# Patient Record
Sex: FEMALE | Race: WHITE | Employment: OTHER
[De-identification: names, ages, dates, MRNs, and addresses within clinical notes are randomized per-mention and may not be internally consistent; named-entity substitution may affect disease eponyms.]

---

## 2017-01-05 ENCOUNTER — CARE COORDINATION (OUTPATIENT)
Dept: CARE COORDINATION | Facility: CLINIC | Age: 72
End: 2017-01-05

## 2017-01-15 PROBLEM — J18.9 HCAP (HEALTHCARE-ASSOCIATED PNEUMONIA): Status: ACTIVE | Noted: 2017-01-15

## 2017-01-20 ENCOUNTER — TELEPHONE (OUTPATIENT)
Dept: GASTROENTEROLOGY | Facility: CLINIC | Age: 72
End: 2017-01-20

## 2017-01-25 RX ORDER — ATORVASTATIN CALCIUM 20 MG/1
TABLET, FILM COATED ORAL
Qty: 90 TABLET | Refills: 2 | Status: SHIPPED | OUTPATIENT
Start: 2017-01-25 | End: 2017-10-22 | Stop reason: SDUPTHER

## 2017-02-08 ENCOUNTER — TELEPHONE (OUTPATIENT)
Dept: GASTROENTEROLOGY | Facility: CLINIC | Age: 72
End: 2017-02-08

## 2017-02-09 ENCOUNTER — TELEPHONE (OUTPATIENT)
Dept: FAMILY MEDICINE CLINIC | Facility: CLINIC | Age: 72
End: 2017-02-09

## 2017-02-09 DIAGNOSIS — J18.9 PNEUMONIA DUE TO INFECTIOUS ORGANISM, UNSPECIFIED LATERALITY, UNSPECIFIED PART OF LUNG: Primary | ICD-10-CM

## 2017-02-10 ENCOUNTER — TELEPHONE (OUTPATIENT)
Dept: FAMILY MEDICINE CLINIC | Facility: CLINIC | Age: 72
End: 2017-02-10

## 2017-02-10 DIAGNOSIS — J18.9 PNEUMONIA DUE TO INFECTIOUS ORGANISM, UNSPECIFIED LATERALITY, UNSPECIFIED PART OF LUNG: Primary | ICD-10-CM

## 2017-02-13 ENCOUNTER — OFFICE VISIT (OUTPATIENT)
Dept: FAMILY MEDICINE CLINIC | Facility: CLINIC | Age: 72
End: 2017-02-13

## 2017-02-13 VITALS
OXYGEN SATURATION: 95 % | SYSTOLIC BLOOD PRESSURE: 109 MMHG | BODY MASS INDEX: 30.73 KG/M2 | HEART RATE: 80 BPM | DIASTOLIC BLOOD PRESSURE: 34 MMHG | WEIGHT: 179 LBS

## 2017-02-13 DIAGNOSIS — Z09 HOSPITAL DISCHARGE FOLLOW-UP: Primary | ICD-10-CM

## 2017-02-13 DIAGNOSIS — I50.41 ACUTE COMBINED SYSTOLIC AND DIASTOLIC CONGESTIVE HEART FAILURE (HCC): ICD-10-CM

## 2017-02-13 DIAGNOSIS — N17.9 RENAL FAILURE (ARF), ACUTE ON CHRONIC (HCC): ICD-10-CM

## 2017-02-13 DIAGNOSIS — I95.2 HYPOTENSION DUE TO DRUGS: ICD-10-CM

## 2017-02-13 DIAGNOSIS — N18.9 RENAL FAILURE (ARF), ACUTE ON CHRONIC (HCC): ICD-10-CM

## 2017-02-13 PROCEDURE — 1123F ACP DISCUSS/DSCN MKR DOCD: CPT | Performed by: FAMILY MEDICINE

## 2017-02-13 PROCEDURE — G8417 CALC BMI ABV UP PARAM F/U: HCPCS | Performed by: FAMILY MEDICINE

## 2017-02-13 PROCEDURE — 1111F DSCHRG MED/CURRENT MED MERGE: CPT | Performed by: FAMILY MEDICINE

## 2017-02-13 PROCEDURE — 3014F SCREEN MAMMO DOC REV: CPT | Performed by: FAMILY MEDICINE

## 2017-02-13 PROCEDURE — 99214 OFFICE O/P EST MOD 30 MIN: CPT | Performed by: FAMILY MEDICINE

## 2017-02-13 PROCEDURE — 1090F PRES/ABSN URINE INCON ASSESS: CPT | Performed by: FAMILY MEDICINE

## 2017-02-13 PROCEDURE — 4040F PNEUMOC VAC/ADMIN/RCVD: CPT | Performed by: FAMILY MEDICINE

## 2017-02-13 PROCEDURE — 3017F COLORECTAL CA SCREEN DOC REV: CPT | Performed by: FAMILY MEDICINE

## 2017-02-13 PROCEDURE — 1036F TOBACCO NON-USER: CPT | Performed by: FAMILY MEDICINE

## 2017-02-13 PROCEDURE — G8427 DOCREV CUR MEDS BY ELIG CLIN: HCPCS | Performed by: FAMILY MEDICINE

## 2017-02-13 PROCEDURE — G8484 FLU IMMUNIZE NO ADMIN: HCPCS | Performed by: FAMILY MEDICINE

## 2017-02-13 PROCEDURE — G8400 PT W/DXA NO RESULTS DOC: HCPCS | Performed by: FAMILY MEDICINE

## 2017-02-13 RX ORDER — HYDROCODONE BITARTRATE AND ACETAMINOPHEN 5; 325 MG/1; MG/1
1 TABLET ORAL EVERY 12 HOURS PRN
COMMUNITY
End: 2017-07-18 | Stop reason: SDUPTHER

## 2017-02-13 RX ORDER — POTASSIUM CHLORIDE 20 MEQ/1
1 TABLET, EXTENDED RELEASE ORAL DAILY
Status: ON HOLD | COMMUNITY
Start: 2017-02-07 | End: 2017-12-16 | Stop reason: SDUPTHER

## 2017-02-13 RX ORDER — FUROSEMIDE 20 MG/1
10 TABLET ORAL DAILY
COMMUNITY
Start: 2017-02-07 | End: 2017-03-20 | Stop reason: SDUPTHER

## 2017-02-14 ENCOUNTER — TELEPHONE (OUTPATIENT)
Dept: FAMILY MEDICINE CLINIC | Facility: CLINIC | Age: 72
End: 2017-02-14

## 2017-02-20 ENCOUNTER — CARE COORDINATION (OUTPATIENT)
Dept: CARE COORDINATION | Facility: CLINIC | Age: 72
End: 2017-02-20

## 2017-02-22 DIAGNOSIS — E78.2 MIXED HYPERLIPIDEMIA: ICD-10-CM

## 2017-02-22 DIAGNOSIS — K21.9 GASTROESOPHAGEAL REFLUX DISEASE WITHOUT ESOPHAGITIS: ICD-10-CM

## 2017-02-22 DIAGNOSIS — E11.9 TYPE 2 DIABETES MELLITUS WITHOUT COMPLICATION, WITHOUT LONG-TERM CURRENT USE OF INSULIN (HCC): ICD-10-CM

## 2017-02-22 DIAGNOSIS — G20 PARKINSON DISEASE (HCC): ICD-10-CM

## 2017-02-22 DIAGNOSIS — I10 ESSENTIAL HYPERTENSION: ICD-10-CM

## 2017-02-22 RX ORDER — CELECOXIB 200 MG/1
CAPSULE ORAL
Qty: 90 CAPSULE | Refills: 0 | Status: ON HOLD | OUTPATIENT
Start: 2017-02-22 | End: 2017-04-25 | Stop reason: SDUPTHER

## 2017-03-02 ENCOUNTER — TELEPHONE (OUTPATIENT)
Dept: FAMILY MEDICINE CLINIC | Facility: CLINIC | Age: 72
End: 2017-03-02

## 2017-03-03 ENCOUNTER — TELEPHONE (OUTPATIENT)
Dept: FAMILY MEDICINE CLINIC | Facility: CLINIC | Age: 72
End: 2017-03-03

## 2017-03-08 ENCOUNTER — HOSPITAL ENCOUNTER (OUTPATIENT)
Age: 72
Setting detail: SPECIMEN
Discharge: HOME | End: 2017-03-08
Payer: COMMERCIAL

## 2017-03-08 LAB
ALBUMIN SERPL-MCNC: 4.1 G/DL (ref 3.5–5.2)
ANION GAP SERPL CALCULATED.3IONS-SCNC: 15 MMOL/L (ref 9–17)
ANION GAP SERPL CALCULATED.3IONS-SCNC: 15 MMOL/L (ref 9–17)
BUN BLDV-MCNC: 24 MG/DL (ref 8–23)
BUN BLDV-MCNC: 24 MG/DL (ref 8–23)
BUN/CREAT BLD: ABNORMAL (ref 9–20)
BUN/CREAT BLD: ABNORMAL (ref 9–20)
CALCIUM SERPL-MCNC: 8.5 MG/DL (ref 8.6–10.4)
CALCIUM SERPL-MCNC: 8.5 MG/DL (ref 8.6–10.4)
CHLORIDE BLD-SCNC: 97 MMOL/L (ref 98–107)
CHLORIDE BLD-SCNC: 97 MMOL/L (ref 98–107)
CO2: 32 MMOL/L (ref 20–31)
CO2: 32 MMOL/L (ref 20–31)
CREAT SERPL-MCNC: 1.44 MG/DL (ref 0.5–0.9)
CREAT SERPL-MCNC: 1.44 MG/DL (ref 0.5–0.9)
GFR AFRICAN AMERICAN: 44 ML/MIN
GFR AFRICAN AMERICAN: 44 ML/MIN
GFR NON-AFRICAN AMERICAN: 36 ML/MIN
GFR NON-AFRICAN AMERICAN: 36 ML/MIN
GFR SERPL CREATININE-BSD FRML MDRD: ABNORMAL ML/MIN/{1.73_M2}
GLUCOSE BLD-MCNC: 104 MG/DL (ref 70–99)
GLUCOSE BLD-MCNC: 104 MG/DL (ref 70–99)
MAGNESIUM: 1.4 MG/DL (ref 1.6–2.6)
PHOSPHORUS: 5 MG/DL (ref 2.6–4.5)
POTASSIUM SERPL-SCNC: 5.1 MMOL/L (ref 3.7–5.3)
POTASSIUM SERPL-SCNC: 5.1 MMOL/L (ref 3.7–5.3)
SODIUM BLD-SCNC: 144 MMOL/L (ref 135–144)
SODIUM BLD-SCNC: 144 MMOL/L (ref 135–144)

## 2017-03-10 ENCOUNTER — TELEPHONE (OUTPATIENT)
Dept: FAMILY MEDICINE CLINIC | Facility: CLINIC | Age: 72
End: 2017-03-10

## 2017-03-20 RX ORDER — FUROSEMIDE 20 MG/1
20 TABLET ORAL DAILY
Qty: 90 TABLET | Refills: 3 | Status: SHIPPED | OUTPATIENT
Start: 2017-03-20 | End: 2017-11-09 | Stop reason: SDUPTHER

## 2017-03-20 RX ORDER — PANTOPRAZOLE SODIUM 40 MG/1
40 TABLET, DELAYED RELEASE ORAL
Qty: 180 TABLET | Refills: 3 | Status: SHIPPED | OUTPATIENT
Start: 2017-03-20 | End: 2017-11-09 | Stop reason: SDUPTHER

## 2017-03-21 ENCOUNTER — OFFICE VISIT (OUTPATIENT)
Dept: GASTROENTEROLOGY | Age: 72
End: 2017-03-21
Payer: COMMERCIAL

## 2017-03-21 VITALS
TEMPERATURE: 97.3 F | RESPIRATION RATE: 14 BRPM | OXYGEN SATURATION: 95 % | SYSTOLIC BLOOD PRESSURE: 141 MMHG | HEART RATE: 76 BPM | WEIGHT: 176 LBS | BODY MASS INDEX: 37.97 KG/M2 | HEIGHT: 57 IN | DIASTOLIC BLOOD PRESSURE: 56 MMHG

## 2017-03-21 DIAGNOSIS — D50.0 IRON DEFICIENCY ANEMIA DUE TO CHRONIC BLOOD LOSS: Primary | ICD-10-CM

## 2017-03-21 DIAGNOSIS — K92.2 GASTROINTESTINAL HEMORRHAGE, UNSPECIFIED GASTROINTESTINAL HEMORRHAGE TYPE: ICD-10-CM

## 2017-03-21 DIAGNOSIS — K63.5 COLON POLYPS: ICD-10-CM

## 2017-03-21 DIAGNOSIS — K21.9 GASTROESOPHAGEAL REFLUX DISEASE WITHOUT ESOPHAGITIS: ICD-10-CM

## 2017-03-21 PROCEDURE — G8417 CALC BMI ABV UP PARAM F/U: HCPCS | Performed by: INTERNAL MEDICINE

## 2017-03-21 PROCEDURE — G8484 FLU IMMUNIZE NO ADMIN: HCPCS | Performed by: INTERNAL MEDICINE

## 2017-03-21 PROCEDURE — 3017F COLORECTAL CA SCREEN DOC REV: CPT | Performed by: INTERNAL MEDICINE

## 2017-03-21 PROCEDURE — 1123F ACP DISCUSS/DSCN MKR DOCD: CPT | Performed by: INTERNAL MEDICINE

## 2017-03-21 PROCEDURE — 99214 OFFICE O/P EST MOD 30 MIN: CPT | Performed by: INTERNAL MEDICINE

## 2017-03-21 PROCEDURE — 1036F TOBACCO NON-USER: CPT | Performed by: INTERNAL MEDICINE

## 2017-03-21 PROCEDURE — 1090F PRES/ABSN URINE INCON ASSESS: CPT | Performed by: INTERNAL MEDICINE

## 2017-03-21 PROCEDURE — 4040F PNEUMOC VAC/ADMIN/RCVD: CPT | Performed by: INTERNAL MEDICINE

## 2017-03-21 PROCEDURE — G8400 PT W/DXA NO RESULTS DOC: HCPCS | Performed by: INTERNAL MEDICINE

## 2017-03-21 PROCEDURE — G8428 CUR MEDS NOT DOCUMENT: HCPCS | Performed by: INTERNAL MEDICINE

## 2017-03-21 PROCEDURE — 3014F SCREEN MAMMO DOC REV: CPT | Performed by: INTERNAL MEDICINE

## 2017-03-21 PROCEDURE — 1111F DSCHRG MED/CURRENT MED MERGE: CPT | Performed by: INTERNAL MEDICINE

## 2017-03-21 RX ORDER — MAGNESIUM OXIDE 400 MG/1
400 TABLET ORAL DAILY
Status: ON HOLD | COMMUNITY
End: 2018-03-10 | Stop reason: HOSPADM

## 2017-03-21 ASSESSMENT — ENCOUNTER SYMPTOMS
WHEEZING: 1
COUGH: 1
ABDOMINAL DISTENTION: 0
DIARRHEA: 1
CONSTIPATION: 1
VOMITING: 0
BLOOD IN STOOL: 0
SHORTNESS OF BREATH: 1
VOICE CHANGE: 1
RECTAL PAIN: 0
ANAL BLEEDING: 0
ALLERGIC/IMMUNOLOGIC NEGATIVE: 1
TROUBLE SWALLOWING: 1
NAUSEA: 1
SINUS PRESSURE: 1
ABDOMINAL PAIN: 0

## 2017-04-17 ENCOUNTER — TELEPHONE (OUTPATIENT)
Dept: GASTROENTEROLOGY | Age: 72
End: 2017-04-17

## 2017-04-25 ENCOUNTER — HOSPITAL ENCOUNTER (OUTPATIENT)
Age: 72
Setting detail: OUTPATIENT SURGERY
Discharge: HOME | End: 2017-04-25
Attending: INTERNAL MEDICINE | Admitting: INTERNAL MEDICINE
Payer: COMMERCIAL

## 2017-04-25 VITALS
TEMPERATURE: 97.2 F | OXYGEN SATURATION: 100 % | BODY MASS INDEX: 38.1 KG/M2 | SYSTOLIC BLOOD PRESSURE: 153 MMHG | HEART RATE: 65 BPM | WEIGHT: 176.6 LBS | RESPIRATION RATE: 16 BRPM | DIASTOLIC BLOOD PRESSURE: 58 MMHG | HEIGHT: 57 IN

## 2017-04-25 PROCEDURE — 7100000011 HC PHASE II RECOVERY - ADDTL 15 MIN: Performed by: INTERNAL MEDICINE

## 2017-04-25 PROCEDURE — 99153 MOD SED SAME PHYS/QHP EA: CPT | Performed by: INTERNAL MEDICINE

## 2017-04-25 PROCEDURE — 88305 TISSUE EXAM BY PATHOLOGIST: CPT

## 2017-04-25 PROCEDURE — 6360000002 HC RX W HCPCS: Performed by: INTERNAL MEDICINE

## 2017-04-25 PROCEDURE — 3609010400 HC COLONOSCOPY POLYPECTOMY HOT BIOPSY: Performed by: INTERNAL MEDICINE

## 2017-04-25 PROCEDURE — 2580000003 HC RX 258: Performed by: INTERNAL MEDICINE

## 2017-04-25 PROCEDURE — 7100000010 HC PHASE II RECOVERY - FIRST 15 MIN: Performed by: INTERNAL MEDICINE

## 2017-04-25 PROCEDURE — 99152 MOD SED SAME PHYS/QHP 5/>YRS: CPT | Performed by: INTERNAL MEDICINE

## 2017-04-25 RX ORDER — MEPERIDINE HYDROCHLORIDE 50 MG/ML
INJECTION INTRAMUSCULAR; INTRAVENOUS; SUBCUTANEOUS PRN
Status: DISCONTINUED | OUTPATIENT
Start: 2017-04-25 | End: 2017-04-25 | Stop reason: HOSPADM

## 2017-04-25 RX ORDER — MIDAZOLAM HYDROCHLORIDE 1 MG/ML
INJECTION INTRAMUSCULAR; INTRAVENOUS PRN
Status: DISCONTINUED | OUTPATIENT
Start: 2017-04-25 | End: 2017-04-25 | Stop reason: HOSPADM

## 2017-04-25 RX ORDER — SODIUM CHLORIDE, SODIUM LACTATE, POTASSIUM CHLORIDE, CALCIUM CHLORIDE 600; 310; 30; 20 MG/100ML; MG/100ML; MG/100ML; MG/100ML
INJECTION, SOLUTION INTRAVENOUS ONCE
Status: COMPLETED | OUTPATIENT
Start: 2017-04-25 | End: 2017-04-25

## 2017-04-25 RX ADMIN — SODIUM CHLORIDE, POTASSIUM CHLORIDE, SODIUM LACTATE AND CALCIUM CHLORIDE: 600; 310; 30; 20 INJECTION, SOLUTION INTRAVENOUS at 08:12

## 2017-04-25 ASSESSMENT — PAIN SCALES - GENERAL
PAINLEVEL_OUTOF10: 0

## 2017-04-25 ASSESSMENT — PAIN - FUNCTIONAL ASSESSMENT: PAIN_FUNCTIONAL_ASSESSMENT: 0-10

## 2017-04-26 LAB — SURGICAL PATHOLOGY REPORT: NORMAL

## 2017-06-23 ENCOUNTER — HOSPITAL ENCOUNTER (OUTPATIENT)
Age: 72
Discharge: HOME | End: 2017-06-23
Payer: COMMERCIAL

## 2017-06-23 ENCOUNTER — HOSPITAL ENCOUNTER (OUTPATIENT)
Dept: CT IMAGING | Age: 72
Discharge: HOME | End: 2017-06-23
Payer: COMMERCIAL

## 2017-06-23 DIAGNOSIS — R91.1 PULMONARY NODULE: ICD-10-CM

## 2017-06-23 LAB
ANION GAP SERPL CALCULATED.3IONS-SCNC: 12 MMOL/L (ref 9–17)
BUN BLDV-MCNC: 23 MG/DL (ref 8–23)
BUN/CREAT BLD: 19 (ref 9–20)
CALCIUM SERPL-MCNC: 9.3 MG/DL (ref 8.6–10.4)
CHLORIDE BLD-SCNC: 97 MMOL/L (ref 98–107)
CO2: 35 MMOL/L (ref 20–31)
CREAT SERPL-MCNC: 1.2 MG/DL (ref 0.5–0.9)
GFR AFRICAN AMERICAN: 54 ML/MIN
GFR NON-AFRICAN AMERICAN: 45 ML/MIN
GFR SERPL CREATININE-BSD FRML MDRD: ABNORMAL ML/MIN/{1.73_M2}
GFR SERPL CREATININE-BSD FRML MDRD: ABNORMAL ML/MIN/{1.73_M2}
GLUCOSE FASTING: 91 MG/DL (ref 70–99)
MAGNESIUM: 1.5 MG/DL (ref 1.6–2.6)
POTASSIUM SERPL-SCNC: 4.7 MMOL/L (ref 3.7–5.3)
SODIUM BLD-SCNC: 144 MMOL/L (ref 135–144)

## 2017-06-23 PROCEDURE — 80048 BASIC METABOLIC PNL TOTAL CA: CPT

## 2017-06-23 PROCEDURE — 71250 CT THORAX DX C-: CPT

## 2017-06-23 PROCEDURE — 83735 ASSAY OF MAGNESIUM: CPT

## 2017-06-23 PROCEDURE — 36415 COLL VENOUS BLD VENIPUNCTURE: CPT

## 2017-06-25 ENCOUNTER — PATIENT MESSAGE (OUTPATIENT)
Dept: FAMILY MEDICINE CLINIC | Age: 72
End: 2017-06-25

## 2017-07-18 ENCOUNTER — OFFICE VISIT (OUTPATIENT)
Dept: FAMILY MEDICINE CLINIC | Age: 72
End: 2017-07-18
Payer: COMMERCIAL

## 2017-07-18 VITALS
WEIGHT: 183 LBS | RESPIRATION RATE: 16 BRPM | OXYGEN SATURATION: 95 % | SYSTOLIC BLOOD PRESSURE: 138 MMHG | BODY MASS INDEX: 39.48 KG/M2 | HEART RATE: 88 BPM | DIASTOLIC BLOOD PRESSURE: 64 MMHG | HEIGHT: 57 IN

## 2017-07-18 DIAGNOSIS — H91.93 HEARING LOSS, BILATERAL: ICD-10-CM

## 2017-07-18 DIAGNOSIS — K21.9 GASTROESOPHAGEAL REFLUX DISEASE WITHOUT ESOPHAGITIS: ICD-10-CM

## 2017-07-18 DIAGNOSIS — G20 PARKINSON DISEASE (HCC): ICD-10-CM

## 2017-07-18 DIAGNOSIS — E11.9 TYPE 2 DIABETES MELLITUS WITHOUT COMPLICATION, WITHOUT LONG-TERM CURRENT USE OF INSULIN (HCC): ICD-10-CM

## 2017-07-18 DIAGNOSIS — E83.42 HYPOMAGNESEMIA: ICD-10-CM

## 2017-07-18 DIAGNOSIS — E83.51 HYPOCALCEMIA: ICD-10-CM

## 2017-07-18 DIAGNOSIS — E83.39 HYPERPHOSPHATURIA: ICD-10-CM

## 2017-07-18 DIAGNOSIS — I10 ESSENTIAL HYPERTENSION: Primary | ICD-10-CM

## 2017-07-18 DIAGNOSIS — G89.4 CHRONIC PAIN SYNDROME: ICD-10-CM

## 2017-07-18 DIAGNOSIS — I48.20 CHRONIC ATRIAL FIBRILLATION (HCC): ICD-10-CM

## 2017-07-18 DIAGNOSIS — E78.2 MIXED HYPERLIPIDEMIA: ICD-10-CM

## 2017-07-18 DIAGNOSIS — Z12.31 SCREENING MAMMOGRAM, ENCOUNTER FOR: ICD-10-CM

## 2017-07-18 DIAGNOSIS — D50.0 IRON DEFICIENCY ANEMIA DUE TO CHRONIC BLOOD LOSS: ICD-10-CM

## 2017-07-18 DIAGNOSIS — Z23 NEED FOR SHINGLES VACCINE: ICD-10-CM

## 2017-07-18 DIAGNOSIS — J44.1 COPD WITH EXACERBATION (HCC): ICD-10-CM

## 2017-07-18 LAB — HBA1C MFR BLD: 6.2 %

## 2017-07-18 PROCEDURE — 83036 HEMOGLOBIN GLYCOSYLATED A1C: CPT | Performed by: FAMILY MEDICINE

## 2017-07-18 PROCEDURE — G8417 CALC BMI ABV UP PARAM F/U: HCPCS | Performed by: FAMILY MEDICINE

## 2017-07-18 PROCEDURE — 3046F HEMOGLOBIN A1C LEVEL >9.0%: CPT | Performed by: FAMILY MEDICINE

## 2017-07-18 PROCEDURE — 3014F SCREEN MAMMO DOC REV: CPT | Performed by: FAMILY MEDICINE

## 2017-07-18 PROCEDURE — 1123F ACP DISCUSS/DSCN MKR DOCD: CPT | Performed by: FAMILY MEDICINE

## 2017-07-18 PROCEDURE — 90736 HZV VACCINE LIVE SUBQ: CPT | Performed by: FAMILY MEDICINE

## 2017-07-18 PROCEDURE — G8926 SPIRO NO PERF OR DOC: HCPCS | Performed by: FAMILY MEDICINE

## 2017-07-18 PROCEDURE — G8400 PT W/DXA NO RESULTS DOC: HCPCS | Performed by: FAMILY MEDICINE

## 2017-07-18 PROCEDURE — G8427 DOCREV CUR MEDS BY ELIG CLIN: HCPCS | Performed by: FAMILY MEDICINE

## 2017-07-18 PROCEDURE — 4040F PNEUMOC VAC/ADMIN/RCVD: CPT | Performed by: FAMILY MEDICINE

## 2017-07-18 PROCEDURE — 1090F PRES/ABSN URINE INCON ASSESS: CPT | Performed by: FAMILY MEDICINE

## 2017-07-18 PROCEDURE — 99214 OFFICE O/P EST MOD 30 MIN: CPT | Performed by: FAMILY MEDICINE

## 2017-07-18 PROCEDURE — 3017F COLORECTAL CA SCREEN DOC REV: CPT | Performed by: FAMILY MEDICINE

## 2017-07-18 PROCEDURE — 1036F TOBACCO NON-USER: CPT | Performed by: FAMILY MEDICINE

## 2017-07-18 PROCEDURE — 3023F SPIROM DOC REV: CPT | Performed by: FAMILY MEDICINE

## 2017-07-18 PROCEDURE — 90471 IMMUNIZATION ADMIN: CPT | Performed by: FAMILY MEDICINE

## 2017-07-18 RX ORDER — HYDROCODONE BITARTRATE AND ACETAMINOPHEN 5; 325 MG/1; MG/1
1 TABLET ORAL 2 TIMES DAILY PRN
Qty: 60 TABLET | Refills: 0 | Status: SHIPPED | OUTPATIENT
Start: 2017-07-18 | End: 2017-08-16 | Stop reason: SDUPTHER

## 2017-07-18 RX ORDER — ALBUTEROL SULFATE 2.5 MG/3ML
2.5 SOLUTION RESPIRATORY (INHALATION) EVERY 6 HOURS PRN
Qty: 360 EACH | Refills: 3 | Status: ON HOLD | OUTPATIENT
Start: 2017-07-18 | End: 2018-09-07 | Stop reason: HOSPADM

## 2017-07-18 ASSESSMENT — PATIENT HEALTH QUESTIONNAIRE - PHQ9
2. FEELING DOWN, DEPRESSED OR HOPELESS: 0
1. LITTLE INTEREST OR PLEASURE IN DOING THINGS: 0
SUM OF ALL RESPONSES TO PHQ9 QUESTIONS 1 & 2: 0
SUM OF ALL RESPONSES TO PHQ QUESTIONS 1-9: 0

## 2017-08-16 DIAGNOSIS — G89.4 CHRONIC PAIN SYNDROME: ICD-10-CM

## 2017-08-17 ENCOUNTER — HOSPITAL ENCOUNTER (INPATIENT)
Age: 72
LOS: 3 days | Discharge: HOME | DRG: 308 | End: 2017-08-20
Attending: EMERGENCY MEDICINE | Admitting: INTERNAL MEDICINE
Payer: COMMERCIAL

## 2017-08-17 ENCOUNTER — APPOINTMENT (OUTPATIENT)
Dept: GENERAL RADIOLOGY | Age: 72
DRG: 308 | End: 2017-08-17
Payer: COMMERCIAL

## 2017-08-17 DIAGNOSIS — I48.91 ATRIAL FIBRILLATION WITH RVR (HCC): Primary | ICD-10-CM

## 2017-08-17 LAB
ABSOLUTE EOS #: 0.2 K/UL (ref 0–0.4)
ABSOLUTE LYMPH #: 1.4 K/UL (ref 1–4.8)
ABSOLUTE MONO #: 0.6 K/UL (ref 0.2–0.8)
ANION GAP SERPL CALCULATED.3IONS-SCNC: 13 MMOL/L (ref 9–17)
BASOPHILS # BLD: 0 %
BASOPHILS ABSOLUTE: 0 K/UL (ref 0–0.2)
BNP INTERPRETATION: ABNORMAL
BUN BLDV-MCNC: 21 MG/DL (ref 8–23)
BUN/CREAT BLD: 18 (ref 9–20)
CALCIUM SERPL-MCNC: 8.9 MG/DL (ref 8.6–10.4)
CHLORIDE BLD-SCNC: 97 MMOL/L (ref 98–107)
CO2: 33 MMOL/L (ref 20–31)
CREAT SERPL-MCNC: 1.16 MG/DL (ref 0.5–0.9)
DIFFERENTIAL TYPE: ABNORMAL
EOSINOPHILS RELATIVE PERCENT: 2 %
GFR AFRICAN AMERICAN: 56 ML/MIN
GFR NON-AFRICAN AMERICAN: 46 ML/MIN
GFR SERPL CREATININE-BSD FRML MDRD: ABNORMAL ML/MIN/{1.73_M2}
GFR SERPL CREATININE-BSD FRML MDRD: ABNORMAL ML/MIN/{1.73_M2}
GLUCOSE BLD-MCNC: 185 MG/DL (ref 70–99)
HCT VFR BLD CALC: 29.2 % (ref 36–46)
HEMOGLOBIN: 9.5 G/DL (ref 12–16)
LYMPHOCYTES # BLD: 13 %
MAGNESIUM: 1.5 MG/DL (ref 1.6–2.6)
MCH RBC QN AUTO: 30.3 PG (ref 26–34)
MCHC RBC AUTO-ENTMCNC: 32.7 G/DL (ref 31–37)
MCV RBC AUTO: 92.6 FL (ref 80–100)
MONOCYTES # BLD: 6 %
MYOGLOBIN: 230 NG/ML (ref 25–58)
MYOGLOBIN: 59 NG/ML (ref 25–58)
MYOGLOBIN: 96 NG/ML (ref 25–58)
PDW BLD-RTO: 15.8 % (ref 11.5–14.5)
PLATELET # BLD: 128 K/UL (ref 130–400)
PLATELET ESTIMATE: ABNORMAL
PMV BLD AUTO: 8.4 FL (ref 6–12)
POTASSIUM SERPL-SCNC: 4.2 MMOL/L (ref 3.7–5.3)
PRO-BNP: 498 PG/ML
RBC # BLD: 3.15 M/UL (ref 4–5.2)
RBC # BLD: ABNORMAL 10*6/UL
SEG NEUTROPHILS: 79 %
SEGMENTED NEUTROPHILS ABSOLUTE COUNT: 8 K/UL (ref 1.8–7.7)
SODIUM BLD-SCNC: 143 MMOL/L (ref 135–144)
THYROXINE, FREE: 1.41 NG/DL (ref 0.93–1.7)
TROPONIN INTERP: ABNORMAL
TROPONIN T: <0.03 NG/ML
TSH SERPL DL<=0.05 MIU/L-ACNC: 5.28 MIU/L (ref 0.3–5)
WBC # BLD: 10.2 K/UL (ref 3.5–11)
WBC # BLD: ABNORMAL 10*3/UL

## 2017-08-17 PROCEDURE — 94640 AIRWAY INHALATION TREATMENT: CPT

## 2017-08-17 PROCEDURE — 36415 COLL VENOUS BLD VENIPUNCTURE: CPT

## 2017-08-17 PROCEDURE — 2500000003 HC RX 250 WO HCPCS: Performed by: EMERGENCY MEDICINE

## 2017-08-17 PROCEDURE — 2060000000 HC ICU INTERMEDIATE R&B

## 2017-08-17 PROCEDURE — 96366 THER/PROPH/DIAG IV INF ADDON: CPT

## 2017-08-17 PROCEDURE — 93005 ELECTROCARDIOGRAM TRACING: CPT

## 2017-08-17 PROCEDURE — 2580000003 HC RX 258: Performed by: EMERGENCY MEDICINE

## 2017-08-17 PROCEDURE — 2500000003 HC RX 250 WO HCPCS: Performed by: NURSE PRACTITIONER

## 2017-08-17 PROCEDURE — 80048 BASIC METABOLIC PNL TOTAL CA: CPT

## 2017-08-17 PROCEDURE — 83735 ASSAY OF MAGNESIUM: CPT

## 2017-08-17 PROCEDURE — 6370000000 HC RX 637 (ALT 250 FOR IP): Performed by: INTERNAL MEDICINE

## 2017-08-17 PROCEDURE — 96368 THER/DIAG CONCURRENT INF: CPT

## 2017-08-17 PROCEDURE — 84439 ASSAY OF FREE THYROXINE: CPT

## 2017-08-17 PROCEDURE — 94760 N-INVAS EAR/PLS OXIMETRY 1: CPT

## 2017-08-17 PROCEDURE — 96375 TX/PRO/DX INJ NEW DRUG ADDON: CPT

## 2017-08-17 PROCEDURE — 71010 XR CHEST PORTABLE: CPT

## 2017-08-17 PROCEDURE — 84484 ASSAY OF TROPONIN QUANT: CPT

## 2017-08-17 PROCEDURE — 96376 TX/PRO/DX INJ SAME DRUG ADON: CPT

## 2017-08-17 PROCEDURE — 96365 THER/PROPH/DIAG IV INF INIT: CPT

## 2017-08-17 PROCEDURE — 6360000002 HC RX W HCPCS: Performed by: NURSE PRACTITIONER

## 2017-08-17 PROCEDURE — 99285 EMERGENCY DEPT VISIT HI MDM: CPT

## 2017-08-17 PROCEDURE — 84443 ASSAY THYROID STIM HORMONE: CPT

## 2017-08-17 PROCEDURE — 6370000000 HC RX 637 (ALT 250 FOR IP): Performed by: NURSE PRACTITIONER

## 2017-08-17 PROCEDURE — 85025 COMPLETE CBC W/AUTO DIFF WBC: CPT

## 2017-08-17 PROCEDURE — 83874 ASSAY OF MYOGLOBIN: CPT

## 2017-08-17 PROCEDURE — 83880 ASSAY OF NATRIURETIC PEPTIDE: CPT

## 2017-08-17 PROCEDURE — 2700000000 HC OXYGEN THERAPY PER DAY

## 2017-08-17 RX ORDER — CALCIUM CARBONATE 200(500)MG
1500 TABLET,CHEWABLE ORAL DAILY
Status: DISCONTINUED | OUTPATIENT
Start: 2017-08-17 | End: 2017-08-20 | Stop reason: HOSPADM

## 2017-08-17 RX ORDER — FUROSEMIDE 20 MG/1
20 TABLET ORAL DAILY
Status: DISCONTINUED | OUTPATIENT
Start: 2017-08-17 | End: 2017-08-20 | Stop reason: HOSPADM

## 2017-08-17 RX ORDER — SPIRONOLACTONE 25 MG/1
25 TABLET ORAL DAILY
Status: DISCONTINUED | OUTPATIENT
Start: 2017-08-17 | End: 2017-08-20 | Stop reason: HOSPADM

## 2017-08-17 RX ORDER — DILTIAZEM HYDROCHLORIDE 120 MG/1
120 CAPSULE, COATED, EXTENDED RELEASE ORAL DAILY
Status: DISCONTINUED | OUTPATIENT
Start: 2017-08-17 | End: 2017-08-19

## 2017-08-17 RX ORDER — ACETAMINOPHEN 325 MG/1
650 TABLET ORAL EVERY 4 HOURS PRN
Status: DISCONTINUED | OUTPATIENT
Start: 2017-08-17 | End: 2017-08-20 | Stop reason: HOSPADM

## 2017-08-17 RX ORDER — RASAGILINE 0.5 MG/1
0.5 TABLET ORAL DAILY
Status: DISCONTINUED | OUTPATIENT
Start: 2017-08-17 | End: 2017-08-20 | Stop reason: HOSPADM

## 2017-08-17 RX ORDER — ALBUTEROL SULFATE 2.5 MG/3ML
2.5 SOLUTION RESPIRATORY (INHALATION) EVERY 6 HOURS PRN
Status: DISCONTINUED | OUTPATIENT
Start: 2017-08-17 | End: 2017-08-20 | Stop reason: HOSPADM

## 2017-08-17 RX ORDER — MORPHINE SULFATE 4 MG/ML
4 INJECTION, SOLUTION INTRAMUSCULAR; INTRAVENOUS ONCE
Status: COMPLETED | OUTPATIENT
Start: 2017-08-17 | End: 2017-08-17

## 2017-08-17 RX ORDER — PANTOPRAZOLE SODIUM 40 MG/1
40 TABLET, DELAYED RELEASE ORAL
Status: DISCONTINUED | OUTPATIENT
Start: 2017-08-17 | End: 2017-08-20 | Stop reason: HOSPADM

## 2017-08-17 RX ORDER — ATORVASTATIN CALCIUM 20 MG/1
20 TABLET, FILM COATED ORAL NIGHTLY
Status: DISCONTINUED | OUTPATIENT
Start: 2017-08-17 | End: 2017-08-20 | Stop reason: HOSPADM

## 2017-08-17 RX ORDER — POTASSIUM CHLORIDE 20 MEQ/1
20 TABLET, EXTENDED RELEASE ORAL DAILY
Status: DISCONTINUED | OUTPATIENT
Start: 2017-08-17 | End: 2017-08-20 | Stop reason: HOSPADM

## 2017-08-17 RX ORDER — CELECOXIB 200 MG/1
200 CAPSULE ORAL DAILY
Status: DISCONTINUED | OUTPATIENT
Start: 2017-08-17 | End: 2017-08-18

## 2017-08-17 RX ORDER — DILTIAZEM HYDROCHLORIDE 5 MG/ML
10 INJECTION INTRAVENOUS ONCE
Status: COMPLETED | OUTPATIENT
Start: 2017-08-17 | End: 2017-08-17

## 2017-08-17 RX ORDER — SODIUM CHLORIDE 9 MG/ML
INJECTION, SOLUTION INTRAVENOUS CONTINUOUS
Status: DISCONTINUED | OUTPATIENT
Start: 2017-08-17 | End: 2017-08-17

## 2017-08-17 RX ORDER — SODIUM CHLORIDE 0.9 % (FLUSH) 0.9 %
10 SYRINGE (ML) INJECTION EVERY 12 HOURS SCHEDULED
Status: DISCONTINUED | OUTPATIENT
Start: 2017-08-17 | End: 2017-08-20 | Stop reason: HOSPADM

## 2017-08-17 RX ORDER — SODIUM CHLORIDE 0.9 % (FLUSH) 0.9 %
10 SYRINGE (ML) INJECTION PRN
Status: DISCONTINUED | OUTPATIENT
Start: 2017-08-17 | End: 2017-08-20 | Stop reason: HOSPADM

## 2017-08-17 RX ORDER — HYDROCODONE BITARTRATE AND ACETAMINOPHEN 5; 325 MG/1; MG/1
1 TABLET ORAL 2 TIMES DAILY PRN
Status: DISCONTINUED | OUTPATIENT
Start: 2017-08-17 | End: 2017-08-20 | Stop reason: HOSPADM

## 2017-08-17 RX ORDER — ONDANSETRON 2 MG/ML
4 INJECTION INTRAMUSCULAR; INTRAVENOUS EVERY 6 HOURS PRN
Status: DISCONTINUED | OUTPATIENT
Start: 2017-08-17 | End: 2017-08-20 | Stop reason: HOSPADM

## 2017-08-17 RX ORDER — ASPIRIN 81 MG/1
324 TABLET, CHEWABLE ORAL ONCE
Status: COMPLETED | OUTPATIENT
Start: 2017-08-17 | End: 2017-08-17

## 2017-08-17 RX ORDER — MAGNESIUM SULFATE 1 G/100ML
1 INJECTION INTRAVENOUS ONCE
Status: COMPLETED | OUTPATIENT
Start: 2017-08-17 | End: 2017-08-17

## 2017-08-17 RX ORDER — CALCITRIOL 0.25 UG/1
0.25 CAPSULE, LIQUID FILLED ORAL 3 TIMES DAILY
Status: DISCONTINUED | OUTPATIENT
Start: 2017-08-17 | End: 2017-08-20 | Stop reason: HOSPADM

## 2017-08-17 RX ORDER — HYDROCODONE BITARTRATE AND ACETAMINOPHEN 5; 325 MG/1; MG/1
1 TABLET ORAL 2 TIMES DAILY PRN
Qty: 60 TABLET | Refills: 0 | Status: SHIPPED | OUTPATIENT
Start: 2017-08-17 | End: 2017-10-02 | Stop reason: SDUPTHER

## 2017-08-17 RX ORDER — CLOPIDOGREL BISULFATE 75 MG/1
75 TABLET ORAL DAILY
Status: DISCONTINUED | OUTPATIENT
Start: 2017-08-17 | End: 2017-08-17

## 2017-08-17 RX ADMIN — MAGNESIUM SULFATE HEPTAHYDRATE 1 G: 1 INJECTION, SOLUTION INTRAVENOUS at 10:35

## 2017-08-17 RX ADMIN — PANTOPRAZOLE SODIUM 40 MG: 40 TABLET, DELAYED RELEASE ORAL at 16:56

## 2017-08-17 RX ADMIN — DILTIAZEM HYDROCHLORIDE 10 MG: 5 INJECTION INTRAVENOUS at 09:43

## 2017-08-17 RX ADMIN — ROPINIROLE HYDROCHLORIDE 3 MG: 2 TABLET, FILM COATED ORAL at 16:49

## 2017-08-17 RX ADMIN — ROPINIROLE HYDROCHLORIDE 3 MG: 2 TABLET, FILM COATED ORAL at 23:31

## 2017-08-17 RX ADMIN — ATORVASTATIN CALCIUM 20 MG: 20 TABLET, FILM COATED ORAL at 20:21

## 2017-08-17 RX ADMIN — DILTIAZEM HYDROCHLORIDE 10 MG/HR: 5 INJECTION INTRAVENOUS at 10:17

## 2017-08-17 RX ADMIN — HYDROCODONE BITARTRATE AND ACETAMINOPHEN 1 TABLET: 5; 325 TABLET ORAL at 23:32

## 2017-08-17 RX ADMIN — MORPHINE SULFATE 4 MG: 4 INJECTION, SOLUTION INTRAMUSCULAR; INTRAVENOUS at 10:55

## 2017-08-17 RX ADMIN — ASPIRIN 81 MG 324 MG: 81 TABLET ORAL at 09:41

## 2017-08-17 RX ADMIN — CARBIDOPA AND LEVODOPA 1 TABLET: 25; 100 TABLET ORAL at 20:21

## 2017-08-17 RX ADMIN — CARBIDOPA AND LEVODOPA 1 TABLET: 25; 100 TABLET ORAL at 16:49

## 2017-08-17 RX ADMIN — METOPROLOL TARTRATE 25 MG: 25 TABLET ORAL at 20:21

## 2017-08-17 RX ADMIN — TIOTROPIUM BROMIDE INHALATION SPRAY 2 PUFF: 3.12 SPRAY, METERED RESPIRATORY (INHALATION) at 20:10

## 2017-08-17 RX ADMIN — DILTIAZEM HYDROCHLORIDE 120 MG: 120 CAPSULE, COATED, EXTENDED RELEASE ORAL at 20:21

## 2017-08-17 RX ADMIN — CALCITRIOL 0.25 MCG: 0.25 CAPSULE, LIQUID FILLED ORAL at 20:21

## 2017-08-17 RX ADMIN — MORPHINE SULFATE 4 MG: 4 INJECTION, SOLUTION INTRAMUSCULAR; INTRAVENOUS at 09:39

## 2017-08-17 RX ADMIN — MOMETASONE FUROATE AND FORMOTEROL FUMARATE DIHYDRATE 2 PUFF: 200; 5 AEROSOL RESPIRATORY (INHALATION) at 20:09

## 2017-08-17 RX ADMIN — SODIUM CHLORIDE 1000 ML: 9 INJECTION, SOLUTION INTRAVENOUS at 10:04

## 2017-08-17 RX ADMIN — APIXABAN 5 MG: 5 TABLET, FILM COATED ORAL at 20:21

## 2017-08-17 RX ADMIN — HYDROCODONE BITARTRATE AND ACETAMINOPHEN 1 TABLET: 5; 325 TABLET ORAL at 16:48

## 2017-08-17 RX ADMIN — CALCITRIOL 0.25 MCG: 0.25 CAPSULE, LIQUID FILLED ORAL at 16:49

## 2017-08-17 ASSESSMENT — PAIN DESCRIPTION - PROGRESSION
CLINICAL_PROGRESSION: NOT CHANGED

## 2017-08-17 ASSESSMENT — ENCOUNTER SYMPTOMS
VOMITING: 0
SHORTNESS OF BREATH: 1
ABDOMINAL PAIN: 0
NAUSEA: 0

## 2017-08-17 ASSESSMENT — PAIN DESCRIPTION - DESCRIPTORS
DESCRIPTORS: ACHING;BURNING
DESCRIPTORS: SHARP
DESCRIPTORS: ACHING;SORE

## 2017-08-17 ASSESSMENT — PAIN DESCRIPTION - LOCATION
LOCATION: GENERALIZED
LOCATION: GENERALIZED
LOCATION: CHEST

## 2017-08-17 ASSESSMENT — PAIN SCALES - GENERAL
PAINLEVEL_OUTOF10: 5
PAINLEVEL_OUTOF10: 5
PAINLEVEL_OUTOF10: 7
PAINLEVEL_OUTOF10: 0
PAINLEVEL_OUTOF10: 7
PAINLEVEL_OUTOF10: 8
PAINLEVEL_OUTOF10: 1

## 2017-08-17 ASSESSMENT — PAIN DESCRIPTION - PAIN TYPE
TYPE: CHRONIC PAIN
TYPE: CHRONIC PAIN
TYPE: ACUTE PAIN

## 2017-08-17 ASSESSMENT — PAIN DESCRIPTION - FREQUENCY
FREQUENCY: CONTINUOUS

## 2017-08-17 ASSESSMENT — PAIN DESCRIPTION - ONSET
ONSET: ON-GOING

## 2017-08-17 ASSESSMENT — PAIN DESCRIPTION - ORIENTATION: ORIENTATION: LEFT

## 2017-08-18 ENCOUNTER — APPOINTMENT (OUTPATIENT)
Dept: GENERAL RADIOLOGY | Age: 72
DRG: 308 | End: 2017-08-18
Payer: COMMERCIAL

## 2017-08-18 PROBLEM — I48.91 RAPID ATRIAL FIBRILLATION (HCC): Status: ACTIVE | Noted: 2017-08-18

## 2017-08-18 PROBLEM — J43.1 PANLOBULAR EMPHYSEMA (HCC): Status: ACTIVE | Noted: 2017-08-18

## 2017-08-18 LAB
ABSOLUTE EOS #: 0.4 K/UL (ref 0–0.4)
ABSOLUTE LYMPH #: 1.2 K/UL (ref 1–4.8)
ABSOLUTE MONO #: 0.7 K/UL (ref 0.2–0.8)
ANION GAP SERPL CALCULATED.3IONS-SCNC: 12 MMOL/L (ref 9–17)
BASOPHILS # BLD: 0 %
BASOPHILS ABSOLUTE: 0 K/UL (ref 0–0.2)
BUN BLDV-MCNC: 23 MG/DL (ref 8–23)
BUN/CREAT BLD: 18 (ref 9–20)
CALCIUM SERPL-MCNC: 8.1 MG/DL (ref 8.6–10.4)
CHLORIDE BLD-SCNC: 98 MMOL/L (ref 98–107)
CHOLESTEROL/HDL RATIO: 4.1
CHOLESTEROL: 120 MG/DL
CO2: 34 MMOL/L (ref 20–31)
CREAT SERPL-MCNC: 1.31 MG/DL (ref 0.5–0.9)
DIFFERENTIAL TYPE: ABNORMAL
EKG ATRIAL RATE: 68 BPM
EKG ATRIAL RATE: 71 BPM
EKG Q-T INTERVAL: 336 MS
EKG Q-T INTERVAL: 416 MS
EKG QRS DURATION: 74 MS
EKG QRS DURATION: 80 MS
EKG QTC CALCULATION (BAZETT): 439 MS
EKG QTC CALCULATION (BAZETT): 484 MS
EKG R AXIS: -10 DEGREES
EKG R AXIS: -15 DEGREES
EKG T AXIS: 33 DEGREES
EKG T AXIS: 61 DEGREES
EKG VENTRICULAR RATE: 125 BPM
EKG VENTRICULAR RATE: 67 BPM
EOSINOPHILS RELATIVE PERCENT: 4 %
GFR AFRICAN AMERICAN: 49 ML/MIN
GFR NON-AFRICAN AMERICAN: 40 ML/MIN
GFR SERPL CREATININE-BSD FRML MDRD: ABNORMAL ML/MIN/{1.73_M2}
GFR SERPL CREATININE-BSD FRML MDRD: ABNORMAL ML/MIN/{1.73_M2}
GLUCOSE BLD-MCNC: 125 MG/DL (ref 70–99)
HCT VFR BLD CALC: 26.2 % (ref 36–46)
HDLC SERPL-MCNC: 29 MG/DL
HEMOGLOBIN: 8.4 G/DL (ref 12–16)
LDL CHOLESTEROL: 61 MG/DL (ref 0–130)
LV EF: 60 %
LVEF MODALITY: NORMAL
LYMPHOCYTES # BLD: 11 %
MAGNESIUM: 1.6 MG/DL (ref 1.6–2.6)
MCH RBC QN AUTO: 29.8 PG (ref 26–34)
MCHC RBC AUTO-ENTMCNC: 32 G/DL (ref 31–37)
MCV RBC AUTO: 93.1 FL (ref 80–100)
MONOCYTES # BLD: 6 %
PDW BLD-RTO: 15.6 % (ref 11.5–14.5)
PLATELET # BLD: 93 K/UL (ref 130–400)
PLATELET ESTIMATE: ABNORMAL
PMV BLD AUTO: 8.5 FL (ref 6–12)
POTASSIUM SERPL-SCNC: 4.3 MMOL/L (ref 3.7–5.3)
RBC # BLD: 2.82 M/UL (ref 4–5.2)
RBC # BLD: ABNORMAL 10*6/UL
SEG NEUTROPHILS: 79 %
SEGMENTED NEUTROPHILS ABSOLUTE COUNT: 8.6 K/UL (ref 1.8–7.7)
SODIUM BLD-SCNC: 144 MMOL/L (ref 135–144)
TRIGL SERPL-MCNC: 149 MG/DL
VLDLC SERPL CALC-MCNC: ABNORMAL MG/DL (ref 1–30)
WBC # BLD: 10.8 K/UL (ref 3.5–11)
WBC # BLD: ABNORMAL 10*3/UL

## 2017-08-18 PROCEDURE — 99223 1ST HOSP IP/OBS HIGH 75: CPT | Performed by: INTERNAL MEDICINE

## 2017-08-18 PROCEDURE — 6370000000 HC RX 637 (ALT 250 FOR IP): Performed by: INTERNAL MEDICINE

## 2017-08-18 PROCEDURE — 85025 COMPLETE CBC W/AUTO DIFF WBC: CPT

## 2017-08-18 PROCEDURE — 71010 XR CHEST PORTABLE: CPT

## 2017-08-18 PROCEDURE — 6360000002 HC RX W HCPCS: Performed by: INTERNAL MEDICINE

## 2017-08-18 PROCEDURE — 36415 COLL VENOUS BLD VENIPUNCTURE: CPT

## 2017-08-18 PROCEDURE — 94660 CPAP INITIATION&MGMT: CPT

## 2017-08-18 PROCEDURE — 2060000000 HC ICU INTERMEDIATE R&B

## 2017-08-18 PROCEDURE — 2580000003 HC RX 258: Performed by: INTERNAL MEDICINE

## 2017-08-18 PROCEDURE — 80061 LIPID PANEL: CPT

## 2017-08-18 PROCEDURE — 80048 BASIC METABOLIC PNL TOTAL CA: CPT

## 2017-08-18 PROCEDURE — 83735 ASSAY OF MAGNESIUM: CPT

## 2017-08-18 PROCEDURE — 93306 TTE W/DOPPLER COMPLETE: CPT

## 2017-08-18 PROCEDURE — 94640 AIRWAY INHALATION TREATMENT: CPT

## 2017-08-18 RX ORDER — DILTIAZEM HYDROCHLORIDE 120 MG/1
120 CAPSULE, COATED, EXTENDED RELEASE ORAL DAILY
Qty: 30 CAPSULE | Refills: 3 | Status: SHIPPED | OUTPATIENT
Start: 2017-08-18 | End: 2017-08-20 | Stop reason: HOSPADM

## 2017-08-18 RX ORDER — METHYLPREDNISOLONE SODIUM SUCCINATE 40 MG/ML
40 INJECTION, POWDER, LYOPHILIZED, FOR SOLUTION INTRAMUSCULAR; INTRAVENOUS EVERY 6 HOURS
Status: COMPLETED | OUTPATIENT
Start: 2017-08-18 | End: 2017-08-19

## 2017-08-18 RX ORDER — FUROSEMIDE 10 MG/ML
20 INJECTION INTRAMUSCULAR; INTRAVENOUS ONCE
Status: COMPLETED | OUTPATIENT
Start: 2017-08-18 | End: 2017-08-18

## 2017-08-18 RX ORDER — LANOLIN ALCOHOL/MO/W.PET/CERES
1000 CREAM (GRAM) TOPICAL DAILY
Status: DISCONTINUED | OUTPATIENT
Start: 2017-08-18 | End: 2017-08-20 | Stop reason: HOSPADM

## 2017-08-18 RX ADMIN — CARBIDOPA AND LEVODOPA 1 TABLET: 25; 100 TABLET ORAL at 14:37

## 2017-08-18 RX ADMIN — APIXABAN 5 MG: 5 TABLET, FILM COATED ORAL at 20:59

## 2017-08-18 RX ADMIN — Medication 10 ML: at 09:00

## 2017-08-18 RX ADMIN — METHYLPREDNISOLONE SODIUM SUCCINATE 40 MG: 40 INJECTION, POWDER, FOR SOLUTION INTRAMUSCULAR; INTRAVENOUS at 14:38

## 2017-08-18 RX ADMIN — CARBIDOPA AND LEVODOPA 1 TABLET: 25; 100 TABLET ORAL at 17:00

## 2017-08-18 RX ADMIN — CALCITRIOL 0.25 MCG: 0.25 CAPSULE, LIQUID FILLED ORAL at 20:59

## 2017-08-18 RX ADMIN — METOPROLOL TARTRATE 25 MG: 25 TABLET ORAL at 09:04

## 2017-08-18 RX ADMIN — FUROSEMIDE 20 MG: 10 INJECTION, SOLUTION INTRAMUSCULAR; INTRAVENOUS at 14:38

## 2017-08-18 RX ADMIN — ANTACID TABLETS 1500 MG: 500 TABLET, CHEWABLE ORAL at 09:04

## 2017-08-18 RX ADMIN — MOMETASONE FUROATE AND FORMOTEROL FUMARATE DIHYDRATE 2 PUFF: 200; 5 AEROSOL RESPIRATORY (INHALATION) at 19:44

## 2017-08-18 RX ADMIN — CALCITRIOL 0.25 MCG: 0.25 CAPSULE, LIQUID FILLED ORAL at 09:04

## 2017-08-18 RX ADMIN — POTASSIUM CHLORIDE 20 MEQ: 20 TABLET, EXTENDED RELEASE ORAL at 09:06

## 2017-08-18 RX ADMIN — ROPINIROLE HYDROCHLORIDE 3 MG: 2 TABLET, FILM COATED ORAL at 20:59

## 2017-08-18 RX ADMIN — PANTOPRAZOLE SODIUM 40 MG: 40 TABLET, DELAYED RELEASE ORAL at 06:25

## 2017-08-18 RX ADMIN — FUROSEMIDE 20 MG: 20 TABLET ORAL at 09:04

## 2017-08-18 RX ADMIN — CYANOCOBALAMIN TAB 1000 MCG 1000 MCG: 1000 TAB at 11:52

## 2017-08-18 RX ADMIN — METOPROLOL TARTRATE 25 MG: 25 TABLET ORAL at 20:59

## 2017-08-18 RX ADMIN — ATORVASTATIN CALCIUM 20 MG: 20 TABLET, FILM COATED ORAL at 20:59

## 2017-08-18 RX ADMIN — ROPINIROLE HYDROCHLORIDE 3 MG: 2 TABLET, FILM COATED ORAL at 11:52

## 2017-08-18 RX ADMIN — APIXABAN 5 MG: 5 TABLET, FILM COATED ORAL at 09:04

## 2017-08-18 RX ADMIN — METFORMIN HYDROCHLORIDE 1000 MG: 500 TABLET, FILM COATED ORAL at 09:06

## 2017-08-18 RX ADMIN — LINAGLIPTIN 5 MG: 5 TABLET, FILM COATED ORAL at 09:04

## 2017-08-18 RX ADMIN — DILTIAZEM HYDROCHLORIDE 120 MG: 120 CAPSULE, COATED, EXTENDED RELEASE ORAL at 09:06

## 2017-08-18 RX ADMIN — ROPINIROLE HYDROCHLORIDE 3 MG: 2 TABLET, FILM COATED ORAL at 14:38

## 2017-08-18 RX ADMIN — TIOTROPIUM BROMIDE INHALATION SPRAY 2 PUFF: 3.12 SPRAY, METERED RESPIRATORY (INHALATION) at 09:45

## 2017-08-18 RX ADMIN — CALCITRIOL 0.25 MCG: 0.25 CAPSULE, LIQUID FILLED ORAL at 14:38

## 2017-08-18 RX ADMIN — SPIRONOLACTONE 25 MG: 25 TABLET, FILM COATED ORAL at 09:06

## 2017-08-18 RX ADMIN — MOMETASONE FUROATE AND FORMOTEROL FUMARATE DIHYDRATE 2 PUFF: 200; 5 AEROSOL RESPIRATORY (INHALATION) at 09:45

## 2017-08-18 RX ADMIN — ACETAMINOPHEN 650 MG: 325 TABLET ORAL at 11:52

## 2017-08-18 RX ADMIN — CARBIDOPA AND LEVODOPA 1 TABLET: 25; 100 TABLET ORAL at 20:59

## 2017-08-18 RX ADMIN — RASAGILINE 0.5 MG: 0.5 TABLET ORAL at 09:06

## 2017-08-18 RX ADMIN — Medication 400 MG: at 09:06

## 2017-08-18 RX ADMIN — Medication 10 ML: at 20:59

## 2017-08-18 RX ADMIN — METHYLPREDNISOLONE SODIUM SUCCINATE 40 MG: 40 INJECTION, POWDER, FOR SOLUTION INTRAMUSCULAR; INTRAVENOUS at 18:30

## 2017-08-18 RX ADMIN — CARBIDOPA AND LEVODOPA 1 TABLET: 25; 100 TABLET ORAL at 09:04

## 2017-08-18 RX ADMIN — PANTOPRAZOLE SODIUM 40 MG: 40 TABLET, DELAYED RELEASE ORAL at 17:00

## 2017-08-18 RX ADMIN — METFORMIN HYDROCHLORIDE 1000 MG: 500 TABLET, FILM COATED ORAL at 17:00

## 2017-08-18 ASSESSMENT — PAIN SCALES - GENERAL
PAINLEVEL_OUTOF10: 0
PAINLEVEL_OUTOF10: 0
PAINLEVEL_OUTOF10: 10
PAINLEVEL_OUTOF10: 0
PAINLEVEL_OUTOF10: 0
PAINLEVEL_OUTOF10: 10
PAINLEVEL_OUTOF10: 0

## 2017-08-18 ASSESSMENT — PAIN DESCRIPTION - PAIN TYPE
TYPE: ACUTE PAIN
TYPE: ACUTE PAIN

## 2017-08-18 ASSESSMENT — PAIN DESCRIPTION - LOCATION
LOCATION: HEAD
LOCATION: HEAD

## 2017-08-19 ENCOUNTER — APPOINTMENT (OUTPATIENT)
Dept: GENERAL RADIOLOGY | Age: 72
DRG: 308 | End: 2017-08-19
Payer: COMMERCIAL

## 2017-08-19 LAB
ABSOLUTE EOS #: 0 K/UL (ref 0–0.4)
ABSOLUTE LYMPH #: 0.6 K/UL (ref 1–4.8)
ABSOLUTE MONO #: 0 K/UL (ref 0.2–0.8)
ANION GAP SERPL CALCULATED.3IONS-SCNC: 15 MMOL/L (ref 9–17)
BASOPHILS # BLD: 0 %
BASOPHILS ABSOLUTE: 0 K/UL (ref 0–0.2)
BUN BLDV-MCNC: 33 MG/DL (ref 8–23)
BUN/CREAT BLD: 22 (ref 9–20)
CALCIUM SERPL-MCNC: 8.7 MG/DL (ref 8.6–10.4)
CHLORIDE BLD-SCNC: 93 MMOL/L (ref 98–107)
CO2: 34 MMOL/L (ref 20–31)
CREAT SERPL-MCNC: 1.5 MG/DL (ref 0.5–0.9)
DIFFERENTIAL TYPE: ABNORMAL
EOSINOPHILS RELATIVE PERCENT: 0 %
GFR AFRICAN AMERICAN: 42 ML/MIN
GFR NON-AFRICAN AMERICAN: 34 ML/MIN
GFR SERPL CREATININE-BSD FRML MDRD: ABNORMAL ML/MIN/{1.73_M2}
GFR SERPL CREATININE-BSD FRML MDRD: ABNORMAL ML/MIN/{1.73_M2}
GLUCOSE BLD-MCNC: 181 MG/DL (ref 65–105)
GLUCOSE BLD-MCNC: 261 MG/DL (ref 70–99)
HCT VFR BLD CALC: 27.7 % (ref 36–46)
HEMOGLOBIN: 9.1 G/DL (ref 12–16)
LYMPHOCYTES # BLD: 5 %
MCH RBC QN AUTO: 29.8 PG (ref 26–34)
MCHC RBC AUTO-ENTMCNC: 32.8 G/DL (ref 31–37)
MCV RBC AUTO: 90.8 FL (ref 80–100)
MONOCYTES # BLD: 0 %
PDW BLD-RTO: 14.8 % (ref 11.5–14.5)
PLATELET # BLD: 111 K/UL (ref 130–400)
PLATELET ESTIMATE: ABNORMAL
PMV BLD AUTO: ABNORMAL FL (ref 6–12)
POTASSIUM SERPL-SCNC: 4.2 MMOL/L (ref 3.7–5.3)
RBC # BLD: 3.05 M/UL (ref 4–5.2)
RBC # BLD: ABNORMAL 10*6/UL
SEG NEUTROPHILS: 95 %
SEGMENTED NEUTROPHILS ABSOLUTE COUNT: 9.9 K/UL (ref 1.8–7.7)
SODIUM BLD-SCNC: 142 MMOL/L (ref 135–144)
WBC # BLD: 10.5 K/UL (ref 3.5–11)
WBC # BLD: ABNORMAL 10*3/UL

## 2017-08-19 PROCEDURE — 85025 COMPLETE CBC W/AUTO DIFF WBC: CPT

## 2017-08-19 PROCEDURE — 99232 SBSQ HOSP IP/OBS MODERATE 35: CPT | Performed by: INTERNAL MEDICINE

## 2017-08-19 PROCEDURE — 71010 XR CHEST PORTABLE: CPT

## 2017-08-19 PROCEDURE — 80048 BASIC METABOLIC PNL TOTAL CA: CPT

## 2017-08-19 PROCEDURE — 6370000000 HC RX 637 (ALT 250 FOR IP): Performed by: INTERNAL MEDICINE

## 2017-08-19 PROCEDURE — 6360000002 HC RX W HCPCS: Performed by: INTERNAL MEDICINE

## 2017-08-19 PROCEDURE — 2060000000 HC ICU INTERMEDIATE R&B

## 2017-08-19 PROCEDURE — 94660 CPAP INITIATION&MGMT: CPT

## 2017-08-19 PROCEDURE — 94640 AIRWAY INHALATION TREATMENT: CPT

## 2017-08-19 PROCEDURE — 82947 ASSAY GLUCOSE BLOOD QUANT: CPT

## 2017-08-19 PROCEDURE — 2500000003 HC RX 250 WO HCPCS: Performed by: INTERNAL MEDICINE

## 2017-08-19 PROCEDURE — 36415 COLL VENOUS BLD VENIPUNCTURE: CPT

## 2017-08-19 PROCEDURE — 2580000003 HC RX 258: Performed by: INTERNAL MEDICINE

## 2017-08-19 PROCEDURE — 94760 N-INVAS EAR/PLS OXIMETRY 1: CPT

## 2017-08-19 PROCEDURE — 94761 N-INVAS EAR/PLS OXIMETRY MLT: CPT

## 2017-08-19 RX ORDER — DILTIAZEM HYDROCHLORIDE 240 MG/1
240 CAPSULE, COATED, EXTENDED RELEASE ORAL DAILY
Status: DISCONTINUED | OUTPATIENT
Start: 2017-08-20 | End: 2017-08-20 | Stop reason: HOSPADM

## 2017-08-19 RX ORDER — PREDNISONE 20 MG/1
10 TABLET ORAL DAILY
Status: DISCONTINUED | OUTPATIENT
Start: 2017-08-19 | End: 2017-08-20 | Stop reason: HOSPADM

## 2017-08-19 RX ORDER — DILTIAZEM HYDROCHLORIDE 120 MG/1
120 CAPSULE, COATED, EXTENDED RELEASE ORAL ONCE
Status: COMPLETED | OUTPATIENT
Start: 2017-08-19 | End: 2017-08-19

## 2017-08-19 RX ADMIN — Medication 10 ML: at 21:04

## 2017-08-19 RX ADMIN — RASAGILINE 0.5 MG: 0.5 TABLET ORAL at 09:03

## 2017-08-19 RX ADMIN — METOPROLOL TARTRATE 25 MG: 25 TABLET ORAL at 09:10

## 2017-08-19 RX ADMIN — APIXABAN 5 MG: 5 TABLET, FILM COATED ORAL at 09:09

## 2017-08-19 RX ADMIN — DILTIAZEM HYDROCHLORIDE 120 MG: 120 CAPSULE, COATED, EXTENDED RELEASE ORAL at 09:04

## 2017-08-19 RX ADMIN — CALCITRIOL 0.25 MCG: 0.25 CAPSULE, LIQUID FILLED ORAL at 09:03

## 2017-08-19 RX ADMIN — SPIRONOLACTONE 25 MG: 25 TABLET, FILM COATED ORAL at 09:10

## 2017-08-19 RX ADMIN — METHYLPREDNISOLONE SODIUM SUCCINATE 40 MG: 40 INJECTION, POWDER, FOR SOLUTION INTRAMUSCULAR; INTRAVENOUS at 00:28

## 2017-08-19 RX ADMIN — PANTOPRAZOLE SODIUM 40 MG: 40 TABLET, DELAYED RELEASE ORAL at 17:34

## 2017-08-19 RX ADMIN — TIOTROPIUM BROMIDE INHALATION SPRAY 2 PUFF: 3.12 SPRAY, METERED RESPIRATORY (INHALATION) at 11:11

## 2017-08-19 RX ADMIN — CALCITRIOL 0.25 MCG: 0.25 CAPSULE, LIQUID FILLED ORAL at 15:21

## 2017-08-19 RX ADMIN — DILTIAZEM HYDROCHLORIDE 120 MG: 120 CAPSULE, COATED, EXTENDED RELEASE ORAL at 13:05

## 2017-08-19 RX ADMIN — CALCITRIOL 0.25 MCG: 0.25 CAPSULE, LIQUID FILLED ORAL at 21:03

## 2017-08-19 RX ADMIN — LINAGLIPTIN 5 MG: 5 TABLET, FILM COATED ORAL at 09:04

## 2017-08-19 RX ADMIN — HYDROCODONE BITARTRATE AND ACETAMINOPHEN 1 TABLET: 5; 325 TABLET ORAL at 08:12

## 2017-08-19 RX ADMIN — METFORMIN HYDROCHLORIDE 1000 MG: 500 TABLET, FILM COATED ORAL at 09:10

## 2017-08-19 RX ADMIN — CARBIDOPA AND LEVODOPA 1 TABLET: 25; 100 TABLET ORAL at 15:22

## 2017-08-19 RX ADMIN — FUROSEMIDE 20 MG: 20 TABLET ORAL at 09:10

## 2017-08-19 RX ADMIN — MOMETASONE FUROATE AND FORMOTEROL FUMARATE DIHYDRATE 2 PUFF: 200; 5 AEROSOL RESPIRATORY (INHALATION) at 11:11

## 2017-08-19 RX ADMIN — MICONAZOLE NITRATE: 1.42 POWDER TOPICAL at 00:28

## 2017-08-19 RX ADMIN — APIXABAN 5 MG: 5 TABLET, FILM COATED ORAL at 21:02

## 2017-08-19 RX ADMIN — ROPINIROLE HYDROCHLORIDE 3 MG: 2 TABLET, FILM COATED ORAL at 21:03

## 2017-08-19 RX ADMIN — POTASSIUM CHLORIDE 20 MEQ: 20 TABLET, EXTENDED RELEASE ORAL at 13:05

## 2017-08-19 RX ADMIN — CARBIDOPA AND LEVODOPA 1 TABLET: 25; 100 TABLET ORAL at 09:04

## 2017-08-19 RX ADMIN — CARBIDOPA AND LEVODOPA 1 TABLET: 25; 100 TABLET ORAL at 22:29

## 2017-08-19 RX ADMIN — PANTOPRAZOLE SODIUM 40 MG: 40 TABLET, DELAYED RELEASE ORAL at 06:28

## 2017-08-19 RX ADMIN — MOMETASONE FUROATE AND FORMOTEROL FUMARATE DIHYDRATE 2 PUFF: 200; 5 AEROSOL RESPIRATORY (INHALATION) at 21:30

## 2017-08-19 RX ADMIN — METOPROLOL TARTRATE 25 MG: 25 TABLET ORAL at 21:03

## 2017-08-19 RX ADMIN — METFORMIN HYDROCHLORIDE 1000 MG: 500 TABLET, FILM COATED ORAL at 17:34

## 2017-08-19 RX ADMIN — CYANOCOBALAMIN TAB 1000 MCG 1000 MCG: 1000 TAB at 09:05

## 2017-08-19 RX ADMIN — ATORVASTATIN CALCIUM 20 MG: 20 TABLET, FILM COATED ORAL at 21:09

## 2017-08-19 RX ADMIN — HYDROCODONE BITARTRATE AND ACETAMINOPHEN 1 TABLET: 5; 325 TABLET ORAL at 17:33

## 2017-08-19 RX ADMIN — CARBIDOPA AND LEVODOPA 1 TABLET: 25; 100 TABLET ORAL at 21:02

## 2017-08-19 RX ADMIN — ROPINIROLE HYDROCHLORIDE 3 MG: 2 TABLET, FILM COATED ORAL at 15:22

## 2017-08-19 RX ADMIN — ANTACID TABLETS 1500 MG: 500 TABLET, CHEWABLE ORAL at 09:09

## 2017-08-19 RX ADMIN — Medication 400 MG: at 09:04

## 2017-08-19 RX ADMIN — MICONAZOLE NITRATE: 1.42 POWDER TOPICAL at 21:03

## 2017-08-19 RX ADMIN — ROPINIROLE HYDROCHLORIDE 3 MG: 2 TABLET, FILM COATED ORAL at 09:03

## 2017-08-19 RX ADMIN — PREDNISONE 10 MG: 20 TABLET ORAL at 13:14

## 2017-08-19 ASSESSMENT — PAIN DESCRIPTION - DESCRIPTORS
DESCRIPTORS: ACHING
DESCRIPTORS: THROBBING

## 2017-08-19 ASSESSMENT — PAIN SCALES - GENERAL
PAINLEVEL_OUTOF10: 8
PAINLEVEL_OUTOF10: 7
PAINLEVEL_OUTOF10: 0
PAINLEVEL_OUTOF10: 0

## 2017-08-19 ASSESSMENT — PAIN DESCRIPTION - LOCATION
LOCATION: HEAD
LOCATION: HEAD

## 2017-08-19 ASSESSMENT — PAIN DESCRIPTION - FREQUENCY
FREQUENCY: INTERMITTENT
FREQUENCY: CONTINUOUS

## 2017-08-19 ASSESSMENT — PAIN DESCRIPTION - PROGRESSION
CLINICAL_PROGRESSION: NOT CHANGED
CLINICAL_PROGRESSION: GRADUALLY WORSENING

## 2017-08-19 ASSESSMENT — PAIN DESCRIPTION - ONSET
ONSET: ON-GOING
ONSET: PROGRESSIVE

## 2017-08-19 ASSESSMENT — PAIN DESCRIPTION - PAIN TYPE
TYPE: ACUTE PAIN
TYPE: ACUTE PAIN

## 2017-08-19 ASSESSMENT — PAIN DESCRIPTION - ORIENTATION
ORIENTATION: MID
ORIENTATION: OTHER (COMMENT);POSTERIOR

## 2017-08-20 VITALS
HEART RATE: 76 BPM | SYSTOLIC BLOOD PRESSURE: 167 MMHG | RESPIRATION RATE: 15 BRPM | OXYGEN SATURATION: 99 % | BODY MASS INDEX: 35.88 KG/M2 | DIASTOLIC BLOOD PRESSURE: 55 MMHG | WEIGHT: 190.04 LBS | TEMPERATURE: 98.5 F | HEIGHT: 61 IN

## 2017-08-20 LAB — GLUCOSE BLD-MCNC: 217 MG/DL (ref 65–105)

## 2017-08-20 PROCEDURE — 6360000002 HC RX W HCPCS: Performed by: INTERNAL MEDICINE

## 2017-08-20 PROCEDURE — 99232 SBSQ HOSP IP/OBS MODERATE 35: CPT | Performed by: INTERNAL MEDICINE

## 2017-08-20 PROCEDURE — 6370000000 HC RX 637 (ALT 250 FOR IP): Performed by: INTERNAL MEDICINE

## 2017-08-20 PROCEDURE — 94761 N-INVAS EAR/PLS OXIMETRY MLT: CPT

## 2017-08-20 PROCEDURE — 94640 AIRWAY INHALATION TREATMENT: CPT

## 2017-08-20 PROCEDURE — 2700000000 HC OXYGEN THERAPY PER DAY

## 2017-08-20 PROCEDURE — 82947 ASSAY GLUCOSE BLOOD QUANT: CPT

## 2017-08-20 RX ORDER — PREDNISONE 10 MG/1
10 TABLET ORAL DAILY
Qty: 5 TABLET | Refills: 0 | Status: ON HOLD | OUTPATIENT
Start: 2017-08-20 | End: 2017-08-27 | Stop reason: HOSPADM

## 2017-08-20 RX ORDER — DILTIAZEM HYDROCHLORIDE 240 MG/1
240 CAPSULE, COATED, EXTENDED RELEASE ORAL DAILY
Qty: 30 CAPSULE | Refills: 3 | Status: ON HOLD | OUTPATIENT
Start: 2017-08-20 | End: 2017-08-27 | Stop reason: HOSPADM

## 2017-08-20 RX ADMIN — CARBIDOPA AND LEVODOPA 1 TABLET: 25; 100 TABLET ORAL at 12:54

## 2017-08-20 RX ADMIN — ALBUTEROL SULFATE 2.5 MG: 2.5 SOLUTION RESPIRATORY (INHALATION) at 15:17

## 2017-08-20 RX ADMIN — FUROSEMIDE 20 MG: 20 TABLET ORAL at 07:53

## 2017-08-20 RX ADMIN — PANTOPRAZOLE SODIUM 40 MG: 40 TABLET, DELAYED RELEASE ORAL at 07:53

## 2017-08-20 RX ADMIN — LINAGLIPTIN 5 MG: 5 TABLET, FILM COATED ORAL at 07:57

## 2017-08-20 RX ADMIN — ROPINIROLE HYDROCHLORIDE 3 MG: 2 TABLET, FILM COATED ORAL at 07:56

## 2017-08-20 RX ADMIN — PANTOPRAZOLE SODIUM 40 MG: 40 TABLET, DELAYED RELEASE ORAL at 15:40

## 2017-08-20 RX ADMIN — SPIRONOLACTONE 25 MG: 25 TABLET, FILM COATED ORAL at 07:53

## 2017-08-20 RX ADMIN — CALCITRIOL 0.25 MCG: 0.25 CAPSULE, LIQUID FILLED ORAL at 15:25

## 2017-08-20 RX ADMIN — APIXABAN 5 MG: 5 TABLET, FILM COATED ORAL at 07:54

## 2017-08-20 RX ADMIN — DILTIAZEM HYDROCHLORIDE 240 MG: 240 CAPSULE, COATED, EXTENDED RELEASE ORAL at 07:56

## 2017-08-20 RX ADMIN — METOPROLOL TARTRATE 25 MG: 25 TABLET ORAL at 07:54

## 2017-08-20 RX ADMIN — METFORMIN HYDROCHLORIDE 1000 MG: 500 TABLET, FILM COATED ORAL at 07:54

## 2017-08-20 RX ADMIN — HYDROCODONE BITARTRATE AND ACETAMINOPHEN 1 TABLET: 5; 325 TABLET ORAL at 15:42

## 2017-08-20 RX ADMIN — POTASSIUM CHLORIDE 20 MEQ: 20 TABLET, EXTENDED RELEASE ORAL at 12:54

## 2017-08-20 RX ADMIN — PREDNISONE 10 MG: 20 TABLET ORAL at 07:54

## 2017-08-20 RX ADMIN — ROPINIROLE HYDROCHLORIDE 3 MG: 2 TABLET, FILM COATED ORAL at 15:43

## 2017-08-20 RX ADMIN — CALCITRIOL 0.25 MCG: 0.25 CAPSULE, LIQUID FILLED ORAL at 07:55

## 2017-08-20 RX ADMIN — Medication 400 MG: at 07:57

## 2017-08-20 RX ADMIN — MOMETASONE FUROATE AND FORMOTEROL FUMARATE DIHYDRATE 2 PUFF: 200; 5 AEROSOL RESPIRATORY (INHALATION) at 09:46

## 2017-08-20 RX ADMIN — RASAGILINE 0.5 MG: 0.5 TABLET ORAL at 07:56

## 2017-08-20 RX ADMIN — TIOTROPIUM BROMIDE INHALATION SPRAY 2 PUFF: 3.12 SPRAY, METERED RESPIRATORY (INHALATION) at 09:46

## 2017-08-20 RX ADMIN — CYANOCOBALAMIN TAB 1000 MCG 1000 MCG: 1000 TAB at 07:56

## 2017-08-20 RX ADMIN — CARBIDOPA AND LEVODOPA 1 TABLET: 25; 100 TABLET ORAL at 07:56

## 2017-08-20 RX ADMIN — ALBUTEROL SULFATE 2.5 MG: 2.5 SOLUTION RESPIRATORY (INHALATION) at 09:51

## 2017-08-20 RX ADMIN — ANTACID TABLETS 1500 MG: 500 TABLET, CHEWABLE ORAL at 07:53

## 2017-08-20 ASSESSMENT — PAIN SCALES - GENERAL
PAINLEVEL_OUTOF10: 0
PAINLEVEL_OUTOF10: 0
PAINLEVEL_OUTOF10: 7
PAINLEVEL_OUTOF10: 0

## 2017-08-21 ENCOUNTER — TELEPHONE (OUTPATIENT)
Dept: FAMILY MEDICINE CLINIC | Age: 72
End: 2017-08-21

## 2017-08-25 ENCOUNTER — HOSPITAL ENCOUNTER (INPATIENT)
Age: 72
LOS: 2 days | Discharge: HOME | DRG: 309 | End: 2017-08-27
Attending: EMERGENCY MEDICINE | Admitting: FAMILY MEDICINE
Payer: COMMERCIAL

## 2017-08-25 ENCOUNTER — APPOINTMENT (OUTPATIENT)
Dept: GENERAL RADIOLOGY | Age: 72
DRG: 309 | End: 2017-08-25
Payer: COMMERCIAL

## 2017-08-25 DIAGNOSIS — I48.91 ATRIAL FIBRILLATION WITH RVR (HCC): Primary | ICD-10-CM

## 2017-08-25 LAB
ABSOLUTE EOS #: 0 K/UL (ref 0–0.4)
ABSOLUTE LYMPH #: 2.4 K/UL (ref 1–4.8)
ABSOLUTE MONO #: 0.7 K/UL (ref 0.2–0.8)
ANION GAP SERPL CALCULATED.3IONS-SCNC: 12 MMOL/L (ref 9–17)
BASOPHILS # BLD: 1 %
BASOPHILS ABSOLUTE: 0.1 K/UL (ref 0–0.2)
BNP INTERPRETATION: NORMAL
BUN BLDV-MCNC: 27 MG/DL (ref 8–23)
BUN/CREAT BLD: 21 (ref 9–20)
CALCIUM SERPL-MCNC: 8.8 MG/DL (ref 8.6–10.4)
CHLORIDE BLD-SCNC: 94 MMOL/L (ref 98–107)
CO2: 31 MMOL/L (ref 20–31)
CREAT SERPL-MCNC: 1.29 MG/DL (ref 0.5–0.9)
D-DIMER QUANTITATIVE: 0.45 MG/L FEU
DIFFERENTIAL TYPE: ABNORMAL
EOSINOPHILS RELATIVE PERCENT: 0 %
GFR AFRICAN AMERICAN: 50 ML/MIN
GFR NON-AFRICAN AMERICAN: 41 ML/MIN
GFR SERPL CREATININE-BSD FRML MDRD: ABNORMAL ML/MIN/{1.73_M2}
GFR SERPL CREATININE-BSD FRML MDRD: ABNORMAL ML/MIN/{1.73_M2}
GLUCOSE BLD-MCNC: 314 MG/DL (ref 70–99)
HCT VFR BLD CALC: 29.8 % (ref 36–46)
HEMOGLOBIN: 9.8 G/DL (ref 12–16)
INR BLD: 1
LYMPHOCYTES # BLD: 18 %
MCH RBC QN AUTO: 30.3 PG (ref 26–34)
MCHC RBC AUTO-ENTMCNC: 32.8 G/DL (ref 31–37)
MCV RBC AUTO: 92.3 FL (ref 80–100)
MONOCYTES # BLD: 5 %
PDW BLD-RTO: 15.4 % (ref 11.5–14.5)
PLATELET # BLD: 220 K/UL (ref 130–400)
PLATELET ESTIMATE: ABNORMAL
PMV BLD AUTO: 7.9 FL (ref 6–12)
POTASSIUM SERPL-SCNC: 4.1 MMOL/L (ref 3.7–5.3)
PRO-BNP: 275 PG/ML
PROTHROMBIN TIME: 10.3 SEC (ref 9.7–11.6)
RBC # BLD: 3.23 M/UL (ref 4–5.2)
RBC # BLD: ABNORMAL 10*6/UL
SEG NEUTROPHILS: 76 %
SEGMENTED NEUTROPHILS ABSOLUTE COUNT: 10.5 K/UL (ref 1.8–7.7)
SODIUM BLD-SCNC: 137 MMOL/L (ref 135–144)
TROPONIN INTERP: NORMAL
TROPONIN T: <0.03 NG/ML
WBC # BLD: 13.8 K/UL (ref 3.5–11)
WBC # BLD: ABNORMAL 10*3/UL

## 2017-08-25 PROCEDURE — 96366 THER/PROPH/DIAG IV INF ADDON: CPT

## 2017-08-25 PROCEDURE — 96376 TX/PRO/DX INJ SAME DRUG ADON: CPT

## 2017-08-25 PROCEDURE — 84484 ASSAY OF TROPONIN QUANT: CPT

## 2017-08-25 PROCEDURE — 80048 BASIC METABOLIC PNL TOTAL CA: CPT

## 2017-08-25 PROCEDURE — 2500000003 HC RX 250 WO HCPCS: Performed by: EMERGENCY MEDICINE

## 2017-08-25 PROCEDURE — 83880 ASSAY OF NATRIURETIC PEPTIDE: CPT

## 2017-08-25 PROCEDURE — 2060000000 HC ICU INTERMEDIATE R&B

## 2017-08-25 PROCEDURE — 71010 XR CHEST PORTABLE: CPT

## 2017-08-25 PROCEDURE — 99285 EMERGENCY DEPT VISIT HI MDM: CPT

## 2017-08-25 PROCEDURE — 96375 TX/PRO/DX INJ NEW DRUG ADDON: CPT

## 2017-08-25 PROCEDURE — 85379 FIBRIN DEGRADATION QUANT: CPT

## 2017-08-25 PROCEDURE — 93005 ELECTROCARDIOGRAM TRACING: CPT

## 2017-08-25 PROCEDURE — 6360000002 HC RX W HCPCS

## 2017-08-25 PROCEDURE — 85025 COMPLETE CBC W/AUTO DIFF WBC: CPT

## 2017-08-25 PROCEDURE — 85610 PROTHROMBIN TIME: CPT

## 2017-08-25 PROCEDURE — 2580000003 HC RX 258: Performed by: EMERGENCY MEDICINE

## 2017-08-25 PROCEDURE — 96365 THER/PROPH/DIAG IV INF INIT: CPT

## 2017-08-25 RX ORDER — DILTIAZEM HYDROCHLORIDE 5 MG/ML
20 INJECTION INTRAVENOUS ONCE
Status: COMPLETED | OUTPATIENT
Start: 2017-08-25 | End: 2017-08-25

## 2017-08-25 RX ORDER — MORPHINE SULFATE 2 MG/ML
INJECTION, SOLUTION INTRAMUSCULAR; INTRAVENOUS
Status: COMPLETED
Start: 2017-08-25 | End: 2017-08-25

## 2017-08-25 RX ORDER — MORPHINE SULFATE 2 MG/ML
2 INJECTION, SOLUTION INTRAMUSCULAR; INTRAVENOUS ONCE
Status: COMPLETED | OUTPATIENT
Start: 2017-08-25 | End: 2017-08-25

## 2017-08-25 RX ADMIN — MORPHINE SULFATE 2 MG: 2 INJECTION, SOLUTION INTRAMUSCULAR; INTRAVENOUS at 20:54

## 2017-08-25 RX ADMIN — DILTIAZEM HYDROCHLORIDE 20 MG: 5 INJECTION INTRAVENOUS at 19:07

## 2017-08-25 RX ADMIN — DILTIAZEM HYDROCHLORIDE 5 MG/HR: 5 INJECTION INTRAVENOUS at 19:42

## 2017-08-25 ASSESSMENT — PAIN DESCRIPTION - PAIN TYPE: TYPE: ACUTE PAIN

## 2017-08-25 ASSESSMENT — ENCOUNTER SYMPTOMS
RESPIRATORY NEGATIVE: 1
GASTROINTESTINAL NEGATIVE: 1
ALLERGIC/IMMUNOLOGIC NEGATIVE: 1
EYES NEGATIVE: 1

## 2017-08-25 ASSESSMENT — PAIN DESCRIPTION - LOCATION
LOCATION: HEAD
LOCATION: CHEST

## 2017-08-25 ASSESSMENT — PAIN SCALES - GENERAL
PAINLEVEL_OUTOF10: 10
PAINLEVEL_OUTOF10: 4
PAINLEVEL_OUTOF10: 10

## 2017-08-26 LAB
ALBUMIN SERPL-MCNC: 3.8 G/DL (ref 3.5–5.2)
ALBUMIN/GLOBULIN RATIO: ABNORMAL (ref 1–2.5)
ALP BLD-CCNC: 52 U/L (ref 35–104)
ALT SERPL-CCNC: <5 U/L (ref 5–33)
ANION GAP SERPL CALCULATED.3IONS-SCNC: 9 MMOL/L (ref 9–17)
AST SERPL-CCNC: 12 U/L
BILIRUB SERPL-MCNC: 0.16 MG/DL (ref 0.3–1.2)
BUN BLDV-MCNC: 26 MG/DL (ref 8–23)
BUN/CREAT BLD: 20 (ref 9–20)
CALCIUM SERPL-MCNC: 8.3 MG/DL (ref 8.6–10.4)
CHLORIDE BLD-SCNC: 98 MMOL/L (ref 98–107)
CHOLESTEROL/HDL RATIO: 4.5
CHOLESTEROL: 135 MG/DL
CO2: 36 MMOL/L (ref 20–31)
CREAT SERPL-MCNC: 1.28 MG/DL (ref 0.5–0.9)
GFR AFRICAN AMERICAN: 50 ML/MIN
GFR NON-AFRICAN AMERICAN: 41 ML/MIN
GFR SERPL CREATININE-BSD FRML MDRD: ABNORMAL ML/MIN/{1.73_M2}
GFR SERPL CREATININE-BSD FRML MDRD: ABNORMAL ML/MIN/{1.73_M2}
GLUCOSE BLD-MCNC: 125 MG/DL (ref 65–105)
GLUCOSE BLD-MCNC: 139 MG/DL (ref 65–105)
GLUCOSE BLD-MCNC: 166 MG/DL (ref 70–99)
GLUCOSE BLD-MCNC: 176 MG/DL (ref 65–105)
GLUCOSE BLD-MCNC: 185 MG/DL (ref 65–105)
GLUCOSE BLD-MCNC: 244 MG/DL (ref 65–105)
HCT VFR BLD CALC: 25.2 % (ref 36–46)
HDLC SERPL-MCNC: 30 MG/DL
HEMOGLOBIN: 8.1 G/DL (ref 12–16)
LDL CHOLESTEROL: 73 MG/DL (ref 0–130)
MCH RBC QN AUTO: 29.4 PG (ref 26–34)
MCHC RBC AUTO-ENTMCNC: 32.2 G/DL (ref 31–37)
MCV RBC AUTO: 91.4 FL (ref 80–100)
PDW BLD-RTO: 15 % (ref 11.5–14.5)
PLATELET # BLD: 174 K/UL (ref 130–400)
PMV BLD AUTO: ABNORMAL FL (ref 6–12)
POTASSIUM SERPL-SCNC: 4.2 MMOL/L (ref 3.7–5.3)
RBC # BLD: 2.76 M/UL (ref 4–5.2)
SODIUM BLD-SCNC: 143 MMOL/L (ref 135–144)
TOTAL PROTEIN: 6.1 G/DL (ref 6.4–8.3)
TRIGL SERPL-MCNC: 158 MG/DL
TROPONIN INTERP: NORMAL
TROPONIN INTERP: NORMAL
TROPONIN T: <0.03 NG/ML
TROPONIN T: <0.03 NG/ML
VLDLC SERPL CALC-MCNC: ABNORMAL MG/DL (ref 1–30)
WBC # BLD: 11.6 K/UL (ref 3.5–11)

## 2017-08-26 PROCEDURE — 80061 LIPID PANEL: CPT

## 2017-08-26 PROCEDURE — 80053 COMPREHEN METABOLIC PANEL: CPT

## 2017-08-26 PROCEDURE — 6370000000 HC RX 637 (ALT 250 FOR IP): Performed by: INTERNAL MEDICINE

## 2017-08-26 PROCEDURE — 2060000000 HC ICU INTERMEDIATE R&B

## 2017-08-26 PROCEDURE — 94760 N-INVAS EAR/PLS OXIMETRY 1: CPT

## 2017-08-26 PROCEDURE — 85027 COMPLETE CBC AUTOMATED: CPT

## 2017-08-26 PROCEDURE — 2500000003 HC RX 250 WO HCPCS: Performed by: INTERNAL MEDICINE

## 2017-08-26 PROCEDURE — 2580000003 HC RX 258: Performed by: NURSE PRACTITIONER

## 2017-08-26 PROCEDURE — 99223 1ST HOSP IP/OBS HIGH 75: CPT | Performed by: INTERNAL MEDICINE

## 2017-08-26 PROCEDURE — 36415 COLL VENOUS BLD VENIPUNCTURE: CPT

## 2017-08-26 PROCEDURE — 6370000000 HC RX 637 (ALT 250 FOR IP): Performed by: NURSE PRACTITIONER

## 2017-08-26 PROCEDURE — 2700000000 HC OXYGEN THERAPY PER DAY

## 2017-08-26 PROCEDURE — 94664 DEMO&/EVAL PT USE INHALER: CPT

## 2017-08-26 PROCEDURE — 84484 ASSAY OF TROPONIN QUANT: CPT

## 2017-08-26 PROCEDURE — 82947 ASSAY GLUCOSE BLOOD QUANT: CPT

## 2017-08-26 PROCEDURE — 94640 AIRWAY INHALATION TREATMENT: CPT

## 2017-08-26 RX ORDER — MORPHINE SULFATE 4 MG/ML
4 INJECTION, SOLUTION INTRAMUSCULAR; INTRAVENOUS
Status: DISCONTINUED | OUTPATIENT
Start: 2017-08-26 | End: 2017-08-27 | Stop reason: HOSPADM

## 2017-08-26 RX ORDER — CALCITRIOL 0.25 UG/1
0.25 CAPSULE, LIQUID FILLED ORAL 3 TIMES DAILY
Status: DISCONTINUED | OUTPATIENT
Start: 2017-08-26 | End: 2017-08-27 | Stop reason: HOSPADM

## 2017-08-26 RX ORDER — SPIRONOLACTONE 25 MG/1
25 TABLET ORAL DAILY
Status: DISCONTINUED | OUTPATIENT
Start: 2017-08-26 | End: 2017-08-27 | Stop reason: HOSPADM

## 2017-08-26 RX ORDER — ALBUTEROL SULFATE 2.5 MG/3ML
2.5 SOLUTION RESPIRATORY (INHALATION)
Status: DISCONTINUED | OUTPATIENT
Start: 2017-08-26 | End: 2017-08-26

## 2017-08-26 RX ORDER — AMIODARONE HYDROCHLORIDE 200 MG/1
400 TABLET ORAL DAILY
Status: DISCONTINUED | OUTPATIENT
Start: 2017-08-26 | End: 2017-08-27

## 2017-08-26 RX ORDER — FUROSEMIDE 20 MG/1
20 TABLET ORAL DAILY
Status: DISCONTINUED | OUTPATIENT
Start: 2017-08-26 | End: 2017-08-27 | Stop reason: HOSPADM

## 2017-08-26 RX ORDER — DEXTROSE MONOHYDRATE 25 G/50ML
12.5 INJECTION, SOLUTION INTRAVENOUS PRN
Status: DISCONTINUED | OUTPATIENT
Start: 2017-08-26 | End: 2017-08-27 | Stop reason: HOSPADM

## 2017-08-26 RX ORDER — RASAGILINE 0.5 MG/1
0.5 TABLET ORAL DAILY
Status: DISCONTINUED | OUTPATIENT
Start: 2017-08-26 | End: 2017-08-27 | Stop reason: HOSPADM

## 2017-08-26 RX ORDER — POTASSIUM CHLORIDE 20 MEQ/1
20 TABLET, EXTENDED RELEASE ORAL DAILY
Status: DISCONTINUED | OUTPATIENT
Start: 2017-08-26 | End: 2017-08-27 | Stop reason: HOSPADM

## 2017-08-26 RX ORDER — SODIUM CHLORIDE 0.9 % (FLUSH) 0.9 %
10 SYRINGE (ML) INJECTION EVERY 12 HOURS SCHEDULED
Status: DISCONTINUED | OUTPATIENT
Start: 2017-08-26 | End: 2017-08-27 | Stop reason: HOSPADM

## 2017-08-26 RX ORDER — ALBUTEROL SULFATE 2.5 MG/3ML
2.5 SOLUTION RESPIRATORY (INHALATION) EVERY 6 HOURS PRN
Status: DISCONTINUED | OUTPATIENT
Start: 2017-08-26 | End: 2017-08-27 | Stop reason: HOSPADM

## 2017-08-26 RX ORDER — HYDROCODONE BITARTRATE AND ACETAMINOPHEN 5; 325 MG/1; MG/1
1 TABLET ORAL EVERY 4 HOURS PRN
Status: DISCONTINUED | OUTPATIENT
Start: 2017-08-26 | End: 2017-08-27 | Stop reason: HOSPADM

## 2017-08-26 RX ORDER — POTASSIUM CHLORIDE 7.45 MG/ML
10 INJECTION INTRAVENOUS PRN
Status: DISCONTINUED | OUTPATIENT
Start: 2017-08-26 | End: 2017-08-27 | Stop reason: HOSPADM

## 2017-08-26 RX ORDER — POTASSIUM CHLORIDE 20MEQ/15ML
40 LIQUID (ML) ORAL PRN
Status: DISCONTINUED | OUTPATIENT
Start: 2017-08-26 | End: 2017-08-27 | Stop reason: HOSPADM

## 2017-08-26 RX ORDER — AMIODARONE HYDROCHLORIDE 200 MG/1
200 TABLET ORAL ONCE
Status: COMPLETED | OUTPATIENT
Start: 2017-08-26 | End: 2017-08-26

## 2017-08-26 RX ORDER — ACETAMINOPHEN 325 MG/1
650 TABLET ORAL EVERY 4 HOURS PRN
Status: DISCONTINUED | OUTPATIENT
Start: 2017-08-26 | End: 2017-08-27 | Stop reason: HOSPADM

## 2017-08-26 RX ORDER — MORPHINE SULFATE 2 MG/ML
2 INJECTION, SOLUTION INTRAMUSCULAR; INTRAVENOUS
Status: DISCONTINUED | OUTPATIENT
Start: 2017-08-26 | End: 2017-08-27 | Stop reason: HOSPADM

## 2017-08-26 RX ORDER — NITROGLYCERIN 0.4 MG/1
0.4 TABLET SUBLINGUAL EVERY 5 MIN PRN
Status: DISCONTINUED | OUTPATIENT
Start: 2017-08-26 | End: 2017-08-27 | Stop reason: HOSPADM

## 2017-08-26 RX ORDER — BISACODYL 10 MG
10 SUPPOSITORY, RECTAL RECTAL DAILY PRN
Status: DISCONTINUED | OUTPATIENT
Start: 2017-08-26 | End: 2017-08-27 | Stop reason: HOSPADM

## 2017-08-26 RX ORDER — ONDANSETRON 2 MG/ML
4 INJECTION INTRAMUSCULAR; INTRAVENOUS EVERY 6 HOURS PRN
Status: DISCONTINUED | OUTPATIENT
Start: 2017-08-26 | End: 2017-08-27 | Stop reason: HOSPADM

## 2017-08-26 RX ORDER — MAGNESIUM SULFATE 1 G/100ML
1 INJECTION INTRAVENOUS PRN
Status: DISCONTINUED | OUTPATIENT
Start: 2017-08-26 | End: 2017-08-27 | Stop reason: HOSPADM

## 2017-08-26 RX ORDER — DEXTROSE MONOHYDRATE 50 MG/ML
100 INJECTION, SOLUTION INTRAVENOUS PRN
Status: DISCONTINUED | OUTPATIENT
Start: 2017-08-26 | End: 2017-08-27 | Stop reason: HOSPADM

## 2017-08-26 RX ORDER — LANOLIN ALCOHOL/MO/W.PET/CERES
1000 CREAM (GRAM) TOPICAL DAILY
Status: DISCONTINUED | OUTPATIENT
Start: 2017-08-26 | End: 2017-08-27 | Stop reason: HOSPADM

## 2017-08-26 RX ORDER — NICOTINE POLACRILEX 4 MG
15 LOZENGE BUCCAL PRN
Status: DISCONTINUED | OUTPATIENT
Start: 2017-08-26 | End: 2017-08-27 | Stop reason: HOSPADM

## 2017-08-26 RX ORDER — METOPROLOL TARTRATE 50 MG/1
50 TABLET, FILM COATED ORAL 2 TIMES DAILY
Status: DISCONTINUED | OUTPATIENT
Start: 2017-08-26 | End: 2017-08-27

## 2017-08-26 RX ORDER — PANTOPRAZOLE SODIUM 40 MG/1
40 TABLET, DELAYED RELEASE ORAL
Status: DISCONTINUED | OUTPATIENT
Start: 2017-08-26 | End: 2017-08-27 | Stop reason: HOSPADM

## 2017-08-26 RX ORDER — POTASSIUM CHLORIDE 7.45 MG/ML
10 INJECTION INTRAVENOUS PRN
Status: DISCONTINUED | OUTPATIENT
Start: 2017-08-26 | End: 2017-08-26 | Stop reason: SDUPTHER

## 2017-08-26 RX ORDER — POTASSIUM CHLORIDE 20 MEQ/1
40 TABLET, EXTENDED RELEASE ORAL PRN
Status: DISCONTINUED | OUTPATIENT
Start: 2017-08-26 | End: 2017-08-27 | Stop reason: HOSPADM

## 2017-08-26 RX ORDER — DOCUSATE SODIUM 100 MG/1
100 CAPSULE, LIQUID FILLED ORAL 2 TIMES DAILY
Status: DISCONTINUED | OUTPATIENT
Start: 2017-08-26 | End: 2017-08-26

## 2017-08-26 RX ORDER — CALCIUM CARBONATE 500(1250)
1500 TABLET ORAL DAILY
Status: DISCONTINUED | OUTPATIENT
Start: 2017-08-26 | End: 2017-08-27 | Stop reason: HOSPADM

## 2017-08-26 RX ORDER — SODIUM CHLORIDE 0.9 % (FLUSH) 0.9 %
10 SYRINGE (ML) INJECTION PRN
Status: DISCONTINUED | OUTPATIENT
Start: 2017-08-26 | End: 2017-08-27 | Stop reason: HOSPADM

## 2017-08-26 RX ORDER — DILTIAZEM HYDROCHLORIDE 240 MG/1
240 CAPSULE, COATED, EXTENDED RELEASE ORAL DAILY
Status: DISCONTINUED | OUTPATIENT
Start: 2017-08-26 | End: 2017-08-26

## 2017-08-26 RX ORDER — ATORVASTATIN CALCIUM 20 MG/1
20 TABLET, FILM COATED ORAL DAILY
Status: DISCONTINUED | OUTPATIENT
Start: 2017-08-26 | End: 2017-08-27 | Stop reason: HOSPADM

## 2017-08-26 RX ADMIN — ROPINIROLE HYDROCHLORIDE 3 MG: 2 TABLET, FILM COATED ORAL at 08:55

## 2017-08-26 RX ADMIN — APIXABAN 5 MG: 5 TABLET, FILM COATED ORAL at 01:56

## 2017-08-26 RX ADMIN — CARBIDOPA AND LEVODOPA 1 TABLET: 25; 100 TABLET ORAL at 08:55

## 2017-08-26 RX ADMIN — ASPIRIN 325 MG: 325 TABLET, DELAYED RELEASE ORAL at 08:55

## 2017-08-26 RX ADMIN — LINAGLIPTIN 5 MG: 5 TABLET, FILM COATED ORAL at 08:55

## 2017-08-26 RX ADMIN — TIOTROPIUM BROMIDE INHALATION SPRAY 2 PUFF: 3.12 SPRAY, METERED RESPIRATORY (INHALATION) at 08:37

## 2017-08-26 RX ADMIN — ATORVASTATIN CALCIUM 20 MG: 20 TABLET, FILM COATED ORAL at 08:55

## 2017-08-26 RX ADMIN — METOPROLOL TARTRATE 50 MG: 50 TABLET ORAL at 21:38

## 2017-08-26 RX ADMIN — POTASSIUM CHLORIDE 20 MEQ: 20 TABLET, EXTENDED RELEASE ORAL at 10:49

## 2017-08-26 RX ADMIN — CARBIDOPA AND LEVODOPA 1 TABLET: 25; 100 TABLET ORAL at 14:18

## 2017-08-26 RX ADMIN — CARBIDOPA AND LEVODOPA 1 TABLET: 25; 100 TABLET ORAL at 21:38

## 2017-08-26 RX ADMIN — FUROSEMIDE 20 MG: 20 TABLET ORAL at 08:55

## 2017-08-26 RX ADMIN — RASAGILINE MESYLATE 0.5 MG: 0.5 TABLET ORAL at 08:55

## 2017-08-26 RX ADMIN — SPIRONOLACTONE 25 MG: 25 TABLET, FILM COATED ORAL at 08:55

## 2017-08-26 RX ADMIN — CALCITRIOL 0.25 MCG: 0.25 CAPSULE, LIQUID FILLED ORAL at 08:55

## 2017-08-26 RX ADMIN — PANTOPRAZOLE SODIUM 40 MG: 40 TABLET, DELAYED RELEASE ORAL at 06:22

## 2017-08-26 RX ADMIN — CALCITRIOL 0.25 MCG: 0.25 CAPSULE, LIQUID FILLED ORAL at 14:19

## 2017-08-26 RX ADMIN — AMIODARONE HYDROCHLORIDE 200 MG: 200 TABLET ORAL at 15:52

## 2017-08-26 RX ADMIN — DILTIAZEM HYDROCHLORIDE 240 MG: 240 CAPSULE, COATED, EXTENDED RELEASE ORAL at 10:40

## 2017-08-26 RX ADMIN — MICONAZOLE NITRATE: 1.42 POWDER TOPICAL at 21:42

## 2017-08-26 RX ADMIN — HYDROCODONE BITARTRATE AND ACETAMINOPHEN 1 TABLET: 5; 325 TABLET ORAL at 08:54

## 2017-08-26 RX ADMIN — MOMETASONE FUROATE AND FORMOTEROL FUMARATE DIHYDRATE 2 PUFF: 200; 5 AEROSOL RESPIRATORY (INHALATION) at 22:01

## 2017-08-26 RX ADMIN — METOPROLOL TARTRATE 50 MG: 50 TABLET ORAL at 10:49

## 2017-08-26 RX ADMIN — CARBIDOPA AND LEVODOPA 1 TABLET: 25; 100 TABLET ORAL at 01:56

## 2017-08-26 RX ADMIN — MICONAZOLE NITRATE: 1.42 POWDER TOPICAL at 10:50

## 2017-08-26 RX ADMIN — CYANOCOBALAMIN TAB 1000 MCG 1000 MCG: 1000 TAB at 10:45

## 2017-08-26 RX ADMIN — APIXABAN 5 MG: 5 TABLET, FILM COATED ORAL at 21:38

## 2017-08-26 RX ADMIN — APIXABAN 5 MG: 5 TABLET, FILM COATED ORAL at 08:55

## 2017-08-26 RX ADMIN — ROPINIROLE HYDROCHLORIDE 3 MG: 2 TABLET, FILM COATED ORAL at 15:52

## 2017-08-26 RX ADMIN — Medication 400 MG: at 10:45

## 2017-08-26 RX ADMIN — HYDROCODONE BITARTRATE AND ACETAMINOPHEN 1 TABLET: 5; 325 TABLET ORAL at 21:37

## 2017-08-26 RX ADMIN — CALCITRIOL 0.25 MCG: 0.25 CAPSULE, LIQUID FILLED ORAL at 21:38

## 2017-08-26 RX ADMIN — ROPINIROLE HYDROCHLORIDE 3 MG: 2 TABLET, FILM COATED ORAL at 22:44

## 2017-08-26 RX ADMIN — PANTOPRAZOLE SODIUM 40 MG: 40 TABLET, DELAYED RELEASE ORAL at 15:54

## 2017-08-26 RX ADMIN — METFORMIN HYDROCHLORIDE 1000 MG: 500 TABLET, FILM COATED ORAL at 08:55

## 2017-08-26 RX ADMIN — Medication 10 ML: at 21:54

## 2017-08-26 RX ADMIN — AMIODARONE HYDROCHLORIDE 200 MG: 200 TABLET ORAL at 21:38

## 2017-08-26 ASSESSMENT — PAIN SCALES - GENERAL
PAINLEVEL_OUTOF10: 8
PAINLEVEL_OUTOF10: 8
PAINLEVEL_OUTOF10: 4

## 2017-08-26 ASSESSMENT — PAIN DESCRIPTION - LOCATION: LOCATION: HEAD

## 2017-08-26 ASSESSMENT — PAIN DESCRIPTION - PAIN TYPE: TYPE: ACUTE PAIN

## 2017-08-26 ASSESSMENT — PAIN DESCRIPTION - DESCRIPTORS: DESCRIPTORS: ACHING

## 2017-08-27 VITALS
SYSTOLIC BLOOD PRESSURE: 125 MMHG | BODY MASS INDEX: 36.38 KG/M2 | RESPIRATION RATE: 16 BRPM | DIASTOLIC BLOOD PRESSURE: 48 MMHG | OXYGEN SATURATION: 100 % | TEMPERATURE: 98.2 F | WEIGHT: 185.3 LBS | HEIGHT: 60 IN | HEART RATE: 63 BPM

## 2017-08-27 PROBLEM — N18.30 STAGE 3 CHRONIC KIDNEY DISEASE (HCC): Status: ACTIVE | Noted: 2017-08-27

## 2017-08-27 LAB
ABSOLUTE EOS #: 0.3 K/UL (ref 0–0.4)
ABSOLUTE LYMPH #: 2.6 K/UL (ref 1–4.8)
ABSOLUTE MONO #: 0.7 K/UL (ref 0.2–0.8)
BASOPHILS # BLD: 1 %
BASOPHILS ABSOLUTE: 0.1 K/UL (ref 0–0.2)
DIFFERENTIAL TYPE: ABNORMAL
EOSINOPHILS RELATIVE PERCENT: 2 %
GLUCOSE BLD-MCNC: 128 MG/DL (ref 65–105)
GLUCOSE BLD-MCNC: 142 MG/DL (ref 65–105)
HCT VFR BLD CALC: 27.2 % (ref 36–46)
HEMOGLOBIN: 8.8 G/DL (ref 12–16)
LYMPHOCYTES # BLD: 18 %
MCH RBC QN AUTO: 29.6 PG (ref 26–34)
MCHC RBC AUTO-ENTMCNC: 32.4 G/DL (ref 31–37)
MCV RBC AUTO: 91.4 FL (ref 80–100)
MONOCYTES # BLD: 5 %
PDW BLD-RTO: 14.7 % (ref 11.5–14.5)
PLATELET # BLD: 219 K/UL (ref 130–400)
PLATELET ESTIMATE: ABNORMAL
PMV BLD AUTO: ABNORMAL FL (ref 6–12)
RBC # BLD: 2.98 M/UL (ref 4–5.2)
RBC # BLD: ABNORMAL 10*6/UL
SEG NEUTROPHILS: 74 %
SEGMENTED NEUTROPHILS ABSOLUTE COUNT: 10.8 K/UL (ref 1.8–7.7)
WBC # BLD: 14.5 K/UL (ref 3.5–11)
WBC # BLD: ABNORMAL 10*3/UL

## 2017-08-27 PROCEDURE — 6370000000 HC RX 637 (ALT 250 FOR IP): Performed by: NURSE PRACTITIONER

## 2017-08-27 PROCEDURE — 36415 COLL VENOUS BLD VENIPUNCTURE: CPT

## 2017-08-27 PROCEDURE — 6370000000 HC RX 637 (ALT 250 FOR IP): Performed by: INTERNAL MEDICINE

## 2017-08-27 PROCEDURE — 94640 AIRWAY INHALATION TREATMENT: CPT

## 2017-08-27 PROCEDURE — 2700000000 HC OXYGEN THERAPY PER DAY

## 2017-08-27 PROCEDURE — 85025 COMPLETE CBC W/AUTO DIFF WBC: CPT

## 2017-08-27 PROCEDURE — 82947 ASSAY GLUCOSE BLOOD QUANT: CPT

## 2017-08-27 PROCEDURE — 99232 SBSQ HOSP IP/OBS MODERATE 35: CPT | Performed by: INTERNAL MEDICINE

## 2017-08-27 PROCEDURE — 94760 N-INVAS EAR/PLS OXIMETRY 1: CPT

## 2017-08-27 RX ORDER — AMIODARONE HYDROCHLORIDE 200 MG/1
200 TABLET ORAL DAILY
Status: DISCONTINUED | OUTPATIENT
Start: 2017-08-28 | End: 2017-08-27

## 2017-08-27 RX ORDER — AMIODARONE HYDROCHLORIDE 200 MG/1
200 TABLET ORAL DAILY
Qty: 30 TABLET | Refills: 3 | Status: ON HOLD | OUTPATIENT
Start: 2017-08-27 | End: 2018-09-07

## 2017-08-27 RX ORDER — AMIODARONE HYDROCHLORIDE 200 MG/1
200 TABLET ORAL DAILY
Status: DISCONTINUED | OUTPATIENT
Start: 2017-08-27 | End: 2017-08-27 | Stop reason: HOSPADM

## 2017-08-27 RX ADMIN — CALCITRIOL 0.25 MCG: 0.25 CAPSULE, LIQUID FILLED ORAL at 09:39

## 2017-08-27 RX ADMIN — ASPIRIN 325 MG: 325 TABLET, DELAYED RELEASE ORAL at 09:41

## 2017-08-27 RX ADMIN — CARBIDOPA AND LEVODOPA 1 TABLET: 25; 100 TABLET ORAL at 12:57

## 2017-08-27 RX ADMIN — POTASSIUM CHLORIDE 20 MEQ: 20 TABLET, EXTENDED RELEASE ORAL at 09:41

## 2017-08-27 RX ADMIN — MICONAZOLE NITRATE: 1.42 POWDER TOPICAL at 09:46

## 2017-08-27 RX ADMIN — LINAGLIPTIN 5 MG: 5 TABLET, FILM COATED ORAL at 09:40

## 2017-08-27 RX ADMIN — Medication 400 MG: at 09:42

## 2017-08-27 RX ADMIN — CARBIDOPA AND LEVODOPA 1 TABLET: 25; 100 TABLET ORAL at 09:41

## 2017-08-27 RX ADMIN — ROPINIROLE HYDROCHLORIDE 3 MG: 2 TABLET, FILM COATED ORAL at 12:58

## 2017-08-27 RX ADMIN — ATORVASTATIN CALCIUM 20 MG: 20 TABLET, FILM COATED ORAL at 09:39

## 2017-08-27 RX ADMIN — MOMETASONE FUROATE AND FORMOTEROL FUMARATE DIHYDRATE 2 PUFF: 200; 5 AEROSOL RESPIRATORY (INHALATION) at 09:19

## 2017-08-27 RX ADMIN — FUROSEMIDE 20 MG: 20 TABLET ORAL at 12:57

## 2017-08-27 RX ADMIN — ROPINIROLE HYDROCHLORIDE 3 MG: 2 TABLET, FILM COATED ORAL at 09:40

## 2017-08-27 RX ADMIN — METFORMIN HYDROCHLORIDE 1000 MG: 500 TABLET, FILM COATED ORAL at 09:42

## 2017-08-27 RX ADMIN — CYANOCOBALAMIN TAB 1000 MCG 1000 MCG: 1000 TAB at 09:39

## 2017-08-27 RX ADMIN — RASAGILINE MESYLATE 0.5 MG: 0.5 TABLET ORAL at 09:39

## 2017-08-27 RX ADMIN — SPIRONOLACTONE 25 MG: 25 TABLET, FILM COATED ORAL at 12:57

## 2017-08-27 RX ADMIN — CALCITRIOL 0.25 MCG: 0.25 CAPSULE, LIQUID FILLED ORAL at 12:57

## 2017-08-27 RX ADMIN — TIOTROPIUM BROMIDE INHALATION SPRAY 2 PUFF: 3.12 SPRAY, METERED RESPIRATORY (INHALATION) at 09:19

## 2017-08-27 RX ADMIN — PANTOPRAZOLE SODIUM 40 MG: 40 TABLET, DELAYED RELEASE ORAL at 06:27

## 2017-08-27 RX ADMIN — APIXABAN 5 MG: 5 TABLET, FILM COATED ORAL at 09:43

## 2017-08-27 RX ADMIN — METOPROLOL TARTRATE 25 MG: 25 TABLET ORAL at 09:43

## 2017-08-27 RX ADMIN — AMIODARONE HYDROCHLORIDE 200 MG: 200 TABLET ORAL at 09:53

## 2017-08-27 ASSESSMENT — PAIN SCALES - GENERAL: PAINLEVEL_OUTOF10: 0

## 2017-08-28 LAB
EKG ATRIAL RATE: 182 BPM
EKG Q-T INTERVAL: 286 MS
EKG QRS DURATION: 78 MS
EKG QTC CALCULATION (BAZETT): 468 MS
EKG R AXIS: -24 DEGREES
EKG T AXIS: 86 DEGREES
EKG VENTRICULAR RATE: 161 BPM

## 2017-08-29 ENCOUNTER — OFFICE VISIT (OUTPATIENT)
Dept: FAMILY MEDICINE CLINIC | Age: 72
End: 2017-08-29
Payer: COMMERCIAL

## 2017-08-29 VITALS
OXYGEN SATURATION: 100 % | SYSTOLIC BLOOD PRESSURE: 140 MMHG | DIASTOLIC BLOOD PRESSURE: 70 MMHG | WEIGHT: 185 LBS | RESPIRATION RATE: 16 BRPM | TEMPERATURE: 98.1 F | BODY MASS INDEX: 36.32 KG/M2 | HEART RATE: 67 BPM | HEIGHT: 60 IN

## 2017-08-29 DIAGNOSIS — Z09 ENCOUNTER FOR EXAMINATION FOLLOWING TREATMENT AT HOSPITAL: Primary | ICD-10-CM

## 2017-08-29 DIAGNOSIS — Z23 NEED FOR INFLUENZA VACCINATION: ICD-10-CM

## 2017-08-29 DIAGNOSIS — I48.91 ATRIAL FIBRILLATION WITH RAPID VENTRICULAR RESPONSE (HCC): ICD-10-CM

## 2017-08-29 PROCEDURE — 90662 IIV NO PRSV INCREASED AG IM: CPT | Performed by: FAMILY MEDICINE

## 2017-08-29 PROCEDURE — 1036F TOBACCO NON-USER: CPT | Performed by: FAMILY MEDICINE

## 2017-08-29 PROCEDURE — 99214 OFFICE O/P EST MOD 30 MIN: CPT | Performed by: FAMILY MEDICINE

## 2017-08-29 PROCEDURE — G8400 PT W/DXA NO RESULTS DOC: HCPCS | Performed by: FAMILY MEDICINE

## 2017-08-29 PROCEDURE — 4040F PNEUMOC VAC/ADMIN/RCVD: CPT | Performed by: FAMILY MEDICINE

## 2017-08-29 PROCEDURE — 90471 IMMUNIZATION ADMIN: CPT | Performed by: FAMILY MEDICINE

## 2017-08-29 PROCEDURE — G8417 CALC BMI ABV UP PARAM F/U: HCPCS | Performed by: FAMILY MEDICINE

## 2017-08-29 PROCEDURE — G8427 DOCREV CUR MEDS BY ELIG CLIN: HCPCS | Performed by: FAMILY MEDICINE

## 2017-08-29 PROCEDURE — 1111F DSCHRG MED/CURRENT MED MERGE: CPT | Performed by: FAMILY MEDICINE

## 2017-08-29 PROCEDURE — 3014F SCREEN MAMMO DOC REV: CPT | Performed by: FAMILY MEDICINE

## 2017-08-29 PROCEDURE — 1123F ACP DISCUSS/DSCN MKR DOCD: CPT | Performed by: FAMILY MEDICINE

## 2017-08-29 PROCEDURE — 3017F COLORECTAL CA SCREEN DOC REV: CPT | Performed by: FAMILY MEDICINE

## 2017-08-29 PROCEDURE — 1090F PRES/ABSN URINE INCON ASSESS: CPT | Performed by: FAMILY MEDICINE

## 2017-09-01 RX ORDER — SITAGLIPTIN AND METFORMIN HYDROCHLORIDE 1000; 50 MG/1; MG/1
TABLET, FILM COATED ORAL
Qty: 180 TABLET | Refills: 1 | Status: ON HOLD | OUTPATIENT
Start: 2017-09-01 | End: 2017-12-21 | Stop reason: HOSPADM

## 2017-09-03 ENCOUNTER — HOSPITAL ENCOUNTER (EMERGENCY)
Age: 72
Discharge: HOME | DRG: 309 | End: 2017-09-03
Attending: EMERGENCY MEDICINE
Payer: COMMERCIAL

## 2017-09-03 ENCOUNTER — APPOINTMENT (OUTPATIENT)
Dept: GENERAL RADIOLOGY | Age: 72
DRG: 309 | End: 2017-09-03
Payer: COMMERCIAL

## 2017-09-03 VITALS
SYSTOLIC BLOOD PRESSURE: 111 MMHG | HEIGHT: 61 IN | BODY MASS INDEX: 34.74 KG/M2 | WEIGHT: 184 LBS | DIASTOLIC BLOOD PRESSURE: 48 MMHG | HEART RATE: 73 BPM | TEMPERATURE: 98.6 F | RESPIRATION RATE: 9 BRPM | OXYGEN SATURATION: 100 %

## 2017-09-03 DIAGNOSIS — I48.91 ATRIAL FIBRILLATION WITH RVR (HCC): Primary | ICD-10-CM

## 2017-09-03 LAB
ABSOLUTE EOS #: 0.2 K/UL (ref 0–0.4)
ABSOLUTE LYMPH #: 2 K/UL (ref 1–4.8)
ABSOLUTE MONO #: 0.5 K/UL (ref 0.2–0.8)
ANION GAP SERPL CALCULATED.3IONS-SCNC: 14 MMOL/L (ref 9–17)
BASOPHILS # BLD: 0 %
BASOPHILS ABSOLUTE: 0 K/UL (ref 0–0.2)
BUN BLDV-MCNC: 28 MG/DL (ref 8–23)
BUN/CREAT BLD: 19 (ref 9–20)
CALCIUM SERPL-MCNC: 9.3 MG/DL (ref 8.6–10.4)
CHLORIDE BLD-SCNC: 93 MMOL/L (ref 98–107)
CO2: 33 MMOL/L (ref 20–31)
CREAT SERPL-MCNC: 1.45 MG/DL (ref 0.5–0.9)
DIFFERENTIAL TYPE: ABNORMAL
EOSINOPHILS RELATIVE PERCENT: 2 %
GFR AFRICAN AMERICAN: 43 ML/MIN
GFR NON-AFRICAN AMERICAN: 36 ML/MIN
GFR SERPL CREATININE-BSD FRML MDRD: ABNORMAL ML/MIN/{1.73_M2}
GFR SERPL CREATININE-BSD FRML MDRD: ABNORMAL ML/MIN/{1.73_M2}
GLUCOSE BLD-MCNC: 175 MG/DL (ref 70–99)
HCT VFR BLD CALC: 31 % (ref 36–46)
HEMOGLOBIN: 10.1 G/DL (ref 12–16)
INR BLD: 1
LYMPHOCYTES # BLD: 22 %
MCH RBC QN AUTO: 30.1 PG (ref 26–34)
MCHC RBC AUTO-ENTMCNC: 32.5 G/DL (ref 31–37)
MCV RBC AUTO: 92.7 FL (ref 80–100)
MONOCYTES # BLD: 6 %
MYOGLOBIN: 43 NG/ML (ref 25–58)
PARTIAL THROMBOPLASTIN TIME: 28.5 SEC (ref 23–31)
PDW BLD-RTO: 16.6 % (ref 11.5–14.5)
PLATELET # BLD: 150 K/UL (ref 130–400)
PLATELET ESTIMATE: ABNORMAL
PMV BLD AUTO: 8.5 FL (ref 6–12)
POTASSIUM SERPL-SCNC: 4 MMOL/L (ref 3.7–5.3)
PROTHROMBIN TIME: 10 SEC (ref 9.7–11.6)
RBC # BLD: 3.34 M/UL (ref 4–5.2)
RBC # BLD: ABNORMAL 10*6/UL
SEG NEUTROPHILS: 70 %
SEGMENTED NEUTROPHILS ABSOLUTE COUNT: 6.3 K/UL (ref 1.8–7.7)
SODIUM BLD-SCNC: 140 MMOL/L (ref 135–144)
TROPONIN INTERP: NORMAL
TROPONIN T: <0.03 NG/ML
WBC # BLD: 9 K/UL (ref 3.5–11)
WBC # BLD: ABNORMAL 10*3/UL

## 2017-09-03 PROCEDURE — 84484 ASSAY OF TROPONIN QUANT: CPT

## 2017-09-03 PROCEDURE — 2500000003 HC RX 250 WO HCPCS: Performed by: EMERGENCY MEDICINE

## 2017-09-03 PROCEDURE — 71010 XR CHEST PORTABLE: CPT

## 2017-09-03 PROCEDURE — 85610 PROTHROMBIN TIME: CPT

## 2017-09-03 PROCEDURE — 85730 THROMBOPLASTIN TIME PARTIAL: CPT

## 2017-09-03 PROCEDURE — 99285 EMERGENCY DEPT VISIT HI MDM: CPT

## 2017-09-03 PROCEDURE — 80048 BASIC METABOLIC PNL TOTAL CA: CPT

## 2017-09-03 PROCEDURE — 93005 ELECTROCARDIOGRAM TRACING: CPT

## 2017-09-03 PROCEDURE — 83874 ASSAY OF MYOGLOBIN: CPT

## 2017-09-03 PROCEDURE — 85025 COMPLETE CBC W/AUTO DIFF WBC: CPT

## 2017-09-03 PROCEDURE — 96374 THER/PROPH/DIAG INJ IV PUSH: CPT

## 2017-09-03 RX ORDER — DILTIAZEM HYDROCHLORIDE 5 MG/ML
20 INJECTION INTRAVENOUS ONCE
Status: COMPLETED | OUTPATIENT
Start: 2017-09-03 | End: 2017-09-03

## 2017-09-03 RX ADMIN — DILTIAZEM HYDROCHLORIDE 20 MG: 5 INJECTION INTRAVENOUS at 10:53

## 2017-09-03 ASSESSMENT — ENCOUNTER SYMPTOMS
SHORTNESS OF BREATH: 0
COLOR CHANGE: 0
COUGH: 0
CONSTIPATION: 0
ABDOMINAL PAIN: 0
DIARRHEA: 0
EYE REDNESS: 0
FACIAL SWELLING: 0
VOMITING: 0
EYE DISCHARGE: 0

## 2017-09-03 ASSESSMENT — PAIN DESCRIPTION - LOCATION: LOCATION: CHEST

## 2017-09-03 ASSESSMENT — PAIN DESCRIPTION - PAIN TYPE: TYPE: ACUTE PAIN

## 2017-09-03 ASSESSMENT — PAIN DESCRIPTION - DESCRIPTORS: DESCRIPTORS: SHARP

## 2017-09-03 ASSESSMENT — PAIN SCALES - GENERAL: PAINLEVEL_OUTOF10: 8

## 2017-09-05 LAB
EKG ATRIAL RATE: 178 BPM
EKG ATRIAL RATE: 74 BPM
EKG P AXIS: 36 DEGREES
EKG P-R INTERVAL: 204 MS
EKG Q-T INTERVAL: 302 MS
EKG Q-T INTERVAL: 400 MS
EKG QRS DURATION: 84 MS
EKG QRS DURATION: 86 MS
EKG QTC CALCULATION (BAZETT): 444 MS
EKG QTC CALCULATION (BAZETT): 485 MS
EKG R AXIS: -24 DEGREES
EKG R AXIS: -30 DEGREES
EKG T AXIS: 46 DEGREES
EKG T AXIS: 79 DEGREES
EKG VENTRICULAR RATE: 155 BPM
EKG VENTRICULAR RATE: 74 BPM

## 2017-09-19 RX ORDER — CLOPIDOGREL BISULFATE 75 MG/1
TABLET ORAL
Qty: 90 TABLET | Refills: 3 | Status: SHIPPED | OUTPATIENT
Start: 2017-09-19 | End: 2017-12-16

## 2017-09-19 RX ORDER — CALCITRIOL 0.25 UG/1
CAPSULE, LIQUID FILLED ORAL
Qty: 270 CAPSULE | Refills: 2 | Status: SHIPPED | OUTPATIENT
Start: 2017-09-19 | End: 2017-11-09 | Stop reason: SDUPTHER

## 2017-10-01 ENCOUNTER — PATIENT MESSAGE (OUTPATIENT)
Dept: FAMILY MEDICINE CLINIC | Age: 72
End: 2017-10-01

## 2017-10-01 DIAGNOSIS — G89.4 CHRONIC PAIN SYNDROME: ICD-10-CM

## 2017-10-03 RX ORDER — HYDROCODONE BITARTRATE AND ACETAMINOPHEN 5; 325 MG/1; MG/1
1 TABLET ORAL 2 TIMES DAILY PRN
Qty: 60 TABLET | Refills: 0 | Status: SHIPPED | OUTPATIENT
Start: 2017-10-03 | End: 2017-11-06 | Stop reason: SDUPTHER

## 2017-10-23 RX ORDER — ATORVASTATIN CALCIUM 20 MG/1
TABLET, FILM COATED ORAL
Qty: 90 TABLET | Refills: 2 | Status: SHIPPED | OUTPATIENT
Start: 2017-10-23 | End: 2018-07-20 | Stop reason: SDUPTHER

## 2017-11-03 ENCOUNTER — TELEPHONE (OUTPATIENT)
Dept: FAMILY MEDICINE CLINIC | Age: 72
End: 2017-11-03

## 2017-11-06 ENCOUNTER — PATIENT MESSAGE (OUTPATIENT)
Dept: FAMILY MEDICINE CLINIC | Age: 72
End: 2017-11-06

## 2017-11-06 DIAGNOSIS — G89.4 CHRONIC PAIN SYNDROME: ICD-10-CM

## 2017-11-06 RX ORDER — HYDROCODONE BITARTRATE AND ACETAMINOPHEN 5; 325 MG/1; MG/1
1 TABLET ORAL 2 TIMES DAILY PRN
Qty: 60 TABLET | Refills: 0 | Status: SHIPPED | OUTPATIENT
Start: 2017-11-06 | End: 2017-11-06 | Stop reason: SDUPTHER

## 2017-11-06 RX ORDER — HYDROCODONE BITARTRATE AND ACETAMINOPHEN 5; 325 MG/1; MG/1
1 TABLET ORAL 2 TIMES DAILY PRN
Qty: 60 TABLET | Refills: 0 | Status: SHIPPED | OUTPATIENT
Start: 2017-11-06 | End: 2018-01-26 | Stop reason: SDUPTHER

## 2017-11-06 NOTE — TELEPHONE ENCOUNTER
From: Reece Linda  To:  Oanh Terrell DO  Sent: 11/6/2017 6:39 AM EST  Subject: Prescription Question    wow November already!!!  this is jeremy borrero need a refill for my narco if I need a appointment let me know I hav about 20y left josiah borrero

## 2017-11-09 ENCOUNTER — OFFICE VISIT (OUTPATIENT)
Dept: FAMILY MEDICINE CLINIC | Age: 72
End: 2017-11-09
Payer: COMMERCIAL

## 2017-11-09 ENCOUNTER — HOSPITAL ENCOUNTER (OUTPATIENT)
Age: 72
Setting detail: SPECIMEN
Discharge: HOME | End: 2017-11-09
Payer: MEDICARE

## 2017-11-09 VITALS
WEIGHT: 184 LBS | HEART RATE: 68 BPM | HEIGHT: 61 IN | BODY MASS INDEX: 34.74 KG/M2 | RESPIRATION RATE: 16 BRPM | DIASTOLIC BLOOD PRESSURE: 48 MMHG | SYSTOLIC BLOOD PRESSURE: 114 MMHG

## 2017-11-09 DIAGNOSIS — E11.9 TYPE 2 DIABETES MELLITUS WITHOUT COMPLICATION, WITHOUT LONG-TERM CURRENT USE OF INSULIN (HCC): Primary | ICD-10-CM

## 2017-11-09 DIAGNOSIS — E11.9 TYPE 2 DIABETES MELLITUS WITHOUT COMPLICATION, WITHOUT LONG-TERM CURRENT USE OF INSULIN (HCC): ICD-10-CM

## 2017-11-09 DIAGNOSIS — I48.20 CHRONIC ATRIAL FIBRILLATION (HCC): ICD-10-CM

## 2017-11-09 DIAGNOSIS — E21.3 HYPERPARATHYROIDISM (HCC): ICD-10-CM

## 2017-11-09 DIAGNOSIS — R60.0 EDEMA OF BOTH LEGS: ICD-10-CM

## 2017-11-09 DIAGNOSIS — Z11.59 ENCOUNTER FOR HEPATITIS C SCREENING TEST FOR LOW RISK PATIENT: ICD-10-CM

## 2017-11-09 DIAGNOSIS — K21.9 GASTROESOPHAGEAL REFLUX DISEASE WITHOUT ESOPHAGITIS: ICD-10-CM

## 2017-11-09 DIAGNOSIS — I10 ESSENTIAL HYPERTENSION: ICD-10-CM

## 2017-11-09 LAB
CREATININE URINE: 125.6 MG/DL (ref 28–217)
HEPATITIS C ANTIBODY: NONREACTIVE
MICROALBUMIN/CREAT 24H UR: 218 MG/L
MICROALBUMIN/CREAT UR-RTO: 174 MCG/MG CREAT

## 2017-11-09 PROCEDURE — 99214 OFFICE O/P EST MOD 30 MIN: CPT | Performed by: FAMILY MEDICINE

## 2017-11-09 PROCEDURE — 4040F PNEUMOC VAC/ADMIN/RCVD: CPT | Performed by: FAMILY MEDICINE

## 2017-11-09 PROCEDURE — G8427 DOCREV CUR MEDS BY ELIG CLIN: HCPCS | Performed by: FAMILY MEDICINE

## 2017-11-09 PROCEDURE — 1090F PRES/ABSN URINE INCON ASSESS: CPT | Performed by: FAMILY MEDICINE

## 2017-11-09 PROCEDURE — G8484 FLU IMMUNIZE NO ADMIN: HCPCS | Performed by: FAMILY MEDICINE

## 2017-11-09 PROCEDURE — G8417 CALC BMI ABV UP PARAM F/U: HCPCS | Performed by: FAMILY MEDICINE

## 2017-11-09 PROCEDURE — 3017F COLORECTAL CA SCREEN DOC REV: CPT | Performed by: FAMILY MEDICINE

## 2017-11-09 PROCEDURE — 3044F HG A1C LEVEL LT 7.0%: CPT | Performed by: FAMILY MEDICINE

## 2017-11-09 PROCEDURE — 1036F TOBACCO NON-USER: CPT | Performed by: FAMILY MEDICINE

## 2017-11-09 PROCEDURE — 1123F ACP DISCUSS/DSCN MKR DOCD: CPT | Performed by: FAMILY MEDICINE

## 2017-11-09 PROCEDURE — 3014F SCREEN MAMMO DOC REV: CPT | Performed by: FAMILY MEDICINE

## 2017-11-09 PROCEDURE — G8400 PT W/DXA NO RESULTS DOC: HCPCS | Performed by: FAMILY MEDICINE

## 2017-11-09 RX ORDER — AMIODARONE HYDROCHLORIDE 200 MG/1
200 TABLET ORAL
COMMUNITY
Start: 2017-10-13 | End: 2017-11-09

## 2017-11-09 RX ORDER — CALCITRIOL 0.25 UG/1
CAPSULE, LIQUID FILLED ORAL
Qty: 270 CAPSULE | Refills: 2 | Status: ON HOLD | OUTPATIENT
Start: 2017-11-09 | End: 2018-08-22 | Stop reason: SDUPTHER

## 2017-11-09 RX ORDER — FUROSEMIDE 20 MG/1
20 TABLET ORAL DAILY
Qty: 90 TABLET | Refills: 3 | Status: ON HOLD | OUTPATIENT
Start: 2017-11-09 | End: 2017-12-16 | Stop reason: DRUGHIGH

## 2017-11-09 RX ORDER — PANTOPRAZOLE SODIUM 40 MG/1
40 TABLET, DELAYED RELEASE ORAL
Qty: 180 TABLET | Refills: 3 | Status: SHIPPED | OUTPATIENT
Start: 2017-11-09 | End: 2018-01-22 | Stop reason: SDUPTHER

## 2017-11-09 RX ORDER — SPIRONOLACTONE 25 MG/1
25 TABLET ORAL DAILY
Qty: 90 TABLET | Refills: 3 | Status: ON HOLD | OUTPATIENT
Start: 2017-11-09 | End: 2017-12-21 | Stop reason: HOSPADM

## 2017-11-09 NOTE — PROGRESS NOTES
07/18/2018    Lipid screen  08/26/2018    Colon cancer screen colonoscopy  04/25/2020    Zostavax vaccine  Completed    Flu vaccine  Completed    Pneumococcal low/med risk  Completed

## 2017-11-09 NOTE — PROGRESS NOTES
[facility]YS[facility]  COMPREHENSIVE CARE  [address]  [address] [address]  Dept: [phone]      Amelia Matias is a 81 y.o. female who presents today for follow up on her  medical conditions as noted below.       Chief Complaint   Patient presents with    Medication Refill     med refills,        Patient Active Problem List:     Type II or unspecified type diabetes mellitus without mention of complication, not stated as uncontrolled     Hyperlipidemia     Hypertension     Essential hypertension     Type 2 diabetes mellitus without complication (HCC)     Chronic leg pain     Chronic atrial fibrillation (HCC)     Asthma     Gastroesophageal reflux disease without esophagitis     Sleep apnea     ECTOR on CPAP     Type 2 diabetes mellitus without complication, without long-term current use of insulin (HCC)     Spinal stenosis in cervical region     Hypomagnesemia     Hyperphosphaturia     Postoperative anemia due to acute blood loss     Hypocalcemia     Aspiration pneumonitis (HCC)     Parkinson disease (HCC)     Syncope and collapse     Gastrointestinal hemorrhage     Acute renal failure (HCC)     Acute cystitis without hematuria     Mental status change     Iron deficiency anemia due to chronic blood loss     Morbid obesity with BMI of 40.0-44.9, adult ([facility]     HCAP (healthcare-associated pneumonia)     Acute respiratory failure with hypercapnia (HCC)     COPD with exacerbation (HCC)     Acute on chronic diastolic congestive heart failure (HCC)     Hyperplastic polyp of intestine     Diverticulosis     Panlobular emphysema (HCC)     Rapid atrial fibrillation (HCC)     Atrial fibrillation with RVR (HCC)     Stage 3 chronic kidney disease     Past Medical History:   Diagnosis Date    Acute on chronic diastolic congestive heart failure (HCC)     Asthma     Atrial fibrillation (HCC)     Cataracts, bilateral     Cerebral artery occlusion with cerebral infarction ([facility]     Last stroke was normal reflexes. Skin: Skin is warm and dry. No rash noted. Psychiatric: She has a normal mood and affect. Her   behavior is normal.       Assessment:      1. Type 2 diabetes mellitus without complication, without long-term current use of insulin ([facility]    2. Edema of both legs    3. Chronic atrial fibrillation ([facility]    4. Essential hypertension    5. Gastroesophageal reflux disease without esophagitis    6. Hyperparathyroidism ([facility]    7. Encounter for hepatitis C screening test for low risk patient          Plan:      Call or return to clinic prn if these symptoms worsen or fail to improve as anticipated. I have reviewed the instructions with the patient, answering all questions to her satisfaction. No Follow-up on file.   Orders Placed This Encounter   Procedures    Hepatitis C Antibody     Standing Status:   Future     Number of Occurrences:   1     Standing Expiration Date:   11/9/2018    Microalbumin, Ur     Standing Status:   Future     Standing Expiration Date:   11/9/2018     Orders Placed This Encounter   Medications    calcitRIOL (ROCALTROL) 0.25 MCG capsule     Sig: TAKE 1 CAPSULE THREE TIMES A DAY     Dispense:  270 capsule     Refill:  2    pantoprazole (PROTONIX) 40 MG tablet     Sig: Take 1 tablet by mouth 2 times daily (before meals)     Dispense:  180 tablet     Refill:  3    metoprolol tartrate (LOPRESSOR) 25 MG tablet     Sig: Take 1 tablet by mouth 2 times daily     Dispense:  180 tablet     Refill:  3    furosemide (LASIX) 20 MG tablet     Sig: Take 1 tablet by mouth daily     Dispense:  90 tablet     Refill:  3    spironolactone (ALDACTONE) 25 MG tablet     Sig: Take 1 tablet by mouth daily     Dispense:  90 tablet     Refill:  3    apixaban (ELIQUIS) 5 MG TABS tablet     Sig: Take 1 tablet by mouth 2 times daily     Dispense:  180 tablet     Refill:  3       Electronically signed by Shanda Castanon DO on 11/9/2017 at 1:08 PM

## 2017-11-16 ENCOUNTER — OFFICE VISIT (OUTPATIENT)
Dept: FAMILY MEDICINE CLINIC | Age: 72
End: 2017-11-16
Payer: COMMERCIAL

## 2017-11-16 VITALS
OXYGEN SATURATION: 97 % | HEIGHT: 61 IN | WEIGHT: 184.08 LBS | RESPIRATION RATE: 18 BRPM | TEMPERATURE: 97.8 F | BODY MASS INDEX: 34.76 KG/M2 | HEART RATE: 66 BPM | SYSTOLIC BLOOD PRESSURE: 126 MMHG | DIASTOLIC BLOOD PRESSURE: 82 MMHG

## 2017-11-16 DIAGNOSIS — R60.0 EDEMA OF BOTH LEGS: Primary | ICD-10-CM

## 2017-11-16 PROCEDURE — 1123F ACP DISCUSS/DSCN MKR DOCD: CPT | Performed by: FAMILY MEDICINE

## 2017-11-16 PROCEDURE — 4040F PNEUMOC VAC/ADMIN/RCVD: CPT | Performed by: FAMILY MEDICINE

## 2017-11-16 PROCEDURE — G8417 CALC BMI ABV UP PARAM F/U: HCPCS | Performed by: FAMILY MEDICINE

## 2017-11-16 PROCEDURE — G8400 PT W/DXA NO RESULTS DOC: HCPCS | Performed by: FAMILY MEDICINE

## 2017-11-16 PROCEDURE — 3014F SCREEN MAMMO DOC REV: CPT | Performed by: FAMILY MEDICINE

## 2017-11-16 PROCEDURE — 3017F COLORECTAL CA SCREEN DOC REV: CPT | Performed by: FAMILY MEDICINE

## 2017-11-16 PROCEDURE — 99213 OFFICE O/P EST LOW 20 MIN: CPT | Performed by: FAMILY MEDICINE

## 2017-11-16 PROCEDURE — 1090F PRES/ABSN URINE INCON ASSESS: CPT | Performed by: FAMILY MEDICINE

## 2017-11-16 PROCEDURE — G8427 DOCREV CUR MEDS BY ELIG CLIN: HCPCS | Performed by: FAMILY MEDICINE

## 2017-11-16 PROCEDURE — G8484 FLU IMMUNIZE NO ADMIN: HCPCS | Performed by: FAMILY MEDICINE

## 2017-11-16 PROCEDURE — 1036F TOBACCO NON-USER: CPT | Performed by: FAMILY MEDICINE

## 2017-11-16 RX ORDER — POTASSIUM CHLORIDE 750 MG/1
20 CAPSULE, EXTENDED RELEASE ORAL 2 TIMES DAILY
Qty: 60 CAPSULE | Refills: 3 | Status: ON HOLD | OUTPATIENT
Start: 2017-11-16 | End: 2017-12-21 | Stop reason: HOSPADM

## 2017-11-16 NOTE — PROGRESS NOTES
[facility] PHYSICIANS  COMPREHENSIVE CARE  [address]  [address] [address]  Dept: [phone]      Sukhjinder Sotelo is a 81 y.o. female who presents today for follow up on her  medical conditions as noted below. Chief Complaint   Patient presents with    Leg Pain     states she has a bruise on her leg. states she didn't bump it. started last week-i spoke with her before the weekend-at that time there were no other symptoms other than the pain-she denied swelling, redness, not warm to touch. she was instructed to go to the ER if she became SOB or any of the symptoms appeared.         Patient Active Problem List:     Type II or unspecified type diabetes mellitus without mention of complication, not stated as uncontrolled     Hyperlipidemia     Hypertension     Essential hypertension     Type 2 diabetes mellitus without complication (HCC)     Chronic leg pain     Chronic atrial fibrillation (HCC)     Asthma     Gastroesophageal reflux disease without esophagitis     Sleep apnea     ECTOR on CPAP     Type 2 diabetes mellitus without complication, without long-term current use of insulin (HCC)     Spinal stenosis in cervical region     Hypomagnesemia     Hyperphosphaturia     Postoperative anemia due to acute blood loss     Hypocalcemia     Aspiration pneumonitis (HCC)     Parkinson disease (HCC)     Syncope and collapse     Gastrointestinal hemorrhage     Acute renal failure (HCC)     Acute cystitis without hematuria     Mental status change     Iron deficiency anemia due to chronic blood loss     Morbid obesity with BMI of 40.0-44.9, adult ([address]     HCAP (healthcare-associated pneumonia)     Acute respiratory failure with hypercapnia (HCC)     COPD with exacerbation (HCC)     Acute on chronic diastolic congestive heart failure (HCC)     Hyperplastic polyp of intestine     Diverticulosis     Panlobular emphysema (HCC)     Rapid atrial fibrillation (HCC)     Atrial fibrillation with RVR ([address] Stage 3 chronic kidney disease     Past Medical History:   Diagnosis Date    Acute on chronic diastolic congestive heart failure (HCC)     Asthma     Atrial fibrillation (HCC)     Cataracts, bilateral     Cerebral artery occlusion with cerebral infarction [facility])     Last stroke was February 2017 w/no deficits    Chronic kidney disease     Constipation     COPD (chronic obstructive pulmonary disease) ([facility]     PT. SEES DR. BECK    Diverticulosis     DM2 (diabetes mellitus, type 2) ([facility]     Full dentures     GERD (gastroesophageal reflux disease)     Headache(784.0)     Hyperlipidemia     Hyperplastic polyp of intestine     Hypertension     PT.  DOES NOT SEE A CARDIOLOGIST    Morbid obesity with BMI of 40.0-44.9, adult ([facility] 12/30/2016    ECTOR on CPAP     Osteoarthritis     Parkinson disease ([facility]     Spinal stenosis in cervical region 6/2/2013    Type II or unspecified type diabetes mellitus without mention of complication, not stated as uncontrolled     Unspecified sleep apnea     cpap nightly    Wears glasses       Past Surgical History:   Procedure Laterality Date    ABDOMEN SURGERY      APPENDECTOMY      BACK SURGERY      CATARACT REMOVAL WITH IMPLANT Bilateral 2007   [address] SURGERY  2012    CERVICAL SPINE SURGERY  5/31/13    posterior [id number]    COLONOSCOPY  2009    COLONOSCOPY  12/29/2016    incomplete was not cleaned out    COLONOSCOPY  12/30/2016    COLONOSCOPY  04/25/2017     SIGMOID COLON POLYPECTOMY:  HYPERPLASTIC POLYP ,   DIVERTICULOSIS    EYE SURGERY Bilateral     CATARACTS W/ IOL    HERNIA REPAIR  1999    VENTRAL    LAMINECTOMY  5/31/13    Dr. Tobe Bamberger FLX W/REMOVAL LESION BY HOT BX FORCEPS N/A 4/25/2017    COLONOSCOPY POLYPECTOMY HOT BIOPSY performed by Kiran Bowman MD at [facility] ENDOSCOPY  12/28/2016    gastritis, esophagitis,      Family History   Problem Relation Age of Onset    Heart Failure Mother     Hypertension Mother     Heart Disease Mother     High Blood Pressure Mother     Asthma Other        Current Outpatient Prescriptions   Medication Sig Dispense Refill    potassium chloride (MICRO-K) 10 MEQ extended release capsule Take 2 capsules by mouth 2 times daily 60 capsule 3    calcitRIOL (ROCALTROL) 0.25 MCG capsule TAKE 1 CAPSULE THREE TIMES A  capsule 2    pantoprazole (PROTONIX) 40 MG tablet Take 1 tablet by mouth 2 times daily (before meals) 180 tablet 3    metoprolol tartrate (LOPRESSOR) 25 MG tablet Take 1 tablet by mouth 2 times daily 180 tablet 3    furosemide (LASIX) 20 MG tablet Take 1 tablet by mouth daily 90 tablet 3    spironolactone (ALDACTONE) 25 MG tablet Take 1 tablet by mouth daily 90 tablet 3    apixaban (ELIQUIS) 5 MG TABS tablet Take 1 tablet by mouth 2 times daily 180 tablet 3    HYDROcodone-acetaminophen (NORCO) 5-325 MG per tablet Take 1 tablet by mouth 2 times daily as needed for Pain . 60 tablet 0    atorvastatin (LIPITOR) 20 MG tablet TAKE 1 TABLET DAILY 90 tablet 2    clopidogrel (PLAVIX) 75 MG tablet TAKE 1 TABLET DAILY 90 tablet 3    JANUMET  MG per tablet TAKE 1 TABLET TWICE A DAY WITH MEALS 180 tablet 1    amiodarone (CORDARONE) 200 MG tablet Take 1 tablet by mouth daily 30 tablet 3    tiotropium (SPIRIVA RESPIMAT) 2.5 MCG/ACT AERS inhaler Inhale 2 puffs into the lungs daily      miconazole (MICOTIN) 2 % powder Apply topically 2 times daily.  45 g 1    vitamin B-12 1000 MCG tablet Take 1 tablet by mouth daily 30 tablet 3    albuterol (PROVENTIL) (2.5 MG/3ML) 0.083% nebulizer solution Take 3 mLs by nebulization every 6 hours as needed for Wheezing 360 each 3    magnesium oxide (MAG-OX) 400 MG tablet Take 400 mg by mouth daily      calcium elemental (OSCAL) 500 MG TABS tablet Take 1,500 mg by mouth daily      potassium chloride (KLOR-CON M) 20 MEQ extended release tablet Take 1 tablet by mouth daily      Oxygen Concentrator Dx: Pneumonia, use as directed. 1 each 0    mometasone-formoterol (DULERA) 200-5 MCG/ACT inhaler Inhale 2 puffs into the lungs 2 times daily 1 Inhaler 1    AZILECT 0.5 MG TABS Take 0.5 mg by mouth daily      rOPINIRole (REQUIP) 3 MG tablet Take 1 tablet by mouth 3 times daily 270 tablet 3    carbidopa-levodopa (SINEMET)  MG per tablet Take 1 tablet by mouth 4 times daily 360 tablet 3    PROAIR  (90 BASE) MCG/ACT inhaler Inhale 2 puffs into the lungs every 6 hours as needed for Shortness of Breath   0     No current facility-administered medications for this visit. ALLERGIES:    Allergies   Allergen Reactions    Pcn [Penicillins] Itching and Swelling    Dye [Barium-Containing Compounds]     Red Dye Itching and Rash       Social History   Substance Use Topics    Smoking status: Former Smoker     Packs/day: 2.00     Years: 15.00     Types: Cigarettes     Quit date: 10/31/1980    Smokeless tobacco: Never Used    Alcohol use 1.2 oz/week     2 Shots of liquor per week      Comment: socially - one black [place] 1/week \"at dinner\"        LDL Cholesterol (mg/dL)   Date Value   08/26/2017 73     HDL (mg/dL)   Date Value   08/26/2017 30 (L)     BUN (mg/dL)   Date Value   09/03/2017 28 (H)     CREATININE (mg/dL)   Date Value   09/03/2017 1.45 (H)     Glucose (mg/dL)   Date Value   09/03/2017 175 (H)   03/07/2012 132 (H)     Hemoglobin A1C (%)   Date Value   07/18/2017 6.2     Microalb/Crt. Ratio (mcg/mg creat)   Date Value   11/09/2017 174 (H)              Subjective:      HPI  She is here today because she noticed a large bruise on her anterior left tibia area and also that her legs are swelling a lot over the last week or so. She doesn't remember having done anything to her leg. Her  states she does consume a lot of salty food items and over the last week she has been on her legs more    Review of Systems:     Constitutional: Negative for fever, appetite change and fatigue. no rales. Abdominal: Soft. Bowel sounds are normal. She exhibits no distension and no mass. There is no tenderness. There is no rebound and no guarding. Genitourinary/Anorectal:deferred  Musculoskeletal: Normal range of motion. She exhibits 2=pitting pretibial  edema or tenderness. 1 cm bruise left tibial area   Lymphadenopathy: She has no cervical adenopathy. Neurological: She is alert and oriented to person, place, and time. She has normal reflexes. Skin: Skin is warm and dry. No rash noted. Psychiatric: She has a normal mood and affect. Her   behavior is normal.       Assessment:      1. Edema of both legs          Plan:      Call or return to clinic prn if these symptoms worsen or fail to improve as anticipated. I have reviewed the instructions with the patient, answering all questions to her satisfaction. No Follow-up on file. No orders of the defined types were placed in this encounter.     Orders Placed This Encounter   Medications    potassium chloride (MICRO-K) 10 MEQ extended release capsule     Sig: Take 2 capsules by mouth 2 times daily     Dispense:  60 capsule     Refill:  3     Increase lasix to 40 mg   No salt   Fu if not improved   Electronically signed by Sylwia Leone DO on 11/16/2017 at 1:30 PM

## 2017-11-16 NOTE — PROGRESS NOTES
Have you seen any other physician or provider since your last visit no    Have you had any other diagnostic tests since your last visit? no    Have you changed or stopped any medications since your last visit including any over-the-counter medicines, vitamins, or herbal medicines? no     Are you taking all your prescribed medications? Yes  If NO, why? -  N/A           Patient Self-Management Goal for this visit.    What is your goal for your visit today? - leg pain   Barriers to success: none   Plan for overcoming my barriers: N/A      Confidence: 10/10   Date goal set: 11/16/17   Date expected to reach goal: 2days

## 2017-12-16 ENCOUNTER — APPOINTMENT (OUTPATIENT)
Dept: MRI IMAGING | Age: 72
DRG: 682 | End: 2017-12-16
Payer: COMMERCIAL

## 2017-12-16 ENCOUNTER — APPOINTMENT (OUTPATIENT)
Dept: CT IMAGING | Age: 72
DRG: 682 | End: 2017-12-16
Payer: COMMERCIAL

## 2017-12-16 ENCOUNTER — HOSPITAL ENCOUNTER (INPATIENT)
Age: 72
LOS: 5 days | Discharge: NURSING, REHAB OR LONG-TERM CARE | DRG: 682 | End: 2017-12-21
Attending: EMERGENCY MEDICINE | Admitting: INTERNAL MEDICINE
Payer: COMMERCIAL

## 2017-12-16 ENCOUNTER — APPOINTMENT (OUTPATIENT)
Dept: GENERAL RADIOLOGY | Age: 72
DRG: 682 | End: 2017-12-16
Payer: COMMERCIAL

## 2017-12-16 DIAGNOSIS — R56.1 SEIZURE AFTER HEAD INJURY (HCC): ICD-10-CM

## 2017-12-16 DIAGNOSIS — E87.20 LACTIC ACIDOSIS: ICD-10-CM

## 2017-12-16 DIAGNOSIS — N17.9 ACUTE KIDNEY INJURY (HCC): ICD-10-CM

## 2017-12-16 DIAGNOSIS — R55 SYNCOPE AND COLLAPSE: Primary | ICD-10-CM

## 2017-12-16 DIAGNOSIS — D64.9 ANEMIA, UNSPECIFIED TYPE: ICD-10-CM

## 2017-12-16 LAB
-: NORMAL
ABSOLUTE EOS #: 0.1 K/UL (ref 0–0.4)
ABSOLUTE IMMATURE GRANULOCYTE: ABNORMAL K/UL (ref 0–0.3)
ABSOLUTE LYMPH #: 1.2 K/UL (ref 1–4.8)
ABSOLUTE MONO #: 0.7 K/UL (ref 0.2–0.8)
ALBUMIN SERPL-MCNC: 4.1 G/DL (ref 3.5–5.2)
ALBUMIN/GLOBULIN RATIO: ABNORMAL (ref 1–2.5)
ALP BLD-CCNC: 48 U/L (ref 35–104)
ALT SERPL-CCNC: 8 U/L (ref 5–33)
AMORPHOUS: NORMAL
ANION GAP SERPL CALCULATED.3IONS-SCNC: 19 MMOL/L (ref 9–17)
AST SERPL-CCNC: 18 U/L
BACTERIA: NORMAL
BASOPHILS # BLD: 0 % (ref 0–2)
BASOPHILS ABSOLUTE: 0 K/UL (ref 0–0.2)
BILIRUB SERPL-MCNC: 0.21 MG/DL (ref 0.3–1.2)
BILIRUBIN URINE: NEGATIVE
BUN BLDV-MCNC: 37 MG/DL (ref 8–23)
BUN/CREAT BLD: 17 (ref 9–20)
CALCIUM SERPL-MCNC: 6 MG/DL (ref 8.6–10.4)
CASTS UA: NORMAL /LPF
CHLORIDE BLD-SCNC: 94 MMOL/L (ref 98–107)
CO2: 29 MMOL/L (ref 20–31)
COLOR: YELLOW
COMMENT UA: ABNORMAL
CREAT SERPL-MCNC: 2.14 MG/DL (ref 0.5–0.9)
CRYSTALS, UA: NORMAL /HPF
D-DIMER QUANTITATIVE: 1.92 MG/L FEU
DIFFERENTIAL TYPE: ABNORMAL
EKG ATRIAL RATE: 73 BPM
EKG P AXIS: 77 DEGREES
EKG P-R INTERVAL: 220 MS
EKG Q-T INTERVAL: 448 MS
EKG QRS DURATION: 90 MS
EKG QTC CALCULATION (BAZETT): 493 MS
EKG R AXIS: -29 DEGREES
EKG T AXIS: 48 DEGREES
EKG VENTRICULAR RATE: 73 BPM
EOSINOPHILS RELATIVE PERCENT: 1 % (ref 1–4)
EPITHELIAL CELLS UA: NORMAL /HPF (ref 0–5)
FERRITIN: 17 UG/L (ref 13–150)
FOLATE: >20 NG/ML
GFR AFRICAN AMERICAN: 28 ML/MIN
GFR NON-AFRICAN AMERICAN: 23 ML/MIN
GFR SERPL CREATININE-BSD FRML MDRD: ABNORMAL ML/MIN/{1.73_M2}
GFR SERPL CREATININE-BSD FRML MDRD: ABNORMAL ML/MIN/{1.73_M2}
GLUCOSE BLD-MCNC: 103 MG/DL (ref 65–105)
GLUCOSE BLD-MCNC: 122 MG/DL (ref 65–105)
GLUCOSE BLD-MCNC: 122 MG/DL (ref 65–105)
GLUCOSE BLD-MCNC: 146 MG/DL (ref 70–99)
GLUCOSE URINE: NEGATIVE
HCT VFR BLD CALC: 20.3 % (ref 36–46)
HCT VFR BLD CALC: 21.6 % (ref 36–46)
HCT VFR BLD CALC: 23.2 % (ref 36–46)
HCT VFR BLD CALC: 23.4 % (ref 36–46)
HEMOGLOBIN: 6.5 G/DL (ref 12–16)
HEMOGLOBIN: 6.8 G/DL (ref 12–16)
HEMOGLOBIN: 7.3 G/DL (ref 12–16)
HEMOGLOBIN: 7.4 G/DL (ref 12–16)
IMMATURE GRANULOCYTES: ABNORMAL %
INR BLD: 1.1
IRON SATURATION: 10 % (ref 20–55)
IRON: 37 UG/DL (ref 37–145)
KETONES, URINE: NEGATIVE
LACTIC ACID, WHOLE BLOOD: NORMAL MMOL/L (ref 0.7–2.1)
LACTIC ACID: 0.9 MMOL/L (ref 0.5–2.2)
LACTIC ACID: 5.5 MMOL/L (ref 0.5–2.2)
LEUKOCYTE ESTERASE, URINE: NEGATIVE
LYMPHOCYTES # BLD: 13 % (ref 24–44)
MCH RBC QN AUTO: 29.4 PG (ref 26–34)
MCHC RBC AUTO-ENTMCNC: 31.3 G/DL (ref 31–37)
MCV RBC AUTO: 93.9 FL (ref 80–100)
MONOCYTES # BLD: 7 % (ref 1–7)
MUCUS: NORMAL
NITRITE, URINE: NEGATIVE
OTHER OBSERVATIONS UA: NORMAL
PARTIAL THROMBOPLASTIN TIME: 29.2 SEC (ref 23–31)
PDW BLD-RTO: 18.1 % (ref 11.5–14.5)
PH UA: 6.5 (ref 5–8)
PLATELET # BLD: 148 K/UL (ref 130–400)
PLATELET ESTIMATE: ABNORMAL
PMV BLD AUTO: 8.2 FL (ref 6–12)
POTASSIUM SERPL-SCNC: 5.3 MMOL/L (ref 3.7–5.3)
PROTEIN UA: NEGATIVE
PROTHROMBIN TIME: 11 SEC (ref 9.7–11.6)
RBC # BLD: 2.47 M/UL (ref 4–5.2)
RBC # BLD: ABNORMAL 10*6/UL
RBC UA: NORMAL /HPF (ref 0–2)
RENAL EPITHELIAL, UA: NORMAL /HPF
SEG NEUTROPHILS: 79 % (ref 36–66)
SEGMENTED NEUTROPHILS ABSOLUTE COUNT: 7.4 K/UL (ref 1.8–7.7)
SODIUM BLD-SCNC: 142 MMOL/L (ref 135–144)
SPECIFIC GRAVITY UA: 1.01 (ref 1–1.03)
TOTAL IRON BINDING CAPACITY: 364 UG/DL (ref 250–450)
TOTAL PROTEIN: 7 G/DL (ref 6.4–8.3)
TRICHOMONAS: NORMAL
TROPONIN INTERP: NORMAL
TROPONIN T: <0.03 NG/ML
TSH SERPL DL<=0.05 MIU/L-ACNC: 7.58 MIU/L (ref 0.3–5)
TURBIDITY: ABNORMAL
UNSATURATED IRON BINDING CAPACITY: 327 UG/DL (ref 112–347)
URINE HGB: ABNORMAL
UROBILINOGEN, URINE: NORMAL
VITAMIN B-12: 1381 PG/ML (ref 232–1245)
WBC # BLD: 9.5 K/UL (ref 3.5–11)
WBC # BLD: ABNORMAL 10*3/UL
WBC UA: NORMAL /HPF (ref 0–5)
YEAST: NORMAL

## 2017-12-16 PROCEDURE — 80053 COMPREHEN METABOLIC PANEL: CPT

## 2017-12-16 PROCEDURE — 36415 COLL VENOUS BLD VENIPUNCTURE: CPT

## 2017-12-16 PROCEDURE — 6370000000 HC RX 637 (ALT 250 FOR IP): Performed by: INTERNAL MEDICINE

## 2017-12-16 PROCEDURE — 71250 CT THORAX DX C-: CPT

## 2017-12-16 PROCEDURE — 71010 XR CHEST PORTABLE: CPT

## 2017-12-16 PROCEDURE — 86920 COMPATIBILITY TEST SPIN: CPT

## 2017-12-16 PROCEDURE — 85025 COMPLETE CBC W/AUTO DIFF WBC: CPT

## 2017-12-16 PROCEDURE — 51701 INSERT BLADDER CATHETER: CPT

## 2017-12-16 PROCEDURE — 83550 IRON BINDING TEST: CPT

## 2017-12-16 PROCEDURE — 72125 CT NECK SPINE W/O DYE: CPT

## 2017-12-16 PROCEDURE — 2580000003 HC RX 258: Performed by: EMERGENCY MEDICINE

## 2017-12-16 PROCEDURE — 2500000003 HC RX 250 WO HCPCS: Performed by: INTERNAL MEDICINE

## 2017-12-16 PROCEDURE — 86850 RBC ANTIBODY SCREEN: CPT

## 2017-12-16 PROCEDURE — 99223 1ST HOSP IP/OBS HIGH 75: CPT | Performed by: INTERNAL MEDICINE

## 2017-12-16 PROCEDURE — 85014 HEMATOCRIT: CPT

## 2017-12-16 PROCEDURE — 2060000000 HC ICU INTERMEDIATE R&B

## 2017-12-16 PROCEDURE — 84443 ASSAY THYROID STIM HORMONE: CPT

## 2017-12-16 PROCEDURE — 83540 ASSAY OF IRON: CPT

## 2017-12-16 PROCEDURE — 70450 CT HEAD/BRAIN W/O DYE: CPT

## 2017-12-16 PROCEDURE — 84484 ASSAY OF TROPONIN QUANT: CPT

## 2017-12-16 PROCEDURE — 85610 PROTHROMBIN TIME: CPT

## 2017-12-16 PROCEDURE — 2700000000 HC OXYGEN THERAPY PER DAY

## 2017-12-16 PROCEDURE — 6360000002 HC RX W HCPCS: Performed by: EMERGENCY MEDICINE

## 2017-12-16 PROCEDURE — 86901 BLOOD TYPING SEROLOGIC RH(D): CPT

## 2017-12-16 PROCEDURE — 87086 URINE CULTURE/COLONY COUNT: CPT

## 2017-12-16 PROCEDURE — 82746 ASSAY OF FOLIC ACID SERUM: CPT

## 2017-12-16 PROCEDURE — 85730 THROMBOPLASTIN TIME PARTIAL: CPT

## 2017-12-16 PROCEDURE — 82947 ASSAY GLUCOSE BLOOD QUANT: CPT

## 2017-12-16 PROCEDURE — 85018 HEMOGLOBIN: CPT

## 2017-12-16 PROCEDURE — 83605 ASSAY OF LACTIC ACID: CPT

## 2017-12-16 PROCEDURE — 94760 N-INVAS EAR/PLS OXIMETRY 1: CPT

## 2017-12-16 PROCEDURE — 99285 EMERGENCY DEPT VISIT HI MDM: CPT

## 2017-12-16 PROCEDURE — 93005 ELECTROCARDIOGRAM TRACING: CPT

## 2017-12-16 PROCEDURE — 36430 TRANSFUSION BLD/BLD COMPNT: CPT

## 2017-12-16 PROCEDURE — 86900 BLOOD TYPING SEROLOGIC ABO: CPT

## 2017-12-16 PROCEDURE — 85379 FIBRIN DEGRADATION QUANT: CPT

## 2017-12-16 PROCEDURE — 2580000003 HC RX 258: Performed by: INTERNAL MEDICINE

## 2017-12-16 PROCEDURE — 96375 TX/PRO/DX INJ NEW DRUG ADDON: CPT

## 2017-12-16 PROCEDURE — 73060 X-RAY EXAM OF HUMERUS: CPT

## 2017-12-16 PROCEDURE — P9016 RBC LEUKOCYTES REDUCED: HCPCS

## 2017-12-16 PROCEDURE — 81001 URINALYSIS AUTO W/SCOPE: CPT

## 2017-12-16 PROCEDURE — 82728 ASSAY OF FERRITIN: CPT

## 2017-12-16 PROCEDURE — 94640 AIRWAY INHALATION TREATMENT: CPT

## 2017-12-16 PROCEDURE — 73030 X-RAY EXAM OF SHOULDER: CPT

## 2017-12-16 PROCEDURE — 82607 VITAMIN B-12: CPT

## 2017-12-16 PROCEDURE — 70486 CT MAXILLOFACIAL W/O DYE: CPT

## 2017-12-16 PROCEDURE — 74176 CT ABD & PELVIS W/O CONTRAST: CPT

## 2017-12-16 PROCEDURE — 96374 THER/PROPH/DIAG INJ IV PUSH: CPT

## 2017-12-16 RX ORDER — DIAZEPAM 5 MG/1
5 TABLET ORAL ONCE
Status: COMPLETED | OUTPATIENT
Start: 2017-12-16 | End: 2017-12-18

## 2017-12-16 RX ORDER — BISACODYL 10 MG
10 SUPPOSITORY, RECTAL RECTAL DAILY PRN
Status: DISCONTINUED | OUTPATIENT
Start: 2017-12-16 | End: 2017-12-21 | Stop reason: HOSPADM

## 2017-12-16 RX ORDER — DEXTROSE MONOHYDRATE 50 MG/ML
100 INJECTION, SOLUTION INTRAVENOUS PRN
Status: DISCONTINUED | OUTPATIENT
Start: 2017-12-16 | End: 2017-12-21 | Stop reason: HOSPADM

## 2017-12-16 RX ORDER — ROPINIROLE 3 MG/1
3 TABLET, FILM COATED ORAL EVERY MORNING
COMMUNITY
End: 2018-01-22

## 2017-12-16 RX ORDER — DEXTROSE MONOHYDRATE 25 G/50ML
12.5 INJECTION, SOLUTION INTRAVENOUS PRN
Status: DISCONTINUED | OUTPATIENT
Start: 2017-12-16 | End: 2017-12-21 | Stop reason: HOSPADM

## 2017-12-16 RX ORDER — MAGNESIUM SULFATE 1 G/100ML
1 INJECTION INTRAVENOUS PRN
Status: DISCONTINUED | OUTPATIENT
Start: 2017-12-16 | End: 2017-12-16

## 2017-12-16 RX ORDER — RASAGILINE 0.5 MG/1
1 TABLET ORAL DAILY
Status: DISCONTINUED | OUTPATIENT
Start: 2017-12-16 | End: 2017-12-21 | Stop reason: HOSPADM

## 2017-12-16 RX ORDER — AMIODARONE HYDROCHLORIDE 200 MG/1
200 TABLET ORAL DAILY
Status: DISCONTINUED | OUTPATIENT
Start: 2017-12-16 | End: 2017-12-21 | Stop reason: HOSPADM

## 2017-12-16 RX ORDER — RASAGILINE 1 MG/1
1 TABLET ORAL DAILY
COMMUNITY

## 2017-12-16 RX ORDER — MORPHINE SULFATE 4 MG/ML
4 INJECTION, SOLUTION INTRAMUSCULAR; INTRAVENOUS
Status: DISCONTINUED | OUTPATIENT
Start: 2017-12-16 | End: 2017-12-21 | Stop reason: HOSPADM

## 2017-12-16 RX ORDER — ALBUTEROL SULFATE 2.5 MG/3ML
2.5 SOLUTION RESPIRATORY (INHALATION) EVERY 6 HOURS PRN
Status: DISCONTINUED | OUTPATIENT
Start: 2017-12-16 | End: 2017-12-21 | Stop reason: HOSPADM

## 2017-12-16 RX ORDER — SODIUM CHLORIDE 9 MG/ML
INJECTION, SOLUTION INTRAVENOUS CONTINUOUS
Status: DISCONTINUED | OUTPATIENT
Start: 2017-12-16 | End: 2017-12-18

## 2017-12-16 RX ORDER — CARBIDOPA AND LEVODOPA 25; 100 MG/1; MG/1
1 TABLET, EXTENDED RELEASE ORAL 4 TIMES DAILY
COMMUNITY
End: 2018-01-22

## 2017-12-16 RX ORDER — ACETAMINOPHEN 325 MG/1
650 TABLET ORAL EVERY 4 HOURS PRN
Status: DISCONTINUED | OUTPATIENT
Start: 2017-12-16 | End: 2017-12-21 | Stop reason: HOSPADM

## 2017-12-16 RX ORDER — CALCITRIOL 0.25 UG/1
0.25 CAPSULE, LIQUID FILLED ORAL 3 TIMES DAILY
Status: DISCONTINUED | OUTPATIENT
Start: 2017-12-16 | End: 2017-12-21 | Stop reason: HOSPADM

## 2017-12-16 RX ORDER — SODIUM CHLORIDE 0.9 % (FLUSH) 0.9 %
10 SYRINGE (ML) INJECTION EVERY 12 HOURS SCHEDULED
Status: DISCONTINUED | OUTPATIENT
Start: 2017-12-16 | End: 2017-12-21 | Stop reason: HOSPADM

## 2017-12-16 RX ORDER — MORPHINE SULFATE 4 MG/ML
4 INJECTION, SOLUTION INTRAMUSCULAR; INTRAVENOUS ONCE
Status: COMPLETED | OUTPATIENT
Start: 2017-12-16 | End: 2017-12-16

## 2017-12-16 RX ORDER — NICOTINE POLACRILEX 4 MG
15 LOZENGE BUCCAL PRN
Status: DISCONTINUED | OUTPATIENT
Start: 2017-12-16 | End: 2017-12-21 | Stop reason: HOSPADM

## 2017-12-16 RX ORDER — 0.9 % SODIUM CHLORIDE 0.9 %
250 INTRAVENOUS SOLUTION INTRAVENOUS ONCE
Status: DISCONTINUED | OUTPATIENT
Start: 2017-12-16 | End: 2017-12-21 | Stop reason: HOSPADM

## 2017-12-16 RX ORDER — HYDROCODONE BITARTRATE AND ACETAMINOPHEN 5; 325 MG/1; MG/1
1 TABLET ORAL EVERY 4 HOURS PRN
Status: DISCONTINUED | OUTPATIENT
Start: 2017-12-16 | End: 2017-12-21 | Stop reason: HOSPADM

## 2017-12-16 RX ORDER — SODIUM CHLORIDE 0.9 % (FLUSH) 0.9 %
10 SYRINGE (ML) INJECTION PRN
Status: DISCONTINUED | OUTPATIENT
Start: 2017-12-16 | End: 2017-12-21 | Stop reason: HOSPADM

## 2017-12-16 RX ORDER — FUROSEMIDE 40 MG/1
40 TABLET ORAL DAILY
COMMUNITY
End: 2018-01-22

## 2017-12-16 RX ORDER — CARBIDOPA AND LEVODOPA 25; 100 MG/1; MG/1
1 TABLET, EXTENDED RELEASE ORAL 4 TIMES DAILY
Status: DISCONTINUED | OUTPATIENT
Start: 2017-12-16 | End: 2017-12-21 | Stop reason: HOSPADM

## 2017-12-16 RX ORDER — ROPINIROLE 2 MG/1
2 TABLET, FILM COATED ORAL 2 TIMES DAILY
COMMUNITY
End: 2018-01-22

## 2017-12-16 RX ORDER — CALCIUM CARBONATE 500(1250)
500 TABLET ORAL 3 TIMES DAILY
Status: DISCONTINUED | OUTPATIENT
Start: 2017-12-16 | End: 2017-12-16

## 2017-12-16 RX ORDER — ONDANSETRON 2 MG/ML
4 INJECTION INTRAMUSCULAR; INTRAVENOUS EVERY 6 HOURS PRN
Status: DISCONTINUED | OUTPATIENT
Start: 2017-12-16 | End: 2017-12-21 | Stop reason: HOSPADM

## 2017-12-16 RX ORDER — ATORVASTATIN CALCIUM 20 MG/1
20 TABLET, FILM COATED ORAL DAILY
Status: DISCONTINUED | OUTPATIENT
Start: 2017-12-16 | End: 2017-12-21 | Stop reason: HOSPADM

## 2017-12-16 RX ORDER — NICOTINE 21 MG/24HR
1 PATCH, TRANSDERMAL 24 HOURS TRANSDERMAL DAILY PRN
Status: DISCONTINUED | OUTPATIENT
Start: 2017-12-16 | End: 2017-12-21 | Stop reason: HOSPADM

## 2017-12-16 RX ORDER — CALCIUM CARBONATE 200(500)MG
500 TABLET,CHEWABLE ORAL 3 TIMES DAILY
Status: DISCONTINUED | OUTPATIENT
Start: 2017-12-16 | End: 2017-12-21 | Stop reason: HOSPADM

## 2017-12-16 RX ORDER — MAGNESIUM OXIDE 400 MG/1
400 TABLET ORAL DAILY
Status: DISCONTINUED | OUTPATIENT
Start: 2017-12-16 | End: 2017-12-16 | Stop reason: SDUPTHER

## 2017-12-16 RX ORDER — 0.9 % SODIUM CHLORIDE 0.9 %
1000 INTRAVENOUS SOLUTION INTRAVENOUS ONCE
Status: COMPLETED | OUTPATIENT
Start: 2017-12-16 | End: 2017-12-16

## 2017-12-16 RX ORDER — PANTOPRAZOLE SODIUM 40 MG/1
40 TABLET, DELAYED RELEASE ORAL
Status: DISCONTINUED | OUTPATIENT
Start: 2017-12-16 | End: 2017-12-21 | Stop reason: HOSPADM

## 2017-12-16 RX ORDER — ROPINIROLE 2 MG/1
2 TABLET, FILM COATED ORAL
Status: DISCONTINUED | OUTPATIENT
Start: 2017-12-16 | End: 2017-12-21 | Stop reason: HOSPADM

## 2017-12-16 RX ORDER — LANOLIN ALCOHOL/MO/W.PET/CERES
1000 CREAM (GRAM) TOPICAL DAILY
Status: DISCONTINUED | OUTPATIENT
Start: 2017-12-16 | End: 2017-12-21 | Stop reason: HOSPADM

## 2017-12-16 RX ORDER — MORPHINE SULFATE 2 MG/ML
2 INJECTION, SOLUTION INTRAMUSCULAR; INTRAVENOUS
Status: DISCONTINUED | OUTPATIENT
Start: 2017-12-16 | End: 2017-12-21 | Stop reason: HOSPADM

## 2017-12-16 RX ORDER — FLUCONAZOLE 150 MG/1
150 TABLET ORAL ONCE
Status: COMPLETED | OUTPATIENT
Start: 2017-12-16 | End: 2017-12-16

## 2017-12-16 RX ORDER — ONDANSETRON 2 MG/ML
8 INJECTION INTRAMUSCULAR; INTRAVENOUS ONCE
Status: COMPLETED | OUTPATIENT
Start: 2017-12-16 | End: 2017-12-16

## 2017-12-16 RX ADMIN — MICONAZORB AF ANTIFUNGAL POWDER: 1.42 POWDER TOPICAL at 14:42

## 2017-12-16 RX ADMIN — CHOLECALCIFEROL CAP 125 MCG (5000 UNIT) 5000 UNITS: 125 CAP at 14:48

## 2017-12-16 RX ADMIN — FLUCONAZOLE 150 MG: 150 TABLET ORAL at 14:47

## 2017-12-16 RX ADMIN — ONDANSETRON 8 MG: 2 INJECTION INTRAMUSCULAR; INTRAVENOUS at 09:17

## 2017-12-16 RX ADMIN — CALCITRIOL 0.25 MCG: 0.25 CAPSULE, LIQUID FILLED ORAL at 21:39

## 2017-12-16 RX ADMIN — CARBIDOPA AND LEVODOPA 1 TABLET: 25; 100 TABLET, EXTENDED RELEASE ORAL at 21:39

## 2017-12-16 RX ADMIN — SODIUM CHLORIDE: 9 INJECTION, SOLUTION INTRAVENOUS at 14:40

## 2017-12-16 RX ADMIN — SODIUM CHLORIDE 1000 ML: 9 INJECTION, SOLUTION INTRAVENOUS at 11:30

## 2017-12-16 RX ADMIN — Medication 500 MG: at 14:33

## 2017-12-16 RX ADMIN — MORPHINE SULFATE 2 MG: 4 INJECTION, SOLUTION INTRAMUSCULAR; INTRAVENOUS at 09:18

## 2017-12-16 RX ADMIN — SODIUM CHLORIDE 1000 ML: 9 INJECTION, SOLUTION INTRAVENOUS at 10:18

## 2017-12-16 RX ADMIN — CALCITRIOL 0.25 MCG: 0.25 CAPSULE, LIQUID FILLED ORAL at 14:34

## 2017-12-16 RX ADMIN — Medication 500 MG: at 21:39

## 2017-12-16 RX ADMIN — MOMETASONE FUROATE AND FORMOTEROL FUMARATE DIHYDRATE 2 PUFF: 200; 5 AEROSOL RESPIRATORY (INHALATION) at 20:47

## 2017-12-16 RX ADMIN — CARBIDOPA AND LEVODOPA 1 TABLET: 25; 100 TABLET, EXTENDED RELEASE ORAL at 17:34

## 2017-12-16 RX ADMIN — AMIODARONE HYDROCHLORIDE 200 MG: 200 TABLET ORAL at 14:33

## 2017-12-16 RX ADMIN — PANTOPRAZOLE SODIUM 40 MG: 40 TABLET, DELAYED RELEASE ORAL at 17:34

## 2017-12-16 RX ADMIN — RASAGILINE MESYLATE 1 MG: 0.5 TABLET ORAL at 14:34

## 2017-12-16 RX ADMIN — ROPINIROLE HYDROCHLORIDE 2 MG: 2 TABLET, FILM COATED ORAL at 21:39

## 2017-12-16 RX ADMIN — METOPROLOL TARTRATE 25 MG: 25 TABLET ORAL at 14:39

## 2017-12-16 RX ADMIN — MAGNESIUM OXIDE TAB 400 MG (241.3 MG ELEMENTAL MG) 400 MG: 400 (241.3 MG) TAB at 14:47

## 2017-12-16 RX ADMIN — CYANOCOBALAMIN TAB 1000 MCG 1000 MCG: 1000 TAB at 14:39

## 2017-12-16 RX ADMIN — METOPROLOL TARTRATE 25 MG: 25 TABLET ORAL at 21:41

## 2017-12-16 RX ADMIN — ATORVASTATIN CALCIUM 20 MG: 20 TABLET, FILM COATED ORAL at 21:39

## 2017-12-16 RX ADMIN — ROPINIROLE HYDROCHLORIDE 2 MG: 2 TABLET, FILM COATED ORAL at 14:38

## 2017-12-16 RX ADMIN — MICONAZORB AF ANTIFUNGAL POWDER: 1.42 POWDER TOPICAL at 21:39

## 2017-12-16 ASSESSMENT — ENCOUNTER SYMPTOMS
RESPIRATORY NEGATIVE: 1
BLOOD IN STOOL: 0
TROUBLE SWALLOWING: 0
FACIAL SWELLING: 1
GASTROINTESTINAL NEGATIVE: 1

## 2017-12-16 ASSESSMENT — PAIN SCALES - GENERAL
PAINLEVEL_OUTOF10: 0
PAINLEVEL_OUTOF10: 0

## 2017-12-16 NOTE — H&P
[facility]    HISTORY AND PHYSICAL EXAMINATION            Date:   12/16/2017  Patient name:  Harsha Landa  Date of admission:  12/16/2017  8:10 AM  MRN:   [id number]  Account:  [de-identified]  YOB: 1945  PCP:    Zeenat Howell DO  Room:   1009/1009-02  Code Status:    Full Code    Chief Complaint:     Chief Complaint   Patient presents with    Loss of Consciousness       History Obtained From:     patient, family member - , electronic medical record, patient poor historian as has no recollection    History of Present Illness: The patient is a 81 y.o.   female who presents with Loss of Consciousness   and she is admitted to the hospital for the management of  Passing out. She states she was on the couch and not long after getting up, had sudden loc. No recollection of event. No preceding symptoms of dizziness, etc.  Denies all c/o except hard to keep eyes open. Denies cp/sob/n/v/f/c/s/s/cough. I spoke to  and he was making coffee in the other room at athe time. He heard a thud and ran into the other room-found her passed out on floor, unresponsive. She was \"shaking\"-he describes her body being rigid with her \"arms and legs moving back and forth. \"  She was unarousable for approx 15 min until she was in ambulance. He also states she has had balance issues last few days and several falls. Past Medical History:     Past Medical History:   Diagnosis Date    Acute on chronic diastolic congestive heart failure (HCC)     Asthma     Atrial fibrillation (HCC)     Cataracts, bilateral     Cerebral artery occlusion with cerebral infarction [facility])     Last stroke was February 2017 w/no deficits    Chronic kidney disease     Constipation     COPD (chronic obstructive [facility] disease) ([facility]     PT. SEES DR. BECK    Diverticulosis     DM2 (diabetes mellitus, type 2) ([facility]     Full dentures     GERD (gastroesophageal reflux disease)     Headache(784.0)     Hyperlipidemia     Hyperplastic polyp of intestine     Hypertension     PT. DOES NOT SEE A CARDIOLOGIST    Morbid obesity with BMI of 40.0-44.9, adult ([facility] 12/30/2016    ECTOR on CPAP     Osteoarthritis     Parkinson disease ([facility]     Spinal stenosis in cervical region 6/2/2013    Type II or unspecified type diabetes mellitus without mention of complication, not stated as uncontrolled     Unspecified sleep apnea     cpap nightly    Wears glasses         Past Surgical History:     Past Surgical History:   Procedure Laterality Date    ABDOMEN SURGERY      APPENDECTOMY      BACK SURGERY      CATARACT REMOVAL WITH IMPLANT Bilateral 2007   [id number] SURGERY  2012    CERVICAL SPINE SURGERY  5/31/13    posterior c5-t1    COLONOSCOPY  2009    COLONOSCOPY  12/29/2016    incomplete was not cleaned out    COLONOSCOPY  12/30/2016    COLONOSCOPY  04/25/2017     SIGMOID COLON POLYPECTOMY:  HYPERPLASTIC POLYP ,   DIVERTICULOSIS    EYE SURGERY Bilateral     CATARACTS W/ IOL    HERNIA REPAIR  1999    VENTRAL    LAMINECTOMY  5/31/13    Dr. Antonio No FLREZA W/REMOVAL LESION BY HOT BX FORCEPS N/A 4/25/2017    COLONOSCOPY POLYPECTOMY HOT BIOPSY performed by Brigido Vance MD at [facility] ENDOSCOPY  12/28/2016    gastritis, esophagitis,         Medications Prior to Admission:     Prior to Admission medications    Medication Sig Start Date End Date Taking?  Authorizing Provider   furosemide (LASIX) 40 MG tablet Take 40 mg by mouth daily   Yes Historical Provider, MD   vitamin D (CHOLECALCIFEROL) 5000 units CAPS capsule Take 5,000 Units by mouth daily   Yes Historical Provider, MD   rasagiline mesylate 1 MG TABS Take 1 tablet by mouth daily   Yes Historical Provider, MD   rOPINIRole (REQUIP) 2 MG tablet Take 2 mg by mouth 2 times daily   Yes Historical Provider, MD BlankenshipRole (REQUIP) 3 MG tablet Take 3 mg by mouth every morning   Yes Historical Provider, MD   carbidopa-levodopa (SINEMET CR)  MG per extended release tablet Take 1 tablet by mouth 4 times daily   Yes Historical Provider, MD   potassium chloride (MICRO-K) 10 MEQ extended release capsule Take 2 capsules by mouth 2 times daily 11/16/17  Yes Lesly Hill DO   calcitRIOL (ROCALTROL) 0.25 MCG capsule TAKE 1 CAPSULE THREE TIMES A DAY 11/9/17  Yes Lesly Hill DO   pantoprazole (PROTONIX) 40 MG tablet Take 1 tablet by mouth 2 times daily (before meals) 11/9/17  Yes Lesly Hill DO   metoprolol tartrate (LOPRESSOR) 25 MG tablet Take 1 tablet by mouth 2 times daily 11/9/17  Yes Lesly Hill DO   spironolactone (ALDACTONE) 25 MG tablet Take 1 tablet by mouth daily 11/9/17  Yes Lesly Hill DO   apixaban (ELIQUIS) 5 MG TABS tablet Take 1 tablet by mouth 2 times daily 11/9/17  Yes Lesly Hill DO   HYDROcodone-acetaminophen (NORCO) 5-325 MG per tablet Take 1 tablet by mouth 2 times daily as needed for Pain . 11/6/17  Yes Lesly Hill DO   atorvastatin (LIPITOR) 20 MG tablet TAKE 1 TABLET DAILY 10/23/17  Yes Lesly Hill DO   JANUMET  MG per tablet TAKE 1 TABLET TWICE A DAY WITH MEALS 9/1/17  Yes Lesly Hill DO   amiodarone (CORDARONE) 200 MG tablet Take 1 tablet by mouth daily 8/27/17  Yes Jose Perez, DO   tiotropium (SPIRIVA RESPIMAT) 2.5 MCG/ACT AERS inhaler Inhale 2 puffs into the lungs daily   Yes Historical Provider, MD   miconazole (MICOTIN) 2 % powder Apply topically 2 times daily. Patient taking differently: Apply topically 2 times daily.  8/20/17  Yes Lexus Marcos,    vitamin B-12 1000 MCG tablet Take 1 tablet by mouth daily 8/18/17  Yes Jose Perez, DO   albuterol (PROVENTIL) (2.5 MG/3ML) 0.083% nebulizer solution Take 3 mLs by nebulization every 6 hours as needed for Wheezing 7/18/17  Yes Lesly Hill, DO   magnesium oxide (MAG-OX) 400 MG tablet Take 400 mg by mouth daily   Yes hour(s))   EKG 12 lead    Collection Time: 12/16/17  8:19 AM   Result Value Ref Range    Ventricular Rate 73 BPM    Atrial Rate 73 BPM    P-R Interval 220 ms    QRS Duration 90 ms    Q-T Interval 448 ms    QTc Calculation (Bazett) 493 ms    P Axis 77 degrees    R Axis -29 degrees    T Axis 48 degrees   CBC auto differential    Collection Time: 12/16/17  8:47 AM   Result Value Ref Range    WBC 9.5 3.5 - 11.0 k/uL    RBC 2.47 (L) 4.0 - 5.2 m/uL    Hemoglobin 7.3 (L) 12.0 - 16.0 g/dL    Hematocrit 23.2 (L) 36 - 46 %    MCV 93.9 80 - 100 fL    MCH 29.4 26 - 34 pg    MCHC 31.3 31 - 37 g/dL    RDW 18.1 (H) 11.5 - 14.5 %    Platelets 969 336 - 285 k/uL    MPV 8.2 6.0 - 12.0 fL    Differential Type NOT REPORTED     Immature Granulocytes NOT REPORTED 0 %    Absolute Immature Granulocyte NOT REPORTED 0.00 - 0.30 k/uL    WBC Morphology NOT REPORTED     RBC Morphology NOT REPORTED     Platelet Estimate NOT REPORTED     Seg Neutrophils 79 (H) 36 - 66 %    Lymphocytes 13 (L) 24 - 44 %    Monocytes 7 1 - 7 %    Eosinophils % 1 1 - 4 %    Basophils 0 0 - 2 %    Segs Absolute 7.40 1.8 - 7.7 k/uL    Absolute Lymph # 1.20 1.0 - 4.8 k/uL    Absolute Mono # 0.70 0.2 - 0.8 k/uL    Absolute Eos # 0.10 0.0 - 0.4 k/uL    Basophils # 0.00 0.0 - 0.2 k/uL   Comprehensive metabolic panel    Collection Time: 12/16/17  8:47 AM   Result Value Ref Range    Glucose 146 (H) 70 - 99 mg/dL    BUN 37 (H) 8 - 23 mg/dL    CREATININE 2.14 (H) 0.50 - 0.90 mg/dL    Bun/Cre Ratio 17 9 - 20    Calcium 6.0 (L) 8.6 - 10.4 mg/dL    Sodium 142 135 - 144 mmol/L    Potassium 5.3 3.7 - 5.3 mmol/L    Chloride 94 (L) 98 - 107 mmol/L    CO2 29 20 - 31 mmol/L    Anion Gap 19 (H) 9 - 17 mmol/L    Alkaline Phosphatase 48 35 - 104 U/L    ALT 8 5 - 33 U/L    AST 18 <32 U/L    Total Bilirubin 0.21 (L) 0.3 - 1.2 mg/dL    Total Protein 7.0 6.4 - 8.3 g/dL    Alb 4.1 3.5 - 5.2 g/dL    Albumin/Globulin Ratio NOT REPORTED 1.0 - 2.5    GFR Non-African American 23 (L) >60 mL/min Hematocrit 20.3 (L) 36 - 46 %   Lactic Acid, Plasma    Collection Time: 12/16/17 12:00 PM   Result Value Ref Range    Lactic Acid 0.9 0.5 - 2.2 mmol/L    Lactic Acid, Whole Blood NOT REPORTED 0.7 - 2.1 mmol/L   POC Glucose Fingerstick    Collection Time: 12/16/17 12:51 PM   Result Value Ref Range    POC Glucose 103 65 - 105 mg/dL       Imaging/Diagnostics:    Ct chest/abd/pelvis:  Impression   1.  Suspected soft tissue contusion at the posterior lower right chest wall. No evidence of rib fracture.       2.  No findings to indicate acute traumatic injury in the chest, abdomen, or   pelvis.       3.  Distended gallbladder with suspected sludge.  No visible cholelithiasis   or pericholecystic inflammatory change.  If desired, this could be further   evaluated with ultrasound.       4.  Enlarging indeterminate lesion at the posterior inferior left kidney,   measuring 3.2 cm.  This is not compatible with a simple cyst based on CT   attenuation characteristics.  However, this was previously evaluated with   ultrasound on 11/11/2015 and reportedly corresponded to a cyst at that time,   suggesting a hemorrhagic cyst       Ct cervical spine:     Impression   No acute abnormality of the cervical spine. Ct head:     Impression   No acute intracranial abnormality.       Soft tissue lesion along the forehead right of midline may be related to   trauma or could represent a sebaceous cyst or other lesion. Ct facial bones:     Impression   1.  No acute maxillofacial fracture.       2.  Right frontal scalp lesion, possibly hematoma or sebaceous cyst         Left shoulder xray:     Impression   No acute osseous findings.          Assessment :      Primary Problem  KRISTIN (acute kidney injury) [facility])    Active Hospital Problems    Diagnosis Date Noted    KRISTIN (acute kidney injury) ([facility] [N17.9] 12/16/2017    Anemia [D64.9] 12/16/2017    Stage 3 chronic kidney disease [N18.3] 08/27/2017    Panlobular emphysema ([facility] [J43.1]

## 2017-12-16 NOTE — PLAN OF CARE
Problem: Falls - Risk of  Goal: Absence of falls  Outcome: Met This Shift  Pt remains free from falls/injury this shift, bed locked and in lowest position, call light and bedside table within reach, bed alarm activated, falling star posted outside of room, non-skid socks on, hourly rounding, will continue to monitor.

## 2017-12-16 NOTE — PROGRESS NOTES
Attempt made to complete MRI screening- pt refuses MRI. Perfect serve sent to Dr. Perez in order to notify of this.

## 2017-12-16 NOTE — ED PROVIDER NOTES
[facility] ED  eMERGENCY dEPARTMENT eNCOUnter      Pt Name: Larry Kemp  MRN: [id number]  [place] 1945  Date of evaluation: 12/16/2017  Provider: Herberth Valdivia MD    CHIEF COMPLAINT       Chief Complaint   Patient presents with    Loss of Consciousness         HISTORY OF PRESENT ILLNESS  (Location/Symptom, Timing/Onset, Context/Setting, Quality, Duration, Modifying Factors, Severity.)   Larry Kemp is a 81 y.o. female who presents to the emergency department Brought in by EMS for possible new onset seizure. Patient was with the  she was having breakfast she heard a thump he went to the extremities and found her lying on the floor with shaking activity of her upper and lower extremities. After that she was unarousable until EMS got there. Upon arrival to ED she was awake alert and oriented just complaining that she heard \"all over\". Notably the patient's covered with ecchymosis on her face especially the right periorbital region, the left proximal humerus. She also has excoriations and possibly bug bites to her face and both forearms. She also has intertrigous Candida on her abdomen. She has no flank ecchymosis. There is no abdominal tenderness. Notably the patient is an Eliquis for A. fib, also she has Parkinson's and history of an unsteady gait. On Monday of this week per the  the patient tripped over a table fell unknown if she hit her head but had no loss of consciousness at that time did not want to go to hospital over the course of the next couple days the has been noticed the ecchymosis to her face and her arm and patient refused to go to the hospital.  During this time she's had some difficulty walking but no headaches or confusion. Nursing Notes were reviewed. ALLERGIES     Pcn [penicillins];  Dye [barium-containing compounds]; and Red dye    CURRENT MEDICATIONS       Previous Medications    ALBUTEROL (PROVENTIL) (2.5 MG/3ML) 0.083% NEBULIZER SOLUTION Take 3 mLs by nebulization every 6 hours as needed for Wheezing    AMIODARONE (CORDARONE) 200 MG TABLET    Take 1 tablet by mouth daily    APIXABAN (ELIQUIS) 5 MG TABS TABLET    Take 1 tablet by mouth 2 times daily    ATORVASTATIN (LIPITOR) 20 MG TABLET    TAKE 1 TABLET DAILY    AZILECT 0.5 MG TABS    Take 1 mg by mouth daily     CALCITRIOL (ROCALTROL) 0.25 MCG CAPSULE    TAKE 1 CAPSULE THREE TIMES A DAY    CALCIUM ELEMENTAL (OSCAL) 500 MG TABS TABLET    Take 1,500 mg by mouth daily    CARBIDOPA-LEVODOPA (SINEMET)  MG PER TABLET    Take 1 tablet by mouth 4 times daily    FUROSEMIDE (LASIX) 20 MG TABLET    Take 1 tablet by mouth daily    HYDROCODONE-ACETAMINOPHEN (NORCO) 5-325 MG PER TABLET    Take 1 tablet by mouth 2 times daily as needed for Pain . JANUMET  MG PER TABLET    TAKE 1 TABLET TWICE A DAY WITH MEALS    MAGNESIUM OXIDE (MAG-OX) 400 MG TABLET    Take 400 mg by mouth daily    METOPROLOL TARTRATE (LOPRESSOR) 25 MG TABLET    Take 1 tablet by mouth 2 times daily    MICONAZOLE (MICOTIN) 2 % POWDER    Apply topically 2 times daily. MOMETASONE-FORMOTEROL (DULERA) 200-5 MCG/ACT INHALER    Inhale 2 puffs into the lungs 2 times daily    OXYGEN CONCENTRATOR    Dx: Pneumonia, use as directed.     PANTOPRAZOLE (PROTONIX) 40 MG TABLET    Take 1 tablet by mouth 2 times daily (before meals)    POTASSIUM CHLORIDE (KLOR-CON M) 20 MEQ EXTENDED RELEASE TABLET    Take 1 tablet by mouth daily    POTASSIUM CHLORIDE (MICRO-K) 10 MEQ EXTENDED RELEASE CAPSULE    Take 2 capsules by mouth 2 times daily    PROAIR  (90 BASE) MCG/ACT INHALER    Inhale 2 puffs into the lungs every 6 hours as needed for Shortness of Breath     ROPINIROLE (REQUIP) 3 MG TABLET    Take 1 tablet by mouth 3 times daily    SPIRONOLACTONE (ALDACTONE) 25 MG TABLET    Take 1 tablet by mouth daily    TIOTROPIUM (SPIRIVA RESPIMAT) 2.5 MCG/ACT AERS INHALER    Inhale 2 puffs into the lungs daily    VITAMIN B-12 1000 MCG TABLET    Take 1 tablet by mouth daily       PAST MEDICAL HISTORY         Diagnosis Date    Acute on chronic diastolic congestive heart failure (HCC)     Asthma     Atrial fibrillation (HCC)     Cataracts, bilateral     Cerebral artery occlusion with cerebral infarction [facility])     Last stroke was February 2017 w/no deficits    Chronic kidney disease     Constipation     COPD (chronic obstructive pulmonary disease) ([facility]     PT. SEES DR. BECK    Diverticulosis     DM2 (diabetes mellitus, type 2) ([facility]     Full dentures     GERD (gastroesophageal reflux disease)     Headache(784.0)     Hyperlipidemia     Hyperplastic polyp of intestine     Hypertension     PT.  DOES NOT SEE A CARDIOLOGIST    Morbid obesity with BMI of 40.0-44.9, adult ([facility] 12/30/2016    ECTOR on CPAP     Osteoarthritis     Parkinson disease ([facility]     Spinal stenosis in cervical region 6/2/2013    Type II or unspecified type diabetes mellitus without mention of complication, not stated as uncontrolled     Unspecified sleep apnea     cpap nightly    Wears glasses        SURGICAL HISTORY           Procedure Laterality Date    ABDOMEN SURGERY      APPENDECTOMY      BACK SURGERY      CATARACT REMOVAL WITH IMPLANT Bilateral 2007   [address] [facility] SURGERY  2012    CERVICAL SPINE SURGERY  5/31/13    posterior c5-t1    COLONOSCOPY  2009    COLONOSCOPY  12/29/2016    incomplete was not cleaned out    COLONOSCOPY  12/30/2016    COLONOSCOPY  04/25/2017     SIGMOID COLON POLYPECTOMY:  HYPERPLASTIC POLYP ,   DIVERTICULOSIS    EYE SURGERY Bilateral     CATARACTS W/ IOL    HERNIA REPAIR  1999    VENTRAL    LAMINECTOMY  5/31/13    Dr. Nicole Camarillo FLX W/REMOVAL LESION BY HOT BX FORCEPS N/A 4/25/2017    COLONOSCOPY POLYPECTOMY HOT BIOPSY performed by Halley Kemp MD at [facility] ENDOSCOPY  12/28/2016    gastritis, esophagitis,          FAMILY HISTORY           Problem Relation Age of Onset    Heart Failure Mother     Hypertension Mother     Heart Disease Mother     High Blood Pressure Mother     Asthma Other      Family Status   Relation Status    Mother     Father     Other         SOCIAL HISTORY      reports that she quit smoking about 37 years ago. Her smoking use included Cigarettes. She has a 30.00 pack-year smoking history. She has never used smokeless tobacco. She reports that she drinks about 1.2 oz of alcohol per week . She reports that she uses drugs, including Marijuana. REVIEW OF SYSTEMS    (2-9 systems for level 4, 10 or more for level 5)     Review of Systems   Constitutional: Negative. HENT: Positive for facial swelling. Negative for trouble swallowing. Respiratory: Negative. Cardiovascular: Negative. Gastrointestinal: Negative. Negative for blood in stool. Genitourinary: Negative. Musculoskeletal:        Patient complains of pain \"all over\"   Skin:        Record ecchymosis of the face, right. Periorbital  area, also to the left upper humerus   Neurological: Positive for seizures. Psychiatric/Behavioral: Negative. All other systems reviewed and are negative. Except as noted above the remainder of the review of systems was reviewed and negative. PHYSICAL EXAM    (up to 7 for level 4, 8 or more for level 5)     ED Triage Vitals [17 0819]   BP Temp Temp Source Pulse Resp SpO2 Height Weight   139/68 98.4 °F (36.9 °C) Oral 73 16 (!) 88 % 5' 1\" (1.549 m) 184 lb (83.5 kg)       Physical Exam   Constitutional: She is oriented to person, place, and time. She appears well-developed and well-nourished. Eyes: Conjunctivae are normal. Pupils are equal, round, and reactive to light. There is a large right periorbital contusion. Visual acuity is intact to reading my badge at about 12 inches. Neck: Normal range of motion. No tracheal deviation present. Cardiovascular: Normal rate and regular rhythm.     Pulmonary/Chest: Effort normal and breath sounds normal.   Abdominal: Soft. There is no tenderness. Musculoskeletal:   Patient has a large contusion on her left upper arm. All extremities are fairly range of motion and neurovascularly intact. Neurological: She is alert and oriented to person, place, and time. Patient does not remember falling this morning otherwise is oriented. Skin:   There is a large right periorbital hematoma, there is a large contusion and left upper extremity. There is no flank hematoma. There is excoriated scabbed lesions on her cheeks of both forearms possibly consistent with bug bites. She has a large area of intertrigouscandida in her lower abdomen. Nursing note and vitals reviewed. DIAGNOSTIC RESULTS     EKG: All EKG's are interpreted by the Emergency Department Physician who either signs or Co-signs this chart in the absence of a cardiologist.    Normal sinus rhythm ventricular rate of 66, there is first-degree AV block there are no acute T-wave abnormalities there is normal QT interval.    RADIOLOGY:   Non-plain film images such as CT, Ultrasound and MRI are read by the radiologist. [facility] radiographic images are visualized and preliminarily interpreted by the emergency physician with the below findings:      Xr Humerus Left (min 2 Views)    Result Date: 12/16/2017  EXAMINATION: 3 VIEWS OF THE LEFT SHOULDER; AP AND LATERAL VIEWS OF THE LEFT HUMERUS 12/16/2017 9:13 am COMPARISON: 12/16/2017 HISTORY: ORDERING SYSTEM PROVIDED HISTORY: PAIN TECHNOLOGIST PROVIDED HISTORY: Reason for exam:->PAIN Ordering Physician Provided Reason for Exam: fall x 1 week Acuity: Unknown Type of Exam: Unknown FINDINGS: No fracture or dislocation is identified. Severe osteoarthritis of the acromioclavicular joint and moderate osteoarthritis of the glenohumeral joint. Glenohumeral joint space is not well visualized due to x-ray projection. No soft tissue calcifications. No acute osseous findings.      Ct process. XR SHOULDER LEFT (MIN 2 VIEWS)   Final Result   No acute osseous findings. XR HUMERUS LEFT (MIN 2 VIEWS)   Final Result   No acute osseous findings. CT FACIAL BONES WO CONTRAST   Preliminary Result   1. No acute maxillofacial fracture. 2.  Right frontal scalp lesion,   possibly hematoma or sebaceous cyst.         CT CERVICAL SPINE WO CONTRAST   Preliminary Result   No acute abnormality of the cervical spine. CT Head WO Contrast   Preliminary Result   No acute intracranial abnormality. Soft tissue lesion along the forehead right of midline may be related to   trauma or could represent a sebaceous cyst or other lesion.          CT CHEST WO CONTRAST    (Results Pending)   CT ABDOMEN PELVIS WO IV CONTRAST    (Results Pending)         ED BEDSIDE ULTRASOUND:   Performed by ED Physician - none    LABS:  Labs Reviewed   CBC WITH AUTO DIFFERENTIAL - Abnormal; Notable for the following:        Result Value    RBC 2.47 (*)     Hemoglobin 7.3 (*)     Hematocrit 23.2 (*)     RDW 18.1 (*)     Seg Neutrophils 79 (*)     Lymphocytes 13 (*)     All other components within normal limits   COMPREHENSIVE METABOLIC PANEL - Abnormal; Notable for the following:     Glucose 146 (*)     BUN 37 (*)     CREATININE 2.14 (*)     Chloride 94 (*)     Anion Gap 19 (*)     Total Bilirubin 0.21 (*)     GFR Non- 23 (*)     GFR  28 (*)     All other components within normal limits   URINALYSIS - Abnormal; Notable for the following:     Turbidity UA CLOUDY (*)     Urine Hgb 3+ (*)     All other components within normal limits   LACTIC ACID - Abnormal; Notable for the following:     Lactic Acid 5.5 (*)     All other components within normal limits   TSH WITHOUT REFLEX - Abnormal; Notable for the following:     TSH 7.58 (*)     All other components within normal limits   URINE CULTURE   APTT   TROPONIN   PROTIME-INR   D-DIMER, QUANTITATIVE   MICROSCOPIC URINALYSIS   HEMOGLOBIN AND HEMATOCRIT, BLOOD   HEMOGLOBIN AND HEMATOCRIT, BLOOD   HEMOGLOBIN AND HEMATOCRIT, BLOOD   LACTIC ACID, PLASMA       All other labs were within normal range or not returned as of this dictation. EMERGENCY DEPARTMENT COURSE and DIFFERENTIAL DIAGNOSIS/MDM:   Vitals:    Vitals:    12/16/17 0819   BP: 139/68   Pulse: 73   Resp: 16   Temp: 98.4 °F (36.9 °C)   TempSrc: Oral   SpO2: (!) 88%   Weight: 184 lb (83.5 kg)   Height: 5' 1\" (1.549 m)     Problem list:  #1 likely new onset seizure. Will have seizure precautions and neurovascular checks, we will need EEG and neuro consult, no antiepileptics given in the ED  #2 lactic acidosis, likely secondary to #1, will give 2 L IV fluid and repeat lactate. Patient's last known EF of 60%. #3 acute anemia with hemoglobin 7.3. We'll monitor serial H&H, will get CT abdomen and pelvis to rule out  retroperitoneal bleed. Blood has been sent for type and screen. #4 head trauma on Eliquis. Initial CT brain is negative for any acute bleed. Will continue to monitor her neurologic status. #5 acute kidney injury with double creatinine from previous. We'll give  IV fluids and repeat labs. #6 fall onEliquis. Patient needs reevaluation of whether she should be on blood thinners as she has unsteady gait due to parkinsonism and history of falls. Also apparently since she has been on amiodarone there has not been documented A. fib since then. Cardiology consult. #7 hypothyroidism TSH greater than 7. Check thyroid panel and change medications as necessary. Case discussed with internal medicine will be admitted to internal medicine patient's admission to admission. CONSULTS:  IP CONSULT TO INTERNAL MEDICINE    PROCEDURES:  Procedures    FINAL IMPRESSION      1. Syncope and collapse    2. Seizure after head injury ([address]    3. Acute kidney injury ([address]    4. Anemia, unspecified type    5.  Lactic acidosis          DISPOSITION/PLAN   DISPOSITION Decision to Admit    PATIENT

## 2017-12-16 NOTE — ED NOTES
Pt is on home O2 2 L  Uses a walker to ambulate.  reports the multiple scabbed areas on her arm are from pt picking at her skin.      Chika Tinajero RN  12/16/17 [address], RN  12/16/17 [id number]

## 2017-12-16 NOTE — FLOWSHEET NOTE
Pt had a critical hemoglobin of 6.5 this afternoon, Dr. Perez placed order to transfuse 1 unit of blood. Another hemoglobin was drawn and lab called another critical level of 6.8, the 1 unit of blood was just received from blood bank prior to being called this critical value and not yet started. Dr. Perez notified of this second critical value and that the blood was just received and going to be administered, will re-check the pt's level 1 hour after the transfusion has stopped. Will continue to monitor.

## 2017-12-17 ENCOUNTER — APPOINTMENT (OUTPATIENT)
Dept: GENERAL RADIOLOGY | Age: 72
DRG: 682 | End: 2017-12-17
Payer: COMMERCIAL

## 2017-12-17 PROBLEM — D50.0 IRON DEFICIENCY ANEMIA DUE TO CHRONIC BLOOD LOSS: Status: ACTIVE | Noted: 2017-12-17

## 2017-12-17 PROBLEM — D64.9 ANEMIA: Status: RESOLVED | Noted: 2017-12-16 | Resolved: 2017-12-17

## 2017-12-17 PROBLEM — J96.22 ACUTE ON CHRONIC RESPIRATORY FAILURE WITH HYPOXIA AND HYPERCAPNIA (HCC): Status: ACTIVE | Noted: 2017-12-17

## 2017-12-17 PROBLEM — J96.21 ACUTE ON CHRONIC RESPIRATORY FAILURE WITH HYPOXIA AND HYPERCAPNIA (HCC): Status: ACTIVE | Noted: 2017-12-17

## 2017-12-17 LAB
ABO/RH: NORMAL
ALBUMIN SERPL-MCNC: 3.7 G/DL (ref 3.5–5.2)
ALBUMIN/GLOBULIN RATIO: ABNORMAL (ref 1–2.5)
ALP BLD-CCNC: 49 U/L (ref 35–104)
ALT SERPL-CCNC: <5 U/L (ref 5–33)
ANION GAP SERPL CALCULATED.3IONS-SCNC: 10 MMOL/L (ref 9–17)
ANTIBODY SCREEN: NEGATIVE
ARM BAND NUMBER: NORMAL
AST SERPL-CCNC: 18 U/L
BILIRUB SERPL-MCNC: 0.25 MG/DL (ref 0.3–1.2)
BLD PROD TYP BPU: NORMAL
BUN BLDV-MCNC: 28 MG/DL (ref 8–23)
BUN/CREAT BLD: 16 (ref 9–20)
CALCIUM IONIZED: 0.79 MMOL/L (ref 1.13–1.33)
CALCIUM SERPL-MCNC: 5.9 MG/DL (ref 8.6–10.4)
CHLORIDE BLD-SCNC: 99 MMOL/L (ref 98–107)
CO2: 33 MMOL/L (ref 20–31)
CREAT SERPL-MCNC: 1.78 MG/DL (ref 0.5–0.9)
CROSSMATCH RESULT: NORMAL
CULTURE: NO GROWTH
CULTURE: NORMAL
DISPENSE STATUS BLOOD BANK: NORMAL
EXPIRATION DATE: NORMAL
GFR AFRICAN AMERICAN: 34 ML/MIN
GFR NON-AFRICAN AMERICAN: 28 ML/MIN
GFR SERPL CREATININE-BSD FRML MDRD: ABNORMAL ML/MIN/{1.73_M2}
GFR SERPL CREATININE-BSD FRML MDRD: ABNORMAL ML/MIN/{1.73_M2}
GLUCOSE BLD-MCNC: 107 MG/DL (ref 65–105)
GLUCOSE BLD-MCNC: 109 MG/DL (ref 65–105)
GLUCOSE BLD-MCNC: 117 MG/DL (ref 70–99)
GLUCOSE BLD-MCNC: 118 MG/DL (ref 65–105)
GLUCOSE BLD-MCNC: 132 MG/DL (ref 65–105)
GLUCOSE BLD-MCNC: 166 MG/DL (ref 65–105)
HCT VFR BLD CALC: 25.1 % (ref 36–46)
HEMOGLOBIN: 8.1 G/DL (ref 12–16)
Lab: NORMAL
MAGNESIUM: 2 MG/DL (ref 1.6–2.6)
MCH RBC QN AUTO: 30.1 PG (ref 26–34)
MCHC RBC AUTO-ENTMCNC: 32.2 G/DL (ref 31–37)
MCV RBC AUTO: 93.5 FL (ref 80–100)
PDW BLD-RTO: 17 % (ref 11.5–14.5)
PHOSPHORUS: 6.3 MG/DL (ref 2.6–4.5)
PLATELET # BLD: 136 K/UL (ref 130–400)
PMV BLD AUTO: ABNORMAL FL (ref 6–12)
POTASSIUM SERPL-SCNC: 5.3 MMOL/L (ref 3.7–5.3)
RBC # BLD: 2.68 M/UL (ref 4–5.2)
SODIUM BLD-SCNC: 142 MMOL/L (ref 135–144)
SPECIMEN DESCRIPTION: NORMAL
SPECIMEN DESCRIPTION: NORMAL
STATUS: NORMAL
TOTAL PROTEIN: 6.5 G/DL (ref 6.4–8.3)
TRANSFUSION STATUS: NORMAL
UNIT DIVISION: 0
UNIT NUMBER: NORMAL
WBC # BLD: 9.1 K/UL (ref 3.5–11)

## 2017-12-17 PROCEDURE — 99232 SBSQ HOSP IP/OBS MODERATE 35: CPT | Performed by: INTERNAL MEDICINE

## 2017-12-17 PROCEDURE — 6360000002 HC RX W HCPCS: Performed by: NURSE PRACTITIONER

## 2017-12-17 PROCEDURE — 83735 ASSAY OF MAGNESIUM: CPT

## 2017-12-17 PROCEDURE — 84100 ASSAY OF PHOSPHORUS: CPT

## 2017-12-17 PROCEDURE — 71010 XR CHEST PORTABLE: CPT

## 2017-12-17 PROCEDURE — 82947 ASSAY GLUCOSE BLOOD QUANT: CPT

## 2017-12-17 PROCEDURE — 85027 COMPLETE CBC AUTOMATED: CPT

## 2017-12-17 PROCEDURE — 6370000000 HC RX 637 (ALT 250 FOR IP): Performed by: INTERNAL MEDICINE

## 2017-12-17 PROCEDURE — 2580000003 HC RX 258: Performed by: INTERNAL MEDICINE

## 2017-12-17 PROCEDURE — 80053 COMPREHEN METABOLIC PANEL: CPT

## 2017-12-17 PROCEDURE — 2580000003 HC RX 258: Performed by: NURSE PRACTITIONER

## 2017-12-17 PROCEDURE — 2060000000 HC ICU INTERMEDIATE R&B

## 2017-12-17 PROCEDURE — 95816 EEG AWAKE AND DROWSY: CPT

## 2017-12-17 PROCEDURE — 36415 COLL VENOUS BLD VENIPUNCTURE: CPT

## 2017-12-17 PROCEDURE — 82330 ASSAY OF CALCIUM: CPT

## 2017-12-17 RX ORDER — ASCORBIC ACID 500 MG
500 TABLET ORAL 2 TIMES DAILY
Status: DISCONTINUED | OUTPATIENT
Start: 2017-12-17 | End: 2017-12-21 | Stop reason: HOSPADM

## 2017-12-17 RX ORDER — LANOLIN ALCOHOL/MO/W.PET/CERES
325 CREAM (GRAM) TOPICAL 2 TIMES DAILY WITH MEALS
Status: DISCONTINUED | OUTPATIENT
Start: 2017-12-17 | End: 2017-12-21 | Stop reason: HOSPADM

## 2017-12-17 RX ADMIN — Medication 500 MG: at 21:13

## 2017-12-17 RX ADMIN — OXYCODONE HYDROCHLORIDE AND ACETAMINOPHEN 500 MG: 500 TABLET ORAL at 10:32

## 2017-12-17 RX ADMIN — CHOLECALCIFEROL CAP 125 MCG (5000 UNIT) 5000 UNITS: 125 CAP at 08:21

## 2017-12-17 RX ADMIN — CYANOCOBALAMIN TAB 1000 MCG 1000 MCG: 1000 TAB at 08:22

## 2017-12-17 RX ADMIN — MAGNESIUM OXIDE TAB 400 MG (241.3 MG ELEMENTAL MG) 400 MG: 400 (241.3 MG) TAB at 10:32

## 2017-12-17 RX ADMIN — HYDROCODONE BITARTRATE AND ACETAMINOPHEN 1 TABLET: 5; 325 TABLET ORAL at 05:20

## 2017-12-17 RX ADMIN — CARBIDOPA AND LEVODOPA 1 TABLET: 25; 100 TABLET, EXTENDED RELEASE ORAL at 08:22

## 2017-12-17 RX ADMIN — Medication 500 MG: at 08:22

## 2017-12-17 RX ADMIN — Medication 500 MG: at 16:40

## 2017-12-17 RX ADMIN — CALCITRIOL 0.25 MCG: 0.25 CAPSULE, LIQUID FILLED ORAL at 16:40

## 2017-12-17 RX ADMIN — HYDROCODONE BITARTRATE AND ACETAMINOPHEN 1 TABLET: 5; 325 TABLET ORAL at 21:21

## 2017-12-17 RX ADMIN — ATORVASTATIN CALCIUM 20 MG: 20 TABLET, FILM COATED ORAL at 21:13

## 2017-12-17 RX ADMIN — CARBIDOPA AND LEVODOPA 1 TABLET: 25; 100 TABLET, EXTENDED RELEASE ORAL at 12:31

## 2017-12-17 RX ADMIN — PANTOPRAZOLE SODIUM 40 MG: 40 TABLET, DELAYED RELEASE ORAL at 05:11

## 2017-12-17 RX ADMIN — METOPROLOL TARTRATE 25 MG: 25 TABLET ORAL at 08:21

## 2017-12-17 RX ADMIN — OXYCODONE HYDROCHLORIDE AND ACETAMINOPHEN 500 MG: 500 TABLET ORAL at 21:13

## 2017-12-17 RX ADMIN — SODIUM CHLORIDE: 9 INJECTION, SOLUTION INTRAVENOUS at 05:11

## 2017-12-17 RX ADMIN — MICONAZORB AF ANTIFUNGAL POWDER: 1.42 POWDER TOPICAL at 08:20

## 2017-12-17 RX ADMIN — MICONAZORB AF ANTIFUNGAL POWDER: 1.42 POWDER TOPICAL at 21:13

## 2017-12-17 RX ADMIN — AMIODARONE HYDROCHLORIDE 200 MG: 200 TABLET ORAL at 08:22

## 2017-12-17 RX ADMIN — FERROUS SULFATE TAB EC 325 MG (65 MG FE EQUIVALENT) 325 MG: 325 (65 FE) TABLET DELAYED RESPONSE at 10:32

## 2017-12-17 RX ADMIN — ROPINIROLE HYDROCHLORIDE 2 MG: 2 TABLET, FILM COATED ORAL at 21:13

## 2017-12-17 RX ADMIN — FERROUS SULFATE TAB EC 325 MG (65 MG FE EQUIVALENT) 325 MG: 325 (65 FE) TABLET DELAYED RESPONSE at 16:46

## 2017-12-17 RX ADMIN — INSULIN LISPRO 1 UNITS: 100 INJECTION, SOLUTION INTRAVENOUS; SUBCUTANEOUS at 12:31

## 2017-12-17 RX ADMIN — CARBIDOPA AND LEVODOPA 1 TABLET: 25; 100 TABLET, EXTENDED RELEASE ORAL at 16:40

## 2017-12-17 RX ADMIN — ROPINIROLE HYDROCHLORIDE 2 MG: 2 TABLET, FILM COATED ORAL at 16:40

## 2017-12-17 RX ADMIN — CALCITRIOL 0.25 MCG: 0.25 CAPSULE, LIQUID FILLED ORAL at 08:21

## 2017-12-17 RX ADMIN — ROPINIROLE HYDROCHLORIDE 3 MG: 2 TABLET, FILM COATED ORAL at 08:21

## 2017-12-17 RX ADMIN — CALCIUM GLUCONATE 2 G: 94 INJECTION, SOLUTION INTRAVENOUS at 08:21

## 2017-12-17 RX ADMIN — RASAGILINE MESYLATE 1 MG: 0.5 TABLET ORAL at 08:21

## 2017-12-17 RX ADMIN — HYDROCODONE BITARTRATE AND ACETAMINOPHEN 1 TABLET: 5; 325 TABLET ORAL at 12:35

## 2017-12-17 RX ADMIN — METOPROLOL TARTRATE 25 MG: 25 TABLET ORAL at 21:13

## 2017-12-17 RX ADMIN — CARBIDOPA AND LEVODOPA 1 TABLET: 25; 100 TABLET, EXTENDED RELEASE ORAL at 21:13

## 2017-12-17 RX ADMIN — PANTOPRAZOLE SODIUM 40 MG: 40 TABLET, DELAYED RELEASE ORAL at 16:40

## 2017-12-17 RX ADMIN — CALCITRIOL 0.25 MCG: 0.25 CAPSULE, LIQUID FILLED ORAL at 21:13

## 2017-12-17 ASSESSMENT — PAIN SCALES - GENERAL
PAINLEVEL_OUTOF10: 0
PAINLEVEL_OUTOF10: 8
PAINLEVEL_OUTOF10: 9
PAINLEVEL_OUTOF10: 0
PAINLEVEL_OUTOF10: 8

## 2017-12-17 ASSESSMENT — PAIN DESCRIPTION - DESCRIPTORS
DESCRIPTORS: ACHING;DISCOMFORT
DESCRIPTORS: ACHING;DISCOMFORT

## 2017-12-17 ASSESSMENT — PAIN DESCRIPTION - FREQUENCY: FREQUENCY: CONTINUOUS

## 2017-12-17 ASSESSMENT — PAIN DESCRIPTION - LOCATION
LOCATION: GENERALIZED
LOCATION: GENERALIZED

## 2017-12-17 ASSESSMENT — PAIN DESCRIPTION - PAIN TYPE
TYPE: ACUTE PAIN
TYPE: ACUTE PAIN

## 2017-12-17 NOTE — PROGRESS NOTES
Nutrition Assessment    Type and Reason for Visit: Initial, Positive Nutrition Screen, Consult (PU/wounds, difficulty chewing or swallowing)    Nutrition Recommendations: 1. Suggest adjusting to 1,800 kcal carb controlled, low potassium, general consistency diet. 2. Consider ordering  Ensure Clear ONS, x 2/day. 3. Monitor for any signs or symptoms of possible aspiration. 4. If suspect has dysphagia, change to NPO status, and orderr a Bedside Swallow Evaluation with SLP. Malnutrition Assessment:  · Malnutrition Status: At risk for malnutrition  · Findings of the 6 clinical characteristics of malnutrition (Minimum of 2 out of 6 clinical characteristics is required to make the diagnosis of moderate or severe Protein Calorie Malnutrition based on AND/ASPEN Guidelines):  1. Energy Intake-Not available, not able to assess    2. Weight Loss-No significant weight loss  3. Fat Loss-Unable to assess  4. Muscle Loss-Unable to assess  5. Fluid Accumulation-Mild fluid accumulation, Extremities  6.  Strength-Not measured    Nutrition Diagnosis:   · Problem: Swallowing difficulty  · Etiology: related to Cognitive or neurological impairment     Signs and symptoms:  as evidenced by Patient report of:  +Difficulty chewing or swallowing @ times, Localized or generalized fluid accumulation    Nutrition Assessment:  · Subjective Assessment: Pt sleeping, not able to wake her up, has +facial bruising. Spoke with Family Member:  +intermittent swallowing problems r/t h/o Parkinson's ds, but is able to tolerate a general consistency diet. Wt has been stable since 11/16 (83.5 kg).   · Nutrition-Focused Physical Findings: GI:  WDL; PV:  +RLE/LLE, trace, +periorbital, non-pitting; Skin:  +bruising, scattered, +abrasion, facial/BUE, +elevated bump, forehead (R)  · Wound Type: Skin Tears  · Current Nutrition Therapies:  · Oral Diet Orders: Carb Control 4 Carbs/Meal   · Oral Diet intake: Unable to assess  · Anthropometric

## 2017-12-17 NOTE — PROGRESS NOTES
[facility]    Progress Note    12/17/2017    8:40 AM    Name:   Jyothi Hernandez  MRN:     [id number]     Acct:      [de-identified]   Room:   [address] [place] [address] Day:  1  Admit Date:  12/16/2017  8:10 AM    PCP:   [address], DO  Code Status:  Full Code    Subjective:     C/C:   Chief Complaint   Patient presents with    Loss of Consciousness     Interval History Status: improved. Feels good  Denies c/o  No further episodes    Brief History:     The patient is a 81 y.o.   female who presents with Loss of Consciousness   and she is admitted to the hospital for the management of  Passing out. She states she was on the couch and not long after getting up, had sudden loc. No recollection of event. No preceding symptoms of dizziness, etc.     I spoke to  and he was making coffee in the other room at the time. He heard a thud and ran into the other room-found her passed out on floor, unresponsive. She was \"shaking\"-he describes her body being rigid with her \"arms and legs moving back and forth. \"  She was unarousable for approx 15 min until she was in ambulance. He also states she has had balance issues last few days and several falls    She was found to be quite anemic and received transfusion    Has had extensive gi workup in past-egd 12/28/16-minimal changes (gastritis)  Colonoscopy x 2 dec 2016 (severe tics), repeat with polypectomy 4/2017    Review of Systems:     Constitutional:  negative for chills, fevers, sweats  Respiratory:  negative for cough, dyspnea on exertion, shortness of breath, wheezing  Cardiovascular:  negative for chest pain, chest pressure/discomfort, lower extremity edema, palpitations  Gastrointestinal:  negative for abdominal pain, constipation, diarrhea, nausea, vomiting  Neurological:  negative for dizziness, headache    Medications: Allergies:     Allergies   Allergen Reactions    Dye [Barium-Containing Compounds] Other (See Comments)     Cause Afib per     Pcn [Penicillins] Itching and Swelling    Red Dye Itching and Rash     This allergy is likely incorrect:  Per patient's  she has no issue with medications that have red dye in them or red food. He thinks this reaction was added to chart when the pt had a colonoscopy (possibly barium or iodine dye instead)       Current Meds:   Scheduled Meds:    calcium gluconate IVPB  2 g Intravenous Once    amiodarone  200 mg Oral Daily    atorvastatin  20 mg Oral Daily    rasagiline  1 mg Oral Daily    calcitRIOL  0.25 mcg Oral TID    metoprolol tartrate  25 mg Oral BID    miconazole   Topical BID    mometasone-formoterol  2 puff Inhalation BID    pantoprazole  40 mg Oral BID AC    rOPINIRole  2 mg Oral 2 times per day    cyanocobalamin  1,000 mcg Oral Daily    sodium chloride flush  10 mL Intravenous 2 times per day    insulin lispro  0-6 Units Subcutaneous TID WC    insulin lispro  0-3 Units Subcutaneous Nightly    rOPINIRole  3 mg Oral QAM    carbidopa-levodopa  1 tablet Oral 4x Daily    tiotropium  18 mcg Inhalation Daily    calcium carbonate  500 mg Oral TID    sodium chloride  250 mL Intravenous Once    vitamin D  5,000 Units Oral Daily    magnesium oxide  400 mg Oral Daily    diazepam  5 mg Oral Once     Continuous Infusions:    sodium chloride 75 mL/hr at 12/17/17 0511    dextrose       PRN Meds: albuterol, sodium chloride flush, acetaminophen, HYDROcodone 5 mg - acetaminophen, morphine **OR** morphine, magnesium hydroxide, bisacodyl, ondansetron, nicotine, glucose, dextrose, glucagon (rDNA), dextrose    Data:     Past Medical History:   has a past medical history of Acute on chronic diastolic congestive heart failure ([address] Asthma; Atrial fibrillation ([address] Cataracts, bilateral; Cerebral artery occlusion with cerebral infarction ([address] Chronic kidney disease;  Constipation; COPD (chronic obstructive pulmonary disease) ([address] --    --   17.0*   PLT  148   --    --    --   136   MPV  8.2   --    --    --   NOT REPORTED   INR  1.1   --    --    --    --    DDIMER  1.92   --    --    --    --     < > = values in this interval not displayed.      Chemistry:  Recent Labs      12/16/17   0847  12/16/17   1200  12/17/17   0515   NA  142   --   142   K  5.3   --   5.3   CL  94*   --   99   CO2  29   --   33*   GLUCOSE  146*   --   117*   BUN  37*   --   28*   CREATININE  2.14*   --   1.78*   MG   --    --   2.0   ANIONGAP  19*   --   10   LABGLOM  23*   --   28*   GFRAA  28*   --   34*   CALCIUM  6.0*   --   5.9*   PHOS   --    --   6.3*   TROPONINT  <0.03   --    --    LACTACIDWB   --   NOT REPORTED   --      Recent Labs      12/16/17   0847  12/16/17   1251  12/16/17   1656  12/16/17   2025  12/17/17   0515  12/17/17   0827   PROT  7.0   --    --    --   6.5   --    LABALBU  4.1   --    --    --   3.7   --    TSH  7.58*   --    --    --    --    --    AST  18   --    --    --   18   --    ALT  8   --    --    --   <5*   --    ALKPHOS  48   --    --    --   49   --    BILITOT  0.21*   --    --    --   0.25*   --    POCGLU   --   103  122*  122*   --   132*         Lab Results   Component Value Date/Time    SPECIAL NOT REPORTED 12/16/2017 08:55 AM     Lab Results   Component Value Date/Time    CULTURE NO GROWTH 12/16/2017 08:55 AM    CULTURE  12/16/2017 08:55 AM     Performed at [facility], [address] [place] [address] [phone]       Lab Results   Component Value Date    POCPH 7.36 06/02/2013    POCPCO2 55 06/02/2013    POCPO2 63 06/02/2013    POCHCO3 31.0 06/02/2013    NBEA NOT REPORTED 06/02/2013    PBEA 6 06/02/2013    JJW6TBO 33 06/02/2013    KNWM0QUG 90 06/02/2013    FIO2 45.0 01/15/2017       Radiology:    Nothing new      Physical Examination:        General appearance:  alert, cooperative and no distress  Mental Status:  oriented to person, place and time and normal affect  Lungs:  clear to auscultation bilaterally, normal

## 2017-12-17 NOTE — CONSULTS
Initial Neurology Consultation Note         2017   8:18 AM    Patient info: Sarath Kennedy is a 81 y.o. female  Account No.:  [de-identified]  Billing Acct. No:  [id number]   MRN:   [id number]  :   1945    Room:  [facility]9/1009-02   Day: 1    Date of Admission:  2017  8:10 AM  PCP: Shanda Castanon DO     History of Present Illness: Sarath Kennedy is a 81 y.o. White female who presented with history of multiple falls with bilateral leg edema possibly contributing. This is a 81year-old woman who has history of previous strokes whom we have seen previously. PMH:   Past Medical History:   Diagnosis Date    Acute on chronic diastolic congestive heart failure (HCC)     Asthma     Atrial fibrillation (HCC)     Cataracts, bilateral     Cerebral artery occlusion with cerebral infarction [facility])     Last stroke was 2017 w/no deficits    Chronic kidney disease     Constipation     COPD (chronic obstructive pulmonary disease) ([facility]     PT. SEES DR. BECK    Diverticulosis     DM2 (diabetes mellitus, type 2) ([facility]     Full dentures     GERD (gastroesophageal reflux disease)     Headache(784.0)     Hyperlipidemia     Hyperplastic polyp of intestine     Hypertension     PT.  DOES NOT SEE A CARDIOLOGIST    Morbid obesity with BMI of 40.0-44.9, adult ([facility] 2016    ECTOR on CPAP     Osteoarthritis     Parkinson disease ([facility]     Spinal stenosis in cervical region 2013    Type II or unspecified type diabetes mellitus without mention of complication, not stated as uncontrolled     Unspecified sleep apnea     cpap nightly    Wears glasses        PSH:   Past Surgical History:   Procedure Laterality Date    ABDOMEN SURGERY      APPENDECTOMY      BACK SURGERY      CATARACT REMOVAL WITH IMPLANT Bilateral 2007    CERVICAL [address] SURGERY  2012    CERVICAL SPINE SURGERY  13    posterior c5-t1    COLONOSCOPY      COLONOSCOPY  2016    incomplete was not 1/week \"at dinner\"    Drug use: Yes     Types: Marijuana      Comment: once in the past 3 months - edible - no smkg d/t COPD/Asthma    Sexual activity: No      Comment: not for the past year d/t illness, denies  concerns/pain     Other Topics Concern    Not on file     Social History Narrative    No narrative on file       Family History:   Family History   Problem Relation Age of Onset    Heart Failure Mother     Hypertension Mother     Heart Disease Mother     High Blood Pressure Mother     Asthma Other        Review of Systems: ROS ROS may not be entirely valid from patient. Physical Exam:    Admission Weight: Weight: 184 lb (83.5 kg)  Current Vital Signs: Blood pressure 112/77, pulse 65, temperature 97.5 °F (36.4 °C), temperature source Oral, resp. rate 20, height 5' 1\" (1.549 m), weight 184 lb (83.5 kg), SpO2 97 %, not currently breastfeeding. Vital signs in last 24 hours:  [unfilled]    Intake/Output last 3 shifts:  I/O last 3 completed shifts: In: 1332.5 [P.O.:240; I.V.:1092.5]  Out: 1375 [LJHCV:3457]    Intake/Output this shift:  No intake/output data recorded. Physical Exam:  /77   Pulse 65   Temp 97.5 °F (36.4 °C) (Oral)   Resp 20   Ht 5' 1\" (1.549 m)   Wt 184 lb (83.5 kg)   SpO2 97%   BMI 34.77 kg/m²     General Appearance:    Alert, cooperative, no distress, appears stated age, multiple bruises on face and elsewhere.    Head:    Normocephalic, without obvious abnormality, atraumatic   Eyes:    PERRL, conjunctiva/corneas clear, EOM's intact, fundi     benign, both eyes        Ears:    Normal TM's and external ear canals, both ears   Nose:   Nares normal, mucosa normal, no drainage   Throat:   Lips, mucosa, and tongue normal; teeth and gums normal   Neck:   Supple, symmetrical, trachea midline, no adenopathy;        thyroid:  No enlargement/tenderness/nodules; no carotid    bruit or JVD     Back:     Symmetric, no curvature, ROM normal, no CVA tenderness     Lungs: Clear to auscultation bilaterally, respirations unlabored   Chest wall:    No tenderness or deformity   Heart:    Regular rate and rhythm, S1 and S2 normal, no murmur, rub   or gallop   Abdomen:     Soft, non-tender, bowel sounds active all four quadrants,     no masses, no organomegaly   Genitalia:    Deferred   Rectal:    Deferred   Extremities:   Extremities normal, atraumatic, no cyanosis or edema   Pulses:   2+ and symmetric all extremities   Skin:   Skin color, texture, turgor normal, no rashes or lesions   Lymph nodes:   Cervical, supraclavicular, and axillary nodes normal     Neurologic:       CNII-XII intact.  Normal strength, sensation and reflexes       throughout     Principal Problem:    KRISTIN (acute kidney injury) ([facility]  Active Problems:    Controlled type 2 diabetes mellitus with stage 3 chronic kidney disease, without long-term current use of insulin ([facility])    Essential hypertension    Gastroesophageal reflux disease without esophagitis    ECTOR on CPAP    Parkinson disease ([facility]    Panlobular emphysema (HCC)    Stage 3 chronic kidney disease    Anemia       LOS: 1 day     Code Status:  Full Code    Current Meds:    calcium gluconate IVPB  2 g Intravenous Once    amiodarone  200 mg Oral Daily    atorvastatin  20 mg Oral Daily    rasagiline  1 mg Oral Daily    calcitRIOL  0.25 mcg Oral TID    metoprolol tartrate  25 mg Oral BID    miconazole   Topical BID    mometasone-formoterol  2 puff Inhalation BID    pantoprazole  40 mg Oral BID AC    rOPINIRole  2 mg Oral 2 times per day    cyanocobalamin  1,000 mcg Oral Daily    sodium chloride flush  10 mL Intravenous 2 times per day    insulin lispro  0-6 Units Subcutaneous TID WC    insulin lispro  0-3 Units Subcutaneous Nightly    rOPINIRole  3 mg Oral QAM    carbidopa-levodopa  1 tablet Oral 4x Daily    tiotropium  18 mcg Inhalation Daily    calcium carbonate  500 mg Oral TID    sodium chloride  250 mL Intravenous Once    vitamin D  5,000 Units Oral Daily    magnesium oxide  400 mg Oral Daily    diazepam  5 mg Oral Once     albuterol, sodium chloride flush, acetaminophen, HYDROcodone 5 mg - acetaminophen, morphine **OR** morphine, magnesium hydroxide, bisacodyl, ondansetron, nicotine, glucose, dextrose, glucagon (rDNA), dextrose    Lab Results   Component Value Date     12/17/2017    K 5.3 12/17/2017    CL 99 12/17/2017    CO2 33 (H) 12/17/2017    BUN 28 (H) 12/17/2017    CREATININE 1.78 (H) 12/17/2017    GLUCOSE 117 (H) 12/17/2017    CALCIUM 5.9 (LL) 12/17/2017    PROT 6.5 12/17/2017    LABALBU 3.7 12/17/2017    BILITOT 0.25 (L) 12/17/2017    ALKPHOS 49 12/17/2017    AST 18 12/17/2017    ALT <5 (L) 12/17/2017    LABGLOM 28 (L) 12/17/2017    GFRAA 34 (L) 12/17/2017    GLOB NOT REPORTED 02/18/2017       Ct Abdomen Pelvis Wo Iv Contrast    Result Date: 12/16/2017  EXAMINATION: CT OF THE CHEST WITHOUT CONTRAST; CT OF THE ABDOMEN AND PELVIS WITHOUT CONTRAST 12/16/2017 9:58 am TECHNIQUE: CT of the chest was performed without the administration of intravenous contrast. Multiplanar reformatted images are provided for review. Dose modulation, iterative reconstruction, and/or weight based adjustment of the mA/kV was utilized to reduce the radiation dose to as low as reasonably achievable.; CT of the abdomen and pelvis was performed without the administration of intravenous contrast. Multiplanar reformatted images are provided for review. Dose modulation, iterative reconstruction, and/or weight based adjustment of the mA/kV was utilized to reduce the radiation dose to as low as reasonably achievable. COMPARISON: CT chest dated 06/23/2017. CT abdomen and pelvis dated 11/09/2015 HISTORY: ORDERING SYSTEM PROVIDED HISTORY: CHEST WALL PAIN; ORDERING SYSTEM PROVIDED HISTORY: ABDOMINAL PAIN Fall today. FINDINGS: Chest: Mediastinum: The heart is normal in size. There is no pericardial effusion. Thoracic aorta and pulmonary arteries are normal in caliber. to indicate acute traumatic injury in the chest, abdomen, or pelvis. 3.  Distended gallbladder with suspected sludge. No visible cholelithiasis or pericholecystic inflammatory change. If desired, this could be further evaluated with ultrasound. 4.  Enlarging indeterminate lesion at the posterior inferior left kidney, measuring 3.2 cm. This is not compatible with a simple cyst based on CT attenuation characteristics. However, this was previously evaluated with ultrasound on 11/11/2015 and reportedly corresponded to a cyst at that time, suggesting a hemorrhagic cyst. 5.  Additional incidental findings described above. Xr Humerus Left (min 2 Views)    Result Date: 12/16/2017  EXAMINATION: 3 VIEWS OF THE LEFT SHOULDER; AP AND LATERAL VIEWS OF THE LEFT HUMERUS 12/16/2017 9:13 am COMPARISON: 12/16/2017 HISTORY: ORDERING SYSTEM PROVIDED HISTORY: PAIN TECHNOLOGIST PROVIDED HISTORY: Reason for exam:->PAIN Ordering Physician Provided Reason for Exam: fall x 1 week Acuity: Unknown Type of Exam: Unknown FINDINGS: No fracture or dislocation is identified. Severe osteoarthritis of the acromioclavicular joint and moderate osteoarthritis of the glenohumeral joint. Glenohumeral joint space is not well visualized due to x-ray projection. No soft tissue calcifications. No acute osseous findings. Ct Head Wo Contrast    Result Date: 12/16/2017  EXAMINATION: CT OF THE HEAD WITHOUT CONTRAST  12/16/2017 8:45 am TECHNIQUE: CT of the head was performed without the administration of intravenous contrast. Dose modulation, iterative reconstruction, and/or weight based adjustment of the mA/kV was utilized to reduce the radiation dose to as low as reasonably achievable. COMPARISON: December 27, 2016 HISTORY: ORDERING SYSTEM PROVIDED HISTORY: PAIN, MAX-FACIAL Acute / initial encounter FINDINGS: BRAIN/VENTRICLES: No acute intracranial hemorrhage. No mass effect. No midline shift.   Multiple areas of calcification are again seen with dense calcification of the basal ganglia. Mild prominence of the ventricles and sulci is consistent with atrophy. Moderate periventricular and subcortical white matter hypodensities are nonspecific but likely represent microvascular disease. ORBITS: The visualized portion of the orbits demonstrate no acute abnormality. SINUSES: Mastoid air cells are clear. Polyp or mucous retention cyst is seen within the right maxillary sinus. SOFT TISSUES/SKULL:  No acute skull fracture. Soft tissue lesion is seen along the forehead just right of midline. This appears encapsulated and could represent a hematoma versus a sebaceous cyst or other lesion. No acute intracranial abnormality. Soft tissue lesion along the forehead right of midline may be related to trauma or could represent a sebaceous cyst or other lesion. Ct Facial Bones Wo Contrast    Result Date: 12/16/2017  EXAMINATION: CT OF THE FACE WITHOUT CONTRAST  12/16/2017 8:45 am TECHNIQUE: CT of the face was performed without the administration of intravenous contrast. Multiplanar reformatted images are provided for review. Dose modulation, iterative reconstruction, and/or weight based adjustment of the mA/kV was utilized to reduce the radiation dose to as low as reasonably achievable. COMPARISON: None HISTORY: ORDERING SYSTEM PROVIDED HISTORY: PAIN, MAX-FACIAL FINDINGS: FACIAL BONES:  The maxilla, pterygoid plates and zygomatic arches are intact. The mandible is intact. The mandibular condyles are normally situated. The nasal bones and maxillary nasal processes are intact. ORBITS:  The globes appear intact. The extraocular muscles, optic nerve sheath complexes and lacrimal glands appear unremarkable. No retrobulbar hematoma or mass is seen. The orbital walls and rims are intact. SINUSES/MASTOIDS:  There is polypoid mucosal thickening in the right maxillary sinus. Minimal mucosal thickening is also noted in the inferior left maxillary sinus.   Paranasal sinuses are otherwise aerated. Mastoid air cells are aerated. No acute fracture is seen. SOFT TISSUES:  There is a focal soft tissue lesion in the right frontal scalp. Soft tissues are otherwise within normal limits. 1.  No acute maxillofacial fracture. 2.  Right frontal scalp lesion, possibly hematoma or sebaceous cyst.     Ct Chest Wo Contrast    Result Date: 12/16/2017  EXAMINATION: CT OF THE CHEST WITHOUT CONTRAST; CT OF THE ABDOMEN AND PELVIS WITHOUT CONTRAST 12/16/2017 9:58 am TECHNIQUE: CT of the chest was performed without the administration of intravenous contrast. Multiplanar reformatted images are provided for review. Dose modulation, iterative reconstruction, and/or weight based adjustment of the mA/kV was utilized to reduce the radiation dose to as low as reasonably achievable.; CT of the abdomen and pelvis was performed without the administration of intravenous contrast. Multiplanar reformatted images are provided for review. Dose modulation, iterative reconstruction, and/or weight based adjustment of the mA/kV was utilized to reduce the radiation dose to as low as reasonably achievable. COMPARISON: CT chest dated 06/23/2017. CT abdomen and pelvis dated 11/09/2015 HISTORY: ORDERING SYSTEM PROVIDED HISTORY: CHEST WALL PAIN; ORDERING SYSTEM PROVIDED HISTORY: ABDOMINAL PAIN Fall today. FINDINGS: Chest: Mediastinum: The heart is normal in size. There is no pericardial effusion. Thoracic aorta and pulmonary arteries are normal in caliber. There is extensive coronary artery calcification. No mediastinal, bulky hilar, or axillary lymphadenopathy. Lungs/pleura: There is mild linear bibasilar atelectasis and/or scarring. No focal airspace consolidation, pneumothorax, or pleural effusion. Soft Tissues/Bones: There is no acute or suspicious osseous abnormality. There is mild focal haziness of the subcutaneous fat in the lower posterior right chest, likely related to contusion.   No evidence of a rib with ultrasound on 11/11/2015 and reportedly corresponded to a cyst at that time, suggesting a hemorrhagic cyst. 5.  Additional incidental findings described above. Ct Cervical Spine Wo Contrast    Result Date: 12/16/2017  EXAMINATION: CT OF THE CERVICAL SPINE WITHOUT CONTRAST 12/16/2017 8:45 am TECHNIQUE: CT of the cervical spine was performed without the administration of intravenous contrast. Multiplanar reformatted images are provided for review. Dose modulation, iterative reconstruction, and/or weight based adjustment of the mA/kV was utilized to reduce the radiation dose to as low as reasonably achievable. COMPARISON: December 27, 2016 HISTORY: ORDERING SYSTEM PROVIDED HISTORY: NECK PAIN, CERVICAL SPINE Acute/initial encounter. FINDINGS: BONES/ALIGNMENT: Prior C5-C6 anterior discectomy and fusion. Posterior decompression is seen at C5-C7. No acute fracture. No subluxation. DEGENERATIVE CHANGES: Multilevel degenerative changes are seen. SOFT TISSUES: No prevertebral soft tissue swelling. Carotid artery calcifications are seen. No acute abnormality of the cervical spine. Xr Shoulder Left (min 2 Views)    Result Date: 12/16/2017  EXAMINATION: 3 VIEWS OF THE LEFT SHOULDER; AP AND LATERAL VIEWS OF THE LEFT HUMERUS 12/16/2017 9:13 am COMPARISON: 12/16/2017 HISTORY: ORDERING SYSTEM PROVIDED HISTORY: PAIN TECHNOLOGIST PROVIDED HISTORY: Reason for exam:->PAIN Ordering Physician Provided Reason for Exam: fall x 1 week Acuity: Unknown Type of Exam: Unknown FINDINGS: No fracture or dislocation is identified. Severe osteoarthritis of the acromioclavicular joint and moderate osteoarthritis of the glenohumeral joint. Glenohumeral joint space is not well visualized due to x-ray projection. No soft tissue calcifications. No acute osseous findings.      Xr Chest Portable    Result Date: 12/16/2017  EXAMINATION: SINGLE VIEW OF THE CHEST 12/16/2017 9:12 am COMPARISON: 09/03/2017 HISTORY: ORDERING SYSTEM

## 2017-12-17 NOTE — PLAN OF CARE
Problem: Falls - Risk of  Goal: Absence of falls  Outcome: Ongoing  Siderails up x 2  Hourly rounding. Call light in reach. Instructed to call for assist before attempting out of bed. Remains free from falls and accidental injury at this time. Floor free from obstacles, and bed is locked and in lowest position. Adequate lighting provided. Bed alarm on     Problem: ACTIVITY INTOLERANCE/IMPAIRED MOBILITY  Goal: Mobility/activity is maintained at optimum level for patient  Outcome: Ongoing      Problem:  Activity:  Goal: Fatigue will decrease  Fatigue will decrease  Outcome: Ongoing    Goal: Ability to tolerate increased activity will improve  Ability to tolerate increased activity will improve  Outcome: Ongoing    Goal: Ability to maintain optimal joint mobility will improve  Ability to maintain optimal joint mobility will improve  Outcome: Ongoing      Problem: Cardiac:  Goal: Ability to maintain an adequate cardiac output will improve  Ability to maintain an adequate cardiac output will improve  Outcome: Ongoing    Goal: Ability to maintain adequate ventilation will improve  Ability to maintain adequate ventilation will improve  Outcome: Ongoing    Goal: Ability to achieve and maintain adequate cardiopulmonary perfusion will improve  Ability to achieve and maintain adequate cardiopulmonary perfusion will improve  Outcome: Ongoing

## 2017-12-18 ENCOUNTER — APPOINTMENT (OUTPATIENT)
Dept: MRI IMAGING | Age: 72
DRG: 682 | End: 2017-12-18
Payer: COMMERCIAL

## 2017-12-18 ENCOUNTER — APPOINTMENT (OUTPATIENT)
Dept: CT IMAGING | Age: 72
DRG: 682 | End: 2017-12-18
Payer: COMMERCIAL

## 2017-12-18 ENCOUNTER — APPOINTMENT (OUTPATIENT)
Dept: ULTRASOUND IMAGING | Age: 72
DRG: 682 | End: 2017-12-18
Payer: COMMERCIAL

## 2017-12-18 LAB
ANION GAP SERPL CALCULATED.3IONS-SCNC: 14 MMOL/L (ref 9–17)
BUN BLDV-MCNC: 21 MG/DL (ref 8–23)
BUN/CREAT BLD: 13 (ref 9–20)
CALCIUM SERPL-MCNC: 6.5 MG/DL (ref 8.6–10.4)
CHLORIDE BLD-SCNC: 98 MMOL/L (ref 98–107)
CO2: 31 MMOL/L (ref 20–31)
CREAT SERPL-MCNC: 1.58 MG/DL (ref 0.5–0.9)
GFR AFRICAN AMERICAN: 39 ML/MIN
GFR NON-AFRICAN AMERICAN: 32 ML/MIN
GFR SERPL CREATININE-BSD FRML MDRD: ABNORMAL ML/MIN/{1.73_M2}
GFR SERPL CREATININE-BSD FRML MDRD: ABNORMAL ML/MIN/{1.73_M2}
GLUCOSE BLD-MCNC: 107 MG/DL (ref 65–105)
GLUCOSE BLD-MCNC: 113 MG/DL (ref 70–99)
GLUCOSE BLD-MCNC: 123 MG/DL (ref 65–105)
GLUCOSE BLD-MCNC: 130 MG/DL (ref 65–105)
GLUCOSE BLD-MCNC: 133 MG/DL (ref 65–105)
GLUCOSE BLD-MCNC: 97 MG/DL (ref 65–105)
HCT VFR BLD CALC: 23.5 % (ref 36–46)
HEMOGLOBIN: 7.4 G/DL (ref 12–16)
MCH RBC QN AUTO: 29.8 PG (ref 26–34)
MCHC RBC AUTO-ENTMCNC: 31.6 G/DL (ref 31–37)
MCV RBC AUTO: 94.3 FL (ref 80–100)
PDW BLD-RTO: 17.2 % (ref 11.5–14.5)
PLATELET # BLD: 129 K/UL (ref 130–400)
PMV BLD AUTO: ABNORMAL FL (ref 6–12)
POTASSIUM SERPL-SCNC: 4.4 MMOL/L (ref 3.7–5.3)
RBC # BLD: 2.5 M/UL (ref 4–5.2)
SODIUM BLD-SCNC: 143 MMOL/L (ref 135–144)
WBC # BLD: 9.5 K/UL (ref 3.5–11)

## 2017-12-18 PROCEDURE — 97162 PT EVAL MOD COMPLEX 30 MIN: CPT

## 2017-12-18 PROCEDURE — 97530 THERAPEUTIC ACTIVITIES: CPT

## 2017-12-18 PROCEDURE — 80048 BASIC METABOLIC PNL TOTAL CA: CPT

## 2017-12-18 PROCEDURE — 99232 SBSQ HOSP IP/OBS MODERATE 35: CPT | Performed by: INTERNAL MEDICINE

## 2017-12-18 PROCEDURE — 94640 AIRWAY INHALATION TREATMENT: CPT

## 2017-12-18 PROCEDURE — 97116 GAIT TRAINING THERAPY: CPT

## 2017-12-18 PROCEDURE — 94660 CPAP INITIATION&MGMT: CPT

## 2017-12-18 PROCEDURE — 97110 THERAPEUTIC EXERCISES: CPT

## 2017-12-18 PROCEDURE — 94761 N-INVAS EAR/PLS OXIMETRY MLT: CPT

## 2017-12-18 PROCEDURE — 76775 US EXAM ABDO BACK WALL LIM: CPT

## 2017-12-18 PROCEDURE — 2580000003 HC RX 258: Performed by: INTERNAL MEDICINE

## 2017-12-18 PROCEDURE — 2060000000 HC ICU INTERMEDIATE R&B

## 2017-12-18 PROCEDURE — 85027 COMPLETE CBC AUTOMATED: CPT

## 2017-12-18 PROCEDURE — 6370000000 HC RX 637 (ALT 250 FOR IP): Performed by: INTERNAL MEDICINE

## 2017-12-18 PROCEDURE — 82947 ASSAY GLUCOSE BLOOD QUANT: CPT

## 2017-12-18 PROCEDURE — 70450 CT HEAD/BRAIN W/O DYE: CPT

## 2017-12-18 PROCEDURE — 36415 COLL VENOUS BLD VENIPUNCTURE: CPT

## 2017-12-18 RX ORDER — FUROSEMIDE 40 MG/1
40 TABLET ORAL DAILY
Status: DISCONTINUED | OUTPATIENT
Start: 2017-12-18 | End: 2017-12-21 | Stop reason: HOSPADM

## 2017-12-18 RX ADMIN — METOPROLOL TARTRATE 25 MG: 25 TABLET ORAL at 21:50

## 2017-12-18 RX ADMIN — MICONAZORB AF ANTIFUNGAL POWDER: 1.42 POWDER TOPICAL at 09:26

## 2017-12-18 RX ADMIN — CARBIDOPA AND LEVODOPA 1 TABLET: 25; 100 TABLET, EXTENDED RELEASE ORAL at 09:50

## 2017-12-18 RX ADMIN — DIAZEPAM 5 MG: 5 TABLET ORAL at 08:32

## 2017-12-18 RX ADMIN — MICONAZORB AF ANTIFUNGAL POWDER: 1.42 POWDER TOPICAL at 21:50

## 2017-12-18 RX ADMIN — ROPINIROLE HYDROCHLORIDE 2 MG: 2 TABLET, FILM COATED ORAL at 21:50

## 2017-12-18 RX ADMIN — RASAGILINE MESYLATE 1 MG: 0.5 TABLET ORAL at 09:50

## 2017-12-18 RX ADMIN — Medication 500 MG: at 21:49

## 2017-12-18 RX ADMIN — OXYCODONE HYDROCHLORIDE AND ACETAMINOPHEN 500 MG: 500 TABLET ORAL at 22:04

## 2017-12-18 RX ADMIN — CALCITRIOL 0.25 MCG: 0.25 CAPSULE, LIQUID FILLED ORAL at 14:16

## 2017-12-18 RX ADMIN — CARBIDOPA AND LEVODOPA 1 TABLET: 25; 100 TABLET, EXTENDED RELEASE ORAL at 17:37

## 2017-12-18 RX ADMIN — CHOLECALCIFEROL CAP 125 MCG (5000 UNIT) 5000 UNITS: 125 CAP at 09:50

## 2017-12-18 RX ADMIN — SODIUM CHLORIDE: 9 INJECTION, SOLUTION INTRAVENOUS at 03:05

## 2017-12-18 RX ADMIN — CYANOCOBALAMIN TAB 1000 MCG 1000 MCG: 1000 TAB at 09:50

## 2017-12-18 RX ADMIN — AMIODARONE HYDROCHLORIDE 200 MG: 200 TABLET ORAL at 09:50

## 2017-12-18 RX ADMIN — FERROUS SULFATE TAB EC 325 MG (65 MG FE EQUIVALENT) 325 MG: 325 (65 FE) TABLET DELAYED RESPONSE at 17:37

## 2017-12-18 RX ADMIN — ROPINIROLE HYDROCHLORIDE 2 MG: 2 TABLET, FILM COATED ORAL at 14:16

## 2017-12-18 RX ADMIN — CARBIDOPA AND LEVODOPA 1 TABLET: 25; 100 TABLET, EXTENDED RELEASE ORAL at 21:50

## 2017-12-18 RX ADMIN — PANTOPRAZOLE SODIUM 40 MG: 40 TABLET, DELAYED RELEASE ORAL at 17:37

## 2017-12-18 RX ADMIN — MAGNESIUM OXIDE TAB 400 MG (241.3 MG ELEMENTAL MG) 400 MG: 400 (241.3 MG) TAB at 09:50

## 2017-12-18 RX ADMIN — CARBIDOPA AND LEVODOPA 1 TABLET: 25; 100 TABLET, EXTENDED RELEASE ORAL at 14:16

## 2017-12-18 RX ADMIN — Medication 10 ML: at 21:50

## 2017-12-18 RX ADMIN — METOPROLOL TARTRATE 25 MG: 25 TABLET ORAL at 09:26

## 2017-12-18 RX ADMIN — ATORVASTATIN CALCIUM 20 MG: 20 TABLET, FILM COATED ORAL at 22:05

## 2017-12-18 RX ADMIN — OXYCODONE HYDROCHLORIDE AND ACETAMINOPHEN 500 MG: 500 TABLET ORAL at 09:50

## 2017-12-18 RX ADMIN — FUROSEMIDE 40 MG: 40 TABLET ORAL at 09:26

## 2017-12-18 RX ADMIN — CALCITRIOL 0.25 MCG: 0.25 CAPSULE, LIQUID FILLED ORAL at 09:51

## 2017-12-18 RX ADMIN — FERROUS SULFATE TAB EC 325 MG (65 MG FE EQUIVALENT) 325 MG: 325 (65 FE) TABLET DELAYED RESPONSE at 09:26

## 2017-12-18 RX ADMIN — CALCITRIOL 0.25 MCG: 0.25 CAPSULE, LIQUID FILLED ORAL at 21:50

## 2017-12-18 RX ADMIN — ROPINIROLE HYDROCHLORIDE 3 MG: 2 TABLET, FILM COATED ORAL at 09:51

## 2017-12-18 RX ADMIN — HYDROCODONE BITARTRATE AND ACETAMINOPHEN 1 TABLET: 5; 325 TABLET ORAL at 04:21

## 2017-12-18 ASSESSMENT — PAIN DESCRIPTION - PAIN TYPE: TYPE: ACUTE PAIN

## 2017-12-18 ASSESSMENT — PAIN DESCRIPTION - LOCATION: LOCATION: GENERALIZED

## 2017-12-18 ASSESSMENT — PAIN DESCRIPTION - DESCRIPTORS: DESCRIPTORS: ACHING;DISCOMFORT

## 2017-12-18 ASSESSMENT — PAIN DESCRIPTION - FREQUENCY: FREQUENCY: CONTINUOUS

## 2017-12-18 ASSESSMENT — PAIN SCALES - GENERAL
PAINLEVEL_OUTOF10: 8
PAINLEVEL_OUTOF10: 5

## 2017-12-18 NOTE — FLOWSHEET NOTE
Patient sleeping.  and one other visitor present at bedside.  states that patient is sleeping \"because of what they gave her for the MRI. \"  expresses no needs at present. Writer offers support and listening presence.  and visitor express thanks. Spiritual Care will follow as needed. 12/18/17 1304   Encounter Summary   Services provided to: Patient and family together   Referral/Consult From: [address] [place] [address]; Family members   Continue Visiting (12/18/17)   Complexity of Encounter Low   Length of Encounter 15 minutes   Spiritual Assessment Completed Yes   Routine   Type Initial   Assessment Approachable   Intervention Active listening   Outcome Engaged in conversation;Expressed gratitude

## 2017-12-18 NOTE — PROGRESS NOTES
MCV  93.9   --    --   93.5  94.3   MCH  29.4   --    --   30.1  29.8   MCHC  31.3   --    --   32.2  31.6   RDW  18.1*   --    --   17.0*  17.2*   PLT  148   --    --   136  129*   MPV  8.2   --    --   NOT REPORTED  NOT REPORTED   INR  1.1   --    --    --    --    DDIMER  1.92   --    --    --    --     < > = values in this interval not displayed. Chemistry:  Recent Labs      12/16/17   0847  12/16/17   1200  12/17/17   0515  12/18/17   0541   NA  142   --   142  143   K  5.3   --   5.3  4.4   CL  94*   --   99  98   CO2  29   --   33*  31   GLUCOSE  146*   --   117*  113*   BUN  37*   --   28*  21   CREATININE  2.14*   --   1.78*  1.58*   MG   --    --   2.0   --    ANIONGAP  19*   --   10  14   LABGLOM  23*   --   28*  32*   GFRAA  28*   --   34*  39*   CALCIUM  6.0*   --   5.9*  6.5*   CAION   --    --   0.79*   --    PHOS   --    --   6.3*   --    TROPONINT  <0.03   --    --    --    LACTACIDWB   --   NOT REPORTED   --    --      Recent Labs      12/16/17   0847   12/16/17   2025  12/17/17   0515  12/17/17   0827  12/17/17   1206  12/17/17   1619  12/17/17   1653  12/17/17   2019   PROT  7.0   --    --   6.5   --    --    --    --    --    LABALBU  4.1   --    --   3.7   --    --    --    --    --    TSH  7.58*   --    --    --    --    --    --    --    --    AST  18   --    --   18   --    --    --    --    --    ALT  8   --    --   <5*   --    --    --    --    --    ALKPHOS  48   --    --   49   --    --    --    --    --    BILITOT  0.21*   --    --   0.25*   --    --    --    --    --    POCGLU   --    < >  122*   --   132*  166*  107*  118*  109*    < > = values in this interval not displayed.          Lab Results   Component Value Date/Time    SPECIAL NOT REPORTED 12/16/2017 08:55 AM     Lab Results   Component Value Date/Time    CULTURE NO GROWTH 12/16/2017 08:55 AM    CULTURE  12/16/2017 08:55 AM     Performed at [facility] [address], [address] [phone]       Lab Results   Component Value Date    POCPH 7.36 06/02/2013    POCPCO2 55 06/02/2013    POCPO2 63 06/02/2013    POCHCO3 31.0 06/02/2013    NBEA NOT REPORTED 06/02/2013    PBEA 6 06/02/2013    GON9DPE 33 06/02/2013    JTWG3HMN 90 06/02/2013    FIO2 45.0 01/15/2017       Radiology:    EEG normal    Physical Examination:        General appearance:  alert, cooperative and no distress  Mental Status:  oriented to person, place and time and normal affect  Lungs:  clear to auscultation bilaterally, normal effort  Heart:  regular rate and rhythm, no murmur  Abdomen:  soft, nontender, nondistended, normal bowel sounds, no masses, hepatomegaly, splenomegaly, obese  Extremities:  no redness, tenderness in the calves  Skin:  no rashes, induration; multiple bruises    Assessment:        Primary Problem  KRISTIN (acute kidney injury) [facility])    Active Hospital Problems    Diagnosis Date Noted    Iron deficiency anemia due to chronic blood loss [D50.0] 12/17/2017    Acute on chronic respiratory failure with hypoxia and hypercapnia (HCC) [J96.21, J96.22] 12/17/2017    KRISTIN (acute kidney injury) ([facility] [N17.9] 12/16/2017    Stage 3 chronic kidney disease [N18.3] 08/27/2017    Panlobular emphysema ([facility] [J43.1] 08/18/2017    Parkinson disease ([facility] 02/22/2014    Gastroesophageal reflux disease without esophagitis [K21.9]     ECTRO on CPAP [G47.33, Z99.89]     Essential hypertension [I10] 06/07/2012    Controlled type 2 diabetes mellitus with stage 3 chronic kidney disease, without long-term current use of insulin ([facility] [E11.22, N18.3]        Plan:        1. Dc ivf  2. Resume lasix  3. Keep off aldactone  4. Mri brain today-pt refused; will get repeat ct head  5.  eeg  6. Pt/ot  7. Resume eliquis if ct brain is ok  8. D/w dr loo  9.  Probable dc home later today if does ok with therapy    Zulema Tillman Blood, DO  12/18/2017  7:38 AM

## 2017-12-18 NOTE — PROGRESS NOTES
Physical Therapy    Facility/Department: [facility]  Initial Assessment    NAME: Jerad Benton  : 1945  MRN: [id number]    Discharge Recommendation:   [address]    Date of Service: 2017    Patient Diagnosis(es): The primary encounter diagnosis was Syncope and collapse. Diagnoses of Seizure after head injury ([facility] Acute kidney injury ([facility] Anemia, unspecified type, and Lactic acidosis were also pertinent to this visit. has a past medical history of Acute on chronic diastolic congestive heart failure ([address] Asthma; Atrial fibrillation ([address] Cataracts, bilateral; Cerebral artery occlusion with cerebral infarction ([facility] Chronic kidney disease; Constipation; COPD (chronic obstructive pulmonary disease) ([facility] Diverticulosis; DM2 (diabetes mellitus, type 2) ([facility] Full dentures; GERD (gastroesophageal reflux disease); Headache(784.0); Hyperlipidemia; Hyperplastic polyp of intestine; Hypertension; Morbid obesity with BMI of 40.0-44.9, adult ([facility] ECTOR on CPAP; Osteoarthritis; Parkinson disease ([facility] Spinal stenosis in cervical region; Type II or unspecified type diabetes mellitus without mention of complication, not stated as uncontrolled; Unspecified sleep apnea; and Wears glasses. has a past surgical history that includes Cervical disc surgery (); Appendectomy; Tonsillectomy and adenoidectomy; Cataract removal with implant (Bilateral, ); hernia repair (); eye surgery (Bilateral); Cervical spine surgery (13); laminectomy (13); Abdomen surgery; back surgery; Upper gastrointestinal endoscopy (2016); Colonoscopy (); Colonoscopy (2016); Colonoscopy (2016); Colonoscopy (2017); and colsc flx w/removal lesion by hot bx forceps (N/A, 2017).     Restrictions  Restrictions/Precautions  Restrictions/Precautions: Fall Risk  Vision/Hearing  Vision: Within Functional Limits  Hearing: Within functional limits Subjective  General  Patient assessed for rehabilitation services?: Yes  Response To Previous Treatment: Not applicable  Family / Caregiver Present: Yes ( and daughter present for session)  Follows Commands: Within Functional Limits  Subjective  Subjective: Patient states that her R foot hurts  Pain Screening  Patient Currently in Pain: Yes  Vital Signs  Patient Currently in Pain: Yes       Orientation  Orientation  Overall Orientation Status: Within Functional Limits    Social/Functional History  Social/Functional History  Lives With: Spouse  Type of Home: House  Home Layout: One level  Home Access: Stairs to enter without rails  Entrance Stairs - Number of Steps: 2  Entrance Stairs - Rails: None  Receives Help From: Family  Ambulation Assistance: Independent  Transfer Assistance: Independent  Active : No  Objective     Observation/Palpation  Posture: Fair    AROM RLE (degrees)  RLE AROM: WFL  AROM LLE (degrees)  LLE AROM : WFL  AROM RUE (degrees)  RUE AROM : WFL  AROM LUE (degrees)  LUE AROM : WFL  Strength RLE  Strength RLE: WFL  Comment: 4/5  Strength LLE  Strength LLE: WFL  Comment: 4/5  Strength RUE  Strength RUE: WFL  Comment: 4/5  Strength LUE  Strength LUE: WFL  Comment: 4/5  Tone RLE  RLE Tone: Normotonic  Tone LLE  LLE Tone: Normotonic  Motor Control  Gross Motor?: WFL  Sensation  Overall Sensation Status: WFL  Bed mobility  Bridging: Contact guard assistance  Rolling to Left: Contact guard assistance  Rolling to Right: Contact guard assistance  Supine to Sit: Minimal assistance  Sit to Supine: Moderate assistance  Scooting: Maximal assistance  Transfers  Sit to Stand: Moderate Assistance  Stand to sit: Moderate Assistance  Bed to Chair: Moderate assistance  Ambulation  Ambulation?: Yes  Ambulation 1  Surface: level tile  Device:  (Face to face assist)  Assistance:  Moderate assistance  Quality of Gait: Shuffling, unsteady gait  Distance: 3 feet to bedside commode  Stairs/Curb  Stairs?: No

## 2017-12-18 NOTE — PLAN OF CARE
[facility]    Second Visit Note  For more detailed information please refer to the progress note of the day      12/18/2017    5:10 PM    Name:   Mat Cooks  MRN:     [id number]     Acct:      [de-identified]   Room:   [facility]9-02   Day:  2  Admit Date:  12/16/2017  8:10 AM    PCP:   Heaven Mohamud DO  Code Status:  Full Code        Pt vitals were reviewed   New labs were reviewed   Patient was seen    Updated plan :     1. Awaiting repeat ct head to r/o bleed  2.  Received call from radiologist dr Claude Colbert tiny subarachnoid or subdural hemorrhage seen on repeat ct head; will keep off eliquis at this time        Enedina Perze DO  12/18/2017  5:10 PM

## 2017-12-18 NOTE — PLAN OF CARE
Problem: Falls - Risk of  Goal: Absence of falls  Outcome: Ongoing      Problem: ACTIVITY INTOLERANCE/IMPAIRED MOBILITY  Goal: Mobility/activity is maintained at optimum level for patient  Outcome: Ongoing      Problem:  Activity:  Goal: Fatigue will decrease  Fatigue will decrease   Outcome: Ongoing    Goal: Ability to tolerate increased activity will improve  Ability to tolerate increased activity will improve   Outcome: Ongoing    Goal: Ability to maintain optimal joint mobility will improve  Ability to maintain optimal joint mobility will improve   Outcome: Ongoing      Problem: Cardiac:  Goal: Ability to maintain an adequate cardiac output will improve  Ability to maintain an adequate cardiac output will improve   Outcome: Ongoing    Goal: Ability to maintain adequate ventilation will improve  Ability to maintain adequate ventilation will improve   Outcome: Ongoing    Goal: Ability to achieve and maintain adequate cardiopulmonary perfusion will improve  Ability to achieve and maintain adequate cardiopulmonary perfusion will improve   Outcome: Ongoing      Problem: Pain:  Goal: Pain level will decrease  Pain level will decrease   Outcome: Ongoing    Goal: Control of acute pain  Control of acute pain   Outcome: Ongoing    Goal: Control of chronic pain  Control of chronic pain   Outcome: Ongoing

## 2017-12-18 NOTE — PROGRESS NOTES
Occupational Therapy  DATE: 2017    NAME: Sukhjinder Sotelo  MRN: [id number]   : 1945    Patient not seen this date for Occupational Therapy due to:  [] Blood transfusion in progress  [] Cancel by RN  [] Hemodialysis  [x]  Refusal by Patient   [] Spine Precautions   [] Strict Bedrest  [] Surgery  [] Testing      [] Other          *Attempted OT eval however pt refused due to fatigue. Pt with increased lethargy noted and pt did not open eyes. Per pt's spouse, pt was given valium this am and pt is sleepy. RN notified. [] PT being discontinued at this time. Patient independent. No further needs. [] PT being discontinued at this time as the patient has been transferred to hospice care. No further needs.     Cheryl Ramírez, OT

## 2017-12-18 NOTE — PROGRESS NOTES
magnesium oxide (MAG-OX) tablet 400 mg  400 mg Oral Daily Jose Perez, DO   400 mg at 12/18/17 8645     Allergies   Allergen Reactions    Dye [Barium-Containing Compounds] Other (See Comments)     Cause Afib per     Pcn [Penicillins] Itching and Swelling    Red Dye Itching and Rash     This allergy is likely incorrect:  Per patient's  she has no issue with medications that have red dye in them or red food. He thinks this reaction was added to chart when the pt had a colonoscopy (possibly barium or iodine dye instead)     Principal Problem:    KRISTIN (acute kidney injury) ([facility] [address]  Active Problems:    Controlled type 2 diabetes mellitus with stage 3 chronic kidney disease, without long-term current use of insulin (HCC)    Essential hypertension    Gastroesophageal reflux disease without esophagitis    ECOTR on CPAP    Parkinson disease ([facility])    Iron deficiency anemia due to chronic blood loss    Panlobular emphysema (formerly [facility])    Stage 3 chronic kidney disease    Acute on chronic respiratory failure with hypoxia and hypercapnia ([facility])    Blood pressure (!) 153/72, pulse 91, temperature 98.2 °F (36.8 °C), temperature source Oral, resp. rate 20, height 5' 1\" (1.549 m), weight 191 lb 11.2 oz (87 kg), SpO2 94 %, not currently breastfeeding. Subjective:  Symptoms:  (No neuro changes. ). Objective:  General Appearance:  Uncomfortable. Vital signs: (most recent): Blood pressure (!) 153/72, pulse 91, temperature 98.2 °F (36.8 °C), temperature source Oral, resp. rate 20, height 5' 1\" (1.549 m), weight 191 lb 11.2 oz (87 kg), SpO2 94 %, not currently breastfeeding.  (BP high.). Lungs:  Increased effort. Heart: Normal rate. Neurological: Patient is alert. (EEG normal.). Assessment:  (Syncope. No further spells. EEG normal.  Unable to tolerate or complete MRI. Will try CT as an alternative. ).        Manas Domínguez MD  12/18/2017

## 2017-12-18 NOTE — PLAN OF CARE
Problem: Falls - Risk of  Goal: Absence of falls  Outcome: Ongoing  Patient is fall risk per fall scale. Falling star on door. Fall sticker on armband. Hourly rounding performed. Personal belongings and call light within reach. Bed in low position. Restraint alternatives in place.        Problem: Pain:  Goal: Pain level will decrease  Pain level will decrease   Outcome: Ongoing

## 2017-12-19 ENCOUNTER — APPOINTMENT (OUTPATIENT)
Dept: GENERAL RADIOLOGY | Age: 72
DRG: 682 | End: 2017-12-19
Payer: COMMERCIAL

## 2017-12-19 LAB
ANION GAP SERPL CALCULATED.3IONS-SCNC: 13 MMOL/L (ref 9–17)
BUN BLDV-MCNC: 18 MG/DL (ref 8–23)
BUN/CREAT BLD: 14 (ref 9–20)
CALCIUM SERPL-MCNC: 6.6 MG/DL (ref 8.6–10.4)
CHLORIDE BLD-SCNC: 95 MMOL/L (ref 98–107)
CO2: 35 MMOL/L (ref 20–31)
CREAT SERPL-MCNC: 1.32 MG/DL (ref 0.5–0.9)
GFR AFRICAN AMERICAN: 48 ML/MIN
GFR NON-AFRICAN AMERICAN: 40 ML/MIN
GFR SERPL CREATININE-BSD FRML MDRD: ABNORMAL ML/MIN/{1.73_M2}
GFR SERPL CREATININE-BSD FRML MDRD: ABNORMAL ML/MIN/{1.73_M2}
GLUCOSE BLD-MCNC: 149 MG/DL (ref 65–105)
GLUCOSE BLD-MCNC: 190 MG/DL (ref 65–105)
GLUCOSE BLD-MCNC: 218 MG/DL (ref 65–105)
GLUCOSE BLD-MCNC: 83 MG/DL (ref 70–99)
GLUCOSE BLD-MCNC: 94 MG/DL (ref 65–105)
HCT VFR BLD CALC: 24 % (ref 36–46)
HEMOGLOBIN: 7.8 G/DL (ref 12–16)
MCH RBC QN AUTO: 30.4 PG (ref 26–34)
MCHC RBC AUTO-ENTMCNC: 32.5 G/DL (ref 31–37)
MCV RBC AUTO: 93.4 FL (ref 80–100)
PDW BLD-RTO: 16.4 % (ref 11.5–14.5)
PLATELET # BLD: 123 K/UL (ref 130–400)
PMV BLD AUTO: ABNORMAL FL (ref 6–12)
POTASSIUM SERPL-SCNC: 4.1 MMOL/L (ref 3.7–5.3)
RBC # BLD: 2.57 M/UL (ref 4–5.2)
SODIUM BLD-SCNC: 143 MMOL/L (ref 135–144)
WBC # BLD: 9.8 K/UL (ref 3.5–11)

## 2017-12-19 PROCEDURE — 80048 BASIC METABOLIC PNL TOTAL CA: CPT

## 2017-12-19 PROCEDURE — 6370000000 HC RX 637 (ALT 250 FOR IP): Performed by: INTERNAL MEDICINE

## 2017-12-19 PROCEDURE — 36415 COLL VENOUS BLD VENIPUNCTURE: CPT

## 2017-12-19 PROCEDURE — 82947 ASSAY GLUCOSE BLOOD QUANT: CPT

## 2017-12-19 PROCEDURE — 97110 THERAPEUTIC EXERCISES: CPT

## 2017-12-19 PROCEDURE — 2060000000 HC ICU INTERMEDIATE R&B

## 2017-12-19 PROCEDURE — 6360000002 HC RX W HCPCS: Performed by: INTERNAL MEDICINE

## 2017-12-19 PROCEDURE — 97535 SELF CARE MNGMENT TRAINING: CPT

## 2017-12-19 PROCEDURE — G8987 SELF CARE CURRENT STATUS: HCPCS

## 2017-12-19 PROCEDURE — 94640 AIRWAY INHALATION TREATMENT: CPT

## 2017-12-19 PROCEDURE — 94660 CPAP INITIATION&MGMT: CPT

## 2017-12-19 PROCEDURE — 94761 N-INVAS EAR/PLS OXIMETRY MLT: CPT

## 2017-12-19 PROCEDURE — 73610 X-RAY EXAM OF ANKLE: CPT

## 2017-12-19 PROCEDURE — 85027 COMPLETE CBC AUTOMATED: CPT

## 2017-12-19 PROCEDURE — 2700000000 HC OXYGEN THERAPY PER DAY

## 2017-12-19 PROCEDURE — G8988 SELF CARE GOAL STATUS: HCPCS

## 2017-12-19 PROCEDURE — 97166 OT EVAL MOD COMPLEX 45 MIN: CPT

## 2017-12-19 PROCEDURE — 99233 SBSQ HOSP IP/OBS HIGH 50: CPT | Performed by: INTERNAL MEDICINE

## 2017-12-19 PROCEDURE — 2580000003 HC RX 258: Performed by: INTERNAL MEDICINE

## 2017-12-19 RX ORDER — METHYLPREDNISOLONE SODIUM SUCCINATE 40 MG/ML
40 INJECTION, POWDER, LYOPHILIZED, FOR SOLUTION INTRAMUSCULAR; INTRAVENOUS EVERY 8 HOURS
Status: DISCONTINUED | OUTPATIENT
Start: 2017-12-19 | End: 2017-12-21

## 2017-12-19 RX ADMIN — MICONAZORB AF ANTIFUNGAL POWDER: 1.42 POWDER TOPICAL at 21:27

## 2017-12-19 RX ADMIN — METHYLPREDNISOLONE SODIUM SUCCINATE 40 MG: 40 INJECTION, POWDER, FOR SOLUTION INTRAMUSCULAR; INTRAVENOUS at 17:33

## 2017-12-19 RX ADMIN — METOPROLOL TARTRATE 25 MG: 25 TABLET ORAL at 08:45

## 2017-12-19 RX ADMIN — OXYCODONE HYDROCHLORIDE AND ACETAMINOPHEN 500 MG: 500 TABLET ORAL at 21:26

## 2017-12-19 RX ADMIN — MAGNESIUM OXIDE TAB 400 MG (241.3 MG ELEMENTAL MG) 400 MG: 400 (241.3 MG) TAB at 10:21

## 2017-12-19 RX ADMIN — CALCITRIOL 0.25 MCG: 0.25 CAPSULE, LIQUID FILLED ORAL at 12:37

## 2017-12-19 RX ADMIN — CALCITRIOL 0.25 MCG: 0.25 CAPSULE, LIQUID FILLED ORAL at 21:26

## 2017-12-19 RX ADMIN — ALBUTEROL SULFATE 2.5 MG: 2.5 SOLUTION RESPIRATORY (INHALATION) at 21:05

## 2017-12-19 RX ADMIN — FERROUS SULFATE TAB EC 325 MG (65 MG FE EQUIVALENT) 325 MG: 325 (65 FE) TABLET DELAYED RESPONSE at 08:46

## 2017-12-19 RX ADMIN — PANTOPRAZOLE SODIUM 40 MG: 40 TABLET, DELAYED RELEASE ORAL at 17:36

## 2017-12-19 RX ADMIN — HYDROCODONE BITARTRATE AND ACETAMINOPHEN 1 TABLET: 5; 325 TABLET ORAL at 21:37

## 2017-12-19 RX ADMIN — CALCITRIOL 0.25 MCG: 0.25 CAPSULE, LIQUID FILLED ORAL at 08:45

## 2017-12-19 RX ADMIN — Medication 500 MG: at 21:26

## 2017-12-19 RX ADMIN — MOMETASONE FUROATE AND FORMOTEROL FUMARATE DIHYDRATE 2 PUFF: 200; 5 AEROSOL RESPIRATORY (INHALATION) at 09:52

## 2017-12-19 RX ADMIN — CARBIDOPA AND LEVODOPA 1 TABLET: 25; 100 TABLET, EXTENDED RELEASE ORAL at 21:26

## 2017-12-19 RX ADMIN — PANTOPRAZOLE SODIUM 40 MG: 40 TABLET, DELAYED RELEASE ORAL at 08:45

## 2017-12-19 RX ADMIN — CARBIDOPA AND LEVODOPA 1 TABLET: 25; 100 TABLET, EXTENDED RELEASE ORAL at 17:36

## 2017-12-19 RX ADMIN — FUROSEMIDE 40 MG: 40 TABLET ORAL at 08:45

## 2017-12-19 RX ADMIN — TIOTROPIUM BROMIDE 18 MCG: 18 CAPSULE ORAL; RESPIRATORY (INHALATION) at 09:52

## 2017-12-19 RX ADMIN — MICONAZORB AF ANTIFUNGAL POWDER: 1.42 POWDER TOPICAL at 08:46

## 2017-12-19 RX ADMIN — Medication 10 ML: at 21:27

## 2017-12-19 RX ADMIN — HYDROCODONE BITARTRATE AND ACETAMINOPHEN 1 TABLET: 5; 325 TABLET ORAL at 08:52

## 2017-12-19 RX ADMIN — Medication 500 MG: at 08:45

## 2017-12-19 RX ADMIN — FERROUS SULFATE TAB EC 325 MG (65 MG FE EQUIVALENT) 325 MG: 325 (65 FE) TABLET DELAYED RESPONSE at 17:36

## 2017-12-19 RX ADMIN — METHYLPREDNISOLONE SODIUM SUCCINATE 40 MG: 40 INJECTION, POWDER, FOR SOLUTION INTRAMUSCULAR; INTRAVENOUS at 23:35

## 2017-12-19 RX ADMIN — ROPINIROLE HYDROCHLORIDE 2 MG: 2 TABLET, FILM COATED ORAL at 21:27

## 2017-12-19 RX ADMIN — ALBUTEROL SULFATE 2.5 MG: 2.5 SOLUTION RESPIRATORY (INHALATION) at 09:52

## 2017-12-19 RX ADMIN — HYDROCODONE BITARTRATE AND ACETAMINOPHEN 1 TABLET: 5; 325 TABLET ORAL at 01:26

## 2017-12-19 RX ADMIN — ROPINIROLE HYDROCHLORIDE 3 MG: 2 TABLET, FILM COATED ORAL at 08:45

## 2017-12-19 RX ADMIN — INSULIN LISPRO 2 UNITS: 100 INJECTION, SOLUTION INTRAVENOUS; SUBCUTANEOUS at 12:37

## 2017-12-19 RX ADMIN — OXYCODONE HYDROCHLORIDE AND ACETAMINOPHEN 500 MG: 500 TABLET ORAL at 08:45

## 2017-12-19 RX ADMIN — CYANOCOBALAMIN TAB 1000 MCG 1000 MCG: 1000 TAB at 08:46

## 2017-12-19 RX ADMIN — RASAGILINE MESYLATE 1 MG: 0.5 TABLET ORAL at 08:45

## 2017-12-19 RX ADMIN — ROPINIROLE HYDROCHLORIDE 2 MG: 2 TABLET, FILM COATED ORAL at 12:37

## 2017-12-19 RX ADMIN — Medication 10 ML: at 08:44

## 2017-12-19 RX ADMIN — METOPROLOL TARTRATE 25 MG: 25 TABLET ORAL at 21:27

## 2017-12-19 RX ADMIN — ATORVASTATIN CALCIUM 20 MG: 20 TABLET, FILM COATED ORAL at 21:26

## 2017-12-19 RX ADMIN — Medication 500 MG: at 12:37

## 2017-12-19 RX ADMIN — CHOLECALCIFEROL CAP 125 MCG (5000 UNIT) 5000 UNITS: 125 CAP at 08:45

## 2017-12-19 RX ADMIN — MOMETASONE FUROATE AND FORMOTEROL FUMARATE DIHYDRATE 2 PUFF: 200; 5 AEROSOL RESPIRATORY (INHALATION) at 20:42

## 2017-12-19 RX ADMIN — CARBIDOPA AND LEVODOPA 1 TABLET: 25; 100 TABLET, EXTENDED RELEASE ORAL at 08:45

## 2017-12-19 RX ADMIN — INSULIN LISPRO 1 UNITS: 100 INJECTION, SOLUTION INTRAVENOUS; SUBCUTANEOUS at 17:36

## 2017-12-19 RX ADMIN — AMIODARONE HYDROCHLORIDE 200 MG: 200 TABLET ORAL at 08:45

## 2017-12-19 RX ADMIN — INSULIN LISPRO 1 UNITS: 100 INJECTION, SOLUTION INTRAVENOUS; SUBCUTANEOUS at 21:27

## 2017-12-19 RX ADMIN — CARBIDOPA AND LEVODOPA 1 TABLET: 25; 100 TABLET, EXTENDED RELEASE ORAL at 12:37

## 2017-12-19 ASSESSMENT — PAIN SCALES - GENERAL
PAINLEVEL_OUTOF10: 10
PAINLEVEL_OUTOF10: 9

## 2017-12-19 ASSESSMENT — PAIN DESCRIPTION - PAIN TYPE
TYPE: ACUTE PAIN
TYPE: ACUTE PAIN

## 2017-12-19 ASSESSMENT — PAIN DESCRIPTION - LOCATION
LOCATION: HEAD
LOCATION: GENERALIZED
LOCATION: GENERALIZED

## 2017-12-19 ASSESSMENT — PAIN DESCRIPTION - FREQUENCY: FREQUENCY: CONTINUOUS

## 2017-12-19 ASSESSMENT — PAIN DESCRIPTION - DESCRIPTORS
DESCRIPTORS: ACHING
DESCRIPTORS: ACHING

## 2017-12-19 NOTE — PLAN OF CARE
Problem: Falls - Risk of  Goal: Absence of falls  Outcome: Ongoing  Fall risk assessment completed. Patient instructed to use call light. Bed locked and in lowest position, side rails up 2/4, call light and bedside table within reach, clutter removed, and non-skid footwear on when pt out of bed. Bed alarm on. Side-rails up per family/patient request. Hourly rounds will continue. Problem: Activity:  Goal: Ability to tolerate increased activity will improve  Ability to tolerate increased activity will improve   Outcome: Not Met This Shift  Therapy at bedside today. OT unable to continue to work with patient after having patient sit at the bedside and dangle. Patients oxygen saturation dropped with this movement. Will continue to monitor. PT to see patient this afternoon. Problem: Pain:  Goal: Control of acute pain  Control of acute pain   Outcome: Ongoing      Problem: Risk for Impaired Skin Integrity  Goal: Tissue integrity - skin and mucous membranes  Structural intactness and normal physiological function of skin and  mucous membranes. Outcome: Ongoing  Skin assessment completed. Patient repositioned every 2 hours and PRN, risk for pressure ulcer assessed as well as bony prominences. Patient instructed to turn self when applicable. Will continue to monitor. Problem: Gas Exchange - Impaired:  Goal: Levels of oxygenation will improve  Levels of oxygenation will improve   Outcome: Ongoing  Patient able to remain off of BIPAP today. Patient placed on both simple mask and nasal cannula intermittently. Patient does well on 1-2 liters nasal cannula if she is awake. Patient needs simple mask while resting as she breathes through her mouth. Oxygenation levels kept between 88-94% at this time. Will continue to monitor.

## 2017-12-19 NOTE — PROGRESS NOTES
Patient requested to come off Bi-Pap for a few minutes. SpO2 sats dropped into 60's.  RT called, Bi-Pap reapplied, O2 sats went up to 90's

## 2017-12-19 NOTE — PROGRESS NOTES
kidney injury ([facility] [address] Anemia, unspecified type, and Lactic acidosis were also pertinent to this visit. has a past medical history of Acute on chronic diastolic congestive heart failure ([facility] Asthma; Atrial fibrillation ([facility] Cataracts, bilateral; Cerebral artery occlusion with cerebral infarction ([facility] Chronic kidney disease; Constipation; COPD (chronic obstructive pulmonary disease) ([facility] Diverticulosis; DM2 (diabetes mellitus, type 2) ([facility] Full dentures; GERD (gastroesophageal reflux disease); Headache(784.0); Hyperlipidemia; Hyperplastic polyp of intestine; Hypertension; Morbid obesity with BMI of 40.0-44.9, adult ([facility] ECTOR on CPAP; Osteoarthritis; Parkinson disease ([facility] Spinal stenosis in cervical region; Type II or unspecified type diabetes mellitus without mention of complication, not stated as uncontrolled; Unspecified sleep apnea; and Wears glasses. has a past surgical history that includes Cervical disc surgery (2012); Appendectomy; Tonsillectomy and adenoidectomy; Cataract removal with implant (Bilateral, 2007); hernia repair (1999); eye surgery (Bilateral); Cervical spine surgery (5/31/13); laminectomy (5/31/13); Abdomen surgery; back surgery; Upper gastrointestinal endoscopy (12/28/2016); Colonoscopy (2009); Colonoscopy (12/29/2016); Colonoscopy (12/30/2016); Colonoscopy (04/25/2017); and colsc flx w/removal lesion by hot bx forceps (N/A, 4/25/2017).            Restrictions  Restrictions/Precautions  Restrictions/Precautions: Fall Risk, General Precautions    Subjective   General  Patient assessed for rehabilitation services?: Yes  Family / Caregiver Present: No  Pain Assessment  Patient Currently in Pain: Yes  Pain Assessment: 0-10  Pain Level: 9  Pain Type: Acute pain  Pain Location: Generalized  Pain Descriptors: Aching  Pain Frequency: Continuous  Pain Intervention(s): Emotional support, Environmental changes, Repositioned  Response to Pain Intervention: Asleep with RR greater than to sit pt up EOB and pt desats to 73-77%. All ADLs limited by dec. endurance and SOB/O2 levels dropping. Tone RUE  RUE Tone: Normotonic  Tone LUE  LUE Tone: Normotonic  Coordination  Movements Are Fluid And Coordinated: Yes     Bed mobility  Rolling to Left: Minimal assistance  Rolling to Right: Minimal assistance  Supine to Sit: Moderate assistance  Sit to Supine: Moderate assistance  Scooting: Maximal assistance  Transfers  Sit to stand: Moderate assistance  Stand to sit: Moderate assistance  Transfer Comments: sit to stand from EOB     Cognition  Overall Cognitive Status: Exceptions  Following Commands: Follows one step commands with repetition  Attention Span: Unable to maintain attention  Safety Judgement: Decreased awareness of need for safety  Problem Solving: Assistance required to generate solutions;Assistance required to correct errors made;Decreased awareness of errors;Assistance required to implement solutions  Insights: Decreased awareness of deficits  Cognition Comment: pt requires cues to keep eyes open and attend to directions. pt is disoriented to time  Perception  Overall Perceptual Status: WFL     Sensation  Overall Sensation Status: WFL        LUE AROM (degrees)  LUE AROM : WFL  RUE AROM (degrees)  RUE AROM : WFL  LUE Strength  LUE Strength Comment: 3-3+/5; difficulty following directions for MMT                  Assessment   Performance deficits / Impairments: Decreased functional mobility ; Decreased ADL status; Decreased safe awareness;Decreased cognition;Decreased endurance;Decreased balance  Prognosis: Fair  Decision Making: Medium Complexity  Patient Education: OT POC, discharge recommendations, safety with mob and transfers, breathing techs and benefits of sitting up  Discharge Recommendations: [address] UP: Yes  Activity Tolerance  Activity Tolerance: Patient limited by fatigue (SOB)  Activity Tolerance: Pt able to sit EOB with encouragement and briefly stand to take 2 small steps towards HOB. O2 sats drop with sitting and pt requiring max cues to use pursed lip breathing techs  Safety Devices  Safety Devices in place: Yes  Type of devices: Call light within reach;Nurse notified; Left in bed;Bed alarm in place;Gait belt;Patient at risk for falls        Discharge Recommendations:  [address] [facility]  Times per week: 4-5x/week, 1-2x/week  Current Treatment Recommendations: Strengthening, Balance Training, Functional Mobility Training, Endurance Training, Self-Care / ADL, Safety Education & Training, Patient/Caregiver Education & Training, Equipment Evaluation, Education, & procurement, Positioning    G-Code  OT G-codes  Functional Assessment Tool Used: Uriel Gearing \"6 clicks\" Inpatient Daily activity short form  Score: 11  Functional Limitation: Self care  Self Care Current Status (): At least 60 percent but less than 80 percent impaired, limited or restricted  Self Care Goal Status ():  At least 40 percent but less than 60 percent impaired, limited or restricted  OutComes Score                                           AM-PAC Score        AM-PAC Inpatient Daily Activity Raw Score: 11  AM-PAC Inpatient ADL T-Scale Score : 29.04  ADL Inpatient CMS 0-100% Score: 70.42  ADL Inpatient CMS G-Code Modifier : CL    Goals  Short term goals  Time Frame for Short term goals: by discharge, pt will  Short term goal 1: demo CGA with ADL transfers with good safety  Short term goal 2: demo CGA with functional mob for ADL completion in room with good safety and pacing  Short term goal 3: demo min A for toileting routine at approp level  Short term goal 4: demo min A UB ADLs and mod A LB ADLs with approp DME and breathing techs  Short term goal 5: verb and demo good understanding of fall prevention techs and EC/WS techs along with equip needs for home  Patient Goals   Patient goals : to go to facility priort to going home       Therapy Time Individual Concurrent Group Co-treatment   Time In 0801         Time Out 5958         Minutes 46              Patient would benefit from SNF for continued occupational therapy to increase independence with  ADL of bathing, dressing, toileting and grooming. Writer recommending SNF placement for for activity tolerance and strength which will increase independence with ADL's coordinated with bed mobility and chair transfers. Continued skilled OT services to address decreased safety awareness with ADL and IADL tasks and for education and increased independence with DME and AE for fall prevention and ec/ws techniques prior to d/c home.     Jeffery Layton, OT

## 2017-12-19 NOTE — PROGRESS NOTES
Susan Ring is a 81 y.o. female patient.     Current Facility-Administered Medications   Medication Dose Route Frequency Provider Last Rate Last Dose    furosemide (LASIX) tablet 40 mg  40 mg Oral Daily Jose P Blood, DO   40 mg at 12/19/17 0845    vitamin C (ASCORBIC ACID) tablet 500 mg  500 mg Oral BID Jose P Blood, DO   500 mg at 12/19/17 0845    ferrous sulfate EC tablet 325 mg  325 mg Oral BID WC Jose P Blood, DO   325 mg at 12/19/17 0846    amiodarone (CORDARONE) tablet 200 mg  200 mg Oral Daily Jose P Blood, DO   200 mg at 12/19/17 0845    albuterol (PROVENTIL) nebulizer solution 2.5 mg  2.5 mg Nebulization Q6H PRN Jose P Blood, DO   2.5 mg at 12/19/17 2943    atorvastatin (LIPITOR) tablet 20 mg  20 mg Oral Daily Jose P Blood, DO   20 mg at 12/18/17 2205    rasagiline (AZILECT) tablet 1 mg  1 mg Oral Daily Jose P Blood, DO   1 mg at 12/19/17 0845    calcitRIOL (ROCALTROL) capsule 0.25 mcg  0.25 mcg Oral TID Jose P Blood, DO   0.25 mcg at 12/19/17 0845    metoprolol tartrate (LOPRESSOR) tablet 25 mg  25 mg Oral BID Jose P Blood, DO   25 mg at 12/19/17 0845    miconazole (MICOTIN) 2 % powder   Topical BID Jose P Blood, DO        mometasone-formoterol (DULERA) 200-5 MCG/ACT inhaler 2 puff  2 puff Inhalation BID Jose P Blood, DO   2 puff at 12/19/17 0952    pantoprazole (PROTONIX) tablet 40 mg  40 mg Oral BID AC Jose P Blood, DO   40 mg at 12/19/17 0845    rOPINIRole (REQUIP) tablet 2 mg  2 mg Oral 2 times per day [facility] P Blood, DO   2 mg at 12/18/17 2150    vitamin B-12 (CYANOCOBALAMIN) tablet 1,000 mcg  1,000 mcg Oral Daily Jose P Blood, DO   1,000 mcg at 12/19/17 0846    sodium chloride flush 0.9 % injection 10 mL  10 mL Intravenous 2 times per day Jose P Blood, DO   10 mL at 12/19/17 0844    sodium chloride flush 0.9 % injection 10 mL  10 mL Intravenous PRN Jose HO Blood, DO        acetaminophen (TYLENOL) tablet 650 mg  650 mg Oral Q4H PRN Jose P Blood, DO        HYDROcodone-acetaminophen (NORCO) 5-325 MG per tablet 1 tablet  1 tablet Oral Q4H PRN Jose P Blood, DO   1 tablet at 12/19/17 0852    morphine (PF) injection 2 mg  2 mg Intravenous Q2H PRN Jose P Blood, DO        Or    morphine injection 4 mg  4 mg Intravenous Q2H PRN Jose P Blood, DO        magnesium hydroxide (MILK OF MAGNESIA) 400 MG/5ML suspension 30 mL  30 mL Oral Daily PRN Jose P Blood, DO        bisacodyl (DULCOLAX) suppository 10 mg  10 mg Rectal Daily PRN Jose P Blood, DO        ondansetron (ZOFRAN) injection 4 mg  4 mg Intravenous Q6H PRN Jose P Blood, DO        nicotine (NICODERM CQ) 21 MG/24HR 1 patch  1 patch Transdermal Daily PRN Jose P Blood, DO        insulin lispro (HUMALOG) injection vial 0-6 Units  0-6 Units Subcutaneous TID WC Jose P Blood, DO   1 Units at 12/17/17 1231    insulin lispro (HUMALOG) injection vial 0-3 Units  0-3 Units Subcutaneous Nightly Jose P Blood, DO        glucose (GLUTOSE) 40 % oral gel 15 g  15 g Oral PRN Jose P Blood, DO        dextrose 50 % solution 12.5 g  12.5 g Intravenous PRN Jose P Blood, DO        glucagon (rDNA) injection 1 mg  1 mg Intramuscular PRN Jose P Blood, DO        dextrose 5 % solution  100 mL/hr Intravenous PRN Jose P Blood, DO        rOPINIRole (REQUIP) tablet 3 mg  3 mg Oral QAM Jose P Blood, DO   3 mg at 12/19/17 0845    carbidopa-levodopa (SINEMET CR)  MG per extended release tablet 1 tablet  1 tablet Oral 4x Daily Jose P Blood, DO   1 tablet at 12/19/17 0845    tiotropium (SPIRIVA) inhalation capsule 18 mcg  18 mcg Inhalation Daily Jose P Blood, DO   18 mcg at 12/19/17 0410    calcium carbonate (TUMS) chewable tablet 500 mg  500 mg Oral TID Reda Newton P Blood, DO   500 mg at 12/19/17 0845    0.9 % sodium chloride bolus  250 mL Intravenous Once Jose P Blood, DO        vitamin D (CHOLECALCIFEROL) capsule 5,000 Units  5,000 Units Oral Daily Jeffrey Andrew scalp hematoma. ). Assessment:  (Parkinson's. Falling. Push PT. At least a short stay in rehab would be beneficial. ).        Emiliana Peters MD  12/19/2017

## 2017-12-19 NOTE — PROGRESS NOTES
Physical Therapy  DATE: 2017    NAME: Harsha Landa  MRN: [id number]   : 1945  Discharge Recommendation:   [address]       17 1401   Restrictions/Precautions   Restrictions/Precautions Fall Risk;General Precautions   General   Chart Reviewed Yes   Pain Screening   Patient Currently in Pain Yes   Pain Assessment   Pain Assessment 0-10   Ambulation   Ambulation? No   Exercises   Comments supine UE light resistance tband ex x 15 reps, LE AROM x 15 reps    Other Activities   Comment pt's oxygen sats unstable at this time therefore,      Short term goals   Short term goal 1 Patient will be IND with bed mobility to promote pressure relief for back and buttocks   Short term goal 2 Patient will be IND with transfers to promote safe access to bathroom   Short term goal 3 Patient will amb with SBA on level surfaces with appropriate device without LOB   Short term goal 4 Patient will demo 'fair +' sitting and standing balance to promote safety with all functional mobility   Conditions Requiring Skilled Therapeutic Intervention   Body structures, Functions, Activity limitations Decreased functional mobility ; Decreased strength;Decreased safe awareness;Decreased endurance;Decreased balance   Assessment pt limited by oxygen status   Discharge Recommendations Subacute/Skilled Nursing Facility   Activity Tolerance   Activity Tolerance Patient Tolerated treatment well;Treatment limited secondary to medical complications (free text)   Plan   Times per week 1-2 times a day, 5-6 days a week   Current Treatment Recommendations Strengthening;Balance Training;Functional Mobility Training;Transfer Training; Endurance Training;Gait Training;Stair training;Home Exercise Program;Safety Education & Training   Gilles Samson PTA     Time in: 1401 Time out: 1416

## 2017-12-19 NOTE — PROGRESS NOTES
infarction [facility]); Chronic kidney disease; Constipation; COPD (chronic obstructive pulmonary disease) ([facility] Diverticulosis; DM2 (diabetes mellitus, type 2) ([facility] Full dentures; GERD (gastroesophageal reflux disease); Headache(784.0); Hyperlipidemia; Hyperplastic polyp of intestine; Hypertension; Morbid obesity with BMI of 40.0-44.9, adult ([facility] ECTOR on CPAP; Osteoarthritis; Parkinson disease ([facility] Spinal stenosis in cervical region; Type II or unspecified type diabetes mellitus without mention of complication, not stated as uncontrolled; Unspecified sleep apnea; and Wears glasses. Social History:   reports that she quit smoking about 37 years ago. Her smoking use included Cigarettes. She has a 30.00 pack-year smoking history. She has never used smokeless tobacco. She reports that she drinks about 1.2 oz of alcohol per week . She reports that she uses drugs, including Marijuana. Family History:   Family History   Problem Relation Age of Onset    Heart Failure Mother     Hypertension Mother     Heart Disease Mother     High Blood Pressure Mother     Asthma Other        Vitals:  BP (!) 117/49   Pulse 71   Temp 98.8 °F (37.1 °C) (Oral)   Resp 20   Ht 5' 1\" (1.549 m)   Wt 191 lb 11.2 oz (87 kg)   SpO2 93%   BMI 36.22 kg/m²   Temp (24hrs), Av.5 °F (36.9 °C), Min:97.7 °F (36.5 °C), Max:99.1 °F (37.3 °C)    Recent Labs      17   1647  17   2021  17   0755  17   1158   POCGLU  97  123*  94  218*       I/O (24Hr):     Intake/Output Summary (Last 24 hours) at 17 1336  Last data filed at 17 1330   Gross per 24 hour   Intake              358 ml   Output             2000 ml   Net            -1642 ml       Labs:    Hematology:  Recent Labs      17   0515  17   0541  17   0545   WBC  9.1  9.5  9.8   RBC  2.68*  2.50*  2.57*   HGB  8.1*  7.4*  7.8*   HCT  25.1*  23.5*  24.0*   MCV  93.5  94.3  93.4   MCH  30.1  29.8  30.4   MCHC  32.2  31.6  32.5   RDW 17.0*  17.2*  16.4*   PLT  136  129*  123*   MPV  NOT REPORTED  NOT REPORTED  NOT REPORTED     Chemistry:  Recent Labs      12/17/17   0515  12/18/17   0541  12/19/17   0545   NA  142  143  143   K  5.3  4.4  4.1   CL  99  98  95*   CO2  33*  31  35*   GLUCOSE  117*  113*  83   BUN  28*  21  18   CREATININE  1.78*  1.58*  1.32*   MG  2.0   --    --    ANIONGAP  10  14  13   LABGLOM  28*  32*  40*   GFRAA  34*  39*  48*   CALCIUM  5.9*  6.5*  6.6*   CAION  0.79*   --    --    PHOS  6.3*   --    --      Recent Labs      12/17/17   0515   12/18/17   0737  12/18/17   1143  12/18/17   1647  12/18/17   2021  12/19/17   0755  12/19/17   1158   PROT  6.5   --    --    --    --    --    --    --    LABALBU  3.7   --    --    --    --    --    --    --    AST  18   --    --    --    --    --    --    --    ALT  <5*   --    --    --    --    --    --    --    ALKPHOS  49   --    --    --    --    --    --    --    BILITOT  0.25*   --    --    --    --    --    --    --    POCGLU   --    < >  130*  133*  97  123*  94  218*    < > = values in this interval not displayed. Lab Results   Component Value Date/Time    SPECIAL NOT REPORTED 12/16/2017 08:55 AM     Lab Results   Component Value Date/Time    CULTURE NO GROWTH 12/16/2017 08:55 AM    CULTURE  12/16/2017 08:55 AM     Performed at [address] [place] [address], [address] [place] [address] [phone]       Lab Results   Component Value Date    POCPH 7.36 06/02/2013    POCPCO2 55 06/02/2013    POCPO2 63 06/02/2013    POCHCO3 31.0 06/02/2013    NBEA NOT REPORTED 06/02/2013    PBEA 6 06/02/2013    JED1OMI 33 06/02/2013    ILIZ8AKW 90 06/02/2013    FIO2 45.0 01/15/2017       Radiology:    Ct head:     Impression   Subtly increased attenuation is noted in the subarachnoid area in the   bifrontal region which may represent interval development of a small amount   of subarachnoid blood.  This lies the immediately deep to the scalp hematoma.          Physical Examination:

## 2017-12-20 ENCOUNTER — APPOINTMENT (OUTPATIENT)
Dept: GENERAL RADIOLOGY | Age: 72
DRG: 682 | End: 2017-12-20
Payer: COMMERCIAL

## 2017-12-20 LAB
ANION GAP SERPL CALCULATED.3IONS-SCNC: 14 MMOL/L (ref 9–17)
BUN BLDV-MCNC: 19 MG/DL (ref 8–23)
BUN/CREAT BLD: 16 (ref 9–20)
CALCIUM SERPL-MCNC: 7 MG/DL (ref 8.6–10.4)
CHLORIDE BLD-SCNC: 91 MMOL/L (ref 98–107)
CO2: 36 MMOL/L (ref 20–31)
CREAT SERPL-MCNC: 1.22 MG/DL (ref 0.5–0.9)
GFR AFRICAN AMERICAN: 53 ML/MIN
GFR NON-AFRICAN AMERICAN: 44 ML/MIN
GFR SERPL CREATININE-BSD FRML MDRD: ABNORMAL ML/MIN/{1.73_M2}
GFR SERPL CREATININE-BSD FRML MDRD: ABNORMAL ML/MIN/{1.73_M2}
GLUCOSE BLD-MCNC: 227 MG/DL (ref 70–99)
GLUCOSE BLD-MCNC: 233 MG/DL (ref 65–105)
GLUCOSE BLD-MCNC: 247 MG/DL (ref 65–105)
GLUCOSE BLD-MCNC: 280 MG/DL (ref 65–105)
GLUCOSE BLD-MCNC: 312 MG/DL (ref 65–105)
HCT VFR BLD CALC: 26.9 % (ref 36–46)
HEMOGLOBIN: 8.3 G/DL (ref 12–16)
MCH RBC QN AUTO: 29.2 PG (ref 26–34)
MCHC RBC AUTO-ENTMCNC: 30.9 G/DL (ref 31–37)
MCV RBC AUTO: 94.6 FL (ref 80–100)
PDW BLD-RTO: 16.6 % (ref 11.5–14.5)
PLATELET # BLD: 132 K/UL (ref 130–400)
PMV BLD AUTO: ABNORMAL FL (ref 6–12)
POTASSIUM SERPL-SCNC: 4.5 MMOL/L (ref 3.7–5.3)
RBC # BLD: 2.85 M/UL (ref 4–5.2)
SODIUM BLD-SCNC: 141 MMOL/L (ref 135–144)
WBC # BLD: 9.3 K/UL (ref 3.5–11)

## 2017-12-20 PROCEDURE — 94761 N-INVAS EAR/PLS OXIMETRY MLT: CPT

## 2017-12-20 PROCEDURE — 97535 SELF CARE MNGMENT TRAINING: CPT

## 2017-12-20 PROCEDURE — 6360000002 HC RX W HCPCS: Performed by: INTERNAL MEDICINE

## 2017-12-20 PROCEDURE — 6360000002 HC RX W HCPCS: Performed by: NURSE PRACTITIONER

## 2017-12-20 PROCEDURE — 99222 1ST HOSP IP/OBS MODERATE 55: CPT | Performed by: INTERNAL MEDICINE

## 2017-12-20 PROCEDURE — 80048 BASIC METABOLIC PNL TOTAL CA: CPT

## 2017-12-20 PROCEDURE — 36415 COLL VENOUS BLD VENIPUNCTURE: CPT

## 2017-12-20 PROCEDURE — 6370000000 HC RX 637 (ALT 250 FOR IP): Performed by: INTERNAL MEDICINE

## 2017-12-20 PROCEDURE — 94660 CPAP INITIATION&MGMT: CPT

## 2017-12-20 PROCEDURE — 82947 ASSAY GLUCOSE BLOOD QUANT: CPT

## 2017-12-20 PROCEDURE — 94640 AIRWAY INHALATION TREATMENT: CPT

## 2017-12-20 PROCEDURE — 85027 COMPLETE CBC AUTOMATED: CPT

## 2017-12-20 PROCEDURE — 97116 GAIT TRAINING THERAPY: CPT

## 2017-12-20 PROCEDURE — 2580000003 HC RX 258: Performed by: INTERNAL MEDICINE

## 2017-12-20 PROCEDURE — 71010 XR CHEST PORTABLE: CPT

## 2017-12-20 PROCEDURE — 2700000000 HC OXYGEN THERAPY PER DAY

## 2017-12-20 PROCEDURE — 2060000000 HC ICU INTERMEDIATE R&B

## 2017-12-20 PROCEDURE — 97530 THERAPEUTIC ACTIVITIES: CPT

## 2017-12-20 RX ORDER — ALBUTEROL SULFATE 2.5 MG/3ML
2.5 SOLUTION RESPIRATORY (INHALATION) EVERY 4 HOURS
Status: DISCONTINUED | OUTPATIENT
Start: 2017-12-20 | End: 2017-12-20

## 2017-12-20 RX ORDER — ALBUTEROL SULFATE 2.5 MG/3ML
2.5 SOLUTION RESPIRATORY (INHALATION) 4 TIMES DAILY
Status: DISCONTINUED | OUTPATIENT
Start: 2017-12-20 | End: 2017-12-21 | Stop reason: HOSPADM

## 2017-12-20 RX ADMIN — TIOTROPIUM BROMIDE 18 MCG: 18 CAPSULE ORAL; RESPIRATORY (INHALATION) at 10:32

## 2017-12-20 RX ADMIN — INSULIN LISPRO 3 UNITS: 100 INJECTION, SOLUTION INTRAVENOUS; SUBCUTANEOUS at 17:05

## 2017-12-20 RX ADMIN — PANTOPRAZOLE SODIUM 40 MG: 40 TABLET, DELAYED RELEASE ORAL at 16:50

## 2017-12-20 RX ADMIN — Medication 500 MG: at 21:44

## 2017-12-20 RX ADMIN — FUROSEMIDE 40 MG: 40 TABLET ORAL at 09:36

## 2017-12-20 RX ADMIN — CALCITRIOL 0.25 MCG: 0.25 CAPSULE, LIQUID FILLED ORAL at 12:18

## 2017-12-20 RX ADMIN — ALBUTEROL SULFATE 2.5 MG: 2.5 SOLUTION RESPIRATORY (INHALATION) at 10:32

## 2017-12-20 RX ADMIN — METOPROLOL TARTRATE 25 MG: 25 TABLET ORAL at 21:45

## 2017-12-20 RX ADMIN — MAGNESIUM OXIDE TAB 400 MG (241.3 MG ELEMENTAL MG) 400 MG: 400 (241.3 MG) TAB at 11:32

## 2017-12-20 RX ADMIN — CARBIDOPA AND LEVODOPA 1 TABLET: 25; 100 TABLET, EXTENDED RELEASE ORAL at 21:45

## 2017-12-20 RX ADMIN — AMIODARONE HYDROCHLORIDE 200 MG: 200 TABLET ORAL at 09:37

## 2017-12-20 RX ADMIN — MICONAZORB AF ANTIFUNGAL POWDER: 1.42 POWDER TOPICAL at 09:36

## 2017-12-20 RX ADMIN — ATORVASTATIN CALCIUM 20 MG: 20 TABLET, FILM COATED ORAL at 21:45

## 2017-12-20 RX ADMIN — INSULIN LISPRO 2 UNITS: 100 INJECTION, SOLUTION INTRAVENOUS; SUBCUTANEOUS at 21:52

## 2017-12-20 RX ADMIN — FERROUS SULFATE TAB EC 325 MG (65 MG FE EQUIVALENT) 325 MG: 325 (65 FE) TABLET DELAYED RESPONSE at 16:50

## 2017-12-20 RX ADMIN — HYDROCODONE BITARTRATE AND ACETAMINOPHEN 1 TABLET: 5; 325 TABLET ORAL at 11:32

## 2017-12-20 RX ADMIN — ALBUTEROL SULFATE 2.5 MG: 2.5 SOLUTION RESPIRATORY (INHALATION) at 16:09

## 2017-12-20 RX ADMIN — MOMETASONE FUROATE AND FORMOTEROL FUMARATE DIHYDRATE 2 PUFF: 200; 5 AEROSOL RESPIRATORY (INHALATION) at 19:46

## 2017-12-20 RX ADMIN — INSULIN LISPRO 2 UNITS: 100 INJECTION, SOLUTION INTRAVENOUS; SUBCUTANEOUS at 09:37

## 2017-12-20 RX ADMIN — Medication 500 MG: at 09:36

## 2017-12-20 RX ADMIN — METHYLPREDNISOLONE SODIUM SUCCINATE 40 MG: 40 INJECTION, POWDER, FOR SOLUTION INTRAMUSCULAR; INTRAVENOUS at 23:01

## 2017-12-20 RX ADMIN — HYDROCODONE BITARTRATE AND ACETAMINOPHEN 1 TABLET: 5; 325 TABLET ORAL at 21:47

## 2017-12-20 RX ADMIN — CYANOCOBALAMIN TAB 1000 MCG 1000 MCG: 1000 TAB at 09:36

## 2017-12-20 RX ADMIN — PANTOPRAZOLE SODIUM 40 MG: 40 TABLET, DELAYED RELEASE ORAL at 06:48

## 2017-12-20 RX ADMIN — CALCITRIOL 0.25 MCG: 0.25 CAPSULE, LIQUID FILLED ORAL at 09:36

## 2017-12-20 RX ADMIN — ROPINIROLE HYDROCHLORIDE 2 MG: 2 TABLET, FILM COATED ORAL at 12:18

## 2017-12-20 RX ADMIN — METHYLPREDNISOLONE SODIUM SUCCINATE 40 MG: 40 INJECTION, POWDER, FOR SOLUTION INTRAMUSCULAR; INTRAVENOUS at 16:50

## 2017-12-20 RX ADMIN — Medication 10 ML: at 09:37

## 2017-12-20 RX ADMIN — INSULIN LISPRO 2 UNITS: 100 INJECTION, SOLUTION INTRAVENOUS; SUBCUTANEOUS at 12:18

## 2017-12-20 RX ADMIN — CHOLECALCIFEROL CAP 125 MCG (5000 UNIT) 5000 UNITS: 125 CAP at 09:36

## 2017-12-20 RX ADMIN — FERROUS SULFATE TAB EC 325 MG (65 MG FE EQUIVALENT) 325 MG: 325 (65 FE) TABLET DELAYED RESPONSE at 09:37

## 2017-12-20 RX ADMIN — Medication 10 ML: at 23:02

## 2017-12-20 RX ADMIN — METHYLPREDNISOLONE SODIUM SUCCINATE 40 MG: 40 INJECTION, POWDER, FOR SOLUTION INTRAMUSCULAR; INTRAVENOUS at 09:37

## 2017-12-20 RX ADMIN — CARBIDOPA AND LEVODOPA 1 TABLET: 25; 100 TABLET, EXTENDED RELEASE ORAL at 09:36

## 2017-12-20 RX ADMIN — Medication 500 MG: at 12:18

## 2017-12-20 RX ADMIN — CALCITRIOL 0.25 MCG: 0.25 CAPSULE, LIQUID FILLED ORAL at 21:45

## 2017-12-20 RX ADMIN — OXYCODONE HYDROCHLORIDE AND ACETAMINOPHEN 500 MG: 500 TABLET ORAL at 09:36

## 2017-12-20 RX ADMIN — ROPINIROLE HYDROCHLORIDE 3 MG: 2 TABLET, FILM COATED ORAL at 09:36

## 2017-12-20 RX ADMIN — MOMETASONE FUROATE AND FORMOTEROL FUMARATE DIHYDRATE 2 PUFF: 200; 5 AEROSOL RESPIRATORY (INHALATION) at 10:32

## 2017-12-20 RX ADMIN — METOPROLOL TARTRATE 25 MG: 25 TABLET ORAL at 09:36

## 2017-12-20 RX ADMIN — CARBIDOPA AND LEVODOPA 1 TABLET: 25; 100 TABLET, EXTENDED RELEASE ORAL at 12:18

## 2017-12-20 RX ADMIN — CARBIDOPA AND LEVODOPA 1 TABLET: 25; 100 TABLET, EXTENDED RELEASE ORAL at 16:50

## 2017-12-20 RX ADMIN — RASAGILINE MESYLATE 1 MG: 0.5 TABLET ORAL at 09:36

## 2017-12-20 RX ADMIN — HYDROCODONE BITARTRATE AND ACETAMINOPHEN 1 TABLET: 5; 325 TABLET ORAL at 17:03

## 2017-12-20 RX ADMIN — ALBUTEROL SULFATE 2.5 MG: 2.5 SOLUTION RESPIRATORY (INHALATION) at 19:46

## 2017-12-20 RX ADMIN — OXYCODONE HYDROCHLORIDE AND ACETAMINOPHEN 500 MG: 500 TABLET ORAL at 21:45

## 2017-12-20 RX ADMIN — ROPINIROLE HYDROCHLORIDE 2 MG: 2 TABLET, FILM COATED ORAL at 21:45

## 2017-12-20 RX ADMIN — MICONAZORB AF ANTIFUNGAL POWDER: 1.42 POWDER TOPICAL at 21:44

## 2017-12-20 ASSESSMENT — PAIN SCALES - GENERAL
PAINLEVEL_OUTOF10: 8
PAINLEVEL_OUTOF10: 6
PAINLEVEL_OUTOF10: 3
PAINLEVEL_OUTOF10: 8
PAINLEVEL_OUTOF10: 5
PAINLEVEL_OUTOF10: 0
PAINLEVEL_OUTOF10: 5

## 2017-12-20 ASSESSMENT — PAIN DESCRIPTION - FREQUENCY
FREQUENCY: CONTINUOUS
FREQUENCY: CONTINUOUS

## 2017-12-20 ASSESSMENT — PAIN DESCRIPTION - PAIN TYPE
TYPE: ACUTE PAIN

## 2017-12-20 ASSESSMENT — PAIN DESCRIPTION - ORIENTATION
ORIENTATION: RIGHT
ORIENTATION: RIGHT

## 2017-12-20 ASSESSMENT — PAIN DESCRIPTION - LOCATION
LOCATION: FOOT
LOCATION: FOOT
LOCATION: GENERALIZED

## 2017-12-20 ASSESSMENT — PAIN DESCRIPTION - DESCRIPTORS
DESCRIPTORS: SHARP
DESCRIPTORS: ACHING

## 2017-12-20 NOTE — CONSULTS
pneumonitis ([facility]    Parkinson disease ([facility]    Syncope and collapse    Gastrointestinal hemorrhage    Acute renal failure (HCC)    Acute cystitis without hematuria    Mental status change    Iron deficiency anemia due to chronic blood loss    Morbid obesity with BMI of 40.0-44.9, adult ([facility]    HCAP (healthcare-associated pneumonia)    Acute respiratory failure with hypercapnia (HCC)    COPD with exacerbation (HCC)    Acute on chronic diastolic congestive heart failure (HCC)    Hyperplastic polyp of intestine    Diverticulosis    Panlobular emphysema (HCC)    Rapid atrial fibrillation (HCC)    Atrial fibrillation with RVR (HCC)    Stage 3 chronic kidney disease    KRISTIN (acute kidney injury) ([facility]    Acute on chronic respiratory failure with hypoxia and hypercapnia ([facility]       ELIZABETH Poon is 81 y.o.,  female, admitted after experiencing syncopal episodes. She has a history of COPD, with 30-pack-year smoking history quitting approximately 40 years ago. She has a history of obstructive sleep apnea and had been compliant on her home CPAP machine until recently after she had fallen and suffered injury to her face/forehead she has not been able to wear her mask. She denies shortness of breath. She denies cough, chest pain, fever, or chills. PMHx   Past Medical History      Diagnosis Date    Acute on chronic diastolic congestive heart failure (HCC)     Asthma     Atrial fibrillation (HCC)     Cataracts, bilateral     Cerebral artery occlusion with cerebral infarction [facility])     Last stroke was February 2017 w/no deficits    Chronic kidney disease     Constipation     COPD (chronic obstructive pulmonary disease) ([facility]     PT. SEES DR. BECK    Diverticulosis     DM2 (diabetes mellitus, type 2) ([facility]     Full dentures     GERD (gastroesophageal reflux disease)     Headache(784.0)     Hyperlipidemia     Hyperplastic polyp of intestine     Hypertension     PT.  DOES Relation Age of Onset    Heart Failure Mother     Hypertension Mother     Heart Disease Mother     High Blood Pressure Mother     Asthma Other      ROS - 11 systems   General Denies any fever or chills  HEENT Denies any diplopia, tinnitus or vertigo  Resp Denies any shortness of breath, cough or wheezing  Cardiac Denies any chest pain, palpitations, claudication or edema  GI Denies any melena, hematochezia, hematemesis or pyrosis   Denies any frequency, urgency, hesitancy or incontinence  Heme Denies bruising or bleeding easily  Endocrine Denies any history of diabetes or thyroid disease  Neuro Denies any focal motor or sensory deficits  Psychiatric Denies anxiety, depression, suicidal ideation  Skin Denies rashes, itching, open sores    Vitals    /88   Pulse 70   Temp 98.2 °F (36.8 °C) (Oral)   Resp 16   Ht 5' 1\" (1.549 m)   Wt 179 lb 1.6 oz (81.2 kg)   SpO2 98%   BMI 33.84 kg/m²   Body mass index is 33.84 kg/m². I/O      Intake/Output Summary (Last 24 hours) at 12/20/17 1427  Last data filed at 12/20/17 0741   Gross per 24 hour   Intake              720 ml   Output              850 ml   Net             -130 ml     I/O last 3 completed shifts: In: 958 [P.O.:958]  Out: 1850 [Urine:1850]   Patient Vitals for the past 96 hrs (Last 3 readings):   Weight   12/20/17 0714 179 lb 1.6 oz (81.2 kg)   12/18/17 0816 191 lb 11.2 oz (87 kg)     Exam   General Appearance Awake, alert, oriented, in no acute distress  HEENT - Head is normocephalic, atraumatic. Pupil reactive to light  Neck - Supple, symmetrical, trachea midline and Soft, trachea midline and straight  Lungs - Decreased air exchange; no rales, wheeze, or ronchi  Cardiovascular - Heart sounds are normal.  Regular rhythm normal rate without murmur, gallop or rub. Abdomen - Soft, nontender, nondistended, no masses or organomegaly  Neurologic - CN II-XII are grossly intact.  There are no focal motor or sensory deficits  Skin - No bruising or bleeding  Extremities - No cyanosis, clubbing or edema    Labs  - Old records and notes have been reviewed in [facility]   CBC   Lab Results   Component Value Date    WBC 9.3 12/20/2017    RBC 2.85 12/20/2017    HGB 8.3 12/20/2017    HCT 26.9 12/20/2017     12/20/2017    MCV 94.6 12/20/2017    MCH 29.2 12/20/2017    MCHC 30.9 12/20/2017    RDW 16.6 12/20/2017    LYMPHOPCT 13 12/16/2017    MONOPCT 7 12/16/2017    BASOPCT 0 12/16/2017    MONOSABS 0.70 12/16/2017    LYMPHSABS 1.20 12/16/2017    EOSABS 0.10 12/16/2017    BASOSABS 0.00 12/16/2017    DIFFTYPE NOT REPORTED 12/16/2017     BMP Lab Results   Component Value Date     12/20/2017    K 4.5 12/20/2017    CL 91 12/20/2017    CO2 36 12/20/2017    BUN 19 12/20/2017    CREATININE 1.22 12/20/2017    GLUCOSE 227 12/20/2017    GLUCOSE 132 03/07/2012    CALCIUM 7.0 12/20/2017    MG 2.0 12/17/2017     LFTS  Lab Results   Component Value Date    ALKPHOS 49 12/17/2017    ALT <5 12/17/2017    AST 18 12/17/2017    PROT 6.5 12/17/2017    BILITOT 0.25 12/17/2017    BILIDIR <0.08 02/18/2017    IBILI CANNOT BE CALCULATED 02/18/2017    LABALBU 3.7 12/17/2017     PTT  Lab Results   Component Value Date    APTT 29.2 12/16/2017     INR   Lab Results   Component Value Date    INR 1.1 12/16/2017    INR 1.0 09/03/2017    INR 1.0 08/25/2017    PROTIME 11.0 12/16/2017    PROTIME 10.0 09/03/2017    PROTIME 10.3 08/25/2017       Radiology    CXR  12/17/17       CT Scans  12/16/17    (See actual reports for details)    \"Thank you for asking us to see this patient\"    Case discussed with nurse and patient. Questions and concerns addressed.     Electronically signed by     Rick Treviño CNP on 12/20/2017 at 2:27 PM  Patient was seen under the supervision of Dr. Mahogany Ramirez and Sleep Medicine,    [facility] [place]: [phone]

## 2017-12-20 NOTE — PROGRESS NOTES
Patient called out stating \"there is blood all over\". Checked on patient, found IV out and laying on bed. Blood on patient arm, leg. Patient linen changed, hygiene performed. Patient repositioned. IV access restarted.

## 2017-12-20 NOTE — PROGRESS NOTES
headache    Medications: Allergies: Allergies   Allergen Reactions    Dye [Barium-Containing Compounds] Other (See Comments)     Cause Afib per     Pcn [Penicillins] Itching and Swelling    Red Dye Itching and Rash     This allergy is likely incorrect:  Per patient's  she has no issue with medications that have red dye in them or red food. He thinks this reaction was added to chart when the pt had a colonoscopy (possibly barium or iodine dye instead)       Current Meds:   Scheduled Meds:    methylPREDNISolone  40 mg Intravenous Q8H    furosemide  40 mg Oral Daily    vitamin C  500 mg Oral BID    ferrous sulfate  325 mg Oral BID WC    amiodarone  200 mg Oral Daily    atorvastatin  20 mg Oral Daily    rasagiline  1 mg Oral Daily    calcitRIOL  0.25 mcg Oral TID    metoprolol tartrate  25 mg Oral BID    miconazole   Topical BID    mometasone-formoterol  2 puff Inhalation BID    pantoprazole  40 mg Oral BID AC    rOPINIRole  2 mg Oral 2 times per day    cyanocobalamin  1,000 mcg Oral Daily    sodium chloride flush  10 mL Intravenous 2 times per day    insulin lispro  0-6 Units Subcutaneous TID WC    insulin lispro  0-3 Units Subcutaneous Nightly    rOPINIRole  3 mg Oral QAM    carbidopa-levodopa  1 tablet Oral 4x Daily    tiotropium  18 mcg Inhalation Daily    calcium carbonate  500 mg Oral TID    sodium chloride  250 mL Intravenous Once    vitamin D  5,000 Units Oral Daily    magnesium oxide  400 mg Oral Daily     Continuous Infusions:    dextrose       PRN Meds: albuterol, sodium chloride flush, acetaminophen, HYDROcodone 5 mg - acetaminophen, morphine **OR** morphine, magnesium hydroxide, bisacodyl, ondansetron, nicotine, glucose, dextrose, glucagon (rDNA), dextrose    Data:     Past Medical History:   has a past medical history of Acute on chronic diastolic congestive heart failure ([address] Asthma; Atrial fibrillation ([address]  Cataracts, bilateral; Cerebral artery occlusion with cerebral infarction ([facility] Chronic kidney disease; Constipation; COPD (chronic obstructive pulmonary disease) ([facility] Diverticulosis; DM2 (diabetes mellitus, type 2) ([facility] Full dentures; GERD (gastroesophageal reflux disease); Headache(784.0); Hyperlipidemia; Hyperplastic polyp of intestine; Hypertension; Morbid obesity with BMI of 40.0-44.9, adult ([facility] ECTOR on CPAP; Osteoarthritis; Parkinson disease ([facility] Spinal stenosis in cervical region; Type II or unspecified type diabetes mellitus without mention of complication, not stated as uncontrolled; Unspecified sleep apnea; and Wears glasses. Social History:   reports that she quit smoking about 37 years ago. Her smoking use included Cigarettes. She has a 30.00 pack-year smoking history. She has never used smokeless tobacco. She reports that she drinks about 1.2 oz of alcohol per week . She reports that she uses drugs, including Marijuana. Family History:   Family History   Problem Relation Age of Onset    Heart Failure Mother     Hypertension Mother     Heart Disease Mother     High Blood Pressure Mother     Asthma Other        Vitals:  BP (!) 116/43   Pulse 81   Temp 98.1 °F (36.7 °C) (Oral)   Resp 16   Ht 5' 1\" (1.549 m)   Wt 179 lb 1.6 oz (81.2 kg)   SpO2 92%   BMI 33.84 kg/m²   Temp (24hrs), Av.7 °F (37.1 °C), Min:98.1 °F (36.7 °C), Max:99.5 °F (37.5 °C)    Recent Labs      17   1158  17   1701  17   2049  17   0835   POCGLU  218*  190*  149*  247*       I/O (24Hr):     Intake/Output Summary (Last 24 hours) at 17 1049  Last data filed at 17 0741   Gross per 24 hour   Intake             1078 ml   Output             1850 ml   Net             -772 ml       Labs:    Hematology:  Recent Labs      17   0541  17   0545  17   0518   WBC  9.5  9.8  9.3   RBC  2.50*  2.57*  2.85*   HGB  7.4*  7.8*  8.3*   HCT  23.5*  24.0*  26.9*   MCV  94.3  93.4  94.6   MCH  29.8  30.4  29.2

## 2017-12-20 NOTE — PROGRESS NOTES
addressed prior to ending therapy session. Restrictions  Restrictions/Precautions  Restrictions/Precautions: Fall Risk, General Precautions (2 L O2 per NC, O2 sats decrease with any movement, pt is impulsive )  Subjective   General  Chart Reviewed: Yes  Patient assessed for rehabilitation services?: Yes  Family / Caregiver Present: Yes (spouse )  Pre Treatment Pain Screening  Intervention List: Patient able to continue with treatment;Nurse/Physician notified  Pain Assessment  Patient Currently in Pain: Yes  Pain Assessment: 0-10  Pain Level: 8  Pain Type: Acute pain  Pain Location:  (pt states her BLES, head and back )  Pain Descriptors: Sharp  Pain Frequency: Continuous  Vital Signs  Patient Currently in Pain: Yes   Orientation     Objective    ADL  Grooming: Minimal assistance (for hair care with set up due to tangles )  Toileting:  Moderate assistance (with gown mgt; pt was able to stand and complete front hygiene with CG needed for balance and verbal instruction for hand placement on grab bar to assist )        Balance  Sitting Balance: Stand by assistance (with air mattress deflated )  Standing Balance: Contact guard assistance  Standing Balance  Time: stand wally 30 seconds with RW   Sit to stand: Minimal assistance  Stand to sit: Minimal assistance  Comment: O2 sats decrease with any activity 79% and increase to back in 90's with increased time and pursed lip breathing/seated rest break   Functional Mobility-CG of one with SW and with 2nd assist for safety and to manage lines   Functional - Mobility Device: Standard Walker (pt could benefit from RW use to conserve energy however one not present in room )  Activity: To/from bathroom  Functional Mobility Comments: Pt needed verbal instruction to slow down movements, upright posture, pursed lip breathing, awarenesss and assist for O2 lines; pt needed one standing rest break to make into bathroom to urinate   Toilet Transfers  Equipment Used: Grab bars  Toilet Transfer: Minimal assistance  Toilet Transfers Comments: verbal instruction for hand placement on grab bar and O2 cord awareness/assist to manage   Bed mobility  Supine to Sit: Stand by assistance  Comment: verbal instruction to not hold breath and hand placement on grab bar   Transfers  Stand Pivot Transfers: Minimal assistance (min to CG depending on surface )  Sit to stand: Minimal assistance  Stand to sit: Minimal assistance  Transfer Comments: Pt needed max verbal instruction/demonstration for B hand placement, upright posture, slowing down movements due to impulsive, awareness of O2 lines, proper use of SW position                                                                 Assessment   Performance deficits / Impairments: Decreased functional mobility ; Decreased ADL status; Decreased safe awareness;Decreased cognition;Decreased endurance;Decreased balance  Prognosis: Fair  Patient Education: safety awareness in function, pursed lip breathing, EC/WS tech, benefits of self care participation to improve I, SW safety, upright posture and weight shifting   Discharge Recommendations: Subacute/Skilled Nursing Facility  REQUIRES OT FOLLOW UP: Yes  Activity Tolerance  Activity Tolerance: Patient limited by fatigue;Patient limited by pain;Treatment limited secondary to decreased cognition  Activity Tolerance: poor        Discharge Recommendations:  [address] [place] [address]  Times per week: 4-5x/week, 1-2x/week  Current Treatment Recommendations: Strengthening, Balance Training, Functional Mobility Training, Endurance Training, Self-Care / ADL, Safety Education & Training, Patient/Caregiver Education & Training, Equipment Evaluation, Education, & procurement, Positioning  G-Code     OutComes Score                                           AM-PAC Score             Goals  Short term goals  Time Frame for Short term goals: by discharge, pt will  Short term goal 1: demo CGA with ADL transfers with good safety  Short term goal 2: demo CGA with functional mob for ADL completion in room with good safety and pacing  Short term goal 3: demo min A for toileting routine at approp level  Short term goal 4: demo min A UB ADLs and mod A LB ADLs with approp DME and breathing techs  Short term goal 5: verb and demo good understanding of fall prevention techs and EC/WS techs along with equip needs for home  Patient Goals   Patient goals : to go to facility prior to going home       Therapy Time   Individual Concurrent Group Co-treatment   Time In 0946         Time Out 1016         Minutes [address]

## 2017-12-20 NOTE — PROGRESS NOTES
chloride bolus  250 mL Intravenous Once Vinicio Elijah Blood, DO        vitamin D (CHOLECALCIFEROL) capsule 5,000 Units  5,000 Units Oral Daily Jose HO Blood, DO   5,000 Units at 12/19/17 0845    magnesium oxide (MAG-OX) tablet 400 mg  400 mg Oral Daily Jose HO Blood, DO   400 mg at 12/19/17 1021     Allergies   Allergen Reactions    Dye [Barium-Containing Compounds] Other (See Comments)     Cause Afib per     Pcn [Penicillins] Itching and Swelling    Red Dye Itching and Rash     This allergy is likely incorrect:  Per patient's  she has no issue with medications that have red dye in them or red food. He thinks this reaction was added to chart when the pt had a colonoscopy (possibly barium or iodine dye instead)     Principal Problem:    KRISTIN (acute kidney injury) ([facility]  Active Problems:    Controlled type 2 diabetes mellitus with stage 3 chronic kidney disease, without long-term current use of insulin (HCC)    Essential hypertension    Gastroesophageal reflux disease without esophagitis    ECTOR on CPAP    Parkinson disease ([facility])    Iron deficiency anemia due to chronic blood loss    Panlobular emphysema ([facility])    Stage 3 chronic kidney disease    Acute on chronic respiratory failure with hypoxia and hypercapnia ([facility])    Blood pressure (!) 116/43, pulse 81, temperature 98.1 °F (36.7 °C), temperature source Oral, resp. rate 16, height 5' 1\" (1.549 m), weight 179 lb 1.6 oz (81.2 kg), SpO2 93 %, not currently breastfeeding. Subjective:  Symptoms:  Improved. Objective:  General Appearance:  Comfortable. Vital signs: (most recent): Blood pressure (!) 116/43, pulse 81, temperature 98.1 °F (36.7 °C), temperature source Oral, resp. rate 16, height 5' 1\" (1.549 m), weight 179 lb 1.6 oz (81.2 kg), SpO2 93 %, not currently breastfeeding. Vital signs are normal.    Lungs:  Normal effort. Heart: Normal rate. Neurological: (Much more awake, alert, and responsive. ). Assessment:  (Push PT.). Miryam Turpin MD  12/20/2017

## 2017-12-20 NOTE — PROGRESS NOTES
medical complications (free text); Patient limited by endurance   Plan   Times per week 1-2 times a day, 5-6 days a week   Current Treatment Recommendations Strengthening;Balance Training;Functional Mobility Training;Transfer Training; Endurance Training;Gait Training;Stair training;Home Exercise Program;Safety Education & Training   Deep Lieberman PTA     Time in: 940 Time out: 1011

## 2017-12-20 NOTE — PLAN OF CARE
Problem: Risk for Impaired Skin Integrity  Goal: Tissue integrity - skin and mucous membranes  Structural intactness and normal physiological function of skin and  mucous membranes. Outcome: Ongoing  Patient skin has been assessed this shift, no signs of new skin breakdown have been noted. Patient has pressure relief overlay mattress on bed, mattress is inflated. Patient is able to turn from side to side and is reminded to change positions frequently. Problem: Gas Exchange - Impaired:  Goal: Levels of oxygenation will improve  Levels of oxygenation will improve   Outcome: Ongoing  Patient has continuous pulse oximetry monitor in use. Patient has maintained SpO2 % between 89-93. Patient wore Bi-Pap for part of the night, asked for mask to be removed at 3 am. Patient is being monitored during hourly rounds.

## 2017-12-21 VITALS
BODY MASS INDEX: 34.11 KG/M2 | OXYGEN SATURATION: 98 % | SYSTOLIC BLOOD PRESSURE: 118 MMHG | RESPIRATION RATE: 18 BRPM | WEIGHT: 180.7 LBS | DIASTOLIC BLOOD PRESSURE: 43 MMHG | HEART RATE: 70 BPM | HEIGHT: 61 IN | TEMPERATURE: 98.2 F

## 2017-12-21 LAB
ANION GAP SERPL CALCULATED.3IONS-SCNC: 11 MMOL/L (ref 9–17)
BUN BLDV-MCNC: 29 MG/DL (ref 8–23)
BUN/CREAT BLD: 19 (ref 9–20)
CALCIUM SERPL-MCNC: 7.1 MG/DL (ref 8.6–10.4)
CHLORIDE BLD-SCNC: 91 MMOL/L (ref 98–107)
CO2: 39 MMOL/L (ref 20–31)
CREAT SERPL-MCNC: 1.51 MG/DL (ref 0.5–0.9)
GFR AFRICAN AMERICAN: 41 ML/MIN
GFR NON-AFRICAN AMERICAN: 34 ML/MIN
GFR SERPL CREATININE-BSD FRML MDRD: ABNORMAL ML/MIN/{1.73_M2}
GFR SERPL CREATININE-BSD FRML MDRD: ABNORMAL ML/MIN/{1.73_M2}
GLUCOSE BLD-MCNC: 209 MG/DL (ref 65–105)
GLUCOSE BLD-MCNC: 226 MG/DL (ref 70–99)
GLUCOSE BLD-MCNC: 260 MG/DL (ref 65–105)
HCT VFR BLD CALC: 24.5 % (ref 36–46)
HEMOGLOBIN: 7.9 G/DL (ref 12–16)
MCH RBC QN AUTO: 30.2 PG (ref 26–34)
MCHC RBC AUTO-ENTMCNC: 32.3 G/DL (ref 31–37)
MCV RBC AUTO: 93.6 FL (ref 80–100)
PDW BLD-RTO: 17.2 % (ref 11.5–14.5)
PLATELET # BLD: 167 K/UL (ref 130–400)
PMV BLD AUTO: ABNORMAL FL (ref 6–12)
POTASSIUM SERPL-SCNC: 4.1 MMOL/L (ref 3.7–5.3)
RBC # BLD: 2.61 M/UL (ref 4–5.2)
SODIUM BLD-SCNC: 141 MMOL/L (ref 135–144)
WBC # BLD: 13.6 K/UL (ref 3.5–11)

## 2017-12-21 PROCEDURE — 6360000002 HC RX W HCPCS: Performed by: INTERNAL MEDICINE

## 2017-12-21 PROCEDURE — 6370000000 HC RX 637 (ALT 250 FOR IP): Performed by: NURSE PRACTITIONER

## 2017-12-21 PROCEDURE — 94660 CPAP INITIATION&MGMT: CPT

## 2017-12-21 PROCEDURE — 82947 ASSAY GLUCOSE BLOOD QUANT: CPT

## 2017-12-21 PROCEDURE — 85027 COMPLETE CBC AUTOMATED: CPT

## 2017-12-21 PROCEDURE — 99239 HOSP IP/OBS DSCHRG MGMT >30: CPT | Performed by: INTERNAL MEDICINE

## 2017-12-21 PROCEDURE — 6370000000 HC RX 637 (ALT 250 FOR IP): Performed by: INTERNAL MEDICINE

## 2017-12-21 PROCEDURE — 6360000002 HC RX W HCPCS: Performed by: NURSE PRACTITIONER

## 2017-12-21 PROCEDURE — 2580000003 HC RX 258: Performed by: INTERNAL MEDICINE

## 2017-12-21 PROCEDURE — 94640 AIRWAY INHALATION TREATMENT: CPT

## 2017-12-21 PROCEDURE — 2700000000 HC OXYGEN THERAPY PER DAY

## 2017-12-21 PROCEDURE — 80048 BASIC METABOLIC PNL TOTAL CA: CPT

## 2017-12-21 PROCEDURE — 94761 N-INVAS EAR/PLS OXIMETRY MLT: CPT

## 2017-12-21 PROCEDURE — 36415 COLL VENOUS BLD VENIPUNCTURE: CPT

## 2017-12-21 RX ORDER — ONDANSETRON 2 MG/ML
4 INJECTION INTRAMUSCULAR; INTRAVENOUS EVERY 6 HOURS PRN
Qty: 15 ML | Status: ON HOLD | DISCHARGE
Start: 2017-12-21 | End: 2018-03-10 | Stop reason: HOSPADM

## 2017-12-21 RX ORDER — LANOLIN ALCOHOL/MO/W.PET/CERES
325 CREAM (GRAM) TOPICAL 2 TIMES DAILY WITH MEALS
Qty: 90 TABLET | Refills: 3 | DISCHARGE
Start: 2017-12-21

## 2017-12-21 RX ORDER — ASCORBIC ACID 500 MG
500 TABLET ORAL 2 TIMES DAILY
Qty: 30 TABLET | Refills: 3 | Status: ON HOLD | DISCHARGE
Start: 2017-12-21 | End: 2018-03-10 | Stop reason: HOSPADM

## 2017-12-21 RX ORDER — LANOLIN ALCOHOL/MO/W.PET/CERES
3 CREAM (GRAM) TOPICAL NIGHTLY PRN
Status: DISCONTINUED | OUTPATIENT
Start: 2017-12-21 | End: 2017-12-21 | Stop reason: HOSPADM

## 2017-12-21 RX ORDER — PREDNISONE 20 MG/1
20 TABLET ORAL 2 TIMES DAILY
Status: DISCONTINUED | OUTPATIENT
Start: 2017-12-21 | End: 2017-12-21 | Stop reason: HOSPADM

## 2017-12-21 RX ORDER — PREDNISONE 10 MG/1
TABLET ORAL
Qty: 18 TABLET | Refills: 0 | Status: ON HOLD | DISCHARGE
Start: 2017-12-21 | End: 2018-03-08 | Stop reason: ALTCHOICE

## 2017-12-21 RX ORDER — MORPHINE SULFATE 2 MG/ML
2 INJECTION, SOLUTION INTRAMUSCULAR; INTRAVENOUS
Qty: 52.5 ML | Status: SHIPPED | OUTPATIENT
Start: 2017-12-21 | End: 2018-01-22

## 2017-12-21 RX ORDER — LANOLIN ALCOHOL/MO/W.PET/CERES
3 CREAM (GRAM) TOPICAL NIGHTLY PRN
Status: DISCONTINUED | OUTPATIENT
Start: 2017-12-21 | End: 2017-12-21

## 2017-12-21 RX ORDER — ACETAMINOPHEN 325 MG/1
650 TABLET ORAL EVERY 4 HOURS PRN
Qty: 120 TABLET | Refills: 3 | Status: ON HOLD | DISCHARGE
Start: 2017-12-21 | End: 2018-03-10 | Stop reason: HOSPADM

## 2017-12-21 RX ADMIN — CALCITRIOL 0.25 MCG: 0.25 CAPSULE, LIQUID FILLED ORAL at 09:01

## 2017-12-21 RX ADMIN — FUROSEMIDE 40 MG: 40 TABLET ORAL at 09:00

## 2017-12-21 RX ADMIN — HYDROCODONE BITARTRATE AND ACETAMINOPHEN 1 TABLET: 5; 325 TABLET ORAL at 11:44

## 2017-12-21 RX ADMIN — RASAGILINE MESYLATE 1 MG: 0.5 TABLET ORAL at 09:00

## 2017-12-21 RX ADMIN — PANTOPRAZOLE SODIUM 40 MG: 40 TABLET, DELAYED RELEASE ORAL at 09:01

## 2017-12-21 RX ADMIN — CARBIDOPA AND LEVODOPA 1 TABLET: 25; 100 TABLET, EXTENDED RELEASE ORAL at 09:04

## 2017-12-21 RX ADMIN — INSULIN LISPRO 2 UNITS: 100 INJECTION, SOLUTION INTRAVENOUS; SUBCUTANEOUS at 09:01

## 2017-12-21 RX ADMIN — CHOLECALCIFEROL CAP 125 MCG (5000 UNIT) 5000 UNITS: 125 CAP at 09:00

## 2017-12-21 RX ADMIN — HYDROCODONE BITARTRATE AND ACETAMINOPHEN 1 TABLET: 5; 325 TABLET ORAL at 03:26

## 2017-12-21 RX ADMIN — CARBIDOPA AND LEVODOPA 1 TABLET: 25; 100 TABLET, EXTENDED RELEASE ORAL at 12:16

## 2017-12-21 RX ADMIN — MICONAZORB AF ANTIFUNGAL POWDER: 1.42 POWDER TOPICAL at 09:01

## 2017-12-21 RX ADMIN — ALBUTEROL SULFATE 2.5 MG: 2.5 SOLUTION RESPIRATORY (INHALATION) at 07:48

## 2017-12-21 RX ADMIN — AMIODARONE HYDROCHLORIDE 200 MG: 200 TABLET ORAL at 09:01

## 2017-12-21 RX ADMIN — CYANOCOBALAMIN TAB 1000 MCG 1000 MCG: 1000 TAB at 09:01

## 2017-12-21 RX ADMIN — ROPINIROLE HYDROCHLORIDE 3 MG: 2 TABLET, FILM COATED ORAL at 09:01

## 2017-12-21 RX ADMIN — METHYLPREDNISOLONE SODIUM SUCCINATE 40 MG: 40 INJECTION, POWDER, FOR SOLUTION INTRAMUSCULAR; INTRAVENOUS at 09:02

## 2017-12-21 RX ADMIN — PREDNISONE 20 MG: 20 TABLET ORAL at 12:16

## 2017-12-21 RX ADMIN — OXYCODONE HYDROCHLORIDE AND ACETAMINOPHEN 500 MG: 500 TABLET ORAL at 09:01

## 2017-12-21 RX ADMIN — MELATONIN TAB 3 MG 3 MG: 3 TAB at 02:51

## 2017-12-21 RX ADMIN — ALBUTEROL SULFATE 2.5 MG: 2.5 SOLUTION RESPIRATORY (INHALATION) at 11:11

## 2017-12-21 RX ADMIN — Medication 10 ML: at 09:05

## 2017-12-21 RX ADMIN — METOPROLOL TARTRATE 25 MG: 25 TABLET ORAL at 09:00

## 2017-12-21 RX ADMIN — MAGNESIUM OXIDE TAB 400 MG (241.3 MG ELEMENTAL MG) 400 MG: 400 (241.3 MG) TAB at 09:00

## 2017-12-21 RX ADMIN — MOMETASONE FUROATE AND FORMOTEROL FUMARATE DIHYDRATE 2 PUFF: 200; 5 AEROSOL RESPIRATORY (INHALATION) at 07:48

## 2017-12-21 RX ADMIN — Medication 500 MG: at 09:00

## 2017-12-21 RX ADMIN — INSULIN LISPRO 3 UNITS: 100 INJECTION, SOLUTION INTRAVENOUS; SUBCUTANEOUS at 12:15

## 2017-12-21 RX ADMIN — TIOTROPIUM BROMIDE 18 MCG: 18 CAPSULE ORAL; RESPIRATORY (INHALATION) at 07:48

## 2017-12-21 RX ADMIN — FERROUS SULFATE TAB EC 325 MG (65 MG FE EQUIVALENT) 325 MG: 325 (65 FE) TABLET DELAYED RESPONSE at 09:04

## 2017-12-21 ASSESSMENT — PAIN SCALES - GENERAL
PAINLEVEL_OUTOF10: 4
PAINLEVEL_OUTOF10: 0
PAINLEVEL_OUTOF10: 0
PAINLEVEL_OUTOF10: 6
PAINLEVEL_OUTOF10: 0

## 2017-12-21 ASSESSMENT — PAIN DESCRIPTION - DESCRIPTORS: DESCRIPTORS: ACHING

## 2017-12-21 ASSESSMENT — PAIN DESCRIPTION - ORIENTATION: ORIENTATION: RIGHT

## 2017-12-21 ASSESSMENT — PAIN DESCRIPTION - LOCATION: LOCATION: FOOT;ANKLE

## 2017-12-21 ASSESSMENT — PAIN DESCRIPTION - PAIN TYPE: TYPE: ACUTE PAIN

## 2017-12-21 NOTE — PROGRESS NOTES
[id number] [facility]    Progress Note    12/21/2017    10:44 AM    Name:   Katie Smith  MRN:     [id number]     Acct:      [de-identified]   Room:   [address] [place] [address] Day:  5  Admit Date:  12/16/2017  8:10 AM    PCP:   Ashish Oropeza DO  Code Status:  Full Code    Subjective:     C/C:   Chief Complaint   Patient presents with    Loss of Consciousness     Interval History Status: improved. Less sob  Less sore from her fall  Still using bipap intermittently    Brief History:     The patient is a 81 y. o.   female who presents with Loss of Consciousness   and she is admitted to the hospital for the management of  Passing out.  She states she was on the couch and not long after getting up, had sudden loc.  No recollection of event.  No preceding symptoms of dizziness, etc.     I spoke to  and he was making coffee in the other room at the time. Isiah Call heard a thud and ran into the other room-found her passed out on floor, unresponsive.  She was \"shaking\"-he describes her body being rigid with her \"arms and legs moving back and forth. \"  She was unarousable for approx 15 min until she was in ambulance. Isiah Call also states she has had balance issues last few days and several falls     She was found to be quite anemic and received transfusion     Has had extensive gi workup in past-egd 12/28/16-minimal changes (gastritis)  Colonoscopy x 2 dec 2016 (severe tics), repeat with polypectomy 4/2017     Has developed worsening pulm status frequently desatting to 60-70s and requiring bipap intermittently    Review of Systems:     Constitutional:  negative for chills, fevers, sweats  Respiratory:  positive for cough, dyspnea on exertion, shortness of breath, wheezing  Cardiovascular:  negative for chest pain, chest pressure/discomfort, lower extremity edema, palpitations  Gastrointestinal:  negative for abdominal pain, constipation, diarrhea, nausea, vomiting  Neurological:  negative for dizziness, headache    Medications: Allergies: Allergies   Allergen Reactions    Dye [Barium-Containing Compounds] Other (See Comments)     Cause Afib per     Pcn [Penicillins] Itching and Swelling    Red Dye Itching and Rash     This allergy is likely incorrect:  Per patient's  she has no issue with medications that have red dye in them or red food.  He thinks this reaction was added to chart when the pt had a colonoscopy (possibly barium or iodine dye instead)       Current Meds:   Scheduled Meds:    predniSONE  20 mg Oral BID    albuterol  2.5 mg Nebulization 4x daily    furosemide  40 mg Oral Daily    vitamin C  500 mg Oral BID    ferrous sulfate  325 mg Oral BID WC    amiodarone  200 mg Oral Daily    atorvastatin  20 mg Oral Daily    rasagiline  1 mg Oral Daily    calcitRIOL  0.25 mcg Oral TID    metoprolol tartrate  25 mg Oral BID    miconazole   Topical BID    mometasone-formoterol  2 puff Inhalation BID    pantoprazole  40 mg Oral BID AC    rOPINIRole  2 mg Oral 2 times per day    cyanocobalamin  1,000 mcg Oral Daily    sodium chloride flush  10 mL Intravenous 2 times per day    insulin lispro  0-6 Units Subcutaneous TID WC    insulin lispro  0-3 Units Subcutaneous Nightly    rOPINIRole  3 mg Oral QAM    carbidopa-levodopa  1 tablet Oral 4x Daily    tiotropium  18 mcg Inhalation Daily    calcium carbonate  500 mg Oral TID    sodium chloride  250 mL Intravenous Once    vitamin D  5,000 Units Oral Daily    magnesium oxide  400 mg Oral Daily     Continuous Infusions:    dextrose       PRN Meds: melatonin, albuterol, sodium chloride flush, acetaminophen, HYDROcodone 5 mg - acetaminophen, morphine **OR** morphine, magnesium hydroxide, bisacodyl, ondansetron, nicotine, glucose, dextrose, glucagon (rDNA), dextrose    Data:     Past Medical History:   has a past medical history of Acute on chronic diastolic congestive heart failure ([facility] Asthma; Atrial fibrillation ([facility] Cataracts, bilateral; Cerebral artery occlusion with cerebral infarction ([facility] Chronic kidney disease; Constipation; COPD (chronic obstructive pulmonary disease) ([facility] Diverticulosis; DM2 (diabetes mellitus, type 2) ([facility] Full dentures; GERD (gastroesophageal reflux disease); Headache(784.0); Hyperlipidemia; Hyperplastic polyp of intestine; Hypertension; Morbid obesity with BMI of 40.0-44.9, adult ([facility] ECTOR on CPAP; Osteoarthritis; Parkinson disease ([facility] Spinal stenosis in cervical region; Type II or unspecified type diabetes mellitus without mention of complication, not stated as uncontrolled; Unspecified sleep apnea; and Wears glasses. Social History:   reports that she quit smoking about 37 years ago. Her smoking use included Cigarettes. She has a 30.00 pack-year smoking history. She has never used smokeless tobacco. She reports that she drinks about 1.2 oz of alcohol per week . She reports that she uses drugs, including Marijuana. Family History:   Family History   Problem Relation Age of Onset    Heart Failure Mother     Hypertension Mother     Heart Disease Mother     High Blood Pressure Mother     Asthma Other        Vitals:  BP (!) 137/56   Pulse 78   Temp 97.9 °F (36.6 °C) (Oral)   Resp 20   Ht 5' 1\" (1.549 m)   Wt 180 lb 11.2 oz (82 kg)   SpO2 98%   BMI 34.14 kg/m²   Temp (24hrs), Av.3 °F (36.8 °C), Min:97.9 °F (36.6 °C), Max:98.8 °F (37.1 °C)    Recent Labs      17   1203  17   1706  17   2034  17   0759   POCGLU  233*  280*  312*  209*       I/O (24Hr):     Intake/Output Summary (Last 24 hours) at 17 1044  Last data filed at 17 0904   Gross per 24 hour   Intake              850 ml   Output              750 ml   Net              100 ml       Labs:    Hematology:  Recent Labs      17   0545  17   0518  17   0557   WBC  9.8  9.3  13.6*   RBC  2.57*  2.85*  2.61*   HGB  7.8*

## 2017-12-21 NOTE — PROGRESS NOTES
mcg Oral Daily Jose P Blood, DO   1,000 mcg at 12/21/17 0901    sodium chloride flush 0.9 % injection 10 mL  10 mL Intravenous 2 times per day Jose P Blood, DO   10 mL at 12/21/17 0905    sodium chloride flush 0.9 % injection 10 mL  10 mL Intravenous PRN Jose P Blood, DO        acetaminophen (TYLENOL) tablet 650 mg  650 mg Oral Q4H PRN Jose P Blood, DO        HYDROcodone-acetaminophen (NORCO) 5-325 MG per tablet 1 tablet  1 tablet Oral Q4H PRN Jose P Blood, DO   1 tablet at 12/21/17 0326    morphine (PF) injection 2 mg  2 mg Intravenous Q2H PRN Jose P Blood, DO        Or    morphine injection 4 mg  4 mg Intravenous Q2H PRN Jose P Blood, DO        magnesium hydroxide (MILK OF MAGNESIA) 400 MG/5ML suspension 30 mL  30 mL Oral Daily PRN Jose P Blood, DO        bisacodyl (DULCOLAX) suppository 10 mg  10 mg Rectal Daily PRN Jose P Blood, DO        ondansetron (ZOFRAN) injection 4 mg  4 mg Intravenous Q6H PRN Jose P Blood, DO        nicotine (NICODERM CQ) 21 MG/24HR 1 patch  1 patch Transdermal Daily PRN Jose P Blood, DO        insulin lispro (HUMALOG) injection vial 0-6 Units  0-6 Units Subcutaneous TID WC Jose P Blood, DO   2 Units at 12/21/17 0901    insulin lispro (HUMALOG) injection vial 0-3 Units  0-3 Units Subcutaneous Nightly Jose P Blood, DO   2 Units at 12/20/17 2152    glucose (GLUTOSE) 40 % oral gel 15 g  15 g Oral PRN Jose P Blood, DO        dextrose 50 % solution 12.5 g  12.5 g Intravenous PRN Jose P Blood, DO        glucagon (rDNA) injection 1 mg  1 mg Intramuscular PRN Jose P Blood, DO        dextrose 5 % solution  100 mL/hr Intravenous PRN Jose P Blood, DO        rOPINIRole (REQUIP) tablet 3 mg  3 mg Oral QAM Jose P Blood, DO   3 mg at 12/21/17 0901    carbidopa-levodopa (SINEMET CR)  MG per extended release tablet 1 tablet  1 tablet Oral 4x Daily Jose P Blood, DO   1 tablet at 12/21/17 0904    tiotropium (SPIRIVA) inhalation capsule 18 mcg  18 mcg Inhalation Daily Jose P Blood, DO   18 mcg at 12/21/17 0748    calcium carbonate (TUMS) chewable tablet 500 mg  500 mg Oral TID Gabo Charles P Blood, DO   500 mg at 12/21/17 0900    0.9 % sodium chloride bolus  250 mL Intravenous Once Adell Beverage Blood, DO        vitamin D (CHOLECALCIFEROL) capsule 5,000 Units  5,000 Units Oral Daily Jose P Blood, DO   5,000 Units at 12/21/17 0900    magnesium oxide (MAG-OX) tablet 400 mg  400 mg Oral Daily Jose P Blood, DO   400 mg at 12/21/17 0900     Allergies   Allergen Reactions    Dye [Barium-Containing Compounds] Other (See Comments)     Cause Afib per     Pcn [Penicillins] Itching and Swelling    Red Dye Itching and Rash     This allergy is likely incorrect:  Per patient's  she has no issue with medications that have red dye in them or red food. He thinks this reaction was added to chart when the pt had a colonoscopy (possibly barium or iodine dye instead)     Principal Problem:    KRISTIN (acute kidney injury) ([facility]  Active Problems:    Controlled type 2 diabetes mellitus with stage 3 chronic kidney disease, without long-term current use of insulin (HCC)    Essential hypertension    Gastroesophageal reflux disease without esophagitis    ECTOR on CPAP    Parkinson disease (HCC)    Iron deficiency anemia due to chronic blood loss    Panlobular emphysema ([facility])    Stage 3 chronic kidney disease    Acute on chronic respiratory failure with hypoxia and hypercapnia ([facility])    Blood pressure (!) 137/56, pulse 78, temperature 97.9 °F (36.6 °C), temperature source Oral, resp. rate 20, height 5' 1\" (1.549 m), weight 180 lb 11.2 oz (82 kg), SpO2 98 %, not currently breastfeeding. Subjective:  Symptoms:  Improved. Objective:  General Appearance:  Comfortable. Vital signs: (most recent): Blood pressure (!) 137/56, pulse 78, temperature 97.9 °F (36.6 °C), temperature source Oral, resp.  rate 20, height 5' 1\" (1.549 m), weight 180

## 2017-12-21 NOTE — DISCHARGE SUMMARY
[facility]    Discharge Summary     Patient ID: Janet Brush  :  1945   MRN: [id number]     ACCOUNT:  [de-identified]   Patient's PCP: Zenon Bowman DO  Admit Date: 2017   Discharge Date: 2017     Length of Stay: 5  Code Status:  Full Code  Admitting Physician: German Perez DO  Discharge Physician: German Perez DO     Active Discharge Diagnoses:     Primary Problem  KRISTIN (acute kidney injury) [facility])      [place] Problems    Diagnosis Date Noted    Iron deficiency anemia due to chronic blood loss [D50.0] 2017    Acute on chronic respiratory failure with hypoxia and hypercapnia (HCC) [J96.21, J96.22] 2017    KRISTIN (acute kidney injury) ([facility] [N17.9] 2017    Stage 3 chronic kidney disease [N18.3] 2017    Panlobular emphysema ([facility] [J43.1] 2017    Parkinson disease ([facility] Genevive Ramp 2014    Gastroesophageal reflux disease without esophagitis [K21.9]     ECTOR on CPAP [G47.33, Z99.89]     Essential hypertension [I10] 2012    Controlled type 2 diabetes mellitus with stage 3 chronic kidney disease, without long-term current use of insulin ([facility] [E11.22, N18.3]        Admission Condition:  poor     Discharged Condition: fair    Hospital Stay:     Hospital Course: Janet Brush is a 81 y.o. female who was admitted for the management of   KRISTIN (acute kidney injury) ([facility] , presented to ER with Loss of Consciousness    Admitted with kristin on ckd, with a fall at home and likely syncope-unwitnessed. Worked up for possible seizure by neuro, but felt to likely be syncope only. Refused mri brain, repeat ct head did show very small icb-will be kept off eliquis. Also developed acute resp failure requiring bipap and o2 sat would drop into 60s-70s without it.   Continued need for bipap, therefore will be sent to [facility]    Treated with steroids and aerosols, along with today. FINDINGS: Chest: Mediastinum: The heart is normal in size. There is no pericardial effusion. Thoracic aorta and pulmonary arteries are normal in caliber. There is extensive coronary artery calcification. No mediastinal, bulky hilar, or axillary lymphadenopathy. Lungs/pleura: There is mild linear bibasilar atelectasis and/or scarring. No focal airspace consolidation, pneumothorax, or pleural effusion. Soft Tissues/Bones: There is no acute or suspicious osseous abnormality. There is mild focal haziness of the subcutaneous fat in the lower posterior right chest, likely related to contusion. No evidence of a rib fracture. Abdomen/Pelvis: Organs: The liver demonstrates an unremarkable unenhanced appearance. Gallbladder is distended. No visible cholelithiasis or significant pericholecystic inflammatory change. There is some minimal layering density in the gallbladder, suggesting sludge. Limited unenhanced assessment of the spleen, pancreas, and adrenal glands is unremarkable. A few small calculi are noted in each of the kidneys. There is no hydronephrosis or perinephric fluid collection. There is an exophytic 3.2 x 3.2 cm mass at the posterior inferior left kidney, which is increased in size from the previous study and not compatible with a simple cyst. GI/Bowel: The colon is redundant with scattered colonic diverticula noted. No evidence of acute diverticulitis. There is no abnormal bowel distention or pericolonic inflammation. No free intraperitoneal air or fluid. Appendix is normal. Pelvis: Urinary bladder is decompressed secondary to Jacinto catheterization. Several calcifications are noted in the uterus, likely fibroids. No dominant adnexal mass. No pelvic lymphadenopathy. Peritoneum/Retroperitoneum: The abdominal aorta is normal in caliber. There is no retroperitoneal or mesenteric lymphadenopathy. Bones/Soft Tissues: There is no acute or suspicious osseous abnormality.  Visualized superficial No acute fracture or dislocation. There is mild widening of the lateral tibiotalar ankle mortise. Prominent bony spurring is noted at the medial malleolus and distal fibula. There is moderate plantar calcaneal spurring. Scattered vascular calcifications are noted. 1.  No acute fracture or dislocation seen at the right ankle. 2.  Mild asymmetric widening at the lateral aspect of the tibiotalar ankle mortise, which could be related to ligamentous injury. Ct Head Wo Contrast    Result Date: 12/18/2017  EXAMINATION: CT OF THE HEAD WITHOUT CONTRAST  12/18/2017 5:06 pm TECHNIQUE: CT of the head was performed without the administration of intravenous contrast. Dose modulation, iterative reconstruction, and/or weight based adjustment of the mA/kV was utilized to reduce the radiation dose to as low as reasonably achievable. COMPARISON: 16 December 2017 HISTORY: ORDERING SYSTEM PROVIDED HISTORY: delayed effects of cerebral ischemia or mass effects. TECHNOLOGIST PROVIDED HISTORY: Has a \"code stroke\" or \"stroke alert\" been called? ->No FINDINGS: BRAIN/VENTRICLES: Multiple areas of calcification in the basal ganglia and cerebellum are again identified. There is subtly increased attenuation in the bifrontal subdural space suggesting a small amount of bifrontal subdural hemorrhage. Punctate areas of calcification in the corona radiata are noted, unchanged. Gray-white interdigitations are preserved without evidence of acute major ischemia. However, there is patchy hypodensity in the white matter likely related to chronic microvascular change. No hydrocephalus or midline shift is noted. No intra-axial mass is seen. ORBITS: The visualized portion of the orbits demonstrate no acute abnormality. SINUSES: Polyp or retention cyst is noted in the right maxillary sinus. Remainder of the paranasal sinuses are fairly well aerated. Mastoid air cells are somewhat underpneumatized likely related to chronic inflammation.  SOFT TISSUES/SKULL:  No acute abnormality in the skull or soft tissues. There is redemonstration of a right frontal scalp hematoma of 2.1 cm. Estimated biologic radiation dose for this procedure:  910.65 mGy/cm2. Subtly increased attenuation is noted in the subarachnoid area in the bifrontal region which may represent interval development of a small amount of subarachnoid blood. This lies the immediately deep to the scalp hematoma. Critical results were called by Dr. Lizzette Campbell MD to DR. BLOOD on 12/18/2017 at 17:16. Ct Head Wo Contrast    Result Date: 12/16/2017  EXAMINATION: CT OF THE HEAD WITHOUT CONTRAST  12/16/2017 8:45 am TECHNIQUE: CT of the head was performed without the administration of intravenous contrast. Dose modulation, iterative reconstruction, and/or weight based adjustment of the mA/kV was utilized to reduce the radiation dose to as low as reasonably achievable. COMPARISON: December 27, 2016 HISTORY: ORDERING SYSTEM PROVIDED HISTORY: PAIN, MAX-FACIAL Acute / initial encounter FINDINGS: BRAIN/VENTRICLES: No acute intracranial hemorrhage. No mass effect. No midline shift. Multiple areas of calcification are again seen with dense calcification of the basal ganglia. Mild prominence of the ventricles and sulci is consistent with atrophy. Moderate periventricular and subcortical white matter hypodensities are nonspecific but likely represent microvascular disease. ORBITS: The visualized portion of the orbits demonstrate no acute abnormality. SINUSES: Mastoid air cells are clear. Polyp or mucous retention cyst is seen within the right maxillary sinus. SOFT TISSUES/SKULL:  No acute skull fracture. Soft tissue lesion is seen along the forehead just right of midline. This appears encapsulated and could represent a hematoma versus a sebaceous cyst or other lesion. No acute intracranial abnormality.  Soft tissue lesion along the forehead right of midline may be related to trauma or could abnormal bowel distention or pericolonic inflammation. No free intraperitoneal air or fluid. Appendix is normal. Pelvis: Urinary bladder is decompressed secondary to Jacinto catheterization. Several calcifications are noted in the uterus, likely fibroids. No dominant adnexal mass. No pelvic lymphadenopathy. Peritoneum/Retroperitoneum: The abdominal aorta is normal in caliber. There is no retroperitoneal or mesenteric lymphadenopathy. Bones/Soft Tissues: There is no acute or suspicious osseous abnormality. Visualized superficial soft tissues are within normal limits. 1.  Suspected soft tissue contusion at the posterior lower right chest wall. No evidence of rib fracture. 2.  No findings to indicate acute traumatic injury in the chest, abdomen, or pelvis. 3.  Distended gallbladder with suspected sludge. No visible cholelithiasis or pericholecystic inflammatory change. If desired, this could be further evaluated with ultrasound. 4.  Enlarging indeterminate lesion at the posterior inferior left kidney, measuring 3.2 cm. This is not compatible with a simple cyst based on CT attenuation characteristics. However, this was previously evaluated with ultrasound on 11/11/2015 and reportedly corresponded to a cyst at that time, suggesting a hemorrhagic cyst. 5.  Additional incidental findings described above. Ct Cervical Spine Wo Contrast    Result Date: 12/16/2017  EXAMINATION: CT OF THE CERVICAL SPINE WITHOUT CONTRAST 12/16/2017 8:45 am TECHNIQUE: CT of the cervical spine was performed without the administration of intravenous contrast. Multiplanar reformatted images are provided for review. Dose modulation, iterative reconstruction, and/or weight based adjustment of the mA/kV was utilized to reduce the radiation dose to as low as reasonably achievable. COMPARISON: December 27, 2016 HISTORY: ORDERING SYSTEM PROVIDED HISTORY: NECK PAIN, CERVICAL SPINE Acute/initial encounter.  FINDINGS: BONES/ALIGNMENT: Prior carbidopa-levodopa  MG per extended release tablet  Commonly known as:  SINEMET CR  What changed:  Another medication with the same name was removed. Continue taking this medication, and follow the directions you see here. miconazole 2 % powder  Commonly known as:  MICOTIN  Apply topically 2 times daily. What changed:  additional instructions     mometasone-formoterol 200-5 MCG/ACT inhaler  Commonly known as:  DULERA  Inhale 2 puffs into the lungs 2 times daily  What changed:  · when to take this  · reasons to take this        CONTINUE taking these medications    amiodarone 200 MG tablet  Commonly known as:  CORDARONE  Take 1 tablet by mouth daily     atorvastatin 20 MG tablet  Commonly known as:  LIPITOR  TAKE 1 TABLET DAILY     calcitRIOL 0.25 MCG capsule  Commonly known as:  ROCALTROL  TAKE 1 CAPSULE THREE TIMES A DAY     calcium elemental 500 MG Tabs tablet  Commonly known as:  OSCAL     cyanocobalamin 1000 MCG tablet  Take 1 tablet by mouth daily     furosemide 40 MG tablet  Commonly known as:  LASIX     HYDROcodone-acetaminophen 5-325 MG per tablet  Commonly known as:  NORCO  Take 1 tablet by mouth 2 times daily as needed for Pain .     magnesium oxide 400 MG tablet  Commonly known as:  MAG-OX     metoprolol tartrate 25 MG tablet  Commonly known as:  LOPRESSOR  Take 1 tablet by mouth 2 times daily     Oxygen Concentrator  Dx: Pneumonia, use as directed.      pantoprazole 40 MG tablet  Commonly known as:  PROTONIX  Take 1 tablet by mouth 2 times daily (before meals)     * PROAIR  (90 Base) MCG/ACT inhaler  Generic drug:  albuterol sulfate HFA     * albuterol (2.5 MG/3ML) 0.083% nebulizer solution  Commonly known as:  PROVENTIL  Take 3 mLs by nebulization every 6 hours as needed for Wheezing     rasagiline mesylate 1 MG Tabs     * rOPINIRole 2 MG tablet  Commonly known as:  REQUIP     * REQUIP 3 MG tablet  Generic drug:  rOPINIRole     SPIRIVA RESPIMAT 2.5 MCG/ACT Aers inhaler  Generic drug:

## 2017-12-21 NOTE — DISCHARGE SUMMARY
Discharge Note:      All discharge instructions given at this time as well as all patient belongings returned to patient. Pt denies any further questions regarding discharge at this time. Pt denies any further issues at this time. Patient transferred into the care of transport team from [facility] [address] on stretcher at this time.

## 2017-12-22 ENCOUNTER — HOSPITAL ENCOUNTER (OUTPATIENT)
Age: 72
Setting detail: SPECIMEN
Discharge: HOME | End: 2017-12-22
Payer: MEDICARE

## 2017-12-22 LAB
BNP INTERPRETATION: ABNORMAL
ESTIMATED AVERAGE GLUCOSE: 111 MG/DL
HBA1C MFR BLD: 5.5 % (ref 4–6)
PRO-BNP: 814 PG/ML
TSH SERPL DL<=0.05 MIU/L-ACNC: 1.09 MIU/L (ref 0.3–5)

## 2017-12-22 PROCEDURE — 83036 HEMOGLOBIN GLYCOSYLATED A1C: CPT

## 2017-12-22 PROCEDURE — 83880 ASSAY OF NATRIURETIC PEPTIDE: CPT

## 2017-12-22 PROCEDURE — 84443 ASSAY THYROID STIM HORMONE: CPT

## 2017-12-23 ENCOUNTER — HOSPITAL ENCOUNTER (OUTPATIENT)
Age: 72
Setting detail: SPECIMEN
Discharge: HOME | End: 2017-12-23
Payer: MEDICARE

## 2017-12-23 LAB
FERRITIN: 73 UG/L (ref 13–150)
FOLATE: >20 NG/ML
VITAMIN B-12: >2000 PG/ML (ref 232–1245)

## 2017-12-23 PROCEDURE — 82607 VITAMIN B-12: CPT

## 2017-12-23 PROCEDURE — 82728 ASSAY OF FERRITIN: CPT

## 2017-12-23 PROCEDURE — 82746 ASSAY OF FOLIC ACID SERUM: CPT

## 2017-12-24 ENCOUNTER — HOSPITAL ENCOUNTER (OUTPATIENT)
Age: 72
Setting detail: SPECIMEN
Discharge: HOME | End: 2017-12-24
Payer: MEDICARE

## 2017-12-24 LAB
-: NORMAL
ABSOLUTE RETIC #: 0.09 M/UL (ref 0.02–0.1)
AMORPHOUS: NORMAL
BACTERIA: NORMAL
BILIRUBIN URINE: NEGATIVE
CASTS UA: NORMAL /LPF (ref 0–8)
COLOR: YELLOW
COMMENT UA: ABNORMAL
CRYSTALS, UA: NORMAL /HPF
EPITHELIAL CELLS UA: NORMAL /HPF (ref 0–5)
FERRITIN: 62 UG/L (ref 13–150)
FOLATE: >20 NG/ML
GLUCOSE URINE: NEGATIVE
HCT VFR BLD CALC: 25.1 % (ref 36–46)
HEMOGLOBIN: 8 G/DL (ref 12–16)
IMMATURE RETIC FRACT: ABNORMAL %
IRON SATURATION: 19 % (ref 20–55)
IRON: 58 UG/DL (ref 37–145)
KETONES, URINE: NEGATIVE
LEUKOCYTE ESTERASE, URINE: ABNORMAL
MCH RBC QN AUTO: 29.9 PG (ref 26–34)
MCHC RBC AUTO-ENTMCNC: 31.8 G/DL (ref 31–37)
MCV RBC AUTO: 94.1 FL (ref 80–100)
MUCUS: NORMAL
NITRITE, URINE: NEGATIVE
OTHER OBSERVATIONS UA: NORMAL
PDW BLD-RTO: 18.5 % (ref 11.5–14.5)
PH UA: 5 (ref 5–8)
PLATELET # BLD: 244 K/UL (ref 130–400)
PMV BLD AUTO: 8.4 FL (ref 6–12)
PROTEIN UA: NEGATIVE
RBC # BLD: 2.66 M/UL (ref 4–5.2)
RBC UA: NORMAL /HPF (ref 0–4)
RENAL EPITHELIAL, UA: NORMAL /HPF
RETIC %: 3.4 % (ref 0.5–2)
RETIC HEMOGLOBIN: ABNORMAL PG (ref 28.2–35.7)
SPECIFIC GRAVITY UA: 1.01 (ref 1–1.03)
TOTAL IRON BINDING CAPACITY: 298 UG/DL (ref 250–450)
TRICHOMONAS: NORMAL
TURBIDITY: ABNORMAL
UNSATURATED IRON BINDING CAPACITY: 240 UG/DL (ref 112–347)
URINE HGB: ABNORMAL
UROBILINOGEN, URINE: NORMAL
VITAMIN B-12: 1980 PG/ML (ref 232–1245)
WBC # BLD: 11.7 K/UL (ref 3.5–11)
WBC UA: NORMAL /HPF (ref 0–5)
YEAST: NORMAL

## 2017-12-24 PROCEDURE — 82607 VITAMIN B-12: CPT

## 2017-12-24 PROCEDURE — 83540 ASSAY OF IRON: CPT

## 2017-12-24 PROCEDURE — 83550 IRON BINDING TEST: CPT

## 2017-12-24 PROCEDURE — 81001 URINALYSIS AUTO W/SCOPE: CPT

## 2017-12-24 PROCEDURE — 87086 URINE CULTURE/COLONY COUNT: CPT

## 2017-12-24 PROCEDURE — 82746 ASSAY OF FOLIC ACID SERUM: CPT

## 2017-12-24 PROCEDURE — 82728 ASSAY OF FERRITIN: CPT

## 2017-12-24 PROCEDURE — 85027 COMPLETE CBC AUTOMATED: CPT

## 2017-12-24 PROCEDURE — 85045 AUTOMATED RETICULOCYTE COUNT: CPT

## 2017-12-25 LAB
CULTURE: NO GROWTH
CULTURE: NORMAL
Lab: NORMAL
SPECIMEN DESCRIPTION: NORMAL
STATUS: NORMAL

## 2018-01-02 ENCOUNTER — HOSPITAL ENCOUNTER (OUTPATIENT)
Age: 73
Setting detail: SPECIMEN
Discharge: HOME | End: 2018-01-02
Payer: MEDICARE

## 2018-01-02 LAB
ANION GAP SERPL CALCULATED.3IONS-SCNC: 15 MMOL/L (ref 9–17)
BUN BLDV-MCNC: 43 MG/DL (ref 8–23)
BUN/CREAT BLD: ABNORMAL (ref 9–20)
CALCIUM SERPL-MCNC: 7.9 MG/DL (ref 8.6–10.4)
CHLORIDE BLD-SCNC: 97 MMOL/L (ref 98–107)
CO2: 32 MMOL/L (ref 20–31)
CREAT SERPL-MCNC: 1.42 MG/DL (ref 0.5–0.9)
GFR AFRICAN AMERICAN: 44 ML/MIN
GFR NON-AFRICAN AMERICAN: 37 ML/MIN
GFR SERPL CREATININE-BSD FRML MDRD: ABNORMAL ML/MIN/{1.73_M2}
GFR SERPL CREATININE-BSD FRML MDRD: ABNORMAL ML/MIN/{1.73_M2}
GLUCOSE BLD-MCNC: 240 MG/DL (ref 70–99)
HCT VFR BLD CALC: 28.2 % (ref 36.3–47.1)
HEMOGLOBIN: 8.2 G/DL (ref 11.9–15.1)
MCH RBC QN AUTO: 30.6 PG (ref 25.2–33.5)
MCHC RBC AUTO-ENTMCNC: 29.1 G/DL (ref 28.4–34.8)
MCV RBC AUTO: 105.2 FL (ref 82.6–102.9)
PDW BLD-RTO: 18.3 % (ref 11.8–14.4)
PLATELET # BLD: 161 K/UL (ref 138–453)
PMV BLD AUTO: 11.2 FL (ref 8.1–13.5)
POTASSIUM SERPL-SCNC: 4.4 MMOL/L (ref 3.7–5.3)
RBC # BLD: 2.68 M/UL (ref 3.95–5.11)
SODIUM BLD-SCNC: 144 MMOL/L (ref 135–144)
WBC # BLD: 12.2 K/UL (ref 3.5–11.3)

## 2018-01-02 PROCEDURE — P9603 ONE-WAY ALLOW PRORATED MILES: HCPCS

## 2018-01-02 PROCEDURE — 85027 COMPLETE CBC AUTOMATED: CPT

## 2018-01-02 PROCEDURE — 80048 BASIC METABOLIC PNL TOTAL CA: CPT

## 2018-01-02 PROCEDURE — 36415 COLL VENOUS BLD VENIPUNCTURE: CPT

## 2018-01-17 ENCOUNTER — TELEPHONE (OUTPATIENT)
Dept: FAMILY MEDICINE CLINIC | Age: 73
End: 2018-01-17

## 2018-01-17 RX ORDER — GLUCOSAMINE HCL/CHONDROITIN SU 500-400 MG
1 CAPSULE ORAL 3 TIMES DAILY
Qty: 300 STRIP | Refills: 3 | Status: ON HOLD | OUTPATIENT
Start: 2018-01-17 | End: 2018-03-10 | Stop reason: HOSPADM

## 2018-01-17 NOTE — TELEPHONE ENCOUNTER
Heide Vaughn [phone] [address] is still is a major concern. Working on basic balance and safety. 2x per week for 3 weeks. Then 1x per week for one week.

## 2018-01-22 ENCOUNTER — OFFICE VISIT (OUTPATIENT)
Dept: FAMILY MEDICINE CLINIC | Age: 73
End: 2018-01-22
Payer: COMMERCIAL

## 2018-01-22 VITALS
DIASTOLIC BLOOD PRESSURE: 54 MMHG | HEIGHT: 61 IN | OXYGEN SATURATION: 99 % | HEART RATE: 64 BPM | BODY MASS INDEX: 33.99 KG/M2 | WEIGHT: 180 LBS | SYSTOLIC BLOOD PRESSURE: 122 MMHG

## 2018-01-22 DIAGNOSIS — K21.9 GASTROESOPHAGEAL REFLUX DISEASE WITHOUT ESOPHAGITIS: ICD-10-CM

## 2018-01-22 DIAGNOSIS — J44.1 COPD WITH EXACERBATION (HCC): ICD-10-CM

## 2018-01-22 DIAGNOSIS — Z09 HOSPITAL DISCHARGE FOLLOW-UP: Primary | ICD-10-CM

## 2018-01-22 DIAGNOSIS — R60.0 EDEMA OF BOTH LEGS: ICD-10-CM

## 2018-01-22 PROCEDURE — 99215 OFFICE O/P EST HI 40 MIN: CPT | Performed by: FAMILY MEDICINE

## 2018-01-22 RX ORDER — ROPINIROLE 2 MG/1
2 TABLET, FILM COATED ORAL 3 TIMES DAILY
COMMUNITY
End: 2019-04-09 | Stop reason: SDUPTHER

## 2018-01-22 RX ORDER — FUROSEMIDE 20 MG/1
20 TABLET ORAL 2 TIMES DAILY
COMMUNITY
End: 2018-01-22 | Stop reason: SDUPTHER

## 2018-01-22 RX ORDER — FUROSEMIDE 20 MG/1
20 TABLET ORAL 2 TIMES DAILY
Qty: 180 TABLET | Refills: 3 | Status: ON HOLD | OUTPATIENT
Start: 2018-01-22 | End: 2018-05-31 | Stop reason: DRUGHIGH

## 2018-01-22 RX ORDER — ROPINIROLE 3 MG/1
2 TABLET, FILM COATED ORAL 3 TIMES DAILY
COMMUNITY
Start: 2017-11-27 | End: 2018-01-22

## 2018-01-22 RX ORDER — FLUTICASONE FUROATE AND VILANTEROL TRIFENATATE 100; 25 UG/1; UG/1
POWDER RESPIRATORY (INHALATION)
Refills: 0 | Status: ON HOLD | COMMUNITY
Start: 2018-01-12 | End: 2018-03-08 | Stop reason: SDUPTHER

## 2018-01-22 RX ORDER — PANTOPRAZOLE SODIUM 40 MG/1
40 TABLET, DELAYED RELEASE ORAL
Qty: 180 TABLET | Refills: 3 | Status: SHIPPED | OUTPATIENT
Start: 2018-01-22 | End: 2019-02-05 | Stop reason: SDUPTHER

## 2018-01-22 RX ORDER — FLUTICASONE FUROATE AND VILANTEROL TRIFENATATE 100; 25 UG/1; UG/1
1 POWDER RESPIRATORY (INHALATION) DAILY
Qty: 3 EACH | Refills: 3 | Status: SHIPPED | OUTPATIENT
Start: 2018-01-22 | End: 2019-01-15 | Stop reason: SDUPTHER

## 2018-01-26 DIAGNOSIS — G89.4 CHRONIC PAIN SYNDROME: ICD-10-CM

## 2018-01-26 RX ORDER — HYDROCODONE BITARTRATE AND ACETAMINOPHEN 5; 325 MG/1; MG/1
1 TABLET ORAL 2 TIMES DAILY PRN
Qty: 60 TABLET | Refills: 0 | Status: SHIPPED | OUTPATIENT
Start: 2018-01-26 | End: 2018-02-26 | Stop reason: SDUPTHER

## 2018-02-16 DIAGNOSIS — G89.4 CHRONIC PAIN SYNDROME: ICD-10-CM

## 2018-02-16 RX ORDER — HYDROCODONE BITARTRATE AND ACETAMINOPHEN 5; 325 MG/1; MG/1
1 TABLET ORAL 2 TIMES DAILY PRN
Qty: 60 TABLET | Refills: 0 | OUTPATIENT
Start: 2018-02-16 | End: 2018-03-18

## 2018-02-20 ENCOUNTER — OFFICE VISIT (OUTPATIENT)
Dept: FAMILY MEDICINE CLINIC | Age: 73
End: 2018-02-20
Payer: COMMERCIAL

## 2018-02-20 VITALS
WEIGHT: 178 LBS | HEIGHT: 61 IN | DIASTOLIC BLOOD PRESSURE: 60 MMHG | SYSTOLIC BLOOD PRESSURE: 135 MMHG | HEART RATE: 75 BPM | BODY MASS INDEX: 33.61 KG/M2

## 2018-02-20 DIAGNOSIS — D50.0 IRON DEFICIENCY ANEMIA DUE TO CHRONIC BLOOD LOSS: ICD-10-CM

## 2018-02-20 DIAGNOSIS — R53.82 CHRONIC FATIGUE: Primary | ICD-10-CM

## 2018-02-20 DIAGNOSIS — L03.115 CELLULITIS OF RIGHT LEG: ICD-10-CM

## 2018-02-20 PROCEDURE — 99214 OFFICE O/P EST MOD 30 MIN: CPT | Performed by: FAMILY MEDICINE

## 2018-02-20 PROCEDURE — 4040F PNEUMOC VAC/ADMIN/RCVD: CPT | Performed by: FAMILY MEDICINE

## 2018-02-20 PROCEDURE — 1123F ACP DISCUSS/DSCN MKR DOCD: CPT | Performed by: FAMILY MEDICINE

## 2018-02-20 PROCEDURE — 3014F SCREEN MAMMO DOC REV: CPT | Performed by: FAMILY MEDICINE

## 2018-02-20 PROCEDURE — G8417 CALC BMI ABV UP PARAM F/U: HCPCS | Performed by: FAMILY MEDICINE

## 2018-02-20 PROCEDURE — G8400 PT W/DXA NO RESULTS DOC: HCPCS | Performed by: FAMILY MEDICINE

## 2018-02-20 PROCEDURE — 1090F PRES/ABSN URINE INCON ASSESS: CPT | Performed by: FAMILY MEDICINE

## 2018-02-20 PROCEDURE — 1036F TOBACCO NON-USER: CPT | Performed by: FAMILY MEDICINE

## 2018-02-20 PROCEDURE — G8484 FLU IMMUNIZE NO ADMIN: HCPCS | Performed by: FAMILY MEDICINE

## 2018-02-20 PROCEDURE — 3017F COLORECTAL CA SCREEN DOC REV: CPT | Performed by: FAMILY MEDICINE

## 2018-02-20 PROCEDURE — G8427 DOCREV CUR MEDS BY ELIG CLIN: HCPCS | Performed by: FAMILY MEDICINE

## 2018-02-20 RX ORDER — DOXYCYCLINE HYCLATE 100 MG
100 TABLET ORAL 2 TIMES DAILY
Qty: 20 TABLET | Refills: 0 | Status: SHIPPED | OUTPATIENT
Start: 2018-02-20 | End: 2018-03-02

## 2018-02-20 ASSESSMENT — PATIENT HEALTH QUESTIONNAIRE - PHQ9
SUM OF ALL RESPONSES TO PHQ QUESTIONS 1-9: 0
1. LITTLE INTEREST OR PLEASURE IN DOING THINGS: 0
SUM OF ALL RESPONSES TO PHQ9 QUESTIONS 1 & 2: 0
2. FEELING DOWN, DEPRESSED OR HOPELESS: 0

## 2018-02-20 NOTE — PROGRESS NOTES
01/02/2018 43 (H)     CREATININE (mg/dL)   Date Value   01/02/2018 1.42 (H)     Glucose (mg/dL)   Date Value   01/02/2018 240 (H)   03/07/2012 132 (H)     Hemoglobin A1C (%)   Date Value   12/22/2017 5.5     Microalb/Crt. Ratio (mcg/mg creat)   Date Value   11/09/2017 174 (H)              Subjective:      HPI  Is here today complaining of increasing fatigue chills napping throughout the day going on for the last several days which is worse than she had been feeling. She also states that her right leg is red and swollen and tender    Review of Systems:     Constitutional: Negative for fever, appetite change and fatigue. Family social and medical history reviewed and unchanged     HENT: Negative. Negative for nosebleeds, trouble swallowing and neck pain. Eyes: Negative for photophobia and visual disturbance. Respiratory: Negative. Negative for chest tightness and shortness of breath. Cardiovascular: Negative. Negative for chest pain and leg swelling. Gastrointestinal: Negative. Negative for abdominal pain and blood in stool. Endocrine: Negative for cold intolerance and polyuria. Genitourinary: Negative for dysuria and hematuria. Musculoskeletal: Negative. Skin: Negative for rash. Allergic/Immunologic: Negative. Neurological: Negative. Negative for dizziness, weakness and numbness. Hematological: Negative. Negative for adenopathy. Does not bruise/bleed easily. Psychiatric/Behavioral: Negative for sleep disturbance, dysphoric mood and  decreased concentration. The patient is not nervous/anxious. Objective:     Physical Exam:     Nursing note and vitals reviewed. /60   Pulse 75   Ht 5' 1\" (1.549 m)   Wt 178 lb (80.7 kg)   BMI 33.63 kg/m²   Constitutional: She is oriented to person, place, and time. She   appears well-developed and well-nourished. HENT:   Head: Normocephalic and atraumatic.     Right Ear: External ear normal. Tympanic membrane is not erythematous. No middle ear effusion. Left Ear: External ear normal. Tympanic membrane is not erythematous. No middle ear effusion. Nose: No mucosal edema. Mouth/Throat: Oropharynx is clear and moist. No posterior oropharyngeal erythema. Eyes: Conjunctivae and EOM are normal. Pupils are equal, round, and reactive to light. Neck: Normal range of motion. Neck supple. No thyromegaly present. Cardiovascular: Normal rate, regular rhythm and normal heart sounds. No murmur heard. Pulmonary/Chest: Effort normal and breath sounds normal. She has no wheezes. Shehas no rales. Abdominal: Soft. Bowel sounds are normal. She exhibits no distension and no mass. There is no tenderness. There is no rebound and no guarding. Genitourinary/Anorectal:deferred  Musculoskeletal: Normal range of motion. She exhibits 2+ edema or tenderness. and reddness r lower ext   Lymphadenopathy: She has no cervical adenopathy. Neurological: She is alert and oriented to person, place, and time. She has normal reflexes. Skin: Skin is warm and dry. No rash noted. Psychiatric: She has a normal mood and affect. Her   behavior is normal.       Assessment:      1. Chronic fatigue    2. Iron deficiency anemia due to chronic blood loss    3. Cellulitis of right leg          Plan:      Call or return to clinic prn if these symptoms worsen or fail to improve as anticipated. I have reviewed the instructions with the patient, answering all questions to her satisfaction. No Follow-up on file. Orders Placed This Encounter   Procedures    CBC Auto Differential     Standing Status:   Future     Standing Expiration Date:   2/20/2019    Iron and TIBC     Standing Status:   Future     Standing Expiration Date:   2/20/2019     Order Specific Question:   Is Patient Fasting? Answer:   yes     Order Specific Question:   No of Hours?      Answer:   15    Vitamin B12 & Folate     Standing Status:   Future     Standing Expiration Date:

## 2018-02-22 ENCOUNTER — PATIENT MESSAGE (OUTPATIENT)
Dept: FAMILY MEDICINE CLINIC | Age: 73
End: 2018-02-22

## 2018-02-26 ENCOUNTER — PATIENT MESSAGE (OUTPATIENT)
Dept: FAMILY MEDICINE CLINIC | Age: 73
End: 2018-02-26

## 2018-02-26 DIAGNOSIS — L03.115 CELLULITIS OF RIGHT LEG: ICD-10-CM

## 2018-02-26 DIAGNOSIS — G89.4 CHRONIC PAIN SYNDROME: ICD-10-CM

## 2018-02-26 RX ORDER — HYDROCODONE BITARTRATE AND ACETAMINOPHEN 5; 325 MG/1; MG/1
1 TABLET ORAL 2 TIMES DAILY PRN
Qty: 60 TABLET | Refills: 0 | Status: SHIPPED | OUTPATIENT
Start: 2018-02-26 | End: 2018-04-03 | Stop reason: SDUPTHER

## 2018-02-26 RX ORDER — CIPROFLOXACIN 500 MG/1
500 TABLET, FILM COATED ORAL 2 TIMES DAILY
Qty: 20 TABLET | Refills: 0 | Status: SHIPPED | OUTPATIENT
Start: 2018-02-26 | End: 2018-03-08

## 2018-02-26 RX ORDER — DOXYCYCLINE HYCLATE 100 MG
100 TABLET ORAL 2 TIMES DAILY
Qty: 20 TABLET | Refills: 0 | OUTPATIENT
Start: 2018-02-26 | End: 2018-03-08

## 2018-02-26 NOTE — TELEPHONE ENCOUNTER
From: Hema Olivarez  To: Chantal Charles DO  Sent: 2/26/2018 7:16 AM EST  Subject: Non-Urgent Medical Question    I sent this yesterday on Sunday. This is Barbs  Alyssa Robles. She is having an allergic reaction to this medication doxycycline so  I have stopped giving it to her. Her face & eyes got slightly swelled up Saturday. She is also having chills, her temperature elevated to 98.5 (Sunday morning). Can Doctor Sergio prescribe a different antibiotic for her? Please call me at my cell phone [phone]. Thank you. Update: Monday AM  Her temp this morning is better at 97.7, I've given her aspirin. Please have Josafat nurse give me call today at [phone], thank you.

## 2018-02-28 RX ORDER — SITAGLIPTIN AND METFORMIN HYDROCHLORIDE 1000; 50 MG/1; MG/1
TABLET, FILM COATED ORAL
Qty: 180 TABLET | Refills: 1 | Status: ON HOLD | OUTPATIENT
Start: 2018-02-28 | End: 2018-06-07 | Stop reason: HOSPADM

## 2018-03-07 ENCOUNTER — PATIENT MESSAGE (OUTPATIENT)
Dept: FAMILY MEDICINE CLINIC | Age: 73
End: 2018-03-07

## 2018-03-07 ENCOUNTER — HOSPITAL ENCOUNTER (INPATIENT)
Age: 73
LOS: 2 days | Discharge: HOME | DRG: 683 | End: 2018-03-10
Attending: EMERGENCY MEDICINE | Admitting: FAMILY MEDICINE
Payer: COMMERCIAL

## 2018-03-07 DIAGNOSIS — N93.9 VAGINAL BLEEDING: Primary | ICD-10-CM

## 2018-03-07 DIAGNOSIS — N39.0 URINARY TRACT INFECTION WITH HEMATURIA, SITE UNSPECIFIED: ICD-10-CM

## 2018-03-07 DIAGNOSIS — R31.9 URINARY TRACT INFECTION WITH HEMATURIA, SITE UNSPECIFIED: ICD-10-CM

## 2018-03-07 DIAGNOSIS — N17.9 ACUTE KIDNEY INJURY SUPERIMPOSED ON CHRONIC KIDNEY DISEASE (HCC): ICD-10-CM

## 2018-03-07 DIAGNOSIS — N18.9 ACUTE KIDNEY INJURY SUPERIMPOSED ON CHRONIC KIDNEY DISEASE (HCC): ICD-10-CM

## 2018-03-07 PROCEDURE — 51702 INSERT TEMP BLADDER CATH: CPT

## 2018-03-07 PROCEDURE — 99284 EMERGENCY DEPT VISIT MOD MDM: CPT

## 2018-03-07 RX ORDER — SULFAMETHOXAZOLE AND TRIMETHOPRIM 800; 160 MG/1; MG/1
1 TABLET ORAL 2 TIMES DAILY
Qty: 20 TABLET | Refills: 0 | Status: SHIPPED | OUTPATIENT
Start: 2018-03-07 | End: 2018-03-08

## 2018-03-07 NOTE — TELEPHONE ENCOUNTER
From: Stanley Galvez  To: Rodrigo Fry DO  Sent: 3/7/2018 7:14 AM EST  Subject: Visit Follow-Up Question    This is Barbs  Nicholas Marie. Abigail's leg is still red, today is her last day of Cipro dosage. She is also nauseated a lot, and having trouble going in the bathroom. She feels burning when she urinates, and has a hard time with BM's (UTI?). Can doctor Hill please follow up with me at [phone]. Thank you.

## 2018-03-08 ENCOUNTER — APPOINTMENT (OUTPATIENT)
Dept: CT IMAGING | Age: 73
DRG: 683 | End: 2018-03-08
Payer: COMMERCIAL

## 2018-03-08 PROBLEM — N39.0 URINARY TRACT INFECTION WITH HEMATURIA: Status: ACTIVE | Noted: 2018-03-08

## 2018-03-08 PROBLEM — N18.9 ACUTE KIDNEY INJURY SUPERIMPOSED ON CHRONIC KIDNEY DISEASE (HCC): Status: ACTIVE | Noted: 2018-03-08

## 2018-03-08 PROBLEM — B37.2 CANDIDAL INTERTRIGO: Status: ACTIVE | Noted: 2018-03-08

## 2018-03-08 PROBLEM — N36.2 URETHRAL CARUNCLE: Status: ACTIVE | Noted: 2018-03-08

## 2018-03-08 PROBLEM — R31.9 URINARY TRACT INFECTION WITH HEMATURIA: Status: ACTIVE | Noted: 2018-03-08

## 2018-03-08 PROBLEM — N18.4 CKD STAGE 4 DUE TO TYPE 2 DIABETES MELLITUS (HCC): Chronic | Status: ACTIVE | Noted: 2018-03-08

## 2018-03-08 PROBLEM — N17.9 ACUTE KIDNEY INJURY SUPERIMPOSED ON CHRONIC KIDNEY DISEASE (HCC): Status: ACTIVE | Noted: 2018-03-08

## 2018-03-08 PROBLEM — E11.22 CKD STAGE 4 DUE TO TYPE 2 DIABETES MELLITUS (HCC): Chronic | Status: ACTIVE | Noted: 2018-03-08

## 2018-03-08 PROBLEM — N20.0 NEPHROLITHIASIS: Chronic | Status: ACTIVE | Noted: 2018-03-08

## 2018-03-08 LAB
-: ABNORMAL
-: ABNORMAL
ABO/RH: NORMAL
ABSOLUTE EOS #: 0.2 K/UL (ref 0–0.4)
ABSOLUTE IMMATURE GRANULOCYTE: ABNORMAL K/UL (ref 0–0.3)
ABSOLUTE LYMPH #: 1.2 K/UL (ref 1–4.8)
ABSOLUTE MONO #: 0.8 K/UL (ref 0.2–0.8)
ALBUMIN SERPL-MCNC: 3.8 G/DL (ref 3.5–5.2)
ALBUMIN/GLOBULIN RATIO: ABNORMAL (ref 1–2.5)
ALP BLD-CCNC: 35 U/L (ref 35–104)
ALT SERPL-CCNC: 6 U/L (ref 5–33)
AMORPHOUS: ABNORMAL
AMORPHOUS: ABNORMAL
ANION GAP SERPL CALCULATED.3IONS-SCNC: 12 MMOL/L (ref 9–17)
ANION GAP SERPL CALCULATED.3IONS-SCNC: 16 MMOL/L (ref 9–17)
ANTIBODY SCREEN: NEGATIVE
ARM BAND NUMBER: NORMAL
AST SERPL-CCNC: 22 U/L
BACTERIA: ABNORMAL
BACTERIA: ABNORMAL
BASOPHILS # BLD: 0 % (ref 0–2)
BASOPHILS ABSOLUTE: 0 K/UL (ref 0–0.2)
BILIRUB SERPL-MCNC: 0.16 MG/DL (ref 0.3–1.2)
BILIRUBIN DIRECT: <0.08 MG/DL
BILIRUBIN URINE: NEGATIVE
BILIRUBIN URINE: NEGATIVE
BILIRUBIN, INDIRECT: ABNORMAL MG/DL (ref 0–1)
BNP INTERPRETATION: ABNORMAL
BUN BLDV-MCNC: 36 MG/DL (ref 8–23)
BUN BLDV-MCNC: 38 MG/DL (ref 8–23)
BUN/CREAT BLD: 14 (ref 9–20)
BUN/CREAT BLD: 14 (ref 9–20)
CALCIUM SERPL-MCNC: 7.3 MG/DL (ref 8.6–10.4)
CALCIUM SERPL-MCNC: 7.5 MG/DL (ref 8.6–10.4)
CASTS UA: ABNORMAL /LPF
CASTS UA: ABNORMAL /LPF
CHLORIDE BLD-SCNC: 92 MMOL/L (ref 98–107)
CHLORIDE BLD-SCNC: 94 MMOL/L (ref 98–107)
CO2: 32 MMOL/L (ref 20–31)
CO2: 35 MMOL/L (ref 20–31)
COLOR: YELLOW
COLOR: YELLOW
COMMENT UA: ABNORMAL
COMMENT UA: ABNORMAL
CREAT SERPL-MCNC: 2.49 MG/DL (ref 0.5–0.9)
CREAT SERPL-MCNC: 2.7 MG/DL (ref 0.5–0.9)
CRYSTALS, UA: ABNORMAL /HPF
CRYSTALS, UA: ABNORMAL /HPF
DATE, STOOL #1: NORMAL
DATE, STOOL #2: NORMAL
DATE, STOOL #3: NORMAL
DIFFERENTIAL TYPE: ABNORMAL
EOSINOPHILS RELATIVE PERCENT: 2 % (ref 1–4)
EPITHELIAL CELLS UA: ABNORMAL /HPF (ref 0–5)
EPITHELIAL CELLS UA: ABNORMAL /HPF (ref 0–5)
EXPIRATION DATE: NORMAL
GFR AFRICAN AMERICAN: 21 ML/MIN
GFR AFRICAN AMERICAN: 23 ML/MIN
GFR NON-AFRICAN AMERICAN: 17 ML/MIN
GFR NON-AFRICAN AMERICAN: 19 ML/MIN
GFR SERPL CREATININE-BSD FRML MDRD: ABNORMAL ML/MIN/{1.73_M2}
GLOBULIN: ABNORMAL G/DL (ref 1.5–3.8)
GLUCOSE BLD-MCNC: 108 MG/DL (ref 65–105)
GLUCOSE BLD-MCNC: 123 MG/DL (ref 65–105)
GLUCOSE BLD-MCNC: 130 MG/DL (ref 65–105)
GLUCOSE BLD-MCNC: 89 MG/DL (ref 70–99)
GLUCOSE BLD-MCNC: 93 MG/DL (ref 70–99)
GLUCOSE URINE: NEGATIVE
GLUCOSE URINE: NEGATIVE
HCT VFR BLD CALC: 27.3 % (ref 36–46)
HEMOCCULT SP1 STL QL: NEGATIVE
HEMOCCULT SP2 STL QL: NORMAL
HEMOCCULT SP3 STL QL: NORMAL
HEMOGLOBIN: 8.6 G/DL (ref 12–16)
IMMATURE GRANULOCYTES: ABNORMAL %
INR BLD: 1
KETONES, URINE: NEGATIVE
KETONES, URINE: NEGATIVE
LEUKOCYTE ESTERASE, URINE: ABNORMAL
LEUKOCYTE ESTERASE, URINE: ABNORMAL
LIPASE: 56 U/L (ref 13–60)
LYMPHOCYTES # BLD: 13 % (ref 24–44)
MCH RBC QN AUTO: 31.5 PG (ref 26–34)
MCHC RBC AUTO-ENTMCNC: 31.6 G/DL (ref 31–37)
MCV RBC AUTO: 99.8 FL (ref 80–100)
MONOCYTES # BLD: 9 % (ref 1–7)
MUCUS: ABNORMAL
MUCUS: ABNORMAL
NITRITE, URINE: NEGATIVE
NITRITE, URINE: POSITIVE
NRBC AUTOMATED: ABNORMAL PER 100 WBC
OTHER OBSERVATIONS UA: ABNORMAL
OTHER OBSERVATIONS UA: ABNORMAL
PARTIAL THROMBOPLASTIN TIME: 25.8 SEC (ref 23–31)
PDW BLD-RTO: 16.1 % (ref 11.5–14.5)
PH UA: 6 (ref 5–8)
PH UA: 6.5 (ref 5–8)
PLATELET # BLD: 122 K/UL (ref 130–400)
PLATELET ESTIMATE: ABNORMAL
PMV BLD AUTO: 8.8 FL (ref 6–12)
POTASSIUM SERPL-SCNC: 3.7 MMOL/L (ref 3.7–5.3)
POTASSIUM SERPL-SCNC: 4 MMOL/L (ref 3.7–5.3)
PRO-BNP: 360 PG/ML
PROTEIN UA: NEGATIVE
PROTEIN UA: NEGATIVE
PROTHROMBIN TIME: 10.7 SEC (ref 9.7–11.6)
RBC # BLD: 2.74 M/UL (ref 4–5.2)
RBC # BLD: ABNORMAL 10*6/UL
RBC UA: ABNORMAL /HPF (ref 0–2)
RBC UA: ABNORMAL /HPF (ref 0–2)
RENAL EPITHELIAL, UA: ABNORMAL /HPF
RENAL EPITHELIAL, UA: ABNORMAL /HPF
SEG NEUTROPHILS: 76 % (ref 36–66)
SEGMENTED NEUTROPHILS ABSOLUTE COUNT: 6.7 K/UL (ref 1.8–7.7)
SODIUM BLD-SCNC: 140 MMOL/L (ref 135–144)
SODIUM BLD-SCNC: 141 MMOL/L (ref 135–144)
SPECIFIC GRAVITY UA: 1.01 (ref 1–1.03)
SPECIFIC GRAVITY UA: 1.02 (ref 1–1.03)
TIME, STOOL #1: 56
TIME, STOOL #2: NORMAL
TIME, STOOL #3: NORMAL
TOTAL PROTEIN: 6.1 G/DL (ref 6.4–8.3)
TRICHOMONAS: ABNORMAL
TRICHOMONAS: ABNORMAL
TURBIDITY: ABNORMAL
TURBIDITY: CLEAR
URINE HGB: ABNORMAL
URINE HGB: ABNORMAL
UROBILINOGEN, URINE: NORMAL
UROBILINOGEN, URINE: NORMAL
WBC # BLD: 8.9 K/UL (ref 3.5–11)
WBC # BLD: ABNORMAL 10*3/UL
WBC UA: ABNORMAL /HPF (ref 0–5)
WBC UA: ABNORMAL /HPF (ref 0–5)
YEAST: ABNORMAL
YEAST: ABNORMAL

## 2018-03-08 PROCEDURE — 51798 US URINE CAPACITY MEASURE: CPT

## 2018-03-08 PROCEDURE — 85025 COMPLETE CBC W/AUTO DIFF WBC: CPT

## 2018-03-08 PROCEDURE — 36415 COLL VENOUS BLD VENIPUNCTURE: CPT

## 2018-03-08 PROCEDURE — 6370000000 HC RX 637 (ALT 250 FOR IP): Performed by: INTERNAL MEDICINE

## 2018-03-08 PROCEDURE — 87086 URINE CULTURE/COLONY COUNT: CPT

## 2018-03-08 PROCEDURE — 80076 HEPATIC FUNCTION PANEL: CPT

## 2018-03-08 PROCEDURE — 2700000000 HC OXYGEN THERAPY PER DAY

## 2018-03-08 PROCEDURE — 2580000003 HC RX 258: Performed by: INTERNAL MEDICINE

## 2018-03-08 PROCEDURE — 81001 URINALYSIS AUTO W/SCOPE: CPT

## 2018-03-08 PROCEDURE — 2500000003 HC RX 250 WO HCPCS: Performed by: EMERGENCY MEDICINE

## 2018-03-08 PROCEDURE — 80048 BASIC METABOLIC PNL TOTAL CA: CPT

## 2018-03-08 PROCEDURE — 6360000002 HC RX W HCPCS: Performed by: EMERGENCY MEDICINE

## 2018-03-08 PROCEDURE — 82947 ASSAY GLUCOSE BLOOD QUANT: CPT

## 2018-03-08 PROCEDURE — 99223 1ST HOSP IP/OBS HIGH 75: CPT | Performed by: FAMILY MEDICINE

## 2018-03-08 PROCEDURE — 6360000002 HC RX W HCPCS: Performed by: INTERNAL MEDICINE

## 2018-03-08 PROCEDURE — 85730 THROMBOPLASTIN TIME PARTIAL: CPT

## 2018-03-08 PROCEDURE — 2500000003 HC RX 250 WO HCPCS: Performed by: FAMILY MEDICINE

## 2018-03-08 PROCEDURE — 86850 RBC ANTIBODY SCREEN: CPT

## 2018-03-08 PROCEDURE — 85610 PROTHROMBIN TIME: CPT

## 2018-03-08 PROCEDURE — 94664 DEMO&/EVAL PT USE INHALER: CPT

## 2018-03-08 PROCEDURE — 83690 ASSAY OF LIPASE: CPT

## 2018-03-08 PROCEDURE — 86901 BLOOD TYPING SEROLOGIC RH(D): CPT

## 2018-03-08 PROCEDURE — 86900 BLOOD TYPING SEROLOGIC ABO: CPT

## 2018-03-08 PROCEDURE — 1200000000 HC SEMI PRIVATE

## 2018-03-08 PROCEDURE — 74176 CT ABD & PELVIS W/O CONTRAST: CPT

## 2018-03-08 PROCEDURE — G0328 FECAL BLOOD SCRN IMMUNOASSAY: HCPCS

## 2018-03-08 PROCEDURE — 51701 INSERT BLADDER CATHETER: CPT

## 2018-03-08 PROCEDURE — 87088 URINE BACTERIA CULTURE: CPT

## 2018-03-08 PROCEDURE — 83880 ASSAY OF NATRIURETIC PEPTIDE: CPT

## 2018-03-08 PROCEDURE — 87186 SC STD MICRODIL/AGAR DIL: CPT

## 2018-03-08 PROCEDURE — 94640 AIRWAY INHALATION TREATMENT: CPT

## 2018-03-08 RX ORDER — CALCITRIOL 0.25 UG/1
0.25 CAPSULE, LIQUID FILLED ORAL DAILY
Status: DISCONTINUED | OUTPATIENT
Start: 2018-03-08 | End: 2018-03-10 | Stop reason: HOSPADM

## 2018-03-08 RX ORDER — ACETAMINOPHEN 325 MG/1
650 TABLET ORAL EVERY 4 HOURS PRN
Status: DISCONTINUED | OUTPATIENT
Start: 2018-03-08 | End: 2018-03-10 | Stop reason: HOSPADM

## 2018-03-08 RX ORDER — LANOLIN ALCOHOL/MO/W.PET/CERES
1000 CREAM (GRAM) TOPICAL DAILY
Status: DISCONTINUED | OUTPATIENT
Start: 2018-03-08 | End: 2018-03-10 | Stop reason: HOSPADM

## 2018-03-08 RX ORDER — SODIUM CHLORIDE 9 MG/ML
INJECTION, SOLUTION INTRAVENOUS CONTINUOUS
Status: DISCONTINUED | OUTPATIENT
Start: 2018-03-08 | End: 2018-03-10

## 2018-03-08 RX ORDER — DEXTROSE MONOHYDRATE 25 G/50ML
12.5 INJECTION, SOLUTION INTRAVENOUS PRN
Status: DISCONTINUED | OUTPATIENT
Start: 2018-03-08 | End: 2018-03-10 | Stop reason: HOSPADM

## 2018-03-08 RX ORDER — DEXTROSE MONOHYDRATE 50 MG/ML
100 INJECTION, SOLUTION INTRAVENOUS PRN
Status: DISCONTINUED | OUTPATIENT
Start: 2018-03-08 | End: 2018-03-10 | Stop reason: HOSPADM

## 2018-03-08 RX ORDER — SODIUM CHLORIDE 0.9 % (FLUSH) 0.9 %
10 SYRINGE (ML) INJECTION EVERY 12 HOURS SCHEDULED
Status: DISCONTINUED | OUTPATIENT
Start: 2018-03-08 | End: 2018-03-10 | Stop reason: HOSPADM

## 2018-03-08 RX ORDER — SODIUM CHLORIDE 0.9 % (FLUSH) 0.9 %
10 SYRINGE (ML) INJECTION EVERY 12 HOURS SCHEDULED
Status: DISCONTINUED | OUTPATIENT
Start: 2018-03-08 | End: 2018-03-08 | Stop reason: SDUPTHER

## 2018-03-08 RX ORDER — RASAGILINE 0.5 MG/1
1 TABLET ORAL DAILY
Status: DISCONTINUED | OUTPATIENT
Start: 2018-03-08 | End: 2018-03-10 | Stop reason: HOSPADM

## 2018-03-08 RX ORDER — FLUCONAZOLE 100 MG/1
100 TABLET ORAL DAILY
Status: DISCONTINUED | OUTPATIENT
Start: 2018-03-09 | End: 2018-03-10 | Stop reason: HOSPADM

## 2018-03-08 RX ORDER — ROPINIROLE 2 MG/1
2 TABLET, FILM COATED ORAL 3 TIMES DAILY
Status: DISCONTINUED | OUTPATIENT
Start: 2018-03-08 | End: 2018-03-10 | Stop reason: HOSPADM

## 2018-03-08 RX ORDER — ATORVASTATIN CALCIUM 20 MG/1
20 TABLET, FILM COATED ORAL DAILY
Status: DISCONTINUED | OUTPATIENT
Start: 2018-03-08 | End: 2018-03-10 | Stop reason: HOSPADM

## 2018-03-08 RX ORDER — HYDROCODONE BITARTRATE AND ACETAMINOPHEN 5; 325 MG/1; MG/1
1 TABLET ORAL EVERY 4 HOURS PRN
Status: DISCONTINUED | OUTPATIENT
Start: 2018-03-08 | End: 2018-03-10 | Stop reason: HOSPADM

## 2018-03-08 RX ORDER — FLUCONAZOLE 100 MG/1
200 TABLET ORAL DAILY
Status: DISCONTINUED | OUTPATIENT
Start: 2018-03-08 | End: 2018-03-08

## 2018-03-08 RX ORDER — ONDANSETRON 2 MG/ML
4 INJECTION INTRAMUSCULAR; INTRAVENOUS EVERY 6 HOURS PRN
Status: DISCONTINUED | OUTPATIENT
Start: 2018-03-08 | End: 2018-03-10 | Stop reason: HOSPADM

## 2018-03-08 RX ORDER — PANTOPRAZOLE SODIUM 40 MG/1
40 TABLET, DELAYED RELEASE ORAL
Status: DISCONTINUED | OUTPATIENT
Start: 2018-03-08 | End: 2018-03-10 | Stop reason: HOSPADM

## 2018-03-08 RX ORDER — ACETAMINOPHEN 325 MG/1
650 TABLET ORAL EVERY 4 HOURS PRN
Status: DISCONTINUED | OUTPATIENT
Start: 2018-03-08 | End: 2018-03-08 | Stop reason: SDUPTHER

## 2018-03-08 RX ORDER — ASCORBIC ACID 500 MG
500 TABLET ORAL 2 TIMES DAILY
Status: DISCONTINUED | OUTPATIENT
Start: 2018-03-08 | End: 2018-03-10 | Stop reason: HOSPADM

## 2018-03-08 RX ORDER — SODIUM CHLORIDE 0.9 % (FLUSH) 0.9 %
10 SYRINGE (ML) INJECTION PRN
Status: DISCONTINUED | OUTPATIENT
Start: 2018-03-08 | End: 2018-03-10 | Stop reason: HOSPADM

## 2018-03-08 RX ORDER — AMIODARONE HYDROCHLORIDE 200 MG/1
200 TABLET ORAL DAILY
Status: DISCONTINUED | OUTPATIENT
Start: 2018-03-08 | End: 2018-03-10 | Stop reason: HOSPADM

## 2018-03-08 RX ORDER — ALBUTEROL SULFATE 2.5 MG/3ML
2.5 SOLUTION RESPIRATORY (INHALATION) EVERY 6 HOURS PRN
Status: DISCONTINUED | OUTPATIENT
Start: 2018-03-08 | End: 2018-03-10 | Stop reason: HOSPADM

## 2018-03-08 RX ORDER — SODIUM CHLORIDE 0.9 % (FLUSH) 0.9 %
10 SYRINGE (ML) INJECTION PRN
Status: DISCONTINUED | OUTPATIENT
Start: 2018-03-08 | End: 2018-03-08 | Stop reason: SDUPTHER

## 2018-03-08 RX ADMIN — CYANOCOBALAMIN TAB 1000 MCG 1000 MCG: 1000 TAB at 09:39

## 2018-03-08 RX ADMIN — MICONAZORB AF ANTIFUNGAL POWDER: 1.42 POWDER TOPICAL at 21:51

## 2018-03-08 RX ADMIN — CEFTRIAXONE SODIUM 1 G: 10 INJECTION, POWDER, FOR SOLUTION INTRAVENOUS at 03:31

## 2018-03-08 RX ADMIN — ROPINIROLE HYDROCHLORIDE 2 MG: 2 TABLET, FILM COATED ORAL at 21:50

## 2018-03-08 RX ADMIN — AMIODARONE HYDROCHLORIDE 200 MG: 200 TABLET ORAL at 09:39

## 2018-03-08 RX ADMIN — ALBUTEROL SULFATE 2.5 MG: 2.5 SOLUTION RESPIRATORY (INHALATION) at 09:44

## 2018-03-08 RX ADMIN — PANTOPRAZOLE SODIUM 40 MG: 40 TABLET, DELAYED RELEASE ORAL at 05:45

## 2018-03-08 RX ADMIN — CARBIDOPA AND LEVODOPA 1 TABLET: 25; 100 TABLET ORAL at 16:08

## 2018-03-08 RX ADMIN — CALCITRIOL 0.25 MCG: 0.25 CAPSULE, LIQUID FILLED ORAL at 09:39

## 2018-03-08 RX ADMIN — ROPINIROLE HYDROCHLORIDE 2 MG: 2 TABLET, FILM COATED ORAL at 09:39

## 2018-03-08 RX ADMIN — MICONAZORB AF ANTIFUNGAL POWDER: 1.42 POWDER TOPICAL at 16:06

## 2018-03-08 RX ADMIN — HYDROCODONE BITARTRATE AND ACETAMINOPHEN 1 TABLET: 5; 325 TABLET ORAL at 05:45

## 2018-03-08 RX ADMIN — Medication 500 MG: at 21:50

## 2018-03-08 RX ADMIN — METOPROLOL TARTRATE 25 MG: 25 TABLET ORAL at 09:39

## 2018-03-08 RX ADMIN — PANTOPRAZOLE SODIUM 40 MG: 40 TABLET, DELAYED RELEASE ORAL at 16:08

## 2018-03-08 RX ADMIN — CARBIDOPA AND LEVODOPA 1 TABLET: 25; 100 TABLET ORAL at 09:39

## 2018-03-08 RX ADMIN — Medication 10 ML: at 05:47

## 2018-03-08 RX ADMIN — ATORVASTATIN CALCIUM 20 MG: 20 TABLET, FILM COATED ORAL at 09:40

## 2018-03-08 RX ADMIN — Medication 500 MG: at 09:39

## 2018-03-08 RX ADMIN — CARBIDOPA AND LEVODOPA 1 TABLET: 25; 100 TABLET ORAL at 21:50

## 2018-03-08 RX ADMIN — RASAGILINE MESYLATE 1 MG: 0.5 TABLET ORAL at 09:39

## 2018-03-08 RX ADMIN — HYDROCODONE BITARTRATE AND ACETAMINOPHEN 1 TABLET: 5; 325 TABLET ORAL at 21:50

## 2018-03-08 RX ADMIN — SODIUM CHLORIDE: 9 INJECTION, SOLUTION INTRAVENOUS at 05:45

## 2018-03-08 RX ADMIN — ROPINIROLE HYDROCHLORIDE 2 MG: 2 TABLET, FILM COATED ORAL at 13:35

## 2018-03-08 RX ADMIN — ALBUTEROL SULFATE 2.5 MG: 2.5 SOLUTION RESPIRATORY (INHALATION) at 21:17

## 2018-03-08 RX ADMIN — CARBIDOPA AND LEVODOPA 1 TABLET: 25; 100 TABLET ORAL at 13:35

## 2018-03-08 ASSESSMENT — PAIN SCALES - GENERAL
PAINLEVEL_OUTOF10: 10
PAINLEVEL_OUTOF10: 5
PAINLEVEL_OUTOF10: 10
PAINLEVEL_OUTOF10: 5
PAINLEVEL_OUTOF10: 7
PAINLEVEL_OUTOF10: 9
PAINLEVEL_OUTOF10: 3
PAINLEVEL_OUTOF10: 10
PAINLEVEL_OUTOF10: 0

## 2018-03-08 ASSESSMENT — PAIN DESCRIPTION - PAIN TYPE
TYPE: CHRONIC PAIN
TYPE: ACUTE PAIN
TYPE: ACUTE PAIN
TYPE: CHRONIC PAIN

## 2018-03-08 ASSESSMENT — PAIN DESCRIPTION - LOCATION
LOCATION: HEAD
LOCATION: LEG
LOCATION: GENERALIZED
LOCATION: GENERALIZED

## 2018-03-08 ASSESSMENT — ENCOUNTER SYMPTOMS: ABDOMINAL PAIN: 1

## 2018-03-08 ASSESSMENT — PAIN DESCRIPTION - DESCRIPTORS: DESCRIPTORS: SHARP;DISCOMFORT

## 2018-03-08 ASSESSMENT — PAIN DESCRIPTION - ORIENTATION: ORIENTATION: LEFT

## 2018-03-08 ASSESSMENT — PAIN DESCRIPTION - FREQUENCY: FREQUENCY: INTERMITTENT

## 2018-03-08 NOTE — ED PROVIDER NOTES
[address] [place] [address] ED  eMERGENCY dEPARTMENT eNCOUnter      Pt Name: Garry Han  MRN: [id number]  [place] 1945  Date of evaluation: 3/7/2018  Provider: Kristina Mulligan MD    [address] [place] [address]       Chief Complaint   Patient presents with    Vaginal Bleeding         HISTORY OF PRESENT ILLNESS   (Location/Symptom, Timing/Onset, Context/Setting, Quality, Duration, Modifying Factors, Severity)  Note limiting factors. Garry Han is a 81 y.o. female who presents to the emergency department with a chief complaint of bleeding from her bladder and rectum tonight. She denies lightheadedness or dizziness. Patient is hard of hearing and some of the history is provided by her  who accompanies her. She was recently treated for cellulitis of the right lower leg initially with doxycycline, followed by Cipro which she just finished taking. Patient reports diffuse lower abdominal pain. She has a past medical history of stroke and was on Eliquis which was discontinued almost a year ago. The history is provided by the patient and the spouse. Nursing Notes were reviewed. REVIEW OF SYSTEMS    (2-9 systems for level 4, 10 or more for level 5)     Review of Systems   HENT: Positive for hearing loss. Gastrointestinal: Positive for abdominal pain. Except as noted above the remainder of the review of systems was reviewed and negative. PAST MEDICAL HISTORY     Past Medical History:   Diagnosis Date    Acute on chronic diastolic congestive heart failure (HCC)     Asthma     Atrial fibrillation (HCC)     Cataracts, bilateral     Cerebral artery occlusion with cerebral infarction [facility])     Last stroke was February 2017 w/no deficits    Chronic kidney disease     Constipation     COPD (chronic obstructive pulmonary disease) ([facility] [address]     PT. SEES DR. BECK    Diverticulosis     DM2 (diabetes mellitus, type 2) ([facility])     Full dentures     GERD (gastroesophageal reflux disease)     dye    FAMILY HISTORY       Family History   Problem Relation Age of Onset    Heart Failure Mother     Hypertension Mother     Heart Disease Mother     High Blood Pressure Mother     Asthma Other           SOCIAL HISTORY       Social History     Social History    Marital status:      Spouse name: N/A    Number of children: N/A    Years of education: N/A     Social History Main Topics    Smoking status: Former Smoker     Packs/day: 2.00     Years: 15.00     Types: Cigarettes     Quit date: 10/31/1980    Smokeless tobacco: Never Used    Alcohol use 1.2 oz/week     2 Shots of liquor per week      Comment: socially - one black E[place]an 1/week \"at dinner\"    Drug use: Yes     Types: Marijuana      Comment: once in the past 3 months - edible - no smkg d/t COPD/Asthma    Sexual activity: No      Comment: not for the past year d/t illness, denies  concerns/pain     Other Topics Concern    None     Social History Narrative    None       SCREENINGS             PHYSICAL EXAM    (up to 7 for level 4, 8 or more for level 5)     ED Triage Vitals [03/08/18 0013]   BP Temp Temp src Pulse Resp SpO2 Height Weight   (!) 109/59 97.4 °F (36.3 °C) -- 77 19 98 % 5' 1\" (1.549 m) 180 lb (81.6 kg)       Physical Exam   Constitutional: She is oriented to person, place, and time. She appears well-developed and well-nourished. She appears distressed. HENT:   Head: Normocephalic. Left Ear: External ear normal.   Nose: Nose normal.   Mouth/Throat: Oropharynx is clear and moist.   Eyes: Pupils are equal, round, and reactive to light. Neck: Neck supple. Cardiovascular: Normal rate and regular rhythm. Murmur (heard best in the aortic area.) heard. Systolic murmur is present with a grade of 3/6   Pulmonary/Chest: Effort normal and breath sounds normal. She has no rhonchi. She has no rales. Abdominal: Normal appearance. She exhibits distension. She exhibits no fluid wave and no ascites. Bowel sounds are decreased. the following:     Pro- (*)     All other components within normal limits   CBC WITH AUTO DIFFERENTIAL - Abnormal; Notable for the following:     RBC 2.74 (*)     Hemoglobin 8.6 (*)     Hematocrit 27.3 (*)     RDW 16.1 (*)     Platelets 394 (*)     Seg Neutrophils 76 (*)     Lymphocytes 13 (*)     Monocytes 9 (*)     All other components within normal limits   HEPATIC FUNCTION PANEL - Abnormal; Notable for the following: Total Bilirubin 0.16 (*)     Total Protein 6.1 (*)     All other components within normal limits   UA W/REFLEX CULTURE - Abnormal; Notable for the following:     Urine Hgb 3+ (*)     Nitrite, Urine POSITIVE (*)     Leukocyte Esterase, Urine SMALL (*)     All other components within normal limits   MICROSCOPIC URINALYSIS - Abnormal; Notable for the following:     Bacteria, UA MANY (*)     All other components within normal limits   URINE CULTURE CLEAN CATCH   APTT   PROTIME-INR   LIPASE   OCCULT BLOOD SCREEN   POCT URINALYSIS DIPSTICK   TYPE AND SCREEN       All other labs were within normal range or not returned as of this dictation. EMERGENCY DEPARTMENT COURSE and DIFFERENTIAL DIAGNOSIS/MDM:   Vitals:    Vitals:    03/08/18 0013 03/08/18 0055 03/08/18 0226   BP: (!) 109/59 (!) 140/41    Pulse: 77  68   Resp: 19 17 16   Temp: 97.4 °F (36.3 °C)     SpO2: 98%  100%   Weight: 180 lb (81.6 kg)     Height: 5' 1\" (1.549 m)         Rectal examination revealed brown colored stool which was heme negative. Blood is coming out of the vagina. There is dark and small clots present. The urinary bladder was catheterized and the urine shows signs of infection but that she does not have gross hematuria. CT scan of the abdomen reports a uterine fibroid with calcification. Patient does not have the description of a surgical abdomen on this CT scan. Her kidney function has significantly worsened over baseline.   Patient was administered IV Rocephin for her urinary tract infection the ED and has

## 2018-03-08 NOTE — PLAN OF CARE
Problem: Nutrition  Goal: Optimal nutrition therapy  Nutrition Problem: Inadequate oral intake  Intervention: Food and/or Nutrient Delivery: Continue current diet, Start ONS  Nutritional Goals: PO intake > 75% of estimated kcal/protein needs with good GI tolerance    Outcome: Ongoing

## 2018-03-08 NOTE — PROGRESS NOTES
Inter-dry applied to patients groin/abd fold area. Skin cleansed with warm soapy water and dried well before application of moisture wicking cloth. Nystatin powder ordered to be applied BID.

## 2018-03-08 NOTE — PROGRESS NOTES
Nutrition Assessment    Type and Reason for Visit: Initial, Consult    Nutrition Recommendations: 1. Continue with General diet. 2. Order pt Glucerna at B + D. 3. Monitor need for renal dietary restrictions. 4. Will monitor for possible chewing/swallowing difficulties and adjust as needed. Malnutrition Assessment:  · Malnutrition Status: At risk for malnutrition  · Context: Acute illness or injury  · Findings of the 6 clinical characteristics of malnutrition (Minimum of 2 out of 6 clinical characteristics is required to make the diagnosis of moderate or severe Protein Calorie Malnutrition based on AND/ASPEN Guidelines):  1. Energy Intake-Not available, not able to assess    2. Weight Loss-5% loss or greater,  (in 7 months)  3. Fat Loss-Unable to assess,    4. Muscle Loss-Unable to assess,    5. Fluid Accumulation-Mild fluid accumulation, Extremities  6.  Strength-Not measured    Nutrition Diagnosis:   · Problem: Inadequate oral intake  · Etiology: related to Insufficient energy/nutrient consumption     Signs and symptoms:  as evidenced by Diet history of poor intake, Intake 25-50% (h/o Parksinsons)    Nutrition Assessment:  · Subjective Assessment:  Pt is in restroom at time of visit. Spoke with nrusing ( Jackelin) at this time. [facility] states pt is eating okay, and does not appear to have any chewing/swallowing difficulty. Reviewed previous admission and pt is reported to have intermittent swallowing problems related to Parkinson's disease, however is able to tolerate a general consistency diet. Note: +Wt loss: 10 lb (4.5 kg) 5.3% x 7 months.   · Nutrition-Focused Physical Findings: GI: WDL, +rounded, +passing flatus, +active bowel sounds, +redness/discoloration: RUE/LUE, +skin: scattered sores noted; picks at skin, edema:RLE: non-pitting, +1, LLE: non-pitting, trace  · Wound Type: None  · Current Nutrition Therapies:  · Oral Diet Orders: General   · Oral Diet intake: 51-75%  · Oral Nutrition Supplement

## 2018-03-08 NOTE — PROGRESS NOTES
Occupational Therapy  [facility]  Occupational Therapy Not Seen Note    Patient not available for Occupational Therapy due to:    [] Testing:    [] Hemodialysis    [] Cancelled by RN:    []Refusal by Patient:    [] Surgery:     [] Intubation:     [] Pain Medication:    [] Sedation:     [] Spine Precautions :    [] Medical Instability:    [x] Other: Pt with doctor; will try 3/9    Nenita Stoner, OTR/L

## 2018-03-08 NOTE — PROGRESS NOTES
Pharmacy Accuracy Service Medication History Note    The patient's list of current home medications has been reviewed. Source(s) of information: Patient/SureScripts    Based on information provided by the above source(s), I have updated the patient's home med list as described below. Please review the ACTION REQUESTED BY PHYSICIAN section of this note for any discrepancies on current hospital orders. I changed or updated the following medications on the patient's home medication list:  Discontinued · Duplicate Breo order  · Prednisone taper - filled for 30 days on 1/12/18     Adjusted   · Metoprolol tartrate 25mg - 25mg BID adjusted sig to 12.5mg BID          PHYSICIAN ACTION REQUESTED  Discrepancies on current hospital orders that need to be addressed by a physician:    Medication Action Requested     Metoprolol 25mg   Pt takes 12.5mg (one-half tab) BID; ordered as 25mg BID; please reconcile and adjust if appropriate. Please feel free to call me with any questions about this encounter. Thank you.     This note will be reviewed and co-signed by the Medication Accuracy Pharmacist.    Jhony Dennison, PharmD student  Pharmacy Medication Accuracy Review Service  Phone:  [phone]  Fax: [phone]      Electronically signed by Jhony Dennison on 3/8/2018 at 4:15 PM

## 2018-03-08 NOTE — CONSULTS
Department of Gynecology  Attending Consult Note      Reason for Consult:  Vaginal bleeding    CHIEF COMPLAINT:   bleeding    History obtained from patient    HISTORY OF PRESENT ILLNESS:                   The patient is a 81 y.o. female with significant past medical history of chronic kidney disease who presents with vaginal bleeding since Tuesday. Not painful. Past Medical History:        Diagnosis Date    Acute on chronic diastolic congestive heart failure (HCC)     Asthma     Atrial fibrillation (HCC)     Cataracts, bilateral     Cerebral artery occlusion with cerebral infarction [facility])     Last stroke was February 2017 w/no deficits    Chronic kidney disease     Constipation     COPD (chronic obstructive pulmonary disease) ([facility] [address]     PT. SEES DR. BECK    Diverticulosis     DM2 (diabetes mellitus, type 2) ([facility]     Full dentures     GERD (gastroesophageal reflux disease)     Headache(784.0)     Hyperlipidemia     Hyperplastic polyp of intestine     Hypertension     PT.  DOES NOT SEE A CARDIOLOGIST    Morbid obesity with BMI of 40.0-44.9, adult ([facility] 12/30/2016    ECTOR on CPAP     Osteoarthritis     Parkinson disease ([facility]     Spinal stenosis in cervical region 6/2/2013    Type II or unspecified type diabetes mellitus without mention of complication, not stated as uncontrolled     Unspecified sleep apnea     cpap nightly    Wears glasses      Past Surgical History:        Procedure Laterality Date    ABDOMEN SURGERY      APPENDECTOMY      BACK SURGERY      CATARACT REMOVAL WITH IMPLANT Bilateral 2007   [address] SURGERY  2012    CERVICAL SPINE SURGERY  5/31/13    posterior [id number]    COLONOSCOPY  2009    COLONOSCOPY  12/29/2016    incomplete was not cleaned out    COLONOSCOPY  12/30/2016    COLONOSCOPY  04/25/2017     SIGMOID COLON POLYPECTOMY:  HYPERPLASTIC POLYP ,   DIVERTICULOSIS    EYE SURGERY Bilateral     CATARACTS W/ IOL    HERNIA REPAIR  1999    VENTRAL    Will be happy to in office for full pelvic with pap if desired    Discussed above with Dr. Bobbi Hess  Will sign off for now

## 2018-03-08 NOTE — CARE COORDINATION
Case Management Initial Discharge Plan  Geoffrey Magana,         Readmission Risk              Readmission Risk:        25       Age 74 or Greater:  1    Admitted from SNF or Requires Paid or Family Care:  0    Currently has CHF,COPD,ARF,CRI,or is on dialysis:  4    Takes more than 5 Prescription Medications:  4    Takes Digoxin,Insulin,Anticoagulants,Narcotics or ASA/Plavix:  [address] in Past 12 Months:  10    On Disability:  0    Patient Considers own Health:  5            Met with:patient to discuss discharge plans. Information verified: address, contacts, phone number, , insurance Yes  PCP: Leslie De Leon DO  Date of last visit: last week    Insurance Provider: [address], Medicare    Discharge Planning  Current Residence:   Private residence   Living Arrangements:  Spouse/Significant Other, Children   Home has 1 stories/0 stairs to climb  Support Systems:  Spouse/Significant Other, Children  Current Services PTA:    Supplier: none  Patient able to perform ADL's:Independent  DME used to aid ambulation prior to admission: walker, oxgen 24/7 at 2L, CPAP, cane, shower chair, nebulizer/during admission - oxygen, walker    Potential Assistance Needed:  [address]: Anel Bennett and Kevon   Potential Assistance Purchasing Medications:  No  Does patient want to participate in local refill/ meds to beds program?  N/A    Patient agreeable to home care: No  [place] of choice provided:  n/a      Type of Home Care Services:  None  Patient expects to be discharged to:  home    Prior SNF/Rehab Placement and Facility: [address] [address] to SNF/Rehab: No  [place] of choice provided: n/a   Evaluation: n/a    Expected Discharge date:  03/10/18  Follow Up Appointment: Best Day/ Time: Wednesday AM    Transportation provider:   Transportation arrangements needed for discharge: Yes    Discharge Plan: Met with patient at bedside.   Lives with  and

## 2018-03-08 NOTE — PLAN OF CARE
Problem: Falls - Risk of  Goal: Absence of falls  Outcome: Ongoing  Fall risk assessment completed. Patient instructed to use call light. Bed locked and in lowest position, side rails up 2/4, call light and bedside table within reach, clutter removed, and non-skid footwear on when pt out of bed. Bed alarm. Hourly rounds will continue. Problem: Discharge Planning:  Goal: Discharged to appropriate level of care  Discharged to appropriate level of care   Outcome: Ongoing  Patient agreeable to home care with skilled nursing services. PT/OT working with patient during admission. Problem:  Activity:  Goal: Will verbalize the importance of balancing activity with adequate rest periods  Will verbalize the importance of balancing activity with adequate rest periods   Outcome: Ongoing

## 2018-03-08 NOTE — PROGRESS NOTES
Patient arrived to the unit, oriented to the room, vital signs taken, assessment performed, goals, and orders reviewed. Patient states, \"My  knows my medications well, I do not know all the stuff I take. \"

## 2018-03-09 ENCOUNTER — APPOINTMENT (OUTPATIENT)
Dept: GENERAL RADIOLOGY | Age: 73
DRG: 683 | End: 2018-03-09
Payer: COMMERCIAL

## 2018-03-09 ENCOUNTER — APPOINTMENT (OUTPATIENT)
Dept: ULTRASOUND IMAGING | Age: 73
DRG: 683 | End: 2018-03-09
Payer: COMMERCIAL

## 2018-03-09 LAB
ANION GAP SERPL CALCULATED.3IONS-SCNC: 13 MMOL/L (ref 9–17)
BUN BLDV-MCNC: 28 MG/DL (ref 8–23)
BUN/CREAT BLD: 15 (ref 9–20)
CALCIUM SERPL-MCNC: 6.7 MG/DL (ref 8.6–10.4)
CHLORIDE BLD-SCNC: 95 MMOL/L (ref 98–107)
CO2: 34 MMOL/L (ref 20–31)
CREAT SERPL-MCNC: 1.85 MG/DL (ref 0.5–0.9)
CREATININE URINE: 114.2 MG/DL (ref 28–217)
CULTURE: ABNORMAL
CULTURE: NO GROWTH
CULTURE: NORMAL
GFR AFRICAN AMERICAN: 33 ML/MIN
GFR NON-AFRICAN AMERICAN: 27 ML/MIN
GFR SERPL CREATININE-BSD FRML MDRD: ABNORMAL ML/MIN/{1.73_M2}
GFR SERPL CREATININE-BSD FRML MDRD: ABNORMAL ML/MIN/{1.73_M2}
GLUCOSE BLD-MCNC: 126 MG/DL (ref 65–105)
GLUCOSE BLD-MCNC: 132 MG/DL (ref 65–105)
GLUCOSE BLD-MCNC: 140 MG/DL (ref 65–105)
GLUCOSE BLD-MCNC: 144 MG/DL (ref 70–99)
GLUCOSE BLD-MCNC: 149 MG/DL (ref 65–105)
HCT VFR BLD CALC: 24.9 % (ref 36–46)
HEMOGLOBIN: 8.1 G/DL (ref 12–16)
Lab: ABNORMAL
Lab: NORMAL
MCH RBC QN AUTO: 32.5 PG (ref 26–34)
MCHC RBC AUTO-ENTMCNC: 32.6 G/DL (ref 31–37)
MCV RBC AUTO: 99.5 FL (ref 80–100)
NRBC AUTOMATED: ABNORMAL PER 100 WBC
ORGANISM: ABNORMAL
PDW BLD-RTO: 16 % (ref 11.5–14.5)
PLATELET # BLD: 113 K/UL (ref 130–400)
PMV BLD AUTO: 8.6 FL (ref 6–12)
POTASSIUM SERPL-SCNC: 3.5 MMOL/L (ref 3.7–5.3)
RBC # BLD: 2.5 M/UL (ref 4–5.2)
SODIUM BLD-SCNC: 142 MMOL/L (ref 135–144)
SODIUM,UR: 59 MMOL/L
SPECIMEN DESCRIPTION: ABNORMAL
SPECIMEN DESCRIPTION: ABNORMAL
SPECIMEN DESCRIPTION: NORMAL
SPECIMEN DESCRIPTION: NORMAL
STATUS: ABNORMAL
STATUS: NORMAL
WBC # BLD: 7.1 K/UL (ref 3.5–11)

## 2018-03-09 PROCEDURE — G8987 SELF CARE CURRENT STATUS: HCPCS

## 2018-03-09 PROCEDURE — 2700000000 HC OXYGEN THERAPY PER DAY

## 2018-03-09 PROCEDURE — 97166 OT EVAL MOD COMPLEX 45 MIN: CPT

## 2018-03-09 PROCEDURE — 6360000002 HC RX W HCPCS: Performed by: INTERNAL MEDICINE

## 2018-03-09 PROCEDURE — 6360000002 HC RX W HCPCS: Performed by: FAMILY MEDICINE

## 2018-03-09 PROCEDURE — 97535 SELF CARE MNGMENT TRAINING: CPT

## 2018-03-09 PROCEDURE — 97162 PT EVAL MOD COMPLEX 30 MIN: CPT

## 2018-03-09 PROCEDURE — 36415 COLL VENOUS BLD VENIPUNCTURE: CPT

## 2018-03-09 PROCEDURE — 99232 SBSQ HOSP IP/OBS MODERATE 35: CPT | Performed by: FAMILY MEDICINE

## 2018-03-09 PROCEDURE — 84300 ASSAY OF URINE SODIUM: CPT

## 2018-03-09 PROCEDURE — G8988 SELF CARE GOAL STATUS: HCPCS

## 2018-03-09 PROCEDURE — 2580000003 HC RX 258: Performed by: INTERNAL MEDICINE

## 2018-03-09 PROCEDURE — 82947 ASSAY GLUCOSE BLOOD QUANT: CPT

## 2018-03-09 PROCEDURE — 97530 THERAPEUTIC ACTIVITIES: CPT

## 2018-03-09 PROCEDURE — 2500000003 HC RX 250 WO HCPCS: Performed by: FAMILY MEDICINE

## 2018-03-09 PROCEDURE — 6370000000 HC RX 637 (ALT 250 FOR IP): Performed by: FAMILY MEDICINE

## 2018-03-09 PROCEDURE — 76775 US EXAM ABDO BACK WALL LIM: CPT

## 2018-03-09 PROCEDURE — 6370000000 HC RX 637 (ALT 250 FOR IP): Performed by: INTERNAL MEDICINE

## 2018-03-09 PROCEDURE — 74018 RADEX ABDOMEN 1 VIEW: CPT

## 2018-03-09 PROCEDURE — 6370000000 HC RX 637 (ALT 250 FOR IP): Performed by: UROLOGY

## 2018-03-09 PROCEDURE — G8978 MOBILITY CURRENT STATUS: HCPCS

## 2018-03-09 PROCEDURE — 94760 N-INVAS EAR/PLS OXIMETRY 1: CPT

## 2018-03-09 PROCEDURE — 94640 AIRWAY INHALATION TREATMENT: CPT

## 2018-03-09 PROCEDURE — 80048 BASIC METABOLIC PNL TOTAL CA: CPT

## 2018-03-09 PROCEDURE — 1200000000 HC SEMI PRIVATE

## 2018-03-09 PROCEDURE — G8979 MOBILITY GOAL STATUS: HCPCS

## 2018-03-09 PROCEDURE — 82570 ASSAY OF URINE CREATININE: CPT

## 2018-03-09 PROCEDURE — 6370000000 HC RX 637 (ALT 250 FOR IP): Performed by: NURSE PRACTITIONER

## 2018-03-09 PROCEDURE — 85027 COMPLETE CBC AUTOMATED: CPT

## 2018-03-09 RX ORDER — POTASSIUM CHLORIDE 20 MEQ/1
20 TABLET, EXTENDED RELEASE ORAL ONCE
Status: COMPLETED | OUTPATIENT
Start: 2018-03-09 | End: 2018-03-09

## 2018-03-09 RX ORDER — SENNA PLUS 8.6 MG/1
2 TABLET ORAL NIGHTLY
Status: DISCONTINUED | OUTPATIENT
Start: 2018-03-09 | End: 2018-03-10 | Stop reason: HOSPADM

## 2018-03-09 RX ADMIN — METOPROLOL TARTRATE 12.5 MG: 25 TABLET ORAL at 20:38

## 2018-03-09 RX ADMIN — ROPINIROLE HYDROCHLORIDE 2 MG: 2 TABLET, FILM COATED ORAL at 20:45

## 2018-03-09 RX ADMIN — HYDROCODONE BITARTRATE AND ACETAMINOPHEN 1 TABLET: 5; 325 TABLET ORAL at 10:11

## 2018-03-09 RX ADMIN — POTASSIUM CHLORIDE 20 MEQ: 20 TABLET, EXTENDED RELEASE ORAL at 17:24

## 2018-03-09 RX ADMIN — FLUCONAZOLE 100 MG: 100 TABLET ORAL at 08:24

## 2018-03-09 RX ADMIN — ATORVASTATIN CALCIUM 20 MG: 20 TABLET, FILM COATED ORAL at 08:24

## 2018-03-09 RX ADMIN — Medication 500 MG: at 08:24

## 2018-03-09 RX ADMIN — INSULIN LISPRO 2 UNITS: 100 INJECTION, SOLUTION INTRAVENOUS; SUBCUTANEOUS at 17:24

## 2018-03-09 RX ADMIN — ALBUTEROL SULFATE 2.5 MG: 2.5 SOLUTION RESPIRATORY (INHALATION) at 20:09

## 2018-03-09 RX ADMIN — CYANOCOBALAMIN TAB 1000 MCG 1000 MCG: 1000 TAB at 08:24

## 2018-03-09 RX ADMIN — SODIUM CHLORIDE: 9 INJECTION, SOLUTION INTRAVENOUS at 01:40

## 2018-03-09 RX ADMIN — CARBIDOPA AND LEVODOPA 1 TABLET: 25; 100 TABLET ORAL at 20:45

## 2018-03-09 RX ADMIN — CARBIDOPA AND LEVODOPA 1 TABLET: 25; 100 TABLET ORAL at 17:24

## 2018-03-09 RX ADMIN — CARBIDOPA AND LEVODOPA 1 TABLET: 25; 100 TABLET ORAL at 08:24

## 2018-03-09 RX ADMIN — AMIODARONE HYDROCHLORIDE 200 MG: 200 TABLET ORAL at 08:24

## 2018-03-09 RX ADMIN — CEFTRIAXONE SODIUM 1 G: 10 INJECTION, POWDER, FOR SOLUTION INTRAVENOUS at 03:26

## 2018-03-09 RX ADMIN — PANTOPRAZOLE SODIUM 40 MG: 40 TABLET, DELAYED RELEASE ORAL at 05:09

## 2018-03-09 RX ADMIN — METOPROLOL TARTRATE 25 MG: 25 TABLET ORAL at 08:24

## 2018-03-09 RX ADMIN — MICONAZORB AF ANTIFUNGAL POWDER: 1.42 POWDER TOPICAL at 20:45

## 2018-03-09 RX ADMIN — Medication 500 MG: at 20:45

## 2018-03-09 RX ADMIN — CALCITRIOL 0.25 MCG: 0.25 CAPSULE, LIQUID FILLED ORAL at 08:24

## 2018-03-09 RX ADMIN — RASAGILINE MESYLATE 1 MG: 0.5 TABLET ORAL at 08:24

## 2018-03-09 RX ADMIN — ROPINIROLE HYDROCHLORIDE 2 MG: 2 TABLET, FILM COATED ORAL at 08:24

## 2018-03-09 RX ADMIN — PANTOPRAZOLE SODIUM 40 MG: 40 TABLET, DELAYED RELEASE ORAL at 17:24

## 2018-03-09 RX ADMIN — CONJUGATED ESTROGENS 0.5 G: 0.62 CREAM VAGINAL at 00:13

## 2018-03-09 RX ADMIN — INSULIN LISPRO 1 UNITS: 100 INJECTION, SOLUTION INTRAVENOUS; SUBCUTANEOUS at 22:18

## 2018-03-09 RX ADMIN — MICONAZORB AF ANTIFUNGAL POWDER: 1.42 POWDER TOPICAL at 08:23

## 2018-03-09 RX ADMIN — CARBIDOPA AND LEVODOPA 1 TABLET: 25; 100 TABLET ORAL at 14:12

## 2018-03-09 RX ADMIN — SENNOSIDES 17.2 MG: 8.6 TABLET, FILM COATED ORAL at 14:12

## 2018-03-09 RX ADMIN — CONJUGATED ESTROGENS 0.5 G: 0.62 CREAM VAGINAL at 17:28

## 2018-03-09 RX ADMIN — ROPINIROLE HYDROCHLORIDE 2 MG: 2 TABLET, FILM COATED ORAL at 14:12

## 2018-03-09 ASSESSMENT — PAIN SCALES - GENERAL
PAINLEVEL_OUTOF10: 10
PAINLEVEL_OUTOF10: 10
PAINLEVEL_OUTOF10: 6

## 2018-03-09 ASSESSMENT — PAIN DESCRIPTION - PAIN TYPE: TYPE: CHRONIC PAIN

## 2018-03-09 NOTE — PROGRESS NOTES
[address] [facility]    Second Visit Note  For more detailed information please refer to the progress note of the day      3/8/2018    11:28 PM    Name:   Yessenia Sheth  MRN:     [id number]     Acct:      [de-identified]   Room:   2010/2010-02  IP Day:  0  Admit Date:  3/7/2018 11:58 PM    PCP:   Sabrina Tripp DO  Code Status:  Full Code        Pt vitals were reviewed   New labs were reviewed   Patient was seen    Updated plan :   Discuss with gynecologist agrees with our management and will continue current care.       Jamar Rizo MD  3/8/2018  11:28 PM

## 2018-03-09 NOTE — PROGRESS NOTES
stated as uncontrolled; Unspecified sleep apnea; Urethral caruncle; and Wears glasses. has a past surgical history that includes Cervical disc surgery (2012); Appendectomy; Tonsillectomy and adenoidectomy; Cataract removal with implant (Bilateral, 2007); hernia repair (1999); eye surgery (Bilateral); Cervical spine surgery (5/31/13); laminectomy (5/31/13); Abdomen surgery; back surgery; Upper gastrointestinal endoscopy (12/28/2016); Colonoscopy (2009); Colonoscopy (12/29/2016); Colonoscopy (12/30/2016); Colonoscopy (04/25/2017); and pr colsc flx w/removal lesion by hot bx forceps (N/A, 4/25/2017). Restrictions  Restrictions/Precautions  Restrictions/Precautions: General Precautions, Fall Risk  Position Activity Restriction  Other position/activity restrictions: Patient impulsive  Vision/Hearing  Hearing: Exceptions to [facility]  Hearing Exceptions: Hard of hearing/hearing concerns     Subjective  General  Chart Reviewed: Yes  Patient assessed for rehabilitation services?: Yes  Additional Pertinent Hx: Cellulitis, parkinson, CVA  Response To Previous Treatment: Not applicable  Family / Caregiver Present: Yes ()  Diagnosis: KRISTIN, Acute cystitis, acute respiratory insufficiency  Follows Commands: Impaired (Patient follows commands when given, but unable to retain the information more than a few minutes. )  General Comment  Comments: Nursing reports patient appropriate for PT. Subjective  Subjective: Patient very pleasant and agreeable,  present and assisted answering questions.   Pain Screening  Patient Currently in Pain: Yes  Pain Assessment  Pain Assessment: 0-10  Pain Level: 10 (all over, but patient appears comfortable and in good spirits.)  Pain Type: Chronic pain  Pain Intervention(s): Repositioned  Response to Pain Intervention: Patient Satisfied  Vital Signs  Patient Currently in Pain: Yes  Pre Treatment Pain Screening  Intervention List: Patient able to continue with

## 2018-03-09 NOTE — PROGRESS NOTES
[facility]    Progress Note    3/9/2018    8:26 AM    Name:   Angela Vaca  MRN:     [id number]     Acct:      [de-identified]   Room:   2010/2010-02   Day:  1  Admit Date:  3/7/2018 11:58 PM    PCP:   Fernando Diaz DO  Code Status:  Full Code    Subjective:     C/C:   Chief Complaint   Patient presents with    Vaginal Bleeding     Interval History Status: improved. Patient urine shows E. coli and abdominal CT shows renal cyst.  A 1.7 cm and a lot of stool. She also has nephrolithiasis. Patient states that she feels much better. Brief History: This is a 31-year-old female with multiple medical problems. States that she has been having vaginal bleeding and she had a CT scan of the abdomen and pelvis that showed 5 mm of renal calculi. Urinalysis also showed positive blood in the urine and nitrite positive and in view of her postmenopausal bleeding. She was referred to gynecology and admitted for further management. She denies any significant bleeding from the vagina and denies any hematochezia. She has no recent weight loss. No fever, chills, nausea, vomiting or any diarrhea.     In the ER, she was noted to have 3+ hemoglobin in the urine and positive nitrite and leukocyte esterase. And also many bacteria. She was started on Rocephin in the emergency room. Review of Systems:     Constitutional:  negative for chills, fevers, sweats  Respiratory:  negative for cough, dyspnea on exertion, shortness of breath, wheezing  Cardiovascular:  negative for chest pain, chest pressure/discomfort, lower extremity edema, palpitations  Gastrointestinal:  negative for abdominal pain, constipation, diarrhea, nausea, vomiting  Neurological:  negative for dizziness, headache    Medications: Allergies:     Allergies   Allergen Reactions    Dye [Barium-Containing Compounds] Other (See Comments)     Cause Afib per     Pcn [Penicillins] Itching stenosis in cervical region; Type II or unspecified type diabetes mellitus without mention of complication, not stated as uncontrolled; Unspecified sleep apnea; Urethral caruncle; and Wears glasses. Social History:   reports that she quit smoking about 37 years ago. Her smoking use included Cigarettes. She has a 30.00 pack-year smoking history. She has never used smokeless tobacco. She reports that she drinks about 1.2 oz of alcohol per week . She reports that she does not use drugs. Family History:   Family History   Problem Relation Age of Onset    Heart Failure Mother     Hypertension Mother     Heart Disease Mother     High Blood Pressure Mother     Asthma Other        Vitals:  BP (!) 130/47   Pulse 77   Temp 98.1 °F (36.7 °C) (Oral)   Resp 20   Ht 5' 1\" (1.549 m)   Wt 180 lb (81.6 kg)   SpO2 100%   BMI 34.01 kg/m²   Temp (24hrs), Av.3 °F (36.8 °C), Min:98.1 °F (36.7 °C), Max:98.4 °F (36.9 °C)    Recent Labs      18   1214  18   1606  18   2112  18   0627   POCGLU  108*  123*  130*  126*       I/O (24Hr):     Intake/Output Summary (Last 24 hours) at 18 0826  Last data filed at 18 0511   Gross per 24 hour   Intake           1873.7 ml   Output             1370 ml   Net            503.7 ml       Labs:    Hematology:  Recent Labs      18   0106  18   0600   WBC  8.9  7.1   RBC  2.74*  2.50*   HGB  8.6*  8.1*   HCT  27.3*  24.9*   MCV  99.8  99.5   MCH  31.5  32.5   MCHC  31.6  32.6   RDW  16.1*  16.0*   PLT  122*  113*   MPV  8.8  8.6   INR  1.0   --      Chemistry:  Recent Labs      18   0106  18   0546  18   0600   NA  140  141  142   K  4.0  3.7  3.5*   CL  92*  94*  95*   CO2  32*  35*  34*   GLUCOSE  89  93  144*   BUN  38*  36*  28*   CREATININE  2.70*  2.49*  1.85*   ANIONGAP  16  12  13   LABGLOM  17*  19*  27*   GFRAA  21*  23*  33*   CALCIUM  7.5*  7.3*  6.7*   PROBNP  360*   --    --      Recent Labs      18

## 2018-03-09 NOTE — H&P
[facility]    HISTORY AND PHYSICAL EXAMINATION            Date:   3/8/2018  Patient name:  Francisco Li  Date of admission:  3/7/2018 11:58 PM  MRN:   [id number]  Account:  [de-identified]  YOB: 1945  PCP:    Som Romo DO  Room:   2010/2010-02  Code Status:    Full Code    Chief Complaint:     Chief Complaint   Patient presents with    Vaginal Bleeding       History Obtained From:     patient, electronic medical record    History of Present Illness: This is a 65-year-old female with multiple medical problems. States that she has been having vaginal bleeding and she had a CT scan of the abdomen and pelvis that showed 5 mm of renal calculi. Urinalysis also showed positive blood in the urine and nitrite positive and in view of her postmenopausal bleeding. She was referred to gynecology and admitted for further management. She denies any significant bleeding from the vagina and denies any hematochezia. She has no recent weight loss. No fever, chills, nausea, vomiting or any diarrhea. In the ER, she was noted to have 3+ hemoglobin in the urine and positive nitrite and leukocyte esterase. And also many bacteria. She was started on Rocephin in the emergency room. Past Medical History:     Past Medical History:   Diagnosis Date    Acute on chronic diastolic congestive heart failure (HCC)     Asthma     Atrial fibrillation (HCC)     Cataracts, bilateral     Cerebral artery occlusion with cerebral infarction [facility])     Last stroke was February 2017 w/no deficits    Chronic kidney disease     Constipation     [facility]     PT. SEES DR. BECK    Diverticulosis     DM2 (diabetes mellitus, type 2) ([facility]     Full dentures     GERD (gastroesophageal reflux disease)     Headache(784.0)     Hyperlipidemia     Hyperplastic polyp of intestine     Hypertension     PT.  DOES NOT SEE A CARDIOLOGIST    Morbid obesity with BMI of 40.0-44.9, adult ([facility] 12/30/2016    ECTOR on CPAP     Osteoarthritis     Parkinson disease ([facility]     Spinal stenosis in cervical region 6/2/2013    Type II or unspecified type diabetes mellitus without mention of complication, not stated as uncontrolled     Unspecified sleep apnea     cpap nightly    Urethral caruncle 3/8/2018    Wears glasses         Past Surgical History:     Past Surgical History:   Procedure Laterality Date    ABDOMEN SURGERY      APPENDECTOMY      BACK SURGERY      CATARACT REMOVAL WITH IMPLANT Bilateral 2007   [address] SURGERY  2012    CERVICAL SPINE SURGERY  5/31/13    posterior c5-t1    COLONOSCOPY  2009    COLONOSCOPY  12/29/2016    incomplete was not cleaned out    COLONOSCOPY  12/30/2016    COLONOSCOPY  04/25/2017     SIGMOID COLON POLYPECTOMY:  HYPERPLASTIC POLYP ,   DIVERTICULOSIS    EYE SURGERY Bilateral     CATARACTS W/ IOL    HERNIA REPAIR  1999    VENTRAL    LAMINECTOMY  5/31/13    Dr. Fernandez Born FLX W/REMOVAL LESION BY HOT BX FORCEPS N/A 4/25/2017    COLONOSCOPY POLYPECTOMY HOT BIOPSY performed by Sheba Hyman MD at [facility] ENDOSCOPY  12/28/2016    gastritis, esophagitis,         Medications Prior to Admission:     Prior to Admission medications    Medication Sig Start Date End Date Taking? Authorizing Provider   aspirin 81 MG tablet Take 81 mg by mouth daily   Yes Historical Provider, MD   metoprolol tartrate (LOPRESSOR) 25 MG tablet Take 12.5 mg by mouth 2 times daily   Yes Historical Provider, MD JAY  MG per tablet TAKE 1 TABLET TWICE A DAY WITH MEALS 2/28/18  Yes Lesly Hill,    HYDROcodone-acetaminophen (NORCO) 5-325 MG per tablet Take 1 tablet by mouth 2 times daily as needed for Pain for up to 30 days.  2/26/18 3/28/18 Yes Lesly Hill, DO   carbidopa-levodopa (SINEMET)  MG per tablet Take 1 tablet by mouth 4 Protime 10.7 9.7 - 11.6 sec    INR 1.0    Basic Metabolic Prof    Collection Time: 03/08/18  1:06 AM   Result Value Ref Range    Glucose 89 70 - 99 mg/dL    BUN 38 (H) 8 - 23 mg/dL    CREATININE 2.70 (H) 0.50 - 0.90 mg/dL    Bun/Cre Ratio 14 9 - 20    Calcium 7.5 (L) 8.6 - 10.4 mg/dL    Sodium 140 135 - 144 mmol/L    Potassium 4.0 3.7 - 5.3 mmol/L    Chloride 92 (L) 98 - 107 mmol/L    CO2 32 (H) 20 - 31 mmol/L    Anion Gap 16 9 - 17 mmol/L    GFR Non-African American 17 (L) >60 mL/min    GFR  21 (L) >60 mL/min    GFR Comment          GFR Staging NOT REPORTED    Brain Natriuretic Peptide    Collection Time: 03/08/18  1:06 AM   Result Value Ref Range    Pro- (H) <300 pg/mL    BNP Interpretation         CBC with DIFF    Collection Time: 03/08/18  1:06 AM   Result Value Ref Range    WBC 8.9 3.5 - 11.0 k/uL    RBC 2.74 (L) 4.0 - 5.2 m/uL    Hemoglobin 8.6 (L) 12.0 - 16.0 g/dL    Hematocrit 27.3 (L) 36 - 46 %    MCV 99.8 80 - 100 fL    MCH 31.5 26 - 34 pg    MCHC 31.6 31 - 37 g/dL    RDW 16.1 (H) 11.5 - 14.5 %    Platelets 314 (L) 738 - 400 k/uL    MPV 8.8 6.0 - 12.0 fL    NRBC Automated NOT REPORTED per 100 WBC    Differential Type NOT REPORTED     Seg Neutrophils 76 (H) 36 - 66 %    Lymphocytes 13 (L) 24 - 44 %    Monocytes 9 (H) 1 - 7 %    Eosinophils % 2 1 - 4 %    Basophils 0 0 - 2 %    Immature Granulocytes NOT REPORTED 0 %    Segs Absolute 6.70 1.8 - 7.7 k/uL    Absolute Lymph # 1.20 1.0 - 4.8 k/uL    Absolute Mono # 0.80 0.2 - 0.8 k/uL    Absolute Eos # 0.20 0.0 - 0.4 k/uL    Basophils # 0.00 0.0 - 0.2 k/uL    Absolute Immature Granulocyte NOT REPORTED 0.00 - 0.30 k/uL    WBC Morphology NOT REPORTED     RBC Morphology NOT REPORTED     Platelet Estimate NOT REPORTED    Lipase    Collection Time: 03/08/18  1:06 AM   Result Value Ref Range    Lipase 56 13 - 60 U/L   Liver Profile    Collection Time: 03/08/18  1:06 AM   Result Value Ref Range    Alb 3.8 3.5 - 5.2 g/dL    Alkaline Phosphatase 35 35 - 104 U/L    ALT 6 5 - 33 U/L    AST 22 <32 U/L    Total Bilirubin 0.16 (L) 0.3 - 1.2 mg/dL    Bilirubin, Direct <0.08 <0.31 mg/dL    Bilirubin, Indirect CANNOT BE CALCULATED 0.00 - 1.00 mg/dL    Total Protein 6.1 (L) 6.4 - 8.3 g/dL    Globulin NOT REPORTED 1.5 - 3.8 g/dL    Albumin/Globulin Ratio NOT REPORTED 1.0 - 2.5   TYPE AND SCREEN    Collection Time: 03/08/18  1:10 AM   Result Value Ref Range    Expiration Date 03/11/2018     Arm Band Number [id number]     ABO/Rh O POSITIVE     Antibody Screen NEGATIVE    Basic Metabolic Panel w/ Reflex to MG    Collection Time: 03/08/18  5:46 AM   Result Value Ref Range    Glucose 93 70 - 99 mg/dL    BUN 36 (H) 8 - 23 mg/dL    CREATININE 2.49 (H) 0.50 - 0.90 mg/dL    Bun/Cre Ratio 14 9 - 20    Calcium 7.3 (L) 8.6 - 10.4 mg/dL    Sodium 141 135 - 144 mmol/L    Potassium 3.7 3.7 - 5.3 mmol/L    Chloride 94 (L) 98 - 107 mmol/L    CO2 35 (H) 20 - 31 mmol/L    Anion Gap 12 9 - 17 mmol/L    GFR Non-African American 19 (L) >60 mL/min    GFR  23 (L) >60 mL/min    GFR Comment          GFR Staging NOT REPORTED    POC Glucose Fingerstick    Collection Time: 03/08/18 12:14 PM   Result Value Ref Range    POC Glucose 108 (H) 65 - 105 mg/dL   POC Glucose Fingerstick    Collection Time: 03/08/18  4:06 PM   Result Value Ref Range    POC Glucose 123 (H) 65 - 105 mg/dL   UA W/REFLEX CULTURE    Collection Time: 03/08/18  6:45 PM   Result Value Ref Range    Color, UA YELLOW YEL    Turbidity UA SLIGHTLY CLOUDY (A) CLEAR    Glucose, Ur NEGATIVE NEG    Bilirubin Urine NEGATIVE NEG    Ketones, Urine NEGATIVE NEG    Specific Gravity, UA 1.015 1.005 - 1.030    Urine Hgb 2+ (A) NEG    pH, UA 6.5 5.0 - 8.0    Protein, UA NEGATIVE NEG    Urobilinogen, Urine Normal NORM    Nitrite, Urine NEGATIVE NEG    Leukocyte Esterase, Urine TRACE (A) NEG    Urinalysis Comments NOT REPORTED    Microscopic Urinalysis    Collection Time: 03/08/18  6:45 PM   Result Value Ref Range    -          WBC,     High attenuation material layering in the dependent portion the gallbladder,   likely thick bile/sludge.  Small gallstones not excluded.       Abnormal heterogeneous diminished attenuation of lower thoracic and lumbar   vertebral bodies.  A myeloproliferative disorder should be considered. Assessment :      Primary Problem  Urethral caruncle    Active Hospital Problems    Diagnosis Date Noted    Acute kidney injury superimposed on chronic kidney disease ([facility] [N17.9, N18.9] 03/08/2018    CKD stage 4 due to type 2 diabetes mellitus ([facility] [O41.94, N18.4] 03/08/2018    Urinary tract infection with hematuria [N39.0, R31.9] 03/08/2018    Urethral caruncle [N36.2] 03/08/2018    Nephrolithiasis [N20.0] 03/08/2018    Candidal intertrigo [B37.2] 03/08/2018    Parkinson disease ([facility] Jodell Eyal 02/22/2014    Gastroesophageal reflux disease without esophagitis [K21.9]     Sleep apnea [G47.30]     Controlled type 2 diabetes mellitus with stage 3 chronic kidney disease, without long-term current use of insulin (HCC) [E11.22, N18.3]     Hyperlipidemia [E78.5]     Hypertension [I10]        Plan:     Patient status Admit as inpatient in the  Med/Surge      Urethral caruncle. We will start topical estrogen and will wait to see what the gynecologist will say. Controlled type 2 diabetes mellitus with stage 3 chronic kidney disease, without long-term current use of insulin. Stable. 2.  Continue current management    Hyperlipidemia. Stable. 2.  Continue current management    Hypertension, stable, well controlled. Continue current management    Gastroesophageal reflux disease without esophagitis, asymptomatic at this point in time will continue home medications    Sleep apnea. We will start CPAP    Parkinson disease . We will controlled. Continue home dose of    Acute kidney injury superimposed on chronic kidney disease ([facility]    CKD stage 4 due to type 2 diabetes mellitus. We'll refer to nephrology.

## 2018-03-09 NOTE — PROGRESS NOTES
Balance  Sit to stand: Contact guard assistance  Stand to sit: Contact guard assistance  Functional Mobility  Functional - Mobility Device: Rolling Walker  Assist Level: Contact guard assistance  Functional Mobility Comments: pt needs assist to manage O2 tubing and IV pole, cues for safety throughout and to reach back for chair/toilet to not  \"plop\" back. Pt is impulsive and has dec insight/judgement with fall risk  Toilet Transfers  Equipment Used: Standard toilet  Toilet Transfer: Contact guard assistance  ADL  Feeding: Setup  Grooming: Contact guard assistance (CGA to stand at sink to perform oral care; cues for safety with walker during turns and when backing up from sink to toilet)  UE Bathing: Minimal assistance  LE Bathing: Moderate assistance  UE Dressing: Minimal assistance  LE Dressing: Moderate assistance  Toileting: Minimal assistance  Additional Comments: pt requring cues for safety throughout ADLs and cues for waiting for help with getting up from toilet. Pt is impulsive and fatigues easily with ADLs AEB SOB which pt requiring cues for pursed lip breathing. Tone RUE  RUE Tone: Normotonic  Tone LUE  LUE Tone: Normotonic  Coordination  Movements Are Fluid And Coordinated: Yes     Bed mobility  Comment: NT- pt is in chair  Transfers  Sit to stand: Contact guard assistance  Stand to sit: Contact guard assistance     Cognition  Overall Cognitive Status: Exceptions  Following Commands:  Follows one step commands with repetition  Safety Judgement: Decreased awareness of need for assistance;Decreased awareness of need for safety  Problem Solving: Assistance required to correct errors made;Assistance required to generate solutions;Assistance required to implement solutions;Decreased awareness of errors  Insights: Decreased awareness of deficits  Initiation: Requires cues for some  Sequencing: Requires cues for some  Perception  Overall Perceptual Status: WFL     Sensation  Overall Sensation Status: [facility]

## 2018-03-09 NOTE — CONSULTS
SEE A CARDIOLOGIST    Morbid obesity with BMI of 40.0-44.9, adult ([facility] 12/30/2016    ECTOR on CPAP     Osteoarthritis     Parkinson disease [facility]     Spinal stenosis in cervical region 6/2/2013    Type II or unspecified type diabetes mellitus without mention of complication, not stated as uncontrolled     Unspecified sleep apnea     cpap nightly    Urethral caruncle 3/8/2018    Wears glasses        Past Surgical History:   Procedure Laterality Date    ABDOMEN SURGERY      APPENDECTOMY      BACK SURGERY      CATARACT REMOVAL WITH IMPLANT Bilateral 2007   [address] SURGERY  2012    CERVICAL SPINE SURGERY  5/31/13    posterior c5-t1    COLONOSCOPY  2009    COLONOSCOPY  12/29/2016    incomplete was not cleaned out    COLONOSCOPY  12/30/2016    COLONOSCOPY  04/25/2017     SIGMOID COLON POLYPECTOMY:  HYPERPLASTIC POLYP ,   DIVERTICULOSIS    EYE SURGERY Bilateral     CATARACTS W/ IOL    HERNIA REPAIR  1999    VENTRAL    LAMINECTOMY  5/31/13    Dr. Que Waterman FLX W/REMOVAL LESION BY HOT BX FORCEPS N/A 4/25/2017    COLONOSCOPY POLYPECTOMY HOT BIOPSY performed by Malik Christensen MD at [facility] ENDOSCOPY  12/28/2016    gastritis, esophagitis,        Prior to Admission medications    Medication Sig Start Date End Date Taking? Authorizing Provider   aspirin 81 MG tablet Take 81 mg by mouth daily   Yes Historical Provider, MD   metoprolol tartrate (LOPRESSOR) 25 MG tablet Take 12.5 mg by mouth 2 times daily   Yes Historical Provider, MD   JANUMET  MG per tablet TAKE 1 TABLET TWICE A DAY WITH MEALS 2/28/18  Yes Lesly Hill,    HYDROcodone-acetaminophen (NORCO) 5-325 MG per tablet Take 1 tablet by mouth 2 times daily as needed for Pain for up to 30 days.  2/26/18 3/28/18 Yes Lesly Hill, DO   carbidopa-levodopa (SINEMET)  MG per tablet Take 1 tablet by mouth 4 times daily 9/15/16  Yes Historical Provider, MD   rOPINIRole (REQUIP) 2 MG tablet Take 2 mg by mouth 3 times daily   Yes Historical Provider, MD   furosemide (LASIX) 20 MG tablet Take 1 tablet by mouth 2 times daily 1/22/18  Yes Lesly Hill DO   pantoprazole (PROTONIX) 40 MG tablet Take 1 tablet by mouth 2 times daily (before meals) 1/22/18  Yes Lesly Hill DO   BREO ELLIPTA 100-25 MCG/INH AEPB inhaler Inhale 1 puff into the lungs daily 1/22/18  Yes Lesly Hill DO   Glucose Blood (BLOOD GLUCOSE TEST STRIPS) STRP 1 each by Other route 3 times daily 1/17/18  Yes Lesly Hill DO   acetaminophen (TYLENOL) 325 MG tablet Take 2 tablets by mouth every 4 hours as needed for Pain or Fever 12/21/17  Yes Jose Perez, DO   ondansetron (ZOFRAN) 4 MG/2ML injection Infuse 2 mLs intravenously every 6 hours as needed for Nausea 12/21/17  Yes Jose Perez, DO   ferrous sulfate 325 (65 Fe) MG EC tablet Take 1 tablet by mouth 2 times daily (with meals) 12/21/17  Yes Jose Perez DO   vitamin C (VITAMIN C) 500 MG tablet Take 1 tablet by mouth 2 times daily 12/21/17  Yes Jose Perez, DO   vitamin D (CHOLECALCIFEROL) 5000 units CAPS capsule Take 5,000 Units by mouth daily   Yes Historical Provider, MD   rasagiline mesylate 1 MG TABS Take 1 tablet by mouth daily   Yes Historical Provider, MD   calcitRIOL (ROCALTROL) 0.25 MCG capsule TAKE 1 CAPSULE THREE TIMES A DAY 11/9/17  Yes Lesly Hill DO   atorvastatin (LIPITOR) 20 MG tablet TAKE 1 TABLET DAILY 10/23/17  Yes Lesly Hill DO   amiodarone (CORDARONE) 200 MG tablet Take 1 tablet by mouth daily 8/27/17  Yes Jose Perez DO   miconazole (MICOTIN) 2 % powder Apply topically 2 times daily. Patient taking differently: Apply topically 2 times daily.  8/20/17  Yes Willard Marcos, DO   vitamin B-12 1000 MCG tablet Take 1 tablet by mouth daily 8/18/17  Yes Jose Perez DO   albuterol (PROVENTIL) (2.5 MG/3ML) 0.083% nebulizer solution Take 3 mLs by nebulization every 6 hours as needed for Wheezing 7/18/17  Yes Lesly L Component Value Date    WBC 7.1 03/09/2018    HGB 8.1 (L) 03/09/2018    HCT 24.9 (L) 03/09/2018    MCV 99.5 03/09/2018     (L) 03/09/2018     BMP:    Lab Results   Component Value Date     03/09/2018     03/08/2018     03/08/2018    K 3.5 (L) 03/09/2018    K 3.7 03/08/2018    K 4.0 03/08/2018    CL 95 (L) 03/09/2018    CL 94 (L) 03/08/2018    CL 92 (L) 03/08/2018    CO2 34 (H) 03/09/2018    CO2 35 (H) 03/08/2018    CO2 32 (H) 03/08/2018    BUN 28 (H) 03/09/2018    BUN 36 (H) 03/08/2018    BUN 38 (H) 03/08/2018    CREATININE 1.85 (H) 03/09/2018    CREATININE 2.49 (H) 03/08/2018    CREATININE 2.70 (H) 03/08/2018    GLUCOSE 144 (H) 03/09/2018    GLUCOSE 93 03/08/2018    GLUCOSE 89 03/08/2018     CMP:   Lab Results   Component Value Date     03/09/2018    K 3.5 03/09/2018    CL 95 03/09/2018    CO2 34 03/09/2018    BUN 28 03/09/2018    CREATININE 1.85 03/09/2018    GLUCOSE 144 03/09/2018    GLUCOSE 132 03/07/2012    CALCIUM 6.7 03/09/2018    PROT 6.1 03/08/2018    LABALBU 3.8 03/08/2018    BILITOT 0.16 03/08/2018    ALKPHOS 35 03/08/2018    AST 22 03/08/2018    ALT 6 03/08/2018      Hepatic:   Lab Results   Component Value Date    AST 22 03/08/2018    AST 18 12/17/2017    AST 18 12/16/2017    ALT 6 03/08/2018    ALT <5 (L) 12/17/2017    ALT 8 12/16/2017    BILITOT 0.16 (L) 03/08/2018    BILITOT 0.25 (L) 12/17/2017    BILITOT 0.21 (L) 12/16/2017    ALKPHOS 35 03/08/2018    ALKPHOS 49 12/17/2017    ALKPHOS 48 12/16/2017     BNP:   Lab Results   Component Value Date    BNP 40 06/07/2013     (H) 05/31/2013     Lipids:   Lab Results   Component Value Date    CHOL 135 08/26/2017    HDL 30 (L) 08/26/2017     INR:   Lab Results   Component Value Date    INR 1.0 03/08/2018    INR 1.1 12/16/2017    INR 1.0 09/03/2017     PTH: No results found for: PTH  Phosphorus:    Lab Results   Component Value Date    PHOS 6.3 12/17/2017     Ionized Calcium: No results found for: IONCA  Magnesium:   Lab

## 2018-03-10 VITALS
SYSTOLIC BLOOD PRESSURE: 134 MMHG | TEMPERATURE: 97.2 F | RESPIRATION RATE: 16 BRPM | BODY MASS INDEX: 33.99 KG/M2 | WEIGHT: 180 LBS | OXYGEN SATURATION: 100 % | HEART RATE: 64 BPM | DIASTOLIC BLOOD PRESSURE: 55 MMHG | HEIGHT: 61 IN

## 2018-03-10 PROBLEM — I87.2 VENOUS INSUFFICIENCY: Status: ACTIVE | Noted: 2018-03-10

## 2018-03-10 PROBLEM — D63.1 ANEMIA IN CHRONIC KIDNEY DISEASE: Status: ACTIVE | Noted: 2017-12-16

## 2018-03-10 PROBLEM — N18.9 ANEMIA IN CHRONIC KIDNEY DISEASE: Status: ACTIVE | Noted: 2017-12-16

## 2018-03-10 LAB
ABSOLUTE EOS #: 0.2 K/UL (ref 0–0.4)
ABSOLUTE IMMATURE GRANULOCYTE: ABNORMAL K/UL (ref 0–0.3)
ABSOLUTE LYMPH #: 1.6 K/UL (ref 1–4.8)
ABSOLUTE MONO #: 0.6 K/UL (ref 0.2–0.8)
ANION GAP SERPL CALCULATED.3IONS-SCNC: 13 MMOL/L (ref 9–17)
BASOPHILS # BLD: 1 % (ref 0–2)
BASOPHILS ABSOLUTE: 0 K/UL (ref 0–0.2)
BUN BLDV-MCNC: 23 MG/DL (ref 8–23)
BUN/CREAT BLD: 17 (ref 9–20)
CALCIUM SERPL-MCNC: 5.9 MG/DL (ref 8.6–10.4)
CHLORIDE BLD-SCNC: 100 MMOL/L (ref 98–107)
CO2: 32 MMOL/L (ref 20–31)
CREAT SERPL-MCNC: 1.34 MG/DL (ref 0.5–0.9)
DIFFERENTIAL TYPE: ABNORMAL
EOSINOPHILS RELATIVE PERCENT: 3 % (ref 1–4)
GFR AFRICAN AMERICAN: 47 ML/MIN
GFR NON-AFRICAN AMERICAN: 39 ML/MIN
GFR SERPL CREATININE-BSD FRML MDRD: ABNORMAL ML/MIN/{1.73_M2}
GFR SERPL CREATININE-BSD FRML MDRD: ABNORMAL ML/MIN/{1.73_M2}
GLUCOSE BLD-MCNC: 200 MG/DL (ref 65–105)
GLUCOSE BLD-MCNC: 92 MG/DL (ref 65–105)
GLUCOSE BLD-MCNC: 93 MG/DL (ref 65–105)
GLUCOSE BLD-MCNC: 93 MG/DL (ref 70–99)
HCT VFR BLD CALC: 24.9 % (ref 36–46)
HEMOGLOBIN: 8 G/DL (ref 12–16)
IMMATURE GRANULOCYTES: ABNORMAL %
LYMPHOCYTES # BLD: 21 % (ref 24–44)
MCH RBC QN AUTO: 32.3 PG (ref 26–34)
MCHC RBC AUTO-ENTMCNC: 32.2 G/DL (ref 31–37)
MCV RBC AUTO: 100.4 FL (ref 80–100)
MONOCYTES # BLD: 8 % (ref 1–7)
NRBC AUTOMATED: ABNORMAL PER 100 WBC
PDW BLD-RTO: 16.1 % (ref 11.5–14.5)
PLATELET # BLD: 114 K/UL (ref 130–400)
PLATELET ESTIMATE: ABNORMAL
PMV BLD AUTO: 8.7 FL (ref 6–12)
POTASSIUM SERPL-SCNC: 3.9 MMOL/L (ref 3.7–5.3)
RBC # BLD: 2.48 M/UL (ref 4–5.2)
RBC # BLD: ABNORMAL 10*6/UL
SEG NEUTROPHILS: 67 % (ref 36–66)
SEGMENTED NEUTROPHILS ABSOLUTE COUNT: 5.2 K/UL (ref 1.8–7.7)
SODIUM BLD-SCNC: 145 MMOL/L (ref 135–144)
WBC # BLD: 7.6 K/UL (ref 3.5–11)
WBC # BLD: ABNORMAL 10*3/UL

## 2018-03-10 PROCEDURE — 2580000003 HC RX 258: Performed by: INTERNAL MEDICINE

## 2018-03-10 PROCEDURE — 6370000000 HC RX 637 (ALT 250 FOR IP): Performed by: UROLOGY

## 2018-03-10 PROCEDURE — 99239 HOSP IP/OBS DSCHRG MGMT >30: CPT | Performed by: FAMILY MEDICINE

## 2018-03-10 PROCEDURE — 36415 COLL VENOUS BLD VENIPUNCTURE: CPT

## 2018-03-10 PROCEDURE — 6370000000 HC RX 637 (ALT 250 FOR IP): Performed by: INTERNAL MEDICINE

## 2018-03-10 PROCEDURE — 82947 ASSAY GLUCOSE BLOOD QUANT: CPT

## 2018-03-10 PROCEDURE — 94640 AIRWAY INHALATION TREATMENT: CPT

## 2018-03-10 PROCEDURE — 6360000002 HC RX W HCPCS: Performed by: INTERNAL MEDICINE

## 2018-03-10 PROCEDURE — 2700000000 HC OXYGEN THERAPY PER DAY

## 2018-03-10 PROCEDURE — 2500000003 HC RX 250 WO HCPCS: Performed by: NURSE PRACTITIONER

## 2018-03-10 PROCEDURE — 6370000000 HC RX 637 (ALT 250 FOR IP): Performed by: NURSE PRACTITIONER

## 2018-03-10 PROCEDURE — 80048 BASIC METABOLIC PNL TOTAL CA: CPT

## 2018-03-10 PROCEDURE — 97116 GAIT TRAINING THERAPY: CPT

## 2018-03-10 PROCEDURE — 85025 COMPLETE CBC W/AUTO DIFF WBC: CPT

## 2018-03-10 PROCEDURE — 6370000000 HC RX 637 (ALT 250 FOR IP): Performed by: FAMILY MEDICINE

## 2018-03-10 PROCEDURE — 97110 THERAPEUTIC EXERCISES: CPT

## 2018-03-10 PROCEDURE — 2580000003 HC RX 258: Performed by: FAMILY MEDICINE

## 2018-03-10 RX ORDER — DIPHENHYDRAMINE HCL 25 MG
25 TABLET ORAL EVERY 8 HOURS PRN
Status: DISCONTINUED | OUTPATIENT
Start: 2018-03-10 | End: 2018-03-10 | Stop reason: HOSPADM

## 2018-03-10 RX ORDER — POLYETHYLENE GLYCOL 3350 17 G/17G
17 POWDER, FOR SOLUTION ORAL DAILY
Qty: 527 G | Refills: 1 | Status: SHIPPED | OUTPATIENT
Start: 2018-03-11 | End: 2018-03-10

## 2018-03-10 RX ORDER — FLUCONAZOLE 100 MG/1
100 TABLET ORAL DAILY
Qty: 7 TABLET | Refills: 0 | Status: SHIPPED | OUTPATIENT
Start: 2018-03-11 | End: 2018-03-10

## 2018-03-10 RX ORDER — FLUCONAZOLE 100 MG/1
100 TABLET ORAL DAILY
Qty: 7 TABLET | Refills: 0 | Status: SHIPPED | OUTPATIENT
Start: 2018-03-11 | End: 2018-03-18

## 2018-03-10 RX ORDER — SENNA PLUS 8.6 MG/1
2 TABLET ORAL NIGHTLY
Qty: 60 TABLET | Refills: 0 | Status: SHIPPED | OUTPATIENT
Start: 2018-03-10 | End: 2018-04-09

## 2018-03-10 RX ORDER — POLYETHYLENE GLYCOL 3350 17 G/17G
17 POWDER, FOR SOLUTION ORAL DAILY
Status: DISCONTINUED | OUTPATIENT
Start: 2018-03-10 | End: 2018-03-10 | Stop reason: HOSPADM

## 2018-03-10 RX ORDER — POLYETHYLENE GLYCOL 3350 17 G/17G
17 POWDER, FOR SOLUTION ORAL DAILY
Qty: 527 G | Refills: 1 | Status: SHIPPED | OUTPATIENT
Start: 2018-03-11 | End: 2018-04-10

## 2018-03-10 RX ORDER — SENNA PLUS 8.6 MG/1
2 TABLET ORAL NIGHTLY
Qty: 60 TABLET | Refills: 0 | Status: SHIPPED | OUTPATIENT
Start: 2018-03-10 | End: 2018-03-10

## 2018-03-10 RX ADMIN — ALBUTEROL SULFATE 2.5 MG: 2.5 SOLUTION RESPIRATORY (INHALATION) at 13:55

## 2018-03-10 RX ADMIN — HYDROCODONE BITARTRATE AND ACETAMINOPHEN 1 TABLET: 5; 325 TABLET ORAL at 03:53

## 2018-03-10 RX ADMIN — MICONAZORB AF ANTIFUNGAL POWDER: 1.42 POWDER TOPICAL at 09:56

## 2018-03-10 RX ADMIN — Medication 500 MG: at 09:51

## 2018-03-10 RX ADMIN — CARBIDOPA AND LEVODOPA 1 TABLET: 25; 100 TABLET ORAL at 09:54

## 2018-03-10 RX ADMIN — CARBIDOPA AND LEVODOPA 1 TABLET: 25; 100 TABLET ORAL at 18:54

## 2018-03-10 RX ADMIN — SODIUM CHLORIDE 1000 MG: 9 INJECTION, SOLUTION INTRAVENOUS at 10:44

## 2018-03-10 RX ADMIN — AMIODARONE HYDROCHLORIDE 200 MG: 200 TABLET ORAL at 09:53

## 2018-03-10 RX ADMIN — CYANOCOBALAMIN TAB 1000 MCG 1000 MCG: 1000 TAB at 09:53

## 2018-03-10 RX ADMIN — METOPROLOL TARTRATE 12.5 MG: 25 TABLET ORAL at 09:51

## 2018-03-10 RX ADMIN — ATORVASTATIN CALCIUM 20 MG: 20 TABLET, FILM COATED ORAL at 09:54

## 2018-03-10 RX ADMIN — CARBIDOPA AND LEVODOPA 1 TABLET: 25; 100 TABLET ORAL at 16:46

## 2018-03-10 RX ADMIN — INSULIN LISPRO 4 UNITS: 100 INJECTION, SOLUTION INTRAVENOUS; SUBCUTANEOUS at 16:49

## 2018-03-10 RX ADMIN — PANTOPRAZOLE SODIUM 40 MG: 40 TABLET, DELAYED RELEASE ORAL at 06:11

## 2018-03-10 RX ADMIN — CALCIUM GLUCONATE 1 G: 98 INJECTION, SOLUTION INTRAVENOUS at 17:07

## 2018-03-10 RX ADMIN — ROPINIROLE HYDROCHLORIDE 2 MG: 2 TABLET, FILM COATED ORAL at 16:46

## 2018-03-10 RX ADMIN — ONDANSETRON 4 MG: 2 INJECTION INTRAMUSCULAR; INTRAVENOUS at 08:22

## 2018-03-10 RX ADMIN — CONJUGATED ESTROGENS 0.5 G: 0.62 CREAM VAGINAL at 09:56

## 2018-03-10 RX ADMIN — TAZOBACTAM SODIUM AND PIPERACILLIN SODIUM 3.38 G: 375; 3 INJECTION, SOLUTION INTRAVENOUS at 02:26

## 2018-03-10 RX ADMIN — HYDROCODONE BITARTRATE AND ACETAMINOPHEN 1 TABLET: 5; 325 TABLET ORAL at 16:39

## 2018-03-10 RX ADMIN — ROPINIROLE HYDROCHLORIDE 2 MG: 2 TABLET, FILM COATED ORAL at 09:54

## 2018-03-10 RX ADMIN — DIPHENHYDRAMINE HCL 25 MG: 25 TABLET ORAL at 01:30

## 2018-03-10 RX ADMIN — POLYETHYLENE GLYCOL 3350 17 G: 17 POWDER, FOR SOLUTION ORAL at 10:00

## 2018-03-10 RX ADMIN — FLUCONAZOLE 100 MG: 100 TABLET ORAL at 09:53

## 2018-03-10 RX ADMIN — RASAGILINE MESYLATE 1 MG: 0.5 TABLET ORAL at 09:52

## 2018-03-10 RX ADMIN — SODIUM CHLORIDE: 9 INJECTION, SOLUTION INTRAVENOUS at 02:26

## 2018-03-10 RX ADMIN — CALCITRIOL 0.25 MCG: 0.25 CAPSULE, LIQUID FILLED ORAL at 09:54

## 2018-03-10 RX ADMIN — ALBUTEROL SULFATE 2.5 MG: 2.5 SOLUTION RESPIRATORY (INHALATION) at 19:19

## 2018-03-10 RX ADMIN — PANTOPRAZOLE SODIUM 40 MG: 40 TABLET, DELAYED RELEASE ORAL at 16:47

## 2018-03-10 ASSESSMENT — PAIN DESCRIPTION - LOCATION: LOCATION: LEG

## 2018-03-10 ASSESSMENT — PAIN DESCRIPTION - PAIN TYPE
TYPE: CHRONIC PAIN
TYPE: CHRONIC PAIN

## 2018-03-10 ASSESSMENT — PAIN DESCRIPTION - FREQUENCY: FREQUENCY: INTERMITTENT

## 2018-03-10 ASSESSMENT — PAIN SCALES - GENERAL
PAINLEVEL_OUTOF10: 5
PAINLEVEL_OUTOF10: 6
PAINLEVEL_OUTOF10: 10
PAINLEVEL_OUTOF10: 0

## 2018-03-10 ASSESSMENT — PAIN DESCRIPTION - ORIENTATION: ORIENTATION: LEFT

## 2018-03-10 ASSESSMENT — PAIN DESCRIPTION - DESCRIPTORS: DESCRIPTORS: DISCOMFORT

## 2018-03-10 NOTE — PROGRESS NOTES
Physical Therapy  Facility/Department: [facility]  Daily Treatment Note  NAME: Emma Mcdonough  : 1945  MRN: [id number]    Date of Service: 3/10/2018    Patient Diagnosis(es):   Patient Active Problem List    Diagnosis Date Noted    Acute kidney injury superimposed on chronic kidney disease ([address] 2018    CKD stage 4 due to type 2 diabetes mellitus ([address] 2018    Urinary tract infection without hematuria 2018    Urethral caruncle 2018    Nephrolithiasis 2018    Candidal intertrigo 2018    Iron deficiency anemia due to chronic blood loss 2017    Acute on chronic respiratory failure with hypoxia and hypercapnia ([address] 2017    KRISTIN (acute kidney injury) ([address] 2017    Stage 3 chronic kidney disease 2017    Atrial fibrillation with RVR ([address]     Panlobular emphysema ([address] 2017    Rapid atrial fibrillation ([address] 2017    Hyperplastic polyp of intestine     Diverticulosis     Acute on chronic diastolic congestive heart failure ([address]     HCAP (healthcare-associated pneumonia) 01/15/2017    Acute respiratory failure with hypercapnia ([address]     COPD with exacerbation ([address]     Morbid obesity with BMI of 40.0-44.9, adult ([address] 2016    Acute renal failure ([address] 2016    Acute cystitis without hematuria 2016    Mental status change 2016    Gastrointestinal hemorrhage     Syncope and collapse     Parkinson disease ([address] 2014    Spinal stenosis in cervical region 2013    Hypomagnesemia 2013    Hyperphosphaturia 2013    Postoperative anemia due to acute blood loss 2013    Hypocalcemia 2013    Aspiration pneumonitis (HCC) 2013    Asthma     Gastroesophageal reflux disease without esophagitis     Sleep apnea     ECTOR on CPAP     Type 2 diabetes mellitus without complication, without long-term current use of insulin (HCC)     Chronic atrial fibrillation ([address] 05/31/2013    Essential hypertension 06/07/2012    Type 2 diabetes mellitus without complication ([facility] 49/24/3009    Chronic leg pain 06/07/2012    Controlled type 2 diabetes mellitus with stage 3 chronic kidney disease, without long-term current use of insulin ([facility]     Hyperlipidemia     Hypertension        Past Medical History:   Diagnosis Date    Acute on chronic diastolic congestive heart failure (HCC)     Asthma     Atrial fibrillation (HCC)     Cataracts, bilateral     Cerebral artery occlusion with cerebral infarction [facility])     Last stroke was February 2017 w/no deficits    Chronic kidney disease     Constipation     COPD (chronic obstructive pulmonary disease) ([facility]     PT. SEES DR. BECK    Diverticulosis     DM2 (diabetes mellitus, type 2) ([facility]     Full dentures     GERD (gastroesophageal reflux disease)     Headache(784.0)     Hyperlipidemia     Hyperplastic polyp of intestine     Hypertension     PT.  DOES NOT SEE A CARDIOLOGIST    Morbid obesity with BMI of 40.0-44.9, adult ([facility] 12/30/2016    ECTOR on CPAP     Osteoarthritis     Parkinson disease ([facility]     Spinal stenosis in cervical region 6/2/2013    Type II or unspecified type diabetes mellitus without mention of complication, not stated as uncontrolled     Unspecified sleep apnea     cpap nightly    Urethral caruncle 3/8/2018    Wears glasses      Past Surgical History:   Procedure Laterality Date    ABDOMEN SURGERY      APPENDECTOMY      BACK SURGERY      CATARACT REMOVAL WITH IMPLANT Bilateral 2007   [id number] SURGERY  2012    CERVICAL SPINE SURGERY  5/31/13    posterior c5-t1    COLONOSCOPY  2009    COLONOSCOPY  12/29/2016    incomplete was not cleaned out    COLONOSCOPY  12/30/2016    COLONOSCOPY  04/25/2017     SIGMOID COLON POLYPECTOMY:  HYPERPLASTIC POLYP ,   DIVERTICULOSIS    EYE SURGERY Bilateral     CATARACTS W/ IOL    HERNIA REPAIR  1999    VENTRAL    LAMINECTOMY  5/31/13    Dr. Arian Brand Discharge Recommendations:  24 hour supervision or assist, Home with Home health PT    Goals  Short term goals  Time Frame for Short term goals: 12 visits  Short term goal 1: Patient will be indep with bed mobility and transfers. Short term goal 2: Patient will amb 100 feet with RW and supervision. Short term goal 3: Patient will have good- standing balance with RW. Short term goal 4: Patient / family will be indep with HEP. Short term goal 5: Patient will tolerate 30 minutes of ther-ex anf ther-act.   Patient Goals   Patient goals : Return home    Plan    Plan  Times per week: 1-2x/d, 5-6 d/wk  Current Treatment Recommendations: Strengthening, Balance Training, Functional Mobility Training, Transfer Training, Endurance Training, Gait Training, Home Exercise Program, Safety Education & Training, Patient/Caregiver Education & Training  Safety Devices  Type of devices: Nurse notified, Left in chair, Gait belt, Chair alarm in place, All fall risk precautions in place, Call light within reach  Restraints  Initially in place: No     Therapy Time   Individual Concurrent Group Co-treatment   Time In  1132         Time Out  1204         Minutes  [address]

## 2018-03-10 NOTE — DISCHARGE SUMMARY
in the urine and nitrite positive and in view of her postmenopausal bleeding.  She was referred to gynecology and admitted for further management.  She denies any significant bleeding from the vagina and denies any hematochezia.  She has no recent weight loss.  No fever, chills, nausea, vomiting or any diarrhea.     In the ER, she was noted to have 3+ hemoglobin in the urine and positive nitrite and leukocyte esterase.  And also many bacteria.  This was a clean catch specimen. And showed E. coli, but however, and next specimen that was done using a catheter did not have any growth. Patient, however, got Rocephin and was stopped and got 1 dose of Invanz. Patient also was given Diflucan for her intertrigo. Patient did not have any hematuria. In reality it was vaginal bleeding secondary to the urethral caruncle. Patient was started on estrogen cream.  Patient had acute kidney injury and we hydrated her and her renal function significantly improved. She had some edema that was dependent and was started on support stockings. Patient had a lot of stool and we had to give her laxatives with good results. Patient was seen by gynecologist and advised conservative management. We will follow up as an outpatient for fibroids for Further evaluation. Significant therapeutic interventions:   1. Urethral caruncle. We continue topical estrogen and follow up with  2.   Controlled type 2 diabetes mellitus with stage 3 chronic kidney disease, without long-term current use of insulin.  Stable. Continue current management and refer to nephrology. 3.   Hyperlipidemia.  Stable. Continue current management  4.   Hypertension, stable, well controlled. Continue current management  5.   Gastroesophageal reflux disease without esophagitis, asymptomatic at this point in time will continue home medications  6.   Sleep apnea.  We will continue CPAP  7.   Parkinson disease .  Well controlled.  Continue home dose of medications  8.   Acute kidney injury superimposed on chronic kidney disease (HCC)The current medical regimen is effective;  continue present plan and medications. 9.   CKD stage 4 due to type 2 diabetes mellitus.  We'll refer to nephrology. 10.   Urinary tract infection with hematuria. Contaminated and will need no antibiotics. D/W  and agreed with management. 11. Nephrolithiasis. Patient has renal stones that are 5 mm, will refer the patient to urology as out patient. 12. Candidal intertrigo.  We will continue Diflucan 100 mg by mouth daily for 7days and miconazole powder twice a day.   13. Discussed with the patient and  and explained and spent greater than 45 minutes and answering all the questions over the phone and okay for discharge  Significant Diagnostic Studies:   Labs / Micro:  CBC:   Lab Results   Component Value Date    WBC 7.6 03/10/2018    RBC 2.48 03/10/2018    HGB 8.0 03/10/2018    HCT 24.9 03/10/2018    .4 03/10/2018    MCH 32.3 03/10/2018    MCHC 32.2 03/10/2018    RDW 16.1 03/10/2018     03/10/2018     BMP:    Lab Results   Component Value Date    GLUCOSE 93 03/10/2018    GLUCOSE 132 03/07/2012     03/10/2018    K 3.9 03/10/2018     03/10/2018    CO2 32 03/10/2018    ANIONGAP 13 03/10/2018    BUN 23 03/10/2018    CREATININE 1.34 03/10/2018    BUNCRER 17 03/10/2018    CALCIUM 5.9 03/10/2018    LABGLOM 39 03/10/2018    GFRAA 47 03/10/2018    GFR      03/10/2018    GFR NOT REPORTED 03/10/2018     HFP:    Lab Results   Component Value Date    PROT 6.1 03/08/2018     CMP:    Lab Results   Component Value Date    GLUCOSE 93 03/10/2018    GLUCOSE 132 03/07/2012     03/10/2018    K 3.9 03/10/2018     03/10/2018    CO2 32 03/10/2018    BUN 23 03/10/2018    CREATININE 1.34 03/10/2018    ANIONGAP 13 03/10/2018    ALKPHOS 35 03/08/2018    ALT 6 03/08/2018    AST 22 03/08/2018    BILITOT 0.16 03/08/2018    LABALBU 3.8 03/08/2018    ALBUMIN NOT REPORTED [address] [facility], DO  [address] [facility] [address]  [address]. [phone]    Call  for follow up       Requiring Further Evaluation/Follow Up POST HOSPITALIZATION/Incidental Findings: Follow-up on nephrolithiasis, urinary caruncle and also STAGE 3 CKD. Diet: renal diet    Activity: As tolerated    Instructions to Patient: Patient was advised about perineal care and would benefit from bidet toilet.     Discharge Medications:      Medication List      STOP taking these medications    ciprofloxacin 500 MG tablet  Commonly known as:  CIPRO     HUMALOG KWIKPEN 100 UNIT/ML pen  Generic drug:  insulin lispro     sulfamethoxazole-trimethoprim 800-160 MG per tablet  Commonly known as:  BACTRIM DS        ASK your doctor about these medications    acetaminophen 325 MG tablet  Commonly known as:  TYLENOL  Take 2 tablets by mouth every 4 hours as needed for Pain or Fever     amiodarone 200 MG tablet  Commonly known as:  CORDARONE  Take 1 tablet by mouth daily     ascorbic acid 500 MG tablet  Commonly known as:  VITAMIN C  Take 1 tablet by mouth 2 times daily     aspirin 81 MG tablet     atorvastatin 20 MG tablet  Commonly known as:  LIPITOR  TAKE 1 TABLET DAILY     BLOOD GLUCOSE TEST STRIPS Strp  1 each by Other route 3 times daily     BREO ELLIPTA 100-25 MCG/INH Aepb inhaler  Generic drug:  fluticasone-vilanterol  Inhale 1 puff into the lungs daily     calcitRIOL 0.25 MCG capsule  Commonly known as:  ROCALTROL  TAKE 1 CAPSULE THREE TIMES A DAY     calcium elemental 500 MG Tabs tablet  Commonly known as:  OSCAL     carbidopa-levodopa  MG per tablet  Commonly known as:  SINEMET     cyanocobalamin 1000 MCG tablet  Take 1 tablet by mouth daily     ferrous sulfate 325 (65 Fe) MG EC tablet  Take 1 tablet by mouth 2 times daily (with meals)     furosemide 20 MG tablet  Commonly known as:  LASIX  Take 1 tablet by mouth 2 times daily     HYDROcodone-acetaminophen 5-325 MG per tablet  Commonly known as:  NORCO  Take 1 tablet by mouth 2 times daily as needed for Pain for up to 30 days. JANUMET  MG per tablet  Generic drug:  sitaGLIPtan-metformin  TAKE 1 TABLET TWICE A DAY WITH MEALS     magnesium oxide 400 MG tablet  Commonly known as:  MAG-OX     metoprolol tartrate 25 MG tablet  Commonly known as:  LOPRESSOR     miconazole 2 % powder  Commonly known as:  MICOTIN  Apply topically 2 times daily. ondansetron 4 MG/2ML injection  Commonly known as:  ZOFRAN  Infuse 2 mLs intravenously every 6 hours as needed for Nausea     Oxygen Concentrator  Dx: Pneumonia, use as directed. pantoprazole 40 MG tablet  Commonly known as:  PROTONIX  Take 1 tablet by mouth 2 times daily (before meals)     * PROAIR  (90 Base) MCG/ACT inhaler  Generic drug:  albuterol sulfate HFA     * albuterol (2.5 MG/3ML) 0.083% nebulizer solution  Commonly known as:  PROVENTIL  Take 3 mLs by nebulization every 6 hours as needed for Wheezing     rasagiline mesylate 1 MG Tabs     rOPINIRole 2 MG tablet  Commonly known as:  REQUIP     vitamin D 5000 units Caps capsule  Commonly known as:  CHOLECALCIFEROL        * This list has 2 medication(s) that are the same as other medications prescribed for you. Read the directions carefully, and ask your doctor or other care provider to review them with you. Time Spent on discharge is  45 mins in patient examination, evaluation, counseling as well as medication reconciliation, prescriptions for required medications, discharge plan and follow up. Electronically signed by   Allen Bhatt MD  3/10/2018  2:53 PM      Thank you Dr. Low Trammell DO for the opportunity to be involved in this patient's care.

## 2018-03-10 NOTE — PROGRESS NOTES
disease ([facility]     Spinal stenosis in cervical region 6/2/2013    Type II or unspecified type diabetes mellitus without mention of complication, not stated as uncontrolled     Unspecified sleep apnea     cpap nightly    Urethral caruncle 3/8/2018    Wears glasses        Past Surgical History:   Procedure Laterality Date    ABDOMEN SURGERY      APPENDECTOMY      BACK SURGERY      CATARACT REMOVAL WITH IMPLANT Bilateral 2007   [address] SURGERY  2012    CERVICAL SPINE SURGERY  5/31/13    posterior c5-t1    COLONOSCOPY  2009    COLONOSCOPY  12/29/2016    incomplete was not cleaned out    COLONOSCOPY  12/30/2016    COLONOSCOPY  04/25/2017     SIGMOID COLON POLYPECTOMY:  HYPERPLASTIC POLYP ,   DIVERTICULOSIS    EYE SURGERY Bilateral     CATARACTS W/ IOL    HERNIA REPAIR  1999    VENTRAL    LAMINECTOMY  5/31/13    Dr. Wojciech Melendez FLX W/REMOVAL LESION BY HOT BX FORCEPS N/A 4/25/2017    COLONOSCOPY POLYPECTOMY HOT BIOPSY performed by Kayley Donohue MD at [facility] ENDOSCOPY  12/28/2016    gastritis, esophagitis,        Prior to Admission medications    Medication Sig Start Date End Date Taking? Authorizing Provider   aspirin 81 MG tablet Take 81 mg by mouth daily   Yes Historical Provider, MD   metoprolol tartrate (LOPRESSOR) 25 MG tablet Take 12.5 mg by mouth 2 times daily   Yes Historical Provider, MD   JANUMET  MG per tablet TAKE 1 TABLET TWICE A DAY WITH MEALS 2/28/18  Yes Lesly Hill,    HYDROcodone-acetaminophen (NORCO) 5-325 MG per tablet Take 1 tablet by mouth 2 times daily as needed for Pain for up to 30 days.  2/26/18 3/28/18 Yes Lesly Hill,    carbidopa-levodopa (SINEMET)  MG per tablet Take 1 tablet by mouth 4 times daily 9/15/16  Yes Historical Provider, MD   rOPINIRole (REQUIP) 2 MG tablet Take 2 mg by mouth 3 times daily   Yes Historical Provider, MD   furosemide (LASIX) 20 MG tablet Take 1 tablet by is a fibroid uterus.  No definite obstructed endometrial canal.       Bilateral nonobstructing renal calculi measuring up to 5 mm more numerous on   the right.       High attenuation material layering in the dependent portion the gallbladder,   likely thick bile/sludge.  Small gallstones not excluded.       Abnormal heterogeneous diminished attenuation of lower thoracic and lumbar   vertebral bodies.  A myeloproliferative disorder should be considered.         Assessment:  1. Acute kidney injury, multi factorial, in the setting of urinary tract infection and recent antibiotic treatment  2. CKD3  3. Nephrolithiasis   4. Urethral caruncle  5. Anemia  6. Hypokalemia    Plan:  Renal function has been improving  FeNa 0.67%. Avoid further use of Bactrim  DC IVF. Renal diet. Encourage po intake. Avoid nephrotoxic drugs and IV contrast exposure. Follow up chemistries ordered for AM.    Thank you for the consultation. Please do not hesitate to contact us for any further questions/concerns. We will continue to follow along with you. Pt seen in collaboration with Dr. Erik Rodrigues. Electronically signed by Guero Torre CNP, APRN  on 3/10/2018 at 9:35 AM     Seen and D/W CNP, agree with above note, Cr improving nicely.

## 2018-03-10 NOTE — PLAN OF CARE
Problem: Falls - Risk of  Goal: Absence of falls  Outcome: Ongoing      Problem: Serum Glucose Level - Abnormal:  Goal: Ability to maintain appropriate glucose levels will improve  Ability to maintain appropriate glucose levels will improve   Outcome: Ongoing

## 2018-03-10 NOTE — PROGRESS NOTES
up with  2. Controlled type 2 diabetes mellitus with stage 3 chronic kidney disease, without long-term current use of insulin. Stable. Continue current management and refer to nephrology. 3.   Hyperlipidemia. Stable. Continue current management  4. Hypertension, stable, well controlled. Continue current management  5. Gastroesophageal reflux disease without esophagitis, asymptomatic at this point in time will continue home medications  6. Sleep apnea. We will continue CPAP  7. Parkinson disease . Well controlled. Continue home dose of medications  8. Acute kidney injury superimposed on chronic kidney disease (HCC)The current medical regimen is effective;  continue present plan and medications. 9.   CKD stage 4 due to type 2 diabetes mellitus. We'll refer to nephrology. 10.   Urinary tract infection with hematuria. Contaminated and will need no antibiotics. D/W  and agreed with management. 11. Nephrolithiasis. Patient has renal stones that are 5 mm, will refer the patient to urology as out patient. 12. Candidal intertrigo. We will continue Diflucan 100 mg by mouth daily for 7days and miconazole powder twice a day.   13. Discussed with the patient and  and explained and spent greater than 45 minutes and answering all the questions over the phone and okay for discharge  Mary Ospina MD  3/10/2018  2:46 PM

## 2018-03-12 ENCOUNTER — TELEPHONE (OUTPATIENT)
Dept: FAMILY MEDICINE CLINIC | Age: 73
End: 2018-03-12

## 2018-03-12 NOTE — TELEPHONE ENCOUNTER
[facility] Initial Follow Up Call    Call within 2 business days of discharge: Yes    Patient: Ron Foley Patient : 1945   MRN: [id number]  Reason for Admission: There are no discharge diagnoses documented for the most recent discharge. Discharge Date: 3/10/18 RARS: Marcoer Risk Score: 25     Spoke with: no one    Facility: [unfilled]    Non-face-to-face services provided:  Obtained and reviewed discharge summary and/or continuity of care documents     Attempted to call patient to discuss transitional care and to make an appointment with Dr. Shanta Morales. Telephone was busy. Will attempt to call again tomorrow    Follow Up  No future appointments.     Godfrey Lyles RN

## 2018-03-14 ENCOUNTER — OFFICE VISIT (OUTPATIENT)
Dept: FAMILY MEDICINE CLINIC | Age: 73
End: 2018-03-14
Payer: COMMERCIAL

## 2018-03-14 ENCOUNTER — HOSPITAL ENCOUNTER (OUTPATIENT)
Age: 73
Setting detail: SPECIMEN
Discharge: HOME | End: 2018-03-14
Payer: MEDICARE

## 2018-03-14 VITALS
HEIGHT: 61 IN | WEIGHT: 179.9 LBS | RESPIRATION RATE: 16 BRPM | HEART RATE: 72 BPM | SYSTOLIC BLOOD PRESSURE: 132 MMHG | DIASTOLIC BLOOD PRESSURE: 60 MMHG | BODY MASS INDEX: 33.96 KG/M2

## 2018-03-14 DIAGNOSIS — Z09 HOSPITAL DISCHARGE FOLLOW-UP: Primary | ICD-10-CM

## 2018-03-14 DIAGNOSIS — N36.2 URETHRAL CARUNCLE: ICD-10-CM

## 2018-03-14 DIAGNOSIS — D50.0 IRON DEFICIENCY ANEMIA DUE TO CHRONIC BLOOD LOSS: ICD-10-CM

## 2018-03-14 DIAGNOSIS — R93.5 ABNORMAL CT OF THE ABDOMEN: ICD-10-CM

## 2018-03-14 DIAGNOSIS — R11.14 BILIOUS VOMITING WITH NAUSEA: ICD-10-CM

## 2018-03-14 PROCEDURE — 1111F DSCHRG MED/CURRENT MED MERGE: CPT | Performed by: FAMILY MEDICINE

## 2018-03-14 PROCEDURE — 99215 OFFICE O/P EST HI 40 MIN: CPT | Performed by: FAMILY MEDICINE

## 2018-03-15 NOTE — PROGRESS NOTES
performed by Deanne Espinoza MD at [facility] ENDOSCOPY  12/28/2016    gastritis, esophagitis,      Family History   Problem Relation Age of Onset    Heart Failure Mother     Hypertension Mother     Heart Disease Mother     High Blood Pressure Mother     Asthma Other        Current Outpatient Prescriptions   Medication Sig Dispense Refill    fluconazole (DIFLUCAN) 100 MG tablet Take 1 tablet by mouth daily for 7 days 7 tablet 0    conjugated estrogens (PREMARIN) 0.625 MG/GM vaginal cream Place vaginally daily. 1 Tube 3    senna (SENOKOT) 8.6 MG tablet Take 2 tablets by mouth nightly 60 tablet 0    polyethylene glycol (GLYCOLAX) packet Take 17 g by mouth daily 527 g 1    metoprolol tartrate (LOPRESSOR) 25 MG tablet Take 0.5 tablets by mouth 2 times daily 60 tablet 3    aspirin 81 MG tablet Take 81 mg by mouth daily      JANUMET  MG per tablet TAKE 1 TABLET TWICE A DAY WITH MEALS 180 tablet 1    HYDROcodone-acetaminophen (NORCO) 5-325 MG per tablet Take 1 tablet by mouth 2 times daily as needed for Pain for up to 30 days.  60 tablet 0    carbidopa-levodopa (SINEMET)  MG per tablet Take 1 tablet by mouth 4 times daily      rOPINIRole (REQUIP) 2 MG tablet Take 2 mg by mouth 3 times daily      furosemide (LASIX) 20 MG tablet Take 1 tablet by mouth 2 times daily 180 tablet 3    pantoprazole (PROTONIX) 40 MG tablet Take 1 tablet by mouth 2 times daily (before meals) 180 tablet 3    BREO ELLIPTA 100-25 MCG/INH AEPB inhaler Inhale 1 puff into the lungs daily 3 each 3    ferrous sulfate 325 (65 Fe) MG EC tablet Take 1 tablet by mouth 2 times daily (with meals) 90 tablet 3    vitamin D (CHOLECALCIFEROL) 5000 units CAPS capsule Take 5,000 Units by mouth daily      rasagiline mesylate 1 MG TABS Take 1 tablet by mouth daily      calcitRIOL (ROCALTROL) 0.25 MCG capsule TAKE 1 CAPSULE THREE TIMES A  capsule 2    atorvastatin (LIPITOR) 20 MG tablet TAKE 1 TABLET DAILY 90 tablet 2    amiodarone (CORDARONE) 200 MG tablet Take 1 tablet by mouth daily 30 tablet 3    miconazole (MICOTIN) 2 % powder Apply topically 2 times daily. (Patient taking differently: Apply topically 2 times daily.) 45 g 1    vitamin B-12 1000 MCG tablet Take 1 tablet by mouth daily 30 tablet 3    albuterol (PROVENTIL) (2.5 MG/3ML) 0.083% nebulizer solution Take 3 mLs by nebulization every 6 hours as needed for Wheezing 360 each 3    calcium elemental (OSCAL) 500 MG TABS tablet Take 500 mg by mouth 3 times daily       Oxygen Concentrator Dx: Pneumonia, use as directed. 1 each 0     No current facility-administered medications for this visit. ALLERGIES:    Allergies   Allergen Reactions    Dye [Barium-Containing Compounds] Other (See Comments)     Cause Afib per     Pcn [Penicillins] Itching and Swelling    Red Dye Itching and Rash     This allergy is likely incorrect:  Per patient's  she has no issue with medications that have red dye in them or red food. He thinks this reaction was added to chart when the pt had a colonoscopy (possibly barium or iodine dye instead)       Social History   Substance Use Topics    Smoking status: Former Smoker     Packs/day: 2.00     Years: 15.00     Types: Cigarettes     Quit date: 10/31/1980    Smokeless tobacco: Never Used    Alcohol use 1.2 oz/week     2 Shots of liquor per week      Comment: socially - one black [place] 1/week \"at dinner\"        LDL Cholesterol (mg/dL)   Date Value   08/26/2017 73     HDL (mg/dL)   Date Value   08/26/2017 30 (L)     BUN (mg/dL)   Date Value   03/10/2018 23     CREATININE (mg/dL)   Date Value   03/10/2018 1.34 (H)     Glucose (mg/dL)   Date Value   03/10/2018 93   03/07/2012 132 (H)     Hemoglobin A1C (%)   Date Value   12/22/2017 5.5     Microalb/Crt.  Ratio (mcg/mg creat)   Date Value   11/09/2017 174 (H)              Subjective:      HPI  She is here today for hospital Ear: External ear normal. Tympanic membrane is not erythematous. No middle ear effusion. Left Ear: External ear normal. Tympanic membrane is not erythematous. No middle ear effusion. Nose: No mucosal edema. Mouth/Throat: Oropharynx is clear and moist. No posterior oropharyngeal erythema. Eyes: Conjunctivae and EOM are normal. Pupils are equal, round, and reactive to light. Neck: Normal range of motion. Neck supple. No thyromegaly present. Cardiovascular: Normal rate, regular rhythm and normal heart sounds. No murmur heard. Pulmonary/Chest: Effort normal and breath sounds normal. She has no wheezes. Shehas no rales. Abdominal: Soft. Bowel sounds are normal. She exhibits no distension and no mass. There is no tenderness. There is no rebound and no guarding. Genitourinary/Anorectal: On exam she has a fungating 3 cm bleeding irregularly lesion extending from the urethra going into the vaginal vault  Cervix is without lesions or abnormality there is no blood coming from the cervical os vaginal vault looks normal and there is no adnexal mass or fullness uterus is slightly enlarged   Musculoskeletal: Normal range of motion. She exhibits no edema or tenderness. Lymphadenopathy: She has no cervical adenopathy. Neurological: She is alert and oriented to person, place, and time. She has normal reflexes. Skin: Skin is warm and dry. No rash noted. Psychiatric: She has a normal mood and affect. Her   behavior is normal.       Assessment:      1. Hospital discharge follow-up    2. Urethral caruncle    3. Bilious vomiting with nausea    4. Abnormal CT of the abdomen    5. Iron deficiency anemia due to chronic blood loss          Plan:      Call or return to clinic prn if these symptoms worsen or fail to improve as anticipated. I have reviewed the instructions with the patient, answering all questions to her satisfaction. No Follow-up on file.   Orders Placed This Encounter   Procedures    NM Reason:   Specialty Services Required     Referred to Provider:   Zelpha Opitz, MD     Requested Specialty:   Obstetrics & Gynecology     Number of Visits Requested:   1     No orders of the defined types were placed in this encounter.    I think that this lesion looks cancerous  I think she needs to see Dr. Cherrie Guevara immediately  She should desist using the Premarin cream   we set an appointment to see him on Monday  Also obtain a HIDA scan to see if she is having abnormality is with her gallbladder as to the source of her ongoing nausea and vomiting      Electronically signed by David Magaña DO on 3/15/2018 at 9:15 AM

## 2018-03-18 ENCOUNTER — PATIENT MESSAGE (OUTPATIENT)
Dept: FAMILY MEDICINE CLINIC | Age: 73
End: 2018-03-18

## 2018-03-19 LAB
HPV SAMPLE: NORMAL
HPV SOURCE: NORMAL
HPV, GENOTYPE 16: NOT DETECTED
HPV, GENOTYPE 18: NOT DETECTED
HPV, HIGH RISK OTHER: NOT DETECTED
HPV, INTERPRETATION: NORMAL

## 2018-03-19 NOTE — TELEPHONE ENCOUNTER
From: Lucita Esteban  To:  Jarret Matthew DO  Sent: 3/18/2018 6:24 PM EDT  Subject: Visit Follow-Up Question    hi this is jeremy borrero afollow up question what is the next step in getting my hemoglobin up and is that why I'm so lighter all the time tks

## 2018-03-20 ENCOUNTER — PATIENT MESSAGE (OUTPATIENT)
Dept: FAMILY MEDICINE CLINIC | Age: 73
End: 2018-03-20

## 2018-03-20 LAB — CYTOLOGY REPORT: NORMAL

## 2018-03-20 NOTE — TELEPHONE ENCOUNTER
From: Francisco Li  To: Som Romo DO  Sent: 3/20/2018 7:19 AM EDT  Subject: Visit Follow-Up Question    This is her  Flex Harvey. We saw Dr. Baron Jamil yesterday, he has scheduled a bladder test  as out patient with [facility] on April 4th. We will be there with Dr. Jolan Blizzard on Thursday. Were concerned about  the lesion and hope it can be addressed. Thanks.  ----- Message -----  From: Som Romo DO  Sent: 3/19/2018 12:58 PM EDT  To: Francisco Li  Subject: RE: Visit Follow-Up Question  Get the urological issue you have going on doubt with and continue on the iron.  And yes that's why her lightheaded    ----- Message -----   From: Francisco Li   Sent: 3/18/2018 6:24 PM EDT   To: Som Romo DO  Subject: Visit Follow-Up Question    hi this is jeremy gissell afollow up question what is the next step in getting my hemoglobin up and is that why I'm so lighter all the time tks

## 2018-03-21 ENCOUNTER — PATIENT MESSAGE (OUTPATIENT)
Dept: FAMILY MEDICINE CLINIC | Age: 73
End: 2018-03-21

## 2018-03-22 ENCOUNTER — OFFICE VISIT (OUTPATIENT)
Dept: OBGYN CLINIC | Age: 73
End: 2018-03-22
Payer: COMMERCIAL

## 2018-03-22 VITALS
WEIGHT: 177 LBS | HEART RATE: 68 BPM | DIASTOLIC BLOOD PRESSURE: 51 MMHG | SYSTOLIC BLOOD PRESSURE: 129 MMHG | HEIGHT: 61 IN | BODY MASS INDEX: 33.42 KG/M2

## 2018-03-22 DIAGNOSIS — D25.9 UTERINE LEIOMYOMA, UNSPECIFIED LOCATION: Primary | ICD-10-CM

## 2018-03-22 PROCEDURE — G8427 DOCREV CUR MEDS BY ELIG CLIN: HCPCS | Performed by: OBSTETRICS & GYNECOLOGY

## 2018-03-22 PROCEDURE — 1036F TOBACCO NON-USER: CPT | Performed by: OBSTETRICS & GYNECOLOGY

## 2018-03-22 PROCEDURE — G8482 FLU IMMUNIZE ORDER/ADMIN: HCPCS | Performed by: OBSTETRICS & GYNECOLOGY

## 2018-03-22 PROCEDURE — 3014F SCREEN MAMMO DOC REV: CPT | Performed by: OBSTETRICS & GYNECOLOGY

## 2018-03-22 PROCEDURE — G8417 CALC BMI ABV UP PARAM F/U: HCPCS | Performed by: OBSTETRICS & GYNECOLOGY

## 2018-03-22 PROCEDURE — 1123F ACP DISCUSS/DSCN MKR DOCD: CPT | Performed by: OBSTETRICS & GYNECOLOGY

## 2018-03-22 PROCEDURE — 1111F DSCHRG MED/CURRENT MED MERGE: CPT | Performed by: OBSTETRICS & GYNECOLOGY

## 2018-03-22 PROCEDURE — 4040F PNEUMOC VAC/ADMIN/RCVD: CPT | Performed by: OBSTETRICS & GYNECOLOGY

## 2018-03-22 PROCEDURE — 3017F COLORECTAL CA SCREEN DOC REV: CPT | Performed by: OBSTETRICS & GYNECOLOGY

## 2018-03-22 PROCEDURE — 1090F PRES/ABSN URINE INCON ASSESS: CPT | Performed by: OBSTETRICS & GYNECOLOGY

## 2018-03-22 PROCEDURE — 99201 PR OFFICE OUTPATIENT NEW 10 MINUTES: CPT | Performed by: OBSTETRICS & GYNECOLOGY

## 2018-03-22 PROCEDURE — G8400 PT W/DXA NO RESULTS DOC: HCPCS | Performed by: OBSTETRICS & GYNECOLOGY

## 2018-03-22 NOTE — PROGRESS NOTES
Reg Garcia  3/22/2018      Chief Complaint   Patient presents with    Follow-up    Established New Doctor       HPI: 69yo chronically ill female with bleeding from vulvar area more noticeable with urination. I saw patient in conjunction with Dr. Elina Robles last week and in the interim patient has been scheduled with Dr. Addie Franz for evaluation in [address] [place] [address]. Past Medical History:   Diagnosis Date    Acute on chronic diastolic congestive heart failure (HCC)     Asthma     Atrial fibrillation (HCC)     Cataracts, bilateral     Cerebral artery occlusion with cerebral infarction [facility])     Last stroke was February 2017 w/no deficits    Chronic kidney disease     Constipation     COPD (chronic obstructive pulmonary disease) ([facility]     PT. SEES DR. BECK    Diverticulosis     DM2 (diabetes mellitus, type 2) ([facility] [facility]     Full dentures     GERD (gastroesophageal reflux disease)     Headache(784.0)     Hyperlipidemia     Hyperplastic polyp of intestine     Hypertension     PT.  DOES NOT SEE A CARDIOLOGIST    Morbid obesity with BMI of 40.0-44.9, adult ([facility] [address] [facility] 12/30/2016    ECTOR on CPAP     Osteoarthritis     Parkinson disease ([facility] [facility]     Spinal stenosis in cervical region 6/2/2013    Type II or unspecified type diabetes mellitus without mention of complication, not stated as uncontrolled     Unspecified sleep apnea     cpap nightly    Urethral caruncle 3/8/2018    Wears glasses      Past Surgical History:   Procedure Laterality Date    ABDOMEN SURGERY      APPENDECTOMY      BACK SURGERY      CATARACT REMOVAL WITH IMPLANT Bilateral 2007   [id number] SURGERY  2012    CERVICAL SPINE SURGERY  5/31/13    posterior [id number]    COLONOSCOPY  2009    COLONOSCOPY  12/29/2016    incomplete was not cleaned out    COLONOSCOPY  12/30/2016    COLONOSCOPY  04/25/2017     SIGMOID COLON POLYPECTOMY:  HYPERPLASTIC POLYP ,   DIVERTICULOSIS    EYE SURGERY Bilateral     CATARACTS W/ IOL    HERNIA REPAIR  1999    VENTRAL   

## 2018-03-30 ENCOUNTER — PATIENT MESSAGE (OUTPATIENT)
Dept: FAMILY MEDICINE CLINIC | Age: 73
End: 2018-03-30

## 2018-03-30 DIAGNOSIS — G89.4 CHRONIC PAIN SYNDROME: ICD-10-CM

## 2018-04-02 ENCOUNTER — PATIENT MESSAGE (OUTPATIENT)
Dept: FAMILY MEDICINE CLINIC | Age: 73
End: 2018-04-02

## 2018-04-03 ENCOUNTER — ANESTHESIA EVENT (OUTPATIENT)
Dept: OPERATING ROOM | Age: 73
End: 2018-04-03
Payer: COMMERCIAL

## 2018-04-03 RX ORDER — ONDANSETRON HYDROCHLORIDE 8 MG/1
8 TABLET, FILM COATED ORAL EVERY 8 HOURS PRN
Qty: 30 TABLET | Refills: 0 | Status: SHIPPED | OUTPATIENT
Start: 2018-04-03 | End: 2018-05-17 | Stop reason: SDUPTHER

## 2018-04-03 RX ORDER — HYDROCODONE BITARTRATE AND ACETAMINOPHEN 5; 325 MG/1; MG/1
1 TABLET ORAL 2 TIMES DAILY PRN
Qty: 60 TABLET | Refills: 0 | Status: SHIPPED | OUTPATIENT
Start: 2018-04-03 | End: 2018-05-07 | Stop reason: SDUPTHER

## 2018-04-04 ENCOUNTER — ANESTHESIA (OUTPATIENT)
Dept: OPERATING ROOM | Age: 73
End: 2018-04-04
Payer: COMMERCIAL

## 2018-04-04 ENCOUNTER — HOSPITAL ENCOUNTER (OUTPATIENT)
Age: 73
Setting detail: OUTPATIENT SURGERY
Discharge: HOME | End: 2018-04-04
Attending: UROLOGY | Admitting: UROLOGY
Payer: COMMERCIAL

## 2018-04-04 VITALS
DIASTOLIC BLOOD PRESSURE: 41 MMHG | SYSTOLIC BLOOD PRESSURE: 87 MMHG | RESPIRATION RATE: 10 BRPM | OXYGEN SATURATION: 100 %

## 2018-04-04 VITALS
TEMPERATURE: 97.5 F | HEART RATE: 64 BPM | WEIGHT: 175 LBS | SYSTOLIC BLOOD PRESSURE: 112 MMHG | RESPIRATION RATE: 16 BRPM | DIASTOLIC BLOOD PRESSURE: 55 MMHG | HEIGHT: 61 IN | BODY MASS INDEX: 33.04 KG/M2 | OXYGEN SATURATION: 100 %

## 2018-04-04 LAB
GFR NON-AFRICAN AMERICAN: 17 ML/MIN
GFR SERPL CREATININE-BSD FRML MDRD: 21 ML/MIN
GFR SERPL CREATININE-BSD FRML MDRD: ABNORMAL ML/MIN/{1.73_M2}
GLUCOSE BLD-MCNC: 101 MG/DL (ref 74–100)
POC CHLORIDE: 96 MMOL/L (ref 98–107)
POC CREATININE: 2.76 MG/DL (ref 0.51–1.19)
POC HEMATOCRIT: 29 % (ref 36–46)
POC HEMOGLOBIN: 9.9 G/DL (ref 12–16)
POC IONIZED CALCIUM: 1.1 MMOL/L (ref 1.15–1.33)
POC POTASSIUM: 5.1 MMOL/L (ref 3.5–4.5)
POC SODIUM: 142 MMOL/L (ref 138–146)

## 2018-04-04 PROCEDURE — 3700000001 HC ADD 15 MINUTES (ANESTHESIA): Performed by: UROLOGY

## 2018-04-04 PROCEDURE — 2580000003 HC RX 258: Performed by: ANESTHESIOLOGY

## 2018-04-04 PROCEDURE — 3600000003 HC SURGERY LEVEL 3 BASE: Performed by: UROLOGY

## 2018-04-04 PROCEDURE — 6360000002 HC RX W HCPCS: Performed by: UROLOGY

## 2018-04-04 PROCEDURE — 3600000013 HC SURGERY LEVEL 3 ADDTL 15MIN: Performed by: UROLOGY

## 2018-04-04 PROCEDURE — 6360000002 HC RX W HCPCS: Performed by: NURSE ANESTHETIST, CERTIFIED REGISTERED

## 2018-04-04 PROCEDURE — 7100000010 HC PHASE II RECOVERY - FIRST 15 MIN: Performed by: UROLOGY

## 2018-04-04 PROCEDURE — 82947 ASSAY GLUCOSE BLOOD QUANT: CPT

## 2018-04-04 PROCEDURE — 85014 HEMATOCRIT: CPT

## 2018-04-04 PROCEDURE — 84132 ASSAY OF SERUM POTASSIUM: CPT

## 2018-04-04 PROCEDURE — 7100000011 HC PHASE II RECOVERY - ADDTL 15 MIN: Performed by: UROLOGY

## 2018-04-04 PROCEDURE — 2580000003 HC RX 258: Performed by: UROLOGY

## 2018-04-04 PROCEDURE — 82565 ASSAY OF CREATININE: CPT

## 2018-04-04 PROCEDURE — 2500000003 HC RX 250 WO HCPCS: Performed by: NURSE ANESTHETIST, CERTIFIED REGISTERED

## 2018-04-04 PROCEDURE — 84295 ASSAY OF SERUM SODIUM: CPT

## 2018-04-04 PROCEDURE — 3700000000 HC ANESTHESIA ATTENDED CARE: Performed by: UROLOGY

## 2018-04-04 PROCEDURE — 82435 ASSAY OF BLOOD CHLORIDE: CPT

## 2018-04-04 PROCEDURE — 82330 ASSAY OF CALCIUM: CPT

## 2018-04-04 PROCEDURE — 2500000003 HC RX 250 WO HCPCS: Performed by: UROLOGY

## 2018-04-04 RX ORDER — FENTANYL CITRATE 50 UG/ML
25 INJECTION, SOLUTION INTRAMUSCULAR; INTRAVENOUS EVERY 5 MIN PRN
Status: DISCONTINUED | OUTPATIENT
Start: 2018-04-04 | End: 2018-04-04 | Stop reason: HOSPADM

## 2018-04-04 RX ORDER — PROPOFOL 10 MG/ML
INJECTION, EMULSION INTRAVENOUS PRN
Status: DISCONTINUED | OUTPATIENT
Start: 2018-04-04 | End: 2018-04-04 | Stop reason: SDUPTHER

## 2018-04-04 RX ORDER — FENTANYL CITRATE 50 UG/ML
50 INJECTION, SOLUTION INTRAMUSCULAR; INTRAVENOUS EVERY 5 MIN PRN
Status: DISCONTINUED | OUTPATIENT
Start: 2018-04-04 | End: 2018-04-04 | Stop reason: HOSPADM

## 2018-04-04 RX ORDER — LIDOCAINE HYDROCHLORIDE 10 MG/ML
INJECTION, SOLUTION INFILTRATION; PERINEURAL PRN
Status: DISCONTINUED | OUTPATIENT
Start: 2018-04-04 | End: 2018-04-04 | Stop reason: SDUPTHER

## 2018-04-04 RX ORDER — WATER 1000 ML/1000ML
INJECTION, SOLUTION INTRAVENOUS PRN
Status: DISCONTINUED | OUTPATIENT
Start: 2018-04-04 | End: 2018-04-04 | Stop reason: HOSPADM

## 2018-04-04 RX ORDER — SODIUM CHLORIDE, SODIUM LACTATE, POTASSIUM CHLORIDE, CALCIUM CHLORIDE 600; 310; 30; 20 MG/100ML; MG/100ML; MG/100ML; MG/100ML
INJECTION, SOLUTION INTRAVENOUS CONTINUOUS
Status: DISCONTINUED | OUTPATIENT
Start: 2018-04-04 | End: 2018-04-04 | Stop reason: HOSPADM

## 2018-04-04 RX ORDER — MIDAZOLAM HYDROCHLORIDE 1 MG/ML
INJECTION INTRAMUSCULAR; INTRAVENOUS PRN
Status: DISCONTINUED | OUTPATIENT
Start: 2018-04-04 | End: 2018-04-04 | Stop reason: SDUPTHER

## 2018-04-04 RX ADMIN — PROPOFOL 20 MG: 10 INJECTION, EMULSION INTRAVENOUS at 09:16

## 2018-04-04 RX ADMIN — PROPOFOL 20 MG: 10 INJECTION, EMULSION INTRAVENOUS at 09:28

## 2018-04-04 RX ADMIN — PROPOFOL 20 MG: 10 INJECTION, EMULSION INTRAVENOUS at 09:15

## 2018-04-04 RX ADMIN — MIDAZOLAM HYDROCHLORIDE 1 MG: 1 INJECTION, SOLUTION INTRAMUSCULAR; INTRAVENOUS at 09:15

## 2018-04-04 RX ADMIN — TOBRAMYCIN SULFATE 80 MG: 40 INJECTION, SOLUTION INTRAMUSCULAR; INTRAVENOUS at 09:28

## 2018-04-04 RX ADMIN — PROPOFOL 20 MG: 10 INJECTION, EMULSION INTRAVENOUS at 09:20

## 2018-04-04 RX ADMIN — PROPOFOL 20 MG: 10 INJECTION, EMULSION INTRAVENOUS at 09:24

## 2018-04-04 RX ADMIN — SODIUM CHLORIDE, POTASSIUM CHLORIDE, SODIUM LACTATE AND CALCIUM CHLORIDE: 600; 310; 30; 20 INJECTION, SOLUTION INTRAVENOUS at 08:39

## 2018-04-04 RX ADMIN — MIDAZOLAM HYDROCHLORIDE 1 MG: 1 INJECTION, SOLUTION INTRAMUSCULAR; INTRAVENOUS at 09:20

## 2018-04-04 RX ADMIN — PROPOFOL 20 MG: 10 INJECTION, EMULSION INTRAVENOUS at 09:32

## 2018-04-04 RX ADMIN — LIDOCAINE HYDROCHLORIDE 50 MG: 10 INJECTION, SOLUTION INFILTRATION; PERINEURAL at 09:14

## 2018-04-04 RX ADMIN — PROPOFOL 20 MG: 10 INJECTION, EMULSION INTRAVENOUS at 09:18

## 2018-04-04 RX ADMIN — PROPOFOL 50 MG: 10 INJECTION, EMULSION INTRAVENOUS at 09:14

## 2018-04-04 RX ADMIN — PROPOFOL 20 MG: 10 INJECTION, EMULSION INTRAVENOUS at 09:26

## 2018-04-04 RX ADMIN — PROPOFOL 20 MG: 10 INJECTION, EMULSION INTRAVENOUS at 09:17

## 2018-04-04 RX ADMIN — PROPOFOL 20 MG: 10 INJECTION, EMULSION INTRAVENOUS at 09:30

## 2018-04-04 RX ADMIN — PROPOFOL 20 MG: 10 INJECTION, EMULSION INTRAVENOUS at 09:22

## 2018-04-04 ASSESSMENT — PAIN SCALES - GENERAL
PAINLEVEL_OUTOF10: 0

## 2018-04-04 ASSESSMENT — PULMONARY FUNCTION TESTS
PIF_VALUE: 1
PIF_VALUE: 0
PIF_VALUE: 1
PIF_VALUE: 0
PIF_VALUE: 1
PIF_VALUE: 0
PIF_VALUE: 1
PIF_VALUE: 0
PIF_VALUE: 1

## 2018-04-04 ASSESSMENT — COPD QUESTIONNAIRES: CAT_SEVERITY: SEVERE

## 2018-04-04 ASSESSMENT — PAIN - FUNCTIONAL ASSESSMENT: PAIN_FUNCTIONAL_ASSESSMENT: 0-10

## 2018-04-04 ASSESSMENT — LIFESTYLE VARIABLES: SMOKING_STATUS: 0

## 2018-04-05 LAB — GLUCOSE BLD-MCNC: 129 MG/DL (ref 65–105)

## 2018-04-06 ENCOUNTER — HOSPITAL ENCOUNTER (OUTPATIENT)
Dept: NUCLEAR MEDICINE | Age: 73
Discharge: HOME | End: 2018-04-08
Payer: COMMERCIAL

## 2018-04-06 DIAGNOSIS — R93.5 ABNORMAL CT OF THE ABDOMEN: ICD-10-CM

## 2018-04-06 DIAGNOSIS — R11.14 BILIOUS VOMITING WITH NAUSEA: ICD-10-CM

## 2018-04-06 PROCEDURE — 2580000003 HC RX 258

## 2018-04-06 PROCEDURE — 6360000004 HC RX CONTRAST MEDICATION

## 2018-04-06 PROCEDURE — 3430000000 HC RX DIAGNOSTIC RADIOPHARMACEUTICAL: Performed by: FAMILY MEDICINE

## 2018-04-06 PROCEDURE — A9537 TC99M MEBROFENIN: HCPCS | Performed by: FAMILY MEDICINE

## 2018-04-06 PROCEDURE — 78226 HEPATOBILIARY SYSTEM IMAGING: CPT

## 2018-04-06 RX ADMIN — MEBROFENIN 5.8 MILLICURIE: 45 INJECTION, POWDER, LYOPHILIZED, FOR SOLUTION INTRAVENOUS at 09:20

## 2018-04-10 ENCOUNTER — PATIENT MESSAGE (OUTPATIENT)
Dept: FAMILY MEDICINE CLINIC | Age: 73
End: 2018-04-10

## 2018-04-16 ENCOUNTER — HOSPITAL ENCOUNTER (OUTPATIENT)
Dept: ULTRASOUND IMAGING | Age: 73
Discharge: HOME | End: 2018-04-18
Payer: COMMERCIAL

## 2018-04-16 DIAGNOSIS — R31.0 GROSS HEMATURIA: ICD-10-CM

## 2018-04-16 PROCEDURE — 76770 US EXAM ABDO BACK WALL COMP: CPT

## 2018-04-17 ENCOUNTER — PATIENT MESSAGE (OUTPATIENT)
Dept: FAMILY MEDICINE CLINIC | Age: 73
End: 2018-04-17

## 2018-04-18 ENCOUNTER — PATIENT MESSAGE (OUTPATIENT)
Dept: FAMILY MEDICINE CLINIC | Age: 73
End: 2018-04-18

## 2018-04-25 ENCOUNTER — HOSPITAL ENCOUNTER (OUTPATIENT)
Facility: MEDICAL CENTER | Age: 73
Discharge: HOME | End: 2018-04-25
Payer: COMMERCIAL

## 2018-04-25 ENCOUNTER — INITIAL CONSULT (OUTPATIENT)
Dept: ONCOLOGY | Age: 73
End: 2018-04-25
Payer: COMMERCIAL

## 2018-04-25 ENCOUNTER — TELEPHONE (OUTPATIENT)
Dept: ONCOLOGY | Age: 73
End: 2018-04-25

## 2018-04-25 VITALS
HEART RATE: 66 BPM | SYSTOLIC BLOOD PRESSURE: 132 MMHG | WEIGHT: 177 LBS | DIASTOLIC BLOOD PRESSURE: 46 MMHG | BODY MASS INDEX: 33.42 KG/M2 | TEMPERATURE: 98.6 F | RESPIRATION RATE: 18 BRPM | HEIGHT: 61 IN

## 2018-04-25 DIAGNOSIS — E61.1 IRON DEFICIENCY: ICD-10-CM

## 2018-04-25 DIAGNOSIS — K90.89 OTHER SPECIFIED INTESTINAL MALABSORPTION: ICD-10-CM

## 2018-04-25 DIAGNOSIS — E11.22 CKD STAGE 4 DUE TO TYPE 2 DIABETES MELLITUS (HCC): Chronic | ICD-10-CM

## 2018-04-25 DIAGNOSIS — D50.0 IRON DEFICIENCY ANEMIA DUE TO CHRONIC BLOOD LOSS: ICD-10-CM

## 2018-04-25 DIAGNOSIS — N36.2 URETHRAL CARUNCLE: ICD-10-CM

## 2018-04-25 DIAGNOSIS — D64.9 ANEMIA, UNSPECIFIED TYPE: ICD-10-CM

## 2018-04-25 DIAGNOSIS — D64.9 ANEMIA, UNSPECIFIED TYPE: Primary | ICD-10-CM

## 2018-04-25 DIAGNOSIS — N18.4 CKD STAGE 4 DUE TO TYPE 2 DIABETES MELLITUS (HCC): Chronic | ICD-10-CM

## 2018-04-25 PROBLEM — K90.9 MALABSORPTION: Status: ACTIVE | Noted: 2018-04-25

## 2018-04-25 LAB
ABSOLUTE EOS #: 0.1 K/UL (ref 0–0.4)
ABSOLUTE IMMATURE GRANULOCYTE: ABNORMAL K/UL (ref 0–0.3)
ABSOLUTE LYMPH #: 1.6 K/UL (ref 1–4.8)
ABSOLUTE MONO #: 0.5 K/UL (ref 0.2–0.8)
ABSOLUTE RETIC #: 0.07 M/UL (ref 0.02–0.1)
ALBUMIN SERPL-MCNC: 4.6 G/DL (ref 3.5–5.2)
ALBUMIN/GLOBULIN RATIO: ABNORMAL (ref 1–2.5)
ALP BLD-CCNC: 49 U/L (ref 35–104)
ALT SERPL-CCNC: 7 U/L (ref 5–33)
AST SERPL-CCNC: 19 U/L
BASOPHILS # BLD: 0 % (ref 0–2)
BASOPHILS ABSOLUTE: 0 K/UL (ref 0–0.2)
BILIRUB SERPL-MCNC: 0.21 MG/DL (ref 0.3–1.2)
BILIRUBIN DIRECT: <0.08 MG/DL
BILIRUBIN, INDIRECT: ABNORMAL MG/DL (ref 0–1)
DIFFERENTIAL TYPE: ABNORMAL
EOSINOPHILS RELATIVE PERCENT: 2 % (ref 1–4)
FERRITIN: 36 UG/L (ref 13–150)
FOLATE: >20 NG/ML
GLOBULIN: ABNORMAL G/DL (ref 1.5–3.8)
HAPTOGLOBIN: 240 MG/DL (ref 30–200)
HCT VFR BLD CALC: 30.7 % (ref 36–46)
HEMOGLOBIN: 9.9 G/DL (ref 12–16)
IMMATURE GRANULOCYTES: ABNORMAL %
IMMATURE RETIC FRACT: ABNORMAL %
IRON SATURATION: 12 % (ref 20–55)
IRON: 43 UG/DL (ref 37–145)
LYMPHOCYTES # BLD: 17 % (ref 24–44)
MCH RBC QN AUTO: 31.6 PG (ref 26–34)
MCHC RBC AUTO-ENTMCNC: 32.2 G/DL (ref 31–37)
MCV RBC AUTO: 98.2 FL (ref 80–100)
MONOCYTES # BLD: 6 % (ref 1–7)
NRBC AUTOMATED: ABNORMAL PER 100 WBC
PDW BLD-RTO: 15.2 % (ref 11.5–14.5)
PLATELET # BLD: 172 K/UL (ref 130–400)
PLATELET ESTIMATE: ABNORMAL
PMV BLD AUTO: 8.2 FL (ref 6–12)
RBC # BLD: 3.13 M/UL (ref 4–5.2)
RBC # BLD: ABNORMAL 10*6/UL
RETIC %: 2.2 % (ref 0.5–2)
RETIC HEMOGLOBIN: ABNORMAL PG (ref 28.2–35.7)
SEG NEUTROPHILS: 75 % (ref 36–66)
SEGMENTED NEUTROPHILS ABSOLUTE COUNT: 6.9 K/UL (ref 1.8–7.7)
TOTAL IRON BINDING CAPACITY: 348 UG/DL (ref 250–450)
TOTAL PROTEIN: 7.4 G/DL (ref 6.4–8.3)
TSH SERPL DL<=0.05 MIU/L-ACNC: 3.34 MIU/L (ref 0.3–5)
UNSATURATED IRON BINDING CAPACITY: 305 UG/DL (ref 112–347)
VITAMIN B-12: 959 PG/ML (ref 232–1245)
WBC # BLD: 9.2 K/UL (ref 3.5–11)
WBC # BLD: ABNORMAL 10*3/UL

## 2018-04-25 PROCEDURE — 1090F PRES/ABSN URINE INCON ASSESS: CPT | Performed by: INTERNAL MEDICINE

## 2018-04-25 PROCEDURE — 82607 VITAMIN B-12: CPT

## 2018-04-25 PROCEDURE — G8417 CALC BMI ABV UP PARAM F/U: HCPCS | Performed by: INTERNAL MEDICINE

## 2018-04-25 PROCEDURE — 99201 HC NEW PT, E/M LEVEL 1: CPT

## 2018-04-25 PROCEDURE — 82746 ASSAY OF FOLIC ACID SERUM: CPT

## 2018-04-25 PROCEDURE — 80076 HEPATIC FUNCTION PANEL: CPT

## 2018-04-25 PROCEDURE — G8427 DOCREV CUR MEDS BY ELIG CLIN: HCPCS | Performed by: INTERNAL MEDICINE

## 2018-04-25 PROCEDURE — 36415 COLL VENOUS BLD VENIPUNCTURE: CPT

## 2018-04-25 PROCEDURE — 82728 ASSAY OF FERRITIN: CPT

## 2018-04-25 PROCEDURE — 83540 ASSAY OF IRON: CPT

## 2018-04-25 PROCEDURE — 2022F DILAT RTA XM EVC RTNOPTHY: CPT | Performed by: INTERNAL MEDICINE

## 2018-04-25 PROCEDURE — 99245 OFF/OP CONSLTJ NEW/EST HI 55: CPT | Performed by: INTERNAL MEDICINE

## 2018-04-25 PROCEDURE — 85025 COMPLETE CBC W/AUTO DIFF WBC: CPT

## 2018-04-25 PROCEDURE — 85045 AUTOMATED RETICULOCYTE COUNT: CPT

## 2018-04-25 PROCEDURE — 3017F COLORECTAL CA SCREEN DOC REV: CPT | Performed by: INTERNAL MEDICINE

## 2018-04-25 PROCEDURE — 84443 ASSAY THYROID STIM HORMONE: CPT

## 2018-04-25 PROCEDURE — 83010 ASSAY OF HAPTOGLOBIN QUANT: CPT

## 2018-04-25 PROCEDURE — 83550 IRON BINDING TEST: CPT

## 2018-04-25 RX ORDER — SODIUM CHLORIDE 0.9 % (FLUSH) 0.9 %
5 SYRINGE (ML) INJECTION PRN
Status: CANCELLED | OUTPATIENT
Start: 2018-04-25

## 2018-04-25 RX ORDER — SODIUM CHLORIDE 0.9 % (FLUSH) 0.9 %
10 SYRINGE (ML) INJECTION PRN
Status: CANCELLED | OUTPATIENT
Start: 2018-04-25

## 2018-04-25 RX ORDER — DIPHENHYDRAMINE HYDROCHLORIDE 50 MG/ML
25 INJECTION INTRAMUSCULAR; INTRAVENOUS ONCE
Status: CANCELLED | OUTPATIENT
Start: 2018-04-25 | End: 2018-04-25

## 2018-04-25 RX ORDER — SODIUM CHLORIDE 9 MG/ML
INJECTION, SOLUTION INTRAVENOUS CONTINUOUS
Status: CANCELLED | OUTPATIENT
Start: 2018-04-25

## 2018-04-25 RX ORDER — METHYLPREDNISOLONE SODIUM SUCCINATE 125 MG/2ML
125 INJECTION, POWDER, LYOPHILIZED, FOR SOLUTION INTRAMUSCULAR; INTRAVENOUS ONCE
Status: CANCELLED | OUTPATIENT
Start: 2018-04-25 | End: 2018-04-25

## 2018-04-25 RX ORDER — HEPARIN SODIUM (PORCINE) LOCK FLUSH IV SOLN 100 UNIT/ML 100 UNIT/ML
500 SOLUTION INTRAVENOUS PRN
Status: CANCELLED | OUTPATIENT
Start: 2018-04-25

## 2018-04-25 RX ORDER — 0.9 % SODIUM CHLORIDE 0.9 %
10 VIAL (ML) INJECTION ONCE
Status: CANCELLED | OUTPATIENT
Start: 2018-04-25 | End: 2018-04-25

## 2018-04-25 RX ORDER — DIPHENHYDRAMINE HYDROCHLORIDE 50 MG/ML
50 INJECTION INTRAMUSCULAR; INTRAVENOUS ONCE
Status: CANCELLED | OUTPATIENT
Start: 2018-04-25 | End: 2018-04-25

## 2018-04-25 RX ORDER — SODIUM CHLORIDE 9 MG/ML
INJECTION, SOLUTION INTRAVENOUS ONCE
Status: CANCELLED | OUTPATIENT
Start: 2018-04-25 | End: 2018-04-25

## 2018-04-26 ENCOUNTER — TELEPHONE (OUTPATIENT)
Dept: INFUSION THERAPY | Facility: MEDICAL CENTER | Age: 73
End: 2018-04-26

## 2018-05-06 ENCOUNTER — PATIENT MESSAGE (OUTPATIENT)
Dept: FAMILY MEDICINE CLINIC | Age: 73
End: 2018-05-06

## 2018-05-07 DIAGNOSIS — G89.4 CHRONIC PAIN SYNDROME: ICD-10-CM

## 2018-05-07 RX ORDER — HYDROCODONE BITARTRATE AND ACETAMINOPHEN 5; 325 MG/1; MG/1
1 TABLET ORAL 2 TIMES DAILY PRN
Qty: 60 TABLET | Refills: 0 | Status: ON HOLD | OUTPATIENT
Start: 2018-05-07 | End: 2018-06-07 | Stop reason: HOSPADM

## 2018-05-08 RX ORDER — SODIUM CHLORIDE 9 MG/ML
INJECTION, SOLUTION INTRAVENOUS CONTINUOUS
Status: CANCELLED | OUTPATIENT
Start: 2018-05-16

## 2018-05-08 RX ORDER — SODIUM CHLORIDE 0.9 % (FLUSH) 0.9 %
10 SYRINGE (ML) INJECTION PRN
Status: CANCELLED | OUTPATIENT
Start: 2018-05-16

## 2018-05-08 RX ORDER — EPINEPHRINE 1 MG/ML
0.3 INJECTION, SOLUTION, CONCENTRATE INTRAVENOUS PRN
Status: CANCELLED | OUTPATIENT
Start: 2018-05-16

## 2018-05-08 RX ORDER — 0.9 % SODIUM CHLORIDE 0.9 %
10 VIAL (ML) INJECTION ONCE
Status: CANCELLED | OUTPATIENT
Start: 2018-05-16 | End: 2018-05-09

## 2018-05-08 RX ORDER — DIPHENHYDRAMINE HYDROCHLORIDE 50 MG/ML
50 INJECTION INTRAMUSCULAR; INTRAVENOUS ONCE
Status: CANCELLED | OUTPATIENT
Start: 2018-05-16 | End: 2018-05-09

## 2018-05-08 RX ORDER — METHYLPREDNISOLONE SODIUM SUCCINATE 125 MG/2ML
125 INJECTION, POWDER, LYOPHILIZED, FOR SOLUTION INTRAMUSCULAR; INTRAVENOUS ONCE
Status: CANCELLED | OUTPATIENT
Start: 2018-05-16 | End: 2018-05-09

## 2018-05-08 RX ORDER — HEPARIN SODIUM (PORCINE) LOCK FLUSH IV SOLN 100 UNIT/ML 100 UNIT/ML
500 SOLUTION INTRAVENOUS PRN
Status: CANCELLED | OUTPATIENT
Start: 2018-05-16

## 2018-05-08 RX ORDER — SODIUM CHLORIDE 0.9 % (FLUSH) 0.9 %
5 SYRINGE (ML) INJECTION PRN
Status: CANCELLED | OUTPATIENT
Start: 2018-05-16

## 2018-05-08 RX ORDER — SODIUM CHLORIDE 9 MG/ML
INJECTION, SOLUTION INTRAVENOUS ONCE
Status: CANCELLED | OUTPATIENT
Start: 2018-05-16 | End: 2018-05-09

## 2018-05-11 ENCOUNTER — PATIENT MESSAGE (OUTPATIENT)
Dept: FAMILY MEDICINE CLINIC | Age: 73
End: 2018-05-11

## 2018-05-13 ENCOUNTER — PATIENT MESSAGE (OUTPATIENT)
Dept: FAMILY MEDICINE CLINIC | Age: 73
End: 2018-05-13

## 2018-05-16 ENCOUNTER — HOSPITAL ENCOUNTER (OUTPATIENT)
Dept: INFUSION THERAPY | Facility: MEDICAL CENTER | Age: 73
Discharge: HOME | End: 2018-05-16
Payer: COMMERCIAL

## 2018-05-16 VITALS
HEART RATE: 62 BPM | RESPIRATION RATE: 18 BRPM | TEMPERATURE: 98.5 F | SYSTOLIC BLOOD PRESSURE: 149 MMHG | DIASTOLIC BLOOD PRESSURE: 71 MMHG

## 2018-05-16 DIAGNOSIS — E61.1 IRON DEFICIENCY: ICD-10-CM

## 2018-05-16 DIAGNOSIS — E11.22 CKD STAGE 4 DUE TO TYPE 2 DIABETES MELLITUS (HCC): Primary | Chronic | ICD-10-CM

## 2018-05-16 DIAGNOSIS — K90.89 OTHER SPECIFIED INTESTINAL MALABSORPTION: ICD-10-CM

## 2018-05-16 DIAGNOSIS — D64.9 ANEMIA, UNSPECIFIED TYPE: ICD-10-CM

## 2018-05-16 DIAGNOSIS — N18.4 CKD STAGE 4 DUE TO TYPE 2 DIABETES MELLITUS (HCC): Primary | Chronic | ICD-10-CM

## 2018-05-16 DIAGNOSIS — N20.0 NEPHROLITHIASIS: Chronic | ICD-10-CM

## 2018-05-16 PROBLEM — I25.10 CORONARY ATHEROSCLEROSIS: Status: ACTIVE | Noted: 2017-11-27

## 2018-05-16 PROCEDURE — 2580000003 HC RX 258: Performed by: INTERNAL MEDICINE

## 2018-05-16 PROCEDURE — 6360000002 HC RX W HCPCS: Performed by: INTERNAL MEDICINE

## 2018-05-16 PROCEDURE — 96365 THER/PROPH/DIAG IV INF INIT: CPT

## 2018-05-16 PROCEDURE — 96374 THER/PROPH/DIAG INJ IV PUSH: CPT

## 2018-05-16 RX ORDER — HEPARIN SODIUM (PORCINE) LOCK FLUSH IV SOLN 100 UNIT/ML 100 UNIT/ML
500 SOLUTION INTRAVENOUS PRN
Status: CANCELLED | OUTPATIENT
Start: 2018-05-16

## 2018-05-16 RX ORDER — SODIUM CHLORIDE 0.9 % (FLUSH) 0.9 %
10 SYRINGE (ML) INJECTION PRN
Status: DISCONTINUED | OUTPATIENT
Start: 2018-05-16 | End: 2018-05-17 | Stop reason: HOSPADM

## 2018-05-16 RX ORDER — DIPHENHYDRAMINE HYDROCHLORIDE 50 MG/ML
50 INJECTION INTRAMUSCULAR; INTRAVENOUS ONCE
Status: CANCELLED | OUTPATIENT
Start: 2018-05-16 | End: 2018-05-16

## 2018-05-16 RX ORDER — HEPARIN SODIUM (PORCINE) LOCK FLUSH IV SOLN 100 UNIT/ML 100 UNIT/ML
500 SOLUTION INTRAVENOUS PRN
Status: DISCONTINUED | OUTPATIENT
Start: 2018-05-16 | End: 2018-05-17 | Stop reason: HOSPADM

## 2018-05-16 RX ORDER — 0.9 % SODIUM CHLORIDE 0.9 %
10 VIAL (ML) INJECTION ONCE
Status: CANCELLED | OUTPATIENT
Start: 2018-05-16 | End: 2018-05-16

## 2018-05-16 RX ORDER — EPINEPHRINE 1 MG/ML
0.3 INJECTION, SOLUTION, CONCENTRATE INTRAVENOUS PRN
Status: CANCELLED | OUTPATIENT
Start: 2018-05-16

## 2018-05-16 RX ORDER — SODIUM CHLORIDE 9 MG/ML
INJECTION, SOLUTION INTRAVENOUS ONCE
Status: COMPLETED | OUTPATIENT
Start: 2018-05-16 | End: 2018-05-16

## 2018-05-16 RX ORDER — SODIUM CHLORIDE 0.9 % (FLUSH) 0.9 %
10 SYRINGE (ML) INJECTION PRN
Status: CANCELLED | OUTPATIENT
Start: 2018-05-16

## 2018-05-16 RX ORDER — SODIUM CHLORIDE 0.9 % (FLUSH) 0.9 %
5 SYRINGE (ML) INJECTION PRN
Status: CANCELLED | OUTPATIENT
Start: 2018-05-16

## 2018-05-16 RX ORDER — SODIUM CHLORIDE 9 MG/ML
INJECTION, SOLUTION INTRAVENOUS ONCE
Status: CANCELLED | OUTPATIENT
Start: 2018-05-16 | End: 2018-05-16

## 2018-05-16 RX ORDER — METHYLPREDNISOLONE SODIUM SUCCINATE 125 MG/2ML
125 INJECTION, POWDER, LYOPHILIZED, FOR SOLUTION INTRAMUSCULAR; INTRAVENOUS ONCE
Status: CANCELLED | OUTPATIENT
Start: 2018-05-16 | End: 2018-05-16

## 2018-05-16 RX ORDER — SODIUM CHLORIDE 9 MG/ML
INJECTION, SOLUTION INTRAVENOUS CONTINUOUS
Status: CANCELLED | OUTPATIENT
Start: 2018-05-16

## 2018-05-16 RX ADMIN — FERRIC CARBOXYMALTOSE INJECTION 750 MG: 50 INJECTION, SOLUTION INTRAVENOUS at 10:05

## 2018-05-16 RX ADMIN — SODIUM CHLORIDE: 9 INJECTION, SOLUTION INTRAVENOUS at 09:45

## 2018-05-18 RX ORDER — ONDANSETRON HYDROCHLORIDE 8 MG/1
8 TABLET, FILM COATED ORAL EVERY 8 HOURS PRN
Qty: 30 TABLET | Refills: 0 | Status: ON HOLD | OUTPATIENT
Start: 2018-05-18 | End: 2018-06-12 | Stop reason: HOSPADM

## 2018-05-21 ENCOUNTER — PATIENT MESSAGE (OUTPATIENT)
Dept: FAMILY MEDICINE CLINIC | Age: 73
End: 2018-05-21

## 2018-05-23 ENCOUNTER — HOSPITAL ENCOUNTER (OUTPATIENT)
Dept: INFUSION THERAPY | Facility: MEDICAL CENTER | Age: 73
Discharge: HOME | End: 2018-05-23
Payer: COMMERCIAL

## 2018-05-23 ENCOUNTER — HOSPITAL ENCOUNTER (OUTPATIENT)
Dept: INTERVENTIONAL RADIOLOGY/VASCULAR | Age: 73
Discharge: HOME | End: 2018-05-25
Payer: MEDICARE

## 2018-05-23 VITALS
DIASTOLIC BLOOD PRESSURE: 54 MMHG | RESPIRATION RATE: 16 BRPM | TEMPERATURE: 98.1 F | SYSTOLIC BLOOD PRESSURE: 124 MMHG | HEART RATE: 68 BPM

## 2018-05-23 DIAGNOSIS — E11.22 CKD STAGE 4 DUE TO TYPE 2 DIABETES MELLITUS (HCC): Chronic | ICD-10-CM

## 2018-05-23 DIAGNOSIS — K90.89 OTHER SPECIFIED INTESTINAL MALABSORPTION: ICD-10-CM

## 2018-05-23 DIAGNOSIS — N18.4 CKD STAGE 4 DUE TO TYPE 2 DIABETES MELLITUS (HCC): Chronic | ICD-10-CM

## 2018-05-23 DIAGNOSIS — E61.1 IRON DEFICIENCY: ICD-10-CM

## 2018-05-23 DIAGNOSIS — Z12.31 SCREENING MAMMOGRAM, ENCOUNTER FOR: ICD-10-CM

## 2018-05-23 DIAGNOSIS — D64.9 ANEMIA, UNSPECIFIED TYPE: ICD-10-CM

## 2018-05-23 PROCEDURE — 6360000002 HC RX W HCPCS: Performed by: INTERNAL MEDICINE

## 2018-05-23 PROCEDURE — 96375 TX/PRO/DX INJ NEW DRUG ADDON: CPT

## 2018-05-23 PROCEDURE — 2580000003 HC RX 258: Performed by: INTERNAL MEDICINE

## 2018-05-23 PROCEDURE — 96365 THER/PROPH/DIAG IV INF INIT: CPT

## 2018-05-23 RX ORDER — 0.9 % SODIUM CHLORIDE 0.9 %
10 VIAL (ML) INJECTION ONCE
Status: CANCELLED | OUTPATIENT
Start: 2018-05-23 | End: 2018-05-23

## 2018-05-23 RX ORDER — METHYLPREDNISOLONE SODIUM SUCCINATE 125 MG/2ML
125 INJECTION, POWDER, LYOPHILIZED, FOR SOLUTION INTRAMUSCULAR; INTRAVENOUS ONCE
Status: CANCELLED | OUTPATIENT
Start: 2018-05-23 | End: 2018-05-23

## 2018-05-23 RX ORDER — DIPHENHYDRAMINE HYDROCHLORIDE 50 MG/ML
50 INJECTION INTRAMUSCULAR; INTRAVENOUS ONCE
Status: CANCELLED | OUTPATIENT
Start: 2018-05-23 | End: 2018-05-23

## 2018-05-23 RX ORDER — EPINEPHRINE 1 MG/ML
0.3 INJECTION, SOLUTION, CONCENTRATE INTRAVENOUS PRN
Status: CANCELLED | OUTPATIENT
Start: 2018-05-23

## 2018-05-23 RX ORDER — SODIUM CHLORIDE 0.9 % (FLUSH) 0.9 %
5 SYRINGE (ML) INJECTION PRN
Status: CANCELLED | OUTPATIENT
Start: 2018-05-23

## 2018-05-23 RX ORDER — SODIUM CHLORIDE 9 MG/ML
INJECTION, SOLUTION INTRAVENOUS ONCE
Status: COMPLETED | OUTPATIENT
Start: 2018-05-23 | End: 2018-05-23

## 2018-05-23 RX ORDER — SODIUM CHLORIDE 0.9 % (FLUSH) 0.9 %
10 SYRINGE (ML) INJECTION PRN
Status: CANCELLED | OUTPATIENT
Start: 2018-05-23

## 2018-05-23 RX ORDER — SODIUM CHLORIDE 9 MG/ML
INJECTION, SOLUTION INTRAVENOUS CONTINUOUS
Status: CANCELLED | OUTPATIENT
Start: 2018-05-23

## 2018-05-23 RX ORDER — HEPARIN SODIUM (PORCINE) LOCK FLUSH IV SOLN 100 UNIT/ML 100 UNIT/ML
500 SOLUTION INTRAVENOUS PRN
Status: CANCELLED | OUTPATIENT
Start: 2018-05-23

## 2018-05-23 RX ORDER — SODIUM CHLORIDE 9 MG/ML
INJECTION, SOLUTION INTRAVENOUS ONCE
Status: CANCELLED | OUTPATIENT
Start: 2018-05-23 | End: 2018-05-23

## 2018-05-23 RX ADMIN — FERRIC CARBOXYMALTOSE INJECTION 750 MG: 50 INJECTION, SOLUTION INTRAVENOUS at 14:09

## 2018-05-23 RX ADMIN — SODIUM CHLORIDE: 9 INJECTION, SOLUTION INTRAVENOUS at 14:08

## 2018-05-23 NOTE — PROGRESS NOTES
Liliana Bernabe arrives per wheelchair from IR  Patient has iv to lt upper arm that was put in in IR  Iv line flushes well   Good blood return noted  See MAR for injectafor infusion  Tolerated well  Iv line flushed for thirty minutes post Injectafor and no reaction noted  Iv line discontinued with needle intact  Pressure dressing applied  Discharged per wheelchair

## 2018-05-30 ENCOUNTER — PATIENT MESSAGE (OUTPATIENT)
Dept: FAMILY MEDICINE CLINIC | Age: 73
End: 2018-05-30

## 2018-05-31 ENCOUNTER — APPOINTMENT (OUTPATIENT)
Dept: GENERAL RADIOLOGY | Age: 73
DRG: 190 | End: 2018-05-31
Payer: COMMERCIAL

## 2018-05-31 ENCOUNTER — HOSPITAL ENCOUNTER (INPATIENT)
Age: 73
LOS: 7 days | Discharge: NURSING, REHAB OR LONG-TERM CARE | DRG: 190 | End: 2018-06-07
Attending: EMERGENCY MEDICINE | Admitting: INTERNAL MEDICINE
Payer: COMMERCIAL

## 2018-05-31 DIAGNOSIS — Z99.89 BIPAP (BIPHASIC POSITIVE AIRWAY PRESSURE) DEPENDENCE: ICD-10-CM

## 2018-05-31 DIAGNOSIS — D64.9 CHRONIC ANEMIA: ICD-10-CM

## 2018-05-31 DIAGNOSIS — J44.1 COPD EXACERBATION (HCC): Primary | ICD-10-CM

## 2018-05-31 DIAGNOSIS — G89.29 CHRONIC PAIN OF RIGHT LOWER EXTREMITY: ICD-10-CM

## 2018-05-31 DIAGNOSIS — F41.9 ANXIETY: ICD-10-CM

## 2018-05-31 DIAGNOSIS — M48.02 SPINAL STENOSIS IN CERVICAL REGION: ICD-10-CM

## 2018-05-31 DIAGNOSIS — R09.02 HYPOXIA: ICD-10-CM

## 2018-05-31 DIAGNOSIS — R79.89 ELEVATED SERUM CREATININE: ICD-10-CM

## 2018-05-31 DIAGNOSIS — M79.604 CHRONIC PAIN OF RIGHT LOWER EXTREMITY: ICD-10-CM

## 2018-05-31 DIAGNOSIS — R79.9 ELEVATED BUN: ICD-10-CM

## 2018-05-31 DIAGNOSIS — R06.00 DYSPNEA, UNSPECIFIED TYPE: ICD-10-CM

## 2018-05-31 LAB
ABSOLUTE EOS #: 0 K/UL (ref 0–0.4)
ABSOLUTE IMMATURE GRANULOCYTE: ABNORMAL K/UL (ref 0–0.3)
ABSOLUTE LYMPH #: 0.8 K/UL (ref 1–4.8)
ABSOLUTE MONO #: 0.6 K/UL (ref 0.2–0.8)
ANION GAP SERPL CALCULATED.3IONS-SCNC: 14 MMOL/L (ref 9–17)
BASOPHILS # BLD: 0 % (ref 0–2)
BASOPHILS ABSOLUTE: 0 K/UL (ref 0–0.2)
BNP INTERPRETATION: ABNORMAL
BUN BLDV-MCNC: 33 MG/DL (ref 8–23)
BUN/CREAT BLD: 20 (ref 9–20)
CALCIUM SERPL-MCNC: 8.2 MG/DL (ref 8.6–10.4)
CHLORIDE BLD-SCNC: 95 MMOL/L (ref 98–107)
CO2: 34 MMOL/L (ref 20–31)
CREAT SERPL-MCNC: 1.66 MG/DL (ref 0.5–0.9)
DIFFERENTIAL TYPE: ABNORMAL
EKG ATRIAL RATE: 89 BPM
EKG P AXIS: 1 DEGREES
EKG P-R INTERVAL: 150 MS
EKG Q-T INTERVAL: 382 MS
EKG QRS DURATION: 84 MS
EKG QTC CALCULATION (BAZETT): 464 MS
EKG R AXIS: -27 DEGREES
EKG T AXIS: 50 DEGREES
EKG VENTRICULAR RATE: 89 BPM
EOSINOPHILS RELATIVE PERCENT: 0 % (ref 1–4)
GFR AFRICAN AMERICAN: 37 ML/MIN
GFR NON-AFRICAN AMERICAN: 31 ML/MIN
GFR SERPL CREATININE-BSD FRML MDRD: ABNORMAL ML/MIN/{1.73_M2}
GFR SERPL CREATININE-BSD FRML MDRD: ABNORMAL ML/MIN/{1.73_M2}
GLUCOSE BLD-MCNC: 128 MG/DL (ref 70–99)
GLUCOSE BLD-MCNC: 155 MG/DL (ref 65–105)
GLUCOSE BLD-MCNC: 233 MG/DL (ref 65–105)
GLUCOSE BLD-MCNC: 277 MG/DL (ref 65–105)
HCT VFR BLD CALC: 26.5 % (ref 36–46)
HEMOGLOBIN: 8.6 G/DL (ref 12–16)
IMMATURE GRANULOCYTES: ABNORMAL %
LYMPHOCYTES # BLD: 9 % (ref 24–44)
MCH RBC QN AUTO: 31.7 PG (ref 26–34)
MCHC RBC AUTO-ENTMCNC: 32.4 G/DL (ref 31–37)
MCV RBC AUTO: 97.9 FL (ref 80–100)
MONOCYTES # BLD: 6 % (ref 1–7)
MYOGLOBIN: 60 NG/ML (ref 25–58)
MYOGLOBIN: 64 NG/ML (ref 25–58)
NRBC AUTOMATED: ABNORMAL PER 100 WBC
PDW BLD-RTO: 16.7 % (ref 11.5–14.5)
PLATELET # BLD: 107 K/UL (ref 130–400)
PLATELET ESTIMATE: ABNORMAL
PMV BLD AUTO: ABNORMAL FL (ref 6–12)
POTASSIUM SERPL-SCNC: 3.9 MMOL/L (ref 3.7–5.3)
PRO-BNP: 418 PG/ML
RBC # BLD: 2.71 M/UL (ref 4–5.2)
RBC # BLD: ABNORMAL 10*6/UL
SEG NEUTROPHILS: 85 % (ref 36–66)
SEGMENTED NEUTROPHILS ABSOLUTE COUNT: 8.1 K/UL (ref 1.8–7.7)
SODIUM BLD-SCNC: 143 MMOL/L (ref 135–144)
TROPONIN INTERP: ABNORMAL
TROPONIN INTERP: ABNORMAL
TROPONIN T: <0.03 NG/ML
TROPONIN T: <0.03 NG/ML
WBC # BLD: 9.5 K/UL (ref 3.5–11)
WBC # BLD: ABNORMAL 10*3/UL

## 2018-05-31 PROCEDURE — 99285 EMERGENCY DEPT VISIT HI MDM: CPT

## 2018-05-31 PROCEDURE — 2700000000 HC OXYGEN THERAPY PER DAY

## 2018-05-31 PROCEDURE — 80048 BASIC METABOLIC PNL TOTAL CA: CPT

## 2018-05-31 PROCEDURE — 6370000000 HC RX 637 (ALT 250 FOR IP): Performed by: INTERNAL MEDICINE

## 2018-05-31 PROCEDURE — 2580000003 HC RX 258: Performed by: NURSE PRACTITIONER

## 2018-05-31 PROCEDURE — 94640 AIRWAY INHALATION TREATMENT: CPT

## 2018-05-31 PROCEDURE — 71045 X-RAY EXAM CHEST 1 VIEW: CPT

## 2018-05-31 PROCEDURE — 6370000000 HC RX 637 (ALT 250 FOR IP): Performed by: NURSE PRACTITIONER

## 2018-05-31 PROCEDURE — 83874 ASSAY OF MYOGLOBIN: CPT

## 2018-05-31 PROCEDURE — 6360000002 HC RX W HCPCS: Performed by: NURSE PRACTITIONER

## 2018-05-31 PROCEDURE — 94660 CPAP INITIATION&MGMT: CPT

## 2018-05-31 PROCEDURE — 83880 ASSAY OF NATRIURETIC PEPTIDE: CPT

## 2018-05-31 PROCEDURE — 94761 N-INVAS EAR/PLS OXIMETRY MLT: CPT

## 2018-05-31 PROCEDURE — 6360000002 HC RX W HCPCS: Performed by: EMERGENCY MEDICINE

## 2018-05-31 PROCEDURE — 93005 ELECTROCARDIOGRAM TRACING: CPT

## 2018-05-31 PROCEDURE — 96375 TX/PRO/DX INJ NEW DRUG ADDON: CPT

## 2018-05-31 PROCEDURE — 87086 URINE CULTURE/COLONY COUNT: CPT

## 2018-05-31 PROCEDURE — 84484 ASSAY OF TROPONIN QUANT: CPT

## 2018-05-31 PROCEDURE — 82947 ASSAY GLUCOSE BLOOD QUANT: CPT

## 2018-05-31 PROCEDURE — 85025 COMPLETE CBC W/AUTO DIFF WBC: CPT

## 2018-05-31 PROCEDURE — 36415 COLL VENOUS BLD VENIPUNCTURE: CPT

## 2018-05-31 PROCEDURE — 99223 1ST HOSP IP/OBS HIGH 75: CPT | Performed by: INTERNAL MEDICINE

## 2018-05-31 PROCEDURE — 2000000000 HC ICU R&B

## 2018-05-31 PROCEDURE — 6370000000 HC RX 637 (ALT 250 FOR IP): Performed by: EMERGENCY MEDICINE

## 2018-05-31 PROCEDURE — 96374 THER/PROPH/DIAG INJ IV PUSH: CPT

## 2018-05-31 PROCEDURE — 94664 DEMO&/EVAL PT USE INHALER: CPT

## 2018-05-31 PROCEDURE — 51702 INSERT TEMP BLADDER CATH: CPT

## 2018-05-31 PROCEDURE — 6370000000 HC RX 637 (ALT 250 FOR IP): Performed by: FAMILY MEDICINE

## 2018-05-31 PROCEDURE — 6360000002 HC RX W HCPCS: Performed by: INTERNAL MEDICINE

## 2018-05-31 RX ORDER — ASPIRIN 81 MG/1
81 TABLET ORAL DAILY
Status: DISCONTINUED | OUTPATIENT
Start: 2018-05-31 | End: 2018-06-07 | Stop reason: HOSPADM

## 2018-05-31 RX ORDER — DEXTROSE MONOHYDRATE 50 MG/ML
100 INJECTION, SOLUTION INTRAVENOUS PRN
Status: DISCONTINUED | OUTPATIENT
Start: 2018-05-31 | End: 2018-06-07 | Stop reason: HOSPADM

## 2018-05-31 RX ORDER — CALCIUM CARBONATE 200(500)MG
500 TABLET,CHEWABLE ORAL 3 TIMES DAILY
Status: DISCONTINUED | OUTPATIENT
Start: 2018-05-31 | End: 2018-06-07 | Stop reason: HOSPADM

## 2018-05-31 RX ORDER — SENNA PLUS 8.6 MG/1
1-2 TABLET ORAL NIGHTLY
COMMUNITY

## 2018-05-31 RX ORDER — SODIUM CHLORIDE 0.9 % (FLUSH) 0.9 %
10 SYRINGE (ML) INJECTION EVERY 12 HOURS SCHEDULED
Status: DISCONTINUED | OUTPATIENT
Start: 2018-05-31 | End: 2018-06-07 | Stop reason: HOSPADM

## 2018-05-31 RX ORDER — FUROSEMIDE 20 MG/1
20 TABLET ORAL DAILY
Status: DISCONTINUED | OUTPATIENT
Start: 2018-05-31 | End: 2018-05-31

## 2018-05-31 RX ORDER — PANTOPRAZOLE SODIUM 40 MG/1
40 TABLET, DELAYED RELEASE ORAL
Status: DISCONTINUED | OUTPATIENT
Start: 2018-05-31 | End: 2018-06-07 | Stop reason: HOSPADM

## 2018-05-31 RX ORDER — ALBUTEROL SULFATE 90 UG/1
2 AEROSOL, METERED RESPIRATORY (INHALATION)
Status: DISCONTINUED | OUTPATIENT
Start: 2018-05-31 | End: 2018-05-31

## 2018-05-31 RX ORDER — FUROSEMIDE 10 MG/ML
40 INJECTION INTRAMUSCULAR; INTRAVENOUS ONCE
Status: COMPLETED | OUTPATIENT
Start: 2018-05-31 | End: 2018-05-31

## 2018-05-31 RX ORDER — IPRATROPIUM BROMIDE AND ALBUTEROL SULFATE 2.5; .5 MG/3ML; MG/3ML
1 SOLUTION RESPIRATORY (INHALATION)
Status: DISCONTINUED | OUTPATIENT
Start: 2018-05-31 | End: 2018-05-31

## 2018-05-31 RX ORDER — CARBIDOPA AND LEVODOPA 25; 100 MG/1; MG/1
1 TABLET, EXTENDED RELEASE ORAL 4 TIMES DAILY
COMMUNITY
End: 2019-04-09 | Stop reason: SDUPTHER

## 2018-05-31 RX ORDER — ALBUTEROL SULFATE 2.5 MG/3ML
5 SOLUTION RESPIRATORY (INHALATION)
Status: DISCONTINUED | OUTPATIENT
Start: 2018-05-31 | End: 2018-05-31

## 2018-05-31 RX ORDER — METHYLPREDNISOLONE SODIUM SUCCINATE 125 MG/2ML
60 INJECTION, POWDER, LYOPHILIZED, FOR SOLUTION INTRAMUSCULAR; INTRAVENOUS EVERY 8 HOURS
Status: DISCONTINUED | OUTPATIENT
Start: 2018-05-31 | End: 2018-06-04

## 2018-05-31 RX ORDER — ALBUTEROL SULFATE 2.5 MG/3ML
2.5 SOLUTION RESPIRATORY (INHALATION)
Status: DISCONTINUED | OUTPATIENT
Start: 2018-05-31 | End: 2018-06-07 | Stop reason: HOSPADM

## 2018-05-31 RX ORDER — HYDROCODONE BITARTRATE AND ACETAMINOPHEN 5; 325 MG/1; MG/1
2 TABLET ORAL EVERY 4 HOURS PRN
Status: DISCONTINUED | OUTPATIENT
Start: 2018-05-31 | End: 2018-06-07 | Stop reason: HOSPADM

## 2018-05-31 RX ORDER — NICOTINE POLACRILEX 4 MG
15 LOZENGE BUCCAL PRN
Status: DISCONTINUED | OUTPATIENT
Start: 2018-05-31 | End: 2018-06-07 | Stop reason: HOSPADM

## 2018-05-31 RX ORDER — ASPIRIN 81 MG/1
324 TABLET, CHEWABLE ORAL ONCE
Status: COMPLETED | OUTPATIENT
Start: 2018-05-31 | End: 2018-05-31

## 2018-05-31 RX ORDER — NICOTINE 21 MG/24HR
1 PATCH, TRANSDERMAL 24 HOURS TRANSDERMAL DAILY PRN
Status: DISCONTINUED | OUTPATIENT
Start: 2018-05-31 | End: 2018-05-31

## 2018-05-31 RX ORDER — DEXTROSE MONOHYDRATE 25 G/50ML
12.5 INJECTION, SOLUTION INTRAVENOUS PRN
Status: DISCONTINUED | OUTPATIENT
Start: 2018-05-31 | End: 2018-06-07 | Stop reason: HOSPADM

## 2018-05-31 RX ORDER — MAGNESIUM OXIDE 400 MG/1
400 TABLET ORAL DAILY
Status: ON HOLD | COMMUNITY
End: 2018-09-24

## 2018-05-31 RX ORDER — FUROSEMIDE 40 MG/1
40 TABLET ORAL DAILY
Status: ON HOLD | COMMUNITY
End: 2018-06-07 | Stop reason: HOSPADM

## 2018-05-31 RX ORDER — LANOLIN ALCOHOL/MO/W.PET/CERES
325 CREAM (GRAM) TOPICAL 2 TIMES DAILY WITH MEALS
Status: DISCONTINUED | OUTPATIENT
Start: 2018-05-31 | End: 2018-06-07 | Stop reason: HOSPADM

## 2018-05-31 RX ORDER — AMIODARONE HYDROCHLORIDE 200 MG/1
200 TABLET ORAL DAILY
Status: DISCONTINUED | OUTPATIENT
Start: 2018-05-31 | End: 2018-06-07 | Stop reason: HOSPADM

## 2018-05-31 RX ORDER — ROPINIROLE 2 MG/1
2 TABLET, FILM COATED ORAL 3 TIMES DAILY
Status: DISCONTINUED | OUTPATIENT
Start: 2018-05-31 | End: 2018-06-07 | Stop reason: HOSPADM

## 2018-05-31 RX ORDER — FUROSEMIDE 10 MG/ML
20 INJECTION INTRAMUSCULAR; INTRAVENOUS ONCE
Status: COMPLETED | OUTPATIENT
Start: 2018-05-31 | End: 2018-05-31

## 2018-05-31 RX ORDER — ONDANSETRON 2 MG/ML
4 INJECTION INTRAMUSCULAR; INTRAVENOUS EVERY 6 HOURS PRN
Status: DISCONTINUED | OUTPATIENT
Start: 2018-05-31 | End: 2018-06-07 | Stop reason: HOSPADM

## 2018-05-31 RX ORDER — BISACODYL 10 MG
10 SUPPOSITORY, RECTAL RECTAL DAILY PRN
Status: DISCONTINUED | OUTPATIENT
Start: 2018-05-31 | End: 2018-06-07 | Stop reason: HOSPADM

## 2018-05-31 RX ORDER — ONDANSETRON 2 MG/ML
4 INJECTION INTRAMUSCULAR; INTRAVENOUS EVERY 6 HOURS PRN
Status: DISCONTINUED | OUTPATIENT
Start: 2018-05-31 | End: 2018-05-31

## 2018-05-31 RX ORDER — ONDANSETRON 4 MG/1
4 TABLET, ORALLY DISINTEGRATING ORAL EVERY 6 HOURS PRN
Status: DISCONTINUED | OUTPATIENT
Start: 2018-05-31 | End: 2018-06-07 | Stop reason: HOSPADM

## 2018-05-31 RX ORDER — POLYETHYLENE GLYCOL 3350 17 G/17G
17 POWDER, FOR SOLUTION ORAL DAILY PRN
Status: ON HOLD | COMMUNITY
End: 2018-09-24

## 2018-05-31 RX ORDER — METHYLPREDNISOLONE SODIUM SUCCINATE 125 MG/2ML
125 INJECTION, POWDER, LYOPHILIZED, FOR SOLUTION INTRAMUSCULAR; INTRAVENOUS ONCE
Status: COMPLETED | OUTPATIENT
Start: 2018-05-31 | End: 2018-05-31

## 2018-05-31 RX ORDER — ATORVASTATIN CALCIUM 20 MG/1
20 TABLET, FILM COATED ORAL DAILY
Status: DISCONTINUED | OUTPATIENT
Start: 2018-05-31 | End: 2018-06-07 | Stop reason: HOSPADM

## 2018-05-31 RX ORDER — CALCITRIOL 0.25 UG/1
0.25 CAPSULE, LIQUID FILLED ORAL 3 TIMES DAILY
Status: DISCONTINUED | OUTPATIENT
Start: 2018-05-31 | End: 2018-06-07 | Stop reason: HOSPADM

## 2018-05-31 RX ORDER — SODIUM CHLORIDE 0.9 % (FLUSH) 0.9 %
10 SYRINGE (ML) INJECTION PRN
Status: DISCONTINUED | OUTPATIENT
Start: 2018-05-31 | End: 2018-06-07 | Stop reason: HOSPADM

## 2018-05-31 RX ORDER — HYDROCODONE BITARTRATE AND ACETAMINOPHEN 5; 325 MG/1; MG/1
1 TABLET ORAL EVERY 4 HOURS PRN
Status: DISCONTINUED | OUTPATIENT
Start: 2018-05-31 | End: 2018-06-07 | Stop reason: HOSPADM

## 2018-05-31 RX ORDER — AZITHROMYCIN 250 MG/1
250 TABLET, FILM COATED ORAL DAILY
Status: DISCONTINUED | OUTPATIENT
Start: 2018-05-31 | End: 2018-06-07

## 2018-05-31 RX ORDER — IPRATROPIUM BROMIDE AND ALBUTEROL SULFATE 2.5; .5 MG/3ML; MG/3ML
1 SOLUTION RESPIRATORY (INHALATION)
Status: DISCONTINUED | OUTPATIENT
Start: 2018-05-31 | End: 2018-06-03

## 2018-05-31 RX ORDER — LANOLIN ALCOHOL/MO/W.PET/CERES
1000 CREAM (GRAM) TOPICAL DAILY
Status: DISCONTINUED | OUTPATIENT
Start: 2018-05-31 | End: 2018-06-07 | Stop reason: HOSPADM

## 2018-05-31 RX ORDER — RASAGILINE 0.5 MG/1
1 TABLET ORAL DAILY
Status: DISCONTINUED | OUTPATIENT
Start: 2018-05-31 | End: 2018-06-07 | Stop reason: HOSPADM

## 2018-05-31 RX ORDER — METHYLPREDNISOLONE SODIUM SUCCINATE 125 MG/2ML
80 INJECTION, POWDER, LYOPHILIZED, FOR SOLUTION INTRAMUSCULAR; INTRAVENOUS EVERY 8 HOURS
Status: DISCONTINUED | OUTPATIENT
Start: 2018-05-31 | End: 2018-05-31

## 2018-05-31 RX ADMIN — METHYLPREDNISOLONE SODIUM SUCCINATE 60 MG: 125 INJECTION, POWDER, FOR SOLUTION INTRAMUSCULAR; INTRAVENOUS at 19:46

## 2018-05-31 RX ADMIN — FUROSEMIDE 40 MG: 10 INJECTION, SOLUTION INTRAMUSCULAR; INTRAVENOUS at 03:55

## 2018-05-31 RX ADMIN — CARBIDOPA AND LEVODOPA 1 TABLET: 25; 100 TABLET ORAL at 17:51

## 2018-05-31 RX ADMIN — CHOLECALCIFEROL CAP 125 MCG (5000 UNIT) 5000 UNITS: 125 CAP at 09:20

## 2018-05-31 RX ADMIN — CALCITRIOL 0.25 MCG: 0.25 CAPSULE, LIQUID FILLED ORAL at 09:24

## 2018-05-31 RX ADMIN — PANTOPRAZOLE SODIUM 40 MG: 40 TABLET, DELAYED RELEASE ORAL at 17:30

## 2018-05-31 RX ADMIN — AZITHROMYCIN MONOHYDRATE 250 MG: 250 TABLET ORAL at 13:15

## 2018-05-31 RX ADMIN — Medication 10 ML: at 09:42

## 2018-05-31 RX ADMIN — FUROSEMIDE 20 MG: 20 TABLET ORAL at 09:24

## 2018-05-31 RX ADMIN — CALCITRIOL 0.25 MCG: 0.25 CAPSULE, LIQUID FILLED ORAL at 14:51

## 2018-05-31 RX ADMIN — ROPINIROLE HYDROCHLORIDE 2 MG: 2 TABLET, FILM COATED ORAL at 20:31

## 2018-05-31 RX ADMIN — RASAGILINE MESYLATE 1 MG: 0.5 TABLET ORAL at 10:00

## 2018-05-31 RX ADMIN — Medication 10 ML: at 20:35

## 2018-05-31 RX ADMIN — IPRATROPIUM BROMIDE AND ALBUTEROL SULFATE 1 AMPULE: .5; 3 SOLUTION RESPIRATORY (INHALATION) at 20:05

## 2018-05-31 RX ADMIN — METOPROLOL TARTRATE 12.5 MG: 25 TABLET, FILM COATED ORAL at 09:31

## 2018-05-31 RX ADMIN — ALBUTEROL SULFATE 5 MG: 5 SOLUTION RESPIRATORY (INHALATION) at 03:21

## 2018-05-31 RX ADMIN — CALCITRIOL 0.25 MCG: 0.25 CAPSULE, LIQUID FILLED ORAL at 20:31

## 2018-05-31 RX ADMIN — CARBIDOPA AND LEVODOPA 1 TABLET: 25; 100 TABLET ORAL at 20:31

## 2018-05-31 RX ADMIN — INSULIN LISPRO 1 UNITS: 100 INJECTION, SOLUTION INTRAVENOUS; SUBCUTANEOUS at 17:49

## 2018-05-31 RX ADMIN — HYDROCODONE BITARTRATE AND ACETAMINOPHEN 2 TABLET: 5; 325 TABLET ORAL at 18:16

## 2018-05-31 RX ADMIN — METOPROLOL TARTRATE 12.5 MG: 25 TABLET, FILM COATED ORAL at 20:31

## 2018-05-31 RX ADMIN — ATORVASTATIN CALCIUM 20 MG: 20 TABLET, FILM COATED ORAL at 09:24

## 2018-05-31 RX ADMIN — ENOXAPARIN SODIUM 40 MG: 40 INJECTION SUBCUTANEOUS at 09:25

## 2018-05-31 RX ADMIN — INSULIN LISPRO 3 UNITS: 100 INJECTION, SOLUTION INTRAVENOUS; SUBCUTANEOUS at 12:05

## 2018-05-31 RX ADMIN — ANTACID TABLETS 500 MG: 500 TABLET, CHEWABLE ORAL at 09:24

## 2018-05-31 RX ADMIN — ASPIRIN 81 MG 324 MG: 81 TABLET ORAL at 05:00

## 2018-05-31 RX ADMIN — FUROSEMIDE 20 MG: 10 INJECTION, SOLUTION INTRAMUSCULAR; INTRAVENOUS at 12:05

## 2018-05-31 RX ADMIN — FERROUS SULFATE TAB EC 325 MG (65 MG FE EQUIVALENT) 325 MG: 325 (65 FE) TABLET DELAYED RESPONSE at 17:50

## 2018-05-31 RX ADMIN — CYANOCOBALAMIN TAB 1000 MCG 1000 MCG: 1000 TAB at 09:31

## 2018-05-31 RX ADMIN — METHYLPREDNISOLONE SODIUM SUCCINATE 125 MG: 125 INJECTION, POWDER, FOR SOLUTION INTRAMUSCULAR; INTRAVENOUS at 03:55

## 2018-05-31 RX ADMIN — HYDROCODONE BITARTRATE AND ACETAMINOPHEN 2 TABLET: 5; 325 TABLET ORAL at 22:15

## 2018-05-31 RX ADMIN — ANTACID TABLETS 500 MG: 500 TABLET, CHEWABLE ORAL at 14:51

## 2018-05-31 RX ADMIN — LINAGLIPTIN 5 MG: 5 TABLET, FILM COATED ORAL at 09:31

## 2018-05-31 RX ADMIN — IPRATROPIUM BROMIDE AND ALBUTEROL SULFATE 1 AMPULE: .5; 3 SOLUTION RESPIRATORY (INHALATION) at 15:19

## 2018-05-31 RX ADMIN — METFORMIN HYDROCHLORIDE 1000 MG: 500 TABLET, FILM COATED ORAL at 09:39

## 2018-05-31 RX ADMIN — IPRATROPIUM BROMIDE AND ALBUTEROL SULFATE 1 AMPULE: .5; 3 SOLUTION RESPIRATORY (INHALATION) at 11:14

## 2018-05-31 RX ADMIN — Medication 2 PUFF: at 08:07

## 2018-05-31 RX ADMIN — INSULIN LISPRO 1 UNITS: 100 INJECTION, SOLUTION INTRAVENOUS; SUBCUTANEOUS at 21:08

## 2018-05-31 RX ADMIN — CARBIDOPA AND LEVODOPA 1 TABLET: 25; 100 TABLET ORAL at 15:06

## 2018-05-31 RX ADMIN — ROPINIROLE HYDROCHLORIDE 2 MG: 2 TABLET, FILM COATED ORAL at 09:21

## 2018-05-31 RX ADMIN — CARBIDOPA AND LEVODOPA 1 TABLET: 25; 100 TABLET ORAL at 09:20

## 2018-05-31 RX ADMIN — AMIODARONE HYDROCHLORIDE 200 MG: 200 TABLET ORAL at 09:24

## 2018-05-31 RX ADMIN — FERROUS SULFATE TAB EC 325 MG (65 MG FE EQUIVALENT) 325 MG: 325 (65 FE) TABLET DELAYED RESPONSE at 09:40

## 2018-05-31 RX ADMIN — IPRATROPIUM BROMIDE AND ALBUTEROL SULFATE 1 AMPULE: .5; 3 SOLUTION RESPIRATORY (INHALATION) at 08:07

## 2018-05-31 RX ADMIN — PANTOPRAZOLE SODIUM 40 MG: 40 TABLET, DELAYED RELEASE ORAL at 09:39

## 2018-05-31 RX ADMIN — METHYLPREDNISOLONE SODIUM SUCCINATE 80 MG: 125 INJECTION, POWDER, FOR SOLUTION INTRAMUSCULAR; INTRAVENOUS at 12:05

## 2018-05-31 RX ADMIN — HYDROCODONE BITARTRATE AND ACETAMINOPHEN 2 TABLET: 5; 325 TABLET ORAL at 09:35

## 2018-05-31 ASSESSMENT — ENCOUNTER SYMPTOMS
VOMITING: 1
VOMITING: 0
SORE THROAT: 0
BACK PAIN: 0
EYE PAIN: 0
NAUSEA: 1
BLURRED VISION: 0
COUGH: 1
ABDOMINAL PAIN: 0
BLOOD IN STOOL: 0
SPUTUM PRODUCTION: 0
DIARRHEA: 0
HEMOPTYSIS: 0
COUGH: 0
WHEEZING: 1
NAUSEA: 0
SHORTNESS OF BREATH: 1

## 2018-05-31 ASSESSMENT — PAIN SCALES - GENERAL
PAINLEVEL_OUTOF10: 10
PAINLEVEL_OUTOF10: 8
PAINLEVEL_OUTOF10: 0
PAINLEVEL_OUTOF10: 7

## 2018-05-31 ASSESSMENT — PAIN DESCRIPTION - ORIENTATION: ORIENTATION: RIGHT;LEFT

## 2018-05-31 ASSESSMENT — PAIN DESCRIPTION - PAIN TYPE: TYPE: CHRONIC PAIN

## 2018-05-31 ASSESSMENT — PAIN DESCRIPTION - LOCATION: LOCATION: LEG

## 2018-06-01 ENCOUNTER — APPOINTMENT (OUTPATIENT)
Dept: GENERAL RADIOLOGY | Age: 73
DRG: 190 | End: 2018-06-01
Payer: COMMERCIAL

## 2018-06-01 LAB
ABSOLUTE EOS #: 0 K/UL (ref 0–0.4)
ABSOLUTE IMMATURE GRANULOCYTE: ABNORMAL K/UL (ref 0–0.3)
ABSOLUTE LYMPH #: 0.6 K/UL (ref 1–4.8)
ABSOLUTE MONO #: 0.24 K/UL (ref 0.2–0.8)
ANION GAP SERPL CALCULATED.3IONS-SCNC: 19 MMOL/L (ref 9–17)
BASOPHILS # BLD: 0 %
BASOPHILS ABSOLUTE: 0 K/UL (ref 0–0.2)
BNP INTERPRETATION: ABNORMAL
BUN BLDV-MCNC: 44 MG/DL (ref 8–23)
BUN/CREAT BLD: 22 (ref 9–20)
CALCIUM IONIZED: 1.01 MMOL/L (ref 1.13–1.33)
CALCIUM SERPL-MCNC: 7.8 MG/DL (ref 8.6–10.4)
CHLORIDE BLD-SCNC: 93 MMOL/L (ref 98–107)
CO2: 30 MMOL/L (ref 20–31)
CREAT SERPL-MCNC: 1.99 MG/DL (ref 0.5–0.9)
CULTURE: NO GROWTH
CULTURE: NORMAL
DIFFERENTIAL TYPE: ABNORMAL
EOSINOPHILS RELATIVE PERCENT: 0 % (ref 1–4)
GFR AFRICAN AMERICAN: 30 ML/MIN
GFR NON-AFRICAN AMERICAN: 25 ML/MIN
GFR SERPL CREATININE-BSD FRML MDRD: ABNORMAL ML/MIN/{1.73_M2}
GFR SERPL CREATININE-BSD FRML MDRD: ABNORMAL ML/MIN/{1.73_M2}
GLUCOSE BLD-MCNC: 153 MG/DL (ref 65–105)
GLUCOSE BLD-MCNC: 167 MG/DL (ref 65–105)
GLUCOSE BLD-MCNC: 201 MG/DL (ref 65–105)
GLUCOSE BLD-MCNC: 230 MG/DL (ref 65–105)
GLUCOSE BLD-MCNC: 248 MG/DL (ref 70–99)
HCT VFR BLD CALC: 26 % (ref 36–46)
HEMOGLOBIN: 8.4 G/DL (ref 12–16)
IMMATURE GRANULOCYTES: ABNORMAL %
LYMPHOCYTES # BLD: 5 % (ref 24–44)
Lab: NORMAL
MAGNESIUM: 1.7 MG/DL (ref 1.6–2.6)
MCH RBC QN AUTO: 31.9 PG (ref 26–34)
MCHC RBC AUTO-ENTMCNC: 32.4 G/DL (ref 31–37)
MCV RBC AUTO: 98.4 FL (ref 80–100)
MONOCYTES # BLD: 2 % (ref 1–7)
MORPHOLOGY: ABNORMAL
NRBC AUTOMATED: ABNORMAL PER 100 WBC
PDW BLD-RTO: 16.6 % (ref 11.5–14.5)
PLATELET # BLD: 119 K/UL (ref 130–400)
PLATELET ESTIMATE: ABNORMAL
PMV BLD AUTO: ABNORMAL FL (ref 6–12)
POTASSIUM SERPL-SCNC: 3.5 MMOL/L (ref 3.7–5.3)
PRO-BNP: 1872 PG/ML
RBC # BLD: 2.64 M/UL (ref 4–5.2)
RBC # BLD: ABNORMAL 10*6/UL
SEG NEUTROPHILS: 93 % (ref 36–66)
SEGMENTED NEUTROPHILS ABSOLUTE COUNT: 11.16 K/UL (ref 1.8–7.7)
SODIUM BLD-SCNC: 142 MMOL/L (ref 135–144)
SPECIMEN DESCRIPTION: NORMAL
STATUS: NORMAL
WBC # BLD: 12 K/UL (ref 3.5–11)
WBC # BLD: ABNORMAL 10*3/UL

## 2018-06-01 PROCEDURE — 94640 AIRWAY INHALATION TREATMENT: CPT

## 2018-06-01 PROCEDURE — 71045 X-RAY EXAM CHEST 1 VIEW: CPT

## 2018-06-01 PROCEDURE — 82947 ASSAY GLUCOSE BLOOD QUANT: CPT

## 2018-06-01 PROCEDURE — 6370000000 HC RX 637 (ALT 250 FOR IP): Performed by: INTERNAL MEDICINE

## 2018-06-01 PROCEDURE — 6360000002 HC RX W HCPCS: Performed by: NURSE PRACTITIONER

## 2018-06-01 PROCEDURE — 83880 ASSAY OF NATRIURETIC PEPTIDE: CPT

## 2018-06-01 PROCEDURE — 6370000000 HC RX 637 (ALT 250 FOR IP): Performed by: FAMILY MEDICINE

## 2018-06-01 PROCEDURE — 36415 COLL VENOUS BLD VENIPUNCTURE: CPT

## 2018-06-01 PROCEDURE — 94660 CPAP INITIATION&MGMT: CPT

## 2018-06-01 PROCEDURE — 2700000000 HC OXYGEN THERAPY PER DAY

## 2018-06-01 PROCEDURE — 85025 COMPLETE CBC W/AUTO DIFF WBC: CPT

## 2018-06-01 PROCEDURE — 82330 ASSAY OF CALCIUM: CPT

## 2018-06-01 PROCEDURE — 80048 BASIC METABOLIC PNL TOTAL CA: CPT

## 2018-06-01 PROCEDURE — 6370000000 HC RX 637 (ALT 250 FOR IP): Performed by: NURSE PRACTITIONER

## 2018-06-01 PROCEDURE — 2580000003 HC RX 258: Performed by: NURSE PRACTITIONER

## 2018-06-01 PROCEDURE — 2580000003 HC RX 258: Performed by: FAMILY MEDICINE

## 2018-06-01 PROCEDURE — 94761 N-INVAS EAR/PLS OXIMETRY MLT: CPT

## 2018-06-01 PROCEDURE — 83735 ASSAY OF MAGNESIUM: CPT

## 2018-06-01 PROCEDURE — 6360000002 HC RX W HCPCS: Performed by: FAMILY MEDICINE

## 2018-06-01 PROCEDURE — 99232 SBSQ HOSP IP/OBS MODERATE 35: CPT | Performed by: FAMILY MEDICINE

## 2018-06-01 PROCEDURE — 2060000000 HC ICU INTERMEDIATE R&B

## 2018-06-01 RX ORDER — CARBIDOPA AND LEVODOPA 25; 100 MG/1; MG/1
1 TABLET, EXTENDED RELEASE ORAL 4 TIMES DAILY
Status: DISCONTINUED | OUTPATIENT
Start: 2018-06-01 | End: 2018-06-07 | Stop reason: HOSPADM

## 2018-06-01 RX ORDER — FUROSEMIDE 10 MG/ML
20 INJECTION INTRAMUSCULAR; INTRAVENOUS ONCE
Status: DISCONTINUED | OUTPATIENT
Start: 2018-06-01 | End: 2018-06-01

## 2018-06-01 RX ORDER — POLYETHYLENE GLYCOL 3350 17 G/17G
17 POWDER, FOR SOLUTION ORAL DAILY PRN
Status: DISCONTINUED | OUTPATIENT
Start: 2018-06-01 | End: 2018-06-07 | Stop reason: HOSPADM

## 2018-06-01 RX ORDER — POTASSIUM CHLORIDE 20 MEQ/1
40 TABLET, EXTENDED RELEASE ORAL ONCE
Status: COMPLETED | OUTPATIENT
Start: 2018-06-01 | End: 2018-06-01

## 2018-06-01 RX ORDER — SENNA PLUS 8.6 MG/1
2 TABLET ORAL DAILY
Status: DISCONTINUED | OUTPATIENT
Start: 2018-06-02 | End: 2018-06-07 | Stop reason: HOSPADM

## 2018-06-01 RX ORDER — SODIUM CHLORIDE 9 MG/ML
INJECTION, SOLUTION INTRAVENOUS CONTINUOUS
Status: DISCONTINUED | OUTPATIENT
Start: 2018-06-01 | End: 2018-06-03

## 2018-06-01 RX ORDER — HEPARIN SODIUM 5000 [USP'U]/ML
5000 INJECTION, SOLUTION INTRAVENOUS; SUBCUTANEOUS EVERY 8 HOURS SCHEDULED
Status: DISCONTINUED | OUTPATIENT
Start: 2018-06-02 | End: 2018-06-07 | Stop reason: HOSPADM

## 2018-06-01 RX ADMIN — FERROUS SULFATE TAB EC 325 MG (65 MG FE EQUIVALENT) 325 MG: 325 (65 FE) TABLET DELAYED RESPONSE at 09:26

## 2018-06-01 RX ADMIN — ROPINIROLE HYDROCHLORIDE 2 MG: 2 TABLET, FILM COATED ORAL at 21:21

## 2018-06-01 RX ADMIN — HYDROCODONE BITARTRATE AND ACETAMINOPHEN 2 TABLET: 5; 325 TABLET ORAL at 21:20

## 2018-06-01 RX ADMIN — INSULIN LISPRO 2 UNITS: 100 INJECTION, SOLUTION INTRAVENOUS; SUBCUTANEOUS at 16:46

## 2018-06-01 RX ADMIN — CALCIUM GLUCONATE 1 G: 98 INJECTION, SOLUTION INTRAVENOUS at 16:26

## 2018-06-01 RX ADMIN — INSULIN LISPRO 1 UNITS: 100 INJECTION, SOLUTION INTRAVENOUS; SUBCUTANEOUS at 12:40

## 2018-06-01 RX ADMIN — CARBIDOPA AND LEVODOPA 1 TABLET: 25; 100 TABLET ORAL at 17:11

## 2018-06-01 RX ADMIN — CYANOCOBALAMIN TAB 1000 MCG 1000 MCG: 1000 TAB at 09:18

## 2018-06-01 RX ADMIN — HYDROCODONE BITARTRATE AND ACETAMINOPHEN 2 TABLET: 5; 325 TABLET ORAL at 12:31

## 2018-06-01 RX ADMIN — ANTACID TABLETS 500 MG: 500 TABLET, CHEWABLE ORAL at 09:17

## 2018-06-01 RX ADMIN — AZITHROMYCIN MONOHYDRATE 250 MG: 250 TABLET ORAL at 09:18

## 2018-06-01 RX ADMIN — IPRATROPIUM BROMIDE AND ALBUTEROL SULFATE 1 AMPULE: .5; 3 SOLUTION RESPIRATORY (INHALATION) at 19:18

## 2018-06-01 RX ADMIN — Medication 10 ML: at 09:30

## 2018-06-01 RX ADMIN — METHYLPREDNISOLONE SODIUM SUCCINATE 60 MG: 125 INJECTION, POWDER, FOR SOLUTION INTRAMUSCULAR; INTRAVENOUS at 03:52

## 2018-06-01 RX ADMIN — METHYLPREDNISOLONE SODIUM SUCCINATE 60 MG: 125 INJECTION, POWDER, FOR SOLUTION INTRAMUSCULAR; INTRAVENOUS at 12:31

## 2018-06-01 RX ADMIN — METOPROLOL TARTRATE 12.5 MG: 25 TABLET, FILM COATED ORAL at 21:21

## 2018-06-01 RX ADMIN — POTASSIUM CHLORIDE 40 MEQ: 20 TABLET, EXTENDED RELEASE ORAL at 16:37

## 2018-06-01 RX ADMIN — CALCITRIOL 0.25 MCG: 0.25 CAPSULE, LIQUID FILLED ORAL at 14:30

## 2018-06-01 RX ADMIN — METFORMIN HYDROCHLORIDE 1000 MG: 500 TABLET, FILM COATED ORAL at 09:18

## 2018-06-01 RX ADMIN — IPRATROPIUM BROMIDE AND ALBUTEROL SULFATE 1 AMPULE: .5; 3 SOLUTION RESPIRATORY (INHALATION) at 06:10

## 2018-06-01 RX ADMIN — ASPIRIN 81 MG: 81 TABLET, COATED ORAL at 09:17

## 2018-06-01 RX ADMIN — IPRATROPIUM BROMIDE AND ALBUTEROL SULFATE 1 AMPULE: .5; 3 SOLUTION RESPIRATORY (INHALATION) at 11:14

## 2018-06-01 RX ADMIN — ROPINIROLE HYDROCHLORIDE 2 MG: 2 TABLET, FILM COATED ORAL at 14:30

## 2018-06-01 RX ADMIN — IPRATROPIUM BROMIDE AND ALBUTEROL SULFATE 1 AMPULE: .5; 3 SOLUTION RESPIRATORY (INHALATION) at 16:20

## 2018-06-01 RX ADMIN — METOPROLOL TARTRATE 12.5 MG: 25 TABLET, FILM COATED ORAL at 09:17

## 2018-06-01 RX ADMIN — AMIODARONE HYDROCHLORIDE 200 MG: 200 TABLET ORAL at 09:17

## 2018-06-01 RX ADMIN — PANTOPRAZOLE SODIUM 40 MG: 40 TABLET, DELAYED RELEASE ORAL at 05:29

## 2018-06-01 RX ADMIN — ATORVASTATIN CALCIUM 20 MG: 20 TABLET, FILM COATED ORAL at 09:17

## 2018-06-01 RX ADMIN — ROPINIROLE HYDROCHLORIDE 2 MG: 2 TABLET, FILM COATED ORAL at 09:19

## 2018-06-01 RX ADMIN — CHOLECALCIFEROL CAP 125 MCG (5000 UNIT) 5000 UNITS: 125 CAP at 09:26

## 2018-06-01 RX ADMIN — CALCITRIOL 0.25 MCG: 0.25 CAPSULE, LIQUID FILLED ORAL at 09:17

## 2018-06-01 RX ADMIN — LINAGLIPTIN 5 MG: 5 TABLET, FILM COATED ORAL at 09:17

## 2018-06-01 RX ADMIN — PANTOPRAZOLE SODIUM 40 MG: 40 TABLET, DELAYED RELEASE ORAL at 17:11

## 2018-06-01 RX ADMIN — ENOXAPARIN SODIUM 30 MG: 30 INJECTION SUBCUTANEOUS at 09:18

## 2018-06-01 RX ADMIN — CARBIDOPA AND LEVODOPA 1 TABLET: 25; 100 TABLET ORAL at 12:36

## 2018-06-01 RX ADMIN — CARBIDOPA AND LEVODOPA 1 TABLET: 25; 100 TABLET ORAL at 09:26

## 2018-06-01 RX ADMIN — Medication 2 PUFF: at 06:12

## 2018-06-01 RX ADMIN — INSULIN LISPRO 2 UNITS: 100 INJECTION, SOLUTION INTRAVENOUS; SUBCUTANEOUS at 08:30

## 2018-06-01 RX ADMIN — CARBIDOPA AND LEVODOPA 1 TABLET: 25; 100 TABLET ORAL at 21:21

## 2018-06-01 RX ADMIN — CALCITRIOL 0.25 MCG: 0.25 CAPSULE, LIQUID FILLED ORAL at 21:21

## 2018-06-01 RX ADMIN — METHYLPREDNISOLONE SODIUM SUCCINATE 60 MG: 125 INJECTION, POWDER, FOR SOLUTION INTRAMUSCULAR; INTRAVENOUS at 21:29

## 2018-06-01 RX ADMIN — Medication 2 PUFF: at 19:18

## 2018-06-01 RX ADMIN — SODIUM CHLORIDE: 9 INJECTION, SOLUTION INTRAVENOUS at 16:26

## 2018-06-01 RX ADMIN — ANTACID TABLETS 500 MG: 500 TABLET, CHEWABLE ORAL at 21:20

## 2018-06-01 RX ADMIN — RASAGILINE MESYLATE 1 MG: 0.5 TABLET ORAL at 09:30

## 2018-06-01 RX ADMIN — FERROUS SULFATE TAB EC 325 MG (65 MG FE EQUIVALENT) 325 MG: 325 (65 FE) TABLET DELAYED RESPONSE at 17:11

## 2018-06-01 ASSESSMENT — PAIN SCALES - GENERAL
PAINLEVEL_OUTOF10: 0
PAINLEVEL_OUTOF10: 9
PAINLEVEL_OUTOF10: 7

## 2018-06-02 ENCOUNTER — APPOINTMENT (OUTPATIENT)
Dept: CT IMAGING | Age: 73
DRG: 190 | End: 2018-06-02
Payer: COMMERCIAL

## 2018-06-02 LAB
ABSOLUTE EOS #: 0 K/UL (ref 0–0.4)
ABSOLUTE IMMATURE GRANULOCYTE: ABNORMAL K/UL (ref 0–0.3)
ABSOLUTE LYMPH #: 0.6 K/UL (ref 1–4.8)
ABSOLUTE MONO #: 0.5 K/UL (ref 0.2–0.8)
ANION GAP SERPL CALCULATED.3IONS-SCNC: 17 MMOL/L (ref 9–17)
BASOPHILS # BLD: 0 % (ref 0–2)
BASOPHILS ABSOLUTE: 0 K/UL (ref 0–0.2)
BUN BLDV-MCNC: 55 MG/DL (ref 8–23)
BUN/CREAT BLD: 25 (ref 9–20)
CALCIUM SERPL-MCNC: 8.2 MG/DL (ref 8.6–10.4)
CHLORIDE BLD-SCNC: 94 MMOL/L (ref 98–107)
CO2: 30 MMOL/L (ref 20–31)
CREAT SERPL-MCNC: 2.21 MG/DL (ref 0.5–0.9)
DIFFERENTIAL TYPE: ABNORMAL
EOSINOPHILS RELATIVE PERCENT: 0 % (ref 1–4)
GFR AFRICAN AMERICAN: 27 ML/MIN
GFR NON-AFRICAN AMERICAN: 22 ML/MIN
GFR SERPL CREATININE-BSD FRML MDRD: ABNORMAL ML/MIN/{1.73_M2}
GFR SERPL CREATININE-BSD FRML MDRD: ABNORMAL ML/MIN/{1.73_M2}
GLUCOSE BLD-MCNC: 192 MG/DL (ref 65–105)
GLUCOSE BLD-MCNC: 219 MG/DL (ref 70–99)
GLUCOSE BLD-MCNC: 231 MG/DL (ref 65–105)
GLUCOSE BLD-MCNC: 237 MG/DL (ref 65–105)
GLUCOSE BLD-MCNC: 260 MG/DL (ref 65–105)
GLUCOSE BLD-MCNC: 288 MG/DL (ref 65–105)
HCT VFR BLD CALC: 28.6 % (ref 36–46)
HEMOGLOBIN: 9.1 G/DL (ref 12–16)
IMMATURE GRANULOCYTES: ABNORMAL %
LYMPHOCYTES # BLD: 3 % (ref 24–44)
MCH RBC QN AUTO: 31.5 PG (ref 26–34)
MCHC RBC AUTO-ENTMCNC: 31.8 G/DL (ref 31–37)
MCV RBC AUTO: 99.2 FL (ref 80–100)
MONOCYTES # BLD: 3 % (ref 1–7)
NRBC AUTOMATED: ABNORMAL PER 100 WBC
PDW BLD-RTO: 17.7 % (ref 11.5–14.5)
PLATELET # BLD: 181 K/UL (ref 130–400)
PLATELET ESTIMATE: ABNORMAL
PMV BLD AUTO: 8.7 FL (ref 6–12)
POTASSIUM SERPL-SCNC: 4.1 MMOL/L (ref 3.7–5.3)
RBC # BLD: 2.88 M/UL (ref 4–5.2)
RBC # BLD: ABNORMAL 10*6/UL
SEG NEUTROPHILS: 94 % (ref 36–66)
SEGMENTED NEUTROPHILS ABSOLUTE COUNT: 16.4 K/UL (ref 1.8–7.7)
SODIUM BLD-SCNC: 141 MMOL/L (ref 135–144)
WBC # BLD: 17.4 K/UL (ref 3.5–11)
WBC # BLD: ABNORMAL 10*3/UL

## 2018-06-02 PROCEDURE — 2700000000 HC OXYGEN THERAPY PER DAY

## 2018-06-02 PROCEDURE — G8979 MOBILITY GOAL STATUS: HCPCS

## 2018-06-02 PROCEDURE — 94760 N-INVAS EAR/PLS OXIMETRY 1: CPT

## 2018-06-02 PROCEDURE — 97530 THERAPEUTIC ACTIVITIES: CPT

## 2018-06-02 PROCEDURE — 82947 ASSAY GLUCOSE BLOOD QUANT: CPT

## 2018-06-02 PROCEDURE — 6360000002 HC RX W HCPCS: Performed by: NURSE PRACTITIONER

## 2018-06-02 PROCEDURE — 6360000002 HC RX W HCPCS: Performed by: INTERNAL MEDICINE

## 2018-06-02 PROCEDURE — 2060000000 HC ICU INTERMEDIATE R&B

## 2018-06-02 PROCEDURE — 94660 CPAP INITIATION&MGMT: CPT

## 2018-06-02 PROCEDURE — G8978 MOBILITY CURRENT STATUS: HCPCS

## 2018-06-02 PROCEDURE — 6370000000 HC RX 637 (ALT 250 FOR IP): Performed by: INTERNAL MEDICINE

## 2018-06-02 PROCEDURE — 97161 PT EVAL LOW COMPLEX 20 MIN: CPT

## 2018-06-02 PROCEDURE — 6370000000 HC RX 637 (ALT 250 FOR IP): Performed by: FAMILY MEDICINE

## 2018-06-02 PROCEDURE — 80048 BASIC METABOLIC PNL TOTAL CA: CPT

## 2018-06-02 PROCEDURE — 71250 CT THORAX DX C-: CPT

## 2018-06-02 PROCEDURE — 6370000000 HC RX 637 (ALT 250 FOR IP): Performed by: NURSE PRACTITIONER

## 2018-06-02 PROCEDURE — 97165 OT EVAL LOW COMPLEX 30 MIN: CPT

## 2018-06-02 PROCEDURE — 94640 AIRWAY INHALATION TREATMENT: CPT

## 2018-06-02 PROCEDURE — 85025 COMPLETE CBC W/AUTO DIFF WBC: CPT

## 2018-06-02 PROCEDURE — 97535 SELF CARE MNGMENT TRAINING: CPT

## 2018-06-02 PROCEDURE — 6360000002 HC RX W HCPCS: Performed by: FAMILY MEDICINE

## 2018-06-02 PROCEDURE — 99232 SBSQ HOSP IP/OBS MODERATE 35: CPT | Performed by: FAMILY MEDICINE

## 2018-06-02 PROCEDURE — 36415 COLL VENOUS BLD VENIPUNCTURE: CPT

## 2018-06-02 RX ORDER — ACETYLCYSTEINE 200 MG/ML
600 SOLUTION ORAL; RESPIRATORY (INHALATION) 2 TIMES DAILY
Status: DISCONTINUED | OUTPATIENT
Start: 2018-06-02 | End: 2018-06-05

## 2018-06-02 RX ORDER — BUDESONIDE 0.5 MG/2ML
0.5 INHALANT ORAL 2 TIMES DAILY
Status: DISCONTINUED | OUTPATIENT
Start: 2018-06-02 | End: 2018-06-07

## 2018-06-02 RX ADMIN — METHYLPREDNISOLONE SODIUM SUCCINATE 60 MG: 125 INJECTION, POWDER, FOR SOLUTION INTRAMUSCULAR; INTRAVENOUS at 12:37

## 2018-06-02 RX ADMIN — HEPARIN SODIUM 5000 UNITS: 5000 INJECTION, SOLUTION INTRAVENOUS; SUBCUTANEOUS at 05:17

## 2018-06-02 RX ADMIN — Medication 400 MG: at 09:35

## 2018-06-02 RX ADMIN — ANTACID TABLETS 500 MG: 500 TABLET, CHEWABLE ORAL at 09:33

## 2018-06-02 RX ADMIN — ANTACID TABLETS 500 MG: 500 TABLET, CHEWABLE ORAL at 12:38

## 2018-06-02 RX ADMIN — METHYLPREDNISOLONE SODIUM SUCCINATE 60 MG: 125 INJECTION, POWDER, FOR SOLUTION INTRAMUSCULAR; INTRAVENOUS at 20:35

## 2018-06-02 RX ADMIN — CALCITRIOL 0.25 MCG: 0.25 CAPSULE, LIQUID FILLED ORAL at 09:34

## 2018-06-02 RX ADMIN — AMIODARONE HYDROCHLORIDE 200 MG: 200 TABLET ORAL at 09:34

## 2018-06-02 RX ADMIN — BUDESONIDE 500 MCG: 0.5 SUSPENSION RESPIRATORY (INHALATION) at 20:01

## 2018-06-02 RX ADMIN — SENNA 17.2 MG: 8.6 TABLET, COATED ORAL at 09:33

## 2018-06-02 RX ADMIN — CARBIDOPA AND LEVODOPA 1 TABLET: 25; 100 TABLET, EXTENDED RELEASE ORAL at 12:38

## 2018-06-02 RX ADMIN — HEPARIN SODIUM 5000 UNITS: 5000 INJECTION, SOLUTION INTRAVENOUS; SUBCUTANEOUS at 22:09

## 2018-06-02 RX ADMIN — INSULIN LISPRO 2 UNITS: 100 INJECTION, SOLUTION INTRAVENOUS; SUBCUTANEOUS at 12:39

## 2018-06-02 RX ADMIN — ANTACID TABLETS 500 MG: 500 TABLET, CHEWABLE ORAL at 20:35

## 2018-06-02 RX ADMIN — ROPINIROLE HYDROCHLORIDE 2 MG: 2 TABLET, FILM COATED ORAL at 09:34

## 2018-06-02 RX ADMIN — PANTOPRAZOLE SODIUM 40 MG: 40 TABLET, DELAYED RELEASE ORAL at 17:05

## 2018-06-02 RX ADMIN — CARBIDOPA AND LEVODOPA 1 TABLET: 25; 100 TABLET, EXTENDED RELEASE ORAL at 09:33

## 2018-06-02 RX ADMIN — IPRATROPIUM BROMIDE AND ALBUTEROL SULFATE 1 AMPULE: .5; 3 SOLUTION RESPIRATORY (INHALATION) at 12:26

## 2018-06-02 RX ADMIN — IPRATROPIUM BROMIDE AND ALBUTEROL SULFATE 1 AMPULE: .5; 3 SOLUTION RESPIRATORY (INHALATION) at 06:13

## 2018-06-02 RX ADMIN — CALCITRIOL 0.25 MCG: 0.25 CAPSULE, LIQUID FILLED ORAL at 12:38

## 2018-06-02 RX ADMIN — CARBIDOPA AND LEVODOPA 1 TABLET: 25; 100 TABLET ORAL at 09:45

## 2018-06-02 RX ADMIN — INSULIN LISPRO 4 UNITS: 100 INJECTION, SOLUTION INTRAVENOUS; SUBCUTANEOUS at 17:05

## 2018-06-02 RX ADMIN — CARBIDOPA AND LEVODOPA 1 TABLET: 25; 100 TABLET ORAL at 12:38

## 2018-06-02 RX ADMIN — AZITHROMYCIN MONOHYDRATE 250 MG: 250 TABLET ORAL at 09:35

## 2018-06-02 RX ADMIN — HEPARIN SODIUM 5000 UNITS: 5000 INJECTION, SOLUTION INTRAVENOUS; SUBCUTANEOUS at 14:31

## 2018-06-02 RX ADMIN — METOPROLOL TARTRATE 12.5 MG: 25 TABLET, FILM COATED ORAL at 09:34

## 2018-06-02 RX ADMIN — PANTOPRAZOLE SODIUM 40 MG: 40 TABLET, DELAYED RELEASE ORAL at 05:18

## 2018-06-02 RX ADMIN — INSULIN LISPRO 4 UNITS: 100 INJECTION, SOLUTION INTRAVENOUS; SUBCUTANEOUS at 09:35

## 2018-06-02 RX ADMIN — Medication 2 PUFF: at 06:13

## 2018-06-02 RX ADMIN — CALCITRIOL 0.25 MCG: 0.25 CAPSULE, LIQUID FILLED ORAL at 20:35

## 2018-06-02 RX ADMIN — ASPIRIN 81 MG: 81 TABLET, COATED ORAL at 09:35

## 2018-06-02 RX ADMIN — FERROUS SULFATE TAB EC 325 MG (65 MG FE EQUIVALENT) 325 MG: 325 (65 FE) TABLET DELAYED RESPONSE at 09:33

## 2018-06-02 RX ADMIN — METOPROLOL TARTRATE 12.5 MG: 25 TABLET, FILM COATED ORAL at 22:09

## 2018-06-02 RX ADMIN — HYDROCODONE BITARTRATE AND ACETAMINOPHEN 2 TABLET: 5; 325 TABLET ORAL at 05:53

## 2018-06-02 RX ADMIN — CARBIDOPA AND LEVODOPA 1 TABLET: 25; 100 TABLET, EXTENDED RELEASE ORAL at 20:35

## 2018-06-02 RX ADMIN — IPRATROPIUM BROMIDE AND ALBUTEROL SULFATE 1 AMPULE: .5; 3 SOLUTION RESPIRATORY (INHALATION) at 20:01

## 2018-06-02 RX ADMIN — IPRATROPIUM BROMIDE AND ALBUTEROL SULFATE 1 AMPULE: .5; 3 SOLUTION RESPIRATORY (INHALATION) at 15:42

## 2018-06-02 RX ADMIN — ROPINIROLE HYDROCHLORIDE 2 MG: 2 TABLET, FILM COATED ORAL at 14:32

## 2018-06-02 RX ADMIN — LINAGLIPTIN 5 MG: 5 TABLET, FILM COATED ORAL at 09:33

## 2018-06-02 RX ADMIN — ATORVASTATIN CALCIUM 20 MG: 20 TABLET, FILM COATED ORAL at 09:33

## 2018-06-02 RX ADMIN — CYANOCOBALAMIN TAB 1000 MCG 1000 MCG: 1000 TAB at 09:33

## 2018-06-02 RX ADMIN — METHYLPREDNISOLONE SODIUM SUCCINATE 60 MG: 125 INJECTION, POWDER, FOR SOLUTION INTRAMUSCULAR; INTRAVENOUS at 05:18

## 2018-06-02 RX ADMIN — CARBIDOPA AND LEVODOPA 1 TABLET: 25; 100 TABLET, EXTENDED RELEASE ORAL at 17:05

## 2018-06-02 RX ADMIN — ALBUTEROL SULFATE 2.5 MG: 2.5 SOLUTION RESPIRATORY (INHALATION) at 02:39

## 2018-06-02 RX ADMIN — RASAGILINE MESYLATE 1 MG: 0.5 TABLET ORAL at 10:07

## 2018-06-02 RX ADMIN — HYDROCODONE BITARTRATE AND ACETAMINOPHEN 2 TABLET: 5; 325 TABLET ORAL at 22:09

## 2018-06-02 RX ADMIN — ACETYLCYSTEINE 200 MG: 200 SOLUTION ORAL; RESPIRATORY (INHALATION) at 20:01

## 2018-06-02 RX ADMIN — CHOLECALCIFEROL CAP 125 MCG (5000 UNIT) 5000 UNITS: 125 CAP at 09:33

## 2018-06-02 RX ADMIN — INSULIN LISPRO 3 UNITS: 100 INJECTION, SOLUTION INTRAVENOUS; SUBCUTANEOUS at 20:35

## 2018-06-02 RX ADMIN — FERROUS SULFATE TAB EC 325 MG (65 MG FE EQUIVALENT) 325 MG: 325 (65 FE) TABLET DELAYED RESPONSE at 17:05

## 2018-06-02 RX ADMIN — ROPINIROLE HYDROCHLORIDE 2 MG: 2 TABLET, FILM COATED ORAL at 20:35

## 2018-06-02 ASSESSMENT — PAIN SCALES - GENERAL
PAINLEVEL_OUTOF10: 10
PAINLEVEL_OUTOF10: 10
PAINLEVEL_OUTOF10: 0
PAINLEVEL_OUTOF10: 10
PAINLEVEL_OUTOF10: 0

## 2018-06-02 ASSESSMENT — PAIN DESCRIPTION - LOCATION: LOCATION: OTHER (COMMENT);LEG

## 2018-06-02 ASSESSMENT — PAIN DESCRIPTION - ORIENTATION: ORIENTATION: RIGHT;LEFT

## 2018-06-02 ASSESSMENT — PAIN DESCRIPTION - PAIN TYPE: TYPE: CHRONIC PAIN

## 2018-06-03 LAB
ANION GAP SERPL CALCULATED.3IONS-SCNC: 15 MMOL/L (ref 9–17)
BUN BLDV-MCNC: 55 MG/DL (ref 8–23)
BUN/CREAT BLD: 31 (ref 9–20)
CALCIUM SERPL-MCNC: 7.7 MG/DL (ref 8.6–10.4)
CHLORIDE BLD-SCNC: 96 MMOL/L (ref 98–107)
CO2: 29 MMOL/L (ref 20–31)
CREAT SERPL-MCNC: 1.77 MG/DL (ref 0.5–0.9)
GFR AFRICAN AMERICAN: 34 ML/MIN
GFR NON-AFRICAN AMERICAN: 28 ML/MIN
GFR SERPL CREATININE-BSD FRML MDRD: ABNORMAL ML/MIN/{1.73_M2}
GFR SERPL CREATININE-BSD FRML MDRD: ABNORMAL ML/MIN/{1.73_M2}
GLUCOSE BLD-MCNC: 193 MG/DL (ref 65–105)
GLUCOSE BLD-MCNC: 230 MG/DL (ref 65–105)
GLUCOSE BLD-MCNC: 261 MG/DL (ref 70–99)
GLUCOSE BLD-MCNC: 373 MG/DL (ref 65–105)
MAGNESIUM: 1.8 MG/DL (ref 1.6–2.6)
PHOSPHORUS: 3.4 MG/DL (ref 2.6–4.5)
POTASSIUM SERPL-SCNC: 4.1 MMOL/L (ref 3.7–5.3)
SODIUM BLD-SCNC: 140 MMOL/L (ref 135–144)

## 2018-06-03 PROCEDURE — 6360000002 HC RX W HCPCS: Performed by: FAMILY MEDICINE

## 2018-06-03 PROCEDURE — 6370000000 HC RX 637 (ALT 250 FOR IP): Performed by: INTERNAL MEDICINE

## 2018-06-03 PROCEDURE — 94660 CPAP INITIATION&MGMT: CPT

## 2018-06-03 PROCEDURE — 2700000000 HC OXYGEN THERAPY PER DAY

## 2018-06-03 PROCEDURE — 2580000003 HC RX 258: Performed by: INTERNAL MEDICINE

## 2018-06-03 PROCEDURE — 82947 ASSAY GLUCOSE BLOOD QUANT: CPT

## 2018-06-03 PROCEDURE — 2060000000 HC ICU INTERMEDIATE R&B

## 2018-06-03 PROCEDURE — 2580000003 HC RX 258: Performed by: FAMILY MEDICINE

## 2018-06-03 PROCEDURE — 6370000000 HC RX 637 (ALT 250 FOR IP): Performed by: NURSE PRACTITIONER

## 2018-06-03 PROCEDURE — 2500000003 HC RX 250 WO HCPCS: Performed by: INTERNAL MEDICINE

## 2018-06-03 PROCEDURE — 80048 BASIC METABOLIC PNL TOTAL CA: CPT

## 2018-06-03 PROCEDURE — 6360000002 HC RX W HCPCS: Performed by: NURSE PRACTITIONER

## 2018-06-03 PROCEDURE — 6370000000 HC RX 637 (ALT 250 FOR IP): Performed by: FAMILY MEDICINE

## 2018-06-03 PROCEDURE — 94640 AIRWAY INHALATION TREATMENT: CPT

## 2018-06-03 PROCEDURE — 84100 ASSAY OF PHOSPHORUS: CPT

## 2018-06-03 PROCEDURE — 94760 N-INVAS EAR/PLS OXIMETRY 1: CPT

## 2018-06-03 PROCEDURE — 36415 COLL VENOUS BLD VENIPUNCTURE: CPT

## 2018-06-03 PROCEDURE — 99232 SBSQ HOSP IP/OBS MODERATE 35: CPT | Performed by: INTERNAL MEDICINE

## 2018-06-03 PROCEDURE — 6360000002 HC RX W HCPCS: Performed by: INTERNAL MEDICINE

## 2018-06-03 PROCEDURE — 83735 ASSAY OF MAGNESIUM: CPT

## 2018-06-03 PROCEDURE — 2580000003 HC RX 258: Performed by: NURSE PRACTITIONER

## 2018-06-03 RX ORDER — ALPRAZOLAM 0.25 MG/1
0.25 TABLET ORAL NIGHTLY PRN
Status: COMPLETED | OUTPATIENT
Start: 2018-06-03 | End: 2018-06-04

## 2018-06-03 RX ORDER — ALBUTEROL SULFATE 2.5 MG/3ML
2.5 SOLUTION RESPIRATORY (INHALATION)
Status: DISCONTINUED | OUTPATIENT
Start: 2018-06-04 | End: 2018-06-04

## 2018-06-03 RX ORDER — ALBUTEROL SULFATE 2.5 MG/3ML
2.5 SOLUTION RESPIRATORY (INHALATION)
Status: DISCONTINUED | OUTPATIENT
Start: 2018-06-03 | End: 2018-06-03

## 2018-06-03 RX ADMIN — ATORVASTATIN CALCIUM 20 MG: 20 TABLET, FILM COATED ORAL at 10:07

## 2018-06-03 RX ADMIN — HYDROCODONE BITARTRATE AND ACETAMINOPHEN 1 TABLET: 5; 325 TABLET ORAL at 10:48

## 2018-06-03 RX ADMIN — INSULIN LISPRO 2 UNITS: 100 INJECTION, SOLUTION INTRAVENOUS; SUBCUTANEOUS at 13:54

## 2018-06-03 RX ADMIN — SODIUM CHLORIDE: 9 INJECTION, SOLUTION INTRAVENOUS at 10:10

## 2018-06-03 RX ADMIN — CHOLECALCIFEROL CAP 125 MCG (5000 UNIT) 5000 UNITS: 125 CAP at 10:07

## 2018-06-03 RX ADMIN — ALBUTEROL SULFATE 2.5 MG: 2.5 SOLUTION RESPIRATORY (INHALATION) at 16:24

## 2018-06-03 RX ADMIN — ACETYLCYSTEINE 600 MG: 200 SOLUTION ORAL; RESPIRATORY (INHALATION) at 19:19

## 2018-06-03 RX ADMIN — AZTREONAM 1 G: 1 INJECTION, POWDER, LYOPHILIZED, FOR SOLUTION INTRAMUSCULAR; INTRAVENOUS at 19:46

## 2018-06-03 RX ADMIN — LINAGLIPTIN 5 MG: 5 TABLET, FILM COATED ORAL at 10:07

## 2018-06-03 RX ADMIN — CYANOCOBALAMIN TAB 1000 MCG 1000 MCG: 1000 TAB at 10:07

## 2018-06-03 RX ADMIN — BUDESONIDE 500 MCG: 0.5 SUSPENSION RESPIRATORY (INHALATION) at 19:21

## 2018-06-03 RX ADMIN — ASPIRIN 81 MG: 81 TABLET, COATED ORAL at 10:07

## 2018-06-03 RX ADMIN — METOPROLOL TARTRATE 12.5 MG: 25 TABLET, FILM COATED ORAL at 10:08

## 2018-06-03 RX ADMIN — HYDROCODONE BITARTRATE AND ACETAMINOPHEN 1 TABLET: 5; 325 TABLET ORAL at 14:48

## 2018-06-03 RX ADMIN — METHYLPREDNISOLONE SODIUM SUCCINATE 60 MG: 125 INJECTION, POWDER, FOR SOLUTION INTRAMUSCULAR; INTRAVENOUS at 03:25

## 2018-06-03 RX ADMIN — HEPARIN SODIUM 5000 UNITS: 5000 INJECTION, SOLUTION INTRAVENOUS; SUBCUTANEOUS at 06:01

## 2018-06-03 RX ADMIN — METHYLPREDNISOLONE SODIUM SUCCINATE 60 MG: 125 INJECTION, POWDER, FOR SOLUTION INTRAMUSCULAR; INTRAVENOUS at 12:37

## 2018-06-03 RX ADMIN — ROPINIROLE HYDROCHLORIDE 2 MG: 2 TABLET, FILM COATED ORAL at 14:02

## 2018-06-03 RX ADMIN — FERROUS SULFATE TAB EC 325 MG (65 MG FE EQUIVALENT) 325 MG: 325 (65 FE) TABLET DELAYED RESPONSE at 18:28

## 2018-06-03 RX ADMIN — FERROUS SULFATE TAB EC 325 MG (65 MG FE EQUIVALENT) 325 MG: 325 (65 FE) TABLET DELAYED RESPONSE at 10:35

## 2018-06-03 RX ADMIN — INSULIN LISPRO 4 UNITS: 100 INJECTION, SOLUTION INTRAVENOUS; SUBCUTANEOUS at 04:00

## 2018-06-03 RX ADMIN — PANTOPRAZOLE SODIUM 40 MG: 40 TABLET, DELAYED RELEASE ORAL at 18:28

## 2018-06-03 RX ADMIN — ALBUTEROL SULFATE 2.5 MG: 2.5 SOLUTION RESPIRATORY (INHALATION) at 03:00

## 2018-06-03 RX ADMIN — Medication 10 ML: at 19:53

## 2018-06-03 RX ADMIN — CARBIDOPA AND LEVODOPA 1 TABLET: 25; 100 TABLET, EXTENDED RELEASE ORAL at 20:12

## 2018-06-03 RX ADMIN — AZITHROMYCIN MONOHYDRATE 250 MG: 250 TABLET ORAL at 10:08

## 2018-06-03 RX ADMIN — CARBIDOPA AND LEVODOPA 1 TABLET: 25; 100 TABLET, EXTENDED RELEASE ORAL at 14:49

## 2018-06-03 RX ADMIN — METHYLPREDNISOLONE SODIUM SUCCINATE 60 MG: 125 INJECTION, POWDER, FOR SOLUTION INTRAMUSCULAR; INTRAVENOUS at 20:11

## 2018-06-03 RX ADMIN — HEPARIN SODIUM 5000 UNITS: 5000 INJECTION, SOLUTION INTRAVENOUS; SUBCUTANEOUS at 13:55

## 2018-06-03 RX ADMIN — HYDROCODONE BITARTRATE AND ACETAMINOPHEN 1 TABLET: 5; 325 TABLET ORAL at 20:12

## 2018-06-03 RX ADMIN — SENNA 17.2 MG: 8.6 TABLET, COATED ORAL at 10:06

## 2018-06-03 RX ADMIN — PANTOPRAZOLE SODIUM 40 MG: 40 TABLET, DELAYED RELEASE ORAL at 06:01

## 2018-06-03 RX ADMIN — CALCITRIOL 0.25 MCG: 0.25 CAPSULE, LIQUID FILLED ORAL at 14:48

## 2018-06-03 RX ADMIN — HEPARIN SODIUM 5000 UNITS: 5000 INJECTION, SOLUTION INTRAVENOUS; SUBCUTANEOUS at 21:46

## 2018-06-03 RX ADMIN — CALCITRIOL 0.25 MCG: 0.25 CAPSULE, LIQUID FILLED ORAL at 20:12

## 2018-06-03 RX ADMIN — CARBIDOPA AND LEVODOPA 1 TABLET: 25; 100 TABLET, EXTENDED RELEASE ORAL at 10:08

## 2018-06-03 RX ADMIN — IPRATROPIUM BROMIDE AND ALBUTEROL SULFATE 1 AMPULE: .5; 3 SOLUTION RESPIRATORY (INHALATION) at 11:07

## 2018-06-03 RX ADMIN — INSULIN LISPRO 2 UNITS: 100 INJECTION, SOLUTION INTRAVENOUS; SUBCUTANEOUS at 18:28

## 2018-06-03 RX ADMIN — INSULIN LISPRO 5 UNITS: 100 INJECTION, SOLUTION INTRAVENOUS; SUBCUTANEOUS at 21:47

## 2018-06-03 RX ADMIN — ACETYLCYSTEINE 600 MG: 200 SOLUTION ORAL; RESPIRATORY (INHALATION) at 07:56

## 2018-06-03 RX ADMIN — CALCITRIOL 0.25 MCG: 0.25 CAPSULE, LIQUID FILLED ORAL at 10:08

## 2018-06-03 RX ADMIN — METOPROLOL TARTRATE 12.5 MG: 25 TABLET, FILM COATED ORAL at 20:11

## 2018-06-03 RX ADMIN — RASAGILINE MESYLATE 1 MG: 0.5 TABLET ORAL at 13:54

## 2018-06-03 RX ADMIN — ROPINIROLE HYDROCHLORIDE 2 MG: 2 TABLET, FILM COATED ORAL at 20:12

## 2018-06-03 RX ADMIN — CARBIDOPA AND LEVODOPA 1 TABLET: 25; 100 TABLET, EXTENDED RELEASE ORAL at 18:28

## 2018-06-03 RX ADMIN — BUDESONIDE 500 MCG: 0.5 SUSPENSION RESPIRATORY (INHALATION) at 07:56

## 2018-06-03 RX ADMIN — ANTACID TABLETS 500 MG: 500 TABLET, CHEWABLE ORAL at 20:12

## 2018-06-03 RX ADMIN — IPRATROPIUM BROMIDE AND ALBUTEROL SULFATE 1 AMPULE: .5; 3 SOLUTION RESPIRATORY (INHALATION) at 07:56

## 2018-06-03 RX ADMIN — ANTACID TABLETS 500 MG: 500 TABLET, CHEWABLE ORAL at 14:03

## 2018-06-03 RX ADMIN — Medication 400 MG: at 10:07

## 2018-06-03 RX ADMIN — AMIODARONE HYDROCHLORIDE 200 MG: 200 TABLET ORAL at 10:08

## 2018-06-03 RX ADMIN — ANTACID TABLETS 500 MG: 500 TABLET, CHEWABLE ORAL at 10:07

## 2018-06-03 RX ADMIN — ALBUTEROL SULFATE 2.5 MG: 2.5 SOLUTION RESPIRATORY (INHALATION) at 19:20

## 2018-06-03 RX ADMIN — ROPINIROLE HYDROCHLORIDE 2 MG: 2 TABLET, FILM COATED ORAL at 10:07

## 2018-06-03 ASSESSMENT — PAIN DESCRIPTION - FREQUENCY
FREQUENCY: CONTINUOUS

## 2018-06-03 ASSESSMENT — PAIN DESCRIPTION - PAIN TYPE
TYPE: SURGICAL PAIN
TYPE: CHRONIC PAIN

## 2018-06-03 ASSESSMENT — PAIN DESCRIPTION - PROGRESSION
CLINICAL_PROGRESSION: GRADUALLY WORSENING
CLINICAL_PROGRESSION: GRADUALLY IMPROVING
CLINICAL_PROGRESSION: GRADUALLY WORSENING
CLINICAL_PROGRESSION: NOT CHANGED
CLINICAL_PROGRESSION: GRADUALLY IMPROVING
CLINICAL_PROGRESSION: GRADUALLY WORSENING
CLINICAL_PROGRESSION: NOT CHANGED
CLINICAL_PROGRESSION: NOT CHANGED
CLINICAL_PROGRESSION: GRADUALLY WORSENING

## 2018-06-03 ASSESSMENT — PAIN DESCRIPTION - DESCRIPTORS
DESCRIPTORS: ACHING;DISCOMFORT;SORE
DESCRIPTORS: HEADACHE
DESCRIPTORS: ACHING;DISCOMFORT
DESCRIPTORS: ACHING
DESCRIPTORS: ACHING;DISCOMFORT;DULL
DESCRIPTORS: ACHING;DISCOMFORT
DESCRIPTORS: ACHING;DISCOMFORT

## 2018-06-03 ASSESSMENT — PAIN DESCRIPTION - LOCATION
LOCATION: HEAD
LOCATION: GENERALIZED

## 2018-06-03 ASSESSMENT — PAIN DESCRIPTION - ORIENTATION
ORIENTATION: OTHER (COMMENT)

## 2018-06-03 ASSESSMENT — PAIN SCALES - GENERAL
PAINLEVEL_OUTOF10: 5
PAINLEVEL_OUTOF10: 6
PAINLEVEL_OUTOF10: 5
PAINLEVEL_OUTOF10: 6
PAINLEVEL_OUTOF10: 5
PAINLEVEL_OUTOF10: 2
PAINLEVEL_OUTOF10: 4
PAINLEVEL_OUTOF10: 4

## 2018-06-03 ASSESSMENT — PAIN DESCRIPTION - ONSET
ONSET: ON-GOING
ONSET: GRADUAL
ONSET: ON-GOING

## 2018-06-04 ENCOUNTER — APPOINTMENT (OUTPATIENT)
Dept: ULTRASOUND IMAGING | Age: 73
DRG: 190 | End: 2018-06-04
Payer: COMMERCIAL

## 2018-06-04 ENCOUNTER — APPOINTMENT (OUTPATIENT)
Dept: GENERAL RADIOLOGY | Age: 73
DRG: 190 | End: 2018-06-04
Payer: COMMERCIAL

## 2018-06-04 LAB
-: ABNORMAL
AMORPHOUS: ABNORMAL
ANION GAP SERPL CALCULATED.3IONS-SCNC: 16 MMOL/L (ref 9–17)
BACTERIA: ABNORMAL
BILIRUBIN URINE: NEGATIVE
BUN BLDV-MCNC: 51 MG/DL (ref 8–23)
BUN/CREAT BLD: 32 (ref 9–20)
CALCIUM IONIZED: 1.11 MMOL/L (ref 1.13–1.33)
CALCIUM SERPL-MCNC: 7.8 MG/DL (ref 8.6–10.4)
CALCIUM URINE: 10.2 MG/DL
CASTS UA: ABNORMAL /LPF
CHLORIDE BLD-SCNC: 98 MMOL/L (ref 98–107)
CO2: 27 MMOL/L (ref 20–31)
COLOR: YELLOW
COMMENT UA: ABNORMAL
CREAT SERPL-MCNC: 1.59 MG/DL (ref 0.5–0.9)
CREATININE URINE: 78.7 MG/DL (ref 28–217)
CRYSTALS, UA: ABNORMAL /HPF
EPITHELIAL CELLS UA: ABNORMAL /HPF (ref 0–5)
GFR AFRICAN AMERICAN: 39 ML/MIN
GFR NON-AFRICAN AMERICAN: 32 ML/MIN
GFR SERPL CREATININE-BSD FRML MDRD: ABNORMAL ML/MIN/{1.73_M2}
GFR SERPL CREATININE-BSD FRML MDRD: ABNORMAL ML/MIN/{1.73_M2}
GLUCOSE BLD-MCNC: 272 MG/DL (ref 65–105)
GLUCOSE BLD-MCNC: 281 MG/DL (ref 70–99)
GLUCOSE BLD-MCNC: 285 MG/DL (ref 65–105)
GLUCOSE BLD-MCNC: 311 MG/DL (ref 65–105)
GLUCOSE BLD-MCNC: 367 MG/DL (ref 65–105)
GLUCOSE URINE: ABNORMAL
KETONES, URINE: NEGATIVE
LEUKOCYTE ESTERASE, URINE: ABNORMAL
MUCUS: ABNORMAL
NITRITE, URINE: NEGATIVE
OTHER OBSERVATIONS UA: ABNORMAL
PH UA: 6 (ref 5–8)
POTASSIUM SERPL-SCNC: 4.5 MMOL/L (ref 3.7–5.3)
PROTEIN UA: ABNORMAL
PTH INTACT: 6.32 PG/ML (ref 15–65)
RBC UA: ABNORMAL /HPF (ref 0–2)
RENAL EPITHELIAL, UA: ABNORMAL /HPF
SODIUM BLD-SCNC: 141 MMOL/L (ref 135–144)
SPECIFIC GRAVITY UA: 1.02 (ref 1–1.03)
TOTAL PROTEIN, URINE: 42 MG/DL
TRICHOMONAS: ABNORMAL
TURBIDITY: ABNORMAL
URINE HGB: ABNORMAL
UROBILINOGEN, URINE: NORMAL
VITAMIN D 25-HYDROXY: 52.8 NG/ML (ref 30–100)
WBC UA: ABNORMAL /HPF (ref 0–5)
YEAST: ABNORMAL

## 2018-06-04 PROCEDURE — 6360000002 HC RX W HCPCS: Performed by: INTERNAL MEDICINE

## 2018-06-04 PROCEDURE — 6360000002 HC RX W HCPCS: Performed by: NURSE PRACTITIONER

## 2018-06-04 PROCEDURE — 87186 SC STD MICRODIL/AGAR DIL: CPT

## 2018-06-04 PROCEDURE — 82947 ASSAY GLUCOSE BLOOD QUANT: CPT

## 2018-06-04 PROCEDURE — 87070 CULTURE OTHR SPECIMN AEROBIC: CPT

## 2018-06-04 PROCEDURE — 87205 SMEAR GRAM STAIN: CPT

## 2018-06-04 PROCEDURE — 6370000000 HC RX 637 (ALT 250 FOR IP): Performed by: INTERNAL MEDICINE

## 2018-06-04 PROCEDURE — 82306 VITAMIN D 25 HYDROXY: CPT

## 2018-06-04 PROCEDURE — 2500000003 HC RX 250 WO HCPCS: Performed by: INTERNAL MEDICINE

## 2018-06-04 PROCEDURE — 80048 BASIC METABOLIC PNL TOTAL CA: CPT

## 2018-06-04 PROCEDURE — 94667 MNPJ CHEST WALL 1ST: CPT

## 2018-06-04 PROCEDURE — 2580000003 HC RX 258: Performed by: NURSE PRACTITIONER

## 2018-06-04 PROCEDURE — 2060000000 HC ICU INTERMEDIATE R&B

## 2018-06-04 PROCEDURE — 2700000000 HC OXYGEN THERAPY PER DAY

## 2018-06-04 PROCEDURE — 94664 DEMO&/EVAL PT USE INHALER: CPT

## 2018-06-04 PROCEDURE — 87086 URINE CULTURE/COLONY COUNT: CPT

## 2018-06-04 PROCEDURE — 6370000000 HC RX 637 (ALT 250 FOR IP): Performed by: NURSE PRACTITIONER

## 2018-06-04 PROCEDURE — 94660 CPAP INITIATION&MGMT: CPT

## 2018-06-04 PROCEDURE — 84156 ASSAY OF PROTEIN URINE: CPT

## 2018-06-04 PROCEDURE — 81001 URINALYSIS AUTO W/SCOPE: CPT

## 2018-06-04 PROCEDURE — 36415 COLL VENOUS BLD VENIPUNCTURE: CPT

## 2018-06-04 PROCEDURE — 2580000003 HC RX 258: Performed by: INTERNAL MEDICINE

## 2018-06-04 PROCEDURE — 99232 SBSQ HOSP IP/OBS MODERATE 35: CPT | Performed by: INTERNAL MEDICINE

## 2018-06-04 PROCEDURE — 82340 ASSAY OF CALCIUM IN URINE: CPT

## 2018-06-04 PROCEDURE — 82330 ASSAY OF CALCIUM: CPT

## 2018-06-04 PROCEDURE — 82570 ASSAY OF URINE CREATININE: CPT

## 2018-06-04 PROCEDURE — 6360000002 HC RX W HCPCS: Performed by: FAMILY MEDICINE

## 2018-06-04 PROCEDURE — 6370000000 HC RX 637 (ALT 250 FOR IP): Performed by: FAMILY MEDICINE

## 2018-06-04 PROCEDURE — 94640 AIRWAY INHALATION TREATMENT: CPT

## 2018-06-04 PROCEDURE — 83970 ASSAY OF PARATHORMONE: CPT

## 2018-06-04 PROCEDURE — G8988 SELF CARE GOAL STATUS: HCPCS

## 2018-06-04 PROCEDURE — 94761 N-INVAS EAR/PLS OXIMETRY MLT: CPT

## 2018-06-04 PROCEDURE — 87088 URINE BACTERIA CULTURE: CPT

## 2018-06-04 PROCEDURE — 76770 US EXAM ABDO BACK WALL COMP: CPT

## 2018-06-04 PROCEDURE — 71045 X-RAY EXAM CHEST 1 VIEW: CPT

## 2018-06-04 PROCEDURE — 97535 SELF CARE MNGMENT TRAINING: CPT

## 2018-06-04 PROCEDURE — G8987 SELF CARE CURRENT STATUS: HCPCS

## 2018-06-04 RX ORDER — ALBUTEROL SULFATE 2.5 MG/3ML
2.5 SOLUTION RESPIRATORY (INHALATION) EVERY 4 HOURS
Status: DISCONTINUED | OUTPATIENT
Start: 2018-06-04 | End: 2018-06-07 | Stop reason: HOSPADM

## 2018-06-04 RX ORDER — METHYLPREDNISOLONE SODIUM SUCCINATE 40 MG/ML
40 INJECTION, POWDER, LYOPHILIZED, FOR SOLUTION INTRAMUSCULAR; INTRAVENOUS EVERY 8 HOURS
Status: DISCONTINUED | OUTPATIENT
Start: 2018-06-04 | End: 2018-06-06

## 2018-06-04 RX ADMIN — ANTACID TABLETS 500 MG: 500 TABLET, CHEWABLE ORAL at 19:43

## 2018-06-04 RX ADMIN — ROPINIROLE HYDROCHLORIDE 2 MG: 2 TABLET, FILM COATED ORAL at 07:56

## 2018-06-04 RX ADMIN — INSULIN LISPRO 10 UNITS: 100 INJECTION, SOLUTION INTRAVENOUS; SUBCUTANEOUS at 17:18

## 2018-06-04 RX ADMIN — BUDESONIDE 500 MCG: 0.5 SUSPENSION RESPIRATORY (INHALATION) at 07:54

## 2018-06-04 RX ADMIN — AZTREONAM 1 G: 1 INJECTION, POWDER, LYOPHILIZED, FOR SOLUTION INTRAMUSCULAR; INTRAVENOUS at 11:27

## 2018-06-04 RX ADMIN — FERROUS SULFATE TAB EC 325 MG (65 MG FE EQUIVALENT) 325 MG: 325 (65 FE) TABLET DELAYED RESPONSE at 17:19

## 2018-06-04 RX ADMIN — METHYLPREDNISOLONE SODIUM SUCCINATE 40 MG: 40 INJECTION, POWDER, FOR SOLUTION INTRAMUSCULAR; INTRAVENOUS at 19:43

## 2018-06-04 RX ADMIN — MICONAZORB AF ANTIFUNGAL POWDER: 1.42 POWDER TOPICAL at 11:28

## 2018-06-04 RX ADMIN — CALCITRIOL 0.25 MCG: 0.25 CAPSULE, LIQUID FILLED ORAL at 19:43

## 2018-06-04 RX ADMIN — ACETYLCYSTEINE 600 MG: 200 SOLUTION ORAL; RESPIRATORY (INHALATION) at 07:55

## 2018-06-04 RX ADMIN — HYDROCODONE BITARTRATE AND ACETAMINOPHEN 1 TABLET: 5; 325 TABLET ORAL at 12:42

## 2018-06-04 RX ADMIN — ALBUTEROL SULFATE 2.5 MG: 2.5 SOLUTION RESPIRATORY (INHALATION) at 11:42

## 2018-06-04 RX ADMIN — HEPARIN SODIUM 5000 UNITS: 5000 INJECTION, SOLUTION INTRAVENOUS; SUBCUTANEOUS at 14:09

## 2018-06-04 RX ADMIN — CYANOCOBALAMIN TAB 1000 MCG 1000 MCG: 1000 TAB at 07:55

## 2018-06-04 RX ADMIN — INSULIN LISPRO 3 UNITS: 100 INJECTION, SOLUTION INTRAVENOUS; SUBCUTANEOUS at 21:25

## 2018-06-04 RX ADMIN — ALBUTEROL SULFATE 2.5 MG: 2.5 SOLUTION RESPIRATORY (INHALATION) at 07:54

## 2018-06-04 RX ADMIN — RASAGILINE MESYLATE 1 MG: 0.5 TABLET ORAL at 08:32

## 2018-06-04 RX ADMIN — FERROUS SULFATE TAB EC 325 MG (65 MG FE EQUIVALENT) 325 MG: 325 (65 FE) TABLET DELAYED RESPONSE at 07:56

## 2018-06-04 RX ADMIN — INSULIN LISPRO 8 UNITS: 100 INJECTION, SOLUTION INTRAVENOUS; SUBCUTANEOUS at 08:00

## 2018-06-04 RX ADMIN — METHYLPREDNISOLONE SODIUM SUCCINATE 40 MG: 40 INJECTION, POWDER, FOR SOLUTION INTRAMUSCULAR; INTRAVENOUS at 11:27

## 2018-06-04 RX ADMIN — ALBUTEROL SULFATE 2.5 MG: 2.5 SOLUTION RESPIRATORY (INHALATION) at 23:45

## 2018-06-04 RX ADMIN — ACETYLCYSTEINE 600 MG: 200 SOLUTION ORAL; RESPIRATORY (INHALATION) at 19:57

## 2018-06-04 RX ADMIN — AMIODARONE HYDROCHLORIDE 200 MG: 200 TABLET ORAL at 07:56

## 2018-06-04 RX ADMIN — ROPINIROLE HYDROCHLORIDE 2 MG: 2 TABLET, FILM COATED ORAL at 14:09

## 2018-06-04 RX ADMIN — ANTACID TABLETS 500 MG: 500 TABLET, CHEWABLE ORAL at 14:09

## 2018-06-04 RX ADMIN — HYDROCODONE BITARTRATE AND ACETAMINOPHEN 1 TABLET: 5; 325 TABLET ORAL at 03:42

## 2018-06-04 RX ADMIN — ANTACID TABLETS 500 MG: 500 TABLET, CHEWABLE ORAL at 07:56

## 2018-06-04 RX ADMIN — METHYLPREDNISOLONE SODIUM SUCCINATE 60 MG: 125 INJECTION, POWDER, FOR SOLUTION INTRAMUSCULAR; INTRAVENOUS at 03:36

## 2018-06-04 RX ADMIN — Medication 10 ML: at 08:32

## 2018-06-04 RX ADMIN — INSULIN LISPRO 6 UNITS: 100 INJECTION, SOLUTION INTRAVENOUS; SUBCUTANEOUS at 08:18

## 2018-06-04 RX ADMIN — ALPRAZOLAM 0.25 MG: 0.25 TABLET ORAL at 20:02

## 2018-06-04 RX ADMIN — AZITHROMYCIN MONOHYDRATE 250 MG: 250 TABLET ORAL at 07:57

## 2018-06-04 RX ADMIN — SENNA 17.2 MG: 8.6 TABLET, COATED ORAL at 07:56

## 2018-06-04 RX ADMIN — CALCITRIOL 0.25 MCG: 0.25 CAPSULE, LIQUID FILLED ORAL at 14:09

## 2018-06-04 RX ADMIN — ALBUTEROL SULFATE 2.5 MG: 2.5 SOLUTION RESPIRATORY (INHALATION) at 19:57

## 2018-06-04 RX ADMIN — BUDESONIDE 500 MCG: 0.5 SUSPENSION RESPIRATORY (INHALATION) at 19:58

## 2018-06-04 RX ADMIN — CARBIDOPA AND LEVODOPA 1 TABLET: 25; 100 TABLET, EXTENDED RELEASE ORAL at 19:45

## 2018-06-04 RX ADMIN — Medication 400 MG: at 07:56

## 2018-06-04 RX ADMIN — AZTREONAM 1 G: 1 INJECTION, POWDER, LYOPHILIZED, FOR SOLUTION INTRAMUSCULAR; INTRAVENOUS at 03:36

## 2018-06-04 RX ADMIN — ALBUTEROL SULFATE 2.5 MG: 2.5 SOLUTION RESPIRATORY (INHALATION) at 16:53

## 2018-06-04 RX ADMIN — ATORVASTATIN CALCIUM 20 MG: 20 TABLET, FILM COATED ORAL at 07:56

## 2018-06-04 RX ADMIN — METOPROLOL TARTRATE 12.5 MG: 25 TABLET, FILM COATED ORAL at 19:44

## 2018-06-04 RX ADMIN — HEPARIN SODIUM 5000 UNITS: 5000 INJECTION, SOLUTION INTRAVENOUS; SUBCUTANEOUS at 20:02

## 2018-06-04 RX ADMIN — CARBIDOPA AND LEVODOPA 1 TABLET: 25; 100 TABLET, EXTENDED RELEASE ORAL at 17:19

## 2018-06-04 RX ADMIN — HYDROCODONE BITARTRATE AND ACETAMINOPHEN 1 TABLET: 5; 325 TABLET ORAL at 19:42

## 2018-06-04 RX ADMIN — METOPROLOL TARTRATE 12.5 MG: 25 TABLET, FILM COATED ORAL at 07:57

## 2018-06-04 RX ADMIN — CHOLECALCIFEROL CAP 125 MCG (5000 UNIT) 5000 UNITS: 125 CAP at 07:56

## 2018-06-04 RX ADMIN — ASPIRIN 81 MG: 81 TABLET, COATED ORAL at 07:56

## 2018-06-04 RX ADMIN — PANTOPRAZOLE SODIUM 40 MG: 40 TABLET, DELAYED RELEASE ORAL at 19:20

## 2018-06-04 RX ADMIN — CARBIDOPA AND LEVODOPA 1 TABLET: 25; 100 TABLET, EXTENDED RELEASE ORAL at 12:36

## 2018-06-04 RX ADMIN — ROPINIROLE HYDROCHLORIDE 2 MG: 2 TABLET, FILM COATED ORAL at 19:43

## 2018-06-04 RX ADMIN — AZTREONAM 1 G: 1 INJECTION, POWDER, LYOPHILIZED, FOR SOLUTION INTRAMUSCULAR; INTRAVENOUS at 19:43

## 2018-06-04 RX ADMIN — CALCITRIOL 0.25 MCG: 0.25 CAPSULE, LIQUID FILLED ORAL at 07:56

## 2018-06-04 RX ADMIN — CARBIDOPA AND LEVODOPA 1 TABLET: 25; 100 TABLET, EXTENDED RELEASE ORAL at 07:57

## 2018-06-04 ASSESSMENT — PAIN DESCRIPTION - PROGRESSION
CLINICAL_PROGRESSION: GRADUALLY WORSENING

## 2018-06-04 ASSESSMENT — PAIN DESCRIPTION - FREQUENCY: FREQUENCY: CONTINUOUS

## 2018-06-04 ASSESSMENT — PAIN DESCRIPTION - PAIN TYPE
TYPE: CHRONIC PAIN

## 2018-06-04 ASSESSMENT — PAIN SCALES - GENERAL
PAINLEVEL_OUTOF10: 0
PAINLEVEL_OUTOF10: 6
PAINLEVEL_OUTOF10: 6
PAINLEVEL_OUTOF10: 0
PAINLEVEL_OUTOF10: 10

## 2018-06-04 ASSESSMENT — PAIN DESCRIPTION - DESCRIPTORS
DESCRIPTORS: ACHING
DESCRIPTORS: ACHING

## 2018-06-04 ASSESSMENT — PAIN DESCRIPTION - LOCATION
LOCATION: GENERALIZED

## 2018-06-05 ENCOUNTER — ANESTHESIA (OUTPATIENT)
Dept: OPERATING ROOM | Age: 73
DRG: 190 | End: 2018-06-05
Payer: COMMERCIAL

## 2018-06-05 ENCOUNTER — ANESTHESIA EVENT (OUTPATIENT)
Dept: OPERATING ROOM | Age: 73
DRG: 190 | End: 2018-06-05
Payer: COMMERCIAL

## 2018-06-05 VITALS — DIASTOLIC BLOOD PRESSURE: 96 MMHG | OXYGEN SATURATION: 99 % | SYSTOLIC BLOOD PRESSURE: 217 MMHG

## 2018-06-05 LAB
ANION GAP SERPL CALCULATED.3IONS-SCNC: 13 MMOL/L (ref 9–17)
BUN BLDV-MCNC: 48 MG/DL (ref 8–23)
BUN/CREAT BLD: 32 (ref 9–20)
CALCIUM SERPL-MCNC: 7.5 MG/DL (ref 8.6–10.4)
CHLORIDE BLD-SCNC: 98 MMOL/L (ref 98–107)
CO2: 30 MMOL/L (ref 20–31)
CREAT SERPL-MCNC: 1.51 MG/DL (ref 0.5–0.9)
GFR AFRICAN AMERICAN: 41 ML/MIN
GFR NON-AFRICAN AMERICAN: 34 ML/MIN
GFR SERPL CREATININE-BSD FRML MDRD: ABNORMAL ML/MIN/{1.73_M2}
GFR SERPL CREATININE-BSD FRML MDRD: ABNORMAL ML/MIN/{1.73_M2}
GLUCOSE BLD-MCNC: 174 MG/DL (ref 65–105)
GLUCOSE BLD-MCNC: 251 MG/DL (ref 65–105)
GLUCOSE BLD-MCNC: 257 MG/DL (ref 65–105)
GLUCOSE BLD-MCNC: 298 MG/DL (ref 70–99)
GLUCOSE BLD-MCNC: 312 MG/DL (ref 65–105)
GLUCOSE BLD-MCNC: 356 MG/DL (ref 65–105)
PHOSPHORUS: 2.7 MG/DL (ref 2.6–4.5)
POTASSIUM SERPL-SCNC: 4.1 MMOL/L (ref 3.7–5.3)
SODIUM BLD-SCNC: 141 MMOL/L (ref 135–144)

## 2018-06-05 PROCEDURE — 0CJS8ZZ INSPECTION OF LARYNX, VIA NATURAL OR ARTIFICIAL OPENING ENDOSCOPIC: ICD-10-PCS | Performed by: OTOLARYNGOLOGY

## 2018-06-05 PROCEDURE — 3700000000 HC ANESTHESIA ATTENDED CARE: Performed by: INTERNAL MEDICINE

## 2018-06-05 PROCEDURE — 2700000000 HC OXYGEN THERAPY PER DAY

## 2018-06-05 PROCEDURE — 6370000000 HC RX 637 (ALT 250 FOR IP): Performed by: INTERNAL MEDICINE

## 2018-06-05 PROCEDURE — 2580000003 HC RX 258: Performed by: INTERNAL MEDICINE

## 2018-06-05 PROCEDURE — 0BJ08ZZ INSPECTION OF TRACHEOBRONCHIAL TREE, VIA NATURAL OR ARTIFICIAL OPENING ENDOSCOPIC: ICD-10-PCS | Performed by: INTERNAL MEDICINE

## 2018-06-05 PROCEDURE — 6360000002 HC RX W HCPCS: Performed by: NURSE PRACTITIONER

## 2018-06-05 PROCEDURE — 2580000003 HC RX 258: Performed by: NURSE PRACTITIONER

## 2018-06-05 PROCEDURE — 7100000011 HC PHASE II RECOVERY - ADDTL 15 MIN: Performed by: INTERNAL MEDICINE

## 2018-06-05 PROCEDURE — 94640 AIRWAY INHALATION TREATMENT: CPT

## 2018-06-05 PROCEDURE — 3609027000 HC BRONCHOSCOPY: Performed by: INTERNAL MEDICINE

## 2018-06-05 PROCEDURE — 2580000003 HC RX 258

## 2018-06-05 PROCEDURE — 6360000002 HC RX W HCPCS: Performed by: INTERNAL MEDICINE

## 2018-06-05 PROCEDURE — 99232 SBSQ HOSP IP/OBS MODERATE 35: CPT | Performed by: INTERNAL MEDICINE

## 2018-06-05 PROCEDURE — 97530 THERAPEUTIC ACTIVITIES: CPT

## 2018-06-05 PROCEDURE — 2500000003 HC RX 250 WO HCPCS

## 2018-06-05 PROCEDURE — 84100 ASSAY OF PHOSPHORUS: CPT

## 2018-06-05 PROCEDURE — 97116 GAIT TRAINING THERAPY: CPT

## 2018-06-05 PROCEDURE — 6370000000 HC RX 637 (ALT 250 FOR IP): Performed by: FAMILY MEDICINE

## 2018-06-05 PROCEDURE — A6402 STERILE GAUZE <= 16 SQ IN: HCPCS | Performed by: INTERNAL MEDICINE

## 2018-06-05 PROCEDURE — 3700000001 HC ADD 15 MINUTES (ANESTHESIA): Performed by: INTERNAL MEDICINE

## 2018-06-05 PROCEDURE — 6370000000 HC RX 637 (ALT 250 FOR IP): Performed by: NURSE PRACTITIONER

## 2018-06-05 PROCEDURE — 6360000002 HC RX W HCPCS: Performed by: FAMILY MEDICINE

## 2018-06-05 PROCEDURE — 94761 N-INVAS EAR/PLS OXIMETRY MLT: CPT

## 2018-06-05 PROCEDURE — 2500000003 HC RX 250 WO HCPCS: Performed by: INTERNAL MEDICINE

## 2018-06-05 PROCEDURE — 82947 ASSAY GLUCOSE BLOOD QUANT: CPT

## 2018-06-05 PROCEDURE — 6360000002 HC RX W HCPCS

## 2018-06-05 PROCEDURE — 36415 COLL VENOUS BLD VENIPUNCTURE: CPT

## 2018-06-05 PROCEDURE — 94660 CPAP INITIATION&MGMT: CPT

## 2018-06-05 PROCEDURE — 80048 BASIC METABOLIC PNL TOTAL CA: CPT

## 2018-06-05 PROCEDURE — 7100000010 HC PHASE II RECOVERY - FIRST 15 MIN: Performed by: INTERNAL MEDICINE

## 2018-06-05 PROCEDURE — 2060000000 HC ICU INTERMEDIATE R&B

## 2018-06-05 RX ORDER — ALPRAZOLAM 0.25 MG/1
0.25 TABLET ORAL 3 TIMES DAILY PRN
Status: DISCONTINUED | OUTPATIENT
Start: 2018-06-05 | End: 2018-06-07 | Stop reason: HOSPADM

## 2018-06-05 RX ORDER — KETAMINE HYDROCHLORIDE 100 MG/ML
INJECTION, SOLUTION INTRAMUSCULAR; INTRAVENOUS PRN
Status: DISCONTINUED | OUTPATIENT
Start: 2018-06-05 | End: 2018-06-05 | Stop reason: SDUPTHER

## 2018-06-05 RX ORDER — MIDAZOLAM HYDROCHLORIDE 1 MG/ML
INJECTION INTRAMUSCULAR; INTRAVENOUS PRN
Status: DISCONTINUED | OUTPATIENT
Start: 2018-06-05 | End: 2018-06-05 | Stop reason: SDUPTHER

## 2018-06-05 RX ORDER — LIDOCAINE HYDROCHLORIDE 10 MG/ML
INJECTION, SOLUTION EPIDURAL; INFILTRATION; INTRACAUDAL; PERINEURAL PRN
Status: DISCONTINUED | OUTPATIENT
Start: 2018-06-05 | End: 2018-06-05 | Stop reason: HOSPADM

## 2018-06-05 RX ORDER — NYSTATIN 100000 U/G
CREAM TOPICAL 2 TIMES DAILY
Status: DISCONTINUED | OUTPATIENT
Start: 2018-06-05 | End: 2018-06-07 | Stop reason: HOSPADM

## 2018-06-05 RX ORDER — BUTALBITAL, ACETAMINOPHEN AND CAFFEINE 50; 325; 40 MG/1; MG/1; MG/1
1 TABLET ORAL EVERY 4 HOURS PRN
Status: DISCONTINUED | OUTPATIENT
Start: 2018-06-05 | End: 2018-06-07 | Stop reason: HOSPADM

## 2018-06-05 RX ORDER — SODIUM CHLORIDE, SODIUM LACTATE, POTASSIUM CHLORIDE, CALCIUM CHLORIDE 600; 310; 30; 20 MG/100ML; MG/100ML; MG/100ML; MG/100ML
INJECTION, SOLUTION INTRAVENOUS CONTINUOUS PRN
Status: DISCONTINUED | OUTPATIENT
Start: 2018-06-05 | End: 2018-06-05 | Stop reason: SDUPTHER

## 2018-06-05 RX ORDER — ESMOLOL HYDROCHLORIDE 10 MG/ML
INJECTION INTRAVENOUS PRN
Status: DISCONTINUED | OUTPATIENT
Start: 2018-06-05 | End: 2018-06-05 | Stop reason: SDUPTHER

## 2018-06-05 RX ADMIN — HYDROCODONE BITARTRATE AND ACETAMINOPHEN 1 TABLET: 5; 325 TABLET ORAL at 15:26

## 2018-06-05 RX ADMIN — CARBIDOPA AND LEVODOPA 1 TABLET: 25; 100 TABLET, EXTENDED RELEASE ORAL at 13:16

## 2018-06-05 RX ADMIN — ALBUTEROL SULFATE 2.5 MG: 2.5 SOLUTION RESPIRATORY (INHALATION) at 23:40

## 2018-06-05 RX ADMIN — ANTACID TABLETS 500 MG: 500 TABLET, CHEWABLE ORAL at 08:27

## 2018-06-05 RX ADMIN — CYANOCOBALAMIN TAB 1000 MCG 1000 MCG: 1000 TAB at 08:27

## 2018-06-05 RX ADMIN — MIDAZOLAM HYDROCHLORIDE 1 MG: 1 INJECTION, SOLUTION INTRAMUSCULAR; INTRAVENOUS at 09:19

## 2018-06-05 RX ADMIN — FERROUS SULFATE TAB EC 325 MG (65 MG FE EQUIVALENT) 325 MG: 325 (65 FE) TABLET DELAYED RESPONSE at 08:00

## 2018-06-05 RX ADMIN — KETAMINE HYDROCHLORIDE 10 MG: 100 INJECTION INTRAMUSCULAR; INTRAVENOUS at 09:20

## 2018-06-05 RX ADMIN — AZTREONAM 1 G: 1 INJECTION, POWDER, LYOPHILIZED, FOR SOLUTION INTRAMUSCULAR; INTRAVENOUS at 12:07

## 2018-06-05 RX ADMIN — ALBUTEROL SULFATE 2.5 MG: 2.5 SOLUTION RESPIRATORY (INHALATION) at 11:19

## 2018-06-05 RX ADMIN — ALBUTEROL SULFATE 2.5 MG: 2.5 SOLUTION RESPIRATORY (INHALATION) at 20:27

## 2018-06-05 RX ADMIN — Medication 10 ML: at 11:57

## 2018-06-05 RX ADMIN — ATORVASTATIN CALCIUM 20 MG: 20 TABLET, FILM COATED ORAL at 08:27

## 2018-06-05 RX ADMIN — RASAGILINE MESYLATE 1 MG: 0.5 TABLET ORAL at 08:25

## 2018-06-05 RX ADMIN — HEPARIN SODIUM 5000 UNITS: 5000 INJECTION, SOLUTION INTRAVENOUS; SUBCUTANEOUS at 13:16

## 2018-06-05 RX ADMIN — INSULIN LISPRO 6 UNITS: 100 INJECTION, SOLUTION INTRAVENOUS; SUBCUTANEOUS at 17:25

## 2018-06-05 RX ADMIN — METHYLPREDNISOLONE SODIUM SUCCINATE 40 MG: 40 INJECTION, POWDER, FOR SOLUTION INTRAMUSCULAR; INTRAVENOUS at 03:18

## 2018-06-05 RX ADMIN — FERROUS SULFATE TAB EC 325 MG (65 MG FE EQUIVALENT) 325 MG: 325 (65 FE) TABLET DELAYED RESPONSE at 17:24

## 2018-06-05 RX ADMIN — ALPRAZOLAM 0.25 MG: 0.25 TABLET ORAL at 20:42

## 2018-06-05 RX ADMIN — MICONAZORB AF ANTIFUNGAL POWDER: 1.42 POWDER TOPICAL at 11:56

## 2018-06-05 RX ADMIN — ALBUTEROL SULFATE 2.5 MG: 2.5 SOLUTION RESPIRATORY (INHALATION) at 07:37

## 2018-06-05 RX ADMIN — AMIODARONE HYDROCHLORIDE 200 MG: 200 TABLET ORAL at 08:27

## 2018-06-05 RX ADMIN — CALCITRIOL 0.25 MCG: 0.25 CAPSULE, LIQUID FILLED ORAL at 20:40

## 2018-06-05 RX ADMIN — PANTOPRAZOLE SODIUM 40 MG: 40 TABLET, DELAYED RELEASE ORAL at 05:25

## 2018-06-05 RX ADMIN — ASPIRIN 81 MG: 81 TABLET, COATED ORAL at 11:56

## 2018-06-05 RX ADMIN — AZITHROMYCIN MONOHYDRATE 250 MG: 250 TABLET ORAL at 08:27

## 2018-06-05 RX ADMIN — AZTREONAM 1 G: 1 INJECTION, POWDER, LYOPHILIZED, FOR SOLUTION INTRAMUSCULAR; INTRAVENOUS at 20:36

## 2018-06-05 RX ADMIN — AZTREONAM 1 G: 1 INJECTION, POWDER, LYOPHILIZED, FOR SOLUTION INTRAMUSCULAR; INTRAVENOUS at 03:18

## 2018-06-05 RX ADMIN — LINAGLIPTIN 5 MG: 5 TABLET, FILM COATED ORAL at 08:27

## 2018-06-05 RX ADMIN — ESMOLOL HYDROCHLORIDE 20 MG: 10 INJECTION, SOLUTION INTRAVENOUS at 09:31

## 2018-06-05 RX ADMIN — BUDESONIDE 500 MCG: 0.5 SUSPENSION RESPIRATORY (INHALATION) at 07:38

## 2018-06-05 RX ADMIN — NYSTATIN: 100000 CREAM TOPICAL at 13:22

## 2018-06-05 RX ADMIN — ALBUTEROL SULFATE 2.5 MG: 2.5 SOLUTION RESPIRATORY (INHALATION) at 03:49

## 2018-06-05 RX ADMIN — CALCITRIOL 0.25 MCG: 0.25 CAPSULE, LIQUID FILLED ORAL at 08:25

## 2018-06-05 RX ADMIN — ROPINIROLE HYDROCHLORIDE 2 MG: 2 TABLET, FILM COATED ORAL at 08:25

## 2018-06-05 RX ADMIN — METOPROLOL TARTRATE 12.5 MG: 25 TABLET, FILM COATED ORAL at 20:39

## 2018-06-05 RX ADMIN — ESMOLOL HYDROCHLORIDE 20 MG: 10 INJECTION, SOLUTION INTRAVENOUS at 09:25

## 2018-06-05 RX ADMIN — ANTACID TABLETS 500 MG: 500 TABLET, CHEWABLE ORAL at 13:15

## 2018-06-05 RX ADMIN — METHYLPREDNISOLONE SODIUM SUCCINATE 40 MG: 40 INJECTION, POWDER, FOR SOLUTION INTRAMUSCULAR; INTRAVENOUS at 12:06

## 2018-06-05 RX ADMIN — CARBIDOPA AND LEVODOPA 1 TABLET: 25; 100 TABLET, EXTENDED RELEASE ORAL at 20:41

## 2018-06-05 RX ADMIN — Medication 400 MG: at 08:27

## 2018-06-05 RX ADMIN — HEPARIN SODIUM 5000 UNITS: 5000 INJECTION, SOLUTION INTRAVENOUS; SUBCUTANEOUS at 05:25

## 2018-06-05 RX ADMIN — KETAMINE HYDROCHLORIDE 5 MG: 100 INJECTION INTRAMUSCULAR; INTRAVENOUS at 09:28

## 2018-06-05 RX ADMIN — KETAMINE HYDROCHLORIDE 10 MG: 100 INJECTION INTRAMUSCULAR; INTRAVENOUS at 09:26

## 2018-06-05 RX ADMIN — METHYLPREDNISOLONE SODIUM SUCCINATE 40 MG: 40 INJECTION, POWDER, FOR SOLUTION INTRAMUSCULAR; INTRAVENOUS at 20:37

## 2018-06-05 RX ADMIN — SENNA 17.2 MG: 8.6 TABLET, COATED ORAL at 08:27

## 2018-06-05 RX ADMIN — INSULIN LISPRO 5 UNITS: 100 INJECTION, SOLUTION INTRAVENOUS; SUBCUTANEOUS at 20:44

## 2018-06-05 RX ADMIN — ROPINIROLE HYDROCHLORIDE 2 MG: 2 TABLET, FILM COATED ORAL at 20:41

## 2018-06-05 RX ADMIN — MICONAZORB AF ANTIFUNGAL POWDER: 1.42 POWDER TOPICAL at 00:00

## 2018-06-05 RX ADMIN — CARBIDOPA AND LEVODOPA 1 TABLET: 25; 100 TABLET, EXTENDED RELEASE ORAL at 17:24

## 2018-06-05 RX ADMIN — ALPRAZOLAM 0.25 MG: 0.25 TABLET ORAL at 12:07

## 2018-06-05 RX ADMIN — HYDROCODONE BITARTRATE AND ACETAMINOPHEN 1 TABLET: 5; 325 TABLET ORAL at 20:41

## 2018-06-05 RX ADMIN — INSULIN LISPRO 8 UNITS: 100 INJECTION, SOLUTION INTRAVENOUS; SUBCUTANEOUS at 08:00

## 2018-06-05 RX ADMIN — ROPINIROLE HYDROCHLORIDE 2 MG: 2 TABLET, FILM COATED ORAL at 13:15

## 2018-06-05 RX ADMIN — CALCITRIOL 0.25 MCG: 0.25 CAPSULE, LIQUID FILLED ORAL at 13:15

## 2018-06-05 RX ADMIN — CARBIDOPA AND LEVODOPA 1 TABLET: 25; 100 TABLET, EXTENDED RELEASE ORAL at 08:27

## 2018-06-05 RX ADMIN — PANTOPRAZOLE SODIUM 40 MG: 40 TABLET, DELAYED RELEASE ORAL at 15:45

## 2018-06-05 RX ADMIN — NYSTATIN: 100000 CREAM TOPICAL at 20:53

## 2018-06-05 RX ADMIN — ALBUTEROL SULFATE 2.5 MG: 2.5 SOLUTION RESPIRATORY (INHALATION) at 14:52

## 2018-06-05 RX ADMIN — Medication 10 ML: at 20:39

## 2018-06-05 RX ADMIN — POLYETHYLENE GLYCOL 3350 17 G: 17 POWDER, FOR SOLUTION ORAL at 12:07

## 2018-06-05 RX ADMIN — SODIUM CHLORIDE, POTASSIUM CHLORIDE, SODIUM LACTATE AND CALCIUM CHLORIDE: 600; 310; 30; 20 INJECTION, SOLUTION INTRAVENOUS at 09:14

## 2018-06-05 RX ADMIN — CHOLECALCIFEROL CAP 125 MCG (5000 UNIT) 5000 UNITS: 125 CAP at 08:27

## 2018-06-05 RX ADMIN — ANTACID TABLETS 500 MG: 500 TABLET, CHEWABLE ORAL at 20:39

## 2018-06-05 RX ADMIN — INSULIN LISPRO 2 UNITS: 100 INJECTION, SOLUTION INTRAVENOUS; SUBCUTANEOUS at 12:16

## 2018-06-05 RX ADMIN — HYDROCODONE BITARTRATE AND ACETAMINOPHEN 1 TABLET: 5; 325 TABLET ORAL at 03:18

## 2018-06-05 RX ADMIN — BUDESONIDE 500 MCG: 0.5 SUSPENSION RESPIRATORY (INHALATION) at 20:27

## 2018-06-05 RX ADMIN — HEPARIN SODIUM 5000 UNITS: 5000 INJECTION, SOLUTION INTRAVENOUS; SUBCUTANEOUS at 20:44

## 2018-06-05 RX ADMIN — METOPROLOL TARTRATE 12.5 MG: 25 TABLET, FILM COATED ORAL at 08:20

## 2018-06-05 ASSESSMENT — PAIN DESCRIPTION - LOCATION
LOCATION: GENERALIZED
LOCATION: GENERALIZED;LEG

## 2018-06-05 ASSESSMENT — PULMONARY FUNCTION TESTS
PIF_VALUE: 0
PIF_VALUE: 1
PIF_VALUE: 3
PIF_VALUE: 0
PIF_VALUE: 1
PIF_VALUE: 2
PIF_VALUE: 1
PIF_VALUE: 0
PIF_VALUE: 1

## 2018-06-05 ASSESSMENT — PAIN SCALES - GENERAL
PAINLEVEL_OUTOF10: 5
PAINLEVEL_OUTOF10: 0
PAINLEVEL_OUTOF10: 0
PAINLEVEL_OUTOF10: 5
PAINLEVEL_OUTOF10: 4
PAINLEVEL_OUTOF10: 0
PAINLEVEL_OUTOF10: 0
PAINLEVEL_OUTOF10: 6
PAINLEVEL_OUTOF10: 0

## 2018-06-05 ASSESSMENT — PAIN DESCRIPTION - FREQUENCY: FREQUENCY: INTERMITTENT

## 2018-06-05 ASSESSMENT — PAIN DESCRIPTION - PAIN TYPE
TYPE: CHRONIC PAIN
TYPE: CHRONIC PAIN

## 2018-06-05 ASSESSMENT — PAIN DESCRIPTION - ORIENTATION: ORIENTATION: RIGHT;LEFT

## 2018-06-06 LAB
ANION GAP SERPL CALCULATED.3IONS-SCNC: 16 MMOL/L (ref 9–17)
BUN BLDV-MCNC: 44 MG/DL (ref 8–23)
BUN/CREAT BLD: 33 (ref 9–20)
CALCIUM SERPL-MCNC: 7.4 MG/DL (ref 8.6–10.4)
CHLORIDE BLD-SCNC: 97 MMOL/L (ref 98–107)
CO2: 26 MMOL/L (ref 20–31)
CREAT SERPL-MCNC: 1.33 MG/DL (ref 0.5–0.9)
CULTURE: ABNORMAL
DIRECT EXAM: ABNORMAL
GFR AFRICAN AMERICAN: 48 ML/MIN
GFR NON-AFRICAN AMERICAN: 39 ML/MIN
GFR SERPL CREATININE-BSD FRML MDRD: ABNORMAL ML/MIN/{1.73_M2}
GFR SERPL CREATININE-BSD FRML MDRD: ABNORMAL ML/MIN/{1.73_M2}
GLUCOSE BLD-MCNC: 288 MG/DL (ref 65–105)
GLUCOSE BLD-MCNC: 321 MG/DL (ref 65–105)
GLUCOSE BLD-MCNC: 392 MG/DL (ref 65–105)
GLUCOSE BLD-MCNC: 396 MG/DL (ref 70–99)
GLUCOSE BLD-MCNC: 422 MG/DL (ref 65–105)
Lab: ABNORMAL
Lab: ABNORMAL
ORGANISM: ABNORMAL
POTASSIUM SERPL-SCNC: 4.5 MMOL/L (ref 3.7–5.3)
SODIUM BLD-SCNC: 139 MMOL/L (ref 135–144)
SPECIMEN DESCRIPTION: ABNORMAL
STATUS: ABNORMAL
STATUS: ABNORMAL

## 2018-06-06 PROCEDURE — 6370000000 HC RX 637 (ALT 250 FOR IP): Performed by: FAMILY MEDICINE

## 2018-06-06 PROCEDURE — 6370000000 HC RX 637 (ALT 250 FOR IP): Performed by: NURSE PRACTITIONER

## 2018-06-06 PROCEDURE — 2700000000 HC OXYGEN THERAPY PER DAY

## 2018-06-06 PROCEDURE — 36415 COLL VENOUS BLD VENIPUNCTURE: CPT

## 2018-06-06 PROCEDURE — 97110 THERAPEUTIC EXERCISES: CPT

## 2018-06-06 PROCEDURE — 6370000000 HC RX 637 (ALT 250 FOR IP): Performed by: INTERNAL MEDICINE

## 2018-06-06 PROCEDURE — 6360000002 HC RX W HCPCS: Performed by: NURSE PRACTITIONER

## 2018-06-06 PROCEDURE — 2580000003 HC RX 258: Performed by: NURSE PRACTITIONER

## 2018-06-06 PROCEDURE — 93924 LWR XTR VASC STDY BILAT: CPT

## 2018-06-06 PROCEDURE — 94640 AIRWAY INHALATION TREATMENT: CPT

## 2018-06-06 PROCEDURE — 80048 BASIC METABOLIC PNL TOTAL CA: CPT

## 2018-06-06 PROCEDURE — 82947 ASSAY GLUCOSE BLOOD QUANT: CPT

## 2018-06-06 PROCEDURE — 2500000003 HC RX 250 WO HCPCS: Performed by: INTERNAL MEDICINE

## 2018-06-06 PROCEDURE — 94660 CPAP INITIATION&MGMT: CPT

## 2018-06-06 PROCEDURE — 2060000000 HC ICU INTERMEDIATE R&B

## 2018-06-06 PROCEDURE — 97535 SELF CARE MNGMENT TRAINING: CPT | Performed by: NURSE PRACTITIONER

## 2018-06-06 PROCEDURE — 6360000002 HC RX W HCPCS: Performed by: FAMILY MEDICINE

## 2018-06-06 PROCEDURE — 6360000002 HC RX W HCPCS: Performed by: INTERNAL MEDICINE

## 2018-06-06 PROCEDURE — 2580000003 HC RX 258: Performed by: INTERNAL MEDICINE

## 2018-06-06 PROCEDURE — 99232 SBSQ HOSP IP/OBS MODERATE 35: CPT | Performed by: INTERNAL MEDICINE

## 2018-06-06 RX ORDER — METHYLPREDNISOLONE SODIUM SUCCINATE 40 MG/ML
30 INJECTION, POWDER, LYOPHILIZED, FOR SOLUTION INTRAMUSCULAR; INTRAVENOUS EVERY 12 HOURS
Status: DISCONTINUED | OUTPATIENT
Start: 2018-06-07 | End: 2018-06-07

## 2018-06-06 RX ADMIN — AZITHROMYCIN MONOHYDRATE 250 MG: 250 TABLET ORAL at 09:20

## 2018-06-06 RX ADMIN — METHYLPREDNISOLONE SODIUM SUCCINATE 40 MG: 40 INJECTION, POWDER, FOR SOLUTION INTRAMUSCULAR; INTRAVENOUS at 12:45

## 2018-06-06 RX ADMIN — BUDESONIDE 500 MCG: 0.5 SUSPENSION RESPIRATORY (INHALATION) at 20:26

## 2018-06-06 RX ADMIN — HYDROCODONE BITARTRATE AND ACETAMINOPHEN 1 TABLET: 5; 325 TABLET ORAL at 03:37

## 2018-06-06 RX ADMIN — Medication 10 ML: at 09:26

## 2018-06-06 RX ADMIN — ALBUTEROL SULFATE 2.5 MG: 2.5 SOLUTION RESPIRATORY (INHALATION) at 15:46

## 2018-06-06 RX ADMIN — ALBUTEROL SULFATE 2.5 MG: 2.5 SOLUTION RESPIRATORY (INHALATION) at 11:39

## 2018-06-06 RX ADMIN — FERROUS SULFATE TAB EC 325 MG (65 MG FE EQUIVALENT) 325 MG: 325 (65 FE) TABLET DELAYED RESPONSE at 17:06

## 2018-06-06 RX ADMIN — PANTOPRAZOLE SODIUM 40 MG: 40 TABLET, DELAYED RELEASE ORAL at 17:07

## 2018-06-06 RX ADMIN — LINAGLIPTIN 5 MG: 5 TABLET, FILM COATED ORAL at 09:20

## 2018-06-06 RX ADMIN — INSULIN LISPRO 9 UNITS: 100 INJECTION, SOLUTION INTRAVENOUS; SUBCUTANEOUS at 17:07

## 2018-06-06 RX ADMIN — CARBIDOPA AND LEVODOPA 1 TABLET: 25; 100 TABLET, EXTENDED RELEASE ORAL at 12:42

## 2018-06-06 RX ADMIN — HEPARIN SODIUM 5000 UNITS: 5000 INJECTION, SOLUTION INTRAVENOUS; SUBCUTANEOUS at 14:31

## 2018-06-06 RX ADMIN — ANTACID TABLETS 500 MG: 500 TABLET, CHEWABLE ORAL at 09:20

## 2018-06-06 RX ADMIN — INSULIN LISPRO 6 UNITS: 100 INJECTION, SOLUTION INTRAVENOUS; SUBCUTANEOUS at 22:32

## 2018-06-06 RX ADMIN — ALBUTEROL SULFATE 2.5 MG: 2.5 SOLUTION RESPIRATORY (INHALATION) at 23:48

## 2018-06-06 RX ADMIN — ALPRAZOLAM 0.25 MG: 0.25 TABLET ORAL at 05:10

## 2018-06-06 RX ADMIN — SENNA 17.2 MG: 8.6 TABLET, COATED ORAL at 09:34

## 2018-06-06 RX ADMIN — NYSTATIN: 100000 CREAM TOPICAL at 22:36

## 2018-06-06 RX ADMIN — ANTACID TABLETS 500 MG: 500 TABLET, CHEWABLE ORAL at 22:34

## 2018-06-06 RX ADMIN — ROPINIROLE HYDROCHLORIDE 2 MG: 2 TABLET, FILM COATED ORAL at 09:20

## 2018-06-06 RX ADMIN — HYDROCODONE BITARTRATE AND ACETAMINOPHEN 1 TABLET: 5; 325 TABLET ORAL at 22:33

## 2018-06-06 RX ADMIN — AZTREONAM 1 G: 1 INJECTION, POWDER, LYOPHILIZED, FOR SOLUTION INTRAMUSCULAR; INTRAVENOUS at 20:00

## 2018-06-06 RX ADMIN — HEPARIN SODIUM 5000 UNITS: 5000 INJECTION, SOLUTION INTRAVENOUS; SUBCUTANEOUS at 22:32

## 2018-06-06 RX ADMIN — CYANOCOBALAMIN TAB 1000 MCG 1000 MCG: 1000 TAB at 09:20

## 2018-06-06 RX ADMIN — RASAGILINE MESYLATE 1 MG: 0.5 TABLET ORAL at 09:34

## 2018-06-06 RX ADMIN — METOPROLOL TARTRATE 12.5 MG: 25 TABLET, FILM COATED ORAL at 22:34

## 2018-06-06 RX ADMIN — METOPROLOL TARTRATE 12.5 MG: 25 TABLET, FILM COATED ORAL at 09:19

## 2018-06-06 RX ADMIN — CALCITRIOL 0.25 MCG: 0.25 CAPSULE, LIQUID FILLED ORAL at 14:31

## 2018-06-06 RX ADMIN — CARBIDOPA AND LEVODOPA 1 TABLET: 25; 100 TABLET, EXTENDED RELEASE ORAL at 22:34

## 2018-06-06 RX ADMIN — PANTOPRAZOLE SODIUM 40 MG: 40 TABLET, DELAYED RELEASE ORAL at 05:10

## 2018-06-06 RX ADMIN — ALBUTEROL SULFATE 2.5 MG: 2.5 SOLUTION RESPIRATORY (INHALATION) at 07:46

## 2018-06-06 RX ADMIN — ROPINIROLE HYDROCHLORIDE 2 MG: 2 TABLET, FILM COATED ORAL at 22:33

## 2018-06-06 RX ADMIN — HEPARIN SODIUM 5000 UNITS: 5000 INJECTION, SOLUTION INTRAVENOUS; SUBCUTANEOUS at 05:11

## 2018-06-06 RX ADMIN — BUDESONIDE 500 MCG: 0.5 SUSPENSION RESPIRATORY (INHALATION) at 07:46

## 2018-06-06 RX ADMIN — CARBIDOPA AND LEVODOPA 1 TABLET: 25; 100 TABLET, EXTENDED RELEASE ORAL at 17:06

## 2018-06-06 RX ADMIN — ALPRAZOLAM 0.25 MG: 0.25 TABLET ORAL at 22:34

## 2018-06-06 RX ADMIN — METHYLPREDNISOLONE SODIUM SUCCINATE 40 MG: 40 INJECTION, POWDER, FOR SOLUTION INTRAMUSCULAR; INTRAVENOUS at 03:27

## 2018-06-06 RX ADMIN — Medication 10 ML: at 22:33

## 2018-06-06 RX ADMIN — CALCITRIOL 0.25 MCG: 0.25 CAPSULE, LIQUID FILLED ORAL at 09:20

## 2018-06-06 RX ADMIN — FERROUS SULFATE TAB EC 325 MG (65 MG FE EQUIVALENT) 325 MG: 325 (65 FE) TABLET DELAYED RESPONSE at 09:20

## 2018-06-06 RX ADMIN — ALPRAZOLAM 0.25 MG: 0.25 TABLET ORAL at 14:31

## 2018-06-06 RX ADMIN — CARBIDOPA AND LEVODOPA 1 TABLET: 25; 100 TABLET, EXTENDED RELEASE ORAL at 09:20

## 2018-06-06 RX ADMIN — AZTREONAM 1 G: 1 INJECTION, POWDER, LYOPHILIZED, FOR SOLUTION INTRAMUSCULAR; INTRAVENOUS at 12:41

## 2018-06-06 RX ADMIN — Medication 400 MG: at 09:20

## 2018-06-06 RX ADMIN — INSULIN LISPRO 10 UNITS: 100 INJECTION, SOLUTION INTRAVENOUS; SUBCUTANEOUS at 09:20

## 2018-06-06 RX ADMIN — ASPIRIN 81 MG: 81 TABLET, COATED ORAL at 09:20

## 2018-06-06 RX ADMIN — AZTREONAM 1 G: 1 INJECTION, POWDER, LYOPHILIZED, FOR SOLUTION INTRAMUSCULAR; INTRAVENOUS at 03:27

## 2018-06-06 RX ADMIN — ANTACID TABLETS 500 MG: 500 TABLET, CHEWABLE ORAL at 14:31

## 2018-06-06 RX ADMIN — INSULIN LISPRO 18 UNITS: 100 INJECTION, SOLUTION INTRAVENOUS; SUBCUTANEOUS at 12:41

## 2018-06-06 RX ADMIN — ATORVASTATIN CALCIUM 20 MG: 20 TABLET, FILM COATED ORAL at 09:20

## 2018-06-06 RX ADMIN — AMIODARONE HYDROCHLORIDE 200 MG: 200 TABLET ORAL at 09:20

## 2018-06-06 RX ADMIN — CALCITRIOL 0.25 MCG: 0.25 CAPSULE, LIQUID FILLED ORAL at 22:33

## 2018-06-06 RX ADMIN — CHOLECALCIFEROL CAP 125 MCG (5000 UNIT) 5000 UNITS: 125 CAP at 09:19

## 2018-06-06 RX ADMIN — NYSTATIN: 100000 CREAM TOPICAL at 09:25

## 2018-06-06 RX ADMIN — ROPINIROLE HYDROCHLORIDE 2 MG: 2 TABLET, FILM COATED ORAL at 14:31

## 2018-06-06 RX ADMIN — ALBUTEROL SULFATE 2.5 MG: 2.5 SOLUTION RESPIRATORY (INHALATION) at 20:26

## 2018-06-06 ASSESSMENT — PAIN SCALES - GENERAL
PAINLEVEL_OUTOF10: 6
PAINLEVEL_OUTOF10: 5
PAINLEVEL_OUTOF10: 4

## 2018-06-06 ASSESSMENT — PAIN DESCRIPTION - LOCATION: LOCATION: GENERALIZED

## 2018-06-07 VITALS
SYSTOLIC BLOOD PRESSURE: 139 MMHG | RESPIRATION RATE: 17 BRPM | HEIGHT: 63 IN | DIASTOLIC BLOOD PRESSURE: 52 MMHG | BODY MASS INDEX: 32.87 KG/M2 | TEMPERATURE: 98.8 F | OXYGEN SATURATION: 98 % | HEART RATE: 73 BPM | WEIGHT: 185.5 LBS

## 2018-06-07 LAB
ANION GAP SERPL CALCULATED.3IONS-SCNC: 12 MMOL/L (ref 9–17)
BUN BLDV-MCNC: 39 MG/DL (ref 8–23)
BUN/CREAT BLD: 30 (ref 9–20)
CALCIUM SERPL-MCNC: 7.7 MG/DL (ref 8.6–10.4)
CHLORIDE BLD-SCNC: 100 MMOL/L (ref 98–107)
CO2: 29 MMOL/L (ref 20–31)
CREAT SERPL-MCNC: 1.32 MG/DL (ref 0.5–0.9)
GFR AFRICAN AMERICAN: 48 ML/MIN
GFR NON-AFRICAN AMERICAN: 40 ML/MIN
GFR SERPL CREATININE-BSD FRML MDRD: ABNORMAL ML/MIN/{1.73_M2}
GFR SERPL CREATININE-BSD FRML MDRD: ABNORMAL ML/MIN/{1.73_M2}
GLUCOSE BLD-MCNC: 270 MG/DL (ref 65–105)
GLUCOSE BLD-MCNC: 276 MG/DL (ref 65–105)
GLUCOSE BLD-MCNC: 277 MG/DL (ref 65–105)
GLUCOSE BLD-MCNC: 305 MG/DL (ref 70–99)
POTASSIUM SERPL-SCNC: 5.1 MMOL/L (ref 3.7–5.3)
SODIUM BLD-SCNC: 141 MMOL/L (ref 135–144)

## 2018-06-07 PROCEDURE — 6370000000 HC RX 637 (ALT 250 FOR IP): Performed by: INTERNAL MEDICINE

## 2018-06-07 PROCEDURE — 97535 SELF CARE MNGMENT TRAINING: CPT | Performed by: NURSE PRACTITIONER

## 2018-06-07 PROCEDURE — 2580000003 HC RX 258: Performed by: INTERNAL MEDICINE

## 2018-06-07 PROCEDURE — 82947 ASSAY GLUCOSE BLOOD QUANT: CPT

## 2018-06-07 PROCEDURE — 97530 THERAPEUTIC ACTIVITIES: CPT

## 2018-06-07 PROCEDURE — 94760 N-INVAS EAR/PLS OXIMETRY 1: CPT

## 2018-06-07 PROCEDURE — 36415 COLL VENOUS BLD VENIPUNCTURE: CPT

## 2018-06-07 PROCEDURE — 94640 AIRWAY INHALATION TREATMENT: CPT

## 2018-06-07 PROCEDURE — 6360000002 HC RX W HCPCS: Performed by: INTERNAL MEDICINE

## 2018-06-07 PROCEDURE — 2700000000 HC OXYGEN THERAPY PER DAY

## 2018-06-07 PROCEDURE — 97530 THERAPEUTIC ACTIVITIES: CPT | Performed by: NURSE PRACTITIONER

## 2018-06-07 PROCEDURE — 6370000000 HC RX 637 (ALT 250 FOR IP): Performed by: NURSE PRACTITIONER

## 2018-06-07 PROCEDURE — 6370000000 HC RX 637 (ALT 250 FOR IP): Performed by: FAMILY MEDICINE

## 2018-06-07 PROCEDURE — 2580000003 HC RX 258: Performed by: NURSE PRACTITIONER

## 2018-06-07 PROCEDURE — 2500000003 HC RX 250 WO HCPCS: Performed by: INTERNAL MEDICINE

## 2018-06-07 PROCEDURE — 97116 GAIT TRAINING THERAPY: CPT

## 2018-06-07 PROCEDURE — 97110 THERAPEUTIC EXERCISES: CPT

## 2018-06-07 PROCEDURE — 80048 BASIC METABOLIC PNL TOTAL CA: CPT

## 2018-06-07 PROCEDURE — 94660 CPAP INITIATION&MGMT: CPT

## 2018-06-07 PROCEDURE — 6360000002 HC RX W HCPCS: Performed by: NURSE PRACTITIONER

## 2018-06-07 PROCEDURE — 99239 HOSP IP/OBS DSCHRG MGMT >30: CPT | Performed by: INTERNAL MEDICINE

## 2018-06-07 PROCEDURE — 6360000002 HC RX W HCPCS: Performed by: FAMILY MEDICINE

## 2018-06-07 RX ORDER — PREDNISONE 10 MG/1
TABLET ORAL
Qty: 30 TABLET | Refills: 0 | DISCHARGE
Start: 2018-06-07 | End: 2018-08-20

## 2018-06-07 RX ORDER — PREDNISONE 20 MG/1
20 TABLET ORAL 2 TIMES DAILY
Status: DISCONTINUED | OUTPATIENT
Start: 2018-06-07 | End: 2018-06-07 | Stop reason: HOSPADM

## 2018-06-07 RX ORDER — ALBUTEROL SULFATE 2.5 MG/3ML
2.5 SOLUTION RESPIRATORY (INHALATION)
Status: DISCONTINUED | OUTPATIENT
Start: 2018-06-07 | End: 2018-06-07 | Stop reason: HOSPADM

## 2018-06-07 RX ORDER — BUTALBITAL, ACETAMINOPHEN AND CAFFEINE 50; 325; 40 MG/1; MG/1; MG/1
1 TABLET ORAL EVERY 4 HOURS PRN
Qty: 15 TABLET | Refills: 0 | Status: ON HOLD | OUTPATIENT
Start: 2018-06-07 | End: 2018-10-06 | Stop reason: ALTCHOICE

## 2018-06-07 RX ORDER — FUROSEMIDE 20 MG/1
20 TABLET ORAL DAILY
Status: DISCONTINUED | OUTPATIENT
Start: 2018-06-07 | End: 2018-06-07 | Stop reason: HOSPADM

## 2018-06-07 RX ORDER — FUROSEMIDE 20 MG/1
20 TABLET ORAL DAILY
Qty: 60 TABLET | Refills: 3 | Status: ON HOLD | DISCHARGE
Start: 2018-06-08 | End: 2018-08-22

## 2018-06-07 RX ORDER — NYSTATIN 100000 U/G
CREAM TOPICAL
Status: ON HOLD | DISCHARGE
Start: 2018-06-07 | End: 2018-09-24

## 2018-06-07 RX ORDER — HYDROCODONE BITARTRATE AND ACETAMINOPHEN 5; 325 MG/1; MG/1
1 TABLET ORAL EVERY 4 HOURS PRN
Qty: 10 TABLET | Refills: 0 | Status: ON HOLD | OUTPATIENT
Start: 2018-06-07 | End: 2018-06-12 | Stop reason: HOSPADM

## 2018-06-07 RX ORDER — ALPRAZOLAM 0.25 MG/1
0.25 TABLET ORAL 3 TIMES DAILY PRN
Qty: 10 TABLET | Refills: 0 | Status: ON HOLD | OUTPATIENT
Start: 2018-06-07 | End: 2018-06-12

## 2018-06-07 RX ADMIN — CARBIDOPA AND LEVODOPA 1 TABLET: 25; 100 TABLET, EXTENDED RELEASE ORAL at 08:18

## 2018-06-07 RX ADMIN — CARBIDOPA AND LEVODOPA 1 TABLET: 25; 100 TABLET, EXTENDED RELEASE ORAL at 12:48

## 2018-06-07 RX ADMIN — CALCITRIOL 0.25 MCG: 0.25 CAPSULE, LIQUID FILLED ORAL at 14:37

## 2018-06-07 RX ADMIN — AZTREONAM 1 G: 1 INJECTION, POWDER, LYOPHILIZED, FOR SOLUTION INTRAMUSCULAR; INTRAVENOUS at 04:00

## 2018-06-07 RX ADMIN — ALBUTEROL SULFATE 2.5 MG: 2.5 SOLUTION RESPIRATORY (INHALATION) at 03:43

## 2018-06-07 RX ADMIN — METHYLPREDNISOLONE SODIUM SUCCINATE 30 MG: 40 INJECTION, POWDER, FOR SOLUTION INTRAMUSCULAR; INTRAVENOUS at 00:00

## 2018-06-07 RX ADMIN — ALPRAZOLAM 0.25 MG: 0.25 TABLET ORAL at 04:58

## 2018-06-07 RX ADMIN — FERROUS SULFATE TAB EC 325 MG (65 MG FE EQUIVALENT) 325 MG: 325 (65 FE) TABLET DELAYED RESPONSE at 17:44

## 2018-06-07 RX ADMIN — CYANOCOBALAMIN TAB 1000 MCG 1000 MCG: 1000 TAB at 08:18

## 2018-06-07 RX ADMIN — ANTACID TABLETS 500 MG: 500 TABLET, CHEWABLE ORAL at 08:18

## 2018-06-07 RX ADMIN — FUROSEMIDE 20 MG: 20 TABLET ORAL at 14:37

## 2018-06-07 RX ADMIN — HEPARIN SODIUM 5000 UNITS: 5000 INJECTION, SOLUTION INTRAVENOUS; SUBCUTANEOUS at 04:48

## 2018-06-07 RX ADMIN — CALCITRIOL 0.25 MCG: 0.25 CAPSULE, LIQUID FILLED ORAL at 08:18

## 2018-06-07 RX ADMIN — PANTOPRAZOLE SODIUM 40 MG: 40 TABLET, DELAYED RELEASE ORAL at 06:04

## 2018-06-07 RX ADMIN — HYDROCODONE BITARTRATE AND ACETAMINOPHEN 1 TABLET: 5; 325 TABLET ORAL at 08:41

## 2018-06-07 RX ADMIN — CHOLECALCIFEROL CAP 125 MCG (5000 UNIT) 5000 UNITS: 125 CAP at 08:17

## 2018-06-07 RX ADMIN — ATORVASTATIN CALCIUM 20 MG: 20 TABLET, FILM COATED ORAL at 08:18

## 2018-06-07 RX ADMIN — METOPROLOL TARTRATE 12.5 MG: 25 TABLET, FILM COATED ORAL at 08:17

## 2018-06-07 RX ADMIN — SENNA 17.2 MG: 8.6 TABLET, COATED ORAL at 08:17

## 2018-06-07 RX ADMIN — NYSTATIN: 100000 CREAM TOPICAL at 08:18

## 2018-06-07 RX ADMIN — PREDNISONE 20 MG: 20 TABLET ORAL at 10:27

## 2018-06-07 RX ADMIN — ROPINIROLE HYDROCHLORIDE 2 MG: 2 TABLET, FILM COATED ORAL at 08:17

## 2018-06-07 RX ADMIN — FERROUS SULFATE TAB EC 325 MG (65 MG FE EQUIVALENT) 325 MG: 325 (65 FE) TABLET DELAYED RESPONSE at 08:18

## 2018-06-07 RX ADMIN — INSULIN LISPRO 9 UNITS: 100 INJECTION, SOLUTION INTRAVENOUS; SUBCUTANEOUS at 08:19

## 2018-06-07 RX ADMIN — LINAGLIPTIN 5 MG: 5 TABLET, FILM COATED ORAL at 08:18

## 2018-06-07 RX ADMIN — HEPARIN SODIUM 5000 UNITS: 5000 INJECTION, SOLUTION INTRAVENOUS; SUBCUTANEOUS at 14:37

## 2018-06-07 RX ADMIN — AMIODARONE HYDROCHLORIDE 200 MG: 200 TABLET ORAL at 08:18

## 2018-06-07 RX ADMIN — Medication 400 MG: at 08:18

## 2018-06-07 RX ADMIN — BUDESONIDE 500 MCG: 0.5 SUSPENSION RESPIRATORY (INHALATION) at 07:57

## 2018-06-07 RX ADMIN — ASPIRIN 81 MG: 81 TABLET, COATED ORAL at 08:17

## 2018-06-07 RX ADMIN — INSULIN LISPRO 9 UNITS: 100 INJECTION, SOLUTION INTRAVENOUS; SUBCUTANEOUS at 17:44

## 2018-06-07 RX ADMIN — AZITHROMYCIN MONOHYDRATE 250 MG: 250 TABLET ORAL at 08:18

## 2018-06-07 RX ADMIN — ALBUTEROL SULFATE 2.5 MG: 2.5 SOLUTION RESPIRATORY (INHALATION) at 07:57

## 2018-06-07 RX ADMIN — ROPINIROLE HYDROCHLORIDE 2 MG: 2 TABLET, FILM COATED ORAL at 14:37

## 2018-06-07 RX ADMIN — CARBIDOPA AND LEVODOPA 1 TABLET: 25; 100 TABLET, EXTENDED RELEASE ORAL at 17:44

## 2018-06-07 RX ADMIN — INSULIN LISPRO 9 UNITS: 100 INJECTION, SOLUTION INTRAVENOUS; SUBCUTANEOUS at 12:48

## 2018-06-07 RX ADMIN — ALPRAZOLAM 0.25 MG: 0.25 TABLET ORAL at 14:44

## 2018-06-07 RX ADMIN — RASAGILINE MESYLATE 1 MG: 0.5 TABLET ORAL at 08:41

## 2018-06-07 RX ADMIN — Medication 10 ML: at 08:41

## 2018-06-07 ASSESSMENT — PAIN DESCRIPTION - ORIENTATION: ORIENTATION: RIGHT;LEFT

## 2018-06-07 ASSESSMENT — PAIN DESCRIPTION - LOCATION: LOCATION: KNEE

## 2018-06-07 ASSESSMENT — PAIN DESCRIPTION - PAIN TYPE: TYPE: SURGICAL PAIN

## 2018-06-07 ASSESSMENT — PAIN SCALES - GENERAL
PAINLEVEL_OUTOF10: 10
PAINLEVEL_OUTOF10: 7
PAINLEVEL_OUTOF10: 4
PAINLEVEL_OUTOF10: 4

## 2018-06-08 ENCOUNTER — APPOINTMENT (OUTPATIENT)
Dept: GENERAL RADIOLOGY | Age: 73
DRG: 309 | End: 2018-06-08
Payer: COMMERCIAL

## 2018-06-08 ENCOUNTER — HOSPITAL ENCOUNTER (INPATIENT)
Age: 73
LOS: 4 days | Discharge: NURSING, REHAB OR LONG-TERM CARE | DRG: 309 | End: 2018-06-12
Attending: EMERGENCY MEDICINE | Admitting: INTERNAL MEDICINE
Payer: COMMERCIAL

## 2018-06-08 DIAGNOSIS — I48.91 ATRIAL FIBRILLATION WITH RAPID VENTRICULAR RESPONSE (HCC): Primary | ICD-10-CM

## 2018-06-08 DIAGNOSIS — M48.02 SPINAL STENOSIS IN CERVICAL REGION: ICD-10-CM

## 2018-06-08 DIAGNOSIS — R79.9 ELEVATED BUN: ICD-10-CM

## 2018-06-08 DIAGNOSIS — R73.9 HYPERGLYCEMIA: ICD-10-CM

## 2018-06-08 DIAGNOSIS — J98.8 RESPIRATORY INFECTION: ICD-10-CM

## 2018-06-08 DIAGNOSIS — R79.89 ELEVATED BRAIN NATRIURETIC PEPTIDE (BNP) LEVEL: ICD-10-CM

## 2018-06-08 DIAGNOSIS — F41.9 ANXIETY: ICD-10-CM

## 2018-06-08 DIAGNOSIS — I50.9 ACUTE ON CHRONIC CONGESTIVE HEART FAILURE, UNSPECIFIED CONGESTIVE HEART FAILURE TYPE: ICD-10-CM

## 2018-06-08 DIAGNOSIS — R79.89 ELEVATED SERUM CREATININE: ICD-10-CM

## 2018-06-08 LAB
ABSOLUTE BANDS #: 0.72 K/UL (ref 0–1)
ABSOLUTE EOS #: 0 K/UL (ref 0–0.4)
ABSOLUTE IMMATURE GRANULOCYTE: ABNORMAL K/UL (ref 0–0.3)
ABSOLUTE LYMPH #: 0.48 K/UL (ref 1–4.8)
ABSOLUTE MONO #: 0.72 K/UL (ref 0.1–1.3)
ANION GAP SERPL CALCULATED.3IONS-SCNC: 11 MMOL/L (ref 9–17)
BANDS: 3 % (ref 0–10)
BASOPHILS # BLD: 0 % (ref 0–2)
BASOPHILS ABSOLUTE: 0 K/UL (ref 0–0.2)
BNP INTERPRETATION: ABNORMAL
BUN BLDV-MCNC: 38 MG/DL (ref 8–23)
BUN/CREAT BLD: ABNORMAL (ref 9–20)
CALCIUM SERPL-MCNC: 8.5 MG/DL (ref 8.6–10.4)
CHLORIDE BLD-SCNC: 95 MMOL/L (ref 98–107)
CO2: 34 MMOL/L (ref 20–31)
CREAT SERPL-MCNC: 1.22 MG/DL (ref 0.5–0.9)
DIFFERENTIAL TYPE: ABNORMAL
EKG ATRIAL RATE: 163 BPM
EKG Q-T INTERVAL: 282 MS
EKG QRS DURATION: 84 MS
EKG QTC CALCULATION (BAZETT): 445 MS
EKG R AXIS: -17 DEGREES
EKG T AXIS: 73 DEGREES
EKG VENTRICULAR RATE: 150 BPM
EOSINOPHILS RELATIVE PERCENT: 0 % (ref 0–4)
GFR AFRICAN AMERICAN: 53 ML/MIN
GFR NON-AFRICAN AMERICAN: 44 ML/MIN
GFR SERPL CREATININE-BSD FRML MDRD: ABNORMAL ML/MIN/{1.73_M2}
GFR SERPL CREATININE-BSD FRML MDRD: ABNORMAL ML/MIN/{1.73_M2}
GLUCOSE BLD-MCNC: 319 MG/DL (ref 65–105)
GLUCOSE BLD-MCNC: 331 MG/DL
GLUCOSE BLD-MCNC: 331 MG/DL (ref 65–105)
GLUCOSE BLD-MCNC: 403 MG/DL
GLUCOSE BLD-MCNC: 403 MG/DL (ref 65–105)
GLUCOSE BLD-MCNC: 427 MG/DL (ref 70–99)
HCT VFR BLD CALC: 30.6 % (ref 36–46)
HEMOGLOBIN: 9.7 G/DL (ref 12–16)
IMMATURE GRANULOCYTES: ABNORMAL %
INR BLD: 1
LACTIC ACID: 1.4 MMOL/L (ref 0.5–2.2)
LYMPHOCYTES # BLD: 2 % (ref 24–44)
MCH RBC QN AUTO: 31.6 PG (ref 26–34)
MCHC RBC AUTO-ENTMCNC: 31.7 G/DL (ref 31–37)
MCV RBC AUTO: 99.5 FL (ref 80–100)
MONOCYTES # BLD: 3 % (ref 1–7)
MORPHOLOGY: ABNORMAL
NRBC AUTOMATED: ABNORMAL PER 100 WBC
PARTIAL THROMBOPLASTIN TIME: 23.4 SEC (ref 23–31)
PDW BLD-RTO: 17.5 % (ref 11.5–14.9)
PLATELET # BLD: 238 K/UL (ref 150–450)
PLATELET ESTIMATE: ABNORMAL
PMV BLD AUTO: 8.3 FL (ref 6–12)
POTASSIUM SERPL-SCNC: 4.7 MMOL/L (ref 3.7–5.3)
PRO-BNP: 2687 PG/ML
PROTHROMBIN TIME: 10.2 SEC (ref 9.7–12)
RBC # BLD: 3.07 M/UL (ref 4–5.2)
RBC # BLD: ABNORMAL 10*6/UL
SEG NEUTROPHILS: 92 % (ref 36–66)
SEGMENTED NEUTROPHILS ABSOLUTE COUNT: 22.18 K/UL (ref 1.3–9.1)
SODIUM BLD-SCNC: 140 MMOL/L (ref 135–144)
TROPONIN INTERP: NORMAL
TROPONIN INTERP: NORMAL
TROPONIN T: <0.03 NG/ML
TROPONIN T: <0.03 NG/ML
WBC # BLD: 24.1 K/UL (ref 3.5–11)
WBC # BLD: ABNORMAL 10*3/UL

## 2018-06-08 PROCEDURE — 2060000000 HC ICU INTERMEDIATE R&B

## 2018-06-08 PROCEDURE — 36415 COLL VENOUS BLD VENIPUNCTURE: CPT

## 2018-06-08 PROCEDURE — 96375 TX/PRO/DX INJ NEW DRUG ADDON: CPT

## 2018-06-08 PROCEDURE — 83605 ASSAY OF LACTIC ACID: CPT

## 2018-06-08 PROCEDURE — 6370000000 HC RX 637 (ALT 250 FOR IP): Performed by: INTERNAL MEDICINE

## 2018-06-08 PROCEDURE — 96372 THER/PROPH/DIAG INJ SC/IM: CPT

## 2018-06-08 PROCEDURE — 85025 COMPLETE CBC W/AUTO DIFF WBC: CPT

## 2018-06-08 PROCEDURE — 71046 X-RAY EXAM CHEST 2 VIEWS: CPT

## 2018-06-08 PROCEDURE — 96365 THER/PROPH/DIAG IV INF INIT: CPT

## 2018-06-08 PROCEDURE — 94762 N-INVAS EAR/PLS OXIMTRY CONT: CPT

## 2018-06-08 PROCEDURE — 80048 BASIC METABOLIC PNL TOTAL CA: CPT

## 2018-06-08 PROCEDURE — 94640 AIRWAY INHALATION TREATMENT: CPT

## 2018-06-08 PROCEDURE — 85610 PROTHROMBIN TIME: CPT

## 2018-06-08 PROCEDURE — 2500000003 HC RX 250 WO HCPCS: Performed by: EMERGENCY MEDICINE

## 2018-06-08 PROCEDURE — 93005 ELECTROCARDIOGRAM TRACING: CPT

## 2018-06-08 PROCEDURE — 6370000000 HC RX 637 (ALT 250 FOR IP): Performed by: EMERGENCY MEDICINE

## 2018-06-08 PROCEDURE — 87040 BLOOD CULTURE FOR BACTERIA: CPT

## 2018-06-08 PROCEDURE — 6360000002 HC RX W HCPCS: Performed by: EMERGENCY MEDICINE

## 2018-06-08 PROCEDURE — 94664 DEMO&/EVAL PT USE INHALER: CPT

## 2018-06-08 PROCEDURE — 99285 EMERGENCY DEPT VISIT HI MDM: CPT

## 2018-06-08 PROCEDURE — 2500000003 HC RX 250 WO HCPCS: Performed by: INTERNAL MEDICINE

## 2018-06-08 PROCEDURE — 82947 ASSAY GLUCOSE BLOOD QUANT: CPT

## 2018-06-08 PROCEDURE — 85730 THROMBOPLASTIN TIME PARTIAL: CPT

## 2018-06-08 PROCEDURE — 2580000003 HC RX 258: Performed by: EMERGENCY MEDICINE

## 2018-06-08 PROCEDURE — 84484 ASSAY OF TROPONIN QUANT: CPT

## 2018-06-08 PROCEDURE — 83880 ASSAY OF NATRIURETIC PEPTIDE: CPT

## 2018-06-08 RX ORDER — ROPINIROLE 1 MG/1
2 TABLET, FILM COATED ORAL ONCE
Status: DISCONTINUED | OUTPATIENT
Start: 2018-06-08 | End: 2018-06-12 | Stop reason: HOSPADM

## 2018-06-08 RX ORDER — ALPRAZOLAM 0.25 MG/1
0.25 TABLET ORAL ONCE
Status: COMPLETED | OUTPATIENT
Start: 2018-06-08 | End: 2018-06-09

## 2018-06-08 RX ORDER — DILTIAZEM HYDROCHLORIDE 5 MG/ML
20 INJECTION INTRAVENOUS ONCE
Status: COMPLETED | OUTPATIENT
Start: 2018-06-08 | End: 2018-06-08

## 2018-06-08 RX ORDER — CIPROFLOXACIN 2 MG/ML
400 INJECTION, SOLUTION INTRAVENOUS ONCE
Status: COMPLETED | OUTPATIENT
Start: 2018-06-08 | End: 2018-06-08

## 2018-06-08 RX ORDER — FUROSEMIDE 10 MG/ML
40 INJECTION INTRAMUSCULAR; INTRAVENOUS ONCE
Status: COMPLETED | OUTPATIENT
Start: 2018-06-08 | End: 2018-06-08

## 2018-06-08 RX ORDER — NICOTINE POLACRILEX 4 MG
15 LOZENGE BUCCAL PRN
Status: DISCONTINUED | OUTPATIENT
Start: 2018-06-08 | End: 2018-06-12 | Stop reason: HOSPADM

## 2018-06-08 RX ORDER — DEXTROSE MONOHYDRATE 50 MG/ML
100 INJECTION, SOLUTION INTRAVENOUS PRN
Status: DISCONTINUED | OUTPATIENT
Start: 2018-06-08 | End: 2018-06-12 | Stop reason: HOSPADM

## 2018-06-08 RX ORDER — ASPIRIN 81 MG/1
324 TABLET, CHEWABLE ORAL ONCE
Status: COMPLETED | OUTPATIENT
Start: 2018-06-08 | End: 2018-06-08

## 2018-06-08 RX ORDER — SODIUM CHLORIDE 0.9 % (FLUSH) 0.9 %
10 SYRINGE (ML) INJECTION PRN
Status: DISCONTINUED | OUTPATIENT
Start: 2018-06-08 | End: 2018-06-12 | Stop reason: HOSPADM

## 2018-06-08 RX ORDER — IPRATROPIUM BROMIDE AND ALBUTEROL SULFATE 2.5; .5 MG/3ML; MG/3ML
1 SOLUTION RESPIRATORY (INHALATION)
Status: DISCONTINUED | OUTPATIENT
Start: 2018-06-08 | End: 2018-06-08

## 2018-06-08 RX ORDER — ALBUTEROL SULFATE 90 UG/1
2 AEROSOL, METERED RESPIRATORY (INHALATION)
Status: DISCONTINUED | OUTPATIENT
Start: 2018-06-08 | End: 2018-06-08

## 2018-06-08 RX ORDER — ACETAMINOPHEN 325 MG/1
650 TABLET ORAL EVERY 4 HOURS PRN
Status: DISCONTINUED | OUTPATIENT
Start: 2018-06-08 | End: 2018-06-12 | Stop reason: HOSPADM

## 2018-06-08 RX ORDER — SODIUM CHLORIDE FOR INHALATION 0.9 %
3 VIAL, NEBULIZER (ML) INHALATION EVERY 8 HOURS PRN
Status: DISCONTINUED | OUTPATIENT
Start: 2018-06-08 | End: 2018-06-08

## 2018-06-08 RX ORDER — DEXTROSE MONOHYDRATE 25 G/50ML
12.5 INJECTION, SOLUTION INTRAVENOUS PRN
Status: DISCONTINUED | OUTPATIENT
Start: 2018-06-08 | End: 2018-06-12 | Stop reason: HOSPADM

## 2018-06-08 RX ORDER — METOPROLOL TARTRATE 5 MG/5ML
5 INJECTION INTRAVENOUS ONCE
Status: DISCONTINUED | OUTPATIENT
Start: 2018-06-08 | End: 2018-06-08

## 2018-06-08 RX ORDER — ALBUTEROL SULFATE 2.5 MG/3ML
5 SOLUTION RESPIRATORY (INHALATION)
Status: DISCONTINUED | OUTPATIENT
Start: 2018-06-08 | End: 2018-06-08

## 2018-06-08 RX ORDER — SODIUM CHLORIDE 0.9 % (FLUSH) 0.9 %
10 SYRINGE (ML) INJECTION EVERY 12 HOURS SCHEDULED
Status: DISCONTINUED | OUTPATIENT
Start: 2018-06-08 | End: 2018-06-12 | Stop reason: HOSPADM

## 2018-06-08 RX ORDER — LEVALBUTEROL 1.25 MG/.5ML
1.25 SOLUTION, CONCENTRATE RESPIRATORY (INHALATION) EVERY 8 HOURS PRN
Status: DISCONTINUED | OUTPATIENT
Start: 2018-06-08 | End: 2018-06-08

## 2018-06-08 RX ORDER — HYDROCODONE BITARTRATE AND ACETAMINOPHEN 5; 325 MG/1; MG/1
1 TABLET ORAL ONCE
Status: COMPLETED | OUTPATIENT
Start: 2018-06-08 | End: 2018-06-09

## 2018-06-08 RX ADMIN — LEVALBUTEROL 1.25 MG: 1.25 SOLUTION, CONCENTRATE RESPIRATORY (INHALATION) at 19:28

## 2018-06-08 RX ADMIN — INSULIN LISPRO 10 UNITS: 100 INJECTION, SOLUTION INTRAVENOUS; SUBCUTANEOUS at 19:41

## 2018-06-08 RX ADMIN — MICONAZOLE NITRATE: 20.6 POWDER TOPICAL at 23:57

## 2018-06-08 RX ADMIN — MEROPENEM 1 G: 1 INJECTION, POWDER, FOR SOLUTION INTRAVENOUS at 21:03

## 2018-06-08 RX ADMIN — LEVALBUTEROL 1.25 MG: 1.25 SOLUTION, CONCENTRATE RESPIRATORY (INHALATION) at 19:23

## 2018-06-08 RX ADMIN — CARBIDOPA AND LEVODOPA 1 TABLET: 25; 100 TABLET ORAL at 23:57

## 2018-06-08 RX ADMIN — ENOXAPARIN SODIUM 80 MG: 80 INJECTION SUBCUTANEOUS at 22:34

## 2018-06-08 RX ADMIN — DILTIAZEM HYDROCHLORIDE 20 MG: 5 INJECTION INTRAVENOUS at 19:47

## 2018-06-08 RX ADMIN — VANCOMYCIN HYDROCHLORIDE 1250 MG: 10 INJECTION, POWDER, LYOPHILIZED, FOR SOLUTION INTRAVENOUS at 21:36

## 2018-06-08 RX ADMIN — INSULIN LISPRO 6 UNITS: 100 INJECTION, SOLUTION INTRAVENOUS; SUBCUTANEOUS at 23:57

## 2018-06-08 RX ADMIN — CIPROFLOXACIN 400 MG: 2 INJECTION, SOLUTION INTRAVENOUS at 22:34

## 2018-06-08 RX ADMIN — DILTIAZEM HYDROCHLORIDE 5 MG/HR: 5 INJECTION INTRAVENOUS at 19:52

## 2018-06-08 RX ADMIN — ASPIRIN 81 MG 324 MG: 81 TABLET ORAL at 19:15

## 2018-06-08 RX ADMIN — FUROSEMIDE 40 MG: 10 INJECTION, SOLUTION INTRAMUSCULAR; INTRAVENOUS at 19:40

## 2018-06-08 ASSESSMENT — ENCOUNTER SYMPTOMS
VOMITING: 0
BACK PAIN: 0
DIARRHEA: 0
NAUSEA: 0
SORE THROAT: 0
ABDOMINAL PAIN: 0
COUGH: 0
EYE PAIN: 0
SHORTNESS OF BREATH: 0

## 2018-06-08 ASSESSMENT — PAIN DESCRIPTION - PAIN TYPE
TYPE: ACUTE PAIN
TYPE: ACUTE PAIN

## 2018-06-08 ASSESSMENT — PAIN DESCRIPTION - LOCATION
LOCATION: CHEST
LOCATION: CHEST

## 2018-06-08 ASSESSMENT — PAIN DESCRIPTION - ORIENTATION
ORIENTATION: MID
ORIENTATION: MID

## 2018-06-08 ASSESSMENT — PAIN DESCRIPTION - DESCRIPTORS
DESCRIPTORS: STABBING
DESCRIPTORS: STABBING

## 2018-06-08 ASSESSMENT — PAIN SCALES - GENERAL
PAINLEVEL_OUTOF10: 10
PAINLEVEL_OUTOF10: 10

## 2018-06-08 ASSESSMENT — PAIN DESCRIPTION - FREQUENCY
FREQUENCY: CONTINUOUS
FREQUENCY: CONTINUOUS

## 2018-06-09 LAB
ABSOLUTE BANDS #: 0.74 K/UL (ref 0–1)
ABSOLUTE EOS #: 0.19 K/UL (ref 0–0.4)
ABSOLUTE IMMATURE GRANULOCYTE: ABNORMAL K/UL (ref 0–0.3)
ABSOLUTE LYMPH #: 2.23 K/UL (ref 1–4.8)
ABSOLUTE MONO #: 0 K/UL (ref 0.1–1.3)
ANION GAP SERPL CALCULATED.3IONS-SCNC: 8 MMOL/L (ref 9–17)
BANDS: 4 % (ref 0–10)
BASOPHILS # BLD: 0 % (ref 0–2)
BASOPHILS ABSOLUTE: 0 K/UL (ref 0–0.2)
BUN BLDV-MCNC: 31 MG/DL (ref 8–23)
BUN/CREAT BLD: ABNORMAL (ref 9–20)
CALCIUM SERPL-MCNC: 8.4 MG/DL (ref 8.6–10.4)
CHLORIDE BLD-SCNC: 96 MMOL/L (ref 98–107)
CO2: 39 MMOL/L (ref 20–31)
CREAT SERPL-MCNC: 1.21 MG/DL (ref 0.5–0.9)
DIFFERENTIAL TYPE: ABNORMAL
EOSINOPHILS RELATIVE PERCENT: 1 % (ref 0–4)
GFR AFRICAN AMERICAN: 53 ML/MIN
GFR NON-AFRICAN AMERICAN: 44 ML/MIN
GFR SERPL CREATININE-BSD FRML MDRD: ABNORMAL ML/MIN/{1.73_M2}
GFR SERPL CREATININE-BSD FRML MDRD: ABNORMAL ML/MIN/{1.73_M2}
GLUCOSE BLD-MCNC: 135 MG/DL (ref 65–105)
GLUCOSE BLD-MCNC: 142 MG/DL (ref 65–105)
GLUCOSE BLD-MCNC: 172 MG/DL (ref 65–105)
GLUCOSE BLD-MCNC: 180 MG/DL (ref 70–99)
GLUCOSE BLD-MCNC: 228 MG/DL (ref 65–105)
HCT VFR BLD CALC: 29.9 % (ref 36–46)
HEMOGLOBIN: 9.6 G/DL (ref 12–16)
IMMATURE GRANULOCYTES: ABNORMAL %
LYMPHOCYTES # BLD: 12 % (ref 24–44)
MCH RBC QN AUTO: 31.6 PG (ref 26–34)
MCHC RBC AUTO-ENTMCNC: 32 G/DL (ref 31–37)
MCV RBC AUTO: 98.6 FL (ref 80–100)
MONOCYTES # BLD: 0 % (ref 1–7)
MORPHOLOGY: ABNORMAL
NRBC AUTOMATED: ABNORMAL PER 100 WBC
PDW BLD-RTO: 17.6 % (ref 11.5–14.9)
PLATELET # BLD: 192 K/UL (ref 150–450)
PLATELET ESTIMATE: ABNORMAL
PMV BLD AUTO: 8 FL (ref 6–12)
POTASSIUM SERPL-SCNC: 3.7 MMOL/L (ref 3.7–5.3)
PROCALCITONIN: 0.16 NG/ML
RBC # BLD: 3.04 M/UL (ref 4–5.2)
RBC # BLD: ABNORMAL 10*6/UL
SEG NEUTROPHILS: 83 % (ref 36–66)
SEGMENTED NEUTROPHILS ABSOLUTE COUNT: 15.44 K/UL (ref 1.3–9.1)
SODIUM BLD-SCNC: 143 MMOL/L (ref 135–144)
WBC # BLD: 18.6 K/UL (ref 3.5–11)
WBC # BLD: ABNORMAL 10*3/UL

## 2018-06-09 PROCEDURE — 36415 COLL VENOUS BLD VENIPUNCTURE: CPT

## 2018-06-09 PROCEDURE — 2580000003 HC RX 258: Performed by: EMERGENCY MEDICINE

## 2018-06-09 PROCEDURE — 82947 ASSAY GLUCOSE BLOOD QUANT: CPT

## 2018-06-09 PROCEDURE — 2580000003 HC RX 258: Performed by: INTERNAL MEDICINE

## 2018-06-09 PROCEDURE — 6370000000 HC RX 637 (ALT 250 FOR IP): Performed by: INTERNAL MEDICINE

## 2018-06-09 PROCEDURE — 6370000000 HC RX 637 (ALT 250 FOR IP): Performed by: STUDENT IN AN ORGANIZED HEALTH CARE EDUCATION/TRAINING PROGRAM

## 2018-06-09 PROCEDURE — 94762 N-INVAS EAR/PLS OXIMTRY CONT: CPT

## 2018-06-09 PROCEDURE — 6360000002 HC RX W HCPCS: Performed by: INTERNAL MEDICINE

## 2018-06-09 PROCEDURE — 97166 OT EVAL MOD COMPLEX 45 MIN: CPT

## 2018-06-09 PROCEDURE — 84145 PROCALCITONIN (PCT): CPT

## 2018-06-09 PROCEDURE — G8988 SELF CARE GOAL STATUS: HCPCS

## 2018-06-09 PROCEDURE — 85025 COMPLETE CBC W/AUTO DIFF WBC: CPT

## 2018-06-09 PROCEDURE — 6360000002 HC RX W HCPCS: Performed by: EMERGENCY MEDICINE

## 2018-06-09 PROCEDURE — 97116 GAIT TRAINING THERAPY: CPT

## 2018-06-09 PROCEDURE — 2060000000 HC ICU INTERMEDIATE R&B

## 2018-06-09 PROCEDURE — G8979 MOBILITY GOAL STATUS: HCPCS

## 2018-06-09 PROCEDURE — 94760 N-INVAS EAR/PLS OXIMETRY 1: CPT

## 2018-06-09 PROCEDURE — 99223 1ST HOSP IP/OBS HIGH 75: CPT | Performed by: INTERNAL MEDICINE

## 2018-06-09 PROCEDURE — 80048 BASIC METABOLIC PNL TOTAL CA: CPT

## 2018-06-09 PROCEDURE — G8987 SELF CARE CURRENT STATUS: HCPCS

## 2018-06-09 PROCEDURE — G8978 MOBILITY CURRENT STATUS: HCPCS

## 2018-06-09 PROCEDURE — 97162 PT EVAL MOD COMPLEX 30 MIN: CPT

## 2018-06-09 RX ORDER — FUROSEMIDE 20 MG/1
20 TABLET ORAL DAILY
Status: DISCONTINUED | OUTPATIENT
Start: 2018-06-09 | End: 2018-06-12 | Stop reason: HOSPADM

## 2018-06-09 RX ORDER — AMIODARONE HYDROCHLORIDE 200 MG/1
200 TABLET ORAL DAILY
Status: DISCONTINUED | OUTPATIENT
Start: 2018-06-09 | End: 2018-06-12 | Stop reason: HOSPADM

## 2018-06-09 RX ORDER — ROPINIROLE 1 MG/1
2 TABLET, FILM COATED ORAL 3 TIMES DAILY
Status: DISCONTINUED | OUTPATIENT
Start: 2018-06-09 | End: 2018-06-12 | Stop reason: HOSPADM

## 2018-06-09 RX ORDER — ALPRAZOLAM 0.25 MG/1
0.25 TABLET ORAL 3 TIMES DAILY PRN
Status: DISCONTINUED | OUTPATIENT
Start: 2018-06-09 | End: 2018-06-12 | Stop reason: HOSPADM

## 2018-06-09 RX ORDER — CARBIDOPA AND LEVODOPA 25; 100 MG/1; MG/1
1 TABLET, EXTENDED RELEASE ORAL 4 TIMES DAILY
Status: DISCONTINUED | OUTPATIENT
Start: 2018-06-09 | End: 2018-06-12 | Stop reason: HOSPADM

## 2018-06-09 RX ORDER — IBUPROFEN 200 MG
950 CAPSULE ORAL 3 TIMES DAILY
Status: DISCONTINUED | OUTPATIENT
Start: 2018-06-09 | End: 2018-06-12 | Stop reason: HOSPADM

## 2018-06-09 RX ORDER — ALBUTEROL SULFATE 2.5 MG/3ML
2.5 SOLUTION RESPIRATORY (INHALATION) EVERY 6 HOURS PRN
Status: DISCONTINUED | OUTPATIENT
Start: 2018-06-09 | End: 2018-06-12 | Stop reason: HOSPADM

## 2018-06-09 RX ORDER — ONDANSETRON 4 MG/1
8 TABLET, FILM COATED ORAL EVERY 8 HOURS PRN
Status: DISCONTINUED | OUTPATIENT
Start: 2018-06-09 | End: 2018-06-12 | Stop reason: HOSPADM

## 2018-06-09 RX ORDER — ASPIRIN 81 MG/1
81 TABLET ORAL DAILY
Status: DISCONTINUED | OUTPATIENT
Start: 2018-06-09 | End: 2018-06-12 | Stop reason: HOSPADM

## 2018-06-09 RX ORDER — RASAGILINE 1 MG/1
1 TABLET ORAL DAILY
Status: DISCONTINUED | OUTPATIENT
Start: 2018-06-09 | End: 2018-06-12 | Stop reason: HOSPADM

## 2018-06-09 RX ORDER — ATORVASTATIN CALCIUM 20 MG/1
20 TABLET, FILM COATED ORAL DAILY
Status: DISCONTINUED | OUTPATIENT
Start: 2018-06-09 | End: 2018-06-12 | Stop reason: HOSPADM

## 2018-06-09 RX ORDER — PANTOPRAZOLE SODIUM 40 MG/1
40 TABLET, DELAYED RELEASE ORAL
Status: DISCONTINUED | OUTPATIENT
Start: 2018-06-09 | End: 2018-06-12 | Stop reason: HOSPADM

## 2018-06-09 RX ORDER — HYDROCODONE BITARTRATE AND ACETAMINOPHEN 5; 325 MG/1; MG/1
1 TABLET ORAL EVERY 4 HOURS PRN
Status: DISCONTINUED | OUTPATIENT
Start: 2018-06-09 | End: 2018-06-12 | Stop reason: HOSPADM

## 2018-06-09 RX ADMIN — HYDROCODONE BITARTRATE AND ACETAMINOPHEN 1 TABLET: 5; 325 TABLET ORAL at 00:01

## 2018-06-09 RX ADMIN — MICONAZOLE NITRATE: 20.6 POWDER TOPICAL at 09:40

## 2018-06-09 RX ADMIN — ENOXAPARIN SODIUM 80 MG: 80 INJECTION SUBCUTANEOUS at 09:40

## 2018-06-09 RX ADMIN — CARBIDOPA AND LEVODOPA 1 TABLET: 25; 100 TABLET, EXTENDED RELEASE ORAL at 13:20

## 2018-06-09 RX ADMIN — FUROSEMIDE 20 MG: 20 TABLET ORAL at 09:40

## 2018-06-09 RX ADMIN — CALCIUM CITRATE 200 MG (950 MG) TABLET 950 MG: at 21:33

## 2018-06-09 RX ADMIN — MOMETASONE FUROATE AND FORMOTEROL FUMARATE DIHYDRATE 2 PUFF: 200; 5 AEROSOL RESPIRATORY (INHALATION) at 09:41

## 2018-06-09 RX ADMIN — ALPRAZOLAM 0.25 MG: 0.25 TABLET ORAL at 21:33

## 2018-06-09 RX ADMIN — INSULIN LISPRO 3 UNITS: 100 INJECTION, SOLUTION INTRAVENOUS; SUBCUTANEOUS at 09:03

## 2018-06-09 RX ADMIN — PANTOPRAZOLE SODIUM 40 MG: 40 TABLET, DELAYED RELEASE ORAL at 16:03

## 2018-06-09 RX ADMIN — ROPINIROLE HYDROCHLORIDE 2 MG: 1 TABLET, FILM COATED ORAL at 09:40

## 2018-06-09 RX ADMIN — MEROPENEM 1 G: 1 INJECTION, POWDER, FOR SOLUTION INTRAVENOUS at 21:32

## 2018-06-09 RX ADMIN — ASPIRIN 81 MG: 81 TABLET, COATED ORAL at 09:40

## 2018-06-09 RX ADMIN — MICONAZOLE NITRATE: 20.6 POWDER TOPICAL at 21:46

## 2018-06-09 RX ADMIN — INSULIN LISPRO 6 UNITS: 100 INJECTION, SOLUTION INTRAVENOUS; SUBCUTANEOUS at 17:09

## 2018-06-09 RX ADMIN — ROPINIROLE HYDROCHLORIDE 2 MG: 1 TABLET, FILM COATED ORAL at 13:20

## 2018-06-09 RX ADMIN — Medication 10 ML: at 21:45

## 2018-06-09 RX ADMIN — CALCIUM CITRATE 200 MG (950 MG) TABLET 950 MG: at 10:04

## 2018-06-09 RX ADMIN — CARBIDOPA AND LEVODOPA 1 TABLET: 25; 100 TABLET, EXTENDED RELEASE ORAL at 17:09

## 2018-06-09 RX ADMIN — CARBIDOPA AND LEVODOPA 1 TABLET: 25; 100 TABLET, EXTENDED RELEASE ORAL at 21:33

## 2018-06-09 RX ADMIN — METOPROLOL TARTRATE 25 MG: 25 TABLET ORAL at 22:26

## 2018-06-09 RX ADMIN — CALCIUM CITRATE 200 MG (950 MG) TABLET 950 MG: at 13:20

## 2018-06-09 RX ADMIN — INSULIN LISPRO 2 UNITS: 100 INJECTION, SOLUTION INTRAVENOUS; SUBCUTANEOUS at 21:36

## 2018-06-09 RX ADMIN — MOMETASONE FUROATE AND FORMOTEROL FUMARATE DIHYDRATE 2 PUFF: 200; 5 AEROSOL RESPIRATORY (INHALATION) at 21:34

## 2018-06-09 RX ADMIN — MEROPENEM 1 G: 1 INJECTION, POWDER, FOR SOLUTION INTRAVENOUS at 05:33

## 2018-06-09 RX ADMIN — RASAGILINE MESYLATE 1 MG: 1 TABLET ORAL at 10:04

## 2018-06-09 RX ADMIN — ALPRAZOLAM 0.25 MG: 0.25 TABLET ORAL at 05:38

## 2018-06-09 RX ADMIN — MEROPENEM 1 G: 1 INJECTION, POWDER, FOR SOLUTION INTRAVENOUS at 13:20

## 2018-06-09 RX ADMIN — HYDROCODONE BITARTRATE AND ACETAMINOPHEN 1 TABLET: 5; 325 TABLET ORAL at 09:39

## 2018-06-09 RX ADMIN — ATORVASTATIN CALCIUM 20 MG: 20 TABLET, FILM COATED ORAL at 09:40

## 2018-06-09 RX ADMIN — AMIODARONE HYDROCHLORIDE 200 MG: 200 TABLET ORAL at 13:20

## 2018-06-09 RX ADMIN — METOPROLOL TARTRATE 25 MG: 25 TABLET ORAL at 09:40

## 2018-06-09 RX ADMIN — ROPINIROLE HYDROCHLORIDE 2 MG: 1 TABLET, FILM COATED ORAL at 21:33

## 2018-06-09 ASSESSMENT — PAIN SCALES - GENERAL
PAINLEVEL_OUTOF10: 5
PAINLEVEL_OUTOF10: 5
PAINLEVEL_OUTOF10: 10

## 2018-06-09 ASSESSMENT — PAIN DESCRIPTION - PAIN TYPE: TYPE: CHRONIC PAIN

## 2018-06-09 ASSESSMENT — ENCOUNTER SYMPTOMS
DOUBLE VISION: 0
NAUSEA: 0
SHORTNESS OF BREATH: 1
VOMITING: 0
ABDOMINAL PAIN: 0
WHEEZING: 0
SORE THROAT: 0
CONSTIPATION: 0
DIARRHEA: 0
BLURRED VISION: 0

## 2018-06-09 ASSESSMENT — PAIN DESCRIPTION - LOCATION: LOCATION: GENERALIZED

## 2018-06-09 ASSESSMENT — PAIN DESCRIPTION - FREQUENCY: FREQUENCY: INTERMITTENT

## 2018-06-10 LAB
ABSOLUTE BANDS #: 0.4 K/UL (ref 0–1)
ABSOLUTE EOS #: 0.13 K/UL (ref 0–0.4)
ABSOLUTE IMMATURE GRANULOCYTE: ABNORMAL K/UL (ref 0–0.3)
ABSOLUTE LYMPH #: 1.85 K/UL (ref 1–4.8)
ABSOLUTE MONO #: 0.53 K/UL (ref 0.1–1.3)
ANION GAP SERPL CALCULATED.3IONS-SCNC: 9 MMOL/L (ref 9–17)
BANDS: 3 % (ref 0–10)
BASOPHILS # BLD: 0 % (ref 0–2)
BASOPHILS ABSOLUTE: 0 K/UL (ref 0–0.2)
BUN BLDV-MCNC: 27 MG/DL (ref 8–23)
BUN/CREAT BLD: ABNORMAL (ref 9–20)
CALCIUM SERPL-MCNC: 7.6 MG/DL (ref 8.6–10.4)
CHLORIDE BLD-SCNC: 97 MMOL/L (ref 98–107)
CO2: 37 MMOL/L (ref 20–31)
CREAT SERPL-MCNC: 1.28 MG/DL (ref 0.5–0.9)
DIFFERENTIAL TYPE: ABNORMAL
EOSINOPHILS RELATIVE PERCENT: 1 % (ref 0–4)
GFR AFRICAN AMERICAN: 50 ML/MIN
GFR NON-AFRICAN AMERICAN: 41 ML/MIN
GFR SERPL CREATININE-BSD FRML MDRD: ABNORMAL ML/MIN/{1.73_M2}
GFR SERPL CREATININE-BSD FRML MDRD: ABNORMAL ML/MIN/{1.73_M2}
GLUCOSE BLD-MCNC: 121 MG/DL (ref 65–105)
GLUCOSE BLD-MCNC: 145 MG/DL (ref 65–105)
GLUCOSE BLD-MCNC: 168 MG/DL (ref 70–99)
GLUCOSE BLD-MCNC: 214 MG/DL (ref 65–105)
HCT VFR BLD CALC: 24.4 % (ref 36–46)
HEMOGLOBIN: 7.8 G/DL (ref 12–16)
IMMATURE GRANULOCYTES: ABNORMAL %
LYMPHOCYTES # BLD: 14 % (ref 24–44)
MCH RBC QN AUTO: 31.4 PG (ref 26–34)
MCHC RBC AUTO-ENTMCNC: 32.1 G/DL (ref 31–37)
MCV RBC AUTO: 97.8 FL (ref 80–100)
METAMYELOCYTES ABSOLUTE COUNT: 0.13 K/UL
METAMYELOCYTES: 1 %
MONOCYTES # BLD: 4 % (ref 1–7)
MORPHOLOGY: ABNORMAL
NRBC AUTOMATED: ABNORMAL PER 100 WBC
PDW BLD-RTO: 17.3 % (ref 11.5–14.9)
PLATELET # BLD: 138 K/UL (ref 150–450)
PLATELET ESTIMATE: ABNORMAL
PMV BLD AUTO: 8.3 FL (ref 6–12)
POTASSIUM SERPL-SCNC: 3.8 MMOL/L (ref 3.7–5.3)
RBC # BLD: 2.5 M/UL (ref 4–5.2)
RBC # BLD: ABNORMAL 10*6/UL
SEG NEUTROPHILS: 77 % (ref 36–66)
SEGMENTED NEUTROPHILS ABSOLUTE COUNT: 10.16 K/UL (ref 1.3–9.1)
SODIUM BLD-SCNC: 143 MMOL/L (ref 135–144)
WBC # BLD: 13.2 K/UL (ref 3.5–11)
WBC # BLD: ABNORMAL 10*3/UL

## 2018-06-10 PROCEDURE — 6370000000 HC RX 637 (ALT 250 FOR IP): Performed by: INTERNAL MEDICINE

## 2018-06-10 PROCEDURE — 6360000002 HC RX W HCPCS: Performed by: EMERGENCY MEDICINE

## 2018-06-10 PROCEDURE — 94760 N-INVAS EAR/PLS OXIMETRY 1: CPT

## 2018-06-10 PROCEDURE — 6370000000 HC RX 637 (ALT 250 FOR IP): Performed by: STUDENT IN AN ORGANIZED HEALTH CARE EDUCATION/TRAINING PROGRAM

## 2018-06-10 PROCEDURE — 94640 AIRWAY INHALATION TREATMENT: CPT

## 2018-06-10 PROCEDURE — 94664 DEMO&/EVAL PT USE INHALER: CPT

## 2018-06-10 PROCEDURE — 2700000000 HC OXYGEN THERAPY PER DAY

## 2018-06-10 PROCEDURE — 82947 ASSAY GLUCOSE BLOOD QUANT: CPT

## 2018-06-10 PROCEDURE — 2580000003 HC RX 258: Performed by: EMERGENCY MEDICINE

## 2018-06-10 PROCEDURE — 97116 GAIT TRAINING THERAPY: CPT

## 2018-06-10 PROCEDURE — 94761 N-INVAS EAR/PLS OXIMETRY MLT: CPT

## 2018-06-10 PROCEDURE — 6360000002 HC RX W HCPCS: Performed by: STUDENT IN AN ORGANIZED HEALTH CARE EDUCATION/TRAINING PROGRAM

## 2018-06-10 PROCEDURE — 2060000000 HC ICU INTERMEDIATE R&B

## 2018-06-10 PROCEDURE — 80048 BASIC METABOLIC PNL TOTAL CA: CPT

## 2018-06-10 PROCEDURE — 99233 SBSQ HOSP IP/OBS HIGH 50: CPT | Performed by: INTERNAL MEDICINE

## 2018-06-10 PROCEDURE — 36415 COLL VENOUS BLD VENIPUNCTURE: CPT

## 2018-06-10 PROCEDURE — 2580000003 HC RX 258: Performed by: INTERNAL MEDICINE

## 2018-06-10 PROCEDURE — 85025 COMPLETE CBC W/AUTO DIFF WBC: CPT

## 2018-06-10 RX ORDER — ALBUTEROL SULFATE 2.5 MG/3ML
2.5 SOLUTION RESPIRATORY (INHALATION) 4 TIMES DAILY
Status: DISCONTINUED | OUTPATIENT
Start: 2018-06-10 | End: 2018-06-12

## 2018-06-10 RX ADMIN — MEROPENEM 1 G: 1 INJECTION, POWDER, FOR SOLUTION INTRAVENOUS at 12:53

## 2018-06-10 RX ADMIN — MICONAZOLE NITRATE: 20.6 POWDER TOPICAL at 21:37

## 2018-06-10 RX ADMIN — INSULIN LISPRO 6 UNITS: 100 INJECTION, SOLUTION INTRAVENOUS; SUBCUTANEOUS at 21:32

## 2018-06-10 RX ADMIN — INSULIN LISPRO 3 UNITS: 100 INJECTION, SOLUTION INTRAVENOUS; SUBCUTANEOUS at 07:55

## 2018-06-10 RX ADMIN — INSULIN LISPRO 6 UNITS: 100 INJECTION, SOLUTION INTRAVENOUS; SUBCUTANEOUS at 11:52

## 2018-06-10 RX ADMIN — AMIODARONE HYDROCHLORIDE 200 MG: 200 TABLET ORAL at 07:54

## 2018-06-10 RX ADMIN — ALBUTEROL SULFATE 2.5 MG: 2.5 SOLUTION RESPIRATORY (INHALATION) at 15:29

## 2018-06-10 RX ADMIN — METOPROLOL TARTRATE 25 MG: 25 TABLET ORAL at 07:54

## 2018-06-10 RX ADMIN — CALCIUM CITRATE 200 MG (950 MG) TABLET 950 MG: at 14:47

## 2018-06-10 RX ADMIN — ROPINIROLE HYDROCHLORIDE 2 MG: 1 TABLET, FILM COATED ORAL at 21:35

## 2018-06-10 RX ADMIN — MEROPENEM 1 G: 1 INJECTION, POWDER, FOR SOLUTION INTRAVENOUS at 21:35

## 2018-06-10 RX ADMIN — CARBIDOPA AND LEVODOPA 1 TABLET: 25; 100 TABLET, EXTENDED RELEASE ORAL at 07:55

## 2018-06-10 RX ADMIN — ALBUTEROL SULFATE 2.5 MG: 2.5 SOLUTION RESPIRATORY (INHALATION) at 20:00

## 2018-06-10 RX ADMIN — ROPINIROLE HYDROCHLORIDE 2 MG: 1 TABLET, FILM COATED ORAL at 07:54

## 2018-06-10 RX ADMIN — ALPRAZOLAM 0.25 MG: 0.25 TABLET ORAL at 16:50

## 2018-06-10 RX ADMIN — ALBUTEROL SULFATE 2.5 MG: 2.5 SOLUTION RESPIRATORY (INHALATION) at 06:44

## 2018-06-10 RX ADMIN — MICONAZOLE NITRATE: 20.6 POWDER TOPICAL at 07:58

## 2018-06-10 RX ADMIN — CARBIDOPA AND LEVODOPA 1 TABLET: 25; 100 TABLET, EXTENDED RELEASE ORAL at 12:56

## 2018-06-10 RX ADMIN — METOPROLOL TARTRATE 25 MG: 25 TABLET ORAL at 21:38

## 2018-06-10 RX ADMIN — ALPRAZOLAM 0.25 MG: 0.25 TABLET ORAL at 08:04

## 2018-06-10 RX ADMIN — ALBUTEROL SULFATE 2.5 MG: 2.5 SOLUTION RESPIRATORY (INHALATION) at 11:29

## 2018-06-10 RX ADMIN — CARBIDOPA AND LEVODOPA 1 TABLET: 25; 100 TABLET, EXTENDED RELEASE ORAL at 22:55

## 2018-06-10 RX ADMIN — CALCIUM CITRATE 200 MG (950 MG) TABLET 950 MG: at 22:55

## 2018-06-10 RX ADMIN — ROPINIROLE HYDROCHLORIDE 2 MG: 1 TABLET, FILM COATED ORAL at 14:48

## 2018-06-10 RX ADMIN — Medication 10 ML: at 21:34

## 2018-06-10 RX ADMIN — CALCIUM CITRATE 200 MG (950 MG) TABLET 950 MG: at 07:55

## 2018-06-10 RX ADMIN — HYDROCODONE BITARTRATE AND ACETAMINOPHEN 1 TABLET: 5; 325 TABLET ORAL at 04:03

## 2018-06-10 RX ADMIN — RASAGILINE MESYLATE 1 MG: 1 TABLET ORAL at 07:55

## 2018-06-10 RX ADMIN — ALBUTEROL SULFATE 2.5 MG: 2.5 SOLUTION RESPIRATORY (INHALATION) at 03:01

## 2018-06-10 RX ADMIN — HYDROCODONE BITARTRATE AND ACETAMINOPHEN 1 TABLET: 5; 325 TABLET ORAL at 18:27

## 2018-06-10 RX ADMIN — ATORVASTATIN CALCIUM 20 MG: 20 TABLET, FILM COATED ORAL at 07:54

## 2018-06-10 RX ADMIN — ASPIRIN 81 MG: 81 TABLET, COATED ORAL at 07:54

## 2018-06-10 RX ADMIN — PANTOPRAZOLE SODIUM 40 MG: 40 TABLET, DELAYED RELEASE ORAL at 06:10

## 2018-06-10 RX ADMIN — ALPRAZOLAM 0.25 MG: 0.25 TABLET ORAL at 22:55

## 2018-06-10 RX ADMIN — MOMETASONE FUROATE AND FORMOTEROL FUMARATE DIHYDRATE 2 PUFF: 200; 5 AEROSOL RESPIRATORY (INHALATION) at 21:34

## 2018-06-10 RX ADMIN — Medication 10 ML: at 07:56

## 2018-06-10 RX ADMIN — FUROSEMIDE 20 MG: 20 TABLET ORAL at 07:54

## 2018-06-10 RX ADMIN — MEROPENEM 1 G: 1 INJECTION, POWDER, FOR SOLUTION INTRAVENOUS at 04:04

## 2018-06-10 RX ADMIN — PANTOPRAZOLE SODIUM 40 MG: 40 TABLET, DELAYED RELEASE ORAL at 18:21

## 2018-06-10 ASSESSMENT — ENCOUNTER SYMPTOMS
CONSTIPATION: 0
VOMITING: 0
SORE THROAT: 0
NAUSEA: 0
DIARRHEA: 0
DOUBLE VISION: 0
WHEEZING: 0
BLURRED VISION: 0
SHORTNESS OF BREATH: 0
ABDOMINAL PAIN: 0

## 2018-06-10 ASSESSMENT — PAIN DESCRIPTION - DESCRIPTORS: DESCRIPTORS: ACHING;DISCOMFORT

## 2018-06-10 ASSESSMENT — PAIN SCALES - GENERAL
PAINLEVEL_OUTOF10: 10
PAINLEVEL_OUTOF10: 5
PAINLEVEL_OUTOF10: 0
PAINLEVEL_OUTOF10: 10

## 2018-06-10 ASSESSMENT — PAIN DESCRIPTION - PAIN TYPE: TYPE: CHRONIC PAIN

## 2018-06-10 ASSESSMENT — PAIN DESCRIPTION - LOCATION: LOCATION: GENERALIZED

## 2018-06-11 LAB
ABSOLUTE BANDS #: 0.36 K/UL (ref 0–1)
ABSOLUTE EOS #: 0 K/UL (ref 0–0.4)
ABSOLUTE IMMATURE GRANULOCYTE: ABNORMAL K/UL (ref 0–0.3)
ABSOLUTE LYMPH #: 0.55 K/UL (ref 1–4.8)
ABSOLUTE MONO #: 0.73 K/UL (ref 0.1–1.3)
ANION GAP SERPL CALCULATED.3IONS-SCNC: 7 MMOL/L (ref 9–17)
BANDS: 4 % (ref 0–10)
BASOPHILS # BLD: 0 % (ref 0–2)
BASOPHILS ABSOLUTE: 0 K/UL (ref 0–0.2)
BUN BLDV-MCNC: 27 MG/DL (ref 8–23)
BUN/CREAT BLD: ABNORMAL (ref 9–20)
CALCIUM SERPL-MCNC: 7.5 MG/DL (ref 8.6–10.4)
CHLORIDE BLD-SCNC: 98 MMOL/L (ref 98–107)
CO2: 39 MMOL/L (ref 20–31)
CREAT SERPL-MCNC: 1.33 MG/DL (ref 0.5–0.9)
DIFFERENTIAL TYPE: ABNORMAL
EOSINOPHILS RELATIVE PERCENT: 0 % (ref 0–4)
GFR AFRICAN AMERICAN: 48 ML/MIN
GFR NON-AFRICAN AMERICAN: 39 ML/MIN
GFR SERPL CREATININE-BSD FRML MDRD: ABNORMAL ML/MIN/{1.73_M2}
GFR SERPL CREATININE-BSD FRML MDRD: ABNORMAL ML/MIN/{1.73_M2}
GLUCOSE BLD-MCNC: 147 MG/DL (ref 65–105)
GLUCOSE BLD-MCNC: 174 MG/DL (ref 65–105)
GLUCOSE BLD-MCNC: 181 MG/DL (ref 65–105)
GLUCOSE BLD-MCNC: 217 MG/DL (ref 70–99)
GLUCOSE BLD-MCNC: 270 MG/DL (ref 65–105)
GLUCOSE BLD-MCNC: 307 MG/DL (ref 65–105)
HCT VFR BLD CALC: 23.8 % (ref 36–46)
HEMOGLOBIN: 7.7 G/DL (ref 12–16)
IMMATURE GRANULOCYTES: ABNORMAL %
LYMPHOCYTES # BLD: 6 % (ref 24–44)
MCH RBC QN AUTO: 31.9 PG (ref 26–34)
MCHC RBC AUTO-ENTMCNC: 32.1 G/DL (ref 31–37)
MCV RBC AUTO: 99.3 FL (ref 80–100)
MONOCYTES # BLD: 8 % (ref 1–7)
MORPHOLOGY: ABNORMAL
NRBC AUTOMATED: ABNORMAL PER 100 WBC
NUCLEATED RED BLOOD CELLS: 1 PER 100 WBC
PDW BLD-RTO: 17.8 % (ref 11.5–14.9)
PLATELET # BLD: 126 K/UL (ref 150–450)
PLATELET ESTIMATE: ABNORMAL
PMV BLD AUTO: 7.9 FL (ref 6–12)
POTASSIUM SERPL-SCNC: 4.2 MMOL/L (ref 3.7–5.3)
RBC # BLD: 2.4 M/UL (ref 4–5.2)
RBC # BLD: ABNORMAL 10*6/UL
SEG NEUTROPHILS: 82 % (ref 36–66)
SEGMENTED NEUTROPHILS ABSOLUTE COUNT: 7.47 K/UL (ref 1.3–9.1)
SODIUM BLD-SCNC: 144 MMOL/L (ref 135–144)
WBC # BLD: 9.1 K/UL (ref 3.5–11)
WBC # BLD: ABNORMAL 10*3/UL

## 2018-06-11 PROCEDURE — 97110 THERAPEUTIC EXERCISES: CPT

## 2018-06-11 PROCEDURE — 6360000002 HC RX W HCPCS: Performed by: STUDENT IN AN ORGANIZED HEALTH CARE EDUCATION/TRAINING PROGRAM

## 2018-06-11 PROCEDURE — 6370000000 HC RX 637 (ALT 250 FOR IP): Performed by: INTERNAL MEDICINE

## 2018-06-11 PROCEDURE — 80048 BASIC METABOLIC PNL TOTAL CA: CPT

## 2018-06-11 PROCEDURE — 36415 COLL VENOUS BLD VENIPUNCTURE: CPT

## 2018-06-11 PROCEDURE — 2580000003 HC RX 258: Performed by: INTERNAL MEDICINE

## 2018-06-11 PROCEDURE — 97535 SELF CARE MNGMENT TRAINING: CPT

## 2018-06-11 PROCEDURE — 2060000000 HC ICU INTERMEDIATE R&B

## 2018-06-11 PROCEDURE — 97116 GAIT TRAINING THERAPY: CPT

## 2018-06-11 PROCEDURE — 94640 AIRWAY INHALATION TREATMENT: CPT

## 2018-06-11 PROCEDURE — 85025 COMPLETE CBC W/AUTO DIFF WBC: CPT

## 2018-06-11 PROCEDURE — 6360000002 HC RX W HCPCS: Performed by: EMERGENCY MEDICINE

## 2018-06-11 PROCEDURE — 82947 ASSAY GLUCOSE BLOOD QUANT: CPT

## 2018-06-11 PROCEDURE — 99232 SBSQ HOSP IP/OBS MODERATE 35: CPT | Performed by: INTERNAL MEDICINE

## 2018-06-11 PROCEDURE — 94761 N-INVAS EAR/PLS OXIMETRY MLT: CPT

## 2018-06-11 PROCEDURE — 2700000000 HC OXYGEN THERAPY PER DAY

## 2018-06-11 PROCEDURE — 2580000003 HC RX 258: Performed by: EMERGENCY MEDICINE

## 2018-06-11 PROCEDURE — 6370000000 HC RX 637 (ALT 250 FOR IP): Performed by: STUDENT IN AN ORGANIZED HEALTH CARE EDUCATION/TRAINING PROGRAM

## 2018-06-11 RX ADMIN — MEROPENEM 1 G: 1 INJECTION, POWDER, FOR SOLUTION INTRAVENOUS at 03:24

## 2018-06-11 RX ADMIN — INSULIN LISPRO 3 UNITS: 100 INJECTION, SOLUTION INTRAVENOUS; SUBCUTANEOUS at 08:20

## 2018-06-11 RX ADMIN — MEROPENEM 1 G: 1 INJECTION, POWDER, FOR SOLUTION INTRAVENOUS at 12:54

## 2018-06-11 RX ADMIN — Medication 10 ML: at 20:30

## 2018-06-11 RX ADMIN — CARBIDOPA AND LEVODOPA 1 TABLET: 25; 100 TABLET, EXTENDED RELEASE ORAL at 09:31

## 2018-06-11 RX ADMIN — ALBUTEROL SULFATE 2.5 MG: 2.5 SOLUTION RESPIRATORY (INHALATION) at 15:51

## 2018-06-11 RX ADMIN — Medication 10 ML: at 09:33

## 2018-06-11 RX ADMIN — MOMETASONE FUROATE AND FORMOTEROL FUMARATE DIHYDRATE 2 PUFF: 200; 5 AEROSOL RESPIRATORY (INHALATION) at 09:30

## 2018-06-11 RX ADMIN — CARBIDOPA AND LEVODOPA 1 TABLET: 25; 100 TABLET, EXTENDED RELEASE ORAL at 17:36

## 2018-06-11 RX ADMIN — CARBIDOPA AND LEVODOPA 1 TABLET: 25; 100 TABLET, EXTENDED RELEASE ORAL at 20:29

## 2018-06-11 RX ADMIN — ALBUTEROL SULFATE 2.5 MG: 2.5 SOLUTION RESPIRATORY (INHALATION) at 11:43

## 2018-06-11 RX ADMIN — ALBUTEROL SULFATE 2.5 MG: 2.5 SOLUTION RESPIRATORY (INHALATION) at 20:52

## 2018-06-11 RX ADMIN — PANTOPRAZOLE SODIUM 40 MG: 40 TABLET, DELAYED RELEASE ORAL at 05:26

## 2018-06-11 RX ADMIN — METOPROLOL TARTRATE 25 MG: 25 TABLET ORAL at 20:29

## 2018-06-11 RX ADMIN — RASAGILINE MESYLATE 1 MG: 1 TABLET ORAL at 09:31

## 2018-06-11 RX ADMIN — FUROSEMIDE 20 MG: 20 TABLET ORAL at 09:31

## 2018-06-11 RX ADMIN — ASPIRIN 81 MG: 81 TABLET, COATED ORAL at 09:31

## 2018-06-11 RX ADMIN — ALBUTEROL SULFATE 2.5 MG: 2.5 SOLUTION RESPIRATORY (INHALATION) at 07:06

## 2018-06-11 RX ADMIN — INSULIN LISPRO 2 UNITS: 100 INJECTION, SOLUTION INTRAVENOUS; SUBCUTANEOUS at 20:35

## 2018-06-11 RX ADMIN — MOMETASONE FUROATE AND FORMOTEROL FUMARATE DIHYDRATE 2 PUFF: 200; 5 AEROSOL RESPIRATORY (INHALATION) at 20:29

## 2018-06-11 RX ADMIN — METOPROLOL TARTRATE 25 MG: 25 TABLET ORAL at 09:31

## 2018-06-11 RX ADMIN — ROPINIROLE HYDROCHLORIDE 2 MG: 1 TABLET, FILM COATED ORAL at 14:07

## 2018-06-11 RX ADMIN — ATORVASTATIN CALCIUM 20 MG: 20 TABLET, FILM COATED ORAL at 09:31

## 2018-06-11 RX ADMIN — PANTOPRAZOLE SODIUM 40 MG: 40 TABLET, DELAYED RELEASE ORAL at 17:35

## 2018-06-11 RX ADMIN — CALCIUM CITRATE 200 MG (950 MG) TABLET 950 MG: at 20:29

## 2018-06-11 RX ADMIN — ROPINIROLE HYDROCHLORIDE 2 MG: 1 TABLET, FILM COATED ORAL at 20:29

## 2018-06-11 RX ADMIN — INSULIN LISPRO 3 UNITS: 100 INJECTION, SOLUTION INTRAVENOUS; SUBCUTANEOUS at 17:36

## 2018-06-11 RX ADMIN — ROPINIROLE HYDROCHLORIDE 2 MG: 1 TABLET, FILM COATED ORAL at 09:31

## 2018-06-11 RX ADMIN — MICONAZOLE NITRATE: 20.6 POWDER TOPICAL at 09:31

## 2018-06-11 RX ADMIN — CARBIDOPA AND LEVODOPA 1 TABLET: 25; 100 TABLET, EXTENDED RELEASE ORAL at 14:08

## 2018-06-11 RX ADMIN — ALPRAZOLAM 0.25 MG: 0.25 TABLET ORAL at 05:26

## 2018-06-11 RX ADMIN — AMIODARONE HYDROCHLORIDE 200 MG: 200 TABLET ORAL at 09:31

## 2018-06-11 RX ADMIN — HYDROCODONE BITARTRATE AND ACETAMINOPHEN 1 TABLET: 5; 325 TABLET ORAL at 20:29

## 2018-06-11 RX ADMIN — HYDROCODONE BITARTRATE AND ACETAMINOPHEN 1 TABLET: 5; 325 TABLET ORAL at 14:13

## 2018-06-11 RX ADMIN — MEROPENEM 1 G: 1 INJECTION, POWDER, FOR SOLUTION INTRAVENOUS at 20:29

## 2018-06-11 RX ADMIN — MICONAZOLE NITRATE: 20.6 POWDER TOPICAL at 20:30

## 2018-06-11 RX ADMIN — CALCIUM CITRATE 200 MG (950 MG) TABLET 950 MG: at 14:07

## 2018-06-11 RX ADMIN — INSULIN LISPRO 9 UNITS: 100 INJECTION, SOLUTION INTRAVENOUS; SUBCUTANEOUS at 12:52

## 2018-06-11 RX ADMIN — CALCIUM CITRATE 200 MG (950 MG) TABLET 950 MG: at 09:32

## 2018-06-11 ASSESSMENT — PAIN SCALES - GENERAL
PAINLEVEL_OUTOF10: 6
PAINLEVEL_OUTOF10: 10
PAINLEVEL_OUTOF10: 3
PAINLEVEL_OUTOF10: 4
PAINLEVEL_OUTOF10: 0

## 2018-06-11 ASSESSMENT — PAIN DESCRIPTION - PAIN TYPE: TYPE: CHRONIC PAIN

## 2018-06-11 ASSESSMENT — PAIN DESCRIPTION - LOCATION: LOCATION: GENERALIZED

## 2018-06-12 VITALS
SYSTOLIC BLOOD PRESSURE: 137 MMHG | HEART RATE: 69 BPM | TEMPERATURE: 97.9 F | OXYGEN SATURATION: 97 % | BODY MASS INDEX: 36.84 KG/M2 | DIASTOLIC BLOOD PRESSURE: 50 MMHG | WEIGHT: 195.11 LBS | RESPIRATION RATE: 19 BRPM | HEIGHT: 61 IN

## 2018-06-12 LAB
ABSOLUTE BANDS #: 0.19 K/UL (ref 0–1)
ABSOLUTE EOS #: 0.09 K/UL (ref 0–0.4)
ABSOLUTE IMMATURE GRANULOCYTE: ABNORMAL K/UL (ref 0–0.3)
ABSOLUTE LYMPH #: 0.94 K/UL (ref 1–4.8)
ABSOLUTE MONO #: 0.85 K/UL (ref 0.1–1.3)
ANION GAP SERPL CALCULATED.3IONS-SCNC: 7 MMOL/L (ref 9–17)
BANDS: 2 % (ref 0–10)
BASOPHILS # BLD: 0 % (ref 0–2)
BASOPHILS ABSOLUTE: 0 K/UL (ref 0–0.2)
BUN BLDV-MCNC: 23 MG/DL (ref 8–23)
BUN/CREAT BLD: ABNORMAL (ref 9–20)
CALCIUM SERPL-MCNC: 7.7 MG/DL (ref 8.6–10.4)
CHLORIDE BLD-SCNC: 98 MMOL/L (ref 98–107)
CO2: 39 MMOL/L (ref 20–31)
CREAT SERPL-MCNC: 1.33 MG/DL (ref 0.5–0.9)
DIFFERENTIAL TYPE: ABNORMAL
EOSINOPHILS RELATIVE PERCENT: 1 % (ref 0–4)
GFR AFRICAN AMERICAN: 48 ML/MIN
GFR NON-AFRICAN AMERICAN: 39 ML/MIN
GFR SERPL CREATININE-BSD FRML MDRD: ABNORMAL ML/MIN/{1.73_M2}
GFR SERPL CREATININE-BSD FRML MDRD: ABNORMAL ML/MIN/{1.73_M2}
GLUCOSE BLD-MCNC: 165 MG/DL (ref 65–105)
GLUCOSE BLD-MCNC: 168 MG/DL (ref 65–105)
GLUCOSE BLD-MCNC: 185 MG/DL (ref 70–99)
HCT VFR BLD CALC: 24.1 % (ref 36–46)
HEMOGLOBIN: 7.9 G/DL (ref 12–16)
IMMATURE GRANULOCYTES: ABNORMAL %
LYMPHOCYTES # BLD: 10 % (ref 24–44)
MCH RBC QN AUTO: 33.4 PG (ref 26–34)
MCHC RBC AUTO-ENTMCNC: 32.6 G/DL (ref 31–37)
MCV RBC AUTO: 102.4 FL (ref 80–100)
MONOCYTES # BLD: 9 % (ref 1–7)
MORPHOLOGY: ABNORMAL
NRBC AUTOMATED: ABNORMAL PER 100 WBC
PDW BLD-RTO: 17.3 % (ref 11.5–14.9)
PLATELET # BLD: 118 K/UL (ref 150–450)
PLATELET ESTIMATE: ABNORMAL
PMV BLD AUTO: 8.5 FL (ref 6–12)
POTASSIUM SERPL-SCNC: 4.3 MMOL/L (ref 3.7–5.3)
RBC # BLD: 2.35 M/UL (ref 4–5.2)
RBC # BLD: ABNORMAL 10*6/UL
SEG NEUTROPHILS: 78 % (ref 36–66)
SEGMENTED NEUTROPHILS ABSOLUTE COUNT: 7.33 K/UL (ref 1.3–9.1)
SODIUM BLD-SCNC: 144 MMOL/L (ref 135–144)
WBC # BLD: 9.4 K/UL (ref 3.5–11)
WBC # BLD: ABNORMAL 10*3/UL

## 2018-06-12 PROCEDURE — 97535 SELF CARE MNGMENT TRAINING: CPT

## 2018-06-12 PROCEDURE — 82947 ASSAY GLUCOSE BLOOD QUANT: CPT

## 2018-06-12 PROCEDURE — 94761 N-INVAS EAR/PLS OXIMETRY MLT: CPT

## 2018-06-12 PROCEDURE — 6370000000 HC RX 637 (ALT 250 FOR IP): Performed by: INTERNAL MEDICINE

## 2018-06-12 PROCEDURE — 2700000000 HC OXYGEN THERAPY PER DAY

## 2018-06-12 PROCEDURE — 99239 HOSP IP/OBS DSCHRG MGMT >30: CPT | Performed by: INTERNAL MEDICINE

## 2018-06-12 PROCEDURE — 36415 COLL VENOUS BLD VENIPUNCTURE: CPT

## 2018-06-12 PROCEDURE — 97110 THERAPEUTIC EXERCISES: CPT

## 2018-06-12 PROCEDURE — 94640 AIRWAY INHALATION TREATMENT: CPT

## 2018-06-12 PROCEDURE — 2580000003 HC RX 258: Performed by: EMERGENCY MEDICINE

## 2018-06-12 PROCEDURE — 6360000002 HC RX W HCPCS: Performed by: EMERGENCY MEDICINE

## 2018-06-12 PROCEDURE — 6370000000 HC RX 637 (ALT 250 FOR IP): Performed by: STUDENT IN AN ORGANIZED HEALTH CARE EDUCATION/TRAINING PROGRAM

## 2018-06-12 PROCEDURE — 97116 GAIT TRAINING THERAPY: CPT

## 2018-06-12 PROCEDURE — 85025 COMPLETE CBC W/AUTO DIFF WBC: CPT

## 2018-06-12 PROCEDURE — 6360000002 HC RX W HCPCS: Performed by: STUDENT IN AN ORGANIZED HEALTH CARE EDUCATION/TRAINING PROGRAM

## 2018-06-12 PROCEDURE — 80048 BASIC METABOLIC PNL TOTAL CA: CPT

## 2018-06-12 PROCEDURE — 2580000003 HC RX 258: Performed by: INTERNAL MEDICINE

## 2018-06-12 RX ORDER — ALPRAZOLAM 0.25 MG/1
0.25 TABLET ORAL 3 TIMES DAILY PRN
Qty: 10 TABLET | Refills: 0 | Status: SHIPPED | OUTPATIENT
Start: 2018-06-12 | End: 2018-06-17

## 2018-06-12 RX ORDER — HYDROCODONE BITARTRATE AND ACETAMINOPHEN 5; 325 MG/1; MG/1
1 TABLET ORAL EVERY 8 HOURS PRN
Qty: 6 TABLET | Refills: 0 | Status: SHIPPED | OUTPATIENT
Start: 2018-06-12 | End: 2018-06-12

## 2018-06-12 RX ORDER — ALBUTEROL SULFATE 2.5 MG/3ML
2.5 SOLUTION RESPIRATORY (INHALATION) 3 TIMES DAILY
Status: DISCONTINUED | OUTPATIENT
Start: 2018-06-12 | End: 2018-06-12 | Stop reason: HOSPADM

## 2018-06-12 RX ORDER — HYDROCODONE BITARTRATE AND ACETAMINOPHEN 5; 325 MG/1; MG/1
1 TABLET ORAL EVERY 8 HOURS PRN
Qty: 6 TABLET | Refills: 0 | Status: SHIPPED | OUTPATIENT
Start: 2018-06-12 | End: 2018-06-15

## 2018-06-12 RX ORDER — ALPRAZOLAM 0.25 MG/1
0.25 TABLET ORAL 3 TIMES DAILY PRN
Qty: 10 TABLET | Refills: 0 | Status: SHIPPED | OUTPATIENT
Start: 2018-06-12 | End: 2018-07-06 | Stop reason: SDUPTHER

## 2018-06-12 RX ADMIN — INSULIN LISPRO 3 UNITS: 100 INJECTION, SOLUTION INTRAVENOUS; SUBCUTANEOUS at 12:01

## 2018-06-12 RX ADMIN — HYDROCODONE BITARTRATE AND ACETAMINOPHEN 1 TABLET: 5; 325 TABLET ORAL at 05:13

## 2018-06-12 RX ADMIN — CALCIUM CITRATE 200 MG (950 MG) TABLET 950 MG: at 08:44

## 2018-06-12 RX ADMIN — RASAGILINE MESYLATE 1 MG: 1 TABLET ORAL at 08:44

## 2018-06-12 RX ADMIN — METOPROLOL TARTRATE 25 MG: 25 TABLET ORAL at 08:44

## 2018-06-12 RX ADMIN — PANTOPRAZOLE SODIUM 40 MG: 40 TABLET, DELAYED RELEASE ORAL at 05:13

## 2018-06-12 RX ADMIN — MEROPENEM 1 G: 1 INJECTION, POWDER, FOR SOLUTION INTRAVENOUS at 03:47

## 2018-06-12 RX ADMIN — FUROSEMIDE 20 MG: 20 TABLET ORAL at 08:44

## 2018-06-12 RX ADMIN — MICONAZOLE NITRATE: 20.6 POWDER TOPICAL at 08:45

## 2018-06-12 RX ADMIN — CARBIDOPA AND LEVODOPA 1 TABLET: 25; 100 TABLET, EXTENDED RELEASE ORAL at 08:44

## 2018-06-12 RX ADMIN — Medication 10 ML: at 08:45

## 2018-06-12 RX ADMIN — ALBUTEROL SULFATE 2.5 MG: 2.5 SOLUTION RESPIRATORY (INHALATION) at 13:31

## 2018-06-12 RX ADMIN — MOMETASONE FUROATE AND FORMOTEROL FUMARATE DIHYDRATE 2 PUFF: 200; 5 AEROSOL RESPIRATORY (INHALATION) at 08:50

## 2018-06-12 RX ADMIN — MEROPENEM 1 G: 1 INJECTION, POWDER, FOR SOLUTION INTRAVENOUS at 12:58

## 2018-06-12 RX ADMIN — ROPINIROLE HYDROCHLORIDE 2 MG: 1 TABLET, FILM COATED ORAL at 08:50

## 2018-06-12 RX ADMIN — AMIODARONE HYDROCHLORIDE 200 MG: 200 TABLET ORAL at 08:44

## 2018-06-12 RX ADMIN — ALBUTEROL SULFATE 2.5 MG: 2.5 SOLUTION RESPIRATORY (INHALATION) at 07:21

## 2018-06-12 RX ADMIN — INSULIN LISPRO 3 UNITS: 100 INJECTION, SOLUTION INTRAVENOUS; SUBCUTANEOUS at 08:54

## 2018-06-12 RX ADMIN — ATORVASTATIN CALCIUM 20 MG: 20 TABLET, FILM COATED ORAL at 08:44

## 2018-06-12 RX ADMIN — ASPIRIN 81 MG: 81 TABLET, COATED ORAL at 08:44

## 2018-06-12 RX ADMIN — CARBIDOPA AND LEVODOPA 1 TABLET: 25; 100 TABLET, EXTENDED RELEASE ORAL at 12:58

## 2018-06-12 ASSESSMENT — PAIN SCALES - GENERAL: PAINLEVEL_OUTOF10: 10

## 2018-06-15 ENCOUNTER — HOSPITAL ENCOUNTER (OUTPATIENT)
Facility: MEDICAL CENTER | Age: 73
Discharge: HOME | End: 2018-06-15
Payer: COMMERCIAL

## 2018-06-15 ENCOUNTER — HOSPITAL ENCOUNTER (OUTPATIENT)
Age: 73
Setting detail: SPECIMEN
Discharge: HOME | End: 2018-06-15
Payer: MEDICARE

## 2018-06-15 LAB
ANION GAP SERPL CALCULATED.3IONS-SCNC: 15 MMOL/L (ref 9–17)
BUN BLDV-MCNC: 30 MG/DL (ref 8–23)
BUN/CREAT BLD: 23 (ref 9–20)
CALCIUM SERPL-MCNC: 7.9 MG/DL (ref 8.6–10.4)
CHLORIDE BLD-SCNC: 96 MMOL/L (ref 98–107)
CO2: 31 MMOL/L (ref 20–31)
CREAT SERPL-MCNC: 1.31 MG/DL (ref 0.5–0.9)
CULTURE: NORMAL
GFR AFRICAN AMERICAN: 49 ML/MIN
GFR NON-AFRICAN AMERICAN: 40 ML/MIN
GFR SERPL CREATININE-BSD FRML MDRD: ABNORMAL ML/MIN/{1.73_M2}
GFR SERPL CREATININE-BSD FRML MDRD: ABNORMAL ML/MIN/{1.73_M2}
GLUCOSE BLD-MCNC: 181 MG/DL (ref 70–99)
HCT VFR BLD CALC: 23 % (ref 36–46)
HEMOGLOBIN: 7.7 G/DL (ref 12–16)
Lab: NORMAL
Lab: NORMAL
MCH RBC QN AUTO: 33.3 PG (ref 26–34)
MCHC RBC AUTO-ENTMCNC: 33.3 G/DL (ref 31–37)
MCV RBC AUTO: 100 FL (ref 80–100)
NRBC AUTOMATED: ABNORMAL PER 100 WBC
PDW BLD-RTO: 18 % (ref 11.5–14.5)
PLATELET # BLD: 132 K/UL (ref 130–400)
PMV BLD AUTO: ABNORMAL FL (ref 6–12)
POTASSIUM SERPL-SCNC: 3.9 MMOL/L (ref 3.7–5.3)
RBC # BLD: 2.3 M/UL (ref 4–5.2)
SODIUM BLD-SCNC: 142 MMOL/L (ref 135–144)
SPECIMEN DESCRIPTION: NORMAL
STATUS: NORMAL
STATUS: NORMAL
WBC # BLD: 9.4 K/UL (ref 3.5–11)

## 2018-06-15 PROCEDURE — 36415 COLL VENOUS BLD VENIPUNCTURE: CPT

## 2018-06-15 PROCEDURE — 85027 COMPLETE CBC AUTOMATED: CPT

## 2018-06-15 PROCEDURE — 80048 BASIC METABOLIC PNL TOTAL CA: CPT

## 2018-06-15 PROCEDURE — P9603 ONE-WAY ALLOW PRORATED MILES: HCPCS

## 2018-06-20 ENCOUNTER — HOSPITAL ENCOUNTER (OUTPATIENT)
Age: 73
Setting detail: SPECIMEN
Discharge: HOME | End: 2018-06-20
Payer: MEDICARE

## 2018-06-20 LAB
ABSOLUTE EOS #: 0.78 K/UL (ref 0–0.44)
ABSOLUTE IMMATURE GRANULOCYTE: 0.17 K/UL (ref 0–0.3)
ABSOLUTE LYMPH #: 1.78 K/UL (ref 1.1–3.7)
ABSOLUTE MONO #: 0.55 K/UL (ref 0.1–1.2)
ANION GAP SERPL CALCULATED.3IONS-SCNC: 18 MMOL/L (ref 9–17)
BASOPHILS # BLD: 0 % (ref 0–2)
BASOPHILS ABSOLUTE: 0.04 K/UL (ref 0–0.2)
BUN BLDV-MCNC: 28 MG/DL (ref 8–23)
BUN/CREAT BLD: ABNORMAL (ref 9–20)
CALCIUM SERPL-MCNC: 8.4 MG/DL (ref 8.6–10.4)
CHLORIDE BLD-SCNC: 92 MMOL/L (ref 98–107)
CO2: 31 MMOL/L (ref 20–31)
CREAT SERPL-MCNC: 1.3 MG/DL (ref 0.5–0.9)
DIFFERENTIAL TYPE: ABNORMAL
EOSINOPHILS RELATIVE PERCENT: 9 % (ref 1–4)
GFR AFRICAN AMERICAN: 49 ML/MIN
GFR NON-AFRICAN AMERICAN: 40 ML/MIN
GFR SERPL CREATININE-BSD FRML MDRD: ABNORMAL ML/MIN/{1.73_M2}
GFR SERPL CREATININE-BSD FRML MDRD: ABNORMAL ML/MIN/{1.73_M2}
GLUCOSE BLD-MCNC: 221 MG/DL (ref 70–99)
HCT VFR BLD CALC: 31.1 % (ref 36.3–47.1)
HEMOGLOBIN: 9.2 G/DL (ref 11.9–15.1)
IMMATURE GRANULOCYTES: 2 %
LYMPHOCYTES # BLD: 20 % (ref 24–43)
MCH RBC QN AUTO: 32.1 PG (ref 25.2–33.5)
MCHC RBC AUTO-ENTMCNC: 29.6 G/DL (ref 28.4–34.8)
MCV RBC AUTO: 108.4 FL (ref 82.6–102.9)
MONOCYTES # BLD: 6 % (ref 3–12)
NRBC AUTOMATED: 0 PER 100 WBC
PDW BLD-RTO: 17.8 % (ref 11.8–14.4)
PLATELET # BLD: 119 K/UL (ref 138–453)
PLATELET ESTIMATE: ABNORMAL
PMV BLD AUTO: 11.3 FL (ref 8.1–13.5)
POTASSIUM SERPL-SCNC: 4.4 MMOL/L (ref 3.7–5.3)
RBC # BLD: 2.87 M/UL (ref 3.95–5.11)
RBC # BLD: ABNORMAL 10*6/UL
SEG NEUTROPHILS: 63 % (ref 36–65)
SEGMENTED NEUTROPHILS ABSOLUTE COUNT: 5.81 K/UL (ref 1.5–8.1)
SODIUM BLD-SCNC: 141 MMOL/L (ref 135–144)
WBC # BLD: 9.1 K/UL (ref 3.5–11.3)
WBC # BLD: ABNORMAL 10*3/UL

## 2018-06-25 ENCOUNTER — OFFICE VISIT (OUTPATIENT)
Dept: FAMILY MEDICINE CLINIC | Age: 73
End: 2018-06-25
Payer: COMMERCIAL

## 2018-06-25 VITALS
BODY MASS INDEX: 35.52 KG/M2 | SYSTOLIC BLOOD PRESSURE: 122 MMHG | OXYGEN SATURATION: 94 % | WEIGHT: 188 LBS | RESPIRATION RATE: 12 BRPM | DIASTOLIC BLOOD PRESSURE: 68 MMHG | HEART RATE: 71 BPM

## 2018-06-25 DIAGNOSIS — I10 ESSENTIAL HYPERTENSION: ICD-10-CM

## 2018-06-25 DIAGNOSIS — D50.0 IRON DEFICIENCY ANEMIA DUE TO CHRONIC BLOOD LOSS: ICD-10-CM

## 2018-06-25 DIAGNOSIS — Z09 HOSPITAL DISCHARGE FOLLOW-UP: Primary | ICD-10-CM

## 2018-06-25 DIAGNOSIS — R60.0 EDEMA OF BOTH LEGS: ICD-10-CM

## 2018-06-25 DIAGNOSIS — G20 PARKINSON'S DISEASE (HCC): ICD-10-CM

## 2018-06-25 DIAGNOSIS — I48.20 CHRONIC ATRIAL FIBRILLATION (HCC): ICD-10-CM

## 2018-06-25 DIAGNOSIS — E11.9 TYPE 2 DIABETES MELLITUS WITHOUT COMPLICATION, WITHOUT LONG-TERM CURRENT USE OF INSULIN (HCC): ICD-10-CM

## 2018-06-25 PROCEDURE — 2022F DILAT RTA XM EVC RTNOPTHY: CPT | Performed by: FAMILY MEDICINE

## 2018-06-25 PROCEDURE — 1123F ACP DISCUSS/DSCN MKR DOCD: CPT | Performed by: FAMILY MEDICINE

## 2018-06-25 PROCEDURE — 1111F DSCHRG MED/CURRENT MED MERGE: CPT | Performed by: FAMILY MEDICINE

## 2018-06-25 PROCEDURE — G8400 PT W/DXA NO RESULTS DOC: HCPCS | Performed by: FAMILY MEDICINE

## 2018-06-25 PROCEDURE — 3017F COLORECTAL CA SCREEN DOC REV: CPT | Performed by: FAMILY MEDICINE

## 2018-06-25 PROCEDURE — G8417 CALC BMI ABV UP PARAM F/U: HCPCS | Performed by: FAMILY MEDICINE

## 2018-06-25 PROCEDURE — 4040F PNEUMOC VAC/ADMIN/RCVD: CPT | Performed by: FAMILY MEDICINE

## 2018-06-25 PROCEDURE — 99215 OFFICE O/P EST HI 40 MIN: CPT | Performed by: FAMILY MEDICINE

## 2018-06-25 PROCEDURE — 1090F PRES/ABSN URINE INCON ASSESS: CPT | Performed by: FAMILY MEDICINE

## 2018-06-25 PROCEDURE — 3046F HEMOGLOBIN A1C LEVEL >9.0%: CPT | Performed by: FAMILY MEDICINE

## 2018-06-25 PROCEDURE — 1036F TOBACCO NON-USER: CPT | Performed by: FAMILY MEDICINE

## 2018-06-25 PROCEDURE — G8598 ASA/ANTIPLAT THER USED: HCPCS | Performed by: FAMILY MEDICINE

## 2018-06-25 PROCEDURE — G8427 DOCREV CUR MEDS BY ELIG CLIN: HCPCS | Performed by: FAMILY MEDICINE

## 2018-06-25 ASSESSMENT — PATIENT HEALTH QUESTIONNAIRE - PHQ9
SUM OF ALL RESPONSES TO PHQ9 QUESTIONS 1 & 2: 0
2. FEELING DOWN, DEPRESSED OR HOPELESS: 0
1. LITTLE INTEREST OR PLEASURE IN DOING THINGS: 0
SUM OF ALL RESPONSES TO PHQ QUESTIONS 1-9: 0

## 2018-07-03 ENCOUNTER — HOSPITAL ENCOUNTER (OUTPATIENT)
Facility: MEDICAL CENTER | Age: 73
Discharge: HOME | End: 2018-07-03
Payer: COMMERCIAL

## 2018-07-03 DIAGNOSIS — K90.89 OTHER SPECIFIED INTESTINAL MALABSORPTION: ICD-10-CM

## 2018-07-03 DIAGNOSIS — E61.1 IRON DEFICIENCY: ICD-10-CM

## 2018-07-03 DIAGNOSIS — D64.9 ANEMIA, UNSPECIFIED TYPE: ICD-10-CM

## 2018-07-05 LAB
ABSOLUTE EOS #: 0.2 K/UL (ref 0–0.4)
ABSOLUTE IMMATURE GRANULOCYTE: ABNORMAL K/UL (ref 0–0.3)
ABSOLUTE LYMPH #: 1.6 K/UL (ref 1–4.8)
ABSOLUTE MONO #: 0.5 K/UL (ref 0.2–0.8)
ABSOLUTE RETIC #: 0.07 M/UL (ref 0.02–0.1)
ALBUMIN SERPL-MCNC: 4.4 G/DL (ref 3.5–5.2)
ALBUMIN/GLOBULIN RATIO: ABNORMAL (ref 1–2.5)
ALP BLD-CCNC: 86 U/L (ref 35–104)
ALT SERPL-CCNC: 8 U/L (ref 5–33)
ANION GAP SERPL CALCULATED.3IONS-SCNC: 13 MMOL/L (ref 9–17)
AST SERPL-CCNC: 21 U/L
BASOPHILS # BLD: 1 % (ref 0–2)
BASOPHILS ABSOLUTE: 0.1 K/UL (ref 0–0.2)
BILIRUB SERPL-MCNC: 0.16 MG/DL (ref 0.3–1.2)
BILIRUBIN DIRECT: <0.08 MG/DL
BILIRUBIN, INDIRECT: ABNORMAL MG/DL (ref 0–1)
BUN BLDV-MCNC: 35 MG/DL (ref 8–23)
BUN/CREAT BLD: 17 (ref 9–20)
CALCIUM SERPL-MCNC: 9.6 MG/DL (ref 8.6–10.4)
CHLORIDE BLD-SCNC: 94 MMOL/L (ref 98–107)
CO2: 35 MMOL/L (ref 20–31)
CREAT SERPL-MCNC: 2.08 MG/DL (ref 0.5–0.9)
DIFFERENTIAL TYPE: ABNORMAL
EOSINOPHILS RELATIVE PERCENT: 3 % (ref 1–4)
FERRITIN: 598 UG/L (ref 13–150)
FOLATE: 19.5 NG/ML
GFR AFRICAN AMERICAN: 29 ML/MIN
GFR NON-AFRICAN AMERICAN: 24 ML/MIN
GFR SERPL CREATININE-BSD FRML MDRD: ABNORMAL ML/MIN/{1.73_M2}
GFR SERPL CREATININE-BSD FRML MDRD: ABNORMAL ML/MIN/{1.73_M2}
GLOBULIN: ABNORMAL G/DL (ref 1.5–3.8)
GLUCOSE BLD-MCNC: 143 MG/DL (ref 70–99)
HAPTOGLOBIN: 228 MG/DL (ref 30–200)
HCT VFR BLD CALC: 29.4 % (ref 36–46)
HEMOGLOBIN: 9.6 G/DL (ref 12–16)
IMMATURE GRANULOCYTES: ABNORMAL %
IMMATURE RETIC FRACT: ABNORMAL %
IRON SATURATION: 25 % (ref 20–55)
IRON: 76 UG/DL (ref 37–145)
LYMPHOCYTES # BLD: 20 % (ref 24–44)
MCH RBC QN AUTO: 33.6 PG (ref 26–34)
MCHC RBC AUTO-ENTMCNC: 32.5 G/DL (ref 31–37)
MCV RBC AUTO: 103.4 FL (ref 80–100)
MONOCYTES # BLD: 7 % (ref 1–7)
NRBC AUTOMATED: ABNORMAL PER 100 WBC
PDW BLD-RTO: 18.3 % (ref 11.5–14.5)
PLATELET # BLD: 144 K/UL (ref 130–400)
PLATELET ESTIMATE: ABNORMAL
PMV BLD AUTO: 8.3 FL (ref 6–12)
POTASSIUM SERPL-SCNC: 4.4 MMOL/L (ref 3.7–5.3)
RBC # BLD: 2.84 M/UL (ref 4–5.2)
RBC # BLD: ABNORMAL 10*6/UL
RETIC %: 2.5 % (ref 0.5–2)
RETIC HEMOGLOBIN: ABNORMAL PG (ref 28.2–35.7)
SEG NEUTROPHILS: 69 % (ref 36–66)
SEGMENTED NEUTROPHILS ABSOLUTE COUNT: 5.4 K/UL (ref 1.8–7.7)
SODIUM BLD-SCNC: 142 MMOL/L (ref 135–144)
THYROXINE, FREE: 1.59 NG/DL (ref 0.93–1.7)
TOTAL IRON BINDING CAPACITY: 304 UG/DL (ref 250–450)
TOTAL PROTEIN: 6.8 G/DL (ref 6.4–8.3)
TSH SERPL DL<=0.05 MIU/L-ACNC: 3.35 MIU/L (ref 0.3–5)
UNSATURATED IRON BINDING CAPACITY: 228 UG/DL (ref 112–347)
VITAMIN B-12: >2000 PG/ML (ref 232–1245)
WBC # BLD: 7.7 K/UL (ref 3.5–11)
WBC # BLD: ABNORMAL 10*3/UL

## 2018-07-05 PROCEDURE — 36415 COLL VENOUS BLD VENIPUNCTURE: CPT

## 2018-07-05 PROCEDURE — 80076 HEPATIC FUNCTION PANEL: CPT

## 2018-07-05 PROCEDURE — 83540 ASSAY OF IRON: CPT

## 2018-07-05 PROCEDURE — 85045 AUTOMATED RETICULOCYTE COUNT: CPT

## 2018-07-05 PROCEDURE — 83550 IRON BINDING TEST: CPT

## 2018-07-05 PROCEDURE — 82746 ASSAY OF FOLIC ACID SERUM: CPT

## 2018-07-05 PROCEDURE — 85025 COMPLETE CBC W/AUTO DIFF WBC: CPT

## 2018-07-05 PROCEDURE — 80048 BASIC METABOLIC PNL TOTAL CA: CPT

## 2018-07-05 PROCEDURE — 84439 ASSAY OF FREE THYROXINE: CPT

## 2018-07-05 PROCEDURE — 83010 ASSAY OF HAPTOGLOBIN QUANT: CPT

## 2018-07-05 PROCEDURE — 82607 VITAMIN B-12: CPT

## 2018-07-05 PROCEDURE — 84443 ASSAY THYROID STIM HORMONE: CPT

## 2018-07-05 PROCEDURE — 82728 ASSAY OF FERRITIN: CPT

## 2018-07-06 ENCOUNTER — PATIENT MESSAGE (OUTPATIENT)
Dept: FAMILY MEDICINE CLINIC | Age: 73
End: 2018-07-06

## 2018-07-06 DIAGNOSIS — F41.9 ANXIETY: ICD-10-CM

## 2018-07-06 RX ORDER — ALPRAZOLAM 0.25 MG/1
0.25 TABLET ORAL 3 TIMES DAILY PRN
Qty: 90 TABLET | Refills: 0 | Status: ON HOLD | OUTPATIENT
Start: 2018-07-06 | End: 2018-09-27

## 2018-07-07 ENCOUNTER — PATIENT MESSAGE (OUTPATIENT)
Dept: FAMILY MEDICINE CLINIC | Age: 73
End: 2018-07-07

## 2018-07-10 ENCOUNTER — HOSPITAL ENCOUNTER (OUTPATIENT)
Facility: MEDICAL CENTER | Age: 73
End: 2018-07-10
Payer: COMMERCIAL

## 2018-07-12 ENCOUNTER — TELEPHONE (OUTPATIENT)
Dept: ONCOLOGY | Age: 73
End: 2018-07-12

## 2018-07-12 ENCOUNTER — OFFICE VISIT (OUTPATIENT)
Dept: ONCOLOGY | Age: 73
End: 2018-07-12
Payer: COMMERCIAL

## 2018-07-12 VITALS
HEART RATE: 63 BPM | BODY MASS INDEX: 34.58 KG/M2 | TEMPERATURE: 97.5 F | DIASTOLIC BLOOD PRESSURE: 88 MMHG | SYSTOLIC BLOOD PRESSURE: 135 MMHG | RESPIRATION RATE: 18 BRPM | WEIGHT: 183 LBS

## 2018-07-12 DIAGNOSIS — N18.4 ANEMIA OF CHRONIC RENAL FAILURE, STAGE 4 (SEVERE) (HCC): Primary | ICD-10-CM

## 2018-07-12 DIAGNOSIS — E11.22 CKD STAGE 4 DUE TO TYPE 2 DIABETES MELLITUS (HCC): Chronic | ICD-10-CM

## 2018-07-12 DIAGNOSIS — D63.1 ANEMIA OF CHRONIC RENAL FAILURE, STAGE 4 (SEVERE) (HCC): Primary | ICD-10-CM

## 2018-07-12 DIAGNOSIS — N18.4 CKD STAGE 4 DUE TO TYPE 2 DIABETES MELLITUS (HCC): Chronic | ICD-10-CM

## 2018-07-12 PROCEDURE — 99214 OFFICE O/P EST MOD 30 MIN: CPT | Performed by: INTERNAL MEDICINE

## 2018-07-12 PROCEDURE — 1090F PRES/ABSN URINE INCON ASSESS: CPT | Performed by: INTERNAL MEDICINE

## 2018-07-12 PROCEDURE — 1111F DSCHRG MED/CURRENT MED MERGE: CPT | Performed by: INTERNAL MEDICINE

## 2018-07-12 PROCEDURE — G8598 ASA/ANTIPLAT THER USED: HCPCS | Performed by: INTERNAL MEDICINE

## 2018-07-12 PROCEDURE — G8417 CALC BMI ABV UP PARAM F/U: HCPCS | Performed by: INTERNAL MEDICINE

## 2018-07-12 PROCEDURE — G8428 CUR MEDS NOT DOCUMENT: HCPCS | Performed by: INTERNAL MEDICINE

## 2018-07-12 PROCEDURE — 3017F COLORECTAL CA SCREEN DOC REV: CPT | Performed by: INTERNAL MEDICINE

## 2018-07-12 PROCEDURE — G8400 PT W/DXA NO RESULTS DOC: HCPCS | Performed by: INTERNAL MEDICINE

## 2018-07-12 PROCEDURE — 4040F PNEUMOC VAC/ADMIN/RCVD: CPT | Performed by: INTERNAL MEDICINE

## 2018-07-12 PROCEDURE — 1123F ACP DISCUSS/DSCN MKR DOCD: CPT | Performed by: INTERNAL MEDICINE

## 2018-07-12 PROCEDURE — 3046F HEMOGLOBIN A1C LEVEL >9.0%: CPT | Performed by: INTERNAL MEDICINE

## 2018-07-12 PROCEDURE — 1036F TOBACCO NON-USER: CPT | Performed by: INTERNAL MEDICINE

## 2018-07-12 PROCEDURE — 1101F PT FALLS ASSESS-DOCD LE1/YR: CPT | Performed by: INTERNAL MEDICINE

## 2018-07-12 PROCEDURE — 2022F DILAT RTA XM EVC RTNOPTHY: CPT | Performed by: INTERNAL MEDICINE

## 2018-07-13 ENCOUNTER — TELEPHONE (OUTPATIENT)
Dept: ONCOLOGY | Age: 73
End: 2018-07-13

## 2018-07-13 DIAGNOSIS — N20.0 NEPHROLITHIASIS: Chronic | ICD-10-CM

## 2018-07-13 DIAGNOSIS — N18.4 CKD STAGE 4 DUE TO TYPE 2 DIABETES MELLITUS (HCC): Primary | Chronic | ICD-10-CM

## 2018-07-13 DIAGNOSIS — E11.22 CKD STAGE 4 DUE TO TYPE 2 DIABETES MELLITUS (HCC): Primary | Chronic | ICD-10-CM

## 2018-07-13 NOTE — PROGRESS NOTES
_           Chief Complaint   Patient presents with    Follow-up     patient would like to review status of disease     DIAGNOSIS:       Previous labs with Macrocytic anemia. Previous Evidence of Iron deficiency. History of thrombocytopenia. Repeated labs today with normocytic anemia. Anemia of chronic renal insufficiency stage IV. Multiple co-morbidities as listed. CURRENT THERAPY:     IV Iron infusion May 2018. Start Aranesp July 2018 for Hb below 10. BRIEF CASE HISTORY:      Ms. Ellis Hall is a very pleasant 81 y.o. female with history of multiple co-morbidities as listed. She has increasing weakness and fatigue. she presented to the hospital with what she thought was vaginal bleeding and possible hematuria. She was evaluated by GYN and urology. Cystoscopy was done. She had urethral lesion. She was noted to have severe anemia. She denies active bleeding at the present time. No hematemesis or melena. No hematochezia. Patient denies chest pain or palpitation. No SOB. She uses a wheelchair. INTERIM HISTORY:   The patient is seen for follow up anemia. She received Iron infusion in May 2018. She feels much better. More strength and energy. No CP. No headache or dizziness. No weight loss. No active bleeding. Hb is improving. PAST MEDICAL HISTORY: has a past medical history of Acute on chronic diastolic congestive heart failure ([address] Anesthesia complication; Asthma; Atrial fibrillation ([address] Cataracts, bilateral; Cellulitis; Cerebral artery occlusion with cerebral infarction ([address] Chronic kidney disease; Constipation; COPD (chronic obstructive pulmonary disease) ([address] Difficult intravenous access; Diverticulosis; DM2 (diabetes mellitus, type 2) ([address] Full dentures; GERD (gastroesophageal reflux disease); Headache(784.0); [place] (hard of hearing); Hyperlipidemia;  Hyperplastic polyp of

## 2018-07-15 ENCOUNTER — PATIENT MESSAGE (OUTPATIENT)
Dept: FAMILY MEDICINE CLINIC | Age: 73
End: 2018-07-15

## 2018-07-15 DIAGNOSIS — M48.02 SPINAL STENOSIS IN CERVICAL REGION: ICD-10-CM

## 2018-07-16 DIAGNOSIS — G89.4 CHRONIC PAIN SYNDROME: ICD-10-CM

## 2018-07-16 RX ORDER — HYDROCODONE BITARTRATE AND ACETAMINOPHEN 5; 325 MG/1; MG/1
1 TABLET ORAL 2 TIMES DAILY PRN
Qty: 60 TABLET | Refills: 0 | Status: SHIPPED | OUTPATIENT
Start: 2018-07-16 | End: 2018-08-15

## 2018-07-16 RX ORDER — HYDROCODONE BITARTRATE AND ACETAMINOPHEN 5; 325 MG/1; MG/1
1 TABLET ORAL EVERY 8 HOURS PRN
Qty: 6 TABLET | Refills: 0 | OUTPATIENT
Start: 2018-07-16 | End: 2018-07-19

## 2018-07-17 ENCOUNTER — OFFICE VISIT (OUTPATIENT)
Dept: FAMILY MEDICINE CLINIC | Age: 73
End: 2018-07-17
Payer: COMMERCIAL

## 2018-07-17 VITALS
RESPIRATION RATE: 18 BRPM | WEIGHT: 185 LBS | BODY MASS INDEX: 34.93 KG/M2 | SYSTOLIC BLOOD PRESSURE: 135 MMHG | OXYGEN SATURATION: 93 % | HEIGHT: 61 IN | DIASTOLIC BLOOD PRESSURE: 58 MMHG | HEART RATE: 79 BPM

## 2018-07-17 DIAGNOSIS — R60.0 EDEMA OF BOTH LEGS: ICD-10-CM

## 2018-07-17 DIAGNOSIS — N18.30 CONTROLLED TYPE 2 DIABETES MELLITUS WITH STAGE 3 CHRONIC KIDNEY DISEASE, WITHOUT LONG-TERM CURRENT USE OF INSULIN (HCC): Primary | ICD-10-CM

## 2018-07-17 DIAGNOSIS — E11.22 CONTROLLED TYPE 2 DIABETES MELLITUS WITH STAGE 3 CHRONIC KIDNEY DISEASE, WITHOUT LONG-TERM CURRENT USE OF INSULIN (HCC): Primary | ICD-10-CM

## 2018-07-17 DIAGNOSIS — B37.9 CANDIDIASIS: ICD-10-CM

## 2018-07-17 PROCEDURE — 2022F DILAT RTA XM EVC RTNOPTHY: CPT | Performed by: FAMILY MEDICINE

## 2018-07-17 PROCEDURE — G8400 PT W/DXA NO RESULTS DOC: HCPCS | Performed by: FAMILY MEDICINE

## 2018-07-17 PROCEDURE — 3046F HEMOGLOBIN A1C LEVEL >9.0%: CPT | Performed by: FAMILY MEDICINE

## 2018-07-17 PROCEDURE — 99214 OFFICE O/P EST MOD 30 MIN: CPT | Performed by: FAMILY MEDICINE

## 2018-07-17 PROCEDURE — 1101F PT FALLS ASSESS-DOCD LE1/YR: CPT | Performed by: FAMILY MEDICINE

## 2018-07-17 PROCEDURE — 1090F PRES/ABSN URINE INCON ASSESS: CPT | Performed by: FAMILY MEDICINE

## 2018-07-17 PROCEDURE — G8427 DOCREV CUR MEDS BY ELIG CLIN: HCPCS | Performed by: FAMILY MEDICINE

## 2018-07-17 PROCEDURE — 1036F TOBACCO NON-USER: CPT | Performed by: FAMILY MEDICINE

## 2018-07-17 PROCEDURE — 4040F PNEUMOC VAC/ADMIN/RCVD: CPT | Performed by: FAMILY MEDICINE

## 2018-07-17 PROCEDURE — G8598 ASA/ANTIPLAT THER USED: HCPCS | Performed by: FAMILY MEDICINE

## 2018-07-17 PROCEDURE — 3017F COLORECTAL CA SCREEN DOC REV: CPT | Performed by: FAMILY MEDICINE

## 2018-07-17 PROCEDURE — G8417 CALC BMI ABV UP PARAM F/U: HCPCS | Performed by: FAMILY MEDICINE

## 2018-07-17 PROCEDURE — 1123F ACP DISCUSS/DSCN MKR DOCD: CPT | Performed by: FAMILY MEDICINE

## 2018-07-17 RX ORDER — FLUCONAZOLE 150 MG/1
150 TABLET ORAL DAILY
Qty: 7 TABLET | Refills: 0 | Status: ON HOLD | OUTPATIENT
Start: 2018-07-17 | End: 2018-08-22 | Stop reason: ALTCHOICE

## 2018-07-17 RX ORDER — SPIRONOLACTONE 50 MG/1
50 TABLET, FILM COATED ORAL DAILY
Qty: 90 TABLET | Refills: 3 | Status: ON HOLD | OUTPATIENT
Start: 2018-07-17 | End: 2018-09-07 | Stop reason: HOSPADM

## 2018-07-17 RX ORDER — NYSTATIN 100000 [USP'U]/G
POWDER TOPICAL
Qty: 56.7 G | Refills: 5 | Status: SHIPPED | OUTPATIENT
Start: 2018-07-17 | End: 2018-08-20

## 2018-07-17 NOTE — PROGRESS NOTES
Oxygen Concentrator Dx: Pneumonia, use as directed. (Patient taking differently: Dx: Pneumonia, use as directed. ON 24/7) 1 each 0     No current facility-administered medications for this visit. ALLERGIES:    Allergies   Allergen Reactions    Dye [Barium-Containing Compounds] Other (See Comments)     Cause Afib per     Pcn [Penicillins] Itching and Swelling    Red Dye Itching and Rash     This allergy is likely incorrect:  Per patient's  she has no issue with medications that have red dye in them or red food. He thinks this reaction was added to chart when the pt had a colonoscopy (possibly barium or iodine dye instead)       Social History   Substance Use Topics    Smoking status: Former Smoker     Packs/day: 2.00     Years: 15.00     Types: Cigarettes     Quit date: 10/31/1970    Smokeless tobacco: Never Used    Alcohol use 1.2 oz/week     2 Shots of liquor per week      Comment: 4 DRINKS A YEAR        LDL Cholesterol (mg/dL)   Date Value   08/26/2017 73     HDL (mg/dL)   Date Value   08/26/2017 30 (L)     BUN (mg/dL)   Date Value   07/05/2018 35 (H)     CREATININE (mg/dL)   Date Value   07/05/2018 2.08 (H)     Glucose (mg/dL)   Date Value   07/05/2018 143 (H)   03/07/2012 132 (H)     Hemoglobin A1C (%)   Date Value   12/22/2017 5.5     Microalb/Crt. Ratio (mcg/mg creat)   Date Value   11/09/2017 174 (H)              Subjective:      HPI  She is here today because she has a rash underneath her abdominal apron  She has some bruising on her abdomen and noticed 2 lumps in that area  She needed refills on some medications  And she is due for hemoglobin A1c for her diabetes    Review of Systems:     Constitutional: Negative for fever, appetite change and fatigue. Family social and medical history reviewed and unchanged     HENT: Negative. Negative for nosebleeds, trouble swallowing and neck pain. Eyes: Negative for photophobia and visual disturbance. Respiratory: Negative. Negative for chest tightness and shortness of breath. Cardiovascular: Negative. Negative for chest pain and leg swelling. Gastrointestinal: Negative. Negative for abdominal pain and blood in stool. Endocrine: Negative for cold intolerance and polyuria. Genitourinary: Negative for dysuria and hematuria. Musculoskeletal: Negative. Skin: Negative for rash. Allergic/Immunologic: Negative. Neurological: Negative. Negative for dizziness, weakness and numbness. Hematological: Negative. Negative for adenopathy. Does not bruise/bleed easily. Psychiatric/Behavioral: Negative for sleep disturbance, dysphoric mood and  decreased concentration. The patient is not nervous/anxious. Objective:     Physical Exam:     Nursing note and vitals reviewed. BP (!) 135/58   Pulse 79   Resp 18   Ht 5' 0.98\" (1.549 m)   Wt 185 lb (83.9 kg)   LMP 04/03/2004 (Within Years)   SpO2 93%   Breastfeeding? No   BMI 34.97 kg/m²   Constitutional: She is oriented to person, place, and time. She   appears well-developed and well-nourished. HENT:   Head: Normocephalic and atraumatic. Right Ear: External ear normal. Tympanic membrane is not erythematous. No middle ear effusion. Left Ear: External ear normal. Tympanic membrane is not erythematous. No middle ear effusion. Nose: No mucosal edema. Mouth/Throat: Oropharynx is clear and moist. No posterior oropharyngeal erythema. Eyes: Conjunctivae and EOM are normal. Pupils are equal, round, and reactive to light. Neck: Normal range of motion. Neck supple. No thyromegaly present. Cardiovascular: Normal rate, regular rhythm and normal heart sounds. No murmur heard. Pulmonary/Chest: Effort normal and breath sounds normal. She has no wheezes. Shehas no rales. Abdominal: Soft. Bowel sounds are normal. She exhibits no distension and no mass. There is no tenderness. There is no rebound and no guarding.   ecchymotic area on her abdomen with 2 firm

## 2018-07-20 RX ORDER — ATORVASTATIN CALCIUM 20 MG/1
TABLET, FILM COATED ORAL
Qty: 90 TABLET | Refills: 2 | Status: SHIPPED | OUTPATIENT
Start: 2018-07-20 | End: 2019-04-16 | Stop reason: SDUPTHER

## 2018-07-24 ENCOUNTER — HOSPITAL ENCOUNTER (OUTPATIENT)
Dept: INFUSION THERAPY | Facility: MEDICAL CENTER | Age: 73
Discharge: HOME | End: 2018-07-24
Payer: COMMERCIAL

## 2018-07-24 ENCOUNTER — HOSPITAL ENCOUNTER (OUTPATIENT)
Facility: MEDICAL CENTER | Age: 73
Discharge: HOME | End: 2018-07-24
Payer: COMMERCIAL

## 2018-07-24 VITALS
DIASTOLIC BLOOD PRESSURE: 34 MMHG | SYSTOLIC BLOOD PRESSURE: 85 MMHG | TEMPERATURE: 97.6 F | HEART RATE: 57 BPM | RESPIRATION RATE: 18 BRPM

## 2018-07-24 DIAGNOSIS — E61.1 IRON DEFICIENCY: ICD-10-CM

## 2018-07-24 DIAGNOSIS — D63.1 ANEMIA OF CHRONIC RENAL FAILURE, STAGE 4 (SEVERE) (HCC): ICD-10-CM

## 2018-07-24 DIAGNOSIS — K90.89 OTHER SPECIFIED INTESTINAL MALABSORPTION: ICD-10-CM

## 2018-07-24 DIAGNOSIS — N18.4 ANEMIA OF CHRONIC RENAL FAILURE, STAGE 4 (SEVERE) (HCC): ICD-10-CM

## 2018-07-24 DIAGNOSIS — D64.9 ANEMIA, UNSPECIFIED TYPE: ICD-10-CM

## 2018-07-24 LAB
ABSOLUTE EOS #: 0.2 K/UL (ref 0–0.4)
ABSOLUTE IMMATURE GRANULOCYTE: ABNORMAL K/UL (ref 0–0.3)
ABSOLUTE LYMPH #: 1.2 K/UL (ref 1–4.8)
ABSOLUTE MONO #: 0.4 K/UL (ref 0.2–0.8)
BASOPHILS # BLD: 0 % (ref 0–2)
BASOPHILS ABSOLUTE: 0 K/UL (ref 0–0.2)
DIFFERENTIAL TYPE: ABNORMAL
EOSINOPHILS RELATIVE PERCENT: 2 % (ref 1–4)
HCT VFR BLD CALC: 28.8 % (ref 36–46)
HEMOGLOBIN: 9.3 G/DL (ref 12–16)
IMMATURE GRANULOCYTES: ABNORMAL %
LYMPHOCYTES # BLD: 16 % (ref 24–44)
MCH RBC QN AUTO: 33.3 PG (ref 26–34)
MCHC RBC AUTO-ENTMCNC: 32.3 G/DL (ref 31–37)
MCV RBC AUTO: 103 FL (ref 80–100)
MONOCYTES # BLD: 5 % (ref 1–7)
NRBC AUTOMATED: ABNORMAL PER 100 WBC
PDW BLD-RTO: 15.9 % (ref 11.5–14.5)
PLATELET # BLD: 164 K/UL (ref 130–400)
PLATELET ESTIMATE: ABNORMAL
PMV BLD AUTO: 8.2 FL (ref 6–12)
RBC # BLD: 2.8 M/UL (ref 4–5.2)
RBC # BLD: ABNORMAL 10*6/UL
SEG NEUTROPHILS: 77 % (ref 36–66)
SEGMENTED NEUTROPHILS ABSOLUTE COUNT: 5.8 K/UL (ref 1.8–7.7)
WBC # BLD: 7.6 K/UL (ref 3.5–11)
WBC # BLD: ABNORMAL 10*3/UL

## 2018-07-24 PROCEDURE — 96372 THER/PROPH/DIAG INJ SC/IM: CPT

## 2018-07-24 PROCEDURE — 85025 COMPLETE CBC W/AUTO DIFF WBC: CPT

## 2018-07-24 PROCEDURE — 6360000002 HC RX W HCPCS: Performed by: INTERNAL MEDICINE

## 2018-07-24 PROCEDURE — 36415 COLL VENOUS BLD VENIPUNCTURE: CPT

## 2018-07-24 RX ADMIN — DARBEPOETIN ALFA 100 MCG: 100 INJECTION, SOLUTION INTRAVENOUS; SUBCUTANEOUS at 11:10

## 2018-07-24 NOTE — PROGRESS NOTES
Rashid Mckeon her in wheel chair with care giver. Pt had lab work done in lab. Hemoglobin of 9.3, ordered aranesp from pharmacy. Explained to pt parameters and reasoning for cut off. Injection given without difficulty. Pt scheduled back here on 08/14/18.

## 2018-08-13 ENCOUNTER — OFFICE VISIT (OUTPATIENT)
Dept: FAMILY MEDICINE CLINIC | Age: 73
End: 2018-08-13
Payer: COMMERCIAL

## 2018-08-13 VITALS
RESPIRATION RATE: 18 BRPM | TEMPERATURE: 97.3 F | BODY MASS INDEX: 34.04 KG/M2 | HEIGHT: 62 IN | HEART RATE: 62 BPM | DIASTOLIC BLOOD PRESSURE: 50 MMHG | SYSTOLIC BLOOD PRESSURE: 133 MMHG | OXYGEN SATURATION: 98 % | WEIGHT: 185 LBS

## 2018-08-13 DIAGNOSIS — I10 ESSENTIAL HYPERTENSION: ICD-10-CM

## 2018-08-13 DIAGNOSIS — L03.115 CELLULITIS OF RIGHT LOWER LEG: Primary | ICD-10-CM

## 2018-08-13 DIAGNOSIS — R53.82 CHRONIC FATIGUE: ICD-10-CM

## 2018-08-13 DIAGNOSIS — E11.9 TYPE 2 DIABETES MELLITUS WITHOUT COMPLICATION, WITHOUT LONG-TERM CURRENT USE OF INSULIN (HCC): ICD-10-CM

## 2018-08-13 PROCEDURE — 3046F HEMOGLOBIN A1C LEVEL >9.0%: CPT | Performed by: FAMILY MEDICINE

## 2018-08-13 PROCEDURE — 99214 OFFICE O/P EST MOD 30 MIN: CPT | Performed by: FAMILY MEDICINE

## 2018-08-13 PROCEDURE — 1123F ACP DISCUSS/DSCN MKR DOCD: CPT | Performed by: FAMILY MEDICINE

## 2018-08-13 PROCEDURE — 4040F PNEUMOC VAC/ADMIN/RCVD: CPT | Performed by: FAMILY MEDICINE

## 2018-08-13 PROCEDURE — G8417 CALC BMI ABV UP PARAM F/U: HCPCS | Performed by: FAMILY MEDICINE

## 2018-08-13 PROCEDURE — G8400 PT W/DXA NO RESULTS DOC: HCPCS | Performed by: FAMILY MEDICINE

## 2018-08-13 PROCEDURE — 1101F PT FALLS ASSESS-DOCD LE1/YR: CPT | Performed by: FAMILY MEDICINE

## 2018-08-13 PROCEDURE — 3017F COLORECTAL CA SCREEN DOC REV: CPT | Performed by: FAMILY MEDICINE

## 2018-08-13 PROCEDURE — 1036F TOBACCO NON-USER: CPT | Performed by: FAMILY MEDICINE

## 2018-08-13 PROCEDURE — G8598 ASA/ANTIPLAT THER USED: HCPCS | Performed by: FAMILY MEDICINE

## 2018-08-13 PROCEDURE — G8427 DOCREV CUR MEDS BY ELIG CLIN: HCPCS | Performed by: FAMILY MEDICINE

## 2018-08-13 PROCEDURE — 2022F DILAT RTA XM EVC RTNOPTHY: CPT | Performed by: FAMILY MEDICINE

## 2018-08-13 PROCEDURE — 1090F PRES/ABSN URINE INCON ASSESS: CPT | Performed by: FAMILY MEDICINE

## 2018-08-13 RX ORDER — SULFAMETHOXAZOLE AND TRIMETHOPRIM 800; 160 MG/1; MG/1
1 TABLET ORAL 2 TIMES DAILY
Qty: 20 TABLET | Refills: 0 | Status: ON HOLD | OUTPATIENT
Start: 2018-08-13 | End: 2018-09-07 | Stop reason: HOSPADM

## 2018-08-13 ASSESSMENT — PATIENT HEALTH QUESTIONNAIRE - PHQ9
2. FEELING DOWN, DEPRESSED OR HOPELESS: 0
SUM OF ALL RESPONSES TO PHQ QUESTIONS 1-9: 0
SUM OF ALL RESPONSES TO PHQ QUESTIONS 1-9: 0
1. LITTLE INTEREST OR PLEASURE IN DOING THINGS: 0
SUM OF ALL RESPONSES TO PHQ9 QUESTIONS 1 & 2: 0

## 2018-08-13 NOTE — PROGRESS NOTES
[facility] PHYSICIANS  COMPREHENSIVE CARE  [facility] [address] [facility] [address]  Dept: [phone]      Sherly Lopez is a 72 y.o. female who presents today for follow up on her  medical conditions as noted below. Chief Complaint   Patient presents with    Diabetes    Nausea     Ongoing for past 3 weeks since kidney hormone injection done on 7/31/2018.     Fatigue     Ongoing since injection on 7/31/2018    Leg Pain     Right leg red and swollen for past 2 weeks    Health Maintenance     Patient declines at this time       Patient Active Problem List:     Controlled type 2 diabetes mellitus with stage 3 chronic kidney disease, without long-term current use of insulin (HCC)     Hyperlipidemia     Essential hypertension     Chronic leg pain     Chronic atrial fibrillation (HCC)     Asthma     Gastroesophageal reflux disease without esophagitis     Sleep apnea     ECTRO on CPAP     Type 2 diabetes mellitus without complication, without long-term current use of insulin (HCC)     Spinal stenosis in cervical region     Hypomagnesemia     Hyperphosphaturia     Postoperative anemia due to acute blood loss     Hypocalcemia     Aspiration pneumonitis (HCC)     Parkinson's disease ([address]     Syncope and collapse     Gastrointestinal hemorrhage     Acute renal failure (HCC)     Acute cystitis without hematuria     Mental status change     Iron deficiency anemia due to chronic blood loss     Morbid obesity with BMI of 40.0-44.9, adult ([address]     HCAP (healthcare-associated pneumonia)     Acute respiratory failure with hypercapnia (HCC)     Acute on chronic diastolic congestive heart failure (HCC)     Hyperplastic polyp of intestine     Diverticulosis     Panlobular emphysema (HCC)     Rapid atrial fibrillation (HCC)     Atrial fibrillation with RVR (HCC)     CKD (chronic kidney disease) stage 3, GFR 30-59 ml/min     KRISTIN (acute kidney injury) ([address]     Anemia in chronic kidney disease     Acute on chronic (XANAX) 0.25 MG tablet Take 1 tablet by mouth 3 times daily as needed for Anxiety for up to 90 days. . 90 tablet 0    linagliptin (TRADJENTA) 5 MG tablet Take 5 mg by mouth daily      butalbital-acetaminophen-caffeine (FIORICET, ESGIC) -40 MG per tablet Take 1 tablet by mouth every 4 hours as needed for Headaches 15 tablet 0    nystatin (MYCOSTATIN) 108525 UNIT/GM cream Apply topically 2 times daily.       furosemide (LASIX) 20 MG tablet Take 1 tablet by mouth daily (Patient taking differently: Take 40 mg by mouth daily ) 60 tablet 3    predniSONE (DELTASONE) 10 MG tablet 4 daily for 3 days then 3 daily for 3 days then 2 daily for 3 days then 1 daily until gone 30 tablet 0    carbidopa-levodopa (SINEMET CR)  MG per extended release tablet Take 1 tablet by mouth 4 times daily      calcium citrate (CALCITRATE) 250 MG TABS tablet Take 250 mg by mouth 3 times daily      magnesium oxide (MAG-OX) 400 MG tablet Take 400 mg by mouth daily      polyethylene glycol (GLYCOLAX) packet Take 17 g by mouth daily as needed for Constipation      senna (SENOKOT) 8.6 MG tablet Take 2 tablets by mouth daily      conjugated estrogens (PREMARIN) 0.625 MG/GM vaginal cream Place 0.5 g vaginally every other day 1 Tube 3    metoprolol tartrate (LOPRESSOR) 25 MG tablet Take 0.5 tablets by mouth 2 times daily 60 tablet 3    aspirin 81 MG tablet Take 81 mg by mouth daily LAST DOSE 03/31/2018      rOPINIRole (REQUIP) 2 MG tablet Take 2 mg by mouth 3 times daily      pantoprazole (PROTONIX) 40 MG tablet Take 1 tablet by mouth 2 times daily (before meals) 180 tablet 3    BREO ELLIPTA 100-25 MCG/INH AEPB inhaler Inhale 1 puff into the lungs daily 3 each 3    ferrous sulfate 325 (65 Fe) MG EC tablet Take 1 tablet by mouth 2 times daily (with meals) 90 tablet 3    vitamin D (CHOLECALCIFEROL) 5000 units CAPS capsule Take 5,000 Units by mouth daily      rasagiline mesylate 1 MG TABS Take 1 tablet by mouth daily     

## 2018-08-14 ENCOUNTER — HOSPITAL ENCOUNTER (OUTPATIENT)
Facility: MEDICAL CENTER | Age: 73
Discharge: HOME | End: 2018-08-14
Payer: COMMERCIAL

## 2018-08-14 ENCOUNTER — HOSPITAL ENCOUNTER (OUTPATIENT)
Dept: INFUSION THERAPY | Facility: MEDICAL CENTER | Age: 73
Discharge: HOME | End: 2018-08-14
Payer: COMMERCIAL

## 2018-08-14 VITALS
TEMPERATURE: 97.4 F | DIASTOLIC BLOOD PRESSURE: 53 MMHG | HEART RATE: 60 BPM | RESPIRATION RATE: 18 BRPM | SYSTOLIC BLOOD PRESSURE: 133 MMHG

## 2018-08-14 DIAGNOSIS — N18.4 CKD STAGE 4 DUE TO TYPE 2 DIABETES MELLITUS (HCC): Chronic | ICD-10-CM

## 2018-08-14 DIAGNOSIS — E11.22 CKD STAGE 4 DUE TO TYPE 2 DIABETES MELLITUS (HCC): Chronic | ICD-10-CM

## 2018-08-14 DIAGNOSIS — D63.1 ANEMIA OF CHRONIC RENAL FAILURE, STAGE 4 (SEVERE) (HCC): ICD-10-CM

## 2018-08-14 DIAGNOSIS — E61.1 IRON DEFICIENCY: ICD-10-CM

## 2018-08-14 DIAGNOSIS — N18.4 ANEMIA OF CHRONIC RENAL FAILURE, STAGE 4 (SEVERE) (HCC): ICD-10-CM

## 2018-08-14 DIAGNOSIS — K90.89 OTHER SPECIFIED INTESTINAL MALABSORPTION: ICD-10-CM

## 2018-08-14 DIAGNOSIS — N20.0 NEPHROLITHIASIS: Chronic | ICD-10-CM

## 2018-08-14 LAB
ABSOLUTE EOS #: 0.1 K/UL (ref 0–0.4)
ABSOLUTE IMMATURE GRANULOCYTE: ABNORMAL K/UL (ref 0–0.3)
ABSOLUTE LYMPH #: 1.4 K/UL (ref 1–4.8)
ABSOLUTE MONO #: 0.5 K/UL (ref 0.2–0.8)
BASOPHILS # BLD: 0 % (ref 0–2)
BASOPHILS ABSOLUTE: 0 K/UL (ref 0–0.2)
DIFFERENTIAL TYPE: ABNORMAL
EOSINOPHILS RELATIVE PERCENT: 2 % (ref 1–4)
HCT VFR BLD CALC: 30.2 % (ref 36–46)
HEMOGLOBIN: 9.9 G/DL (ref 12–16)
IMMATURE GRANULOCYTES: ABNORMAL %
LYMPHOCYTES # BLD: 18 % (ref 24–44)
MCH RBC QN AUTO: 33.8 PG (ref 26–34)
MCHC RBC AUTO-ENTMCNC: 32.7 G/DL (ref 31–37)
MCV RBC AUTO: 103.2 FL (ref 80–100)
MONOCYTES # BLD: 6 % (ref 1–7)
NRBC AUTOMATED: ABNORMAL PER 100 WBC
PDW BLD-RTO: 14.8 % (ref 11.5–14.5)
PLATELET # BLD: 116 K/UL (ref 130–400)
PLATELET ESTIMATE: ABNORMAL
PMV BLD AUTO: 8.1 FL (ref 6–12)
RBC # BLD: 2.93 M/UL (ref 4–5.2)
RBC # BLD: ABNORMAL 10*6/UL
SEG NEUTROPHILS: 74 % (ref 36–66)
SEGMENTED NEUTROPHILS ABSOLUTE COUNT: 5.7 K/UL (ref 1.8–7.7)
WBC # BLD: 7.8 K/UL (ref 3.5–11)
WBC # BLD: ABNORMAL 10*3/UL

## 2018-08-14 PROCEDURE — 36415 COLL VENOUS BLD VENIPUNCTURE: CPT

## 2018-08-14 PROCEDURE — 96372 THER/PROPH/DIAG INJ SC/IM: CPT

## 2018-08-14 PROCEDURE — 6360000002 HC RX W HCPCS: Performed by: INTERNAL MEDICINE

## 2018-08-14 PROCEDURE — 85025 COMPLETE CBC W/AUTO DIFF WBC: CPT

## 2018-08-14 RX ADMIN — DARBEPOETIN ALFA 100 MCG: 100 INJECTION, SOLUTION INTRAVENOUS; SUBCUTANEOUS at 10:27

## 2018-08-14 NOTE — PROGRESS NOTES
Patient arrives per wheelchair with her caregiver, [facility], for aranesp injection. Orders reviewed. CDP results pending. HGB=9.9. Patient informed of result with understanding voiced and given copy of lab result. RTC 9/4/18. Appointment calendar given to patient and caregiver with understanding voiced. Discharged per wheelchair.

## 2018-08-20 ENCOUNTER — APPOINTMENT (OUTPATIENT)
Dept: CT IMAGING | Age: 73
DRG: 871 | End: 2018-08-20
Payer: COMMERCIAL

## 2018-08-20 ENCOUNTER — HOSPITAL ENCOUNTER (INPATIENT)
Age: 73
LOS: 18 days | Discharge: NURSING, REHAB OR LONG-TERM CARE | DRG: 871 | End: 2018-09-07
Attending: EMERGENCY MEDICINE | Admitting: INTERNAL MEDICINE
Payer: COMMERCIAL

## 2018-08-20 ENCOUNTER — APPOINTMENT (OUTPATIENT)
Dept: INTERVENTIONAL RADIOLOGY/VASCULAR | Age: 73
DRG: 871 | End: 2018-08-20
Payer: COMMERCIAL

## 2018-08-20 ENCOUNTER — APPOINTMENT (OUTPATIENT)
Dept: GENERAL RADIOLOGY | Age: 73
DRG: 871 | End: 2018-08-20
Payer: COMMERCIAL

## 2018-08-20 DIAGNOSIS — R11.0 NAUSEA: ICD-10-CM

## 2018-08-20 DIAGNOSIS — Z87.898: ICD-10-CM

## 2018-08-20 DIAGNOSIS — E86.0 DEHYDRATION: ICD-10-CM

## 2018-08-20 DIAGNOSIS — E16.2 HYPOGLYCEMIA: ICD-10-CM

## 2018-08-20 DIAGNOSIS — R65.10 SIRS (SYSTEMIC INFLAMMATORY RESPONSE SYNDROME) (HCC): ICD-10-CM

## 2018-08-20 DIAGNOSIS — N30.01 ACUTE CYSTITIS WITH HEMATURIA: ICD-10-CM

## 2018-08-20 DIAGNOSIS — E87.20 LACTIC ACIDEMIA: ICD-10-CM

## 2018-08-20 DIAGNOSIS — D64.9 CHRONIC ANEMIA: ICD-10-CM

## 2018-08-20 DIAGNOSIS — Z87.2 HISTORY OF CELLULITIS: ICD-10-CM

## 2018-08-20 DIAGNOSIS — N17.9 ACUTE RENAL FAILURE, UNSPECIFIED ACUTE RENAL FAILURE TYPE (HCC): Primary | ICD-10-CM

## 2018-08-20 PROBLEM — A41.9 SEPSIS (HCC): Status: ACTIVE | Noted: 2018-08-20

## 2018-08-20 PROBLEM — N39.0 URINARY TRACT INFECTION WITHOUT HEMATURIA: Status: RESOLVED | Noted: 2018-03-08 | Resolved: 2018-08-20

## 2018-08-20 PROBLEM — N36.2 URETHRAL CARUNCLE: Status: RESOLVED | Noted: 2018-03-08 | Resolved: 2018-08-20

## 2018-08-20 PROBLEM — A49.9 BACTERIAL UTI: Status: ACTIVE | Noted: 2018-08-20

## 2018-08-20 PROBLEM — N39.0 BACTERIAL UTI: Status: ACTIVE | Noted: 2018-08-20

## 2018-08-20 PROBLEM — N18.9 ACUTE KIDNEY INJURY SUPERIMPOSED ON CHRONIC KIDNEY DISEASE (HCC): Status: ACTIVE | Noted: 2018-08-20

## 2018-08-20 PROBLEM — R11.2 NAUSEA AND VOMITING IN ADULT: Status: ACTIVE | Noted: 2018-08-20

## 2018-08-20 PROBLEM — J96.12 CHRONIC RESPIRATORY FAILURE WITH HYPOXIA AND HYPERCAPNIA (HCC): Status: ACTIVE | Noted: 2017-12-17

## 2018-08-20 PROBLEM — J96.11 CHRONIC RESPIRATORY FAILURE WITH HYPOXIA AND HYPERCAPNIA (HCC): Status: ACTIVE | Noted: 2017-12-17

## 2018-08-20 LAB
-: ABNORMAL
ABSOLUTE EOS #: 0 K/UL (ref 0–0.4)
ABSOLUTE IMMATURE GRANULOCYTE: ABNORMAL K/UL (ref 0–0.3)
ABSOLUTE LYMPH #: 1.7 K/UL (ref 1–4.8)
ABSOLUTE MONO #: 0.6 K/UL (ref 0.2–0.8)
ALBUMIN SERPL-MCNC: 4.2 G/DL (ref 3.5–5.2)
ALBUMIN/GLOBULIN RATIO: ABNORMAL (ref 1–2.5)
ALP BLD-CCNC: 41 U/L (ref 35–104)
ALT SERPL-CCNC: 6 U/L (ref 5–33)
AMORPHOUS: ABNORMAL
AMYLASE: 51 U/L (ref 28–100)
ANION GAP SERPL CALCULATED.3IONS-SCNC: 27 MMOL/L (ref 9–17)
AST SERPL-CCNC: 21 U/L
BACTERIA: ABNORMAL
BASOPHILS # BLD: 0 % (ref 0–2)
BASOPHILS ABSOLUTE: 0 K/UL (ref 0–0.2)
BILIRUB SERPL-MCNC: 0.11 MG/DL (ref 0.3–1.2)
BILIRUBIN DIRECT: <0.08 MG/DL
BILIRUBIN URINE: NEGATIVE
BILIRUBIN, INDIRECT: ABNORMAL MG/DL (ref 0–1)
BUN BLDV-MCNC: 42 MG/DL (ref 8–23)
BUN/CREAT BLD: 10 (ref 9–20)
CALCIUM SERPL-MCNC: 10.2 MG/DL (ref 8.6–10.4)
CASTS UA: ABNORMAL /LPF
CHLORIDE BLD-SCNC: 93 MMOL/L (ref 98–107)
CO2: 19 MMOL/L (ref 20–31)
COLOR: YELLOW
COMMENT UA: ABNORMAL
CREAT SERPL-MCNC: 4.09 MG/DL (ref 0.5–0.9)
CRYSTALS, UA: ABNORMAL /HPF
DIFFERENTIAL TYPE: ABNORMAL
EKG ATRIAL RATE: 70 BPM
EKG P AXIS: 69 DEGREES
EKG P-R INTERVAL: 236 MS
EKG Q-T INTERVAL: 446 MS
EKG QRS DURATION: 98 MS
EKG QTC CALCULATION (BAZETT): 481 MS
EKG R AXIS: -21 DEGREES
EKG T AXIS: 57 DEGREES
EKG VENTRICULAR RATE: 70 BPM
EOSINOPHILS RELATIVE PERCENT: 0 % (ref 1–4)
EPITHELIAL CELLS UA: ABNORMAL /HPF (ref 0–5)
GFR AFRICAN AMERICAN: 13 ML/MIN
GFR NON-AFRICAN AMERICAN: 11 ML/MIN
GFR SERPL CREATININE-BSD FRML MDRD: ABNORMAL ML/MIN/{1.73_M2}
GFR SERPL CREATININE-BSD FRML MDRD: ABNORMAL ML/MIN/{1.73_M2}
GLOBULIN: ABNORMAL G/DL (ref 1.5–3.8)
GLUCOSE BLD-MCNC: 113 MG/DL (ref 65–105)
GLUCOSE BLD-MCNC: 50 MG/DL (ref 70–99)
GLUCOSE BLD-MCNC: 57 MG/DL (ref 65–105)
GLUCOSE BLD-MCNC: 75 MG/DL (ref 65–105)
GLUCOSE BLD-MCNC: 84 MG/DL (ref 65–105)
GLUCOSE BLD-MCNC: 93 MG/DL (ref 65–105)
GLUCOSE URINE: NEGATIVE
HCT VFR BLD CALC: 30.4 % (ref 36–46)
HEMOGLOBIN: 9.8 G/DL (ref 12–16)
IMMATURE GRANULOCYTES: ABNORMAL %
KETONES, URINE: NEGATIVE
LACTIC ACID, SEPSIS WHOLE BLOOD: ABNORMAL MMOL/L (ref 0.5–1.9)
LACTIC ACID, SEPSIS: 11.8 MMOL/L (ref 0.5–1.9)
LACTIC ACID: 10 MMOL/L (ref 0.5–2.2)
LACTIC ACID: 10.8 MMOL/L (ref 0.5–2.2)
LEUKOCYTE ESTERASE, URINE: ABNORMAL
LIPASE: 31 U/L (ref 13–60)
LYMPHOCYTES # BLD: 23 % (ref 24–44)
MAGNESIUM: 2.1 MG/DL (ref 1.6–2.6)
MCH RBC QN AUTO: 33.6 PG (ref 26–34)
MCHC RBC AUTO-ENTMCNC: 32.3 G/DL (ref 31–37)
MCV RBC AUTO: 104.2 FL (ref 80–100)
MONOCYTES # BLD: 8 % (ref 1–7)
MUCUS: ABNORMAL
MYOGLOBIN: 167 NG/ML (ref 25–58)
NITRITE, URINE: NEGATIVE
NRBC AUTOMATED: ABNORMAL PER 100 WBC
OTHER OBSERVATIONS UA: ABNORMAL
PDW BLD-RTO: 14.9 % (ref 11.5–14.5)
PH UA: 6.5 (ref 5–8)
PLATELET # BLD: 116 K/UL (ref 130–400)
PLATELET ESTIMATE: ABNORMAL
PMV BLD AUTO: 8.9 FL (ref 6–12)
POTASSIUM SERPL-SCNC: 4.8 MMOL/L (ref 3.7–5.3)
PROTEIN UA: ABNORMAL
RBC # BLD: 2.92 M/UL (ref 4–5.2)
RBC # BLD: ABNORMAL 10*6/UL
RBC UA: ABNORMAL /HPF (ref 0–2)
RENAL EPITHELIAL, UA: ABNORMAL /HPF
SEG NEUTROPHILS: 69 % (ref 36–66)
SEGMENTED NEUTROPHILS ABSOLUTE COUNT: 5.1 K/UL (ref 1.8–7.7)
SODIUM BLD-SCNC: 139 MMOL/L (ref 135–144)
SPECIFIC GRAVITY UA: 1.02 (ref 1–1.03)
TOTAL PROTEIN: 6.7 G/DL (ref 6.4–8.3)
TRICHOMONAS: ABNORMAL
TROPONIN INTERP: ABNORMAL
TROPONIN INTERP: NORMAL
TROPONIN INTERP: NORMAL
TROPONIN T: <0.03 NG/ML
TURBIDITY: ABNORMAL
URINE HGB: ABNORMAL
UROBILINOGEN, URINE: NORMAL
WBC # BLD: 7.4 K/UL (ref 3.5–11)
WBC # BLD: ABNORMAL 10*3/UL
WBC UA: ABNORMAL /HPF (ref 0–5)
YEAST: ABNORMAL

## 2018-08-20 PROCEDURE — 6370000000 HC RX 637 (ALT 250 FOR IP): Performed by: NURSE PRACTITIONER

## 2018-08-20 PROCEDURE — 83605 ASSAY OF LACTIC ACID: CPT

## 2018-08-20 PROCEDURE — 6370000000 HC RX 637 (ALT 250 FOR IP): Performed by: INTERNAL MEDICINE

## 2018-08-20 PROCEDURE — 71045 X-RAY EXAM CHEST 1 VIEW: CPT

## 2018-08-20 PROCEDURE — 2500000003 HC RX 250 WO HCPCS: Performed by: NURSE PRACTITIONER

## 2018-08-20 PROCEDURE — 80048 BASIC METABOLIC PNL TOTAL CA: CPT

## 2018-08-20 PROCEDURE — 2580000003 HC RX 258: Performed by: RADIOLOGY

## 2018-08-20 PROCEDURE — 85025 COMPLETE CBC W/AUTO DIFF WBC: CPT

## 2018-08-20 PROCEDURE — 2580000003 HC RX 258: Performed by: NURSE PRACTITIONER

## 2018-08-20 PROCEDURE — 87086 URINE CULTURE/COLONY COUNT: CPT

## 2018-08-20 PROCEDURE — 2580000003 HC RX 258: Performed by: EMERGENCY MEDICINE

## 2018-08-20 PROCEDURE — 81001 URINALYSIS AUTO W/SCOPE: CPT

## 2018-08-20 PROCEDURE — 80076 HEPATIC FUNCTION PANEL: CPT

## 2018-08-20 PROCEDURE — 36415 COLL VENOUS BLD VENIPUNCTURE: CPT

## 2018-08-20 PROCEDURE — 51702 INSERT TEMP BLADDER CATH: CPT

## 2018-08-20 PROCEDURE — 83735 ASSAY OF MAGNESIUM: CPT

## 2018-08-20 PROCEDURE — 2500000003 HC RX 250 WO HCPCS: Performed by: INTERNAL MEDICINE

## 2018-08-20 PROCEDURE — 6360000002 HC RX W HCPCS: Performed by: EMERGENCY MEDICINE

## 2018-08-20 PROCEDURE — 82947 ASSAY GLUCOSE BLOOD QUANT: CPT

## 2018-08-20 PROCEDURE — 6360000002 HC RX W HCPCS: Performed by: INTERNAL MEDICINE

## 2018-08-20 PROCEDURE — 94761 N-INVAS EAR/PLS OXIMETRY MLT: CPT

## 2018-08-20 PROCEDURE — 87040 BLOOD CULTURE FOR BACTERIA: CPT

## 2018-08-20 PROCEDURE — 99223 1ST HOSP IP/OBS HIGH 75: CPT | Performed by: INTERNAL MEDICINE

## 2018-08-20 PROCEDURE — 96376 TX/PRO/DX INJ SAME DRUG ADON: CPT

## 2018-08-20 PROCEDURE — 36569 INSJ PICC 5 YR+ W/O IMAGING: CPT

## 2018-08-20 PROCEDURE — 84484 ASSAY OF TROPONIN QUANT: CPT

## 2018-08-20 PROCEDURE — 6360000002 HC RX W HCPCS: Performed by: NURSE PRACTITIONER

## 2018-08-20 PROCEDURE — 83874 ASSAY OF MYOGLOBIN: CPT

## 2018-08-20 PROCEDURE — 2500000003 HC RX 250 WO HCPCS: Performed by: EMERGENCY MEDICINE

## 2018-08-20 PROCEDURE — 02HV33Z INSERTION OF INFUSION DEVICE INTO SUPERIOR VENA CAVA, PERCUTANEOUS APPROACH: ICD-10-PCS | Performed by: RADIOLOGY

## 2018-08-20 PROCEDURE — 74176 CT ABD & PELVIS W/O CONTRAST: CPT

## 2018-08-20 PROCEDURE — 99285 EMERGENCY DEPT VISIT HI MDM: CPT

## 2018-08-20 PROCEDURE — 94640 AIRWAY INHALATION TREATMENT: CPT

## 2018-08-20 PROCEDURE — 93005 ELECTROCARDIOGRAM TRACING: CPT

## 2018-08-20 PROCEDURE — 82150 ASSAY OF AMYLASE: CPT

## 2018-08-20 PROCEDURE — 96361 HYDRATE IV INFUSION ADD-ON: CPT

## 2018-08-20 PROCEDURE — 96375 TX/PRO/DX INJ NEW DRUG ADDON: CPT

## 2018-08-20 PROCEDURE — 2000000000 HC ICU R&B

## 2018-08-20 PROCEDURE — 76937 US GUIDE VASCULAR ACCESS: CPT

## 2018-08-20 PROCEDURE — 2700000000 HC OXYGEN THERAPY PER DAY

## 2018-08-20 PROCEDURE — 83690 ASSAY OF LIPASE: CPT

## 2018-08-20 PROCEDURE — 77001 FLUOROGUIDE FOR VEIN DEVICE: CPT

## 2018-08-20 PROCEDURE — C1751 CATH, INF, PER/CENT/MIDLINE: HCPCS

## 2018-08-20 PROCEDURE — C9113 INJ PANTOPRAZOLE SODIUM, VIA: HCPCS | Performed by: EMERGENCY MEDICINE

## 2018-08-20 PROCEDURE — 96374 THER/PROPH/DIAG INJ IV PUSH: CPT

## 2018-08-20 RX ORDER — SENNA PLUS 8.6 MG/1
2 TABLET ORAL DAILY
Status: DISCONTINUED | OUTPATIENT
Start: 2018-08-20 | End: 2018-09-07 | Stop reason: HOSPADM

## 2018-08-20 RX ORDER — DEXTROSE MONOHYDRATE 25 G/50ML
12.5 INJECTION, SOLUTION INTRAVENOUS PRN
Status: DISCONTINUED | OUTPATIENT
Start: 2018-08-20 | End: 2018-09-07 | Stop reason: HOSPADM

## 2018-08-20 RX ORDER — 0.9 % SODIUM CHLORIDE 0.9 %
500 INTRAVENOUS SOLUTION INTRAVENOUS ONCE
Status: COMPLETED | OUTPATIENT
Start: 2018-08-20 | End: 2018-08-20

## 2018-08-20 RX ORDER — PANTOPRAZOLE SODIUM 40 MG/10ML
40 INJECTION, POWDER, LYOPHILIZED, FOR SOLUTION INTRAVENOUS ONCE
Status: COMPLETED | OUTPATIENT
Start: 2018-08-20 | End: 2018-08-20

## 2018-08-20 RX ORDER — ONDANSETRON 2 MG/ML
4 INJECTION INTRAMUSCULAR; INTRAVENOUS EVERY 6 HOURS PRN
Status: DISCONTINUED | OUTPATIENT
Start: 2018-08-20 | End: 2018-08-20

## 2018-08-20 RX ORDER — 0.9 % SODIUM CHLORIDE 0.9 %
10 VIAL (ML) INJECTION ONCE
Status: COMPLETED | OUTPATIENT
Start: 2018-08-20 | End: 2018-08-20

## 2018-08-20 RX ORDER — HYDROCODONE BITARTRATE AND ACETAMINOPHEN 5; 325 MG/1; MG/1
TABLET ORAL
Refills: 0 | Status: ON HOLD | COMMUNITY
Start: 2018-07-16 | End: 2018-09-24

## 2018-08-20 RX ORDER — SODIUM CHLORIDE 0.9 % (FLUSH) 0.9 %
10 SYRINGE (ML) INJECTION EVERY 12 HOURS SCHEDULED
Status: DISCONTINUED | OUTPATIENT
Start: 2018-08-20 | End: 2018-09-07 | Stop reason: HOSPADM

## 2018-08-20 RX ORDER — NICOTINE POLACRILEX 4 MG
15 LOZENGE BUCCAL PRN
Status: DISCONTINUED | OUTPATIENT
Start: 2018-08-20 | End: 2018-09-07 | Stop reason: HOSPADM

## 2018-08-20 RX ORDER — ONDANSETRON 2 MG/ML
4 INJECTION INTRAMUSCULAR; INTRAVENOUS ONCE
Status: COMPLETED | OUTPATIENT
Start: 2018-08-20 | End: 2018-08-20

## 2018-08-20 RX ORDER — ALPRAZOLAM 0.25 MG/1
0.25 TABLET ORAL 3 TIMES DAILY PRN
Status: DISCONTINUED | OUTPATIENT
Start: 2018-08-20 | End: 2018-08-30

## 2018-08-20 RX ORDER — ONDANSETRON 4 MG/1
4 TABLET, ORALLY DISINTEGRATING ORAL EVERY 6 HOURS PRN
Status: DISCONTINUED | OUTPATIENT
Start: 2018-08-20 | End: 2018-09-07 | Stop reason: HOSPADM

## 2018-08-20 RX ORDER — CALCITRIOL 0.25 UG/1
0.25 CAPSULE, LIQUID FILLED ORAL 3 TIMES DAILY
Status: DISCONTINUED | OUTPATIENT
Start: 2018-08-20 | End: 2018-08-23

## 2018-08-20 RX ORDER — 0.9 % SODIUM CHLORIDE 0.9 %
1000 INTRAVENOUS SOLUTION INTRAVENOUS ONCE
Status: COMPLETED | OUTPATIENT
Start: 2018-08-20 | End: 2018-08-20

## 2018-08-20 RX ORDER — CALCIUM CARBONATE 200(500)MG
250 TABLET,CHEWABLE ORAL 3 TIMES DAILY
Status: DISCONTINUED | OUTPATIENT
Start: 2018-08-21 | End: 2018-08-23

## 2018-08-20 RX ORDER — PROMETHAZINE HYDROCHLORIDE 25 MG/ML
12.5 INJECTION, SOLUTION INTRAMUSCULAR; INTRAVENOUS EVERY 6 HOURS PRN
Status: DISCONTINUED | OUTPATIENT
Start: 2018-08-20 | End: 2018-09-07 | Stop reason: HOSPADM

## 2018-08-20 RX ORDER — PROMETHAZINE HYDROCHLORIDE 25 MG/ML
12.5 INJECTION, SOLUTION INTRAMUSCULAR; INTRAVENOUS EVERY 6 HOURS PRN
Status: DISCONTINUED | OUTPATIENT
Start: 2018-08-20 | End: 2018-08-20

## 2018-08-20 RX ORDER — POLYETHYLENE GLYCOL 3350 17 G/17G
17 POWDER, FOR SOLUTION ORAL DAILY PRN
Status: DISCONTINUED | OUTPATIENT
Start: 2018-08-20 | End: 2018-09-06 | Stop reason: SDUPTHER

## 2018-08-20 RX ORDER — ATORVASTATIN CALCIUM 20 MG/1
20 TABLET, FILM COATED ORAL NIGHTLY
Status: DISCONTINUED | OUTPATIENT
Start: 2018-08-20 | End: 2018-09-07 | Stop reason: HOSPADM

## 2018-08-20 RX ORDER — DEXTROSE MONOHYDRATE 25 G/50ML
12.5 INJECTION, SOLUTION INTRAVENOUS ONCE
Status: COMPLETED | OUTPATIENT
Start: 2018-08-20 | End: 2018-08-20

## 2018-08-20 RX ORDER — FUROSEMIDE 40 MG/1
40 TABLET ORAL DAILY
Status: DISCONTINUED | OUTPATIENT
Start: 2018-08-20 | End: 2018-08-20

## 2018-08-20 RX ORDER — LANOLIN ALCOHOL/MO/W.PET/CERES
325 CREAM (GRAM) TOPICAL 2 TIMES DAILY WITH MEALS
Status: DISCONTINUED | OUTPATIENT
Start: 2018-08-20 | End: 2018-09-07 | Stop reason: HOSPADM

## 2018-08-20 RX ORDER — DEXTROSE MONOHYDRATE 50 MG/ML
100 INJECTION, SOLUTION INTRAVENOUS PRN
Status: DISCONTINUED | OUTPATIENT
Start: 2018-08-20 | End: 2018-09-07 | Stop reason: HOSPADM

## 2018-08-20 RX ORDER — ONDANSETRON 2 MG/ML
4 INJECTION INTRAMUSCULAR; INTRAVENOUS EVERY 6 HOURS PRN
Status: DISCONTINUED | OUTPATIENT
Start: 2018-08-20 | End: 2018-09-07 | Stop reason: HOSPADM

## 2018-08-20 RX ORDER — RASAGILINE 1 MG/1
1 TABLET ORAL DAILY
Status: DISCONTINUED | OUTPATIENT
Start: 2018-08-20 | End: 2018-09-07 | Stop reason: HOSPADM

## 2018-08-20 RX ORDER — SODIUM CHLORIDE 0.9 % (FLUSH) 0.9 %
10 SYRINGE (ML) INJECTION PRN
Status: DISCONTINUED | OUTPATIENT
Start: 2018-08-20 | End: 2018-09-07 | Stop reason: HOSPADM

## 2018-08-20 RX ORDER — ALBUTEROL SULFATE 2.5 MG/3ML
2.5 SOLUTION RESPIRATORY (INHALATION)
Status: DISCONTINUED | OUTPATIENT
Start: 2018-08-20 | End: 2018-08-25

## 2018-08-20 RX ORDER — ROPINIROLE 2 MG/1
2 TABLET, FILM COATED ORAL 3 TIMES DAILY
Status: DISCONTINUED | OUTPATIENT
Start: 2018-08-20 | End: 2018-09-07 | Stop reason: HOSPADM

## 2018-08-20 RX ORDER — PROMETHAZINE HYDROCHLORIDE 25 MG/ML
12.5 INJECTION, SOLUTION INTRAMUSCULAR; INTRAVENOUS ONCE
Status: DISCONTINUED | OUTPATIENT
Start: 2018-08-20 | End: 2018-08-20

## 2018-08-20 RX ORDER — ASPIRIN 81 MG/1
81 TABLET ORAL DAILY
Status: DISCONTINUED | OUTPATIENT
Start: 2018-08-20 | End: 2018-09-07 | Stop reason: HOSPADM

## 2018-08-20 RX ORDER — HYDROCODONE BITARTRATE AND ACETAMINOPHEN 5; 325 MG/1; MG/1
1 TABLET ORAL EVERY 4 HOURS PRN
Status: DISCONTINUED | OUTPATIENT
Start: 2018-08-20 | End: 2018-08-30

## 2018-08-20 RX ORDER — AMIODARONE HYDROCHLORIDE 200 MG/1
200 TABLET ORAL DAILY
Status: DISCONTINUED | OUTPATIENT
Start: 2018-08-20 | End: 2018-08-25

## 2018-08-20 RX ORDER — SPIRONOLACTONE 25 MG/1
50 TABLET ORAL DAILY
Status: DISCONTINUED | OUTPATIENT
Start: 2018-08-20 | End: 2018-08-20

## 2018-08-20 RX ORDER — ONDANSETRON HYDROCHLORIDE 8 MG/1
TABLET, FILM COATED ORAL
Refills: 0 | Status: ON HOLD | COMMUNITY
Start: 2018-05-17 | End: 2018-09-24

## 2018-08-20 RX ORDER — CALCIUM CARBONATE 200(500)MG
250 TABLET,CHEWABLE ORAL 3 TIMES DAILY
Status: DISCONTINUED | OUTPATIENT
Start: 2018-08-20 | End: 2018-08-20 | Stop reason: SDUPTHER

## 2018-08-20 RX ORDER — PANTOPRAZOLE SODIUM 40 MG/1
40 TABLET, DELAYED RELEASE ORAL
Status: DISCONTINUED | OUTPATIENT
Start: 2018-08-20 | End: 2018-08-29

## 2018-08-20 RX ORDER — IPRATROPIUM BROMIDE AND ALBUTEROL SULFATE 2.5; .5 MG/3ML; MG/3ML
1 SOLUTION RESPIRATORY (INHALATION) 3 TIMES DAILY
Status: DISCONTINUED | OUTPATIENT
Start: 2018-08-20 | End: 2018-08-24

## 2018-08-20 RX ORDER — SODIUM CHLORIDE 9 MG/ML
INJECTION, SOLUTION INTRAVENOUS CONTINUOUS
Status: DISCONTINUED | OUTPATIENT
Start: 2018-08-20 | End: 2018-08-23

## 2018-08-20 RX ORDER — FENTANYL CITRATE 50 UG/ML
50 INJECTION, SOLUTION INTRAMUSCULAR; INTRAVENOUS ONCE
Status: COMPLETED | OUTPATIENT
Start: 2018-08-20 | End: 2018-08-20

## 2018-08-20 RX ORDER — CARBIDOPA AND LEVODOPA 25; 100 MG/1; MG/1
1 TABLET, EXTENDED RELEASE ORAL 4 TIMES DAILY
Status: DISCONTINUED | OUTPATIENT
Start: 2018-08-20 | End: 2018-09-07 | Stop reason: HOSPADM

## 2018-08-20 RX ORDER — SODIUM CHLORIDE 9 MG/ML
INJECTION, SOLUTION INTRAVENOUS CONTINUOUS
Status: DISCONTINUED | OUTPATIENT
Start: 2018-08-20 | End: 2018-08-20

## 2018-08-20 RX ADMIN — Medication 2 PUFF: at 19:29

## 2018-08-20 RX ADMIN — PANTOPRAZOLE SODIUM 40 MG: 40 TABLET, DELAYED RELEASE ORAL at 11:08

## 2018-08-20 RX ADMIN — ANTACID TABLETS 250 MG: 500 TABLET, CHEWABLE ORAL at 10:53

## 2018-08-20 RX ADMIN — SODIUM CHLORIDE: 9 INJECTION, SOLUTION INTRAVENOUS at 21:15

## 2018-08-20 RX ADMIN — CARBIDOPA AND LEVODOPA 1 TABLET: 25; 100 TABLET, EXTENDED RELEASE ORAL at 10:51

## 2018-08-20 RX ADMIN — FUROSEMIDE 40 MG: 40 TABLET ORAL at 10:54

## 2018-08-20 RX ADMIN — ONDANSETRON 4 MG: 2 INJECTION INTRAMUSCULAR; INTRAVENOUS at 02:07

## 2018-08-20 RX ADMIN — MAGNESIUM OXIDE TAB 400 MG (241.3 MG ELEMENTAL MG) 400 MG: 400 (241.3 MG) TAB at 10:55

## 2018-08-20 RX ADMIN — SODIUM CHLORIDE: 9 INJECTION, SOLUTION INTRAVENOUS at 03:18

## 2018-08-20 RX ADMIN — ANTACID TABLETS 250 MG: 500 TABLET, CHEWABLE ORAL at 14:26

## 2018-08-20 RX ADMIN — FERROUS SULFATE TAB EC 325 MG (65 MG FE EQUIVALENT) 325 MG: 325 (65 FE) TABLET DELAYED RESPONSE at 10:54

## 2018-08-20 RX ADMIN — ANTACID TABLETS 250 MG: 500 TABLET, CHEWABLE ORAL at 20:44

## 2018-08-20 RX ADMIN — ASPIRIN 81 MG: 81 TABLET, COATED ORAL at 10:54

## 2018-08-20 RX ADMIN — FENTANYL CITRATE 50 MCG: 50 INJECTION, SOLUTION INTRAMUSCULAR; INTRAVENOUS at 03:33

## 2018-08-20 RX ADMIN — CALCITRIOL 0.25 MCG: 0.25 CAPSULE ORAL at 14:26

## 2018-08-20 RX ADMIN — NOREPINEPHRINE BITARTRATE 2 MCG/MIN: 1 INJECTION INTRAVENOUS at 07:30

## 2018-08-20 RX ADMIN — AZTREONAM 500 MG: 1 INJECTION, POWDER, LYOPHILIZED, FOR SOLUTION INTRAMUSCULAR; INTRAVENOUS at 21:25

## 2018-08-20 RX ADMIN — DEXTROSE MONOHYDRATE 12.5 G: 25 INJECTION, SOLUTION INTRAVENOUS at 03:15

## 2018-08-20 RX ADMIN — ROPINIROLE HYDROCHLORIDE 2 MG: 2 TABLET, FILM COATED ORAL at 20:43

## 2018-08-20 RX ADMIN — CARBIDOPA AND LEVODOPA 1 TABLET: 25; 100 TABLET, EXTENDED RELEASE ORAL at 17:26

## 2018-08-20 RX ADMIN — LINAGLIPTIN 5 MG: 5 TABLET, FILM COATED ORAL at 10:54

## 2018-08-20 RX ADMIN — ENOXAPARIN SODIUM 30 MG: 100 INJECTION SUBCUTANEOUS at 10:53

## 2018-08-20 RX ADMIN — RASAGILINE 1 MG: 1 TABLET ORAL at 14:28

## 2018-08-20 RX ADMIN — IPRATROPIUM BROMIDE AND ALBUTEROL SULFATE 1 AMPULE: .5; 3 SOLUTION RESPIRATORY (INHALATION) at 15:35

## 2018-08-20 RX ADMIN — Medication 10 ML: at 02:56

## 2018-08-20 RX ADMIN — ROPINIROLE HYDROCHLORIDE 2 MG: 2 TABLET, FILM COATED ORAL at 10:52

## 2018-08-20 RX ADMIN — MICONAZOLE NITRATE: 20.6 POWDER TOPICAL at 14:27

## 2018-08-20 RX ADMIN — SODIUM CHLORIDE 500 ML: 9 INJECTION, SOLUTION INTRAVENOUS at 04:25

## 2018-08-20 RX ADMIN — CHOLECALCIFEROL CAP 125 MCG (5000 UNIT) 5000 UNITS: 125 CAP at 10:51

## 2018-08-20 RX ADMIN — TOBRAMYCIN SULFATE 170 MG: 40 INJECTION, SOLUTION INTRAMUSCULAR; INTRAVENOUS at 05:26

## 2018-08-20 RX ADMIN — ATORVASTATIN CALCIUM 20 MG: 20 TABLET, FILM COATED ORAL at 20:43

## 2018-08-20 RX ADMIN — ONDANSETRON HYDROCHLORIDE 4 MG: 2 INJECTION, SOLUTION INTRAMUSCULAR; INTRAVENOUS at 08:15

## 2018-08-20 RX ADMIN — IPRATROPIUM BROMIDE AND ALBUTEROL SULFATE 1 AMPULE: .5; 3 SOLUTION RESPIRATORY (INHALATION) at 19:29

## 2018-08-20 RX ADMIN — Medication 10 ML: at 20:19

## 2018-08-20 RX ADMIN — ONDANSETRON HYDROCHLORIDE 4 MG: 2 INJECTION, SOLUTION INTRAMUSCULAR; INTRAVENOUS at 17:26

## 2018-08-20 RX ADMIN — CARBIDOPA AND LEVODOPA 1 TABLET: 25; 100 TABLET, EXTENDED RELEASE ORAL at 20:43

## 2018-08-20 RX ADMIN — MICONAZOLE NITRATE: 20.6 POWDER TOPICAL at 20:19

## 2018-08-20 RX ADMIN — CALCITRIOL 0.25 MCG: 0.25 CAPSULE ORAL at 20:43

## 2018-08-20 RX ADMIN — PROMETHAZINE HYDROCHLORIDE 12.5 MG: 25 INJECTION INTRAMUSCULAR; INTRAVENOUS at 20:41

## 2018-08-20 RX ADMIN — AMIODARONE HYDROCHLORIDE 200 MG: 200 TABLET ORAL at 10:54

## 2018-08-20 RX ADMIN — SODIUM CHLORIDE 500 ML: 9 INJECTION, SOLUTION INTRAVENOUS at 03:50

## 2018-08-20 RX ADMIN — ROPINIROLE HYDROCHLORIDE 2 MG: 2 TABLET, FILM COATED ORAL at 14:30

## 2018-08-20 RX ADMIN — SODIUM CHLORIDE 1000 ML: 9 INJECTION, SOLUTION INTRAVENOUS at 02:07

## 2018-08-20 RX ADMIN — CALCITRIOL 0.25 MCG: 0.25 CAPSULE ORAL at 10:53

## 2018-08-20 RX ADMIN — DEXTROSE MONOHYDRATE 12.5 G: 25 INJECTION, SOLUTION INTRAVENOUS at 05:54

## 2018-08-20 RX ADMIN — HYDROCODONE BITARTRATE AND ACETAMINOPHEN 1 TABLET: 5; 325 TABLET ORAL at 11:08

## 2018-08-20 RX ADMIN — AZTREONAM 1 G: 1 INJECTION, POWDER, LYOPHILIZED, FOR SOLUTION INTRAMUSCULAR; INTRAVENOUS at 04:56

## 2018-08-20 RX ADMIN — AZTREONAM 500 MG: 1 INJECTION, POWDER, LYOPHILIZED, FOR SOLUTION INTRAMUSCULAR; INTRAVENOUS at 14:27

## 2018-08-20 RX ADMIN — SPIRONOLACTONE 50 MG: 25 TABLET ORAL at 10:54

## 2018-08-20 RX ADMIN — PANTOPRAZOLE SODIUM 40 MG: 40 TABLET, DELAYED RELEASE ORAL at 17:21

## 2018-08-20 RX ADMIN — PANTOPRAZOLE SODIUM 40 MG: 40 INJECTION, POWDER, FOR SOLUTION INTRAVENOUS at 02:56

## 2018-08-20 RX ADMIN — CARBIDOPA AND LEVODOPA 1 TABLET: 25; 100 TABLET, EXTENDED RELEASE ORAL at 14:30

## 2018-08-20 RX ADMIN — ONDANSETRON 4 MG: 2 INJECTION INTRAMUSCULAR; INTRAVENOUS at 02:56

## 2018-08-20 RX ADMIN — Medication 10 ML: at 20:20

## 2018-08-20 ASSESSMENT — ENCOUNTER SYMPTOMS
RHINORRHEA: 0
COUGH: 0
WHEEZING: 0
VOMITING: 1
SINUS PRESSURE: 0
COLOR CHANGE: 0
NAUSEA: 1
DIARRHEA: 1
CONSTIPATION: 0
SORE THROAT: 0
SHORTNESS OF BREATH: 0
ABDOMINAL PAIN: 0

## 2018-08-20 ASSESSMENT — PAIN SCALES - GENERAL
PAINLEVEL_OUTOF10: 0
PAINLEVEL_OUTOF10: 10
PAINLEVEL_OUTOF10: 0
PAINLEVEL_OUTOF10: 10
PAINLEVEL_OUTOF10: 7
PAINLEVEL_OUTOF10: 10
PAINLEVEL_OUTOF10: 0

## 2018-08-20 NOTE — CONSULTS
Clinical Pharmacy Note  Aminoglycoside Consult - Initial Note    Katie Brody is a 72 y.o. female ordered Tobramycin  for UTI ; consult received from FIDEL Zarate CNP to manage therapy.      Additional antibiotics:azactam    Patient Active Problem List   Diagnosis    Controlled type 2 diabetes mellitus with stage 3 chronic kidney disease, without long-term current use of insulin (HCC)    Hyperlipidemia    Essential hypertension    Chronic leg pain    Chronic atrial fibrillation (HCC)    Asthma    Gastroesophageal reflux disease without esophagitis    Sleep apnea    ECTOR on CPAP    Type 2 diabetes mellitus without complication, without long-term current use of insulin (HCC)    Spinal stenosis in cervical region    Hypomagnesemia    Hyperphosphaturia    Postoperative anemia due to acute blood loss    Hypocalcemia    Aspiration pneumonitis (HCC)    Parkinson's disease (HCC)    Syncope and collapse    Gastrointestinal hemorrhage    Acute renal failure (HCC)    Acute cystitis without hematuria    Mental status change    Iron deficiency anemia due to chronic blood loss    Morbid obesity with BMI of 40.0-44.9, adult ([facility]    HCAP (healthcare-associated pneumonia)    Acute respiratory failure with hypercapnia (HCC)    Acute on chronic diastolic congestive heart failure (HCC)    Hyperplastic polyp of intestine    Diverticulosis    Panlobular emphysema (HCC)    Rapid atrial fibrillation (HCC)    Atrial fibrillation with RVR (HCC)    CKD (chronic kidney disease) stage 3, GFR 30-59 ml/min    KRISTIN (acute kidney injury) ([facility]    Anemia in chronic kidney disease    Acute on chronic respiratory failure with hypoxia and hypercapnia (HCC)    Acute kidney injury superimposed on chronic kidney disease (HCC)    CKD stage 4 due to type 2 diabetes mellitus (HCC)    Urinary tract infection without hematuria    Urethral caruncle    Nephrolithiasis    Candidal intertrigo    Venous insufficiency   

## 2018-08-20 NOTE — ED PROVIDER NOTES
blood pressures. With continued hypotension however central line placement will be necessary for pressor use. Patient CT abdomen pelvis does demonstrate chronic gallbladder issues although no evidence of acute cholecystitis. Indwelling  kidney stones although no evidence of obstructive uropathy. No other acute pathology noted. Chest x-ray also read negative for acute pathology. Urinalysis does demonstrate hematuria. 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  She has shown sensitivity to tobramycin. Given her significant lactic acidemia and abdominal discomfort, initial antibiotic coverage will utilize tobramycin as dosed by pharmacy in conjunction with azactam pending reevaluation    Right IJ central line attempted. Patient had limitation in movement of her neck and is unable to look to the left significantly limiting my current field of view patient also had significant movement which compromised attempt as well. Ultimately we were unable to place this line without inflicting undue risk to  the patient. We did discuss possible femoral line placement however patient does have pannus as well as sub-pannus infection. Thus I cannot utilize femoral line. Patient's blood pressures have improved with fluids she now has systolic of 046. She is alert and interactive. Additional peripheral line is placed. She does not yet require formal pressors. She will be placed on antibiotic coverage she continues on IV fluids . interventional radiology will be consulted for central line placement     Care is discussed with internal medicine to facilitate ICU admission. Urinalysis was abnormal.  Urine culture is pending. Patient has also had recent diagnosis of cellulitis of the extremities.       DIAGNOSTIC RESULTS     EKG: All EKG's are interpreted by the Emergency Department Physician who either signs or Co-signs this chart in the absence of a cardiologist.    EKG the abdomen and pelvis was performed without the administration of   intravenous contrast. Multiplanar reformatted images are provided for review. Dose modulation, iterative reconstruction, and/or weight based adjustment of   the mA/kV was utilized to reduce the radiation dose to as low as reasonably   achievable.       COMPARISON:   03/08/2018       HISTORY:   ORDERING SYSTEM PROVIDED HISTORY: nausea, lactic >10       FINDINGS:   Lower Chest: The heart is mildly enlarged.  There are cardiac calcifications   including calcific coronary artery disease.  There are few bands of bibasilar   atelectasis.       Organs: The liver, spleen, pancreas and adrenal glands are grossly normal   with the limitations of a noncontrast study.  There are bilateral   nonobstructing renal calculi measuring up to 1.2 cm in length in the right   kidney.  No acute obstructive uropathy. [place] Scar[place] is high attenuation layering   in the dependent portion of the gallbladder.  This may represent thick   bile/sludge.  Small gallstones not excluded.       GI/Bowel: There is a normal retrocecal appendix. [place] Scar[place] is some high   attenuation material within the stomach and duodenum.  This may represent   ingested medication.  Unopacified bowel loops are otherwise unremarkable.  No   evidence of bowel obstruction.       Pelvis: Urinary bladder is unremarkable.  There are unchanged calcified   uterine fibroids.       Peritoneum/Retroperitoneum: Calcific aorto iliac atherosclerotic disease with   no evidence of an aneurysm.  No adenopathy, free air or free fluid.       Bones/Soft Tissues:  There are multilevel degenerative changes in the lower   thoracic and lumbar spine.  There is heterogeneous bone density in the lower   thoracic and lumbar spine and pelvis.  No acute bone finding.           Impression   No definite acute finding in the abdomen or pelvis.       There are bilateral nonobstructing renal calculi measuring up to 1.2 cm in   length in the right

## 2018-08-20 NOTE — PROGRESS NOTES
Best:   PEF   % Predicted   Peak Flow : not applicable = 0    UAA2/NMB    FEV1 Predicted       FEV1     FEV1 % Predicted   FVC   IS volume   IBW   FIO2% 2L  SPO2 96  RR 18  Breath Sounds: diminished upper airway wheeze      · Bronchodilator assessment at level  2 home med  · Hyperinflation assessment at level   · Secretion Management assessment at level    ·   · []    Bronchodilator Assessment  BRONCHODILATOR ASSESSMENT SCORE  Score 0 1 2 3 4 5   Breath Sounds   []  Patient Baseline []  No Wheeze good aeration [x]  Faint, scattered wheezing, good aeration []  Expiratory Wheezing and or moderately diminished []  Insp/Exp wheeze and/or very diminished []  Insp/Exp and/ or marked distress   Respiratory Rate   []  Patient Baseline [x]  Less than 20 []  Less than 20 []  20-25 []  Greater than 25 []  Greater than 25   Peak flow % of Pred or PB [x]  NA   []  Greater than 90%  []  81-90% []  71-80% []  Less than or equal to 70%  or unable to perform []  Unable due to Respiratory Distress   Dyspnea re []  Patient Baseline []  No SOB []  No SOB [x]  SOB on exertion []  SOB min activity []  At rest/acute   e FEV% Predicted       []  NA []  Above 69%  [x]  Unable []  Above 60-69%  [x]  Unable []  Above 50-59%  [x]  Unable []  Above 35-49%  []  Unable []  Less than 35%  []  Unable                 []  Hyperinflation Assessment  Score 1 2 3   CXR and Breath Sounds   []  Clear []  No atelectasis  Basilar aeration []  Atelectasis or absent basilar breath sounds   Incentive Spirometry Volume  (Per IBW)   []  Greater than or equal to 15ml/Kg []  less than 15ml/Kg []  less than 15ml/Kg   Surgery within last 2 weeks []  None or general   []  Abdominal or thoracic surgery  []  Abdominal or thoracic   Chronic Pulmonary Historyre []  No []  Yes []  Yes     []  Secretion Management Assessment  Score 1 2 3   Bilateral Breath Sounds   []  Occasional Rhonchi []  Scattered Rhonchi []  Course Rhonchi and/or poor aeration   Sputum    []  Small amount of thin secretions []  Moderate amount of viscous secretions []  Copius, Viscious Yellow/ Secretions   CXR as reported by physician []  clear  []  Unavailable []  Infiltrates and/or consolidation  []  Unavailable []  Mucus Plugging and or lobar consolidation  []  Unavailable   Cough []  Strong, productive cough []  Weak productive cough []  No cough or weak non-productive cough

## 2018-08-20 NOTE — BRIEF OP NOTE
Brief Postoperative Note    Rolly Geronimo  YOB: 1945  [id number]    Pre-operative Diagnosis: Hypotension     Post-operative Diagnosis: Same    Procedure: U/S and fluoroscopic guided PICC placement     Anesthesia: Local    Surgeons/Assistants: Tessa Walter MD; Sammie Noel RN    Estimated Blood Loss: less than 50     Complications: None    Specimens: Was Not Obtained    Findings: 5 Fr dual lumen PICC placed through the right basilic vein, trimmed to 36 cm     Electronically signed by Tessa Walter MD on 8/20/2018 at 9:09 AM

## 2018-08-20 NOTE — ED NOTES
Dr. Elma Colon informed of blood pressure, patient on monitor with nursing to CT.       Marylee Courier, RN  08/20/18 0118

## 2018-08-20 NOTE — ED PROVIDER NOTES
tablet Take 1 tablet by mouth 3 times daily as needed for Anxiety for up to 90 days. [place]: 90 tablet, Refills: 0    Associated Diagnoses: Anxiety      butalbital-acetaminophen-caffeine (FIORICET, ESGIC) -40 MG per tablet Take 1 tablet by mouth every 4 hours as needed for Headaches  Qty: 15 tablet, Refills: 0      nystatin (MYCOSTATIN) 822344 UNIT/GM cream Apply topically 2 times daily.       furosemide (LASIX) 20 MG tablet Take 1 tablet by mouth daily  Qty: 60 tablet, Refills: 3      carbidopa-levodopa (SINEMET CR)  MG per extended release tablet Take 1 tablet by mouth 4 times daily      calcium citrate (CALCITRATE) 250 MG TABS tablet Take 250 mg by mouth 3 times daily      magnesium oxide (MAG-OX) 400 MG tablet Take 400 mg by mouth daily      polyethylene glycol (GLYCOLAX) packet Take 17 g by mouth daily as needed for Constipation      senna (SENOKOT) 8.6 MG tablet Take 2 tablets by mouth daily      conjugated estrogens (PREMARIN) 0.625 MG/GM vaginal cream Place 0.5 g vaginally every other day  Qty: 1 Tube, Refills: 3      metoprolol tartrate (LOPRESSOR) 25 MG tablet Take 0.5 tablets by mouth 2 times daily  Qty: 60 tablet, Refills: 3      aspirin 81 MG tablet Take 81 mg by mouth daily LAST DOSE 03/31/2018      rOPINIRole (REQUIP) 2 MG tablet Take 2 mg by mouth 3 times daily      pantoprazole (PROTONIX) 40 MG tablet Take 1 tablet by mouth 2 times daily (before meals)  Qty: 180 tablet, Refills: 3    Associated Diagnoses: Gastroesophageal reflux disease without esophagitis      BREO ELLIPTA 100-25 MCG/INH AEPB inhaler Inhale 1 puff into the lungs daily  Qty: 3 each, Refills: 3    Associated Diagnoses: COPD with exacerbation (HCC)      ferrous sulfate 325 (65 Fe) MG EC tablet Take 1 tablet by mouth 2 times daily (with meals)  Qty: 90 tablet, Refills: 3      vitamin D (CHOLECALCIFEROL) 5000 units CAPS capsule Take 5,000 Units by mouth daily      rasagiline mesylate 1 MG TABS Take 1 tablet by mouth daily calcitRIOL (ROCALTROL) 0.25 MCG capsule TAKE 1 CAPSULE THREE TIMES A DAY  Qty: 270 capsule, Refills: 2    Associated Diagnoses: Hyperparathyroidism (HCC)      amiodarone (CORDARONE) 200 MG tablet Take 1 tablet by mouth daily  Qty: 30 tablet, Refills: 3      albuterol (PROVENTIL) (2.5 MG/3ML) 0.083% nebulizer solution Take 3 mLs by nebulization every 6 hours as needed for Wheezing  Qty: 360 each, Refills: 3    Associated Diagnoses: COPD with exacerbation ([facility]      Oxygen Concentrator Dx: Pneumonia, use as directed. Qty: 1 each, Refills: 0      ondansetron (ZOFRAN) 8 MG tablet TK 1 T PO Q 8 H PRF NAUSEA OR VOM  Refills: 0      HYDROcodone-acetaminophen (NORCO) 5-325 MG per tablet TK 1 T PO BID PRF PAIN  Refills: 0             PAST MEDICAL HISTORY         Diagnosis Date    Acute on chronic diastolic congestive heart failure (HCC)     Anesthesia complication     DIFFICULTY WAKING OP    Asthma     Atrial fibrillation ([facility] 2013    Cataracts, bilateral     Cellulitis     BILAT LOWER LEGS-PT STATES IS IMPROVING    Cerebral artery occlusion with cerebral infarction [facility])     Last stroke was February 2017 w/no deficits-TOTAL OF 3    Chronic kidney disease 2013    NOT ON DIALYSIS YET    Constipation     CHRONIC    COPD (chronic obstructive pulmonary disease) ([facility] 2008    PT. SEES DR. BECK    Difficult intravenous access     VEINS ROLL    Diverticulosis     DM2 (diabetes mellitus, type 2) ([facility] 2008    Full dentures     FULL UPPER ONLY    GERD (gastroesophageal reflux disease) 2008    ON RX    Headache(784.0)     [place] (hard of hearing)     HAS HEARING AIDS BUT DOES NOT WEAR    Hyperlipidemia     Hyperplastic polyp of intestine     Hypertension 2008    Impaired ambulation     USES TRANFER CHAIR WALKER OR CANE    Kidney stone 2018    PRESENTLY-SMALL    Morbid obesity with BMI of 40.0-44.9, adult ([facility] 12/30/2016    ECTOR on CPAP 2008    USES C-PAP NIGHTLY    Osteoarthritis     Parkinson disease reports that she does not use drugs. REVIEW OF SYSTEMS    (2-9 systems for level 4, 10 or more for level 5)     Review of Systems   Constitutional: Positive for chills and fatigue. Negative for fever and unexpected weight change. HENT: Negative for congestion, rhinorrhea, sinus pressure and sore throat. Respiratory: Negative for cough, shortness of breath and wheezing. Cardiovascular: Negative for chest pain and palpitations. Gastrointestinal: Positive for diarrhea, nausea and vomiting. Negative for abdominal pain and constipation. Genitourinary: Negative for dysuria and hematuria. Musculoskeletal: Negative for arthralgias and myalgias. Skin: Negative for color change and rash. Neurological: Positive for weakness. Negative for dizziness and headaches. Hematological: Negative for adenopathy. Except as noted above the remainder of the review of systems was reviewed and negative. PHYSICAL EXAM    (up to 7 for level 4, 8 or more for level 5)     ED Triage Vitals [08/20/18 0110]   BP Temp Temp Source Pulse Resp SpO2 Height Weight   (!) 122/23 97.5 °F (36.4 °C) Oral 172 22 97 % 5' 1\" (1.549 m) 185 lb (83.9 kg)       Physical Exam   Constitutional: She is oriented to person, place, and time. She appears well-developed and well-nourished. She appears toxic. She has a sickly appearance. She appears ill. HENT:   Head: Normocephalic and atraumatic. Mouth/Throat: Oropharynx is clear and moist. Mucous membranes are dry. Eyes: Pupils are equal, round, and reactive to light. Conjunctivae are normal.   Neck: Normal range of motion. Neck supple. Cardiovascular: Normal rate and regular rhythm. Pulmonary/Chest: Effort normal and breath sounds normal. No stridor. No respiratory distress. Abdominal: Soft. Bowel sounds are normal. She exhibits distension. Musculoskeletal: Normal range of motion. Lymphadenopathy:     She has no cervical adenopathy.    Neurological: She is alert and oriented to person, place, and time. Skin: Skin is warm and dry. No rash noted. Psychiatric: She has a normal mood and affect. Vitals reviewed. RADIOLOGY:   Non-plain film images such as CT, Ultrasound and MRI are read by the radiologist. Simi Kumari radiographic images are visualized and preliminarily interpreted by the emergency physician with the below findings:    Ct Abdomen Pelvis Wo Contrast    Result Date: 8/20/2018  EXAMINATION: CT OF THE ABDOMEN AND PELVIS WITHOUT CONTRAST 8/20/2018 3:15 am TECHNIQUE: CT of the abdomen and pelvis was performed without the administration of intravenous contrast. Multiplanar reformatted images are provided for review. Dose modulation, iterative reconstruction, and/or weight based adjustment of the mA/kV was utilized to reduce the radiation dose to as low as reasonably achievable. COMPARISON: 03/08/2018 HISTORY: ORDERING SYSTEM PROVIDED HISTORY: nausea, lactic >10 FINDINGS: Lower Chest: The heart is mildly enlarged. There are cardiac calcifications including calcific coronary artery disease. There are few bands of bibasilar atelectasis. Organs: The liver, spleen, pancreas and adrenal glands are grossly normal with the limitations of a noncontrast study. There are bilateral nonobstructing renal calculi measuring up to 1.2 cm in length in the right kidney. No acute obstructive uropathy. There is high attenuation layering in the dependent portion of the gallbladder. This may represent thick bile/sludge. Small gallstones not excluded. GI/Bowel: There is a normal retrocecal appendix. There is some high attenuation material within the stomach and duodenum. This may represent ingested medication. Unopacified bowel loops are otherwise unremarkable. No evidence of bowel obstruction. Pelvis: Urinary bladder is unremarkable. There are unchanged calcified uterine fibroids.  Peritoneum/Retroperitoneum: Calcific aorto iliac atherosclerotic disease with no evidence of an aneurysm. No adenopathy, free air or free fluid. Bones/Soft Tissues: There are multilevel degenerative changes in the lower thoracic and lumbar spine. There is heterogeneous bone density in the lower thoracic and lumbar spine and pelvis. No acute bone finding. No definite acute finding in the abdomen or pelvis. There are bilateral nonobstructing renal calculi measuring up to 1.2 cm in length in the right kidney. High attenuation layering in the dependent portion of the gallbladder, likely thick bile/sludge. Small gallstones cannot be excluded. Follow-up gallbladder ultrasound would be helpful. Mild fibroid uterus. Xr Chest Portable    Result Date: 8/20/2018  EXAMINATION: SINGLE XRAY VIEW OF THE CHEST 8/20/2018 2:54 am COMPARISON: June 8, 2018 HISTORY: ORDERING SYSTEM PROVIDED HISTORY: nausea, elev lactic >10 TECHNOLOGIST PROVIDED HISTORY: Reason for exam:->nausea, elev lactic >10 FINDINGS: Patient's chin overlies the lung apices. Within the limits of the exam, no pneumothorax identified. Enlarged cardiac silhouette and prominent mediastinal contours are stable. No lobar lung consolidation or significant pleural effusion. Stable osseous structures. Patient's chin obscures the lung apices. Stable cardiomegaly. No acute cardiopulmonary process identified. Ir Picc Wo Sq Port/pump > 5 Years    Result Date: 8/20/2018  PROCEDURE: ULTRASOUND GUIDED VASCULAR ACCESS. FLUOROSCOPY GUIDED PICC PLACEMENT 8/20/2018. HISTORY: ORDERING SYSTEM PROVIDED HISTORY: pressors TECHNOLOGIST PROVIDED HISTORY: Reason for exam:->pressors Hypotension SEDATION: Local FLUOROSCOPY DOSE AND TYPE OR TIME AND EXPOSURES: 0.4 minutes; dap 2240 centigray cm2. TECHNIQUE: Informed consent was obtained after a detailed explanation of the procedure including risks, benefits, and alternatives. Universal protocol was observed. The right arm was prepped and draped in sterile fashion using maximum sterile barrier technique.  Local anesthesia was achieved with lidocaine. A micropuncture needle was used to access the right basilic vein using ultrasound guidance. An ultrasound image demonstrating patency of the vein with needle tip located within it. An image was obtained and stored in PACs. A 0.018 guidewire was used to place a peel-a-way sheath and a 5 [place] power injectable dual lumen PICC was trimmed to 36 cm and advanced with fluoroscopic guidance with the tip at the cavo-atrial junction. The catheter flushed easily and there was a good blood return. The catheter was secured to the skin. The patient tolerated the procedure well and there were no immediate complications. The procedure was performed by Madeleine Rider RN under the supervision of Jose Bridges M.D. FINDINGS: Fluoroscopic image demonstrates the tip of the catheter at the cavo-atrial junction. Successful ultrasound and fluoroscopy guided PICC placement     Interpretation per the Radiologist below, if available at the time of this note:    IR PICC WO SQ PORT/PUMP > 5 YEARS   Final Result   Successful ultrasound and fluoroscopy guided PICC placement         CT ABDOMEN PELVIS WO CONTRAST   Final Result   No definite acute finding in the abdomen or pelvis. There are bilateral nonobstructing renal calculi measuring up to 1.2 cm in   length in the right kidney. High attenuation layering in the dependent portion of the gallbladder, likely   thick bile/sludge. Small gallstones cannot be excluded. Follow-up   gallbladder ultrasound would be helpful. Mild fibroid uterus. XR CHEST PORTABLE   Final Result   Patient's chin obscures the lung apices. Stable cardiomegaly. No acute   cardiopulmonary process identified.                  LABS:  Labs Reviewed   CBC WITH AUTO DIFFERENTIAL - Abnormal; Notable for the following:        Result Value    RBC 2.92 (*)     Hemoglobin 9.8 (*)     Hematocrit 30.4 (*)     .2 (*)     RDW 14.9 (*)     Platelets 055 (*) Seg Neutrophils 69 (*)     Lymphocytes 23 (*)     Monocytes 8 (*)     Eosinophils % 0 (*)     All other components within normal limits   BASIC METABOLIC PANEL - Abnormal; Notable for the following:     Glucose 50 (*)     BUN 42 (*)     CREATININE 4.09 (*)     Chloride 93 (*)     CO2 19 (*)     Anion Gap 27 (*)     GFR Non- 11 (*)     GFR  13 (*)     All other components within normal limits   HEPATIC FUNCTION PANEL - Abnormal; Notable for the following:      Total Bilirubin 0.11 (*)     All other components within normal limits   TROP/MYOGLOBIN - Abnormal; Notable for the following:     Myoglobin 167 (*)     All other components within normal limits   LACTIC ACID - Abnormal; Notable for the following:     Lactic Acid 10.0 (*)     All other components within normal limits   URINE RT REFLEX TO CULTURE - Abnormal; Notable for the following:     Turbidity UA SLIGHTLY CLOUDY (*)     Urine Hgb 3+ (*)     Protein, UA 1+ (*)     Leukocyte Esterase, Urine SMALL (*)     All other components within normal limits   LACTIC ACID - Abnormal; Notable for the following:     Lactic Acid 10.8 (*)     All other components within normal limits   MICROSCOPIC URINALYSIS - Abnormal; Notable for the following:     Bacteria, UA FEW (*)     Amorphous, UA 1+ (*)     All other components within normal limits   LACTATE, SEPSIS - Abnormal; Notable for the following:     Lactic Acid, Sepsis 11.8 (*)     All other components within normal limits   POC GLUCOSE FINGERSTICK - Abnormal; Notable for the following:     POC Glucose 57 (*)     All other components within normal limits   POC GLUCOSE FINGERSTICK - Abnormal; Notable for the following:     POC Glucose 113 (*)     All other components within normal limits   CULTURE BLOOD #1   CULTURE BLOOD #1   URINE CULTURE   GRAM STAIN   RESPIRATORY CULTURE   LIPASE   AMYLASE   MAGNESIUM   TROPONIN   TROPONIN   POC GLUCOSE FINGERSTICK   POCT GLUCOSE   POCT GLUCOSE   POCT

## 2018-08-20 NOTE — PLAN OF CARE
[facility]    Second Visit Note  For more detailed information please refer to the progress note of the day      8/20/2018    2:40 PM    Name:   Argenis Hawkins  MRN:     [id number]     Acct:      [de-identified]   Room:   [facility]9501-36   Day:  0  Admit Date:  8/20/2018  1:21 AM    PCP:   Dominga Adams DO  Code Status:  Full Code        Pt vitals were reviewed   New labs were reviewed   Patient was seen    Updated plan :     1. Patient's  updated at the bedside  2. Continue IV hydration and Levophed for blood pressure support  3.  Antibiotics pending culture        Stephanie Nix DO  8/20/2018  2:40 PM

## 2018-08-20 NOTE — H&P
Full dentures     FULL UPPER ONLY    GERD (gastroesophageal reflux disease) 2008    ON RX    Headache(784.0)     [place] (hard of hearing)     HAS HEARING AIDS BUT DOES NOT WEAR    Hyperlipidemia     Hyperplastic polyp of intestine     Hypertension 2008    Impaired ambulation     USES TRANFER CHAIR WALKER OR CANE    Kidney stone 2018    PRESENTLY-SMALL    Morbid obesity with BMI of 40.0-44.9, adult ([facility] 12/30/2016    ECTOR on CPAP 2008    USES C-PAP NIGHTLY    Osteoarthritis     Parkinson disease ([facility] 2015    Restless leg syndrome 2013    MILD    Spinal stenosis in cervical region 6/2/2013    Type II or unspecified type diabetes mellitus without mention of complication, not stated as uncontrolled     Unspecified sleep apnea     cpap nightly    Urethral caruncle 3/8/2018    Wears glasses         Past Surgical History:     Past Surgical History:   Procedure Laterality Date    APPENDECTOMY      BRONCHOSCOPY  06/05/2018   [id number] SURGERY  2012    CERVICAL SPINE SURGERY  5/31/13    posterior [id number]    COLONOSCOPY  2009    COLONOSCOPY  12/29/2016    incomplete was not cleaned out    COLONOSCOPY  12/30/2016    COLONOSCOPY  04/25/2017     SIGMOID COLON POLYPECTOMY:  HYPERPLASTIC POLYP ,   DIVERTICULOSIS    CYSTORRHAPHY  04/04/2018    EYE SURGERY Bilateral 2017    CATARACTS W/ IOL    HERNIA REPAIR  1999    VENTRAL    LAMINECTOMY  05/31/2013    Dr. Brianna Rock DX W/CELL WASHG [facility] N/A 6/5/2018    BRONCHOSCOPY performed by Royce Gomez MD at [address] N/A 4/25/2017    COLONOSCOPY POLYPECTOMY HOT BIOPSY performed by Lashell Hernandez MD at [address] [facility] 4/4/2018    CYSTOSCOPY performed by Shasta Montero MD at [address] [facility] ENDOSCOPY  12/28/2016    gastritis, esophagitis,         Medications Prior to Admission:     Prior to Admission medications    Medication Sig Start Date End Date Taking? Authorizing Provider   SITagliptin-metFORMIN (JANUMET XR)  MG TB24 per extended release tablet Take 2 tablets by mouth daily   Yes Historical Provider, MD   sulfamethoxazole-trimethoprim (BACTRIM DS) 800-160 MG per tablet Take 1 tablet by mouth 2 times daily for 10 days 8/13/18 8/23/18 Yes Lesly Hill DO   atorvastatin (LIPITOR) 20 MG tablet TAKE 1 TABLET DAILY 7/20/18  Yes Lesly Hill DO   fluconazole (DIFLUCAN) 150 MG tablet Take 1 tablet by mouth daily 7/17/18  Yes Lesly Hill DO   spironolactone (ALDACTONE) 50 MG tablet Take 1 tablet by mouth daily 7/17/18  Yes Lesly Hill DO   ALPRAZolam (XANAX) 0.25 MG tablet Take 1 tablet by mouth 3 times daily as needed for Anxiety for up to 90 days. . 7/6/18 10/4/18 Yes Lesly Hill DO   butalbital-acetaminophen-caffeine (FIORICET, ESGIC) -40 MG per tablet Take 1 tablet by mouth every 4 hours as needed for Headaches 6/7/18  Yes Jose Perez, DO   nystatin (MYCOSTATIN) 820254 UNIT/GM cream Apply topically 2 times daily.  6/7/18  Yes Jose Perez DO   furosemide (LASIX) 20 MG tablet Take 1 tablet by mouth daily  Patient taking differently: Take 40 mg by mouth daily  6/8/18  Yes Jose Perez DO   carbidopa-levodopa (SINEMET CR)  MG per extended release tablet Take 1 tablet by mouth 4 times daily   Yes Historical Provider, MD   calcium citrate (CALCITRATE) 250 MG TABS tablet Take 250 mg by mouth 3 times daily   Yes Historical Provider, MD   magnesium oxide (MAG-OX) 400 MG tablet Take 400 mg by mouth daily   Yes Historical Provider, MD   polyethylene glycol (GLYCOLAX) packet Take 17 g by mouth daily as needed for Constipation   Yes Historical Provider, MD   senna (SENOKOT) 8.6 MG tablet Take 2 tablets by mouth daily   Yes Historical Provider, MD   conjugated estrogens (PREMARIN) 0.625 MG/GM vaginal cream Place 0.5 g vaginally every other day 4/4/18  Yes Marilee Rocha MD metoprolol tartrate (LOPRESSOR) 25 MG tablet Take 0.5 tablets by mouth 2 times daily 3/10/18  Yes Pratima Gage MD   aspirin 81 MG tablet Take 81 mg by mouth daily LAST DOSE 03/31/2018   Yes Historical Provider, MD   rOPINIRole (REQUIP) 2 MG tablet Take 2 mg by mouth 3 times daily   Yes Historical Provider, MD   pantoprazole (PROTONIX) 40 MG tablet Take 1 tablet by mouth 2 times daily (before meals) 1/22/18  Yes Lesly Hill DO   BREO ELLIPTA 100-25 MCG/INH AEPB inhaler Inhale 1 puff into the lungs daily 1/22/18  Yes Lesly Hill DO   ferrous sulfate 325 (65 Fe) MG EC tablet Take 1 tablet by mouth 2 times daily (with meals) 12/21/17  Yes Jose Perez DO   vitamin D (CHOLECALCIFEROL) 5000 units CAPS capsule Take 5,000 Units by mouth daily   Yes Historical Provider, MD   rasagiline mesylate 1 MG TABS Take 1 tablet by mouth daily   Yes Historical Provider, MD   calcitRIOL (ROCALTROL) 0.25 MCG capsule TAKE 1 CAPSULE THREE TIMES A DAY 11/9/17  Yes Lesly Hill DO   amiodarone (CORDARONE) 200 MG tablet Take 1 tablet by mouth daily 8/27/17  Yes Jose Perez DO   albuterol (PROVENTIL) (2.5 MG/3ML) 0.083% nebulizer solution Take 3 mLs by nebulization every 6 hours as needed for Wheezing 7/18/17  Yes Lesly Hill DO   Oxygen Concentrator Dx: Pneumonia, use as directed. Patient taking differently: Dx: Pneumonia, use as directed. ON 24/7 2/10/17  Yes Lesly Hill DO   ondansetron (ZOFRAN) 8 MG tablet TK 1 T PO Q 8 H PRF NAUSEA OR VOM 5/17/18   Historical Provider, MD   HYDROcodone-acetaminophen ([address]) 5-325 MG per tablet TK 1 T PO BID PRF PAIN 7/16/18   Historical Provider, MD        Allergies:     Dye [barium-containing compounds]; Pcn [penicillins]; and Red dye    Social History:     Tobacco:    reports that she quit smoking about 47 years ago. Her smoking use included Cigarettes. She has a 30.00 pack-year smoking history.  She has never used smokeless tobacco.  Alcohol:      reports that she drinks about 1.2 oz of alcohol per week . Drug Use:  reports that she does not use drugs. Family History:     Family History   Problem Relation Age of Onset    Heart Failure Mother     Hypertension Mother     Heart Disease Mother     High Blood Pressure Mother        Review of Systems:     Positive and Negative as described in HPI. CONSTITUTIONAL:  negative for fevers, chills, sweats, weight loss, Positive for fatigue  HEENT:  negative for vision, hearing changes, runny nose, throat pain  RESPIRATORY:  negative for shortness of breath, cough, congestion, positive for wheezing. CARDIOVASCULAR:  negative for chest pain, palpitations.   GASTROINTESTINAL:  Positive for nausea, vomiting, diarrhea, negative for constipation, change in bowel habits, abdominal pain   GENITOURINARY:  negative for difficulty of urination, burning with urination, frequency   INTEGUMENT:  negative for rash, skin lesions, easy bruising   HEMATOLOGIC/LYMPHATIC:  negative for swelling/edema   ALLERGIC/IMMUNOLOGIC:  negative for urticaria , itching  ENDOCRINE:  negative increase in drinking, increase in urination, hot or cold intolerance  MUSCULOSKELETAL:  negative joint pains, muscle aches, swelling of joints  NEUROLOGICAL:  Positive for headaches, weakness, negative for dizziness, lightheadedness, numbness, pain, tingling extremities  BEHAVIOR/PSYCH:  negative for depression, anxiety    Physical Exam:   BP (!) 69/53   Pulse 66   Temp 98 °F (36.7 °C) (Oral)   Resp 17   Ht 5' 1\" (1.549 m)   Wt 185 lb 9.6 oz (84.2 kg)   LMP 2004 (Within Years)   SpO2 100%   BMI 35.07 kg/m²   Temp (24hrs), Av.6 °F (36.4 °C), Min:97.3 °F (36.3 °C), Max:98 °F (36.7 °C)    Recent Labs      18   0551  18   0632  18   1207   POCGLU  57*  93  113*       Intake/Output Summary (Last 24 hours) at 18 1338  Last data filed at 18 1200   Gross per 24 hour   Intake                0 ml   Output              600 ml   Net -600 ml       General Appearance:  alert, Chronically ill appearing, and in no acute distress  Mental status: oriented to person, place, and time with normal affect  Head:  normocephalic, atraumatic. Eye: no icterus, redness, pupils equal and reactive, extraocular eye movements intact, conjunctiva clear  Ear: normal external ear, no discharge, hearing intact  Nose:  no drainage noted  Mouth: mucous membranes moist  Neck: supple, no carotid bruits, thyroid not palpable  Lungs: Bilateral equal air entry, bilateral wheezing, normal effort  Cardiovascular: normal rate, regular rhythm, no gallop, rub.   Systolic murmur  Abdomen: Soft, nontender, nondistended, normal bowel sounds, no hepatomegaly or splenomegaly  Neurologic: There are no new focal motor or sensory deficits, normal muscle tone and bulk, no abnormal sensation, normal speech, cranial nerves II through XII grossly intact  Skin: No gross lesions, rashes, bruising or bleeding on exposed skin area  Extremities:  peripheral pulses palpable, no pedal edema or calf pain with palpation  Psych: normal affect     Investigations:      Laboratory Testing:  Recent Results (from the past 24 hour(s))   EKG 12 Lead    Collection Time: 08/20/18  1:28 AM   Result Value Ref Range    Ventricular Rate 70 BPM    Atrial Rate 70 BPM    P-R Interval 236 ms    QRS Duration 98 ms    Q-T Interval 446 ms    QTc Calculation (Bazett) 481 ms    P Axis 69 degrees    R Axis -21 degrees    T Axis 57 degrees   CBC Auto Differential    Collection Time: 08/20/18  2:05 AM   Result Value Ref Range    WBC 7.4 3.5 - 11.0 k/uL    RBC 2.92 (L) 4.0 - 5.2 m/uL    Hemoglobin 9.8 (L) 12.0 - 16.0 g/dL    Hematocrit 30.4 (L) 36 - 46 %    .2 (H) 80 - 100 fL    MCH 33.6 26 - 34 pg    MCHC 32.3 31 - 37 g/dL    RDW 14.9 (H) 11.5 - 14.5 %    Platelets 356 (L) 209 - 400 k/uL    MPV 8.9 6.0 - 12.0 fL    NRBC Automated NOT REPORTED per 100 WBC    Differential Type NOT REPORTED     Seg Neutrophils 2:05 AM   Result Value Ref Range    Lactic Acid 10.0 (H) 0.5 - 2.2 mmol/L   Magnesium    Collection Time: 08/20/18  2:05 AM   Result Value Ref Range    Magnesium 2.1 1.6 - 2.6 mg/dL   Culture Blood #1    Collection Time: 08/20/18  2:05 AM   Result Value Ref Range    Specimen Description . BLOOD     Special Requests R HAND 10 ML     Culture NO GROWTH 4 HOURS     Status Pending    Urinalysis Reflex to Culture    Collection Time: 08/20/18  3:00 AM   Result Value Ref Range    Color, UA YELLOW YEL    Turbidity UA SLIGHTLY CLOUDY (A) CLEAR    Glucose, Ur NEGATIVE NEG    Bilirubin Urine NEGATIVE NEG    Ketones, Urine NEGATIVE NEG    Specific Gravity, UA 1.020 1.005 - 1.030    Urine Hgb 3+ (A) NEG    pH, UA 6.5 5.0 - 8.0    Protein, UA 1+ (A) NEG    Urobilinogen, Urine Normal NORM    Nitrite, Urine NEGATIVE NEG    Leukocyte Esterase, Urine SMALL (A) NEG    Urinalysis Comments NOT REPORTED    Microscopic Urinalysis    Collection Time: 08/20/18  3:00 AM   Result Value Ref Range    -          WBC, UA 5 TO 10 0 - 5 /HPF    RBC, UA 10 TO 20 0 - 2 /HPF    Casts UA NOT REPORTED /LPF    Crystals UA NOT REPORTED NONE /HPF    Epithelial Cells UA 2 TO 5 0 - 5 /HPF    Renal Epithelial, Urine NOT REPORTED 0 /HPF    Bacteria, UA FEW (A) NONE    Mucus, UA NOT REPORTED NONE    Trichomonas, UA NOT REPORTED NONE    Amorphous, UA 1+ (A) NONE    Other Observations UA NOT REPORTED NREQ    Yeast, UA NOT REPORTED NONE   Culture Blood #1    Collection Time: 08/20/18  3:45 AM   Result Value Ref Range    Specimen Description . BLOOD     Special Requests LT HAND 7ML     Culture NO GROWTH 4 HOURS     Status Pending    Lactic Acid    Collection Time: 08/20/18  4:52 AM   Result Value Ref Range    Lactic Acid 10.8 (H) 0.5 - 2.2 mmol/L   POC Glucose Fingerstick    Collection Time: 08/20/18  5:51 AM   Result Value Ref Range    POC Glucose 57 (L) 65 - 105 mg/dL   POC Glucose Fingerstick    Collection Time: 08/20/18  6:32 AM   Result Value Ref Range    POC labs  5. Levophed drip for blood pressure support  6. Antiemetics as needed  7. GI and DVT prophylaxis  8. Discussed with family at bedside  9. IV antibiotics for urinary tract infection pending culture  10. PT and OT  11. Glycemic control, insulin scale  12. Aerosol protocol  13. Continue home medications, hold diuretics and nephrotoxic agents  14. See orders for details      Consultations:   IP CONSULT TO PHARMACY  IP CONSULT TO INTERNAL MEDICINE  IP CONSULT TO PHARMACY  IP CONSULT TO NEPHROLOGY     Patient is admitted as inpatient status because of co-morbidities listed above, severity of signs and symptoms as outlined, requirement for current medical therapies and most importantly because of direct risk to patient if care not provided in a hospital setting.     Melyssa Medrano DO  8/20/2018  1:38 PM    Copy sent to Dr. Abdiel Alexander DO

## 2018-08-21 LAB
ABSOLUTE EOS #: 0 K/UL (ref 0–0.4)
ABSOLUTE IMMATURE GRANULOCYTE: ABNORMAL K/UL (ref 0–0.3)
ABSOLUTE LYMPH #: 1.3 K/UL (ref 1–4.8)
ABSOLUTE MONO #: 1 K/UL (ref 0.2–0.8)
ANION GAP SERPL CALCULATED.3IONS-SCNC: 14 MMOL/L (ref 9–17)
BASOPHILS # BLD: 0 % (ref 0–2)
BASOPHILS ABSOLUTE: 0 K/UL (ref 0–0.2)
BUN BLDV-MCNC: 47 MG/DL (ref 8–23)
BUN/CREAT BLD: 11 (ref 9–20)
CALCIUM IONIZED: 1.27 MMOL/L (ref 1.13–1.33)
CALCIUM SERPL-MCNC: 9 MG/DL (ref 8.6–10.4)
CHLORIDE BLD-SCNC: 102 MMOL/L (ref 98–107)
CO2: 24 MMOL/L (ref 20–31)
CREAT SERPL-MCNC: 4.21 MG/DL (ref 0.5–0.9)
CULTURE: NO GROWTH
DIFFERENTIAL TYPE: ABNORMAL
EOSINOPHILS RELATIVE PERCENT: 0 % (ref 1–4)
FREE KAPPA/LAMBDA RATIO: 2.12 (ref 0.26–1.65)
GFR AFRICAN AMERICAN: 13 ML/MIN
GFR NON-AFRICAN AMERICAN: 10 ML/MIN
GFR SERPL CREATININE-BSD FRML MDRD: ABNORMAL ML/MIN/{1.73_M2}
GFR SERPL CREATININE-BSD FRML MDRD: ABNORMAL ML/MIN/{1.73_M2}
GLUCOSE BLD-MCNC: 120 MG/DL (ref 65–105)
GLUCOSE BLD-MCNC: 122 MG/DL (ref 65–105)
GLUCOSE BLD-MCNC: 131 MG/DL (ref 65–105)
GLUCOSE BLD-MCNC: 83 MG/DL (ref 65–105)
GLUCOSE BLD-MCNC: 99 MG/DL (ref 70–99)
HBV SURFACE AB TITR SER: <3.5 MIU/ML
HCT VFR BLD CALC: 27.5 % (ref 36–46)
HEMOGLOBIN: 8.9 G/DL (ref 12–16)
HEPATITIS C ANTIBODY: NONREACTIVE
IMMATURE GRANULOCYTES: ABNORMAL %
INR BLD: 1
KAPPA FREE LIGHT CHAINS QNT: 3.41 MG/DL (ref 0.37–1.94)
LAMBDA FREE LIGHT CHAINS QNT: 1.61 MG/DL (ref 0.57–2.63)
LYMPHOCYTES # BLD: 14 % (ref 24–44)
Lab: NORMAL
MAGNESIUM: 2.2 MG/DL (ref 1.6–2.6)
MCH RBC QN AUTO: 33.3 PG (ref 26–34)
MCHC RBC AUTO-ENTMCNC: 32.3 G/DL (ref 31–37)
MCV RBC AUTO: 103.1 FL (ref 80–100)
MONOCYTES # BLD: 11 % (ref 1–7)
NRBC AUTOMATED: ABNORMAL PER 100 WBC
PDW BLD-RTO: 14.9 % (ref 11.5–14.5)
PLATELET # BLD: 127 K/UL (ref 130–400)
PLATELET ESTIMATE: ABNORMAL
PMV BLD AUTO: 8.1 FL (ref 6–12)
POTASSIUM SERPL-SCNC: 5 MMOL/L (ref 3.7–5.3)
PROTHROMBIN TIME: 10.7 SEC (ref 9.7–11.6)
RBC # BLD: 2.67 M/UL (ref 4–5.2)
RBC # BLD: ABNORMAL 10*6/UL
SEG NEUTROPHILS: 75 % (ref 36–66)
SEGMENTED NEUTROPHILS ABSOLUTE COUNT: 6.8 K/UL (ref 1.8–7.7)
SODIUM BLD-SCNC: 140 MMOL/L (ref 135–144)
SPECIMEN DESCRIPTION: NORMAL
STATUS: NORMAL
WBC # BLD: 9.2 K/UL (ref 3.5–11)
WBC # BLD: ABNORMAL 10*3/UL

## 2018-08-21 PROCEDURE — 94761 N-INVAS EAR/PLS OXIMETRY MLT: CPT

## 2018-08-21 PROCEDURE — 86038 ANTINUCLEAR ANTIBODIES: CPT

## 2018-08-21 PROCEDURE — 2500000003 HC RX 250 WO HCPCS: Performed by: NURSE PRACTITIONER

## 2018-08-21 PROCEDURE — 83735 ASSAY OF MAGNESIUM: CPT

## 2018-08-21 PROCEDURE — 6370000000 HC RX 637 (ALT 250 FOR IP): Performed by: NURSE PRACTITIONER

## 2018-08-21 PROCEDURE — 6370000000 HC RX 637 (ALT 250 FOR IP): Performed by: INTERNAL MEDICINE

## 2018-08-21 PROCEDURE — 83516 IMMUNOASSAY NONANTIBODY: CPT

## 2018-08-21 PROCEDURE — 36415 COLL VENOUS BLD VENIPUNCTURE: CPT

## 2018-08-21 PROCEDURE — 80048 BASIC METABOLIC PNL TOTAL CA: CPT

## 2018-08-21 PROCEDURE — 2580000003 HC RX 258: Performed by: EMERGENCY MEDICINE

## 2018-08-21 PROCEDURE — 86317 IMMUNOASSAY INFECTIOUS AGENT: CPT

## 2018-08-21 PROCEDURE — 6360000002 HC RX W HCPCS: Performed by: NURSE PRACTITIONER

## 2018-08-21 PROCEDURE — 2000000000 HC ICU R&B

## 2018-08-21 PROCEDURE — 85610 PROTHROMBIN TIME: CPT

## 2018-08-21 PROCEDURE — 2700000000 HC OXYGEN THERAPY PER DAY

## 2018-08-21 PROCEDURE — 97530 THERAPEUTIC ACTIVITIES: CPT

## 2018-08-21 PROCEDURE — 94640 AIRWAY INHALATION TREATMENT: CPT

## 2018-08-21 PROCEDURE — 2580000003 HC RX 258: Performed by: NURSE PRACTITIONER

## 2018-08-21 PROCEDURE — 2580000003 HC RX 258: Performed by: RADIOLOGY

## 2018-08-21 PROCEDURE — 85025 COMPLETE CBC W/AUTO DIFF WBC: CPT

## 2018-08-21 PROCEDURE — 82947 ASSAY GLUCOSE BLOOD QUANT: CPT

## 2018-08-21 PROCEDURE — 97162 PT EVAL MOD COMPLEX 30 MIN: CPT

## 2018-08-21 PROCEDURE — 83883 ASSAY NEPHELOMETRY NOT SPEC: CPT

## 2018-08-21 PROCEDURE — 99232 SBSQ HOSP IP/OBS MODERATE 35: CPT | Performed by: INTERNAL MEDICINE

## 2018-08-21 PROCEDURE — 82330 ASSAY OF CALCIUM: CPT

## 2018-08-21 PROCEDURE — 86803 HEPATITIS C AB TEST: CPT

## 2018-08-21 RX ADMIN — ASPIRIN 81 MG: 81 TABLET, COATED ORAL at 09:10

## 2018-08-21 RX ADMIN — CALCITRIOL 0.25 MCG: 0.25 CAPSULE ORAL at 09:09

## 2018-08-21 RX ADMIN — PANTOPRAZOLE SODIUM 40 MG: 40 TABLET, DELAYED RELEASE ORAL at 05:59

## 2018-08-21 RX ADMIN — CALCITRIOL 0.25 MCG: 0.25 CAPSULE ORAL at 13:40

## 2018-08-21 RX ADMIN — PANTOPRAZOLE SODIUM 40 MG: 40 TABLET, DELAYED RELEASE ORAL at 19:24

## 2018-08-21 RX ADMIN — AZTREONAM 500 MG: 1 INJECTION, POWDER, LYOPHILIZED, FOR SOLUTION INTRAMUSCULAR; INTRAVENOUS at 05:57

## 2018-08-21 RX ADMIN — ALPRAZOLAM 0.25 MG: 0.25 TABLET ORAL at 01:37

## 2018-08-21 RX ADMIN — ATORVASTATIN CALCIUM 20 MG: 20 TABLET, FILM COATED ORAL at 20:47

## 2018-08-21 RX ADMIN — Medication 10 ML: at 20:50

## 2018-08-21 RX ADMIN — CALCITRIOL 0.25 MCG: 0.25 CAPSULE ORAL at 20:47

## 2018-08-21 RX ADMIN — MAGNESIUM OXIDE TAB 400 MG (241.3 MG ELEMENTAL MG) 400 MG: 400 (241.3 MG) TAB at 09:09

## 2018-08-21 RX ADMIN — MICONAZOLE NITRATE: 20.6 POWDER TOPICAL at 20:50

## 2018-08-21 RX ADMIN — RASAGILINE 1 MG: 1 TABLET ORAL at 09:12

## 2018-08-21 RX ADMIN — AMIODARONE HYDROCHLORIDE 200 MG: 200 TABLET ORAL at 09:09

## 2018-08-21 RX ADMIN — SODIUM CHLORIDE: 9 INJECTION, SOLUTION INTRAVENOUS at 05:58

## 2018-08-21 RX ADMIN — ANTACID TABLETS 250 MG: 500 TABLET, CHEWABLE ORAL at 20:47

## 2018-08-21 RX ADMIN — AZTREONAM 500 MG: 1 INJECTION, POWDER, LYOPHILIZED, FOR SOLUTION INTRAMUSCULAR; INTRAVENOUS at 13:40

## 2018-08-21 RX ADMIN — ROPINIROLE HYDROCHLORIDE 2 MG: 2 TABLET, FILM COATED ORAL at 20:47

## 2018-08-21 RX ADMIN — FERROUS SULFATE TAB EC 325 MG (65 MG FE EQUIVALENT) 325 MG: 325 (65 FE) TABLET DELAYED RESPONSE at 19:25

## 2018-08-21 RX ADMIN — HYDROCODONE BITARTRATE AND ACETAMINOPHEN 1 TABLET: 5; 325 TABLET ORAL at 09:09

## 2018-08-21 RX ADMIN — ANTACID TABLETS 250 MG: 500 TABLET, CHEWABLE ORAL at 13:40

## 2018-08-21 RX ADMIN — ROPINIROLE HYDROCHLORIDE 2 MG: 2 TABLET, FILM COATED ORAL at 13:40

## 2018-08-21 RX ADMIN — ANTACID TABLETS 250 MG: 500 TABLET, CHEWABLE ORAL at 09:09

## 2018-08-21 RX ADMIN — ENOXAPARIN SODIUM 30 MG: 100 INJECTION SUBCUTANEOUS at 09:08

## 2018-08-21 RX ADMIN — HYDROCODONE BITARTRATE AND ACETAMINOPHEN 1 TABLET: 5; 325 TABLET ORAL at 03:58

## 2018-08-21 RX ADMIN — LINAGLIPTIN 5 MG: 5 TABLET, FILM COATED ORAL at 12:46

## 2018-08-21 RX ADMIN — FERROUS SULFATE TAB EC 325 MG (65 MG FE EQUIVALENT) 325 MG: 325 (65 FE) TABLET DELAYED RESPONSE at 09:10

## 2018-08-21 RX ADMIN — IPRATROPIUM BROMIDE AND ALBUTEROL SULFATE 1 AMPULE: .5; 3 SOLUTION RESPIRATORY (INHALATION) at 15:21

## 2018-08-21 RX ADMIN — ROPINIROLE HYDROCHLORIDE 2 MG: 2 TABLET, FILM COATED ORAL at 09:09

## 2018-08-21 RX ADMIN — CARBIDOPA AND LEVODOPA 1 TABLET: 25; 100 TABLET, EXTENDED RELEASE ORAL at 22:23

## 2018-08-21 RX ADMIN — IPRATROPIUM BROMIDE AND ALBUTEROL SULFATE 1 AMPULE: .5; 3 SOLUTION RESPIRATORY (INHALATION) at 19:44

## 2018-08-21 RX ADMIN — PROMETHAZINE HYDROCHLORIDE 12.5 MG: 25 INJECTION INTRAMUSCULAR; INTRAVENOUS at 19:59

## 2018-08-21 RX ADMIN — Medication 10 ML: at 09:08

## 2018-08-21 RX ADMIN — CARBIDOPA AND LEVODOPA 1 TABLET: 25; 100 TABLET, EXTENDED RELEASE ORAL at 09:10

## 2018-08-21 RX ADMIN — CHOLECALCIFEROL CAP 125 MCG (5000 UNIT) 5000 UNITS: 125 CAP at 09:09

## 2018-08-21 RX ADMIN — CARBIDOPA AND LEVODOPA 1 TABLET: 25; 100 TABLET, EXTENDED RELEASE ORAL at 12:46

## 2018-08-21 RX ADMIN — CARBIDOPA AND LEVODOPA 1 TABLET: 25; 100 TABLET, EXTENDED RELEASE ORAL at 19:25

## 2018-08-21 RX ADMIN — CONJUGATED ESTROGENS 0.5 G: 0.62 CREAM VAGINAL at 09:06

## 2018-08-21 RX ADMIN — MICONAZOLE NITRATE: 20.6 POWDER TOPICAL at 09:06

## 2018-08-21 RX ADMIN — ALPRAZOLAM 0.25 MG: 0.25 TABLET ORAL at 22:23

## 2018-08-21 RX ADMIN — AZTREONAM 500 MG: 1 INJECTION, POWDER, LYOPHILIZED, FOR SOLUTION INTRAMUSCULAR; INTRAVENOUS at 22:28

## 2018-08-21 RX ADMIN — IPRATROPIUM BROMIDE AND ALBUTEROL SULFATE 1 AMPULE: .5; 3 SOLUTION RESPIRATORY (INHALATION) at 09:55

## 2018-08-21 ASSESSMENT — PAIN SCALES - GENERAL
PAINLEVEL_OUTOF10: 0
PAINLEVEL_OUTOF10: 10
PAINLEVEL_OUTOF10: 9

## 2018-08-21 ASSESSMENT — PAIN DESCRIPTION - ORIENTATION: ORIENTATION: RIGHT

## 2018-08-21 ASSESSMENT — PAIN DESCRIPTION - LOCATION: LOCATION: HAND;HEAD

## 2018-08-21 ASSESSMENT — PAIN DESCRIPTION - PAIN TYPE: TYPE: ACUTE PAIN

## 2018-08-21 NOTE — PLAN OF CARE
Problem: Risk for Impaired Skin Integrity  Goal: Tissue integrity - skin and mucous membranes  Structural intactness and normal physiological function of skin and  mucous membranes. Outcome: Ongoing  Skin assessment performed. No new signs of skin breakdown. Patient repositions self. Pillow support provided as needed. Problem: Falls - Risk of:  Goal: Will remain free from falls  Will remain free from falls   Outcome: Not Met This Shift  The patient remained free from falls this shift, call light within reach, bed in locked and lowest position. Side rails up x2. Continue to monitor closely. Problem: Pain:  Goal: Control of acute pain  Control of acute pain   Outcome: Ongoing  Patient rates pain using 0 - 10 scale. Patient alerts staff when pain medications are needed.

## 2018-08-21 NOTE — PROGRESS NOTES
atorvastatin  20 mg Oral Nightly    mometasone-formoterol  2 puff Inhalation BID    calcitRIOL  0.25 mcg Oral TID    carbidopa-levodopa  1 tablet Oral 4x Daily    conjugated estrogens  0.5 g Vaginal Every Other Day    ferrous sulfate  325 mg Oral BID WC    metoprolol tartrate  12.5 mg Oral BID    pantoprazole  40 mg Oral BID AC    rasagiline mesylate  1 tablet Oral Daily    rOPINIRole  2 mg Oral TID    senna  2 tablet Oral Daily    vitamin D  5,000 Units Oral Daily    sodium chloride flush  10 mL Intravenous 2 times per day    enoxaparin  30 mg Subcutaneous Daily    aztreonam  500 mg Intravenous Q8H    insulin lispro  0-12 Units Subcutaneous TID WC    insulin lispro  0-6 Units Subcutaneous Nightly    aminoglycoside intermittent dosing (placeholder)   Other RX Placeholder    linagliptin  5 mg Oral Daily    sodium chloride flush  10 mL Intravenous 2 times per day    miconazole   Topical BID    ipratropium-albuterol  1 ampule Inhalation TID    calcium carbonate  250 mg Oral TID    magnesium oxide  400 mg Oral Daily     Continuous Infusions:    sodium chloride 100 mL/hr at 08/21/18 0558    dextrose      norepinephrine 2 mcg/min (08/21/18 0556)     PRN Meds: ALPRAZolam, HYDROcodone-acetaminophen, polyethylene glycol, sodium chloride flush, glucose, dextrose, glucagon (rDNA), dextrose, ondansetron **OR** ondansetron, sodium chloride flush, albuterol, promethazine    Data:     Past Medical History:   has a past medical history of Acute on chronic diastolic congestive heart failure ([facility] Anesthesia complication; Asthma; Atrial fibrillation ([facility] Cataracts, bilateral; Cellulitis; Cerebral artery occlusion with cerebral infarction ([facility] Chronic kidney disease; Constipation; COPD (chronic obstructive pulmonary disease) ([facility] Difficult intravenous access; Diverticulosis; DM2 (diabetes mellitus, type 2) ([facility] Full dentures; GERD (gastroesophageal reflux disease);  Headache(784.0); [place] (hard of hearing); Hyperlipidemia; Hyperplastic polyp of intestine; Hypertension; Impaired ambulation; Kidney stone; Morbid obesity with BMI of 40.0-44.9, adult ([facility] ECTOR on CPAP; Osteoarthritis; Parkinson disease ([facility] Restless leg syndrome; Spinal stenosis in cervical region; Type II or unspecified type diabetes mellitus without mention of complication, not stated as uncontrolled; Unspecified sleep apnea; Urethral caruncle; and Wears glasses. Social History:   reports that she quit smoking about 47 years ago. Her smoking use included Cigarettes. She has a 30.00 pack-year smoking history. She has never used smokeless tobacco. She reports that she drinks about 1.2 oz of alcohol per week . She reports that she does not use drugs. Family History:   Family History   Problem Relation Age of Onset    Heart Failure Mother     Hypertension Mother     Heart Disease Mother     High Blood Pressure Mother        Vitals:  BP (!) 121/36   Pulse 88   Temp 98.6 °F (37 °C) (Oral)   Resp 15   Ht 5' 1\" (1.549 m)   Wt 185 lb 9.6 oz (84.2 kg)   LMP 2004 (Within Years)   SpO2 97%   BMI 35.07 kg/m²   Temp (24hrs), Av.4 °F (36.9 °C), Min:97.8 °F (36.6 °C), Max:98.8 °F (37.1 °C)    Recent Labs      18   0632  18   1207  18   1720  18   2035   POCGLU  93  113*  75  84       I/O (24Hr):     Intake/Output Summary (Last 24 hours) at 18 0908  Last data filed at 18 0545   Gross per 24 hour   Intake             6253 ml   Output             2050 ml   Net             4203 ml       Labs:    Hematology:  Recent Labs      18   0205  18   0553   WBC  7.4  9.2   RBC  2.92*  2.67*   HGB  9.8*  8.9*   HCT  30.4*  27.5*   MCV  104.2*  103.1*   MCH  33.6  33.3   MCHC  32.3  32.3   RDW  14.9*  14.9*   PLT  116*  127*   MPV  8.9  8.1   INR   --   1.0     Chemistry:  Recent Labs      18   0205  18   0726  18   1542  18   0553   NA  139   --    --   140   K  4.8   -- --   5.0   CL  93*   --    --   102   CO2  19*   --    --   24   GLUCOSE  50*   --    --   99   BUN  42*   --    --   47*   CREATININE  4.09*   --    --   4.21*   MG  2.1   --    --   2.2   ANIONGAP  27*   --    --   14   LABGLOM  11*   --    --   10*   GFRAA  13*   --    --   13*   CALCIUM  10.2   --    --   9.0   CAION   --    --    --   1.27   TROPONINT  <0.03  <0.03  <0.03   --    MYOGLOBIN  167*   --    --    --      Recent Labs      08/20/18   0205  08/20/18   0551  08/20/18   0632  08/20/18   1207  08/20/18   1720  08/20/18   2035   PROT  6.7   --    --    --    --    --    LABALBU  4.2   --    --    --    --    --    AST  21   --    --    --    --    --    ALT  6   --    --    --    --    --    ALKPHOS  41   --    --    --    --    --    BILITOT  0.11*   --    --    --    --    --    BILIDIR  <0.08   --    --    --    --    --    AMYLASE  51   --    --    --    --    --    LIPASE  31   --    --    --    --    --    POCGLU   --   57*  93  113*  75  84         Lab Results   Component Value Date/Time    SPECIAL LT HAND 7ML 08/20/2018 03:45 AM     Lab Results   Component Value Date/Time    CULTURE NO GROWTH 21 HOURS 08/20/2018 03:45 AM       Lab Results   Component Value Date    POCPH 7.36 06/02/2013    POCPCO2 55 06/02/2013    POCPO2 63 06/02/2013    POCHCO3 31.0 06/02/2013    NBEA NOT REPORTED 06/02/2013    PBEA 6 06/02/2013    [id number] 33 06/02/2013    AWTM6YSA 90 06/02/2013    FIO2 45.0 01/15/2017       Radiology:    No new radiology reports    Physical Examination:        General appearance:  alert, cooperative and no distress  Mental Status:  oriented to person, place and time and normal affect  Lungs:  clear to auscultation bilaterally, normal effort  Heart:  regular rate and rhythm, no murmur  Abdomen:  soft, nontender, nondistended, normal bowel sounds, no masses, hepatomegaly, splenomegaly  Extremities:  no edema, redness, tenderness in the calves  Skin:  no gross lesions, rashes, induration    Assessment:        Primary Problem  Acute kidney injury superimposed on chronic kidney disease [facility])    Active Hospital Problems    Diagnosis Date Noted    Sepsis ([facility] [A41.9] 08/20/2018    Nausea and vomiting in adult [R11.2] 08/20/2018    Acute kidney injury superimposed on chronic kidney disease ([facility] [N17.9, N18.9] 08/20/2018    Bacterial UTI [N39.0, A49.9] 08/20/2018    Dehydration [E86.0]     Anemia of chronic renal failure, stage 4 (severe) ([facility] [N18.4, D63.1] 04/25/2018    CKD stage 4 due to type 2 diabetes mellitus ([facility] [K39.59, N18.4] 03/08/2018    ECTOR on CPAP [G47.33, Z99.89]     Chronic atrial fibrillation ([facility] [I48.2] 05/31/2013    Essential hypertension [I10] 06/07/2012    Controlled type 2 diabetes mellitus with stage 3 chronic kidney disease, without long-term current use of insulin ([facility] [E11.22, N18.3]        Plan:        1. IV fluids per nephrology  2. Monitor and control blood pressure, avoid hypotension  3. Avoid nephrotoxic agents  4. Advance diet  5. Monitor and control blood pressure  6. Glycemic control, insulin scale  7. Antiemetics as needed  8. GI and DVT prophylaxis  9. PT and OT for strengthening  10. Continue antibiotics pending culture results  11.  Follow labs, supplement electrolytes as needed    Robinson Barajas DO  8/21/2018  9:08 AM

## 2018-08-21 NOTE — PROGRESS NOTES
Physical Therapy    Facility/Department: [facility] ICU  Initial Assessment    NAME: Sherly Lopez  : 1945  MRN: [id number]    Date of Service: 2018    Discharge Recommendations:  [address]      Patient Diagnosis(es): The primary encounter diagnosis was Acute renal failure, unspecified acute renal failure type ([facility] Diagnoses of Lactic acidemia, Nausea, Chronic anemia, Hypoglycemia, Dehydration, History of drug resistance, SIRS (systemic inflammatory response syndrome) ([facility] Acute cystitis with hematuria, and History of cellulitis were also pertinent to this visit. has a past medical history of Acute on chronic diastolic congestive heart failure ([address] Anesthesia complication; Asthma; Atrial fibrillation ([address] Cataracts, bilateral; Cellulitis; Cerebral artery occlusion with cerebral infarction ([facility] Chronic kidney disease; Constipation; COPD (chronic obstructive pulmonary disease) ([facility] Difficult intravenous access; Diverticulosis; DM2 (diabetes mellitus, type 2) ([facility] Full dentures; GERD (gastroesophageal reflux disease); Headache(784.0); [place] (hard of hearing); Hyperlipidemia; Hyperplastic polyp of intestine; Hypertension; Impaired ambulation; Kidney stone; Morbid obesity with BMI of 40.0-44.9, adult ([address] ECTOR on CPAP; Osteoarthritis; Parkinson disease ([address] Restless leg syndrome; Spinal stenosis in cervical region; Type II or unspecified type diabetes mellitus without mention of complication, not stated as uncontrolled; Unspecified sleep apnea; Urethral caruncle; and Wears glasses. has a past surgical history that includes Cervical disc surgery (); Appendectomy; Tonsillectomy and adenoidectomy (); hernia repair (); eye surgery (Bilateral, 2017); Cervical spine surgery (13); laminectomy (2013); Upper gastrointestinal endoscopy (2016); Colonoscopy (); Colonoscopy (2016); Colonoscopy (2016);  Colonoscopy (2017); pr colsc flx

## 2018-08-21 NOTE — PLAN OF CARE
[facility]    Second Visit Note  For more detailed information please refer to the progress note of the day      8/21/2018    6:16 PM    Name:   Belkys Toure  MRN:     [id number]     Acct:      [de-identified]   Room:   5853/5209-61   Day:  1  Admit Date:  8/20/2018  1:21 AM    PCP:   Juan Ramirez DO  Code Status:  Full Code        Pt vitals were reviewed   New labs were reviewed   Patient was seen    Updated plan :     1. Patient resting, without acute complaints.   We'll increase activity, await repeat labs        Julee Fajardo DO  8/21/2018  6:16 PM

## 2018-08-21 NOTE — CONSULTS
She put out close to 600 in last 24 hours. This morning her creatinine is 4.21. There is no history of NSAID use. Patient patient was not on any angiotensin receptor blocker or converting enzyme inhibitor. She was not receiving any gentamicin. In ER she did not receive any any radiological examination with contrast.        Past Medical History:        Diagnosis Date    Acute on chronic diastolic congestive heart failure (HCC)     Anesthesia complication     DIFFICULTY WAKING OP    Asthma     Atrial fibrillation ([facility] 2013    Cataracts, bilateral     Cellulitis     BILAT LOWER LEGS-PT STATES IS IMPROVING    Cerebral artery occlusion with cerebral infarction [facility])     Last stroke was February 2017 w/no deficits-TOTAL OF 3    Chronic kidney disease 2013    NOT ON DIALYSIS YET    Constipation     CHRONIC    COPD (chronic obstructive pulmonary disease) ([facility] 2008    PT. SEES DR. BECK    Difficult intravenous access     VEINS ROLL    Diverticulosis     DM2 (diabetes mellitus, type 2) ([facility] 2008    Full dentures     FULL UPPER ONLY    GERD (gastroesophageal reflux disease) 2008    ON RX    Headache(784.0)     [place] (hard of hearing)     HAS HEARING AIDS BUT DOES NOT WEAR    Hyperlipidemia     Hyperplastic polyp of intestine     Hypertension 2008    Impaired ambulation     USES TRANFER CHAIR WALKER OR CANE    Kidney stone 2018    PRESENTLY-SMALL    Morbid obesity with BMI of 40.0-44.9, adult ([facility] 12/30/2016    ECTOR on CPAP 2008    USES C-PAP NIGHTLY    Osteoarthritis     [facility] disease ([facility] 2015    Restless leg syndrome 2013    MILD    Spinal stenosis in cervical region 6/2/2013    Type II or unspecified type diabetes mellitus without mention of complication, not stated as uncontrolled     Unspecified sleep apnea     cpap nightly    Urethral caruncle 3/8/2018    Wears glasses        Past Surgical History:        Procedure Laterality Date    APPENDECTOMY      BRONCHOSCOPY 06/05/2018   [facility] SURGERY  2012    CERVICAL SPINE SURGERY  5/31/13    posterior [id number]    COLONOSCOPY  2009    COLONOSCOPY  12/29/2016    incomplete was not cleaned out    COLONOSCOPY  12/30/2016    COLONOSCOPY  04/25/2017     SIGMOID COLON POLYPECTOMY:  HYPERPLASTIC POLYP ,   DIVERTICULOSIS    CYSTORRHAPHY  04/04/2018    EYE SURGERY Bilateral 2017    CATARACTS W/ IOL    HERNIA REPAIR  1999    VENTRAL    LAMINECTOMY  05/31/2013    Dr. Angela Payne DX W/CELL WASHG [facility] N/A 6/5/2018    BRONCHOSCOPY performed by Dc Ruggiero MD at [facility] FLX W/REMOVAL LESION BY HOT BX FORCEPS N/A 4/25/2017    COLONOSCOPY POLYPECTOMY HOT BIOPSY performed by April Narvaez MD at [facility] N/A 4/4/2018    CYSTOSCOPY performed by Sil Worrell MD at [address] [place] [address] ENDOSCOPY  12/28/2016    gastritis, esophagitis,        Current Medications:      0.9 % sodium chloride infusion Continuous   ALPRAZolam (XANAX) tablet 0.25 mg TID PRN   amiodarone (CORDARONE) tablet 200 mg Daily   aspirin EC tablet 81 mg Daily   atorvastatin (LIPITOR) tablet 20 mg Nightly   mometasone-formoterol (DULERA) 200-5 MCG/ACT inhaler 2 puff BID   calcitRIOL (ROCALTROL) capsule 0.25 mcg TID   carbidopa-levodopa (SINEMET CR)  MG per extended release tablet 1 tablet 4x Daily   conjugated estrogens (PREMARIN) vaginal cream 0.5 g Every Other Day   ferrous sulfate EC tablet 325 mg BID WC   HYDROcodone-acetaminophen (NORCO) 5-325 MG per tablet 1 tablet Q4H PRN   metoprolol tartrate (LOPRESSOR) tablet 12.5 mg BID   pantoprazole (PROTONIX) tablet 40 mg BID AC   polyethylene glycol (GLYCOLAX) packet 17 g Daily PRN   rasagiline mesylate TABS 1 mg Daily   rOPINIRole (REQUIP) tablet 2 mg TID   senna (SENOKOT) tablet 17.2 mg Daily   vitamin D (CHOLECALCIFEROL) capsule 5,000 Units Daily   sodium chloride flush 0.9 % injection 10 mL 2 times per day   sodium chloride flush 0.9 % injection 10 mL PRN   enoxaparin (LOVENOX) injection 30 mg Daily   aztreonam (AZACTAM) 500 mg in dextrose 5 % 50 mL IVPB Q8H   glucose (GLUTOSE) 40 % oral gel 15 g PRN   dextrose 50 % solution 12.5 g PRN   glucagon (rDNA) injection 1 mg PRN   dextrose 5 % solution PRN   insulin lispro (HUMALOG) injection vial 0-12 Units TID WC   insulin lispro (HUMALOG) injection vial 0-6 Units Nightly   norepinephrine (LEVOPHED) 16 mg in dextrose 5 % 250 mL infusion Continuous   Tobramycin Dosing by Pharmacy  RX Placeholder   linagliptin (TRADJENTA) tablet 5 mg Daily   ondansetron (ZOFRAN-ODT) disintegrating tablet 4 mg Q6H PRN   Or    ondansetron (ZOFRAN) injection 4 mg Q6H PRN   sodium chloride flush 0.9 % injection 10 mL 2 times per day   sodium chloride flush 0.9 % injection 10 mL PRN   albuterol (PROVENTIL) nebulizer solution 2.5 mg As Directed RT PRN   miconazole (MICOTIN) 2 % powder BID   ipratropium-albuterol (DUONEB) nebulizer solution 1 ampule TID   promethazine (PHENERGAN) injection 12.5 mg Q6H PRN   calcium carbonate (TUMS) chewable tablet 250 mg TID   magnesium oxide (MAG-OX) tablet 400 mg Daily       Allergies:  Dye [barium-containing compounds]; Pcn [penicillins]; and Red dye    Social History:   Social History     Social History    Marital status:      Spouse name: N/A    Number of children: N/A    Years of education: N/A     Occupational History    Not on file. Social History Main Topics    Smoking status: Former Smoker     Packs/day: 2.00     Years: 15.00     Types: Cigarettes     Quit date: 10/31/1970    Smokeless tobacco: Never Used    Alcohol use 1.2 oz/week     2 Shots of liquor per week      Comment: 4 DRINKS A YEAR    Drug use: No      Comment: Pt denies use.      Sexual activity: No      Comment: not for the past year d/t illness, denies  concerns/pain     Other Topics Concern    Not on file     Social History Narrative    ascites  Extremities: Trace to 1+ edema present    Labs:   CBC:  Recent Labs      08/20/18   0205  08/21/18   0553   WBC  7.4  9.2   RBC  2.92*  2.67*   HGB  9.8*  8.9*   HCT  30.4*  27.5*   MCV  104.2*  103.1*   MCH  33.6  33.3   MCHC  32.3  32.3   RDW  14.9*  14.9*   PLT  116*  127*   MPV  8.9  8.1      BMP: Recent Labs      08/20/18   0205  08/21/18   0553   NA  139  140   K  4.8  5.0   CL  93*  102   CO2  19*  24   BUN  42*  47*   CREATININE  4.09*  4.21*   GLUCOSE  50*  99   CALCIUM  10.2  9.0    Magnesium:   Recent Labs      08/20/18 0205  08/21/18   0553   MG  2.1  2.2     Albumin:   Recent Labs      08/20/18   0205   LABALBU  4.2       IRON:    Lab Results   Component Value Date    IRON 76 07/05/2018   TIBC:    Lab Results   Component Value Date    TIBC 304 07/05/2018     FERRITIN:    Lab Results   Component Value Date    FERRITIN 598 07/05/2018     SPEP: Lab Results   Component Value Date    PROT 6.7 08/20/2018   Urine Sodium:    Lab Results   Component Value Date    EDWARD 59 03/09/2018      Urine Potassium:    Lab Results   Component Value Date    KUR 18.0 06/02/2013   Urine Creatinine:    Lab Results   Component Value Date    LABCREA 78.7 06/04/2018     Urine Eosinophils: No components found for: EOSU  Urine Protein:  No results found for: TPU  Urinalysis:  U/A: Lab Results   Component Value Date    NITRU NEGATIVE 08/20/2018    COLORU YELLOW 08/20/2018    PHUR 6.5 08/20/2018    WBCUA 5 TO 10 08/20/2018    RBCUA 10 TO 20 08/20/2018    MUCUS NOT REPORTED 08/20/2018    TRICHOMONAS NOT REPORTED 08/20/2018    YEAST NOT REPORTED 08/20/2018    BACTERIA FEW 08/20/2018    SPECGRAV 1.020 08/20/2018    LEUKOCYTESUR SMALL 08/20/2018    UROBILINOGEN Normal 08/20/2018    BILIRUBINUR NEGATIVE 08/20/2018    GLUCOSEU NEGATIVE 08/20/2018    [address] NEGATIVE 08/20/2018    AMORPHOUS 1+ 08/20/2018         Radiology:  Reviewed as available. Assessment:  1.  Acute kidney injury multifactorial in nature most likely due to prerenal azotemia further complicated by poor oral intake resulting in hypotension other possibilities include acute interstitial nephritis caused by sulfamethoxazole. Her acute renal failure is evolving and creatinine is 4.2 today. 2.  Nausea vomiting and diarrhea etiology not clear I strongly doubt that it was due to ARANESP  3. History of recurrent urinary tract infection  4. History of hypoparathyroidism  5. Hypotension of uncertain etiology  6. Chronic kidney disease with a baseline creatinine of 1.3 mg/dL  7. Long-standing diabetes  8. Long-standing hypertension  9. Mild hyperkalemia due to acute renal failure further complicated by Bactrim use  10. Chronic anemia         Plan:  1. Will Check Renal Ultrasound to r/o element of obstruction and to assess the kidney size/echotexture. 2. Comprehensive urine testing including  Urine sodium, potassium, chloride, Urine protein and creatinine to quantify the proteinuria if any at all. Will check urinary eosinophils as well. 3. Will Order serum protein electrophoresis to r/o element to occult paraprotein disease. 4. Will order Hepatitis B and C, RADHA, ANCA, Complement levels. 3.  Will check free light chain  4. Decrease IV fluids down to 50  5. Check BNP and chest x-ray in a.m.  6.  Avoid nephrotoxic agents  Thank you for the consultation. Please do not hesitate to call with questions.     Electronically signed by Spenser Jesus MD on 8/21/2018 at 9:27 AM

## 2018-08-21 NOTE — CARE COORDINATION
Case Management Initial Discharge Plan  Ana Rucker,         Lety Risk              Risk of Unplanned Readmission:        37               Met with:friend Emily to discuss discharge plans. Information verified: address, contacts, phone number, , insurance Yes  PCP: Jocelyne Crook DO  Date of last visit:     Insurance Provider: Medicare and [facility] [address]    Discharge Planning  Current Residence:  house  Living Arrangements:  Spouse/Significant Other   Home has 1 stories/2 stairs to climb  Support Systems:  Spouse/Significant Other, Children  Current Services PTA:   Private pay HHA    Patient able to perform ADL's:Assisted  DME in home:  oxygen, 24/ thru [facility], CPAP, walker, transport chair, cane, shower chair  DME used to aid ambulation prior to admission:   Sylvia Swenson, transport chair  DME used during admission:  CPAP, oxygen     Potential Assistance Needed:  [address]: [place] [address] and [employer]   Potential Assistance Purchasing Medications:  No  Does patient want to participate in local refill/ meds to beds program?  No    Patient agreeable to home care: TBD  [place] of choice provided:  n/a      Type of Home Care Services:  Nursing Services  Patient expects to be discharged to:  home    Prior SNF/Rehab Placement and Facility: [facility]  Agreeable to SNF/Rehab: TBD  [place] of choice provided: yes   Evaluation: yes    Expected Discharge date:  18  Follow Up Appointment: Best Day/ Time:      Transportation provider: spouse and Emily, caregiver  Transportation arrangements needed for discharge: TBD    Discharge Plan: Pt sleeping on CPAP. Admitted with ARF, sepsis, UTI. Today's  creatinine has increased to 4.21. Renal following. Met and spoke with Linda, paid in home caregiver. Pt lives with spouse and their dtr Nhi. Spouse works during the day. [employer] is private pay [facility] that comes daily to assist pt with ADL's.   Pt wears oxygen 24/7.  Pt has used [facility] in the past.  Pt was discharged to [facility] on 6/12 for Pulmonary rehab. Will need to follow PT/OT eval for dc needs. Referral made to SW to follow. ROLANDA initiated.            Electronically signed by Ra Seals RN on 8/21/18 at 10:50 AM

## 2018-08-22 DIAGNOSIS — E21.3 HYPERPARATHYROIDISM (HCC): ICD-10-CM

## 2018-08-22 PROBLEM — R65.10 SIRS (SYSTEMIC INFLAMMATORY RESPONSE SYNDROME) (HCC): Status: ACTIVE | Noted: 2018-08-20

## 2018-08-22 LAB
ABSOLUTE EOS #: 0.1 K/UL (ref 0–0.4)
ABSOLUTE IMMATURE GRANULOCYTE: ABNORMAL K/UL (ref 0–0.3)
ABSOLUTE LYMPH #: 1.1 K/UL (ref 1–4.8)
ABSOLUTE MONO #: 0.8 K/UL (ref 0.2–0.8)
ANCA MYELOPEROXIDASE: 10 AU/ML
ANCA PROTEINASE 3: 7 AU/ML
ANION GAP SERPL CALCULATED.3IONS-SCNC: 10 MMOL/L (ref 9–17)
ANTI-NUCLEAR ANTIBODY (ANA): NEGATIVE
BASOPHILS # BLD: 0 % (ref 0–2)
BASOPHILS ABSOLUTE: 0 K/UL (ref 0–0.2)
BUN BLDV-MCNC: 44 MG/DL (ref 8–23)
BUN/CREAT BLD: 10 (ref 9–20)
CALCIUM IONIZED: 1.3 MMOL/L (ref 1.13–1.33)
CALCIUM SERPL-MCNC: 8.9 MG/DL (ref 8.6–10.4)
CHLORIDE BLD-SCNC: 103 MMOL/L (ref 98–107)
CO2: 25 MMOL/L (ref 20–31)
COMPLEMENT C3: 70 MG/DL (ref 90–180)
COMPLEMENT C4: 22 MG/DL (ref 10–40)
CREAT SERPL-MCNC: 4.33 MG/DL (ref 0.5–0.9)
CREATININE URINE: 78.1 MG/DL (ref 28–217)
DIFFERENTIAL TYPE: ABNORMAL
EOSINOPHIL,URINE: NORMAL
EOSINOPHILS RELATIVE PERCENT: 1 % (ref 1–4)
GFR AFRICAN AMERICAN: 12 ML/MIN
GFR NON-AFRICAN AMERICAN: 10 ML/MIN
GFR SERPL CREATININE-BSD FRML MDRD: ABNORMAL ML/MIN/{1.73_M2}
GFR SERPL CREATININE-BSD FRML MDRD: ABNORMAL ML/MIN/{1.73_M2}
GLUCOSE BLD-MCNC: 102 MG/DL (ref 65–105)
GLUCOSE BLD-MCNC: 105 MG/DL (ref 65–105)
GLUCOSE BLD-MCNC: 106 MG/DL (ref 70–99)
GLUCOSE BLD-MCNC: 78 MG/DL (ref 65–105)
HCT VFR BLD CALC: 26.9 % (ref 36–46)
HEMOGLOBIN: 8.7 G/DL (ref 12–16)
IMMATURE GRANULOCYTES: ABNORMAL %
LYMPHOCYTES # BLD: 15 % (ref 24–44)
MAGNESIUM: 2.3 MG/DL (ref 1.6–2.6)
MCH RBC QN AUTO: 33.5 PG (ref 26–34)
MCHC RBC AUTO-ENTMCNC: 32.2 G/DL (ref 31–37)
MCV RBC AUTO: 104.1 FL (ref 80–100)
MONOCYTES # BLD: 11 % (ref 1–7)
NRBC AUTOMATED: ABNORMAL PER 100 WBC
PDW BLD-RTO: 15.1 % (ref 11.5–14.5)
PLATELET # BLD: 97 K/UL (ref 130–400)
PLATELET ESTIMATE: ABNORMAL
PMV BLD AUTO: 8.1 FL (ref 6–12)
POTASSIUM SERPL-SCNC: 4.3 MMOL/L (ref 3.7–5.3)
RBC # BLD: 2.59 M/UL (ref 4–5.2)
RBC # BLD: ABNORMAL 10*6/UL
SEG NEUTROPHILS: 73 % (ref 36–66)
SEGMENTED NEUTROPHILS ABSOLUTE COUNT: 5.3 K/UL (ref 1.8–7.7)
SODIUM BLD-SCNC: 138 MMOL/L (ref 135–144)
SODIUM,UR: 71 MMOL/L
TOTAL PROTEIN, URINE: 49 MG/DL
WBC # BLD: 7.3 K/UL (ref 3.5–11)
WBC # BLD: ABNORMAL 10*3/UL

## 2018-08-22 PROCEDURE — 2700000000 HC OXYGEN THERAPY PER DAY

## 2018-08-22 PROCEDURE — 6360000002 HC RX W HCPCS: Performed by: INTERNAL MEDICINE

## 2018-08-22 PROCEDURE — 84166 PROTEIN E-PHORESIS/URINE/CSF: CPT

## 2018-08-22 PROCEDURE — 80048 BASIC METABOLIC PNL TOTAL CA: CPT

## 2018-08-22 PROCEDURE — 2580000003 HC RX 258: Performed by: RADIOLOGY

## 2018-08-22 PROCEDURE — 83735 ASSAY OF MAGNESIUM: CPT

## 2018-08-22 PROCEDURE — 2580000003 HC RX 258: Performed by: NURSE PRACTITIONER

## 2018-08-22 PROCEDURE — 6370000000 HC RX 637 (ALT 250 FOR IP): Performed by: NURSE PRACTITIONER

## 2018-08-22 PROCEDURE — 2000000000 HC ICU R&B

## 2018-08-22 PROCEDURE — 86335 IMMUNFIX E-PHORSIS/URINE/CSF: CPT

## 2018-08-22 PROCEDURE — 6370000000 HC RX 637 (ALT 250 FOR IP): Performed by: INTERNAL MEDICINE

## 2018-08-22 PROCEDURE — 87205 SMEAR GRAM STAIN: CPT

## 2018-08-22 PROCEDURE — 94761 N-INVAS EAR/PLS OXIMETRY MLT: CPT

## 2018-08-22 PROCEDURE — 82570 ASSAY OF URINE CREATININE: CPT

## 2018-08-22 PROCEDURE — 6360000002 HC RX W HCPCS: Performed by: NURSE PRACTITIONER

## 2018-08-22 PROCEDURE — 82330 ASSAY OF CALCIUM: CPT

## 2018-08-22 PROCEDURE — 94640 AIRWAY INHALATION TREATMENT: CPT

## 2018-08-22 PROCEDURE — 86160 COMPLEMENT ANTIGEN: CPT

## 2018-08-22 PROCEDURE — 84156 ASSAY OF PROTEIN URINE: CPT

## 2018-08-22 PROCEDURE — 2500000003 HC RX 250 WO HCPCS: Performed by: NURSE PRACTITIONER

## 2018-08-22 PROCEDURE — 99232 SBSQ HOSP IP/OBS MODERATE 35: CPT | Performed by: INTERNAL MEDICINE

## 2018-08-22 PROCEDURE — 36415 COLL VENOUS BLD VENIPUNCTURE: CPT

## 2018-08-22 PROCEDURE — 85025 COMPLETE CBC W/AUTO DIFF WBC: CPT

## 2018-08-22 PROCEDURE — 82947 ASSAY GLUCOSE BLOOD QUANT: CPT

## 2018-08-22 PROCEDURE — 84300 ASSAY OF URINE SODIUM: CPT

## 2018-08-22 RX ORDER — CALCITRIOL 0.25 UG/1
CAPSULE, LIQUID FILLED ORAL
Qty: 270 CAPSULE | Refills: 2 | Status: ON HOLD | OUTPATIENT
Start: 2018-08-22 | End: 2019-06-07 | Stop reason: HOSPADM

## 2018-08-22 RX ORDER — ACETAMINOPHEN 325 MG/1
650 TABLET ORAL EVERY 4 HOURS PRN
Status: DISCONTINUED | OUTPATIENT
Start: 2018-08-22 | End: 2018-09-07 | Stop reason: HOSPADM

## 2018-08-22 RX ORDER — FUROSEMIDE 20 MG/1
40 TABLET ORAL DAILY
Status: ON HOLD | COMMUNITY
End: 2018-09-07 | Stop reason: HOSPADM

## 2018-08-22 RX ADMIN — MAGNESIUM OXIDE TAB 400 MG (241.3 MG ELEMENTAL MG) 400 MG: 400 (241.3 MG) TAB at 08:31

## 2018-08-22 RX ADMIN — PANTOPRAZOLE SODIUM 40 MG: 40 TABLET, DELAYED RELEASE ORAL at 05:34

## 2018-08-22 RX ADMIN — METOPROLOL TARTRATE 12.5 MG: 25 TABLET ORAL at 21:05

## 2018-08-22 RX ADMIN — CARBIDOPA AND LEVODOPA 1 TABLET: 25; 100 TABLET, EXTENDED RELEASE ORAL at 13:22

## 2018-08-22 RX ADMIN — CARBIDOPA AND LEVODOPA 1 TABLET: 25; 100 TABLET, EXTENDED RELEASE ORAL at 08:31

## 2018-08-22 RX ADMIN — FERROUS SULFATE TAB EC 325 MG (65 MG FE EQUIVALENT) 325 MG: 325 (65 FE) TABLET DELAYED RESPONSE at 08:31

## 2018-08-22 RX ADMIN — CARBIDOPA AND LEVODOPA 1 TABLET: 25; 100 TABLET, EXTENDED RELEASE ORAL at 16:21

## 2018-08-22 RX ADMIN — Medication 2 PUFF: at 09:12

## 2018-08-22 RX ADMIN — ALBUTEROL SULFATE 2.5 MG: 2.5 SOLUTION RESPIRATORY (INHALATION) at 23:30

## 2018-08-22 RX ADMIN — HYDROCODONE BITARTRATE AND ACETAMINOPHEN 1 TABLET: 5; 325 TABLET ORAL at 19:11

## 2018-08-22 RX ADMIN — ANTACID TABLETS 250 MG: 500 TABLET, CHEWABLE ORAL at 13:21

## 2018-08-22 RX ADMIN — PANTOPRAZOLE SODIUM 40 MG: 40 TABLET, DELAYED RELEASE ORAL at 16:21

## 2018-08-22 RX ADMIN — ANTACID TABLETS 250 MG: 500 TABLET, CHEWABLE ORAL at 08:31

## 2018-08-22 RX ADMIN — AMIODARONE HYDROCHLORIDE 200 MG: 200 TABLET ORAL at 08:30

## 2018-08-22 RX ADMIN — NOREPINEPHRINE BITARTRATE 2 MCG/MIN: 1 INJECTION INTRAVENOUS at 01:40

## 2018-08-22 RX ADMIN — ENOXAPARIN SODIUM 30 MG: 100 INJECTION SUBCUTANEOUS at 08:31

## 2018-08-22 RX ADMIN — CHOLECALCIFEROL CAP 125 MCG (5000 UNIT) 5000 UNITS: 125 CAP at 08:39

## 2018-08-22 RX ADMIN — Medication 10 ML: at 08:32

## 2018-08-22 RX ADMIN — ROPINIROLE HYDROCHLORIDE 2 MG: 2 TABLET, FILM COATED ORAL at 13:22

## 2018-08-22 RX ADMIN — ASPIRIN 81 MG: 81 TABLET, COATED ORAL at 08:31

## 2018-08-22 RX ADMIN — MICONAZOLE NITRATE: 20.6 POWDER TOPICAL at 08:33

## 2018-08-22 RX ADMIN — IPRATROPIUM BROMIDE AND ALBUTEROL SULFATE 1 AMPULE: .5; 3 SOLUTION RESPIRATORY (INHALATION) at 09:12

## 2018-08-22 RX ADMIN — ANTACID TABLETS 250 MG: 500 TABLET, CHEWABLE ORAL at 21:05

## 2018-08-22 RX ADMIN — ROPINIROLE HYDROCHLORIDE 2 MG: 2 TABLET, FILM COATED ORAL at 21:04

## 2018-08-22 RX ADMIN — AZTREONAM 500 MG: 1 INJECTION, POWDER, LYOPHILIZED, FOR SOLUTION INTRAMUSCULAR; INTRAVENOUS at 05:34

## 2018-08-22 RX ADMIN — CALCITRIOL 0.25 MCG: 0.25 CAPSULE ORAL at 21:04

## 2018-08-22 RX ADMIN — FERROUS SULFATE TAB EC 325 MG (65 MG FE EQUIVALENT) 325 MG: 325 (65 FE) TABLET DELAYED RESPONSE at 16:21

## 2018-08-22 RX ADMIN — METOPROLOL TARTRATE 12.5 MG: 25 TABLET ORAL at 08:30

## 2018-08-22 RX ADMIN — CALCITRIOL 0.25 MCG: 0.25 CAPSULE ORAL at 08:31

## 2018-08-22 RX ADMIN — IPRATROPIUM BROMIDE AND ALBUTEROL SULFATE 1 AMPULE: .5; 3 SOLUTION RESPIRATORY (INHALATION) at 14:47

## 2018-08-22 RX ADMIN — ROPINIROLE HYDROCHLORIDE 2 MG: 2 TABLET, FILM COATED ORAL at 08:39

## 2018-08-22 RX ADMIN — MICONAZOLE NITRATE: 20.6 POWDER TOPICAL at 21:13

## 2018-08-22 RX ADMIN — AZTREONAM 500 MG: 1 INJECTION, POWDER, LYOPHILIZED, FOR SOLUTION INTRAMUSCULAR; INTRAVENOUS at 13:22

## 2018-08-22 RX ADMIN — RASAGILINE 1 MG: 1 TABLET ORAL at 08:32

## 2018-08-22 RX ADMIN — LINAGLIPTIN 5 MG: 5 TABLET, FILM COATED ORAL at 08:31

## 2018-08-22 RX ADMIN — ATORVASTATIN CALCIUM 20 MG: 20 TABLET, FILM COATED ORAL at 21:04

## 2018-08-22 RX ADMIN — CARBIDOPA AND LEVODOPA 1 TABLET: 25; 100 TABLET, EXTENDED RELEASE ORAL at 21:04

## 2018-08-22 RX ADMIN — CALCITRIOL 0.25 MCG: 0.25 CAPSULE ORAL at 13:21

## 2018-08-22 RX ADMIN — HYDROCODONE BITARTRATE AND ACETAMINOPHEN 1 TABLET: 5; 325 TABLET ORAL at 05:34

## 2018-08-22 ASSESSMENT — PAIN SCALES - GENERAL
PAINLEVEL_OUTOF10: 10
PAINLEVEL_OUTOF10: 10
PAINLEVEL_OUTOF10: 0

## 2018-08-22 ASSESSMENT — PAIN DESCRIPTION - PAIN TYPE: TYPE: ACUTE PAIN

## 2018-08-22 ASSESSMENT — PAIN DESCRIPTION - LOCATION: LOCATION: HEAD

## 2018-08-22 NOTE — PROGRESS NOTES
clubbing or edema     CURRENT MEDICATIONS        0.9 % sodium chloride infusion Continuous   ALPRAZolam (XANAX) tablet 0.25 mg TID PRN   amiodarone (CORDARONE) tablet 200 mg Daily   aspirin EC tablet 81 mg Daily   atorvastatin (LIPITOR) tablet 20 mg Nightly   mometasone-formoterol (DULERA) 200-5 MCG/ACT inhaler 2 puff BID   calcitRIOL (ROCALTROL) capsule 0.25 mcg TID   carbidopa-levodopa (SINEMET CR)  MG per extended release tablet 1 tablet 4x Daily   conjugated estrogens (PREMARIN) vaginal cream 0.5 g Every Other Day   ferrous sulfate EC tablet 325 mg BID WC   HYDROcodone-acetaminophen (NORCO) 5-325 MG per tablet 1 tablet Q4H PRN   metoprolol tartrate (LOPRESSOR) tablet 12.5 mg BID   pantoprazole (PROTONIX) tablet 40 mg BID AC   polyethylene glycol (GLYCOLAX) packet 17 g Daily PRN   rasagiline mesylate TABS 1 mg Daily   rOPINIRole (REQUIP) tablet 2 mg TID   senna (SENOKOT) tablet 17.2 mg Daily   vitamin D (CHOLECALCIFEROL) capsule 5,000 Units Daily   sodium chloride flush 0.9 % injection 10 mL 2 times per day   sodium chloride flush 0.9 % injection 10 mL PRN   enoxaparin (LOVENOX) injection 30 mg Daily   glucose (GLUTOSE) 40 % oral gel 15 g PRN   dextrose 50 % solution 12.5 g PRN   glucagon (rDNA) injection 1 mg PRN   dextrose 5 % solution PRN   insulin lispro (HUMALOG) injection vial 0-12 Units TID    insulin lispro (HUMALOG) injection vial 0-6 Units Nightly   norepinephrine (LEVOPHED) 16 mg in dextrose 5 % 250 mL infusion Continuous   linagliptin (TRADJENTA) tablet 5 mg Daily   ondansetron (ZOFRAN-ODT) disintegrating tablet 4 mg Q6H PRN   Or    ondansetron (ZOFRAN) injection 4 mg Q6H PRN   sodium chloride flush 0.9 % injection 10 mL 2 times per day   sodium chloride flush 0.9 % injection 10 mL PRN   albuterol (PROVENTIL) nebulizer solution 2.5 mg As Directed RT PRN   miconazole (MICOTIN) 2 % powder BID   ipratropium-albuterol (DUONEB) nebulizer solution 1 ampule TID   promethazine (PHENERGAN) injection

## 2018-08-22 NOTE — PROGRESS NOTES
[facility]    Progress Note    8/22/2018    7:35 AM    Name:   Yamilet Fitzpatrick  MRN:     [id number]     Acct:      [de-identified]   Room:   [address] [place] [address] Day:  2  Admit Date:  8/20/2018  1:21 AM    PCP:   Sue Zuniga DO  Code Status:  Full Code    Subjective:     C/C:   Chief Complaint   Patient presents with    Emesis    Nausea     Interval History Status: not changed. Patient with continued acute renal failure, nausea and vomiting have improved. Denies any chest pain, shortness of breath, fever or chills    Brief History: This is 72-year-old white female who is noted with a complaint of nausea, vomiting and diarrhea and found have acute kidney injury and hypotension. She is admitted into the intensive care unit and placed on IV fluids and Levophed drip. Nephrology consultation is requested and workup is in progress. She has recently been on Bactrim as an outpatient for urinary tract infection. Review of Systems:     Constitutional:  negative for chills, fevers, sweats  Respiratory:  negative for cough, dyspnea on exertion, shortness of breath, wheezing  Cardiovascular:  negative for chest pain, chest pressure/discomfort, lower extremity edema, palpitations  Gastrointestinal:  negative for abdominal pain, constipation, diarrhea, nausea, vomiting  Neurological:  negative for dizziness, headache    Medications: Allergies: Allergies   Allergen Reactions    Dye [Barium-Containing Compounds] Other (See Comments)     Cause Afib per     Pcn [Penicillins] Itching and Swelling    Red Dye Itching and Rash     This allergy is likely incorrect:  Per patient's  she has no issue with medications that have red dye in them or red food.  He thinks this reaction was added to chart when the pt had a colonoscopy (possibly barium or iodine dye instead)       Current Meds:   Scheduled Meds:    amiodarone  200 mg Oral Daily    aspirin affect  Lungs:  clear to auscultation bilaterally, normal effort  Heart:  regular rate and rhythm, no murmur  Abdomen:  soft, nontender, nondistended, normal bowel sounds, no masses, hepatomegaly, splenomegaly  Extremities:  no edema, redness, tenderness in the calves  Skin:  no gross lesions, rashes, induration    Assessment:        Primary Problem  Acute kidney injury superimposed on chronic kidney disease [facility])    Active Hospital Problems    Diagnosis Date Noted    Sepsis ([facility] [A41.9] 08/20/2018    Nausea and vomiting in adult [R11.2] 08/20/2018    Acute kidney injury superimposed on chronic kidney disease ([facility] [N17.9, N18.9] 08/20/2018    Bacterial UTI [N39.0, A49.9] 08/20/2018    Dehydration [E86.0]     Anemia of chronic renal failure, stage 4 (severe) ([facility] [N18.4, D63.1] 04/25/2018    CKD stage 4 due to type 2 diabetes mellitus ([facility] [U24.64, N18.4] 03/08/2018    ECTOR on CPAP [G47.33, Z99.89]     Chronic atrial fibrillation ([facility] [I48.2] 05/31/2013    Essential hypertension [I10] 06/07/2012    Controlled type 2 diabetes mellitus with stage 3 chronic kidney disease, without long-term current use of insulin (HCC) [E11.22, N18.3]        Plan:        1. IV fluids per nephrology  2. Antiemetics as needed  3. Urine culture negative, monitor off antibiotics  4. CPAP at nighttime  5. Blood pressure and blood sugar control, continue present medications  6. Avoid nephrotoxic agents  7. Follow labs  8.  PT and OT  9. GI and DVT prophylaxis    Bianca Polo DO  8/22/2018  7:35 AM

## 2018-08-22 NOTE — PLAN OF CARE
Problem: Risk for Impaired Skin Integrity  Goal: Tissue integrity - skin and mucous membranes  Structural intactness and normal physiological function of skin and  mucous membranes.    Outcome: Ongoing      Problem: Falls - Risk of:  Goal: Will remain free from falls  Will remain free from falls   Outcome: Ongoing      Problem: Pain:  Goal: Pain level will decrease  Pain level will decrease   Outcome: Ongoing

## 2018-08-22 NOTE — PLAN OF CARE
Problem: Risk for Impaired Skin Integrity  Goal: Tissue integrity - skin and mucous membranes  Structural intactness and normal physiological function of skin and  mucous membranes. Outcome: Ongoing  Skin assessment performed. Patient turns self PRN. Pressure ulcer prevention being practiced. Problem: Falls - Risk of:  Goal: Will remain free from falls  Will remain free from falls   Outcome: Ongoing  Pt assessed as a fall risk this shift. Remains free from falls and accidental injury at this time. Fall precautions in place, including falling star sign and fall risk band on pt. Floor free from obstacles, and bed is locked and in lowest position. Adequate lighting provided. Pt encouraged to call before getting OOB for any need. Will continue to monitor needs during hourly rounding, and reinforce education on use of call light. Problem: Pain:  Goal: Control of acute pain  Control of acute pain   Outcome: Ongoing  Pt medicated with pain medication prn. Assessed all pain characteristics including level, type, location, frequency, and onset. Non-pharmacologic interventions offered to pt as well. Pt states pain is tolerable at this time.  Will continue to monitor

## 2018-08-22 NOTE — PROGRESS NOTES
[facility] Sepsis ([facility] [A41.9] . Vitals:    08/22/18 1448   BP:    Pulse:    Resp: 20   Temp:    SpO2: 98%   . Patients home meds are   Prior to Admission medications    Medication Sig Start Date End Date Taking? Authorizing Provider   SITagliptin-metFORMIN (JANUMET XR)  MG TB24 per extended release tablet Take 2 tablets by mouth daily   Yes Historical Provider, MD   sulfamethoxazole-trimethoprim (BACTRIM DS) 800-160 MG per tablet Take 1 tablet by mouth 2 times daily for 10 days 8/13/18 8/23/18 Yes Lesly Hill DO   atorvastatin (LIPITOR) 20 MG tablet TAKE 1 TABLET DAILY 7/20/18  Yes Lesly Hill DO   fluconazole (DIFLUCAN) 150 MG tablet Take 1 tablet by mouth daily 7/17/18  Yes Lesly Hill DO   spironolactone (ALDACTONE) 50 MG tablet Take 1 tablet by mouth daily 7/17/18  Yes Lesly Hill DO   ALPRAZolam (XANAX) 0.25 MG tablet Take 1 tablet by mouth 3 times daily as needed for Anxiety for up to 90 days. . 7/6/18 10/4/18 Yes Lesly Hill DO   butalbital-acetaminophen-caffeine (FIORICET, ESGIC) -40 MG per tablet Take 1 tablet by mouth every 4 hours as needed for Headaches 6/7/18  Yes Jose Perez DO   nystatin (MYCOSTATIN) 258114 UNIT/GM cream Apply topically 2 times daily.  6/7/18  Yes Jose HO Blood DO   furosemide (LASIX) 20 MG tablet Take 1 tablet by mouth daily  Patient taking differently: Take 40 mg by mouth daily  6/8/18  Yes Jose HO Blood DO   carbidopa-levodopa (SINEMET CR)  MG per extended release tablet Take 1 tablet by mouth 4 times daily   Yes Historical Provider, MD   calcium citrate (CALCITRATE) 250 MG TABS tablet Take 250 mg by mouth 3 times daily   Yes Historical Provider, MD   magnesium oxide (MAG-OX) 400 MG tablet Take 400 mg by mouth daily   Yes Historical Provider, MD   polyethylene glycol (GLYCOLAX) packet Take 17 g by mouth daily as needed for Constipation   Yes Historical Provider, MD   senna (SENOKOT) 8.6 MG tablet Take 2 tablets by mouth daily   Yes Historical Provider, MD   conjugated estrogens (PREMARIN) 0.625 MG/GM vaginal cream Place 0.5 g vaginally every other day 4/4/18  Yes Yecenia Tam MD   metoprolol tartrate (LOPRESSOR) 25 MG tablet Take 0.5 tablets by mouth 2 times daily 3/10/18  Yes Janak Marrero MD   aspirin 81 MG tablet Take 81 mg by mouth daily LAST DOSE 03/31/2018   Yes Historical Provider, MD   rOPINIRole (REQUIP) 2 MG tablet Take 2 mg by mouth 3 times daily   Yes Historical Provider, MD   pantoprazole (PROTONIX) 40 MG tablet Take 1 tablet by mouth 2 times daily (before meals) 1/22/18  Yes Lesly Hlil DO   BREO ELLIPTA 100-25 MCG/INH AEPB inhaler Inhale 1 puff into the lungs daily 1/22/18  Yes Lesly Hill DO   ferrous sulfate 325 (65 Fe) MG EC tablet Take 1 tablet by mouth 2 times daily (with meals) 12/21/17  Yes Jose HO Blood, DO   vitamin D (CHOLECALCIFEROL) 5000 units CAPS capsule Take 5,000 Units by mouth daily   Yes Historical Provider, MD   rasagiline mesylate 1 MG TABS Take 1 tablet by mouth daily   Yes Historical Provider, MD   amiodarone (CORDARONE) 200 MG tablet Take 1 tablet by mouth daily 8/27/17  Yes Donna HO Blood, DO   albuterol (PROVENTIL) (2.5 MG/3ML) 0.083% nebulizer solution Take 3 mLs by nebulization every 6 hours as needed for Wheezing 7/18/17  Yes Lesly Hill DO   Oxygen Concentrator Dx: Pneumonia, use as directed. Patient taking differently: Dx: Pneumonia, use as directed.    ON 24/7 2/10/17  Yes Lesly Hill DO   calcitRIOL (ROCALTROL) 0.25 MCG capsule TAKE 1 CAPSULE THREE TIMES A DAY 8/22/18   Lesly Hill DO   ondansetron (ZOFRAN) 8 MG tablet TK 1 T PO Q 8 H PRF NAUSEA OR VOM 5/17/18   Historical Provider, MD   HYDROcodone-acetaminophen (Nyla Jose) 5-325 MG per tablet TK 1 T PO BID PRF PAIN 7/16/18   Historical Provider, MD   .  Recent Surgical History: None = 0     Assessment: home meds, home cpap unit     Peak Flow (asthma only)    Predicted:   Personal []  Moderate amount of viscous secretions []  [place], Viscious Yellow/ Secretions   CXR as reported by physician []  clear  []  Unavailable []  Infiltrates and/or consolidation  []  Unavailable []  Mucus Plugging and or lobar consolidation  []  Unavailable   Cough []  Strong, productive cough []  Weak productive cough []  No cough or weak non-productive cough

## 2018-08-23 ENCOUNTER — APPOINTMENT (OUTPATIENT)
Dept: GENERAL RADIOLOGY | Age: 73
DRG: 871 | End: 2018-08-23
Payer: COMMERCIAL

## 2018-08-23 PROBLEM — J96.01 ACUTE HYPOXEMIC RESPIRATORY FAILURE (HCC): Status: ACTIVE | Noted: 2018-08-23

## 2018-08-23 LAB
-: ABNORMAL
ABSOLUTE EOS #: 0 K/UL (ref 0–0.4)
ABSOLUTE IMMATURE GRANULOCYTE: ABNORMAL K/UL (ref 0–0.3)
ABSOLUTE LYMPH #: 1.22 K/UL (ref 1–4.8)
ABSOLUTE MONO #: 0 K/UL (ref 0.2–0.8)
AMORPHOUS: ABNORMAL
ANION GAP SERPL CALCULATED.3IONS-SCNC: 12 MMOL/L (ref 9–17)
BACTERIA: ABNORMAL
BASOPHILS # BLD: 0 %
BASOPHILS ABSOLUTE: 0 K/UL (ref 0–0.2)
BILIRUBIN URINE: NEGATIVE
BUN BLDV-MCNC: 40 MG/DL (ref 8–23)
BUN/CREAT BLD: 11 (ref 9–20)
CALCIUM IONIZED: 1.36 MMOL/L (ref 1.13–1.33)
CALCIUM SERPL-MCNC: 9.4 MG/DL (ref 8.6–10.4)
CASTS UA: ABNORMAL /LPF
CHLORIDE BLD-SCNC: 104 MMOL/L (ref 98–107)
CO2: 24 MMOL/L (ref 20–31)
COLOR: YELLOW
COMMENT UA: ABNORMAL
CREAT SERPL-MCNC: 3.71 MG/DL (ref 0.5–0.9)
CRYSTALS, UA: ABNORMAL /HPF
DIFFERENTIAL TYPE: ABNORMAL
EOSINOPHILS RELATIVE PERCENT: 0 % (ref 1–4)
EPITHELIAL CELLS UA: ABNORMAL /HPF (ref 0–5)
GFR AFRICAN AMERICAN: 15 ML/MIN
GFR NON-AFRICAN AMERICAN: 12 ML/MIN
GFR SERPL CREATININE-BSD FRML MDRD: ABNORMAL ML/MIN/{1.73_M2}
GFR SERPL CREATININE-BSD FRML MDRD: ABNORMAL ML/MIN/{1.73_M2}
GLUCOSE BLD-MCNC: 111 MG/DL (ref 65–105)
GLUCOSE BLD-MCNC: 116 MG/DL (ref 70–99)
GLUCOSE BLD-MCNC: 161 MG/DL (ref 65–105)
GLUCOSE BLD-MCNC: 96 MG/DL (ref 65–105)
GLUCOSE BLD-MCNC: 99 MG/DL (ref 65–105)
GLUCOSE URINE: NEGATIVE
HCT VFR BLD CALC: 27.5 % (ref 36–46)
HCT VFR BLD CALC: 31.1 % (ref 36–46)
HEMOGLOBIN: 9.1 G/DL (ref 12–16)
HEMOGLOBIN: 9.9 G/DL (ref 12–16)
IMMATURE GRANULOCYTES: ABNORMAL %
KETONES, URINE: NEGATIVE
LEUKOCYTE ESTERASE, URINE: ABNORMAL
LYMPHOCYTES # BLD: 9 % (ref 24–44)
MAGNESIUM: 2.2 MG/DL (ref 1.6–2.6)
MCH RBC QN AUTO: 33.3 PG (ref 26–34)
MCH RBC QN AUTO: 34.4 PG (ref 26–34)
MCHC RBC AUTO-ENTMCNC: 31.9 G/DL (ref 31–37)
MCHC RBC AUTO-ENTMCNC: 32.9 G/DL (ref 31–37)
MCV RBC AUTO: 104.3 FL (ref 80–100)
MCV RBC AUTO: 104.4 FL (ref 80–100)
MONOCYTES # BLD: 0 % (ref 1–7)
MORPHOLOGY: ABNORMAL
MUCUS: ABNORMAL
NITRITE, URINE: NEGATIVE
NRBC AUTOMATED: ABNORMAL PER 100 WBC
NRBC AUTOMATED: ABNORMAL PER 100 WBC
OTHER OBSERVATIONS UA: ABNORMAL
PARTIAL THROMBOPLASTIN TIME: 113.2 SEC (ref 23–31)
PDW BLD-RTO: 14.9 % (ref 11.5–14.5)
PDW BLD-RTO: 15.1 % (ref 11.5–14.5)
PH UA: 5.5 (ref 5–8)
PLATELET # BLD: 104 K/UL (ref 130–400)
PLATELET # BLD: 117 K/UL (ref 130–400)
PLATELET ESTIMATE: ABNORMAL
PMV BLD AUTO: 8.5 FL (ref 6–12)
PMV BLD AUTO: ABNORMAL FL (ref 6–12)
POTASSIUM SERPL-SCNC: 4.8 MMOL/L (ref 3.7–5.3)
PROTEIN UA: ABNORMAL
RBC # BLD: 2.63 M/UL (ref 4–5.2)
RBC # BLD: 2.98 M/UL (ref 4–5.2)
RBC # BLD: ABNORMAL 10*6/UL
RBC UA: ABNORMAL /HPF (ref 0–2)
RENAL EPITHELIAL, UA: ABNORMAL /HPF
SEG NEUTROPHILS: 91 % (ref 36–66)
SEGMENTED NEUTROPHILS ABSOLUTE COUNT: 12.38 K/UL (ref 1.8–7.7)
SODIUM BLD-SCNC: 140 MMOL/L (ref 135–144)
SPECIFIC GRAVITY UA: 1.02 (ref 1–1.03)
TRICHOMONAS: ABNORMAL
TROPONIN INTERP: NORMAL
TROPONIN T: <0.03 NG/ML
TURBIDITY: CLEAR
URINE HGB: ABNORMAL
UROBILINOGEN, URINE: NORMAL
WBC # BLD: 10.7 K/UL (ref 3.5–11)
WBC # BLD: 13.6 K/UL (ref 3.5–11)
WBC # BLD: ABNORMAL 10*3/UL
WBC UA: ABNORMAL /HPF (ref 0–5)
YEAST: ABNORMAL

## 2018-08-23 PROCEDURE — 80048 BASIC METABOLIC PNL TOTAL CA: CPT

## 2018-08-23 PROCEDURE — 6360000002 HC RX W HCPCS: Performed by: INTERNAL MEDICINE

## 2018-08-23 PROCEDURE — 94640 AIRWAY INHALATION TREATMENT: CPT

## 2018-08-23 PROCEDURE — 2700000000 HC OXYGEN THERAPY PER DAY

## 2018-08-23 PROCEDURE — 2580000003 HC RX 258: Performed by: RADIOLOGY

## 2018-08-23 PROCEDURE — 6370000000 HC RX 637 (ALT 250 FOR IP): Performed by: NURSE PRACTITIONER

## 2018-08-23 PROCEDURE — 82947 ASSAY GLUCOSE BLOOD QUANT: CPT

## 2018-08-23 PROCEDURE — 2500000003 HC RX 250 WO HCPCS: Performed by: INTERNAL MEDICINE

## 2018-08-23 PROCEDURE — 2500000003 HC RX 250 WO HCPCS: Performed by: NURSE PRACTITIONER

## 2018-08-23 PROCEDURE — 6370000000 HC RX 637 (ALT 250 FOR IP): Performed by: INTERNAL MEDICINE

## 2018-08-23 PROCEDURE — 99233 SBSQ HOSP IP/OBS HIGH 50: CPT | Performed by: INTERNAL MEDICINE

## 2018-08-23 PROCEDURE — 94660 CPAP INITIATION&MGMT: CPT

## 2018-08-23 PROCEDURE — 36415 COLL VENOUS BLD VENIPUNCTURE: CPT

## 2018-08-23 PROCEDURE — 85025 COMPLETE CBC W/AUTO DIFF WBC: CPT

## 2018-08-23 PROCEDURE — 71045 X-RAY EXAM CHEST 1 VIEW: CPT

## 2018-08-23 PROCEDURE — 82330 ASSAY OF CALCIUM: CPT

## 2018-08-23 PROCEDURE — 85730 THROMBOPLASTIN TIME PARTIAL: CPT

## 2018-08-23 PROCEDURE — 81001 URINALYSIS AUTO W/SCOPE: CPT

## 2018-08-23 PROCEDURE — 6360000002 HC RX W HCPCS: Performed by: NURSE PRACTITIONER

## 2018-08-23 PROCEDURE — 2580000003 HC RX 258: Performed by: INTERNAL MEDICINE

## 2018-08-23 PROCEDURE — 84484 ASSAY OF TROPONIN QUANT: CPT

## 2018-08-23 PROCEDURE — 94761 N-INVAS EAR/PLS OXIMETRY MLT: CPT

## 2018-08-23 PROCEDURE — 85027 COMPLETE CBC AUTOMATED: CPT

## 2018-08-23 PROCEDURE — 83735 ASSAY OF MAGNESIUM: CPT

## 2018-08-23 PROCEDURE — 2580000003 HC RX 258: Performed by: NURSE PRACTITIONER

## 2018-08-23 PROCEDURE — 2000000000 HC ICU R&B

## 2018-08-23 RX ORDER — METOPROLOL TARTRATE 5 MG/5ML
5 INJECTION INTRAVENOUS EVERY 4 HOURS PRN
Status: DISCONTINUED | OUTPATIENT
Start: 2018-08-23 | End: 2018-09-07 | Stop reason: HOSPADM

## 2018-08-23 RX ORDER — CALCITRIOL 0.25 UG/1
0.25 CAPSULE, LIQUID FILLED ORAL DAILY
Status: DISCONTINUED | OUTPATIENT
Start: 2018-08-24 | End: 2018-08-24

## 2018-08-23 RX ORDER — FUROSEMIDE 10 MG/ML
40 INJECTION INTRAMUSCULAR; INTRAVENOUS ONCE
Status: COMPLETED | OUTPATIENT
Start: 2018-08-23 | End: 2018-08-23

## 2018-08-23 RX ORDER — HEPARIN SODIUM 1000 [USP'U]/ML
2000 INJECTION, SOLUTION INTRAVENOUS; SUBCUTANEOUS PRN
Status: DISCONTINUED | OUTPATIENT
Start: 2018-08-23 | End: 2018-08-24 | Stop reason: ALTCHOICE

## 2018-08-23 RX ORDER — HEPARIN SODIUM 1000 [USP'U]/ML
4000 INJECTION, SOLUTION INTRAVENOUS; SUBCUTANEOUS ONCE
Status: COMPLETED | OUTPATIENT
Start: 2018-08-23 | End: 2018-08-23

## 2018-08-23 RX ORDER — HEPARIN SODIUM 1000 [USP'U]/ML
4000 INJECTION, SOLUTION INTRAVENOUS; SUBCUTANEOUS PRN
Status: DISCONTINUED | OUTPATIENT
Start: 2018-08-23 | End: 2018-08-24 | Stop reason: ALTCHOICE

## 2018-08-23 RX ORDER — HEPARIN SODIUM 10000 [USP'U]/100ML
11.5 INJECTION, SOLUTION INTRAVENOUS CONTINUOUS
Status: DISCONTINUED | OUTPATIENT
Start: 2018-08-23 | End: 2018-08-24

## 2018-08-23 RX ADMIN — FERROUS SULFATE TAB EC 325 MG (65 MG FE EQUIVALENT) 325 MG: 325 (65 FE) TABLET DELAYED RESPONSE at 09:23

## 2018-08-23 RX ADMIN — IPRATROPIUM BROMIDE AND ALBUTEROL SULFATE 1 AMPULE: .5; 3 SOLUTION RESPIRATORY (INHALATION) at 14:44

## 2018-08-23 RX ADMIN — DILTIAZEM HYDROCHLORIDE 5 MG/HR: 5 INJECTION INTRAVENOUS at 19:38

## 2018-08-23 RX ADMIN — HEPARIN SODIUM AND DEXTROSE 11.5 UNITS/KG/HR: 10000; 5 INJECTION INTRAVENOUS at 19:38

## 2018-08-23 RX ADMIN — CALCITRIOL 0.25 MCG: 0.25 CAPSULE ORAL at 09:22

## 2018-08-23 RX ADMIN — ENOXAPARIN SODIUM 30 MG: 100 INJECTION SUBCUTANEOUS at 09:23

## 2018-08-23 RX ADMIN — ONDANSETRON HYDROCHLORIDE 4 MG: 2 INJECTION, SOLUTION INTRAMUSCULAR; INTRAVENOUS at 11:23

## 2018-08-23 RX ADMIN — ASPIRIN 81 MG: 81 TABLET, COATED ORAL at 09:23

## 2018-08-23 RX ADMIN — RASAGILINE 1 MG: 1 TABLET ORAL at 09:30

## 2018-08-23 RX ADMIN — CARBIDOPA AND LEVODOPA 1 TABLET: 25; 100 TABLET, EXTENDED RELEASE ORAL at 09:23

## 2018-08-23 RX ADMIN — ROPINIROLE HYDROCHLORIDE 2 MG: 2 TABLET, FILM COATED ORAL at 21:26

## 2018-08-23 RX ADMIN — ANTACID TABLETS 250 MG: 500 TABLET, CHEWABLE ORAL at 13:26

## 2018-08-23 RX ADMIN — PANTOPRAZOLE SODIUM 40 MG: 40 TABLET, DELAYED RELEASE ORAL at 05:57

## 2018-08-23 RX ADMIN — HYDROCODONE BITARTRATE AND ACETAMINOPHEN 1 TABLET: 5; 325 TABLET ORAL at 21:26

## 2018-08-23 RX ADMIN — CALCITRIOL 0.25 MCG: 0.25 CAPSULE ORAL at 13:26

## 2018-08-23 RX ADMIN — Medication 10 ML: at 09:00

## 2018-08-23 RX ADMIN — CHOLECALCIFEROL CAP 125 MCG (5000 UNIT) 5000 UNITS: 125 CAP at 09:22

## 2018-08-23 RX ADMIN — IPRATROPIUM BROMIDE AND ALBUTEROL SULFATE 1 AMPULE: .5; 3 SOLUTION RESPIRATORY (INHALATION) at 19:29

## 2018-08-23 RX ADMIN — METOPROLOL TARTRATE 12.5 MG: 25 TABLET ORAL at 21:26

## 2018-08-23 RX ADMIN — ANTACID TABLETS 250 MG: 500 TABLET, CHEWABLE ORAL at 09:22

## 2018-08-23 RX ADMIN — ROPINIROLE HYDROCHLORIDE 2 MG: 2 TABLET, FILM COATED ORAL at 13:26

## 2018-08-23 RX ADMIN — NOREPINEPHRINE BITARTRATE 2 MCG/MIN: 1 INJECTION INTRAVENOUS at 22:41

## 2018-08-23 RX ADMIN — LINAGLIPTIN 5 MG: 5 TABLET, FILM COATED ORAL at 09:22

## 2018-08-23 RX ADMIN — CARBIDOPA AND LEVODOPA 1 TABLET: 25; 100 TABLET, EXTENDED RELEASE ORAL at 21:26

## 2018-08-23 RX ADMIN — MICONAZOLE NITRATE: 20.6 POWDER TOPICAL at 21:32

## 2018-08-23 RX ADMIN — AMIODARONE HYDROCHLORIDE 200 MG: 200 TABLET ORAL at 09:22

## 2018-08-23 RX ADMIN — FUROSEMIDE 40 MG: 10 INJECTION, SOLUTION INTRAMUSCULAR; INTRAVENOUS at 16:17

## 2018-08-23 RX ADMIN — MICONAZOLE NITRATE: 20.6 POWDER TOPICAL at 10:00

## 2018-08-23 RX ADMIN — IPRATROPIUM BROMIDE AND ALBUTEROL SULFATE 1 AMPULE: .5; 3 SOLUTION RESPIRATORY (INHALATION) at 07:38

## 2018-08-23 RX ADMIN — ATORVASTATIN CALCIUM 20 MG: 20 TABLET, FILM COATED ORAL at 21:26

## 2018-08-23 RX ADMIN — FERROUS SULFATE TAB EC 325 MG (65 MG FE EQUIVALENT) 325 MG: 325 (65 FE) TABLET DELAYED RESPONSE at 17:17

## 2018-08-23 RX ADMIN — HYDROCODONE BITARTRATE AND ACETAMINOPHEN 1 TABLET: 5; 325 TABLET ORAL at 05:57

## 2018-08-23 RX ADMIN — CARBIDOPA AND LEVODOPA 1 TABLET: 25; 100 TABLET, EXTENDED RELEASE ORAL at 13:26

## 2018-08-23 RX ADMIN — SENNOSIDES 17.2 MG: 8.6 TABLET, FILM COATED ORAL at 09:22

## 2018-08-23 RX ADMIN — INSULIN LISPRO 2 UNITS: 100 INJECTION, SOLUTION INTRAVENOUS; SUBCUTANEOUS at 08:30

## 2018-08-23 RX ADMIN — ALPRAZOLAM 0.25 MG: 0.25 TABLET ORAL at 01:50

## 2018-08-23 RX ADMIN — CONJUGATED ESTROGENS 0.5 G: 0.62 CREAM VAGINAL at 10:00

## 2018-08-23 RX ADMIN — ROPINIROLE HYDROCHLORIDE 2 MG: 2 TABLET, FILM COATED ORAL at 09:22

## 2018-08-23 RX ADMIN — HYDROCODONE BITARTRATE AND ACETAMINOPHEN 1 TABLET: 5; 325 TABLET ORAL at 01:45

## 2018-08-23 RX ADMIN — CARBIDOPA AND LEVODOPA 1 TABLET: 25; 100 TABLET, EXTENDED RELEASE ORAL at 17:17

## 2018-08-23 RX ADMIN — PANTOPRAZOLE SODIUM 40 MG: 40 TABLET, DELAYED RELEASE ORAL at 16:00

## 2018-08-23 RX ADMIN — METOPROLOL TARTRATE 12.5 MG: 25 TABLET ORAL at 09:23

## 2018-08-23 RX ADMIN — MAGNESIUM OXIDE TAB 400 MG (241.3 MG ELEMENTAL MG) 400 MG: 400 (241.3 MG) TAB at 09:24

## 2018-08-23 RX ADMIN — HEPARIN SODIUM 4000 UNITS: 1000 INJECTION INTRAVENOUS; SUBCUTANEOUS at 19:37

## 2018-08-23 RX ADMIN — ALPRAZOLAM 0.25 MG: 0.25 TABLET ORAL at 21:26

## 2018-08-23 ASSESSMENT — PAIN SCALES - GENERAL
PAINLEVEL_OUTOF10: 10
PAINLEVEL_OUTOF10: 7
PAINLEVEL_OUTOF10: 0
PAINLEVEL_OUTOF10: 10

## 2018-08-23 ASSESSMENT — PAIN DESCRIPTION - PAIN TYPE
TYPE: ACUTE PAIN
TYPE: ACUTE PAIN

## 2018-08-23 ASSESSMENT — PAIN DESCRIPTION - LOCATION
LOCATION: HEAD
LOCATION: HEAD

## 2018-08-23 NOTE — PLAN OF CARE
Problem: Risk for Impaired Skin Integrity  Goal: Tissue integrity - skin and mucous membranes  Structural intactness and normal physiological function of skin and  mucous membranes. Outcome: Ongoing  Skin assessment performed. Patient turns self PRN. Pressure ulcer prevention being practiced. Problem: Falls - Risk of:  Goal: Will remain free from falls  Will remain free from falls   Outcome: Ongoing  Pt assessed as a fall risk this shift. Remains free from falls and accidental injury at this time. Fall precautions in place, including falling star sign and fall risk band on pt. Floor free from obstacles, and bed is locked and in lowest position. Adequate lighting provided. Pt encouraged to call before getting OOB for any needs. Will continue to monitor needs during hourly rounding, and reinforce education on use of call light. Problem: Pain:  Goal: Control of acute pain  Control of acute pain   Outcome: Ongoing  Pt medicated with pain medication prn. Assessed all pain characteristics including level, type, location, frequency, and onset. Non-pharmacologic interventions offered to pt as well.  Will continue to monitor

## 2018-08-23 NOTE — PROGRESS NOTES
Pt placed on home CPAP and began to desat to the 60s. Respiratory called and pt placed on hospital BiPap and sats back up into the 90s. Will continue to monitor.

## 2018-08-23 NOTE — PROGRESS NOTES
Occupational Therapy  DATE: 2018    NAME: Josephine Vazquez  MRN: [id number]   : 1945    Patient not seen this date for Occupational Therapy due to:  [] Blood transfusion in progress  [x] Cancel by RN due to medical status   [] Hemodialysis  []  Refusal by Patient   [] Spine Precautions   [] Strict Bedrest  [] Surgery  [] Testing      [] Other        [] PT being discontinued at this time. Patient independent. No further needs. [] PT being discontinued at this time as the patient has been transferred to hospice care. No further needs.     Maci Khan, OT

## 2018-08-23 NOTE — PROGRESS NOTES
Patient continues to be drowsy and resting with eyes closed on High -Gage. Respirations non-labored and even, does become dyspneic with minimal exertion.

## 2018-08-23 NOTE — PROGRESS NOTES
Patient's BIPAP alarming. Upon writer entering room patient's oxygen level at 76% and rapidly deteriorated to 58% with a decent wave form. BIPAP mask reapplied and patient's oxygen level increased to 95%. RT informed as well as Dr. Ayan Hart.

## 2018-08-23 NOTE — PROGRESS NOTES
Nephrology Progress Note      SUBJECTIVE       Pt was seen and examined. No acute issues overnite. Stable hemodynamics . She is currently off Levophed. Her pressures are a lot more stable today. UO better. Still has a Jacinto indwelling. CT scan of the abdomen was negative for hydronephrosis  Initial serology which we have thus far is coming back negative as well. Urine studies are indicating tubular damage  Paraprotein labs are negative  Her creatinine was 3.7 yesterday, improved  Her baseline serum creatinine is 1.2-1.3. CXR shows fluid overload  OBJECTIVE      CURRENT TEMPERATURE:  Temp: 99.1 °F (37.3 °C)  MAXIMUM TEMPERATURE OVER 24HRS:  Temp (24hrs), Av.5 °F (36.9 °C), Min:97.7 °F (36.5 °C), Max:99.1 °F (37.3 °C)    CURRENT RESPIRATORY RATE:  Resp: 25  CURRENT PULSE:  Pulse: 72  CURRENT BLOOD PRESSURE:  BP: 104/89  24HR BLOOD PRESSURE RANGE:  Systolic (98QWB), KHK:362 , Min:99 , FRANCHESCA:326   ; Diastolic (73RVY), ZQX:84, Min:40, Max:103    24HR INTAKE/OUTPUT:      Intake/Output Summary (Last 24 hours) at 18 1547  Last data filed at 18 1400   Gross per 24 hour   Intake             1520 ml   Output             1750 ml   Net             -230 ml     WEIGHT :  Patient Vitals for the past 96 hrs (Last 3 readings):   Weight   18 0730 190 lb 14.7 oz (86.6 kg)   18 0620 185 lb 9.6 oz (84.2 kg)   18 0153 185 lb (83.9 kg)     PHYSICAL EXAM      GENERAL APPEARANCE:Awake, alert, in no acute distress  SKIN: warm and dry, no rash or erythema  EYES: conjunctivae normal and sclera anicteric  ENT: no thrush no pharyngeal congestion   NECK:   No JVD. No carotid bruits and no carotid lymphadenopathy . PULMONARY: lungs are Diminished to auscultation. No Wheezing, no ronchi . CADRDIOVASCULAR: S1 and S2 normal NO S3 and NO S4 . No rubs , no murmur. ABDOMEN: soft nontender, bowel sounds present, no organomegaly, no ascites.        EXTREMITIES: no cyanosis, clubbing or edema     CURRENT MEDICATIONS        metoprolol (LOPRESSOR) injection 5 mg Q4H PRN   [START ON 8/24/2018] calcitRIOL (ROCALTROL) capsule 0.25 mcg Daily   acetaminophen (TYLENOL) tablet 650 mg Q4H PRN   ALPRAZolam (XANAX) tablet 0.25 mg TID PRN   amiodarone (CORDARONE) tablet 200 mg Daily   aspirin EC tablet 81 mg Daily   atorvastatin (LIPITOR) tablet 20 mg Nightly   mometasone-formoterol (DULERA) 200-5 MCG/ACT inhaler 2 puff BID   carbidopa-levodopa (SINEMET CR)  MG per extended release tablet 1 tablet 4x Daily   conjugated estrogens (PREMARIN) vaginal cream 0.5 g Every Other Day   ferrous sulfate EC tablet 325 mg BID WC   HYDROcodone-acetaminophen (NORCO) 5-325 MG per tablet 1 tablet Q4H PRN   metoprolol tartrate (LOPRESSOR) tablet 12.5 mg BID   pantoprazole (PROTONIX) tablet 40 mg BID AC   polyethylene glycol (GLYCOLAX) packet 17 g Daily PRN   rasagiline mesylate TABS 1 mg Daily   rOPINIRole (REQUIP) tablet 2 mg TID   senna (SENOKOT) tablet 17.2 mg Daily   vitamin D (CHOLECALCIFEROL) capsule 5,000 Units Daily   sodium chloride flush 0.9 % injection 10 mL 2 times per day   sodium chloride flush 0.9 % injection 10 mL PRN   enoxaparin (LOVENOX) injection 30 mg Daily   glucose (GLUTOSE) 40 % oral gel 15 g PRN   dextrose 50 % solution 12.5 g PRN   glucagon (rDNA) injection 1 mg PRN   dextrose 5 % solution PRN   insulin lispro (HUMALOG) injection vial 0-12 Units TID    insulin lispro (HUMALOG) injection vial 0-6 Units Nightly   norepinephrine (LEVOPHED) 16 mg in dextrose 5 % 250 mL infusion Continuous   linagliptin (TRADJENTA) tablet 5 mg Daily   ondansetron (ZOFRAN-ODT) disintegrating tablet 4 mg Q6H PRN   Or    ondansetron (ZOFRAN) injection 4 mg Q6H PRN   sodium chloride flush 0.9 % injection 10 mL 2 times per day   sodium chloride flush 0.9 % injection 10 mL PRN   albuterol (PROVENTIL) nebulizer solution 2.5 mg As Directed RT PRN   miconazole (MICOTIN) 2 % powder BID   ipratropium-albuterol (DUONEB) nebulizer solution 1 Value Date    TPU 49 08/22/2018    TPU Pending 08/22/2018     URINALYSIS:  U/A:   Lab Results   Component Value Date    NITRU NEGATIVE 08/23/2018    COLORU YELLOW 08/23/2018    PHUR 5.5 08/23/2018    WBCUA 5 TO 10 08/23/2018    RBCUA 50  08/23/2018    MUCUS NOT REPORTED 08/23/2018    TRICHOMONAS NOT REPORTED 08/23/2018    YEAST NOT REPORTED 08/23/2018    BACTERIA MODERATE 08/23/2018    SPECGRAV 1.020 08/23/2018    LEUKOCYTESUR TRACE 08/23/2018    UROBILINOGEN Normal 08/23/2018    BILIRUBINUR NEGATIVE 08/23/2018    GLUCOSEU NEGATIVE 08/23/2018    KETUA NEGATIVE 08/23/2018    AMORPHOUS NOT REPORTED 08/23/2018         ASSESSMENT      1. KRISTIN :Most likely secondary to acute tubular interstitial nephritis. Allergic interstitial nephritis secondary to Bactrim is definitely inserted in the differential.  2.  Hypertension: Blood pressures are definitely better after hydration. She is currently off Levophed  3. Baseline CK D stage III with a creatinine of 1.2-1.3 range. 4.  History of urinary tract infections  5. Hyperkalemia secondary to HPI, recent Bactrim use. Potassium is much better  6. Mild volume overload    PLAN      1. D/c IVF  2.  1 dose lasix 40 mg Iv  3. Follow renal fxn  4. Okay to move the patient out of ICU to stepdown. 5.  Follow-up labs ordered. We will continue to closely follow with you. Please do not hesitate to call with questions.     Electronically signed by Bryce Sanchez MD on 8/23/2018 at 3:47 PM

## 2018-08-23 NOTE — FLOWSHEET NOTE
Patient sleeping. No family present. Writer prays for patient, and leaves note of support. Spiritual Care will follow as needed.      08/23/18 1352   Encounter Summary   Services provided to: Patient   Referral/Consult From: Juan   Continue Visiting (8/23/18; sleeping)   Complexity of Encounter Low   Length of Encounter 15 minutes   Routine   Type Follow up   Assessment Sleeping   Intervention Prayer   Outcome Did not respond

## 2018-08-23 NOTE — PROGRESS NOTES
DATE: 2018    NAME: Tabitha Phoenix  MRN: [id number]   : 1945    Patient not seen this date for Physical Therapy due to:  [] Blood transfusion in progress  [x] Cancel by RN (medical status decline)  [] Hemodialysis  []  Refusal by Patient   [] Spine Precautions   [] Strict Bedrest  [] Surgery  [] Testing      [] Other        [] PT being discontinued at this time. Patient independent. No further needs. [] PT being discontinued at this time as the patient has been transferred to hospice care. No further needs.     NORI CHRISTIE, PTA

## 2018-08-23 NOTE — PLAN OF CARE
[facility]    Second Visit Note  For more detailed information please refer to the progress note of the day      8/23/2018    5:43 PM    Name:   Minal Fierro  MRN:     [id number]     Acct:      [de-identified]   Room:   154/0929-20   Day:  3  Admit Date:  8/20/2018  1:21 AM    PCP:   Andre Smith DO  Code Status:  Full Code        Pt vitals were reviewed   New labs were reviewed   Patient was seen    Updated plan :     1. Respiratory status has improved, Lasix ordered per nephrology. Watch in ICU for now due to hypoxia yesterday.         Jennifer Cano DO  8/23/2018  5:43 PM

## 2018-08-23 NOTE — PROGRESS NOTES
medications that have red dye in them or red food. He thinks this reaction was added to chart when the pt had a colonoscopy (possibly barium or iodine dye instead)       Current Meds:   Scheduled Meds:    [START ON 8/24/2018] calcitRIOL  0.25 mcg Oral Daily    amiodarone  200 mg Oral Daily    aspirin  81 mg Oral Daily    atorvastatin  20 mg Oral Nightly    mometasone-formoterol  2 puff Inhalation BID    carbidopa-levodopa  1 tablet Oral 4x Daily    conjugated estrogens  0.5 g Vaginal Every Other Day    ferrous sulfate  325 mg Oral BID WC    metoprolol tartrate  12.5 mg Oral BID    pantoprazole  40 mg Oral BID AC    rasagiline mesylate  1 tablet Oral Daily    rOPINIRole  2 mg Oral TID    senna  2 tablet Oral Daily    vitamin D  5,000 Units Oral Daily    sodium chloride flush  10 mL Intravenous 2 times per day    enoxaparin  30 mg Subcutaneous Daily    insulin lispro  0-12 Units Subcutaneous TID WC    insulin lispro  0-6 Units Subcutaneous Nightly    linagliptin  5 mg Oral Daily    sodium chloride flush  10 mL Intravenous 2 times per day    miconazole   Topical BID    ipratropium-albuterol  1 ampule Inhalation TID    magnesium oxide  400 mg Oral Daily     Continuous Infusions:    dextrose      norepinephrine Stopped (08/22/18 7335)     PRN Meds: metoprolol, acetaminophen, ALPRAZolam, HYDROcodone-acetaminophen, polyethylene glycol, sodium chloride flush, glucose, dextrose, glucagon (rDNA), dextrose, ondansetron **OR** ondansetron, sodium chloride flush, albuterol, promethazine    Data:     Past Medical History:   has a past medical history of Acute on chronic diastolic congestive heart failure ([address] Anesthesia complication; Asthma; Atrial fibrillation ([address] Cataracts, bilateral; Cellulitis; Cerebral artery occlusion with cerebral infarction ([address] Chronic kidney disease; Constipation; COPD (chronic obstructive pulmonary disease) ([address]  Difficult intravenous access; Diverticulosis; DM2 (diabetes mellitus, type 2) ([facility] Full dentures; GERD (gastroesophageal reflux disease); Headache(784.0); [place] (hard of hearing); Hyperlipidemia; Hyperplastic polyp of intestine; Hypertension; Impaired ambulation; Kidney stone; Morbid obesity with BMI of 40.0-44.9, adult ([facility] ECTOR on CPAP; Osteoarthritis; Parkinson disease ([facility] Restless leg syndrome; Spinal stenosis in cervical region; Type II or unspecified type diabetes mellitus without mention of complication, not stated as uncontrolled; Unspecified sleep apnea; Urethral caruncle; and Wears glasses. Social History:   reports that she quit smoking about 47 years ago. Her smoking use included Cigarettes. She has a 30.00 pack-year smoking history. She has never used smokeless tobacco. She reports that she drinks about 1.2 oz of alcohol per week . She reports that she does not use drugs. Family History:   Family History   Problem Relation Age of Onset    Heart Failure Mother     Hypertension Mother     Heart Disease Mother     High Blood Pressure Mother        Vitals:  BP (!) 115/48   Pulse 80   Temp 97 °F (36.1 °C) (Infrared)   Resp 20   Ht 5' 1\" (1.549 m)   Wt 190 lb 14.7 oz (86.6 kg)   LMP 2004 (Within Years)   SpO2 94%   BMI 36.07 kg/m²   Temp (24hrs), Av.3 °F (36.8 °C), Min:97 °F (36.1 °C), Max:99.1 °F (37.3 °C)    Recent Labs      18   1654  18   2053  18   0804  18   1145   POCGLU  78  102  161*  96       I/O (24Hr):     Intake/Output Summary (Last 24 hours) at 18 1646  Last data filed at 18 1623   Gross per 24 hour   Intake             1520 ml   Output             1675 ml   Net             -155 ml       Labs:    Hematology:  Recent Labs      18   0553  18   0419  18   0617   WBC  9.2  7.3  13.6*   RBC  2.67*  2.59*  2.98*   HGB  8.9*  8.7*  9.9*   HCT  27.5*  26.9*  31.1*   MCV  103.1*  104.1*  104.3*   MCH  33.3  33.5  33.3   MCHC  32.3  32.2  31.9   RDW  14.9*  15.1*  14.9* [R11.2] 08/20/2018    Acute kidney injury superimposed on chronic kidney disease ([facility] [N17.9, N18.9] 08/20/2018    Bacterial UTI [N39.0, A49.9] 08/20/2018    Dehydration [E86.0]     Anemia of chronic renal failure, stage 4 (severe) ([facility] [N18.4, D63.1] 04/25/2018    CKD stage 4 due to type 2 diabetes mellitus ([facility] [G04.31, N18.4] 03/08/2018    ECTOR on CPAP [G47.33, Z99.89]     Chronic atrial fibrillation ([facility] [I48.2] 05/31/2013    Essential hypertension [I10] 06/07/2012    Controlled type 2 diabetes mellitus with stage 3 chronic kidney disease, without long-term current use of insulin ([facility] [E11.22, N18.3]        Plan:        1. Stop IV fluids, Monitor renal function and respiratory status  2. Chest x-ray ordered at time of evaluation, reviewed at this time positive edema  3. Monitor and control blood pressure  4. Glycemic control  5. Monitor off antibiotics, repeat UA  6. GI and DVT prophylaxis  7. BiPAP as needed  8. Aerosols as needed  9. PT and OT  10.  Follow labs, supplement potassium etc. as needed    Jeannette Lynn DO  8/23/2018  4:46 PM

## 2018-08-24 PROBLEM — I48.91 RAPID ATRIAL FIBRILLATION (HCC): Status: ACTIVE | Noted: 2018-08-23

## 2018-08-24 PROBLEM — R57.1 HYPOVOLEMIC SHOCK (HCC): Status: ACTIVE | Noted: 2018-08-24

## 2018-08-24 PROBLEM — N18.30 CKD STAGE 3 DUE TO TYPE 2 DIABETES MELLITUS (HCC): Status: ACTIVE | Noted: 2018-03-08

## 2018-08-24 PROBLEM — E11.22 CKD STAGE 3 DUE TO TYPE 2 DIABETES MELLITUS (HCC): Status: ACTIVE | Noted: 2018-03-08

## 2018-08-24 LAB
ABSOLUTE EOS #: 0.1 K/UL (ref 0–0.4)
ABSOLUTE IMMATURE GRANULOCYTE: ABNORMAL K/UL (ref 0–0.3)
ABSOLUTE LYMPH #: 1.2 K/UL (ref 1–4.8)
ABSOLUTE MONO #: 0.7 K/UL (ref 0.2–0.8)
ANION GAP SERPL CALCULATED.3IONS-SCNC: 12 MMOL/L (ref 9–17)
BASOPHILS # BLD: 0 % (ref 0–2)
BASOPHILS ABSOLUTE: 0 K/UL (ref 0–0.2)
BUN BLDV-MCNC: 41 MG/DL (ref 8–23)
BUN/CREAT BLD: 14 (ref 9–20)
CALCIUM IONIZED: 1.39 MMOL/L (ref 1.13–1.33)
CALCIUM SERPL-MCNC: 9.5 MG/DL (ref 8.6–10.4)
CHLORIDE BLD-SCNC: 102 MMOL/L (ref 98–107)
CO2: 26 MMOL/L (ref 20–31)
CREAT SERPL-MCNC: 3 MG/DL (ref 0.5–0.9)
DIFFERENTIAL TYPE: ABNORMAL
EOSINOPHILS RELATIVE PERCENT: 1 % (ref 1–4)
GFR AFRICAN AMERICAN: 19 ML/MIN
GFR NON-AFRICAN AMERICAN: 15 ML/MIN
GFR SERPL CREATININE-BSD FRML MDRD: ABNORMAL ML/MIN/{1.73_M2}
GFR SERPL CREATININE-BSD FRML MDRD: ABNORMAL ML/MIN/{1.73_M2}
GLUCOSE BLD-MCNC: 103 MG/DL (ref 70–99)
GLUCOSE BLD-MCNC: 111 MG/DL (ref 65–105)
GLUCOSE BLD-MCNC: 122 MG/DL (ref 65–105)
GLUCOSE BLD-MCNC: 147 MG/DL (ref 65–105)
GLUCOSE BLD-MCNC: 86 MG/DL (ref 65–105)
HCT VFR BLD CALC: 28.7 % (ref 36–46)
HEMOGLOBIN: 9.1 G/DL (ref 12–16)
IMMATURE GRANULOCYTES: ABNORMAL %
LV EF: 63 %
LVEF MODALITY: NORMAL
LYMPHOCYTES # BLD: 12 % (ref 24–44)
MAGNESIUM: 2.1 MG/DL (ref 1.6–2.6)
MCH RBC QN AUTO: 33.2 PG (ref 26–34)
MCHC RBC AUTO-ENTMCNC: 31.8 G/DL (ref 31–37)
MCV RBC AUTO: 104.4 FL (ref 80–100)
MONOCYTES # BLD: 7 % (ref 1–7)
NRBC AUTOMATED: ABNORMAL PER 100 WBC
P E INTERPRETATION, U: NORMAL
PARTIAL THROMBOPLASTIN TIME: 43.4 SEC (ref 23–31)
PARTIAL THROMBOPLASTIN TIME: 44.9 SEC (ref 23–31)
PARTIAL THROMBOPLASTIN TIME: 56.4 SEC (ref 23–31)
PATHOLOGIST: NORMAL
PDW BLD-RTO: 14.8 % (ref 11.5–14.5)
PLATELET # BLD: 134 K/UL (ref 130–400)
PLATELET ESTIMATE: ABNORMAL
PMV BLD AUTO: 7.7 FL (ref 6–12)
POTASSIUM SERPL-SCNC: 4.5 MMOL/L (ref 3.7–5.3)
RBC # BLD: 2.75 M/UL (ref 4–5.2)
RBC # BLD: ABNORMAL 10*6/UL
SEDIMENTATION RATE, ERYTHROCYTE: 79 MM (ref 0–20)
SEG NEUTROPHILS: 80 % (ref 36–66)
SEGMENTED NEUTROPHILS ABSOLUTE COUNT: 7.8 K/UL (ref 1.8–7.7)
SODIUM BLD-SCNC: 140 MMOL/L (ref 135–144)
SPECIMEN TYPE: NORMAL
TROPONIN INTERP: NORMAL
TROPONIN INTERP: NORMAL
TROPONIN T: <0.03 NG/ML
TROPONIN T: <0.03 NG/ML
URIC ACID: 8.6 MG/DL (ref 2.4–5.7)
URINE IFX INTERP: NORMAL
URINE IFX SPECIMEN: NORMAL
URINE TOTAL PROTEIN: 49 MG/DL
URINE TOTAL PROTEIN: 49 MG/DL
VOLUME: NORMAL ML
WBC # BLD: 9.8 K/UL (ref 3.5–11)
WBC # BLD: ABNORMAL 10*3/UL

## 2018-08-24 PROCEDURE — 2000000000 HC ICU R&B

## 2018-08-24 PROCEDURE — 82330 ASSAY OF CALCIUM: CPT

## 2018-08-24 PROCEDURE — 6370000000 HC RX 637 (ALT 250 FOR IP): Performed by: INTERNAL MEDICINE

## 2018-08-24 PROCEDURE — 84484 ASSAY OF TROPONIN QUANT: CPT

## 2018-08-24 PROCEDURE — 6360000002 HC RX W HCPCS: Performed by: INTERNAL MEDICINE

## 2018-08-24 PROCEDURE — 99232 SBSQ HOSP IP/OBS MODERATE 35: CPT | Performed by: INTERNAL MEDICINE

## 2018-08-24 PROCEDURE — 85730 THROMBOPLASTIN TIME PARTIAL: CPT

## 2018-08-24 PROCEDURE — 84550 ASSAY OF BLOOD/URIC ACID: CPT

## 2018-08-24 PROCEDURE — 97110 THERAPEUTIC EXERCISES: CPT

## 2018-08-24 PROCEDURE — 36415 COLL VENOUS BLD VENIPUNCTURE: CPT

## 2018-08-24 PROCEDURE — 94761 N-INVAS EAR/PLS OXIMETRY MLT: CPT

## 2018-08-24 PROCEDURE — 2580000003 HC RX 258: Performed by: NURSE PRACTITIONER

## 2018-08-24 PROCEDURE — 6370000000 HC RX 637 (ALT 250 FOR IP): Performed by: NURSE PRACTITIONER

## 2018-08-24 PROCEDURE — 94640 AIRWAY INHALATION TREATMENT: CPT

## 2018-08-24 PROCEDURE — 2580000003 HC RX 258: Performed by: RADIOLOGY

## 2018-08-24 PROCEDURE — 85025 COMPLETE CBC W/AUTO DIFF WBC: CPT

## 2018-08-24 PROCEDURE — 93306 TTE W/DOPPLER COMPLETE: CPT

## 2018-08-24 PROCEDURE — 93971 EXTREMITY STUDY: CPT

## 2018-08-24 PROCEDURE — 82947 ASSAY GLUCOSE BLOOD QUANT: CPT

## 2018-08-24 PROCEDURE — 94660 CPAP INITIATION&MGMT: CPT

## 2018-08-24 PROCEDURE — 85651 RBC SED RATE NONAUTOMATED: CPT

## 2018-08-24 PROCEDURE — 83735 ASSAY OF MAGNESIUM: CPT

## 2018-08-24 PROCEDURE — 80048 BASIC METABOLIC PNL TOTAL CA: CPT

## 2018-08-24 PROCEDURE — 2700000000 HC OXYGEN THERAPY PER DAY

## 2018-08-24 RX ORDER — CALCITRIOL 0.25 UG/1
0.25 CAPSULE, LIQUID FILLED ORAL 2 TIMES DAILY
Status: DISCONTINUED | OUTPATIENT
Start: 2018-08-24 | End: 2018-08-25

## 2018-08-24 RX ORDER — IPRATROPIUM BROMIDE AND ALBUTEROL SULFATE 2.5; .5 MG/3ML; MG/3ML
1 SOLUTION RESPIRATORY (INHALATION)
Status: DISCONTINUED | OUTPATIENT
Start: 2018-08-24 | End: 2018-08-25

## 2018-08-24 RX ADMIN — CALCITRIOL 0.25 MCG: 0.25 CAPSULE, LIQUID FILLED ORAL at 08:18

## 2018-08-24 RX ADMIN — MICONAZOLE NITRATE: 20.6 POWDER TOPICAL at 08:29

## 2018-08-24 RX ADMIN — HEPARIN SODIUM AND DEXTROSE 13.5 UNITS/KG/HR: 10000; 5 INJECTION INTRAVENOUS at 15:48

## 2018-08-24 RX ADMIN — Medication 2 PUFF: at 07:51

## 2018-08-24 RX ADMIN — Medication 10 ML: at 20:54

## 2018-08-24 RX ADMIN — SENNOSIDES 17.2 MG: 8.6 TABLET, FILM COATED ORAL at 08:18

## 2018-08-24 RX ADMIN — FERROUS SULFATE TAB EC 325 MG (65 MG FE EQUIVALENT) 325 MG: 325 (65 FE) TABLET DELAYED RESPONSE at 15:57

## 2018-08-24 RX ADMIN — IPRATROPIUM BROMIDE AND ALBUTEROL SULFATE 1 AMPULE: .5; 3 SOLUTION RESPIRATORY (INHALATION) at 20:04

## 2018-08-24 RX ADMIN — CARBIDOPA AND LEVODOPA 1 TABLET: 25; 100 TABLET, EXTENDED RELEASE ORAL at 20:52

## 2018-08-24 RX ADMIN — FERROUS SULFATE TAB EC 325 MG (65 MG FE EQUIVALENT) 325 MG: 325 (65 FE) TABLET DELAYED RESPONSE at 08:22

## 2018-08-24 RX ADMIN — METOPROLOL TARTRATE 12.5 MG: 25 TABLET ORAL at 20:52

## 2018-08-24 RX ADMIN — PANTOPRAZOLE SODIUM 40 MG: 40 TABLET, DELAYED RELEASE ORAL at 15:48

## 2018-08-24 RX ADMIN — HYDROCODONE BITARTRATE AND ACETAMINOPHEN 1 TABLET: 5; 325 TABLET ORAL at 08:25

## 2018-08-24 RX ADMIN — ATORVASTATIN CALCIUM 20 MG: 20 TABLET, FILM COATED ORAL at 20:53

## 2018-08-24 RX ADMIN — ROPINIROLE HYDROCHLORIDE 2 MG: 2 TABLET, FILM COATED ORAL at 20:53

## 2018-08-24 RX ADMIN — CARBIDOPA AND LEVODOPA 1 TABLET: 25; 100 TABLET, EXTENDED RELEASE ORAL at 17:54

## 2018-08-24 RX ADMIN — Medication 10 ML: at 20:56

## 2018-08-24 RX ADMIN — PANTOPRAZOLE SODIUM 40 MG: 40 TABLET, DELAYED RELEASE ORAL at 06:46

## 2018-08-24 RX ADMIN — RASAGILINE 1 MG: 1 TABLET ORAL at 11:34

## 2018-08-24 RX ADMIN — MICONAZOLE NITRATE: 20.6 POWDER TOPICAL at 20:55

## 2018-08-24 RX ADMIN — LINAGLIPTIN 5 MG: 5 TABLET, FILM COATED ORAL at 08:22

## 2018-08-24 RX ADMIN — HYDROCODONE BITARTRATE AND ACETAMINOPHEN 1 TABLET: 5; 325 TABLET ORAL at 19:56

## 2018-08-24 RX ADMIN — ROPINIROLE HYDROCHLORIDE 2 MG: 2 TABLET, FILM COATED ORAL at 15:50

## 2018-08-24 RX ADMIN — CALCITRIOL 0.25 MCG: 0.25 CAPSULE ORAL at 20:53

## 2018-08-24 RX ADMIN — Medication 2 PUFF: at 20:05

## 2018-08-24 RX ADMIN — IPRATROPIUM BROMIDE AND ALBUTEROL SULFATE 1 AMPULE: .5; 3 SOLUTION RESPIRATORY (INHALATION) at 11:58

## 2018-08-24 RX ADMIN — CARBIDOPA AND LEVODOPA 1 TABLET: 25; 100 TABLET, EXTENDED RELEASE ORAL at 12:40

## 2018-08-24 RX ADMIN — AMIODARONE HYDROCHLORIDE 200 MG: 200 TABLET ORAL at 08:19

## 2018-08-24 RX ADMIN — ASPIRIN 81 MG: 81 TABLET, COATED ORAL at 08:22

## 2018-08-24 RX ADMIN — CARBIDOPA AND LEVODOPA 1 TABLET: 25; 100 TABLET, EXTENDED RELEASE ORAL at 08:22

## 2018-08-24 RX ADMIN — INSULIN LISPRO 2 UNITS: 100 INJECTION, SOLUTION INTRAVENOUS; SUBCUTANEOUS at 12:40

## 2018-08-24 RX ADMIN — METOPROLOL TARTRATE 12.5 MG: 25 TABLET ORAL at 08:20

## 2018-08-24 RX ADMIN — HEPARIN SODIUM 2000 UNITS: 1000 INJECTION, SOLUTION INTRAVENOUS; SUBCUTANEOUS at 09:05

## 2018-08-24 RX ADMIN — CHOLECALCIFEROL CAP 125 MCG (5000 UNIT) 5000 UNITS: 125 CAP at 08:22

## 2018-08-24 RX ADMIN — HYDROCODONE BITARTRATE AND ACETAMINOPHEN 1 TABLET: 5; 325 TABLET ORAL at 15:56

## 2018-08-24 RX ADMIN — IPRATROPIUM BROMIDE AND ALBUTEROL SULFATE 1 AMPULE: .5; 3 SOLUTION RESPIRATORY (INHALATION) at 15:35

## 2018-08-24 RX ADMIN — ROPINIROLE HYDROCHLORIDE 2 MG: 2 TABLET, FILM COATED ORAL at 08:18

## 2018-08-24 RX ADMIN — MAGNESIUM OXIDE TAB 400 MG (241.3 MG ELEMENTAL MG) 400 MG: 400 (241.3 MG) TAB at 08:19

## 2018-08-24 RX ADMIN — IPRATROPIUM BROMIDE AND ALBUTEROL SULFATE 1 AMPULE: .5; 3 SOLUTION RESPIRATORY (INHALATION) at 07:49

## 2018-08-24 ASSESSMENT — PAIN SCALES - GENERAL
PAINLEVEL_OUTOF10: 4
PAINLEVEL_OUTOF10: 6
PAINLEVEL_OUTOF10: 4
PAINLEVEL_OUTOF10: 10

## 2018-08-24 ASSESSMENT — PAIN DESCRIPTION - LOCATION
LOCATION: NECK
LOCATION: GENERALIZED

## 2018-08-24 ASSESSMENT — PAIN DESCRIPTION - PAIN TYPE
TYPE: ACUTE PAIN

## 2018-08-24 NOTE — PROGRESS NOTES
EXAM      GENERAL APPEARANCE:Awake, alert, in no acute distress On BiPAP  SKIN: warm and dry, no rash or erythema  EYES: conjunctivae normal and sclera anicteric  ENT: no thrush no pharyngeal congestion   NECK:   No JVD. No carotid bruits and no carotid lymphadenopathy . PULMONARY: lungs are Diminished to auscultation. No Wheezing, no ronchi . CADRDIOVASCULAR: S1 and S2 normal NO S3 and NO S4 . No rubs , no murmur. ABDOMEN: soft nontender, bowel sounds present, no organomegaly, no ascites.        EXTREMITIES: no cyanosis, clubbing or edema     CURRENT MEDICATIONS        ipratropium-albuterol (DUONEB) nebulizer solution 1 ampule Q4H WA   calcitRIOL (ROCALTROL) capsule 0.25 mcg BID   metoprolol (LOPRESSOR) injection 5 mg Q4H PRN   diltiazem 125 mg in dextrose 5 % 125 mL infusion Continuous   heparin (porcine) injection 4,000 Units PRN   heparin (porcine) injection 2,000 Units PRN   heparin 25,000 units in dextrose 5% 250 mL infusion Continuous   acetaminophen (TYLENOL) tablet 650 mg Q4H PRN   ALPRAZolam (XANAX) tablet 0.25 mg TID PRN   amiodarone (CORDARONE) tablet 200 mg Daily   aspirin EC tablet 81 mg Daily   atorvastatin (LIPITOR) tablet 20 mg Nightly   mometasone-formoterol (DULERA) 200-5 MCG/ACT inhaler 2 puff BID   carbidopa-levodopa (SINEMET CR)  MG per extended release tablet 1 tablet 4x Daily   conjugated estrogens (PREMARIN) vaginal cream 0.5 g Every Other Day   ferrous sulfate EC tablet 325 mg BID WC   HYDROcodone-acetaminophen (NORCO) 5-325 MG per tablet 1 tablet Q4H PRN   metoprolol tartrate (LOPRESSOR) tablet 12.5 mg BID   pantoprazole (PROTONIX) tablet 40 mg BID AC   polyethylene glycol (GLYCOLAX) packet 17 g Daily PRN   rasagiline mesylate TABS 1 mg Daily   rOPINIRole (REQUIP) tablet 2 mg TID   senna (SENOKOT) tablet 17.2 mg Daily   vitamin D (CHOLECALCIFEROL) capsule 5,000 Units Daily   sodium chloride flush 0.9 % injection 10 mL 2 times per day   sodium chloride flush 0.9 % injection 10 mL

## 2018-08-24 NOTE — PROGRESS NOTES
Score : 7  AM-PAC Inpatient T-Scale Score : 26.42  Mobility Inpatient CMS 0-100% Score: 92.36  Mobility Inpatient CMS G-Code Modifier : CM          Goals  Short term goals  Time Frame for Short term goals: 12 tx's  Short term goal 1: Bed mobiity independent   Short term goal 2: Dangled x 10 minutes   Short term goal 3: Progress function and re eval as pt on bedrest.   Patient Goals   Patient goals : Pt goal is to keep her strength up    Plan    Plan  Times per week: 1-2x/day. 5-6x/week. Current Treatment Recommendations: Strengthening, ROM, Safety Education & Training, Positioning, Patient/Caregiver Education & Training, Home Exercise Program  Safety Devices  Type of devices:  All fall risk precautions in place, Call light within reach, Patient at risk for falls, Left in bed     Therapy Time   Individual Concurrent Group Co-treatment   Time In 1019         Time Out 1035         Minutes Ania [address], Student Physical Therapist Assistant

## 2018-08-24 NOTE — PROGRESS NOTES
palpitations  Gastrointestinal:  negative for abdominal pain, constipation, diarrhea, nausea, vomiting  Neurological:  negative for dizziness, headache    Medications: Allergies: Allergies   Allergen Reactions    Dye [Barium-Containing Compounds] Other (See Comments)     Cause Afib per     Pcn [Penicillins] Itching and Swelling    Red Dye Itching and Rash     This allergy is likely incorrect:  Per patient's  she has no issue with medications that have red dye in them or red food.  He thinks this reaction was added to chart when the pt had a colonoscopy (possibly barium or iodine dye instead)       Current Meds:   Scheduled Meds:    ipratropium-albuterol  1 ampule Inhalation Q4H WA    calcitRIOL  0.25 mcg Oral BID    amiodarone  200 mg Oral Daily    aspirin  81 mg Oral Daily    atorvastatin  20 mg Oral Nightly    mometasone-formoterol  2 puff Inhalation BID    carbidopa-levodopa  1 tablet Oral 4x Daily    conjugated estrogens  0.5 g Vaginal Every Other Day    ferrous sulfate  325 mg Oral BID WC    metoprolol tartrate  12.5 mg Oral BID    pantoprazole  40 mg Oral BID AC    rasagiline mesylate  1 tablet Oral Daily    rOPINIRole  2 mg Oral TID    senna  2 tablet Oral Daily    vitamin D  5,000 Units Oral Daily    sodium chloride flush  10 mL Intravenous 2 times per day    insulin lispro  0-12 Units Subcutaneous TID WC    insulin lispro  0-6 Units Subcutaneous Nightly    linagliptin  5 mg Oral Daily    sodium chloride flush  10 mL Intravenous 2 times per day    miconazole   Topical BID    magnesium oxide  400 mg Oral Daily     Continuous Infusions:    diltiazem (CARDIZEM) 125 mg in dextrose 5% 125 mL infusion Stopped (08/24/18 0506)    heparin (porcine) 13.5 Units/kg/hr (08/24/18 0911)    dextrose      norepinephrine Stopped (08/24/18 0647)     PRN Meds: metoprolol, heparin (porcine), heparin (porcine), acetaminophen, ALPRAZolam, HYDROcodone-acetaminophen, polyethylene glycol, sodium chloride flush, glucose, dextrose, glucagon (rDNA), dextrose, ondansetron **OR** ondansetron, sodium chloride flush, albuterol, promethazine    Data:     Past Medical History:   has a past medical history of Acute on chronic diastolic congestive heart failure ([facility] Anesthesia complication; Asthma; At[facility] fibrillation ([facility] Cataracts, bilateral; Cellulitis; Cerebral artery occlusion with cerebral infarction ([facility] Chronic kidney disease; Constipation; COPD (chronic obstructive pulmonary disease) ([facility] Difficult intravenous access; Diverticulosis; DM2 (diabetes mellitus, type 2) ([facility] Full dentures; GERD (gastroesophageal reflux disease); Headache(784.0); [place] (hard of hearing); Hyperlipidemia; Hyperplastic polyp of intestine; Hypertension; Impaired ambulation; Kidney stone; Morbid obesity with BMI of 40.0-44.9, adult ([facility] ECTOR on CPAP; Osteoarthritis; Parkinson disease ([facility] Restless leg syndrome; Spinal stenosis in cervical region; Type II or unspecified type diabetes mellitus without mention of complication, not stated as uncontrolled; Unspecified sleep apnea; Urethral caruncle; and Wears glasses. Social History:   reports that she quit smoking about 47 years ago. Her smoking use included Cigarettes. She has a 30.00 pack-year smoking history. She has never used smokeless tobacco. She reports that she drinks about 1.2 oz of alcohol per week . She reports that she does not use drugs.      Family History:   Family History   Problem Relation Age of Onset    Heart Failure Mother     Hypertension Mother     Heart Disease Mother     High Blood Pressure Mother        Vitals:  /80   Pulse 77   Temp 99.1 °F (37.3 °C) (Oral)   Resp 23   Ht 5' 1\" (1.549 m)   Wt 192 lb 11.2 oz (87.4 kg)   LMP 2004 (Within Years)   SpO2 94%   BMI 36.41 kg/m²   Temp (24hrs), Av.1 °F (36.7 °C), Min:97 °F (36.1 °C), Max:99.1 °F (37.3 °C)    Recent Labs      18   1653  18 08/24/18   0850  08/24/18   1115   POCGLU  111*  99  86  147*       I/O (24Hr):     Intake/Output Summary (Last 24 hours) at 08/24/18 1446  Last data filed at 08/24/18 0519   Gross per 24 hour   Intake             1208 ml   Output             1750 ml   Net             -542 ml       Labs:    Hematology:  Recent Labs      08/23/18 0617 08/23/18 1953 08/24/18   0436   WBC  13.6*  10.7  9.8   RBC  2.98*  2.63*  2.75*   HGB  9.9*  9.1*  9.1*   HCT  31.1*  27.5*  28.7*   MCV  104.3*  104.4*  104.4*   MCH  33.3  34.4*  33.2   MCHC  31.9  32.9  31.8   RDW  14.9*  15.1*  14.8*   PLT  117*  104*  134   MPV  NOT REPORTED  8.5  7.7   SEDRATE   --    --   79*     Chemistry:  Recent Labs      08/22/18   0419  08/23/18   0617 08/23/18 1953 08/24/18   0212  08/24/18   0436   NA  138  140   --    --   140   K  4.3  4.8   --    --   4.5   CL  103  104   --    --   102   CO2  25  24   --    --   26   GLUCOSE  106*  116*   --    --   103*   BUN  44*  40*   --    --   41*   CREATININE  4.33*  3.71*   --    --   3.00*   MG  2.3  2.2   --    --   2.1   ANIONGAP  10  12   --    --   12   LABGLOM  10*  12*   --    --   15*   GFRAA  12*  15*   --    --   19*   CALCIUM  8.9  9.4   --    --   9.5   CAION  1.30  1.36*   --    --   1.39*   TROPONINT   --    --   <0.03  <0.03  <0.03     Recent Labs      08/23/18   0804  08/23/18   1145  08/23/18   1653  08/23/18   2040  08/24/18   0436  08/24/18   0850  08/24/18   1115   URICACID   --    --    --    --   8.6*   --    --    POCGLU  161*  96  111*  99   --   86  147*         Lab Results   Component Value Date/Time    SPECIAL LT HAND 7ML 08/20/2018 03:45 AM     Lab Results   Component Value Date/Time    CULTURE NO GROWTH 4 DAYS 08/20/2018 03:45 AM       Lab Results   Component Value Date    POCPH 7.36 06/02/2013    POCPCO2 55 06/02/2013    POCPO2 63 06/02/2013    POCHCO3 31.0 06/02/2013    NBEA NOT REPORTED 06/02/2013    PBEA 6 06/02/2013    TOO2SEY 33 06/02/2013    UCDR8QSW 90 06/02/2013

## 2018-08-24 NOTE — PLAN OF CARE
[facility]    Second Visit Note  For more detailed information please refer to the progress note of the day      8/24/2018    5:27 PM    Name:   Katie Brody  MRN:     [id number]     Acct:      [de-identified]   Room:   3707/8759-66   Day:  4  Admit Date:  8/20/2018  1:21 AM    PCP:   Enoc Leon,   Code Status:  Full Code        Pt vitals were reviewed   New labs were reviewed   Patient was seen    Updated plan :     1. Swelling of right upper extremity, doppler ordered and pending.   2. Discussed with family        Antony Knox,   8/24/2018  5:27 PM

## 2018-08-24 NOTE — PLAN OF CARE
Problem: Risk for Impaired Skin Integrity  Goal: Tissue integrity - skin and mucous membranes  Structural intactness and normal physiological function of skin and  mucous membranes. Outcome: Ongoing  Patient has minor bruising and abrasions but remains free from skin tears and pressure ulcers. Patient repositions self with reminders every 2 hours and has increased dietary intake to adequate level. Will continue to monitor. Intervention: PRESSURE ULCER PREVENTION  Patient is laying on specialized mattress and repositions self with reminders every two hours.

## 2018-08-24 NOTE — PROGRESS NOTES
2 times daily 3/10/18  Yes Pratima Villa MD   aspirin 81 MG tablet Take 81 mg by mouth daily LAST DOSE 03/31/2018   Yes Historical Provider, MD   rOPINIRole (REQUIP) 2 MG tablet Take 2 mg by mouth 3 times daily   Yes Historical Provider, MD   pantoprazole (PROTONIX) 40 MG tablet Take 1 tablet by mouth 2 times daily (before meals) 1/22/18  Yes Lesly Hill DO   BREO ELLIPTA 100-25 MCG/INH AEPB inhaler Inhale 1 puff into the lungs daily 1/22/18  Yes Lesly Hill DO   ferrous sulfate 325 (65 Fe) MG EC tablet Take 1 tablet by mouth 2 times daily (with meals) 12/21/17  Yes Jose HO Blood, DO   vitamin D (CHOLECALCIFEROL) 5000 units CAPS capsule Take 5,000 Units by mouth daily   Yes Historical Provider, MD   rasagiline mesylate 1 MG TABS Take 1 tablet by mouth daily   Yes Historical Provider, MD   amiodarone (CORDARONE) 200 MG tablet Take 1 tablet by mouth daily 8/27/17  Yes Valorie HO Blood, DO   albuterol (PROVENTIL) (2.5 MG/3ML) 0.083% nebulizer solution Take 3 mLs by nebulization every 6 hours as needed for Wheezing 7/18/17  Yes Lesly Hill DO   Oxygen Concentrator Dx: Pneumonia, use as directed. Patient taking differently: Dx: Pneumonia, use as directed.    ON 24/7 2/10/17  Yes Lesly Hill DO   calcitRIOL (ROCALTROL) 0.25 MCG capsule TAKE 1 CAPSULE THREE TIMES A DAY 8/22/18   Lesly Hill DO   ondansetron (ZOFRAN) 8 MG tablet TK 1 T PO Q 8 H PRF NAUSEA OR VOM 5/17/18   Historical Provider, MD   HYDROcodone-acetaminophen (NORCO) 5-325 MG per tablet TK 1 T PO BID PRF PAIN 7/16/18   Historical Provider, MD   .  Recent Surgical History: None = 0     Assessment     Peak Flow (asthma only)    Predicted: na  Personal Best: na  PEF na  % Predicted na  Peak Flow : not applicable = 0    JQN0/JRT    FEV1 Predicted na      FEV1  Unable on bipap    FEV1 % Predicted na  FVC na  IS volume na  IBW na  FIO2% 35%SPO2 96%  RR 20  Breath Sounds: diminished      · Bronchodilator assessment at level

## 2018-08-25 ENCOUNTER — APPOINTMENT (OUTPATIENT)
Dept: GENERAL RADIOLOGY | Age: 73
DRG: 871 | End: 2018-08-25
Payer: COMMERCIAL

## 2018-08-25 PROBLEM — D69.6 THROMBOCYTOPENIA (HCC): Status: ACTIVE | Noted: 2018-08-25

## 2018-08-25 LAB
ABSOLUTE EOS #: 0.2 K/UL (ref 0–0.4)
ABSOLUTE IMMATURE GRANULOCYTE: ABNORMAL K/UL (ref 0–0.3)
ABSOLUTE LYMPH #: 0.9 K/UL (ref 1–4.8)
ABSOLUTE MONO #: 0.5 K/UL (ref 0.2–0.8)
ANION GAP SERPL CALCULATED.3IONS-SCNC: 14 MMOL/L (ref 9–17)
BASOPHILS # BLD: 1 % (ref 0–2)
BASOPHILS ABSOLUTE: 0 K/UL (ref 0–0.2)
BUN BLDV-MCNC: 42 MG/DL (ref 8–23)
BUN/CREAT BLD: 15 (ref 9–20)
CALCIUM IONIZED: 1.41 MMOL/L (ref 1.13–1.33)
CALCIUM SERPL-MCNC: 9.9 MG/DL (ref 8.6–10.4)
CHLORIDE BLD-SCNC: 101 MMOL/L (ref 98–107)
CO2: 23 MMOL/L (ref 20–31)
CREAT SERPL-MCNC: 2.73 MG/DL (ref 0.5–0.9)
DIFFERENTIAL TYPE: ABNORMAL
EOSINOPHILS RELATIVE PERCENT: 2 % (ref 1–4)
GFR AFRICAN AMERICAN: 21 ML/MIN
GFR NON-AFRICAN AMERICAN: 17 ML/MIN
GFR SERPL CREATININE-BSD FRML MDRD: ABNORMAL ML/MIN/{1.73_M2}
GFR SERPL CREATININE-BSD FRML MDRD: ABNORMAL ML/MIN/{1.73_M2}
GLUCOSE BLD-MCNC: 114 MG/DL (ref 70–99)
GLUCOSE BLD-MCNC: 120 MG/DL (ref 65–105)
GLUCOSE BLD-MCNC: 126 MG/DL (ref 65–105)
GLUCOSE BLD-MCNC: 131 MG/DL (ref 65–105)
HCT VFR BLD CALC: 24.7 % (ref 36–46)
HEMOGLOBIN: 8 G/DL (ref 12–16)
IMMATURE GRANULOCYTES: ABNORMAL %
LYMPHOCYTES # BLD: 12 % (ref 24–44)
MAGNESIUM: 1.8 MG/DL (ref 1.6–2.6)
MCH RBC QN AUTO: 33.7 PG (ref 26–34)
MCHC RBC AUTO-ENTMCNC: 32.5 G/DL (ref 31–37)
MCV RBC AUTO: 104 FL (ref 80–100)
MONOCYTES # BLD: 7 % (ref 1–7)
NRBC AUTOMATED: ABNORMAL PER 100 WBC
PDW BLD-RTO: 15.6 % (ref 11.5–14.5)
PLATELET # BLD: 117 K/UL (ref 130–400)
PLATELET ESTIMATE: ABNORMAL
PMV BLD AUTO: 7.8 FL (ref 6–12)
POTASSIUM SERPL-SCNC: 4.4 MMOL/L (ref 3.7–5.3)
RBC # BLD: 2.38 M/UL (ref 4–5.2)
RBC # BLD: ABNORMAL 10*6/UL
SEG NEUTROPHILS: 78 % (ref 36–66)
SEGMENTED NEUTROPHILS ABSOLUTE COUNT: 6.2 K/UL (ref 1.8–7.7)
SODIUM BLD-SCNC: 138 MMOL/L (ref 135–144)
T3 FREE: 1.33 PG/ML (ref 2.02–4.43)
TSH SERPL DL<=0.05 MIU/L-ACNC: 2.58 MIU/L (ref 0.3–5)
WBC # BLD: 7.9 K/UL (ref 3.5–11)
WBC # BLD: ABNORMAL 10*3/UL

## 2018-08-25 PROCEDURE — 2500000003 HC RX 250 WO HCPCS: Performed by: INTERNAL MEDICINE

## 2018-08-25 PROCEDURE — 2000000000 HC ICU R&B

## 2018-08-25 PROCEDURE — 99232 SBSQ HOSP IP/OBS MODERATE 35: CPT | Performed by: INTERNAL MEDICINE

## 2018-08-25 PROCEDURE — 84481 FREE ASSAY (FT-3): CPT

## 2018-08-25 PROCEDURE — 94761 N-INVAS EAR/PLS OXIMETRY MLT: CPT

## 2018-08-25 PROCEDURE — 2580000003 HC RX 258: Performed by: RADIOLOGY

## 2018-08-25 PROCEDURE — 94640 AIRWAY INHALATION TREATMENT: CPT

## 2018-08-25 PROCEDURE — 85025 COMPLETE CBC W/AUTO DIFF WBC: CPT

## 2018-08-25 PROCEDURE — 2580000003 HC RX 258: Performed by: NURSE PRACTITIONER

## 2018-08-25 PROCEDURE — 84443 ASSAY THYROID STIM HORMONE: CPT

## 2018-08-25 PROCEDURE — 6370000000 HC RX 637 (ALT 250 FOR IP): Performed by: NURSE PRACTITIONER

## 2018-08-25 PROCEDURE — 6370000000 HC RX 637 (ALT 250 FOR IP): Performed by: INTERNAL MEDICINE

## 2018-08-25 PROCEDURE — 71045 X-RAY EXAM CHEST 1 VIEW: CPT

## 2018-08-25 PROCEDURE — 83735 ASSAY OF MAGNESIUM: CPT

## 2018-08-25 PROCEDURE — 82947 ASSAY GLUCOSE BLOOD QUANT: CPT

## 2018-08-25 PROCEDURE — 82330 ASSAY OF CALCIUM: CPT

## 2018-08-25 PROCEDURE — 2580000003 HC RX 258: Performed by: INTERNAL MEDICINE

## 2018-08-25 PROCEDURE — 36415 COLL VENOUS BLD VENIPUNCTURE: CPT

## 2018-08-25 PROCEDURE — 80048 BASIC METABOLIC PNL TOTAL CA: CPT

## 2018-08-25 RX ORDER — CALCITRIOL 0.25 UG/1
0.25 CAPSULE, LIQUID FILLED ORAL DAILY
Status: DISCONTINUED | OUTPATIENT
Start: 2018-08-26 | End: 2018-09-07 | Stop reason: HOSPADM

## 2018-08-25 RX ORDER — IPRATROPIUM BROMIDE AND ALBUTEROL SULFATE 2.5; .5 MG/3ML; MG/3ML
1 SOLUTION RESPIRATORY (INHALATION) 3 TIMES DAILY
Status: DISCONTINUED | OUTPATIENT
Start: 2018-08-25 | End: 2018-09-07 | Stop reason: HOSPADM

## 2018-08-25 RX ORDER — AMIODARONE HYDROCHLORIDE 200 MG/1
200 TABLET ORAL 2 TIMES DAILY
Status: DISCONTINUED | OUTPATIENT
Start: 2018-08-25 | End: 2018-09-07 | Stop reason: HOSPADM

## 2018-08-25 RX ORDER — IPRATROPIUM BROMIDE AND ALBUTEROL SULFATE 2.5; .5 MG/3ML; MG/3ML
SOLUTION RESPIRATORY (INHALATION)
Status: DISPENSED
Start: 2018-08-25 | End: 2018-08-25

## 2018-08-25 RX ORDER — LEVALBUTEROL 1.25 MG/.5ML
1.25 SOLUTION, CONCENTRATE RESPIRATORY (INHALATION) EVERY 4 HOURS PRN
Status: DISCONTINUED | OUTPATIENT
Start: 2018-08-25 | End: 2018-09-07 | Stop reason: HOSPADM

## 2018-08-25 RX ADMIN — IPRATROPIUM BROMIDE AND ALBUTEROL SULFATE 1 AMPULE: .5; 3 SOLUTION RESPIRATORY (INHALATION) at 16:04

## 2018-08-25 RX ADMIN — IPRATROPIUM BROMIDE AND ALBUTEROL SULFATE 1 AMPULE: .5; 3 SOLUTION RESPIRATORY (INHALATION) at 19:54

## 2018-08-25 RX ADMIN — CARBIDOPA AND LEVODOPA 1 TABLET: 25; 100 TABLET, EXTENDED RELEASE ORAL at 13:01

## 2018-08-25 RX ADMIN — FERROUS SULFATE TAB EC 325 MG (65 MG FE EQUIVALENT) 325 MG: 325 (65 FE) TABLET DELAYED RESPONSE at 08:23

## 2018-08-25 RX ADMIN — MICONAZOLE NITRATE: 20.6 POWDER TOPICAL at 20:51

## 2018-08-25 RX ADMIN — HYDROCODONE BITARTRATE AND ACETAMINOPHEN 1 TABLET: 5; 325 TABLET ORAL at 07:34

## 2018-08-25 RX ADMIN — AMIODARONE HYDROCHLORIDE 200 MG: 200 TABLET ORAL at 20:50

## 2018-08-25 RX ADMIN — Medication 2 PUFF: at 19:55

## 2018-08-25 RX ADMIN — MAGNESIUM OXIDE TAB 400 MG (241.3 MG ELEMENTAL MG) 400 MG: 400 (241.3 MG) TAB at 08:23

## 2018-08-25 RX ADMIN — Medication 10 ML: at 20:52

## 2018-08-25 RX ADMIN — MICONAZOLE NITRATE: 20.6 POWDER TOPICAL at 08:26

## 2018-08-25 RX ADMIN — Medication 10 ML: at 08:26

## 2018-08-25 RX ADMIN — ASPIRIN 81 MG: 81 TABLET, COATED ORAL at 08:23

## 2018-08-25 RX ADMIN — METOPROLOL TARTRATE 12.5 MG: 25 TABLET ORAL at 08:22

## 2018-08-25 RX ADMIN — PANTOPRAZOLE SODIUM 40 MG: 40 TABLET, DELAYED RELEASE ORAL at 16:15

## 2018-08-25 RX ADMIN — HYDROCODONE BITARTRATE AND ACETAMINOPHEN 1 TABLET: 5; 325 TABLET ORAL at 16:16

## 2018-08-25 RX ADMIN — CHOLECALCIFEROL CAP 125 MCG (5000 UNIT) 5000 UNITS: 125 CAP at 08:23

## 2018-08-25 RX ADMIN — AMIODARONE HYDROCHLORIDE 200 MG: 200 TABLET ORAL at 08:23

## 2018-08-25 RX ADMIN — ROPINIROLE HYDROCHLORIDE 2 MG: 2 TABLET, FILM COATED ORAL at 13:01

## 2018-08-25 RX ADMIN — CALCITRIOL 0.25 MCG: 0.25 CAPSULE ORAL at 08:23

## 2018-08-25 RX ADMIN — CARBIDOPA AND LEVODOPA 1 TABLET: 25; 100 TABLET, EXTENDED RELEASE ORAL at 16:15

## 2018-08-25 RX ADMIN — IPRATROPIUM BROMIDE AND ALBUTEROL SULFATE 1 AMPULE: .5; 3 SOLUTION RESPIRATORY (INHALATION) at 09:48

## 2018-08-25 RX ADMIN — CONJUGATED ESTROGENS 0.5 G: 0.62 CREAM VAGINAL at 08:26

## 2018-08-25 RX ADMIN — CARBIDOPA AND LEVODOPA 1 TABLET: 25; 100 TABLET, EXTENDED RELEASE ORAL at 08:23

## 2018-08-25 RX ADMIN — RASAGILINE 1 MG: 1 TABLET ORAL at 08:27

## 2018-08-25 RX ADMIN — DILTIAZEM HYDROCHLORIDE 5 MG/HR: 5 INJECTION INTRAVENOUS at 05:07

## 2018-08-25 RX ADMIN — SENNOSIDES 17.2 MG: 8.6 TABLET, FILM COATED ORAL at 08:23

## 2018-08-25 RX ADMIN — ROPINIROLE HYDROCHLORIDE 2 MG: 2 TABLET, FILM COATED ORAL at 20:50

## 2018-08-25 RX ADMIN — FERROUS SULFATE TAB EC 325 MG (65 MG FE EQUIVALENT) 325 MG: 325 (65 FE) TABLET DELAYED RESPONSE at 16:15

## 2018-08-25 RX ADMIN — LINAGLIPTIN 5 MG: 5 TABLET, FILM COATED ORAL at 08:23

## 2018-08-25 RX ADMIN — PANTOPRAZOLE SODIUM 40 MG: 40 TABLET, DELAYED RELEASE ORAL at 08:23

## 2018-08-25 RX ADMIN — ROPINIROLE HYDROCHLORIDE 2 MG: 2 TABLET, FILM COATED ORAL at 08:23

## 2018-08-25 RX ADMIN — CARBIDOPA AND LEVODOPA 1 TABLET: 25; 100 TABLET, EXTENDED RELEASE ORAL at 20:50

## 2018-08-25 RX ADMIN — ATORVASTATIN CALCIUM 20 MG: 20 TABLET, FILM COATED ORAL at 20:50

## 2018-08-25 ASSESSMENT — PAIN DESCRIPTION - PAIN TYPE: TYPE: CHRONIC PAIN

## 2018-08-25 ASSESSMENT — PAIN SCALES - GENERAL
PAINLEVEL_OUTOF10: 10
PAINLEVEL_OUTOF10: 10
PAINLEVEL_OUTOF10: 4
PAINLEVEL_OUTOF10: 5
PAINLEVEL_OUTOF10: 5

## 2018-08-25 ASSESSMENT — PAIN DESCRIPTION - LOCATION: LOCATION: LEG

## 2018-08-25 ASSESSMENT — PAIN DESCRIPTION - ORIENTATION: ORIENTATION: RIGHT;LEFT

## 2018-08-25 NOTE — PROGRESS NOTES
08/22/2018      HEPBSAG:No results found for: HEPBSAG  HEPCAB:  Lab Results   Component Value Date    HEPCAB NONREACTIVE 08/21/2018     MPO ANCA:   Lab Results   Component Value Date    MPO 10 08/21/2018    . PR3 ANCA:    Lab Results   Component Value Date    PR3 7 08/21/2018     URINE SODIUM:    Lab Results   Component Value Date    EDWARD 71 08/22/2018      URINE CREATININE:    Lab Results   Component Value Date    LABCREA 78.1 08/22/2018     URINE PROTEIN:    Lab Results   Component Value Date    TPU 49 08/22/2018    TPU 49 08/22/2018     URINALYSIS:  U/A:   Lab Results   Component Value Date    NITRU NEGATIVE 08/23/2018    COLORU YELLOW 08/23/2018    PHUR 5.5 08/23/2018    WBCUA 5 TO 10 08/23/2018    RBCUA 50  08/23/2018    MUCUS NOT REPORTED 08/23/2018    TRICHOMONAS NOT REPORTED 08/23/2018    YEAST NOT REPORTED 08/23/2018    BACTERIA MODERATE 08/23/2018    SPECGRAV 1.020 08/23/2018    LEUKOCYTESUR TRACE 08/23/2018    UROBILINOGEN Normal 08/23/2018    BILIRUBINUR NEGATIVE 08/23/2018    GLUCOSEU NEGATIVE 08/23/2018    KETUA NEGATIVE 08/23/2018    AMORPHOUS NOT REPORTED 08/23/2018         ASSESSMENT      1. KRISTIN :Most likely secondary to acute tubular interstitial nephritis. Allergic interstitial nephritis secondary to Bactrim is definitely inserted in the differential.  2.  Hypertension: Blood pressures are definitely better after hydration. She is currently off Levophed  3. Baseline CK D stage III with a creatinine of 1.2-1.3 range Secondary to diabetic nephrosclerosis, follows up with Dr Radha Welsh. Urine protein creatinine ratio 0.50.7  4. History of urinary tract infections  5. Hyperkalemia secondary to HPI, recent Bactrim use. Potassium is much better  6. Mild volume overload, resolved with IV Lasix    PLAN      1. Calcium mildly elevated, decrease calcitriol to daily  2. Startedoral diuretics per home dose  3. Follow renal fxn  4. Okay to move the patient out of ICU to stepdown.   5.  Follow-up

## 2018-08-25 NOTE — PROGRESS NOTES
[facility]    Progress Note    8/25/2018    9:35 AM    Name:   Darryle Buggy  MRN:     [id number]     Acct:      [de-identified]   Room:   [address] [place] [address] Day:  5  Admit Date:  8/20/2018  1:21 AM    PCP:   Alex Davey DO  Code Status:  Full Code    Subjective:     C/C:   Chief Complaint   Patient presents with    Emesis    Nausea     Interval History Status: not changed. She had recurrent rapid atrial fibrillation overnight requiring Cardizem drip. Denies any chest pain, nausea or vomiting, fevers or chills. Intermittent shortness of breath    Brief History: This is 72-year-old white female who is noted with a complaint of nausea, vomiting and diarrhea and found have acute kidney injury and hypotension.  She is admitted into the intensive care unit and placed on IV fluids and Levophed drip.  Nephrology consultation is requested and workup is in progress. She has recently been on Bactrim as an outpatient for urinary tract infection.     On the evening of August 22, 2018 she developed shortness of breath and respiratory distress requiring BiPAP placement.  Chest x-ray August 23 showing pulmonary edema.  IV fluids were discontinued     On the evening of August 23 round 6:30 PM she developed rapid atrial fibrillation and was placed on Cardizem drip as well as heparin drip. Shots are cardiac enzymes which are negative. Echocardiogram has been ordered.   Throughout the night she converted to sinus rhythm and Cardizem drip was weaned off    Review of Systems:     Constitutional:  negative for chills, fevers, sweats  Respiratory:  negative for cough, dyspnea on exertion, shortness of breath, wheezing  Cardiovascular:  negative for chest pain, chest pressure/discomfort, lower extremity edema, palpitations  Gastrointestinal:  negative for abdominal pain, constipation, diarrhea, nausea, vomiting  Neurological:  negative for dizziness, headache    Medications: Allergies: Allergies   Allergen Reactions    Dye [Barium-Containing Compounds] Other (See Comments)     Cause Afib per     Pcn [Penicillins] Itching and Swelling    Bactrim [Sulfamethoxazole-Trimethoprim] Other (See Comments)     Allergic Nephritis    Red Dye Itching and Rash     This allergy is likely incorrect:  Per patient's  she has no issue with medications that have red dye in them or red food.  He thinks this reaction was added to chart when the pt had a colonoscopy (possibly barium or iodine dye instead)       Current Meds:   Scheduled Meds:    ipratropium-albuterol  1 ampule Inhalation TID    calcitRIOL  0.25 mcg Oral BID    amiodarone  200 mg Oral Daily    aspirin  81 mg Oral Daily    atorvastatin  20 mg Oral Nightly    mometasone-formoterol  2 puff Inhalation BID    carbidopa-levodopa  1 tablet Oral 4x Daily    conjugated estrogens  0.5 g Vaginal Every Other Day    ferrous sulfate  325 mg Oral BID WC    metoprolol tartrate  12.5 mg Oral BID    pantoprazole  40 mg Oral BID AC    rasagiline mesylate  1 tablet Oral Daily    rOPINIRole  2 mg Oral TID    senna  2 tablet Oral Daily    vitamin D  5,000 Units Oral Daily    sodium chloride flush  10 mL Intravenous 2 times per day    insulin lispro  0-12 Units Subcutaneous TID WC    insulin lispro  0-6 Units Subcutaneous Nightly    linagliptin  5 mg Oral Daily    sodium chloride flush  10 mL Intravenous 2 times per day    miconazole   Topical BID    magnesium oxide  400 mg Oral Daily     Continuous Infusions:    diltiazem (CARDIZEM) 125 mg in dextrose 5% 125 mL infusion Stopped (08/25/18 0813)    dextrose      norepinephrine Stopped (08/24/18 0647)     PRN Meds: metoprolol, acetaminophen, ALPRAZolam, HYDROcodone-acetaminophen, polyethylene glycol, sodium chloride flush, glucose, dextrose, glucagon (rDNA), dextrose, ondansetron **OR** ondansetron, sodium chloride flush, albuterol, data filed at 08/25/18 0836   Gross per 24 hour   Intake            323.2 ml   Output             1650 ml   Net          -1326.8 ml       Labs:    Hematology:  Recent Labs      08/23/18 1953 08/24/18 0436 08/25/18   0523   WBC  10.7  9.8  7.9   RBC  2.63*  2.75*  2.38*   HGB  9.1*  9.1*  8.0*   HCT  27.5*  28.7*  24.7*   MCV  104.4*  104.4*  104.0*   MCH  34.4*  33.2  33.7   MCHC  32.9  31.8  32.5   RDW  15.1*  14.8*  15.6*   PLT  104*  134  117*   MPV  8.5  7.7  7.8   SEDRATE   --   79*   --      Chemistry:  Recent Labs      08/23/18 0617 08/23/18 1953 08/24/18 0212 08/24/18 0436 08/25/18   0523   NA  140   --    --   140  138   K  4.8   --    --   4.5  4.4   CL  104   --    --   102  101   CO2  24   --    --   26  23   GLUCOSE  116*   --    --   103*  114*   BUN  40*   --    --   41*  42*   CREATININE  3.71*   --    --   3.00*  2.73*   MG  2.2   --    --   2.1  1.8   ANIONGAP  12   --    --   12  14   LABGLOM  12*   --    --   15*  17*   GFRAA  15*   --    --   19*  21*   CALCIUM  9.4   --    --   9.5  9.9   CAION  1.36*   --    --   1.39*  1.41*   TROPONINT   --   <0.03  <0.03  <0.03   --      Recent Labs      08/23/18 1653 08/23/18   2040 08/24/18   0436  08/24/18   0850  08/24/18   1115  08/24/18   1700  08/24/18 2024   URICACID   --    --   8.6*   --    --    --    --    POCGLU  111*  99   --   86  147*  122*  111*         Lab Results   Component Value Date/Time    SPECIAL LT HAND 7ML 08/20/2018 03:45 AM     Lab Results   Component Value Date/Time    CULTURE NO GROWTH 5 DAYS 08/20/2018 03:45 AM       Lab Results   Component Value Date    POCPH 7.36 06/02/2013    POCPCO2 55 06/02/2013    POCPO2 63 06/02/2013    POCHCO3 31.0 06/02/2013    NBEA NOT REPORTED 06/02/2013    PBEA 6 06/02/2013    DWY7BDH 33 06/02/2013    DLHA1BLR 90 06/02/2013    FIO2 45.0 01/15/2017       Radiology:    No new radiology reports    Physical Examination:        General appearance:  alert, cooperative and no distress  Mental Status:  oriented to person, place and time and normal affect  Lungs:  clear to auscultation bilaterally, normal effort, Diminished bilaterally  Heart:  regular rate and rhythm, no murmur  Abdomen:  soft, nontender, nondistended, normal bowel sounds, no masses, hepatomegaly, splenomegaly  Extremities:  no edema, redness, tenderness in the calves  Skin:  no gross lesions, rashes, induration, bruising right upper extremity    Assessment:        Primary Problem  Acute kidney injury superimposed on chronic kidney disease [facility])    Active Hospital Problems    Diagnosis Date Noted    Thrombocytopenia ([facility] [D69.6] 08/25/2018    Hypovolemic shock ([facility] [R57.1] 08/24/2018    Acute hypoxemic respiratory failure (HCC) [J96.01] 08/23/2018    Rapid atrial fibrillation (HCC) [I48.91] 08/23/2018    SIRS (systemic inflammatory response syndrome) (HCC) [R65.10] 08/20/2018    Nausea and vomiting in adult [R11.2] 08/20/2018    Acute kidney injury superimposed on chronic kidney disease ([facility] [N17.9, N18.9] 08/20/2018    Bacterial UTI [N39.0, A49.9] 08/20/2018    Dehydration [E86.0]     Anemia of chronic renal failure, stage 4 (severe) ([facility] [N18.4, D63.1] 04/25/2018    CKD stage 3 due to type 2 diabetes mellitus ([facility] [F52.96, N18.3] 03/08/2018    ECTOR on CPAP [G47.33, Z99.89]     Chronic atrial fibrillation ([facility] [I48.2] 05/31/2013    Essential hypertension [I10] 06/07/2012    Controlled type 2 diabetes mellitus with stage 3 chronic kidney disease, without long-term current use of insulin (HCC) [E11.22, N18.3]        Plan:        1. Cardizem drip for heart rate control, back to sinus rhythm today therefore triple be weaned at this time  2. Cardiology consultation  3. Monitor and control blood pressure  4. Glycemic control, continue insulin scale  5. Hold heparin due to bruising and thrombocytopenia  6. Fluids per nephrology  7. Repeat chest x-ray due to continued dyspnea  8.  Change to Xopenex due to

## 2018-08-25 NOTE — PROGRESS NOTES
DATE: 2018    NAME: Ana Rucker  MRN: [id number]   : 1945    Patient not seen this date for Physical Therapy due to:  [] Blood transfusion in progress  [] Cancel by RN  [] Hemodialysis  [x]  Refusal by Patient   [] Spine Precautions   [] Strict Bedrest  [] Surgery  [] Testing      [x] Other Pt having issues with A-fib this morning       [] PT being discontinued at this time. Patient independent. No further needs. [] PT being discontinued at this time as the patient has been transferred to hospice care. No further needs.     Radha Garcia, PTA

## 2018-08-25 NOTE — CONSULTS
Covering for Dr Daiana Rose  73 y/o female admitted to the hospital with nausea. Pt was found to have acute on chronic renal failure 2nd to Bactrim use. Pt has experienced multiple episodes of P Afib with RVR  Converting spontaneously to NSR after HR is better controlled. Pt denies any CP. She does complain of worsening dyspnea. PMHx  P A fib  COPD  HTN  GERD  Sleep apnea  Parkinson's disease  Renal insuff  Anemia  H/O GI bleed when anticoagulated    Meds  Please see list on chart    Allergy  PCN  Red dye    SHx  Remote h/o tob abuse    FHx  Non contributory    ROS  As above    On PE  Mildly dyspneic, anxious NAD  NSR  BP is ok  No JVD  Lungs diffuse insp and exp wheezing  CV RRR  MIRELLA  No peripheral edema    Anemic  Elevated creat  EKG  NSR 1st degree AV block  NSIVCD  Echo normal LV function with mild AS    A/p  1.  P Afib  2nd to COPD  Not an anticoagulation candidate. Will increase amiodarone temporarily while in mild COPD exacerbation   Plan on decreasing dose upon discharge.     2.  HTN  Adequately controlled

## 2018-08-26 LAB
ABSOLUTE EOS #: 0.2 K/UL (ref 0–0.4)
ABSOLUTE IMMATURE GRANULOCYTE: ABNORMAL K/UL (ref 0–0.3)
ABSOLUTE LYMPH #: 0.7 K/UL (ref 1–4.8)
ABSOLUTE MONO #: 0.6 K/UL (ref 0.2–0.8)
ANION GAP SERPL CALCULATED.3IONS-SCNC: 11 MMOL/L (ref 9–17)
BASOPHILS # BLD: 0 % (ref 0–2)
BASOPHILS ABSOLUTE: 0 K/UL (ref 0–0.2)
BUN BLDV-MCNC: 36 MG/DL (ref 8–23)
BUN/CREAT BLD: 17 (ref 9–20)
CALCIUM IONIZED: 1.4 MMOL/L (ref 1.13–1.33)
CALCIUM SERPL-MCNC: 9.8 MG/DL (ref 8.6–10.4)
CHLORIDE BLD-SCNC: 102 MMOL/L (ref 98–107)
CO2: 29 MMOL/L (ref 20–31)
CREAT SERPL-MCNC: 2.14 MG/DL (ref 0.5–0.9)
CULTURE: NORMAL
CULTURE: NORMAL
DIFFERENTIAL TYPE: ABNORMAL
EOSINOPHILS RELATIVE PERCENT: 3 % (ref 1–4)
GFR AFRICAN AMERICAN: 28 ML/MIN
GFR NON-AFRICAN AMERICAN: 23 ML/MIN
GFR SERPL CREATININE-BSD FRML MDRD: ABNORMAL ML/MIN/{1.73_M2}
GFR SERPL CREATININE-BSD FRML MDRD: ABNORMAL ML/MIN/{1.73_M2}
GLUCOSE BLD-MCNC: 107 MG/DL (ref 65–105)
GLUCOSE BLD-MCNC: 139 MG/DL (ref 70–99)
GLUCOSE BLD-MCNC: 164 MG/DL (ref 65–105)
GLUCOSE BLD-MCNC: 179 MG/DL (ref 65–105)
HCT VFR BLD CALC: 24.3 % (ref 36–46)
HEMOGLOBIN: 8.1 G/DL (ref 12–16)
IMMATURE GRANULOCYTES: ABNORMAL %
LYMPHOCYTES # BLD: 10 % (ref 24–44)
Lab: NORMAL
Lab: NORMAL
MAGNESIUM: 1.6 MG/DL (ref 1.6–2.6)
MCH RBC QN AUTO: 34.4 PG (ref 26–34)
MCHC RBC AUTO-ENTMCNC: 33.3 G/DL (ref 31–37)
MCV RBC AUTO: 103.4 FL (ref 80–100)
MONOCYTES # BLD: 9 % (ref 1–7)
NRBC AUTOMATED: ABNORMAL PER 100 WBC
PDW BLD-RTO: 14.5 % (ref 11.5–14.5)
PLATELET # BLD: 151 K/UL (ref 130–400)
PLATELET ESTIMATE: ABNORMAL
PMV BLD AUTO: ABNORMAL FL (ref 6–12)
POTASSIUM SERPL-SCNC: 4.4 MMOL/L (ref 3.7–5.3)
RBC # BLD: 2.35 M/UL (ref 4–5.2)
RBC # BLD: ABNORMAL 10*6/UL
SEG NEUTROPHILS: 78 % (ref 36–66)
SEGMENTED NEUTROPHILS ABSOLUTE COUNT: 5.6 K/UL (ref 1.8–7.7)
SODIUM BLD-SCNC: 142 MMOL/L (ref 135–144)
SPECIMEN DESCRIPTION: NORMAL
SPECIMEN DESCRIPTION: NORMAL
STATUS: NORMAL
STATUS: NORMAL
WBC # BLD: 7.1 K/UL (ref 3.5–11)
WBC # BLD: ABNORMAL 10*3/UL

## 2018-08-26 PROCEDURE — 82947 ASSAY GLUCOSE BLOOD QUANT: CPT

## 2018-08-26 PROCEDURE — 83735 ASSAY OF MAGNESIUM: CPT

## 2018-08-26 PROCEDURE — 2500000003 HC RX 250 WO HCPCS: Performed by: INTERNAL MEDICINE

## 2018-08-26 PROCEDURE — 2580000003 HC RX 258: Performed by: INTERNAL MEDICINE

## 2018-08-26 PROCEDURE — 2700000000 HC OXYGEN THERAPY PER DAY

## 2018-08-26 PROCEDURE — 6370000000 HC RX 637 (ALT 250 FOR IP): Performed by: INTERNAL MEDICINE

## 2018-08-26 PROCEDURE — 94640 AIRWAY INHALATION TREATMENT: CPT

## 2018-08-26 PROCEDURE — 85025 COMPLETE CBC W/AUTO DIFF WBC: CPT

## 2018-08-26 PROCEDURE — 6370000000 HC RX 637 (ALT 250 FOR IP): Performed by: NURSE PRACTITIONER

## 2018-08-26 PROCEDURE — 36415 COLL VENOUS BLD VENIPUNCTURE: CPT

## 2018-08-26 PROCEDURE — 2580000003 HC RX 258: Performed by: RADIOLOGY

## 2018-08-26 PROCEDURE — 2060000000 HC ICU INTERMEDIATE R&B

## 2018-08-26 PROCEDURE — 82330 ASSAY OF CALCIUM: CPT

## 2018-08-26 PROCEDURE — 99232 SBSQ HOSP IP/OBS MODERATE 35: CPT | Performed by: INTERNAL MEDICINE

## 2018-08-26 PROCEDURE — 80048 BASIC METABOLIC PNL TOTAL CA: CPT

## 2018-08-26 RX ADMIN — CHOLECALCIFEROL CAP 125 MCG (5000 UNIT) 5000 UNITS: 125 CAP at 07:46

## 2018-08-26 RX ADMIN — CALCITRIOL 0.25 MCG: 0.25 CAPSULE ORAL at 07:47

## 2018-08-26 RX ADMIN — MAGNESIUM OXIDE TAB 400 MG (241.3 MG ELEMENTAL MG) 400 MG: 400 (241.3 MG) TAB at 07:46

## 2018-08-26 RX ADMIN — HYDROCODONE BITARTRATE AND ACETAMINOPHEN 1 TABLET: 5; 325 TABLET ORAL at 21:15

## 2018-08-26 RX ADMIN — AMIODARONE HYDROCHLORIDE 200 MG: 200 TABLET ORAL at 21:16

## 2018-08-26 RX ADMIN — CARBIDOPA AND LEVODOPA 1 TABLET: 25; 100 TABLET, EXTENDED RELEASE ORAL at 21:16

## 2018-08-26 RX ADMIN — MICONAZOLE NITRATE: 20.6 POWDER TOPICAL at 07:47

## 2018-08-26 RX ADMIN — ROPINIROLE HYDROCHLORIDE 2 MG: 2 TABLET, FILM COATED ORAL at 21:16

## 2018-08-26 RX ADMIN — DILTIAZEM HYDROCHLORIDE 10 MG/HR: 5 INJECTION INTRAVENOUS at 09:22

## 2018-08-26 RX ADMIN — SENNOSIDES 17.2 MG: 8.6 TABLET, FILM COATED ORAL at 07:46

## 2018-08-26 RX ADMIN — CARBIDOPA AND LEVODOPA 1 TABLET: 25; 100 TABLET, EXTENDED RELEASE ORAL at 07:47

## 2018-08-26 RX ADMIN — IPRATROPIUM BROMIDE AND ALBUTEROL SULFATE 1 AMPULE: .5; 3 SOLUTION RESPIRATORY (INHALATION) at 10:01

## 2018-08-26 RX ADMIN — MICONAZOLE NITRATE: 20.6 POWDER TOPICAL at 21:48

## 2018-08-26 RX ADMIN — PANTOPRAZOLE SODIUM 40 MG: 40 TABLET, DELAYED RELEASE ORAL at 17:54

## 2018-08-26 RX ADMIN — FERROUS SULFATE TAB EC 325 MG (65 MG FE EQUIVALENT) 325 MG: 325 (65 FE) TABLET DELAYED RESPONSE at 17:54

## 2018-08-26 RX ADMIN — ROPINIROLE HYDROCHLORIDE 2 MG: 2 TABLET, FILM COATED ORAL at 07:47

## 2018-08-26 RX ADMIN — ROPINIROLE HYDROCHLORIDE 2 MG: 2 TABLET, FILM COATED ORAL at 15:23

## 2018-08-26 RX ADMIN — Medication 2 PUFF: at 20:16

## 2018-08-26 RX ADMIN — CARBIDOPA AND LEVODOPA 1 TABLET: 25; 100 TABLET, EXTENDED RELEASE ORAL at 12:31

## 2018-08-26 RX ADMIN — ALPRAZOLAM 0.25 MG: 0.25 TABLET ORAL at 07:47

## 2018-08-26 RX ADMIN — RASAGILINE 1 MG: 1 TABLET ORAL at 07:48

## 2018-08-26 RX ADMIN — IPRATROPIUM BROMIDE AND ALBUTEROL SULFATE 1 AMPULE: .5; 3 SOLUTION RESPIRATORY (INHALATION) at 20:15

## 2018-08-26 RX ADMIN — LINAGLIPTIN 5 MG: 5 TABLET, FILM COATED ORAL at 07:47

## 2018-08-26 RX ADMIN — INSULIN LISPRO 2 UNITS: 100 INJECTION, SOLUTION INTRAVENOUS; SUBCUTANEOUS at 12:31

## 2018-08-26 RX ADMIN — INSULIN LISPRO 1 UNITS: 100 INJECTION, SOLUTION INTRAVENOUS; SUBCUTANEOUS at 21:15

## 2018-08-26 RX ADMIN — FERROUS SULFATE TAB EC 325 MG (65 MG FE EQUIVALENT) 325 MG: 325 (65 FE) TABLET DELAYED RESPONSE at 07:47

## 2018-08-26 RX ADMIN — ACETAMINOPHEN 650 MG: 325 TABLET ORAL at 07:46

## 2018-08-26 RX ADMIN — ATORVASTATIN CALCIUM 20 MG: 20 TABLET, FILM COATED ORAL at 21:16

## 2018-08-26 RX ADMIN — ASPIRIN 81 MG: 81 TABLET, COATED ORAL at 07:46

## 2018-08-26 RX ADMIN — Medication 10 ML: at 21:48

## 2018-08-26 RX ADMIN — PANTOPRAZOLE SODIUM 40 MG: 40 TABLET, DELAYED RELEASE ORAL at 07:46

## 2018-08-26 RX ADMIN — CARBIDOPA AND LEVODOPA 1 TABLET: 25; 100 TABLET, EXTENDED RELEASE ORAL at 17:54

## 2018-08-26 RX ADMIN — Medication 2 PUFF: at 10:01

## 2018-08-26 RX ADMIN — AMIODARONE HYDROCHLORIDE 200 MG: 200 TABLET ORAL at 07:47

## 2018-08-26 ASSESSMENT — PAIN SCALES - GENERAL
PAINLEVEL_OUTOF10: 5
PAINLEVEL_OUTOF10: 2
PAINLEVEL_OUTOF10: 0
PAINLEVEL_OUTOF10: 3
PAINLEVEL_OUTOF10: 0
PAINLEVEL_OUTOF10: 0

## 2018-08-26 NOTE — PROGRESS NOTES
Patient reporting she had 2 sets of glasses . Unable to locate second pair of glasses. ICU called and report no glasses left in room and unsure if patient had 2 sets on admission.

## 2018-08-26 NOTE — PLAN OF CARE
[facility]    Second Visit Note  For more detailed information please refer to the progress note of the day      8/26/2018    4:31 PM    Name:   Cinda Tillman  MRN:     [id number]     Acct:      [de-identified]   Room:   2032/2032-01  IP Day:  6  Admit Date:  8/20/2018  1:21 AM    PCP:   Arlin Schafer DO  Code Status:  Full Code        Pt vitals were reviewed   New labs were reviewed   Patient was seen    Updated plan :     1. Patient resting comfortably. Continue fluids per nephrology.   2. Transfer to [facility]  8/26/2018  4:31 PM

## 2018-08-26 NOTE — PROGRESS NOTES
flush 0.9 % injection 10 mL 2 times per day   sodium chloride flush 0.9 % injection 10 mL PRN   miconazole (MICOTIN) 2 % powder BID   promethazine (PHENERGAN) injection 12.5 mg Q6H PRN   magnesium oxide (MAG-OX) tablet 400 mg Daily         LABS      CBC:   Recent Labs      08/24/18   0436  08/25/18   0523  08/26/18   0552   WBC  9.8  7.9  7.1   RBC  2.75*  2.38*  2.35*   HGB  9.1*  8.0*  8.1*   HCT  28.7*  24.7*  24.3*   MCV  104.4*  104.0*  103.4*   MCH  33.2  33.7  34.4*   MCHC  31.8  32.5  33.3   RDW  14.8*  15.6*  14.5   PLT  134  117*  151   MPV  7.7  7.8  NOT REPORTED      BMP:   Recent Labs      08/24/18   0436 08/25/18   0523  08/26/18   0552   NA  140  138  142   K  4.5  4.4  4.4   CL  102  101  102   CO2  26  23  29   BUN  41*  42*  36*   CREATININE  3.00*  2.73*  2.14*   GLUCOSE  103*  114*  139*   CALCIUM  9.5  9.9  9.8      MAGNESIUM:   Recent Labs      08/24/18 0436 08/25/18   0523  08/26/18   0552   MG  2.1  1.8  1.6     ALBUMIN:   No results for input(s): LABALBU in the last 72 hours. IRON:    Lab Results   Component Value Date    IRON 76 07/05/2018     IRON SATURATION:    Lab Results   Component Value Date    LABIRON 25 07/05/2018     TIBC:    Lab Results   Component Value Date    TIBC 304 07/05/2018     FERRITIN:    Lab Results   Component Value Date    FERRITIN 598 07/05/2018     RADHA:   Lab Results   Component Value Date    RADHA NEGATIVE 08/21/2018       SPEP:   Lab Results   Component Value Date    PROT 6.7 08/20/2018    PATH ELECTRONICALLY SIGNED. Simon Jones M.D. 08/22/2018     UPEP:   Lab Results   Component Value Date    TPU 49 08/22/2018    TPU 49 08/22/2018      HEPBSAG:No results found for: HEPBSAG  HEPCAB:  Lab Results   Component Value Date    HEPCAB NONREACTIVE 08/21/2018     MPO ANCA:   Lab Results   Component Value Date    MPO 10 08/21/2018    .   PR3 ANCA:    Lab Results   Component Value Date    PR3 7 08/21/2018     URINE SODIUM:    Lab Results   Component Value Date

## 2018-08-26 NOTE — PROGRESS NOTES
[facility]    Progress Note    8/26/2018    8:50 AM    Name:   Benjie Hough  MRN:     [id number]     Acct:      [de-identified]   Room:   [address] [place] [address] Day:  6  Admit Date:  8/20/2018  1:21 AM    PCP:   Wendy Shaw DO  Code Status:  Full Code    Subjective:     C/C:   Chief Complaint   Patient presents with    Emesis    Nausea     Interval History Status: not changed. Nausea and vomiting resolved. Patient had recurrent episodes of rapid A. fib overnight with some nausea and shortness of breath. Denies any chest pain, fevers or chills, lightheadedness or dizziness or other complaints. Brief History: This is 62-year-old white female who is noted with a complaint of nausea, vomiting and diarrhea and found have acute kidney injury and hypotension.  She is admitted into the intensive care unit and placed on IV fluids and Levophed drip.  Nephrology consultation is requested and workup is in progress. She has recently been on Bactrim as an outpatient for urinary tract infection.     On the evening of August 22, 2018 she developed shortness of breath and respiratory distress requiring BiPAP placement.  Chest x-ray August 23 showing pulmonary edema.  IV fluids were discontinued     On the evening of August 23 round 6:30 PM she developed rapid atrial fibrillation and was placed on Cardizem drip as well as heparin drip.  Shots are cardiac enzymes which are negative.  Echocardiogram has been ordered.  Throughout the night she converted to sinus rhythm and Cardizem drip was weaned off.   She's had recurrent episodes of atrial fibrillation with rapid response and cardiology has increased her amiodarone to 200 mg twice a day    Review of Systems:     Constitutional:  negative for chills, fevers, sweats  Respiratory:  negative for cough, dyspnea on exertion, shortness of breath, wheezing  Cardiovascular:  negative for chest pain, chest pressure/discomfort, lower extremity edema, palpitations  Gastrointestinal:  negative for abdominal pain, constipation, diarrhea, nausea, vomiting  Neurological:  negative for dizziness, headache    Medications: Allergies: Allergies   Allergen Reactions    Dye [Barium-Containing Compounds] Other (See Comments)     Cause Afib per     Pcn [Penicillins] Itching and Swelling    Bactrim [Sulfamethoxazole-Trimethoprim] Other (See Comments)     Allergic Nephritis    Red Dye Itching and Rash     This allergy is likely incorrect:  Per patient's  she has no issue with medications that have red dye in them or red food.  He thinks this reaction was added to chart when the pt had a colonoscopy (possibly barium or iodine dye instead)       Current Meds:   Scheduled Meds:    ipratropium-albuterol  1 ampule Inhalation TID    calcitRIOL  0.25 mcg Oral Daily    amiodarone  200 mg Oral BID    aspirin  81 mg Oral Daily    atorvastatin  20 mg Oral Nightly    mometasone-formoterol  2 puff Inhalation BID    carbidopa-levodopa  1 tablet Oral 4x Daily    conjugated estrogens  0.5 g Vaginal Every Other Day    ferrous sulfate  325 mg Oral BID WC    pantoprazole  40 mg Oral BID AC    rasagiline mesylate  1 tablet Oral Daily    rOPINIRole  2 mg Oral TID    senna  2 tablet Oral Daily    vitamin D  5,000 Units Oral Daily    sodium chloride flush  10 mL Intravenous 2 times per day    insulin lispro  0-12 Units Subcutaneous TID WC    insulin lispro  0-6 Units Subcutaneous Nightly    linagliptin  5 mg Oral Daily    sodium chloride flush  10 mL Intravenous 2 times per day    miconazole   Topical BID    magnesium oxide  400 mg Oral Daily     Continuous Infusions:    diltiazem (CARDIZEM) 125 mg in dextrose 5% 125 mL infusion Stopped (08/26/18 0830)    dextrose      norepinephrine Stopped (08/24/18 0647)     PRN Meds: levalbuterol, metoprolol, acetaminophen, ALPRAZolam, HYDROcodone-acetaminophen, control blood pressure  5. Glycemic control  6. Aerosols as ordered, Xopenex as needed in place of albuterol due to recurrent tachycardia  7. PT and OT  8.  Transfer to kevin Wilson DO  8/26/2018  8:50 AM

## 2018-08-26 NOTE — PROGRESS NOTES
Up in a chair    Feeling better  Less dyspneic  A fib last night that was treated with IV cardizem  BP is ok  Lungs have better air exchange than yesterday with exp rhonchi  RRR  No peripheral edema    I would have bolused the the pt with Amiodarone had I been called  Continue Amiodarone 200 mg bid.    Ok to transfer to [facility]

## 2018-08-26 NOTE — PLAN OF CARE
I was asked to stop by and see the patient because she flipped back into afib in the 130's 140's. The Cardizem was already restarted at 5mg/hr. Before I got to the unit. Her heart rate is ranging from 110 to 120's. The patient is symptomatic with nausea and a c/o vague pressure in her chest. The Cardizem cant be titrated up again per protocol for about 15min. I told the nurse to just bolus Cardizem 5mg from the bag now and then increase it when its time.

## 2018-08-27 PROBLEM — E83.42 HYPOMAGNESEMIA: Status: ACTIVE | Noted: 2018-08-27

## 2018-08-27 LAB
ABSOLUTE EOS #: 0.17 K/UL (ref 0–0.4)
ABSOLUTE IMMATURE GRANULOCYTE: ABNORMAL K/UL (ref 0–0.3)
ABSOLUTE LYMPH #: 0.86 K/UL (ref 1–4.8)
ABSOLUTE MONO #: 0.57 K/UL (ref 0.2–0.8)
ANION GAP SERPL CALCULATED.3IONS-SCNC: 10 MMOL/L (ref 9–17)
BASOPHILS # BLD: 0 %
BASOPHILS ABSOLUTE: 0 K/UL (ref 0–0.2)
BUN BLDV-MCNC: 32 MG/DL (ref 8–23)
BUN/CREAT BLD: 16 (ref 9–20)
CALCIUM IONIZED: 1.33 MMOL/L (ref 1.13–1.33)
CALCIUM SERPL-MCNC: 9.6 MG/DL (ref 8.6–10.4)
CHLORIDE BLD-SCNC: 104 MMOL/L (ref 98–107)
CO2: 29 MMOL/L (ref 20–31)
CREAT SERPL-MCNC: 2 MG/DL (ref 0.5–0.9)
DIFFERENTIAL TYPE: ABNORMAL
EOSINOPHILS RELATIVE PERCENT: 3 % (ref 1–4)
GFR AFRICAN AMERICAN: 30 ML/MIN
GFR NON-AFRICAN AMERICAN: 25 ML/MIN
GFR SERPL CREATININE-BSD FRML MDRD: ABNORMAL ML/MIN/{1.73_M2}
GFR SERPL CREATININE-BSD FRML MDRD: ABNORMAL ML/MIN/{1.73_M2}
GLUCOSE BLD-MCNC: 110 MG/DL (ref 65–105)
GLUCOSE BLD-MCNC: 121 MG/DL (ref 65–105)
GLUCOSE BLD-MCNC: 151 MG/DL (ref 70–99)
GLUCOSE BLD-MCNC: 184 MG/DL (ref 65–105)
HCT VFR BLD CALC: 23.3 % (ref 36–46)
HEMOGLOBIN: 7.4 G/DL (ref 12–16)
IMMATURE GRANULOCYTES: ABNORMAL %
LYMPHOCYTES # BLD: 15 % (ref 24–44)
MAGNESIUM: 1.4 MG/DL (ref 1.6–2.6)
MAGNESIUM: 2.1 MG/DL (ref 1.6–2.6)
MCH RBC QN AUTO: 32.8 PG (ref 26–34)
MCHC RBC AUTO-ENTMCNC: 31.6 G/DL (ref 31–37)
MCV RBC AUTO: 103.9 FL (ref 80–100)
MONOCYTES # BLD: 10 % (ref 1–7)
MORPHOLOGY: ABNORMAL
NRBC AUTOMATED: ABNORMAL PER 100 WBC
PDW BLD-RTO: 15.1 % (ref 11.5–14.5)
PLATELET # BLD: 159 K/UL (ref 130–400)
PLATELET ESTIMATE: ABNORMAL
PMV BLD AUTO: 7.5 FL (ref 6–12)
POTASSIUM SERPL-SCNC: 4.4 MMOL/L (ref 3.7–5.3)
RBC # BLD: 2.25 M/UL (ref 4–5.2)
RBC # BLD: ABNORMAL 10*6/UL
SEG NEUTROPHILS: 72 % (ref 36–66)
SEGMENTED NEUTROPHILS ABSOLUTE COUNT: 4.1 K/UL (ref 1.8–7.7)
SODIUM BLD-SCNC: 143 MMOL/L (ref 135–144)
WBC # BLD: 5.7 K/UL (ref 3.5–11)
WBC # BLD: ABNORMAL 10*3/UL

## 2018-08-27 PROCEDURE — 83735 ASSAY OF MAGNESIUM: CPT

## 2018-08-27 PROCEDURE — 82330 ASSAY OF CALCIUM: CPT

## 2018-08-27 PROCEDURE — 94640 AIRWAY INHALATION TREATMENT: CPT

## 2018-08-27 PROCEDURE — 2580000003 HC RX 258: Performed by: RADIOLOGY

## 2018-08-27 PROCEDURE — 80048 BASIC METABOLIC PNL TOTAL CA: CPT

## 2018-08-27 PROCEDURE — 82947 ASSAY GLUCOSE BLOOD QUANT: CPT

## 2018-08-27 PROCEDURE — 6360000002 HC RX W HCPCS: Performed by: INTERNAL MEDICINE

## 2018-08-27 PROCEDURE — 6360000002 HC RX W HCPCS: Performed by: NURSE PRACTITIONER

## 2018-08-27 PROCEDURE — 6370000000 HC RX 637 (ALT 250 FOR IP): Performed by: INTERNAL MEDICINE

## 2018-08-27 PROCEDURE — 36415 COLL VENOUS BLD VENIPUNCTURE: CPT

## 2018-08-27 PROCEDURE — 2700000000 HC OXYGEN THERAPY PER DAY

## 2018-08-27 PROCEDURE — 2580000003 HC RX 258: Performed by: INTERNAL MEDICINE

## 2018-08-27 PROCEDURE — 6370000000 HC RX 637 (ALT 250 FOR IP): Performed by: NURSE PRACTITIONER

## 2018-08-27 PROCEDURE — 99232 SBSQ HOSP IP/OBS MODERATE 35: CPT | Performed by: INTERNAL MEDICINE

## 2018-08-27 PROCEDURE — 2580000003 HC RX 258: Performed by: NURSE PRACTITIONER

## 2018-08-27 PROCEDURE — 2060000000 HC ICU INTERMEDIATE R&B

## 2018-08-27 PROCEDURE — 85025 COMPLETE CBC W/AUTO DIFF WBC: CPT

## 2018-08-27 RX ORDER — MAGNESIUM SULFATE 1 G/100ML
1 INJECTION INTRAVENOUS
Status: COMPLETED | OUTPATIENT
Start: 2018-08-27 | End: 2018-08-27

## 2018-08-27 RX ORDER — MORPHINE SULFATE 2 MG/ML
1 INJECTION, SOLUTION INTRAMUSCULAR; INTRAVENOUS ONCE
Status: COMPLETED | OUTPATIENT
Start: 2018-08-27 | End: 2018-08-27

## 2018-08-27 RX ADMIN — MAGNESIUM OXIDE TAB 400 MG (241.3 MG ELEMENTAL MG) 400 MG: 400 (241.3 MG) TAB at 08:35

## 2018-08-27 RX ADMIN — CARBIDOPA AND LEVODOPA 1 TABLET: 25; 100 TABLET, EXTENDED RELEASE ORAL at 08:34

## 2018-08-27 RX ADMIN — MAGNESIUM SULFATE HEPTAHYDRATE 1 G: 1 INJECTION, SOLUTION INTRAVENOUS at 10:30

## 2018-08-27 RX ADMIN — RASAGILINE 1 MG: 1 TABLET ORAL at 09:13

## 2018-08-27 RX ADMIN — CARBIDOPA AND LEVODOPA 1 TABLET: 25; 100 TABLET, EXTENDED RELEASE ORAL at 12:29

## 2018-08-27 RX ADMIN — MORPHINE SULFATE 1 MG: 2 INJECTION, SOLUTION INTRAMUSCULAR; INTRAVENOUS at 17:54

## 2018-08-27 RX ADMIN — Medication 10 ML: at 22:00

## 2018-08-27 RX ADMIN — MAGNESIUM SULFATE HEPTAHYDRATE 1 G: 1 INJECTION, SOLUTION INTRAVENOUS at 08:35

## 2018-08-27 RX ADMIN — FERROUS SULFATE TAB EC 325 MG (65 MG FE EQUIVALENT) 325 MG: 325 (65 FE) TABLET DELAYED RESPONSE at 08:35

## 2018-08-27 RX ADMIN — MAGNESIUM SULFATE HEPTAHYDRATE 1 G: 1 INJECTION, SOLUTION INTRAVENOUS at 09:34

## 2018-08-27 RX ADMIN — HYDROCODONE BITARTRATE AND ACETAMINOPHEN 1 TABLET: 5; 325 TABLET ORAL at 17:21

## 2018-08-27 RX ADMIN — Medication 10 ML: at 21:36

## 2018-08-27 RX ADMIN — IPRATROPIUM BROMIDE AND ALBUTEROL SULFATE 1 AMPULE: .5; 3 SOLUTION RESPIRATORY (INHALATION) at 15:03

## 2018-08-27 RX ADMIN — PANTOPRAZOLE SODIUM 40 MG: 40 TABLET, DELAYED RELEASE ORAL at 06:22

## 2018-08-27 RX ADMIN — CALCITRIOL 0.25 MCG: 0.25 CAPSULE ORAL at 08:34

## 2018-08-27 RX ADMIN — FERROUS SULFATE TAB EC 325 MG (65 MG FE EQUIVALENT) 325 MG: 325 (65 FE) TABLET DELAYED RESPONSE at 17:21

## 2018-08-27 RX ADMIN — IPRATROPIUM BROMIDE AND ALBUTEROL SULFATE 1 AMPULE: .5; 3 SOLUTION RESPIRATORY (INHALATION) at 20:09

## 2018-08-27 RX ADMIN — AMIODARONE HYDROCHLORIDE 150 MG: 50 INJECTION, SOLUTION INTRAVENOUS at 10:23

## 2018-08-27 RX ADMIN — ALPRAZOLAM 0.25 MG: 0.25 TABLET ORAL at 20:10

## 2018-08-27 RX ADMIN — AMIODARONE HYDROCHLORIDE 200 MG: 200 TABLET ORAL at 08:35

## 2018-08-27 RX ADMIN — PROMETHAZINE HYDROCHLORIDE 12.5 MG: 25 INJECTION INTRAMUSCULAR; INTRAVENOUS at 13:08

## 2018-08-27 RX ADMIN — CHOLECALCIFEROL CAP 125 MCG (5000 UNIT) 5000 UNITS: 125 CAP at 08:35

## 2018-08-27 RX ADMIN — AMIODARONE HYDROCHLORIDE 150 MG: 50 INJECTION, SOLUTION INTRAVENOUS at 11:33

## 2018-08-27 RX ADMIN — AMIODARONE HYDROCHLORIDE 200 MG: 200 TABLET ORAL at 20:11

## 2018-08-27 RX ADMIN — Medication 10 ML: at 08:35

## 2018-08-27 RX ADMIN — MICONAZOLE NITRATE: 20.6 POWDER TOPICAL at 08:38

## 2018-08-27 RX ADMIN — ONDANSETRON HYDROCHLORIDE 4 MG: 2 INJECTION, SOLUTION INTRAMUSCULAR; INTRAVENOUS at 09:51

## 2018-08-27 RX ADMIN — HYDROCODONE BITARTRATE AND ACETAMINOPHEN 1 TABLET: 5; 325 TABLET ORAL at 10:20

## 2018-08-27 RX ADMIN — HYDROCODONE BITARTRATE AND ACETAMINOPHEN 1 TABLET: 5; 325 TABLET ORAL at 20:10

## 2018-08-27 RX ADMIN — PANTOPRAZOLE SODIUM 40 MG: 40 TABLET, DELAYED RELEASE ORAL at 14:58

## 2018-08-27 RX ADMIN — ONDANSETRON HYDROCHLORIDE 4 MG: 2 INJECTION, SOLUTION INTRAMUSCULAR; INTRAVENOUS at 17:27

## 2018-08-27 RX ADMIN — AMIODARONE HYDROCHLORIDE 1 MG/MIN: 50 INJECTION, SOLUTION INTRAVENOUS at 11:48

## 2018-08-27 RX ADMIN — ROPINIROLE HYDROCHLORIDE 2 MG: 2 TABLET, FILM COATED ORAL at 14:58

## 2018-08-27 RX ADMIN — CARBIDOPA AND LEVODOPA 1 TABLET: 25; 100 TABLET, EXTENDED RELEASE ORAL at 17:21

## 2018-08-27 RX ADMIN — ASPIRIN 81 MG: 81 TABLET, COATED ORAL at 08:35

## 2018-08-27 RX ADMIN — Medication 2 PUFF: at 20:09

## 2018-08-27 RX ADMIN — INSULIN LISPRO 2 UNITS: 100 INJECTION, SOLUTION INTRAVENOUS; SUBCUTANEOUS at 12:29

## 2018-08-27 RX ADMIN — LINAGLIPTIN 5 MG: 5 TABLET, FILM COATED ORAL at 08:35

## 2018-08-27 RX ADMIN — ACETAMINOPHEN 650 MG: 325 TABLET ORAL at 06:26

## 2018-08-27 RX ADMIN — MICONAZOLE NITRATE: 20.6 POWDER TOPICAL at 21:36

## 2018-08-27 RX ADMIN — CONJUGATED ESTROGENS 0.5 G: 0.62 CREAM VAGINAL at 08:38

## 2018-08-27 RX ADMIN — ROPINIROLE HYDROCHLORIDE 2 MG: 2 TABLET, FILM COATED ORAL at 20:11

## 2018-08-27 RX ADMIN — SENNOSIDES 17.2 MG: 8.6 TABLET, FILM COATED ORAL at 08:35

## 2018-08-27 RX ADMIN — ATORVASTATIN CALCIUM 20 MG: 20 TABLET, FILM COATED ORAL at 20:11

## 2018-08-27 RX ADMIN — CARBIDOPA AND LEVODOPA 1 TABLET: 25; 100 TABLET, EXTENDED RELEASE ORAL at 20:10

## 2018-08-27 RX ADMIN — ROPINIROLE HYDROCHLORIDE 2 MG: 2 TABLET, FILM COATED ORAL at 08:35

## 2018-08-27 ASSESSMENT — PAIN SCALES - GENERAL
PAINLEVEL_OUTOF10: 10
PAINLEVEL_OUTOF10: 4
PAINLEVEL_OUTOF10: 10
PAINLEVEL_OUTOF10: 5
PAINLEVEL_OUTOF10: 5
PAINLEVEL_OUTOF10: 10

## 2018-08-27 ASSESSMENT — PAIN DESCRIPTION - LOCATION
LOCATION: LEG
LOCATION: GENERALIZED
LOCATION: GENERALIZED

## 2018-08-27 ASSESSMENT — PAIN DESCRIPTION - PAIN TYPE
TYPE: CHRONIC PAIN

## 2018-08-27 ASSESSMENT — PAIN DESCRIPTION - ORIENTATION: ORIENTATION: OTHER (COMMENT)

## 2018-08-27 NOTE — PROGRESS NOTES
[facility] Veras, [facility] Sepsis ([facility] [A41.9] . Vitals:    08/27/18 1600   BP: (!) 122/44   Pulse: 74   Resp: 16   Temp: 97.2 °F (36.2 °C)   SpO2: 95%   . Patients home meds are   Prior to Admission medications    Medication Sig Start Date End Date Taking? Authorizing Provider   furosemide (LASIX) 20 MG tablet Take 40 mg by mouth daily Takes 2 tabs (=40mg) daily   Yes Historical Provider, MD   SITagliptin-metFORMIN (JANUMET XR)  MG TB24 per extended release tablet Take 2 tablets by mouth daily   Yes Historical Provider, MD   atorvastatin (LIPITOR) 20 MG tablet TAKE 1 TABLET DAILY 7/20/18  Yes Lesly Hill,    spironolactone (ALDACTONE) 50 MG tablet Take 1 tablet by mouth daily 7/17/18  Yes Lesly Hill DO   ALPRAZolam (XANAX) 0.25 MG tablet Take 1 tablet by mouth 3 times daily as needed for Anxiety for up to 90 days. . 7/6/18 10/4/18 Yes Lesly Hill DO   butalbital-acetaminophen-caffeine (FIORICET, ESGIC) -40 MG per tablet Take 1 tablet by mouth every 4 hours as needed for Headaches 6/7/18  Yes Jose HO Blood, DO   nystatin (MYCOSTATIN) 329799 UNIT/GM cream Apply topically 2 times daily.  6/7/18  Yes Jose HO Blood, DO   carbidopa-levodopa (SINEMET CR)  MG per extended release tablet Take 1 tablet by mouth 4 times daily   Yes Historical Provider, MD   calcium citrate (CALCITRATE) 250 MG TABS tablet Take 250 mg by mouth 3 times daily   Yes Historical Provider, MD   magnesium oxide (MAG-OX) 400 MG tablet Take 400 mg by mouth daily   Yes Historical Provider, MD   polyethylene glycol (GLYCOLAX) packet Take 17 g by mouth daily as needed for Constipation   Yes Historical Provider, MD   senna (SENOKOT) 8.6 MG tablet Take 2 tablets by mouth daily   Yes Historical Provider, MD   conjugated estrogens (PREMARIN) 0.625 MG/GM vaginal cream Place 0.5 g vaginally every other day 4/4/18  Yes Elena Gastelum MD   metoprolol tartrate (LOPRESSOR) 25 MG tablet Take 0.5 tablets by

## 2018-08-27 NOTE — CARE COORDINATION
Received call from [place] with [facility]. MMO has denied precert on 9/80 for [facility], stating pt too unstable for [facility]. Physician can schedule peer to peer p ) [phone].   Case reference # [id number]

## 2018-08-27 NOTE — PROGRESS NOTES
mL PRN   glucose (GLUTOSE) 40 % oral gel 15 g PRN   dextrose 50 % solution 12.5 g PRN   glucagon (rDNA) injection 1 mg PRN   dextrose 5 % solution PRN   insulin lispro (HUMALOG) injection vial 0-12 Units TID WC   insulin lispro (HUMALOG) injection vial 0-6 Units Nightly   linagliptin (TRADJENTA) tablet 5 mg Daily   ondansetron (ZOFRAN-ODT) disintegrating tablet 4 mg Q6H PRN   Or    ondansetron (ZOFRAN) injection 4 mg Q6H PRN   sodium chloride flush 0.9 % injection 10 mL 2 times per day   sodium chloride flush 0.9 % injection 10 mL PRN   miconazole (MICOTIN) 2 % powder BID   promethazine (PHENERGAN) injection 12.5 mg Q6H PRN   magnesium oxide (MAG-OX) tablet 400 mg Daily         LABS      CBC:   Recent Labs      08/25/18 0523 08/26/18 0552 08/27/18 0458   WBC  7.9  7.1  5.7   RBC  2.38*  2.35*  2.25*   HGB  8.0*  8.1*  7.4*   HCT  24.7*  24.3*  23.3*   MCV  104.0*  103.4*  103.9*   MCH  33.7  34.4*  32.8   MCHC  32.5  33.3  31.6   RDW  15.6*  14.5  15.1*   PLT  117*  151  159   MPV  7.8  NOT REPORTED  7.5      BMP:   Recent Labs      08/25/18 0523 08/26/18 0552  08/27/18 0458   NA  138  142  143   K  4.4  4.4  4.4   CL  101  102  104   CO2  23  29  29   BUN  42*  36*  32*   CREATININE  2.73*  2.14*  2.00*   GLUCOSE  114*  139*  151*   CALCIUM  9.9  9.8  9.6      MAGNESIUM:   Recent Labs      08/25/18 0523 08/26/18 0552  08/27/18   0458   MG  1.8  1.6  1.4*     ALBUMIN:   No results for input(s): LABALBU in the last 72 hours. IRON:    Lab Results   Component Value Date    IRON 76 07/05/2018     IRON SATURATION:    Lab Results   Component Value Date    LABIRON 25 07/05/2018     TIBC:    Lab Results   Component Value Date    TIBC 304 07/05/2018     FERRITIN:    Lab Results   Component Value Date    FERRITIN 598 07/05/2018     RADHA:   Lab Results   Component Value Date    RADHA NEGATIVE 08/21/2018       SPEP:   Lab Results   Component Value Date    PROT 6.7 08/20/2018    PATH ELECTRONICALLY SIGNED.

## 2018-08-27 NOTE — PROGRESS NOTES
Acute on chronic diastolic congestive heart failure ([address] Anesthesia complication; Asthma; Atrial fibrillation ([facility] Cataracts, bilateral; Cellulitis; Cerebral artery occlusion with cerebral infarction ([facility] Chronic kidney disease; Constipation; COPD (chronic obstructive pulmonary disease) ([facility] Difficult intravenous access; Diverticulosis; DM2 (diabetes mellitus, type 2) ([facility] Full dentures; GERD (gastroesophageal reflux disease); Headache(784.0); [place] (hard of hearing); Hyperlipidemia; Hyperplastic polyp of intestine; Hypertension; Impaired ambulation; Kidney stone; Morbid obesity with BMI of 40.0-44.9, adult ([facility] ECTOR on CPAP; Osteoarthritis; Parkinson disease ([facility] Restless leg syndrome; Spinal stenosis in cervical region; Type II or unspecified type diabetes mellitus without mention of complication, not stated as uncontrolled; Unspecified sleep apnea; Urethral caruncle; and Wears glasses. Social History:   reports that she quit smoking about 47 years ago. Her smoking use included Cigarettes. She has a 30.00 pack-year smoking history. She has never used smokeless tobacco. She reports that she drinks about 1.2 oz of alcohol per week . She reports that she does not use drugs. Family History:   Family History   Problem Relation Age of Onset    Heart Failure Mother     Hypertension Mother     Heart Disease Mother     High Blood Pressure Mother        Vitals:  BP (!) 148/81   Pulse 81   Temp 97.9 °F (36.6 °C) (Temporal)   Resp 18   Ht 5' 1\" (1.549 m)   Wt 192 lb 11.2 oz (87.4 kg)   LMP 2004 (Within Years)   SpO2 94%   BMI 36.41 kg/m²   Temp (24hrs), Av.8 °F (36.6 °C), Min:97.2 °F (36.2 °C), Max:98.4 °F (36.9 °C)    Recent Labs      18   1149  18   1721  18   2056  18   0754   POCGLU  179*  107*  164*  121*       I/O (24Hr):     Intake/Output Summary (Last 24 hours) at 18 1006  Last data filed at 18 0600   Gross per 24 hour   Intake                0 ml   Output             1250 ml   Net            -1250 ml       Labs:    Hematology:  Recent Labs      08/25/18 0523 08/26/18   0552  08/27/18   0458   WBC  7.9  7.1  5.7   RBC  2.38*  2.35*  2.25*   HGB  8.0*  8.1*  7.4*   HCT  24.7*  24.3*  23.3*   MCV  104.0*  103.4*  103.9*   MCH  33.7  34.4*  32.8   MCHC  32.5  33.3  31.6   RDW  15.6*  14.5  15.1*   PLT  117*  151  159   MPV  7.8  NOT REPORTED  7.5     Chemistry:  Recent Labs      08/25/18 0523 08/26/18 0552  08/27/18 0458   NA  138  142  143   K  4.4  4.4  4.4   CL  101  102  104   CO2  23  29  29   GLUCOSE  114*  139*  151*   BUN  42*  36*  32*   CREATININE  2.73*  2.14*  2.00*   MG  1.8  1.6  1.4*   ANIONGAP  14  11  10   LABGLOM  17*  23*  25*   GFRAA  21*  28*  30*   CALCIUM  9.9  9.8  9.6   CAION  1.41*  1.40*  1.33     Recent Labs      08/25/18   1455  08/25/18   1636  08/25/18   2037  08/26/18   1149  08/26/18   1721  08/26/18   2056  08/27/18   0754   TSH  2.58   --    --    --    --    --    --    POCGLU   --   126*  120*  179*  107*  164*  121*         Lab Results   Component Value Date/Time    SPECIAL LT HAND 7ML 08/20/2018 03:45 AM     Lab Results   Component Value Date/Time    CULTURE NO GROWTH 6 DAYS 08/20/2018 03:45 AM       Lab Results   Component Value Date    POCPH 7.36 06/02/2013    POCPCO2 55 06/02/2013    POCPO2 63 06/02/2013    POCHCO3 31.0 06/02/2013    NBEA NOT REPORTED 06/02/2013    PBEA 6 06/02/2013    NCL2POP 33 06/02/2013    MCNW6HNV 90 06/02/2013    FIO2 45.0 01/15/2017       Radiology:    Nothing new      Physical Examination:        General appearance:  alert, cooperative and no distress  Mental Status:  oriented to person, place and time and normal affect  Lungs:  clear to auscultation bilaterally, normal effort  Heart:  Tachy, irreg irreg rhythm, no murmur  Abdomen:  soft, nontender, nondistended, normal bowel sounds, no masses, hepatomegaly, splenomegaly; obese  Extremities:  No redness, tenderness in the calves; mild edema  Skin:  no gross lesions, rashes, induration    Assessment:        Primary Problem  Acute kidney injury superimposed on chronic kidney disease [facility])    Active Hospital Problems    Diagnosis Date Noted    Hypomagnesemia [E83.42] 08/27/2018    Thrombocytopenia ([address] [D69.6] 08/25/2018    Hypovolemic shock ([address] [R57.1] 08/24/2018    Acute hypoxemic respiratory failure (HCC) [J96.01] 08/23/2018    Rapid atrial fibrillation (HCC) [I48.91] 08/23/2018    SIRS (systemic inflammatory response syndrome) (HCC) [R65.10] 08/20/2018    Nausea and vomiting in adult [R11.2] 08/20/2018    Acute kidney injury superimposed on chronic kidney disease ([address] [N17.9, N18.9] 08/20/2018    Bacterial UTI [N39.0, A49.9] 08/20/2018    Dehydration [E86.0]     Anemia of chronic renal failure, stage 4 (severe) ([facility] [facility] [N18.4, D63.1] 04/25/2018    CKD stage 3 due to type 2 diabetes mellitus ([address] [K82.00, N18.3] 03/08/2018    ECTOR on CPAP [G47.33, Z99.89]     Chronic atrial fibrillation ([facility] [address] [I48.2] 05/31/2013    Essential hypertension [I10] 06/07/2012    Controlled type 2 diabetes mellitus with stage 3 chronic kidney disease, without long-term current use of insulin ([address] [E11.22, N18.3]        Plan:        1. Hr control-iv amio bolus now; cardio contacted  2. Replace mg  3. D/w   4.  Monitor renal function    Omar Perez DO  8/27/2018  10:06 AM

## 2018-08-27 NOTE — PROGRESS NOTES
data:     CBC:   Recent Labs      08/25/18   0523  08/26/18   0552  08/27/18   0458   WBC  7.9  7.1  5.7   HGB  8.0*  8.1*  7.4*   PLT  117*  151  159     BMP:  Recent Labs      08/25/18   0523  08/26/18   0552  08/27/18   0458   NA  138  142  143   K  4.4  4.4  4.4   CL  101  102  104   CO2  23  29  29   BUN  42*  36*  32*   CREATININE  2.73*  2.14*  2.00*   GLUCOSE  114*  139*  151*       Medications:   Scheduled Meds:   ipratropium-albuterol  1 ampule Inhalation TID    calcitRIOL  0.25 mcg Oral Daily    amiodarone  200 mg Oral BID    aspirin  81 mg Oral Daily    atorvastatin  20 mg Oral Nightly    mometasone-formoterol  2 puff Inhalation BID    carbidopa-levodopa  1 tablet Oral 4x Daily    conjugated estrogens  0.5 g Vaginal Every Other Day    ferrous sulfate  325 mg Oral BID WC    pantoprazole  40 mg Oral BID AC    rasagiline mesylate  1 tablet Oral Daily    rOPINIRole  2 mg Oral TID    senna  2 tablet Oral Daily    vitamin D  5,000 Units Oral Daily    sodium chloride flush  10 mL Intravenous 2 times per day    insulin lispro  0-12 Units Subcutaneous TID WC    insulin lispro  0-6 Units Subcutaneous Nightly    linagliptin  5 mg Oral Daily    sodium chloride flush  10 mL Intravenous 2 times per day    miconazole   Topical BID    magnesium oxide  400 mg Oral Daily     Continuous Infusions:   amiodarone 450mg/250ml D5W infusion 1 mg/min (08/27/18 1148)    Followed by   Ollie Mejia amiodarone 450mg/250ml D5W infusion      diltiazem (CARDIZEM) 125 mg in dextrose 5% 125 mL infusion Stopped (08/26/18 1029)    dextrose           Assessment/ Plan :   Paroxysmal atrial fibrillation  Chronic obstructive pulmonary disease. Hypertension  CKD -  III  Hypomagnesemia. Continue IV amiodarone and run the current bag out. Recheck magnesium level and supplement with sliding scale. Will follow patient with you. Thank you very much for allowing us to participate in the care of this patient.   Please call us with

## 2018-08-28 ENCOUNTER — APPOINTMENT (OUTPATIENT)
Dept: CT IMAGING | Age: 73
DRG: 871 | End: 2018-08-28
Payer: COMMERCIAL

## 2018-08-28 ENCOUNTER — APPOINTMENT (OUTPATIENT)
Dept: GENERAL RADIOLOGY | Age: 73
DRG: 871 | End: 2018-08-28
Payer: COMMERCIAL

## 2018-08-28 PROBLEM — R13.10 ODYNOPHAGIA: Status: ACTIVE | Noted: 2018-08-28

## 2018-08-28 LAB
ABSOLUTE EOS #: 0.1 K/UL (ref 0–0.4)
ABSOLUTE IMMATURE GRANULOCYTE: ABNORMAL K/UL (ref 0–0.3)
ABSOLUTE LYMPH #: 0.9 K/UL (ref 1–4.8)
ABSOLUTE MONO #: 0.8 K/UL (ref 0.2–0.8)
ANION GAP SERPL CALCULATED.3IONS-SCNC: 14 MMOL/L (ref 9–17)
BASOPHILS # BLD: 1 % (ref 0–2)
BASOPHILS ABSOLUTE: 0.1 K/UL (ref 0–0.2)
BUN BLDV-MCNC: 26 MG/DL (ref 8–23)
BUN/CREAT BLD: 16 (ref 9–20)
CALCIUM IONIZED: 1.28 MMOL/L (ref 1.13–1.33)
CALCIUM SERPL-MCNC: 9.5 MG/DL (ref 8.6–10.4)
CHLORIDE BLD-SCNC: 99 MMOL/L (ref 98–107)
CO2: 28 MMOL/L (ref 20–31)
CREAT SERPL-MCNC: 1.66 MG/DL (ref 0.5–0.9)
DIFFERENTIAL TYPE: ABNORMAL
EOSINOPHILS RELATIVE PERCENT: 1 % (ref 1–4)
GFR AFRICAN AMERICAN: 37 ML/MIN
GFR NON-AFRICAN AMERICAN: 30 ML/MIN
GFR SERPL CREATININE-BSD FRML MDRD: ABNORMAL ML/MIN/{1.73_M2}
GFR SERPL CREATININE-BSD FRML MDRD: ABNORMAL ML/MIN/{1.73_M2}
GLUCOSE BLD-MCNC: 115 MG/DL (ref 65–105)
GLUCOSE BLD-MCNC: 153 MG/DL (ref 65–105)
GLUCOSE BLD-MCNC: 154 MG/DL (ref 65–105)
GLUCOSE BLD-MCNC: 155 MG/DL (ref 70–99)
GLUCOSE BLD-MCNC: 159 MG/DL (ref 65–105)
GLUCOSE BLD-MCNC: 205 MG/DL (ref 65–105)
HCT VFR BLD CALC: 26.2 % (ref 36–46)
HEMOGLOBIN: 8.5 G/DL (ref 12–16)
IMMATURE GRANULOCYTES: ABNORMAL %
LYMPHOCYTES # BLD: 12 % (ref 24–44)
MAGNESIUM: 2 MG/DL (ref 1.6–2.6)
MCH RBC QN AUTO: 33.6 PG (ref 26–34)
MCHC RBC AUTO-ENTMCNC: 32.3 G/DL (ref 31–37)
MCV RBC AUTO: 103.9 FL (ref 80–100)
MONOCYTES # BLD: 9 % (ref 1–7)
NRBC AUTOMATED: ABNORMAL PER 100 WBC
PDW BLD-RTO: 15.2 % (ref 11.5–14.5)
PLATELET # BLD: 194 K/UL (ref 130–400)
PLATELET ESTIMATE: ABNORMAL
PMV BLD AUTO: 7.7 FL (ref 6–12)
POTASSIUM SERPL-SCNC: 4.5 MMOL/L (ref 3.7–5.3)
PROCALCITONIN: 0.16 NG/ML
RBC # BLD: 2.52 M/UL (ref 4–5.2)
RBC # BLD: ABNORMAL 10*6/UL
SEG NEUTROPHILS: 77 % (ref 36–66)
SEGMENTED NEUTROPHILS ABSOLUTE COUNT: 6.2 K/UL (ref 1.8–7.7)
SODIUM BLD-SCNC: 141 MMOL/L (ref 135–144)
WBC # BLD: 8 K/UL (ref 3.5–11)
WBC # BLD: ABNORMAL 10*3/UL

## 2018-08-28 PROCEDURE — 83735 ASSAY OF MAGNESIUM: CPT

## 2018-08-28 PROCEDURE — 6360000002 HC RX W HCPCS: Performed by: NURSE PRACTITIONER

## 2018-08-28 PROCEDURE — 74018 RADEX ABDOMEN 1 VIEW: CPT

## 2018-08-28 PROCEDURE — 85025 COMPLETE CBC W/AUTO DIFF WBC: CPT

## 2018-08-28 PROCEDURE — 31720 CLEARANCE OF AIRWAYS: CPT

## 2018-08-28 PROCEDURE — 99233 SBSQ HOSP IP/OBS HIGH 50: CPT | Performed by: INTERNAL MEDICINE

## 2018-08-28 PROCEDURE — 71045 X-RAY EXAM CHEST 1 VIEW: CPT

## 2018-08-28 PROCEDURE — 6360000002 HC RX W HCPCS: Performed by: INTERNAL MEDICINE

## 2018-08-28 PROCEDURE — 2580000003 HC RX 258: Performed by: RADIOLOGY

## 2018-08-28 PROCEDURE — 2500000003 HC RX 250 WO HCPCS: Performed by: INTERNAL MEDICINE

## 2018-08-28 PROCEDURE — 84145 PROCALCITONIN (PCT): CPT

## 2018-08-28 PROCEDURE — 92611 MOTION FLUOROSCOPY/SWALLOW: CPT

## 2018-08-28 PROCEDURE — 6360000004 HC RX CONTRAST MEDICATION: Performed by: INTERNAL MEDICINE

## 2018-08-28 PROCEDURE — 2580000003 HC RX 258: Performed by: NURSE PRACTITIONER

## 2018-08-28 PROCEDURE — 36415 COLL VENOUS BLD VENIPUNCTURE: CPT

## 2018-08-28 PROCEDURE — G8997 SWALLOW GOAL STATUS: HCPCS

## 2018-08-28 PROCEDURE — 80048 BASIC METABOLIC PNL TOTAL CA: CPT

## 2018-08-28 PROCEDURE — 71250 CT THORAX DX C-: CPT

## 2018-08-28 PROCEDURE — 74220 X-RAY XM ESOPHAGUS 1CNTRST: CPT

## 2018-08-28 PROCEDURE — 51702 INSERT TEMP BLADDER CATH: CPT

## 2018-08-28 PROCEDURE — 2060000000 HC ICU INTERMEDIATE R&B

## 2018-08-28 PROCEDURE — 6370000000 HC RX 637 (ALT 250 FOR IP): Performed by: INTERNAL MEDICINE

## 2018-08-28 PROCEDURE — 82330 ASSAY OF CALCIUM: CPT

## 2018-08-28 PROCEDURE — 99254 IP/OBS CNSLTJ NEW/EST MOD 60: CPT | Performed by: INTERNAL MEDICINE

## 2018-08-28 PROCEDURE — 6370000000 HC RX 637 (ALT 250 FOR IP): Performed by: NURSE PRACTITIONER

## 2018-08-28 PROCEDURE — 74230 X-RAY XM SWLNG FUNCJ C+: CPT

## 2018-08-28 PROCEDURE — 2700000000 HC OXYGEN THERAPY PER DAY

## 2018-08-28 PROCEDURE — G8996 SWALLOW CURRENT STATUS: HCPCS

## 2018-08-28 PROCEDURE — 94761 N-INVAS EAR/PLS OXIMETRY MLT: CPT

## 2018-08-28 PROCEDURE — 94640 AIRWAY INHALATION TREATMENT: CPT

## 2018-08-28 PROCEDURE — 94660 CPAP INITIATION&MGMT: CPT

## 2018-08-28 RX ORDER — SITAGLIPTIN AND METFORMIN HYDROCHLORIDE 1000; 50 MG/1; MG/1
TABLET, FILM COATED ORAL
Qty: 180 TABLET | Refills: 1 | Status: SHIPPED | OUTPATIENT
Start: 2018-08-28 | End: 2018-09-07 | Stop reason: HOSPADM

## 2018-08-28 RX ORDER — FUROSEMIDE 10 MG/ML
40 INJECTION INTRAMUSCULAR; INTRAVENOUS EVERY 12 HOURS
Status: DISCONTINUED | OUTPATIENT
Start: 2018-08-28 | End: 2018-08-30

## 2018-08-28 RX ORDER — FUROSEMIDE 10 MG/ML
20 INJECTION INTRAMUSCULAR; INTRAVENOUS ONCE
Status: COMPLETED | OUTPATIENT
Start: 2018-08-28 | End: 2018-08-28

## 2018-08-28 RX ORDER — LEVOFLOXACIN 5 MG/ML
500 INJECTION, SOLUTION INTRAVENOUS ONCE
Status: COMPLETED | OUTPATIENT
Start: 2018-08-28 | End: 2018-08-28

## 2018-08-28 RX ORDER — FUROSEMIDE 10 MG/ML
40 INJECTION INTRAMUSCULAR; INTRAVENOUS ONCE
Status: DISCONTINUED | OUTPATIENT
Start: 2018-08-28 | End: 2018-08-28

## 2018-08-28 RX ORDER — CLINDAMYCIN PHOSPHATE 600 MG/50ML
600 INJECTION INTRAVENOUS EVERY 8 HOURS
Status: DISCONTINUED | OUTPATIENT
Start: 2018-08-28 | End: 2018-09-02

## 2018-08-28 RX ORDER — AMIODARONE HYDROCHLORIDE 50 MG/ML
300 INJECTION, SOLUTION INTRAVENOUS ONCE
Status: DISCONTINUED | OUTPATIENT
Start: 2018-08-28 | End: 2018-08-28

## 2018-08-28 RX ORDER — LEVOFLOXACIN 5 MG/ML
250 INJECTION, SOLUTION INTRAVENOUS EVERY 24 HOURS
Status: DISCONTINUED | OUTPATIENT
Start: 2018-08-29 | End: 2018-09-01

## 2018-08-28 RX ORDER — LORAZEPAM 2 MG/ML
0.5 INJECTION INTRAMUSCULAR ONCE
Status: COMPLETED | OUTPATIENT
Start: 2018-08-28 | End: 2018-08-28

## 2018-08-28 RX ORDER — METOPROLOL SUCCINATE 25 MG/1
25 TABLET, EXTENDED RELEASE ORAL DAILY
Status: DISCONTINUED | OUTPATIENT
Start: 2018-08-28 | End: 2018-09-07 | Stop reason: HOSPADM

## 2018-08-28 RX ORDER — METHYLPREDNISOLONE SODIUM SUCCINATE 40 MG/ML
40 INJECTION, POWDER, LYOPHILIZED, FOR SOLUTION INTRAMUSCULAR; INTRAVENOUS EVERY 6 HOURS
Status: DISCONTINUED | OUTPATIENT
Start: 2018-08-28 | End: 2018-08-29

## 2018-08-28 RX ADMIN — IPRATROPIUM BROMIDE AND ALBUTEROL SULFATE 1 AMPULE: .5; 3 SOLUTION RESPIRATORY (INHALATION) at 09:17

## 2018-08-28 RX ADMIN — LEVALBUTEROL 1.25 MG: 1.25 SOLUTION, CONCENTRATE RESPIRATORY (INHALATION) at 02:22

## 2018-08-28 RX ADMIN — Medication 2 PUFF: at 09:17

## 2018-08-28 RX ADMIN — Medication 10 ML: at 21:28

## 2018-08-28 RX ADMIN — MICONAZOLE NITRATE: 20.6 POWDER TOPICAL at 21:27

## 2018-08-28 RX ADMIN — DEXTROSE MONOHYDRATE 150 MG: 50 INJECTION, SOLUTION INTRAVENOUS at 03:34

## 2018-08-28 RX ADMIN — CLINDAMYCIN PHOSPHATE 600 MG: 600 INJECTION, SOLUTION INTRAVENOUS at 16:23

## 2018-08-28 RX ADMIN — DARBEPOETIN ALFA 100 MCG: 100 INJECTION, SOLUTION INTRAVENOUS; SUBCUTANEOUS at 21:26

## 2018-08-28 RX ADMIN — FUROSEMIDE 20 MG: 10 INJECTION, SOLUTION INTRAVENOUS at 03:27

## 2018-08-28 RX ADMIN — INSULIN LISPRO 2 UNITS: 100 INJECTION, SOLUTION INTRAVENOUS; SUBCUTANEOUS at 12:29

## 2018-08-28 RX ADMIN — MICONAZOLE NITRATE: 20.6 POWDER TOPICAL at 12:23

## 2018-08-28 RX ADMIN — IPRATROPIUM BROMIDE AND ALBUTEROL SULFATE 1 AMPULE: .5; 3 SOLUTION RESPIRATORY (INHALATION) at 20:20

## 2018-08-28 RX ADMIN — METHYLPREDNISOLONE SODIUM SUCCINATE 40 MG: 40 INJECTION, POWDER, FOR SOLUTION INTRAMUSCULAR; INTRAVENOUS at 16:23

## 2018-08-28 RX ADMIN — METHYLPREDNISOLONE SODIUM SUCCINATE 40 MG: 40 INJECTION, POWDER, FOR SOLUTION INTRAMUSCULAR; INTRAVENOUS at 21:24

## 2018-08-28 RX ADMIN — LORAZEPAM 0.5 MG: 2 INJECTION, SOLUTION INTRAMUSCULAR; INTRAVENOUS at 21:26

## 2018-08-28 RX ADMIN — HYDROCODONE BITARTRATE AND ACETAMINOPHEN 1 TABLET: 5; 325 TABLET ORAL at 02:00

## 2018-08-28 RX ADMIN — DIATRIZOATE MEGLUMINE AND DIATRIZOATE SODIUM 30 ML: 660; 100 LIQUID ORAL; RECTAL at 11:03

## 2018-08-28 RX ADMIN — METHYLPREDNISOLONE SODIUM SUCCINATE 40 MG: 40 INJECTION, POWDER, FOR SOLUTION INTRAMUSCULAR; INTRAVENOUS at 08:54

## 2018-08-28 RX ADMIN — FUROSEMIDE 20 MG: 10 INJECTION, SOLUTION INTRAVENOUS at 02:00

## 2018-08-28 RX ADMIN — CLINDAMYCIN PHOSPHATE 600 MG: 600 INJECTION, SOLUTION INTRAVENOUS at 08:53

## 2018-08-28 RX ADMIN — INSULIN LISPRO 4 UNITS: 100 INJECTION, SOLUTION INTRAVENOUS; SUBCUTANEOUS at 17:11

## 2018-08-28 RX ADMIN — FUROSEMIDE 20 MG: 10 INJECTION, SOLUTION INTRAMUSCULAR; INTRAVENOUS at 16:23

## 2018-08-28 RX ADMIN — LEVOFLOXACIN 500 MG: 5 INJECTION, SOLUTION INTRAVENOUS at 17:11

## 2018-08-28 RX ADMIN — Medication 10 ML: at 12:23

## 2018-08-28 RX ADMIN — ALPRAZOLAM 0.25 MG: 0.25 TABLET ORAL at 02:00

## 2018-08-28 RX ADMIN — ONDANSETRON HYDROCHLORIDE 4 MG: 2 INJECTION, SOLUTION INTRAMUSCULAR; INTRAVENOUS at 03:28

## 2018-08-28 RX ADMIN — FUROSEMIDE 40 MG: 10 INJECTION, SOLUTION INTRAMUSCULAR; INTRAVENOUS at 23:21

## 2018-08-28 RX ADMIN — Medication 10 ML: at 12:24

## 2018-08-28 ASSESSMENT — PAIN DESCRIPTION - LOCATION
LOCATION: GENERALIZED
LOCATION: GENERALIZED

## 2018-08-28 ASSESSMENT — PAIN DESCRIPTION - PAIN TYPE
TYPE: CHRONIC PAIN
TYPE: CHRONIC PAIN

## 2018-08-28 ASSESSMENT — PAIN SCALES - GENERAL
PAINLEVEL_OUTOF10: 8
PAINLEVEL_OUTOF10: 4

## 2018-08-28 NOTE — PROGRESS NOTES
08/28/18 0551 191 lb 6 oz (86.8 kg)     PHYSICAL EXAM      GENERAL APPEARANCE: sleepy and fatigued on the Bipap  SKIN: warm and dry, multiple areas of ecchymosis noted  EYES: conjunctivae pale   ENT:  moist mucus membranes  NECK:   Thick neck, midline trachea and no accessory muscle use  PULMONARY:Decreased breath sounds in the bases with some crackles as well bilaterally  CARDIOVASCULAR: Regular rate and rhythm  With a positive S1 and S2 and no rubs  ABDOMEN: obese and soft and mildly distended bowel sounds present  EXTREMITIES: large areas of firmness and bruising over the right upper arm with no significant leg swelling     CURRENT MEDICATIONS        methylPREDNISolone sodium (SOLU-MEDROL) injection 40 mg Q6H   clindamycin (CLEOCIN) 600 mg in dextrose 5 % 50 mL IVPB Q8H   metoprolol succinate (TOPROL XL) extended release tablet 25 mg Daily   diatrizoate meglumine-sodium (GASTROGRAFIN) 66-10 % solution 30 mL ONCE PRN   [START ON 8/29/2018] levofloxacin (LEVAQUIN) 250 MG/50ML infusion 250 mg Q24H   furosemide (LASIX) injection 40 mg Q12H   ipratropium-albuterol (DUONEB) nebulizer solution 1 ampule TID   levalbuterol (XOPENEX) nebulizer solution 1.25 mg Q4H PRN   calcitRIOL (ROCALTROL) capsule 0.25 mcg Daily   amiodarone (CORDARONE) tablet 200 mg BID   metoprolol (LOPRESSOR) injection 5 mg Q4H PRN   diltiazem 125 mg in dextrose 5 % 125 mL infusion Continuous   acetaminophen (TYLENOL) tablet 650 mg Q4H PRN   ALPRAZolam (XANAX) tablet 0.25 mg TID PRN   aspirin EC tablet 81 mg Daily   atorvastatin (LIPITOR) tablet 20 mg Nightly   mometasone-formoterol (DULERA) 200-5 MCG/ACT inhaler 2 puff BID   carbidopa-levodopa (SINEMET CR)  MG per extended release tablet 1 tablet 4x Daily   conjugated estrogens (PREMARIN) vaginal cream 0.5 g Every Other Day   ferrous sulfate EC tablet 325 mg BID WC   HYDROcodone-acetaminophen (NORCO) 5-325 MG per tablet 1 tablet Q4H PRN   pantoprazole (PROTONIX) tablet 40 mg BID AC TIBC:    Lab Results   Component Value Date    TIBC 304 07/05/2018     FERRITIN:    Lab Results   Component Value Date    FERRITIN 598 07/05/2018     RADHA:   Lab Results   Component Value Date    RADHA NEGATIVE 08/21/2018       SPEP:   Lab Results   Component Value Date    PROT 6.7 08/20/2018    PATH ELECTRONICALLY SIGNED. Pamela Avila M.D. 08/22/2018     UPEP:   Lab Results   Component Value Date    TPU 49 08/22/2018    TPU 49 08/22/2018      HEPBSAG:No results found for: HEPBSAG  HEPCAB:  Lab Results   Component Value Date    HEPCAB NONREACTIVE 08/21/2018     MPO ANCA:   Lab Results   Component Value Date    MPO 10 08/21/2018    . PR3 ANCA:    Lab Results   Component Value Date    PR3 7 08/21/2018     URINE SODIUM:    Lab Results   Component Value Date    EDWARD 71 08/22/2018      URINE CREATININE:    Lab Results   Component Value Date    LABCREA 78.1 08/22/2018     URINE PROTEIN:    Lab Results   Component Value Date    TPU 49 08/22/2018    TPU 49 08/22/2018     URINALYSIS:  U/A:   Lab Results   Component Value Date    NITRU NEGATIVE 08/23/2018    COLORU YELLOW 08/23/2018    PHUR 5.5 08/23/2018    WBCUA 5 TO 10 08/23/2018    RBCUA 50  08/23/2018    MUCUS NOT REPORTED 08/23/2018    TRICHOMONAS NOT REPORTED 08/23/2018    YEAST NOT REPORTED 08/23/2018    BACTERIA MODERATE 08/23/2018    SPECGRAV 1.020 08/23/2018    LEUKOCYTESUR TRACE 08/23/2018    UROBILINOGEN Normal 08/23/2018    BILIRUBINUR NEGATIVE 08/23/2018    GLUCOSEU NEGATIVE 08/23/2018    KETUA NEGATIVE 08/23/2018    AMORPHOUS NOT REPORTED 08/23/2018         ASSESSMENT      1. Acute kidney injury :Most likely secondary to acute tubular interstitial nephritis. Allergic interstitial nephritis secondary to Bactrim is a distinct possibility as well based on the patient's clinical presentation. Creatinine continues to improve  2.   Hypertension: Blood pressures are normal at this time with the patient currently in NSR  3.  Baseline CKD stage III with a creatinine of 1.2-1.3 range Secondary to diabetic nephrosclerosis, follows up with Dr Bryson Esquivel. Urine protein creatinine ratio 0.50.7  4. History of urinary tract infections  5. Hyperkalemia secondary to HPI, recent Bactrim use. Potassium is much better  6. Significant pulmonary edema with 3 doses of Lasix 20 mg IV push given over the last 24 hours   7. Anemia of chronic disease and blood loss, has been getting Aranesp as an outpatient    PLAN      1. Aranesp 100 mcg will be given today as last injection was 8/14/18  2. Monitor the BMP daily, and CBC and magnesium  3. Close hemodynamic monitoring  4. Lasix 40 mg IV push every 12 hours starting tonight for pulmonary edema  5. Follow-up labs ordered. We will continue to closely follow with you. 6.  BP OK, HR controlled at this time, cardiology following      Please do not hesitate to call with questions.     Electronically signed by Alberto Adame MD on 8/28/2018 at 6:59 PM

## 2018-08-28 NOTE — PROGRESS NOTES
Drug interaction - QTc concern    Dear Provider(s):  A drug interaction exists between Levaquin and Amiodarone. The interaction is a potential additive QT prolongation. Please continue to monitor your patient's cardiac rhythm as you see appropriate. General risk factors for torsades de pointes  Hospitalization  Advanced Age   Female gender (2-fold risk)  Heart disease  Long Qt interval syndrome  QTc > 500 ms (2-3-fold risk)  Renal or hepatic insufficiency  Hypokalemia  Hypomagnesemia  Hypocalcemia  Diuretic use  Bradycardia  Use of more than one QT-prolonging drug  Rapid IV administration of select medicatrions      If pharmacy can be of any assistance to review the patients medications and offer alternative suggestions, please do not hesitate to contact the pharmacy at [phone]. Thank you.   Radha Park  Pharmacist, 4:28 PM

## 2018-08-28 NOTE — CONSULTS
MG EC tablet, Take 1 tablet by mouth 2 times daily (with meals)  vitamin D (CHOLECALCIFEROL) 5000 units CAPS capsule, Take 5,000 Units by mouth daily  rasagiline mesylate 1 MG TABS, Take 1 tablet by mouth daily  amiodarone (CORDARONE) 200 MG tablet, Take 1 tablet by mouth daily  albuterol (PROVENTIL) (2.5 MG/3ML) 0.083% nebulizer solution, Take 3 mLs by nebulization every 6 hours as needed for Wheezing  Oxygen Concentrator, Dx: Pneumonia, use as directed. (Patient taking differently: Dx: Pneumonia, use as directed. ON 24/7)  ondansetron (ZOFRAN) 8 MG tablet, TK 1 T PO Q 8 H PRF NAUSEA OR VOM  HYDROcodone-acetaminophen (NORCO) 5-325 MG per tablet, TK 1 T PO BID PRF PAIN  [DISCONTINUED] furosemide (LASIX) 20 MG tablet, Take 1 tablet by mouth daily (Patient taking differently: Take 40 mg by mouth daily )    Allergies    Dye [barium-containing compounds]; Pcn [penicillins];  Bactrim [sulfamethoxazole-trimethoprim]; and Red dye  Social History     Social History   Substance Use Topics    Smoking status: Former Smoker     Packs/day: 2.00     Years: 15.00     Types: Cigarettes     Quit date: 10/31/1970    Smokeless tobacco: Never Used    Alcohol use 1.2 oz/week     2 Shots of liquor per week      Comment: 4 DRINKS A YEAR     Family History      Family History   Problem Relation Age of Onset    Heart Failure Mother     Hypertension Mother     Heart Disease Mother     High Blood Pressure Mother      ROS - 6 systems   General Denies any fever or chills  HEENT Denies any diplopia, tinnitus or vertigo  Resp positive for  cough with sputum and dyspnea  Cardiac Denies any chest pain, palpitations, claudication or edema  GI Denies any melena, hematochezia, hematemesis or pyrosis   Denies any frequency, urgency, hesitancy or incontinence  Heme Denies bruising or bleeding easily  Endocrine Denies any history of diabetes or thyroid disease  Neuro Denies any focal motor or sensory deficits  Psychiatric Denies anxiety, BMP Lab Results   Component Value Date     08/28/2018    K 4.5 08/28/2018    CL 99 08/28/2018    CO2 28 08/28/2018    BUN 26 08/28/2018    CREATININE 1.66 08/28/2018    GLUCOSE 155 08/28/2018    GLUCOSE 132 03/07/2012    CALCIUM 9.5 08/28/2018    MG 2.0 08/28/2018     LFTS  Lab Results   Component Value Date    ALKPHOS 41 08/20/2018    ALT 6 08/20/2018    AST 21 08/20/2018    PROT 6.7 08/20/2018    BILITOT 0.11 08/20/2018    BILIDIR <0.08 08/20/2018    IBILI CANNOT BE CALCULATED 08/20/2018    LABALBU 4.2 08/20/2018     ABG   Lab Results   Component Value Date    LRQ5ANX 33 06/02/2013     PTT  Lab Results   Component Value Date    APTT 43.4 (H) 08/24/2018     INR   Lab Results   Component Value Date    INR 1.0 08/21/2018    INR 1.0 06/08/2018    INR 1.0 03/08/2018    PROTIME 10.7 08/21/2018    PROTIME 10.2 06/08/2018    PROTIME 10.7 03/08/2018       Radiology    CXR    (See actual reports for details)    \"Thank you for asking us to see this patient\"    Case discussed with nurse and patient/family. Questions and concerns addressed.     Electronically signed by     Amanda Adam MD on 8/28/2018 at 12:01 PM  Pulmonary Critical Care and Sleep Medicine,  [facility]  Cell: [phone]  Office: [phone]

## 2018-08-28 NOTE — PROGRESS NOTES
Spoke with Dr. Livan Allen regarding patient and what had transpiried. Dr. Livan Allen gave orders to give the Amnio bolous and not too start the gtt. Patient was breathing easier and resting more comfortably.

## 2018-08-28 NOTE — SIGNIFICANT EVENT
respirations shallow and tachypneic. Heart:  Tachycardic, irregularly irregular. Abdomen:  Obese and firm, palpable umbilical hernia. Nontender, nondistended, normal bowel sounds, no hepatomegaly, splenomegaly. Extremities:  +1 bilateral lower extremity edema, no redness, tenderness in the calves. Skin:  Bruising to right upper extremity, no additional gross lesions, rashes, induration noted. Problem List:        Primary Problem  Acute kidney injury superimposed on chronic kidney disease [facility])    Active Hospital Problems    Diagnosis Date Noted    Hypomagnesemia [E83.42] 08/27/2018    Thrombocytopenia ([facility] [D69.6] 08/25/2018    Hypovolemic shock ([address] [R57.1] 08/24/2018    Acute hypoxemic respiratory failure (HCC) [J96.01] 08/23/2018    Rapid atrial fibrillation ([facility] [I48.91] 08/23/2018    SIRS (systemic inflammatory response syndrome) ([facility]) [R65.10] 08/20/2018    Nausea and vomiting in adult [R11.2] 08/20/2018    Acute kidney injury superimposed on chronic kidney disease ([address] [N17.9, N18.9] 08/20/2018    Bacterial UTI [N39.0, A49.9] 08/20/2018    Dehydration [E86.0]     Anemia of chronic renal failure, stage 4 (severe) ([facility] [N18.4, D63.1] 04/25/2018    CKD stage 3 due to type 2 diabetes mellitus ([address] [E11.22, N18.3] 03/08/2018    ECTOR on CPAP [G47.33, Z99.89]     Chronic atrial fibrillation ([address] [I48.2] 05/31/2013    Essential hypertension [I10] 06/07/2012    Controlled type 2 diabetes mellitus with stage 3 chronic kidney disease, without long-term current use of insulin ([address] [E11.22, N18.3]        Medications: Allergies:     Allergies   Allergen Reactions    Dye [Barium-Containing Compounds] Other (See Comments)     Cause Afib per     Pcn [Penicillins] Itching and Swelling    Bactrim [Sulfamethoxazole-Trimethoprim] Other (See Comments)     Allergic Nephritis    Red Dye Itching and Rash     This allergy is likely incorrect:  Per patient's  she has no issue with medications that have red dye in them or red food. He thinks this reaction was added to chart when the pt had a colonoscopy (possibly barium or iodine dye instead)       Current Meds:   Scheduled Meds:    furosemide  20 mg Intravenous Once    amiodarone bolus  150 mg Intravenous Once    furosemide  40 mg Intravenous Once    ipratropium-albuterol  1 ampule Inhalation TID    calcitRIOL  0.25 mcg Oral Daily    amiodarone  200 mg Oral BID    aspirin  81 mg Oral Daily    atorvastatin  20 mg Oral Nightly    mometasone-formoterol  2 puff Inhalation BID    carbidopa-levodopa  1 tablet Oral 4x Daily    conjugated estrogens  0.5 g Vaginal Every Other Day    ferrous sulfate  325 mg Oral BID WC    pantoprazole  40 mg Oral BID AC    rasagiline mesylate  1 tablet Oral Daily    rOPINIRole  2 mg Oral TID    senna  2 tablet Oral Daily    vitamin D  5,000 Units Oral Daily    sodium chloride flush  10 mL Intravenous 2 times per day    insulin lispro  0-12 Units Subcutaneous TID WC    insulin lispro  0-6 Units Subcutaneous Nightly    linagliptin  5 mg Oral Daily    sodium chloride flush  10 mL Intravenous 2 times per day    miconazole   Topical BID    magnesium oxide  400 mg Oral Daily     Continuous Infusions:    amiodarone 450mg/250ml D5W infusion      amiodarone 450mg/250ml D5W infusion Stopped (08/27/18 2000)    diltiazem (CARDIZEM) 125 mg in dextrose 5% 125 mL infusion Stopped (08/26/18 1029)    dextrose       PRN Meds: levalbuterol, metoprolol, acetaminophen, ALPRAZolam, HYDROcodone-acetaminophen, polyethylene glycol, sodium chloride flush, glucose, dextrose, glucagon (rDNA), dextrose, ondansetron **OR** ondansetron, sodium chloride flush, promethazine    Data:     Past Medical History:   has a past medical history of Acute on chronic diastolic congestive heart failure ([address] Anesthesia complication; Asthma; Atrial fibrillation ([address]  Cataracts, bilateral; Cellulitis; Cerebral artery occlusion with cerebral infarction ([facility] Chronic kidney disease; Constipation; COPD (chronic obstructive pulmonary disease) ([facility] Difficult intravenous access; Diverticulosis; DM2 (diabetes mellitus, type 2) ([facility] Full dentures; GERD (gastroesophageal reflux disease); Headache(784.0); [place] (hard of hearing); Hyperlipidemia; Hyperplastic polyp of intestine; Hypertension; Impaired ambulation; Kidney stone; Morbid obesity with BMI of 40.0-44.9, adult ([facility] ECTOR on CPAP; Osteoarthritis; Parkinson disease ([facility] Restless leg syndrome; Spinal stenosis in cervical region; Type II or unspecified type diabetes mellitus without mention of complication, not stated as uncontrolled; Unspecified sleep apnea; Urethral caruncle; and Wears glasses. Vitals:  BP (!) 118/53   Pulse 75   Temp 97.2 °F (36.2 °C) (Temporal)   Resp 16   Ht 5' 1\" (1.549 m)   Wt 192 lb 11.2 oz (87.4 kg)   LMP 2004 (Within Years)   SpO2 95%   BMI 36.41 kg/m²   Temp (24hrs), Av.7 °F (36.5 °C), Min:97.2 °F (36.2 °C), Max:98.4 °F (36.9 °C)    Recent Labs      18   2056  18   0754  18   1139  18   1624   POCGLU  164*  121*  184*  110*       I/O (24Hr):     Intake/Output Summary (Last 24 hours) at 18 0248  Last data filed at 18 2000   Gross per 24 hour   Intake           513.06 ml   Output              900 ml   Net          -386.94 ml       Labs:    Hematology:  Recent Labs      18   0523  18   0552  18   0458   WBC  7.9  7.1  5.7   RBC  2.38*  2.35*  2.25*   HGB  8.0*  8.1*  7.4*   HCT  24.7*  24.3*  23.3*   MCV  104.0*  103.4*  103.9*   MCH  33.7  34.4*  32.8   MCHC  32.5  33.3  31.6   RDW  15.6*  14.5  15.1*   PLT  117*  151  159   MPV  7.8  NOT REPORTED  7.5     Chemistry:  Recent Labs      18   0523  18   0552  18   0458  18   1725   NA  138  142  143   --    K  4.4  4.4  4.4   --    CL  101  102  104   --    CO2  23  29  29   --    GLUCOSE  114*  139*  151*   --    BUN  42* 36*  32*   --    CREATININE  2.73*  2.14*  2.00*   --    MG  1.8  1.6  1.4*  2.1   ANIONGAP  14  11  10   --    LABGLOM  17*  23*  25*   --    GFRAA  21*  28*  30*   --    CALCIUM  9.9  9.8  9.6   --    CAION  1.41*  1.40*  1.33   --      Recent Labs      08/25/18   1455   08/26/18   1149  08/26/18   1721  08/26/18   2056  08/27/18   0754  08/27/18   1139  08/27/18   1624   TSH  2.58   --    --    --    --    --    --    --    POCGLU   --    < >  179*  107*  164*  121*  184*  110*    < > = values in this interval not displayed.          Lab Results   Component Value Date/Time    SPECIAL LT HAND 7ML 08/20/2018 03:45 AM     Lab Results   Component Value Date/Time    CULTURE NO GROWTH 6 DAYS 08/20/2018 03:45 AM       Lab Results   Component Value Date    POCPH 7.36 06/02/2013    POCPCO2 55 06/02/2013    POCPO2 63 06/02/2013    POCHCO3 31.0 06/02/2013    NBEA NOT REPORTED 06/02/2013    PBEA 6 06/02/2013    YSH0PDZ 33 06/02/2013    UCUE7RYG 90 06/02/2013    FIO2 45.0 01/15/2017       Radiology:    XR CHEST PORTABLE   Status: Preliminary result     Slight progression of the right lower lobe airspace disease   Clearing of left upper lobe airspace disease process   Stable small left pleural effusion      Rubye Matters, APRN - CNP  8/28/2018  2:48 AM

## 2018-08-28 NOTE — PROGRESS NOTES
[facility]    Progress Note    8/28/2018    7:17 AM    Name:   Javy Lara  MRN:     [id number]     Acct:      [de-identified]   Room:   2032/2032-01   Day:  8  Admit Date:  8/20/2018  1:21 AM    PCP:   Jj Mcdonnell DO  Code Status:  Full Code    Subjective:     C/C:   Chief Complaint   Patient presents with    Emesis    Nausea     Interval History Status: worsened. Had n/v last night-dark emesis  Also developed rapid afib again and was bolused with iv amio again  Had sob as well and placed back onto bipap and given lasix with good urine output    Brief History:     Per my partner:   \"This is 82-year-old white female who is noted with a complaint of nausea, vomiting and diarrhea and found have acute kidney injury and hypotension.  She is admitted into the intensive care unit and placed on IV fluids and Levophed drip.  She has recently been on Bactrim as an outpatient for urinary tract infection.     On the evening of August 22, 2018 she developed shortness of breath and respiratory distress requiring BiPAP placement.  Chest x-ray August 23 showing pulmonary edema.  IV fluids were discontinued     On the evening of August 23 round 6:30 PM she developed rapid atrial fibrillation and was placed on Cardizem drip as well as heparin drip. cardiac enzymes are negative.  Throughout the night she converted to sinus rhythm and Cardizem drip was weaned off.  She's had recurrent episodes of atrial fibrillation with rapid response and cardiology has increased her amiodarone to 200 mg twice a day\"    Review of Systems:     Constitutional:  negative for chills, fevers, sweats  Respiratory:  positive for cough, dyspnea on exertion, shortness of breath, wheezing  Cardiovascular:  negative for chest pain, chest pressure/discomfort, lower extremity edema, palpitations  Gastrointestinal:  negative for abdominal pain, constipation, diarrhea  Neurological:  negative for dizziness, headache    Medications: Allergies: Allergies   Allergen Reactions    Dye [Barium-Containing Compounds] Other (See Comments)     Cause Afib per     Pcn [Penicillins] Itching and Swelling    Bactrim [Sulfamethoxazole-Trimethoprim] Other (See Comments)     Allergic Nephritis    Red Dye Itching and Rash     This allergy is likely incorrect:  Per patient's  she has no issue with medications that have red dye in them or red food.  He thinks this reaction was added to chart when the pt had a colonoscopy (possibly barium or iodine dye instead)       Current Meds:   Scheduled Meds:    ipratropium-albuterol  1 ampule Inhalation TID    calcitRIOL  0.25 mcg Oral Daily    amiodarone  200 mg Oral BID    aspirin  81 mg Oral Daily    atorvastatin  20 mg Oral Nightly    mometasone-formoterol  2 puff Inhalation BID    carbidopa-levodopa  1 tablet Oral 4x Daily    conjugated estrogens  0.5 g Vaginal Every Other Day    ferrous sulfate  325 mg Oral BID WC    pantoprazole  40 mg Oral BID AC    rasagiline mesylate  1 tablet Oral Daily    rOPINIRole  2 mg Oral TID    senna  2 tablet Oral Daily    vitamin D  5,000 Units Oral Daily    sodium chloride flush  10 mL Intravenous 2 times per day    insulin lispro  0-12 Units Subcutaneous TID WC    insulin lispro  0-6 Units Subcutaneous Nightly    linagliptin  5 mg Oral Daily    sodium chloride flush  10 mL Intravenous 2 times per day    miconazole   Topical BID    magnesium oxide  400 mg Oral Daily     Continuous Infusions:    diltiazem (CARDIZEM) 125 mg in dextrose 5% 125 mL infusion Stopped (08/26/18 1029)    dextrose       PRN Meds: levalbuterol, metoprolol, acetaminophen, ALPRAZolam, HYDROcodone-acetaminophen, polyethylene glycol, sodium chloride flush, glucose, dextrose, glucagon (rDNA), dextrose, ondansetron **OR** ondansetron, sodium chloride flush, promethazine    Data:     Past Medical History:   has a past medical history of Acute on chronic diastolic congestive heart failure ([facility] Anesthesia complication; Asthma; Atrial fibrillation ([facility] Cataracts, bilateral; Cellulitis; Cerebral artery occlusion with cerebral infarction ([facility] Chronic kidney disease; Constipation; COPD (chronic obstructive pulmonary disease) ([facility] Difficult intravenous access; Diverticulosis; DM2 (diabetes mellitus, type 2) ([facility] Full dentures; GERD (gastroesophageal reflux disease); Headache(784.0); [place] (hard of hearing); Hyperlipidemia; Hyperplastic polyp of intestine; Hypertension; Impaired ambulation; Kidney stone; Morbid obesity with BMI of 40.0-44.9, adult ([facility] ECTOR on CPAP; Osteoarthritis; Parkinson disease ([facility] Restless leg syndrome; Spinal stenosis in cervical region; Type II or unspecified type diabetes mellitus without mention of complication, not stated as uncontrolled; Unspecified sleep apnea; Urethral caruncle; and Wears glasses. Social History:   reports that she quit smoking about 47 years ago. Her smoking use included Cigarettes. She has a 30.00 pack-year smoking history. She has never used smokeless tobacco. She reports that she drinks about 1.2 oz of alcohol per week . She reports that she does not use drugs. Family History:   Family History   Problem Relation Age of Onset    Heart Failure Mother     Hypertension Mother     Heart Disease Mother     High Blood Pressure Mother        Vitals:  BP (!) 92/46   Pulse 119   Temp 98 °F (36.7 °C) (Temporal)   Resp 16   Ht 5' 1\" (1.549 m)   Wt 191 lb 6 oz (86.8 kg)   LMP 2004 (Within Years)   SpO2 100%   BMI 36.16 kg/m²   Temp (24hrs), Av.6 °F (36.4 °C), Min:97.2 °F (36.2 °C), Max:98 °F (36.7 °C)    Recent Labs      18   0754  18   1139  18   1624  18   2108   POCGLU  121*  184*  110*  153*       I/O (24Hr):     Intake/Output Summary (Last 24 hours) at 18 0717  Last data filed at 18 0552   Gross per 24 hour   Intake           513.06 ml left abdomen suggestive of renal stones         Physical Examination:        General appearance:  alert, cooperative and no distress  Mental Status:  oriented to person, place and time and normal affect  Lungs:  Rhonchi and wheezing bilaterally, normal effort  Heart:  tachy and irreg irreg rhythm, no murmur  Abdomen:  soft, nontender, nondistended, normal bowel sounds, no masses, hepatomegaly, splenomegaly; obese  Extremities:  no edema, redness, tenderness in the calves  Skin:  no gross lesions, rashes, induration    Assessment:        Primary Problem  Acute kidney injury superimposed on chronic kidney disease [facility])    Active Hospital Problems    Diagnosis Date Noted    Hypomagnesemia [E83.42] 08/27/2018    Thrombocytopenia ([facility] [D69.6] 08/25/2018    Hypovolemic shock ([facility] [R57.1] 08/24/2018    Acute hypoxemic respiratory failure (HCC) [J96.01] 08/23/2018    Rapid atrial fibrillation (HCC) [I48.91] 08/23/2018    SIRS (systemic inflammatory response syndrome) (HCC) [R65.10] 08/20/2018    Nausea and vomiting in adult [R11.2] 08/20/2018    Acute kidney injury superimposed on chronic kidney disease ([address] [N17.9, N18.9] 08/20/2018    Bacterial UTI [N39.0, A49.9] 08/20/2018    Dehydration [E86.0]     Anemia of chronic renal failure, stage 4 (severe) ([address] [N18.4, D63.1] 04/25/2018    CKD stage 3 due to type 2 diabetes mellitus ([address] [W25.21, N18.3] 03/08/2018    ECTOR on CPAP [G47.33, Z99.89]     Chronic atrial fibrillation ([facility] [I48.2] 05/31/2013    Essential hypertension [I10] 06/07/2012    Controlled type 2 diabetes mellitus with stage 3 chronic kidney disease, without long-term current use of insulin (HCC) [E11.22, N18.3]    possible aspiration pneumonia RLL    Plan:        1. Add clinda (allergy to pcn)-concern for aspiration into RLL  2. Repeat cxr in am  3. Cont bipap this am  4. pulm consult  5. Also add steroids  6. Cont aerosols  7.  Cont amio drip, hr control    Jose Perez,

## 2018-08-28 NOTE — PROGRESS NOTES
Cardiovascular progress Note          Patient name: Benjie Hough    YOB: 1945  Date of admission:  8/20/2018       Patient seen, examined. Previous clinical entries reviewed. All available laboratory, imaging and ancillary data reviewed. Subjective:      Mrs. Chapo Mathias was admitted with nausea and vomiting and was found to be in atrial fibrillation with rapid ventricular response she spontaneously converted to sinus rhythm. She had a rough night. She went into respiratory distress and was given Lasix and was placed on Bi-PaP. She is doing better now. She went in to atrial fibrillation and is currently back in sinus after one more IV bolus of Amiodarone. She has some esophageal pain with eating this morning. She denies any, palpitations, orthopnea or PND. Systems review:  Constitutional: No fever/chills. HENT: No headache, neck pain or neck stiffness. No sore throat or dysphagia. Gastrointestinal: No abdominal pain, nausea or vomiting. Cardiac: As Above  Respiratory: As above  Neurologic: No new focal weakness or numbness  Psychiatric: Normal mood and mentation       Examination:   Vitals: BP (!) 90/51   Pulse 75   Temp 97.3 °F (36.3 °C) (Temporal)   Resp 15   Ht 5' 1\" (1.549 m)   Wt 191 lb 6 oz (86.8 kg)   LMP 04/03/2004 (Within Years)   SpO2 100%   BMI 36.16 kg/m²     Intake/Output Summary (Last 24 hours) at 08/28/18 0847  Last data filed at 08/28/18 0552   Gross per 24 hour   Intake           513.06 ml   Output             2100 ml   Net         -1586.94 ml       General appearance: Comfortable in no apparent distress. HEENT: No pallor. No icterus  Neck: Supple. Lungs:Generally decreased breath sounds. + Crackles. Heart: S1,S2  Abdomen: Soft  Extremities: + peripheral edema  Skin: No obvious rashes. Musculoskeletal: No obvious deformities. Neurologic: No focal deficits.      Labs/ Ancillary data:     CBC:   Recent Labs      08/26/18   0563  08/27/18   [phone] call us with any questions.     Electronically signed by Alex Plaza MD on 8/28/2018 at 8:47 AM

## 2018-08-28 NOTE — CONSULTS
LEGS-PT STATES IS IMPROVING    Cerebral artery occlusion with cerebral infarction [facility])     Last stroke was February 2017 w/no deficits-TOTAL OF 3    Chronic kidney disease 2013    NOT ON DIALYSIS YET    Constipation     CHRONIC    COPD (chronic obstructive pulmonary disease) ([facility] 2008    PT. SEES DR. BECK    Difficult intravenous access     VEINS ROLL    Diverticulosis     DM2 (diabetes mellitus, type 2) ([facility] 2008    Full dentures     FULL UPPER ONLY    GERD (gastroesophageal reflux disease) 2008    ON RX    Headache(784.0)     [place] (hard of hearing)     HAS HEARING AIDS BUT DOES NOT WEAR    Hyperlipidemia     Hyperplastic polyp of intestine     Hypertension 2008    Impaired ambulation     USES TRANFER CHAIR WALKER OR CANE    Kidney stone 2018    PRESENTLY-SMALL    Morbid obesity with BMI of 40.0-44.9, adult ([facility] 12/30/2016    ECTOR on CPAP 2008    USES C-PAP NIGHTLY    Osteoarthritis     Parkinson disease ([facility] 2015    Restless leg syndrome 2013    MILD    Spinal stenosis in cervical region 6/2/2013    Type II or unspecified type diabetes mellitus without mention of complication, not stated as uncontrolled     Unspecified sleep apnea     cpap nightly    Urethral caruncle 3/8/2018    Wears glasses      Past Surgical History:   Procedure Laterality Date    APPENDECTOMY      BRONCHOSCOPY  06/05/2018    CERVICAL [address] SURGERY  2012    CERVICAL SPINE SURGERY  5/31/13    posterior c5-t1    COLONOSCOPY  2009    COLONOSCOPY  12/29/2016    incomplete was not cleaned out    COLONOSCOPY  12/30/2016    COLONOSCOPY  04/25/2017     SIGMOID COLON POLYPECTOMY:  HYPERPLASTIC POLYP ,   DIVERTICULOSIS    CYSTORRHAPHY  04/04/2018    EYE SURGERY Bilateral 2017    CATARACTS W/ IOL    HERNIA REPAIR  1999    VENTRAL    LAMINECTOMY  05/31/2013    Dr. Blu James DX W/CELL WASHG [address] [place] [address] N/A 6/5/2018    BRONCHOSCOPY performed by Garry Harrison MD at [address] [place] [address] 08/28/18   0305   NA  142  143  141   K  4.4  4.4  4.5   CL  102  104  99   CO2  29  29  28   BUN  36*  32*  26*   CREATININE  2.14*  2.00*  1.66*   GLUCOSE  139*  151*  155*   CALCIUM  9.8  9.6  9.5       LFTS  No results for input(s): ALKPHOS, ALT, AST, PROT, BILITOT, BILIDIR, LABALBU in the last 72 hours. AMYLASE/LIPASE/AMMONIA  No results for input(s): AMYLASE, LIPASE, AMMONIA in the last 72 hours. PT/INR  No results for input(s): PROTIME, INR in the last 72 hours. ANEMIA STUDIES  No results for input(s): IRON, LABIRON, TIBC, UIBC, FERRITIN, TKFASGTH19, FOLATE, OCCULTBLD in the last 72 hours. FINDINGS:ct abd 8/20/18   Lower Chest: The heart is mildly enlarged.  There are cardiac calcifications   including calcific coronary artery disease.  There are few bands of bibasilar   atelectasis.       Organs: The liver, spleen, pancreas and adrenal glands are grossly normal   with the limitations of a noncontrast study.  There are bilateral   nonobstructing renal calculi measuring up to 1.2 cm in length in the right   kidney.  No acute obstructive uropathy. [place] is high attenuation layering   in the dependent portion of the gallbladder.  This may represent thick   bile/sludge.  Small gallstones not excluded.       GI/Bowel: There is a normal retrocecal appendix. [place] is some high   attenuation material within the stomach and duodenum.  This may represent   ingested medication.  Unopacified bowel loops are otherwise unremarkable.  No   evidence of bowel obstruction.       Pelvis: Urinary bladder is unremarkable.  There are unchanged calcified   uterine fibroids.       Peritoneum/Retroperitoneum: Calcific aorto iliac atherosclerotic disease with   no evidence of an aneurysm.  No adenopathy, free air or free fluid.       Bones/Soft Tissues:  There are multilevel degenerative changes in the lower   thoracic and lumbar spine.  There is heterogeneous bone density in the lower   thoracic and lumbar spine and pelvis.  No acute bone finding.           Impression   No definite acute finding in the abdomen or pelvis.       There are bilateral nonobstructing renal calculi measuring up to 1.2 cm in   length in the right kidney.       High attenuation layering in the dependent portion of the gallbladder, likely   thick bile/sludge.  Small gallstones cannot be excluded.  Follow-up   gallbladder ultrasound would be helpful.       Mild fibroid uterus.         FINDINGS:esophagram 8/28/18   Exam is severely limited as the patient could not stand.       The patient was placed in the reverse Trendelenburg position with head   upright with the head of the table angled upward at approximately 30 degrees.       The patient was given water-soluble contrast and immediately began coughing.       The visualized proximal esophagus was patulous and dilated.  Contrast did not   significantly migrate to the distal esophagus or GE junction.  Due to   suspected aspiration and the risk for further aspiration, the exam was   terminated.           Impression   1. Suspected aspiration after the administration of water-soluble contrast   when the patient began coughing.  Due to suspected aspiration and risk for   further aspiration, the exam was terminated.  A modified barium swallow is   recommended to rule out aspiration prior to any repeat esophagram.   2. Exam is severely limited as the patient could not stand and did not drink   any contrast beyond the 1st sip due to suspected aspiration and risk for   further aspiration. 3. Visualized proximal esophagus appears patulous and dilated. The findings were sent to the Radiology Results [address] at 11:20   am on 8/28/2018to be communicated to a licensed caregiver.                 IMPRESSION/PLAN:  1.  Dysphagia with food and liquids; failed esophagram and possible aspiration pneumonia ; the pt has a history of parkinson disease that could be contributing to the dysphagia   -MBS ordered

## 2018-08-28 NOTE — PROCEDURES
INSTRUMENTAL SWALLOW REPORT  MODIFIED BARIUM SWALLOW    NAME: Estefani Delaney   : 1945  MRN: [id number]       Date of Eval: 2018              Referring Diagnosis(es):      Past Medical History:  has a past medical history of Acute on chronic diastolic congestive heart failure ([facility] [facility] Anesthesia complication; Asthma; Atrial fibrillation ([address] Cataracts, bilateral; Cellulitis; Cerebral artery occlusion with cerebral infarction ([facility] Chronic kidney disease; Constipation; COPD (chronic obstructive pulmonary disease) ([address] Difficult intravenous access; Diverticulosis; DM2 (diabetes mellitus, type 2) ([facility] Full dentures; GERD (gastroesophageal reflux disease); Headache(784.0); [place] (hard of hearing); Hyperlipidemia; Hyperplastic polyp of intestine; Hypertension; Impaired ambulation; Kidney stone; Morbid obesity with BMI of 40.0-44.9, adult ([facility] ECTOR on CPAP; Osteoarthritis; Parkinson disease ([facility] Restless leg syndrome; Spinal stenosis in cervical region; Type II or unspecified type diabetes mellitus without mention of complication, not stated as uncontrolled; Unspecified sleep apnea; Urethral caruncle; and Wears glasses. Past Surgical History:  has a past surgical history that includes Cervical disc surgery (); Appendectomy; Tonsillectomy and adenoidectomy (); hernia repair (); eye surgery (Bilateral, 2017); Cervical spine surgery (13); laminectomy (2013); Upper gastrointestinal endoscopy (2016); Colonoscopy (); Colonoscopy (2016); Colonoscopy (2016); Colonoscopy (2017); pr colsc flx w/removal lesion by hot bx forceps (N/A, 2017); Cystorrhaphy (2018); pr cystourethroscopy,biopsies (N/A, 2018); bronchoscopy (2018); and pr brnchsc incl fluor gdnce dx w/cell washg spx (N/A, 2018).     Current Diet Solid Consistency: NPO  Current Diet Liquid Consistency: NPO       Type of Study: Initial MBS       Patient Complaints/Reason for

## 2018-08-28 NOTE — PLAN OF CARE
[facility]    Second Visit Note  For more detailed information please refer to the progress note of the day      8/28/2018    6:25 PM    Name:   Juan Moore  MRN:     [id number]     Acct:      [de-identified]   Room:   2032/2032-01  IP Day:  8  Admit Date:  8/20/2018  1:21 AM    PCP:   Kelly Nuñez DO  Code Status:  Full Code        Pt vitals were reviewed   New labs were reviewed   Patient was seen    Updated plan :     1. D/w  and daughter  2. On bipap  3.  D/w dr Arik Ac and dr Makayla Carpio Blood, DO  8/28/2018  6:25 PM

## 2018-08-29 ENCOUNTER — APPOINTMENT (OUTPATIENT)
Dept: GENERAL RADIOLOGY | Age: 73
DRG: 871 | End: 2018-08-29
Payer: COMMERCIAL

## 2018-08-29 ENCOUNTER — ANESTHESIA EVENT (OUTPATIENT)
Dept: OPERATING ROOM | Age: 73
DRG: 871 | End: 2018-08-29
Payer: COMMERCIAL

## 2018-08-29 ENCOUNTER — ANESTHESIA (OUTPATIENT)
Dept: OPERATING ROOM | Age: 73
DRG: 871 | End: 2018-08-29
Payer: COMMERCIAL

## 2018-08-29 VITALS — SYSTOLIC BLOOD PRESSURE: 154 MMHG | OXYGEN SATURATION: 82 % | DIASTOLIC BLOOD PRESSURE: 73 MMHG

## 2018-08-29 LAB
ABSOLUTE EOS #: 0 K/UL (ref 0–0.4)
ABSOLUTE IMMATURE GRANULOCYTE: ABNORMAL K/UL (ref 0–0.3)
ABSOLUTE LYMPH #: 0.4 K/UL (ref 1–4.8)
ABSOLUTE MONO #: 0.4 K/UL (ref 0.2–0.8)
ANION GAP SERPL CALCULATED.3IONS-SCNC: 14 MMOL/L (ref 9–17)
BASOPHILS # BLD: 0 % (ref 0–2)
BASOPHILS ABSOLUTE: 0 K/UL (ref 0–0.2)
BUN BLDV-MCNC: 33 MG/DL (ref 8–23)
BUN/CREAT BLD: 20 (ref 9–20)
CALCIUM SERPL-MCNC: 8.8 MG/DL (ref 8.6–10.4)
CHLORIDE BLD-SCNC: 98 MMOL/L (ref 98–107)
CO2: 30 MMOL/L (ref 20–31)
CREAT SERPL-MCNC: 1.69 MG/DL (ref 0.5–0.9)
DIFFERENTIAL TYPE: ABNORMAL
EOSINOPHILS RELATIVE PERCENT: 0 % (ref 1–4)
GFR AFRICAN AMERICAN: 36 ML/MIN
GFR NON-AFRICAN AMERICAN: 30 ML/MIN
GFR SERPL CREATININE-BSD FRML MDRD: ABNORMAL ML/MIN/{1.73_M2}
GFR SERPL CREATININE-BSD FRML MDRD: ABNORMAL ML/MIN/{1.73_M2}
GLUCOSE BLD-MCNC: 155 MG/DL (ref 65–105)
GLUCOSE BLD-MCNC: 178 MG/DL (ref 65–105)
GLUCOSE BLD-MCNC: 200 MG/DL (ref 65–105)
GLUCOSE BLD-MCNC: 208 MG/DL (ref 65–105)
GLUCOSE BLD-MCNC: 222 MG/DL (ref 70–99)
HCT VFR BLD CALC: 24.6 % (ref 36–46)
HEMOGLOBIN: 8.1 G/DL (ref 12–16)
IMMATURE GRANULOCYTES: ABNORMAL %
LYMPHOCYTES # BLD: 3 % (ref 24–44)
MAGNESIUM: 1.7 MG/DL (ref 1.6–2.6)
MCH RBC QN AUTO: 34.1 PG (ref 26–34)
MCHC RBC AUTO-ENTMCNC: 33 G/DL (ref 31–37)
MCV RBC AUTO: 103.4 FL (ref 80–100)
MONOCYTES # BLD: 3 % (ref 1–7)
NRBC AUTOMATED: ABNORMAL PER 100 WBC
PDW BLD-RTO: 14.4 % (ref 11.5–14.5)
PLATELET # BLD: 226 K/UL (ref 130–400)
PLATELET ESTIMATE: ABNORMAL
PMV BLD AUTO: ABNORMAL FL (ref 6–12)
POTASSIUM SERPL-SCNC: 4.5 MMOL/L (ref 3.7–5.3)
RBC # BLD: 2.38 M/UL (ref 4–5.2)
RBC # BLD: ABNORMAL 10*6/UL
SEG NEUTROPHILS: 94 % (ref 36–66)
SEGMENTED NEUTROPHILS ABSOLUTE COUNT: 12 K/UL (ref 1.8–7.7)
SODIUM BLD-SCNC: 142 MMOL/L (ref 135–144)
WBC # BLD: 12.8 K/UL (ref 3.5–11)
WBC # BLD: ABNORMAL 10*3/UL

## 2018-08-29 PROCEDURE — 85025 COMPLETE CBC W/AUTO DIFF WBC: CPT

## 2018-08-29 PROCEDURE — 2580000003 HC RX 258: Performed by: INTERNAL MEDICINE

## 2018-08-29 PROCEDURE — 88305 TISSUE EXAM BY PATHOLOGIST: CPT

## 2018-08-29 PROCEDURE — 82947 ASSAY GLUCOSE BLOOD QUANT: CPT

## 2018-08-29 PROCEDURE — C9113 INJ PANTOPRAZOLE SODIUM, VIA: HCPCS | Performed by: INTERNAL MEDICINE

## 2018-08-29 PROCEDURE — 3609012400 HC EGD TRANSORAL BIOPSY SINGLE/MULTIPLE: Performed by: INTERNAL MEDICINE

## 2018-08-29 PROCEDURE — 2580000003 HC RX 258: Performed by: NURSE ANESTHETIST, CERTIFIED REGISTERED

## 2018-08-29 PROCEDURE — 6370000000 HC RX 637 (ALT 250 FOR IP): Performed by: INTERNAL MEDICINE

## 2018-08-29 PROCEDURE — 0DB58ZX EXCISION OF ESOPHAGUS, VIA NATURAL OR ARTIFICIAL OPENING ENDOSCOPIC, DIAGNOSTIC: ICD-10-PCS | Performed by: INTERNAL MEDICINE

## 2018-08-29 PROCEDURE — 3700000000 HC ANESTHESIA ATTENDED CARE: Performed by: INTERNAL MEDICINE

## 2018-08-29 PROCEDURE — 2709999900 HC NON-CHARGEABLE SUPPLY: Performed by: INTERNAL MEDICINE

## 2018-08-29 PROCEDURE — 2500000003 HC RX 250 WO HCPCS: Performed by: NURSE PRACTITIONER

## 2018-08-29 PROCEDURE — 0DC58ZZ EXTIRPATION OF MATTER FROM ESOPHAGUS, VIA NATURAL OR ARTIFICIAL OPENING ENDOSCOPIC: ICD-10-PCS | Performed by: INTERNAL MEDICINE

## 2018-08-29 PROCEDURE — 6360000002 HC RX W HCPCS: Performed by: INTERNAL MEDICINE

## 2018-08-29 PROCEDURE — 2700000000 HC OXYGEN THERAPY PER DAY

## 2018-08-29 PROCEDURE — 2500000003 HC RX 250 WO HCPCS: Performed by: INTERNAL MEDICINE

## 2018-08-29 PROCEDURE — 83735 ASSAY OF MAGNESIUM: CPT

## 2018-08-29 PROCEDURE — 94660 CPAP INITIATION&MGMT: CPT

## 2018-08-29 PROCEDURE — 94640 AIRWAY INHALATION TREATMENT: CPT

## 2018-08-29 PROCEDURE — 94762 N-INVAS EAR/PLS OXIMTRY CONT: CPT

## 2018-08-29 PROCEDURE — 2580000003 HC RX 258: Performed by: RADIOLOGY

## 2018-08-29 PROCEDURE — 43247 EGD REMOVE FOREIGN BODY: CPT | Performed by: INTERNAL MEDICINE

## 2018-08-29 PROCEDURE — 7100000001 HC PACU RECOVERY - ADDTL 15 MIN: Performed by: INTERNAL MEDICINE

## 2018-08-29 PROCEDURE — 88312 SPECIAL STAINS GROUP 1: CPT

## 2018-08-29 PROCEDURE — 6370000000 HC RX 637 (ALT 250 FOR IP): Performed by: NURSE PRACTITIONER

## 2018-08-29 PROCEDURE — 6360000002 HC RX W HCPCS: Performed by: NURSE PRACTITIONER

## 2018-08-29 PROCEDURE — 36415 COLL VENOUS BLD VENIPUNCTURE: CPT

## 2018-08-29 PROCEDURE — 6360000002 HC RX W HCPCS: Performed by: NURSE ANESTHETIST, CERTIFIED REGISTERED

## 2018-08-29 PROCEDURE — 3700000001 HC ADD 15 MINUTES (ANESTHESIA): Performed by: INTERNAL MEDICINE

## 2018-08-29 PROCEDURE — 71045 X-RAY EXAM CHEST 1 VIEW: CPT

## 2018-08-29 PROCEDURE — 2580000003 HC RX 258: Performed by: NURSE PRACTITIONER

## 2018-08-29 PROCEDURE — 97110 THERAPEUTIC EXERCISES: CPT

## 2018-08-29 PROCEDURE — 99232 SBSQ HOSP IP/OBS MODERATE 35: CPT | Performed by: INTERNAL MEDICINE

## 2018-08-29 PROCEDURE — 2060000000 HC ICU INTERMEDIATE R&B

## 2018-08-29 PROCEDURE — 80048 BASIC METABOLIC PNL TOTAL CA: CPT

## 2018-08-29 PROCEDURE — 7100000000 HC PACU RECOVERY - FIRST 15 MIN: Performed by: INTERNAL MEDICINE

## 2018-08-29 RX ORDER — MORPHINE SULFATE 2 MG/ML
1 INJECTION, SOLUTION INTRAMUSCULAR; INTRAVENOUS
Status: DISCONTINUED | OUTPATIENT
Start: 2018-08-29 | End: 2018-09-07 | Stop reason: HOSPADM

## 2018-08-29 RX ORDER — ONDANSETRON 2 MG/ML
4 INJECTION INTRAMUSCULAR; INTRAVENOUS
Status: DISCONTINUED | OUTPATIENT
Start: 2018-08-29 | End: 2018-08-29 | Stop reason: HOSPADM

## 2018-08-29 RX ORDER — FLUCONAZOLE 2 MG/ML
200 INJECTION, SOLUTION INTRAVENOUS EVERY 24 HOURS
Status: DISCONTINUED | OUTPATIENT
Start: 2018-08-29 | End: 2018-09-01

## 2018-08-29 RX ORDER — 0.9 % SODIUM CHLORIDE 0.9 %
10 VIAL (ML) INJECTION 2 TIMES DAILY
Status: DISCONTINUED | OUTPATIENT
Start: 2018-08-29 | End: 2018-08-31

## 2018-08-29 RX ORDER — SODIUM CHLORIDE 9 MG/ML
INJECTION, SOLUTION INTRAVENOUS CONTINUOUS PRN
Status: DISCONTINUED | OUTPATIENT
Start: 2018-08-29 | End: 2018-08-29 | Stop reason: SDUPTHER

## 2018-08-29 RX ORDER — PROPOFOL 10 MG/ML
INJECTION, EMULSION INTRAVENOUS PRN
Status: DISCONTINUED | OUTPATIENT
Start: 2018-08-29 | End: 2018-08-29 | Stop reason: SDUPTHER

## 2018-08-29 RX ORDER — DIPHENHYDRAMINE HYDROCHLORIDE 50 MG/ML
12.5 INJECTION INTRAMUSCULAR; INTRAVENOUS
Status: DISCONTINUED | OUTPATIENT
Start: 2018-08-29 | End: 2018-08-29 | Stop reason: HOSPADM

## 2018-08-29 RX ORDER — DEXAMETHASONE SODIUM PHOSPHATE 10 MG/ML
4 INJECTION INTRAMUSCULAR; INTRAVENOUS
Status: DISCONTINUED | OUTPATIENT
Start: 2018-08-29 | End: 2018-08-29 | Stop reason: HOSPADM

## 2018-08-29 RX ORDER — LABETALOL HYDROCHLORIDE 5 MG/ML
5 INJECTION, SOLUTION INTRAVENOUS EVERY 10 MIN PRN
Status: DISCONTINUED | OUTPATIENT
Start: 2018-08-29 | End: 2018-08-29 | Stop reason: HOSPADM

## 2018-08-29 RX ORDER — FENTANYL CITRATE 50 UG/ML
25 INJECTION, SOLUTION INTRAMUSCULAR; INTRAVENOUS EVERY 5 MIN PRN
Status: DISCONTINUED | OUTPATIENT
Start: 2018-08-29 | End: 2018-08-29 | Stop reason: HOSPADM

## 2018-08-29 RX ORDER — HYDRALAZINE HYDROCHLORIDE 20 MG/ML
5 INJECTION INTRAMUSCULAR; INTRAVENOUS EVERY 10 MIN PRN
Status: DISCONTINUED | OUTPATIENT
Start: 2018-08-29 | End: 2018-08-29 | Stop reason: HOSPADM

## 2018-08-29 RX ORDER — LORAZEPAM 2 MG/ML
0.5 INJECTION INTRAMUSCULAR ONCE
Status: COMPLETED | OUTPATIENT
Start: 2018-08-29 | End: 2018-08-29

## 2018-08-29 RX ORDER — LORAZEPAM 2 MG/ML
0.5 INJECTION INTRAMUSCULAR ONCE
Status: COMPLETED | OUTPATIENT
Start: 2018-08-30 | End: 2018-08-29

## 2018-08-29 RX ORDER — METHYLPREDNISOLONE SODIUM SUCCINATE 40 MG/ML
30 INJECTION, POWDER, LYOPHILIZED, FOR SOLUTION INTRAMUSCULAR; INTRAVENOUS EVERY 12 HOURS
Status: DISCONTINUED | OUTPATIENT
Start: 2018-08-30 | End: 2018-08-30

## 2018-08-29 RX ORDER — MAGNESIUM SULFATE 1 G/100ML
1 INJECTION INTRAVENOUS
Status: COMPLETED | OUTPATIENT
Start: 2018-08-29 | End: 2018-08-29

## 2018-08-29 RX ORDER — PANTOPRAZOLE SODIUM 40 MG/10ML
40 INJECTION, POWDER, LYOPHILIZED, FOR SOLUTION INTRAVENOUS 2 TIMES DAILY
Status: DISCONTINUED | OUTPATIENT
Start: 2018-08-29 | End: 2018-08-31

## 2018-08-29 RX ORDER — MAGNESIUM SULFATE HEPTAHYDRATE 500 MG/ML
2 INJECTION, SOLUTION INTRAMUSCULAR; INTRAVENOUS ONCE
Status: DISCONTINUED | OUTPATIENT
Start: 2018-08-29 | End: 2018-08-29 | Stop reason: CLARIF

## 2018-08-29 RX ORDER — HYDROMORPHONE HCL 110MG/55ML
0.5 PATIENT CONTROLLED ANALGESIA SYRINGE INTRAVENOUS EVERY 5 MIN PRN
Status: DISCONTINUED | OUTPATIENT
Start: 2018-08-29 | End: 2018-08-29 | Stop reason: HOSPADM

## 2018-08-29 RX ADMIN — METOPROLOL TARTRATE 5 MG: 5 INJECTION, SOLUTION INTRAVENOUS at 01:35

## 2018-08-29 RX ADMIN — METHYLPREDNISOLONE SODIUM SUCCINATE 40 MG: 40 INJECTION, POWDER, FOR SOLUTION INTRAMUSCULAR; INTRAVENOUS at 01:31

## 2018-08-29 RX ADMIN — MORPHINE SULFATE 1 MG: 2 INJECTION, SOLUTION INTRAMUSCULAR; INTRAVENOUS at 20:06

## 2018-08-29 RX ADMIN — FLUCONAZOLE 200 MG: 2 INJECTION, SOLUTION INTRAVENOUS at 18:15

## 2018-08-29 RX ADMIN — MAGNESIUM SULFATE HEPTAHYDRATE 1 G: 1 INJECTION, SOLUTION INTRAVENOUS at 10:23

## 2018-08-29 RX ADMIN — Medication 10 ML: at 20:07

## 2018-08-29 RX ADMIN — CLINDAMYCIN PHOSPHATE 600 MG: 600 INJECTION, SOLUTION INTRAVENOUS at 07:19

## 2018-08-29 RX ADMIN — IPRATROPIUM BROMIDE AND ALBUTEROL SULFATE 1 AMPULE: .5; 3 SOLUTION RESPIRATORY (INHALATION) at 07:48

## 2018-08-29 RX ADMIN — IPRATROPIUM BROMIDE AND ALBUTEROL SULFATE 1 AMPULE: .5; 3 SOLUTION RESPIRATORY (INHALATION) at 20:32

## 2018-08-29 RX ADMIN — LEVOFLOXACIN 250 MG: 5 INJECTION, SOLUTION INTRAVENOUS at 16:55

## 2018-08-29 RX ADMIN — MORPHINE SULFATE 1 MG: 2 INJECTION, SOLUTION INTRAMUSCULAR; INTRAVENOUS at 22:30

## 2018-08-29 RX ADMIN — MORPHINE SULFATE 1 MG: 2 INJECTION, SOLUTION INTRAMUSCULAR; INTRAVENOUS at 03:18

## 2018-08-29 RX ADMIN — INSULIN LISPRO 4 UNITS: 100 INJECTION, SOLUTION INTRAVENOUS; SUBCUTANEOUS at 08:56

## 2018-08-29 RX ADMIN — Medication 10 ML: at 07:24

## 2018-08-29 RX ADMIN — PROPOFOL 20 MG: 10 INJECTION, EMULSION INTRAVENOUS at 14:01

## 2018-08-29 RX ADMIN — MAGNESIUM SULFATE HEPTAHYDRATE 1 G: 1 INJECTION, SOLUTION INTRAVENOUS at 11:22

## 2018-08-29 RX ADMIN — SODIUM CHLORIDE: 9 INJECTION, SOLUTION INTRAVENOUS at 13:55

## 2018-08-29 RX ADMIN — IPRATROPIUM BROMIDE AND ALBUTEROL SULFATE 1 AMPULE: .5; 3 SOLUTION RESPIRATORY (INHALATION) at 15:48

## 2018-08-29 RX ADMIN — INSULIN LISPRO 2 UNITS: 100 INJECTION, SOLUTION INTRAVENOUS; SUBCUTANEOUS at 18:18

## 2018-08-29 RX ADMIN — CLINDAMYCIN PHOSPHATE 600 MG: 600 INJECTION, SOLUTION INTRAVENOUS at 15:56

## 2018-08-29 RX ADMIN — CLINDAMYCIN PHOSPHATE 600 MG: 600 INJECTION, SOLUTION INTRAVENOUS at 23:58

## 2018-08-29 RX ADMIN — LORAZEPAM 0.5 MG: 2 INJECTION, SOLUTION INTRAMUSCULAR; INTRAVENOUS at 01:31

## 2018-08-29 RX ADMIN — MICONAZOLE NITRATE: 20.6 POWDER TOPICAL at 22:32

## 2018-08-29 RX ADMIN — PANTOPRAZOLE SODIUM 40 MG: 40 INJECTION, POWDER, FOR SOLUTION INTRAVENOUS at 22:11

## 2018-08-29 RX ADMIN — PROPOFOL 10 MG: 10 INJECTION, EMULSION INTRAVENOUS at 14:07

## 2018-08-29 RX ADMIN — CLINDAMYCIN PHOSPHATE 600 MG: 600 INJECTION, SOLUTION INTRAVENOUS at 01:31

## 2018-08-29 RX ADMIN — PROPOFOL 10 MG: 10 INJECTION, EMULSION INTRAVENOUS at 14:03

## 2018-08-29 RX ADMIN — METHYLPREDNISOLONE SODIUM SUCCINATE 40 MG: 40 INJECTION, POWDER, FOR SOLUTION INTRAMUSCULAR; INTRAVENOUS at 07:19

## 2018-08-29 RX ADMIN — FUROSEMIDE 40 MG: 10 INJECTION, SOLUTION INTRAMUSCULAR; INTRAVENOUS at 09:09

## 2018-08-29 RX ADMIN — INSULIN LISPRO 4 UNITS: 100 INJECTION, SOLUTION INTRAVENOUS; SUBCUTANEOUS at 12:23

## 2018-08-29 RX ADMIN — MORPHINE SULFATE 1 MG: 2 INJECTION, SOLUTION INTRAMUSCULAR; INTRAVENOUS at 17:00

## 2018-08-29 RX ADMIN — AMIODARONE HYDROCHLORIDE 150 MG: 1.5 INJECTION, SOLUTION INTRAVENOUS at 03:18

## 2018-08-29 RX ADMIN — FUROSEMIDE 40 MG: 10 INJECTION, SOLUTION INTRAMUSCULAR; INTRAVENOUS at 22:32

## 2018-08-29 RX ADMIN — PROPOFOL 10 MG: 10 INJECTION, EMULSION INTRAVENOUS at 14:13

## 2018-08-29 RX ADMIN — Medication 2 PUFF: at 07:50

## 2018-08-29 RX ADMIN — LORAZEPAM 0.5 MG: 2 INJECTION, SOLUTION INTRAMUSCULAR; INTRAVENOUS at 23:57

## 2018-08-29 RX ADMIN — Medication 10 ML: at 07:25

## 2018-08-29 RX ADMIN — Medication 10 ML: at 22:12

## 2018-08-29 RX ADMIN — PROPOFOL 10 MG: 10 INJECTION, EMULSION INTRAVENOUS at 14:16

## 2018-08-29 RX ADMIN — Medication 10 ML: at 22:33

## 2018-08-29 RX ADMIN — PROPOFOL 10 MG: 10 INJECTION, EMULSION INTRAVENOUS at 14:10

## 2018-08-29 RX ADMIN — MICONAZOLE NITRATE: 20.6 POWDER TOPICAL at 07:20

## 2018-08-29 ASSESSMENT — PULMONARY FUNCTION TESTS
PIF_VALUE: 1
PIF_VALUE: 3
PIF_VALUE: 1
PIF_VALUE: 3
PIF_VALUE: 1

## 2018-08-29 ASSESSMENT — PAIN DESCRIPTION - LOCATION: LOCATION: GENERALIZED

## 2018-08-29 ASSESSMENT — PAIN SCALES - GENERAL
PAINLEVEL_OUTOF10: 0
PAINLEVEL_OUTOF10: 0
PAINLEVEL_OUTOF10: 10
PAINLEVEL_OUTOF10: 10
PAINLEVEL_OUTOF10: 0
PAINLEVEL_OUTOF10: 7
PAINLEVEL_OUTOF10: 8
PAINLEVEL_OUTOF10: 10
PAINLEVEL_OUTOF10: 10

## 2018-08-29 ASSESSMENT — PAIN DESCRIPTION - PAIN TYPE: TYPE: CHRONIC PAIN

## 2018-08-29 NOTE — PROGRESS NOTES
Pulmonary Critical Care Progress Note  Rosendo Allan MD     Patient seen for the follow up of Acute on chronic respiratory failure, acute exacerbation COPD, pulmonary edema, pleural effusion, obstructive sleep apnea, Acute kidney injury superimposed on chronic kidney disease ([address]     Subjective:  She denies chest pain. Mild occasional cough, mostly dry. Shortness of breath is getting better. She just came back from her EGD. Examination:  Vitals: /69   Pulse 86   Temp 97.8 °F (36.6 °C) (Temporal)   Resp 18   Ht 5' 1\" (1.549 m)   Wt 180 lb 1.6 oz (81.7 kg)   LMP 04/03/2004 (Within Years)   SpO2 96%   BMI 34.03 kg/m²   General appearance: alert and cooperative with exam  Neck: No JVD  Lungs: diminished breath sounds bilaterally and rales bibasilar  Heart: regular rate and rhythm, S1, S2 normal, no gallop  Abdomen: Soft, non tender, + BS  Extremities: no cyanosis or clubbing. Trace edema    LABs:  CBC:   Recent Labs      08/28/18   0305  08/29/18   0624   WBC  8.0  12.8*   HGB  8.5*  8.1*   HCT  26.2*  24.6*   PLT  194  226     BMP:   Recent Labs      08/28/18   0305  08/29/18   0624   NA  141  142   K  4.5  4.5   CO2  28  30   BUN  26*  33*   CREATININE  1.66*  1.69*   LABGLOM  30*  30*   GLUCOSE  155*  222*     ABG:  Lab Results   Component Value Date    EIS7ETD 33 06/02/2013    FIO2 45.0 01/15/2017       Lab Results   Component Value Date    POCPH 7.36 06/02/2013    POCPCO2 55 06/02/2013    POCPO2 63 06/02/2013    POCHCO3 31.0 06/02/2013    NBEA NOT REPORTED 06/02/2013    PBEA 6 06/02/2013    DPH4RLJ 33 06/02/2013    GBYY5YPB 90 06/02/2013    FIO2 45.0 01/15/2017     Radiology:  8/28/18      Impression:  · Acute on chronic respiratory failure  · Pulmonary edema/bilateral pleural effusion/diastolic heart failure  · Acute exacerbation of COPD  · 30-pack-year smoking  · Dysphagia/aspiration,?   Aspiration pneumonitis versus pneumonia  · Obstructive sleep apnea/obesity  · Chronic kidney

## 2018-08-29 NOTE — PLAN OF CARE
Problem: Falls - Risk of:  Goal: Will remain free from falls  Will remain free from falls   Outcome: Ongoing  Pt remains free of falls, will continue to monitor

## 2018-08-29 NOTE — PLAN OF CARE
[facility]    Second Visit Note  For more detailed information please refer to the progress note of the day      8/29/2018    5:14 PM    Name:   Yamilet Fitzpatrick  MRN:     [id number]     Acct:      [de-identified]   Room:   2032/2032-01  IP Day:  9  Admit Date:  8/20/2018  1:21 AM    PCP:   Sue Zuniga DO  Code Status:  Full Code        Pt vitals were reviewed   New labs were reviewed   Patient was seen    Updated plan :     1. egd noted  2.  Repeat swallow tomorrow per gi  3. protonix bid and diflucan to start empirically-will need to watch QT interval closely->check daily ekg        Jose Perez DO  8/29/2018  5:14 PM

## 2018-08-29 NOTE — PROGRESS NOTES
Nephrology Progress Note      SUBJECTIVE      Patient was seen and examined. She is on amiodarone for atrial fibrillation and is currently in NSR. Her pressures are more stable. Her BMP is showing stability in the creatinine in the 1.6 mg/dl range. Hemoglobin is 8.1 g/dl today. She has multiple areas of ecchymosis over her body. CT scan of the abdomen was negative for hydronephrosis  Initial serology which we have thus far is coming back negative as well, except for low C3 of doubtful clinical significance. .  Urine studies are indicating tubular damage, especially microscopic hematuria and pyuria  Paraprotein labs are negative  Her creatinine is stabilizing. The clinical picture is consistent with acute interstitial nephritis  Her baseline serum creatinine is 1.2-1.3, follows with Dr. Prem Dewey in the office  Urine and blood cultures negative. CPAP off. 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 Episode of pulmonary edema overnight and non-contrasted CT scan of the chest is consistent with pulmonary edema. She is off the Bipap this morning and is on nasal cannula oxygen and speaking in 4-5 word sentences  Jacinto is in place with I and O as charted. She is diuresing well on Lasix 40 mg IV every 12 hours.     OBJECTIVE      CURRENT TEMPERATURE:  Temp: 98.1 °F (36.7 °C)  MAXIMUM TEMPERATURE OVER 24HRS:  Temp (24hrs), Av.9 °F (36.6 °C), Min:97.7 °F (36.5 °C), Max:98.1 °F (36.7 °C)    CURRENT RESPIRATORY RATE:  Resp: 18  CURRENT PULSE:  Pulse: 79  CURRENT BLOOD PRESSURE:  BP: (!) 120/51  24HR BLOOD PRESSURE RANGE:  Systolic (73ZQZ), GVN:519 , Min:92 , YCK:293   ; Diastolic (95OAR), CSU:02, Min:43, Max:91    24HR INTAKE/OUTPUT:      Intake/Output Summary (Last 24 hours) at 18 0940  Last data filed at 18 0500   Gross per 24 hour   Intake              100 ml   Output             2750 ml   Net            -2650 ml     WEIGHT :  Patient Vitals for the past 96 hrs (Last 3 readings):   Weight   08/29/18 0500 180 lb 1.6 oz (81.7 kg)   08/28/18 0551 191 lb 6 oz (86.8 kg)     PHYSICAL EXAM      GENERAL APPEARANCE: awake and alert and comfortable on nasal cannula oxygen  SKIN: warm and dry, multiple areas of ecchymosis noted  EYES: conjunctivae pale, no scleral icterus   ENT:  moist mucus membranes  NECK:   Thick neck, midline trachea and no accessory muscle use  PULMONARY: Decreased breath sounds in the bases with some crackles as well bilaterally, a little better air movement today  CARDIOVASCULAR: Regular rate and rhythm  With a positive S1 and S2 and no rubs  ABDOMEN: obese and not tender and soft and mildly distended, bowel sounds are active  EXTREMITIES: large areas of firmness and bruising over the right upper arm with no edema or clubbing or cyanosis or calf tenderness    CURRENT MEDICATIONS        morphine (PF) injection 1 mg Q2H PRN   magnesium sulfate 1 g in dextrose 5% 100 mL IVPB Q1H   methylPREDNISolone sodium (SOLU-MEDROL) injection 40 mg Q6H   clindamycin (CLEOCIN) 600 mg in dextrose 5 % 50 mL IVPB Q8H   metoprolol succinate (TOPROL XL) extended release tablet 25 mg Daily   diatrizoate meglumine-sodium (GASTROGRAFIN) 66-10 % solution 30 mL ONCE PRN   levofloxacin (LEVAQUIN) 250 MG/50ML infusion 250 mg Q24H   furosemide (LASIX) injection 40 mg Q12H   ipratropium-albuterol (DUONEB) nebulizer solution 1 ampule TID   levalbuterol (XOPENEX) nebulizer solution 1.25 mg Q4H PRN   calcitRIOL (ROCALTROL) capsule 0.25 mcg Daily   amiodarone (CORDARONE) tablet 200 mg BID   metoprolol (LOPRESSOR) injection 5 mg Q4H PRN   diltiazem 125 mg in dextrose 5 % 125 mL infusion Continuous   acetaminophen (TYLENOL) tablet 650 mg Q4H PRN   ALPRAZolam (XANAX) tablet 0.25 mg TID PRN   aspirin EC tablet 81 mg Daily   atorvastatin (LIPITOR) tablet 20 mg Nightly   mometasone-formoterol (DULERA) 200-5 MCG/ACT inhaler 08/27/18   1725  08/28/18   0305  08/29/18   0624   MG  2.1  2.0  1.7     ALBUMIN:   No results for input(s): LABALBU in the last 72 hours. IRON:    Lab Results   Component Value Date    IRON 76 07/05/2018     IRON SATURATION:    Lab Results   Component Value Date    LABIRON 25 07/05/2018     TIBC:    Lab Results   Component Value Date    TIBC 304 07/05/2018     FERRITIN:    Lab Results   Component Value Date    FERRITIN 598 07/05/2018     RADHA:   Lab Results   Component Value Date    RADHA NEGATIVE 08/21/2018       SPEP:   Lab Results   Component Value Date    PROT 6.7 08/20/2018    PATH ELECTRONICALLY SIGNED. Nomi Dee M.D. 08/22/2018     UPEP:   Lab Results   Component Value Date    TPU 49 08/22/2018    TPU 49 08/22/2018      HEPBSAG:No results found for: HEPBSAG  HEPCAB:  Lab Results   Component Value Date    HEPCAB NONREACTIVE 08/21/2018     MPO ANCA:   Lab Results   Component Value Date    MPO 10 08/21/2018    . PR3 ANCA:    Lab Results   Component Value Date    PR3 7 08/21/2018     URINE SODIUM:    Lab Results   Component Value Date    EDWARD 71 08/22/2018      URINE CREATININE:    Lab Results   Component Value Date    LABCREA 78.1 08/22/2018     URINE PROTEIN:    Lab Results   Component Value Date    TPU 49 08/22/2018    TPU 49 08/22/2018     URINALYSIS:  U/A:   Lab Results   Component Value Date    NITRU NEGATIVE 08/23/2018    COLORU YELLOW 08/23/2018    PHUR 5.5 08/23/2018    WBCUA 5 TO 10 08/23/2018    RBCUA 50  08/23/2018    MUCUS NOT REPORTED 08/23/2018    TRICHOMONAS NOT REPORTED 08/23/2018    YEAST NOT REPORTED 08/23/2018    BACTERIA MODERATE 08/23/2018    SPECGRAV 1.020 08/23/2018    LEUKOCYTESUR TRACE 08/23/2018    UROBILINOGEN Normal 08/23/2018    BILIRUBINUR NEGATIVE 08/23/2018    GLUCOSEU NEGATIVE 08/23/2018    KETUA NEGATIVE 08/23/2018    AMORPHOUS NOT REPORTED 08/23/2018         ASSESSMENT      1. Acute kidney injury :Most likely secondary to acute tubular interstitial nephritis.

## 2018-08-29 NOTE — FLOWSHEET NOTE
08/29/18 0253   Provider Notification   Reason for Communication New orders; Review case   Provider Name Dr. Mateusz Deleon   Provider Notification Physician   Method of Communication Page   Response See orders   Dr. Child Left returned page. Notified that patient is back in afib with chest discomfort. Orders received for amiodarone bolus and Morphine.  See epic

## 2018-08-29 NOTE — OP NOTE
PROCEDURE NOTE    DATE OF PROCEDURE: 8/29/2018     SURGEON: Kiarra Thibodeaux MD  Facility: [facility] [address] [facility]  ASSISTANT: None  Anesthesia: Mac   PREOPERATIVE DIAGNOSIS:   dysphagia with aspiration    Diagnosis:  The esophagus is severely dilated, and was impacted with food up to USP, I had to  clean it piece by piece until we have the entire esophagus cleaned by pushing the food down to the stomach. The esophagus is severely inflamed with large  ulcer at the GE junction, with white thick discharge involving the entire esophagus consistent with Candida gentle biopsies were taken,  Small hiatal hernia    gastritis        POSTOPERATIVE DIAGNOSIS: As described below    OPERATION: Upper GI endoscopy with Biopsy    ANESTHESIA: Moderate Sedation     ESTIMATED BLOOD LOSS: Less than 50 ml    COMPLICATIONS: None. SPECIMENS:  Was Obtained:   As above     HISTORY: The patient is a 72y.o. year old female with history of above preop diagnosis. I recommended esophagogastroduodenoscopy with possible biopsy and I explained the risk, benefits, expected outcome, and alternatives to the procedure. Risks included but are not limited to bleeding, infection, respiratory distress, hypotension, and perforation of the esophagus, stomach, or duodenum. Patient understands and is in agreement. PROCEDURE: The patient was given IV conscious sedation. The patient's SPO2 remained above 90% throughout the procedure. The gastroscope was inserted orally and advanced under direct vision through the esophagus, through the stomach, through the pylorus, and into the descending duodenum. Post sedation note : The patient's SPO2 remained above 90% throughout the procedure. the vital signs remained stable , and no immediate complication form the procedure noted, patient will be ready for d/c when criteria is met .       Findings:    Retropharyngeal area was grossly normal appearing    Esophagus: abnormal: The esophagus is severely dilated, and

## 2018-08-29 NOTE — PROGRESS NOTES
Occupational Therapy  DATE: 2018    NAME: Bart Perdue  MRN: [id number]   : 1945    Patient not seen this date for [address] due to:  [] Blood transfusion in progress  [] Cancel by RN  [] Hemodialysis  []  Refusal by Patient   [] Spine Precautions   [x] Strict Bedrest-per RN pt is on bedrest at this time and will hold OT eval until new activity orders received; cancel OT eval this date    [] Surgery  [] Testing      [] Other        [] PT being discontinued at this time. Patient independent. No further needs. [] PT being discontinued at this time as the patient has been transferred to hospice care. No further needs.     Nolan Wu, OT

## 2018-08-29 NOTE — PROGRESS NOTES
w/removal lesion by hot bx forceps (N/A, 4/25/2017); Cystorrhaphy (04/04/2018); pr cystourethroscopy,biopsies (N/A, 4/4/2018); bronchoscopy (06/05/2018); and pr [facility] incl fluor gdnce dx w/cell washg spx (N/A, 6/5/2018). Restrictions  Restrictions/Precautions  Restrictions/Precautions: General Precautions, Fall Risk, Contact Precautions  Position Activity Restriction  Other position/activity restrictions: pt currently on bedrest Per RN - asking for updated activity orders from Dr. Qing Trujillo. Subjective   General  Chart Reviewed: Yes  Response To Previous Treatment: Not applicable  Family / Caregiver Present: No  Subjective  Subjective: Pt agreeable to bed ex only  General Comment  Comments: RNChucho states pt is okay for bed therapy only  Pain Screening  Patient Currently in Pain: No  Vital Signs  Patient Currently in Pain: No       Orientation     Objective               Balance  Posture: Poor  Sitting - Static: Fair;+  Sitting - Dynamic: Fair  Exercises  Comments: AROM x 4 limbs, AAROM arden SLR      Assessment   Body structures, Functions, Activity limitations: Decreased functional mobility ; Decreased strength;Decreased safe awareness;Decreased endurance;Decreased balance;Decreased coordination  Assessment: Pt displays decreased strenght and endurance   Prognosis: Good  Decision Making: Medium Complexity  Activity Tolerance  Activity Tolerance: Patient Tolerated treatment well     G-Code     OutComes Score     AM-PAC Score  AM-PAC Inpatient Mobility Raw Score : 6  AM-PAC Inpatient T-Scale Score : 23.55  Mobility Inpatient CMS 0-100% Score: 100  Mobility Inpatient CMS G-Code Modifier : CN          Goals  Short term goals  Time Frame for Short term goals: 12 tx's  Short term goal 1: Bed mobiity independent   Short term goal 2: Dangled x 10 minutes   Short term goal 3: Progress function and re eval as pt on bedrest.   Patient Goals   Patient goals : Pt goal is to keep her strength up    Plan    Plan  Times per

## 2018-08-29 NOTE — ANESTHESIA PRE PROCEDURE
vaginally every other day 4/4/18  Yes Andrade Bill MD   metoprolol tartrate (LOPRESSOR) 25 MG tablet Take 0.5 tablets by mouth 2 times daily 3/10/18  Yes Hernandez Iqbal MD   aspirin 81 MG tablet Take 81 mg by mouth daily LAST DOSE 03/31/2018   Yes Historical Provider, MD   rOPINIRole (REQUIP) 2 MG tablet Take 2 mg by mouth 3 times daily   Yes Historical Provider, MD   pantoprazole (PROTONIX) 40 MG tablet Take 1 tablet by mouth 2 times daily (before meals) 1/22/18  Yes Lesly Hill DO   BREO ELLIPTA 100-25 MCG/INH AEPB inhaler Inhale 1 puff into the lungs daily 1/22/18  Yes Lesly Hill DO   ferrous sulfate 325 (65 Fe) MG EC tablet Take 1 tablet by mouth 2 times daily (with meals) 12/21/17  Yes Jose HO Blood, DO   vitamin D (CHOLECALCIFEROL) 5000 units CAPS capsule Take 5,000 Units by mouth daily   Yes Historical Provider, MD   rasagiline mesylate 1 MG TABS Take 1 tablet by mouth daily   Yes Historical Provider, MD   amiodarone (CORDARONE) 200 MG tablet Take 1 tablet by mouth daily 8/27/17  Yes Eliazaranda Marii P Blood, DO   albuterol (PROVENTIL) (2.5 MG/3ML) 0.083% nebulizer solution Take 3 mLs by nebulization every 6 hours as needed for Wheezing 7/18/17  Yes Lesly Hill DO   Oxygen Concentrator Dx: Pneumonia, use as directed. Patient taking differently: Dx: Pneumonia, use as directed.    ON 24/7 2/10/17  Yes Lesly Hill DO   JANUMET  MG per tablet TAKE 1 TABLET TWICE A DAY WITH MEALS 8/28/18   Lesly Hill DO   calcitRIOL (ROCALTROL) 0.25 MCG capsule TAKE 1 CAPSULE THREE TIMES A DAY 8/22/18   Lesly Hill DO   ondansetron (ZOFRAN) 8 MG tablet TK 1 T PO Q 8 H PRF NAUSEA OR VOM 5/17/18   Historical Provider, MD   HYDROcodone-acetaminophen ([facility]) 5-325 MG per tablet TK 1 T PO BID PRF PAIN 7/16/18   Historical Provider, MD       Current medications:    Current Facility-Administered Medications   Medication Dose Route Frequency Provider Last Rate Last Dose    [MAR Hold] morphine (PF) nebulizer solution 1.25 mg  1.25 mg Nebulization Q4H PRN Nils Simmer Orlop, DO   1.25 mg at 08/28/18 0222    [MAR Hold] calcitRIOL (ROCALTROL) capsule 0.25 mcg  0.25 mcg Oral Daily Qamar Jaime MD   0.25 mcg at 08/27/18 0834    [MAR Hold] amiodarone (CORDARONE) tablet 200 mg  200 mg Oral BID Itz Stephens MD   200 mg at 08/27/18 2011    [MAR Hold] metoprolol (LOPRESSOR) injection 5 mg  5 mg Intravenous Q4H PRN ANSHUL Mason CNP   5 mg at 08/29/18 0135    [MAR Hold] diltiazem 125 mg in dextrose 5 % 125 mL infusion  5 mg/hr Intravenous Continuous Nils Simmer Orlop, DO   Stopped at 08/26/18 1029    [MAR Hold] acetaminophen (TYLENOL) tablet 650 mg  650 mg Oral Q4H PRN Nils Tavarez Orlop, DO   650 mg at 08/27/18 0626    [MAR Hold] ALPRAZolam (XANAX) tablet 0.25 mg  0.25 mg Oral TID PRN ANSHUL Mason CNP   0.25 mg at 08/28/18 0200    [MAR Hold] aspirin EC tablet 81 mg  81 mg Oral Daily ANSHUL Amin CNP   81 mg at 08/27/18 0835    [MAR Hold] atorvastatin (LIPITOR) tablet 20 mg  20 mg Oral Nightly ANSHUL Amin CNP   20 mg at 08/27/18 2011    [MAR Hold] mometasone-formoterol (DULERA) 200-5 MCG/ACT inhaler 2 puff  2 puff Inhalation BID ANSHUL Amin CNP   2 puff at 08/29/18 0750    [MAR Hold] carbidopa-levodopa (SINEMET CR)  MG per extended release tablet 1 tablet  1 tablet Oral 4x Daily ANSHUL Amin CNP   1 tablet at 08/27/18 2010    [MAR Hold] conjugated estrogens (PREMARIN) vaginal cream 0.5 g  0.5 g Vaginal Every Other Day ANSHUL Mason CNP   0.5 g at 08/27/18 0838    [MAR Hold] ferrous sulfate EC tablet 325 mg  325 mg Oral BID WC ANSHUL Amin CNP   325 mg at 08/27/18 1721    [MAR Hold] HYDROcodone-acetaminophen (NORCO) 5-325 MG per tablet 1 tablet  1 tablet Oral Q4H PRN ANSHUL Amin CNP   1 tablet at 08/28/18 0200    [MAR Hold] pantoprazole (PROTONIX) tablet 40 mg  40 mg Oral BID AC Lillian Zarate APRROSI - CNP   40 mg at 08/27/18 1458    [MAR Hold] polyethylene glycol (GLYCOLAX) packet 17 g  17 g Oral Daily PRN Lillian AMATO GarcíadanaeANSHUL lozano - CNP        [MAR Hold] rasagiline mesylate TABS 1 mg  1 tablet Oral Daily Lillian Zarate APRROSI - CNP   1 mg at 08/27/18 0913    [MAR Hold] rOPINIRole (REQUIP) tablet 2 mg  2 mg Oral TID Lillian MoralesANSHUL lozano - CNP   2 mg at 08/27/18 2011    [MAR Hold] senna (SENOKOT) tablet 17.2 mg  2 tablet Oral Daily Lillian Ojedaconnie APRROSI - CNP   17.2 mg at 08/27/18 0835    [MAR Hold] vitamin D (CHOLECALCIFEROL) capsule 5,000 Units  5,000 Units Oral Daily Lillian Zarate APRROSI - CNP   5,000 Units at 08/27/18 0835    [MAR Hold] sodium chloride flush 0.9 % injection 10 mL  10 mL Intravenous 2 times per day Lillian MoralesANSHUL lozano - CNP   10 mL at 08/29/18 0725    [MAR Hold] sodium chloride flush 0.9 % injection 10 mL  10 mL Intravenous PRN Lillian AMATO GarcíadanaeANSHUL lozano - CNP        [MAR Hold] glucose (GLUTOSE) 40 % oral gel 15 g  15 g Oral PRN Lillian MoralesANSHUL lozano - CNP        [MAR Hold] dextrose 50 % solution 12.5 g  12.5 g Intravenous PRN Lillian ANSHUL Izquierdo - CNP        [MAR Hold] glucagon (rDNA) injection 1 mg  1 mg Intramuscular PRN Lillian AMATO GarcíadanaeANSHUL lozano - CNP        [MAR Hold] dextrose 5 % solution  100 mL/hr Intravenous PRN Lillian AMATO GarcíadanaeANSHUL lozano - CNP        [MAR Hold] insulin lispro (HUMALOG) injection vial 0-12 Units  0-12 Units Subcutaneous TID WC Lillian AMATO ANSHUL Zarate - CNP   4 Units at 08/29/18 1223    [MAR Hold] insulin lispro (HUMALOG) injection vial 0-6 Units  0-6 Units Subcutaneous Nightly Lillian ANSHUL Izquierdo CNP   1 Units at 08/26/18 2115    [MAR Hold] linagliptin (TRADJENTA) tablet 5 mg  5 mg Oral Daily ANSHUL Amin CNP   5 mg at 08/27/18 0835    [MAR Hold] ondansetron (ZOFRAN-ODT) disintegrating tablet 4 mg  4 mg Oral Q6H PRN DO [facility] Hold] ondansetron (ZOFRAN) injection 4 mg  4 mg Intravenous Q6H PRN Alverta Heir Orlop, DO   4 mg at 08/28/18 0328    [MAR Hold] sodium chloride flush 0.9 % injection 10 mL  10 mL Intravenous 2 times per day Minus MD Aiden   10 mL at 08/29/18 0724    [MAR Hold] sodium chloride flush 0.9 % injection 10 mL  10 mL Intravenous PRN Minus MD Aiden        [facility] Hold] miconazole (MICOTIN) 2 % powder   Topical BID Alverta Heir Orlop, DO        [MAR Hold] promethazine (PHENERGAN) injection 12.5 mg  12.5 mg Intravenous Q6H PRN ANSHUL mAaral - CNP   12.5 mg at 08/27/18 1308       Allergies: Allergies   Allergen Reactions    Dye [Barium-Containing Compounds] Other (See Comments)     Cause Afib per     Pcn [Penicillins] Itching and Swelling    Bactrim [Sulfamethoxazole-Trimethoprim] Other (See Comments)     Allergic Nephritis    Red Dye Itching and Rash     This allergy is likely incorrect:  Per patient's  she has no issue with medications that have red dye in them or red food.  He thinks this reaction was added to chart when the pt had a colonoscopy (possibly barium or iodine dye instead)       Problem List:    Patient Active Problem List   Diagnosis Code    Controlled type 2 diabetes mellitus with stage 3 chronic kidney disease, without long-term current use of insulin ([facility]) E11.22, N18.3    Hyperlipidemia E78.5    Essential hypertension I10    Chronic leg pain M79.606, G89.29    Chronic atrial fibrillation ([facility]) I48.2    Asthma J45.909    Gastroesophageal reflux disease without esophagitis K21.9    ECTOR on CPAP G47.33, Z99.89    Type 2 diabetes mellitus without complication, without long-term current use of insulin ([facility]) E11.9    Spinal stenosis in cervical region M48.02    Hypomagnesemia E83.42    Hyperphosphaturia E83.39    Hypocalcemia E83.51    Parkinson's disease ([facility] G20    Acute renal failure (HCC) N17.9    Mental status change R41.82    Iron deficiency anemia due to chronic blood loss D50.0    Morbid obesity with BMI of 40.0-44.9, adult ([facility]) E66.01, Z68.41    HCAP (healthcare-associated pneumonia) J18.9    Acute respiratory failure with hypercapnia ([facility]) J96.02    Acute on chronic diastolic congestive heart failure ([facility]) I50.33    Hyperplastic polyp of intestine K63.5    Diverticulosis K57.90    Panlobular emphysema ([facility]) J43.1    Rapid atrial fibrillation ([facility]) I48.91    CKD (chronic kidney disease) stage 3, GFR 30-59 ml/min N18.3    Anemia in chronic kidney disease N18.9, D63.1    Chronic respiratory failure with hypoxia and hypercapnia ([facility]) J96.11, J96.12    Acute kidney injury superimposed on chronic kidney disease ([facility]) N17.9, N18.9    CKD stage 3 due to type 2 diabetes mellitus ([facility]) E11.22, N18.3    Nephrolithiasis N20.0    Candidal intertrigo B37.2    Venous insufficiency I87.2    Malabsorption K90.9    Anemia of chronic renal failure, stage 4 (severe) ([facility]) N18.4, D63.1    Iron deficiency E61.1    Coronary atherosclerosis I25.10    COPD exacerbation ([facility]) J44.1    Restless leg syndrome G25.81    SIRS (systemic inflammatory response syndrome) ([facility]) R65.10    Nausea and vomiting in adult R11.2    Bacterial UTI N39.0, A49.9    Dehydration E86.0    Acute hypoxemic respiratory failure ([facility]) J96.01    Hypovolemic shock ([facility]) R57.1    Thrombocytopenia ([facility]) D69.6    Odynophagia R13.10    Esophageal dysphagia R13.10       Past Medical History:        Diagnosis Date    Acute on chronic diastolic congestive heart failure (HCC)     Anesthesia complication     DIFFICULTY WAKING OP    Asthma     Atrial fibrillation ([facility] 2013    Cataracts, bilateral     Cellulitis     BILAT LOWER LEGS-PT STATES IS IMPROVING    Cerebral artery occlusion with cerebral infarction [facility])     Last stroke was February 2017 w/no deficits-TOTAL OF 3    Chronic kidney disease 2013    NOT ON DIALYSIS YET    Constipation     CHRONIC    COPD (chronic

## 2018-08-29 NOTE — PROGRESS NOTES
[facility]    Progress Note    8/29/2018    7:47 AM    Name:   Benjie Hough  MRN:     [id number]     Acct:      [de-identified]   Room:   2032/2032-01   Day:  9  Admit Date:  8/20/2018  1:21 AM    PCP:   Wendy Shaw DO  Code Status:  Full Code    Subjective:     C/C:   Chief Complaint   Patient presents with    Emesis    Nausea     Interval History Status: improved. Less sob than yesterday  Looks improved compared to yesterday  Off bipap, on nc o2 this am  Denies cp/n/v  Failed swallow study; thirsty    Brief History:     Per my partner:   \"This is 37-year-old white female who is noted with a complaint of nausea, vomiting and diarrhea and found have acute kidney injury and hypotension.  She is admitted into the intensive care unit and placed on IV fluids and Levophed drip.  She has recently been on Bactrim as an outpatient for urinary tract infection.     On the evening of August 22, 2018 she developed shortness of breath and respiratory distress requiring BiPAP placement.  Chest x-ray August 23 showing pulmonary edema.  IV fluids were discontinued     On the evening of August 23 round 6:30 PM she developed rapid atrial fibrillation and was placed on Cardizem drip as well as heparin drip. cardiac enzymes are negative.  Throughout the night she converted to sinus rhythm and Cardizem drip was weaned off.  She's had recurrent episodes of atrial fibrillation with rapid response and cardiology has increased her amiodarone to 200 mg twice a day\"    Review of Systems:     Constitutional:  negative for chills, fevers, sweats  Respiratory:  positive for cough, dyspnea on exertion, shortness of breath, wheezing  Cardiovascular:  negative for chest pain, chest pressure/discomfort, palpitations  Gastrointestinal:  negative for abdominal pain, constipation, diarrhea, nausea, vomiting  Neurological:  negative for dizziness, headache    Medications: polyethylene glycol, sodium chloride flush, glucose, dextrose, glucagon (rDNA), dextrose, ondansetron **OR** ondansetron, sodium chloride flush, promethazine    Data:     Past Medical History:   has a past medical history of Acute on chronic diastolic congestive heart failure ([facility] Anesthesia complication; Asthma; Atrial fibrillation ([facility] [facility] Cataracts, bilateral; Cellulitis; [facility]bral [facility] Chronic kidney disease; Constipation; COPD (chronic obstructive pulmonary disease) ([facility] Difficult intravenous access; Diverticulosis; DM2 (diabetes mellitus, type 2) ([facility] Full dentures; GERD (gastroesophageal reflux disease); Headache(784.0); [place] (hard of hearing); Hyperlipidemia; Hyperplastic polyp of intestine; Hypertension; Impaired ambulation; Kidney stone; Morbid obesity with BMI of 40.0-44.9, adult ([facility] ECTOR on CPAP; Osteoarthritis; Parkinson disease ([facility] Restless leg syndrome; Spinal stenosis in cervical region; Type II or unspecified type diabetes mellitus without mention of complication, not stated as uncontrolled; Unspecified sleep apnea; Urethral caruncle; and Wears glasses. Social History:   reports that she quit smoking about 47 years ago. Her smoking use included Cigarettes. She has a 30.00 pack-year smoking history. She has never used smokeless tobacco. She reports that she drinks about 1.2 oz of alcohol per week . She reports that she does not use drugs.      Family History:   Family History   Problem Relation Age of Onset    Heart Failure Mother     Hypertension Mother     Heart Disease Mother     High Blood Pressure Mother        Vitals:  BP (!) 120/51   Pulse 79   Temp 97.7 °F (36.5 °C) (Temporal)   Resp 12   Ht 5' 1\" (1.549 m)   Wt 180 lb 1.6 oz (81.7 kg)   LMP 2004 (Within Years)   SpO2 90%   BMI 34.03 kg/m²   Temp (24hrs), Av.7 °F (36.5 °C), Min:97.3 °F (36.3 °C), Max:97.9 °F (36.6 °C)    Recent Labs      18   0749  18 addition, there appears to be cardiomegaly with bronchovascular   /ground-glass opacities involving the aerated upper lobes with smooth septal   thickening.  Constellation of findings suggest CHF/pulmonary edema. Superimposed infectious process cannot be excluded.  Repeat CT after therapy   is recommended to ensure response of therapy. *Prominent mediastinal lymph nodes.  Finding is nonspecific.  Attention on   follow-up is suggested. *Oral contrast/ingested food material within the distended esophagus to the   level of the proximal esophagus with a moderate size hiatal hernia present. Achalasia at the GE junction cannot be excluded.  No definite obstructing   mass is seen. Barium esophagram:  Impression   1. Suspected aspiration after the administration of water-soluble contrast   when the patient began coughing.  Due to suspected aspiration and risk for   further aspiration, the exam was terminated.  A modified barium swallow is   recommended to rule out aspiration prior to any repeat esophagram.   2. Exam is severely limited as the patient could not stand and did not drink   any contrast beyond the 1st sip due to suspected aspiration and risk for   further aspiration. 3. Visualized proximal esophagus appears patulous and dilated. Swallow study:     Impression   1. Penetration followed by aspiration with the thick liquid, thin liquid, and   pureed/pudding thick substances. 2. Soft solid and cookie solid substances were not attempted.            Physical Examination:        General appearance:  alert, cooperative and no distress  Mental Status:  oriented to person, place and time and normal affect  Lungs:  Reduced rhonchi and wheezes bilaterally, normal effort  Heart:  regular rate and rhythm, no murmur  Abdomen:  soft, nontender, nondistended, normal bowel sounds, no masses, hepatomegaly, splenomegaly  Extremities:  no redness, tenderness in the calves  Skin:  no gross lesions, rashes, induration    Assessment:        Primary Problem  Acute kidney injury superimposed on chronic kidney disease [facility])    Active Hospital Problems    Diagnosis Date Noted    Odynophagia [R13.10] 08/28/2018    Esophageal dysphagia [R13.10]     Hypomagnesemia [E83.42] 08/27/2018    Thrombocytopenia ([facility] [D69.6] 08/25/2018    Hypovolemic shock ([address] [R57.1] 08/24/2018    Acute hypoxemic respiratory failure (HCC) [J96.01] 08/23/2018    Rapid atrial fibrillation (HCC) [I48.91] 08/23/2018    SIRS (systemic inflammatory response syndrome) (HCC) [R65.10] 08/20/2018    Nausea and vomiting in adult [R11.2] 08/20/2018    Acute kidney injury superimposed on chronic kidney disease ([address] [N17.9, N18.9] 08/20/2018    Bacterial UTI [N39.0, A49.9] 08/20/2018    Dehydration [E86.0]     Anemia of chronic renal failure, stage 4 (severe) ([address] [N18.4, D63.1] 04/25/2018    CKD stage 3 due to type 2 diabetes mellitus ([address] [E11.22, N18.3] 03/08/2018    ECTOR on CPAP [G47.33, Z99.89]     Chronic atrial fibrillation ([address] [I48.2] 05/31/2013    Essential hypertension [I10] 06/07/2012    Controlled type 2 diabetes mellitus with stage 3 chronic kidney disease, without long-term current use of insulin ([address] [E11.22, N18.3]        Plan:        1. ST eval and treat  2. Will probably need ng and TF to start  3. egd today  4. Cont steroids and antibiotics  5. Diuresis per renal  6.  Monitor renal function    Corinne Motto Blood, DO  8/29/2018  7:47 AM

## 2018-08-29 NOTE — FLOWSHEET NOTE
08/29/18 0150   Provider Notification   Reason for Communication Review case   Provider Name Dr. Ava Finley   Provider Notification Physician   Method of Communication Page   Response Waiting for response   pt is back in Afib with Heart rate of 100's to 140's. Pt reports mild chest discomfort.  Dr. Ava Finley paged for orders

## 2018-08-29 NOTE — PROGRESS NOTES
96%  RR18  Breath Sounds: DIMINISHED      · Bronchodilator assessment at level  2  · Hyperinflation assessment at level   · Secretion Management assessment at level    ·   · []    Bronchodilator Assessment  BRONCHODILATOR ASSESSMENT SCORE  Score 0 1 2 3 4 5   Breath Sounds   []  Patient Baseline []  No Wheeze good aeration [x]  Faint, scattered wheezing, good aeration []  Expiratory Wheezing and or moderately diminished []  Insp/Exp wheeze and/or very diminished []  Insp/Exp and/ or marked distress   Respiratory Rate   []  Patient Baseline []  Less than 20 [x]  Less than 20 []  20-25 []  Greater than 25 []  Greater than 25   Peak flow % of Pred or PB [x]  NA   []  Greater than 90%  []  81-90% []  71-80% []  Less than or equal to 70%  or unable to perform []  Unable due to Respiratory Distress   Dyspnea re []  Patient Baseline []  No SOB []  No SOB [x]  SOB on exertion []  SOB min activity []  At rest/acute   e FEV% Predicted       []  NA []  Above 69%  []  Unable []  Above 60-69%  [x]  Unable []  Above 50-59%  []  Unable []  Above 35-49%  []  Unable []  Less than 35%  []  Unable                 []  Hyperinflation Assessment  Score 1 2 3   CXR and Breath Sounds   []  Clear []  No atelectasis  Basilar aeration []  Atelectasis or absent basilar breath sounds   Incentive Spirometry Volume  (Per IBW)   []  Greater than or equal to 15ml/Kg []  less than 15ml/Kg []  less than 15ml/Kg   Surgery within last 2 weeks []  None or general   []  Abdominal or thoracic surgery  []  Abdominal or thoracic   Chronic Pulmonary Historyre []  No []  Yes []  Yes     []  Secretion Management Assessment  Score 1 2 3   Bilateral Breath Sounds   []  Occasional Rhonchi []  Scattered Rhonchi []  Course Rhonchi and/or poor aeration   Sputum    []  Small amount of thin secretions []  Moderate amount of viscous secretions []  Cop[place], Viscious Yellow/ Secretions   CXR as reported by physician []  clear  []  Unavailable []  Infiltrates and/or consolidation  []  Unavailable []  Mucus Plugging and or lobar consolidation  []  Unavailable   Cough []  Strong, productive cough []  Weak productive cough []  No cough or weak non-productive cough

## 2018-08-30 ENCOUNTER — APPOINTMENT (OUTPATIENT)
Dept: GENERAL RADIOLOGY | Age: 73
DRG: 871 | End: 2018-08-30
Payer: COMMERCIAL

## 2018-08-30 LAB
ABSOLUTE EOS #: 0 K/UL (ref 0–0.4)
ABSOLUTE IMMATURE GRANULOCYTE: ABNORMAL K/UL (ref 0–0.3)
ABSOLUTE LYMPH #: 0.4 K/UL (ref 1–4.8)
ABSOLUTE MONO #: 0.6 K/UL (ref 0.2–0.8)
ANION GAP SERPL CALCULATED.3IONS-SCNC: 12 MMOL/L (ref 9–17)
BASOPHILS # BLD: 1 % (ref 0–2)
BASOPHILS ABSOLUTE: 0.1 K/UL (ref 0–0.2)
BUN BLDV-MCNC: 38 MG/DL (ref 8–23)
BUN/CREAT BLD: 20 (ref 9–20)
CALCIUM SERPL-MCNC: 8.4 MG/DL (ref 8.6–10.4)
CHLORIDE BLD-SCNC: 99 MMOL/L (ref 98–107)
CO2: 34 MMOL/L (ref 20–31)
CREAT SERPL-MCNC: 1.89 MG/DL (ref 0.5–0.9)
DIFFERENTIAL TYPE: ABNORMAL
EKG ATRIAL RATE: 100 BPM
EKG P AXIS: 53 DEGREES
EKG P-R INTERVAL: 208 MS
EKG Q-T INTERVAL: 358 MS
EKG QRS DURATION: 92 MS
EKG QTC CALCULATION (BAZETT): 461 MS
EKG R AXIS: -10 DEGREES
EKG T AXIS: -12 DEGREES
EKG VENTRICULAR RATE: 100 BPM
EOSINOPHILS RELATIVE PERCENT: 0 % (ref 1–4)
GFR AFRICAN AMERICAN: 32 ML/MIN
GFR NON-AFRICAN AMERICAN: 26 ML/MIN
GFR SERPL CREATININE-BSD FRML MDRD: ABNORMAL ML/MIN/{1.73_M2}
GFR SERPL CREATININE-BSD FRML MDRD: ABNORMAL ML/MIN/{1.73_M2}
GLUCOSE BLD-MCNC: 177 MG/DL (ref 70–99)
GLUCOSE BLD-MCNC: 213 MG/DL (ref 65–105)
GLUCOSE BLD-MCNC: 213 MG/DL (ref 65–105)
GLUCOSE BLD-MCNC: 219 MG/DL (ref 65–105)
GLUCOSE BLD-MCNC: 290 MG/DL (ref 65–105)
HCT VFR BLD CALC: 24.2 % (ref 36–46)
HEMOGLOBIN: 7.8 G/DL (ref 12–16)
IMMATURE GRANULOCYTES: ABNORMAL %
LYMPHOCYTES # BLD: 3 % (ref 24–44)
MAGNESIUM: 1.9 MG/DL (ref 1.6–2.6)
MCH RBC QN AUTO: 33.4 PG (ref 26–34)
MCHC RBC AUTO-ENTMCNC: 32.2 G/DL (ref 31–37)
MCV RBC AUTO: 103.7 FL (ref 80–100)
MONOCYTES # BLD: 4 % (ref 1–7)
NRBC AUTOMATED: ABNORMAL PER 100 WBC
PDW BLD-RTO: 15.5 % (ref 11.5–14.5)
PHOSPHORUS: 4.3 MG/DL (ref 2.6–4.5)
PLATELET # BLD: 242 K/UL (ref 130–400)
PLATELET ESTIMATE: ABNORMAL
PMV BLD AUTO: 7.6 FL (ref 6–12)
POTASSIUM SERPL-SCNC: 4.2 MMOL/L (ref 3.7–5.3)
RBC # BLD: 2.33 M/UL (ref 4–5.2)
RBC # BLD: ABNORMAL 10*6/UL
SEG NEUTROPHILS: 92 % (ref 36–66)
SEGMENTED NEUTROPHILS ABSOLUTE COUNT: 12.3 K/UL (ref 1.8–7.7)
SODIUM BLD-SCNC: 145 MMOL/L (ref 135–144)
WBC # BLD: 13.4 K/UL (ref 3.5–11)
WBC # BLD: ABNORMAL 10*3/UL

## 2018-08-30 PROCEDURE — 85025 COMPLETE CBC W/AUTO DIFF WBC: CPT

## 2018-08-30 PROCEDURE — G8997 SWALLOW GOAL STATUS: HCPCS

## 2018-08-30 PROCEDURE — 93005 ELECTROCARDIOGRAM TRACING: CPT

## 2018-08-30 PROCEDURE — 6360000002 HC RX W HCPCS: Performed by: NURSE PRACTITIONER

## 2018-08-30 PROCEDURE — 6370000000 HC RX 637 (ALT 250 FOR IP): Performed by: NURSE PRACTITIONER

## 2018-08-30 PROCEDURE — 6360000002 HC RX W HCPCS: Performed by: INTERNAL MEDICINE

## 2018-08-30 PROCEDURE — 6370000000 HC RX 637 (ALT 250 FOR IP): Performed by: INTERNAL MEDICINE

## 2018-08-30 PROCEDURE — 2500000003 HC RX 250 WO HCPCS: Performed by: INTERNAL MEDICINE

## 2018-08-30 PROCEDURE — 36415 COLL VENOUS BLD VENIPUNCTURE: CPT

## 2018-08-30 PROCEDURE — 94660 CPAP INITIATION&MGMT: CPT

## 2018-08-30 PROCEDURE — 92526 ORAL FUNCTION THERAPY: CPT

## 2018-08-30 PROCEDURE — 2580000003 HC RX 258: Performed by: INTERNAL MEDICINE

## 2018-08-30 PROCEDURE — 99232 SBSQ HOSP IP/OBS MODERATE 35: CPT | Performed by: INTERNAL MEDICINE

## 2018-08-30 PROCEDURE — 94640 AIRWAY INHALATION TREATMENT: CPT

## 2018-08-30 PROCEDURE — 2060000000 HC ICU INTERMEDIATE R&B

## 2018-08-30 PROCEDURE — 83735 ASSAY OF MAGNESIUM: CPT

## 2018-08-30 PROCEDURE — 80048 BASIC METABOLIC PNL TOTAL CA: CPT

## 2018-08-30 PROCEDURE — G8996 SWALLOW CURRENT STATUS: HCPCS

## 2018-08-30 PROCEDURE — 2580000003 HC RX 258: Performed by: NURSE PRACTITIONER

## 2018-08-30 PROCEDURE — 71045 X-RAY EXAM CHEST 1 VIEW: CPT

## 2018-08-30 PROCEDURE — 92611 MOTION FLUOROSCOPY/SWALLOW: CPT

## 2018-08-30 PROCEDURE — 2700000000 HC OXYGEN THERAPY PER DAY

## 2018-08-30 PROCEDURE — 97530 THERAPEUTIC ACTIVITIES: CPT

## 2018-08-30 PROCEDURE — 74230 X-RAY XM SWLNG FUNCJ C+: CPT

## 2018-08-30 PROCEDURE — 94761 N-INVAS EAR/PLS OXIMETRY MLT: CPT

## 2018-08-30 PROCEDURE — 84100 ASSAY OF PHOSPHORUS: CPT

## 2018-08-30 PROCEDURE — 97110 THERAPEUTIC EXERCISES: CPT

## 2018-08-30 PROCEDURE — 2580000003 HC RX 258: Performed by: RADIOLOGY

## 2018-08-30 PROCEDURE — C9113 INJ PANTOPRAZOLE SODIUM, VIA: HCPCS | Performed by: INTERNAL MEDICINE

## 2018-08-30 PROCEDURE — 82947 ASSAY GLUCOSE BLOOD QUANT: CPT

## 2018-08-30 RX ORDER — PREDNISONE 20 MG/1
20 TABLET ORAL 2 TIMES DAILY
Status: DISCONTINUED | OUTPATIENT
Start: 2018-08-30 | End: 2018-09-02

## 2018-08-30 RX ORDER — METOPROLOL TARTRATE 5 MG/5ML
5 INJECTION INTRAVENOUS EVERY 6 HOURS
Status: DISCONTINUED | OUTPATIENT
Start: 2018-08-30 | End: 2018-08-31

## 2018-08-30 RX ORDER — FUROSEMIDE 10 MG/ML
40 INJECTION INTRAMUSCULAR; INTRAVENOUS DAILY
Status: DISCONTINUED | OUTPATIENT
Start: 2018-08-31 | End: 2018-08-31

## 2018-08-30 RX ORDER — SODIUM CHLORIDE 9 MG/ML
INJECTION, SOLUTION INTRAVENOUS CONTINUOUS
Status: DISCONTINUED | OUTPATIENT
Start: 2018-08-30 | End: 2018-08-30

## 2018-08-30 RX ORDER — DEXTROSE MONOHYDRATE 50 MG/ML
INJECTION, SOLUTION INTRAVENOUS CONTINUOUS
Status: DISCONTINUED | OUTPATIENT
Start: 2018-08-30 | End: 2018-08-30

## 2018-08-30 RX ORDER — LORAZEPAM 2 MG/ML
1 INJECTION INTRAMUSCULAR EVERY 4 HOURS PRN
Status: DISCONTINUED | OUTPATIENT
Start: 2018-08-30 | End: 2018-09-07 | Stop reason: HOSPADM

## 2018-08-30 RX ORDER — DILTIAZEM HYDROCHLORIDE 60 MG/1
30 TABLET, FILM COATED ORAL EVERY 6 HOURS SCHEDULED
Status: DISCONTINUED | OUTPATIENT
Start: 2018-08-30 | End: 2018-09-01

## 2018-08-30 RX ADMIN — METOPROLOL TARTRATE 5 MG: 5 INJECTION, SOLUTION INTRAVENOUS at 11:08

## 2018-08-30 RX ADMIN — Medication 10 ML: at 07:47

## 2018-08-30 RX ADMIN — FUROSEMIDE 40 MG: 10 INJECTION, SOLUTION INTRAMUSCULAR; INTRAVENOUS at 11:07

## 2018-08-30 RX ADMIN — FERROUS SULFATE TAB EC 325 MG (65 MG FE EQUIVALENT) 325 MG: 325 (65 FE) TABLET DELAYED RESPONSE at 17:57

## 2018-08-30 RX ADMIN — CARBIDOPA AND LEVODOPA 1 TABLET: 25; 100 TABLET, EXTENDED RELEASE ORAL at 21:09

## 2018-08-30 RX ADMIN — PREDNISONE 20 MG: 20 TABLET ORAL at 12:57

## 2018-08-30 RX ADMIN — CHOLECALCIFEROL CAP 125 MCG (5000 UNIT) 5000 UNITS: 125 CAP at 17:57

## 2018-08-30 RX ADMIN — CARBIDOPA AND LEVODOPA 1 TABLET: 25; 100 TABLET, EXTENDED RELEASE ORAL at 12:43

## 2018-08-30 RX ADMIN — METHYLPREDNISOLONE SODIUM SUCCINATE 30 MG: 40 INJECTION, POWDER, FOR SOLUTION INTRAMUSCULAR; INTRAVENOUS at 02:02

## 2018-08-30 RX ADMIN — AMIODARONE HYDROCHLORIDE 150 MG: 1.5 INJECTION, SOLUTION INTRAVENOUS at 07:58

## 2018-08-30 RX ADMIN — Medication 10 ML: at 08:34

## 2018-08-30 RX ADMIN — PANTOPRAZOLE SODIUM 40 MG: 40 INJECTION, POWDER, FOR SOLUTION INTRAVENOUS at 21:07

## 2018-08-30 RX ADMIN — PREDNISONE 20 MG: 20 TABLET ORAL at 21:09

## 2018-08-30 RX ADMIN — Medication 10 ML: at 12:58

## 2018-08-30 RX ADMIN — METOPROLOL TARTRATE 5 MG: 5 INJECTION, SOLUTION INTRAVENOUS at 17:57

## 2018-08-30 RX ADMIN — LORAZEPAM 1 MG: 2 INJECTION, SOLUTION INTRAMUSCULAR; INTRAVENOUS at 11:06

## 2018-08-30 RX ADMIN — Medication 10 ML: at 21:00

## 2018-08-30 RX ADMIN — DILTIAZEM HYDROCHLORIDE 30 MG: 60 TABLET, FILM COATED ORAL at 21:08

## 2018-08-30 RX ADMIN — AMIODARONE HYDROCHLORIDE 200 MG: 200 TABLET ORAL at 21:09

## 2018-08-30 RX ADMIN — IPRATROPIUM BROMIDE AND ALBUTEROL SULFATE 1 AMPULE: .5; 3 SOLUTION RESPIRATORY (INHALATION) at 09:21

## 2018-08-30 RX ADMIN — SODIUM CHLORIDE: 9 INJECTION, SOLUTION INTRAVENOUS at 04:08

## 2018-08-30 RX ADMIN — ROPINIROLE HYDROCHLORIDE 2 MG: 2 TABLET, FILM COATED ORAL at 12:43

## 2018-08-30 RX ADMIN — ROPINIROLE HYDROCHLORIDE 2 MG: 2 TABLET, FILM COATED ORAL at 21:09

## 2018-08-30 RX ADMIN — ONDANSETRON HYDROCHLORIDE 4 MG: 2 INJECTION, SOLUTION INTRAMUSCULAR; INTRAVENOUS at 07:46

## 2018-08-30 RX ADMIN — FLUCONAZOLE 200 MG: 2 INJECTION, SOLUTION INTRAVENOUS at 21:05

## 2018-08-30 RX ADMIN — INSULIN LISPRO 3 UNITS: 100 INJECTION, SOLUTION INTRAVENOUS; SUBCUTANEOUS at 21:33

## 2018-08-30 RX ADMIN — MICONAZOLE NITRATE: 20.6 POWDER TOPICAL at 21:00

## 2018-08-30 RX ADMIN — ATORVASTATIN CALCIUM 20 MG: 20 TABLET, FILM COATED ORAL at 21:09

## 2018-08-30 RX ADMIN — INSULIN LISPRO 2 UNITS: 100 INJECTION, SOLUTION INTRAVENOUS; SUBCUTANEOUS at 11:07

## 2018-08-30 RX ADMIN — IPRATROPIUM BROMIDE AND ALBUTEROL SULFATE 1 AMPULE: .5; 3 SOLUTION RESPIRATORY (INHALATION) at 20:00

## 2018-08-30 RX ADMIN — CARBIDOPA AND LEVODOPA 1 TABLET: 25; 100 TABLET, EXTENDED RELEASE ORAL at 17:57

## 2018-08-30 RX ADMIN — DEXTROSE MONOHYDRATE: 50 INJECTION, SOLUTION INTRAVENOUS at 12:54

## 2018-08-30 RX ADMIN — MORPHINE SULFATE 1 MG: 2 INJECTION, SOLUTION INTRAMUSCULAR; INTRAVENOUS at 21:10

## 2018-08-30 RX ADMIN — RASAGILINE 1 MG: 1 TABLET ORAL at 21:00

## 2018-08-30 RX ADMIN — IPRATROPIUM BROMIDE AND ALBUTEROL SULFATE 1 AMPULE: .5; 3 SOLUTION RESPIRATORY (INHALATION) at 15:14

## 2018-08-30 RX ADMIN — MORPHINE SULFATE 1 MG: 2 INJECTION, SOLUTION INTRAMUSCULAR; INTRAVENOUS at 16:38

## 2018-08-30 RX ADMIN — CLINDAMYCIN PHOSPHATE 600 MG: 600 INJECTION, SOLUTION INTRAVENOUS at 16:38

## 2018-08-30 RX ADMIN — LORAZEPAM 1 MG: 2 INJECTION, SOLUTION INTRAMUSCULAR; INTRAVENOUS at 04:07

## 2018-08-30 RX ADMIN — PANTOPRAZOLE SODIUM 40 MG: 40 INJECTION, POWDER, FOR SOLUTION INTRAVENOUS at 08:33

## 2018-08-30 RX ADMIN — INSULIN LISPRO 4 UNITS: 100 INJECTION, SOLUTION INTRAVENOUS; SUBCUTANEOUS at 17:56

## 2018-08-30 RX ADMIN — SODIUM CHLORIDE: 9 INJECTION, SOLUTION INTRAVENOUS at 07:45

## 2018-08-30 RX ADMIN — MICONAZOLE NITRATE: 20.6 POWDER TOPICAL at 12:47

## 2018-08-30 RX ADMIN — INSULIN LISPRO 4 UNITS: 100 INJECTION, SOLUTION INTRAVENOUS; SUBCUTANEOUS at 12:41

## 2018-08-30 RX ADMIN — SENNOSIDES 17.2 MG: 8.6 TABLET, FILM COATED ORAL at 12:57

## 2018-08-30 RX ADMIN — CLINDAMYCIN PHOSPHATE 600 MG: 600 INJECTION, SOLUTION INTRAVENOUS at 08:34

## 2018-08-30 RX ADMIN — LEVOFLOXACIN 250 MG: 5 INJECTION, SOLUTION INTRAVENOUS at 17:57

## 2018-08-30 ASSESSMENT — PAIN SCALES - GENERAL
PAINLEVEL_OUTOF10: 0
PAINLEVEL_OUTOF10: 7
PAINLEVEL_OUTOF10: 10
PAINLEVEL_OUTOF10: 6
PAINLEVEL_OUTOF10: 4
PAINLEVEL_OUTOF10: 0
PAINLEVEL_OUTOF10: 10

## 2018-08-30 ASSESSMENT — PAIN DESCRIPTION - PAIN TYPE
TYPE: CHRONIC PAIN

## 2018-08-30 ASSESSMENT — PAIN DESCRIPTION - ORIENTATION: ORIENTATION: OTHER (COMMENT)

## 2018-08-30 ASSESSMENT — PAIN DESCRIPTION - LOCATION
LOCATION: GENERALIZED

## 2018-08-30 NOTE — PROCEDURES
INSTRUMENTAL SWALLOW REPORT  MODIFIED BARIUM SWALLOW    NAME: Bart Perdue   : 1945  MRN: [id number]       Date of Eval: 2018              Referring Diagnosis(es):      Past Medical History:  has a past medical history of Acute on chronic diastolic congestive heart failure ([facility] Anesthesia complication; Asthma; Atrial fibrillation ([facility] Cataracts, bilateral; Cellulitis; Cerebral artery occlusion with cerebral infarction ([email or web address] Chronic kidney disease; Constipation; COPD (chronic obstructive pulmonary disease) ([facility] Difficult intravenous access; Diverticulosis; DM2 (diabetes mellitus, type 2) ([email or web address] Full dentures; GERD (gastroesophageal reflux disease); Headache(784.0); [place] (hard of hearing); Hyperlipidemia; Hyperplastic polyp of intestine; Hypertension; Impaired ambulation; Kidney stone; Morbid obesity with BMI of 40.0-44.9, adult ([email or web address] ECTOR on CPAP; Osteoarthritis; Parkinson disease ([email or web address] Restless leg syndrome; Spinal stenosis in cervical region; Type II or unspecified type diabetes mellitus without mention of complication, not stated as uncontrolled; Unspecified sleep apnea; Urethral caruncle; and Wears glasses. Past Surgical History:  has a past surgical history that includes Cervical disc surgery (); Appendectomy; Tonsillectomy and adenoidectomy (); hernia repair (); eye surgery (Bilateral, 2017); Cervical spine surgery (13); laminectomy (2013); Upper gastrointestinal endoscopy (2016); Colonoscopy (); Colonoscopy (2016); Colonoscopy (2016); Colonoscopy (2017); pr colsc flx w/removal lesion by hot bx forceps (N/A, 2017); Cystorrhaphy (2018); pr cystourethroscopy,biopsies (N/A, 2018); bronchoscopy (2018); pr brnchsc incl fluor gdnce dx w/cell washg spx (N/A, 2018); Upper gastrointestinal endoscopy (2018); and Upper gastrointestinal endoscopy (N/A, 2018).     Current Diet Solid Consistency: NPO  Current Diet Liquid Consistency: NPO       Type of Study: Repeat MBS         Patient Complaints/Reason for Referral:  Vin Panda was referred for a MBS to assess the efficiency of his/her swallow function, assess for aspiration, and to make recommendations regarding safe dietary consistencies, effective compensatory strategies, and safe eating environment. Onset of problem:     The patient is a 72 y.o.  Non-/non  female who presents with Emesis and Nausea   and she is admitted to the hospital for the management of of N/V and diarrhea.  This is a 72year old white female that has not felt well for several days. Symptoms started after Aranesp shot on Tuesday. She had intractable nausea without relief from Zofran at home.  She's been unable to keep liquids down and has had loss of appetite.  She otherwise denies any abdominal pain or other associated symptoms.  Her symptoms worsen after eating.  On arrival here she was found to have hypotension and acute on chronic kidney injury.  She's been placed on norepinephrine for her hypertension and started on IV fluids. Behavior/Cognition/Vision/Hearing:  Behavior/Cognition: Alert; Cooperative  Vision: Impaired  Hearing: Within functional limits    Impressions:  Patient presents with safe swallow for Dental Soft diet with thin liquids when up at 90 degrees as evidenced by no  aspiration noted with consistencies tested. Recommend small sips and bites, only feed when alert and awake and upright at 90 degrees for all PO intake. Recommend close monitoring for overt/clinical s/s of aspiration and D/C PO intake and complete Modified Barium Swallow Study should they occur. Results and recommendations reported to RN. Patient Position: Lateral and Patient Degrees: 90      Consistencies Administered: Soft solid;Puree; Thin cup;Nectar  teaspoon        Recommended Diet:  Solid consistency: Dental Soft;Dysphagia III Advanced 90 degrees for all PO  Liquid consistency: Thin  Liquid administration via: Cup (small sips at 90 degrees)    Medication administration: PO    Safe Swallow Protocol:  Supervision: Close  Compensatory Swallowing Strategies: Alternate solids and liquids;Eat/Feed slowly;Upright as possible for all oral intake;Small bites/sips    Recommendations/Treatment  Requires SLP Intervention: Yes        D/C Recommendations: Inpatient rehabilitation  Postural Changes and/or Swallow Maneuvers: Upright 90 degrees      Recommended Exercises:    Therapeutic Interventions: Diet tolerance monitoring; Laryngeal exercises; Pharyngeal exercises    Education: Images and recommendations were reviewed with pt following this exam.   Patient Education: yes  Patient Education Response: Verbalizes understanding    Prognosis  Prognosis for safe diet advancement: good      Goals:    Long Term:     To Maximize safety with intake, optimize nutrition/hydration and minimize risk for aspiration. Short Term:     Goal 1: O/M treatment program for dysphagia  Goal 2: The patient will tolerate recommended diet without observed clinical signs of aspiration      Oral Preparation / Oral Phase  Oral Phase: Impaired  Oral Phase: Pt. edentulous for eval.  pt with extended mastication, however functional for soft solids and liquids. Pharyngeal Phase  Pharyngeal Phase: WFL    Pharyngeal: Thin, Thick and soft Solids: + flash penetration with no aspiration and min stasis. Puree: No penetration with no aspiration and min stasis    Dysphagia Outcome Severity Scale: Level 5: Mild dysphagia- Distant supervision.  May need one diet consistency restricted  Penetration-Aspiration Scale (PAS): 2 - Material enters the airway, remains above the vocal folds, and is ejected from the airway    Esophageal Phase  Esophageal Screen: [facility]        Pain   Patient Currently in Pain: No  Pain Level: 6  Pain Type: Chronic pain  Pain Location: Generalized    G-Code:  SLP G-Codes  Functional Limitations:

## 2018-08-30 NOTE — PROGRESS NOTES
by endurance     G-Code     OutComes Score       AM-PAC Score  AM-PAC Inpatient Mobility Raw Score : 7  AM-PAC Inpatient T-Scale Score : 26.42  Mobility Inpatient CMS 0-100% Score: 92.36  Mobility Inpatient CMS G-Code Modifier : CM          Goals  Short term goals  Time Frame for Short term goals: 12 tx's  Short term goal 1: Bed mobiity independent   Short term goal 2: Dangled x 10 minutes   Short term goal 3: Progress function and re eval as pt on bedrest.   Patient Goals   Patient goals : Pt goal is to keep her strength up    Plan    Plan  Times per week: 1-2x/day. 5-6x/week. Current Treatment Recommendations: Strengthening, ROM, Safety Education & Training, Positioning, Patient/Caregiver Education & Training, Home Exercise Program  Safety Devices  Type of devices:  All fall risk precautions in place, Call light within reach, Patient at risk for falls, Left in bed     Therapy Time   Individual Concurrent Group Co-treatment   Time In 0920         Time Out 0937         Minutes [address], Student Physical Therapist Assistant

## 2018-08-30 NOTE — PROGRESS NOTES
N18.4, D63.1    Iron deficiency E61.1    Coronary atherosclerosis I25.10    COPD exacerbation (HCC) J44.1    Restless leg syndrome G25.81    SIRS (systemic inflammatory response syndrome) (HCC) R65.10    Nausea and vomiting in adult R11.2    Bacterial UTI N39.0, A49.9    Dehydration E86.0    Acute hypoxemic respiratory failure (HCC) J96.01    Hypovolemic shock (HCC) R57.1    Thrombocytopenia (HCC) D69.6    Odynophagia R13.10    Esophageal dysphagia R13.10       Pain: 0/10    Dysphagia Treatment  Treatment time:5190-7082    Subjective: [x] Alert [] Cooperative     [] Confused     [] Agitated    [] Lethargic    Objective/Assessment:    Pt. Seen for O/M treatment program following MBSS which revealed flash penetration of liquids. Pt. Completed O/M exercises X 8 X 1 sets with mod cues. Pt. Unable to complete Chela with max cues. Education provided re: safe swallow strategies and need to remain 90 degrees for all PO intake to reduce aspiration risk. Plan:  [x] Continue ST services    [] Discharge from :        Discharge recommendations: [x] Inpatient Rehab   [] [place]   [] Outpatient Therapy  [] Follow up at trauma clinic   [] Other:         Treatment completed by:  Heather Ochoa M.S. [address] [facility], [place], operated by [facility]

## 2018-08-30 NOTE — PROGRESS NOTES
Pulmonary Critical Care Progress Note  Irene Fan MD     Patient seen for the follow up of Acute on chronic respiratory failure, acute exacerbation COPD, pulmonary edema, pleural effusion, obstructive sleep apnea, Acute kidney injury superimposed on chronic kidney disease ([address]     Subjective:  She denies chest pain. Mild occasional cough, mostly dry. Shortness of breath is getting better. She just came back from her swallow study which she passed with recommended dysphagia diet. Examination:  Vitals: /89   Pulse 149   Temp 97.7 °F (36.5 °C) (Temporal)   Resp 16   Ht 5' 1\" (1.549 m)   Wt 180 lb 9.6 oz (81.9 kg)   LMP 04/03/2004 (Within Years)   SpO2 97%   BMI 34.12 kg/m²   General appearance: alert and cooperative with exam  Neck: No JVD  Lungs: diminished breath sounds bilaterally and rales bibasilar  Heart: regular rate and rhythm, S1, S2 normal, no gallop  Abdomen: Soft, non tender, + BS  Extremities: no cyanosis or clubbing. Trace edema    LABs:  CBC:   Recent Labs      08/29/18   0624  08/30/18   0614   WBC  12.8*  13.4*   HGB  8.1*  7.8*   HCT  24.6*  24.2*   PLT  226  242     BMP:   Recent Labs      08/29/18   0624  08/30/18   0614   NA  142  145*   K  4.5  4.2   CO2  30  34*   BUN  33*  38*   CREATININE  1.69*  1.89*   LABGLOM  30*  26*   GLUCOSE  222*  177*     ABG:  Lab Results   Component Value Date    AKY1HGW 33 06/02/2013    FIO2 45.0 01/15/2017       Lab Results   Component Value Date    POCPH 7.36 06/02/2013    POCPCO2 55 06/02/2013    POCPO2 63 06/02/2013    POCHCO3 31.0 06/02/2013    NBEA NOT REPORTED 06/02/2013    PBEA 6 06/02/2013    TTF0NYW 33 06/02/2013    URSK6LDA 90 06/02/2013    FIO2 45.0 01/15/2017     Radiology:  8/30/18      Impression:  · Acute on chronic respiratory failure  · Pulmonary edema/bilateral pleural effusion/diastolic heart failure  · Acute exacerbation of COPD  · 30-pack-year smoking  · Dysphagia/aspiration,?   Aspiration pneumonitis versus

## 2018-08-30 NOTE — FLOWSHEET NOTE
Patient sleeping; no family present.  prayed for patient; left note of support on tray table. Spiritual Care will follow as needed.      08/30/18 1329   Encounter Summary   Services provided to: Patient   Referral/Consult From: Juan   Continue Visiting (8/30/18 sleeping)   Complexity of Encounter Low   Length of Encounter 15 minutes   Routine   Type Follow up   Assessment Sleeping   Intervention Prayer  (Left note)   Outcome Did not respond

## 2018-08-30 NOTE — PROGRESS NOTES
Nephrology Progress Note      SUBJECTIVE    Patient was seen and examined. She is on amiodarone for atrial fibrillation and is currently in NSR. Her pressures are more stable. Her creatinine higher at 1.89 mg/dl. Hemoglobin is 7.8 g/dl today. She has multiple areas of ecchymosis over her body. CT scan of the abdomen was negative for hydronephrosis  Initial serology which we have thus far is coming back negative as well, except for low C3 of doubtful clinical significance. .  Urine studies are indicating tubular damage, especially microscopic hematuria and pyuria  Paraprotein labs are negative  The clinical picture is consistent with acute interstitial nephritis  Her baseline serum creatinine is 1.2-1.3, follows with Dr. Karthik Melton in the office  Urine and blood cultures negative. CPAP off. 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 Jacinto is in place with I and O as charted. She is diuresing well on Lasix 40 mg IV daily.     OBJECTIVE      CURRENT TEMPERATURE:  Temp: 97.5 °F (36.4 °C)  MAXIMUM TEMPERATURE OVER 24HRS:  Temp (24hrs), Av.7 °F (36.5 °C), Min:97.2 °F (36.2 °C), Max:98.4 °F (36.9 °C)    CURRENT RESPIRATORY RATE:  Resp: 19  CURRENT PULSE:  Pulse: 71  CURRENT BLOOD PRESSURE:  BP: 117/67  24HR BLOOD PRESSURE RANGE:  Systolic (37OKH), CTS:414 , Min:88 , KSN:867   ; Diastolic (72ARO), XYC:63, Min:43, Max:126    24HR INTAKE/OUTPUT:      Intake/Output Summary (Last 24 hours) at 18 1235  Last data filed at 18 1130   Gross per 24 hour   Intake              740 ml   Output             1750 ml   Net            -1010 ml     WEIGHT :  Patient Vitals for the past 96 hrs (Last 3 readings):   Weight   18 0500 180 lb 9.6 oz (81.9 kg)   18 0500 180 lb 1.6 oz (81.7 kg)   18 0551 191 lb 6 oz (86.8 kg)     PHYSICAL EXAM      GENERAL APPEARANCE: awake and alert and CR)  MG per extended release tablet 1 tablet 4x Daily   conjugated estrogens (PREMARIN) vaginal cream 0.5 g Every Other Day   ferrous sulfate EC tablet 325 mg BID WC   polyethylene glycol (GLYCOLAX) packet 17 g Daily PRN   rasagiline mesylate TABS 1 mg Daily   rOPINIRole (REQUIP) tablet 2 mg TID   senna (SENOKOT) tablet 17.2 mg Daily   vitamin D (CHOLECALCIFEROL) capsule 5,000 Units Daily   sodium chloride flush 0.9 % injection 10 mL 2 times per day   sodium chloride flush 0.9 % injection 10 mL PRN   glucose (GLUTOSE) 40 % oral gel 15 g PRN   dextrose 50 % solution 12.5 g PRN   glucagon (rDNA) injection 1 mg PRN   dextrose 5 % solution PRN   insulin lispro (HUMALOG) injection vial 0-12 Units TID WC   insulin lispro (HUMALOG) injection vial 0-6 Units Nightly   linagliptin (TRADJENTA) tablet 5 mg Daily   ondansetron (ZOFRAN-ODT) disintegrating tablet 4 mg Q6H PRN   Or    ondansetron (ZOFRAN) injection 4 mg Q6H PRN   sodium chloride flush 0.9 % injection 10 mL 2 times per day   sodium chloride flush 0.9 % injection 10 mL PRN   miconazole (MICOTIN) 2 % powder BID   promethazine (PHENERGAN) injection 12.5 mg Q6H PRN         LABS      CBC:   Recent Labs      08/28/18 0305 08/29/18 0624 08/30/18   0614   WBC  8.0  12.8*  13.4*   RBC  2.52*  2.38*  2.33*   HGB  8.5*  8.1*  7.8*   HCT  26.2*  24.6*  24.2*   MCV  103.9*  103.4*  103.7*   MCH  33.6  34.1*  33.4   MCHC  32.3  33.0  32.2   RDW  15.2*  14.4  15.5*   PLT  194  226  242   MPV  7.7  NOT REPORTED  7.6      BMP:   Recent Labs      08/28/18 0305 08/29/18 0624 08/30/18   0614   NA  141  142  145*   K  4.5  4.5  4.2   CL  99  98  99   CO2  28  30  34*   BUN  26*  33*  38*   CREATININE  1.66*  1.69*  1.89*   GLUCOSE  155*  222*  177*   CALCIUM  9.5  8.8  8.4*      MAGNESIUM:   Recent Labs      08/28/18   0305  08/29/18   0624  08/30/18   0614   MG  2.0  1.7  1.9     ALBUMIN:   No results for input(s): LABALBU in the last 72 hours.   IRON:    Lab Results in the admission and has stabilized the last 2 days in the 1.6 mg/dl range. Baseline creatinine previously was thought to be 1.3 mg/dl. 2.  Hypertension: Blood pressures are normal at this time with the patient currently in NSR  3.  Baseline CKD stage III with a creatinine of 1.2-1.3 range Secondary to diabetic nephrosclerosis, follows up with Dr Maria Isabel Keita. 4.  Hypernatremia. 5.  Hyperkalemia secondary to HPI, recent Bactrim use. Potassium is much better with improvement in renal function and diuretic use  6. Significant pulmonary edema with 3 doses of Lasix 20 mg IV push given yesterday and Lasix 40 mg IV push every 12 hours started by me last pm.   7. Anemia of chronic disease and blood loss, has been getting Aranesp as an outpatient  8. Hypomagnesemia  9. Proteinuria with a previous UPC of 0.5-0.7    PLAN    1. Aranesp 100 mcg every 2 weeks with next scheduled dose on 9/11/18  2. Monitor the BMP daily, and CBC and magnesium and phosphorus  3. Close hemodynamic monitoring  4. Lasix 40 mg IV push daily to continue for pulmonary edema. Close I and O monitoring. Will start D5W to replete water deficit. 5.  Follow-up labs ordered. We will continue to closely follow with you. 6.  BP OK, HR controlled at this time, cardiology following      Please do not hesitate to call with questions.     Electronically signed by Mir Caballero MD on 8/30/2018 at 12:35 PM

## 2018-08-30 NOTE — PROGRESS NOTES
[address] [facility] [place]    Progress Note    8/30/2018    8:56 AM    Name:   Edwin Hampton  MRN:     [id number]     Acct:      [de-identified]   Room:   2032/2032-01  IP Day:  10  Admit Date:  8/20/2018  1:21 AM    PCP:   Lio Puri DO  Code Status:  Full Code    Subjective:     C/C:   Chief Complaint   Patient presents with    Emesis    Nausea     Interval History Status: worsened. Developed rapid afib again this am around shift change-developed cp and palpitations  No sob/n/v    Brief History:     Per my partner: \"This is 82-year-old white female who is noted with a complaint of nausea, vomiting and diarrhea and found have acute kidney injury and hypotension.  She is admitted into the intensive care unit and placed on IV fluids and Levophed drip.  She has recently been on Bactrim as an outpatient for urinary tract infection.     On the evening of August 22, 2018 she developed shortness of breath and respiratory distress requiring BiPAP placement.  Chest x-ray August 23 showing pulmonary edema.  IV fluids were discontinued     On the evening of August 23 round 6:30 PM she developed rapid atrial fibrillation and was placed on Cardizem drip as well as heparin drip. cardiac enzymes are negative.  Throughout the night she converted to sinus rhythm and Cardizem drip was weaned off.  She's had recurrent episodes of atrial fibrillation with rapid response and cardiology has increased her amiodarone to 200 mg twice a day\"    Review of Systems:     Constitutional:  negative for chills, fevers, sweats  Respiratory:  negative for cough, dyspnea on exertion, shortness of breath, wheezing  Cardiovascular:  negative for lower extremity edema, palpitations  Gastrointestinal:  negative for abdominal pain, constipation, diarrhea, nausea, vomiting  Neurological:  negative for dizziness, headache    Medications: Allergies:     Allergies   Allergen Reactions    Dye

## 2018-08-30 NOTE — PROGRESS NOTES
Cardiovascular progress Note          Patient name: Darryle Buggy    YOB: 1945  Date of admission:  8/20/2018       Patient seen, examined. Previous clinical entries reviewed. All available laboratory, imaging and ancillary data reviewed. Subjective:      She had an endoscopy which showed evidence of esophagitis and a gastric ulcer. She did have paroxysmal episodes of atrial fibrillation . She denies any,orthopnea or PND. Systems review:  Constitutional: No fever/chills. HENT: No headache, neck pain or neck stiffness. No sore throat or dysphagia. Gastrointestinal: No abdominal pain, nausea or vomiting. Cardiac: As Above  Respiratory: As above  Neurologic: No new focal weakness or numbness  Psychiatric: Normal mood and mentation       Examination:   Vitals: BP (!) 130/52   Pulse 84   Temp 97.3 °F (36.3 °C) (Temporal)   Resp 18   Ht 5' 1\" (1.549 m)   Wt 180 lb 1.6 oz (81.7 kg)   LMP 04/03/2004 (Within Years)   SpO2 97%   BMI 34.03 kg/m²     Intake/Output Summary (Last 24 hours) at 08/29/18 2151  Last data filed at 08/29/18 1900   Gross per 24 hour   Intake              500 ml   Output             2200 ml   Net            -1700 ml       General appearance: Comfortable in no apparent distress. HEENT: No pallor. No icterus  Neck: Supple. Lungs:Generally decreased breath sounds. + Crackles. Heart: S1,S2  Abdomen: Soft  Extremities: + peripheral edema  Skin: No obvious rashes. Musculoskeletal: No obvious deformities. Neurologic: No focal deficits.      Labs/ Ancillary data:     CBC:   Recent Labs      08/27/18 0458  08/28/18   0305  08/29/18 0624   WBC  5.7  8.0  12.8*   HGB  7.4*  8.5*  8.1*   PLT  159  194  226     BMP:    Recent Labs      08/27/18 0458  08/28/18   0305  08/29/18   0624   NA  143  141  142   K  4.4  4.5  4.5   CL  104  99  98   CO2  29  28  30   BUN  32*  26*  33*   CREATININE  2.00*  1.66*  1.69*   GLUCOSE  151*  155*  222*       Medications:   Scheduled Meds:   [START ON 8/30/2018] methylPREDNISolone  30 mg Intravenous Q12H    pantoprazole  40 mg Intravenous BID    And    sodium chloride (PF)  10 mL Intravenous BID    fluconazole  200 mg Intravenous Q24H    clindamycin (CLEOCIN) IV  600 mg Intravenous Q8H    metoprolol succinate  25 mg Oral Daily    levofloxacin  250 mg Intravenous Q24H    furosemide  40 mg Intravenous Q12H    ipratropium-albuterol  1 ampule Inhalation TID    calcitRIOL  0.25 mcg Oral Daily    amiodarone  200 mg Oral BID    aspirin  81 mg Oral Daily    atorvastatin  20 mg Oral Nightly    mometasone-formoterol  2 puff Inhalation BID    carbidopa-levodopa  1 tablet Oral 4x Daily    conjugated estrogens  0.5 g Vaginal Every Other Day    ferrous sulfate  325 mg Oral BID WC    rasagiline mesylate  1 tablet Oral Daily    rOPINIRole  2 mg Oral TID    senna  2 tablet Oral Daily    vitamin D  5,000 Units Oral Daily    sodium chloride flush  10 mL Intravenous 2 times per day    insulin lispro  0-12 Units Subcutaneous TID WC    insulin lispro  0-6 Units Subcutaneous Nightly    linagliptin  5 mg Oral Daily    sodium chloride flush  10 mL Intravenous 2 times per day    miconazole   Topical BID     Continuous Infusions:   diltiazem (CARDIZEM) 125 mg in dextrose 5% 125 mL infusion Stopped (08/26/18 1029)    dextrose           Assessment/ Plan :   Paroxysmal atrial fibrillation  Chronic obstructive pulmonary disease. Hypertension  Esophagitis  CKD -  III  Hypomagnesemia - resolved    Continue current management. Will follow. Thank you very much for allowing us to participate in the care of this patient. Please call us with any questions.     Electronically signed by Jaxson Edmonds MD on 8/29/2018 at 9:51 PM

## 2018-08-30 NOTE — PLAN OF CARE
[address] [place] [address]    Second Visit Note  For more detailed information please refer to the progress note of the day      8/30/2018    6:50 PM    Name:   Edwin Hampton  MRN:     [id number]     Acct:      [de-identified]   Room:   2032/2032-01  IP Day:  10  Admit Date:  8/20/2018  1:21 AM    PCP:   Lio Puri DO  Code Status:  Full Code        Pt vitals were reviewed   New labs were reviewed   Patient was seen    Updated plan :     1. D/w   2.  Add low dose cardizem-on diet now as passed swallow study        Jose Perez,   8/30/2018  6:50 PM

## 2018-08-30 NOTE — FLOWSHEET NOTE
Dr. Fatima  arrived to the patient's bedside for evaluation and was updated on the patient's current status via RN. New order received for lopressor 5mg I.v. Q 6 hours. Patient remains in NSR at this time. Will continue to monitor closely.

## 2018-08-30 NOTE — FLOWSHEET NOTE
Dr. Yaima Guerra arrived to the patient's bedside for evaluation and was updated on the patient's current status via RN. CXR from this morning reviewed. New orders received for CXR, CBC & BMP tomorrow morning, as well as prednisone 20 mg p.o. Twice daily, and I.v. Lasix decreased to 40 mg I.v. daily. Solu-medrol discontinued. Will continue to monitor closely.

## 2018-08-30 NOTE — FLOWSHEET NOTE
Dr. Ela Chairez paged regarding patient's rhythm change noted on EKG. EKG states atrial fibrillation with rapid ventricular response, nonspecific ST & T wave abnormality. Patient is reporting fluttering in her chest. RN remains at the patient's bedside providing therapeutic presence. [id number]: Dr. Ela Chairez returned call and was updated on the patient's current status. Patient's HR remains 150's- 170. New order received for 150 mg iv amiodarone bolus & order for EKG. Will continue to monitor closely.

## 2018-08-30 NOTE — FLOWSHEET NOTE
Dr. Cristhian Wallace arrived to the patient's bedside for evaluation and was updated on the patient's current status via RN. RN notified Dr. Cristhian Wallace of patient's urine output and lab results. New order received for dextrose  5% solution I.v. Continuous (see orders for details). 0.9% NS continuous I.v. Fluids discontinued. Dr. Cristhian Wallace has requested to be notified via telephone if patient's urine output does not increase. Will continue to monitor closely.

## 2018-08-31 ENCOUNTER — APPOINTMENT (OUTPATIENT)
Dept: GENERAL RADIOLOGY | Age: 73
DRG: 871 | End: 2018-08-31
Payer: COMMERCIAL

## 2018-08-31 PROBLEM — B37.81 CANDIDA ESOPHAGITIS (HCC): Status: ACTIVE | Noted: 2018-08-31

## 2018-08-31 LAB
ABSOLUTE EOS #: 0 K/UL (ref 0–0.4)
ABSOLUTE IMMATURE GRANULOCYTE: ABNORMAL K/UL (ref 0–0.3)
ABSOLUTE LYMPH #: 0.5 K/UL (ref 1–4.8)
ABSOLUTE MONO #: 0.5 K/UL (ref 0.2–0.8)
ANION GAP SERPL CALCULATED.3IONS-SCNC: 14 MMOL/L (ref 9–17)
BASOPHILS # BLD: 0 % (ref 0–2)
BASOPHILS ABSOLUTE: 0 K/UL (ref 0–0.2)
BUN BLDV-MCNC: 43 MG/DL (ref 8–23)
BUN/CREAT BLD: 22 (ref 9–20)
CALCIUM SERPL-MCNC: 7.8 MG/DL (ref 8.6–10.4)
CHLORIDE BLD-SCNC: 95 MMOL/L (ref 98–107)
CO2: 34 MMOL/L (ref 20–31)
CREAT SERPL-MCNC: 1.95 MG/DL (ref 0.5–0.9)
DIFFERENTIAL TYPE: ABNORMAL
EKG ATRIAL RATE: 156 BPM
EKG Q-T INTERVAL: 320 MS
EKG QRS DURATION: 100 MS
EKG QTC CALCULATION (BAZETT): 476 MS
EKG R AXIS: -11 DEGREES
EKG T AXIS: -72 DEGREES
EKG VENTRICULAR RATE: 133 BPM
EOSINOPHILS RELATIVE PERCENT: 0 % (ref 1–4)
GFR AFRICAN AMERICAN: 31 ML/MIN
GFR NON-AFRICAN AMERICAN: 25 ML/MIN
GFR SERPL CREATININE-BSD FRML MDRD: ABNORMAL ML/MIN/{1.73_M2}
GFR SERPL CREATININE-BSD FRML MDRD: ABNORMAL ML/MIN/{1.73_M2}
GLUCOSE BLD-MCNC: 198 MG/DL (ref 70–99)
GLUCOSE BLD-MCNC: 201 MG/DL (ref 65–105)
GLUCOSE BLD-MCNC: 217 MG/DL (ref 65–105)
GLUCOSE BLD-MCNC: 248 MG/DL (ref 65–105)
GLUCOSE BLD-MCNC: 367 MG/DL (ref 65–105)
HCT VFR BLD CALC: 24.3 % (ref 36–46)
HEMOGLOBIN: 7.7 G/DL (ref 12–16)
IMMATURE GRANULOCYTES: ABNORMAL %
LYMPHOCYTES # BLD: 4 % (ref 24–44)
MAGNESIUM: 1.6 MG/DL (ref 1.6–2.6)
MCH RBC QN AUTO: 33 PG (ref 26–34)
MCHC RBC AUTO-ENTMCNC: 31.6 G/DL (ref 31–37)
MCV RBC AUTO: 104.5 FL (ref 80–100)
MONOCYTES # BLD: 5 % (ref 1–7)
NRBC AUTOMATED: ABNORMAL PER 100 WBC
PDW BLD-RTO: 15.4 % (ref 11.5–14.5)
PLATELET # BLD: 253 K/UL (ref 130–400)
PLATELET ESTIMATE: ABNORMAL
PMV BLD AUTO: 7.9 FL (ref 6–12)
POTASSIUM SERPL-SCNC: 4.5 MMOL/L (ref 3.7–5.3)
RBC # BLD: 2.32 M/UL (ref 4–5.2)
RBC # BLD: ABNORMAL 10*6/UL
SEG NEUTROPHILS: 91 % (ref 36–66)
SEGMENTED NEUTROPHILS ABSOLUTE COUNT: 9.7 K/UL (ref 1.8–7.7)
SODIUM BLD-SCNC: 143 MMOL/L (ref 135–144)
SURGICAL PATHOLOGY REPORT: NORMAL
WBC # BLD: 10.7 K/UL (ref 3.5–11)
WBC # BLD: ABNORMAL 10*3/UL

## 2018-08-31 PROCEDURE — 97530 THERAPEUTIC ACTIVITIES: CPT

## 2018-08-31 PROCEDURE — 71045 X-RAY EXAM CHEST 1 VIEW: CPT

## 2018-08-31 PROCEDURE — 83735 ASSAY OF MAGNESIUM: CPT

## 2018-08-31 PROCEDURE — 6360000002 HC RX W HCPCS: Performed by: INTERNAL MEDICINE

## 2018-08-31 PROCEDURE — 2580000003 HC RX 258: Performed by: INTERNAL MEDICINE

## 2018-08-31 PROCEDURE — 82947 ASSAY GLUCOSE BLOOD QUANT: CPT

## 2018-08-31 PROCEDURE — 99232 SBSQ HOSP IP/OBS MODERATE 35: CPT | Performed by: INTERNAL MEDICINE

## 2018-08-31 PROCEDURE — 2580000003 HC RX 258: Performed by: NURSE PRACTITIONER

## 2018-08-31 PROCEDURE — 94761 N-INVAS EAR/PLS OXIMETRY MLT: CPT

## 2018-08-31 PROCEDURE — 6370000000 HC RX 637 (ALT 250 FOR IP): Performed by: INTERNAL MEDICINE

## 2018-08-31 PROCEDURE — C9113 INJ PANTOPRAZOLE SODIUM, VIA: HCPCS | Performed by: INTERNAL MEDICINE

## 2018-08-31 PROCEDURE — 2700000000 HC OXYGEN THERAPY PER DAY

## 2018-08-31 PROCEDURE — 85025 COMPLETE CBC W/AUTO DIFF WBC: CPT

## 2018-08-31 PROCEDURE — 94640 AIRWAY INHALATION TREATMENT: CPT

## 2018-08-31 PROCEDURE — 2060000000 HC ICU INTERMEDIATE R&B

## 2018-08-31 PROCEDURE — 80048 BASIC METABOLIC PNL TOTAL CA: CPT

## 2018-08-31 PROCEDURE — G8979 MOBILITY GOAL STATUS: HCPCS

## 2018-08-31 PROCEDURE — 6370000000 HC RX 637 (ALT 250 FOR IP): Performed by: NURSE PRACTITIONER

## 2018-08-31 PROCEDURE — G8978 MOBILITY CURRENT STATUS: HCPCS

## 2018-08-31 PROCEDURE — 6360000002 HC RX W HCPCS: Performed by: NURSE PRACTITIONER

## 2018-08-31 PROCEDURE — 97110 THERAPEUTIC EXERCISES: CPT

## 2018-08-31 PROCEDURE — 36415 COLL VENOUS BLD VENIPUNCTURE: CPT

## 2018-08-31 PROCEDURE — 2500000003 HC RX 250 WO HCPCS: Performed by: INTERNAL MEDICINE

## 2018-08-31 PROCEDURE — 2580000003 HC RX 258: Performed by: RADIOLOGY

## 2018-08-31 PROCEDURE — 51702 INSERT TEMP BLADDER CATH: CPT

## 2018-08-31 RX ORDER — PANTOPRAZOLE SODIUM 40 MG/1
40 TABLET, DELAYED RELEASE ORAL
Status: DISCONTINUED | OUTPATIENT
Start: 2018-08-31 | End: 2018-09-07 | Stop reason: HOSPADM

## 2018-08-31 RX ORDER — POLYETHYLENE GLYCOL 3350 17 G/17G
17 POWDER, FOR SOLUTION ORAL DAILY
Status: DISCONTINUED | OUTPATIENT
Start: 2018-08-31 | End: 2018-09-07 | Stop reason: HOSPADM

## 2018-08-31 RX ORDER — FUROSEMIDE 10 MG/ML
80 INJECTION INTRAMUSCULAR; INTRAVENOUS EVERY 12 HOURS
Status: DISCONTINUED | OUTPATIENT
Start: 2018-08-31 | End: 2018-09-01

## 2018-08-31 RX ADMIN — CHOLECALCIFEROL CAP 125 MCG (5000 UNIT) 5000 UNITS: 125 CAP at 08:55

## 2018-08-31 RX ADMIN — LEVOFLOXACIN 250 MG: 5 INJECTION, SOLUTION INTRAVENOUS at 18:01

## 2018-08-31 RX ADMIN — CONJUGATED ESTROGENS 0.5 G: 0.62 CREAM VAGINAL at 08:59

## 2018-08-31 RX ADMIN — ACETAMINOPHEN 650 MG: 325 TABLET ORAL at 12:18

## 2018-08-31 RX ADMIN — FUROSEMIDE 40 MG: 10 INJECTION, SOLUTION INTRAMUSCULAR; INTRAVENOUS at 08:57

## 2018-08-31 RX ADMIN — IPRATROPIUM BROMIDE AND ALBUTEROL SULFATE 1 AMPULE: .5; 3 SOLUTION RESPIRATORY (INHALATION) at 09:11

## 2018-08-31 RX ADMIN — MORPHINE SULFATE 1 MG: 2 INJECTION, SOLUTION INTRAMUSCULAR; INTRAVENOUS at 02:03

## 2018-08-31 RX ADMIN — INSULIN LISPRO 10 UNITS: 100 INJECTION, SOLUTION INTRAVENOUS; SUBCUTANEOUS at 16:56

## 2018-08-31 RX ADMIN — PANTOPRAZOLE SODIUM 40 MG: 40 TABLET, DELAYED RELEASE ORAL at 16:54

## 2018-08-31 RX ADMIN — INSULIN LISPRO 4 UNITS: 100 INJECTION, SOLUTION INTRAVENOUS; SUBCUTANEOUS at 08:55

## 2018-08-31 RX ADMIN — CLINDAMYCIN PHOSPHATE 600 MG: 600 INJECTION, SOLUTION INTRAVENOUS at 00:38

## 2018-08-31 RX ADMIN — CARBIDOPA AND LEVODOPA 1 TABLET: 25; 100 TABLET, EXTENDED RELEASE ORAL at 12:16

## 2018-08-31 RX ADMIN — PREDNISONE 20 MG: 20 TABLET ORAL at 21:40

## 2018-08-31 RX ADMIN — METOPROLOL SUCCINATE 25 MG: 25 TABLET, FILM COATED, EXTENDED RELEASE ORAL at 08:54

## 2018-08-31 RX ADMIN — MICONAZOLE NITRATE: 20.6 POWDER TOPICAL at 21:36

## 2018-08-31 RX ADMIN — IPRATROPIUM BROMIDE AND ALBUTEROL SULFATE 1 AMPULE: .5; 3 SOLUTION RESPIRATORY (INHALATION) at 20:01

## 2018-08-31 RX ADMIN — IPRATROPIUM BROMIDE AND ALBUTEROL SULFATE 1 AMPULE: .5; 3 SOLUTION RESPIRATORY (INHALATION) at 14:09

## 2018-08-31 RX ADMIN — CALCITRIOL 0.25 MCG: 0.25 CAPSULE ORAL at 08:54

## 2018-08-31 RX ADMIN — FERROUS SULFATE TAB EC 325 MG (65 MG FE EQUIVALENT) 325 MG: 325 (65 FE) TABLET DELAYED RESPONSE at 08:56

## 2018-08-31 RX ADMIN — FUROSEMIDE 80 MG: 10 INJECTION, SOLUTION INTRAMUSCULAR; INTRAVENOUS at 22:07

## 2018-08-31 RX ADMIN — DILTIAZEM HYDROCHLORIDE 30 MG: 60 TABLET, FILM COATED ORAL at 23:43

## 2018-08-31 RX ADMIN — CARBIDOPA AND LEVODOPA 1 TABLET: 25; 100 TABLET, EXTENDED RELEASE ORAL at 16:53

## 2018-08-31 RX ADMIN — PREDNISONE 20 MG: 20 TABLET ORAL at 08:56

## 2018-08-31 RX ADMIN — Medication 10 ML: at 21:38

## 2018-08-31 RX ADMIN — ROPINIROLE HYDROCHLORIDE 2 MG: 2 TABLET, FILM COATED ORAL at 08:54

## 2018-08-31 RX ADMIN — RASAGILINE 1 MG: 1 TABLET ORAL at 12:27

## 2018-08-31 RX ADMIN — LORAZEPAM 1 MG: 2 INJECTION, SOLUTION INTRAMUSCULAR; INTRAVENOUS at 09:09

## 2018-08-31 RX ADMIN — MICONAZOLE NITRATE: 20.6 POWDER TOPICAL at 08:59

## 2018-08-31 RX ADMIN — POLYETHYLENE GLYCOL (3350) 17 G: 17 POWDER, FOR SOLUTION ORAL at 14:10

## 2018-08-31 RX ADMIN — SENNOSIDES 17.2 MG: 8.6 TABLET, FILM COATED ORAL at 08:55

## 2018-08-31 RX ADMIN — INSULIN LISPRO 2 UNITS: 100 INJECTION, SOLUTION INTRAVENOUS; SUBCUTANEOUS at 22:12

## 2018-08-31 RX ADMIN — CARBIDOPA AND LEVODOPA 1 TABLET: 25; 100 TABLET, EXTENDED RELEASE ORAL at 21:40

## 2018-08-31 RX ADMIN — ROPINIROLE HYDROCHLORIDE 2 MG: 2 TABLET, FILM COATED ORAL at 12:16

## 2018-08-31 RX ADMIN — CLINDAMYCIN PHOSPHATE 600 MG: 600 INJECTION, SOLUTION INTRAVENOUS at 23:45

## 2018-08-31 RX ADMIN — Medication 10 ML: at 08:58

## 2018-08-31 RX ADMIN — INSULIN LISPRO 4 UNITS: 100 INJECTION, SOLUTION INTRAVENOUS; SUBCUTANEOUS at 12:14

## 2018-08-31 RX ADMIN — FERROUS SULFATE TAB EC 325 MG (65 MG FE EQUIVALENT) 325 MG: 325 (65 FE) TABLET DELAYED RESPONSE at 16:54

## 2018-08-31 RX ADMIN — ACETAMINOPHEN 650 MG: 325 TABLET ORAL at 16:52

## 2018-08-31 RX ADMIN — FUROSEMIDE 40 MG: 10 INJECTION, SOLUTION INTRAMUSCULAR; INTRAVENOUS at 12:18

## 2018-08-31 RX ADMIN — AMIODARONE HYDROCHLORIDE 200 MG: 200 TABLET ORAL at 08:56

## 2018-08-31 RX ADMIN — ASPIRIN 81 MG: 81 TABLET, COATED ORAL at 08:56

## 2018-08-31 RX ADMIN — METOPROLOL TARTRATE 5 MG: 5 INJECTION, SOLUTION INTRAVENOUS at 05:37

## 2018-08-31 RX ADMIN — FLUCONAZOLE 200 MG: 2 INJECTION, SOLUTION INTRAVENOUS at 19:36

## 2018-08-31 RX ADMIN — DILTIAZEM HYDROCHLORIDE 30 MG: 60 TABLET, FILM COATED ORAL at 16:53

## 2018-08-31 RX ADMIN — PANTOPRAZOLE SODIUM 40 MG: 40 INJECTION, POWDER, FOR SOLUTION INTRAVENOUS at 08:57

## 2018-08-31 RX ADMIN — CLINDAMYCIN PHOSPHATE 600 MG: 600 INJECTION, SOLUTION INTRAVENOUS at 16:51

## 2018-08-31 RX ADMIN — ROPINIROLE HYDROCHLORIDE 2 MG: 2 TABLET, FILM COATED ORAL at 21:40

## 2018-08-31 RX ADMIN — ATORVASTATIN CALCIUM 20 MG: 20 TABLET, FILM COATED ORAL at 21:40

## 2018-08-31 RX ADMIN — LORAZEPAM 1 MG: 2 INJECTION, SOLUTION INTRAMUSCULAR; INTRAVENOUS at 22:09

## 2018-08-31 RX ADMIN — CLINDAMYCIN PHOSPHATE 600 MG: 600 INJECTION, SOLUTION INTRAVENOUS at 08:57

## 2018-08-31 RX ADMIN — DILTIAZEM HYDROCHLORIDE 30 MG: 60 TABLET, FILM COATED ORAL at 05:38

## 2018-08-31 RX ADMIN — DILTIAZEM HYDROCHLORIDE 30 MG: 60 TABLET, FILM COATED ORAL at 12:15

## 2018-08-31 RX ADMIN — LINAGLIPTIN 5 MG: 5 TABLET, FILM COATED ORAL at 08:55

## 2018-08-31 RX ADMIN — LEVALBUTEROL 1.25 MG: 1.25 SOLUTION, CONCENTRATE RESPIRATORY (INHALATION) at 04:49

## 2018-08-31 RX ADMIN — CARBIDOPA AND LEVODOPA 1 TABLET: 25; 100 TABLET, EXTENDED RELEASE ORAL at 08:56

## 2018-08-31 RX ADMIN — AMIODARONE HYDROCHLORIDE 200 MG: 200 TABLET ORAL at 21:40

## 2018-08-31 ASSESSMENT — PAIN DESCRIPTION - LOCATION
LOCATION: GENERALIZED
LOCATION: HEAD
LOCATION: HEAD
LOCATION: GENERALIZED

## 2018-08-31 ASSESSMENT — PAIN SCALES - GENERAL
PAINLEVEL_OUTOF10: 3
PAINLEVEL_OUTOF10: 3
PAINLEVEL_OUTOF10: 0
PAINLEVEL_OUTOF10: 3
PAINLEVEL_OUTOF10: 0
PAINLEVEL_OUTOF10: 0
PAINLEVEL_OUTOF10: 9

## 2018-08-31 ASSESSMENT — PAIN DESCRIPTION - PAIN TYPE
TYPE: CHRONIC PAIN
TYPE: CHRONIC PAIN
TYPE: ACUTE PAIN
TYPE: ACUTE PAIN

## 2018-08-31 ASSESSMENT — PAIN DESCRIPTION - DESCRIPTORS
DESCRIPTORS: ACHING
DESCRIPTORS: ACHING

## 2018-08-31 NOTE — PLAN OF CARE
[facility]    Second Visit Note  For more detailed information please refer to the progress note of the day      8/31/2018    6:41 PM    Name:   Cinda Tillman  MRN:     [id number]     Acct:      [de-identified]   Room:   2032/2032-01  IP Day:  11  Admit Date:  8/20/2018  1:21 AM    PCP:   Arlin Schafer DO  Code Status:  Full Code        Pt vitals were reviewed   New labs were reviewed   Patient was seen    Updated plan :     1. Cont diflucan for candida in esophagus  2.  D/w  update on insurance issues-no peer to peer until next week; I also notified my partner, who would be doing the peer to peer        Davina Perez, DO  8/31/2018  6:41 PM

## 2018-08-31 NOTE — PROGRESS NOTES
Home Exercise Program  Safety Devices  Type of devices: Call light within reach, Left in chair, Nurse notified, Gait belt  Restraints  Initially in place: No     Therapy Time   Individual Concurrent Group Co-treatment   [facility]         Time Out 0853         Minutes 40          9:35-9:45 am       Karsten Phillip, PT

## 2018-08-31 NOTE — PROGRESS NOTES
Nephrology Progress Note      SUBJECTIVE    Patient was seen and examined. She is on amiodarone for atrial fibrillation and is currently in NSR. Her pressures are more stable. Her creatinine higher at 1.95 mg/dl. Hemoglobin is 7.7 g/dl today. She has multiple areas of ecchymosis over her body. CT scan of the abdomen was negative for hydronephrosis  Initial serology which we have thus far is coming back negative as well, except for low C3 of doubtful clinical significance. .  Urine studies are indicating tubular damage, especially microscopic hematuria and pyuria  Paraprotein labs are negative  The clinical picture is consistent with acute interstitial nephritis and some ATN with diuretic use  Her baseline serum creatinine is 1.2-1.3, follows with Dr. Maria Isabel Keita in the office  Urine and blood cultures negative. 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 Jacinto is in place with I and O as charted. Pulmonary edema no better on current IV diuretics.     OBJECTIVE      CURRENT TEMPERATURE:  Temp: 97.2 °F (36.2 °C)  MAXIMUM TEMPERATURE OVER 24HRS:  Temp (24hrs), Av.8 °F (36.6 °C), Min:97.2 °F (36.2 °C), Max:98.4 °F (36.9 °C)    CURRENT RESPIRATORY RATE:  Resp: 16  CURRENT PULSE:  Pulse: 70  CURRENT BLOOD PRESSURE:  BP: 123/89  24HR BLOOD PRESSURE RANGE:  Systolic (93MNS), OLR:924 , Min:95 , DRB:035   ; Diastolic (79ECF), JLN:02, Min:39, Max:89    24HR INTAKE/OUTPUT:      Intake/Output Summary (Last 24 hours) at 18 0925  Last data filed at 18 0456   Gross per 24 hour   Intake             1050 ml   Output             1200 ml   Net             -150 ml     WEIGHT :  Patient Vitals for the past 96 hrs (Last 3 readings):   Weight   18 0455 181 lb 2 oz (82.2 kg)   18 0500 180 lb 9.6 oz (81.9 kg)   18 0500 180 lb 1.6 oz (81.7 kg)     PHYSICAL EXAM      GENERAL APPEARANCE: Creatinine improved from earlier in the admission and has continue to worsen and is at 1.95 mg/dl today. Baseline creatinine previously was thought to be 1.3 mg/dl. 2.  Hypertension: Blood pressures are normal at this time with the patient currently in NSR  3.  Baseline CKD stage III with a creatinine of 1.2-1.3 range Secondary to diabetic nephrosclerosis, follows up with Dr Jazmine Diggs. 4.  Hypernatremia. 5.  Hyperkalemia secondary to HPI, recent Bactrim use. Potassium is now normal with improvement in renal function and diuretic use  6. Significant pulmonary edema with no significant improvement on chest x-ray despite current IV Lasix dose  7. Anemia of chronic disease and blood loss, has been getting Aranesp as an outpatient  8. Hypomagnesemia  9. Proteinuria with a previous UPC of 0.5-0.7  10. Constipation with patient saying no  Bowel movement since admission    PLAN    1. Aranesp 100 mcg every 2 weeks with next scheduled dose on 9/11/18  2. Monitor the BMP daily, and CBC and magnesium and phosphorus  3. Close hemodynamic monitoring  4. Increase Lasix to 80 mg IV push every 12 hours as no significant improvement in the patient's chest x-ray noted  5. Follow-up labs ordered. We will continue to closely follow with you. 6.  BP reasonable, HR controlled at this time, cardiology following  7. Miralax daily for constipation      Please do not hesitate to call with questions.     Electronically signed by Riri Collins MD on 8/31/2018 at 9:25 AM

## 2018-08-31 NOTE — PROGRESS NOTES
04/03/2004 (Within Years)   SpO2 92%   BMI 34.22 kg/m²      Physical Exam:   General appearance: Alert & oriented, NAD  Lungs: CTA bilaterally    Heart: S1S2 RRR  Abdomen: Soft, Nontender, Not distended, BS WNL  Skin: No jaundice, No clubbing, No cyanosis    Lab and Imaging Review     CBC  Recent Labs      08/29/18   0624  08/30/18   0614  08/31/18   0501   WBC  12.8*  13.4*  10.7   HGB  8.1*  7.8*  7.7*   HCT  24.6*  24.2*  24.3*   MCV  103.4*  103.7*  104.5*   PLT  226  242  253       BMP  Recent Labs      08/29/18   0624  08/30/18   0614  08/31/18   0501   NA  142  145*  143   K  4.5  4.2  4.5   CL  98  99  95*   CO2  30  34*  34*   BUN  33*  38*  43*   CREATININE  1.69*  1.89*  1.95*   GLUCOSE  222*  177*  198*   CALCIUM  8.8  8.4*  7.8*       LFTS  No results for input(s): ALKPHOS, ALT, AST, PROT, BILITOT, BILIDIR, LABALBU in the last 72 hours. AMYLASE/LIPASE/AMMONIA  No results for input(s): AMYLASE, LIPASE, AMMONIA in the last 72 hours. PT/INR  No results for input(s): PROTIME, INR in the last 72 hours. ANEMIA STUDIES  No results for input(s): LABIRON, TIBC, FERRITIN, [id number], FOLATE, OCCULTBLD in the last 72 hours.       DATE OF PROCEDURE: 8/29/2018      SURGEON: Wes Galvez MD  Facility: [facility]  ASSISTANT: None  Anesthesia: Mac   PREOPERATIVE DIAGNOSIS:   dysphagia with aspiration     Diagnosis:  The esophagus is severely dilated, and was impacted with food up to jail, I had to [address] it piece by piece until we have the entire esophagus cleaned by pushing the food down to the stomach.   The esophagus is severely inflamed with large  ulcer at the GE junction, with white thick discharge involving the entire esophagus consistent with Candida gentle biopsies were taken,  Small hiatal hernia    gastritis        POSTOPERATIVE DIAGNOSIS: As described below     OPERATION: Upper GI endoscopy with Biopsy        SPECIMENS:  Was Obtained:   As above         Findings:     Retropharyngeal

## 2018-08-31 NOTE — PROGRESS NOTES
94%  RR16  Breath Sounds: DIMINISHED      · Bronchodilator assessment at level  2  · Hyperinflation assessment at level   · Secretion Management assessment at level    ·   · []    Bronchodilator Assessment  BRONCHODILATOR ASSESSMENT SCORE  Score 0 1 2 3 4 5   Breath Sounds   []  Patient Baseline []  No Wheeze good aeration [x]  Faint, scattered wheezing, good aeration []  Expiratory Wheezing and or moderately diminished []  Insp/Exp wheeze and/or very diminished []  Insp/Exp and/ or marked distress   Respiratory Rate   []  Patient Baseline []  Less than 20 [x]  Less than 20 []  20-25 []  Greater than 25 []  Greater than 25   Peak flow % of Pred or PB [x]  NA   []  Greater than 90%  []  81-90% []  71-80% []  Less than or equal to 70%  or unable to perform []  Unable due to Respiratory Distress   Dyspnea re []  Patient Baseline []  No SOB []  No SOB [x]  SOB on exertion []  SOB min activity []  At rest/acute   e FEV% Predicted       []  NA []  Above 69%  []  Unable []  Above 60-69%  [x]  Unable []  Above 50-59%  []  Unable []  Above 35-49%  []  Unable []  Less than 35%  []  Unable                 []  Hyperinflation Assessment  Score 1 2 3   CXR and Breath Sounds   []  Clear []  No atelectasis  Basilar aeration []  Atelectasis or absent basilar breath sounds   Incentive Spirometry Volume  (Per IBW)   []  Greater than or equal to 15ml/Kg []  less than 15ml/Kg []  less than 15ml/Kg   Surgery within last 2 weeks []  None or general   []  Abdominal or thoracic surgery  []  Abdominal or thoracic   Chronic Pulmonary Historyre []  No []  Yes []  Yes     []  Secretion Management Assessment  Score 1 2 3   Bilateral Breath Sounds   []  Occasional Rhonchi []  Scattered Rhonchi []  Course Rhonchi and/or poor aeration   Sputum    []  Small amount of thin secretions []  Moderate amount of viscous secretions []  Copius, Viscious Yellow/ Secretions   CXR as reported by physician []  clear  []  Unavailable []  Infiltrates and/or consolidation  []  Unavailable []  Mucus Plugging and or lobar consolidation  []  Unavailable   Cough []  Strong, productive cough []  Weak productive cough []  No cough or weak non-productive cough

## 2018-08-31 NOTE — PROGRESS NOTES
Cardiovascular progress Note          Patient name: Tina Yuen    YOB: 1945  Date of admission:  8/20/2018       Patient seen, examined. Previous clinical entries reviewed. All available laboratory, imaging and ancillary data reviewed. Subjective:      Her rhythm has remained stable. Changed to oral medications. She denies any,orthopnea or PND. Systems review:  Constitutional: No fever/chills. HENT: No headache, neck pain or neck stiffness. No sore throat or dysphagia. Gastrointestinal: No abdominal pain, nausea or vomiting. Cardiac: As Above  Respiratory: As above  Neurologic: No new focal weakness or numbness  Psychiatric: Normal mood and mentation       Examination:   Vitals: /88   Pulse 78   Temp 98.6 °F (37 °C) (Temporal)   Resp 20   Ht 5' 1\" (1.549 m)   Wt 181 lb 2 oz (82.2 kg)   LMP 04/03/2004 (Within Years)   SpO2 92%   BMI 34.22 kg/m²     Intake/Output Summary (Last 24 hours) at 08/31/18 1721  Last data filed at 08/31/18 1700   Gross per 24 hour   Intake              560 ml   Output             1450 ml   Net             -890 ml       General appearance: Comfortable in no apparent distress. HEENT: No pallor. No icterus  Neck: Supple. Lungs:Generally decreased breath sounds. + Crackles. Heart: S1,S2  Abdomen: Soft  Extremities: + peripheral edema  Skin: No obvious rashes. Musculoskeletal: No obvious deformities. Neurologic: No focal deficits.      Labs/ Ancillary data:     CBC:   Recent Labs      08/29/18   0624  08/30/18   0614  08/31/18   0501   WBC  12.8*  13.4*  10.7   HGB  8.1*  7.8*  7.7*   PLT  226  242  253     BMP:    Recent Labs      08/29/18   0624  08/30/18   0614  08/31/18   0501   NA  142  145*  143   K  4.5  4.2  4.5   CL  98  99  95*   CO2  30  34*  34*   BUN  33*  38*  43*   CREATININE  1.69*  1.89*  1.95*   GLUCOSE  222*  177*  198*       Medications:   Scheduled Meds:   furosemide  80 mg Intravenous Q12H    polyethylene glycol  17 g

## 2018-08-31 NOTE — PROGRESS NOTES
Allergen Reactions    Dye [Barium-Containing Compounds] Other (See Comments)     Cause Afib per     Pcn [Penicillins] Itching and Swelling    Bactrim [Sulfamethoxazole-Trimethoprim] Other (See Comments)     Allergic Nephritis    Red Dye Itching and Rash     This allergy is likely incorrect:  Per patient's  she has no issue with medications that have red dye in them or red food.  He thinks this reaction was added to chart when the pt had a colonoscopy (possibly barium or iodine dye instead)       Current Meds:   Scheduled Meds:    furosemide  80 mg Intravenous Q12H    polyethylene glycol  17 g Oral Daily    metoprolol  5 mg Intravenous Q6H    predniSONE  20 mg Oral BID    diltiazem  30 mg Oral 4 times per day    pantoprazole  40 mg Intravenous BID    And    sodium chloride (PF)  10 mL Intravenous BID    fluconazole  200 mg Intravenous Q24H    clindamycin (CLEOCIN) IV  600 mg Intravenous Q8H    metoprolol succinate  25 mg Oral Daily    levofloxacin  250 mg Intravenous Q24H    ipratropium-albuterol  1 ampule Inhalation TID    calcitRIOL  0.25 mcg Oral Daily    amiodarone  200 mg Oral BID    aspirin  81 mg Oral Daily    atorvastatin  20 mg Oral Nightly    mometasone-formoterol  2 puff Inhalation BID    carbidopa-levodopa  1 tablet Oral 4x Daily    conjugated estrogens  0.5 g Vaginal Every Other Day    ferrous sulfate  325 mg Oral BID WC    rasagiline mesylate  1 tablet Oral Daily    rOPINIRole  2 mg Oral TID    senna  2 tablet Oral Daily    vitamin D  5,000 Units Oral Daily    sodium chloride flush  10 mL Intravenous 2 times per day    insulin lispro  0-12 Units Subcutaneous TID WC    insulin lispro  0-6 Units Subcutaneous Nightly    linagliptin  5 mg Oral Daily    sodium chloride flush  10 mL Intravenous 2 times per day    miconazole   Topical BID     Continuous Infusions:    diltiazem (CARDIZEM) 125 mg in dextrose 5% 125 mL infusion Stopped (08/26/18 6706)    dextrose PRN Meds: LORazepam, morphine, diatrizoate meglumine-sodium, levalbuterol, metoprolol, acetaminophen, polyethylene glycol, sodium chloride flush, glucose, dextrose, glucagon (rDNA), dextrose, ondansetron **OR** ondansetron, sodium chloride flush, promethazine    Data:     Past Medical History:   has a past medical history of Acute on chronic diastolic congestive heart failure ([facility] Anesthesia complication; Asthma; Atrial fibrillation ([facility] Cataracts, bilateral; Cellulitis; Cerebral artery occlusion with cerebral infarction ([facility] Chronic kidney disease; Constipation; COPD (chronic obstructive pulmonary disease) ([facility] Difficult intravenous access; Diverticulosis; DM2 (diabetes mellitus, type 2) ([facility] Full dentures; GERD (gastroesophageal reflux disease); Headache(784.0); [place] (hard of hearing); Hyperlipidemia; Hyperplastic polyp of intestine; Hypertension; Impaired ambulation; Kidney stone; Morbid obesity with BMI of 40.0-44.9, adult ([facility] ECTOR on CPAP; Osteoarthritis; Parkinson disease ([facility] Restless leg syndrome; Spinal stenosis in cervical region; Type II or unspecified type diabetes mellitus without mention of complication, not stated as uncontrolled; Unspecified sleep apnea; Urethral caruncle; and Wears glasses. Social History:   reports that she quit smoking about 47 years ago. Her smoking use included Cigarettes. She has a 30.00 pack-year smoking history. She has never used smokeless tobacco. She reports that she drinks about 1.2 oz of alcohol per week . She reports that she does not use drugs.      Family History:   Family History   Problem Relation Age of Onset    Heart Failure Mother     Hypertension Mother     Heart Disease Mother     High Blood Pressure Mother        Vitals:  /89   Pulse 70   Temp 97.2 °F (36.2 °C) (Temporal)   Resp 16   Ht 5' 1\" (1.549 m)   Wt 181 lb 2 oz (82.2 kg)   LMP 2004 (Within Years)   SpO2 100%   BMI 34.22 kg/m²   Temp (24hrs), Av.8 °F (36.6 °C), Min:97.2 °F (36.2 °C), Max:98.4 °F (36.9 °C)    Recent Labs      08/30/18   1146  08/30/18 1718 08/30/18 2118 08/31/18   0802   POCGLU  213*  219*  290*  201*       I/O (24Hr): Intake/Output Summary (Last 24 hours) at 08/31/18 0951  Last data filed at 08/31/18 0456   Gross per 24 hour   Intake             1050 ml   Output             1200 ml   Net             -150 ml       Labs:    Hematology:  Recent Labs      08/29/18   0624  08/30/18   0614  08/31/18   0501   WBC  12.8*  13.4*  10.7   RBC  2.38*  2.33*  2.32*   HGB  8.1*  7.8*  7.7*   HCT  24.6*  24.2*  24.3*   MCV  103.4*  103.7*  104.5*   MCH  34.1*  33.4  33.0   MCHC  33.0  32.2  31.6   RDW  14.4  15.5*  15.4*   PLT  226  242  253   MPV  NOT REPORTED  7.6  7.9     Chemistry:  Recent Labs      08/29/18   0624 08/30/18   0614  08/31/18   0501   NA  142  145*  143   K  4.5  4.2  4.5   CL  98  99  95*   CO2  30  34*  34*   GLUCOSE  222*  177*  198*   BUN  33*  38*  43*   CREATININE  1.69*  1.89*  1.95*   MG  1.7  1.9  1.6   ANIONGAP  14  12  14   LABGLOM  30*  26*  25*   GFRAA  36*  32*  31*   CALCIUM  8.8  8.4*  7.8*   PHOS   --   4.3   --      Recent Labs      08/29/18   2017  08/30/18   0815  08/30/18   1146  08/30/18 1718 08/30/18 2118 08/31/18   0802   POCGLU  155*  213*  213*  219*  290*  201*         Lab Results   Component Value Date/Time    SPECIAL LT HAND 7ML 08/20/2018 03:45 AM     Lab Results   Component Value Date/Time    CULTURE NO GROWTH 6 DAYS 08/20/2018 03:45 AM       Lab Results   Component Value Date    POCPH 7.36 06/02/2013    POCPCO2 55 06/02/2013    POCPO2 63 06/02/2013    POCHCO3 31.0 06/02/2013    NBEA NOT REPORTED 06/02/2013    PBEA 6 06/02/2013    RVB7INY 33 06/02/2013    BLEN4GKQ 90 06/02/2013    FIO2 45.0 01/15/2017       Radiology:    Cxr:  FINDINGS:   Right PICC line is stable.       Stable right lower lobe airspace disease is identified.  There remains   bilateral pleural effusions.  The heart is enlarged. may not be until Tuesday-which is unacceptable. They will try and squeeze it in today. Received call back couple hours later-they are not able to accommodate a peer to peer today, and they do not do them on weekends or holidays (labor day Monday)-which I find unacceptable given the original denial reason of \"too unstable for ltach\" doesn't even make sense. I requested to speak to a supervisor. After being on hold nearly 20 minutes, I was told that wasn't possible and I would be called next week for the peer to peer. I had to then give them my partner's name and number as I am off next week and am not going to have access to computer to go over chart with the medical director.   I never did hear exactly when peer to peer would occur, but Tuesday was implied    Rm Perez,   8/31/2018  9:51 AM

## 2018-08-31 NOTE — PROGRESS NOTES
Pulmonary Critical Care Progress Note  Galileo Anderson MD     Patient seen for the follow up of Acute on chronic respiratory failure, acute exacerbation COPD, pulmonary edema, pleural effusion, obstructive sleep apnea, Acute kidney injury superimposed on chronic kidney disease ([address]     Subjective:  She denies chest pain. Mild occasional cough, mostly dry. Shortness of breath is getting better. She is tolerating dysphagia diet. No significant overnight events. She is sitting up in bedside chair. Examination:  Vitals: BP (!) 113/48   Pulse 75   Temp 98.6 °F (37 °C) (Temporal)   Resp 16   Ht 5' 1\" (1.549 m)   Wt 181 lb 2 oz (82.2 kg)   LMP 04/03/2004 (Within Years)   SpO2 (!) 77%   BMI 34.22 kg/m²   General appearance: alert and cooperative with exam  Neck: No JVD  Lungs: diminished breath sounds bilaterally and rales bibasilar  Heart: regular rate and rhythm, S1, S2 normal, no gallop  Abdomen: Soft, non tender, + BS  Extremities: no cyanosis or clubbing. Trace edema    LABs:  CBC:   Recent Labs      08/30/18   0614  08/31/18   0501   WBC  13.4*  10.7   HGB  7.8*  7.7*   HCT  24.2*  24.3*   PLT  242  253     BMP:   Recent Labs      08/30/18   0614  08/31/18   0501   NA  145*  143   K  4.2  4.5   CO2  34*  34*   BUN  38*  43*   CREATININE  1.89*  1.95*   LABGLOM  26*  25*   GLUCOSE  177*  198*     ABG:  Lab Results   Component Value Date    BMB6HNP 33 06/02/2013    FIO2 45.0 01/15/2017       Lab Results   Component Value Date    POCPH 7.36 06/02/2013    POCPCO2 55 06/02/2013    POCPO2 63 06/02/2013    POCHCO3 31.0 06/02/2013    NBEA NOT REPORTED 06/02/2013    PBEA 6 06/02/2013    WIO3SYE 33 06/02/2013    ONKH1CSX 90 06/02/2013    FIO2 45.0 01/15/2017     Radiology:  8/31/18      Impression:  · Acute on chronic respiratory failure  · Pulmonary edema/bilateral pleural effusion/diastolic heart failure  · Acute exacerbation of COPD  · 30-pack-year smoking  · Dysphagia/aspiration,?   Aspiration pneumonitis versus pneumonia  · Obstructive sleep apnea/obesity  · Chronic kidney disease  · HDL/HTN/DM  · A. fib with RVR    Recommendations:  · Continue IV antibiotics, Levaquin/clindamycin  · Prednisone taper  · Continue dysphagia diet  · Albuterol and Ipratropium Q 4 hours and prn  · Dulera 200  · Diuresis, per nephrology  · X-ray chest in am  · Incentive spirometry every hour while awake  · Labs: CBC and BMP in am  · BiPAP, while asleep and when necessary  · 2 liters/min via nasal cannula  · Follow-up CT scan of the chest in 6-8 weeks  · DVT prophylaxis with low molecular weight heparin  · Will follow with you    Rio Lindsay MD, [facility]  Pulmonary Critical Care and Sleep Medicine,  [facility]  Cell: [phone]  Office: [phone]

## 2018-09-01 ENCOUNTER — APPOINTMENT (OUTPATIENT)
Dept: GENERAL RADIOLOGY | Age: 73
DRG: 871 | End: 2018-09-01
Payer: COMMERCIAL

## 2018-09-01 LAB
ABSOLUTE EOS #: 0 K/UL (ref 0–0.4)
ABSOLUTE IMMATURE GRANULOCYTE: ABNORMAL K/UL (ref 0–0.3)
ABSOLUTE LYMPH #: 0.4 K/UL (ref 1–4.8)
ABSOLUTE MONO #: 0.4 K/UL (ref 0.2–0.8)
ANION GAP SERPL CALCULATED.3IONS-SCNC: 11 MMOL/L (ref 9–17)
BASOPHILS # BLD: 0 %
BASOPHILS ABSOLUTE: 0 K/UL (ref 0–0.2)
BUN BLDV-MCNC: 43 MG/DL (ref 8–23)
BUN/CREAT BLD: 22 (ref 9–20)
CALCIUM SERPL-MCNC: 7.5 MG/DL (ref 8.6–10.4)
CHLORIDE BLD-SCNC: 93 MMOL/L (ref 98–107)
CO2: 35 MMOL/L (ref 20–31)
CREAT SERPL-MCNC: 1.93 MG/DL (ref 0.5–0.9)
DIFFERENTIAL TYPE: ABNORMAL
EOSINOPHILS RELATIVE PERCENT: 0 % (ref 1–4)
GFR AFRICAN AMERICAN: 31 ML/MIN
GFR NON-AFRICAN AMERICAN: 26 ML/MIN
GFR SERPL CREATININE-BSD FRML MDRD: ABNORMAL ML/MIN/{1.73_M2}
GFR SERPL CREATININE-BSD FRML MDRD: ABNORMAL ML/MIN/{1.73_M2}
GLUCOSE BLD-MCNC: 265 MG/DL (ref 70–99)
GLUCOSE BLD-MCNC: 274 MG/DL (ref 65–105)
GLUCOSE BLD-MCNC: 286 MG/DL (ref 65–105)
GLUCOSE BLD-MCNC: 308 MG/DL (ref 65–105)
HCT VFR BLD CALC: 23 % (ref 36–46)
HEMOGLOBIN: 7.3 G/DL (ref 12–16)
IMMATURE GRANULOCYTES: ABNORMAL %
LYMPHOCYTES # BLD: 4 % (ref 24–44)
MAGNESIUM: 1.4 MG/DL (ref 1.6–2.6)
MCH RBC QN AUTO: 32.8 PG (ref 26–34)
MCHC RBC AUTO-ENTMCNC: 31.7 G/DL (ref 31–37)
MCV RBC AUTO: 103.6 FL (ref 80–100)
MONOCYTES # BLD: 4 % (ref 1–7)
NRBC AUTOMATED: ABNORMAL PER 100 WBC
PDW BLD-RTO: 15.3 % (ref 11.5–14.5)
PLATELET # BLD: 240 K/UL (ref 130–400)
PLATELET ESTIMATE: ABNORMAL
PMV BLD AUTO: 7.9 FL (ref 6–12)
POTASSIUM SERPL-SCNC: 4 MMOL/L (ref 3.7–5.3)
RBC # BLD: 2.22 M/UL (ref 4–5.2)
RBC # BLD: ABNORMAL 10*6/UL
SEG NEUTROPHILS: 92 % (ref 36–66)
SEGMENTED NEUTROPHILS ABSOLUTE COUNT: 9.1 K/UL (ref 1.8–7.7)
SODIUM BLD-SCNC: 139 MMOL/L (ref 135–144)
WBC # BLD: 9.9 K/UL (ref 3.5–11)
WBC # BLD: ABNORMAL 10*3/UL

## 2018-09-01 PROCEDURE — 6370000000 HC RX 637 (ALT 250 FOR IP): Performed by: INTERNAL MEDICINE

## 2018-09-01 PROCEDURE — 71045 X-RAY EXAM CHEST 1 VIEW: CPT

## 2018-09-01 PROCEDURE — 83735 ASSAY OF MAGNESIUM: CPT

## 2018-09-01 PROCEDURE — 6360000002 HC RX W HCPCS: Performed by: NURSE PRACTITIONER

## 2018-09-01 PROCEDURE — 6370000000 HC RX 637 (ALT 250 FOR IP): Performed by: NURSE PRACTITIONER

## 2018-09-01 PROCEDURE — 80048 BASIC METABOLIC PNL TOTAL CA: CPT

## 2018-09-01 PROCEDURE — 2580000003 HC RX 258: Performed by: RADIOLOGY

## 2018-09-01 PROCEDURE — 93005 ELECTROCARDIOGRAM TRACING: CPT

## 2018-09-01 PROCEDURE — 2700000000 HC OXYGEN THERAPY PER DAY

## 2018-09-01 PROCEDURE — 2500000003 HC RX 250 WO HCPCS: Performed by: INTERNAL MEDICINE

## 2018-09-01 PROCEDURE — 2580000003 HC RX 258: Performed by: NURSE PRACTITIONER

## 2018-09-01 PROCEDURE — 36415 COLL VENOUS BLD VENIPUNCTURE: CPT

## 2018-09-01 PROCEDURE — 6360000002 HC RX W HCPCS: Performed by: INTERNAL MEDICINE

## 2018-09-01 PROCEDURE — 99232 SBSQ HOSP IP/OBS MODERATE 35: CPT | Performed by: INTERNAL MEDICINE

## 2018-09-01 PROCEDURE — 85025 COMPLETE CBC W/AUTO DIFF WBC: CPT

## 2018-09-01 PROCEDURE — 97110 THERAPEUTIC EXERCISES: CPT

## 2018-09-01 PROCEDURE — 94640 AIRWAY INHALATION TREATMENT: CPT

## 2018-09-01 PROCEDURE — 97116 GAIT TRAINING THERAPY: CPT

## 2018-09-01 PROCEDURE — 94660 CPAP INITIATION&MGMT: CPT

## 2018-09-01 PROCEDURE — 2060000000 HC ICU INTERMEDIATE R&B

## 2018-09-01 PROCEDURE — 82947 ASSAY GLUCOSE BLOOD QUANT: CPT

## 2018-09-01 PROCEDURE — 94760 N-INVAS EAR/PLS OXIMETRY 1: CPT

## 2018-09-01 RX ORDER — FLUCONAZOLE 100 MG/1
200 TABLET ORAL DAILY
Status: DISCONTINUED | OUTPATIENT
Start: 2018-09-01 | End: 2018-09-07 | Stop reason: HOSPADM

## 2018-09-01 RX ORDER — DILTIAZEM HYDROCHLORIDE 120 MG/1
120 CAPSULE, COATED, EXTENDED RELEASE ORAL DAILY
Status: DISCONTINUED | OUTPATIENT
Start: 2018-09-01 | End: 2018-09-07 | Stop reason: HOSPADM

## 2018-09-01 RX ORDER — LEVOFLOXACIN 250 MG/1
250 TABLET ORAL DAILY
Status: DISCONTINUED | OUTPATIENT
Start: 2018-09-01 | End: 2018-09-04

## 2018-09-01 RX ORDER — MAGNESIUM SULFATE 1 G/100ML
1 INJECTION INTRAVENOUS ONCE
Status: COMPLETED | OUTPATIENT
Start: 2018-09-01 | End: 2018-09-01

## 2018-09-01 RX ORDER — FUROSEMIDE 10 MG/ML
40 INJECTION INTRAMUSCULAR; INTRAVENOUS EVERY 12 HOURS
Status: DISCONTINUED | OUTPATIENT
Start: 2018-09-01 | End: 2018-09-05

## 2018-09-01 RX ORDER — LANOLIN ALCOHOL/MO/W.PET/CERES
3 CREAM (GRAM) TOPICAL NIGHTLY PRN
Status: DISCONTINUED | OUTPATIENT
Start: 2018-09-01 | End: 2018-09-07 | Stop reason: HOSPADM

## 2018-09-01 RX ADMIN — ASPIRIN 81 MG: 81 TABLET, COATED ORAL at 08:41

## 2018-09-01 RX ADMIN — ROPINIROLE HYDROCHLORIDE 2 MG: 2 TABLET, FILM COATED ORAL at 20:15

## 2018-09-01 RX ADMIN — MICONAZOLE NITRATE: 20.6 POWDER TOPICAL at 20:16

## 2018-09-01 RX ADMIN — ATORVASTATIN CALCIUM 20 MG: 20 TABLET, FILM COATED ORAL at 20:14

## 2018-09-01 RX ADMIN — Medication 2 PUFF: at 09:45

## 2018-09-01 RX ADMIN — INSULIN LISPRO 8 UNITS: 100 INJECTION, SOLUTION INTRAVENOUS; SUBCUTANEOUS at 13:03

## 2018-09-01 RX ADMIN — Medication 10 ML: at 11:24

## 2018-09-01 RX ADMIN — LINAGLIPTIN 5 MG: 5 TABLET, FILM COATED ORAL at 08:56

## 2018-09-01 RX ADMIN — ROPINIROLE HYDROCHLORIDE 2 MG: 2 TABLET, FILM COATED ORAL at 08:42

## 2018-09-01 RX ADMIN — CLINDAMYCIN PHOSPHATE 600 MG: 600 INJECTION, SOLUTION INTRAVENOUS at 08:52

## 2018-09-01 RX ADMIN — LORAZEPAM 1 MG: 2 INJECTION, SOLUTION INTRAMUSCULAR; INTRAVENOUS at 15:06

## 2018-09-01 RX ADMIN — CARBIDOPA AND LEVODOPA 1 TABLET: 25; 100 TABLET, EXTENDED RELEASE ORAL at 13:04

## 2018-09-01 RX ADMIN — CLINDAMYCIN PHOSPHATE 600 MG: 600 INJECTION, SOLUTION INTRAVENOUS at 16:16

## 2018-09-01 RX ADMIN — PREDNISONE 20 MG: 20 TABLET ORAL at 20:14

## 2018-09-01 RX ADMIN — INSULIN LISPRO 6 UNITS: 100 INJECTION, SOLUTION INTRAVENOUS; SUBCUTANEOUS at 17:13

## 2018-09-01 RX ADMIN — CHOLECALCIFEROL CAP 125 MCG (5000 UNIT) 5000 UNITS: 125 CAP at 08:42

## 2018-09-01 RX ADMIN — AMIODARONE HYDROCHLORIDE 200 MG: 200 TABLET ORAL at 20:14

## 2018-09-01 RX ADMIN — MAGNESIUM OXIDE TAB 400 MG (241.3 MG ELEMENTAL MG) 400 MG: 400 (241.3 MG) TAB at 20:14

## 2018-09-01 RX ADMIN — IPRATROPIUM BROMIDE AND ALBUTEROL SULFATE 1 AMPULE: .5; 3 SOLUTION RESPIRATORY (INHALATION) at 20:54

## 2018-09-01 RX ADMIN — IPRATROPIUM BROMIDE AND ALBUTEROL SULFATE 1 AMPULE: .5; 3 SOLUTION RESPIRATORY (INHALATION) at 09:44

## 2018-09-01 RX ADMIN — CARBIDOPA AND LEVODOPA 1 TABLET: 25; 100 TABLET, EXTENDED RELEASE ORAL at 20:14

## 2018-09-01 RX ADMIN — CARBIDOPA AND LEVODOPA 1 TABLET: 25; 100 TABLET, EXTENDED RELEASE ORAL at 17:13

## 2018-09-01 RX ADMIN — MICONAZOLE NITRATE: 20.6 POWDER TOPICAL at 11:25

## 2018-09-01 RX ADMIN — AMIODARONE HYDROCHLORIDE 200 MG: 200 TABLET ORAL at 08:41

## 2018-09-01 RX ADMIN — LEVOFLOXACIN 250 MG: 250 TABLET, FILM COATED ORAL at 11:23

## 2018-09-01 RX ADMIN — FLUCONAZOLE 200 MG: 100 TABLET ORAL at 11:23

## 2018-09-01 RX ADMIN — DILTIAZEM HYDROCHLORIDE 120 MG: 120 CAPSULE, COATED, EXTENDED RELEASE ORAL at 11:23

## 2018-09-01 RX ADMIN — FUROSEMIDE 80 MG: 10 INJECTION, SOLUTION INTRAMUSCULAR; INTRAVENOUS at 11:23

## 2018-09-01 RX ADMIN — CALCITRIOL 0.25 MCG: 0.25 CAPSULE ORAL at 08:41

## 2018-09-01 RX ADMIN — FUROSEMIDE 40 MG: 10 INJECTION, SOLUTION INTRAMUSCULAR; INTRAVENOUS at 20:17

## 2018-09-01 RX ADMIN — POLYETHYLENE GLYCOL (3350) 17 G: 17 POWDER, FOR SOLUTION ORAL at 08:42

## 2018-09-01 RX ADMIN — FERROUS SULFATE TAB EC 325 MG (65 MG FE EQUIVALENT) 325 MG: 325 (65 FE) TABLET DELAYED RESPONSE at 17:13

## 2018-09-01 RX ADMIN — MAGNESIUM SULFATE HEPTAHYDRATE 1 G: 1 INJECTION, SOLUTION INTRAVENOUS at 13:03

## 2018-09-01 RX ADMIN — PREDNISONE 20 MG: 20 TABLET ORAL at 08:41

## 2018-09-01 RX ADMIN — MAGNESIUM OXIDE TAB 400 MG (241.3 MG ELEMENTAL MG) 400 MG: 400 (241.3 MG) TAB at 13:04

## 2018-09-01 RX ADMIN — INSULIN LISPRO 6 UNITS: 100 INJECTION, SOLUTION INTRAVENOUS; SUBCUTANEOUS at 08:42

## 2018-09-01 RX ADMIN — ROPINIROLE HYDROCHLORIDE 2 MG: 2 TABLET, FILM COATED ORAL at 13:04

## 2018-09-01 RX ADMIN — Medication 10 ML: at 20:15

## 2018-09-01 RX ADMIN — SENNOSIDES 17.2 MG: 8.6 TABLET, FILM COATED ORAL at 08:41

## 2018-09-01 RX ADMIN — CARBIDOPA AND LEVODOPA 1 TABLET: 25; 100 TABLET, EXTENDED RELEASE ORAL at 09:00

## 2018-09-01 RX ADMIN — PANTOPRAZOLE SODIUM 40 MG: 40 TABLET, DELAYED RELEASE ORAL at 08:56

## 2018-09-01 RX ADMIN — FERROUS SULFATE TAB EC 325 MG (65 MG FE EQUIVALENT) 325 MG: 325 (65 FE) TABLET DELAYED RESPONSE at 08:41

## 2018-09-01 RX ADMIN — METOPROLOL SUCCINATE 25 MG: 25 TABLET, FILM COATED, EXTENDED RELEASE ORAL at 08:41

## 2018-09-01 RX ADMIN — DILTIAZEM HYDROCHLORIDE 30 MG: 60 TABLET, FILM COATED ORAL at 06:01

## 2018-09-01 RX ADMIN — PANTOPRAZOLE SODIUM 40 MG: 40 TABLET, DELAYED RELEASE ORAL at 17:13

## 2018-09-01 RX ADMIN — INSULIN LISPRO 1 UNITS: 100 INJECTION, SOLUTION INTRAVENOUS; SUBCUTANEOUS at 21:32

## 2018-09-01 ASSESSMENT — PAIN SCALES - GENERAL
PAINLEVEL_OUTOF10: 0
PAINLEVEL_OUTOF10: 0

## 2018-09-01 NOTE — PROGRESS NOTES
Cardiovascular progress Note          Patient name: Ellis Hall    YOB: 1945  Date of admission:  8/20/2018       Patient seen, examined. Previous clinical entries reviewed. All available laboratory, imaging and ancillary data reviewed. Subjective:      She has been doing better. No new significant rhythm issues overnight. She denies any,orthopnea or PND. Systems review:  Constitutional: No fever/chills. HENT: No headache, neck pain or neck stiffness. No sore throat or dysphagia. Gastrointestinal: No abdominal pain, nausea or vomiting. Cardiac: As Above  Respiratory: As above  Neurologic: No new focal weakness or numbness  Psychiatric: Normal mood and mentation       Examination:   Vitals: BP (!) 150/74   Pulse 74   Temp 98.8 °F (37.1 °C) (Oral)   Resp 18   Ht 5' 1\" (1.549 m)   Wt 181 lb 4 oz (82.2 kg)   LMP 04/03/2004 (Within Years)   SpO2 95%   BMI 34.25 kg/m²     Intake/Output Summary (Last 24 hours) at 09/01/18 1052  Last data filed at 09/01/18 0608   Gross per 24 hour   Intake           1283.3 ml   Output             2950 ml   Net          -1666.7 ml       General appearance: Comfortable in no apparent distress. HEENT: + pallor. No icterus  Neck: Supple. Lungs:Generally decreased breath sounds. + course crackles. Heart: S1,S2  Abdomen: Soft  Extremities: + peripheral edema  Skin: No obvious rashes. Musculoskeletal: No obvious deformities. Neurologic: No focal deficits.      Labs/ Ancillary data:     CBC:   Recent Labs      08/30/18   0614  08/31/18   0501  09/01/18   0530   WBC  13.4*  10.7  9.9   HGB  7.8*  7.7*  7.3*   PLT  242  253  240     BMP:    Recent Labs      08/30/18   0614  08/31/18   0501  09/01/18   0530   NA  145*  143  139   K  4.2  4.5  4.0   CL  99  95*  93*   CO2  34*  34*  35*   BUN  38*  43*  43*   CREATININE  1.89*  1.95*  1.93*   GLUCOSE  177*  198*  265*       Medications:   Scheduled Meds:   levofloxacin  250 mg Oral Daily    fluconazole

## 2018-09-01 NOTE — PROGRESS NOTES
Patient transferred to room 1003-2 per wheelchair, patient A/O X4, VSS, NAD. Patient oriented to room, call light and personal belongings within reach.

## 2018-09-01 NOTE — PROGRESS NOTES
CBC and BMP in am  · BiPAP, while asleep and when necessary  · 2 liters/min via nasal cannula  · Follow-up CT scan of the chest in 6-8 weeks  · DVT prophylaxis with low molecular weight heparin  · Will follow with you    Gibson Lara MD, [facility]  Pulmonary Critical Care and Sleep Medicine,  [facility]  Cell: [phone]  Office: [phone]

## 2018-09-01 NOTE — PROGRESS NOTES
Nephrology Progress Note      SUBJECTIVE    Patient was seen and examined. She is on amiodarone for atrial fibrillation and is currently in NSR. Her pressures are more stable. Her creatinine higher at 1.93 mg/dl. Hemoglobin is 7.3 g/dl today. She has multiple areas of ecchymosis over her body. CT scan of the abdomen was negative for hydronephrosis  Initial serology which we have thus far is coming back negative as well, except for low C3 of doubtful clinical significance. .  Urine studies are indicating tubular damage, especially microscopic hematuria and pyuria  Paraprotein labs are negative  The clinical picture is consistent with acute interstitial nephritis and some ATN with diuretic use  Her baseline serum creatinine is 1.2-1.3, follows with Dr. Concha Cody in the office  Urine and blood cultures negative. 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 Jacinto is in place with I and O as charted. Pulmonary edema better on current IV diuretics.     OBJECTIVE      CURRENT TEMPERATURE:  Temp: 98.8 °F (37.1 °C)  MAXIMUM TEMPERATURE OVER 24HRS:  Temp (24hrs), Av.5 °F (36.9 °C), Min:97.5 °F (36.4 °C), Max:98.8 °F (37.1 °C)    CURRENT RESPIRATORY RATE:  Resp: 18  CURRENT PULSE:  Pulse: 74  CURRENT BLOOD PRESSURE:  BP: (!) 150/74  24HR BLOOD PRESSURE RANGE:  Systolic (53FIJ), KATIE:432 , Min:88 , STT:446   ; Diastolic (87BLT), FELICE:18, Min:38, Max:88    24HR INTAKE/OUTPUT:      Intake/Output Summary (Last 24 hours) at 18 1134  Last data filed at 18 1001   Gross per 24 hour   Intake           1283.3 ml   Output             2950 ml   Net          -1666.7 ml     WEIGHT :  Patient Vitals for the past 96 hrs (Last 3 readings):   Weight   18 0601 181 lb 4 oz (82.2 kg)   18 0455 181 lb 2 oz (82.2 kg)   18 0500 180 lb 9.6 oz (81.9 kg)     PHYSICAL EXAM      GENERAL APPEARANCE: awake

## 2018-09-01 NOTE — PROGRESS NOTES
endurance;Decreased balance;Decreased coordination  Assessment: Pt improving tolerance for activity, possibly benefit from inpt rehab if continues to increase participation  Prognosis: Good  Patient Education: POC  REQUIRES PT FOLLOW UP: Yes  Activity Tolerance  Activity Tolerance: Patient limited by fatigue;Patient limited by endurance     AM-PAC Score  AM-PAC Inpatient Mobility Raw Score : 12  AM-PAC Inpatient T-Scale Score : 35.33  Mobility Inpatient CMS 0-100% Score: 68.66  Mobility Inpatient CMS G-Code Modifier : CL          Goals  Short term goals  Time Frame for Short term goals: 12 tx's  Short term goal 1: Bed mobiity independent   Short term goal 2: Pt transfer with CGA  Short term goal 3: Pt amb 25 ft with rw nd Patsy   Short term goal 4: Pt particpate in 30  minutes ther ex to increase strength for function  Patient Goals   Patient goals : Pt goal is to keep her strength up    Plan    Plan  Times per week: 1-2x/day. 5-6x/week.    Specific instructions for Next Treatment: Attempt EOB  Current Treatment Recommendations: Strengthening, ROM, Safety Education & Training, Positioning, Patient/Caregiver Education & Training, Home Exercise Program  Safety Devices  Type of devices:  (Left in W/C, RN in room ready to transfer pt. )  Restraints  Initially in place: No     Therapy Time   Individual Concurrent Group Co-treatment   Time In 0819         Time Out 0842         Minutes [place], [place]

## 2018-09-02 LAB
ABSOLUTE EOS #: 0 K/UL (ref 0–0.4)
ABSOLUTE IMMATURE GRANULOCYTE: ABNORMAL K/UL (ref 0–0.3)
ABSOLUTE LYMPH #: 0.4 K/UL (ref 1–4.8)
ABSOLUTE MONO #: 0.5 K/UL (ref 0.2–0.8)
ANION GAP SERPL CALCULATED.3IONS-SCNC: 14 MMOL/L (ref 9–17)
BASOPHILS # BLD: 0 % (ref 0–2)
BASOPHILS ABSOLUTE: 0 K/UL (ref 0–0.2)
BUN BLDV-MCNC: 40 MG/DL (ref 8–23)
BUN/CREAT BLD: 22 (ref 9–20)
CALCIUM SERPL-MCNC: 7.2 MG/DL (ref 8.6–10.4)
CHLORIDE BLD-SCNC: 88 MMOL/L (ref 98–107)
CO2: 37 MMOL/L (ref 20–31)
CREAT SERPL-MCNC: 1.86 MG/DL (ref 0.5–0.9)
DIFFERENTIAL TYPE: ABNORMAL
EKG ATRIAL RATE: 73 BPM
EKG P AXIS: 42 DEGREES
EKG P-R INTERVAL: 194 MS
EKG Q-T INTERVAL: 416 MS
EKG QRS DURATION: 92 MS
EKG QTC CALCULATION (BAZETT): 458 MS
EKG R AXIS: -34 DEGREES
EKG T AXIS: 17 DEGREES
EKG VENTRICULAR RATE: 73 BPM
EOSINOPHILS RELATIVE PERCENT: 0 % (ref 1–4)
GFR AFRICAN AMERICAN: 32 ML/MIN
GFR NON-AFRICAN AMERICAN: 27 ML/MIN
GFR SERPL CREATININE-BSD FRML MDRD: ABNORMAL ML/MIN/{1.73_M2}
GFR SERPL CREATININE-BSD FRML MDRD: ABNORMAL ML/MIN/{1.73_M2}
GLUCOSE BLD-MCNC: 197 MG/DL (ref 65–105)
GLUCOSE BLD-MCNC: 223 MG/DL (ref 65–105)
GLUCOSE BLD-MCNC: 234 MG/DL (ref 65–105)
GLUCOSE BLD-MCNC: 253 MG/DL (ref 70–99)
GLUCOSE BLD-MCNC: 254 MG/DL (ref 65–105)
GLUCOSE BLD-MCNC: 260 MG/DL (ref 65–105)
HCT VFR BLD CALC: 25.9 % (ref 36–46)
HEMOGLOBIN: 8.3 G/DL (ref 12–16)
IMMATURE GRANULOCYTES: ABNORMAL %
LYMPHOCYTES # BLD: 4 % (ref 24–44)
MAGNESIUM: 1.6 MG/DL (ref 1.6–2.6)
MCH RBC QN AUTO: 33.1 PG (ref 26–34)
MCHC RBC AUTO-ENTMCNC: 32 G/DL (ref 31–37)
MCV RBC AUTO: 103.5 FL (ref 80–100)
MONOCYTES # BLD: 4 % (ref 1–7)
NRBC AUTOMATED: ABNORMAL PER 100 WBC
PDW BLD-RTO: 15.8 % (ref 11.5–14.5)
PHOSPHORUS: 4.1 MG/DL (ref 2.6–4.5)
PLATELET # BLD: 295 K/UL (ref 130–400)
PLATELET ESTIMATE: ABNORMAL
PMV BLD AUTO: 7.8 FL (ref 6–12)
POTASSIUM SERPL-SCNC: 4 MMOL/L (ref 3.7–5.3)
RBC # BLD: 2.5 M/UL (ref 4–5.2)
RBC # BLD: ABNORMAL 10*6/UL
SEG NEUTROPHILS: 92 % (ref 36–66)
SEGMENTED NEUTROPHILS ABSOLUTE COUNT: 11.5 K/UL (ref 1.8–7.7)
SODIUM BLD-SCNC: 139 MMOL/L (ref 135–144)
VITAMIN D 25-HYDROXY: 52.5 NG/ML (ref 30–100)
WBC # BLD: 12.4 K/UL (ref 3.5–11)
WBC # BLD: ABNORMAL 10*3/UL

## 2018-09-02 PROCEDURE — 6370000000 HC RX 637 (ALT 250 FOR IP): Performed by: INTERNAL MEDICINE

## 2018-09-02 PROCEDURE — 85025 COMPLETE CBC W/AUTO DIFF WBC: CPT

## 2018-09-02 PROCEDURE — 6360000002 HC RX W HCPCS: Performed by: INTERNAL MEDICINE

## 2018-09-02 PROCEDURE — 2500000003 HC RX 250 WO HCPCS: Performed by: INTERNAL MEDICINE

## 2018-09-02 PROCEDURE — 97110 THERAPEUTIC EXERCISES: CPT

## 2018-09-02 PROCEDURE — 2580000003 HC RX 258: Performed by: NURSE PRACTITIONER

## 2018-09-02 PROCEDURE — 97530 THERAPEUTIC ACTIVITIES: CPT

## 2018-09-02 PROCEDURE — 6370000000 HC RX 637 (ALT 250 FOR IP): Performed by: NURSE PRACTITIONER

## 2018-09-02 PROCEDURE — 83735 ASSAY OF MAGNESIUM: CPT

## 2018-09-02 PROCEDURE — 80048 BASIC METABOLIC PNL TOTAL CA: CPT

## 2018-09-02 PROCEDURE — 94760 N-INVAS EAR/PLS OXIMETRY 1: CPT

## 2018-09-02 PROCEDURE — 36415 COLL VENOUS BLD VENIPUNCTURE: CPT

## 2018-09-02 PROCEDURE — 2580000003 HC RX 258: Performed by: RADIOLOGY

## 2018-09-02 PROCEDURE — 2700000000 HC OXYGEN THERAPY PER DAY

## 2018-09-02 PROCEDURE — 82306 VITAMIN D 25 HYDROXY: CPT

## 2018-09-02 PROCEDURE — 99232 SBSQ HOSP IP/OBS MODERATE 35: CPT | Performed by: INTERNAL MEDICINE

## 2018-09-02 PROCEDURE — 2060000000 HC ICU INTERMEDIATE R&B

## 2018-09-02 PROCEDURE — 97116 GAIT TRAINING THERAPY: CPT

## 2018-09-02 PROCEDURE — 94640 AIRWAY INHALATION TREATMENT: CPT

## 2018-09-02 PROCEDURE — 6360000002 HC RX W HCPCS: Performed by: NURSE PRACTITIONER

## 2018-09-02 PROCEDURE — 84100 ASSAY OF PHOSPHORUS: CPT

## 2018-09-02 RX ORDER — CLINDAMYCIN HYDROCHLORIDE 150 MG/1
300 CAPSULE ORAL EVERY 8 HOURS SCHEDULED
Status: DISCONTINUED | OUTPATIENT
Start: 2018-09-02 | End: 2018-09-04

## 2018-09-02 RX ADMIN — INSULIN LISPRO 6 UNITS: 100 INJECTION, SOLUTION INTRAVENOUS; SUBCUTANEOUS at 13:20

## 2018-09-02 RX ADMIN — FERROUS SULFATE TAB EC 325 MG (65 MG FE EQUIVALENT) 325 MG: 325 (65 FE) TABLET DELAYED RESPONSE at 08:50

## 2018-09-02 RX ADMIN — Medication 10 ML: at 21:28

## 2018-09-02 RX ADMIN — CLINDAMYCIN HYDROCHLORIDE 300 MG: 150 CAPSULE ORAL at 21:28

## 2018-09-02 RX ADMIN — LORAZEPAM 1 MG: 2 INJECTION, SOLUTION INTRAMUSCULAR; INTRAVENOUS at 00:22

## 2018-09-02 RX ADMIN — Medication 10 ML: at 21:29

## 2018-09-02 RX ADMIN — CARBIDOPA AND LEVODOPA 1 TABLET: 25; 100 TABLET, EXTENDED RELEASE ORAL at 08:45

## 2018-09-02 RX ADMIN — INSULIN LISPRO 5 UNITS: 100 INJECTION, SOLUTION INTRAVENOUS; SUBCUTANEOUS at 21:20

## 2018-09-02 RX ADMIN — IPRATROPIUM BROMIDE AND ALBUTEROL SULFATE 1 AMPULE: .5; 3 SOLUTION RESPIRATORY (INHALATION) at 17:46

## 2018-09-02 RX ADMIN — CARBIDOPA AND LEVODOPA 1 TABLET: 25; 100 TABLET, EXTENDED RELEASE ORAL at 21:28

## 2018-09-02 RX ADMIN — CHOLECALCIFEROL CAP 125 MCG (5000 UNIT) 5000 UNITS: 125 CAP at 08:50

## 2018-09-02 RX ADMIN — IPRATROPIUM BROMIDE AND ALBUTEROL SULFATE 1 AMPULE: .5; 3 SOLUTION RESPIRATORY (INHALATION) at 07:40

## 2018-09-02 RX ADMIN — MAGNESIUM OXIDE TAB 400 MG (241.3 MG ELEMENTAL MG) 400 MG: 400 (241.3 MG) TAB at 13:20

## 2018-09-02 RX ADMIN — FUROSEMIDE 40 MG: 10 INJECTION, SOLUTION INTRAMUSCULAR; INTRAVENOUS at 08:51

## 2018-09-02 RX ADMIN — CARBIDOPA AND LEVODOPA 1 TABLET: 25; 100 TABLET, EXTENDED RELEASE ORAL at 13:21

## 2018-09-02 RX ADMIN — FERROUS SULFATE TAB EC 325 MG (65 MG FE EQUIVALENT) 325 MG: 325 (65 FE) TABLET DELAYED RESPONSE at 16:22

## 2018-09-02 RX ADMIN — ATORVASTATIN CALCIUM 20 MG: 20 TABLET, FILM COATED ORAL at 21:28

## 2018-09-02 RX ADMIN — ASPIRIN 81 MG: 81 TABLET, COATED ORAL at 08:45

## 2018-09-02 RX ADMIN — PANTOPRAZOLE SODIUM 40 MG: 40 TABLET, DELAYED RELEASE ORAL at 16:22

## 2018-09-02 RX ADMIN — PREDNISONE 20 MG: 20 TABLET ORAL at 08:45

## 2018-09-02 RX ADMIN — MICONAZOLE NITRATE: 20.6 POWDER TOPICAL at 13:35

## 2018-09-02 RX ADMIN — AMIODARONE HYDROCHLORIDE 200 MG: 200 TABLET ORAL at 21:28

## 2018-09-02 RX ADMIN — LORAZEPAM 1 MG: 2 INJECTION, SOLUTION INTRAMUSCULAR; INTRAVENOUS at 17:39

## 2018-09-02 RX ADMIN — LORAZEPAM 1 MG: 2 INJECTION, SOLUTION INTRAMUSCULAR; INTRAVENOUS at 21:28

## 2018-09-02 RX ADMIN — CLINDAMYCIN HYDROCHLORIDE 300 MG: 150 CAPSULE ORAL at 13:20

## 2018-09-02 RX ADMIN — ROPINIROLE HYDROCHLORIDE 2 MG: 2 TABLET, FILM COATED ORAL at 13:20

## 2018-09-02 RX ADMIN — CLINDAMYCIN PHOSPHATE 600 MG: 600 INJECTION, SOLUTION INTRAVENOUS at 08:44

## 2018-09-02 RX ADMIN — ROPINIROLE HYDROCHLORIDE 2 MG: 2 TABLET, FILM COATED ORAL at 08:50

## 2018-09-02 RX ADMIN — FLUCONAZOLE 200 MG: 100 TABLET ORAL at 08:50

## 2018-09-02 RX ADMIN — INSULIN LISPRO 6 UNITS: 100 INJECTION, SOLUTION INTRAVENOUS; SUBCUTANEOUS at 08:52

## 2018-09-02 RX ADMIN — LINAGLIPTIN 5 MG: 5 TABLET, FILM COATED ORAL at 08:45

## 2018-09-02 RX ADMIN — ACETAMINOPHEN 650 MG: 325 TABLET ORAL at 21:28

## 2018-09-02 RX ADMIN — RASAGILINE 1 MG: 1 TABLET ORAL at 08:45

## 2018-09-02 RX ADMIN — PANTOPRAZOLE SODIUM 40 MG: 40 TABLET, DELAYED RELEASE ORAL at 06:04

## 2018-09-02 RX ADMIN — CARBIDOPA AND LEVODOPA 1 TABLET: 25; 100 TABLET, EXTENDED RELEASE ORAL at 16:22

## 2018-09-02 RX ADMIN — CALCITRIOL 0.25 MCG: 0.25 CAPSULE ORAL at 08:50

## 2018-09-02 RX ADMIN — ROPINIROLE HYDROCHLORIDE 2 MG: 2 TABLET, FILM COATED ORAL at 21:28

## 2018-09-02 RX ADMIN — LEVOFLOXACIN 250 MG: 250 TABLET, FILM COATED ORAL at 08:51

## 2018-09-02 RX ADMIN — INSULIN LISPRO 6 UNITS: 100 INJECTION, SOLUTION INTRAVENOUS; SUBCUTANEOUS at 17:30

## 2018-09-02 RX ADMIN — MAGNESIUM OXIDE TAB 400 MG (241.3 MG ELEMENTAL MG) 400 MG: 400 (241.3 MG) TAB at 21:28

## 2018-09-02 RX ADMIN — DILTIAZEM HYDROCHLORIDE 120 MG: 120 CAPSULE, COATED, EXTENDED RELEASE ORAL at 08:45

## 2018-09-02 RX ADMIN — AMIODARONE HYDROCHLORIDE 200 MG: 200 TABLET ORAL at 08:45

## 2018-09-02 RX ADMIN — METOPROLOL SUCCINATE 25 MG: 25 TABLET, FILM COATED, EXTENDED RELEASE ORAL at 08:45

## 2018-09-02 RX ADMIN — CLINDAMYCIN PHOSPHATE 600 MG: 600 INJECTION, SOLUTION INTRAVENOUS at 01:04

## 2018-09-02 ASSESSMENT — PAIN SCALES - GENERAL
PAINLEVEL_OUTOF10: 1
PAINLEVEL_OUTOF10: 0
PAINLEVEL_OUTOF10: 0
PAINLEVEL_OUTOF10: 3
PAINLEVEL_OUTOF10: 0

## 2018-09-02 NOTE — PROGRESS NOTES
[facility]    Progress Note    9/2/2018    9:12 AM    Name:   Josephine Vazquez  MRN:     [id number]     Acct:      [de-identified]   Room:   [facility] Day:  15  Admit Date:  8/20/2018  1:21 AM    PCP:   Lazara Han DO  Code Status:  Full Code    Subjective:     C/C:   Chief Complaint   Patient presents with    Emesis    Nausea     Interval History Status: improved. no sob  Denies cp/n/v  No rapid afib episodes for 72 hours now    Brief History:     Per my partner:   \"This is 22-year-old white female who is noted with a complaint of nausea, vomiting and diarrhea and found have acute kidney injury and hypotension.  She is admitted into the intensive care unit and placed on IV fluids and Levophed drip.  She has recently been on Bactrim as an outpatient for urinary tract infection.     On the evening of August 22, 2018 she developed shortness of breath and respiratory distress requiring BiPAP placement.  Chest x-ray August 23 showing pulmonary edema.  IV fluids were discontinued     On the evening of August 23 round 6:30 PM she developed rapid atrial fibrillation and was placed on Cardizem drip as well as heparin drip. cardiac enzymes are negative.  Throughout the night she converted to sinus rhythm and Cardizem drip was weaned off.  She's had recurrent episodes of atrial fibrillation with rapid response and cardiology has increased her amiodarone to 200 mg twice a day\"    Had an episode of n/v and aspiration on 8/28-developed aspiration pneumonia rll    Had egd 8/29 which showed retained food in esophagus, GE junction ulcer and candida esophagitis        Review of Systems:     Constitutional:  negative for chills, fevers, sweats  Respiratory:  negative for cough, dyspnea on exertion, shortness of breath, wheezing  Cardiovascular:  negative for chest pain, chest pressure/discomfort, palpitations  Gastrointestinal:  negative for abdominal pain, constipation, diarrhea, nausea, vomiting  Neurological:  negative for dizziness, headache    Medications: Allergies: Allergies   Allergen Reactions    Dye [Barium-Containing Compounds] Other (See Comments)     Cause Afib per     Pcn [Penicillins] Itching and Swelling    Bactrim [Sulfamethoxazole-Trimethoprim] Other (See Comments)     Allergic Nephritis    Red Dye Itching and Rash     This allergy is likely incorrect:  Per patient's  she has no issue with medications that have red dye in them or red food.  He thinks this reaction was added to chart when the pt had a colonoscopy (possibly barium or iodine dye instead)       Current Meds:   Scheduled Meds:    levofloxacin  250 mg Oral Daily    fluconazole  200 mg Oral Daily    diltiazem  120 mg Oral Daily    furosemide  40 mg Intravenous Q12H    magnesium oxide  400 mg Oral BID    polyethylene glycol  17 g Oral Daily    pantoprazole  40 mg Oral BID AC    predniSONE  20 mg Oral BID    clindamycin (CLEOCIN) IV  600 mg Intravenous Q8H    metoprolol succinate  25 mg Oral Daily    ipratropium-albuterol  1 ampule Inhalation TID    calcitRIOL  0.25 mcg Oral Daily    amiodarone  200 mg Oral BID    aspirin  81 mg Oral Daily    atorvastatin  20 mg Oral Nightly    mometasone-formoterol  2 puff Inhalation BID    carbidopa-levodopa  1 tablet Oral 4x Daily    conjugated estrogens  0.5 g Vaginal Every Other Day    ferrous sulfate  325 mg Oral BID WC    rasagiline mesylate  1 tablet Oral Daily    rOPINIRole  2 mg Oral TID    senna  2 tablet Oral Daily    vitamin D  5,000 Units Oral Daily    sodium chloride flush  10 mL Intravenous 2 times per day    insulin lispro  0-12 Units Subcutaneous TID WC    insulin lispro  0-6 Units Subcutaneous Nightly    linagliptin  5 mg Oral Daily    sodium chloride flush  10 mL Intravenous 2 times per day    miconazole   Topical BID     Continuous Infusions:    diltiazem (CARDIZEM) 125 mg in dextrose 5% 125 mL infusion Stopped (08/26/18 1029)    dextrose       PRN Meds: melatonin, LORazepam, morphine, diatrizoate meglumine-sodium, levalbuterol, metoprolol, acetaminophen, polyethylene glycol, sodium chloride flush, glucose, dextrose, glucagon (rDNA), dextrose, ondansetron **OR** ondansetron, sodium chloride flush, promethazine    Data:     Past Medical History:   has a past medical history of Acute on chronic diastolic congestive heart failure ([facility] [facility] Anesthesia complication; Asthma; Atrial fibrillation ([facility] [address] [facility] Cataracts, bilateral; Cellulitis; Cerebral artery occlusion with cerebral infarction ([facility] [address] [facility] Chronic kidney disease; Constipation; COPD (chronic obstructive pulmonary disease) ([facility] [address] [facility] Difficult intravenous access; Diverticulosis; DM2 (diabetes mellitus, type 2) ([facility] [address] [facility] Full dentures; GERD (gastroesophageal reflux disease); Headache(784.0); [place] (hard of hearing); Hyperlipidemia; Hyperplastic polyp of intestine; Hypertension; Impaired ambulation; Kidney stone; Morbid obesity with BMI of 40.0-44.9, adult ([facility] [facility] ECTOR on CPAP; Osteoarthritis; Parkinson disease ([facility] [facility] Restless leg syndrome; Spinal stenosis in cervical region; Type II or unspecified type diabetes mellitus without mention of complication, not stated as uncontrolled; Unspecified sleep apnea; Urethral caruncle; and Wears glasses. Social History:   reports that she quit smoking about 47 years ago. Her smoking use included Cigarettes. She has a 30.00 pack-year smoking history. She has never used smokeless tobacco. She reports that she drinks about 1.2 oz of alcohol per week . She reports that she does not use drugs.      Family History:   Family History   Problem Relation Age of Onset    Heart Failure Mother     Hypertension Mother     Heart Disease Mother     High Blood Pressure Mother        Vitals:  BP (!) 142/63   Pulse 76   Temp 97.9 °F (36.6 °C) (Oral)   Resp 18   Ht 5' 1\" (1.549 m)   Wt 181 lb 4 oz (82.2 kg)   LMP 04/03/2004 (Within Years)   SpO2 98%   BMI 34.25 kg/m²   Temp (24hrs), Av.2 °F (36.8 °C), Min:97.9 °F (36.6 °C), Max:98.8 °F (37.1 °C)    Recent Labs      18   0754  18   1252  18   1618  18   0809   POCGLU  286*  308*  274*  254*       I/O (24Hr):     Intake/Output Summary (Last 24 hours) at 18 09  Last data filed at 18 1312   Gross per 24 hour   Intake                0 ml   Output              800 ml   Net             -800 ml       Labs:    Hematology:  Recent Labs      18   0501  18   0530  18   0646   WBC  10.7  9.9  12.4*   RBC  2.32*  2.22*  2.50*   HGB  7.7*  7.3*  8.3*   HCT  24.3*  23.0*  25.9*   MCV  104.5*  103.6*  103.5*   MCH  33.0  32.8  33.1   MCHC  31.6  31.7  32.0   RDW  15.4*  15.3*  15.8*   PLT  253  240  295   MPV  7.9  7.9  7.8     Chemistry:  Recent Labs      18   0501  18   0530  18   0646   NA  143  139  139   K  4.5  4.0  4.0   CL  95*  93*  88*   CO2  34*  35*  37*   GLUCOSE  198*  265*  253*   BUN  43*  43*  40*   CREATININE  1.95*  1.93*  1.86*   MG  1.6  1.4*  1.6   ANIONGAP  14  11  14   LABGLOM  25*  26*  27*   GFRAA  31*  31*  32*   CALCIUM  7.8*  7.5*  7.2*   PHOS   --    --   4.1     Recent Labs      18   1652  18   2136  18   0754  18   1252  18   1618  18   0809   POCGLU  367*  217*  286*  308*  274*  254*         Lab Results   Component Value Date/Time    SPECIAL LT HAND 7ML 2018 03:45 AM     Lab Results   Component Value Date/Time    CULTURE NO GROWTH 6 DAYS 2018 03:45 AM       Lab Results   Component Value Date    POCPH 7.36 2013    POCPCO2 55 2013    POCPO2 63 2013    POCHCO3 31.0 2013    NBEA NOT REPORTED 2013    PBEA 6 2013    WXS1RNZ 33 2013    NRIR3KPY 90 2013    FIO2 45.0 01/15/2017       Radiology:    Nothing new      Physical Examination:        General appearance:  alert, cooperative and no distress  Mental Status: last week)    Mavis HO Blood, DO  9/2/2018  9:12 AM

## 2018-09-02 NOTE — PROGRESS NOTES
times daily 3/10/18  Yes Pratima Kuhn MD   aspirin 81 MG tablet Take 81 mg by mouth daily LAST DOSE 03/31/2018   Yes Historical Provider, MD   rOPINIRole (REQUIP) 2 MG tablet Take 2 mg by mouth 3 times daily   Yes Historical Provider, MD   pantoprazole (PROTONIX) 40 MG tablet Take 1 tablet by mouth 2 times daily (before meals) 1/22/18  Yes Lesly Hill DO   BREO ELLIPTA 100-25 MCG/INH AEPB inhaler Inhale 1 puff into the lungs daily 1/22/18  Yes Lesly Hill DO   ferrous sulfate 325 (65 Fe) MG EC tablet Take 1 tablet by mouth 2 times daily (with meals) 12/21/17  Yes Jose HO Blood, DO   vitamin D (CHOLECALCIFEROL) 5000 units CAPS capsule Take 5,000 Units by mouth daily   Yes Historical Provider, MD   rasagiline mesylate 1 MG TABS Take 1 tablet by mouth daily   Yes Historical Provider, MD   amiodarone (CORDARONE) 200 MG tablet Take 1 tablet by mouth daily 8/27/17  Yes Katty HO Blood, DO   albuterol (PROVENTIL) (2.5 MG/3ML) 0.083% nebulizer solution Take 3 mLs by nebulization every 6 hours as needed for Wheezing 7/18/17  Yes Lesly Hill DO   Oxygen Concentrator Dx: Pneumonia, use as directed. Patient taking differently: Dx: Pneumonia, use as directed.    ON 24/7 2/10/17  Yes Lesly Hill DO   JANUMET  MG per tablet TAKE 1 TABLET TWICE A DAY WITH MEALS 8/28/18   Lesly Hill DO   calcitRIOL (ROCALTROL) 0.25 MCG capsule TAKE 1 CAPSULE THREE TIMES A DAY 8/22/18   Lesly Hill DO   ondansetron (ZOFRAN) 8 MG tablet TK 1 T PO Q 8 H PRF NAUSEA OR VOM 5/17/18   Historical Provider, MD   HYDROcodone-acetaminophen ([address]) 5-325 MG per tablet TK 1 T PO BID PRF PAIN 7/16/18   Historical Provider, MD   .  Recent Surgical History: None = 0     Assessment: unable to perform, home meds     Peak Flow (asthma only)    Predicted:   Personal Best:   PEF   % Predicted   Peak Flow : not applicable = 0    QPX5/MARK    FEV1 Predicted       FEV1     FEV1 % Predicted   FVC   IS volume   IBW  FIO2% 28  SPO2

## 2018-09-02 NOTE — PROGRESS NOTES
Nephrology Progress Note      SUBJECTIVE    Patient was seen and examined. She is on amiodarone for atrial fibrillation and is currently in NSR. Her pressures are more stable. Her creatinine better at 1.86 mg/dl. Hemoglobin is 8.3 g/dl today. She has multiple areas of ecchymosis over her body. CT scan of the abdomen was negative for hydronephrosis  Initial serology which we have thus far is coming back negative as well, except for low C3 of doubtful clinical significance. .  Urine studies are indicating tubular damage, especially microscopic hematuria and pyuria  Paraprotein labs are negative  The clinical picture is consistent with acute interstitial nephritis and some ATN with diuretic use  Her baseline serum creatinine is 1.2-1.3, follows with Dr. Concha Cody in the office  Urine and blood cultures negative. 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 Jacinto is in place with I and O as charted. Pulmonary edema better on current IV diuretics.     OBJECTIVE      CURRENT TEMPERATURE:  Temp: 97.9 °F (36.6 °C)  MAXIMUM TEMPERATURE OVER 24HRS:  Temp (24hrs), Av.1 °F (36.7 °C), Min:97.9 °F (36.6 °C), Max:98.4 °F (36.9 °C)    CURRENT RESPIRATORY RATE:  Resp: 18  CURRENT PULSE:  Pulse: 76  CURRENT BLOOD PRESSURE:  BP: (!) 142/63  24HR BLOOD PRESSURE RANGE:  Systolic (99GDG), OHX:193 , Min:114 , YZL:670   ; Diastolic (32YZU), VWB:83, Min:49, Max:63    24HR INTAKE/OUTPUT:      Intake/Output Summary (Last 24 hours) at 18 1030  Last data filed at 18 1312   Gross per 24 hour   Intake                0 ml   Output              800 ml   Net             -800 ml     WEIGHT :  Patient Vitals for the past 96 hrs (Last 3 readings):   Weight   18 0601 181 lb 4 oz (82.2 kg)   18 0455 181 lb 2 oz (82.2 kg)   18 0500 180 lb 9.6 oz (81.9 kg)     PHYSICAL EXAM      GENERAL APPEARANCE: awake and alert and comfortable on nasal cannula oxygen  SKIN: warm and dry, multiple areas of ecchymosis noted  EYES: conjunctivae pale, no scleral icterus   ENT:  moist mucus membranes  NECK:   Thick neck, midline trachea and no accessory muscle use  PULMONARY: Decreased breath sounds in the bases with some crackles as well bilaterally, scattered wheeses  CARDIOVASCULAR: Regular rate and rhythm  With a positive S1 and S2 and no rubs  ABDOMEN: obese and not tender and soft and mildly distended, bowel sounds are active  EXTREMITIES: large areas of firmness and bruising over the right upper arm with no edema or clubbing or cyanosis or calf tenderness, trace lower extremity edema    CURRENT MEDICATIONS        insulin lispro (HUMALOG) injection vial 0-18 Units TID WC   insulin lispro (HUMALOG) injection vial 0-9 Units Nightly   clindamycin (CLEOCIN) capsule 300 mg 3 times per day   levofloxacin (LEVAQUIN) tablet 250 mg Daily   fluconazole (DIFLUCAN) tablet 200 mg Daily   melatonin tablet 3 mg Nightly PRN   diltiazem (CARDIZEM CD) extended release capsule 120 mg Daily   furosemide (LASIX) injection 40 mg Q12H   magnesium oxide (MAG-OX) tablet 400 mg BID   polyethylene glycol (GLYCOLAX) packet 17 g Daily   pantoprazole (PROTONIX) tablet 40 mg BID AC   LORazepam (ATIVAN) injection 1 mg Q4H PRN   predniSONE (DELTASONE) tablet 20 mg BID   morphine (PF) injection 1 mg Q2H PRN   metoprolol succinate (TOPROL XL) extended release tablet 25 mg Daily   diatrizoate meglumine-sodium (GASTROGRAFIN) 66-10 % solution 30 mL ONCE PRN   ipratropium-albuterol (DUONEB) nebulizer solution 1 ampule TID   levalbuterol (XOPENEX) nebulizer solution 1.25 mg Q4H PRN   calcitRIOL (ROCALTROL) capsule 0.25 mcg Daily   amiodarone (CORDARONE) tablet 200 mg BID   metoprolol (LOPRESSOR) injection 5 mg Q4H PRN   acetaminophen (TYLENOL) tablet 650 mg Q4H PRN   aspirin EC tablet 81 mg Daily   atorvastatin (LIPITOR) tablet 20 mg Nightly mometasone-formoterol (DULERA) 200-5 MCG/ACT inhaler 2 puff BID   carbidopa-levodopa (SINEMET CR)  MG per extended release tablet 1 tablet 4x Daily   conjugated estrogens (PREMARIN) vaginal cream 0.5 g Every Other Day   ferrous sulfate EC tablet 325 mg BID WC   polyethylene glycol (GLYCOLAX) packet 17 g Daily PRN   rasagiline mesylate TABS 1 mg Daily   rOPINIRole (REQUIP) tablet 2 mg TID   senna (SENOKOT) tablet 17.2 mg Daily   vitamin D (CHOLECALCIFEROL) capsule 5,000 Units Daily   sodium chloride flush 0.9 % injection 10 mL 2 times per day   sodium chloride flush 0.9 % injection 10 mL PRN   glucose (GLUTOSE) 40 % oral gel 15 g PRN   dextrose 50 % solution 12.5 g PRN   glucagon (rDNA) injection 1 mg PRN   dextrose 5 % solution PRN   linagliptin (TRADJENTA) tablet 5 mg Daily   ondansetron (ZOFRAN-ODT) disintegrating tablet 4 mg Q6H PRN   Or    ondansetron (ZOFRAN) injection 4 mg Q6H PRN   sodium chloride flush 0.9 % injection 10 mL 2 times per day   sodium chloride flush 0.9 % injection 10 mL PRN   miconazole (MICOTIN) 2 % powder BID   promethazine (PHENERGAN) injection 12.5 mg Q6H PRN         LABS      CBC:   Recent Labs      08/31/18   0501  09/01/18   0530  09/02/18   0646   WBC  10.7  9.9  12.4*   RBC  2.32*  2.22*  2.50*   HGB  7.7*  7.3*  8.3*   HCT  24.3*  23.0*  25.9*   MCV  104.5*  103.6*  103.5*   MCH  33.0  32.8  33.1   MCHC  31.6  31.7  32.0   RDW  15.4*  15.3*  15.8*   PLT  253  240  295   MPV  7.9  7.9  7.8      BMP:   Recent Labs      08/31/18   0501  09/01/18   0530  09/02/18   0646   NA  143  139  139   K  4.5  4.0  4.0   CL  95*  93*  88*   CO2  34*  35*  37*   BUN  43*  43*  40*   CREATININE  1.95*  1.93*  1.86*   GLUCOSE  198*  265*  253*   CALCIUM  7.8*  7.5*  7.2*      MAGNESIUM:   Recent Labs      08/31/18   0501  09/01/18   0530  09/02/18   0646   MG  1.6  1.4*  1.6     ALBUMIN:   No results for input(s): LABALBU in the last 72 hours.   IRON:    Lab Results   Component Value Date continue to worsen and is at 1.93 mg/dl today. Baseline creatinine previously was thought to be 1.3 mg/dl. 2.  Hypertension: Blood pressures are normal at this time with the patient currently in NSR  3.  Baseline CKD stage III with a creatinine of 1.2-1.3 range Secondary to diabetic nephrosclerosis, follows up with Dr Sana Ramsey. 4.  Hypernatremia, improved. 5.  Hyperkalemia secondary to HPI, recent Bactrim use. Potassium is now normal with improvement in renal function and diuretic use  6. Pulmonary edema. 7. Anemia of chronic disease and blood loss, has been getting Aranesp as an outpatient  8. Hypomagnesemia. 9. Proteinuria with a previous UPC of 0.5-0.7. 10. Constipation. 11. Metabolic alkalosis secondary to diuresis. PLAN    1. Aranesp 100 mcg every 2 weeks with next scheduled dose on 9/11/18  2. Monitor the BMP, CBC, magnesium and phosphorus daily. 3. Close hemodynamic monitoring. 4. Hold PM dose. Decreased Lasix to 40 mg IV push every 12 hours. 5. Follow-up labs ordered. We will continue to closely follow with you. 6. BP reasonable, HR controlled at this time, cardiology following  7. Miralax daily for constipation    Please do not hesitate to call with questions.     Electronically signed by Esme Johnson MD on 9/2/2018 at 10:30 AM

## 2018-09-02 NOTE — PLAN OF CARE
Problem: Risk for Impaired Skin Integrity  Goal: Tissue integrity - skin and mucous membranes  Structural intactness and normal physiological function of skin and  mucous membranes. Outcome: Ongoing  Skin: scattered . Mucus membranes brusing. Patient turned and repositioned as needed. Heels elevated as needed. Dressings changed as needed and per orders. Continue to monitor skin for breakdown. Problem: Falls - Risk of:  Goal: Will remain free from falls  Will remain free from falls   Outcome: Ongoing  Patient is fall risk per fall scale. Falling star on door. Fall sticker on armband. Hourly rounding performed. Personal belongings and call light within reach. Bed in low position. Restraint alternatives in place. Problem: Pain:  Goal: Pain level will decrease  Pain level will decrease   Outcome: Ongoing  Patient states understanding of pain scale and interventions. Pain assessed with hourly rounding and PRN. Patient states pain level is none. Will continue to monitor.

## 2018-09-02 NOTE — PLAN OF CARE
[facility]  For more detailed information please refer to the progress note of the day      9/2/2018    5:35 PM    Name:   Emely Crew  MRN:     [id number]     Acct:      [de-identified]   Room:   [facility]-02   Day:  15  Admit Date:  8/20/2018  1:21 AM    PCP:   Florene Goltz, DO  Code Status:  Full Code        Pt vitals were reviewed   New labs were reviewed   Patient was seen    Updated plan :     1. Cont same  2. Diuresis  3.  Dc planning        Carlos Perez, DO  9/2/2018  5:35 PM

## 2018-09-02 NOTE — PROGRESS NOTES
Pulmonary Critical Care Progress Note  Susan Ko CNP / Dr. Sotero Guzman M.D. Patient seen for the follow up of Acute on chronic respiratory failure, pulmonary edema/pleural effusions, diastolic heart failure, exacerbation COPD    Subjective:  She is feeling quite well this morning. She remained somewhat short of breath though significantly improved from previous. She continues to have an occasional cough with sputum production. She denies chest pain. She was up ambulating with physical therapy earlier and tolerated activity fairly well though does complain of fatigue and weakness with activity. Examination:  Vitals: BP (!) 142/63   Pulse 76   Temp 97.9 °F (36.6 °C) (Oral)   Resp 18   Ht 5' 1\" (1.549 m)   Wt 181 lb 4 oz (82.2 kg)   LMP 04/03/2004 (Within Years)   SpO2 98%   BMI 34.25 kg/m²     General appearance: alert and cooperative with exam, PT at bedside  Neck: No JVD  Lungs: Decreased air exchange + wheezing  Heart: Irregular heart rhythm, controlled rate  Abdomen: Soft, non tender, + BS  Extremities: no cyanosis or clubbing. + edema    LABs:  CBC:   Recent Labs      09/01/18   0530  09/02/18   0646   WBC  9.9  12.4*   HGB  7.3*  8.3*   HCT  23.0*  25.9*   PLT  240  295     BMP:   Recent Labs      09/01/18   0530  09/02/18   0646   NA  139  139   K  4.0  4.0   CO2  35*  37*   BUN  43*  40*   CREATININE  1.93*  1.86*   LABGLOM  26*  27*   GLUCOSE  265*  253*     Impression:  · Acute on chronic respiratory failure  · Pulmonary edema/bilateral pleural effusion/diastolic heart failure  · Acute exacerbation of COPD  · 30-pack-year smoking  · Dysphagia/aspiration,?   Aspiration pneumonitis versus pneumonia  · Obstructive sleep apnea/obesity  · KRISTIN on Chronic kidney disease  · A. fib with RVR  · HDL/HTN/DM    Recommendations:  · Continue IV antibiotics, Levaquin/clindamycin  · Prednisone taper  · Continue dysphagia diet  · Albuterol and Ipratropium Q 4 hours and prn  · [facility] 200  · Diuresis  · Nephrology following   · Incentive spirometry every hour while awake  · Labs: CBC and BMP in am  · BiPAP, while asleep and when necessary  · 2 liters/min via nasal cannula  · Follow-up CT scan of the chest in 6-8 weeks  · DVT prophylaxis with low molecular weight heparin  · Will follow with you    Electronically signed by     ANSHUL Brunner CNP on 9/2/2018 at 11:51 AM  Patient was seen under the supervision of Dr. Vaughn Fernando and Sleep Medicine,    Patient seen and evaluated by me. She is sitting up in the bed eating lunch. She denies any chest pain. Her shortness of breath and cough is doing better. Lung exam reveals occasional wheeze but moderate exchange. Plan is to continue a prednisone taper, continue Levaquin and clindamycin for now. Continue diuresis. Bronchodilators. BiPAP machine while asleep. Patient was also told about follow-up CT scan in 6-8 weeks. Patient has been present in the room. Above was reviewed and discussed with Bryson Coreas CNP. And I agree with assessment and plan of care.   Electronically signed by     Arnav Hernandes MD on 9/2/2018 at 1:17 PM  Pulmonary Critical Care and Sleep Medicine,  [facility]  Cell: [phone]  Office: [phone]

## 2018-09-02 NOTE — PLAN OF CARE
Problem: Falls - Risk of:  Goal: Will remain free from falls  Will remain free from falls   Outcome: Ongoing  Patient alert & oriented ,uses call light appropriately. Wearing brief for occasional incontinence. Bed alarm on. Fall precautions continued.

## 2018-09-02 NOTE — PROGRESS NOTES
lesion by hot bx forceps (N/A, 4/25/2017); Cystorrhaphy (04/04/2018); pr cystourethroscopy,biopsies (N/A, 4/4/2018); bronchoscopy (06/05/2018); pr [facility] incl fluor gdnce dx w/cell washg spx (N/A, 6/5/2018); Upper gastrointestinal endoscopy (08/29/2018); and Upper gastrointestinal endoscopy (N/A, 8/29/2018). Restrictions  Restrictions/Precautions  Restrictions/Precautions: General Precautions, Fall Risk, Contact Precautions  Position Activity Restriction  Other position/activity restrictions: up with assist  Subjective   General  Chart Reviewed: Yes  Response To Previous Treatment: Patient with no complaints from previous session. Family / Caregiver Present: No  Subjective  Subjective: Pt is in bed upon arrival. Pt is agreeable to PT after encouragement. Pt is on 4LPM of O2 upon arrival.   General Comment  Comments: LILLY curry's pt for PT. Pain Screening  Patient Currently in Pain: Denies  Vital Signs  Patient Currently in Pain: Denies  Pre Treatment Pain Screening  Intervention List: Patient able to continue with treatment    Orientation  Orientation  Overall Orientation Status: Within Functional Limits  Objective   Bed mobility  Bridging: Minimal assistance  Rolling to Left: Minimal assistance  Rolling to Right: Minimal assistance  Supine to Sit: Minimal assistance  Sit to Supine: Minimal assistance  Scooting: Moderate assistance  Comment: HOB elevated. Pt utilzed bed rails to complete. Pt requires vc's for technique with good carryover. Transfers  Sit to Stand: Minimal Assistance  Stand to sit: Minimal Assistance  Comment: Pt requires vc's for safe transfer technique. Good carryover. Ambulation  Ambulation?: Yes  Ambulation 1  Surface: level tile  Device: Rolling Walker  Other Apparatus: O2 (Purewick female external catheter)  Assistance: Moderate assistance  Quality of Gait: Pt demos decrease cassia, decrease step length, and forward flexed posture.  Pt requires two standing rest breaks d/t fatigue and SOB. Pt instructed in breathing technique with fair follow through. Distance: 8'x2     Balance  Posture: Poor  Sitting - Static: Good  Sitting - Dynamic: Good;-  Standing - Static: Fair  Standing - Dynamic: Fair;-  Comments: standing balance assessed with RW. Exercises  Comments: Supine Ex: Ankle pumps, heels slides, Glut sets, Quad sets, HS sets, SAQ's, ABD/ADD. Reps: 10, Pt sat EOB x5 min for line management. Pt education in HEP with good understanding. Pt left in bed with call light in reach, bed alarm on, and all other needs addressed. Assessment   Body structures, Functions, Activity limitations: Decreased functional mobility ; Decreased strength;Decreased safe awareness;Decreased endurance;Decreased balance;Decreased coordination  Assessment: Pt improving tolerance for activity, possibly benefit from inpt rehab if continues to increase participation  Specific instructions for Next Treatment: progress gait/balance  Prognosis: Good  Patient Education: POC  REQUIRES PT FOLLOW UP: Yes  Activity Tolerance  Activity Tolerance: Patient limited by fatigue;Patient limited by endurance            AM-PAC Score  AM-PAC Inpatient Mobility Raw Score : 15  AM-PAC Inpatient T-Scale Score : 39.45  Mobility Inpatient CMS 0-100% Score: 57.7  Mobility Inpatient CMS G-Code Modifier : CK          Goals  Short term goals  Time Frame for Short term goals: 12 tx's  Short term goal 1: Bed mobiity independent   Short term goal 2: Pt transfer with CGA  Short term goal 3: Pt amb 25 ft with rw nd Patsy   Short term goal 4: Pt particpate in 30  minutes ther ex to increase strength for function  Patient Goals   Patient goals : Pt goal is to keep her strength up    Plan    Plan  Times per week: 1-2x/day. 5-6x/week.    Specific instructions for Next Treatment: progress gait/balance  Current Treatment Recommendations: Strengthening, ROM, Safety Education & Training, Positioning, Patient/Caregiver Education & Training, Home

## 2018-09-03 LAB
ANION GAP SERPL CALCULATED.3IONS-SCNC: 11 MMOL/L (ref 9–17)
BUN BLDV-MCNC: 39 MG/DL (ref 8–23)
BUN/CREAT BLD: 20 (ref 9–20)
CALCIUM SERPL-MCNC: 7.1 MG/DL (ref 8.6–10.4)
CHLORIDE BLD-SCNC: 90 MMOL/L (ref 98–107)
CO2: 40 MMOL/L (ref 20–31)
CREAT SERPL-MCNC: 1.91 MG/DL (ref 0.5–0.9)
GFR AFRICAN AMERICAN: 31 ML/MIN
GFR NON-AFRICAN AMERICAN: 26 ML/MIN
GFR SERPL CREATININE-BSD FRML MDRD: ABNORMAL ML/MIN/{1.73_M2}
GFR SERPL CREATININE-BSD FRML MDRD: ABNORMAL ML/MIN/{1.73_M2}
GLUCOSE BLD-MCNC: 154 MG/DL (ref 65–105)
GLUCOSE BLD-MCNC: 156 MG/DL (ref 70–99)
GLUCOSE BLD-MCNC: 182 MG/DL (ref 65–105)
GLUCOSE BLD-MCNC: 269 MG/DL (ref 65–105)
GLUCOSE BLD-MCNC: 307 MG/DL (ref 65–105)
MAGNESIUM: 1.7 MG/DL (ref 1.6–2.6)
POTASSIUM SERPL-SCNC: 3.6 MMOL/L (ref 3.7–5.3)
SODIUM BLD-SCNC: 141 MMOL/L (ref 135–144)

## 2018-09-03 PROCEDURE — 6360000002 HC RX W HCPCS: Performed by: NURSE PRACTITIONER

## 2018-09-03 PROCEDURE — 6360000002 HC RX W HCPCS: Performed by: SPECIALIST

## 2018-09-03 PROCEDURE — 6370000000 HC RX 637 (ALT 250 FOR IP): Performed by: INTERNAL MEDICINE

## 2018-09-03 PROCEDURE — 80048 BASIC METABOLIC PNL TOTAL CA: CPT

## 2018-09-03 PROCEDURE — 94640 AIRWAY INHALATION TREATMENT: CPT

## 2018-09-03 PROCEDURE — 97530 THERAPEUTIC ACTIVITIES: CPT

## 2018-09-03 PROCEDURE — 82947 ASSAY GLUCOSE BLOOD QUANT: CPT

## 2018-09-03 PROCEDURE — 83735 ASSAY OF MAGNESIUM: CPT

## 2018-09-03 PROCEDURE — 2060000000 HC ICU INTERMEDIATE R&B

## 2018-09-03 PROCEDURE — 97110 THERAPEUTIC EXERCISES: CPT

## 2018-09-03 PROCEDURE — 2580000003 HC RX 258: Performed by: RADIOLOGY

## 2018-09-03 PROCEDURE — 99232 SBSQ HOSP IP/OBS MODERATE 35: CPT | Performed by: INTERNAL MEDICINE

## 2018-09-03 PROCEDURE — 94760 N-INVAS EAR/PLS OXIMETRY 1: CPT

## 2018-09-03 PROCEDURE — 36415 COLL VENOUS BLD VENIPUNCTURE: CPT

## 2018-09-03 PROCEDURE — 2580000003 HC RX 258: Performed by: NURSE PRACTITIONER

## 2018-09-03 PROCEDURE — 6370000000 HC RX 637 (ALT 250 FOR IP): Performed by: NURSE PRACTITIONER

## 2018-09-03 PROCEDURE — 6360000002 HC RX W HCPCS: Performed by: INTERNAL MEDICINE

## 2018-09-03 PROCEDURE — 2700000000 HC OXYGEN THERAPY PER DAY

## 2018-09-03 RX ADMIN — CALCITRIOL 0.25 MCG: 0.25 CAPSULE ORAL at 09:10

## 2018-09-03 RX ADMIN — INSULIN LISPRO 3 UNITS: 100 INJECTION, SOLUTION INTRAVENOUS; SUBCUTANEOUS at 09:11

## 2018-09-03 RX ADMIN — FUROSEMIDE 40 MG: 10 INJECTION, SOLUTION INTRAMUSCULAR; INTRAVENOUS at 09:12

## 2018-09-03 RX ADMIN — FUROSEMIDE 40 MG: 10 INJECTION, SOLUTION INTRAMUSCULAR; INTRAVENOUS at 21:17

## 2018-09-03 RX ADMIN — RASAGILINE 1 MG: 1 TABLET ORAL at 09:25

## 2018-09-03 RX ADMIN — PANTOPRAZOLE SODIUM 40 MG: 40 TABLET, DELAYED RELEASE ORAL at 06:13

## 2018-09-03 RX ADMIN — SENNOSIDES 17.2 MG: 8.6 TABLET, FILM COATED ORAL at 09:10

## 2018-09-03 RX ADMIN — ATORVASTATIN CALCIUM 20 MG: 20 TABLET, FILM COATED ORAL at 21:17

## 2018-09-03 RX ADMIN — ACETAMINOPHEN 650 MG: 325 TABLET ORAL at 10:16

## 2018-09-03 RX ADMIN — CARBIDOPA AND LEVODOPA 1 TABLET: 25; 100 TABLET, EXTENDED RELEASE ORAL at 16:26

## 2018-09-03 RX ADMIN — LORAZEPAM 1 MG: 2 INJECTION, SOLUTION INTRAMUSCULAR; INTRAVENOUS at 23:37

## 2018-09-03 RX ADMIN — ROPINIROLE HYDROCHLORIDE 2 MG: 2 TABLET, FILM COATED ORAL at 21:17

## 2018-09-03 RX ADMIN — CLINDAMYCIN HYDROCHLORIDE 300 MG: 150 CAPSULE ORAL at 21:16

## 2018-09-03 RX ADMIN — AMIODARONE HYDROCHLORIDE 200 MG: 200 TABLET ORAL at 21:17

## 2018-09-03 RX ADMIN — INSULIN LISPRO 2 UNITS: 100 INJECTION, SOLUTION INTRAVENOUS; SUBCUTANEOUS at 21:18

## 2018-09-03 RX ADMIN — CARBIDOPA AND LEVODOPA 1 TABLET: 25; 100 TABLET, EXTENDED RELEASE ORAL at 09:11

## 2018-09-03 RX ADMIN — VITAMIN D, TAB 1000IU (100/BT) 2000 UNITS: 25 TAB at 09:11

## 2018-09-03 RX ADMIN — ACETAMINOPHEN 650 MG: 325 TABLET ORAL at 19:46

## 2018-09-03 RX ADMIN — MICONAZOLE NITRATE: 20.6 POWDER TOPICAL at 09:24

## 2018-09-03 RX ADMIN — LEVOFLOXACIN 250 MG: 250 TABLET, FILM COATED ORAL at 09:10

## 2018-09-03 RX ADMIN — LINAGLIPTIN 5 MG: 5 TABLET, FILM COATED ORAL at 09:10

## 2018-09-03 RX ADMIN — DILTIAZEM HYDROCHLORIDE 120 MG: 120 CAPSULE, COATED, EXTENDED RELEASE ORAL at 09:10

## 2018-09-03 RX ADMIN — Medication 10 ML: at 23:37

## 2018-09-03 RX ADMIN — IPRATROPIUM BROMIDE AND ALBUTEROL SULFATE 1 AMPULE: .5; 3 SOLUTION RESPIRATORY (INHALATION) at 05:56

## 2018-09-03 RX ADMIN — IPRATROPIUM BROMIDE AND ALBUTEROL SULFATE 1 AMPULE: .5; 3 SOLUTION RESPIRATORY (INHALATION) at 20:34

## 2018-09-03 RX ADMIN — IPRATROPIUM BROMIDE AND ALBUTEROL SULFATE 1 AMPULE: .5; 3 SOLUTION RESPIRATORY (INHALATION) at 15:56

## 2018-09-03 RX ADMIN — CLINDAMYCIN HYDROCHLORIDE 300 MG: 150 CAPSULE ORAL at 06:13

## 2018-09-03 RX ADMIN — FLUCONAZOLE 200 MG: 100 TABLET ORAL at 09:10

## 2018-09-03 RX ADMIN — FERROUS SULFATE TAB EC 325 MG (65 MG FE EQUIVALENT) 325 MG: 325 (65 FE) TABLET DELAYED RESPONSE at 09:11

## 2018-09-03 RX ADMIN — CLINDAMYCIN HYDROCHLORIDE 300 MG: 150 CAPSULE ORAL at 12:17

## 2018-09-03 RX ADMIN — INSULIN LISPRO 3 UNITS: 100 INJECTION, SOLUTION INTRAVENOUS; SUBCUTANEOUS at 12:18

## 2018-09-03 RX ADMIN — ASPIRIN 81 MG: 81 TABLET, COATED ORAL at 09:10

## 2018-09-03 RX ADMIN — INSULIN LISPRO 12 UNITS: 100 INJECTION, SOLUTION INTRAVENOUS; SUBCUTANEOUS at 17:13

## 2018-09-03 RX ADMIN — ROPINIROLE HYDROCHLORIDE 2 MG: 2 TABLET, FILM COATED ORAL at 09:10

## 2018-09-03 RX ADMIN — DARBEPOETIN ALFA 100 MCG: 100 INJECTION, SOLUTION INTRAVENOUS; SUBCUTANEOUS at 15:25

## 2018-09-03 RX ADMIN — CARBIDOPA AND LEVODOPA 1 TABLET: 25; 100 TABLET, EXTENDED RELEASE ORAL at 12:17

## 2018-09-03 RX ADMIN — ROPINIROLE HYDROCHLORIDE 2 MG: 2 TABLET, FILM COATED ORAL at 12:17

## 2018-09-03 RX ADMIN — PANTOPRAZOLE SODIUM 40 MG: 40 TABLET, DELAYED RELEASE ORAL at 16:26

## 2018-09-03 RX ADMIN — MAGNESIUM OXIDE TAB 400 MG (241.3 MG ELEMENTAL MG) 400 MG: 400 (241.3 MG) TAB at 21:16

## 2018-09-03 RX ADMIN — PREDNISONE 30 MG: 20 TABLET ORAL at 09:10

## 2018-09-03 RX ADMIN — MAGNESIUM OXIDE TAB 400 MG (241.3 MG ELEMENTAL MG) 400 MG: 400 (241.3 MG) TAB at 12:17

## 2018-09-03 RX ADMIN — AMIODARONE HYDROCHLORIDE 200 MG: 200 TABLET ORAL at 09:10

## 2018-09-03 RX ADMIN — FERROUS SULFATE TAB EC 325 MG (65 MG FE EQUIVALENT) 325 MG: 325 (65 FE) TABLET DELAYED RESPONSE at 16:26

## 2018-09-03 RX ADMIN — Medication 10 ML: at 21:18

## 2018-09-03 RX ADMIN — METOPROLOL SUCCINATE 25 MG: 25 TABLET, FILM COATED, EXTENDED RELEASE ORAL at 09:10

## 2018-09-03 RX ADMIN — CARBIDOPA AND LEVODOPA 1 TABLET: 25; 100 TABLET, EXTENDED RELEASE ORAL at 21:17

## 2018-09-03 RX ADMIN — Medication 10 ML: at 09:26

## 2018-09-03 RX ADMIN — Medication 2 PUFF: at 05:57

## 2018-09-03 ASSESSMENT — PAIN SCALES - GENERAL
PAINLEVEL_OUTOF10: 10
PAINLEVEL_OUTOF10: 8

## 2018-09-03 NOTE — PLAN OF CARE
[facility]    Second Visit Note  For more detailed information please refer to the progress note of the day      9/3/2018    5:35 PM    Name:   Argenis Hawkins  MRN:     [id number]     Acct:      [de-identified]   Room:   [facility]/1003-02   Day:  15  Admit Date:  8/20/2018  1:21 AM    PCP:   Dominga Adams DO  Code Status:  Full Code        Pt vitals were reviewed   New labs were reviewed   Patient was seen    Updated plan :     1. Patient has no acute complaints, awaiting precertification for LTAC.         Stephanie Nix DO  9/3/2018  5:35 PM

## 2018-09-03 NOTE — PROGRESS NOTES
Pulmonary Critical Care Progress Note       Patient seen for the follow up of Acute on chronic respiratory failure, pulmonary edema/pleural effusions, diastolic heart failure, exacerbation COPD    Subjective:    She has improved shortness of breath. She has  occasional cough with sputum production. She denies chest pain. She has fatigue and weakness with activity. She has poor appetite    Examination:  Vitals: BP (!) 115/45   Pulse 71   Temp 98.4 °F (36.9 °C) (Oral)   Resp 18   Ht 5' 1\" (1.549 m)   Wt 194 lb 8 oz (88.2 kg)   LMP 04/03/2004 (Within Years)   SpO2 94%   BMI 36.75 kg/m²     General appearance: alert and cooperative with exam  Neck: No JVD  Lungs: Decreased air exchange + wheezing  Heart: Irregular heart rhythm, controlled rate  Abdomen: Soft, non tender, + BS  Extremities: no cyanosis or clubbing. + edema    LABs:  CBC:   Recent Labs      09/01/18   0530  09/02/18   0646   WBC  9.9  12.4*   HGB  7.3*  8.3*   HCT  23.0*  25.9*   PLT  240  295     BMP:   Recent Labs      09/02/18   0646  09/03/18   0548   NA  139  141   K  4.0  3.6*   CO2  37*  40*   BUN  40*  39*   CREATININE  1.86*  1.91*   LABGLOM  27*  26*   GLUCOSE  253*  156*       Radiology      CXR 9/1  Improving multifocal airspace disease bilaterally and bilateral effusions. Impression:  · Acute on chronic respiratory failure  · Pulmonary edema/bilateral pleural effusion/diastolic heart failure  · Acute exacerbation of COPD  · 30-pack-year smoking  · Dysphagia/aspiration,?   Aspiration pneumonitis versus pneumonia  · Obstructive sleep apnea/obesity  · KRISTIN on Chronic kidney disease  · A. fib with RVR  · HDL/HTN/DM    Recommendations:  · O2 2 liters NC  · Incentive spirometry every hour WA  · Continue IV antibiotics, Levaquin/clindamycin  · Albuterol and Ipratropium Q 4 hours and prn  · Dulera 200  · Diuresis/Lasix 20 IV Q 12hr/Nephrology following   · Clinda and Levaquin  · Prednisone 30 mg PO QD taper  · Continue dysphagia diet  · Discussed with   · PTOT  · Follow-up CT scan of the chest in 6-8 weeks  · DVT prophylaxis with low molecular weight heparin          Krishan Grimm MD on 9/3/2018 at 1:41 PM  Pulmonary Critical Care and Sleep Medicine,  [facility] AT [place]: [phone]

## 2018-09-03 NOTE — PROGRESS NOTES
Nephrology Progress Note      SUBJECTIVE      Patient was seen and examined. Pt has been on EMMY as out patient. CT scan of the abdomen was negative for hydronephrosis  Initial serology which we have thus far is coming back negative as well, except for low C3 of doubtful clinical significance. .  Urine studies are indicating tubular damage, especially microscopic hematuria and pyuria  Paraprotein labs are negative  The clinical picture is consistent with acute interstitial nephritis and some ATN with diuretic use  Her baseline serum creatinine is 1.2-1.3, follows with Dr. Radha Welsh in the office  Urine and blood cultures negative. 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 Pulmonary edema better on current IV diuretics.     OBJECTIVE      CURRENT TEMPERATURE:  Temp: 98.4 °F (36.9 °C)  MAXIMUM TEMPERATURE OVER 24HRS:  Temp (24hrs), Av.1 °F (36.7 °C), Min:97.7 °F (36.5 °C), Max:98.4 °F (36.9 °C)    CURRENT RESPIRATORY RATE:  Resp: 18  CURRENT PULSE:  Pulse: 71  CURRENT BLOOD PRESSURE:  BP: (!) 115/45  24HR BLOOD PRESSURE RANGE:  Systolic (62SGK), HIB:047 , Min:94 , BJT:441   ; Diastolic (93VOY), LML:07, Min:42, Max:81    24HR INTAKE/OUTPUT:      Intake/Output Summary (Last 24 hours) at 18 1412  Last data filed at 18 9281   Gross per 24 hour   Intake              450 ml   Output             1150 ml   Net             -700 ml     WEIGHT :  Patient Vitals for the past 96 hrs (Last 3 readings):   Weight   18 1016 194 lb 8 oz (88.2 kg)   18 0601 181 lb 4 oz (82.2 kg)   18 0455 181 lb 2 oz (82.2 kg)     PHYSICAL EXAM      GENERAL APPEARANCE: awake and alert and comfortable on nasal cannula oxygen  SKIN: warm and dry, multiple areas of ecchymosis noted  EYES: conjunctivae pale, no scleral icterus   ENT:  moist mucus membranes  NECK:   Thick neck, midline trachea and no accessory muscle use  PULMONARY: Decreased breath sounds in the bases with some crackles as well bilaterally, scattered wheeses  CARDIOVASCULAR: Regular rate and rhythm  With a positive S1 and S2 and no rubs  ABDOMEN: obese and not tender and soft and mildly distended, bowel sounds are active  EXTREMITIES: large areas of firmness and bruising over the right upper arm with no edema or clubbing or cyanosis or calf tenderness, trace lower extremity edema    CURRENT MEDICATIONS        darbepoetin albaro-polysorbate (ARANESP) injection 100 mcg Weekly   insulin lispro (HUMALOG) injection vial 0-18 Units TID WC   insulin lispro (HUMALOG) injection vial 0-9 Units Nightly   clindamycin (CLEOCIN) capsule 300 mg 3 times per day   vitamin D (CHOLECALCIFEROL) tablet 2,000 Units Daily   predniSONE (DELTASONE) tablet 30 mg Daily   levofloxacin (LEVAQUIN) tablet 250 mg Daily   fluconazole (DIFLUCAN) tablet 200 mg Daily   melatonin tablet 3 mg Nightly PRN   diltiazem (CARDIZEM CD) extended release capsule 120 mg Daily   furosemide (LASIX) injection 40 mg Q12H   magnesium oxide (MAG-OX) tablet 400 mg BID   polyethylene glycol (GLYCOLAX) packet 17 g Daily   pantoprazole (PROTONIX) tablet 40 mg BID AC   LORazepam (ATIVAN) injection 1 mg Q4H PRN   morphine (PF) injection 1 mg Q2H PRN   metoprolol succinate (TOPROL XL) extended release tablet 25 mg Daily   diatrizoate meglumine-sodium (GASTROGRAFIN) 66-10 % solution 30 mL ONCE PRN   ipratropium-albuterol (DUONEB) nebulizer solution 1 ampule TID   levalbuterol (XOPENEX) nebulizer solution 1.25 mg Q4H PRN   calcitRIOL (ROCALTROL) capsule 0.25 mcg Daily   amiodarone (CORDARONE) tablet 200 mg BID   metoprolol (LOPRESSOR) injection 5 mg Q4H PRN   acetaminophen (TYLENOL) tablet 650 mg Q4H PRN   aspirin EC tablet 81 mg Daily   atorvastatin (LIPITOR) tablet 20 mg Nightly   mometasone-formoterol (DULERA) 200-5 MCG/ACT inhaler 2 puff BID   carbidopa-levodopa (SINEMET CR)  MG per extended 1. 2-1.3 range Secondary to diabetic nephrosclerosis, follows up with Dr Sana Ramsey. 4.  Hypernatremia, improved. 5.  Hyperkalemia secondary to HPI, recent Bactrim use. Potassium is now normal with improvement in renal function and diuretic use  6. Pulmonary edema. 7. Anemia of chronic disease and blood loss, has been getting Aranesp as an outpatient  8. Hypomagnesemia. 9. Proteinuria with a previous UPC of 0.5-0.7. 10. Constipation. 11. Metabolic alkalosis secondary to diuresis. PLAN      1. Will give one extra dose of Aranesp. (Aranesp 100 mcg every 2 weeks with next scheduled dose on 9/11/18)  2. Creat is stable Monitor the BMP, CBC, magnesium and phosphorus daily. 3. Close hemodynamic monitoring. 4. Lasix to 40 mg IV push every 12 hours. 5. Follow-up labs ordered. 6. BP below target. Cardiology following    Please do not hesitate to call with questions.     Electronically signed by Francesca Denise MD on 9/3/2018 at 2:12 PM

## 2018-09-03 NOTE — PLAN OF CARE
Problem: Risk for Impaired Skin Integrity  Goal: Tissue integrity - skin and mucous membranes  Structural intactness and normal physiological function of skin and  mucous membranes. Outcome: Ongoing  Skin assessment complete. Pt. Monitored for s/s of infection. Turn and reposition in bed, pressure reducing mattress, monitoring skin with every assessment and prn. Pressure ulcer preventions being practiced. Will continue to monitor. Problem: Falls - Risk of:  Goal: Will remain free from falls  Will remain free from falls   Outcome: Ongoing  Pt fall risk, fall band present, falling star, safety alarm activated and in use as needed. Bed in lowest position, call light within reach. Hourly rounding performed. Pt encouraged to use call light. See Sandeep Meraz fall risk assessment. Patient offered toileting assistance during rounding. Hourly rounds performed.

## 2018-09-03 NOTE — PLAN OF CARE
Problem: Falls - Risk of:  Goal: Will remain free from falls  Will remain free from falls   Outcome: Ongoing  Pt fall risk, fall band present, falling star, safety alarm activated and in use as needed. Hourly rounding performed. Pt encouraged to use call light. See Terrance Mancini fall risk assessment.

## 2018-09-03 NOTE — PROGRESS NOTES
Cardiovascular progress Note          Patient name: Tabitha Phoenix    YOB: 1945  Date of admission:  8/20/2018       Patient seen, examined. Previous clinical entries reviewed. All available laboratory, imaging and ancillary data reviewed. Subjective:      She has been steadily improving. No new significant rhythm issues overnight. She denies any,orthopnea or PND. Systems review:  Constitutional: No fever/chills. HENT: No headache, neck pain or neck stiffness. No sore throat or dysphagia. Gastrointestinal: No abdominal pain, nausea or vomiting. Cardiac: As Above  Respiratory: As above  Neurologic: No new focal weakness or numbness  Psychiatric: Normal mood and mentation       Examination:   Vitals: BP (!) 115/45   Pulse 71   Temp 98.4 °F (36.9 °C) (Oral)   Resp 18   Ht 5' 1\" (1.549 m)   Wt 194 lb 8 oz (88.2 kg)   LMP 04/03/2004 (Within Years)   SpO2 94%   BMI 36.75 kg/m²     Intake/Output Summary (Last 24 hours) at 09/03/18 1128  Last data filed at 09/03/18 0639   Gross per 24 hour   Intake              450 ml   Output             1150 ml   Net             -700 ml       General appearance: Comfortable in no apparent distress. HEENT: + pallor. No icterus  Neck: Supple. Lungs:Generally decreased breath sounds. Heart: S1,S2  Abdomen: Soft  Extremities: + peripheral edema  Skin: No obvious rashes. Musculoskeletal: No obvious deformities. Neurologic: No focal deficits.      Labs/ Ancillary data:     CBC:   Recent Labs      09/01/18   0530  09/02/18   0646   WBC  9.9  12.4*   HGB  7.3*  8.3*   PLT  240  295     BMP:    Recent Labs      09/01/18   0530  09/02/18   0646  09/03/18   0548   NA  139  139  141   K  4.0  4.0  3.6*   CL  93*  88*  90*   CO2  35*  37*  40*   BUN  43*  40*  39*   CREATININE  1.93*  1.86*  1.91*   GLUCOSE  265*  253*  156*       Medications:   Scheduled Meds:   insulin lispro  0-18 Units Subcutaneous TID WC    insulin lispro  0-9 Units Subcutaneous

## 2018-09-03 NOTE — PROGRESS NOTES
[facility]    Progress Note    9/3/2018    9:59 AM    Name:   Katie Brody  MRN:     [id number]     Acct:      [de-identified]   Room:   [address] [place] [address] Day:  15  Admit Date:  8/20/2018  1:21 AM    PCP:   Enoc Leon DO  Code Status:  Full Code    Subjective:     C/C:   Chief Complaint   Patient presents with    Emesis    Nausea     Interval History Status: improved. Patient denies any complaints of chest pain, shortness breath, nausea or vomiting. Brief History: This is 68-year-old white female who is noted with a complaint of nausea, vomiting and diarrhea and found have acute kidney injury and hypotension.  She is admitted into the intensive care unit and placed on IV fluids and Levophed drip.  She has recently been on Bactrim as an outpatient for urinary tract infection.     On the evening of August 22, 2018 she developed shortness of breath and respiratory distress requiring BiPAP placement.  Chest x-ray August 23 showing pulmonary edema.  IV fluids were discontinued     On the evening of August 23 round 6:30 PM she developed rapid atrial fibrillation and was placed on Cardizem drip as well as heparin drip. cardiac enzymes are negative.  Throughout the night she converted to sinus rhythm and Cardizem drip was weaned off.  She's had recurrent episodes of atrial fibrillation with rapid response and cardiology has increased her amiodarone to 200 mg twice a day    Review of Systems:     Constitutional:  negative for chills, fevers, sweats  Respiratory:  negative for cough, dyspnea on exertion, shortness of breath, wheezing  Cardiovascular:  negative for chest pain, chest pressure/discomfort, lower extremity edema, palpitations  Gastrointestinal:  negative for abdominal pain, constipation, diarrhea, nausea, vomiting  Neurological:  negative for dizziness, headache    Medications: Allergies:     Allergies   Allergen Reactions    Dye (24Hr):     Intake/Output Summary (Last 24 hours) at 09/03/18 0959  Last data filed at 09/03/18 0639   Gross per 24 hour   Intake              450 ml   Output             1150 ml   Net             -700 ml       Labs:    Hematology:  Recent Labs      09/01/18   0530  09/02/18   0646   WBC  9.9  12.4*   RBC  2.22*  2.50*   HGB  7.3*  8.3*   HCT  23.0*  25.9*   MCV  103.6*  103.5*   MCH  32.8  33.1   MCHC  31.7  32.0   RDW  15.3*  15.8*   PLT  240  295   MPV  7.9  7.8     Chemistry:  Recent Labs      09/01/18   0530  09/02/18   0646  09/03/18   0548   NA  139  139  141   K  4.0  4.0  3.6*   CL  93*  88*  90*   CO2  35*  37*  40*   GLUCOSE  265*  253*  156*   BUN  43*  40*  39*   CREATININE  1.93*  1.86*  1.91*   MG  1.4*  1.6  1.7   ANIONGAP  11  14  11   LABGLOM  26*  27*  26*   GFRAA  31*  32*  31*   CALCIUM  7.5*  7.2*  7.1*   PHOS   --   4.1   --      Recent Labs      09/01/18   2039  09/02/18   0809  09/02/18   1216  09/02/18   1653  09/02/18   2046  09/03/18   0747   POCGLU  197*  254*  223*  234*  260*  154*         Lab Results   Component Value Date/Time    SPECIAL LT HAND 7ML 08/20/2018 03:45 AM     Lab Results   Component Value Date/Time    CULTURE NO GROWTH 6 DAYS 08/20/2018 03:45 AM       Lab Results   Component Value Date    POCPH 7.36 06/02/2013    POCPCO2 55 06/02/2013    POCPO2 63 06/02/2013    POCHCO3 31.0 06/02/2013    NBEA NOT REPORTED 06/02/2013    PBEA 6 06/02/2013    CWE4JRD 33 06/02/2013    NSOL5YBR 90 06/02/2013    FIO2 45.0 01/15/2017       Radiology:    No new radiology reports    Physical Examination:        General appearance:  alert, cooperative and no distress  Mental Status:  oriented to person, place and time and normal affect  Lungs:  clear to auscultation bilaterally, normal effort  Heart:  regular rate and rhythm, no murmur  Abdomen:  soft, nontender, nondistended, normal bowel sounds, no masses, hepatomegaly, splenomegaly  Extremities:  no edema, redness, tenderness in the calves  Skin:  no gross lesions, rashes, induration    Assessment:        Primary Problem  Acute kidney injury superimposed on chronic kidney disease [facility])    Active Hospital Problems    Diagnosis Date Noted    Candida esophagitis ([facility] [B37.81] 08/31/2018    Odynophagia [R13.10] 08/28/2018    Esophageal dysphagia [R13.10]     Hypomagnesemia [E83.42] 08/27/2018    Thrombocytopenia (HCC) [D69.6] 08/25/2018    Hypovolemic shock ([address] [R57.1] 08/24/2018    Acute hypoxemic respiratory failure (HCC) [J96.01] 08/23/2018    Rapid atrial fibrillation (HCC) [I48.91] 08/23/2018    SIRS (systemic inflammatory response syndrome) (HCC) [R65.10] 08/20/2018    Nausea and vomiting in adult [R11.2] 08/20/2018    Acute kidney injury superimposed on chronic kidney disease ([address] [N17.9, N18.9] 08/20/2018    Bacterial UTI [N39.0, A49.9] 08/20/2018    Dehydration [E86.0]     Anemia of chronic renal failure, stage 4 (severe) ([facility] [N18.4, D63.1] 04/25/2018    CKD stage 3 due to type 2 diabetes mellitus ([address] [E11.22, N18.3] 03/08/2018    ECTOR on CPAP [G47.33, Z99.89]     Chronic atrial fibrillation ([facility] [I48.2] 05/31/2013    Essential hypertension [I10] 06/07/2012    Controlled type 2 diabetes mellitus with stage 3 chronic kidney disease, without long-term current use of insulin ([facility] [E11.22, N18.3]        Plan:        1. Diflucan as ordered  2. Fluids per nephrology  3. Monitor and control blood pressure  4. Glycemic control  5. BiPAP nightly and as needed  6. PT and OT, ST  7. Aerosols as ordered, oxygen as needed  8.  LTAC evaluation    Ele Hanley DO  9/3/2018  9:59 AM

## 2018-09-03 NOTE — PROGRESS NOTES
Colonoscopy (04/25/2017); pr colsc flx w/removal lesion by hot bx forceps (N/A, 4/25/2017); Cystorrhaphy (04/04/2018); pr cystourethroscopy,biopsies (N/A, 4/4/2018); bronchoscopy (06/05/2018); pr [facility] incl fluor gdnce dx w/cell washg spx (N/A, 6/5/2018); Upper gastrointestinal endoscopy (08/29/2018); and Upper gastrointestinal endoscopy (N/A, 8/29/2018). Restrictions  Restrictions/Precautions  Restrictions/Precautions: General Precautions, Fall Risk, Contact Precautions  Position Activity Restriction  Other position/activity restrictions: up with assist  Subjective   General  Chart Reviewed: Yes  Family / Caregiver Present: No  General Comment  Comments: RN [place] ok's pt for PT. Pre Treatment Pain Screening  Intervention List: Patient able to continue with treatment    Orientation  Orientation  Overall Orientation Status: Within Functional Limits  Objective      Transfers  Sit to Stand: Minimal Assistance  Stand to sit: Minimal Assistance  Bed to Chair: Moderate assistance  Ambulation  Ambulation?: No  Attempted ambulation however pt brief needed changed requiring sit to stands and static standing. Pt reports she was to fatigued to ambulate. Balance  Posture: Poor  Sitting - Static: Good  Sitting - Dynamic: Good;-  Standing - Static: Fair  Standing - Dynamic: Fair;-  Exercises  Comments: Seated LE exercises for ROM and circulation. Other exercises  Other exercises?: Yes  Other exercises 1: Sit to stands x 5 with min A. CGA for static standing balance         Other Activities: Other (see comment) (Assisted with brief changing requiring max assit to gaby/doff brief)              Assessment   Body structures, Functions, Activity limitations: Decreased functional mobility ; Decreased strength;Decreased safe awareness;Decreased endurance;Decreased balance;Decreased coordination  Assessment: Pt would benefit from continued therapy  after D/C to restore ROM, strength and increase independence with functional mobility. Specific instructions for Next Treatment: progress gait/balance  Prognosis: Good  REQUIRES PT FOLLOW UP: Yes  Activity Tolerance  Activity Tolerance: Patient limited by fatigue;Patient limited by endurance       AM-PAC Score     AM-PAC Inpatient Mobility without Stair Climbing Raw Score : 12  AM-PAC Inpatient without Stair Climbing T-Scale Score : 37.26  Mobility Inpatient CMS 0-100% Score: 63.03  Mobility Inpatient without Stair CMS G-Code Modifier : CL       Goals  Short term goals  Time Frame for Short term goals: 12 tx's  Short term goal 1: Bed mobiity independent   Short term goal 2: Pt transfer with CGA  Short term goal 3: Pt amb 25 ft with rw nd Patsy   Short term goal 4: Pt particpate in 30  minutes ther ex to increase strength for function  Patient Goals   Patient goals : Pt goal is to keep her strength up and go home    Plan    Plan  Times per week: 1-2x/day. 5-6x/week.    Specific instructions for Next Treatment: progress gait/balance  Current Treatment Recommendations: Strengthening, ROM, Safety Education & Training, Positioning, Patient/Caregiver Education & Training, Home Exercise Program  Safety Devices  Type of devices: Nurse notified, Left in chair, Gait belt, Call light within reach, All fall risk precautions in place  Restraints  Initially in place: No     Therapy Time   Individual Concurrent Group Co-treatment   Time In  1219         Time Out  1252         Minutes  [address] [place] [address]

## 2018-09-04 ENCOUNTER — TELEPHONE (OUTPATIENT)
Dept: ONCOLOGY | Age: 73
End: 2018-09-04

## 2018-09-04 ENCOUNTER — HOSPITAL ENCOUNTER (OUTPATIENT)
Dept: INFUSION THERAPY | Facility: MEDICAL CENTER | Age: 73
End: 2018-09-04
Payer: COMMERCIAL

## 2018-09-04 LAB
ANION GAP SERPL CALCULATED.3IONS-SCNC: 13 MMOL/L (ref 9–17)
BUN BLDV-MCNC: 40 MG/DL (ref 8–23)
BUN/CREAT BLD: 21 (ref 9–20)
CALCIUM SERPL-MCNC: 7 MG/DL (ref 8.6–10.4)
CHLORIDE BLD-SCNC: 89 MMOL/L (ref 98–107)
CO2: 40 MMOL/L (ref 20–31)
CREAT SERPL-MCNC: 1.94 MG/DL (ref 0.5–0.9)
GFR AFRICAN AMERICAN: 31 ML/MIN
GFR NON-AFRICAN AMERICAN: 25 ML/MIN
GFR SERPL CREATININE-BSD FRML MDRD: ABNORMAL ML/MIN/{1.73_M2}
GFR SERPL CREATININE-BSD FRML MDRD: ABNORMAL ML/MIN/{1.73_M2}
GLUCOSE BLD-MCNC: 155 MG/DL (ref 65–105)
GLUCOSE BLD-MCNC: 182 MG/DL (ref 65–105)
GLUCOSE BLD-MCNC: 203 MG/DL (ref 70–99)
GLUCOSE BLD-MCNC: 259 MG/DL (ref 65–105)
GLUCOSE BLD-MCNC: 423 MG/DL (ref 65–105)
MAGNESIUM: 1.6 MG/DL (ref 1.6–2.6)
PLATELET # BLD: 295 K/UL (ref 130–400)
POTASSIUM SERPL-SCNC: 3.5 MMOL/L (ref 3.7–5.3)
SODIUM BLD-SCNC: 142 MMOL/L (ref 135–144)

## 2018-09-04 PROCEDURE — 36415 COLL VENOUS BLD VENIPUNCTURE: CPT

## 2018-09-04 PROCEDURE — 6370000000 HC RX 637 (ALT 250 FOR IP): Performed by: INTERNAL MEDICINE

## 2018-09-04 PROCEDURE — 82947 ASSAY GLUCOSE BLOOD QUANT: CPT

## 2018-09-04 PROCEDURE — 80048 BASIC METABOLIC PNL TOTAL CA: CPT

## 2018-09-04 PROCEDURE — 97110 THERAPEUTIC EXERCISES: CPT

## 2018-09-04 PROCEDURE — 97535 SELF CARE MNGMENT TRAINING: CPT

## 2018-09-04 PROCEDURE — 99232 SBSQ HOSP IP/OBS MODERATE 35: CPT | Performed by: INTERNAL MEDICINE

## 2018-09-04 PROCEDURE — G8987 SELF CARE CURRENT STATUS: HCPCS

## 2018-09-04 PROCEDURE — 94640 AIRWAY INHALATION TREATMENT: CPT

## 2018-09-04 PROCEDURE — 97530 THERAPEUTIC ACTIVITIES: CPT

## 2018-09-04 PROCEDURE — 6360000002 HC RX W HCPCS: Performed by: NURSE PRACTITIONER

## 2018-09-04 PROCEDURE — 2700000000 HC OXYGEN THERAPY PER DAY

## 2018-09-04 PROCEDURE — 2060000000 HC ICU INTERMEDIATE R&B

## 2018-09-04 PROCEDURE — 6360000002 HC RX W HCPCS: Performed by: INTERNAL MEDICINE

## 2018-09-04 PROCEDURE — 85049 AUTOMATED PLATELET COUNT: CPT

## 2018-09-04 PROCEDURE — 94760 N-INVAS EAR/PLS OXIMETRY 1: CPT

## 2018-09-04 PROCEDURE — 83735 ASSAY OF MAGNESIUM: CPT

## 2018-09-04 PROCEDURE — 6370000000 HC RX 637 (ALT 250 FOR IP): Performed by: NURSE PRACTITIONER

## 2018-09-04 PROCEDURE — 97166 OT EVAL MOD COMPLEX 45 MIN: CPT

## 2018-09-04 PROCEDURE — 2580000003 HC RX 258: Performed by: RADIOLOGY

## 2018-09-04 PROCEDURE — G8988 SELF CARE GOAL STATUS: HCPCS

## 2018-09-04 PROCEDURE — 2580000003 HC RX 258: Performed by: NURSE PRACTITIONER

## 2018-09-04 PROCEDURE — 97116 GAIT TRAINING THERAPY: CPT

## 2018-09-04 RX ORDER — POTASSIUM CHLORIDE 20 MEQ/1
40 TABLET, EXTENDED RELEASE ORAL ONCE
Status: COMPLETED | OUTPATIENT
Start: 2018-09-04 | End: 2018-09-04

## 2018-09-04 RX ORDER — PREDNISONE 20 MG/1
20 TABLET ORAL DAILY
Status: DISCONTINUED | OUTPATIENT
Start: 2018-09-05 | End: 2018-09-07 | Stop reason: HOSPADM

## 2018-09-04 RX ADMIN — FERROUS SULFATE TAB EC 325 MG (65 MG FE EQUIVALENT) 325 MG: 325 (65 FE) TABLET DELAYED RESPONSE at 17:33

## 2018-09-04 RX ADMIN — DILTIAZEM HYDROCHLORIDE 120 MG: 120 CAPSULE, COATED, EXTENDED RELEASE ORAL at 09:16

## 2018-09-04 RX ADMIN — Medication 10 ML: at 21:11

## 2018-09-04 RX ADMIN — ROPINIROLE HYDROCHLORIDE 2 MG: 2 TABLET, FILM COATED ORAL at 09:16

## 2018-09-04 RX ADMIN — INSULIN LISPRO 5 UNITS: 100 INJECTION, SOLUTION INTRAVENOUS; SUBCUTANEOUS at 21:29

## 2018-09-04 RX ADMIN — AMIODARONE HYDROCHLORIDE 200 MG: 200 TABLET ORAL at 09:15

## 2018-09-04 RX ADMIN — IPRATROPIUM BROMIDE AND ALBUTEROL SULFATE 1 AMPULE: .5; 3 SOLUTION RESPIRATORY (INHALATION) at 20:28

## 2018-09-04 RX ADMIN — CARBIDOPA AND LEVODOPA 1 TABLET: 25; 100 TABLET, EXTENDED RELEASE ORAL at 17:33

## 2018-09-04 RX ADMIN — ACETAMINOPHEN 650 MG: 325 TABLET ORAL at 17:37

## 2018-09-04 RX ADMIN — FUROSEMIDE 40 MG: 10 INJECTION, SOLUTION INTRAMUSCULAR; INTRAVENOUS at 09:15

## 2018-09-04 RX ADMIN — PREDNISONE 30 MG: 20 TABLET ORAL at 09:15

## 2018-09-04 RX ADMIN — IPRATROPIUM BROMIDE AND ALBUTEROL SULFATE 1 AMPULE: .5; 3 SOLUTION RESPIRATORY (INHALATION) at 15:28

## 2018-09-04 RX ADMIN — INSULIN LISPRO 3 UNITS: 100 INJECTION, SOLUTION INTRAVENOUS; SUBCUTANEOUS at 09:16

## 2018-09-04 RX ADMIN — Medication 10 ML: at 21:12

## 2018-09-04 RX ADMIN — CARBIDOPA AND LEVODOPA 1 TABLET: 25; 100 TABLET, EXTENDED RELEASE ORAL at 21:10

## 2018-09-04 RX ADMIN — METOPROLOL SUCCINATE 25 MG: 25 TABLET, FILM COATED, EXTENDED RELEASE ORAL at 09:15

## 2018-09-04 RX ADMIN — CLINDAMYCIN HYDROCHLORIDE 300 MG: 150 CAPSULE ORAL at 13:30

## 2018-09-04 RX ADMIN — ATORVASTATIN CALCIUM 20 MG: 20 TABLET, FILM COATED ORAL at 21:10

## 2018-09-04 RX ADMIN — IPRATROPIUM BROMIDE AND ALBUTEROL SULFATE 1 AMPULE: .5; 3 SOLUTION RESPIRATORY (INHALATION) at 09:33

## 2018-09-04 RX ADMIN — MAGNESIUM OXIDE TAB 400 MG (241.3 MG ELEMENTAL MG) 400 MG: 400 (241.3 MG) TAB at 12:20

## 2018-09-04 RX ADMIN — MICONAZOLE NITRATE: 20.6 POWDER TOPICAL at 09:32

## 2018-09-04 RX ADMIN — LORAZEPAM 1 MG: 2 INJECTION, SOLUTION INTRAMUSCULAR; INTRAVENOUS at 23:38

## 2018-09-04 RX ADMIN — ASPIRIN 81 MG: 81 TABLET, COATED ORAL at 09:15

## 2018-09-04 RX ADMIN — CLINDAMYCIN HYDROCHLORIDE 300 MG: 150 CAPSULE ORAL at 05:32

## 2018-09-04 RX ADMIN — LINAGLIPTIN 5 MG: 5 TABLET, FILM COATED ORAL at 09:15

## 2018-09-04 RX ADMIN — LEVOFLOXACIN 250 MG: 250 TABLET, FILM COATED ORAL at 09:15

## 2018-09-04 RX ADMIN — FERROUS SULFATE TAB EC 325 MG (65 MG FE EQUIVALENT) 325 MG: 325 (65 FE) TABLET DELAYED RESPONSE at 09:15

## 2018-09-04 RX ADMIN — ROPINIROLE HYDROCHLORIDE 2 MG: 2 TABLET, FILM COATED ORAL at 21:10

## 2018-09-04 RX ADMIN — Medication 10 ML: at 09:19

## 2018-09-04 RX ADMIN — VITAMIN D, TAB 1000IU (100/BT) 2000 UNITS: 25 TAB at 09:15

## 2018-09-04 RX ADMIN — CALCITRIOL 0.25 MCG: 0.25 CAPSULE ORAL at 09:15

## 2018-09-04 RX ADMIN — INSULIN LISPRO 3 UNITS: 100 INJECTION, SOLUTION INTRAVENOUS; SUBCUTANEOUS at 12:20

## 2018-09-04 RX ADMIN — AMIODARONE HYDROCHLORIDE 200 MG: 200 TABLET ORAL at 21:10

## 2018-09-04 RX ADMIN — CARBIDOPA AND LEVODOPA 1 TABLET: 25; 100 TABLET, EXTENDED RELEASE ORAL at 12:20

## 2018-09-04 RX ADMIN — POTASSIUM CHLORIDE 40 MEQ: 20 TABLET, EXTENDED RELEASE ORAL at 13:51

## 2018-09-04 RX ADMIN — INSULIN LISPRO 18 UNITS: 100 INJECTION, SOLUTION INTRAVENOUS; SUBCUTANEOUS at 17:33

## 2018-09-04 RX ADMIN — CARBIDOPA AND LEVODOPA 1 TABLET: 25; 100 TABLET, EXTENDED RELEASE ORAL at 09:15

## 2018-09-04 RX ADMIN — PANTOPRAZOLE SODIUM 40 MG: 40 TABLET, DELAYED RELEASE ORAL at 17:33

## 2018-09-04 RX ADMIN — PANTOPRAZOLE SODIUM 40 MG: 40 TABLET, DELAYED RELEASE ORAL at 07:05

## 2018-09-04 RX ADMIN — FUROSEMIDE 40 MG: 10 INJECTION, SOLUTION INTRAMUSCULAR; INTRAVENOUS at 21:10

## 2018-09-04 RX ADMIN — CONJUGATED ESTROGENS 0.5 G: 0.62 CREAM VAGINAL at 09:32

## 2018-09-04 RX ADMIN — RASAGILINE 1 MG: 1 TABLET ORAL at 09:32

## 2018-09-04 RX ADMIN — MAGNESIUM OXIDE TAB 400 MG (241.3 MG ELEMENTAL MG) 400 MG: 400 (241.3 MG) TAB at 21:10

## 2018-09-04 RX ADMIN — ACETAMINOPHEN 650 MG: 325 TABLET ORAL at 05:32

## 2018-09-04 RX ADMIN — ROPINIROLE HYDROCHLORIDE 2 MG: 2 TABLET, FILM COATED ORAL at 13:30

## 2018-09-04 RX ADMIN — FLUCONAZOLE 200 MG: 100 TABLET ORAL at 09:16

## 2018-09-04 ASSESSMENT — PAIN SCALES - GENERAL
PAINLEVEL_OUTOF10: 9
PAINLEVEL_OUTOF10: 7

## 2018-09-04 NOTE — PROGRESS NOTES
Nephrology Progress Note      SUBJECTIVE    Patient seen and examined. Hemodynamically stable. Patient is sitting comfortably. She denies increasing shortness of breath. Improvement in urine output noted  Hemodynamically stable  Receiving Lasix IV twice a day.     OBJECTIVE      CURRENT TEMPERATURE:  Temp: 98.1 °F (36.7 °C)  MAXIMUM TEMPERATURE OVER 24HRS:  Temp (24hrs), Av.8 °F (36.6 °C), Min:97.2 °F (36.2 °C), Max:98.2 °F (36.8 °C)    CURRENT RESPIRATORY RATE:  Resp: 18  CURRENT PULSE:  Pulse: 72  CURRENT BLOOD PRESSURE:  BP: (!) 130/49  24HR BLOOD PRESSURE RANGE:  Systolic (91TVH), FQO:642 , Min:117 , OVR:362   ; Diastolic (78GVK), GGN:94, Min:47, Max:70    24HR INTAKE/OUTPUT:      Intake/Output Summary (Last 24 hours) at 18 1220  Last data filed at 18 0830   Gross per 24 hour   Intake              480 ml   Output              402 ml   Net               78 ml     WEIGHT :  Patient Vitals for the past 96 hrs (Last 3 readings):   Weight   18 1016 194 lb 8 oz (88.2 kg)   18 0601 181 lb 4 oz (82.2 kg)     PHYSICAL EXAM    GENERAL APPEARANCE:Sitting comfortably alert and oriented ×3  SKIN: Warm to touch and no erythema  EYES: Conjunctiva was pink  ENT:  Moist mucous membranes  NECK:   Neck was supple no lymphadenopathy no accessory muscle use   PULMONARY: Patient has scattered crackles all over the chest.  No wheezing  CARDIOVASCULAR: S1 and S2 audible no S3  ABDOMEN: Soft nontender bowel sounds was positive  EXTREMITIES: No edema   CURRENT MEDICATIONS        insulin lispro (HUMALOG) injection vial 0-18 Units TID WC   insulin lispro (HUMALOG) injection vial 0-9 Units Nightly   clindamycin (CLEOCIN) capsule 300 mg 3 times per day   vitamin D (CHOLECALCIFEROL) tablet 2,000 Units Daily   predniSONE (DELTASONE) tablet 30 mg Daily   levofloxacin (LEVAQUIN) tablet 250 mg Daily   fluconazole (DIFLUCAN) tablet 200 mg Daily   melatonin tablet 3 mg Nightly PRN   diltiazem (CARDIZEM CD) extended release capsule 120 mg Daily   furosemide (LASIX) injection 40 mg Q12H   magnesium oxide (MAG-OX) tablet 400 mg BID   polyethylene glycol (GLYCOLAX) packet 17 g Daily   pantoprazole (PROTONIX) tablet 40 mg BID AC   LORazepam (ATIVAN) injection 1 mg Q4H PRN   morphine (PF) injection 1 mg Q2H PRN   metoprolol succinate (TOPROL XL) extended release tablet 25 mg Daily   diatrizoate meglumine-sodium (GASTROGRAFIN) 66-10 % solution 30 mL ONCE PRN   ipratropium-albuterol (DUONEB) nebulizer solution 1 ampule TID   levalbuterol (XOPENEX) nebulizer solution 1.25 mg Q4H PRN   calcitRIOL (ROCALTROL) capsule 0.25 mcg Daily   amiodarone (CORDARONE) tablet 200 mg BID   metoprolol (LOPRESSOR) injection 5 mg Q4H PRN   acetaminophen (TYLENOL) tablet 650 mg Q4H PRN   aspirin EC tablet 81 mg Daily   atorvastatin (LIPITOR) tablet 20 mg Nightly   mometasone-formoterol (DULERA) 200-5 MCG/ACT inhaler 2 puff BID   carbidopa-levodopa (SINEMET CR)  MG per extended release tablet 1 tablet 4x Daily   conjugated estrogens (PREMARIN) vaginal cream 0.5 g Every Other Day   ferrous sulfate EC tablet 325 mg BID WC   polyethylene glycol (GLYCOLAX) packet 17 g Daily PRN   rasagiline mesylate TABS 1 mg Daily   rOPINIRole (REQUIP) tablet 2 mg TID   senna (SENOKOT) tablet 17.2 mg Daily   sodium chloride flush 0.9 % injection 10 mL 2 times per day   sodium chloride flush 0.9 % injection 10 mL PRN   glucose (GLUTOSE) 40 % oral gel 15 g PRN   dextrose 50 % solution 12.5 g PRN   glucagon (rDNA) injection 1 mg PRN   dextrose 5 % solution PRN   linagliptin (TRADJENTA) tablet 5 mg Daily   ondansetron (ZOFRAN-ODT) disintegrating tablet 4 mg Q6H PRN   Or    ondansetron (ZOFRAN) injection 4 mg Q6H PRN   sodium chloride flush 0.9 % injection 10 mL 2 times per day   sodium chloride flush 0.9 % injection 10 mL PRN   miconazole (MICOTIN) 2 % powder BID   promethazine (PHENERGAN) injection 12.5 mg Q6H PRN         LABS      CBC:   Recent Labs      09/02/18   0646

## 2018-09-04 NOTE — PROGRESS NOTES
Physical Therapy     09/04/18 1036   Restrictions/Precautions   Restrictions/Precautions General Precautions; Fall Risk;Contact Precautions   Required Braces or Orthoses? No   General   Chart Reviewed Yes   Response To Previous Treatment Patient with no complaints from previous session. Family / Caregiver Present Yes  ()   Subjective   Subjective Pt. in chair when PT arrived   General Comment   Comments OK for PT per Dave CARR   Pain Screening   Patient Currently in Pain Denies   Intervention List Patient able to continue with treatment   Patient Observation   Observations O2 @ 2L   Orientation   Overall Orientation Status WNL   Transfers   Sit to Stand Contact guard assistance   Stand to sit Contact guard assistance   Stand Pivot Transfers Contact guard assistance   Ambulation   Ambulation? Yes   Ambulation 1   Surface level tile   Device Rolling Walker   Other Apparatus O2  (2L)   Assistance Contact guard assistance   Quality of Gait Diminished step length   Distance 45' x 1   Comments Back to chair   Stairs/Curb   Stairs? No   Balance   Posture Fair   Sitting - Static Good   Sitting - Dynamic Good   Standing - Static Good;-   Standing - Dynamic Fair;+   Exercises   Hip Flexion 2x10   Hip Abduction 2x10   Knee Long Arc Quad 2x10   Ankle Pumps 2x10   Patient Goals    Patient goals  Pt goal is to keep her strength up and go home   Short term goals   Time Frame for Short term goals 12 tx's   Short term goal 1 Bed mobiity independent    Short term goal 2 Pt transfer independently   Short term goal 3 Pt amb 100 ft with rw nd Patsy    Short term goal 4 Pt particpate in 30  minutes ther ex to increase strength for function   Conditions Requiring Skilled Therapeutic Intervention   Body structures, Functions, Activity limitations Decreased functional mobility ; Decreased strength;Decreased safe awareness;Decreased endurance;Decreased balance;Decreased coordination   Assessment Pt improving tolerance for activity, possibly benefit from inpt rehab if continues to increase participation   Prognosis Good   REQUIRES PT FOLLOW UP Yes   Discharge Recommendations IP Rehab   Activity Tolerance   Activity Tolerance Patient limited by endurance   Plan   Times per week 1-2x/day. 5-6x/week.     Current Treatment Recommendations Strengthening;ROM;Safety Education & Training;Positioning;Patient/Caregiver Education & Training;Home Exercise Program   Safety Devices   Type of devices Left in chair;Gait belt;Call light within reach   Restraints   Initially in place No   Progress Note   See Progress Note Yes   PT Whiteboard Notes   Therapy Whiteboard 9/4/18 AM, inpt rehab,2/12     SpO2 88% s/p gait, 2 minutes rest 97%         09:34-10:14

## 2018-09-04 NOTE — PLAN OF CARE
[facility]    Second Visit Note  For more detailed information please refer to the progress note of the day      9/4/2018    3:08 PM    Name:   Leora Robles  MRN:     [id number]     Acct:      [de-identified]   Room:   [facility]1003-02   Day:  15  Admit Date:  8/20/2018  1:21 AM    PCP:   Lolis Marte DO  Code Status:  Full Code        Pt vitals were reviewed   New labs were reviewed   Patient was seen    Updated plan :     1.  Family updated on care plan        Mee Pacheco DO  9/4/2018  3:08 PM

## 2018-09-04 NOTE — PLAN OF CARE
Problem: Risk for Impaired Skin Integrity  Goal: Tissue integrity - skin and mucous membranes  Structural intactness and normal physiological function of skin and  mucous membranes.    Outcome: Ongoing      Problem: Falls - Risk of:  Goal: Absence of physical injury  Absence of physical injury   Outcome: Ongoing      Problem: Pain:  Goal: Control of acute pain  Control of acute pain   Outcome: Ongoing

## 2018-09-04 NOTE — PLAN OF CARE
Problem: Falls - Risk of:  Goal: Will remain free from falls  Will remain free from falls   Outcome: Ongoing  Fall risk assessment completed. Patient instructed to use call light. Bed locked and in lowest position, side rails up 2/4, call light and bedside table within reach, clutter removed, and non-skid footwear on when pt out of bed. Hourly rounds will continue. Pt fall risk, fall band present, falling star, safety alarm activated and in use as needed. Hourly rounding performed. Pt encouraged to use call light. See Mary Sheppard fall risk assessment.

## 2018-09-04 NOTE — PROGRESS NOTES
Pulmonary Critical Care Progress Note       Patient seen for the follow up of Acute on chronic respiratory failure, pulmonary edema/pleural effusions, diastolic heart failure, exacerbation COPD    Subjective:    She has improved shortness of breath. She has  occasional cough with sputum production. She denies chest pain. She ambulates to chair . She has poor appetite    Examination:  Vitals: BP (!) 116/41   Pulse 68   Temp 97.5 °F (36.4 °C) (Oral)   Resp 20   Ht 5' 1\" (1.549 m)   Wt 194 lb 8 oz (88.2 kg)   LMP 04/03/2004 (Within Years)   SpO2 100%   BMI 36.75 kg/m²     General appearance: alert and cooperative with exam  Neck: No JVD  Lungs: Decreased air entry no  wheezing  Heart: Irregular heart rhythm, controlled rate  Abdomen: Soft, non tender, + BS  Extremities: no cyanosis or clubbing. + edema    LABs:  CBC:   Recent Labs      09/02/18   0646  09/04/18   0535   WBC  12.4*   --    HGB  8.3*   --    HCT  25.9*   --    PLT  295  295     BMP:   Recent Labs      09/03/18   0548  09/04/18   0535   NA  141  142   K  3.6*  3.5*   CO2  40*  40*   BUN  39*  40*   CREATININE  1.91*  1.94*   LABGLOM  26*  25*   GLUCOSE  156*  203*       Radiology      CXR 9/1  Improving multifocal airspace disease bilaterally and bilateral effusions. Impression:  · Acute on chronic respiratory failure  · Pulmonary edema/bilateral pleural effusion/diastolic heart failure  · Acute exacerbation of COPD  · 30-pack-year smoking  · Dysphagia/aspiration,?   Aspiration pneumonitis versus pneumonia  · Obstructive sleep apnea/obesity  · KRISTIN on Chronic kidney disease  · A. fib with RVR  · HDL/HTN/DM    Recommendations:  · O2 2 liters NC  · Incentive spirometry every hour WA  · Continue IV antibiotics, Levaquin/clindamycin  · Albuterol and Ipratropium Q 4 hours and prn  · Dulera 200  · Repeat CXR  · Diuresis/Lasix 20 IV Q 12hr/Nephrology following   · Clinda and Levaquin  · make Prednisone 20 mg PO QD   · Continue dysphagia diet  · PTOT  · Follow-up CT scan of the chest in 6-8 weeks  · DVT prophylaxis with low molecular weight heparin          Svetlana Sparks MD on 9/4/2018 at 4:44 PM  Pulmonary Critical Care and Sleep Medicine,  [facility] [place]: [phone]

## 2018-09-04 NOTE — PROGRESS NOTES
[facility]    Progress Note    9/4/2018    9:00 AM    Name:   Cinda Tillman  MRN:     [id number]     Acct:      [de-identified]   Room:   [address] [place] [address] Day:  13  Admit Date:  8/20/2018  1:21 AM    PCP:   Arlin Schafer DO  Code Status:  Full Code    Subjective:     C/C:   Chief Complaint   Patient presents with    Emesis    Nausea     Interval History Status: not changed. Patient sitting up in a chair without acute complaints. Denies any chest pain, nausea or vomiting, fevers or chills. Shortness of breath is at baseline    Brief History:      This is 82-year-old white female who is noted with a complaint of nausea, vomiting and diarrhea and found have acute kidney injury and hypotension.  She is admitted into the intensive care unit and placed on IV fluids and Levophed drip.  She has recently been on Bactrim as an outpatient for urinary tract infection.     On the evening of August 22, 2018 she developed shortness of breath and respiratory distress requiring BiPAP placement.  Chest x-ray August 23 showing pulmonary edema.  IV fluids were discontinued     On the evening of August 23 round 6:30 PM she developed rapid atrial fibrillation and was placed on Cardizem drip as well as heparin drip. cardiac enzymes are negative.  Throughout the night she converted to sinus rhythm and Cardizem drip was weaned off.  She's had recurrent episodes of atrial fibrillation with rapid response and cardiology has increased her amiodarone to 200 mg twice a day    Review of Systems:     Constitutional:  negative for chills, fevers, sweats  Respiratory:  negative for cough, dyspnea on exertion, wheezing  Cardiovascular:  negative for chest pain, chest pressure/discomfort, lower extremity edema, palpitations  Gastrointestinal:  negative for abdominal pain, constipation, diarrhea, nausea, vomiting  Neurological:  negative for dizziness, headache    Medications: Allergies: Allergies   Allergen Reactions    Dye [Barium-Containing Compounds] Other (See Comments)     Cause Afib per     Pcn [Penicillins] Itching and Swelling    Bactrim [Sulfamethoxazole-Trimethoprim] Other (See Comments)     Allergic Nephritis    Red Dye Itching and Rash     This allergy is likely incorrect:  Per patient's  she has no issue with medications that have red dye in them or red food.  He thinks this reaction was added to chart when the pt had a colonoscopy (possibly barium or iodine dye instead)       Current Meds:   Scheduled Meds:    insulin lispro  0-18 Units Subcutaneous TID WC    insulin lispro  0-9 Units Subcutaneous Nightly    clindamycin  300 mg Oral 3 times per day    vitamin D  2,000 Units Oral Daily    predniSONE  30 mg Oral Daily    levofloxacin  250 mg Oral Daily    fluconazole  200 mg Oral Daily    diltiazem  120 mg Oral Daily    furosemide  40 mg Intravenous Q12H    magnesium oxide  400 mg Oral BID    polyethylene glycol  17 g Oral Daily    pantoprazole  40 mg Oral BID AC    metoprolol succinate  25 mg Oral Daily    ipratropium-albuterol  1 ampule Inhalation TID    calcitRIOL  0.25 mcg Oral Daily    amiodarone  200 mg Oral BID    aspirin  81 mg Oral Daily    atorvastatin  20 mg Oral Nightly    mometasone-formoterol  2 puff Inhalation BID    carbidopa-levodopa  1 tablet Oral 4x Daily    conjugated estrogens  0.5 g Vaginal Every Other Day    ferrous sulfate  325 mg Oral BID WC    rasagiline mesylate  1 tablet Oral Daily    rOPINIRole  2 mg Oral TID    senna  2 tablet Oral Daily    sodium chloride flush  10 mL Intravenous 2 times per day    linagliptin  5 mg Oral Daily    sodium chloride flush  10 mL Intravenous 2 times per day    miconazole   Topical BID     Continuous Infusions:    dextrose       PRN Meds: melatonin, LORazepam, morphine, diatrizoate meglumine-sodium, levalbuterol, metoprolol, acetaminophen, polyethylene glycol, 09/04/18   0802   POCGLU  182*  307*  269*  155*       I/O (24Hr):     Intake/Output Summary (Last 24 hours) at 09/04/18 0900  Last data filed at 09/04/18 0755   Gross per 24 hour   Intake                0 ml   Output              402 ml   Net             -402 ml       Labs:    Hematology:  Recent Labs      09/02/18   0646  09/04/18   0535   WBC  12.4*   --    RBC  2.50*   --    HGB  8.3*   --    HCT  25.9*   --    MCV  103.5*   --    MCH  33.1   --    MCHC  32.0   --    RDW  15.8*   --    PLT  295  295   MPV  7.8   --      Chemistry:  Recent Labs      09/02/18   0646  09/03/18   0548  09/04/18   0535   NA  139  141  142   K  4.0  3.6*  3.5*   CL  88*  90*  89*   CO2  37*  40*  40*   GLUCOSE  253*  156*  203*   BUN  40*  39*  40*   CREATININE  1.86*  1.91*  1.94*   MG  1.6  1.7  1.6   ANIONGAP  14  11  13   LABGLOM  27*  26*  25*   GFRAA  32*  31*  31*   CALCIUM  7.2*  7.1*  7.0*   PHOS  4.1   --    --      Recent Labs      09/02/18   2046  09/03/18   0747  09/03/18   1105  09/03/18   1657  09/03/18   2023  09/04/18   0802   POCGLU  260*  154*  182*  307*  269*  155*         Lab Results   Component Value Date/Time    SPECIAL LT HAND 7ML 08/20/2018 03:45 AM     Lab Results   Component Value Date/Time    CULTURE NO GROWTH 6 DAYS 08/20/2018 03:45 AM       Lab Results   Component Value Date    POCPH 7.36 06/02/2013    POCPCO2 55 06/02/2013    POCPO2 63 06/02/2013    POCHCO3 31.0 06/02/2013    NBEA NOT REPORTED 06/02/2013    PBEA 6 06/02/2013    LGY0BLG 33 06/02/2013    GLIO9EYL 90 06/02/2013    FIO2 45.0 01/15/2017       Radiology:    No new radiology reports    Physical Examination:        General appearance:  alert, cooperative and no distress  Mental Status:  oriented to person, place and time and normal affect  Lungs:  Distant breath sounds with few rhonchi, normal effort  Heart:  regular rate and rhythm, no murmur  Abdomen:  soft, nontender, nondistended, normal bowel sounds, no masses, hepatomegaly, splenomegaly  Extremities:  no edema, redness, tenderness in the calves  Skin:  no gross lesions, rashes, induration    Assessment:        Primary Problem  Acute kidney injury superimposed on chronic kidney disease [facility])    Active Hospital Problems    Diagnosis Date Noted    Candida esophagitis ([facility] [B37.81] 08/31/2018    Odynophagia [R13.10] 08/28/2018    Esophageal dysphagia [R13.10]     Hypomagnesemia [E83.42] 08/27/2018    Thrombocytopenia ([address] [D69.6] 08/25/2018    Hypovolemic shock ([address] [R57.1] 08/24/2018    Acute hypoxemic respiratory failure (HCC) [J96.01] 08/23/2018    Rapid atrial fibrillation (HCC) [I48.91] 08/23/2018    SIRS (systemic inflammatory response syndrome) (HCC) [R65.10] 08/20/2018    Nausea and vomiting in adult [R11.2] 08/20/2018    Acute kidney injury superimposed on chronic kidney disease ([address] [N17.9, N18.9] 08/20/2018    Bacterial UTI [N39.0, A49.9] 08/20/2018    Dehydration [E86.0]     Anemia of chronic renal failure, stage 4 (severe) ([address] [N18.4, D63.1] 04/25/2018    CKD stage 3 due to type 2 diabetes mellitus ([address] [E11.22, N18.3] 03/08/2018    ECTOR on CPAP [G47.33, Z99.89]     Chronic atrial fibrillation ([facility] [I48.2] 05/31/2013    Essential hypertension [I10] 06/07/2012    Controlled type 2 diabetes mellitus with stage 3 chronic kidney disease, without long-term current use of insulin ([address] [E11.22, N18.3]        Plan:        1. Continue present medications  2. Awaiting [id number]-lmiv for LTAC  3. GI and DVT prophylaxis  4. Insulin scale for glycemic control  5. Monitor and control blood pressure  6. Follow labs, supplement electrolytes as needed  7. Aerosols and oxygen as ordered  8.  Discharge planning pending [id number]      Jeannette Lynn DO  9/4/2018  9:00 AM

## 2018-09-04 NOTE — PROGRESS NOTES
to/from bathroom and to/from kitchen during day as needed)  Transfer Assistance: Independent  Active : No  Additional Comments: home health aide 4 days a week; pt has 24 hour assist       Objective   Vision: Impaired  Vision Exceptions: Wears glasses at all times  Hearing: Within functional limits    Orientation  Overall Orientation Status: Within Functional Limits  Observation/Palpation  Posture: Fair (forward flexed, cues to stand upright at walker)  Observation: O2 sats 95% following standing at sink for ~5 min for oral care/grooming  Balance  Sitting Balance: Stand by assistance  Standing Balance: Contact guard assistance  Standing Balance  Sit to stand: Contact guard assistance  Stand to sit: Contact guard assistance  Functional Mobility  Functional - Mobility Device: Rolling Walker  Assist Level: Contact guard assistance  Functional Mobility Comments: in room for toileting at [facility] and to/from sink for grooming tasks. Pt appears SOB, but O2 levels at 95%/ Pt does fatigue easily however and requires cues to take break and to sit back down. Toilet Transfers  Equipment Used: Standard bedside commode  Toilet Transfer: Contact guard assistance  ADL  Feeding: Independent  Grooming: Contact guard assistance (to stand at sink )  UE Bathing: Minimal assistance  LE Bathing: Moderate assistance  UE Dressing: Minimal assistance  LE Dressing: Moderate assistance (for socks)  Toileting:  Moderate assistance (for brief management)  Tone RUE  RUE Tone: Normotonic  Tone LUE  LUE Tone: Normotonic     Bed mobility  Comment: up in chair  Transfers  Sit to stand: Contact guard assistance  Stand to sit: Contact guard assistance     Cognition  Overall Cognitive Status: Exceptions  Safety Judgement: Decreased awareness of need for safety (at times, pt stands up from [facility] with walker 2 feet away; cues to assure safety/fall prevention techs in place before beginning to walk)  Problem Solving: Assistance required to generate solutions;Assistance required to implement solutions;Decreased awareness of errors;Assistance required to correct errors made  Insights: Decreased awareness of deficits  Perception  Overall Perceptual Status: WFL     Sensation  Overall Sensation Status: WFL        LUE AROM (degrees)  LUE AROM : WFL  RUE AROM (degrees)  RUE AROM : WFL  LUE Strength  Gross LUE Strength: WFL (B UE's 4-/5)  RUE Strength  Gross RUE Strength: WFL                  Assessment   Performance deficits / Impairments: Decreased functional mobility ; Decreased ADL status; Decreased strength;Decreased safe awareness;Decreased endurance;Decreased balance  Prognosis: Good  Decision Making: Medium Complexity  Patient Education: OT POC, discharge recommendations, safety with mob and transfers, pacing  REQUIRES OT FOLLOW UP: Yes  Activity Tolerance  Activity Tolerance: Patient Tolerated treatment well  Safety Devices  Safety Devices in place: Yes  Type of devices: Call light within reach;Nurse notified; Left in chair;Chair alarm in place;Gait belt;Patient at risk for falls         Plan   Plan  Times per week: 4-5x/week, 1-2x/day  Current Treatment Recommendations: Strengthening, Functional Mobility Training, Balance Training, Endurance Training, Safety Education & Training, Self-Care / ADL, Patient/Caregiver Education & Training, Equipment Evaluation, Education, & procurement    G-Code  OT G-codes  Functional Assessment Tool Used: [facility]  Score: 15  Functional Limitation: Self care  Self Care Current Status (): At least 40 percent but less than 60 percent impaired, limited or restricted  Self Care Goal Status ():  At least 20 percent but less than 40 percent impaired, limited or restricted  OutComes Score                                           AM-PAC Score        AM-PAC Inpatient Daily Activity Raw Score: 15  AM-PAC Inpatient ADL T-Scale Score : 34.69  ADL Inpatient CMS 0-100% Score: 56.46  ADL Inpatient CMS G-Code Modifier : CK    Goals  Short

## 2018-09-04 NOTE — CARE COORDINATION
Social Work-Met with pt and  to discuss dc options. Discussed  rehab vs SNF. Discussed acute rehab vs SNF. They prefer acute rehab. Discussed preference of rehab hospitals. They prefer [facility].  Sent referral.Justen MUNGUIA

## 2018-09-05 ENCOUNTER — APPOINTMENT (OUTPATIENT)
Dept: GENERAL RADIOLOGY | Age: 73
DRG: 871 | End: 2018-09-05
Payer: COMMERCIAL

## 2018-09-05 LAB
ANION GAP SERPL CALCULATED.3IONS-SCNC: 10 MMOL/L (ref 9–17)
BUN BLDV-MCNC: 42 MG/DL (ref 8–23)
BUN/CREAT BLD: 20 (ref 9–20)
CALCIUM IONIZED: 0.88 MMOL/L (ref 1.13–1.33)
CALCIUM SERPL-MCNC: 7.1 MG/DL (ref 8.6–10.4)
CHLORIDE BLD-SCNC: 90 MMOL/L (ref 98–107)
CO2: 39 MMOL/L (ref 20–31)
CREAT SERPL-MCNC: 2.09 MG/DL (ref 0.5–0.9)
GFR AFRICAN AMERICAN: 28 ML/MIN
GFR NON-AFRICAN AMERICAN: 23 ML/MIN
GFR SERPL CREATININE-BSD FRML MDRD: ABNORMAL ML/MIN/{1.73_M2}
GFR SERPL CREATININE-BSD FRML MDRD: ABNORMAL ML/MIN/{1.73_M2}
GLUCOSE BLD-MCNC: 171 MG/DL (ref 65–105)
GLUCOSE BLD-MCNC: 194 MG/DL (ref 70–99)
GLUCOSE BLD-MCNC: 227 MG/DL (ref 65–105)
GLUCOSE BLD-MCNC: 357 MG/DL (ref 65–105)
GLUCOSE BLD-MCNC: 358 MG/DL (ref 65–105)
GLUCOSE BLD-MCNC: 409 MG/DL (ref 65–105)
MAGNESIUM: 1.6 MG/DL (ref 1.6–2.6)
POTASSIUM SERPL-SCNC: 3.8 MMOL/L (ref 3.7–5.3)
SODIUM BLD-SCNC: 139 MMOL/L (ref 135–144)

## 2018-09-05 PROCEDURE — 6370000000 HC RX 637 (ALT 250 FOR IP): Performed by: INTERNAL MEDICINE

## 2018-09-05 PROCEDURE — 6360000002 HC RX W HCPCS: Performed by: INTERNAL MEDICINE

## 2018-09-05 PROCEDURE — 97110 THERAPEUTIC EXERCISES: CPT

## 2018-09-05 PROCEDURE — 97116 GAIT TRAINING THERAPY: CPT

## 2018-09-05 PROCEDURE — 2700000000 HC OXYGEN THERAPY PER DAY

## 2018-09-05 PROCEDURE — 97535 SELF CARE MNGMENT TRAINING: CPT

## 2018-09-05 PROCEDURE — 82330 ASSAY OF CALCIUM: CPT

## 2018-09-05 PROCEDURE — 2580000003 HC RX 258: Performed by: RADIOLOGY

## 2018-09-05 PROCEDURE — 71045 X-RAY EXAM CHEST 1 VIEW: CPT

## 2018-09-05 PROCEDURE — 6360000002 HC RX W HCPCS: Performed by: NURSE PRACTITIONER

## 2018-09-05 PROCEDURE — 82947 ASSAY GLUCOSE BLOOD QUANT: CPT

## 2018-09-05 PROCEDURE — 83735 ASSAY OF MAGNESIUM: CPT

## 2018-09-05 PROCEDURE — 6370000000 HC RX 637 (ALT 250 FOR IP): Performed by: NURSE PRACTITIONER

## 2018-09-05 PROCEDURE — 99232 SBSQ HOSP IP/OBS MODERATE 35: CPT | Performed by: INTERNAL MEDICINE

## 2018-09-05 PROCEDURE — 80048 BASIC METABOLIC PNL TOTAL CA: CPT

## 2018-09-05 PROCEDURE — 2060000000 HC ICU INTERMEDIATE R&B

## 2018-09-05 PROCEDURE — 94640 AIRWAY INHALATION TREATMENT: CPT

## 2018-09-05 PROCEDURE — 36415 COLL VENOUS BLD VENIPUNCTURE: CPT

## 2018-09-05 PROCEDURE — 2580000003 HC RX 258: Performed by: NURSE PRACTITIONER

## 2018-09-05 RX ORDER — MAGNESIUM SULFATE 1 G/100ML
1 INJECTION INTRAVENOUS
Status: COMPLETED | OUTPATIENT
Start: 2018-09-05 | End: 2018-09-05

## 2018-09-05 RX ORDER — FUROSEMIDE 40 MG/1
40 TABLET ORAL 2 TIMES DAILY
Status: DISCONTINUED | OUTPATIENT
Start: 2018-09-05 | End: 2018-09-07 | Stop reason: HOSPADM

## 2018-09-05 RX ADMIN — VITAMIN D, TAB 1000IU (100/BT) 2000 UNITS: 25 TAB at 08:37

## 2018-09-05 RX ADMIN — INSULIN LISPRO 9 UNITS: 100 INJECTION, SOLUTION INTRAVENOUS; SUBCUTANEOUS at 20:40

## 2018-09-05 RX ADMIN — METOPROLOL SUCCINATE 25 MG: 25 TABLET, FILM COATED, EXTENDED RELEASE ORAL at 08:36

## 2018-09-05 RX ADMIN — FLUCONAZOLE 200 MG: 100 TABLET ORAL at 08:36

## 2018-09-05 RX ADMIN — ROPINIROLE HYDROCHLORIDE 2 MG: 2 TABLET, FILM COATED ORAL at 08:36

## 2018-09-05 RX ADMIN — MAGNESIUM OXIDE TAB 400 MG (241.3 MG ELEMENTAL MG) 400 MG: 400 (241.3 MG) TAB at 20:39

## 2018-09-05 RX ADMIN — INSULIN LISPRO 3 UNITS: 100 INJECTION, SOLUTION INTRAVENOUS; SUBCUTANEOUS at 08:36

## 2018-09-05 RX ADMIN — ACETAMINOPHEN 650 MG: 325 TABLET ORAL at 08:45

## 2018-09-05 RX ADMIN — MICONAZOLE NITRATE: 20.6 POWDER TOPICAL at 08:46

## 2018-09-05 RX ADMIN — AMIODARONE HYDROCHLORIDE 200 MG: 200 TABLET ORAL at 08:37

## 2018-09-05 RX ADMIN — ROPINIROLE HYDROCHLORIDE 2 MG: 2 TABLET, FILM COATED ORAL at 20:39

## 2018-09-05 RX ADMIN — RASAGILINE 1 MG: 1 TABLET ORAL at 08:46

## 2018-09-05 RX ADMIN — MAGNESIUM SULFATE HEPTAHYDRATE 1 G: 1 INJECTION, SOLUTION INTRAVENOUS at 19:26

## 2018-09-05 RX ADMIN — MAGNESIUM SULFATE HEPTAHYDRATE 1 G: 1 INJECTION, SOLUTION INTRAVENOUS at 18:27

## 2018-09-05 RX ADMIN — PANTOPRAZOLE SODIUM 40 MG: 40 TABLET, DELAYED RELEASE ORAL at 15:42

## 2018-09-05 RX ADMIN — SENNOSIDES 17.2 MG: 8.6 TABLET, FILM COATED ORAL at 08:44

## 2018-09-05 RX ADMIN — MICONAZOLE NITRATE: 20.6 POWDER TOPICAL at 20:40

## 2018-09-05 RX ADMIN — PREDNISONE 20 MG: 20 TABLET ORAL at 08:37

## 2018-09-05 RX ADMIN — FUROSEMIDE 40 MG: 10 INJECTION, SOLUTION INTRAMUSCULAR; INTRAVENOUS at 08:36

## 2018-09-05 RX ADMIN — IPRATROPIUM BROMIDE AND ALBUTEROL SULFATE 1 AMPULE: .5; 3 SOLUTION RESPIRATORY (INHALATION) at 09:33

## 2018-09-05 RX ADMIN — CARBIDOPA AND LEVODOPA 1 TABLET: 25; 100 TABLET, EXTENDED RELEASE ORAL at 08:37

## 2018-09-05 RX ADMIN — IPRATROPIUM BROMIDE AND ALBUTEROL SULFATE 1 AMPULE: .5; 3 SOLUTION RESPIRATORY (INHALATION) at 20:43

## 2018-09-05 RX ADMIN — ASPIRIN 81 MG: 81 TABLET, COATED ORAL at 08:37

## 2018-09-05 RX ADMIN — ROPINIROLE HYDROCHLORIDE 2 MG: 2 TABLET, FILM COATED ORAL at 13:04

## 2018-09-05 RX ADMIN — INSULIN LISPRO 15 UNITS: 100 INJECTION, SOLUTION INTRAVENOUS; SUBCUTANEOUS at 18:01

## 2018-09-05 RX ADMIN — FERROUS SULFATE TAB EC 325 MG (65 MG FE EQUIVALENT) 325 MG: 325 (65 FE) TABLET DELAYED RESPONSE at 17:28

## 2018-09-05 RX ADMIN — DILTIAZEM HYDROCHLORIDE 120 MG: 120 CAPSULE, COATED, EXTENDED RELEASE ORAL at 08:36

## 2018-09-05 RX ADMIN — Medication 10 ML: at 08:40

## 2018-09-05 RX ADMIN — FERROUS SULFATE TAB EC 325 MG (65 MG FE EQUIVALENT) 325 MG: 325 (65 FE) TABLET DELAYED RESPONSE at 08:37

## 2018-09-05 RX ADMIN — ATORVASTATIN CALCIUM 20 MG: 20 TABLET, FILM COATED ORAL at 20:39

## 2018-09-05 RX ADMIN — Medication 10 ML: at 21:02

## 2018-09-05 RX ADMIN — INSULIN LISPRO 6 UNITS: 100 INJECTION, SOLUTION INTRAVENOUS; SUBCUTANEOUS at 13:03

## 2018-09-05 RX ADMIN — ACETAMINOPHEN 650 MG: 325 TABLET ORAL at 17:28

## 2018-09-05 RX ADMIN — FUROSEMIDE 40 MG: 40 TABLET ORAL at 17:28

## 2018-09-05 RX ADMIN — CALCITRIOL 0.25 MCG: 0.25 CAPSULE ORAL at 08:36

## 2018-09-05 RX ADMIN — MAGNESIUM OXIDE TAB 400 MG (241.3 MG ELEMENTAL MG) 400 MG: 400 (241.3 MG) TAB at 13:04

## 2018-09-05 RX ADMIN — IPRATROPIUM BROMIDE AND ALBUTEROL SULFATE 1 AMPULE: .5; 3 SOLUTION RESPIRATORY (INHALATION) at 14:28

## 2018-09-05 RX ADMIN — PANTOPRAZOLE SODIUM 40 MG: 40 TABLET, DELAYED RELEASE ORAL at 07:09

## 2018-09-05 RX ADMIN — AMIODARONE HYDROCHLORIDE 200 MG: 200 TABLET ORAL at 20:40

## 2018-09-05 RX ADMIN — CARBIDOPA AND LEVODOPA 1 TABLET: 25; 100 TABLET, EXTENDED RELEASE ORAL at 20:39

## 2018-09-05 RX ADMIN — CARBIDOPA AND LEVODOPA 1 TABLET: 25; 100 TABLET, EXTENDED RELEASE ORAL at 13:04

## 2018-09-05 RX ADMIN — LINAGLIPTIN 5 MG: 5 TABLET, FILM COATED ORAL at 08:36

## 2018-09-05 RX ADMIN — LORAZEPAM 1 MG: 2 INJECTION, SOLUTION INTRAMUSCULAR; INTRAVENOUS at 23:38

## 2018-09-05 RX ADMIN — CARBIDOPA AND LEVODOPA 1 TABLET: 25; 100 TABLET, EXTENDED RELEASE ORAL at 17:28

## 2018-09-05 ASSESSMENT — PAIN SCALES - GENERAL
PAINLEVEL_OUTOF10: 3
PAINLEVEL_OUTOF10: 0
PAINLEVEL_OUTOF10: 3
PAINLEVEL_OUTOF10: 10
PAINLEVEL_OUTOF10: 0
PAINLEVEL_OUTOF10: 0
PAINLEVEL_OUTOF10: 3
PAINLEVEL_OUTOF10: 0

## 2018-09-05 ASSESSMENT — PAIN DESCRIPTION - FREQUENCY
FREQUENCY: INTERMITTENT
FREQUENCY: CONTINUOUS

## 2018-09-05 ASSESSMENT — PAIN DESCRIPTION - DESCRIPTORS
DESCRIPTORS: ACHING
DESCRIPTORS: ACHING

## 2018-09-05 ASSESSMENT — PAIN DESCRIPTION - PAIN TYPE
TYPE: CHRONIC PAIN

## 2018-09-05 ASSESSMENT — PAIN DESCRIPTION - LOCATION
LOCATION: GENERALIZED

## 2018-09-05 ASSESSMENT — PAIN DESCRIPTION - ORIENTATION
ORIENTATION: ANTERIOR;POSTERIOR
ORIENTATION: ANTERIOR;POSTERIOR

## 2018-09-05 NOTE — PROGRESS NOTES
Physical Therapy  Facility/Department: [facility] PROGRESSIVE CARE  Daily Treatment Note  NAME: Javy Lara  : 1945  MRN: [id number]    Date of Service: 2018    Discharge Recommendations:  IP Rehab        Patient Diagnosis(es): The primary encounter diagnosis was Acute renal failure, unspecified acute renal failure type ([address] Diagnoses of Lactic acidemia, Nausea, Chronic anemia, Hypoglycemia, Dehydration, History of drug resistance, SIRS (systemic inflammatory response syndrome) ([address] Acute cystitis with hematuria, and History of cellulitis were also pertinent to this visit. has a past medical history of Acute on chronic diastolic congestive heart failure ([address] Anesthesia complication; Asthma; Atrial fibrillation ([address] Cataracts, bilateral; Cellulitis; Cerebral artery occlusion with cerebral infarction ([address] Chronic kidney disease; Constipation; COPD (chronic obstructive pulmonary disease) ([address] Difficult intravenous access; Diverticulosis; DM2 (diabetes mellitus, type 2) ([address] Full dentures; GERD (gastroesophageal reflux disease); Headache(784.0); [place] (hard of hearing); Hyperlipidemia; Hyperplastic polyp of intestine; Hypertension; Impaired ambulation; Kidney stone; Morbid obesity with BMI of 40.0-44.9, adult ([address] ECTOR on CPAP; Osteoarthritis; Parkinson disease ([address] Restless leg syndrome; Spinal stenosis in cervical region; Type II or unspecified type diabetes mellitus without mention of complication, not stated as uncontrolled; Unspecified sleep apnea; Urethral caruncle; and Wears glasses. has a past surgical history that includes Cervical disc surgery (); Appendectomy; Tonsillectomy and adenoidectomy (); hernia repair (); eye surgery (Bilateral, 2017); Cervical spine surgery (13); laminectomy (2013); Upper gastrointestinal endoscopy (2016); Colonoscopy (); Colonoscopy (2016); Colonoscopy (2016);  Colonoscopy (2017); pr colsc flx w/removal

## 2018-09-05 NOTE — PROGRESS NOTES
Pulmonary Critical Care Progress Note  Yessi Cadet CNP / Dr. Danny Gilbert M.D. Patient seen for the follow up of Acute on chronic respiratory failure, pulmonary edema/pleural effusions, diastolic heart failure, exacerbation COPD     Subjective:  She is feeling fair today, her shortness of breath improves. She has occasional cough. She denies chest pain. Examination:  Vitals: BP (!) 113/38   Pulse 70   Temp 98.1 °F (36.7 °C) (Oral)   Resp 18   Ht 5' 1\" (1.549 m)   Wt 177 lb 9.6 oz (80.6 kg)   LMP 04/03/2004 (Within Years)   SpO2 99%   BMI 33.56 kg/m²     General appearance: alert and cooperative with exam, sitting up in chair with her  in the room  Neck: No JVD  Lungs: Moderate air exchange, rales, no wheeze  Heart: regular rate and rhythm, S1, S2 normal, no gallop  Abdomen: Soft, non tender, + BS  Extremities: no cyanosis or clubbing.  No significant edema    LABs:  CBC:   Recent Labs      09/04/18   0535   PLT  295     BMP:   Recent Labs      09/04/18   0535  09/05/18   0519   NA  142  139   K  3.5*  3.8   CO2  40*  39*   BUN  40*  42*   CREATININE  1.94*  2.09*   LABGLOM  25*  23*   GLUCOSE  203*  194*     Radiology:  9/5/18      Impression:  · Acute on chronic respiratory failure  · Pulmonary edema/bilateral pleural effusion/diastolic heart failure  · Acute exacerbation of COPD  · 30-pack-year smoking  · Dysphagia/aspiration,?  Aspiration pneumonitis versus pneumonia  · Obstructive sleep apnea/obesity  · KRISTIN on Chronic kidney disease  · A. fib with RVR  · HDL/HTN/DM     Recommendations:  · O2 2 liters NC  · Off antibiotics (Levaquin and Clinda), still on Diflucan  · Albuterol and Ipratropium Q 4 hours and prn  · Dulera 200  · Diuresis with Nephrology following   · Prednisone taper  · Continue dysphagia diet  · PT/OT  · Follow-up CT scan of the chest in 6-8 weeks  · DVT prophylaxis with low molecular weight heparin  · Incentive spirometry every hour while awake  · Discharge planning to

## 2018-09-05 NOTE — PROGRESS NOTES
lesion by hot bx forceps (N/A, 4/25/2017); Cystorrhaphy (04/04/2018); pr cystourethroscopy,biopsies (N/A, 4/4/2018); bronchoscopy (06/05/2018); pr [facility] incl fluor gdnce dx w/cell washg spx (N/A, 6/5/2018); Upper gastrointestinal endoscopy (08/29/2018); and Upper gastrointestinal endoscopy (N/A, 8/29/2018). Restrictions  Restrictions/Precautions  Restrictions/Precautions: General Precautions, Fall Risk, Contact Precautions  Required Braces or Orthoses?: No  Position Activity Restriction  Other position/activity restrictions: Up with assist  Subjective   General  Chart Reviewed: Yes  Response To Previous Treatment: Patient with no complaints from previous session. Family / Caregiver Present: Yes  Subjective  Subjective: Pt fatigued from just finishing with OT  General Comment  Comments: RNJarvis states pt is medically stable for PT  Pain Screening  Patient Currently in Pain: Denies  Pain Assessment  Pain Assessment: 0-10  Pain Level: 3  Pain Type: Chronic pain  Pain Location: Generalized  Vital Signs  Patient Currently in Pain: Denies  Patient Observation  Observations: O2   Pre Treatment Pain Screening  Intervention List: Patient able to continue with treatment    Orientation  Orientation  Overall Orientation Status: Within Normal Limits  Objective      Transfers  Sit to Stand: Contact guard assistance  Stand to sit: Contact guard assistance  Bed to Chair: Contact guard assistance  Stand Pivot Transfers: Contact guard assistance  Ambulation  Ambulation?: Yes  Ambulation 1  Surface: level tile  Device: Rolling Walker  Other Apparatus: O2  Assistance: Contact guard assistance  Quality of Gait: Diminished step length  Distance: 20' x 2  Comments: Pt able to amb from bedside chair to hallway and back without seated rest break. Pt required one short standing rest break.    Stairs/Curb  Stairs?: No     Balance  Posture: Fair  Sitting - Static: Good  Sitting - Dynamic: Good  Standing - Static: Good;-  Standing - Dynamic: Fair;+  Comments: standing balance assessed with RW. Exercises  Comments: Seated LE ex x 20  Other exercises  Other exercises?: Yes  Other exercises 1: Sit to stand x 5 with CGA                        Assessment   Body structures, Functions, Activity limitations: Decreased functional mobility ; Decreased strength;Decreased safe awareness;Decreased endurance;Decreased balance;Decreased coordination  Assessment: Pt very motivated to get better and increase mobility. Pt would be able to tolerate 3+ hours of inpatient PT. Pt required skilled inpatient PT to increase strength, endurance, mobility, and independence. Specific instructions for Next Treatment: progress gait/balance  Prognosis: Good  Decision Making: Medium Complexity  REQUIRES PT FOLLOW UP: Yes  Activity Tolerance  Activity Tolerance: Patient limited by endurance  Activity Tolerance: Pt motivated     G-Code     OutComes Score                                                    AM-PAC Score  AM-PAC Inpatient Mobility Raw Score : 15  AM-PAC Inpatient T-Scale Score : 39.45  Mobility Inpatient CMS 0-100% Score: 57.7  Mobility Inpatient CMS G-Code Modifier : CK          Goals  Short term goals  Time Frame for Short term goals: 12 tx's  Short term goal 1: Bed mobiity independent   Short term goal 2: Pt transfer independently  Short term goal 3: Pt amb 100 ft with rw nd Patsy   Short term goal 4: Pt particpate in 30  minutes ther ex to increase strength for function  Patient Goals   Patient goals : Pt goal is to keep her strength up and go home    Plan    Plan  Times per week: 1-2x/day. 5-6x/week.    Specific instructions for Next Treatment: progress gait/balance  Current Treatment Recommendations: Strengthening, ROM, Safety Education & Training, Positioning, Patient/Caregiver Education & Training, Home Exercise Program  Safety Devices  Type of devices: Left in chair, Gait belt, Call light within reach  Restraints  Initially in place: No     Therapy Time   Individual Concurrent Group Co-treatment   Time In 8693         Time Out 1606         Minutes [address], Student Physical Therapist Assistant

## 2018-09-05 NOTE — CARE COORDINATION
LUIS received call from [facility] CHILDREN with [facility] regarding pt has been denied by her insurance for IP rehab. If the physician would like to complete the peer to peer, the physician can call [phone] to initiate the peer to peer, the physician will select option #1, then option #1 again. The physician will need to leave a message with their name and phone number and the patient name and date of birth to schedule an appointment for the peer to peer. Once the appointment has been scheduled the physician should call the same number within ten minutes of the scheduled appointment time to complete the peer to peer. LUIS spoke with pt RN Lovely Barone and Sandeep Mesa RN clinical lead on unit to inform of the need for the peer to peer.

## 2018-09-05 NOTE — PROGRESS NOTES
Subcutaneous Nightly    vitamin D  2,000 Units Oral Daily    fluconazole  200 mg Oral Daily    diltiazem  120 mg Oral Daily    magnesium oxide  400 mg Oral BID    polyethylene glycol  17 g Oral Daily    pantoprazole  40 mg Oral BID AC    metoprolol succinate  25 mg Oral Daily    ipratropium-albuterol  1 ampule Inhalation TID    calcitRIOL  0.25 mcg Oral Daily    amiodarone  200 mg Oral BID    aspirin  81 mg Oral Daily    atorvastatin  20 mg Oral Nightly    mometasone-formoterol  2 puff Inhalation BID    carbidopa-levodopa  1 tablet Oral 4x Daily    conjugated estrogens  0.5 g Vaginal Every Other Day    ferrous sulfate  325 mg Oral BID WC    rasagiline mesylate  1 tablet Oral Daily    rOPINIRole  2 mg Oral TID    senna  2 tablet Oral Daily    sodium chloride flush  10 mL Intravenous 2 times per day    linagliptin  5 mg Oral Daily    sodium chloride flush  10 mL Intravenous 2 times per day    miconazole   Topical BID     Continuous Infusions:   dextrose           Assessment/ Plan :   Paroxysmal atrial fibrillation  Chronic obstructive pulmonary disease. Hypertension  Esophagitis  CKD -  III  Anemia  Hypomagnesemia. Agree with plans for rehab. Continue current medications. Will give some IV Magnesium in addition to oral supplementation. Thank you very much for allowing us to participate in the care of this patient. Please call us with any questions.     Electronically signed by Emmanuel Leavitt MD on 9/5/2018 at 5:35 PM

## 2018-09-05 NOTE — PROGRESS NOTES
lesion by hot bx forceps (N/A, 4/25/2017); Cystorrhaphy (04/04/2018); pr cystourethroscopy,biopsies (N/A, 4/4/2018); bronchoscopy (06/05/2018); pr [facility] incl fluor gdnce dx w/cell washg spx (N/A, 6/5/2018); Upper gastrointestinal endoscopy (08/29/2018); and Upper gastrointestinal endoscopy (N/A, 8/29/2018). Restrictions  Restrictions/Precautions  Restrictions/Precautions: General Precautions, Fall Risk, Contact Precautions  Required Braces or Orthoses?: No  Position Activity Restriction  Other position/activity restrictions:  Up with assist  Subjective   General  Chart Reviewed: Yes  Patient assessed for rehabilitation services?: Yes  Additional Pertinent Hx: Dx of Parkinsons  Response to previous treatment: Patient with no complaints from previous session  Family / Caregiver Present: Yes (Spouse and daughter)  General Comment  Comments: LILLY Villanueva stated pt was ok for therapy  Pain Assessment  Patient Currently in Pain: Denies  Vital Signs  Patient Currently in Pain: Denies  Patient Observation  Observations: O2  (2L)   Orientation  Orientation  Overall Orientation Status: Within Functional Limits  Objective    ADL  Feeding: Independent  Grooming: Contact guard assistance (Standing at sinkside with RW for support)  Toileting: Minimal assistance        Balance  Sitting Balance: Stand by assistance  Standing Balance: Contact guard assistance  Standing Balance  Time: 2-3 mins  Activity: CGA to demo functional mobility to and from bathroom   Sit to stand: Contact guard assistance  Stand to sit: Contact guard assistance  Comment: Pt demos SOB after functional mobility requiring VC's for proper breathing techniques  Functional Mobility  Functional - Mobility Device: Rolling Walker  Assist Level: Contact guard assistance  Toilet Transfers  Toilet - Technique: Ambulating  Equipment Used: Standard toilet  Toilet Transfer: Contact guard assistance     Transfers  Stand Step Transfers: Contact guard assistance  Sit to stand: Contact guard assistance  Stand to sit: Contact guard assistance     Perception  Overall Perceptual Status: WFL     LUE AROM (degrees)  LUE AROM : WFL  RUE AROM (degrees)  RUE AROM : WFL     Assessment   Performance deficits / Impairments: Decreased functional mobility ; Decreased ADL status; Decreased strength;Decreased safe awareness;Decreased endurance;Decreased balance  Prognosis: Good  Decision Making: Medium Complexity  Patient Education: Educ on OT goals/POC, pursed lip breathing, EC/WS techniques, safe transfer techniques  REQUIRES OT FOLLOW UP: Yes  Activity Tolerance  Activity Tolerance: Patient Tolerated treatment well  Safety Devices  Safety Devices in place: Yes  Type of devices: Left in chair;Nurse notified;Call light within reach; All fall risk precautions in place          Plan   Plan  Times per week: 4-5x/week, 1-2x/day  Current Treatment Recommendations: Strengthening, Functional Mobility Training, Balance Training, Endurance Training, Safety Education & Training, Self-Care / ADL, Patient/Caregiver Education & Training, Equipment Evaluation, Education, & procurement  G-Code     OutComes Score     AM-PAC Score             Goals  Short term goals  Time Frame for Short term goals: by discharge, pt will  Short term goal 1: demo SBA with ADL transfers with good safety  Short term goal 2: demo SBA with functional mob for ADL completion with good safety and pacing  Short term goal 3: demo min A with toileting routine  Short term goal 4: demo min A with UB and LB ADLs with approp DME/AE and good pacing  Short term goal 5: verb and demo good understanding of fall prevention techs, EC/WS techs, and possible equip needs for home  Patient Goals   Patient goals : to go to inpatient rehab       Therapy Time   Individual Concurrent Group Co-treatment   Time In  1505         Time Out  1534         Minutes  [facility]

## 2018-09-05 NOTE — PROGRESS NOTES
Recent Surgical History: None = 0      Assessment: unable to perform, home meds      Peak Flow (asthma only)     Predicted:                    Personal Best:   PEF   % Predicted   Peak Flow : not applicable = 0     YQE2/WIO     FEV1 Predicted       FEV1      FEV1 % Predicted   FVC   IS volume   IBW  FIO2% 28  SPO2 98  RR 18  Breath Sounds: clear/diminished        · Bronchodilator assessment at level  2  · Hyperinflation assessment at level   · Secretion Management assessment at level    ·    · []    Bronchodilator Assessment  BRONCHODILATOR ASSESSMENT SCORE  Score 0 1 2 3 4 5   Breath Sounds    []  Patient Baseline []  No Wheeze good aeration [x]  Faint, scattered wheezing, good aeration []  Expiratory Wheezing and or moderately diminished []  Insp/Exp wheeze and/or very diminished []  Insp/Exp and/ or marked distress   Respiratory Rate   []  Patient Baseline []  Less than 20 [x]  Less than 20 []  20-25 []  Greater than 25 []  Greater than 25   Peak flow % of Pred or PB [x]  NA    []  Greater than 90%  []  81-90% []  71-80% []  Less than or equal to 70%  or unable to perform []  Unable due to Respiratory Distress   Dyspnea re []  Patient Baseline []  No SOB []  No SOB [x]  SOB on exertion []  SOB min activity []  At rest/acute   e FEV% Predicted          []  NA []  Above 69%  []  Unable []  Above 60-69%  [x]  Unable []  Above 50-59%  []  Unable []  Above 35-49%  []  Unable []  Less than 35%  []  Unable                    []  Hyperinflation Assessment  Score 1 2 3   CXR and Breath Sounds    []  Clear []  No atelectasis  Basilar aeration []  Atelectasis or absent basilar breath sounds   Incentive Spirometry Volume  (Per IBW)   []  Greater than or equal to 15ml/Kg []  less than 15ml/Kg []  less than 15ml/Kg   Surgery within last 2 weeks []  None or general    []  Abdominal or thoracic surgery  []  Abdominal or thoracic   Chronic Pulmonary Historyre []  No []  Yes []  Yes      []  Secretion Management Assessment  Score 1 2 3   Bilateral Breath Sounds    []  Occasional Rhonchi []  Scattered Rhonchi []  Course Rhonchi and/or poor aeration   Sputum    []  Small amount of thin secretions []  Moderate amount of viscous secretions []  Copius, Viscious Yellow/ Secretions   CXR as reported by physician []  clear  []  Unavailable []  Infiltrates and/or consolidation  []  Unavailable []  Mucus Plugging and or lobar consolidation  []  Unavailable   Cough []  Strong, productive cough []  Weak productive cough []  No cough or weak non-productive cough

## 2018-09-05 NOTE — PROGRESS NOTES
(24Hr):     Intake/Output Summary (Last 24 hours) at 09/05/18 0903  Last data filed at 09/05/18 0840   Gross per 24 hour   Intake               10 ml   Output                0 ml   Net               10 ml       Labs:    Hematology:  Recent Labs      09/04/18   0535   PLT  295     Chemistry:  Recent Labs      09/03/18   0548  09/04/18   0535  09/05/18   0519   NA  141  142  139   K  3.6*  3.5*  3.8   CL  90*  89*  90*   CO2  40*  40*  39*   GLUCOSE  156*  203*  194*   BUN  39*  40*  42*   CREATININE  1.91*  1.94*  2.09*   MG  1.7  1.6  1.6   ANIONGAP  11  13  10   LABGLOM  26*  25*  23*   GFRAA  31*  31*  28*   CALCIUM  7.1*  7.0*  7.1*     Recent Labs      09/03/18   2023  09/04/18   0802  09/04/18   1158  09/04/18   1727  09/04/18   2109  09/05/18   0814   POCGLU  269*  155*  182*  423*  259*  171*         Lab Results   Component Value Date/Time    SPECIAL LT HAND 7ML 08/20/2018 03:45 AM     Lab Results   Component Value Date/Time    CULTURE NO GROWTH 6 DAYS 08/20/2018 03:45 AM       Lab Results   Component Value Date    POCPH 7.36 06/02/2013    POCPCO2 55 06/02/2013    POCPO2 63 06/02/2013    POCHCO3 31.0 06/02/2013    NBEA NOT REPORTED 06/02/2013    PBEA 6 06/02/2013    UCE2SRQ 33 06/02/2013    UQGI3QLB 90 06/02/2013    FIO2 45.0 01/15/2017       Radiology:    Chest x-ray with improving CHF    Physical Examination:        General appearance:  alert, cooperative and no distress  Mental Status:  oriented to person, place and time and normal affect  Lungs:  clear to auscultation bilaterally, normal effort, decreased airflow  Heart:  regular rate and rhythm, no murmur  Abdomen:  soft, nontender, nondistended, normal bowel sounds, no masses, hepatomegaly, splenomegaly  Extremities:  no edema, redness, tenderness in the calves  Skin:  no gross lesions, rashes, induration    Assessment:        Primary Problem  Acute kidney injury superimposed on chronic kidney disease [facility])    Active Hospital Problems    Diagnosis Date

## 2018-09-05 NOTE — PLAN OF CARE
Problem: Risk for Impaired Skin Integrity  Goal: Tissue integrity - skin and mucous membranes  Structural intactness and normal physiological function of skin and  mucous membranes. Outcome: Ongoing  No new altered skin integrity this shift. Will continue to monitor. Problem: Falls - Risk of:  Goal: Will remain free from falls  Will remain free from falls   Outcome: Ongoing  No falls this shift. Goal: Absence of physical injury  Absence of physical injury   Outcome: Ongoing  No injury this shift. Problem: Pain:  Goal: Pain level will decrease  Pain level will decrease   Outcome: Ongoing  Pt rated pain as high as 3. Prn tylenol effective for pain control.    Goal: Control of acute pain  Control of acute pain   Outcome: Ongoing  See assessment   Goal: Control of chronic pain  Control of chronic pain   Outcome: Ongoing  See assessment

## 2018-09-06 LAB
ANION GAP SERPL CALCULATED.3IONS-SCNC: 12 MMOL/L (ref 9–17)
BUN BLDV-MCNC: 37 MG/DL (ref 8–23)
BUN/CREAT BLD: 21 (ref 9–20)
CALCIUM SERPL-MCNC: 7.2 MG/DL (ref 8.6–10.4)
CHLORIDE BLD-SCNC: 93 MMOL/L (ref 98–107)
CO2: 37 MMOL/L (ref 20–31)
CREAT SERPL-MCNC: 1.8 MG/DL (ref 0.5–0.9)
GFR AFRICAN AMERICAN: 34 ML/MIN
GFR NON-AFRICAN AMERICAN: 28 ML/MIN
GFR SERPL CREATININE-BSD FRML MDRD: ABNORMAL ML/MIN/{1.73_M2}
GFR SERPL CREATININE-BSD FRML MDRD: ABNORMAL ML/MIN/{1.73_M2}
GLUCOSE BLD-MCNC: 123 MG/DL (ref 70–99)
GLUCOSE BLD-MCNC: 126 MG/DL (ref 65–105)
GLUCOSE BLD-MCNC: 235 MG/DL (ref 65–105)
GLUCOSE BLD-MCNC: 331 MG/DL (ref 65–105)
GLUCOSE BLD-MCNC: 354 MG/DL (ref 65–105)
MAGNESIUM: 1.9 MG/DL (ref 1.6–2.6)
PLATELET # BLD: 243 K/UL (ref 130–400)
POTASSIUM SERPL-SCNC: 3.7 MMOL/L (ref 3.7–5.3)
SODIUM BLD-SCNC: 142 MMOL/L (ref 135–144)

## 2018-09-06 PROCEDURE — 6370000000 HC RX 637 (ALT 250 FOR IP): Performed by: NURSE PRACTITIONER

## 2018-09-06 PROCEDURE — 6370000000 HC RX 637 (ALT 250 FOR IP): Performed by: INTERNAL MEDICINE

## 2018-09-06 PROCEDURE — 97530 THERAPEUTIC ACTIVITIES: CPT

## 2018-09-06 PROCEDURE — 94640 AIRWAY INHALATION TREATMENT: CPT

## 2018-09-06 PROCEDURE — 36415 COLL VENOUS BLD VENIPUNCTURE: CPT

## 2018-09-06 PROCEDURE — 94760 N-INVAS EAR/PLS OXIMETRY 1: CPT

## 2018-09-06 PROCEDURE — 99232 SBSQ HOSP IP/OBS MODERATE 35: CPT | Performed by: INTERNAL MEDICINE

## 2018-09-06 PROCEDURE — 2060000000 HC ICU INTERMEDIATE R&B

## 2018-09-06 PROCEDURE — 97530 THERAPEUTIC ACTIVITIES: CPT | Performed by: NURSE PRACTITIONER

## 2018-09-06 PROCEDURE — 82947 ASSAY GLUCOSE BLOOD QUANT: CPT

## 2018-09-06 PROCEDURE — 2700000000 HC OXYGEN THERAPY PER DAY

## 2018-09-06 PROCEDURE — 2580000003 HC RX 258: Performed by: RADIOLOGY

## 2018-09-06 PROCEDURE — 85049 AUTOMATED PLATELET COUNT: CPT

## 2018-09-06 PROCEDURE — 80048 BASIC METABOLIC PNL TOTAL CA: CPT

## 2018-09-06 PROCEDURE — 97116 GAIT TRAINING THERAPY: CPT

## 2018-09-06 PROCEDURE — 97110 THERAPEUTIC EXERCISES: CPT

## 2018-09-06 PROCEDURE — 83735 ASSAY OF MAGNESIUM: CPT

## 2018-09-06 RX ADMIN — ATORVASTATIN CALCIUM 20 MG: 20 TABLET, FILM COATED ORAL at 21:52

## 2018-09-06 RX ADMIN — CARBIDOPA AND LEVODOPA 1 TABLET: 25; 100 TABLET, EXTENDED RELEASE ORAL at 13:41

## 2018-09-06 RX ADMIN — FUROSEMIDE 40 MG: 40 TABLET ORAL at 16:56

## 2018-09-06 RX ADMIN — ROPINIROLE HYDROCHLORIDE 2 MG: 2 TABLET, FILM COATED ORAL at 13:40

## 2018-09-06 RX ADMIN — MICONAZOLE NITRATE: 20.6 POWDER TOPICAL at 21:55

## 2018-09-06 RX ADMIN — PANTOPRAZOLE SODIUM 40 MG: 40 TABLET, DELAYED RELEASE ORAL at 16:56

## 2018-09-06 RX ADMIN — Medication 10 ML: at 08:51

## 2018-09-06 RX ADMIN — ASPIRIN 81 MG: 81 TABLET, COATED ORAL at 08:49

## 2018-09-06 RX ADMIN — FLUCONAZOLE 200 MG: 100 TABLET ORAL at 08:49

## 2018-09-06 RX ADMIN — IPRATROPIUM BROMIDE AND ALBUTEROL SULFATE 1 AMPULE: .5; 3 SOLUTION RESPIRATORY (INHALATION) at 14:57

## 2018-09-06 RX ADMIN — IPRATROPIUM BROMIDE AND ALBUTEROL SULFATE 1 AMPULE: .5; 3 SOLUTION RESPIRATORY (INHALATION) at 21:00

## 2018-09-06 RX ADMIN — VITAMIN D, TAB 1000IU (100/BT) 2000 UNITS: 25 TAB at 08:50

## 2018-09-06 RX ADMIN — FUROSEMIDE 40 MG: 40 TABLET ORAL at 08:50

## 2018-09-06 RX ADMIN — ROPINIROLE HYDROCHLORIDE 2 MG: 2 TABLET, FILM COATED ORAL at 21:52

## 2018-09-06 RX ADMIN — SENNOSIDES 17.2 MG: 8.6 TABLET, FILM COATED ORAL at 08:50

## 2018-09-06 RX ADMIN — INSULIN LISPRO 6 UNITS: 100 INJECTION, SOLUTION INTRAVENOUS; SUBCUTANEOUS at 21:53

## 2018-09-06 RX ADMIN — DILTIAZEM HYDROCHLORIDE 120 MG: 120 CAPSULE, COATED, EXTENDED RELEASE ORAL at 08:50

## 2018-09-06 RX ADMIN — POLYETHYLENE GLYCOL (3350) 17 G: 17 POWDER, FOR SOLUTION ORAL at 08:49

## 2018-09-06 RX ADMIN — MAGNESIUM OXIDE TAB 400 MG (241.3 MG ELEMENTAL MG) 400 MG: 400 (241.3 MG) TAB at 21:52

## 2018-09-06 RX ADMIN — CARBIDOPA AND LEVODOPA 1 TABLET: 25; 100 TABLET, EXTENDED RELEASE ORAL at 16:57

## 2018-09-06 RX ADMIN — ACETAMINOPHEN 650 MG: 325 TABLET ORAL at 08:50

## 2018-09-06 RX ADMIN — CARBIDOPA AND LEVODOPA 1 TABLET: 25; 100 TABLET, EXTENDED RELEASE ORAL at 21:52

## 2018-09-06 RX ADMIN — CARBIDOPA AND LEVODOPA 1 TABLET: 25; 100 TABLET, EXTENDED RELEASE ORAL at 08:49

## 2018-09-06 RX ADMIN — ROPINIROLE HYDROCHLORIDE 2 MG: 2 TABLET, FILM COATED ORAL at 08:50

## 2018-09-06 RX ADMIN — INSULIN LISPRO 6 UNITS: 100 INJECTION, SOLUTION INTRAVENOUS; SUBCUTANEOUS at 12:47

## 2018-09-06 RX ADMIN — INSULIN LISPRO 15 UNITS: 100 INJECTION, SOLUTION INTRAVENOUS; SUBCUTANEOUS at 17:58

## 2018-09-06 RX ADMIN — MAGNESIUM OXIDE TAB 400 MG (241.3 MG ELEMENTAL MG) 400 MG: 400 (241.3 MG) TAB at 12:47

## 2018-09-06 RX ADMIN — PANTOPRAZOLE SODIUM 40 MG: 40 TABLET, DELAYED RELEASE ORAL at 06:03

## 2018-09-06 RX ADMIN — CALCITRIOL 0.25 MCG: 0.25 CAPSULE ORAL at 08:50

## 2018-09-06 RX ADMIN — MICONAZOLE NITRATE: 20.6 POWDER TOPICAL at 08:49

## 2018-09-06 RX ADMIN — Medication 10 ML: at 21:52

## 2018-09-06 RX ADMIN — AMIODARONE HYDROCHLORIDE 200 MG: 200 TABLET ORAL at 21:52

## 2018-09-06 RX ADMIN — PREDNISONE 20 MG: 20 TABLET ORAL at 08:51

## 2018-09-06 RX ADMIN — IPRATROPIUM BROMIDE AND ALBUTEROL SULFATE 1 AMPULE: .5; 3 SOLUTION RESPIRATORY (INHALATION) at 09:41

## 2018-09-06 RX ADMIN — AMIODARONE HYDROCHLORIDE 200 MG: 200 TABLET ORAL at 08:49

## 2018-09-06 RX ADMIN — FERROUS SULFATE TAB EC 325 MG (65 MG FE EQUIVALENT) 325 MG: 325 (65 FE) TABLET DELAYED RESPONSE at 16:56

## 2018-09-06 RX ADMIN — METOPROLOL SUCCINATE 25 MG: 25 TABLET, FILM COATED, EXTENDED RELEASE ORAL at 08:50

## 2018-09-06 RX ADMIN — RASAGILINE 1 MG: 1 TABLET ORAL at 09:16

## 2018-09-06 RX ADMIN — LINAGLIPTIN 5 MG: 5 TABLET, FILM COATED ORAL at 08:50

## 2018-09-06 RX ADMIN — FERROUS SULFATE TAB EC 325 MG (65 MG FE EQUIVALENT) 325 MG: 325 (65 FE) TABLET DELAYED RESPONSE at 08:50

## 2018-09-06 ASSESSMENT — PAIN SCALES - GENERAL
PAINLEVEL_OUTOF10: 7
PAINLEVEL_OUTOF10: 5
PAINLEVEL_OUTOF10: 10
PAINLEVEL_OUTOF10: 6

## 2018-09-06 ASSESSMENT — PAIN DESCRIPTION - LOCATION: LOCATION: HEAD

## 2018-09-06 NOTE — CARE COORDINATION
Discharge planning    Found out in quality flow rounds peer to peer needs to be completed. Dr Trinidad Andrews aware.  Time for call will be Friday at 1000

## 2018-09-06 NOTE — PLAN OF CARE
Problem: Falls - Risk of:  Goal: Will remain free from falls  Will remain free from falls   Outcome: Ongoing  Patient is a high fall risk per falls risk assessment. Remains free from falls and injuries, call light at reach and utilized appropriately. Bed in lowest position with wheels locked and alarm armed. Personal items that are used frequently are within reach.  Up to chair with assist, no attempts to get out of bed unassisted noted or reported, falling star program and hourly rounding implemented, will continue to monitor and educate as needed

## 2018-09-06 NOTE — PLAN OF CARE
Problem: Falls - Risk of:  Goal: Will remain free from falls  Will remain free from falls   Outcome: Ongoing  Patient will use call light prior to ambulation. Bed in lowest position, wheels locked, call light within reach. Non-skid foot wear on. RN will continue to monitor on hourly rounds and as needed.      Comments: Careplan reviewed with patient

## 2018-09-06 NOTE — PROGRESS NOTES
unspecified type diabetes mellitus without mention of complication, not stated as uncontrolled; Unspecified sleep apnea; Urethral caruncle; and Wears glasses. has a past surgical history that includes Cervical disc surgery (2012); Appendectomy; Tonsillectomy and adenoidectomy (1953); hernia repair (1999); eye surgery (Bilateral, 2017); Cervical spine surgery (5/31/13); laminectomy (05/31/2013); Upper gastrointestinal endoscopy (12/28/2016); Colonoscopy (2009); Colonoscopy (12/29/2016); Colonoscopy (12/30/2016); Colonoscopy (04/25/2017); pr colsc flx w/removal lesion by hot bx forceps (N/A, 4/25/2017); Cystorrhaphy (04/04/2018); pr cystourethroscopy,biopsies (N/A, 4/4/2018); bronchoscopy (06/05/2018); [facility]g spx (N/A, 6/5/2018); Upper gastrointestinal endoscopy (08/29/2018); and Upper gastrointestinal endoscopy (N/A, 8/29/2018). Restrictions  Restrictions/Precautions  Restrictions/Precautions: General Precautions, Fall Risk, Contact Precautions  Required Braces or Orthoses?: No  Position Activity Restriction  Other position/activity restrictions: Up with assist, IV  Subjective   General  Chart Reviewed: Yes  Patient assessed for rehabilitation services?: Yes  Additional Pertinent Hx: Dx of Parkinsons  Response to previous treatment: Patient with no complaints from previous session  Family / Caregiver Present: No  General Comment  Comments: LILLY Stauffer stated pt was ok for therapy  Pre Treatment Pain Screening  Intervention List: Patient able to continue with treatment  Comments / Details: Pt refusing upon arrival, verbally upset that she did not have food. Edu provided due to pt refusing out of chair activity. Pain Assessment  Patient Currently in Pain: Yes  Pain Assessment: 0-10  Pain Level: 10  Pain Location: Head  Pain Intervention(s): Distraction;Food;Relaxation techniques; Emotional support; Environmental changes;RN notified;Medication (see eMar)  Response to Pain

## 2018-09-06 NOTE — PROGRESS NOTES
Physical Therapy  Facility/Department: [facility] PROGRESSIVE CARE  Daily Treatment Note  NAME: Juan Moore  : 1945  MRN: [id number]    Date of Service: 2018    Discharge Recommendations:  IP Rehab        Patient Diagnosis(es): The primary encounter diagnosis was Acute renal failure, unspecified acute renal failure type ([facility] Diagnoses of Lactic acidemia, Nausea, Chronic anemia, Hypoglycemia, Dehydration, History of drug resistance, SIRS (systemic inflammatory response syndrome) ([facility] Acute cystitis with hematuria, and History of cellulitis were also pertinent to this visit. has a past medical history of Acute on chronic diastolic congestive heart failure ([facility] Anesthesia complication; Asthma; Atrial fibrillation ([facility] Cataracts, bilateral; Cellulitis; Cerebral artery occlusion with cerebral infarction ([facility] Chronic kidney disease; Constipation; COPD (chronic obstructive pulmonary disease) ([facility] Difficult intravenous access; Diverticulosis; DM2 (diabetes mellitus, type 2) ([facility] Full dentures; GERD (gastroesophageal reflux disease); Headache(784.0); [place] (hard of hearing); Hyperlipidemia; Hyperplastic polyp of intestine; Hypertension; Impaired ambulation; Kidney stone; Morbid obesity with BMI of 40.0-44.9, adult ([facility] ECTOR on CPAP; Osteoarthritis; Parkinson disease ([facility] Restless leg syndrome; Spinal stenosis in cervical region; Type II or unspecified type diabetes mellitus without mention of complication, not stated as uncontrolled; Unspecified sleep apnea; Urethral caruncle; and Wears glasses. has a past surgical history that includes Cervical disc surgery (); Appendectomy; Tonsillectomy and adenoidectomy (); hernia repair (); eye surgery (Bilateral, 2017); Cervical spine surgery (13); laminectomy (2013); Upper gastrointestinal endoscopy (2016); Colonoscopy (); Colonoscopy (2016); Colonoscopy (2016);  Colonoscopy (2017); pr colsc flx w/removal

## 2018-09-06 NOTE — PROGRESS NOTES
lesion by hot bx forceps (N/A, 4/25/2017); Cystorrhaphy (04/04/2018); pr cystourethroscopy,biopsies (N/A, 4/4/2018); bronchoscopy (06/05/2018); pr [facility] incl fluor gdnce dx w/cell washg spx (N/A, 6/5/2018); Upper gastrointestinal endoscopy (08/29/2018); and Upper gastrointestinal endoscopy (N/A, 8/29/2018). Restrictions  Restrictions/Precautions  Restrictions/Precautions: General Precautions, Fall Risk, Contact Precautions  Required Braces or Orthoses?: No  Position Activity Restriction  Other position/activity restrictions: Up with assist, IV  Subjective   General  Chart Reviewed: Yes  Response To Previous Treatment: Patient with no complaints from previous session. Family / Caregiver Present: Yes  Subjective  Subjective: Pt agreeable to PT  General Comment  Comments: Luan CARR states pt is medically stable for PT  Pain Screening  Patient Currently in Pain: Yes  Pain Assessment  Pain Assessment: 0-10  Pain Level: 6  Vital Signs  Patient Currently in Pain: Yes  Patient Observation  Observations: O2   Pre Treatment Pain Screening  Intervention List: Patient able to continue with treatment    Orientation     Objective      Transfers  Sit to Stand: Contact guard assistance  Stand to sit: Contact guard assistance  Bed to Chair: Contact guard assistance  Stand Pivot Transfers: Contact guard assistance  Ambulation  Ambulation?: Yes  More Ambulation?: Yes  Ambulation 1  Surface: level tile  Device: Rolling Walker  Other Apparatus: O2  Assistance: Contact guard assistance  Quality of Gait: Diminished step length  Distance: 20' x 2  Comments: Pt amb from bedside chair to bathroom and back to chair for seated rest break. Ambulation 2  Surface - 2: level tile  Device 2: Rolling Walker  Other Apparatus 2: O2  Assistance 2: Contact guard assistance  Distance: 50' x1, 25'x1  Comments: Pt able to amb into hallway. Pt requested to sit in [facility] for rest break.  Pts  rolled WC further through hallway per pts request to simply \"get out of that room\". Pt was able to amb from doorway to bedside chair. Stairs/Curb  Stairs?: No     Balance  Posture: Fair  Sitting - Static: Good  Sitting - Dynamic: Good  Standing - Static: Good;-  Standing - Dynamic: Fair;+  Comments: standing balance assessed with RW. Exercises  Comments: Seated LE ex x 20  Other exercises  Other exercises?: Yes  Other exercises 1: Sit to stand x 5 with CGA                        Assessment   Body structures, Functions, Activity limitations: Decreased functional mobility ; Decreased strength;Decreased safe awareness;Decreased endurance;Decreased balance;Decreased coordination  Assessment: Pt very motivated to get better and increase mobility. Pt would be able to tolerate 3+ hours of inpatient PT. Pt required skilled inpatient PT to increase strength, endurance, mobility, and independence. Prognosis: Good  Decision Making: Medium Complexity  REQUIRES PT FOLLOW UP: Yes  Activity Tolerance  Activity Tolerance: Patient limited by endurance  Activity Tolerance: Pt motivated     G-Code     OutComes Score          AM-PAC Score  AM-PAC Inpatient Mobility Raw Score : 16  AM-PAC Inpatient T-Scale Score : 40.78  Mobility Inpatient CMS 0-100% Score: 54.16  Mobility Inpatient CMS G-Code Modifier : CK          Goals  Short term goals  Time Frame for Short term goals: 12 tx's  Short term goal 1: Bed mobiity independent   Short term goal 2: Pt transfer independently  Short term goal 3: Pt amb 100 ft with rw nd Patsy   Short term goal 4: Pt particpate in 30  minutes ther ex to increase strength for function  Patient Goals   Patient goals : Pt goal is to keep her strength up and go home    Plan    Plan  Times per week: 1-2x/day. 5-6x/week.    Specific instructions for Next Treatment: progress gait/balance  Current Treatment Recommendations: Strengthening, ROM, Safety Education & Training, Positioning, Patient/Caregiver Education & Training, Home Exercise Program  Safety

## 2018-09-06 NOTE — PROGRESS NOTES
Pulmonary Critical Care Progress Note  Kimberley Dexter CNP / Dr. Rao Duran M.D. Patient seen for the follow up of Acute on chronic respiratory failure, pulmonary edema/pleural effusions, diastolic heart failure, exacerbation COPD     Subjective:  No acute events noted overnight. Her shortness of breath seems to be at her baseline. She has occasional cough without significant sputum production. She is hoping for discharge soon. Examination:  Vitals: BP (!) 139/57   Pulse 67   Temp 98.2 °F (36.8 °C) (Oral)   Resp 16   Ht 5' 1\" (1.549 m)   Wt 177 lb 8 oz (80.5 kg)   LMP 04/03/2004 (Within Years)   SpO2 98%   BMI 33.54 kg/m²     General appearance: alert and cooperative with exam, sitting up in chair finishing breakfast  Neck: No JVD  Lungs: Moderate air exchange, rales, no wheeze  Heart: regular rate and rhythm, S1, S2 normal, no gallop  Abdomen: Soft, non tender, + BS  Extremities: no cyanosis or clubbing.  No significant edema    LABs:  CBC:   Recent Labs      09/04/18   0535  09/06/18   0604   PLT  295  243     BMP:   Recent Labs      09/05/18   0519  09/06/18   0604   NA  139  142   K  3.8  3.7   CO2  39*  37*   BUN  42*  37*   CREATININE  2.09*  1.80*   LABGLOM  23*  28*   GLUCOSE  194*  123*     Radiology:  9/5/18      Impression:  · Acute on chronic respiratory failure  · Pulmonary edema/bilateral pleural effusion/diastolic heart failure  · Acute exacerbation of COPD  · 30-pack-year smoking  · Dysphagia/aspiration,?  Aspiration pneumonitis versus pneumonia  · Obstructive sleep apnea/obesity  · KRISTIN on Chronic kidney disease  · A. fib with RVR  · HDL/HTN/DM     Recommendations:  · O2 2 liters NC  · Off antibiotics (Levaquin and Clinda), still on Diflucan  · Albuterol and Ipratropium Q 4 hours and prn  · Dulera 200  · Diuresis Per Nephrology following   · Prednisone taper  · Continue dysphagia diet  · PT/OT  · Follow-up CT scan of the chest in 6-8 weeks  · DVT prophylaxis with low molecular

## 2018-09-06 NOTE — PROGRESS NOTES
ondansetron **OR** ondansetron, sodium chloride flush, promethazine    Data:     Past Medical History:   has a past medical history of Acute on chronic diastolic congestive heart failure ([facility] Anesthesia complication; Asthma; Atrial fibrillation ([facility] Cataracts, bilateral; Cellulitis; Cerebral artery occlusion with cerebral infarction ([facility] Chronic kidney disease; Constipation; COPD (chronic obstructive pulmonary disease) ([facility] Difficult intravenous access; Diverticulosis; DM2 (diabetes mellitus, type 2) ([facility] Full dentures; GERD (gastroesophageal reflux disease); Headache(784.0); [place] (hard of hearing); Hyperlipidemia; Hyperplastic polyp of intestine; Hypertension; Impaired ambulation; Kidney stone; Morbid obesity with BMI of 40.0-44.9, adult ([facility] ECTOR on CPAP; Osteoarthritis; Parkinson disease ([facility] Restless leg syndrome; Spinal stenosis in cervical region; Type II or unspecified type diabetes mellitus without mention of complication, not stated as uncontrolled; Unspecified sleep apnea; Urethral caruncle; and Wears glasses. Social History:   reports that she quit smoking about 47 years ago. Her smoking use included Cigarettes. She has a 30.00 pack-year smoking history. She has never used smokeless tobacco. She reports that she drinks about 1.2 oz of alcohol per week . She reports that she does not use drugs.      Family History:   Family History   Problem Relation Age of Onset    Heart Failure Mother     Hypertension Mother     Heart Disease Mother     High Blood Pressure Mother        Vitals:  BP (!) 107/43   Pulse 73   Temp 98.1 °F (36.7 °C) (Oral)   Resp 16   Ht 5' 1\" (1.549 m)   Wt 177 lb 8 oz (80.5 kg)   LMP 2004 (Within Years)   SpO2 96%   BMI 33.54 kg/m²   Temp (24hrs), Av °F (36.7 °C), Min:97.3 °F (36.3 °C), Max:98.4 °F (36.9 °C)    Recent Labs      18   1737  18   1939  18   0752  18   1142   POCGLU  357*  409*  126*  235*       I/O (24Hr):     Intake/Output Summary (Last 24 hours) at 09/06/18 1329  Last data filed at 09/06/18 0525   Gross per 24 hour   Intake             1193 ml   Output                0 ml   Net             1193 ml       Labs:    Hematology:  Recent Labs      09/04/18   0535  09/06/18   0604   PLT  295  243     Chemistry:  Recent Labs      09/04/18   0535  09/05/18   0519  09/05/18   1315  09/06/18   0604   NA  142  139   --   142   K  3.5*  3.8   --   3.7   CL  89*  90*   --   93*   CO2  40*  39*   --   37*   GLUCOSE  203*  194*   --   123*   BUN  40*  42*   --   37*   CREATININE  1.94*  2.09*   --   1.80*   MG  1.6  1.6   --   1.9   ANIONGAP  13  10   --   12   LABGLOM  25*  23*   --   28*   GFRAA  31*  28*   --   34*   CALCIUM  7.0*  7.1*   --   7.2*   CAION   --    --   0.88*   --      Recent Labs      09/05/18   1108  09/05/18   1627  09/05/18   1737  09/05/18   1939  09/06/18   0752  09/06/18   1142   POCGLU  227*  358*  357*  409*  126*  235*         Lab Results   Component Value Date/Time    SPECIAL LT HAND 7ML 08/20/2018 03:45 AM     Lab Results   Component Value Date/Time    CULTURE NO GROWTH 6 DAYS 08/20/2018 03:45 AM       Lab Results   Component Value Date    POCPH 7.36 06/02/2013    POCPCO2 55 06/02/2013    POCPO2 63 06/02/2013    POCHCO3 31.0 06/02/2013    NBEA NOT REPORTED 06/02/2013    PBEA 6 06/02/2013    FYA7YPN 33 06/02/2013    JFAV0HBZ 90 06/02/2013    FIO2 45.0 01/15/2017       Radiology:    No new radiology report    Physical Examination:        General appearance:  alert, cooperative and no distress  Mental Status:  oriented to person, place and time and normal affect  Lungs:  clear to auscultation bilaterally, normal effort  Heart:  regular rate and rhythm, no murmur  Abdomen:  soft, nontender, nondistended, normal bowel sounds, no masses, hepatomegaly, splenomegaly  Extremities:  no edema, redness, tenderness in the calves  Skin:  no gross lesions, rashes, induration    Assessment:        Primary Problem  Acute kidney injury superimposed on chronic kidney disease [facility])    Active Hospital Problems    Diagnosis Date Noted    Candida esophagitis ([facility] [B37.81] 08/31/2018    Odynophagia [R13.10] 08/28/2018    Esophageal dysphagia [R13.10]     Hypomagnesemia [E83.42] 08/27/2018    Thrombocytopenia (HCC) [D69.6] 08/25/2018    Hypovolemic shock ([address] [R57.1] 08/24/2018    Acute hypoxemic respiratory failure (HCC) [J96.01] 08/23/2018    Rapid atrial fibrillation (HCC) [I48.91] 08/23/2018    SIRS (systemic inflammatory response syndrome) (HCC) [R65.10] 08/20/2018    Nausea and vomiting in adult [R11.2] 08/20/2018    Acute kidney injury superimposed on chronic kidney disease ([address] [N17.9, N18.9] 08/20/2018    Bacterial UTI [N39.0, A49.9] 08/20/2018    Dehydration [E86.0]     Anemia of chronic renal failure, stage 4 (severe) ([facility] [N18.4, D63.1] 04/25/2018    CKD stage 3 due to type 2 diabetes mellitus ([address] [E11.22, N18.3] 03/08/2018    ECTOR on CPAP [G47.33, Z99.89]     Chronic atrial fibrillation ([address] [I48.2] 05/31/2013    Essential hypertension [I10] 06/07/2012    Controlled type 2 diabetes mellitus with stage 3 chronic kidney disease, without long-term current use of insulin ([address] [E11.22, N18.3]        Plan:        1. Called for [id number] review for rehab, upon my scheduled for 10 AM tomorrow  2. Monitor and control blood pressure  3. Glycemic control, insulin scale  4. PT, OT and speech therapy  5. GI and DVT prophylaxis  6. Oxygen as ordered  7.  Aerosols as ordered    Antony Knox, DO  9/6/2018  1:29 PM

## 2018-09-06 NOTE — PROGRESS NOTES
Daily    fluconazole  200 mg Oral Daily    diltiazem  120 mg Oral Daily    magnesium oxide  400 mg Oral BID    polyethylene glycol  17 g Oral Daily    pantoprazole  40 mg Oral BID AC    metoprolol succinate  25 mg Oral Daily    ipratropium-albuterol  1 ampule Inhalation TID    calcitRIOL  0.25 mcg Oral Daily    amiodarone  200 mg Oral BID    aspirin  81 mg Oral Daily    atorvastatin  20 mg Oral Nightly    mometasone-formoterol  2 puff Inhalation BID    carbidopa-levodopa  1 tablet Oral 4x Daily    conjugated estrogens  0.5 g Vaginal Every Other Day    ferrous sulfate  325 mg Oral BID WC    rasagiline mesylate  1 tablet Oral Daily    rOPINIRole  2 mg Oral TID    senna  2 tablet Oral Daily    sodium chloride flush  10 mL Intravenous 2 times per day    linagliptin  5 mg Oral Daily    sodium chloride flush  10 mL Intravenous 2 times per day    miconazole   Topical BID     Continuous Infusions:   dextrose           Assessment/ Plan :   Paroxysmal atrial fibrillation  Chronic obstructive pulmonary disease. Hypertension  Esophagitis  CKD -  III  Anemia  Hypomagnesemia. Brunilda Goldstein with discharge. Thank you very much for allowing us to participate in the care of this patient. Please call us with any questions.     Electronically signed by Rachel Mares MD on 9/6/2018 at 11:27 AM

## 2018-09-06 NOTE — PROGRESS NOTES
Darnell Murillo MD   aspirin 81 MG tablet Take 81 mg by mouth daily LAST DOSE 03/31/2018   Yes Historical Provider, MD   rOPINIRole (REQUIP) 2 MG tablet Take 2 mg by mouth 3 times daily   Yes Historical Provider, MD   pantoprazole (PROTONIX) 40 MG tablet Take 1 tablet by mouth 2 times daily (before meals) 1/22/18  Yes Lesly Hill DO   BREO ELLIPTA 100-25 MCG/INH AEPB inhaler Inhale 1 puff into the lungs daily 1/22/18  Yes Lesly Hill DO   ferrous sulfate 325 (65 Fe) MG EC tablet Take 1 tablet by mouth 2 times daily (with meals) 12/21/17  Yes Jose HO Blood, DO   vitamin D (CHOLECALCIFEROL) 5000 units CAPS capsule Take 5,000 Units by mouth daily   Yes Historical Provider, MD   rasagiline mesylate 1 MG TABS Take 1 tablet by mouth daily   Yes Historical Provider, MD   amiodarone (CORDARONE) 200 MG tablet Take 1 tablet by mouth daily 8/27/17  Yes Mavis HO Blood, DO   albuterol (PROVENTIL) (2.5 MG/3ML) 0.083% nebulizer solution Take 3 mLs by nebulization every 6 hours as needed for Wheezing 7/18/17  Yes Lesly Hill DO   Oxygen Concentrator Dx: Pneumonia, use as directed. Patient taking differently: Dx: Pneumonia, use as directed.    ON 24/7 2/10/17  Yes Lesly Hill DO   JANUMET  MG per tablet TAKE 1 TABLET TWICE A DAY WITH MEALS 8/28/18   Lesly Hill DO   calcitRIOL (ROCALTROL) 0.25 MCG capsule TAKE 1 CAPSULE THREE TIMES A DAY 8/22/18   Lesly Hill DO   ondansetron (ZOFRAN) 8 MG tablet TK 1 T PO Q 8 H PRF NAUSEA OR VOM 5/17/18   Historical Provider, MD   HYDROcodone-acetaminophen ([address]) 5-325 MG per tablet TK 1 T PO BID PRF PAIN 7/16/18   Historical Provider, MD   .  Recent Surgical History: None = 0     Assessment     Peak Flow (asthma only)    Predicted:   Personal Best:   PEF   % Predicted   Peak Flow :     FEV1/FVC    FEV1 Predicted       FEV1 Not able to perform test    FEV1 % Predicted   FVC   IS volume   IBW   FIO2% 2L  SPO2 95  RR 18  Breath Sounds: Clear, diminished t/o      · Bronchodilator assessment at level  2  · hyperinflation assessment at level   · Secretion Management assessment at level    ·   · []    Bronchodilator Assessment  BRONCHODILATOR ASSESSMENT SCORE  Score 0 1 2 3 4 5   Breath Sounds   []  Patient Baseline [x]  No Wheeze good aeration []  Faint, scattered wheezing, good aeration []  Expiratory Wheezing and or moderately diminished []  Insp/Exp wheeze and/or very diminished []  Insp/Exp and/ or marked distress   Respiratory Rate   []  Patient Baseline [x]  Less than 20 [x]  Less than 20 []  20-25 []  Greater than 25 []  Greater than 25   Peak flow % of Pred or PB [x]  NA   []  Greater than 90%  []  81-90% []  71-80% []  Less than or equal to 70%  or unable to perform []  Unable due to Respiratory Distress   Dyspnea re []  Patient Baseline []  No SOB [x]  No SOB []  SOB on exertion []  SOB min activity []  At rest/acute   e FEV% Predicted       []  NA []  Above 69%  [x]  Unable []  Above 60-69%  [x]  Unable []  Above 50-59%  [x]  Unable []  Above 35-49%  [x]  Unable []  Less than 35%  []  Unable                 []  Hyperinflation Assessment  Score 1 2 3   CXR and Breath Sounds   []  Clear []  No atelectasis  Basilar aeration []  Atelectasis or absent basilar breath sounds   Incentive Spirometry Volume  (Per IBW)   []  Greater than or equal to 15ml/Kg []  less than 15ml/Kg []  less than 15ml/Kg   Surgery within last 2 weeks []  None or general   []  Abdominal or thoracic surgery  []  Abdominal or thoracic   Chronic Pulmonary Historyre []  No []  Yes []  Yes     []  Secretion Management Assessment  Score 1 2 3   Bilateral Breath Sounds   []  Occasional Rhonchi []  Scattered Rhonchi []  Course Rhonchi and/or poor aeration   Sputum    []  Small amount of thin secretions []  Moderate amount of viscous secretions []  [place], Viscious Yellow/ Secretions   CXR as reported by physician []  clear  []  Unavailable []  Infiltrates and/or consolidation  []  Unavailable

## 2018-09-07 VITALS
TEMPERATURE: 97.5 F | HEART RATE: 72 BPM | WEIGHT: 177.5 LBS | OXYGEN SATURATION: 100 % | DIASTOLIC BLOOD PRESSURE: 47 MMHG | HEIGHT: 61 IN | BODY MASS INDEX: 33.51 KG/M2 | SYSTOLIC BLOOD PRESSURE: 122 MMHG | RESPIRATION RATE: 16 BRPM

## 2018-09-07 LAB
ANION GAP SERPL CALCULATED.3IONS-SCNC: 12 MMOL/L (ref 9–17)
BUN BLDV-MCNC: 35 MG/DL (ref 8–23)
BUN/CREAT BLD: 19 (ref 9–20)
CALCIUM SERPL-MCNC: 7.3 MG/DL (ref 8.6–10.4)
CHLORIDE BLD-SCNC: 93 MMOL/L (ref 98–107)
CO2: 38 MMOL/L (ref 20–31)
CREAT SERPL-MCNC: 1.81 MG/DL (ref 0.5–0.9)
GFR AFRICAN AMERICAN: 33 ML/MIN
GFR NON-AFRICAN AMERICAN: 28 ML/MIN
GFR SERPL CREATININE-BSD FRML MDRD: ABNORMAL ML/MIN/{1.73_M2}
GFR SERPL CREATININE-BSD FRML MDRD: ABNORMAL ML/MIN/{1.73_M2}
GLUCOSE BLD-MCNC: 131 MG/DL (ref 65–105)
GLUCOSE BLD-MCNC: 143 MG/DL (ref 65–105)
GLUCOSE BLD-MCNC: 153 MG/DL (ref 70–99)
MAGNESIUM: 1.7 MG/DL (ref 1.6–2.6)
POTASSIUM SERPL-SCNC: 4 MMOL/L (ref 3.7–5.3)
SODIUM BLD-SCNC: 143 MMOL/L (ref 135–144)

## 2018-09-07 PROCEDURE — 6370000000 HC RX 637 (ALT 250 FOR IP): Performed by: INTERNAL MEDICINE

## 2018-09-07 PROCEDURE — 97535 SELF CARE MNGMENT TRAINING: CPT | Performed by: NURSE PRACTITIONER

## 2018-09-07 PROCEDURE — 94640 AIRWAY INHALATION TREATMENT: CPT

## 2018-09-07 PROCEDURE — 6360000002 HC RX W HCPCS: Performed by: INTERNAL MEDICINE

## 2018-09-07 PROCEDURE — 83735 ASSAY OF MAGNESIUM: CPT

## 2018-09-07 PROCEDURE — 6360000002 HC RX W HCPCS: Performed by: NURSE PRACTITIONER

## 2018-09-07 PROCEDURE — 94760 N-INVAS EAR/PLS OXIMETRY 1: CPT

## 2018-09-07 PROCEDURE — 2580000003 HC RX 258: Performed by: RADIOLOGY

## 2018-09-07 PROCEDURE — 97530 THERAPEUTIC ACTIVITIES: CPT

## 2018-09-07 PROCEDURE — 99239 HOSP IP/OBS DSCHRG MGMT >30: CPT | Performed by: INTERNAL MEDICINE

## 2018-09-07 PROCEDURE — 6370000000 HC RX 637 (ALT 250 FOR IP): Performed by: NURSE PRACTITIONER

## 2018-09-07 PROCEDURE — 36415 COLL VENOUS BLD VENIPUNCTURE: CPT

## 2018-09-07 PROCEDURE — 2700000000 HC OXYGEN THERAPY PER DAY

## 2018-09-07 PROCEDURE — 82947 ASSAY GLUCOSE BLOOD QUANT: CPT

## 2018-09-07 PROCEDURE — 97530 THERAPEUTIC ACTIVITIES: CPT | Performed by: NURSE PRACTITIONER

## 2018-09-07 PROCEDURE — 80048 BASIC METABOLIC PNL TOTAL CA: CPT

## 2018-09-07 PROCEDURE — 97116 GAIT TRAINING THERAPY: CPT

## 2018-09-07 RX ORDER — FUROSEMIDE 40 MG/1
40 TABLET ORAL 2 TIMES DAILY
Qty: 60 TABLET | Refills: 3 | Status: ON HOLD | DISCHARGE
Start: 2018-09-07 | End: 2018-09-27 | Stop reason: HOSPADM

## 2018-09-07 RX ORDER — METOPROLOL SUCCINATE 25 MG/1
25 TABLET, EXTENDED RELEASE ORAL DAILY
Qty: 30 TABLET | Refills: 3 | Status: ON HOLD | DISCHARGE
Start: 2018-09-08 | End: 2018-10-06 | Stop reason: DRUGHIGH

## 2018-09-07 RX ORDER — AMIODARONE HYDROCHLORIDE 200 MG/1
200 TABLET ORAL 2 TIMES DAILY
Qty: 30 TABLET | Refills: 3 | Status: ON HOLD | DISCHARGE
Start: 2018-09-07 | End: 2018-10-06 | Stop reason: DRUGHIGH

## 2018-09-07 RX ORDER — DILTIAZEM HYDROCHLORIDE 120 MG/1
120 CAPSULE, COATED, EXTENDED RELEASE ORAL DAILY
Qty: 30 CAPSULE | Refills: 3 | Status: ON HOLD | DISCHARGE
Start: 2018-09-08 | End: 2018-09-27 | Stop reason: HOSPADM

## 2018-09-07 RX ORDER — IPRATROPIUM BROMIDE AND ALBUTEROL SULFATE 2.5; .5 MG/3ML; MG/3ML
3 SOLUTION RESPIRATORY (INHALATION) 3 TIMES DAILY
Qty: 360 ML | DISCHARGE
Start: 2018-09-07 | End: 2019-03-20 | Stop reason: SDUPTHER

## 2018-09-07 RX ORDER — PREDNISONE 20 MG/1
20 TABLET ORAL DAILY
Qty: 10 TABLET | Refills: 0 | DISCHARGE
Start: 2018-09-08 | End: 2018-09-18

## 2018-09-07 RX ORDER — LANOLIN ALCOHOL/MO/W.PET/CERES
3 CREAM (GRAM) TOPICAL NIGHTLY PRN
Refills: 3 | Status: ON HOLD | DISCHARGE
Start: 2018-09-07 | End: 2019-08-25 | Stop reason: DRUGHIGH

## 2018-09-07 RX ORDER — LEVALBUTEROL 1.25 MG/.5ML
1.25 SOLUTION, CONCENTRATE RESPIRATORY (INHALATION) EVERY 4 HOURS PRN
Status: ON HOLD | DISCHARGE
Start: 2018-09-07 | End: 2018-09-24

## 2018-09-07 RX ORDER — FLUCONAZOLE 200 MG/1
200 TABLET ORAL DAILY
Qty: 7 TABLET | Refills: 0 | DISCHARGE
Start: 2018-09-08 | End: 2018-09-15

## 2018-09-07 RX ADMIN — METOPROLOL SUCCINATE 25 MG: 25 TABLET, FILM COATED, EXTENDED RELEASE ORAL at 11:24

## 2018-09-07 RX ADMIN — FERROUS SULFATE TAB EC 325 MG (65 MG FE EQUIVALENT) 325 MG: 325 (65 FE) TABLET DELAYED RESPONSE at 09:04

## 2018-09-07 RX ADMIN — MICONAZOLE NITRATE: 20.6 POWDER TOPICAL at 09:05

## 2018-09-07 RX ADMIN — CARBIDOPA AND LEVODOPA 1 TABLET: 25; 100 TABLET, EXTENDED RELEASE ORAL at 09:05

## 2018-09-07 RX ADMIN — PREDNISONE 20 MG: 20 TABLET ORAL at 11:25

## 2018-09-07 RX ADMIN — LORAZEPAM 1 MG: 2 INJECTION, SOLUTION INTRAMUSCULAR; INTRAVENOUS at 00:02

## 2018-09-07 RX ADMIN — ACETAMINOPHEN 650 MG: 325 TABLET ORAL at 07:52

## 2018-09-07 RX ADMIN — ASPIRIN 81 MG: 81 TABLET, COATED ORAL at 09:05

## 2018-09-07 RX ADMIN — RASAGILINE 1 MG: 1 TABLET ORAL at 11:36

## 2018-09-07 RX ADMIN — MAGNESIUM OXIDE TAB 400 MG (241.3 MG ELEMENTAL MG) 400 MG: 400 (241.3 MG) TAB at 14:42

## 2018-09-07 RX ADMIN — Medication 10 ML: at 11:26

## 2018-09-07 RX ADMIN — CALCITRIOL 0.25 MCG: 0.25 CAPSULE ORAL at 09:05

## 2018-09-07 RX ADMIN — ROPINIROLE HYDROCHLORIDE 2 MG: 2 TABLET, FILM COATED ORAL at 11:25

## 2018-09-07 RX ADMIN — AMIODARONE HYDROCHLORIDE 200 MG: 200 TABLET ORAL at 09:05

## 2018-09-07 RX ADMIN — CONJUGATED ESTROGENS 0.5 G: 0.62 CREAM VAGINAL at 09:06

## 2018-09-07 RX ADMIN — POLYETHYLENE GLYCOL (3350) 17 G: 17 POWDER, FOR SOLUTION ORAL at 11:26

## 2018-09-07 RX ADMIN — IPRATROPIUM BROMIDE AND ALBUTEROL SULFATE 1 AMPULE: .5; 3 SOLUTION RESPIRATORY (INHALATION) at 14:25

## 2018-09-07 RX ADMIN — FUROSEMIDE 40 MG: 40 TABLET ORAL at 09:05

## 2018-09-07 RX ADMIN — ROPINIROLE HYDROCHLORIDE 2 MG: 2 TABLET, FILM COATED ORAL at 14:42

## 2018-09-07 RX ADMIN — SENNOSIDES 17.2 MG: 8.6 TABLET, FILM COATED ORAL at 11:25

## 2018-09-07 RX ADMIN — LEVALBUTEROL 1.25 MG: 1.25 SOLUTION, CONCENTRATE RESPIRATORY (INHALATION) at 11:41

## 2018-09-07 RX ADMIN — PANTOPRAZOLE SODIUM 40 MG: 40 TABLET, DELAYED RELEASE ORAL at 06:00

## 2018-09-07 RX ADMIN — LINAGLIPTIN 5 MG: 5 TABLET, FILM COATED ORAL at 11:25

## 2018-09-07 RX ADMIN — CARBIDOPA AND LEVODOPA 1 TABLET: 25; 100 TABLET, EXTENDED RELEASE ORAL at 14:43

## 2018-09-07 RX ADMIN — DILTIAZEM HYDROCHLORIDE 120 MG: 120 CAPSULE, COATED, EXTENDED RELEASE ORAL at 09:05

## 2018-09-07 RX ADMIN — FLUCONAZOLE 200 MG: 100 TABLET ORAL at 09:05

## 2018-09-07 ASSESSMENT — PAIN SCALES - GENERAL: PAINLEVEL_OUTOF10: 10

## 2018-09-07 NOTE — PROGRESS NOTES
[facility]    Progress Note    9/7/2018    8:38 AM    Name:   Kirsten Dubois  MRN:     [id number]     Acct:      [de-identified]   Room:   [address] [place] [address] Day:  25  Admit Date:  8/20/2018  1:21 AM    PCP:   Chivo Macdonald DO  Code Status:  Full Code    Subjective:     C/C:   Chief Complaint   Patient presents with    Emesis    Nausea     Interval History Status: not changed. Patient fairly well today. Denies any chest pain, shortness of breath, nausea or vomiting. Awaiting decision regarding rehab placement    Brief History:      This is 68-year-old white female who is noted with a complaint of nausea, vomiting and diarrhea and found have acute kidney injury and hypotension.  She is admitted into the intensive care unit and placed on IV fluids and Levophed drip.  She has recently been on Bactrim as an outpatient for urinary tract infection.     On the evening of August 22, 2018 she developed shortness of breath and respiratory distress requiring BiPAP placement.  Chest x-ray August 23 showing pulmonary edema.  IV fluids were discontinued     On the evening of August 23 round 6:30 PM she developed rapid atrial fibrillation and was placed on Cardizem drip as well as heparin drip. cardiac enzymes are negative.  Throughout the night she converted to sinus rhythm and Cardizem drip was weaned off.  She's had recurrent episodes of atrial fibrillation with rapid response and cardiology has increased her amiodarone to 200 mg twice a day    Review of Systems:     Constitutional:  negative for chills, fevers, sweats  Respiratory:  negative for cough, dyspnea on exertion, shortness of breath, wheezing  Cardiovascular:  negative for chest pain, chest pressure/discomfort, lower extremity edema, palpitations  Gastrointestinal:  negative for abdominal pain, constipation, diarrhea, nausea, vomiting  Neurological:  negative for dizziness, headache    Medications: Allergies: Allergies   Allergen Reactions    Dye [Barium-Containing Compounds] Other (See Comments)     Cause Afib per     Pcn [Penicillins] Itching and Swelling    Bactrim [Sulfamethoxazole-Trimethoprim] Other (See Comments)     Allergic Nephritis    Red Dye Itching and Rash     This allergy is likely incorrect:  Per patient's  she has no issue with medications that have red dye in them or red food.  He thinks this reaction was added to chart when the pt had a colonoscopy (possibly barium or iodine dye instead)       Current Meds:   Scheduled Meds:    furosemide  40 mg Oral BID    predniSONE  20 mg Oral Daily    insulin lispro  0-18 Units Subcutaneous TID WC    insulin lispro  0-9 Units Subcutaneous Nightly    vitamin D  2,000 Units Oral Daily    fluconazole  200 mg Oral Daily    diltiazem  120 mg Oral Daily    magnesium oxide  400 mg Oral BID    polyethylene glycol  17 g Oral Daily    pantoprazole  40 mg Oral BID AC    metoprolol succinate  25 mg Oral Daily    ipratropium-albuterol  1 ampule Inhalation TID    calcitRIOL  0.25 mcg Oral Daily    amiodarone  200 mg Oral BID    aspirin  81 mg Oral Daily    atorvastatin  20 mg Oral Nightly    mometasone-formoterol  2 puff Inhalation BID    carbidopa-levodopa  1 tablet Oral 4x Daily    conjugated estrogens  0.5 g Vaginal Every Other Day    ferrous sulfate  325 mg Oral BID WC    rasagiline mesylate  1 tablet Oral Daily    rOPINIRole  2 mg Oral TID    senna  2 tablet Oral Daily    sodium chloride flush  10 mL Intravenous 2 times per day    linagliptin  5 mg Oral Daily    sodium chloride flush  10 mL Intravenous 2 times per day    miconazole   Topical BID     Continuous Infusions:    dextrose       PRN Meds: melatonin, LORazepam, morphine, diatrizoate meglumine-sodium, levalbuterol, metoprolol, acetaminophen, sodium chloride flush, glucose, dextrose, glucagon (rDNA), dextrose, ondansetron **OR** ondansetron, sodium chloride flush, promethazine    Data:     Past Medical History:   has a past medical history of Acute on chronic diastolic congestive heart failure ([facility] Anesthesia complication; Asthma; Atrial fibrillation ([facility] Cataracts, bilateral; Cellulitis; Cerebral artery occlusion with cerebral infarction ([facility] Chronic kidney disease; Constipation; COPD (chronic obstructive pulmonary disease) ([facility] Difficult intravenous access; Diverticulosis; DM2 (diabetes mellitus, type 2) ([facility] Full dentures; GERD (gastroesophageal reflux disease); Headache(784.0); [place] (hard of hearing); Hyperlipidemia; Hyperplastic polyp of intestine; Hypertension; Impaired ambulation; Kidney stone; Morbid obesity with BMI of 40.0-44.9, adult ([facility] ECTOR on CPAP; Osteoarthritis; Parkinson disease ([facility] Restless leg syndrome; Spinal stenosis in cervical region; Type II or unspecified type diabetes mellitus without mention of complication, not stated as uncontrolled; Unspecified sleep apnea; Urethral caruncle; and Wears glasses. Social History:   reports that she quit smoking about 47 years ago. Her smoking use included Cigarettes. She has a 30.00 pack-year smoking history. She has never used smokeless tobacco. She reports that she drinks about 1.2 oz of alcohol per week . She reports that she does not use drugs. Family History:   Family History   Problem Relation Age of Onset    Heart Failure Mother     Hypertension Mother     Heart Disease Mother     High Blood Pressure Mother        Vitals:  BP (!) 127/51   Pulse 68   Temp 98.4 °F (36.9 °C) (Oral)   Resp 20   Ht 5' 1\" (1.549 m)   Wt 177 lb 8 oz (80.5 kg)   LMP 2004 (Within Years)   SpO2 100%   BMI 33.54 kg/m²   Temp (24hrs), Av.9 °F (36.6 °C), Min:97.5 °F (36.4 °C), Max:98.4 °F (36.9 °C)    Recent Labs      18   0752  18   1142  18   1655  18   2123   POCGLU  126*  235*  354*  331*       I/O (24Hr):     Intake/Output Summary (Last 24 hours) at

## 2018-09-07 NOTE — PLAN OF CARE
Problem: Serum Glucose Level - Abnormal:  Intervention: Monitor blood glucose measurement  Pt has elevated HS blood sugar and sliding scale coverage given. Educated on diabetic diet, blood sugar has been elevated all day. Verbalized understanding of education, pt will need further education.  Will continue to monitor       Comments: Treatment plan discussed with patient

## 2018-09-07 NOTE — PROGRESS NOTES
creatinine is 2.0  today. Creatinine is 1.8 mg/dL and probably this is her baseline   2. Hypertension: Blood pressure is under fair control  3. Hypernatremia,resolved. 4.  Hyperkalemia secondary to HPI, recent Bactrim use. Potassium is now normal with improvement in renal function and diuretic use  5. Pulmonary edema. Patient is still has scattered crackles L last x-ray shows pleural effusions. 7. Anemia of chronic disease: And on Aranesp  PLAN      1. Continue Lasix  2. Okay to DC from renal standpoint  3. Follow-up appointment with Dr. Erica Lawson  Please do not hesitate to call with questions.     Electronically signed by Fuad Shelton MD on 9/7/2018 at 9:02 AM

## 2018-09-07 NOTE — PROGRESS NOTES
Occupational Therapy  Facility/Department: [facility] PROGRESSIVE CARE  Daily Treatment Note  NAME: Jd Parkinson  : 1945  MRN: [id number]    Date of Service: 2018    Discharge Recommendations:  IP Rehab    Patient would benefit from [address] for continued occupational therapy to increase independence with  ADL of bathing, dressing, toileting and grooming. Writer recommending INPATIENT REHAB placement for for activity tolerance and strength which will increase independence with ADL's coordinated with bed mobility and chair transfers. Continued OT INPATIENT REHAB services to address decreased safety awareness with ADL and IADL tasks and for education and increased independence with DME and AE for fall prevention and ec/ws techniques prior to d/c home. Patient Diagnosis(es): The primary encounter diagnosis was Acute renal failure, unspecified acute renal failure type ([address] Diagnoses of Lactic acidemia, Nausea, Chronic anemia, Hypoglycemia, Dehydration, History of drug resistance, SIRS (systemic inflammatory response syndrome) ([address] Acute cystitis with hematuria, and History of cellulitis were also pertinent to this visit. has a past medical history of Acute on chronic diastolic congestive heart failure ([address] Anesthesia complication; Asthma; Atrial fibrillation ([address] Cataracts, bilateral; Cellulitis; Cerebral artery occlusion with cerebral infarction ([address] Chronic kidney disease; Constipation; COPD (chronic obstructive pulmonary disease) ([address] Difficult intravenous access; Diverticulosis; DM2 (diabetes mellitus, type 2) ([address] Full dentures; GERD (gastroesophageal reflux disease); Headache(784.0); [place] (hard of hearing); Hyperlipidemia; Hyperplastic polyp of intestine; Hypertension; Impaired ambulation; Kidney stone; Morbid obesity with BMI of 40.0-44.9, adult ([address] ECTOR on CPAP; Osteoarthritis; Parkinson disease ([address]  Restless leg syndrome; Spinal stenosis in cervical region; Type II or Contact guard assistance  Additional Comments: Pt completed bathing and dressing while seated on shower bench and standing at RW. SOB noted with VC for pursed lip breathing, assist with washing back, donning hospital gown, and assist with reaching feet for ADLs. Balance  Sitting Balance: Stand by assistance  Standing Balance: Contact guard assistance  Standing Balance  Sit to stand: Contact guard assistance  Stand to sit: Contact guard assistance  Comment: Pt demos SOB after functional mobility requiring VC's for proper breathing techniques  Functional Mobility  Functional - Mobility Device: Rolling Walker  Activity: To/from bathroom  Assist Level: Contact guard assistance  Functional Mobility Comments: Pt appears SOB, but O2 levels at 95%/ Pt does fatigue easily however and requires cues to take break and to sit back down. Toilet Transfers  Toilet - Technique: Ambulating  Equipment Used: Grab bars  Toilet Transfer: Contact guard assistance  Toilet Transfers Comments: With use of RW  Shower Transfers  Shower - Transfer From: Ragini Givens - Transfer Type: To and From  Shower - Transfer To:  Shower seat with back  Shower - Technique: Ambulating  Shower Transfers: Contact Guard;Verbal cues  Shower Transfers Comments: VC for safety and tech     Transfers  Sit to stand: Contact guard assistance  Stand to sit: Contact guard assistance  Attendance  Participation: Active participation                    Cognition  Overall Cognitive Status: Exceptions  Safety Judgement: Decreased awareness of need for safety;Decreased awareness of need for assistance  Problem Solving: Assistance required to generate solutions;Assistance required to implement solutions;Decreased awareness of errors;Assistance required to correct errors made  Insights: Decreased awareness of deficits     Perception  Overall Perceptual Status: [facility]        Additional Activities Comment  Additional Activities:      Upon writer exit, call light within

## 2018-09-07 NOTE — PROGRESS NOTES
Good;-  Comments: standing balance assessed with RW. Other exercises  Other exercises?: Yes  Other exercises 1: dynamic balance/reaching activity x 2                        Assessment   Body structures, Functions, Activity limitations: Decreased functional mobility ; Decreased endurance  Assessment: Pt has made great improvement working with therapy. Pt is progressing and is able to tolerate advanced therapy sessions provided from inpatient rehab stay. Pt has met goal #1,2, and #4 at this time. Pt needs INPATIENT rehab to continue progress. Prognosis: Good  Decision Making: Medium Complexity  REQUIRES PT FOLLOW UP: Yes  Activity Tolerance  Activity Tolerance: Pt motivated     G-Code     OutComes Score                                                    AM-PAC Score  AM-PAC Inpatient Mobility Raw Score : 19  AM-PAC Inpatient T-Scale Score : 45.44  Mobility Inpatient CMS 0-100% Score: 41.77  Mobility Inpatient CMS G-Code Modifier : CK          Goals  Short term goals  Time Frame for Short term goals: 12 tx's  Short term goal 1: Bed mobiity independent   Short term goal 2: Pt transfer independently  Short term goal 3: Pt amb 100 ft with rw nd Patsy   Short term goal 4: Pt particpate in 30  minutes ther ex to increase strength for function  Patient Goals   Patient goals : Pt goal is to keep her strength up and go home    Plan    Plan  Times per week: 1-2x/day. 5-6x/week.    Specific instructions for Next Treatment: progress gait/balance  Current Treatment Recommendations: Strengthening, ROM, Safety Education & Training, Positioning, Patient/Caregiver Education & Training, Home Exercise Program  Safety Devices  Type of devices: Left in chair, Gait belt, Call light within reach  Restraints  Initially in place: No     Therapy Time   Individual Concurrent Group Co-treatment   Time In 1400         Time Out 1430         Minutes [address] [place] [address], Student Physical Therapist Assistant

## 2018-09-07 NOTE — DISCHARGE SUMMARY
[facility]    Discharge Summary     Patient ID: Darryle Buggy  :  1945   MRN: [id number]     ACCOUNT:  [de-identified]   Patient's PCP: Alex Davey DO  Admit Date: 2018   Discharge Date: 2018     Length of Stay: 18  Code Status:  Full Code  Admitting Physician: Adan Solorzano DO  Discharge Physician: Adan Solorzano DO     Active Discharge Diagnoses:     Hospital Problem Lists:  Principal Problem:    Acute kidney injury superimposed on chronic kidney disease ([address]  Active Problems:    Controlled type 2 diabetes mellitus with stage 3 chronic kidney disease, without long-term current use of insulin ([address]    Essential hypertension    Chronic atrial fibrillation (HCC)    ECTOR on CPAP    Hypomagnesemia    Rapid atrial fibrillation ([address]    CKD stage 3 due to type 2 diabetes mellitus (HCC)    Anemia of chronic renal failure, stage 4 (severe) (HCC)    SIRS (systemic inflammatory response syndrome) (HCC)    Nausea and vomiting in adult    Bacterial UTI    Dehydration    Acute hypoxemic respiratory failure (HCC)    Hypovolemic shock (HCC)    Thrombocytopenia (HCC)    Odynophagia    Esophageal dysphagia    Candida esophagitis ([address]  Resolved Problems:    * No resolved hospital problems. *      Admission Condition:  fair     Discharged Condition: stable    Hospital Stay:     Hospital Course:   Darryle Buggy is a 72 y.o. female who was admitted for the management of   Acute kidney injury superimposed on chronic kidney disease ([address] , presented to ER with Emesis and Nausea    This is 53-year-old white female who is noted with a complaint of nausea, vomiting and diarrhea and found have acute kidney injury and hypotension. Nam Mcdonough is admitted into the intensive care unit and placed on IV fluids and Levophed drip.  She has recently been on Bactrim as an outpatient for urinary tract infection.     On the evening of 2018 she developed shortness of breath and respiratory distress requiring BiPAP placement.  Chest x-ray August 23 showing pulmonary edema.  IV fluids were discontinued     On the evening of August 23 round 6:30 PM she developed rapid atrial fibrillation and was placed on Cardizem drip as well as heparin drip. cardiac enzymes are negative.  Throughout the night she converted to sinus rhythm and Cardizem drip was weaned off.  She's had recurrent episodes of atrial fibrillation with rapid response and cardiology has increased her amiodarone to 200 mg twice a day. She continued to have episodes of shortness of breath requiring BiPAP with her episodes of atrial fibrillation. With titration of medications or heart rate was controlled with the amiodarone in addition to Cardizem. She was evaluated for LTAC which was declined and ultimately evaluated for acute inpatient rehab.   Ultimately rehab was approved on the date of September 7 stable for discharge to rehab      Significant therapeutic interventions: As above    Significant Diagnostic Studies:   Labs / Micro:  CBC:   Lab Results   Component Value Date    WBC 12.4 09/02/2018    RBC 2.50 09/02/2018    HGB 8.3 09/02/2018    HCT 25.9 09/02/2018    .5 09/02/2018    MCH 33.1 09/02/2018    MCHC 32.0 09/02/2018    RDW 15.8 09/02/2018     09/06/2018     BMP:    Lab Results   Component Value Date    GLUCOSE 153 09/07/2018    GLUCOSE 132 03/07/2012     09/07/2018    K 4.0 09/07/2018    CL 93 09/07/2018    CO2 38 09/07/2018    ANIONGAP 12 09/07/2018    BUN 35 09/07/2018    CREATININE 1.81 09/07/2018    BUNCRER 19 09/07/2018    CALCIUM 7.3 09/07/2018    LABGLOM 28 09/07/2018    GFRAA 33 09/07/2018    GFR      09/07/2018    GFR NOT REPORTED 09/07/2018          Radiology:  Ct Abdomen Pelvis Wo Contrast    Result Date: 8/20/2018  EXAMINATION: CT OF THE ABDOMEN AND PELVIS WITHOUT CONTRAST 8/20/2018 3:15 am TECHNIQUE: CT of the abdomen and pelvis was performed without the administration of intravenous contrast. Multiplanar reformatted images are provided for review. Dose modulation, iterative reconstruction, and/or weight based adjustment of the mA/kV was utilized to reduce the radiation dose to as low as reasonably achievable. COMPARISON: 03/08/2018 HISTORY: ORDERING SYSTEM PROVIDED HISTORY: nausea, lactic >10 FINDINGS: Lower Chest: The heart is mildly enlarged. There are cardiac calcifications including calcific coronary artery disease. There are few bands of bibasilar atelectasis. Organs: The liver, spleen, pancreas and adrenal glands are grossly normal with the limitations of a noncontrast study. There are bilateral nonobstructing renal calculi measuring up to 1.2 cm in length in the right kidney. No acute obstructive uropathy. There is high attenuation layering in the dependent portion of the gallbladder. This may represent thick bile/sludge. Small gallstones not excluded. GI/Bowel: There is a normal retrocecal appendix. There is some high attenuation material within the stomach and duodenum. This may represent ingested medication. Unopacified bowel loops are otherwise unremarkable. No evidence of bowel obstruction. Pelvis: Urinary bladder is unremarkable. There are unchanged calcified uterine fibroids. Peritoneum/Retroperitoneum: Calcific aorto iliac atherosclerotic disease with no evidence of an aneurysm. No adenopathy, free air or free fluid. Bones/Soft Tissues: There are multilevel degenerative changes in the lower thoracic and lumbar spine. There is heterogeneous bone density in the lower thoracic and lumbar spine and pelvis. No acute bone finding. No definite acute finding in the abdomen or pelvis. There are bilateral nonobstructing renal calculi measuring up to 1.2 cm in length in the right kidney. High attenuation layering in the dependent portion of the gallbladder, likely thick bile/sludge. Small gallstones cannot be excluded.   Follow-up gallbladder ultrasound would be helpful. Mild fibroid uterus. Xr Abdomen (kub) (single Ap View)    Result Date: 8/28/2018  EXAMINATION: SINGLE SUPINE XRAY VIEW(S) OF THE ABDOMEN 8/28/2018 3:17 am COMPARISON: None. HISTORY: ORDERING SYSTEM PROVIDED HISTORY: emisis TECHNOLOGIST PROVIDED HISTORY: emisis FINDINGS: Nonspecific bowel gas pattern without evidence of obstruction. Round calcifications overlie the left renal shadow. Right kidney obscured by stool and bowel gas. Assessment of free air is limited on the supine view. 1. No evidence of bowel obstruction 2. Calcified densities overlie the left abdomen suggestive of renal stones. Ct Chest Wo Contrast    Result Date: 8/28/2018  EXAMINATION: CT OF THE CHEST WITHOUT CONTRAST 8/28/2018 2:50 pm TECHNIQUE: CT of the chest was performed without the administration of intravenous contrast. Multiplanar reformatted images are provided for review. Dose modulation, iterative reconstruction, and/or weight based adjustment of the mA/kV was utilized to reduce the radiation dose to as low as reasonably achievable. COMPARISON: None. HISTORY: ORDERING SYSTEM PROVIDED HISTORY: Pulmonary edema/pleural effusions FINDINGS: Mediastinum: Thoracic aorta demonstrates moderate calcification without aneurysm. Pulmonary trunk appears nondilated. Cardiomegaly is present without pericardial effusion. Several prominent mediastinal lymph nodes are noted. The esophagus appears to be distended and filled with oral contrast/ingested food material to the level of the proximal esophagus with a moderate size hiatal hernia noted. Lungs/pleura: Moderate size bilateral pleural effusions are noted with consolidative changes involving the lower lobes. There is near complete consolidation involving the right lower lobe. In addition, there are ground-glass /bronchovascular opacities involving the aerated upper lobes with smooth septal thickening noted. No pneumothorax. No suspicious noncalcified lung nodule or mass. pneumothorax. DJD spine. Continued interval improvement mild CHF and bibasilar atelectasis probable small effusions. Minimal infiltrate again a possibility. Xr Chest Portable    Result Date: 9/1/2018  EXAMINATION: SINGLE XRAY VIEW OF THE CHEST 9/1/2018 6:26 am COMPARISON: August 31 HISTORY: ORDERING SYSTEM PROVIDED HISTORY: infiltrate TECHNOLOGIST PROVIDED HISTORY: infiltrate Ordering Physician Provided Reason for Exam: infiltrate Acuity: Unknown Type of Exam: Unknown FINDINGS: Right upper extremity PICC line tip terminates over the SVC region, unchanged. Postoperative changes in the lower cervical spine are noted. Heart size is enlarged, unchanged. Multifocal mid to lower lung zone airspace disease is again noted. This is improved from the prior. There is better visualization of the diaphragm. Bilateral pleural effusions are noted. There is no pneumothorax. Improving multifocal airspace disease bilaterally and bilateral effusions. Xr Chest Portable    Result Date: 8/31/2018  EXAMINATION: SINGLE XRAY VIEW OF THE CHEST 8/31/2018 6:19 am COMPARISON: 08/30/2018 HISTORY: ORDERING SYSTEM PROVIDED HISTORY: infiltrate TECHNOLOGIST PROVIDED HISTORY: infiltrate FINDINGS: Right PICC line is stable. Stable right lower lobe airspace disease is identified. There remains bilateral pleural effusions. The heart is enlarged. There is no evidence of pneumothorax. No acute osseous abnormality is seen. Stable exam.     Xr Chest Portable    Result Date: 8/30/2018  EXAMINATION: SINGLE XRAY VIEW OF THE CHEST 8/30/2018 5:10 am COMPARISON: 8/29/2018 HISTORY: ORDERING SYSTEM PROVIDED HISTORY: infiltrate TECHNOLOGIST PROVIDED HISTORY: infiltrate Acuity: Acute Type of Exam: Unknown FINDINGS: Right PICC line is identified with the tip in the SVC. There is moderate to severe cardiomegaly. Worsening right lower lobe airspace disease is identified. Pulmonary vascular congestion remains.   Small bilateral pleural effusions have slightly increased. Progressive opacification of the right lower lobe likely representing worsening pleural effusion and airspace disease. Small left pleural effusion which has slightly increased. Cardiomegaly and pulmonary vascular congestion compatible with CHF. Xr Chest Portable    Result Date: 8/29/2018  EXAMINATION: SINGLE XRAY VIEW OF THE CHEST 8/29/2018 5:52 am COMPARISON: 08/28/2018 HISTORY: ORDERING SYSTEM PROVIDED HISTORY: infiltrate TECHNOLOGIST PROVIDED HISTORY: infiltrate Acuity: Acute Type of Exam: Unknown FINDINGS: Cardiomegaly. No mediastinal widening. Right PICC line terminates in SVC. Bilateral small pleural effusions with possible subsegmental atelectasis slightly improved from prior. Minor vascular congestion stable. No pneumothorax. Bones grossly intact. Opacities representing small effusion and subsegmental atelectasis shows slight improvement. Mild vascular congestion stable axis cardiomegaly. Xr Chest Portable    Result Date: 8/28/2018  EXAMINATION: SINGLE XRAY VIEW OF THE CHEST 8/28/2018 1:03 am COMPARISON: 08/25/2018 HISTORY: ORDERING SYSTEM PROVIDED HISTORY: crackles t/o TECHNOLOGIST PROVIDED HISTORY: crackles t/o FINDINGS: Moderate right lower lobe airspace disease is identified which has progressed. Stable small left pleural effusion is seen. There is clearing of the left upper lobe airspace disease. Mild cardiomegaly is noted. No acute osseous abnormality is seen. There is no evidence of pneumothorax. There remains a right PICC line with the tip in the SVC. Slight progression of the right lower lobe airspace disease. Clearing of left upper lobe airspace disease process. Stable small left pleural effusion. Xr Chest Portable    Result Date: 8/25/2018  EXAMINATION: SINGLE XRAY VIEW OF THE CHEST 8/25/2018 9:26 am COMPARISON: Chest August 23, 2018.  HISTORY: ORDERING SYSTEM PROVIDED HISTORY: dyspnea TECHNOLOGIST PROVIDED HISTORY: dyspnea thick substance there was no penetration or aspiration. With the soft solid substance, there was flash penetration without aspiration. 1. Flash penetration without aspiration with the thick liquid, thin liquid, and soft solid substances. 2. No penetration or aspiration with the pureed/pudding thick substance. Please see separate speech pathology report for full discussion of findings and recommendations. Fl Modified Barium Swallow W Video    Result Date: 8/28/2018  EXAMINATION: MODIFIED BARIUM SWALLOW WAS PERFORMED IN CONJUNCTION WITH SPEECH PATHOLOGY SERVICES TECHNIQUE: Fluoroscopic evaluation of the swallowing mechanism was performed with multiple consistency of barium product. FLUOROSCOPY DOSE AND TYPE OR TIME AND EXPOSURES: 23.69 Gy cm2, 1.2 minutes of fluoroscopy time COMPARISON: Esophagram from 08/28/2018 HISTORY: ORDERING SYSTEM PROVIDED HISTORY: dysphagia, failed esophagram odynophagia TECHNOLOGIST PROVIDED HISTORY: dysphagia, failed esophagram odynophagia with speech Ordering Physician Provided Reason for Exam: dysphagia Acuity: Unknown Type of Exam: Unknown 72-year-old female with dysphagia, odynophagia and failed esophagram FINDINGS: Water-soluble contrast was used due to the patient's \"barium allergy\" . There is evidence of prior spinal fusion in the lower cervical spine. With the thick liquid substance, there was penetration followed by aspiration. With the thin liquid substance, there was penetration followed by aspiration and coughing. With the pureed/pudding thick substance, there was penetration followed by aspiration. The soft solid and cookie solid substances were not attempted. 1. Penetration followed by aspiration with the thick liquid, thin liquid, and pureed/pudding thick substances. 2. Soft solid and cookie solid substances were not attempted. Please see separate speech pathology report for full discussion of findings and recommendations.          Consultations:    Consults:

## 2018-09-07 NOTE — PROGRESS NOTES
Pulmonary Critical Care Progress Note  Hammad Thompson M.D. Patient seen for the follow up of Acute on chronic respiratory failure, pulmonary edema/pleural effusions, diastolic heart failure, exacerbation COPD     Subjective:  She is sitting up in the chair. No acute events noted overnight. Her shortness of breath is very minimal. She has occasional cough without significant sputum production. She is hoping for discharge soon. Examination:  Vitals: BP (!) 127/51   Pulse 68   Temp 98.4 °F (36.9 °C) (Oral)   Resp 20   Ht 5' 1\" (1.549 m)   Wt 177 lb 8 oz (80.5 kg)   LMP 04/03/2004 (Within Years)   SpO2 100%   BMI 33.54 kg/m²     General appearance: alert and cooperative with exam, sitting up in chair   Neck: No JVD  Lungs: Moderate air exchange, rales, no wheeze  Heart: regular rate and rhythm, S1, S2 normal, no gallop  Abdomen: Soft, non tender, + BS  Extremities: no cyanosis or clubbing.  No significant edema    LABs:  CBC:   Recent Labs      09/06/18   0604   PLT  243     BMP:   Recent Labs      09/06/18   0604  09/07/18   0515   NA  142  143   K  3.7  4.0   CO2  37*  38*   BUN  37*  35*   CREATININE  1.80*  1.81*   LABGLOM  28*  28*   GLUCOSE  123*  153*     Radiology:  9/5/18      Impression:  · Acute on chronic respiratory failure  · Pulmonary edema/bilateral pleural effusion/diastolic heart failure  · Acute exacerbation of COPD  · 30-pack-year smoking  · Dysphagia/aspiration,?  Aspiration pneumonitis versus pneumonia  · Obstructive sleep apnea/obesity  · KRISTIN on Chronic kidney disease  · A. fib with RVR  · HDL/HTN/DM     Recommendations:  · O2 2 liters NC  · Off antibiotics (Levaquin and Clinda), still on Diflucan  · Albuterol and Ipratropium Q 4 hours and prn  · Dulera 200  · Diuresis Per Nephrology following   · Prednisone taper  · Continue dysphagia diet  · PT/OT  · Follow-up CT scan of the chest in 6-8 weeks  · DVT prophylaxis with EPCs  · Incentive spirometry every hour while awake  · Discharge planning to rehab, awaiting pre-CERT    Electronically signed by     Herberth Ponce MD on 9/7/2018 at 11:55 AM  Pulmonary Critical Care and Sleep Medicine,  [facility]  Cell: [phone]  Office: [phone]

## 2018-09-07 NOTE — PROGRESS NOTES
Cardiovascular progress Note          Patient name: Yamilet Fitzpatrick    YOB: 1945  Date of admission:  8/20/2018       Patient seen, examined. Previous clinical entries reviewed. All available laboratory, imaging and ancillary data reviewed. Subjective:      No new complaints. She denies any,orthopnea or PND. Systems review:  Constitutional: No fever/chills. HENT: No headache, neck pain or neck stiffness. No sore throat or dysphagia. Gastrointestinal: No abdominal pain, nausea or vomiting. Cardiac: As Above  Respiratory: As above  Neurologic: No new focal weakness or numbness  Psychiatric: Normal mood and mentation       Examination:   Vitals: BP (!) 127/51   Pulse 68   Temp 98.4 °F (36.9 °C) (Oral)   Resp 20   Ht 5' 1\" (1.549 m)   Wt 177 lb 8 oz (80.5 kg)   LMP 04/03/2004 (Within Years)   SpO2 100%   BMI 33.54 kg/m²     Intake/Output Summary (Last 24 hours) at 09/07/18 0826  Last data filed at 09/07/18 0601   Gross per 24 hour   Intake              200 ml   Output                0 ml   Net              200 ml       General appearance: Comfortable in no apparent distress. HEENT: + pallor. No icterus  Neck: Supple. Lungs:Generally decreased breath sounds. Heart: S1,S2  Abdomen: Soft  Extremities: + peripheral edema  Skin: No obvious rashes. Musculoskeletal: No obvious deformities. Neurologic: No focal deficits.      Labs/ Ancillary data:     CBC:   Recent Labs      09/06/18   0604   PLT  243     BMP:    Recent Labs      09/05/18   0519  09/06/18   0604  09/07/18   0515   NA  139  142  143   K  3.8  3.7  4.0   CL  90*  93*  93*   CO2  39*  37*  38*   BUN  42*  37*  35*   CREATININE  2.09*  1.80*  1.81*   GLUCOSE  194*  123*  153*       Medications:   Scheduled Meds:   furosemide  40 mg Oral BID    predniSONE  20 mg Oral Daily    insulin lispro  0-18 Units Subcutaneous TID WC    insulin lispro  0-9 Units Subcutaneous Nightly    vitamin D  2,000 Units Oral Daily   

## 2018-09-07 NOTE — CARE COORDINATION
Discharge planning    Dr Reyna Bray did complete [facility] at 1000 and they did request updated OT notes which were faxed at [phone] am    Call to [employer] to [employer] at [phone] to inquire about appeal. Was told that company has 3 days to respond. .    Ask to speak to a supervisor as this patient is ready to be dc and wanted to inquire if able to assist any further. Spoke with Crescencio Olson RN case manager and she did request to have today PT and OT sent to her. Faxed the above    Call from [facility] and patient will be approved. They are notifying [facility] rehab.  Notified social work

## 2018-09-07 NOTE — PLAN OF CARE
Problem: Falls - Risk of:  Goal: Will remain free from falls  Will remain free from falls   Outcome: Ongoing  Hx of falls. Continues on fall precautions. Uses call light appropriately. Possible discharge to skilled nursing later today.

## 2018-09-10 ENCOUNTER — HOSPITAL ENCOUNTER (OUTPATIENT)
Facility: MEDICAL CENTER | Age: 73
End: 2018-09-10
Payer: COMMERCIAL

## 2018-09-14 ENCOUNTER — TELEPHONE (OUTPATIENT)
Dept: FAMILY MEDICINE CLINIC | Age: 73
End: 2018-09-14

## 2018-09-15 ENCOUNTER — PATIENT MESSAGE (OUTPATIENT)
Dept: FAMILY MEDICINE CLINIC | Age: 73
End: 2018-09-15

## 2018-09-17 RX ORDER — TIOTROPIUM BROMIDE INHALATION SPRAY 3.12 UG/1
2 SPRAY, METERED RESPIRATORY (INHALATION) DAILY
Qty: 3 INHALER | Refills: 3 | Status: ON HOLD | OUTPATIENT
Start: 2018-09-17 | End: 2018-12-05 | Stop reason: HOSPADM

## 2018-09-17 NOTE — TELEPHONE ENCOUNTER
From: Jd Guillen  To: Faustino Quiroga DO  Sent: 9/15/2018 2:23 PM EDT  Subject: Prescription Question    Hi doctor Sergio. Abigail need a 90 day supply of Spiriva inhaler for her breathing from [facility]. Thank you.

## 2018-09-18 ENCOUNTER — PATIENT MESSAGE (OUTPATIENT)
Dept: FAMILY MEDICINE CLINIC | Age: 73
End: 2018-09-18

## 2018-09-20 ENCOUNTER — TELEPHONE (OUTPATIENT)
Dept: FAMILY MEDICINE CLINIC | Age: 73
End: 2018-09-20

## 2018-09-22 ENCOUNTER — HOSPITAL ENCOUNTER (INPATIENT)
Age: 73
LOS: 4 days | Discharge: NURSING, REHAB OR LONG-TERM CARE | DRG: 872 | End: 2018-09-27
Attending: EMERGENCY MEDICINE | Admitting: FAMILY MEDICINE
Payer: COMMERCIAL

## 2018-09-22 ENCOUNTER — APPOINTMENT (OUTPATIENT)
Dept: GENERAL RADIOLOGY | Age: 73
DRG: 872 | End: 2018-09-22
Payer: COMMERCIAL

## 2018-09-22 DIAGNOSIS — M79.604 CHRONIC PAIN OF RIGHT LOWER EXTREMITY: ICD-10-CM

## 2018-09-22 DIAGNOSIS — N30.00 ACUTE CYSTITIS WITHOUT HEMATURIA: Primary | ICD-10-CM

## 2018-09-22 DIAGNOSIS — F41.9 ANXIETY: ICD-10-CM

## 2018-09-22 DIAGNOSIS — G89.29 CHRONIC PAIN OF RIGHT LOWER EXTREMITY: ICD-10-CM

## 2018-09-22 LAB
-: ABNORMAL
ABSOLUTE EOS #: 0.1 K/UL (ref 0–0.4)
ABSOLUTE IMMATURE GRANULOCYTE: ABNORMAL K/UL (ref 0–0.3)
ABSOLUTE LYMPH #: 0.4 K/UL (ref 1–4.8)
ABSOLUTE MONO #: 0.1 K/UL (ref 0.2–0.8)
ALBUMIN SERPL-MCNC: 4.2 G/DL (ref 3.5–5.2)
ALBUMIN/GLOBULIN RATIO: ABNORMAL (ref 1–2.5)
ALP BLD-CCNC: 80 U/L (ref 35–104)
ALT SERPL-CCNC: 7 U/L (ref 5–33)
AMORPHOUS: ABNORMAL
ANION GAP SERPL CALCULATED.3IONS-SCNC: 16 MMOL/L (ref 9–17)
AST SERPL-CCNC: 19 U/L
BACTERIA: ABNORMAL
BASOPHILS # BLD: 0 % (ref 0–2)
BASOPHILS ABSOLUTE: 0 K/UL (ref 0–0.2)
BILIRUB SERPL-MCNC: 0.26 MG/DL (ref 0.3–1.2)
BILIRUBIN URINE: NEGATIVE
BUN BLDV-MCNC: 46 MG/DL (ref 8–23)
BUN/CREAT BLD: 23 (ref 9–20)
CALCIUM SERPL-MCNC: 10.1 MG/DL (ref 8.6–10.4)
CASTS UA: ABNORMAL /LPF
CHLORIDE BLD-SCNC: 98 MMOL/L (ref 98–107)
CO2: 31 MMOL/L (ref 20–31)
COLOR: YELLOW
COMMENT UA: ABNORMAL
CREAT SERPL-MCNC: 1.99 MG/DL (ref 0.5–0.9)
CRYSTALS, UA: ABNORMAL /HPF
DIFFERENTIAL TYPE: ABNORMAL
EKG ATRIAL RATE: 85 BPM
EKG Q-T INTERVAL: 378 MS
EKG QRS DURATION: 86 MS
EKG QTC CALCULATION (BAZETT): 454 MS
EKG R AXIS: -33 DEGREES
EKG T AXIS: 68 DEGREES
EKG VENTRICULAR RATE: 87 BPM
EOSINOPHILS RELATIVE PERCENT: 1 % (ref 1–4)
EPITHELIAL CELLS UA: ABNORMAL /HPF (ref 0–5)
FIO2: 27
GFR AFRICAN AMERICAN: 30 ML/MIN
GFR NON-AFRICAN AMERICAN: 25 ML/MIN
GFR SERPL CREATININE-BSD FRML MDRD: ABNORMAL ML/MIN/{1.73_M2}
GFR SERPL CREATININE-BSD FRML MDRD: ABNORMAL ML/MIN/{1.73_M2}
GLUCOSE BLD-MCNC: 156 MG/DL (ref 65–105)
GLUCOSE BLD-MCNC: 157 MG/DL (ref 70–99)
GLUCOSE URINE: NEGATIVE
HCT VFR BLD CALC: 31 % (ref 36–46)
HEMOGLOBIN: 10 G/DL (ref 12–16)
IMMATURE GRANULOCYTES: ABNORMAL %
KETONES, URINE: NEGATIVE
LACTIC ACID: 2.6 MMOL/L (ref 0.5–2.2)
LEUKOCYTE ESTERASE, URINE: ABNORMAL
LYMPHOCYTES # BLD: 5 % (ref 24–44)
MCH RBC QN AUTO: 33.2 PG (ref 26–34)
MCHC RBC AUTO-ENTMCNC: 32.4 G/DL (ref 31–37)
MCV RBC AUTO: 102.4 FL (ref 80–100)
MONOCYTES # BLD: 1 % (ref 1–7)
MUCUS: ABNORMAL
NEGATIVE BASE EXCESS, ART: ABNORMAL (ref 0–2)
NITRITE, URINE: POSITIVE
NRBC AUTOMATED: ABNORMAL PER 100 WBC
O2 DEVICE/FLOW/%: ABNORMAL
OTHER OBSERVATIONS UA: ABNORMAL
PATIENT TEMP: 37
PDW BLD-RTO: 15.9 % (ref 11.5–14.5)
PH UA: 5.5 (ref 5–8)
PLATELET # BLD: 82 K/UL (ref 130–400)
PLATELET ESTIMATE: ABNORMAL
PMV BLD AUTO: 9.4 FL (ref 6–12)
POC HCO3: 36 MMOL/L (ref 22–27)
POC O2 SATURATION: 95 %
POC PCO2 TEMP: ABNORMAL MM HG
POC PCO2: 51 MM HG (ref 32–45)
POC PH TEMP: ABNORMAL
POC PH: 7.46 (ref 7.35–7.45)
POC PO2 TEMP: ABNORMAL MM HG
POC PO2: 73 MM HG (ref 75–95)
POSITIVE BASE EXCESS, ART: 12 (ref 0–2)
POTASSIUM SERPL-SCNC: 4.2 MMOL/L (ref 3.7–5.3)
PROTEIN UA: ABNORMAL
RBC # BLD: 3.03 M/UL (ref 4–5.2)
RBC # BLD: ABNORMAL 10*6/UL
RBC UA: ABNORMAL /HPF (ref 0–2)
RENAL EPITHELIAL, UA: ABNORMAL /HPF
SEG NEUTROPHILS: 93 % (ref 36–66)
SEGMENTED NEUTROPHILS ABSOLUTE COUNT: 7.7 K/UL (ref 1.8–7.7)
SODIUM BLD-SCNC: 145 MMOL/L (ref 135–144)
SPECIFIC GRAVITY UA: 1.02 (ref 1–1.03)
TCO2 (CALC), ART: 38 MMOL/L (ref 23–28)
TOTAL PROTEIN: 6.7 G/DL (ref 6.4–8.3)
TRICHOMONAS: ABNORMAL
TURBIDITY: ABNORMAL
URINE HGB: ABNORMAL
UROBILINOGEN, URINE: NORMAL
WBC # BLD: 8.3 K/UL (ref 3.5–11)
WBC # BLD: ABNORMAL 10*3/UL
WBC UA: ABNORMAL /HPF (ref 0–5)
YEAST: ABNORMAL

## 2018-09-22 PROCEDURE — 36415 COLL VENOUS BLD VENIPUNCTURE: CPT

## 2018-09-22 PROCEDURE — 93005 ELECTROCARDIOGRAM TRACING: CPT

## 2018-09-22 PROCEDURE — 87149 DNA/RNA DIRECT PROBE: CPT

## 2018-09-22 PROCEDURE — 2580000003 HC RX 258: Performed by: EMERGENCY MEDICINE

## 2018-09-22 PROCEDURE — 87804 INFLUENZA ASSAY W/OPTIC: CPT

## 2018-09-22 PROCEDURE — 83605 ASSAY OF LACTIC ACID: CPT

## 2018-09-22 PROCEDURE — 82803 BLOOD GASES ANY COMBINATION: CPT

## 2018-09-22 PROCEDURE — 87088 URINE BACTERIA CULTURE: CPT

## 2018-09-22 PROCEDURE — 84145 PROCALCITONIN (PCT): CPT

## 2018-09-22 PROCEDURE — 80053 COMPREHEN METABOLIC PANEL: CPT

## 2018-09-22 PROCEDURE — 87040 BLOOD CULTURE FOR BACTERIA: CPT

## 2018-09-22 PROCEDURE — 96367 TX/PROPH/DG ADDL SEQ IV INF: CPT

## 2018-09-22 PROCEDURE — 87205 SMEAR GRAM STAIN: CPT

## 2018-09-22 PROCEDURE — 85025 COMPLETE CBC W/AUTO DIFF WBC: CPT

## 2018-09-22 PROCEDURE — 96365 THER/PROPH/DIAG IV INF INIT: CPT

## 2018-09-22 PROCEDURE — 99285 EMERGENCY DEPT VISIT HI MDM: CPT

## 2018-09-22 PROCEDURE — 87186 SC STD MICRODIL/AGAR DIL: CPT

## 2018-09-22 PROCEDURE — 82947 ASSAY GLUCOSE BLOOD QUANT: CPT

## 2018-09-22 PROCEDURE — 6370000000 HC RX 637 (ALT 250 FOR IP): Performed by: EMERGENCY MEDICINE

## 2018-09-22 PROCEDURE — 87086 URINE CULTURE/COLONY COUNT: CPT

## 2018-09-22 PROCEDURE — 2700000000 HC OXYGEN THERAPY PER DAY

## 2018-09-22 PROCEDURE — 81001 URINALYSIS AUTO W/SCOPE: CPT

## 2018-09-22 PROCEDURE — 94761 N-INVAS EAR/PLS OXIMETRY MLT: CPT

## 2018-09-22 PROCEDURE — 94640 AIRWAY INHALATION TREATMENT: CPT

## 2018-09-22 PROCEDURE — 71045 X-RAY EXAM CHEST 1 VIEW: CPT

## 2018-09-22 PROCEDURE — 6360000002 HC RX W HCPCS: Performed by: EMERGENCY MEDICINE

## 2018-09-22 PROCEDURE — 36600 WITHDRAWAL OF ARTERIAL BLOOD: CPT

## 2018-09-22 RX ORDER — IPRATROPIUM BROMIDE AND ALBUTEROL SULFATE 2.5; .5 MG/3ML; MG/3ML
1 SOLUTION RESPIRATORY (INHALATION)
Status: DISCONTINUED | OUTPATIENT
Start: 2018-09-23 | End: 2018-09-23

## 2018-09-22 RX ORDER — SODIUM CHLORIDE 0.9 % (FLUSH) 0.9 %
10 SYRINGE (ML) INJECTION EVERY 12 HOURS SCHEDULED
Status: DISCONTINUED | OUTPATIENT
Start: 2018-09-22 | End: 2018-09-27 | Stop reason: HOSPADM

## 2018-09-22 RX ORDER — IBUPROFEN 600 MG/1
600 TABLET ORAL ONCE
Status: COMPLETED | OUTPATIENT
Start: 2018-09-22 | End: 2018-09-22

## 2018-09-22 RX ORDER — SODIUM CHLORIDE 0.9 % (FLUSH) 0.9 %
10 SYRINGE (ML) INJECTION PRN
Status: DISCONTINUED | OUTPATIENT
Start: 2018-09-22 | End: 2018-09-27 | Stop reason: HOSPADM

## 2018-09-22 RX ADMIN — VANCOMYCIN HYDROCHLORIDE 2000 MG: 1 INJECTION, POWDER, LYOPHILIZED, FOR SOLUTION INTRAVENOUS at 23:19

## 2018-09-22 RX ADMIN — CEFEPIME HYDROCHLORIDE 2 G: 2 INJECTION, POWDER, FOR SOLUTION INTRAVENOUS at 22:42

## 2018-09-22 RX ADMIN — IBUPROFEN 600 MG: 600 TABLET ORAL at 23:19

## 2018-09-22 RX ADMIN — IPRATROPIUM BROMIDE AND ALBUTEROL SULFATE 1 AMPULE: .5; 3 SOLUTION RESPIRATORY (INHALATION) at 20:41

## 2018-09-22 ASSESSMENT — PAIN DESCRIPTION - LOCATION: LOCATION: ABDOMEN

## 2018-09-22 ASSESSMENT — PAIN DESCRIPTION - DESCRIPTORS: DESCRIPTORS: SHARP;STABBING

## 2018-09-22 ASSESSMENT — PAIN SCALES - GENERAL: PAINLEVEL_OUTOF10: 10

## 2018-09-22 ASSESSMENT — PAIN DESCRIPTION - ORIENTATION: ORIENTATION: LEFT

## 2018-09-23 PROBLEM — N39.0 SEPSIS DUE TO URINARY TRACT INFECTION (HCC): Status: ACTIVE | Noted: 2018-09-23

## 2018-09-23 PROBLEM — J96.10 CHRONIC RESPIRATORY FAILURE (HCC): Status: ACTIVE | Noted: 2018-09-23

## 2018-09-23 PROBLEM — A41.9 SEPSIS DUE TO URINARY TRACT INFECTION (HCC): Status: ACTIVE | Noted: 2018-09-23

## 2018-09-23 PROBLEM — I50.32 CHRONIC DIASTOLIC CONGESTIVE HEART FAILURE (HCC): Status: ACTIVE | Noted: 2018-09-23

## 2018-09-23 LAB
ABSOLUTE EOS #: 0 K/UL (ref 0–0.4)
ABSOLUTE IMMATURE GRANULOCYTE: ABNORMAL K/UL (ref 0–0.3)
ABSOLUTE LYMPH #: 0.8 K/UL (ref 1–4.8)
ABSOLUTE MONO #: 0.8 K/UL (ref 0.2–0.8)
ANION GAP SERPL CALCULATED.3IONS-SCNC: 9 MMOL/L (ref 9–17)
BASOPHILS # BLD: 0 % (ref 0–2)
BASOPHILS ABSOLUTE: 0 K/UL (ref 0–0.2)
BUN BLDV-MCNC: 48 MG/DL (ref 8–23)
BUN/CREAT BLD: 21 (ref 9–20)
CALCIUM IONIZED: 1.24 MMOL/L (ref 1.13–1.33)
CALCIUM SERPL-MCNC: 9.1 MG/DL (ref 8.6–10.4)
CHLORIDE BLD-SCNC: 96 MMOL/L (ref 98–107)
CO2: 35 MMOL/L (ref 20–31)
CREAT SERPL-MCNC: 2.31 MG/DL (ref 0.5–0.9)
CREATININE URINE: 38.8 MG/DL (ref 28–217)
DIFFERENTIAL TYPE: ABNORMAL
DIRECT EXAM: NORMAL
EOSINOPHIL,URINE: NORMAL
EOSINOPHILS RELATIVE PERCENT: 1 % (ref 1–4)
GFR AFRICAN AMERICAN: 25 ML/MIN
GFR NON-AFRICAN AMERICAN: 21 ML/MIN
GFR SERPL CREATININE-BSD FRML MDRD: ABNORMAL ML/MIN/{1.73_M2}
GFR SERPL CREATININE-BSD FRML MDRD: ABNORMAL ML/MIN/{1.73_M2}
GLUCOSE BLD-MCNC: 103 MG/DL (ref 65–105)
GLUCOSE BLD-MCNC: 126 MG/DL (ref 70–99)
HCT VFR BLD CALC: 25.1 % (ref 36–46)
HEMOGLOBIN: 8.3 G/DL (ref 12–16)
IMMATURE GRANULOCYTES: ABNORMAL %
LACTIC ACID, SEPSIS WHOLE BLOOD: ABNORMAL MMOL/L (ref 0.5–1.9)
LACTIC ACID, SEPSIS: 2.4 MMOL/L (ref 0.5–1.9)
LACTIC ACID, WHOLE BLOOD: NORMAL MMOL/L (ref 0.7–2.1)
LACTIC ACID: 1.1 MMOL/L (ref 0.5–2.2)
LYMPHOCYTES # BLD: 9 % (ref 24–44)
Lab: NORMAL
MAGNESIUM: 2 MG/DL (ref 1.6–2.6)
MCH RBC QN AUTO: 33.4 PG (ref 26–34)
MCHC RBC AUTO-ENTMCNC: 32.9 G/DL (ref 31–37)
MCV RBC AUTO: 101.5 FL (ref 80–100)
MONOCYTES # BLD: 9 % (ref 1–7)
NRBC AUTOMATED: ABNORMAL PER 100 WBC
PDW BLD-RTO: 15.8 % (ref 11.5–14.5)
PHOSPHORUS: 4.4 MG/DL (ref 2.6–4.5)
PLATELET # BLD: 71 K/UL (ref 130–400)
PLATELET ESTIMATE: ABNORMAL
PMV BLD AUTO: 9.4 FL (ref 6–12)
POTASSIUM SERPL-SCNC: 4.1 MMOL/L (ref 3.7–5.3)
PROCALCITONIN: 0.36 NG/ML
RBC # BLD: 2.48 M/UL (ref 4–5.2)
RBC # BLD: ABNORMAL 10*6/UL
SEG NEUTROPHILS: 81 % (ref 36–66)
SEGMENTED NEUTROPHILS ABSOLUTE COUNT: 7.3 K/UL (ref 1.8–7.7)
SODIUM BLD-SCNC: 140 MMOL/L (ref 135–144)
SPECIMEN DESCRIPTION: NORMAL
STATUS: NORMAL
UREA NITROGEN, UR: 382 MG/DL
WBC # BLD: 9.1 K/UL (ref 3.5–11)
WBC # BLD: ABNORMAL 10*3/UL

## 2018-09-23 PROCEDURE — 6360000002 HC RX W HCPCS: Performed by: INTERNAL MEDICINE

## 2018-09-23 PROCEDURE — 87205 SMEAR GRAM STAIN: CPT

## 2018-09-23 PROCEDURE — 94640 AIRWAY INHALATION TREATMENT: CPT

## 2018-09-23 PROCEDURE — 6370000000 HC RX 637 (ALT 250 FOR IP): Performed by: FAMILY MEDICINE

## 2018-09-23 PROCEDURE — 97530 THERAPEUTIC ACTIVITIES: CPT

## 2018-09-23 PROCEDURE — 82570 ASSAY OF URINE CREATININE: CPT

## 2018-09-23 PROCEDURE — 82947 ASSAY GLUCOSE BLOOD QUANT: CPT

## 2018-09-23 PROCEDURE — 97163 PT EVAL HIGH COMPLEX 45 MIN: CPT

## 2018-09-23 PROCEDURE — 2580000003 HC RX 258: Performed by: NURSE PRACTITIONER

## 2018-09-23 PROCEDURE — 2060000000 HC ICU INTERMEDIATE R&B

## 2018-09-23 PROCEDURE — 6370000000 HC RX 637 (ALT 250 FOR IP): Performed by: NURSE PRACTITIONER

## 2018-09-23 PROCEDURE — 84100 ASSAY OF PHOSPHORUS: CPT

## 2018-09-23 PROCEDURE — 6360000002 HC RX W HCPCS: Performed by: FAMILY MEDICINE

## 2018-09-23 PROCEDURE — 80048 BASIC METABOLIC PNL TOTAL CA: CPT

## 2018-09-23 PROCEDURE — 99254 IP/OBS CNSLTJ NEW/EST MOD 60: CPT | Performed by: NURSE PRACTITIONER

## 2018-09-23 PROCEDURE — 97116 GAIT TRAINING THERAPY: CPT

## 2018-09-23 PROCEDURE — 2580000003 HC RX 258: Performed by: FAMILY MEDICINE

## 2018-09-23 PROCEDURE — 36415 COLL VENOUS BLD VENIPUNCTURE: CPT

## 2018-09-23 PROCEDURE — 84540 ASSAY OF URINE/UREA-N: CPT

## 2018-09-23 PROCEDURE — 85025 COMPLETE CBC W/AUTO DIFF WBC: CPT

## 2018-09-23 PROCEDURE — G8978 MOBILITY CURRENT STATUS: HCPCS

## 2018-09-23 PROCEDURE — 83605 ASSAY OF LACTIC ACID: CPT

## 2018-09-23 PROCEDURE — 94761 N-INVAS EAR/PLS OXIMETRY MLT: CPT

## 2018-09-23 PROCEDURE — G8979 MOBILITY GOAL STATUS: HCPCS

## 2018-09-23 PROCEDURE — 82330 ASSAY OF CALCIUM: CPT

## 2018-09-23 PROCEDURE — 83735 ASSAY OF MAGNESIUM: CPT

## 2018-09-23 PROCEDURE — 99232 SBSQ HOSP IP/OBS MODERATE 35: CPT | Performed by: FAMILY MEDICINE

## 2018-09-23 PROCEDURE — 2700000000 HC OXYGEN THERAPY PER DAY

## 2018-09-23 RX ORDER — RASAGILINE 0.5 MG/1
1 TABLET ORAL DAILY
Status: DISCONTINUED | OUTPATIENT
Start: 2018-09-23 | End: 2018-09-27 | Stop reason: HOSPADM

## 2018-09-23 RX ORDER — SENNA PLUS 8.6 MG/1
2 TABLET ORAL DAILY
Status: DISCONTINUED | OUTPATIENT
Start: 2018-09-23 | End: 2018-09-27 | Stop reason: HOSPADM

## 2018-09-23 RX ORDER — DEXTROSE MONOHYDRATE 50 MG/ML
100 INJECTION, SOLUTION INTRAVENOUS PRN
Status: DISCONTINUED | OUTPATIENT
Start: 2018-09-23 | End: 2018-09-27 | Stop reason: HOSPADM

## 2018-09-23 RX ORDER — PSEUDOEPHEDRINE HCL 30 MG
250 TABLET ORAL 3 TIMES DAILY
Status: DISCONTINUED | OUTPATIENT
Start: 2018-09-23 | End: 2018-09-23

## 2018-09-23 RX ORDER — ATORVASTATIN CALCIUM 20 MG/1
20 TABLET, FILM COATED ORAL DAILY
Status: DISCONTINUED | OUTPATIENT
Start: 2018-09-23 | End: 2018-09-27 | Stop reason: HOSPADM

## 2018-09-23 RX ORDER — NICOTINE POLACRILEX 4 MG
15 LOZENGE BUCCAL PRN
Status: DISCONTINUED | OUTPATIENT
Start: 2018-09-23 | End: 2018-09-27 | Stop reason: HOSPADM

## 2018-09-23 RX ORDER — HEPARIN SODIUM 5000 [USP'U]/ML
5000 INJECTION, SOLUTION INTRAVENOUS; SUBCUTANEOUS EVERY 8 HOURS SCHEDULED
Status: DISCONTINUED | OUTPATIENT
Start: 2018-09-23 | End: 2018-09-25

## 2018-09-23 RX ORDER — ONDANSETRON 2 MG/ML
4 INJECTION INTRAMUSCULAR; INTRAVENOUS EVERY 6 HOURS PRN
Status: DISCONTINUED | OUTPATIENT
Start: 2018-09-23 | End: 2018-09-27 | Stop reason: HOSPADM

## 2018-09-23 RX ORDER — FLUTICASONE FUROATE AND VILANTEROL 100; 25 UG/1; UG/1
1 POWDER RESPIRATORY (INHALATION) DAILY
Status: DISCONTINUED | OUTPATIENT
Start: 2018-09-24 | End: 2018-09-27 | Stop reason: HOSPADM

## 2018-09-23 RX ORDER — AMIODARONE HYDROCHLORIDE 200 MG/1
200 TABLET ORAL 2 TIMES DAILY
Status: DISCONTINUED | OUTPATIENT
Start: 2018-09-23 | End: 2018-09-27 | Stop reason: HOSPADM

## 2018-09-23 RX ORDER — CARBIDOPA AND LEVODOPA 25; 100 MG/1; MG/1
1 TABLET, EXTENDED RELEASE ORAL 4 TIMES DAILY
Status: DISCONTINUED | OUTPATIENT
Start: 2018-09-23 | End: 2018-09-27 | Stop reason: HOSPADM

## 2018-09-23 RX ORDER — LANOLIN ALCOHOL/MO/W.PET/CERES
325 CREAM (GRAM) TOPICAL 2 TIMES DAILY WITH MEALS
Status: DISCONTINUED | OUTPATIENT
Start: 2018-09-23 | End: 2018-09-27 | Stop reason: HOSPADM

## 2018-09-23 RX ORDER — IPRATROPIUM BROMIDE AND ALBUTEROL SULFATE 2.5; .5 MG/3ML; MG/3ML
3 SOLUTION RESPIRATORY (INHALATION) 3 TIMES DAILY
Status: DISCONTINUED | OUTPATIENT
Start: 2018-09-23 | End: 2018-09-23

## 2018-09-23 RX ORDER — POLYETHYLENE GLYCOL 3350 17 G/17G
17 POWDER, FOR SOLUTION ORAL DAILY PRN
Status: DISCONTINUED | OUTPATIENT
Start: 2018-09-23 | End: 2018-09-27 | Stop reason: HOSPADM

## 2018-09-23 RX ORDER — ONDANSETRON 4 MG/1
4 TABLET, ORALLY DISINTEGRATING ORAL EVERY 6 HOURS PRN
Status: DISCONTINUED | OUTPATIENT
Start: 2018-09-23 | End: 2018-09-27 | Stop reason: HOSPADM

## 2018-09-23 RX ORDER — METOPROLOL SUCCINATE 25 MG/1
25 TABLET, EXTENDED RELEASE ORAL DAILY
Status: DISCONTINUED | OUTPATIENT
Start: 2018-09-23 | End: 2018-09-27 | Stop reason: HOSPADM

## 2018-09-23 RX ORDER — ALBUTEROL SULFATE 2.5 MG/3ML
2.5 SOLUTION RESPIRATORY (INHALATION)
Status: DISCONTINUED | OUTPATIENT
Start: 2018-09-23 | End: 2018-09-23

## 2018-09-23 RX ORDER — ONDANSETRON 2 MG/ML
4 INJECTION INTRAMUSCULAR; INTRAVENOUS EVERY 6 HOURS PRN
Status: DISCONTINUED | OUTPATIENT
Start: 2018-09-23 | End: 2018-09-23 | Stop reason: SDUPTHER

## 2018-09-23 RX ORDER — PANTOPRAZOLE SODIUM 40 MG/1
40 TABLET, DELAYED RELEASE ORAL
Status: DISCONTINUED | OUTPATIENT
Start: 2018-09-23 | End: 2018-09-27 | Stop reason: HOSPADM

## 2018-09-23 RX ORDER — ALBUTEROL SULFATE 2.5 MG/3ML
2.5 SOLUTION RESPIRATORY (INHALATION) 4 TIMES DAILY
Status: DISCONTINUED | OUTPATIENT
Start: 2018-09-23 | End: 2018-09-24

## 2018-09-23 RX ORDER — ALBUTEROL SULFATE 2.5 MG/3ML
2.5 SOLUTION RESPIRATORY (INHALATION) EVERY 6 HOURS PRN
Status: DISCONTINUED | OUTPATIENT
Start: 2018-09-23 | End: 2018-09-23

## 2018-09-23 RX ORDER — BISACODYL 10 MG
10 SUPPOSITORY, RECTAL RECTAL DAILY PRN
Status: DISCONTINUED | OUTPATIENT
Start: 2018-09-23 | End: 2018-09-27 | Stop reason: HOSPADM

## 2018-09-23 RX ORDER — ALPRAZOLAM 0.25 MG/1
0.25 TABLET ORAL 3 TIMES DAILY PRN
Status: DISCONTINUED | OUTPATIENT
Start: 2018-09-23 | End: 2018-09-27 | Stop reason: HOSPADM

## 2018-09-23 RX ORDER — DEXTROSE MONOHYDRATE 25 G/50ML
12.5 INJECTION, SOLUTION INTRAVENOUS PRN
Status: DISCONTINUED | OUTPATIENT
Start: 2018-09-23 | End: 2018-09-27 | Stop reason: HOSPADM

## 2018-09-23 RX ORDER — SODIUM CHLORIDE 9 MG/ML
INJECTION, SOLUTION INTRAVENOUS CONTINUOUS
Status: DISCONTINUED | OUTPATIENT
Start: 2018-09-23 | End: 2018-09-26

## 2018-09-23 RX ORDER — ALBUTEROL SULFATE 2.5 MG/3ML
2.5 SOLUTION RESPIRATORY (INHALATION) EVERY 4 HOURS PRN
Status: DISCONTINUED | OUTPATIENT
Start: 2018-09-23 | End: 2018-09-27 | Stop reason: HOSPADM

## 2018-09-23 RX ORDER — ASPIRIN 81 MG/1
81 TABLET ORAL DAILY
Status: DISCONTINUED | OUTPATIENT
Start: 2018-09-23 | End: 2018-09-27 | Stop reason: HOSPADM

## 2018-09-23 RX ORDER — ROPINIROLE 2 MG/1
2 TABLET, FILM COATED ORAL 3 TIMES DAILY
Status: DISCONTINUED | OUTPATIENT
Start: 2018-09-23 | End: 2018-09-27 | Stop reason: HOSPADM

## 2018-09-23 RX ORDER — LANOLIN ALCOHOL/MO/W.PET/CERES
3 CREAM (GRAM) TOPICAL NIGHTLY PRN
Status: DISCONTINUED | OUTPATIENT
Start: 2018-09-23 | End: 2018-09-27 | Stop reason: HOSPADM

## 2018-09-23 RX ORDER — NICOTINE 21 MG/24HR
1 PATCH, TRANSDERMAL 24 HOURS TRANSDERMAL DAILY PRN
Status: DISCONTINUED | OUTPATIENT
Start: 2018-09-23 | End: 2018-09-27 | Stop reason: HOSPADM

## 2018-09-23 RX ORDER — CALCITRIOL 0.25 UG/1
0.25 CAPSULE, LIQUID FILLED ORAL 3 TIMES DAILY
Status: DISCONTINUED | OUTPATIENT
Start: 2018-09-23 | End: 2018-09-27 | Stop reason: HOSPADM

## 2018-09-23 RX ORDER — CALCIUM CARBONATE 200(500)MG
250 TABLET,CHEWABLE ORAL 3 TIMES DAILY
Status: DISCONTINUED | OUTPATIENT
Start: 2018-09-23 | End: 2018-09-27 | Stop reason: HOSPADM

## 2018-09-23 RX ORDER — FUROSEMIDE 40 MG/1
40 TABLET ORAL 2 TIMES DAILY
Status: DISCONTINUED | OUTPATIENT
Start: 2018-09-23 | End: 2018-09-23

## 2018-09-23 RX ORDER — LEVALBUTEROL 1.25 MG/.5ML
1.25 SOLUTION, CONCENTRATE RESPIRATORY (INHALATION) EVERY 4 HOURS PRN
Status: DISCONTINUED | OUTPATIENT
Start: 2018-09-23 | End: 2018-09-23

## 2018-09-23 RX ADMIN — MEROPENEM 1 G: 1 INJECTION, POWDER, FOR SOLUTION INTRAVENOUS at 17:34

## 2018-09-23 RX ADMIN — FERROUS SULFATE TAB EC 325 MG (65 MG FE EQUIVALENT) 325 MG: 325 (65 FE) TABLET DELAYED RESPONSE at 17:34

## 2018-09-23 RX ADMIN — MOMETASONE FUROATE AND FORMOTEROL FUMARATE DIHYDRATE 2 PUFF: 200; 5 AEROSOL RESPIRATORY (INHALATION) at 07:53

## 2018-09-23 RX ADMIN — CALCITRIOL 0.25 MCG: 0.25 CAPSULE ORAL at 08:30

## 2018-09-23 RX ADMIN — ALBUTEROL SULFATE 2.5 MG: 2.5 SOLUTION RESPIRATORY (INHALATION) at 15:43

## 2018-09-23 RX ADMIN — MAGNESIUM OXIDE TAB 400 MG (241.3 MG ELEMENTAL MG) 400 MG: 400 (241.3 MG) TAB at 21:30

## 2018-09-23 RX ADMIN — CARBIDOPA AND LEVODOPA 1 TABLET: 25; 100 TABLET, EXTENDED RELEASE ORAL at 21:30

## 2018-09-23 RX ADMIN — ENOXAPARIN SODIUM 30 MG: 30 INJECTION SUBCUTANEOUS at 08:29

## 2018-09-23 RX ADMIN — ANTACID TABLETS 250 MG: 500 TABLET, CHEWABLE ORAL at 21:30

## 2018-09-23 RX ADMIN — AMIODARONE HYDROCHLORIDE 200 MG: 200 TABLET ORAL at 08:30

## 2018-09-23 RX ADMIN — ATORVASTATIN CALCIUM 20 MG: 20 TABLET, FILM COATED ORAL at 08:31

## 2018-09-23 RX ADMIN — INSULIN LISPRO 1 UNITS: 100 INJECTION, SOLUTION INTRAVENOUS; SUBCUTANEOUS at 17:34

## 2018-09-23 RX ADMIN — RASAGILINE 1 MG: 0.5 TABLET ORAL at 08:29

## 2018-09-23 RX ADMIN — FUROSEMIDE 40 MG: 40 TABLET ORAL at 08:34

## 2018-09-23 RX ADMIN — METOPROLOL SUCCINATE 25 MG: 25 TABLET, FILM COATED, EXTENDED RELEASE ORAL at 08:29

## 2018-09-23 RX ADMIN — INSULIN LISPRO 2 UNITS: 100 INJECTION, SOLUTION INTRAVENOUS; SUBCUTANEOUS at 12:55

## 2018-09-23 RX ADMIN — CARBIDOPA AND LEVODOPA 1 TABLET: 25; 100 TABLET, EXTENDED RELEASE ORAL at 08:30

## 2018-09-23 RX ADMIN — CALCITRIOL 0.25 MCG: 0.25 CAPSULE ORAL at 21:30

## 2018-09-23 RX ADMIN — HEPARIN SODIUM 5000 UNITS: 5000 INJECTION INTRAVENOUS; SUBCUTANEOUS at 14:42

## 2018-09-23 RX ADMIN — AMIODARONE HYDROCHLORIDE 200 MG: 200 TABLET ORAL at 21:31

## 2018-09-23 RX ADMIN — ALBUTEROL SULFATE 2.5 MG: 2.5 SOLUTION RESPIRATORY (INHALATION) at 20:19

## 2018-09-23 RX ADMIN — ROPINIROLE HYDROCHLORIDE 2 MG: 2 TABLET, FILM COATED ORAL at 21:30

## 2018-09-23 RX ADMIN — ROPINIROLE HYDROCHLORIDE 2 MG: 2 TABLET, FILM COATED ORAL at 14:41

## 2018-09-23 RX ADMIN — ANTACID TABLETS 250 MG: 500 TABLET, CHEWABLE ORAL at 14:42

## 2018-09-23 RX ADMIN — SODIUM CHLORIDE: 9 INJECTION, SOLUTION INTRAVENOUS at 03:37

## 2018-09-23 RX ADMIN — CHOLECALCIFEROL CAP 125 MCG (5000 UNIT) 5000 UNITS: 125 CAP at 08:29

## 2018-09-23 RX ADMIN — ROPINIROLE HYDROCHLORIDE 2 MG: 2 TABLET, FILM COATED ORAL at 08:29

## 2018-09-23 RX ADMIN — CARBIDOPA AND LEVODOPA 1 TABLET: 25; 100 TABLET, EXTENDED RELEASE ORAL at 14:00

## 2018-09-23 RX ADMIN — CARBIDOPA AND LEVODOPA 1 TABLET: 25; 100 TABLET, EXTENDED RELEASE ORAL at 17:34

## 2018-09-23 RX ADMIN — CALCITRIOL 0.25 MCG: 0.25 CAPSULE ORAL at 14:41

## 2018-09-23 RX ADMIN — MEROPENEM 1 G: 1 INJECTION, POWDER, FOR SOLUTION INTRAVENOUS at 10:51

## 2018-09-23 RX ADMIN — ASPIRIN 81 MG: 81 TABLET, COATED ORAL at 08:30

## 2018-09-23 RX ADMIN — ALBUTEROL SULFATE 2.5 MG: 2.5 SOLUTION RESPIRATORY (INHALATION) at 07:53

## 2018-09-23 RX ADMIN — TIOTROPIUM BROMIDE 18 MCG: 18 CAPSULE ORAL; RESPIRATORY (INHALATION) at 07:53

## 2018-09-23 RX ADMIN — FERROUS SULFATE TAB EC 325 MG (65 MG FE EQUIVALENT) 325 MG: 325 (65 FE) TABLET DELAYED RESPONSE at 08:34

## 2018-09-23 RX ADMIN — ALBUTEROL SULFATE 2.5 MG: 2.5 SOLUTION RESPIRATORY (INHALATION) at 11:24

## 2018-09-23 RX ADMIN — LINAGLIPTIN 5 MG: 5 TABLET, FILM COATED ORAL at 08:30

## 2018-09-23 RX ADMIN — MAGNESIUM OXIDE TAB 400 MG (241.3 MG ELEMENTAL MG) 400 MG: 400 (241.3 MG) TAB at 08:31

## 2018-09-23 RX ADMIN — PANTOPRAZOLE SODIUM 40 MG: 40 TABLET, DELAYED RELEASE ORAL at 14:41

## 2018-09-23 RX ADMIN — ANTACID TABLETS 250 MG: 500 TABLET, CHEWABLE ORAL at 08:32

## 2018-09-23 RX ADMIN — PANTOPRAZOLE SODIUM 40 MG: 40 TABLET, DELAYED RELEASE ORAL at 06:56

## 2018-09-23 RX ADMIN — SENNOSIDES 17.2 MG: 8.6 TABLET, FILM COATED ORAL at 08:29

## 2018-09-23 ASSESSMENT — ENCOUNTER SYMPTOMS
SHORTNESS OF BREATH: 0
ABDOMINAL PAIN: 0
SORE THROAT: 0
EYE DISCHARGE: 0
CONSTIPATION: 0
DIARRHEA: 0
NAUSEA: 0
COUGH: 0
CHEST TIGHTNESS: 0
BACK PAIN: 0
BLOOD IN STOOL: 0
WHEEZING: 0
VOMITING: 0
EYE PAIN: 0
RHINORRHEA: 0
COLOR CHANGE: 0

## 2018-09-23 ASSESSMENT — PAIN SCALES - GENERAL
PAINLEVEL_OUTOF10: 7
PAINLEVEL_OUTOF10: 0
PAINLEVEL_OUTOF10: 0

## 2018-09-23 NOTE — H&P
[facility]    HISTORY AND PHYSICAL EXAMINATION            Date:   9/23/2018  Patient name:  Franca Hardy  Date of admission:  9/22/2018  8:34 PM  MRN:   [id number]  Account:  [de-identified]  YOB: 1945  PCP:    Maya Wolfe DO  Room:   [facility]31003-02  Code Status:    Full Code    Chief Complaint:     Chief Complaint   Patient presents with    Chills     past 1 hr    Shortness of Breath     past 1 hr    Abdominal Pain     lt abd pain       History Obtained From:     patient, electronic medical record    History of Present Illness: The patient is a 72 y.o. Non-/non  female who presents with Chills (past 1 hr); Shortness of Breath (past 1 hr); and Abdominal Pain (lt abd pain)   and she is admitted to the hospital for the management of  UTI sepsis     73 yo f with h/o Afib, CHF, COPD, chronic respiratory failure presented to ED with chills, lethargy. Patient states she was doing well till yesterday afternoon. Patient suddenly started feel shaky and not feeling well. Patient's  called EMS, and patient was found to be febrile, and was brought to ED. In ED, urine was suggestive of UTI. Temp 100.3. Patient was hypotensive. No leucocytosis. Lactic acid of 2.6 in ED, which improved to 1.1 this morning. Patient denies any dysuria, reports frequency, but states that is likely from lasix. Patient was admitted to [facility] for respiratory failure, UTI, afib with rvr from 8/20 to 9/7. Urine culture from 8/20 with no growth. Urine culture from June with E.coli with ESBL. Patient started on merrem and ID consulted.          Past Medical History:     Past Medical History:   Diagnosis Date    Acute on chronic diastolic congestive heart failure ([address]     Anesthesia complication     DIFFICULTY WAKING OP    Asthma     Atrial fibrillation ([address] 2013    Cataracts, bilateral     Cellulitis     BILAT LOWER LEGS-PT mouth 2 times daily (before meals) 1/22/18   Lesly Hill DO   BREO ELLIPTA 100-25 MCG/INH AEPB inhaler Inhale 1 puff into the lungs daily 1/22/18   Lesly Hill DO   ferrous sulfate 325 (65 Fe) MG EC tablet Take 1 tablet by mouth 2 times daily (with meals) 12/21/17   Cleavon Ken P Blood, DO   vitamin D (CHOLECALCIFEROL) 5000 units CAPS capsule Take 5,000 Units by mouth daily    Historical Provider, MD   rasagiline mesylate 1 MG TABS Take 1 tablet by mouth daily    Historical Provider, MD   Oxygen Concentrator Dx: Pneumonia, use as directed. Patient taking differently: Dx: Pneumonia, use as directed. ON 24/7 2/10/17   Lesly Hill DO        Allergies:     Dye [barium-containing compounds]; Pcn [penicillins]; Bactrim [sulfamethoxazole-trimethoprim]; and Red dye    Social History:     Tobacco:    reports that she quit smoking about 47 years ago. Her smoking use included Cigarettes. She has a 30.00 pack-year smoking history. She has never used smokeless tobacco.  Alcohol:      reports that she drinks about 1.2 oz of alcohol per week . Drug Use:  reports that she does not use drugs. Family History:     Family History   Problem Relation Age of Onset    Heart Failure Mother     Hypertension Mother     Heart Disease Mother     High Blood Pressure Mother        Review of Systems:     Positive and Negative as described in HPI. CONSTITUTIONAL:  + fever chills, fatigue, negative for sweats,  weight loss  HEENT: + drooping eyes, negative for vision, hearing changes, no blurry vision, diplopia, runny nose, throat pain  RESPIRATORY:  negative for shortness of breath, cough, congestion, wheezing. CARDIOVASCULAR:  negative for chest pain, palpitations.   GASTROINTESTINAL:  + LLQ pain (improved since yesterday) negative for nausea, vomiting, diarrhea, constipation, change in bowel habits  GENITOURINARY:  negative for difficulty of urination, burning with urination, + frequency (states it is from lasix)  INTEGUMENT: + gross lesions, bruising or bleeding on exposed skin area  Extremities:  peripheral pulses palpable, no pedal edema or calf pain with palpation  Psych: normal affect       Investigations:      Laboratory Testing:  Recent Results (from the past 24 hour(s))   POC Glucose Fingerstick    Collection Time: 09/22/18  8:43 PM   Result Value Ref Range    POC Glucose 156 (H) 65 - 105 mg/dL   CBC Auto Differential    Collection Time: 09/22/18  8:58 PM   Result Value Ref Range    WBC 8.3 3.5 - 11.0 k/uL    RBC 3.03 (L) 4.0 - 5.2 m/uL    Hemoglobin 10.0 (L) 12.0 - 16.0 g/dL    Hematocrit 31.0 (L) 36 - 46 %    .4 (H) 80 - 100 fL    MCH 33.2 26 - 34 pg    MCHC 32.4 31 - 37 g/dL    RDW 15.9 (H) 11.5 - 14.5 %    Platelets 82 (L) 005 - 400 k/uL    MPV 9.4 6.0 - 12.0 fL    NRBC Automated NOT REPORTED per 100 WBC    Differential Type NOT REPORTED     Seg Neutrophils 93 (H) 36 - 66 %    Lymphocytes 5 (L) 24 - 44 %    Monocytes 1 1 - 7 %    Eosinophils % 1 1 - 4 %    Basophils 0 0 - 2 %    Immature Granulocytes NOT REPORTED 0 %    Segs Absolute 7.70 1.8 - 7.7 k/uL    Absolute Lymph # 0.40 (L) 1.0 - 4.8 k/uL    Absolute Mono # 0.10 (L) 0.2 - 0.8 k/uL    Absolute Eos # 0.10 0.0 - 0.4 k/uL    Basophils # 0.00 0.0 - 0.2 k/uL    Absolute Immature Granulocyte NOT REPORTED 0.00 - 0.30 k/uL    WBC Morphology NOT REPORTED     RBC Morphology NOT REPORTED     Platelet Estimate NOT REPORTED    Comprehensive Metabolic Panel    Collection Time: 09/22/18  8:58 PM   Result Value Ref Range    Glucose 157 (H) 70 - 99 mg/dL    BUN 46 (H) 8 - 23 mg/dL    CREATININE 1.99 (H) 0.50 - 0.90 mg/dL    Bun/Cre Ratio 23 (H) 9 - 20    Calcium 10.1 8.6 - 10.4 mg/dL    Sodium 145 (H) 135 - 144 mmol/L    Potassium 4.2 3.7 - 5.3 mmol/L    Chloride 98 98 - 107 mmol/L    CO2 31 20 - 31 mmol/L    Anion Gap 16 9 - 17 mmol/L    Alkaline Phosphatase 80 35 - 104 U/L    ALT 7 5 - 33 U/L    AST 19 <32 U/L    Total Bilirubin 0.26 (L) 0.3 - 1.2 mg/dL    Total Protein 6.7 6.4 09/22/18 11:47 PM   Result Value Ref Range    Lactic Acid, Sepsis 2.4 (H) 0.5 - 1.9 mmol/L    Lactic Acid, Sepsis, Whole Blood NOT REPORTED 0.5 - 1.9 mmol/L   Flu A/B Ag Detection    Collection Time: 09/23/18 12:21 AM   Result Value Ref Range    Specimen Description . NASOPHARYNGEAL SWAB     Special Requests NOT REPORTED     Direct Exam PRESUMPTIVE NEGATIVE for Influenza A + B antigens. Direct Exam       PCR testing to confirm this result is available upon request.  Specimen will be    Direct Exam        saved in the laboratory for 7 days. Please call [phone] if PCR testing is    Direct Exam  indicated.      Status FINAL 09/23/2018    Ionized Calcium    Collection Time: 09/23/18  6:20 AM   Result Value Ref Range    Calcium, Ion 1.24 1.13 - 1.33 mmol/L   Magnesium    Collection Time: 09/23/18  6:20 AM   Result Value Ref Range    Magnesium 2.0 1.6 - 2.6 mg/dL   Phosphorus    Collection Time: 09/23/18  6:20 AM   Result Value Ref Range    Phosphorus 4.4 2.6 - 4.5 mg/dL   Basic Metabolic Panel w/ Reflex to MG    Collection Time: 09/23/18  6:20 AM   Result Value Ref Range    Glucose 126 (H) 70 - 99 mg/dL    BUN 48 (H) 8 - 23 mg/dL    CREATININE 2.31 (H) 0.50 - 0.90 mg/dL    Bun/Cre Ratio 21 (H) 9 - 20    Calcium 9.1 8.6 - 10.4 mg/dL    Sodium 140 135 - 144 mmol/L    Potassium 4.1 3.7 - 5.3 mmol/L    Chloride 96 (L) 98 - 107 mmol/L    CO2 35 (H) 20 - 31 mmol/L    Anion Gap 9 9 - 17 mmol/L    GFR Non-African American 21 (L) >60 mL/min    GFR  25 (L) >60 mL/min    GFR Comment          GFR Staging NOT REPORTED    Lactic acid, plasma    Collection Time: 09/23/18  6:20 AM   Result Value Ref Range    Lactic Acid 1.1 0.5 - 2.2 mmol/L    Lactic Acid, Whole Blood NOT REPORTED 0.7 - 2.1 mmol/L   CBC auto differential    Collection Time: 09/23/18  6:20 AM   Result Value Ref Range    WBC 9.1 3.5 - 11.0 k/uL    RBC 2.48 (L) 4.0 - 5.2 m/uL    Hemoglobin 8.3 (L) 12.0 - 16.0 g/dL    Hematocrit 25.1 (L) 36 - 46 % achievable. COMPARISON: None. HISTORY: ORDERING SYSTEM PROVIDED HISTORY: Pulmonary edema/pleural effusions FINDINGS: Mediastinum: Thoracic aorta demonstrates moderate calcification without aneurysm. Pulmonary trunk appears nondilated. Cardiomegaly is present without pericardial effusion. Several prominent mediastinal lymph nodes are noted. The esophagus appears to be distended and filled with oral contrast/ingested food material to the level of the proximal esophagus with a moderate size hiatal hernia noted. Lungs/pleura: Moderate size bilateral pleural effusions are noted with consolidative changes involving the lower lobes. There is near complete consolidation involving the right lower lobe. In addition, there are ground-glass /bronchovascular opacities involving the aerated upper lobes with smooth septal thickening noted. No pneumothorax. No suspicious noncalcified lung nodule or mass. Upper Abdomen: No acute findings. Visualized adrenal glands appear unremarkable. Soft Tissues/Bones: Visualized soft tissues surrounding the chest wall demonstrate no acute findings. Osseous structures demonstrate a diffuse mottled appearance. *Moderate-sized bilateral pleural effusions with consolidative changes of the lower lobes. There is near complete consolidation involving the right lower lobe. In addition, there appears to be cardiomegaly with bronchovascular /ground-glass opacities involving the aerated upper lobes with smooth septal thickening. Constellation of findings suggest CHF/pulmonary edema. Superimposed infectious process cannot be excluded. Repeat CT after therapy is recommended to ensure response of therapy. *Prominent mediastinal lymph nodes. Finding is nonspecific. Attention on follow-up is suggested. *Oral contrast/ingested food material within the distended esophagus to the level of the proximal esophagus with a moderate size hiatal hernia present.  Achalasia at the GE junction cannot be esophagus appears patulous and dilated. The findings were sent to the Radiology Results [address] at 11:20 am on 8/28/2018to be communicated to a licensed caregiver. Xr Chest Portable    Result Date: 9/22/2018  EXAMINATION: SINGLE XRAY VIEW OF THE CHEST 9/22/2018 10:48 pm COMPARISON: 09/05/2018 HISTORY: ORDERING SYSTEM PROVIDED HISTORY: sob TECHNOLOGIST PROVIDED HISTORY: sob Ordering Physician Provided Reason for Exam: SOB Acuity: Acute Type of Exam: Initial FINDINGS: Enlarged heart shadow. No focal consolidation. No significant pleural effusion. No pneumothorax. 1. No focal airspace disease 2. Cardiomegaly is stable     Xr Chest Portable    Result Date: 9/5/2018  EXAMINATION: SINGLE XRAY VIEW OF THE CHEST 9/5/2018 6:30 am COMPARISON: AP chest from 09/01/2018 HISTORY: ORDERING SYSTEM PROVIDED HISTORY: CHF TECHNOLOGIST PROVIDED HISTORY: CHF Ordering Physician Provided Reason for Exam: CHF Acuity: Unknown Type of Exam: Unknown Additional history of diabetes, hypertension, asthma and chronic obstructive pulmonary disease. FINDINGS: Right-sided PICC unchanged tip position in the mid SVC. Overlying ECG monitor leads and snaps. Prominent but unchanged cardiomediastinal shadow. Increased lung volumes with somewhat improved central vascular congestion and mild basilar atelectasis. No new pulmonary finding or large pleural effusion. No pneumothorax. DJD spine. Continued interval improvement mild CHF and bibasilar atelectasis probable small effusions. Minimal infiltrate again a possibility. Xr Chest Portable    Result Date: 9/1/2018  EXAMINATION: SINGLE XRAY VIEW OF THE CHEST 9/1/2018 6:26 am COMPARISON: August 31 HISTORY: ORDERING SYSTEM PROVIDED HISTORY: infiltrate TECHNOLOGIST PROVIDED HISTORY: infiltrate Ordering Physician Provided Reason for Exam: infiltrate Acuity: Unknown Type of Exam: Unknown FINDINGS: Right upper extremity PICC line tip terminates over the SVC region, unchanged.

## 2018-09-23 NOTE — PLAN OF CARE
[address] [place] [address]    Second Visit Note  For more detailed information please refer to the progress note of the day      9/23/2018    4:55 PM    Name:   Sandra Tavarez  MRN:     [id number]     Acct:      [de-identified]   Room:   [facility]100-02   Day:  0  Admit Date:  9/22/2018  8:34 PM    PCP:   Scar Christopher DO  Code Status:  Full Code        Pt vitals were reviewed   New labs were reviewed   Patient was seen    71 yo f admitted for UTI sepsis. Currently on meropenem  Afebrile. BP better. Updated plan :   - continue meropenem  - monitor kidney function. Cr 2.3 today.  (baseline 1.8-2.0)         Brian Rainey MD  9/23/2018  4:55 PM

## 2018-09-23 NOTE — ED PROVIDER NOTES
[address] [place] [address] ED  eMERGENCY dEPARTMENT eNCOUnter      Pt Name: Low Denis  MRN: [id number]  Arms[place]gfurt 1945  Date of evaluation: 9/22/2018  Provider: Ashley Aceves MD    [address] [place] [address]       Chief Complaint   Patient presents with    Chills     past 1 hr    Shortness of Breath     past 1 hr    Abdominal Pain     lt abd pain         HISTORY OF PRESENT ILLNESS   (Location/Symptom, Timing/Onset, Context/Setting, Quality, Duration, Modifying Factors, Severity)  Note limiting factors. Low Denis is a 72 y.o. female who presents to the emergency department Complaint of chills. Patient has a history of multiple urinary tract infections. Patient was recently admitted for a chaotic. Patient denies any chest pain. HPI    Nursing Notes were reviewed. REVIEW OF SYSTEMS    (2-9 systems for level 4, 10 or more for level 5)     Review of Systems   Constitutional: Positive for chills. Negative for diaphoresis, fatigue and fever. HENT: Negative for ear pain, nosebleeds, rhinorrhea, sneezing and sore throat. Eyes: Negative for pain, discharge and visual disturbance. Respiratory: Negative for cough, chest tightness, shortness of breath and wheezing. Cardiovascular: Negative for chest pain and palpitations. Gastrointestinal: Negative for abdominal pain, blood in stool, constipation, diarrhea, nausea and vomiting. Endocrine: Negative for heat intolerance and polydipsia. Genitourinary: Negative for dysuria, flank pain, frequency, hematuria and urgency. Musculoskeletal: Negative for arthralgias, back pain, neck pain and neck stiffness. Skin: Negative for color change, rash and wound. Allergic/Immunologic: Negative for environmental allergies and immunocompromised state. Neurological: Negative for dizziness, syncope, weakness, numbness and headaches. Hematological: Does not bruise/bleed easily. Psychiatric/Behavioral: Negative for behavioral problems, self-injury and suicidal ideas. CAPSULE    Take 5,000 Units by mouth daily       ALLERGIES     Dye [barium-containing compounds]; Pcn [penicillins]; Bactrim [sulfamethoxazole-trimethoprim]; and Red dye    FAMILY HISTORY       Family History   Problem Relation Age of Onset    Heart Failure Mother     Hypertension Mother     Heart Disease Mother     High Blood Pressure Mother           SOCIAL HISTORY       Social History     Social History    Marital status:      Spouse name: N/A    Number of children: N/A    Years of education: N/A     Social History Main Topics    Smoking status: Former Smoker     Packs/day: 2.00     Years: 15.00     Types: Cigarettes     Quit date: 10/31/1970    Smokeless tobacco: Never Used    Alcohol use 1.2 oz/week     2 Shots of liquor per week      Comment: 4 DRINKS A YEAR    Drug use: No      Comment: Pt denies use.  Sexual activity: No      Comment: not for the past year d/t illness, denies  concerns/pain     Other Topics Concern    None     Social History Narrative    None       SCREENINGS             PHYSICAL EXAM    (up to 7 for level 4, 8 or more for level 5)     ED Triage Vitals [09/22/18 2030]   BP Temp Temp Source Pulse Resp SpO2 Height Weight   (!) 140/76 100.3 °F (37.9 °C) Oral 88 22 99 % 5' (1.524 m) 170 lb (77.1 kg)       Physical Exam   Constitutional: She is oriented to person, place, and time. She appears well-developed. No distress. HENT:   Head: Normocephalic and atraumatic. Eyes: EOM are normal. Right eye exhibits no discharge. Left eye exhibits no discharge. Neck: Normal range of motion. No tracheal deviation present. Cardiovascular: Normal rate and regular rhythm. Exam reveals no friction rub. No murmur heard. Pulmonary/Chest: Effort normal. No stridor. No respiratory distress. She has wheezes. She has no rales. She exhibits no tenderness. Abdominal: Soft. She exhibits no distension and no mass. There is tenderness. There is no rebound and no guarding.    Mild

## 2018-09-23 NOTE — ED NOTES
Pt presents to ED by private auto with c/o Shortness of breath, chills, left abdominal pain x 2 hours; pt states she suddenly started feeling SOB and chills within the last 2 hours; denies symptoms earlier;  states pt was lethargic this morning; Pt is warm to touch. Family states she was admitted to [facility] for 3 weeks about a month- dc to [facility] for rehab; home for 1 week. Pt states she has episodes of Afib; negative on the monitor at this time. Pt states she was shakey from head to toe; Pt states she took tylenol earlier for pain relief. Family reports she has cellulitis in the right leg- just finished antibiotic. Wheezing noted bilaterally. A+ox4, labored breathing, pt brought to room by wheelchair; pt ambulates at home with walker; had \"normal day\" today with grocery shopping.          Mayra Hankins RN  09/22/18 6880

## 2018-09-23 NOTE — PLAN OF CARE
.A repeat sepsis focused exam has been completed 2:40 AM.    I started the patient on .9 NS at 50mls/hr. I believe the ER held IVF because of history of CHF. The patient is drowsy but easy to arouse. Her most recent vitals are 94/32, 87, RR 26 T. 98.5   Sats 95% on 2L nc. The patient denies complaints at this time. Her RR is actually 18 (for me) they are not labored, she is clear but diminished posteriorly. Her EF per Echo is 60-65%. They patient has cefepime and vancomycin IV ordered.  Blood cultures and urine cultures pending

## 2018-09-24 PROBLEM — B96.20 E. COLI UTI (URINARY TRACT INFECTION): Status: ACTIVE | Noted: 2018-03-08

## 2018-09-24 LAB
ABSOLUTE EOS #: 0.1 K/UL (ref 0–0.4)
ABSOLUTE IMMATURE GRANULOCYTE: ABNORMAL K/UL (ref 0–0.3)
ABSOLUTE LYMPH #: 0.4 K/UL (ref 1–4.8)
ABSOLUTE MONO #: 0.6 K/UL (ref 0.2–0.8)
ANION GAP SERPL CALCULATED.3IONS-SCNC: 10 MMOL/L (ref 9–17)
BASOPHILS # BLD: 0 % (ref 0–2)
BASOPHILS ABSOLUTE: 0 K/UL (ref 0–0.2)
BUN BLDV-MCNC: 34 MG/DL (ref 8–23)
BUN/CREAT BLD: 17 (ref 9–20)
CALCIUM SERPL-MCNC: 8.5 MG/DL (ref 8.6–10.4)
CHLORIDE BLD-SCNC: 102 MMOL/L (ref 98–107)
CO2: 32 MMOL/L (ref 20–31)
CREAT SERPL-MCNC: 1.95 MG/DL (ref 0.5–0.9)
DIFFERENTIAL TYPE: ABNORMAL
EOSINOPHILS RELATIVE PERCENT: 2 % (ref 1–4)
GFR AFRICAN AMERICAN: 31 ML/MIN
GFR NON-AFRICAN AMERICAN: 25 ML/MIN
GFR SERPL CREATININE-BSD FRML MDRD: ABNORMAL ML/MIN/{1.73_M2}
GFR SERPL CREATININE-BSD FRML MDRD: ABNORMAL ML/MIN/{1.73_M2}
GLUCOSE BLD-MCNC: 121 MG/DL (ref 65–105)
GLUCOSE BLD-MCNC: 125 MG/DL (ref 65–105)
GLUCOSE BLD-MCNC: 129 MG/DL (ref 70–99)
GLUCOSE BLD-MCNC: 131 MG/DL (ref 65–105)
GLUCOSE BLD-MCNC: 142 MG/DL (ref 65–105)
GLUCOSE BLD-MCNC: 145 MG/DL (ref 65–105)
GLUCOSE BLD-MCNC: 172 MG/DL (ref 65–105)
GLUCOSE BLD-MCNC: 214 MG/DL (ref 65–105)
HCT VFR BLD CALC: 26.6 % (ref 36–46)
HEMOGLOBIN: 8.8 G/DL (ref 12–16)
IMMATURE GRANULOCYTES: ABNORMAL %
LYMPHOCYTES # BLD: 7 % (ref 24–44)
MCH RBC QN AUTO: 33.2 PG (ref 26–34)
MCHC RBC AUTO-ENTMCNC: 33.1 G/DL (ref 31–37)
MCV RBC AUTO: 100.4 FL (ref 80–100)
MONOCYTES # BLD: 11 % (ref 1–7)
NRBC AUTOMATED: ABNORMAL PER 100 WBC
PDW BLD-RTO: 15.5 % (ref 11.5–14.5)
PLATELET # BLD: 73 K/UL (ref 130–400)
PLATELET ESTIMATE: ABNORMAL
PMV BLD AUTO: 9.1 FL (ref 6–12)
POTASSIUM SERPL-SCNC: 4.2 MMOL/L (ref 3.7–5.3)
RBC # BLD: 2.65 M/UL (ref 4–5.2)
RBC # BLD: ABNORMAL 10*6/UL
SEG NEUTROPHILS: 80 % (ref 36–66)
SEGMENTED NEUTROPHILS ABSOLUTE COUNT: 4.4 K/UL (ref 1.8–7.7)
SODIUM BLD-SCNC: 144 MMOL/L (ref 135–144)
WBC # BLD: 5.4 K/UL (ref 3.5–11)
WBC # BLD: ABNORMAL 10*3/UL

## 2018-09-24 PROCEDURE — 2580000003 HC RX 258: Performed by: NURSE PRACTITIONER

## 2018-09-24 PROCEDURE — 2580000003 HC RX 258: Performed by: FAMILY MEDICINE

## 2018-09-24 PROCEDURE — 84145 PROCALCITONIN (PCT): CPT

## 2018-09-24 PROCEDURE — 6360000002 HC RX W HCPCS: Performed by: FAMILY MEDICINE

## 2018-09-24 PROCEDURE — 99232 SBSQ HOSP IP/OBS MODERATE 35: CPT | Performed by: INTERNAL MEDICINE

## 2018-09-24 PROCEDURE — 6370000000 HC RX 637 (ALT 250 FOR IP): Performed by: NURSE PRACTITIONER

## 2018-09-24 PROCEDURE — 82947 ASSAY GLUCOSE BLOOD QUANT: CPT

## 2018-09-24 PROCEDURE — 6370000000 HC RX 637 (ALT 250 FOR IP): Performed by: FAMILY MEDICINE

## 2018-09-24 PROCEDURE — 2060000000 HC ICU INTERMEDIATE R&B

## 2018-09-24 PROCEDURE — 36415 COLL VENOUS BLD VENIPUNCTURE: CPT

## 2018-09-24 PROCEDURE — 6370000000 HC RX 637 (ALT 250 FOR IP): Performed by: INTERNAL MEDICINE

## 2018-09-24 PROCEDURE — 2700000000 HC OXYGEN THERAPY PER DAY

## 2018-09-24 PROCEDURE — 94640 AIRWAY INHALATION TREATMENT: CPT

## 2018-09-24 PROCEDURE — 6360000002 HC RX W HCPCS: Performed by: INTERNAL MEDICINE

## 2018-09-24 PROCEDURE — 85025 COMPLETE CBC W/AUTO DIFF WBC: CPT

## 2018-09-24 PROCEDURE — 80048 BASIC METABOLIC PNL TOTAL CA: CPT

## 2018-09-24 RX ORDER — CALCITRIOL 0.25 UG/1
0.25 CAPSULE, LIQUID FILLED ORAL 3 TIMES DAILY
Status: ON HOLD | COMMUNITY
End: 2018-10-06 | Stop reason: SDUPTHER

## 2018-09-24 RX ORDER — ACETAMINOPHEN 500 MG
1000 TABLET ORAL EVERY 8 HOURS PRN
Status: DISCONTINUED | OUTPATIENT
Start: 2018-09-24 | End: 2018-09-27 | Stop reason: HOSPADM

## 2018-09-24 RX ORDER — ALBUTEROL SULFATE 2.5 MG/3ML
2.5 SOLUTION RESPIRATORY (INHALATION) 3 TIMES DAILY
Status: DISCONTINUED | OUTPATIENT
Start: 2018-09-24 | End: 2018-09-27 | Stop reason: HOSPADM

## 2018-09-24 RX ORDER — HYDROCODONE BITARTRATE AND ACETAMINOPHEN 5; 325 MG/1; MG/1
1 TABLET ORAL EVERY 6 HOURS PRN
Status: ON HOLD | COMMUNITY
End: 2018-09-27

## 2018-09-24 RX ORDER — HYDROCODONE BITARTRATE AND ACETAMINOPHEN 5; 325 MG/1; MG/1
1 TABLET ORAL EVERY 4 HOURS PRN
Status: DISCONTINUED | OUTPATIENT
Start: 2018-09-24 | End: 2018-09-27 | Stop reason: HOSPADM

## 2018-09-24 RX ADMIN — ASPIRIN 81 MG: 81 TABLET, COATED ORAL at 08:21

## 2018-09-24 RX ADMIN — HYDROCODONE BITARTRATE AND ACETAMINOPHEN 1 TABLET: 5; 325 TABLET ORAL at 17:38

## 2018-09-24 RX ADMIN — HEPARIN SODIUM 5000 UNITS: 5000 INJECTION INTRAVENOUS; SUBCUTANEOUS at 12:41

## 2018-09-24 RX ADMIN — SODIUM CHLORIDE: 9 INJECTION, SOLUTION INTRAVENOUS at 22:15

## 2018-09-24 RX ADMIN — ALBUTEROL SULFATE 2.5 MG: 2.5 SOLUTION RESPIRATORY (INHALATION) at 14:48

## 2018-09-24 RX ADMIN — FERROUS SULFATE TAB EC 325 MG (65 MG FE EQUIVALENT) 325 MG: 325 (65 FE) TABLET DELAYED RESPONSE at 08:22

## 2018-09-24 RX ADMIN — INSULIN LISPRO 1 UNITS: 100 INJECTION, SOLUTION INTRAVENOUS; SUBCUTANEOUS at 22:16

## 2018-09-24 RX ADMIN — ANTACID TABLETS 250 MG: 500 TABLET, CHEWABLE ORAL at 12:41

## 2018-09-24 RX ADMIN — AMIODARONE HYDROCHLORIDE 200 MG: 200 TABLET ORAL at 08:22

## 2018-09-24 RX ADMIN — CALCITRIOL 0.25 MCG: 0.25 CAPSULE ORAL at 22:15

## 2018-09-24 RX ADMIN — PANTOPRAZOLE SODIUM 40 MG: 40 TABLET, DELAYED RELEASE ORAL at 17:33

## 2018-09-24 RX ADMIN — CALCITRIOL 0.25 MCG: 0.25 CAPSULE ORAL at 08:22

## 2018-09-24 RX ADMIN — RASAGILINE 1 MG: 0.5 TABLET ORAL at 08:21

## 2018-09-24 RX ADMIN — ALBUTEROL SULFATE 2.5 MG: 2.5 SOLUTION RESPIRATORY (INHALATION) at 08:10

## 2018-09-24 RX ADMIN — LINAGLIPTIN 5 MG: 5 TABLET, FILM COATED ORAL at 08:21

## 2018-09-24 RX ADMIN — ROPINIROLE HYDROCHLORIDE 2 MG: 2 TABLET, FILM COATED ORAL at 12:41

## 2018-09-24 RX ADMIN — CARBIDOPA AND LEVODOPA 1 TABLET: 25; 100 TABLET, EXTENDED RELEASE ORAL at 22:16

## 2018-09-24 RX ADMIN — MEROPENEM 1 G: 1 INJECTION, POWDER, FOR SOLUTION INTRAVENOUS at 10:18

## 2018-09-24 RX ADMIN — PANTOPRAZOLE SODIUM 40 MG: 40 TABLET, DELAYED RELEASE ORAL at 06:21

## 2018-09-24 RX ADMIN — METOPROLOL SUCCINATE 25 MG: 25 TABLET, FILM COATED, EXTENDED RELEASE ORAL at 08:22

## 2018-09-24 RX ADMIN — ROPINIROLE HYDROCHLORIDE 2 MG: 2 TABLET, FILM COATED ORAL at 08:22

## 2018-09-24 RX ADMIN — ANTACID TABLETS 250 MG: 500 TABLET, CHEWABLE ORAL at 22:15

## 2018-09-24 RX ADMIN — SENNOSIDES 17.2 MG: 8.6 TABLET, FILM COATED ORAL at 08:21

## 2018-09-24 RX ADMIN — MEROPENEM 1 G: 1 INJECTION, POWDER, FOR SOLUTION INTRAVENOUS at 17:33

## 2018-09-24 RX ADMIN — CALCITRIOL 0.25 MCG: 0.25 CAPSULE ORAL at 12:41

## 2018-09-24 RX ADMIN — CARBIDOPA AND LEVODOPA 1 TABLET: 25; 100 TABLET, EXTENDED RELEASE ORAL at 12:41

## 2018-09-24 RX ADMIN — MAGNESIUM OXIDE TAB 400 MG (241.3 MG ELEMENTAL MG) 400 MG: 400 (241.3 MG) TAB at 22:16

## 2018-09-24 RX ADMIN — ALBUTEROL SULFATE 2.5 MG: 2.5 SOLUTION RESPIRATORY (INHALATION) at 19:17

## 2018-09-24 RX ADMIN — SODIUM CHLORIDE: 9 INJECTION, SOLUTION INTRAVENOUS at 00:15

## 2018-09-24 RX ADMIN — HYDROCODONE BITARTRATE AND ACETAMINOPHEN 1 TABLET: 5; 325 TABLET ORAL at 22:37

## 2018-09-24 RX ADMIN — SODIUM CHLORIDE: 9 INJECTION, SOLUTION INTRAVENOUS at 17:35

## 2018-09-24 RX ADMIN — FERROUS SULFATE TAB EC 325 MG (65 MG FE EQUIVALENT) 325 MG: 325 (65 FE) TABLET DELAYED RESPONSE at 17:33

## 2018-09-24 RX ADMIN — TIOTROPIUM BROMIDE 18 MCG: 18 CAPSULE ORAL; RESPIRATORY (INHALATION) at 08:10

## 2018-09-24 RX ADMIN — CHOLECALCIFEROL CAP 125 MCG (5000 UNIT) 5000 UNITS: 125 CAP at 08:22

## 2018-09-24 RX ADMIN — FLUTICASONE FUROATE AND VILANTEROL 1 PUFF: 100; 25 POWDER RESPIRATORY (INHALATION) at 12:44

## 2018-09-24 RX ADMIN — MAGNESIUM OXIDE TAB 400 MG (241.3 MG ELEMENTAL MG) 400 MG: 400 (241.3 MG) TAB at 08:22

## 2018-09-24 RX ADMIN — ATORVASTATIN CALCIUM 20 MG: 20 TABLET, FILM COATED ORAL at 08:22

## 2018-09-24 RX ADMIN — HEPARIN SODIUM 5000 UNITS: 5000 INJECTION INTRAVENOUS; SUBCUTANEOUS at 22:17

## 2018-09-24 RX ADMIN — INSULIN LISPRO 1 UNITS: 100 INJECTION, SOLUTION INTRAVENOUS; SUBCUTANEOUS at 12:41

## 2018-09-24 RX ADMIN — ANTACID TABLETS 250 MG: 500 TABLET, CHEWABLE ORAL at 08:22

## 2018-09-24 RX ADMIN — HYDROCODONE BITARTRATE AND ACETAMINOPHEN 1 TABLET: 5; 325 TABLET ORAL at 12:59

## 2018-09-24 RX ADMIN — MEROPENEM 1 G: 1 INJECTION, POWDER, FOR SOLUTION INTRAVENOUS at 02:09

## 2018-09-24 RX ADMIN — ACETAMINOPHEN 1000 MG: 500 TABLET ORAL at 07:34

## 2018-09-24 RX ADMIN — AMIODARONE HYDROCHLORIDE 200 MG: 200 TABLET ORAL at 22:15

## 2018-09-24 RX ADMIN — ROPINIROLE HYDROCHLORIDE 2 MG: 2 TABLET, FILM COATED ORAL at 22:16

## 2018-09-24 RX ADMIN — CARBIDOPA AND LEVODOPA 1 TABLET: 25; 100 TABLET, EXTENDED RELEASE ORAL at 08:22

## 2018-09-24 RX ADMIN — ALPRAZOLAM 0.25 MG: 0.25 TABLET ORAL at 22:16

## 2018-09-24 RX ADMIN — CARBIDOPA AND LEVODOPA 1 TABLET: 25; 100 TABLET, EXTENDED RELEASE ORAL at 17:33

## 2018-09-24 ASSESSMENT — ENCOUNTER SYMPTOMS
RESPIRATORY NEGATIVE: 1
GASTROINTESTINAL NEGATIVE: 1

## 2018-09-24 ASSESSMENT — PAIN SCALES - GENERAL
PAINLEVEL_OUTOF10: 10
PAINLEVEL_OUTOF10: 8
PAINLEVEL_OUTOF10: 10
PAINLEVEL_OUTOF10: 0
PAINLEVEL_OUTOF10: 3
PAINLEVEL_OUTOF10: 10

## 2018-09-24 NOTE — PROGRESS NOTES
(sleep)  magnesium oxide (MAG-OX) 400 (241.3 Mg) MG TABS tablet, Take 1 tablet by mouth 2 times daily  calcitRIOL (ROCALTROL) 0.25 MCG capsule, TAKE 1 CAPSULE THREE TIMES A DAY  atorvastatin (LIPITOR) 20 MG tablet, TAKE 1 TABLET DAILY  ALPRAZolam (XANAX) 0.25 MG tablet, Take 1 tablet by mouth 3 times daily as needed for Anxiety for up to 90 days. .  butalbital-acetaminophen-caffeine (FIORICET, ESGIC) -40 MG per tablet, Take 1 tablet by mouth every 4 hours as needed for Headaches  carbidopa-levodopa (SINEMET CR)  MG per extended release tablet, Take 1 tablet by mouth 4 times daily  CALCIUM CITRATE PO, Take 500 mg by mouth 3 times daily   senna (SENOKOT) 8.6 MG tablet, Take 2 tablets by mouth daily  conjugated estrogens (PREMARIN) 0.625 MG/GM vaginal cream, Place 0.5 g vaginally every other day  aspirin 81 MG tablet, Take 81 mg by mouth daily   rOPINIRole (REQUIP) 2 MG tablet, Take 2 mg by mouth 3 times daily  pantoprazole (PROTONIX) 40 MG tablet, Take 1 tablet by mouth 2 times daily (before meals)  BREO ELLIPTA 100-25 MCG/INH AEPB inhaler, Inhale 1 puff into the lungs daily  ferrous sulfate 325 (65 Fe) MG EC tablet, Take 1 tablet by mouth 2 times daily (with meals)  vitamin D (CHOLECALCIFEROL) 5000 units CAPS capsule, Take 5,000 Units by mouth daily  rasagiline mesylate 1 MG TABS, Take 1 tablet by mouth daily

## 2018-09-24 NOTE — PROGRESS NOTES
24 hours) at 09/24/18 0716  Last data filed at 09/24/18 0622   Gross per 24 hour   Intake             1687 ml   Output             1725 ml   Net              -38 ml       Labs:    Hematology:  Recent Labs      09/22/18 2058 09/23/18 0620 09/24/18 0635   WBC  8.3  9.1  5.4   RBC  3.03*  2.48*  2.65*   HGB  10.0*  8.3*  8.8*   HCT  31.0*  25.1*  26.6*   MCV  102.4*  101.5*  100.4*   MCH  33.2  33.4  33.2   MCHC  32.4  32.9  33.1   RDW  15.9*  15.8*  15.5*   PLT  82*  71*  73*   MPV  9.4  9.4  9.1     Chemistry:  Recent Labs      09/22/18 2058 09/23/18 0620   NA  145*  140   K  4.2  4.1   CL  98  96*   CO2  31  35*   GLUCOSE  157*  126*   BUN  46*  48*   CREATININE  1.99*  2.31*   MG   --   2.0   ANIONGAP  16  9   LABGLOM  25*  21*   GFRAA  30*  25*   CALCIUM  10.1  9.1   CAION   --   1.24   PHOS   --   4.4   LACTACIDWB   --   NOT REPORTED     Recent Labs      09/22/18 2043 09/22/18 2058 09/23/18 0819   PROT   --   6.7   --    LABALBU   --   4.2   --    AST   --   19   --    ALT   --   7   --    ALKPHOS   --   80   --    BILITOT   --   0.26*   --    POCGLU  156*   --   103         Lab Results   Component Value Date/Time    SPECIAL NOT REPORTED 09/23/2018 12:21 AM     Lab Results   Component Value Date/Time    CULTURE NO GROWTH 21 HOURS 09/22/2018 10:30 PM       Lab Results   Component Value Date    POCPH 7.46 09/22/2018    POCPCO2 51 09/22/2018    POCPO2 73 09/22/2018    POCHCO3 36.0 09/22/2018    NBEA NOT REPORTED 09/22/2018    PBEA 12 09/22/2018    XAU1DNT 38 09/22/2018    DRLF7DFK 95 09/22/2018    FIO2 27.0 09/22/2018       Radiology:      Physical Examination:        General appearance:  alert, cooperative and no distress, on NC  Mental Status:  oriented to person, place and time and normal affect  Lungs:  clear to auscultation bilaterally, normal effort, slight wheezing bilaterally  Heart:  regular rate and rhythm, no murmur  Abdomen:  soft, nontender, nondistended, normal bowel

## 2018-09-24 NOTE — PROGRESS NOTES
[facility]  Progress Note    Gracie Betancourt  MRN: [id number]  Date: 9/24/2018    Reason for F/U :   E. coli urinary tract infection    Impression :   1. E. coli urinary tract infection the patient with a history of ESBL E. coli 6/20/18  2. Erythroderma right greater than left not likely cellulitis  3. Contaminated blood culture with coagulase-negative staph 1 of 2 sets    Recommendations:   · Continue intravenous antimicrobial therapy with meropenem for now  · Follow the sensitivity data for the E. coli in the urine  · The coagulase negative staph does not need to be treated  · The swelling in the lower extremities is markedly improved. Infection Control Recommendations:   Contact precautions    Discharge Planning:   Estimated Length of IV antimicrobials: To be determined  Patient will need Midline Catheter Insertion/ PICC line Insertion: To be determined  Patient will need: Home IV , [place],  SNF,  LTAC: Undetermined  Patient will need outpatient wound care: No    Medical Decision making / Summary of Stay:   Gracie Betancourt is a 72y.o.-year-old female who was initially admitted on 9/22/2018. She was recently hospitalized 8/20/18 with acute kidney injury, pneumonia/pneumonitis and patient was treated with IV Cleocin. She underwent an EGD - the esophagus was severely dilated and impacted with food,  severely inflamed with a large ulcer at the GE junction and candida esophagitis. She was discharged 9/7/18 to rehabilitation     She presented with urinary frequency, chills and left lower quadrant abdominal discomfort. She was started on meropenem even history of prior ESBL E. Coli urinary tract infection in June 2018      There was also concern for cellulitis.     Current evaluation:9/24/2018    BP (!) 136/44   Pulse 76   Temp 98.6 °F (37 °C) (Oral)   Resp 18   Ht 5' (1.524 m)   Wt 182 lb (82.6 kg)   LMP 04/03/2004 (Within Years)   SpO2 97%   BMI 35.54 kg/m²     Temperature Range:

## 2018-09-24 NOTE — PLAN OF CARE
[facility]    Second Visit Note  For more detailed information please refer to the progress note of the day      9/24/2018    5:46 PM    Name:   Jackie Donis  MRN:     [id number]     Acct:      [de-identified]   Room:   1003/1003-02   Day:  1  Admit Date:  9/22/2018  8:34 PM    PCP:   Abhishek Zee DO  Code Status:  Full Code        Pt vitals were reviewed   New labs were reviewed   Patient was seen    Updated plan :     1. No issues during day, feels better this afternoon  2. Sensitivities pending  3.  D/C planning in progress        Vishal Shaikh MD  9/24/2018  5:46 PM

## 2018-09-25 ENCOUNTER — HOSPITAL ENCOUNTER (OUTPATIENT)
Dept: INFUSION THERAPY | Facility: MEDICAL CENTER | Age: 73
End: 2018-09-25
Payer: COMMERCIAL

## 2018-09-25 ENCOUNTER — APPOINTMENT (OUTPATIENT)
Dept: INTERVENTIONAL RADIOLOGY/VASCULAR | Age: 73
DRG: 872 | End: 2018-09-25
Payer: COMMERCIAL

## 2018-09-25 LAB
ABSOLUTE EOS #: 0.1 K/UL (ref 0–0.4)
ABSOLUTE IMMATURE GRANULOCYTE: ABNORMAL K/UL (ref 0–0.3)
ABSOLUTE LYMPH #: 0.4 K/UL (ref 1–4.8)
ABSOLUTE MONO #: 0.6 K/UL (ref 0.2–0.8)
ANION GAP SERPL CALCULATED.3IONS-SCNC: 7 MMOL/L (ref 9–17)
BASOPHILS # BLD: 0 % (ref 0–2)
BASOPHILS ABSOLUTE: 0 K/UL (ref 0–0.2)
BUN BLDV-MCNC: 27 MG/DL (ref 8–23)
BUN/CREAT BLD: 14 (ref 9–20)
CALCIUM SERPL-MCNC: 8 MG/DL (ref 8.6–10.4)
CHLORIDE BLD-SCNC: 102 MMOL/L (ref 98–107)
CO2: 30 MMOL/L (ref 20–31)
CREAT SERPL-MCNC: 1.99 MG/DL (ref 0.5–0.9)
CULTURE: ABNORMAL
DIFFERENTIAL TYPE: ABNORMAL
EOSINOPHILS RELATIVE PERCENT: 2 % (ref 1–4)
GFR AFRICAN AMERICAN: 30 ML/MIN
GFR NON-AFRICAN AMERICAN: 25 ML/MIN
GFR SERPL CREATININE-BSD FRML MDRD: ABNORMAL ML/MIN/{1.73_M2}
GFR SERPL CREATININE-BSD FRML MDRD: ABNORMAL ML/MIN/{1.73_M2}
GLUCOSE BLD-MCNC: 109 MG/DL (ref 65–105)
GLUCOSE BLD-MCNC: 116 MG/DL (ref 65–105)
GLUCOSE BLD-MCNC: 146 MG/DL (ref 65–105)
GLUCOSE BLD-MCNC: 148 MG/DL (ref 65–105)
GLUCOSE BLD-MCNC: 150 MG/DL (ref 70–99)
GLUCOSE BLD-MCNC: 155 MG/DL (ref 65–105)
HCT VFR BLD CALC: 24.5 % (ref 36–46)
HEMOGLOBIN: 8.1 G/DL (ref 12–16)
IMMATURE GRANULOCYTES: ABNORMAL %
LYMPHOCYTES # BLD: 8 % (ref 24–44)
Lab: ABNORMAL
MCH RBC QN AUTO: 33.2 PG (ref 26–34)
MCHC RBC AUTO-ENTMCNC: 32.8 G/DL (ref 31–37)
MCV RBC AUTO: 101.3 FL (ref 80–100)
MONOCYTES # BLD: 14 % (ref 1–7)
NRBC AUTOMATED: ABNORMAL PER 100 WBC
ORGANISM: ABNORMAL
PDW BLD-RTO: 14.3 % (ref 11.5–14.5)
PLATELET # BLD: 75 K/UL (ref 130–400)
PLATELET ESTIMATE: ABNORMAL
PMV BLD AUTO: ABNORMAL FL (ref 6–12)
POTASSIUM SERPL-SCNC: 4.2 MMOL/L (ref 3.7–5.3)
PROCALCITONIN: 2.27 NG/ML
RBC # BLD: 2.42 M/UL (ref 4–5.2)
RBC # BLD: ABNORMAL 10*6/UL
SEG NEUTROPHILS: 76 % (ref 36–66)
SEGMENTED NEUTROPHILS ABSOLUTE COUNT: 3.4 K/UL (ref 1.8–7.7)
SODIUM BLD-SCNC: 139 MMOL/L (ref 135–144)
SPECIMEN DESCRIPTION: ABNORMAL
STATUS: ABNORMAL
WBC # BLD: 4.5 K/UL (ref 3.5–11)
WBC # BLD: ABNORMAL 10*3/UL

## 2018-09-25 PROCEDURE — 85025 COMPLETE CBC W/AUTO DIFF WBC: CPT

## 2018-09-25 PROCEDURE — 82947 ASSAY GLUCOSE BLOOD QUANT: CPT

## 2018-09-25 PROCEDURE — 6370000000 HC RX 637 (ALT 250 FOR IP): Performed by: FAMILY MEDICINE

## 2018-09-25 PROCEDURE — G8987 SELF CARE CURRENT STATUS: HCPCS

## 2018-09-25 PROCEDURE — 36415 COLL VENOUS BLD VENIPUNCTURE: CPT

## 2018-09-25 PROCEDURE — 97530 THERAPEUTIC ACTIVITIES: CPT

## 2018-09-25 PROCEDURE — 80048 BASIC METABOLIC PNL TOTAL CA: CPT

## 2018-09-25 PROCEDURE — 6360000002 HC RX W HCPCS: Performed by: INTERNAL MEDICINE

## 2018-09-25 PROCEDURE — 6370000000 HC RX 637 (ALT 250 FOR IP): Performed by: NURSE PRACTITIONER

## 2018-09-25 PROCEDURE — 02HV33Z INSERTION OF INFUSION DEVICE INTO SUPERIOR VENA CAVA, PERCUTANEOUS APPROACH: ICD-10-PCS | Performed by: INTERNAL MEDICINE

## 2018-09-25 PROCEDURE — 99232 SBSQ HOSP IP/OBS MODERATE 35: CPT | Performed by: INTERNAL MEDICINE

## 2018-09-25 PROCEDURE — 2580000003 HC RX 258: Performed by: NURSE PRACTITIONER

## 2018-09-25 PROCEDURE — 97166 OT EVAL MOD COMPLEX 45 MIN: CPT

## 2018-09-25 PROCEDURE — 97110 THERAPEUTIC EXERCISES: CPT

## 2018-09-25 PROCEDURE — 94640 AIRWAY INHALATION TREATMENT: CPT

## 2018-09-25 PROCEDURE — G8988 SELF CARE GOAL STATUS: HCPCS

## 2018-09-25 PROCEDURE — 97116 GAIT TRAINING THERAPY: CPT

## 2018-09-25 PROCEDURE — 2060000000 HC ICU INTERMEDIATE R&B

## 2018-09-25 PROCEDURE — 2580000003 HC RX 258: Performed by: FAMILY MEDICINE

## 2018-09-25 PROCEDURE — 6360000002 HC RX W HCPCS: Performed by: FAMILY MEDICINE

## 2018-09-25 PROCEDURE — 97535 SELF CARE MNGMENT TRAINING: CPT

## 2018-09-25 PROCEDURE — 6370000000 HC RX 637 (ALT 250 FOR IP): Performed by: INTERNAL MEDICINE

## 2018-09-25 RX ADMIN — MEROPENEM 1 G: 1 INJECTION, POWDER, FOR SOLUTION INTRAVENOUS at 09:58

## 2018-09-25 RX ADMIN — MAGNESIUM OXIDE TAB 400 MG (241.3 MG ELEMENTAL MG) 400 MG: 400 (241.3 MG) TAB at 09:58

## 2018-09-25 RX ADMIN — ALBUTEROL SULFATE 2.5 MG: 2.5 SOLUTION RESPIRATORY (INHALATION) at 19:48

## 2018-09-25 RX ADMIN — RASAGILINE 1 MG: 0.5 TABLET ORAL at 09:58

## 2018-09-25 RX ADMIN — ANTACID TABLETS 250 MG: 500 TABLET, CHEWABLE ORAL at 09:58

## 2018-09-25 RX ADMIN — PANTOPRAZOLE SODIUM 40 MG: 40 TABLET, DELAYED RELEASE ORAL at 06:34

## 2018-09-25 RX ADMIN — CARBIDOPA AND LEVODOPA 1 TABLET: 25; 100 TABLET, EXTENDED RELEASE ORAL at 10:04

## 2018-09-25 RX ADMIN — CHOLECALCIFEROL CAP 125 MCG (5000 UNIT) 5000 UNITS: 125 CAP at 09:58

## 2018-09-25 RX ADMIN — FERROUS SULFATE TAB EC 325 MG (65 MG FE EQUIVALENT) 325 MG: 325 (65 FE) TABLET DELAYED RESPONSE at 17:05

## 2018-09-25 RX ADMIN — PANTOPRAZOLE SODIUM 40 MG: 40 TABLET, DELAYED RELEASE ORAL at 17:05

## 2018-09-25 RX ADMIN — MEROPENEM 1 G: 1 INJECTION, POWDER, FOR SOLUTION INTRAVENOUS at 02:45

## 2018-09-25 RX ADMIN — ROPINIROLE HYDROCHLORIDE 2 MG: 2 TABLET, FILM COATED ORAL at 09:58

## 2018-09-25 RX ADMIN — ROPINIROLE HYDROCHLORIDE 2 MG: 2 TABLET, FILM COATED ORAL at 12:48

## 2018-09-25 RX ADMIN — CALCITRIOL 0.25 MCG: 0.25 CAPSULE ORAL at 09:58

## 2018-09-25 RX ADMIN — CARBIDOPA AND LEVODOPA 1 TABLET: 25; 100 TABLET, EXTENDED RELEASE ORAL at 20:34

## 2018-09-25 RX ADMIN — SODIUM CHLORIDE: 9 INJECTION, SOLUTION INTRAVENOUS at 17:04

## 2018-09-25 RX ADMIN — ANTACID TABLETS 250 MG: 500 TABLET, CHEWABLE ORAL at 12:48

## 2018-09-25 RX ADMIN — LINAGLIPTIN 5 MG: 5 TABLET, FILM COATED ORAL at 09:58

## 2018-09-25 RX ADMIN — CARBIDOPA AND LEVODOPA 1 TABLET: 25; 100 TABLET, EXTENDED RELEASE ORAL at 12:48

## 2018-09-25 RX ADMIN — INSULIN LISPRO 1 UNITS: 100 INJECTION, SOLUTION INTRAVENOUS; SUBCUTANEOUS at 12:48

## 2018-09-25 RX ADMIN — DARBEPOETIN ALFA 100 MCG: 100 INJECTION, SOLUTION INTRAVENOUS; SUBCUTANEOUS at 11:34

## 2018-09-25 RX ADMIN — MEROPENEM 1 G: 1 INJECTION, POWDER, FOR SOLUTION INTRAVENOUS at 17:04

## 2018-09-25 RX ADMIN — CALCITRIOL 0.25 MCG: 0.25 CAPSULE ORAL at 12:48

## 2018-09-25 RX ADMIN — HYDROCODONE BITARTRATE AND ACETAMINOPHEN 1 TABLET: 5; 325 TABLET ORAL at 14:17

## 2018-09-25 RX ADMIN — INSULIN LISPRO 1 UNITS: 100 INJECTION, SOLUTION INTRAVENOUS; SUBCUTANEOUS at 20:35

## 2018-09-25 RX ADMIN — ROPINIROLE HYDROCHLORIDE 2 MG: 2 TABLET, FILM COATED ORAL at 20:34

## 2018-09-25 RX ADMIN — CALCITRIOL 0.25 MCG: 0.25 CAPSULE ORAL at 20:34

## 2018-09-25 RX ADMIN — METOPROLOL SUCCINATE 25 MG: 25 TABLET, FILM COATED, EXTENDED RELEASE ORAL at 09:58

## 2018-09-25 RX ADMIN — MAGNESIUM OXIDE TAB 400 MG (241.3 MG ELEMENTAL MG) 400 MG: 400 (241.3 MG) TAB at 20:34

## 2018-09-25 RX ADMIN — ATORVASTATIN CALCIUM 20 MG: 20 TABLET, FILM COATED ORAL at 10:04

## 2018-09-25 RX ADMIN — FERROUS SULFATE TAB EC 325 MG (65 MG FE EQUIVALENT) 325 MG: 325 (65 FE) TABLET DELAYED RESPONSE at 10:03

## 2018-09-25 RX ADMIN — AMIODARONE HYDROCHLORIDE 200 MG: 200 TABLET ORAL at 09:58

## 2018-09-25 RX ADMIN — CARBIDOPA AND LEVODOPA 1 TABLET: 25; 100 TABLET, EXTENDED RELEASE ORAL at 17:05

## 2018-09-25 RX ADMIN — ASPIRIN 81 MG: 81 TABLET, COATED ORAL at 09:58

## 2018-09-25 RX ADMIN — SENNOSIDES 17.2 MG: 8.6 TABLET, FILM COATED ORAL at 09:58

## 2018-09-25 RX ADMIN — HYDROCODONE BITARTRATE AND ACETAMINOPHEN 1 TABLET: 5; 325 TABLET ORAL at 02:45

## 2018-09-25 RX ADMIN — ANTACID TABLETS 250 MG: 500 TABLET, CHEWABLE ORAL at 20:34

## 2018-09-25 RX ADMIN — AMIODARONE HYDROCHLORIDE 200 MG: 200 TABLET ORAL at 20:34

## 2018-09-25 RX ADMIN — HEPARIN SODIUM 5000 UNITS: 5000 INJECTION INTRAVENOUS; SUBCUTANEOUS at 06:34

## 2018-09-25 ASSESSMENT — ENCOUNTER SYMPTOMS
GASTROINTESTINAL NEGATIVE: 1
RESPIRATORY NEGATIVE: 1

## 2018-09-25 ASSESSMENT — PAIN SCALES - GENERAL
PAINLEVEL_OUTOF10: 10
PAINLEVEL_OUTOF10: 0
PAINLEVEL_OUTOF10: 7

## 2018-09-25 NOTE — PROGRESS NOTES
Examination:        General appearance:  alert, cooperative and no distress, on NC  Mental Status:  oriented to person, place and time and normal affect  Lungs:  clear to auscultation bilaterally, normal effort, slight wheezing bilaterally  Heart:  regular rate and rhythm, no murmur  Abdomen:  soft, nontender, nondistended, normal bowel sounds  Extremities:  no edema, tenderness in the calves  Skin:  no gross lesions, rashes, induration    Assessment:        Primary Problem  Sepsis due to urinary tract infection [facility])    Active Hospital Problems    Diagnosis Date Noted    History of ESBL E. coli infection [Z86.19]     Stage 4 chronic kidney disease ([facility] [address] [facility] [N18.4]     Sepsis due to urinary tract infection ([facility] [A41.9, N39.0] 09/23/2018    Chronic respiratory failure (HCC) [J96.10] 09/23/2018    Chronic diastolic congestive heart failure (HCC) [I50.32] 09/23/2018    Thrombocytopenia ([address] [D69.6] 08/25/2018    Acute kidney injury superimposed on chronic kidney disease ([facility] [address] [N17.9, N18.9] 08/20/2018    Type 2 diabetes mellitus without complication, without long-term current use of insulin (HCC) [E11.9]        Plan:        - ID following - Continue meropenem for history of ESBL (stop date 10/2)  - Monitor creatinine, hx of CKD with baseline creatinine 1.8-2.0  - Hold home diuretics, continue IVF  - Monitor intake/output  - Resume home medications  - Continue supplemental home oxygen  - PT/OT  - AC on hold given low platelets, continue EPC cuffs  - D/C planning - CM working on SNF placement for IV abx        Akiko Powell MD  9/25/2018  10:20 AM

## 2018-09-25 NOTE — SIGNIFICANT EVENT
kidney injury superimposed on chronic kidney disease ([facility] [N17.9, N18.9] 08/20/2018    Type 2 diabetes mellitus without complication, without long-term current use of insulin (HCC) [E11.9]        Medications: Allergies: Allergies   Allergen Reactions    Dye [Barium-Containing Compounds] Other (See Comments)     Cause Afib per     Pcn [Penicillins] Itching and Swelling    Bactrim [Sulfamethoxazole-Trimethoprim] Other (See Comments)     Allergic Nephritis    Red Dye Itching and Rash     This allergy is likely incorrect:  Per patient's  she has no issue with medications that have red dye in them or red food.  He thinks this reaction was added to chart when the pt had a colonoscopy (possibly barium or iodine dye instead)       Current Meds:   Scheduled Meds:    albuterol  2.5 mg Nebulization TID    amiodarone  200 mg Oral BID    aspirin  81 mg Oral Daily    atorvastatin  20 mg Oral Daily    calcitRIOL  0.25 mcg Oral TID    carbidopa-levodopa  1 tablet Oral 4x Daily    conjugated estrogens  0.5 g Vaginal Every Other Day    ferrous sulfate  325 mg Oral BID WC    linagliptin  5 mg Oral Daily    magnesium oxide  400 mg Oral BID    metoprolol succinate  25 mg Oral Daily    pantoprazole  40 mg Oral BID AC    rasagiline  1 mg Oral Daily    rOPINIRole  2 mg Oral TID    senna  2 tablet Oral Daily    vitamin D  5,000 Units Oral Daily    insulin lispro  0-6 Units Subcutaneous TID WC    insulin lispro  0-3 Units Subcutaneous Nightly    vancomycin (VANCOCIN) intermittent dosing (placeholder)   Other RX Placeholder    calcium carbonate  250 mg Oral TID    tiotropium  18 mcg Inhalation Daily    meropenem  1 g Intravenous Q8H    heparin (porcine)  5,000 Units Subcutaneous 3 times per day    fluticasone-vilanterol  1 puff Inhalation Daily    sodium chloride flush  10 mL Intravenous 2 times per day     Continuous Infusions:    dextrose      sodium chloride 50 mL/hr at 09/24/18 3443

## 2018-09-25 NOTE — PROGRESS NOTES
benefit from a short stay to also assist with her IV antibiotics that will need to run for 7 days per SW. Angela balance assessment performed this am and patient scored a 14 which makes her a high fall risk and high readmission risk.   Prognosis: Good        OutComes Score    AM-PAC Score     AM-PAC Inpatient Mobility without Stair Climbing Raw Score : 14  AM-PAC Inpatient without Stair Climbing T-Scale Score : 40.85  Mobility Inpatient CMS 0-100% Score: 53.33  Mobility Inpatient without Stair CMS G-Code Modifier : CK       Goals  Short term goals  Time Frame for Short term goals: 7 days  Short term goal 1: I bed mobility  Short term goal 2: I transfers  Short term goal 3: I gait using walker   Short term goal 4: I stair negotiation 2 steps   Short term goal 5: 20-30 reps AROM exs for improved mobility  Patient Goals   Patient goals : to go home ASAP    Plan    Plan  Times per week: 1-2 x/day, 5-6 days/week  Current Treatment Recommendations: Strengthening, Balance Training, Stair training, Gait Training, Transfer Training  Safety Devices  Type of devices: Call light within reach, Gait belt, Left in chair (Daughter present)  Restraints  Initially in place: No     Therapy Time   Individual returned Group Co-treatment   Time In 0844  0929       Time Out 0902  1011       Minutes 18  0459 [facility]

## 2018-09-25 NOTE — PLAN OF CARE
[address]   Nighttime In-House Nurse Practitioner      9/25/2018    6:39 AM    Name:   Jones Maldonado  MRN:     [id number]     [place]:      [de-identified]   Room:   [address] [place] [address] Day:  2  Admit Date:  9/22/2018  8:34 PM    PCP:   Phill Floyd DO  Code Status:  Full Code    Called to the floor by staff to evaluate Jones Maldonado in [address] [place] [address] at 6:39 AM with request to review labs and medications. Assessment/Plan:     Called by RN to evaluate patient. Platelets 75 this AM. Patient currently receiving heparin subq 5000u 3x/day. Upon chart review, patient had thrombocytopenia during previous admission. Heparin was stopped and Dr. Lian Roberson with cardiology contested that the patient is not an anticoagulation candidate; patient has history of GI bleed previously with anticoagulation (Note 8/25/18). Heparin discontinued at this time. Monitor platelets and for s/s of bleeding. EPC cuffs. Additional medical information reviewed:  Previous admission intermed and cardiology notes. Problem List:        Primary Problem  Sepsis due to urinary tract infection [facility])    Active Hospital Problems    Diagnosis Date Noted    History of ESBL E. coli infection [Z86.19]     Stage 4 chronic kidney disease ([address] [N18.4]     Sepsis due to urinary tract infection ([address] [A41.9, N39.0] 09/23/2018    Chronic respiratory failure ([address] [J96.10] 09/23/2018    Chronic diastolic congestive heart failure ([address] [I50.32] 09/23/2018    Thrombocytopenia ([facility] [D69.6] 08/25/2018    Acute kidney injury superimposed on chronic kidney disease ([address] [N17.9, N18.9] 08/20/2018    Type 2 diabetes mellitus without complication, without long-term current use of insulin ([facility]) [E11.9]        Medications: Allergies:     Allergies   Allergen Reactions    Dye [Barium-Containing Compounds] Other (See Comments)     Cause Afib per     Pcn [Penicillins] Itching and Swelling    Bactrim CREATININE  2.31*  1.95*  1.99*   MG  2.0   --    --    ANIONGAP  9  10  7*   LABGLOM  21*  25*  25*   GFRAA  25*  31*  30*   CALCIUM  9.1  8.5*  8.0*   CAION  1.24   --    --    PHOS  4.4   --    --    LACTACIDWB  NOT REPORTED   --    --      Recent Labs      09/22/18 2058 09/23/18   1956  09/24/18   0731  09/24/18   1203  09/24/18   1637  09/24/18   2043  09/25/18   0155   PROT  6.7   --    --    --    --    --    --    --    LABALBU  4.2   --    --    --    --    --    --    --    AST  19   --    --    --    --    --    --    --    ALT  7   --    --    --    --    --    --    --    ALKPHOS  80   --    --    --    --    --    --    --    BILITOT  0.26*   --    --    --    --    --    --    --    POCGLU   --    < >  131*  125*  142*  121*  172*  155*    < > = values in this interval not displayed.          Lab Results   Component Value Date/Time    SPECIAL NOT REPORTED 09/23/2018 12:21 AM     Lab Results   Component Value Date/Time    CULTURE NO GROWTH 2 DAYS 09/22/2018 10:30 PM       Lab Results   Component Value Date    POCPH 7.46 09/22/2018    POCPCO2 51 09/22/2018    POCPO2 73 09/22/2018    POCHCO3 36.0 09/22/2018    NBEA NOT REPORTED 09/22/2018    PBEA 12 09/22/2018    OYN9FCV 38 09/22/2018    UUCF3PTU 95 09/22/2018    FIO2 27.0 09/22/2018       Radiology:    N/A    ANSHUL Galindo - CNP  9/25/2018  6:39 AM

## 2018-09-25 NOTE — PROGRESS NOTES
[facility]  Progress Note    Mor Meyers  MRN: [id number]  Date: 9/25/2018    Reason for F/U :   E. coli urinary tract infection    Impression :   1. ESBL E. coli urinary tract infection   2. Erythroderma right greater than left not likely cellulitis  3. Contaminated blood culture with coagulase-negative staph 1 of 2 sets    Recommendations:   · Continue meropenem through October 2, 2018  · The coagulase negative staph does not need to be treated  · The swelling in the lower extremities is markedly improved, and has more mottling rather than cellulitis  · Plans are for discharge on IV antimicrobial therapy and I'll have a PICC line placed. Infection Control Recommendations:   Contact precautions    Discharge Planning:   Estimated Length of IV antimicrobials: October 2, 2018  Patient will need Midline Catheter Insertion  Patient will need: Home IV , Thiago,  SNF :Undetermined  Patient will need outpatient wound care: No    Medical Decision making / Summary of Stay:   Mor Meyers is a 72y.o.-year-old female who was initially admitted on 9/22/2018. She was recently hospitalized 8/20/18 with acute kidney injury, pneumonia/pneumonitis and patient was treated with IV Cleocin. She underwent an EGD - the esophagus was severely dilated and impacted with food,  severely inflamed with a large ulcer at the GE junction and candida esophagitis. She was discharged 9/7/18 to rehabilitation     She presented with urinary frequency, chills and left lower quadrant abdominal discomfort. She was started on meropenem even history of prior ESBL E. Coli urinary tract infection in June 2018      There was also concern for cellulitis.     Current evaluation:9/25/2018    BP (!) 126/52   Pulse 76   Temp 98.2 °F (36.8 °C) (Oral)   Resp 20   Ht 5' (1.524 m)   Wt 182 lb (82.6 kg)   LMP 04/03/2004 (Within Years)   SpO2 99%   BMI 35.54 kg/m²     Temperature Range: Temp: 98.2 °F (36.8 °C) Temp  Avg: 98.4 °F (36.9 °C)  Min: 98.2 °F (36.8 °C)  Max: 98.8 °F (37.1 °C)   The patient is seen and evaluated at bedside she continues to feel poorly though she cannot quite say exactly what the problem is. She is sitting up in the chair the time of my evaluation. Does not have any fever or chills. Review of Systems   Constitutional: Positive for malaise/fatigue. Negative for chills and fever. Respiratory: Negative. Cardiovascular: Negative. Gastrointestinal: Negative. Genitourinary: Positive for frequency. Physical Examination :     Physical Exam   Constitutional: She is oriented to person, place, and time. HENT:   Head: Normocephalic and atraumatic. Cardiovascular: Regular rhythm and normal heart sounds. Pulmonary/Chest: Effort normal and breath sounds normal.   Abdominal: Soft. Bowel sounds are normal.   Obese protuberant abdomen   Neurological: She is alert and oriented to person, place, and time. Skin:   The some mild scattered patchy erythematous areas mostly on the left lower extremity. Laboratory data:   I have independently reviewed the following labs:  CBC with Differential:   Recent Labs      09/24/18   0635  09/25/18   0521   WBC  5.4  4.5   HGB  8.8*  8.1*   HCT  26.6*  24.5*   PLT  73*  75*   LYMPHOPCT  7*  8*   MONOPCT  11*  14*     BMP:   Recent Labs      09/23/18   0620  09/24/18   0635  09/25/18   0521   NA  140  144  139   K  4.1  4.2  4.2   CL  96*  102  102   CO2  35*  32*  30   BUN  48*  34*  27*   CREATININE  2.31*  1.95*  1.99*   MG  2.0   --    --      Hepatic Function Panel:   Recent Labs      09/22/18 2058   PROT  6.7   LABALBU  4.2   BILITOT  0.26*   ALKPHOS  80   ALT  7   AST  19     No results for input(s): PHONG in the last 72 hours.    Lab Results   Component Value Date    CRP 3.1 06/12/2013     Lab Results   Component Value Date    SEDRATE 79 (H) 08/24/2018       Imaging Studies:   No new imaging    Cultures:     Culture blood #1 [[id number]] (Abnormal)

## 2018-09-26 ENCOUNTER — APPOINTMENT (OUTPATIENT)
Dept: INTERVENTIONAL RADIOLOGY/VASCULAR | Age: 73
DRG: 872 | End: 2018-09-26
Payer: COMMERCIAL

## 2018-09-26 LAB
ABSOLUTE EOS #: 0.1 K/UL (ref 0–0.4)
ABSOLUTE IMMATURE GRANULOCYTE: ABNORMAL K/UL (ref 0–0.3)
ABSOLUTE LYMPH #: 0.4 K/UL (ref 1–4.8)
ABSOLUTE MONO #: 0.7 K/UL (ref 0.2–0.8)
ANION GAP SERPL CALCULATED.3IONS-SCNC: 10 MMOL/L (ref 9–17)
BASOPHILS # BLD: 0 % (ref 0–2)
BASOPHILS ABSOLUTE: 0 K/UL (ref 0–0.2)
BUN BLDV-MCNC: 25 MG/DL (ref 8–23)
BUN/CREAT BLD: 12 (ref 9–20)
CALCIUM SERPL-MCNC: 8 MG/DL (ref 8.6–10.4)
CHLORIDE BLD-SCNC: 100 MMOL/L (ref 98–107)
CO2: 28 MMOL/L (ref 20–31)
CREAT SERPL-MCNC: 2.07 MG/DL (ref 0.5–0.9)
CULTURE: ABNORMAL
DIFFERENTIAL TYPE: ABNORMAL
EOSINOPHILS RELATIVE PERCENT: 3 % (ref 1–4)
GFR AFRICAN AMERICAN: 29 ML/MIN
GFR NON-AFRICAN AMERICAN: 24 ML/MIN
GFR SERPL CREATININE-BSD FRML MDRD: ABNORMAL ML/MIN/{1.73_M2}
GFR SERPL CREATININE-BSD FRML MDRD: ABNORMAL ML/MIN/{1.73_M2}
GLUCOSE BLD-MCNC: 106 MG/DL (ref 65–105)
GLUCOSE BLD-MCNC: 109 MG/DL (ref 70–99)
GLUCOSE BLD-MCNC: 132 MG/DL (ref 65–105)
GLUCOSE BLD-MCNC: 164 MG/DL (ref 65–105)
GLUCOSE BLD-MCNC: 200 MG/DL (ref 65–105)
HCT VFR BLD CALC: 25.4 % (ref 36–46)
HEMOGLOBIN: 8.3 G/DL (ref 12–16)
IMMATURE GRANULOCYTES: ABNORMAL %
LYMPHOCYTES # BLD: 13 % (ref 24–44)
Lab: ABNORMAL
MCH RBC QN AUTO: 33.4 PG (ref 26–34)
MCHC RBC AUTO-ENTMCNC: 32.8 G/DL (ref 31–37)
MCV RBC AUTO: 101.9 FL (ref 80–100)
MONOCYTES # BLD: 20 % (ref 1–7)
NRBC AUTOMATED: ABNORMAL PER 100 WBC
PDW BLD-RTO: 14.4 % (ref 11.5–14.5)
PLATELET # BLD: 92 K/UL (ref 130–400)
PLATELET ESTIMATE: ABNORMAL
PMV BLD AUTO: ABNORMAL FL (ref 6–12)
POTASSIUM SERPL-SCNC: 4.4 MMOL/L (ref 3.7–5.3)
RBC # BLD: 2.5 M/UL (ref 4–5.2)
RBC # BLD: ABNORMAL 10*6/UL
SEG NEUTROPHILS: 64 % (ref 36–66)
SEGMENTED NEUTROPHILS ABSOLUTE COUNT: 2.2 K/UL (ref 1.8–7.7)
SODIUM BLD-SCNC: 138 MMOL/L (ref 135–144)
SPECIMEN DESCRIPTION: ABNORMAL
STATUS: ABNORMAL
WBC # BLD: 3.4 K/UL (ref 3.5–11)
WBC # BLD: ABNORMAL 10*3/UL

## 2018-09-26 PROCEDURE — 6360000002 HC RX W HCPCS: Performed by: FAMILY MEDICINE

## 2018-09-26 PROCEDURE — 36569 INSJ PICC 5 YR+ W/O IMAGING: CPT

## 2018-09-26 PROCEDURE — 99232 SBSQ HOSP IP/OBS MODERATE 35: CPT | Performed by: INTERNAL MEDICINE

## 2018-09-26 PROCEDURE — 6370000000 HC RX 637 (ALT 250 FOR IP): Performed by: INTERNAL MEDICINE

## 2018-09-26 PROCEDURE — 2580000003 HC RX 258: Performed by: FAMILY MEDICINE

## 2018-09-26 PROCEDURE — 97116 GAIT TRAINING THERAPY: CPT

## 2018-09-26 PROCEDURE — 76937 US GUIDE VASCULAR ACCESS: CPT

## 2018-09-26 PROCEDURE — 84145 PROCALCITONIN (PCT): CPT

## 2018-09-26 PROCEDURE — 6370000000 HC RX 637 (ALT 250 FOR IP): Performed by: NURSE PRACTITIONER

## 2018-09-26 PROCEDURE — 85025 COMPLETE CBC W/AUTO DIFF WBC: CPT

## 2018-09-26 PROCEDURE — 80048 BASIC METABOLIC PNL TOTAL CA: CPT

## 2018-09-26 PROCEDURE — 94640 AIRWAY INHALATION TREATMENT: CPT

## 2018-09-26 PROCEDURE — 2700000000 HC OXYGEN THERAPY PER DAY

## 2018-09-26 PROCEDURE — 97530 THERAPEUTIC ACTIVITIES: CPT

## 2018-09-26 PROCEDURE — 82947 ASSAY GLUCOSE BLOOD QUANT: CPT

## 2018-09-26 PROCEDURE — C1751 CATH, INF, PER/CENT/MIDLINE: HCPCS

## 2018-09-26 PROCEDURE — 6370000000 HC RX 637 (ALT 250 FOR IP): Performed by: FAMILY MEDICINE

## 2018-09-26 PROCEDURE — 97110 THERAPEUTIC EXERCISES: CPT

## 2018-09-26 PROCEDURE — 2060000000 HC ICU INTERMEDIATE R&B

## 2018-09-26 PROCEDURE — 94760 N-INVAS EAR/PLS OXIMETRY 1: CPT

## 2018-09-26 PROCEDURE — 6360000002 HC RX W HCPCS: Performed by: INTERNAL MEDICINE

## 2018-09-26 PROCEDURE — 36415 COLL VENOUS BLD VENIPUNCTURE: CPT

## 2018-09-26 RX ADMIN — FERROUS SULFATE TAB EC 325 MG (65 MG FE EQUIVALENT) 325 MG: 325 (65 FE) TABLET DELAYED RESPONSE at 08:27

## 2018-09-26 RX ADMIN — ASPIRIN 81 MG: 81 TABLET, COATED ORAL at 08:26

## 2018-09-26 RX ADMIN — PANTOPRAZOLE SODIUM 40 MG: 40 TABLET, DELAYED RELEASE ORAL at 15:32

## 2018-09-26 RX ADMIN — CALCITRIOL 0.25 MCG: 0.25 CAPSULE ORAL at 19:50

## 2018-09-26 RX ADMIN — ANTACID TABLETS 250 MG: 500 TABLET, CHEWABLE ORAL at 19:58

## 2018-09-26 RX ADMIN — AMIODARONE HYDROCHLORIDE 200 MG: 200 TABLET ORAL at 19:50

## 2018-09-26 RX ADMIN — MEROPENEM 1 G: 1 INJECTION, POWDER, FOR SOLUTION INTRAVENOUS at 17:48

## 2018-09-26 RX ADMIN — ALBUTEROL SULFATE 2.5 MG: 2.5 SOLUTION RESPIRATORY (INHALATION) at 08:23

## 2018-09-26 RX ADMIN — ALPRAZOLAM 0.25 MG: 0.25 TABLET ORAL at 01:48

## 2018-09-26 RX ADMIN — MAGNESIUM OXIDE TAB 400 MG (241.3 MG ELEMENTAL MG) 400 MG: 400 (241.3 MG) TAB at 19:50

## 2018-09-26 RX ADMIN — AMIODARONE HYDROCHLORIDE 200 MG: 200 TABLET ORAL at 08:27

## 2018-09-26 RX ADMIN — ACETAMINOPHEN 1000 MG: 500 TABLET ORAL at 15:41

## 2018-09-26 RX ADMIN — MAGNESIUM OXIDE TAB 400 MG (241.3 MG ELEMENTAL MG) 400 MG: 400 (241.3 MG) TAB at 08:26

## 2018-09-26 RX ADMIN — FERROUS SULFATE TAB EC 325 MG (65 MG FE EQUIVALENT) 325 MG: 325 (65 FE) TABLET DELAYED RESPONSE at 16:50

## 2018-09-26 RX ADMIN — TIOTROPIUM BROMIDE 18 MCG: 18 CAPSULE ORAL; RESPIRATORY (INHALATION) at 08:23

## 2018-09-26 RX ADMIN — INSULIN LISPRO 2 UNITS: 100 INJECTION, SOLUTION INTRAVENOUS; SUBCUTANEOUS at 16:57

## 2018-09-26 RX ADMIN — CALCITRIOL 0.25 MCG: 0.25 CAPSULE ORAL at 15:38

## 2018-09-26 RX ADMIN — MEROPENEM 1 G: 1 INJECTION, POWDER, FOR SOLUTION INTRAVENOUS at 01:48

## 2018-09-26 RX ADMIN — CARBIDOPA AND LEVODOPA 1 TABLET: 25; 100 TABLET, EXTENDED RELEASE ORAL at 15:38

## 2018-09-26 RX ADMIN — ROPINIROLE HYDROCHLORIDE 2 MG: 2 TABLET, FILM COATED ORAL at 19:50

## 2018-09-26 RX ADMIN — ATORVASTATIN CALCIUM 20 MG: 20 TABLET, FILM COATED ORAL at 08:27

## 2018-09-26 RX ADMIN — CHOLECALCIFEROL CAP 125 MCG (5000 UNIT) 5000 UNITS: 125 CAP at 08:27

## 2018-09-26 RX ADMIN — CARBIDOPA AND LEVODOPA 1 TABLET: 25; 100 TABLET, EXTENDED RELEASE ORAL at 08:27

## 2018-09-26 RX ADMIN — CARBIDOPA AND LEVODOPA 1 TABLET: 25; 100 TABLET, EXTENDED RELEASE ORAL at 19:49

## 2018-09-26 RX ADMIN — CALCITRIOL 0.25 MCG: 0.25 CAPSULE ORAL at 08:27

## 2018-09-26 RX ADMIN — INSULIN LISPRO 1 UNITS: 100 INJECTION, SOLUTION INTRAVENOUS; SUBCUTANEOUS at 21:36

## 2018-09-26 RX ADMIN — ALBUTEROL SULFATE 2.5 MG: 2.5 SOLUTION RESPIRATORY (INHALATION) at 19:10

## 2018-09-26 RX ADMIN — MEROPENEM 1 G: 1 INJECTION, POWDER, FOR SOLUTION INTRAVENOUS at 10:25

## 2018-09-26 RX ADMIN — SENNOSIDES 17.2 MG: 8.6 TABLET, FILM COATED ORAL at 08:27

## 2018-09-26 RX ADMIN — ALPRAZOLAM 0.25 MG: 0.25 TABLET ORAL at 23:14

## 2018-09-26 RX ADMIN — METOPROLOL SUCCINATE 25 MG: 25 TABLET, FILM COATED, EXTENDED RELEASE ORAL at 08:27

## 2018-09-26 RX ADMIN — HYDROCODONE BITARTRATE AND ACETAMINOPHEN 1 TABLET: 5; 325 TABLET ORAL at 05:32

## 2018-09-26 RX ADMIN — ROPINIROLE HYDROCHLORIDE 2 MG: 2 TABLET, FILM COATED ORAL at 15:46

## 2018-09-26 RX ADMIN — ALPRAZOLAM 0.25 MG: 0.25 TABLET ORAL at 15:42

## 2018-09-26 RX ADMIN — ANTACID TABLETS 250 MG: 500 TABLET, CHEWABLE ORAL at 15:38

## 2018-09-26 RX ADMIN — PANTOPRAZOLE SODIUM 40 MG: 40 TABLET, DELAYED RELEASE ORAL at 05:31

## 2018-09-26 RX ADMIN — ROPINIROLE HYDROCHLORIDE 2 MG: 2 TABLET, FILM COATED ORAL at 08:27

## 2018-09-26 RX ADMIN — ANTACID TABLETS 250 MG: 500 TABLET, CHEWABLE ORAL at 08:27

## 2018-09-26 RX ADMIN — RASAGILINE 1 MG: 0.5 TABLET ORAL at 08:27

## 2018-09-26 RX ADMIN — ALBUTEROL SULFATE 2.5 MG: 2.5 SOLUTION RESPIRATORY (INHALATION) at 15:33

## 2018-09-26 RX ADMIN — LINAGLIPTIN 5 MG: 5 TABLET, FILM COATED ORAL at 08:26

## 2018-09-26 ASSESSMENT — PAIN DESCRIPTION - PROGRESSION
CLINICAL_PROGRESSION: GRADUALLY IMPROVING
CLINICAL_PROGRESSION: GRADUALLY WORSENING
CLINICAL_PROGRESSION: GRADUALLY IMPROVING
CLINICAL_PROGRESSION: GRADUALLY IMPROVING

## 2018-09-26 ASSESSMENT — ENCOUNTER SYMPTOMS
RESPIRATORY NEGATIVE: 1
GASTROINTESTINAL NEGATIVE: 1

## 2018-09-26 ASSESSMENT — PAIN DESCRIPTION - LOCATION
LOCATION: GENERALIZED
LOCATION: GENERALIZED

## 2018-09-26 ASSESSMENT — PAIN DESCRIPTION - PAIN TYPE
TYPE: CHRONIC PAIN
TYPE: CHRONIC PAIN

## 2018-09-26 ASSESSMENT — PAIN SCALES - GENERAL
PAINLEVEL_OUTOF10: 0
PAINLEVEL_OUTOF10: 0
PAINLEVEL_OUTOF10: 10
PAINLEVEL_OUTOF10: 6
PAINLEVEL_OUTOF10: 10

## 2018-09-26 ASSESSMENT — PAIN DESCRIPTION - DESCRIPTORS: DESCRIPTORS: ACHING

## 2018-09-26 ASSESSMENT — PAIN DESCRIPTION - FREQUENCY: FREQUENCY: INTERMITTENT

## 2018-09-26 NOTE — PROGRESS NOTES
gastrointestinal endoscopy (08/29/2018); and Upper gastrointestinal endoscopy (N/A, 8/29/2018). Restrictions  Restrictions/Precautions  Restrictions/Precautions: Fall Risk  Required Braces or Orthoses?: No  Position Activity Restriction  Other position/activity restrictions: fall risk;  contact isolation; O2 per nc; IV  Subjective   General  Chart Reviewed: Yes  Family / Caregiver Present: Yes  Subjective  Subjective: Patient is agreeable for PT treatment. General Comment  Comments: RNLucy report patient is appropriate for PT treatment. Pain Screening  Patient Currently in Pain: No  Pain Assessment  Pain Assessment: 0-10  Pain Level: 0  Vital Signs  Patient Currently in Pain: No  Oxygen Therapy  O2 Device: None (Room air)  O2 Flow Rate (L/min): 2 L/min       Objective      Transfers  Sit to Stand: Contact guard assistance  Stand to sit: Contact guard assistance  Bed to Chair: Contact guard assistance  Ambulation  Ambulation?: Yes  Ambulation 1  Surface: level tile  Device: Rolling Walker  Assistance: Contact guard assistance  Quality of Gait: Slow, step through gait   Distance: 20' x 1  Comments: Patient is able to slightly increase her gait tolerance. Patient unable manage her O2 line and not aware of safety with O2 line. Balance  Posture: Fair  Sitting - Static: Good  Sitting - Dynamic: Good  Standing - Static: Fair  Standing - Dynamic: Fair  Exercises  Comments: Seated arden LE AROM x 13 reps   Standing activity to challenge dynamic balance. Patient stood with RW and worked on walking around room and reaching of objects out of NANCY, such as cup on higher table and tooth brush/tooth paste from lower bed. Patient does fair as she does not bring RW in safe distance prior to reaching for item, 1 LOB that required Min x 1 A and patient unable to manage O2 lines safely. Assessment   Body structures, Functions, Activity limitations: Decreased functional mobility ; Decreased strength;Decreased

## 2018-09-26 NOTE — PROGRESS NOTES
Completed Specimen: Blood Updated: 09/26/18 0825    Specimen Description . BLOOD    Special Requests NOT REPORTED    Culture POSITIVE BLOOD CULTURE, RN NOTIFIED: Dorcas Tabitha IBARRA AT 1000 ON 9/24/18 (A)    Culture DIRECT GRAM STAIN FROM BOTTLE: GRAM POSITIVE COCCI IN CLUSTERS (A)    Culture PNAFISH negative. Culture results to follow. (A)    Culture MICROCOCCUS SPECIES A single positive blood culture of coagulase negative (A)    Culture  staphylococci, micrococci, diphtheroids or Bacillus species should be (A)    Culture  interpreted with caution and viewed as likely a skin contaminant. (A)    Status FINAL 09/26/2018   Culture blood #2 [[id number]] Collected: 09/22/18 2230   Order Status: Completed Specimen: Blood Updated: 09/26/18 0017    Specimen Description . BLOOD    Special Requests NOT REPORTED    Culture NO GROWTH 3 DAYS    Status Pending       Urine culture clean catch [[id number]] (Abnormal)  Collected: 09/22/18 2208   Order Status: Completed Specimen: Urine Updated: 09/25/18 0908    Specimen Description . URINE    Special Requests NOT REPORTED    Culture ESCHERICHIA COLI >[id number] CFU/ML THIS ORGANISM IS AN EXTENDED-SPECTRUM (A)    Culture  BETA-LACTAMASE  AND RESISTANCE TO THERAPY WITH PENICILLINS, (A)    Culture  CEPHALOSPORINS AND AZTREONAM IS EXPECTED.  THESE ORGANISMS GENERALLY REMAIN (A)    Culture  SUSCEPTIBLE TO CARBAPENEMS.  CONSIDER ID CONSULTATION.  (A)    Status FINAL 09/25/2018    Organism EC     ESCHERICHIA COLI     Antibiotic Interpretation ERIN Status   Confirmatory Extended Spectrum Beta-Lactamase Resistant POSITIVE Final   amikacin  NOT REPORTED Final   ampicillin Resistant >=32 RESISTANT Final   ampicillin-sulbactam  NOT REPORTED Final   aztreonam Resistant >=64 RESISTANT Final   ceFAZolin Resistant >=64 RESISTANT Final   cefTRIAXone Resistant >=64 RESISTANT Final   cefepime Resistant >=64 RESISTANT Final   ciprofloxacin Resistant >=4 RESISTANT Final   ertapenem  NOT REPORTED Final   gentamicin Sensitive <=1 SUSCEPTIBLE Final   meropenem Sensitive <=0.25 SUSCEPTIBLE Final   nitrofurantoin Sensitive <=16 SUSCEPTIBLE Final   piperacillin-tazobactam Resistant 64 RESISTANT Final   tigecycline  NOT REPORTED Final   tobramycin Sensitive <=1 SUSCEPTIBLE Final   trimethoprim-sulfamethoxazole Resistant >=320 RESISTANT Final     Flu A/B Ag Detection [[id number]] Collected: 09/23/18 0021   Order Status: Completed Specimen: Nasopharyngeal Updated: 09/23/18 0100    Specimen Description . NASOPHARYNGEAL SWAB    Special Requests NOT REPORTED    Direct Exam PRESUMPTIVE NEGATIVE for Influenza A + B antigens. Direct Exam PCR testing to confirm this result is available upon request. Stuart Thao will be    Direct Exam  saved in the laboratory for 7 days.  Please call [phone] if PCR testing is    Direct Exam  indicated. Status FINAL 09/23/2018       Medications:      albuterol  2.5 mg Nebulization TID    amiodarone  200 mg Oral BID    aspirin  81 mg Oral Daily    atorvastatin  20 mg Oral Daily    calcitRIOL  0.25 mcg Oral TID    carbidopa-levodopa  1 tablet Oral 4x Daily    conjugated estrogens  0.5 g Vaginal Every Other Day    ferrous sulfate  325 mg Oral BID WC    linagliptin  5 mg Oral Daily    magnesium oxide  400 mg Oral BID    metoprolol succinate  25 mg Oral Daily    pantoprazole  40 mg Oral BID AC    rasagiline  1 mg Oral Daily    rOPINIRole  2 mg Oral TID    senna  2 tablet Oral Daily    vitamin D  5,000 Units Oral Daily    insulin lispro  0-6 Units Subcutaneous TID WC    insulin lispro  0-3 Units Subcutaneous Nightly    calcium carbonate  250 mg Oral TID    tiotropium  18 mcg Inhalation Daily    meropenem  1 g Intravenous Q8H    fluticasone-vilanterol  1 puff Inhalation Daily    sodium chloride flush  10 mL Intravenous 2 times per day     Thank you for allowing us to participate in the care of this patient. Please call with questions.       [facility]  Traci Kaur

## 2018-09-27 VITALS
BODY MASS INDEX: 35.73 KG/M2 | RESPIRATION RATE: 16 BRPM | DIASTOLIC BLOOD PRESSURE: 50 MMHG | TEMPERATURE: 97.3 F | SYSTOLIC BLOOD PRESSURE: 133 MMHG | WEIGHT: 182 LBS | HEIGHT: 60 IN | OXYGEN SATURATION: 99 % | HEART RATE: 71 BPM

## 2018-09-27 LAB
-: NORMAL
ABSOLUTE EOS #: 0.2 K/UL (ref 0–0.4)
ABSOLUTE IMMATURE GRANULOCYTE: ABNORMAL K/UL (ref 0–0.3)
ABSOLUTE LYMPH #: 0.7 K/UL (ref 1–4.8)
ABSOLUTE MONO #: 0.8 K/UL (ref 0.2–0.8)
ANION GAP SERPL CALCULATED.3IONS-SCNC: 10 MMOL/L (ref 9–17)
BASOPHILS # BLD: 0 % (ref 0–2)
BASOPHILS ABSOLUTE: 0 K/UL (ref 0–0.2)
BUN BLDV-MCNC: 23 MG/DL (ref 8–23)
BUN/CREAT BLD: 13 (ref 9–20)
CALCIUM SERPL-MCNC: 8.5 MG/DL (ref 8.6–10.4)
CHLORIDE BLD-SCNC: 103 MMOL/L (ref 98–107)
CO2: 30 MMOL/L (ref 20–31)
CREAT SERPL-MCNC: 1.71 MG/DL (ref 0.5–0.9)
DIFFERENTIAL TYPE: ABNORMAL
EOSINOPHILS RELATIVE PERCENT: 4 % (ref 1–4)
GFR AFRICAN AMERICAN: 36 ML/MIN
GFR NON-AFRICAN AMERICAN: 29 ML/MIN
GFR SERPL CREATININE-BSD FRML MDRD: ABNORMAL ML/MIN/{1.73_M2}
GFR SERPL CREATININE-BSD FRML MDRD: ABNORMAL ML/MIN/{1.73_M2}
GLUCOSE BLD-MCNC: 141 MG/DL (ref 70–99)
GLUCOSE BLD-MCNC: 145 MG/DL (ref 65–105)
GLUCOSE BLD-MCNC: 152 MG/DL (ref 65–105)
HCT VFR BLD CALC: 25.6 % (ref 36–46)
HEMOGLOBIN: 8.5 G/DL (ref 12–16)
IMMATURE GRANULOCYTES: ABNORMAL %
LYMPHOCYTES # BLD: 15 % (ref 24–44)
MCH RBC QN AUTO: 33.1 PG (ref 26–34)
MCHC RBC AUTO-ENTMCNC: 33.2 G/DL (ref 31–37)
MCV RBC AUTO: 99.8 FL (ref 80–100)
MONOCYTES # BLD: 17 % (ref 1–7)
NRBC AUTOMATED: ABNORMAL PER 100 WBC
PDW BLD-RTO: 15.2 % (ref 11.5–14.5)
PLATELET # BLD: 96 K/UL (ref 130–400)
PLATELET ESTIMATE: ABNORMAL
PMV BLD AUTO: 7.9 FL (ref 6–12)
POTASSIUM SERPL-SCNC: 4.3 MMOL/L (ref 3.7–5.3)
PROCALCITONIN: 0.75 NG/ML
RBC # BLD: 2.57 M/UL (ref 4–5.2)
RBC # BLD: ABNORMAL 10*6/UL
REASON FOR REJECTION: NORMAL
SEG NEUTROPHILS: 64 % (ref 36–66)
SEGMENTED NEUTROPHILS ABSOLUTE COUNT: 2.8 K/UL (ref 1.8–7.7)
SODIUM BLD-SCNC: 143 MMOL/L (ref 135–144)
WBC # BLD: 4.4 K/UL (ref 3.5–11)
WBC # BLD: ABNORMAL 10*3/UL
ZZ NTE CLEAN UP: ORDERED TEST: NORMAL
ZZ NTE WITH NAME CLEAN UP: SPECIMEN SOURCE: NORMAL

## 2018-09-27 PROCEDURE — 6360000002 HC RX W HCPCS: Performed by: INTERNAL MEDICINE

## 2018-09-27 PROCEDURE — 2580000003 HC RX 258: Performed by: EMERGENCY MEDICINE

## 2018-09-27 PROCEDURE — 80048 BASIC METABOLIC PNL TOTAL CA: CPT

## 2018-09-27 PROCEDURE — 36415 COLL VENOUS BLD VENIPUNCTURE: CPT

## 2018-09-27 PROCEDURE — 97110 THERAPEUTIC EXERCISES: CPT

## 2018-09-27 PROCEDURE — 6370000000 HC RX 637 (ALT 250 FOR IP): Performed by: NURSE PRACTITIONER

## 2018-09-27 PROCEDURE — 85025 COMPLETE CBC W/AUTO DIFF WBC: CPT

## 2018-09-27 PROCEDURE — 99232 SBSQ HOSP IP/OBS MODERATE 35: CPT | Performed by: INTERNAL MEDICINE

## 2018-09-27 PROCEDURE — 6370000000 HC RX 637 (ALT 250 FOR IP): Performed by: FAMILY MEDICINE

## 2018-09-27 PROCEDURE — 6360000002 HC RX W HCPCS: Performed by: FAMILY MEDICINE

## 2018-09-27 PROCEDURE — 2700000000 HC OXYGEN THERAPY PER DAY

## 2018-09-27 PROCEDURE — 2580000003 HC RX 258: Performed by: FAMILY MEDICINE

## 2018-09-27 PROCEDURE — 97116 GAIT TRAINING THERAPY: CPT

## 2018-09-27 PROCEDURE — 6370000000 HC RX 637 (ALT 250 FOR IP): Performed by: INTERNAL MEDICINE

## 2018-09-27 PROCEDURE — 94640 AIRWAY INHALATION TREATMENT: CPT

## 2018-09-27 PROCEDURE — 99239 HOSP IP/OBS DSCHRG MGMT >30: CPT | Performed by: INTERNAL MEDICINE

## 2018-09-27 PROCEDURE — 94760 N-INVAS EAR/PLS OXIMETRY 1: CPT

## 2018-09-27 PROCEDURE — 82947 ASSAY GLUCOSE BLOOD QUANT: CPT

## 2018-09-27 RX ORDER — ALPRAZOLAM 0.25 MG/1
0.25 TABLET ORAL 3 TIMES DAILY PRN
Qty: 10 TABLET | Refills: 0 | Status: SHIPPED | OUTPATIENT
Start: 2018-09-27 | End: 2018-09-30

## 2018-09-27 RX ORDER — HYDROCODONE BITARTRATE AND ACETAMINOPHEN 5; 325 MG/1; MG/1
1 TABLET ORAL EVERY 6 HOURS PRN
Qty: 10 TABLET | Refills: 0 | Status: SHIPPED | OUTPATIENT
Start: 2018-09-27 | End: 2018-09-30

## 2018-09-27 RX ADMIN — CHOLECALCIFEROL CAP 125 MCG (5000 UNIT) 5000 UNITS: 125 CAP at 08:50

## 2018-09-27 RX ADMIN — ATORVASTATIN CALCIUM 20 MG: 20 TABLET, FILM COATED ORAL at 08:51

## 2018-09-27 RX ADMIN — CALCITRIOL 0.25 MCG: 0.25 CAPSULE ORAL at 08:50

## 2018-09-27 RX ADMIN — ANTACID TABLETS 250 MG: 500 TABLET, CHEWABLE ORAL at 08:51

## 2018-09-27 RX ADMIN — MAGNESIUM OXIDE TAB 400 MG (241.3 MG ELEMENTAL MG) 400 MG: 400 (241.3 MG) TAB at 08:51

## 2018-09-27 RX ADMIN — ROPINIROLE HYDROCHLORIDE 2 MG: 2 TABLET, FILM COATED ORAL at 08:51

## 2018-09-27 RX ADMIN — TIOTROPIUM BROMIDE 18 MCG: 18 CAPSULE ORAL; RESPIRATORY (INHALATION) at 09:30

## 2018-09-27 RX ADMIN — Medication 10 ML: at 08:55

## 2018-09-27 RX ADMIN — ALBUTEROL SULFATE 2.5 MG: 2.5 SOLUTION RESPIRATORY (INHALATION) at 05:54

## 2018-09-27 RX ADMIN — HYDROCODONE BITARTRATE AND ACETAMINOPHEN 1 TABLET: 5; 325 TABLET ORAL at 11:07

## 2018-09-27 RX ADMIN — INSULIN LISPRO 1 UNITS: 100 INJECTION, SOLUTION INTRAVENOUS; SUBCUTANEOUS at 08:50

## 2018-09-27 RX ADMIN — AMIODARONE HYDROCHLORIDE 200 MG: 200 TABLET ORAL at 08:51

## 2018-09-27 RX ADMIN — ALBUTEROL SULFATE 2.5 MG: 2.5 SOLUTION RESPIRATORY (INHALATION) at 14:06

## 2018-09-27 RX ADMIN — ASPIRIN 81 MG: 81 TABLET, COATED ORAL at 08:51

## 2018-09-27 RX ADMIN — ANTACID TABLETS 250 MG: 500 TABLET, CHEWABLE ORAL at 13:54

## 2018-09-27 RX ADMIN — MEROPENEM 1 G: 1 INJECTION, POWDER, FOR SOLUTION INTRAVENOUS at 11:02

## 2018-09-27 RX ADMIN — METOPROLOL SUCCINATE 25 MG: 25 TABLET, FILM COATED, EXTENDED RELEASE ORAL at 08:50

## 2018-09-27 RX ADMIN — CALCITRIOL 0.25 MCG: 0.25 CAPSULE ORAL at 13:53

## 2018-09-27 RX ADMIN — CARBIDOPA AND LEVODOPA 1 TABLET: 25; 100 TABLET, EXTENDED RELEASE ORAL at 08:51

## 2018-09-27 RX ADMIN — ROPINIROLE HYDROCHLORIDE 2 MG: 2 TABLET, FILM COATED ORAL at 13:54

## 2018-09-27 RX ADMIN — LINAGLIPTIN 5 MG: 5 TABLET, FILM COATED ORAL at 08:51

## 2018-09-27 RX ADMIN — CARBIDOPA AND LEVODOPA 1 TABLET: 25; 100 TABLET, EXTENDED RELEASE ORAL at 13:54

## 2018-09-27 RX ADMIN — FERROUS SULFATE TAB EC 325 MG (65 MG FE EQUIVALENT) 325 MG: 325 (65 FE) TABLET DELAYED RESPONSE at 08:51

## 2018-09-27 RX ADMIN — MEROPENEM 1 G: 1 INJECTION, POWDER, FOR SOLUTION INTRAVENOUS at 01:39

## 2018-09-27 RX ADMIN — RASAGILINE 1 MG: 0.5 TABLET ORAL at 08:51

## 2018-09-27 RX ADMIN — ALBUTEROL SULFATE 2.5 MG: 2.5 SOLUTION RESPIRATORY (INHALATION) at 09:30

## 2018-09-27 RX ADMIN — PANTOPRAZOLE SODIUM 40 MG: 40 TABLET, DELAYED RELEASE ORAL at 05:33

## 2018-09-27 ASSESSMENT — PAIN DESCRIPTION - PROGRESSION
CLINICAL_PROGRESSION: GRADUALLY IMPROVING

## 2018-09-27 ASSESSMENT — ENCOUNTER SYMPTOMS
RESPIRATORY NEGATIVE: 1
GASTROINTESTINAL NEGATIVE: 1

## 2018-09-27 ASSESSMENT — PAIN SCALES - GENERAL
PAINLEVEL_OUTOF10: 10
PAINLEVEL_OUTOF10: 6

## 2018-09-27 ASSESSMENT — PAIN DESCRIPTION - PAIN TYPE: TYPE: CHRONIC PAIN

## 2018-09-27 ASSESSMENT — PAIN DESCRIPTION - LOCATION: LOCATION: GENERALIZED

## 2018-09-27 NOTE — CARE COORDINATION
LUIS informed by [facility] RN transport has been arranged for 1:30pm going to [address] [place] [address]. LUIS informed the facility and will fax discharge paperwork. LUIS informed pt spouse Dulce Bhat regarding discharge.      RN to call report [phone]

## 2018-09-27 NOTE — DISCHARGE SUMMARY
SPECGRAV 1.020 09/22/2018    HGBUR 3+ 09/22/2018    PHUR 5.5 09/22/2018    PROTEINU 1+ 09/22/2018    GLUCOSEU NEGATIVE 09/22/2018    KETUA NEGATIVE 09/22/2018    BILIRUBINUR NEGATIVE 09/22/2018    UROBILINOGEN Normal 09/22/2018    NITRU POSITIVE 09/22/2018    LEUKOCYTESUR MOD 09/22/2018     TSH:    Lab Results   Component Value Date    TSH 2.58 08/25/2018 9/25/2018  9:08 AM - Lucía Valentin Incoming Lab Results From [facility] [place]     Component Results     Component Collected Lab   Specimen Description 09/22/2018 10:08  [facility]   . URINE    Special Requests 09/22/2018 10:08  [facility]   NOT REPORTED    Culture  (Abnormal) 09/22/2018 10:08 PM [address] >[id number] CFU/ML THIS ORGANISM IS AN EXTENDED-SPECTRUM     Culture  (Abnormal) 09/22/2018 10:08  [address]    BETA-LACTAMASE  AND RESISTANCE TO THERAPY WITH PENICILLINS,     Culture  (Abnormal) 09/22/2018 10:08 PM [address] AZTREONAM IS EXPECTED.  THESE ORGANISMS GENERALLY REMAIN     Culture  (Abnormal) 09/22/2018 10:08 PM [address] TO CARBAPENEMS.  CONSIDER ID CONSULTATION.      Status 09/22/2018 10:08  [address]   FINAL 09/25/2018    Organism 09/22/2018 10:08  [address]   EC    Testing Performed By     [employer] - Abbreviation Name Director Address Valid Date Range   [facility]  [address] [address] 08/30/17 1201-Present   [facility]- [address] LAB Raquel Noel  [facility] [address] 08/30/17 1157-Present   Culture & Susceptibility     ESCHERICHIA COLI     Antibiotic Interpretation ERIN Status   Confirmatory Extended Spectrum Beta-Lactamase Resistant POSITIVE Final   amikacin  NOT REPORTED Final   ampicillin Resistant >=32 RESISTANT Final

## 2018-09-27 NOTE — PROGRESS NOTES
caregivers in the room with her. Surprisingly today she reports that she actually is feeling better. She does not report any fever or chills. Review of Systems   Constitutional: Negative for chills, fever and malaise/fatigue. Respiratory: Negative. Cardiovascular: Negative. Gastrointestinal: Negative. Genitourinary: Positive for frequency. Physical Examination :     Physical Exam   Constitutional: She is oriented to person, place, and time. HENT:   Head: Normocephalic and atraumatic. Cardiovascular: Regular rhythm and normal heart sounds. Pulmonary/Chest: Effort normal and breath sounds normal.   Abdominal: Soft. Bowel sounds are normal.   Obese protuberant abdomen   Neurological: She is alert and oriented to person, place, and time. Skin:   The some mild scattered patchy erythematous areas mostly on the right lower extremity. Laboratory data:   I have independently reviewed the following labs:  CBC with Differential:   Recent Labs      09/26/18   0524  09/27/18   0802   WBC  3.4*  4.4   HGB  8.3*  8.5*   HCT  25.4*  25.6*   PLT  92*  96*   LYMPHOPCT  13*  15*   MONOPCT  20*  17*     BMP:   Recent Labs      09/26/18   0524  09/27/18   0802   NA  138  143   K  4.4  4.3   CL  100  103   CO2  28  30   BUN  25*  23   CREATININE  2.07*  1.71*     Hepatic Function Panel:   No results for input(s): PROT, LABALBU, BILIDIR, IBILI, BILITOT, ALKPHOS, ALT, AST in the last 72 hours. No results for input(s): VANCOTROUGH in the last 72 hours. Lab Results   Component Value Date    CRP 3.1 06/12/2013     Lab Results   Component Value Date    SEDRATE 79 (H) 08/24/2018       Imaging Studies:   No new imaging    Cultures:     Culture blood #2 [[id number]] Collected: 09/22/18 2230   Order Status: Completed Specimen: Blood Updated: 09/27/18 0004    Specimen Description . BLOOD    Special Requests NOT REPORTED    Culture NO GROWTH 4 DAYS    Status Pending      Culture blood #1 [[id number]] (Abnormal) created with the assistance of a speech recognition program.  While intending to generate a document that actually reflects the content of the visit, the document can still have some errors including those of syntax and sound a like substitutions which may escape proof reading. It such instances, actual meaning can be extrapolated by contextual diversion.

## 2018-09-27 NOTE — PROGRESS NOTES
Allergies: Allergies   Allergen Reactions    Dye [Barium-Containing Compounds] Other (See Comments)     Cause Afib per     Pcn [Penicillins] Itching and Swelling    Bactrim [Sulfamethoxazole-Trimethoprim] Other (See Comments)     Allergic Nephritis    Red Dye Itching and Rash     This allergy is likely incorrect:  Per patient's  she has no issue with medications that have red dye in them or red food. He thinks this reaction was added to chart when the pt had a colonoscopy (possibly barium or iodine dye instead)       Current Meds:   Scheduled Meds:    albuterol  2.5 mg Nebulization TID    amiodarone  200 mg Oral BID    aspirin  81 mg Oral Daily    atorvastatin  20 mg Oral Daily    calcitRIOL  0.25 mcg Oral TID    carbidopa-levodopa  1 tablet Oral 4x Daily    conjugated estrogens  0.5 g Vaginal Every Other Day    ferrous sulfate  325 mg Oral BID WC    linagliptin  5 mg Oral Daily    magnesium oxide  400 mg Oral BID    metoprolol succinate  25 mg Oral Daily    pantoprazole  40 mg Oral BID AC    rasagiline  1 mg Oral Daily    rOPINIRole  2 mg Oral TID    senna  2 tablet Oral Daily    vitamin D  5,000 Units Oral Daily    insulin lispro  0-6 Units Subcutaneous TID WC    insulin lispro  0-3 Units Subcutaneous Nightly    calcium carbonate  250 mg Oral TID    tiotropium  18 mcg Inhalation Daily    meropenem  1 g Intravenous Q8H    fluticasone-vilanterol  1 puff Inhalation Daily    sodium chloride flush  10 mL Intravenous 2 times per day     Continuous Infusions:    dextrose       PRN Meds: acetaminophen, HYDROcodone-acetaminophen, ALPRAZolam, melatonin, polyethylene glycol, magnesium hydroxide, bisacodyl, nicotine, glucose, dextrose, glucagon (rDNA), dextrose, ondansetron **OR** ondansetron, albuterol, sodium chloride flush    Data:     Past Medical History:   has a past medical history of Acute on chronic diastolic congestive heart failure ([facility] [address]  Anesthesia complication;

## 2018-09-27 NOTE — PLAN OF CARE
Problem: Falls - Risk of:  Goal: Will remain free from falls  Will remain free from falls   Outcome: Ongoing  Pt fall risk, fall band present, falling star, safety alarm activated and in use as needed. Hourly rounding performed. Pt encouraged to use call light. See Ramón Phillips fall risk assessment. Goal: Absence of physical injury  Absence of physical injury   Outcome: Ongoing  Non-skid socks in place, up with assistance, bed in lowest position, bed exit & alarm as needed, provide toileting every 2 hours an d as needed. Problem: Risk for Impaired Skin Integrity  Goal: Tissue integrity - skin and mucous membranes  Structural intactness and normal physiological function of skin and  mucous membranes. Outcome: Ongoing  Continuing to monitor for skin integrity risks. Patient independent with turning/repositioning. Turning/repositioning encouraged at least once every 2 hrs, and prn basis. Hygiene care being completed independently per patient; assistance provided when deemed necessary. Problem: IP BALANCE  Goal: BALANCE EDUCATION  Educate patients on maintaining dynamic/static standing/sitting balance, with/without upper extremity support. Outcome: Ongoing  Work with physical therapy. Assist with range of motion & toileting as needed. Problem: Musculor/Skeletal Functional Status  Goal: Highest potential functional level  Outcome: Ongoing  Assessed musculoskeletal functional level. Assessed ROM & ability to care for self. Problem: Pain:  Goal: Pain level will decrease  Pain level will decrease   Outcome: Ongoing  Monitoring pain with each assessment and prn. LAVON 0-10 pain scale utilized. Non-pharmacological measures to be encouraged prior to pharmacological measures.    Goal: Control of acute pain  Control of acute pain   Outcome: Ongoing    Goal: Control of chronic pain  Control of chronic pain   Outcome: Ongoing

## 2018-09-29 LAB
CULTURE: NORMAL
Lab: NORMAL
SPECIMEN DESCRIPTION: NORMAL
STATUS: NORMAL

## 2018-10-05 ENCOUNTER — TELEPHONE (OUTPATIENT)
Dept: FAMILY MEDICINE CLINIC | Age: 73
End: 2018-10-05

## 2018-10-06 ENCOUNTER — APPOINTMENT (OUTPATIENT)
Dept: GENERAL RADIOLOGY | Age: 73
End: 2018-10-06
Payer: COMMERCIAL

## 2018-10-06 ENCOUNTER — APPOINTMENT (OUTPATIENT)
Dept: CT IMAGING | Age: 73
End: 2018-10-06
Payer: COMMERCIAL

## 2018-10-06 ENCOUNTER — HOSPITAL ENCOUNTER (OUTPATIENT)
Age: 73
Setting detail: OBSERVATION
Discharge: HOME HEALTH | End: 2018-10-07
Admitting: INTERNAL MEDICINE
Payer: COMMERCIAL

## 2018-10-06 DIAGNOSIS — R40.0 SOMNOLENCE: ICD-10-CM

## 2018-10-06 DIAGNOSIS — R06.02 SHORTNESS OF BREATH: Primary | ICD-10-CM

## 2018-10-06 DIAGNOSIS — R50.81 FEVER IN OTHER DISEASES: ICD-10-CM

## 2018-10-06 DIAGNOSIS — Z86.19 HISTORY OF E. COLI SEPTICEMIA: ICD-10-CM

## 2018-10-06 PROBLEM — J96.01 ACUTE HYPOXEMIC RESPIRATORY FAILURE (HCC): Status: RESOLVED | Noted: 2018-08-23 | Resolved: 2018-10-06

## 2018-10-06 PROBLEM — R57.1 HYPOVOLEMIC SHOCK (HCC): Status: RESOLVED | Noted: 2018-08-24 | Resolved: 2018-10-06

## 2018-10-06 PROBLEM — D69.6 THROMBOCYTOPENIA (HCC): Status: RESOLVED | Noted: 2018-08-25 | Resolved: 2018-10-06

## 2018-10-06 PROBLEM — N18.9 ACUTE KIDNEY INJURY SUPERIMPOSED ON CHRONIC KIDNEY DISEASE (HCC): Status: RESOLVED | Noted: 2018-08-20 | Resolved: 2018-10-06

## 2018-10-06 PROBLEM — N17.9 ACUTE KIDNEY INJURY SUPERIMPOSED ON CHRONIC KIDNEY DISEASE (HCC): Status: RESOLVED | Noted: 2018-08-20 | Resolved: 2018-10-06

## 2018-10-06 PROBLEM — R50.9 FEVER: Status: ACTIVE | Noted: 2018-10-06

## 2018-10-06 PROBLEM — J18.9 HCAP (HEALTHCARE-ASSOCIATED PNEUMONIA): Status: RESOLVED | Noted: 2017-01-15 | Resolved: 2018-10-06

## 2018-10-06 LAB
-: NORMAL
ABSOLUTE EOS #: 1.2 K/UL (ref 0–0.4)
ABSOLUTE IMMATURE GRANULOCYTE: ABNORMAL K/UL (ref 0–0.3)
ABSOLUTE LYMPH #: 1.4 K/UL (ref 1–4.8)
ABSOLUTE MONO #: 0.7 K/UL (ref 0.2–0.8)
ALBUMIN SERPL-MCNC: 4 G/DL (ref 3.5–5.2)
ALBUMIN/GLOBULIN RATIO: ABNORMAL (ref 1–2.5)
ALP BLD-CCNC: 69 U/L (ref 35–104)
ALT SERPL-CCNC: 12 U/L (ref 5–33)
AMORPHOUS: NORMAL
AMYLASE: 42 U/L (ref 28–100)
ANION GAP SERPL CALCULATED.3IONS-SCNC: 13 MMOL/L
AST SERPL-CCNC: 24 U/L
BACTERIA: NORMAL
BASOPHILS # BLD: 1 % (ref 0–2)
BASOPHILS ABSOLUTE: 0.1 K/UL (ref 0–0.2)
BILIRUB SERPL-MCNC: 0.2 MG/DL (ref 0.3–1.2)
BILIRUBIN URINE: NEGATIVE
BNP INTERPRETATION: ABNORMAL
BUN BLDV-MCNC: 22 MG/DL (ref 8–23)
BUN/CREAT BLD: 13 (ref 9–20)
CALCIUM SERPL-MCNC: 9.9 MG/DL (ref 8.6–10.4)
CASTS UA: NORMAL /LPF
CHLORIDE BLD-SCNC: 98 MMOL/L (ref 98–107)
CO2: 34 MMOL/L (ref 20–31)
COLOR: YELLOW
COMMENT UA: ABNORMAL
CREAT SERPL-MCNC: 1.7 MG/DL (ref 0.5–0.9)
CRYSTALS, UA: NORMAL /HPF
DIFFERENTIAL TYPE: ABNORMAL
EOSINOPHILS RELATIVE PERCENT: 11 % (ref 1–4)
EPITHELIAL CELLS UA: NORMAL /HPF (ref 0–5)
FIO2: ABNORMAL
GFR AFRICAN AMERICAN: 36 ML/MIN
GFR NON-AFRICAN AMERICAN: 30 ML/MIN
GFR SERPL CREATININE-BSD FRML MDRD: ABNORMAL ML/MIN/{1.73_M2}
GFR SERPL CREATININE-BSD FRML MDRD: ABNORMAL ML/MIN/{1.73_M2}
GLUCOSE BLD-MCNC: 109 MG/DL (ref 65–105)
GLUCOSE BLD-MCNC: 122 MG/DL (ref 70–99)
GLUCOSE BLD-MCNC: 124 MG/DL (ref 65–105)
GLUCOSE URINE: NEGATIVE
HCO3 VENOUS: 37.1 MMOL/L (ref 24–30)
HCT VFR BLD CALC: 29 % (ref 36–46)
HEMOGLOBIN: 9.6 G/DL (ref 12–16)
IMMATURE GRANULOCYTES: ABNORMAL %
KETONES, URINE: NEGATIVE
LACTIC ACID, SEPSIS WHOLE BLOOD: NORMAL MMOL/L (ref 0.5–1.9)
LACTIC ACID, SEPSIS WHOLE BLOOD: NORMAL MMOL/L (ref 0.5–1.9)
LACTIC ACID, SEPSIS: 1.1 MMOL/L (ref 0.5–1.9)
LACTIC ACID, SEPSIS: 1.2 MMOL/L (ref 0.5–1.9)
LEUKOCYTE ESTERASE, URINE: ABNORMAL
LIPASE: 52 U/L (ref 13–60)
LYMPHOCYTES # BLD: 13 % (ref 24–44)
MCH RBC QN AUTO: 33.5 PG (ref 26–34)
MCHC RBC AUTO-ENTMCNC: 33.3 G/DL (ref 31–37)
MCV RBC AUTO: 100.6 FL (ref 80–100)
MONOCYTES # BLD: 6 % (ref 1–7)
MUCUS: NORMAL
NEGATIVE BASE EXCESS, VEN: ABNORMAL (ref 0–2)
NITRITE, URINE: NEGATIVE
NRBC AUTOMATED: ABNORMAL PER 100 WBC
O2 DEVICE/FLOW/%: ABNORMAL
O2 SAT, VEN: 82 %
OTHER OBSERVATIONS UA: NORMAL
PATIENT TEMP: ABNORMAL
PCO2, VEN: 60 MM HG (ref 39–55)
PDW BLD-RTO: 16 % (ref 11.5–14.5)
PH UA: 7 (ref 5–8)
PH VENOUS: 7.4 (ref 7.32–7.42)
PLATELET # BLD: 209 K/UL (ref 130–400)
PLATELET ESTIMATE: ABNORMAL
PMV BLD AUTO: 7.8 FL (ref 6–12)
PO2, VEN: 48 MM HG (ref 30–50)
POC PCO2 TEMP: ABNORMAL MM HG
POC PH TEMP: ABNORMAL
POC PO2 TEMP: ABNORMAL MM HG
POSITIVE BASE EXCESS, VEN: 12 (ref 0–2)
POTASSIUM SERPL-SCNC: 4.3 MMOL/L (ref 3.7–5.3)
PRO-BNP: 819 PG/ML
PROTEIN UA: NEGATIVE
RBC # BLD: 2.88 M/UL (ref 4–5.2)
RBC # BLD: ABNORMAL 10*6/UL
RBC UA: NORMAL /HPF (ref 0–2)
RENAL EPITHELIAL, UA: NORMAL /HPF
SEG NEUTROPHILS: 69 % (ref 36–66)
SEGMENTED NEUTROPHILS ABSOLUTE COUNT: 7.4 K/UL (ref 1.8–7.7)
SODIUM BLD-SCNC: 145 MMOL/L (ref 135–144)
SPECIFIC GRAVITY UA: 1.02 (ref 1–1.03)
TOTAL CK: 45 U/L (ref 26–192)
TOTAL CO2, VENOUS: 39 MMOL/L (ref 25–31)
TOTAL PROTEIN: 6.7 G/DL (ref 6.4–8.3)
TRICHOMONAS: NORMAL
TROPONIN INTERP: NORMAL
TROPONIN T: <0.03 NG/ML
TURBIDITY: CLEAR
URINE HGB: ABNORMAL
UROBILINOGEN, URINE: NORMAL
WBC # BLD: 10.7 K/UL (ref 3.5–11)
WBC # BLD: ABNORMAL 10*3/UL
WBC UA: NORMAL /HPF (ref 0–5)
YEAST: NORMAL

## 2018-10-06 PROCEDURE — 99219 PR INITIAL OBSERVATION CARE/DAY 50 MINUTES: CPT | Performed by: INTERNAL MEDICINE

## 2018-10-06 PROCEDURE — 74176 CT ABD & PELVIS W/O CONTRAST: CPT

## 2018-10-06 PROCEDURE — 87040 BLOOD CULTURE FOR BACTERIA: CPT

## 2018-10-06 PROCEDURE — 83690 ASSAY OF LIPASE: CPT

## 2018-10-06 PROCEDURE — G0378 HOSPITAL OBSERVATION PER HR: HCPCS

## 2018-10-06 PROCEDURE — 6360000002 HC RX W HCPCS

## 2018-10-06 PROCEDURE — 71045 X-RAY EXAM CHEST 1 VIEW: CPT

## 2018-10-06 PROCEDURE — 36415 COLL VENOUS BLD VENIPUNCTURE: CPT

## 2018-10-06 PROCEDURE — 81001 URINALYSIS AUTO W/SCOPE: CPT

## 2018-10-06 PROCEDURE — 83880 ASSAY OF NATRIURETIC PEPTIDE: CPT

## 2018-10-06 PROCEDURE — 82150 ASSAY OF AMYLASE: CPT

## 2018-10-06 PROCEDURE — 2580000003 HC RX 258

## 2018-10-06 PROCEDURE — 96372 THER/PROPH/DIAG INJ SC/IM: CPT

## 2018-10-06 PROCEDURE — 82805 BLOOD GASES W/O2 SATURATION: CPT

## 2018-10-06 PROCEDURE — 96365 THER/PROPH/DIAG IV INF INIT: CPT

## 2018-10-06 PROCEDURE — 82550 ASSAY OF CK (CPK): CPT

## 2018-10-06 PROCEDURE — 82947 ASSAY GLUCOSE BLOOD QUANT: CPT

## 2018-10-06 PROCEDURE — 85025 COMPLETE CBC W/AUTO DIFF WBC: CPT

## 2018-10-06 PROCEDURE — 6360000002 HC RX W HCPCS: Performed by: INTERNAL MEDICINE

## 2018-10-06 PROCEDURE — 6370000000 HC RX 637 (ALT 250 FOR IP): Performed by: NURSE PRACTITIONER

## 2018-10-06 PROCEDURE — 84484 ASSAY OF TROPONIN QUANT: CPT

## 2018-10-06 PROCEDURE — 99285 EMERGENCY DEPT VISIT HI MDM: CPT

## 2018-10-06 PROCEDURE — 6370000000 HC RX 637 (ALT 250 FOR IP): Performed by: INTERNAL MEDICINE

## 2018-10-06 PROCEDURE — 87086 URINE CULTURE/COLONY COUNT: CPT

## 2018-10-06 PROCEDURE — 82803 BLOOD GASES ANY COMBINATION: CPT

## 2018-10-06 PROCEDURE — 93005 ELECTROCARDIOGRAM TRACING: CPT

## 2018-10-06 PROCEDURE — 83605 ASSAY OF LACTIC ACID: CPT

## 2018-10-06 PROCEDURE — 80053 COMPREHEN METABOLIC PANEL: CPT

## 2018-10-06 RX ORDER — IPRATROPIUM BROMIDE AND ALBUTEROL SULFATE 2.5; .5 MG/3ML; MG/3ML
3 SOLUTION RESPIRATORY (INHALATION) 3 TIMES DAILY
Status: DISCONTINUED | OUTPATIENT
Start: 2018-10-06 | End: 2018-10-07 | Stop reason: HOSPADM

## 2018-10-06 RX ORDER — CALCITRIOL 0.25 UG/1
0.25 CAPSULE, LIQUID FILLED ORAL 2 TIMES DAILY
Status: DISCONTINUED | OUTPATIENT
Start: 2018-10-06 | End: 2018-10-06

## 2018-10-06 RX ORDER — POLYETHYLENE GLYCOL 3350 17 G/17G
17 POWDER, FOR SOLUTION ORAL DAILY PRN
Status: DISCONTINUED | OUTPATIENT
Start: 2018-10-06 | End: 2018-10-07 | Stop reason: HOSPADM

## 2018-10-06 RX ORDER — RASAGILINE 1 MG/1
1 TABLET ORAL DAILY
Status: DISCONTINUED | OUTPATIENT
Start: 2018-10-06 | End: 2018-10-07 | Stop reason: HOSPADM

## 2018-10-06 RX ORDER — HYDROCODONE BITARTRATE AND ACETAMINOPHEN 5; 325 MG/1; MG/1
1 TABLET ORAL 2 TIMES DAILY PRN
COMMUNITY
End: 2018-10-10

## 2018-10-06 RX ORDER — SODIUM CHLORIDE 0.9 % (FLUSH) 0.9 %
10 SYRINGE (ML) INJECTION EVERY 12 HOURS SCHEDULED
Status: DISCONTINUED | OUTPATIENT
Start: 2018-10-06 | End: 2018-10-06 | Stop reason: SDUPTHER

## 2018-10-06 RX ORDER — SODIUM CHLORIDE 0.9 % (FLUSH) 0.9 %
10 SYRINGE (ML) INJECTION PRN
Status: DISCONTINUED | OUTPATIENT
Start: 2018-10-06 | End: 2018-10-06 | Stop reason: SDUPTHER

## 2018-10-06 RX ORDER — SPIRONOLACTONE 25 MG/1
50 TABLET ORAL DAILY
Status: DISCONTINUED | OUTPATIENT
Start: 2018-10-06 | End: 2018-10-07 | Stop reason: HOSPADM

## 2018-10-06 RX ORDER — HEPARIN SODIUM 5000 [USP'U]/ML
5000 INJECTION, SOLUTION INTRAVENOUS; SUBCUTANEOUS EVERY 8 HOURS SCHEDULED
Status: DISCONTINUED | OUTPATIENT
Start: 2018-10-06 | End: 2018-10-07 | Stop reason: HOSPADM

## 2018-10-06 RX ORDER — ACETAMINOPHEN 325 MG/1
650 TABLET ORAL EVERY 4 HOURS PRN
Status: DISCONTINUED | OUTPATIENT
Start: 2018-10-06 | End: 2018-10-07 | Stop reason: HOSPADM

## 2018-10-06 RX ORDER — DEXTROSE MONOHYDRATE 50 MG/ML
100 INJECTION, SOLUTION INTRAVENOUS PRN
Status: DISCONTINUED | OUTPATIENT
Start: 2018-10-06 | End: 2018-10-07 | Stop reason: HOSPADM

## 2018-10-06 RX ORDER — FUROSEMIDE 20 MG/1
40 TABLET ORAL 2 TIMES DAILY
Status: DISCONTINUED | OUTPATIENT
Start: 2018-10-06 | End: 2018-10-07

## 2018-10-06 RX ORDER — LANOLIN ALCOHOL/MO/W.PET/CERES
325 CREAM (GRAM) TOPICAL 2 TIMES DAILY WITH MEALS
Status: DISCONTINUED | OUTPATIENT
Start: 2018-10-06 | End: 2018-10-06

## 2018-10-06 RX ORDER — AMIODARONE HYDROCHLORIDE 200 MG/1
200 TABLET ORAL DAILY
COMMUNITY

## 2018-10-06 RX ORDER — POTASSIUM CHLORIDE 7.45 MG/ML
10 INJECTION INTRAVENOUS PRN
Status: DISCONTINUED | OUTPATIENT
Start: 2018-10-06 | End: 2018-10-07 | Stop reason: HOSPADM

## 2018-10-06 RX ORDER — AMIODARONE HYDROCHLORIDE 200 MG/1
200 TABLET ORAL DAILY
Status: DISCONTINUED | OUTPATIENT
Start: 2018-10-06 | End: 2018-10-07 | Stop reason: HOSPADM

## 2018-10-06 RX ORDER — ONDANSETRON 4 MG/1
4 TABLET, ORALLY DISINTEGRATING ORAL EVERY 6 HOURS PRN
Status: DISCONTINUED | OUTPATIENT
Start: 2018-10-06 | End: 2018-10-07 | Stop reason: HOSPADM

## 2018-10-06 RX ORDER — MAGNESIUM SULFATE 1 G/100ML
1 INJECTION INTRAVENOUS PRN
Status: DISCONTINUED | OUTPATIENT
Start: 2018-10-06 | End: 2018-10-07 | Stop reason: HOSPADM

## 2018-10-06 RX ORDER — HYDROCODONE BITARTRATE AND ACETAMINOPHEN 5; 325 MG/1; MG/1
1 TABLET ORAL EVERY 12 HOURS PRN
Status: DISCONTINUED | OUTPATIENT
Start: 2018-10-06 | End: 2018-10-07 | Stop reason: HOSPADM

## 2018-10-06 RX ORDER — METOPROLOL SUCCINATE 25 MG/1
25 TABLET, EXTENDED RELEASE ORAL DAILY
Status: DISCONTINUED | OUTPATIENT
Start: 2018-10-06 | End: 2018-10-06

## 2018-10-06 RX ORDER — BISACODYL 10 MG
10 SUPPOSITORY, RECTAL RECTAL DAILY PRN
Status: DISCONTINUED | OUTPATIENT
Start: 2018-10-06 | End: 2018-10-07 | Stop reason: HOSPADM

## 2018-10-06 RX ORDER — PANTOPRAZOLE SODIUM 40 MG/1
40 TABLET, DELAYED RELEASE ORAL
Status: DISCONTINUED | OUTPATIENT
Start: 2018-10-06 | End: 2018-10-07 | Stop reason: HOSPADM

## 2018-10-06 RX ORDER — CARBIDOPA AND LEVODOPA 25; 100 MG/1; MG/1
1 TABLET, EXTENDED RELEASE ORAL 4 TIMES DAILY
Status: DISCONTINUED | OUTPATIENT
Start: 2018-10-06 | End: 2018-10-07 | Stop reason: HOSPADM

## 2018-10-06 RX ORDER — ALPRAZOLAM 0.25 MG/1
0.25 TABLET ORAL 3 TIMES DAILY PRN
COMMUNITY
End: 2019-02-05 | Stop reason: ALTCHOICE

## 2018-10-06 RX ORDER — ALPRAZOLAM 0.25 MG/1
0.25 TABLET ORAL 3 TIMES DAILY PRN
Status: DISCONTINUED | OUTPATIENT
Start: 2018-10-06 | End: 2018-10-07 | Stop reason: HOSPADM

## 2018-10-06 RX ORDER — LANOLIN ALCOHOL/MO/W.PET/CERES
3 CREAM (GRAM) TOPICAL NIGHTLY PRN
Status: DISCONTINUED | OUTPATIENT
Start: 2018-10-06 | End: 2018-10-06

## 2018-10-06 RX ORDER — SODIUM CHLORIDE 0.9 % (FLUSH) 0.9 %
10 SYRINGE (ML) INJECTION EVERY 12 HOURS SCHEDULED
Status: DISCONTINUED | OUTPATIENT
Start: 2018-10-06 | End: 2018-10-07 | Stop reason: HOSPADM

## 2018-10-06 RX ORDER — POTASSIUM CHLORIDE 20 MEQ/1
40 TABLET, EXTENDED RELEASE ORAL PRN
Status: DISCONTINUED | OUTPATIENT
Start: 2018-10-06 | End: 2018-10-07 | Stop reason: HOSPADM

## 2018-10-06 RX ORDER — ATORVASTATIN CALCIUM 20 MG/1
20 TABLET, FILM COATED ORAL DAILY
Status: DISCONTINUED | OUTPATIENT
Start: 2018-10-06 | End: 2018-10-07 | Stop reason: HOSPADM

## 2018-10-06 RX ORDER — BUTALBITAL, ACETAMINOPHEN AND CAFFEINE 50; 325; 40 MG/1; MG/1; MG/1
1 TABLET ORAL EVERY 4 HOURS PRN
Status: DISCONTINUED | OUTPATIENT
Start: 2018-10-06 | End: 2018-10-06

## 2018-10-06 RX ORDER — SPIRONOLACTONE 50 MG/1
50 TABLET, FILM COATED ORAL DAILY
COMMUNITY
End: 2018-11-27

## 2018-10-06 RX ORDER — ONDANSETRON 2 MG/ML
4 INJECTION INTRAMUSCULAR; INTRAVENOUS EVERY 6 HOURS PRN
Status: DISCONTINUED | OUTPATIENT
Start: 2018-10-06 | End: 2018-10-07 | Stop reason: HOSPADM

## 2018-10-06 RX ORDER — NICOTINE POLACRILEX 4 MG
15 LOZENGE BUCCAL PRN
Status: DISCONTINUED | OUTPATIENT
Start: 2018-10-06 | End: 2018-10-07 | Stop reason: HOSPADM

## 2018-10-06 RX ORDER — NICOTINE 21 MG/24HR
1 PATCH, TRANSDERMAL 24 HOURS TRANSDERMAL DAILY PRN
Status: DISCONTINUED | OUTPATIENT
Start: 2018-10-06 | End: 2018-10-07 | Stop reason: HOSPADM

## 2018-10-06 RX ORDER — SODIUM CHLORIDE 9 MG/ML
INJECTION, SOLUTION INTRAVENOUS CONTINUOUS
Status: DISCONTINUED | OUTPATIENT
Start: 2018-10-06 | End: 2018-10-06

## 2018-10-06 RX ORDER — ONDANSETRON 2 MG/ML
4 INJECTION INTRAMUSCULAR; INTRAVENOUS EVERY 6 HOURS PRN
Status: DISCONTINUED | OUTPATIENT
Start: 2018-10-06 | End: 2018-10-06

## 2018-10-06 RX ORDER — FUROSEMIDE 20 MG/1
40 TABLET ORAL DAILY
Status: ON HOLD | COMMUNITY
End: 2018-12-05 | Stop reason: HOSPADM

## 2018-10-06 RX ORDER — ROPINIROLE 1 MG/1
2 TABLET, FILM COATED ORAL 3 TIMES DAILY
Status: DISCONTINUED | OUTPATIENT
Start: 2018-10-06 | End: 2018-10-07 | Stop reason: HOSPADM

## 2018-10-06 RX ORDER — SENNA PLUS 8.6 MG/1
2 TABLET ORAL DAILY
Status: DISCONTINUED | OUTPATIENT
Start: 2018-10-06 | End: 2018-10-07 | Stop reason: HOSPADM

## 2018-10-06 RX ORDER — DEXTROSE MONOHYDRATE 25 G/50ML
12.5 INJECTION, SOLUTION INTRAVENOUS PRN
Status: DISCONTINUED | OUTPATIENT
Start: 2018-10-06 | End: 2018-10-07 | Stop reason: HOSPADM

## 2018-10-06 RX ORDER — SODIUM CHLORIDE 0.9 % (FLUSH) 0.9 %
10 SYRINGE (ML) INJECTION PRN
Status: DISCONTINUED | OUTPATIENT
Start: 2018-10-06 | End: 2018-10-07 | Stop reason: HOSPADM

## 2018-10-06 RX ORDER — POTASSIUM CHLORIDE 20MEQ/15ML
40 LIQUID (ML) ORAL PRN
Status: DISCONTINUED | OUTPATIENT
Start: 2018-10-06 | End: 2018-10-07 | Stop reason: HOSPADM

## 2018-10-06 RX ORDER — CALCITRIOL 0.25 UG/1
0.25 CAPSULE, LIQUID FILLED ORAL 3 TIMES DAILY
Status: DISCONTINUED | OUTPATIENT
Start: 2018-10-06 | End: 2018-10-07 | Stop reason: HOSPADM

## 2018-10-06 RX ADMIN — CALCITRIOL 0.25 MCG: 0.25 CAPSULE, LIQUID FILLED ORAL at 21:25

## 2018-10-06 RX ADMIN — PANTOPRAZOLE SODIUM 40 MG: 40 TABLET, DELAYED RELEASE ORAL at 21:25

## 2018-10-06 RX ADMIN — Medication 12.5 MG: at 21:25

## 2018-10-06 RX ADMIN — CARBIDOPA AND LEVODOPA 1 TABLET: 25; 100 TABLET, EXTENDED RELEASE ORAL at 21:25

## 2018-10-06 RX ADMIN — HEPARIN SODIUM 5000 UNITS: 5000 INJECTION INTRAVENOUS; SUBCUTANEOUS at 21:26

## 2018-10-06 RX ADMIN — HYDROCODONE BITARTRATE AND ACETAMINOPHEN 1 TABLET: 5; 325 TABLET ORAL at 22:21

## 2018-10-06 RX ADMIN — ONDANSETRON 4 MG: 4 TABLET, ORALLY DISINTEGRATING ORAL at 18:49

## 2018-10-06 RX ADMIN — MEROPENEM 1 G: 1 INJECTION INTRAVENOUS at 10:58

## 2018-10-06 RX ADMIN — SODIUM CHLORIDE 1000 ML: 9 INJECTION, SOLUTION INTRAVENOUS at 10:53

## 2018-10-06 RX ADMIN — ROPINIROLE 2 MG: 1 TABLET, FILM COATED ORAL at 21:25

## 2018-10-06 RX ADMIN — ALPRAZOLAM 0.25 MG: 0.25 TABLET ORAL at 22:20

## 2018-10-06 RX ADMIN — SPIRONOLACTONE 50 MG: 25 TABLET ORAL at 21:34

## 2018-10-06 ASSESSMENT — ENCOUNTER SYMPTOMS
ABDOMINAL DISTENTION: 1
CHEST TIGHTNESS: 0
ABDOMINAL PAIN: 1
NAUSEA: 1
COUGH: 0
PHOTOPHOBIA: 0
VOMITING: 0
STRIDOR: 0
BACK PAIN: 1
WHEEZING: 0
SHORTNESS OF BREATH: 1
SINUS PRESSURE: 1

## 2018-10-06 ASSESSMENT — PAIN SCALES - GENERAL
PAINLEVEL_OUTOF10: 8
PAINLEVEL_OUTOF10: 0

## 2018-10-06 ASSESSMENT — PAIN DESCRIPTION - FREQUENCY: FREQUENCY: INTERMITTENT

## 2018-10-06 ASSESSMENT — PAIN DESCRIPTION - PROGRESSION: CLINICAL_PROGRESSION: NOT CHANGED

## 2018-10-06 ASSESSMENT — PAIN DESCRIPTION - LOCATION: LOCATION: GENERALIZED

## 2018-10-06 ASSESSMENT — PAIN DESCRIPTION - DESCRIPTORS: DESCRIPTORS: ACHING

## 2018-10-06 ASSESSMENT — PAIN DESCRIPTION - PAIN TYPE: TYPE: CHRONIC PAIN

## 2018-10-06 NOTE — ED PROVIDER NOTES
the spine. Possibilities include finding   secondary to chronic renal disease with neoplasia such as myelomatous change   not excluded. 8.  Superior abdominal aorta minimally prominent. RECOMMENDATIONS:   Managing Abdominal Aortic Aneurysms      2.6-2.9 cm: Every 5 years*      *For abdominal aortas with maximum diameter of 2.6-2.9 cm meeting criteria   for AAA (>50% of proximal normal segment). Reference:      J Vasc Surg. 2009 Oct;50(4 Suppl):S2-49         XR CHEST PORTABLE   Final Result   CHF decompensation.                ED BEDSIDE ULTRASOUND:   Performed by ED Physician - none    LABS:  Labs Reviewed   COMPREHENSIVE METABOLIC PANEL W/ REFLEX TO MG FOR LOW K - Abnormal; Notable for the following:        Result Value    Glucose 122 (*)     CREATININE 1.70 (*)     Sodium 145 (*)     CO2 34 (*)     Total Bilirubin 0.20 (*)     GFR Non- 30 (*)     GFR  36 (*)     All other components within normal limits   CBC WITH AUTO DIFFERENTIAL - Abnormal; Notable for the following:     RBC 2.88 (*)     Hemoglobin 9.6 (*)     Hematocrit 29.0 (*)     .6 (*)     RDW 16.0 (*)     Seg Neutrophils 69 (*)     Lymphocytes 13 (*)     Eosinophils % 11 (*)     Absolute Eos # 1.20 (*)     All other components within normal limits   POC PANEL (G3)-INNA - Abnormal; Notable for the following:     pCO2, Inna 60 (*)     Total CO2, Venous 39 (*)     HCO3, Venous 37.1 (*)     Positive Base Excess, Inna 12 (*)     All other components within normal limits   URINE RT REFLEX TO CULTURE - Abnormal; Notable for the following:     Urine Hgb 1+ (*)     Leukocyte Esterase, Urine TRACE (*)     All other components within normal limits   CULTURE BLOOD #2   CULTURE BLOOD #2   URINE CULTURE CLEAN CATCH   LACTATE, SEPSIS   TROPONIN   CK   AMYLASE   LIPASE   MICROSCOPIC URINALYSIS   LACTATE, SEPSIS   BLOOD GAS, VENOUS       All other labs were within normal range or not returned as of this

## 2018-10-06 NOTE — H&P
Yes Historical Provider, MD   CALCIUM CITRATE PO Take 500 mg by mouth 3 times daily    Yes Historical Provider, MD   senna (SENOKOT) 8.6 MG tablet Take 2 tablets by mouth daily   Yes Historical Provider, MD   conjugated estrogens (PREMARIN) 0.625 MG/GM vaginal cream Place 0.5 g vaginally every other day 4/4/18  Yes Areli Randle MD   aspirin 81 MG tablet Take 81 mg by mouth daily    Yes Historical Provider, MD   rOPINIRole (REQUIP) 2 MG tablet Take 2 mg by mouth 3 times daily   Yes Historical Provider, MD   pantoprazole (PROTONIX) 40 MG tablet Take 1 tablet by mouth 2 times daily (before meals) 1/22/18  Yes Lesly Hill DO   BREO ELLIPTA 100-25 MCG/INH AEPB inhaler Inhale 1 puff into the lungs daily 1/22/18  Yes Lesly Hill DO   ferrous sulfate 325 (65 Fe) MG EC tablet Take 1 tablet by mouth 2 times daily (with meals) 12/21/17  Yes Jose Peerz, DO   vitamin D (CHOLECALCIFEROL) 5000 units CAPS capsule Take 5,000 Units by mouth daily   Yes Historical Provider, MD   rasagiline mesylate 1 MG TABS Take 1 tablet by mouth daily   Yes Historical Provider, MD   Oxygen Concentrator Dx: Pneumonia, use as directed. Patient taking differently: Dx: Pneumonia, use as directed. ON 24/7 2/10/17  Yes Lesly Hill DO        Allergies:     Dye [barium-containing compounds]; Pcn [penicillins]; Bactrim [sulfamethoxazole-trimethoprim]; and Red dye    Social History:     Tobacco:    reports that she quit smoking about 47 years ago. Her smoking use included Cigarettes. She has a 30.00 pack-year smoking history. She has never used smokeless tobacco.  Alcohol:      reports that she drinks about 1.2 oz of alcohol per week . Drug Use:  reports that she does not use drugs.     Family History:     Family History   Problem Relation Age of Onset    Heart Failure Mother     Hypertension Mother     Heart Disease Mother     High Blood Pressure Mother        Review of Systems:     Positive and Negative as described in HPI.    CONSTITUTIONAL:  Positive for fevers, chills, negative for sweats, fatigue, weight loss  HEENT:  negative for vision, hearing changes, runny nose, throat pain  RESPIRATORY:  negative for shortness of breath, cough, congestion, wheezing. CARDIOVASCULAR:  negative for chest pain, palpitations. GASTROINTESTINAL:  negative for nausea, vomiting, diarrhea, constipation, change in bowel habits, abdominal pain   GENITOURINARY:  negative for difficulty of urination, burning with urination, frequency   INTEGUMENT:  negative for rash, skin lesions, easy bruising   HEMATOLOGIC/LYMPHATIC:  negative for swelling/edema   ALLERGIC/IMMUNOLOGIC:  negative for urticaria , itching  ENDOCRINE:  negative increase in drinking, increase in urination, hot or cold intolerance  MUSCULOSKELETAL:  negative joint pains, muscle aches, swelling of joints  NEUROLOGICAL:  negative for headaches, dizziness, lightheadedness, numbness, pain, tingling extremities  BEHAVIOR/PSYCH:  negative for depression, anxiety    Physical Exam:   BP (!) 151/60   Pulse 67   Temp 98.1 °F (36.7 °C) (Oral)   Resp 16   Ht 5' 4\" (1.626 m)   Wt 184 lb (83.5 kg)   LMP 2004 (Within Years)   SpO2 93%   BMI 31.58 kg/m²   Temp (24hrs), Av.8 °F (36.6 °C), Min:97.3 °F (36.3 °C), Max:98.1 °F (36.7 °C)    Recent Labs      10/06/18   1603   POCGLU  124*       Intake/Output Summary (Last 24 hours) at 10/06/18 1756  Last data filed at 10/06/18 1232   Gross per 24 hour   Intake                0 ml   Output              200 ml   Net             -200 ml       General Appearance:  alert, Chronically ill appearing, and in no acute distress  Mental status: oriented to person, place, and time with normal affect  Head:  normocephalic, atraumatic.   Eye: no icterus, redness, pupils equal and reactive, extraocular eye movements intact, conjunctiva clear  Ear: normal external ear, no discharge, hearing intact  Nose:  no drainage noted  Mouth: mucous membranes moist  Neck: supple, no carotid bruits, thyroid not palpable  Lungs: Bilateral equal air entry, clear to auscultation, no wheezing, rales or rhonchi, normal effort  Cardiovascular: Irregularly irregular, no murmur, gallop, rub.   Abdomen: Soft, nontender, nondistended, normal bowel sounds, no hepatomegaly or splenomegaly  Neurologic: There are no new focal motor or sensory deficits, normal muscle tone and bulk, no abnormal sensation, normal speech, cranial nerves II through XII grossly intact  Skin: No gross lesions, rashes, bruising or bleeding on exposed skin area  Extremities:  peripheral pulses palpable, no pedal edema or calf pain with palpation  Psych: normal affect     Investigations:      Laboratory Testing:  Recent Results (from the past 24 hour(s))   EKG 12 Lead    Collection Time: 10/06/18 10:13 AM   Result Value Ref Range    Ventricular Rate 57 BPM    Atrial Rate 59 BPM    QRS Duration 84 ms    Q-T Interval 432 ms    QTc Calculation (Bazett) 420 ms    R Axis -24 degrees    T Axis 38 degrees   POC PANEL (G3)-INNA    Collection Time: 10/06/18 10:39 AM   Result Value Ref Range    pH, Inna 7.40 7.32 - 7.42    pCO2, Inna 60 (H) 39 - 55 mm Hg    pO2, Inna 48 30 - 50 mm Hg    Total CO2, Venous 39 (H) 25 - 31 mmol/L    HCO3, Venous 37.1 (H) 24 - 30 mmol/L    Negative Base Excess, Inna NOT REPORTED 0.0 - 2.0    Positive Base Excess, Inna 12 (H) 0.0 - 2.0    O2 Sat, Inna 82 %    Pt Temp NOT REPORTED     O2 Device/Flow/% NOT REPORTED     FIO2 NOT REPORTED     POC pH Temp NOT REPORTED     POC pCO2 Temp NOT REPORTED mm Hg    POC pO2 Temp NOT REPORTED mm Hg   Lactate, Sepsis    Collection Time: 10/06/18 10:45 AM   Result Value Ref Range    Lactic Acid, Sepsis 1.1 0.5 - 1.9 mmol/L    Lactic Acid, Sepsis, Whole Blood NOT REPORTED 0.5 - 1.9 mmol/L   Comprehensive Metabolic Panel w/ Reflex to MG    Collection Time: 10/06/18 10:45 AM   Result Value Ref Range    Glucose 122 (H) 70 - 99 mg/dL    BUN 22 8 - 23 mg/dL    CREATININE

## 2018-10-07 VITALS
BODY MASS INDEX: 31.41 KG/M2 | DIASTOLIC BLOOD PRESSURE: 59 MMHG | OXYGEN SATURATION: 98 % | HEIGHT: 64 IN | SYSTOLIC BLOOD PRESSURE: 148 MMHG | RESPIRATION RATE: 16 BRPM | TEMPERATURE: 97.9 F | HEART RATE: 66 BPM | WEIGHT: 184 LBS

## 2018-10-07 LAB
ANION GAP SERPL CALCULATED.3IONS-SCNC: 10 MMOL/L (ref 9–17)
BUN BLDV-MCNC: 20 MG/DL (ref 8–23)
BUN/CREAT BLD: 12 (ref 9–20)
CALCIUM SERPL-MCNC: 10.2 MG/DL (ref 8.6–10.4)
CHLORIDE BLD-SCNC: 99 MMOL/L (ref 98–107)
CO2: 34 MMOL/L (ref 20–31)
CREAT SERPL-MCNC: 1.65 MG/DL (ref 0.5–0.9)
CULTURE: NORMAL
GFR AFRICAN AMERICAN: 37 ML/MIN
GFR NON-AFRICAN AMERICAN: 31 ML/MIN
GFR SERPL CREATININE-BSD FRML MDRD: ABNORMAL ML/MIN/{1.73_M2}
GFR SERPL CREATININE-BSD FRML MDRD: ABNORMAL ML/MIN/{1.73_M2}
GLUCOSE BLD-MCNC: 108 MG/DL (ref 65–105)
GLUCOSE BLD-MCNC: 112 MG/DL (ref 70–99)
HCT VFR BLD CALC: 28.3 % (ref 36–46)
HEMOGLOBIN: 9.3 G/DL (ref 12–16)
INR BLD: 1
Lab: NORMAL
MCH RBC QN AUTO: 33.2 PG (ref 26–34)
MCHC RBC AUTO-ENTMCNC: 32.8 G/DL (ref 31–37)
MCV RBC AUTO: 101.3 FL (ref 80–100)
NRBC AUTOMATED: ABNORMAL PER 100 WBC
PARTIAL THROMBOPLASTIN TIME: 26 SEC (ref 23–31)
PDW BLD-RTO: 15.8 % (ref 11.5–14.5)
PLATELET # BLD: 188 K/UL (ref 130–400)
PMV BLD AUTO: 7.5 FL (ref 6–12)
POTASSIUM SERPL-SCNC: 4.2 MMOL/L (ref 3.7–5.3)
PROTHROMBIN TIME: 10.1 SEC (ref 9.7–11.6)
RBC # BLD: 2.79 M/UL (ref 4–5.2)
SODIUM BLD-SCNC: 143 MMOL/L (ref 135–144)
SPECIMEN DESCRIPTION: NORMAL
STATUS: NORMAL
WBC # BLD: 9.9 K/UL (ref 3.5–11)

## 2018-10-07 PROCEDURE — G8979 MOBILITY GOAL STATUS: HCPCS

## 2018-10-07 PROCEDURE — 6360000002 HC RX W HCPCS: Performed by: INTERNAL MEDICINE

## 2018-10-07 PROCEDURE — G0378 HOSPITAL OBSERVATION PER HR: HCPCS

## 2018-10-07 PROCEDURE — 6370000000 HC RX 637 (ALT 250 FOR IP): Performed by: INTERNAL MEDICINE

## 2018-10-07 PROCEDURE — G0008 ADMIN INFLUENZA VIRUS VAC: HCPCS | Performed by: INTERNAL MEDICINE

## 2018-10-07 PROCEDURE — 94640 AIRWAY INHALATION TREATMENT: CPT

## 2018-10-07 PROCEDURE — 36415 COLL VENOUS BLD VENIPUNCTURE: CPT

## 2018-10-07 PROCEDURE — 94664 DEMO&/EVAL PT USE INHALER: CPT

## 2018-10-07 PROCEDURE — 80048 BASIC METABOLIC PNL TOTAL CA: CPT

## 2018-10-07 PROCEDURE — 97530 THERAPEUTIC ACTIVITIES: CPT

## 2018-10-07 PROCEDURE — 97116 GAIT TRAINING THERAPY: CPT

## 2018-10-07 PROCEDURE — 99225 PR SBSQ OBSERVATION CARE/DAY 25 MINUTES: CPT | Performed by: INTERNAL MEDICINE

## 2018-10-07 PROCEDURE — 82947 ASSAY GLUCOSE BLOOD QUANT: CPT

## 2018-10-07 PROCEDURE — 97110 THERAPEUTIC EXERCISES: CPT

## 2018-10-07 PROCEDURE — 85730 THROMBOPLASTIN TIME PARTIAL: CPT

## 2018-10-07 PROCEDURE — G8978 MOBILITY CURRENT STATUS: HCPCS

## 2018-10-07 PROCEDURE — 85610 PROTHROMBIN TIME: CPT

## 2018-10-07 PROCEDURE — 85027 COMPLETE CBC AUTOMATED: CPT

## 2018-10-07 PROCEDURE — 2580000003 HC RX 258

## 2018-10-07 PROCEDURE — 97162 PT EVAL MOD COMPLEX 30 MIN: CPT

## 2018-10-07 PROCEDURE — 2700000000 HC OXYGEN THERAPY PER DAY

## 2018-10-07 PROCEDURE — 90686 IIV4 VACC NO PRSV 0.5 ML IM: CPT | Performed by: INTERNAL MEDICINE

## 2018-10-07 PROCEDURE — 96372 THER/PROPH/DIAG INJ SC/IM: CPT

## 2018-10-07 RX ORDER — FLUTICASONE FUROATE AND VILANTEROL 100; 25 UG/1; UG/1
1 POWDER RESPIRATORY (INHALATION) DAILY
Status: DISCONTINUED | OUTPATIENT
Start: 2018-10-07 | End: 2018-10-07 | Stop reason: HOSPADM

## 2018-10-07 RX ORDER — FUROSEMIDE 20 MG/1
40 TABLET ORAL DAILY
Status: DISCONTINUED | OUTPATIENT
Start: 2018-10-08 | End: 2018-10-07 | Stop reason: HOSPADM

## 2018-10-07 RX ADMIN — CARBIDOPA AND LEVODOPA 1 TABLET: 25; 100 TABLET, EXTENDED RELEASE ORAL at 10:45

## 2018-10-07 RX ADMIN — ATORVASTATIN CALCIUM 20 MG: 20 TABLET, FILM COATED ORAL at 10:45

## 2018-10-07 RX ADMIN — HEPARIN SODIUM 5000 UNITS: 5000 INJECTION INTRAVENOUS; SUBCUTANEOUS at 11:07

## 2018-10-07 RX ADMIN — PANTOPRAZOLE SODIUM 40 MG: 40 TABLET, DELAYED RELEASE ORAL at 10:46

## 2018-10-07 RX ADMIN — FUROSEMIDE 40 MG: 20 TABLET ORAL at 10:43

## 2018-10-07 RX ADMIN — CALCITRIOL 0.25 MCG: 0.25 CAPSULE, LIQUID FILLED ORAL at 13:28

## 2018-10-07 RX ADMIN — Medication 12.5 MG: at 10:44

## 2018-10-07 RX ADMIN — CARBIDOPA AND LEVODOPA 1 TABLET: 25; 100 TABLET, EXTENDED RELEASE ORAL at 13:27

## 2018-10-07 RX ADMIN — Medication 10 ML: at 11:15

## 2018-10-07 RX ADMIN — AMIODARONE HYDROCHLORIDE 200 MG: 200 TABLET ORAL at 10:45

## 2018-10-07 RX ADMIN — INFLUENZA A VIRUS A/MICHIGAN/45/2015 X-275 (H1N1) ANTIGEN (FORMALDEHYDE INACTIVATED), INFLUENZA A VIRUS A/SINGAPORE/INFIMH-16-0019/2016 IVR-186 (H3N2) ANTIGEN (FORMALDEHYDE INACTIVATED), INFLUENZA B VIRUS B/PHUKET/3073/2013 ANTIGEN (FORMALDEHYDE INACTIVATED), AND INFLUENZA B VIRUS B/MARYLAND/15/2016 BX-69A ANTIGEN (FORMALDEHYDE INACTIVATED) 0.5 ML: 15; 15; 15; 15 INJECTION, SUSPENSION INTRAMUSCULAR at 13:23

## 2018-10-07 RX ADMIN — SPIRONOLACTONE 50 MG: 25 TABLET ORAL at 11:07

## 2018-10-07 RX ADMIN — CALCITRIOL 0.25 MCG: 0.25 CAPSULE, LIQUID FILLED ORAL at 10:44

## 2018-10-07 RX ADMIN — RASAGILINE 1 MG: 1 TABLET ORAL at 10:43

## 2018-10-07 RX ADMIN — FLUTICASONE FUROATE AND VILANTEROL 1 PUFF: 100; 25 POWDER RESPIRATORY (INHALATION) at 13:23

## 2018-10-07 RX ADMIN — ROPINIROLE 2 MG: 1 TABLET, FILM COATED ORAL at 13:28

## 2018-10-07 RX ADMIN — IPRATROPIUM BROMIDE AND ALBUTEROL SULFATE 3 ML: .5; 3 SOLUTION RESPIRATORY (INHALATION) at 14:17

## 2018-10-07 RX ADMIN — ROPINIROLE 2 MG: 1 TABLET, FILM COATED ORAL at 10:46
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
## 2018-10-07 NOTE — PROGRESS NOTES
The pt was discharged to home. The discharge instructions were given and reviewed with the pt and her . Her home O2 was applied at 2 LPM.  She was escorted to the front door per wheelchair in stable condition.

## 2018-10-07 NOTE — CARE COORDINATION
Case Management Initial Discharge Plan  Claudeen Fitcradhames,         Readmission Risk              Risk of Unplanned Readmission:        41               Met with:patient to discuss discharge plans. Information verified: address, contacts, phone number, , insurance Yes  PCP: Tonya Preston DO  Date of last visit:     Insurance Provider: medicare and MMO    Discharge Planning  Current Residence:  house  Living Arrangements:  Spouse/Significant Other, Children   Home has 1 stories/2 stairs to climb  Support Systems:  Spouse/Significant Other, Children  Current Services PTA:   Skilled nursing, PT/OT Agency: [facility]  Patient able to perform ADL's:Assisted  DME in home:   thru PC, CPAP, walker, cane, shower chair  DME used to aid ambulation prior to admission:   walker  DME used during admission:  walker    Potential Assistance Needed:  Home Care, Other (Comment) ([facility])    Pharmacy: [facility] Medications:  Yes  Does patient want to participate in local refill/ meds to beds program?  Yes    Patient agreeable to home care: Yes  Freedom of choice provided:  yes      Type of Home Care Services:  Nursing Services  Patient expects to be discharged to:  home    Prior SNF/Rehab Placement and Facility: [facility]  Agreeable to SNF/Rehab: No  [place] of choice provided: n/a   Evaluation: no    Expected Discharge date:  10/08/18  Follow Up Appointment: Best Day/ Time: Thursday PM    Transportation provider: spouse  Transportation arrangements needed for discharge: No    Discharge Plan: Met with pt at bedside. Pt admitted with fever and chills. States she is still nauseated and has chills. Pt hoping to go home today. Pt lives with her spouse and dtr. States she has 24 hr care at home. Pt hs private pay caregiver 5 days a week and current home care services with [address] [place] [address] for skilled nursing, PT/OT. Pt plans to return home with current services. ROLANDA initiated. Pt has f/u appt with Dr. Renee Cervantes on 10/10. Appt added to dc instructions.          Electronically signed by Errol Tong RN on 10/7/18 at 10:21 AM

## 2018-10-07 NOTE — DISCHARGE SUMMARY
Surg. 2009 Oct;50(4 Suppl):S2-49     Xr Chest Portable    Result Date: 10/6/2018  EXAMINATION: SINGLE XRAY VIEW OF THE CHEST 10/6/2018 11:46 am COMPARISON: 09/22/2018 HISTORY: ORDERING SYSTEM PROVIDED HISTORY: chest pain TECHNOLOGIST PROVIDED HISTORY: chest pain Ordering Physician Provided Reason for Exam: Chest pain. Hx of CHF Acuity: Acute Type of Exam: Initial FINDINGS: Enlarged heart with pulmonary venous congestion. No pneumothorax is seen. Small right-sided effusion. Osseous structures show age compatible degeneration. CHF decompensation. Xr Chest Portable    Result Date: 9/22/2018  EXAMINATION: SINGLE XRAY VIEW OF THE CHEST 9/22/2018 10:48 pm COMPARISON: 09/05/2018 HISTORY: ORDERING SYSTEM PROVIDED HISTORY: sob TECHNOLOGIST PROVIDED HISTORY: sob Ordering Physician Provided Reason for Exam: SOB Acuity: Acute Type of Exam: Initial FINDINGS: Enlarged heart shadow. No focal consolidation. No significant pleural effusion. No pneumothorax. 1. No focal airspace disease 2. Cardiomegaly is stable     Ir Ultrasound Guidance Vascular Access    Result Date: 9/26/2018  Radiology exam is complete. No Radiologist dictation. Please follow up with ordering provider. Consultations:    Consults:     Final Specialist Recommendations/Findings:   IP CONSULT TO HOSPITALIST  IP CONSULT TO HOSPITALIST      The patient was seen and examined on day of discharge and this discharge summary is in conjunction with any daily progress note from day of discharge.     Discharge plan:     Disposition: Home with [facility]    Physician Follow Up:   Mary Whitmore DO  [address] [place] [address]  [phone]    Go on 10/10/2018  Hospital follow up at 1:30 pm    [id number]   [address] [place] [address]  [phone]           Requiring Further Evaluation/Follow Up POST HOSPITALIZATION/Incidental Findings: Final urine culture results    Diet: cardiac diet and mesylate 1 MG Tabs     rOPINIRole 2 MG tablet  Commonly known as:  REQUIP     senna 8.6 MG tablet  Commonly known as:  SENOKOT     SPIRIVA RESPIMAT 2.5 MCG/ACT Aers inhaler  Generic drug:  tiotropium  Inhale 2 puffs into the lungs daily     spironolactone 50 MG tablet  Commonly known as:  ALDACTONE     vitamin D 5000 units Caps capsule  Commonly known as:  CHOLECALCIFEROL        STOP taking these medications    butalbital-acetaminophen-caffeine -40 MG per tablet  Commonly known as:  FIORICET, ESGIC            Time Spent on discharge is  24 minutes in patient examination, evaluation, counseling as well as medication reconciliation, prescriptions for required medications, discharge plan and follow up. Electronically signed by   Dora Fine DO  10/7/2018  2:34 PM      Thank you Dr. Waldemar Ly DO for the opportunity to be involved in this patient's care.

## 2018-10-07 NOTE — PROGRESS NOTES
[facility]    Progress Note    10/7/2018    12:37 PM    Name:   Gulshan Lopez  MRN:     [id number]     Acct:      [de-identified]   Room:   2103/2[facility] Day:  0  Admit Date:  10/6/2018  9:59 AM    PCP:   Mariah Velazco DO  Code Status:  Full Code    Subjective:     C/C:   Chief Complaint   Patient presents with    Shortness of Breath    Chills     Interval History Status: improved. Patient has had no further fevers. Denies any chest pain, shortness of breath, nausea or vomiting. Brief History: This is a 68-year-old white female who is admitted with a complaint of fever and concern for recurrent urinary tract infection. She recently completed treatment for ESBL E. coli urinary tract infection headaches and carefully returned home. Yesterday she developed a fever of approximately 101 and presented the emergency room after evaluation by the home care nurse. Review of Systems:     Constitutional:  negative for chills, fevers, sweats  Respiratory:  negative for cough, dyspnea on exertion, shortness of breath, wheezing  Cardiovascular:  negative for chest pain, chest pressure/discomfort, lower extremity edema, palpitations  Gastrointestinal:  negative for abdominal pain, constipation, diarrhea, nausea, vomiting  Neurological:  negative for dizziness, headache    Medications: Allergies: Allergies   Allergen Reactions    Dye [Barium-Containing Compounds] Other (See Comments)     Cause Afib per     Pcn [Penicillins] Itching and Swelling    Bactrim [Sulfamethoxazole-Trimethoprim] Other (See Comments)     Allergic Nephritis    Red Dye Itching and Rash     This allergy is likely incorrect:  Per patient's  she has no issue with medications that have red dye in them or red food.  He thinks this reaction was added to chart when the pt had a colonoscopy (possibly barium or iodine dye instead)       Current Meds:   Scheduled Hyperplastic polyp of intestine; Hypertension; Impaired ambulation; Kidney stone; Morbid obesity with BMI of 40.0-44.9, adult ([facility] [address] ECTOR on CPAP; Osteoarthritis; Parkinson disease ([facility] [address] Restless leg syndrome; Spinal stenosis in cervical region; Type II or unspecified type diabetes mellitus without mention of complication, not stated as uncontrolled; Unspecified sleep apnea; Urethral caruncle; and Wears glasses. Social History:   reports that she quit smoking about 47 years ago. Her smoking use included Cigarettes. She has a 30.00 pack-year smoking history. She has never used smokeless tobacco. She reports that she drinks about 1.2 oz of alcohol per week . She reports that she does not use drugs. Family History:   Family History   Problem Relation Age of Onset    Heart Failure Mother     Hypertension Mother     Heart Disease Mother     High Blood Pressure Mother        Vitals:  BP (!) 148/59   Pulse 66   Temp 97.9 °F (36.6 °C) (Oral)   Resp 16   Ht 5' 4\" (1.626 m)   Wt 184 lb (83.5 kg)   LMP 2004 (Within Years)   SpO2 98%   BMI 31.58 kg/m²   Temp (24hrs), Av.9 °F (36.6 °C), Min:97.3 °F (36.3 °C), Max:98.2 °F (36.8 °C)    Recent Labs      10/06/18   1603  10/06/18   2113  10/07/18   1219   POCGLU  124*  109*  108*       I/O (24Hr):   No intake or output data in the 24 hours ending 10/07/18 1237    Labs:    Hematology:  Recent Labs      10/06/18   1045  10/07/18   0520   WBC  10.7  9.9   RBC  2.88*  2.79*   HGB  9.6*  9.3*   HCT  29.0*  28.3*   MCV  100.6*  101.3*   MCH  33.5  33.2   MCHC  33.3  32.8   RDW  16.0*  15.8*   PLT  209  188   MPV  7.8  7.5   INR   --   1.0     Chemistry:  Recent Labs      10/06/18   1045  10/07/18   0520   NA  145*  143   K  4.3  4.2   CL  98  99   CO2  34*  34*   GLUCOSE  122*  112*   BUN  22  20   CREATININE  1.70*  1.65*   ANIONGAP  13  10   LABGLOM  30*  31*   GFRAA  36*  37*   CALCIUM  9.9  10.2   PROBNP  819*   --    TROPONINT  <0.03   --    CKTOTAL  45

## 2018-10-08 LAB
EKG ATRIAL RATE: 59 BPM
EKG Q-T INTERVAL: 432 MS
EKG QRS DURATION: 84 MS
EKG QTC CALCULATION (BAZETT): 420 MS
EKG R AXIS: -24 DEGREES
EKG T AXIS: 38 DEGREES
EKG VENTRICULAR RATE: 57 BPM

## 2018-10-10 ENCOUNTER — OFFICE VISIT (OUTPATIENT)
Dept: FAMILY MEDICINE CLINIC | Age: 73
End: 2018-10-10
Payer: COMMERCIAL

## 2018-10-10 VITALS
DIASTOLIC BLOOD PRESSURE: 61 MMHG | SYSTOLIC BLOOD PRESSURE: 155 MMHG | OXYGEN SATURATION: 97 % | BODY MASS INDEX: 31.41 KG/M2 | HEIGHT: 64 IN | HEART RATE: 61 BPM | TEMPERATURE: 97.6 F | WEIGHT: 184 LBS | RESPIRATION RATE: 16 BRPM

## 2018-10-10 DIAGNOSIS — E11.22 CONTROLLED TYPE 2 DIABETES MELLITUS WITH STAGE 3 CHRONIC KIDNEY DISEASE, WITHOUT LONG-TERM CURRENT USE OF INSULIN (HCC): Primary | ICD-10-CM

## 2018-10-10 DIAGNOSIS — I50.32 CHRONIC DIASTOLIC CONGESTIVE HEART FAILURE (HCC): ICD-10-CM

## 2018-10-10 DIAGNOSIS — Z12.39 SCREENING FOR BREAST CANCER: ICD-10-CM

## 2018-10-10 DIAGNOSIS — J96.11 CHRONIC RESPIRATORY FAILURE WITH HYPOXIA AND HYPERCAPNIA (HCC): ICD-10-CM

## 2018-10-10 DIAGNOSIS — N18.30 CONTROLLED TYPE 2 DIABETES MELLITUS WITH STAGE 3 CHRONIC KIDNEY DISEASE, WITHOUT LONG-TERM CURRENT USE OF INSULIN (HCC): Primary | ICD-10-CM

## 2018-10-10 DIAGNOSIS — J96.11 CHRONIC RESPIRATORY FAILURE WITH HYPOXIA (HCC): ICD-10-CM

## 2018-10-10 DIAGNOSIS — J96.12 CHRONIC RESPIRATORY FAILURE WITH HYPOXIA AND HYPERCAPNIA (HCC): ICD-10-CM

## 2018-10-10 DIAGNOSIS — Z09 HOSPITAL DISCHARGE FOLLOW-UP: ICD-10-CM

## 2018-10-10 DIAGNOSIS — G89.4 CHRONIC PAIN SYNDROME: ICD-10-CM

## 2018-10-10 PROCEDURE — 1101F PT FALLS ASSESS-DOCD LE1/YR: CPT | Performed by: FAMILY MEDICINE

## 2018-10-10 PROCEDURE — 1090F PRES/ABSN URINE INCON ASSESS: CPT | Performed by: FAMILY MEDICINE

## 2018-10-10 PROCEDURE — 2022F DILAT RTA XM EVC RTNOPTHY: CPT | Performed by: FAMILY MEDICINE

## 2018-10-10 PROCEDURE — 3046F HEMOGLOBIN A1C LEVEL >9.0%: CPT | Performed by: FAMILY MEDICINE

## 2018-10-10 PROCEDURE — 1036F TOBACCO NON-USER: CPT | Performed by: FAMILY MEDICINE

## 2018-10-10 PROCEDURE — 1123F ACP DISCUSS/DSCN MKR DOCD: CPT | Performed by: FAMILY MEDICINE

## 2018-10-10 PROCEDURE — 4040F PNEUMOC VAC/ADMIN/RCVD: CPT | Performed by: FAMILY MEDICINE

## 2018-10-10 PROCEDURE — 1111F DSCHRG MED/CURRENT MED MERGE: CPT | Performed by: FAMILY MEDICINE

## 2018-10-10 PROCEDURE — 83036 HEMOGLOBIN GLYCOSYLATED A1C: CPT | Performed by: FAMILY MEDICINE

## 2018-10-10 PROCEDURE — G8598 ASA/ANTIPLAT THER USED: HCPCS | Performed by: FAMILY MEDICINE

## 2018-10-10 PROCEDURE — G8482 FLU IMMUNIZE ORDER/ADMIN: HCPCS | Performed by: FAMILY MEDICINE

## 2018-10-10 PROCEDURE — 3017F COLORECTAL CA SCREEN DOC REV: CPT | Performed by: FAMILY MEDICINE

## 2018-10-10 PROCEDURE — 99214 OFFICE O/P EST MOD 30 MIN: CPT | Performed by: FAMILY MEDICINE

## 2018-10-10 PROCEDURE — G8427 DOCREV CUR MEDS BY ELIG CLIN: HCPCS | Performed by: FAMILY MEDICINE

## 2018-10-10 PROCEDURE — G8400 PT W/DXA NO RESULTS DOC: HCPCS | Performed by: FAMILY MEDICINE

## 2018-10-10 PROCEDURE — G8417 CALC BMI ABV UP PARAM F/U: HCPCS | Performed by: FAMILY MEDICINE

## 2018-10-10 RX ORDER — HYDROCODONE BITARTRATE AND ACETAMINOPHEN 5; 325 MG/1; MG/1
1 TABLET ORAL 2 TIMES DAILY PRN
Qty: 60 TABLET | Refills: 0 | Status: CANCELLED | OUTPATIENT
Start: 2018-10-10 | End: 2018-11-09

## 2018-10-10 ASSESSMENT — PATIENT HEALTH QUESTIONNAIRE - PHQ9
SUM OF ALL RESPONSES TO PHQ9 QUESTIONS 1 & 2: 0
SUM OF ALL RESPONSES TO PHQ QUESTIONS 1-9: 0
2. FEELING DOWN, DEPRESSED OR HOPELESS: 0
SUM OF ALL RESPONSES TO PHQ QUESTIONS 1-9: 0
1. LITTLE INTEREST OR PLEASURE IN DOING THINGS: 0

## 2018-10-10 NOTE — PROGRESS NOTES
[address]  [facility] [address] [facility] [address]  Dept: [phone]      Franca Hardy is a 72 y.o. female who presents today for follow up on her  medical conditions as noted below. Chief Complaint   Patient presents with    Diabetes     3 month check, refill tradjenta, norco    Follow-Up from Hospital     f/u rehab on 9/27. she was on IV antibiotics due to a UTI. her uti symptoms are better but states she still doesn't feel well.        Patient Active Problem List:     Controlled type 2 diabetes mellitus with stage 3 chronic kidney disease, without long-term current use of insulin (HCC)     Hyperlipidemia     Essential hypertension     Chronic leg pain     Chronic atrial fibrillation (HCC)     Asthma     Gastroesophageal reflux disease without esophagitis     ECTOR on CPAP     Type 2 diabetes mellitus without complication, without long-term current use of insulin (HCC)     Spinal stenosis in cervical region     Hypomagnesemia     Hyperphosphaturia     Hypocalcemia     Parkinson's disease ([facility] [address]     Acute renal failure (HCC)     Acute cystitis without hematuria     Altered mental status     Iron deficiency anemia due to chronic blood loss     Morbid obesity with BMI of 40.0-44.9, adult (HCC)     Hyperplastic polyp of intestine     Diverticulosis     Panlobular emphysema (HCC)     Rapid atrial fibrillation (HCC)     CKD (chronic kidney disease) stage 3, GFR 30-59 ml/min (HCC)     Anemia in chronic kidney disease     Chronic respiratory failure with hypoxia and hypercapnia (HCC)     CKD stage 3 due to type 2 diabetes mellitus (HCC)     E. coli UTI (urinary tract infection)     Nephrolithiasis     Candidal intertrigo     Venous insufficiency     Malabsorption     Anemia of chronic renal failure, stage 4 (severe) ([facility])     Iron deficiency     Coronary atherosclerosis     COPD exacerbation (HCC)     Restless leg syndrome     SIRS (systemic inflammatory response blood in stool. Endocrine: Negative for cold intolerance and polyuria. Genitourinary: Negative for dysuria and hematuria. Musculoskeletal: Negative. Skin: Negative for rash. Allergic/Immunologic: Negative. Neurological: Negative. Negative for dizziness, weakness and numbness. Hematological: Negative. Negative for adenopathy. Does not bruise/bleed easily. Psychiatric/Behavioral: Negative for sleep disturbance, dysphoric mood and  decreased concentration. The patient is not nervous/anxious. Objective:     Physical Exam:     Nursing note and vitals reviewed. BP (!) 155/61   Pulse 61   Temp 97.6 °F (36.4 °C) (Oral)   Resp 16   Ht 5' 4.02\" (1.626 m)   Wt 184 lb (83.5 kg)   LMP 04/03/2004 (Within Years)   SpO2 97%   Breastfeeding? No   BMI 31.57 kg/m²   Constitutional: She is oriented to person, place, and time. She   appears well-developed and well-nourished. HENT:   Head: Normocephalic and atraumatic. Right Ear: External ear normal. Tympanic membrane is not erythematous. No middle ear effusion. Left Ear: External ear normal. Tympanic membrane is not erythematous. No middle ear effusion. Nose: No mucosal edema. Mouth/Throat: Oropharynx is clear and moist. No posterior oropharyngeal erythema. Eyes: Conjunctivae and EOM are normal. Pupils are equal, round, and reactive to light. Neck: Normal range of motion. Neck supple. No thyromegaly present. Cardiovascular: Normal rate, regular rhythm and normal heart sounds. No murmur heard. Pulmonary/Chest: Effort normal and breath sounds normal. She has no wheezes. Shehas no rales. Abdominal: Soft. Bowel sounds are normal. She exhibits no distension and no mass. There is no tenderness. There is no rebound and no guarding. Genitourinary/Anorectal:deferred  Musculoskeletal: Normal range of motion. She exhibits no edema or tenderness. Lymphadenopathy: She has no cervical adenopathy.    Neurological: She is alert and

## 2018-10-12 LAB
CULTURE: NORMAL
CULTURE: NORMAL
Lab: NORMAL
Lab: NORMAL
SPECIMEN DESCRIPTION: NORMAL
SPECIMEN DESCRIPTION: NORMAL
STATUS: NORMAL
STATUS: NORMAL

## 2018-10-17 ENCOUNTER — HOSPITAL ENCOUNTER (OUTPATIENT)
Facility: MEDICAL CENTER | Age: 73
End: 2018-10-17
Payer: COMMERCIAL

## 2018-10-19 ENCOUNTER — PATIENT MESSAGE (OUTPATIENT)
Dept: FAMILY MEDICINE CLINIC | Age: 73
End: 2018-10-19

## 2018-10-19 RX ORDER — NYSTATIN 100000 [USP'U]/G
POWDER TOPICAL
Qty: 60 G | Refills: 5 | Status: SHIPPED | OUTPATIENT
Start: 2018-10-19 | End: 2019-07-03 | Stop reason: ALTCHOICE

## 2018-10-21 ENCOUNTER — PATIENT MESSAGE (OUTPATIENT)
Dept: FAMILY MEDICINE CLINIC | Age: 73
End: 2018-10-21

## 2018-10-23 ENCOUNTER — HOSPITAL ENCOUNTER (OUTPATIENT)
Dept: INFUSION THERAPY | Facility: MEDICAL CENTER | Age: 73
Discharge: HOME | End: 2018-10-23
Payer: COMMERCIAL

## 2018-10-23 ENCOUNTER — HOSPITAL ENCOUNTER (OUTPATIENT)
Facility: MEDICAL CENTER | Age: 73
End: 2018-10-23
Payer: COMMERCIAL

## 2018-10-23 ENCOUNTER — TELEPHONE (OUTPATIENT)
Dept: ONCOLOGY | Age: 73
End: 2018-10-23

## 2018-10-23 ENCOUNTER — OFFICE VISIT (OUTPATIENT)
Dept: ONCOLOGY | Age: 73
End: 2018-10-23
Payer: COMMERCIAL

## 2018-10-23 VITALS
HEART RATE: 64 BPM | BODY MASS INDEX: 33.86 KG/M2 | RESPIRATION RATE: 18 BRPM | HEIGHT: 62 IN | DIASTOLIC BLOOD PRESSURE: 54 MMHG | TEMPERATURE: 97.8 F | SYSTOLIC BLOOD PRESSURE: 140 MMHG | WEIGHT: 184 LBS

## 2018-10-23 DIAGNOSIS — E61.1 IRON DEFICIENCY: ICD-10-CM

## 2018-10-23 DIAGNOSIS — D64.9 ANEMIA, UNSPECIFIED TYPE: ICD-10-CM

## 2018-10-23 DIAGNOSIS — D63.1 ANEMIA OF CHRONIC RENAL FAILURE, STAGE 4 (SEVERE) (HCC): ICD-10-CM

## 2018-10-23 DIAGNOSIS — N18.4 ANEMIA OF CHRONIC RENAL FAILURE, STAGE 4 (SEVERE) (HCC): Primary | ICD-10-CM

## 2018-10-23 DIAGNOSIS — D63.1 ANEMIA OF CHRONIC RENAL FAILURE, STAGE 4 (SEVERE) (HCC): Primary | ICD-10-CM

## 2018-10-23 DIAGNOSIS — K90.89 OTHER SPECIFIED INTESTINAL MALABSORPTION: ICD-10-CM

## 2018-10-23 DIAGNOSIS — N18.4 ANEMIA OF CHRONIC RENAL FAILURE, STAGE 4 (SEVERE) (HCC): ICD-10-CM

## 2018-10-23 DIAGNOSIS — D53.9 MACROCYTIC ANEMIA: ICD-10-CM

## 2018-10-23 LAB
ABSOLUTE EOS #: 0.7 K/UL (ref 0–0.4)
ABSOLUTE IMMATURE GRANULOCYTE: ABNORMAL K/UL (ref 0–0.3)
ABSOLUTE LYMPH #: 1.4 K/UL (ref 1–4.8)
ABSOLUTE MONO #: 0.5 K/UL (ref 0.2–0.8)
BASOPHILS # BLD: 0 % (ref 0–2)
BASOPHILS ABSOLUTE: 0 K/UL (ref 0–0.2)
DIFFERENTIAL TYPE: ABNORMAL
EOSINOPHILS RELATIVE PERCENT: 9 % (ref 1–4)
HCT VFR BLD CALC: 28.5 % (ref 36–46)
HEMOGLOBIN: 9.5 G/DL (ref 12–16)
IMMATURE GRANULOCYTES: ABNORMAL %
LYMPHOCYTES # BLD: 18 % (ref 24–44)
MCH RBC QN AUTO: 32.9 PG (ref 26–34)
MCHC RBC AUTO-ENTMCNC: 33.5 G/DL (ref 31–37)
MCV RBC AUTO: 98.3 FL (ref 80–100)
MONOCYTES # BLD: 7 % (ref 1–7)
NRBC AUTOMATED: ABNORMAL PER 100 WBC
PDW BLD-RTO: 15.1 % (ref 11.5–14.5)
PLATELET # BLD: 115 K/UL (ref 130–400)
PLATELET ESTIMATE: ABNORMAL
PMV BLD AUTO: 8.2 FL (ref 6–12)
RBC # BLD: 2.9 M/UL (ref 4–5.2)
RBC # BLD: ABNORMAL 10*6/UL
SEG NEUTROPHILS: 66 % (ref 36–66)
SEGMENTED NEUTROPHILS ABSOLUTE COUNT: 5.1 K/UL (ref 1.8–7.7)
WBC # BLD: 7.7 K/UL (ref 3.5–11)
WBC # BLD: ABNORMAL 10*3/UL

## 2018-10-23 PROCEDURE — 1090F PRES/ABSN URINE INCON ASSESS: CPT | Performed by: INTERNAL MEDICINE

## 2018-10-23 PROCEDURE — 99214 OFFICE O/P EST MOD 30 MIN: CPT | Performed by: INTERNAL MEDICINE

## 2018-10-23 PROCEDURE — G8400 PT W/DXA NO RESULTS DOC: HCPCS | Performed by: INTERNAL MEDICINE

## 2018-10-23 PROCEDURE — 85025 COMPLETE CBC W/AUTO DIFF WBC: CPT

## 2018-10-23 PROCEDURE — 3017F COLORECTAL CA SCREEN DOC REV: CPT | Performed by: INTERNAL MEDICINE

## 2018-10-23 PROCEDURE — G8427 DOCREV CUR MEDS BY ELIG CLIN: HCPCS | Performed by: INTERNAL MEDICINE

## 2018-10-23 PROCEDURE — 36415 COLL VENOUS BLD VENIPUNCTURE: CPT

## 2018-10-23 PROCEDURE — 4040F PNEUMOC VAC/ADMIN/RCVD: CPT | Performed by: INTERNAL MEDICINE

## 2018-10-23 PROCEDURE — 1101F PT FALLS ASSESS-DOCD LE1/YR: CPT | Performed by: INTERNAL MEDICINE

## 2018-10-23 PROCEDURE — 99211 OFF/OP EST MAY X REQ PHY/QHP: CPT

## 2018-10-23 PROCEDURE — 6360000002 HC RX W HCPCS: Performed by: INTERNAL MEDICINE

## 2018-10-23 PROCEDURE — 1036F TOBACCO NON-USER: CPT | Performed by: INTERNAL MEDICINE

## 2018-10-23 PROCEDURE — G8417 CALC BMI ABV UP PARAM F/U: HCPCS | Performed by: INTERNAL MEDICINE

## 2018-10-23 PROCEDURE — 1111F DSCHRG MED/CURRENT MED MERGE: CPT | Performed by: INTERNAL MEDICINE

## 2018-10-23 PROCEDURE — 96372 THER/PROPH/DIAG INJ SC/IM: CPT

## 2018-10-23 PROCEDURE — 1123F ACP DISCUSS/DSCN MKR DOCD: CPT | Performed by: INTERNAL MEDICINE

## 2018-10-23 PROCEDURE — G8482 FLU IMMUNIZE ORDER/ADMIN: HCPCS | Performed by: INTERNAL MEDICINE

## 2018-10-23 PROCEDURE — G8598 ASA/ANTIPLAT THER USED: HCPCS | Performed by: INTERNAL MEDICINE

## 2018-10-23 RX ORDER — CYANOCOBALAMIN 1000 UG/ML
1000 INJECTION INTRAMUSCULAR; SUBCUTANEOUS ONCE
Status: CANCELLED
Start: 2018-10-23

## 2018-10-23 RX ORDER — CYANOCOBALAMIN 1000 UG/ML
1000 INJECTION INTRAMUSCULAR; SUBCUTANEOUS ONCE
Status: COMPLETED
Start: 2018-10-23 | End: 2018-10-23

## 2018-10-23 RX ORDER — ACETAMINOPHEN 325 MG/1
325 TABLET ORAL
Status: CANCELLED | OUTPATIENT
Start: 2018-10-23

## 2018-10-23 RX ORDER — BLOOD SUGAR DIAGNOSTIC
1 STRIP MISCELLANEOUS DAILY
Qty: 100 EACH | Refills: 3 | Status: SHIPPED | OUTPATIENT
Start: 2018-10-23 | End: 2019-02-25 | Stop reason: SDUPTHER

## 2018-10-23 RX ORDER — LANCETS 33 GAUGE
1 EACH MISCELLANEOUS 2 TIMES DAILY
Qty: 100 EACH | Refills: 3 | Status: ON HOLD | OUTPATIENT
Start: 2018-10-23 | End: 2019-09-11 | Stop reason: HOSPADM

## 2018-10-23 RX ADMIN — CYANOCOBALAMIN 1000 MCG: 1000 INJECTION, SOLUTION INTRAMUSCULAR at 10:06

## 2018-10-23 RX ADMIN — DARBEPOETIN ALFA 100 MCG: 100 INJECTION, SOLUTION INTRAVENOUS; SUBCUTANEOUS at 10:43

## 2018-10-23 NOTE — PROGRESS NOTES
Nisreen Mulligan added on to MD visit after recent hospitalization. Pt received aranesp in hospital.  No lab work done today. Pharmacist asked MD if CDP needed to be drawn. Ordered CBC. Results back 9.5 hemoglobin. Aranesp to  Upper left arm sc. Vitamin B into left deltoid without difficulty. Pt back in three weeks with lab work.

## 2018-11-15 ENCOUNTER — OFFICE VISIT (OUTPATIENT)
Dept: FAMILY MEDICINE CLINIC | Age: 73
End: 2018-11-15
Payer: COMMERCIAL

## 2018-11-15 VITALS
SYSTOLIC BLOOD PRESSURE: 130 MMHG | HEIGHT: 62 IN | OXYGEN SATURATION: 99 % | DIASTOLIC BLOOD PRESSURE: 60 MMHG | RESPIRATION RATE: 14 BRPM | BODY MASS INDEX: 32.94 KG/M2 | TEMPERATURE: 97.5 F | HEART RATE: 61 BPM | WEIGHT: 179 LBS

## 2018-11-15 DIAGNOSIS — R60.0 EDEMA OF BOTH LEGS: ICD-10-CM

## 2018-11-15 DIAGNOSIS — N18.30 CONTROLLED TYPE 2 DIABETES MELLITUS WITH STAGE 3 CHRONIC KIDNEY DISEASE, WITHOUT LONG-TERM CURRENT USE OF INSULIN (HCC): Primary | ICD-10-CM

## 2018-11-15 DIAGNOSIS — I10 ESSENTIAL HYPERTENSION: ICD-10-CM

## 2018-11-15 DIAGNOSIS — E11.22 CONTROLLED TYPE 2 DIABETES MELLITUS WITH STAGE 3 CHRONIC KIDNEY DISEASE, WITHOUT LONG-TERM CURRENT USE OF INSULIN (HCC): Primary | ICD-10-CM

## 2018-11-15 PROCEDURE — G8417 CALC BMI ABV UP PARAM F/U: HCPCS | Performed by: FAMILY MEDICINE

## 2018-11-15 PROCEDURE — 2022F DILAT RTA XM EVC RTNOPTHY: CPT | Performed by: FAMILY MEDICINE

## 2018-11-15 PROCEDURE — G8482 FLU IMMUNIZE ORDER/ADMIN: HCPCS | Performed by: FAMILY MEDICINE

## 2018-11-15 PROCEDURE — 99214 OFFICE O/P EST MOD 30 MIN: CPT | Performed by: FAMILY MEDICINE

## 2018-11-15 PROCEDURE — 4040F PNEUMOC VAC/ADMIN/RCVD: CPT | Performed by: FAMILY MEDICINE

## 2018-11-15 PROCEDURE — 3046F HEMOGLOBIN A1C LEVEL >9.0%: CPT | Performed by: FAMILY MEDICINE

## 2018-11-15 PROCEDURE — 1090F PRES/ABSN URINE INCON ASSESS: CPT | Performed by: FAMILY MEDICINE

## 2018-11-15 PROCEDURE — G8427 DOCREV CUR MEDS BY ELIG CLIN: HCPCS | Performed by: FAMILY MEDICINE

## 2018-11-15 PROCEDURE — 3017F COLORECTAL CA SCREEN DOC REV: CPT | Performed by: FAMILY MEDICINE

## 2018-11-15 PROCEDURE — 1036F TOBACCO NON-USER: CPT | Performed by: FAMILY MEDICINE

## 2018-11-15 PROCEDURE — G8400 PT W/DXA NO RESULTS DOC: HCPCS | Performed by: FAMILY MEDICINE

## 2018-11-15 PROCEDURE — G8598 ASA/ANTIPLAT THER USED: HCPCS | Performed by: FAMILY MEDICINE

## 2018-11-15 PROCEDURE — 1101F PT FALLS ASSESS-DOCD LE1/YR: CPT | Performed by: FAMILY MEDICINE

## 2018-11-15 PROCEDURE — 1123F ACP DISCUSS/DSCN MKR DOCD: CPT | Performed by: FAMILY MEDICINE

## 2018-11-15 NOTE — PROGRESS NOTES
Candida esophagitis (HCC)     Sepsis due to urinary tract infection (HCC)     Chronic respiratory failure (HCC)     Chronic diastolic congestive heart failure (HCC)     History of ESBL E. coli infection     Stage 4 chronic kidney disease (HCC)     Fever     Macrocytic anemia     Past Medical History:   Diagnosis Date    Acute on chronic diastolic congestive heart failure ([facility] [address]     Anesthesia complication     DIFFICULTY WAKING OP    Asthma     Atrial fibrillation ([facility] [address] 2013    Cataracts, bilateral     Cellulitis     BILAT LOWER LEGS-PT STATES IS IMPROVING    Cerebral artery occlusion with cerebral infarction [facility])     Last stroke was February 2017 w/no deficits-TOTAL OF 3    Chronic kidney disease 2013    NOT ON DIALYSIS YET    Constipation     CHRONIC    COPD (chronic obstructive pulmonary disease) ([facility] [address] 2008    PT. SEES DR. BECK. Home O2 at 2-3L/NC 24/7.     Difficult intravenous access     VEINS ROLL    Diverticulosis     DM2 (diabetes mellitus, type 2) ([facility] [address] 2008    Full dentures     FULL UPPER ONLY    GERD (gastroesophageal reflux disease) 2008    ON RX    Headache(784.0)     Hopi (hard of hearing)     HAS HEARING AIDS BUT DOES NOT WEAR    Hyperlipidemia     Hyperplastic polyp of intestine     Hypertension 2008    Impaired ambulation     USES TRANFER CHAIR WALKER OR CANE    Kidney stone 2018    PRESENTLY-SMALL    Morbid obesity with BMI of 40.0-44.9, adult ([facility] [address] 12/30/2016    ECTOR on CPAP 2008    USES C-PAP NIGHTLY    Osteoarthritis     Parkinson disease ([facility] [address] 2015    Restless leg syndrome 2013    MILD    Spinal stenosis in cervical region 6/2/2013    Type II or unspecified type diabetes mellitus without mention of complication, not stated as uncontrolled     Unspecified sleep apnea     cpap nightly    Urethral caruncle 3/8/2018    Wears glasses       Past Surgical History:   Procedure Laterality Date    APPENDECTOMY      BRONCHOSCOPY  06/05/2018    CERVICAL [address] SURGERY  2012  CERVICAL SPINE SURGERY  5/31/13    posterior [id number]    COLONOSCOPY  2009    COLONOSCOPY  12/29/2016    incomplete was not cleaned out    COLONOSCOPY  12/30/2016    COLONOSCOPY  04/25/2017     SIGMOID COLON POLYPECTOMY:  HYPERPLASTIC POLYP ,   DIVERTICULOSIS    CYSTORRHAPHY  04/04/2018    EYE SURGERY Bilateral 2017    CATARACTS W/ IOL    HERNIA REPAIR  1999    VENTRAL    LAMINECTOMY  05/31/2013    Dr. Belkys Interiano DX W/CELL WASHG [address] [facility] N/A 6/5/2018    BRONCHOSCOPY performed by Marcy Beebe MD at [facility] FLX W/REMOVAL LESION BY HOT BX FORCEPS N/A 4/25/2017    COLONOSCOPY POLYPECTOMY HOT BIOPSY performed by Bethann Scheuermann, MD at [facility] N/A 4/4/2018    CYSTOSCOPY performed by Ludmila Noel MD at [address] [place] [address] ENDOSCOPY  12/28/2016    gastritis, esophagitis,     UPPER GASTROINTESTINAL ENDOSCOPY  08/29/2018    with biopsy    UPPER GASTROINTESTINAL ENDOSCOPY N/A 8/29/2018    EGD BIOPSY performed by Bethann Scheuermann, MD at [address] [place] [address] History   Problem Relation Age of Onset    Heart Failure Mother     Hypertension Mother     Heart Disease Mother     High Blood Pressure Mother        Current Outpatient Prescriptions   Medication Sig Dispense Refill    linagliptin (TRADJENTA) 5 MG tablet Take 0.5 tablets by mouth daily 90 tablet 3    ONETOUCH VERIO strip 1 each by In Vitro route daily As needed. 100 each 3    ONETOUCH DELICA LANCETS 38W MISC 1 Units by Does not apply route 2 times daily 100 each 3    nystatin (MYCOSTATIN) 668916 UNIT/GM powder Apply 3 times daily. 60 g 5    amiodarone (CORDARONE) 200 MG tablet Take 200 mg by mouth daily      metoprolol tartrate (LOPRESSOR) 25 MG tablet Take 12.5 mg by mouth 2 times daily Takes 1/2 tablet BID      ALPRAZolam (XANAX) 0.25 MG tablet Take 0.25 mg by mouth 3 times daily as needed for Anxiety. .      furosemide (LASIX)

## 2018-11-20 ENCOUNTER — HOSPITAL ENCOUNTER (OUTPATIENT)
Dept: INFUSION THERAPY | Facility: MEDICAL CENTER | Age: 73
Discharge: HOME | End: 2018-11-20
Payer: COMMERCIAL

## 2018-11-20 ENCOUNTER — HOSPITAL ENCOUNTER (OUTPATIENT)
Facility: MEDICAL CENTER | Age: 73
Discharge: HOME | End: 2018-11-20
Payer: COMMERCIAL

## 2018-11-20 VITALS
RESPIRATION RATE: 20 BRPM | TEMPERATURE: 97.5 F | DIASTOLIC BLOOD PRESSURE: 48 MMHG | SYSTOLIC BLOOD PRESSURE: 129 MMHG | HEART RATE: 54 BPM

## 2018-11-20 DIAGNOSIS — E61.1 IRON DEFICIENCY: ICD-10-CM

## 2018-11-20 DIAGNOSIS — D63.1 ANEMIA OF CHRONIC RENAL FAILURE, STAGE 4 (SEVERE) (HCC): ICD-10-CM

## 2018-11-20 DIAGNOSIS — K90.89 OTHER SPECIFIED INTESTINAL MALABSORPTION: ICD-10-CM

## 2018-11-20 DIAGNOSIS — N18.4 ANEMIA OF CHRONIC RENAL FAILURE, STAGE 4 (SEVERE) (HCC): ICD-10-CM

## 2018-11-20 DIAGNOSIS — D64.9 ANEMIA, UNSPECIFIED TYPE: ICD-10-CM

## 2018-11-20 LAB
ABSOLUTE EOS #: 0.1 K/UL (ref 0–0.4)
ABSOLUTE IMMATURE GRANULOCYTE: ABNORMAL K/UL (ref 0–0.3)
ABSOLUTE LYMPH #: 1.5 K/UL (ref 1–4.8)
ABSOLUTE MONO #: 0.5 K/UL (ref 0.2–0.8)
BASOPHILS # BLD: 0 % (ref 0–2)
BASOPHILS ABSOLUTE: 0 K/UL (ref 0–0.2)
DIFFERENTIAL TYPE: ABNORMAL
EOSINOPHILS RELATIVE PERCENT: 2 % (ref 1–4)
HCT VFR BLD CALC: 28.9 % (ref 36–46)
HEMOGLOBIN: 9.7 G/DL (ref 12–16)
IMMATURE GRANULOCYTES: ABNORMAL %
LYMPHOCYTES # BLD: 16 % (ref 24–44)
MCH RBC QN AUTO: 32.6 PG (ref 26–34)
MCHC RBC AUTO-ENTMCNC: 33.4 G/DL (ref 31–37)
MCV RBC AUTO: 97.6 FL (ref 80–100)
MONOCYTES # BLD: 6 % (ref 1–7)
NRBC AUTOMATED: ABNORMAL PER 100 WBC
PDW BLD-RTO: 14.4 % (ref 11.5–14.5)
PLATELET # BLD: 148 K/UL (ref 130–400)
PLATELET ESTIMATE: ABNORMAL
PMV BLD AUTO: 8.1 FL (ref 6–12)
RBC # BLD: 2.96 M/UL (ref 4–5.2)
RBC # BLD: ABNORMAL 10*6/UL
SEG NEUTROPHILS: 76 % (ref 36–66)
SEGMENTED NEUTROPHILS ABSOLUTE COUNT: 7 K/UL (ref 1.8–7.7)
WBC # BLD: 9.1 K/UL (ref 3.5–11)
WBC # BLD: ABNORMAL 10*3/UL

## 2018-11-20 PROCEDURE — 36415 COLL VENOUS BLD VENIPUNCTURE: CPT

## 2018-11-20 PROCEDURE — 6360000002 HC RX W HCPCS: Performed by: INTERNAL MEDICINE

## 2018-11-20 PROCEDURE — 96372 THER/PROPH/DIAG INJ SC/IM: CPT

## 2018-11-20 PROCEDURE — 85025 COMPLETE CBC W/AUTO DIFF WBC: CPT

## 2018-11-20 RX ORDER — CYANOCOBALAMIN 1000 UG/ML
1000 INJECTION INTRAMUSCULAR; SUBCUTANEOUS ONCE
Status: CANCELLED
Start: 2018-11-20

## 2018-11-20 RX ORDER — CYANOCOBALAMIN 1000 UG/ML
1000 INJECTION INTRAMUSCULAR; SUBCUTANEOUS ONCE
Status: COMPLETED
Start: 2018-11-20 | End: 2018-11-20

## 2018-11-20 RX ORDER — ACETAMINOPHEN 325 MG/1
325 TABLET ORAL
Status: CANCELLED | OUTPATIENT
Start: 2018-11-20

## 2018-11-20 RX ADMIN — DARBEPOETIN ALFA 100 MCG: 100 INJECTION, SOLUTION INTRAVENOUS; SUBCUTANEOUS at 13:45

## 2018-11-20 RX ADMIN — CYANOCOBALAMIN 1000 MCG: 1000 INJECTION, SOLUTION INTRAMUSCULAR at 13:45

## 2018-11-20 NOTE — PROGRESS NOTES
Patient arrive via wheelchair with  for Aranesp and B12 injections. Vitals as charted. Denies any sign of infection at this time. B12 injection to right arm tolerated well; band-aid applied to injection site. Aranesp injection to left arm; tolerated well, band-aid applied to site. Patient off unit via wheelchair with ; RTC 12/11/18; calendar provided.

## 2018-11-27 ENCOUNTER — APPOINTMENT (OUTPATIENT)
Dept: CT IMAGING | Age: 73
DRG: 682 | End: 2018-11-27
Payer: COMMERCIAL

## 2018-11-27 ENCOUNTER — HOSPITAL ENCOUNTER (INPATIENT)
Age: 73
LOS: 13 days | Discharge: NURSING, REHAB OR LONG-TERM CARE | DRG: 682 | End: 2018-12-10
Attending: EMERGENCY MEDICINE | Admitting: INTERNAL MEDICINE
Payer: COMMERCIAL

## 2018-11-27 DIAGNOSIS — N17.9 AKI (ACUTE KIDNEY INJURY) (HCC): Primary | ICD-10-CM

## 2018-11-27 PROBLEM — E83.52 HYPERCALCEMIA: Status: ACTIVE | Noted: 2018-11-27

## 2018-11-27 LAB
-: ABNORMAL
-: NORMAL
ABSOLUTE EOS #: 0.08 K/UL (ref 0–0.4)
ABSOLUTE IMMATURE GRANULOCYTE: ABNORMAL K/UL (ref 0–0.3)
ABSOLUTE LYMPH #: 1.22 K/UL (ref 1–4.8)
ABSOLUTE MONO #: 0.53 K/UL (ref 0.2–0.8)
AMORPHOUS: ABNORMAL
ANION GAP SERPL CALCULATED.3IONS-SCNC: 12 MMOL/L (ref 9–17)
BACTERIA: ABNORMAL
BASOPHILS # BLD: 0 %
BASOPHILS ABSOLUTE: 0 K/UL (ref 0–0.2)
BILIRUBIN URINE: NEGATIVE
BUN BLDV-MCNC: 42 MG/DL (ref 8–23)
BUN/CREAT BLD: 14 (ref 9–20)
CALCIUM SERPL-MCNC: 13.5 MG/DL (ref 8.6–10.4)
CASTS UA: ABNORMAL /LPF
CHLORIDE BLD-SCNC: 96 MMOL/L (ref 98–107)
CO2: 34 MMOL/L (ref 20–31)
COLOR: YELLOW
COMMENT UA: ABNORMAL
CREAT SERPL-MCNC: 3.02 MG/DL (ref 0.5–0.9)
CRYSTALS, UA: ABNORMAL /HPF
DIFFERENTIAL TYPE: ABNORMAL
EOSINOPHILS RELATIVE PERCENT: 1 % (ref 1–4)
EPITHELIAL CELLS UA: ABNORMAL /HPF (ref 0–5)
GFR AFRICAN AMERICAN: 19 ML/MIN
GFR NON-AFRICAN AMERICAN: 15 ML/MIN
GFR SERPL CREATININE-BSD FRML MDRD: ABNORMAL ML/MIN/{1.73_M2}
GFR SERPL CREATININE-BSD FRML MDRD: ABNORMAL ML/MIN/{1.73_M2}
GLUCOSE BLD-MCNC: 105 MG/DL (ref 70–99)
GLUCOSE URINE: NEGATIVE
HCT VFR BLD CALC: 28.4 % (ref 36–46)
HEMOGLOBIN: 9.6 G/DL (ref 12–16)
IMMATURE GRANULOCYTES: ABNORMAL %
KETONES, URINE: NEGATIVE
LACTIC ACID: 0.8 MMOL/L (ref 0.5–2.2)
LEUKOCYTE ESTERASE, URINE: ABNORMAL
LYMPHOCYTES # BLD: 16 % (ref 24–44)
MCH RBC QN AUTO: 32.7 PG (ref 26–34)
MCHC RBC AUTO-ENTMCNC: 33.9 G/DL (ref 31–37)
MCV RBC AUTO: 96.7 FL (ref 80–100)
MONOCYTES # BLD: 7 % (ref 1–7)
MORPHOLOGY: ABNORMAL
MUCUS: ABNORMAL
NITRITE, URINE: POSITIVE
NRBC AUTOMATED: ABNORMAL PER 100 WBC
OTHER OBSERVATIONS UA: ABNORMAL
PDW BLD-RTO: 14.9 % (ref 11.5–14.5)
PH UA: 5.5 (ref 5–8)
PLATELET # BLD: 126 K/UL (ref 130–400)
PLATELET ESTIMATE: ABNORMAL
PMV BLD AUTO: 8.4 FL (ref 6–12)
POTASSIUM SERPL-SCNC: 4 MMOL/L (ref 3.7–5.3)
PROTEIN UA: NEGATIVE
RBC # BLD: 2.94 M/UL (ref 4–5.2)
RBC # BLD: ABNORMAL 10*6/UL
RBC UA: ABNORMAL /HPF (ref 0–2)
REASON FOR REJECTION: NORMAL
RENAL EPITHELIAL, UA: ABNORMAL /HPF
SEG NEUTROPHILS: 76 % (ref 36–66)
SEGMENTED NEUTROPHILS ABSOLUTE COUNT: 5.77 K/UL (ref 1.8–7.7)
SODIUM BLD-SCNC: 142 MMOL/L (ref 135–144)
SPECIFIC GRAVITY UA: 1.01 (ref 1–1.03)
TRICHOMONAS: ABNORMAL
TURBIDITY: ABNORMAL
URINE HGB: ABNORMAL
UROBILINOGEN, URINE: NORMAL
WBC # BLD: 7.6 K/UL (ref 3.5–11)
WBC # BLD: ABNORMAL 10*3/UL
WBC UA: ABNORMAL /HPF (ref 0–5)
YEAST: ABNORMAL
ZZ NTE CLEAN UP: ORDERED TEST: NORMAL
ZZ NTE WITH NAME CLEAN UP: SPECIMEN SOURCE: NORMAL

## 2018-11-27 PROCEDURE — 84156 ASSAY OF PROTEIN URINE: CPT

## 2018-11-27 PROCEDURE — 99284 EMERGENCY DEPT VISIT MOD MDM: CPT

## 2018-11-27 PROCEDURE — 74176 CT ABD & PELVIS W/O CONTRAST: CPT

## 2018-11-27 PROCEDURE — 36415 COLL VENOUS BLD VENIPUNCTURE: CPT

## 2018-11-27 PROCEDURE — 2580000003 HC RX 258: Performed by: FAMILY MEDICINE

## 2018-11-27 PROCEDURE — 51701 INSERT BLADDER CATHETER: CPT

## 2018-11-27 PROCEDURE — 86334 IMMUNOFIX E-PHORESIS SERUM: CPT

## 2018-11-27 PROCEDURE — 6370000000 HC RX 637 (ALT 250 FOR IP): Performed by: FAMILY MEDICINE

## 2018-11-27 PROCEDURE — 85025 COMPLETE CBC W/AUTO DIFF WBC: CPT

## 2018-11-27 PROCEDURE — 6360000002 HC RX W HCPCS: Performed by: FAMILY MEDICINE

## 2018-11-27 PROCEDURE — 2580000003 HC RX 258: Performed by: INTERNAL MEDICINE

## 2018-11-27 PROCEDURE — 83605 ASSAY OF LACTIC ACID: CPT

## 2018-11-27 PROCEDURE — 84300 ASSAY OF URINE SODIUM: CPT

## 2018-11-27 PROCEDURE — 87086 URINE CULTURE/COLONY COUNT: CPT

## 2018-11-27 PROCEDURE — 87186 SC STD MICRODIL/AGAR DIL: CPT

## 2018-11-27 PROCEDURE — 80048 BASIC METABOLIC PNL TOTAL CA: CPT

## 2018-11-27 PROCEDURE — 84165 PROTEIN E-PHORESIS SERUM: CPT

## 2018-11-27 PROCEDURE — 6360000002 HC RX W HCPCS: Performed by: INTERNAL MEDICINE

## 2018-11-27 PROCEDURE — 99223 1ST HOSP IP/OBS HIGH 75: CPT | Performed by: FAMILY MEDICINE

## 2018-11-27 PROCEDURE — 6360000002 HC RX W HCPCS: Performed by: EMERGENCY MEDICINE

## 2018-11-27 PROCEDURE — 2580000003 HC RX 258: Performed by: EMERGENCY MEDICINE

## 2018-11-27 PROCEDURE — 84155 ASSAY OF PROTEIN SERUM: CPT

## 2018-11-27 PROCEDURE — 2580000003 HC RX 258: Performed by: NURSE PRACTITIONER

## 2018-11-27 PROCEDURE — 1200000000 HC SEMI PRIVATE

## 2018-11-27 PROCEDURE — 81001 URINALYSIS AUTO W/SCOPE: CPT

## 2018-11-27 PROCEDURE — 82306 VITAMIN D 25 HYDROXY: CPT

## 2018-11-27 PROCEDURE — 87040 BLOOD CULTURE FOR BACTERIA: CPT

## 2018-11-27 RX ORDER — ASPIRIN 81 MG/1
81 TABLET ORAL DAILY
Status: DISCONTINUED | OUTPATIENT
Start: 2018-11-28 | End: 2018-12-10 | Stop reason: HOSPADM

## 2018-11-27 RX ORDER — DOCUSATE SODIUM 100 MG/1
100 CAPSULE, LIQUID FILLED ORAL 2 TIMES DAILY
Status: DISCONTINUED | OUTPATIENT
Start: 2018-11-27 | End: 2018-12-10 | Stop reason: HOSPADM

## 2018-11-27 RX ORDER — SODIUM CHLORIDE 9 MG/ML
INJECTION, SOLUTION INTRAVENOUS CONTINUOUS
Status: DISCONTINUED | OUTPATIENT
Start: 2018-11-27 | End: 2018-11-28

## 2018-11-27 RX ORDER — ATORVASTATIN CALCIUM 20 MG/1
20 TABLET, FILM COATED ORAL DAILY
Status: DISCONTINUED | OUTPATIENT
Start: 2018-11-28 | End: 2018-12-10 | Stop reason: HOSPADM

## 2018-11-27 RX ORDER — ALBUTEROL SULFATE 1.25 MG/3ML
1 SOLUTION RESPIRATORY (INHALATION) EVERY 6 HOURS PRN
Status: ON HOLD | COMMUNITY
End: 2019-09-11 | Stop reason: HOSPADM

## 2018-11-27 RX ORDER — LANOLIN ALCOHOL/MO/W.PET/CERES
3 CREAM (GRAM) TOPICAL NIGHTLY PRN
Status: DISCONTINUED | OUTPATIENT
Start: 2018-11-28 | End: 2018-12-10 | Stop reason: HOSPADM

## 2018-11-27 RX ORDER — RASAGILINE 0.5 MG/1
1 TABLET ORAL DAILY
Status: DISCONTINUED | OUTPATIENT
Start: 2018-11-28 | End: 2018-12-10 | Stop reason: HOSPADM

## 2018-11-27 RX ORDER — CALCITRIOL 0.25 UG/1
0.25 CAPSULE, LIQUID FILLED ORAL DAILY
Status: DISCONTINUED | OUTPATIENT
Start: 2018-11-28 | End: 2018-11-28

## 2018-11-27 RX ORDER — ONDANSETRON 4 MG/1
4 TABLET, ORALLY DISINTEGRATING ORAL EVERY 6 HOURS PRN
Status: DISCONTINUED | OUTPATIENT
Start: 2018-11-27 | End: 2018-12-10 | Stop reason: HOSPADM

## 2018-11-27 RX ORDER — ROPINIROLE 2 MG/1
2 TABLET, FILM COATED ORAL 3 TIMES DAILY
Status: DISCONTINUED | OUTPATIENT
Start: 2018-11-27 | End: 2018-12-10 | Stop reason: HOSPADM

## 2018-11-27 RX ORDER — SODIUM POLYSTYRENE SULFONATE 15 G/60ML
15 SUSPENSION ORAL; RECTAL
Status: ACTIVE | OUTPATIENT
Start: 2018-11-27 | End: 2018-11-27

## 2018-11-27 RX ORDER — HEPARIN SODIUM 5000 [USP'U]/ML
5000 INJECTION, SOLUTION INTRAVENOUS; SUBCUTANEOUS EVERY 8 HOURS SCHEDULED
Status: DISCONTINUED | OUTPATIENT
Start: 2018-11-27 | End: 2018-12-01

## 2018-11-27 RX ORDER — THIAMINE HCL 100 MG
2500 TABLET ORAL DAILY
Status: ON HOLD | COMMUNITY
End: 2019-05-30 | Stop reason: HOSPADM

## 2018-11-27 RX ORDER — ONDANSETRON 2 MG/ML
4 INJECTION INTRAMUSCULAR; INTRAVENOUS EVERY 6 HOURS PRN
Status: DISCONTINUED | OUTPATIENT
Start: 2018-11-27 | End: 2018-11-27 | Stop reason: CLARIF

## 2018-11-27 RX ORDER — ALPRAZOLAM 0.25 MG/1
0.25 TABLET ORAL 3 TIMES DAILY PRN
Status: DISCONTINUED | OUTPATIENT
Start: 2018-11-27 | End: 2018-12-10 | Stop reason: HOSPADM

## 2018-11-27 RX ORDER — SODIUM POLYSTYRENE SULFONATE 15 G/60ML
30 SUSPENSION ORAL; RECTAL
Status: ACTIVE | OUTPATIENT
Start: 2018-11-27 | End: 2018-11-27

## 2018-11-27 RX ORDER — AMIODARONE HYDROCHLORIDE 200 MG/1
200 TABLET ORAL DAILY
Status: DISCONTINUED | OUTPATIENT
Start: 2018-11-28 | End: 2018-12-10 | Stop reason: HOSPADM

## 2018-11-27 RX ORDER — SODIUM CHLORIDE 0.9 % (FLUSH) 0.9 %
10 SYRINGE (ML) INJECTION EVERY 12 HOURS SCHEDULED
Status: DISCONTINUED | OUTPATIENT
Start: 2018-11-27 | End: 2018-12-10 | Stop reason: HOSPADM

## 2018-11-27 RX ORDER — ONDANSETRON 2 MG/ML
4 INJECTION INTRAMUSCULAR; INTRAVENOUS EVERY 6 HOURS PRN
Status: DISCONTINUED | OUTPATIENT
Start: 2018-11-27 | End: 2018-12-10 | Stop reason: HOSPADM

## 2018-11-27 RX ORDER — ACETAMINOPHEN 325 MG/1
650 TABLET ORAL EVERY 4 HOURS PRN
Status: DISCONTINUED | OUTPATIENT
Start: 2018-11-27 | End: 2018-11-29

## 2018-11-27 RX ORDER — CARBIDOPA AND LEVODOPA 25; 100 MG/1; MG/1
1 TABLET, EXTENDED RELEASE ORAL 4 TIMES DAILY
Status: DISCONTINUED | OUTPATIENT
Start: 2018-11-27 | End: 2018-12-10 | Stop reason: HOSPADM

## 2018-11-27 RX ORDER — 0.9 % SODIUM CHLORIDE 0.9 %
500 INTRAVENOUS SOLUTION INTRAVENOUS ONCE
Status: COMPLETED | OUTPATIENT
Start: 2018-11-27 | End: 2018-11-27

## 2018-11-27 RX ORDER — SODIUM CHLORIDE 0.9 % (FLUSH) 0.9 %
10 SYRINGE (ML) INJECTION PRN
Status: DISCONTINUED | OUTPATIENT
Start: 2018-11-27 | End: 2018-12-10 | Stop reason: HOSPADM

## 2018-11-27 RX ORDER — IPRATROPIUM BROMIDE AND ALBUTEROL SULFATE 2.5; .5 MG/3ML; MG/3ML
3 SOLUTION RESPIRATORY (INHALATION) 3 TIMES DAILY
Status: DISCONTINUED | OUTPATIENT
Start: 2018-11-27 | End: 2018-11-30

## 2018-11-27 RX ORDER — NICOTINE 21 MG/24HR
1 PATCH, TRANSDERMAL 24 HOURS TRANSDERMAL DAILY PRN
Status: DISCONTINUED | OUTPATIENT
Start: 2018-11-27 | End: 2018-12-10 | Stop reason: HOSPADM

## 2018-11-27 RX ORDER — CYANOCOBALAMIN 1000 UG/ML
1000 INJECTION INTRAMUSCULAR; SUBCUTANEOUS SEE ADMIN INSTRUCTIONS
COMMUNITY
End: 2019-07-03 | Stop reason: ALTCHOICE

## 2018-11-27 RX ADMIN — ALPRAZOLAM 0.25 MG: 0.25 TABLET ORAL at 22:17

## 2018-11-27 RX ADMIN — METOPROLOL TARTRATE 12.5 MG: 25 TABLET ORAL at 22:17

## 2018-11-27 RX ADMIN — ACETAMINOPHEN 650 MG: 325 TABLET ORAL at 15:56

## 2018-11-27 RX ADMIN — CARBIDOPA AND LEVODOPA 1 TABLET: 25; 100 TABLET, EXTENDED RELEASE ORAL at 22:17

## 2018-11-27 RX ADMIN — ROPINIROLE HYDROCHLORIDE 2 MG: 2 TABLET, FILM COATED ORAL at 22:17

## 2018-11-27 RX ADMIN — HEPARIN SODIUM 5000 UNITS: 5000 INJECTION INTRAVENOUS; SUBCUTANEOUS at 15:49

## 2018-11-27 RX ADMIN — DOCUSATE SODIUM 100 MG: 100 CAPSULE, LIQUID FILLED ORAL at 21:15

## 2018-11-27 RX ADMIN — MEROPENEM 500 MG: 500 INJECTION, POWDER, FOR SOLUTION INTRAVENOUS at 22:18

## 2018-11-27 RX ADMIN — MEROPENEM 500 MG: 500 INJECTION, POWDER, FOR SOLUTION INTRAVENOUS at 11:35

## 2018-11-27 RX ADMIN — SODIUM CHLORIDE: 9 INJECTION, SOLUTION INTRAVENOUS at 14:56

## 2018-11-27 RX ADMIN — HEPARIN SODIUM 5000 UNITS: 5000 INJECTION INTRAVENOUS; SUBCUTANEOUS at 21:15

## 2018-11-27 RX ADMIN — SODIUM CHLORIDE 500 ML: 9 INJECTION, SOLUTION INTRAVENOUS at 10:44

## 2018-11-27 RX ADMIN — ACETAMINOPHEN 650 MG: 325 TABLET ORAL at 21:15

## 2018-11-27 ASSESSMENT — ENCOUNTER SYMPTOMS
SORE THROAT: 0
RHINORRHEA: 0
COUGH: 0
VOMITING: 0
SHORTNESS OF BREATH: 0
DIARRHEA: 0
ABDOMINAL PAIN: 1
WHEEZING: 0
COLOR CHANGE: 0
SINUS PRESSURE: 0
NAUSEA: 0
CONSTIPATION: 0

## 2018-11-27 ASSESSMENT — PAIN DESCRIPTION - LOCATION: LOCATION: ABDOMEN;FLANK

## 2018-11-27 ASSESSMENT — PAIN SCALES - GENERAL
PAINLEVEL_OUTOF10: 9
PAINLEVEL_OUTOF10: 8
PAINLEVEL_OUTOF10: 3

## 2018-11-27 ASSESSMENT — PAIN DESCRIPTION - ONSET: ONSET: ON-GOING

## 2018-11-27 ASSESSMENT — PAIN DESCRIPTION - PROGRESSION: CLINICAL_PROGRESSION: GRADUALLY WORSENING

## 2018-11-27 ASSESSMENT — PAIN DESCRIPTION - ORIENTATION: ORIENTATION: RIGHT;LEFT;MID

## 2018-11-27 ASSESSMENT — PAIN DESCRIPTION - FREQUENCY: FREQUENCY: CONTINUOUS

## 2018-11-27 ASSESSMENT — PAIN DESCRIPTION - DESCRIPTORS: DESCRIPTORS: ACHING

## 2018-11-27 ASSESSMENT — PAIN DESCRIPTION - PAIN TYPE: TYPE: ACUTE PAIN

## 2018-11-27 NOTE — ED PROVIDER NOTES
[address] [place] [address] ED  eMERGENCY dEPARTMENT eNCOUnter      Pt Name: Radha Gramajo  MRN: [id number]  Hagfyamileth 1945  Date of evaluation: 11/27/2018  Provider: [address] [place] [address] NP, ANSHUL Coelho 6195       Chief Complaint   Patient presents with    Abdominal Pain     lower    Flank Pain     bilat    Dysuria     few days         HISTORY OF PRESENT ILLNESS  (Location/Symptom, Timing/Onset, Context/Setting, Quality, Duration, Modifying Factors, Severity.)   Radha Gramajo is a 72 y.o. female who presents to the emergency department Today by private vehicle for evaluation of generalized weakness and burning. The family states last couple days the patient has had progressive weakness. Normally she can ambulate around the house with a walker or cane but for the last couple days she has not been able to even do that. She had also told her  today that she was having some burning when she urinated for the last couple of days. She also has some mild low back and abdominal discomfort. She does have history of UTIs in the past.  She recently had to be admitted to the hospital and go home on IV antibiotics for 12 days. Had infectious disease involved because she had a resistant strain of bacteria. Nursing Notes were reviewed. ALLERGIES     Dye [barium-containing compounds]; Pcn [penicillins]; Bactrim [sulfamethoxazole-trimethoprim]; and Red dye    CURRENT MEDICATIONS       Previous Medications    ALBUTEROL (ACCUNEB) 1.25 MG/3ML NEBULIZER SOLUTION    Inhale 1 ampule into the lungs every 6 hours as needed for Wheezing or Shortness of Breath    ALPRAZOLAM (XANAX) 0.25 MG TABLET    Take 0.25 mg by mouth 3 times daily as needed for Anxiety. .    AMIODARONE (CORDARONE) 200 MG TABLET    Take 200 mg by mouth daily     ASPIRIN 81 MG TABLET    Take 81 mg by mouth daily     ATORVASTATIN (LIPITOR) 20 MG TABLET    TAKE 1 TABLET DAILY    BREO ELLIPTA 100-25 MCG/INH AEPB INHALER    Inhale 1 puff into the lungs  HYPERPLASTIC POLYP ,   DIVERTICULOSIS    CYSTORRHAPHY  2018    EYE SURGERY Bilateral 2017    CATARACTS W/ IOL    HERNIA REPAIR  1999    VENTRAL    LAMINECTOMY  2013    Dr. Will Morin DX W/CELL WASHG 100 [facility] N/A 2018    BRONCHOSCOPY performed by Sami Rosenthal MD at [address] N/A 2017    COLONOSCOPY POLYPECTOMY HOT BIOPSY performed by Nicholes Boas, MD at . [address] N/A 2018    CYSTOSCOPY performed by Magalie Louis MD at [address] [facility] ENDOSCOPY  2016    gastritis, esophagitis,     UPPER GASTROINTESTINAL ENDOSCOPY  2018    with biopsy    UPPER GASTROINTESTINAL ENDOSCOPY N/A 2018    EGD BIOPSY performed by Nicholes Boas, MD at [place] [address]       Family History   Problem Relation Age of Onset    Heart Failure Mother     Hypertension Mother     Heart Disease Mother     High Blood Pressure Mother      Family Status   Relation Status    Mother     Father     MGM     MGF     [facility]     PGF         SOCIAL HISTORY      reports that she quit smoking about 48 years ago. Her smoking use included Cigarettes. She has a 30.00 pack-year smoking history. She has never used smokeless tobacco. She reports that she drinks about 1.2 oz of alcohol per week . She reports that she does not use drugs. REVIEW OF SYSTEMS    (2-9 systems for level 4, 10 or more for level 5)     Review of Systems   Constitutional: Negative for chills, fever and unexpected weight change. HENT: Negative for congestion, rhinorrhea, sinus pressure and sore throat. Respiratory: Negative for cough, shortness of breath and wheezing. Cardiovascular: Negative for chest pain and palpitations. Gastrointestinal: Positive for abdominal pain.  Negative for constipation, diarrhea, nausea and vomiting. Genitourinary: Negative for dysuria and hematuria. Musculoskeletal: Negative for arthralgias and myalgias. Skin: Negative for color change and rash. Neurological: Negative for dizziness, weakness and headaches. Hematological: Negative for adenopathy. Except as noted above the remainder of the review of systems was reviewed and negative. PHYSICAL EXAM    (up to 7 for level 4, 8 or more for level 5)     ED Triage Vitals [11/27/18 0931]   BP Temp Temp Source Pulse Resp SpO2 Height Weight   (!) 166/57 98 °F (36.7 °C) Oral 59 16 100 % 5' 1\" (1.549 m) 180 lb (81.6 kg)       Physical Exam   Constitutional: She is oriented to person, place, and time. She appears well-developed and well-nourished. HENT:   Head: Normocephalic and atraumatic. Mouth/Throat: Oropharynx is clear and moist.   Eyes: Pupils are equal, round, and reactive to light. Conjunctivae are normal.   Neck: Normal range of motion. Neck supple. Cardiovascular: Normal rate and regular rhythm. Pulmonary/Chest: Effort normal and breath sounds normal. No stridor. No respiratory distress. Abdominal: Soft. Bowel sounds are normal. There is tenderness. Musculoskeletal: Normal range of motion. Lymphadenopathy:     She has no cervical adenopathy. Neurological: She is alert and oriented to person, place, and time. Skin: Skin is warm and dry. No rash noted. Psychiatric: She has a normal mood and affect. Vitals reviewed.         LABS:  Labs Reviewed   BASIC METABOLIC PANEL - Abnormal; Notable for the following:        Result Value    Glucose 105 (*)     BUN 42 (*)     CREATININE 3.02 (*)     Calcium 13.5 (*)     Chloride 96 (*)     CO2 34 (*)     GFR Non- 15 (*)     GFR  19 (*)     All other components within normal limits   URINE RT REFLEX TO CULTURE - Abnormal; Notable for the following:     Turbidity UA CLOUDY (*)     Urine Hgb 3+ (*)     Nitrite, Urine POSITIVE (*)

## 2018-11-27 NOTE — PROGRESS NOTES
Transitions of Care Pharmacy Service   Medication Review    The patient's list of current home medications has been reviewed and updated. Source(s) of information: Family, Surescripts, facility list    Please feel free to call with any questions about this encounter. Thank you. Jovany Sidhu PharmD  Transitions of Care Pharmacy Service  Phone:  [phone]  Fax: [phone]             Prior to Admission medications    Medication Sig Start Date End Date Taking? Authorizing Provider   cyanocobalamin 1000 MCG/ML injection Inject 1,000 mcg into the muscle See Admin Instructions Every 3 weeks   Yes Historical Provider, MD   Cyanocobalamin (VITAMIN B-12) 2500 MCG SUBL Place 2,500 mcg under the tongue daily   Yes Historical Provider, MD   conjugated estrogens (PREMARIN) 0.625 MG/GM vaginal cream Place 0.5 g vaginally daily as needed Place vaginally daily. Yes Historical Provider, MD   albuterol (ACCUNEB) 1.25 MG/3ML nebulizer solution Inhale 1 ampule into the lungs every 6 hours as needed for Wheezing or Shortness of Breath   Yes Historical Provider, MD   linagliptin (TRADJENTA) 5 MG tablet Take 0.5 tablets by mouth daily 11/15/18  Yes Lesly Hill,    nystatin (MYCOSTATIN) 241349 UNIT/GM powder Apply 3 times daily. 10/19/18  Yes Lesly Hill DO   amiodarone (CORDARONE) 200 MG tablet Take 200 mg by mouth daily    Yes Historical Provider, MD   metoprolol tartrate (LOPRESSOR) 25 MG tablet Take 25 mg by mouth daily Takes 1/2 tablet BID   Yes Historical Provider, MD   ALPRAZolam (XANAX) 0.25 MG tablet Take 0.25 mg by mouth 3 times daily as needed for Anxiety. .   Yes Historical Provider, MD   furosemide (LASIX) 20 MG tablet Take 40 mg by mouth daily    Yes Historical Provider, MD   ipratropium-albuterol (DUONEB) 0.5-2.5 (3) MG/3ML SOLN nebulizer solution Inhale 3 mLs into the lungs three times daily 9/7/18  Yes Godfrey Marcos DO   melatonin 3 MG TABS tablet Take 1 tablet by mouth nightly as needed (sleep) 9/7/18  Yes Godfrey Marcos, DO   magnesium oxide (MAG-OX) 400 (241.3 Mg) MG TABS tablet Take 1 tablet by mouth 2 times daily 9/7/18  Yes Godfrey Marcos, DO   calcitRIOL (ROCALTROL) 0.25 MCG capsule TAKE 1 CAPSULE THREE TIMES A DAY 8/22/18  Yes Lesly Hill DO   atorvastatin (LIPITOR) 20 MG tablet TAKE 1 TABLET DAILY 7/20/18  Yes Lesly Hill DO   carbidopa-levodopa (SINEMET CR)  MG per extended release tablet Take 1 tablet by mouth 4 times daily   Yes Historical Provider, MD   CALCIUM CITRATE PO Take 500 mg by mouth 3 times daily    Yes Historical Provider, MD   senna (SENOKOT) 8.6 MG tablet Take 2 tablets by mouth nightly    Yes Historical Provider, MD   aspirin 81 MG tablet Take 81 mg by mouth daily    Yes Historical Provider, MD   rOPINIRole (REQUIP) 2 MG tablet Take 2 mg by mouth 3 times daily   Yes Historical Provider, MD   pantoprazole (PROTONIX) 40 MG tablet Take 1 tablet by mouth 2 times daily (before meals) 1/22/18  Yes Lesly Hill DO   BREO ELLIPTA 100-25 MCG/INH AEPB inhaler Inhale 1 puff into the lungs daily 1/22/18  Yes Lesly Hill DO   ferrous sulfate 325 (65 Fe) MG EC tablet Take 1 tablet by mouth 2 times daily (with meals) 12/21/17  Yes Jose Perez, DO   vitamin D (CHOLECALCIFEROL) 5000 units CAPS capsule Take 5,000 Units by mouth daily   Yes Historical Provider, MD   rasagiline mesylate 1 MG TABS Take 1 tablet by mouth daily   Yes Historical Provider, MD Murry Penning strip 1 each by In Vitro route daily As needed.  10/23/18   Lesly Hill DO   ONETOUCH DELICA LANCETS 60G MISC 1 Units by Does not apply route 2 times daily 10/23/18   Lesly Hill DO   spironolactone (ALDACTONE) 50 MG tablet Take 50 mg by mouth daily  11/27/18  Historical Provider, MD   SPIRIVA RESPIMAT 2.5 MCG/ACT AERS inhaler Inhale 2 puffs into the lungs daily 9/17/18 10/17/18  Lesly Hill DO   conjugated estrogens (PREMARIN) 0.625 MG/GM vaginal cream Place 0.5 g vaginally every other day 4/4/18

## 2018-11-27 NOTE — H&P
VEINS ROLL    Diverticulosis     DM2 (diabetes mellitus, type 2) ([facility] 2008    Full dentures     FULL UPPER ONLY    GERD (gastroesophageal reflux disease) 2008    ON RX    Headache(784.0)     [place] (hard of hearing)     HAS HEARING AIDS BUT DOES NOT WEAR    Hyperlipidemia     Hyperplastic polyp of intestine     Hypertension 2008    Impaired ambulation     USES TRANFER CHAIR WALKER OR CANE    Kidney stone 2018    PRESENTLY-SMALL    Morbid obesity with BMI of 40.0-44.9, adult ([facility] 12/30/2016    ECTOR on CPAP 2008    USES C-PAP NIGHTLY    Osteoarthritis     Parkinson disease ([facility] 2015    Restless leg syndrome 2013    MILD    Spinal stenosis in cervical region 6/2/2013    Type II or unspecified type diabetes mellitus without mention of complication, not stated as uncontrolled     Unspecified sleep apnea     cpap nightly    Urethral caruncle 3/8/2018    Wears glasses         Past Surgical History:     Past Surgical History:   Procedure Laterality Date    APPENDECTOMY      BRONCHOSCOPY  06/05/2018   [facility] SURGERY  2012    CERVICAL SPINE SURGERY  5/31/13    posterior c5-t1    COLONOSCOPY  2009    COLONOSCOPY  12/29/2016    incomplete was not cleaned out    COLONOSCOPY  12/30/2016    COLONOSCOPY  04/25/2017     SIGMOID COLON POLYPECTOMY:  HYPERPLASTIC POLYP ,   DIVERTICULOSIS    CYSTORRHAPHY  04/04/2018    EYE SURGERY Bilateral 2017    CATARACTS W/ IOL    HERNIA REPAIR  1999    VENTRAL    LAMINECTOMY  05/31/2013    Dr. James Lu DX W/CELL WASHG [facility] N/A 6/5/2018    BRONCHOSCOPY performed by Alba Curtis MD at [address] N/A 4/25/2017    COLONOSCOPY POLYPECTOMY HOT BIOPSY performed by Chapo Vick MD at [facility] 4/4/2018    CYSTOSCOPY performed by Julio Powell MD at [facility] [place], DO   calcitRIOL (ROCALTROL) 0.25 MCG capsule TAKE 1 CAPSULE THREE TIMES A DAY 8/22/18  Yes Lesly Hill DO   atorvastatin (LIPITOR) 20 MG tablet TAKE 1 TABLET DAILY 7/20/18  Yes Lesly Hill DO   carbidopa-levodopa (SINEMET CR)  MG per extended release tablet Take 1 tablet by mouth 4 times daily   Yes Historical Provider, MD   CALCIUM CITRATE PO Take 500 mg by mouth 3 times daily    Yes Historical Provider, MD   senna (SENOKOT) 8.6 MG tablet Take 2 tablets by mouth nightly    Yes Historical Provider, MD   aspirin 81 MG tablet Take 81 mg by mouth daily    Yes Historical Provider, MD   rOPINIRole (REQUIP) 2 MG tablet Take 2 mg by mouth 3 times daily   Yes Historical Provider, MD   pantoprazole (PROTONIX) 40 MG tablet Take 1 tablet by mouth 2 times daily (before meals) 1/22/18  Yes Lesly Hill DO   BREO ELLIPTA 100-25 MCG/INH AEPB inhaler Inhale 1 puff into the lungs daily 1/22/18  Yes Lesly Hill DO   ferrous sulfate 325 (65 Fe) MG EC tablet Take 1 tablet by mouth 2 times daily (with meals) 12/21/17  Yes Jose HO Blood, DO   vitamin D (CHOLECALCIFEROL) 5000 units CAPS capsule Take 5,000 Units by mouth daily   Yes Historical Provider, MD   rasagiline mesylate 1 MG TABS Take 1 tablet by mouth daily   Yes Historical Provider, MD Isabel Meigs strip 1 each by In Vitro route daily As needed. 10/23/18   Lesly Hill DO   ONETOUCH DELICA LANCETS 67A MISC 1 Units by Does not apply route 2 times daily 10/23/18   Lesly Hill DO   SPIRIVA RESPIMAT 2.5 MCG/ACT AERS inhaler Inhale 2 puffs into the lungs daily 9/17/18 10/17/18  Lesly Hill DO   Oxygen Concentrator Dx: Pneumonia, use as directed. Patient taking differently: Dx: Pneumonia, use as directed. ON 24/7 2/10/17   Lesly Hill DO        Allergies:     Dye [barium-containing compounds]; Pcn [penicillins]; and Bactrim [sulfamethoxazole-trimethoprim]    Social History:     Tobacco:    reports that she quit smoking about 48 years ago.  Her Immature Granulocytes NOT REPORTED 0 %    Absolute Immature Granulocyte NOT REPORTED 0.00 - 0.30 k/uL    WBC Morphology NOT REPORTED     RBC Morphology NOT REPORTED     Platelet Estimate NOT REPORTED     Seg Neutrophils 76 (H) 36 - 66 %    Lymphocytes 16 (L) 24 - 44 %    Monocytes 7 1 - 7 %    Eosinophils % 1 1 - 4 %    Basophils 0 %    Segs Absolute 5.77 1.8 - 7.7 k/uL    Absolute Lymph # 1.22 1.0 - 4.8 k/uL    Absolute Mono # 0.53 0.2 - 0.8 k/uL    Absolute Eos # 0.08 0.0 - 0.4 k/uL    Basophils # 0.00 0.0 - 0.2 k/uL    Morphology HYPOCHROMIA PRESENT        Imaging/Diagnostics:        Assessment :      Primary Problem  Acute kidney injury [facility])    Active Hospital Problems    Diagnosis Date Noted    Acute kidney injury ([facility] [N17.9] 11/27/2018     Priority: High    Hypercalcemia [E83.52] 11/27/2018    Anemia of chronic renal failure, stage 4 (severe) [facility] [N18.4, D63.1] 04/25/2018    Acute cystitis without hematuria [N30.00] 12/28/2016       Plan:     Patient status Admit as inpatient in the  Progressive Unit/Step down    Principal Problem:    Acute kidney [facility] ([facility]  Active Problems:    Controlled type 2 diabetes mellitus with stage 3 chronic kidney disease, without long-term current use of insulin (HCC)    ECTOR on CPAP    Acute cystitis with hematuria    Chronic respiratory failure with hypoxia and hypercapnia ([facility])    Anemia of chronic renal failure, stage 4 (severe) ([facility])    Chronic diastolic congestive heart failure ([facility])    History of ESBL E. coli infection    Hypercalcemia  Resolved Problems:    Atrial fibrillation with RVR (HCC)    Acute kidney injury superimposed on chronic kidney disease (HCC)    Gentle Hydration  Abx ( Meropenem)   Check CT abdomen/pelvis WO contrast  DC do  Hypercalcemia work up includint ( PTH, Vit D, SPEP,.. )  Continue supplemental O2  Continue other chronic home meds and inhalers  F/U urine and blood culture  DVT and GI PPx   H/O afib?  Not on AC  CPAP     Consultations:

## 2018-11-28 LAB
ALBUMIN (CALCULATED): 4.1 G/DL (ref 3.2–5.2)
ALBUMIN PERCENT: 68 % (ref 45–65)
ALBUMIN SERPL-MCNC: 3.9 G/DL (ref 3.5–5.2)
ALBUMIN/GLOBULIN RATIO: ABNORMAL (ref 1–2.5)
ALP BLD-CCNC: 42 U/L (ref 35–104)
ALPHA 1 PERCENT: 3 % (ref 3–6)
ALPHA 2 PERCENT: 11 % (ref 6–13)
ALPHA-1-GLOBULIN: 0.2 G/DL (ref 0.1–0.4)
ALPHA-2-GLOBULIN: 0.6 G/DL (ref 0.5–0.9)
ALT SERPL-CCNC: <5 U/L (ref 5–33)
ANION GAP SERPL CALCULATED.3IONS-SCNC: 10 MMOL/L (ref 9–17)
AST SERPL-CCNC: 14 U/L
BETA GLOBULIN: 0.5 G/DL (ref 0.5–1.1)
BETA PERCENT: 9 % (ref 11–19)
BILIRUB SERPL-MCNC: 0.16 MG/DL (ref 0.3–1.2)
BUN BLDV-MCNC: 37 MG/DL (ref 8–23)
BUN/CREAT BLD: 12 (ref 9–20)
CALCIUM SERPL-MCNC: 11.9 MG/DL (ref 8.6–10.4)
CHLORIDE BLD-SCNC: 102 MMOL/L (ref 98–107)
CO2: 34 MMOL/L (ref 20–31)
COMPLEMENT C3: 79 MG/DL (ref 90–180)
COMPLEMENT C4: 24 MG/DL (ref 10–40)
CREAT SERPL-MCNC: 3.01 MG/DL (ref 0.5–0.9)
CULTURE: ABNORMAL
FREE KAPPA/LAMBDA RATIO: 2.24 (ref 0.26–1.65)
GAMMA GLOBULIN %: 10 % (ref 9–20)
GAMMA GLOBULIN: 0.6 G/DL (ref 0.5–1.5)
GFR AFRICAN AMERICAN: 19 ML/MIN
GFR NON-AFRICAN AMERICAN: 15 ML/MIN
GFR SERPL CREATININE-BSD FRML MDRD: ABNORMAL ML/MIN/{1.73_M2}
GFR SERPL CREATININE-BSD FRML MDRD: ABNORMAL ML/MIN/{1.73_M2}
GLUCOSE BLD-MCNC: 92 MG/DL (ref 70–99)
HCT VFR BLD CALC: 28.7 % (ref 36–46)
HEMOGLOBIN: 9.2 G/DL (ref 12–16)
INR BLD: 1
KAPPA FREE LIGHT CHAINS QNT: 3.74 MG/DL (ref 0.37–1.94)
LAMBDA FREE LIGHT CHAINS QNT: 1.67 MG/DL (ref 0.57–2.63)
Lab: ABNORMAL
MAGNESIUM: 2.3 MG/DL (ref 1.6–2.6)
MCH RBC QN AUTO: 31.8 PG (ref 26–34)
MCHC RBC AUTO-ENTMCNC: 32.1 G/DL (ref 31–37)
MCV RBC AUTO: 98.9 FL (ref 80–100)
NRBC AUTOMATED: ABNORMAL PER 100 WBC
ORGANISM: ABNORMAL
PATHOLOGIST: ABNORMAL
PATHOLOGIST: NORMAL
PDW BLD-RTO: 14.9 % (ref 11.5–14.5)
PLATELET # BLD: 113 K/UL (ref 130–400)
PMV BLD AUTO: ABNORMAL FL (ref 6–12)
POTASSIUM SERPL-SCNC: 3.4 MMOL/L (ref 3.7–5.3)
PROTEIN ELECTROPHORESIS, SERUM: ABNORMAL
PROTHROMBIN TIME: 10.1 SEC (ref 9.7–11.6)
PTH INTACT: 7.07 PG/ML (ref 15–65)
RBC # BLD: 2.9 M/UL (ref 4–5.2)
SERUM IFX INTERP: NORMAL
SODIUM BLD-SCNC: 146 MMOL/L (ref 135–144)
SPECIMEN DESCRIPTION: ABNORMAL
STATUS: ABNORMAL
TOTAL PROT. SUM,%: 101 % (ref 98–102)
TOTAL PROT. SUM: 6 G/DL (ref 6.3–8.2)
TOTAL PROTEIN: 6 G/DL (ref 6.4–8.3)
TOTAL PROTEIN: 6.4 G/DL (ref 6.4–8.3)
TSH SERPL DL<=0.05 MIU/L-ACNC: 3.13 MIU/L (ref 0.3–5)
VITAMIN D 25-HYDROXY: 55.7 NG/ML (ref 30–100)
WBC # BLD: 8.1 K/UL (ref 3.5–11)

## 2018-11-28 PROCEDURE — 99254 IP/OBS CNSLTJ NEW/EST MOD 60: CPT | Performed by: INTERNAL MEDICINE

## 2018-11-28 PROCEDURE — 81001 URINALYSIS AUTO W/SCOPE: CPT

## 2018-11-28 PROCEDURE — 94664 DEMO&/EVAL PT USE INHALER: CPT

## 2018-11-28 PROCEDURE — 6370000000 HC RX 637 (ALT 250 FOR IP): Performed by: FAMILY MEDICINE

## 2018-11-28 PROCEDURE — 85610 PROTHROMBIN TIME: CPT

## 2018-11-28 PROCEDURE — 80053 COMPREHEN METABOLIC PANEL: CPT

## 2018-11-28 PROCEDURE — 6360000002 HC RX W HCPCS: Performed by: FAMILY MEDICINE

## 2018-11-28 PROCEDURE — 84156 ASSAY OF PROTEIN URINE: CPT

## 2018-11-28 PROCEDURE — 2700000000 HC OXYGEN THERAPY PER DAY

## 2018-11-28 PROCEDURE — 99232 SBSQ HOSP IP/OBS MODERATE 35: CPT | Performed by: FAMILY MEDICINE

## 2018-11-28 PROCEDURE — 6360000002 HC RX W HCPCS: Performed by: INTERNAL MEDICINE

## 2018-11-28 PROCEDURE — 86160 COMPLEMENT ANTIGEN: CPT

## 2018-11-28 PROCEDURE — 84443 ASSAY THYROID STIM HORMONE: CPT

## 2018-11-28 PROCEDURE — 83970 ASSAY OF PARATHORMONE: CPT

## 2018-11-28 PROCEDURE — 85027 COMPLETE CBC AUTOMATED: CPT

## 2018-11-28 PROCEDURE — 6370000000 HC RX 637 (ALT 250 FOR IP): Performed by: INTERNAL MEDICINE

## 2018-11-28 PROCEDURE — 2580000003 HC RX 258: Performed by: FAMILY MEDICINE

## 2018-11-28 PROCEDURE — 84133 ASSAY OF URINE POTASSIUM: CPT

## 2018-11-28 PROCEDURE — 94760 N-INVAS EAR/PLS OXIMETRY 1: CPT

## 2018-11-28 PROCEDURE — 83735 ASSAY OF MAGNESIUM: CPT

## 2018-11-28 PROCEDURE — 1200000000 HC SEMI PRIVATE

## 2018-11-28 PROCEDURE — 2580000003 HC RX 258: Performed by: INTERNAL MEDICINE

## 2018-11-28 PROCEDURE — 82436 ASSAY OF URINE CHLORIDE: CPT

## 2018-11-28 PROCEDURE — 94640 AIRWAY INHALATION TREATMENT: CPT

## 2018-11-28 PROCEDURE — 83883 ASSAY NEPHELOMETRY NOT SPEC: CPT

## 2018-11-28 PROCEDURE — 36415 COLL VENOUS BLD VENIPUNCTURE: CPT

## 2018-11-28 RX ORDER — BUTALBITAL, ACETAMINOPHEN AND CAFFEINE 50; 325; 40 MG/1; MG/1; MG/1
1 TABLET ORAL ONCE
Status: COMPLETED | OUTPATIENT
Start: 2018-11-28 | End: 2018-11-28

## 2018-11-28 RX ORDER — TIZANIDINE 4 MG/1
4 TABLET ORAL ONCE
Status: COMPLETED | OUTPATIENT
Start: 2018-11-28 | End: 2018-11-28

## 2018-11-28 RX ORDER — LACTULOSE 10 G/15ML
20 SOLUTION ORAL 3 TIMES DAILY
Status: DISCONTINUED | OUTPATIENT
Start: 2018-11-28 | End: 2018-12-01

## 2018-11-28 RX ORDER — POTASSIUM CHLORIDE 20MEQ/15ML
20 LIQUID (ML) ORAL DAILY
Status: DISCONTINUED | OUTPATIENT
Start: 2018-11-28 | End: 2018-12-10 | Stop reason: HOSPADM

## 2018-11-28 RX ORDER — POLYETHYLENE GLYCOL 3350 17 G/17G
17 POWDER, FOR SOLUTION ORAL 2 TIMES DAILY
Status: DISCONTINUED | OUTPATIENT
Start: 2018-11-28 | End: 2018-12-01

## 2018-11-28 RX ORDER — SODIUM CHLORIDE 450 MG/100ML
INJECTION, SOLUTION INTRAVENOUS CONTINUOUS
Status: DISCONTINUED | OUTPATIENT
Start: 2018-11-28 | End: 2018-11-29

## 2018-11-28 RX ADMIN — METOPROLOL TARTRATE 12.5 MG: 25 TABLET ORAL at 07:59

## 2018-11-28 RX ADMIN — HEPARIN SODIUM 5000 UNITS: 5000 INJECTION INTRAVENOUS; SUBCUTANEOUS at 13:24

## 2018-11-28 RX ADMIN — ACETAMINOPHEN 650 MG: 325 TABLET ORAL at 07:49

## 2018-11-28 RX ADMIN — Medication 2 PUFF: at 20:01

## 2018-11-28 RX ADMIN — CARBIDOPA AND LEVODOPA 1 TABLET: 25; 100 TABLET, EXTENDED RELEASE ORAL at 08:01

## 2018-11-28 RX ADMIN — SODIUM CHLORIDE: 9 INJECTION, SOLUTION INTRAVENOUS at 03:48

## 2018-11-28 RX ADMIN — Medication 2 PUFF: at 08:08

## 2018-11-28 RX ADMIN — CARBIDOPA AND LEVODOPA 1 TABLET: 25; 100 TABLET, EXTENDED RELEASE ORAL at 13:24

## 2018-11-28 RX ADMIN — POLYETHYLENE GLYCOL 3350 17 G: 17 POWDER, FOR SOLUTION ORAL at 21:12

## 2018-11-28 RX ADMIN — LINAGLIPTIN 2.5 MG: 5 TABLET, FILM COATED ORAL at 07:58

## 2018-11-28 RX ADMIN — ROPINIROLE HYDROCHLORIDE 2 MG: 2 TABLET, FILM COATED ORAL at 21:08

## 2018-11-28 RX ADMIN — POLYETHYLENE GLYCOL 3350 17 G: 17 POWDER, FOR SOLUTION ORAL at 08:15

## 2018-11-28 RX ADMIN — TIZANIDINE 4 MG: 4 TABLET ORAL at 10:08

## 2018-11-28 RX ADMIN — DOCUSATE SODIUM 100 MG: 100 CAPSULE, LIQUID FILLED ORAL at 21:07

## 2018-11-28 RX ADMIN — HEPARIN SODIUM 5000 UNITS: 5000 INJECTION INTRAVENOUS; SUBCUTANEOUS at 06:00

## 2018-11-28 RX ADMIN — MEROPENEM 500 MG: 500 INJECTION, POWDER, FOR SOLUTION INTRAVENOUS at 23:12

## 2018-11-28 RX ADMIN — ONDANSETRON 4 MG: 2 INJECTION, SOLUTION INTRAMUSCULAR; INTRAVENOUS at 10:13

## 2018-11-28 RX ADMIN — RASAGILINE 1 MG: 0.5 TABLET ORAL at 08:01

## 2018-11-28 RX ADMIN — POTASSIUM CHLORIDE 20 MEQ: 40 SOLUTION ORAL at 21:08

## 2018-11-28 RX ADMIN — MEROPENEM 500 MG: 500 INJECTION, POWDER, FOR SOLUTION INTRAVENOUS at 10:14

## 2018-11-28 RX ADMIN — SODIUM CHLORIDE: 4.5 INJECTION, SOLUTION INTRAVENOUS at 21:00

## 2018-11-28 RX ADMIN — ROPINIROLE HYDROCHLORIDE 2 MG: 2 TABLET, FILM COATED ORAL at 07:57

## 2018-11-28 RX ADMIN — IPRATROPIUM BROMIDE AND ALBUTEROL SULFATE 3 ML: .5; 3 SOLUTION RESPIRATORY (INHALATION) at 14:58

## 2018-11-28 RX ADMIN — IPRATROPIUM BROMIDE AND ALBUTEROL SULFATE 3 ML: .5; 3 SOLUTION RESPIRATORY (INHALATION) at 08:08

## 2018-11-28 RX ADMIN — METOPROLOL TARTRATE 12.5 MG: 25 TABLET ORAL at 21:07

## 2018-11-28 RX ADMIN — ASPIRIN 81 MG: 81 TABLET, COATED ORAL at 07:50

## 2018-11-28 RX ADMIN — AMIODARONE HYDROCHLORIDE 200 MG: 200 TABLET ORAL at 08:01

## 2018-11-28 RX ADMIN — HEPARIN SODIUM 5000 UNITS: 5000 INJECTION INTRAVENOUS; SUBCUTANEOUS at 23:15

## 2018-11-28 RX ADMIN — ACETAMINOPHEN 650 MG: 325 TABLET ORAL at 02:31

## 2018-11-28 RX ADMIN — CARBIDOPA AND LEVODOPA 1 TABLET: 25; 100 TABLET, EXTENDED RELEASE ORAL at 17:49

## 2018-11-28 RX ADMIN — DOCUSATE SODIUM 100 MG: 100 CAPSULE, LIQUID FILLED ORAL at 07:50

## 2018-11-28 RX ADMIN — LACTULOSE 20 G: 20 SOLUTION ORAL at 13:24

## 2018-11-28 RX ADMIN — ATORVASTATIN CALCIUM 20 MG: 20 TABLET, FILM COATED ORAL at 08:00

## 2018-11-28 RX ADMIN — LACTULOSE 20 G: 20 SOLUTION ORAL at 21:08

## 2018-11-28 RX ADMIN — ROPINIROLE HYDROCHLORIDE 2 MG: 2 TABLET, FILM COATED ORAL at 13:24

## 2018-11-28 RX ADMIN — IPRATROPIUM BROMIDE AND ALBUTEROL SULFATE 3 ML: .5; 3 SOLUTION RESPIRATORY (INHALATION) at 20:01

## 2018-11-28 RX ADMIN — BUTALBITAL, ACETAMINOPHEN, AND CAFFEINE 1 TABLET: 50; 325; 40 TABLET ORAL at 10:08

## 2018-11-28 RX ADMIN — CALCITRIOL 0.25 MCG: 0.25 CAPSULE ORAL at 08:00

## 2018-11-28 RX ADMIN — CHOLECALCIFEROL CAP 125 MCG (5000 UNIT) 5000 UNITS: 125 CAP at 08:01

## 2018-11-28 RX ADMIN — CARBIDOPA AND LEVODOPA 1 TABLET: 25; 100 TABLET, EXTENDED RELEASE ORAL at 21:07

## 2018-11-28 RX ADMIN — LACTULOSE 20 G: 20 SOLUTION ORAL at 08:15

## 2018-11-28 ASSESSMENT — PAIN SCALES - GENERAL
PAINLEVEL_OUTOF10: 3
PAINLEVEL_OUTOF10: 3
PAINLEVEL_OUTOF10: 0
PAINLEVEL_OUTOF10: 7

## 2018-11-28 NOTE — PROGRESS NOTES
vomiting  Neurological:  negative for dizziness, ++  headache    Medications: Allergies: Allergies   Allergen Reactions    Dye [Barium-Containing Compounds] Other (See Comments)     Cause Afib per     Pcn [Penicillins] Itching and Swelling    Bactrim [Sulfamethoxazole-Trimethoprim] Other (See Comments)     Allergic Nephritis       Current Meds:   Scheduled Meds:    lactulose  20 g Oral TID    polyethylene glycol  17 g Oral BID    sodium chloride flush  10 mL Intravenous 2 times per day    docusate sodium  100 mg Oral BID    heparin (porcine)  5,000 Units Subcutaneous 3 times per day    meropenem  500 mg Intravenous Q12H    amiodarone  200 mg Oral Daily    aspirin  81 mg Oral Daily    atorvastatin  20 mg Oral Daily    mometasone-formoterol  2 puff Inhalation BID    calcitRIOL  0.25 mcg Oral Daily    carbidopa-levodopa  1 tablet Oral 4x Daily    ipratropium-albuterol  3 mL Inhalation TID    linagliptin  2.5 mg Oral Daily    metoprolol tartrate  12.5 mg Oral BID    rasagiline  1 mg Oral Daily    rOPINIRole  2 mg Oral TID    vitamin D  5,000 Units Oral Daily     Continuous Infusions:    sodium chloride 75 mL/hr at 11/28/18 0348     PRN Meds: sodium chloride flush, nicotine, acetaminophen, ondansetron **OR** ondansetron, ALPRAZolam, melatonin    Data:     Past Medical History:   has a past medical history of Acute on chronic diastolic congestive heart failure ([facility] Anesthesia complication; Asthma; Atrial fibrillation ([facility] Cataracts, bilateral; Cellulitis; Cerebral artery occlusion with cerebral infarction ([facility] Chronic kidney disease; Constipation; COPD (chronic obstructive pulmonary disease) ([facility] Difficult intravenous access; Diverticulosis; DM2 (diabetes mellitus, type 2) ([facility] Full dentures; GERD (gastroesophageal reflux disease); Headache(784.0); [place] (hard of hearing); Hyperlipidemia; Hyperplastic polyp of intestine; Hypertension; Impaired ambulation; Kidney stone;  Morbid Problems:    Controlled type 2 diabetes mellitus with stage 3 chronic kidney disease, without long-term current use of insulin (HCC)    ECTOR on CPAP    Acute cystitis with hematuria    Chronic respiratory failure with hypoxia and hypercapnia (HCC)    Anemia of chronic renal failure, stage 4 (severe) (HCC)    Chronic diastolic congestive heart failure (HCC)    History of ESBL E. coli infection    Hypercalcemia  Resolved Problems:    Atrial fibrillation with RVR (HCC)    Acute kidney injury superimposed on chronic kidney disease (HCC)       Kidney function is not improving despite hydration, will get nephrology involoved  Abx ( Meropenem)   Check CT abdomen/pelvis WO contrast, show stool burden, add bowel regimen  DC'ed do  Hypercalcemia work up pending, ca level is better today  Continue supplemental O2  Continue other chronic home meds and inhalers  F/U urine and blood culture  DVT and GI PPx   H/O afib? Not on [facility]?   CPAP   Add flexeril , fioricit X 1 for HA and mucle pain    Lisa Hernandez MD  11/28/2018  7:39 AM

## 2018-11-29 ENCOUNTER — APPOINTMENT (OUTPATIENT)
Dept: GENERAL RADIOLOGY | Age: 73
DRG: 682 | End: 2018-11-29
Payer: COMMERCIAL

## 2018-11-29 ENCOUNTER — APPOINTMENT (OUTPATIENT)
Dept: ULTRASOUND IMAGING | Age: 73
DRG: 682 | End: 2018-11-29
Payer: COMMERCIAL

## 2018-11-29 PROBLEM — D89.2 PARAPROTEINEMIA: Status: ACTIVE | Noted: 2018-11-29

## 2018-11-29 LAB
ANION GAP SERPL CALCULATED.3IONS-SCNC: 10 MMOL/L (ref 9–17)
BUN BLDV-MCNC: 33 MG/DL (ref 8–23)
BUN/CREAT BLD: 12 (ref 9–20)
CALCIUM SERPL-MCNC: 10.3 MG/DL (ref 8.6–10.4)
CHLORIDE BLD-SCNC: 100 MMOL/L (ref 98–107)
CO2: 31 MMOL/L (ref 20–31)
CREAT SERPL-MCNC: 2.7 MG/DL (ref 0.5–0.9)
EKG ATRIAL RATE: 65 BPM
EKG P AXIS: 70 DEGREES
EKG P-R INTERVAL: 206 MS
EKG Q-T INTERVAL: 402 MS
EKG QRS DURATION: 96 MS
EKG QTC CALCULATION (BAZETT): 418 MS
EKG R AXIS: -6 DEGREES
EKG T AXIS: 56 DEGREES
EKG VENTRICULAR RATE: 65 BPM
GFR AFRICAN AMERICAN: 21 ML/MIN
GFR NON-AFRICAN AMERICAN: 17 ML/MIN
GFR SERPL CREATININE-BSD FRML MDRD: ABNORMAL ML/MIN/{1.73_M2}
GFR SERPL CREATININE-BSD FRML MDRD: ABNORMAL ML/MIN/{1.73_M2}
GLUCOSE BLD-MCNC: 90 MG/DL (ref 70–99)
IGA: 102 MG/DL (ref 70–400)
IGG: 576 MG/DL (ref 700–1600)
IGM: 148 MG/DL (ref 40–230)
POTASSIUM SERPL-SCNC: 3.7 MMOL/L (ref 3.7–5.3)
SODIUM BLD-SCNC: 141 MMOL/L (ref 135–144)

## 2018-11-29 PROCEDURE — 76775 US EXAM ABDO BACK WALL LIM: CPT

## 2018-11-29 PROCEDURE — 77075 RADEX OSSEOUS SURVEY COMPL: CPT

## 2018-11-29 PROCEDURE — 36415 COLL VENOUS BLD VENIPUNCTURE: CPT

## 2018-11-29 PROCEDURE — 2580000003 HC RX 258: Performed by: INTERNAL MEDICINE

## 2018-11-29 PROCEDURE — 99232 SBSQ HOSP IP/OBS MODERATE 35: CPT | Performed by: INTERNAL MEDICINE

## 2018-11-29 PROCEDURE — 82784 ASSAY IGA/IGD/IGG/IGM EACH: CPT

## 2018-11-29 PROCEDURE — 2700000000 HC OXYGEN THERAPY PER DAY

## 2018-11-29 PROCEDURE — 93005 ELECTROCARDIOGRAM TRACING: CPT

## 2018-11-29 PROCEDURE — 80048 BASIC METABOLIC PNL TOTAL CA: CPT

## 2018-11-29 PROCEDURE — 94640 AIRWAY INHALATION TREATMENT: CPT

## 2018-11-29 PROCEDURE — 2580000003 HC RX 258: Performed by: FAMILY MEDICINE

## 2018-11-29 PROCEDURE — 1200000000 HC SEMI PRIVATE

## 2018-11-29 PROCEDURE — 99232 SBSQ HOSP IP/OBS MODERATE 35: CPT | Performed by: FAMILY MEDICINE

## 2018-11-29 PROCEDURE — 6360000002 HC RX W HCPCS: Performed by: INTERNAL MEDICINE

## 2018-11-29 PROCEDURE — 6370000000 HC RX 637 (ALT 250 FOR IP): Performed by: INTERNAL MEDICINE

## 2018-11-29 PROCEDURE — 71045 X-RAY EXAM CHEST 1 VIEW: CPT

## 2018-11-29 PROCEDURE — 6360000002 HC RX W HCPCS: Performed by: FAMILY MEDICINE

## 2018-11-29 PROCEDURE — 99254 IP/OBS CNSLTJ NEW/EST MOD 60: CPT | Performed by: INTERNAL MEDICINE

## 2018-11-29 PROCEDURE — 6370000000 HC RX 637 (ALT 250 FOR IP): Performed by: FAMILY MEDICINE

## 2018-11-29 PROCEDURE — 94760 N-INVAS EAR/PLS OXIMETRY 1: CPT

## 2018-11-29 RX ORDER — HYDROCODONE BITARTRATE AND ACETAMINOPHEN 5; 325 MG/1; MG/1
1 TABLET ORAL EVERY 6 HOURS PRN
Status: DISCONTINUED | OUTPATIENT
Start: 2018-11-29 | End: 2018-12-10 | Stop reason: HOSPADM

## 2018-11-29 RX ORDER — SODIUM CHLORIDE 9 MG/ML
INJECTION, SOLUTION INTRAVENOUS CONTINUOUS
Status: DISCONTINUED | OUTPATIENT
Start: 2018-11-29 | End: 2018-11-29

## 2018-11-29 RX ADMIN — SODIUM CHLORIDE: 9 INJECTION, SOLUTION INTRAVENOUS at 11:38

## 2018-11-29 RX ADMIN — ACETAMINOPHEN 650 MG: 325 TABLET ORAL at 10:11

## 2018-11-29 RX ADMIN — POTASSIUM CHLORIDE 20 MEQ: 40 SOLUTION ORAL at 09:27

## 2018-11-29 RX ADMIN — POLYETHYLENE GLYCOL 3350 17 G: 17 POWDER, FOR SOLUTION ORAL at 20:22

## 2018-11-29 RX ADMIN — CARBIDOPA AND LEVODOPA 1 TABLET: 25; 100 TABLET, EXTENDED RELEASE ORAL at 12:15

## 2018-11-29 RX ADMIN — MEROPENEM 500 MG: 500 INJECTION, POWDER, FOR SOLUTION INTRAVENOUS at 22:45

## 2018-11-29 RX ADMIN — RASAGILINE 1 MG: 0.5 TABLET ORAL at 09:40

## 2018-11-29 RX ADMIN — POLYETHYLENE GLYCOL 3350 17 G: 17 POWDER, FOR SOLUTION ORAL at 09:40

## 2018-11-29 RX ADMIN — CHOLECALCIFEROL CAP 125 MCG (5000 UNIT) 5000 UNITS: 125 CAP at 09:26

## 2018-11-29 RX ADMIN — Medication 2 PUFF: at 19:56

## 2018-11-29 RX ADMIN — HYDROCODONE BITARTRATE AND ACETAMINOPHEN 1 TABLET: 5; 325 TABLET ORAL at 16:01

## 2018-11-29 RX ADMIN — HEPARIN SODIUM 5000 UNITS: 5000 INJECTION INTRAVENOUS; SUBCUTANEOUS at 22:44

## 2018-11-29 RX ADMIN — Medication 10 ML: at 09:28

## 2018-11-29 RX ADMIN — METOPROLOL TARTRATE 12.5 MG: 25 TABLET ORAL at 09:27

## 2018-11-29 RX ADMIN — AMIODARONE HYDROCHLORIDE 200 MG: 200 TABLET ORAL at 09:27

## 2018-11-29 RX ADMIN — METOPROLOL TARTRATE 12.5 MG: 25 TABLET ORAL at 20:41

## 2018-11-29 RX ADMIN — IPRATROPIUM BROMIDE AND ALBUTEROL SULFATE 3 ML: .5; 3 SOLUTION RESPIRATORY (INHALATION) at 09:40

## 2018-11-29 RX ADMIN — LACTULOSE 20 G: 20 SOLUTION ORAL at 09:27

## 2018-11-29 RX ADMIN — DOCUSATE SODIUM 100 MG: 100 CAPSULE, LIQUID FILLED ORAL at 09:28

## 2018-11-29 RX ADMIN — IPRATROPIUM BROMIDE AND ALBUTEROL SULFATE 3 ML: .5; 3 SOLUTION RESPIRATORY (INHALATION) at 19:56

## 2018-11-29 RX ADMIN — LACTULOSE 20 G: 20 SOLUTION ORAL at 20:23

## 2018-11-29 RX ADMIN — ATORVASTATIN CALCIUM 20 MG: 20 TABLET, FILM COATED ORAL at 09:28

## 2018-11-29 RX ADMIN — ROPINIROLE HYDROCHLORIDE 2 MG: 2 TABLET, FILM COATED ORAL at 09:26

## 2018-11-29 RX ADMIN — CARBIDOPA AND LEVODOPA 1 TABLET: 25; 100 TABLET, EXTENDED RELEASE ORAL at 09:28

## 2018-11-29 RX ADMIN — CARBIDOPA AND LEVODOPA 1 TABLET: 25; 100 TABLET, EXTENDED RELEASE ORAL at 20:23

## 2018-11-29 RX ADMIN — MEROPENEM 500 MG: 500 INJECTION, POWDER, FOR SOLUTION INTRAVENOUS at 12:15

## 2018-11-29 RX ADMIN — ASPIRIN 81 MG: 81 TABLET, COATED ORAL at 09:27

## 2018-11-29 RX ADMIN — ACETAMINOPHEN 650 MG: 325 TABLET ORAL at 02:56

## 2018-11-29 RX ADMIN — LINAGLIPTIN 2.5 MG: 5 TABLET, FILM COATED ORAL at 09:27

## 2018-11-29 RX ADMIN — HEPARIN SODIUM 5000 UNITS: 5000 INJECTION INTRAVENOUS; SUBCUTANEOUS at 13:38

## 2018-11-29 RX ADMIN — HEPARIN SODIUM 5000 UNITS: 5000 INJECTION INTRAVENOUS; SUBCUTANEOUS at 06:33

## 2018-11-29 RX ADMIN — ROPINIROLE HYDROCHLORIDE 2 MG: 2 TABLET, FILM COATED ORAL at 20:22

## 2018-11-29 RX ADMIN — ROPINIROLE HYDROCHLORIDE 2 MG: 2 TABLET, FILM COATED ORAL at 13:38

## 2018-11-29 RX ADMIN — CARBIDOPA AND LEVODOPA 1 TABLET: 25; 100 TABLET, EXTENDED RELEASE ORAL at 18:20

## 2018-11-29 RX ADMIN — DOCUSATE SODIUM 100 MG: 100 CAPSULE, LIQUID FILLED ORAL at 20:23

## 2018-11-29 RX ADMIN — ALPRAZOLAM 0.25 MG: 0.25 TABLET ORAL at 10:11

## 2018-11-29 ASSESSMENT — ENCOUNTER SYMPTOMS
RESPIRATORY NEGATIVE: 1
CONSTIPATION: 1

## 2018-11-29 ASSESSMENT — PAIN SCALES - GENERAL
PAINLEVEL_OUTOF10: 9
PAINLEVEL_OUTOF10: 4
PAINLEVEL_OUTOF10: 3
PAINLEVEL_OUTOF10: 5
PAINLEVEL_OUTOF10: 6

## 2018-11-29 NOTE — PROGRESS NOTES
linagliptin  2.5 mg Oral Daily    metoprolol tartrate  12.5 mg Oral BID    rasagiline  1 mg Oral Daily    rOPINIRole  2 mg Oral TID    vitamin D  5,000 Units Oral Daily     Continuous Infusions:    sodium chloride 75 mL/hr at 11/28/18 2100     PRN Meds:  sodium chloride flush, nicotine, acetaminophen, ondansetron **OR** ondansetron, ALPRAZolam, melatonin  Home Meds:                Prescriptions Prior to Admission: cyanocobalamin 1000 MCG/ML injection, Inject 1,000 mcg into the muscle See Admin Instructions Every 3 weeks  Cyanocobalamin (VITAMIN B-12) 2500 MCG SUBL, Place 2,500 mcg under the tongue daily  conjugated estrogens (PREMARIN) 0.625 MG/GM vaginal cream, Place 0.5 g vaginally daily as needed Place vaginally daily. albuterol (ACCUNEB) 1.25 MG/3ML nebulizer solution, Inhale 1 ampule into the lungs every 6 hours as needed for Wheezing or Shortness of Breath  linagliptin (TRADJENTA) 5 MG tablet, Take 0.5 tablets by mouth daily  nystatin (MYCOSTATIN) 013163 UNIT/GM powder, Apply 3 times daily. amiodarone (CORDARONE) 200 MG tablet, Take 200 mg by mouth daily   metoprolol tartrate (LOPRESSOR) 25 MG tablet, Take 25 mg by mouth daily Takes 1/2 tablet BID  ALPRAZolam (XANAX) 0.25 MG tablet, Take 0.25 mg by mouth 3 times daily as needed for Anxiety. .  furosemide (LASIX) 20 MG tablet, Take 40 mg by mouth daily   ipratropium-albuterol (DUONEB) 0.5-2.5 (3) MG/3ML SOLN nebulizer solution, Inhale 3 mLs into the lungs three times daily  melatonin 3 MG TABS tablet, Take 1 tablet by mouth nightly as needed (sleep)  magnesium oxide (MAG-OX) 400 (241.3 Mg) MG TABS tablet, Take 1 tablet by mouth 2 times daily  calcitRIOL (ROCALTROL) 0.25 MCG capsule, TAKE 1 CAPSULE THREE TIMES A DAY  atorvastatin (LIPITOR) 20 MG tablet, TAKE 1 TABLET DAILY  carbidopa-levodopa (SINEMET CR)  MG per extended release tablet, Take 1 tablet by mouth 4 times daily  CALCIUM CITRATE PO, Take 500 mg by mouth 3 times daily   senna (SENOKOT) 8.6 hyperparathyroidism/hypercalcemia 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 Chronic kidney disease stage III B secondary to nephrosclerosis with baseline creatinine 1.8-2. Dr Paulina Stubbs  5. Complicated  Urinary tract infection E. Coli - ESBL  6. Paraproteinemia  7. History of type 2 diabetes  8. History of hypertension  9. History of diastolic congestive heart failure  10. History of atrial fibrillation  11. ECTOR  12. History of nephrolithiasis    PLAN     1. Switch to isotonic fluids  2. Continue to hold Rocaltrol. We will resume his back if calcium continues to be normal  3. Antibiotics as per creatinine clearance  4. Anticipate renal recovery  5.   HemOnco review for paraproteinemia    Please do not hesitate to call with questions    This note is created with the assistance of a speech-recognition program. While intending to generate a document that actually reflects the content of the visit, no guarantees can be provided that every mistake has been identified and corrected by editing    Linn Pedersen MD, MRCP Jackelyn Cohn, [address]   11/29/2018 10:15 AM  [facility]

## 2018-11-29 NOTE — PROGRESS NOTES
[facility]  Progress Note    Bridgette Crockett  MRN: [id number]  Date: 11/29/2018    Reason for F/U :   Urinary tract infection    Impression :   1. Acute cystitis with hematuria  · History of ESBL E. coli urinary tract infection  2. Hypercalcemia  3. Acute kidney injury on chronic kidney disease stage III  4. Toxic metabolic encephalopathy-improved  5. Constipation/fecal impaction  6. Prior CVA    Recommendations:   · Continue intravenous antimicrobial therapy with meropenem  · Again it is not clear to me whether the E. coli in the urine is a colonizer versus a pathogen. · The altered mental status may well be related to the hypercalcemia  · The patient overall is improved today and mentation is much better than yesterday  · She also reports a bowel movement yesterday    Infection Control Recommendations:   Contact precautions    Discharge Planning:   Estimated Length of IV antimicrobials: 5-10 days  Patient will need Midline Catheter Insertion/ PICC line Insertion: No  Patient will need: Home IV , [place],  SNF,  LTAC: Undetermined  Patient willneed outpatient wound care: No    MedicalDecision making / Summary of Stay:   Bridgette Crockett is a 72y.o.-year-old female who was initially admitted on 11/27/2018. Glo Lazar is known to me from her prior hospital stay here and has known ESBL E. coli in the urine, atrial fibrillation, dilated esophagus with a large ulcer at the GE junction, prior CVA, COPD, diabetes mellitus type II, morbid obesity, hyperlipidemia, anemia, thrombocytopenia, chronic kidney disease stage IV among other medical problems. She was treated with meropenem during her last hospital stay. The patient is a poor historian and cannot tell me why she was put into the emergency room. According to the records she has had progressive weakness and normally she is able to ambulate around the house with her walker but has not been able to do so.  There is also reports that she had told her Resistant >=64 RESISTANT Final   cefepime Resistant 4 RESISTANT Final   ciprofloxacin Resistant >=4 RESISTANT Final   ertapenem  NOT REPORTED Final   gentamicin Sensitive <=1 SUSCEPTIBLE Final   meropenem Sensitive <=0.25 SUSCEPTIBLE Final   nitrofurantoin Sensitive <=16 SUSCEPTIBLE Final   piperacillin-tazobactam Resistant <=4 RESISTANT Final   tigecycline  NOT REPORTED Final   tobramycin Sensitive <=1 SUSCEPTIBLE Final   trimethoprim-sulfamethoxazole Resistant >=320 RESISTANT Final           Medications:      lactulose  20 g Oral TID    polyethylene glycol  17 g Oral BID    potassium chloride  20 mEq Oral Daily    sodium chloride flush  10 mL Intravenous 2 times per day    docusate sodium  100 mg Oral BID    heparin (porcine)  5,000 Units Subcutaneous 3 times per day    meropenem  500 mg Intravenous Q12H    amiodarone  200 mg Oral Daily    aspirin  81 mg Oral Daily    atorvastatin  20 mg Oral Daily    mometasone-formoterol  2 puff Inhalation BID    carbidopa-levodopa  1 tablet Oral 4x Daily    ipratropium-albuterol  3 mL Inhalation TID    linagliptin  2.5 mg Oral Daily    metoprolol tartrate  12.5 mg Oral BID    rasagiline  1 mg Oral Daily    rOPINIRole  2 mg Oral TID    vitamin D  5,000 Units Oral Daily     Thank you for allowing us to participate in the care of this patient. Please call with questions. [facility]  Vera Vasquez MD  [employer] messaging  OFFICE: [phone]  CELL:     [phone]    Electronically signed by Vera Vasquez MD on 11/29/2018 at 11:48 AM    This note iscreated with the assistance of a speech recognition program.  While intending to generate a document that actually reflects the content of the visit, the document can still have some errors including those of syntax andsound a like substitutions which may escape proof reading. It such instances, actual meaning can be extrapolated by contextual diversion.

## 2018-11-29 NOTE — CARE COORDINATION
back on with them for all services. Notified Tatiana Mendez on rounds, epic referral in and ROLANDA is in. Will need signed and completed.          Electronically signed by Pao Vega RN on 11/29/18 at 4:07 PM

## 2018-11-29 NOTE — PLAN OF CARE
Problem: Falls - Risk of:  Goal: Will remain free from falls  Will remain free from falls   Outcome: Ongoing  Pt fall risk, fall band present, falling star, safety alarm activated and in use as needed. Hourly rounding performed. Pt encouraged to use call light. See Momo Wise fall risk assessment.

## 2018-11-29 NOTE — FLOWSHEET NOTE
Writer visits per rounding. Patient sleeping. No visitors present. Writer prays for patient and leaves note of support on tray tale. Spiritual Care will follow as needed.        11/29/18 1614   Encounter Summary   Services provided to: Patient   Referral/Consult From: Rounding   Continue Visiting (11/29/18; kwabena)   Complexity of Encounter Low   Length of Encounter 15 minutes   Routine   Type Initial   Assessment Sleeping   Intervention Prayer   Outcome Did not respond

## 2018-11-29 NOTE — PROGRESS NOTES
[facility]    Progress Note    11/29/2018    3:36 PM    Name:   Yady Trammell  MRN:     [id number]     Acct:      [de-identified]   Room:   [address] [place] [address] Day:  2  Admit Date:  11/27/2018  9:30 AM    PCP:   Stef Zuniga DO  Code Status:  Full Code    Subjective:     C/C:   Chief Complaint   Patient presents with    Abdominal Pain     lower    Flank Pain     bilat    Dysuria     few days     Interval History Status: improved. Patient seen and examined at bedside, no acute events overnight. Urine is growing ESBL, ID consulted   Paraproteinemia  Creat improving  Patient feels some chest tightness  Denies  shortness of breath, chills, fevers, nausea or vomiting. Patient vitals, labs and all providers notes were reviewed,from overnight shift and morning updates were noted and discussed with the nurse    Brief History:        The patient is a 72 y.o. Non-/non  female who presents with Abdominal Pain (lower); Flank Pain (bilat); and Dysuria (few days)   and she is admitted to the hospital for the management of  Acute cystitis   Patient with known h/o CHF, DM, Afib, CAD, CVA, COPD, HTN, HPL, PD, ECTOR, RLS as well had a recent admission for urosepsis presented to ER with progressive weakness and fatigue with associated chills and dysuria over the last 3 days, she denies any chest pain, sob, nausea, vomiting, fever or chills     In ER urine was found to have UTI and had some leukocytosis with worsening creatinine and also noted to have hypercalcemia.   She was admitted for furthe management       Review of Systems:     Constitutional:  negative for chills, fevers, sweats  Respiratory:  negative for cough, dyspnea on exertion, shortness of breath, wheezing  Cardiovascular:  negative for chest pain, chest pressure/discomfort, lower extremity edema, palpitations  Gastrointestinal:  negative for abdominal pain, constipation, diarrhea, nausea, renal failure, stage 4 (severe) ([facility] [N18.4, D63.1] 04/25/2018    Chronic respiratory failure with hypoxia and hypercapnia (HCC) [W12.60, J96.12] 12/17/2017    Acute cystitis with hematuria [N30.01] 12/28/2016    ECTOR on CPAP [G47.33, Z99.89]     Controlled type 2 diabetes mellitus with stage 3 chronic kidney disease, without long-term current use of insulin ([facility] [E11.22, N18.3]        Plan:           Principal Problem:    Acute kidney injury ([facility]  Active Problems:    Controlled type 2 diabetes mellitus with stage 3 chronic kidney disease, without long-term current use of insulin (HCC)    ECTOR on CPAP    Acute cystitis with hematuria    Chronic respiratory failure with hypoxia and hypercapnia (HCC)    Anemia of chronic renal failure, stage 4 (severe) (HCC)    Chronic diastolic congestive heart failure (HCC)    History of ESBL E. coli infection    Hypercalcemia  Resolved Problems:    Atrial fibrillation with RVR (HCC)    Acute kidney injury superimposed on chronic kidney disease (HCC)       Creatinine improving , f/u nephrology recommendations    Abx ( Meropenem) , ID currently following    Check CT abdomen/pelvis WO contrast, show stool burden, added bowel regimen, patient had BMalready    DC'ed do    Hypercalcemia resolved currently     Paraproteinemia consult hem/onc    Continue supplemental O2    Continue other chronic home meds and inhalers    Blood culture NGTD    DVT and GI PPx     H/O afib?  Not on [facility]?, no clear reason why, might consider restarting it    Mini Dan MD  11/29/2018  3:36 PM

## 2018-11-30 LAB
ABSOLUTE EOS #: 0.3 K/UL (ref 0–0.4)
ABSOLUTE IMMATURE GRANULOCYTE: ABNORMAL K/UL (ref 0–0.3)
ABSOLUTE LYMPH #: 1.3 K/UL (ref 1–4.8)
ABSOLUTE MONO #: 0.5 K/UL (ref 0.2–0.8)
ANION GAP SERPL CALCULATED.3IONS-SCNC: 11 MMOL/L (ref 9–17)
BASOPHILS # BLD: 1 % (ref 0–2)
BASOPHILS ABSOLUTE: 0.1 K/UL (ref 0–0.2)
BUN BLDV-MCNC: 27 MG/DL (ref 8–23)
BUN/CREAT BLD: 11 (ref 9–20)
CALCIUM SERPL-MCNC: 10 MG/DL (ref 8.6–10.4)
CHLORIDE BLD-SCNC: 100 MMOL/L (ref 98–107)
CO2: 31 MMOL/L (ref 20–31)
CREAT SERPL-MCNC: 2.44 MG/DL (ref 0.5–0.9)
DIFFERENTIAL TYPE: ABNORMAL
EOSINOPHILS RELATIVE PERCENT: 4 % (ref 1–4)
GFR AFRICAN AMERICAN: 24 ML/MIN
GFR NON-AFRICAN AMERICAN: 20 ML/MIN
GFR SERPL CREATININE-BSD FRML MDRD: ABNORMAL ML/MIN/{1.73_M2}
GFR SERPL CREATININE-BSD FRML MDRD: ABNORMAL ML/MIN/{1.73_M2}
GLUCOSE BLD-MCNC: 103 MG/DL (ref 70–99)
HCT VFR BLD CALC: 27.7 % (ref 36–46)
HEMOGLOBIN: 8.8 G/DL (ref 12–16)
IMMATURE GRANULOCYTES: ABNORMAL %
LYMPHOCYTES # BLD: 18 % (ref 24–44)
MCH RBC QN AUTO: 31.3 PG (ref 26–34)
MCHC RBC AUTO-ENTMCNC: 31.6 G/DL (ref 31–37)
MCV RBC AUTO: 99 FL (ref 80–100)
MONOCYTES # BLD: 8 % (ref 1–7)
NRBC AUTOMATED: ABNORMAL PER 100 WBC
PDW BLD-RTO: 15.2 % (ref 11.5–14.5)
PLATELET # BLD: 106 K/UL (ref 130–400)
PLATELET ESTIMATE: ABNORMAL
PMV BLD AUTO: ABNORMAL FL (ref 6–12)
POTASSIUM SERPL-SCNC: 3.9 MMOL/L (ref 3.7–5.3)
RBC # BLD: 2.8 M/UL (ref 4–5.2)
RBC # BLD: ABNORMAL 10*6/UL
SEG NEUTROPHILS: 69 % (ref 36–66)
SEGMENTED NEUTROPHILS ABSOLUTE COUNT: 5 K/UL (ref 1.8–7.7)
SODIUM BLD-SCNC: 142 MMOL/L (ref 135–144)
WBC # BLD: 7.2 K/UL (ref 3.5–11)
WBC # BLD: ABNORMAL 10*3/UL

## 2018-11-30 PROCEDURE — 84156 ASSAY OF PROTEIN URINE: CPT

## 2018-11-30 PROCEDURE — 6370000000 HC RX 637 (ALT 250 FOR IP): Performed by: FAMILY MEDICINE

## 2018-11-30 PROCEDURE — 82570 ASSAY OF URINE CREATININE: CPT

## 2018-11-30 PROCEDURE — 36415 COLL VENOUS BLD VENIPUNCTURE: CPT

## 2018-11-30 PROCEDURE — 97530 THERAPEUTIC ACTIVITIES: CPT

## 2018-11-30 PROCEDURE — 1200000000 HC SEMI PRIVATE

## 2018-11-30 PROCEDURE — 80048 BASIC METABOLIC PNL TOTAL CA: CPT

## 2018-11-30 PROCEDURE — 97535 SELF CARE MNGMENT TRAINING: CPT

## 2018-11-30 PROCEDURE — G8979 MOBILITY GOAL STATUS: HCPCS

## 2018-11-30 PROCEDURE — 99232 SBSQ HOSP IP/OBS MODERATE 35: CPT | Performed by: FAMILY MEDICINE

## 2018-11-30 PROCEDURE — 99231 SBSQ HOSP IP/OBS SF/LOW 25: CPT | Performed by: INTERNAL MEDICINE

## 2018-11-30 PROCEDURE — 2580000003 HC RX 258: Performed by: INTERNAL MEDICINE

## 2018-11-30 PROCEDURE — 6370000000 HC RX 637 (ALT 250 FOR IP): Performed by: NURSE PRACTITIONER

## 2018-11-30 PROCEDURE — 97166 OT EVAL MOD COMPLEX 45 MIN: CPT

## 2018-11-30 PROCEDURE — G8978 MOBILITY CURRENT STATUS: HCPCS

## 2018-11-30 PROCEDURE — G8987 SELF CARE CURRENT STATUS: HCPCS

## 2018-11-30 PROCEDURE — 6370000000 HC RX 637 (ALT 250 FOR IP): Performed by: INTERNAL MEDICINE

## 2018-11-30 PROCEDURE — 2580000003 HC RX 258: Performed by: FAMILY MEDICINE

## 2018-11-30 PROCEDURE — 6360000002 HC RX W HCPCS: Performed by: INTERNAL MEDICINE

## 2018-11-30 PROCEDURE — 83883 ASSAY NEPHELOMETRY NOT SPEC: CPT

## 2018-11-30 PROCEDURE — 81050 URINALYSIS VOLUME MEASURE: CPT

## 2018-11-30 PROCEDURE — 94640 AIRWAY INHALATION TREATMENT: CPT

## 2018-11-30 PROCEDURE — 6360000002 HC RX W HCPCS: Performed by: FAMILY MEDICINE

## 2018-11-30 PROCEDURE — 85025 COMPLETE CBC W/AUTO DIFF WBC: CPT

## 2018-11-30 PROCEDURE — 86335 IMMUNFIX E-PHORSIS/URINE/CSF: CPT

## 2018-11-30 PROCEDURE — 99232 SBSQ HOSP IP/OBS MODERATE 35: CPT | Performed by: INTERNAL MEDICINE

## 2018-11-30 PROCEDURE — G8988 SELF CARE GOAL STATUS: HCPCS

## 2018-11-30 PROCEDURE — 97162 PT EVAL MOD COMPLEX 30 MIN: CPT

## 2018-11-30 RX ORDER — IPRATROPIUM BROMIDE AND ALBUTEROL SULFATE 2.5; .5 MG/3ML; MG/3ML
3 SOLUTION RESPIRATORY (INHALATION) 3 TIMES DAILY
Status: DISCONTINUED | OUTPATIENT
Start: 2018-12-01 | End: 2018-12-10 | Stop reason: HOSPADM

## 2018-11-30 RX ORDER — POLYVINYL ALCOHOL 14 MG/ML
1 SOLUTION/ DROPS OPHTHALMIC PRN
Status: DISCONTINUED | OUTPATIENT
Start: 2018-11-30 | End: 2018-12-10 | Stop reason: HOSPADM

## 2018-11-30 RX ADMIN — HYDROCODONE BITARTRATE AND ACETAMINOPHEN 1 TABLET: 5; 325 TABLET ORAL at 01:20

## 2018-11-30 RX ADMIN — Medication 10 ML: at 09:24

## 2018-11-30 RX ADMIN — POLYVINYL ALCOHOL 1 DROP: 14 SOLUTION/ DROPS OPHTHALMIC at 23:57

## 2018-11-30 RX ADMIN — Medication 10 ML: at 22:10

## 2018-11-30 RX ADMIN — HEPARIN SODIUM 5000 UNITS: 5000 INJECTION INTRAVENOUS; SUBCUTANEOUS at 16:34

## 2018-11-30 RX ADMIN — ATORVASTATIN CALCIUM 20 MG: 20 TABLET, FILM COATED ORAL at 09:21

## 2018-11-30 RX ADMIN — ASPIRIN 81 MG: 81 TABLET, COATED ORAL at 09:21

## 2018-11-30 RX ADMIN — HYDROCODONE BITARTRATE AND ACETAMINOPHEN 1 TABLET: 5; 325 TABLET ORAL at 09:29

## 2018-11-30 RX ADMIN — IPRATROPIUM BROMIDE AND ALBUTEROL SULFATE 3 ML: .5; 3 SOLUTION RESPIRATORY (INHALATION) at 14:47

## 2018-11-30 RX ADMIN — CARBIDOPA AND LEVODOPA 1 TABLET: 25; 100 TABLET, EXTENDED RELEASE ORAL at 16:49

## 2018-11-30 RX ADMIN — LINAGLIPTIN 2.5 MG: 5 TABLET, FILM COATED ORAL at 09:22

## 2018-11-30 RX ADMIN — CARBIDOPA AND LEVODOPA 1 TABLET: 25; 100 TABLET, EXTENDED RELEASE ORAL at 20:17

## 2018-11-30 RX ADMIN — CHOLECALCIFEROL CAP 125 MCG (5000 UNIT) 5000 UNITS: 125 CAP at 09:22

## 2018-11-30 RX ADMIN — ROPINIROLE HYDROCHLORIDE 2 MG: 2 TABLET, FILM COATED ORAL at 16:34

## 2018-11-30 RX ADMIN — METOPROLOL TARTRATE 12.5 MG: 25 TABLET ORAL at 09:22

## 2018-11-30 RX ADMIN — ROPINIROLE HYDROCHLORIDE 2 MG: 2 TABLET, FILM COATED ORAL at 09:21

## 2018-11-30 RX ADMIN — Medication 2 PUFF: at 09:54

## 2018-11-30 RX ADMIN — CARBIDOPA AND LEVODOPA 1 TABLET: 25; 100 TABLET, EXTENDED RELEASE ORAL at 09:21

## 2018-11-30 RX ADMIN — ROPINIROLE HYDROCHLORIDE 2 MG: 2 TABLET, FILM COATED ORAL at 20:17

## 2018-11-30 RX ADMIN — MEROPENEM 500 MG: 500 INJECTION, POWDER, FOR SOLUTION INTRAVENOUS at 12:14

## 2018-11-30 RX ADMIN — Medication 2 PUFF: at 19:54

## 2018-11-30 RX ADMIN — MEROPENEM 500 MG: 500 INJECTION, POWDER, FOR SOLUTION INTRAVENOUS at 23:57

## 2018-11-30 RX ADMIN — RASAGILINE 1 MG: 0.5 TABLET ORAL at 09:29

## 2018-11-30 RX ADMIN — POTASSIUM CHLORIDE 20 MEQ: 40 SOLUTION ORAL at 09:23

## 2018-11-30 RX ADMIN — IPRATROPIUM BROMIDE AND ALBUTEROL SULFATE 3 ML: .5; 3 SOLUTION RESPIRATORY (INHALATION) at 09:51

## 2018-11-30 RX ADMIN — AMIODARONE HYDROCHLORIDE 200 MG: 200 TABLET ORAL at 09:22

## 2018-11-30 RX ADMIN — CARBIDOPA AND LEVODOPA 1 TABLET: 25; 100 TABLET, EXTENDED RELEASE ORAL at 12:14

## 2018-11-30 RX ADMIN — HEPARIN SODIUM 5000 UNITS: 5000 INJECTION INTRAVENOUS; SUBCUTANEOUS at 06:36

## 2018-11-30 RX ADMIN — HYDROCODONE BITARTRATE AND ACETAMINOPHEN 1 TABLET: 5; 325 TABLET ORAL at 20:16

## 2018-11-30 RX ADMIN — IPRATROPIUM BROMIDE AND ALBUTEROL SULFATE 3 ML: .5; 3 SOLUTION RESPIRATORY (INHALATION) at 19:54

## 2018-11-30 ASSESSMENT — PAIN SCALES - GENERAL
PAINLEVEL_OUTOF10: 10
PAINLEVEL_OUTOF10: 6
PAINLEVEL_OUTOF10: 10
PAINLEVEL_OUTOF10: 4

## 2018-11-30 ASSESSMENT — PAIN DESCRIPTION - PROGRESSION: CLINICAL_PROGRESSION: GRADUALLY WORSENING

## 2018-11-30 ASSESSMENT — PAIN DESCRIPTION - FREQUENCY: FREQUENCY: CONTINUOUS

## 2018-11-30 ASSESSMENT — PAIN DESCRIPTION - LOCATION: LOCATION: GENERALIZED

## 2018-11-30 ASSESSMENT — PAIN DESCRIPTION - PAIN TYPE: TYPE: CHRONIC PAIN

## 2018-11-30 ASSESSMENT — ENCOUNTER SYMPTOMS
RESPIRATORY NEGATIVE: 1
GASTROINTESTINAL NEGATIVE: 1

## 2018-11-30 ASSESSMENT — PAIN DESCRIPTION - ONSET: ONSET: UNABLE TO TELL

## 2018-11-30 NOTE — PROGRESS NOTES
Physical Therapy    Facility/Department: [facility] PROGRESSIVE CARE  Initial Assessment    NAME: Tati Garza  : 1945  MRN: [id number]    Date of Service: 2018    Discharge Recommendations:  Subacute/Skilled Nursing Facility   PT Equipment Recommendations  Equipment Needed: No  Per H&P: Tati Garza is a 72 y.o. female who presents to the emergency department Today by private vehicle for evaluation of generalized weakness and burning. The family states last couple days the patient has had progressive weakness. Normally she can ambulate around the house with a walker or cane but for the last couple days she has not been able to even do that. She had also told her  today that she was having some burning when she urinated for the last couple of days. She also has some mild low back and abdominal discomfort. She does have history of UTIs in the past.  She recently had to be admitted to the hospital and go home on IV antibiotics for 12 days. Had infectious disease involved because she had a resistant strain of bacteria. Patient Diagnosis(es): The encounter diagnosis was KRISTIN (acute kidney injury) ([facility] [address] has a past medical history of Acute on chronic diastolic congestive heart failure ([address] Anesthesia complication; Asthma; Atrial fibrillation ([address] Cataracts, bilateral; Cellulitis; Cerebral artery occlusion with cerebral infarction ([address] Chronic kidney disease; Constipation; COPD (chronic obstructive pulmonary disease) ([address] Difficult intravenous access; Diverticulosis; DM2 (diabetes mellitus, type 2) ([address] Full dentures; GERD (gastroesophageal reflux disease); Headache(784.0); [place] (hard of hearing); Hyperlipidemia; Hyperplastic polyp of intestine; Hypertension; Impaired ambulation; Kidney stone; Morbid obesity with BMI of 40.0-44.9, adult ([address] ECTOR on CPAP; Osteoarthritis; Parkinson disease ([address]  Restless leg syndrome; Spinal stenosis in cervical region; Type II or unspecified type 1  Bathroom Shower/Tub: Tub/Shower unit  Bathroom Equipment: Shower chair  Home Equipment: Rolling walker  ADL Assistance: Needs assistance  Homemaking Assistance: Needs assistance  Ambulation Assistance: Needs assistance  Transfer Assistance: Needs assistance  Active : No  Additional Comments: pt is unable to provide details, but per previous therapy notes and case management notes, pt has assist for ADLs/IADLs and is able to walk around house. Pt has h/o falls and repeated hospitalizations/SNF stays/home with Home care  Cognition        Objective          AROM RLE (degrees)  RLE AROM: WFL  AROM LLE (degrees)  LLE AROM : WFL  AROM RUE (degrees)  RUE General AROM: See OT assessment  AROM LUE (degrees)  LUE General AROM: See OT assessment  Strength RLE  Comment: 3+/5  Strength LLE  Comment: 3+/5  Strength RUE  Comment: See OT assessment  Strength LUE  Comment: See OT assessment  Tone RLE  RLE Tone: Normotonic  Tone LLE  LLE Tone: Normotonic     Bed mobility  Rolling to Left: Moderate assistance  Rolling to Right: Moderate assistance  Supine to Sit: Moderate assistance  Transfers  Sit to Stand: Moderate Assistance  Stand to sit: Moderate Assistance  Bed to Chair: Moderate assistance  Stand Pivot Transfers: Moderate Assistance  Lateral Transfers: Moderate Assistance  Ambulation  Ambulation?: Yes  Ambulation 1  Surface: level tile  Device: Rolling Walker  Assistance: Moderate assistance  Quality of Gait: wide NANCY, unsteady, decreased safety judgement  Distance: 4 steps to chair     Balance  Posture: Fair  Sitting - Static: Fair;+  Sitting - Dynamic: Fair;+  Standing - Static: Fair;-  Standing - Dynamic: Fair;-        Assessment   Body structures, Functions, Activity limitations: Decreased functional mobility ; Decreased ADL status; Decreased strength;Decreased safe awareness;Decreased endurance;Decreased balance  Assessment: Patient with decreased safety judgement, not aware of lines and cords with transfers.

## 2018-11-30 NOTE — PROGRESS NOTES
times daily   senna (SENOKOT) 8.6 MG tablet, Take 2 tablets by mouth nightly   aspirin 81 MG tablet, Take 81 mg by mouth daily   rOPINIRole (REQUIP) 2 MG tablet, Take 2 mg by mouth 3 times daily  pantoprazole (PROTONIX) 40 MG tablet, Take 1 tablet by mouth 2 times daily (before meals)  BREO ELLIPTA 100-25 MCG/INH AEPB inhaler, Inhale 1 puff into the lungs daily  ferrous sulfate 325 (65 Fe) MG EC tablet, Take 1 tablet by mouth 2 times daily (with meals)  vitamin D (CHOLECALCIFEROL) 5000 units CAPS capsule, Take 5,000 Units by mouth daily  rasagiline mesylate 1 MG TABS, Take 1 tablet by mouth daily  ONETOUCH VERIO strip, 1 each by In Vitro route daily As needed. ONETOUCH DELICA LANCETS 79G MISC, 1 Units by Does not apply route 2 times daily  SPIRIVA RESPIMAT 2.5 MCG/ACT AERS inhaler, Inhale 2 puffs into the lungs daily  Oxygen Concentrator, Dx: Pneumonia, use as directed. (Patient taking differently: Dx: Pneumonia, use as directed. ON 24/7)    INVESTIGATIONS     Last 3 CMP:    Recent Labs      11/28/18   0717  11/29/18   0644  11/30/18   1102   NA  146*  141  142   K  3.4*  3.7  3.9   CL  102  100  100   CO2  34*  31  31   BUN  37*  33*  27*   CREATININE  3.01*  2.70*  2.44*   CALCIUM  11.9*  10.3  10.0   PROT  6.4   --    --    LABALBU  3.9   --    --    BILITOT  0.16*   --    --    ALKPHOS  42   --    --    AST  14   --    --    ALT  <5*   --    --        Last 3 CBC:  Recent Labs      11/28/18   0717  11/30/18   1102   WBC  8.1  7.2   RBC  2.90*  2.80*   HGB  9.2*  8.8*   HCT  28.7*  27.7*   MCV  98.9  99.0   MCH  31.8  31.3   MCHC  32.1  31.6   RDW  14.9*  15.2*   PLT  113*  106*   MPV  NOT REPORTED  NOT REPORTED       ASSESSMENT     1. Acute kidney injury nonoliguric secondary to ischemic ATIN from UTIs and hypercalcemia aggravated further by diuretic use - improving  2. Hypercalcemia secondary to volume depletion/Rocaltrol use - improving  3.   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 She had this problem since childhood. She used to follow up with a doctor family and the  patient does not know the Specialty, and the doctor has passed away now. 4.  Chronic kidney disease stage III B secondary to nephrosclerosis with baseline creatinine 1.8-2. Dr Malachi Gamez  5. Complicated  Urinary tract infection E. Coli - ESBL  6. Paraproteinemia  7. History of type 2 diabetes  8. History of hypertension  9. History of diastolic congestive heart failure  10. History of atrial fibrillation  11. ECTOR  12. History of nephrolithiasis  13. Hypercalcemia likely secondary to dehydration, exacerbated by usage of Rocaltrol and calcium supplementation at home. PLAN     1. Okay to hold IV fluids since the patient has developed crackles now. 2.  Continue to hold Rocaltrol and calcium supplementation and vitamin-D for now. We will resume his back if calcium continues to be normal.  3.  Antibiotics as per creatinine clearance  4. Anticipate renal recovery  5. HemOnco review for paraproteinemia  6. Encourage better p.o. Intake. 7.  Spoke with the patient and family including  in a great deal about the patient's condition. A great deal of time was spent explaining the overall patient's condition and medical problems. Multiple questions were answered. Later patient's nurse was updated and also patient's primary was updated. 8.  BMP in a.m.  9.  Will follow    Please do not hesitate to call with questions    This note is created with the assistance of a speech-recognition program. While intending to generate a document that actually reflects the content of the visit, no guarantees can be provided that every mistake has been identified and corrected by editing    Dru Negrete MD  [facility]       This note is created with the assistance of a speech-recognition program. While intending to generate a document that actually reflects the content of the visit, no guarantees can be provided that every mistake has been identified and corrected by editing.

## 2018-11-30 NOTE — DISCHARGE INSTR - COC
Continuity of Care Form    Patient Name: Jackie Donis   :  1945  MRN:  [id number]    Admit date:  2018  Discharge date: 12-    Code Status Order: Full Code   Advance Directives:   Advance Care Flowsheet Documentation     Date/Time Healthcare Directive Type of Healthcare Directive Copy in [address] Agent's Name Healthcare Agent's Phone Number    [phone]  Yes, patient has an advance directive for healthcare treatment  Durable power of  for health care;Living will  Yes, copy in chart  --  --  --          Admitting Physician:  Holly Wang MD  PCP: Abhishek Zee DO    Discharging Nurse: Milan Dawson RN  [address] Unit/Room#: 1021/1021-01  Discharging Unit Phone Number: [phone]    Emergency Contact:   Extended Emergency Contact Information  Primary Emergency Contact: Sebas Hurley  Address: [address] [place] [address] Phone: [phone]  Mobile Phone: [phone]  Relation: Spouse  Secondary Emergency Contact: Emerita Mora [address] Phone: [phone]  Relation: Child    Past Surgical History:  Past Surgical History:   Procedure Laterality Date    APPENDECTOMY      BRONCHOSCOPY  2018   [address] SURGERY  2012    CERVICAL SPINE SURGERY  13    posterior [id number]    COLONOSCOPY      COLONOSCOPY  2016    incomplete was not cleaned out    COLONOSCOPY  2016    COLONOSCOPY  2017     SIGMOID COLON POLYPECTOMY:  HYPERPLASTIC POLYP ,   DIVERTICULOSIS    CYSTORRHAPHY  2018    EYE SURGERY Bilateral 2017    CATARACTS W/ IOL    HERNIA REPAIR  1999    VENTRAL    LAMINECTOMY  2013    Dr. Grace Laser    IA [address] INCL [address] DX W/CELL [address] [facility] N/A 2018    BRONCHOSCOPY performed by Riri Avalos MD at [address] N/A 2017    COLONOSCOPY POLYPECTOMY HOT BIOPSY

## 2018-11-30 NOTE — PROGRESS NOTES
[facility]  Progress Note    Kal Lorenzana  MRN: [id number]  Date: 11/30/2018    Reason for F/U :   Urinary tract infection    Impression :   1. Acute cystitis with hematuria  · History of ESBL E. coli urinary tract infection  2. Hypercalcemia  · Workup so far consistent with gammopathy of undetermined significance  3. Acute kidney injury on chronic kidney disease stage III  4. Toxic metabolic encephalopathy-improved  5. Constipation/fecal impaction  6. Prior CVA    Recommendations:   · Continue intravenous antimicrobial therapy with meropenem Day #4/7  · The E. coli in the urine is more likely a colonizer versus a pathogen. · The altered mental status was more likely related to the hypercalcemia and has resolved  · The care was discussed with the patient and the family at bedside. · The plan will be to stop the meropenem at the time of discharge or at 7 days whichever comes 1st.    Infection Control Recommendations:   Contact precautions    Discharge Planning:   Estimated Length of IV antimicrobials: 7 days  Patient will need Midline Catheter Insertion/ PICC line Insertion: No  [facility],  [facility],  LTAC: Undetermined  Patient willneed outpatient wound care: No    MedicalDecision making / Summary of Stay:   Kal Lorenzana is a 72y.o.-year-old female who was initially admitted on 11/27/2018. Rico Gee is known to me from her prior hospital stay here and has known ESBL E. coli in the urine, atrial fibrillation, dilated esophagus with a large ulcer at the GE junction, prior CVA, COPD, diabetes mellitus type II, morbid obesity, hyperlipidemia, anemia, thrombocytopenia, chronic kidney disease stage IV among other medical problems. She was treated with meropenem during her last hospital stay. The patient is a poor historian and cannot tell me why she was put into the emergency room.  According to the records she has had progressive weakness and normally she is able to ambulate around the house with her walker but has not been able to do so. There is also reports that she had told her  she was having burning while urinating and she does have a history of UTIs in the past.  Lab testing did show some hypocalcemia, acute kidney injury and she was admitted for a UTI started on meropenem and I was asked to evaluate and help with antibiotic choice    Currentevaluation:2018    BP (!) 138/91   Pulse 58   Temp 98.4 °F (36.9 °C) (Oral)   Resp 22   Ht 5' 1\" (1.549 m)   Wt 180 lb (81.6 kg)   LMP 2004 (Within Years)   SpO2 100%   BMI 34.01 kg/m²     Temperature Range: Temp: 98.4 °F (36.9 °C) Temp  Av.3 °F (36.8 °C)  Min: 97.3 °F (36.3 °C)  Max: 99.1 °F (37.3 °C)  The patient is seen and evaluated at bedside she is awake and alert in no acute distress. She is more appropriate today though she still reports feeling poorly. She cannot really tell me of any particular complaints. She has had a skeletal survey that does not show any lytic lesions and the free kappa Light Chain Is Elevated and the 24-Hour Light Chain Analysis Is Pending  Review of Systems   Constitutional:        She reports feeling \"terrible\"   Respiratory: Negative. Cardiovascular: Negative. Gastrointestinal: Negative. Physical Examination :     Physical Exam   HENT:   Head: Normocephalic and atraumatic. Cardiovascular: Regular rhythm. Murmur heard. Pulmonary/Chest: Effort normal and breath sounds normal.   Abdominal: Soft. Bowel sounds are normal.   Obese abdomen   Musculoskeletal: She exhibits no edema. Neurological: She is alert. Skin: No erythema.        Laboratory data:   I have independently reviewed the followinglabs:  CBC with Differential:   Recent Labs      18   0717   WBC  8.1   HGB  9.2*   HCT  28.7*   PLT  113*     BMP:   Recent Labs      1817  18   0644   NA  146*  141   K  3.4*  3.7   CL  102  100   CO2  34*  31   BUN  37*  33* Oral BID    rasagiline  1 mg Oral Daily    rOPINIRole  2 mg Oral TID    vitamin D  5,000 Units Oral Daily     Thank you for allowing us to participate in the care of this patient. Please call with questions. [facility]  Santosh Bravo MD  [employer] messaging  OFFICE: [phone]  CELL:     [phone]    Electronically signed by Santosh Bravo MD on 11/30/2018 at 11:18 AM    This note iscreated with the assistance of a speech recognition program.  While intending to generate a document that actually reflects the content of the visit, the document can still have some errors including those of syntax andsound a like substitutions which may escape proof reading. It such instances, actual meaning can be extrapolated by contextual diversion.

## 2018-11-30 NOTE — PROGRESS NOTES
[facility]    Progress Note    11/30/2018    10:48 AM    Name:   Gracie Betancourt  MRN:     [id number]     Acct:      [de-identified]   Room:   [address] [place] [address] Day:  3  Admit Date:  11/27/2018  9:30 AM    PCP:   Trevor Donovan DO  Code Status:  Full Code    Subjective:     C/C:   Chief Complaint   Patient presents with    Abdominal Pain     lower    Flank Pain     bilat    Dysuria     few days     Interval History Status: improved. Patient seen and examined at bedside, no acute events overnight. She feels better today  Breathing ok  CXR reviewed  HA resolved  Urine is growing ESBL   Paraproteinemia  Creat improving  Patient feels some chest tightness  Denies  shortness of breath, chills, fevers, nausea or vomiting. Patient vitals, labs and all providers notes were reviewed,from overnight shift and morning updates were noted and discussed with the nurse    Brief History:        The patient is a 72 y.o. Non-/non  female who presents with Abdominal Pain (lower); Flank Pain (bilat); and Dysuria (few days)   and she is admitted to the hospital for the management of  Acute cystitis   Patient with known h/o CHF, DM, Afib, CAD, CVA, COPD, HTN, HPL, PD, ECTOR, RLS as well had a recent admission for urosepsis presented to ER with progressive weakness and fatigue with associated chills and dysuria over the last 3 days, she denies any chest pain, sob, nausea, vomiting, fever or chills     In ER urine was found to have UTI and had some leukocytosis with worsening creatinine and also noted to have hypercalcemia.   She was admitted for furthe management       Review of Systems:     Constitutional:  negative for chills, fevers, sweats  Respiratory:  negative for cough, dyspnea on exertion, shortness of breath, wheezing  Cardiovascular:  negative for chest pain, chest pressure/discomfort, lower extremity edema, palpitations  Gastrointestinal:  negative

## 2018-11-30 NOTE — PROGRESS NOTES
Status: Impaired  Orientation Level: Oriented to person;Disoriented to situation;Oriented to time;Oriented to place  Observation/Palpation  Posture: Fair  Observation: pt is very short, some difficulty sitting EOB with feet not touching ground. Balance  Sitting Balance: Contact guard assistance  Standing Balance: Minimal assistance (x2)  Standing Balance  Sit to stand: Minimal assistance (x2)  Stand to sit: Minimal assistance (x2)  Functional Mobility  Functional - Mobility Device: Rolling Walker  Assist Level: Moderate assistance  Functional Mobility Comments: pt completed functional mob from bed to chair 3-4 steps with RW; pt has poor safety awareness  ADL  Feeding: Setup (pt found eating in bed on side with feet off edge of bed but pt still lying down (HOB raised). Pt assisted to a chair for improved postion to eat)  Grooming: Minimal assistance; Moderate assistance  LE Bathing: Moderate assistance;Maximum assistance  UE Dressing: Moderate assistance  LE Dressing: Maximum assistance  Toileting: Maximum assistance  Tone RUE  RUE Tone: Normotonic  Tone LUE  LUE Tone: Normotonic     Bed mobility  Supine to Sit: Moderate assistance;Minimal assistance  Transfers  Sit to stand: Minimal assistance (x2)  Stand to sit: Minimal assistance (x2)     Cognition  Overall Cognitive Status: Exceptions  Following Commands:  Follows one step commands with repetition  Attention Span: Difficulty dividing attention  Memory: Decreased recall of recent events;Decreased short term memory  Safety Judgement: Decreased awareness of need for assistance;Decreased awareness of need for safety  Problem Solving: Assistance required to generate solutions;Assistance required to implement solutions;Decreased awareness of errors;Assistance required to correct errors made  Insights: Decreased awareness of deficits  Initiation: Requires cues for some  Sequencing: Requires cues for some  Perception  Overall Perceptual Status: [facility]     Sensation  Overall Sensation Status: WFL        LUE AROM (degrees)  LUE AROM : WFL  RUE AROM (degrees)  RUE AROM : WFL  LUE Strength  Gross LUE Strength: WFL (B  UE's 3+/5)  RUE Strength  Gross RUE Strength: WFL                  Assessment   Performance deficits / Impairments: Decreased functional mobility ; Decreased ADL status; Decreased strength;Decreased safe awareness;Decreased endurance;Decreased balance  Prognosis: Fair  Decision Making: Medium Complexity  Patient Education: OT POC, discharge recommendations, safety with mob and transfers, benefits of being up in chair  REQUIRES OT FOLLOW UP: Yes  Activity Tolerance  Activity Tolerance: Patient limited by fatigue;Treatment limited secondary to decreased cognition  Safety Devices  Safety Devices in place: Yes  Type of devices: Call light within reach; Left in chair;Patient at risk for falls;Nurse notified;Gait belt         Plan   Plan  Times per week: 4x/week  Current Treatment Recommendations: Strengthening, Balance Training, Functional Mobility Training, Endurance Training, Safety Education & Training, Patient/Caregiver Education & Training, Equipment Evaluation, Education, & procurement, Positioning    G-Code  OT G-codes  Functional Assessment Tool Used: [facility]  Score: 13  Functional Limitation: Self care  Self Care Current Status (): At least 60 percent but less than 80 percent impaired, limited or restricted  Self Care Goal Status ():  At least 20 percent but less than 40 percent impaired, limited or restricted  OutComes Score                                           AM-PAC Score        AM-PAC Inpatient Daily Activity Raw Score: 13  AM-PAC Inpatient ADL T-Scale Score : 32.03  ADL Inpatient CMS 0-100% Score: 63.03  ADL Inpatient CMS G-Code Modifier : CL    Goals  Short term goals  Time Frame for Short term goals: by discharge, pt will  Short term goal 1: demo min A x 1 for ADL transfers with min cues for safety  Short term goal 2: demo min A with functional mob short distances for ADL completion with min cues for safety  Short term goal 3: demo min A with toileting routine  Short term goal 4: demo min A UB ADLs following set up at approp level  Short term goal 5: demo and verb good (-) understanding of fall prevention techs for home  Patient Goals   Patient goals : to go home       Therapy Time   Individual Concurrent Group Co-treatment   Time In 1315 (+10 min chart review)         Time Out 1338         Minutes 23              Patient would benefit from SNF for continued occupational therapy to increase independence with  ADL of bathing, dressing, toileting and grooming. Writer recommending SNF placement for for activity tolerance and strength which will increase independence with ADL's coordinated with bed mobility and chair transfers. Continued skilled OT services to address decreased safety awareness with ADL and IADL tasks and for education and increased independence with DME and AE for fall prevention and ec/ws techniques prior to d/c home. Co-treatment with PT warranted secondary to decreased safety and independence requiring 2 skilled therapy professionals to address individual discipline's goals. OT addressing preparation for ADL transfer, sitting balance for increased ADL performance, sitting/activity tolerance and functional reaching.     Johnnie Hinojosa, OT

## 2018-11-30 NOTE — PROGRESS NOTES
Dr. Salma Bailey rounded and updated on patient. Aware lactulose and miralax held d/t patient having BM's last night.

## 2018-12-01 PROBLEM — D47.2 MONOCLONAL GAMMOPATHY OF UNDETERMINED SIGNIFICANCE: Status: ACTIVE | Noted: 2018-11-29

## 2018-12-01 LAB
ANION GAP SERPL CALCULATED.3IONS-SCNC: 11 MMOL/L (ref 9–17)
BUN BLDV-MCNC: 27 MG/DL (ref 8–23)
BUN/CREAT BLD: 10 (ref 9–20)
CALCIUM SERPL-MCNC: 9.7 MG/DL (ref 8.6–10.4)
CHLORIDE BLD-SCNC: 99 MMOL/L (ref 98–107)
CO2: 31 MMOL/L (ref 20–31)
CREAT SERPL-MCNC: 2.65 MG/DL (ref 0.5–0.9)
GFR AFRICAN AMERICAN: 22 ML/MIN
GFR NON-AFRICAN AMERICAN: 18 ML/MIN
GFR SERPL CREATININE-BSD FRML MDRD: ABNORMAL ML/MIN/{1.73_M2}
GFR SERPL CREATININE-BSD FRML MDRD: ABNORMAL ML/MIN/{1.73_M2}
GLUCOSE BLD-MCNC: 95 MG/DL (ref 70–99)
POTASSIUM SERPL-SCNC: 3.9 MMOL/L (ref 3.7–5.3)
SODIUM BLD-SCNC: 141 MMOL/L (ref 135–144)

## 2018-12-01 PROCEDURE — 2700000000 HC OXYGEN THERAPY PER DAY

## 2018-12-01 PROCEDURE — 80048 BASIC METABOLIC PNL TOTAL CA: CPT

## 2018-12-01 PROCEDURE — 6360000002 HC RX W HCPCS: Performed by: INTERNAL MEDICINE

## 2018-12-01 PROCEDURE — 6370000000 HC RX 637 (ALT 250 FOR IP): Performed by: FAMILY MEDICINE

## 2018-12-01 PROCEDURE — 36415 COLL VENOUS BLD VENIPUNCTURE: CPT

## 2018-12-01 PROCEDURE — 6360000002 HC RX W HCPCS: Performed by: FAMILY MEDICINE

## 2018-12-01 PROCEDURE — 6370000000 HC RX 637 (ALT 250 FOR IP): Performed by: INTERNAL MEDICINE

## 2018-12-01 PROCEDURE — 2580000003 HC RX 258: Performed by: INTERNAL MEDICINE

## 2018-12-01 PROCEDURE — 1200000000 HC SEMI PRIVATE

## 2018-12-01 PROCEDURE — 97110 THERAPEUTIC EXERCISES: CPT

## 2018-12-01 PROCEDURE — 94640 AIRWAY INHALATION TREATMENT: CPT

## 2018-12-01 PROCEDURE — 2580000003 HC RX 258: Performed by: FAMILY MEDICINE

## 2018-12-01 PROCEDURE — 97530 THERAPEUTIC ACTIVITIES: CPT

## 2018-12-01 PROCEDURE — 99232 SBSQ HOSP IP/OBS MODERATE 35: CPT | Performed by: FAMILY MEDICINE

## 2018-12-01 PROCEDURE — 99232 SBSQ HOSP IP/OBS MODERATE 35: CPT | Performed by: INTERNAL MEDICINE

## 2018-12-01 RX ORDER — SPIRONOLACTONE 25 MG/1
25 TABLET ORAL DAILY
Status: DISCONTINUED | OUTPATIENT
Start: 2018-12-01 | End: 2018-12-10 | Stop reason: HOSPADM

## 2018-12-01 RX ORDER — POLYETHYLENE GLYCOL 3350 17 G/17G
17 POWDER, FOR SOLUTION ORAL DAILY
Status: DISCONTINUED | OUTPATIENT
Start: 2018-12-01 | End: 2018-12-10 | Stop reason: HOSPADM

## 2018-12-01 RX ORDER — SIMETHICONE 80 MG
80 TABLET,CHEWABLE ORAL EVERY 6 HOURS PRN
Status: DISCONTINUED | OUTPATIENT
Start: 2018-12-01 | End: 2018-12-01

## 2018-12-01 RX ADMIN — ASPIRIN 81 MG: 81 TABLET, COATED ORAL at 10:05

## 2018-12-01 RX ADMIN — LINAGLIPTIN 2.5 MG: 5 TABLET, FILM COATED ORAL at 10:05

## 2018-12-01 RX ADMIN — IPRATROPIUM BROMIDE AND ALBUTEROL SULFATE 3 ML: .5; 3 SOLUTION RESPIRATORY (INHALATION) at 19:36

## 2018-12-01 RX ADMIN — POLYVINYL ALCOHOL 1 DROP: 14 SOLUTION/ DROPS OPHTHALMIC at 16:53

## 2018-12-01 RX ADMIN — ROPINIROLE HYDROCHLORIDE 2 MG: 2 TABLET, FILM COATED ORAL at 13:07

## 2018-12-01 RX ADMIN — METOPROLOL TARTRATE 12.5 MG: 25 TABLET ORAL at 22:03

## 2018-12-01 RX ADMIN — POLYVINYL ALCOHOL 1 DROP: 14 SOLUTION/ DROPS OPHTHALMIC at 23:51

## 2018-12-01 RX ADMIN — POLYETHYLENE GLYCOL (3350) 17 G: 17 POWDER, FOR SOLUTION ORAL at 10:05

## 2018-12-01 RX ADMIN — DOCUSATE SODIUM 100 MG: 100 CAPSULE, LIQUID FILLED ORAL at 22:04

## 2018-12-01 RX ADMIN — HEPARIN SODIUM 5000 UNITS: 5000 INJECTION INTRAVENOUS; SUBCUTANEOUS at 13:07

## 2018-12-01 RX ADMIN — POTASSIUM CHLORIDE 20 MEQ: 40 SOLUTION ORAL at 10:06

## 2018-12-01 RX ADMIN — IPRATROPIUM BROMIDE AND ALBUTEROL SULFATE 3 ML: .5; 3 SOLUTION RESPIRATORY (INHALATION) at 14:38

## 2018-12-01 RX ADMIN — HYDROCODONE BITARTRATE AND ACETAMINOPHEN 1 TABLET: 5; 325 TABLET ORAL at 13:43

## 2018-12-01 RX ADMIN — ROPINIROLE HYDROCHLORIDE 2 MG: 2 TABLET, FILM COATED ORAL at 10:06

## 2018-12-01 RX ADMIN — POLYVINYL ALCOHOL 1 DROP: 14 SOLUTION/ DROPS OPHTHALMIC at 05:49

## 2018-12-01 RX ADMIN — CARBIDOPA AND LEVODOPA 1 TABLET: 25; 100 TABLET, EXTENDED RELEASE ORAL at 16:53

## 2018-12-01 RX ADMIN — DOCUSATE SODIUM 100 MG: 100 CAPSULE, LIQUID FILLED ORAL at 10:05

## 2018-12-01 RX ADMIN — Medication 2 PUFF: at 19:36

## 2018-12-01 RX ADMIN — AMIODARONE HYDROCHLORIDE 200 MG: 200 TABLET ORAL at 10:05

## 2018-12-01 RX ADMIN — CARBIDOPA AND LEVODOPA 1 TABLET: 25; 100 TABLET, EXTENDED RELEASE ORAL at 10:06

## 2018-12-01 RX ADMIN — CARBIDOPA AND LEVODOPA 1 TABLET: 25; 100 TABLET, EXTENDED RELEASE ORAL at 22:04

## 2018-12-01 RX ADMIN — IPRATROPIUM BROMIDE AND ALBUTEROL SULFATE 3 ML: .5; 3 SOLUTION RESPIRATORY (INHALATION) at 08:06

## 2018-12-01 RX ADMIN — ATORVASTATIN CALCIUM 20 MG: 20 TABLET, FILM COATED ORAL at 10:05

## 2018-12-01 RX ADMIN — HYDROCODONE BITARTRATE AND ACETAMINOPHEN 1 TABLET: 5; 325 TABLET ORAL at 05:59

## 2018-12-01 RX ADMIN — RASAGILINE 1 MG: 0.5 TABLET ORAL at 10:05

## 2018-12-01 RX ADMIN — MEROPENEM 500 MG: 500 INJECTION, POWDER, FOR SOLUTION INTRAVENOUS at 10:07

## 2018-12-01 RX ADMIN — HYDROCODONE BITARTRATE AND ACETAMINOPHEN 1 TABLET: 5; 325 TABLET ORAL at 22:04

## 2018-12-01 RX ADMIN — ROPINIROLE HYDROCHLORIDE 2 MG: 2 TABLET, FILM COATED ORAL at 22:03

## 2018-12-01 RX ADMIN — Medication 2 PUFF: at 08:07

## 2018-12-01 RX ADMIN — Medication 10 ML: at 22:15

## 2018-12-01 RX ADMIN — METOPROLOL TARTRATE 12.5 MG: 25 TABLET ORAL at 10:05

## 2018-12-01 RX ADMIN — CARBIDOPA AND LEVODOPA 1 TABLET: 25; 100 TABLET, EXTENDED RELEASE ORAL at 13:07

## 2018-12-01 RX ADMIN — SPIRONOLACTONE 25 MG: 25 TABLET ORAL at 10:05

## 2018-12-01 ASSESSMENT — PAIN SCALES - GENERAL
PAINLEVEL_OUTOF10: 10
PAINLEVEL_OUTOF10: 10
PAINLEVEL_OUTOF10: 6
PAINLEVEL_OUTOF10: 10

## 2018-12-01 ASSESSMENT — PAIN DESCRIPTION - PAIN TYPE: TYPE: CHRONIC PAIN

## 2018-12-01 ASSESSMENT — PAIN DESCRIPTION - LOCATION: LOCATION: GENERALIZED

## 2018-12-01 NOTE — PLAN OF CARE
Problem: Falls - Risk of:  Goal: Will remain free from falls  Will remain free from falls   Outcome: Ongoing      Problem: Risk for Impaired Skin Integrity  Goal: Tissue integrity - skin and mucous membranes  Structural intactness and normal physiological function of skin and  mucous membranes.    Outcome: Ongoing      Problem: Pain:  Goal: Control of acute pain  Control of acute pain   Outcome: Ongoing

## 2018-12-01 NOTE — PROGRESS NOTES
[address] [facility]    Progress Note    12/1/2018    8:25 AM    Name:   Edilberto George  MRN:     [id number]     Acct:      [de-identified]   Room:   [address] [place] [address] Day:  4  Admit Date:  11/27/2018  9:30 AM    PCP:   Akash Calzada DO  Code Status:  Full Code    Subjective:     C/C:   Chief Complaint   Patient presents with    Abdominal Pain     lower    Flank Pain     bilat    Dysuria     few days     Interval History Status: improved. Patient seen and examined at bedside, no acute events overnight. She feels better today  Breathing ok  Has some eye discharge  Urine is growing ESBL  Creat slightly worse  Denies  shortness of breath, chills, fevers, nausea or vomiting. Patient vitals, labs and all providers notes were reviewed,from overnight shift and morning updates were noted and discussed with the nurse    Brief History:        The patient is a 72 y.o. Non-/non  female who presents with Abdominal Pain (lower); Flank Pain (bilat); and Dysuria (few days)   and she is admitted to the hospital for the management of  Acute cystitis   Patient with known h/o CHF, DM, Afib, CAD, CVA, COPD, HTN, HPL, PD, ECTOR, RLS as well had a recent admission for urosepsis presented to ER with progressive weakness and fatigue with associated chills and dysuria over the last 3 days, she denies any chest pain, sob, nausea, vomiting, fever or chills     In ER urine was found to have UTI and had some leukocytosis with worsening creatinine and also noted to have hypercalcemia.   She was admitted for furthe management       Review of Systems:     Constitutional:  negative for chills, fevers, sweats  Respiratory:  negative for cough, dyspnea on exertion, shortness of breath, wheezing  Cardiovascular:  negative for chest pain, chest pressure/discomfort, lower extremity edema, palpitations  Gastrointestinal:  negative for abdominal pain, constipation, diarrhea, nausea, Parkinson disease ([facility] [address] Restless leg syndrome; Spinal stenosis in cervical region; Type II or unspecified type diabetes mellitus without mention of complication, not stated as uncontrolled; Unspecified sleep apnea; Urethral caruncle; and Wears glasses. Social History:   reports that she quit smoking about 48 years ago. Her smoking use included Cigarettes. She has a 30.00 pack-year smoking history. She has never used smokeless tobacco. She reports that she drinks about 1.2 oz of alcohol per week . She reports that she does not use drugs. Family History:   Family History   Problem Relation Age of Onset    Heart Failure Mother     Hypertension Mother     Heart Disease Mother     High Blood Pressure Mother        Vitals:  BP (!) 150/90   Pulse 63   Temp 99 °F (37.2 °C) (Oral)   Resp 20   Ht 5' 1\" (1.549 m)   Wt 180 lb (81.6 kg)   LMP 2004 (Within Years)   SpO2 100%   BMI 34.01 kg/m²   Temp (24hrs), Av.4 °F (36.9 °C), Min:97.9 °F (36.6 °C), Max:99 °F (37.2 °C)    No results for input(s): POCGLU in the last 72 hours. I/O (24Hr):     Intake/Output Summary (Last 24 hours) at 18 0825  Last data filed at 18 0552   Gross per 24 hour   Intake              470 ml   Output              412 ml   Net               58 ml       Labs:    Hematology:  Recent Labs      18   1102   WBC  7.2   RBC  2.80*   HGB  8.8*   HCT  27.7*   MCV  99.0   MCH  31.3   MCHC  31.6   RDW  15.2*   PLT  106*   MPV  NOT REPORTED     Chemistry:  Recent Labs      18   0644  18   1102   NA  141  142   K  3.7  3.9   CL  100  100   CO2  31  31   GLUCOSE  90  103*   BUN  33*  27*   CREATININE  2.70*  2.44*   ANIONGAP  10  11   LABGLOM  17*  20*   GFRAA  21*  24*   CALCIUM  10.3  10.0     No results for input(s): PROT, LABALBU, LABA1C, N9APUKT, U1XGUVH, FT4, TSH, AST, ALT, LDH, GGT, ALKPHOS, LABGGT, BILITOT, BILIDIR, AMMONIA, AMYLASE, LIPASE, LACTATE, CHOL, HDL, LDLCHOLESTEROL, CHOLHDLRATIO, TRIG, VLDL, Controlled type 2 diabetes mellitus with stage 3 chronic kidney disease, without long-term current use of insulin (HCC) [E11.22, N18.3]        Plan:           Principal Problem:    Acute kidney injury ([facility] [address]  Active Problems:    Controlled type 2 diabetes mellitus with stage 3 chronic kidney disease, without long-term current use of insulin (HCC)    ECTOR on CPAP    Acute cystitis with hematuria    Chronic respiratory failure with hypoxia and hypercapnia (HCC)    Anemia of chronic renal failure, stage 4 (severe) ([facility])    Chronic diastolic congestive heart failure ([facility])    History of ESBL E. coli infection    Hypercalcemia  Resolved Problems:    Atrial fibrillation with RVR (HCC)    Acute kidney injury superimposed on chronic kidney disease ([facility])       Creatinine improving , f/u nephrology recommendations    Abx ( Meropenem) , ID currently following, no need for Abx on DC    Check CT abdomen/pelvis WO contrast, show stool burden, added bowel regimen, patient had BMalready    DC'ed do    Hypercalcemia resolved currently, was on Ca supplement and vit D as she had long history of hypocalcemia    MUGS:  Hem/onc on board    Continue supplemental O2    Continue other chronic home meds and inhalers    Blood culture NGTD    DVT and GI PPx     H/O afib? Not on [facility]?, no clear reason why    Will discharge when arrangements complete and ok with other services.   Follow-up with PCP in one week, Lesly Hill, DO  Notify PCP of discharge    Discussed with pt, nurse   DC in am if cr stable      Pierre Conrad MD  12/1/2018  8:25 AM

## 2018-12-01 NOTE — PROGRESS NOTES
gastrointestinal endoscopy (08/29/2018); and Upper gastrointestinal endoscopy (N/A, 8/29/2018). Restrictions  Restrictions/Precautions  Restrictions/Precautions: General Precautions, Fall Risk, Contact Precautions, Up as Tolerated  Position Activity Restriction  Other position/activity restrictions: Fall risk  Subjective   General  Chart Reviewed: Yes  Additional Pertinent Hx: CHF, CVA x 3,   Response To Previous Treatment: Not applicable  Family / Caregiver Present: No  Subjective  Subjective: Patient agreeable to PT. General Comment  Comments: LILLY Banuelos reports patient appropriate for PT. Pain Screening  Patient Currently in Pain: Denies  Vital Signs  Patient Currently in Pain: Denies       Orientation  Orientation  Overall Orientation Status: Within Normal Limits  Cognition      Objective    Bed Mobility  Rolling: Minimal assistance  Supine to sit: Moderate assistance  Scooting: Moderate assistance     Transfers  Sit to Stand: Moderate Assistance (with Jacqudonnell Alcides)  Stand to sit: Minimal Assistance (with juancho stedy)  Bed to Chair: Moderate assistance (with juancho stedy)  Ambulation  Ambulation?: No     Balance  Posture: Fair  Sitting - Static: Fair;+  Sitting - Dynamic: Fair;+  Standing - Static: Fair;-  Standing - Dynamic: Fair;-  Exercises  Quad Sets: 10  Heelslides: 10  Gluteal Sets: 10  Knee Long Arc Quad: 10  Ankle Pumps: 20   Comments: Sit to stands with min A to mod A in juancho stedy for donning brief. Other Activities: Other (see comment) (Bed mobility including rolling R and L for use of bed pan and boris care)              Assessment   Body structures, Functions, Activity limitations: Decreased functional mobility ; Decreased ADL status; Decreased strength;Decreased safe awareness;Decreased endurance;Decreased balance  Assessment: Patient with decreased functional mobility, strength and safety awareness.  Strongly recommend D/C to a SNF to restore strength, ROM and increase independence with

## 2018-12-02 LAB
ANION GAP SERPL CALCULATED.3IONS-SCNC: 10 MMOL/L (ref 9–17)
BUN BLDV-MCNC: 24 MG/DL (ref 8–23)
BUN/CREAT BLD: 12 (ref 9–20)
CALCIUM SERPL-MCNC: 9.4 MG/DL (ref 8.6–10.4)
CHLORIDE BLD-SCNC: 98 MMOL/L (ref 98–107)
CO2: 32 MMOL/L (ref 20–31)
CREAT SERPL-MCNC: 2.05 MG/DL (ref 0.5–0.9)
GFR AFRICAN AMERICAN: 29 ML/MIN
GFR NON-AFRICAN AMERICAN: 24 ML/MIN
GFR SERPL CREATININE-BSD FRML MDRD: ABNORMAL ML/MIN/{1.73_M2}
GFR SERPL CREATININE-BSD FRML MDRD: ABNORMAL ML/MIN/{1.73_M2}
GLUCOSE BLD-MCNC: 127 MG/DL (ref 65–105)
GLUCOSE BLD-MCNC: 130 MG/DL (ref 70–99)
POTASSIUM SERPL-SCNC: 4.2 MMOL/L (ref 3.7–5.3)
SODIUM BLD-SCNC: 140 MMOL/L (ref 135–144)

## 2018-12-02 PROCEDURE — 83036 HEMOGLOBIN GLYCOSYLATED A1C: CPT

## 2018-12-02 PROCEDURE — 99232 SBSQ HOSP IP/OBS MODERATE 35: CPT | Performed by: FAMILY MEDICINE

## 2018-12-02 PROCEDURE — 94640 AIRWAY INHALATION TREATMENT: CPT

## 2018-12-02 PROCEDURE — 36415 COLL VENOUS BLD VENIPUNCTURE: CPT

## 2018-12-02 PROCEDURE — 94760 N-INVAS EAR/PLS OXIMETRY 1: CPT

## 2018-12-02 PROCEDURE — 6360000002 HC RX W HCPCS: Performed by: FAMILY MEDICINE

## 2018-12-02 PROCEDURE — 6370000000 HC RX 637 (ALT 250 FOR IP): Performed by: INTERNAL MEDICINE

## 2018-12-02 PROCEDURE — 2580000003 HC RX 258: Performed by: INTERNAL MEDICINE

## 2018-12-02 PROCEDURE — 99232 SBSQ HOSP IP/OBS MODERATE 35: CPT | Performed by: INTERNAL MEDICINE

## 2018-12-02 PROCEDURE — 6370000000 HC RX 637 (ALT 250 FOR IP): Performed by: FAMILY MEDICINE

## 2018-12-02 PROCEDURE — 80048 BASIC METABOLIC PNL TOTAL CA: CPT

## 2018-12-02 PROCEDURE — 82947 ASSAY GLUCOSE BLOOD QUANT: CPT

## 2018-12-02 PROCEDURE — 2580000003 HC RX 258: Performed by: FAMILY MEDICINE

## 2018-12-02 PROCEDURE — 6360000002 HC RX W HCPCS: Performed by: INTERNAL MEDICINE

## 2018-12-02 PROCEDURE — 2700000000 HC OXYGEN THERAPY PER DAY

## 2018-12-02 PROCEDURE — 1200000000 HC SEMI PRIVATE

## 2018-12-02 RX ORDER — DEXTROSE MONOHYDRATE 50 MG/ML
100 INJECTION, SOLUTION INTRAVENOUS PRN
Status: DISCONTINUED | OUTPATIENT
Start: 2018-12-02 | End: 2018-12-10 | Stop reason: HOSPADM

## 2018-12-02 RX ORDER — DEXTROSE MONOHYDRATE 25 G/50ML
12.5 INJECTION, SOLUTION INTRAVENOUS PRN
Status: DISCONTINUED | OUTPATIENT
Start: 2018-12-02 | End: 2018-12-10 | Stop reason: HOSPADM

## 2018-12-02 RX ORDER — HEPARIN SODIUM 5000 [USP'U]/ML
5000 INJECTION, SOLUTION INTRAVENOUS; SUBCUTANEOUS EVERY 8 HOURS SCHEDULED
Status: DISCONTINUED | OUTPATIENT
Start: 2018-12-02 | End: 2018-12-02

## 2018-12-02 RX ORDER — NICOTINE POLACRILEX 4 MG
15 LOZENGE BUCCAL PRN
Status: DISCONTINUED | OUTPATIENT
Start: 2018-12-02 | End: 2018-12-10 | Stop reason: HOSPADM

## 2018-12-02 RX ADMIN — POTASSIUM CHLORIDE 20 MEQ: 40 SOLUTION ORAL at 08:54

## 2018-12-02 RX ADMIN — MEROPENEM 500 MG: 500 INJECTION, POWDER, FOR SOLUTION INTRAVENOUS at 10:05

## 2018-12-02 RX ADMIN — POLYVINYL ALCOHOL 1 DROP: 14 SOLUTION/ DROPS OPHTHALMIC at 06:25

## 2018-12-02 RX ADMIN — METOPROLOL TARTRATE 12.5 MG: 25 TABLET ORAL at 08:54

## 2018-12-02 RX ADMIN — AMIODARONE HYDROCHLORIDE 200 MG: 200 TABLET ORAL at 08:54

## 2018-12-02 RX ADMIN — CARBIDOPA AND LEVODOPA 1 TABLET: 25; 100 TABLET, EXTENDED RELEASE ORAL at 16:14

## 2018-12-02 RX ADMIN — APIXABAN 5 MG: 5 TABLET, FILM COATED ORAL at 21:26

## 2018-12-02 RX ADMIN — ATORVASTATIN CALCIUM 20 MG: 20 TABLET, FILM COATED ORAL at 08:54

## 2018-12-02 RX ADMIN — METOPROLOL TARTRATE 12.5 MG: 25 TABLET ORAL at 21:27

## 2018-12-02 RX ADMIN — CARBIDOPA AND LEVODOPA 1 TABLET: 25; 100 TABLET, EXTENDED RELEASE ORAL at 21:26

## 2018-12-02 RX ADMIN — SPIRONOLACTONE 25 MG: 25 TABLET ORAL at 08:54

## 2018-12-02 RX ADMIN — IPRATROPIUM BROMIDE AND ALBUTEROL SULFATE 3 ML: .5; 3 SOLUTION RESPIRATORY (INHALATION) at 20:40

## 2018-12-02 RX ADMIN — IPRATROPIUM BROMIDE AND ALBUTEROL SULFATE 3 ML: .5; 3 SOLUTION RESPIRATORY (INHALATION) at 15:52

## 2018-12-02 RX ADMIN — ROPINIROLE HYDROCHLORIDE 2 MG: 2 TABLET, FILM COATED ORAL at 12:53

## 2018-12-02 RX ADMIN — DOCUSATE SODIUM 100 MG: 100 CAPSULE, LIQUID FILLED ORAL at 08:54

## 2018-12-02 RX ADMIN — APIXABAN 5 MG: 5 TABLET, FILM COATED ORAL at 16:14

## 2018-12-02 RX ADMIN — RASAGILINE 1 MG: 0.5 TABLET ORAL at 08:54

## 2018-12-02 RX ADMIN — IPRATROPIUM BROMIDE AND ALBUTEROL SULFATE 3 ML: .5; 3 SOLUTION RESPIRATORY (INHALATION) at 09:11

## 2018-12-02 RX ADMIN — Medication 10 ML: at 21:28

## 2018-12-02 RX ADMIN — POLYETHYLENE GLYCOL (3350) 17 G: 17 POWDER, FOR SOLUTION ORAL at 08:54

## 2018-12-02 RX ADMIN — HYDROCODONE BITARTRATE AND ACETAMINOPHEN 1 TABLET: 5; 325 TABLET ORAL at 21:26

## 2018-12-02 RX ADMIN — ALPRAZOLAM 0.25 MG: 0.25 TABLET ORAL at 21:26

## 2018-12-02 RX ADMIN — Medication 2 PUFF: at 09:11

## 2018-12-02 RX ADMIN — ASPIRIN 81 MG: 81 TABLET, COATED ORAL at 08:55

## 2018-12-02 RX ADMIN — HEPARIN SODIUM 5000 UNITS: 5000 INJECTION INTRAVENOUS; SUBCUTANEOUS at 08:58

## 2018-12-02 RX ADMIN — Medication 2 PUFF: at 20:45

## 2018-12-02 RX ADMIN — LINAGLIPTIN 2.5 MG: 5 TABLET, FILM COATED ORAL at 08:54

## 2018-12-02 RX ADMIN — CARBIDOPA AND LEVODOPA 1 TABLET: 25; 100 TABLET, EXTENDED RELEASE ORAL at 12:53

## 2018-12-02 RX ADMIN — HEPARIN SODIUM 5000 UNITS: 5000 INJECTION INTRAVENOUS; SUBCUTANEOUS at 12:53

## 2018-12-02 RX ADMIN — ROPINIROLE HYDROCHLORIDE 2 MG: 2 TABLET, FILM COATED ORAL at 08:54

## 2018-12-02 RX ADMIN — HYDROCODONE BITARTRATE AND ACETAMINOPHEN 1 TABLET: 5; 325 TABLET ORAL at 06:25

## 2018-12-02 RX ADMIN — MELATONIN TAB 3 MG 3 MG: 3 TAB at 02:47

## 2018-12-02 RX ADMIN — ROPINIROLE HYDROCHLORIDE 2 MG: 2 TABLET, FILM COATED ORAL at 21:26

## 2018-12-02 RX ADMIN — CARBIDOPA AND LEVODOPA 1 TABLET: 25; 100 TABLET, EXTENDED RELEASE ORAL at 08:54

## 2018-12-02 RX ADMIN — MEROPENEM 500 MG: 500 INJECTION, POWDER, FOR SOLUTION INTRAVENOUS at 01:10

## 2018-12-02 ASSESSMENT — ENCOUNTER SYMPTOMS
GASTROINTESTINAL NEGATIVE: 1
RESPIRATORY NEGATIVE: 1

## 2018-12-02 ASSESSMENT — PAIN SCALES - GENERAL
PAINLEVEL_OUTOF10: 7
PAINLEVEL_OUTOF10: 10

## 2018-12-02 NOTE — PROGRESS NOTES
negative for dizziness, headache    Medications: Allergies: Allergies   Allergen Reactions    Dye [Barium-Containing Compounds] Other (See Comments)     Cause Afib per     Pcn [Penicillins] Itching and Swelling    Bactrim [Sulfamethoxazole-Trimethoprim] Other (See Comments)     Allergic Nephritis       Current Meds:   Scheduled Meds:    polyethylene glycol  17 g Oral Daily    spironolactone  25 mg Oral Daily    ipratropium-albuterol  3 mL Inhalation TID    potassium chloride  20 mEq Oral Daily    sodium chloride flush  10 mL Intravenous 2 times per day    docusate sodium  100 mg Oral BID    meropenem  500 mg Intravenous Q12H    amiodarone  200 mg Oral Daily    aspirin  81 mg Oral Daily    atorvastatin  20 mg Oral Daily    mometasone-formoterol  2 puff Inhalation BID    carbidopa-levodopa  1 tablet Oral 4x Daily    linagliptin  2.5 mg Oral Daily    metoprolol tartrate  12.5 mg Oral BID    rasagiline  1 mg Oral Daily    rOPINIRole  2 mg Oral TID     Continuous Infusions:     PRN Meds: polyvinyl alcohol, HYDROcodone 5 mg - acetaminophen, sodium chloride flush, nicotine, ondansetron **OR** ondansetron, ALPRAZolam, melatonin    Data:     Past Medical History:   has a past medical history of Acute on chronic diastolic congestive heart failure ([facility] Anesthesia complication; Asthma; Atrial fibrillation ([facility] Cataracts, bilateral; Cellulitis; Cerebral artery occlusion with cerebral infarction ([facility] Chronic kidney disease; Constipation; COPD (chronic obstructive pulmonary disease) ([facility] Difficult intravenous access; Diverticulosis; DM2 (diabetes mellitus, type 2) ([facility] Full dentures; GERD (gastroesophageal reflux disease); Headache(784.0); [place] (hard of hearing); Hyperlipidemia; Hyperplastic polyp of intestine; Hypertension; Impaired ambulation; Kidney stone; Morbid obesity with BMI of 40.0-44.9, adult ([facility] ECTOR on CPAP; Osteoarthritis; Parkinson disease ([facility]  Restless leg syndrome; Spinal stenosis in cervical region; Type II or unspecified type diabetes mellitus without mention of complication, not stated as uncontrolled; Unspecified sleep apnea; Urethral caruncle; and Wears glasses. Social History:   reports that she quit smoking about 48 years ago. Her smoking use included Cigarettes. She has a 30.00 pack-year smoking history. She has never used smokeless tobacco. She reports that she drinks about 1.2 oz of alcohol per week . She reports that she does not use drugs. Family History:   Family History   Problem Relation Age of Onset    Heart Failure Mother     Hypertension Mother     Heart Disease Mother     High Blood Pressure Mother        Vitals:  BP (!) 135/43   Pulse 64   Temp 98.2 °F (36.8 °C) (Oral)   Resp 16   Ht 5' 1\" (1.549 m)   Wt 180 lb (81.6 kg)   LMP 2004 (Within Years)   SpO2 98%   BMI 34.01 kg/m²   Temp (24hrs), Av.6 °F (37 °C), Min:97.9 °F (36.6 °C), Max:99.7 °F (37.6 °C)    No results for input(s): POCGLU in the last 72 hours. I/O (24Hr):     Intake/Output Summary (Last 24 hours) at 18 0809  Last data filed at 18 1913   Gross per 24 hour   Intake              530 ml   Output              100 ml   Net              430 ml       Labs:    Hematology:  Recent Labs      18   1102   WBC  7.2   RBC  2.80*   HGB  8.8*   HCT  27.7*   MCV  99.0   MCH  31.3   MCHC  31.6   RDW  15.2*   PLT  106*   MPV  NOT REPORTED     Chemistry:  Recent Labs      18   1102  18   0855   NA  142  141   K  3.9  3.9   CL  100  99   CO2  31  31   GLUCOSE  103*  95   BUN  27*  27*   CREATININE  2.44*  2.65*   ANIONGAP  11  11   LABGLOM  20*  18*   GFRAA  24*  22*   CALCIUM  10.0  9.7     No results for input(s): PROT, LABALBU, LABA1C, I4IJIBM, Y7MCVIO, FT4, TSH, AST, ALT, LDH, GGT, ALKPHOS, LABGGT, BILITOT, BILIDIR, AMMONIA, AMYLASE, LIPASE, LACTATE, CHOL, HDL, LDLCHOLESTEROL, CHOLHDLRATIO, TRIG, VLDL, OSZ98EF, PHENYTOIN, PHENYF, URICACID, POCGLU in the last 72 hours. Lab Results   Component Value Date/Time    SPECIAL RIGHT ARM, 10ML 11/27/2018 10:20 AM     Lab Results   Component Value Date/Time    CULTURE NO GROWTH 5 DAYS 11/27/2018 10:20 AM       Lab Results   Component Value Date    POCPH 7.46 09/22/2018    POCPCO2 51 09/22/2018    POCPO2 73 09/22/2018    POCHCO3 36.0 09/22/2018    NBEA NOT REPORTED 09/22/2018    PBEA 12 09/22/2018    WBN4NOJ 38 09/22/2018    CNPM2UZJ 95 09/22/2018    FIO2 NOT REPORTED 10/06/2018       Radiology:  CT OF THE ABDOMEN AND PELVIS WITHOUT CONTRAST 11/27/2018 4:35 pm    Nonobstructing bilateral nephrolithiasis. Gallbladder sludge. Increased stool burden especially in the rectum measuring up to 7 cm. This  could be due to constipation. Fecal impaction could also be considered.       Physical Examination:        General appearance:  alert, cooperative and no distress, nasal canula  Lungs:  minimal bibasilar crackles  ular rate and rhythm, no murmur  Abdomen:  soft, nontender, nondistended, normal bowel sounds, no masses, hepatomegaly, splenomegaly  Extremities:  no edema, redness, tenderness in the calves  Skin:  no gross lesions, rashes, induration    Assessment:        Primary Problem  Acute kidney injury [facility])    Active Hospital Problems    Diagnosis Date Noted    Acute kidney injury ([facility] [address] [N17.9] 11/27/2018     Priority: High    Monoclonal gammopathy of undetermined significance [D47.2] 11/29/2018    Acute nonintractable headache [R51]     Hypercalcemia [E83.52] 11/27/2018    History of ESBL E. coli infection [Z86.19]     Chronic diastolic congestive heart failure ([address] [I50.32] 09/23/2018    Anemia of chronic renal failure, stage 4 (severe) ([facility] [address] [N18.4, D63.1] 04/25/2018    Chronic respiratory failure with hypoxia and hypercapnia (HCC) [C30.34, J96.12] 12/17/2017    Acute cystitis with hematuria [N30.01] 12/28/2016    ECTOR on CPAP [G47.33, Z99.89]     Controlled type 2 diabetes mellitus with stage 3

## 2018-12-02 NOTE — PROGRESS NOTES
symmetric air entry  Heart - normal rate, regular rhythm, normal S1, S2, no murmurs  Abdomen - soft, nontender, nondistended, no masses or organomegaly  Neurological - alert, oriented, normal speech, no focal findings or movement disorder noted  Extremities - peripheral pulses normal, no pedal edema, no clubbing or cyanosis  Skin - normal coloration and turgor, no rashes, no suspicious skin lesions noted         Data:    No intake/output data recorded. In: 240 [P.O.:240]  Out: -     CBC:   Recent Labs      11/30/18   1102   WBC  7.2   HGB  8.8*   PLT  106*     BMP:    Recent Labs      11/30/18   1102  12/01/18   0855  12/02/18   0900   NA  142  141  140   K  3.9  3.9  4.2   CL  100  99  98   CO2  31  31  32*   BUN  27*  27*  24*   CREATININE  2.44*  2.65*  2.05*   GLUCOSE  103*  95  130*     Hepatic: No results for input(s): AST, ALT, ALB, BILITOT, ALKPHOS in the last 72 hours. INR: No results for input(s): INR in the last 72 hours. PTT:No results for input(s): PTT in the last 72 hours. Results for orders placed or performed during the hospital encounter of 11/27/18   Culture Blood #1   Result Value Ref Range    Specimen Description . BLOOD     Special Requests 9cc lta     Culture NO GROWTH 5 DAYS     Status Pending    Culture Blood #1   Result Value Ref Range    Specimen Description . BLOOD     Special Requests RIGHT ARM, 10ML     Culture NO GROWTH 5 DAYS     Status Pending    Urine culture clean catch   Result Value Ref Range    Specimen Description . CLEAN CATCH URINE     Special Requests NOT REPORTED     Culture (A)      ESCHERICHIA COLI >[id number] CFU/ML THIS ORGANISM IS AN EXTENDED-SPECTRUM    Culture (A)       BETA-LACTAMASE  AND RESISTANCE TO THERAPY WITH PENICILLINS,    Culture (A)       CEPHALOSPORINS AND AZTREONAM IS EXPECTED. THESE ORGANISMS GENERALLY REMAIN    Culture (A)       SUSCEPTIBLE TO CARBAPENEMS. CONSIDER ID CONSULTATION.     Status FINAL 11/28/2018     Organism EC        Susceptibility Escherichia coli - ERIN     amikacin NOT REPORTED       ampicillin >=32 RESISTANT Resistant      ampicillin-sulbactam NOT REPORTED       aztreonam >=64 RESISTANT Resistant      ceFAZolin Value in next row Resistant       >=64 RESISTANTCefazolin sensitivity results can be used to predict the effectiveness of oral cephalosporins (eg. Cephalexin) in uncomplicated Urinary Tract Infections due to E. coli, K. pneumoniae, and P. mirabilis     cefepime Value in next row Resistant       >=64 RESISTANTCefazolin sensitivity results can be used to predict the effectiveness of oral cephalosporins (eg. Cephalexin) in uncomplicated Urinary Tract Infections due to E. coli, K. pneumoniae, and P. mirabilis     cefTRIAXone Value in next row Resistant       >=64 RESISTANTCefazolin sensitivity results can be used to predict the effectiveness of oral cephalosporins (eg. Cephalexin) in uncomplicated Urinary Tract Infections due to E. coli, K. pneumoniae, and P. mirabilis     ciprofloxacin Value in next row Resistant       >=64 RESISTANTCefazolin sensitivity results can be used to predict the effectiveness of oral cephalosporins (eg. Cephalexin) in uncomplicated Urinary Tract Infections due to E. coli, K. pneumoniae, and P. mirabilis     ertapenem Value in next row        >=64 RESISTANTCefazolin sensitivity results can be used to predict the effectiveness of oral cephalosporins (eg. Cephalexin) in uncomplicated Urinary Tract Infections due to E. coli, K. pneumoniae, and P. mirabilis     Confirmatory Extended Spectrum Beta-Lactamase Value in next row Resistant       >=64 RESISTANTCefazolin sensitivity results can be used to predict the effectiveness of oral cephalosporins (eg.  Cephalexin) in uncomplicated Urinary Tract Infections due to E. coli, K. pneumoniae, and P. mirabilis     gentamicin Value in next row Sensitive       >=64 RESISTANTCefazolin sensitivity results can be used to predict the effectiveness of oral cephalosporins (eg. Cephalexin) in uncomplicated Urinary Tract Infections due to E. coli, K. pneumoniae, and P. mirabilis     meropenem Value in next row Sensitive       >=64 RESISTANTCefazolin sensitivity results can be used to predict the effectiveness of oral cephalosporins (eg. Cephalexin) in uncomplicated Urinary Tract Infections due to E. coli, K. pneumoniae, and P. mirabilis     nitrofurantoin Value in next row Sensitive       >=64 RESISTANTCefazolin sensitivity results can be used to predict the effectiveness of oral cephalosporins (eg. Cephalexin) in uncomplicated Urinary Tract Infections due to E. coli, K. pneumoniae, and P. mirabilis     tigecycline Value in next row        >=64 RESISTANTCefazolin sensitivity results can be used to predict the effectiveness of oral cephalosporins (eg. Cephalexin) in uncomplicated Urinary Tract Infections due to E. coli, K. pneumoniae, and P. mirabilis     tobramycin Value in next row Sensitive       >=64 RESISTANTCefazolin sensitivity results can be used to predict the effectiveness of oral cephalosporins (eg. Cephalexin) in uncomplicated Urinary Tract Infections due to E. coli, K. pneumoniae, and P. mirabilis     trimethoprim-sulfamethoxazole Value in next row Resistant       >=64 RESISTANTCefazolin sensitivity results can be used to predict the effectiveness of oral cephalosporins (eg. Cephalexin) in uncomplicated Urinary Tract Infections due to E. coli, K. pneumoniae, and P. mirabilis     piperacillin-tazobactam Value in next row Resistant       >=64 RESISTANTCefazolin sensitivity results can be used to predict the effectiveness of oral cephalosporins (eg.  Cephalexin) in uncomplicated Urinary Tract Infections due to E. coli, K. pneumoniae, and P. mirabilis   Basic Metabolic Panel   Result Value Ref Range    Glucose 105 (H) 70 - 99 mg/dL    BUN 42 (H) 8 - 23 mg/dL    CREATININE 3.02 (H) 0.50 - 0.90 mg/dL    Bun/Cre Ratio 14 9 - 20    Calcium 13.5 (HH) 8.6 - 10.4 mg/dL    Sodium 142 135 - 144 mmol/L    Potassium 4.0 3.7 - 5.3 mmol/L    Chloride 96 (L) 98 - 107 mmol/L    CO2 34 (H) 20 - 31 mmol/L    Anion Gap 12 9 - 17 mmol/L    GFR Non-African American 15 (L) >60 mL/min    GFR  19 (L) >60 mL/min    GFR Comment          GFR Staging NOT REPORTED    Urinalysis Reflex to Culture   Result Value Ref Range    Color, UA YELLOW YEL    Turbidity UA CLOUDY (A) CLEAR    Glucose, Ur NEGATIVE NEG    Bilirubin Urine NEGATIVE NEG    Ketones, Urine NEGATIVE NEG    Specific Gravity, UA 1.010 1.005 - 1.030    Urine Hgb 3+ (A) NEG    pH, UA 5.5 5.0 - 8.0    Protein, UA NEGATIVE NEG    Urobilinogen, Urine Normal NORM    Nitrite, Urine POSITIVE (A) NEG    Leukocyte Esterase, Urine MOD (A) NEG    Urinalysis Comments NOT REPORTED    Lactic Acid   Result Value Ref Range    Lactic Acid 0.8 0.5 - 2.2 mmol/L   Microscopic Urinalysis   Result Value Ref Range    -          WBC, UA 20 TO 50 0 - 5 /HPF    RBC, UA 10 TO 20 0 - 2 /HPF    Casts UA 2 TO 5 /LPF    Crystals UA NOT REPORTED NONE /HPF    Epithelial Cells UA 0 TO 2 0 - 5 /HPF    Renal Epithelial, Urine NOT REPORTED 0 /HPF    Bacteria, UA MANY (A) NONE    Mucus, UA NOT REPORTED NONE    Trichomonas, UA NOT REPORTED NONE    Amorphous, UA NOT REPORTED NONE    Other Observations UA NOT REPORTED NREQ    Yeast, UA NOT REPORTED NONE   SPECIMEN REJECTION   Result Value Ref Range    Specimen Source . BLOOD     Ordered Test CBC DIFF     Reason for Rejection Unable to perform testing: Specimen clotted.      - NOT REPORTED    CBC Auto Differential   Result Value Ref Range    WBC 7.6 3.5 - 11.0 k/uL    RBC 2.94 (L) 4.0 - 5.2 m/uL    Hemoglobin 9.6 (L) 12.0 - 16.0 g/dL    Hematocrit 28.4 (L) 36 - 46 %    MCV 96.7 80 - 100 fL    MCH 32.7 26 - 34 pg    MCHC 33.9 31 - 37 g/dL    RDW 14.9 (H) 11.5 - 14.5 %    Platelets 696 (L) 489 - 400 k/uL    MPV 8.4 6.0 - 12.0 fL    NRBC Automated NOT REPORTED per 100 WBC    Differential Type NOT REPORTED     Immature Granulocytes NOT REPORTED 0 %    Absolute Immature Granulocyte NOT REPORTED 0.00 - 0.30 k/uL    WBC Morphology NOT REPORTED     RBC Morphology NOT REPORTED     Platelet Estimate NOT REPORTED     Seg Neutrophils 76 (H) 36 - 66 %    Lymphocytes 16 (L) 24 - 44 %    Monocytes 7 1 - 7 %    Eosinophils % 1 1 - 4 %    Basophils 0 %    Segs Absolute 5.77 1.8 - 7.7 k/uL    Absolute Lymph # 1.22 1.0 - 4.8 k/uL    Absolute Mono # 0.53 0.2 - 0.8 k/uL    Absolute Eos # 0.08 0.0 - 0.4 k/uL    Basophils # 0.00 0.0 - 0.2 k/uL    Morphology HYPOCHROMIA PRESENT    Vitamin D 25 Hydroxy   Result Value Ref Range    Vit D, 25-Hydroxy 55.7 30.0 - 100.0 ng/mL   Electrophoresis Protein, Serum without Reflex to Immunofixation   Result Value Ref Range    Total Protein 6.0 (L) 6.4 - 8.3 g/dL    Albumin (calculated) 4.1 3.2 - 5.2 g/dL    Albumin % 68 (H) 45 - 65 %    Alpha-1-Globulin 0.2 0.1 - 0.4 g/dL    Alpha 1 % 3 3 - 6 %    Alpha-2-Globulin 0.6 0.5 - 0.9 g/dL    Alpha 2 % 11 6 - 13 %    Beta Globulin 0.5 0.5 - 1.1 g/dL    Beta Percent 9 (L) 11 - 19 %    Gamma Globulin 0.6 0.5 - 1.5 g/dL    Gamma Globulin % 10 9 - 20 %    Total Prot. Sum 6.0 (L) 6.3 - 8.2 g/dL    Total Prot. Sum,% 101 98 - 102 %    Protein Electrophoresis, Serum       A ZONE OF RESTRICTION IS PRESENT IN THE GAMMAGLOBULIN REGION. CONFIRMED BY    Pathologist ELECTRONICALLY SIGNED. Kathe Brennan M.D.     Comprehensive Metabolic Panel w/ Reflex to MG   Result Value Ref Range    Glucose 92 70 - 99 mg/dL    BUN 37 (H) 8 - 23 mg/dL    CREATININE 3.01 (H) 0.50 - 0.90 mg/dL    Bun/Cre Ratio 12 9 - 20    Calcium 11.9 (H) 8.6 - 10.4 mg/dL    Sodium 146 (H) 135 - 144 mmol/L    Potassium 3.4 (L) 3.7 - 5.3 mmol/L    Chloride 102 98 - 107 mmol/L    CO2 34 (H) 20 - 31 mmol/L    Anion Gap 10 9 - 17 mmol/L    Alkaline Phosphatase 42 35 - 104 U/L    ALT <5 (L) 5 - 33 U/L    AST 14 <32 U/L    Total Bilirubin 0.16 (L) 0.3 - 1.2 mg/dL    Total Protein 6.4 6.4 - 8.3 g/dL    Alb 3.9 3.5 - 5.2 g/dL (H) 0.26 - 1.65   Immunoglobulin Panel (IgG, IgA, IgM)   Result Value Ref Range    IgG 576 (L) 700 - 1600 mg/dL    IgA 102 70 - 400 mg/dL    IgM 148 40 - 230 mg/dL   Basic Metabolic Panel   Result Value Ref Range    Glucose 103 (H) 70 - 99 mg/dL    BUN 27 (H) 8 - 23 mg/dL    CREATININE 2.44 (H) 0.50 - 0.90 mg/dL    Bun/Cre Ratio 11 9 - 20    Calcium 10.0 8.6 - 10.4 mg/dL    Sodium 142 135 - 144 mmol/L    Potassium 3.9 3.7 - 5.3 mmol/L    Chloride 100 98 - 107 mmol/L    CO2 31 20 - 31 mmol/L    Anion Gap 11 9 - 17 mmol/L    GFR Non-African American 20 (L) >60 mL/min    GFR  24 (L) >60 mL/min    GFR Comment          GFR Staging NOT REPORTED    CBC Auto Differential   Result Value Ref Range    WBC 7.2 3.5 - 11.0 k/uL    RBC 2.80 (L) 4.0 - 5.2 m/uL    Hemoglobin 8.8 (L) 12.0 - 16.0 g/dL    Hematocrit 27.7 (L) 36 - 46 %    MCV 99.0 80 - 100 fL    MCH 31.3 26 - 34 pg    MCHC 31.6 31 - 37 g/dL    RDW 15.2 (H) 11.5 - 14.5 %    Platelets 950 (L) 534 - 400 k/uL    MPV NOT REPORTED 6.0 - 12.0 fL    NRBC Automated NOT REPORTED per 100 WBC    Differential Type NOT REPORTED     Immature Granulocytes NOT REPORTED 0 %    Absolute Immature Granulocyte NOT REPORTED 0.00 - 0.30 k/uL    WBC Morphology NOT REPORTED     RBC Morphology NOT REPORTED     Platelet Estimate NOT REPORTED     Seg Neutrophils 69 (H) 36 - 66 %    Lymphocytes 18 (L) 24 - 44 %    Monocytes 8 (H) 1 - 7 %    Eosinophils % 4 1 - 4 %    Basophils 1 0 - 2 %    Segs Absolute 5.00 1.8 - 7.7 k/uL    Absolute Lymph # 1.30 1.0 - 4.8 k/uL    Absolute Mono # 0.50 0.2 - 0.8 k/uL    Absolute Eos # 0.30 0.0 - 0.4 k/uL    Basophils # 0.10 0.0 - 0.2 k/uL   Basic Metabolic Panel   Result Value Ref Range    Glucose 95 70 - 99 mg/dL    BUN 27 (H) 8 - 23 mg/dL    CREATININE 2.65 (H) 0.50 - 0.90 mg/dL    Bun/Cre Ratio 10 9 - 20    Calcium 9.7 8.6 - 10.4 mg/dL    Sodium 141 135 - 144 mmol/L    Potassium 3.9 3.7 - 5.3 mmol/L    Chloride 99 98 - 107 mmol/L    CO2 31 20 - 31

## 2018-12-02 NOTE — PROGRESS NOTES
nightly   aspirin 81 MG tablet, Take 81 mg by mouth daily   rOPINIRole (REQUIP) 2 MG tablet, Take 2 mg by mouth 3 times daily  pantoprazole (PROTONIX) 40 MG tablet, Take 1 tablet by mouth 2 times daily (before meals)  BREO ELLIPTA 100-25 MCG/INH AEPB inhaler, Inhale 1 puff into the lungs daily  ferrous sulfate 325 (65 Fe) MG EC tablet, Take 1 tablet by mouth 2 times daily (with meals)  vitamin D (CHOLECALCIFEROL) 5000 units CAPS capsule, Take 5,000 Units by mouth daily  rasagiline mesylate 1 MG TABS, Take 1 tablet by mouth daily  ONETOUCH VERIO strip, 1 each by In Vitro route daily As needed. ONETOUCH DELICA LANCETS 80X MISC, 1 Units by Does not apply route 2 times daily  SPIRIVA RESPIMAT 2.5 MCG/ACT AERS inhaler, Inhale 2 puffs into the lungs daily  Oxygen Concentrator, Dx: Pneumonia, use as directed. (Patient taking differently: Dx: Pneumonia, use as directed. ON 24/7)    INVESTIGATIONS     Last 3 CMP:    Recent Labs      11/30/18   1102  12/01/18   0855  12/02/18   0900   NA  142  141  140   K  3.9  3.9  4.2   CL  100  99  98   CO2  31  31  32*   BUN  27*  27*  24*   CREATININE  2.44*  2.65*  2.05*   CALCIUM  10.0  9.7  9.4       Last 3 CBC:  Recent Labs      11/30/18   1102   WBC  7.2   RBC  2.80*   HGB  8.8*   HCT  27.7*   MCV  99.0   MCH  31.3   MCHC  31.6   RDW  15.2*   PLT  106*   MPV  NOT REPORTED       ASSESSMENT     1. Acute kidney injury nonoliguric secondary to hypercalcemia, paraproteinemia  2. Hypercalcemia secondary to volume depletion/Rocaltrol use - improved  3. 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 She had this problem since childhood. She used to follow up with a doctor family and the  patient does not know the Specialty, and the doctor has passed away now. 4.  Chronic kidney disease stage III B secondary to nephrosclerosis with baseline creatinine 1.8-2. Dr Libertad Eli  5. Complicated  Urinary tract infection E.  Coli -

## 2018-12-02 NOTE — PROGRESS NOTES
rOPINIRole  2 mg Oral TID     Thank you for allowing us to participate in the care of this patient. Please call with questions. [facility]  Magnolia Aaron MD    Perfect [employer] messaging  OFFICE: [phone]  CELL:     [phone]    Electronically signed by Justin Wilburn MD on 12/2/2018 at 9:35 AM    This note iscreated with the assistance of a speech recognition program.  While intending to generate a document that actually reflects the content of the visit, the document can still have some errors including those of syntax andsound a like substitutions which may escape proof reading. It such instances, actual meaning can be extrapolated by contextual diversion.

## 2018-12-03 LAB
ABSOLUTE EOS #: 0.4 K/UL (ref 0–0.4)
ABSOLUTE IMMATURE GRANULOCYTE: ABNORMAL K/UL (ref 0–0.3)
ABSOLUTE LYMPH #: 1.06 K/UL (ref 1–4.8)
ABSOLUTE MONO #: 0.59 K/UL (ref 0.2–0.8)
ANION GAP SERPL CALCULATED.3IONS-SCNC: 13 MMOL/L (ref 9–17)
BASOPHILS # BLD: 1 %
BASOPHILS ABSOLUTE: 0.07 K/UL (ref 0–0.2)
BUN BLDV-MCNC: 23 MG/DL (ref 8–23)
BUN/CREAT BLD: 12 (ref 9–20)
CALCIUM SERPL-MCNC: 9.2 MG/DL (ref 8.6–10.4)
CHLORIDE BLD-SCNC: 97 MMOL/L (ref 98–107)
CO2: 29 MMOL/L (ref 20–31)
CREAT SERPL-MCNC: 1.85 MG/DL (ref 0.5–0.9)
CULTURE: NORMAL
CULTURE: NORMAL
DIFFERENTIAL TYPE: ABNORMAL
EOSINOPHILS RELATIVE PERCENT: 6 % (ref 1–4)
ESTIMATED AVERAGE GLUCOSE: 108 MG/DL
GFR AFRICAN AMERICAN: 33 ML/MIN
GFR NON-AFRICAN AMERICAN: 27 ML/MIN
GFR SERPL CREATININE-BSD FRML MDRD: ABNORMAL ML/MIN/{1.73_M2}
GFR SERPL CREATININE-BSD FRML MDRD: ABNORMAL ML/MIN/{1.73_M2}
GLUCOSE BLD-MCNC: 103 MG/DL (ref 65–105)
GLUCOSE BLD-MCNC: 120 MG/DL (ref 65–105)
GLUCOSE BLD-MCNC: 125 MG/DL (ref 65–105)
GLUCOSE BLD-MCNC: 96 MG/DL (ref 65–105)
GLUCOSE BLD-MCNC: 99 MG/DL (ref 70–99)
HBA1C MFR BLD: 5.4 % (ref 4–6)
HCT VFR BLD CALC: 25.8 % (ref 36–46)
HEMOGLOBIN: 8.6 G/DL (ref 12–16)
IMMATURE GRANULOCYTES: ABNORMAL %
LYMPHOCYTES # BLD: 16 % (ref 24–44)
Lab: NORMAL
Lab: NORMAL
MAGNESIUM: 1.8 MG/DL (ref 1.6–2.6)
MCH RBC QN AUTO: 32.5 PG (ref 26–34)
MCHC RBC AUTO-ENTMCNC: 33.4 G/DL (ref 31–37)
MCV RBC AUTO: 97.3 FL (ref 80–100)
MONOCYTES # BLD: 9 % (ref 1–7)
MORPHOLOGY: ABNORMAL
NRBC AUTOMATED: ABNORMAL PER 100 WBC
PDW BLD-RTO: 16 % (ref 11.5–14.5)
PLATELET # BLD: 101 K/UL (ref 130–400)
PLATELET ESTIMATE: ABNORMAL
PMV BLD AUTO: 8.3 FL (ref 6–12)
POTASSIUM SERPL-SCNC: 4.4 MMOL/L (ref 3.7–5.3)
RBC # BLD: 2.65 M/UL (ref 4–5.2)
RBC # BLD: ABNORMAL 10*6/UL
SEG NEUTROPHILS: 68 % (ref 36–66)
SEGMENTED NEUTROPHILS ABSOLUTE COUNT: 4.48 K/UL (ref 1.8–7.7)
SODIUM BLD-SCNC: 139 MMOL/L (ref 135–144)
SPECIMEN DESCRIPTION: NORMAL
SPECIMEN DESCRIPTION: NORMAL
STATUS: NORMAL
STATUS: NORMAL
WBC # BLD: 6.6 K/UL (ref 3.5–11)
WBC # BLD: ABNORMAL 10*3/UL

## 2018-12-03 PROCEDURE — 2580000003 HC RX 258: Performed by: INTERNAL MEDICINE

## 2018-12-03 PROCEDURE — 6370000000 HC RX 637 (ALT 250 FOR IP): Performed by: FAMILY MEDICINE

## 2018-12-03 PROCEDURE — 97530 THERAPEUTIC ACTIVITIES: CPT

## 2018-12-03 PROCEDURE — 97110 THERAPEUTIC EXERCISES: CPT

## 2018-12-03 PROCEDURE — 99232 SBSQ HOSP IP/OBS MODERATE 35: CPT | Performed by: INTERNAL MEDICINE

## 2018-12-03 PROCEDURE — 97116 GAIT TRAINING THERAPY: CPT

## 2018-12-03 PROCEDURE — 2700000000 HC OXYGEN THERAPY PER DAY

## 2018-12-03 PROCEDURE — 36415 COLL VENOUS BLD VENIPUNCTURE: CPT

## 2018-12-03 PROCEDURE — 1200000000 HC SEMI PRIVATE

## 2018-12-03 PROCEDURE — 94640 AIRWAY INHALATION TREATMENT: CPT

## 2018-12-03 PROCEDURE — 97530 THERAPEUTIC ACTIVITIES: CPT | Performed by: NURSE PRACTITIONER

## 2018-12-03 PROCEDURE — 80048 BASIC METABOLIC PNL TOTAL CA: CPT

## 2018-12-03 PROCEDURE — 82947 ASSAY GLUCOSE BLOOD QUANT: CPT

## 2018-12-03 PROCEDURE — 6370000000 HC RX 637 (ALT 250 FOR IP): Performed by: INTERNAL MEDICINE

## 2018-12-03 PROCEDURE — 2580000003 HC RX 258: Performed by: FAMILY MEDICINE

## 2018-12-03 PROCEDURE — 85025 COMPLETE CBC W/AUTO DIFF WBC: CPT

## 2018-12-03 PROCEDURE — 94760 N-INVAS EAR/PLS OXIMETRY 1: CPT

## 2018-12-03 PROCEDURE — 6360000002 HC RX W HCPCS: Performed by: INTERNAL MEDICINE

## 2018-12-03 PROCEDURE — 83735 ASSAY OF MAGNESIUM: CPT

## 2018-12-03 RX ADMIN — MEROPENEM 500 MG: 500 INJECTION, POWDER, FOR SOLUTION INTRAVENOUS at 00:00

## 2018-12-03 RX ADMIN — IPRATROPIUM BROMIDE AND ALBUTEROL SULFATE 3 ML: .5; 3 SOLUTION RESPIRATORY (INHALATION) at 09:57

## 2018-12-03 RX ADMIN — METOPROLOL TARTRATE 12.5 MG: 25 TABLET ORAL at 09:11

## 2018-12-03 RX ADMIN — ROPINIROLE HYDROCHLORIDE 2 MG: 2 TABLET, FILM COATED ORAL at 20:22

## 2018-12-03 RX ADMIN — CARBIDOPA AND LEVODOPA 1 TABLET: 25; 100 TABLET, EXTENDED RELEASE ORAL at 20:22

## 2018-12-03 RX ADMIN — CARBIDOPA AND LEVODOPA 1 TABLET: 25; 100 TABLET, EXTENDED RELEASE ORAL at 09:12

## 2018-12-03 RX ADMIN — MELATONIN TAB 3 MG 3 MG: 3 TAB at 00:07

## 2018-12-03 RX ADMIN — ASPIRIN 81 MG: 81 TABLET, COATED ORAL at 09:11

## 2018-12-03 RX ADMIN — HYDROCODONE BITARTRATE AND ACETAMINOPHEN 1 TABLET: 5; 325 TABLET ORAL at 20:22

## 2018-12-03 RX ADMIN — APIXABAN 5 MG: 5 TABLET, FILM COATED ORAL at 20:22

## 2018-12-03 RX ADMIN — Medication 2 PUFF: at 09:59

## 2018-12-03 RX ADMIN — Medication 10 ML: at 09:10

## 2018-12-03 RX ADMIN — METOPROLOL TARTRATE 12.5 MG: 25 TABLET ORAL at 20:22

## 2018-12-03 RX ADMIN — RASAGILINE 1 MG: 0.5 TABLET ORAL at 09:11

## 2018-12-03 RX ADMIN — CARBIDOPA AND LEVODOPA 1 TABLET: 25; 100 TABLET, EXTENDED RELEASE ORAL at 14:17

## 2018-12-03 RX ADMIN — POTASSIUM CHLORIDE 20 MEQ: 40 SOLUTION ORAL at 09:12

## 2018-12-03 RX ADMIN — POLYVINYL ALCOHOL 1 DROP: 14 SOLUTION/ DROPS OPHTHALMIC at 06:25

## 2018-12-03 RX ADMIN — MEROPENEM 1 G: 1 INJECTION, POWDER, FOR SOLUTION INTRAVENOUS at 23:55

## 2018-12-03 RX ADMIN — Medication 2 PUFF: at 22:00

## 2018-12-03 RX ADMIN — CARBIDOPA AND LEVODOPA 1 TABLET: 25; 100 TABLET, EXTENDED RELEASE ORAL at 16:34

## 2018-12-03 RX ADMIN — APIXABAN 5 MG: 5 TABLET, FILM COATED ORAL at 09:11

## 2018-12-03 RX ADMIN — SPIRONOLACTONE 25 MG: 25 TABLET ORAL at 09:12

## 2018-12-03 RX ADMIN — ROPINIROLE HYDROCHLORIDE 2 MG: 2 TABLET, FILM COATED ORAL at 14:17

## 2018-12-03 RX ADMIN — IPRATROPIUM BROMIDE AND ALBUTEROL SULFATE 3 ML: .5; 3 SOLUTION RESPIRATORY (INHALATION) at 22:00

## 2018-12-03 RX ADMIN — MEROPENEM 500 MG: 500 INJECTION, POWDER, FOR SOLUTION INTRAVENOUS at 09:10

## 2018-12-03 RX ADMIN — Medication 10 ML: at 23:55

## 2018-12-03 RX ADMIN — IPRATROPIUM BROMIDE AND ALBUTEROL SULFATE 3 ML: .5; 3 SOLUTION RESPIRATORY (INHALATION) at 14:59

## 2018-12-03 RX ADMIN — ALPRAZOLAM 0.25 MG: 0.25 TABLET ORAL at 19:23

## 2018-12-03 RX ADMIN — ROPINIROLE HYDROCHLORIDE 2 MG: 2 TABLET, FILM COATED ORAL at 09:11

## 2018-12-03 RX ADMIN — AMIODARONE HYDROCHLORIDE 200 MG: 200 TABLET ORAL at 09:12

## 2018-12-03 RX ADMIN — LINAGLIPTIN 2.5 MG: 5 TABLET, FILM COATED ORAL at 09:12

## 2018-12-03 RX ADMIN — ATORVASTATIN CALCIUM 20 MG: 20 TABLET, FILM COATED ORAL at 09:12

## 2018-12-03 RX ADMIN — DOCUSATE SODIUM 100 MG: 100 CAPSULE, LIQUID FILLED ORAL at 09:12

## 2018-12-03 ASSESSMENT — PAIN DESCRIPTION - ORIENTATION: ORIENTATION: RIGHT;LEFT;MID

## 2018-12-03 ASSESSMENT — PAIN SCALES - GENERAL
PAINLEVEL_OUTOF10: 10
PAINLEVEL_OUTOF10: 10

## 2018-12-03 ASSESSMENT — PAIN DESCRIPTION - PROGRESSION: CLINICAL_PROGRESSION: GRADUALLY IMPROVING

## 2018-12-03 ASSESSMENT — ENCOUNTER SYMPTOMS
GASTROINTESTINAL NEGATIVE: 1
RESPIRATORY NEGATIVE: 1

## 2018-12-03 ASSESSMENT — PAIN DESCRIPTION - DESCRIPTORS: DESCRIPTORS: ACHING

## 2018-12-03 ASSESSMENT — PAIN DESCRIPTION - LOCATION: LOCATION: GENERALIZED

## 2018-12-03 ASSESSMENT — PAIN DESCRIPTION - FREQUENCY: FREQUENCY: CONTINUOUS

## 2018-12-03 ASSESSMENT — PAIN DESCRIPTION - PAIN TYPE: TYPE: CHRONIC PAIN

## 2018-12-03 ASSESSMENT — PAIN DESCRIPTION - ONSET: ONSET: UNABLE TO TELL

## 2018-12-03 NOTE — PROGRESS NOTES
Writer contacted attending physician regarding urine assessment. Noted blood in urine (in commode). Not a large amount (small amount). No clots noted. No new orders at this time.

## 2018-12-03 NOTE — PROGRESS NOTES
endoscopy (08/29/2018); and Upper gastrointestinal endoscopy (N/A, 8/29/2018). Restrictions  Restrictions/Precautions  Restrictions/Precautions: General Precautions, Fall Risk, Contact Precautions, Up as Tolerated  Position Activity Restriction  Other position/activity restrictions: Up with assist, telemetry, RUE IV  Subjective   General  Chart Reviewed: Yes  Subjective  Subjective: RNJose Francisco reports patient is medically stable for PT treatment. General Comment  Comments: Patient is agreeable for PT treatment. Pain Screening  Patient Currently in Pain: Yes  Pain Assessment  Pain Assessment: 0-10  Pain Level: 10  Vital Signs  Patient Currently in Pain: Yes  Oxygen Therapy  O2 Device: Nasal cannula  O2 Flow Rate (L/min): 2 L/min       Objective      Transfers  Sit to Stand: Minimal Assistance  Stand to sit: Minimal Assistance  Ambulation  Ambulation?: Yes  Ambulation 1  Surface: level tile  Device: Rolling Walker  Other Apparatus: O2  Assistance: Contact guard assistance  Quality of Gait: wide NANCY, decreased awareness of objects in path way. Distance: 30' x 1  Comments: Patient reports at home she has only been getting up to the bathroom. Balance  Posture: Fair  Sitting - Static: Fair;+  Sitting - Dynamic: Fair;+  Standing - Static: Fair;-  Standing - Dynamic: Fair;-  Exercises  Comments: Seated arden LE AROM x 15 reps with 2 rest breaks due SOB. Then standing AROM x 5-7 reps with RW for UE support and min A for balance during single leg stance. Assessment   Body structures, Functions, Activity limitations: Decreased functional mobility ; Decreased ADL status; Decreased strength;Decreased safe awareness;Decreased endurance;Decreased balance  Assessment: LUIS found writer to explain that pt's  would like patient to go to [facility], educated patient why that would not be appropriate since she is close to her baseline but could benefit from safety, gait and endurance training but would not tolerate 3

## 2018-12-03 NOTE — PROGRESS NOTES
Exceptions  Following Commands: Follows one step commands with repetition  Attention Span: Difficulty dividing attention  Memory: Decreased recall of recent events;Decreased short term memory  Safety Judgement: Decreased awareness of need for assistance;Decreased awareness of need for safety  Problem Solving: Assistance required to generate solutions;Assistance required to implement solutions;Decreased awareness of errors;Assistance required to correct errors made  Insights: Decreased awareness of deficits  Initiation: Requires cues for some  Sequencing: Requires cues for some     Perception  Overall Perceptual Status: [facility]        Additional Activities Comment  Additional Activities:      Upon writer exit, call light within reach, pt retired to chair. All lines intact and patient positioned comfortably. All patient needs addressed prior to ending therapy session. Chart reviewed prior to treatment and patient is agreeable for therapy. RN reports patient is medically stable for therapy treatment this date. Assessment   Performance deficits / Impairments: Decreased functional mobility ; Decreased ADL status; Decreased strength;Decreased safe awareness;Decreased endurance;Decreased balance  Prognosis: Fair  Patient Education:  Education provided on OT POC including goals, treatment interventions, and discharge plan/recommendations. Patient, Family and Caregiver verbalize and/or demonstrate fair engagement and understanding of edu provided. REQUIRES OT FOLLOW UP: Yes  Activity Tolerance  Activity Tolerance: Patient Tolerated treatment well  Safety Devices  Safety Devices in place: Yes  Type of devices: Call light within reach; Left in chair;Patient at risk for falls;Nurse notified;Gait belt; All fall risk precautions in place          Plan   Plan  Times per week: 4x/week  Current Treatment Recommendations: Strengthening, Balance Training, Functional Mobility Training, Endurance Training, Safety

## 2018-12-03 NOTE — PROGRESS NOTES
in next row Sensitive       >=64 RESISTANTCefazolin sensitivity results can be used to predict the effectiveness of oral cephalosporins (eg. Cephalexin) in uncomplicated Urinary Tract Infections due to E. coli, K. pneumoniae, and P. mirabilis     nitrofurantoin Value in next row Sensitive       >=64 RESISTANTCefazolin sensitivity results can be used to predict the effectiveness of oral cephalosporins (eg. Cephalexin) in uncomplicated Urinary Tract Infections due to E. coli, K. pneumoniae, and P. mirabilis     tigecycline Value in next row        >=64 RESISTANTCefazolin sensitivity results can be used to predict the effectiveness of oral cephalosporins (eg. Cephalexin) in uncomplicated Urinary Tract Infections due to E. coli, K. pneumoniae, and P. mirabilis     tobramycin Value in next row Sensitive       >=64 RESISTANTCefazolin sensitivity results can be used to predict the effectiveness of oral cephalosporins (eg. Cephalexin) in uncomplicated Urinary Tract Infections due to E. coli, K. pneumoniae, and P. mirabilis     trimethoprim-sulfamethoxazole Value in next row Resistant       >=64 RESISTANTCefazolin sensitivity results can be used to predict the effectiveness of oral cephalosporins (eg. Cephalexin) in uncomplicated Urinary Tract Infections due to E. coli, K. pneumoniae, and P. mirabilis     piperacillin-tazobactam Value in next row Resistant       >=64 RESISTANTCefazolin sensitivity results can be used to predict the effectiveness of oral cephalosporins (eg.  Cephalexin) in uncomplicated Urinary Tract Infections due to E. coli, K. pneumoniae, and P. mirabilis   Basic Metabolic Panel   Result Value Ref Range    Glucose 105 (H) 70 - 99 mg/dL    BUN 42 (H) 8 - 23 mg/dL    CREATININE 3.02 (H) 0.50 - 0.90 mg/dL    Bun/Cre Ratio 14 9 - 20    Calcium 13.5 (HH) 8.6 - 10.4 mg/dL    Sodium 142 135 - 144 mmol/L    Potassium 4.0 3.7 - 5.3 mmol/L    Chloride 96 (L) 98 - 107 mmol/L    CO2 34 (H) 20 - 31 mmol/L    Anion Gap 12 9 - 17 mmol/L    GFR Non-African American 15 (L) >60 mL/min    GFR  19 (L) >60 mL/min    GFR Comment          GFR Staging NOT REPORTED    Urinalysis Reflex to Culture   Result Value Ref Range    Color, UA YELLOW YEL    Turbidity UA CLOUDY (A) CLEAR    Glucose, Ur NEGATIVE NEG    Bilirubin Urine NEGATIVE NEG    Ketones, Urine NEGATIVE NEG    Specific Gravity, UA 1.010 1.005 - 1.030    Urine Hgb 3+ (A) NEG    pH, UA 5.5 5.0 - 8.0    Protein, UA NEGATIVE NEG    Urobilinogen, Urine Normal NORM    Nitrite, Urine POSITIVE (A) NEG    Leukocyte Esterase, Urine MOD (A) NEG    Urinalysis Comments NOT REPORTED    Lactic Acid   Result Value Ref Range    Lactic Acid 0.8 0.5 - 2.2 mmol/L   Microscopic Urinalysis   Result Value Ref Range    -          WBC, UA 20 TO 50 0 - 5 /HPF    RBC, UA 10 TO 20 0 - 2 /HPF    Casts UA 2 TO 5 /LPF    Crystals UA NOT REPORTED NONE /HPF    Epithelial Cells UA 0 TO 2 0 - 5 /HPF    Renal Epithelial, Urine NOT REPORTED 0 /HPF    Bacteria, UA MANY (A) NONE    Mucus, UA NOT REPORTED NONE    Trichomonas, UA NOT REPORTED NONE    Amorphous, UA NOT REPORTED NONE    Other Observations UA NOT REPORTED NREQ    Yeast, UA NOT REPORTED NONE   SPECIMEN REJECTION   Result Value Ref Range    Specimen Source . BLOOD     Ordered Test CBC DIFF     Reason for Rejection Unable to perform testing: Specimen clotted.      - NOT REPORTED    CBC Auto Differential   Result Value Ref Range    WBC 7.6 3.5 - 11.0 k/uL    RBC 2.94 (L) 4.0 - 5.2 m/uL    Hemoglobin 9.6 (L) 12.0 - 16.0 g/dL    Hematocrit 28.4 (L) 36 - 46 %    MCV 96.7 80 - 100 fL    MCH 32.7 26 - 34 pg    MCHC 33.9 31 - 37 g/dL    RDW 14.9 (H) 11.5 - 14.5 %    Platelets 012 (L) 125 - 400 k/uL    MPV 8.4 6.0 - 12.0 fL    NRBC Automated NOT REPORTED per 100 WBC    Differential Type NOT REPORTED     Immature Granulocytes NOT REPORTED 0 %    Absolute Immature Granulocyte NOT REPORTED 0.00 - 0.30 k/uL    WBC Morphology NOT REPORTED     RBC Morphology NOT IgM 148 40 - 230 mg/dL   Basic Metabolic Panel   Result Value Ref Range    Glucose 103 (H) 70 - 99 mg/dL    BUN 27 (H) 8 - 23 mg/dL    CREATININE 2.44 (H) 0.50 - 0.90 mg/dL    Bun/Cre Ratio 11 9 - 20    Calcium 10.0 8.6 - 10.4 mg/dL    Sodium 142 135 - 144 mmol/L    Potassium 3.9 3.7 - 5.3 mmol/L    Chloride 100 98 - 107 mmol/L    CO2 31 20 - 31 mmol/L    Anion Gap 11 9 - 17 mmol/L    GFR Non-African American 20 (L) >60 mL/min    GFR  24 (L) >60 mL/min    GFR Comment          GFR Staging NOT REPORTED    CBC Auto Differential   Result Value Ref Range    WBC 7.2 3.5 - 11.0 k/uL    RBC 2.80 (L) 4.0 - 5.2 m/uL    Hemoglobin 8.8 (L) 12.0 - 16.0 g/dL    Hematocrit 27.7 (L) 36 - 46 %    MCV 99.0 80 - 100 fL    MCH 31.3 26 - 34 pg    MCHC 31.6 31 - 37 g/dL    RDW 15.2 (H) 11.5 - 14.5 %    Platelets 475 (L) 764 - 400 k/uL    MPV NOT REPORTED 6.0 - 12.0 fL    NRBC Automated NOT REPORTED per 100 WBC    Differential Type NOT REPORTED     Immature Granulocytes NOT REPORTED 0 %    Absolute Immature Granulocyte NOT REPORTED 0.00 - 0.30 k/uL    WBC Morphology NOT REPORTED     RBC Morphology NOT REPORTED     Platelet Estimate NOT REPORTED     Seg Neutrophils 69 (H) 36 - 66 %    Lymphocytes 18 (L) 24 - 44 %    Monocytes 8 (H) 1 - 7 %    Eosinophils % 4 1 - 4 %    Basophils 1 0 - 2 %    Segs Absolute 5.00 1.8 - 7.7 k/uL    Absolute Lymph # 1.30 1.0 - 4.8 k/uL    Absolute Mono # 0.50 0.2 - 0.8 k/uL    Absolute Eos # 0.30 0.0 - 0.4 k/uL    Basophils # 0.10 0.0 - 0.2 k/uL   Basic Metabolic Panel   Result Value Ref Range    Glucose 95 70 - 99 mg/dL    BUN 27 (H) 8 - 23 mg/dL    CREATININE 2.65 (H) 0.50 - 0.90 mg/dL    Bun/Cre Ratio 10 9 - 20    Calcium 9.7 8.6 - 10.4 mg/dL    Sodium 141 135 - 144 mmol/L    Potassium 3.9 3.7 - 5.3 mmol/L    Chloride 99 98 - 107 mmol/L    CO2 31 20 - 31 mmol/L    Anion Gap 11 9 - 17 mmol/L    GFR Non-African American 18 (L) >60 mL/min    GFR  22 (L) >60 mL/min    GFR Comment          GFR Staging NOT REPORTED    Basic Metabolic Panel   Result Value Ref Range    Glucose 130 (H) 70 - 99 mg/dL    BUN 24 (H) 8 - 23 mg/dL    CREATININE 2.05 (H) 0.50 - 0.90 mg/dL    Bun/Cre Ratio 12 9 - 20    Calcium 9.4 8.6 - 10.4 mg/dL    Sodium 140 135 - 144 mmol/L    Potassium 4.2 3.7 - 5.3 mmol/L    Chloride 98 98 - 107 mmol/L    CO2 32 (H) 20 - 31 mmol/L    Anion Gap 10 9 - 17 mmol/L    GFR Non-African American 24 (L) >60 mL/min    GFR  29 (L) >60 mL/min    GFR Comment          GFR Staging NOT REPORTED    BASIC METABOLIC PANEL   Result Value Ref Range    Glucose 99 70 - 99 mg/dL    BUN 23 8 - 23 mg/dL    CREATININE 1.85 (H) 0.50 - 0.90 mg/dL    Bun/Cre Ratio 12 9 - 20    Calcium 9.2 8.6 - 10.4 mg/dL    Sodium 139 135 - 144 mmol/L    Potassium 4.4 3.7 - 5.3 mmol/L    Chloride 97 (L) 98 - 107 mmol/L    CO2 29 20 - 31 mmol/L    Anion Gap 13 9 - 17 mmol/L    GFR Non-African American 27 (L) >60 mL/min    GFR  33 (L) >60 mL/min    GFR Comment          GFR Staging NOT REPORTED    Magnesium   Result Value Ref Range    Magnesium 1.8 1.6 - 2.6 mg/dL   CBC Auto Differential   Result Value Ref Range    WBC 6.6 3.5 - 11.0 k/uL    RBC 2.65 (L) 4.0 - 5.2 m/uL    Hemoglobin 8.6 (L) 12.0 - 16.0 g/dL    Hematocrit 25.8 (L) 36 - 46 %    MCV 97.3 80 - 100 fL    MCH 32.5 26 - 34 pg    MCHC 33.4 31 - 37 g/dL    RDW 16.0 (H) 11.5 - 14.5 %    Platelets 224 (L) 673 - 400 k/uL    MPV 8.3 6.0 - 12.0 fL    NRBC Automated NOT REPORTED per 100 WBC    Differential Type NOT REPORTED     Immature Granulocytes NOT REPORTED 0 %    Absolute Immature Granulocyte NOT REPORTED 0.00 - 0.30 k/uL    WBC Morphology NOT REPORTED     RBC Morphology NOT REPORTED     Platelet Estimate NOT REPORTED     Seg Neutrophils 68 (H) 36 - 66 %    Lymphocytes 16 (L) 24 - 44 %    Monocytes 9 (H) 1 - 7 %    Eosinophils % 6 (H) 1 - 4 %    Basophils 1 %    Segs Absolute 4.48 1.8 - 7.7 k/uL    Absolute Lymph # 1.06 1.0 - 4.8

## 2018-12-03 NOTE — PROGRESS NOTES
and she does have a history of UTIs in the past.  Lab testing did show some hypocalcemia, acute kidney injury and she was admitted for a UTI started on meropenem and I was asked to evaluate and help with antibiotic choice    Currentevaluation:12/3/2018    BP (!) 154/62   Pulse 58   Temp 98.2 °F (36.8 °C) (Oral)   Resp 18   Ht 5' 1\" (1.549 m)   Wt 184 lb 4.8 oz (83.6 kg)   LMP 2004 (Within Years)   SpO2 97%   BMI 34.82 kg/m²     Temperature Range: Temp: 98.2 °F (36.8 °C) Temp  Av °F (36.7 °C)  Min: 97.3 °F (36.3 °C)  Max: 98.5 °F (36.9 °C)  The patient is seen and evaluated at bedside she is awake and alert in no acute distress. She is sitting in the chair and has family at bedside. She continues to report that she feels terrible. Despite her significant clinical improvement again she has the same complaints. Review of Systems   Constitutional:        She reports feeling \"terrible\"   Respiratory: Negative. Cardiovascular: Negative. Gastrointestinal: Negative. Physical Examination :     Physical Exam   HENT:   Head: Normocephalic and atraumatic. Cardiovascular: Regular rhythm. Murmur heard. Pulmonary/Chest: Effort normal and breath sounds normal.   Abdominal: Soft. Bowel sounds are normal.   Obese abdomen   Musculoskeletal: She exhibits no edema. Neurological: She is alert. Skin: No erythema. Laboratory data:   I have independently reviewed the followinglabs:  CBC with Differential:   Recent Labs      18   0822   WBC  6.6   HGB  8.6*   HCT  25.8*   PLT  101*   LYMPHOPCT  16*   MONOPCT  9*     BMP:   Recent Labs      18   0900  18   0822   NA  140  139   K  4.2  4.4   CL  98  97*   CO2  32*  29   BUN  24*  23   CREATININE  2.05*  1.85*   MG   --   1.8     Hepatic Function Panel:   No results for input(s): PROT, LABALBU, BILIDIR, IBILI, BILITOT, ALKPHOS, ALT, AST in the last 72 hours. No results for input(s): VANCOTROUGH in the last 72 hours.    Lab Results   Component Value Date    CRP 3.1 06/12/2013     Lab Results   Component Value Date    SEDRATE 79 (H) 08/24/2018       Imaging Studies:   RETROPERITONEAL ULTRASOUND OF THE KIDNEYS 11/29/2018  Impression   Findings consistent with medical renal disease.       Bilateral nephrocalcinosis without evidence for obstructive uropathy. Cultures:     Culture Blood #1 [[id number]] Collected: 11/27/18 1000   Order Status: Completed Specimen: Blood Updated: 12/03/18 0030    Specimen Description . BLOOD    Special Requests 9cc lta    Culture NO GROWTH 6 DAYS    Status FINAL 12/03/2018   Culture Blood #1 [[id number]] Collected: 11/27/18 1020   Order Status: Completed Specimen: Blood Updated: 12/03/18 0030    Specimen Description . BLOOD    Special Requests RIGHT ARM, 10ML    Culture NO GROWTH 6 DAYS    Status FINAL 12/03/2018     Urine culture clean catch [[id number]] (Abnormal)  Collected: 11/27/18 1000   Order Status: Completed Updated: 11/28/18 2204    Specimen Description . CLEAN CATCH URINE    Special Requests NOT REPORTED    Culture ESCHERICHIA COLI >[id number] CFU/ML THIS ORGANISM IS AN EXTENDED-SPECTRUM (A)    Culture  BETA-LACTAMASE  AND RESISTANCE TO THERAPY WITH PENICILLINS, (A)    Culture  CEPHALOSPORINS AND AZTREONAM IS EXPECTED.  THESE ORGANISMS GENERALLY REMAIN (A)    Culture  SUSCEPTIBLE TO CARBAPENEMS.  CONSIDER ID CONSULTATION.  (A)    Status FINAL 11/28/2018    Organism EC   ESCHERICHIA COLI     Antibiotic Interpretation ERIN Status   Confirmatory Extended Spectrum Beta-Lactamase Resistant POSITIVE Final   amikacin  NOT REPORTED Final   ampicillin Resistant >=32 RESISTANT Final   ampicillin-sulbactam  NOT REPORTED Final   aztreonam Resistant >=64 RESISTANT Final   ceFAZolin Resistant >=64 RESISTANT Final   cefTRIAXone Resistant >=64 RESISTANT Final   cefepime Resistant 4 RESISTANT Final   ciprofloxacin Resistant >=4 RESISTANT Final   ertapenem  NOT REPORTED Final   gentamicin Sensitive <=1 SUSCEPTIBLE

## 2018-12-03 NOTE — PROGRESS NOTES
[facility]    Progress Note    12/3/2018    7:41 AM    Name:   Edilberto George  MRN:     [id number]     Kimjillian[place]side:      [de-identified]   Room:   2023/2023-01  IP Day:  6  Admit Date:  11/27/2018  9:30 AM    PCP:   Akash Calzada DO  Code Status:  Full Code    Subjective:     C/C:   Chief Complaint   Patient presents with    Abdominal Pain     lower    Flank Pain     bilat    Dysuria     few days     Interval History Status: improved. Patient seen and examined at bedside, no acute events overnight. Feels okay this AM.   Creatinine continues to improve. Denies  shortness of breath, chills, fevers, nausea or vomiting. Awaiting Pre-cert for SNF. Brief History:      \"The patient is a 72 y.o. Non-/non  female who presents with Abdominal Pain (lower); Flank Pain (bilat); and Dysuria (few days)   and she is admitted to the hospital for the management of  Acute cystitis   Patient with known h/o CHF, DM, Afib, CAD, CVA, COPD, HTN, HPL, PD, ECTOR, RLS as well had a recent admission for urosepsis presented to ER with progressive weakness and fatigue with associated chills and dysuria over the last 3 days, she denies any chest pain, sob, nausea, vomiting, fever or chills     In ER urine was found to have UTI and had some leukocytosis with worsening creatinine and also noted to have hypercalcemia. She was admitted for furthe management. \"       Review of Systems:     Constitutional:  negative for chills, fevers, sweats  Respiratory:  negative for cough, dyspnea on exertion, shortness of breath, wheezing  Cardiovascular:  negative for chest pain, chest pressure/discomfort, lower extremity edema, palpitations  Gastrointestinal:  negative for abdominal pain, constipation, diarrhea, nausea, vomiting  Neurological:  negative for dizziness, headache    Medications: Allergies:     Allergies   Allergen Reactions    Dye [Barium-Containing Compounds] Other (See Comments)     Cause Afib per     Pcn [Penicillins] Itching and Swelling    Bactrim [Sulfamethoxazole-Trimethoprim] Other (See Comments)     Allergic Nephritis       Current Meds:   Scheduled Meds:    apixaban  5 mg Oral BID    insulin lispro  0-6 Units Subcutaneous TID WC    insulin lispro  0-3 Units Subcutaneous Nightly    polyethylene glycol  17 g Oral Daily    spironolactone  25 mg Oral Daily    ipratropium-albuterol  3 mL Inhalation TID    potassium chloride  20 mEq Oral Daily    sodium chloride flush  10 mL Intravenous 2 times per day    docusate sodium  100 mg Oral BID    meropenem  500 mg Intravenous Q12H    amiodarone  200 mg Oral Daily    aspirin  81 mg Oral Daily    atorvastatin  20 mg Oral Daily    mometasone-formoterol  2 puff Inhalation BID    carbidopa-levodopa  1 tablet Oral 4x Daily    linagliptin  2.5 mg Oral Daily    metoprolol tartrate  12.5 mg Oral BID    rasagiline  1 mg Oral Daily    rOPINIRole  2 mg Oral TID     Continuous Infusions:    dextrose       PRN Meds: glucose, dextrose, glucagon (rDNA), dextrose, polyvinyl alcohol, HYDROcodone 5 mg - acetaminophen, sodium chloride flush, nicotine, ondansetron **OR** ondansetron, ALPRAZolam, melatonin    Data:     Past Medical History:   has a past medical history of Acute on chronic diastolic congestive heart failure ([facility] Anesthesia complication; Asthma; Atrial fibrillation ([facility] Cataracts, bilateral; Cellulitis; Cerebral artery occlusion with cerebral infarction ([facility] Chronic kidney disease; Constipation; COPD (chronic obstructive pulmonary disease) ([facility] Difficult intravenous access; Diverticulosis; DM2 (diabetes mellitus, type 2) ([facility] Full dentures; GERD (gastroesophageal reflux disease); Headache(784.0); [place] (hard of hearing); Hyperlipidemia; Hyperplastic polyp of intestine; Hypertension; Impaired ambulation; Kidney stone;  Morbid obesity with BMI of 40.0-44.9, adult ([facility] ECTOR on CPAP;

## 2018-12-03 NOTE — PROGRESS NOTES
NEPHROLOGY PROGRESS NOTE      SUBJECTIVE   Patient was seen and examined. She continues to feel tired. Appetite still suboptimal.   Blood pressure stable 130-140s. Urine output not accurate as patient is incontinent. Creatinine improved further back to her baseline at 1.85 mg/dL. Hypercalcemia has corrected after stopping Rocaltrol and with volume expansion. Serologies positive for paraproteinemia  Heme/Onc consulted. They do not believe there is a relationship between her MGUS and hypercalcemia. Patient is awaiting a rehabilitation spot. OBJECTIVE     Vitals:    12/03/18 0347 12/03/18 0727 12/03/18 0731 12/03/18 1000   BP: (!) 121/46 (!) 154/62     Pulse: 58 58     Resp: 16 18     Temp: 98.5 °F (36.9 °C) 98.2 °F (36.8 °C)     TempSrc: Oral Oral     SpO2: 99% 99%  97%   Weight:   184 lb 4.8 oz (83.6 kg)    Height:         24HR INTAKE/OUTPUT:    No intake or output data in the 24 hours ending 12/03/18 1430    General appearance:Awake, alert, in no acute distress, comfortable  Respiratory::clear to auscultation bilaterally without any wheezes or rales or rhonchi  Cardiovascular:S1 S2 normal,no gallop or organic murmur. Abdomen:Non tender/non distended. Bowel sounds present  Extremities: No Cyanosis or Clubbing,Lower extremity edema  Neurological:Alert and oriented. No abnormalities of mood, affect, memory, mentation, or behavior are noted      MEDICATIONS     Scheduled Meds:    meropenem  1 g Intravenous Q12H    apixaban  5 mg Oral BID    insulin lispro  0-6 Units Subcutaneous TID     insulin lispro  0-3 Units Subcutaneous Nightly    polyethylene glycol  17 g Oral Daily    spironolactone  25 mg Oral Daily    ipratropium-albuterol  3 mL Inhalation TID    potassium chloride  20 mEq Oral Daily    sodium chloride flush  10 mL Intravenous 2 times per day    docusate sodium  100 mg Oral BID    amiodarone  200 mg Oral Daily    aspirin  81 mg Oral Daily    atorvastatin  20 mg Oral Daily    mometasone-formoterol  2 puff Inhalation BID    carbidopa-levodopa  1 tablet Oral 4x Daily    linagliptin  2.5 mg Oral Daily    metoprolol tartrate  12.5 mg Oral BID    rasagiline  1 mg Oral Daily    rOPINIRole  2 mg Oral TID     Continuous Infusions:    dextrose       PRN Meds:  glucose, dextrose, glucagon (rDNA), dextrose, polyvinyl alcohol, HYDROcodone 5 mg - acetaminophen, sodium chloride flush, nicotine, ondansetron **OR** ondansetron, ALPRAZolam, melatonin  Home Meds:                Prescriptions Prior to Admission: cyanocobalamin 1000 MCG/ML injection, Inject 1,000 mcg into the muscle See Admin Instructions Every 3 weeks  Cyanocobalamin (VITAMIN B-12) 2500 MCG SUBL, Place 2,500 mcg under the tongue daily  conjugated estrogens (PREMARIN) 0.625 MG/GM vaginal cream, Place 0.5 g vaginally daily as needed Place vaginally daily. albuterol (ACCUNEB) 1.25 MG/3ML nebulizer solution, Inhale 1 ampule into the lungs every 6 hours as needed for Wheezing or Shortness of Breath  linagliptin (TRADJENTA) 5 MG tablet, Take 0.5 tablets by mouth daily  nystatin (MYCOSTATIN) 185844 UNIT/GM powder, Apply 3 times daily. amiodarone (CORDARONE) 200 MG tablet, Take 200 mg by mouth daily   metoprolol tartrate (LOPRESSOR) 25 MG tablet, Take 25 mg by mouth daily Takes 1/2 tablet BID  ALPRAZolam (XANAX) 0.25 MG tablet, Take 0.25 mg by mouth 3 times daily as needed for Anxiety. .  furosemide (LASIX) 20 MG tablet, Take 40 mg by mouth daily   ipratropium-albuterol (DUONEB) 0.5-2.5 (3) MG/3ML SOLN nebulizer solution, Inhale 3 mLs into the lungs three times daily  melatonin 3 MG TABS tablet, Take 1 tablet by mouth nightly as needed (sleep)  magnesium oxide (MAG-OX) 400 (241.3 Mg) MG TABS tablet, Take 1 tablet by mouth 2 times daily  calcitRIOL (ROCALTROL) 0.25 MCG capsule, TAKE 1 CAPSULE THREE TIMES A DAY  atorvastatin (LIPITOR) 20 MG tablet, TAKE 1 TABLET DAILY  carbidopa-levodopa (SINEMET CR)  MG per extended release tablet, Take 1 tablet by mouth 4 times daily  CALCIUM CITRATE PO, Take 500 mg by mouth 3 times daily   senna (SENOKOT) 8.6 MG tablet, Take 2 tablets by mouth nightly   aspirin 81 MG tablet, Take 81 mg by mouth daily   rOPINIRole (REQUIP) 2 MG tablet, Take 2 mg by mouth 3 times daily  pantoprazole (PROTONIX) 40 MG tablet, Take 1 tablet by mouth 2 times daily (before meals)  BREO ELLIPTA 100-25 MCG/INH AEPB inhaler, Inhale 1 puff into the lungs daily  ferrous sulfate 325 (65 Fe) MG EC tablet, Take 1 tablet by mouth 2 times daily (with meals)  vitamin D (CHOLECALCIFEROL) 5000 units CAPS capsule, Take 5,000 Units by mouth daily  rasagiline mesylate 1 MG TABS, Take 1 tablet by mouth daily  ONETOUCH VERIO strip, 1 each by In Vitro route daily As needed. ONETOUCH DELICA LANCETS 73P MISC, 1 Units by Does not apply route 2 times daily  SPIRIVA RESPIMAT 2.5 MCG/ACT AERS inhaler, Inhale 2 puffs into the lungs daily  Oxygen Concentrator, Dx: Pneumonia, use as directed. (Patient taking differently: Dx: Pneumonia, use as directed. ON 24/7)    INVESTIGATIONS     Last 3 CMP:    Recent Labs      12/01/18   0855  12/02/18   0900  12/03/18   0822   NA  141  140  139   K  3.9  4.2  4.4   CL  99  98  97*   CO2  31  32*  29   BUN  27*  24*  23   CREATININE  2.65*  2.05*  1.85*   CALCIUM  9.7  9.4  9.2       Last 3 CBC:  Recent Labs      12/03/18   0822   WBC  6.6   RBC  2.65*   HGB  8.6*   HCT  25.8*   MCV  97.3   MCH  32.5   MCHC  33.4   RDW  16.0*   PLT  101*   MPV  8.3       ASSESSMENT     1. Acute kidney injury nonoliguric secondary to hypercalcemia, resolved  2. Hypercalcemia secondary to volume depletion/Rocaltrol use - improved  3. 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 She had this problem since childhood. She used to follow up with a doctor family and the  patient does not know the Specialty, and the doctor has passed away now.    4.  Chronic kidney disease stage

## 2018-12-03 NOTE — PROGRESS NOTES
Writer contacted attending physician. Pt is requesting Tylenol. She c/o headache. She refused the [address] new orders.

## 2018-12-04 LAB
ABSOLUTE EOS #: 0.4 K/UL (ref 0–0.4)
ABSOLUTE IMMATURE GRANULOCYTE: ABNORMAL K/UL (ref 0–0.3)
ABSOLUTE LYMPH #: 1.1 K/UL (ref 1–4.8)
ABSOLUTE MONO #: 0.7 K/UL (ref 0.2–0.8)
ANION GAP SERPL CALCULATED.3IONS-SCNC: 13 MMOL/L (ref 9–17)
BASOPHILS # BLD: 0 % (ref 0–2)
BASOPHILS ABSOLUTE: 0 K/UL (ref 0–0.2)
BUN BLDV-MCNC: 29 MG/DL (ref 8–23)
BUN/CREAT BLD: 14 (ref 9–20)
CALCIUM SERPL-MCNC: 9.2 MG/DL (ref 8.6–10.4)
CHLORIDE BLD-SCNC: 98 MMOL/L (ref 98–107)
CO2: 30 MMOL/L (ref 20–31)
CREAT SERPL-MCNC: 2.07 MG/DL (ref 0.5–0.9)
CREATININE URINE: 54.6 MG/DL (ref 28–217)
DIFFERENTIAL TYPE: ABNORMAL
EOSINOPHILS RELATIVE PERCENT: 5 % (ref 1–4)
GFR AFRICAN AMERICAN: 29 ML/MIN
GFR NON-AFRICAN AMERICAN: 24 ML/MIN
GFR SERPL CREATININE-BSD FRML MDRD: ABNORMAL ML/MIN/{1.73_M2}
GFR SERPL CREATININE-BSD FRML MDRD: ABNORMAL ML/MIN/{1.73_M2}
GLUCOSE BLD-MCNC: 103 MG/DL (ref 65–105)
GLUCOSE BLD-MCNC: 106 MG/DL (ref 65–105)
GLUCOSE BLD-MCNC: 81 MG/DL (ref 65–105)
GLUCOSE BLD-MCNC: 91 MG/DL (ref 65–105)
GLUCOSE BLD-MCNC: 98 MG/DL (ref 70–99)
HCT VFR BLD CALC: 26 % (ref 36–46)
HEMOGLOBIN: 8.7 G/DL (ref 12–16)
IMMATURE GRANULOCYTES: ABNORMAL %
LYMPHOCYTES # BLD: 13 % (ref 24–44)
MAGNESIUM: 1.7 MG/DL (ref 1.6–2.6)
MCH RBC QN AUTO: 33 PG (ref 26–34)
MCHC RBC AUTO-ENTMCNC: 33.4 G/DL (ref 31–37)
MCV RBC AUTO: 98.8 FL (ref 80–100)
MONOCYTES # BLD: 8 % (ref 1–7)
NRBC AUTOMATED: ABNORMAL PER 100 WBC
PDW BLD-RTO: 15.9 % (ref 11.5–14.5)
PLATELET # BLD: 138 K/UL (ref 130–400)
PLATELET ESTIMATE: ABNORMAL
PMV BLD AUTO: 8.5 FL (ref 6–12)
POTASSIUM SERPL-SCNC: 4.1 MMOL/L (ref 3.7–5.3)
RBC # BLD: 2.63 M/UL (ref 4–5.2)
RBC # BLD: ABNORMAL 10*6/UL
SEG NEUTROPHILS: 74 % (ref 36–66)
SEGMENTED NEUTROPHILS ABSOLUTE COUNT: 5.9 K/UL (ref 1.8–7.7)
SODIUM BLD-SCNC: 141 MMOL/L (ref 135–144)
TOTAL PROTEIN, URINE: 42 MG/DL
URINE TOTAL PROTEIN CREATININE RATIO: 0.77 (ref 0–0.2)
WBC # BLD: 8 K/UL (ref 3.5–11)
WBC # BLD: ABNORMAL 10*3/UL

## 2018-12-04 PROCEDURE — 6370000000 HC RX 637 (ALT 250 FOR IP): Performed by: INTERNAL MEDICINE

## 2018-12-04 PROCEDURE — 80048 BASIC METABOLIC PNL TOTAL CA: CPT

## 2018-12-04 PROCEDURE — 97110 THERAPEUTIC EXERCISES: CPT

## 2018-12-04 PROCEDURE — 82570 ASSAY OF URINE CREATININE: CPT

## 2018-12-04 PROCEDURE — 99232 SBSQ HOSP IP/OBS MODERATE 35: CPT | Performed by: INTERNAL MEDICINE

## 2018-12-04 PROCEDURE — 36415 COLL VENOUS BLD VENIPUNCTURE: CPT

## 2018-12-04 PROCEDURE — 1200000000 HC SEMI PRIVATE

## 2018-12-04 PROCEDURE — 6360000002 HC RX W HCPCS: Performed by: FAMILY MEDICINE

## 2018-12-04 PROCEDURE — 6370000000 HC RX 637 (ALT 250 FOR IP): Performed by: FAMILY MEDICINE

## 2018-12-04 PROCEDURE — 2700000000 HC OXYGEN THERAPY PER DAY

## 2018-12-04 PROCEDURE — 2580000003 HC RX 258: Performed by: FAMILY MEDICINE

## 2018-12-04 PROCEDURE — 94640 AIRWAY INHALATION TREATMENT: CPT

## 2018-12-04 PROCEDURE — 83735 ASSAY OF MAGNESIUM: CPT

## 2018-12-04 PROCEDURE — 97116 GAIT TRAINING THERAPY: CPT

## 2018-12-04 PROCEDURE — 84156 ASSAY OF PROTEIN URINE: CPT

## 2018-12-04 PROCEDURE — 85025 COMPLETE CBC W/AUTO DIFF WBC: CPT

## 2018-12-04 PROCEDURE — 97535 SELF CARE MNGMENT TRAINING: CPT | Performed by: NURSE PRACTITIONER

## 2018-12-04 PROCEDURE — 97530 THERAPEUTIC ACTIVITIES: CPT | Performed by: NURSE PRACTITIONER

## 2018-12-04 PROCEDURE — 82947 ASSAY GLUCOSE BLOOD QUANT: CPT

## 2018-12-04 PROCEDURE — 94760 N-INVAS EAR/PLS OXIMETRY 1: CPT

## 2018-12-04 RX ADMIN — ALPRAZOLAM 0.25 MG: 0.25 TABLET ORAL at 21:07

## 2018-12-04 RX ADMIN — CARBIDOPA AND LEVODOPA 1 TABLET: 25; 100 TABLET, EXTENDED RELEASE ORAL at 13:08

## 2018-12-04 RX ADMIN — CARBIDOPA AND LEVODOPA 1 TABLET: 25; 100 TABLET, EXTENDED RELEASE ORAL at 21:07

## 2018-12-04 RX ADMIN — Medication 10 ML: at 08:22

## 2018-12-04 RX ADMIN — IPRATROPIUM BROMIDE AND ALBUTEROL SULFATE 3 ML: .5; 3 SOLUTION RESPIRATORY (INHALATION) at 19:36

## 2018-12-04 RX ADMIN — DOCUSATE SODIUM 100 MG: 100 CAPSULE, LIQUID FILLED ORAL at 08:24

## 2018-12-04 RX ADMIN — ROPINIROLE HYDROCHLORIDE 2 MG: 2 TABLET, FILM COATED ORAL at 13:08

## 2018-12-04 RX ADMIN — ASPIRIN 81 MG: 81 TABLET, COATED ORAL at 08:24

## 2018-12-04 RX ADMIN — AMIODARONE HYDROCHLORIDE 200 MG: 200 TABLET ORAL at 08:24

## 2018-12-04 RX ADMIN — Medication 10 ML: at 21:07

## 2018-12-04 RX ADMIN — Medication 2 PUFF: at 19:36

## 2018-12-04 RX ADMIN — LINAGLIPTIN 2.5 MG: 5 TABLET, FILM COATED ORAL at 08:24

## 2018-12-04 RX ADMIN — CARBIDOPA AND LEVODOPA 1 TABLET: 25; 100 TABLET, EXTENDED RELEASE ORAL at 16:52

## 2018-12-04 RX ADMIN — IPRATROPIUM BROMIDE AND ALBUTEROL SULFATE 3 ML: .5; 3 SOLUTION RESPIRATORY (INHALATION) at 14:10

## 2018-12-04 RX ADMIN — HYDROCODONE BITARTRATE AND ACETAMINOPHEN 1 TABLET: 5; 325 TABLET ORAL at 13:19

## 2018-12-04 RX ADMIN — SPIRONOLACTONE 25 MG: 25 TABLET ORAL at 08:24

## 2018-12-04 RX ADMIN — DOCUSATE SODIUM 100 MG: 100 CAPSULE, LIQUID FILLED ORAL at 21:07

## 2018-12-04 RX ADMIN — IPRATROPIUM BROMIDE AND ALBUTEROL SULFATE 3 ML: .5; 3 SOLUTION RESPIRATORY (INHALATION) at 07:42

## 2018-12-04 RX ADMIN — ATORVASTATIN CALCIUM 20 MG: 20 TABLET, FILM COATED ORAL at 08:24

## 2018-12-04 RX ADMIN — RASAGILINE 1 MG: 0.5 TABLET ORAL at 08:27

## 2018-12-04 RX ADMIN — HYDROCODONE BITARTRATE AND ACETAMINOPHEN 1 TABLET: 5; 325 TABLET ORAL at 05:41

## 2018-12-04 RX ADMIN — APIXABAN 5 MG: 5 TABLET, FILM COATED ORAL at 08:24

## 2018-12-04 RX ADMIN — ROPINIROLE HYDROCHLORIDE 2 MG: 2 TABLET, FILM COATED ORAL at 08:27

## 2018-12-04 RX ADMIN — ROPINIROLE HYDROCHLORIDE 2 MG: 2 TABLET, FILM COATED ORAL at 21:07

## 2018-12-04 RX ADMIN — Medication 2 PUFF: at 07:43

## 2018-12-04 RX ADMIN — ONDANSETRON 4 MG: 4 TABLET, ORALLY DISINTEGRATING ORAL at 10:14

## 2018-12-04 RX ADMIN — POTASSIUM CHLORIDE 20 MEQ: 40 SOLUTION ORAL at 08:22

## 2018-12-04 RX ADMIN — METOPROLOL TARTRATE 12.5 MG: 25 TABLET ORAL at 21:08

## 2018-12-04 RX ADMIN — METOPROLOL TARTRATE 12.5 MG: 25 TABLET ORAL at 08:23

## 2018-12-04 RX ADMIN — POLYETHYLENE GLYCOL (3350) 17 G: 17 POWDER, FOR SOLUTION ORAL at 08:21

## 2018-12-04 RX ADMIN — CARBIDOPA AND LEVODOPA 1 TABLET: 25; 100 TABLET, EXTENDED RELEASE ORAL at 08:24

## 2018-12-04 ASSESSMENT — PAIN DESCRIPTION - PROGRESSION: CLINICAL_PROGRESSION: GRADUALLY IMPROVING

## 2018-12-04 ASSESSMENT — PAIN DESCRIPTION - DESCRIPTORS: DESCRIPTORS: ACHING

## 2018-12-04 ASSESSMENT — PAIN DESCRIPTION - FREQUENCY: FREQUENCY: CONTINUOUS

## 2018-12-04 ASSESSMENT — PAIN DESCRIPTION - ONSET: ONSET: UNABLE TO TELL

## 2018-12-04 ASSESSMENT — PAIN DESCRIPTION - LOCATION
LOCATION: GENERALIZED
LOCATION: ABDOMEN

## 2018-12-04 ASSESSMENT — PAIN SCALES - GENERAL
PAINLEVEL_OUTOF10: 6
PAINLEVEL_OUTOF10: 5
PAINLEVEL_OUTOF10: 8

## 2018-12-04 ASSESSMENT — PAIN DESCRIPTION - PAIN TYPE
TYPE: CHRONIC PAIN
TYPE: ACUTE PAIN

## 2018-12-04 ASSESSMENT — ENCOUNTER SYMPTOMS
RESPIRATORY NEGATIVE: 1
GASTROINTESTINAL NEGATIVE: 1

## 2018-12-04 ASSESSMENT — PAIN DESCRIPTION - ORIENTATION: ORIENTATION: RIGHT;LEFT;MID

## 2018-12-04 NOTE — PROGRESS NOTES
Writer contacted attending physician. Kidney labs were elevated from yesterday's results. Will continue to monitor.

## 2018-12-04 NOTE — PROGRESS NOTES
[facility]    Progress Note    12/4/2018    8:48 AM    Name:   Bridgette Crockett  MRN:     [id number]     Acct:      [de-identified]   Room:   2023/2023-01  IP Day:  7  Admit Date:  11/27/2018  9:30 AM    PCP:   Yancy Betancourt DO  Code Status:  Full Code    Subjective:     C/C:   Chief Complaint   Patient presents with    Abdominal Pain     lower    Flank Pain     bilat    Dysuria     few days     Interval History Status: improved. Patient seen and examined at bedside. , Caregiver Hope and Hope's daughter at bedside.  very angry about the lack of communication between teams. Especially upset about AC being started as patient was \"taken off of plavix by Dr. Babs Larsen, so why was Eliquis started? \"  Insists that Dr. Babs Larsen be involved in [facility] decision. Patient having hematuria now. Creatinine increased.  also very upset about patient's disposition. Insisting that she needs inpatient rehab. Apparently, in September, after her last stay in rehab, patient was ambulating to [facility], cooking for herself, going out shopping and holding the cart by herself. She has significantly regressed since then. Brief History:      \"The patient is a 72 y.o. Non-/non  female who presents with Abdominal Pain (lower); Flank Pain (bilat); and Dysuria (few days)   and she is admitted to the hospital for the management of  Acute cystitis   Patient with known h/o CHF, DM, Afib, CAD, CVA, COPD, HTN, HPL, PD, ECTOR, RLS as well had a recent admission for urosepsis presented to ER with progressive weakness and fatigue with associated chills and dysuria over the last 3 days, she denies any chest pain, sob, nausea, vomiting, fever or chills     In ER urine was found to have UTI and had some leukocytosis with worsening creatinine and also noted to have hypercalcemia. She was admitted for furthe management. \"       Review of Systems:     Constitutional: negative for chills, fevers, sweats  Respiratory:  negative for cough, dyspnea on exertion, shortness of breath, wheezing  Cardiovascular:  negative for chest pain, chest pressure/discomfort, lower extremity edema, palpitations  Gastrointestinal:  negative for abdominal pain, constipation, diarrhea, nausea, vomiting  Neurological:  negative for dizziness, headache    Medications: Allergies: Allergies   Allergen Reactions    Dye [Barium-Containing Compounds] Other (See Comments)     Cause Afib per     Pcn [Penicillins] Itching and Swelling    Bactrim [Sulfamethoxazole-Trimethoprim] Other (See Comments)     Allergic Nephritis       Current Meds:   Scheduled Meds:    apixaban  5 mg Oral BID    insulin lispro  0-6 Units Subcutaneous TID WC    insulin lispro  0-3 Units Subcutaneous Nightly    polyethylene glycol  17 g Oral Daily    spironolactone  25 mg Oral Daily    ipratropium-albuterol  3 mL Inhalation TID    potassium chloride  20 mEq Oral Daily    sodium chloride flush  10 mL Intravenous 2 times per day    docusate sodium  100 mg Oral BID    amiodarone  200 mg Oral Daily    aspirin  81 mg Oral Daily    atorvastatin  20 mg Oral Daily    mometasone-formoterol  2 puff Inhalation BID    carbidopa-levodopa  1 tablet Oral 4x Daily    linagliptin  2.5 mg Oral Daily    metoprolol tartrate  12.5 mg Oral BID    rasagiline  1 mg Oral Daily    rOPINIRole  2 mg Oral TID     Continuous Infusions:    dextrose       PRN Meds: glucose, dextrose, glucagon (rDNA), dextrose, polyvinyl alcohol, HYDROcodone 5 mg - acetaminophen, sodium chloride flush, nicotine, ondansetron **OR** ondansetron, ALPRAZolam, melatonin    Data:     Past Medical History:   has a past medical history of Acute on chronic diastolic congestive heart failure ([facility] [address] Anesthesia complication; Asthma; Atrial fibrillation ([facility] [address] Cataracts, bilateral; Cellulitis; Cerebral artery occlusion with cerebral infarction ([address]  Chronic kidney 32.5  33.0   MCHC  33.4  33.4   RDW  16.0*  15.9*   PLT  101*  138   MPV  8.3  8.5     Chemistry:  Recent Labs      12/02/18   0900  12/03/18   0822  12/04/18   0657   NA  140  139  141   K  4.2  4.4  4.1   CL  98  97*  98   CO2  32*  29  30   GLUCOSE  130*  99  98   BUN  24*  23  29*   CREATININE  2.05*  1.85*  2.07*   MG   --   1.8  1.7   ANIONGAP  10  13  13   LABGLOM  24*  27*  24*   GFRAA  29*  33*  29*   CALCIUM  9.4  9.2  9.2     Recent Labs      12/02/18   0900  12/02/18   2149  12/03/18   0644  12/03/18   1104  12/03/18   1631  12/03/18   2058  12/04/18   0552   LABA1C  5.4   --    --    --    --    --    --    POCGLU   --   127*  96  125*  120*  103  106*         Lab Results   Component Value Date/Time    SPECIAL RIGHT ARM, 10ML 11/27/2018 10:20 AM     Lab Results   Component Value Date/Time    CULTURE NO GROWTH 6 DAYS 11/27/2018 10:20 AM       Lab Results   Component Value Date    POCPH 7.46 09/22/2018    POCPCO2 51 09/22/2018    POCPO2 73 09/22/2018    POCHCO3 36.0 09/22/2018    NBEA NOT REPORTED 09/22/2018    PBEA 12 09/22/2018    XYT3GWV 38 09/22/2018    ZPPL4KTF 95 09/22/2018    FIO2 NOT REPORTED 10/06/2018       Radiology:  CT OF THE ABDOMEN AND PELVIS WITHOUT CONTRAST 11/27/2018 4:35 pm    Nonobstructing bilateral nephrolithiasis. Gallbladder sludge. Increased stool burden especially in the rectum measuring up to 7 cm. This  could be due to constipation. Fecal impaction could also be considered.       Physical Examination:        General appearance:  alert, cooperative and no distress, nasal canula  Lungs:  minimal bibasilar crackles  ular rate and rhythm, no murmur  Abdomen:  soft, nontender, nondistended, normal bowel sounds, no masses, hepatomegaly, splenomegaly  Extremities:  no edema, redness, tenderness in the calves  Skin:  no gross lesions, rashes, induration    Assessment:        Primary Problem  Acute kidney injury [facility])    Active Hospital Problems    Diagnosis Date Noted    Monoclonal gammopathy of undetermined significance [D47.2] 11/29/2018    Acute nonintractable headache [R51]     Acute kidney injury ([address] [N17.9] 11/27/2018    Hypercalcemia [E83.52] 11/27/2018    History of ESBL E. coli infection [Z86.19]     Chronic diastolic congestive heart failure (HCC) [I50.32] 09/23/2018    Anemia of chronic renal failure, stage 4 (severe) ([address] [N18.4, D63.1] 04/25/2018    Chronic respiratory failure with hypoxia and hypercapnia (HCC) [G26.60, J96.12] 12/17/2017    Acute cystitis with hematuria [N30.01] 12/28/2016    ECTOR on CPAP [G47.33, Z99.89]     Controlled type 2 diabetes mellitus with stage 3 chronic kidney disease, without long-term current use of insulin ([address] [E11.22, N18.3]        Plan:           Principal Problem:    Acute kidney injury ([address]  Active Problems:    Controlled type 2 diabetes mellitus with stage 3 chronic kidney disease, without long-term current use of insulin (HCC)    ECTOR on CPAP    Acute cystitis with hematuria    Chronic respiratory failure with hypoxia and hypercapnia (HCC)    Anemia of chronic renal failure, stage 4 (severe) (HCC)    Chronic diastolic congestive heart failure (HCC)    History of ESBL E. coli infection    Hypercalcemia  Resolved Problems:    Atrial fibrillation with RVR ([address]    Acute kidney injury superimposed on chronic kidney disease (HCC)       Creatinine worsening today. F/u nephrology recommendations    Hematuria and epistaxis due to over anticoagulation with Eliquis. For her Paroxysmal AFib, patient was not on AC at admission. Patient discussion with previous attending and plan was initially to start Eliquis and dc patient with Eliquis for CVA prophylaxis. However, patient developed worsening renal function and hematuria. Plan to hold [facility]. Patient understands stroke risk. Per patient and family's request, consulting her cardiologist, Dr. Maribel Combs, to help with decision to [facility].      Abx (Meropenem) , ID currently following, no need for Abx on DC    Constipation. Cont bowel regimen. DC'ed do    DM2. Cont SSI. Carb control diet. Hypoglycemia protocol. Hypercalcemia resolved currently, was on Ca supplement and vit D as she had long history of hypocalcemia    MGUS:  Hem/onc on board    Continue supplemental O2    Continue other chronic home meds and inhalers    Blood culture NGTD    DVT and GI PPx     Will discharge when arrangements complete and ok with other services. Follow-up with PCP in one week, Lesly Hill DO  Notify PCP of discharge    PT/OT. SNF Pre-cert pending.        Bobby Tejada MD  12/4/2018  8:48 AM

## 2018-12-04 NOTE — PROGRESS NOTES
Occupational Therapy  Facility/Department: [facility]  Daily Treatment Note  NAME: Kal Lorenzana  : 1945  MRN: [id number]    Date of Service: 2018    Discharge Recommendations:  [address]    Patient would benefit from SNF for continued occupational therapy to increase independence with  ADL of bathing, dressing, toileting and grooming. Writer recommending SNF placement for for activity tolerance and strength which will increase independence with ADL's coordinated with bed mobility and chair transfers. Continued skilled OT services to address decreased safety awareness with ADL and IADL tasks and for education and increased independence with DME and AE for fall prevention and ec/ws techniques prior to d/c home. Patient Diagnosis(es): The encounter diagnosis was KRISTIN (acute kidney injury) ([facility] [address] has a past medical history of Acute on chronic diastolic congestive heart failure ([address] Anesthesia complication; Asthma; Atrial fibrillation ([address] Cataracts, bilateral; Cellulitis; Cerebral artery occlusion with cerebral infarction ([address] Chronic kidney disease; Constipation; COPD (chronic obstructive pulmonary disease) ([address] Difficult intravenous access; Diverticulosis; DM2 (diabetes mellitus, type 2) ([address] Full dentures; GERD (gastroesophageal reflux disease); Headache(784.0); Ruby (hard of hearing); Hyperlipidemia; Hyperplastic polyp of intestine; Hypertension; Impaired ambulation; Kidney stone; Morbid obesity with BMI of 40.0-44.9, adult ([address] ECTOR on CPAP; Osteoarthritis; Parkinson disease ([address] Restless leg syndrome; Spinal stenosis in cervical region; Type II or unspecified type diabetes mellitus without mention of complication, not stated as uncontrolled; Unspecified sleep apnea; Urethral caruncle; and Wears glasses. has a past surgical history that includes Cervical disc surgery (); Appendectomy;  Tonsillectomy and adenoidectomy (); hernia repair (); eye balance  Prognosis: Good  Patient Education: Education provided on OT POC including goals, treatment interventions, and discharge plan/recommendations. Patient verbalize and/or demonstrate good engagement and understanding of edu provided. REQUIRES OT FOLLOW UP: Yes  Activity Tolerance  Activity Tolerance: Patient Tolerated treatment well  Safety Devices  Safety Devices in place: Yes  Type of devices: Call light within reach; Left in chair;Patient at risk for falls;Nurse notified;Gait belt; All fall risk precautions in place; Chair alarm in place          Plan   Plan  Times per week: 4x/week  Current Treatment Recommendations: Strengthening, Balance Training, Functional Mobility Training, Endurance Training, Safety Education & Training, Patient/Caregiver Education & Training, Equipment Evaluation, Education, & procurement, Positioning    AM-PAC Score        AM-PAC Inpatient Daily Activity Raw Score: 18  AM-PAC Inpatient ADL T-Scale Score : 38.66  ADL Inpatient CMS 0-100% Score: 46.65  ADL Inpatient CMS G-Code Modifier : CK    Goals  Short term goals  Time Frame for Short term goals: by discharge, pt will  Short term goal 1: demo SBA for ADL transfers with min cues for safety  Short term goal 2: demo SBA with functional mob short distances for ADL completion with min cues for safety  Short term goal 3: demo SBA with toileting routine  Short term goal 4: demo min A UB ADLs following set up at approp level  Short term goal 5: demo and verb good (-) understanding of fall prevention techs for home  Patient Goals   Patient goals : to go home       Therapy Time   Individual Concurrent Group Co-treatment   Time In 0850         Time Out 0929         Minutes 39             Goals updated as pt has made significant progress since eval 12/6/18  MADONNA Mendoza/CASSANDRA Vega

## 2018-12-04 NOTE — PROGRESS NOTES
[facility]  Progress Note    Bridgette Crockett  MRN: [id number]  Date: 12/4/2018    Reason for F/U :   Urinary tract infection    Impression :   1. Acute cystitis with hematuria  · History of ESBL E. coli urinary tract infection  2. Hypercalcemia - felt secondary to rocaltrol  3. MGUS  4. Acute kidney injury on chronic kidney disease stage III-Improved and now creatinine back to baseline  5. Toxic metabolic encephalopathy-resolved  6. Constipation/fecal impaction  7. Prior CVA    Recommendations:   · She has completed a 7 day course of meropenem  · She is currently off antimicrobial therapy at this time  · The plan is for her to be discharged to an extended care facility when all arrangements have been made. · Infect[facility] disease[facility]wise is nothing further to add and I will sign off. Please call if we can be of any further assistance. Infection Control Recommendations:   Contact precautions    Discharge Planning:   Estimated Length of IV antimicrobials: 7 days  Patient will need Midline Catheter Insertion/ PICC line Insertion: No  Patient will need: Home IV , [place],  SNF,  LTAC: Undetermined  Patient willneed outpatient wound care: No    MedicalDecision making / Summary of Stay:   Bridgette Crockett is a 72y.o.-year-old female who was initially admitted on 11/27/2018. Glo Lazar is known to me from her prior hospital stay here and has known ESBL E. coli in the urine, atrial fibrillation, dilated esophagus with a large ulcer at the GE junction, prior CVA, COPD, diabetes mellitus type II, morbid obesity, hyperlipidemia, anemia, thrombocytopenia, chronic kidney disease stage IV among other medical problems. She was treated with meropenem during her last hospital stay. The patient is a poor historian and cannot tell me why she was put into the emergency room.  According to the records she has had progressive weakness and normally she is able to ambulate around the house with her walker but has not been Resistant >=64 RESISTANT Final   ceFAZolin Resistant >=64 RESISTANT Final   cefTRIAXone Resistant >=64 RESISTANT Final   cefepime Resistant 4 RESISTANT Final   ciprofloxacin Resistant >=4 RESISTANT Final   ertapenem  NOT REPORTED Final   gentamicin Sensitive <=1 SUSCEPTIBLE Final   meropenem Sensitive <=0.25 SUSCEPTIBLE Final   nitrofurantoin Sensitive <=16 SUSCEPTIBLE Final   piperacillin-tazobactam Resistant <=4 RESISTANT Final   tigecycline  NOT REPORTED Final   tobramycin Sensitive <=1 SUSCEPTIBLE Final   trimethoprim-sulfamethoxazole Resistant >=320 RESISTANT Final           Medications:      apixaban  5 mg Oral BID    insulin lispro  0-6 Units Subcutaneous TID WC    insulin lispro  0-3 Units Subcutaneous Nightly    polyethylene glycol  17 g Oral Daily    spironolactone  25 mg Oral Daily    ipratropium-albuterol  3 mL Inhalation TID    potassium chloride  20 mEq Oral Daily    sodium chloride flush  10 mL Intravenous 2 times per day    docusate sodium  100 mg Oral BID    amiodarone  200 mg Oral Daily    aspirin  81 mg Oral Daily    atorvastatin  20 mg Oral Daily    mometasone-formoterol  2 puff Inhalation BID    carbidopa-levodopa  1 tablet Oral 4x Daily    linagliptin  2.5 mg Oral Daily    metoprolol tartrate  12.5 mg Oral BID    rasagiline  1 mg Oral Daily    rOPINIRole  2 mg Oral TID     Thank you for allowing us to participate in the care of this patient. Please call with questions. [facility]  Escobar Hinojosa MD  [employer] messaging  OFFICE: [phone]  CELL:     [phone]    Electronically signed by Escobar Hinojosa MD on 12/4/2018 at 11:16 AM    This note iscreated with the assistance of a speech recognition program.  While intending to generate a document that actually reflects the content of the visit, the document can still have some errors including those of syntax andsound a like substitutions which may escape proof reading.   It such

## 2018-12-04 NOTE — PLAN OF CARE
Problem: Falls - Risk of:  Goal: Will remain free from falls  Will remain free from falls   Outcome: Ongoing  Siderails up x 2  Hourly rounding  Call light in reach  Instructed to call for assist before attempting out of bed. Remains free from falls and accidental injury at this time   Floor free from obstacles  Bed is locked and in lowest position  Adequate lighting provided  Bed alarm on, [place] star and Stay with Me signs posted      Problem: Pain:  Goal: Pain level will decrease  Pain level will decrease   Outcome: Ongoing  Pain level assessment complete.    Patient educated on pain scale and control interventions  PRN pain medication given per patient request  Patient instructed to call out with new onset of pain or unrelieved pain

## 2018-12-05 LAB
ABSOLUTE EOS #: 0.4 K/UL (ref 0–0.4)
ABSOLUTE IMMATURE GRANULOCYTE: ABNORMAL K/UL (ref 0–0.3)
ABSOLUTE LYMPH #: 1.2 K/UL (ref 1–4.8)
ABSOLUTE MONO #: 0.6 K/UL (ref 0.2–0.8)
ALBUMIN, U: DETECTED
ALPHA 1, URINE: DETECTED
ALPHA 2, URINE: DETECTED
ANION GAP SERPL CALCULATED.3IONS-SCNC: 12 MMOL/L (ref 9–17)
BASOPHILS # BLD: 1 % (ref 0–2)
BASOPHILS ABSOLUTE: 0 K/UL (ref 0–0.2)
BETA URINE: DETECTED
BUN BLDV-MCNC: 28 MG/DL (ref 8–23)
BUN/CREAT BLD: 16 (ref 9–20)
CALCIUM SERPL-MCNC: 9.1 MG/DL (ref 8.6–10.4)
CHLORIDE BLD-SCNC: 97 MMOL/L (ref 98–107)
CO2: 30 MMOL/L (ref 20–31)
CREAT SERPL-MCNC: 1.76 MG/DL (ref 0.5–0.9)
DIFFERENTIAL TYPE: ABNORMAL
EOSINOPHILS RELATIVE PERCENT: 7 % (ref 1–4)
FREE KAPPA LT CHAINS,UR: 31.2 MG/DL (ref 0.14–2.42)
FREE LAMBDA LT CHAINS,UR: 1.96 MG/DL (ref 0.02–0.67)
FREE UR LAMBDA EXCRETION/DAY: 5.17 MG/D
GAMMA GLOBULIN, URINE: DETECTED
GFR AFRICAN AMERICAN: 35 ML/MIN
GFR NON-AFRICAN AMERICAN: 28 ML/MIN
GFR SERPL CREATININE-BSD FRML MDRD: ABNORMAL ML/MIN/{1.73_M2}
GFR SERPL CREATININE-BSD FRML MDRD: ABNORMAL ML/MIN/{1.73_M2}
GLUCOSE BLD-MCNC: 101 MG/DL (ref 65–105)
GLUCOSE BLD-MCNC: 103 MG/DL (ref 65–105)
GLUCOSE BLD-MCNC: 108 MG/DL (ref 70–99)
GLUCOSE BLD-MCNC: 87 MG/DL (ref 65–105)
GLUCOSE BLD-MCNC: 90 MG/DL (ref 65–105)
HCT VFR BLD CALC: 25.7 % (ref 36–46)
HEMOGLOBIN: 8.5 G/DL (ref 12–16)
HOURS COLLECTED: 24
IMMATURE GRANULOCYTES: ABNORMAL %
KAPPA LAMBDA URINE INTERP: ABNORMAL
KAPPA/LAMBDA RATIO,U: 15.92 RATIO (ref 2.04–10.37)
LYMPHOCYTES # BLD: 19 % (ref 24–44)
MAGNESIUM: 1.7 MG/DL (ref 1.6–2.6)
MCH RBC QN AUTO: 32.4 PG (ref 26–34)
MCHC RBC AUTO-ENTMCNC: 33.2 G/DL (ref 31–37)
MCV RBC AUTO: 97.6 FL (ref 80–100)
MONOCYTES # BLD: 10 % (ref 1–7)
NRBC AUTOMATED: ABNORMAL PER 100 WBC
PDW BLD-RTO: 16.1 % (ref 11.5–14.5)
PLATELET # BLD: 151 K/UL (ref 130–400)
PLATELET ESTIMATE: ABNORMAL
PMV BLD AUTO: 7.7 FL (ref 6–12)
POTASSIUM SERPL-SCNC: 4.6 MMOL/L (ref 3.7–5.3)
PROTEIN, TOTAL URINE: 109 MG/D (ref 10–140)
RBC # BLD: 2.63 M/UL (ref 4–5.2)
RBC # BLD: ABNORMAL 10*6/UL
SEG NEUTROPHILS: 63 % (ref 36–66)
SEGMENTED NEUTROPHILS ABSOLUTE COUNT: 3.9 K/UL (ref 1.8–7.7)
SODIUM BLD-SCNC: 139 MMOL/L (ref 135–144)
URINE FREE KAPPA EXCRETION/DAY: 82.37 MG/D
URINE VOLUME: 264
WBC # BLD: 6.2 K/UL (ref 3.5–11)
WBC # BLD: ABNORMAL 10*3/UL

## 2018-12-05 PROCEDURE — 83735 ASSAY OF MAGNESIUM: CPT

## 2018-12-05 PROCEDURE — 6370000000 HC RX 637 (ALT 250 FOR IP): Performed by: INTERNAL MEDICINE

## 2018-12-05 PROCEDURE — 80048 BASIC METABOLIC PNL TOTAL CA: CPT

## 2018-12-05 PROCEDURE — 1200000000 HC SEMI PRIVATE

## 2018-12-05 PROCEDURE — 99232 SBSQ HOSP IP/OBS MODERATE 35: CPT | Performed by: INTERNAL MEDICINE

## 2018-12-05 PROCEDURE — 36415 COLL VENOUS BLD VENIPUNCTURE: CPT

## 2018-12-05 PROCEDURE — 97110 THERAPEUTIC EXERCISES: CPT

## 2018-12-05 PROCEDURE — 85025 COMPLETE CBC W/AUTO DIFF WBC: CPT

## 2018-12-05 PROCEDURE — 6370000000 HC RX 637 (ALT 250 FOR IP): Performed by: FAMILY MEDICINE

## 2018-12-05 PROCEDURE — 82947 ASSAY GLUCOSE BLOOD QUANT: CPT

## 2018-12-05 PROCEDURE — 97116 GAIT TRAINING THERAPY: CPT

## 2018-12-05 RX ORDER — SPIRONOLACTONE 25 MG/1
25 TABLET ORAL DAILY
Qty: 30 TABLET | Refills: 3 | Status: SHIPPED | OUTPATIENT
Start: 2018-12-06 | End: 2018-12-28 | Stop reason: SDUPTHER

## 2018-12-05 RX ADMIN — POTASSIUM CHLORIDE 20 MEQ: 40 SOLUTION ORAL at 09:04

## 2018-12-05 RX ADMIN — CARBIDOPA AND LEVODOPA 1 TABLET: 25; 100 TABLET, EXTENDED RELEASE ORAL at 14:39

## 2018-12-05 RX ADMIN — HYDROCODONE BITARTRATE AND ACETAMINOPHEN 1 TABLET: 5; 325 TABLET ORAL at 23:25

## 2018-12-05 RX ADMIN — DOCUSATE SODIUM 100 MG: 100 CAPSULE, LIQUID FILLED ORAL at 09:04

## 2018-12-05 RX ADMIN — CARBIDOPA AND LEVODOPA 1 TABLET: 25; 100 TABLET, EXTENDED RELEASE ORAL at 16:54

## 2018-12-05 RX ADMIN — LINAGLIPTIN 2.5 MG: 5 TABLET, FILM COATED ORAL at 09:04

## 2018-12-05 RX ADMIN — HYDROCODONE BITARTRATE AND ACETAMINOPHEN 1 TABLET: 5; 325 TABLET ORAL at 16:54

## 2018-12-05 RX ADMIN — SPIRONOLACTONE 25 MG: 25 TABLET ORAL at 09:03

## 2018-12-05 RX ADMIN — METOPROLOL TARTRATE 12.5 MG: 25 TABLET ORAL at 09:04

## 2018-12-05 RX ADMIN — ROPINIROLE HYDROCHLORIDE 2 MG: 2 TABLET, FILM COATED ORAL at 09:03

## 2018-12-05 RX ADMIN — ROPINIROLE HYDROCHLORIDE 2 MG: 2 TABLET, FILM COATED ORAL at 14:39

## 2018-12-05 RX ADMIN — RASAGILINE 1 MG: 0.5 TABLET ORAL at 09:03

## 2018-12-05 RX ADMIN — HYDROCODONE BITARTRATE AND ACETAMINOPHEN 1 TABLET: 5; 325 TABLET ORAL at 09:11

## 2018-12-05 RX ADMIN — ASPIRIN 81 MG: 81 TABLET, COATED ORAL at 09:04

## 2018-12-05 RX ADMIN — CARBIDOPA AND LEVODOPA 1 TABLET: 25; 100 TABLET, EXTENDED RELEASE ORAL at 09:04

## 2018-12-05 RX ADMIN — ATORVASTATIN CALCIUM 20 MG: 20 TABLET, FILM COATED ORAL at 09:04

## 2018-12-05 RX ADMIN — ROPINIROLE HYDROCHLORIDE 2 MG: 2 TABLET, FILM COATED ORAL at 23:22

## 2018-12-05 RX ADMIN — METOPROLOL TARTRATE 12.5 MG: 25 TABLET ORAL at 23:22

## 2018-12-05 RX ADMIN — ALPRAZOLAM 0.25 MG: 0.25 TABLET ORAL at 09:11

## 2018-12-05 RX ADMIN — DOCUSATE SODIUM 100 MG: 100 CAPSULE, LIQUID FILLED ORAL at 23:22

## 2018-12-05 RX ADMIN — CARBIDOPA AND LEVODOPA 1 TABLET: 25; 100 TABLET, EXTENDED RELEASE ORAL at 23:22

## 2018-12-05 RX ADMIN — AMIODARONE HYDROCHLORIDE 200 MG: 200 TABLET ORAL at 09:04

## 2018-12-05 ASSESSMENT — PAIN DESCRIPTION - DESCRIPTORS
DESCRIPTORS: ACHING;DISCOMFORT
DESCRIPTORS: ACHING
DESCRIPTORS: ACHING;DISCOMFORT

## 2018-12-05 ASSESSMENT — PAIN SCALES - GENERAL
PAINLEVEL_OUTOF10: 8
PAINLEVEL_OUTOF10: 8
PAINLEVEL_OUTOF10: 10
PAINLEVEL_OUTOF10: 8
PAINLEVEL_OUTOF10: 5

## 2018-12-05 ASSESSMENT — PAIN DESCRIPTION - ONSET
ONSET: ON-GOING

## 2018-12-05 ASSESSMENT — PAIN DESCRIPTION - PROGRESSION
CLINICAL_PROGRESSION: NOT CHANGED

## 2018-12-05 ASSESSMENT — PAIN DESCRIPTION - PAIN TYPE
TYPE: CHRONIC PAIN

## 2018-12-05 ASSESSMENT — PAIN DESCRIPTION - FREQUENCY
FREQUENCY: CONTINUOUS
FREQUENCY: CONTINUOUS

## 2018-12-05 ASSESSMENT — PAIN DESCRIPTION - LOCATION
LOCATION: GENERALIZED

## 2018-12-05 NOTE — CONSULTS
daily  pantoprazole (PROTONIX) 40 MG tablet, Take 1 tablet by mouth 2 times daily (before meals)  BREO ELLIPTA 100-25 MCG/INH AEPB inhaler, Inhale 1 puff into the lungs daily  ferrous sulfate 325 (65 Fe) MG EC tablet, Take 1 tablet by mouth 2 times daily (with meals)  vitamin D (CHOLECALCIFEROL) 5000 units CAPS capsule, Take 5,000 Units by mouth daily  rasagiline mesylate 1 MG TABS, Take 1 tablet by mouth daily  ONETOUCH VERIO strip, 1 each by In Vitro route daily As needed. ONETOUCH DELICA LANCETS 91A MISC, 1 Units by Does not apply route 2 times daily  SPIRIVA RESPIMAT 2.5 MCG/ACT AERS inhaler, Inhale 2 puffs into the lungs daily  Oxygen Concentrator, Dx: Pneumonia, use as directed. (Patient taking differently: Dx: Pneumonia, use as directed. ON 24/7)    Current Medications:     Scheduled Meds:    lactulose  20 g Oral TID    polyethylene glycol  17 g Oral BID    potassium chloride  20 mEq Oral Daily    sodium chloride flush  10 mL Intravenous 2 times per day    docusate sodium  100 mg Oral BID    heparin (porcine)  5,000 Units Subcutaneous 3 times per day    meropenem  500 mg Intravenous Q12H    amiodarone  200 mg Oral Daily    aspirin  81 mg Oral Daily    atorvastatin  20 mg Oral Daily    mometasone-formoterol  2 puff Inhalation BID    carbidopa-levodopa  1 tablet Oral 4x Daily    ipratropium-albuterol  3 mL Inhalation TID    linagliptin  2.5 mg Oral Daily    metoprolol tartrate  12.5 mg Oral BID    rasagiline  1 mg Oral Daily    rOPINIRole  2 mg Oral TID    vitamin D  5,000 Units Oral Daily     Continuous Infusions:    sodium chloride       PRN Meds:  sodium chloride flush, nicotine, acetaminophen, ondansetron **OR** ondansetron, ALPRAZolam, melatonin    Review of Systems:     Constitutional: No fever, no chills, no lethargy, +ve weakness. HEENT:  No headache, otalgia, itchy eyes, nasal discharge or sore throat. Cardiac:  No chest pain, dyspnea, orthopnea or PND.   Chest:   No cough,
34.58 kg/m²     Intake/Output Summary (Last 24 hours) at 12/05/18 0852  Last data filed at 12/04/18 2231   Gross per 24 hour   Intake                0 ml   Output              800 ml   Net             -800 ml       GENERAL:  Alert, appropriate, oriented, in NAD. HEENT:  Head is atraumatic and normocephalic. No Pallor. No icterus. NECK: Supple without any thyromegaly. LUNGS: Generally decreased breath sounds. + Crackles bilaterally. CARDIAC: S1, S2, RRR. ABD:  Soft non-tender . EXT: No edema. MS: No obvious deformities. SKIN: No obvious skin rashes. NEURO: No focal neurologic deficits. Labs/ Ancillary data:     CBC:   Recent Labs      12/03/18   0822 12/04/18   0657  12/05/18   0654   WBC  6.6  8.0  6.2   HGB  8.6*  8.7*  8.5*   PLT  101*  138  151     BMP:  Recent Labs      12/03/18   0822  12/04/18   0657  12/05/18   0654   NA  139  141  139   K  4.4  4.1  4.6   CL  97*  98  97*   CO2  29  30  30   BUN  23  29*  28*   CREATININE  1.85*  2.07*  1.76*   GLUCOSE  99  98  108*       Imaging:    CXR: + increased vascular congestion. Impression :     Paroxysmal atrial fibrillation - poor candidate for anticoagulation. Chronic diastolic heart failure. KRISTIN on CKD III  Chronic respiratory failure. Plan :     From patient's history, I would not recommend anticoagulation at this time. I have discussed with patient about the risks of stroke in the past and agin today. Diuresis per Nephrology service. Continue Amiodarone for now. Thank you very much for allowing us to participate in the care of this patient. Please call us with any questions.     Electronically signed by Tori Allen MD on 12/5/2018 at 8:52 AM
multiple core murmur conditions and very poor performance status. At this time elected simply to obtain 24-hour urine for light chain analysis, we'll quantitate her globulin levels, no mass for a limited skeletal survey. This patient may have both myeloma and this seems unlikely to me at this time. Furthermore finding multiple myeloma this patient may not be helpful and she may not tolerate treatments given her poor performance status multiple comorbid conditions and deconditioning. We'll follow with you.     Patient Active Problem List   Diagnosis Code    Controlled type 2 diabetes mellitus with stage 3 chronic kidney disease, without long-term current use of insulin ([facility]) E11.22, N18.3    Hyperlipidemia E78.5    Essential hypertension I10    Chronic leg pain M79.606, G89.29    Chronic atrial fibrillation ([facility]) I48.2    Asthma J45.909    Gastroesophageal reflux disease without esophagitis K21.9    ECTOR on CPAP G47.33, Z99.89    Type 2 diabetes mellitus without complication, without long-term current use of insulin ([facility]) E11.9    Spinal stenosis in cervical region M48.02    Hypomagnesemia E83.42    Hyperphosphaturia E83.39    Hypocalcemia E83.51    Parkinson's disease ([facility] [address] G20    Acute renal failure ([facility]) N17.9    Acute cystitis with hematuria N30.01    Altered mental status R41.82    Iron deficiency anemia due to chronic blood loss D50.0    Morbid obesity with BMI of 40.0-44.9, adult ([facility]) E66.01, Z68.41    Hyperplastic polyp of intestine K63.5    Diverticulosis K57.90    Panlobular emphysema ([facility]) J43.1    Rapid atrial fibrillation ([facility]) I48.91    CKD (chronic kidney disease) stage 3, GFR 30-59 ml/min ([facility]) N18.3    KRISTIN (acute kidney injury) ([facility]) N17.9    Anemia in chronic kidney disease N18.9, D63.1    Chronic respiratory failure with hypoxia and hypercapnia ([facility]) J96.11, J96.12    CKD stage 3 due to type 2 diabetes mellitus ([facility]) E11.22, N18.3    E. coli UTI (urinary tract

## 2018-12-05 NOTE — PROGRESS NOTES
Susceptibility    Escherichia coli - ERIN     amikacin NOT REPORTED       ampicillin >=32 RESISTANT Resistant      ampicillin-sulbactam NOT REPORTED       aztreonam >=64 RESISTANT Resistant      ceFAZolin Value in next row Resistant       >=64 RESISTANTCefazolin sensitivity results can be used to predict the effectiveness of oral cephalosporins (eg. Cephalexin) in uncomplicated Urinary Tract Infections due to E. coli, K. pneumoniae, and P. mirabilis     cefepime Value in next row Resistant       >=64 RESISTANTCefazolin sensitivity results can be used to predict the effectiveness of oral cephalosporins (eg. Cephalexin) in uncomplicated Urinary Tract Infections due to E. coli, K. pneumoniae, and P. mirabilis     cefTRIAXone Value in next row Resistant       >=64 RESISTANTCefazolin sensitivity results can be used to predict the effectiveness of oral cephalosporins (eg. Cephalexin) in uncomplicated Urinary Tract Infections due to E. coli, K. pneumoniae, and P. mirabilis     ciprofloxacin Value in next row Resistant       >=64 RESISTANTCefazolin sensitivity results can be used to predict the effectiveness of oral cephalosporins (eg. Cephalexin) in uncomplicated Urinary Tract Infections due to E. coli, K. pneumoniae, and P. mirabilis     ertapenem Value in next row        >=64 RESISTANTCefazolin sensitivity results can be used to predict the effectiveness of oral cephalosporins (eg. Cephalexin) in uncomplicated Urinary Tract Infections due to E. coli, K. pneumoniae, and P. mirabilis     Confirmatory Extended Spectrum Beta-Lactamase Value in next row Resistant       >=64 RESISTANTCefazolin sensitivity results can be used to predict the effectiveness of oral cephalosporins (eg.  Cephalexin) in uncomplicated Urinary Tract Infections due to E. coli, K. pneumoniae, and P. mirabilis     gentamicin Value in next row Sensitive       >=64 RESISTANTCefazolin sensitivity results can be used to predict the effectiveness of oral Physician Provided Reason for Exam: Port upright AP CXR - SOB Acuity: Unknown Type of Exam: Unknown FINDINGS: Moderate cardiomegaly. Mediastinum unremarkable. Mild-to-moderate edema. Bony thorax intact. Mild-to-moderate CHF     Us Retroperitoneal Limited    Result Date: 11/29/2018  EXAMINATION: RETROPERITONEAL ULTRASOUND OF THE KIDNEYS 11/29/2018 COMPARISON: None HISTORY: ORDERING SYSTEM PROVIDED HISTORY: RENAL FAILURE, ACUTE (KIDNEY INJURY) FINDINGS: Kidneys: The right kidney measures 10.0 cm in length and the left kidney measures 10.6 cm in length. There is a 1.2 cm cyst in the lower pole the left kidney. There is bilateral nephrocalcinosis without evidence for hydronephrosis or obstructive uropathy. There is increased renal cortical echogenicity consistent with medical renal disease. Findings consistent with medical renal disease. Bilateral nephrocalcinosis without evidence for obstructive uropathy. PMH:  Past Medical History:   Diagnosis Date    Acute on chronic diastolic congestive heart failure ([address]     Anesthesia complication     DIFFICULTY WAKING OP    Asthma     Atrial fibrillation ([address] 2013    Cataracts, bilateral     Cellulitis     BILAT LOWER LEGS-PT STATES IS IMPROVING    Cerebral artery occlusion with cerebral infarction [facility])     Last stroke was February 2017 w/no deficits-TOTAL OF 3    Chronic kidney disease 2013    NOT ON DIALYSIS YET    Constipation     CHRONIC    COPD (chronic obstructive pulmonary disease) ([address] 2008    PT. SEES DR. BECK. Home O2 at 2-3L/NC 24/7.    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  While intending to generate a document that actually reflects the content of the visit, the document can still have some errors including those of syntax and sound a like substitutions which may escape proof reading. It such instances, actual meaning can be extrapolated by contextual diversion.

## 2018-12-05 NOTE — PROGRESS NOTES
Dr. Gee Tolbert made aware, via perfect serve, of pt being denied to [facility], and the option for [id number].

## 2018-12-05 NOTE — PROGRESS NOTES
[facility]    Progress Note    12/5/2018    8:14 AM    Name:   Josefa Baumann  MRN:     [id number]     Acct:      [de-identified]   Room:   2023/2023-01   Day:  8  Admit Date:  11/27/2018  9:30 AM    PCP:   Tommy Butler DO  Code Status:  Full Code    Subjective:     C/C:   Chief Complaint   Patient presents with    Abdominal Pain     lower    Flank Pain     bilat    Dysuria     few days     Interval History Status: improved. Patient seen and examined at bedside. Creatinine improving. No further hematuria. Awaiting prior auth for rehab stay. Brief History:      \"The patient is a 72 y.o. Non-/non  female who presents with Abdominal Pain (lower); Flank Pain (bilat); and Dysuria (few days)   and she is admitted to the hospital for the management of  Acute cystitis   Patient with known h/o CHF, DM, Afib, CAD, CVA, COPD, HTN, HPL, PD, ECTOR, RLS as well had a recent admission for urosepsis presented to ER with progressive weakness and fatigue with associated chills and dysuria over the last 3 days, she denies any chest pain, sob, nausea, vomiting, fever or chills     In ER urine was found to have UTI and had some leukocytosis with worsening creatinine and also noted to have hypercalcemia. She was admitted for furthe management. \"       Review of Systems:     Constitutional:  negative for chills, fevers, sweats  Respiratory:  negative for cough, dyspnea on exertion, shortness of breath, wheezing  Cardiovascular:  negative for chest pain, chest pressure/discomfort, lower extremity edema, palpitations  Gastrointestinal:  negative for abdominal pain, constipation, diarrhea, nausea, vomiting  Neurological:  negative for dizziness, headache    Medications: Allergies:     Allergies   Allergen Reactions    Dye [Barium-Containing Compounds] Other (See Comments)     Cause Afib per     Pcn [Penicillins] Itching and Swelling    Bactrim [Sulfamethoxazole-Trimethoprim] Other (See Comments)     Allergic Nephritis       Current Meds:   Scheduled Meds:    insulin lispro  0-6 Units Subcutaneous TID WC    insulin lispro  0-3 Units Subcutaneous Nightly    polyethylene glycol  17 g Oral Daily    spironolactone  25 mg Oral Daily    ipratropium-albuterol  3 mL Inhalation TID    potassium chloride  20 mEq Oral Daily    sodium chloride flush  10 mL Intravenous 2 times per day    docusate sodium  100 mg Oral BID    amiodarone  200 mg Oral Daily    aspirin  81 mg Oral Daily    atorvastatin  20 mg Oral Daily    mometasone-formoterol  2 puff Inhalation BID    carbidopa-levodopa  1 tablet Oral 4x Daily    linagliptin  2.5 mg Oral Daily    metoprolol tartrate  12.5 mg Oral BID    rasagiline  1 mg Oral Daily    rOPINIRole  2 mg Oral TID     Continuous Infusions:    dextrose       PRN Meds: glucose, dextrose, glucagon (rDNA), dextrose, polyvinyl alcohol, HYDROcodone 5 mg - acetaminophen, sodium chloride flush, nicotine, ondansetron **OR** ondansetron, ALPRAZolam, melatonin    Data:     Past Medical History:   has a past medical history of Acute on chronic diastolic congestive heart failure ([facility] Anesthesia complication; Asthma; Atrial fibrillation ([facility] Cataracts, bilateral; Cellulitis; Cerebral artery occlusion with cerebral infarction ([facility] Chronic kidney disease; Constipation; COPD (chronic obstructive pulmonary disease) ([facility] Difficult intravenous access; Diverticulosis; DM2 (diabetes mellitus, type 2) ([facility] Full dentures; GERD (gastroesophageal reflux disease); Headache(784.0); [place] (hard of hearing); Hyperlipidemia; Hyperplastic polyp of intestine; Hypertension; Impaired ambulation; Kidney stone; Morbid obesity with BMI of 40.0-44.9, adult ([facility] ECTOR on CPAP; Osteoarthritis; Parkinson disease ([facility]  Restless leg syndrome; Spinal stenosis in cervical region; Type II or unspecified type diabetes mellitus without mention of

## 2018-12-05 NOTE — PROGRESS NOTES
NEPHROLOGY PROGRESS NOTE      SUBJECTIVE   Patient seen and examined. Patient was sitting comfortably alert and oriented. No nosebleeds today. Appetite is excellent. Her creatinine is stable at 1.76 mg/dL. Patient denies any burning of urine. There is no history of urinary frequency or urgency. Patient denies any blood in her sputum. Blood pressure is now better controlled. The patient was on Eliquis twice daily and that was held because of blood in the urine and nosebleed. OBJECTIVE     Vitals:    12/04/18 0745 12/04/18 1903 12/05/18 0432 12/05/18 0707   BP:  (!) 126/45  (!) 143/62   Pulse:  61  62   Resp:  16  16   Temp:  97.9 °F (36.6 °C)  98.1 °F (36.7 °C)   TempSrc:    Oral   SpO2: 99% 100%  99%   Weight:   183 lb (83 kg)    Height:         24HR INTAKE/OUTPUT:      Intake/Output Summary (Last 24 hours) at 12/05/18 1246  Last data filed at 12/05/18 2395   Gross per 24 hour   Intake              360 ml   Output              600 ml   Net             -240 ml       General appearance:Alert and awake and sitting comfortably  Respiratory[de-identified] Bilateral air entry and clear to auscultation bilaterally without any wheezes or rales or rhonchi  Cardiovascular: S1 and S2 audible no S3 or murmur  Abdomen: soft and non tender and non distended with active bowel sounds  Extremities: no significant peripheral edema  Neurological: Alert and oriented. No abnormalities of mood, affect, memory, mentation, or behavior are noted      MEDICATIONS     Scheduled Meds:    insulin lispro  0-6 Units Subcutaneous TID     insulin lispro  0-3 Units Subcutaneous Nightly    polyethylene glycol  17 g Oral Daily    spironolactone  25 mg Oral Daily    ipratropium-albuterol  3 mL Inhalation TID    potassium chloride  20 mEq Oral Daily    sodium chloride flush  10 mL Intravenous 2 times per day    docusate sodium  100 mg Oral BID    amiodarone  200 mg Oral Daily    aspirin  81 mg Oral Daily    atorvastatin  20 mg Oral

## 2018-12-06 LAB
ABSOLUTE EOS #: 0.3 K/UL (ref 0–0.4)
ABSOLUTE IMMATURE GRANULOCYTE: ABNORMAL K/UL (ref 0–0.3)
ABSOLUTE LYMPH #: 1.4 K/UL (ref 1–4.8)
ABSOLUTE MONO #: 0.6 K/UL (ref 0.2–0.8)
ANION GAP SERPL CALCULATED.3IONS-SCNC: 12 MMOL/L (ref 9–17)
BASOPHILS # BLD: 1 % (ref 0–2)
BASOPHILS ABSOLUTE: 0 K/UL (ref 0–0.2)
BUN BLDV-MCNC: 30 MG/DL (ref 8–23)
BUN/CREAT BLD: 19 (ref 9–20)
CALCIUM SERPL-MCNC: 8.8 MG/DL (ref 8.6–10.4)
CHLORIDE BLD-SCNC: 98 MMOL/L (ref 98–107)
CO2: 30 MMOL/L (ref 20–31)
CREAT SERPL-MCNC: 1.59 MG/DL (ref 0.5–0.9)
DIFFERENTIAL TYPE: ABNORMAL
EOSINOPHILS RELATIVE PERCENT: 6 % (ref 1–4)
GFR AFRICAN AMERICAN: 39 ML/MIN
GFR NON-AFRICAN AMERICAN: 32 ML/MIN
GFR SERPL CREATININE-BSD FRML MDRD: ABNORMAL ML/MIN/{1.73_M2}
GFR SERPL CREATININE-BSD FRML MDRD: ABNORMAL ML/MIN/{1.73_M2}
GLUCOSE BLD-MCNC: 101 MG/DL (ref 65–105)
GLUCOSE BLD-MCNC: 111 MG/DL (ref 70–99)
GLUCOSE BLD-MCNC: 119 MG/DL (ref 65–105)
GLUCOSE BLD-MCNC: 157 MG/DL (ref 65–105)
GLUCOSE BLD-MCNC: 83 MG/DL (ref 65–105)
HCT VFR BLD CALC: 26.3 % (ref 36–46)
HEMOGLOBIN: 8.6 G/DL (ref 12–16)
IMMATURE GRANULOCYTES: ABNORMAL %
LYMPHOCYTES # BLD: 24 % (ref 24–44)
MAGNESIUM: 1.7 MG/DL (ref 1.6–2.6)
MCH RBC QN AUTO: 32 PG (ref 26–34)
MCHC RBC AUTO-ENTMCNC: 32.9 G/DL (ref 31–37)
MCV RBC AUTO: 97.4 FL (ref 80–100)
MONOCYTES # BLD: 10 % (ref 1–7)
NRBC AUTOMATED: ABNORMAL PER 100 WBC
PDW BLD-RTO: 15.8 % (ref 11.5–14.5)
PLATELET # BLD: 155 K/UL (ref 130–400)
PLATELET ESTIMATE: ABNORMAL
PMV BLD AUTO: 7.9 FL (ref 6–12)
POTASSIUM SERPL-SCNC: 4.6 MMOL/L (ref 3.7–5.3)
RBC # BLD: 2.7 M/UL (ref 4–5.2)
RBC # BLD: ABNORMAL 10*6/UL
SEG NEUTROPHILS: 59 % (ref 36–66)
SEGMENTED NEUTROPHILS ABSOLUTE COUNT: 3.5 K/UL (ref 1.8–7.7)
SODIUM BLD-SCNC: 140 MMOL/L (ref 135–144)
WBC # BLD: 5.8 K/UL (ref 3.5–11)
WBC # BLD: ABNORMAL 10*3/UL

## 2018-12-06 PROCEDURE — 97530 THERAPEUTIC ACTIVITIES: CPT | Performed by: NURSE PRACTITIONER

## 2018-12-06 PROCEDURE — 1200000000 HC SEMI PRIVATE

## 2018-12-06 PROCEDURE — 36415 COLL VENOUS BLD VENIPUNCTURE: CPT

## 2018-12-06 PROCEDURE — 97116 GAIT TRAINING THERAPY: CPT

## 2018-12-06 PROCEDURE — 85025 COMPLETE CBC W/AUTO DIFF WBC: CPT

## 2018-12-06 PROCEDURE — 99232 SBSQ HOSP IP/OBS MODERATE 35: CPT | Performed by: INTERNAL MEDICINE

## 2018-12-06 PROCEDURE — 6370000000 HC RX 637 (ALT 250 FOR IP): Performed by: FAMILY MEDICINE

## 2018-12-06 PROCEDURE — 6370000000 HC RX 637 (ALT 250 FOR IP): Performed by: INTERNAL MEDICINE

## 2018-12-06 PROCEDURE — 97535 SELF CARE MNGMENT TRAINING: CPT | Performed by: NURSE PRACTITIONER

## 2018-12-06 PROCEDURE — 83735 ASSAY OF MAGNESIUM: CPT

## 2018-12-06 PROCEDURE — 80048 BASIC METABOLIC PNL TOTAL CA: CPT

## 2018-12-06 PROCEDURE — 82947 ASSAY GLUCOSE BLOOD QUANT: CPT

## 2018-12-06 RX ADMIN — ROPINIROLE HYDROCHLORIDE 2 MG: 2 TABLET, FILM COATED ORAL at 14:28

## 2018-12-06 RX ADMIN — DOCUSATE SODIUM 100 MG: 100 CAPSULE, LIQUID FILLED ORAL at 20:19

## 2018-12-06 RX ADMIN — AMIODARONE HYDROCHLORIDE 200 MG: 200 TABLET ORAL at 08:55

## 2018-12-06 RX ADMIN — ROPINIROLE HYDROCHLORIDE 2 MG: 2 TABLET, FILM COATED ORAL at 20:19

## 2018-12-06 RX ADMIN — LINAGLIPTIN 2.5 MG: 5 TABLET, FILM COATED ORAL at 08:54

## 2018-12-06 RX ADMIN — HYDROCODONE BITARTRATE AND ACETAMINOPHEN 1 TABLET: 5; 325 TABLET ORAL at 16:22

## 2018-12-06 RX ADMIN — CARBIDOPA AND LEVODOPA 1 TABLET: 25; 100 TABLET, EXTENDED RELEASE ORAL at 20:19

## 2018-12-06 RX ADMIN — ROPINIROLE HYDROCHLORIDE 2 MG: 2 TABLET, FILM COATED ORAL at 08:55

## 2018-12-06 RX ADMIN — HYDROCODONE BITARTRATE AND ACETAMINOPHEN 1 TABLET: 5; 325 TABLET ORAL at 22:33

## 2018-12-06 RX ADMIN — ATORVASTATIN CALCIUM 20 MG: 20 TABLET, FILM COATED ORAL at 08:55

## 2018-12-06 RX ADMIN — ASPIRIN 81 MG: 81 TABLET, COATED ORAL at 08:55

## 2018-12-06 RX ADMIN — CARBIDOPA AND LEVODOPA 1 TABLET: 25; 100 TABLET, EXTENDED RELEASE ORAL at 16:22

## 2018-12-06 RX ADMIN — METOPROLOL TARTRATE 12.5 MG: 25 TABLET ORAL at 08:54

## 2018-12-06 RX ADMIN — DOCUSATE SODIUM 100 MG: 100 CAPSULE, LIQUID FILLED ORAL at 08:55

## 2018-12-06 RX ADMIN — CARBIDOPA AND LEVODOPA 1 TABLET: 25; 100 TABLET, EXTENDED RELEASE ORAL at 08:55

## 2018-12-06 RX ADMIN — POTASSIUM CHLORIDE 20 MEQ: 40 SOLUTION ORAL at 08:55

## 2018-12-06 RX ADMIN — RASAGILINE 1 MG: 0.5 TABLET ORAL at 08:55

## 2018-12-06 RX ADMIN — METOPROLOL TARTRATE 12.5 MG: 25 TABLET ORAL at 20:21

## 2018-12-06 RX ADMIN — SPIRONOLACTONE 25 MG: 25 TABLET ORAL at 08:55

## 2018-12-06 RX ADMIN — HYDROCODONE BITARTRATE AND ACETAMINOPHEN 1 TABLET: 5; 325 TABLET ORAL at 08:55

## 2018-12-06 RX ADMIN — CARBIDOPA AND LEVODOPA 1 TABLET: 25; 100 TABLET, EXTENDED RELEASE ORAL at 14:28

## 2018-12-06 ASSESSMENT — PAIN DESCRIPTION - PROGRESSION
CLINICAL_PROGRESSION: NOT CHANGED

## 2018-12-06 ASSESSMENT — PAIN DESCRIPTION - DESCRIPTORS
DESCRIPTORS: ACHING;DISCOMFORT

## 2018-12-06 ASSESSMENT — PAIN DESCRIPTION - LOCATION
LOCATION: GENERALIZED

## 2018-12-06 ASSESSMENT — PAIN DESCRIPTION - FREQUENCY
FREQUENCY: CONTINUOUS

## 2018-12-06 ASSESSMENT — PAIN DESCRIPTION - ONSET
ONSET: ON-GOING

## 2018-12-06 ASSESSMENT — PAIN DESCRIPTION - PAIN TYPE
TYPE: CHRONIC PAIN

## 2018-12-06 ASSESSMENT — PAIN SCALES - GENERAL
PAINLEVEL_OUTOF10: 6
PAINLEVEL_OUTOF10: 7
PAINLEVEL_OUTOF10: 8
PAINLEVEL_OUTOF10: 5
PAINLEVEL_OUTOF10: 7

## 2018-12-06 ASSESSMENT — PAIN DESCRIPTION - ORIENTATION: ORIENTATION: LEFT;RIGHT

## 2018-12-06 NOTE — PROGRESS NOTES
Date:                           12/6/2018  Patient name:           Lizzy Mcdermott  Date of admission:  11/27/2018  9:30 AM  MRN:   [id number]  YOB: 1945  PCP:                           Davey Burnham DO        Reason for consult: Paraproteinemia  Subjective:   Pt seen and examined. Labs and vitals personally reviewed. Kidney function improving. Patient complains of being weak. Urine immunofixation studies show less than 500 mg of M protein. Awaiting precertification. Patient had nosebleeds. Anticoagulation discontinued. REVIEW OF SYSTEMS:  General: no fever or night sweats, Weight is stable. ENT: No double or blurred vision, no hearing problem, no dysphagia or sore throat   Respiratory: No chest pain, no shortness of breath, no cough or hemoptysis. Cardiovascular: Denies chest pain, PND or orthopnea. No L E swelling or palpitations. Gastrointestinal:    No nausea or vomiting, abdominal pain, diarrhea or constipation. Genitourinary: Denies dysuria, hematuria, frequency, urgency or incontinence. Neurological: Denies headaches, decreased LOC, no sensory or motor focal deficits. Musculoskeletal:  No arthralgia no back pain or joint swelling. Skin: There are no rashes or bleeding.   Psych: Denies hallucinations or intentions to harm self        Objective:     Vitals: BP (!) 121/44   Pulse 53   Temp 98.1 °F (36.7 °C) (Oral)   Resp 16   Ht 5' 1\" (1.549 m)   Wt 183 lb 4.8 oz (83.1 kg)   LMP 04/03/2004 (Within Years)   SpO2 100%   BMI 34.63 kg/m²   General appearance - well appearing, no in pain or distress  Mental status - AAO X3  Eyes - pupils equal and reactive, extraocular eye movements intact  Mouth - mucous membranes moist, pharynx normal without lesions  Neck - supple, no significant adenopathy  Lymphatics - no palpable lymphadenopathy, no hepatosplenomegaly  Chest - clear to auscultation, no wheezes, rales or rhonchi, symmetric air entry  Heart - normal rate, regular rhythm, normal S1, S2, no murmurs  Abdomen - soft, nontender, nondistended, no masses or organomegaly  Neurological - alert, oriented, normal speech, no focal findings or movement disorder noted  Extremities - peripheral pulses normal, no pedal edema, no clubbing or cyanosis  Skin - normal coloration and turgor, no rashes, no suspicious skin lesions noted         Data:    No intake/output data recorded. In: -   Out: 1000 [Urine:1000]    CBC:   Recent Labs      12/03/18   0822  12/04/18   0657  12/05/18   0654   WBC  6.6  8.0  6.2   HGB  8.6*  8.7*  8.5*   PLT  101*  138  151     BMP:    Recent Labs      12/03/18   0822  12/04/18   0657  12/05/18   0654   NA  139  141  139   K  4.4  4.1  4.6   CL  97*  98  97*   CO2  29  30  30   BUN  23  29*  28*   CREATININE  1.85*  2.07*  1.76*   GLUCOSE  99  98  108*     Hepatic: No results for input(s): AST, ALT, ALB, BILITOT, ALKPHOS in the last 72 hours. INR: No results for input(s): INR in the last 72 hours. PTT:No results for input(s): PTT in the last 72 hours. Results for orders placed or performed during the hospital encounter of 11/27/18   Culture Blood #1   Result Value Ref Range    Specimen Description . BLOOD     Special Requests 9cc lta     Culture NO GROWTH 6 DAYS     Status FINAL 12/03/2018    Culture Blood #1   Result Value Ref Range    Specimen Description . BLOOD     Special Requests RIGHT ARM, 10ML     Culture NO GROWTH 6 DAYS     Status FINAL 12/03/2018    Urine culture clean catch   Result Value Ref Range    Specimen Description . CLEAN CATCH URINE     Special Requests NOT REPORTED     Culture (A)      ESCHERICHIA COLI >[id number] CFU/ML THIS ORGANISM IS AN EXTENDED-SPECTRUM    Culture (A)       BETA-LACTAMASE  AND RESISTANCE TO THERAPY WITH PENICILLINS,    Culture (A)       CEPHALOSPORINS AND AZTREONAM IS EXPECTED. THESE ORGANISMS GENERALLY REMAIN    Culture (A)       SUSCEPTIBLE TO CARBAPENEMS. CONSIDER ID CONSULTATION.     Status FINAL Free Kappa/Lambda Ratio 2.24 (H) 0.26 - 1.65   KAPPA / LAMBDA LIGHT CHAINS, URINE, 24 HOUR   Result Value Ref Range    Hours Collected 24     Urine Volume 264     Protein, Total Urine 109 10 - 140 mg/d    Albumin, U Detected Detected    Alpha 1, Urine Detected None Detected    Alpha 2, Urine Detected None Detected    Beta, Urine Detected None Detected    Gamma Globulin, Urine Detected None Detected    [place] Lambda Urine Interp See Note     Free Kappa Lt Chains,Ur 31.20 (H) 0.14 - 2.42 mg/dL    Urine Free Kappa EXCRETION/Day 82.37 mg/d    Free Lambda Lt Chains,Ur 1.96 (H) 0.02 - 0.67 mg/dL    Free Ur Lambda Excretion/Day 5.17 mg/d    Kappa/Lambda Ratio,U 15.92 (H) 2.04 - 10.37 ratio   Immunoglobulin Panel (IgG, IgA, IgM)   Result Value Ref Range    IgG 576 (L) 700 - 1600 mg/dL    IgA 102 70 - 400 mg/dL    IgM 148 40 - 230 mg/dL   Basic Metabolic Panel   Result Value Ref Range    Glucose 103 (H) 70 - 99 mg/dL    BUN 27 (H) 8 - 23 mg/dL    CREATININE 2.44 (H) 0.50 - 0.90 mg/dL    Bun/Cre Ratio 11 9 - 20    Calcium 10.0 8.6 - 10.4 mg/dL    Sodium 142 135 - 144 mmol/L    Potassium 3.9 3.7 - 5.3 mmol/L    Chloride 100 98 - 107 mmol/L    CO2 31 20 - 31 mmol/L    Anion Gap 11 9 - 17 mmol/L    GFR Non-African American 20 (L) >60 mL/min    GFR  24 (L) >60 mL/min    GFR Comment          GFR Staging NOT REPORTED    CBC Auto Differential   Result Value Ref Range    WBC 7.2 3.5 - 11.0 k/uL    RBC 2.80 (L) 4.0 - 5.2 m/uL    Hemoglobin 8.8 (L) 12.0 - 16.0 g/dL    Hematocrit 27.7 (L) 36 - 46 %    MCV 99.0 80 - 100 fL    MCH 31.3 26 - 34 pg    MCHC 31.6 31 - 37 g/dL    RDW 15.2 (H) 11.5 - 14.5 %    Platelets 538 (L) 417 - 400 k/uL    MPV NOT REPORTED 6.0 - 12.0 fL    NRBC Automated NOT REPORTED per 100 WBC    Differential Type NOT REPORTED     Immature Granulocytes NOT REPORTED 0 %    Absolute Immature Granulocyte NOT REPORTED 0.00 - 0.30 k/uL    WBC Morphology NOT REPORTED     RBC Morphology NOT REPORTED     Platelet REPORTED    Magnesium   Result Value Ref Range    Magnesium 1.8 1.6 - 2.6 mg/dL   CBC Auto Differential   Result Value Ref Range    WBC 6.6 3.5 - 11.0 k/uL    RBC 2.65 (L) 4.0 - 5.2 m/uL    Hemoglobin 8.6 (L) 12.0 - 16.0 g/dL    Hematocrit 25.8 (L) 36 - 46 %    MCV 97.3 80 - 100 fL    MCH 32.5 26 - 34 pg    MCHC 33.4 31 - 37 g/dL    RDW 16.0 (H) 11.5 - 14.5 %    Platelets 214 (L) 296 - 400 k/uL    MPV 8.3 6.0 - 12.0 fL    NRBC Automated NOT REPORTED per 100 WBC    Differential Type NOT REPORTED     Immature Granulocytes NOT REPORTED 0 %    Absolute Immature Granulocyte NOT REPORTED 0.00 - 0.30 k/uL    WBC Morphology NOT REPORTED     RBC Morphology NOT REPORTED     Platelet Estimate NOT REPORTED     Seg Neutrophils 68 (H) 36 - 66 %    Lymphocytes 16 (L) 24 - 44 %    Monocytes 9 (H) 1 - 7 %    Eosinophils % 6 (H) 1 - 4 %    Basophils 1 %    Segs Absolute 4.48 1.8 - 7.7 k/uL    Absolute Lymph # 1.06 1.0 - 4.8 k/uL    Absolute Mono # 0.59 0.2 - 0.8 k/uL    Absolute Eos # 0.40 0.0 - 0.4 k/uL    Basophils # 0.07 0.0 - 0.2 k/uL    Morphology LARGE PLATELETS PRESENT    BASIC METABOLIC PANEL   Result Value Ref Range    Glucose 98 70 - 99 mg/dL    BUN 29 (H) 8 - 23 mg/dL    CREATININE 2.07 (H) 0.50 - 0.90 mg/dL    Bun/Cre Ratio 14 9 - 20    Calcium 9.2 8.6 - 10.4 mg/dL    Sodium 141 135 - 144 mmol/L    Potassium 4.1 3.7 - 5.3 mmol/L    Chloride 98 98 - 107 mmol/L    CO2 30 20 - 31 mmol/L    Anion Gap 13 9 - 17 mmol/L    GFR Non-African American 24 (L) >60 mL/min    GFR  29 (L) >60 mL/min    GFR Comment          GFR Staging NOT REPORTED    Magnesium   Result Value Ref Range    Magnesium 1.7 1.6 - 2.6 mg/dL   CBC Auto Differential   Result Value Ref Range    WBC 8.0 3.5 - 11.0 k/uL    RBC 2.63 (L) 4.0 - 5.2 m/uL    Hemoglobin 8.7 (L) 12.0 - 16.0 g/dL    Hematocrit 26.0 (L) 36 - 46 %    MCV 98.8 80 - 100 fL    MCH 33.0 26 - 34 pg    MCHC 33.4 31 - 37 g/dL    RDW 15.9 (H) 11.5 - 14.5 %    Platelets 601 183 - 115 k/uL    MPV 8.5 6.0 - 12.0 fL    NRBC Automated NOT REPORTED per 100 WBC    Differential Type NOT REPORTED     Seg Neutrophils 74 (H) 36 - 66 %    Lymphocytes 13 (L) 24 - 44 %    Monocytes 8 (H) 1 - 7 %    Eosinophils % 5 (H) 1 - 4 %    Basophils 0 0 - 2 %    Immature Granulocytes NOT REPORTED 0 %    Segs Absolute 5.90 1.8 - 7.7 k/uL    Absolute Lymph # 1.10 1.0 - 4.8 k/uL    Absolute Mono # 0.70 0.2 - 0.8 k/uL    Absolute Eos # 0.40 0.0 - 0.4 k/uL    Basophils # 0.00 0.0 - 0.2 k/uL    Absolute Immature Granulocyte NOT REPORTED 0.00 - 0.30 k/uL    WBC Morphology NOT REPORTED     RBC Morphology NOT REPORTED     Platelet Estimate NOT REPORTED    Protein / creatinine ratio, urine   Result Value Ref Range    Total Protein, Urine 42 mg/dL    Creatinine, Ur 54.6 28.0 - 217.0 mg/dL    Urine Total Protein Creatinine Ratio 0.77 (H) 0.00 - 4.73   BASIC METABOLIC PANEL   Result Value Ref Range    Glucose 108 (H) 70 - 99 mg/dL    BUN 28 (H) 8 - 23 mg/dL    CREATININE 1.76 (H) 0.50 - 0.90 mg/dL    Bun/Cre Ratio 16 9 - 20    Calcium 9.1 8.6 - 10.4 mg/dL    Sodium 139 135 - 144 mmol/L    Potassium 4.6 3.7 - 5.3 mmol/L    Chloride 97 (L) 98 - 107 mmol/L    CO2 30 20 - 31 mmol/L    Anion Gap 12 9 - 17 mmol/L    GFR Non-African American 28 (L) >60 mL/min    GFR  35 (L) >60 mL/min    GFR Comment          GFR Staging NOT REPORTED    Magnesium   Result Value Ref Range    Magnesium 1.7 1.6 - 2.6 mg/dL   CBC Auto Differential   Result Value Ref Range    WBC 6.2 3.5 - 11.0 k/uL    RBC 2.63 (L) 4.0 - 5.2 m/uL    Hemoglobin 8.5 (L) 12.0 - 16.0 g/dL    Hematocrit 25.7 (L) 36 - 46 %    MCV 97.6 80 - 100 fL    MCH 32.4 26 - 34 pg    MCHC 33.2 31 - 37 g/dL    RDW 16.1 (H) 11.5 - 14.5 %    Platelets 011 938 - 539 k/uL    MPV 7.7 6.0 - 12.0 fL    NRBC Automated NOT REPORTED per 100 WBC    Differential Type NOT REPORTED     Seg Neutrophils 63 36 - 66 %    Lymphocytes 19 (L) 24 - 44 %    Monocytes 10 (H) 1 - 7 %    Eosinophils % 7 Monoclonal gammopathy of undetermined significance [D47.2] 11/29/2018    Acute nonintractable headache [R51]     Acute kidney injury ([facility] [N17.9] 11/27/2018    Hypercalcemia [E83.52] 11/27/2018    History of ESBL E. coli infection [Z86.19]     Chronic diastolic congestive heart failure ([facility] [I50.32] 09/23/2018    Anemia of chronic renal failure, stage 4 (severe) ([facility] [N18.4, D63.1] 04/25/2018    Chronic respiratory failure with hypoxia and hypercapnia (HCC) [W67.20, J96.12] 12/17/2017    Acute cystitis with hematuria [N30.01] 12/28/2016    ECTOR on CPAP [G47.33, Z99.89]     Controlled type 2 diabetes mellitus with stage 3 chronic kidney disease, without long-term current use of insulin ([facility] [E11.22, N18.3]        Plan   Hypercalcemia resolved. Unlikely to be related to paraproteinemia  Reviewed the results of 24-hour urine electrophoresis. 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 Do not recommend bone marrow biopsy at this point. Discussedhistory. Patient will need surveillance to monitor for progression. Okay to discharge from medical oncology standpoint. Consider EMMY therapy for anemia of chronic kidney disease      [place] FRAN[place]      This note is created with the assistance of a speech recognition program.  While intending to generate a document that actually reflects the content of the visit, the document can still have some errors including those of syntax and sound a like substitutions which may escape proof reading. It such instances, actual meaning can be extrapolated by contextual diversion.

## 2018-12-06 NOTE — PROGRESS NOTES
NEPHROLOGY PROGRESS NOTE      SUBJECTIVE   Creatinine improving to 1.59. Lytes ok.  hgb 8.6. SBP trending 120-140s. No new complaints    OBJECTIVE     Vitals:    12/05/18 0707 12/05/18 1907 12/06/18 0424 12/06/18 0659   BP: (!) 143/62 (!) 138/52  (!) 121/44   Pulse: 62 68  53   Resp: 16 18  16   Temp: 98.1 °F (36.7 °C) 98.2 °F (36.8 °C)  98.1 °F (36.7 °C)   TempSrc: Oral Oral  Oral   SpO2: 99% 100%  100%   Weight:   183 lb 4.8 oz (83.1 kg)    Height:         24HR INTAKE/OUTPUT:      Intake/Output Summary (Last 24 hours) at 12/06/18 1426  Last data filed at 12/06/18 0934   Gross per 24 hour   Intake              360 ml   Output             1300 ml   Net             -940 ml       General appearance:Alert and awake and sitting comfortably  Respiratory[de-identified] Bilateral air entry and clear to auscultation bilaterally without any wheezes or rales or rhonchi  Cardiovascular: S1 and S2 audible no S3 or murmur  Abdomen: soft and non tender and non distended with active bowel sounds  Extremities: no significant peripheral edema  Neurological: Alert and oriented. No abnormalities of mood, affect, memory, mentation, or behavior are noted      MEDICATIONS     Scheduled Meds:    insulin lispro  0-6 Units Subcutaneous TID WC    insulin lispro  0-3 Units Subcutaneous Nightly    polyethylene glycol  17 g Oral Daily    spironolactone  25 mg Oral Daily    ipratropium-albuterol  3 mL Inhalation TID    potassium chloride  20 mEq Oral Daily    sodium chloride flush  10 mL Intravenous 2 times per day    docusate sodium  100 mg Oral BID    amiodarone  200 mg Oral Daily    aspirin  81 mg Oral Daily    atorvastatin  20 mg Oral Daily    mometasone-formoterol  2 puff Inhalation BID    carbidopa-levodopa  1 tablet Oral 4x Daily    linagliptin  2.5 mg Oral Daily    metoprolol tartrate  12.5 mg Oral BID    rasagiline  1 mg Oral Daily    rOPINIRole  2 mg Oral TID       INVESTIGATIONS     Last 3 CMP:    Recent Labs      12/04/18

## 2018-12-06 NOTE — PROGRESS NOTES
[facility]    Progress Note    12/6/2018    7:59 AM    Name:   Deirdre Peng  MRN:     [id number]     Acct:      [de-identified]   Room:   2023/2023-01  IP Day:  9  Admit Date:  11/27/2018  9:30 AM    PCP:   Aye Schumacher DO  Code Status:  Full Code    Subjective:     C/C:   Chief Complaint   Patient presents with    Abdominal Pain     lower    Flank Pain     bilat    Dysuria     few days     Interval History Status: improved. Patient seen and examined at bedside. Creatinine improving. No further hematuria. Prior auth denied for rehab. Awaiting [id number] discussion. Brief History:      \"The patient is a 72 y.o. Non-/non  female who presents with Abdominal Pain (lower); Flank Pain (bilat); and Dysuria (few days)   and she is admitted to the hospital for the management of  Acute cystitis   Patient with known h/o CHF, DM, Afib, CAD, CVA, COPD, HTN, HPL, PD, ECTOR, RLS as well had a recent admission for urosepsis presented to ER with progressive weakness and fatigue with associated chills and dysuria over the last 3 days, she denies any chest pain, sob, nausea, vomiting, fever or chills     In ER urine was found to have UTI and had some leukocytosis with worsening creatinine and also noted to have hypercalcemia. She was admitted for furthe management. \"       Review of Systems:     Constitutional:  negative for chills, fevers, sweats  Respiratory:  negative for cough, dyspnea on exertion, shortness of breath, wheezing  Cardiovascular:  negative for chest pain, chest pressure/discomfort, lower extremity edema, palpitations  Gastrointestinal:  negative for abdominal pain, constipation, diarrhea, nausea, vomiting  Neurological:  negative for dizziness, headache    Medications: Allergies:     Allergies   Allergen Reactions    Dye [Barium-Containing Compounds] Other (See Comments)     Cause Afib per     Pcn [Penicillins] Itching and Swelling    Bactrim [Sulfamethoxazole-Trimethoprim] Other (See Comments)     Allergic Nephritis       Current Meds:   Scheduled Meds:    insulin lispro  0-6 Units Subcutaneous TID WC    insulin lispro  0-3 Units Subcutaneous Nightly    polyethylene glycol  17 g Oral Daily    spironolactone  25 mg Oral Daily    ipratropium-albuterol  3 mL Inhalation TID    potassium chloride  20 mEq Oral Daily    sodium chloride flush  10 mL Intravenous 2 times per day    docusate sodium  100 mg Oral BID    amiodarone  200 mg Oral Daily    aspirin  81 mg Oral Daily    atorvastatin  20 mg Oral Daily    mometasone-formoterol  2 puff Inhalation BID    carbidopa-levodopa  1 tablet Oral 4x Daily    linagliptin  2.5 mg Oral Daily    metoprolol tartrate  12.5 mg Oral BID    rasagiline  1 mg Oral Daily    rOPINIRole  2 mg Oral TID     Continuous Infusions:    dextrose       PRN Meds: glucose, dextrose, glucagon (rDNA), dextrose, polyvinyl alcohol, HYDROcodone 5 mg - acetaminophen, sodium chloride flush, nicotine, ondansetron **OR** ondansetron, ALPRAZolam, melatonin    Data:     Past Medical History:   has a past medical history of Acute on chronic diastolic congestive heart failure ([facility] Anesthesia complication; Asthma; Atrial fibrillation ([facility] Cataracts, bilateral; Cellulitis; Cerebral artery occlusion with cerebral infarction ([facility] Chronic kidney disease; Constipation; COPD (chronic obstructive pulmonary disease) ([facility] Difficult intravenous access; Diverticulosis; DM2 (diabetes mellitus, type 2) ([facility] Full dentures; GERD (gastroesophageal reflux disease); Headache(784.0); [place] (hard of hearing); Hyperlipidemia; Hyperplastic polyp of intestine; Hypertension; Impaired ambulation; Kidney stone; Morbid obesity with BMI of 40.0-44.9, adult ([facility] ECTOR on CPAP; Osteoarthritis; Parkinson disease ([facility]  Restless leg syndrome; Spinal stenosis in cervical region; Type II or unspecified type diabetes mellitus without mention of complication, not stated as uncontrolled; Unspecified sleep apnea; Urethral caruncle; and Wears glasses. Social History:   reports that she quit smoking about 48 years ago. Her smoking use included Cigarettes. She has a 30.00 pack-year smoking history. She has never used smokeless tobacco. She reports that she drinks about 1.2 oz of alcohol per week . She reports that she does not use drugs. Family History:   Family History   Problem Relation Age of Onset    Heart Failure Mother     Hypertension Mother     Heart Disease Mother     High Blood Pressure Mother        Vitals:  BP (!) 121/44   Pulse 53   Temp 98.1 °F (36.7 °C) (Oral)   Resp 16   Ht 5' 1\" (1.549 m)   Wt 183 lb 4.8 oz (83.1 kg)   LMP 2004 (Within Years)   SpO2 100%   BMI 34.63 kg/m²   Temp (24hrs), Av.2 °F (36.8 °C), Min:98.1 °F (36.7 °C), Max:98.2 °F (36.8 °C)    Recent Labs      18   1135  18   1642  18   2126  18   0610   POCGLU  101  87  90  101       I/O (24Hr):     Intake/Output Summary (Last 24 hours) at 18 0759  Last data filed at 18 2259   Gross per 24 hour   Intake              720 ml   Output             1000 ml   Net             -280 ml       Labs:    Hematology:  Recent Labs      18   0822  18   0657  18   0654   WBC  6.6  8.0  6.2   RBC  2.65*  2.63*  2.63*   HGB  8.6*  8.7*  8.5*   HCT  25.8*  26.0*  25.7*   MCV  97.3  98.8  97.6   MCH  32.5  33.0  32.4   MCHC  33.4  33.4  33.2   RDW  16.0*  15.9*  16.1*   PLT  101*  138  151   MPV  8.3  8.5  7.7     Chemistry:  Recent Labs      18   0822  18   0657  18   0654   NA  139  141  139   K  4.4  4.1  4.6   CL  97*  98  97*   CO2  29  30  30   GLUCOSE  99  98  108*   BUN  23  29*  28*   CREATININE  1.85*  2.07*  1.76*   MG  1.8  1.7  1.7   ANIONGAP  13  13  12   LABGLOM  27*  24*  28*   GFRAA  33*  29*  35*   CALCIUM  9.2  9.2  9.1     Recent Labs      18

## 2018-12-06 NOTE — PROGRESS NOTES
Occupational Therapy  Facility/Department: [facility]  Daily Treatment Note  NAME: Low Denis  : 1945  MRN: [id number]    Date of Service: 2018    Discharge Recommendations:  [address]    Patient would benefit from SNF for continued occupational therapy to increase independence with  ADL of bathing, dressing, toileting and grooming. Writer recommending SNF placement for for activity tolerance and strength which will increase independence with ADL's coordinated with bed mobility and chair transfers. Continued skilled OT services to address decreased safety awareness with ADL and IADL tasks and for education and increased independence with DME and AE for fall prevention and ec/ws techniques prior to d/c home. Patient Diagnosis(es): The encounter diagnosis was KRISTIN (acute kidney injury) ([facility] has a past medical history of Acute on chronic diastolic congestive heart failure ([address] Anesthesia complication; Asthma; Atrial fibrillation ([address] Cataracts, bilateral; Cellulitis; Cerebral artery occlusion with cerebral infarction ([address] Chronic kidney disease; Constipation; COPD (chronic obstructive pulmonary disease) ([address] Difficult intravenous access; Diverticulosis; DM2 (diabetes mellitus, type 2) ([address] Full dentures; GERD (gastroesophageal reflux disease); Headache(784.0); [place] (hard of hearing); Hyperlipidemia; Hyperplastic polyp of intestine; Hypertension; Impaired ambulation; Kidney stone; Morbid obesity with BMI of 40.0-44.9, adult ([address] ECTOR on CPAP; Osteoarthritis; Parkinson disease ([address] Restless leg syndrome; Spinal stenosis in cervical region; Type II or unspecified type diabetes mellitus without mention of complication, not stated as uncontrolled; Unspecified sleep apnea; Urethral caruncle; and Wears glasses. has a past surgical history that includes Cervical disc surgery (); Appendectomy;  Tonsillectomy and adenoidectomy (0515); hernia repair (); eye surgery (Bilateral, 2017); Cervical spine surgery (5/31/13); laminectomy (05/31/2013); Upper gastrointestinal endoscopy (12/28/2016); Colonoscopy (2009); Colonoscopy (12/29/2016); Colonoscopy (12/30/2016); Colonoscopy (04/25/2017); pr colsc flx w/removal lesion by hot bx forceps (N/A, 4/25/2017); Cystorrhaphy (04/04/2018); pr cystourethroscopy,biopsies (N/A, 4/4/2018); bronchoscopy (06/05/2018); pr [facility] incl fluor gdnce dx w/cell washg spx (N/A, 6/5/2018); Upper gastrointestinal endoscopy (08/29/2018); and Upper gastrointestinal endoscopy (N/A, 8/29/2018). Restrictions  Restrictions/Precautions  Restrictions/Precautions: General Precautions, Fall Risk, Contact Precautions, Up as Tolerated  Position Activity Restriction  Other position/activity restrictions:  Up with assist, telemetry, RUE IV, bed/chair alarm  Subjective   General  Chart Reviewed: Yes  Patient assessed for rehabilitation services?: Yes  Response to previous treatment: Patient with no complaints from previous session  Family / Caregiver Present: No  Pre Treatment Pain Screening  Intervention List: Patient able to continue with treatment  Oxygen Therapy  O2 Device: Nasal cannula  Patient Observation  Observations: Upon arrival, pt standing at bedside table, chair alarm sounding   Orientation  Orientation  Overall Orientation Status: Impaired  Orientation Level: Oriented to person;Disoriented to situation;Oriented to time;Oriented to place  Objective    ADL  LE Dressing: Maximum assistance;Setup (Donning new brief, pt reports caregiver completes at home.)        Balance  Sitting Balance: Stand by assistance  Standing Balance: Minimal assistance  Standing Balance  Stand to sit: Stand by assistance  Functional Mobility  Functional - Mobility Device: Rolling Walker  Activity: To/from bathroom  Assist Level: Minimal assistance (assist with O2 lines and wires secondary to decreased safety awareness)  Toilet Transfers  Toilet - Technique: Ambulating  Equipment Used: Grab bars  Toilet Transfer: Stand by assistance     Transfers  Stand to sit: Stand by assistance                       Cognition  Overall Cognitive Status: Exceptions  Following Commands: Follows one step commands with repetition  Attention Span: Difficulty dividing attention  Memory: Decreased recall of recent events;Decreased short term memory  Safety Judgement: Decreased awareness of need for assistance;Decreased awareness of need for safety  Problem Solving: Assistance required to generate solutions;Assistance required to implement solutions;Decreased awareness of errors;Assistance required to correct errors made  Insights: Decreased awareness of deficits  Initiation: Requires cues for some  Sequencing: Requires cues for some     Perception  Overall Perceptual Status: [facility]        Additional Activities Comment  Additional Activities:      Upon writer exit, call light within reach, pt retired to chair. All lines intact and patient positioned comfortably. All patient needs addressed prior to ending therapy session. Chart reviewed prior to treatment and patient is agreeable for therapy. RN reports patient is medically stable for therapy treatment this date. Assessment   Performance deficits / Impairments: Decreased functional mobility ; Decreased ADL status; Decreased strength;Decreased safe awareness;Decreased endurance;Decreased balance  Prognosis: Good  Patient Education:  Pt educated on fall prevention in hospital setting. Pt encouraged to ALWAYS call for assistance from staff for any OOB needs. Room is organized and all fall hazards are eliminated at this time. Alarm is in place prior to end of therapy session and all patient care needs have been addressed. Pt reminded that patient SAFETY is our goal.  Patient verbalize and/or demonstrate fair engagement and understanding of edu provided.    REQUIRES OT FOLLOW UP: Yes  Activity Tolerance  Activity Tolerance: Patient Tolerated treatment well  Safety Devices  Safety Devices in place: Yes  Type of devices: Call light within reach; Left in chair;Patient at risk for falls;Nurse notified;Gait belt; All fall risk precautions in place; Chair alarm in place          Plan   Plan  Times per week: 4x/week  Current Treatment Recommendations: Strengthening, Balance Training, Functional Mobility Training, Endurance Training, Safety Education & Training, Patient/Caregiver Education & Training, Equipment Evaluation, Education, & procurement, Positioning    AM-PAC Score        AM-PAC Inpatient Daily Activity Raw Score: 18  AM-PAC Inpatient ADL T-Scale Score : 38.66  ADL Inpatient CMS 0-100% Score: 46.65  ADL Inpatient CMS G-Code Modifier : CK    Goals  Short term goals  Time Frame for Short term goals: by discharge, pt will  Short term goal 1: demo min A x 1 for ADL transfers with min cues for safety  Short term goal 2: demo min A with functional mob short distances for ADL completion with min cues for safety  Short term goal 3: demo min A with toileting routine  Short term goal 4: demo min A UB ADLs following set up at approp level  Short term goal 5: demo and verb good (-) understanding of fall prevention techs for home  Patient Goals   Patient goals : to go home       Therapy Time   Individual Concurrent Group Co-treatment   Time In 1320         Time Out 1344         Minutes [address], [place]

## 2018-12-06 NOTE — PROGRESS NOTES
endoscopy (N/A, 8/29/2018). Restrictions  Restrictions/Precautions  Restrictions/Precautions: General Precautions, Fall Risk, Contact Precautions, Up as Tolerated  Position Activity Restriction  Other position/activity restrictions: Up with assist, telemetry, RUE IV, bed/chair alarm    Subjective   General  Chart Reviewed: Yes  Subjective  Subjective: RNMarlyn reports patient is medically stable for PT treatment. General Comment  Comments: Patient is agreeable for PT treatment  Pain Screening  Patient Currently in Pain: Denies  Vital Signs  Patient Currently in Pain: Denies  Pre Treatment Pain Screening  Intervention List: Patient able to continue with treatment      Orientation  Orientation  Overall Orientation Status: Within Normal Limits    Objective   Transfers  Sit to Stand: Contact guard assistance  Stand to sit: Contact guard assistance  Bed to Chair: Moderate assistance  Stand Pivot Transfers: Contact guard assistance  Lateral Transfers: Contact guard assistance  Ambulation  Ambulation?: Yes  Ambulation 1  Surface: level tile  Device: Rolling Walker  Other Apparatus: O2  Assistance: Contact guard assistance  Quality of Gait: wide NANCY with forward flexed posture, small shuffled steps  Distance: 60'x1  Comments: AMB to just outside of door in hallway and back x3     Balance  Posture: Fair  Sitting - Static: Fair;+  Sitting - Dynamic: Fair;+  Standing - Static: Fair;-  Standing - Dynamic: Fair;-  Exercises  Comments: Seated arden LE AROM 10 reps with no SOB     Assessment   Body structures, Functions, Activity limitations: Decreased functional mobility ; Decreased ADL status; Decreased strength;Decreased safe awareness;Decreased endurance;Decreased balance  Assessment: pt motivated to move and get better.  Continued need for skilled therapy to improve strength and endurance  Activity Tolerance  Activity Tolerance: Patient Tolerated treatment well;Patient limited by fatigue;Patient limited by endurance

## 2018-12-07 LAB
ABSOLUTE EOS #: 0.3 K/UL (ref 0–0.4)
ABSOLUTE IMMATURE GRANULOCYTE: ABNORMAL K/UL (ref 0–0.3)
ABSOLUTE LYMPH #: 1.5 K/UL (ref 1–4.8)
ABSOLUTE MONO #: 0.6 K/UL (ref 0.2–0.8)
ANION GAP SERPL CALCULATED.3IONS-SCNC: 12 MMOL/L (ref 9–17)
BASOPHILS # BLD: 1 % (ref 0–2)
BASOPHILS ABSOLUTE: 0 K/UL (ref 0–0.2)
BUN BLDV-MCNC: 30 MG/DL (ref 8–23)
BUN/CREAT BLD: 19 (ref 9–20)
CALCIUM SERPL-MCNC: 9.2 MG/DL (ref 8.6–10.4)
CHLORIDE BLD-SCNC: 95 MMOL/L (ref 98–107)
CO2: 31 MMOL/L (ref 20–31)
CREAT SERPL-MCNC: 1.58 MG/DL (ref 0.5–0.9)
DIFFERENTIAL TYPE: ABNORMAL
EOSINOPHILS RELATIVE PERCENT: 5 % (ref 1–4)
GFR AFRICAN AMERICAN: 39 ML/MIN
GFR NON-AFRICAN AMERICAN: 32 ML/MIN
GFR SERPL CREATININE-BSD FRML MDRD: ABNORMAL ML/MIN/{1.73_M2}
GFR SERPL CREATININE-BSD FRML MDRD: ABNORMAL ML/MIN/{1.73_M2}
GLUCOSE BLD-MCNC: 105 MG/DL (ref 70–99)
GLUCOSE BLD-MCNC: 99 MG/DL (ref 65–105)
HCT VFR BLD CALC: 27 % (ref 36–46)
HEMOGLOBIN: 9.1 G/DL (ref 12–16)
IMMATURE GRANULOCYTES: ABNORMAL %
LYMPHOCYTES # BLD: 23 % (ref 24–44)
MAGNESIUM: 1.6 MG/DL (ref 1.6–2.6)
MCH RBC QN AUTO: 32.8 PG (ref 26–34)
MCHC RBC AUTO-ENTMCNC: 33.9 G/DL (ref 31–37)
MCV RBC AUTO: 96.8 FL (ref 80–100)
MONOCYTES # BLD: 8 % (ref 1–7)
NRBC AUTOMATED: ABNORMAL PER 100 WBC
PDW BLD-RTO: 15.2 % (ref 11.5–14.5)
PLATELET # BLD: 177 K/UL (ref 130–400)
PLATELET ESTIMATE: ABNORMAL
PMV BLD AUTO: 7.6 FL (ref 6–12)
POTASSIUM SERPL-SCNC: 4.2 MMOL/L (ref 3.7–5.3)
RBC # BLD: 2.79 M/UL (ref 4–5.2)
RBC # BLD: ABNORMAL 10*6/UL
SEG NEUTROPHILS: 63 % (ref 36–66)
SEGMENTED NEUTROPHILS ABSOLUTE COUNT: 4.4 K/UL (ref 1.8–7.7)
SODIUM BLD-SCNC: 138 MMOL/L (ref 135–144)
WBC # BLD: 6.8 K/UL (ref 3.5–11)
WBC # BLD: ABNORMAL 10*3/UL

## 2018-12-07 PROCEDURE — 97116 GAIT TRAINING THERAPY: CPT

## 2018-12-07 PROCEDURE — 1200000000 HC SEMI PRIVATE

## 2018-12-07 PROCEDURE — 6370000000 HC RX 637 (ALT 250 FOR IP): Performed by: FAMILY MEDICINE

## 2018-12-07 PROCEDURE — 80048 BASIC METABOLIC PNL TOTAL CA: CPT

## 2018-12-07 PROCEDURE — 99232 SBSQ HOSP IP/OBS MODERATE 35: CPT | Performed by: INTERNAL MEDICINE

## 2018-12-07 PROCEDURE — 83735 ASSAY OF MAGNESIUM: CPT

## 2018-12-07 PROCEDURE — 82947 ASSAY GLUCOSE BLOOD QUANT: CPT

## 2018-12-07 PROCEDURE — 6370000000 HC RX 637 (ALT 250 FOR IP): Performed by: INTERNAL MEDICINE

## 2018-12-07 PROCEDURE — 85025 COMPLETE CBC W/AUTO DIFF WBC: CPT

## 2018-12-07 PROCEDURE — 6360000002 HC RX W HCPCS: Performed by: NURSE PRACTITIONER

## 2018-12-07 PROCEDURE — 36415 COLL VENOUS BLD VENIPUNCTURE: CPT

## 2018-12-07 PROCEDURE — 97110 THERAPEUTIC EXERCISES: CPT

## 2018-12-07 PROCEDURE — 94640 AIRWAY INHALATION TREATMENT: CPT

## 2018-12-07 PROCEDURE — 2700000000 HC OXYGEN THERAPY PER DAY

## 2018-12-07 PROCEDURE — 94760 N-INVAS EAR/PLS OXIMETRY 1: CPT

## 2018-12-07 RX ORDER — ALBUTEROL SULFATE 2.5 MG/3ML
2.5 SOLUTION RESPIRATORY (INHALATION)
Status: DISCONTINUED | OUTPATIENT
Start: 2018-12-07 | End: 2018-12-10 | Stop reason: HOSPADM

## 2018-12-07 RX ADMIN — HYDROCODONE BITARTRATE AND ACETAMINOPHEN 1 TABLET: 5; 325 TABLET ORAL at 17:05

## 2018-12-07 RX ADMIN — SPIRONOLACTONE 25 MG: 25 TABLET ORAL at 08:53

## 2018-12-07 RX ADMIN — POTASSIUM CHLORIDE 20 MEQ: 40 SOLUTION ORAL at 08:54

## 2018-12-07 RX ADMIN — ASPIRIN 81 MG: 81 TABLET, COATED ORAL at 08:54

## 2018-12-07 RX ADMIN — IPRATROPIUM BROMIDE AND ALBUTEROL SULFATE 3 ML: .5; 3 SOLUTION RESPIRATORY (INHALATION) at 20:43

## 2018-12-07 RX ADMIN — MAGNESIUM OXIDE TAB 400 MG (241.3 MG ELEMENTAL MG) 400 MG: 400 (241.3 MG) TAB at 12:52

## 2018-12-07 RX ADMIN — ROPINIROLE HYDROCHLORIDE 2 MG: 2 TABLET, FILM COATED ORAL at 20:37

## 2018-12-07 RX ADMIN — DOCUSATE SODIUM 100 MG: 100 CAPSULE, LIQUID FILLED ORAL at 20:37

## 2018-12-07 RX ADMIN — ROPINIROLE HYDROCHLORIDE 2 MG: 2 TABLET, FILM COATED ORAL at 08:54

## 2018-12-07 RX ADMIN — AMIODARONE HYDROCHLORIDE 200 MG: 200 TABLET ORAL at 08:53

## 2018-12-07 RX ADMIN — Medication 2 PUFF: at 20:43

## 2018-12-07 RX ADMIN — DOCUSATE SODIUM 100 MG: 100 CAPSULE, LIQUID FILLED ORAL at 08:53

## 2018-12-07 RX ADMIN — ALBUTEROL SULFATE 2.5 MG: 2.5 SOLUTION RESPIRATORY (INHALATION) at 04:54

## 2018-12-07 RX ADMIN — RASAGILINE 1 MG: 0.5 TABLET ORAL at 08:54

## 2018-12-07 RX ADMIN — METOPROLOL TARTRATE 12.5 MG: 25 TABLET ORAL at 08:54

## 2018-12-07 RX ADMIN — LINAGLIPTIN 2.5 MG: 5 TABLET, FILM COATED ORAL at 08:53

## 2018-12-07 RX ADMIN — METOPROLOL TARTRATE 12.5 MG: 25 TABLET ORAL at 20:37

## 2018-12-07 RX ADMIN — ROPINIROLE HYDROCHLORIDE 2 MG: 2 TABLET, FILM COATED ORAL at 12:52

## 2018-12-07 RX ADMIN — CARBIDOPA AND LEVODOPA 1 TABLET: 25; 100 TABLET, EXTENDED RELEASE ORAL at 17:05

## 2018-12-07 RX ADMIN — ATORVASTATIN CALCIUM 20 MG: 20 TABLET, FILM COATED ORAL at 08:53

## 2018-12-07 RX ADMIN — CARBIDOPA AND LEVODOPA 1 TABLET: 25; 100 TABLET, EXTENDED RELEASE ORAL at 20:37

## 2018-12-07 RX ADMIN — CARBIDOPA AND LEVODOPA 1 TABLET: 25; 100 TABLET, EXTENDED RELEASE ORAL at 08:54

## 2018-12-07 RX ADMIN — CARBIDOPA AND LEVODOPA 1 TABLET: 25; 100 TABLET, EXTENDED RELEASE ORAL at 12:52

## 2018-12-07 ASSESSMENT — PAIN DESCRIPTION - DESCRIPTORS: DESCRIPTORS: ACHING;DISCOMFORT

## 2018-12-07 ASSESSMENT — PAIN DESCRIPTION - ONSET: ONSET: ON-GOING

## 2018-12-07 ASSESSMENT — PAIN DESCRIPTION - LOCATION: LOCATION: GENERALIZED

## 2018-12-07 ASSESSMENT — PAIN SCALES - GENERAL
PAINLEVEL_OUTOF10: 6
PAINLEVEL_OUTOF10: 7

## 2018-12-07 ASSESSMENT — PAIN DESCRIPTION - PROGRESSION: CLINICAL_PROGRESSION: NOT CHANGED

## 2018-12-07 ASSESSMENT — PAIN DESCRIPTION - FREQUENCY: FREQUENCY: CONTINUOUS

## 2018-12-07 ASSESSMENT — PAIN DESCRIPTION - PAIN TYPE: TYPE: CHRONIC PAIN

## 2018-12-07 NOTE — PLAN OF CARE
Problem: Falls - Risk of:  Goal: Will remain free from falls  Will remain free from falls   Outcome: Ongoing  Room free of clutter  Hourly rounding   Non-skid socks worn  Side rails up x2  Bed low and locked  Call light in reach  Instructed to call out before getting out of bed  Anticipatory needs met  Bed alarm on  Falling star at the door and on wristband    Goal: Absence of physical injury  Absence of physical injury   Outcome: Ongoing      Problem: Risk for Impaired Skin Integrity  Goal: Tissue integrity - skin and mucous membranes  Structural intactness and normal physiological function of skin and  mucous membranes.    Outcome: Ongoing  Skin assessment completed and documented  Darrel scale updated  Relieve pressure to bony prominences  Avoid shearing  Assess s/sx of infection   Turns side to side  Turns self  Turned q 2 hours  Winter care given and barrier cream applied to prevent breakdown  Heels elevated  Structural intactness and normal physiological function of skin and mucous membranes      Problem: Pain:  Goal: Pain level will decrease  Pain level will decrease   Outcome: Ongoing  Pain level assessed and rated on a 0-10 scale  Assess characteristics of pain  PRN pain medication given per pt request  Non-pharmacological interventions implemented  Report ineffective pain management to physician  Update pt and family of any changes  Pt instructed to call out with new onset of pain  Continue to monitor        Problem: IP BALANCE  Goal: LTG - Patient will maintain balance to allow for safe/functional mobility  Outcome: Ongoing

## 2018-12-07 NOTE — PROGRESS NOTES
nondistended, no masses or organomegaly  Neurological - alert, oriented, normal speech, no focal findings or movement disorder noted  Extremities - peripheral pulses normal, no pedal edema, no clubbing or cyanosis  Skin - normal coloration and turgor, no rashes, no suspicious skin lesions noted         Data:    No intake/output data recorded. In: -   Out: 400 [Urine:400]    CBC:   Recent Labs      12/05/18   0654  12/06/18   0801  12/07/18   0639   WBC  6.2  5.8  6.8   HGB  8.5*  8.6*  9.1*   PLT  151  155  177     BMP:    Recent Labs      12/05/18   0654  12/06/18   0801  12/07/18   0639   NA  139  140  138   K  4.6  4.6  4.2   CL  97*  98  95*   CO2  30  30  31   BUN  28*  30*  30*   CREATININE  1.76*  1.59*  1.58*   GLUCOSE  108*  111*  105*     Hepatic: No results for input(s): AST, ALT, ALB, BILITOT, ALKPHOS in the last 72 hours. INR: No results for input(s): INR in the last 72 hours. PTT:No results for input(s): PTT in the last 72 hours. Results for orders placed or performed during the hospital encounter of 11/27/18   Culture Blood #1   Result Value Ref Range    Specimen Description . BLOOD     Special Requests 9cc lta     Culture NO GROWTH 6 DAYS     Status FINAL 12/03/2018    Culture Blood #1   Result Value Ref Range    Specimen Description . BLOOD     Special Requests RIGHT ARM, 10ML     Culture NO GROWTH 6 DAYS     Status FINAL 12/03/2018    Urine culture clean catch   Result Value Ref Range    Specimen Description . CLEAN CATCH URINE     Special Requests NOT REPORTED     Culture (A)      ESCHERICHIA COLI >960256 CFU/ML THIS ORGANISM IS AN EXTENDED-SPECTRUM    Culture (A)       BETA-LACTAMASE  AND RESISTANCE TO THERAPY WITH PENICILLINS,    Culture (A)       CEPHALOSPORINS AND AZTREONAM IS EXPECTED. THESE ORGANISMS GENERALLY REMAIN    Culture (A)       SUSCEPTIBLE TO CARBAPENEMS. CONSIDER ID CONSULTATION.     Status FINAL 11/28/2018     Organism EC        Susceptibility    Escherichia coli - ERIN Urinary Tract Infections due to E. coli, K. pneumoniae, and P. mirabilis     meropenem Value in next row Sensitive       >=64 RESISTANTCefazolin sensitivity results can be used to predict the effectiveness of oral cephalosporins (eg. Cephalexin) in uncomplicated Urinary Tract Infections due to E. coli, K. pneumoniae, and P. mirabilis     nitrofurantoin Value in next row Sensitive       >=64 RESISTANTCefazolin sensitivity results can be used to predict the effectiveness of oral cephalosporins (eg. Cephalexin) in uncomplicated Urinary Tract Infections due to E. coli, K. pneumoniae, and P. mirabilis     tigecycline Value in next row        >=64 RESISTANTCefazolin sensitivity results can be used to predict the effectiveness of oral cephalosporins (eg. Cephalexin) in uncomplicated Urinary Tract Infections due to E. coli, K. pneumoniae, and P. mirabilis     tobramycin Value in next row Sensitive       >=64 RESISTANTCefazolin sensitivity results can be used to predict the effectiveness of oral cephalosporins (eg. Cephalexin) in uncomplicated Urinary Tract Infections due to E. coli, K. pneumoniae, and P. mirabilis     trimethoprim-sulfamethoxazole Value in next row Resistant       >=64 RESISTANTCefazolin sensitivity results can be used to predict the effectiveness of oral cephalosporins (eg. Cephalexin) in uncomplicated Urinary Tract Infections due to E. coli, K. pneumoniae, and P. mirabilis     piperacillin-tazobactam Value in next row Resistant       >=64 RESISTANTCefazolin sensitivity results can be used to predict the effectiveness of oral cephalosporins (eg.  Cephalexin) in uncomplicated Urinary Tract Infections due to E. coli, K. pneumoniae, and P. mirabilis   Basic Metabolic Panel   Result Value Ref Range    Glucose 105 (H) 70 - 99 mg/dL    BUN 42 (H) 8 - 23 mg/dL    CREATININE 3.02 (H) 0.50 - 0.90 mg/dL    Bun/Cre Ratio 14 9 - 20    Calcium 13.5 (HH) 8.6 - 10.4 mg/dL    Sodium 142 135 - 144 mmol/L    Potassium 4.0 3.7 - 5.3 mmol/L    Chloride 96 (L) 98 - 107 mmol/L    CO2 34 (H) 20 - 31 mmol/L    Anion Gap 12 9 - 17 mmol/L    GFR Non-African American 15 (L) >60 mL/min    GFR  19 (L) >60 mL/min    GFR Comment          GFR Staging NOT REPORTED    Urinalysis Reflex to Culture   Result Value Ref Range    Color, UA YELLOW YEL    Turbidity UA CLOUDY (A) CLEAR    Glucose, Ur NEGATIVE NEG    Bilirubin Urine NEGATIVE NEG    Ketones, Urine NEGATIVE NEG    Specific Gravity, UA 1.010 1.005 - 1.030    Urine Hgb 3+ (A) NEG    pH, UA 5.5 5.0 - 8.0    Protein, UA NEGATIVE NEG    Urobilinogen, Urine Normal NORM    Nitrite, Urine POSITIVE (A) NEG    Leukocyte Esterase, Urine MOD (A) NEG    Urinalysis Comments NOT REPORTED    Lactic Acid   Result Value Ref Range    Lactic Acid 0.8 0.5 - 2.2 mmol/L   Microscopic Urinalysis   Result Value Ref Range    -          WBC, UA 20 TO 50 0 - 5 /HPF    RBC, UA 10 TO 20 0 - 2 /HPF    Casts UA 2 TO 5 /LPF    Crystals UA NOT REPORTED NONE /HPF    Epithelial Cells UA 0 TO 2 0 - 5 /HPF    Renal Epithelial, Urine NOT REPORTED 0 /HPF    Bacteria, UA MANY (A) NONE    Mucus, UA NOT REPORTED NONE    Trichomonas, UA NOT REPORTED NONE    Amorphous, UA NOT REPORTED NONE    Other Observations UA NOT REPORTED NREQ    Yeast, UA NOT REPORTED NONE   SPECIMEN REJECTION   Result Value Ref Range    Specimen Source . BLOOD     Ordered Test CBC DIFF     Reason for Rejection Unable to perform testing: Specimen clotted.      - NOT REPORTED    CBC Auto Differential   Result Value Ref Range    WBC 7.6 3.5 - 11.0 k/uL    RBC 2.94 (L) 4.0 - 5.2 m/uL    Hemoglobin 9.6 (L) 12.0 - 16.0 g/dL    Hematocrit 28.4 (L) 36 - 46 %    MCV 96.7 80 - 100 fL    MCH 32.7 26 - 34 pg    MCHC 33.9 31 - 37 g/dL    RDW 14.9 (H) 11.5 - 14.5 %    Platelets 785 (L) 206 - 400 k/uL    MPV 8.4 6.0 - 12.0 fL    NRBC Automated NOT REPORTED per 100 WBC    Differential Type NOT REPORTED     Immature Granulocytes NOT REPORTED 0 %    Absolute Immature Granulocyte NOT REPORTED 0.00 - 0.30 k/uL    WBC Morphology NOT REPORTED     RBC Morphology NOT REPORTED     Platelet Estimate NOT REPORTED     Seg Neutrophils 76 (H) 36 - 66 %    Lymphocytes 16 (L) 24 - 44 %    Monocytes 7 1 - 7 %    Eosinophils % 1 1 - 4 %    Basophils 0 %    Segs Absolute 5.77 1.8 - 7.7 k/uL    Absolute Lymph # 1.22 1.0 - 4.8 k/uL    Absolute Mono # 0.53 0.2 - 0.8 k/uL    Absolute Eos # 0.08 0.0 - 0.4 k/uL    Basophils # 0.00 0.0 - 0.2 k/uL    Morphology HYPOCHROMIA PRESENT    Vitamin D 25 Hydroxy   Result Value Ref Range    Vit D, 25-Hydroxy 55.7 30.0 - 100.0 ng/mL   Electrophoresis Protein, Serum without Reflex to Immunofixation   Result Value Ref Range    Total Protein 6.0 (L) 6.4 - 8.3 g/dL    Albumin (calculated) 4.1 3.2 - 5.2 g/dL    Albumin % 68 (H) 45 - 65 %    Alpha-1-Globulin 0.2 0.1 - 0.4 g/dL    Alpha 1 % 3 3 - 6 %    Alpha-2-Globulin 0.6 0.5 - 0.9 g/dL    Alpha 2 % 11 6 - 13 %    Beta Globulin 0.5 0.5 - 1.1 g/dL    Beta Percent 9 (L) 11 - 19 %    Gamma Globulin 0.6 0.5 - 1.5 g/dL    Gamma Globulin % 10 9 - 20 %    Total Prot. Sum 6.0 (L) 6.3 - 8.2 g/dL    Total Prot. Sum,% 101 98 - 102 %    Protein Electrophoresis, Serum       A ZONE OF RESTRICTION IS PRESENT IN THE GAMMAGLOBULIN REGION. CONFIRMED BY    Pathologist ELECTRONICALLY SIGNED. Sussy Mcdonald M.D.     Comprehensive Metabolic Panel w/ Reflex to MG   Result Value Ref Range    Glucose 92 70 - 99 mg/dL    BUN 37 (H) 8 - 23 mg/dL    CREATININE 3.01 (H) 0.50 - 0.90 mg/dL    Bun/Cre Ratio 12 9 - 20    Calcium 11.9 (H) 8.6 - 10.4 mg/dL    Sodium 146 (H) 135 - 144 mmol/L    Potassium 3.4 (L) 3.7 - 5.3 mmol/L    Chloride 102 98 - 107 mmol/L    CO2 34 (H) 20 - 31 mmol/L    Anion Gap 10 9 - 17 mmol/L    Alkaline Phosphatase 42 35 - 104 U/L    ALT <5 (L) 5 - 33 U/L    AST 14 <32 U/L    Total Bilirubin 0.16 (L) 0.3 - 1.2 mg/dL    Total Protein 6.4 6.4 - 8.3 g/dL    Alb 3.9 3.5 - 5.2 g/dL    Albumin/Globulin Ratio NOT REPORTED 1.0 - 2. 5    GFR Non- 15 (L) >60 mL/min    GFR  19 (L) >60 mL/min    GFR Comment          GFR Staging NOT REPORTED    Magnesium   Result Value Ref Range    Magnesium 2.3 1.6 - 2.6 mg/dL   CBC   Result Value Ref Range    WBC 8.1 3.5 - 11.0 k/uL    RBC 2.90 (L) 4.0 - 5.2 m/uL    Hemoglobin 9.2 (L) 12.0 - 16.0 g/dL    Hematocrit 28.7 (L) 36 - 46 %    MCV 98.9 80 - 100 fL    MCH 31.8 26 - 34 pg    MCHC 32.1 31 - 37 g/dL    RDW 14.9 (H) 11.5 - 14.5 %    Platelets 972 (L) 269 - 400 k/uL    MPV NOT REPORTED 6.0 - 12.0 fL    NRBC Automated NOT REPORTED per 100 WBC   Protime-INR   Result Value Ref Range    Protime 10.1 9.7 - 11.6 sec    INR 1.0    PTH, Intact   Result Value Ref Range    Pth Intact 7.07 (L) 15.0 - 65.0 pg/mL   TSH with Reflex   Result Value Ref Range    TSH 3.13 0.30 - 5.00 mIU/L   Immunofixation serum profile   Result Value Ref Range    Serum IFX Interp       A ZONE OF RESTRICTION IS PRESENT IN THE GAMMAGLOBULIN REGION. CONFIRMED BY    Pathologist ELECTRONICALLY SIGNED. Con Keene M.D.     C3 Complement   Result Value Ref Range    Complement C3 79 (L) 90 - 180 mg/dL   C4 Complement   Result Value Ref Range    Complement C4 24 10 - 40 mg/dL   Basic Metabolic Prof   Result Value Ref Range    Glucose 90 70 - 99 mg/dL    BUN 33 (H) 8 - 23 mg/dL    CREATININE 2.70 (H) 0.50 - 0.90 mg/dL    Bun/Cre Ratio 12 9 - 20    Calcium 10.3 8.6 - 10.4 mg/dL    Sodium 141 135 - 144 mmol/L    Potassium 3.7 3.7 - 5.3 mmol/L    Chloride 100 98 - 107 mmol/L    CO2 31 20 - 31 mmol/L    Anion Gap 10 9 - 17 mmol/L    GFR Non-African American 17 (L) >60 mL/min    GFR  21 (L) >60 mL/min    GFR Comment          GFR Staging NOT REPORTED    [place]/Lambda Free Lt Chains, Serum Quant   Result Value Ref Range    Kappa Free Light Chains QNT 3.74 (H) 0.37 - 1.94 mg/dL    Lambda Free Light Chains QNT 1.67 0.57 - 2.63 mg/dL    Free Kappa/Lambda Ratio 2.24 (H) 0.26 - 1.65   KAPPA / LAMBDA LIGHT 100 WBC    Differential Type NOT REPORTED     Seg Neutrophils 74 (H) 36 - 66 %    Lymphocytes 13 (L) 24 - 44 %    Monocytes 8 (H) 1 - 7 %    Eosinophils % 5 (H) 1 - 4 %    Basophils 0 0 - 2 %    Immature Granulocytes NOT REPORTED 0 %    Segs Absolute 5.90 1.8 - 7.7 k/uL    Absolute Lymph # 1.10 1.0 - 4.8 k/uL    Absolute Mono # 0.70 0.2 - 0.8 k/uL    Absolute Eos # 0.40 0.0 - 0.4 k/uL    Basophils # 0.00 0.0 - 0.2 k/uL    Absolute Immature Granulocyte NOT REPORTED 0.00 - 0.30 k/uL    WBC Morphology NOT REPORTED     RBC Morphology NOT REPORTED     Platelet Estimate NOT REPORTED    Protein / creatinine ratio, urine   Result Value Ref Range    Total Protein, Urine 42 mg/dL    Creatinine, Ur 54.6 28.0 - 217.0 mg/dL    Urine Total Protein Creatinine Ratio 0.77 (H) 0.00 - 6.65   BASIC METABOLIC PANEL   Result Value Ref Range    Glucose 108 (H) 70 - 99 mg/dL    BUN 28 (H) 8 - 23 mg/dL    CREATININE 1.76 (H) 0.50 - 0.90 mg/dL    Bun/Cre Ratio 16 9 - 20    Calcium 9.1 8.6 - 10.4 mg/dL    Sodium 139 135 - 144 mmol/L    Potassium 4.6 3.7 - 5.3 mmol/L    Chloride 97 (L) 98 - 107 mmol/L    CO2 30 20 - 31 mmol/L    Anion Gap 12 9 - 17 mmol/L    GFR Non-African American 28 (L) >60 mL/min    GFR  35 (L) >60 mL/min    GFR Comment          GFR Staging NOT REPORTED    Magnesium   Result Value Ref Range    Magnesium 1.7 1.6 - 2.6 mg/dL   CBC Auto Differential   Result Value Ref Range    WBC 6.2 3.5 - 11.0 k/uL    RBC 2.63 (L) 4.0 - 5.2 m/uL    Hemoglobin 8.5 (L) 12.0 - 16.0 g/dL    Hematocrit 25.7 (L) 36 - 46 %    MCV 97.6 80 - 100 fL    MCH 32.4 26 - 34 pg    MCHC 33.2 31 - 37 g/dL    RDW 16.1 (H) 11.5 - 14.5 %    Platelets 260 394 - 608 k/uL    MPV 7.7 6.0 - 12.0 fL    NRBC Automated NOT REPORTED per 100 WBC    Differential Type NOT REPORTED     Seg Neutrophils 63 36 - 66 %    Lymphocytes 19 (L) 24 - 44 %    Monocytes 10 (H) 1 - 7 %    Eosinophils % 7 (H) 1 - 4 %    Basophils 1 0 - 2 %    Immature Granulocytes NOT REPORTED 0 %    Segs Absolute 3.90 1.8 - 7.7 k/uL    Absolute Lymph # 1.20 1.0 - 4.8 k/uL    Absolute Mono # 0.60 0.2 - 0.8 k/uL    Absolute Eos # 0.40 0.0 - 0.4 k/uL    Basophils # 0.00 0.0 - 0.2 k/uL    Absolute Immature Granulocyte NOT REPORTED 0.00 - 0.30 k/uL    WBC Morphology NOT REPORTED     RBC Morphology NOT REPORTED     Platelet Estimate NOT REPORTED    BASIC METABOLIC PANEL   Result Value Ref Range    Glucose 111 (H) 70 - 99 mg/dL    BUN 30 (H) 8 - 23 mg/dL    CREATININE 1.59 (H) 0.50 - 0.90 mg/dL    Bun/Cre Ratio 19 9 - 20    Calcium 8.8 8.6 - 10.4 mg/dL    Sodium 140 135 - 144 mmol/L    Potassium 4.6 3.7 - 5.3 mmol/L    Chloride 98 98 - 107 mmol/L    CO2 30 20 - 31 mmol/L    Anion Gap 12 9 - 17 mmol/L    GFR Non-African American 32 (L) >60 mL/min    GFR  39 (L) >60 mL/min    GFR Comment          GFR Staging NOT REPORTED    Magnesium   Result Value Ref Range    Magnesium 1.7 1.6 - 2.6 mg/dL   CBC Auto Differential   Result Value Ref Range    WBC 5.8 3.5 - 11.0 k/uL    RBC 2.70 (L) 4.0 - 5.2 m/uL    Hemoglobin 8.6 (L) 12.0 - 16.0 g/dL    Hematocrit 26.3 (L) 36 - 46 %    MCV 97.4 80 - 100 fL    MCH 32.0 26 - 34 pg    MCHC 32.9 31 - 37 g/dL    RDW 15.8 (H) 11.5 - 14.5 %    Platelets 560 728 - 702 k/uL    MPV 7.9 6.0 - 12.0 fL    NRBC Automated NOT REPORTED per 100 WBC    Differential Type NOT REPORTED     Seg Neutrophils 59 36 - 66 %    Lymphocytes 24 24 - 44 %    Monocytes 10 (H) 1 - 7 %    Eosinophils % 6 (H) 1 - 4 %    Basophils 1 0 - 2 %    Immature Granulocytes NOT REPORTED 0 %    Segs Absolute 3.50 1.8 - 7.7 k/uL    Absolute Lymph # 1.40 1.0 - 4.8 k/uL    Absolute Mono # 0.60 0.2 - 0.8 k/uL    Absolute Eos # 0.30 0.0 - 0.4 k/uL    Basophils # 0.00 0.0 - 0.2 k/uL    Absolute Immature Granulocyte NOT REPORTED 0.00 - 0.30 k/uL    WBC Morphology NOT REPORTED     RBC Morphology NOT REPORTED     Platelet Estimate NOT REPORTED    BASIC METABOLIC PANEL   Result Value Ref Range    Glucose 105  Controlled type 2 diabetes mellitus with stage 3 chronic kidney disease, without long-term current use of insulin (HCC) [E11.22, N18.3]        Plan   Hypercalcemia resolved. Unlikely to be related to paraproteinemia,  Was secondary to calcium supplementation   Patient likely has MGUS. Do not recommend bone marrow biopsy at this point. Patient to follow up outpatient with Dr. Babatunde Griffin  Discussed the natural history of MGUS  Patient will need surveillance to monitor for progression. Okay to discharge from medical oncology standpoint. Consider EMMY therapy for anemia of chronic kidney disease as outpatient      Rolene Sebastián      This note is created with the assistance of a speech recognition program.  While intending to generate a document that actually reflects the content of the visit, the document can still have some errors including those of syntax and sound a like substitutions which may escape proof reading. It such instances, actual meaning can be extrapolated by contextual diversion.

## 2018-12-07 NOTE — PROGRESS NOTES
Cardiovascular progress Note          Patient name: Hebert Du    YOB: 1945  Date of admission:  11/27/2018       Patient seen, examined. Previous clinical entries reviewed. All available laboratory, imaging and ancillary data reviewed. Subjective:      She has mild worsening in shortness of breath. Improved with inhaler  She  denies any new chest pain, palpitations, orthopnea or PND. Systems review:  Constitutional: No fever/chills. HENT: No headache, neck pain or neck stiffness. No sore throat or dysphagia. Gastrointestinal: No abdominal pain, nausea or vomiting. Cardiac: As Above  Respiratory: As above  Neurologic: No new focal weakness or numbness  Psychiatric: Normal mood and mentation     Examination:   Vitals: BP (!) 151/54   Pulse 65   Temp 97.8 °F (36.6 °C) (Oral)   Resp 16   Ht 5' 1\" (1.549 m)   Wt 181 lb 6.4 oz (82.3 kg)   LMP 04/03/2004 (Within Years)   SpO2 96%   BMI 34.28 kg/m²     Intake/Output Summary (Last 24 hours) at 12/07/18 0847  Last data filed at 12/06/18 2030   Gross per 24 hour   Intake              720 ml   Output             1200 ml   Net             -480 ml     General appearance: Comfortable in no apparent distress. HEENT: No pallor. No icterus  Neck: Supple. Lungs:Generally decreased breath sounds. Heart: S1,S2. No S3. Abdomen: Soft  Extremities: + peripheral edema  Skin: No obvious rashes. Musculoskeletal: No obvious deformities. Neurologic: No focal deficits.      Labs/ Ancillary data:     CBC:   Recent Labs      12/05/18   0654  12/06/18   0801  12/07/18   0639   WBC  6.2  5.8  6.8   HGB  8.5*  8.6*  9.1*   PLT  151  155  177     BMP:    Recent Labs      12/05/18   0654  12/06/18   0801  12/07/18   0639   NA  139  140  138   K  4.6  4.6  4.2   CL  97*  98  95*   CO2  30  30  31   BUN  28*  30*  30*   CREATININE  1.76*  1.59*  1.58*   GLUCOSE  108*  111*  105*     Medications:   Scheduled Meds:   insulin lispro  0-6 Units Subcutaneous TID     insulin lispro  0-3 Units Subcutaneous Nightly    polyethylene glycol  17 g Oral Daily    spironolactone  25 mg Oral Daily    ipratropium-albuterol  3 mL Inhalation TID    potassium chloride  20 mEq Oral Daily    sodium chloride flush  10 mL Intravenous 2 times per day    docusate sodium  100 mg Oral BID    amiodarone  200 mg Oral Daily    aspirin  81 mg Oral Daily    atorvastatin  20 mg Oral Daily    mometasone-formoterol  2 puff Inhalation BID    carbidopa-levodopa  1 tablet Oral 4x Daily    linagliptin  2.5 mg Oral Daily    metoprolol tartrate  12.5 mg Oral BID    rasagiline  1 mg Oral Daily    rOPINIRole  2 mg Oral TID     Continuous Infusions:   dextrose       Assessment/ Plan :   Paroxysmal atrial fibrillation - poor candidate for anticoagulation. Chronic diastolic heart failure. KRISTIN on CKD III  Chronic respiratory failure. Continue current management. Will add magnesium supplementation. Will follow. Thank you very much for allowing us to participate in the care of this patient. Please call us with any questions.     Electronically signed by Belkys Lazaro MD on 12/7/2018 at 8:47 AM'

## 2018-12-07 NOTE — PROGRESS NOTES
Dr. Neha Zazueta notified, via [facility], that the patient's  would like to speak with her either face to face or over the telephone.

## 2018-12-07 NOTE — PROGRESS NOTES
Assessment   Body structures, Functions, Activity limitations: Decreased functional mobility ; Decreased ADL status; Decreased strength;Decreased safe awareness;Decreased endurance;Decreased balance  Assessment: Pt motivated to move and get better. Several rest breaks required throughout AMB. Continued need for skilled therapy to improve strength and endurance  Activity Tolerance  Activity Tolerance: Patient Tolerated treatment well;Patient limited by fatigue;Patient limited by endurance     AM-PAC Score  AM-PAC Inpatient Mobility Raw Score : 16  AM-PAC Inpatient T-Scale Score : 40.78  Mobility Inpatient CMS 0-100% Score: 54.16  Mobility Inpatient CMS G-Code Modifier : CK     Goals  Short term goals  Time Frame for Short term goals: 12 visits  Short term goal 1: Patient will be min assist for bed mobility and transfers. Short term goal 2: Patient will amb 50 feet with RW and min assist.  Short term goal 3: Patient will have fair+ standing balance. Short term goal 4: Patient will tolerate 30 minutes of ther-ex and ther-act. Short term goal 5: Patient will be indep with HEP. Patient Goals   Patient goals : Improve ambulation    Plan    Plan  Times per week: 1-2 x/d, 5-6 d/wk  Current Treatment Recommendations: Strengthening, Balance Training, Functional Mobility Training, Transfer Training, Endurance Training, Gait Training, Neuromuscular Re-education, Home Exercise Program, Safety Education & Training, Patient/Caregiver Education & Training  Safety Devices  Type of devices: All fall risk precautions in place, Call light within reach, Chair alarm in place, Gait belt, Left in chair, Nurse notified     Therapy Time   Individual Concurrent Group Co-treatment   Time In  1332         Time Out  1412         Minutes  40                 Patient treated and note written by Jarrod Stark, Student Physical Therapist Assistant under supervision of [facility].

## 2018-12-08 LAB
ABSOLUTE EOS #: 0.2 K/UL (ref 0–0.4)
ABSOLUTE IMMATURE GRANULOCYTE: ABNORMAL K/UL (ref 0–0.3)
ABSOLUTE LYMPH #: 1.3 K/UL (ref 1–4.8)
ABSOLUTE MONO #: 0.7 K/UL (ref 0.2–0.8)
ANION GAP SERPL CALCULATED.3IONS-SCNC: 13 MMOL/L (ref 9–17)
BASOPHILS # BLD: 1 % (ref 0–2)
BASOPHILS ABSOLUTE: 0 K/UL (ref 0–0.2)
BUN BLDV-MCNC: 32 MG/DL (ref 8–23)
BUN/CREAT BLD: 20 (ref 9–20)
CALCIUM SERPL-MCNC: 8.3 MG/DL (ref 8.6–10.4)
CHLORIDE BLD-SCNC: 101 MMOL/L (ref 98–107)
CO2: 30 MMOL/L (ref 20–31)
CREAT SERPL-MCNC: 1.6 MG/DL (ref 0.5–0.9)
DIFFERENTIAL TYPE: ABNORMAL
EOSINOPHILS RELATIVE PERCENT: 4 % (ref 1–4)
GFR AFRICAN AMERICAN: 39 ML/MIN
GFR NON-AFRICAN AMERICAN: 32 ML/MIN
GFR SERPL CREATININE-BSD FRML MDRD: ABNORMAL ML/MIN/{1.73_M2}
GFR SERPL CREATININE-BSD FRML MDRD: ABNORMAL ML/MIN/{1.73_M2}
GLUCOSE BLD-MCNC: 104 MG/DL (ref 70–99)
GLUCOSE BLD-MCNC: 107 MG/DL (ref 65–105)
GLUCOSE BLD-MCNC: 108 MG/DL (ref 65–105)
GLUCOSE BLD-MCNC: 109 MG/DL (ref 65–105)
GLUCOSE BLD-MCNC: 110 MG/DL (ref 65–105)
GLUCOSE BLD-MCNC: 86 MG/DL (ref 65–105)
GLUCOSE BLD-MCNC: 98 MG/DL (ref 65–105)
HCT VFR BLD CALC: 24.2 % (ref 36–46)
HEMOGLOBIN: 7.9 G/DL (ref 12–16)
IMMATURE GRANULOCYTES: ABNORMAL %
LYMPHOCYTES # BLD: 25 % (ref 24–44)
MAGNESIUM: 1.8 MG/DL (ref 1.6–2.6)
MCH RBC QN AUTO: 32.1 PG (ref 26–34)
MCHC RBC AUTO-ENTMCNC: 32.7 G/DL (ref 31–37)
MCV RBC AUTO: 98.1 FL (ref 80–100)
MONOCYTES # BLD: 13 % (ref 1–7)
NRBC AUTOMATED: ABNORMAL PER 100 WBC
PDW BLD-RTO: 15.8 % (ref 11.5–14.5)
PLATELET # BLD: 155 K/UL (ref 130–400)
PLATELET ESTIMATE: ABNORMAL
PMV BLD AUTO: 7.5 FL (ref 6–12)
POTASSIUM SERPL-SCNC: 4.5 MMOL/L (ref 3.7–5.3)
RBC # BLD: 2.47 M/UL (ref 4–5.2)
RBC # BLD: ABNORMAL 10*6/UL
SEG NEUTROPHILS: 57 % (ref 36–66)
SEGMENTED NEUTROPHILS ABSOLUTE COUNT: 3 K/UL (ref 1.8–7.7)
SODIUM BLD-SCNC: 144 MMOL/L (ref 135–144)
WBC # BLD: 5.2 K/UL (ref 3.5–11)
WBC # BLD: ABNORMAL 10*3/UL

## 2018-12-08 PROCEDURE — 6370000000 HC RX 637 (ALT 250 FOR IP): Performed by: INTERNAL MEDICINE

## 2018-12-08 PROCEDURE — 1200000000 HC SEMI PRIVATE

## 2018-12-08 PROCEDURE — 99232 SBSQ HOSP IP/OBS MODERATE 35: CPT | Performed by: INTERNAL MEDICINE

## 2018-12-08 PROCEDURE — 6360000002 HC RX W HCPCS: Performed by: FAMILY MEDICINE

## 2018-12-08 PROCEDURE — 80048 BASIC METABOLIC PNL TOTAL CA: CPT

## 2018-12-08 PROCEDURE — 94640 AIRWAY INHALATION TREATMENT: CPT

## 2018-12-08 PROCEDURE — 97110 THERAPEUTIC EXERCISES: CPT

## 2018-12-08 PROCEDURE — 36415 COLL VENOUS BLD VENIPUNCTURE: CPT

## 2018-12-08 PROCEDURE — 97530 THERAPEUTIC ACTIVITIES: CPT

## 2018-12-08 PROCEDURE — 82947 ASSAY GLUCOSE BLOOD QUANT: CPT

## 2018-12-08 PROCEDURE — 99231 SBSQ HOSP IP/OBS SF/LOW 25: CPT | Performed by: INTERNAL MEDICINE

## 2018-12-08 PROCEDURE — 83735 ASSAY OF MAGNESIUM: CPT

## 2018-12-08 PROCEDURE — 85025 COMPLETE CBC W/AUTO DIFF WBC: CPT

## 2018-12-08 PROCEDURE — 6370000000 HC RX 637 (ALT 250 FOR IP): Performed by: FAMILY MEDICINE

## 2018-12-08 PROCEDURE — 97116 GAIT TRAINING THERAPY: CPT

## 2018-12-08 RX ADMIN — MAGNESIUM OXIDE TAB 400 MG (241.3 MG ELEMENTAL MG) 400 MG: 400 (241.3 MG) TAB at 09:25

## 2018-12-08 RX ADMIN — ONDANSETRON 4 MG: 4 TABLET, ORALLY DISINTEGRATING ORAL at 11:37

## 2018-12-08 RX ADMIN — CARBIDOPA AND LEVODOPA 1 TABLET: 25; 100 TABLET, EXTENDED RELEASE ORAL at 20:25

## 2018-12-08 RX ADMIN — LINAGLIPTIN 2.5 MG: 5 TABLET, FILM COATED ORAL at 09:25

## 2018-12-08 RX ADMIN — CARBIDOPA AND LEVODOPA 1 TABLET: 25; 100 TABLET, EXTENDED RELEASE ORAL at 13:12

## 2018-12-08 RX ADMIN — HYDROCODONE BITARTRATE AND ACETAMINOPHEN 1 TABLET: 5; 325 TABLET ORAL at 10:25

## 2018-12-08 RX ADMIN — HYDROCODONE BITARTRATE AND ACETAMINOPHEN 1 TABLET: 5; 325 TABLET ORAL at 20:25

## 2018-12-08 RX ADMIN — AMIODARONE HYDROCHLORIDE 200 MG: 200 TABLET ORAL at 09:25

## 2018-12-08 RX ADMIN — IPRATROPIUM BROMIDE AND ALBUTEROL SULFATE 3 ML: .5; 3 SOLUTION RESPIRATORY (INHALATION) at 09:47

## 2018-12-08 RX ADMIN — IPRATROPIUM BROMIDE AND ALBUTEROL SULFATE 3 ML: .5; 3 SOLUTION RESPIRATORY (INHALATION) at 15:53

## 2018-12-08 RX ADMIN — ASPIRIN 81 MG: 81 TABLET, COATED ORAL at 09:24

## 2018-12-08 RX ADMIN — ROPINIROLE HYDROCHLORIDE 2 MG: 2 TABLET, FILM COATED ORAL at 13:12

## 2018-12-08 RX ADMIN — ROPINIROLE HYDROCHLORIDE 2 MG: 2 TABLET, FILM COATED ORAL at 20:25

## 2018-12-08 RX ADMIN — ROPINIROLE HYDROCHLORIDE 2 MG: 2 TABLET, FILM COATED ORAL at 09:24

## 2018-12-08 RX ADMIN — RASAGILINE 1 MG: 0.5 TABLET ORAL at 09:24

## 2018-12-08 RX ADMIN — DOCUSATE SODIUM 100 MG: 100 CAPSULE, LIQUID FILLED ORAL at 09:24

## 2018-12-08 RX ADMIN — CARBIDOPA AND LEVODOPA 1 TABLET: 25; 100 TABLET, EXTENDED RELEASE ORAL at 17:11

## 2018-12-08 RX ADMIN — SPIRONOLACTONE 25 MG: 25 TABLET ORAL at 09:24

## 2018-12-08 RX ADMIN — CARBIDOPA AND LEVODOPA 1 TABLET: 25; 100 TABLET, EXTENDED RELEASE ORAL at 09:24

## 2018-12-08 RX ADMIN — ATORVASTATIN CALCIUM 20 MG: 20 TABLET, FILM COATED ORAL at 09:24

## 2018-12-08 RX ADMIN — METOPROLOL TARTRATE 12.5 MG: 25 TABLET ORAL at 09:25

## 2018-12-08 RX ADMIN — METOPROLOL TARTRATE 12.5 MG: 25 TABLET ORAL at 20:25

## 2018-12-08 RX ADMIN — POTASSIUM CHLORIDE 20 MEQ: 40 SOLUTION ORAL at 09:24

## 2018-12-08 RX ADMIN — IPRATROPIUM BROMIDE AND ALBUTEROL SULFATE 3 ML: .5; 3 SOLUTION RESPIRATORY (INHALATION) at 20:53

## 2018-12-08 ASSESSMENT — PAIN SCALES - GENERAL
PAINLEVEL_OUTOF10: 6
PAINLEVEL_OUTOF10: 8
PAINLEVEL_OUTOF10: 7
PAINLEVEL_OUTOF10: 6

## 2018-12-08 ASSESSMENT — PAIN DESCRIPTION - PAIN TYPE
TYPE: CHRONIC PAIN

## 2018-12-08 ASSESSMENT — PAIN DESCRIPTION - PROGRESSION
CLINICAL_PROGRESSION: NOT CHANGED
CLINICAL_PROGRESSION: NOT CHANGED

## 2018-12-08 ASSESSMENT — PAIN DESCRIPTION - ONSET
ONSET: ON-GOING
ONSET: ON-GOING

## 2018-12-08 ASSESSMENT — PAIN DESCRIPTION - LOCATION
LOCATION: GENERALIZED

## 2018-12-08 ASSESSMENT — PAIN DESCRIPTION - DESCRIPTORS
DESCRIPTORS: ACHING;DISCOMFORT
DESCRIPTORS: ACHING

## 2018-12-08 ASSESSMENT — PAIN DESCRIPTION - FREQUENCY
FREQUENCY: CONTINUOUS
FREQUENCY: CONTINUOUS

## 2018-12-08 NOTE — PROGRESS NOTES
Call placed to [facility] with [facility] to discuss discharge planning. Patient has not yet been precerted for placement. Will need to follow Monday for placement.

## 2018-12-09 LAB
ABSOLUTE EOS #: 0.3 K/UL (ref 0–0.4)
ABSOLUTE IMMATURE GRANULOCYTE: ABNORMAL K/UL (ref 0–0.3)
ABSOLUTE LYMPH #: 1.4 K/UL (ref 1–4.8)
ABSOLUTE MONO #: 0.7 K/UL (ref 0.2–0.8)
ANION GAP SERPL CALCULATED.3IONS-SCNC: 13 MMOL/L (ref 9–17)
BASOPHILS # BLD: 1 % (ref 0–2)
BASOPHILS ABSOLUTE: 0 K/UL (ref 0–0.2)
BUN BLDV-MCNC: 33 MG/DL (ref 8–23)
BUN/CREAT BLD: 18 (ref 9–20)
CALCIUM SERPL-MCNC: 8.8 MG/DL (ref 8.6–10.4)
CHLORIDE BLD-SCNC: 98 MMOL/L (ref 98–107)
CO2: 28 MMOL/L (ref 20–31)
CREAT SERPL-MCNC: 1.86 MG/DL (ref 0.5–0.9)
DIFFERENTIAL TYPE: ABNORMAL
EOSINOPHILS RELATIVE PERCENT: 5 % (ref 1–4)
GFR AFRICAN AMERICAN: 32 ML/MIN
GFR NON-AFRICAN AMERICAN: 27 ML/MIN
GFR SERPL CREATININE-BSD FRML MDRD: ABNORMAL ML/MIN/{1.73_M2}
GFR SERPL CREATININE-BSD FRML MDRD: ABNORMAL ML/MIN/{1.73_M2}
GLUCOSE BLD-MCNC: 103 MG/DL (ref 70–99)
GLUCOSE BLD-MCNC: 104 MG/DL (ref 65–105)
GLUCOSE BLD-MCNC: 114 MG/DL (ref 65–105)
GLUCOSE BLD-MCNC: 149 MG/DL (ref 65–105)
GLUCOSE BLD-MCNC: 96 MG/DL (ref 65–105)
HCT VFR BLD CALC: 25.9 % (ref 36–46)
HEMOGLOBIN: 8.5 G/DL (ref 12–16)
IMMATURE GRANULOCYTES: ABNORMAL %
LYMPHOCYTES # BLD: 21 % (ref 24–44)
MAGNESIUM: 1.7 MG/DL (ref 1.6–2.6)
MCH RBC QN AUTO: 32.1 PG (ref 26–34)
MCHC RBC AUTO-ENTMCNC: 32.8 G/DL (ref 31–37)
MCV RBC AUTO: 97.7 FL (ref 80–100)
MONOCYTES # BLD: 10 % (ref 1–7)
NRBC AUTOMATED: ABNORMAL PER 100 WBC
PDW BLD-RTO: 15.7 % (ref 11.5–14.5)
PLATELET # BLD: 181 K/UL (ref 130–400)
PLATELET ESTIMATE: ABNORMAL
PMV BLD AUTO: 7.8 FL (ref 6–12)
POTASSIUM SERPL-SCNC: 4.9 MMOL/L (ref 3.7–5.3)
RBC # BLD: 2.65 M/UL (ref 4–5.2)
RBC # BLD: ABNORMAL 10*6/UL
SEG NEUTROPHILS: 63 % (ref 36–66)
SEGMENTED NEUTROPHILS ABSOLUTE COUNT: 4.2 K/UL (ref 1.8–7.7)
SODIUM BLD-SCNC: 139 MMOL/L (ref 135–144)
WBC # BLD: 6.6 K/UL (ref 3.5–11)
WBC # BLD: ABNORMAL 10*3/UL

## 2018-12-09 PROCEDURE — 6370000000 HC RX 637 (ALT 250 FOR IP): Performed by: INTERNAL MEDICINE

## 2018-12-09 PROCEDURE — 80048 BASIC METABOLIC PNL TOTAL CA: CPT

## 2018-12-09 PROCEDURE — 6370000000 HC RX 637 (ALT 250 FOR IP): Performed by: FAMILY MEDICINE

## 2018-12-09 PROCEDURE — 99231 SBSQ HOSP IP/OBS SF/LOW 25: CPT | Performed by: INTERNAL MEDICINE

## 2018-12-09 PROCEDURE — 6360000002 HC RX W HCPCS: Performed by: FAMILY MEDICINE

## 2018-12-09 PROCEDURE — 36415 COLL VENOUS BLD VENIPUNCTURE: CPT

## 2018-12-09 PROCEDURE — 85025 COMPLETE CBC W/AUTO DIFF WBC: CPT

## 2018-12-09 PROCEDURE — 94760 N-INVAS EAR/PLS OXIMETRY 1: CPT

## 2018-12-09 PROCEDURE — 82947 ASSAY GLUCOSE BLOOD QUANT: CPT

## 2018-12-09 PROCEDURE — 94640 AIRWAY INHALATION TREATMENT: CPT

## 2018-12-09 PROCEDURE — 83735 ASSAY OF MAGNESIUM: CPT

## 2018-12-09 PROCEDURE — 1200000000 HC SEMI PRIVATE

## 2018-12-09 PROCEDURE — 2700000000 HC OXYGEN THERAPY PER DAY

## 2018-12-09 RX ORDER — FUROSEMIDE 40 MG/1
40 TABLET ORAL DAILY
Status: DISCONTINUED | OUTPATIENT
Start: 2018-12-09 | End: 2018-12-10 | Stop reason: HOSPADM

## 2018-12-09 RX ADMIN — ROPINIROLE HYDROCHLORIDE 2 MG: 2 TABLET, FILM COATED ORAL at 14:04

## 2018-12-09 RX ADMIN — IPRATROPIUM BROMIDE AND ALBUTEROL SULFATE 3 ML: .5; 3 SOLUTION RESPIRATORY (INHALATION) at 14:16

## 2018-12-09 RX ADMIN — ROPINIROLE HYDROCHLORIDE 2 MG: 2 TABLET, FILM COATED ORAL at 08:27

## 2018-12-09 RX ADMIN — IPRATROPIUM BROMIDE AND ALBUTEROL SULFATE 3 ML: .5; 3 SOLUTION RESPIRATORY (INHALATION) at 09:58

## 2018-12-09 RX ADMIN — HYDROCODONE BITARTRATE AND ACETAMINOPHEN 1 TABLET: 5; 325 TABLET ORAL at 04:34

## 2018-12-09 RX ADMIN — CARBIDOPA AND LEVODOPA 1 TABLET: 25; 100 TABLET, EXTENDED RELEASE ORAL at 08:27

## 2018-12-09 RX ADMIN — FUROSEMIDE 40 MG: 40 TABLET ORAL at 16:15

## 2018-12-09 RX ADMIN — ROPINIROLE HYDROCHLORIDE 2 MG: 2 TABLET, FILM COATED ORAL at 21:00

## 2018-12-09 RX ADMIN — CARBIDOPA AND LEVODOPA 1 TABLET: 25; 100 TABLET, EXTENDED RELEASE ORAL at 21:00

## 2018-12-09 RX ADMIN — IPRATROPIUM BROMIDE AND ALBUTEROL SULFATE 3 ML: .5; 3 SOLUTION RESPIRATORY (INHALATION) at 21:03

## 2018-12-09 RX ADMIN — METOPROLOL TARTRATE 12.5 MG: 25 TABLET ORAL at 08:28

## 2018-12-09 RX ADMIN — LINAGLIPTIN 2.5 MG: 5 TABLET, FILM COATED ORAL at 08:27

## 2018-12-09 RX ADMIN — SPIRONOLACTONE 25 MG: 25 TABLET ORAL at 08:27

## 2018-12-09 RX ADMIN — MAGNESIUM OXIDE TAB 400 MG (241.3 MG ELEMENTAL MG) 400 MG: 400 (241.3 MG) TAB at 08:28

## 2018-12-09 RX ADMIN — HYDROCODONE BITARTRATE AND ACETAMINOPHEN 1 TABLET: 5; 325 TABLET ORAL at 21:07

## 2018-12-09 RX ADMIN — Medication 2 PUFF: at 09:58

## 2018-12-09 RX ADMIN — RASAGILINE 1 MG: 0.5 TABLET ORAL at 08:27

## 2018-12-09 RX ADMIN — CARBIDOPA AND LEVODOPA 1 TABLET: 25; 100 TABLET, EXTENDED RELEASE ORAL at 16:16

## 2018-12-09 RX ADMIN — CARBIDOPA AND LEVODOPA 1 TABLET: 25; 100 TABLET, EXTENDED RELEASE ORAL at 11:47

## 2018-12-09 RX ADMIN — ATORVASTATIN CALCIUM 20 MG: 20 TABLET, FILM COATED ORAL at 08:28

## 2018-12-09 RX ADMIN — AMIODARONE HYDROCHLORIDE 200 MG: 200 TABLET ORAL at 08:28

## 2018-12-09 RX ADMIN — POTASSIUM CHLORIDE 20 MEQ: 40 SOLUTION ORAL at 08:28

## 2018-12-09 RX ADMIN — ASPIRIN 81 MG: 81 TABLET, COATED ORAL at 08:40

## 2018-12-09 RX ADMIN — ONDANSETRON 4 MG: 4 TABLET, ORALLY DISINTEGRATING ORAL at 11:47

## 2018-12-09 RX ADMIN — DOCUSATE SODIUM 100 MG: 100 CAPSULE, LIQUID FILLED ORAL at 08:27

## 2018-12-09 RX ADMIN — METOPROLOL TARTRATE 12.5 MG: 25 TABLET ORAL at 21:00

## 2018-12-09 ASSESSMENT — PAIN DESCRIPTION - FREQUENCY
FREQUENCY: CONTINUOUS

## 2018-12-09 ASSESSMENT — PAIN DESCRIPTION - LOCATION
LOCATION: GENERALIZED

## 2018-12-09 ASSESSMENT — PAIN DESCRIPTION - DESCRIPTORS
DESCRIPTORS: ACHING

## 2018-12-09 ASSESSMENT — PAIN SCALES - GENERAL
PAINLEVEL_OUTOF10: 4

## 2018-12-09 ASSESSMENT — PAIN DESCRIPTION - ONSET
ONSET: ON-GOING

## 2018-12-09 ASSESSMENT — PAIN DESCRIPTION - PROGRESSION
CLINICAL_PROGRESSION: NOT CHANGED

## 2018-12-09 ASSESSMENT — PAIN DESCRIPTION - PAIN TYPE
TYPE: CHRONIC PAIN

## 2018-12-09 NOTE — PROGRESS NOTES
apnea; Urethral caruncle; and Wears glasses. Social History:   reports that she quit smoking about 48 years ago. Her smoking use included Cigarettes. She has a 30.00 pack-year smoking history. She has never used smokeless tobacco. She reports that she drinks about 1.2 oz of alcohol per week . She reports that she does not use drugs. Family History:   Family History   Problem Relation Age of Onset    Heart Failure Mother     Hypertension Mother     Heart Disease Mother     High Blood Pressure Mother        Vitals:  BP (!) 150/53   Pulse 60   Temp 98.2 °F (36.8 °C) (Oral)   Resp 20   Ht 5' 1\" (1.549 m)   Wt 182 lb 4.8 oz (82.7 kg)   LMP 2004 (Within Years)   SpO2 98%   BMI 34.45 kg/m²   Temp (24hrs), Av.9 °F (36.6 °C), Min:97.5 °F (36.4 °C), Max:98.2 °F (36.8 °C)    Recent Labs      18   1135  18   1633  18   2113  18   0723   POCGLU  110*  86  108*  96       I/O (24Hr):     Intake/Output Summary (Last 24 hours) at 18 0816  Last data filed at 18 0721   Gross per 24 hour   Intake              480 ml   Output             1150 ml   Net             -670 ml       Labs:    Hematology:  Recent Labs      18   0639  18   0539  18   0641   WBC  6.8  5.2  6.6   RBC  2.79*  2.47*  2.65*   HGB  9.1*  7.9*  8.5*   HCT  27.0*  24.2*  25.9*   MCV  96.8  98.1  97.7   MCH  32.8  32.1  32.1   MCHC  33.9  32.7  32.8   RDW  15.2*  15.8*  15.7*   PLT  177  155  181   MPV  7.6  7.5  7.8     Chemistry:  Recent Labs      18   0639  18   0539  18   0641   NA  138  144  139   K  4.2  4.5  4.9   CL  95*  101  98   CO2  31  30  28   GLUCOSE  105*  104*  103*   BUN  30*  32*  33*   CREATININE  1.58*  1.60*  1.86*   MG  1.6  1.8  1.7   ANIONGAP  12  13  13   LABGLOM  32*  32*  27*   GFRAA  39*  39*  32*   CALCIUM  9.2  8.3*  8.8     Recent Labs      18   2035  18   6787  18   1135  18   1633  18   2113  18   7482 POCGLU  98  107*  110*  86  108*  96         Lab Results   Component Value Date/Time    SPECIAL RIGHT ARM, 10ML 11/27/2018 10:20 AM     Lab Results   Component Value Date/Time    CULTURE NO GROWTH 6 DAYS 11/27/2018 10:20 AM       Lab Results   Component Value Date    POCPH 7.46 09/22/2018    POCPCO2 51 09/22/2018    POCPO2 73 09/22/2018    POCHCO3 36.0 09/22/2018    NBEA NOT REPORTED 09/22/2018    PBEA 12 09/22/2018    YDX5TMH 38 09/22/2018    RGGX8IBK 95 09/22/2018    FIO2 NOT REPORTED 10/06/2018       Radiology:  CT OF THE ABDOMEN AND PELVIS WITHOUT CONTRAST 11/27/2018 4:35 pm    Nonobstructing bilateral nephrolithiasis. Gallbladder sludge. Increased stool burden especially in the rectum measuring up to 7 cm. This  could be due to constipation. Fecal impaction could also be considered.       Physical Examination:        General appearance:  alert, cooperative and no distress, nasal canula  Lungs:  minimal bibasilar crackles  ular rate and rhythm, no murmur  Abdomen:  soft, nontender, nondistended, normal bowel sounds, no masses, hepatomegaly, splenomegaly  Extremities:  no edema, redness, tenderness in the calves  Skin:  no gross lesions, rashes, induration    Assessment:        Primary Problem  Acute kidney injury [facility])    Active Hospital Problems    Diagnosis Date Noted    Monoclonal gammopathy of undetermined significance [D47.2] 11/29/2018    Acute nonintractable headache [R51]     Acute kidney injury ([address] [N17.9] 11/27/2018    Hypercalcemia [E83.52] 11/27/2018    History of ESBL E. coli infection [Z86.19]     Chronic diastolic congestive heart failure ([address] [I50.32] 09/23/2018    Anemia of chronic renal failure, stage 4 (severe) ([address] [N18.4, D63.1] 04/25/2018    Chronic respiratory failure with hypoxia and hypercapnia (HCC) [Z59.06, J96.12] 12/17/2017    Acute cystitis with hematuria [N30.01] 12/28/2016    ECTOR on CPAP [G47.33, Z99.89]     Controlled type 2 diabetes mellitus with stage 3

## 2018-12-10 VITALS
TEMPERATURE: 98.6 F | RESPIRATION RATE: 16 BRPM | SYSTOLIC BLOOD PRESSURE: 127 MMHG | DIASTOLIC BLOOD PRESSURE: 46 MMHG | WEIGHT: 185.2 LBS | HEART RATE: 66 BPM | HEIGHT: 61 IN | BODY MASS INDEX: 34.97 KG/M2 | OXYGEN SATURATION: 100 %

## 2018-12-10 LAB
ABSOLUTE EOS #: 0.3 K/UL (ref 0–0.4)
ABSOLUTE IMMATURE GRANULOCYTE: ABNORMAL K/UL (ref 0–0.3)
ABSOLUTE LYMPH #: 1.4 K/UL (ref 1–4.8)
ABSOLUTE MONO #: 0.5 K/UL (ref 0.2–0.8)
ANION GAP SERPL CALCULATED.3IONS-SCNC: 13 MMOL/L (ref 9–17)
BASOPHILS # BLD: 0 % (ref 0–2)
BASOPHILS ABSOLUTE: 0 K/UL (ref 0–0.2)
BUN BLDV-MCNC: 35 MG/DL (ref 8–23)
BUN/CREAT BLD: 19 (ref 9–20)
CALCIUM SERPL-MCNC: 8.4 MG/DL (ref 8.6–10.4)
CHLORIDE BLD-SCNC: 99 MMOL/L (ref 98–107)
CO2: 30 MMOL/L (ref 20–31)
CREAT SERPL-MCNC: 1.83 MG/DL (ref 0.5–0.9)
DIFFERENTIAL TYPE: ABNORMAL
EOSINOPHILS RELATIVE PERCENT: 5 % (ref 1–4)
GFR AFRICAN AMERICAN: 33 ML/MIN
GFR NON-AFRICAN AMERICAN: 27 ML/MIN
GFR SERPL CREATININE-BSD FRML MDRD: ABNORMAL ML/MIN/{1.73_M2}
GFR SERPL CREATININE-BSD FRML MDRD: ABNORMAL ML/MIN/{1.73_M2}
GLUCOSE BLD-MCNC: 111 MG/DL (ref 65–105)
GLUCOSE BLD-MCNC: 133 MG/DL (ref 65–105)
GLUCOSE BLD-MCNC: 161 MG/DL (ref 70–99)
HCT VFR BLD CALC: 23.2 % (ref 36–46)
HEMOGLOBIN: 7.8 G/DL (ref 12–16)
IMMATURE GRANULOCYTES: ABNORMAL %
LYMPHOCYTES # BLD: 22 % (ref 24–44)
MAGNESIUM: 1.8 MG/DL (ref 1.6–2.6)
MCH RBC QN AUTO: 32.5 PG (ref 26–34)
MCHC RBC AUTO-ENTMCNC: 33.4 G/DL (ref 31–37)
MCV RBC AUTO: 97.3 FL (ref 80–100)
MONOCYTES # BLD: 8 % (ref 1–7)
NRBC AUTOMATED: ABNORMAL PER 100 WBC
PDW BLD-RTO: 16 % (ref 11.5–14.5)
PLATELET # BLD: 151 K/UL (ref 130–400)
PLATELET ESTIMATE: ABNORMAL
PMV BLD AUTO: 7.8 FL (ref 6–12)
POTASSIUM SERPL-SCNC: 4.2 MMOL/L (ref 3.7–5.3)
RBC # BLD: 2.39 M/UL (ref 4–5.2)
RBC # BLD: ABNORMAL 10*6/UL
SEG NEUTROPHILS: 65 % (ref 36–66)
SEGMENTED NEUTROPHILS ABSOLUTE COUNT: 4 K/UL (ref 1.8–7.7)
SODIUM BLD-SCNC: 142 MMOL/L (ref 135–144)
WBC # BLD: 6.3 K/UL (ref 3.5–11)
WBC # BLD: ABNORMAL 10*3/UL

## 2018-12-10 PROCEDURE — 80048 BASIC METABOLIC PNL TOTAL CA: CPT

## 2018-12-10 PROCEDURE — 94640 AIRWAY INHALATION TREATMENT: CPT

## 2018-12-10 PROCEDURE — 6360000002 HC RX W HCPCS: Performed by: FAMILY MEDICINE

## 2018-12-10 PROCEDURE — 6370000000 HC RX 637 (ALT 250 FOR IP): Performed by: INTERNAL MEDICINE

## 2018-12-10 PROCEDURE — 6370000000 HC RX 637 (ALT 250 FOR IP): Performed by: FAMILY MEDICINE

## 2018-12-10 PROCEDURE — 99239 HOSP IP/OBS DSCHRG MGMT >30: CPT | Performed by: INTERNAL MEDICINE

## 2018-12-10 PROCEDURE — 83735 ASSAY OF MAGNESIUM: CPT

## 2018-12-10 PROCEDURE — 97110 THERAPEUTIC EXERCISES: CPT

## 2018-12-10 PROCEDURE — 85025 COMPLETE CBC W/AUTO DIFF WBC: CPT

## 2018-12-10 PROCEDURE — 2700000000 HC OXYGEN THERAPY PER DAY

## 2018-12-10 PROCEDURE — 97530 THERAPEUTIC ACTIVITIES: CPT

## 2018-12-10 PROCEDURE — 97116 GAIT TRAINING THERAPY: CPT

## 2018-12-10 PROCEDURE — 82947 ASSAY GLUCOSE BLOOD QUANT: CPT

## 2018-12-10 PROCEDURE — 36415 COLL VENOUS BLD VENIPUNCTURE: CPT

## 2018-12-10 RX ADMIN — RASAGILINE 1 MG: 0.5 TABLET ORAL at 09:20

## 2018-12-10 RX ADMIN — SPIRONOLACTONE 25 MG: 25 TABLET ORAL at 09:20

## 2018-12-10 RX ADMIN — AMIODARONE HYDROCHLORIDE 200 MG: 200 TABLET ORAL at 09:20

## 2018-12-10 RX ADMIN — ALPRAZOLAM 0.25 MG: 0.25 TABLET ORAL at 03:07

## 2018-12-10 RX ADMIN — MAGNESIUM OXIDE TAB 400 MG (241.3 MG ELEMENTAL MG) 400 MG: 400 (241.3 MG) TAB at 09:20

## 2018-12-10 RX ADMIN — ALPRAZOLAM 0.25 MG: 0.25 TABLET ORAL at 11:01

## 2018-12-10 RX ADMIN — METOPROLOL TARTRATE 12.5 MG: 25 TABLET ORAL at 09:28

## 2018-12-10 RX ADMIN — LINAGLIPTIN 2.5 MG: 5 TABLET, FILM COATED ORAL at 09:20

## 2018-12-10 RX ADMIN — CARBIDOPA AND LEVODOPA 1 TABLET: 25; 100 TABLET, EXTENDED RELEASE ORAL at 14:13

## 2018-12-10 RX ADMIN — CARBIDOPA AND LEVODOPA 1 TABLET: 25; 100 TABLET, EXTENDED RELEASE ORAL at 09:19

## 2018-12-10 RX ADMIN — ONDANSETRON 4 MG: 4 TABLET, ORALLY DISINTEGRATING ORAL at 11:48

## 2018-12-10 RX ADMIN — ATORVASTATIN CALCIUM 20 MG: 20 TABLET, FILM COATED ORAL at 09:19

## 2018-12-10 RX ADMIN — ROPINIROLE HYDROCHLORIDE 2 MG: 2 TABLET, FILM COATED ORAL at 09:18

## 2018-12-10 RX ADMIN — FUROSEMIDE 40 MG: 40 TABLET ORAL at 09:19

## 2018-12-10 RX ADMIN — POTASSIUM CHLORIDE 20 MEQ: 40 SOLUTION ORAL at 09:21

## 2018-12-10 RX ADMIN — ASPIRIN 81 MG: 81 TABLET, COATED ORAL at 09:19

## 2018-12-10 RX ADMIN — IPRATROPIUM BROMIDE AND ALBUTEROL SULFATE 3 ML: .5; 3 SOLUTION RESPIRATORY (INHALATION) at 09:34

## 2018-12-10 ASSESSMENT — ENCOUNTER SYMPTOMS
DIARRHEA: 0
VOMITING: 0
COUGH: 0
NAUSEA: 1

## 2018-12-10 ASSESSMENT — PAIN DESCRIPTION - PAIN TYPE: TYPE: CHRONIC PAIN

## 2018-12-10 ASSESSMENT — PAIN DESCRIPTION - LOCATION: LOCATION: GENERALIZED

## 2018-12-10 ASSESSMENT — PAIN SCALES - GENERAL: PAINLEVEL_OUTOF10: 4

## 2018-12-10 NOTE — PROGRESS NOTES
Activity limitations: Decreased functional mobility ; Decreased ADL status; Decreased strength;Decreased safe awareness;Decreased endurance;Decreased balance  Assessment: Patient was able to increase her gait tolerance today but is limited by her decreased safety awareness, decreased aerobic capacity and requires assistance with most mobility. Patient is appropriate for SNF. Prognosis: Good  Activity Tolerance  Activity Tolerance: Patient Tolerated treatment well;Patient limited by fatigue;Patient limited by endurance       AM-PAC Score  AM-PAC Inpatient Mobility Raw Score : 15  AM-PAC Inpatient T-Scale Score : 39.45  Mobility Inpatient CMS 0-100% Score: 57.7  Mobility Inpatient CMS G-Code Modifier : CK          Goals  Short term goals  Time Frame for Short term goals: 12 visits  Short term goal 1: Patient will be min assist for bed mobility and transfers. Short term goal 2: Patient will amb 50 feet with RW and min assist.  Short term goal 3: Patient will have fair+ standing balance. Short term goal 4: Patient will tolerate 30 minutes of ther-ex and ther-act. Short term goal 5: Patient will be indep with HEP. Patient Goals   Patient goals : Improve ambulation    Plan    Plan  Times per week: 1-2 x/d, 5-6 d/wk  Current Treatment Recommendations: Strengthening, Balance Training, Functional Mobility Training, Transfer Training, Endurance Training, Gait Training, Neuromuscular Re-education, Home Exercise Program, Safety Education & Training, Patient/Caregiver Education & Training  Safety Devices  Type of devices:  All fall risk precautions in place, Call light within reach, Chair alarm in place, Gait belt, Left in chair, Nurse notified     Therapy Time   Individual Concurrent Group Co-treatment   Time In  7040         Time Out  1314         Minutes   [address], hospitals

## 2018-12-10 NOTE — PROGRESS NOTES
Current Meds:   Scheduled Meds:    furosemide  40 mg Oral Daily    magnesium oxide  400 mg Oral Daily    insulin lispro  0-6 Units Subcutaneous TID WC    insulin lispro  0-3 Units Subcutaneous Nightly    polyethylene glycol  17 g Oral Daily    spironolactone  25 mg Oral Daily    ipratropium-albuterol  3 mL Inhalation TID    potassium chloride  20 mEq Oral Daily    sodium chloride flush  10 mL Intravenous 2 times per day    docusate sodium  100 mg Oral BID    amiodarone  200 mg Oral Daily    aspirin  81 mg Oral Daily    atorvastatin  20 mg Oral Daily    mometasone-formoterol  2 puff Inhalation BID    carbidopa-levodopa  1 tablet Oral 4x Daily    linagliptin  2.5 mg Oral Daily    metoprolol tartrate  12.5 mg Oral BID    rasagiline  1 mg Oral Daily    rOPINIRole  2 mg Oral TID     Continuous Infusions:    dextrose       PRN Meds: albuterol, glucose, dextrose, glucagon (rDNA), dextrose, polyvinyl alcohol, HYDROcodone 5 mg - acetaminophen, sodium chloride flush, nicotine, ondansetron **OR** ondansetron, ALPRAZolam, melatonin    Data:     Past Medical History:   has a past medical history of Acute on chronic diastolic congestive heart failure ([facility] Anesthesia complication; Asthma; Atrial fibrillation ([facility] Cataracts, bilateral; Cellulitis; Cerebral artery occlusion with cerebral infarction ([facility] Chronic kidney disease; Constipation; COPD (chronic obstructive pulmonary disease) ([facility] [place] Difficult intravenous access; Diverticulosis; DM2 (diabetes mellitus, type 2) ([facility] Full dentures; GERD (gastroesophageal reflux disease); Headache(784.0); [place] (hard of hearing); Hyperlipidemia; Hyperplastic polyp of intestine; Hypertension; Impaired ambulation; Kidney stone; Morbid obesity with BMI of 40.0-44.9, adult ([facility] ECTOR on CPAP; Osteoarthritis; Parkinson disease ([facility]  Restless leg syndrome; Spinal stenosis in cervical region; Type II or unspecified type diabetes mellitus without mention of [id number] [facility]  12/09/18 2038  12/10/18   0638  12/10/18   1142   POCGLU  96  114*  104  149*  133*  111*         Lab Results   Component Value Date/Time    SPECIAL RIGHT ARM, 10ML 11/27/2018 10:20 AM     Lab Results   Component Value Date/Time    CULTURE NO GROWTH 6 DAYS 11/27/2018 10:20 AM       Lab Results   Component Value Date    POCPH 7.46 09/22/2018    POCPCO2 51 09/22/2018    POCPO2 73 09/22/2018    POCHCO3 36.0 09/22/2018    NBEA NOT REPORTED 09/22/2018    PBEA 12 09/22/2018    EXX8YEO 38 09/22/2018    WOGK5AVB 95 09/22/2018    FIO2 NOT REPORTED 10/06/2018     Radiology:  CT OF THE ABDOMEN AND PELVIS WITHOUT CONTRAST 11/27/2018 4:35 pm    Nonobstructing bilateral nephrolithiasis. Gallbladder sludge. Increased stool burden especially in the rectum measuring up to 7 cm. This  could be due to constipation. Fecal impaction could also be considered.     Physical Examination:        General appearance:  alert, cooperative and no distress, nasal canula  Lungs:  minimal bibasilar crackles  ular rate and rhythm, no murmur  Abdomen:  soft, nontender, nondistended, normal bowel sounds, no masses, hepatomegaly, splenomegaly  Extremities:  no edema, redness, tenderness in the calves  Skin:  no gross lesions, rashes, induration    Assessment:        Primary Problem  Acute kidney injury [facility])    Active Hospital Problems    Diagnosis Date Noted    Monoclonal gammopathy of undetermined significance [D47.2] 11/29/2018    Acute nonintractable headache [R51]     Acute kidney injury ([facility] [N17.9] 11/27/2018    Hypercalcemia [E83.52] 11/27/2018    History of ESBL E. coli infection [Z86.19]     Chronic diastolic congestive heart failure ([address] [I50.32] 09/23/2018    Anemia of chronic renal failure, stage 4 (severe) ([facility] [N18.4, D63.1] 04/25/2018    Chronic respiratory failure with hypoxia and hypercapnia (HCC) [H74.33, J96.12] 12/17/2017    Acute cystitis with hematuria [N30.01] 12/28/2016    ECTOR on CPAP [G47.33, Z99.89]

## 2018-12-10 NOTE — PLAN OF CARE
Problem: Discharge Planning:  Intervention: Assess knowledge level of healthcare  Pt.  is aware of the planned transfer today. Goal: Discharged to appropriate level of care  Discharged to appropriate level of care   Outcome: Met This Shift  Pt. Has been approved for transfer to  [facility] for additional   Treatment/OT/PT. Problem: Activity Intolerance:  Intervention: Assess signs of activity intolerance  Oxygen dependent. At this time. O2 at 2 liters per NC  Intervention: Manage energy conservation techniques  Rest periods worked with activity    Goal: Ability to tolerate increased activity will improve  Ability to tolerate increased activity will improve   Outcome: Ongoing  Pt. Still  Is SOB at times with exertion and takes her time with ambulation. Uses walker. Comments: Goals are being met. Pt. Will be going to  [facility] this afternoon at 1430.   Continue plan of care

## 2018-12-10 NOTE — PROGRESS NOTES
rOPINIRole  2 mg Oral TID       INVESTIGATIONS     Last 3 CMP:    Recent Labs      12/08/18   0539  12/09/18   0641  12/10/18   0532   NA  144  139  142   K  4.5  4.9  4.2   CL  101  98  99   CO2  30  28  30   BUN  32*  33*  35*   CREATININE  1.60*  1.86*  1.83*   CALCIUM  8.3*  8.8  8.4*       Last 3 CBC:  Recent Labs      12/08/18   0539  12/09/18   0641  12/10/18   0532   WBC  5.2  6.6  6.3   RBC  2.47*  2.65*  2.39*   HGB  7.9*  8.5*  7.8*   HCT  24.2*  25.9*  23.2*   MCV  98.1  97.7  97.3   MCH  32.1  32.1  32.5   MCHC  32.7  32.8  33.4   RDW  15.8*  15.7*  16.0*   PLT  155  181  151   MPV  7.5  7.8  7.8       ASSESSMENT     1. Acute kidney injury: it is essentially resolved and the creatinine today is 1.83 mg/dL which is at her CKD3 baseline,  She sees Dr. China Saravia for her outpatient neurological care. Now creatinine is at her baseline   2. Hypercalcemia secondary to volume depletion/Rocaltrol use - Improved  3. Hematuria and epistaxis. Improved off Eliquis  4. Chronic kidney disease stage III B-IV secondary to nephrosclerosis with baseline creatinine 1.8-2. Dr Zaina Novak. Creatinine is now back to baseline. 5.  Complicated  Urinary tract infection E. Coli - ESBL  6. Paraproteinemia  7. History of type 2 diabetes  8. History of hypertension  9. Proteinuria  10. Anemia  11. Edema    PLAN     1. Cont PO lasix 40 mg daily  2. Will need outpatient follow up with Dr. China Saravia 2-3 weeks after d/c.   3. Ok to discharge from nephrology standpoint. 4. Awaiting rehab placement.     Please do not hesitate to call with questions

## 2018-12-10 NOTE — PROGRESS NOTES
Cardiovascular progress Note          Patient name: Kal Lorenzana    YOB: 1945  Date of admission:  11/27/2018       Patient seen, examined. Previous clinical entries reviewed. All available laboratory, imaging and ancillary data reviewed. Subjective:      No new events overnight. She  denies any new chest pain, palpitations, orthopnea or PND. Systems review:  Constitutional: No fever/chills. HENT: No headache, neck pain or neck stiffness. No sore throat or dysphagia. Gastrointestinal: No abdominal pain, nausea or vomiting. Cardiac: As Above  Respiratory: As above  Neurologic: No new focal weakness or numbness  Psychiatric: Normal mood and mentation     Examination:   Vitals: BP (!) 127/46   Pulse 66   Temp 98.6 °F (37 °C) (Oral)   Resp 16   Ht 5' 1\" (1.549 m)   Wt 185 lb 3.2 oz (84 kg)   LMP 04/03/2004 (Within Years)   SpO2 100%   BMI 34.99 kg/m²     Intake/Output Summary (Last 24 hours) at 12/10/18 1428  Last data filed at 12/10/18 0935   Gross per 24 hour   Intake                0 ml   Output             2050 ml   Net            -2050 ml     General appearance: Comfortable in no apparent distress. HEENT: No pallor. No icterus  Neck: Supple. Lungs:Generally decreased breath sounds. Heart: S1,S2. No S3. Abdomen: Soft  Extremities: + peripheral edema  Skin: No obvious rashes. Musculoskeletal: No obvious deformities. Neurologic: No focal deficits.      Labs/ Ancillary data:     CBC:   Recent Labs      12/08/18   0539  12/09/18   0641  12/10/18   0532   WBC  5.2  6.6  6.3   HGB  7.9*  8.5*  7.8*   PLT  155  181  151     BMP:    Recent Labs      12/08/18   0539  12/09/18   0641  12/10/18   0532   NA  144  139  142   K  4.5  4.9  4.2   CL  101  98  99   CO2  30  28  30   BUN  32*  33*  35*   CREATININE  1.60*  1.86*  1.83*   GLUCOSE  104*  103*  161*     Medications:   Scheduled Meds:   furosemide  40 mg Oral Daily    magnesium oxide  400 mg Oral Daily    insulin lispro

## 2018-12-11 ENCOUNTER — TELEPHONE (OUTPATIENT)
Dept: ONCOLOGY | Age: 73
End: 2018-12-11

## 2018-12-11 ENCOUNTER — TELEPHONE (OUTPATIENT)
Dept: FAMILY MEDICINE CLINIC | Age: 73
End: 2018-12-11

## 2018-12-11 ENCOUNTER — HOSPITAL ENCOUNTER (OUTPATIENT)
Dept: INFUSION THERAPY | Facility: MEDICAL CENTER | Age: 73
End: 2018-12-11
Payer: COMMERCIAL

## 2018-12-12 ENCOUNTER — APPOINTMENT (OUTPATIENT)
Dept: GENERAL RADIOLOGY | Age: 73
End: 2018-12-12
Payer: COMMERCIAL

## 2018-12-12 ENCOUNTER — HOSPITAL ENCOUNTER (EMERGENCY)
Age: 73
Discharge: HOME | End: 2018-12-12
Attending: EMERGENCY MEDICINE
Payer: COMMERCIAL

## 2018-12-12 VITALS
BODY MASS INDEX: 33.42 KG/M2 | OXYGEN SATURATION: 95 % | HEART RATE: 71 BPM | TEMPERATURE: 97.6 F | WEIGHT: 177 LBS | RESPIRATION RATE: 15 BRPM | DIASTOLIC BLOOD PRESSURE: 64 MMHG | HEIGHT: 61 IN | SYSTOLIC BLOOD PRESSURE: 146 MMHG

## 2018-12-12 DIAGNOSIS — R07.89 ATYPICAL CHEST PAIN: Primary | ICD-10-CM

## 2018-12-12 DIAGNOSIS — J06.9 UPPER RESPIRATORY TRACT INFECTION, UNSPECIFIED TYPE: ICD-10-CM

## 2018-12-12 LAB
ABSOLUTE EOS #: 0.2 K/UL (ref 0–0.4)
ABSOLUTE IMMATURE GRANULOCYTE: ABNORMAL K/UL (ref 0–0.3)
ABSOLUTE LYMPH #: 1.5 K/UL (ref 1–4.8)
ABSOLUTE MONO #: 0.6 K/UL (ref 0.1–1.3)
ANION GAP SERPL CALCULATED.3IONS-SCNC: 10 MMOL/L (ref 9–17)
BASOPHILS # BLD: 1 % (ref 0–2)
BASOPHILS ABSOLUTE: 0.1 K/UL (ref 0–0.2)
BNP INTERPRETATION: ABNORMAL
BUN BLDV-MCNC: 38 MG/DL (ref 8–23)
BUN/CREAT BLD: ABNORMAL (ref 9–20)
CALCIUM SERPL-MCNC: 8.8 MG/DL (ref 8.6–10.4)
CHLORIDE BLD-SCNC: 100 MMOL/L (ref 98–107)
CO2: 32 MMOL/L (ref 20–31)
CREAT SERPL-MCNC: 1.87 MG/DL (ref 0.5–0.9)
DIFFERENTIAL TYPE: ABNORMAL
EKG ATRIAL RATE: 65 BPM
EKG P AXIS: 57 DEGREES
EKG P-R INTERVAL: 218 MS
EKG Q-T INTERVAL: 436 MS
EKG QRS DURATION: 88 MS
EKG QTC CALCULATION (BAZETT): 453 MS
EKG R AXIS: -15 DEGREES
EKG T AXIS: 53 DEGREES
EKG VENTRICULAR RATE: 65 BPM
EOSINOPHILS RELATIVE PERCENT: 3 % (ref 0–4)
GFR AFRICAN AMERICAN: 32 ML/MIN
GFR NON-AFRICAN AMERICAN: 27 ML/MIN
GFR SERPL CREATININE-BSD FRML MDRD: ABNORMAL ML/MIN/{1.73_M2}
GFR SERPL CREATININE-BSD FRML MDRD: ABNORMAL ML/MIN/{1.73_M2}
GLUCOSE BLD-MCNC: 98 MG/DL (ref 70–99)
HCT VFR BLD CALC: 27.9 % (ref 36–46)
HEMOGLOBIN: 9.2 G/DL (ref 12–16)
IMMATURE GRANULOCYTES: ABNORMAL %
INR BLD: 1
LYMPHOCYTES # BLD: 21 % (ref 24–44)
MCH RBC QN AUTO: 32.5 PG (ref 26–34)
MCHC RBC AUTO-ENTMCNC: 32.9 G/DL (ref 31–37)
MCV RBC AUTO: 98.8 FL (ref 80–100)
MONOCYTES # BLD: 8 % (ref 1–7)
NRBC AUTOMATED: ABNORMAL PER 100 WBC
PARTIAL THROMBOPLASTIN TIME: 26.2 SEC (ref 23–31)
PDW BLD-RTO: 15.5 % (ref 11.5–14.9)
PLATELET # BLD: 166 K/UL (ref 150–450)
PLATELET ESTIMATE: ABNORMAL
PMV BLD AUTO: 8.1 FL (ref 6–12)
POTASSIUM SERPL-SCNC: 4.5 MMOL/L (ref 3.7–5.3)
PRO-BNP: 320 PG/ML
PROTHROMBIN TIME: 10.6 SEC (ref 9.7–12)
RBC # BLD: 2.82 M/UL (ref 4–5.2)
RBC # BLD: ABNORMAL 10*6/UL
SEG NEUTROPHILS: 67 % (ref 36–66)
SEGMENTED NEUTROPHILS ABSOLUTE COUNT: 4.7 K/UL (ref 1.3–9.1)
SODIUM BLD-SCNC: 142 MMOL/L (ref 135–144)
TROPONIN INTERP: NORMAL
TROPONIN INTERP: NORMAL
TROPONIN T: <0.03 NG/ML
TROPONIN T: <0.03 NG/ML
WBC # BLD: 7.1 K/UL (ref 3.5–11)
WBC # BLD: ABNORMAL 10*3/UL

## 2018-12-12 PROCEDURE — 83880 ASSAY OF NATRIURETIC PEPTIDE: CPT

## 2018-12-12 PROCEDURE — 85610 PROTHROMBIN TIME: CPT

## 2018-12-12 PROCEDURE — 71046 X-RAY EXAM CHEST 2 VIEWS: CPT

## 2018-12-12 PROCEDURE — 96375 TX/PRO/DX INJ NEW DRUG ADDON: CPT

## 2018-12-12 PROCEDURE — 80048 BASIC METABOLIC PNL TOTAL CA: CPT

## 2018-12-12 PROCEDURE — 36415 COLL VENOUS BLD VENIPUNCTURE: CPT

## 2018-12-12 PROCEDURE — 85025 COMPLETE CBC W/AUTO DIFF WBC: CPT

## 2018-12-12 PROCEDURE — 84484 ASSAY OF TROPONIN QUANT: CPT

## 2018-12-12 PROCEDURE — 6360000002 HC RX W HCPCS: Performed by: EMERGENCY MEDICINE

## 2018-12-12 PROCEDURE — 85730 THROMBOPLASTIN TIME PARTIAL: CPT

## 2018-12-12 PROCEDURE — 96374 THER/PROPH/DIAG INJ IV PUSH: CPT

## 2018-12-12 PROCEDURE — 93005 ELECTROCARDIOGRAM TRACING: CPT

## 2018-12-12 PROCEDURE — 99285 EMERGENCY DEPT VISIT HI MDM: CPT

## 2018-12-12 RX ORDER — MORPHINE SULFATE 4 MG/ML
4 INJECTION, SOLUTION INTRAMUSCULAR; INTRAVENOUS ONCE
Status: COMPLETED | OUTPATIENT
Start: 2018-12-12 | End: 2018-12-12

## 2018-12-12 RX ORDER — ONDANSETRON 2 MG/ML
4 INJECTION INTRAMUSCULAR; INTRAVENOUS ONCE
Status: COMPLETED | OUTPATIENT
Start: 2018-12-12 | End: 2018-12-12

## 2018-12-12 RX ADMIN — ONDANSETRON 4 MG: 2 INJECTION INTRAMUSCULAR; INTRAVENOUS at 07:59

## 2018-12-12 RX ADMIN — MORPHINE SULFATE 4 MG: 4 INJECTION INTRAVENOUS at 07:59

## 2018-12-12 ASSESSMENT — ENCOUNTER SYMPTOMS
CONSTIPATION: 0
SORE THROAT: 1
TROUBLE SWALLOWING: 0
SHORTNESS OF BREATH: 1
COUGH: 0
DIARRHEA: 0
ABDOMINAL PAIN: 0
BACK PAIN: 0
VOMITING: 0
COLOR CHANGE: 0
NAUSEA: 0
BLOOD IN STOOL: 0

## 2018-12-12 ASSESSMENT — PAIN DESCRIPTION - LOCATION: LOCATION: HEAD;GENERALIZED

## 2018-12-12 ASSESSMENT — PAIN SCALES - GENERAL
PAINLEVEL_OUTOF10: 6
PAINLEVEL_OUTOF10: 7

## 2018-12-12 ASSESSMENT — PAIN DESCRIPTION - DESCRIPTORS: DESCRIPTORS: ACHING

## 2018-12-12 ASSESSMENT — PAIN DESCRIPTION - PAIN TYPE: TYPE: ACUTE PAIN

## 2018-12-12 NOTE — ED NOTES
ambulette here for pt transport. Discharge instructions reviewed with pt and pt's . Pt's  signed discharge instructions. Pt sent back to ECF with envelope of discharge papers and pt's care here in ED today.       Lila Lyman RN  12/12/18 2506

## 2018-12-21 ENCOUNTER — HOSPITAL ENCOUNTER (OUTPATIENT)
Age: 73
Setting detail: SPECIMEN
Discharge: HOME | End: 2018-12-21
Payer: MEDICARE

## 2018-12-21 LAB
BNP INTERPRETATION: ABNORMAL
HCT VFR BLD CALC: 29.6 % (ref 36.3–47.1)
HEMOGLOBIN: 8.9 G/DL (ref 11.9–15.1)
MCH RBC QN AUTO: 31.8 PG (ref 25.2–33.5)
MCHC RBC AUTO-ENTMCNC: 30.1 G/DL (ref 28.4–34.8)
MCV RBC AUTO: 105.7 FL (ref 82.6–102.9)
NRBC AUTOMATED: 0 PER 100 WBC
PDW BLD-RTO: 13.6 % (ref 11.8–14.4)
PLATELET # BLD: 121 K/UL (ref 138–453)
PMV BLD AUTO: 11.3 FL (ref 8.1–13.5)
PRO-BNP: 466 PG/ML
RBC # BLD: 2.8 M/UL (ref 3.95–5.11)
WBC # BLD: 7.3 K/UL (ref 3.5–11.3)

## 2018-12-21 PROCEDURE — 83880 ASSAY OF NATRIURETIC PEPTIDE: CPT

## 2018-12-21 PROCEDURE — 36415 COLL VENOUS BLD VENIPUNCTURE: CPT

## 2018-12-21 PROCEDURE — P9603 ONE-WAY ALLOW PRORATED MILES: HCPCS

## 2018-12-21 PROCEDURE — 85027 COMPLETE CBC AUTOMATED: CPT

## 2018-12-28 ENCOUNTER — OFFICE VISIT (OUTPATIENT)
Dept: FAMILY MEDICINE CLINIC | Age: 73
End: 2018-12-28
Payer: COMMERCIAL

## 2018-12-28 VITALS
SYSTOLIC BLOOD PRESSURE: 104 MMHG | HEART RATE: 61 BPM | RESPIRATION RATE: 16 BRPM | WEIGHT: 175 LBS | TEMPERATURE: 98.1 F | HEIGHT: 61 IN | BODY MASS INDEX: 33.04 KG/M2 | OXYGEN SATURATION: 98 % | DIASTOLIC BLOOD PRESSURE: 48 MMHG

## 2018-12-28 DIAGNOSIS — R60.0 EDEMA OF BOTH LEGS: ICD-10-CM

## 2018-12-28 DIAGNOSIS — F41.9 ANXIETY: ICD-10-CM

## 2018-12-28 DIAGNOSIS — R53.1 GENERAL WEAKNESS: Primary | ICD-10-CM

## 2018-12-28 PROCEDURE — 1111F DSCHRG MED/CURRENT MED MERGE: CPT | Performed by: FAMILY MEDICINE

## 2018-12-28 PROCEDURE — 1123F ACP DISCUSS/DSCN MKR DOCD: CPT | Performed by: FAMILY MEDICINE

## 2018-12-28 PROCEDURE — G8598 ASA/ANTIPLAT THER USED: HCPCS | Performed by: FAMILY MEDICINE

## 2018-12-28 PROCEDURE — 1101F PT FALLS ASSESS-DOCD LE1/YR: CPT | Performed by: FAMILY MEDICINE

## 2018-12-28 PROCEDURE — G8400 PT W/DXA NO RESULTS DOC: HCPCS | Performed by: FAMILY MEDICINE

## 2018-12-28 PROCEDURE — 3017F COLORECTAL CA SCREEN DOC REV: CPT | Performed by: FAMILY MEDICINE

## 2018-12-28 PROCEDURE — G8482 FLU IMMUNIZE ORDER/ADMIN: HCPCS | Performed by: FAMILY MEDICINE

## 2018-12-28 PROCEDURE — 4040F PNEUMOC VAC/ADMIN/RCVD: CPT | Performed by: FAMILY MEDICINE

## 2018-12-28 PROCEDURE — 1036F TOBACCO NON-USER: CPT | Performed by: FAMILY MEDICINE

## 2018-12-28 PROCEDURE — 1090F PRES/ABSN URINE INCON ASSESS: CPT | Performed by: FAMILY MEDICINE

## 2018-12-28 PROCEDURE — 99214 OFFICE O/P EST MOD 30 MIN: CPT | Performed by: FAMILY MEDICINE

## 2018-12-28 PROCEDURE — G8427 DOCREV CUR MEDS BY ELIG CLIN: HCPCS | Performed by: FAMILY MEDICINE

## 2018-12-28 PROCEDURE — G8417 CALC BMI ABV UP PARAM F/U: HCPCS | Performed by: FAMILY MEDICINE

## 2018-12-28 RX ORDER — SPIRONOLACTONE 25 MG/1
25 TABLET ORAL DAILY
Qty: 90 TABLET | Refills: 3 | Status: ON HOLD | OUTPATIENT
Start: 2018-12-28 | End: 2019-05-30 | Stop reason: HOSPADM

## 2018-12-28 RX ORDER — ALPRAZOLAM 0.5 MG/1
0.5 TABLET ORAL NIGHTLY PRN
Qty: 90 TABLET | Refills: 0 | Status: SHIPPED | OUTPATIENT
Start: 2018-12-28 | End: 2018-12-28 | Stop reason: SDUPTHER

## 2018-12-28 RX ORDER — ALPRAZOLAM 0.5 MG/1
0.5 TABLET ORAL NIGHTLY PRN
Qty: 90 TABLET | Refills: 0 | Status: SHIPPED | OUTPATIENT
Start: 2018-12-28 | End: 2019-01-27

## 2018-12-28 ASSESSMENT — PATIENT HEALTH QUESTIONNAIRE - PHQ9
SUM OF ALL RESPONSES TO PHQ9 QUESTIONS 1 & 2: 0
SUM OF ALL RESPONSES TO PHQ QUESTIONS 1-9: 0
2. FEELING DOWN, DEPRESSED OR HOPELESS: 0
1. LITTLE INTEREST OR PLEASURE IN DOING THINGS: 0
SUM OF ALL RESPONSES TO PHQ QUESTIONS 1-9: 0

## 2018-12-28 NOTE — PROGRESS NOTES
[address]  [facility] [address] [facility] [address]  Dept: [phone]      Karl Abdalla is a 72 y.o. female who presents today for follow up on her  medical conditions as noted below.       Chief Complaint   Patient presents with   Yarelis Phan     discharge from nursing home on 12/22/82018, have questions about some medication doses       Patient Active Problem List:     Controlled type 2 diabetes mellitus with stage 3 chronic kidney disease, without long-term current use of insulin (HCC)     Hyperlipidemia     Essential hypertension     Chronic leg pain     Chronic atrial fibrillation (HCC)     Asthma     Gastroesophageal reflux disease without esophagitis     ECTOR on CPAP     Type 2 diabetes mellitus without complication, without long-term current use of insulin ([address]     Spinal stenosis in cervical region     Hypomagnesemia     Hyperphosphaturia     Hypocalcemia     Parkinson's disease ([address]     Acute renal failure ([address]     Acute cystitis with hematuria     Altered mental status     Iron deficiency anemia due to chronic blood loss     Morbid obesity with BMI of 40.0-44.9, adult ([facility])     Hyperplastic polyp of intestine     Diverticulosis     Panlobular emphysema (HCC)     Rapid atrial fibrillation (HCC)     CKD (chronic kidney disease) stage 3, GFR 30-59 ml/min ([facility])     KRISTIN (acute kidney injury) ([address]     Anemia in chronic kidney disease     Chronic respiratory failure with hypoxia and hypercapnia ([facility])     CKD stage 3 due to type 2 diabetes mellitus (HCC)     E. coli UTI (urinary tract infection)     Nephrolithiasis     Candidal intertrigo     Venous insufficiency     Malabsorption     Anemia of chronic renal failure, stage 4 (severe) ([facility])     Iron deficiency     Coronary atherosclerosis     COPD exacerbation (HCC)     Restless leg syndrome     SIRS (systemic inflammatory response syndrome) ([facility])     Nausea and vomiting in adult     Bacterial UTI     Odynophagia and neck pain. Eyes: Negative for photophobia and visual disturbance. Respiratory: Negative. Negative for chest tightness and shortness of breath. Cardiovascular: Negative. Negative for chest pain and leg swelling. Gastrointestinal: Negative. Negative for abdominal pain and blood in stool. Endocrine: Negative for cold intolerance and polyuria. Genitourinary: Negative for dysuria and hematuria. Musculoskeletal: Negative. Skin: Negative for rash. Allergic/Immunologic: Negative. Neurological: Negative. Negative for dizziness, weakness and numbness. Hematological: Negative. Negative for adenopathy. Does not bruise/bleed easily. Psychiatric/Behavioral: Negative for sleep disturbance, dysphoric mood and  decreased concentration. The patient is not nervous/anxious. Objective:     Physical Exam:     Nursing note and vitals reviewed. BP (!) 104/48   Pulse 61   Temp 98.1 °F (36.7 °C) (Oral)   Resp 16   Ht 5' 0.98\" (1.549 m)   Wt 175 lb (79.4 kg)   LMP 04/03/2004 (Within Years)   SpO2 98%   BMI 33.08 kg/m²   Constitutional: She is oriented to person, place, and time. She   appears well-developed and well-nourished. HENT:   Head: Normocephalic and atraumatic. Right Ear: External ear normal. Tympanic membrane is not erythematous. No middle ear effusion. Left Ear: External ear normal. Tympanic membrane is not erythematous. No middle ear effusion. Nose: No mucosal edema. Mouth/Throat: Oropharynx is clear and moist. No posterior oropharyngeal erythema. Eyes: Conjunctivae and EOM are normal. Pupils are equal, round, and reactive to light. Neck: Normal range of motion. Neck supple. No thyromegaly present. Cardiovascular: Normal rate, regular rhythm and normal heart sounds. No murmur heard. Pulmonary/Chest: Effort normal and breath sounds normal. She has no wheezes. Shehas no rales. Abdominal: Soft.  Bowel sounds are normal. She exhibits no distension and no

## 2018-12-28 NOTE — PROGRESS NOTES
Visit Information    Have you changed or started any medications since your last visit including any over-the-counter medicines, vitamins, or herbal medicines? no   Are you having any side effects from any of your medications? -  no  Have you stopped taking any of your medications? Is so, why? , yes all documented    Have you seen any other physician or provider since your last visit? No  Have you had any other diagnostic tests since your last visit? No  Have you been seen in the emergency room and/or had an admission to a hospital since we last saw you? Yes - Records Requested  Have you had your routine dental cleaning in the past 6 months? no    Have you activated your [employer] account? If not, what are your barriers?  Yes     Patient Care Team:  Tonya Preston DO as PCP - General (Family Medicine)  Shirley Ring MD as Consulting Physician (Cardiology)    Medical History Review  Past Medical, Family, and Social History reviewed and does not contribute to the patient presenting condition    Health Maintenance   Topic Date Due    Diabetic foot exam  07/20/1957    DTaP/Tdap/Td vaccine (1 - Tdap) 07/20/1966    Breast cancer screen  07/20/1997    DEXA (modify frequency per FRAX score)  07/20/2012    Diabetic retinal exam  02/09/2017    Shingles Vaccine (1 of 2 - 2 Dose Series) 09/18/2017    Lipid screen  08/26/2018    TSH testing  11/28/2019    A1C test (Diabetic or Prediabetic)  12/02/2019    Potassium monitoring  12/12/2019    Creatinine monitoring  12/12/2019    Colon cancer screen colonoscopy  04/25/2020    Flu vaccine  Completed    Pneumococcal high/highest risk  Completed    Hepatitis C screen  Completed

## 2018-12-30 DIAGNOSIS — R60.0 EDEMA OF BOTH LEGS: ICD-10-CM

## 2018-12-31 RX ORDER — FUROSEMIDE 20 MG/1
TABLET ORAL
Qty: 180 TABLET | Refills: 3 | Status: SHIPPED | OUTPATIENT
Start: 2018-12-31 | End: 2019-02-05 | Stop reason: ALTCHOICE

## 2019-01-08 ENCOUNTER — HOSPITAL ENCOUNTER (OUTPATIENT)
Dept: INFUSION THERAPY | Facility: MEDICAL CENTER | Age: 74
Discharge: HOME | End: 2019-01-08
Payer: COMMERCIAL

## 2019-01-08 ENCOUNTER — HOSPITAL ENCOUNTER (OUTPATIENT)
Age: 74
Discharge: HOME | End: 2019-01-08
Payer: COMMERCIAL

## 2019-01-08 ENCOUNTER — HOSPITAL ENCOUNTER (OUTPATIENT)
Facility: MEDICAL CENTER | Age: 74
Discharge: HOME | End: 2019-01-08
Payer: COMMERCIAL

## 2019-01-08 ENCOUNTER — TELEPHONE (OUTPATIENT)
Dept: ONCOLOGY | Age: 74
End: 2019-01-08

## 2019-01-08 ENCOUNTER — OFFICE VISIT (OUTPATIENT)
Dept: ONCOLOGY | Age: 74
End: 2019-01-08
Payer: COMMERCIAL

## 2019-01-08 VITALS
TEMPERATURE: 98 F | RESPIRATION RATE: 24 BRPM | SYSTOLIC BLOOD PRESSURE: 143 MMHG | DIASTOLIC BLOOD PRESSURE: 44 MMHG | HEART RATE: 62 BPM

## 2019-01-08 DIAGNOSIS — N18.4 ANEMIA OF CHRONIC RENAL FAILURE, STAGE 4 (SEVERE) (HCC): Primary | ICD-10-CM

## 2019-01-08 DIAGNOSIS — D47.2 MONOCLONAL GAMMOPATHY OF UNDETERMINED SIGNIFICANCE: ICD-10-CM

## 2019-01-08 DIAGNOSIS — N20.0 NEPHROLITHIASIS: Chronic | ICD-10-CM

## 2019-01-08 DIAGNOSIS — D63.1 ANEMIA OF CHRONIC RENAL FAILURE, STAGE 4 (SEVERE) (HCC): ICD-10-CM

## 2019-01-08 DIAGNOSIS — E11.22 CKD STAGE 4 DUE TO TYPE 2 DIABETES MELLITUS (HCC): Chronic | ICD-10-CM

## 2019-01-08 DIAGNOSIS — E61.1 IRON DEFICIENCY: ICD-10-CM

## 2019-01-08 DIAGNOSIS — N18.4 ANEMIA OF CHRONIC RENAL FAILURE, STAGE 4 (SEVERE) (HCC): ICD-10-CM

## 2019-01-08 DIAGNOSIS — N18.4 CKD STAGE 4 DUE TO TYPE 2 DIABETES MELLITUS (HCC): Chronic | ICD-10-CM

## 2019-01-08 DIAGNOSIS — K90.89 OTHER SPECIFIED INTESTINAL MALABSORPTION: ICD-10-CM

## 2019-01-08 DIAGNOSIS — D63.1 ANEMIA OF CHRONIC RENAL FAILURE, STAGE 4 (SEVERE) (HCC): Primary | ICD-10-CM

## 2019-01-08 LAB
ABSOLUTE EOS #: 0.1 K/UL (ref 0–0.4)
ABSOLUTE IMMATURE GRANULOCYTE: ABNORMAL K/UL (ref 0–0.3)
ABSOLUTE LYMPH #: 1.5 K/UL (ref 1–4.8)
ABSOLUTE MONO #: 0.5 K/UL (ref 0.2–0.8)
BASOPHILS # BLD: 0 % (ref 0–2)
BASOPHILS ABSOLUTE: 0 K/UL (ref 0–0.2)
COMPLEMENT C3: 92 MG/DL (ref 90–180)
COMPLEMENT C4: 25 MG/DL (ref 10–40)
DIFFERENTIAL TYPE: ABNORMAL
EOSINOPHILS RELATIVE PERCENT: 2 % (ref 1–4)
FOLATE: 10 NG/ML
HCT VFR BLD CALC: 25.8 % (ref 36–46)
HCT VFR BLD CALC: 25.8 % (ref 36–46)
HEMOGLOBIN: 8.5 G/DL (ref 12–16)
HEMOGLOBIN: 8.5 G/DL (ref 12–16)
IMMATURE GRANULOCYTES: ABNORMAL %
LYMPHOCYTES # BLD: 19 % (ref 24–44)
MCH RBC QN AUTO: 31.7 PG (ref 26–34)
MCH RBC QN AUTO: 31.7 PG (ref 26–34)
MCHC RBC AUTO-ENTMCNC: 33.1 G/DL (ref 31–37)
MCHC RBC AUTO-ENTMCNC: 33.1 G/DL (ref 31–37)
MCV RBC AUTO: 95.9 FL (ref 80–100)
MCV RBC AUTO: 95.9 FL (ref 80–100)
MONOCYTES # BLD: 6 % (ref 1–7)
NRBC AUTOMATED: ABNORMAL PER 100 WBC
NRBC AUTOMATED: ABNORMAL PER 100 WBC
PDW BLD-RTO: 15.4 % (ref 11.5–14.5)
PDW BLD-RTO: 15.4 % (ref 11.5–14.5)
PLATELET # BLD: 187 K/UL (ref 130–400)
PLATELET # BLD: 187 K/UL (ref 130–400)
PLATELET ESTIMATE: ABNORMAL
PMV BLD AUTO: 7.3 FL (ref 6–12)
PMV BLD AUTO: 7.3 FL (ref 6–12)
RBC # BLD: 2.69 M/UL (ref 4–5.2)
RBC # BLD: 2.69 M/UL (ref 4–5.2)
RBC # BLD: ABNORMAL 10*6/UL
SEG NEUTROPHILS: 73 % (ref 36–66)
SEGMENTED NEUTROPHILS ABSOLUTE COUNT: 5.8 K/UL (ref 1.8–7.7)
VITAMIN B-12: >2000 PG/ML (ref 232–1245)
WBC # BLD: 7.9 K/UL (ref 3.5–11)
WBC # BLD: 7.9 K/UL (ref 3.5–11)
WBC # BLD: ABNORMAL 10*3/UL

## 2019-01-08 PROCEDURE — 82746 ASSAY OF FOLIC ACID SERUM: CPT

## 2019-01-08 PROCEDURE — 6360000002 HC RX W HCPCS: Performed by: INTERNAL MEDICINE

## 2019-01-08 PROCEDURE — 83540 ASSAY OF IRON: CPT

## 2019-01-08 PROCEDURE — 86038 ANTINUCLEAR ANTIBODIES: CPT

## 2019-01-08 PROCEDURE — 85025 COMPLETE CBC W/AUTO DIFF WBC: CPT

## 2019-01-08 PROCEDURE — 1111F DSCHRG MED/CURRENT MED MERGE: CPT | Performed by: INTERNAL MEDICINE

## 2019-01-08 PROCEDURE — 82728 ASSAY OF FERRITIN: CPT

## 2019-01-08 PROCEDURE — G8482 FLU IMMUNIZE ORDER/ADMIN: HCPCS | Performed by: INTERNAL MEDICINE

## 2019-01-08 PROCEDURE — 4040F PNEUMOC VAC/ADMIN/RCVD: CPT | Performed by: INTERNAL MEDICINE

## 2019-01-08 PROCEDURE — 96372 THER/PROPH/DIAG INJ SC/IM: CPT

## 2019-01-08 PROCEDURE — 1123F ACP DISCUSS/DSCN MKR DOCD: CPT | Performed by: INTERNAL MEDICINE

## 2019-01-08 PROCEDURE — 99211 OFF/OP EST MAY X REQ PHY/QHP: CPT

## 2019-01-08 PROCEDURE — 86334 IMMUNOFIX E-PHORESIS SERUM: CPT

## 2019-01-08 PROCEDURE — 1090F PRES/ABSN URINE INCON ASSESS: CPT | Performed by: INTERNAL MEDICINE

## 2019-01-08 PROCEDURE — 1036F TOBACCO NON-USER: CPT | Performed by: INTERNAL MEDICINE

## 2019-01-08 PROCEDURE — 1101F PT FALLS ASSESS-DOCD LE1/YR: CPT | Performed by: INTERNAL MEDICINE

## 2019-01-08 PROCEDURE — G8427 DOCREV CUR MEDS BY ELIG CLIN: HCPCS | Performed by: INTERNAL MEDICINE

## 2019-01-08 PROCEDURE — 82607 VITAMIN B-12: CPT

## 2019-01-08 PROCEDURE — G8598 ASA/ANTIPLAT THER USED: HCPCS | Performed by: INTERNAL MEDICINE

## 2019-01-08 PROCEDURE — G8417 CALC BMI ABV UP PARAM F/U: HCPCS | Performed by: INTERNAL MEDICINE

## 2019-01-08 PROCEDURE — 84165 PROTEIN E-PHORESIS SERUM: CPT

## 2019-01-08 PROCEDURE — 83550 IRON BINDING TEST: CPT

## 2019-01-08 PROCEDURE — 85027 COMPLETE CBC AUTOMATED: CPT

## 2019-01-08 PROCEDURE — 99214 OFFICE O/P EST MOD 30 MIN: CPT | Performed by: INTERNAL MEDICINE

## 2019-01-08 PROCEDURE — 3017F COLORECTAL CA SCREEN DOC REV: CPT | Performed by: INTERNAL MEDICINE

## 2019-01-08 PROCEDURE — 84155 ASSAY OF PROTEIN SERUM: CPT

## 2019-01-08 PROCEDURE — 86225 DNA ANTIBODY NATIVE: CPT

## 2019-01-08 PROCEDURE — 86160 COMPLEMENT ANTIGEN: CPT

## 2019-01-08 PROCEDURE — G8400 PT W/DXA NO RESULTS DOC: HCPCS | Performed by: INTERNAL MEDICINE

## 2019-01-08 PROCEDURE — 36415 COLL VENOUS BLD VENIPUNCTURE: CPT

## 2019-01-08 PROCEDURE — 83516 IMMUNOASSAY NONANTIBODY: CPT

## 2019-01-08 RX ORDER — CYANOCOBALAMIN 1000 UG/ML
1000 INJECTION INTRAMUSCULAR; SUBCUTANEOUS ONCE
Status: COMPLETED
Start: 2019-01-08 | End: 2019-01-08

## 2019-01-08 RX ORDER — ACETAMINOPHEN 325 MG/1
325 TABLET ORAL
Status: CANCELLED | OUTPATIENT
Start: 2019-01-08

## 2019-01-08 RX ORDER — CYANOCOBALAMIN 1000 UG/ML
1000 INJECTION INTRAMUSCULAR; SUBCUTANEOUS ONCE
Status: CANCELLED
Start: 2019-01-08

## 2019-01-08 RX ADMIN — DARBEPOETIN ALFA 200 MCG: 200 INJECTION, SOLUTION INTRAVENOUS; SUBCUTANEOUS at 15:48

## 2019-01-08 RX ADMIN — CYANOCOBALAMIN 1000 MCG: 1000 INJECTION, SOLUTION INTRAMUSCULAR at 15:46

## 2019-01-08 NOTE — PROGRESS NOTES
1527-Patient arrives per wheelchair with her friend, [facility], from MD visit for vitamin B12 injection and aranesp injection. Patient wearing oxygen at 2L/NC. Orders reviewed. Noted patient last received both injections on 11/20/18. Today's hemoglobin reviewed and is 8.5. Tolerated both injections well without complaints. Discharged per wheelchair to MD area with her friend, [facility], for her AVS and future appointments.

## 2019-01-09 LAB
ANTI DNA DOUBLE STRANDED: 62 IU/ML
ANTI-NUCLEAR ANTIBODY (ANA): POSITIVE
FERRITIN: 255 UG/L (ref 13–150)
IRON SATURATION: 20 % (ref 20–55)
IRON: 55 UG/DL (ref 37–145)
TOTAL IRON BINDING CAPACITY: 273 UG/DL (ref 250–450)
UNSATURATED IRON BINDING CAPACITY: 218 UG/DL (ref 112–347)

## 2019-01-10 ENCOUNTER — PATIENT MESSAGE (OUTPATIENT)
Dept: FAMILY MEDICINE CLINIC | Age: 74
End: 2019-01-10

## 2019-01-10 LAB
ALBUMIN (CALCULATED): 4.8 G/DL (ref 3.2–5.2)
ALBUMIN PERCENT: 69 % (ref 45–65)
ALPHA 1 PERCENT: 3 % (ref 3–6)
ALPHA 2 PERCENT: 11 % (ref 6–13)
ALPHA-1-GLOBULIN: 0.2 G/DL (ref 0.1–0.4)
ALPHA-2-GLOBULIN: 0.8 G/DL (ref 0.5–0.9)
BETA GLOBULIN: 0.6 G/DL (ref 0.5–1.1)
BETA PERCENT: 8 % (ref 11–19)
GAMMA GLOBULIN %: 9 % (ref 9–20)
GAMMA GLOBULIN: 0.6 G/DL (ref 0.5–1.5)
PATHOLOGIST: ABNORMAL
PATHOLOGIST: NORMAL
PROTEIN ELECTROPHORESIS, SERUM: ABNORMAL
SERUM IFX INTERP: NORMAL
TOTAL PROT. SUM,%: 100 % (ref 98–102)
TOTAL PROT. SUM: 7 G/DL (ref 6.3–8.2)
TOTAL PROTEIN: 7 G/DL (ref 6.4–8.3)

## 2019-01-11 LAB
ANCA MYELOPEROXIDASE: 7 AU/ML
ANCA PROTEINASE 3: 5 AU/ML
GBM ANTIBODY IGG: 5 AU/ML

## 2019-01-15 DIAGNOSIS — J44.1 COPD WITH EXACERBATION (HCC): ICD-10-CM

## 2019-01-15 RX ORDER — FLUTICASONE FUROATE AND VILANTEROL TRIFENATATE 100; 25 UG/1; UG/1
POWDER RESPIRATORY (INHALATION)
Qty: 180 EACH | Refills: 5 | Status: SHIPPED | OUTPATIENT
Start: 2019-01-15 | End: 2019-07-03 | Stop reason: ALTCHOICE

## 2019-01-16 DIAGNOSIS — E11.22 CONTROLLED TYPE 2 DIABETES MELLITUS WITH STAGE 3 CHRONIC KIDNEY DISEASE, WITHOUT LONG-TERM CURRENT USE OF INSULIN (HCC): ICD-10-CM

## 2019-01-16 DIAGNOSIS — N18.30 CONTROLLED TYPE 2 DIABETES MELLITUS WITH STAGE 3 CHRONIC KIDNEY DISEASE, WITHOUT LONG-TERM CURRENT USE OF INSULIN (HCC): ICD-10-CM

## 2019-01-21 ENCOUNTER — HOSPITAL ENCOUNTER (OUTPATIENT)
Facility: MEDICAL CENTER | Age: 74
End: 2019-01-21
Payer: COMMERCIAL

## 2019-01-28 ENCOUNTER — TELEPHONE (OUTPATIENT)
Dept: ONCOLOGY | Age: 74
End: 2019-01-28

## 2019-02-05 ENCOUNTER — HOSPITAL ENCOUNTER (OUTPATIENT)
Dept: INFUSION THERAPY | Facility: MEDICAL CENTER | Age: 74
Discharge: HOME | End: 2019-02-05
Payer: COMMERCIAL

## 2019-02-05 VITALS
SYSTOLIC BLOOD PRESSURE: 155 MMHG | HEART RATE: 61 BPM | DIASTOLIC BLOOD PRESSURE: 67 MMHG | TEMPERATURE: 97.2 F | RESPIRATION RATE: 16 BRPM

## 2019-02-05 DIAGNOSIS — E61.1 IRON DEFICIENCY: ICD-10-CM

## 2019-02-05 DIAGNOSIS — E11.22 CKD STAGE 4 DUE TO TYPE 2 DIABETES MELLITUS (HCC): Chronic | ICD-10-CM

## 2019-02-05 DIAGNOSIS — D63.1 ANEMIA OF CHRONIC RENAL FAILURE, STAGE 4 (SEVERE) (HCC): ICD-10-CM

## 2019-02-05 DIAGNOSIS — D64.9 ANEMIA, UNSPECIFIED TYPE: ICD-10-CM

## 2019-02-05 DIAGNOSIS — K21.9 GASTROESOPHAGEAL REFLUX DISEASE WITHOUT ESOPHAGITIS: ICD-10-CM

## 2019-02-05 DIAGNOSIS — N18.4 CKD STAGE 4 DUE TO TYPE 2 DIABETES MELLITUS (HCC): Chronic | ICD-10-CM

## 2019-02-05 DIAGNOSIS — N20.0 NEPHROLITHIASIS: Chronic | ICD-10-CM

## 2019-02-05 DIAGNOSIS — N18.4 ANEMIA OF CHRONIC RENAL FAILURE, STAGE 4 (SEVERE) (HCC): ICD-10-CM

## 2019-02-05 DIAGNOSIS — K90.89 OTHER SPECIFIED INTESTINAL MALABSORPTION: ICD-10-CM

## 2019-02-05 LAB
ABSOLUTE EOS #: 0.2 K/UL (ref 0–0.4)
ABSOLUTE IMMATURE GRANULOCYTE: ABNORMAL K/UL (ref 0–0.3)
ABSOLUTE LYMPH #: 1.1 K/UL (ref 1–4.8)
ABSOLUTE MONO #: 0.4 K/UL (ref 0.2–0.8)
ANION GAP SERPL CALCULATED.3IONS-SCNC: 11 MMOL/L (ref 9–17)
BASOPHILS # BLD: 0 % (ref 0–2)
BASOPHILS ABSOLUTE: 0 K/UL (ref 0–0.2)
BUN BLDV-MCNC: 31 MG/DL (ref 8–23)
BUN/CREAT BLD: 12 (ref 9–20)
CALCIUM SERPL-MCNC: 11 MG/DL (ref 8.6–10.4)
CHLORIDE BLD-SCNC: 99 MMOL/L (ref 98–107)
CO2: 34 MMOL/L (ref 20–31)
CREAT SERPL-MCNC: 2.55 MG/DL (ref 0.5–0.9)
DIFFERENTIAL TYPE: ABNORMAL
EOSINOPHILS RELATIVE PERCENT: 3 % (ref 1–4)
GFR AFRICAN AMERICAN: 22 ML/MIN
GFR NON-AFRICAN AMERICAN: 19 ML/MIN
GFR SERPL CREATININE-BSD FRML MDRD: ABNORMAL ML/MIN/{1.73_M2}
GFR SERPL CREATININE-BSD FRML MDRD: ABNORMAL ML/MIN/{1.73_M2}
GLUCOSE BLD-MCNC: 100 MG/DL (ref 70–99)
HCT VFR BLD CALC: 29.1 % (ref 36–46)
HEMOGLOBIN: 9.6 G/DL (ref 12–16)
IMMATURE GRANULOCYTES: ABNORMAL %
LYMPHOCYTES # BLD: 15 % (ref 24–44)
MCH RBC QN AUTO: 31.7 PG (ref 26–34)
MCHC RBC AUTO-ENTMCNC: 32.8 G/DL (ref 31–37)
MCV RBC AUTO: 96.4 FL (ref 80–100)
MONOCYTES # BLD: 5 % (ref 1–7)
NRBC AUTOMATED: ABNORMAL PER 100 WBC
PDW BLD-RTO: 14.8 % (ref 11.5–14.5)
PLATELET # BLD: 131 K/UL (ref 130–400)
PLATELET ESTIMATE: ABNORMAL
PMV BLD AUTO: 7.9 FL (ref 6–12)
POTASSIUM SERPL-SCNC: 4 MMOL/L (ref 3.7–5.3)
RBC # BLD: 3.02 M/UL (ref 4–5.2)
RBC # BLD: ABNORMAL 10*6/UL
SEG NEUTROPHILS: 77 % (ref 36–66)
SEGMENTED NEUTROPHILS ABSOLUTE COUNT: 5.5 K/UL (ref 1.8–7.7)
SODIUM BLD-SCNC: 144 MMOL/L (ref 135–144)
WBC # BLD: 7.2 K/UL (ref 3.5–11)
WBC # BLD: ABNORMAL 10*3/UL

## 2019-02-05 PROCEDURE — 96372 THER/PROPH/DIAG INJ SC/IM: CPT

## 2019-02-05 PROCEDURE — 80048 BASIC METABOLIC PNL TOTAL CA: CPT

## 2019-02-05 PROCEDURE — 85025 COMPLETE CBC W/AUTO DIFF WBC: CPT

## 2019-02-05 PROCEDURE — 6360000002 HC RX W HCPCS: Performed by: INTERNAL MEDICINE

## 2019-02-05 RX ORDER — ACETAMINOPHEN 325 MG/1
325 TABLET ORAL
Status: CANCELLED | OUTPATIENT
Start: 2019-02-05

## 2019-02-05 RX ORDER — CYANOCOBALAMIN 1000 UG/ML
1000 INJECTION INTRAMUSCULAR; SUBCUTANEOUS ONCE
Status: COMPLETED
Start: 2019-02-05 | End: 2019-02-05

## 2019-02-05 RX ORDER — CYANOCOBALAMIN 1000 UG/ML
1000 INJECTION INTRAMUSCULAR; SUBCUTANEOUS ONCE
Status: CANCELLED
Start: 2019-02-05

## 2019-02-05 RX ADMIN — CYANOCOBALAMIN 1000 MCG: 1000 INJECTION, SOLUTION INTRAMUSCULAR at 09:38

## 2019-02-05 RX ADMIN — DARBEPOETIN ALFA 200 MCG: 200 INJECTION, SOLUTION INTRAVENOUS; SUBCUTANEOUS at 09:38

## 2019-02-05 NOTE — PROGRESS NOTES
Liliana Bernabe arrives per wheelchair with daughter  Order for aranesp and vitamin B12 injections  Liliana Bernabe did not have lab drawn   here and labs drawn  Hg 9.6  See MAR for Aranesp and vitamin B12 injections  Tolerated well  Discharged per wheelchair with daughter to home

## 2019-02-07 RX ORDER — PANTOPRAZOLE SODIUM 40 MG/1
TABLET, DELAYED RELEASE ORAL
Qty: 180 TABLET | Refills: 3 | Status: SHIPPED | OUTPATIENT
Start: 2019-02-07 | End: 2021-01-01

## 2019-02-19 ENCOUNTER — TELEPHONE (OUTPATIENT)
Dept: ONCOLOGY | Age: 74
End: 2019-02-19

## 2019-02-19 ENCOUNTER — OFFICE VISIT (OUTPATIENT)
Dept: ONCOLOGY | Age: 74
End: 2019-02-19
Payer: COMMERCIAL

## 2019-02-19 ENCOUNTER — HOSPITAL ENCOUNTER (OUTPATIENT)
Dept: INFUSION THERAPY | Facility: MEDICAL CENTER | Age: 74
End: 2019-02-19
Payer: COMMERCIAL

## 2019-02-19 ENCOUNTER — HOSPITAL ENCOUNTER (OUTPATIENT)
Dept: INFUSION THERAPY | Facility: MEDICAL CENTER | Age: 74
Discharge: HOME | End: 2019-02-19
Payer: COMMERCIAL

## 2019-02-19 ENCOUNTER — HOSPITAL ENCOUNTER (OUTPATIENT)
Facility: MEDICAL CENTER | Age: 74
Discharge: HOME | End: 2019-02-19
Payer: COMMERCIAL

## 2019-02-19 VITALS
WEIGHT: 177 LBS | RESPIRATION RATE: 18 BRPM | DIASTOLIC BLOOD PRESSURE: 50 MMHG | BODY MASS INDEX: 33.46 KG/M2 | HEART RATE: 55 BPM | TEMPERATURE: 98.3 F | SYSTOLIC BLOOD PRESSURE: 132 MMHG

## 2019-02-19 VITALS
SYSTOLIC BLOOD PRESSURE: 132 MMHG | DIASTOLIC BLOOD PRESSURE: 50 MMHG | TEMPERATURE: 98.3 F | HEART RATE: 55 BPM | RESPIRATION RATE: 18 BRPM

## 2019-02-19 DIAGNOSIS — D47.2 MONOCLONAL GAMMOPATHY OF UNDETERMINED SIGNIFICANCE: ICD-10-CM

## 2019-02-19 DIAGNOSIS — D63.1 ANEMIA OF CHRONIC RENAL FAILURE, STAGE 4 (SEVERE) (HCC): ICD-10-CM

## 2019-02-19 DIAGNOSIS — N18.4 STAGE 4 CHRONIC KIDNEY DISEASE (HCC): ICD-10-CM

## 2019-02-19 DIAGNOSIS — K90.89 OTHER SPECIFIED INTESTINAL MALABSORPTION: ICD-10-CM

## 2019-02-19 DIAGNOSIS — N18.4 ANEMIA OF CHRONIC RENAL FAILURE, STAGE 4 (SEVERE) (HCC): ICD-10-CM

## 2019-02-19 DIAGNOSIS — E61.1 IRON DEFICIENCY: ICD-10-CM

## 2019-02-19 DIAGNOSIS — D64.9 ANEMIA, UNSPECIFIED TYPE: ICD-10-CM

## 2019-02-19 DIAGNOSIS — D53.9 MACROCYTIC ANEMIA: ICD-10-CM

## 2019-02-19 DIAGNOSIS — N18.4 ANEMIA OF CHRONIC RENAL FAILURE, STAGE 4 (SEVERE) (HCC): Primary | ICD-10-CM

## 2019-02-19 DIAGNOSIS — D63.1 ANEMIA OF CHRONIC RENAL FAILURE, STAGE 4 (SEVERE) (HCC): Primary | ICD-10-CM

## 2019-02-19 LAB
ABSOLUTE EOS #: 0.2 K/UL (ref 0–0.4)
ABSOLUTE IMMATURE GRANULOCYTE: ABNORMAL K/UL (ref 0–0.3)
ABSOLUTE LYMPH #: 1.2 K/UL (ref 1–4.8)
ABSOLUTE MONO #: 0.6 K/UL (ref 0.2–0.8)
BASOPHILS # BLD: 0 % (ref 0–2)
BASOPHILS ABSOLUTE: 0 K/UL (ref 0–0.2)
DIFFERENTIAL TYPE: ABNORMAL
EOSINOPHILS RELATIVE PERCENT: 3 % (ref 1–4)
HCT VFR BLD CALC: 32.6 % (ref 36–46)
HEMOGLOBIN: 10.9 G/DL (ref 12–16)
IMMATURE GRANULOCYTES: ABNORMAL %
LYMPHOCYTES # BLD: 16 % (ref 24–44)
MCH RBC QN AUTO: 32.9 PG (ref 26–34)
MCHC RBC AUTO-ENTMCNC: 33.5 G/DL (ref 31–37)
MCV RBC AUTO: 98.3 FL (ref 80–100)
MONOCYTES # BLD: 8 % (ref 1–7)
NRBC AUTOMATED: ABNORMAL PER 100 WBC
PDW BLD-RTO: 14.9 % (ref 11.5–14.5)
PLATELET # BLD: 126 K/UL (ref 130–400)
PLATELET ESTIMATE: ABNORMAL
PMV BLD AUTO: ABNORMAL FL (ref 6–12)
RBC # BLD: 3.31 M/UL (ref 4–5.2)
RBC # BLD: ABNORMAL 10*6/UL
SEG NEUTROPHILS: 73 % (ref 36–66)
SEGMENTED NEUTROPHILS ABSOLUTE COUNT: 5.7 K/UL (ref 1.8–7.7)
WBC # BLD: 7.7 K/UL (ref 3.5–11)
WBC # BLD: ABNORMAL 10*3/UL

## 2019-02-19 PROCEDURE — 36415 COLL VENOUS BLD VENIPUNCTURE: CPT

## 2019-02-19 PROCEDURE — 99214 OFFICE O/P EST MOD 30 MIN: CPT | Performed by: INTERNAL MEDICINE

## 2019-02-19 PROCEDURE — 3017F COLORECTAL CA SCREEN DOC REV: CPT | Performed by: INTERNAL MEDICINE

## 2019-02-19 PROCEDURE — G8482 FLU IMMUNIZE ORDER/ADMIN: HCPCS | Performed by: INTERNAL MEDICINE

## 2019-02-19 PROCEDURE — 4040F PNEUMOC VAC/ADMIN/RCVD: CPT | Performed by: INTERNAL MEDICINE

## 2019-02-19 PROCEDURE — G8400 PT W/DXA NO RESULTS DOC: HCPCS | Performed by: INTERNAL MEDICINE

## 2019-02-19 PROCEDURE — 85025 COMPLETE CBC W/AUTO DIFF WBC: CPT

## 2019-02-19 PROCEDURE — 96372 THER/PROPH/DIAG INJ SC/IM: CPT

## 2019-02-19 PROCEDURE — G8427 DOCREV CUR MEDS BY ELIG CLIN: HCPCS | Performed by: INTERNAL MEDICINE

## 2019-02-19 PROCEDURE — G8417 CALC BMI ABV UP PARAM F/U: HCPCS | Performed by: INTERNAL MEDICINE

## 2019-02-19 PROCEDURE — G8598 ASA/ANTIPLAT THER USED: HCPCS | Performed by: INTERNAL MEDICINE

## 2019-02-19 PROCEDURE — 1090F PRES/ABSN URINE INCON ASSESS: CPT | Performed by: INTERNAL MEDICINE

## 2019-02-19 PROCEDURE — 1036F TOBACCO NON-USER: CPT | Performed by: INTERNAL MEDICINE

## 2019-02-19 PROCEDURE — 1123F ACP DISCUSS/DSCN MKR DOCD: CPT | Performed by: INTERNAL MEDICINE

## 2019-02-19 PROCEDURE — 6360000002 HC RX W HCPCS: Performed by: INTERNAL MEDICINE

## 2019-02-19 PROCEDURE — 99211 OFF/OP EST MAY X REQ PHY/QHP: CPT

## 2019-02-19 PROCEDURE — 1101F PT FALLS ASSESS-DOCD LE1/YR: CPT | Performed by: INTERNAL MEDICINE

## 2019-02-19 RX ORDER — CYANOCOBALAMIN 1000 UG/ML
1000 INJECTION INTRAMUSCULAR; SUBCUTANEOUS ONCE
Status: CANCELLED
Start: 2019-03-05

## 2019-02-19 RX ORDER — ACETAMINOPHEN 325 MG/1
325 TABLET ORAL
Status: CANCELLED | OUTPATIENT
Start: 2019-03-05

## 2019-02-19 RX ORDER — CYANOCOBALAMIN 1000 UG/ML
1000 INJECTION INTRAMUSCULAR; SUBCUTANEOUS ONCE
Status: COMPLETED
Start: 2019-02-19 | End: 2019-02-19

## 2019-02-19 RX ADMIN — CYANOCOBALAMIN 1000 MCG: 1000 INJECTION, SOLUTION INTRAMUSCULAR at 09:52

## 2019-02-19 NOTE — PROGRESS NOTES
Pt arrives per wheelchair with visitor and after md visit. Labs and orders reviewed. No aranesp. HGB 10.9. Spoke with Francis , pharmacist and pt received Vitamin B 12 injection on 2/5/19. OK per Dr Jesu Grimm to proceed with vitamin B 12 today. Francis notified ok per md for today. Pt tolerated vitamin B 12 injection well. Slight bleeding at site and pressure held for 1 min. Bleeding stopped and slight bruise at site noted. Bandaid applied. Notified pt and visitor to check on site through the day and they states understanding. Pt very bruised to eyes. Pt states had eyelid surgery recently. Pt given AVS with next appts from . Pt discharged per wheelchair with visitor and no other reactions or complaints voiced.

## 2019-02-25 RX ORDER — BLOOD SUGAR DIAGNOSTIC
1 STRIP MISCELLANEOUS DAILY
Qty: 100 EACH | Refills: 3 | Status: SHIPPED | OUTPATIENT
Start: 2019-02-25 | End: 2019-03-04 | Stop reason: SDUPTHER

## 2019-03-03 ENCOUNTER — APPOINTMENT (OUTPATIENT)
Dept: GENERAL RADIOLOGY | Age: 74
End: 2019-03-03
Payer: COMMERCIAL

## 2019-03-03 ENCOUNTER — HOSPITAL ENCOUNTER (EMERGENCY)
Age: 74
Discharge: HOME | End: 2019-03-03
Attending: EMERGENCY MEDICINE
Payer: COMMERCIAL

## 2019-03-03 VITALS
HEART RATE: 85 BPM | OXYGEN SATURATION: 96 % | WEIGHT: 178 LBS | SYSTOLIC BLOOD PRESSURE: 188 MMHG | TEMPERATURE: 98.1 F | BODY MASS INDEX: 31.54 KG/M2 | DIASTOLIC BLOOD PRESSURE: 55 MMHG | RESPIRATION RATE: 20 BRPM | HEIGHT: 63 IN

## 2019-03-03 DIAGNOSIS — J44.1 COPD EXACERBATION (HCC): Primary | ICD-10-CM

## 2019-03-03 LAB
ABSOLUTE EOS #: 0.1 K/UL (ref 0–0.4)
ABSOLUTE IMMATURE GRANULOCYTE: ABNORMAL K/UL (ref 0–0.3)
ABSOLUTE LYMPH #: 0.9 K/UL (ref 1–4.8)
ABSOLUTE MONO #: 0.4 K/UL (ref 0.2–0.8)
ANION GAP SERPL CALCULATED.3IONS-SCNC: 12 MMOL/L (ref 9–17)
BASOPHILS # BLD: 1 % (ref 0–2)
BASOPHILS ABSOLUTE: 0.1 K/UL (ref 0–0.2)
BNP INTERPRETATION: ABNORMAL
BUN BLDV-MCNC: 33 MG/DL (ref 8–23)
BUN/CREAT BLD: 15 (ref 9–20)
CALCIUM SERPL-MCNC: 11.4 MG/DL (ref 8.6–10.4)
CHLORIDE BLD-SCNC: 98 MMOL/L (ref 98–107)
CO2: 32 MMOL/L (ref 20–31)
CREAT SERPL-MCNC: 2.13 MG/DL (ref 0.5–0.9)
D-DIMER QUANTITATIVE: 0.89 MG/L FEU
DIFFERENTIAL TYPE: ABNORMAL
DIRECT EXAM: NORMAL
EOSINOPHILS RELATIVE PERCENT: 2 % (ref 1–4)
GFR AFRICAN AMERICAN: 28 ML/MIN
GFR NON-AFRICAN AMERICAN: 23 ML/MIN
GFR SERPL CREATININE-BSD FRML MDRD: ABNORMAL ML/MIN/{1.73_M2}
GFR SERPL CREATININE-BSD FRML MDRD: ABNORMAL ML/MIN/{1.73_M2}
GLUCOSE BLD-MCNC: 157 MG/DL (ref 70–99)
HCT VFR BLD CALC: 33.4 % (ref 36–46)
HEMOGLOBIN: 11.1 G/DL (ref 12–16)
IMMATURE GRANULOCYTES: ABNORMAL %
INR BLD: 0.9
LYMPHOCYTES # BLD: 12 % (ref 24–44)
Lab: NORMAL
MAGNESIUM: 1.9 MG/DL (ref 1.6–2.6)
MCH RBC QN AUTO: 32.4 PG (ref 26–34)
MCHC RBC AUTO-ENTMCNC: 33.3 G/DL (ref 31–37)
MCV RBC AUTO: 97.4 FL (ref 80–100)
MONOCYTES # BLD: 6 % (ref 1–7)
NRBC AUTOMATED: ABNORMAL PER 100 WBC
PARTIAL THROMBOPLASTIN TIME: 23.8 SEC (ref 23–31)
PDW BLD-RTO: 14.8 % (ref 11.5–14.5)
PLATELET # BLD: 137 K/UL (ref 130–400)
PLATELET ESTIMATE: ABNORMAL
PMV BLD AUTO: 7.9 FL (ref 6–12)
POTASSIUM SERPL-SCNC: 3.6 MMOL/L (ref 3.7–5.3)
PRO-BNP: 410 PG/ML
PROTHROMBIN TIME: 9.8 SEC (ref 9.7–11.6)
RBC # BLD: 3.42 M/UL (ref 4–5.2)
RBC # BLD: ABNORMAL 10*6/UL
SEG NEUTROPHILS: 79 % (ref 36–66)
SEGMENTED NEUTROPHILS ABSOLUTE COUNT: 5.5 K/UL (ref 1.8–7.7)
SODIUM BLD-SCNC: 142 MMOL/L (ref 135–144)
SPECIMEN DESCRIPTION: NORMAL
TROPONIN INTERP: ABNORMAL
TROPONIN INTERP: ABNORMAL
TROPONIN T: ABNORMAL NG/ML
TROPONIN T: ABNORMAL NG/ML
TROPONIN, HIGH SENSITIVITY: 29 NG/L (ref 0–14)
TROPONIN, HIGH SENSITIVITY: 34 NG/L (ref 0–14)
WBC # BLD: 7.1 K/UL (ref 3.5–11)
WBC # BLD: ABNORMAL 10*3/UL

## 2019-03-03 PROCEDURE — 93005 ELECTROCARDIOGRAM TRACING: CPT

## 2019-03-03 PROCEDURE — 87804 INFLUENZA ASSAY W/OPTIC: CPT

## 2019-03-03 PROCEDURE — 6360000002 HC RX W HCPCS: Performed by: EMERGENCY MEDICINE

## 2019-03-03 PROCEDURE — 94664 DEMO&/EVAL PT USE INHALER: CPT

## 2019-03-03 PROCEDURE — 83735 ASSAY OF MAGNESIUM: CPT

## 2019-03-03 PROCEDURE — 85379 FIBRIN DEGRADATION QUANT: CPT

## 2019-03-03 PROCEDURE — 80048 BASIC METABOLIC PNL TOTAL CA: CPT

## 2019-03-03 PROCEDURE — 85025 COMPLETE CBC W/AUTO DIFF WBC: CPT

## 2019-03-03 PROCEDURE — 96374 THER/PROPH/DIAG INJ IV PUSH: CPT

## 2019-03-03 PROCEDURE — 84484 ASSAY OF TROPONIN QUANT: CPT

## 2019-03-03 PROCEDURE — 85730 THROMBOPLASTIN TIME PARTIAL: CPT

## 2019-03-03 PROCEDURE — 85610 PROTHROMBIN TIME: CPT

## 2019-03-03 PROCEDURE — 83880 ASSAY OF NATRIURETIC PEPTIDE: CPT

## 2019-03-03 PROCEDURE — 99285 EMERGENCY DEPT VISIT HI MDM: CPT

## 2019-03-03 PROCEDURE — 71045 X-RAY EXAM CHEST 1 VIEW: CPT

## 2019-03-03 PROCEDURE — 94640 AIRWAY INHALATION TREATMENT: CPT

## 2019-03-03 RX ORDER — IPRATROPIUM BROMIDE AND ALBUTEROL SULFATE 2.5; .5 MG/3ML; MG/3ML
1 SOLUTION RESPIRATORY (INHALATION)
Status: DISCONTINUED | OUTPATIENT
Start: 2019-03-03 | End: 2019-03-03 | Stop reason: HOSPADM

## 2019-03-03 RX ORDER — METHYLPREDNISOLONE SODIUM SUCCINATE 125 MG/2ML
125 INJECTION, POWDER, LYOPHILIZED, FOR SOLUTION INTRAMUSCULAR; INTRAVENOUS ONCE
Status: COMPLETED | OUTPATIENT
Start: 2019-03-03 | End: 2019-03-03

## 2019-03-03 RX ORDER — ALBUTEROL SULFATE 90 UG/1
2 AEROSOL, METERED RESPIRATORY (INHALATION)
Status: DISCONTINUED | OUTPATIENT
Start: 2019-03-03 | End: 2019-03-03 | Stop reason: HOSPADM

## 2019-03-03 RX ORDER — ALBUTEROL SULFATE 2.5 MG/3ML
5 SOLUTION RESPIRATORY (INHALATION)
Status: DISCONTINUED | OUTPATIENT
Start: 2019-03-03 | End: 2019-03-03 | Stop reason: HOSPADM

## 2019-03-03 RX ADMIN — METHYLPREDNISOLONE SODIUM SUCCINATE 125 MG: 125 INJECTION, POWDER, FOR SOLUTION INTRAMUSCULAR; INTRAVENOUS at 10:50

## 2019-03-03 RX ADMIN — ALBUTEROL SULFATE 5 MG: 5 SOLUTION RESPIRATORY (INHALATION) at 10:44

## 2019-03-03 ASSESSMENT — PAIN DESCRIPTION - PAIN TYPE: TYPE: ACUTE PAIN

## 2019-03-03 ASSESSMENT — PAIN SCALES - GENERAL: PAINLEVEL_OUTOF10: 10

## 2019-03-04 LAB
EKG ATRIAL RATE: 24 BPM
EKG ATRIAL RATE: 66 BPM
EKG P AXIS: 32 DEGREES
EKG P-R INTERVAL: 198 MS
EKG Q-T INTERVAL: 428 MS
EKG Q-T INTERVAL: 430 MS
EKG QRS DURATION: 94 MS
EKG QRS DURATION: 94 MS
EKG QTC CALCULATION (BAZETT): 450 MS
EKG QTC CALCULATION (BAZETT): 468 MS
EKG R AXIS: -13 DEGREES
EKG R AXIS: -26 DEGREES
EKG T AXIS: 23 DEGREES
EKG T AXIS: 34 DEGREES
EKG VENTRICULAR RATE: 66 BPM
EKG VENTRICULAR RATE: 72 BPM

## 2019-03-04 RX ORDER — BLOOD SUGAR DIAGNOSTIC
1 STRIP MISCELLANEOUS DAILY
Qty: 100 EACH | Refills: 3 | Status: SHIPPED | OUTPATIENT
Start: 2019-03-04

## 2019-03-05 ENCOUNTER — PATIENT MESSAGE (OUTPATIENT)
Dept: FAMILY MEDICINE CLINIC | Age: 74
End: 2019-03-05

## 2019-03-08 ENCOUNTER — HOSPITAL ENCOUNTER (OUTPATIENT)
Age: 74
Setting detail: SPECIMEN
Discharge: HOME | End: 2019-03-08
Payer: MEDICARE

## 2019-03-08 DIAGNOSIS — R31.9 HEMATURIA, UNSPECIFIED TYPE: ICD-10-CM

## 2019-03-08 LAB
BILIRUBIN, POC: NEGATIVE
BLOOD URINE, POC: ABNORMAL
CLARITY, POC: CLEAR
COLOR, POC: YELLOW
GLUCOSE URINE, POC: ABNORMAL
KETONES, POC: NEGATIVE
LEUKOCYTE EST, POC: ABNORMAL
NITRITE, POC: NEGATIVE
PH, POC: 6
PROTEIN, POC: NEGATIVE
SPECIFIC GRAVITY, POC: 1
UROBILINOGEN, POC: NEGATIVE

## 2019-03-08 PROCEDURE — 81002 URINALYSIS NONAUTO W/O SCOPE: CPT | Performed by: FAMILY MEDICINE

## 2019-03-09 LAB
CULTURE: NORMAL
Lab: NORMAL
SPECIMEN DESCRIPTION: NORMAL

## 2019-03-12 ENCOUNTER — HOSPITAL ENCOUNTER (OUTPATIENT)
Dept: INFUSION THERAPY | Facility: MEDICAL CENTER | Age: 74
Discharge: HOME | End: 2019-03-12
Payer: COMMERCIAL

## 2019-03-12 ENCOUNTER — HOSPITAL ENCOUNTER (OUTPATIENT)
Facility: MEDICAL CENTER | Age: 74
Discharge: HOME | End: 2019-03-12
Payer: COMMERCIAL

## 2019-03-12 VITALS
TEMPERATURE: 98.3 F | SYSTOLIC BLOOD PRESSURE: 120 MMHG | DIASTOLIC BLOOD PRESSURE: 55 MMHG | HEART RATE: 62 BPM | RESPIRATION RATE: 20 BRPM

## 2019-03-12 DIAGNOSIS — K90.89 OTHER SPECIFIED INTESTINAL MALABSORPTION: Primary | ICD-10-CM

## 2019-03-12 DIAGNOSIS — E61.1 IRON DEFICIENCY: ICD-10-CM

## 2019-03-12 DIAGNOSIS — N18.4 CKD STAGE 4 DUE TO TYPE 2 DIABETES MELLITUS (HCC): Chronic | ICD-10-CM

## 2019-03-12 DIAGNOSIS — E11.22 CKD STAGE 4 DUE TO TYPE 2 DIABETES MELLITUS (HCC): Chronic | ICD-10-CM

## 2019-03-12 DIAGNOSIS — N20.0 NEPHROLITHIASIS: Chronic | ICD-10-CM

## 2019-03-12 DIAGNOSIS — D63.1 ANEMIA OF CHRONIC RENAL FAILURE, STAGE 4 (SEVERE) (HCC): ICD-10-CM

## 2019-03-12 DIAGNOSIS — N18.4 ANEMIA OF CHRONIC RENAL FAILURE, STAGE 4 (SEVERE) (HCC): ICD-10-CM

## 2019-03-12 LAB
ABSOLUTE EOS #: 0.2 K/UL (ref 0–0.4)
ABSOLUTE IMMATURE GRANULOCYTE: ABNORMAL K/UL (ref 0–0.3)
ABSOLUTE LYMPH #: 1.2 K/UL (ref 1–4.8)
ABSOLUTE MONO #: 0.7 K/UL (ref 0.2–0.8)
BASOPHILS # BLD: 0 % (ref 0–2)
BASOPHILS ABSOLUTE: 0 K/UL (ref 0–0.2)
DIFFERENTIAL TYPE: ABNORMAL
EOSINOPHILS RELATIVE PERCENT: 3 % (ref 1–4)
HCT VFR BLD CALC: 29.2 % (ref 36–46)
HEMOGLOBIN: 9.6 G/DL (ref 12–16)
IMMATURE GRANULOCYTES: ABNORMAL %
LYMPHOCYTES # BLD: 14 % (ref 24–44)
MCH RBC QN AUTO: 32.3 PG (ref 26–34)
MCHC RBC AUTO-ENTMCNC: 33 G/DL (ref 31–37)
MCV RBC AUTO: 97.9 FL (ref 80–100)
MONOCYTES # BLD: 8 % (ref 1–7)
NRBC AUTOMATED: ABNORMAL PER 100 WBC
PDW BLD-RTO: 15 % (ref 11.5–14.5)
PLATELET # BLD: 103 K/UL (ref 130–400)
PLATELET ESTIMATE: ABNORMAL
PMV BLD AUTO: 8.5 FL (ref 6–12)
RBC # BLD: 2.98 M/UL (ref 4–5.2)
RBC # BLD: ABNORMAL 10*6/UL
SEG NEUTROPHILS: 75 % (ref 36–66)
SEGMENTED NEUTROPHILS ABSOLUTE COUNT: 6.6 K/UL (ref 1.8–7.7)
WBC # BLD: 8.8 K/UL (ref 3.5–11)
WBC # BLD: ABNORMAL 10*3/UL

## 2019-03-12 PROCEDURE — 96372 THER/PROPH/DIAG INJ SC/IM: CPT

## 2019-03-12 PROCEDURE — 6360000002 HC RX W HCPCS: Performed by: INTERNAL MEDICINE

## 2019-03-12 PROCEDURE — 85025 COMPLETE CBC W/AUTO DIFF WBC: CPT

## 2019-03-12 PROCEDURE — 36415 COLL VENOUS BLD VENIPUNCTURE: CPT

## 2019-03-12 RX ORDER — ACETAMINOPHEN 325 MG/1
325 TABLET ORAL
Status: CANCELLED | OUTPATIENT
Start: 2019-03-12

## 2019-03-12 RX ORDER — CYANOCOBALAMIN 1000 UG/ML
1000 INJECTION INTRAMUSCULAR; SUBCUTANEOUS ONCE
Status: CANCELLED
Start: 2019-03-12

## 2019-03-12 RX ADMIN — DARBEPOETIN ALFA 200 MCG: 200 INJECTION, SOLUTION INTRAVENOUS; SUBCUTANEOUS at 10:04

## 2019-03-18 ENCOUNTER — HOSPITAL ENCOUNTER (OUTPATIENT)
Dept: ULTRASOUND IMAGING | Age: 74
Discharge: HOME | End: 2019-03-20
Payer: COMMERCIAL

## 2019-03-18 DIAGNOSIS — N18.4 STAGE 4 CHRONIC KIDNEY DISEASE (HCC): ICD-10-CM

## 2019-03-18 PROCEDURE — 76770 US EXAM ABDO BACK WALL COMP: CPT

## 2019-03-20 ENCOUNTER — TELEPHONE (OUTPATIENT)
Dept: FAMILY MEDICINE CLINIC | Age: 74
End: 2019-03-20

## 2019-03-20 RX ORDER — IPRATROPIUM BROMIDE AND ALBUTEROL SULFATE 2.5; .5 MG/3ML; MG/3ML
3 SOLUTION RESPIRATORY (INHALATION) 3 TIMES DAILY
Qty: 360 ML | Refills: 3 | Status: ON HOLD | OUTPATIENT
Start: 2019-03-20 | End: 2019-12-06 | Stop reason: ALTCHOICE

## 2019-03-26 ENCOUNTER — HOSPITAL ENCOUNTER (OUTPATIENT)
Dept: INFUSION THERAPY | Facility: MEDICAL CENTER | Age: 74
Discharge: HOME | End: 2019-03-26
Payer: COMMERCIAL

## 2019-03-26 ENCOUNTER — HOSPITAL ENCOUNTER (OUTPATIENT)
Facility: MEDICAL CENTER | Age: 74
Discharge: HOME | End: 2019-03-26
Payer: COMMERCIAL

## 2019-03-26 VITALS
DIASTOLIC BLOOD PRESSURE: 49 MMHG | HEART RATE: 67 BPM | SYSTOLIC BLOOD PRESSURE: 113 MMHG | RESPIRATION RATE: 22 BRPM | TEMPERATURE: 98.2 F

## 2019-03-26 DIAGNOSIS — E61.1 IRON DEFICIENCY: ICD-10-CM

## 2019-03-26 DIAGNOSIS — N18.4 CKD STAGE 4 DUE TO TYPE 2 DIABETES MELLITUS (HCC): Chronic | ICD-10-CM

## 2019-03-26 DIAGNOSIS — E11.22 CKD STAGE 4 DUE TO TYPE 2 DIABETES MELLITUS (HCC): Chronic | ICD-10-CM

## 2019-03-26 DIAGNOSIS — N18.4 ANEMIA OF CHRONIC RENAL FAILURE, STAGE 4 (SEVERE) (HCC): ICD-10-CM

## 2019-03-26 DIAGNOSIS — N20.0 NEPHROLITHIASIS: Chronic | ICD-10-CM

## 2019-03-26 DIAGNOSIS — K90.89 OTHER SPECIFIED INTESTINAL MALABSORPTION: Primary | ICD-10-CM

## 2019-03-26 DIAGNOSIS — D63.1 ANEMIA OF CHRONIC RENAL FAILURE, STAGE 4 (SEVERE) (HCC): ICD-10-CM

## 2019-03-26 LAB
ABSOLUTE EOS #: 0.2 K/UL (ref 0–0.4)
ABSOLUTE IMMATURE GRANULOCYTE: ABNORMAL K/UL (ref 0–0.3)
ABSOLUTE LYMPH #: 1.4 K/UL (ref 1–4.8)
ABSOLUTE MONO #: 0.7 K/UL (ref 0.2–0.8)
BASOPHILS # BLD: 1 % (ref 0–2)
BASOPHILS ABSOLUTE: 0.1 K/UL (ref 0–0.2)
DIFFERENTIAL TYPE: ABNORMAL
EOSINOPHILS RELATIVE PERCENT: 2 % (ref 1–4)
HCT VFR BLD CALC: 31.2 % (ref 36–46)
HEMOGLOBIN: 10.4 G/DL (ref 12–16)
IMMATURE GRANULOCYTES: ABNORMAL %
LYMPHOCYTES # BLD: 17 % (ref 24–44)
MCH RBC QN AUTO: 33 PG (ref 26–34)
MCHC RBC AUTO-ENTMCNC: 33.5 G/DL (ref 31–37)
MCV RBC AUTO: 98.5 FL (ref 80–100)
MONOCYTES # BLD: 9 % (ref 1–7)
NRBC AUTOMATED: ABNORMAL PER 100 WBC
PDW BLD-RTO: 16.3 % (ref 11.5–14.5)
PLATELET # BLD: 115 K/UL (ref 130–400)
PLATELET ESTIMATE: ABNORMAL
PMV BLD AUTO: 8.9 FL (ref 6–12)
RBC # BLD: 3.17 M/UL (ref 4–5.2)
RBC # BLD: ABNORMAL 10*6/UL
SEG NEUTROPHILS: 71 % (ref 36–66)
SEGMENTED NEUTROPHILS ABSOLUTE COUNT: 5.8 K/UL (ref 1.8–7.7)
WBC # BLD: 8.1 K/UL (ref 3.5–11)
WBC # BLD: ABNORMAL 10*3/UL

## 2019-03-26 PROCEDURE — 96372 THER/PROPH/DIAG INJ SC/IM: CPT

## 2019-03-26 PROCEDURE — 36415 COLL VENOUS BLD VENIPUNCTURE: CPT

## 2019-03-26 PROCEDURE — 85025 COMPLETE CBC W/AUTO DIFF WBC: CPT

## 2019-03-26 PROCEDURE — 96374 THER/PROPH/DIAG INJ IV PUSH: CPT

## 2019-03-26 PROCEDURE — 6360000002 HC RX W HCPCS: Performed by: INTERNAL MEDICINE

## 2019-03-26 RX ORDER — ACETAMINOPHEN 325 MG/1
325 TABLET ORAL
Status: ACTIVE | OUTPATIENT
Start: 2019-03-26 | End: 2019-03-26

## 2019-03-26 RX ORDER — CYANOCOBALAMIN 1000 UG/ML
1000 INJECTION INTRAMUSCULAR; SUBCUTANEOUS ONCE
Status: CANCELLED
Start: 2019-03-26

## 2019-03-26 RX ORDER — ACETAMINOPHEN 325 MG/1
325 TABLET ORAL
Status: CANCELLED | OUTPATIENT
Start: 2019-03-26

## 2019-03-26 RX ORDER — CYANOCOBALAMIN 1000 UG/ML
1000 INJECTION INTRAMUSCULAR; SUBCUTANEOUS ONCE
Status: COMPLETED
Start: 2019-03-26 | End: 2019-03-26

## 2019-03-26 RX ADMIN — CYANOCOBALAMIN 1000 MCG: 1000 INJECTION, SOLUTION INTRAMUSCULAR at 10:03

## 2019-03-26 NOTE — PROGRESS NOTES
[place] here per walker and wheel chair. Hemoglobin of 10.4, held aranesp. Vitamin B12 to upper left arm without difficulty. Pt back in two weeks.

## 2019-03-31 ENCOUNTER — PATIENT MESSAGE (OUTPATIENT)
Dept: FAMILY MEDICINE CLINIC | Age: 74
End: 2019-03-31

## 2019-03-31 DIAGNOSIS — J96.11 CHRONIC RESPIRATORY FAILURE WITH HYPOXIA AND HYPERCAPNIA (HCC): ICD-10-CM

## 2019-03-31 DIAGNOSIS — J96.12 CHRONIC RESPIRATORY FAILURE WITH HYPOXIA AND HYPERCAPNIA (HCC): ICD-10-CM

## 2019-03-31 DIAGNOSIS — J44.1 COPD WITH EXACERBATION (HCC): Primary | ICD-10-CM

## 2019-04-01 ENCOUNTER — PATIENT MESSAGE (OUTPATIENT)
Dept: FAMILY MEDICINE CLINIC | Age: 74
End: 2019-04-01

## 2019-04-01 DIAGNOSIS — F51.01 PRIMARY INSOMNIA: Primary | ICD-10-CM

## 2019-04-01 RX ORDER — NEBULIZER ACCESSORIES
1 KIT MISCELLANEOUS DAILY PRN
Qty: 1 KIT | Refills: 0 | Status: SHIPPED | OUTPATIENT
Start: 2019-04-01 | End: 2019-05-03

## 2019-04-02 RX ORDER — ALPRAZOLAM 0.25 MG/1
0.25 TABLET ORAL NIGHTLY PRN
Qty: 30 TABLET | Refills: 0 | Status: SHIPPED | OUTPATIENT
Start: 2019-04-02 | End: 2019-05-03 | Stop reason: SDUPTHER

## 2019-04-02 NOTE — TELEPHONE ENCOUNTER
From: Asha Query  To: Vera Fernandez  Sent: 4/1/2019 7:29 PM EDT  Subject: RE: Prescription Question    ho Doctor-    She says it relaxes her so she can sleep. She's always had insomnia. I am giving her her 5mg of melatonin wit it. Priscilla Thorne says she can try to go without it. So it's up to you. [facility]  ----- Message -----  From: Luis Fernando Arteaga DO  Sent: 4/1/19, 6:33 PM  To: Asha Guallpa  Subject: RE: Prescription Question    She was even using less of the Xanax. Why is she needing this even night now ?    ----- Message -----   From: Asha Query   Sent: 4/1/2019 5:50 PM EDT   To: Annabelle Ramos  Subject: RE: Prescription Question    Roland Vargas-    We scheduled for next week, no other appointments were left. Can the doctor still give her a supply of Alprazolam .05 until next Tuesday from [facility] please? I closely monitor this medicine and she only takes one at bedtime. Shes out now. Thanks    [facility]!    ----- Message -----  From: Annabelle Ramos  Sent: 4/1/2019 1:30 PM EDT  To: Asha Guallpa  Subject: RE: Prescription Question  Regarding her refills, she is due for her 3 month check. Please make her an appointment. I will put an order in for the nebulizer. ----- Message -----   From: Asha Guallpa   Sent: 3/31/2019 11:54 AM EDT   To: Luis Fernando Arteaga DO  Subject: Prescription Question    Most of [facility] prescriptions are set to cannot be refilled? She need a refill now for Alprazolam .05 mg. Also, Can the doctor please send over a new prescrition to [facility]  for a neutralizer? (for breathingfor treatments)    Her's is not working, and we need one right away. Were borrowing one  from a friend and need to return it. Thank you.   [facility]

## 2019-04-09 ENCOUNTER — OFFICE VISIT (OUTPATIENT)
Dept: FAMILY MEDICINE CLINIC | Age: 74
End: 2019-04-09
Payer: COMMERCIAL

## 2019-04-09 ENCOUNTER — HOSPITAL ENCOUNTER (OUTPATIENT)
Age: 74
Setting detail: SPECIMEN
Discharge: HOME | End: 2019-04-09
Payer: MEDICARE

## 2019-04-09 ENCOUNTER — HOSPITAL ENCOUNTER (OUTPATIENT)
Facility: MEDICAL CENTER | Age: 74
Discharge: HOME | End: 2019-04-09
Payer: COMMERCIAL

## 2019-04-09 ENCOUNTER — HOSPITAL ENCOUNTER (OUTPATIENT)
Dept: INFUSION THERAPY | Facility: MEDICAL CENTER | Age: 74
Discharge: HOME | End: 2019-04-09
Payer: COMMERCIAL

## 2019-04-09 ENCOUNTER — TELEPHONE (OUTPATIENT)
Dept: INFUSION THERAPY | Facility: MEDICAL CENTER | Age: 74
End: 2019-04-09

## 2019-04-09 VITALS
OXYGEN SATURATION: 97 % | RESPIRATION RATE: 18 BRPM | BODY MASS INDEX: 32.43 KG/M2 | WEIGHT: 183 LBS | SYSTOLIC BLOOD PRESSURE: 123 MMHG | DIASTOLIC BLOOD PRESSURE: 66 MMHG | HEIGHT: 63 IN | HEART RATE: 76 BPM

## 2019-04-09 DIAGNOSIS — R60.0 EDEMA OF BOTH LEGS: ICD-10-CM

## 2019-04-09 DIAGNOSIS — J44.1 COPD WITH EXACERBATION (HCC): ICD-10-CM

## 2019-04-09 DIAGNOSIS — N20.0 NEPHROLITHIASIS: Chronic | ICD-10-CM

## 2019-04-09 DIAGNOSIS — D63.1 ANEMIA IN CHRONIC KIDNEY DISEASE, UNSPECIFIED CKD STAGE: ICD-10-CM

## 2019-04-09 DIAGNOSIS — E11.22 CONTROLLED TYPE 2 DIABETES MELLITUS WITH STAGE 3 CHRONIC KIDNEY DISEASE, WITHOUT LONG-TERM CURRENT USE OF INSULIN (HCC): Primary | ICD-10-CM

## 2019-04-09 DIAGNOSIS — N18.30 CONTROLLED TYPE 2 DIABETES MELLITUS WITH STAGE 3 CHRONIC KIDNEY DISEASE, WITHOUT LONG-TERM CURRENT USE OF INSULIN (HCC): ICD-10-CM

## 2019-04-09 DIAGNOSIS — E21.3 HYPERPARATHYROIDISM (HCC): ICD-10-CM

## 2019-04-09 DIAGNOSIS — N18.30 CONTROLLED TYPE 2 DIABETES MELLITUS WITH STAGE 3 CHRONIC KIDNEY DISEASE, WITHOUT LONG-TERM CURRENT USE OF INSULIN (HCC): Primary | ICD-10-CM

## 2019-04-09 DIAGNOSIS — N18.9 ANEMIA IN CHRONIC KIDNEY DISEASE, UNSPECIFIED CKD STAGE: ICD-10-CM

## 2019-04-09 DIAGNOSIS — G20 PARKINSON'S DISEASE (HCC): ICD-10-CM

## 2019-04-09 DIAGNOSIS — E11.22 CONTROLLED TYPE 2 DIABETES MELLITUS WITH STAGE 3 CHRONIC KIDNEY DISEASE, WITHOUT LONG-TERM CURRENT USE OF INSULIN (HCC): ICD-10-CM

## 2019-04-09 DIAGNOSIS — E11.22 CKD STAGE 4 DUE TO TYPE 2 DIABETES MELLITUS (HCC): Chronic | ICD-10-CM

## 2019-04-09 DIAGNOSIS — J44.9 CHRONIC OBSTRUCTIVE PULMONARY DISEASE, UNSPECIFIED COPD TYPE (HCC): ICD-10-CM

## 2019-04-09 DIAGNOSIS — N18.4 CKD STAGE 4 DUE TO TYPE 2 DIABETES MELLITUS (HCC): Chronic | ICD-10-CM

## 2019-04-09 DIAGNOSIS — I10 ESSENTIAL HYPERTENSION: ICD-10-CM

## 2019-04-09 LAB
ABSOLUTE EOS #: 0.2 K/UL (ref 0–0.4)
ABSOLUTE IMMATURE GRANULOCYTE: ABNORMAL K/UL (ref 0–0.3)
ABSOLUTE LYMPH #: 1.5 K/UL (ref 1–4.8)
ABSOLUTE MONO #: 0.5 K/UL (ref 0.2–0.8)
ALBUMIN SERPL-MCNC: 4.2 G/DL (ref 3.5–5.2)
ANION GAP SERPL CALCULATED.3IONS-SCNC: 14 MMOL/L (ref 9–17)
BASOPHILS # BLD: 1 % (ref 0–2)
BASOPHILS ABSOLUTE: 0.1 K/UL (ref 0–0.2)
BUN BLDV-MCNC: 38 MG/DL (ref 8–23)
BUN/CREAT BLD: ABNORMAL (ref 9–20)
CALCIUM IONIZED: 1.19 MMOL/L (ref 1.13–1.33)
CALCIUM SERPL-MCNC: 9.1 MG/DL (ref 8.6–10.4)
CHLORIDE BLD-SCNC: 102 MMOL/L (ref 98–107)
CO2: 29 MMOL/L (ref 20–31)
CREAT SERPL-MCNC: 1.77 MG/DL (ref 0.5–0.9)
DIFFERENTIAL TYPE: ABNORMAL
EOSINOPHILS RELATIVE PERCENT: 3 % (ref 1–4)
ESTIMATED AVERAGE GLUCOSE: 111 MG/DL
GFR AFRICAN AMERICAN: 34 ML/MIN
GFR NON-AFRICAN AMERICAN: 28 ML/MIN
GFR SERPL CREATININE-BSD FRML MDRD: ABNORMAL ML/MIN/{1.73_M2}
GFR SERPL CREATININE-BSD FRML MDRD: ABNORMAL ML/MIN/{1.73_M2}
GLUCOSE BLD-MCNC: 107 MG/DL (ref 70–99)
HBA1C MFR BLD: 5.5 % (ref 4–6)
HCT VFR BLD CALC: 31.2 % (ref 36–46)
HEMOGLOBIN: 10.4 G/DL (ref 12–16)
IMMATURE GRANULOCYTES: ABNORMAL %
LYMPHOCYTES # BLD: 19 % (ref 24–44)
MCH RBC QN AUTO: 32.3 PG (ref 26–34)
MCHC RBC AUTO-ENTMCNC: 33.5 G/DL (ref 31–37)
MCV RBC AUTO: 96.6 FL (ref 80–100)
MONOCYTES # BLD: 6 % (ref 1–7)
NRBC AUTOMATED: ABNORMAL PER 100 WBC
PDW BLD-RTO: 15.1 % (ref 11.5–14.5)
PHOSPHORUS: 3.6 MG/DL (ref 2.6–4.5)
PLATELET # BLD: 128 K/UL (ref 130–400)
PLATELET ESTIMATE: ABNORMAL
PMV BLD AUTO: 8.7 FL (ref 6–12)
POTASSIUM SERPL-SCNC: 4.2 MMOL/L (ref 3.7–5.3)
PTH INTACT: 7.05 PG/ML (ref 15–65)
RBC # BLD: 3.24 M/UL (ref 4–5.2)
RBC # BLD: ABNORMAL 10*6/UL
SEG NEUTROPHILS: 71 % (ref 36–66)
SEGMENTED NEUTROPHILS ABSOLUTE COUNT: 5.7 K/UL (ref 1.8–7.7)
SODIUM BLD-SCNC: 145 MMOL/L (ref 135–144)
WBC # BLD: 7.9 K/UL (ref 3.5–11)
WBC # BLD: ABNORMAL 10*3/UL

## 2019-04-09 PROCEDURE — 4040F PNEUMOC VAC/ADMIN/RCVD: CPT | Performed by: FAMILY MEDICINE

## 2019-04-09 PROCEDURE — 85025 COMPLETE CBC W/AUTO DIFF WBC: CPT

## 2019-04-09 PROCEDURE — G8598 ASA/ANTIPLAT THER USED: HCPCS | Performed by: FAMILY MEDICINE

## 2019-04-09 PROCEDURE — 1036F TOBACCO NON-USER: CPT | Performed by: FAMILY MEDICINE

## 2019-04-09 PROCEDURE — 3046F HEMOGLOBIN A1C LEVEL >9.0%: CPT | Performed by: FAMILY MEDICINE

## 2019-04-09 PROCEDURE — 36415 COLL VENOUS BLD VENIPUNCTURE: CPT

## 2019-04-09 PROCEDURE — 1090F PRES/ABSN URINE INCON ASSESS: CPT | Performed by: FAMILY MEDICINE

## 2019-04-09 PROCEDURE — G8400 PT W/DXA NO RESULTS DOC: HCPCS | Performed by: FAMILY MEDICINE

## 2019-04-09 PROCEDURE — 3017F COLORECTAL CA SCREEN DOC REV: CPT | Performed by: FAMILY MEDICINE

## 2019-04-09 PROCEDURE — 99214 OFFICE O/P EST MOD 30 MIN: CPT | Performed by: FAMILY MEDICINE

## 2019-04-09 PROCEDURE — 2022F DILAT RTA XM EVC RTNOPTHY: CPT | Performed by: FAMILY MEDICINE

## 2019-04-09 PROCEDURE — G8417 CALC BMI ABV UP PARAM F/U: HCPCS | Performed by: FAMILY MEDICINE

## 2019-04-09 PROCEDURE — 1123F ACP DISCUSS/DSCN MKR DOCD: CPT | Performed by: FAMILY MEDICINE

## 2019-04-09 PROCEDURE — G8427 DOCREV CUR MEDS BY ELIG CLIN: HCPCS | Performed by: FAMILY MEDICINE

## 2019-04-09 RX ORDER — ROPINIROLE 2 MG/1
2 TABLET, FILM COATED ORAL 3 TIMES DAILY
Qty: 270 TABLET | Refills: 3 | Status: SHIPPED | OUTPATIENT
Start: 2019-04-09

## 2019-04-09 RX ORDER — CARBIDOPA AND LEVODOPA 25; 100 MG/1; MG/1
1 TABLET, EXTENDED RELEASE ORAL 4 TIMES DAILY
Qty: 360 TABLET | Refills: 3 | Status: SHIPPED | OUTPATIENT
Start: 2019-04-09

## 2019-04-09 ASSESSMENT — PATIENT HEALTH QUESTIONNAIRE - PHQ9
SUM OF ALL RESPONSES TO PHQ QUESTIONS 1-9: 0
SUM OF ALL RESPONSES TO PHQ9 QUESTIONS 1 & 2: 0
2. FEELING DOWN, DEPRESSED OR HOPELESS: 0
SUM OF ALL RESPONSES TO PHQ QUESTIONS 1-9: 0
1. LITTLE INTEREST OR PLEASURE IN DOING THINGS: 0

## 2019-04-09 NOTE — TELEPHONE ENCOUNTER
Hemoglobin of 10.4/hct of 31.4. Held aranesp per orders. Informed pt and her daughter. Schedule for 04/23/2019 given to pt.

## 2019-04-09 NOTE — PROGRESS NOTES
[facility]  [address] [place] [id number]  Dept: [phone]      Evelyn Lacey is a 72 y.o. female who presents today for follow up on her  medical conditions as noted below.       Chief Complaint   Patient presents with    3 Month Follow-Up       Patient Active Problem List:     Controlled type 2 diabetes mellitus with stage 3 chronic kidney disease, without long-term current use of insulin (HCC)     Hyperlipidemia     Essential hypertension     Chronic leg pain     Chronic atrial fibrillation (HCC)     Asthma     Gastroesophageal reflux disease without esophagitis     ECTOR on CPAP     Type 2 diabetes mellitus without complication, without long-term current use of insulin (HCC)     Spinal stenosis in cervical region     Hypomagnesemia     Hyperphosphaturia     Hypocalcemia     Parkinson's disease ([address]     Acute renal failure ([address]     Acute cystitis with hematuria     Altered mental status     Iron deficiency anemia due to chronic blood loss     Morbid obesity with BMI of 40.0-44.9, adult ([address]     Hyperplastic polyp of intestine     Diverticulosis     Panlobular emphysema ([facility])     Rapid atrial fibrillation ([facility])     CKD (chronic kidney disease) stage 3, GFR 30-59 ml/min ([facility])     KRISTIN (acute kidney injury) ([address]     Anemia in chronic kidney disease     Chronic respiratory failure with hypoxia and hypercapnia ([facility])     CKD stage 3 due to type 2 diabetes mellitus (HCC)     E. coli UTI (urinary tract infection)     Nephrolithiasis     Candidal intertrigo     Venous insufficiency     Malabsorption     Anemia of chronic renal failure, stage 4 (severe) ([facility])     Iron deficiency     Coronary atherosclerosis     COPD exacerbation (HCC)     Restless leg syndrome     SIRS (systemic inflammatory response syndrome) ([facility])     Nausea and vomiting in adult     Bacterial UTI     Odynophagia     Esophageal dysphagia     Candida esophagitis ([address]     Sepsis due to urinary tract infection (HCC)     Chronic respiratory failure (HCC)     Chronic diastolic congestive heart failure (HCC)     History of ESBL E. coli infection     Stage 4 chronic kidney disease (HCC)     Fever     Macrocytic anemia     Acute kidney injury ([address]     Hypercalcemia     Monoclonal gammopathy of undetermined significance     Acute nonintractable headache     Anemia of chronic renal failure, stage 4 (severe) (HCC)     Past Medical History:   Diagnosis Date    Acute on chronic diastolic congestive heart failure [facility]     Anesthesia complication     DIFFICULTY WAKING OP    Asthma     Atrial fibrillation ([address] 2013    Cataracts, bilateral     Cellulitis     BILAT LOWER LEGS-PT STATES IS IMPROVING    Cerebral artery occlusion with cerebral infarction [facility])     Last stroke was February 2017 w/no deficits-TOTAL OF 3    Chronic kidney disease 2013    NOT ON DIALYSIS YET    Constipation     CHRONIC    COPD (chronic obstructive pulmonary disease) ([facility] 2008    PT. SEES DR. BECK. Home O2 at 2-3L/NC 24/7.     Difficult intravenous access     VEINS ROLL    Diverticulosis     DM2 (diabetes mellitus, type 2) ([facility] 2008    Full dentures     FULL UPPER ONLY    GERD (gastroesophageal reflux disease) 2008    ON RX    Headache(784.0)     Augustine (hard of hearing)     HAS HEARING AIDS BUT DOES NOT WEAR    Hyperlipidemia     Hyperplastic polyp of intestine     Hypertension 2008    Impaired ambulation     USES TRANFER CHAIR WALKER OR CANE    Kidney stone 2018    PRESENTLY-SMALL    Morbid obesity with BMI of 40.0-44.9, adult ([facility] 12/30/2016    ECTOR on CPAP 2008    USES C-PAP NIGHTLY    Osteoarthritis     Parkinson disease ([facility] 2015    Restless leg syndrome 2013    MILD    Spinal stenosis in cervical region 6/2/2013    Type II or unspecified type diabetes mellitus without mention of complication, not stated as uncontrolled     Unspecified sleep apnea     cpap nightly    Urethral caruncle 3/8/2018    Wears glasses       Past Surgical History:   Procedure Laterality Date    APPENDECTOMY      BRONCHOSCOPY  06/05/2018    CERVICAL [address] SURGERY  2012    CERVICAL SPINE SURGERY  5/31/13    posterior [id number]    COLONOSCOPY  2009    COLONOSCOPY  12/29/2016    incomplete was not cleaned out    COLONOSCOPY  12/30/2016    COLONOSCOPY  04/25/2017     SIGMOID COLON POLYPECTOMY:  HYPERPLASTIC POLYP ,   DIVERTICULOSIS    CYSTORRHAPHY  04/04/2018    EYE SURGERY Bilateral 2017    CATARACTS W/ IOL    HERNIA REPAIR  1999    VENTRAL    LAMINECTOMY  05/31/2013    Dr. Lori Cain [address] INCL [address] DX W/CELL WASHG [facility] N/A 6/5/2018    BRONCHOSCOPY performed by Rhina Salomon MD at [facility] FLX W/REMOVAL LESION BY HOT BX FORCEPS N/A 4/25/2017    COLONOSCOPY POLYPECTOMY HOT BIOPSY performed by Muriel Gonsalves MD at [facility] N/A 4/4/2018    CYSTOSCOPY performed by Dedrick Ormond, MD at [facility]  12/28/2016    gastritis, esophagitis,     UPPER GASTROINTESTINAL ENDOSCOPY  08/29/2018    with biopsy    UPPER GASTROINTESTINAL ENDOSCOPY N/A 8/29/2018    EGD BIOPSY performed by Muriel Gonsalves MD at [facility] History   Problem Relation Age of Onset    Heart Failure Mother     Hypertension Mother     Heart Disease Mother     High Blood Pressure Mother        Current Outpatient Medications   Medication Sig Dispense Refill    metoprolol tartrate (LOPRESSOR) 25 MG tablet Take 1 tablet by mouth daily 90 tablet 3    carbidopa-levodopa (SINEMET CR)  MG per extended release tablet Take 1 tablet by mouth 4 times daily 360 tablet 3    rOPINIRole (REQUIP) 2 MG tablet Take 1 tablet by mouth 3 times daily 270 tablet 3    ALPRAZolam (XANAX) 0.25 MG tablet Take 1 tablet by mouth nightly as needed for Sleep or Anxiety for up to 30 days.  30 tablet 0    Respiratory Therapy Supplies (NEBULIZER/TUBING/MOUTHPIECE) KIT 1 kit by Does not apply route daily as needed (every 4-6 hours prn for sob, wheezing) 1 kit 0    ipratropium-albuterol (DUONEB) 0.5-2.5 (3) MG/3ML SOLN nebulizer solution Inhale 3 mLs into the lungs three times daily 360 mL 3    ONETOUCH VERIO strip 1 each by In Vitro route daily As needed. 100 each 3    pantoprazole (PROTONIX) 40 MG tablet TAKE 1 TABLET TWICE A DAY BEFORE MEALS 180 tablet 3    linagliptin (TRADJENTA) 5 MG tablet Take 0.5 tablets by mouth daily 90 tablet 1    BREO ELLIPTA 100-25 MCG/INH AEPB inhaler USE 1 INHALATION DAILY 180 each 5    spironolactone (ALDACTONE) 25 MG tablet Take 1 tablet by mouth daily 90 tablet 3    ONE TOUCH ULTRA TEST strip USE 1 STRIP THREE TIMES A  each 3    cyanocobalamin 1000 MCG/ML injection Inject 1,000 mcg into the muscle See Admin Instructions Every 3 weeks      Cyanocobalamin (VITAMIN B-12) 2500 MCG SUBL Place 2,500 mcg under the tongue daily      conjugated estrogens (PREMARIN) 0.625 MG/GM vaginal cream Place 0.5 g vaginally daily as needed Place vaginally daily.  albuterol (ACCUNEB) 1.25 MG/3ML nebulizer solution Inhale 1 ampule into the lungs every 6 hours as needed for Wheezing or Shortness of Breath      ONETOUCH DELICA LANCETS 72M MISC 1 Units by Does not apply route 2 times daily 100 each 3    nystatin (MYCOSTATIN) 075088 UNIT/GM powder Apply 3 times daily.  60 g 5    amiodarone (CORDARONE) 200 MG tablet Take 200 mg by mouth daily       melatonin 3 MG TABS tablet Take 1 tablet by mouth nightly as needed (sleep)  3    magnesium oxide (MAG-OX) 400 (241.3 Mg) MG TABS tablet Take 1 tablet by mouth 2 times daily 30 tablet     calcitRIOL (ROCALTROL) 0.25 MCG capsule TAKE 1 CAPSULE THREE TIMES A  capsule 2    atorvastatin (LIPITOR) 20 MG tablet TAKE 1 TABLET DAILY 90 tablet 2    CALCIUM CITRATE PO Take 500 mg by mouth 3 times daily       senna (SENOKOT) 8.6 MG tablet Take 2 tablets by mouth nightly  aspirin 81 MG tablet Take 81 mg by mouth daily       ferrous sulfate 325 (65 Fe) MG EC tablet Take 1 tablet by mouth 2 times daily (with meals) 90 tablet 3    vitamin D (CHOLECALCIFEROL) 5000 units CAPS capsule Take 5,000 Units by mouth daily      rasagiline mesylate 1 MG TABS Take 1 tablet by mouth daily      Oxygen Concentrator Dx: Pneumonia, use as directed. (Patient taking differently: Dx: Pneumonia, use as directed. ON ) 1 each 0     No current facility-administered medications for this visit. ALLERGIES:    Allergies   Allergen Reactions    Dye [Barium-Containing Compounds] Other (See Comments)     Cause Afib per     Pcn [Penicillins] Itching and Swelling    Bactrim [Sulfamethoxazole-Trimethoprim] Other (See Comments)     Allergic Nephritis       Social History     Tobacco Use    Smoking status: Former Smoker     Packs/day: 2.00     Years: 15.00     Pack years: 30.00     Types: Cigarettes     Last attempt to quit: 10/31/1970     Years since quittin.4    Smokeless tobacco: Never Used   Substance Use Topics    Alcohol use: Yes     Alcohol/week: 1.2 oz     Types: 2 Shots of liquor per week     Comment: 4 DRINKS A YEAR        LDL Cholesterol (mg/dL)   Date Value   2017 73     HDL (mg/dL)   Date Value   2017 30 (L)     BUN (mg/dL)   Date Value   2019 33 (H)     CREATININE (mg/dL)   Date Value   2019 2.13 (H)     Glucose (mg/dL)   Date Value   2019 157 (H)   2012 132 (H)     Hemoglobin A1C (%)   Date Value   2018 5.4     Microalb/Crt.  Ratio (mcg/mg creat)   Date Value   2017 174 (H)              Subjective:      HPI  she is here today for follow-up of many of her ongoing problems she is doing much better she is finally gotten out of the wheelchair and is using a walker  She needs refills of several of her medications and she needs blood work to check her renal function and her hypoparathyroidism  She did state she is having some leg cramping  She did follow-up with the hematologist today  Is also here for follow-up on her COPD. She still has ongoing problems with short of breath and wheezing she needs a new aerosol machine hers is not working at all. She requires treatments on a regular basis. Using an inhaler alone does not work well for her and does not keep her controlled. Review of Systems:     Constitutional: Negative for fever, appetite change and fatigue. Family social and medical history reviewed and unchanged     HENT: Negative. Negative for nosebleeds, trouble swallowing and neck pain. Eyes: Negative for photophobia and visual disturbance. Respiratory: Negative. Negative for chest tightness and shortness of breath. Cardiovascular: Negative. Negative for chest pain and leg swelling. Gastrointestinal: Negative. Negative for abdominal pain and blood in stool. Endocrine: Negative for cold intolerance and polyuria. Genitourinary: Negative for dysuria and hematuria. Musculoskeletal: Negative. Skin: Negative for rash. Allergic/Immunologic: Negative. Neurological: Negative. Negative for dizziness, weakness and numbness. Hematological: Negative. Negative for adenopathy. Does not bruise/bleed easily. Psychiatric/Behavioral: Negative for sleep disturbance, dysphoric mood and  decreased concentration. The patient is not nervous/anxious. Objective:     Physical Exam:     Nursing note and vitals reviewed. /66   Pulse 76   Resp 18   Ht 5' 2.99\" (1.6 m)   Wt 183 lb (83 kg)   LMP 04/03/2004 (Within Years)   SpO2 97%   BMI 32.43 kg/m²   Constitutional: She is oriented to person, place, and time. She   appears well-developed and well-nourished. HENT:   Head: Normocephalic and atraumatic. Right Ear: External ear normal. Tympanic membrane is not erythematous. No middle ear effusion. Left Ear: External ear normal. Tympanic membrane is not erythematous. No middle ear effusion.    Nose: No mucosal edema. Mouth/Throat: Oropharynx is clear and moist. No posterior oropharyngeal erythema. Eyes: Conjunctivae and EOM are normal. Pupils are equal, round, and reactive to light. Neck: Normal range of motion. Neck supple. No thyromegaly present. Cardiovascular: Normal rate, regular rhythm and normal heart sounds. No murmur heard. Pulmonary/Chest: Effort normal and breath sounds normal. She has no wheezes. Shehas no rales. Abdominal: Soft. Bowel sounds are normal. She exhibits no distension and no mass. There is no tenderness. There is no rebound and no guarding. Genitourinary/Anorectal:deferred  Musculoskeletal: Normal range of motion. She exhibits +  Edema of the r leg  or tenderness. Lymphadenopathy: She has no cervical adenopathy. Neurological: She is alert and oriented to person, place, and time. She has normal reflexes. Skin: Skin is warm and dry. No rash noted. Psychiatric: She has a normal mood and affect. Her   behavior is normal.       Assessment:      1. Controlled type 2 diabetes mellitus with stage 3 chronic kidney disease, without long-term current use of insulin ([address]    2. Edema of both legs    3. Essential hypertension    4. Anemia in chronic kidney disease, unspecified CKD stage    5. Parkinson's disease ([address]    6. Hyperparathyroidism ([address]          Plan:      Call or return to clinic prn if these symptoms worsen or fail to improve as anticipated. I have reviewed the instructions with the patient, answering all questions to her satisfaction. No follow-ups on file.   Orders Placed This Encounter   Procedures    Renal Function Panel     Standing Status:   Future     Standing Expiration Date:   4/8/2020    Hemoglobin A1C     Standing Status:   Future     Standing Expiration Date:   4/8/2020    Microalbumin, Ur     Standing Status:   Future     Standing Expiration Date:   4/9/2020    Calcium, Ionized     Standing Status:   Future     Standing Expiration Date:

## 2019-04-10 ENCOUNTER — HOSPITAL ENCOUNTER (OUTPATIENT)
Age: 74
Setting detail: SPECIMEN
Discharge: HOME | End: 2019-04-10
Payer: COMMERCIAL

## 2019-04-10 DIAGNOSIS — E11.22 CONTROLLED TYPE 2 DIABETES MELLITUS WITH STAGE 3 CHRONIC KIDNEY DISEASE, WITHOUT LONG-TERM CURRENT USE OF INSULIN (HCC): ICD-10-CM

## 2019-04-10 DIAGNOSIS — N18.30 CONTROLLED TYPE 2 DIABETES MELLITUS WITH STAGE 3 CHRONIC KIDNEY DISEASE, WITHOUT LONG-TERM CURRENT USE OF INSULIN (HCC): ICD-10-CM

## 2019-04-10 LAB
CREATININE URINE: 85.8 MG/DL (ref 28–217)
MICROALBUMIN/CREAT 24H UR: 71 MG/L
MICROALBUMIN/CREAT UR-RTO: 83 MCG/MG CREAT

## 2019-04-10 PROCEDURE — 82043 UR ALBUMIN QUANTITATIVE: CPT

## 2019-04-10 PROCEDURE — 82570 ASSAY OF URINE CREATININE: CPT

## 2019-04-15 ENCOUNTER — TELEPHONE (OUTPATIENT)
Dept: FAMILY MEDICINE CLINIC | Age: 74
End: 2019-04-15

## 2019-04-15 NOTE — TELEPHONE ENCOUNTER
Spoke with her  Heraclio Esquivel. No leg swelling, redness of leg, no sob or chest pains. She feels ok overall. He states she wakes up more at night due to the pain. He is giving her tylenol which is helping her pain. I told him he can use a heating pad,ice if the pain is too bad and he should limit the amount of tylenol he is giving her due to her kidney function which he will make an appointment to see the nephrologist.     Per dr Alexandra Tracey he should continue the magnesium 400mg. He is giving her that daily as directed for the past 6 months.

## 2019-04-15 NOTE — TELEPHONE ENCOUNTER
Pt  states pt is having bad leg pain up and down her leg off and on for the past couple days and today it is really bad. Pt has tried tylenol with no relief.  Please advise

## 2019-04-18 RX ORDER — ATORVASTATIN CALCIUM 20 MG/1
TABLET, FILM COATED ORAL
Qty: 90 TABLET | Refills: 2 | Status: SHIPPED | OUTPATIENT
Start: 2019-04-18

## 2019-04-22 ENCOUNTER — TELEPHONE (OUTPATIENT)
Dept: FAMILY MEDICINE CLINIC | Age: 74
End: 2019-04-22

## 2019-04-22 RX ORDER — SOFT LENS DISINFECTANT
SOLUTION, NON-ORAL MISCELLANEOUS
Qty: 1 DEVICE | Refills: 0 | Status: SHIPPED | OUTPATIENT
Start: 2019-04-22 | End: 2019-05-03

## 2019-04-22 NOTE — TELEPHONE ENCOUNTER
Pt Needs new nebulizer script with Dx: COPD and chart note within last 6 months stating pt has COPD. Nebulizer script wont be accepted with SOB, Wheezing.   Alex 42 [phone] (F)

## 2019-04-23 ENCOUNTER — TELEPHONE (OUTPATIENT)
Dept: ONCOLOGY | Age: 74
End: 2019-04-23

## 2019-04-23 ENCOUNTER — OFFICE VISIT (OUTPATIENT)
Dept: ONCOLOGY | Age: 74
End: 2019-04-23
Payer: COMMERCIAL

## 2019-04-23 ENCOUNTER — HOSPITAL ENCOUNTER (OUTPATIENT)
Facility: MEDICAL CENTER | Age: 74
Discharge: HOME | End: 2019-04-23
Payer: COMMERCIAL

## 2019-04-23 ENCOUNTER — HOSPITAL ENCOUNTER (OUTPATIENT)
Dept: INFUSION THERAPY | Facility: MEDICAL CENTER | Age: 74
Discharge: HOME | End: 2019-04-23
Payer: COMMERCIAL

## 2019-04-23 VITALS
BODY MASS INDEX: 33.1 KG/M2 | SYSTOLIC BLOOD PRESSURE: 131 MMHG | WEIGHT: 186.8 LBS | RESPIRATION RATE: 18 BRPM | TEMPERATURE: 98 F | DIASTOLIC BLOOD PRESSURE: 60 MMHG | HEART RATE: 60 BPM

## 2019-04-23 VITALS
SYSTOLIC BLOOD PRESSURE: 131 MMHG | TEMPERATURE: 98 F | DIASTOLIC BLOOD PRESSURE: 60 MMHG | RESPIRATION RATE: 18 BRPM | HEART RATE: 60 BPM

## 2019-04-23 DIAGNOSIS — K90.89 OTHER SPECIFIED INTESTINAL MALABSORPTION: Primary | ICD-10-CM

## 2019-04-23 DIAGNOSIS — D50.0 IRON DEFICIENCY ANEMIA DUE TO CHRONIC BLOOD LOSS: ICD-10-CM

## 2019-04-23 DIAGNOSIS — N18.4 STAGE 4 CHRONIC KIDNEY DISEASE (HCC): ICD-10-CM

## 2019-04-23 DIAGNOSIS — D63.1 ANEMIA OF CHRONIC RENAL FAILURE, STAGE 4 (SEVERE) (HCC): ICD-10-CM

## 2019-04-23 DIAGNOSIS — N18.4 ANEMIA OF CHRONIC RENAL FAILURE, STAGE 4 (SEVERE) (HCC): Primary | ICD-10-CM

## 2019-04-23 DIAGNOSIS — N18.4 ANEMIA OF CHRONIC RENAL FAILURE, STAGE 4 (SEVERE) (HCC): ICD-10-CM

## 2019-04-23 DIAGNOSIS — N20.0 NEPHROLITHIASIS: Chronic | ICD-10-CM

## 2019-04-23 DIAGNOSIS — D63.1 ANEMIA OF CHRONIC RENAL FAILURE, STAGE 4 (SEVERE) (HCC): Primary | ICD-10-CM

## 2019-04-23 DIAGNOSIS — E61.1 IRON DEFICIENCY: ICD-10-CM

## 2019-04-23 DIAGNOSIS — N18.4 CKD STAGE 4 DUE TO TYPE 2 DIABETES MELLITUS (HCC): Chronic | ICD-10-CM

## 2019-04-23 DIAGNOSIS — E11.22 CKD STAGE 4 DUE TO TYPE 2 DIABETES MELLITUS (HCC): Chronic | ICD-10-CM

## 2019-04-23 DIAGNOSIS — D47.2 MONOCLONAL GAMMOPATHY OF UNDETERMINED SIGNIFICANCE: ICD-10-CM

## 2019-04-23 LAB
ABSOLUTE EOS #: 0.3 K/UL (ref 0–0.4)
ABSOLUTE IMMATURE GRANULOCYTE: ABNORMAL K/UL (ref 0–0.3)
ABSOLUTE LYMPH #: 1.2 K/UL (ref 1–4.8)
ABSOLUTE MONO #: 0.7 K/UL (ref 0.2–0.8)
BASOPHILS # BLD: 0 % (ref 0–2)
BASOPHILS ABSOLUTE: 0 K/UL (ref 0–0.2)
DIFFERENTIAL TYPE: ABNORMAL
EOSINOPHILS RELATIVE PERCENT: 4 % (ref 1–4)
HCT VFR BLD CALC: 28.7 % (ref 36–46)
HEMOGLOBIN: 9.6 G/DL (ref 12–16)
IMMATURE GRANULOCYTES: ABNORMAL %
LYMPHOCYTES # BLD: 16 % (ref 24–44)
MCH RBC QN AUTO: 32.3 PG (ref 26–34)
MCHC RBC AUTO-ENTMCNC: 33.4 G/DL (ref 31–37)
MCV RBC AUTO: 96.6 FL (ref 80–100)
MONOCYTES # BLD: 8 % (ref 1–7)
NRBC AUTOMATED: ABNORMAL PER 100 WBC
PDW BLD-RTO: 16.2 % (ref 11.5–14.5)
PLATELET # BLD: 104 K/UL (ref 130–400)
PLATELET ESTIMATE: ABNORMAL
PMV BLD AUTO: 8.6 FL (ref 6–12)
RBC # BLD: 2.97 M/UL (ref 4–5.2)
RBC # BLD: ABNORMAL 10*6/UL
SEG NEUTROPHILS: 72 % (ref 36–66)
SEGMENTED NEUTROPHILS ABSOLUTE COUNT: 5.7 K/UL (ref 1.8–7.7)
WBC # BLD: 7.9 K/UL (ref 3.5–11)
WBC # BLD: ABNORMAL 10*3/UL

## 2019-04-23 PROCEDURE — 99214 OFFICE O/P EST MOD 30 MIN: CPT | Performed by: INTERNAL MEDICINE

## 2019-04-23 PROCEDURE — 85025 COMPLETE CBC W/AUTO DIFF WBC: CPT

## 2019-04-23 PROCEDURE — 1090F PRES/ABSN URINE INCON ASSESS: CPT | Performed by: INTERNAL MEDICINE

## 2019-04-23 PROCEDURE — 1123F ACP DISCUSS/DSCN MKR DOCD: CPT | Performed by: INTERNAL MEDICINE

## 2019-04-23 PROCEDURE — 4040F PNEUMOC VAC/ADMIN/RCVD: CPT | Performed by: INTERNAL MEDICINE

## 2019-04-23 PROCEDURE — 96372 THER/PROPH/DIAG INJ SC/IM: CPT

## 2019-04-23 PROCEDURE — G8417 CALC BMI ABV UP PARAM F/U: HCPCS | Performed by: INTERNAL MEDICINE

## 2019-04-23 PROCEDURE — G8427 DOCREV CUR MEDS BY ELIG CLIN: HCPCS | Performed by: INTERNAL MEDICINE

## 2019-04-23 PROCEDURE — 3017F COLORECTAL CA SCREEN DOC REV: CPT | Performed by: INTERNAL MEDICINE

## 2019-04-23 PROCEDURE — G8400 PT W/DXA NO RESULTS DOC: HCPCS | Performed by: INTERNAL MEDICINE

## 2019-04-23 PROCEDURE — 6360000002 HC RX W HCPCS: Performed by: INTERNAL MEDICINE

## 2019-04-23 PROCEDURE — 36415 COLL VENOUS BLD VENIPUNCTURE: CPT

## 2019-04-23 PROCEDURE — 1036F TOBACCO NON-USER: CPT | Performed by: INTERNAL MEDICINE

## 2019-04-23 PROCEDURE — G8598 ASA/ANTIPLAT THER USED: HCPCS | Performed by: INTERNAL MEDICINE

## 2019-04-23 RX ORDER — ACETAMINOPHEN 325 MG/1
325 TABLET ORAL
Status: CANCELLED | OUTPATIENT
Start: 2019-05-07

## 2019-04-23 RX ORDER — FERROUS SULFATE 325(65) MG
325 TABLET ORAL 2 TIMES DAILY
Qty: 60 TABLET | Refills: 3 | Status: ON HOLD | OUTPATIENT
Start: 2019-04-23 | End: 2019-05-30 | Stop reason: HOSPADM

## 2019-04-23 RX ORDER — CYANOCOBALAMIN 1000 UG/ML
1000 INJECTION INTRAMUSCULAR; SUBCUTANEOUS ONCE
Status: CANCELLED
Start: 2019-05-07

## 2019-04-23 RX ORDER — CYANOCOBALAMIN 1000 UG/ML
1000 INJECTION INTRAMUSCULAR; SUBCUTANEOUS ONCE
Status: COMPLETED
Start: 2019-04-23 | End: 2019-04-23

## 2019-04-23 RX ADMIN — CYANOCOBALAMIN 1000 MCG: 1000 INJECTION, SOLUTION INTRAMUSCULAR at 10:35

## 2019-04-23 RX ADMIN — DARBEPOETIN ALFA 200 MCG: 200 INJECTION, SOLUTION INTRAVENOUS; SUBCUTANEOUS at 10:34

## 2019-04-23 ASSESSMENT — PAIN SCALES - GENERAL: PAINLEVEL_OUTOF10: 10

## 2019-04-23 ASSESSMENT — PAIN DESCRIPTION - LOCATION: LOCATION: ARM;FOOT;LEG

## 2019-04-23 ASSESSMENT — PAIN DESCRIPTION - PAIN TYPE: TYPE: CHRONIC PAIN

## 2019-04-23 ASSESSMENT — PAIN DESCRIPTION - ORIENTATION: ORIENTATION: RIGHT;LEFT

## 2019-04-23 NOTE — PROGRESS NOTES
HGB 9.6 and aranesp indicated and given and also vitamin B12 injection given also. Pt tolerated both injections well and no bleeding at sites. Bandaids applied. Pt given AVS from  with next appts. No reactions or complaints. Pt discharged per ambulatory with walker with visitor and pt using O2 at 2L/nc during stay at [facility] with wall O2 and per home unit.

## 2019-04-23 NOTE — TELEPHONE ENCOUNTER
Continue Aranesp q 3 weeks for Hb below 10-PT WANTS APPTS 10AM OR LATER     RV 9 weeks- SCHEDULED FOR 6/25/19    Vitamin B12 today-DONE 4/23/19  aranesp today-DONE 4/23/19  Iron prescription-SENT TO PHARMACY

## 2019-04-23 NOTE — PROGRESS NOTES
Pt arrives per ambulatory with wheeled walker with visitor after md visit. Labs and orders reviewed.

## 2019-04-23 NOTE — PATIENT INSTRUCTIONS
Continue Aranesp q 3 weeks for Hb below 10  RV 9 weeks  Vitamin B12 today  aranesp today  Iron prescription

## 2019-04-29 NOTE — PROGRESS NOTES
[barium-containing compounds]; pcn [penicillins]; and bactrim [sulfamethoxazole-trimethoprim]. FAMILY HISTORY: Negative for any hematological or oncological conditions. SOCIAL HISTORY:  reports that she quit smoking about 48 years ago. Her smoking use included cigarettes. She has a 30.00 pack-year smoking history. She has never used smokeless tobacco. She reports that she drinks about 1.2 oz of alcohol per week. She reports that she does not use drugs. REVIEW OF SYSTEMS:     · General: + weakness + fatigue. No unanticipated weight loss or decreased appetite. No fever or chills. · Eyes: No blurred vision, eye pain or double vision. · Ears: No hearing problems or drainage. No tinnitus. · Throat: No sore throat, problems with swallowing or dysphagia. · Respiratory: No cough, sputum or hemoptysis. No shortness of breath. No pleuritic chest pain. · Cardiovascular: No chest pain, orthopnea or PND. No lower extremity edema. No palpitation. · Gastrointestinal: No problems with swallowing. No abdominal pain or bloating. No nausea or vomiting. No diarrhea or constipation. No GI bleeding. · Genitourinary: No dysuria, hematuria, frequency or urgency. · Musculoskeletal: ++ limitation of movement. ++ gait disturbance, using wheelchair. · Dermatologic: No skin rashes or pruritus. No skin lesions or discolorations. · Psychiatric: No depression, anxiety, or stress or signs of schizophrenia. No change in mood or affect. · Hematologic: No history of bleeding tendency. No bruises or ecchymosis. No history of clotting problems. · Infectious disease: No fever, chills or frequent infections. · Endocrine: No problems with opacity. No polydipsia or polyuria. No temperature intolerance. · Neurologic: No headaches or dizziness. No weakness or numbness of the extremities. No changes in balance, coordination,  memory, mentation, behavior. · Allergic/Immunologic: No nasal congestion or hives.  No repeated infections. PHYSICAL EXAM: poor performance with wheelchair use. The patient is not in acute distress. Vital signs: Blood pressure 131/60, pulse 60, temperature 98 °F (36.7 °C), temperature source Oral, resp. rate 18, weight 186 lb 12.8 oz (84.7 kg), last menstrual period 04/03/2004, not currently breastfeeding. HEENT:  Eyes are normal. Ears, nose and throat are normal.  Neck: Supple. No lymph node enlargement. No thyroid enlargement. Trachea is centrally located. Chest:  Clear to auscultation. No wheezes or crepitations. Heart: Regular sinus rhythm. Abdomen: Soft, nontender. No hepatosplenomegaly. No masses. Extremities:  With no edema. Lymph Nodes:  No cervical, axillary or inguinal lymph node enlargement. Neurologic:  Conscious and oriented. No focal neurological deficits. Psychosocial: No depression, anxiety or stress. Skin: No rashes, bruises or ecchymoses. Review of Diagnostic data:   Lab Results   Component Value Date    WBC 7.9 04/23/2019    HGB 9.6 (L) 04/23/2019    HCT 28.7 (L) 04/23/2019    MCV 96.6 04/23/2019     (L) 04/23/2019    LYMPHOPCT 16 (L) 04/23/2019    RBC 2.97 (L) 04/23/2019    MCH 32.3 04/23/2019    MCHC 33.4 04/23/2019    RDW 16.2 (H) 04/23/2019     Lab Results   Component Value Date    IRON 55 01/08/2019    TIBC 273 01/08/2019    FERRITIN 255 (H) 01/08/2019       Chemistry        Component Value Date/Time     (H) 04/09/2019 1131    K 4.2 04/09/2019 1131     04/09/2019 1131    CO2 29 04/09/2019 1131    BUN 38 (H) 04/09/2019 1131    CREATININE 1.77 (H) 04/09/2019 1131        Component Value Date/Time    CALCIUM 9.1 04/09/2019 1131    ALKPHOS 42 11/28/2018 0717    AST 14 11/28/2018 0717    ALT <5 (L) 11/28/2018 0717    BILITOT 0.16 (L) 11/28/2018 0717        Lab Results   Component Value Date    IRON 55 01/08/2019    TIBC 273 01/08/2019    FERRITIN 255 (H) 01/08/2019         IMPRESSION:   Previous labs with Macrocytic anemia.   Previous

## 2019-05-03 ENCOUNTER — HOSPITAL ENCOUNTER (OUTPATIENT)
Age: 74
Setting detail: SPECIMEN
Discharge: HOME | End: 2019-05-03
Payer: MEDICARE

## 2019-05-03 ENCOUNTER — OFFICE VISIT (OUTPATIENT)
Dept: FAMILY MEDICINE CLINIC | Age: 74
End: 2019-05-03
Payer: COMMERCIAL

## 2019-05-03 VITALS
RESPIRATION RATE: 16 BRPM | BODY MASS INDEX: 32.96 KG/M2 | WEIGHT: 186 LBS | OXYGEN SATURATION: 93 % | DIASTOLIC BLOOD PRESSURE: 63 MMHG | HEIGHT: 63 IN | SYSTOLIC BLOOD PRESSURE: 105 MMHG | HEART RATE: 64 BPM

## 2019-05-03 DIAGNOSIS — L81.9 DISCOLORATION OF SKIN OF LOWER LEG: ICD-10-CM

## 2019-05-03 DIAGNOSIS — B35.1 ONYCHOMYCOSIS: ICD-10-CM

## 2019-05-03 DIAGNOSIS — E83.42 HYPOMAGNESEMIA: ICD-10-CM

## 2019-05-03 DIAGNOSIS — F41.9 ANXIETY: ICD-10-CM

## 2019-05-03 DIAGNOSIS — B96.89 ACUTE BACTERIAL SINUSITIS: ICD-10-CM

## 2019-05-03 DIAGNOSIS — M79.605 BILATERAL LEG PAIN: Primary | ICD-10-CM

## 2019-05-03 DIAGNOSIS — J01.90 ACUTE BACTERIAL SINUSITIS: ICD-10-CM

## 2019-05-03 DIAGNOSIS — F51.01 PRIMARY INSOMNIA: ICD-10-CM

## 2019-05-03 DIAGNOSIS — R25.2 LEG CRAMPS: ICD-10-CM

## 2019-05-03 DIAGNOSIS — M79.604 BILATERAL LEG PAIN: ICD-10-CM

## 2019-05-03 DIAGNOSIS — I10 ESSENTIAL HYPERTENSION: ICD-10-CM

## 2019-05-03 DIAGNOSIS — M79.604 BILATERAL LEG PAIN: Primary | ICD-10-CM

## 2019-05-03 DIAGNOSIS — M79.605 BILATERAL LEG PAIN: ICD-10-CM

## 2019-05-03 LAB
ALBUMIN SERPL-MCNC: 4.5 G/DL (ref 3.5–5.2)
ANION GAP SERPL CALCULATED.3IONS-SCNC: 13 MMOL/L (ref 9–17)
BUN BLDV-MCNC: 35 MG/DL (ref 8–23)
BUN/CREAT BLD: ABNORMAL (ref 9–20)
CALCIUM SERPL-MCNC: 10 MG/DL (ref 8.6–10.4)
CHLORIDE BLD-SCNC: 97 MMOL/L (ref 98–107)
CO2: 33 MMOL/L (ref 20–31)
CREAT SERPL-MCNC: 1.91 MG/DL (ref 0.5–0.9)
GFR AFRICAN AMERICAN: 31 ML/MIN
GFR NON-AFRICAN AMERICAN: 26 ML/MIN
GFR SERPL CREATININE-BSD FRML MDRD: ABNORMAL ML/MIN/{1.73_M2}
GFR SERPL CREATININE-BSD FRML MDRD: ABNORMAL ML/MIN/{1.73_M2}
GLUCOSE BLD-MCNC: 90 MG/DL (ref 70–99)
MAGNESIUM: 1.9 MG/DL (ref 1.6–2.6)
PHOSPHORUS: 4.1 MG/DL (ref 2.6–4.5)
POTASSIUM SERPL-SCNC: 4.9 MMOL/L (ref 3.7–5.3)
SODIUM BLD-SCNC: 143 MMOL/L (ref 135–144)

## 2019-05-03 PROCEDURE — 1090F PRES/ABSN URINE INCON ASSESS: CPT | Performed by: FAMILY MEDICINE

## 2019-05-03 PROCEDURE — G8598 ASA/ANTIPLAT THER USED: HCPCS | Performed by: FAMILY MEDICINE

## 2019-05-03 PROCEDURE — G8427 DOCREV CUR MEDS BY ELIG CLIN: HCPCS | Performed by: FAMILY MEDICINE

## 2019-05-03 PROCEDURE — G8400 PT W/DXA NO RESULTS DOC: HCPCS | Performed by: FAMILY MEDICINE

## 2019-05-03 PROCEDURE — 1123F ACP DISCUSS/DSCN MKR DOCD: CPT | Performed by: FAMILY MEDICINE

## 2019-05-03 PROCEDURE — 4040F PNEUMOC VAC/ADMIN/RCVD: CPT | Performed by: FAMILY MEDICINE

## 2019-05-03 PROCEDURE — 1036F TOBACCO NON-USER: CPT | Performed by: FAMILY MEDICINE

## 2019-05-03 PROCEDURE — 99215 OFFICE O/P EST HI 40 MIN: CPT | Performed by: FAMILY MEDICINE

## 2019-05-03 PROCEDURE — 3017F COLORECTAL CA SCREEN DOC REV: CPT | Performed by: FAMILY MEDICINE

## 2019-05-03 PROCEDURE — G8417 CALC BMI ABV UP PARAM F/U: HCPCS | Performed by: FAMILY MEDICINE

## 2019-05-03 RX ORDER — POTASSIUM CHLORIDE 750 MG/1
20 CAPSULE, EXTENDED RELEASE ORAL DAILY
Qty: 60 CAPSULE | Refills: 3 | Status: ON HOLD | OUTPATIENT
Start: 2019-05-03 | End: 2019-05-30 | Stop reason: HOSPADM

## 2019-05-03 RX ORDER — ALPRAZOLAM 0.25 MG/1
0.25 TABLET ORAL NIGHTLY PRN
Qty: 30 TABLET | Refills: 0 | Status: ON HOLD | OUTPATIENT
Start: 2019-05-03 | End: 2019-05-30 | Stop reason: SDUPTHER

## 2019-05-03 NOTE — PROGRESS NOTES
[facility]  [address] [place] [id number]  Dept: [phone]      Doyle Myrick is a 72 y.o. female who presents today for follow up on her  medical conditions as noted below. Chief Complaint   Patient presents with    Leg Pain     c/o lower leg pain x's 2 weeks. slight reddness, slightly warm to touch. she is able to walk.         Patient Active Problem List:     Controlled type 2 diabetes mellitus with stage 3 chronic kidney disease, without long-term current use of insulin (HCC)     Hyperlipidemia     Essential hypertension     Chronic leg pain     Chronic atrial fibrillation (HCC)     Asthma     Gastroesophageal reflux disease without esophagitis     ECTOR on CPAP     Type 2 diabetes mellitus without complication, without long-term current use of insulin (HCC)     Spinal stenosis in cervical region     Hypomagnesemia     Hyperphosphaturia     Hypocalcemia     Parkinson's disease ([address]     Acute renal failure (HCC)     Acute cystitis with hematuria     Altered mental status     Iron deficiency anemia due to chronic blood loss     Morbid obesity with BMI of 40.0-44.9, adult (HCC)     Hyperplastic polyp of intestine     Diverticulosis     Panlobular emphysema (HCC)     Rapid atrial fibrillation (HCC)     CKD (chronic kidney disease) stage 3, GFR 30-59 ml/min (HCC)     KRISTIN (acute kidney injury) ([address]     Anemia in chronic kidney disease     Chronic respiratory failure with hypoxia and hypercapnia (HCC)     CKD stage 3 due to type 2 diabetes mellitus (HCC)     E. coli UTI (urinary tract infection)     Nephrolithiasis     Candidal intertrigo     Venous insufficiency     Malabsorption     Anemia of chronic renal failure, stage 4 (severe) ([facility])     Iron deficiency     Coronary atherosclerosis     COPD exacerbation (HCC)     Restless leg syndrome     SIRS (systemic inflammatory response syndrome) ([facility])     Nausea and vomiting in adult Bacterial UTI     Odynophagia     Esophageal dysphagia     Candida esophagitis (HCC)     Sepsis due to urinary tract infection (HCC)     Chronic respiratory failure (HCC)     Chronic diastolic congestive heart failure (HCC)     History of ESBL E. coli infection     Stage 4 chronic kidney disease (HCC)     Fever     Macrocytic anemia     Acute kidney injury ([facility]     Hypercalcemia     Monoclonal gammopathy of undetermined significance     Acute nonintractable headache     Anemia of chronic renal failure, stage 4 (severe) (HCC)     Past Medical History:   Diagnosis Date    Acute on chronic diastolic congestive heart failure ([facility]     Anesthesia complication     DIFFICULTY WAKING OP    Asthma     Atrial fibrillation ([address] 2013    Cataracts, bilateral     Cellulitis     BILAT LOWER LEGS-PT STATES IS IMPROVING    Cerebral artery occlusion with cerebral infarction [facility])     Last stroke was February 2017 w/no deficits-TOTAL OF 3    Chronic kidney disease 2013    NOT ON DIALYSIS YET    Constipation     CHRONIC    COPD (chronic obstructive pulmonary disease) ([facility] 2008    PT. SEES DR. BECK. Home O2 at 2-3L/NC 24/7.     Difficult intravenous access     VEINS ROLL    Diverticulosis     DM2 (diabetes mellitus, type 2) ([facility] 2008    Full dentures     FULL UPPER ONLY    GERD (gastroesophageal reflux disease) 2008    ON RX    Headache(784.0)     [place] (hard of hearing)     HAS HEARING AIDS BUT DOES NOT WEAR    Hyperlipidemia     Hyperplastic polyp of intestine     Hypertension 2008    Impaired ambulation     USES TRANFER CHAIR WALKER OR CANE    Kidney stone 2018    PRESENTLY-SMALL    Morbid obesity with BMI of 40.0-44.9, adult ([address] 12/30/2016    ECTOR on CPAP 2008    USES C-PAP NIGHTLY    Osteoarthritis     Parkinson disease ([facility] 2015    Restless leg syndrome 2013    MILD    Spinal stenosis in cervical region 6/2/2013    Type II or unspecified type diabetes mellitus without mention of complication, not stated as uncontrolled     Unspecified sleep apnea     cpap nightly    Urethral caruncle 3/8/2018    Wears glasses       Past Surgical History:   Procedure Laterality Date    APPENDECTOMY      BRONCHOSCOPY  06/05/2018    CERVICAL [address] SURGERY  2012    CERVICAL SPINE SURGERY  5/31/13    posterior c5-t1    COLONOSCOPY  2009    COLONOSCOPY  12/29/2016    incomplete was not cleaned out    COLONOSCOPY  12/30/2016    COLONOSCOPY  04/25/2017     SIGMOID COLON POLYPECTOMY:  HYPERPLASTIC POLYP ,   DIVERTICULOSIS    CYSTORRHAPHY  04/04/2018    EYE SURGERY Bilateral 2017    CATARACTS W/ IOL    HERNIA REPAIR  1999    VENTRAL    LAMINECTOMY  05/31/2013    Dr. Chelle Leahy DX W/CELL WASHG [address] [place] [address] N/A 6/5/2018    BRONCHOSCOPY performed by Yoshi Gilbert MD at [facility] FLX W/REMOVAL LESION BY HOT BX FORCEPS N/A 4/25/2017    COLONOSCOPY POLYPECTOMY HOT BIOPSY performed by Con Rose MD at [facility] N/A 4/4/2018    CYSTOSCOPY performed by Marie Sharpe MD at [address] [place] [address] ENDOSCOPY  12/28/2016    gastritis, esophagitis,     UPPER GASTROINTESTINAL ENDOSCOPY  08/29/2018    with biopsy    UPPER GASTROINTESTINAL ENDOSCOPY N/A 8/29/2018    EGD BIOPSY performed by Con Rose MD at [address] [place] [address] History   Problem Relation Age of Onset    Heart Failure Mother     Hypertension Mother     Heart Disease Mother     High Blood Pressure Mother        Current Outpatient Medications   Medication Sig Dispense Refill    ALPRAZolam (XANAX) 0.25 MG tablet Take 1 tablet by mouth nightly as needed for Sleep or Anxiety for up to 30 days.  30 tablet 0    potassium chloride (MICRO-K) 10 MEQ extended release capsule Take 2 capsules by mouth daily 60 capsule 3    ferrous sulfate 325 (65 Fe) MG tablet Take 1 tablet by mouth 2 times daily 60 tablet 3    atorvastatin (LIPITOR) 20 MG tablet TAKE 1 TABLET DAILY 90 tablet 2    metoprolol tartrate (LOPRESSOR) 25 MG tablet Take 1 tablet by mouth daily 90 tablet 3    carbidopa-levodopa (SINEMET CR)  MG per extended release tablet Take 1 tablet by mouth 4 times daily 360 tablet 3    rOPINIRole (REQUIP) 2 MG tablet Take 1 tablet by mouth 3 times daily 270 tablet 3    ipratropium-albuterol (DUONEB) 0.5-2.5 (3) MG/3ML SOLN nebulizer solution Inhale 3 mLs into the lungs three times daily 360 mL 3    ONETOUCH VERIO strip 1 each by In Vitro route daily As needed. 100 each 3    pantoprazole (PROTONIX) 40 MG tablet TAKE 1 TABLET TWICE A DAY BEFORE MEALS 180 tablet 3    linagliptin (TRADJENTA) 5 MG tablet Take 0.5 tablets by mouth daily 90 tablet 1    BREO ELLIPTA 100-25 MCG/INH AEPB inhaler USE 1 INHALATION DAILY 180 each 5    spironolactone (ALDACTONE) 25 MG tablet Take 1 tablet by mouth daily 90 tablet 3    ONE TOUCH ULTRA TEST strip USE 1 STRIP THREE TIMES A  each 3    cyanocobalamin 1000 MCG/ML injection Inject 1,000 mcg into the muscle See Admin Instructions Every 3 weeks      Cyanocobalamin (VITAMIN B-12) 2500 MCG SUBL Place 2,500 mcg under the tongue daily      conjugated estrogens (PREMARIN) 0.625 MG/GM vaginal cream Place 0.5 g vaginally daily as needed Place vaginally daily.  albuterol (ACCUNEB) 1.25 MG/3ML nebulizer solution Inhale 1 ampule into the lungs every 6 hours as needed for Wheezing or Shortness of Breath      ONEUCH DELICA LANCETS 28T MISC 1 Units by Does not apply route 2 times daily 100 each 3    nystatin (MYCOSTATIN) 287408 UNIT/GM powder Apply 3 times daily.  60 g 5    amiodarone (CORDARONE) 200 MG tablet Take 200 mg by mouth daily       melatonin 3 MG TABS tablet Take 1 tablet by mouth nightly as needed (sleep)  3    magnesium oxide (MAG-OX) 400 (241.3 Mg) MG TABS tablet Take 1 tablet by mouth 2 times daily 30 tablet     calcitRIOL (ROCALTROL) 0.25 MCG capsule TAKE 1 CAPSULE THREE TIMES A  capsule 2    posterior oropharyngeal erythema. Eyes: Conjunctivae and EOM are normal. Pupils are equal, round, and reactive to light. Neck: Normal range of motion. Neck supple. No thyromegaly present. Cardiovascular: Normal rate, regular rhythm and normal heart sounds. No murmur heard. Pulmonary/Chest: Effort normal and breath sounds normal. She has no wheezes. Shehas no rales. Abdominal: Soft. Bowel sounds are normal. She exhibits no distension and no mass. There is no tenderness. There is no rebound and no guarding. Genitourinary/Anorectal:deferred  Musculoskeletal: Normal range of motion. She exhibits no edema or tenderness. Erythema of the distal two thirds of the right lower leg with edema and extreme warmth  She also has a greatly deformed thickened irregular 5th   Lymphadenopathy: She has no cervical adenopathy. Neurological: She is alert and oriented to person, place, and time. She has normal reflexes. Skin: Skin is warm and dry. No rash noted. Psychiatric: She has a normal mood and affect. Her   behavior is normal.       Assessment:      1. Bilateral leg pain    2. Primary insomnia    3. Leg cramps    4. Discoloration of skin of lower leg    5. Anxiety    6. Acute bacterial sinusitis    7. Hypomagnesemia    8. Onychomycosis    9. Essential hypertension          Plan:      Call or return to clinic prn if these symptoms worsen or fail to improve as anticipated. I have reviewed the instructions with the patient, answering all questions to her satisfaction. No follow-ups on file.   Orders Placed This Encounter   Procedures    Renal Function Panel     Standing Status:   Future     Standing Expiration Date:   5/2/2020    Magnesium     Standing Status:   Future     Standing Expiration Date:   5/2/2020    JAKE - Lisset Meneses MD, Vascular Surgery, [facility]     Referral Priority:   Routine     Referral Type:   Eval and Treat     Referral Reason:   Specialty Services Required     Referred to Provider: Cee Núñez MD     Requested Specialty:   Vascular Surgery     Number of Visits Requested:   1    JAKE - Sree Sanon DPM, Podiatry, [facility] ([facility])     Referral Priority:   Routine     Referral Type:   Eval and Treat     Referral Reason:   Specialty Services Required     Referred to Provider:   Salma Morelos DPM     Requested Specialty:   Podiatry     Number of Visits Requested:   1    Ultrasound doppler arterial leg right     Standing Status:   Future     Standing Expiration Date:   7/2/2019    US DOPPLER VENOUS LEG RIGHT     Standing Status:   Future     Standing Expiration Date:   7/2/2019     Orders Placed This Encounter   Medications    ALPRAZolam (XANAX) 0.25 MG tablet     Sig: Take 1 tablet by mouth nightly as needed for Sleep or Anxiety for up to 30 days.      Dispense:  30 tablet     Refill:  0    potassium chloride (MICRO-K) 10 MEQ extended release capsule     Sig: Take 2 capsules by mouth daily     Dispense:  60 capsule     Refill:  3       Electronically signed by Karyn Szymanski DO on 5/3/2019 at 11:30 AM

## 2019-05-07 ENCOUNTER — HOSPITAL ENCOUNTER (OUTPATIENT)
Facility: MEDICAL CENTER | Age: 74
Discharge: HOME | End: 2019-05-07
Payer: COMMERCIAL

## 2019-05-07 DIAGNOSIS — E11.22 CKD STAGE 4 DUE TO TYPE 2 DIABETES MELLITUS (HCC): Chronic | ICD-10-CM

## 2019-05-07 DIAGNOSIS — N18.4 CKD STAGE 4 DUE TO TYPE 2 DIABETES MELLITUS (HCC): Chronic | ICD-10-CM

## 2019-05-07 DIAGNOSIS — N20.0 NEPHROLITHIASIS: Chronic | ICD-10-CM

## 2019-05-07 LAB
ABSOLUTE EOS #: 0.18 K/UL (ref 0–0.44)
ABSOLUTE IMMATURE GRANULOCYTE: 0.04 K/UL (ref 0–0.3)
ABSOLUTE LYMPH #: 1.2 K/UL (ref 1.1–3.7)
ABSOLUTE MONO #: 0.69 K/UL (ref 0.1–1.2)
BASOPHILS # BLD: 0 % (ref 0–2)
BASOPHILS ABSOLUTE: 0.03 K/UL (ref 0–0.2)
DIFFERENTIAL TYPE: ABNORMAL
EOSINOPHILS RELATIVE PERCENT: 2 % (ref 1–4)
HCT VFR BLD CALC: 35.4 % (ref 36.3–47.1)
HEMOGLOBIN: 10.7 G/DL (ref 11.9–15.1)
IMMATURE GRANULOCYTES: 0 %
LYMPHOCYTES # BLD: 13 % (ref 24–43)
MCH RBC QN AUTO: 31.9 PG (ref 25.2–33.5)
MCHC RBC AUTO-ENTMCNC: 30.2 G/DL (ref 28.4–34.8)
MCV RBC AUTO: 105.7 FL (ref 82.6–102.9)
MONOCYTES # BLD: 7 % (ref 3–12)
NRBC AUTOMATED: 0 PER 100 WBC
PDW BLD-RTO: 15.8 % (ref 11.8–14.4)
PLATELET # BLD: 119 K/UL (ref 138–453)
PLATELET ESTIMATE: ABNORMAL
PMV BLD AUTO: 10.7 FL (ref 8.1–13.5)
RBC # BLD: 3.35 M/UL (ref 3.95–5.11)
RBC # BLD: ABNORMAL 10*6/UL
SEG NEUTROPHILS: 78 % (ref 36–65)
SEGMENTED NEUTROPHILS ABSOLUTE COUNT: 7.21 K/UL (ref 1.5–8.1)
WBC # BLD: 9.4 K/UL (ref 3.5–11.3)
WBC # BLD: ABNORMAL 10*3/UL

## 2019-05-07 PROCEDURE — 36415 COLL VENOUS BLD VENIPUNCTURE: CPT

## 2019-05-07 PROCEDURE — 85025 COMPLETE CBC W/AUTO DIFF WBC: CPT

## 2019-05-13 ENCOUNTER — HOSPITAL ENCOUNTER (EMERGENCY)
Age: 74
Discharge: HOME | End: 2019-05-13
Attending: EMERGENCY MEDICINE
Payer: COMMERCIAL

## 2019-05-13 ENCOUNTER — APPOINTMENT (OUTPATIENT)
Dept: CT IMAGING | Age: 74
End: 2019-05-13
Payer: COMMERCIAL

## 2019-05-13 VITALS
SYSTOLIC BLOOD PRESSURE: 145 MMHG | HEART RATE: 76 BPM | WEIGHT: 186 LBS | OXYGEN SATURATION: 93 % | HEIGHT: 57 IN | DIASTOLIC BLOOD PRESSURE: 57 MMHG | TEMPERATURE: 97.8 F | BODY MASS INDEX: 40.13 KG/M2

## 2019-05-13 DIAGNOSIS — G89.29 CHRONIC LOW BACK PAIN, UNSPECIFIED BACK PAIN LATERALITY, WITH SCIATICA PRESENCE UNSPECIFIED: Primary | ICD-10-CM

## 2019-05-13 DIAGNOSIS — M54.5 CHRONIC LOW BACK PAIN, UNSPECIFIED BACK PAIN LATERALITY, WITH SCIATICA PRESENCE UNSPECIFIED: Primary | ICD-10-CM

## 2019-05-13 PROCEDURE — 72131 CT LUMBAR SPINE W/O DYE: CPT

## 2019-05-13 PROCEDURE — 6360000002 HC RX W HCPCS: Performed by: EMERGENCY MEDICINE

## 2019-05-13 PROCEDURE — 96372 THER/PROPH/DIAG INJ SC/IM: CPT

## 2019-05-13 PROCEDURE — 99284 EMERGENCY DEPT VISIT MOD MDM: CPT

## 2019-05-13 PROCEDURE — 93971 EXTREMITY STUDY: CPT

## 2019-05-13 RX ORDER — KETOROLAC TROMETHAMINE 30 MG/ML
30 INJECTION, SOLUTION INTRAMUSCULAR; INTRAVENOUS ONCE
Status: COMPLETED | OUTPATIENT
Start: 2019-05-13 | End: 2019-05-13

## 2019-05-13 RX ORDER — NAPROXEN 250 MG/1
250 TABLET ORAL 2 TIMES DAILY PRN
Qty: 20 TABLET | Refills: 0 | Status: ON HOLD | OUTPATIENT
Start: 2019-05-13 | End: 2019-05-30 | Stop reason: HOSPADM

## 2019-05-13 RX ORDER — ORPHENADRINE CITRATE 30 MG/ML
60 INJECTION INTRAMUSCULAR; INTRAVENOUS ONCE
Status: COMPLETED | OUTPATIENT
Start: 2019-05-13 | End: 2019-05-13

## 2019-05-13 RX ORDER — CYCLOBENZAPRINE HCL 10 MG
10 TABLET ORAL NIGHTLY PRN
Qty: 30 TABLET | Refills: 0 | Status: SHIPPED | OUTPATIENT
Start: 2019-05-13 | End: 2019-05-23

## 2019-05-13 RX ADMIN — ORPHENADRINE CITRATE 60 MG: 30 INJECTION INTRAMUSCULAR; INTRAVENOUS at 08:05

## 2019-05-13 RX ADMIN — KETOROLAC TROMETHAMINE 30 MG: 30 INJECTION, SOLUTION INTRAMUSCULAR at 08:06

## 2019-05-13 ASSESSMENT — ENCOUNTER SYMPTOMS
EYE PAIN: 0
NAUSEA: 0
VOMITING: 0
ABDOMINAL PAIN: 0
SORE THROAT: 0
BACK PAIN: 1
WHEEZING: 0
COUGH: 0

## 2019-05-13 ASSESSMENT — PAIN DESCRIPTION - DESCRIPTORS: DESCRIPTORS: CONSTANT;SHARP

## 2019-05-13 ASSESSMENT — PAIN DESCRIPTION - LOCATION: LOCATION: LEG

## 2019-05-13 ASSESSMENT — PAIN SCALES - GENERAL
PAINLEVEL_OUTOF10: 10

## 2019-05-13 ASSESSMENT — PAIN DESCRIPTION - ORIENTATION: ORIENTATION: RIGHT;LEFT

## 2019-05-13 ASSESSMENT — PAIN DESCRIPTION - PAIN TYPE: TYPE: ACUTE PAIN

## 2019-05-13 NOTE — ED NOTES
ASSESSMENT:    Presents to ED per pvt auto with  with c/o lt post hip/ trochanteric pain and pain, redness and swelling to rt lower leg and foot. Saw Dr Renda Lennox on  5/3/19 for same c/o's. To see Dr Marcello Cheung and to have a venous doppler of rt leg next week. No hx blood clots. No recent falls. Last fall was 6-8 months ago. Uses walker. On admission is alert and pleasant. Unsteady on feet which is her norm.      Ruben Kamara RN  05/13/19 [id number]

## 2019-05-13 NOTE — ED PROVIDER NOTES
EMERGENCY DEPARTMENT ENCOUNTER    Pt Name: Evelyn Lacey  MRN: [id number]  [place] 1945  Date of evaluation: 5/13/19  CHIEF COMPLAINT       Chief Complaint   Patient presents with    Leg Pain     bilateral    Abdominal Pain     right side upper abd/rib area     HISTORY OF PRESENT ILLNESS   HPI     21-year-old female history of chronic back pain presents to the ED for evaluation of worsening flare for the past 2 days. Patient with no recent injury or falls. Patient described a sharp shooting pain in her lower back especially over to the left low side radiating to the bilateral ankle. Patient also has been experiencing some muscle spasm as well. Patient's  states that he's been given her a prescription potassium and magnesium which have also help with her spasm. Patient was seen by her PCP Dr. Sayra Valladares last week who has scheduled some imaging the patient has not followed up yet. She denies numbness and tingling. Patient with no dysuria hematuria or flank pain. Does not have abdominal pain nausea vomiting    REVIEW OF SYSTEMS     Review of Systems   Constitutional: Negative for chills and fever. HENT: Negative for nosebleeds and sore throat. Eyes: Negative for pain and visual disturbance. Respiratory: Negative for cough and wheezing. Gastrointestinal: Negative for abdominal pain, nausea and vomiting. Genitourinary: Negative for flank pain, hematuria and pelvic pain. Musculoskeletal: Positive for back pain and gait problem. Skin: Negative for rash and wound. Neurological: Positive for weakness. Negative for light-headedness, numbness and headaches.      PASTMEDICAL HISTORY     Past Medical History:   Diagnosis Date    Acute on chronic diastolic congestive heart failure (HCC)     Anesthesia complication     DIFFICULTY WAKING OP    Asthma     Atrial fibrillation ([facility] 2013    Cataracts, bilateral     Cellulitis     BILAT LOWER LEGS-PT STATES IS IMPROVING    Cerebral artery occlusion with cerebral infarction [facility])     Last stroke was February 2017 w/no deficits-TOTAL OF 3    Chronic kidney disease 2013    NOT ON DIALYSIS YET    Constipation     CHRONIC    COPD (chronic obstructive pulmonary disease) ([facility] 2008    PT. SEES DR. BECK. Home O2 at 2-3L/NC 24/7.     Difficult intravenous access     VEINS ROLL    Diverticulosis     DM2 (diabetes mellitus, type 2) ([facility] 2008    Full dentures     FULL UPPER ONLY    GERD (gastroesophageal reflux disease) 2008    ON RX    Headache(784.0)     [place] (hard of hearing)     HAS HEARING AIDS BUT DOES NOT WEAR    Hyperlipidemia     Hyperplastic polyp of intestine     Hypertension 2008    Impaired ambulation     USES TRANFER CHAIR WALKER OR CANE    Kidney stone 2018    PRESENTLY-SMALL    Morbid obesity with BMI of 40.0-44.9, adult ([facility] 12/30/2016    ECTOR on CPAP 2008    USES C-PAP NIGHTLY    Osteoarthritis     Parkinson disease ([facility] 2015    Restless leg syndrome 2013    MILD    Spinal stenosis in cervical region 6/2/2013    Type II or unspecified type diabetes mellitus without mention of complication, not stated as uncontrolled     Unspecified sleep apnea     cpap nightly    Urethral caruncle 3/8/2018    Wears glasses      SURGICAL HISTORY       Past Surgical History:   Procedure Laterality Date    APPENDECTOMY      BRONCHOSCOPY  06/05/2018    CERVICAL [address] SURGERY  2012    CERVICAL SPINE SURGERY  5/31/13    posterior c5-t1    COLONOSCOPY  2009    COLONOSCOPY  12/29/2016    incomplete was not cleaned out    COLONOSCOPY  12/30/2016    COLONOSCOPY  04/25/2017     SIGMOID COLON POLYPECTOMY:  HYPERPLASTIC POLYP ,   DIVERTICULOSIS    CYSTORRHAPHY  04/04/2018    EYE SURGERY Bilateral 2017    CATARACTS W/ IOL    HERNIA REPAIR  1999    VENTRAL    LAMINECTOMY  05/31/2013    Dr. Rojelio Lipscomb    [address] DX W/CELL WASHG [facility] N/A 6/5/2018    BRONCHOSCOPY performed by Tj Garcia MD at [place] [address] FLX W/REMOVAL LESION BY HOT BX FORCEPS N/A 4/25/2017    COLONOSCOPY POLYPECTOMY HOT BIOPSY performed by Saran Jimenez MD at [facility] N/A 4/4/2018    CYSTOSCOPY performed by Angelica Pham MD at [address] [place] [address] ENDOSCOPY  12/28/2016    gastritis, esophagitis,     UPPER GASTROINTESTINAL ENDOSCOPY  08/29/2018    with biopsy    UPPER GASTROINTESTINAL ENDOSCOPY N/A 8/29/2018    EGD BIOPSY performed by Saran Jimenez MD at [place] [address]       Previous Medications    ALBUTEROL (ACCUNEB) 1.25 MG/3ML NEBULIZER SOLUTION    Inhale 1 ampule into the lungs every 6 hours as needed for Wheezing or Shortness of Breath    ALPRAZOLAM (XANAX) 0.25 MG TABLET    Take 1 tablet by mouth nightly as needed for Sleep or Anxiety for up to 30 days. AMIODARONE (CORDARONE) 200 MG TABLET    Take 200 mg by mouth daily     ASPIRIN 81 MG TABLET    Take 81 mg by mouth daily     ATORVASTATIN (LIPITOR) 20 MG TABLET    TAKE 1 TABLET DAILY    BREO ELLIPTA 100-25 MCG/INH AEPB INHALER    USE 1 INHALATION DAILY    CALCITRIOL (ROCALTROL) 0.25 MCG CAPSULE    TAKE 1 CAPSULE THREE TIMES A DAY    CALCIUM CITRATE PO    Take 500 mg by mouth 3 times daily     CARBIDOPA-LEVODOPA (SINEMET CR)  MG PER EXTENDED RELEASE TABLET    Take 1 tablet by mouth 4 times daily    CONJUGATED ESTROGENS (PREMARIN) 0.625 MG/GM VAGINAL CREAM    Place 0.5 g vaginally daily as needed Place vaginally daily.     CYANOCOBALAMIN (VITAMIN B-12) 2500 MCG SUBL    Place 2,500 mcg under the tongue daily    CYANOCOBALAMIN 1000 MCG/ML INJECTION    Inject 1,000 mcg into the muscle See Admin Instructions Every 3 weeks    FERROUS SULFATE 325 (65 FE) MG EC TABLET    Take 1 tablet by mouth 2 times daily (with meals)    FERROUS SULFATE 325 (65 FE) MG TABLET    Take 1 tablet by mouth 2 times daily    IPRATROPIUM-ALBUTEROL (DUONEB) 0.5-2.5 (3) MG/3ML SOLN NEBULIZER SOLUTION    Inhale 3 mLs into the lungs three times daily    LINAGLIPTIN (TRADJENTA) 5 MG TABLET    Take 0.5 tablets by mouth daily    MAGNESIUM OXIDE (MAG-OX) 400 (241.3 MG) MG TABS TABLET    Take 1 tablet by mouth 2 times daily    MELATONIN 3 MG TABS TABLET    Take 1 tablet by mouth nightly as needed (sleep)    METOPROLOL TARTRATE (LOPRESSOR) 25 MG TABLET    Take 1 tablet by mouth daily    NYSTATIN (MYCOSTATIN) 519074 UNIT/GM POWDER    Apply 3 times daily. ONE TOUCH ULTRA TEST STRIP    USE 1 STRIP THREE TIMES A DAY    ONETOUCH DELICA LANCETS 30N MISC    1 Units by Does not apply route 2 times daily    ONETOUCH VERIO STRIP    1 each by In Vitro route daily As needed. OXYGEN CONCENTRATOR    Dx: Pneumonia, use as directed. PANTOPRAZOLE (PROTONIX) 40 MG TABLET    TAKE 1 TABLET TWICE A DAY BEFORE MEALS    POTASSIUM CHLORIDE (MICRO-K) 10 MEQ EXTENDED RELEASE CAPSULE    Take 2 capsules by mouth daily    RASAGILINE MESYLATE 1 MG TABS    Take 1 tablet by mouth daily    ROPINIROLE (REQUIP) 2 MG TABLET    Take 1 tablet by mouth 3 times daily    SENNA (SENOKOT) 8.6 MG TABLET    Take 2 tablets by mouth nightly     SPIRONOLACTONE (ALDACTONE) 25 MG TABLET    Take 1 tablet by mouth daily    VITAMIN D (CHOLECALCIFEROL) 5000 UNITS CAPS CAPSULE    Take 5,000 Units by mouth daily     ALLERGIES     is allergic to dye [barium-containing compounds]; pcn [penicillins]; and bactrim [sulfamethoxazole-trimethoprim]. FAMILY HISTORY     indicated that her mother is . She indicated that her father is . She indicated that her maternal grandmother is . She indicated that her maternal grandfather is . She indicated that her paternal grandmother is . She indicated that her paternal grandfather is .      SOCIAL HISTORY       Social History     Tobacco Use    Smoking status: Former Smoker     Packs/day: 2.00     Years: 15.00     Pack years: 30.00     Types: Cigarettes     Last attempt to quit: 10/31/1970     Years since quittin.5    Smokeless tobacco: Never Used   Substance Use Topics    Alcohol use: Yes     Alcohol/week: 1.2 oz     Types: 2 Shots of liquor per week     Comment: 4 DRINKS A YEAR    Drug use: No     Comment: Pt denies use. PHYSICAL EXAM     INITIAL VITALS:   Vitals:    19 0752   BP: (!) 145/57   Pulse: 76   Temp: 97.8 °F (36.6 °C)   TempSrc: Oral   SpO2: 93%   Weight: 186 lb (84.4 kg)   Height: 4' 9\" (1.448 m)       Physical Exam   Constitutional: She is oriented to person, place, and time. She appears well-developed and well-nourished. HENT:   Head: Normocephalic and atraumatic. Eyes: Conjunctivae and EOM are normal.   Neck: Normal range of motion. Neck supple. Cardiovascular: Normal rate and regular rhythm. Pulmonary/Chest: Effort normal and breath sounds normal.   Abdominal: Soft. She exhibits no mass. There is no tenderness. Musculoskeletal: Normal range of motion. She exhibits no edema or deformity. Left hip: She exhibits tenderness and bony tenderness. Right lower leg: She exhibits tenderness and swelling. She exhibits no edema and no deformity. Legs:  Neurological: She is alert and oriented to person, place, and time. Skin: Skin is warm and dry. MEDICAL DECISION MAKING:       ED Course as of May 13 111   Mon May 13, 2019   1115 Patient felt much better upon discharge. Patient will be discharged with Flexeril for nighttime and also naproxen. [JA]      ED Course User Index  [JA] Rosario Shields MD     CRITICAL CARE:       PROCEDURES:    Procedures    DIAGNOSTIC RESULTS   EKG:All EKG's are interpreted by the Emergency Department Physician who either signs or Co-signs this chart in the absence of a cardiologist.        RADIOLOGY:All plain film, CT, MRI, and formal ultrasound images (except ED bedside ultrasound) are read by the radiologist, see reports below, unless otherwisenoted in MDM or here.   CT Lumbar Spine WO Contrast   Final Result   No

## 2019-05-16 ENCOUNTER — TELEPHONE (OUTPATIENT)
Dept: FAMILY MEDICINE CLINIC | Age: 74
End: 2019-05-16

## 2019-05-16 NOTE — TELEPHONE ENCOUNTER
Pt daughter called for vascular results for her mother.  Let her know that results came back normal.

## 2019-05-18 ENCOUNTER — HOSPITAL ENCOUNTER (EMERGENCY)
Age: 74
Discharge: HOME | End: 2019-05-18
Attending: EMERGENCY MEDICINE
Payer: COMMERCIAL

## 2019-05-18 VITALS
HEART RATE: 69 BPM | TEMPERATURE: 98 F | HEIGHT: 61 IN | DIASTOLIC BLOOD PRESSURE: 95 MMHG | RESPIRATION RATE: 20 BRPM | SYSTOLIC BLOOD PRESSURE: 140 MMHG | OXYGEN SATURATION: 98 % | WEIGHT: 186 LBS | BODY MASS INDEX: 35.12 KG/M2

## 2019-05-18 DIAGNOSIS — M79.604 RIGHT LEG PAIN: Primary | ICD-10-CM

## 2019-05-18 PROCEDURE — 6370000000 HC RX 637 (ALT 250 FOR IP): Performed by: NURSE PRACTITIONER

## 2019-05-18 PROCEDURE — 99283 EMERGENCY DEPT VISIT LOW MDM: CPT

## 2019-05-18 RX ORDER — HYDROCODONE BITARTRATE AND ACETAMINOPHEN 5; 325 MG/1; MG/1
1 TABLET ORAL 2 TIMES DAILY PRN
Qty: 8 TABLET | Refills: 0 | Status: SHIPPED | OUTPATIENT
Start: 2019-05-18 | End: 2019-05-22

## 2019-05-18 RX ORDER — HYDROCODONE BITARTRATE AND ACETAMINOPHEN 5; 325 MG/1; MG/1
1 TABLET ORAL ONCE
Status: COMPLETED | OUTPATIENT
Start: 2019-05-18 | End: 2019-05-18

## 2019-05-18 RX ADMIN — HYDROCODONE BITARTRATE AND ACETAMINOPHEN 1 TABLET: 5; 325 TABLET ORAL at 15:29

## 2019-05-18 ASSESSMENT — PAIN DESCRIPTION - LOCATION: LOCATION: LEG

## 2019-05-18 ASSESSMENT — PAIN SCALES - GENERAL: PAINLEVEL_OUTOF10: 10

## 2019-05-18 ASSESSMENT — PAIN DESCRIPTION - FREQUENCY: FREQUENCY: CONTINUOUS

## 2019-05-18 ASSESSMENT — PAIN DESCRIPTION - DESCRIPTORS: DESCRIPTORS: SHARP

## 2019-05-18 ASSESSMENT — PAIN DESCRIPTION - ORIENTATION: ORIENTATION: RIGHT

## 2019-05-18 NOTE — ED NOTES
Patient presents to ED with reports of right sided leg pain starting in her groin and going down her leg. Patient states this has been ongoing for a few weeks without no relief. Patient states she was seen here for the same concern over a week ago and prescribed naproxen in which her PCP discontinued D/T decreased kidney function. Patient states when followed up with her PCP she referred her to a vascular specialist. Patient states that appointment is not until this Wednesday. Patient denies any new injuries/falls. States she needs something to manage her pain. Respirations even, non labored. Skin warm, dry, and intact. Pedal pulses present bilaterally.       Sg Hurd RN  05/18/19 7711

## 2019-05-18 NOTE — ED PROVIDER NOTES
Attending Supervisory Note/Shared Visit   I have personally performed a face to face diagnostic evaluation on this patient. I have reviewed the mid-levels findings and agree.         (Please note that portions of this note were completed with a voice recognition program.  Efforts were made to edit the dictations but occasionally words are mis-transcribed.)    Pao Shukla MD  Attending Emergency Physician      Pao Shukla MD  05/18/19 3157

## 2019-05-18 NOTE — ED PROVIDER NOTES
(diabetes mellitus, type 2) ([facility] [address] 2008    Full dentures     FULL UPPER ONLY    GERD (gastroesophageal reflux disease) 2008    ON RX    Headache(784.0)     Solomon (hard of hearing)     HAS HEARING AIDS BUT DOES NOT WEAR    Hyperlipidemia     Hyperplastic polyp of intestine     Hypertension 2008    Impaired ambulation     USES TRANFER CHAIR WALKER OR CANE    Kidney stone 2018    PRESENTLY-SMALL    Morbid obesity with BMI of 40.0-44.9, adult ([facility] [address] 12/30/2016    ECTOR on CPAP 2008    USES C-PAP NIGHTLY    Osteoarthritis     Parkinson disease ([facility] [address] 2015    Restless leg syndrome 2013    MILD    Spinal stenosis in cervical region 6/2/2013    Type II or unspecified type diabetes mellitus without mention of complication, not stated as uncontrolled     Unspecified sleep apnea     cpap nightly    Urethral caruncle 3/8/2018    Wears glasses          SURGICAL HISTORY       Past Surgical History:   Procedure Laterality Date    APPENDECTOMY      BRONCHOSCOPY  06/05/2018   [facility] SURGERY  2012    CERVICAL SPINE SURGERY  5/31/13    posterior c5-t1    COLONOSCOPY  2009    COLONOSCOPY  12/29/2016    incomplete was not cleaned out    COLONOSCOPY  12/30/2016    COLONOSCOPY  04/25/2017     SIGMOID COLON POLYPECTOMY:  HYPERPLASTIC POLYP ,   DIVERTICULOSIS    CYSTORRHAPHY  04/04/2018    EYE SURGERY Bilateral 2017    CATARACTS W/ IOL    HERNIA REPAIR  1999    VENTRAL    LAMINECTOMY  05/31/2013    Dr. Chely Crump DX W/CELL WASHG 100 [facility] N/A 6/5/2018    BRONCHOSCOPY performed by Maya Phan MD at [address] N/A 4/25/2017    COLONOSCOPY POLYPECTOMY HOT BIOPSY performed by Mireya Campbell MD at [facility] N/A 4/4/2018    CYSTOSCOPY performed by Mick Banks MD at [facility]  12/28/2016    gastritis, esophagitis,     UPPER GASTROINTESTINAL ENDOSCOPY  08/29/2018    with biopsy    UPPER GASTROINTESTINAL ENDOSCOPY N/A 8/29/2018    EGD BIOPSY performed by Lesly Bryan MD at [address]     Discharge Medication List as of 5/18/2019  3:28 PM      CONTINUE these medications which have NOT CHANGED    Details   cyclobenzaprine (FLEXERIL) 10 MG tablet Take 1 tablet by mouth nightly as needed for Muscle spasms, Disp-30 tablet, R-0Print      naproxen (NAPROSYN) 250 MG tablet Take 1 tablet by mouth 2 times daily as needed for Pain, Disp-20 tablet, R-0Print      ALPRAZolam (XANAX) 0.25 MG tablet Take 1 tablet by mouth nightly as needed for Sleep or Anxiety for up to 30 days. , Disp-30 tablet, R-0Normal      potassium chloride (MICRO-K) 10 MEQ extended release capsule Take 2 capsules by mouth daily, Disp-60 capsule, R-3Normal      atorvastatin (LIPITOR) 20 MG tablet TAKE 1 TABLET DAILY, Disp-90 tablet, R-2Normal      metoprolol tartrate (LOPRESSOR) 25 MG tablet Take 1 tablet by mouth daily, Disp-90 tablet, R-3Normal      carbidopa-levodopa (SINEMET CR)  MG per extended release tablet Take 1 tablet by mouth 4 times daily, Disp-360 tablet, R-3Normal      rOPINIRole (REQUIP) 2 MG tablet Take 1 tablet by mouth 3 times daily, Disp-270 tablet, R-3Normal      ipratropium-albuterol (DUONEB) 0.5-2.5 (3) MG/3ML SOLN nebulizer solution Inhale 3 mLs into the lungs three times daily, Disp-360 mL, R-3Print      pantoprazole (PROTONIX) 40 MG tablet TAKE 1 TABLET TWICE A DAY BEFORE MEALS, Disp-180 tablet, R-3Normal      linagliptin (TRADJENTA) 5 MG tablet Take 0.5 tablets by mouth daily, Disp-90 tablet, R-1Normal      BREO ELLIPTA 100-25 MCG/INH AEPB inhaler USE 1 INHALATION DAILY, Disp-180 each, R-5, DAWNormal      spironolactone (ALDACTONE) 25 MG tablet Take 1 tablet by mouth daily, Disp-90 tablet, R-3Normal      cyanocobalamin 1000 MCG/ML injection Inject 1,000 mcg into the muscle See Admin Instructions Every 3 weeksHistorical Med Cyanocobalamin (VITAMIN B-12) 2500 MCG SUBL Place 2,500 mcg under the tongue dailyHistorical Med      albuterol (ACCUNEB) 1.25 MG/3ML nebulizer solution Inhale 1 ampule into the lungs every 6 hours as needed for Wheezing or Shortness of BreathHistorical Med      nystatin (MYCOSTATIN) 375686 UNIT/GM powder Apply 3 times daily. , Disp-60 g, R-5, Normal      amiodarone (CORDARONE) 200 MG tablet Take 200 mg by mouth daily Historical Med      melatonin 3 MG TABS tablet Take 1 tablet by mouth nightly as needed (sleep), R-3DC to SNF      magnesium oxide (MAG-OX) 400 (241.3 Mg) MG TABS tablet Take 1 tablet by mouth 2 times daily, Disp-30 tabletDC to SNF      calcitRIOL (ROCALTROL) 0.25 MCG capsule TAKE 1 CAPSULE THREE TIMES A DAY, Disp-270 capsule, R-2Normal      CALCIUM CITRATE PO Take 500 mg by mouth 3 times daily Historical Med      senna (SENOKOT) 8.6 MG tablet Take 2 tablets by mouth nightly Historical Med      aspirin 81 MG tablet Take 81 mg by mouth daily Historical Med      ferrous sulfate 325 (65 Fe) MG EC tablet Take 1 tablet by mouth 2 times daily (with meals), Disp-90 tablet, R-3DC to SNF      vitamin D (CHOLECALCIFEROL) 5000 units CAPS capsule Take 5,000 Units by mouth dailyHistorical Med      rasagiline mesylate 1 MG TABS Take 1 tablet by mouth dailyHistorical Med      Oxygen Concentrator Starting 2/10/2017, Until Discontinued, Disp-1 each, R-0, PrintDx: Pneumonia, use as directed. ferrous sulfate 325 (65 Fe) MG tablet Take 1 tablet by mouth 2 times daily, Disp-60 tablet, R-3Normal      !! ONETOUCH VERIO strip 1 each by In Vitro route daily As needed. , Disp-100 each, R-3, DAWNormal      !! ONE TOUCH ULTRA TEST strip USE 1 STRIP THREE TIMES A DAY, Disp-300 each, R-3Normal      conjugated estrogens (PREMARIN) 0.625 MG/GM vaginal cream Place 0.5 g vaginally daily as needed Place vaginally daily. , Vaginal, DAILY PRN, Historical Med      ONETOUCH DELICA LANCETS 81I MISC 1 Units by Does not apply route 2 times daily, Disp-100 each, R-3Normal       !! - Potential duplicate medications found. Please discuss with provider. ALLERGIES     Dye [barium-containing compounds]; Pcn [penicillins]; and Bactrim [sulfamethoxazole-trimethoprim]    FAMILY HISTORY       Family History   Problem Relation Age of Onset    Heart Failure Mother     Hypertension Mother     Heart Disease Mother     High Blood Pressure Mother           SOCIAL HISTORY       Social History     Socioeconomic History    Marital status:      Spouse name: None    Number of children: None    Years of education: None    Highest education level: None   Occupational History    None   Social Needs    Financial resource strain: None    Food insecurity:     Worry: None     Inability: None    Transportation needs:     Medical: None     Non-medical: None   Tobacco Use    Smoking status: Former Smoker     Packs/day: 2.00     Years: 15.00     Pack years: 30.00     Types: Cigarettes     Last attempt to quit: 10/31/1970     Years since quittin.5    Smokeless tobacco: Never Used   Substance and Sexual Activity    Alcohol use: Yes     Alcohol/week: 1.2 oz     Types: 2 Shots of liquor per week     Comment: 4 DRINKS A YEAR    Drug use: No     Comment: Pt denies use.      Sexual activity: None     Comment: not for the past year d/t illness, denies  concerns/pain   Lifestyle    Physical activity:     Days per week: None     Minutes per session: None    Stress: None   Relationships    Social connections:     Talks on phone: None     Gets together: None     Attends Anabaptism service: None     Active member of club or organization: None     Attends meetings of clubs or organizations: None     Relationship status: None    Intimate partner violence:     Fear of current or ex partner: None     Emotionally abused: None     Physically abused: None     Forced sexual activity: None   Other Topics Concern    None   Social History Narrative    None REVIEW OF SYSTEMS    (2-9 systems for level 4, 10 or more for level 5)     Review of Systems   Musculoskeletal: Positive for arthralgias and gait problem. All other systems reviewed and are negative. Except as noted above the remainder of the review of systems was reviewed and negative. PHYSICAL EXAM    (up to 7 for level 4, 8 or more for level 5)     ED Triage Vitals [05/18/19 1444]   BP Temp Temp Source Pulse Resp SpO2 Height Weight   (!) 140/95 98 °F (36.7 °C) Oral 69 20 98 % 5' 1\" (1.549 m) 186 lb (84.4 kg)       Physical Exam   Constitutional: She is oriented to person, place, and time. She appears well-developed and well-nourished. HENT:   Head: Normocephalic and atraumatic. Right Ear: Hearing and external ear normal.   Left Ear: Hearing and external ear normal.   Nose: Nose normal.   Mouth/Throat: Uvula is midline, oropharynx is clear and moist and mucous membranes are normal.   Eyes: Pupils are equal, round, and reactive to light. Conjunctivae, EOM and lids are normal.   Neck: Normal range of motion and full passive range of motion without pain. Neck supple. Cardiovascular: Normal rate, regular rhythm, intact distal pulses and normal pulses. Pulses:       Dorsalis pedis pulses are 2+ on the right side, and 2+ on the left side. Posterior tibial pulses are 2+ on the right side, and 2+ on the left side. Pulmonary/Chest: Effort normal. No respiratory distress. Abdominal: Soft. Normal appearance and bowel sounds are normal. There is no tenderness. Musculoskeletal: Normal range of motion. Right upper leg: She exhibits tenderness. She exhibits no swelling. Legs:  Neurological: She is alert and oriented to person, place, and time. She has normal strength. No cranial nerve deficit or sensory deficit. GCS eye subscore is 4. GCS verbal subscore is 5. GCS motor subscore is 6. Skin: Skin is warm, dry and intact. Capillary refill takes less than 2 seconds.  No ecchymosis and no rash noted. No erythema. Psychiatric: She has a normal mood and affect. Her behavior is normal. Judgment and thought content normal.         DIAGNOSTIC RESULTS     EKG:All EKG's are interpreted by the Emergency Department Physician who either signs or Co-signs this chart in the absence of a cardiologist.        RADIOLOGY:   Non-plain film images such as CT, Ultrasound and MRI are read by theradiologist. Plain radiographic images are visualized and preliminarily interpreted by the emergency physician with the below findings:    Ct Lumbar Spine Wo Contrast    Result Date: 5/13/2019  EXAMINATION: CT OF THE LUMBAR SPINE WITHOUT CONTRAST  5/13/2019 TECHNIQUE: CT of the lumbar spine was performed without the administration of intravenous contrast. Multiplanar reformatted images are provided for review. Dose modulation, iterative reconstruction, and/or weight based adjustment of the mA/kV was utilized to reduce the radiation dose to as low as reasonably achievable. COMPARISON: September 10, 2012 HISTORY: ORDERING SYSTEM PROVIDED HISTORY: back pain TECHNOLOGIST PROVIDED HISTORY: back pain FINDINGS: BONES/ALIGNMENT: Osteopenia with diffuse areas of cortical lucency throughout the visualized osseous structures. This is a similar appearance, although more pronounced compared to the 2012 exam.  The vertebral body heights are maintained. No listhesis. DEGENERATIVE CHANGES: Multilevel degenerative disc disease and advanced lower lumbar facet arthropathy is again noted. Disc osteophyte complex at L2-3 is more prominent since 2012 and encroaches on the central canal.  Mild disc disease at L1-appears unchanged. Disc disease at L5-S1 appears without significant change. SOFT TISSUES/RETROPERITONEUM: No perispinal hematoma identified. Calcified atheromatous plaque is noted. Bilateral nephrolithiasis, right greater than left. Fibroid uterus. No acute osseous abnormality identified.  Diffuse lucencies throughout the visualized osseous structures, more pronounced since 2012, favoring aggressive appearance of osteoporosis. Metastatic disease or myeloma is considered less likely. Multilevel degenerative disc disease, as above. Bilateral nephrolithiasis. Interpretation per the Radiologist below, if available at the time of this note:    No orders to display         EDBEDSIDE ULTRASOUND:   Performed by [facility] - [facility]ardo    LABS:  [unfilled]    All other labs were within normal range or not returned as of this dictation. EMERGENCY DEPARTMENT COURSE andDIFFERENTIAL DIAGNOSIS/MDM:   Patient was evaluated in conjunction with attending physician. There is no swelling, erythema or ecchymosis noted to the right leg. Mild tenderness to the right groin. Pain appears to be musculoskeletal.  No neurological deficits. No abdominal pain. She denies urinary symptoms. OARRS reviewed. She'll be discharged on [place] for the next couple days and she was instructed to follow-up with her doctor. She also states she has been undergoing physical therapy. Vitals:    Vitals:    05/18/19 1444   BP: (!) 140/95   Pulse: 69   Resp: 20   Temp: 98 °F (36.7 °C)   TempSrc: Oral   SpO2: 98%   Weight: 186 lb (84.4 kg)   Height: 5' 1\" (1.549 m)         CONSULTS:  None    PROCEDURES:  Procedures    FINAL IMPRESSION      1. Right leg pain          DISPOSITION/PLAN   DISPOSITION Decision To Discharge 05/18/2019 03:27:08 PM      PATIENT REFERRED TO:   Adalgisa Adams DO  [address] [place] [address] 22.  [phone]    Go in 2 days      [facility] ED  [address] [facility]  [phone]    As needed      DISCHARGE MEDICATIONS:     Discharge Medication List as of 5/18/2019  3:28 PM      START taking these medications    Details   HYDROcodone-acetaminophen (NORCO) 5-325 MG per tablet Take 1 tablet by mouth 2 times daily as needed for Pain for up to 4 days. , Disp-8 tablet, R-0Print

## 2019-05-20 ENCOUNTER — HOSPITAL ENCOUNTER (EMERGENCY)
Age: 74
Discharge: HOME | End: 2019-05-20
Attending: EMERGENCY MEDICINE
Payer: COMMERCIAL

## 2019-05-20 VITALS
TEMPERATURE: 98.6 F | SYSTOLIC BLOOD PRESSURE: 149 MMHG | OXYGEN SATURATION: 100 % | HEART RATE: 77 BPM | BODY MASS INDEX: 34.55 KG/M2 | WEIGHT: 183 LBS | RESPIRATION RATE: 22 BRPM | HEIGHT: 61 IN | DIASTOLIC BLOOD PRESSURE: 36 MMHG

## 2019-05-20 DIAGNOSIS — M79.604 CHRONIC PAIN OF BOTH LOWER EXTREMITIES: Primary | ICD-10-CM

## 2019-05-20 DIAGNOSIS — M79.605 CHRONIC PAIN OF BOTH LOWER EXTREMITIES: Primary | ICD-10-CM

## 2019-05-20 DIAGNOSIS — G89.29 CHRONIC PAIN OF BOTH LOWER EXTREMITIES: Primary | ICD-10-CM

## 2019-05-20 PROCEDURE — 99283 EMERGENCY DEPT VISIT LOW MDM: CPT

## 2019-05-20 PROCEDURE — 6360000002 HC RX W HCPCS: Performed by: EMERGENCY MEDICINE

## 2019-05-20 PROCEDURE — 2580000003 HC RX 258: Performed by: EMERGENCY MEDICINE

## 2019-05-20 PROCEDURE — 96374 THER/PROPH/DIAG INJ IV PUSH: CPT

## 2019-05-20 RX ORDER — 0.9 % SODIUM CHLORIDE 0.9 %
500 INTRAVENOUS SOLUTION INTRAVENOUS ONCE
Status: COMPLETED | OUTPATIENT
Start: 2019-05-20 | End: 2019-05-20

## 2019-05-20 RX ORDER — ORPHENADRINE CITRATE 30 MG/ML
60 INJECTION INTRAMUSCULAR; INTRAVENOUS ONCE
Status: COMPLETED | OUTPATIENT
Start: 2019-05-20 | End: 2019-05-20

## 2019-05-20 RX ADMIN — ORPHENADRINE CITRATE 60 MG: 30 INJECTION INTRAMUSCULAR; INTRAVENOUS at 03:48

## 2019-05-20 RX ADMIN — SODIUM CHLORIDE 500 ML: 9 INJECTION, SOLUTION INTRAVENOUS at 03:48

## 2019-05-20 ASSESSMENT — PAIN SCALES - GENERAL: PAINLEVEL_OUTOF10: 10

## 2019-05-20 NOTE — ED PROVIDER NOTES
2008    Impaired ambulation     USES TRANFER CHAIR WALKER OR CANE    Kidney stone 2018    PRESENTLY-SMALL    Morbid obesity with BMI of 40.0-44.9, adult ([facility] [address] 12/30/2016    ECTOR on CPAP 2008    USES C-PAP NIGHTLY    Osteoarthritis     Parkinson disease ([facility] [address] 2015    Restless leg syndrome 2013    MILD    Spinal stenosis in cervical region 6/2/2013    Type II or unspecified type diabetes mellitus without mention of complication, not stated as uncontrolled     Unspecified sleep apnea     cpap nightly    Urethral caruncle 3/8/2018    Wears glasses      SURGICAL HISTORY       Past Surgical History:   Procedure Laterality Date    APPENDECTOMY      BRONCHOSCOPY  06/05/2018    CERVICAL [address] SURGERY  2012    CERVICAL SPINE SURGERY  5/31/13    posterior c5-t1    COLONOSCOPY  2009    COLONOSCOPY  12/29/2016    incomplete was not cleaned out    COLONOSCOPY  12/30/2016    COLONOSCOPY  04/25/2017     SIGMOID COLON POLYPECTOMY:  HYPERPLASTIC POLYP ,   DIVERTICULOSIS    CYSTORRHAPHY  04/04/2018    EYE SURGERY Bilateral 2017    CATARACTS W/ IOL    HERNIA REPAIR  1999    VENTRAL    LAMINECTOMY  05/31/2013    Dr. Avendano Scriver DX W/CELL WASHG 100 [facility] N/A 6/5/2018    BRONCHOSCOPY performed by Melissa Villa MD at [address] N/A 4/25/2017    COLONOSCOPY POLYPECTOMY HOT BIOPSY performed by Lidya Simon MD at [facility] N/A 4/4/2018    CYSTOSCOPY performed by Caleb Mello MD at [facility]  12/28/2016    gastritis, esophagitis,     UPPER GASTROINTESTINAL ENDOSCOPY  08/29/2018    with biopsy    UPPER GASTROINTESTINAL ENDOSCOPY N/A 8/29/2018    EGD BIOPSY performed by Lidya Simon MD at [facility]       Previous Medications    ALBUTEROL (ACCUNEB) 1.25 MG/3ML NEBULIZER SOLUTION    Inhale 1 ampule into the lungs every 6 hours as needed for Wheezing or Shortness of Breath    ALPRAZOLAM (XANAX) 0.25 MG TABLET    Take 1 tablet by mouth nightly as needed for Sleep or Anxiety for up to 30 days. AMIODARONE (CORDARONE) 200 MG TABLET    Take 200 mg by mouth daily     ASPIRIN 81 MG TABLET    Take 81 mg by mouth daily     ATORVASTATIN (LIPITOR) 20 MG TABLET    TAKE 1 TABLET DAILY    BREO ELLIPTA 100-25 MCG/INH AEPB INHALER    USE 1 INHALATION DAILY    CALCITRIOL (ROCALTROL) 0.25 MCG CAPSULE    TAKE 1 CAPSULE THREE TIMES A DAY    CALCIUM CITRATE PO    Take 500 mg by mouth 3 times daily     CARBIDOPA-LEVODOPA (SINEMET CR)  MG PER EXTENDED RELEASE TABLET    Take 1 tablet by mouth 4 times daily    CONJUGATED ESTROGENS (PREMARIN) 0.625 MG/GM VAGINAL CREAM    Place 0.5 g vaginally daily as needed Place vaginally daily. CYANOCOBALAMIN (VITAMIN B-12) 2500 MCG SUBL    Place 2,500 mcg under the tongue daily    CYANOCOBALAMIN 1000 MCG/ML INJECTION    Inject 1,000 mcg into the muscle See Admin Instructions Every 3 weeks    CYCLOBENZAPRINE (FLEXERIL) 10 MG TABLET    Take 1 tablet by mouth nightly as needed for Muscle spasms    FERROUS SULFATE 325 (65 FE) MG EC TABLET    Take 1 tablet by mouth 2 times daily (with meals)    FERROUS SULFATE 325 (65 FE) MG TABLET    Take 1 tablet by mouth 2 times daily    HYDROCODONE-ACETAMINOPHEN (NORCO) 5-325 MG PER TABLET    Take 1 tablet by mouth 2 times daily as needed for Pain for up to 4 days.     IPRATROPIUM-ALBUTEROL (DUONEB) 0.5-2.5 (3) MG/3ML SOLN NEBULIZER SOLUTION    Inhale 3 mLs into the lungs three times daily    LINAGLIPTIN (TRADJENTA) 5 MG TABLET    Take 0.5 tablets by mouth daily    MAGNESIUM OXIDE (MAG-OX) 400 (241.3 MG) MG TABS TABLET    Take 1 tablet by mouth 2 times daily    MELATONIN 3 MG TABS TABLET    Take 1 tablet by mouth nightly as needed (sleep)    METOPROLOL TARTRATE (LOPRESSOR) 25 MG TABLET    Take 1 tablet by mouth daily    NAPROXEN (NAPROSYN) 250 MG TABLET    Take 1 tablet by mouth 2 times daily as needed for Pain    NYSTATIN (MYCOSTATIN) 686057 UNIT/GM POWDER    Apply 3 times daily. ONE TOUCH ULTRA TEST STRIP    USE 1 STRIP THREE TIMES A DAY    ONETOUCH DELICA LANCETS 50W MISC    1 Units by Does not apply route 2 times daily    ONETOUCH VERIO STRIP    1 each by In Vitro route daily As needed. OXYGEN CONCENTRATOR    Dx: Pneumonia, use as directed. PANTOPRAZOLE (PROTONIX) 40 MG TABLET    TAKE 1 TABLET TWICE A DAY BEFORE MEALS    POTASSIUM CHLORIDE (MICRO-K) 10 MEQ EXTENDED RELEASE CAPSULE    Take 2 capsules by mouth daily    RASAGILINE MESYLATE 1 MG TABS    Take 1 tablet by mouth daily    ROPINIROLE (REQUIP) 2 MG TABLET    Take 1 tablet by mouth 3 times daily    SENNA (SENOKOT) 8.6 MG TABLET    Take 2 tablets by mouth nightly     SPIRONOLACTONE (ALDACTONE) 25 MG TABLET    Take 1 tablet by mouth daily    VITAMIN D (CHOLECALCIFEROL) 5000 UNITS CAPS CAPSULE    Take 5,000 Units by mouth daily     ALLERGIES     is allergic to dye [barium-containing compounds]; pcn [penicillins]; and bactrim [sulfamethoxazole-trimethoprim]. FAMILY HISTORY     indicated that her mother is . She indicated that her father is . She indicated that her maternal grandmother is . She indicated that her maternal grandfather is . She indicated that her paternal grandmother is . She indicated that her paternal grandfather is . SOCIAL HISTORY       Social History     Tobacco Use    Smoking status: Former Smoker     Packs/day: 2.00     Years: 15.00     Pack years: 30.00     Types: Cigarettes     Last attempt to quit: 10/31/1970     Years since quittin.5    Smokeless tobacco: Never Used   Substance Use Topics    Alcohol use: Yes     Alcohol/week: 1.2 oz     Types: 2 Shots of liquor per week     Comment: 4 DRINKS A YEAR    Drug use: No     Comment: Pt denies use.       PHYSICAL EXAM     INITIAL VITALS:   Vitals:    19 0311   BP: (!) 149/36   Pulse: 77 Resp: 22   Temp: 98.6 °F (37 °C)   TempSrc: Oral   SpO2: 100%   Weight: 183 lb (83 kg)   Height: 5' 1\" (1.549 m)       Physical Exam   Constitutional: She is oriented to person, place, and time. She appears well-developed and well-nourished. HENT:   Head: Normocephalic and atraumatic. Eyes: Conjunctivae and EOM are normal.   Neck: Normal range of motion. Neck supple. Cardiovascular: Normal rate and regular rhythm. Pulmonary/Chest: Effort normal and breath sounds normal.   Abdominal: Soft. She exhibits no mass. There is no tenderness. Musculoskeletal: Normal range of motion. She exhibits no edema, tenderness or deformity. Tenderness palpation over to the bilateral SI joints. 5 out of 5 strength in bilateral lower extremity. Neurological: She is alert and oriented to person, place, and time. Skin: Skin is warm and dry. MEDICAL DECISION MAKING:     Evaluation of exacerbation of chronic lower extremity. Patient only wanted pain control. Patient was given 60 mg of IV Norflex. Patient improved significantly with discharge. CRITICAL CARE:       PROCEDURES:    Procedures    DIAGNOSTIC RESULTS   EKG:All EKG's are interpreted by the Emergency Department Physician who either signs or Co-signs this chart in the absence of a cardiologist.        RADIOLOGY:All plain film, CT, MRI, and formal ultrasound images (except ED bedside ultrasound) are read by the radiologist, see reports below, unless otherwisenoted in MDM or here. No orders to display     LABS: All lab results were reviewed by myself, and all abnormals are listed below.   Labs Reviewed - No data to display    EMERGENCY DEPARTMENTCOURSE:         Vitals:    Vitals:    05/20/19 0311   BP: (!) 149/36   Pulse: 77   Resp: 22   Temp: 98.6 °F (37 °C)   TempSrc: Oral   SpO2: 100%   Weight: 183 lb (83 kg)   Height: 5' 1\" (1.549 m)       The patient was given the following medications while in the emergency department:  Orders Placed This Encounter   Medications    0.9 % sodium chloride bolus    orphenadrine (NORFLEX) injection 60 mg     CONSULTS:  None    FINAL IMPRESSION      1.  Chronic pain of both lower extremities          DISPOSITION/PLAN   DISPOSITION Decision To Discharge 05/20/2019 04:30:45 AM      PATIENT REFERRED TO:  Yaritza Colvin DO  [address] [place] [address] 22.  [phone]    Schedule an appointment as soon as possible for a visit       DISCHARGE MEDICATIONS:  New Prescriptions    No medications on file     Saundra Ordaz MD  Attending Emergency Physician                  Corina Reynolds MD  05/20/19 [id number]

## 2019-05-20 NOTE — ED NOTES
Patient presents to the ED per EMS with complaints of right leg pain. She states that the pain is in her right thigh and goes up into her groin. Patient states that she was seen here last night for the same complaint but she was not prescribed very many norco and she can't take very many of them so her pain is still really bad. Patient has doppler signals to the DP and PT with redness to the leg. Patient is able to move all toes and bend with no difficulty.       Keyanna Brush, LILLY  05/20/19 Regina Chandra RN  05/20/19 [id number]

## 2019-05-21 ENCOUNTER — HOSPITAL ENCOUNTER (OUTPATIENT)
Dept: INFUSION THERAPY | Age: 74
Discharge: HOME | End: 2019-05-21
Payer: COMMERCIAL

## 2019-05-21 ENCOUNTER — HOSPITAL ENCOUNTER (OUTPATIENT)
Age: 74
Discharge: HOME | End: 2019-05-21
Payer: COMMERCIAL

## 2019-05-21 DIAGNOSIS — E11.22 CKD STAGE 4 DUE TO TYPE 2 DIABETES MELLITUS (HCC): Chronic | ICD-10-CM

## 2019-05-21 DIAGNOSIS — K90.89 OTHER SPECIFIED INTESTINAL MALABSORPTION: Primary | ICD-10-CM

## 2019-05-21 DIAGNOSIS — E61.1 IRON DEFICIENCY: ICD-10-CM

## 2019-05-21 DIAGNOSIS — N20.0 NEPHROLITHIASIS: Chronic | ICD-10-CM

## 2019-05-21 DIAGNOSIS — N18.4 CKD STAGE 4 DUE TO TYPE 2 DIABETES MELLITUS (HCC): Chronic | ICD-10-CM

## 2019-05-21 DIAGNOSIS — N18.4 ANEMIA OF CHRONIC RENAL FAILURE, STAGE 4 (SEVERE) (HCC): ICD-10-CM

## 2019-05-21 DIAGNOSIS — D63.1 ANEMIA OF CHRONIC RENAL FAILURE, STAGE 4 (SEVERE) (HCC): ICD-10-CM

## 2019-05-21 LAB
ABSOLUTE EOS #: 0.2 K/UL (ref 0–0.4)
ABSOLUTE IMMATURE GRANULOCYTE: ABNORMAL K/UL (ref 0–0.3)
ABSOLUTE LYMPH #: 1.1 K/UL (ref 1–4.8)
ABSOLUTE MONO #: 0.5 K/UL (ref 0.1–1.2)
BASOPHILS # BLD: 1 % (ref 0–2)
BASOPHILS ABSOLUTE: 0.1 K/UL (ref 0–0.2)
DIFFERENTIAL TYPE: ABNORMAL
EOSINOPHILS RELATIVE PERCENT: 3 % (ref 1–4)
HCT VFR BLD CALC: 32 % (ref 36–46)
HEMOGLOBIN: 10.5 G/DL (ref 12–16)
IMMATURE GRANULOCYTES: ABNORMAL %
LYMPHOCYTES # BLD: 14 % (ref 24–44)
MCH RBC QN AUTO: 32.4 PG (ref 26–34)
MCHC RBC AUTO-ENTMCNC: 32.9 G/DL (ref 31–37)
MCV RBC AUTO: 98.7 FL (ref 80–100)
MONOCYTES # BLD: 7 % (ref 2–11)
NRBC AUTOMATED: ABNORMAL PER 100 WBC
PDW BLD-RTO: 16.1 % (ref 12.5–15.4)
PLATELET # BLD: 117 K/UL (ref 140–450)
PLATELET ESTIMATE: ABNORMAL
PMV BLD AUTO: 9.5 FL (ref 6–12)
RBC # BLD: 3.24 M/UL (ref 4–5.2)
RBC # BLD: ABNORMAL 10*6/UL
SEG NEUTROPHILS: 75 % (ref 36–66)
SEGMENTED NEUTROPHILS ABSOLUTE COUNT: 5.8 K/UL (ref 1.8–7.7)
WBC # BLD: 7.7 K/UL (ref 3.5–11)
WBC # BLD: ABNORMAL 10*3/UL

## 2019-05-21 PROCEDURE — 85025 COMPLETE CBC W/AUTO DIFF WBC: CPT

## 2019-05-21 PROCEDURE — 6360000002 HC RX W HCPCS: Performed by: INTERNAL MEDICINE

## 2019-05-21 PROCEDURE — 96372 THER/PROPH/DIAG INJ SC/IM: CPT

## 2019-05-21 RX ORDER — CYANOCOBALAMIN 1000 UG/ML
1000 INJECTION INTRAMUSCULAR; SUBCUTANEOUS ONCE
Status: CANCELLED
Start: 2019-06-04

## 2019-05-21 RX ORDER — ACETAMINOPHEN 325 MG/1
325 TABLET ORAL
Status: CANCELLED | OUTPATIENT
Start: 2019-06-04

## 2019-05-21 RX ORDER — CYANOCOBALAMIN 1000 UG/ML
1000 INJECTION INTRAMUSCULAR; SUBCUTANEOUS ONCE
Status: COMPLETED
Start: 2019-05-21 | End: 2019-05-21

## 2019-05-21 RX ADMIN — CYANOCOBALAMIN 1000 MCG: 1000 INJECTION, SOLUTION INTRAMUSCULAR at 13:13

## 2019-05-22 ENCOUNTER — TELEPHONE (OUTPATIENT)
Dept: FAMILY MEDICINE CLINIC | Age: 74
End: 2019-05-22

## 2019-05-22 DIAGNOSIS — M79.604 CHRONIC PAIN OF BOTH LOWER EXTREMITIES: Primary | ICD-10-CM

## 2019-05-22 DIAGNOSIS — G89.29 CHRONIC PAIN OF BOTH LOWER EXTREMITIES: Primary | ICD-10-CM

## 2019-05-22 DIAGNOSIS — M79.605 CHRONIC PAIN OF BOTH LOWER EXTREMITIES: Primary | ICD-10-CM

## 2019-05-22 RX ORDER — HYDROCODONE BITARTRATE AND ACETAMINOPHEN 5; 325 MG/1; MG/1
1 TABLET ORAL EVERY 4 HOURS PRN
Qty: 60 TABLET | Refills: 0 | Status: ON HOLD | OUTPATIENT
Start: 2019-05-22 | End: 2019-05-30 | Stop reason: HOSPADM

## 2019-05-22 NOTE — TELEPHONE ENCOUNTER
Pt spouse called and would like something called in for pt for her pain. Pt is in tears due to the pain and cannot take it. Pt went to the ER 3 times this week. OTC remedies are not working. Please advise. The hospital did give her norco and that helped a little but whatever you can send to help would be great.

## 2019-05-26 ENCOUNTER — APPOINTMENT (OUTPATIENT)
Dept: GENERAL RADIOLOGY | Age: 74
DRG: 493 | End: 2019-05-26
Payer: COMMERCIAL

## 2019-05-26 ENCOUNTER — HOSPITAL ENCOUNTER (INPATIENT)
Age: 74
LOS: 4 days | Discharge: NURSING, REHAB OR LONG-TERM CARE | DRG: 493 | End: 2019-05-30
Attending: EMERGENCY MEDICINE | Admitting: INTERNAL MEDICINE
Payer: COMMERCIAL

## 2019-05-26 ENCOUNTER — APPOINTMENT (OUTPATIENT)
Dept: CT IMAGING | Age: 74
DRG: 493 | End: 2019-05-26
Payer: COMMERCIAL

## 2019-05-26 DIAGNOSIS — S42.201A CLOSED FRACTURE OF PROXIMAL END OF RIGHT HUMERUS, UNSPECIFIED FRACTURE MORPHOLOGY, INITIAL ENCOUNTER: ICD-10-CM

## 2019-05-26 DIAGNOSIS — E61.1 IRON DEFICIENCY: ICD-10-CM

## 2019-05-26 DIAGNOSIS — F41.9 ANXIETY: ICD-10-CM
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
## 2019-05-26 DIAGNOSIS — N17.9 AKI (ACUTE KIDNEY INJURY) (HCC): Primary | ICD-10-CM

## 2019-05-26 DIAGNOSIS — F51.01 PRIMARY INSOMNIA: ICD-10-CM

## 2019-05-26 PROBLEM — N18.4 CKD STAGE 4 DUE TO TYPE 2 DIABETES MELLITUS (HCC): Status: ACTIVE | Noted: 2018-03-08

## 2019-05-26 PROBLEM — E87.5 HYPERKALEMIA: Status: ACTIVE | Noted: 2019-05-26

## 2019-05-26 LAB
-: ABNORMAL
ABSOLUTE EOS #: 0.03 K/UL (ref 0–0.44)
ABSOLUTE IMMATURE GRANULOCYTE: 0.08 K/UL (ref 0–0.3)
ABSOLUTE LYMPH #: 0.95 K/UL (ref 1.1–3.7)
ABSOLUTE MONO #: 0.63 K/UL (ref 0.1–1.2)
AMORPHOUS: ABNORMAL
ANION GAP SERPL CALCULATED.3IONS-SCNC: 11 MMOL/L (ref 9–17)
BACTERIA: ABNORMAL
BASOPHILS # BLD: 0 % (ref 0–2)
BASOPHILS ABSOLUTE: 0.03 K/UL (ref 0–0.2)
BILIRUBIN URINE: NEGATIVE
BUN BLDV-MCNC: 44 MG/DL (ref 8–23)
BUN/CREAT BLD: 20 (ref 9–20)
CALCIUM SERPL-MCNC: 10.1 MG/DL (ref 8.6–10.4)
CASTS UA: ABNORMAL /LPF
CHLORIDE BLD-SCNC: 98 MMOL/L (ref 98–107)
CO2: 31 MMOL/L (ref 20–31)
COLOR: YELLOW
COMMENT UA: ABNORMAL
CREAT SERPL-MCNC: 2.25 MG/DL (ref 0.5–0.9)
CRYSTALS, UA: ABNORMAL /HPF
DIFFERENTIAL TYPE: ABNORMAL
EOSINOPHILS RELATIVE PERCENT: 0 % (ref 1–4)
EPITHELIAL CELLS UA: ABNORMAL /HPF (ref 0–5)
GFR AFRICAN AMERICAN: 26 ML/MIN
GFR NON-AFRICAN AMERICAN: 21 ML/MIN
GFR SERPL CREATININE-BSD FRML MDRD: ABNORMAL ML/MIN/{1.73_M2}
GFR SERPL CREATININE-BSD FRML MDRD: ABNORMAL ML/MIN/{1.73_M2}
GLUCOSE BLD-MCNC: 119 MG/DL (ref 65–105)
GLUCOSE BLD-MCNC: 121 MG/DL (ref 65–105)
GLUCOSE BLD-MCNC: 132 MG/DL (ref 65–105)
GLUCOSE BLD-MCNC: 132 MG/DL (ref 65–105)
GLUCOSE BLD-MCNC: 151 MG/DL (ref 70–99)
GLUCOSE URINE: NEGATIVE
HCT VFR BLD CALC: 33.4 % (ref 36.3–47.1)
HEMOGLOBIN: 9.8 G/DL (ref 11.9–15.1)
IMMATURE GRANULOCYTES: 1 %
INR BLD: 1
KETONES, URINE: NEGATIVE
LEUKOCYTE ESTERASE, URINE: ABNORMAL
LYMPHOCYTES # BLD: 9 % (ref 24–43)
MCH RBC QN AUTO: 30.6 PG (ref 25.2–33.5)
MCHC RBC AUTO-ENTMCNC: 29.3 G/DL (ref 28.4–34.8)
MCV RBC AUTO: 104.4 FL (ref 82.6–102.9)
MONOCYTES # BLD: 6 % (ref 3–12)
MUCUS: ABNORMAL
NITRITE, URINE: NEGATIVE
NRBC AUTOMATED: 0 PER 100 WBC
OTHER OBSERVATIONS UA: ABNORMAL
PARTIAL THROMBOPLASTIN TIME: 25.4 SEC (ref 23–31)
PDW BLD-RTO: 14.6 % (ref 11.8–14.4)
PH UA: 6 (ref 5–8)
PLATELET # BLD: 101 K/UL (ref 138–453)
PLATELET ESTIMATE: ABNORMAL
PMV BLD AUTO: 11.6 FL (ref 8.1–13.5)
POTASSIUM SERPL-SCNC: 5 MMOL/L (ref 3.7–5.3)
POTASSIUM SERPL-SCNC: 5.5 MMOL/L (ref 3.7–5.3)
PROTEIN UA: NEGATIVE
PROTHROMBIN TIME: 10 SEC (ref 9.7–11.6)
RBC # BLD: 3.2 M/UL (ref 3.95–5.11)
RBC # BLD: ABNORMAL 10*6/UL
RBC UA: ABNORMAL /HPF (ref 0–2)
RENAL EPITHELIAL, UA: ABNORMAL /HPF
SEG NEUTROPHILS: 84 % (ref 36–65)
SEGMENTED NEUTROPHILS ABSOLUTE COUNT: 9.2 K/UL (ref 1.5–8.1)
SODIUM BLD-SCNC: 140 MMOL/L (ref 135–144)
SPECIFIC GRAVITY UA: 1.02 (ref 1–1.03)
TRICHOMONAS: ABNORMAL
TURBIDITY: CLEAR
URINE HGB: ABNORMAL
UROBILINOGEN, URINE: NORMAL
WBC # BLD: 10.9 K/UL (ref 3.5–11.3)
WBC # BLD: ABNORMAL 10*3/UL
WBC UA: ABNORMAL /HPF (ref 0–5)
YEAST: ABNORMAL

## 2019-05-26 PROCEDURE — 97530 THERAPEUTIC ACTIVITIES: CPT

## 2019-05-26 PROCEDURE — 85025 COMPLETE CBC W/AUTO DIFF WBC: CPT

## 2019-05-26 PROCEDURE — 85610 PROTHROMBIN TIME: CPT

## 2019-05-26 PROCEDURE — 2700000000 HC OXYGEN THERAPY PER DAY

## 2019-05-26 PROCEDURE — 6360000002 HC RX W HCPCS: Performed by: NURSE PRACTITIONER

## 2019-05-26 PROCEDURE — 93005 ELECTROCARDIOGRAM TRACING: CPT | Performed by: INTERNAL MEDICINE

## 2019-05-26 PROCEDURE — 97163 PT EVAL HIGH COMPLEX 45 MIN: CPT

## 2019-05-26 PROCEDURE — 82947 ASSAY GLUCOSE BLOOD QUANT: CPT

## 2019-05-26 PROCEDURE — 72170 X-RAY EXAM OF PELVIS: CPT

## 2019-05-26 PROCEDURE — 6370000000 HC RX 637 (ALT 250 FOR IP): Performed by: NURSE PRACTITIONER

## 2019-05-26 PROCEDURE — 70450 CT HEAD/BRAIN W/O DYE: CPT

## 2019-05-26 PROCEDURE — 85730 THROMBOPLASTIN TIME PARTIAL: CPT

## 2019-05-26 PROCEDURE — 99223 1ST HOSP IP/OBS HIGH 75: CPT | Performed by: INTERNAL MEDICINE

## 2019-05-26 PROCEDURE — 99285 EMERGENCY DEPT VISIT HI MDM: CPT

## 2019-05-26 PROCEDURE — 2580000003 HC RX 258: Performed by: NURSE PRACTITIONER

## 2019-05-26 PROCEDURE — 71100 X-RAY EXAM RIBS UNI 2 VIEWS: CPT

## 2019-05-26 PROCEDURE — 73552 X-RAY EXAM OF FEMUR 2/>: CPT

## 2019-05-26 PROCEDURE — 1200000000 HC SEMI PRIVATE

## 2019-05-26 PROCEDURE — 2580000003 HC RX 258: Performed by: EMERGENCY MEDICINE

## 2019-05-26 PROCEDURE — 81001 URINALYSIS AUTO W/SCOPE: CPT

## 2019-05-26 PROCEDURE — 71045 X-RAY EXAM CHEST 1 VIEW: CPT

## 2019-05-26 PROCEDURE — 6360000002 HC RX W HCPCS: Performed by: EMERGENCY MEDICINE

## 2019-05-26 PROCEDURE — 94640 AIRWAY INHALATION TREATMENT: CPT

## 2019-05-26 PROCEDURE — 73030 X-RAY EXAM OF SHOULDER: CPT

## 2019-05-26 PROCEDURE — 51702 INSERT TEMP BLADDER CATH: CPT

## 2019-05-26 PROCEDURE — 72125 CT NECK SPINE W/O DYE: CPT

## 2019-05-26 PROCEDURE — 94660 CPAP INITIATION&MGMT: CPT

## 2019-05-26 PROCEDURE — 96374 THER/PROPH/DIAG INJ IV PUSH: CPT

## 2019-05-26 PROCEDURE — 73590 X-RAY EXAM OF LOWER LEG: CPT

## 2019-05-26 PROCEDURE — 36415 COLL VENOUS BLD VENIPUNCTURE: CPT

## 2019-05-26 PROCEDURE — 96375 TX/PRO/DX INJ NEW DRUG ADDON: CPT

## 2019-05-26 PROCEDURE — 84132 ASSAY OF SERUM POTASSIUM: CPT

## 2019-05-26 PROCEDURE — 80048 BASIC METABOLIC PNL TOTAL CA: CPT

## 2019-05-26 RX ORDER — RASAGILINE 0.5 MG/1
1 TABLET ORAL DAILY
Status: DISCONTINUED | OUTPATIENT
Start: 2019-05-26 | End: 2019-05-30 | Stop reason: HOSPADM

## 2019-05-26 RX ORDER — CARBIDOPA AND LEVODOPA 25; 100 MG/1; MG/1
1 TABLET, EXTENDED RELEASE ORAL 4 TIMES DAILY
Status: DISCONTINUED | OUTPATIENT
Start: 2019-05-26 | End: 2019-05-30 | Stop reason: HOSPADM

## 2019-05-26 RX ORDER — AMIODARONE HYDROCHLORIDE 200 MG/1
200 TABLET ORAL DAILY
Status: DISCONTINUED | OUTPATIENT
Start: 2019-05-26 | End: 2019-05-30 | Stop reason: HOSPADM

## 2019-05-26 RX ORDER — ONDANSETRON 2 MG/ML
4 INJECTION INTRAMUSCULAR; INTRAVENOUS EVERY 6 HOURS PRN
Status: DISCONTINUED | OUTPATIENT
Start: 2019-05-26 | End: 2019-05-30 | Stop reason: HOSPADM

## 2019-05-26 RX ORDER — POTASSIUM CHLORIDE 750 MG/1
20 CAPSULE, EXTENDED RELEASE ORAL DAILY
Status: DISCONTINUED | OUTPATIENT
Start: 2019-05-26 | End: 2019-05-26

## 2019-05-26 RX ORDER — PANTOPRAZOLE SODIUM 40 MG/1
40 TABLET, DELAYED RELEASE ORAL
Status: DISCONTINUED | OUTPATIENT
Start: 2019-05-26 | End: 2019-05-30 | Stop reason: HOSPADM

## 2019-05-26 RX ORDER — SODIUM CHLORIDE 9 MG/ML
INJECTION, SOLUTION INTRAVENOUS CONTINUOUS
Status: DISCONTINUED | OUTPATIENT
Start: 2019-05-26 | End: 2019-05-26

## 2019-05-26 RX ORDER — NICOTINE POLACRILEX 4 MG
15 LOZENGE BUCCAL PRN
Status: DISCONTINUED | OUTPATIENT
Start: 2019-05-26 | End: 2019-05-30 | Stop reason: HOSPADM

## 2019-05-26 RX ORDER — SODIUM CHLORIDE 0.9 % (FLUSH) 0.9 %
10 SYRINGE (ML) INJECTION PRN
Status: DISCONTINUED | OUTPATIENT
Start: 2019-05-26 | End: 2019-05-30 | Stop reason: HOSPADM

## 2019-05-26 RX ORDER — ROPINIROLE 1 MG/1
2 TABLET, FILM COATED ORAL 3 TIMES DAILY
Status: DISCONTINUED | OUTPATIENT
Start: 2019-05-26 | End: 2019-05-30 | Stop reason: HOSPADM

## 2019-05-26 RX ORDER — CALCITRIOL 0.25 UG/1
0.25 CAPSULE, LIQUID FILLED ORAL 3 TIMES DAILY
Status: DISCONTINUED | OUTPATIENT
Start: 2019-05-26 | End: 2019-05-30 | Stop reason: HOSPADM

## 2019-05-26 RX ORDER — LANOLIN ALCOHOL/MO/W.PET/CERES
3 CREAM (GRAM) TOPICAL NIGHTLY PRN
Status: DISCONTINUED | OUTPATIENT
Start: 2019-05-26 | End: 2019-05-30 | Stop reason: HOSPADM

## 2019-05-26 RX ORDER — POTASSIUM CHLORIDE 7.45 MG/ML
10 INJECTION INTRAVENOUS PRN
Status: DISCONTINUED | OUTPATIENT
Start: 2019-05-26 | End: 2019-05-26

## 2019-05-26 RX ORDER — ALPRAZOLAM 0.25 MG/1
0.25 TABLET ORAL NIGHTLY PRN
Status: DISCONTINUED | OUTPATIENT
Start: 2019-05-26 | End: 2019-05-30 | Stop reason: HOSPADM

## 2019-05-26 RX ORDER — ONDANSETRON 4 MG/1
4 TABLET, ORALLY DISINTEGRATING ORAL EVERY 6 HOURS PRN
Status: DISCONTINUED | OUTPATIENT
Start: 2019-05-26 | End: 2019-05-30 | Stop reason: HOSPADM

## 2019-05-26 RX ORDER — 0.9 % SODIUM CHLORIDE 0.9 %
1000 INTRAVENOUS SOLUTION INTRAVENOUS ONCE
Status: COMPLETED | OUTPATIENT
Start: 2019-05-26 | End: 2019-05-26

## 2019-05-26 RX ORDER — DEXTROSE MONOHYDRATE 50 MG/ML
100 INJECTION, SOLUTION INTRAVENOUS PRN
Status: DISCONTINUED | OUTPATIENT
Start: 2019-05-26 | End: 2019-05-30 | Stop reason: HOSPADM

## 2019-05-26 RX ORDER — NICOTINE 21 MG/24HR
1 PATCH, TRANSDERMAL 24 HOURS TRANSDERMAL DAILY PRN
Status: DISCONTINUED | OUTPATIENT
Start: 2019-05-26 | End: 2019-05-30 | Stop reason: HOSPADM

## 2019-05-26 RX ORDER — ATORVASTATIN CALCIUM 20 MG/1
20 TABLET, FILM COATED ORAL DAILY
Status: DISCONTINUED | OUTPATIENT
Start: 2019-05-26 | End: 2019-05-30 | Stop reason: HOSPADM

## 2019-05-26 RX ORDER — ALBUTEROL SULFATE 2.5 MG/3ML
1.25 SOLUTION RESPIRATORY (INHALATION) EVERY 6 HOURS PRN
Status: DISCONTINUED | OUTPATIENT
Start: 2019-05-26 | End: 2019-05-30 | Stop reason: HOSPADM

## 2019-05-26 RX ORDER — MORPHINE SULFATE 4 MG/ML
4 INJECTION, SOLUTION INTRAMUSCULAR; INTRAVENOUS ONCE
Status: COMPLETED | OUTPATIENT
Start: 2019-05-26 | End: 2019-05-26

## 2019-05-26 RX ORDER — DEXTROSE MONOHYDRATE 25 G/50ML
12.5 INJECTION, SOLUTION INTRAVENOUS PRN
Status: DISCONTINUED | OUTPATIENT
Start: 2019-05-26 | End: 2019-05-30 | Stop reason: HOSPADM

## 2019-05-26 RX ORDER — ASPIRIN 81 MG/1
81 TABLET, CHEWABLE ORAL DAILY
Status: DISCONTINUED | OUTPATIENT
Start: 2019-05-26 | End: 2019-05-30 | Stop reason: HOSPADM

## 2019-05-26 RX ORDER — SENNA PLUS 8.6 MG/1
2 TABLET ORAL NIGHTLY
Status: DISCONTINUED | OUTPATIENT
Start: 2019-05-26 | End: 2019-05-30 | Stop reason: HOSPADM

## 2019-05-26 RX ORDER — OXYCODONE HYDROCHLORIDE AND ACETAMINOPHEN 5; 325 MG/1; MG/1
1 TABLET ORAL EVERY 6 HOURS PRN
Status: DISCONTINUED | OUTPATIENT
Start: 2019-05-26 | End: 2019-05-30 | Stop reason: HOSPADM

## 2019-05-26 RX ORDER — ONDANSETRON 2 MG/ML
4 INJECTION INTRAMUSCULAR; INTRAVENOUS ONCE
Status: COMPLETED | OUTPATIENT
Start: 2019-05-26 | End: 2019-05-26

## 2019-05-26 RX ORDER — MAGNESIUM SULFATE 1 G/100ML
1 INJECTION INTRAVENOUS PRN
Status: DISCONTINUED | OUTPATIENT
Start: 2019-05-26 | End: 2019-05-26

## 2019-05-26 RX ORDER — SODIUM CHLORIDE 0.9 % (FLUSH) 0.9 %
10 SYRINGE (ML) INJECTION EVERY 12 HOURS SCHEDULED
Status: DISCONTINUED | OUTPATIENT
Start: 2019-05-26 | End: 2019-05-30 | Stop reason: HOSPADM

## 2019-05-26 RX ORDER — ACETAMINOPHEN 325 MG/1
650 TABLET ORAL EVERY 4 HOURS PRN
Status: DISCONTINUED | OUTPATIENT
Start: 2019-05-26 | End: 2019-05-30 | Stop reason: HOSPADM

## 2019-05-26 RX ORDER — ONDANSETRON 2 MG/ML
4 INJECTION INTRAMUSCULAR; INTRAVENOUS EVERY 6 HOURS PRN
Status: DISCONTINUED | OUTPATIENT
Start: 2019-05-26 | End: 2019-05-26 | Stop reason: SDUPTHER

## 2019-05-26 RX ORDER — PSEUDOEPHEDRINE HCL 30 MG
500 TABLET ORAL 3 TIMES DAILY
Status: DISCONTINUED | OUTPATIENT
Start: 2019-05-26 | End: 2019-05-30 | Stop reason: HOSPADM

## 2019-05-26 RX ORDER — SPIRONOLACTONE 25 MG/1
25 TABLET ORAL DAILY
Status: DISCONTINUED | OUTPATIENT
Start: 2019-05-26 | End: 2019-05-26

## 2019-05-26 RX ORDER — IPRATROPIUM BROMIDE AND ALBUTEROL SULFATE 2.5; .5 MG/3ML; MG/3ML
3 SOLUTION RESPIRATORY (INHALATION) 3 TIMES DAILY
Status: DISCONTINUED | OUTPATIENT
Start: 2019-05-26 | End: 2019-05-30 | Stop reason: HOSPADM

## 2019-05-26 RX ORDER — POTASSIUM CHLORIDE 20 MEQ/1
40 TABLET, EXTENDED RELEASE ORAL PRN
Status: DISCONTINUED | OUTPATIENT
Start: 2019-05-26 | End: 2019-05-26

## 2019-05-26 RX ORDER — LANOLIN ALCOHOL/MO/W.PET/CERES
325 CREAM (GRAM) TOPICAL 2 TIMES DAILY WITH MEALS
Status: DISCONTINUED | OUTPATIENT
Start: 2019-05-26 | End: 2019-05-30 | Stop reason: HOSPADM

## 2019-05-26 RX ADMIN — SODIUM CHLORIDE: 9 INJECTION, SOLUTION INTRAVENOUS at 06:43

## 2019-05-26 RX ADMIN — MAGNESIUM OXIDE TAB 400 MG (241.3 MG ELEMENTAL MG) 400 MG: 400 (241.3 MG) TAB at 20:09

## 2019-05-26 RX ADMIN — PANTOPRAZOLE SODIUM 40 MG: 40 TABLET, DELAYED RELEASE ORAL at 06:42

## 2019-05-26 RX ADMIN — Medication 10 ML: at 20:18

## 2019-05-26 RX ADMIN — ONDANSETRON 4 MG: 2 INJECTION INTRAMUSCULAR; INTRAVENOUS at 03:10

## 2019-05-26 RX ADMIN — METOPROLOL TARTRATE 25 MG: 25 TABLET ORAL at 10:07

## 2019-05-26 RX ADMIN — ROPINIROLE HYDROCHLORIDE 2 MG: 1 TABLET, FILM COATED ORAL at 20:09

## 2019-05-26 RX ADMIN — MORPHINE SULFATE 4 MG: 4 INJECTION INTRAVENOUS at 03:10

## 2019-05-26 RX ADMIN — ROPINIROLE HYDROCHLORIDE 2 MG: 1 TABLET, FILM COATED ORAL at 13:36

## 2019-05-26 RX ADMIN — CALCITRIOL 0.25 MCG: 0.25 CAPSULE ORAL at 10:08

## 2019-05-26 RX ADMIN — CARBIDOPA AND LEVODOPA 1 TABLET: 25; 100 TABLET, EXTENDED RELEASE ORAL at 10:08

## 2019-05-26 RX ADMIN — CARBIDOPA AND LEVODOPA 1 TABLET: 25; 100 TABLET, EXTENDED RELEASE ORAL at 20:19

## 2019-05-26 RX ADMIN — ENOXAPARIN SODIUM 30 MG: 30 INJECTION SUBCUTANEOUS at 10:07

## 2019-05-26 RX ADMIN — SENNA 17.2 MG: 8.6 TABLET, COATED ORAL at 20:09

## 2019-05-26 RX ADMIN — FERROUS SULFATE TAB EC 325 MG (65 MG FE EQUIVALENT) 325 MG: 325 (65 FE) TABLET DELAYED RESPONSE at 17:29

## 2019-05-26 RX ADMIN — FERROUS SULFATE TAB EC 325 MG (65 MG FE EQUIVALENT) 325 MG: 325 (65 FE) TABLET DELAYED RESPONSE at 10:07

## 2019-05-26 RX ADMIN — ATORVASTATIN CALCIUM 20 MG: 20 TABLET, FILM COATED ORAL at 10:08

## 2019-05-26 RX ADMIN — ASPIRIN 81 MG 81 MG: 81 TABLET ORAL at 10:08

## 2019-05-26 RX ADMIN — LINAGLIPTIN 5 MG: 5 TABLET, FILM COATED ORAL at 10:08

## 2019-05-26 RX ADMIN — CALCITRIOL 0.25 MCG: 0.25 CAPSULE ORAL at 20:09

## 2019-05-26 RX ADMIN — ROPINIROLE HYDROCHLORIDE 2 MG: 1 TABLET, FILM COATED ORAL at 10:08

## 2019-05-26 RX ADMIN — OXYCODONE AND ACETAMINOPHEN 1 TABLET: 5; 325 TABLET ORAL at 13:36

## 2019-05-26 RX ADMIN — OXYCODONE AND ACETAMINOPHEN 1 TABLET: 5; 325 TABLET ORAL at 20:09

## 2019-05-26 RX ADMIN — CALCITRIOL 0.25 MCG: 0.25 CAPSULE ORAL at 13:36

## 2019-05-26 RX ADMIN — PANTOPRAZOLE SODIUM 40 MG: 40 TABLET, DELAYED RELEASE ORAL at 17:29

## 2019-05-26 RX ADMIN — CARBIDOPA AND LEVODOPA 1 TABLET: 25; 100 TABLET, EXTENDED RELEASE ORAL at 17:29

## 2019-05-26 RX ADMIN — CARBIDOPA AND LEVODOPA 1 TABLET: 25; 100 TABLET, EXTENDED RELEASE ORAL at 13:36

## 2019-05-26 RX ADMIN — MAGNESIUM OXIDE TAB 400 MG (241.3 MG ELEMENTAL MG) 400 MG: 400 (241.3 MG) TAB at 10:08

## 2019-05-26 RX ADMIN — IPRATROPIUM BROMIDE AND ALBUTEROL SULFATE 3 ML: .5; 3 SOLUTION RESPIRATORY (INHALATION) at 15:17

## 2019-05-26 RX ADMIN — CHOLECALCIFEROL CAP 125 MCG (5000 UNIT) 5000 UNITS: 125 CAP at 10:08

## 2019-05-26 RX ADMIN — SODIUM CHLORIDE 1000 ML: 9 INJECTION, SOLUTION INTRAVENOUS at 04:12

## 2019-05-26 RX ADMIN — Medication 2 PUFF: at 20:49

## 2019-05-26 RX ADMIN — IPRATROPIUM BROMIDE AND ALBUTEROL SULFATE 3 ML: .5; 3 SOLUTION RESPIRATORY (INHALATION) at 20:49

## 2019-05-26 RX ADMIN — AMIODARONE HYDROCHLORIDE 200 MG: 200 TABLET ORAL at 10:08

## 2019-05-26 RX ADMIN — IPRATROPIUM BROMIDE AND ALBUTEROL SULFATE 3 ML: .5; 3 SOLUTION RESPIRATORY (INHALATION) at 09:28

## 2019-05-26 RX ADMIN — OXYCODONE AND ACETAMINOPHEN 1 TABLET: 5; 325 TABLET ORAL at 07:51

## 2019-05-26 ASSESSMENT — PAIN DESCRIPTION - ONSET
ONSET: ON-GOING

## 2019-05-26 ASSESSMENT — ENCOUNTER SYMPTOMS
BACK PAIN: 1
ABDOMINAL DISTENTION: 0
SHORTNESS OF BREATH: 0
ABDOMINAL PAIN: 0
CHEST TIGHTNESS: 0
FACIAL SWELLING: 0
EYE DISCHARGE: 0
EYE PAIN: 0

## 2019-05-26 ASSESSMENT — PAIN SCALES - GENERAL
PAINLEVEL_OUTOF10: 0
PAINLEVEL_OUTOF10: 7
PAINLEVEL_OUTOF10: 10
PAINLEVEL_OUTOF10: 7
PAINLEVEL_OUTOF10: 6
PAINLEVEL_OUTOF10: 4
PAINLEVEL_OUTOF10: 0
PAINLEVEL_OUTOF10: 0
PAINLEVEL_OUTOF10: 10
PAINLEVEL_OUTOF10: 0
PAINLEVEL_OUTOF10: 0
PAINLEVEL_OUTOF10: 10

## 2019-05-26 ASSESSMENT — PAIN DESCRIPTION - ORIENTATION
ORIENTATION: RIGHT

## 2019-05-26 ASSESSMENT — PAIN DESCRIPTION - FREQUENCY
FREQUENCY: INTERMITTENT
FREQUENCY: CONTINUOUS
FREQUENCY: CONTINUOUS

## 2019-05-26 ASSESSMENT — PAIN - FUNCTIONAL ASSESSMENT: PAIN_FUNCTIONAL_ASSESSMENT: PREVENTS OR INTERFERES SOME ACTIVE ACTIVITIES AND ADLS

## 2019-05-26 ASSESSMENT — PAIN DESCRIPTION - DESCRIPTORS
DESCRIPTORS: DISCOMFORT
DESCRIPTORS: DISCOMFORT;ACHING;STABBING
DESCRIPTORS: ACHING

## 2019-05-26 ASSESSMENT — PAIN DESCRIPTION - PAIN TYPE
TYPE: ACUTE PAIN

## 2019-05-26 ASSESSMENT — PAIN DESCRIPTION - LOCATION
LOCATION: ARM
LOCATION: SHOULDER;ARM
LOCATION: SHOULDER
LOCATION: ARM;SHOULDER

## 2019-05-26 ASSESSMENT — PAIN DESCRIPTION - PROGRESSION: CLINICAL_PROGRESSION: GRADUALLY WORSENING

## 2019-05-27 LAB
ANION GAP SERPL CALCULATED.3IONS-SCNC: 12 MMOL/L (ref 9–17)
BUN BLDV-MCNC: 42 MG/DL (ref 8–23)
BUN/CREAT BLD: 18 (ref 9–20)
CALCIUM SERPL-MCNC: 9.1 MG/DL (ref 8.6–10.4)
CHLORIDE BLD-SCNC: 100 MMOL/L (ref 98–107)
CO2: 28 MMOL/L (ref 20–31)
CREAT SERPL-MCNC: 2.32 MG/DL (ref 0.5–0.9)
GFR AFRICAN AMERICAN: 25 ML/MIN
GFR NON-AFRICAN AMERICAN: 21 ML/MIN
GFR SERPL CREATININE-BSD FRML MDRD: ABNORMAL ML/MIN/{1.73_M2}
GFR SERPL CREATININE-BSD FRML MDRD: ABNORMAL ML/MIN/{1.73_M2}
GLUCOSE BLD-MCNC: 105 MG/DL (ref 65–105)
GLUCOSE BLD-MCNC: 117 MG/DL (ref 65–105)
GLUCOSE BLD-MCNC: 119 MG/DL (ref 65–105)
GLUCOSE BLD-MCNC: 119 MG/DL (ref 70–99)
GLUCOSE BLD-MCNC: 94 MG/DL (ref 65–105)
HCT VFR BLD CALC: 26.1 % (ref 36.3–47.1)
HCT VFR BLD CALC: 27.7 % (ref 36.3–47.1)
HCT VFR BLD CALC: 27.9 % (ref 36.3–47.1)
HEMOGLOBIN: 7.6 G/DL (ref 11.9–15.1)
HEMOGLOBIN: 8.1 G/DL (ref 11.9–15.1)
HEMOGLOBIN: 8.2 G/DL (ref 11.9–15.1)
INR BLD: 1
MCH RBC QN AUTO: 30.9 PG (ref 25.2–33.5)
MCHC RBC AUTO-ENTMCNC: 29.1 G/DL (ref 28.4–34.8)
MCV RBC AUTO: 106.1 FL (ref 82.6–102.9)
NRBC AUTOMATED: 0 PER 100 WBC
PDW BLD-RTO: 14.6 % (ref 11.8–14.4)
PLATELET # BLD: 87 K/UL (ref 138–453)
PMV BLD AUTO: 12 FL (ref 8.1–13.5)
POTASSIUM SERPL-SCNC: 4.8 MMOL/L (ref 3.7–5.3)
PROTHROMBIN TIME: 10.1 SEC (ref 9.7–11.6)
RBC # BLD: 2.46 M/UL (ref 3.95–5.11)
SODIUM BLD-SCNC: 140 MMOL/L (ref 135–144)
WBC # BLD: 11.8 K/UL (ref 3.5–11.3)

## 2019-05-27 PROCEDURE — 82947 ASSAY GLUCOSE BLOOD QUANT: CPT

## 2019-05-27 PROCEDURE — 2580000003 HC RX 258: Performed by: NURSE PRACTITIONER

## 2019-05-27 PROCEDURE — 80048 BASIC METABOLIC PNL TOTAL CA: CPT

## 2019-05-27 PROCEDURE — 97167 OT EVAL HIGH COMPLEX 60 MIN: CPT

## 2019-05-27 PROCEDURE — 6370000000 HC RX 637 (ALT 250 FOR IP): Performed by: NURSE PRACTITIONER

## 2019-05-27 PROCEDURE — 97530 THERAPEUTIC ACTIVITIES: CPT

## 2019-05-27 PROCEDURE — 36415 COLL VENOUS BLD VENIPUNCTURE: CPT

## 2019-05-27 PROCEDURE — 85610 PROTHROMBIN TIME: CPT

## 2019-05-27 PROCEDURE — 6360000002 HC RX W HCPCS: Performed by: NURSE PRACTITIONER

## 2019-05-27 PROCEDURE — 1200000000 HC SEMI PRIVATE

## 2019-05-27 PROCEDURE — 97535 SELF CARE MNGMENT TRAINING: CPT

## 2019-05-27 PROCEDURE — 94660 CPAP INITIATION&MGMT: CPT

## 2019-05-27 PROCEDURE — 97162 PT EVAL MOD COMPLEX 30 MIN: CPT

## 2019-05-27 PROCEDURE — 94640 AIRWAY INHALATION TREATMENT: CPT

## 2019-05-27 PROCEDURE — 85014 HEMATOCRIT: CPT

## 2019-05-27 PROCEDURE — 85027 COMPLETE CBC AUTOMATED: CPT

## 2019-05-27 PROCEDURE — 85018 HEMOGLOBIN: CPT

## 2019-05-27 PROCEDURE — 99232 SBSQ HOSP IP/OBS MODERATE 35: CPT | Performed by: INTERNAL MEDICINE

## 2019-05-27 RX ORDER — MORPHINE SULFATE 4 MG/ML
4 INJECTION, SOLUTION INTRAMUSCULAR; INTRAVENOUS
Status: DISCONTINUED | OUTPATIENT
Start: 2019-05-27 | End: 2019-05-30 | Stop reason: HOSPADM

## 2019-05-27 RX ORDER — MORPHINE SULFATE 2 MG/ML
2 INJECTION, SOLUTION INTRAMUSCULAR; INTRAVENOUS
Status: DISCONTINUED | OUTPATIENT
Start: 2019-05-27 | End: 2019-05-30 | Stop reason: HOSPADM

## 2019-05-27 RX ORDER — SODIUM CHLORIDE 9 MG/ML
INJECTION, SOLUTION INTRAVENOUS CONTINUOUS
Status: DISCONTINUED | OUTPATIENT
Start: 2019-05-27 | End: 2019-05-29

## 2019-05-27 RX ADMIN — Medication 2 PUFF: at 19:36

## 2019-05-27 RX ADMIN — Medication 10 ML: at 20:05

## 2019-05-27 RX ADMIN — MORPHINE SULFATE 2 MG: 2 INJECTION, SOLUTION INTRAMUSCULAR; INTRAVENOUS at 07:56

## 2019-05-27 RX ADMIN — FERROUS SULFATE TAB EC 325 MG (65 MG FE EQUIVALENT) 325 MG: 325 (65 FE) TABLET DELAYED RESPONSE at 17:17

## 2019-05-27 RX ADMIN — Medication 2 PUFF: at 09:41

## 2019-05-27 RX ADMIN — MORPHINE SULFATE 4 MG: 4 INJECTION INTRAVENOUS at 04:28

## 2019-05-27 RX ADMIN — CALCITRIOL 0.25 MCG: 0.25 CAPSULE ORAL at 13:24

## 2019-05-27 RX ADMIN — SENNA 17.2 MG: 8.6 TABLET, COATED ORAL at 20:03

## 2019-05-27 RX ADMIN — CHOLECALCIFEROL CAP 125 MCG (5000 UNIT) 5000 UNITS: 125 CAP at 07:56

## 2019-05-27 RX ADMIN — SODIUM CHLORIDE: 9 INJECTION, SOLUTION INTRAVENOUS at 04:19

## 2019-05-27 RX ADMIN — FERROUS SULFATE TAB EC 325 MG (65 MG FE EQUIVALENT) 325 MG: 325 (65 FE) TABLET DELAYED RESPONSE at 07:56

## 2019-05-27 RX ADMIN — MORPHINE SULFATE 2 MG: 2 INJECTION, SOLUTION INTRAMUSCULAR; INTRAVENOUS at 20:04

## 2019-05-27 RX ADMIN — CALCITRIOL 0.25 MCG: 0.25 CAPSULE ORAL at 20:03

## 2019-05-27 RX ADMIN — CARBIDOPA AND LEVODOPA 1 TABLET: 25; 100 TABLET, EXTENDED RELEASE ORAL at 07:55

## 2019-05-27 RX ADMIN — SODIUM CHLORIDE: 9 INJECTION, SOLUTION INTRAVENOUS at 22:39

## 2019-05-27 RX ADMIN — ENOXAPARIN SODIUM 30 MG: 30 INJECTION SUBCUTANEOUS at 07:56

## 2019-05-27 RX ADMIN — MORPHINE SULFATE 2 MG: 2 INJECTION, SOLUTION INTRAMUSCULAR; INTRAVENOUS at 13:24

## 2019-05-27 RX ADMIN — ROPINIROLE HYDROCHLORIDE 2 MG: 1 TABLET, FILM COATED ORAL at 13:24

## 2019-05-27 RX ADMIN — CARBIDOPA AND LEVODOPA 1 TABLET: 25; 100 TABLET, EXTENDED RELEASE ORAL at 17:17

## 2019-05-27 RX ADMIN — IPRATROPIUM BROMIDE AND ALBUTEROL SULFATE 3 ML: .5; 3 SOLUTION RESPIRATORY (INHALATION) at 14:45

## 2019-05-27 RX ADMIN — Medication 500 MG: at 20:05

## 2019-05-27 RX ADMIN — RASAGILINE 1 MG: 0.5 TABLET ORAL at 07:55

## 2019-05-27 RX ADMIN — MAGNESIUM OXIDE TAB 400 MG (241.3 MG ELEMENTAL MG) 400 MG: 400 (241.3 MG) TAB at 07:56

## 2019-05-27 RX ADMIN — OXYCODONE AND ACETAMINOPHEN 1 TABLET: 5; 325 TABLET ORAL at 17:17

## 2019-05-27 RX ADMIN — OXYCODONE AND ACETAMINOPHEN 1 TABLET: 5; 325 TABLET ORAL at 03:22

## 2019-05-27 RX ADMIN — METOPROLOL TARTRATE 25 MG: 25 TABLET ORAL at 07:56

## 2019-05-27 RX ADMIN — CALCITRIOL 0.25 MCG: 0.25 CAPSULE ORAL at 07:56

## 2019-05-27 RX ADMIN — IPRATROPIUM BROMIDE AND ALBUTEROL SULFATE 3 ML: .5; 3 SOLUTION RESPIRATORY (INHALATION) at 19:30

## 2019-05-27 RX ADMIN — CARBIDOPA AND LEVODOPA 1 TABLET: 25; 100 TABLET, EXTENDED RELEASE ORAL at 13:24

## 2019-05-27 RX ADMIN — ROPINIROLE HYDROCHLORIDE 2 MG: 1 TABLET, FILM COATED ORAL at 07:55

## 2019-05-27 RX ADMIN — OXYCODONE AND ACETAMINOPHEN 1 TABLET: 5; 325 TABLET ORAL at 09:51

## 2019-05-27 RX ADMIN — AMIODARONE HYDROCHLORIDE 200 MG: 200 TABLET ORAL at 07:55

## 2019-05-27 RX ADMIN — CARBIDOPA AND LEVODOPA 1 TABLET: 25; 100 TABLET, EXTENDED RELEASE ORAL at 20:04

## 2019-05-27 RX ADMIN — LINAGLIPTIN 5 MG: 5 TABLET, FILM COATED ORAL at 07:56

## 2019-05-27 RX ADMIN — MAGNESIUM OXIDE TAB 400 MG (241.3 MG ELEMENTAL MG) 400 MG: 400 (241.3 MG) TAB at 20:04

## 2019-05-27 RX ADMIN — PANTOPRAZOLE SODIUM 40 MG: 40 TABLET, DELAYED RELEASE ORAL at 05:25

## 2019-05-27 RX ADMIN — ATORVASTATIN CALCIUM 20 MG: 20 TABLET, FILM COATED ORAL at 07:55

## 2019-05-27 RX ADMIN — ROPINIROLE HYDROCHLORIDE 2 MG: 1 TABLET, FILM COATED ORAL at 20:03

## 2019-05-27 RX ADMIN — IPRATROPIUM BROMIDE AND ALBUTEROL SULFATE 3 ML: .5; 3 SOLUTION RESPIRATORY (INHALATION) at 09:40

## 2019-05-27 ASSESSMENT — PAIN DESCRIPTION - LOCATION
LOCATION: ARM;SHOULDER
LOCATION: SHOULDER
LOCATION: ARM;SHOULDER
LOCATION: SHOULDER
LOCATION: SHOULDER
LOCATION: ARM;SHOULDER

## 2019-05-27 ASSESSMENT — PAIN DESCRIPTION - ORIENTATION
ORIENTATION: RIGHT

## 2019-05-27 ASSESSMENT — PAIN DESCRIPTION - FREQUENCY
FREQUENCY: CONTINUOUS
FREQUENCY: INTERMITTENT
FREQUENCY: CONTINUOUS
FREQUENCY: INTERMITTENT

## 2019-05-27 ASSESSMENT — PAIN SCALES - GENERAL
PAINLEVEL_OUTOF10: 5
PAINLEVEL_OUTOF10: 4
PAINLEVEL_OUTOF10: 10
PAINLEVEL_OUTOF10: 0
PAINLEVEL_OUTOF10: 10
PAINLEVEL_OUTOF10: 10
PAINLEVEL_OUTOF10: 0
PAINLEVEL_OUTOF10: 10
PAINLEVEL_OUTOF10: 4
PAINLEVEL_OUTOF10: 10
PAINLEVEL_OUTOF10: 0

## 2019-05-27 ASSESSMENT — PAIN DESCRIPTION - ONSET
ONSET: ON-GOING

## 2019-05-27 ASSESSMENT — PAIN DESCRIPTION - PAIN TYPE
TYPE: ACUTE PAIN

## 2019-05-27 ASSESSMENT — PAIN DESCRIPTION - DESCRIPTORS
DESCRIPTORS: ACHING
DESCRIPTORS: DISCOMFORT;ACHING
DESCRIPTORS: ACHING
DESCRIPTORS: ACHING;DISCOMFORT

## 2019-05-27 ASSESSMENT — PAIN - FUNCTIONAL ASSESSMENT: PAIN_FUNCTIONAL_ASSESSMENT: PREVENTS OR INTERFERES SOME ACTIVE ACTIVITIES AND ADLS

## 2019-05-27 ASSESSMENT — PAIN DESCRIPTION - PROGRESSION
CLINICAL_PROGRESSION: GRADUALLY WORSENING
CLINICAL_PROGRESSION: NOT CHANGED

## 2019-05-28 ENCOUNTER — APPOINTMENT (OUTPATIENT)
Dept: GENERAL RADIOLOGY | Age: 74
DRG: 493 | End: 2019-05-28
Payer: COMMERCIAL

## 2019-05-28 ENCOUNTER — ANESTHESIA (OUTPATIENT)
Dept: OPERATING ROOM | Age: 74
DRG: 493 | End: 2019-05-28
Payer: COMMERCIAL

## 2019-05-28 ENCOUNTER — ANESTHESIA EVENT (OUTPATIENT)
Dept: OPERATING ROOM | Age: 74
DRG: 493 | End: 2019-05-28
Payer: COMMERCIAL

## 2019-05-28 VITALS — SYSTOLIC BLOOD PRESSURE: 105 MMHG | TEMPERATURE: 99 F | DIASTOLIC BLOOD PRESSURE: 50 MMHG | OXYGEN SATURATION: 100 %

## 2019-05-28 LAB
EKG ATRIAL RATE: 73 BPM
EKG P AXIS: 56 DEGREES
EKG P-R INTERVAL: 204 MS
EKG Q-T INTERVAL: 406 MS
EKG QRS DURATION: 94 MS
EKG QTC CALCULATION (BAZETT): 447 MS
EKG R AXIS: 2 DEGREES
EKG T AXIS: 70 DEGREES
EKG VENTRICULAR RATE: 73 BPM
GLUCOSE BLD-MCNC: 108 MG/DL (ref 65–105)
GLUCOSE BLD-MCNC: 135 MG/DL (ref 65–105)
GLUCOSE BLD-MCNC: 141 MG/DL (ref 65–105)
GLUCOSE BLD-MCNC: 141 MG/DL (ref 65–105)
GLUCOSE BLD-MCNC: 68 MG/DL (ref 65–105)
GLUCOSE BLD-MCNC: 87 MG/DL (ref 65–105)
HCT VFR BLD CALC: 22.9 % (ref 36.3–47.1)
HCT VFR BLD CALC: 24.5 % (ref 36.3–47.1)
HCT VFR BLD CALC: 25.8 % (ref 36.3–47.1)
HCT VFR BLD CALC: 26.4 % (ref 36.3–47.1)
HEMOGLOBIN: 6.9 G/DL (ref 11.9–15.1)
HEMOGLOBIN: 7.6 G/DL (ref 11.9–15.1)
HEMOGLOBIN: 7.8 G/DL (ref 11.9–15.1)
HEMOGLOBIN: 7.9 G/DL (ref 11.9–15.1)

## 2019-05-28 PROCEDURE — 6360000002 HC RX W HCPCS: Performed by: NURSE PRACTITIONER

## 2019-05-28 PROCEDURE — 7100000001 HC PACU RECOVERY - ADDTL 15 MIN: Performed by: ORTHOPAEDIC SURGERY

## 2019-05-28 PROCEDURE — 2580000003 HC RX 258: Performed by: NURSE PRACTITIONER

## 2019-05-28 PROCEDURE — 36430 TRANSFUSION BLD/BLD COMPNT: CPT

## 2019-05-28 PROCEDURE — 6360000002 HC RX W HCPCS: Performed by: INTERNAL MEDICINE

## 2019-05-28 PROCEDURE — 86900 BLOOD TYPING SEROLOGIC ABO: CPT

## 2019-05-28 PROCEDURE — 86850 RBC ANTIBODY SCREEN: CPT

## 2019-05-28 PROCEDURE — 2580000003 HC RX 258: Performed by: INTERNAL MEDICINE

## 2019-05-28 PROCEDURE — 85018 HEMOGLOBIN: CPT

## 2019-05-28 PROCEDURE — 86901 BLOOD TYPING SEROLOGIC RH(D): CPT

## 2019-05-28 PROCEDURE — 2500000003 HC RX 250 WO HCPCS: Performed by: NURSE ANESTHETIST, CERTIFIED REGISTERED

## 2019-05-28 PROCEDURE — 3600000012 HC SURGERY LEVEL 2 ADDTL 15MIN: Performed by: ORTHOPAEDIC SURGERY

## 2019-05-28 PROCEDURE — 73020 X-RAY EXAM OF SHOULDER: CPT

## 2019-05-28 PROCEDURE — 3700000001 HC ADD 15 MINUTES (ANESTHESIA): Performed by: ORTHOPAEDIC SURGERY

## 2019-05-28 PROCEDURE — 2580000003 HC RX 258: Performed by: NURSE ANESTHETIST, CERTIFIED REGISTERED

## 2019-05-28 PROCEDURE — 2700000000 HC OXYGEN THERAPY PER DAY

## 2019-05-28 PROCEDURE — 6370000000 HC RX 637 (ALT 250 FOR IP): Performed by: NURSE PRACTITIONER

## 2019-05-28 PROCEDURE — 7100000000 HC PACU RECOVERY - FIRST 15 MIN: Performed by: ORTHOPAEDIC SURGERY

## 2019-05-28 PROCEDURE — 2580000003 HC RX 258: Performed by: ANESTHESIOLOGY

## 2019-05-28 PROCEDURE — 99232 SBSQ HOSP IP/OBS MODERATE 35: CPT | Performed by: INTERNAL MEDICINE

## 2019-05-28 PROCEDURE — 6360000002 HC RX W HCPCS: Performed by: NURSE ANESTHETIST, CERTIFIED REGISTERED

## 2019-05-28 PROCEDURE — 73030 X-RAY EXAM OF SHOULDER: CPT

## 2019-05-28 PROCEDURE — 85014 HEMATOCRIT: CPT

## 2019-05-28 PROCEDURE — 3600000002 HC SURGERY LEVEL 2 BASE: Performed by: ORTHOPAEDIC SURGERY

## 2019-05-28 PROCEDURE — 36415 COLL VENOUS BLD VENIPUNCTURE: CPT

## 2019-05-28 PROCEDURE — 86920 COMPATIBILITY TEST SPIN: CPT

## 2019-05-28 PROCEDURE — 6360000002 HC RX W HCPCS: Performed by: ANESTHESIOLOGY

## 2019-05-28 PROCEDURE — P9016 RBC LEUKOCYTES REDUCED: HCPCS

## 2019-05-28 PROCEDURE — 6370000000 HC RX 637 (ALT 250 FOR IP): Performed by: STUDENT IN AN ORGANIZED HEALTH CARE EDUCATION/TRAINING PROGRAM

## 2019-05-28 PROCEDURE — 2709999900 HC NON-CHARGEABLE SUPPLY: Performed by: ORTHOPAEDIC SURGERY

## 2019-05-28 PROCEDURE — 1200000000 HC SEMI PRIVATE

## 2019-05-28 PROCEDURE — 2500000003 HC RX 250 WO HCPCS: Performed by: STUDENT IN AN ORGANIZED HEALTH CARE EDUCATION/TRAINING PROGRAM

## 2019-05-28 PROCEDURE — 3700000000 HC ANESTHESIA ATTENDED CARE: Performed by: ORTHOPAEDIC SURGERY

## 2019-05-28 PROCEDURE — 3209999900 FLUORO FOR SURGICAL PROCEDURES

## 2019-05-28 PROCEDURE — 94640 AIRWAY INHALATION TREATMENT: CPT

## 2019-05-28 PROCEDURE — 82947 ASSAY GLUCOSE BLOOD QUANT: CPT

## 2019-05-28 DEVICE — IMPLANTABLE DEVICE
Type: IMPLANTABLE DEVICE | Site: SHOULDER | Status: NON-FUNCTIONAL
Removed: 2019-07-05

## 2019-05-28 RX ORDER — NEOSTIGMINE METHYLSULFATE 5 MG/5 ML
SYRINGE (ML) INTRAVENOUS PRN
Status: DISCONTINUED | OUTPATIENT
Start: 2019-05-28 | End: 2019-05-28 | Stop reason: SDUPTHER

## 2019-05-28 RX ORDER — LIDOCAINE HYDROCHLORIDE 20 MG/ML
INJECTION, SOLUTION EPIDURAL; INFILTRATION; INTRACAUDAL; PERINEURAL PRN
Status: DISCONTINUED | OUTPATIENT
Start: 2019-05-28 | End: 2019-05-28 | Stop reason: SDUPTHER

## 2019-05-28 RX ORDER — FENTANYL CITRATE 50 UG/ML
50 INJECTION, SOLUTION INTRAMUSCULAR; INTRAVENOUS EVERY 5 MIN PRN
Status: DISCONTINUED | OUTPATIENT
Start: 2019-05-28 | End: 2019-05-28 | Stop reason: HOSPADM

## 2019-05-28 RX ORDER — ROCURONIUM BROMIDE 10 MG/ML
INJECTION, SOLUTION INTRAVENOUS PRN
Status: DISCONTINUED | OUTPATIENT
Start: 2019-05-28 | End: 2019-05-28 | Stop reason: SDUPTHER

## 2019-05-28 RX ORDER — DEXTROSE MONOHYDRATE 50 MG/ML
INJECTION, SOLUTION INTRAVENOUS CONTINUOUS
Status: ACTIVE | OUTPATIENT
Start: 2019-05-28 | End: 2019-05-28

## 2019-05-28 RX ORDER — FENTANYL CITRATE 50 UG/ML
25 INJECTION, SOLUTION INTRAMUSCULAR; INTRAVENOUS EVERY 5 MIN PRN
Status: DISCONTINUED | OUTPATIENT
Start: 2019-05-28 | End: 2019-05-28 | Stop reason: HOSPADM

## 2019-05-28 RX ORDER — PROPOFOL 10 MG/ML
INJECTION, EMULSION INTRAVENOUS PRN
Status: DISCONTINUED | OUTPATIENT
Start: 2019-05-28 | End: 2019-05-28 | Stop reason: SDUPTHER

## 2019-05-28 RX ORDER — GLYCOPYRROLATE 1 MG/5 ML
SYRINGE (ML) INTRAVENOUS PRN
Status: DISCONTINUED | OUTPATIENT
Start: 2019-05-28 | End: 2019-05-28 | Stop reason: SDUPTHER

## 2019-05-28 RX ORDER — FENTANYL CITRATE 50 UG/ML
INJECTION, SOLUTION INTRAMUSCULAR; INTRAVENOUS PRN
Status: DISCONTINUED | OUTPATIENT
Start: 2019-05-28 | End: 2019-05-28 | Stop reason: SDUPTHER

## 2019-05-28 RX ORDER — ONDANSETRON 2 MG/ML
INJECTION INTRAMUSCULAR; INTRAVENOUS PRN
Status: DISCONTINUED | OUTPATIENT
Start: 2019-05-28 | End: 2019-05-28 | Stop reason: SDUPTHER

## 2019-05-28 RX ORDER — OXYCODONE HYDROCHLORIDE AND ACETAMINOPHEN 5; 325 MG/1; MG/1
1 TABLET ORAL
Status: DISCONTINUED | OUTPATIENT
Start: 2019-05-28 | End: 2019-05-28 | Stop reason: HOSPADM

## 2019-05-28 RX ORDER — 0.9 % SODIUM CHLORIDE 0.9 %
250 INTRAVENOUS SOLUTION INTRAVENOUS ONCE
Status: COMPLETED | OUTPATIENT
Start: 2019-05-28 | End: 2019-05-28

## 2019-05-28 RX ORDER — CLINDAMYCIN PHOSPHATE 900 MG/50ML
900 INJECTION INTRAVENOUS
Status: COMPLETED | OUTPATIENT
Start: 2019-05-28 | End: 2019-05-28

## 2019-05-28 RX ORDER — ONDANSETRON 2 MG/ML
4 INJECTION INTRAMUSCULAR; INTRAVENOUS
Status: DISCONTINUED | OUTPATIENT
Start: 2019-05-28 | End: 2019-05-28 | Stop reason: HOSPADM

## 2019-05-28 RX ORDER — PHENYLEPHRINE HCL IN 0.9% NACL 1 MG/10 ML
SYRINGE (ML) INTRAVENOUS PRN
Status: DISCONTINUED | OUTPATIENT
Start: 2019-05-28 | End: 2019-05-28 | Stop reason: SDUPTHER

## 2019-05-28 RX ORDER — SODIUM CHLORIDE 9 MG/ML
INJECTION, SOLUTION INTRAVENOUS CONTINUOUS PRN
Status: DISCONTINUED | OUTPATIENT
Start: 2019-05-28 | End: 2019-05-28 | Stop reason: SDUPTHER

## 2019-05-28 RX ADMIN — ONDANSETRON 4 MG: 2 INJECTION, SOLUTION INTRAMUSCULAR; INTRAVENOUS at 18:40

## 2019-05-28 RX ADMIN — CALCITRIOL 0.25 MCG: 0.25 CAPSULE ORAL at 08:42

## 2019-05-28 RX ADMIN — SODIUM CHLORIDE: 9 INJECTION, SOLUTION INTRAVENOUS at 16:48

## 2019-05-28 RX ADMIN — SENNA 17.2 MG: 8.6 TABLET, COATED ORAL at 21:43

## 2019-05-28 RX ADMIN — Medication 100 MCG: at 16:53

## 2019-05-28 RX ADMIN — IRON SUCROSE 200 MG: 20 INJECTION, SOLUTION INTRAVENOUS at 11:23

## 2019-05-28 RX ADMIN — LIDOCAINE HYDROCHLORIDE 60 MG: 20 INJECTION, SOLUTION EPIDURAL; INFILTRATION; INTRACAUDAL; PERINEURAL at 16:53

## 2019-05-28 RX ADMIN — MORPHINE SULFATE 2 MG: 2 INJECTION, SOLUTION INTRAMUSCULAR; INTRAVENOUS at 11:29

## 2019-05-28 RX ADMIN — Medication 2 PUFF: at 21:23

## 2019-05-28 RX ADMIN — Medication 100 MCG: at 18:10

## 2019-05-28 RX ADMIN — SODIUM CHLORIDE: 9 INJECTION, SOLUTION INTRAVENOUS at 18:42

## 2019-05-28 RX ADMIN — Medication 100 MCG: at 17:31

## 2019-05-28 RX ADMIN — Medication 3 MG: at 18:40

## 2019-05-28 RX ADMIN — OXYCODONE AND ACETAMINOPHEN 1 TABLET: 5; 325 TABLET ORAL at 21:53

## 2019-05-28 RX ADMIN — DEXTROSE MONOHYDRATE 250 ML: 50 INJECTION, SOLUTION INTRAVENOUS at 19:32

## 2019-05-28 RX ADMIN — ROPINIROLE HYDROCHLORIDE 2 MG: 1 TABLET, FILM COATED ORAL at 21:44

## 2019-05-28 RX ADMIN — IPRATROPIUM BROMIDE AND ALBUTEROL SULFATE 3 ML: .5; 3 SOLUTION RESPIRATORY (INHALATION) at 09:08

## 2019-05-28 RX ADMIN — ROPINIROLE HYDROCHLORIDE 2 MG: 1 TABLET, FILM COATED ORAL at 08:42

## 2019-05-28 RX ADMIN — PROPOFOL 100 MG: 10 INJECTION, EMULSION INTRAVENOUS at 16:53

## 2019-05-28 RX ADMIN — MAGNESIUM OXIDE TAB 400 MG (241.3 MG ELEMENTAL MG) 400 MG: 400 (241.3 MG) TAB at 21:44

## 2019-05-28 RX ADMIN — MORPHINE SULFATE 2 MG: 2 INJECTION, SOLUTION INTRAMUSCULAR; INTRAVENOUS at 15:07

## 2019-05-28 RX ADMIN — CARBIDOPA AND LEVODOPA 1 TABLET: 25; 100 TABLET, EXTENDED RELEASE ORAL at 08:42

## 2019-05-28 RX ADMIN — Medication 0.6 MG: at 18:40

## 2019-05-28 RX ADMIN — CARBIDOPA AND LEVODOPA 1 TABLET: 25; 100 TABLET, EXTENDED RELEASE ORAL at 21:44

## 2019-05-28 RX ADMIN — Medication 100 MCG: at 17:41

## 2019-05-28 RX ADMIN — DARBEPOETIN ALFA 40 MCG: 40 INJECTION, SOLUTION INTRAVENOUS; SUBCUTANEOUS at 11:22

## 2019-05-28 RX ADMIN — Medication 100 MCG: at 18:01

## 2019-05-28 RX ADMIN — ROCURONIUM BROMIDE 50 MG: 10 INJECTION, SOLUTION INTRAVENOUS at 16:53

## 2019-05-28 RX ADMIN — METOPROLOL TARTRATE 25 MG: 25 TABLET ORAL at 08:42

## 2019-05-28 RX ADMIN — OXYCODONE AND ACETAMINOPHEN 1 TABLET: 5; 325 TABLET ORAL at 05:02

## 2019-05-28 RX ADMIN — SODIUM CHLORIDE 250 ML: 9 INJECTION, SOLUTION INTRAVENOUS at 03:14

## 2019-05-28 RX ADMIN — PANTOPRAZOLE SODIUM 40 MG: 40 TABLET, DELAYED RELEASE ORAL at 06:07

## 2019-05-28 RX ADMIN — Medication 2 PUFF: at 09:08

## 2019-05-28 RX ADMIN — Medication 100 MCG: at 18:17

## 2019-05-28 RX ADMIN — FENTANYL CITRATE 50 MCG: 50 INJECTION, SOLUTION INTRAMUSCULAR; INTRAVENOUS at 19:13

## 2019-05-28 RX ADMIN — AMIODARONE HYDROCHLORIDE 200 MG: 200 TABLET ORAL at 08:42

## 2019-05-28 RX ADMIN — RASAGILINE 1 MG: 0.5 TABLET ORAL at 08:42

## 2019-05-28 RX ADMIN — MORPHINE SULFATE 2 MG: 2 INJECTION, SOLUTION INTRAMUSCULAR; INTRAVENOUS at 08:42

## 2019-05-28 RX ADMIN — ATORVASTATIN CALCIUM 20 MG: 20 TABLET, FILM COATED ORAL at 08:42

## 2019-05-28 RX ADMIN — CALCITRIOL 0.25 MCG: 0.25 CAPSULE ORAL at 21:44

## 2019-05-28 RX ADMIN — FENTANYL CITRATE 50 MCG: 50 INJECTION, SOLUTION INTRAMUSCULAR; INTRAVENOUS at 19:00

## 2019-05-28 RX ADMIN — CLINDAMYCIN PHOSPHATE 900 MG: 18 INJECTION, SOLUTION INTRAMUSCULAR; INTRAVENOUS at 17:08

## 2019-05-28 ASSESSMENT — PULMONARY FUNCTION TESTS
PIF_VALUE: 24
PIF_VALUE: 26
PIF_VALUE: 29
PIF_VALUE: 28
PIF_VALUE: 22
PIF_VALUE: 28
PIF_VALUE: 30
PIF_VALUE: 30
PIF_VALUE: 29
PIF_VALUE: 25
PIF_VALUE: 29
PIF_VALUE: 30
PIF_VALUE: 28
PIF_VALUE: 1
PIF_VALUE: 28
PIF_VALUE: 22
PIF_VALUE: 28
PIF_VALUE: 30
PIF_VALUE: 24
PIF_VALUE: 26
PIF_VALUE: 29
PIF_VALUE: 28
PIF_VALUE: 29
PIF_VALUE: 28
PIF_VALUE: 16
PIF_VALUE: 24
PIF_VALUE: 29
PIF_VALUE: 19
PIF_VALUE: 23
PIF_VALUE: 28
PIF_VALUE: 28
PIF_VALUE: 26
PIF_VALUE: 28
PIF_VALUE: 30
PIF_VALUE: 28
PIF_VALUE: 30
PIF_VALUE: 22
PIF_VALUE: 28
PIF_VALUE: 29
PIF_VALUE: 28
PIF_VALUE: 24
PIF_VALUE: 28
PIF_VALUE: 24
PIF_VALUE: 23
PIF_VALUE: 28
PIF_VALUE: 23
PIF_VALUE: 1
PIF_VALUE: 29
PIF_VALUE: 9
PIF_VALUE: 25
PIF_VALUE: 28
PIF_VALUE: 29
PIF_VALUE: 26
PIF_VALUE: 28
PIF_VALUE: 27
PIF_VALUE: 24
PIF_VALUE: 23
PIF_VALUE: 28
PIF_VALUE: 29
PIF_VALUE: 29
PIF_VALUE: 28
PIF_VALUE: 30
PIF_VALUE: 2
PIF_VALUE: 29
PIF_VALUE: 30
PIF_VALUE: 26
PIF_VALUE: 29
PIF_VALUE: 1
PIF_VALUE: 28
PIF_VALUE: 25
PIF_VALUE: 29
PIF_VALUE: 26
PIF_VALUE: 29
PIF_VALUE: 23
PIF_VALUE: 25
PIF_VALUE: 28
PIF_VALUE: 23
PIF_VALUE: 23
PIF_VALUE: 28
PIF_VALUE: 28
PIF_VALUE: 3
PIF_VALUE: 29
PIF_VALUE: 29
PIF_VALUE: 23
PIF_VALUE: 28
PIF_VALUE: 20
PIF_VALUE: 28
PIF_VALUE: 31
PIF_VALUE: 31
PIF_VALUE: 16
PIF_VALUE: 28
PIF_VALUE: 24
PIF_VALUE: 30
PIF_VALUE: 28
PIF_VALUE: 29
PIF_VALUE: 28
PIF_VALUE: 23
PIF_VALUE: 28
PIF_VALUE: 29
PIF_VALUE: 24
PIF_VALUE: 28
PIF_VALUE: 30
PIF_VALUE: 22
PIF_VALUE: 30
PIF_VALUE: 28
PIF_VALUE: 29
PIF_VALUE: 29
PIF_VALUE: 1

## 2019-05-28 ASSESSMENT — PAIN DESCRIPTION - ONSET: ONSET: ON-GOING

## 2019-05-28 ASSESSMENT — PAIN DESCRIPTION - PROGRESSION
CLINICAL_PROGRESSION: NOT CHANGED
CLINICAL_PROGRESSION: NOT CHANGED

## 2019-05-28 ASSESSMENT — PAIN SCALES - GENERAL
PAINLEVEL_OUTOF10: 10
PAINLEVEL_OUTOF10: 9
PAINLEVEL_OUTOF10: 10

## 2019-05-28 ASSESSMENT — PAIN DESCRIPTION - DESCRIPTORS: DESCRIPTORS: ACHING

## 2019-05-28 ASSESSMENT — PAIN DESCRIPTION - FREQUENCY: FREQUENCY: INTERMITTENT

## 2019-05-28 ASSESSMENT — PAIN - FUNCTIONAL ASSESSMENT: PAIN_FUNCTIONAL_ASSESSMENT: PREVENTS OR INTERFERES SOME ACTIVE ACTIVITIES AND ADLS

## 2019-05-28 ASSESSMENT — PAIN DESCRIPTION - LOCATION: LOCATION: SHOULDER

## 2019-05-28 ASSESSMENT — PAIN DESCRIPTION - PAIN TYPE: TYPE: ACUTE PAIN;SURGICAL PAIN

## 2019-05-28 ASSESSMENT — PAIN DESCRIPTION - ORIENTATION: ORIENTATION: RIGHT

## 2019-05-28 NOTE — ANESTHESIA PRE PROCEDURE
(with meals) 12/21/17   Carlos HO Blood, DO   vitamin D (CHOLECALCIFEROL) 5000 units CAPS capsule Take 5,000 Units by mouth daily    Historical Provider, MD   rasagiline mesylate 1 MG TABS Take 1 tablet by mouth daily    Historical Provider, MD   Oxygen Concentrator Dx: Pneumonia, use as directed. Patient taking differently: Dx: Pneumonia, use as directed.    ON 24/7 2/10/17   Morro Hill DO       Current medications:    Current Facility-Administered Medications   Medication Dose Route Frequency Provider Last Rate Last Dose    bupivacaine liposome (EXPAREL) 1.3 % injection 133 mg  10 mL Subcutaneous Once Lv Sharma MD        0.9 % sodium chloride infusion   Intravenous Continuous ANSHUL Amin - CNP 50 mL/hr at 05/27/19 2239      morphine (PF) injection 2 mg  2 mg Intravenous Q2H PRN Lillian Zarate APRN - CNP   2 mg at 05/28/19 1507    Or    morphine sulfate (PF) injection 4 mg  4 mg Intravenous Q2H PRN Lillian Zarate APRN - CNP   4 mg at 05/27/19 0428    enoxaparin (LOVENOX) injection 30 mg  30 mg Subcutaneous Daily Arpit Hawkins MD        albuterol (PROVENTIL) nebulizer solution 1.25 mg  1.25 mg Nebulization Q6H PRN Marlanwu Apa, APRN - CNP        ALPRAZolam Haig Toshia) tablet 0.25 mg  0.25 mg Oral Nightly PRN Marye Apa APRN - CNP        amiodarone (CORDARONE) tablet 200 mg  200 mg Oral Daily Marlane Apa, APRN - CNP   200 mg at 05/28/19 8452    aspirin chewable tablet 81 mg  81 mg Oral Daily Marlane Apa, APRN - CNP   81 mg at 05/26/19 1008    atorvastatin (LIPITOR) tablet 20 mg  20 mg Oral Daily Marlane Apa, APRN - CNP   20 mg at 05/28/19 0842    mometasone-formoterol (DULERA) 200-5 MCG/ACT inhaler 2 puff  2 puff Inhalation BID Marlane Apa, APRN - CNP   2 puff at 05/28/19 0908    calcitRIOL (ROCALTROL) capsule 0.25 mcg  0.25 mcg Oral TID Marye Apa, APRN - CNP   0.25 mcg at 05/28/19 8182    calcium citrate (CALCITRATE) tablet 500 mg  500 mg Oral TID Shahnaz Altman, APRN - CNP   500 mg at 05/27/19 2005    carbidopa-levodopa (SINEMET CR)  MG per extended release tablet 1 tablet  1 tablet Oral 4x Daily Shahnaz Anupama, APRN - CNP   1 tablet at 05/28/19 2993    ferrous sulfate EC tablet 325 mg  325 mg Oral BID  Shahnaz Altman, APRN - CNP   325 mg at 05/27/19 1717    ipratropium-albuterol (DUONEB) nebulizer solution 3 mL  3 mL Inhalation TID Shahnaz Anupama, APRN - CNP   3 mL at 05/28/19 0908    magnesium oxide (MAG-OX) tablet 400 mg  400 mg Oral BID Shahnaz Anupama, APRN - CNP   400 mg at 05/27/19 2004    melatonin tablet 3 mg  3 mg Oral Nightly PRN Shahnaz Anupama, APRN - CNP        metoprolol tartrate (LOPRESSOR) tablet 25 mg  25 mg Oral Daily Shahnaz Anupama, APRN - CNP   25 mg at 05/28/19 0842    pantoprazole (PROTONIX) tablet 40 mg  40 mg Oral BID AC Shahnaz Anupama, APRN - CNP   40 mg at 05/28/19 0464    rasagiline (AZILECT) tablet 1 mg  1 mg Oral Daily Shahnaz Anupama, APRN - CNP   1 mg at 05/28/19 0959    rOPINIRole (REQUIP) tablet 2 mg  2 mg Oral TID Shahnaz Anupama, APRN - CNP   2 mg at 05/28/19 5485    senna (SENOKOT) tablet 17.2 mg  2 tablet Oral Nightly Shahnaz Anupama, APRN - CNP   17.2 mg at 05/27/19 2003    vitamin D (CHOLECALCIFEROL) capsule 5,000 Units  5,000 Units Oral Daily Shahnaz Mutter, APRN - CNP   5,000 Units at 05/27/19 0756    insulin lispro (HUMALOG) injection vial 0-6 Units  0-6 Units Subcutaneous TID  Shahnaz Najmater, APRN - CNP        insulin lispro (HUMALOG) injection vial 0-3 Units  0-3 Units Subcutaneous Nightly Shahnaz Najmaspencer, APRN - CNP        sodium chloride flush 0.9 % injection 10 mL  10 mL Intravenous 2 times per day Shahnaz Mutter, APRN - CNP   10 mL at 05/27/19 2005    sodium chloride flush 0.9 % injection 10 mL  10 mL Intravenous PRN ANSHUL Levine CNP        magnesium hydroxide (MILK OF MAGNESIA) 400 MG/5ML suspension 30 mL  30 mL Oral Daily PRN Nancye Lame Deer, APRN - CNP        nicotine (NICODERM CQ) 21 MG/24HR 1 patch  1 patch Transdermal Daily PRN Nancye Lame Deer, APRN - CNP        acetaminophen (TYLENOL) tablet 650 mg  650 mg Oral Q4H PRN Nancye Lame Deer, APRN - CNP        glucose (GLUTOSE) 40 % oral gel 15 g  15 g Oral PRN Nancye Lame Deer, APRN - CNP        dextrose 50 % solution 12.5 g  12.5 g Intravenous PRN Nancye Lame Deer, APRN - CNP        glucagon (rDNA) injection 1 mg  1 mg Intramuscular PRN Nancye Lame Deer, APRN - CNP        dextrose 5 % solution  100 mL/hr Intravenous PRN Nancye Lame Deer, APRN - CNP        ondansetron (ZOFRAN-ODT) disintegrating tablet 4 mg  4 mg Oral Q6H PRN Newton Bernabe MD        Or    ondansetron [facility]) injection 4 mg  4 mg Intravenous Q6H PRN Newton Bernabe MD        linagliptin (TRADJENTA) tablet 5 mg  5 mg Oral Daily Nancye Lame Deer, APRN - CNP   5 mg at 05/27/19 0756    oxyCODONE-acetaminophen (PERCOCET) 5-325 MG per tablet 1 tablet  1 tablet Oral Q6H PRN Nancye Lame Deer, APRN - CNP   1 tablet at 05/28/19 0502       Allergies:     Allergies   Allergen Reactions    Dye [Barium-Containing Compounds] Other (See Comments)     Cause Afib per     Pcn [Penicillins] Itching and Swelling    Bactrim [Sulfamethoxazole-Trimethoprim] Other (See Comments)     Allergic Nephritis       Problem List:    Patient Active Problem List   Diagnosis Code    Controlled type 2 diabetes mellitus with stage 3 chronic kidney disease, without long-term current use of insulin (HCC) E11.22, N18.3    Hyperlipidemia E78.5    Essential hypertension I10    Chronic leg pain M79.606, G89.29    Paroxysmal atrial fibrillation (HCC) I48.0    Asthma J45.909    Gastroesophageal reflux disease without esophagitis K21.9    ECTOR on CPAP G47.33, Z99.89    Type 2 diabetes mellitus without complication, without long-term  Anemia of chronic renal failure, stage 4 (severe) ([facility]) N18.4, D63.1    Traumatic closed displaced fracture of proximal end of right humerus, initial encounter S42.201A    Hyperkalemia E87.5       Past Medical History:        Diagnosis Date    Acute on chronic diastolic congestive heart failure ([facility]     Anesthesia complication     DIFFICULTY WAKING OP    Asthma     Atrial fibrillation ([address] 2013    Cataracts, bilateral     Cellulitis     BILAT LOWER LEGS-PT STATES IS IMPROVING    Cerebral artery occlusion with cerebral infarction [facility])     Last stroke was February 2017 w/no deficits-TOTAL OF 3    Chronic kidney disease 2013    NOT ON DIALYSIS YET    Constipation     CHRONIC    COPD (chronic obstructive pulmonary disease) ([facility] 2008    PT. SEES DR. BECK. Home O2 at 2-3L/NC 24/7.     Difficult intravenous access     VEINS ROLL    Diverticulosis     DM2 (diabetes mellitus, type 2) ([facility] 2008    Full dentures     FULL UPPER ONLY    GERD (gastroesophageal reflux disease) 2008    ON RX    Headache(784.0)     [place] (hard of hearing)     HAS HEARING AIDS BUT DOES NOT WEAR    Hyperlipidemia     Hyperplastic polyp of intestine     Hypertension 2008    Impaired ambulation     USES TRANFER CHAIR WALKER OR CANE    Kidney stone 2018    PRESENTLY-SMALL    Morbid obesity with BMI of 40.0-44.9, adult ([facility] 12/30/2016    ECTOR on CPAP 2008    USES C-PAP NIGHTLY    Osteoarthritis     Parkinson disease ([facility] 2015    Restless leg syndrome 2013    MILD    Spinal stenosis in cervical region 6/2/2013    Type II or unspecified type diabetes mellitus without mention of complication, not stated as uncontrolled     Unspecified sleep apnea     cpap nightly    Urethral caruncle 3/8/2018    Wears glasses        Past Surgical History:        Procedure Laterality Date    APPENDECTOMY      BRONCHOSCOPY  06/05/2018    CERVICAL [address] SURGERY  2012    CERVICAL SPINE SURGERY  5/31/13    posterior c5-t1    COLONOSCOPY      COLONOSCOPY  2016    incomplete was not cleaned out    COLONOSCOPY  2016    COLONOSCOPY  2017     SIGMOID COLON POLYPECTOMY:  HYPERPLASTIC POLYP ,   DIVERTICULOSIS    CYSTORRHAPHY  2018    EYE SURGERY Bilateral 2017    CATARACTS W/ IOL    HERNIA REPAIR  1999    VENTRAL    LAMINECTOMY  2013    Dr. Rodriguez Samples DX W/CELL WASHG 100 [facility] N/A 2018    BRONCHOSCOPY performed by Mt Gupta MD at [facility] FLX W/REMOVAL LESION BY HOT BX FORCEPS N/A 2017    COLONOSCOPY POLYPECTOMY HOT BIOPSY performed by Adelia Juarez MD at [facility] N/A 2018    CYSTOSCOPY performed by [facility]uire MD at [facility]  2016    gastritis, esophagitis,     UPPER GASTROINTESTINAL ENDOSCOPY  2018    with biopsy    UPPER GASTROINTESTINAL ENDOSCOPY N/A 2018    EGD BIOPSY performed by Adelia Juarez MD at [place] [address] History:    Social History     Tobacco Use    Smoking status: Former Smoker     Packs/day: 2.00     Years: 15.00     Pack years: 30.00     Types: Cigarettes     Last attempt to quit: 10/31/1970     Years since quittin.6    Smokeless tobacco: Never Used   Substance Use Topics    Alcohol use:  Yes     Alcohol/week: 1.2 oz     Types: 2 Shots of liquor per week     Comment: 4 DRINKS A YEAR                                Counseling given: Not Answered      Vital Signs (Current):   Vitals:    19 0531 19 0647 19 0749 19 1141   BP: 129/60 (!) 117/37 (!) 125/49 (!) 119/41   Pulse: 91 95 96 74   Resp: 18 16 16 16   Temp: 99.5 °F (37.5 °C) 99.1 °F (37.3 °C) 99.1 °F (37.3 °C) 98.2 °F (36.8 °C)   TempSrc: Oral Oral Oral Oral   SpO2: 100% 98% 97% 94%   Weight:       Height:                                                  BP Readings from Last 3 Encounters:   19 (!) 119/41 hyperlipidemia          Rate: normal                    Neuro/Psych:   (+) CVA:, neuromuscular disease: Parkinson's disease,             GI/Hepatic/Renal:   (+) GERD:,           Endo/Other:    (+) DiabetesType II DM, , .                 Abdominal:           Vascular:                                    Anesthesia Plan      general     ASA 4       Induction: intravenous. Anesthetic plan and risks discussed with patient.                     Bella Reece MD   5/28/2019

## 2019-05-29 LAB
ANION GAP SERPL CALCULATED.3IONS-SCNC: 13 MMOL/L (ref 9–17)
BUN BLDV-MCNC: 43 MG/DL (ref 8–23)
BUN/CREAT BLD: 23 (ref 9–20)
CALCIUM SERPL-MCNC: 8.4 MG/DL (ref 8.6–10.4)
CHLORIDE BLD-SCNC: 100 MMOL/L (ref 98–107)
CO2: 27 MMOL/L (ref 20–31)
CREAT SERPL-MCNC: 1.86 MG/DL (ref 0.5–0.9)
GFR AFRICAN AMERICAN: 32 ML/MIN
GFR NON-AFRICAN AMERICAN: 27 ML/MIN
GFR SERPL CREATININE-BSD FRML MDRD: ABNORMAL ML/MIN/{1.73_M2}
GFR SERPL CREATININE-BSD FRML MDRD: ABNORMAL ML/MIN/{1.73_M2}
GLUCOSE BLD-MCNC: 115 MG/DL (ref 65–105)
GLUCOSE BLD-MCNC: 128 MG/DL (ref 65–105)
GLUCOSE BLD-MCNC: 137 MG/DL (ref 65–105)
GLUCOSE BLD-MCNC: 95 MG/DL (ref 65–105)
GLUCOSE BLD-MCNC: 95 MG/DL (ref 70–99)
HCT VFR BLD CALC: 22.5 % (ref 36.3–47.1)
HCT VFR BLD CALC: 23.7 % (ref 36.3–47.1)
HCT VFR BLD CALC: 24.2 % (ref 36.3–47.1)
HCT VFR BLD CALC: 26.7 % (ref 36.3–47.1)
HEMOGLOBIN: 6.9 G/DL (ref 11.9–15.1)
HEMOGLOBIN: 7.2 G/DL (ref 11.9–15.1)
HEMOGLOBIN: 7.2 G/DL (ref 11.9–15.1)
HEMOGLOBIN: 8 G/DL (ref 11.9–15.1)
PLATELET # BLD: 83 K/UL (ref 138–453)
POTASSIUM SERPL-SCNC: 4.8 MMOL/L (ref 3.7–5.3)
SODIUM BLD-SCNC: 140 MMOL/L (ref 135–144)

## 2019-05-29 PROCEDURE — 97535 SELF CARE MNGMENT TRAINING: CPT

## 2019-05-29 PROCEDURE — 94760 N-INVAS EAR/PLS OXIMETRY 1: CPT

## 2019-05-29 PROCEDURE — 97168 OT RE-EVAL EST PLAN CARE: CPT

## 2019-05-29 PROCEDURE — 0PSC34Z REPOSITION RIGHT HUMERAL HEAD WITH INTERNAL FIXATION DEVICE, PERCUTANEOUS APPROACH: ICD-10-PCS | Performed by: ORTHOPAEDIC SURGERY

## 2019-05-29 PROCEDURE — 2700000000 HC OXYGEN THERAPY PER DAY

## 2019-05-29 PROCEDURE — 97530 THERAPEUTIC ACTIVITIES: CPT

## 2019-05-29 PROCEDURE — 97164 PT RE-EVAL EST PLAN CARE: CPT

## 2019-05-29 PROCEDURE — 82947 ASSAY GLUCOSE BLOOD QUANT: CPT

## 2019-05-29 PROCEDURE — 2580000003 HC RX 258: Performed by: STUDENT IN AN ORGANIZED HEALTH CARE EDUCATION/TRAINING PROGRAM

## 2019-05-29 PROCEDURE — 99232 SBSQ HOSP IP/OBS MODERATE 35: CPT | Performed by: INTERNAL MEDICINE

## 2019-05-29 PROCEDURE — 94640 AIRWAY INHALATION TREATMENT: CPT

## 2019-05-29 PROCEDURE — 6370000000 HC RX 637 (ALT 250 FOR IP): Performed by: STUDENT IN AN ORGANIZED HEALTH CARE EDUCATION/TRAINING PROGRAM

## 2019-05-29 PROCEDURE — 6360000002 HC RX W HCPCS: Performed by: STUDENT IN AN ORGANIZED HEALTH CARE EDUCATION/TRAINING PROGRAM

## 2019-05-29 PROCEDURE — 85014 HEMATOCRIT: CPT

## 2019-05-29 PROCEDURE — 51701 INSERT BLADDER CATHETER: CPT

## 2019-05-29 PROCEDURE — 1200000000 HC SEMI PRIVATE

## 2019-05-29 PROCEDURE — 85018 HEMOGLOBIN: CPT

## 2019-05-29 PROCEDURE — 85049 AUTOMATED PLATELET COUNT: CPT

## 2019-05-29 PROCEDURE — 80048 BASIC METABOLIC PNL TOTAL CA: CPT

## 2019-05-29 PROCEDURE — 97110 THERAPEUTIC EXERCISES: CPT

## 2019-05-29 PROCEDURE — 36415 COLL VENOUS BLD VENIPUNCTURE: CPT

## 2019-05-29 RX ADMIN — Medication 2 PUFF: at 09:43

## 2019-05-29 RX ADMIN — MAGNESIUM OXIDE TAB 400 MG (241.3 MG ELEMENTAL MG) 400 MG: 400 (241.3 MG) TAB at 08:26

## 2019-05-29 RX ADMIN — FERROUS SULFATE TAB EC 325 MG (65 MG FE EQUIVALENT) 325 MG: 325 (65 FE) TABLET DELAYED RESPONSE at 08:26

## 2019-05-29 RX ADMIN — IPRATROPIUM BROMIDE AND ALBUTEROL SULFATE 3 ML: .5; 3 SOLUTION RESPIRATORY (INHALATION) at 14:10

## 2019-05-29 RX ADMIN — OXYCODONE AND ACETAMINOPHEN 1 TABLET: 5; 325 TABLET ORAL at 04:11

## 2019-05-29 RX ADMIN — Medication 10 ML: at 23:40

## 2019-05-29 RX ADMIN — OXYCODONE AND ACETAMINOPHEN 1 TABLET: 5; 325 TABLET ORAL at 16:55

## 2019-05-29 RX ADMIN — CARBIDOPA AND LEVODOPA 1 TABLET: 25; 100 TABLET, EXTENDED RELEASE ORAL at 13:53

## 2019-05-29 RX ADMIN — CARBIDOPA AND LEVODOPA 1 TABLET: 25; 100 TABLET, EXTENDED RELEASE ORAL at 16:37

## 2019-05-29 RX ADMIN — CALCITRIOL 0.25 MCG: 0.25 CAPSULE ORAL at 08:26

## 2019-05-29 RX ADMIN — RASAGILINE 1 MG: 0.5 TABLET ORAL at 08:26

## 2019-05-29 RX ADMIN — CHOLECALCIFEROL CAP 125 MCG (5000 UNIT) 5000 UNITS: 125 CAP at 08:26

## 2019-05-29 RX ADMIN — LINAGLIPTIN 5 MG: 5 TABLET, FILM COATED ORAL at 08:26

## 2019-05-29 RX ADMIN — OXYCODONE AND ACETAMINOPHEN 1 TABLET: 5; 325 TABLET ORAL at 10:26

## 2019-05-29 RX ADMIN — FERROUS SULFATE TAB EC 325 MG (65 MG FE EQUIVALENT) 325 MG: 325 (65 FE) TABLET DELAYED RESPONSE at 16:37

## 2019-05-29 RX ADMIN — ALPRAZOLAM 0.25 MG: 0.25 TABLET ORAL at 20:43

## 2019-05-29 RX ADMIN — CALCITRIOL 0.25 MCG: 0.25 CAPSULE ORAL at 13:52

## 2019-05-29 RX ADMIN — CARBIDOPA AND LEVODOPA 1 TABLET: 25; 100 TABLET, EXTENDED RELEASE ORAL at 08:26

## 2019-05-29 RX ADMIN — ATORVASTATIN CALCIUM 20 MG: 20 TABLET, FILM COATED ORAL at 08:26

## 2019-05-29 RX ADMIN — ROPINIROLE HYDROCHLORIDE 2 MG: 1 TABLET, FILM COATED ORAL at 13:53

## 2019-05-29 RX ADMIN — Medication 10 ML: at 08:27

## 2019-05-29 RX ADMIN — PANTOPRAZOLE SODIUM 40 MG: 40 TABLET, DELAYED RELEASE ORAL at 06:57

## 2019-05-29 RX ADMIN — ENOXAPARIN SODIUM 30 MG: 30 INJECTION SUBCUTANEOUS at 08:26

## 2019-05-29 RX ADMIN — SODIUM CHLORIDE: 9 INJECTION, SOLUTION INTRAVENOUS at 07:28

## 2019-05-29 RX ADMIN — ROPINIROLE HYDROCHLORIDE 2 MG: 1 TABLET, FILM COATED ORAL at 20:43

## 2019-05-29 RX ADMIN — ROPINIROLE HYDROCHLORIDE 2 MG: 1 TABLET, FILM COATED ORAL at 08:26

## 2019-05-29 RX ADMIN — Medication 2 PUFF: at 19:30

## 2019-05-29 RX ADMIN — ASPIRIN 81 MG 81 MG: 81 TABLET ORAL at 08:26

## 2019-05-29 RX ADMIN — CARBIDOPA AND LEVODOPA 1 TABLET: 25; 100 TABLET, EXTENDED RELEASE ORAL at 20:43

## 2019-05-29 RX ADMIN — IPRATROPIUM BROMIDE AND ALBUTEROL SULFATE 3 ML: .5; 3 SOLUTION RESPIRATORY (INHALATION) at 19:30

## 2019-05-29 RX ADMIN — PANTOPRAZOLE SODIUM 40 MG: 40 TABLET, DELAYED RELEASE ORAL at 16:37

## 2019-05-29 RX ADMIN — MAGNESIUM OXIDE TAB 400 MG (241.3 MG ELEMENTAL MG) 400 MG: 400 (241.3 MG) TAB at 20:43

## 2019-05-29 RX ADMIN — CALCITRIOL 0.25 MCG: 0.25 CAPSULE ORAL at 20:43

## 2019-05-29 RX ADMIN — SENNA 17.2 MG: 8.6 TABLET, COATED ORAL at 20:43

## 2019-05-29 RX ADMIN — AMIODARONE HYDROCHLORIDE 200 MG: 200 TABLET ORAL at 08:26

## 2019-05-29 RX ADMIN — OXYCODONE AND ACETAMINOPHEN 1 TABLET: 5; 325 TABLET ORAL at 23:45

## 2019-05-29 RX ADMIN — IPRATROPIUM BROMIDE AND ALBUTEROL SULFATE 3 ML: .5; 3 SOLUTION RESPIRATORY (INHALATION) at 09:43

## 2019-05-29 RX ADMIN — METOPROLOL TARTRATE 25 MG: 25 TABLET ORAL at 08:26

## 2019-05-29 ASSESSMENT — PAIN DESCRIPTION - PROGRESSION
CLINICAL_PROGRESSION: NOT CHANGED

## 2019-05-29 ASSESSMENT — PAIN - FUNCTIONAL ASSESSMENT
PAIN_FUNCTIONAL_ASSESSMENT: PREVENTS OR INTERFERES SOME ACTIVE ACTIVITIES AND ADLS

## 2019-05-29 ASSESSMENT — PAIN DESCRIPTION - PAIN TYPE
TYPE: CHRONIC PAIN
TYPE: ACUTE PAIN;SURGICAL PAIN
TYPE: SURGICAL PAIN;ACUTE PAIN

## 2019-05-29 ASSESSMENT — PAIN DESCRIPTION - DESCRIPTORS
DESCRIPTORS: ACHING;CONSTANT;DULL
DESCRIPTORS: SHARP;ACHING
DESCRIPTORS: ACHING;CONSTANT;DULL
DESCRIPTORS: SHARP;ACHING
DESCRIPTORS: ACHING

## 2019-05-29 ASSESSMENT — PAIN SCALES - GENERAL
PAINLEVEL_OUTOF10: 10
PAINLEVEL_OUTOF10: 9
PAINLEVEL_OUTOF10: 10
PAINLEVEL_OUTOF10: 9
PAINLEVEL_OUTOF10: 10

## 2019-05-29 ASSESSMENT — PAIN DESCRIPTION - ORIENTATION
ORIENTATION: RIGHT

## 2019-05-29 ASSESSMENT — PAIN DESCRIPTION - LOCATION
LOCATION: SHOULDER

## 2019-05-29 ASSESSMENT — PAIN DESCRIPTION - FREQUENCY
FREQUENCY: CONTINUOUS

## 2019-05-29 ASSESSMENT — PAIN DESCRIPTION - ONSET
ONSET: ON-GOING

## 2019-05-29 NOTE — ANESTHESIA POSTPROCEDURE EVALUATION
Department of Anesthesiology  Postprocedure Note    Patient: Bony Trujillo  MRN: [id number]  YOB: 1945  Date of evaluation: 5/28/2019  Time:  9:19 PM     Procedure Summary     Date:  05/28/19 Room / Location:  [facility] OR 05 / [facility]    Anesthesia Start:  1648 Anesthesia Stop:  1851    Procedure:  SHOULDER CLOSED REDUCTION PERCUTANEOUS PINNING RIGHT (Right Shoulder) Diagnosis:  (should fx)    Surgeon:  Leticia Zazueta MD Responsible Provider:  Niki Pascual MD    Anesthesia Type:  general ASA Status:  4          Anesthesia Type: general    Shan Phase I: Shan Score: 9    Shan Phase II:      Last vitals: Reviewed and per EMR flowsheets.        Anesthesia Post Evaluation    Complications: no

## 2019-05-30 ENCOUNTER — APPOINTMENT (OUTPATIENT)
Dept: GENERAL RADIOLOGY | Age: 74
DRG: 493 | End: 2019-05-30
Payer: COMMERCIAL

## 2019-05-30 VITALS
TEMPERATURE: 97.9 F | BODY MASS INDEX: 36.67 KG/M2 | SYSTOLIC BLOOD PRESSURE: 135 MMHG | RESPIRATION RATE: 20 BRPM | HEIGHT: 61 IN | HEART RATE: 93 BPM | WEIGHT: 194.2 LBS | OXYGEN SATURATION: 96 % | DIASTOLIC BLOOD PRESSURE: 52 MMHG

## 2019-05-30 PROBLEM — E87.5 HYPERKALEMIA: Status: RESOLVED | Noted: 2019-05-26 | Resolved: 2019-05-30

## 2019-05-30 PROBLEM — N17.9 ACUTE KIDNEY INJURY (HCC): Status: RESOLVED | Noted: 2018-11-27 | Resolved: 2019-05-30

## 2019-05-30 LAB
ABSOLUTE EOS #: 0.1 K/UL (ref 0–0.44)
ABSOLUTE IMMATURE GRANULOCYTE: 0.21 K/UL (ref 0–0.3)
ABSOLUTE LYMPH #: 0.8 K/UL (ref 1.1–3.7)
ABSOLUTE MONO #: 1 K/UL (ref 0.1–1.2)
ANION GAP SERPL CALCULATED.3IONS-SCNC: 13 MMOL/L (ref 9–17)
BASOPHILS # BLD: 0 % (ref 0–2)
BASOPHILS ABSOLUTE: 0.03 K/UL (ref 0–0.2)
BUN BLDV-MCNC: 39 MG/DL (ref 8–23)
BUN/CREAT BLD: 25 (ref 9–20)
CALCIUM SERPL-MCNC: 8.6 MG/DL (ref 8.6–10.4)
CHLORIDE BLD-SCNC: 97 MMOL/L (ref 98–107)
CO2: 27 MMOL/L (ref 20–31)
CREAT SERPL-MCNC: 1.54 MG/DL (ref 0.5–0.9)
DIFFERENTIAL TYPE: ABNORMAL
EOSINOPHILS RELATIVE PERCENT: 1 % (ref 1–4)
GFR AFRICAN AMERICAN: 40 ML/MIN
GFR NON-AFRICAN AMERICAN: 33 ML/MIN
GFR SERPL CREATININE-BSD FRML MDRD: ABNORMAL ML/MIN/{1.73_M2}
GFR SERPL CREATININE-BSD FRML MDRD: ABNORMAL ML/MIN/{1.73_M2}
GLUCOSE BLD-MCNC: 100 MG/DL (ref 70–99)
GLUCOSE BLD-MCNC: 138 MG/DL (ref 65–105)
GLUCOSE BLD-MCNC: 99 MG/DL (ref 65–105)
HCT VFR BLD CALC: 23.1 % (ref 36.3–47.1)
HCT VFR BLD CALC: 25.5 % (ref 36.3–47.1)
HCT VFR BLD CALC: 27 % (ref 36.3–47.1)
HEMOGLOBIN: 6.8 G/DL (ref 11.9–15.1)
HEMOGLOBIN: 7.9 G/DL (ref 11.9–15.1)
HEMOGLOBIN: 8.2 G/DL (ref 11.9–15.1)
IMMATURE GRANULOCYTES: 2 %
LYMPHOCYTES # BLD: 9 % (ref 24–43)
MCH RBC QN AUTO: 31 PG (ref 25.2–33.5)
MCHC RBC AUTO-ENTMCNC: 31 G/DL (ref 28.4–34.8)
MCV RBC AUTO: 100 FL (ref 82.6–102.9)
MONOCYTES # BLD: 11 % (ref 3–12)
NRBC AUTOMATED: 0 PER 100 WBC
PDW BLD-RTO: 15.8 % (ref 11.8–14.4)
PLATELET # BLD: 96 K/UL (ref 138–453)
PLATELET ESTIMATE: ABNORMAL
PMV BLD AUTO: 11.3 FL (ref 8.1–13.5)
POTASSIUM SERPL-SCNC: 4.7 MMOL/L (ref 3.7–5.3)
RBC # BLD: 2.55 M/UL (ref 3.95–5.11)
RBC # BLD: ABNORMAL 10*6/UL
SEG NEUTROPHILS: 77 % (ref 36–65)
SEGMENTED NEUTROPHILS ABSOLUTE COUNT: 7.04 K/UL (ref 1.5–8.1)
SODIUM BLD-SCNC: 137 MMOL/L (ref 135–144)
WBC # BLD: 9.2 K/UL (ref 3.5–11.3)
WBC # BLD: ABNORMAL 10*3/UL

## 2019-05-30 PROCEDURE — 85014 HEMATOCRIT: CPT

## 2019-05-30 PROCEDURE — 2580000003 HC RX 258: Performed by: STUDENT IN AN ORGANIZED HEALTH CARE EDUCATION/TRAINING PROGRAM

## 2019-05-30 PROCEDURE — 86900 BLOOD TYPING SEROLOGIC ABO: CPT

## 2019-05-30 PROCEDURE — 6370000000 HC RX 637 (ALT 250 FOR IP): Performed by: STUDENT IN AN ORGANIZED HEALTH CARE EDUCATION/TRAINING PROGRAM

## 2019-05-30 PROCEDURE — 36430 TRANSFUSION BLD/BLD COMPNT: CPT

## 2019-05-30 PROCEDURE — P9016 RBC LEUKOCYTES REDUCED: HCPCS

## 2019-05-30 PROCEDURE — 85025 COMPLETE CBC W/AUTO DIFF WBC: CPT

## 2019-05-30 PROCEDURE — 71045 X-RAY EXAM CHEST 1 VIEW: CPT

## 2019-05-30 PROCEDURE — 80048 BASIC METABOLIC PNL TOTAL CA: CPT

## 2019-05-30 PROCEDURE — 82947 ASSAY GLUCOSE BLOOD QUANT: CPT

## 2019-05-30 PROCEDURE — 94640 AIRWAY INHALATION TREATMENT: CPT

## 2019-05-30 PROCEDURE — 36415 COLL VENOUS BLD VENIPUNCTURE: CPT

## 2019-05-30 PROCEDURE — 2580000003 HC RX 258: Performed by: NURSE PRACTITIONER

## 2019-05-30 PROCEDURE — 2700000000 HC OXYGEN THERAPY PER DAY

## 2019-05-30 PROCEDURE — 6370000000 HC RX 637 (ALT 250 FOR IP): Performed by: INTERNAL MEDICINE

## 2019-05-30 PROCEDURE — 99238 HOSP IP/OBS DSCHRG MGMT 30/<: CPT | Performed by: INTERNAL MEDICINE

## 2019-05-30 PROCEDURE — 6360000002 HC RX W HCPCS: Performed by: INTERNAL MEDICINE

## 2019-05-30 PROCEDURE — 6360000002 HC RX W HCPCS: Performed by: NURSE PRACTITIONER

## 2019-05-30 PROCEDURE — 6360000002 HC RX W HCPCS: Performed by: STUDENT IN AN ORGANIZED HEALTH CARE EDUCATION/TRAINING PROGRAM

## 2019-05-30 PROCEDURE — 85018 HEMOGLOBIN: CPT

## 2019-05-30 RX ORDER — DIGOXIN 0.25 MG/ML
250 INJECTION INTRAMUSCULAR; INTRAVENOUS ONCE
Status: COMPLETED | OUTPATIENT
Start: 2019-05-30 | End: 2019-05-30

## 2019-05-30 RX ORDER — METOPROLOL SUCCINATE 25 MG/1
25 TABLET, EXTENDED RELEASE ORAL DAILY
Status: DISCONTINUED | OUTPATIENT
Start: 2019-05-30 | End: 2019-05-30 | Stop reason: HOSPADM

## 2019-05-30 RX ORDER — ALPRAZOLAM 0.25 MG/1
0.25 TABLET ORAL 3 TIMES DAILY PRN
Qty: 10 TABLET | Refills: 0 | Status: ON HOLD | OUTPATIENT
Start: 2019-05-30 | End: 2019-06-07 | Stop reason: HOSPADM

## 2019-05-30 RX ORDER — FUROSEMIDE 10 MG/ML
20 INJECTION INTRAMUSCULAR; INTRAVENOUS ONCE
Status: COMPLETED | OUTPATIENT
Start: 2019-05-30 | End: 2019-05-30

## 2019-05-30 RX ORDER — FUROSEMIDE 20 MG/1
20 TABLET ORAL DAILY
Qty: 60 TABLET | Refills: 3 | Status: SHIPPED | OUTPATIENT
Start: 2019-05-30 | End: 2021-01-01 | Stop reason: SDUPTHER

## 2019-05-30 RX ORDER — 0.9 % SODIUM CHLORIDE 0.9 %
250 INTRAVENOUS SOLUTION INTRAVENOUS ONCE
Status: DISCONTINUED | OUTPATIENT
Start: 2019-05-30 | End: 2019-05-30 | Stop reason: HOSPADM

## 2019-05-30 RX ORDER — OXYCODONE HYDROCHLORIDE AND ACETAMINOPHEN 5; 325 MG/1; MG/1
1 TABLET ORAL EVERY 4 HOURS PRN
Qty: 10 TABLET | Refills: 0 | Status: SHIPPED | OUTPATIENT
Start: 2019-05-30 | End: 2019-06-02

## 2019-05-30 RX ADMIN — OXYCODONE AND ACETAMINOPHEN 1 TABLET: 5; 325 TABLET ORAL at 12:07

## 2019-05-30 RX ADMIN — Medication 10 ML: at 08:39

## 2019-05-30 RX ADMIN — AMIODARONE HYDROCHLORIDE 200 MG: 200 TABLET ORAL at 08:38

## 2019-05-30 RX ADMIN — IPRATROPIUM BROMIDE AND ALBUTEROL SULFATE 3 ML: .5; 3 SOLUTION RESPIRATORY (INHALATION) at 07:53

## 2019-05-30 RX ADMIN — ATORVASTATIN CALCIUM 20 MG: 20 TABLET, FILM COATED ORAL at 08:38

## 2019-05-30 RX ADMIN — METOPROLOL TARTRATE 25 MG: 25 TABLET ORAL at 08:38

## 2019-05-30 RX ADMIN — SODIUM CHLORIDE 250 ML: 9 INJECTION, SOLUTION INTRAVENOUS at 04:31

## 2019-05-30 RX ADMIN — CARBIDOPA AND LEVODOPA 1 TABLET: 25; 100 TABLET, EXTENDED RELEASE ORAL at 08:38

## 2019-05-30 RX ADMIN — FERROUS SULFATE TAB EC 325 MG (65 MG FE EQUIVALENT) 325 MG: 325 (65 FE) TABLET DELAYED RESPONSE at 08:38

## 2019-05-30 RX ADMIN — ASPIRIN 81 MG 81 MG: 81 TABLET ORAL at 08:38

## 2019-05-30 RX ADMIN — ENOXAPARIN SODIUM 30 MG: 30 INJECTION SUBCUTANEOUS at 08:38

## 2019-05-30 RX ADMIN — LINAGLIPTIN 5 MG: 5 TABLET, FILM COATED ORAL at 08:38

## 2019-05-30 RX ADMIN — CALCITRIOL 0.25 MCG: 0.25 CAPSULE ORAL at 08:38

## 2019-05-30 RX ADMIN — ROPINIROLE HYDROCHLORIDE 2 MG: 1 TABLET, FILM COATED ORAL at 13:35

## 2019-05-30 RX ADMIN — CARBIDOPA AND LEVODOPA 1 TABLET: 25; 100 TABLET, EXTENDED RELEASE ORAL at 12:07

## 2019-05-30 RX ADMIN — Medication 2 PUFF: at 07:53

## 2019-05-30 RX ADMIN — CALCITRIOL 0.25 MCG: 0.25 CAPSULE ORAL at 13:35

## 2019-05-30 RX ADMIN — PANTOPRAZOLE SODIUM 40 MG: 40 TABLET, DELAYED RELEASE ORAL at 05:50

## 2019-05-30 RX ADMIN — ROPINIROLE HYDROCHLORIDE 2 MG: 1 TABLET, FILM COATED ORAL at 08:38

## 2019-05-30 RX ADMIN — DIGOXIN 250 MCG: 0.25 INJECTION INTRAMUSCULAR; INTRAVENOUS at 12:07

## 2019-05-30 RX ADMIN — OXYCODONE AND ACETAMINOPHEN 1 TABLET: 5; 325 TABLET ORAL at 05:50

## 2019-05-30 RX ADMIN — METOPROLOL SUCCINATE 25 MG: 25 TABLET, EXTENDED RELEASE ORAL at 12:07

## 2019-05-30 RX ADMIN — CHOLECALCIFEROL CAP 125 MCG (5000 UNIT) 5000 UNITS: 125 CAP at 08:38

## 2019-05-30 RX ADMIN — RASAGILINE 1 MG: 0.5 TABLET ORAL at 08:38

## 2019-05-30 RX ADMIN — ACETAMINOPHEN 650 MG: 325 TABLET ORAL at 08:38

## 2019-05-30 RX ADMIN — FUROSEMIDE 20 MG: 10 INJECTION, SOLUTION INTRAMUSCULAR; INTRAVENOUS at 04:31

## 2019-05-30 RX ADMIN — Medication 10 ML: at 12:07

## 2019-05-30 RX ADMIN — MAGNESIUM OXIDE TAB 400 MG (241.3 MG ELEMENTAL MG) 400 MG: 400 (241.3 MG) TAB at 08:38

## 2019-05-30 ASSESSMENT — PAIN DESCRIPTION - PAIN TYPE
TYPE: SURGICAL PAIN
TYPE: ACUTE PAIN;SURGICAL PAIN
TYPE: ACUTE PAIN

## 2019-05-30 ASSESSMENT — PAIN DESCRIPTION - LOCATION
LOCATION: SHOULDER
LOCATION: SHOULDER

## 2019-05-30 ASSESSMENT — PAIN DESCRIPTION - FREQUENCY
FREQUENCY: CONTINUOUS
FREQUENCY: INTERMITTENT
FREQUENCY: CONTINUOUS

## 2019-05-30 ASSESSMENT — PAIN DESCRIPTION - ONSET
ONSET: ON-GOING
ONSET: ON-GOING
ONSET: GRADUAL

## 2019-05-30 ASSESSMENT — PAIN SCALES - GENERAL
PAINLEVEL_OUTOF10: 2
PAINLEVEL_OUTOF10: 10
PAINLEVEL_OUTOF10: 10
PAINLEVEL_OUTOF10: 3

## 2019-05-30 ASSESSMENT — PAIN - FUNCTIONAL ASSESSMENT
PAIN_FUNCTIONAL_ASSESSMENT: ACTIVITIES ARE NOT PREVENTED
PAIN_FUNCTIONAL_ASSESSMENT: PREVENTS OR INTERFERES SOME ACTIVE ACTIVITIES AND ADLS

## 2019-05-30 ASSESSMENT — PAIN DESCRIPTION - DESCRIPTORS
DESCRIPTORS: HEADACHE
DESCRIPTORS: ACHING;DISCOMFORT
DESCRIPTORS: ACHING

## 2019-05-30 ASSESSMENT — PAIN DESCRIPTION - ORIENTATION
ORIENTATION: RIGHT
ORIENTATION: RIGHT

## 2019-05-30 ASSESSMENT — PAIN DESCRIPTION - PROGRESSION
CLINICAL_PROGRESSION: NOT CHANGED

## 2019-05-31 LAB
ABO/RH: NORMAL
ANTIBODY SCREEN: NEGATIVE
ARM BAND NUMBER: NORMAL
BLD PROD TYP BPU: NORMAL
BLD PROD TYP BPU: NORMAL
CROSSMATCH RESULT: NORMAL
CROSSMATCH RESULT: NORMAL
DISPENSE STATUS BLOOD BANK: NORMAL
DISPENSE STATUS BLOOD BANK: NORMAL
EXPIRATION DATE: NORMAL
TRANSFUSION STATUS: NORMAL
TRANSFUSION STATUS: NORMAL
UNIT DIVISION: 0
UNIT DIVISION: 0
UNIT NUMBER: NORMAL
UNIT NUMBER: NORMAL

## 2019-06-03 ENCOUNTER — TELEPHONE (OUTPATIENT)
Dept: FAMILY MEDICINE CLINIC | Age: 74
End: 2019-06-03

## 2019-06-04 ENCOUNTER — HOSPITAL ENCOUNTER (OUTPATIENT)
Dept: INFUSION THERAPY | Age: 74
End: 2019-06-04

## 2019-06-04 ENCOUNTER — APPOINTMENT (OUTPATIENT)
Dept: GENERAL RADIOLOGY | Age: 74
DRG: 640 | End: 2019-06-04
Payer: COMMERCIAL

## 2019-06-04 ENCOUNTER — APPOINTMENT (OUTPATIENT)
Dept: CT IMAGING | Age: 74
DRG: 640 | End: 2019-06-04
Payer: COMMERCIAL

## 2019-06-04 ENCOUNTER — HOSPITAL ENCOUNTER (OUTPATIENT)
Dept: INFUSION THERAPY | Facility: MEDICAL CENTER | Age: 74
End: 2019-06-04

## 2019-06-04 ENCOUNTER — HOSPITAL ENCOUNTER (INPATIENT)
Age: 74
LOS: 4 days | Discharge: NURSING, REHAB OR LONG-TERM CARE | DRG: 640 | End: 2019-06-08
Attending: EMERGENCY MEDICINE | Admitting: INTERNAL MEDICINE
Payer: COMMERCIAL

## 2019-06-04 PROBLEM — N39.0 UTI (URINARY TRACT INFECTION): Status: ACTIVE | Noted: 2019-06-04

## 2019-06-04 LAB
-: ABNORMAL
ABSOLUTE EOS #: 0.1 K/UL (ref 0–0.44)
ABSOLUTE IMMATURE GRANULOCYTE: 0.7 K/UL (ref 0–0.3)
ABSOLUTE LYMPH #: 1 K/UL (ref 1.1–3.7)
ABSOLUTE MONO #: 0.8 K/UL (ref 0.1–1.2)
ALBUMIN SERPL-MCNC: 3.6 G/DL (ref 3.5–5.2)
ALBUMIN/GLOBULIN RATIO: ABNORMAL (ref 1–2.5)
ALP BLD-CCNC: 75 U/L (ref 35–104)
ALT SERPL-CCNC: <5 U/L (ref 5–33)
AMMONIA: 23 UMOL/L (ref 11–51)
AMORPHOUS: ABNORMAL
ANION GAP SERPL CALCULATED.3IONS-SCNC: 10 MMOL/L (ref 9–17)
AST SERPL-CCNC: 17 U/L
BACTERIA: ABNORMAL
BASOPHILS # BLD: 0 % (ref 0–2)
BASOPHILS ABSOLUTE: 0 K/UL (ref 0–0.2)
BILIRUB SERPL-MCNC: 0.52 MG/DL (ref 0.3–1.2)
BILIRUBIN DIRECT: 0.14 MG/DL
BILIRUBIN URINE: NEGATIVE
BILIRUBIN, INDIRECT: 0.38 MG/DL (ref 0–1)
BNP INTERPRETATION: ABNORMAL
BUN BLDV-MCNC: 57 MG/DL (ref 8–23)
BUN/CREAT BLD: 29 (ref 9–20)
CALCIUM IONIZED: 1.76 MMOL/L (ref 1.13–1.33)
CALCIUM SERPL-MCNC: 13.4 MG/DL (ref 8.6–10.4)
CASTS UA: ABNORMAL /LPF
CHLORIDE BLD-SCNC: 94 MMOL/L (ref 98–107)
CO2: 37 MMOL/L (ref 20–31)
COLOR: YELLOW
COMMENT UA: ABNORMAL
CREAT SERPL-MCNC: 1.97 MG/DL (ref 0.5–0.9)
CRYSTALS, UA: ABNORMAL /HPF
DIFFERENTIAL TYPE: ABNORMAL
EOSINOPHILS RELATIVE PERCENT: 1 % (ref 1–4)
EPITHELIAL CELLS UA: ABNORMAL /HPF (ref 0–5)
FIO2: 36
GFR AFRICAN AMERICAN: 30 ML/MIN
GFR NON-AFRICAN AMERICAN: 25 ML/MIN
GFR SERPL CREATININE-BSD FRML MDRD: ABNORMAL ML/MIN/{1.73_M2}
GFR SERPL CREATININE-BSD FRML MDRD: ABNORMAL ML/MIN/{1.73_M2}
GLOBULIN: ABNORMAL G/DL (ref 1.5–3.8)
GLUCOSE BLD-MCNC: 100 MG/DL (ref 65–105)
GLUCOSE BLD-MCNC: 104 MG/DL (ref 70–99)
GLUCOSE BLD-MCNC: 125 MG/DL (ref 65–105)
GLUCOSE URINE: NEGATIVE
HCT VFR BLD CALC: 29.4 % (ref 36.3–47.1)
HEMOGLOBIN: 8.8 G/DL (ref 11.9–15.1)
IMMATURE GRANULOCYTES: 7 %
KETONES, URINE: NEGATIVE
LACTIC ACID: 1.1 MMOL/L (ref 0.5–2.2)
LEUKOCYTE ESTERASE, URINE: ABNORMAL
LYMPHOCYTES # BLD: 10 % (ref 24–43)
MAGNESIUM: 2.1 MG/DL (ref 1.6–2.6)
MCH RBC QN AUTO: 31.1 PG (ref 25.2–33.5)
MCHC RBC AUTO-ENTMCNC: 29.9 G/DL (ref 28.4–34.8)
MCV RBC AUTO: 103.9 FL (ref 82.6–102.9)
MONOCYTES # BLD: 8 % (ref 3–12)
MORPHOLOGY: ABNORMAL
MUCUS: ABNORMAL
MYOGLOBIN: 198 NG/ML (ref 25–58)
NEGATIVE BASE EXCESS, ART: ABNORMAL (ref 0–2)
NITRITE, URINE: NEGATIVE
NRBC AUTOMATED: 0 PER 100 WBC
O2 DEVICE/FLOW/%: ABNORMAL
OTHER OBSERVATIONS UA: ABNORMAL
PATIENT TEMP: 37
PDW BLD-RTO: 14.9 % (ref 11.8–14.4)
PH UA: 6 (ref 5–8)
PLATELET # BLD: 220 K/UL (ref 138–453)
PLATELET ESTIMATE: ABNORMAL
PMV BLD AUTO: 10.4 FL (ref 8.1–13.5)
POC HCO3: 38.8 MMOL/L (ref 22–27)
POC O2 SATURATION: 99 %
POC PCO2 TEMP: ABNORMAL MM HG
POC PCO2: 50 MM HG (ref 32–45)
POC PH TEMP: ABNORMAL
POC PH: 7.5 (ref 7.35–7.45)
POC PO2 TEMP: ABNORMAL MM HG
POC PO2: 151 MM HG (ref 75–95)
POSITIVE BASE EXCESS, ART: 16 (ref 0–2)
POTASSIUM SERPL-SCNC: 4.4 MMOL/L (ref 3.7–5.3)
PRO-BNP: 1240 PG/ML
PROTEIN UA: NEGATIVE
RBC # BLD: 2.83 M/UL (ref 3.95–5.11)
RBC # BLD: ABNORMAL 10*6/UL
RBC UA: ABNORMAL /HPF (ref 0–2)
RENAL EPITHELIAL, UA: ABNORMAL /HPF
SEG NEUTROPHILS: 74 % (ref 36–65)
SEGMENTED NEUTROPHILS ABSOLUTE COUNT: 7.4 K/UL (ref 1.5–8.1)
SODIUM BLD-SCNC: 141 MMOL/L (ref 135–144)
SPECIFIC GRAVITY UA: 1.01 (ref 1–1.03)
TCO2 (CALC), ART: 40 MMOL/L (ref 23–28)
TOTAL PROTEIN: 7 G/DL (ref 6.4–8.3)
TRICHOMONAS: ABNORMAL
TROPONIN INTERP: ABNORMAL
TROPONIN INTERP: ABNORMAL
TROPONIN T: ABNORMAL NG/ML
TROPONIN T: ABNORMAL NG/ML
TROPONIN, HIGH SENSITIVITY: 33 NG/L (ref 0–14)
TROPONIN, HIGH SENSITIVITY: 36 NG/L (ref 0–14)
TURBIDITY: ABNORMAL
URINE HGB: ABNORMAL
UROBILINOGEN, URINE: NORMAL
WBC # BLD: 10 K/UL (ref 3.5–11.3)
WBC # BLD: ABNORMAL 10*3/UL
WBC UA: ABNORMAL /HPF (ref 0–5)
YEAST: ABNORMAL

## 2019-06-04 PROCEDURE — 99223 1ST HOSP IP/OBS HIGH 75: CPT | Performed by: NURSE PRACTITIONER

## 2019-06-04 PROCEDURE — 84484 ASSAY OF TROPONIN QUANT: CPT

## 2019-06-04 PROCEDURE — 82947 ASSAY GLUCOSE BLOOD QUANT: CPT

## 2019-06-04 PROCEDURE — 87040 BLOOD CULTURE FOR BACTERIA: CPT

## 2019-06-04 PROCEDURE — 83880 ASSAY OF NATRIURETIC PEPTIDE: CPT

## 2019-06-04 PROCEDURE — 83735 ASSAY OF MAGNESIUM: CPT

## 2019-06-04 PROCEDURE — 82140 ASSAY OF AMMONIA: CPT

## 2019-06-04 PROCEDURE — 83874 ASSAY OF MYOGLOBIN: CPT

## 2019-06-04 PROCEDURE — 96365 THER/PROPH/DIAG IV INF INIT: CPT

## 2019-06-04 PROCEDURE — 93005 ELECTROCARDIOGRAM TRACING: CPT | Performed by: NURSE PRACTITIONER

## 2019-06-04 PROCEDURE — 81001 URINALYSIS AUTO W/SCOPE: CPT

## 2019-06-04 PROCEDURE — 87086 URINE CULTURE/COLONY COUNT: CPT

## 2019-06-04 PROCEDURE — 80048 BASIC METABOLIC PNL TOTAL CA: CPT

## 2019-06-04 PROCEDURE — 71045 X-RAY EXAM CHEST 1 VIEW: CPT

## 2019-06-04 PROCEDURE — 1200000000 HC SEMI PRIVATE

## 2019-06-04 PROCEDURE — 82803 BLOOD GASES ANY COMBINATION: CPT

## 2019-06-04 PROCEDURE — 2580000003 HC RX 258: Performed by: NURSE PRACTITIONER

## 2019-06-04 PROCEDURE — 70450 CT HEAD/BRAIN W/O DYE: CPT

## 2019-06-04 PROCEDURE — 99285 EMERGENCY DEPT VISIT HI MDM: CPT

## 2019-06-04 PROCEDURE — 82330 ASSAY OF CALCIUM: CPT

## 2019-06-04 PROCEDURE — 6360000002 HC RX W HCPCS: Performed by: NURSE PRACTITIONER

## 2019-06-04 PROCEDURE — 36415 COLL VENOUS BLD VENIPUNCTURE: CPT

## 2019-06-04 PROCEDURE — 51701 INSERT BLADDER CATHETER: CPT

## 2019-06-04 PROCEDURE — 83605 ASSAY OF LACTIC ACID: CPT

## 2019-06-04 PROCEDURE — 85025 COMPLETE CBC W/AUTO DIFF WBC: CPT

## 2019-06-04 PROCEDURE — 80076 HEPATIC FUNCTION PANEL: CPT

## 2019-06-04 PROCEDURE — 36600 WITHDRAWAL OF ARTERIAL BLOOD: CPT

## 2019-06-04 RX ORDER — CYCLOBENZAPRINE HCL 5 MG
5 TABLET ORAL 3 TIMES DAILY PRN
Status: ON HOLD | COMMUNITY
End: 2020-02-29

## 2019-06-04 RX ORDER — OXYCODONE HYDROCHLORIDE AND ACETAMINOPHEN 5; 325 MG/1; MG/1
1 TABLET ORAL EVERY 4 HOURS PRN
Status: ON HOLD | COMMUNITY
End: 2019-06-07 | Stop reason: HOSPADM

## 2019-06-04 RX ORDER — SODIUM CHLORIDE 9 MG/ML
INJECTION, SOLUTION INTRAVENOUS CONTINUOUS
Status: DISCONTINUED | OUTPATIENT
Start: 2019-06-04 | End: 2019-06-05 | Stop reason: SDUPTHER

## 2019-06-04 RX ORDER — 0.9 % SODIUM CHLORIDE 0.9 %
1000 INTRAVENOUS SOLUTION INTRAVENOUS ONCE
Status: COMPLETED | OUTPATIENT
Start: 2019-06-04 | End: 2019-06-04

## 2019-06-04 RX ORDER — PRAMIPEXOLE DIHYDROCHLORIDE 0.5 MG/1
0.5 TABLET ORAL 3 TIMES DAILY
COMMUNITY
End: 2019-07-03 | Stop reason: ALTCHOICE

## 2019-06-04 RX ORDER — BUDESONIDE AND FORMOTEROL FUMARATE DIHYDRATE 80; 4.5 UG/1; UG/1
2 AEROSOL RESPIRATORY (INHALATION) 2 TIMES DAILY
Status: ON HOLD | COMMUNITY
End: 2019-12-06 | Stop reason: ALTCHOICE

## 2019-06-04 RX ADMIN — CEFTRIAXONE SODIUM 1 G: 1 INJECTION, POWDER, FOR SOLUTION INTRAMUSCULAR; INTRAVENOUS at 20:28

## 2019-06-04 RX ADMIN — SODIUM CHLORIDE 1000 ML: 9 INJECTION, SOLUTION INTRAVENOUS at 20:28

## 2019-06-04 ASSESSMENT — ENCOUNTER SYMPTOMS
SORE THROAT: 0
TROUBLE SWALLOWING: 0
EYE PAIN: 0
ABDOMINAL PAIN: 0
COLOR CHANGE: 0
SHORTNESS OF BREATH: 0
BACK PAIN: 0
NAUSEA: 0
DIARRHEA: 0
SINUS PRESSURE: 0
VOMITING: 0
RHINORRHEA: 0
PHOTOPHOBIA: 0
COUGH: 0
VOICE CHANGE: 0

## 2019-06-04 ASSESSMENT — PAIN SCALES - GENERAL: PAINLEVEL_OUTOF10: 10

## 2019-06-04 NOTE — ED PROVIDER NOTES
tablets by mouth nightly     VITAMIN D (CHOLECALCIFEROL) 5000 UNITS CAPS CAPSULE    Take 5,000 Units by mouth daily       PAST MEDICAL HISTORY         Diagnosis Date    [facility] on chronic diastolic [facility]ive [facility] failure [facility]     Anesthesia complication     DIFFICULTY WAKING OP    Asthma     Atrial fibrillation ([address] 2013    Cataracts, bilateral     Cellulitis     BILAT LOWER LEGS-PT STATES IS IMPROVING    Cerebral artery occlusion with cerebral infarction [facility])     Last stroke was February 2017 w/no deficits-TOTAL OF 3    Chronic kidney disease 2013    NOT ON DIALYSIS YET    Constipation     CHRONIC    COPD (chronic obstructive pulmonary disease) ([address] 2008    PT. SEES DR. BECK. Home O2 at 2-3L/NC 24/7.     Difficult intravenous access     VEINS ROLL    Diverticulosis     DM2 (diabetes mellitus, type 2) ([facility] 2008    Full dentures     FULL UPPER ONLY    GERD (gastroesophageal reflux disease) 2008    ON RX    Headache(784.0)     [place] (hard of hearing)     HAS HEARING AIDS BUT DOES NOT WEAR    Hyperlipidemia     Hyperplastic polyp of intestine     Hypertension 2008    Impaired ambulation     USES TRANFER CHAIR WALKER OR CANE    Kidney stone 2018    PRESENTLY-SMALL    Morbid obesity with BMI of 40.0-44.9, adult ([address] 12/30/2016    ECTOR on CPAP 2008    USES C-PAP NIGHTLY    Osteoarthritis     Parkinson disease ([facility] 2015    Restless leg syndrome 2013    MILD    Spinal stenosis in cervical region 6/2/2013    Type II or unspecified type diabetes mellitus without mention of complication, not stated as uncontrolled     Unspecified sleep apnea     cpap nightly    Urethral caruncle 3/8/2018    Wears glasses        SURGICAL HISTORY           Procedure Laterality Date    APPENDECTOMY      BRONCHOSCOPY  06/05/2018    CERVICAL [address] SURGERY  2012    CERVICAL SPINE SURGERY  5/31/13    posterior c5-t1    COLONOSCOPY  2009    COLONOSCOPY  12/29/2016    incomplete was not cleaned out    COLONOSCOPY  2016    COLONOSCOPY  2017     SIGMOID COLON POLYPECTOMY:  HYPERPLASTIC POLYP ,   DIVERTICULOSIS    CYSTORRHAPHY  2018    EYE SURGERY Bilateral 2017    CATARACTS W/ IOL    HERNIA REPAIR  1999    VENTRAL    LAMINECTOMY  2013    Dr. Gerber Truong DX W/CELL WASHG 100 [facility] N/A 2018    BRONCHOSCOPY performed by Ria Holloway MD at [address] N/A 2017    COLONOSCOPY POLYPECTOMY HOT BIOPSY performed by Sherwin Singletary MD at [facility] N/A 2018    CYSTOSCOPY performed by Jocelyne Echevarria MD at [address] Right 2019    SHOULDER OPEN REDUCTION INTERNAL FIXATION PERCUTANEOUS PINNING & CLOSED REDUCTION (Right Shoulder    SHOULDER FRACTURE SURGERY Right 2019    SHOULDER CLOSED REDUCTION PERCUTANEOUS PINNING RIGHT [facility]  2016    gastritis, esophagitis,     UPPER GASTROINTESTINAL ENDOSCOPY  2018    with biopsy    UPPER GASTROINTESTINAL ENDOSCOPY N/A 2018    EGD BIOPSY performed by Sherwin Singletary MD at [place] [address]           Problem Relation Age of Onset    Heart Failure Mother     Hypertension Mother     Heart Disease Mother     High Blood Pressure Mother      Family Status   Relation Name Status    Mother      Father      MGM      MGF      [facility]      PGF          SOCIAL HISTORY      reports that she quit smoking about 48 years ago. Her smoking use included cigarettes. She has a 30.00 pack-year smoking history. She has never used smokeless tobacco. She reports that she drinks about 1.2 oz of alcohol per week. She reports that she does not use drugs.     REVIEW OF SYSTEMS    (2-9 systems for level 4, 10 or more for level 5)     Review of Systems   Constitutional: Positive for activity change and fatigue. Negative for chills, diaphoresis and fever. HENT: Negative for congestion, ear discharge, ear pain, postnasal drip, rhinorrhea, sinus pressure, sore throat, trouble swallowing and voice change. Eyes: Negative for photophobia, pain and visual disturbance. Respiratory: Negative for cough and shortness of breath. Cardiovascular: Negative for chest pain and palpitations. Gastrointestinal: Negative for abdominal pain, diarrhea, nausea and vomiting. Genitourinary: Negative for dysuria, frequency, hematuria and urgency. Musculoskeletal: Positive for arthralgias and myalgias. Negative for back pain, neck pain and neck stiffness. Skin: Positive for wound. Negative for color change and rash. Neurological: Positive for weakness. Negative for dizziness, syncope, facial asymmetry, speech difficulty, light-headedness, numbness and headaches. Psychiatric/Behavioral: Positive for confusion. Except as noted above the remainder of the review of systems was reviewed and negative. PHYSICAL EXAM    (up to 7 for level 4, 8 or more for level 5)     ED Triage Vitals [06/04/19 1743]   BP Temp Temp Source Pulse Resp SpO2 Height Weight   (!) 133/52 97.6 °F (36.4 °C) Oral 70 20 100 % 5' (1.524 m) 180 lb (81.6 kg)     Physical Exam   Constitutional: She is oriented to person, place, and time. She appears well-developed and well-nourished. No distress. HENT:   Right Ear: External ear normal.   Left Ear: External ear normal.   Mouth/Throat: Oropharynx is clear and moist.   Eyes: Pupils are equal, round, and reactive to light. EOM are normal. Right conjunctiva is injected. Right conjunctiva has no hemorrhage. Left conjunctiva is not injected. Left conjunctiva has no hemorrhage. Cardiovascular: Normal rate, regular rhythm and normal heart sounds. Pulmonary/Chest: Effort normal and breath sounds normal. No respiratory distress. She has no wheezes. differentiation is maintained without evidence of an acute infarct. There is no evidence of hydrocephalus. Extensive calcifications in bilateral basal ganglia, bilateral caudate heads, thalamus, bilateral cerebral and cerebellar hemispheres similar to multiple prior exams. ORBITS: The visualized portion of the orbits demonstrate no acute abnormality. SINUSES: Right maxillary sinusitis. Fluid in bilateral mastoids. SOFT TISSUES/SKULL:  No acute abnormality of the visualized skull or soft tissues. No acute intracranial abnormality. No change from prior exams. Right maxillary sinusitis. Fluid in bilateral mastoids. Xr Chest Portable    Result Date: 6/4/2019  EXAMINATION: ONE XRAY VIEW OF THE CHEST 6/4/2019 5:51 pm COMPARISON: Chest radiograph dated 05/30/2019 HISTORY: ORDERING SYSTEM PROVIDED HISTORY: weakness TECHNOLOGIST PROVIDED HISTORY: weakness Ordering Physician Provided Reason for Exam: weakness Acuity: Acute Type of Exam: Initial Relevant Medical/Surgical History: COPD FINDINGS: Cardiac and mediastinal shadows are normal.  No infiltrates. No effusions. No pneumothorax. No free air below the diaphragm.      No acute findings in the chest.       LABS:  Labs Reviewed   BASIC METABOLIC PANEL - Abnormal; Notable for the following components:       Result Value    Glucose 104 (*)     BUN 57 (*)     CREATININE 1.97 (*)     Bun/Cre Ratio 29 (*)     Calcium 13.4 (*)     Chloride 94 (*)     CO2 37 (*)     GFR Non- 25 (*)     GFR  30 (*)     All other components within normal limits   CBC WITH AUTO DIFFERENTIAL - Abnormal; Notable for the following components:    RBC 2.83 (*)     Hemoglobin 8.8 (*)     Hematocrit 29.4 (*)     .9 (*)     RDW 14.9 (*)     Seg Neutrophils 74 (*)     Lymphocytes 10 (*)     Immature Granulocytes 7 (*)     Absolute Lymph # 1.00 (*)     Absolute Immature Granulocyte 0.70 (*)     All other components within normal limits   BRAIN NATRIURETIC

## 2019-06-04 NOTE — ED NOTES
Bed: 15  Expected date: 6/4/19  Expected time: 5:17 PM  Means of arrival:   Comments:  [facility]  06/04/19 3339

## 2019-06-05 PROBLEM — G93.41 METABOLIC ENCEPHALOPATHY: Status: ACTIVE | Noted: 2019-06-05

## 2019-06-05 LAB
ALBUMIN SERPL-MCNC: 3.2 G/DL (ref 3.5–5.2)
ALBUMIN/GLOBULIN RATIO: ABNORMAL (ref 1–2.5)
ALP BLD-CCNC: 67 U/L (ref 35–104)
ALT SERPL-CCNC: <5 U/L (ref 5–33)
ANION GAP SERPL CALCULATED.3IONS-SCNC: 9 MMOL/L (ref 9–17)
AST SERPL-CCNC: 17 U/L
BILIRUB SERPL-MCNC: 0.43 MG/DL (ref 0.3–1.2)
BUN BLDV-MCNC: 52 MG/DL (ref 8–23)
BUN/CREAT BLD: 28 (ref 9–20)
CALCIUM SERPL-MCNC: 12.1 MG/DL (ref 8.6–10.4)
CHLORIDE BLD-SCNC: 99 MMOL/L (ref 98–107)
CO2: 34 MMOL/L (ref 20–31)
CREAT SERPL-MCNC: 1.83 MG/DL (ref 0.5–0.9)
CULTURE: NO GROWTH
EKG ATRIAL RATE: 111 BPM
EKG Q-T INTERVAL: 424 MS
EKG QRS DURATION: 100 MS
EKG QTC CALCULATION (BAZETT): 444 MS
EKG R AXIS: 1 DEGREES
EKG T AXIS: 77 DEGREES
EKG VENTRICULAR RATE: 66 BPM
GFR AFRICAN AMERICAN: 33 ML/MIN
GFR NON-AFRICAN AMERICAN: 27 ML/MIN
GFR SERPL CREATININE-BSD FRML MDRD: ABNORMAL ML/MIN/{1.73_M2}
GFR SERPL CREATININE-BSD FRML MDRD: ABNORMAL ML/MIN/{1.73_M2}
GLUCOSE BLD-MCNC: 106 MG/DL (ref 70–99)
GLUCOSE BLD-MCNC: 111 MG/DL (ref 65–105)
GLUCOSE BLD-MCNC: 74 MG/DL (ref 65–105)
GLUCOSE BLD-MCNC: 89 MG/DL (ref 65–105)
GLUCOSE BLD-MCNC: 98 MG/DL (ref 65–105)
HCT VFR BLD CALC: 26.8 % (ref 36.3–47.1)
HEMOGLOBIN: 8 G/DL (ref 11.9–15.1)
INR BLD: 1
LACTIC ACID, SEPSIS WHOLE BLOOD: NORMAL MMOL/L (ref 0.5–1.9)
LACTIC ACID, SEPSIS WHOLE BLOOD: NORMAL MMOL/L (ref 0.5–1.9)
LACTIC ACID, SEPSIS: 0.5 MMOL/L (ref 0.5–1.9)
LACTIC ACID, SEPSIS: 0.6 MMOL/L (ref 0.5–1.9)
Lab: NORMAL
MCH RBC QN AUTO: 30.8 PG (ref 25.2–33.5)
MCHC RBC AUTO-ENTMCNC: 29.9 G/DL (ref 28.4–34.8)
MCV RBC AUTO: 103.1 FL (ref 82.6–102.9)
NRBC AUTOMATED: 0 PER 100 WBC
PDW BLD-RTO: 15.1 % (ref 11.8–14.4)
PLATELET # BLD: 206 K/UL (ref 138–453)
PMV BLD AUTO: 10.4 FL (ref 8.1–13.5)
POTASSIUM SERPL-SCNC: 4.5 MMOL/L (ref 3.7–5.3)
PROTHROMBIN TIME: 10.5 SEC (ref 9.7–11.6)
PTH INTACT: 2.71 PG/ML (ref 15–65)
RBC # BLD: 2.6 M/UL (ref 3.95–5.11)
SODIUM BLD-SCNC: 142 MMOL/L (ref 135–144)
SPECIMEN DESCRIPTION: NORMAL
TOTAL PROTEIN: 6.3 G/DL (ref 6.4–8.3)
WBC # BLD: 9.7 K/UL (ref 3.5–11.3)

## 2019-06-05 PROCEDURE — 80053 COMPREHEN METABOLIC PANEL: CPT

## 2019-06-05 PROCEDURE — 82947 ASSAY GLUCOSE BLOOD QUANT: CPT

## 2019-06-05 PROCEDURE — 83605 ASSAY OF LACTIC ACID: CPT

## 2019-06-05 PROCEDURE — 6360000002 HC RX W HCPCS: Performed by: INTERNAL MEDICINE

## 2019-06-05 PROCEDURE — 99213 OFFICE O/P EST LOW 20 MIN: CPT

## 2019-06-05 PROCEDURE — 6370000000 HC RX 637 (ALT 250 FOR IP): Performed by: INTERNAL MEDICINE

## 2019-06-05 PROCEDURE — 94761 N-INVAS EAR/PLS OXIMETRY MLT: CPT

## 2019-06-05 PROCEDURE — 6360000002 HC RX W HCPCS: Performed by: NURSE PRACTITIONER

## 2019-06-05 PROCEDURE — 2580000003 HC RX 258: Performed by: INTERNAL MEDICINE

## 2019-06-05 PROCEDURE — 94640 AIRWAY INHALATION TREATMENT: CPT

## 2019-06-05 PROCEDURE — 97530 THERAPEUTIC ACTIVITIES: CPT

## 2019-06-05 PROCEDURE — 97167 OT EVAL HIGH COMPLEX 60 MIN: CPT

## 2019-06-05 PROCEDURE — 83970 ASSAY OF PARATHORMONE: CPT

## 2019-06-05 PROCEDURE — 97163 PT EVAL HIGH COMPLEX 45 MIN: CPT

## 2019-06-05 PROCEDURE — 99232 SBSQ HOSP IP/OBS MODERATE 35: CPT | Performed by: INTERNAL MEDICINE

## 2019-06-05 PROCEDURE — 36415 COLL VENOUS BLD VENIPUNCTURE: CPT

## 2019-06-05 PROCEDURE — 6370000000 HC RX 637 (ALT 250 FOR IP): Performed by: NURSE PRACTITIONER

## 2019-06-05 PROCEDURE — 1200000000 HC SEMI PRIVATE

## 2019-06-05 PROCEDURE — 97535 SELF CARE MNGMENT TRAINING: CPT

## 2019-06-05 PROCEDURE — 2700000000 HC OXYGEN THERAPY PER DAY

## 2019-06-05 PROCEDURE — 85610 PROTHROMBIN TIME: CPT

## 2019-06-05 PROCEDURE — 85027 COMPLETE CBC AUTOMATED: CPT

## 2019-06-05 RX ORDER — ONDANSETRON 2 MG/ML
4 INJECTION INTRAMUSCULAR; INTRAVENOUS EVERY 6 HOURS PRN
Status: DISCONTINUED | OUTPATIENT
Start: 2019-06-05 | End: 2019-06-05 | Stop reason: SDUPTHER

## 2019-06-05 RX ORDER — ROPINIROLE 1 MG/1
2 TABLET, FILM COATED ORAL 3 TIMES DAILY
Status: DISCONTINUED | OUTPATIENT
Start: 2019-06-05 | End: 2019-06-08 | Stop reason: HOSPADM

## 2019-06-05 RX ORDER — PANTOPRAZOLE SODIUM 40 MG/1
40 TABLET, DELAYED RELEASE ORAL
Status: DISCONTINUED | OUTPATIENT
Start: 2019-06-05 | End: 2019-06-08 | Stop reason: HOSPADM

## 2019-06-05 RX ORDER — PRAMIPEXOLE DIHYDROCHLORIDE 0.25 MG/1
0.5 TABLET ORAL 3 TIMES DAILY
Status: DISCONTINUED | OUTPATIENT
Start: 2019-06-05 | End: 2019-06-08 | Stop reason: HOSPADM

## 2019-06-05 RX ORDER — SENNA PLUS 8.6 MG/1
2 TABLET ORAL NIGHTLY
Status: DISCONTINUED | OUTPATIENT
Start: 2019-06-05 | End: 2019-06-08 | Stop reason: HOSPADM

## 2019-06-05 RX ORDER — SODIUM CHLORIDE 0.9 % (FLUSH) 0.9 %
10 SYRINGE (ML) INJECTION PRN
Status: DISCONTINUED | OUTPATIENT
Start: 2019-06-05 | End: 2019-06-08 | Stop reason: HOSPADM

## 2019-06-05 RX ORDER — SODIUM CHLORIDE 9 MG/ML
INJECTION, SOLUTION INTRAVENOUS CONTINUOUS
Status: DISCONTINUED | OUTPATIENT
Start: 2019-06-05 | End: 2019-06-05

## 2019-06-05 RX ORDER — SODIUM POLYSTYRENE SULFONATE 15 G/60ML
30 SUSPENSION ORAL; RECTAL
Status: ACTIVE | OUTPATIENT
Start: 2019-06-05 | End: 2019-06-05

## 2019-06-05 RX ORDER — IPRATROPIUM BROMIDE AND ALBUTEROL SULFATE 2.5; .5 MG/3ML; MG/3ML
3 SOLUTION RESPIRATORY (INHALATION) 3 TIMES DAILY
Status: DISCONTINUED | OUTPATIENT
Start: 2019-06-05 | End: 2019-06-08 | Stop reason: HOSPADM

## 2019-06-05 RX ORDER — SENNA PLUS 8.6 MG/1
1 TABLET ORAL DAILY PRN
Status: DISCONTINUED | OUTPATIENT
Start: 2019-06-05 | End: 2019-06-08 | Stop reason: HOSPADM

## 2019-06-05 RX ORDER — HEPARIN SODIUM 5000 [USP'U]/ML
5000 INJECTION, SOLUTION INTRAVENOUS; SUBCUTANEOUS EVERY 8 HOURS SCHEDULED
Status: DISCONTINUED | OUTPATIENT
Start: 2019-06-05 | End: 2019-06-05 | Stop reason: SDUPTHER

## 2019-06-05 RX ORDER — ONDANSETRON 4 MG/1
4 TABLET, ORALLY DISINTEGRATING ORAL EVERY 6 HOURS PRN
Status: DISCONTINUED | OUTPATIENT
Start: 2019-06-05 | End: 2019-06-08 | Stop reason: HOSPADM

## 2019-06-05 RX ORDER — HEPARIN SODIUM 5000 [USP'U]/ML
5000 INJECTION, SOLUTION INTRAVENOUS; SUBCUTANEOUS EVERY 8 HOURS SCHEDULED
Status: DISCONTINUED | OUTPATIENT
Start: 2019-06-05 | End: 2019-06-08 | Stop reason: HOSPADM

## 2019-06-05 RX ORDER — SODIUM CHLORIDE 9 MG/ML
INJECTION, SOLUTION INTRAVENOUS CONTINUOUS
Status: DISCONTINUED | OUTPATIENT
Start: 2019-06-05 | End: 2019-06-06

## 2019-06-05 RX ORDER — DEXTROSE MONOHYDRATE 25 G/50ML
12.5 INJECTION, SOLUTION INTRAVENOUS PRN
Status: DISCONTINUED | OUTPATIENT
Start: 2019-06-05 | End: 2019-06-08 | Stop reason: HOSPADM

## 2019-06-05 RX ORDER — NICOTINE POLACRILEX 4 MG
15 LOZENGE BUCCAL PRN
Status: DISCONTINUED | OUTPATIENT
Start: 2019-06-05 | End: 2019-06-08 | Stop reason: HOSPADM

## 2019-06-05 RX ORDER — CALCITRIOL 0.25 UG/1
0.25 CAPSULE, LIQUID FILLED ORAL 3 TIMES DAILY
Status: DISCONTINUED | OUTPATIENT
Start: 2019-06-05 | End: 2019-06-05

## 2019-06-05 RX ORDER — DEXTROSE MONOHYDRATE 50 MG/ML
100 INJECTION, SOLUTION INTRAVENOUS PRN
Status: DISCONTINUED | OUTPATIENT
Start: 2019-06-05 | End: 2019-06-08 | Stop reason: HOSPADM

## 2019-06-05 RX ORDER — FUROSEMIDE 20 MG/1
20 TABLET ORAL DAILY
Status: DISCONTINUED | OUTPATIENT
Start: 2019-06-05 | End: 2019-06-05

## 2019-06-05 RX ORDER — ACETAMINOPHEN 325 MG/1
650 TABLET ORAL EVERY 4 HOURS PRN
Status: DISCONTINUED | OUTPATIENT
Start: 2019-06-05 | End: 2019-06-08 | Stop reason: HOSPADM

## 2019-06-05 RX ORDER — LANOLIN ALCOHOL/MO/W.PET/CERES
325 CREAM (GRAM) TOPICAL 2 TIMES DAILY WITH MEALS
Status: DISCONTINUED | OUTPATIENT
Start: 2019-06-05 | End: 2019-06-08 | Stop reason: HOSPADM

## 2019-06-05 RX ORDER — NICOTINE 21 MG/24HR
1 PATCH, TRANSDERMAL 24 HOURS TRANSDERMAL DAILY PRN
Status: DISCONTINUED | OUTPATIENT
Start: 2019-06-05 | End: 2019-06-08 | Stop reason: HOSPADM

## 2019-06-05 RX ORDER — AMIODARONE HYDROCHLORIDE 200 MG/1
200 TABLET ORAL DAILY
Status: DISCONTINUED | OUTPATIENT
Start: 2019-06-05 | End: 2019-06-08 | Stop reason: HOSPADM

## 2019-06-05 RX ORDER — ATORVASTATIN CALCIUM 20 MG/1
20 TABLET, FILM COATED ORAL DAILY
Status: DISCONTINUED | OUTPATIENT
Start: 2019-06-05 | End: 2019-06-08 | Stop reason: HOSPADM

## 2019-06-05 RX ORDER — ALBUTEROL SULFATE 2.5 MG/3ML
1.25 SOLUTION RESPIRATORY (INHALATION) EVERY 6 HOURS PRN
Status: DISCONTINUED | OUTPATIENT
Start: 2019-06-05 | End: 2019-06-08 | Stop reason: HOSPADM

## 2019-06-05 RX ORDER — RASAGILINE 0.5 MG/1
1 TABLET ORAL DAILY
Status: DISCONTINUED | OUTPATIENT
Start: 2019-06-05 | End: 2019-06-08 | Stop reason: HOSPADM

## 2019-06-05 RX ORDER — ASPIRIN 81 MG/1
81 TABLET ORAL DAILY
Status: DISCONTINUED | OUTPATIENT
Start: 2019-06-05 | End: 2019-06-08 | Stop reason: HOSPADM

## 2019-06-05 RX ORDER — SODIUM POLYSTYRENE SULFONATE 15 G/60ML
15 SUSPENSION ORAL; RECTAL
Status: ACTIVE | OUTPATIENT
Start: 2019-06-05 | End: 2019-06-05

## 2019-06-05 RX ORDER — DOCUSATE SODIUM 100 MG/1
100 CAPSULE, LIQUID FILLED ORAL 2 TIMES DAILY
Status: DISCONTINUED | OUTPATIENT
Start: 2019-06-05 | End: 2019-06-08 | Stop reason: HOSPADM

## 2019-06-05 RX ORDER — CARBIDOPA AND LEVODOPA 25; 100 MG/1; MG/1
1 TABLET, EXTENDED RELEASE ORAL 4 TIMES DAILY
Status: DISCONTINUED | OUTPATIENT
Start: 2019-06-05 | End: 2019-06-08 | Stop reason: HOSPADM

## 2019-06-05 RX ORDER — ONDANSETRON 2 MG/ML
4 INJECTION INTRAMUSCULAR; INTRAVENOUS EVERY 6 HOURS PRN
Status: DISCONTINUED | OUTPATIENT
Start: 2019-06-05 | End: 2019-06-08 | Stop reason: HOSPADM

## 2019-06-05 RX ORDER — LANOLIN ALCOHOL/MO/W.PET/CERES
3 CREAM (GRAM) TOPICAL NIGHTLY PRN
Status: DISCONTINUED | OUTPATIENT
Start: 2019-06-05 | End: 2019-06-08 | Stop reason: HOSPADM

## 2019-06-05 RX ORDER — GUAIFENESIN 600 MG/1
600 TABLET, EXTENDED RELEASE ORAL 2 TIMES DAILY
Status: DISCONTINUED | OUTPATIENT
Start: 2019-06-05 | End: 2019-06-08 | Stop reason: HOSPADM

## 2019-06-05 RX ADMIN — DOCUSATE SODIUM 100 MG: 100 CAPSULE, LIQUID FILLED ORAL at 02:36

## 2019-06-05 RX ADMIN — ROPINIROLE HYDROCHLORIDE 2 MG: 1 TABLET, FILM COATED ORAL at 16:29

## 2019-06-05 RX ADMIN — ASPIRIN 81 MG: 81 TABLET, COATED ORAL at 09:10

## 2019-06-05 RX ADMIN — DOCUSATE SODIUM 100 MG: 100 CAPSULE, LIQUID FILLED ORAL at 22:31

## 2019-06-05 RX ADMIN — CARBIDOPA AND LEVODOPA 1 TABLET: 25; 100 TABLET, EXTENDED RELEASE ORAL at 22:32

## 2019-06-05 RX ADMIN — HEPARIN SODIUM 5000 UNITS: 5000 INJECTION INTRAVENOUS; SUBCUTANEOUS at 22:33

## 2019-06-05 RX ADMIN — CARBIDOPA AND LEVODOPA 1 TABLET: 25; 100 TABLET, EXTENDED RELEASE ORAL at 12:04

## 2019-06-05 RX ADMIN — IPRATROPIUM BROMIDE AND ALBUTEROL SULFATE 3 ML: .5; 3 SOLUTION RESPIRATORY (INHALATION) at 09:52

## 2019-06-05 RX ADMIN — ROPINIROLE HYDROCHLORIDE 2 MG: 1 TABLET, FILM COATED ORAL at 09:12

## 2019-06-05 RX ADMIN — AMIODARONE HYDROCHLORIDE 200 MG: 200 TABLET ORAL at 09:09

## 2019-06-05 RX ADMIN — LINAGLIPTIN 2.5 MG: 5 TABLET, FILM COATED ORAL at 09:13

## 2019-06-05 RX ADMIN — DARBEPOETIN ALFA 200 MCG: 200 INJECTION, SOLUTION INTRAVENOUS; SUBCUTANEOUS at 16:36

## 2019-06-05 RX ADMIN — MEROPENEM 1 G: 1 INJECTION, POWDER, FOR SOLUTION INTRAVENOUS at 12:04

## 2019-06-05 RX ADMIN — HEPARIN SODIUM 5000 UNITS: 5000 INJECTION INTRAVENOUS; SUBCUTANEOUS at 06:18

## 2019-06-05 RX ADMIN — ACETAMINOPHEN 650 MG: 325 TABLET ORAL at 09:37

## 2019-06-05 RX ADMIN — MAGNESIUM OXIDE TAB 400 MG (241.3 MG ELEMENTAL MG) 400 MG: 400 (241.3 MG) TAB at 09:10

## 2019-06-05 RX ADMIN — SENNA 17.2 MG: 8.6 TABLET, COATED ORAL at 02:36

## 2019-06-05 RX ADMIN — CARBIDOPA AND LEVODOPA 1 TABLET: 25; 100 TABLET, EXTENDED RELEASE ORAL at 09:10

## 2019-06-05 RX ADMIN — MAGNESIUM OXIDE TAB 400 MG (241.3 MG ELEMENTAL MG) 400 MG: 400 (241.3 MG) TAB at 02:36

## 2019-06-05 RX ADMIN — ATORVASTATIN CALCIUM 20 MG: 20 TABLET, FILM COATED ORAL at 09:11

## 2019-06-05 RX ADMIN — CARBIDOPA AND LEVODOPA 1 TABLET: 25; 100 TABLET, EXTENDED RELEASE ORAL at 02:36

## 2019-06-05 RX ADMIN — SODIUM CHLORIDE: 9 INJECTION, SOLUTION INTRAVENOUS at 09:43

## 2019-06-05 RX ADMIN — ROPINIROLE HYDROCHLORIDE 2 MG: 1 TABLET, FILM COATED ORAL at 22:31

## 2019-06-05 RX ADMIN — IPRATROPIUM BROMIDE AND ALBUTEROL SULFATE 3 ML: .5; 3 SOLUTION RESPIRATORY (INHALATION) at 19:52

## 2019-06-05 RX ADMIN — HEPARIN SODIUM 5000 UNITS: 5000 INJECTION INTRAVENOUS; SUBCUTANEOUS at 16:30

## 2019-06-05 RX ADMIN — GUAIFENESIN 600 MG: 600 TABLET, EXTENDED RELEASE ORAL at 22:32

## 2019-06-05 RX ADMIN — SENNA 17.2 MG: 8.6 TABLET, COATED ORAL at 22:32

## 2019-06-05 RX ADMIN — MOMETASONE FUROATE AND FORMOTEROL FUMARATE DIHYDRATE 2 PUFF: 200; 5 AEROSOL RESPIRATORY (INHALATION) at 19:53

## 2019-06-05 RX ADMIN — PANTOPRAZOLE SODIUM 40 MG: 40 TABLET, DELAYED RELEASE ORAL at 16:29

## 2019-06-05 RX ADMIN — ACETAMINOPHEN 650 MG: 325 TABLET ORAL at 22:37

## 2019-06-05 RX ADMIN — RASAGILINE 1 MG: 0.5 TABLET ORAL at 09:12

## 2019-06-05 RX ADMIN — GUAIFENESIN 600 MG: 600 TABLET, EXTENDED RELEASE ORAL at 16:29

## 2019-06-05 RX ADMIN — PANTOPRAZOLE SODIUM 40 MG: 40 TABLET, DELAYED RELEASE ORAL at 06:18

## 2019-06-05 RX ADMIN — PRAMIPEXOLE DIHYDROCHLORIDE 0.5 MG: 0.25 TABLET ORAL at 17:18

## 2019-06-05 RX ADMIN — PRAMIPEXOLE DIHYDROCHLORIDE 0.5 MG: 0.25 TABLET ORAL at 22:32

## 2019-06-05 RX ADMIN — DOCUSATE SODIUM 100 MG: 100 CAPSULE, LIQUID FILLED ORAL at 09:10

## 2019-06-05 RX ADMIN — IPRATROPIUM BROMIDE AND ALBUTEROL SULFATE 3 ML: .5; 3 SOLUTION RESPIRATORY (INHALATION) at 14:04

## 2019-06-05 RX ADMIN — FERROUS SULFATE TAB EC 325 MG (65 MG FE EQUIVALENT) 325 MG: 325 (65 FE) TABLET DELAYED RESPONSE at 09:11

## 2019-06-05 RX ADMIN — CARBIDOPA AND LEVODOPA 1 TABLET: 25; 100 TABLET, EXTENDED RELEASE ORAL at 16:29

## 2019-06-05 RX ADMIN — FERROUS SULFATE TAB EC 325 MG (65 MG FE EQUIVALENT) 325 MG: 325 (65 FE) TABLET DELAYED RESPONSE at 16:29

## 2019-06-05 ASSESSMENT — PAIN SCALES - GENERAL
PAINLEVEL_OUTOF10: 10
PAINLEVEL_OUTOF10: 3
PAINLEVEL_OUTOF10: 3
PAINLEVEL_OUTOF10: 10

## 2019-06-05 ASSESSMENT — PAIN - FUNCTIONAL ASSESSMENT: PAIN_FUNCTIONAL_ASSESSMENT: PREVENTS OR INTERFERES SOME ACTIVE ACTIVITIES AND ADLS

## 2019-06-05 ASSESSMENT — PAIN DESCRIPTION - ORIENTATION: ORIENTATION: RIGHT

## 2019-06-05 ASSESSMENT — PAIN DESCRIPTION - ONSET: ONSET: UNABLE TO ASSESS

## 2019-06-05 ASSESSMENT — PAIN DESCRIPTION - LOCATION: LOCATION: KNEE

## 2019-06-05 NOTE — PROGRESS NOTES
[place] [address] Continence Nurse  Consult Note       NAME:  Jyoti Pretty  MEDICAL RECORD NUMBER:  [id number]  AGE: 72 y.o. GENDER: female  : 1945  TODAY'S DATE:  2019    Subjective:     Reason for WOCN Evaluation and Assessment: coccygeal unstageable pressure injury, left medial thigh ST 1 pressure injury, right heel deep tissue injury       Jyoti Pretty is a 72 y.o. female referred by:   [x] Physician  [] Nursing  [] Other:     Wound Identification:  Wound Type: pressure  Contributing Factors: diabetes, chronic pressure, decreased mobility, shear force, obesity, decreased tissue oxygenation, incontinence of stool and incontinence of urine        PAST MEDICAL HISTORY        Diagnosis Date    Acute on chronic diastolic [facility]ive [facility] failure [facility]     Anesthesia complication     DIFFICULTY WAKING OP    Asthma     Atrial fibrillation ([facility] 2013    Cataracts, bilateral     Cellulitis     BILAT LOWER LEGS-PT STATES IS IMPROVING    Cerebral artery occlusion with cerebral infarction [facility])     Last stroke was 2017 w/no deficits-TOTAL OF 3    Chronic kidney disease 2013    NOT ON DIALYSIS YET    Constipation     CHRONIC    COPD (chronic obstructive pulmonary disease) ([facility] 2008    PT. SEES DR. BECK. Home O2 at 2-3L/NC .     Difficult intravenous access     VEINS ROLL    Diverticulosis     DM2 (diabetes mellitus, type 2) ([address] 2008    Full dentures     FULL UPPER ONLY    GERD (gastroesophageal reflux disease) 2008    ON RX    Headache(784.0)     [place] (hard of hearing)     HAS HEARING AIDS BUT DOES NOT WEAR    Hyperlipidemia     Hyperplastic polyp of intestine     Hypertension     Impaired ambulation     USES TRANFER CHAIR WALKER OR CANE    Kidney stone 2018    PRESENTLY-SMALL    Morbid obesity with BMI of 40.0-44.9, adult ([address] 2016    ECTOR on CPAP 2008    USES C-PAP NIGHTLY    Osteoarthritis     Parkinson disease ([address]     Restless leg syndrome 2013 MILD    Spinal stenosis in cervical region 6/2/2013    Type II or unspecified type diabetes mellitus without mention of complication, not stated as uncontrolled     Unspecified sleep apnea     cpap nightly    Urethral caruncle 3/8/2018    Wears glasses        PAST SURGICAL HISTORY    Past Surgical History:   Procedure Laterality Date    APPENDECTOMY      BRONCHOSCOPY  06/05/2018    CERVICAL [address] SURGERY  2012    CERVICAL SPINE SURGERY  5/31/13    posterior c5-t1    COLONOSCOPY  2009    COLONOSCOPY  12/29/2016    incomplete was not cleaned out    COLONOSCOPY  12/30/2016    COLONOSCOPY  04/25/2017     SIGMOID COLON POLYPECTOMY:  HYPERPLASTIC POLYP ,   DIVERTICULOSIS    CYSTORRHAPHY  04/04/2018    EYE SURGERY Bilateral 2017    CATARACTS W/ IOL    HERNIA REPAIR  1999    VENTRAL    LAMINECTOMY  05/31/2013    Dr. Rivero Pod DX W/CELL WASHG 100 [facility] N/A 6/5/2018    BRONCHOSCOPY performed by Dougie Murcia MD at [address] N/A 4/25/2017    COLONOSCOPY POLYPECTOMY HOT BIOPSY performed by Alvina Braun MD at [address] [facility] 4/4/2018    CYSTOSCOPY performed by Porter Chakraborty MD at [facility] Right 05/28/2019    SHOULDER OPEN REDUCTION INTERNAL FIXATION PERCUTANEOUS PINNING & CLOSED REDUCTION (Right Shoulder    SHOULDER FRACTURE SURGERY Right 5/28/2019    SHOULDER CLOSED REDUCTION PERCUTANEOUS PINNING RIGHT performed by Colleen White MD at [place] [address]  12/28/2016    gastritis, esophagitis,     UPPER GASTROINTESTINAL ENDOSCOPY  08/29/2018    with biopsy    UPPER GASTROINTESTINAL ENDOSCOPY N/A 8/29/2018    EGD BIOPSY performed by Alvina Braun MD at [facility]    Family History   Problem Relation Age of Onset    Heart Failure Mother     Hypertension Mother     Heart Disease Mother    [facility]

## 2019-06-05 NOTE — CARE COORDINATION
Case Management Initial Discharge Plan  Melissa Howell,         Readmission Risk              Risk of Unplanned Readmission:        47             Met with:spouse/SO to discuss discharge plans. Information verified: address, contacts, phone number, , insurance Yes  PCP: Sushma Dunbar MD  Date of last visit: Pt was at [facility]    Insurance Provider: MMO/ Medicare    Discharge Planning  Current Residence:     Living Arrangements:  Other (Comment)(Nursing home)   Home has one stories/3 stairs to climb  Support Systems:  Children, Family Members  Current Services PTA:    Supplier: [employer]  Patient able to perform ADL's:Dependent  DME used to aid ambulation prior to admission: walker/during admissionTBD    Potential Assistance Needed:  N/A    Pharmacy: [facility]   Potential Assistance Purchasing Medications:  No  Does patient want to participate in local refill/ meds to beds program?  No    Patient agreeable to home care: No  [place] of choice provided:  n/a      Type of Home Care Services:  None  Patient expects to be discharged to:  LONG TERM ACUTE CARE [facility]    Prior SNF/Rehab Placement and Facility: yes, [facility] to SNF/Rehab: Yes  [place] of choice provided: yes   Evaluation: yes    Expected Discharge date:  19  Follow Up Appointment: Best Day/ Time: Monday AM    Transportation provider: [facility]  Transportation arrangements needed for discharge: Yes    Discharge Plan: Met with pt's . Pt is known to  from recent admission. Pt was receiving rehab at [facility].  is requesting rehab at [facility] of [address] [place] [address]. If she is not approved for inpatient rehab, he would like her to return to [facility]. Sent referral to [facility].  Dafne Prieto        Electronically signed by NILDA Jerry on 19 at 12:15 PM

## 2019-06-05 NOTE — PROGRESS NOTES
Physical Therapy    Facility/Department: [facility]  Initial Assessment    NAME: Tony Rivas  : 1945  MRN: [id number]    Date of Service: 2019    Discharge Recommendations:  Subacute/Skilled Nursing Facility        Assessment   Body structures, Functions, Activity limitations: Decreased functional mobility ; Decreased cognition;Decreased ADL status; Decreased endurance;Decreased ROM; Decreased strength;Decreased balance  Assessment: Pt. is lethargic and presents with Rt. knee pain. X-rays negative after fall/ last admission one week ago. Rt. shoulder in sling s/p shoulder sx. last week. Pt. came from [facility] and will be returning to SNF at discharge to continue therapy to improve functional mobility  Specific instructions for Next Treatment: progress sit to stand transfer/ standing balance  Prognosis: Good  Decision Making: High Complexity  REQUIRES PT FOLLOW UP: Yes  Activity Tolerance  Activity Tolerance: Patient limited by fatigue;Patient limited by pain; Patient limited by endurance; Patient limited by cognitive status       Patient Diagnosis(es): The primary encounter diagnosis was Hypercalcemia. Diagnoses of Urinary tract infection without hematuria, site unspecified, Altered mental status, unspecified altered mental status type, and Acute kidney injury superimposed on chronic kidney disease ([address] were also pertinent to this visit.      has a past medical history of Acute on chronic diastolic congestive heart failure ([address] Anesthesia complication, Asthma, Atrial fibrillation ([address] Cataracts, bilateral, Cellulitis, Cerebral artery occlusion with cerebral infarction ([address] Chronic kidney disease, Constipation, COPD (chronic obstructive pulmonary disease) ([address] Difficult intravenous access, Diverticulosis, DM2 (diabetes mellitus, type 2) ([address] Full dentures, GERD (gastroesophageal reflux disease), Headache(784.0), [place] (hard of hearing), Hyperlipidemia, Hyperplastic polyp of intestine, Level: Oriented to place; Disoriented to time;Disoriented to situation  Social/Functional History  Social/Functional History  Lives With: Spouse(Simultaneous filing. User may not have seen previous data.)  Type of Home: House  Home Layout: Two level, Able to Live on Main level with bedroom/bathroom  Home Access: Stairs to enter with rails  Entrance Stairs - Number of Steps: 3  Receives Help From: Family, Home health  ADL Assistance: Needs assistance  Homemaking Assistance: Needs assistance  Ambulation Assistance: Needs assistance  Transfer Assistance: Needs assistance  Additional Comments: details on history not obtained- pt is poor historian. Pt has had frequent hospital stays/was at [address] a week ago for fall/shoulder fx and surgery. Pt went to [place], [place] and then is back at hospital. Pt to be returning to SNF(Simultaneous filing. User may not have seen previous data.)  Cognition        Objective     Observation/Palpation  Posture: Good  Observation: very lethargic/difficulty keeping eyes open  Edema: Rt. LE appears swollen compared to L in thigh, knee and calf. Negative Homans sign    PROM RLE (degrees)  RLE PROM: WFL  RLE General PROM: painful Rt. knee/ pt. does not actively move  AROM LLE (degrees)  LLE AROM : WFL  PROM RUE (degrees)  RUE PROM: WFL  RUE General PROM: elbow/ wrist / hand (AAROM)  AROM RUE (degrees)  RUE General AROM: sling   Strength RLE  Comment: not actively moving Rt. LE due to knee pain  Strength LLE  Strength LLE: WFL  Strength RUE  Comment: N/T  Strength LUE  Strength LUE: WNL  Tone RLE  RLE Tone: Normotonic  Tone LLE  LLE Tone: Normotonic     Bed mobility  Supine to Sit: Maximum assistance;2 Person assistance  Sit to Supine: Maximum assistance;2 Person assistance  Comment: immediate sitting balance min-mod assist; after one min. was SBA  Transfers  Sit to Stand:  Moderate Assistance;2 Person Assistance([place])  Stand to sit: Minimal Assistance;2 Person Assistance  Comment: Stood

## 2019-06-05 NOTE — PROGRESS NOTES
[facility]    Progress Note    6/5/2019    1:06 PM    Name:   Piero Zuniga  MRN:     [id number]     [place]:      [de-identified]   Room:   2001/2001-02  IP Day:  1  Admit Date:  6/4/2019  5:35 PM    PCP:   Stephen Morales MD  Code Status:  Full Code    Subjective:     C/C:   Chief Complaint   Patient presents with    Abnormal Lab    Arm Pain     right     Interval History Status: not changed. No acute issues overnight   No complaints this AM  Family at bedside, questions answered  AMS improved, no other complaints      Brief History:     The patient is a 72 y.o.  female who presents to the hospital with complaint of right arm pain and abnormal lab value. The patient was brought to the hospital by EMS for evaluation of mental status changes. She recently underwent a right humerus fracture repair post mechanical fall on 5/28/19, she was sent to [facility] [place] CARE AT [facility] for rehab after discharge. ER notes indicate the patient's family noted her to be more lethargic than normal. The patient endorses right shoulder pain that she describes as aching, constant and 8/10 in intensity. No definitive aggravating or alleviating factors. She is alert and interactive, but has difficulty answering questions. She denies fever, chills or nausea. She has a history of parkinson's disease, diabetes, hypertension, atrial fibrillation, ECTOR with CPAP use, CKD and CHF. She will be admitted for further evaluation and treatment.      Her calcium is notably elevated at 13.4, ionized calcium 1.76, the patient takes calcium citrate and calcitrol. Record review indicates she had suspected hypoparathyroidism.      UA indicated small leukocytes, moderate bacteria.      She sees Dr. Jordy Mix outpatient with cardiology.        Review of Systems:     Constitutional:  negative for chills, fevers, sweats  Respiratory:  Positive for cough  Cardiovascular:  negative for chest pain, chest

## 2019-06-05 NOTE — PROGRESS NOTES
Occupational Therapy   Occupational Therapy Initial Assessment  Date: 2019   Patient Name: Odette Travis  MRN: [id number]     : 1945    Date of Service: 2019    Discharge Recommendations:  [address] RN reports patient is medically stable for therapy treatment this date. Chart reviewed prior to treatment and patient is agreeable for therapy. All lines intact and patient positioned comfortably at end of treatment. All patient needs addressed prior to ending therapy session. Assessment   Performance deficits / Impairments: Decreased functional mobility ; Decreased cognition;Decreased endurance;Decreased ADL status; Decreased strength;Decreased ROM; Decreased balance;Decreased safe awareness  Prognosis: Fair  Decision Making: High Complexity  Patient Education: OT POC, discharge recommendations, safety, purpose of moving/sitting EOB, cognitive reorientation  REQUIRES OT FOLLOW UP: Yes  Activity Tolerance  Activity Tolerance: Patient limited by fatigue;Treatment limited secondary to medical complications (free text); Treatment limited secondary to decreased cognition  Safety Devices  Safety Devices in place: Yes  Type of devices: Call light within reach;Nurse notified;Gait belt;Patient at risk for falls; Left in bed;Bed alarm in place           Patient Diagnosis(es): The primary encounter diagnosis was Hypercalcemia. Diagnoses of Urinary tract infection without hematuria, site unspecified, Altered mental status, unspecified altered mental status type, and Acute kidney injury superimposed on chronic kidney disease ([address] were also pertinent to this visit.      has a past medical history of Acute on chronic diastolic congestive heart failure ([address] Anesthesia complication, Asthma, Atrial fibrillation ([address] Cataracts, bilateral, Cellulitis, Cerebral artery occlusion with cerebral infarction ([address] Chronic kidney disease, Constipation, COPD (chronic obstructive pulmonary disease) [facility]), Difficult intravenous access, Diverticulosis, DM2 (diabetes mellitus, type 2) ([facility] Full dentures, GERD (gastroesophageal reflux disease), Headache(784.0), [place] (hard of hearing), Hyperlipidemia, Hyperplastic polyp of intestine, Hypertension, Impaired ambulation, Kidney stone, Morbid obesity with BMI of 40.0-44.9, adult ([facility] ECTOR on CPAP, Osteoarthritis, Parkinson disease ([facility] Restless leg syndrome, Spinal stenosis in cervical region, Type II or unspecified type diabetes mellitus without mention of complication, not stated as uncontrolled, Unspecified sleep apnea, Urethral caruncle, and Wears glasses. has a past surgical history that includes Cervical disc surgery (2012); Appendectomy; Tonsillectomy and adenoidectomy (1953); hernia repair (1999); eye surgery (Bilateral, 2017); Cervical spine surgery (5/31/13); laminectomy (05/31/2013); Upper gastrointestinal endoscopy (12/28/2016); Colonoscopy (2009); Colonoscopy (12/29/2016); Colonoscopy (12/30/2016); Colonoscopy (04/25/2017); pr colsc flx w/removal lesion by hot bx forceps (N/A, 4/25/2017); Cystorrhaphy (04/04/2018); pr cystourethroscopy,biopsies (N/A, 4/4/2018); bronchoscopy (06/05/2018); pr [facility] incl fluor gdnce dx w/cell washg spx (N/A, 6/5/2018); Upper gastrointestinal endoscopy (08/29/2018); Upper gastrointestinal endoscopy (N/A, 8/29/2018); Shoulder Closed Reduction (Right, 05/28/2019); and shoulder fracture surgery (Right, 5/28/2019). Restrictions  Restrictions/Precautions  Restrictions/Precautions: General Precautions, Fall Risk, Contact Precautions, Weight Bearing  Upper Extremity Weight Bearing Restrictions  Right Upper Extremity Weight Bearing: Non Weight Bearing(Rt. UE in sling; s/p shoulder sx. one week ago with orders for elbow/wrist/hand ROM only)  Position Activity Restriction  Other position/activity restrictions: Rt. shoulder sx.  one week ago with orders for elbow/wrist/hand ROM only    Subjective   General  Chart

## 2019-06-05 NOTE — H&P
[facility]    HISTORY AND PHYSICAL EXAMINATION            Date:   6/4/2019  Patient name:  Jailyn Escamilla  Date of admission:  6/4/2019  5:35 PM  MRN:   [id number]  Account:  [de-identified]  YOB: 1945  PCP:    Alma Casillas MD  Room:   [place] [address]  Code Status:    Full Code    Chief Complaint:     Chief Complaint   Patient presents with    Abnormal Lab    Arm Pain     right       History Obtained From:     Patient and electronic medical record. History of Present Illness: The patient is a 72 y.o.  female who presents to the hospital with complaint of right arm pain and abnormal lab value. The patient was brought to the hospital by EMS for evaluation of mental status changes. She recently underwent a right humerus fracture repair post mechanical fall on 5/28/19, she was sent to [facility] for rehab after discharge. ER notes indicate the patient's family noted her to be more lethargic than normal. The patient endorses right shoulder pain that she describes as aching, constant and 8/10 in intensity. No definitive aggravating or alleviating factors. She is alert and interactive, but has difficulty answering questions. She denies fever, chills or nausea. She has a history of parkinson's disease, diabetes, hypertension, atrial fibrillation, ECTOR with CPAP use, CKD and CHF. She will be admitted for further evaluation and treatment. Her calcium is notably elevated at 13.4, ionized calcium 1.76, the patient takes calcium citrate and calcitrol. Record review indicates she had suspected hypoparathyroidism. UA indicated small leukocytes, moderate bacteria. She sees Dr. Brennan Carbajal outpatient with cardiology.      Past Medical History:     Past Medical History:   Diagnosis Date    Acute on chronic diastolic congestive heart failure ([address]     Anesthesia complication     DIFFICULTY WAKING OP    Asthma     Atrial fibrillation ([address] 2013    Cataracts, bilateral     Cellulitis     BILAT LOWER LEGS-PT STATES IS IMPROVING    Cerebral artery occlusion with cerebral infarction [facility])     Last stroke was February 2017 w/no deficits-TOTAL OF 3    Chronic kidney disease 2013    NOT ON DIALYSIS YET    Constipation     CHRONIC    COPD (chronic obstructive pulmonary disease) ([facility] 2008    PT. SEES DR. BECK. Home O2 at 2-3L/NC 24/7.     Difficult intravenous access     VEINS ROLL    Diverticulosis     DM2 (diabetes mellitus, type 2) ([facility] 2008    Full dentures     FULL UPPER ONLY    GERD (gastroesophageal reflux disease) 2008    ON RX    Headache(784.0)     [place] (hard of hearing)     HAS HEARING AIDS BUT DOES NOT WEAR    Hyperlipidemia     Hyperplastic polyp of intestine     Hypertension 2008    Impaired ambulation     USES TRANFER CHAIR WALKER OR CANE    Kidney stone 2018    PRESENTLY-SMALL    Morbid obesity with BMI of 40.0-44.9, adult ([facility] 12/30/2016    ECTOR on CPAP 2008    USES C-PAP NIGHTLY    Osteoarthritis     Parkinson disease ([facility] 2015    Restless leg syndrome 2013    MILD    Spinal stenosis in cervical region 6/2/2013    Type II or unspecified type diabetes mellitus without mention of complication, not stated as uncontrolled     Unspecified sleep apnea     cpap nightly    Urethral caruncle 3/8/2018    Wears glasses         Past Surgical History:     Past Surgical History:   Procedure Laterality Date    APPENDECTOMY      BRONCHOSCOPY  06/05/2018   [address] SURGERY  2012    CERVICAL SPINE SURGERY  5/31/13    posterior [id number]    COLONOSCOPY  2009    COLONOSCOPY  12/29/2016    incomplete was not cleaned out    COLONOSCOPY  12/30/2016    COLONOSCOPY  04/25/2017     SIGMOID COLON POLYPECTOMY:  HYPERPLASTIC POLYP ,   DIVERTICULOSIS    CYSTORRHAPHY  04/04/2018    EYE SURGERY Bilateral 2017    CATARACTS W/ IOL    HERNIA REPAIR  1999    VENTRAL    LAMINECTOMY  05/31/2013    Dr. Brown Castro carbidopa-levodopa (SINEMET CR)  MG per extended release tablet Take 1 tablet by mouth 4 times daily 4/9/19  Yes Lesly Hill DO   ipratropium-albuterol (DUONEB) 0.5-2.5 (3) MG/3ML SOLN nebulizer solution Inhale 3 mLs into the lungs three times daily 3/20/19  Yes Lesly Hill DO   ONETOUCH VERIO strip 1 each by In Vitro route daily As needed. 3/4/19  Yes Lesly Hill DO   pantoprazole (PROTONIX) 40 MG tablet TAKE 1 TABLET TWICE A DAY BEFORE MEALS 2/7/19  Yes Lesly Hlil DO   linagliptin (TRADJENTA) 5 MG tablet Take 0.5 tablets by mouth daily 1/16/19  Yes Lesly Hill DO   ONE TOUCH ULTRA TEST strip USE 1 STRIP THREE TIMES A DAY 12/26/18  Yes Lesly Hill DO   cyanocobalamin 1000 MCG/ML injection Inject 1,000 mcg into the muscle See Admin Instructions Every 3 weeks   Yes Historical Provider, MD   albuterol (ACCUNEB) 1.25 MG/3ML nebulizer solution Inhale 1 ampule into the lungs every 6 hours as needed for Wheezing or Shortness of Breath   Yes Historical Provider, MD   [address] 1 Units by Does not apply route 2 times daily 10/23/18  Yes Lesly Hill DO   nystatin (MYCOSTATIN) 076241 UNIT/GM powder Apply 3 times daily.  10/19/18  Yes Lesly Hill DO   amiodarone (CORDARONE) 200 MG tablet Take 200 mg by mouth daily    Yes Historical Provider, MD   melatonin 3 MG TABS tablet Take 1 tablet by mouth nightly as needed (sleep) 9/7/18  Yes Godfrey Marcos, DO   magnesium oxide (MAG-OX) 400 (241.3 Mg) MG TABS tablet Take 1 tablet by mouth 2 times daily 9/7/18  Yes Godfrey Marcos DO   calcitRIOL (ROCALTROL) 0.25 MCG capsule TAKE 1 CAPSULE THREE TIMES A DAY 8/22/18  Yes Lesly Hill DO   CALCIUM CITRATE PO Take 500 mg by mouth 3 times daily    Yes Historical Provider, MD   senna (SENOKOT) 8.6 MG tablet Take 2 tablets by mouth nightly    Yes Historical Provider, MD   aspirin 81 MG tablet Take 81 mg by mouth daily    Yes Historical Provider, MD   ferrous sulfate 325 (65 Fe) MG [facility] filed at 6/4/2019 2105  Gross per 24 hour   Intake 50 ml   Output --   Net 50 ml       Physical Exam   Constitutional: She is oriented to person, place, and time. Vital signs are normal. She appears well-developed. She is cooperative. She has a sickly appearance. No distress. HENT:   Head: Normocephalic and atraumatic. Right Ear: Hearing normal.   Left Ear: Hearing normal.   Nose: Nose normal. No rhinorrhea. Mouth/Throat: Oropharynx is clear and moist and mucous membranes are normal.   Eyes: Pupils are equal, round, and reactive to light. Conjunctivae, EOM and lids are normal. Right conjunctiva is not injected. Left conjunctiva is not injected. Right eye exhibits normal extraocular motion. Left eye exhibits normal extraocular motion. Right pupil is reactive. Left pupil is reactive. Pupils are equal.   Neck: Trachea normal and phonation normal. Neck supple. Carotid bruit is not present. No tracheal deviation present. No thyromegaly present. Cardiovascular: Normal rate, regular rhythm, normal heart sounds, intact distal pulses and normal pulses. No murmur heard. Pulmonary/Chest: Effort normal. No stridor. No respiratory distress. She has decreased breath sounds in the right lower field and the left lower field. Abdominal: Soft. Bowel sounds are normal. She exhibits no distension and no mass. There is no hepatosplenomegaly. There is no tenderness. There is no guarding. Musculoskeletal: She exhibits no edema or tenderness. Sling to right arm, surgical incision to right upper/shoulder extremity clean dry with intact sutures. Neurological: She is alert and oriented to person, place, and time. She is not disoriented. No cranial nerve deficit. Skin: Skin is warm, dry and intact. No lesion and no rash noted. She is not diaphoretic. No erythema. Psychiatric: Her speech is delayed. She is slowed. She is not actively hallucinating. Cognition and memory are impaired.    Nursing note and - 11.3 k/uL    RBC 2.83 (L) 3.95 - 5.11 m/uL    Hemoglobin 8.8 (L) 11.9 - 15.1 g/dL    Hematocrit 29.4 (L) 36.3 - 47.1 %    .9 (H) 82.6 - 102.9 fL    MCH 31.1 25.2 - 33.5 pg    MCHC 29.9 28.4 - 34.8 g/dL    RDW 14.9 (H) 11.8 - 14.4 %    Platelets 799 499 - 225 k/uL    MPV 10.4 8.1 - 13.5 fL    NRBC Automated 0.0 0.0 per 100 WBC    Differential Type NOT REPORTED     WBC Morphology NOT REPORTED     RBC Morphology NOT REPORTED     Platelet Estimate NOT REPORTED     Seg Neutrophils 74 (H) 36 - 65 %    Lymphocytes 10 (L) 24 - 43 %    Monocytes 8 3 - 12 %    Eosinophils % 1 1 - 4 %    Basophils 0 0 - 2 %    Immature Granulocytes 7 (H) 0 %    Segs Absolute 7.40 1.50 - 8.10 k/uL    Absolute Lymph # 1.00 (L) 1.10 - 3.70 k/uL    Absolute Mono # 0.80 0.10 - 1.20 k/uL    Absolute Eos # 0.10 0.00 - 0.44 k/uL    Basophils # 0.00 0.00 - 0.20 k/uL    Absolute Immature Granulocyte 0.70 (H) 0.00 - 0.30 k/uL    Morphology ANISOCYTOSIS PRESENT    Brain Natriuretic Peptide    Collection Time: 06/04/19  6:43 PM   Result Value Ref Range    Pro-BNP 1,240 (H) <300 pg/mL    BNP Interpretation Pro-BNP Reference Range:    Magnesium    Collection Time: 06/04/19  6:43 PM   Result Value Ref Range    Magnesium 2.1 1.6 - 2.6 mg/dL   Liver Profile    Collection Time: 06/04/19  6:43 PM   Result Value Ref Range    Alb 3.6 3.5 - 5.2 g/dL    Alkaline Phosphatase 75 35 - 104 U/L    ALT <5 (L) 5 - 33 U/L    AST 17 <32 U/L    Total Bilirubin 0.52 0.3 - 1.2 mg/dL    Bilirubin, Direct 0.14 <0.31 mg/dL    Bilirubin, Indirect 0.38 0.00 - 1.00 mg/dL    Total Protein 7.0 6.4 - 8.3 g/dL    Globulin NOT REPORTED 1.5 - 3.8 g/dL    Albumin/Globulin Ratio NOT REPORTED 1.0 - 2.5   Trop/Myoglobin    Collection Time: 06/04/19  6:43 PM   Result Value Ref Range    Troponin, High Sensitivity 36 (H) 0 - 14 ng/L    Troponin T NOT REPORTED <0.03 ng/mL    Troponin Interp NOT REPORTED     Myoglobin 198 (H) 25 - 58 ng/mL   Ammonia    Collection Time: 06/04/19  6:43 PM Result Value Ref Range    Ammonia 23 11 - 51 umol/L   Lactic Acid    Collection Time: 06/04/19  6:43 PM   Result Value Ref Range    Lactic Acid 1.1 0.5 - 2.2 mmol/L   Calcium, Ionized    Collection Time: 06/04/19  6:43 PM   Result Value Ref Range    Calcium, Ion 1.76 (HH) 1.13 - 1.33 mmol/L   EKG 12 Lead    Collection Time: 06/04/19  6:59 PM   Result Value Ref Range    Ventricular Rate 66 BPM    Atrial Rate 111 BPM    QRS Duration 100 ms    Q-T Interval 424 ms    QTc Calculation (Bazett) 444 ms    R Axis 1 degrees    T Axis 77 degrees   POC PANEL (G3)-ART    Collection Time: 06/04/19  7:52 PM   Result Value Ref Range    POC pH 7.50 (H) 7.35 - 7.45    POC pCO2 50 (H) 32 - 45 mm Hg    POC PO2 151 (H) 75 - 95 mm Hg    TCO2 (calc), Art 40 (H) 23 - 28 mmol/L    POC HCO3 38.8 (H) 22 - 27 mmol/L    Negative Base Excess, Art NOT REPORTED 0.0 - 2.0    Positive Base Excess, Art 16 (H) 0.0 - 2.0    POC O2 SAT 99 %    Pt Temp 37.0     O2 Device/Flow/% NOT REPORTED     FIO2 36.0     POC pH Temp NOT REPORTED     POC pCO2 Temp NOT REPORTED mm Hg    POC pO2 Temp NOT REPORTED mm Hg   Troponin    Collection Time: 06/04/19  8:39 PM   Result Value Ref Range    Troponin, High Sensitivity 33 (H) 0 - 14 ng/L    Troponin T NOT REPORTED <0.03 ng/mL    Troponin Interp NOT REPORTED        Imaging/Diagnostics:    Ct Head Wo Contrast    Result Date: 6/4/2019  No acute intracranial abnormality. No change from prior exams. Right maxillary sinusitis. Fluid in bilateral mastoids. Xr Chest Portable    Result Date: 6/4/2019  No acute findings in the chest.     Xr Chest Portable    Result Date: 5/30/2019  Hypoventilatory somewhat rotated AP portable chest x-ray without obvious acute cardiopulmonary disease; some worsening of retrocardiac density a consideration. RECOMMENDATION: Follow-up chest x-ray, preferably two-view examination in the department if possible.        Assessment :      Primary Problem  Urinary tract infection without

## 2019-06-05 NOTE — PLAN OF CARE
Problem: Falls - Risk of:  Goal: Will remain free from falls  Description  Will remain free from falls  Outcome: Ongoing  Note:   Room free of clutter  Hourly rounding   Non-skid socks worn  Side rails up x2  Bed low and locked  Call light in reach  Instructed to call out before getting out of bed  Anticipatory needs met  Bed alarm on  Falling star at the door and on wristband       Problem: Risk for Impaired Skin Integrity  Goal: Tissue integrity - skin and mucous membranes  Description  Structural intactness and normal physiological function of skin and  mucous membranes.   Outcome: Ongoing  Note:   Skin assessment completed and documented  Darrel scale updated  Relieve pressure to bony prominences  Avoid shearing  Assess s/sx of infection   Air mattress in place  Turns side to side  Turned q 2 hours  Winter care given and barrier cream applied to prevent breakdown  Heels elevated  Structural intactness and normal physiological function of skin and mucous membranes       Problem: Pain:  Goal: Pain level will decrease  Description  Pain level will decrease  Outcome: Ongoing  Note:   Pain level assessed and rated on a 0-10 scale  Assess characteristics of pain  PRN pain medication given per pt request  Non-pharmacological interventions implemented  Report ineffective pain management to physician  Update pt and family of any changes  Pt instructed to call out with new onset of pain  Continue to monitor

## 2019-06-06 LAB
ANION GAP SERPL CALCULATED.3IONS-SCNC: 11 MMOL/L (ref 9–17)
BUN BLDV-MCNC: 42 MG/DL (ref 8–23)
BUN/CREAT BLD: 24 (ref 9–20)
CALCIUM IONIZED: 1.4 MMOL/L (ref 1.13–1.33)
CALCIUM SERPL-MCNC: 10.2 MG/DL (ref 8.6–10.4)
CHLORIDE BLD-SCNC: 100 MMOL/L (ref 98–107)
CO2: 32 MMOL/L (ref 20–31)
CREAT SERPL-MCNC: 1.76 MG/DL (ref 0.5–0.9)
GFR AFRICAN AMERICAN: 35 ML/MIN
GFR NON-AFRICAN AMERICAN: 28 ML/MIN
GFR SERPL CREATININE-BSD FRML MDRD: ABNORMAL ML/MIN/{1.73_M2}
GFR SERPL CREATININE-BSD FRML MDRD: ABNORMAL ML/MIN/{1.73_M2}
GLUCOSE BLD-MCNC: 113 MG/DL (ref 65–105)
GLUCOSE BLD-MCNC: 125 MG/DL (ref 65–105)
GLUCOSE BLD-MCNC: 96 MG/DL (ref 65–105)
GLUCOSE BLD-MCNC: 96 MG/DL (ref 70–99)
GLUCOSE BLD-MCNC: 98 MG/DL (ref 65–105)
POTASSIUM SERPL-SCNC: 4.3 MMOL/L (ref 3.7–5.3)
SODIUM BLD-SCNC: 143 MMOL/L (ref 135–144)

## 2019-06-06 PROCEDURE — 6370000000 HC RX 637 (ALT 250 FOR IP): Performed by: NURSE PRACTITIONER

## 2019-06-06 PROCEDURE — 2700000000 HC OXYGEN THERAPY PER DAY

## 2019-06-06 PROCEDURE — 97110 THERAPEUTIC EXERCISES: CPT | Performed by: NURSE PRACTITIONER

## 2019-06-06 PROCEDURE — 1200000000 HC SEMI PRIVATE

## 2019-06-06 PROCEDURE — 94761 N-INVAS EAR/PLS OXIMETRY MLT: CPT

## 2019-06-06 PROCEDURE — 6360000002 HC RX W HCPCS: Performed by: NURSE PRACTITIONER

## 2019-06-06 PROCEDURE — 6370000000 HC RX 637 (ALT 250 FOR IP): Performed by: INTERNAL MEDICINE

## 2019-06-06 PROCEDURE — 97530 THERAPEUTIC ACTIVITIES: CPT | Performed by: NURSE PRACTITIONER

## 2019-06-06 PROCEDURE — 99232 SBSQ HOSP IP/OBS MODERATE 35: CPT | Performed by: INTERNAL MEDICINE

## 2019-06-06 PROCEDURE — 94640 AIRWAY INHALATION TREATMENT: CPT

## 2019-06-06 PROCEDURE — 80048 BASIC METABOLIC PNL TOTAL CA: CPT

## 2019-06-06 PROCEDURE — 82330 ASSAY OF CALCIUM: CPT

## 2019-06-06 PROCEDURE — 6360000002 HC RX W HCPCS: Performed by: INTERNAL MEDICINE

## 2019-06-06 PROCEDURE — 2580000003 HC RX 258: Performed by: INTERNAL MEDICINE

## 2019-06-06 PROCEDURE — 82947 ASSAY GLUCOSE BLOOD QUANT: CPT

## 2019-06-06 PROCEDURE — 36415 COLL VENOUS BLD VENIPUNCTURE: CPT

## 2019-06-06 RX ADMIN — CARBIDOPA AND LEVODOPA 1 TABLET: 25; 100 TABLET, EXTENDED RELEASE ORAL at 09:09

## 2019-06-06 RX ADMIN — IPRATROPIUM BROMIDE AND ALBUTEROL SULFATE 3 ML: .5; 3 SOLUTION RESPIRATORY (INHALATION) at 14:53

## 2019-06-06 RX ADMIN — PANTOPRAZOLE SODIUM 40 MG: 40 TABLET, DELAYED RELEASE ORAL at 17:02

## 2019-06-06 RX ADMIN — PRAMIPEXOLE DIHYDROCHLORIDE 0.5 MG: 0.25 TABLET ORAL at 09:10

## 2019-06-06 RX ADMIN — ACETAMINOPHEN 650 MG: 325 TABLET ORAL at 13:57

## 2019-06-06 RX ADMIN — HEPARIN SODIUM 5000 UNITS: 5000 INJECTION INTRAVENOUS; SUBCUTANEOUS at 21:48

## 2019-06-06 RX ADMIN — IPRATROPIUM BROMIDE AND ALBUTEROL SULFATE 3 ML: .5; 3 SOLUTION RESPIRATORY (INHALATION) at 21:25

## 2019-06-06 RX ADMIN — SENNA 8.6 MG: 8.6 TABLET, COATED ORAL at 12:08

## 2019-06-06 RX ADMIN — FERROUS SULFATE TAB EC 325 MG (65 MG FE EQUIVALENT) 325 MG: 325 (65 FE) TABLET DELAYED RESPONSE at 09:09

## 2019-06-06 RX ADMIN — AMIODARONE HYDROCHLORIDE 200 MG: 200 TABLET ORAL at 09:10

## 2019-06-06 RX ADMIN — ACETAMINOPHEN 650 MG: 325 TABLET ORAL at 09:10

## 2019-06-06 RX ADMIN — LINAGLIPTIN 2.5 MG: 5 TABLET, FILM COATED ORAL at 09:11

## 2019-06-06 RX ADMIN — GUAIFENESIN 600 MG: 600 TABLET, EXTENDED RELEASE ORAL at 21:49

## 2019-06-06 RX ADMIN — GUAIFENESIN 600 MG: 600 TABLET, EXTENDED RELEASE ORAL at 09:10

## 2019-06-06 RX ADMIN — MEROPENEM 1 G: 1 INJECTION, POWDER, FOR SOLUTION INTRAVENOUS at 01:20

## 2019-06-06 RX ADMIN — MAGNESIUM OXIDE TAB 400 MG (241.3 MG ELEMENTAL MG) 400 MG: 400 (241.3 MG) TAB at 21:49

## 2019-06-06 RX ADMIN — RASAGILINE 1 MG: 0.5 TABLET ORAL at 09:10

## 2019-06-06 RX ADMIN — ATORVASTATIN CALCIUM 20 MG: 20 TABLET, FILM COATED ORAL at 09:10

## 2019-06-06 RX ADMIN — HEPARIN SODIUM 5000 UNITS: 5000 INJECTION INTRAVENOUS; SUBCUTANEOUS at 06:40

## 2019-06-06 RX ADMIN — DOCUSATE SODIUM 100 MG: 100 CAPSULE, LIQUID FILLED ORAL at 21:49

## 2019-06-06 RX ADMIN — MEROPENEM 1 G: 1 INJECTION, POWDER, FOR SOLUTION INTRAVENOUS at 12:08

## 2019-06-06 RX ADMIN — MOMETASONE FUROATE AND FORMOTEROL FUMARATE DIHYDRATE 2 PUFF: 200; 5 AEROSOL RESPIRATORY (INHALATION) at 09:25

## 2019-06-06 RX ADMIN — FERROUS SULFATE TAB EC 325 MG (65 MG FE EQUIVALENT) 325 MG: 325 (65 FE) TABLET DELAYED RESPONSE at 17:02

## 2019-06-06 RX ADMIN — MELATONIN TAB 3 MG 3 MG: 3 TAB at 22:28

## 2019-06-06 RX ADMIN — ACETAMINOPHEN 650 MG: 325 TABLET ORAL at 21:49

## 2019-06-06 RX ADMIN — ROPINIROLE HYDROCHLORIDE 2 MG: 1 TABLET, FILM COATED ORAL at 21:49

## 2019-06-06 RX ADMIN — DOCUSATE SODIUM 100 MG: 100 CAPSULE, LIQUID FILLED ORAL at 09:09

## 2019-06-06 RX ADMIN — PRAMIPEXOLE DIHYDROCHLORIDE 0.5 MG: 0.25 TABLET ORAL at 13:58

## 2019-06-06 RX ADMIN — ASPIRIN 81 MG: 81 TABLET, COATED ORAL at 09:11

## 2019-06-06 RX ADMIN — MAGNESIUM OXIDE TAB 400 MG (241.3 MG ELEMENTAL MG) 400 MG: 400 (241.3 MG) TAB at 09:09

## 2019-06-06 RX ADMIN — SENNA 17.2 MG: 8.6 TABLET, COATED ORAL at 21:49

## 2019-06-06 RX ADMIN — IPRATROPIUM BROMIDE AND ALBUTEROL SULFATE 3 ML: .5; 3 SOLUTION RESPIRATORY (INHALATION) at 09:24

## 2019-06-06 RX ADMIN — PRAMIPEXOLE DIHYDROCHLORIDE 0.5 MG: 0.25 TABLET ORAL at 21:49

## 2019-06-06 RX ADMIN — ROPINIROLE HYDROCHLORIDE 2 MG: 1 TABLET, FILM COATED ORAL at 13:57

## 2019-06-06 RX ADMIN — PANTOPRAZOLE SODIUM 40 MG: 40 TABLET, DELAYED RELEASE ORAL at 06:41

## 2019-06-06 RX ADMIN — CARBIDOPA AND LEVODOPA 1 TABLET: 25; 100 TABLET, EXTENDED RELEASE ORAL at 17:02

## 2019-06-06 RX ADMIN — CARBIDOPA AND LEVODOPA 1 TABLET: 25; 100 TABLET, EXTENDED RELEASE ORAL at 21:49

## 2019-06-06 RX ADMIN — CARBIDOPA AND LEVODOPA 1 TABLET: 25; 100 TABLET, EXTENDED RELEASE ORAL at 12:08

## 2019-06-06 RX ADMIN — HEPARIN SODIUM 5000 UNITS: 5000 INJECTION INTRAVENOUS; SUBCUTANEOUS at 13:58

## 2019-06-06 RX ADMIN — ROPINIROLE HYDROCHLORIDE 2 MG: 1 TABLET, FILM COATED ORAL at 09:10

## 2019-06-06 ASSESSMENT — PAIN DESCRIPTION - DESCRIPTORS
DESCRIPTORS: ACHING

## 2019-06-06 ASSESSMENT — PAIN DESCRIPTION - FREQUENCY
FREQUENCY: INTERMITTENT

## 2019-06-06 ASSESSMENT — PAIN DESCRIPTION - LOCATION
LOCATION: SHOULDER;HIP
LOCATION: SHOULDER

## 2019-06-06 ASSESSMENT — PAIN DESCRIPTION - ORIENTATION
ORIENTATION: RIGHT

## 2019-06-06 ASSESSMENT — PAIN - FUNCTIONAL ASSESSMENT
PAIN_FUNCTIONAL_ASSESSMENT: PREVENTS OR INTERFERES WITH MANY ACTIVE NOT PASSIVE ACTIVITIES
PAIN_FUNCTIONAL_ASSESSMENT: PREVENTS OR INTERFERES SOME ACTIVE ACTIVITIES AND ADLS

## 2019-06-06 ASSESSMENT — PAIN DESCRIPTION - PAIN TYPE
TYPE: SURGICAL PAIN
TYPE: ACUTE PAIN

## 2019-06-06 ASSESSMENT — PAIN SCALES - GENERAL
PAINLEVEL_OUTOF10: 0
PAINLEVEL_OUTOF10: 10
PAINLEVEL_OUTOF10: 10
PAINLEVEL_OUTOF10: 9
PAINLEVEL_OUTOF10: 10
PAINLEVEL_OUTOF10: 9
PAINLEVEL_OUTOF10: 10

## 2019-06-06 ASSESSMENT — PAIN DESCRIPTION - ONSET
ONSET: ON-GOING
ONSET: ON-GOING
ONSET: UNABLE TO ASSESS

## 2019-06-06 ASSESSMENT — PAIN DESCRIPTION - PROGRESSION: CLINICAL_PROGRESSION: NOT CHANGED

## 2019-06-06 NOTE — CARE COORDINATION
Social Work- [employer] is out of network. Sent referral to [facility].  [address]

## 2019-06-06 NOTE — PLAN OF CARE
Fall precautions in place Bed alarm in use Is disoriented to time and situation   Turning every 2 hours Decub dressing changed today Heels elevated off bed  Taking tylenol for rt arm pain  Afebrile Incontinent of urine

## 2019-06-06 NOTE — PROGRESS NOTES
Occupational Therapy  Facility/Department: [facility]  Daily Treatment Note  NAME: Genaro Angulo  : 1945  MRN: [id number]    Date of Service: 2019    Discharge Recommendations:  [address]    Patient would benefit from SNF for continued occupational therapy to increase independence with  ADL of bathing, dressing, toileting and grooming. Writer recommending SNF placement for for activity tolerance and strength which will increase independence with ADL's coordinated with bed mobility and chair transfers. Continued skilled OT services to address decreased safety awareness with ADL and IADL tasks and for education and increased independence with DME and AE for fall prevention and ec/ws techniques prior to d/c home. Assessment   Performance deficits / Impairments: Decreased functional mobility ; Decreased cognition;Decreased endurance;Decreased ADL status; Decreased strength;Decreased ROM; Decreased balance;Decreased safe awareness  Prognosis: Fair  Patient Education: Educated patient on pursed lip breathing to increase control of breathing. Initiated by breathing through the nose and blowing out with pursed lip to increase O2 into lungs. REQUIRES OT FOLLOW UP: Yes  Activity Tolerance  Activity Tolerance: Patient limited by pain  Safety Devices  Safety Devices in place: Yes  Type of devices: Call light within reach;Nurse notified; Patient at risk for falls; Left in bed;Bed alarm in place; All fall risk precautions in place         Patient Diagnosis(es): The primary encounter diagnosis was Hypercalcemia. Diagnoses of Urinary tract infection without hematuria, site unspecified, Altered mental status, unspecified altered mental status type, and Acute kidney injury superimposed on chronic kidney disease ([address] were also pertinent to this visit.       has a past medical history of Acute on chronic diastolic congestive heart failure ([address] Anesthesia complication, Asthma, Atrial fibrillation ([facility] Cataracts, bilateral, Cellulitis, Cerebral artery occlusion with cerebral infarction ([facility] Chronic kidney disease, Constipation, COPD (chronic obstructive pulmonary disease) ([facility] Difficult intravenous access, Diverticulosis, DM2 (diabetes mellitus, type 2) ([facility] Full dentures, GERD (gastroesophageal reflux disease), Headache(784.0), [place] (hard of hearing), Hyperlipidemia, Hyperplastic polyp of intestine, Hypertension, Impaired ambulation, Kidney stone, Morbid obesity with BMI of 40.0-44.9, adult ([facility] ECTOR on CPAP, Osteoarthritis, Parkinson disease ([facility] Restless leg syndrome, Spinal stenosis in cervical region, Type II or unspecified type diabetes mellitus without mention of complication, not stated as uncontrolled, Unspecified sleep apnea, Urethral caruncle, and Wears glasses. has a past surgical history that includes Cervical disc surgery (2012); Appendectomy; Tonsillectomy and adenoidectomy (1953); hernia repair (1999); eye surgery (Bilateral, 2017); Cervical spine surgery (5/31/13); laminectomy (05/31/2013); Upper gastrointestinal endoscopy (12/28/2016); Colonoscopy (2009); Colonoscopy (12/29/2016); Colonoscopy (12/30/2016); Colonoscopy (04/25/2017); pr colsc flx w/removal lesion by hot bx forceps (N/A, 4/25/2017); Cystorrhaphy (04/04/2018); pr cystourethroscopy,biopsies (N/A, 4/4/2018); bronchoscopy (06/05/2018); pr [facility] incl fluor gdnce dx w/cell washg spx (N/A, 6/5/2018); Upper gastrointestinal endoscopy (08/29/2018); Upper gastrointestinal endoscopy (N/A, 8/29/2018); Shoulder Closed Reduction (Right, 05/28/2019); and shoulder fracture surgery (Right, 5/28/2019). Restrictions  Restrictions/Precautions  Restrictions/Precautions: General Precautions, Fall Risk, Contact Precautions, Weight Bearing  Upper Extremity Weight Bearing Restrictions  Right Upper Extremity Weight Bearing: Non Weight Bearing  Position Activity Restriction  Other position/activity restrictions:  Up with assist, bed/chair

## 2019-06-06 NOTE — PROGRESS NOTES
106* 96   BUN 57*  --  52* 42*   CREATININE 1.97*  --  1.83* 1.76*   MG 2.1  --   --   --    ANIONGAP 10  --  9 11   LABGLOM 25*  --  27* 28*   GFRAA 30*  --  33* 35*   CALCIUM 13.4*  --  12.1* 10.2   CAION 1.76*  --   --  1.40*   PROBNP 1,240*  --   --   --    TROPHS 36* 33*  --   --    MYOGLOBIN 198*  --   --   --      Recent Labs     06/04/19  1843 06/04/19  2335 06/05/19  0536 06/05/19  0625 06/05/19  1116 06/05/19  1638 06/05/19 2054 06/06/19  0615   PROT 7.0  --  6.3*  --   --   --   --   --    LABALBU 3.6  --  3.2*  --   --   --   --   --    AST 17  --  17  --   --   --   --   --    ALT <5*  --  <5*  --   --   --   --   --    ALKPHOS 75  --  67  --   --   --   --   --    BILITOT 0.52  --  0.43  --   --   --   --   --    BILIDIR 0.14  --   --   --   --   --   --   --    AMMONIA 23  --   --   --   --   --   --   --    POCGLU  --  100  --  98 74 89 111* 96         Lab Results   Component Value Date/Time    SPECIAL L ARM 8ML 06/04/2019 06:32 PM    SPECIAL L ARM 10ML 06/04/2019 06:32 PM     Lab Results   Component Value Date/Time    CULTURE NO GROWTH 2 DAYS 06/04/2019 06:32 PM    CULTURE NO GROWTH 2 DAYS 06/04/2019 06:32 PM       Lab Results   Component Value Date    POCPH 7.50 06/04/2019    POCPCO2 50 06/04/2019    POCPO2 151 06/04/2019    POCHCO3 38.8 06/04/2019    NBEA NOT REPORTED 06/04/2019    PBEA 16 06/04/2019    XTE9IYW 40 06/04/2019    ZEII7CXW 99 06/04/2019    FIO2 36.0 06/04/2019       Radiology:    Ct Head Wo Contrast    Result Date: 6/4/2019  No acute intracranial abnormality. No change from prior exams. Right maxillary sinusitis. Fluid in bilateral mastoids. Xr Chest Portable    Result Date: 6/4/2019  No acute findings in the chest.     Xr Chest Portable    Result Date: 5/30/2019  Hypoventilatory somewhat rotated AP portable chest x-ray without obvious acute cardiopulmonary disease; some worsening of retrocardiac density a consideration.  RECOMMENDATION: Follow-up chest x-ray, preferably

## 2019-06-07 ENCOUNTER — HOSPITAL ENCOUNTER (OUTPATIENT)
Dept: INFUSION THERAPY | Facility: MEDICAL CENTER | Age: 74
End: 2019-06-07
Payer: COMMERCIAL

## 2019-06-07 LAB
ANION GAP SERPL CALCULATED.3IONS-SCNC: 11 MMOL/L (ref 9–17)
BUN BLDV-MCNC: 36 MG/DL (ref 8–23)
BUN/CREAT BLD: 20 (ref 9–20)
CALCIUM IONIZED: 1.33 MMOL/L (ref 1.13–1.33)
CALCIUM SERPL-MCNC: 9.5 MG/DL (ref 8.6–10.4)
CHLORIDE BLD-SCNC: 100 MMOL/L (ref 98–107)
CO2: 31 MMOL/L (ref 20–31)
CREAT SERPL-MCNC: 1.78 MG/DL (ref 0.5–0.9)
GFR AFRICAN AMERICAN: 34 ML/MIN
GFR NON-AFRICAN AMERICAN: 28 ML/MIN
GFR SERPL CREATININE-BSD FRML MDRD: ABNORMAL ML/MIN/{1.73_M2}
GFR SERPL CREATININE-BSD FRML MDRD: ABNORMAL ML/MIN/{1.73_M2}
GLUCOSE BLD-MCNC: 110 MG/DL (ref 65–105)
GLUCOSE BLD-MCNC: 122 MG/DL (ref 65–105)
GLUCOSE BLD-MCNC: 89 MG/DL (ref 65–105)
GLUCOSE BLD-MCNC: 90 MG/DL (ref 65–105)
GLUCOSE BLD-MCNC: 94 MG/DL (ref 70–99)
POTASSIUM SERPL-SCNC: 4.2 MMOL/L (ref 3.7–5.3)
SODIUM BLD-SCNC: 142 MMOL/L (ref 135–144)
SODIUM,UR: 87 MMOL/L
TOTAL PROTEIN, URINE: 23 MG/DL

## 2019-06-07 PROCEDURE — 1200000000 HC SEMI PRIVATE

## 2019-06-07 PROCEDURE — 80048 BASIC METABOLIC PNL TOTAL CA: CPT

## 2019-06-07 PROCEDURE — 2700000000 HC OXYGEN THERAPY PER DAY

## 2019-06-07 PROCEDURE — 82330 ASSAY OF CALCIUM: CPT

## 2019-06-07 PROCEDURE — 99212 OFFICE O/P EST SF 10 MIN: CPT

## 2019-06-07 PROCEDURE — 94640 AIRWAY INHALATION TREATMENT: CPT

## 2019-06-07 PROCEDURE — 6370000000 HC RX 637 (ALT 250 FOR IP): Performed by: NURSE PRACTITIONER

## 2019-06-07 PROCEDURE — 97110 THERAPEUTIC EXERCISES: CPT

## 2019-06-07 PROCEDURE — 2580000003 HC RX 258: Performed by: INTERNAL MEDICINE

## 2019-06-07 PROCEDURE — 97530 THERAPEUTIC ACTIVITIES: CPT | Performed by: NURSE PRACTITIONER

## 2019-06-07 PROCEDURE — 84156 ASSAY OF PROTEIN URINE: CPT

## 2019-06-07 PROCEDURE — 51701 INSERT BLADDER CATHETER: CPT

## 2019-06-07 PROCEDURE — 84300 ASSAY OF URINE SODIUM: CPT

## 2019-06-07 PROCEDURE — 36415 COLL VENOUS BLD VENIPUNCTURE: CPT

## 2019-06-07 PROCEDURE — 6360000002 HC RX W HCPCS: Performed by: NURSE PRACTITIONER

## 2019-06-07 PROCEDURE — 6370000000 HC RX 637 (ALT 250 FOR IP): Performed by: INTERNAL MEDICINE

## 2019-06-07 PROCEDURE — 51798 US URINE CAPACITY MEASURE: CPT

## 2019-06-07 PROCEDURE — 94761 N-INVAS EAR/PLS OXIMETRY MLT: CPT

## 2019-06-07 PROCEDURE — 82947 ASSAY GLUCOSE BLOOD QUANT: CPT

## 2019-06-07 PROCEDURE — 6360000002 HC RX W HCPCS: Performed by: INTERNAL MEDICINE

## 2019-06-07 PROCEDURE — 99232 SBSQ HOSP IP/OBS MODERATE 35: CPT | Performed by: INTERNAL MEDICINE

## 2019-06-07 PROCEDURE — 97530 THERAPEUTIC ACTIVITIES: CPT

## 2019-06-07 PROCEDURE — 2580000003 HC RX 258: Performed by: NURSE PRACTITIONER

## 2019-06-07 PROCEDURE — 97110 THERAPEUTIC EXERCISES: CPT | Performed by: NURSE PRACTITIONER

## 2019-06-07 RX ADMIN — CARBIDOPA AND LEVODOPA 1 TABLET: 25; 100 TABLET, EXTENDED RELEASE ORAL at 17:45

## 2019-06-07 RX ADMIN — ROPINIROLE HYDROCHLORIDE 2 MG: 1 TABLET, FILM COATED ORAL at 14:53

## 2019-06-07 RX ADMIN — CARBIDOPA AND LEVODOPA 1 TABLET: 25; 100 TABLET, EXTENDED RELEASE ORAL at 14:54

## 2019-06-07 RX ADMIN — ACETAMINOPHEN 650 MG: 325 TABLET ORAL at 15:17

## 2019-06-07 RX ADMIN — PANTOPRAZOLE SODIUM 40 MG: 40 TABLET, DELAYED RELEASE ORAL at 17:49

## 2019-06-07 RX ADMIN — ACETAMINOPHEN 650 MG: 325 TABLET ORAL at 09:14

## 2019-06-07 RX ADMIN — GUAIFENESIN 600 MG: 600 TABLET, EXTENDED RELEASE ORAL at 09:12

## 2019-06-07 RX ADMIN — ATORVASTATIN CALCIUM 20 MG: 20 TABLET, FILM COATED ORAL at 09:10

## 2019-06-07 RX ADMIN — RASAGILINE 1 MG: 0.5 TABLET ORAL at 09:12

## 2019-06-07 RX ADMIN — IPRATROPIUM BROMIDE AND ALBUTEROL SULFATE 3 ML: .5; 3 SOLUTION RESPIRATORY (INHALATION) at 20:45

## 2019-06-07 RX ADMIN — PRAMIPEXOLE DIHYDROCHLORIDE 0.5 MG: 0.25 TABLET ORAL at 09:12

## 2019-06-07 RX ADMIN — ROPINIROLE HYDROCHLORIDE 2 MG: 1 TABLET, FILM COATED ORAL at 21:54

## 2019-06-07 RX ADMIN — AMIODARONE HYDROCHLORIDE 200 MG: 200 TABLET ORAL at 09:10

## 2019-06-07 RX ADMIN — SENNA 17.2 MG: 8.6 TABLET, COATED ORAL at 21:54

## 2019-06-07 RX ADMIN — LINAGLIPTIN 2.5 MG: 5 TABLET, FILM COATED ORAL at 09:12

## 2019-06-07 RX ADMIN — ACETAMINOPHEN 650 MG: 325 TABLET ORAL at 21:54

## 2019-06-07 RX ADMIN — GUAIFENESIN 600 MG: 600 TABLET, EXTENDED RELEASE ORAL at 21:55

## 2019-06-07 RX ADMIN — Medication 10 ML: at 21:55

## 2019-06-07 RX ADMIN — DOCUSATE SODIUM 100 MG: 100 CAPSULE, LIQUID FILLED ORAL at 21:55

## 2019-06-07 RX ADMIN — HEPARIN SODIUM 5000 UNITS: 5000 INJECTION INTRAVENOUS; SUBCUTANEOUS at 21:54

## 2019-06-07 RX ADMIN — FERROUS SULFATE TAB EC 325 MG (65 MG FE EQUIVALENT) 325 MG: 325 (65 FE) TABLET DELAYED RESPONSE at 09:11

## 2019-06-07 RX ADMIN — PRAMIPEXOLE DIHYDROCHLORIDE 0.5 MG: 0.25 TABLET ORAL at 14:55

## 2019-06-07 RX ADMIN — PRAMIPEXOLE DIHYDROCHLORIDE 0.5 MG: 0.25 TABLET ORAL at 22:04

## 2019-06-07 RX ADMIN — MOMETASONE FUROATE AND FORMOTEROL FUMARATE DIHYDRATE 2 PUFF: 200; 5 AEROSOL RESPIRATORY (INHALATION) at 20:45

## 2019-06-07 RX ADMIN — MAGNESIUM OXIDE TAB 400 MG (241.3 MG ELEMENTAL MG) 400 MG: 400 (241.3 MG) TAB at 21:54

## 2019-06-07 RX ADMIN — ACETAMINOPHEN 650 MG: 325 TABLET ORAL at 03:47

## 2019-06-07 RX ADMIN — DOCUSATE SODIUM 100 MG: 100 CAPSULE, LIQUID FILLED ORAL at 09:11

## 2019-06-07 RX ADMIN — ROPINIROLE HYDROCHLORIDE 2 MG: 1 TABLET, FILM COATED ORAL at 09:13

## 2019-06-07 RX ADMIN — MAGNESIUM OXIDE TAB 400 MG (241.3 MG ELEMENTAL MG) 400 MG: 400 (241.3 MG) TAB at 09:12

## 2019-06-07 RX ADMIN — PANTOPRAZOLE SODIUM 40 MG: 40 TABLET, DELAYED RELEASE ORAL at 05:40

## 2019-06-07 RX ADMIN — HEPARIN SODIUM 5000 UNITS: 5000 INJECTION INTRAVENOUS; SUBCUTANEOUS at 05:40

## 2019-06-07 RX ADMIN — HEPARIN SODIUM 5000 UNITS: 5000 INJECTION INTRAVENOUS; SUBCUTANEOUS at 14:56

## 2019-06-07 RX ADMIN — CARBIDOPA AND LEVODOPA 1 TABLET: 25; 100 TABLET, EXTENDED RELEASE ORAL at 21:55

## 2019-06-07 RX ADMIN — FERROUS SULFATE TAB EC 325 MG (65 MG FE EQUIVALENT) 325 MG: 325 (65 FE) TABLET DELAYED RESPONSE at 17:45

## 2019-06-07 RX ADMIN — CARBIDOPA AND LEVODOPA 1 TABLET: 25; 100 TABLET, EXTENDED RELEASE ORAL at 09:11

## 2019-06-07 RX ADMIN — IPRATROPIUM BROMIDE AND ALBUTEROL SULFATE 3 ML: .5; 3 SOLUTION RESPIRATORY (INHALATION) at 14:30

## 2019-06-07 RX ADMIN — MEROPENEM 1 G: 1 INJECTION, POWDER, FOR SOLUTION INTRAVENOUS at 00:26

## 2019-06-07 ASSESSMENT — PAIN SCALES - GENERAL
PAINLEVEL_OUTOF10: 8
PAINLEVEL_OUTOF10: 3
PAINLEVEL_OUTOF10: 10
PAINLEVEL_OUTOF10: 6
PAINLEVEL_OUTOF10: 3
PAINLEVEL_OUTOF10: 7
PAINLEVEL_OUTOF10: 2
PAINLEVEL_OUTOF10: 10

## 2019-06-07 ASSESSMENT — PAIN DESCRIPTION - FREQUENCY: FREQUENCY: INTERMITTENT

## 2019-06-07 ASSESSMENT — PAIN DESCRIPTION - ORIENTATION
ORIENTATION: RIGHT

## 2019-06-07 ASSESSMENT — PAIN DESCRIPTION - PROGRESSION: CLINICAL_PROGRESSION: GRADUALLY IMPROVING

## 2019-06-07 ASSESSMENT — PAIN DESCRIPTION - PAIN TYPE
TYPE: SURGICAL PAIN

## 2019-06-07 ASSESSMENT — PAIN DESCRIPTION - DESCRIPTORS: DESCRIPTORS: ACHING

## 2019-06-07 ASSESSMENT — PAIN - FUNCTIONAL ASSESSMENT: PAIN_FUNCTIONAL_ASSESSMENT: PREVENTS OR INTERFERES SOME ACTIVE ACTIVITIES AND ADLS

## 2019-06-07 ASSESSMENT — PAIN DESCRIPTION - LOCATION
LOCATION: SHOULDER

## 2019-06-07 ASSESSMENT — PAIN DESCRIPTION - ONSET: ONSET: ON-GOING

## 2019-06-07 NOTE — PROGRESS NOTES
Dr Pino Hopes in and spoke with pt dgt. Orders received regarding voiding. Dgt concerned pt has been hallucinating - none by writer noticed. Dgt concerned about pt decreased appetite - sent dietician in to see her and supplements ordered, started for lunch and pt consumed.

## 2019-06-07 NOTE — PROGRESS NOTES
Pt currently in chair for lunch. Pt has only dribbled thus far but does have feeling of needing to void. Plan to rescan after returning to bed after lunch.

## 2019-06-07 NOTE — PROGRESS NOTES
K 4.4  --  4.5 4.3 4.2   CL 94*  --  99 100 100   CO2 37*  --  34* 32* 31   GLUCOSE 104*  --  106* 96 94   BUN 57*  --  52* 42* 36*   CREATININE 1.97*  --  1.83* 1.76* 1.78*   MG 2.1  --   --   --   --    ANIONGAP 10  --  9 11 11   LABGLOM 25*  --  27* 28* 28*   GFRAA 30*  --  33* 35* 34*   CALCIUM 13.4*  --  12.1* 10.2 9.5   CAION 1.76*  --   --  1.40*  --    PROBNP 1,240*  --   --   --   --    TROPHS 36* 33*  --   --   --    MYOGLOBIN 198*  --   --   --   --      Recent Labs     06/04/19  1843  06/05/19  0536  06/05/19  2054 06/06/19  0615 06/06/19  1115 06/06/19  1644 06/06/19  2119 06/07/19  0620   PROT 7.0  --  6.3*  --   --   --   --   --   --   --    LABALBU 3.6  --  3.2*  --   --   --   --   --   --   --    AST 17  --  17  --   --   --   --   --   --   --    ALT <5*  --  <5*  --   --   --   --   --   --   --    ALKPHOS 75  --  67  --   --   --   --   --   --   --    BILITOT 0.52  --  0.43  --   --   --   --   --   --   --    BILIDIR 0.14  --   --   --   --   --   --   --   --   --    AMMONIA 23  --   --   --   --   --   --   --   --   --    POCGLU  --    < >  --    < > 111* 96 98 125* 113* 90    < > = values in this interval not displayed. Lab Results   Component Value Date/Time    SPECIAL L ARM 8ML 06/04/2019 06:32 PM    SPECIAL L ARM 10ML 06/04/2019 06:32 PM     Lab Results   Component Value Date/Time    CULTURE NO GROWTH 3 DAYS 06/04/2019 06:32 PM    CULTURE NO GROWTH 3 DAYS 06/04/2019 06:32 PM       Lab Results   Component Value Date    POCPH 7.50 06/04/2019    POCPCO2 50 06/04/2019    POCPO2 151 06/04/2019    POCHCO3 38.8 06/04/2019    NBEA NOT REPORTED 06/04/2019    PBEA 16 06/04/2019    NSU9GNO 40 06/04/2019    NSWI3ILL 99 06/04/2019    FIO2 36.0 06/04/2019       Radiology:    Ct Head Wo Contrast    Result Date: 6/4/2019  No acute intracranial abnormality. No change from prior exams. Right maxillary sinusitis. Fluid in bilateral mastoids.      Xr Chest Portable    Result Date: 6/4/2019  No acute findings in the chest.     Xr Chest Portable    Result Date: 5/30/2019  Hypoventilatory somewhat rotated AP portable chest x-ray without obvious acute cardiopulmonary disease; some worsening of retrocardiac density a consideration. RECOMMENDATION: Follow-up chest x-ray, preferably two-view examination in the department if possible.        Physical Examination:        General appearance:  alert, cooperative and no distress  Mental Status:  oriented to person, place and time and normal affect  Lungs:  clear to auscultation bilaterally, normal effort  Heart:  regular rate and rhythm  Abdomen:  soft, nontender, nondistended, normal bowel sounds  Extremities:  no edema, redness, tenderness in the calves  Skin:  no gross lesions, rashes, induration    Assessment:        Primary Problem  Hypercalcemia    Active Hospital Problems    Diagnosis Date Noted    Metabolic encephalopathy [Z20.39] 06/05/2019    Urinary tract infection without hematuria [N39.0]     Hypercalcemia [E83.52]     History of ESBL E. coli infection [Z86.19]     Chronic diastolic congestive heart failure (HCC) [I50.32]     Acute kidney injury superimposed on chronic kidney disease ([facility] [N17.9, N18.9]     ECTOR on CPAP [G47.33, Z99.89]     Type 2 diabetes mellitus with complication, without long-term current use of insulin (HCC) [E11.8]     Paroxysmal atrial fibrillation ([facility] [I48.0]     Essential hypertension [I10]        Plan:        - Labs, imaging, previous urine cultures reviewed  - AMS due to calcium supplementation  - Check PTH - appropriately suppressed  - Monitor calcium daily  - Discontinue meropenem given hx of ESBL, follow up urine cultures - no growth  - RT aerosol / NIV protocol   - Supplemental home O2  - Aranesp per regular schedule  - Wound care  - Continue selected home medications  - PT/OT   - SW/CM for dc planning  - DC planning in progress      Oskar Deleon MD  6/7/2019  10:46 AM

## 2019-06-07 NOTE — DISCHARGE INSTR - COC
encephalopathy G93.41       Isolation/Infection:   Isolation          Contact        Patient Infection Status     Infection Encounter Level? Onset Date Added Added By Resolved Resolved By Review Date    ESBL (Extended Spectrum Beta Lactamase) No  03/10/18 Johnnie Carroll RN       E. Coli - urine 11/2018            Nurse Assessment:  Last Vital Signs: BP (!) 134/48   Pulse 73   Temp 97.2 °F (36.2 °C) (Oral)   Resp 16   Ht 5' (1.524 m)   Wt 189 lb 14.4 oz (86.1 kg)   LMP 04/03/2004 (Within Years)   SpO2 96%   BMI 37.09 kg/m²     Last documented pain score (0-10 scale): Pain Level: 9  Last Weight:   Wt Readings from Last 1 Encounters:   06/06/19 189 lb 14.4 oz (86.1 kg)     Mental Status:  oriented and alert    IV Access:  - None    Nursing Mobility/ADLs:  Walking   Assisted  Transfer  Assisted  Bathing  Dependent  Dressing  Dependent  Toileting  Assisted  Feeding  Independent  Med Admin  Assisted  Med Delivery   whole    Wound Care Documentation and Therapy:  Wound 05/30/19 Coccyx Upper small \"slit\" toward bottom of coccyx/present on admission (Active)   Wound Image   6/5/2019  4:14 PM   Wound Pressure Unstageable 6/5/2019 10:00 PM   Dressing Status Clean;Dry; Intact 6/6/2019  8:00 AM   Dressing Changed Changed/New 6/6/2019  8:00 AM   Dressing/Treatment Alginate with Ag;Hydrocolloid 6/6/2019  8:00 AM   Wound Cleansed Rinsed/Irrigated with saline 6/6/2019  8:00 AM   Dressing Change Due 06/07/19 6/5/2019  4:14 PM   Wound Length (cm) 5.6 cm 6/5/2019  4:14 PM   Wound Width (cm) 1.5 cm 6/5/2019  4:14 PM   Wound Depth (cm) 1 cm 6/5/2019  4:14 PM   Wound Surface Area (cm^2) 8.4 cm^2 6/5/2019  4:14 PM   Wound Volume (cm^3) 8.4 cm^3 6/5/2019  4:14 PM   Wound Assessment Yellow 6/6/2019  8:00 AM   Drainage Amount None 6/6/2019  8:00 AM   Drainage Description Serosanguinous 6/5/2019  4:14 PM   Odor None 6/6/2019  8:00 AM   Winter-wound Assessment Maceration;Red;Painful 6/6/2019  8:00 AM   Red%Wound Bed 50 6/5/2019  4:14 PM Restriction: no  Last Modified Barium Swallow with Video (Video Swallowing Test): not done    Treatments at the Time of Hospital Discharge:   Respiratory Treatments: duoneb nebulizer TID and dulera inhaler BID, nightly Bipap  Oxygen Therapy:  is on oxygen at 2 liters L/min per nasal cannula. Ventilator:    - No ventilator support    Rehab Therapies: Physical Therapy and Occupational Therapy  Weight Bearing Status/Restrictions: No weight bearing restirctions  Other Medical Equipment (for information only, NOT a DME order):  walker and bath bench  Other Treatments: nightly cpap w/ 3 liters oxygen-patient has home c-pap with her, Accuchecks AC and HS, sling to rt arm, elevate heels off bed,   Coccyx decub: cleanse with normal saline, then alginate with silver,then cover with hydrocolloid dressing/ change every 2 days-next due Sunday 6/9/19, elevate heels off bed at all times-decub to right heel, please no briefs d/t pressure redness to groin area, straight cath as needed for urinary retention greater than 400ml's,history of E-coli in urine    Patient's personal belongings (please select all that are sent with patient):  Glasses, Dentures upper    RN SIGNATURE:  Electronically signed by Akhil Lopes RN on 6/8/19 at 11:59 AM    CASE MANAGEMENT/SOCIAL WORK SECTION    Inpatient Status Date: ***    Readmission Risk Assessment Score:  Readmission Risk              Risk of Unplanned Readmission:        48           Discharging to Facility/ Agency   · Name: [facility]  · Address:  · Phone:[phone]  · Fax:[phone]    Dialysis Facility (if applicable)   · Name:  · Address:  · Dialysis Schedule:  · Phone:  · Fax:    / signature: Electronically signed by NILDA March on 6/7/19 at 1:21 PM    PHYSICIAN SECTION    Prognosis: Fair    Condition at Discharge: Stable    Rehab Potential (if transferring to Rehab):  Fair    Recommended Labs or Other Treatments After Discharge: Physician Certification: I certify the above information and transfer of Odette Travis  is necessary for the continuing treatment of the diagnosis listed and that she requires [place] for less 30 days.      Update Admission H&P: No change in H&P    PHYSICIAN SIGNATURE:  Electronically signed by Jose Costa MD on 6/7/19 at 10:49 AM

## 2019-06-07 NOTE — PROGRESS NOTES
[address] [address] Continence Nurse  Follow up      NAME:  Romain Mckeon  MEDICAL RECORD NUMBER:  [id number]  AGE: 72 y.o. GENDER: female  : 1945  TODAY'S DATE:  2019    Subjective:   Reason for WOCN Evaluation and Assessment: coccygeal unstageable pressure injury, left medial thigh ST 1 pressure injury, right heel deep tissue injury       Romain Mckeon is a 72 y.o. female referred by:   [x] Physician  [] Nursing  [] Other:      Wound Identification:  Wound Type: pressure  Contributing Factors: diabetes, chronic pressure, decreased mobility, shear force, obesity, decreased tissue oxygenation, incontinence of stool and incontinence of urine                         Left thigh injury has resolved with removal of briefs. PAST MEDICAL HISTORY        Diagnosis Date    Acute on chronic diastolic congestive heart failure ([facility]     Anesthesia complication     DIFFICULTY WAKING OP    Asthma     Atrial fibrillation ([facility] 2013    Cataracts, bilateral     Cellulitis     BILAT LOWER LEGS-PT STATES IS IMPROVING    Cerebral artery occlusion with cerebral infarction [facility])     Last stroke was 2017 w/no deficits-TOTAL OF 3    Chronic kidney disease 2013    NOT ON DIALYSIS YET    Constipation     CHRONIC    COPD (chronic obstructive pulmonary disease) ([facility] 2008    PT. SEES DR. BECK. Home O2 at 2-3L/NC .     Difficult intravenous access     VEINS ROLL    Diverticulosis     DM2 (diabetes mellitus, type 2) ([facility] 2008    Full dentures     FULL UPPER ONLY    GERD (gastroesophageal reflux disease) 2008    ON RX    Headache(784.0)     [place] (hard of hearing)     HAS HEARING AIDS BUT DOES NOT WEAR    Hyperlipidemia     Hyperplastic polyp of intestine     Hypertension     Impaired ambulation     USES TRANFER CHAIR WALKER OR CANE    Kidney stone 2018    PRESENTLY-SMALL    Morbid obesity with BMI of 40.0-44.9, adult ([facility] 2016    ECTOR on CPAP 2008    USES C-PAP NIGHTLY    complication, without long-term current use of insulin ([address]    Spinal stenosis in cervical region    Hypomagnesemia    Hyperphosphaturia    Hypocalcemia    Parkinson's disease ([address]    Acute renal failure (HCC)    Acute cystitis with hematuria    Altered mental status    Iron deficiency anemia due to chronic blood loss    Morbid obesity with BMI of 40.0-44.9, adult (HCC)    Hyperplastic polyp of intestine    Diverticulosis    Panlobular emphysema (HCC)    Rapid atrial fibrillation (HCC)    CKD (chronic kidney disease) stage 3, GFR 30-59 ml/min (HCC)    KRISTIN (acute kidney injury) ([address]    Anemia in chronic kidney disease    Chronic respiratory failure with hypoxia and hypercapnia (HCC)    Acute kidney injury superimposed on chronic kidney disease (HCC)    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 coli UTI (urinary tract infection)    Nephrolithiasis    Candidal intertrigo    Venous insufficiency    Malabsorption    Anemia of chronic renal failure, stage 4 (severe) (HCC)    Iron deficiency    Coronary atherosclerosis    COPD exacerbation (HCC)    Restless leg syndrome    SIRS (systemic inflammatory response syndrome) (HCC)    Nausea and vomiting in adult    Bacterial UTI    Odynophagia    Esophageal dysphagia    Candida esophagitis (HCC)    Sepsis due to urinary tract infection (HCC)    Chronic respiratory failure (HCC)    Chronic diastolic congestive heart failure (HCC)    History of ESBL E. coli infection    Stage 4 chronic kidney disease (HCC)    Fever    Macrocytic anemia    Hypercalcemia    Monoclonal gammopathy of undetermined significance    Acute nonintractable headache    Anemia of chronic renal failure, stage 4 (severe) (HCC)    Traumatic closed displaced fracture of proximal end of right humerus, initial encounter    Urinary tract infection without hematuria    Metabolic encephalopathy       Measurements:     06/07/19 0840   Wound 06/05/19 Heel Right   Date First Assessed/Time First Assessed: 06/05/19 1614   Present on Hospital Admission: Yes  Primary Wound Type: Pressure Injury  Location: Heel  Wound Location Orientation: Right   Wound Image    Wound Deep tissue/Injury   Dressing/Treatment   (off load pressure)   Wound Assessment Dry; Intact;Maroon;Fluid filled blister   Drainage Amount None   Odor None   Winter-wound Assessment Dry; Intact   Wound 05/30/19 Coccyx Upper small \"slit\" toward bottom of coccyx/present on admission   Date First Assessed/Time First Assessed: 05/30/19 1055   Primary Wound Type: Pressure Injury  Location: Coccyx  Wound Location Orientation: Upper  Wound Description (Comments): small \"slit\" toward bottom of coccyx/present on admission   Wound Image    Wound Pressure Unstageable   Dressing Status Changed   Dressing Changed Changed/New   Dressing/Treatment Alginate with Ag;Hydrocolloid   Wound Cleansed Rinsed/Irrigated with saline   Dressing Change Due 06/09/19   Wound Assessment Slough;Pink   Drainage Amount Moderate   Drainage Description Serosanguinous   Odor None   Winter-wound Assessment Red   Red%Wound Bed 10   Yellow%Wound Bed 90     Improved wound condition. Response to treatment:  Well tolerated by patient. Plan:     Plan of Care: Wound 06/05/19 Heel Right-Dressing/Treatment: (P) (offload pressure)  Wound 05/30/19 Coccyx Upper small \"slit\" toward bottom of coccyx/present on admission-Dressing/Treatment: (P) Alginate with Ag, Hydrocolloid  Incision 05/28/19 Shoulder Right-Dressing/Treatment: Open to air  Wound 06/05/19 Thigh Medial;Left-Dressing/Treatment: (P) (offload pressure, remove brief)   Turn every 2 hours  Float heels off of bed with pillows under calves     Routine incontinence care with incontinence barrier cloths and zinc oxide cream.   Apply zinc oxide cream BID and prn incontinence. Moisture wicking under pads; no briefs     Static air overlay.  Check inflation each shift by sliding hand under the air

## 2019-06-07 NOTE — PLAN OF CARE
Eb Membreno RN  Outcome: Ongoing  6/6/2019 1544 by Dora Ewing RN  Outcome: Ongoing  Goal: Ability to reestablish a normal urinary elimination pattern will improve - after catheter removal  Description  Ability to reestablish a normal urinary elimination pattern will improve  Outcome: Ongoing  Goal: Complications related to the disease process, condition or treatment will be avoided or minimized  Description  Complications related to the disease process, condition or treatment will be avoided or minimized  Outcome: Ongoing     Problem: Serum Glucose Level - Abnormal:  Goal: Ability to maintain appropriate glucose levels will improve  Description  Ability to maintain appropriate glucose levels will improve  Outcome: Ongoing

## 2019-06-07 NOTE — CARE COORDINATION
Social Work-[place] received precert and will admit tomorrow. Arranged [facility] to transport at 200. Orders will need to be faxed to [phone]. Report should be called to [phone]. Notified . He is agreeable with dc plans.  Letitia Bruno

## 2019-06-07 NOTE — PROGRESS NOTES
Patient/Caregiver Education & Training, Self-Care / ADL, Equipment Evaluation, Education, & procurement, Safety Education & Training, Endurance Training, Cognitive Reorientation    AM-PAC Score        AM-PAC Inpatient Daily Activity Raw Score: 8 (06/07/19 1023)  AM-PAC Inpatient ADL T-Scale Score : 22.86 (06/07/19 1023)  ADL Inpatient CMS 0-100% Score: 85.69 (06/07/19 1023)  ADL Inpatient CMS G-Code Modifier : CM (06/07/19 1023)    Goals  Short term goals  Time Frame for Short term goals: by discharge, pt will  Short term goal 1: demo mod A x1 with ADL transfers with approp AD and min cues for safety  Short term goal 2: demo mod A x 1 bed mob with rails/controls  Short term goal 3: demo good sitting balance to perform simple grooming or self feeding tasks EOB with SBA/set up A  Short term goal 4: demo min A UB ADLs at approp level with min cues for initiation, sequencing, direction following  Patient Goals   Patient goals : not stated       Therapy Time   Individual Concurrent Group Co-treatment   Time In 0950         Time Out 1035         Minutes [address], [place]

## 2019-06-08 VITALS
SYSTOLIC BLOOD PRESSURE: 143 MMHG | TEMPERATURE: 97.8 F | RESPIRATION RATE: 22 BRPM | OXYGEN SATURATION: 98 % | HEART RATE: 68 BPM | HEIGHT: 60 IN | DIASTOLIC BLOOD PRESSURE: 55 MMHG | BODY MASS INDEX: 36.53 KG/M2 | WEIGHT: 186.1 LBS

## 2019-06-08 LAB
ANION GAP SERPL CALCULATED.3IONS-SCNC: 10 MMOL/L (ref 9–17)
BUN BLDV-MCNC: 33 MG/DL (ref 8–23)
BUN/CREAT BLD: 19 (ref 9–20)
CALCIUM IONIZED: 1.3 MMOL/L (ref 1.13–1.33)
CALCIUM SERPL-MCNC: 9.4 MG/DL (ref 8.6–10.4)
CHLORIDE BLD-SCNC: 98 MMOL/L (ref 98–107)
CO2: 31 MMOL/L (ref 20–31)
CREAT SERPL-MCNC: 1.73 MG/DL (ref 0.5–0.9)
GFR AFRICAN AMERICAN: 35 ML/MIN
GFR NON-AFRICAN AMERICAN: 29 ML/MIN
GFR SERPL CREATININE-BSD FRML MDRD: ABNORMAL ML/MIN/{1.73_M2}
GFR SERPL CREATININE-BSD FRML MDRD: ABNORMAL ML/MIN/{1.73_M2}
GLUCOSE BLD-MCNC: 95 MG/DL (ref 65–105)
GLUCOSE BLD-MCNC: 97 MG/DL (ref 70–99)
POTASSIUM SERPL-SCNC: 4.2 MMOL/L (ref 3.7–5.3)
SODIUM BLD-SCNC: 139 MMOL/L (ref 135–144)

## 2019-06-08 PROCEDURE — 36415 COLL VENOUS BLD VENIPUNCTURE: CPT

## 2019-06-08 PROCEDURE — 6370000000 HC RX 637 (ALT 250 FOR IP): Performed by: INTERNAL MEDICINE

## 2019-06-08 PROCEDURE — 82947 ASSAY GLUCOSE BLOOD QUANT: CPT

## 2019-06-08 PROCEDURE — 94761 N-INVAS EAR/PLS OXIMETRY MLT: CPT

## 2019-06-08 PROCEDURE — 6360000002 HC RX W HCPCS: Performed by: NURSE PRACTITIONER

## 2019-06-08 PROCEDURE — 82330 ASSAY OF CALCIUM: CPT

## 2019-06-08 PROCEDURE — 80048 BASIC METABOLIC PNL TOTAL CA: CPT

## 2019-06-08 PROCEDURE — 94640 AIRWAY INHALATION TREATMENT: CPT

## 2019-06-08 PROCEDURE — 51798 US URINE CAPACITY MEASURE: CPT

## 2019-06-08 PROCEDURE — 6370000000 HC RX 637 (ALT 250 FOR IP): Performed by: NURSE PRACTITIONER

## 2019-06-08 PROCEDURE — 51701 INSERT BLADDER CATHETER: CPT

## 2019-06-08 PROCEDURE — 2700000000 HC OXYGEN THERAPY PER DAY

## 2019-06-08 PROCEDURE — 99239 HOSP IP/OBS DSCHRG MGMT >30: CPT | Performed by: INTERNAL MEDICINE

## 2019-06-08 RX ADMIN — HEPARIN SODIUM 5000 UNITS: 5000 INJECTION INTRAVENOUS; SUBCUTANEOUS at 05:48

## 2019-06-08 RX ADMIN — MOMETASONE FUROATE AND FORMOTEROL FUMARATE DIHYDRATE 2 PUFF: 200; 5 AEROSOL RESPIRATORY (INHALATION) at 09:43

## 2019-06-08 RX ADMIN — ACETAMINOPHEN 650 MG: 325 TABLET ORAL at 11:50

## 2019-06-08 RX ADMIN — ATORVASTATIN CALCIUM 20 MG: 20 TABLET, FILM COATED ORAL at 09:25

## 2019-06-08 RX ADMIN — ROPINIROLE HYDROCHLORIDE 2 MG: 1 TABLET, FILM COATED ORAL at 09:25

## 2019-06-08 RX ADMIN — AMIODARONE HYDROCHLORIDE 200 MG: 200 TABLET ORAL at 09:24

## 2019-06-08 RX ADMIN — ASPIRIN 81 MG: 81 TABLET, COATED ORAL at 09:24

## 2019-06-08 RX ADMIN — ROPINIROLE HYDROCHLORIDE 2 MG: 1 TABLET, FILM COATED ORAL at 13:30

## 2019-06-08 RX ADMIN — DOCUSATE SODIUM 100 MG: 100 CAPSULE, LIQUID FILLED ORAL at 09:24

## 2019-06-08 RX ADMIN — IPRATROPIUM BROMIDE AND ALBUTEROL SULFATE 3 ML: .5; 3 SOLUTION RESPIRATORY (INHALATION) at 09:41

## 2019-06-08 RX ADMIN — FERROUS SULFATE TAB EC 325 MG (65 MG FE EQUIVALENT) 325 MG: 325 (65 FE) TABLET DELAYED RESPONSE at 09:25

## 2019-06-08 RX ADMIN — RASAGILINE 1 MG: 0.5 TABLET ORAL at 09:25

## 2019-06-08 RX ADMIN — MAGNESIUM OXIDE TAB 400 MG (241.3 MG ELEMENTAL MG) 400 MG: 400 (241.3 MG) TAB at 09:25

## 2019-06-08 RX ADMIN — PRAMIPEXOLE DIHYDROCHLORIDE 0.5 MG: 0.25 TABLET ORAL at 09:25

## 2019-06-08 RX ADMIN — LINAGLIPTIN 2.5 MG: 5 TABLET, FILM COATED ORAL at 09:24

## 2019-06-08 RX ADMIN — PANTOPRAZOLE SODIUM 40 MG: 40 TABLET, DELAYED RELEASE ORAL at 05:48

## 2019-06-08 RX ADMIN — CARBIDOPA AND LEVODOPA 1 TABLET: 25; 100 TABLET, EXTENDED RELEASE ORAL at 09:25

## 2019-06-08 RX ADMIN — ACETAMINOPHEN 650 MG: 325 TABLET ORAL at 01:56

## 2019-06-08 RX ADMIN — CARBIDOPA AND LEVODOPA 1 TABLET: 25; 100 TABLET, EXTENDED RELEASE ORAL at 11:49

## 2019-06-08 RX ADMIN — PRAMIPEXOLE DIHYDROCHLORIDE 0.5 MG: 0.25 TABLET ORAL at 13:31

## 2019-06-08 RX ADMIN — GUAIFENESIN 600 MG: 600 TABLET, EXTENDED RELEASE ORAL at 09:24

## 2019-06-08 ASSESSMENT — PAIN DESCRIPTION - ORIENTATION
ORIENTATION: RIGHT
ORIENTATION: RIGHT

## 2019-06-08 ASSESSMENT — PAIN SCALES - GENERAL
PAINLEVEL_OUTOF10: 1
PAINLEVEL_OUTOF10: 3
PAINLEVEL_OUTOF10: 3
PAINLEVEL_OUTOF10: 2

## 2019-06-08 ASSESSMENT — PAIN DESCRIPTION - PROGRESSION
CLINICAL_PROGRESSION: GRADUALLY IMPROVING
CLINICAL_PROGRESSION: GRADUALLY IMPROVING

## 2019-06-08 ASSESSMENT — PAIN DESCRIPTION - LOCATION
LOCATION: SHOULDER
LOCATION: SHOULDER

## 2019-06-08 ASSESSMENT — PAIN DESCRIPTION - PAIN TYPE
TYPE: SURGICAL PAIN
TYPE: SURGICAL PAIN

## 2019-06-08 ASSESSMENT — PAIN DESCRIPTION - DESCRIPTORS
DESCRIPTORS: ACHING
DESCRIPTORS: SORE

## 2019-06-08 ASSESSMENT — PAIN DESCRIPTION - ONSET
ONSET: ON-GOING
ONSET: ON-GOING

## 2019-06-08 ASSESSMENT — PAIN - FUNCTIONAL ASSESSMENT
PAIN_FUNCTIONAL_ASSESSMENT: PREVENTS OR INTERFERES SOME ACTIVE ACTIVITIES AND ADLS
PAIN_FUNCTIONAL_ASSESSMENT: PREVENTS OR INTERFERES SOME ACTIVE ACTIVITIES AND ADLS

## 2019-06-08 ASSESSMENT — PAIN DESCRIPTION - FREQUENCY
FREQUENCY: INTERMITTENT
FREQUENCY: INTERMITTENT

## 2019-06-08 NOTE — PLAN OF CARE
Problem: Falls - Risk of:  Goal: Will remain free from falls  Description  Will remain free from falls  6/8/2019 0405 by Brian Daley RN  Outcome: Ongoing  Note:   Siderails up x 2  Hourly rounding  Call light in reach  Instructed to call for assist before attempting out of bed. Remains free from falls and accidental injury at this time   Floor free from obstacles  Bed is locked and in lowest position  Adequate lighting provided  Bed alarm on, Red Falling star and Stay with Me signs posted  6/7/2019 2047 by Nico Chicas RN  Outcome: Met This Shift  Note:   Patient is a fall risk during this admission. Fall risk assessment was performed. Patient is absent of falls. Bed is in the lowest position. Wheels on the bed are locked. Call light and bed side table are within reach. Clutter is removed. Patient was educated to call out when needing assistance or wanting to get out of bed. Patient offered toileting assistance during rounding. Hourly rounds have been performed. Goal: Absence of physical injury  Description  Absence of physical injury  Outcome: Ongoing     Problem: Risk for Impaired Skin Integrity  Goal: Tissue integrity - skin and mucous membranes  Description  Structural intactness and normal physiological function of skin and  mucous membranes. 6/8/2019 0405 by Brian Daley RN  Outcome: Ongoing  Note:   Checked for incontinence every 2 hours and prn. Pericare as needed. Assisted to reposition off back frequently. On waffle mattress. Heels off bed with pillows. 6/7/2019 2047 by Nico Chicas RN  Outcome: Met This Shift  Note:   Wound ostomy nurse in this am and changed and evaluated pt wounds - see documentation by them. Duoderm remains in place to coccyx. Heels kept elevated. Air mattress on bed. Problem: Pain:  Goal: Pain level will decrease  Description  Pain level will decrease  Outcome: Ongoing  Note:   Pain level assessment complete.    Patient educated on pain scale and control interventions  PRN pain medication given per patient request  Patient instructed to call out with new onset of pain or unrelieved pain  Goal: Control of acute pain  Description  Control of acute pain  Outcome: Ongoing  Goal: Control of chronic pain  Description  Control of chronic pain  6/8/2019 0405 by Yung Burgos RN  Outcome: Ongoing  6/7/2019 2047 by Margy Rodriguez RN  Outcome: Met This Shift  Note:   Pt voices her pain is controlled with use of tylenol. Pt states she takes it approx every 6 hours. Pt has taken this shift with voiced control of pain.      Problem: IP BALANCE  Goal: LTG - Patient will maintain balance to allow for safe/functional mobility  Outcome: Ongoing     Problem: Urinary Elimination:  Goal: Ability to reestablish a normal urinary elimination pattern will improve - after catheter removal  Description  Ability to reestablish a normal urinary elimination pattern will improve  Outcome: Ongoing  Goal: Complications related to the disease process, condition or treatment will be avoided or minimized  Description  Complications related to the disease process, condition or treatment will be avoided or minimized  Outcome: Ongoing     Problem: Serum Glucose Level - Abnormal:  Goal: Ability to maintain appropriate glucose levels will improve  Description  Ability to maintain appropriate glucose levels will improve  Outcome: Ongoing

## 2019-06-08 NOTE — DISCHARGE SUMMARY
[facility]    Discharge Summary     Patient ID: Iris Li  :  1945   MRN: [id number]     ACCOUNT:  [de-identified]   Patient's PCP: Kate Hassan DO  Admit Date: 2019   Discharge Date: 2019  Length of Stay: 4  Code Status:  Full Code  Admitting Physician: John Bill MD  Discharge Physician: John Bill MD     Active Discharge Diagnoses:     Hospital Problem Lists:  Principal Problem:    Hypercalcemia  Active Problems:    Essential hypertension    Paroxysmal atrial fibrillation (HCC)    ECTOR on CPAP    Type 2 diabetes mellitus with complication, without long-term current use of insulin (HCC)    Acute kidney injury superimposed on chronic kidney disease (HCC)    Chronic diastolic congestive heart failure (HCC)    History of ESBL E. coli infection    Urinary tract infection without hematuria    Metabolic encephalopathy  Resolved Problems:    * No resolved hospital problems. *      Admission Condition:  fair     Discharged Condition: stable    Hospital Stay:     Hospital Course: The patient is a 76 y. o.female with PMH signifcant for parkinson's disease, diabetes, hypertension, atrial fibrillation, ECTOR with CPAP use, CKD and CHF who presents to the hospital with mental status changes. She recently underwent a right humerus fracture repair post mechanical fall on 19, she was sent to [facility] for rehab after discharge. ER notes indicate the patient's family noted her to be more lethargic than normal.     In ER labs demonstrated calcium elevated at 13.4, ionized calcium 1.76, the patient takes calcium citrate and calcitrol. Supplements held. AMS resolved with improvement in calcium, supplements held on d/c. UA positive for bacteria, treated with IV abx, final urine culture negative. DC to SNF.        Significant therapeutic interventions:   - Labs, imaging, previous urine cultures reviewed  - AMS due to calcium supplementation  - Check PTH - appropriately suppressed  - Monitor calcium daily  - Discontinue meropenem given hx of ESBL, follow up urine cultures - no growth  - RT aerosol / NIV protocol   - Supplemental home O2  - Aranesp per regular schedule  - Wound care  - Continue selected home medications  - PT/OT   - SW/CM for dc planning  - DC today to SNF        Significant Diagnostic Studies:   Labs / Micro:  CBC:   Lab Results   Component Value Date    WBC 9.7 06/05/2019    RBC 2.60 06/05/2019    HGB 8.0 06/05/2019    HCT 26.8 06/05/2019    .1 06/05/2019    MCH 30.8 06/05/2019    MCHC 29.9 06/05/2019    RDW 15.1 06/05/2019     06/05/2019     BMP:    Lab Results   Component Value Date    GLUCOSE 97 06/08/2019    GLUCOSE 132 03/07/2012     06/08/2019    K 4.2 06/08/2019    CL 98 06/08/2019    CO2 31 06/08/2019    ANIONGAP 10 06/08/2019    BUN 33 06/08/2019    CREATININE 1.73 06/08/2019    BUNCRER 19 06/08/2019    CALCIUM 9.4 06/08/2019    LABGLOM 29 06/08/2019    GFRAA 35 06/08/2019    GFR      06/08/2019    GFR NOT REPORTED 06/08/2019     HFP:    Lab Results   Component Value Date    PROT 6.3 06/05/2019     CMP:    Lab Results   Component Value Date    GLUCOSE 97 06/08/2019    GLUCOSE 132 03/07/2012     06/08/2019    K 4.2 06/08/2019    CL 98 06/08/2019    CO2 31 06/08/2019    BUN 33 06/08/2019    CREATININE 1.73 06/08/2019    ANIONGAP 10 06/08/2019    ALKPHOS 67 06/05/2019    ALT <5 06/05/2019    AST 17 06/05/2019    BILITOT 0.43 06/05/2019    LABALBU 3.2 06/05/2019    LABALBU Detected 11/30/2018    ALBUMIN NOT REPORTED 06/05/2019    LABGLOM 29 06/08/2019    GFRAA 35 06/08/2019    GFR      06/08/2019    GFR NOT REPORTED 06/08/2019    PROT 6.3 06/05/2019    CALCIUM 9.4 06/08/2019     PT/INR:  No results found for: PTINR  PTT:   Lab Results   Component Value Date    APTT 25.4 05/26/2019     FLP:    Lab Results   Component Value Date    CHOL 135 08/26/2017    TRIG 158 08/26/2017    HDL 30 08/26/2017     U/A:    Lab Caps capsule  Commonly known as:  CHOLECALCIFEROL         * This list has 2 medication(s) that are the same as other medications prescribed for you. Read the directions carefully, and ask your doctor or other care provider to review them with you. STOP taking these medications    ALPRAZolam 0.25 MG tablet  Commonly known as:  XANAX     calcitRIOL 0.25 MCG capsule  Commonly known as:  ROCALTROL     CALCIUM CITRATE PO     oxyCODONE-acetaminophen 5-325 MG per tablet  Commonly known as:  PERCOCET            Time Spent on discharge is  37 mins in patient examination, evaluation, counseling as well as medication reconciliation, prescriptions for required medications, discharge plan and follow up. Electronically signed by   Savannah Pathak MD  6/8/2019  3:32 PM      Thank you Dr. Sherri Chou DO for the opportunity to be involved in this patient's care.

## 2019-06-08 NOTE — PROGRESS NOTES
[facility]    Progress Note    6/8/2019    10:56 AM    Name:   Deborah Dunbar  MRN:     [id number]     Acct:      [de-identified]   Room:   2001/2001-02  IP Day:  4  Admit Date:  6/4/2019  5:35 PM    PCP:   Diann Grady DO  Code Status:  Full Code    Subjective:     C/C:   Chief Complaint   Patient presents with    Abnormal Lab    Arm Pain     right     Interval History Status: not changed. No acute issues overnight   Urine culture negative  AMS due to calcium   DC planning in progress  No complaints  Questions answered      Brief History:     The patient is a 72 y.o.  female who presents to the hospital with complaint of right arm pain and abnormal lab value. The patient was brought to the hospital by EMS for evaluation of mental status changes. She recently underwent a right humerus fracture repair post mechanical fall on 5/28/19, she was sent to [facility] for rehab after discharge. ER notes indicate the patient's family noted her to be more lethargic than normal. The patient endorses right shoulder pain that she describes as aching, constant and 8/10 in intensity. No definitive aggravating or alleviating factors. She is alert and interactive, but has difficulty answering questions. She denies fever, chills or nausea. She has a history of parkinson's disease, diabetes, hypertension, atrial fibrillation, ECTOR with CPAP use, CKD and CHF. She will be admitted for further evaluation and treatment.      Her calcium is notably elevated at 13.4, ionized calcium 1.76, the patient takes calcium citrate and calcitrol. Record review indicates she had suspected hypoparathyroidism.      UA indicated small leukocytes, moderate bacteria.      She sees Dr. Carly Damon outpatient with cardiology.        Review of Systems:     Constitutional:  negative for chills, fevers, sweats  Respiratory:  Positive for cough  Cardiovascular:  negative for chest pain, chest ANIONGAP 11 11 10   LABGLOM 28* 28* 29*   GFRAA 35* 34* 35*   CALCIUM 10.2 9.5 9.4   CAION 1.40* 1.33 1.30     Recent Labs     06/06/19  2119 06/07/19  0620 06/07/19  1130 06/07/19  1609 06/07/19 2051 06/08/19  0631   POCGLU 113* 90 110* 89 122* 95         Lab Results   Component Value Date/Time    SPECIAL L ARM 8ML 06/04/2019 06:32 PM    SPECIAL L ARM 10ML 06/04/2019 06:32 PM     Lab Results   Component Value Date/Time    CULTURE NO GROWTH 4 DAYS 06/04/2019 06:32 PM    CULTURE NO GROWTH 4 DAYS 06/04/2019 06:32 PM       Lab Results   Component Value Date    POCPH 7.50 06/04/2019    POCPCO2 50 06/04/2019    POCPO2 151 06/04/2019    POCHCO3 38.8 06/04/2019    NBEA NOT REPORTED 06/04/2019    PBEA 16 06/04/2019    NQW4JWF 40 06/04/2019    FMSU2TPQ 99 06/04/2019    FIO2 36.0 06/04/2019       Radiology:    Ct Head Wo Contrast    Result Date: 6/4/2019  No acute intracranial abnormality. No change from prior exams. Right maxillary sinusitis. Fluid in bilateral mastoids. Xr Chest Portable    Result Date: 6/4/2019  No acute findings in the chest.     Xr Chest Portable    Result Date: 5/30/2019  Hypoventilatory somewhat rotated AP portable chest x-ray without obvious acute cardiopulmonary disease; some worsening of retrocardiac density a consideration. RECOMMENDATION: Follow-up chest x-ray, preferably two-view examination in the department if possible.        Physical Examination:        General appearance:  alert, cooperative and no distress  Mental Status:  oriented to person, place and time and normal affect  Lungs:  clear to auscultation bilaterally, normal effort  Heart:  regular rate and rhythm  Abdomen:  soft, nontender, nondistended, normal bowel sounds  Extremities:  no edema, redness, tenderness in the calves  Skin:  no gross lesions, rashes, induration    Assessment:        Primary Problem  Hypercalcemia    Active Hospital Problems    Diagnosis Date Noted    Metabolic encephalopathy [C90.67] 06/05/2019   

## 2019-06-08 NOTE — PLAN OF CARE
Problem: Falls - Risk of:  Goal: Will remain free from falls  Description  Will remain free from falls  Outcome: Met This Shift  Note:   Patient is a fall risk during this admission. Fall risk assessment was performed. Patient is absent of falls. Bed is in the lowest position. Wheels on the bed are locked. Call light and bed side table are within reach. Clutter is removed. Patient was educated to call out when needing assistance or wanting to get out of bed. Patient offered toileting assistance during rounding. Hourly rounds have been performed. Problem: Risk for Impaired Skin Integrity  Goal: Tissue integrity - skin and mucous membranes  Description  Structural intactness and normal physiological function of skin and  mucous membranes. Outcome: Met This Shift  Note:   Wound ostomy nurse in this am and changed and evaluated pt wounds - see documentation by them. Duoderm remains in place to coccyx. Heels kept elevated. Air mattress on bed. Problem: Pain:  Goal: Control of chronic pain  Description  Control of chronic pain  Outcome: Met This Shift  Note:   Pt voices her pain is controlled with use of tylenol. Pt states she takes it approx every 6 hours. Pt has taken this shift with voiced control of pain. Problem: Urinary Elimination:  Goal: Signs and symptoms of infection will decrease  Description  Signs and symptoms of infection will decrease  Outcome: Completed  Note:   Pt off antibiotics. Dr said urine is coming back clean. Has inability to void on own. Dribbles with coughing. Continues to need straight cathed for retention.

## 2019-06-10 ENCOUNTER — TELEPHONE (OUTPATIENT)
Dept: ONCOLOGY | Age: 74
End: 2019-06-10

## 2019-06-10 LAB
CULTURE: NORMAL
CULTURE: NORMAL
GLUCOSE BLD-MCNC: 105 MG/DL (ref 65–105)
Lab: NORMAL
Lab: NORMAL
SPECIMEN DESCRIPTION: NORMAL
SPECIMEN DESCRIPTION: NORMAL

## 2019-06-10 NOTE — TELEPHONE ENCOUNTER
Lorena Aleman ZACKARY , calling to clarify Vitamin B12 appt , pt is scheduled at [facility] at 1330 on 6/18/19 for Vitamin B12 and aranesp. Phil Cardenas states understanding.

## 2019-06-11 ENCOUNTER — OFFICE VISIT (OUTPATIENT)
Dept: ORTHOPEDIC SURGERY | Age: 74
End: 2019-06-11

## 2019-06-11 VITALS — HEIGHT: 60 IN | BODY MASS INDEX: 36.53 KG/M2 | WEIGHT: 186.07 LBS

## 2019-06-11 DIAGNOSIS — S42.91XD CLOSED FRACTURE OF RIGHT SHOULDER WITH ROUTINE HEALING, SUBSEQUENT ENCOUNTER: Primary | ICD-10-CM

## 2019-06-11 PROCEDURE — 99024 POSTOP FOLLOW-UP VISIT: CPT | Performed by: ORTHOPAEDIC SURGERY

## 2019-06-11 NOTE — PROGRESS NOTES
This patient had undergone closed reduction and percutaneous pin fixation of fracture l right shoulder on May 28. The patient is complaining of pain in the shoulder. The pin sites are clean and dry. Advised that she continue with the sling Tylenol for pain. She has so many comorbidities including kidney dysfunction that stronger medication would not be appropriate. Given [facility] Card request we will get x-rays of her shoulder done and made the x-rays to us. Apparently for her to come to the office it caused him $100 a visit.

## 2019-06-17 ENCOUNTER — HOSPITAL ENCOUNTER (OUTPATIENT)
Dept: INFUSION THERAPY | Facility: MEDICAL CENTER | Age: 74
Discharge: HOME | End: 2019-06-17
Payer: COMMERCIAL

## 2019-06-17 ENCOUNTER — TELEPHONE (OUTPATIENT)
Dept: ONCOLOGY | Age: 74
End: 2019-06-17

## 2019-06-17 ENCOUNTER — HOSPITAL ENCOUNTER (OUTPATIENT)
Facility: MEDICAL CENTER | Age: 74
Discharge: HOME | End: 2019-06-17
Payer: COMMERCIAL

## 2019-06-17 VITALS
HEART RATE: 71 BPM | DIASTOLIC BLOOD PRESSURE: 54 MMHG | SYSTOLIC BLOOD PRESSURE: 112 MMHG | RESPIRATION RATE: 18 BRPM | TEMPERATURE: 99.1 F

## 2019-06-17 DIAGNOSIS — N18.4 ANEMIA OF CHRONIC RENAL FAILURE, STAGE 4 (SEVERE) (HCC): ICD-10-CM

## 2019-06-17 DIAGNOSIS — K90.89 OTHER SPECIFIED INTESTINAL MALABSORPTION: Primary | ICD-10-CM

## 2019-06-17 DIAGNOSIS — N18.4 CKD STAGE 4 DUE TO TYPE 2 DIABETES MELLITUS (HCC): Chronic | ICD-10-CM

## 2019-06-17 DIAGNOSIS — E61.1 IRON DEFICIENCY: ICD-10-CM

## 2019-06-17 DIAGNOSIS — E11.22 CKD STAGE 4 DUE TO TYPE 2 DIABETES MELLITUS (HCC): Chronic | ICD-10-CM

## 2019-06-17 DIAGNOSIS — N20.0 NEPHROLITHIASIS: Chronic | ICD-10-CM

## 2019-06-17 DIAGNOSIS — D63.1 ANEMIA OF CHRONIC RENAL FAILURE, STAGE 4 (SEVERE) (HCC): ICD-10-CM

## 2019-06-17 LAB
ABSOLUTE EOS #: 0.32 K/UL (ref 0–0.44)
ABSOLUTE IMMATURE GRANULOCYTE: 0.04 K/UL (ref 0–0.3)
ABSOLUTE LYMPH #: 1.28 K/UL (ref 1.1–3.7)
ABSOLUTE MONO #: 0.57 K/UL (ref 0.1–1.2)
BASOPHILS # BLD: 0 % (ref 0–2)
BASOPHILS ABSOLUTE: <0.03 K/UL (ref 0–0.2)
DIFFERENTIAL TYPE: ABNORMAL
EOSINOPHILS RELATIVE PERCENT: 5 % (ref 1–4)
HCT VFR BLD CALC: 29.6 % (ref 36.3–47.1)
HEMOGLOBIN: 8.7 G/DL (ref 11.9–15.1)
IMMATURE GRANULOCYTES: 1 %
LYMPHOCYTES # BLD: 20 % (ref 24–43)
MCH RBC QN AUTO: 30.5 PG (ref 25.2–33.5)
MCHC RBC AUTO-ENTMCNC: 29.4 G/DL (ref 28.4–34.8)
MCV RBC AUTO: 103.9 FL (ref 82.6–102.9)
MONOCYTES # BLD: 9 % (ref 3–12)
NRBC AUTOMATED: 0 PER 100 WBC
PDW BLD-RTO: 16.2 % (ref 11.8–14.4)
PLATELET # BLD: 172 K/UL (ref 138–453)
PLATELET ESTIMATE: ABNORMAL
PMV BLD AUTO: 9.7 FL (ref 8.1–13.5)
RBC # BLD: 2.85 M/UL (ref 3.95–5.11)
RBC # BLD: ABNORMAL 10*6/UL
SEG NEUTROPHILS: 65 % (ref 36–65)
SEGMENTED NEUTROPHILS ABSOLUTE COUNT: 4.06 K/UL (ref 1.5–8.1)
WBC # BLD: 6.3 K/UL (ref 3.5–11.3)
WBC # BLD: ABNORMAL 10*3/UL

## 2019-06-17 PROCEDURE — 6360000002 HC RX W HCPCS: Performed by: INTERNAL MEDICINE

## 2019-06-17 PROCEDURE — 85025 COMPLETE CBC W/AUTO DIFF WBC: CPT

## 2019-06-17 PROCEDURE — 96372 THER/PROPH/DIAG INJ SC/IM: CPT

## 2019-06-17 PROCEDURE — 36415 COLL VENOUS BLD VENIPUNCTURE: CPT

## 2019-06-17 RX ORDER — ACETAMINOPHEN 325 MG/1
325 TABLET ORAL
Status: CANCELLED | OUTPATIENT
Start: 2019-06-18

## 2019-06-17 RX ORDER — CYANOCOBALAMIN 1000 UG/ML
1000 INJECTION INTRAMUSCULAR; SUBCUTANEOUS ONCE
Status: CANCELLED
Start: 2019-06-18

## 2019-06-17 RX ORDER — CYANOCOBALAMIN 1000 UG/ML
1000 INJECTION INTRAMUSCULAR; SUBCUTANEOUS ONCE
Status: COMPLETED
Start: 2019-06-17 | End: 2019-06-17

## 2019-06-17 RX ADMIN — DARBEPOETIN ALFA 200 MCG: 200 INJECTION, SOLUTION INTRAVENOUS; SUBCUTANEOUS at 14:18

## 2019-06-17 RX ADMIN — CYANOCOBALAMIN 1000 MCG: 1000 INJECTION, SOLUTION INTRAMUSCULAR at 14:18

## 2019-06-17 ASSESSMENT — PAIN SCALES - GENERAL: PAINLEVEL_OUTOF10: 7

## 2019-06-17 ASSESSMENT — PAIN DESCRIPTION - DESCRIPTORS: DESCRIPTORS: ACHING

## 2019-06-17 ASSESSMENT — PAIN DESCRIPTION - ORIENTATION: ORIENTATION: RIGHT

## 2019-06-17 ASSESSMENT — PAIN DESCRIPTION - PAIN TYPE: TYPE: ACUTE PAIN

## 2019-06-17 ASSESSMENT — PAIN DESCRIPTION - LOCATION: LOCATION: ARM

## 2019-06-17 NOTE — PROGRESS NOTES
Patient arrive via wheelchair with family members from [facility] for Aranesp and B12 injections. Patient family members request discuss with Dr. Jaylan Lozada if she can have prescription for Glucerna as she has poor appetite after breaking arm and is back in [place]. Informed that we will route request to physician of their request.  Order and labs reviewed; hgb 8.7. Patient tolerate injections well; band-aids applied to injection sites. Patient off unit via wheelchair with family members.

## 2019-06-18 ENCOUNTER — TELEPHONE (OUTPATIENT)
Dept: ORTHOPEDIC SURGERY | Age: 74
End: 2019-06-18

## 2019-06-18 ENCOUNTER — HOSPITAL ENCOUNTER (OUTPATIENT)
Dept: INFUSION THERAPY | Facility: MEDICAL CENTER | Age: 74
End: 2019-06-18

## 2019-06-18 DIAGNOSIS — S42.91XD CLOSED FRACTURE OF RIGHT SHOULDER WITH ROUTINE HEALING, SUBSEQUENT ENCOUNTER: Primary | ICD-10-CM

## 2019-06-19 ENCOUNTER — TELEPHONE (OUTPATIENT)
Dept: ORTHOPEDIC SURGERY | Age: 74
End: 2019-06-19

## 2019-06-19 NOTE — TELEPHONE ENCOUNTER
We gave them specific orders yesterday. She can do the pendulum exercises out of this sling. They can do passive and active assisted range of motion exercises out of the sling and other times put the sling back on. She cannot do any active range of motion or strengthening exercises.

## 2019-06-19 NOTE — TELEPHONE ENCOUNTER
Therapy from SNF calling about the therapy orders. They would like specific orders on sling, limits and what she can do and can not with the right shoulder.

## 2019-06-21 ENCOUNTER — HOSPITAL ENCOUNTER (OUTPATIENT)
Facility: MEDICAL CENTER | Age: 74
End: 2019-06-21
Payer: COMMERCIAL

## 2019-06-25 ENCOUNTER — TELEPHONE (OUTPATIENT)
Dept: ONCOLOGY | Age: 74
End: 2019-06-25

## 2019-06-25 NOTE — TELEPHONE ENCOUNTER
UNKNOWN CALLER LEFT A VM AT 10:33AM STATING SHE WAS HAVING TROUBLE GETTING PATIENT IN AND OUT OF THE CAR. I ATTEMPTED TO CALL THE UNIDENTIFIED CALLER BACK ([phone]), BUT THERE WAS NO ANSWER AND I LEFT A VM.

## 2019-07-01 ENCOUNTER — TELEPHONE (OUTPATIENT)
Dept: ORTHOPEDIC SURGERY | Age: 74
End: 2019-07-01

## 2019-07-01 ENCOUNTER — HOSPITAL ENCOUNTER (OUTPATIENT)
Facility: MEDICAL CENTER | Age: 74
Discharge: HOME | End: 2019-07-01
Payer: COMMERCIAL

## 2019-07-01 ENCOUNTER — HOSPITAL ENCOUNTER (OUTPATIENT)
Dept: INFUSION THERAPY | Facility: MEDICAL CENTER | Age: 74
Discharge: HOME | End: 2019-07-01
Payer: COMMERCIAL

## 2019-07-01 VITALS
HEART RATE: 72 BPM | SYSTOLIC BLOOD PRESSURE: 94 MMHG | TEMPERATURE: 98.3 F | RESPIRATION RATE: 18 BRPM | DIASTOLIC BLOOD PRESSURE: 54 MMHG

## 2019-07-01 DIAGNOSIS — D63.1 ANEMIA OF CHRONIC RENAL FAILURE, STAGE 4 (SEVERE) (HCC): ICD-10-CM

## 2019-07-01 DIAGNOSIS — N18.4 ANEMIA OF CHRONIC RENAL FAILURE, STAGE 4 (SEVERE) (HCC): ICD-10-CM

## 2019-07-01 DIAGNOSIS — E11.22 CKD STAGE 4 DUE TO TYPE 2 DIABETES MELLITUS (HCC): Chronic | ICD-10-CM

## 2019-07-01 DIAGNOSIS — N18.4 CKD STAGE 4 DUE TO TYPE 2 DIABETES MELLITUS (HCC): Chronic | ICD-10-CM

## 2019-07-01 DIAGNOSIS — N20.0 NEPHROLITHIASIS: Chronic | ICD-10-CM

## 2019-07-01 DIAGNOSIS — K90.89 OTHER SPECIFIED INTESTINAL MALABSORPTION: Primary | ICD-10-CM

## 2019-07-01 DIAGNOSIS — E61.1 IRON DEFICIENCY: ICD-10-CM

## 2019-07-01 LAB
ABSOLUTE EOS #: 0.33 K/UL (ref 0–0.44)
ABSOLUTE IMMATURE GRANULOCYTE: 0.04 K/UL (ref 0–0.3)
ABSOLUTE LYMPH #: 1.14 K/UL (ref 1.1–3.7)
ABSOLUTE MONO #: 0.49 K/UL (ref 0.1–1.2)
BASOPHILS # BLD: 0 % (ref 0–2)
BASOPHILS ABSOLUTE: 0.03 K/UL (ref 0–0.2)
DIFFERENTIAL TYPE: ABNORMAL
EOSINOPHILS RELATIVE PERCENT: 5 % (ref 1–4)
HCT VFR BLD CALC: 32.9 % (ref 36.3–47.1)
HEMOGLOBIN: 9.7 G/DL (ref 11.9–15.1)
IMMATURE GRANULOCYTES: 1 %
LYMPHOCYTES # BLD: 17 % (ref 24–43)
MCH RBC QN AUTO: 31.3 PG (ref 25.2–33.5)
MCHC RBC AUTO-ENTMCNC: 29.5 G/DL (ref 28.4–34.8)
MCV RBC AUTO: 106.1 FL (ref 82.6–102.9)
MONOCYTES # BLD: 7 % (ref 3–12)
NRBC AUTOMATED: 0 PER 100 WBC
PDW BLD-RTO: 16.8 % (ref 11.8–14.4)
PLATELET # BLD: 103 K/UL (ref 138–453)
PLATELET ESTIMATE: ABNORMAL
PMV BLD AUTO: 11 FL (ref 8.1–13.5)
RBC # BLD: 3.1 M/UL (ref 3.95–5.11)
RBC # BLD: ABNORMAL 10*6/UL
SEG NEUTROPHILS: 70 % (ref 36–65)
SEGMENTED NEUTROPHILS ABSOLUTE COUNT: 4.66 K/UL (ref 1.5–8.1)
WBC # BLD: 6.7 K/UL (ref 3.5–11.3)
WBC # BLD: ABNORMAL 10*3/UL

## 2019-07-01 PROCEDURE — 6360000002 HC RX W HCPCS: Performed by: INTERNAL MEDICINE

## 2019-07-01 PROCEDURE — 85025 COMPLETE CBC W/AUTO DIFF WBC: CPT

## 2019-07-01 PROCEDURE — 96372 THER/PROPH/DIAG INJ SC/IM: CPT

## 2019-07-01 PROCEDURE — 36415 COLL VENOUS BLD VENIPUNCTURE: CPT

## 2019-07-01 RX ORDER — CYANOCOBALAMIN 1000 UG/ML
1000 INJECTION INTRAMUSCULAR; SUBCUTANEOUS ONCE
Status: CANCELLED
Start: 2019-07-16

## 2019-07-01 RX ORDER — ACETAMINOPHEN 325 MG/1
325 TABLET ORAL
Status: CANCELLED | OUTPATIENT
Start: 2019-07-16

## 2019-07-01 RX ADMIN — DARBEPOETIN ALFA 200 MCG: 200 INJECTION, SOLUTION INTRAVENOUS; SUBCUTANEOUS at 14:14

## 2019-07-01 NOTE — PROGRESS NOTES
Yisel ePters here per wheel chair. Hemoglobin of 9.7. Informed pt of results, aranesp to upper right arm without difficulty. Pt back in two weeks, MD visit, aranesp, and vitamin B12, injection.

## 2019-07-01 NOTE — TELEPHONE ENCOUNTER
Patient had CRPP right humerus surgical neck on 05/28/19. Swelling to that area has gone down now and nursing staff are noticing something like hardware protruding into the skin with tenting over anterior aspect of the surgical incision. X-rays were taken at [facility] June 27.  Please advise

## 2019-07-03 RX ORDER — ACETAMINOPHEN 325 MG/1
650 TABLET ORAL EVERY 4 HOURS PRN
Status: ON HOLD | COMMUNITY
End: 2019-07-05 | Stop reason: HOSPADM

## 2019-07-03 RX ORDER — CALCITRIOL 0.25 UG/1
0.25 CAPSULE, LIQUID FILLED ORAL 2 TIMES DAILY
COMMUNITY

## 2019-07-03 RX ORDER — PSEUDOEPHEDRINE HCL 30 MG
500 TABLET ORAL 3 TIMES DAILY
Status: ON HOLD | COMMUNITY
End: 2019-09-11 | Stop reason: HOSPADM

## 2019-07-05 ENCOUNTER — HOSPITAL ENCOUNTER (OUTPATIENT)
Age: 74
Setting detail: OUTPATIENT SURGERY
Discharge: HOME | End: 2019-07-05
Attending: ORTHOPAEDIC SURGERY | Admitting: ORTHOPAEDIC SURGERY
Payer: COMMERCIAL

## 2019-07-05 ENCOUNTER — APPOINTMENT (OUTPATIENT)
Dept: GENERAL RADIOLOGY | Age: 74
End: 2019-07-05
Attending: ORTHOPAEDIC SURGERY
Payer: COMMERCIAL

## 2019-07-05 ENCOUNTER — ANESTHESIA EVENT (OUTPATIENT)
Dept: OPERATING ROOM | Age: 74
End: 2019-07-05
Payer: COMMERCIAL

## 2019-07-05 ENCOUNTER — ANESTHESIA (OUTPATIENT)
Dept: OPERATING ROOM | Age: 74
End: 2019-07-05
Payer: COMMERCIAL

## 2019-07-05 VITALS
DIASTOLIC BLOOD PRESSURE: 50 MMHG | WEIGHT: 179 LBS | BODY MASS INDEX: 33.79 KG/M2 | HEIGHT: 61 IN | SYSTOLIC BLOOD PRESSURE: 119 MMHG | RESPIRATION RATE: 14 BRPM | TEMPERATURE: 97.3 F | OXYGEN SATURATION: 100 % | HEART RATE: 76 BPM

## 2019-07-05 VITALS — TEMPERATURE: 96.7 F | DIASTOLIC BLOOD PRESSURE: 65 MMHG | OXYGEN SATURATION: 100 % | SYSTOLIC BLOOD PRESSURE: 131 MMHG

## 2019-07-05 DIAGNOSIS — G89.18 POST-OP PAIN: Primary | ICD-10-CM

## 2019-07-05 LAB
CALCIUM SERPL-MCNC: 7.9 MG/DL (ref 8.6–10.4)
GLUCOSE BLD-MCNC: 116 MG/DL (ref 74–100)
GLUCOSE BLD-MCNC: 120 MG/DL (ref 65–105)
POC POTASSIUM: 4.6 MMOL/L (ref 3.5–4.5)

## 2019-07-05 PROCEDURE — 93971 EXTREMITY STUDY: CPT

## 2019-07-05 PROCEDURE — 2709999900 HC NON-CHARGEABLE SUPPLY: Performed by: ORTHOPAEDIC SURGERY

## 2019-07-05 PROCEDURE — 82310 ASSAY OF CALCIUM: CPT

## 2019-07-05 PROCEDURE — 6360000002 HC RX W HCPCS: Performed by: ANESTHESIOLOGY

## 2019-07-05 PROCEDURE — 82947 ASSAY GLUCOSE BLOOD QUANT: CPT

## 2019-07-05 PROCEDURE — 3700000000 HC ANESTHESIA ATTENDED CARE: Performed by: ORTHOPAEDIC SURGERY

## 2019-07-05 PROCEDURE — 7100000000 HC PACU RECOVERY - FIRST 15 MIN: Performed by: ORTHOPAEDIC SURGERY

## 2019-07-05 PROCEDURE — 6370000000 HC RX 637 (ALT 250 FOR IP): Performed by: NURSE ANESTHETIST, CERTIFIED REGISTERED

## 2019-07-05 PROCEDURE — 3600000004 HC SURGERY LEVEL 4 BASE: Performed by: ORTHOPAEDIC SURGERY

## 2019-07-05 PROCEDURE — 6360000002 HC RX W HCPCS: Performed by: NURSE ANESTHETIST, CERTIFIED REGISTERED

## 2019-07-05 PROCEDURE — 2580000003 HC RX 258: Performed by: ORTHOPAEDIC SURGERY

## 2019-07-05 PROCEDURE — 3700000001 HC ADD 15 MINUTES (ANESTHESIA): Performed by: ORTHOPAEDIC SURGERY

## 2019-07-05 PROCEDURE — 7100000001 HC PACU RECOVERY - ADDTL 15 MIN: Performed by: ORTHOPAEDIC SURGERY

## 2019-07-05 PROCEDURE — 7100000010 HC PHASE II RECOVERY - FIRST 15 MIN: Performed by: ORTHOPAEDIC SURGERY

## 2019-07-05 PROCEDURE — 2500000003 HC RX 250 WO HCPCS: Performed by: NURSE ANESTHETIST, CERTIFIED REGISTERED

## 2019-07-05 PROCEDURE — 84132 ASSAY OF SERUM POTASSIUM: CPT

## 2019-07-05 PROCEDURE — 7100000011 HC PHASE II RECOVERY - ADDTL 15 MIN: Performed by: ORTHOPAEDIC SURGERY

## 2019-07-05 PROCEDURE — 73020 X-RAY EXAM OF SHOULDER: CPT

## 2019-07-05 PROCEDURE — 2580000003 HC RX 258: Performed by: ANESTHESIOLOGY

## 2019-07-05 PROCEDURE — 3600000014 HC SURGERY LEVEL 4 ADDTL 15MIN: Performed by: ORTHOPAEDIC SURGERY

## 2019-07-05 RX ORDER — SODIUM CHLORIDE 9 MG/ML
INJECTION, SOLUTION INTRAVENOUS CONTINUOUS
Status: DISCONTINUED | OUTPATIENT
Start: 2019-07-05 | End: 2019-07-05 | Stop reason: HOSPADM

## 2019-07-05 RX ORDER — LIDOCAINE HYDROCHLORIDE 10 MG/ML
INJECTION, SOLUTION EPIDURAL; INFILTRATION; INTRACAUDAL; PERINEURAL PRN
Status: DISCONTINUED | OUTPATIENT
Start: 2019-07-05 | End: 2019-07-05 | Stop reason: SDUPTHER

## 2019-07-05 RX ORDER — OXYCODONE HYDROCHLORIDE AND ACETAMINOPHEN 5; 325 MG/1; MG/1
1 TABLET ORAL EVERY 6 HOURS PRN
Qty: 20 TABLET | Refills: 0 | Status: SHIPPED | OUTPATIENT
Start: 2019-07-05 | End: 2019-07-10

## 2019-07-05 RX ORDER — PROPOFOL 10 MG/ML
INJECTION, EMULSION INTRAVENOUS PRN
Status: DISCONTINUED | OUTPATIENT
Start: 2019-07-05 | End: 2019-07-05 | Stop reason: SDUPTHER

## 2019-07-05 RX ORDER — ALBUTEROL SULFATE 90 UG/1
AEROSOL, METERED RESPIRATORY (INHALATION) PRN
Status: DISCONTINUED | OUTPATIENT
Start: 2019-07-05 | End: 2019-07-05 | Stop reason: SDUPTHER

## 2019-07-05 RX ORDER — MAGNESIUM HYDROXIDE 1200 MG/15ML
LIQUID ORAL CONTINUOUS PRN
Status: COMPLETED | OUTPATIENT
Start: 2019-07-05 | End: 2019-07-05

## 2019-07-05 RX ORDER — GLYCOPYRROLATE 1 MG/5 ML
SYRINGE (ML) INTRAVENOUS PRN
Status: DISCONTINUED | OUTPATIENT
Start: 2019-07-05 | End: 2019-07-05 | Stop reason: SDUPTHER

## 2019-07-05 RX ORDER — FENTANYL CITRATE 50 UG/ML
12.5 INJECTION, SOLUTION INTRAMUSCULAR; INTRAVENOUS EVERY 5 MIN PRN
Status: COMPLETED | OUTPATIENT
Start: 2019-07-05 | End: 2019-07-05

## 2019-07-05 RX ORDER — CLINDAMYCIN PHOSPHATE 900 MG/50ML
INJECTION INTRAVENOUS PRN
Status: DISCONTINUED | OUTPATIENT
Start: 2019-07-05 | End: 2019-07-05 | Stop reason: SDUPTHER

## 2019-07-05 RX ORDER — PHENYLEPHRINE HYDROCHLORIDE 10 MG/ML
INJECTION INTRAVENOUS PRN
Status: DISCONTINUED | OUTPATIENT
Start: 2019-07-05 | End: 2019-07-05 | Stop reason: SDUPTHER

## 2019-07-05 RX ORDER — CLINDAMYCIN HYDROCHLORIDE 300 MG/1
300 CAPSULE ORAL 3 TIMES DAILY
Qty: 30 CAPSULE | Refills: 0 | Status: SHIPPED | OUTPATIENT
Start: 2019-07-05 | End: 2019-07-12

## 2019-07-05 RX ORDER — ONDANSETRON 2 MG/ML
4 INJECTION INTRAMUSCULAR; INTRAVENOUS
Status: DISCONTINUED | OUTPATIENT
Start: 2019-07-05 | End: 2019-07-05 | Stop reason: HOSPADM

## 2019-07-05 RX ORDER — ONDANSETRON 2 MG/ML
INJECTION INTRAMUSCULAR; INTRAVENOUS PRN
Status: DISCONTINUED | OUTPATIENT
Start: 2019-07-05 | End: 2019-07-05 | Stop reason: SDUPTHER

## 2019-07-05 RX ORDER — LIDOCAINE HYDROCHLORIDE 10 MG/ML
1 INJECTION, SOLUTION EPIDURAL; INFILTRATION; INTRACAUDAL; PERINEURAL
Status: DISCONTINUED | OUTPATIENT
Start: 2019-07-05 | End: 2019-07-05 | Stop reason: HOSPADM

## 2019-07-05 RX ORDER — DEXAMETHASONE SODIUM PHOSPHATE 4 MG/ML
INJECTION, SOLUTION INTRA-ARTICULAR; INTRALESIONAL; INTRAMUSCULAR; INTRAVENOUS; SOFT TISSUE PRN
Status: DISCONTINUED | OUTPATIENT
Start: 2019-07-05 | End: 2019-07-05 | Stop reason: SDUPTHER

## 2019-07-05 RX ORDER — FENTANYL CITRATE 50 UG/ML
25 INJECTION, SOLUTION INTRAMUSCULAR; INTRAVENOUS EVERY 5 MIN PRN
Status: DISCONTINUED | OUTPATIENT
Start: 2019-07-05 | End: 2019-07-05 | Stop reason: HOSPADM

## 2019-07-05 RX ORDER — FENTANYL CITRATE 50 UG/ML
INJECTION, SOLUTION INTRAMUSCULAR; INTRAVENOUS PRN
Status: DISCONTINUED | OUTPATIENT
Start: 2019-07-05 | End: 2019-07-05 | Stop reason: SDUPTHER

## 2019-07-05 RX ORDER — ALBUTEROL SULFATE 2.5 MG/3ML
2.5 SOLUTION RESPIRATORY (INHALATION)
Status: COMPLETED | OUTPATIENT
Start: 2019-07-05 | End: 2019-07-05

## 2019-07-05 RX ADMIN — CLINDAMYCIN IN 5 PERCENT DEXTROSE 900 MG: 18 INJECTION, SOLUTION INTRAVENOUS at 10:18

## 2019-07-05 RX ADMIN — LIDOCAINE HYDROCHLORIDE 50 MG: 10 INJECTION, SOLUTION EPIDURAL; INFILTRATION; INTRACAUDAL; PERINEURAL at 10:01

## 2019-07-05 RX ADMIN — PROPOFOL 180 MG: 10 INJECTION, EMULSION INTRAVENOUS at 10:05

## 2019-07-05 RX ADMIN — ALBUTEROL SULFATE 2.5 MG: 2.5 SOLUTION RESPIRATORY (INHALATION) at 09:31

## 2019-07-05 RX ADMIN — PHENYLEPHRINE HYDROCHLORIDE 100 MCG: 10 INJECTION INTRAVENOUS at 10:28

## 2019-07-05 RX ADMIN — FENTANYL CITRATE 25 MCG: 50 INJECTION INTRAMUSCULAR; INTRAVENOUS at 10:47

## 2019-07-05 RX ADMIN — FENTANYL CITRATE 25 MCG: 50 INJECTION INTRAMUSCULAR; INTRAVENOUS at 10:35

## 2019-07-05 RX ADMIN — Medication 0.2 MG: at 10:27

## 2019-07-05 RX ADMIN — FENTANYL CITRATE 25 MCG: 50 INJECTION INTRAMUSCULAR; INTRAVENOUS at 10:07

## 2019-07-05 RX ADMIN — ALBUTEROL SULFATE 4 PUFF: 90 AEROSOL, METERED RESPIRATORY (INHALATION) at 10:53

## 2019-07-05 RX ADMIN — FENTANYL CITRATE 12.5 MCG: 50 INJECTION INTRAMUSCULAR; INTRAVENOUS at 12:38

## 2019-07-05 RX ADMIN — ONDANSETRON 4 MG: 2 INJECTION, SOLUTION INTRAMUSCULAR; INTRAVENOUS at 10:33

## 2019-07-05 RX ADMIN — SODIUM CHLORIDE: 9 INJECTION, SOLUTION INTRAVENOUS at 09:31

## 2019-07-05 RX ADMIN — PHENYLEPHRINE HYDROCHLORIDE 100 MCG: 10 INJECTION INTRAVENOUS at 10:23

## 2019-07-05 RX ADMIN — DEXAMETHASONE SODIUM PHOSPHATE 10 MG: 4 INJECTION, SOLUTION INTRAMUSCULAR; INTRAVENOUS at 10:09

## 2019-07-05 RX ADMIN — FENTANYL CITRATE 12.5 MCG: 50 INJECTION INTRAMUSCULAR; INTRAVENOUS at 09:44

## 2019-07-05 RX ADMIN — FENTANYL CITRATE 25 MCG: 50 INJECTION INTRAMUSCULAR; INTRAVENOUS at 10:00

## 2019-07-05 ASSESSMENT — PULMONARY FUNCTION TESTS
PIF_VALUE: 3
PIF_VALUE: 2
PIF_VALUE: 17
PIF_VALUE: 2
PIF_VALUE: 4
PIF_VALUE: 2
PIF_VALUE: 4
PIF_VALUE: 2
PIF_VALUE: 5
PIF_VALUE: 3
PIF_VALUE: 4
PIF_VALUE: 2
PIF_VALUE: 2
PIF_VALUE: 1
PIF_VALUE: 3
PIF_VALUE: 2
PIF_VALUE: 3
PIF_VALUE: 5
PIF_VALUE: 2
PIF_VALUE: 1
PIF_VALUE: 3
PIF_VALUE: 4
PIF_VALUE: 4
PIF_VALUE: 2
PIF_VALUE: 1
PIF_VALUE: 2
PIF_VALUE: 0
PIF_VALUE: 3
PIF_VALUE: 2
PIF_VALUE: 1
PIF_VALUE: 2
PIF_VALUE: 1
PIF_VALUE: 2
PIF_VALUE: 0
PIF_VALUE: 6
PIF_VALUE: 3
PIF_VALUE: 2
PIF_VALUE: 1
PIF_VALUE: 1
PIF_VALUE: 2
PIF_VALUE: 2
PIF_VALUE: 3
PIF_VALUE: 2
PIF_VALUE: 5
PIF_VALUE: 4
PIF_VALUE: 2
PIF_VALUE: 3
PIF_VALUE: 3
PIF_VALUE: 19
PIF_VALUE: 1
PIF_VALUE: 4
PIF_VALUE: 3
PIF_VALUE: 17
PIF_VALUE: 6
PIF_VALUE: 15
PIF_VALUE: 5
PIF_VALUE: 9
PIF_VALUE: 4
PIF_VALUE: 3
PIF_VALUE: 1
PIF_VALUE: 1

## 2019-07-05 ASSESSMENT — PAIN SCALES - GENERAL
PAINLEVEL_OUTOF10: 0
PAINLEVEL_OUTOF10: 10
PAINLEVEL_OUTOF10: 10
PAINLEVEL_OUTOF10: 0
PAINLEVEL_OUTOF10: 0

## 2019-07-05 ASSESSMENT — PAIN - FUNCTIONAL ASSESSMENT: PAIN_FUNCTIONAL_ASSESSMENT: 0-10

## 2019-07-05 ASSESSMENT — PAIN DESCRIPTION - PAIN TYPE
TYPE: SURGICAL PAIN
TYPE: SURGICAL PAIN

## 2019-07-05 NOTE — H&P
Alcohol use: Yes     Alcohol/week: 1.2 oz     Types: 2 Shots of liquor per week     Comment: 4 DRINKS A YEAR    Drug use: No     Comment: Pt denies use.  Sexual activity: None     Comment: not for the past year d/t illness, denies  concerns/pain   Lifestyle    Physical activity:     Days per week: None     Minutes per session: None    Stress: None   Relationships    Social connections:     Talks on phone: None     Gets together: None     Attends Voodoo service: None     Active member of club or organization: None     Attends meetings of clubs or organizations: None     Relationship status: None    Intimate partner violence:     Fear of current or ex partner: None     Emotionally abused: None     Physically abused: None     Forced sexual activity: None   Other Topics Concern    None   Social History Narrative    None       Family History:  Family History   Problem Relation Age of Onset    Heart Failure Mother     Hypertension Mother     Heart Disease Mother     High Blood Pressure Mother          REVIEW OF SYSTEMS:  As above. PHYSICAL EXAM:  Blood pressure (!) 151/59, pulse 73, temperature 97.9 °F (36.6 °C), temperature source Temporal, resp. rate 20, height 5' 1\" (1.549 m), weight 179 lb (81.2 kg), last menstrual period 04/03/2004, SpO2 100 %, not currently breastfeeding. Gen: alert and oriented  Chest: symmetric chest excursion, non labored breathing  Heart: RRR   RUE: Incisions well healed to shoulder. Ecchymosis around incisions. Pins palpable under scars. Ulnar/Median/AIN/PIN motor intact. C4-T1 SILT.   Radial pulse 2+ with BCR    A/P: 72 y.o. female with retained hardware right proximal humerus  - OR for HWR right shoulder  - NPO since midnight  - site marked  - abx on hold for OR  - consent in chart    Estephanie Agarwal DO   9:32 AM 7/5/2019

## 2019-07-05 NOTE — ANESTHESIA PRE PROCEDURE
BEFORE MEALS 2/7/19   Lesly Hill, DO   linagliptin (TRADJENTA) 5 MG tablet Take 0.5 tablets by mouth daily 1/16/19   Lesly Hill DO   ONE TOUCH ULTRA TEST strip USE 1 STRIP THREE TIMES A DAY 12/26/18   Lesly Hill DO   albuterol (ACCUNEB) 1.25 MG/3ML nebulizer solution Inhale 1 ampule into the lungs every 6 hours as needed for Wheezing or Shortness of Breath    Historical Provider, MD Tiki Muse LANCETS 44S MISC 1 Units by Does not apply route 2 times daily 10/23/18   Lesly Hill, DO   amiodarone (CORDARONE) 200 MG tablet Take 200 mg by mouth daily     Historical Provider, MD   melatonin 3 MG TABS tablet Take 1 tablet by mouth nightly as needed (sleep)  Patient taking differently: Take 6 mg by mouth nightly  9/7/18   Yahaira Marcos, DO   magnesium oxide (MAG-OX) 400 (241.3 Mg) MG TABS tablet Take 1 tablet by mouth 2 times daily 9/7/18   Yahaira Marcos DO   senna (SENOKOT) 8.6 MG tablet Take 2 tablets by mouth nightly     Historical Provider, MD   aspirin 81 MG tablet Take 81 mg by mouth daily     Historical Provider, MD   ferrous sulfate 325 (65 Fe) MG EC tablet Take 1 tablet by mouth 2 times daily (with meals) 12/21/17   [facility], DO   vitamin D (CHOLECALCIFEROL) 5000 units CAPS capsule Take 5,000 Units by mouth daily    Historical Provider, MD   rasagiline mesylate 1 MG TABS Take 1 tablet by mouth daily    Historical Provider, MD   Oxygen Concentrator Dx: Pneumonia, use as directed. Patient taking differently: Dx: Pneumonia, use as directed. ON 24/7 2/10/17   Raji Hill DO       Current medications:    No current facility-administered medications for this visit. No current outpatient medications on file.      Facility-Administered Medications Ordered in Other Visits   Medication Dose Route Frequency Provider Last Rate Last Dose    0.9 % sodium chloride infusion   Intravenous Continuous Dl Hernandez  mL/hr at 07/05/19 0997      lidocaine PF 1 % injection 1 mL  1 mL Roslyn BECK. Home O2 at 2-3L/NC 24/7.     Difficult intravenous access     VEINS ROLL    Difficulty walking     AMBULATES WITH THERAPPY    Diverticulosis     DM2 (diabetes mellitus, type 2) ([facility] [address] 2008    Full dentures     FULL UPPER ONLY    GERD (gastroesophageal reflux disease) 2008    ON RX    H/O fall     Headache(784.0)     [place] (hard of hearing)     HAS HEARING AIDS BUT DOES NOT WEAR    Hyperlipidemia     Hyperplastic polyp of intestine     Hypertension 2008    Impaired ambulation     USES TRANFER CHAIR WALKER OR CANE    Kidney stone 2018    PRESENTLY-SMALL    Living in nursing home     [address]    Morbid obesity with BMI of 40.0-44.9, adult ([facility] [address] 12/30/2016    Muscle weakness     Open wound     COCCYX    ECTOR on CPAP 2008    USES C-PAP NIGHTLY    Osteoarthritis     OSTEOARTHRITIS    Parkinson disease ([facility] [address] 2015    Restless leg syndrome 2013    MILD    Spinal stenosis in cervical region 6/2/2013    Type II or unspecified type diabetes mellitus without mention of complication, not stated as uncontrolled     Urethral caruncle 3/8/2018    Wears glasses     Wears glasses        Past Surgical History:        Procedure Laterality Date    APPENDECTOMY      BRONCHOSCOPY  06/05/2018    CERVICAL DISC SURGERY  2012    CERVICAL SPINE SURGERY  5/31/13    posterior c5-t1    COLONOSCOPY  2009    COLONOSCOPY  12/29/2016    incomplete was not cleaned out    COLONOSCOPY  12/30/2016    COLONOSCOPY  04/25/2017     SIGMOID COLON POLYPECTOMY:  HYPERPLASTIC POLYP ,   DIVERTICULOSIS    CYSTORRHAPHY  04/04/2018    EYE SURGERY Bilateral 2017    CATARACTS W/ IOL    HERNIA REPAIR  1999    VENTRAL    LAMINECTOMY  05/31/2013    Dr. Cheyanne Calero DX W/CELL WASHG 100 [facility] N/A 6/5/2018    BRONCHOSCOPY performed by Kathleen Purdy MD at [address] N/A 4/25/2017    COLONOSCOPY POLYPECTOMY HOT BIOPSY performed by Rohith Tripp MD at [facility] N/A 2018    CYSTOSCOPY performed by Abraham Dumont MD at [address] Right 2019    SHOULDER OPEN REDUCTION INTERNAL FIXATION PERCUTANEOUS PINNING & CLOSED REDUCTION (Right Shoulder    SHOULDER FRACTURE SURGERY Right 2019    SHOULDER CLOSED REDUCTION PERCUTANEOUS PINNING RIGHT performed by Lurlean Pallas, MD at [place] [address] ENDOSCOPY  2016    gastritis, esophagitis,     UPPER GASTROINTESTINAL ENDOSCOPY  2018    with biopsy    UPPER GASTROINTESTINAL ENDOSCOPY N/A 2018    EGD BIOPSY performed by Rohith Tripp MD at [place] [address] History:    Social History     Tobacco Use    Smoking status: Former Smoker     Packs/day: 2.00     Years: 15.00     Pack years: 30.00     Types: Cigarettes     Last attempt to quit: 10/31/1970     Years since quittin.7    Smokeless tobacco: Never Used   Substance Use Topics    Alcohol use: Yes     Alcohol/week: 1.2 oz     Types: 2 Shots of liquor per week     Comment: 4 DRINKS A YEAR                                Counseling given: Not Answered      Vital Signs (Current): There were no vitals filed for this visit.                                            BP Readings from Last 3 Encounters:   19 (!) 151/59   19 (!) 94/54   19 (!) 112/54       NPO Status:                                                                                 BMI:   Wt Readings from Last 3 Encounters:   19 179 lb (81.2 kg)   19 186 lb 1.1 oz (84.4 kg)   19 186 lb 1.6 oz (84.4 kg)     There is no height or weight on file to calculate BMI.    CBC:   Lab Results   Component Value Date    WBC 6.7 2019    RBC 3.10 2019    HGB 9.7 2019    HCT 32.9 2019    .1 2019    RDW 16.8 2019     2019       CMP:   Lab

## 2019-07-16 ENCOUNTER — OFFICE VISIT (OUTPATIENT)
Dept: ONCOLOGY | Age: 74
End: 2019-07-16
Payer: COMMERCIAL

## 2019-07-16 ENCOUNTER — TELEPHONE (OUTPATIENT)
Dept: ONCOLOGY | Age: 74
End: 2019-07-16

## 2019-07-16 ENCOUNTER — HOSPITAL ENCOUNTER (OUTPATIENT)
Facility: MEDICAL CENTER | Age: 74
Discharge: HOME | End: 2019-07-16
Payer: COMMERCIAL

## 2019-07-16 ENCOUNTER — HOSPITAL ENCOUNTER (OUTPATIENT)
Dept: INFUSION THERAPY | Facility: MEDICAL CENTER | Age: 74
Discharge: HOME | End: 2019-07-16
Payer: COMMERCIAL

## 2019-07-16 VITALS
TEMPERATURE: 98.2 F | HEART RATE: 64 BPM | WEIGHT: 177 LBS | BODY MASS INDEX: 33.44 KG/M2 | SYSTOLIC BLOOD PRESSURE: 125 MMHG | DIASTOLIC BLOOD PRESSURE: 43 MMHG | RESPIRATION RATE: 24 BRPM

## 2019-07-16 VITALS
HEART RATE: 64 BPM | TEMPERATURE: 98.2 F | SYSTOLIC BLOOD PRESSURE: 125 MMHG | BODY MASS INDEX: 33.44 KG/M2 | WEIGHT: 177 LBS | RESPIRATION RATE: 24 BRPM | DIASTOLIC BLOOD PRESSURE: 43 MMHG

## 2019-07-16 DIAGNOSIS — D64.9 ANEMIA, UNSPECIFIED TYPE: ICD-10-CM

## 2019-07-16 DIAGNOSIS — E61.1 IRON DEFICIENCY: ICD-10-CM

## 2019-07-16 DIAGNOSIS — D63.1 ANEMIA OF CHRONIC RENAL FAILURE, STAGE 4 (SEVERE) (HCC): Primary | ICD-10-CM

## 2019-07-16 DIAGNOSIS — N18.4 STAGE 4 CHRONIC KIDNEY DISEASE (HCC): ICD-10-CM

## 2019-07-16 DIAGNOSIS — K90.89 OTHER SPECIFIED INTESTINAL MALABSORPTION: Primary | ICD-10-CM

## 2019-07-16 DIAGNOSIS — D63.1 ANEMIA OF CHRONIC RENAL FAILURE, STAGE 4 (SEVERE) (HCC): ICD-10-CM

## 2019-07-16 DIAGNOSIS — N18.4 ANEMIA OF CHRONIC RENAL FAILURE, STAGE 4 (SEVERE) (HCC): ICD-10-CM

## 2019-07-16 DIAGNOSIS — K90.89 OTHER SPECIFIED INTESTINAL MALABSORPTION: ICD-10-CM

## 2019-07-16 DIAGNOSIS — N18.4 ANEMIA OF CHRONIC RENAL FAILURE, STAGE 4 (SEVERE) (HCC): Primary | ICD-10-CM

## 2019-07-16 LAB
ABSOLUTE EOS #: 0.16 K/UL (ref 0–0.44)
ABSOLUTE IMMATURE GRANULOCYTE: 0.08 K/UL (ref 0–0.3)
ABSOLUTE LYMPH #: 0.85 K/UL (ref 1.1–3.7)
ABSOLUTE MONO #: 0.65 K/UL (ref 0.1–1.2)
BASOPHILS # BLD: 1 % (ref 0–2)
BASOPHILS ABSOLUTE: 0.05 K/UL (ref 0–0.2)
DIFFERENTIAL TYPE: ABNORMAL
EOSINOPHILS RELATIVE PERCENT: 2 % (ref 1–4)
FOLATE: >20 NG/ML
HCT VFR BLD CALC: 35.2 % (ref 36.3–47.1)
HEMOGLOBIN: 10.4 G/DL (ref 11.9–15.1)
IMMATURE GRANULOCYTES: 1 %
LYMPHOCYTES # BLD: 9 % (ref 24–43)
MCH RBC QN AUTO: 31.8 PG (ref 25.2–33.5)
MCHC RBC AUTO-ENTMCNC: 29.5 G/DL (ref 28.4–34.8)
MCV RBC AUTO: 107.6 FL (ref 82.6–102.9)
MONOCYTES # BLD: 7 % (ref 3–12)
NRBC AUTOMATED: 0 PER 100 WBC
PDW BLD-RTO: 16.1 % (ref 11.8–14.4)
PLATELET # BLD: 136 K/UL (ref 138–453)
PLATELET ESTIMATE: ABNORMAL
PMV BLD AUTO: 10.7 FL (ref 8.1–13.5)
RBC # BLD: 3.27 M/UL (ref 3.95–5.11)
RBC # BLD: ABNORMAL 10*6/UL
SEG NEUTROPHILS: 80 % (ref 36–65)
SEGMENTED NEUTROPHILS ABSOLUTE COUNT: 7.73 K/UL (ref 1.5–8.1)
VITAMIN B-12: >2000 PG/ML (ref 232–1245)
WBC # BLD: 9.5 K/UL (ref 3.5–11.3)
WBC # BLD: ABNORMAL 10*3/UL

## 2019-07-16 PROCEDURE — G8417 CALC BMI ABV UP PARAM F/U: HCPCS | Performed by: INTERNAL MEDICINE

## 2019-07-16 PROCEDURE — 1036F TOBACCO NON-USER: CPT | Performed by: INTERNAL MEDICINE

## 2019-07-16 PROCEDURE — 82746 ASSAY OF FOLIC ACID SERUM: CPT

## 2019-07-16 PROCEDURE — G8427 DOCREV CUR MEDS BY ELIG CLIN: HCPCS | Performed by: INTERNAL MEDICINE

## 2019-07-16 PROCEDURE — G8400 PT W/DXA NO RESULTS DOC: HCPCS | Performed by: INTERNAL MEDICINE

## 2019-07-16 PROCEDURE — G8598 ASA/ANTIPLAT THER USED: HCPCS | Performed by: INTERNAL MEDICINE

## 2019-07-16 PROCEDURE — 99214 OFFICE O/P EST MOD 30 MIN: CPT | Performed by: INTERNAL MEDICINE

## 2019-07-16 PROCEDURE — 82607 VITAMIN B-12: CPT

## 2019-07-16 PROCEDURE — 99211 OFF/OP EST MAY X REQ PHY/QHP: CPT | Performed by: INTERNAL MEDICINE

## 2019-07-16 PROCEDURE — 4040F PNEUMOC VAC/ADMIN/RCVD: CPT | Performed by: INTERNAL MEDICINE

## 2019-07-16 PROCEDURE — 3017F COLORECTAL CA SCREEN DOC REV: CPT | Performed by: INTERNAL MEDICINE

## 2019-07-16 PROCEDURE — 1090F PRES/ABSN URINE INCON ASSESS: CPT | Performed by: INTERNAL MEDICINE

## 2019-07-16 PROCEDURE — 85025 COMPLETE CBC W/AUTO DIFF WBC: CPT

## 2019-07-16 PROCEDURE — 96372 THER/PROPH/DIAG INJ SC/IM: CPT

## 2019-07-16 PROCEDURE — 36415 COLL VENOUS BLD VENIPUNCTURE: CPT

## 2019-07-16 PROCEDURE — 1123F ACP DISCUSS/DSCN MKR DOCD: CPT | Performed by: INTERNAL MEDICINE

## 2019-07-16 PROCEDURE — 6360000002 HC RX W HCPCS: Performed by: INTERNAL MEDICINE

## 2019-07-16 RX ORDER — ACETAMINOPHEN 325 MG/1
325 TABLET ORAL
Status: CANCELLED | OUTPATIENT
Start: 2019-08-06

## 2019-07-16 RX ORDER — CYANOCOBALAMIN 1000 UG/ML
1000 INJECTION INTRAMUSCULAR; SUBCUTANEOUS ONCE
Status: COMPLETED
Start: 2019-07-16 | End: 2019-07-16

## 2019-07-16 RX ORDER — CYANOCOBALAMIN 1000 UG/ML
1000 INJECTION INTRAMUSCULAR; SUBCUTANEOUS ONCE
Status: CANCELLED
Start: 2019-08-06

## 2019-07-16 RX ADMIN — CYANOCOBALAMIN 1000 MCG: 1000 INJECTION, SOLUTION INTRAMUSCULAR at 10:02

## 2019-07-16 ASSESSMENT — PAIN SCALES - GENERAL: PAINLEVEL_OUTOF10: 8

## 2019-07-16 ASSESSMENT — PAIN DESCRIPTION - PAIN TYPE: TYPE: SURGICAL PAIN

## 2019-07-16 ASSESSMENT — PAIN DESCRIPTION - ONSET: ONSET: ON-GOING

## 2019-07-16 ASSESSMENT — PAIN DESCRIPTION - PROGRESSION: CLINICAL_PROGRESSION: NOT CHANGED

## 2019-07-16 ASSESSMENT — PAIN DESCRIPTION - LOCATION: LOCATION: SHOULDER

## 2019-07-16 ASSESSMENT — PAIN DESCRIPTION - DESCRIPTORS: DESCRIPTORS: ACHING

## 2019-07-16 ASSESSMENT — PAIN DESCRIPTION - ORIENTATION: ORIENTATION: RIGHT

## 2019-07-16 ASSESSMENT — PAIN DESCRIPTION - FREQUENCY: FREQUENCY: CONTINUOUS

## 2019-07-16 NOTE — PROGRESS NOTES
routine healing, Constipation, COPD (chronic obstructive pulmonary disease) ([facility] [address] Difficult intravenous access, Difficulty walking, Diverticulosis, DM2 (diabetes mellitus, type 2) ([address] Full dentures, GERD (gastroesophageal reflux disease), H/O fall, Headache(784.0), [place] (hard of hearing), Hyperlipidemia, Hyperplastic polyp of intestine, Hypertension, Impaired ambulation, Kidney stone, Living in nursing home, Morbid obesity with BMI of 40.0-44.9, adult ([address] Muscle weakness, Open wound, ECTOR on CPAP, Osteoarthritis, Parkinson disease ([address] Restless leg syndrome, Spinal stenosis in cervical region, Type II or unspecified type diabetes mellitus without mention of complication, not stated as uncontrolled, Urethral caruncle, Wears glasses, and Wears glasses. PAST SURGICAL HISTORY: has a past surgical history that includes Cervical disc surgery (2012); Appendectomy; Tonsillectomy and adenoidectomy (1953); hernia repair (1999); eye surgery (Bilateral, 2017); Cervical spine surgery (5/31/13); laminectomy (05/31/2013); Upper gastrointestinal endoscopy (12/28/2016); Colonoscopy (2009); Colonoscopy (12/29/2016); Colonoscopy (12/30/2016); Colonoscopy (04/25/2017); pr colsc flx w/removal lesion by hot bx forceps (N/A, 4/25/2017); Cystorrhaphy (04/04/2018); pr cystourethroscopy,biopsies (N/A, 4/4/2018); pr [facility] incl fluor gdnce dx w/cell washg spx (N/A, 6/5/2018); Upper gastrointestinal endoscopy (N/A, 8/29/2018); shoulder fracture surgery (Right, 5/28/2019); Hardware Removal (Right, 07/05/2019); and Hardware Removal (Right, 7/5/2019).      CURRENT MEDICATIONS:  has a current medication list which includes the following prescription(s): calcitriol, calcium citrate, nystatin-triamcinolone, cyclobenzaprine, budesonide-formoterol, furosemide, atorvastatin, carbidopa-levodopa, ropinirole, ipratropium-albuterol, onetouch verio, pantoprazole, linagliptin, one touch ultra test, albuterol, onetouch delica lancets 80X,

## 2019-07-18 ENCOUNTER — OFFICE VISIT (OUTPATIENT)
Dept: ORTHOPEDIC SURGERY | Age: 74
End: 2019-07-18

## 2019-07-18 VITALS — WEIGHT: 177.03 LBS | HEIGHT: 61 IN | BODY MASS INDEX: 33.42 KG/M2

## 2019-07-18 DIAGNOSIS — S42.91XD CLOSED FRACTURE OF RIGHT SHOULDER WITH ROUTINE HEALING, SUBSEQUENT ENCOUNTER: Primary | ICD-10-CM

## 2019-07-18 PROCEDURE — 99024 POSTOP FOLLOW-UP VISIT: CPT | Performed by: ORTHOPAEDIC SURGERY

## 2019-07-22 ENCOUNTER — HOSPITAL ENCOUNTER (OUTPATIENT)
Dept: GENERAL RADIOLOGY | Age: 74
Discharge: HOME | End: 2019-07-24
Payer: COMMERCIAL

## 2019-07-22 ENCOUNTER — HOSPITAL ENCOUNTER (OUTPATIENT)
Age: 74
Discharge: HOME | End: 2019-07-24
Payer: COMMERCIAL

## 2019-07-22 DIAGNOSIS — S42.91XD CLOSED FRACTURE OF RIGHT SHOULDER WITH ROUTINE HEALING, SUBSEQUENT ENCOUNTER: ICD-10-CM

## 2019-07-22 PROCEDURE — 73030 X-RAY EXAM OF SHOULDER: CPT

## 2019-08-06 ENCOUNTER — HOSPITAL ENCOUNTER (OUTPATIENT)
Facility: MEDICAL CENTER | Age: 74
Discharge: HOME | End: 2019-08-06
Payer: COMMERCIAL

## 2019-08-06 ENCOUNTER — HOSPITAL ENCOUNTER (OUTPATIENT)
Dept: INFUSION THERAPY | Facility: MEDICAL CENTER | Age: 74
Discharge: HOME | End: 2019-08-06
Payer: COMMERCIAL

## 2019-08-06 VITALS
TEMPERATURE: 98.5 F | DIASTOLIC BLOOD PRESSURE: 46 MMHG | HEART RATE: 65 BPM | RESPIRATION RATE: 16 BRPM | SYSTOLIC BLOOD PRESSURE: 114 MMHG

## 2019-08-06 DIAGNOSIS — N18.4 ANEMIA OF CHRONIC RENAL FAILURE, STAGE 4 (SEVERE) (HCC): ICD-10-CM

## 2019-08-06 DIAGNOSIS — E61.1 IRON DEFICIENCY: ICD-10-CM

## 2019-08-06 DIAGNOSIS — K90.89 OTHER SPECIFIED INTESTINAL MALABSORPTION: ICD-10-CM

## 2019-08-06 DIAGNOSIS — D64.9 ANEMIA, UNSPECIFIED TYPE: ICD-10-CM

## 2019-08-06 DIAGNOSIS — K90.89 OTHER SPECIFIED INTESTINAL MALABSORPTION: Primary | ICD-10-CM

## 2019-08-06 DIAGNOSIS — D63.1 ANEMIA OF CHRONIC RENAL FAILURE, STAGE 4 (SEVERE) (HCC): ICD-10-CM

## 2019-08-06 LAB
ABSOLUTE EOS #: 0.19 K/UL (ref 0–0.44)
ABSOLUTE IMMATURE GRANULOCYTE: 0.07 K/UL (ref 0–0.3)
ABSOLUTE LYMPH #: 1.33 K/UL (ref 1.1–3.7)
ABSOLUTE MONO #: 0.62 K/UL (ref 0.1–1.2)
BASOPHILS # BLD: 0 % (ref 0–2)
BASOPHILS ABSOLUTE: 0.03 K/UL (ref 0–0.2)
DIFFERENTIAL TYPE: ABNORMAL
EOSINOPHILS RELATIVE PERCENT: 3 % (ref 1–4)
HCT VFR BLD CALC: 31.3 % (ref 36.3–47.1)
HEMOGLOBIN: 9.4 G/DL (ref 11.9–15.1)
IMMATURE GRANULOCYTES: 1 %
LYMPHOCYTES # BLD: 18 % (ref 24–43)
MCH RBC QN AUTO: 31.9 PG (ref 25.2–33.5)
MCHC RBC AUTO-ENTMCNC: 30 G/DL (ref 28.4–34.8)
MCV RBC AUTO: 106.1 FL (ref 82.6–102.9)
MONOCYTES # BLD: 9 % (ref 3–12)
NRBC AUTOMATED: 0 PER 100 WBC
PDW BLD-RTO: 15.4 % (ref 11.8–14.4)
PLATELET # BLD: 112 K/UL (ref 138–453)
PLATELET ESTIMATE: ABNORMAL
PMV BLD AUTO: 10.8 FL (ref 8.1–13.5)
RBC # BLD: 2.95 M/UL (ref 3.95–5.11)
RBC # BLD: ABNORMAL 10*6/UL
SEG NEUTROPHILS: 69 % (ref 36–65)
SEGMENTED NEUTROPHILS ABSOLUTE COUNT: 5.01 K/UL (ref 1.5–8.1)
WBC # BLD: 7.3 K/UL (ref 3.5–11.3)
WBC # BLD: ABNORMAL 10*3/UL

## 2019-08-06 PROCEDURE — 36415 COLL VENOUS BLD VENIPUNCTURE: CPT

## 2019-08-06 PROCEDURE — 6360000002 HC RX W HCPCS: Performed by: INTERNAL MEDICINE

## 2019-08-06 PROCEDURE — 96372 THER/PROPH/DIAG INJ SC/IM: CPT

## 2019-08-06 PROCEDURE — 85025 COMPLETE CBC W/AUTO DIFF WBC: CPT

## 2019-08-06 RX ORDER — CYANOCOBALAMIN 1000 UG/ML
1000 INJECTION INTRAMUSCULAR; SUBCUTANEOUS ONCE
Status: CANCELLED
Start: 2019-08-13

## 2019-08-06 RX ORDER — ACETAMINOPHEN 325 MG/1
325 TABLET ORAL
Status: CANCELLED | OUTPATIENT
Start: 2019-08-13

## 2019-08-06 RX ADMIN — DARBEPOETIN ALFA 200 MCG: 200 INJECTION, SOLUTION INTRAVENOUS; SUBCUTANEOUS at 14:23

## 2019-08-06 NOTE — PROGRESS NOTES
Ban Handsome arrives per wheelchair with family  Order for [employer] reviewed and noted hg is 9.4  aranesp 200 mcg given as ordered to lung upper arm  Band aid applied  Discharged per wheelchair

## 2019-08-23 ENCOUNTER — HOSPITAL ENCOUNTER (OUTPATIENT)
Facility: MEDICAL CENTER | Age: 74
End: 2019-08-23
Payer: MEDICARE

## 2019-08-25 ENCOUNTER — APPOINTMENT (OUTPATIENT)
Dept: GENERAL RADIOLOGY | Age: 74
DRG: 287 | End: 2019-08-25
Payer: COMMERCIAL

## 2019-08-25 ENCOUNTER — HOSPITAL ENCOUNTER (INPATIENT)
Age: 74
LOS: 15 days | Discharge: OTHER INSTITUTION | DRG: 287 | End: 2019-09-11
Attending: EMERGENCY MEDICINE | Admitting: INTERNAL MEDICINE
Payer: COMMERCIAL

## 2019-08-25 DIAGNOSIS — M79.604 CHRONIC PAIN OF RIGHT LOWER EXTREMITY: ICD-10-CM

## 2019-08-25 DIAGNOSIS — R07.9 CHEST PAIN, UNSPECIFIED TYPE: ICD-10-CM

## 2019-08-25 DIAGNOSIS — M79.89 RIGHT LEG SWELLING: Primary | ICD-10-CM

## 2019-08-25 DIAGNOSIS — G89.29 CHRONIC PAIN OF RIGHT LOWER EXTREMITY: ICD-10-CM

## 2019-08-25 DIAGNOSIS — I72.4 FEMORAL ARTERY PSEUDO-ANEURYSM, LEFT (HCC): ICD-10-CM

## 2019-08-25 PROBLEM — J96.11 CHRONIC RESPIRATORY FAILURE WITH HYPOXIA (HCC): Status: ACTIVE | Noted: 2018-09-23

## 2019-08-25 LAB
-: ABNORMAL
ABSOLUTE EOS #: 0.12 K/UL (ref 0–0.44)
ABSOLUTE IMMATURE GRANULOCYTE: 0.06 K/UL (ref 0–0.3)
ABSOLUTE LYMPH #: 1.09 K/UL (ref 1.1–3.7)
ABSOLUTE MONO #: 0.67 K/UL (ref 0.1–1.2)
AMORPHOUS: ABNORMAL
ANION GAP SERPL CALCULATED.3IONS-SCNC: 12 MMOL/L (ref 9–17)
BACTERIA: ABNORMAL
BASOPHILS # BLD: 1 % (ref 0–2)
BASOPHILS ABSOLUTE: 0.03 K/UL (ref 0–0.2)
BILIRUBIN URINE: NEGATIVE
BUN BLDV-MCNC: 41 MG/DL (ref 8–23)
BUN/CREAT BLD: 20 (ref 9–20)
CALCIUM SERPL-MCNC: 8.9 MG/DL (ref 8.6–10.4)
CASTS UA: ABNORMAL /LPF
CHLORIDE BLD-SCNC: 95 MMOL/L (ref 98–107)
CO2: 33 MMOL/L (ref 20–31)
COLOR: YELLOW
COMMENT UA: ABNORMAL
CREAT SERPL-MCNC: 2.08 MG/DL (ref 0.5–0.9)
CRYSTALS, UA: ABNORMAL /HPF
DIFFERENTIAL TYPE: ABNORMAL
EOSINOPHILS RELATIVE PERCENT: 2 % (ref 1–4)
EPITHELIAL CELLS UA: ABNORMAL /HPF (ref 0–5)
GFR AFRICAN AMERICAN: 28 ML/MIN
GFR NON-AFRICAN AMERICAN: 23 ML/MIN
GFR SERPL CREATININE-BSD FRML MDRD: ABNORMAL ML/MIN/{1.73_M2}
GFR SERPL CREATININE-BSD FRML MDRD: ABNORMAL ML/MIN/{1.73_M2}
GLUCOSE BLD-MCNC: 109 MG/DL (ref 65–105)
GLUCOSE BLD-MCNC: 136 MG/DL (ref 70–99)
GLUCOSE BLD-MCNC: 91 MG/DL (ref 65–105)
GLUCOSE URINE: NEGATIVE
HCT VFR BLD CALC: 31.2 % (ref 36.3–47.1)
HEMOGLOBIN: 9.5 G/DL (ref 11.9–15.1)
IMMATURE GRANULOCYTES: 1 %
INR BLD: 1
KETONES, URINE: NEGATIVE
LEUKOCYTE ESTERASE, URINE: ABNORMAL
LYMPHOCYTES # BLD: 18 % (ref 24–43)
MCH RBC QN AUTO: 32.5 PG (ref 25.2–33.5)
MCHC RBC AUTO-ENTMCNC: 30.4 G/DL (ref 28.4–34.8)
MCV RBC AUTO: 106.8 FL (ref 82.6–102.9)
MONOCYTES # BLD: 11 % (ref 3–12)
MUCUS: ABNORMAL
MYOGLOBIN: 100 NG/ML (ref 25–58)
MYOGLOBIN: 102 NG/ML (ref 25–58)
MYOGLOBIN: 105 NG/ML (ref 25–58)
MYOGLOBIN: 109 NG/ML (ref 25–58)
NITRITE, URINE: NEGATIVE
NRBC AUTOMATED: 0 PER 100 WBC
OTHER OBSERVATIONS UA: ABNORMAL
PARTIAL THROMBOPLASTIN TIME: 26.5 SEC (ref 23–31)
PDW BLD-RTO: 15.6 % (ref 11.8–14.4)
PH UA: 7 (ref 5–8)
PLATELET # BLD: 118 K/UL (ref 138–453)
PLATELET ESTIMATE: ABNORMAL
PMV BLD AUTO: 10.5 FL (ref 8.1–13.5)
POTASSIUM SERPL-SCNC: 5.2 MMOL/L (ref 3.7–5.3)
PROTEIN UA: NEGATIVE
PROTHROMBIN TIME: 10 SEC (ref 9.7–11.6)
RBC # BLD: 2.92 M/UL (ref 3.95–5.11)
RBC # BLD: ABNORMAL 10*6/UL
RBC UA: ABNORMAL /HPF (ref 0–2)
RENAL EPITHELIAL, UA: ABNORMAL /HPF
SEG NEUTROPHILS: 67 % (ref 36–65)
SEGMENTED NEUTROPHILS ABSOLUTE COUNT: 4.21 K/UL (ref 1.5–8.1)
SODIUM BLD-SCNC: 140 MMOL/L (ref 135–144)
SPECIFIC GRAVITY UA: 1.01 (ref 1–1.03)
TRICHOMONAS: ABNORMAL
TROPONIN INTERP: ABNORMAL
TROPONIN T: ABNORMAL NG/ML
TROPONIN, HIGH SENSITIVITY: 34 NG/L (ref 0–14)
TROPONIN, HIGH SENSITIVITY: 36 NG/L (ref 0–14)
TROPONIN, HIGH SENSITIVITY: 37 NG/L (ref 0–14)
TROPONIN, HIGH SENSITIVITY: 37 NG/L (ref 0–14)
TURBIDITY: ABNORMAL
URINE HGB: ABNORMAL
UROBILINOGEN, URINE: NORMAL
WBC # BLD: 6.2 K/UL (ref 3.5–11.3)
WBC # BLD: ABNORMAL 10*3/UL
WBC UA: ABNORMAL /HPF (ref 0–5)
YEAST: ABNORMAL

## 2019-08-25 PROCEDURE — 85025 COMPLETE CBC W/AUTO DIFF WBC: CPT

## 2019-08-25 PROCEDURE — G0378 HOSPITAL OBSERVATION PER HR: HCPCS

## 2019-08-25 PROCEDURE — 2580000003 HC RX 258: Performed by: INTERNAL MEDICINE

## 2019-08-25 PROCEDURE — 2700000000 HC OXYGEN THERAPY PER DAY

## 2019-08-25 PROCEDURE — 94640 AIRWAY INHALATION TREATMENT: CPT

## 2019-08-25 PROCEDURE — 96372 THER/PROPH/DIAG INJ SC/IM: CPT

## 2019-08-25 PROCEDURE — 36415 COLL VENOUS BLD VENIPUNCTURE: CPT

## 2019-08-25 PROCEDURE — 87086 URINE CULTURE/COLONY COUNT: CPT

## 2019-08-25 PROCEDURE — 6370000000 HC RX 637 (ALT 250 FOR IP): Performed by: INTERNAL MEDICINE

## 2019-08-25 PROCEDURE — 80048 BASIC METABOLIC PNL TOTAL CA: CPT

## 2019-08-25 PROCEDURE — 71045 X-RAY EXAM CHEST 1 VIEW: CPT

## 2019-08-25 PROCEDURE — 6360000002 HC RX W HCPCS: Performed by: INTERNAL MEDICINE

## 2019-08-25 PROCEDURE — 93005 ELECTROCARDIOGRAM TRACING: CPT | Performed by: EMERGENCY MEDICINE

## 2019-08-25 PROCEDURE — 85730 THROMBOPLASTIN TIME PARTIAL: CPT

## 2019-08-25 PROCEDURE — 83874 ASSAY OF MYOGLOBIN: CPT

## 2019-08-25 PROCEDURE — 83036 HEMOGLOBIN GLYCOSYLATED A1C: CPT

## 2019-08-25 PROCEDURE — 82947 ASSAY GLUCOSE BLOOD QUANT: CPT

## 2019-08-25 PROCEDURE — 99285 EMERGENCY DEPT VISIT HI MDM: CPT

## 2019-08-25 PROCEDURE — 85610 PROTHROMBIN TIME: CPT

## 2019-08-25 PROCEDURE — 81001 URINALYSIS AUTO W/SCOPE: CPT

## 2019-08-25 PROCEDURE — 84484 ASSAY OF TROPONIN QUANT: CPT

## 2019-08-25 RX ORDER — FUROSEMIDE 20 MG/1
20 TABLET ORAL DAILY
Status: DISCONTINUED | OUTPATIENT
Start: 2019-08-26 | End: 2019-08-27

## 2019-08-25 RX ORDER — ATORVASTATIN CALCIUM 20 MG/1
20 TABLET, FILM COATED ORAL DAILY
Status: DISCONTINUED | OUTPATIENT
Start: 2019-08-25 | End: 2019-09-11 | Stop reason: HOSPADM

## 2019-08-25 RX ORDER — MELATONIN 5 MG
5 TABLET,CHEWABLE ORAL NIGHTLY PRN
Status: DISCONTINUED | OUTPATIENT
Start: 2019-08-25 | End: 2019-09-11 | Stop reason: HOSPADM

## 2019-08-25 RX ORDER — SODIUM CHLORIDE 0.9 % (FLUSH) 0.9 %
10 SYRINGE (ML) INJECTION PRN
Status: DISCONTINUED | OUTPATIENT
Start: 2019-08-25 | End: 2019-08-30 | Stop reason: SDUPTHER

## 2019-08-25 RX ORDER — PANTOPRAZOLE SODIUM 40 MG/1
40 TABLET, DELAYED RELEASE ORAL
Status: DISCONTINUED | OUTPATIENT
Start: 2019-08-25 | End: 2019-09-11 | Stop reason: HOSPADM

## 2019-08-25 RX ORDER — CARBIDOPA AND LEVODOPA 25; 100 MG/1; MG/1
1 TABLET, EXTENDED RELEASE ORAL 3 TIMES DAILY
Status: DISCONTINUED | OUTPATIENT
Start: 2019-08-25 | End: 2019-09-11 | Stop reason: HOSPADM

## 2019-08-25 RX ORDER — PANTOPRAZOLE SODIUM 40 MG/1
40 TABLET, DELAYED RELEASE ORAL
Status: DISCONTINUED | OUTPATIENT
Start: 2019-08-26 | End: 2019-08-25

## 2019-08-25 RX ORDER — ASPIRIN 81 MG/1
81 TABLET, CHEWABLE ORAL DAILY
Status: DISCONTINUED | OUTPATIENT
Start: 2019-08-26 | End: 2019-08-25 | Stop reason: CLARIF

## 2019-08-25 RX ORDER — CHOLECALCIFEROL (VITAMIN D3) 125 MCG
5 CAPSULE ORAL NIGHTLY PRN
COMMUNITY

## 2019-08-25 RX ORDER — ALBUTEROL SULFATE 2.5 MG/3ML
2.5 SOLUTION RESPIRATORY (INHALATION)
Status: DISCONTINUED | OUTPATIENT
Start: 2019-08-25 | End: 2019-09-11 | Stop reason: HOSPADM

## 2019-08-25 RX ORDER — AMIODARONE HYDROCHLORIDE 200 MG/1
200 TABLET ORAL DAILY
Status: DISCONTINUED | OUTPATIENT
Start: 2019-08-26 | End: 2019-09-11 | Stop reason: HOSPADM

## 2019-08-25 RX ORDER — SENNA PLUS 8.6 MG/1
2 TABLET ORAL NIGHTLY
Status: DISCONTINUED | OUTPATIENT
Start: 2019-08-25 | End: 2019-09-11 | Stop reason: HOSPADM

## 2019-08-25 RX ORDER — DEXTROSE MONOHYDRATE 50 MG/ML
100 INJECTION, SOLUTION INTRAVENOUS PRN
Status: DISCONTINUED | OUTPATIENT
Start: 2019-08-25 | End: 2019-09-11 | Stop reason: HOSPADM

## 2019-08-25 RX ORDER — ASPIRIN 81 MG/1
81 TABLET ORAL DAILY
Status: DISCONTINUED | OUTPATIENT
Start: 2019-08-26 | End: 2019-09-11 | Stop reason: HOSPADM

## 2019-08-25 RX ORDER — CYCLOBENZAPRINE HCL 5 MG
5 TABLET ORAL NIGHTLY PRN
Status: DISCONTINUED | OUTPATIENT
Start: 2019-08-25 | End: 2019-09-04

## 2019-08-25 RX ORDER — SULFAMETHOXAZOLE AND TRIMETHOPRIM 800; 160 MG/1; MG/1
1 TABLET ORAL 2 TIMES DAILY
Status: ON HOLD | COMMUNITY
End: 2019-09-11 | Stop reason: HOSPADM

## 2019-08-25 RX ORDER — DEXTROSE MONOHYDRATE 25 G/50ML
12.5 INJECTION, SOLUTION INTRAVENOUS PRN
Status: DISCONTINUED | OUTPATIENT
Start: 2019-08-25 | End: 2019-09-11 | Stop reason: HOSPADM

## 2019-08-25 RX ORDER — IPRATROPIUM BROMIDE AND ALBUTEROL SULFATE 2.5; .5 MG/3ML; MG/3ML
3 SOLUTION RESPIRATORY (INHALATION) 4 TIMES DAILY
Status: DISCONTINUED | OUTPATIENT
Start: 2019-08-25 | End: 2019-08-27

## 2019-08-25 RX ORDER — RASAGILINE 1 MG/1
1 TABLET ORAL DAILY
Status: DISCONTINUED | OUTPATIENT
Start: 2019-08-25 | End: 2019-09-11 | Stop reason: HOSPADM

## 2019-08-25 RX ORDER — HEPARIN SODIUM 5000 [USP'U]/ML
5000 INJECTION, SOLUTION INTRAVENOUS; SUBCUTANEOUS EVERY 8 HOURS SCHEDULED
Status: DISCONTINUED | OUTPATIENT
Start: 2019-08-25 | End: 2019-09-05

## 2019-08-25 RX ORDER — NICOTINE POLACRILEX 4 MG
15 LOZENGE BUCCAL PRN
Status: DISCONTINUED | OUTPATIENT
Start: 2019-08-25 | End: 2019-09-11 | Stop reason: HOSPADM

## 2019-08-25 RX ORDER — LANOLIN ALCOHOL/MO/W.PET/CERES
3 CREAM (GRAM) TOPICAL NIGHTLY PRN
Status: DISCONTINUED | OUTPATIENT
Start: 2019-08-25 | End: 2019-08-25 | Stop reason: DRUGHIGH

## 2019-08-25 RX ORDER — ROPINIROLE 2 MG/1
2 TABLET, FILM COATED ORAL 3 TIMES DAILY
Status: DISCONTINUED | OUTPATIENT
Start: 2019-08-25 | End: 2019-09-11 | Stop reason: HOSPADM

## 2019-08-25 RX ORDER — ASPIRIN 81 MG/1
324 TABLET, CHEWABLE ORAL ONCE
Status: DISCONTINUED | OUTPATIENT
Start: 2019-08-25 | End: 2019-09-11 | Stop reason: HOSPADM

## 2019-08-25 RX ORDER — SODIUM CHLORIDE 0.9 % (FLUSH) 0.9 %
10 SYRINGE (ML) INJECTION EVERY 12 HOURS SCHEDULED
Status: DISCONTINUED | OUTPATIENT
Start: 2019-08-25 | End: 2019-08-30 | Stop reason: SDUPTHER

## 2019-08-25 RX ADMIN — HEPARIN SODIUM 5000 UNITS: 5000 INJECTION INTRAVENOUS; SUBCUTANEOUS at 18:15

## 2019-08-25 RX ADMIN — CARBIDOPA AND LEVODOPA 1 TABLET: 25; 100 TABLET, EXTENDED RELEASE ORAL at 20:44

## 2019-08-25 RX ADMIN — HEPARIN SODIUM 5000 UNITS: 5000 INJECTION INTRAVENOUS; SUBCUTANEOUS at 23:19

## 2019-08-25 RX ADMIN — ROPINIROLE HYDROCHLORIDE 2 MG: 2 TABLET, FILM COATED ORAL at 20:45

## 2019-08-25 RX ADMIN — IPRATROPIUM BROMIDE AND ALBUTEROL SULFATE 3 ML: .5; 3 SOLUTION RESPIRATORY (INHALATION) at 15:57

## 2019-08-25 RX ADMIN — ATORVASTATIN CALCIUM 20 MG: 20 TABLET, FILM COATED ORAL at 20:44

## 2019-08-25 RX ADMIN — SODIUM CHLORIDE, PRESERVATIVE FREE 10 ML: 5 INJECTION INTRAVENOUS at 20:45

## 2019-08-25 RX ADMIN — PANTOPRAZOLE SODIUM 40 MG: 40 TABLET, DELAYED RELEASE ORAL at 18:14

## 2019-08-25 RX ADMIN — Medication 5 MG: at 23:36

## 2019-08-25 RX ADMIN — Medication 2 PUFF: at 19:31

## 2019-08-25 RX ADMIN — IPRATROPIUM BROMIDE AND ALBUTEROL SULFATE 3 ML: .5; 3 SOLUTION RESPIRATORY (INHALATION) at 19:31

## 2019-08-25 RX ADMIN — SENNOSIDES 17.2 MG: 8.6 TABLET, FILM COATED ORAL at 20:45

## 2019-08-25 ASSESSMENT — ENCOUNTER SYMPTOMS
EYE REDNESS: 0
EYE DISCHARGE: 0
SHORTNESS OF BREATH: 0
VOMITING: 0
CONSTIPATION: 0
FACIAL SWELLING: 0
COUGH: 0
COLOR CHANGE: 0
ABDOMINAL PAIN: 0
DIARRHEA: 0

## 2019-08-25 ASSESSMENT — PAIN SCALES - GENERAL
PAINLEVEL_OUTOF10: 0
PAINLEVEL_OUTOF10: 2

## 2019-08-25 NOTE — H&P
MG TABS tablet, Take 1 tablet by mouth 2 times daily  senna (SENOKOT) 8.6 MG tablet, Take 2 tablets by mouth nightly   aspirin 81 MG tablet, Take 81 mg by mouth daily   ferrous sulfate 325 (65 Fe) MG EC tablet, Take 1 tablet by mouth 2 times daily (with meals)  vitamin D (CHOLECALCIFEROL) 5000 units CAPS capsule, Take 5,000 Units by mouth daily  rasagiline mesylate 1 MG TABS, Take 1 tablet by mouth daily  Oxygen Concentrator, Dx: Pneumonia, use as directed. (Patient taking differently: Dx: Pneumonia, use as directed. ON 24/7)    Allergies:    Dye [barium-containing compounds]; Pcn [penicillins]; and Bactrim [sulfamethoxazole-trimethoprim]    Social History:    reports that she quit smoking about 48 years ago. Her smoking use included cigarettes. She has a 30.00 pack-year smoking history. She has never used smokeless tobacco. She reports that she drinks about 2.0 standard drinks of alcohol per week. She reports that she does not use drugs. Family History:   family history includes Heart Disease in her mother; Heart Failure in her mother; High Blood Pressure in her mother; Hypertension in her mother.     REVIEW OF SYSTEMS:    Constitutional: negative, Fever no chills  Eyes: negative  Ears, nose, mouth, throat, and face: negative  Respiratory: negative, Shortness of breath with exertion no tachypnea no wheezing no PND no orthopnea no cough  Cardiovascular: negative, Chest pain no palpitation no dizziness no syncopal episode  Gastrointestinal: negative, Vomiting no heartburn no dysphagia no change in bowel habits no blood in the stool  Genitourinary:negative  Integument/breast: negative  Hematologic/lymphatic: negative  Musculoskeletal:negative  Neurological: negative  Behavioral/Psych: negative  Endocrine: negative, Polyuria no polydipsia no hypoglycemia  Allergic/Immunologic: negative  PHYSICAL EXAM:  General Appearance: alert and oriented to person, place and time and in no acute distress  Skin: warm and dry, no

## 2019-08-25 NOTE — ED PROVIDER NOTES
Take 1 tablet by mouth daily    IPRATROPIUM-ALBUTEROL (DUONEB) 0.5-2.5 (3) MG/3ML SOLN NEBULIZER SOLUTION    Inhale 3 mLs into the lungs three times daily    LINAGLIPTIN (TRADJENTA) 5 MG TABLET    Take 0.5 tablets by mouth daily    MAGNESIUM OXIDE (MAG-OX) 400 (241.3 MG) MG TABS TABLET    Take 1 tablet by mouth 2 times daily    MELATONIN 3 MG TABS TABLET    Take 1 tablet by mouth nightly as needed (sleep)    NYSTATIN-TRIAMCINOLONE (MYCOLOG II) 953000-4.1 UNIT/GM-% CREAM    Apply topically 2 times daily abd folds    ONE TOUCH ULTRA TEST STRIP    USE 1 STRIP THREE TIMES A DAY    ONETOUCH DELICA LANCETS 08Z MISC    1 Units by Does not apply route 2 times daily    ONETOUCH VERIO STRIP    1 each by In Vitro route daily As needed. OXYGEN CONCENTRATOR    Dx: Pneumonia, use as directed. PANTOPRAZOLE (PROTONIX) 40 MG TABLET    TAKE 1 TABLET TWICE A DAY BEFORE MEALS    RASAGILINE MESYLATE 1 MG TABS    Take 1 tablet by mouth daily    ROPINIROLE (REQUIP) 2 MG TABLET    Take 1 tablet by mouth 3 times daily    SENNA (SENOKOT) 8.6 MG TABLET    Take 2 tablets by mouth nightly     VITAMIN D (CHOLECALCIFEROL) 5000 UNITS CAPS CAPSULE    Take 5,000 Units by mouth daily       PAST MEDICAL HISTORY         Diagnosis Date    Acute on chronic diastolic congestive heart failure (HCC)     Anesthesia complication     DIFFICULTY WAKING OP    Asthma     Atrial fibrillation ([facility] [address] 2013    Cataracts, bilateral     Cellulitis     BILAT LOWER LEGS-PT STATES IS IMPROVING    Cerebral artery occlusion with cerebral infarction [facility])     Last stroke was February 2017 w/no deficits-TOTAL OF 3    CHF (congestive heart failure) (HCC)     Chronic kidney disease 2013    NOT ON DIALYSIS YET    Chronic kidney disease     Closed fracture of proximal end of right humerus with routine healing     Constipation     CHRONIC    COPD (chronic obstructive pulmonary disease) ([facility] [address] 2008    PT. SEES DR. BECK. Home O2 at 2-3L/NC 24/7.     Difficult intravenous access     VEINS ROLL    Difficulty walking     AMBULATES WITH THERAPPY    Diverticulosis     DM2 (diabetes mellitus, type 2) ([facility] [address] 2008    Full dentures     FULL UPPER ONLY    GERD (gastroesophageal reflux disease) 2008    ON RX    H/O fall     Headache(784.0)     [place] (hard of hearing)     HAS HEARING AIDS BUT DOES NOT WEAR    Hyperlipidemia     Hyperplastic polyp of intestine     Hypertension 2008    Impaired ambulation     USES TRANFER CHAIR WALKER OR CANE    Kidney stone 2018    PRESENTLY-SMALL    Living in nursing home     [address]    Morbid obesity with BMI of 40.0-44.9, adult ([facility] [address] 12/30/2016    Muscle weakness     Open wound     COCCYX    ECTOR on CPAP 2008    USES C-PAP NIGHTLY    Osteoarthritis     OSTEOARTHRITIS    Parkinson disease ([facility] [address] 2015    Restless leg syndrome 2013    MILD    Spinal stenosis in cervical region 6/2/2013    Type II or unspecified type diabetes mellitus without mention of complication, not stated as uncontrolled     Urethral caruncle 3/8/2018    Wears glasses     Wears glasses        SURGICAL HISTORY           Procedure Laterality Date    APPENDECTOMY      CERVICAL [address] SURGERY  2012    CERVICAL SPINE SURGERY  5/31/13    posterior c5-t1    COLONOSCOPY  2009    COLONOSCOPY  12/29/2016    incomplete was not cleaned out    COLONOSCOPY  12/30/2016    COLONOSCOPY  04/25/2017     SIGMOID COLON POLYPECTOMY:  HYPERPLASTIC POLYP ,   DIVERTICULOSIS    CYSTORRHAPHY  04/04/2018    EYE SURGERY Bilateral 2017    CATARACTS W/ IOL    HARDWARE REMOVAL Right 07/05/2019    HARDWARE REMOVAL PINS  HUMERUS     HARDWARE REMOVAL Right 7/5/2019    HARDWARE REMOVAL PINS  HUMERUS  C-ARM performed by Anamaria Rankin MD at [address] [facility]  05/31/2013    Dr. Zacarias Rider DX W/CELL WASHG 100 [facility] N/A 6/5/2018    BRONCHOSCOPY performed by Ana Ro MD troponin is not significantly elevated. Creatinine is 2.08. She is being admitted for observation. Treatment diagnosis and disposition were discussed with the patient and her . CONSULTS:  None    PROCEDURES:  None    FINAL IMPRESSION      1. Chest pain, unspecified type          DISPOSITION/PLAN   DISPOSITION Decision To Admit 08/25/2019 11:39:49 AM      PATIENT REFERRED TO:   No follow-up provider specified.     DISCHARGE MEDICATIONS:     New Prescriptions    No medications on file         (Please note that portions of this note were completed with a voice recognition program.  Efforts were made to edit the dictations but occasionally words are mis-transcribed.)    Galo Castañeda MD  Attending Emergency Physician           Galo Castañeda MD  08/25/19 6847

## 2019-08-26 ENCOUNTER — TELEPHONE (OUTPATIENT)
Dept: ONCOLOGY | Age: 74
End: 2019-08-26

## 2019-08-26 ENCOUNTER — APPOINTMENT (OUTPATIENT)
Dept: NUCLEAR MEDICINE | Age: 74
DRG: 287 | End: 2019-08-26
Payer: COMMERCIAL

## 2019-08-26 LAB
ABSOLUTE EOS #: 0.13 K/UL (ref 0–0.44)
ABSOLUTE IMMATURE GRANULOCYTE: 0.05 K/UL (ref 0–0.3)
ABSOLUTE LYMPH #: 1.77 K/UL (ref 1.1–3.7)
ABSOLUTE MONO #: 0.71 K/UL (ref 0.1–1.2)
ANION GAP SERPL CALCULATED.3IONS-SCNC: 12 MMOL/L (ref 9–17)
BASOPHILS # BLD: 0 % (ref 0–2)
BASOPHILS ABSOLUTE: 0.03 K/UL (ref 0–0.2)
BUN BLDV-MCNC: 42 MG/DL (ref 8–23)
BUN/CREAT BLD: 18 (ref 9–20)
CALCIUM SERPL-MCNC: 9.1 MG/DL (ref 8.6–10.4)
CHLORIDE BLD-SCNC: 97 MMOL/L (ref 98–107)
CO2: 31 MMOL/L (ref 20–31)
CREAT SERPL-MCNC: 2.4 MG/DL (ref 0.5–0.9)
DIFFERENTIAL TYPE: ABNORMAL
EKG ATRIAL RATE: 66 BPM
EKG P AXIS: 101 DEGREES
EKG P-R INTERVAL: 212 MS
EKG Q-T INTERVAL: 436 MS
EKG QRS DURATION: 84 MS
EKG QTC CALCULATION (BAZETT): 457 MS
EKG R AXIS: -21 DEGREES
EKG T AXIS: 66 DEGREES
EKG VENTRICULAR RATE: 66 BPM
EOSINOPHILS RELATIVE PERCENT: 2 % (ref 1–4)
ESTIMATED AVERAGE GLUCOSE: 120 MG/DL
GFR AFRICAN AMERICAN: 24 ML/MIN
GFR NON-AFRICAN AMERICAN: 20 ML/MIN
GFR SERPL CREATININE-BSD FRML MDRD: ABNORMAL ML/MIN/{1.73_M2}
GFR SERPL CREATININE-BSD FRML MDRD: ABNORMAL ML/MIN/{1.73_M2}
GLUCOSE BLD-MCNC: 108 MG/DL (ref 70–99)
GLUCOSE BLD-MCNC: 116 MG/DL (ref 65–105)
GLUCOSE BLD-MCNC: 124 MG/DL (ref 65–105)
GLUCOSE BLD-MCNC: 131 MG/DL (ref 65–105)
GLUCOSE BLD-MCNC: 171 MG/DL (ref 65–105)
HBA1C MFR BLD: 5.8 % (ref 4–6)
HCT VFR BLD CALC: 32.9 % (ref 36.3–47.1)
HEMOGLOBIN: 9.8 G/DL (ref 11.9–15.1)
IMMATURE GRANULOCYTES: 1 %
LV EF: 45 %
LV EF: 48 %
LVEF MODALITY: NORMAL
LVEF MODALITY: NORMAL
LYMPHOCYTES # BLD: 23 % (ref 24–43)
MCH RBC QN AUTO: 31.6 PG (ref 25.2–33.5)
MCHC RBC AUTO-ENTMCNC: 29.8 G/DL (ref 28.4–34.8)
MCV RBC AUTO: 106.1 FL (ref 82.6–102.9)
MONOCYTES # BLD: 9 % (ref 3–12)
MYOGLOBIN: 96 NG/ML (ref 25–58)
NRBC AUTOMATED: 0 PER 100 WBC
PDW BLD-RTO: 15.7 % (ref 11.8–14.4)
PLATELET # BLD: 100 K/UL (ref 138–453)
PLATELET ESTIMATE: ABNORMAL
PMV BLD AUTO: 10.5 FL (ref 8.1–13.5)
POTASSIUM SERPL-SCNC: 5.1 MMOL/L (ref 3.7–5.3)
RBC # BLD: 3.1 M/UL (ref 3.95–5.11)
RBC # BLD: ABNORMAL 10*6/UL
SEG NEUTROPHILS: 65 % (ref 36–65)
SEGMENTED NEUTROPHILS ABSOLUTE COUNT: 4.96 K/UL (ref 1.5–8.1)
SODIUM BLD-SCNC: 140 MMOL/L (ref 135–144)
TROPONIN INTERP: ABNORMAL
TROPONIN T: ABNORMAL NG/ML
TROPONIN, HIGH SENSITIVITY: 35 NG/L (ref 0–14)
WBC # BLD: 7.7 K/UL (ref 3.5–11.3)
WBC # BLD: ABNORMAL 10*3/UL

## 2019-08-26 PROCEDURE — 6370000000 HC RX 637 (ALT 250 FOR IP): Performed by: INTERNAL MEDICINE

## 2019-08-26 PROCEDURE — 36415 COLL VENOUS BLD VENIPUNCTURE: CPT

## 2019-08-26 PROCEDURE — G0378 HOSPITAL OBSERVATION PER HR: HCPCS

## 2019-08-26 PROCEDURE — 93923 UPR/LXTR ART STDY 3+ LVLS: CPT

## 2019-08-26 PROCEDURE — 85025 COMPLETE CBC W/AUTO DIFF WBC: CPT

## 2019-08-26 PROCEDURE — 93017 CV STRESS TEST TRACING ONLY: CPT

## 2019-08-26 PROCEDURE — 80048 BASIC METABOLIC PNL TOTAL CA: CPT

## 2019-08-26 PROCEDURE — A9500 TC99M SESTAMIBI: HCPCS | Performed by: INTERNAL MEDICINE

## 2019-08-26 PROCEDURE — 6360000002 HC RX W HCPCS: Performed by: INTERNAL MEDICINE

## 2019-08-26 PROCEDURE — 2580000003 HC RX 258: Performed by: INTERNAL MEDICINE

## 2019-08-26 PROCEDURE — 3430000000 HC RX DIAGNOSTIC RADIOPHARMACEUTICAL: Performed by: INTERNAL MEDICINE

## 2019-08-26 PROCEDURE — 82947 ASSAY GLUCOSE BLOOD QUANT: CPT

## 2019-08-26 PROCEDURE — 94640 AIRWAY INHALATION TREATMENT: CPT

## 2019-08-26 PROCEDURE — 96372 THER/PROPH/DIAG INJ SC/IM: CPT

## 2019-08-26 PROCEDURE — 93306 TTE W/DOPPLER COMPLETE: CPT

## 2019-08-26 PROCEDURE — 2700000000 HC OXYGEN THERAPY PER DAY

## 2019-08-26 PROCEDURE — 93010 ELECTROCARDIOGRAM REPORT: CPT | Performed by: INTERNAL MEDICINE

## 2019-08-26 PROCEDURE — 2580000003 HC RX 258: Performed by: NURSE PRACTITIONER

## 2019-08-26 PROCEDURE — 78452 HT MUSCLE IMAGE SPECT MULT: CPT

## 2019-08-26 RX ORDER — CYANOCOBALAMIN 1000 UG/ML
1000 INJECTION INTRAMUSCULAR; SUBCUTANEOUS
Status: ON HOLD | COMMUNITY
End: 2020-03-04 | Stop reason: HOSPADM

## 2019-08-26 RX ORDER — AMINOPHYLLINE DIHYDRATE 25 MG/ML
50 INJECTION, SOLUTION INTRAVENOUS PRN
Status: ACTIVE | OUTPATIENT
Start: 2019-08-26 | End: 2019-08-26

## 2019-08-26 RX ORDER — ALBUTEROL SULFATE 90 UG/1
2 AEROSOL, METERED RESPIRATORY (INHALATION) PRN
Status: ACTIVE | OUTPATIENT
Start: 2019-08-26 | End: 2019-08-26

## 2019-08-26 RX ORDER — CYANOCOBALAMIN 1000 UG/ML
1000 INJECTION INTRAMUSCULAR; SUBCUTANEOUS
Status: DISCONTINUED | OUTPATIENT
Start: 2019-08-27 | End: 2019-09-11 | Stop reason: HOSPADM

## 2019-08-26 RX ORDER — SODIUM CHLORIDE 9 MG/ML
INJECTION, SOLUTION INTRAVENOUS CONTINUOUS
Status: DISCONTINUED | OUTPATIENT
Start: 2019-08-26 | End: 2019-08-28

## 2019-08-26 RX ORDER — SODIUM CHLORIDE 0.9 % (FLUSH) 0.9 %
10 SYRINGE (ML) INJECTION PRN
Status: ACTIVE | OUTPATIENT
Start: 2019-08-26 | End: 2019-08-26

## 2019-08-26 RX ORDER — SODIUM CHLORIDE 9 MG/ML
500 INJECTION, SOLUTION INTRAVENOUS CONTINUOUS PRN
Status: ACTIVE | OUTPATIENT
Start: 2019-08-26 | End: 2019-08-26

## 2019-08-26 RX ORDER — CALCITRIOL 0.25 UG/1
0.25 CAPSULE, LIQUID FILLED ORAL DAILY
Status: DISCONTINUED | OUTPATIENT
Start: 2019-08-27 | End: 2019-09-11 | Stop reason: HOSPADM

## 2019-08-26 RX ADMIN — CARBIDOPA AND LEVODOPA 1 TABLET: 25; 100 TABLET, EXTENDED RELEASE ORAL at 11:04

## 2019-08-26 RX ADMIN — ASPIRIN 81 MG: 81 TABLET, COATED ORAL at 11:02

## 2019-08-26 RX ADMIN — TETRAKIS(2-METHOXYISOBUTYLISOCYANIDE)COPPER(I) TETRAFLUOROBORATE 17.2 MILLICURIE: 1 INJECTION, POWDER, LYOPHILIZED, FOR SOLUTION INTRAVENOUS at 08:33

## 2019-08-26 RX ADMIN — CYCLOBENZAPRINE HYDROCHLORIDE 5 MG: 5 TABLET, FILM COATED ORAL at 20:19

## 2019-08-26 RX ADMIN — ATORVASTATIN CALCIUM 20 MG: 20 TABLET, FILM COATED ORAL at 20:19

## 2019-08-26 RX ADMIN — SODIUM CHLORIDE: 9 INJECTION, SOLUTION INTRAVENOUS at 11:18

## 2019-08-26 RX ADMIN — ROPINIROLE HYDROCHLORIDE 2 MG: 2 TABLET, FILM COATED ORAL at 14:29

## 2019-08-26 RX ADMIN — REGADENOSON 0.4 MG: 0.08 INJECTION, SOLUTION INTRAVENOUS at 08:32

## 2019-08-26 RX ADMIN — Medication 10 ML: at 08:32

## 2019-08-26 RX ADMIN — INSULIN LISPRO 1 UNITS: 100 INJECTION, SOLUTION INTRAVENOUS; SUBCUTANEOUS at 14:27

## 2019-08-26 RX ADMIN — IPRATROPIUM BROMIDE AND ALBUTEROL SULFATE 3 ML: .5; 3 SOLUTION RESPIRATORY (INHALATION) at 15:48

## 2019-08-26 RX ADMIN — TETRAKIS(2-METHOXYISOBUTYLISOCYANIDE)COPPER(I) TETRAFLUOROBORATE 38.8 MILLICURIE: 1 INJECTION, POWDER, LYOPHILIZED, FOR SOLUTION INTRAVENOUS at 13:00

## 2019-08-26 RX ADMIN — FUROSEMIDE 20 MG: 20 TABLET ORAL at 11:05

## 2019-08-26 RX ADMIN — HEPARIN SODIUM 5000 UNITS: 5000 INJECTION INTRAVENOUS; SUBCUTANEOUS at 14:27

## 2019-08-26 RX ADMIN — RASAGILINE 1 MG: 1 TABLET ORAL at 11:02

## 2019-08-26 RX ADMIN — Medication 2 PUFF: at 05:46

## 2019-08-26 RX ADMIN — AMIODARONE HYDROCHLORIDE 200 MG: 200 TABLET ORAL at 11:06

## 2019-08-26 RX ADMIN — ROPINIROLE HYDROCHLORIDE 2 MG: 2 TABLET, FILM COATED ORAL at 11:05

## 2019-08-26 RX ADMIN — Medication 5 MG: at 21:05

## 2019-08-26 RX ADMIN — Medication 2 PUFF: at 19:17

## 2019-08-26 RX ADMIN — CARBIDOPA AND LEVODOPA 1 TABLET: 25; 100 TABLET, EXTENDED RELEASE ORAL at 20:25

## 2019-08-26 RX ADMIN — ROPINIROLE HYDROCHLORIDE 2 MG: 2 TABLET, FILM COATED ORAL at 20:23

## 2019-08-26 RX ADMIN — HEPARIN SODIUM 5000 UNITS: 5000 INJECTION INTRAVENOUS; SUBCUTANEOUS at 05:42

## 2019-08-26 RX ADMIN — IPRATROPIUM BROMIDE AND ALBUTEROL SULFATE 3 ML: .5; 3 SOLUTION RESPIRATORY (INHALATION) at 05:46

## 2019-08-26 RX ADMIN — IPRATROPIUM BROMIDE AND ALBUTEROL SULFATE 3 ML: .5; 3 SOLUTION RESPIRATORY (INHALATION) at 19:16

## 2019-08-26 RX ADMIN — CARBIDOPA AND LEVODOPA 1 TABLET: 25; 100 TABLET, EXTENDED RELEASE ORAL at 14:29

## 2019-08-26 RX ADMIN — IPRATROPIUM BROMIDE AND ALBUTEROL SULFATE 3 ML: .5; 3 SOLUTION RESPIRATORY (INHALATION) at 10:03

## 2019-08-26 RX ADMIN — SENNOSIDES 17.2 MG: 8.6 TABLET, FILM COATED ORAL at 20:21

## 2019-08-26 RX ADMIN — LINAGLIPTIN 2.5 MG: 5 TABLET, FILM COATED ORAL at 11:03

## 2019-08-26 ASSESSMENT — PAIN SCALES - GENERAL: PAINLEVEL_OUTOF10: 0

## 2019-08-26 ASSESSMENT — ENCOUNTER SYMPTOMS: SHORTNESS OF BREATH: 0

## 2019-08-26 NOTE — CONSULTS
Consultation Note  Podiatric Medicine and Surgery     Subjective     Chief Complaint: Plantar warts    HPI:  Cristela Montoya is a 69 y.o. female seen at [address] obs for plantar warts. Patient was admitted to the Obs unit for chest pain. Patient states she had them on both feet for many years. She denies any recent changes to the size. She states she used to see a podiatrist a few years ago, but cannot recall which one. She admits to symptoms of intermittent claudication. She has no other pedal complaints. PCP is Sherry Tompkins MD    ROS:   Review of Systems   Constitutional: Negative for chills and fever. Respiratory: Negative for shortness of breath. Cardiovascular: Positive for chest pain. Negative for leg swelling. Skin: Positive for pallor (Dependent rubor). Negative for wound. Neurological: Negative for facial asymmetry and speech difficulty. Psychiatric/Behavioral: Negative for agitation and confusion. The patient is not nervous/anxious.         Past Medical History   has a past medical history of Acute on chronic diastolic congestive heart failure ([address] Anesthesia complication, Asthma, Atrial fibrillation ([address] Cataracts, bilateral, Cellulitis, Cerebral artery occlusion with cerebral infarction ([address] CHF (congestive heart failure) ([address] Chronic kidney disease, Chronic kidney disease, Closed fracture of proximal end of right humerus with routine healing, Constipation, COPD (chronic obstructive pulmonary disease) ([address] Difficult intravenous access, Difficulty walking, Diverticulosis, DM2 (diabetes mellitus, type 2) ([address] Full dentures, GERD (gastroesophageal reflux disease), H/O fall, Headache(784.0), [place] (hard of hearing), Hyperlipidemia, Hyperplastic polyp of intestine, Hypertension, Impaired ambulation, Kidney stone, Living in nursing home, Morbid obesity with BMI of 40.0-44.9, adult ([address] Muscle weakness, Open wound, ECTOR on CPAP, Osteoarthritis, Parkinson disease ([address] Restless leg tablet Take 1 tablet by mouth 2 times daily 9/7/18   Sade Marcos, DO   vitamin D (CHOLECALCIFEROL) 5000 units CAPS capsule Take 5,000 Units by mouth daily    Historical Provider, MD   Oxygen Concentrator Dx: Pneumonia, use as directed. Patient taking differently: Dx: Pneumonia, use as directed. ON 24/7 2/10/17   [place] DO    Scheduled Meds:   aspirin  324 mg Oral Once    sodium chloride flush  10 mL Intravenous 2 times per day    amiodarone  200 mg Oral Daily    atorvastatin  20 mg Oral Daily    mometasone-formoterol  2 puff Inhalation BID    carbidopa-levodopa  1 tablet Oral TID    furosemide  20 mg Oral Daily    ipratropium-albuterol  3 mL Inhalation 4x daily    linagliptin  2.5 mg Oral Daily    senna  2 tablet Oral Nightly    rOPINIRole  2 mg Oral TID    insulin lispro  0-6 Units Subcutaneous TID WC    insulin lispro  0-3 Units Subcutaneous Nightly    heparin (porcine)  5,000 Units Subcutaneous 3 times per day    aspirin  81 mg Oral Daily    pantoprazole  40 mg Oral BID AC    rasagiline mesylate  1 mg Oral Daily     Continuous Infusions:   sodium chloride      dextrose       PRN Meds:.sodium chloride flush, sodium chloride, albuterol sulfate HFA, aminophylline, technetium sestamibi, sodium chloride flush, glucose, dextrose, glucagon (rDNA), dextrose, albuterol, Melatonin, cyclobenzaprine    Allergies  is allergic to dye [barium-containing compounds]; pcn [penicillins]; and bactrim [sulfamethoxazole-trimethoprim]. Family History  family history includes Heart Disease in her mother; Heart Failure in her mother; High Blood Pressure in her mother; Hypertension in her mother. Social History   reports that she quit smoking about 48 years ago. Her smoking use included cigarettes. She has a 30.00 pack-year smoking history. She has never used smokeless tobacco.   reports that she drinks about 2.0 standard drinks of alcohol per week.    reports that she does not use

## 2019-08-26 NOTE — PLAN OF CARE
Problem: Falls - Risk of:  Goal: Will remain free from falls  Description  Will remain free from falls  8/26/2019 1415 by Yeni Cruz RN  Outcome: Ongoing     Problem: Falls - Risk of:  Goal: Absence of physical injury  Description  Absence of physical injury  Outcome: Ongoing     Problem: Pain:  Goal: Pain level will decrease  Description  Pain level will decrease  Outcome: Ongoing     Problem: Pain:  Goal: Control of acute pain  Description  Control of acute pain  Outcome: Ongoing     Problem: Breathing Pattern - Ineffective:  Goal: Ability to achieve and maintain a regular respiratory rate will improve  Description  Ability to achieve and maintain a regular respiratory rate will improve  Outcome: Ongoing

## 2019-08-26 NOTE — PROGRESS NOTES
Pt tolerated lexiscan with mild side effect, recovered on cart and taken to [facility] for further testing.

## 2019-08-26 NOTE — CONSULTS
chloride flush  10 mL Intravenous 2 times per day    amiodarone  200 mg Oral Daily    atorvastatin  20 mg Oral Daily    mometasone-formoterol  2 puff Inhalation BID    carbidopa-levodopa  1 tablet Oral TID    furosemide  20 mg Oral Daily    ipratropium-albuterol  3 mL Inhalation 4x daily    linagliptin  2.5 mg Oral Daily    senna  2 tablet Oral Nightly    rOPINIRole  2 mg Oral TID    insulin lispro  0-6 Units Subcutaneous TID WC    insulin lispro  0-3 Units Subcutaneous Nightly    heparin (porcine)  5,000 Units Subcutaneous 3 times per day    aspirin  81 mg Oral Daily    pantoprazole  40 mg Oral BID AC    rasagiline mesylate  1 mg Oral Daily     Continuous Infusions:   sodium chloride 50 mL/hr at 08/26/19 1118    dextrose        Outpatient Medications Marked as Taking for the 8/25/19 encounter [facility] Encounter)   Medication Sig Dispense Refill    sulfamethoxazole-trimethoprim (BACTRIM DS) 800-160 MG per tablet Take 1 tablet by mouth 2 times daily      melatonin 5 MG TABS tablet Take 5 mg by mouth nightly as needed (sleep)      Cyanocobalamin 5000 MCG TBDP Take 5,000 mcg by mouth daily      calcitRIOL (ROCALTROL) 0.25 MCG capsule Take 0.25 mcg by mouth daily      furosemide (LASIX) 20 MG tablet Take 1 tablet by mouth daily 60 tablet 3    atorvastatin (LIPITOR) 20 MG tablet TAKE 1 TABLET DAILY 90 tablet 2    carbidopa-levodopa (SINEMET CR)  MG per extended release tablet Take 1 tablet by mouth 4 times daily (Patient taking differently: Take 1 tablet by mouth 3 times daily ) 360 tablet 3    rOPINIRole (REQUIP) 2 MG tablet Take 1 tablet by mouth 3 times daily 270 tablet 3    pantoprazole (PROTONIX) 40 MG tablet TAKE 1 TABLET TWICE A DAY BEFORE MEALS 180 tablet 3    linagliptin (TRADJENTA) 5 MG tablet Take 0.5 tablets by mouth daily 90 tablet 1    amiodarone (CORDARONE) 200 MG tablet Take 200 mg by mouth daily       senna (SENOKOT) 8.6 MG tablet Take 2 tablets by mouth nightly      

## 2019-08-26 NOTE — PROGRESS NOTES
slightly worse than her based line, she notes occasional wheeze. She has occasional cough without significant sputum production. No chest pain at this time. She is sitting up in a chair, finished breakfast, no acute distress. PMHx   Past Medical History      Diagnosis Date    Acute on chronic diastolic congestive heart failure ([facility] [address]     Anesthesia complication     DIFFICULTY WAKING OP    Asthma     Atrial fibrillation ([facility] [address] 2013    Cataracts, bilateral     Cellulitis     BILAT LOWER LEGS-PT STATES IS IMPROVING    Cerebral artery occlusion with cerebral infarction [facility])     Last stroke was February 2017 w/no deficits-TOTAL OF 3    CHF (congestive heart failure) (HCC)     Chronic kidney disease 2013    NOT ON DIALYSIS YET    Chronic kidney disease     Closed fracture of proximal end of right humerus with routine healing     Constipation     CHRONIC    COPD (chronic obstructive pulmonary disease) ([facility] [address] 2008    PT. SEES DR. BECK. Home O2 at 2-3L/NC 24/7.     Difficult intravenous access     VEINS ROLL    Difficulty walking     AMBULATES WITH THERAPPY    Diverticulosis     DM2 (diabetes mellitus, type 2) ([facility] [address] 2008    Full dentures     FULL UPPER ONLY    GERD (gastroesophageal reflux disease) 2008    ON RX    H/O fall     Headache(784.0)     [place] (hard of hearing)     HAS HEARING AIDS BUT DOES NOT WEAR    Hyperlipidemia     Hyperplastic polyp of intestine     Hypertension 2008    Impaired ambulation     USES TRANFER CHAIR WALKER OR CANE    Kidney stone 2018    PRESENTLY-SMALL    Living in nursing home     [address]    Morbid obesity with BMI of 40.0-44.9, adult ([facility] [address] 12/30/2016    Muscle weakness     Open wound     COCCYX    ECTOR on CPAP 2008    USES C-PAP NIGHTLY    Osteoarthritis     OSTEOARTHRITIS    Parkinson disease ([facility] [address] 2015    Restless leg syndrome 2013    MILD    Spinal stenosis in cervical region 6/2/2013    Type II or BMI 34.37 kg/m²   Body mass index is 34.37 kg/m². I/O        Intake/Output Summary (Last 24 hours) at 8/26/2019 1028  Last data filed at 8/25/2019 1822  Gross per 24 hour   Intake 300 ml   Output --   Net 300 ml     I/O last 3 completed shifts: In: 300 [P.O.:300]  Out: -    Patient Vitals for the past 96 hrs (Last 3 readings):   Weight   08/25/19 1023 176 lb (79.8 kg)     Exam   General Appearance Awake, alert, oriented, in no acute distress, sitting up in chair no distress  HEENT - Head is normocephalic, atraumatic. Pupil reactive to light  Neck - Supple, symmetrical, trachea midline and Soft, trachea midline and straight  Lungs - + crackles  Cardiovascular - Heart sounds are normal.  Regular rhythm normal rate without murmur, gallop or rub. Abdomen - Soft, nontender, nondistended, no masses or organomegaly  Neurologic - CN II-XII are grossly intact.  There are no focal motor or sensory deficits  Skin - No bruising or bleeding  Extremities - No cyanosis, clubbing or edema    Labs  - Old records and notes have been reviewed in [facility]   CBC     Lab Results   Component Value Date    WBC 7.7 08/26/2019    RBC 3.10 08/26/2019    HGB 9.8 08/26/2019    HCT 32.9 08/26/2019     08/26/2019    .1 08/26/2019    MCH 31.6 08/26/2019    MCHC 29.8 08/26/2019    RDW 15.7 08/26/2019    NRBC 1 06/11/2018    METASPCT 1 06/10/2018    LYMPHOPCT 23 08/26/2019    MONOPCT 9 08/26/2019    BASOPCT 0 08/26/2019    MONOSABS 0.71 08/26/2019    LYMPHSABS 1.77 08/26/2019    EOSABS 0.13 08/26/2019    BASOSABS 0.03 08/26/2019    DIFFTYPE NOT REPORTED 08/26/2019     BMP   Lab Results   Component Value Date     08/26/2019    K 5.1 08/26/2019    CL 97 08/26/2019    CO2 31 08/26/2019    BUN 42 08/26/2019    CREATININE 2.40 08/26/2019    GLUCOSE 108 08/26/2019    GLUCOSE 132 03/07/2012    CALCIUM 9.1 08/26/2019    MG 2.1 06/04/2019     LFTS  Lab Results   Component Value Date    ALKPHOS 67 06/05/2019    ALT <5 06/05/2019    AST 17 06/05/2019    PROT 6.3 06/05/2019    BILITOT 0.43 06/05/2019    BILIDIR 0.14 06/04/2019    IBILI 0.38 06/04/2019    LABALBU 3.2 06/05/2019    LABALBU Detected 11/30/2018     PTT  Lab Results   Component Value Date    APTT 26.5 08/25/2019     INR   Lab Results   Component Value Date    INR 1.0 08/25/2019    INR 1.0 06/05/2019    INR 1.0 05/27/2019    PROTIME 10.0 08/25/2019    PROTIME 10.5 06/05/2019    PROTIME 10.1 05/27/2019       Radiology    CXR   8/26/19      (See actual reports for details)    \"Thank you for asking us to see this patient\"    Case discussed with nurse and patient. Questions and concerns addressed.     Electronically signed by     ANSHUL Lugo CNP on 8/26/2019 at 10:28 AM  Patient was seen under the supervision of Dr. Jazmyne Caro and Sleep Medicine,    [facility] AT [place]: [phone]

## 2019-08-27 PROBLEM — N17.9 ACUTE KIDNEY INJURY (HCC): Status: ACTIVE | Noted: 2019-08-27

## 2019-08-27 LAB
ABSOLUTE EOS #: 0.09 K/UL (ref 0–0.44)
ABSOLUTE IMMATURE GRANULOCYTE: 0.04 K/UL (ref 0–0.3)
ABSOLUTE LYMPH #: 1.22 K/UL (ref 1.1–3.7)
ABSOLUTE MONO #: 0.65 K/UL (ref 0.1–1.2)
ANION GAP SERPL CALCULATED.3IONS-SCNC: 10 MMOL/L (ref 9–17)
BASOPHILS # BLD: 1 % (ref 0–2)
BASOPHILS ABSOLUTE: 0.04 K/UL (ref 0–0.2)
BUN BLDV-MCNC: 50 MG/DL (ref 8–23)
BUN/CREAT BLD: 17 (ref 9–20)
CALCIUM SERPL-MCNC: 8.5 MG/DL (ref 8.6–10.4)
CHLORIDE BLD-SCNC: 100 MMOL/L (ref 98–107)
CO2: 31 MMOL/L (ref 20–31)
CREAT SERPL-MCNC: 2.89 MG/DL (ref 0.5–0.9)
CREATININE URINE: 51.7 MG/DL (ref 28–217)
CULTURE: NORMAL
DIFFERENTIAL TYPE: ABNORMAL
EOSINOPHIL,URINE: NORMAL
EOSINOPHILS RELATIVE PERCENT: 2 % (ref 1–4)
FERRITIN: 253 UG/L (ref 13–150)
GFR AFRICAN AMERICAN: 19 ML/MIN
GFR NON-AFRICAN AMERICAN: 16 ML/MIN
GFR SERPL CREATININE-BSD FRML MDRD: ABNORMAL ML/MIN/{1.73_M2}
GFR SERPL CREATININE-BSD FRML MDRD: ABNORMAL ML/MIN/{1.73_M2}
GLUCOSE BLD-MCNC: 100 MG/DL (ref 65–105)
GLUCOSE BLD-MCNC: 103 MG/DL (ref 70–99)
GLUCOSE BLD-MCNC: 122 MG/DL (ref 65–105)
GLUCOSE BLD-MCNC: 201 MG/DL (ref 65–105)
GLUCOSE BLD-MCNC: 90 MG/DL (ref 65–105)
HCT VFR BLD CALC: 30.4 % (ref 36.3–47.1)
HEMOGLOBIN: 9.1 G/DL (ref 11.9–15.1)
IMMATURE GRANULOCYTES: 1 %
IRON SATURATION: 39 % (ref 20–55)
IRON: 82 UG/DL (ref 37–145)
LYMPHOCYTES # BLD: 21 % (ref 24–43)
Lab: NORMAL
MCH RBC QN AUTO: 31.8 PG (ref 25.2–33.5)
MCHC RBC AUTO-ENTMCNC: 29.9 G/DL (ref 28.4–34.8)
MCV RBC AUTO: 106.3 FL (ref 82.6–102.9)
MONOCYTES # BLD: 11 % (ref 3–12)
NRBC AUTOMATED: 0 PER 100 WBC
PDW BLD-RTO: 15.6 % (ref 11.8–14.4)
PLATELET # BLD: 100 K/UL (ref 138–453)
PLATELET ESTIMATE: ABNORMAL
PMV BLD AUTO: 11 FL (ref 8.1–13.5)
POTASSIUM SERPL-SCNC: 5.1 MMOL/L (ref 3.7–5.3)
RBC # BLD: 2.86 M/UL (ref 3.95–5.11)
RBC # BLD: ABNORMAL 10*6/UL
SEG NEUTROPHILS: 65 % (ref 36–65)
SEGMENTED NEUTROPHILS ABSOLUTE COUNT: 3.73 K/UL (ref 1.5–8.1)
SODIUM BLD-SCNC: 141 MMOL/L (ref 135–144)
SODIUM,UR: 127 MMOL/L
SPECIMEN DESCRIPTION: NORMAL
TOTAL IRON BINDING CAPACITY: 211 UG/DL (ref 250–450)
UNSATURATED IRON BINDING CAPACITY: 129 UG/DL (ref 112–347)
WBC # BLD: 5.8 K/UL (ref 3.5–11.3)
WBC # BLD: ABNORMAL 10*3/UL

## 2019-08-27 PROCEDURE — 84300 ASSAY OF URINE SODIUM: CPT

## 2019-08-27 PROCEDURE — 94640 AIRWAY INHALATION TREATMENT: CPT

## 2019-08-27 PROCEDURE — 82947 ASSAY GLUCOSE BLOOD QUANT: CPT

## 2019-08-27 PROCEDURE — 83550 IRON BINDING TEST: CPT

## 2019-08-27 PROCEDURE — 6360000002 HC RX W HCPCS: Performed by: INTERNAL MEDICINE

## 2019-08-27 PROCEDURE — 6370000000 HC RX 637 (ALT 250 FOR IP): Performed by: INTERNAL MEDICINE

## 2019-08-27 PROCEDURE — 6360000002 HC RX W HCPCS: Performed by: NURSE PRACTITIONER

## 2019-08-27 PROCEDURE — 87205 SMEAR GRAM STAIN: CPT

## 2019-08-27 PROCEDURE — 96372 THER/PROPH/DIAG INJ SC/IM: CPT

## 2019-08-27 PROCEDURE — 2060000000 HC ICU INTERMEDIATE R&B

## 2019-08-27 PROCEDURE — 82570 ASSAY OF URINE CREATININE: CPT

## 2019-08-27 PROCEDURE — 6370000000 HC RX 637 (ALT 250 FOR IP): Performed by: NURSE PRACTITIONER

## 2019-08-27 PROCEDURE — 36415 COLL VENOUS BLD VENIPUNCTURE: CPT

## 2019-08-27 PROCEDURE — 85025 COMPLETE CBC W/AUTO DIFF WBC: CPT

## 2019-08-27 PROCEDURE — 82728 ASSAY OF FERRITIN: CPT

## 2019-08-27 PROCEDURE — 2580000003 HC RX 258: Performed by: INTERNAL MEDICINE

## 2019-08-27 PROCEDURE — 83540 ASSAY OF IRON: CPT

## 2019-08-27 PROCEDURE — 80048 BASIC METABOLIC PNL TOTAL CA: CPT

## 2019-08-27 RX ORDER — ACETAMINOPHEN 325 MG/1
650 TABLET ORAL EVERY 4 HOURS PRN
Status: DISCONTINUED | OUTPATIENT
Start: 2019-08-27 | End: 2019-08-30 | Stop reason: SDUPTHER

## 2019-08-27 RX ORDER — ACETYLCYSTEINE 200 MG/ML
1200 SOLUTION ORAL; RESPIRATORY (INHALATION) 2 TIMES DAILY
Status: COMPLETED | OUTPATIENT
Start: 2019-08-28 | End: 2019-08-29

## 2019-08-27 RX ORDER — IPRATROPIUM BROMIDE AND ALBUTEROL SULFATE 2.5; .5 MG/3ML; MG/3ML
3 SOLUTION RESPIRATORY (INHALATION) 3 TIMES DAILY
Status: DISCONTINUED | OUTPATIENT
Start: 2019-08-27 | End: 2019-09-11 | Stop reason: HOSPADM

## 2019-08-27 RX ADMIN — PANTOPRAZOLE SODIUM 40 MG: 40 TABLET, DELAYED RELEASE ORAL at 09:36

## 2019-08-27 RX ADMIN — LINAGLIPTIN 2.5 MG: 5 TABLET, FILM COATED ORAL at 09:35

## 2019-08-27 RX ADMIN — SODIUM CHLORIDE, PRESERVATIVE FREE 10 ML: 5 INJECTION INTRAVENOUS at 00:42

## 2019-08-27 RX ADMIN — AMIODARONE HYDROCHLORIDE 200 MG: 200 TABLET ORAL at 09:35

## 2019-08-27 RX ADMIN — IPRATROPIUM BROMIDE AND ALBUTEROL SULFATE 3 ML: .5; 3 SOLUTION RESPIRATORY (INHALATION) at 14:39

## 2019-08-27 RX ADMIN — ROPINIROLE HYDROCHLORIDE 2 MG: 2 TABLET, FILM COATED ORAL at 09:35

## 2019-08-27 RX ADMIN — HEPARIN SODIUM 5000 UNITS: 5000 INJECTION INTRAVENOUS; SUBCUTANEOUS at 05:25

## 2019-08-27 RX ADMIN — ASPIRIN 81 MG: 81 TABLET, COATED ORAL at 09:35

## 2019-08-27 RX ADMIN — ACETAMINOPHEN 650 MG: 325 TABLET ORAL at 13:23

## 2019-08-27 RX ADMIN — RASAGILINE 1 MG: 1 TABLET ORAL at 09:35

## 2019-08-27 RX ADMIN — DARBEPOETIN ALFA 60 MCG: 60 INJECTION, SOLUTION INTRAVENOUS; SUBCUTANEOUS at 13:28

## 2019-08-27 RX ADMIN — CARBIDOPA AND LEVODOPA 1 TABLET: 25; 100 TABLET, EXTENDED RELEASE ORAL at 13:29

## 2019-08-27 RX ADMIN — Medication 5 MG: at 22:07

## 2019-08-27 RX ADMIN — HEPARIN SODIUM 5000 UNITS: 5000 INJECTION INTRAVENOUS; SUBCUTANEOUS at 13:23

## 2019-08-27 RX ADMIN — CARBIDOPA AND LEVODOPA 1 TABLET: 25; 100 TABLET, EXTENDED RELEASE ORAL at 09:35

## 2019-08-27 RX ADMIN — METOPROLOL TARTRATE 12.5 MG: 25 TABLET, FILM COATED ORAL at 21:14

## 2019-08-27 RX ADMIN — INSULIN LISPRO 2 UNITS: 100 INJECTION, SOLUTION INTRAVENOUS; SUBCUTANEOUS at 13:24

## 2019-08-27 RX ADMIN — FUROSEMIDE 20 MG: 20 TABLET ORAL at 09:35

## 2019-08-27 RX ADMIN — IPRATROPIUM BROMIDE AND ALBUTEROL SULFATE 3 ML: .5; 3 SOLUTION RESPIRATORY (INHALATION) at 09:43

## 2019-08-27 RX ADMIN — ATORVASTATIN CALCIUM 20 MG: 20 TABLET, FILM COATED ORAL at 21:13

## 2019-08-27 RX ADMIN — CARBIDOPA AND LEVODOPA 1 TABLET: 25; 100 TABLET, EXTENDED RELEASE ORAL at 21:12

## 2019-08-27 RX ADMIN — METOPROLOL TARTRATE 12.5 MG: 25 TABLET, FILM COATED ORAL at 09:43

## 2019-08-27 RX ADMIN — ACETAMINOPHEN 650 MG: 325 TABLET ORAL at 19:31

## 2019-08-27 RX ADMIN — SENNOSIDES 17.2 MG: 8.6 TABLET, FILM COATED ORAL at 21:11

## 2019-08-27 RX ADMIN — CYANOCOBALAMIN 1000 MCG: 1000 INJECTION, SOLUTION INTRAMUSCULAR; SUBCUTANEOUS at 09:42

## 2019-08-27 RX ADMIN — ROPINIROLE HYDROCHLORIDE 2 MG: 2 TABLET, FILM COATED ORAL at 13:29

## 2019-08-27 RX ADMIN — CYCLOBENZAPRINE HYDROCHLORIDE 5 MG: 5 TABLET, FILM COATED ORAL at 22:07

## 2019-08-27 RX ADMIN — HEPARIN SODIUM 5000 UNITS: 5000 INJECTION INTRAVENOUS; SUBCUTANEOUS at 21:16

## 2019-08-27 RX ADMIN — Medication 2 PUFF: at 21:08

## 2019-08-27 RX ADMIN — PANTOPRAZOLE SODIUM 40 MG: 40 TABLET, DELAYED RELEASE ORAL at 17:46

## 2019-08-27 RX ADMIN — CALCITRIOL 0.25 MCG: 0.25 CAPSULE ORAL at 09:36

## 2019-08-27 RX ADMIN — HEPARIN SODIUM 5000 UNITS: 5000 INJECTION INTRAVENOUS; SUBCUTANEOUS at 00:39

## 2019-08-27 RX ADMIN — ROPINIROLE HYDROCHLORIDE 2 MG: 2 TABLET, FILM COATED ORAL at 21:12

## 2019-08-27 RX ADMIN — Medication 2 PUFF: at 09:44

## 2019-08-27 RX ADMIN — IPRATROPIUM BROMIDE AND ALBUTEROL SULFATE 3 ML: .5; 3 SOLUTION RESPIRATORY (INHALATION) at 21:08

## 2019-08-27 ASSESSMENT — PAIN SCALES - GENERAL
PAINLEVEL_OUTOF10: 3
PAINLEVEL_OUTOF10: 3

## 2019-08-27 ASSESSMENT — PAIN DESCRIPTION - PAIN TYPE: TYPE: ACUTE PAIN

## 2019-08-27 ASSESSMENT — PAIN DESCRIPTION - LOCATION: LOCATION: HEAD

## 2019-08-27 NOTE — PROGRESS NOTES
Pulmonary Critical Care Progress Note  Kary Becker CNP / Dr. Edwina Peña M.D. Patient seen for the follow up of chest pain, respiratory failure, ECTOR  Subjective:  She is sitting up in chair in no distress. No acute events noted overnight. She has mild shortness of breath, notes an occasional wheeze. She has occasional dry cough. No significant complaints of chest pain. Examination:  Vitals: BP (!) 141/79   Pulse 90   Temp 97.5 °F (36.4 °C) (Oral)   Resp 18   Ht 5' (1.524 m)   Wt 191 lb 9.6 oz (86.9 kg)   LMP 04/03/2004 (Within Years)   SpO2 98%   BMI 37.42 kg/m²     General appearance: alert and cooperative with exam, up in chair no distress  Neck: No JVD  Lungs: Decreased air exchange with no wheeze  Heart: regular rate and rhythm, S1, S2 normal, no gallop  Abdomen: Soft, non tender, + BS  Extremities: no cyanosis or clubbing. No significant edema    LABs:  CBC:   Recent Labs     08/26/19  0626 08/27/19  0434   WBC 7.7 5.8   HGB 9.8* 9.1*   HCT 32.9* 30.4*   * 100*     BMP:   Recent Labs     08/26/19  0626 08/27/19  0434    141   K 5.1 5.1   CO2 31 31   BUN 42* 50*   CREATININE 2.40* 2.89*   LABGLOM 20* 16*   GLUCOSE 108* 103*     PT/INR:   Recent Labs     08/25/19  1040   PROTIME 10.0   INR 1.0     APTT:  Recent Labs     08/25/19  1040   APTT 26.5     Radiology:        Impression:  · Chest pain/abnormal stress test  · Chronic respiratory failure  · Chronic diastolic heart failure  · Atelectasis  · COPD/30-pack-year smoking history  · KRISTIN on CKD  · A.  Fib  · Obstructive sleep apnea/obesity    Recommendations:  · 2 liters/min via nasal cannula  · BiPAP for sleep and as needed   · Albuterol and Ipratropium Q 8 hours and prn  · Dulera 200  · IVF, monitor renal function   · Abnormal stress test, cardiology considering cardiac cath  · Nephrology has been consulted due to acute kidney injury  · Labs: CBC and BMP in am  · DVT prophylaxis with low molecular weight heparin  · Protonix

## 2019-08-27 NOTE — PROGRESS NOTES
Melony Bryan, PPatient Assessment complete. Chest pain [R07.9] . Vitals:    08/27/19 0513   BP: (!) 141/60   Pulse: 86   Resp: 20   Temp: 97.7 °F (36.5 °C)   SpO2: 98%   . Patients home meds are   Prior to Admission medications    Medication Sig Start Date End Date Taking?  Authorizing Provider   darbepoetin albaro-polysorbate (ARANESP) 200 MCG/0.4ML SOSY injection Inject 200 mcg into the skin every 28 days   Yes Historical Provider, MD   cyanocobalamin 1000 MCG/ML injection Inject 1,000 mcg into the muscle every 30 days   Yes Historical Provider, MD   sulfamethoxazole-trimethoprim (BACTRIM DS) 800-160 MG per tablet Take 1 tablet by mouth 2 times daily   Yes Historical Provider, MD   melatonin 5 MG TABS tablet Take 5 mg by mouth nightly as needed (sleep)   Yes Historical Provider, MD   Cyanocobalamin 5000 MCG TBDP Take 5,000 mcg by mouth daily   Yes Historical Provider, MD   calcitRIOL (ROCALTROL) 0.25 MCG capsule Take 0.25 mcg by mouth daily   Yes Historical Provider, MD   furosemide (LASIX) 20 MG tablet Take 1 tablet by mouth daily 5/30/19  Yes Baron Gallito MD   atorvastatin (LIPITOR) 20 MG tablet TAKE 1 TABLET DAILY 4/18/19  Yes Lesly Hill DO   carbidopa-levodopa (SINEMET CR)  MG per extended release tablet Take 1 tablet by mouth 4 times daily  Patient taking differently: Take 1 tablet by mouth 3 times daily  4/9/19  Yes Lesly Hill DO   rOPINIRole (REQUIP) 2 MG tablet Take 1 tablet by mouth 3 times daily 4/9/19  Yes Lesly Hill DO   pantoprazole (PROTONIX) 40 MG tablet TAKE 1 TABLET TWICE A DAY BEFORE MEALS 2/7/19  Yes Lesly Hill DO   linagliptin (TRADJENTA) 5 MG tablet Take 0.5 tablets by mouth daily 1/16/19  Yes Lesly Hill DO   amiodarone (CORDARONE) 200 MG tablet Take 200 mg by mouth daily    Yes Historical Provider, MD   senna (SENOKOT) 8.6 MG tablet Take 2 tablets by mouth nightly    Yes Historical Provider, MD   aspirin 81 MG tablet Take 81 mg by mouth daily    Yes Historical

## 2019-08-27 NOTE — PROGRESS NOTES
Medications:   Scheduled Meds:   ipratropium-albuterol  3 mL Inhalation TID    calcitRIOL  0.25 mcg Oral Daily    cyanocobalamin  1,000 mcg Intramuscular Q30 Days    aspirin  324 mg Oral Once    sodium chloride flush  10 mL Intravenous 2 times per day    amiodarone  200 mg Oral Daily    atorvastatin  20 mg Oral Daily    mometasone-formoterol  2 puff Inhalation BID    carbidopa-levodopa  1 tablet Oral TID    furosemide  20 mg Oral Daily    linagliptin  2.5 mg Oral Daily    senna  2 tablet Oral Nightly    rOPINIRole  2 mg Oral TID    insulin lispro  0-6 Units Subcutaneous TID WC    insulin lispro  0-3 Units Subcutaneous Nightly    heparin (porcine)  5,000 Units Subcutaneous 3 times per day    aspirin  81 mg Oral Daily    pantoprazole  40 mg Oral BID AC    rasagiline mesylate  1 mg Oral Daily     Continuous Infusions:   sodium chloride 50 mL/hr at 08/26/19 1118    dextrose         Assessment/ Plan :   Chest pressure-stress test positive for ischemia. Paroxysmal atrial fibrillation. Hypertension. History of anemia and GI bleed preventing her from being on chronic anticoagulation. Acute on chronic renal failure. DIscussed case with patient's . Will manage medically with her ARF. Will follow. Thank you very much for allowing us to participate in the care of this patient. Please call us with any questions.     Electronically signed by Cee Samuel MD on 8/27/2019 at 8:51 AM

## 2019-08-27 NOTE — PROGRESS NOTES
disease ([facility] [address]    Fever    Macrocytic anemia    Hypercalcemia    Monoclonal gammopathy of undetermined significance    Acute nonintractable headache    Anemia of chronic renal failure, stage 4 (severe) (HCC)    Traumatic closed displaced fracture of proximal end of right humerus, initial encounter    Urinary tract infection without hematuria    Metabolic encephalopathy    Chest pain     Atypical chest pain  Abnormal stress test with reversible ischemia we will switch to inpatient may need cardiac cath  Chronic respiratory failure with hypoxia  Chronic diastolic CHF  Paroxysmal atrial fibrillation  Type 2 diabetes with renal complications  CKD 4 avoid nephrotoxic drugs will ask nephrology to see for possibility of cardiac cath  Obesity BMI of 30-34.9  Plan:    Meds labs reviewed continue with before meals and at bedtime Accu-Cheks insulin coverage as needed avoid nephrotoxic drugs we will have nephrology see the patient patient is probably going to need to have a cardiac cath in with her CKD 4 we will have them see her and possible need for Mucomyst and IV fluids see orders      Sushma Kothari MD  8:14 PM

## 2019-08-27 NOTE — FLOWSHEET NOTE
Writer marianela. Patient sitting in bedside chair, crocheting. There are no visitors.'    Patient is receptive, and engages in conversation with writer. She shares about her health and her hobbies. She expresses gratitude for her  Chevy Hartmann and her other family, and relates her strong mariama and trust in God. Writer offers support, presence, and prayer. Patient expresses thanks. Spiritual Care will follow as needed. 08/27/19 4348   Encounter Summary   Services provided to: Patient   Referral/Consult From: Marianela   Support System Spouse; Children   Continue Visiting   (8/27/19)   Complexity of Encounter Moderate   Length of Encounter 30 minutes   Spiritual Assessment Completed Yes   Routine   Type Initial   Spiritual/Confucianist   Type Spiritual support   Assessment Approachable;Coping   Intervention Active listening;Explored feelings, thoughts, concerns; Discussed illness/injury and it's impact; Discussed belief system/Yarsani practices/mariama;Prayer   Outcome Engaged in conversation;Expressed feelings/needs/concerns;Coping;Expressed gratitude

## 2019-08-28 LAB
ABSOLUTE EOS #: 0.15 K/UL (ref 0–0.44)
ABSOLUTE IMMATURE GRANULOCYTE: 0.06 K/UL (ref 0–0.3)
ABSOLUTE LYMPH #: 1.28 K/UL (ref 1.1–3.7)
ABSOLUTE MONO #: 0.62 K/UL (ref 0.1–1.2)
ANION GAP SERPL CALCULATED.3IONS-SCNC: 14 MMOL/L (ref 9–17)
BASOPHILS # BLD: 1 % (ref 0–2)
BASOPHILS ABSOLUTE: 0.04 K/UL (ref 0–0.2)
BUN BLDV-MCNC: 51 MG/DL (ref 8–23)
BUN/CREAT BLD: 21 (ref 9–20)
CALCIUM SERPL-MCNC: 8.8 MG/DL (ref 8.6–10.4)
CHLORIDE BLD-SCNC: 98 MMOL/L (ref 98–107)
CO2: 28 MMOL/L (ref 20–31)
CREAT SERPL-MCNC: 2.43 MG/DL (ref 0.5–0.9)
DIFFERENTIAL TYPE: ABNORMAL
EOSINOPHILS RELATIVE PERCENT: 2 % (ref 1–4)
GFR AFRICAN AMERICAN: 24 ML/MIN
GFR NON-AFRICAN AMERICAN: 20 ML/MIN
GFR SERPL CREATININE-BSD FRML MDRD: ABNORMAL ML/MIN/{1.73_M2}
GFR SERPL CREATININE-BSD FRML MDRD: ABNORMAL ML/MIN/{1.73_M2}
GLUCOSE BLD-MCNC: 106 MG/DL (ref 65–105)
GLUCOSE BLD-MCNC: 113 MG/DL (ref 70–99)
GLUCOSE BLD-MCNC: 132 MG/DL (ref 65–105)
GLUCOSE BLD-MCNC: 143 MG/DL (ref 65–105)
GLUCOSE BLD-MCNC: 144 MG/DL (ref 65–105)
HCT VFR BLD CALC: 34 % (ref 36.3–47.1)
HEMOGLOBIN: 10.2 G/DL (ref 11.9–15.1)
IMMATURE GRANULOCYTES: 1 %
LYMPHOCYTES # BLD: 17 % (ref 24–43)
MAGNESIUM: 1.7 MG/DL (ref 1.6–2.6)
MCH RBC QN AUTO: 32.1 PG (ref 25.2–33.5)
MCHC RBC AUTO-ENTMCNC: 30 G/DL (ref 28.4–34.8)
MCV RBC AUTO: 106.9 FL (ref 82.6–102.9)
MONOCYTES # BLD: 8 % (ref 3–12)
NRBC AUTOMATED: 0 PER 100 WBC
PDW BLD-RTO: 15.5 % (ref 11.8–14.4)
PHOSPHORUS: 5 MG/DL (ref 2.6–4.5)
PLATELET # BLD: 126 K/UL (ref 138–453)
PLATELET ESTIMATE: ABNORMAL
PMV BLD AUTO: 10.9 FL (ref 8.1–13.5)
POTASSIUM SERPL-SCNC: 4.9 MMOL/L (ref 3.7–5.3)
RBC # BLD: 3.18 M/UL (ref 3.95–5.11)
RBC # BLD: ABNORMAL 10*6/UL
SEG NEUTROPHILS: 71 % (ref 36–65)
SEGMENTED NEUTROPHILS ABSOLUTE COUNT: 5.51 K/UL (ref 1.5–8.1)
SODIUM BLD-SCNC: 140 MMOL/L (ref 135–144)
WBC # BLD: 7.7 K/UL (ref 3.5–11.3)
WBC # BLD: ABNORMAL 10*3/UL

## 2019-08-28 PROCEDURE — 97530 THERAPEUTIC ACTIVITIES: CPT

## 2019-08-28 PROCEDURE — 6360000002 HC RX W HCPCS: Performed by: INTERNAL MEDICINE

## 2019-08-28 PROCEDURE — 82947 ASSAY GLUCOSE BLOOD QUANT: CPT

## 2019-08-28 PROCEDURE — 36415 COLL VENOUS BLD VENIPUNCTURE: CPT

## 2019-08-28 PROCEDURE — 2580000003 HC RX 258: Performed by: INTERNAL MEDICINE

## 2019-08-28 PROCEDURE — 97116 GAIT TRAINING THERAPY: CPT

## 2019-08-28 PROCEDURE — 6360000002 HC RX W HCPCS: Performed by: NURSE PRACTITIONER

## 2019-08-28 PROCEDURE — 6370000000 HC RX 637 (ALT 250 FOR IP): Performed by: INTERNAL MEDICINE

## 2019-08-28 PROCEDURE — 94640 AIRWAY INHALATION TREATMENT: CPT

## 2019-08-28 PROCEDURE — 80048 BASIC METABOLIC PNL TOTAL CA: CPT

## 2019-08-28 PROCEDURE — 6370000000 HC RX 637 (ALT 250 FOR IP): Performed by: NURSE PRACTITIONER

## 2019-08-28 PROCEDURE — 93005 ELECTROCARDIOGRAM TRACING: CPT | Performed by: INTERNAL MEDICINE

## 2019-08-28 PROCEDURE — 97162 PT EVAL MOD COMPLEX 30 MIN: CPT

## 2019-08-28 PROCEDURE — 83735 ASSAY OF MAGNESIUM: CPT

## 2019-08-28 PROCEDURE — 2060000000 HC ICU INTERMEDIATE R&B

## 2019-08-28 PROCEDURE — 85025 COMPLETE CBC W/AUTO DIFF WBC: CPT

## 2019-08-28 PROCEDURE — 2700000000 HC OXYGEN THERAPY PER DAY

## 2019-08-28 PROCEDURE — 84100 ASSAY OF PHOSPHORUS: CPT

## 2019-08-28 PROCEDURE — 2580000003 HC RX 258: Performed by: NURSE PRACTITIONER

## 2019-08-28 PROCEDURE — 2500000003 HC RX 250 WO HCPCS: Performed by: INTERNAL MEDICINE

## 2019-08-28 RX ADMIN — Medication 1200 MG: at 08:29

## 2019-08-28 RX ADMIN — ACETAMINOPHEN 650 MG: 325 TABLET ORAL at 22:12

## 2019-08-28 RX ADMIN — HEPARIN SODIUM 5000 UNITS: 5000 INJECTION INTRAVENOUS; SUBCUTANEOUS at 21:19

## 2019-08-28 RX ADMIN — ROPINIROLE HYDROCHLORIDE 2 MG: 2 TABLET, FILM COATED ORAL at 21:12

## 2019-08-28 RX ADMIN — PANTOPRAZOLE SODIUM 40 MG: 40 TABLET, DELAYED RELEASE ORAL at 17:17

## 2019-08-28 RX ADMIN — ACETAMINOPHEN 650 MG: 325 TABLET ORAL at 12:49

## 2019-08-28 RX ADMIN — LINAGLIPTIN 2.5 MG: 5 TABLET, FILM COATED ORAL at 08:47

## 2019-08-28 RX ADMIN — CARBIDOPA AND LEVODOPA 1 TABLET: 25; 100 TABLET, EXTENDED RELEASE ORAL at 08:47

## 2019-08-28 RX ADMIN — IPRATROPIUM BROMIDE AND ALBUTEROL SULFATE 3 ML: .5; 3 SOLUTION RESPIRATORY (INHALATION) at 09:46

## 2019-08-28 RX ADMIN — SODIUM CHLORIDE, PRESERVATIVE FREE 10 ML: 5 INJECTION INTRAVENOUS at 21:18

## 2019-08-28 RX ADMIN — PANTOPRAZOLE SODIUM 40 MG: 40 TABLET, DELAYED RELEASE ORAL at 08:47

## 2019-08-28 RX ADMIN — INSULIN LISPRO 1 UNITS: 100 INJECTION, SOLUTION INTRAVENOUS; SUBCUTANEOUS at 17:17

## 2019-08-28 RX ADMIN — INSULIN LISPRO 1 UNITS: 100 INJECTION, SOLUTION INTRAVENOUS; SUBCUTANEOUS at 21:18

## 2019-08-28 RX ADMIN — HEPARIN SODIUM 5000 UNITS: 5000 INJECTION INTRAVENOUS; SUBCUTANEOUS at 05:15

## 2019-08-28 RX ADMIN — ACETAMINOPHEN 650 MG: 325 TABLET ORAL at 00:40

## 2019-08-28 RX ADMIN — AMIODARONE HYDROCHLORIDE 200 MG: 200 TABLET ORAL at 08:47

## 2019-08-28 RX ADMIN — CALCITRIOL 0.25 MCG: 0.25 CAPSULE ORAL at 08:47

## 2019-08-28 RX ADMIN — Medication 2 PUFF: at 09:46

## 2019-08-28 RX ADMIN — ATORVASTATIN CALCIUM 20 MG: 20 TABLET, FILM COATED ORAL at 21:12

## 2019-08-28 RX ADMIN — CARBIDOPA AND LEVODOPA 1 TABLET: 25; 100 TABLET, EXTENDED RELEASE ORAL at 14:57

## 2019-08-28 RX ADMIN — ANTI-FUNGAL POWDER MICONAZOLE NITRATE TALC FREE: 1.42 POWDER TOPICAL at 22:12

## 2019-08-28 RX ADMIN — RASAGILINE 1 MG: 1 TABLET ORAL at 08:48

## 2019-08-28 RX ADMIN — ACETAMINOPHEN 650 MG: 325 TABLET ORAL at 17:17

## 2019-08-28 RX ADMIN — ROPINIROLE HYDROCHLORIDE 2 MG: 2 TABLET, FILM COATED ORAL at 08:47

## 2019-08-28 RX ADMIN — ASPIRIN 81 MG: 81 TABLET, COATED ORAL at 08:47

## 2019-08-28 RX ADMIN — SENNOSIDES 17.2 MG: 8.6 TABLET, FILM COATED ORAL at 21:12

## 2019-08-28 RX ADMIN — HEPARIN SODIUM 5000 UNITS: 5000 INJECTION INTRAVENOUS; SUBCUTANEOUS at 14:57

## 2019-08-28 RX ADMIN — ROPINIROLE HYDROCHLORIDE 2 MG: 2 TABLET, FILM COATED ORAL at 14:57

## 2019-08-28 RX ADMIN — SODIUM CHLORIDE: 9 INJECTION, SOLUTION INTRAVENOUS at 00:40

## 2019-08-28 RX ADMIN — Medication 5 MG: at 22:12

## 2019-08-28 RX ADMIN — METOPROLOL TARTRATE 12.5 MG: 25 TABLET, FILM COATED ORAL at 08:29

## 2019-08-28 RX ADMIN — CYCLOBENZAPRINE HYDROCHLORIDE 5 MG: 5 TABLET, FILM COATED ORAL at 22:45

## 2019-08-28 RX ADMIN — ACETAMINOPHEN 650 MG: 325 TABLET ORAL at 08:29

## 2019-08-28 RX ADMIN — Medication 1200 MG: at 21:13

## 2019-08-28 RX ADMIN — METOPROLOL TARTRATE 25 MG: 25 TABLET, FILM COATED ORAL at 21:13

## 2019-08-28 RX ADMIN — CARBIDOPA AND LEVODOPA 1 TABLET: 25; 100 TABLET, EXTENDED RELEASE ORAL at 21:12

## 2019-08-28 ASSESSMENT — PAIN SCALES - GENERAL
PAINLEVEL_OUTOF10: 4
PAINLEVEL_OUTOF10: 4
PAINLEVEL_OUTOF10: 3
PAINLEVEL_OUTOF10: 3
PAINLEVEL_OUTOF10: 8
PAINLEVEL_OUTOF10: 8
PAINLEVEL_OUTOF10: 4
PAINLEVEL_OUTOF10: 8

## 2019-08-28 ASSESSMENT — PAIN DESCRIPTION - ONSET: ONSET: ON-GOING

## 2019-08-28 ASSESSMENT — PAIN DESCRIPTION - DESCRIPTORS: DESCRIPTORS: ACHING

## 2019-08-28 ASSESSMENT — PAIN DESCRIPTION - PROGRESSION: CLINICAL_PROGRESSION: NOT CHANGED

## 2019-08-28 ASSESSMENT — PAIN - FUNCTIONAL ASSESSMENT: PAIN_FUNCTIONAL_ASSESSMENT: ACTIVITIES ARE NOT PREVENTED

## 2019-08-28 ASSESSMENT — PAIN DESCRIPTION - FREQUENCY: FREQUENCY: CONTINUOUS

## 2019-08-28 ASSESSMENT — PAIN DESCRIPTION - ORIENTATION: ORIENTATION: RIGHT

## 2019-08-28 ASSESSMENT — PAIN DESCRIPTION - LOCATION: LOCATION: ARM

## 2019-08-28 NOTE — PROGRESS NOTES
Toilet: Standard  Bathroom Equipment: Grab bars around toilet, Toilet raiser  Home Equipment: Oxygen, Rolling walker, Cane, Quad cane(walker used regularly, 2L of O2 at home)  [employer] Help From: Family, Personal care attendant  ADL Assistance: Needs assistance(pt can dress herself, caregiver or  gives shower)  Homemaking Assistance: Needs assistance( cooks, daughter cleans)  Ambulation Assistance: Independent  Transfer Assistance: Independent  Active : No  Additional Comments: 4 falls in the last 6 months  Cognition   Cognition  Overall Cognitive Status: Exceptions  Arousal/Alertness: Appropriate responses to stimuli  Following Commands: Follows one step commands consistently; Follows one step commands with increased time  Attention Span: Attends with cues to redirect; Difficulty dividing attention  Memory: Appears intact  Safety Judgement: Good awareness of safety precautions  Problem Solving: Assistance required to generate solutions  Insights: Fully aware of deficits    Objective     Observation/Palpation  Observation: Pt very motivated and very aware for her need for assistance and safety. Pt adementally states that 'her doctor does not want her R UE poked and prodded' secondary to an injury. Pt has had multiple bouts of therapy in the past and is very receptive. Pt has Parkinson's and can be difficult to understand at times. Edema: R LE       Strength RLE  Comment: 4-/5 hip and knee  Strength LLE  Comment: 4/5 hip and knee              Ambulation  Ambulation?: Yes  Ambulation 1  Surface: level tile  Device: Rolling Walker  Assistance: Minimal assistance  Quality of Gait: step through pattern, short stride length   Gait Deviations: Increased NANCY; Decreased step length;Decreased step height  Distance: 50 ft   Comments: Pt very motivated to ambulate. Pt became short of breath during the walk and requested to turn around.       Balance  Posture: Good  Sitting - Static: Good  Sitting - Dynamic:

## 2019-08-28 NOTE — PROGRESS NOTES
Progress  Note    Reason for Consult: KRISTIN    Requesting Physician:  Kerrie Gracia MD    Interval History  Cr improving   BP improved significantly   Feels better  Still has SOB and wheezing   Complaining about fluid restriction    HISTORY OF PRESENT ILLNESS:    The patient is a 69 y.o. female who presents with chest pain and SOB, which started on Sunday. Nuclear Stress test showed reversible ischemia. Cardiac cath planned. Creatinine is elevated at 2.89 today, which increased from 2.4 yesterday. Baseline creatinine CKD 4 with baseline creatinine around 1.7-1.9. She states she was recently treated for UTI on bactrim, which she completed over the weekend. K trending 5.1-5.2 during admission. Bicarb level stable. Home meds include lasix. She thinks her weight has been increasing at home. Hx of hypoacalcemia. Ca level ok. She is on vitamin D,  ca supplementation, and calcitriol. Hx of anemia on EMMY outpatient. She c/o leg cramps and feeling of hypocalcemia recently. She increased ca supplementation, symptoms resolved, and she resumed her usual dosages. She denies CP, SOB currently. C/o lightheadedness recently. She does not monitor BP at home. Hypotensive yesterday with SBP 90s. BP is better today. Hx of afib. HR has been stable. She is on lasix and IVF. Excellent urine output. CXR did not show CHF, but did show atelectasis. Prior to Admission medications    Medication Sig Start Date End Date Taking?  Authorizing Provider   darbepoetin albaro-polysorbate (ARANESP) 200 MCG/0.4ML SOSY injection Inject 200 mcg into the skin every 28 days   Yes Historical Provider, MD   cyanocobalamin 1000 MCG/ML injection Inject 1,000 mcg into the muscle every 30 days   Yes Historical Provider, MD   sulfamethoxazole-trimethoprim (BACTRIM DS) 800-160 MG per tablet Take 1 tablet by mouth 2 times daily   Yes Historical Provider, MD   melatonin 5 MG TABS tablet Take 5 mg by mouth nightly as needed (sleep)   Yes Historical dextrose       PRN Meds:acetaminophen, sodium chloride flush, glucose, dextrose, glucagon (rDNA), dextrose, albuterol, Melatonin, cyclobenzaprine    Physical Exam:  Vitals:    08/27/19 1539 08/27/19 1939 08/28/19 0609 08/28/19 0800   BP: (!) 117/46 (!) 131/55  (!) 139/52   Pulse: 74 86  73   Resp: 16 18  20   Temp: 98.2 °F (36.8 °C) 98.5 °F (36.9 °C)  97.5 °F (36.4 °C)   TempSrc: Oral Oral  Oral   SpO2: 100% 100%  100%   Weight:   195 lb 6.4 oz (88.6 kg)    Height:         I/O last 3 completed shifts: In: 1350 [P.O.:800; I.V.:550]  Out: 850 [Urine:850]      General:  Awake, alert, not in distress. Appears to be stated age. HEENT: Atraumatic, normocephalic. Anicteric sclera. Pink and moist oral mucosa. Neck supple. No JVD. Chest: Bilateral air entry, clear to auscultation, diminished bilaterally, no wheezing, rhonchi or rales. Cardiovascular: RRR, S1S2, no murmur, rub or gallop. No lower extremity edema. Abdomen: Soft, non tender to palpation. Musculoskeletal:   No cyanosis or clubbing. Integumentary: Pink, warm and dry. Free from rash or lesions. Skin turgor normal.  CNS: Oriented to person, place and time. Cranial nerves grossly intact. Speech clear. Face symmetrical. No tremor.      Data:    CBC:   Lab Results   Component Value Date    WBC 7.7 08/28/2019    HGB 10.2 (L) 08/28/2019    HCT 34.0 (L) 08/28/2019    .9 (H) 08/28/2019     (L) 08/28/2019     BMP:    Lab Results   Component Value Date     08/28/2019     08/27/2019     08/26/2019    K 4.9 08/28/2019    K 5.1 08/27/2019    K 5.1 08/26/2019    CL 98 08/28/2019     08/27/2019    CL 97 (L) 08/26/2019    CO2 28 08/28/2019    CO2 31 08/27/2019    CO2 31 08/26/2019    BUN 51 (H) 08/28/2019    BUN 50 (H) 08/27/2019    BUN 42 (H) 08/26/2019    CREATININE 2.43 (H) 08/28/2019    CREATININE 2.89 (H) 08/27/2019    CREATININE 2.40 (H) 08/26/2019    GLUCOSE 113 (H) 08/28/2019    GLUCOSE 103 (H) 08/27/2019    GLUCOSE 108 (H) 08/26/2019     CMP:   Lab Results   Component Value Date     08/28/2019    K 4.9 08/28/2019    CL 98 08/28/2019    CO2 28 08/28/2019    BUN 51 08/28/2019    CREATININE 2.43 08/28/2019    GLUCOSE 113 08/28/2019    GLUCOSE 132 03/07/2012    CALCIUM 8.8 08/28/2019    PROT 6.3 06/05/2019    LABALBU 3.2 06/05/2019    LABALBU Detected 11/30/2018    BILITOT 0.43 06/05/2019    ALKPHOS 67 06/05/2019    AST 17 06/05/2019    ALT <5 06/05/2019      Hepatic:   Lab Results   Component Value Date    AST 17 06/05/2019    AST 17 06/04/2019    AST 14 11/28/2018    ALT <5 (L) 06/05/2019    ALT <5 (L) 06/04/2019    ALT <5 (L) 11/28/2018    BILITOT 0.43 06/05/2019    BILITOT 0.52 06/04/2019    BILITOT 0.16 (L) 11/28/2018    ALKPHOS 67 06/05/2019    ALKPHOS 75 06/04/2019    ALKPHOS 42 11/28/2018     BNP:   Lab Results   Component Value Date    BNP 40 06/07/2013     (H) 05/31/2013     Lipids:   Lab Results   Component Value Date    CHOL 135 08/26/2017    HDL 30 (L) 08/26/2017     INR:   Lab Results   Component Value Date    INR 1.0 08/25/2019    INR 1.0 06/05/2019    INR 1.0 05/27/2019     PTH: No results found for: PTH  Phosphorus:    Lab Results   Component Value Date    PHOS 5.0 08/28/2019     Ionized Calcium: No results found for: IONCA  Magnesium:   Lab Results   Component Value Date    MG 1.7 08/28/2019     Albumin:   Lab Results   Component Value Date    LABALBU 3.2 06/05/2019    LABALBU Detected 11/30/2018     Last 3 CK, CKMB, Troponin: @LABRCNT(CKTOTAL:3,CKMB:3,TROPONINI:3)       URINE:)No results found for: [place] Em    Radiology:   Reviewed. Assessment:  1. Acute Kidney Injury-multifactorial       A. Bactrim       B. Vasomotor  2. CKD 4  3. Hyperkalemia  4. HTN with recent hypotension  5. Abnormal stress test  6. Anemia of chronic disease    Plan:  Renal function improving  Potassium WNL  DC IVF  Off lasix.    BP is better   Urine studies not consistent with pre renal   Discontinue fluid restriction. Avoid nephrotoxic drugs, fleet's enemas, NSAIDs. Adequate Iron studies. Continue aranesp. Will be Ok to proceed with cath when Cr is around 2 or less  Starting mycomyst 1200mg x 4 starting     Please do not hesitate to contact us for any further questions/concerns. We will continue to follow along with you.      Electronically signed by Devika Velasquez MD  on 8/28/2019 at 10:41 AM

## 2019-08-28 NOTE — PROGRESS NOTES
Subjective:     Follow-up type 2 diabetes  Polyuria no polydipsia no hypoglycemia blood sugars reviewed  ROS  No fever no chills no GI/ complaints no cardiac complaints no chest pain no palpitation, still with shortness of breath with minimal exertion no PND no orthopnea mild pedal edema, no TIA no bleeding no headache no sore throat no skin lesion  physical exam  General Appearance: alert and oriented to person, place and time and in no acute distress  Skin: warm and dry, no rash or erythema  Head: normocephalic and atraumatic  Eyes: pupils equal, round, and reactive to light, conjunctivae normal and sclera anicteric    Neck: neck supple and non tender without mass   Pulmonary/Chest: clear to auscultation bilaterally- no wheezes, rales or rhonchi, normal air movement, no respiratory distress  Cardiovascular: normal rate, regular rhythm, normal S1 and S2, no gallops, intact distal pulses and no carotid bruits  Abdomen: soft, non-tender, non-distended, normal bowel sounds, no masses or organomegaly  Extremities: Trace edema good pulses negative Homans sign  Neurologic: Alert oriented x3 with no focal deficit    BP (!) 131/55   Pulse 86   Temp 98.5 °F (36.9 °C) (Oral)   Resp 18   Ht 5' (1.524 m)   Wt 172 lb 14.4 oz (78.4 kg)   LMP 04/03/2004 (Within Years)   SpO2 100%   BMI 33.77 kg/m²     CBC:   Lab Results   Component Value Date    WBC 5.8 08/27/2019    RBC 2.86 08/27/2019    HGB 9.1 08/27/2019    HCT 30.4 08/27/2019    .3 08/27/2019    MCH 31.8 08/27/2019    MCHC 29.9 08/27/2019    RDW 15.6 08/27/2019     08/27/2019    MPV 11.0 08/27/2019     CMP:    Lab Results   Component Value Date     08/27/2019    K 5.1 08/27/2019     08/27/2019    CO2 31 08/27/2019    BUN 50 08/27/2019    CREATININE 2.89 08/27/2019    GFRAA 19 08/27/2019    LABGLOM 16 08/27/2019    GLUCOSE 103 08/27/2019    GLUCOSE 132 03/07/2012    PROT 6.3 06/05/2019    LABALBU 3.2 06/05/2019    AILYN Detected 11/30/2018 CALCIUM 8.5 08/27/2019    BILITOT 0.43 06/05/2019    ALKPHOS 67 06/05/2019    AST 17 06/05/2019    ALT <5 06/05/2019        Assessment:  Patient Active Problem List   Diagnosis    Controlled type 2 diabetes mellitus with stage 3 chronic kidney disease, without long-term current use of insulin ([facility])    Hyperlipidemia    Essential hypertension    Chronic leg pain    Paroxysmal atrial fibrillation (HCC)    Asthma    Gastroesophageal reflux disease without esophagitis    ECTOR on CPAP    Type 2 diabetes mellitus with complication, without long-term current use of insulin ([facility])    Spinal stenosis in cervical region    Hypomagnesemia    Hyperphosphaturia    Hypocalcemia    Parkinson's disease ([address]    Acute renal failure ([address]    Acute cystitis with hematuria    Altered mental status    Iron deficiency anemia due to chronic blood loss    Morbid obesity with BMI of 40.0-44.9, adult ([facility])    Hyperplastic polyp of intestine    Diverticulosis    Panlobular emphysema ([facility])    Rapid atrial fibrillation ([facility])    CKD (chronic kidney disease) stage 3, GFR 30-59 ml/min ([facility])    KRISTIN (acute kidney injury) ([address]    Anemia in chronic kidney disease    Chronic respiratory failure with hypoxia and hypercapnia ([facility])    Acute kidney injury superimposed on chronic kidney disease ([address]    CKD stage 4 due to type 2 diabetes mellitus ([address]    E. coli UTI (urinary tract infection)    Nephrolithiasis    Candidal intertrigo    Venous insufficiency    Malabsorption    Anemia of chronic renal failure, stage 4 (severe) ([facility])    Iron deficiency    Coronary atherosclerosis    COPD exacerbation ([facility])    Restless leg syndrome    SIRS (systemic inflammatory response syndrome) ([facility])    Nausea and vomiting in adult    Bacterial UTI    Odynophagia    Esophageal dysphagia    Candida esophagitis ([facility])    Sepsis due to urinary tract infection (HCC)    Chronic respiratory failure with hypoxia ([facility])    Chronic diastolic congestive heart failure (HCC)    History of ESBL E. coli infection    Stage 4 chronic kidney disease (HCC)    Fever    Macrocytic anemia    Hypercalcemia    Monoclonal gammopathy of undetermined significance    Acute nonintractable headache    Anemia of chronic renal failure, stage 4 (severe) ([facility]    Traumatic closed displaced fracture of proximal end of right humerus, initial encounter    Urinary tract infection without hematuria    Metabolic encephalopathy    Chest pain    Acute kidney injury ([facility]     Acute kidney injury on top of CKD 4  Chest pain  Abnormal stress test with reversible ischemia  Chronic respiratory failure with hypoxia  Chronic diastolic CHF  Paroxysmal atrial fibrillation  Type 2 diabetes with renal complications  Type obesity BMI of 30-34 point  COPD  Plan:    Meds labs reviewed continue with anticoagulation nephrology consult appreciated physical therapy occupational therapy continue with before meals and at bedtime Accu-Cheks with insulin coverage see orders      Bobby Barber MD  8:04 PM

## 2019-08-28 NOTE — PROGRESS NOTES
Cardiovascular progress Note          Patient name: Milton Sanz    YOB: 1945  Date of admission:  8/25/2019       Patient seen, examined. Previous clinical entries reviewed. All available laboratory, imaging and ancillary data reviewed. Subjective:      No new angina. Some shortness of breath with wheezing. Systems review:  Constitutional: No fever/chills. HENT: No headache, neck pain or neck stiffness. No sore throat or dysphagia. Gastrointestinal: No abdominal pain, nausea or vomiting. Cardiac: As Above  Respiratory: As above  Neurologic: No new focal weakness or numbness  Psychiatric: Normal mood and mentation       Examination:   Vitals: /75   Pulse 73   Temp 97.9 °F (36.6 °C) (Oral)   Resp 18   Ht 5' (1.524 m)   Wt 195 lb 6.4 oz (88.6 kg)   LMP 04/03/2004 (Within Years)   SpO2 99%   BMI 38.16 kg/m²     Intake/Output Summary (Last 24 hours) at 8/28/2019 1448  Last data filed at 8/27/2019 1835  Gross per 24 hour   Intake 1350 ml   Output 850 ml   Net 500 ml       General appearance: Comfortable in no apparent distress. HEENT: +pallor. No icterus  Neck: Supple. Lungs:Generally decreased breath sounds  Heart: S1,S2  Abdomen: Soft  Extremities: No peripheral edema  Skin: No obvious rashes. Musculoskeletal: No obvious deformities. Neurologic: No focal deficits.      Labs/ Ancillary data:     CBC:   Recent Labs     08/26/19  0626 08/27/19  0434 08/28/19  0648   WBC 7.7 5.8 7.7   HGB 9.8* 9.1* 10.2*   * 100* 126*     BMP:    Recent Labs     08/26/19  0626 08/27/19  0434 08/28/19  0648    141 140   K 5.1 5.1 4.9   CL 97* 100 98   CO2 31 31 28   BUN 42* 50* 51*   CREATININE 2.40* 2.89* 2.43*   GLUCOSE 108* 103* 113*     Troponin:   Recent Labs     08/25/19  1806 08/25/19  2129 08/25/19  2350   TROPONINT NOT REPORTED NOT REPORTED NOT REPORTED           Medications:   Scheduled Meds:   ipratropium-albuterol  3 mL Inhalation TID    metoprolol tartrate  12.5

## 2019-08-29 LAB
ABSOLUTE EOS #: 0.17 K/UL (ref 0–0.44)
ABSOLUTE IMMATURE GRANULOCYTE: 0.05 K/UL (ref 0–0.3)
ABSOLUTE LYMPH #: 1.62 K/UL (ref 1.1–3.7)
ABSOLUTE MONO #: 0.56 K/UL (ref 0.1–1.2)
ANION GAP SERPL CALCULATED.3IONS-SCNC: 12 MMOL/L (ref 9–17)
BASOPHILS # BLD: 1 % (ref 0–2)
BASOPHILS ABSOLUTE: 0.05 K/UL (ref 0–0.2)
BUN BLDV-MCNC: 48 MG/DL (ref 8–23)
BUN/CREAT BLD: 23 (ref 9–20)
CALCIUM SERPL-MCNC: 8.9 MG/DL (ref 8.6–10.4)
CHLORIDE BLD-SCNC: 102 MMOL/L (ref 98–107)
CO2: 27 MMOL/L (ref 20–31)
CREAT SERPL-MCNC: 2.06 MG/DL (ref 0.5–0.9)
DIFFERENTIAL TYPE: ABNORMAL
EKG ATRIAL RATE: 76 BPM
EKG P AXIS: 44 DEGREES
EKG P-R INTERVAL: 202 MS
EKG Q-T INTERVAL: 402 MS
EKG QRS DURATION: 98 MS
EKG QTC CALCULATION (BAZETT): 452 MS
EKG R AXIS: -16 DEGREES
EKG T AXIS: 55 DEGREES
EKG VENTRICULAR RATE: 76 BPM
EOSINOPHILS RELATIVE PERCENT: 2 % (ref 1–4)
GFR AFRICAN AMERICAN: 29 ML/MIN
GFR NON-AFRICAN AMERICAN: 24 ML/MIN
GFR SERPL CREATININE-BSD FRML MDRD: ABNORMAL ML/MIN/{1.73_M2}
GFR SERPL CREATININE-BSD FRML MDRD: ABNORMAL ML/MIN/{1.73_M2}
GLUCOSE BLD-MCNC: 110 MG/DL (ref 70–99)
GLUCOSE BLD-MCNC: 125 MG/DL (ref 65–105)
GLUCOSE BLD-MCNC: 139 MG/DL (ref 65–105)
GLUCOSE BLD-MCNC: 149 MG/DL (ref 65–105)
GLUCOSE BLD-MCNC: 92 MG/DL (ref 65–105)
HCT VFR BLD CALC: 32 % (ref 36.3–47.1)
HEMOGLOBIN: 9.3 G/DL (ref 11.9–15.1)
IMMATURE GRANULOCYTES: 1 %
LYMPHOCYTES # BLD: 23 % (ref 24–43)
MAGNESIUM: 1.7 MG/DL (ref 1.6–2.6)
MCH RBC QN AUTO: 31.4 PG (ref 25.2–33.5)
MCHC RBC AUTO-ENTMCNC: 29.1 G/DL (ref 28.4–34.8)
MCV RBC AUTO: 108.1 FL (ref 82.6–102.9)
MONOCYTES # BLD: 8 % (ref 3–12)
NRBC AUTOMATED: 0 PER 100 WBC
PDW BLD-RTO: 15.2 % (ref 11.8–14.4)
PHOSPHORUS: 4.6 MG/DL (ref 2.6–4.5)
PLATELET # BLD: 120 K/UL (ref 138–453)
PLATELET ESTIMATE: ABNORMAL
PMV BLD AUTO: 10.8 FL (ref 8.1–13.5)
POTASSIUM SERPL-SCNC: 4.7 MMOL/L (ref 3.7–5.3)
RBC # BLD: 2.96 M/UL (ref 3.95–5.11)
RBC # BLD: ABNORMAL 10*6/UL
SEG NEUTROPHILS: 65 % (ref 36–65)
SEGMENTED NEUTROPHILS ABSOLUTE COUNT: 4.65 K/UL (ref 1.5–8.1)
SODIUM BLD-SCNC: 141 MMOL/L (ref 135–144)
WBC # BLD: 7.1 K/UL (ref 3.5–11.3)
WBC # BLD: ABNORMAL 10*3/UL

## 2019-08-29 PROCEDURE — 6370000000 HC RX 637 (ALT 250 FOR IP): Performed by: INTERNAL MEDICINE

## 2019-08-29 PROCEDURE — 6360000002 HC RX W HCPCS: Performed by: NURSE PRACTITIONER

## 2019-08-29 PROCEDURE — 2060000000 HC ICU INTERMEDIATE R&B

## 2019-08-29 PROCEDURE — 97166 OT EVAL MOD COMPLEX 45 MIN: CPT

## 2019-08-29 PROCEDURE — 97110 THERAPEUTIC EXERCISES: CPT

## 2019-08-29 PROCEDURE — 93010 ELECTROCARDIOGRAM REPORT: CPT | Performed by: INTERNAL MEDICINE

## 2019-08-29 PROCEDURE — 6370000000 HC RX 637 (ALT 250 FOR IP): Performed by: NURSE PRACTITIONER

## 2019-08-29 PROCEDURE — 36415 COLL VENOUS BLD VENIPUNCTURE: CPT

## 2019-08-29 PROCEDURE — 2580000003 HC RX 258: Performed by: INTERNAL MEDICINE

## 2019-08-29 PROCEDURE — 85025 COMPLETE CBC W/AUTO DIFF WBC: CPT

## 2019-08-29 PROCEDURE — 94640 AIRWAY INHALATION TREATMENT: CPT

## 2019-08-29 PROCEDURE — 83735 ASSAY OF MAGNESIUM: CPT

## 2019-08-29 PROCEDURE — 97116 GAIT TRAINING THERAPY: CPT

## 2019-08-29 PROCEDURE — 84100 ASSAY OF PHOSPHORUS: CPT

## 2019-08-29 PROCEDURE — 6360000002 HC RX W HCPCS: Performed by: INTERNAL MEDICINE

## 2019-08-29 PROCEDURE — 82947 ASSAY GLUCOSE BLOOD QUANT: CPT

## 2019-08-29 PROCEDURE — 97535 SELF CARE MNGMENT TRAINING: CPT

## 2019-08-29 PROCEDURE — 80048 BASIC METABOLIC PNL TOTAL CA: CPT

## 2019-08-29 RX ORDER — MORPHINE SULFATE 2 MG/ML
1 INJECTION, SOLUTION INTRAMUSCULAR; INTRAVENOUS EVERY 8 HOURS PRN
Status: DISCONTINUED | OUTPATIENT
Start: 2019-08-29 | End: 2019-09-08

## 2019-08-29 RX ADMIN — METOPROLOL TARTRATE 25 MG: 25 TABLET, FILM COATED ORAL at 08:40

## 2019-08-29 RX ADMIN — Medication 1 MG: at 19:18

## 2019-08-29 RX ADMIN — Medication 1 MG: at 11:16

## 2019-08-29 RX ADMIN — PANTOPRAZOLE SODIUM 40 MG: 40 TABLET, DELAYED RELEASE ORAL at 05:50

## 2019-08-29 RX ADMIN — CARBIDOPA AND LEVODOPA 1 TABLET: 25; 100 TABLET, EXTENDED RELEASE ORAL at 14:08

## 2019-08-29 RX ADMIN — ANTI-FUNGAL POWDER MICONAZOLE NITRATE TALC FREE: 1.42 POWDER TOPICAL at 22:04

## 2019-08-29 RX ADMIN — ASPIRIN 81 MG: 81 TABLET, COATED ORAL at 08:40

## 2019-08-29 RX ADMIN — Medication 2 PUFF: at 09:30

## 2019-08-29 RX ADMIN — ROPINIROLE HYDROCHLORIDE 2 MG: 2 TABLET, FILM COATED ORAL at 20:41

## 2019-08-29 RX ADMIN — LINAGLIPTIN 2.5 MG: 5 TABLET, FILM COATED ORAL at 08:40

## 2019-08-29 RX ADMIN — ROPINIROLE HYDROCHLORIDE 2 MG: 2 TABLET, FILM COATED ORAL at 08:40

## 2019-08-29 RX ADMIN — ACETAMINOPHEN 650 MG: 325 TABLET ORAL at 08:43

## 2019-08-29 RX ADMIN — MAGNESIUM GLUCONATE 500 MG ORAL TABLET 400 MG: 500 TABLET ORAL at 11:16

## 2019-08-29 RX ADMIN — IPRATROPIUM BROMIDE AND ALBUTEROL SULFATE 3 ML: .5; 3 SOLUTION RESPIRATORY (INHALATION) at 09:30

## 2019-08-29 RX ADMIN — Medication 5 MG: at 22:41

## 2019-08-29 RX ADMIN — ROPINIROLE HYDROCHLORIDE 2 MG: 2 TABLET, FILM COATED ORAL at 14:08

## 2019-08-29 RX ADMIN — IPRATROPIUM BROMIDE AND ALBUTEROL SULFATE 3 ML: .5; 3 SOLUTION RESPIRATORY (INHALATION) at 14:21

## 2019-08-29 RX ADMIN — ANTI-FUNGAL POWDER MICONAZOLE NITRATE TALC FREE: 1.42 POWDER TOPICAL at 08:40

## 2019-08-29 RX ADMIN — MAGNESIUM GLUCONATE 500 MG ORAL TABLET 400 MG: 500 TABLET ORAL at 20:41

## 2019-08-29 RX ADMIN — SODIUM CHLORIDE, PRESERVATIVE FREE 10 ML: 5 INJECTION INTRAVENOUS at 08:40

## 2019-08-29 RX ADMIN — Medication 1200 MG: at 08:39

## 2019-08-29 RX ADMIN — HEPARIN SODIUM 5000 UNITS: 5000 INJECTION INTRAVENOUS; SUBCUTANEOUS at 14:08

## 2019-08-29 RX ADMIN — HEPARIN SODIUM 5000 UNITS: 5000 INJECTION INTRAVENOUS; SUBCUTANEOUS at 20:41

## 2019-08-29 RX ADMIN — CALCITRIOL 0.25 MCG: 0.25 CAPSULE ORAL at 08:40

## 2019-08-29 RX ADMIN — HEPARIN SODIUM 5000 UNITS: 5000 INJECTION INTRAVENOUS; SUBCUTANEOUS at 05:50

## 2019-08-29 RX ADMIN — Medication 1200 MG: at 20:42

## 2019-08-29 RX ADMIN — CARBIDOPA AND LEVODOPA 1 TABLET: 25; 100 TABLET, EXTENDED RELEASE ORAL at 20:41

## 2019-08-29 RX ADMIN — SODIUM CHLORIDE, PRESERVATIVE FREE 10 ML: 5 INJECTION INTRAVENOUS at 19:54

## 2019-08-29 RX ADMIN — ATORVASTATIN CALCIUM 20 MG: 20 TABLET, FILM COATED ORAL at 20:41

## 2019-08-29 RX ADMIN — INSULIN LISPRO 1 UNITS: 100 INJECTION, SOLUTION INTRAVENOUS; SUBCUTANEOUS at 11:56

## 2019-08-29 RX ADMIN — RASAGILINE 1 MG: 1 TABLET ORAL at 08:48

## 2019-08-29 RX ADMIN — AMIODARONE HYDROCHLORIDE 200 MG: 200 TABLET ORAL at 08:40

## 2019-08-29 RX ADMIN — CARBIDOPA AND LEVODOPA 1 TABLET: 25; 100 TABLET, EXTENDED RELEASE ORAL at 08:40

## 2019-08-29 RX ADMIN — METOPROLOL TARTRATE 25 MG: 25 TABLET, FILM COATED ORAL at 20:41

## 2019-08-29 RX ADMIN — PANTOPRAZOLE SODIUM 40 MG: 40 TABLET, DELAYED RELEASE ORAL at 17:02

## 2019-08-29 RX ADMIN — SENNOSIDES 17.2 MG: 8.6 TABLET, FILM COATED ORAL at 20:41

## 2019-08-29 ASSESSMENT — PAIN DESCRIPTION - PAIN TYPE
TYPE: ACUTE PAIN
TYPE: ACUTE PAIN

## 2019-08-29 ASSESSMENT — PAIN SCALES - GENERAL
PAINLEVEL_OUTOF10: 9
PAINLEVEL_OUTOF10: 7
PAINLEVEL_OUTOF10: 6
PAINLEVEL_OUTOF10: 9
PAINLEVEL_OUTOF10: 0
PAINLEVEL_OUTOF10: 9
PAINLEVEL_OUTOF10: 8

## 2019-08-29 ASSESSMENT — PAIN DESCRIPTION - LOCATION
LOCATION: ARM

## 2019-08-29 ASSESSMENT — PAIN DESCRIPTION - ORIENTATION: ORIENTATION: RIGHT

## 2019-08-29 NOTE — PROGRESS NOTES
Stairs - Rails: Right  Bathroom Shower/Tub: Walk-in shower, Shower chair with back  Bathroom Toilet: Standard  Bathroom Equipment: Grab bars around toilet, Grab bars in shower  Home Equipment: Oxygen, Rolling walker, Cane, Quad cane, 4 wheeled walker(walker used regularly, 2L of O2 at home)  Julia Furnish Help From: Family, Personal care attendant  ADL Assistance: Needs assistance(pt can dress herself, caregiver (4 days per week) or  gives shower)  Homemaking Assistance: Needs assistance( cooks, daughter cleans)  Ambulation Assistance: Independent  Transfer Assistance: Independent  Active : No  Occupation: Retired  Type of occupation: Restaurant work, childcare, [employer]  Leisure & Hobbies: Pt is learning how to ariana  Additional Comments: 4 falls in the last 6 months.  works full time. Objective   Vision: Impaired  Vision Exceptions: Wears glasses at all times  Hearing: Exceptions to [facility]  Hearing Exceptions: Hard of hearing/hearing concerns    Orientation  Overall Orientation Status: Within Functional Limits  Observation/Palpation  Posture: Fair  Observation: Pt is pleasant and motivated to participate in therapy and very aware for her need for assistance and safety. Pt has Parkinson's disease and can be difficult to understand at times. Balance  Sitting Balance: Supervision  Standing Balance: Stand by assistance  Functional Mobility  Functional - Mobility Device: Rolling Walker  Activity: Other  Assist Level: Minimal assistance  Functional Mobility Comments: Pt completed functional mob around room with portable O2 tank. Pt educated on walker safety and fall prevention techs. ADL  Feeding: Independent  Grooming: Stand by assistance(Seated tasks)  UE Bathing: Moderate assistance  LE Bathing: Maximum assistance  UE Dressing: Moderate assistance  LE Dressing: Maximum assistance  Toileting:  Moderate assistance  Tone RUE  RUE Tone: Normotonic  Tone LUE  LUE Tone:

## 2019-08-29 NOTE — PROGRESS NOTES
region, Type II or unspecified type diabetes mellitus without mention of complication, not stated as uncontrolled, Urethral caruncle, Wears glasses, and Wears glasses. has a past surgical history that includes Cervical disc surgery (2012); Appendectomy; Tonsillectomy and adenoidectomy (1953); hernia repair (1999); eye surgery (Bilateral, 2017); Cervical spine surgery (5/31/13); laminectomy (05/31/2013); Upper gastrointestinal endoscopy (12/28/2016); Colonoscopy (2009); Colonoscopy (12/29/2016); Colonoscopy (12/30/2016); Colonoscopy (04/25/2017); pr colsc flx w/removal lesion by hot bx forceps (N/A, 4/25/2017); Cystorrhaphy (04/04/2018); pr cystourethroscopy,biopsies (N/A, 4/4/2018); pr [facility] incl fluor gdnce dx w/cell washg spx (N/A, 6/5/2018); Upper gastrointestinal endoscopy (N/A, 8/29/2018); shoulder fracture surgery (Right, 5/28/2019); Hardware Removal (Right, 07/05/2019); and Hardware Removal (Right, 7/5/2019). Restrictions  Restrictions/Precautions  Restrictions/Precautions: General Precautions, Modified Diet, Contact Precautions  Position Activity Restriction  Other position/activity restrictions: telemetry, 2L O2 nasal cannula  Subjective   General  Family / Caregiver Present: No  Subjective  Subjective: Pt agreeable to PT  Pain Screening  Patient Currently in Pain: Denies  Vital Signs  Patient Currently in Pain: Denies       Orientation  Orientation  Overall Orientation Status: Within Functional Limits  Cognition   Cognition  Overall Cognitive Status: Exceptions  Arousal/Alertness: Appropriate responses to stimuli  Following Commands: Follows one step commands consistently; Follows one step commands with increased time  Memory: Appears intact  Safety Judgement: Good awareness of safety precautions  Problem Solving: Assistance required to generate solutions  Insights: Fully aware of deficits  Objective   Bed mobility  Comment: Pt up to chair  Transfers  Sit to Stand: Minimal Assistance  Stand to sit:

## 2019-08-29 NOTE — PROGRESS NOTES
<5 06/05/2019        Assessment:  Patient Active Problem List   Diagnosis    Controlled type 2 diabetes mellitus with stage 3 chronic kidney disease, without long-term current use of insulin ([facility])    Hyperlipidemia    Essential hypertension    Chronic leg pain    Paroxysmal atrial fibrillation (HCC)    Asthma    Gastroesophageal reflux disease without esophagitis    ECTOR on CPAP    Type 2 diabetes mellitus with complication, without long-term current use of insulin ([facility])    Spinal stenosis in cervical region    Hypomagnesemia    Hyperphosphaturia    Hypocalcemia    Parkinson's disease ([address]    Acute renal failure ([address]    Acute cystitis with hematuria    Altered mental status    Iron deficiency anemia due to chronic blood loss    Morbid obesity with BMI of 40.0-44.9, adult ([facility])    Hyperplastic polyp of intestine    Diverticulosis    Panlobular emphysema ([facility])    Rapid atrial fibrillation ([facility])    CKD (chronic kidney disease) stage 3, GFR 30-59 ml/min ([facility])    KRISTIN (acute kidney injury) ([address]    Anemia in chronic kidney disease    Chronic respiratory failure with hypoxia and hypercapnia ([facility])    Acute kidney injury superimposed on chronic kidney disease ([address]    CKD stage 4 due to type 2 diabetes mellitus ([address]    E. coli UTI (urinary tract infection)    Nephrolithiasis    Candidal intertrigo    Venous insufficiency    Malabsorption    Anemia of chronic renal failure, stage 4 (severe) ([facility])    Iron deficiency    Coronary atherosclerosis    COPD exacerbation ([facility])    Restless leg syndrome    SIRS (systemic inflammatory response syndrome) ([facility])    Nausea and vomiting in adult    Bacterial UTI    Odynophagia    Esophageal dysphagia    Candida esophagitis (HCC)    Sepsis due to urinary tract infection ([facility])    Chronic respiratory failure with hypoxia ([facility])    Chronic diastolic congestive heart failure ([facility])    History of ESBL E. coli infection    Stage 4 chronic kidney

## 2019-08-29 NOTE — PROGRESS NOTES
Pulmonary Critical Care Progress Note  Page Earthly, CNP / Dr. Fuad Ames M.D. Patient seen for the follow up of chest pain, respiratory failure, ECTOR    Subjective:  She had complaints of right-sided arm pain yesterday, history of arm fracture. She denies significant shortness of breath or cough, no chest pain. No acute events noted overnight. Examination:  Vitals: BP (!) 124/48   Pulse 66   Temp 97.3 °F (36.3 °C) (Axillary)   Resp 18   Ht 5' (1.524 m)   Wt 167 lb 8 oz (76 kg)   LMP 04/03/2004 (Within Years)   SpO2 100%   BMI 32.71 kg/m²     General appearance: alert and cooperative with exam, sitting up in chair in no distress, finished breakfast  Neck: No JVD  Lungs: Moderate air exchange with no wheeze  Heart: regular rate and rhythm, S1, S2 normal, no gallop  Abdomen: Soft, non tender, + BS  Extremities: no cyanosis or clubbing. No significant edema    LABs:  CBC:   Recent Labs     08/28/19  0648 08/29/19  0634   WBC 7.7 7.1   HGB 10.2* 9.3*   HCT 34.0* 32.0*   * 120*     BMP:   Recent Labs     08/28/19  0648 08/29/19  0634    141   K 4.9 4.7   CO2 28 27   BUN 51* 48*   CREATININE 2.43* 2.06*   LABGLOM 20* 24*   GLUCOSE 113* 110*     Impression:  · Chest pain/abnormal stress test  · Chronic respiratory failure  · Chronic diastolic heart failure  · Atelectasis  · COPD/30-pack-year smoking history  · KRISTIN on CKD, improving   · A.  Fib  · Obstructive sleep apnea/obesity    Recommendations:  · 2 liters/min via nasal cannula  · BiPAP for sleep and as needed   · Albuterol and Ipratropium Q 8 hours and prn  · Dulera 200  · Abnormal stress test, cardiology considering cardiac cath ?  tomorrow  · Nephrology on consult  · Labs: CBC and BMP in am  · DVT prophylaxis with low molecular weight heparin  · History of anemia and GI bleed preventing her from being on chronic anticoagulation  · Protonix for PUD prophylaxis   · Incentive spirometry every hour while awake  · Will follow with

## 2019-08-29 NOTE — PROGRESS NOTES
calcitRIOL (ROCALTROL) 0.25 MCG capsule Take 0.25 mcg by mouth daily   Yes Historical Provider, MD   furosemide (LASIX) 20 MG tablet Take 1 tablet by mouth daily 5/30/19  Yes Gwen Navarro MD   atorvastatin (LIPITOR) 20 MG tablet TAKE 1 TABLET DAILY 4/18/19  Yes Lesly Hill DO   carbidopa-levodopa (SINEMET CR)  MG per extended release tablet Take 1 tablet by mouth 4 times daily  Patient taking differently: Take 1 tablet by mouth 3 times daily  4/9/19  Yes Lesly Hill DO   rOPINIRole (REQUIP) 2 MG tablet Take 1 tablet by mouth 3 times daily 4/9/19  Yes Lesly Hill DO   pantoprazole (PROTONIX) 40 MG tablet TAKE 1 TABLET TWICE A DAY BEFORE MEALS 2/7/19  Yes Lesly Hill DO   linagliptin (TRADJENTA) 5 MG tablet Take 0.5 tablets by mouth daily 1/16/19  Yes Lesly Hill DO   amiodarone (CORDARONE) 200 MG tablet Take 200 mg by mouth daily    Yes Historical Provider, MD   senna (SENOKOT) 8.6 MG tablet Take 2 tablets by mouth nightly    Yes Historical Provider, MD   aspirin 81 MG tablet Take 81 mg by mouth daily    Yes Historical Provider, MD   ferrous sulfate 325 (65 Fe) MG EC tablet Take 1 tablet by mouth 2 times daily (with meals) 12/21/17  Yes Jose Perez,    rasagiline mesylate 1 MG TABS Take 1 tablet by mouth daily   Yes Historical Provider, MD   calcium citrate (CALCITRATE) 250 MG TABS tablet Take 500 mg by mouth 3 times daily    Historical Provider, MD   nystatin-triamcinolone (MYCOLOG II) 725601-8.1 UNIT/GM-% cream Apply topically 2 times daily abd folds    Historical Provider, MD   cyclobenzaprine (FLEXERIL) 5 MG tablet Take 5 mg by mouth 3 times daily as needed for Muscle spasms    Historical Provider, MD   budesonide-formoterol (SYMBICORT) 80-4.5 MCG/ACT AERO Inhale 2 puffs into the lungs 2 times daily    Historical Provider, MD   ipratropium-albuterol (DUONEB) 0.5-2.5 (3) MG/3ML SOLN nebulizer solution Inhale 3 mLs into the lungs three times daily 3/20/19   Lesly Hill, DO mycomyst 1200mg x 4 starting     Please do not hesitate to contact us for any further questions/concerns. We will continue to follow along with you.      Electronically signed by Shawn Wallace MD  on 8/29/2019 at 9:51 AM

## 2019-08-29 NOTE — PLAN OF CARE
Problem: Falls - Risk of:  Goal: Will remain free from falls  Description  Will remain free from falls  Outcome: Met This Shift  Note:   The patient remained free from falls this shift, call light within reach, bed in locked and lowest position. Side rails up x2. Continue to monitor closely. Problem: Pain:  Goal: Pain level will decrease  Description  Pain level will decrease  Outcome: Met This Shift  Note:   Patient's pain has been well controlled throughout the entire shift, please see MAR. Problem: Breathing Pattern - Ineffective:  Goal: Ability to achieve and maintain a regular respiratory rate will improve  Description  Ability to achieve and maintain a regular respiratory rate will improve  Note:   The patient remains on 2L nasal cannula throughout shift (which is what she utilizes at home). Problem: Risk for Impaired Skin Integrity  Goal: Tissue integrity - skin and mucous membranes  Description  Structural intactness and normal physiological function of skin and  mucous membranes. Note:   The patient's skin remains dry and intact. Assist patient with turning Q2 hours and PRN.

## 2019-08-30 ENCOUNTER — APPOINTMENT (OUTPATIENT)
Dept: CT IMAGING | Age: 74
DRG: 287 | End: 2019-08-30
Payer: COMMERCIAL

## 2019-08-30 ENCOUNTER — APPOINTMENT (OUTPATIENT)
Dept: CARDIAC CATH/INVASIVE PROCEDURES | Age: 74
DRG: 287 | End: 2019-08-30
Payer: COMMERCIAL

## 2019-08-30 LAB
ABSOLUTE EOS #: 0.04 K/UL (ref 0–0.44)
ABSOLUTE EOS #: 0.14 K/UL (ref 0–0.44)
ABSOLUTE IMMATURE GRANULOCYTE: 0.07 K/UL (ref 0–0.3)
ABSOLUTE IMMATURE GRANULOCYTE: 0.07 K/UL (ref 0–0.3)
ABSOLUTE LYMPH #: 0.71 K/UL (ref 1.1–3.7)
ABSOLUTE LYMPH #: 1.56 K/UL (ref 1.1–3.7)
ABSOLUTE MONO #: 0.12 K/UL (ref 0.1–1.2)
ABSOLUTE MONO #: 0.74 K/UL (ref 0.1–1.2)
ANION GAP SERPL CALCULATED.3IONS-SCNC: 14 MMOL/L (ref 9–17)
BASOPHILS # BLD: 0 % (ref 0–2)
BASOPHILS # BLD: 0 % (ref 0–2)
BASOPHILS ABSOLUTE: 0.04 K/UL (ref 0–0.2)
BASOPHILS ABSOLUTE: 0.04 K/UL (ref 0–0.2)
BUN BLDV-MCNC: 48 MG/DL (ref 8–23)
BUN/CREAT BLD: 21 (ref 9–20)
CALCIUM SERPL-MCNC: 8.5 MG/DL (ref 8.6–10.4)
CHLORIDE BLD-SCNC: 100 MMOL/L (ref 98–107)
CO2: 27 MMOL/L (ref 20–31)
CREAT SERPL-MCNC: 2.25 MG/DL (ref 0.5–0.9)
DIFFERENTIAL TYPE: ABNORMAL
DIFFERENTIAL TYPE: ABNORMAL
EOSINOPHILS RELATIVE PERCENT: 0 % (ref 1–4)
EOSINOPHILS RELATIVE PERCENT: 2 % (ref 1–4)
GFR AFRICAN AMERICAN: 26 ML/MIN
GFR NON-AFRICAN AMERICAN: 21 ML/MIN
GFR SERPL CREATININE-BSD FRML MDRD: ABNORMAL ML/MIN/{1.73_M2}
GFR SERPL CREATININE-BSD FRML MDRD: ABNORMAL ML/MIN/{1.73_M2}
GLUCOSE BLD-MCNC: 115 MG/DL (ref 70–99)
GLUCOSE BLD-MCNC: 153 MG/DL (ref 65–105)
GLUCOSE BLD-MCNC: 182 MG/DL (ref 65–105)
HCT VFR BLD CALC: 26.7 % (ref 36.3–47.1)
HCT VFR BLD CALC: 31.9 % (ref 36.3–47.1)
HEMOGLOBIN: 7.8 G/DL (ref 11.9–15.1)
HEMOGLOBIN: 9.4 G/DL (ref 11.9–15.1)
IMMATURE GRANULOCYTES: 1 %
IMMATURE GRANULOCYTES: 1 %
INR BLD: 1
LYMPHOCYTES # BLD: 18 % (ref 24–43)
LYMPHOCYTES # BLD: 6 % (ref 24–43)
MAGNESIUM: 1.7 MG/DL (ref 1.6–2.6)
MCH RBC QN AUTO: 31.8 PG (ref 25.2–33.5)
MCH RBC QN AUTO: 31.9 PG (ref 25.2–33.5)
MCHC RBC AUTO-ENTMCNC: 29.2 G/DL (ref 28.4–34.8)
MCHC RBC AUTO-ENTMCNC: 29.5 G/DL (ref 28.4–34.8)
MCV RBC AUTO: 108.1 FL (ref 82.6–102.9)
MCV RBC AUTO: 109 FL (ref 82.6–102.9)
MONOCYTES # BLD: 1 % (ref 3–12)
MONOCYTES # BLD: 8 % (ref 3–12)
NRBC AUTOMATED: 0 PER 100 WBC
NRBC AUTOMATED: 0 PER 100 WBC
PARTIAL THROMBOPLASTIN TIME: 21 SEC (ref 23–31)
PDW BLD-RTO: 15.1 % (ref 11.8–14.4)
PDW BLD-RTO: 15.3 % (ref 11.8–14.4)
PHOSPHORUS: 4.6 MG/DL (ref 2.6–4.5)
PLATELET # BLD: 107 K/UL (ref 138–453)
PLATELET # BLD: 128 K/UL (ref 138–453)
PLATELET ESTIMATE: ABNORMAL
PLATELET ESTIMATE: ABNORMAL
PMV BLD AUTO: 10.8 FL (ref 8.1–13.5)
PMV BLD AUTO: 11 FL (ref 8.1–13.5)
POTASSIUM SERPL-SCNC: 4.9 MMOL/L (ref 3.7–5.3)
PROTHROMBIN TIME: 10.3 SEC (ref 9.7–11.6)
RBC # BLD: 2.45 M/UL (ref 3.95–5.11)
RBC # BLD: 2.95 M/UL (ref 3.95–5.11)
RBC # BLD: ABNORMAL 10*6/UL
RBC # BLD: ABNORMAL 10*6/UL
SEG NEUTROPHILS: 71 % (ref 36–65)
SEG NEUTROPHILS: 91 % (ref 36–65)
SEGMENTED NEUTROPHILS ABSOLUTE COUNT: 10.43 K/UL (ref 1.5–8.1)
SEGMENTED NEUTROPHILS ABSOLUTE COUNT: 6.35 K/UL (ref 1.5–8.1)
SODIUM BLD-SCNC: 141 MMOL/L (ref 135–144)
WBC # BLD: 11.4 K/UL (ref 3.5–11.3)
WBC # BLD: 8.9 K/UL (ref 3.5–11.3)
WBC # BLD: ABNORMAL 10*3/UL
WBC # BLD: ABNORMAL 10*3/UL

## 2019-08-30 PROCEDURE — 74176 CT ABD & PELVIS W/O CONTRAST: CPT

## 2019-08-30 PROCEDURE — 84100 ASSAY OF PHOSPHORUS: CPT

## 2019-08-30 PROCEDURE — B2111ZZ FLUOROSCOPY OF MULTIPLE CORONARY ARTERIES USING LOW OSMOLAR CONTRAST: ICD-10-PCS | Performed by: INTERNAL MEDICINE

## 2019-08-30 PROCEDURE — 2580000003 HC RX 258: Performed by: INTERNAL MEDICINE

## 2019-08-30 PROCEDURE — 86920 COMPATIBILITY TEST SPIN: CPT

## 2019-08-30 PROCEDURE — 83735 ASSAY OF MAGNESIUM: CPT

## 2019-08-30 PROCEDURE — 2500000003 HC RX 250 WO HCPCS

## 2019-08-30 PROCEDURE — 94640 AIRWAY INHALATION TREATMENT: CPT

## 2019-08-30 PROCEDURE — 86850 RBC ANTIBODY SCREEN: CPT

## 2019-08-30 PROCEDURE — 2060000000 HC ICU INTERMEDIATE R&B

## 2019-08-30 PROCEDURE — 6370000000 HC RX 637 (ALT 250 FOR IP): Performed by: INTERNAL MEDICINE

## 2019-08-30 PROCEDURE — 6360000002 HC RX W HCPCS: Performed by: INTERNAL MEDICINE

## 2019-08-30 PROCEDURE — 85610 PROTHROMBIN TIME: CPT

## 2019-08-30 PROCEDURE — 85730 THROMBOPLASTIN TIME PARTIAL: CPT

## 2019-08-30 PROCEDURE — 36430 TRANSFUSION BLD/BLD COMPNT: CPT

## 2019-08-30 PROCEDURE — 6360000002 HC RX W HCPCS: Performed by: NURSE PRACTITIONER

## 2019-08-30 PROCEDURE — C1894 INTRO/SHEATH, NON-LASER: HCPCS

## 2019-08-30 PROCEDURE — 86900 BLOOD TYPING SEROLOGIC ABO: CPT

## 2019-08-30 PROCEDURE — 80048 BASIC METABOLIC PNL TOTAL CA: CPT

## 2019-08-30 PROCEDURE — 36415 COLL VENOUS BLD VENIPUNCTURE: CPT

## 2019-08-30 PROCEDURE — P9016 RBC LEUKOCYTES REDUCED: HCPCS

## 2019-08-30 PROCEDURE — 82947 ASSAY GLUCOSE BLOOD QUANT: CPT

## 2019-08-30 PROCEDURE — 85025 COMPLETE CBC W/AUTO DIFF WBC: CPT

## 2019-08-30 PROCEDURE — 4A023N7 MEASUREMENT OF CARDIAC SAMPLING AND PRESSURE, LEFT HEART, PERCUTANEOUS APPROACH: ICD-10-PCS | Performed by: INTERNAL MEDICINE

## 2019-08-30 PROCEDURE — 93458 L HRT ARTERY/VENTRICLE ANGIO: CPT | Performed by: INTERNAL MEDICINE

## 2019-08-30 PROCEDURE — C1760 CLOSURE DEV, VASC: HCPCS

## 2019-08-30 PROCEDURE — 6360000004 HC RX CONTRAST MEDICATION

## 2019-08-30 PROCEDURE — 86901 BLOOD TYPING SEROLOGIC RH(D): CPT

## 2019-08-30 PROCEDURE — 6360000002 HC RX W HCPCS

## 2019-08-30 PROCEDURE — B2151ZZ FLUOROSCOPY OF LEFT HEART USING LOW OSMOLAR CONTRAST: ICD-10-PCS | Performed by: INTERNAL MEDICINE

## 2019-08-30 PROCEDURE — 2709999900 HC NON-CHARGEABLE SUPPLY

## 2019-08-30 RX ORDER — ACETAMINOPHEN 325 MG/1
650 TABLET ORAL EVERY 4 HOURS PRN
Status: DISCONTINUED | OUTPATIENT
Start: 2019-08-30 | End: 2019-09-11 | Stop reason: HOSPADM

## 2019-08-30 RX ORDER — SODIUM CHLORIDE 0.9 % (FLUSH) 0.9 %
10 SYRINGE (ML) INJECTION EVERY 12 HOURS SCHEDULED
Status: DISCONTINUED | OUTPATIENT
Start: 2019-08-30 | End: 2019-09-11 | Stop reason: HOSPADM

## 2019-08-30 RX ORDER — 0.9 % SODIUM CHLORIDE 0.9 %
250 INTRAVENOUS SOLUTION INTRAVENOUS ONCE
Status: COMPLETED | OUTPATIENT
Start: 2019-08-30 | End: 2019-08-30

## 2019-08-30 RX ORDER — SODIUM CHLORIDE 0.9 % (FLUSH) 0.9 %
10 SYRINGE (ML) INJECTION PRN
Status: DISCONTINUED | OUTPATIENT
Start: 2019-08-30 | End: 2019-09-11 | Stop reason: HOSPADM

## 2019-08-30 RX ORDER — ONDANSETRON 4 MG/1
4 TABLET, ORALLY DISINTEGRATING ORAL EVERY 6 HOURS PRN
Status: DISCONTINUED | OUTPATIENT
Start: 2019-08-30 | End: 2019-09-11 | Stop reason: HOSPADM

## 2019-08-30 RX ORDER — ATROPINE SULFATE 0.1 MG/ML
INJECTION INTRAVENOUS
Status: DISPENSED
Start: 2019-08-30 | End: 2019-08-30

## 2019-08-30 RX ORDER — SODIUM CHLORIDE 9 MG/ML
INJECTION, SOLUTION INTRAVENOUS CONTINUOUS
Status: ACTIVE | OUTPATIENT
Start: 2019-08-30 | End: 2019-08-30

## 2019-08-30 RX ORDER — ONDANSETRON 2 MG/ML
4 INJECTION INTRAMUSCULAR; INTRAVENOUS EVERY 6 HOURS PRN
Status: DISCONTINUED | OUTPATIENT
Start: 2019-08-30 | End: 2019-09-11 | Stop reason: HOSPADM

## 2019-08-30 RX ORDER — ONDANSETRON 2 MG/ML
4 INJECTION INTRAMUSCULAR; INTRAVENOUS EVERY 6 HOURS PRN
Status: DISCONTINUED | OUTPATIENT
Start: 2019-08-30 | End: 2019-08-30 | Stop reason: ALTCHOICE

## 2019-08-30 RX ADMIN — IPRATROPIUM BROMIDE AND ALBUTEROL SULFATE 3 ML: .5; 3 SOLUTION RESPIRATORY (INHALATION) at 14:44

## 2019-08-30 RX ADMIN — METOPROLOL TARTRATE 25 MG: 25 TABLET, FILM COATED ORAL at 20:52

## 2019-08-30 RX ADMIN — AMIODARONE HYDROCHLORIDE 200 MG: 200 TABLET ORAL at 09:37

## 2019-08-30 RX ADMIN — ACETAMINOPHEN 650 MG: 325 TABLET ORAL at 09:34

## 2019-08-30 RX ADMIN — LINAGLIPTIN 2.5 MG: 5 TABLET, FILM COATED ORAL at 09:35

## 2019-08-30 RX ADMIN — Medication 1 MG: at 20:52

## 2019-08-30 RX ADMIN — Medication 1 MG: at 03:32

## 2019-08-30 RX ADMIN — ATORVASTATIN CALCIUM 20 MG: 20 TABLET, FILM COATED ORAL at 20:51

## 2019-08-30 RX ADMIN — ROPINIROLE HYDROCHLORIDE 2 MG: 2 TABLET, FILM COATED ORAL at 13:11

## 2019-08-30 RX ADMIN — MAGNESIUM GLUCONATE 500 MG ORAL TABLET 400 MG: 500 TABLET ORAL at 09:37

## 2019-08-30 RX ADMIN — ASPIRIN 81 MG: 81 TABLET, COATED ORAL at 09:34

## 2019-08-30 RX ADMIN — ROPINIROLE HYDROCHLORIDE 2 MG: 2 TABLET, FILM COATED ORAL at 21:05

## 2019-08-30 RX ADMIN — SENNOSIDES 17.2 MG: 8.6 TABLET, FILM COATED ORAL at 20:52

## 2019-08-30 RX ADMIN — METOPROLOL TARTRATE 25 MG: 25 TABLET, FILM COATED ORAL at 09:34

## 2019-08-30 RX ADMIN — MAGNESIUM GLUCONATE 500 MG ORAL TABLET 400 MG: 500 TABLET ORAL at 20:51

## 2019-08-30 RX ADMIN — INSULIN LISPRO 1 UNITS: 100 INJECTION, SOLUTION INTRAVENOUS; SUBCUTANEOUS at 21:21

## 2019-08-30 RX ADMIN — PANTOPRAZOLE SODIUM 40 MG: 40 TABLET, DELAYED RELEASE ORAL at 16:24

## 2019-08-30 RX ADMIN — INSULIN LISPRO 1 UNITS: 100 INJECTION, SOLUTION INTRAVENOUS; SUBCUTANEOUS at 13:10

## 2019-08-30 RX ADMIN — CARBIDOPA AND LEVODOPA 1 TABLET: 25; 100 TABLET, EXTENDED RELEASE ORAL at 21:05

## 2019-08-30 RX ADMIN — SODIUM CHLORIDE 250 ML: 0.9 INJECTION, SOLUTION INTRAVENOUS at 17:15

## 2019-08-30 RX ADMIN — CARBIDOPA AND LEVODOPA 1 TABLET: 25; 100 TABLET, EXTENDED RELEASE ORAL at 13:10

## 2019-08-30 RX ADMIN — HEPARIN SODIUM 5000 UNITS: 5000 INJECTION INTRAVENOUS; SUBCUTANEOUS at 21:21

## 2019-08-30 RX ADMIN — CYCLOBENZAPRINE HYDROCHLORIDE 5 MG: 5 TABLET, FILM COATED ORAL at 16:40

## 2019-08-30 ASSESSMENT — PAIN SCALES - GENERAL
PAINLEVEL_OUTOF10: 8
PAINLEVEL_OUTOF10: 8
PAINLEVEL_OUTOF10: 7
PAINLEVEL_OUTOF10: 6

## 2019-08-30 NOTE — PROGRESS NOTES
Patient complaining of increased pain, RN noticed a raised area of the right groin, pressure was applied and RN asked for additional staff to come to the room. Md paged and cath lab paged about patients status.

## 2019-08-30 NOTE — PROGRESS NOTES
08/30/2019    BILITOT 0.43 06/05/2019    ALKPHOS 67 06/05/2019    AST 17 06/05/2019    ALT <5 06/05/2019        Assessment:  Patient Active Problem List   Diagnosis    Controlled type 2 diabetes mellitus with stage 3 chronic kidney disease, without long-term current use of insulin ([facility])    Hyperlipidemia    Essential hypertension    Chronic leg pain    Paroxysmal atrial fibrillation (HCC)    Asthma    Gastroesophageal reflux disease without esophagitis    ECTOR on CPAP    Type 2 diabetes mellitus with complication, without long-term current use of insulin ([facility])    Spinal stenosis in cervical region    Hypomagnesemia    Hyperphosphaturia    Hypocalcemia    Parkinson's disease ([address]    Acute renal failure ([address]    Acute cystitis with hematuria    Altered mental status    Iron deficiency anemia due to chronic blood loss    Morbid obesity with BMI of 40.0-44.9, adult ([facility])    Hyperplastic polyp of intestine    Diverticulosis    Panlobular emphysema ([facility])    Rapid atrial fibrillation ([facility])    CKD (chronic kidney disease) stage 3, GFR 30-59 ml/min ([facility])    KRISTIN (acute kidney injury) ([address]    Anemia in chronic kidney disease    Chronic respiratory failure with hypoxia and hypercapnia ([facility])    Acute kidney injury superimposed on chronic kidney disease ([address]    CKD stage 4 due to type 2 diabetes mellitus ([address]    E. coli UTI (urinary tract infection)    Nephrolithiasis    Candidal intertrigo    Venous insufficiency    Malabsorption    Anemia of chronic renal failure, stage 4 (severe) ([facility])    Iron deficiency    Coronary atherosclerosis    COPD exacerbation ([facility])    Restless leg syndrome    SIRS (systemic inflammatory response syndrome) ([facility])    Nausea and vomiting in adult    Bacterial UTI    Odynophagia    Esophageal dysphagia    Candida esophagitis ([facility])    Sepsis due to urinary tract infection ([facility])    Chronic respiratory failure with hypoxia ([facility])    Chronic diastolic

## 2019-08-30 NOTE — PROGRESS NOTES
GLUCOSE 115 (H) 08/30/2019    GLUCOSE 110 (H) 08/29/2019    GLUCOSE 113 (H) 08/28/2019     CMP:   Lab Results   Component Value Date     08/30/2019    K 4.9 08/30/2019     08/30/2019    CO2 27 08/30/2019    BUN 48 08/30/2019    CREATININE 2.25 08/30/2019    GLUCOSE 115 08/30/2019    GLUCOSE 132 03/07/2012    CALCIUM 8.5 08/30/2019    PROT 6.3 06/05/2019    LABALBU 3.2 06/05/2019    LABALBU Detected 11/30/2018    BILITOT 0.43 06/05/2019    ALKPHOS 67 06/05/2019    AST 17 06/05/2019    ALT <5 06/05/2019      Hepatic:   Lab Results   Component Value Date    AST 17 06/05/2019    AST 17 06/04/2019    AST 14 11/28/2018    ALT <5 (L) 06/05/2019    ALT <5 (L) 06/04/2019    ALT <5 (L) 11/28/2018    BILITOT 0.43 06/05/2019    BILITOT 0.52 06/04/2019    BILITOT 0.16 (L) 11/28/2018    ALKPHOS 67 06/05/2019    ALKPHOS 75 06/04/2019    ALKPHOS 42 11/28/2018     BNP:   Lab Results   Component Value Date    BNP 40 06/07/2013     (H) 05/31/2013     Lipids:   Lab Results   Component Value Date    CHOL 135 08/26/2017    HDL 30 (L) 08/26/2017     INR:   Lab Results   Component Value Date    INR 1.0 08/30/2019    INR 1.0 08/25/2019    INR 1.0 06/05/2019     PTH: No results found for: PTH  Phosphorus:    Lab Results   Component Value Date    PHOS 4.6 08/30/2019     Ionized Calcium: No results found for: IONCA  Magnesium:   Lab Results   Component Value Date    MG 1.7 08/30/2019     Albumin:   Lab Results   Component Value Date    LABALBU 3.2 06/05/2019    LABALBU Detected 11/30/2018     Last 3 CK, CKMB, Troponin: @LABRCNT(CKTOTAL:3,CKMB:3,TROPONINI:3)       URINE:)No results found for: Jarrett Baumannta    Radiology:   Reviewed. Assessment:  1. Acute Kidney Injury-multifactorial       A. Bactrim       B. Vasomotor  2. CKD 4  3. Hyperkalemia  4. HTN with recent hypotension  5. Abnormal stress test  6. Anemia of chronic disease    Plan:  Renal function close to baseline  On gentle hydration  Off Lasix.    Monitor

## 2019-08-31 LAB
ABSOLUTE EOS #: <0.03 K/UL (ref 0–0.44)
ABSOLUTE IMMATURE GRANULOCYTE: 0.23 K/UL (ref 0–0.3)
ABSOLUTE LYMPH #: 1.59 K/UL (ref 1.1–3.7)
ABSOLUTE MONO #: 1.42 K/UL (ref 0.1–1.2)
ANION GAP SERPL CALCULATED.3IONS-SCNC: 13 MMOL/L (ref 9–17)
BASOPHILS # BLD: 0 % (ref 0–2)
BASOPHILS ABSOLUTE: 0.03 K/UL (ref 0–0.2)
BUN BLDV-MCNC: 60 MG/DL (ref 8–23)
BUN/CREAT BLD: 25 (ref 9–20)
CALCIUM SERPL-MCNC: 7.8 MG/DL (ref 8.6–10.4)
CHLORIDE BLD-SCNC: 102 MMOL/L (ref 98–107)
CO2: 24 MMOL/L (ref 20–31)
CREAT SERPL-MCNC: 2.37 MG/DL (ref 0.5–0.9)
DIFFERENTIAL TYPE: ABNORMAL
EOSINOPHILS RELATIVE PERCENT: 0 % (ref 1–4)
GFR AFRICAN AMERICAN: 24 ML/MIN
GFR NON-AFRICAN AMERICAN: 20 ML/MIN
GFR SERPL CREATININE-BSD FRML MDRD: ABNORMAL ML/MIN/{1.73_M2}
GFR SERPL CREATININE-BSD FRML MDRD: ABNORMAL ML/MIN/{1.73_M2}
GLUCOSE BLD-MCNC: 141 MG/DL (ref 65–105)
GLUCOSE BLD-MCNC: 151 MG/DL (ref 70–99)
GLUCOSE BLD-MCNC: 207 MG/DL (ref 65–105)
GLUCOSE BLD-MCNC: 209 MG/DL (ref 65–105)
GLUCOSE BLD-MCNC: 99 MG/DL (ref 65–105)
HCT VFR BLD CALC: 25.9 % (ref 36.3–47.1)
HEMOGLOBIN: 7.9 G/DL (ref 11.9–15.1)
IMMATURE GRANULOCYTES: 2 %
LYMPHOCYTES # BLD: 11 % (ref 24–43)
MAGNESIUM: 1.8 MG/DL (ref 1.6–2.6)
MCH RBC QN AUTO: 31.7 PG (ref 25.2–33.5)
MCHC RBC AUTO-ENTMCNC: 30.5 G/DL (ref 28.4–34.8)
MCV RBC AUTO: 104 FL (ref 82.6–102.9)
MONOCYTES # BLD: 10 % (ref 3–12)
NRBC AUTOMATED: 0 PER 100 WBC
PDW BLD-RTO: 16.9 % (ref 11.8–14.4)
PHOSPHORUS: 5.4 MG/DL (ref 2.6–4.5)
PLATELET # BLD: 164 K/UL (ref 138–453)
PLATELET ESTIMATE: ABNORMAL
PMV BLD AUTO: 11.1 FL (ref 8.1–13.5)
POTASSIUM SERPL-SCNC: 5 MMOL/L (ref 3.7–5.3)
POTASSIUM SERPL-SCNC: 5.4 MMOL/L (ref 3.7–5.3)
RBC # BLD: 2.49 M/UL (ref 3.95–5.11)
RBC # BLD: ABNORMAL 10*6/UL
SEG NEUTROPHILS: 77 % (ref 36–65)
SEGMENTED NEUTROPHILS ABSOLUTE COUNT: 11.23 K/UL (ref 1.5–8.1)
SODIUM BLD-SCNC: 139 MMOL/L (ref 135–144)
WBC # BLD: 14.5 K/UL (ref 3.5–11.3)
WBC # BLD: ABNORMAL 10*3/UL

## 2019-08-31 PROCEDURE — 97530 THERAPEUTIC ACTIVITIES: CPT

## 2019-08-31 PROCEDURE — 97168 OT RE-EVAL EST PLAN CARE: CPT

## 2019-08-31 PROCEDURE — 97535 SELF CARE MNGMENT TRAINING: CPT

## 2019-08-31 PROCEDURE — 85025 COMPLETE CBC W/AUTO DIFF WBC: CPT

## 2019-08-31 PROCEDURE — 97110 THERAPEUTIC EXERCISES: CPT

## 2019-08-31 PROCEDURE — 6370000000 HC RX 637 (ALT 250 FOR IP): Performed by: INTERNAL MEDICINE

## 2019-08-31 PROCEDURE — 84132 ASSAY OF SERUM POTASSIUM: CPT

## 2019-08-31 PROCEDURE — 83735 ASSAY OF MAGNESIUM: CPT

## 2019-08-31 PROCEDURE — 6360000002 HC RX W HCPCS: Performed by: INTERNAL MEDICINE

## 2019-08-31 PROCEDURE — 2060000000 HC ICU INTERMEDIATE R&B

## 2019-08-31 PROCEDURE — 94640 AIRWAY INHALATION TREATMENT: CPT

## 2019-08-31 PROCEDURE — 6360000002 HC RX W HCPCS: Performed by: NURSE PRACTITIONER

## 2019-08-31 PROCEDURE — 82947 ASSAY GLUCOSE BLOOD QUANT: CPT

## 2019-08-31 PROCEDURE — 36415 COLL VENOUS BLD VENIPUNCTURE: CPT

## 2019-08-31 PROCEDURE — 97116 GAIT TRAINING THERAPY: CPT

## 2019-08-31 PROCEDURE — 93970 EXTREMITY STUDY: CPT

## 2019-08-31 PROCEDURE — 84100 ASSAY OF PHOSPHORUS: CPT

## 2019-08-31 PROCEDURE — 80048 BASIC METABOLIC PNL TOTAL CA: CPT

## 2019-08-31 PROCEDURE — 93005 ELECTROCARDIOGRAM TRACING: CPT | Performed by: INTERNAL MEDICINE

## 2019-08-31 RX ADMIN — ASPIRIN 81 MG: 81 TABLET, COATED ORAL at 08:45

## 2019-08-31 RX ADMIN — ATORVASTATIN CALCIUM 20 MG: 20 TABLET, FILM COATED ORAL at 20:08

## 2019-08-31 RX ADMIN — INSULIN LISPRO 2 UNITS: 100 INJECTION, SOLUTION INTRAVENOUS; SUBCUTANEOUS at 12:41

## 2019-08-31 RX ADMIN — ROPINIROLE HYDROCHLORIDE 2 MG: 2 TABLET, FILM COATED ORAL at 20:08

## 2019-08-31 RX ADMIN — HEPARIN SODIUM 5000 UNITS: 5000 INJECTION INTRAVENOUS; SUBCUTANEOUS at 20:38

## 2019-08-31 RX ADMIN — LINAGLIPTIN 2.5 MG: 5 TABLET, FILM COATED ORAL at 08:47

## 2019-08-31 RX ADMIN — CARBIDOPA AND LEVODOPA 1 TABLET: 25; 100 TABLET, EXTENDED RELEASE ORAL at 08:46

## 2019-08-31 RX ADMIN — HEPARIN SODIUM 5000 UNITS: 5000 INJECTION INTRAVENOUS; SUBCUTANEOUS at 05:41

## 2019-08-31 RX ADMIN — MAGNESIUM GLUCONATE 500 MG ORAL TABLET 400 MG: 500 TABLET ORAL at 08:48

## 2019-08-31 RX ADMIN — Medication 2 PUFF: at 19:22

## 2019-08-31 RX ADMIN — RASAGILINE 1 MG: 1 TABLET ORAL at 10:15

## 2019-08-31 RX ADMIN — Medication 1 MG: at 17:10

## 2019-08-31 RX ADMIN — CALCITRIOL 0.25 MCG: 0.25 CAPSULE ORAL at 08:46

## 2019-08-31 RX ADMIN — AMIODARONE HYDROCHLORIDE 200 MG: 200 TABLET ORAL at 08:45

## 2019-08-31 RX ADMIN — PANTOPRAZOLE SODIUM 40 MG: 40 TABLET, DELAYED RELEASE ORAL at 16:01

## 2019-08-31 RX ADMIN — CARBIDOPA AND LEVODOPA 1 TABLET: 25; 100 TABLET, EXTENDED RELEASE ORAL at 20:08

## 2019-08-31 RX ADMIN — MAGNESIUM GLUCONATE 500 MG ORAL TABLET 400 MG: 500 TABLET ORAL at 20:08

## 2019-08-31 RX ADMIN — METOPROLOL TARTRATE 25 MG: 25 TABLET, FILM COATED ORAL at 10:16

## 2019-08-31 RX ADMIN — SENNOSIDES 17.2 MG: 8.6 TABLET, FILM COATED ORAL at 20:08

## 2019-08-31 RX ADMIN — IPRATROPIUM BROMIDE AND ALBUTEROL SULFATE 3 ML: .5; 3 SOLUTION RESPIRATORY (INHALATION) at 19:20

## 2019-08-31 RX ADMIN — ROPINIROLE HYDROCHLORIDE 2 MG: 2 TABLET, FILM COATED ORAL at 13:55

## 2019-08-31 RX ADMIN — Medication 1 MG: at 09:02

## 2019-08-31 RX ADMIN — INSULIN LISPRO 1 UNITS: 100 INJECTION, SOLUTION INTRAVENOUS; SUBCUTANEOUS at 20:38

## 2019-08-31 RX ADMIN — IPRATROPIUM BROMIDE AND ALBUTEROL SULFATE 3 ML: .5; 3 SOLUTION RESPIRATORY (INHALATION) at 14:39

## 2019-08-31 RX ADMIN — PANTOPRAZOLE SODIUM 40 MG: 40 TABLET, DELAYED RELEASE ORAL at 07:48

## 2019-08-31 RX ADMIN — HEPARIN SODIUM 5000 UNITS: 5000 INJECTION INTRAVENOUS; SUBCUTANEOUS at 16:01

## 2019-08-31 RX ADMIN — CARBIDOPA AND LEVODOPA 1 TABLET: 25; 100 TABLET, EXTENDED RELEASE ORAL at 13:55

## 2019-08-31 RX ADMIN — METOPROLOL TARTRATE 25 MG: 25 TABLET, FILM COATED ORAL at 20:08

## 2019-08-31 RX ADMIN — ROPINIROLE HYDROCHLORIDE 2 MG: 2 TABLET, FILM COATED ORAL at 08:46

## 2019-08-31 ASSESSMENT — PAIN SCALES - GENERAL
PAINLEVEL_OUTOF10: 8
PAINLEVEL_OUTOF10: 7
PAINLEVEL_OUTOF10: 4
PAINLEVEL_OUTOF10: 6
PAINLEVEL_OUTOF10: 0
PAINLEVEL_OUTOF10: 5
PAINLEVEL_OUTOF10: 8
PAINLEVEL_OUTOF10: 10

## 2019-08-31 ASSESSMENT — PAIN DESCRIPTION - LOCATION
LOCATION: LEG

## 2019-08-31 ASSESSMENT — PAIN DESCRIPTION - PAIN TYPE
TYPE: ACUTE PAIN
TYPE: ACUTE PAIN

## 2019-08-31 ASSESSMENT — PAIN DESCRIPTION - ORIENTATION
ORIENTATION: RIGHT

## 2019-08-31 NOTE — PROGRESS NOTES
3/20/19   Lesly Hill, DO   ONETOUCH VERIO strip 1 each by In Vitro route daily As needed. 3/4/19   Lesly Hill, DO   ONE TOUCH ULTRA TEST strip USE 1 STRIP THREE TIMES A DAY 12/26/18   Lesly Hill DO   albuterol (ACCUNEB) 1.25 MG/3ML nebulizer solution Inhale 1 ampule into the lungs every 6 hours as needed for Wheezing or Shortness of Breath    Historical Provider, MD Socorro Sharma LANCETS 49G MISC 1 Units by Does not apply route 2 times daily 10/23/18   Lesly Hill DO   magnesium oxide (MAG-OX) 400 (241.3 Mg) MG TABS tablet Take 1 tablet by mouth 2 times daily 9/7/18   Renee Marcos DO   vitamin D (CHOLECALCIFEROL) 5000 units CAPS capsule Take 5,000 Units by mouth daily    Historical Provider, MD   Oxygen Concentrator Dx: Pneumonia, use as directed. Patient taking differently: Dx: Pneumonia, use as directed.    ON 24/7 2/10/17   Roslynbryn Hill, DO       Scheduled Meds:   sodium chloride flush  10 mL Intravenous 2 times per day    magnesium oxide  400 mg Oral BID    metoprolol tartrate  25 mg Oral BID    miconazole   Topical BID    ipratropium-albuterol  3 mL Inhalation TID    darbepoetin albaro-polysorbate  60 mcg Subcutaneous Weekly    calcitRIOL  0.25 mcg Oral Daily    cyanocobalamin  1,000 mcg Intramuscular Q30 Days    aspirin  324 mg Oral Once    amiodarone  200 mg Oral Daily    atorvastatin  20 mg Oral Daily    mometasone-formoterol  2 puff Inhalation BID    carbidopa-levodopa  1 tablet Oral TID    linagliptin  2.5 mg Oral Daily    senna  2 tablet Oral Nightly    rOPINIRole  2 mg Oral TID    insulin lispro  0-6 Units Subcutaneous TID WC    insulin lispro  0-3 Units Subcutaneous Nightly    heparin (porcine)  5,000 Units Subcutaneous 3 times per day    aspirin  81 mg Oral Daily    pantoprazole  40 mg Oral BID AC    rasagiline mesylate  1 mg Oral Daily     Continuous Infusions:   dextrose       PRN Meds:sodium chloride flush, acetaminophen, magnesium hydroxide, ondansetron **OR** ondansetron, morphine, glucose, dextrose, glucagon (rDNA), dextrose, albuterol, Melatonin, cyclobenzaprine    Physical Exam:  Vitals:    08/31/19 0400 08/31/19 0800 08/31/19 1015 08/31/19 1200   BP: 108/64 (!) 120/40 (!) 119/54 (!) 97/39   Pulse: 87 63 67 65   Resp: 16 18  16   Temp:  97.9 °F (36.6 °C)  97.9 °F (36.6 °C)   TempSrc:  Temporal  Temporal   SpO2: 99% 98%  95%   Weight:       Height:         I/O last 3 completed shifts: In: 292 [Blood:292]  Out: 400 [Urine:400]      General:  Awake, alert, not in distress. Appears to be stated age. HEENT: Atraumatic, normocephalic. Anicteric sclera. Pink and moist oral mucosa. Neck supple. No JVD. Chest: Bilateral air entry, clear to auscultation, diminished bilaterally, no wheezing, rhonchi or rales. Cardiovascular: RRR, S1S2, no murmur, rub or gallop. No lower extremity edema. Abdomen: Soft, non tender to palpation. Musculoskeletal:   No cyanosis or clubbing. Integumentary: Pink, warm and dry. Free from rash or lesions. Skin turgor normal.  CNS: Oriented to person, place and time. Cranial nerves grossly intact. Speech clear. Face symmetrical. No tremor.      Data:    CBC:   Lab Results   Component Value Date    WBC 14.5 (H) 08/31/2019    HGB 7.9 (L) 08/31/2019    HCT 25.9 (L) 08/31/2019    .0 (H) 08/31/2019     08/31/2019     BMP:    Lab Results   Component Value Date     08/31/2019     08/30/2019     08/29/2019    K 5.4 (H) 08/31/2019    K 4.9 08/30/2019    K 4.7 08/29/2019     08/31/2019     08/30/2019     08/29/2019    CO2 24 08/31/2019    CO2 27 08/30/2019    CO2 27 08/29/2019    BUN 60 (H) 08/31/2019    BUN 48 (H) 08/30/2019    BUN 48 (H) 08/29/2019    CREATININE 2.37 (H) 08/31/2019    CREATININE 2.25 (H) 08/30/2019    CREATININE 2.06 (H) 08/29/2019    GLUCOSE 151 (H) 08/31/2019    GLUCOSE 115 (H) 08/30/2019    GLUCOSE 110 (H) 08/29/2019     CMP:   Lab Results   Component Value Date     Please do not hesitate to contact us for any further questions/concerns. We will continue to follow along with you.      Electronically signed by Varsha Roldan MD  on 8/31/2019 at 4:18 PM

## 2019-08-31 NOTE — PROGRESS NOTES
Cardiovascular progress Note          Patient name: Guero Caceres    YOB: 1945  Date of admission:  8/25/2019       Patient seen, examined. Previous clinical entries reviewed. All available laboratory, imaging and ancillary data reviewed. Subjective:      Her groin ecchymosis. She had 1 unit of PRBC transfusion. R Groin US showed a pseudoaneurysm. Systems review:  Constitutional: No fever/chills. HENT: No headache, neck pain or neck stiffness. No sore throat or dysphagia. Gastrointestinal: No abdominal pain, nausea or vomiting. Cardiac: As Above  Respiratory: As above  Neurologic: No new focal weakness or numbness  Psychiatric: Normal mood and mentation       Examination:   Vitals: BP (!) 97/39   Pulse 65   Temp 97.9 °F (36.6 °C) (Temporal)   Resp 16   Ht 5' (1.524 m)   Wt 184 lb 6.4 oz (83.6 kg)   LMP 04/03/2004 (Within Years)   SpO2 95%   BMI 36.01 kg/m²     Intake/Output Summary (Last 24 hours) at 8/31/2019 1433  Last data filed at 8/31/2019 0810  Gross per 24 hour   Intake 292 ml   Output 400 ml   Net -108 ml       General appearance: Comfortable in no apparent distress. HEENT: +pallor. No icterus  Neck: Supple. Lungs:Generally decreased breath sounds. Heart: S1,S2  Abdomen: Soft  Extremities: No peripheral edema  Skin: No obvious rashes. Musculoskeletal: No obvious deformities. Neurologic: No focal deficits.      Labs/ Ancillary data:     CBC:   Recent Labs     08/30/19  0446 08/30/19  1050 08/31/19  0354   WBC 8.9 11.4* 14.5*   HGB 9.4* 7.8* 7.9*   * 128* 164     BMP:    Recent Labs     08/29/19  0634 08/30/19  0446 08/31/19  0354    141 139   K 4.7 4.9 5.4*    100 102   CO2 27 27 24   BUN 48* 48* 60*   CREATININE 2.06* 2.25* 2.37*   GLUCOSE 110* 115* 151*       Medications:   Scheduled Meds:   sodium chloride flush  10 mL Intravenous 2 times per day    magnesium oxide  400 mg Oral BID    metoprolol tartrate  25 mg Oral BID    miconazole

## 2019-08-31 NOTE — PROGRESS NOTES
assistance  Quality of Gait: unsteady gait, slow shuffled steps  Gait Deviations: Decreased step length; Increased NANCY; Slow Trena; Shuffles  Distance: 5ft  Comments: Pt having difficulty weight bearing and advancing R LE d/t incision in femoral from heart cath yesterday  Stairs/Curb  Stairs?: No     Balance  Posture: Good  Sitting - Static: Good  Sitting - Dynamic: Good  Standing - Static: Good;-  Standing - Dynamic: Fair  Exercises  Comments: Pt performed seated LE exercises at EOB x 20 reps         Other Activities: Other (see comment)(Sit to stand and static standing at side chair to assist with boris care)        AM-PAC Score     AM-PAC Inpatient Mobility without Stair Climbing Raw Score : 13 (08/31/19 1058)  AM-PAC Inpatient without Stair Climbing T-Scale Score : 38.96 (08/31/19 1058)  Mobility Inpatient CMS 0-100% Score: 58.44 (08/31/19 1058)  Mobility Inpatient without Stair CMS G-Code Modifier : CK (08/31/19 1058)       Goals  Short term goals  Time Frame for Short term goals: 12 visits  Short term goal 1: Pt to complete bed mobility and transfers with CGA-min assist or better to increase pt independence and aid in return to PLOF. Short term goal 2: Pt to ambulate 150 ft with R/walker and SBA assist or better to return pt to PLOF. Short term goal 3: Pt to go up and down 2 steps with supervision to aid in return home following D/C  Short term goal 4: Pt to increase strength to 4+/5 overall to increase pt stability and decrease risk of falls. Short term goal 5: educate pt on bed mobility, transfers, balance, gait, saftey, and exercise and issue written HEP.      Plan    Plan  Times per week: 1-2x/D, 5-6D/wk  Current Treatment Recommendations: Strengthening, Gait Training, Stair training, Balance Training, Endurance Training, Functional Mobility Training, Home Exercise Program, Transfer Training, Safety Education & Training, Positioning  Safety Devices  Type of devices: Nurse notified, Left in chair, Gait

## 2019-08-31 NOTE — FLOWSHEET NOTE
08/31/19 1315   Provider Notification   Reason for Communication Review case   Provider Name Dr. Tessa Mondragon   Provider Notification Physician   Method of Communication Face to face   Response In department   Notification Time [phone]   MD rounded. New orders received for bilateral venous doppler. MD to place order. See new orders. Will continue to monitor.

## 2019-08-31 NOTE — FLOWSHEET NOTE
08/31/19 1630   Provider Notification   Reason for Communication Review case   Provider Name Dr. Sandro Gtz   Provider Notification Physician   Method of Communication Face to face   Response At bedside   Notification Time [facility]   MD rounded. New orders placed to re-evaluate potassium. See order set. Will continue to monitor.

## 2019-08-31 NOTE — FLOWSHEET NOTE
08/31/19 1500   Provider Notification   Reason for Communication Review case   Provider Name Marli   Provider Notification Nurse Practitioner   Method of Communication Face to face   Response At bedside   Notification Time 1500   NP rounded. Updated on pt status. No new orders. Will continue to monitor.

## 2019-08-31 NOTE — PROGRESS NOTES
pain   Pain Location Leg   Pain Orientation Right   ADL   Feeding Independent   Grooming Stand by assistance   UE Bathing Moderate assistance   LE Bathing Maximum assistance   UE Dressing Moderate assistance   LE Dressing Maximum assistance   Toileting Moderate assistance   Coordination   Movements Are Fluid And Coordinated Yes   Quality of Movement Other   Comment premorbid R shoulder deficit from fx in May   Functional Activity Tolerance   Functional Activity Tolerance Tolerates < 10 Min exercise, no significant change in vital signs   Balance   Sitting Balance Supervision   Standing Balance Minimal assistance   Functional Mobility   Functional - Mobility Device Other  (hand held A with use of chair and [facility] arms )   Activity   (chair to/from [facility])   Assist Level Minimal assistance   Transfers   Stand Step Transfers Minimal assistance   Sit to stand Minimal assistance   Stand to sit Minimal assistance   Toilet Transfers   Toilet - Technique Ambulating   Toilet Transfer Minimal assistance   Cognition   Overall Cognitive Status WFL  (lethargic at end of session)   Type of ROM/Therapeutic Exercise   Type of ROM/Therapeutic Exercise AROM;PROM   Comment LUE WNL, R shoulder premorbidly limited ~ 0-20 AROM and ~ 0-40 PROM   LUE Strength   Gross LUE Strength WFL   L Hand General 4/5   RUE Strength   Gross RUE Strength Exceptions to [facility]  (shoulder 2-, premorbid)   R Hand General 4/5   Hand Dominance   Hand Dominance Right   Short term goals   Time Frame for Short term goals 10 sessions   Short term goal 1 Pt will demo mod I with grooming, UB bathing/dressing, and toileting and max A with LB bathing and dressing   Short term goal 2 Pt will increase R shoulder AROM/PROM at least 20 degrees to increase I with ADLs   Short term goal 3 Pt will demo mod I and safety with bed mobility, transfers, and functional mobility   Short term goal 4 Pt will demo I with attention, safety awareness, sequencing, and problem solving   Short term

## 2019-09-01 ENCOUNTER — APPOINTMENT (OUTPATIENT)
Dept: GENERAL RADIOLOGY | Age: 74
DRG: 287 | End: 2019-09-01
Payer: COMMERCIAL

## 2019-09-01 LAB
ABSOLUTE EOS #: 0.12 K/UL (ref 0–0.4)
ABSOLUTE IMMATURE GRANULOCYTE: 0.12 K/UL (ref 0–0.3)
ABSOLUTE LYMPH #: 1.89 K/UL (ref 1–4.8)
ABSOLUTE MONO #: 1.18 K/UL (ref 0.2–0.8)
ABSOLUTE RETIC #: 0.06 M/UL (ref 0.03–0.08)
ANION GAP SERPL CALCULATED.3IONS-SCNC: 11 MMOL/L (ref 9–17)
BASOPHILS # BLD: 0 %
BASOPHILS ABSOLUTE: 0 K/UL (ref 0–0.2)
BILIRUB SERPL-MCNC: 0.16 MG/DL (ref 0.3–1.2)
BILIRUBIN DIRECT: <0.08 MG/DL
BUN BLDV-MCNC: 63 MG/DL (ref 8–23)
BUN/CREAT BLD: 25 (ref 9–20)
CALCIUM SERPL-MCNC: 7.7 MG/DL (ref 8.6–10.4)
CHLORIDE BLD-SCNC: 102 MMOL/L (ref 98–107)
CO2: 26 MMOL/L (ref 20–31)
CREAT SERPL-MCNC: 2.52 MG/DL (ref 0.5–0.9)
DIFFERENTIAL TYPE: ABNORMAL
EOSINOPHILS RELATIVE PERCENT: 1 % (ref 1–4)
FERRITIN: 226 UG/L (ref 13–150)
FOLATE: >20 NG/ML
FREE KAPPA/LAMBDA RATIO: 1.53 (ref 0.26–1.65)
GFR AFRICAN AMERICAN: 23 ML/MIN
GFR NON-AFRICAN AMERICAN: 19 ML/MIN
GFR SERPL CREATININE-BSD FRML MDRD: ABNORMAL ML/MIN/{1.73_M2}
GFR SERPL CREATININE-BSD FRML MDRD: ABNORMAL ML/MIN/{1.73_M2}
GLUCOSE BLD-MCNC: 113 MG/DL (ref 70–99)
GLUCOSE BLD-MCNC: 119 MG/DL (ref 65–105)
GLUCOSE BLD-MCNC: 143 MG/DL (ref 65–105)
GLUCOSE BLD-MCNC: 154 MG/DL (ref 65–105)
GLUCOSE BLD-MCNC: 202 MG/DL (ref 65–105)
HAPTOGLOBIN: 205 MG/DL (ref 30–200)
HCT VFR BLD CALC: 17.6 % (ref 36.3–47.1)
HCT VFR BLD CALC: 17.8 % (ref 36.3–47.1)
HCT VFR BLD CALC: 20.5 % (ref 36.3–47.1)
HCT VFR BLD CALC: 23.9 % (ref 36.3–47.1)
HEMOGLOBIN: 5.3 G/DL (ref 11.9–15.1)
HEMOGLOBIN: 5.5 G/DL (ref 11.9–15.1)
HEMOGLOBIN: 6.3 G/DL (ref 11.9–15.1)
HEMOGLOBIN: 7.3 G/DL (ref 11.9–15.1)
IMMATURE GRANULOCYTES: 1 %
IMMATURE RETIC FRACT: 25.1 % (ref 2.7–18.3)
INR BLD: 0.9
IRON SATURATION: 13 % (ref 20–55)
IRON: 26 UG/DL (ref 37–145)
KAPPA FREE LIGHT CHAINS QNT: 3.19 MG/DL (ref 0.37–1.94)
LACTATE DEHYDROGENASE: 151 U/L (ref 135–214)
LACTIC ACID: 0.9 MMOL/L (ref 0.5–2.2)
LAMBDA FREE LIGHT CHAINS QNT: 2.09 MG/DL (ref 0.57–2.63)
LYMPHOCYTES # BLD: 16 % (ref 24–44)
MAGNESIUM: 1.8 MG/DL (ref 1.6–2.6)
MCH RBC QN AUTO: 32 PG (ref 25.2–33.5)
MCHC RBC AUTO-ENTMCNC: 30.9 G/DL (ref 28.4–34.8)
MCV RBC AUTO: 103.5 FL (ref 82.6–102.9)
MONOCYTES # BLD: 10 % (ref 1–7)
MORPHOLOGY: ABNORMAL
MORPHOLOGY: ABNORMAL
NRBC AUTOMATED: 0 PER 100 WBC
PARTIAL THROMBOPLASTIN TIME: 22.4 SEC (ref 23–31)
PDW BLD-RTO: 16.8 % (ref 11.8–14.4)
PHOSPHORUS: 4.2 MG/DL (ref 2.6–4.5)
PLATELET # BLD: 118 K/UL (ref 138–453)
PLATELET ESTIMATE: ABNORMAL
PMV BLD AUTO: 10.3 FL (ref 8.1–13.5)
POTASSIUM SERPL-SCNC: 4.9 MMOL/L (ref 3.7–5.3)
PROCALCITONIN: 0.35 NG/ML
PROTHROMBIN TIME: 9.6 SEC (ref 9.7–11.6)
RBC # BLD: 1.72 M/UL (ref 3.95–5.11)
RBC # BLD: ABNORMAL 10*6/UL
RETIC %: 3 % (ref 0.5–1.9)
RETIC HEMOGLOBIN: 32.7 PG (ref 28.2–35.7)
SEG NEUTROPHILS: 72 % (ref 36–66)
SEGMENTED NEUTROPHILS ABSOLUTE COUNT: 8.49 K/UL (ref 1.8–7.7)
SODIUM BLD-SCNC: 139 MMOL/L (ref 135–144)
TOTAL IRON BINDING CAPACITY: 201 UG/DL (ref 250–450)
UNSATURATED IRON BINDING CAPACITY: 175 UG/DL (ref 112–347)
VITAMIN B-12: >2000 PG/ML (ref 232–1245)
WBC # BLD: 11.8 K/UL (ref 3.5–11.3)
WBC # BLD: ABNORMAL 10*3/UL

## 2019-09-01 PROCEDURE — 82607 VITAMIN B-12: CPT

## 2019-09-01 PROCEDURE — 6360000002 HC RX W HCPCS: Performed by: NURSE PRACTITIONER

## 2019-09-01 PROCEDURE — 85014 HEMATOCRIT: CPT

## 2019-09-01 PROCEDURE — P9016 RBC LEUKOCYTES REDUCED: HCPCS

## 2019-09-01 PROCEDURE — 82728 ASSAY OF FERRITIN: CPT

## 2019-09-01 PROCEDURE — 94640 AIRWAY INHALATION TREATMENT: CPT

## 2019-09-01 PROCEDURE — 86900 BLOOD TYPING SEROLOGIC ABO: CPT

## 2019-09-01 PROCEDURE — 2580000003 HC RX 258: Performed by: INTERNAL MEDICINE

## 2019-09-01 PROCEDURE — 36415 COLL VENOUS BLD VENIPUNCTURE: CPT

## 2019-09-01 PROCEDURE — 82947 ASSAY GLUCOSE BLOOD QUANT: CPT

## 2019-09-01 PROCEDURE — 84100 ASSAY OF PHOSPHORUS: CPT

## 2019-09-01 PROCEDURE — 2060000000 HC ICU INTERMEDIATE R&B

## 2019-09-01 PROCEDURE — 6370000000 HC RX 637 (ALT 250 FOR IP): Performed by: INTERNAL MEDICINE

## 2019-09-01 PROCEDURE — 83883 ASSAY NEPHELOMETRY NOT SPEC: CPT

## 2019-09-01 PROCEDURE — 82746 ASSAY OF FOLIC ACID SERUM: CPT

## 2019-09-01 PROCEDURE — 84145 PROCALCITONIN (PCT): CPT

## 2019-09-01 PROCEDURE — 2700000000 HC OXYGEN THERAPY PER DAY

## 2019-09-01 PROCEDURE — 82248 BILIRUBIN DIRECT: CPT

## 2019-09-01 PROCEDURE — 85045 AUTOMATED RETICULOCYTE COUNT: CPT

## 2019-09-01 PROCEDURE — 83550 IRON BINDING TEST: CPT

## 2019-09-01 PROCEDURE — 6360000002 HC RX W HCPCS: Performed by: INTERNAL MEDICINE

## 2019-09-01 PROCEDURE — 36430 TRANSFUSION BLD/BLD COMPNT: CPT

## 2019-09-01 PROCEDURE — 71045 X-RAY EXAM CHEST 1 VIEW: CPT

## 2019-09-01 PROCEDURE — 83540 ASSAY OF IRON: CPT

## 2019-09-01 PROCEDURE — 83615 LACTATE (LD) (LDH) ENZYME: CPT

## 2019-09-01 PROCEDURE — 83735 ASSAY OF MAGNESIUM: CPT

## 2019-09-01 PROCEDURE — 85018 HEMOGLOBIN: CPT

## 2019-09-01 PROCEDURE — 99255 IP/OBS CONSLTJ NEW/EST HI 80: CPT | Performed by: INTERNAL MEDICINE

## 2019-09-01 PROCEDURE — 85025 COMPLETE CBC W/AUTO DIFF WBC: CPT

## 2019-09-01 PROCEDURE — 85610 PROTHROMBIN TIME: CPT

## 2019-09-01 PROCEDURE — 83010 ASSAY OF HAPTOGLOBIN QUANT: CPT

## 2019-09-01 PROCEDURE — 97110 THERAPEUTIC EXERCISES: CPT

## 2019-09-01 PROCEDURE — 85730 THROMBOPLASTIN TIME PARTIAL: CPT

## 2019-09-01 PROCEDURE — 86334 IMMUNOFIX E-PHORESIS SERUM: CPT

## 2019-09-01 PROCEDURE — 80048 BASIC METABOLIC PNL TOTAL CA: CPT

## 2019-09-01 PROCEDURE — 83605 ASSAY OF LACTIC ACID: CPT

## 2019-09-01 PROCEDURE — 82247 BILIRUBIN TOTAL: CPT

## 2019-09-01 RX ORDER — 0.9 % SODIUM CHLORIDE 0.9 %
250 INTRAVENOUS SOLUTION INTRAVENOUS ONCE
Status: DISCONTINUED | OUTPATIENT
Start: 2019-09-01 | End: 2019-09-11 | Stop reason: HOSPADM

## 2019-09-01 RX ORDER — 0.9 % SODIUM CHLORIDE 0.9 %
250 INTRAVENOUS SOLUTION INTRAVENOUS ONCE
Status: COMPLETED | OUTPATIENT
Start: 2019-09-01 | End: 2019-09-01

## 2019-09-01 RX ORDER — HYDROCODONE BITARTRATE AND ACETAMINOPHEN 5; 325 MG/1; MG/1
1 TABLET ORAL EVERY 6 HOURS PRN
Status: DISCONTINUED | OUTPATIENT
Start: 2019-09-01 | End: 2019-09-10

## 2019-09-01 RX ADMIN — CARBIDOPA AND LEVODOPA 1 TABLET: 25; 100 TABLET, EXTENDED RELEASE ORAL at 21:50

## 2019-09-01 RX ADMIN — CALCITRIOL 0.25 MCG: 0.25 CAPSULE ORAL at 08:09

## 2019-09-01 RX ADMIN — METOPROLOL TARTRATE 25 MG: 25 TABLET, FILM COATED ORAL at 21:50

## 2019-09-01 RX ADMIN — ATORVASTATIN CALCIUM 20 MG: 20 TABLET, FILM COATED ORAL at 21:50

## 2019-09-01 RX ADMIN — PANTOPRAZOLE SODIUM 40 MG: 40 TABLET, DELAYED RELEASE ORAL at 16:23

## 2019-09-01 RX ADMIN — SENNOSIDES 17.2 MG: 8.6 TABLET, FILM COATED ORAL at 21:50

## 2019-09-01 RX ADMIN — ACETAMINOPHEN 650 MG: 325 TABLET ORAL at 16:13

## 2019-09-01 RX ADMIN — AMIODARONE HYDROCHLORIDE 200 MG: 200 TABLET ORAL at 08:09

## 2019-09-01 RX ADMIN — IPRATROPIUM BROMIDE AND ALBUTEROL SULFATE 3 ML: .5; 3 SOLUTION RESPIRATORY (INHALATION) at 09:09

## 2019-09-01 RX ADMIN — SODIUM CHLORIDE 250 ML: 0.9 INJECTION, SOLUTION INTRAVENOUS at 18:00

## 2019-09-01 RX ADMIN — METOPROLOL TARTRATE 25 MG: 25 TABLET, FILM COATED ORAL at 08:09

## 2019-09-01 RX ADMIN — Medication 5 MG: at 21:50

## 2019-09-01 RX ADMIN — HYDROCODONE BITARTRATE AND ACETAMINOPHEN 1 TABLET: 5; 325 TABLET ORAL at 20:28

## 2019-09-01 RX ADMIN — Medication 1 MG: at 08:09

## 2019-09-01 RX ADMIN — INSULIN LISPRO 1 UNITS: 100 INJECTION, SOLUTION INTRAVENOUS; SUBCUTANEOUS at 20:28

## 2019-09-01 RX ADMIN — HEPARIN SODIUM 5000 UNITS: 5000 INJECTION INTRAVENOUS; SUBCUTANEOUS at 22:19

## 2019-09-01 RX ADMIN — ROPINIROLE HYDROCHLORIDE 2 MG: 2 TABLET, FILM COATED ORAL at 08:09

## 2019-09-01 RX ADMIN — CARBIDOPA AND LEVODOPA 1 TABLET: 25; 100 TABLET, EXTENDED RELEASE ORAL at 16:13

## 2019-09-01 RX ADMIN — Medication 1 MG: at 16:14

## 2019-09-01 RX ADMIN — IPRATROPIUM BROMIDE AND ALBUTEROL SULFATE 3 ML: .5; 3 SOLUTION RESPIRATORY (INHALATION) at 14:03

## 2019-09-01 RX ADMIN — ONDANSETRON 4 MG: 2 INJECTION INTRAMUSCULAR; INTRAVENOUS at 19:40

## 2019-09-01 RX ADMIN — HEPARIN SODIUM 5000 UNITS: 5000 INJECTION INTRAVENOUS; SUBCUTANEOUS at 05:11

## 2019-09-01 RX ADMIN — CARBIDOPA AND LEVODOPA 1 TABLET: 25; 100 TABLET, EXTENDED RELEASE ORAL at 08:09

## 2019-09-01 RX ADMIN — HEPARIN SODIUM 5000 UNITS: 5000 INJECTION INTRAVENOUS; SUBCUTANEOUS at 16:14

## 2019-09-01 RX ADMIN — Medication 2 PUFF: at 09:11

## 2019-09-01 RX ADMIN — LINAGLIPTIN 2.5 MG: 5 TABLET, FILM COATED ORAL at 08:09

## 2019-09-01 RX ADMIN — Medication 1 MG: at 03:28

## 2019-09-01 RX ADMIN — ROPINIROLE HYDROCHLORIDE 2 MG: 2 TABLET, FILM COATED ORAL at 16:13

## 2019-09-01 RX ADMIN — RASAGILINE 1 MG: 1 TABLET ORAL at 16:23

## 2019-09-01 RX ADMIN — ASPIRIN 81 MG: 81 TABLET, COATED ORAL at 08:09

## 2019-09-01 RX ADMIN — ROPINIROLE HYDROCHLORIDE 2 MG: 2 TABLET, FILM COATED ORAL at 21:50

## 2019-09-01 ASSESSMENT — PAIN SCALES - GENERAL
PAINLEVEL_OUTOF10: 8
PAINLEVEL_OUTOF10: 8
PAINLEVEL_OUTOF10: 7
PAINLEVEL_OUTOF10: 7
PAINLEVEL_OUTOF10: 6
PAINLEVEL_OUTOF10: 9

## 2019-09-01 ASSESSMENT — PAIN DESCRIPTION - PAIN TYPE: TYPE: SURGICAL PAIN

## 2019-09-01 ASSESSMENT — PAIN DESCRIPTION - DESCRIPTORS: DESCRIPTORS: THROBBING

## 2019-09-01 ASSESSMENT — PAIN DESCRIPTION - ORIENTATION
ORIENTATION: RIGHT
ORIENTATION: RIGHT

## 2019-09-01 ASSESSMENT — PAIN DESCRIPTION - LOCATION
LOCATION: GROIN;LEG
LOCATION: LEG

## 2019-09-01 ASSESSMENT — PAIN DESCRIPTION - PROGRESSION: CLINICAL_PROGRESSION: NOT CHANGED

## 2019-09-01 ASSESSMENT — PAIN DESCRIPTION - FREQUENCY: FREQUENCY: INTERMITTENT

## 2019-09-01 ASSESSMENT — PAIN DESCRIPTION - ONSET: ONSET: ON-GOING

## 2019-09-01 NOTE — CONSULTS
mellitus, type 2) ([facility] [address] Full dentures, GERD (gastroesophageal reflux disease), H/O fall, Headache(784.0), [place] (hard of hearing), Hyperlipidemia, Hyperplastic polyp of intestine, Hypertension, Impaired ambulation, Kidney stone, Living in nursing home, Morbid obesity with BMI of 40.0-44.9, adult ([facility] [address] Muscle weakness, Open wound, ECTOR on CPAP, Osteoarthritis, Parkinson disease ([facility] [address] Restless leg syndrome, Spinal stenosis in cervical region, Type II or unspecified type diabetes mellitus without mention of complication, not stated as uncontrolled, Urethral caruncle, Wears glasses, and Wears glasses. Past Surgical History:   has a past surgical history that includes Cervical disc surgery (2012); Appendectomy; Tonsillectomy and adenoidectomy (1953); hernia repair (1999); eye surgery (Bilateral, 2017); Cervical spine surgery (5/31/13); laminectomy (05/31/2013); Upper gastrointestinal endoscopy (12/28/2016); Colonoscopy (2009); Colonoscopy (12/29/2016); Colonoscopy (12/30/2016); Colonoscopy (04/25/2017); pr colsc flx w/removal lesion by hot bx forceps (N/A, 4/25/2017); Cystorrhaphy (04/04/2018); pr cystourethroscopy,biopsies (N/A, 4/4/2018); pr [facility] incl fluor gdnce dx w/cell washg spx (N/A, 6/5/2018); Upper gastrointestinal endoscopy (N/A, 8/29/2018); shoulder fracture surgery (Right, 5/28/2019); Hardware Removal (Right, 07/05/2019); and Hardware Removal (Right, 7/5/2019). Medications:    Prior to Admission medications    Medication Sig Start Date End Date Taking?  Authorizing Provider   darbepoetin albaro-polysorbate (ARANESP) 200 MCG/0.4ML SOSY injection Inject 200 mcg into the skin every 28 days   Yes Historical Provider, MD   cyanocobalamin 1000 MCG/ML injection Inject 1,000 mcg into the muscle every 30 days   Yes Historical Provider, MD   sulfamethoxazole-trimethoprim (BACTRIM DS) 800-160 MG per tablet Take 1 tablet by mouth 2 times daily   Yes Historical Provider, MD   melatonin 5 MG TABS tablet Take 5 mg by and hematochezia   Genitourinary: negative for frequency, dysuria, nocturia, urinary incontinence, and hematuria   Integument: negative for rash, positive for right groin hematoma and bruise  Hematologic/Lymphatic: negative for easy bruising, bleeding, lymphadenopathy, or petechiae   Endocrine: negative for heat or cold intolerance,weight changes, change in bowel habits and hair loss   Musculoskeletal: negative for myalgias, arthralgias, pain, joint swelling,and bone pain   Neurological: negative for headaches, dizziness, seizures, weakness, numbness    PHYSICAL EXAM:        BP (!) 86/27   Pulse 57   Temp 98.7 °F (37.1 °C) (Temporal)   Resp 14   Ht 5' (1.524 m)   Wt 184 lb 14.4 oz (83.9 kg)   LMP 2004 (Within Years)   SpO2 97%   BMI 36.11 kg/m²    Temp (24hrs), Av.4 °F (36.9 °C), Min:97.8 °F (36.6 °C), Max:98.9 °F (37.2 °C)      General appearance -frail appearing, no in pain or distress   Mental status - alert and cooperative   Eyes - pupils equal and reactive, extraocular eye movements intact   Ears - bilateral TM's and external ear canals normal   Mouth - mucous membranes moist, pharynx normal without lesions   Neck - supple, no significant adenopathy   Lymphatics - no palpable lymphadenopathy, no hepatosplenomegaly   Chest - clear to auscultation, no wheezes, rales or rhonchi, symmetric air entry   Heart - normal rate, regular rhythm, normal S1, S2, no murmurs  Abdomen - soft, nontender, nondistended, no masses or organomegaly   Neurological - alert, oriented, normal speech, no focal findings or movement disorder noted   Musculoskeletal - no joint tenderness, deformity or swelling   Extremities - peripheral pulses normal, no pedal edema, no clubbing or cyanosis   Skin -ecchymosis involving right groin area with associated tenderness      DATA:      Labs:     CBC:   Recent Labs     19  0354 19  0445 19  0559   WBC 14.5* 11.8*  --    HGB 7.9* 5.5* 5.3*   HCT 25.9* 17.8* 17.6* Absolute Eos # 0.12 0.00 - 0.44 k/uL    Basophils Absolute 0.03 0.00 - 0.20 k/uL    Absolute Immature Granulocyte 0.06 0.00 - 0.30 k/uL    WBC Morphology NOT REPORTED     RBC Morphology ANISOCYTOSIS PRESENT     Platelet Estimate NOT REPORTED    TROP/MYOGLOBIN   Result Value Ref Range    Troponin, High Sensitivity 34 (H) 0 - 14 ng/L    Troponin T NOT REPORTED <0.03 ng/mL    Troponin Interp NOT REPORTED     Myoglobin 105 (H) 25 - 58 ng/mL   TROP/MYOGLOBIN   Result Value Ref Range    Troponin, High Sensitivity 36 (H) 0 - 14 ng/L    Troponin T NOT REPORTED <0.03 ng/mL    Troponin Interp NOT REPORTED     Myoglobin 109 (H) 25 - 58 ng/mL   APTT   Result Value Ref Range    PTT 26.5 23 - 31 sec   Protime-INR   Result Value Ref Range    Protime 10.0 9.7 - 11.6 sec    INR 1.0    Trop/Myoglobin   Result Value Ref Range    Troponin, High Sensitivity 37 (H) 0 - 14 ng/L    Troponin T NOT REPORTED <0.03 ng/mL    Troponin Interp NOT REPORTED     Myoglobin 100 (H) 25 - 58 ng/mL   Trop/Myoglobin   Result Value Ref Range    Troponin, High Sensitivity 37 (H) 0 - 14 ng/L    Troponin T NOT REPORTED <0.03 ng/mL    Troponin Interp NOT REPORTED     Myoglobin 102 (H) 25 - 58 ng/mL   Trop/Myoglobin   Result Value Ref Range    Troponin, High Sensitivity 35 (H) 0 - 14 ng/L    Troponin T NOT REPORTED <0.03 ng/mL    Troponin Interp NOT REPORTED     Myoglobin 96 (H) 25 - 58 ng/mL   Urinalysis Reflex to Culture   Result Value Ref Range    Color, UA YELLOW YELLOW    Turbidity UA CLOUDY (A) CLEAR    Glucose, Ur NEGATIVE NEGATIVE    Bilirubin Urine NEGATIVE NEGATIVE    Ketones, Urine NEGATIVE NEGATIVE    Specific Gravity, UA 1.015 1.005 - 1.030    Urine Hgb 3+ (A) NEGATIVE    pH, UA 7.0 5.0 - 8.0    Protein, UA NEGATIVE NEGATIVE    Urobilinogen, Urine Normal Normal    Nitrite, Urine NEGATIVE NEGATIVE    Leukocyte Esterase, Urine SMALL (A) NEGATIVE    Urinalysis Comments NOT REPORTED    Hemoglobin A1c   Result Value Ref Range    Hemoglobin A1C 5.8 91 65 - 105 mg/dL   POC Glucose Fingerstick   Result Value Ref Range    POC Glucose 109 (H) 65 - 105 mg/dL   POC Glucose Fingerstick   Result Value Ref Range    POC Glucose 116 (H) 65 - 105 mg/dL   POC Glucose Fingerstick   Result Value Ref Range    POC Glucose 171 (H) 65 - 105 mg/dL   POC Glucose Fingerstick   Result Value Ref Range    POC Glucose 124 (H) 65 - 105 mg/dL   POC Glucose Fingerstick   Result Value Ref Range    POC Glucose 131 (H) 65 - 105 mg/dL   POC Glucose Fingerstick   Result Value Ref Range    POC Glucose 100 65 - 105 mg/dL   POC Glucose Fingerstick   Result Value Ref Range    POC Glucose 201 (H) 65 - 105 mg/dL   POC Glucose Fingerstick   Result Value Ref Range    POC Glucose 90 65 - 105 mg/dL   POC Glucose Fingerstick   Result Value Ref Range    POC Glucose 122 (H) 65 - 105 mg/dL   POC Glucose Fingerstick   Result Value Ref Range    POC Glucose 106 (H) 65 - 105 mg/dL   POC Glucose Fingerstick   Result Value Ref Range    POC Glucose 132 (H) 65 - 105 mg/dL   POC Glucose Fingerstick   Result Value Ref Range    POC Glucose 143 (H) 65 - 105 mg/dL   POC Glucose Fingerstick   Result Value Ref Range    POC Glucose 144 (H) 65 - 105 mg/dL   POC Glucose Fingerstick   Result Value Ref Range    POC Glucose 149 (H) 65 - 105 mg/dL   POC Glucose Fingerstick   Result Value Ref Range    POC Glucose 125 (H) 65 - 105 mg/dL   POC Glucose Fingerstick   Result Value Ref Range    POC Glucose 92 65 - 105 mg/dL   POC Glucose Fingerstick   Result Value Ref Range    POC Glucose 139 (H) 65 - 105 mg/dL   POC Glucose Fingerstick   Result Value Ref Range    POC Glucose 153 (H) 65 - 105 mg/dL   POC Glucose Fingerstick   Result Value Ref Range    POC Glucose 182 (H) 65 - 105 mg/dL   POC Glucose Fingerstick   Result Value Ref Range    POC Glucose 141 (H) 65 - 105 mg/dL   POC Glucose Fingerstick   Result Value Ref Range    POC Glucose 209 (H) 65 - 105 mg/dL   POC Glucose Fingerstick   Result Value Ref Range    POC Glucose 99 65 - 176 pounds, 79.8 kg   Patient Acct # [de-identified]   BSA:        1.76 m^2    BMI:       34.55 kg/m^2   MR #           [id number]     Deena Aguilera   Accession #    [id number]   Interpreting Physician  Ellis Wolfe   Referring                  Referring Physician     Wilder Baumann,  Nurse  Practitioner  Procedure Type of Study:   Veins: Lower Extremities DVT Study, Venous Scan Lower Bilateral.  Patient Status: In Patient. Technical Quality:Limited visualization. Limitation reason:Right lower extremity edema. - Critical Result:LILLY Chase informed of critical findings in right     lower extremity. Conclusions   Summary   RIGHT:  No evidence of superficial or deep venous thrombosis in the lower  extremity. Edema noted. 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 LEFT:  No evidence of superficial or deep venous thrombosis in the lower  extremity. Signature   ----------------------------------------------------------------  Electronically signed by Jossy Ordaz(Sonographer) on  08/31/2019 02:10 PM  ----------------------------------------------------------------   ----------------------------------------------------------------  Electronically signed by Ash SanchesInterpreting  physician) on 08/31/2019 05:09 PM  ----------------------------------------------------------------  Findings:   Right Impression:                      Left Impression:  The common femoral, femoral, popliteal The common femoral, femoral,  and tibial veins demonstrate normal    popliteal and tibial veins  compressibility and augmentation. demonstrate normal compressibility  Limited visualization of the lower     and augmentation. thigh and calf veins. 5.12 x 3.33 cm Encapsulated mass with  color and spectral Doppler flow is  noted with neck connecting to common  femoral artery.   Risk Factors History +------------------------------------+----------+---------------+----------+ ! PTV                                 ! Yes       ! Yes            ! None      ! +------------------------------------+----------+---------------+----------+ ! Peroneal                            !Yes       ! Yes            ! None      ! +------------------------------------+----------+---------------+----------+ ! Gastroc                             ! Yes       ! Yes            ! None      ! +------------------------------------+----------+---------------+----------+ ! GSV Thigh                           ! Yes       ! Yes            ! None      ! +------------------------------------+----------+---------------+----------+ ! GSV Knee                            ! Yes       ! Yes            ! None      ! +------------------------------------+----------+---------------+----------+ ! GSV Ankle                           ! Yes       ! Yes            ! None      ! +------------------------------------+----------+---------------+----------+ ! SSV                                 ! Yes       ! Yes            ! None      ! +------------------------------------+----------+---------------+----------+ Right Doppler Measurements +---------------------------+------+------+--------------------------------+ ! Location                   ! Signal!Reflux! Reflux (msec)                   ! +---------------------------+------+------+--------------------------------+ ! Common Femoral             !Phasic! No    !                                ! +---------------------------+------+------+--------------------------------+ ! Prox Femoral               !Phasic! No    !                                ! +---------------------------+------+------+--------------------------------+ ! Popliteal                  !Phasic! No    !                                ! +---------------------------+------+------+--------------------------------+    Vl Lower Extremity Arterial Segmental Pressures W Ppg concerning risk stratification: Risk stratification incorporates both clinical history and some testing results. Final risk determination is the responsibility of the ordering provider as other patient information and test results may increase or decrease the risk assessment reported for this examination. Risk stratification criteria are adapted from \"Noninvasive Risk Stratification\" criteria from Shefali Rosenbaum. Al, ACC/AATS/AHA/ASE/ASNC/SCAI/SCCT/STS 2017 Appropriate Use Criteria For Coronary Revascularization in Patients With Stable Ischemic Heart Disease [facility] Volume 69, Issue 17, May 2017 High risk (>3% annual death or MI) 1. Severe resting LV dysfunction (LVEF <35%) not readily explained by non coronary causes 2. Resting perfusion abnormalities greater than 10% of the myocardium in patients without prior history or evidence of MI3. Stress-induced perfusion abnormalities encumbering greater than or equal to 10% myocardium or stress segmental scores indicating multiple vascular territories with abnormalities 4. Stress-induced LV dilatation (TID ratio greater than 1.19 for exercise and greater than 1.39 for regadenoson) Intermediate risk (1% to 3% annual death or MI) 1. Mild/moderate resting LV dysfunction (LVEF 35% to 49%) not readily explained by non coronary causes. 2. Resting perfusion abnormalities in 5%-9.9% of the myocardium in patients without a history or prior evidence of MI 3. Stress-induced perfusion abnormality encumbering 5%-9.9% of the myocardium or stress segmental scores indicating 1 vascular territory with abnormalities but without LV dilation 4. Small wall motion abnormality involving 1-2 segments and only 1 coronary bed. Low Risk (Less than 1% annual death or MI) 1. Normal or small myocardial perfusion defect at rest or with stress encumbering less than 5% of the myocardium.        Primary Problem  <principal problem not specified>    Active Hospital Problems    Diagnosis Date Noted    Acute kidney

## 2019-09-01 NOTE — PROGRESS NOTES
of anemia and GI bleed preventing her from being on chronic anticoagulation  · Protonix for PUD prophylaxis   · Incentive spirometry every hour while awake  · Discussed with RN  · Monitor H&H, check PTT  · Will follow with you  Electronically signed by     Mitchell James MD on 9/1/2019 at 4:22 PM  Pulmonary Critical Care and Sleep Medicine,  [facility]  Cell: [phone]  Office: [phone]

## 2019-09-01 NOTE — CONSULTS
200 MG tablet Take 200 mg by mouth daily    Yes Historical Provider, MD   senna (SENOKOT) 8.6 MG tablet Take 2 tablets by mouth nightly    Yes Historical Provider, MD   aspirin 81 MG tablet Take 81 mg by mouth daily    Yes Historical Provider, MD   ferrous sulfate 325 (65 Fe) MG EC tablet Take 1 tablet by mouth 2 times daily (with meals) 12/21/17  Yes Jose HO Blood, DO   rasagiline mesylate 1 MG TABS Take 1 tablet by mouth daily   Yes Historical Provider, MD   calcium citrate (CALCITRATE) 250 MG TABS tablet Take 500 mg by mouth 3 times daily    Historical Provider, MD   nystatin-triamcinolone (MYCOLOG II) 632625-4.1 UNIT/GM-% cream Apply topically 2 times daily abd folds    Historical Provider, MD   cyclobenzaprine (FLEXERIL) 5 MG tablet Take 5 mg by mouth 3 times daily as needed for Muscle spasms    Historical Provider, MD   budesonide-formoterol (SYMBICORT) 80-4.5 MCG/ACT AERO Inhale 2 puffs into the lungs 2 times daily    Historical Provider, MD   ipratropium-albuterol (DUONEB) 0.5-2.5 (3) MG/3ML SOLN nebulizer solution Inhale 3 mLs into the lungs three times daily 3/20/19   Lesly Hill, DO   ONETOUCH VERIO strip 1 each by In Vitro route daily As needed. 3/4/19   Lesly Hill, DO   ONE TOUCH ULTRA TEST strip USE 1 STRIP THREE TIMES A DAY 12/26/18   Lesly Hill, DO   albuterol (ACCUNEB) 1.25 MG/3ML nebulizer solution Inhale 1 ampule into the lungs every 6 hours as needed for Wheezing or Shortness of Breath    Historical Provider, MD   [facility] LANCETS 16M MISC 1 Units by Does not apply route 2 times daily 10/23/18   Lesly Hill, DO   magnesium oxide (MAG-OX) 400 (241.3 Mg) MG TABS tablet Take 1 tablet by mouth 2 times daily 9/7/18   Brandi Jewett Orlop, DO   vitamin D (CHOLECALCIFEROL) 5000 units CAPS capsule Take 5,000 Units by mouth daily    Historical Provider, MD   Oxygen Concentrator Dx: Pneumonia, use as directed. Patient taking differently: Dx: Pneumonia, use as directed.    ON 24/7 2/10/17 Lesly Hill DO        Allergies:       Dye [barium-containing compounds]; Pcn [penicillins]; and Bactrim [sulfamethoxazole-trimethoprim]    Social History:     Tobacco:    reports that she quit smoking about 48 years ago. Her smoking use included cigarettes. She has a 30.00 pack-year smoking history. She has never used smokeless tobacco.  Alcohol:      reports that she drinks about 2.0 standard drinks of alcohol per week. Drug Use:  reports that she does not use drugs.     Family History:     Family History   Problem Relation Age of Onset    Heart Failure Mother     Hypertension Mother     Heart Disease Mother     High Blood Pressure Mother        Review of Systems:     Positive and Negative as described in HPI    Constitutional:  negative for  fevers, chills, sweats, fatigue, and weight loss  HEENT:  negative for vision, hard of hearing at baseline  Respiratory:  negative for shortness of breath, cough, or congestion  Cardiovascular: Presented with chest pain palpitations  Gastrointestinal:  negative for nausea, vomiting, diarrhea, constipation, abdominal pain  Genitourinary:  negative for frequency, dysuria  Integument/Breast:  negative for rash, skin lesions  Musculoskeletal:  negative for muscle aches or joint pain  Neurological:  negative for headaches, dizziness, lightheadedness, numbness, pain and tingling extremities  Behavior/Psych:  negative for depression and anxiety    Code Status:  Full Code    Physical Exam:     Vitals:  /61   Pulse 81   Temp 97.8 °F (36.6 °C) (Temporal)   Resp 16   Ht 5' (1.524 m)   Wt 184 lb 14.4 oz (83.9 kg)   LMP 2004 (Within Years)   SpO2 99%   BMI 36.11 kg/m²   Temp (24hrs), Av.1 °F (36.7 °C), Min:97.8 °F (36.6 °C), Max:98.7 °F (37.1 °C)      General appearance - alert, well appearing and in no acute distress  Mental status - oriented to person, place and time with normal affect  Head - normocephalic and atraumatic  Eyes - pupils equal and reactive,

## 2019-09-01 NOTE — PROGRESS NOTES
Component Value Date     09/01/2019    K 4.9 09/01/2019     09/01/2019    CO2 26 09/01/2019    BUN 63 09/01/2019    CREATININE 2.52 09/01/2019    GLUCOSE 113 09/01/2019    GLUCOSE 132 03/07/2012    CALCIUM 7.7 09/01/2019    PROT 6.3 06/05/2019    LABALBU 3.2 06/05/2019    LABALBU Detected 11/30/2018    BILITOT 0.43 06/05/2019    ALKPHOS 67 06/05/2019    AST 17 06/05/2019    ALT <5 06/05/2019      Hepatic:   Lab Results   Component Value Date    AST 17 06/05/2019    AST 17 06/04/2019    AST 14 11/28/2018    ALT <5 (L) 06/05/2019    ALT <5 (L) 06/04/2019    ALT <5 (L) 11/28/2018    BILITOT 0.43 06/05/2019    BILITOT 0.52 06/04/2019    BILITOT 0.16 (L) 11/28/2018    ALKPHOS 67 06/05/2019    ALKPHOS 75 06/04/2019    ALKPHOS 42 11/28/2018     BNP:   Lab Results   Component Value Date    BNP 40 06/07/2013     (H) 05/31/2013     Lipids:   Lab Results   Component Value Date    CHOL 135 08/26/2017    HDL 30 (L) 08/26/2017     INR:   Lab Results   Component Value Date    INR 1.0 08/30/2019    INR 1.0 08/25/2019    INR 1.0 06/05/2019     PTH: No results found for: PTH  Phosphorus:    Lab Results   Component Value Date    PHOS 4.2 09/01/2019     Ionized Calcium: No results found for: IONCA  Magnesium:   Lab Results   Component Value Date    MG 1.8 09/01/2019     Albumin:   Lab Results   Component Value Date    LABALBU 3.2 06/05/2019    LABALBU Detected 11/30/2018     Last 3 CK, CKMB, Troponin: @LABRCNT(CKTOTAL:3,CKMB:3,TROPONINI:3)       URINE:)No results found for: [place]    Radiology:   Reviewed. Assessment:  1. Acute Kidney Injury-multifactorial       A. Bactrim       B. Vasomotor  2. CKD 4  3. Hyperkalemia  4. HTN with recent hypotension  5. Abnormal stress test  6. Anemia of chronic disease    Plan:  Agree with transfusion  K improved  Off Lasix for now. Monitor renal function daily  Avoid nephrotoxic drugs, fleet's enemas, NSAIDs. Adequate Iron studies. Continue aranesp.   Hematology

## 2019-09-02 ENCOUNTER — APPOINTMENT (OUTPATIENT)
Dept: GENERAL RADIOLOGY | Age: 74
DRG: 287 | End: 2019-09-02
Payer: COMMERCIAL

## 2019-09-02 LAB
ABSOLUTE EOS #: 0.09 K/UL (ref 0–0.4)
ABSOLUTE IMMATURE GRANULOCYTE: 0.09 K/UL (ref 0–0.3)
ABSOLUTE LYMPH #: 1.16 K/UL (ref 1–4.8)
ABSOLUTE MONO #: 0.8 K/UL (ref 0.2–0.8)
ANION GAP SERPL CALCULATED.3IONS-SCNC: 12 MMOL/L (ref 9–17)
BASOPHILS # BLD: 0 %
BASOPHILS ABSOLUTE: 0 K/UL (ref 0–0.2)
BUN BLDV-MCNC: 49 MG/DL (ref 8–23)
BUN/CREAT BLD: 26 (ref 9–20)
CALCIUM SERPL-MCNC: 7.5 MG/DL (ref 8.6–10.4)
CHLORIDE BLD-SCNC: 104 MMOL/L (ref 98–107)
CO2: 25 MMOL/L (ref 20–31)
CREAT SERPL-MCNC: 1.92 MG/DL (ref 0.5–0.9)
DIFFERENTIAL TYPE: ABNORMAL
EOSINOPHILS RELATIVE PERCENT: 1 % (ref 1–4)
GFR AFRICAN AMERICAN: 31 ML/MIN
GFR NON-AFRICAN AMERICAN: 26 ML/MIN
GFR SERPL CREATININE-BSD FRML MDRD: ABNORMAL ML/MIN/{1.73_M2}
GFR SERPL CREATININE-BSD FRML MDRD: ABNORMAL ML/MIN/{1.73_M2}
GLUCOSE BLD-MCNC: 115 MG/DL (ref 70–99)
GLUCOSE BLD-MCNC: 129 MG/DL (ref 65–105)
GLUCOSE BLD-MCNC: 135 MG/DL (ref 65–105)
GLUCOSE BLD-MCNC: 137 MG/DL (ref 65–105)
GLUCOSE BLD-MCNC: 150 MG/DL (ref 65–105)
HCT VFR BLD CALC: 22.8 % (ref 36.3–47.1)
HCT VFR BLD CALC: 27.9 % (ref 36.3–47.1)
HEMOGLOBIN: 6.8 G/DL (ref 11.9–15.1)
HEMOGLOBIN: 8.7 G/DL (ref 11.9–15.1)
IMMATURE GRANULOCYTES: 1 %
LYMPHOCYTES # BLD: 13 % (ref 24–44)
MCH RBC QN AUTO: 30.4 PG (ref 25.2–33.5)
MCHC RBC AUTO-ENTMCNC: 29.8 G/DL (ref 28.4–34.8)
MCV RBC AUTO: 101.8 FL (ref 82.6–102.9)
MONOCYTES # BLD: 9 % (ref 1–7)
MORPHOLOGY: ABNORMAL
MORPHOLOGY: ABNORMAL
NRBC AUTOMATED: 0 PER 100 WBC
PDW BLD-RTO: 17.8 % (ref 11.8–14.4)
PLATELET # BLD: 104 K/UL (ref 138–453)
PLATELET ESTIMATE: ABNORMAL
PMV BLD AUTO: 11 FL (ref 8.1–13.5)
POTASSIUM SERPL-SCNC: 5.2 MMOL/L (ref 3.7–5.3)
RBC # BLD: 2.24 M/UL (ref 3.95–5.11)
RBC # BLD: ABNORMAL 10*6/UL
SEG NEUTROPHILS: 76 % (ref 36–66)
SEGMENTED NEUTROPHILS ABSOLUTE COUNT: 6.76 K/UL (ref 1.8–7.7)
SODIUM BLD-SCNC: 141 MMOL/L (ref 135–144)
WBC # BLD: 8.9 K/UL (ref 3.5–11.3)
WBC # BLD: ABNORMAL 10*3/UL

## 2019-09-02 PROCEDURE — 2700000000 HC OXYGEN THERAPY PER DAY

## 2019-09-02 PROCEDURE — 94640 AIRWAY INHALATION TREATMENT: CPT

## 2019-09-02 PROCEDURE — P9016 RBC LEUKOCYTES REDUCED: HCPCS

## 2019-09-02 PROCEDURE — 36430 TRANSFUSION BLD/BLD COMPNT: CPT

## 2019-09-02 PROCEDURE — 2060000000 HC ICU INTERMEDIATE R&B

## 2019-09-02 PROCEDURE — 85025 COMPLETE CBC W/AUTO DIFF WBC: CPT

## 2019-09-02 PROCEDURE — 6370000000 HC RX 637 (ALT 250 FOR IP): Performed by: INTERNAL MEDICINE

## 2019-09-02 PROCEDURE — 6360000002 HC RX W HCPCS: Performed by: NURSE PRACTITIONER

## 2019-09-02 PROCEDURE — 99254 IP/OBS CNSLTJ NEW/EST MOD 60: CPT | Performed by: INTERNAL MEDICINE

## 2019-09-02 PROCEDURE — 2500000003 HC RX 250 WO HCPCS: Performed by: INTERNAL MEDICINE

## 2019-09-02 PROCEDURE — 85014 HEMATOCRIT: CPT

## 2019-09-02 PROCEDURE — 85018 HEMOGLOBIN: CPT

## 2019-09-02 PROCEDURE — 2580000003 HC RX 258: Performed by: INTERNAL MEDICINE

## 2019-09-02 PROCEDURE — 86900 BLOOD TYPING SEROLOGIC ABO: CPT

## 2019-09-02 PROCEDURE — 80048 BASIC METABOLIC PNL TOTAL CA: CPT

## 2019-09-02 PROCEDURE — 82947 ASSAY GLUCOSE BLOOD QUANT: CPT

## 2019-09-02 PROCEDURE — 71045 X-RAY EXAM CHEST 1 VIEW: CPT

## 2019-09-02 PROCEDURE — 6360000002 HC RX W HCPCS: Performed by: INTERNAL MEDICINE

## 2019-09-02 PROCEDURE — 36415 COLL VENOUS BLD VENIPUNCTURE: CPT

## 2019-09-02 PROCEDURE — 97110 THERAPEUTIC EXERCISES: CPT

## 2019-09-02 RX ADMIN — SENNOSIDES 17.2 MG: 8.6 TABLET, FILM COATED ORAL at 20:12

## 2019-09-02 RX ADMIN — Medication: at 00:50

## 2019-09-02 RX ADMIN — Medication 2 PUFF: at 08:58

## 2019-09-02 RX ADMIN — CARBIDOPA AND LEVODOPA 1 TABLET: 25; 100 TABLET, EXTENDED RELEASE ORAL at 20:12

## 2019-09-02 RX ADMIN — Medication 1 MG: at 23:41

## 2019-09-02 RX ADMIN — ATORVASTATIN CALCIUM 20 MG: 20 TABLET, FILM COATED ORAL at 20:12

## 2019-09-02 RX ADMIN — HEPARIN SODIUM 5000 UNITS: 5000 INJECTION INTRAVENOUS; SUBCUTANEOUS at 22:06

## 2019-09-02 RX ADMIN — IPRATROPIUM BROMIDE AND ALBUTEROL SULFATE 3 ML: .5; 3 SOLUTION RESPIRATORY (INHALATION) at 08:58

## 2019-09-02 RX ADMIN — ROPINIROLE HYDROCHLORIDE 2 MG: 2 TABLET, FILM COATED ORAL at 08:04

## 2019-09-02 RX ADMIN — HEPARIN SODIUM 5000 UNITS: 5000 INJECTION INTRAVENOUS; SUBCUTANEOUS at 06:08

## 2019-09-02 RX ADMIN — PANTOPRAZOLE SODIUM 40 MG: 40 TABLET, DELAYED RELEASE ORAL at 06:09

## 2019-09-02 RX ADMIN — ANTI-FUNGAL POWDER MICONAZOLE NITRATE TALC FREE: 1.42 POWDER TOPICAL at 22:10

## 2019-09-02 RX ADMIN — METOPROLOL TARTRATE 25 MG: 25 TABLET, FILM COATED ORAL at 20:12

## 2019-09-02 RX ADMIN — PANTOPRAZOLE SODIUM 40 MG: 40 TABLET, DELAYED RELEASE ORAL at 08:03

## 2019-09-02 RX ADMIN — SODIUM CHLORIDE, PRESERVATIVE FREE 10 ML: 5 INJECTION INTRAVENOUS at 20:14

## 2019-09-02 RX ADMIN — Medication 5 MG: at 22:10

## 2019-09-02 RX ADMIN — IPRATROPIUM BROMIDE AND ALBUTEROL SULFATE 3 ML: .5; 3 SOLUTION RESPIRATORY (INHALATION) at 15:29

## 2019-09-02 RX ADMIN — CALCITRIOL 0.25 MCG: 0.25 CAPSULE ORAL at 08:03

## 2019-09-02 RX ADMIN — ASPIRIN 81 MG: 81 TABLET, COATED ORAL at 08:03

## 2019-09-02 RX ADMIN — HYDROCODONE BITARTRATE AND ACETAMINOPHEN 1 TABLET: 5; 325 TABLET ORAL at 08:08

## 2019-09-02 RX ADMIN — PANTOPRAZOLE SODIUM 40 MG: 40 TABLET, DELAYED RELEASE ORAL at 16:00

## 2019-09-02 RX ADMIN — HYDROCODONE BITARTRATE AND ACETAMINOPHEN 1 TABLET: 5; 325 TABLET ORAL at 13:36

## 2019-09-02 RX ADMIN — HEPARIN SODIUM 5000 UNITS: 5000 INJECTION INTRAVENOUS; SUBCUTANEOUS at 14:00

## 2019-09-02 RX ADMIN — AMIODARONE HYDROCHLORIDE 200 MG: 200 TABLET ORAL at 08:03

## 2019-09-02 RX ADMIN — Medication 2 PUFF: at 20:35

## 2019-09-02 RX ADMIN — LINAGLIPTIN 2.5 MG: 5 TABLET, FILM COATED ORAL at 08:03

## 2019-09-02 RX ADMIN — RASAGILINE 1 MG: 1 TABLET ORAL at 08:05

## 2019-09-02 RX ADMIN — HYDROCODONE BITARTRATE AND ACETAMINOPHEN 1 TABLET: 5; 325 TABLET ORAL at 03:20

## 2019-09-02 RX ADMIN — HYDROCODONE BITARTRATE AND ACETAMINOPHEN 1 TABLET: 5; 325 TABLET ORAL at 19:26

## 2019-09-02 RX ADMIN — CARBIDOPA AND LEVODOPA 1 TABLET: 25; 100 TABLET, EXTENDED RELEASE ORAL at 13:36

## 2019-09-02 RX ADMIN — ROPINIROLE HYDROCHLORIDE 2 MG: 2 TABLET, FILM COATED ORAL at 13:36

## 2019-09-02 RX ADMIN — IPRATROPIUM BROMIDE AND ALBUTEROL SULFATE 3 ML: .5; 3 SOLUTION RESPIRATORY (INHALATION) at 20:34

## 2019-09-02 RX ADMIN — ROPINIROLE HYDROCHLORIDE 2 MG: 2 TABLET, FILM COATED ORAL at 22:11

## 2019-09-02 RX ADMIN — CARBIDOPA AND LEVODOPA 1 TABLET: 25; 100 TABLET, EXTENDED RELEASE ORAL at 08:04

## 2019-09-02 ASSESSMENT — PAIN SCALES - GENERAL
PAINLEVEL_OUTOF10: 10
PAINLEVEL_OUTOF10: 9
PAINLEVEL_OUTOF10: 10
PAINLEVEL_OUTOF10: 10
PAINLEVEL_OUTOF10: 9
PAINLEVEL_OUTOF10: 3
PAINLEVEL_OUTOF10: 7
PAINLEVEL_OUTOF10: 8

## 2019-09-02 ASSESSMENT — PAIN DESCRIPTION - PAIN TYPE
TYPE: SURGICAL PAIN
TYPE: ACUTE PAIN

## 2019-09-02 ASSESSMENT — PAIN DESCRIPTION - FREQUENCY
FREQUENCY: INTERMITTENT
FREQUENCY: INTERMITTENT

## 2019-09-02 ASSESSMENT — PAIN DESCRIPTION - ORIENTATION
ORIENTATION: RIGHT
ORIENTATION: RIGHT

## 2019-09-02 ASSESSMENT — PAIN DESCRIPTION - PROGRESSION
CLINICAL_PROGRESSION: NOT CHANGED

## 2019-09-02 ASSESSMENT — PAIN DESCRIPTION - LOCATION
LOCATION: GROIN
LOCATION: LEG

## 2019-09-02 ASSESSMENT — PAIN DESCRIPTION - ONSET
ONSET: ON-GOING
ONSET: ON-GOING

## 2019-09-02 ASSESSMENT — PAIN DESCRIPTION - DESCRIPTORS
DESCRIPTORS: ACHING;PRESSURE
DESCRIPTORS: ACHING

## 2019-09-02 NOTE — PROGRESS NOTES
Subjective:    Type 2 diabetes  Polyuria no polydipsia no hypoglycemia blood sugars acceptable  ROS  Eyes any fever chills no GI  complaints no cardiopulmonary complaints except dyspnea with minimal exertion using oxygen all the time no TIA no bleeding no headache no sore throat no skin lesions  physical exam  General Appearance: alert and oriented to person, place and time and in no acute distress  Skin: warm and dry, no rash or erythema  Head: normocephalic and atraumatic  Eyes: pupils equal, round, and reactive to light, sclera anicteric and positive pallor  Neck: neck supple and non tender without mass   Pulmonary/Chest: clear to auscultation bilaterally- no wheezes, rales or rhonchi, normal air movement, no respiratory distress  Cardiovascular: normal rate, regular rhythm, normal S1 and S2, no gallops, intact distal pulses and no carotid bruits  Abdomen: soft, non-tender, non-distended, normal bowel sounds, no masses or organomegaly  Extremities: Edema good pulses negative Homans sign    Neurologic: Alert oriented x3 with no focal deficit    BP (!) 100/31   Pulse 65   Temp 97.6 °F (36.4 °C) (Temporal)   Resp 20   Ht 5' (1.524 m)   Wt 187 lb 6.4 oz (85 kg)   LMP 04/03/2004 (Within Years)   SpO2 100%   BMI 36.60 kg/m²     CBC:   Lab Results   Component Value Date    WBC 8.9 09/02/2019    RBC 2.24 09/02/2019    HGB 8.7 09/02/2019    HCT 27.9 09/02/2019    .8 09/02/2019    MCH 30.4 09/02/2019    MCHC 29.8 09/02/2019    RDW 17.8 09/02/2019     09/02/2019    MPV 11.0 09/02/2019     CMP:    Lab Results   Component Value Date     09/02/2019    K 5.2 09/02/2019     09/02/2019    CO2 25 09/02/2019    BUN 49 09/02/2019    CREATININE 1.92 09/02/2019    GFRAA 31 09/02/2019    LABGLOM 26 09/02/2019    GLUCOSE 115 09/02/2019    GLUCOSE 132 03/07/2012    PROT 6.3 06/05/2019    LABALBU 3.2 06/05/2019    LABALBU Detected 11/30/2018    CALCIUM 7.5 09/02/2019    BILITOT 0.16 09/01/2019

## 2019-09-02 NOTE — PROGRESS NOTES
Progress  Note    Reason for Consult: KRISTIN    Requesting Physician:  Kurtis Meneses MD    Interval History  Creatinine improving to 1.92.  K 5.2. Good urine output. S/p PRBC yesterday. Hgb 8.7 today. C/o feeling tired and right groin/leg pain. Denies SOB. Results for Karlee Cardozo (MRN [id number]) as of 9/2/2019 15:58   Ref. Range 8/29/2019 06:34 8/30/2019 04:46 8/31/2019 03:54 9/1/2019 04:45 9/2/2019 05:10   Creatinine Latest Ref Range: 0.50 - 0.90 mg/dL 2.06 (H) 2.25 (H) 2.37 (H) 2.52 (H) 1.92 (H)       HISTORY OF PRESENT ILLNESS:    The patient is a 69 y.o. female who presents with chest pain and SOB, which started on Sunday. Nuclear Stress test showed reversible ischemia. Cardiac cath planned. Creatinine is elevated at 2.89 today, which increased from 2.4 yesterday. Baseline creatinine CKD 4 with baseline creatinine around 1.7-1.9. She states she was recently treated for UTI on bactrim, which she completed over the weekend. K trending 5.1-5.2 during admission. Bicarb level stable. Home meds include lasix. She thinks her weight has been increasing at home. Hx of hypoacalcemia. Ca level ok. She is on vitamin D,  ca supplementation, and calcitriol. Hx of anemia on EMMY outpatient. She c/o leg cramps and feeling of hypocalcemia recently. She increased ca supplementation, symptoms resolved, and she resumed her usual dosages. She denies CP, SOB currently. C/o lightheadedness recently. She does not monitor BP at home. Hypotensive yesterday with SBP 90s. BP is better today. Hx of afib. HR has been stable. She is on lasix and IVF. Excellent urine output. CXR did not show CHF, but did show atelectasis. Prior to Admission medications    Medication Sig Start Date End Date Taking?  Authorizing Provider   darbepoetin albaro-polysorbate (ARANESP) 200 MCG/0.4ML SOSY injection Inject 200 mcg into the skin every 28 days   Yes Historical Provider, MD   cyanocobalamin 1000 MCG/ML injection Inject 1,000 mcg into the TID WC    insulin lispro  0-3 Units Subcutaneous Nightly    heparin (porcine)  5,000 Units Subcutaneous 3 times per day    aspirin  81 mg Oral Daily    pantoprazole  40 mg Oral BID AC    rasagiline mesylate  1 mg Oral Daily     Continuous Infusions:   dextrose       PRN Meds:HYDROcodone 5 mg - acetaminophen, sodium chloride flush, acetaminophen, magnesium hydroxide, ondansetron **OR** ondansetron, morphine, glucose, dextrose, glucagon (rDNA), dextrose, albuterol, Melatonin, cyclobenzaprine    Physical Exam:  Vitals:    09/02/19 0639 09/02/19 0800 09/02/19 0808 09/02/19 1200   BP: (!) 100/31 (!) 115/32  (!) 105/38   Pulse: 57 65  65   Resp: 16 20  20   Temp: 97.7 °F (36.5 °C) 97.6 °F (36.4 °C)  97.4 °F (36.3 °C)   TempSrc: Temporal Temporal  Temporal   SpO2: 93% 100%  97%   Weight:   187 lb 6.4 oz (85 kg)    Height:         I/O last 3 completed shifts: In: 4727 [P.O.:575; Blood:765]  Out: 2550 [Urine:2550]      General:  Awake, alert, not in distress. Appears to be stated age. HEENT: Atraumatic, normocephalic. Anicteric sclera. Pink and moist oral mucosa. Neck supple. No JVD. Chest: Crackles bibasilar, diminished bilaterally. Cardiovascular: RRR, S1S2, no murmur, rub or gallop. +2 right lower extremity edema. Abdomen: Soft, non tender to palpation. Musculoskeletal:   No cyanosis or clubbing. Integumentary: Pink, warm and dry. Free from rash or lesions. Skin turgor normal.  CNS: Oriented to person, place and time. Cranial nerves grossly intact. Speech clear. Face symmetrical. No tremor.      Data:    CBC:   Lab Results   Component Value Date    WBC 8.9 09/02/2019    HGB 8.7 (L) 09/02/2019    HCT 27.9 (L) 09/02/2019    .8 09/02/2019     (L) 09/02/2019     BMP:    Lab Results   Component Value Date     09/02/2019     09/01/2019     08/31/2019    K 5.2 09/02/2019    K 4.9 09/01/2019    K 5.0 08/31/2019     09/02/2019     09/01/2019     08/31/2019    CO2

## 2019-09-02 NOTE — CONSULTS
Today's Date: 9/2/2019  Patient Name: Rama Estrada  Date of admission: 8/25/2019 10:20 AM  Patient's age: 69 y.o., 1945  Admission Dx: Chest pain [R07.9]  Acute kidney injury ([address] [N17.9]    Reason for Consult: anemia  Requesting Physician: Juan Zee MD    CHIEF COMPLAINT:    Chief Complaint   Patient presents with    Chest Pain     History Obtained From: pt and chart    HISTORY OF PRESENT ILLNESS:    This is a 29-year-old female with medical history of CHF, chronic kidney disease, atrial fibrillation, GERD, hypertension, hyperlipidemia who was admitted on 8/25/2019 with chest pain. She denies any syncopal episode, nausea vomiting. She has chronic anemia since 2013 with hemoglobin ranging around 8-9. She is on Aranesp as per nephrology. Patient has been following with Dr. Christin Hernandez as outpatient. She was seen by cardiology. Her nuclear stress test showed reversible ischemia and so cardiac catheterization planned. Her hemoglobin here was noted to be 5.3 and she received PRBC transfusion. Most recent hemoglobin is stable at 8.7.   Past Medical History:   has a past medical history of Acute on chronic diastolic congestive heart failure ([address] Anesthesia complication, Asthma, Atrial fibrillation ([address] Cataracts, bilateral, Cellulitis, Cerebral artery occlusion with cerebral infarction ([address] CHF (congestive heart failure) ([address] Chronic kidney disease, Chronic kidney disease, Closed fracture of proximal end of right humerus with routine healing, Constipation, COPD (chronic obstructive pulmonary disease) ([address] Difficult intravenous access, Difficulty walking, Diverticulosis, DM2 (diabetes mellitus, type 2) ([address] Full dentures, GERD (gastroesophageal reflux disease), H/O fall, Headache(784.0), [place] (hard of hearing), Hyperlipidemia, Hyperplastic polyp of intestine, Hypertension, Impaired ambulation, Kidney stone, Living in nursing home, Morbid obesity with BMI of 40.0-44.9, adult ([address] Muscle mouth daily 5/30/19  Yes Eva Ba MD   atorvastatin (LIPITOR) 20 MG tablet TAKE 1 TABLET DAILY 4/18/19  Yes Lesly Hill DO   carbidopa-levodopa (SINEMET CR)  MG per extended release tablet Take 1 tablet by mouth 4 times daily  Patient taking differently: Take 1 tablet by mouth 3 times daily  4/9/19  Yes Lesly Hill DO   rOPINIRole (REQUIP) 2 MG tablet Take 1 tablet by mouth 3 times daily 4/9/19  Yes Lesly Hill DO   pantoprazole (PROTONIX) 40 MG tablet TAKE 1 TABLET TWICE A DAY BEFORE MEALS 2/7/19  Yes Lesly Hill DO   linagliptin (TRADJENTA) 5 MG tablet Take 0.5 tablets by mouth daily 1/16/19  Yes Lesly Hill DO   amiodarone (CORDARONE) 200 MG tablet Take 200 mg by mouth daily    Yes Historical Provider, MD   senna (SENOKOT) 8.6 MG tablet Take 2 tablets by mouth nightly    Yes Historical Provider, MD   aspirin 81 MG tablet Take 81 mg by mouth daily    Yes Historical Provider, MD   ferrous sulfate 325 (65 Fe) MG EC tablet Take 1 tablet by mouth 2 times daily (with meals) 12/21/17  Yes Jose Perez,    rasagiline mesylate 1 MG TABS Take 1 tablet by mouth daily   Yes Historical Provider, MD   calcium citrate (CALCITRATE) 250 MG TABS tablet Take 500 mg by mouth 3 times daily    Historical Provider, MD   nystatin-triamcinolone (MYCOLOG II) 358602-3.1 UNIT/GM-% cream Apply topically 2 times daily abd folds    Historical Provider, MD   cyclobenzaprine (FLEXERIL) 5 MG tablet Take 5 mg by mouth 3 times daily as needed for Muscle spasms    Historical Provider, MD   budesonide-formoterol (SYMBICORT) 80-4.5 MCG/ACT AERO Inhale 2 puffs into the lungs 2 times daily    Historical Provider, MD   ipratropium-albuterol (DUONEB) 0.5-2.5 (3) MG/3ML SOLN nebulizer solution Inhale 3 mLs into the lungs three times daily 3/20/19   Lesly Hill DO   ONETOUCH VERIO strip 1 each by In Vitro route daily As needed.  3/4/19   Lesly L Hill, DO   ONE TOUCH ULTRA TEST strip USE 1 STRIP THREE TIMES A DAY 12/26/18   Lesly Hill DO   albuterol (ACCUNEB) 1.25 MG/3ML nebulizer solution Inhale 1 ampule into the lungs every 6 hours as needed for Wheezing or Shortness of Breath    Historical Provider, MD [place] LANCETS 14N MISC 1 Units by Does not apply route 2 times daily 10/23/18   Lesly Hill DO   magnesium oxide (MAG-OX) 400 (241.3 Mg) MG TABS tablet Take 1 tablet by mouth 2 times daily 9/7/18   Sade Marcos, DO   vitamin D (CHOLECALCIFEROL) 5000 units CAPS capsule Take 5,000 Units by mouth daily    Historical Provider, MD   Oxygen Concentrator Dx: Pneumonia, use as directed. Patient taking differently: Dx: Pneumonia, use as directed.    ON 24/7 2/10/17   Lesly Hill DO     Current Facility-Administered Medications   Medication Dose Route Frequency Provider Last Rate Last Dose    0.9 % sodium chloride bolus  250 mL Intravenous Once Lea Hayes MD        HYDROcodone-acetaminophen (NORCO) 5-325 MG per tablet 1 tablet  1 tablet Oral Q6H PRN Lea Hayes MD   1 tablet at 09/02/19 0808    sodium chloride flush 0.9 % injection 10 mL  10 mL Intravenous 2 times per day Robert Cheung MD        sodium chloride flush 0.9 % injection 10 mL  10 mL Intravenous PRN Robert Cheung MD        acetaminophen (TYLENOL) tablet 650 mg  650 mg Oral Q4H PRN Robert Cheung MD   650 mg at 09/01/19 1613    magnesium hydroxide (MILK OF MAGNESIA) 400 MG/5ML suspension 30 mL  30 mL Oral Daily PRN Robert Cheung MD        ondansetron (ZOFRAN-ODT) disintegrating tablet 4 mg  4 mg Oral Q6H PRN Robert Cheung MD        Or    ondansetron [facility]) injection 4 mg  4 mg Intravenous Q6H PRN Robert Cheung MD   4 mg at 09/01/19 1940    morphine (PF) injection 1 mg  1 mg Intravenous Q8H PRN Fede Roland APRN - CNP        metoprolol tartrate (LOPRESSOR) tablet 25 mg  25 mg Oral BID Robert Cheung MD   25 mg at 09/01/19 2150    miconazole (MICOTIN) 2 % powder   Topical BID MD Andra Tirado 09/02/19  0510    141   K 4.9 5.2   CO2 26 25   BUN 63* 49*   CREATININE 2.52* 1.92*   LABGLOM 19* 26*   GLUCOSE 113* 115*     PT/INR:   Recent Labs     09/01/19 2011   PROTIME 9.6*   INR 0.9       IMAGING DATA:      Primary Problem  <principal problem not specified>    Active Hospital Problems    Diagnosis Date Noted    Acute kidney injury ([facility] [N17.9] 08/27/2019    Chest pain [R07.9] 08/25/2019    Chronic respiratory failure with hypoxia ([facility] [J96.11] 09/23/2018         IMPRESSION:   1. Chest pain   2. Anemia  chronic anemia  3. Right femoral artery pseudoaneurysm  4. COPD  5. Atrial fibrillation  6. Chronic diastolic heart failure    RECOMMENDATIONS:  1. Pseudoaneurysm management as per vascular surgery   2. patient has baseline hemoglobin around 8-9 for past 2 to 3 years and even before that in 2013. She is following with Dr. Vignesh Ng as outpatient  3. Iron studies shows elevated ferritin low iron and low TIBC suggesting anemia of chronic disease  4. Check peripheral blood smear  5. PRBC transfusion if hemoglobin less than 7  6. Continue Aranesp  7. Check stool for occult blood  8. She may need bone marrow biopsy as outpatient if her hemoglobin does not improve  9. Will follow       Discussed with patient and Nurse. Thank you for asking us to see this patient.     Sagrario Marie MD  Hematologist/Medical Oncologist  Cell: [phone]

## 2019-09-02 NOTE — PROGRESS NOTES
needed   · Albuterol and Ipratropium Q 8 hours and prn  · Dulera 200  · Nephrology on consult  · Monitor urine output and blood pressure  · Audiology on consult  · History of anemia and GI bleed preventing her from being on chronic anticoagulation  · Monitor H&H  · Discussed with   · DVT prophylaxis      Mirtha Rizo MD on 9/2/2019 at 1:51 PM  Pulmonary Critical Care and Sleep Medicine,  [facility] AT [place]: [phone]

## 2019-09-02 NOTE — PROGRESS NOTES
Physical Therapy  Facility/Department: [facility] CVICU  Daily Treatment Note  NAME: Ronnie Todd  : 1945  MRN: [id number]    Date of Service: 2019    Discharge Recommendations:  Continue to assess pending progress, Subacute/Skilled Nursing Facility       Assessment   Body structures, Functions, Activity limitations: Decreased functional mobility ; Decreased endurance;Decreased balance;Decreased strength;Decreased safe awareness  Assessment: Pt requires a lot of assist for all mobility at present time. Recommend D/C to SNF however pt states she is going home with assistance of her caregiver. Decision Making: Medium Complexity  REQUIRES PT FOLLOW UP: Yes  Activity Tolerance  Activity Tolerance: Treatment limited secondary to medical complications (free text); Patient limited by fatigue;Patient limited by pain; Patient limited by endurance     Patient Diagnosis(es): The encounter diagnosis was Chest pain, unspecified type.      has a past medical history of Acute on chronic diastolic congestive heart failure ([address] Anesthesia complication, Asthma, Atrial fibrillation ([address] Cataracts, bilateral, Cellulitis, Cerebral artery occlusion with cerebral infarction ([address] CHF (congestive heart failure) ([address] Chronic kidney disease, Chronic kidney disease, Closed fracture of proximal end of right humerus with routine healing, Constipation, COPD (chronic obstructive pulmonary disease) ([address] Difficult intravenous access, Difficulty walking, Diverticulosis, DM2 (diabetes mellitus, type 2) ([address] Full dentures, GERD (gastroesophageal reflux disease), H/O fall, Headache(784.0), [place] (hard of hearing), Hyperlipidemia, Hyperplastic polyp of intestine, Hypertension, Impaired ambulation, Kidney stone, Living in nursing home, Morbid obesity with BMI of 40.0-44.9, adult ([address] Muscle weakness, Open wound, ECTOR on CPAP, Osteoarthritis, Parkinson disease ([address] Restless leg syndrome, Spinal stenosis in cervical region, Type II or

## 2019-09-03 LAB
ABO/RH: NORMAL
ABSOLUTE EOS #: 0.11 K/UL (ref 0–0.44)
ABSOLUTE IMMATURE GRANULOCYTE: 0.13 K/UL (ref 0–0.3)
ABSOLUTE LYMPH #: 1.04 K/UL (ref 1.1–3.7)
ABSOLUTE MONO #: 0.98 K/UL (ref 0.1–1.2)
ANION GAP SERPL CALCULATED.3IONS-SCNC: 9 MMOL/L (ref 9–17)
ANTIBODY SCREEN: NEGATIVE
ARM BAND NUMBER: NORMAL
BASOPHILS # BLD: 0 % (ref 0–2)
BASOPHILS ABSOLUTE: <0.03 K/UL (ref 0–0.2)
BLD PROD TYP BPU: NORMAL
BUN BLDV-MCNC: 45 MG/DL (ref 8–23)
BUN/CREAT BLD: 23 (ref 9–20)
CALCIUM SERPL-MCNC: 7.7 MG/DL (ref 8.6–10.4)
CHLORIDE BLD-SCNC: 103 MMOL/L (ref 98–107)
CO2: 30 MMOL/L (ref 20–31)
CREAT SERPL-MCNC: 1.93 MG/DL (ref 0.5–0.9)
CROSSMATCH RESULT: NORMAL
DIFFERENTIAL TYPE: ABNORMAL
DISPENSE STATUS BLOOD BANK: NORMAL
EKG ATRIAL RATE: 78 BPM
EKG ATRIAL RATE: 79 BPM
EKG P AXIS: 39 DEGREES
EKG P-R INTERVAL: 138 MS
EKG Q-T INTERVAL: 396 MS
EKG Q-T INTERVAL: 400 MS
EKG QRS DURATION: 92 MS
EKG QRS DURATION: 94 MS
EKG QTC CALCULATION (BAZETT): 454 MS
EKG QTC CALCULATION (BAZETT): 458 MS
EKG R AXIS: 30 DEGREES
EKG R AXIS: 31 DEGREES
EKG T AXIS: 14 DEGREES
EKG T AXIS: 32 DEGREES
EKG VENTRICULAR RATE: 79 BPM
EKG VENTRICULAR RATE: 79 BPM
EOSINOPHILS RELATIVE PERCENT: 1 % (ref 1–4)
EXPIRATION DATE: NORMAL
GFR AFRICAN AMERICAN: 31 ML/MIN
GFR NON-AFRICAN AMERICAN: 26 ML/MIN
GFR SERPL CREATININE-BSD FRML MDRD: ABNORMAL ML/MIN/{1.73_M2}
GFR SERPL CREATININE-BSD FRML MDRD: ABNORMAL ML/MIN/{1.73_M2}
GLUCOSE BLD-MCNC: 109 MG/DL (ref 65–105)
GLUCOSE BLD-MCNC: 113 MG/DL (ref 65–105)
GLUCOSE BLD-MCNC: 125 MG/DL (ref 65–105)
GLUCOSE BLD-MCNC: 128 MG/DL (ref 65–105)
GLUCOSE BLD-MCNC: 133 MG/DL (ref 70–99)
HCT VFR BLD CALC: 25.1 % (ref 36.3–47.1)
HEMOGLOBIN: 7.6 G/DL (ref 11.9–15.1)
IMMATURE GRANULOCYTES: 2 %
LYMPHOCYTES # BLD: 12 % (ref 24–43)
MCH RBC QN AUTO: 30 PG (ref 25.2–33.5)
MCHC RBC AUTO-ENTMCNC: 30.3 G/DL (ref 28.4–34.8)
MCV RBC AUTO: 99.2 FL (ref 82.6–102.9)
MONOCYTES # BLD: 11 % (ref 3–12)
NRBC AUTOMATED: 0 PER 100 WBC
PATHOLOGIST REVIEW: NORMAL
PATHOLOGIST: NORMAL
PDW BLD-RTO: 17.5 % (ref 11.8–14.4)
PLATELET # BLD: 116 K/UL (ref 138–453)
PLATELET ESTIMATE: ABNORMAL
PMV BLD AUTO: 10.9 FL (ref 8.1–13.5)
POTASSIUM SERPL-SCNC: 5.4 MMOL/L (ref 3.7–5.3)
RBC # BLD: 2.53 M/UL (ref 3.95–5.11)
RBC # BLD: ABNORMAL 10*6/UL
SEG NEUTROPHILS: 74 % (ref 36–65)
SEGMENTED NEUTROPHILS ABSOLUTE COUNT: 6.59 K/UL (ref 1.5–8.1)
SERUM IFX INTERP: NORMAL
SODIUM BLD-SCNC: 142 MMOL/L (ref 135–144)
SURGICAL PATHOLOGY REPORT: NORMAL
TRANSFUSION STATUS: NORMAL
UNIT DIVISION: 0
UNIT NUMBER: NORMAL
WBC # BLD: 8.9 K/UL (ref 3.5–11.3)
WBC # BLD: ABNORMAL 10*3/UL

## 2019-09-03 PROCEDURE — 94640 AIRWAY INHALATION TREATMENT: CPT

## 2019-09-03 PROCEDURE — 6370000000 HC RX 637 (ALT 250 FOR IP): Performed by: INTERNAL MEDICINE

## 2019-09-03 PROCEDURE — 94760 N-INVAS EAR/PLS OXIMETRY 1: CPT

## 2019-09-03 PROCEDURE — 80048 BASIC METABOLIC PNL TOTAL CA: CPT

## 2019-09-03 PROCEDURE — 82947 ASSAY GLUCOSE BLOOD QUANT: CPT

## 2019-09-03 PROCEDURE — 36002 PSEUDOANEURYSM INJECTION TRT: CPT

## 2019-09-03 PROCEDURE — 85025 COMPLETE CBC W/AUTO DIFF WBC: CPT

## 2019-09-03 PROCEDURE — 2580000003 HC RX 258: Performed by: INTERNAL MEDICINE

## 2019-09-03 PROCEDURE — 97530 THERAPEUTIC ACTIVITIES: CPT

## 2019-09-03 PROCEDURE — 3E05317 INTRODUCTION OF OTHER THROMBOLYTIC INTO PERIPHERAL ARTERY, PERCUTANEOUS APPROACH: ICD-10-PCS | Performed by: SURGERY

## 2019-09-03 PROCEDURE — 93926 LOWER EXTREMITY STUDY: CPT

## 2019-09-03 PROCEDURE — 2060000000 HC ICU INTERMEDIATE R&B

## 2019-09-03 PROCEDURE — 6360000002 HC RX W HCPCS: Performed by: INTERNAL MEDICINE

## 2019-09-03 PROCEDURE — 6360000002 HC RX W HCPCS: Performed by: NURSE PRACTITIONER

## 2019-09-03 PROCEDURE — 97110 THERAPEUTIC EXERCISES: CPT

## 2019-09-03 PROCEDURE — 99232 SBSQ HOSP IP/OBS MODERATE 35: CPT | Performed by: INTERNAL MEDICINE

## 2019-09-03 PROCEDURE — 2700000000 HC OXYGEN THERAPY PER DAY

## 2019-09-03 PROCEDURE — 6370000000 HC RX 637 (ALT 250 FOR IP): Performed by: SURGERY

## 2019-09-03 PROCEDURE — 36415 COLL VENOUS BLD VENIPUNCTURE: CPT

## 2019-09-03 RX ADMIN — HEPARIN SODIUM 5000 UNITS: 5000 INJECTION INTRAVENOUS; SUBCUTANEOUS at 21:48

## 2019-09-03 RX ADMIN — HYDROCODONE BITARTRATE AND ACETAMINOPHEN 1 TABLET: 5; 325 TABLET ORAL at 05:40

## 2019-09-03 RX ADMIN — ANTI-FUNGAL POWDER MICONAZOLE NITRATE TALC FREE: 1.42 POWDER TOPICAL at 19:58

## 2019-09-03 RX ADMIN — RASAGILINE 1 MG: 1 TABLET ORAL at 08:18

## 2019-09-03 RX ADMIN — CARBIDOPA AND LEVODOPA 1 TABLET: 25; 100 TABLET, EXTENDED RELEASE ORAL at 08:15

## 2019-09-03 RX ADMIN — ROPINIROLE HYDROCHLORIDE 2 MG: 2 TABLET, FILM COATED ORAL at 14:41

## 2019-09-03 RX ADMIN — ROPINIROLE HYDROCHLORIDE 2 MG: 2 TABLET, FILM COATED ORAL at 19:55

## 2019-09-03 RX ADMIN — HYDROCODONE BITARTRATE AND ACETAMINOPHEN 1 TABLET: 5; 325 TABLET ORAL at 19:55

## 2019-09-03 RX ADMIN — CARBIDOPA AND LEVODOPA 1 TABLET: 25; 100 TABLET, EXTENDED RELEASE ORAL at 14:41

## 2019-09-03 RX ADMIN — HEPARIN SODIUM 5000 UNITS: 5000 INJECTION INTRAVENOUS; SUBCUTANEOUS at 14:41

## 2019-09-03 RX ADMIN — IPRATROPIUM BROMIDE AND ALBUTEROL SULFATE 3 ML: .5; 3 SOLUTION RESPIRATORY (INHALATION) at 21:02

## 2019-09-03 RX ADMIN — HYDROCODONE BITARTRATE AND ACETAMINOPHEN 1 TABLET: 5; 325 TABLET ORAL at 11:38

## 2019-09-03 RX ADMIN — ATORVASTATIN CALCIUM 20 MG: 20 TABLET, FILM COATED ORAL at 19:54

## 2019-09-03 RX ADMIN — METOPROLOL TARTRATE 25 MG: 25 TABLET, FILM COATED ORAL at 11:38

## 2019-09-03 RX ADMIN — ACETAMINOPHEN 650 MG: 325 TABLET ORAL at 15:41

## 2019-09-03 RX ADMIN — CALCITRIOL 0.25 MCG: 0.25 CAPSULE ORAL at 08:16

## 2019-09-03 RX ADMIN — AMIODARONE HYDROCHLORIDE 200 MG: 200 TABLET ORAL at 08:17

## 2019-09-03 RX ADMIN — THROMBIN TOPICAL RECOMBINANT 5000 UNITS: KIT at 13:06

## 2019-09-03 RX ADMIN — METOPROLOL TARTRATE 25 MG: 25 TABLET, FILM COATED ORAL at 19:54

## 2019-09-03 RX ADMIN — SENNOSIDES 17.2 MG: 8.6 TABLET, FILM COATED ORAL at 19:54

## 2019-09-03 RX ADMIN — ROPINIROLE HYDROCHLORIDE 2 MG: 2 TABLET, FILM COATED ORAL at 08:16

## 2019-09-03 RX ADMIN — PANTOPRAZOLE SODIUM 40 MG: 40 TABLET, DELAYED RELEASE ORAL at 16:26

## 2019-09-03 RX ADMIN — LINAGLIPTIN 2.5 MG: 5 TABLET, FILM COATED ORAL at 08:18

## 2019-09-03 RX ADMIN — DARBEPOETIN ALFA 60 MCG: 60 INJECTION, SOLUTION INTRAVENOUS; SUBCUTANEOUS at 11:36

## 2019-09-03 RX ADMIN — ASPIRIN 81 MG: 81 TABLET, COATED ORAL at 08:17

## 2019-09-03 RX ADMIN — PANTOPRAZOLE SODIUM 40 MG: 40 TABLET, DELAYED RELEASE ORAL at 05:40

## 2019-09-03 RX ADMIN — CARBIDOPA AND LEVODOPA 1 TABLET: 25; 100 TABLET, EXTENDED RELEASE ORAL at 19:55

## 2019-09-03 RX ADMIN — HEPARIN SODIUM 5000 UNITS: 5000 INJECTION INTRAVENOUS; SUBCUTANEOUS at 05:40

## 2019-09-03 RX ADMIN — CYCLOBENZAPRINE HYDROCHLORIDE 5 MG: 5 TABLET, FILM COATED ORAL at 22:04

## 2019-09-03 RX ADMIN — Medication 5 MG: at 21:50

## 2019-09-03 RX ADMIN — Medication 2 PUFF: at 09:31

## 2019-09-03 RX ADMIN — ANTI-FUNGAL POWDER MICONAZOLE NITRATE TALC FREE: 1.42 POWDER TOPICAL at 08:19

## 2019-09-03 RX ADMIN — IPRATROPIUM BROMIDE AND ALBUTEROL SULFATE 3 ML: .5; 3 SOLUTION RESPIRATORY (INHALATION) at 09:29

## 2019-09-03 RX ADMIN — SODIUM CHLORIDE, PRESERVATIVE FREE 10 ML: 5 INJECTION INTRAVENOUS at 08:19

## 2019-09-03 RX ADMIN — SODIUM CHLORIDE, PRESERVATIVE FREE 10 ML: 5 INJECTION INTRAVENOUS at 19:58

## 2019-09-03 ASSESSMENT — PAIN DESCRIPTION - PROGRESSION

## 2019-09-03 ASSESSMENT — PAIN SCALES - GENERAL
PAINLEVEL_OUTOF10: 8
PAINLEVEL_OUTOF10: 8
PAINLEVEL_OUTOF10: 10
PAINLEVEL_OUTOF10: 8
PAINLEVEL_OUTOF10: 8
PAINLEVEL_OUTOF10: 9
PAINLEVEL_OUTOF10: 0
PAINLEVEL_OUTOF10: 7
PAINLEVEL_OUTOF10: 10

## 2019-09-03 ASSESSMENT — PAIN DESCRIPTION - ONSET
ONSET: ON-GOING

## 2019-09-03 ASSESSMENT — PAIN DESCRIPTION - ORIENTATION
ORIENTATION: RIGHT

## 2019-09-03 ASSESSMENT — PAIN DESCRIPTION - DESCRIPTORS
DESCRIPTORS: ACHING;PRESSURE
DESCRIPTORS: DISCOMFORT;CONSTANT
DESCRIPTORS: DISCOMFORT;CONSTANT
DESCRIPTORS: ACHING

## 2019-09-03 ASSESSMENT — PAIN DESCRIPTION - LOCATION
LOCATION: GROIN
LOCATION: GROIN
LOCATION: LEG
LOCATION: LEG

## 2019-09-03 ASSESSMENT — PAIN DESCRIPTION - FREQUENCY
FREQUENCY: CONTINUOUS
FREQUENCY: INTERMITTENT
FREQUENCY: INTERMITTENT
FREQUENCY: CONTINUOUS

## 2019-09-03 ASSESSMENT — PAIN DESCRIPTION - PAIN TYPE
TYPE: SURGICAL PAIN
TYPE: ACUTE PAIN

## 2019-09-03 ASSESSMENT — PAIN - FUNCTIONAL ASSESSMENT: PAIN_FUNCTIONAL_ASSESSMENT: PREVENTS OR INTERFERES SOME ACTIVE ACTIVITIES AND ADLS

## 2019-09-03 NOTE — FLOWSHEET NOTE
Dr. Vladimir Martinez arrived to the patients bedside and was updated on the patients current status via RN. RLE doppler reviewed per Dr. Samia Suggs. New order received for Dr. Samia Suggs to inject thrombin into pts right femoral pseudoaneurysm at the bedside. Consent obtained and placed in patients chart. Procedure completed at the bedside under US and patient tolerated well. New order received for rt femoral US to be completed tomorrow am. Pt to remain supine for one hour and on bedrest until completion of repeat of right femoral US tomorrow am. Pt instructed on activity restrictions and verbalizes understanding. Will continue to monitor closely.

## 2019-09-03 NOTE — PLAN OF CARE
Problem: Falls - Risk of:  Goal: Absence of physical injury  Description  Absence of physical injury  Outcome: Met This Shift     Problem: Falls - Risk of:  Goal: Will remain free from falls  Description  Will remain free from falls  Outcome: Ongoing     Problem: Pain:  Goal: Pain level will decrease  Description  Pain level will decrease  Outcome: Ongoing  Goal: Control of acute pain  Description  Control of acute pain  Outcome: Ongoing     Problem: Risk for Impaired Skin Integrity  Goal: Tissue integrity - skin and mucous membranes  Description  Structural intactness and normal physiological function of skin and  mucous membranes.   Outcome: Ongoing

## 2019-09-03 NOTE — PROGRESS NOTES
Pulmonary Critical Care Progress Note       Patient seen for the follow up of chest pain, respiratory failure, ECTOR    Subjective:  She has been on oxygen at 2 L nasal cannula at home dose. She has used her home CPAP for sleep. She denies any chest pain. Her shortness of breath is at her baseline. She reports right groin pain . She had thrombin injected to right groin by vascular at bed side . No chest pain today    Length of stay: 7 Days    Examination:  Vitals: BP (!) 108/38   Pulse 71   Temp 96.8 °F (36 °C) (Temporal)   Resp 16   Ht 5' (1.524 m)   Wt 196 lb 3.2 oz (89 kg)   LMP 04/03/2004 (Within Years)   SpO2 100%   BMI 38.32 kg/m²     General appearance: alert and cooperative with exam  Neck: No JVD  Lungs: Decreased breath sound no crackles or wheezing  Heart: regular rate and rhythm, S1, S2 normal, no gallop  Abdomen: Soft, non tender, + BS  Extremities: no cyanosis or clubbing. No significant edema right graoin hematoma    LABs:  CBC:   Recent Labs     09/02/19  0510 09/02/19  1103 09/03/19  0542   WBC 8.9  --  8.9   HGB 6.8* 8.7* 7.6*   HCT 22.8* 27.9* 25.1*   *  --  116*     BMP:   Recent Labs     09/02/19  0510 09/03/19  0542    142   K 5.2 5.4*   CO2 25 30   BUN 49* 45*   CREATININE 1.92* 1.93*   LABGLOM 26* 26*   GLUCOSE 115* 133*     PT/INR:   Recent Labs     09/01/19 2011   PROTIME 9.6*   INR 0.9     APTT:  Recent Labs     09/01/19 2011   APTT 22.4*     Impression:  · Chest pain/abnormal stress test s/p cardiac cath 8/30/19 with non-obstructive CAD   · Chronic respiratory failure  · Chronic diastolic heart failure  · Atelectasis  · COPD/30-pack-year smoking history  · KRISTIN on CKD, improving   · A.  Fib  · Obstructive sleep apnea/obesity  · Anemia  · Right groin hematoma/ femoral artery pseudoaneurysm    Recommendations:  · O2 2 liters/min via nasal cannula  · Incentive spirometry every hour while awake  · BiPAP for sleep and as needed   · Albuterol and Ipratropium Q 8 hours and prn  · Dulera 200  · Nephrology on consult  · Monitor urine output and blood pressure  · cardiology on consult  · Vascular on consult  · Monitor H&H  · Discussed with  and RN   · DVT prophylaxis      Erika Carter MD on 9/3/2019 at 5:17 PM  Pulmonary Critical Care and Sleep Medicine,  [facility] AT [place]: [phone]

## 2019-09-03 NOTE — PROGRESS NOTES
sodium chloride flush  10 mL Intravenous 2 times per day    metoprolol tartrate  25 mg Oral BID    miconazole   Topical BID    ipratropium-albuterol  3 mL Inhalation TID    darbepoetin albaro-polysorbate  60 mcg Subcutaneous Weekly    calcitRIOL  0.25 mcg Oral Daily    cyanocobalamin  1,000 mcg Intramuscular Q30 Days    aspirin  324 mg Oral Once    amiodarone  200 mg Oral Daily    atorvastatin  20 mg Oral Daily    mometasone-formoterol  2 puff Inhalation BID    carbidopa-levodopa  1 tablet Oral TID    linagliptin  2.5 mg Oral Daily    senna  2 tablet Oral Nightly    rOPINIRole  2 mg Oral TID    insulin lispro  0-6 Units Subcutaneous TID WC    insulin lispro  0-3 Units Subcutaneous Nightly    heparin (porcine)  5,000 Units Subcutaneous 3 times per day    aspirin  81 mg Oral Daily    pantoprazole  40 mg Oral BID AC    rasagiline mesylate  1 mg Oral Daily     Continuous Infusions:   dextrose         Assessment/ Plan :   Right groin pseudoaneurysm. Non-obstructive CAD. Paroxysmal atrial fibrillation. Hypertension. Acute on chronic anemia. Acute on chronic renal failure. Continue current medications. Management of Pseudoaneurysm per vascular. Thank you very much for allowing us to participate in the care of this patient. Please call us with any questions.     Electronically signed by Robert Cheung MD on 9/3/2019 at 8:50 AM

## 2019-09-03 NOTE — PROGRESS NOTES
BILITOT 0.16 09/01/2019    ALKPHOS 67 06/05/2019    AST 17 06/05/2019    ALT <5 06/05/2019        Assessment:  Patient Active Problem List   Diagnosis    Controlled type 2 diabetes mellitus with stage 3 chronic kidney disease, without long-term current use of insulin ([facility])    Hyperlipidemia    Essential hypertension    Chronic leg pain    Paroxysmal atrial fibrillation ([facility])    Asthma    Gastroesophageal reflux disease without esophagitis    ECTOR on CPAP    Type 2 diabetes mellitus with complication, without long-term current use of insulin ([facility])    Spinal stenosis in cervical region    Hypomagnesemia    Hyperphosphaturia    Hypocalcemia    Parkinson's disease ([address]    Acute renal failure ([address]    Acute cystitis with hematuria    Altered mental status    Iron deficiency anemia due to chronic blood loss    Morbid obesity with BMI of 40.0-44.9, adult ([facility])    Hyperplastic polyp of intestine    Diverticulosis    Panlobular emphysema ([facility])    Rapid atrial fibrillation ([facility])    CKD (chronic kidney disease) stage 3, GFR 30-59 ml/min ([facility])    KRISTIN (acute kidney injury) ([address]    Anemia in chronic kidney disease    Chronic respiratory failure with hypoxia and hypercapnia ([facility])    Acute kidney injury superimposed on chronic kidney disease ([address]    CKD stage 4 due to type 2 diabetes mellitus ([address]    E. coli UTI (urinary tract infection)    Nephrolithiasis    Candidal intertrigo    Venous insufficiency    Malabsorption    Anemia of chronic renal failure, stage 4 (severe) ([facility])    Iron deficiency    Coronary atherosclerosis    COPD exacerbation ([facility])    Restless leg syndrome    SIRS (systemic inflammatory response syndrome) ([facility])    Nausea and vomiting in adult    Bacterial UTI    Odynophagia    Esophageal dysphagia    Candida esophagitis ([facility])    Sepsis due to urinary tract infection ([facility])    Chronic respiratory failure with hypoxia ([facility])    Chronic diastolic congestive heart

## 2019-09-03 NOTE — PROGRESS NOTES
Occupational [address]  Occupational Therapy Not Seen Note    Patient not available for Occupational Therapy due to:    [] Testing:    [] Hemodialysis    [x] Cancelled by RN: d/t thrombin injection.     []Refusal by Patient:    [] Surgery:     [] Intubation:     [] Pain Medication:    [] Sedation:     [] Spine Precautions :    [] Medical Instability:    [] Other:

## 2019-09-03 NOTE — PROGRESS NOTES
injection vial 0-6 Units  0-6 Units Subcutaneous TID WC Sushma Kothari MD   2 Units at 08/31/19 1241    insulin lispro (HUMALOG) injection vial 0-3 Units  0-3 Units Subcutaneous Nightly Sushma Kothari MD   1 Units at 09/01/19 2028    albuterol (PROVENTIL) nebulizer solution 2.5 mg  2.5 mg Nebulization As Directed RT PRN Sushma Kothari MD        heparin (porcine) injection 5,000 Units  5,000 Units Subcutaneous 3 times per day Sushma Kothari MD   5,000 Units at 09/03/19 0540    Melatonin CHEW 5 mg  5 mg Oral Nightly PRN Sushma Kothari MD   5 mg at 09/02/19 2210    aspirin EC tablet 81 mg  81 mg Oral Daily Sushma Kothari MD   81 mg at 09/03/19 0817    pantoprazole (PROTONIX) tablet 40 mg  40 mg Oral BID AC Sushma Kothari MD   40 mg at 09/03/19 0540    cyclobenzaprine (FLEXERIL) tablet 5 mg  5 mg Oral Nightly PRN Sushma Kothari MD   5 mg at 08/30/19 1640    rasagiline mesylate TABS 1 mg  1 mg Oral Daily Sushma Kothari MD   1 mg at 09/03/19 0818       Allergies:  Dye [barium-containing compounds]; Pcn [penicillins]; and Bactrim [sulfamethoxazole-trimethoprim]    Social History:   reports that she quit smoking about 48 years ago. Her smoking use included cigarettes. She has a 30.00 pack-year smoking history. She has never used smokeless tobacco. She reports that she drinks about 2.0 standard drinks of alcohol per week. She reports that she does not use drugs. Family History: family history includes Heart Disease in her mother; Heart Failure in her mother; High Blood Pressure in her mother; Hypertension in her mother. REVIEW OF SYSTEMS:    Constitutional: No fever or chills.  No night sweats, no weight loss   Eyes: No eye discharge, double vision, or eye pain   HEENT: negative for sore mouth, sore throat, hoarseness and voice change   Respiratory: negative for cough , sputum, dyspnea, wheezing, hemoptysis, chest pain   Cardiovascular: + chest pain, dyspnea, palpitations, orthopnea, PND

## 2019-09-03 NOTE — PROGRESS NOTES
Progress  Note    Reason for Consult:  KRISTIN    Interval History    Patient seen and examined at the bedside  She had following procedure    Procedure: Thrombin injection right femoral pseudoaneurysm   Ultrasound imaging and compression of right femoral pseudoaneurysm     Denies any dyspnea. She does have bilateral lower extremity edema    Results for Rafia Doll (MRN [id number]) as of 9/2/2019 15:58   Ref. Range 8/29/2019 06:34 8/30/2019 04:46 8/31/2019 03:54 9/1/2019 04:45 9/2/2019 05:10   Creatinine Latest Ref Range: 0.50 - 0.90 mg/dL 2.06 (H) 2.25 (H) 2.37 (H) 2.52 (H) 1.92 (H)       HISTORY OF PRESENT ILLNESS:    The patient is a 69 y.o. female who presents with chest pain and SOB, which started on Sunday. Nuclear Stress test showed reversible ischemia. Cardiac cath planned. Creatinine is elevated at 2.89 today, which increased from 2.4 yesterday. Baseline creatinine CKD 4 with baseline creatinine around 1.7-1.9. She states she was recently treated for UTI on bactrim, which she completed over the weekend. K trending 5.1-5.2 during admission. Bicarb level stable. Home meds include lasix. She thinks her weight has been increasing at home. Hx of hypoacalcemia. Ca level ok. She is on vitamin D,  ca supplementation, and calcitriol. Hx of anemia on EMMY outpatient. She c/o leg cramps and feeling of hypocalcemia recently. She increased ca supplementation, symptoms resolved, and she resumed her usual dosages. She denies CP, SOB currently. C/o lightheadedness recently. She does not monitor BP at home. Hypotensive yesterday with SBP 90s. BP is better today. Hx of afib. HR has been stable. She is on lasix and IVF. Excellent urine output. CXR did not show CHF, but did show atelectasis. Prior to Admission medications    Medication Sig Start Date End Date Taking?  Authorizing Provider   darbepoetin albaro-polysorbate (ARANESP) 200 MCG/0.4ML SOSY injection Inject 200 mcg into the skin every 28 days   Yes Historical Reviewed. Assessment:  1. Acute Kidney Injury-multifactorial       A. Bactrim       B. Vasomotor  2. CKD 4  3. Hyperkalemia  4. HTN with recent hypotension  5. Abnormal stress test  6. Anemia of chronic disease  7. Right groin pseudoanerysm    Plan:    Serum creatinine is stable   Hemodynamics stable   Diuretics soon   Low K diet. Monitor renal function daily. Avoid nephrotoxic drugs, fleet's enemas, NSAIDs. Adequate Iron studies. Continue aranesp. Hematology following    [address] [facility].   Ph: [phone]

## 2019-09-03 NOTE — PROGRESS NOTES
precautions in place     Therapy Time   Individual Concurrent Group Co-treatment   Time In [facility]         Time Out 1104         Minutes [address], Student Physical Therapist Assistant

## 2019-09-04 LAB
-: ABNORMAL
ABSOLUTE EOS #: 0.14 K/UL (ref 0–0.44)
ABSOLUTE IMMATURE GRANULOCYTE: 0.17 K/UL (ref 0–0.3)
ABSOLUTE LYMPH #: 1.15 K/UL (ref 1.1–3.7)
ABSOLUTE MONO #: 1.12 K/UL (ref 0.1–1.2)
AMORPHOUS: ABNORMAL
ANION GAP SERPL CALCULATED.3IONS-SCNC: 11 MMOL/L (ref 9–17)
BACTERIA: ABNORMAL
BASOPHILS # BLD: 0 % (ref 0–2)
BASOPHILS ABSOLUTE: 0.03 K/UL (ref 0–0.2)
BILIRUBIN URINE: NEGATIVE
BUN BLDV-MCNC: 39 MG/DL (ref 8–23)
BUN/CREAT BLD: 25 (ref 9–20)
CALCIUM SERPL-MCNC: 8 MG/DL (ref 8.6–10.4)
CASTS UA: ABNORMAL /LPF
CHLORIDE BLD-SCNC: 100 MMOL/L (ref 98–107)
CO2: 27 MMOL/L (ref 20–31)
COLOR: YELLOW
COMMENT UA: ABNORMAL
CREAT SERPL-MCNC: 1.58 MG/DL (ref 0.5–0.9)
CRYSTALS, UA: ABNORMAL /HPF
DIFFERENTIAL TYPE: ABNORMAL
EOSINOPHILS RELATIVE PERCENT: 1 % (ref 1–4)
EPITHELIAL CELLS UA: ABNORMAL /HPF (ref 0–5)
GFR AFRICAN AMERICAN: 39 ML/MIN
GFR NON-AFRICAN AMERICAN: 32 ML/MIN
GFR SERPL CREATININE-BSD FRML MDRD: ABNORMAL ML/MIN/{1.73_M2}
GFR SERPL CREATININE-BSD FRML MDRD: ABNORMAL ML/MIN/{1.73_M2}
GLUCOSE BLD-MCNC: 111 MG/DL (ref 65–105)
GLUCOSE BLD-MCNC: 112 MG/DL (ref 65–105)
GLUCOSE BLD-MCNC: 118 MG/DL (ref 70–99)
GLUCOSE BLD-MCNC: 130 MG/DL (ref 65–105)
GLUCOSE BLD-MCNC: 143 MG/DL (ref 65–105)
GLUCOSE URINE: NEGATIVE
HCT VFR BLD CALC: 23.8 % (ref 36.3–47.1)
HEMOGLOBIN: 7.2 G/DL (ref 11.9–15.1)
IMMATURE GRANULOCYTES: 2 %
KETONES, URINE: NEGATIVE
LEUKOCYTE ESTERASE, URINE: ABNORMAL
LYMPHOCYTES # BLD: 11 % (ref 24–43)
MCH RBC QN AUTO: 30.6 PG (ref 25.2–33.5)
MCHC RBC AUTO-ENTMCNC: 30.3 G/DL (ref 28.4–34.8)
MCV RBC AUTO: 101.3 FL (ref 82.6–102.9)
MONOCYTES # BLD: 11 % (ref 3–12)
MUCUS: ABNORMAL
NITRITE, URINE: NEGATIVE
NRBC AUTOMATED: 0 PER 100 WBC
OTHER OBSERVATIONS UA: ABNORMAL
PDW BLD-RTO: 17.1 % (ref 11.8–14.4)
PH UA: 6 (ref 5–8)
PLATELET # BLD: 114 K/UL (ref 138–453)
PLATELET ESTIMATE: ABNORMAL
PMV BLD AUTO: 10.8 FL (ref 8.1–13.5)
POTASSIUM SERPL-SCNC: 4.9 MMOL/L (ref 3.7–5.3)
PROTEIN UA: NEGATIVE
RBC # BLD: 2.35 M/UL (ref 3.95–5.11)
RBC # BLD: ABNORMAL 10*6/UL
RBC UA: ABNORMAL /HPF (ref 0–2)
RENAL EPITHELIAL, UA: ABNORMAL /HPF
SEG NEUTROPHILS: 74 % (ref 36–65)
SEGMENTED NEUTROPHILS ABSOLUTE COUNT: 7.47 K/UL (ref 1.5–8.1)
SODIUM BLD-SCNC: 138 MMOL/L (ref 135–144)
SPECIFIC GRAVITY UA: 1.02 (ref 1–1.03)
TRICHOMONAS: ABNORMAL
TURBIDITY: CLEAR
URINE HGB: ABNORMAL
UROBILINOGEN, URINE: NORMAL
WBC # BLD: 10.1 K/UL (ref 3.5–11.3)
WBC # BLD: ABNORMAL 10*3/UL
WBC UA: ABNORMAL /HPF (ref 0–5)
YEAST: ABNORMAL

## 2019-09-04 PROCEDURE — 6370000000 HC RX 637 (ALT 250 FOR IP): Performed by: INTERNAL MEDICINE

## 2019-09-04 PROCEDURE — 2700000000 HC OXYGEN THERAPY PER DAY

## 2019-09-04 PROCEDURE — 93926 LOWER EXTREMITY STUDY: CPT

## 2019-09-04 PROCEDURE — 36415 COLL VENOUS BLD VENIPUNCTURE: CPT

## 2019-09-04 PROCEDURE — 94640 AIRWAY INHALATION TREATMENT: CPT

## 2019-09-04 PROCEDURE — 81001 URINALYSIS AUTO W/SCOPE: CPT

## 2019-09-04 PROCEDURE — 2060000000 HC ICU INTERMEDIATE R&B

## 2019-09-04 PROCEDURE — 87186 SC STD MICRODIL/AGAR DIL: CPT

## 2019-09-04 PROCEDURE — 99232 SBSQ HOSP IP/OBS MODERATE 35: CPT | Performed by: INTERNAL MEDICINE

## 2019-09-04 PROCEDURE — 87077 CULTURE AEROBIC IDENTIFY: CPT

## 2019-09-04 PROCEDURE — 92610 EVALUATE SWALLOWING FUNCTION: CPT

## 2019-09-04 PROCEDURE — 6360000002 HC RX W HCPCS: Performed by: INTERNAL MEDICINE

## 2019-09-04 PROCEDURE — 94760 N-INVAS EAR/PLS OXIMETRY 1: CPT

## 2019-09-04 PROCEDURE — 2580000003 HC RX 258: Performed by: INTERNAL MEDICINE

## 2019-09-04 PROCEDURE — 82947 ASSAY GLUCOSE BLOOD QUANT: CPT

## 2019-09-04 PROCEDURE — 85025 COMPLETE CBC W/AUTO DIFF WBC: CPT

## 2019-09-04 PROCEDURE — 80048 BASIC METABOLIC PNL TOTAL CA: CPT

## 2019-09-04 PROCEDURE — 87086 URINE CULTURE/COLONY COUNT: CPT

## 2019-09-04 RX ORDER — CYCLOBENZAPRINE HCL 5 MG
5 TABLET ORAL 2 TIMES DAILY PRN
Status: DISCONTINUED | OUTPATIENT
Start: 2019-09-04 | End: 2019-09-11 | Stop reason: HOSPADM

## 2019-09-04 RX ORDER — POLYETHYLENE GLYCOL 3350 17 G/17G
17 POWDER, FOR SOLUTION ORAL DAILY PRN
Status: DISCONTINUED | OUTPATIENT
Start: 2019-09-04 | End: 2019-09-11 | Stop reason: HOSPADM

## 2019-09-04 RX ADMIN — IPRATROPIUM BROMIDE AND ALBUTEROL SULFATE 3 ML: .5; 3 SOLUTION RESPIRATORY (INHALATION) at 19:41

## 2019-09-04 RX ADMIN — LINAGLIPTIN 2.5 MG: 5 TABLET, FILM COATED ORAL at 10:08

## 2019-09-04 RX ADMIN — HYDROCODONE BITARTRATE AND ACETAMINOPHEN 1 TABLET: 5; 325 TABLET ORAL at 18:21

## 2019-09-04 RX ADMIN — ANTI-FUNGAL POWDER MICONAZOLE NITRATE TALC FREE: 1.42 POWDER TOPICAL at 21:42

## 2019-09-04 RX ADMIN — AMIODARONE HYDROCHLORIDE 200 MG: 200 TABLET ORAL at 10:07

## 2019-09-04 RX ADMIN — HEPARIN SODIUM 5000 UNITS: 5000 INJECTION INTRAVENOUS; SUBCUTANEOUS at 22:10

## 2019-09-04 RX ADMIN — HEPARIN SODIUM 5000 UNITS: 5000 INJECTION INTRAVENOUS; SUBCUTANEOUS at 05:28

## 2019-09-04 RX ADMIN — SODIUM CHLORIDE, PRESERVATIVE FREE 10 ML: 5 INJECTION INTRAVENOUS at 21:42

## 2019-09-04 RX ADMIN — CYCLOBENZAPRINE HYDROCHLORIDE 5 MG: 5 TABLET, FILM COATED ORAL at 22:10

## 2019-09-04 RX ADMIN — Medication 5 MG: at 22:10

## 2019-09-04 RX ADMIN — SODIUM CHLORIDE, PRESERVATIVE FREE 10 ML: 5 INJECTION INTRAVENOUS at 10:09

## 2019-09-04 RX ADMIN — ATORVASTATIN CALCIUM 20 MG: 20 TABLET, FILM COATED ORAL at 21:15

## 2019-09-04 RX ADMIN — HYDROCODONE BITARTRATE AND ACETAMINOPHEN 1 TABLET: 5; 325 TABLET ORAL at 06:02

## 2019-09-04 RX ADMIN — METOPROLOL TARTRATE 25 MG: 25 TABLET, FILM COATED ORAL at 10:22

## 2019-09-04 RX ADMIN — ROPINIROLE HYDROCHLORIDE 2 MG: 2 TABLET, FILM COATED ORAL at 13:57

## 2019-09-04 RX ADMIN — ROPINIROLE HYDROCHLORIDE 2 MG: 2 TABLET, FILM COATED ORAL at 10:08

## 2019-09-04 RX ADMIN — CALCITRIOL 0.25 MCG: 0.25 CAPSULE ORAL at 10:07

## 2019-09-04 RX ADMIN — IPRATROPIUM BROMIDE AND ALBUTEROL SULFATE 3 ML: .5; 3 SOLUTION RESPIRATORY (INHALATION) at 09:42

## 2019-09-04 RX ADMIN — HYDROCODONE BITARTRATE AND ACETAMINOPHEN 1 TABLET: 5; 325 TABLET ORAL at 12:08

## 2019-09-04 RX ADMIN — INSULIN LISPRO 1 UNITS: 100 INJECTION, SOLUTION INTRAVENOUS; SUBCUTANEOUS at 21:15

## 2019-09-04 RX ADMIN — CARBIDOPA AND LEVODOPA 1 TABLET: 25; 100 TABLET, EXTENDED RELEASE ORAL at 21:15

## 2019-09-04 RX ADMIN — RASAGILINE 1 MG: 1 TABLET ORAL at 10:13

## 2019-09-04 RX ADMIN — ROPINIROLE HYDROCHLORIDE 2 MG: 2 TABLET, FILM COATED ORAL at 21:15

## 2019-09-04 RX ADMIN — PANTOPRAZOLE SODIUM 40 MG: 40 TABLET, DELAYED RELEASE ORAL at 17:18

## 2019-09-04 RX ADMIN — CYCLOBENZAPRINE HYDROCHLORIDE 5 MG: 5 TABLET, FILM COATED ORAL at 10:22

## 2019-09-04 RX ADMIN — CARBIDOPA AND LEVODOPA 1 TABLET: 25; 100 TABLET, EXTENDED RELEASE ORAL at 13:56

## 2019-09-04 RX ADMIN — POLYETHYLENE GLYCOL 3350 17 G: 17 POWDER, FOR SOLUTION ORAL at 17:18

## 2019-09-04 RX ADMIN — ASPIRIN 81 MG: 81 TABLET, COATED ORAL at 10:07

## 2019-09-04 RX ADMIN — IPRATROPIUM BROMIDE AND ALBUTEROL SULFATE 3 ML: .5; 3 SOLUTION RESPIRATORY (INHALATION) at 15:56

## 2019-09-04 RX ADMIN — CARBIDOPA AND LEVODOPA 1 TABLET: 25; 100 TABLET, EXTENDED RELEASE ORAL at 10:07

## 2019-09-04 RX ADMIN — METOPROLOL TARTRATE 25 MG: 25 TABLET, FILM COATED ORAL at 21:15

## 2019-09-04 RX ADMIN — HEPARIN SODIUM 5000 UNITS: 5000 INJECTION INTRAVENOUS; SUBCUTANEOUS at 13:57

## 2019-09-04 RX ADMIN — PANTOPRAZOLE SODIUM 40 MG: 40 TABLET, DELAYED RELEASE ORAL at 05:28

## 2019-09-04 RX ADMIN — SENNOSIDES 17.2 MG: 8.6 TABLET, FILM COATED ORAL at 21:15

## 2019-09-04 ASSESSMENT — PAIN SCALES - GENERAL
PAINLEVEL_OUTOF10: 9
PAINLEVEL_OUTOF10: 0
PAINLEVEL_OUTOF10: 10
PAINLEVEL_OUTOF10: 8
PAINLEVEL_OUTOF10: 10
PAINLEVEL_OUTOF10: 0
PAINLEVEL_OUTOF10: 8
PAINLEVEL_OUTOF10: 0

## 2019-09-04 ASSESSMENT — PAIN DESCRIPTION - PROGRESSION
CLINICAL_PROGRESSION: NOT CHANGED

## 2019-09-04 ASSESSMENT — PAIN DESCRIPTION - FREQUENCY: FREQUENCY: INTERMITTENT

## 2019-09-04 ASSESSMENT — PAIN DESCRIPTION - ORIENTATION
ORIENTATION: RIGHT
ORIENTATION: RIGHT

## 2019-09-04 ASSESSMENT — PAIN DESCRIPTION - DESCRIPTORS
DESCRIPTORS: ACHING
DESCRIPTORS: SPASM

## 2019-09-04 ASSESSMENT — PAIN DESCRIPTION - LOCATION
LOCATION: LEG
LOCATION: GROIN

## 2019-09-04 ASSESSMENT — PAIN DESCRIPTION - ONSET: ONSET: ON-GOING

## 2019-09-04 ASSESSMENT — PAIN DESCRIPTION - PAIN TYPE: TYPE: SURGICAL PAIN

## 2019-09-04 NOTE — PROGRESS NOTES
Historical Provider, MD   budesonide-formoterol (SYMBICORT) 80-4.5 MCG/ACT AERO Inhale 2 puffs into the lungs 2 times daily    Historical Provider, MD   ipratropium-albuterol (DUONEB) 0.5-2.5 (3) MG/3ML SOLN nebulizer solution Inhale 3 mLs into the lungs three times daily 3/20/19   Lesly Hill, DO   ONETOUCH VERIO strip 1 each by In Vitro route daily As needed. 3/4/19   Lesly Hill, DO   ONE TOUCH ULTRA TEST strip USE 1 STRIP THREE TIMES A DAY 12/26/18   Lesly Hill, DO   albuterol (ACCUNEB) 1.25 MG/3ML nebulizer solution Inhale 1 ampule into the lungs every 6 hours as needed for Wheezing or Shortness of Breath    Historical Provider, MD   Vernadine Dynes LANCETS 88U MISC 1 Units by Does not apply route 2 times daily 10/23/18   Lesly Hill, DO   magnesium oxide (MAG-OX) 400 (241.3 Mg) MG TABS tablet Take 1 tablet by mouth 2 times daily 9/7/18   Clearence Casco Orlop, DO   vitamin D (CHOLECALCIFEROL) 5000 units CAPS capsule Take 5,000 Units by mouth daily    Historical Provider, MD   Oxygen Concentrator Dx: Pneumonia, use as directed. Patient taking differently: Dx: Pneumonia, use as directed.    ON 24/7 2/10/17   Alphonso Hill,        Scheduled Meds:   sodium chloride  250 mL Intravenous Once    sodium chloride flush  10 mL Intravenous 2 times per day    metoprolol tartrate  25 mg Oral BID    miconazole   Topical BID    ipratropium-albuterol  3 mL Inhalation TID    darbepoetin albaro-polysorbate  60 mcg Subcutaneous Weekly    calcitRIOL  0.25 mcg Oral Daily    cyanocobalamin  1,000 mcg Intramuscular Q30 Days    aspirin  324 mg Oral Once    amiodarone  200 mg Oral Daily    atorvastatin  20 mg Oral Daily    mometasone-formoterol  2 puff Inhalation BID    carbidopa-levodopa  1 tablet Oral TID    linagliptin  2.5 mg Oral Daily    senna  2 tablet Oral Nightly    rOPINIRole  2 mg Oral TID    insulin lispro  0-6 Units Subcutaneous TID WC    insulin lispro  0-3 Units Subcutaneous Nightly    heparin (porcine)  5,000 Units Subcutaneous 3 times per day    aspirin  81 mg Oral Daily    pantoprazole  40 mg Oral BID AC    rasagiline mesylate  1 mg Oral Daily     Continuous Infusions:   dextrose       PRN Meds:HYDROcodone 5 mg - acetaminophen, sodium chloride flush, acetaminophen, magnesium hydroxide, ondansetron **OR** ondansetron, morphine, glucose, dextrose, glucagon (rDNA), dextrose, albuterol, Melatonin, cyclobenzaprine    Physical Exam:  Vitals:    09/03/19 2000 09/04/19 0000 09/04/19 0400 09/04/19 0943   BP: (!) 120/45 (!) 100/43 (!) 112/49    Pulse: 73 75 75    Resp: 16 16 16 16   Temp: 97.3 °F (36.3 °C) 97.6 °F (36.4 °C) 97.4 °F (36.3 °C)    TempSrc: Temporal Temporal Temporal    SpO2: 100% 100% 98% 98%   Weight:   191 lb 4.8 oz (86.8 kg)    Height:         I/O last 3 completed shifts: In: 18 [P.O.:560; I.V.:10]  Out: 100 [Urine:100]    General:  Awake, alert, not in distress. Appears to be stated age. HEENT: Atraumatic, normocephalic. Anicteric sclera. Pink and moist oral mucosa. Neck supple. No JVD. Chest: Crackles bibasilar, diminished bilaterally. Cardiovascular: RRR, S1S2, no murmur, rub or gallop. +2 right lower extremity edema. Abdomen: Soft, non tender to palpation. Musculoskeletal:   No cyanosis or clubbing. Integumentary: Pink, warm and dry. Free from rash or lesions. Skin turgor normal.  CNS: Oriented to person, place and time. Speech clear. Face symmetrical. No tremor.      Data:    CBC:   Lab Results   Component Value Date    WBC 10.1 09/04/2019    HGB 7.2 (L) 09/04/2019    HCT 23.8 (L) 09/04/2019    .3 09/04/2019     (L) 09/04/2019     BMP:    Lab Results   Component Value Date     09/04/2019     09/03/2019     09/02/2019    K 4.9 09/04/2019    K 5.4 (H) 09/03/2019    K 5.2 09/02/2019     09/04/2019     09/03/2019     09/02/2019    CO2 27 09/04/2019    CO2 30 09/03/2019    CO2 25 09/02/2019    BUN 39 (H) 09/04/2019    BUN 45 (H)

## 2019-09-04 NOTE — PROGRESS NOTES
by In Vitro route daily As needed. 3/4/19   Lesly Hill DO   ONE TOUCH ULTRA TEST strip USE 1 STRIP THREE TIMES A DAY 12/26/18   Lesly Hill DO   albuterol (ACCUNEB) 1.25 MG/3ML nebulizer solution Inhale 1 ampule into the lungs every 6 hours as needed for Wheezing or Shortness of Breath    Historical Provider, MD Sergio Soto LANCETS 18Z MISC 1 Units by Does not apply route 2 times daily 10/23/18   Lesly Hill DO   magnesium oxide (MAG-OX) 400 (241.3 Mg) MG TABS tablet Take 1 tablet by mouth 2 times daily 9/7/18   Blaze Marcos DO   vitamin D (CHOLECALCIFEROL) 5000 units CAPS capsule Take 5,000 Units by mouth daily    Historical Provider, MD   Oxygen Concentrator Dx: Pneumonia, use as directed. Patient taking differently: Dx: Pneumonia, use as directed.    ON 24/7 2/10/17   [facility] DO Sergio     Current Facility-Administered Medications   Medication Dose Route Frequency Provider Last Rate Last Dose    cyclobenzaprine (FLEXERIL) tablet 5 mg  5 mg Oral BID PRN Conrad Aaron MD   5 mg at 09/04/19 1022    0.9 % sodium chloride bolus  250 mL Intravenous Once Conrad Aaron MD        HYDROcodone-acetaminophen (NORCO) 5-325 MG per tablet 1 tablet  1 tablet Oral Q6H PRN Conrad Aaron MD   1 tablet at 09/04/19 1208    sodium chloride flush 0.9 % injection 10 mL  10 mL Intravenous 2 times per day Puja Dutta MD   10 mL at 09/04/19 1009    sodium chloride flush 0.9 % injection 10 mL  10 mL Intravenous PRN Puja Dutta MD        acetaminophen (TYLENOL) tablet 650 mg  650 mg Oral Q4H PRN Puja Dutta MD   650 mg at 09/03/19 1541    ondansetron (ZOFRAN-ODT) disintegrating tablet 4 mg  4 mg Oral Q6H PRN Puja Dutta MD        Or    [facility] injection 4 mg  4 mg Intravenous Q6H PRN Puja Dutta MD   4 mg at 09/01/19 1940    morphine (PF) injection 1 mg  1 mg Intravenous Q8H PRN ANSHUL Chaudhry CNP   1 mg at 09/02/19 4629    metoprolol tartrate (LOPRESSOR) tablet 25

## 2019-09-04 NOTE — PROGRESS NOTES
Occupational [address]  Occupational Therapy Not Seen Note    Patient not available for Occupational Therapy due to:    [] Testing:    [] Hemodialysis    [x] Cancelled by RN: cancel per RN d/t strict bedrest order, will check back later today.     []Refusal by Patient:    [] Surgery:     [] Intubation:     [] Pain Medication:    [] Sedation:     [] Spine Precautions :    [] Medical Instability:    [] Other:

## 2019-09-04 NOTE — PROGRESS NOTES
Speech Language Pathology  Facility/Department: [facility] CVICU   CLINICAL BEDSIDE SWALLOW EVALUATION    NAME: Abdon Hutchinson  : 1945  MRN: [id number]    ADMISSION DATE: 2019  ADMITTING DIAGNOSIS: has Controlled type 2 diabetes mellitus with stage 3 chronic kidney disease, without long-term current use of insulin ([address] Hyperlipidemia; Essential hypertension; Chronic leg pain; Paroxysmal atrial fibrillation ([address] Asthma; Gastroesophageal reflux disease without esophagitis; ECTOR on CPAP; Type 2 diabetes mellitus with complication, without long-term current use of insulin ([address] Spinal stenosis in cervical region; Hypomagnesemia; Hyperphosphaturia; Hypocalcemia; Parkinson's disease ([address] Acute renal failure ([address] Acute cystitis with hematuria; Altered mental status; Iron deficiency anemia due to chronic blood loss; Morbid obesity with BMI of 40.0-44.9, adult ([address] Hyperplastic polyp of intestine; Diverticulosis; Panlobular emphysema ([address] Rapid atrial fibrillation (HCC); CKD (chronic kidney disease) stage 3, GFR 30-59 ml/min ([address] KRISTIN (acute kidney injury) ([address] Anemia in chronic kidney disease; Chronic respiratory failure with hypoxia and hypercapnia ([address] Acute kidney injury superimposed on chronic kidney disease ([address] CKD stage 4 due to type 2 diabetes mellitus ([address] E. coli UTI (urinary tract infection); Nephrolithiasis; Candidal intertrigo; Venous insufficiency; Malabsorption; Anemia of chronic renal failure, stage 4 (severe) ([address] Iron deficiency; Coronary atherosclerosis; COPD exacerbation ([address] Restless leg syndrome; SIRS (systemic inflammatory response syndrome) ([address] Nausea and vomiting in adult; Bacterial UTI; Odynophagia; Esophageal dysphagia; Candida esophagitis ([address] Sepsis due to urinary tract infection ([address] Chronic respiratory failure with hypoxia ([address] Chronic diastolic congestive heart failure ([address]  History of ESBL E. coli infection; Stage 4 chronic kidney disease ([facility]

## 2019-09-05 LAB
ABSOLUTE EOS #: 0.12 K/UL (ref 0–0.44)
ABSOLUTE IMMATURE GRANULOCYTE: 0.13 K/UL (ref 0–0.3)
ABSOLUTE LYMPH #: 1.23 K/UL (ref 1.1–3.7)
ABSOLUTE MONO #: 1 K/UL (ref 0.1–1.2)
ANION GAP SERPL CALCULATED.3IONS-SCNC: 11 MMOL/L (ref 9–17)
BASOPHILS # BLD: 0 % (ref 0–2)
BASOPHILS ABSOLUTE: <0.03 K/UL (ref 0–0.2)
BUN BLDV-MCNC: 37 MG/DL (ref 8–23)
BUN/CREAT BLD: 24 (ref 9–20)
CALCIUM SERPL-MCNC: 7.6 MG/DL (ref 8.6–10.4)
CHLORIDE BLD-SCNC: 100 MMOL/L (ref 98–107)
CO2: 29 MMOL/L (ref 20–31)
CREAT SERPL-MCNC: 1.54 MG/DL (ref 0.5–0.9)
DIFFERENTIAL TYPE: ABNORMAL
EOSINOPHILS RELATIVE PERCENT: 2 % (ref 1–4)
GFR AFRICAN AMERICAN: 40 ML/MIN
GFR NON-AFRICAN AMERICAN: 33 ML/MIN
GFR SERPL CREATININE-BSD FRML MDRD: ABNORMAL ML/MIN/{1.73_M2}
GFR SERPL CREATININE-BSD FRML MDRD: ABNORMAL ML/MIN/{1.73_M2}
GLUCOSE BLD-MCNC: 111 MG/DL (ref 70–99)
GLUCOSE BLD-MCNC: 120 MG/DL (ref 65–105)
GLUCOSE BLD-MCNC: 132 MG/DL (ref 65–105)
GLUCOSE BLD-MCNC: 148 MG/DL (ref 65–105)
GLUCOSE BLD-MCNC: 157 MG/DL (ref 65–105)
HCT VFR BLD CALC: 22.7 % (ref 36.3–47.1)
HCT VFR BLD CALC: 29.5 % (ref 36.3–47.1)
HEMOGLOBIN: 6.8 G/DL (ref 11.9–15.1)
HEMOGLOBIN: 9 G/DL (ref 11.9–15.1)
IMMATURE GRANULOCYTES: 2 %
LYMPHOCYTES # BLD: 15 % (ref 24–43)
MAGNESIUM: 1.3 MG/DL (ref 1.6–2.6)
MCH RBC QN AUTO: 30.5 PG (ref 25.2–33.5)
MCHC RBC AUTO-ENTMCNC: 30 G/DL (ref 28.4–34.8)
MCV RBC AUTO: 101.8 FL (ref 82.6–102.9)
MONOCYTES # BLD: 13 % (ref 3–12)
NRBC AUTOMATED: 0 PER 100 WBC
PDW BLD-RTO: 16.7 % (ref 11.8–14.4)
PHOSPHORUS: 4.5 MG/DL (ref 2.6–4.5)
PLATELET # BLD: 118 K/UL (ref 138–453)
PLATELET ESTIMATE: ABNORMAL
PMV BLD AUTO: 10.5 FL (ref 8.1–13.5)
POTASSIUM SERPL-SCNC: 4.6 MMOL/L (ref 3.7–5.3)
RBC # BLD: 2.23 M/UL (ref 3.95–5.11)
RBC # BLD: ABNORMAL 10*6/UL
SEG NEUTROPHILS: 69 % (ref 36–65)
SEGMENTED NEUTROPHILS ABSOLUTE COUNT: 5.5 K/UL (ref 1.5–8.1)
SODIUM BLD-SCNC: 140 MMOL/L (ref 135–144)
WBC # BLD: 8 K/UL (ref 3.5–11.3)
WBC # BLD: ABNORMAL 10*3/UL

## 2019-09-05 PROCEDURE — 6360000002 HC RX W HCPCS: Performed by: INTERNAL MEDICINE

## 2019-09-05 PROCEDURE — 80048 BASIC METABOLIC PNL TOTAL CA: CPT

## 2019-09-05 PROCEDURE — 86920 COMPATIBILITY TEST SPIN: CPT

## 2019-09-05 PROCEDURE — 36430 TRANSFUSION BLD/BLD COMPNT: CPT

## 2019-09-05 PROCEDURE — 6370000000 HC RX 637 (ALT 250 FOR IP): Performed by: INTERNAL MEDICINE

## 2019-09-05 PROCEDURE — 97530 THERAPEUTIC ACTIVITIES: CPT

## 2019-09-05 PROCEDURE — 94760 N-INVAS EAR/PLS OXIMETRY 1: CPT

## 2019-09-05 PROCEDURE — 2580000003 HC RX 258: Performed by: INTERNAL MEDICINE

## 2019-09-05 PROCEDURE — 6360000002 HC RX W HCPCS: Performed by: SURGERY

## 2019-09-05 PROCEDURE — 85025 COMPLETE CBC W/AUTO DIFF WBC: CPT

## 2019-09-05 PROCEDURE — 84100 ASSAY OF PHOSPHORUS: CPT

## 2019-09-05 PROCEDURE — 94640 AIRWAY INHALATION TREATMENT: CPT

## 2019-09-05 PROCEDURE — 97535 SELF CARE MNGMENT TRAINING: CPT

## 2019-09-05 PROCEDURE — 86901 BLOOD TYPING SEROLOGIC RH(D): CPT

## 2019-09-05 PROCEDURE — 92523 SPEECH SOUND LANG COMPREHEN: CPT

## 2019-09-05 PROCEDURE — 86850 RBC ANTIBODY SCREEN: CPT

## 2019-09-05 PROCEDURE — 85014 HEMATOCRIT: CPT

## 2019-09-05 PROCEDURE — 36415 COLL VENOUS BLD VENIPUNCTURE: CPT

## 2019-09-05 PROCEDURE — 2700000000 HC OXYGEN THERAPY PER DAY

## 2019-09-05 PROCEDURE — 83735 ASSAY OF MAGNESIUM: CPT

## 2019-09-05 PROCEDURE — 85018 HEMOGLOBIN: CPT

## 2019-09-05 PROCEDURE — 82947 ASSAY GLUCOSE BLOOD QUANT: CPT

## 2019-09-05 PROCEDURE — 2060000000 HC ICU INTERMEDIATE R&B

## 2019-09-05 PROCEDURE — P9016 RBC LEUKOCYTES REDUCED: HCPCS

## 2019-09-05 PROCEDURE — 86900 BLOOD TYPING SEROLOGIC ABO: CPT

## 2019-09-05 RX ORDER — LEVOFLOXACIN 500 MG/1
250 TABLET, FILM COATED ORAL DAILY
Status: DISCONTINUED | OUTPATIENT
Start: 2019-09-06 | End: 2019-09-06

## 2019-09-05 RX ORDER — 0.9 % SODIUM CHLORIDE 0.9 %
250 INTRAVENOUS SOLUTION INTRAVENOUS ONCE
Status: DISCONTINUED | OUTPATIENT
Start: 2019-09-05 | End: 2019-09-11 | Stop reason: HOSPADM

## 2019-09-05 RX ORDER — HEPARIN SODIUM 10000 [USP'U]/100ML
18 INJECTION, SOLUTION INTRAVENOUS CONTINUOUS
Status: DISCONTINUED | OUTPATIENT
Start: 2019-09-05 | End: 2019-09-10

## 2019-09-05 RX ADMIN — HYDROCODONE BITARTRATE AND ACETAMINOPHEN 1 TABLET: 5; 325 TABLET ORAL at 21:12

## 2019-09-05 RX ADMIN — CARBIDOPA AND LEVODOPA 1 TABLET: 25; 100 TABLET, EXTENDED RELEASE ORAL at 21:14

## 2019-09-05 RX ADMIN — ROPINIROLE HYDROCHLORIDE 2 MG: 2 TABLET, FILM COATED ORAL at 21:14

## 2019-09-05 RX ADMIN — METOPROLOL TARTRATE 25 MG: 25 TABLET, FILM COATED ORAL at 21:14

## 2019-09-05 RX ADMIN — HYDROCODONE BITARTRATE AND ACETAMINOPHEN 1 TABLET: 5; 325 TABLET ORAL at 14:59

## 2019-09-05 RX ADMIN — CARBIDOPA AND LEVODOPA 1 TABLET: 25; 100 TABLET, EXTENDED RELEASE ORAL at 17:15

## 2019-09-05 RX ADMIN — HEPARIN SODIUM 5000 UNITS: 5000 INJECTION INTRAVENOUS; SUBCUTANEOUS at 06:05

## 2019-09-05 RX ADMIN — RASAGILINE 1 MG: 1 TABLET ORAL at 12:35

## 2019-09-05 RX ADMIN — ROPINIROLE HYDROCHLORIDE 2 MG: 2 TABLET, FILM COATED ORAL at 17:15

## 2019-09-05 RX ADMIN — CYCLOBENZAPRINE HYDROCHLORIDE 5 MG: 5 TABLET, FILM COATED ORAL at 23:41

## 2019-09-05 RX ADMIN — ROPINIROLE HYDROCHLORIDE 2 MG: 2 TABLET, FILM COATED ORAL at 12:33

## 2019-09-05 RX ADMIN — HEPARIN SODIUM 5000 UNITS: 5000 INJECTION INTRAVENOUS; SUBCUTANEOUS at 14:59

## 2019-09-05 RX ADMIN — INSULIN LISPRO 1 UNITS: 100 INJECTION, SOLUTION INTRAVENOUS; SUBCUTANEOUS at 18:27

## 2019-09-05 RX ADMIN — IPRATROPIUM BROMIDE AND ALBUTEROL SULFATE 3 ML: .5; 3 SOLUTION RESPIRATORY (INHALATION) at 15:07

## 2019-09-05 RX ADMIN — ASPIRIN 81 MG: 81 TABLET, COATED ORAL at 12:34

## 2019-09-05 RX ADMIN — Medication 5 MG: at 23:40

## 2019-09-05 RX ADMIN — PANTOPRAZOLE SODIUM 40 MG: 40 TABLET, DELAYED RELEASE ORAL at 06:05

## 2019-09-05 RX ADMIN — HEPARIN SODIUM AND DEXTROSE 18 UNITS/KG/HR: 10000; 5 INJECTION INTRAVENOUS at 21:22

## 2019-09-05 RX ADMIN — POLYETHYLENE GLYCOL 3350 17 G: 17 POWDER, FOR SOLUTION ORAL at 14:59

## 2019-09-05 RX ADMIN — ANTI-FUNGAL POWDER MICONAZOLE NITRATE TALC FREE: 1.42 POWDER TOPICAL at 12:37

## 2019-09-05 RX ADMIN — SENNOSIDES 17.2 MG: 8.6 TABLET, FILM COATED ORAL at 21:12

## 2019-09-05 RX ADMIN — METOPROLOL TARTRATE 25 MG: 25 TABLET, FILM COATED ORAL at 12:34

## 2019-09-05 RX ADMIN — LINAGLIPTIN 2.5 MG: 5 TABLET, FILM COATED ORAL at 12:34

## 2019-09-05 RX ADMIN — ANTI-FUNGAL POWDER MICONAZOLE NITRATE TALC FREE: 1.42 POWDER TOPICAL at 20:58

## 2019-09-05 RX ADMIN — IPRATROPIUM BROMIDE AND ALBUTEROL SULFATE 3 ML: .5; 3 SOLUTION RESPIRATORY (INHALATION) at 19:39

## 2019-09-05 RX ADMIN — PANTOPRAZOLE SODIUM 40 MG: 40 TABLET, DELAYED RELEASE ORAL at 17:18

## 2019-09-05 RX ADMIN — IPRATROPIUM BROMIDE AND ALBUTEROL SULFATE 3 ML: .5; 3 SOLUTION RESPIRATORY (INHALATION) at 07:56

## 2019-09-05 RX ADMIN — AMIODARONE HYDROCHLORIDE 200 MG: 200 TABLET ORAL at 12:33

## 2019-09-05 RX ADMIN — CYCLOBENZAPRINE HYDROCHLORIDE 5 MG: 5 TABLET, FILM COATED ORAL at 06:04

## 2019-09-05 RX ADMIN — CARBIDOPA AND LEVODOPA 1 TABLET: 25; 100 TABLET, EXTENDED RELEASE ORAL at 12:34

## 2019-09-05 RX ADMIN — INSULIN LISPRO 1 UNITS: 100 INJECTION, SOLUTION INTRAVENOUS; SUBCUTANEOUS at 12:38

## 2019-09-05 RX ADMIN — SODIUM CHLORIDE, PRESERVATIVE FREE 10 ML: 5 INJECTION INTRAVENOUS at 21:15

## 2019-09-05 RX ADMIN — HYDROCODONE BITARTRATE AND ACETAMINOPHEN 1 TABLET: 5; 325 TABLET ORAL at 08:19

## 2019-09-05 RX ADMIN — CALCITRIOL 0.25 MCG: 0.25 CAPSULE ORAL at 12:34

## 2019-09-05 RX ADMIN — SODIUM CHLORIDE, PRESERVATIVE FREE 10 ML: 5 INJECTION INTRAVENOUS at 09:15

## 2019-09-05 RX ADMIN — ATORVASTATIN CALCIUM 20 MG: 20 TABLET, FILM COATED ORAL at 21:14

## 2019-09-05 ASSESSMENT — PAIN DESCRIPTION - LOCATION: LOCATION: GROIN

## 2019-09-05 ASSESSMENT — PAIN DESCRIPTION - DESCRIPTORS: DESCRIPTORS: SPASM

## 2019-09-05 ASSESSMENT — PAIN SCALES - GENERAL
PAINLEVEL_OUTOF10: 0
PAINLEVEL_OUTOF10: 0
PAINLEVEL_OUTOF10: 8
PAINLEVEL_OUTOF10: 0
PAINLEVEL_OUTOF10: 10
PAINLEVEL_OUTOF10: 4

## 2019-09-05 ASSESSMENT — PAIN DESCRIPTION - PROGRESSION
CLINICAL_PROGRESSION: NOT CHANGED

## 2019-09-05 ASSESSMENT — PAIN DESCRIPTION - ORIENTATION: ORIENTATION: RIGHT

## 2019-09-05 NOTE — FLOWSHEET NOTE
Patient is sleeping. Writer leaves note of support on tray table and offers a silent prayer. Patient does not respond. 09/04/19 2017   Encounter Summary   Services provided to: Patient not available   Referral/Consult From: [facility]   Support System Spouse; Children   Continue Visiting   (9/4/19 Pt.  Sleeping)   Complexity of Encounter Low   Length of Encounter 15 minutes   Routine   Type Follow up   Assessment Sleeping   Intervention Prayer   Outcome Did not respond

## 2019-09-05 NOTE — PROGRESS NOTES
Pulmonary Critical Care Progress Note  Elie Essex, M.D. Patient seen for the follow up of chest pain, respiratory failure, CHF COPD    Subjective:  She is feeling fairly well at this time, no obvious complaints. She denies shortness of breath, occasional cough, no chest pain. No acute events noted overnight. She is sitting up in the bedside chair. She did not use BiPAP last night. Length of stay: 9 Days    Examination:  Vitals: BP (!) 117/52   Pulse 89   Temp 98.3 °F (36.8 °C) (Temporal)   Resp 16   Ht 5' (1.524 m)   Wt 189 lb 3.2 oz (85.8 kg)   LMP 04/03/2004 (Within Years)   SpO2 98%   BMI 36.95 kg/m²     General appearance: alert and cooperative with exam, no distress  Neck: No JVD  Lungs: Moderate air exchange, no rales, no wheeze, no rhonchi  Heart: regular rate and rhythm, S1, S2 normal, no gallop  Abdomen: Soft, non tender, + BS  Extremities: no cyanosis or clubbing. No significant edema    LABs:  CBC:   Recent Labs     09/04/19  0520 09/05/19  0334   WBC 10.1 8.0   HGB 7.2* 6.8*   HCT 23.8* 22.7*   * 118*     BMP:   Recent Labs     09/04/19  0520 09/05/19  0334    140   K 4.9 4.6   CO2 27 29   BUN 39* 37*   CREATININE 1.58* 1.54*   LABGLOM 32* 33*   GLUCOSE 118* 111*     Impression:  · Chest pain/abnormal stress test s/p cardiac cath 8/30/19 with non-obstructive CAD   · Chronic respiratory failure  · Chronic diastolic heart failure  · Atelectasis  · COPD/30-pack-year smoking history  · KRISTIN on CKD  · A.  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    Recommendations:  · 2 liters/min via nasal cannula  · Patient encouraged to use BiPAP for sleep and as needed  · Albuterol and Ipratropium Q 8 hours and prn  · Dul[address] 200  · Nephrology, Vascular on consult  · Obtain coags  · Hematology on consult, considering bone marrow biopsy as outpatient   · DVT prophylaxis with low molecular weight

## 2019-09-05 NOTE — FLOWSHEET NOTE
09/05/19 1020   Provider Notification   Reason for Communication Review case   Provider Name Dr. Mendoza Channel   Provider Notification Physician   Method of Communication Face to face   Response At bedside   Notification Time 1020   MD rounded, updated on pt's status. Ok to d/c to SNF. Will continue to monitor.

## 2019-09-05 NOTE — PROGRESS NOTES
Speech Language Pathology  Facility/Department: [facility] CVICU  Initial Speech/Language/Cognitive Assessment    NAME: Milton Sanz  : 1945   MRN: [id number]  ADMISSION DATE: 2019  ADMITTING DIAGNOSIS: has Controlled type 2 diabetes mellitus with stage 3 chronic kidney disease, without long-term current use of insulin ([address] Hyperlipidemia; Essential hypertension; Chronic leg pain; Paroxysmal atrial fibrillation ([address] Asthma; Gastroesophageal reflux disease without esophagitis; ECTOR on CPAP; Type 2 diabetes mellitus with complication, without long-term current use of insulin ([address] Spinal stenosis in cervical region; Hypomagnesemia; Hyperphosphaturia; Hypocalcemia; Parkinson's disease ([address] Acute renal failure ([address] Acute cystitis with hematuria; Altered mental status; Iron deficiency anemia due to chronic blood loss; Morbid obesity with BMI of 40.0-44.9, adult ([address] Hyperplastic polyp of intestine; Diverticulosis; Panlobular emphysema ([address] Rapid atrial fibrillation (HCC); CKD (chronic kidney disease) stage 3, GFR 30-59 ml/min ([address] KRISTIN (acute kidney injury) ([address] Anemia in chronic kidney disease; Chronic respiratory failure with hypoxia and hypercapnia ([address] Acute kidney injury superimposed on chronic kidney disease ([address] CKD stage 4 due to type 2 diabetes mellitus ([address] E. coli UTI (urinary tract infection); Nephrolithiasis; Candidal intertrigo; Venous insufficiency; Malabsorption; Anemia of chronic renal failure, stage 4 (severe) ([address] Iron deficiency; Coronary atherosclerosis; COPD exacerbation ([address] Restless leg syndrome; SIRS (systemic inflammatory response syndrome) ([address] Nausea and vomiting in adult; Bacterial UTI; Odynophagia; Esophageal dysphagia; Candida esophagitis ([address] Sepsis due to urinary tract infection ([address] Chronic respiratory failure with hypoxia ([address] Chronic diastolic congestive heart failure ([address]  History of ESBL E. coli infection; Stage 4 chronic kidney disease intelligibility utlizing LSVT LOUD program upon discharge at [facility]. Patient/family involved in developing goals and treatment plan: yes    Subjective:   Previous level of function and limitations: independent  General  Chart Reviewed: Yes  Social/Functional History  Lives With: Spouse  Vision  Vision: Impaired  Hearing  Hearing: Exceptions to [facility]           Objective:     Oral/Motor  Oral Motor:  Within functional limits      Motor Speech  Motor Speech: Exceptions to WFL(mildly decreased intelligibility due to decreased speech intensity and mild hypernasality)  Intelligibility: Mild    Prognosis:  Speech Therapy Prognosis  Prognosis: Fair  Individuals consulted  Consulted and agree with results and recommendations: Patient    Education:  Patient Education: yes  Patient Education Response: Verbalizes understanding        Therapy Time:   Individual Concurrent Group Co-treatment   Time In 1130         Time Out 1155         Minutes 25                 Andrzej Aguilar SLP  9/5/2019 12:54 PM

## 2019-09-05 NOTE — PROGRESS NOTES
Limits  Objective    ADL  Feeding: Independent(pt agreeable to sit in a bedside chair to eat breakfast)  Grooming: Stand by assistance(for oral care while seated)  LE Dressing: Moderate assistance;Maximum assistance(pt unable to gaby or doff socks, assist with brief mgmt)  Toileting: Moderate assistance;Maximum assistance(w/ use of bedside commode)        Balance  Sitting Balance: Stand by assistance  Standing Balance: Minimal assistance(x2)  Functional Mobility  Functional - Mobility Device: Rolling Walker  Activity: Other(bed to chair)  Assist Level: Minimal assistance(x2)  Functional Mobility Comments: Pt completed functional mob in the room from the bed to a bedside chair. Pt dependent on the RW and required verb cues for upright posture with limited follow through. Pt educated on walker safety and remaining inside the walker for fall prevention. Bed mobility  Rolling to Left: Moderate assistance;2 Person assistance  Supine to Sit: Moderate assistance;2 Person assistance  Sit to Supine: Unable to assess(pt in chair at end of session)  Scooting: Maximal assistance;2 Person assistance(to sit EOB. Lateral seated weightshifts by pt x4 reps required to assist with scooting so feet were flat on the floor)  Comment: Pt requiring max verb cues t/o bed mob to get sitting EOB and instruction on log rolling tech. Verb and tactile cues for use of bedrails, pt reaching for PT to assist with supine to sit. Pt demonstrated fair carryover of cues/ instructions. Transfers  Sit to stand: Minimal assistance(x2)  Stand to sit: Minimal assistance(x2)  Transfer Comments: Pt requiring assist for controlled stand to sit d/t poor eccentric control and required multiple attempts for sit to stand from elevated bed d/t limited use of R arm. Pt educated on proper hand placement during ADL transfers.                        Cognition  Overall Cognitive Status: Exceptions  Arousal/Alertness: Appropriate responses to stimuli  Following

## 2019-09-05 NOTE — PROGRESS NOTES
mellitus with stage 3 chronic kidney disease, without long-term current use of insulin (HCC)    Hyperlipidemia    Essential hypertension    Chronic leg pain    Paroxysmal atrial fibrillation (HCC)    Asthma    Gastroesophageal reflux disease without esophagitis    ECTOR on CPAP    Type 2 diabetes mellitus with complication, without long-term current use of insulin (HCC)    Spinal stenosis in cervical region    Hypomagnesemia    Hyperphosphaturia    Hypocalcemia    Parkinson's disease ([address]    Acute renal failure (HCC)    Acute cystitis with hematuria    Altered mental status    Iron deficiency anemia due to chronic blood loss    Morbid obesity with BMI of 40.0-44.9, adult (HCC)    Hyperplastic polyp of intestine    Diverticulosis    Panlobular emphysema (HCC)    Rapid atrial fibrillation (HCC)    CKD (chronic kidney disease) stage 3, GFR 30-59 ml/min ([facility])    KRISTIN (acute kidney injury) ([address]    Anemia in chronic kidney disease    Chronic respiratory failure with hypoxia and hypercapnia (HCC)    Acute kidney injury superimposed on chronic kidney disease ([address]    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 coli UTI (urinary tract infection)    Nephrolithiasis    Candidal intertrigo    Venous insufficiency    Malabsorption    Anemia of chronic renal failure, stage 4 (severe) ([facility])    Iron deficiency    Coronary atherosclerosis    COPD exacerbation (HCC)    Restless leg syndrome    SIRS (systemic inflammatory response syndrome) (HCC)    Nausea and vomiting in adult    Bacterial UTI    Odynophagia    Esophageal dysphagia    Candida esophagitis (HCC)    Sepsis due to urinary tract infection (HCC)    Chronic respiratory failure with hypoxia (HCC)    Chronic diastolic congestive heart failure (HCC)    History of ESBL E. coli infection    Stage 4 chronic kidney disease (HCC)    Fever    Macrocytic anemia    Hypercalcemia    Monoclonal gammopathy of undetermined

## 2019-09-05 NOTE — PROGRESS NOTES
(H) 09/05/2019    BUN 39 (H) 09/04/2019    BUN 45 (H) 09/03/2019    CREATININE 1.54 (H) 09/05/2019    CREATININE 1.58 (H) 09/04/2019    CREATININE 1.93 (H) 09/03/2019    GLUCOSE 111 (H) 09/05/2019    GLUCOSE 118 (H) 09/04/2019    GLUCOSE 133 (H) 09/03/2019     CMP:   Lab Results   Component Value Date     09/05/2019    K 4.6 09/05/2019     09/05/2019    CO2 29 09/05/2019    BUN 37 09/05/2019    CREATININE 1.54 09/05/2019    GLUCOSE 111 09/05/2019    GLUCOSE 132 03/07/2012    CALCIUM 7.6 09/05/2019    PROT 6.3 06/05/2019    LABALBU 3.2 06/05/2019    LABALBU Detected 11/30/2018    BILITOT 0.16 09/01/2019    ALKPHOS 67 06/05/2019    AST 17 06/05/2019    ALT <5 06/05/2019      Hepatic:   Lab Results   Component Value Date    AST 17 06/05/2019    AST 17 06/04/2019    AST 14 11/28/2018    ALT <5 (L) 06/05/2019    ALT <5 (L) 06/04/2019    ALT <5 (L) 11/28/2018    BILITOT 0.16 (L) 09/01/2019    BILITOT 0.43 06/05/2019    BILITOT 0.52 06/04/2019    ALKPHOS 67 06/05/2019    ALKPHOS 75 06/04/2019    ALKPHOS 42 11/28/2018     BNP:   Lab Results   Component Value Date    BNP 40 06/07/2013     (H) 05/31/2013     Lipids:   Lab Results   Component Value Date    CHOL 135 08/26/2017    HDL 30 (L) 08/26/2017     INR:   Lab Results   Component Value Date    INR 0.9 09/01/2019    INR 1.0 08/30/2019    INR 1.0 08/25/2019     PTH: No results found for: PTH  Phosphorus:    Lab Results   Component Value Date    PHOS 4.5 09/05/2019     Ionized Calcium: No results found for: IONCA  Magnesium:   Lab Results   Component Value Date    MG 1.3 09/05/2019     Albumin:   Lab Results   Component Value Date    LABALBU 3.2 06/05/2019    LABALBU Detected 11/30/2018     Last 3 CK, CKMB, Troponin: @LABRCNT(CKTOTAL:3,CKMB:3,TROPONINI:3)       URINE:)No results found for: Maria Alejandra Floyd    Radiology:   Reviewed. Assessment:  1. Acute Kidney Injury-multifactorial       A. Bactrim       B. Vasomotor  2. CKD stage 3B/4  3. Hyperkalemia  4.

## 2019-09-06 LAB
ABSOLUTE EOS #: 0.17 K/UL (ref 0–0.44)
ABSOLUTE IMMATURE GRANULOCYTE: 0.17 K/UL (ref 0–0.3)
ABSOLUTE LYMPH #: 1.31 K/UL (ref 1.1–3.7)
ABSOLUTE MONO #: 1.22 K/UL (ref 0.1–1.2)
ABSOLUTE RETIC #: 0.1 M/UL (ref 0.03–0.08)
ANION GAP SERPL CALCULATED.3IONS-SCNC: 11 MMOL/L (ref 9–17)
BASOPHILS # BLD: 0 % (ref 0–2)
BASOPHILS ABSOLUTE: 0.03 K/UL (ref 0–0.2)
BUN BLDV-MCNC: 36 MG/DL (ref 8–23)
BUN/CREAT BLD: 24 (ref 9–20)
CALCIUM SERPL-MCNC: 7.6 MG/DL (ref 8.6–10.4)
CHLORIDE BLD-SCNC: 98 MMOL/L (ref 98–107)
CO2: 28 MMOL/L (ref 20–31)
CREAT SERPL-MCNC: 1.5 MG/DL (ref 0.5–0.9)
CULTURE: ABNORMAL
DATE, STOOL #1: NORMAL
DATE, STOOL #2: NORMAL
DATE, STOOL #3: NORMAL
DIFFERENTIAL TYPE: ABNORMAL
EOSINOPHILS RELATIVE PERCENT: 2 % (ref 1–4)
GFR AFRICAN AMERICAN: 41 ML/MIN
GFR NON-AFRICAN AMERICAN: 34 ML/MIN
GFR SERPL CREATININE-BSD FRML MDRD: ABNORMAL ML/MIN/{1.73_M2}
GFR SERPL CREATININE-BSD FRML MDRD: ABNORMAL ML/MIN/{1.73_M2}
GLUCOSE BLD-MCNC: 100 MG/DL (ref 65–105)
GLUCOSE BLD-MCNC: 112 MG/DL (ref 70–99)
GLUCOSE BLD-MCNC: 118 MG/DL (ref 65–105)
GLUCOSE BLD-MCNC: 124 MG/DL (ref 65–105)
GLUCOSE BLD-MCNC: 159 MG/DL (ref 65–105)
HAPTOGLOBIN: 328 MG/DL (ref 30–200)
HCT VFR BLD CALC: 24 % (ref 36.3–47.1)
HEMOCCULT SP1 STL QL: NEGATIVE
HEMOCCULT SP2 STL QL: NORMAL
HEMOCCULT SP3 STL QL: NORMAL
HEMOGLOBIN: 7.5 G/DL (ref 11.9–15.1)
IMMATURE GRANULOCYTES: 2 %
IMMATURE RETIC FRACT: 25.7 % (ref 2.7–18.3)
LACTATE DEHYDROGENASE: 293 U/L (ref 135–214)
LYMPHOCYTES # BLD: 15 % (ref 24–43)
Lab: ABNORMAL
MAGNESIUM: 1.3 MG/DL (ref 1.6–2.6)
MCH RBC QN AUTO: 30.9 PG (ref 25.2–33.5)
MCHC RBC AUTO-ENTMCNC: 31.3 G/DL (ref 28.4–34.8)
MCV RBC AUTO: 98.8 FL (ref 82.6–102.9)
MONOCYTES # BLD: 14 % (ref 3–12)
NRBC AUTOMATED: 0 PER 100 WBC
PARTIAL THROMBOPLASTIN TIME: 55.6 SEC (ref 23–31)
PARTIAL THROMBOPLASTIN TIME: 65.4 SEC (ref 23–31)
PARTIAL THROMBOPLASTIN TIME: 67.5 SEC (ref 23–31)
PDW BLD-RTO: 16.4 % (ref 11.8–14.4)
PHOSPHORUS: 4.5 MG/DL (ref 2.6–4.5)
PLATELET # BLD: 130 K/UL (ref 138–453)
PLATELET ESTIMATE: ABNORMAL
PMV BLD AUTO: 10.8 FL (ref 8.1–13.5)
POTASSIUM SERPL-SCNC: 4.6 MMOL/L (ref 3.7–5.3)
RBC # BLD: 2.43 M/UL (ref 3.95–5.11)
RBC # BLD: ABNORMAL 10*6/UL
RETIC %: 3.9 % (ref 0.5–1.9)
RETIC HEMOGLOBIN: 20.2 PG (ref 28.2–35.7)
SEG NEUTROPHILS: 67 % (ref 36–65)
SEGMENTED NEUTROPHILS ABSOLUTE COUNT: 5.97 K/UL (ref 1.5–8.1)
SODIUM BLD-SCNC: 137 MMOL/L (ref 135–144)
SPECIMEN DESCRIPTION: ABNORMAL
TIME, STOOL #1: 1540
TIME, STOOL #2: NORMAL
TIME, STOOL #3: NORMAL
WBC # BLD: 8.9 K/UL (ref 3.5–11.3)
WBC # BLD: ABNORMAL 10*3/UL

## 2019-09-06 PROCEDURE — 6370000000 HC RX 637 (ALT 250 FOR IP): Performed by: INTERNAL MEDICINE

## 2019-09-06 PROCEDURE — 84100 ASSAY OF PHOSPHORUS: CPT

## 2019-09-06 PROCEDURE — 6360000002 HC RX W HCPCS: Performed by: NURSE PRACTITIONER

## 2019-09-06 PROCEDURE — 85730 THROMBOPLASTIN TIME PARTIAL: CPT

## 2019-09-06 PROCEDURE — 85025 COMPLETE CBC W/AUTO DIFF WBC: CPT

## 2019-09-06 PROCEDURE — 83735 ASSAY OF MAGNESIUM: CPT

## 2019-09-06 PROCEDURE — 93971 EXTREMITY STUDY: CPT

## 2019-09-06 PROCEDURE — 83010 ASSAY OF HAPTOGLOBIN QUANT: CPT

## 2019-09-06 PROCEDURE — 85045 AUTOMATED RETICULOCYTE COUNT: CPT

## 2019-09-06 PROCEDURE — 99232 SBSQ HOSP IP/OBS MODERATE 35: CPT | Performed by: INTERNAL MEDICINE

## 2019-09-06 PROCEDURE — 2700000000 HC OXYGEN THERAPY PER DAY

## 2019-09-06 PROCEDURE — 6360000002 HC RX W HCPCS: Performed by: SURGERY

## 2019-09-06 PROCEDURE — 36415 COLL VENOUS BLD VENIPUNCTURE: CPT

## 2019-09-06 PROCEDURE — 82947 ASSAY GLUCOSE BLOOD QUANT: CPT

## 2019-09-06 PROCEDURE — 94640 AIRWAY INHALATION TREATMENT: CPT

## 2019-09-06 PROCEDURE — 83615 LACTATE (LD) (LDH) ENZYME: CPT

## 2019-09-06 PROCEDURE — 82272 OCCULT BLD FECES 1-3 TESTS: CPT

## 2019-09-06 PROCEDURE — 80048 BASIC METABOLIC PNL TOTAL CA: CPT

## 2019-09-06 PROCEDURE — 2060000000 HC ICU INTERMEDIATE R&B

## 2019-09-06 PROCEDURE — 99254 IP/OBS CNSLTJ NEW/EST MOD 60: CPT | Performed by: INTERNAL MEDICINE

## 2019-09-06 PROCEDURE — 93926 LOWER EXTREMITY STUDY: CPT

## 2019-09-06 RX ORDER — CEPHALEXIN 500 MG/1
500 CAPSULE ORAL EVERY 12 HOURS SCHEDULED
Status: DISCONTINUED | OUTPATIENT
Start: 2019-09-06 | End: 2019-09-11 | Stop reason: HOSPADM

## 2019-09-06 RX ORDER — BISACODYL 10 MG
10 SUPPOSITORY, RECTAL RECTAL DAILY PRN
Status: DISCONTINUED | OUTPATIENT
Start: 2019-09-06 | End: 2019-09-11 | Stop reason: HOSPADM

## 2019-09-06 RX ADMIN — ANTI-FUNGAL POWDER MICONAZOLE NITRATE TALC FREE: 1.42 POWDER TOPICAL at 20:53

## 2019-09-06 RX ADMIN — ATORVASTATIN CALCIUM 20 MG: 20 TABLET, FILM COATED ORAL at 20:13

## 2019-09-06 RX ADMIN — IPRATROPIUM BROMIDE AND ALBUTEROL SULFATE 3 ML: .5; 3 SOLUTION RESPIRATORY (INHALATION) at 14:07

## 2019-09-06 RX ADMIN — Medication 1 MG: at 22:53

## 2019-09-06 RX ADMIN — HYDROCODONE BITARTRATE AND ACETAMINOPHEN 1 TABLET: 5; 325 TABLET ORAL at 20:14

## 2019-09-06 RX ADMIN — ROPINIROLE HYDROCHLORIDE 2 MG: 2 TABLET, FILM COATED ORAL at 20:14

## 2019-09-06 RX ADMIN — BISACODYL 10 MG: 10 SUPPOSITORY RECTAL at 14:37

## 2019-09-06 RX ADMIN — CYCLOBENZAPRINE HYDROCHLORIDE 5 MG: 5 TABLET, FILM COATED ORAL at 20:50

## 2019-09-06 RX ADMIN — IPRATROPIUM BROMIDE AND ALBUTEROL SULFATE 3 ML: .5; 3 SOLUTION RESPIRATORY (INHALATION) at 09:32

## 2019-09-06 RX ADMIN — Medication 1 MG: at 05:51

## 2019-09-06 RX ADMIN — HEPARIN SODIUM AND DEXTROSE 20 UNITS/KG/HR: 10000; 5 INJECTION INTRAVENOUS at 12:18

## 2019-09-06 RX ADMIN — ANTI-FUNGAL POWDER MICONAZOLE NITRATE TALC FREE: 1.42 POWDER TOPICAL at 09:30

## 2019-09-06 RX ADMIN — CARBIDOPA AND LEVODOPA 1 TABLET: 25; 100 TABLET, EXTENDED RELEASE ORAL at 20:13

## 2019-09-06 RX ADMIN — Medication 1 MG: at 14:17

## 2019-09-06 RX ADMIN — METOPROLOL TARTRATE 25 MG: 25 TABLET, FILM COATED ORAL at 20:13

## 2019-09-06 RX ADMIN — INSULIN LISPRO 1 UNITS: 100 INJECTION, SOLUTION INTRAVENOUS; SUBCUTANEOUS at 20:12

## 2019-09-06 RX ADMIN — SENNOSIDES 17.2 MG: 8.6 TABLET, FILM COATED ORAL at 20:13

## 2019-09-06 ASSESSMENT — PAIN DESCRIPTION - ORIENTATION: ORIENTATION: RIGHT

## 2019-09-06 ASSESSMENT — PAIN DESCRIPTION - LOCATION: LOCATION: LEG

## 2019-09-06 ASSESSMENT — PAIN SCALES - GENERAL
PAINLEVEL_OUTOF10: 0
PAINLEVEL_OUTOF10: 8
PAINLEVEL_OUTOF10: 5
PAINLEVEL_OUTOF10: 0
PAINLEVEL_OUTOF10: 0
PAINLEVEL_OUTOF10: 10
PAINLEVEL_OUTOF10: 6
PAINLEVEL_OUTOF10: 4
PAINLEVEL_OUTOF10: 4
PAINLEVEL_OUTOF10: 6

## 2019-09-06 NOTE — PROGRESS NOTES
Subjective:    Diabetes  No polyuria no polydipsia no hypoglycemia blood sugars  ROS  No chills no GI  complaints no cardiac complaints still shortness of breath with exertion no TIA no bleeding no headache no sore throat skin lesions except swelling of the right leg  physical exam  General Appearance: alert and oriented to person, place and time and in no acute distress  Skin: warm and dry, no rash or erythema  Head: normocephalic and atraumatic  Eyes: pupils equal, round, and reactive to light and sclera anicteric    Neck: neck supple and non tender without mass, no thyromegaly or thyroid nodules, no cervical lymphadenopathy   Pulmonary/Chest: clear to auscultation bilaterally- no wheezes, rales or rhonchi, normal air movement, no respiratory distress  Cardiovascular: normal rate, regular rhythm, normal S1 and S2, no gallops, intact distal pulses and no carotid bruits  Abdomen: soft, non-tender, non-distended, normal bowel sounds, no masses or organomegaly  Extremities: Right leg edema and pulses no Homans sign    Neurologic: Alert oriented x3 with no focal    /84   Pulse 73   Temp 97.4 °F (36.3 °C) (Temporal)   Resp 16   Ht 5' (1.524 m)   Wt 189 lb 3.2 oz (85.8 kg)   LMP 04/03/2004 (Within Years)   SpO2 97%   BMI 36.95 kg/m²     CBC:   Lab Results   Component Value Date    WBC 8.9 09/06/2019    RBC 2.43 09/06/2019    HGB 7.5 09/06/2019    HCT 24.0 09/06/2019    MCV 98.8 09/06/2019    MCH 30.9 09/06/2019    MCHC 31.3 09/06/2019    RDW 16.4 09/06/2019     09/06/2019    MPV 10.8 09/06/2019     CMP:    Lab Results   Component Value Date     09/06/2019    K 4.6 09/06/2019    CL 98 09/06/2019    CO2 28 09/06/2019    BUN 36 09/06/2019    CREATININE 1.50 09/06/2019    GFRAA 41 09/06/2019    LABGLOM 34 09/06/2019    GLUCOSE 112 09/06/2019    GLUCOSE 132 03/07/2012    PROT 6.3 06/05/2019    LABALBU 3.2 06/05/2019    LABALBU Detected 11/30/2018    CALCIUM 7.6 09/06/2019    BILITOT 0.16 History of ESBL E. coli infection    Stage 4 chronic kidney disease (HCC)    Fever    Macrocytic anemia    Hypercalcemia    Monoclonal gammopathy of undetermined significance    Acute nonintractable headache    Anemia of chronic renal failure, stage 4 (severe) ([facility]    Traumatic closed displaced fracture of proximal end of right humerus, initial encounter    Urinary tract infection without hematuria    Metabolic encephalopathy    Chest pain    Acute kidney injury ([facility] [address]   Type 2 diabetes with renal complication continue with AC and at bedtime Accu-Cheks with insulin coverage  Acute blood loss anemia  Acute kidney injury CKD 3  Pseudoaneurysm of femoral artery after cardiac  Chronic respiratory failure on home O2     uti hist esbl  ecoli start levaquin id consult  Plan:  Meds labs reviewed continue with before meals and at bedtime Accu-Cheks with insulin coverage continue with physical therapy patient will therapy avoid nephrotoxic drugs        Lencho Martinez MD  10:23 PM

## 2019-09-06 NOTE — PROGRESS NOTES
Injury-multifactorial       A. Bactrim       B. Vasomotor  2. CKD stage 3B/4  3. Hyperkalemia  4. HTN with recent hypotension  5. Abnormal stress test  6. Anemia of chronic disease  7. Right groin pseudoaneurysm  8. Bilateral nephrolithiasis    Plan:  Serum creatinine is better   Hemodynamics stable   Can resume diuretics as needed  Maintain on low K diet. Monitor renal function daily. Avoid nephrotoxic drugs, fleet's enemas, NSAIDs. Adequate Iron studies. Continue aranesp. Hematology following. We will continue to follow up with you. [facility].   Ph: [phone]

## 2019-09-06 NOTE — PLAN OF CARE
Problem: Falls - Risk of:  Goal: Will remain free from falls  Description  Will remain free from falls  Outcome: Ongoing  Goal: Absence of physical injury  Description  Absence of physical injury  Outcome: Ongoing     Problem: Pain:  Goal: Pain level will decrease  Description  Pain level will decrease  Outcome: Ongoing     Problem: Risk for Impaired Skin Integrity  Goal: Tissue integrity - skin and mucous membranes  Description  Structural intactness and normal physiological function of skin and  mucous membranes.   Outcome: Ongoing

## 2019-09-06 NOTE — PROGRESS NOTES
Today's Date: 9/6/2019  Patient Name: Akhil Art  Date of admission: 8/25/2019 10:20 AM  Patient's age: 69 y.o., 1945  Admission Dx: Chest pain [R07.9]  Acute kidney injury ([address] [N17.9]    Reason for Consult: anemia  Requesting Physician: Ashlee Velazco MD    CHIEF COMPLAINT:    Chief Complaint   Patient presents with    Chest Pain     SUBJECTIVE:    Patient seen and examined   Hb  9.0---->7. 5  She did not have a bowel movement yet  We will plan to try suppository  No pain   NO SOB   No chest pain    HISTORY OF PRESENT ILLNESS:    This is a 72-year-old female with medical history of CHF, chronic kidney disease, atrial fibrillation, GERD, hypertension, hyperlipidemia who was admitted on 8/25/2019 with chest pain. She denies any syncopal episode, nausea vomiting. She has chronic anemia since 2013 with hemoglobin ranging around 8-9. She is on Aranesp as per nephrology. Patient has been following with Dr. Parth Whipple as outpatient. She was seen by cardiology. Her nuclear stress test showed reversible ischemia and so cardiac catheterization planned. Her hemoglobin here was noted to be 5.3 and she received PRBC transfusion. Most recent hemoglobin is stable at 8.7.   Past Medical History:   has a past medical history of Acute on chronic diastolic congestive heart failure ([address] Anesthesia complication, Asthma, Atrial fibrillation ([address] Cataracts, bilateral, Cellulitis, Cerebral artery occlusion with cerebral infarction ([address] CHF (congestive heart failure) ([address] Chronic kidney disease, Chronic kidney disease, Closed fracture of proximal end of right humerus with routine healing, Constipation, COPD (chronic obstructive pulmonary disease) ([address] Difficult intravenous access, Difficulty walking, Diverticulosis, DM2 (diabetes mellitus, type 2) ([address] Full dentures, GERD (gastroesophageal reflux disease), H/O fall, Headache(784.0), [place] (hard of hearing), Hyperlipidemia, Hyperplastic polyp of intestine, Hypertension, Impaired ambulation, Kidney stone, Living in nursing home, Morbid obesity with BMI of 40.0-44.9, adult ([facility] [address] Muscle weakness, Open wound, ECTOR on CPAP, Osteoarthritis, Parkinson disease ([facility] [address] Restless leg syndrome, Spinal stenosis in cervical region, Type II or unspecified type diabetes mellitus without mention of complication, not stated as uncontrolled, Urethral caruncle, Wears glasses, and Wears glasses. Past Surgical History:   has a past surgical history that includes Cervical disc surgery (2012); Appendectomy; Tonsillectomy and adenoidectomy (1953); hernia repair (1999); eye surgery (Bilateral, 2017); Cervical spine surgery (5/31/13); laminectomy (05/31/2013); Upper gastrointestinal endoscopy (12/28/2016); Colonoscopy (2009); Colonoscopy (12/29/2016); Colonoscopy (12/30/2016); Colonoscopy (04/25/2017); pr colsc flx w/removal lesion by hot bx forceps (N/A, 4/25/2017); Cystorrhaphy (04/04/2018); pr cystourethroscopy,biopsies (N/A, 4/4/2018); pr [facility] incl fluor gdnce dx w/cell washg spx (N/A, 6/5/2018); Upper gastrointestinal endoscopy (N/A, 8/29/2018); shoulder fracture surgery (Right, 5/28/2019); Hardware Removal (Right, 07/05/2019); and Hardware Removal (Right, 7/5/2019). Medications:    Prior to Admission medications    Medication Sig Start Date End Date Taking?  Authorizing Provider   darbepoetin albaro-polysorbate (ARANESP) 200 MCG/0.4ML SOSY injection Inject 200 mcg into the skin every 28 days   Yes Historical Provider, MD   cyanocobalamin 1000 MCG/ML injection Inject 1,000 mcg into the muscle every 30 days   Yes Historical Provider, MD   sulfamethoxazole-trimethoprim (BACTRIM DS) 800-160 MG per tablet Take 1 tablet by mouth 2 times daily   Yes Historical Provider, MD   melatonin 5 MG TABS tablet Take 5 mg by mouth nightly as needed (sleep)   Yes Historical Provider, MD   Cyanocobalamin 5000 MCG TBDP Take 5,000 mcg by mouth daily   Yes Historical Provider, MD   calcitRIOL (ROCALTROL) mg at 09/05/19 2114    glucose (GLUTOSE) 40 % oral gel 15 g  15 g Oral PRN Marcos Briones MD        dextrose 50 % IV solution  12.5 g Intravenous PRN Marcos Briones MD        glucagon (rDNA) injection 1 mg  1 mg Intramuscular PRN Marcos Briones MD        dextrose 5 % solution  100 mL/hr Intravenous PRN Marcos Briones MD        insulin lispro (HUMALOG) injection vial 0-6 Units  0-6 Units Subcutaneous TID WC Marcos Briones MD   1 Units at 09/05/19 1827    insulin lispro (HUMALOG) injection vial 0-3 Units  0-3 Units Subcutaneous Nightly Marcos Briones MD   1 Units at 09/04/19 2115    albuterol (PROVENTIL) nebulizer solution 2.5 mg  2.5 mg Nebulization As Directed RT PRN Marcos Briones MD        Melatonin CHEW 5 mg  5 mg Oral Nightly PRN Marcos Briones MD   5 mg at 09/05/19 2340    aspirin EC tablet 81 mg  81 mg Oral Daily Marcos Briones MD   81 mg at 09/05/19 1234    pantoprazole (PROTONIX) tablet 40 mg  40 mg Oral BID AC Marcos Briones MD   Stopped at 09/06/19 0700    rasagiline mesylate TABS 1 mg  1 mg Oral Daily Marcos Briones MD   1 mg at 09/05/19 1235       Allergies:  Dye [barium-containing compounds]; Pcn [penicillins]; and Bactrim [sulfamethoxazole-trimethoprim]    Social History:   reports that she quit smoking about 48 years ago. Her smoking use included cigarettes. She has a 30.00 pack-year smoking history. She has never used smokeless tobacco. She reports that she drinks about 2.0 standard drinks of alcohol per week. She reports that she does not use drugs. Family History: family history includes Heart Disease in her mother; Heart Failure in her mother; High Blood Pressure in her mother; Hypertension in her mother. REVIEW OF SYSTEMS:    Constitutional: No fever or chills.  No night sweats, no weight loss   Eyes: No eye discharge, double vision, or eye pain   HEENT: negative for sore mouth, sore throat, hoarseness and voice change   Respiratory: negative for cough ,

## 2019-09-06 NOTE — CONSULTS
database accordingly. Past Medical History:     Past Medical History:   Diagnosis Date    Acute on chronic diastolic congestive heart failure ([facility] [address]     Anesthesia complication     DIFFICULTY WAKING OP    Asthma     Atrial fibrillation ([facility] [address] 2013    Cataracts, bilateral     Cellulitis     BILAT LOWER LEGS-PT STATES IS IMPROVING    Cerebral artery occlusion with cerebral infarction [facility])     Last stroke was February 2017 w/no deficits-TOTAL OF 3    CHF (congestive heart failure) (HCC)     Chronic kidney disease 2013    NOT ON DIALYSIS YET    Chronic kidney disease     Closed fracture of proximal end of right humerus with routine healing     Constipation     CHRONIC    COPD (chronic obstructive pulmonary disease) ([facility] [address] 2008    PT. SEES DR. BECK. Home O2 at 2-3L/NC 24/7.     Difficult intravenous access     VEINS ROLL    Difficulty walking     AMBULATES WITH THERAPPY    Diverticulosis     DM2 (diabetes mellitus, type 2) ([facility] [address] 2008    Full dentures     FULL UPPER ONLY    GERD (gastroesophageal reflux disease) 2008    ON RX    H/O fall     Headache(784.0)     [place] (hard of hearing)     HAS HEARING AIDS BUT DOES NOT WEAR    Hyperlipidemia     Hyperplastic polyp of intestine     Hypertension 2008    Impaired ambulation     USES TRANFER CHAIR WALKER OR CANE    Kidney stone 2018    PRESENTLY-SMALL    Living in nursing home     [address]    Morbid obesity with BMI of 40.0-44.9, adult ([facility] [address] 12/30/2016    Muscle weakness     Open wound     COCCYX    ECTOR on CPAP 2008    USES C-PAP NIGHTLY    Osteoarthritis     OSTEOARTHRITIS    Parkinson disease ([facility] [address] 2015    Restless leg syndrome 2013    MILD    Spinal stenosis in cervical region 6/2/2013    Type II or unspecified type diabetes mellitus without mention of complication, not stated as uncontrolled     Urethral caruncle 3/8/2018    Wears glasses     Wears glasses        Past Surgical  History: violence:     Fear of current or ex partner: Not on file     Emotionally abused: Not on file     Physically abused: Not on file     Forced sexual activity: Not on file   Other Topics Concern    Not on file   Social History Narrative    Not on file       Family History:     Family History   Problem Relation Age of Onset    Heart Failure Mother     Hypertension Mother     Heart Disease Mother     High Blood Pressure Mother         Allergies:   Dye [barium-containing compounds]; Pcn [penicillins]; and Bactrim [sulfamethoxazole-trimethoprim]     Review of Systems:     As per history present illness other than above 14 system reviewed were negative    Physical Examination :     Patient Vitals for the past 8 hrs:   BP Temp Temp src Pulse Resp SpO2   09/06/19 1200 (!) 101/45 97.7 °F (36.5 °C) Temporal 76 16 100 %   09/06/19 0800 (!) 116/53 97.6 °F (36.4 °C) Temporal 74 20 97 %       Physical Exam   Constitutional: She is oriented to person, place, and time. She appears well-developed and well-nourished. HENT:   Head: Normocephalic and atraumatic. Eyes: Pupils are equal, round, and reactive to light. Conjunctivae and EOM are normal.   Neck: Normal range of motion. Neck supple. Cardiovascular: Normal rate and normal heart sounds. No murmur heard. Pulmonary/Chest: Effort normal and breath sounds normal. No respiratory distress. She has no wheezes. Abdominal: Soft. Bowel sounds are normal. There is no tenderness. Musculoskeletal: She exhibits no edema, tenderness or deformity. Neurological: She is alert and oriented to person, place, and time. No cranial nerve deficit. Skin: Skin is warm and dry.    Right groin hematoma         Medical Decision Making:   I have independently reviewed/ordered the following labs:    CBC with Differential:   Recent Labs     09/05/19  0334 09/05/19  1407 09/06/19  0303   WBC 8.0  --  8.9   HGB 6.8* 9.0* 7.5*   HCT 22.7* 29.5* 24.0*   *  --  130*   LYMPHOPCT 15*  --

## 2019-09-07 LAB
ABSOLUTE EOS #: 0.14 K/UL (ref 0–0.44)
ABSOLUTE IMMATURE GRANULOCYTE: 0.22 K/UL (ref 0–0.3)
ABSOLUTE LYMPH #: 0.97 K/UL (ref 1.1–3.7)
ABSOLUTE MONO #: 0.91 K/UL (ref 0.1–1.2)
ANION GAP SERPL CALCULATED.3IONS-SCNC: 13 MMOL/L (ref 9–17)
BASOPHILS # BLD: 0 % (ref 0–2)
BASOPHILS ABSOLUTE: 0.03 K/UL (ref 0–0.2)
BUN BLDV-MCNC: 32 MG/DL (ref 8–23)
BUN/CREAT BLD: 24 (ref 9–20)
CALCIUM SERPL-MCNC: 7.4 MG/DL (ref 8.6–10.4)
CHLORIDE BLD-SCNC: 99 MMOL/L (ref 98–107)
CO2: 29 MMOL/L (ref 20–31)
CREAT SERPL-MCNC: 1.33 MG/DL (ref 0.5–0.9)
DIFFERENTIAL TYPE: ABNORMAL
EOSINOPHILS RELATIVE PERCENT: 2 % (ref 1–4)
GFR AFRICAN AMERICAN: 48 ML/MIN
GFR NON-AFRICAN AMERICAN: 39 ML/MIN
GFR SERPL CREATININE-BSD FRML MDRD: ABNORMAL ML/MIN/{1.73_M2}
GFR SERPL CREATININE-BSD FRML MDRD: ABNORMAL ML/MIN/{1.73_M2}
GLUCOSE BLD-MCNC: 122 MG/DL (ref 65–105)
GLUCOSE BLD-MCNC: 145 MG/DL (ref 65–105)
GLUCOSE BLD-MCNC: 151 MG/DL (ref 70–99)
GLUCOSE BLD-MCNC: 185 MG/DL (ref 65–105)
GLUCOSE BLD-MCNC: 257 MG/DL (ref 65–105)
HCT VFR BLD CALC: 24.1 % (ref 36.3–47.1)
HEMOGLOBIN: 7.5 G/DL (ref 11.9–15.1)
IMMATURE GRANULOCYTES: 3 %
LYMPHOCYTES # BLD: 12 % (ref 24–43)
MAGNESIUM: 1.5 MG/DL (ref 1.6–2.6)
MCH RBC QN AUTO: 30.5 PG (ref 25.2–33.5)
MCHC RBC AUTO-ENTMCNC: 31.1 G/DL (ref 28.4–34.8)
MCV RBC AUTO: 98 FL (ref 82.6–102.9)
MONOCYTES # BLD: 11 % (ref 3–12)
NRBC AUTOMATED: 0 PER 100 WBC
PARTIAL THROMBOPLASTIN TIME: 64.1 SEC (ref 23–31)
PARTIAL THROMBOPLASTIN TIME: 75.9 SEC (ref 23–31)
PDW BLD-RTO: 16.3 % (ref 11.8–14.4)
PHOSPHORUS: 4.6 MG/DL (ref 2.6–4.5)
PLATELET # BLD: 136 K/UL (ref 138–453)
PLATELET ESTIMATE: ABNORMAL
PMV BLD AUTO: 10 FL (ref 8.1–13.5)
POTASSIUM SERPL-SCNC: 4.7 MMOL/L (ref 3.7–5.3)
RBC # BLD: 2.46 M/UL (ref 3.95–5.11)
RBC # BLD: ABNORMAL 10*6/UL
SEG NEUTROPHILS: 73 % (ref 36–65)
SEGMENTED NEUTROPHILS ABSOLUTE COUNT: 6.01 K/UL (ref 1.5–8.1)
SODIUM BLD-SCNC: 141 MMOL/L (ref 135–144)
WBC # BLD: 8.3 K/UL (ref 3.5–11.3)
WBC # BLD: ABNORMAL 10*3/UL

## 2019-09-07 PROCEDURE — 6360000002 HC RX W HCPCS: Performed by: NURSE PRACTITIONER

## 2019-09-07 PROCEDURE — 85730 THROMBOPLASTIN TIME PARTIAL: CPT

## 2019-09-07 PROCEDURE — 6370000000 HC RX 637 (ALT 250 FOR IP): Performed by: INTERNAL MEDICINE

## 2019-09-07 PROCEDURE — 2580000003 HC RX 258: Performed by: INTERNAL MEDICINE

## 2019-09-07 PROCEDURE — 82947 ASSAY GLUCOSE BLOOD QUANT: CPT

## 2019-09-07 PROCEDURE — 6360000002 HC RX W HCPCS: Performed by: SURGERY

## 2019-09-07 PROCEDURE — 6370000000 HC RX 637 (ALT 250 FOR IP): Performed by: NURSE PRACTITIONER

## 2019-09-07 PROCEDURE — 2060000000 HC ICU INTERMEDIATE R&B

## 2019-09-07 PROCEDURE — 85025 COMPLETE CBC W/AUTO DIFF WBC: CPT

## 2019-09-07 PROCEDURE — 80048 BASIC METABOLIC PNL TOTAL CA: CPT

## 2019-09-07 PROCEDURE — 84100 ASSAY OF PHOSPHORUS: CPT

## 2019-09-07 PROCEDURE — 6360000002 HC RX W HCPCS: Performed by: INTERNAL MEDICINE

## 2019-09-07 PROCEDURE — 99232 SBSQ HOSP IP/OBS MODERATE 35: CPT | Performed by: INTERNAL MEDICINE

## 2019-09-07 PROCEDURE — 94640 AIRWAY INHALATION TREATMENT: CPT

## 2019-09-07 PROCEDURE — 36415 COLL VENOUS BLD VENIPUNCTURE: CPT

## 2019-09-07 PROCEDURE — 83735 ASSAY OF MAGNESIUM: CPT

## 2019-09-07 PROCEDURE — 6370000000 HC RX 637 (ALT 250 FOR IP): Performed by: SURGERY

## 2019-09-07 RX ORDER — GUAIFENESIN 600 MG/1
600 TABLET, EXTENDED RELEASE ORAL 2 TIMES DAILY
Status: DISCONTINUED | OUTPATIENT
Start: 2019-09-07 | End: 2019-09-09

## 2019-09-07 RX ORDER — MAGNESIUM SULFATE 1 G/100ML
1 INJECTION INTRAVENOUS ONCE
Status: COMPLETED | OUTPATIENT
Start: 2019-09-07 | End: 2019-09-07

## 2019-09-07 RX ORDER — MAGNESIUM SULFATE 1 G/100ML
1 INJECTION INTRAVENOUS
Status: COMPLETED | OUTPATIENT
Start: 2019-09-07 | End: 2019-09-07

## 2019-09-07 RX ADMIN — ASPIRIN 81 MG: 81 TABLET, COATED ORAL at 08:31

## 2019-09-07 RX ADMIN — HYDROCODONE BITARTRATE AND ACETAMINOPHEN 1 TABLET: 5; 325 TABLET ORAL at 21:10

## 2019-09-07 RX ADMIN — INSULIN LISPRO 3 UNITS: 100 INJECTION, SOLUTION INTRAVENOUS; SUBCUTANEOUS at 17:49

## 2019-09-07 RX ADMIN — PANTOPRAZOLE SODIUM 40 MG: 40 TABLET, DELAYED RELEASE ORAL at 16:30

## 2019-09-07 RX ADMIN — INSULIN LISPRO 1 UNITS: 100 INJECTION, SOLUTION INTRAVENOUS; SUBCUTANEOUS at 21:53

## 2019-09-07 RX ADMIN — PANTOPRAZOLE SODIUM 40 MG: 40 TABLET, DELAYED RELEASE ORAL at 05:28

## 2019-09-07 RX ADMIN — AMIODARONE HYDROCHLORIDE 200 MG: 200 TABLET ORAL at 08:31

## 2019-09-07 RX ADMIN — HYDROCODONE BITARTRATE AND ACETAMINOPHEN 1 TABLET: 5; 325 TABLET ORAL at 08:30

## 2019-09-07 RX ADMIN — HYDROCODONE BITARTRATE AND ACETAMINOPHEN 1 TABLET: 5; 325 TABLET ORAL at 14:29

## 2019-09-07 RX ADMIN — IPRATROPIUM BROMIDE AND ALBUTEROL SULFATE 3 ML: .5; 3 SOLUTION RESPIRATORY (INHALATION) at 14:22

## 2019-09-07 RX ADMIN — CEPHALEXIN 500 MG: 500 CAPSULE ORAL at 08:35

## 2019-09-07 RX ADMIN — INSULIN LISPRO 1 UNITS: 100 INJECTION, SOLUTION INTRAVENOUS; SUBCUTANEOUS at 12:27

## 2019-09-07 RX ADMIN — HEPARIN SODIUM AND DEXTROSE 20 UNITS/KG/HR: 10000; 5 INJECTION INTRAVENOUS at 13:26

## 2019-09-07 RX ADMIN — IRON SUCROSE 200 MG: 20 INJECTION, SOLUTION INTRAVENOUS at 18:43

## 2019-09-07 RX ADMIN — CEPHALEXIN 500 MG: 500 CAPSULE ORAL at 22:09

## 2019-09-07 RX ADMIN — MAGNESIUM SULFATE IN DEXTROSE 1 G: 10 INJECTION, SOLUTION INTRAVENOUS at 21:12

## 2019-09-07 RX ADMIN — GUAIFENESIN 600 MG: 600 TABLET, EXTENDED RELEASE ORAL at 22:09

## 2019-09-07 RX ADMIN — CARBIDOPA AND LEVODOPA 1 TABLET: 25; 100 TABLET, EXTENDED RELEASE ORAL at 14:29

## 2019-09-07 RX ADMIN — ROPINIROLE HYDROCHLORIDE 2 MG: 2 TABLET, FILM COATED ORAL at 08:31

## 2019-09-07 RX ADMIN — ANTI-FUNGAL POWDER MICONAZOLE NITRATE TALC FREE: 1.42 POWDER TOPICAL at 08:36

## 2019-09-07 RX ADMIN — CYCLOBENZAPRINE HYDROCHLORIDE 5 MG: 5 TABLET, FILM COATED ORAL at 22:43

## 2019-09-07 RX ADMIN — SENNOSIDES 17.2 MG: 8.6 TABLET, FILM COATED ORAL at 21:51

## 2019-09-07 RX ADMIN — MAGNESIUM SULFATE HEPTAHYDRATE 1 G: 1 INJECTION, SOLUTION INTRAVENOUS at 09:44

## 2019-09-07 RX ADMIN — Medication 1 MG: at 16:31

## 2019-09-07 RX ADMIN — Medication 5 MG: at 21:48

## 2019-09-07 RX ADMIN — RASAGILINE 1 MG: 1 TABLET ORAL at 11:55

## 2019-09-07 RX ADMIN — METOPROLOL TARTRATE 25 MG: 25 TABLET, FILM COATED ORAL at 08:31

## 2019-09-07 RX ADMIN — CARBIDOPA AND LEVODOPA 1 TABLET: 25; 100 TABLET, EXTENDED RELEASE ORAL at 08:31

## 2019-09-07 RX ADMIN — MAGNESIUM SULFATE IN DEXTROSE 1 G: 10 INJECTION, SOLUTION INTRAVENOUS at 22:46

## 2019-09-07 RX ADMIN — METOPROLOL TARTRATE 25 MG: 25 TABLET, FILM COATED ORAL at 21:58

## 2019-09-07 RX ADMIN — IPRATROPIUM BROMIDE AND ALBUTEROL SULFATE 3 ML: .5; 3 SOLUTION RESPIRATORY (INHALATION) at 19:42

## 2019-09-07 RX ADMIN — CARBIDOPA AND LEVODOPA 1 TABLET: 25; 100 TABLET, EXTENDED RELEASE ORAL at 21:49

## 2019-09-07 RX ADMIN — APIXABAN 10 MG: 5 TABLET, FILM COATED ORAL at 16:30

## 2019-09-07 RX ADMIN — ROPINIROLE HYDROCHLORIDE 2 MG: 2 TABLET, FILM COATED ORAL at 14:29

## 2019-09-07 RX ADMIN — ROPINIROLE HYDROCHLORIDE 2 MG: 2 TABLET, FILM COATED ORAL at 21:50

## 2019-09-07 RX ADMIN — LINAGLIPTIN 2.5 MG: 5 TABLET, FILM COATED ORAL at 08:32

## 2019-09-07 RX ADMIN — CALCITRIOL 0.25 MCG: 0.25 CAPSULE ORAL at 08:31

## 2019-09-07 RX ADMIN — ATORVASTATIN CALCIUM 20 MG: 20 TABLET, FILM COATED ORAL at 21:51

## 2019-09-07 ASSESSMENT — PAIN DESCRIPTION - ORIENTATION: ORIENTATION: RIGHT

## 2019-09-07 ASSESSMENT — PAIN SCALES - GENERAL
PAINLEVEL_OUTOF10: 10
PAINLEVEL_OUTOF10: 10
PAINLEVEL_OUTOF10: 0
PAINLEVEL_OUTOF10: 9
PAINLEVEL_OUTOF10: 7

## 2019-09-07 ASSESSMENT — PAIN DESCRIPTION - LOCATION: LOCATION: LEG

## 2019-09-07 ASSESSMENT — PAIN DESCRIPTION - PAIN TYPE: TYPE: ACUTE PAIN

## 2019-09-07 NOTE — PROGRESS NOTES
Wt 191 lb 3.2 oz (86.7 kg)   LMP 04/03/2004 (Within Years)   SpO2 100%   BMI 37.34 kg/m²   General appearance: alert, cooperative and no distress  Mental Status: oriented to person, place and time with normal affect  Neck: good carotid pulses, no JVD  Lungs: clear to auscultation bilaterally, normal effort  Heart: regular rate and rhythm, no murmur,  Abdomen: soft, non-tender, non-distended, bowel sounds present all four quadrants, no masses, hepatomegaly, splenomegaly or aortic enlargement  Extremities: Right leg ecchymoses and edema. Palpable DP pulses bilaterally  Skin: no gross lesions, rashes, or induration      Assessment:   1. 69 yr old female with right fem  pseudoaneurysm status post thrombin injection.      Patient Active Problem List:     Controlled type 2 diabetes mellitus with stage 3 chronic kidney disease, without long-term current use of insulin (HCC)     Hyperlipidemia     Essential hypertension     Chronic leg pain     Paroxysmal atrial fibrillation (HCC)     Asthma     Gastroesophageal reflux disease without esophagitis     ECTOR on CPAP     Type 2 diabetes mellitus with complication, without long-term current use of insulin (HCC)     Spinal stenosis in cervical region     Hypomagnesemia     Hyperphosphaturia     Hypocalcemia     Parkinson's disease ([address]     Acute renal failure ([address]     Acute cystitis with hematuria     Altered mental status     Iron deficiency anemia due to chronic blood loss     Morbid obesity with BMI of 40.0-44.9, adult (HCC)     Hyperplastic polyp of intestine     Diverticulosis     Panlobular emphysema (HCC)     Rapid atrial fibrillation (HCC)     CKD (chronic kidney disease) stage 3, GFR 30-59 ml/min (HCC)     KRISTIN (acute kidney injury) ([address]     Anemia in chronic kidney disease     Chronic respiratory failure with hypoxia and hypercapnia (HCC)     Acute kidney injury superimposed on chronic kidney disease ([address]     CKD stage 4 due to type 2 diabetes mellitus ([address]

## 2019-09-07 NOTE — PROGRESS NOTES
_    Today's Date: 9/7/2019  Patient Name: Anand Daley  Date of admission: 8/25/2019 10:20 AM  Patient's age: 69 y.o., 1945  Admission Dx: Chest pain [R07.9]  Acute kidney injury ([address] [N17.9]    Reason for Consult: anemia  Requesting Physician: Lea Hayes MD    CHIEF COMPLAINT:    Chief Complaint   Patient presents with    Chest Pain     SUBJECTIVE:    Patient seen and examined. Clinically stable. No events overnight. Hb  9.0---->7.5-->7. 5  She did not have a bowel movement yet  No active bleeding. No pain,  NO SOB,  No chest pain    HISTORY OF PRESENT ILLNESS:    This is a 68-year-old female with medical history of CHF, chronic kidney disease, atrial fibrillation, GERD, hypertension, hyperlipidemia who was admitted on 8/25/2019 with chest pain. She denies any syncopal episode, nausea vomiting. She has chronic anemia since 2013 with hemoglobin ranging around 8-9. She is on Aranesp as per nephrology. Patient has been following with Dr. Jeff Castro as outpatient. She was seen by cardiology. Her nuclear stress test showed reversible ischemia and so cardiac catheterization planned. Her hemoglobin here was noted to be 5.3 and she received PRBC transfusion. Most recent hemoglobin is stable at 8.7.   Past Medical History:   has a past medical history of Acute on chronic diastolic congestive heart failure ([address] Anesthesia complication, Asthma, Atrial fibrillation ([address] Cataracts, bilateral, Cellulitis, Cerebral artery occlusion with cerebral infarction ([address] CHF (congestive heart failure) ([address] Chronic kidney disease, Chronic kidney disease, Closed fracture of proximal end of right humerus with routine healing, Constipation, COPD (chronic obstructive pulmonary disease) ([address] Difficult intravenous access, Difficulty walking, Diverticulosis, DM2 (diabetes mellitus, type 2) ([address] Full dentures, GERD (gastroesophageal reflux disease), H/O fall, Headache(784.0), [place] (hard of hearing), Hyperlipidemia, Hyperplastic polyp of intestine, Hypertension, Impaired ambulation, Kidney stone, Living in nursing home, Morbid obesity with BMI of 40.0-44.9, adult ([facility] Muscle weakness, Open wound, ECTOR on CPAP, Osteoarthritis, Parkinson disease ([facility] [address] Restless leg syndrome, Spinal stenosis in cervical region, Type II or unspecified type diabetes mellitus without mention of complication, not stated as uncontrolled, Urethral caruncle, Wears glasses, and Wears glasses. Past Surgical History:   has a past surgical history that includes Cervical disc surgery (2012); Appendectomy; Tonsillectomy and adenoidectomy (1953); hernia repair (1999); eye surgery (Bilateral, 2017); Cervical spine surgery (5/31/13); laminectomy (05/31/2013); Upper gastrointestinal endoscopy (12/28/2016); Colonoscopy (2009); Colonoscopy (12/29/2016); Colonoscopy (12/30/2016); Colonoscopy (04/25/2017); pr colsc flx w/removal lesion by hot bx forceps (N/A, 4/25/2017); Cystorrhaphy (04/04/2018); pr cystourethroscopy,biopsies (N/A, 4/4/2018); pr [facility] incl fluor gdnce dx w/cell washg spx (N/A, 6/5/2018); Upper gastrointestinal endoscopy (N/A, 8/29/2018); shoulder fracture surgery (Right, 5/28/2019); Hardware Removal (Right, 07/05/2019); and Hardware Removal (Right, 7/5/2019). Medications:    Prior to Admission medications    Medication Sig Start Date End Date Taking?  Authorizing Provider   darbepoetin albaro-polysorbate (ARANESP) 200 MCG/0.4ML SOSY injection Inject 200 mcg into the skin every 28 days   Yes Historical Provider, MD   cyanocobalamin 1000 MCG/ML injection Inject 1,000 mcg into the muscle every 30 days   Yes Historical Provider, MD   sulfamethoxazole-trimethoprim (BACTRIM DS) 800-160 MG per tablet Take 1 tablet by mouth 2 times daily   Yes Historical Provider, MD   melatonin 5 MG TABS tablet Take 5 mg by mouth nightly as needed (sleep)   Yes Historical Provider, MD   Cyanocobalamin 5000 MCG TBDP Take 5,000 mcg by mouth daily   Yes Lesly Hill, DO   ONETOUCH VERIO strip 1 each by In Vitro route daily As needed. 3/4/19   Lesly Hill, DO   ONE TOUCH ULTRA TEST strip USE 1 STRIP THREE TIMES A DAY 12/26/18   Lesly Hill DO   albuterol (ACCUNEB) 1.25 MG/3ML nebulizer solution Inhale 1 ampule into the lungs every 6 hours as needed for Wheezing or Shortness of Breath    Historical Provider, MD Geni Martínez LANCETS 56M MISC 1 Units by Does not apply route 2 times daily 10/23/18   Lesly Hill DO   magnesium oxide (MAG-OX) 400 (241.3 Mg) MG TABS tablet Take 1 tablet by mouth 2 times daily 9/7/18   Ariana Marcos, DO   vitamin D (CHOLECALCIFEROL) 5000 units CAPS capsule Take 5,000 Units by mouth daily    Historical Provider, MD   Oxygen Concentrator Dx: Pneumonia, use as directed. Patient taking differently: Dx: Pneumonia, use as directed.    ON 24/7 2/10/17   Sami Hill DO     Current Facility-Administered Medications   Medication Dose Route Frequency Provider Last Rate Last Dose    bisacodyl (DULCOLAX) suppository 10 mg  10 mg Rectal Daily PRN Buffy Fernando MD   10 mg at 09/06/19 1437    cephALEXin (KEFLEX) capsule 500 mg  500 mg Oral 2 times per day Cristino Basurto MD   500 mg at 09/07/19 0835    0.9 % sodium chloride bolus  250 mL Intravenous Once Kim Costa MD        heparin 25,000 units in dextrose 5% 250 mL infusion  18 Units/kg/hr Intravenous Continuous Ceferino Montana MD 17.2 mL/hr at 09/07/19 0606 20 Units/kg/hr at 09/07/19 0606    cyclobenzaprine (FLEXERIL) tablet 5 mg  5 mg Oral BID PRN Kim Costa MD   5 mg at 09/06/19 2050    polyethylene glycol (GLYCOLAX) packet 17 g  17 g Oral Daily PRN Buffy Fernando MD   17 g at 09/05/19 1459    0.9 % sodium chloride bolus  250 mL Intravenous Once Kim Costa MD        HYDROcodone-acetaminophen (NORCO) 5-325 MG per tablet 1 tablet  1 tablet Oral Q6H PRN Kim Costa MD   1 tablet at 09/07/19 9705    sodium chloride flush 0.9 % injection 10 mL  10 mL 5724    senna (SENOKOT) tablet 17.2 mg  2 tablet Oral Nightly Alistair Allen MD   17.2 mg at 09/06/19 2013    rOPINIRole (REQUIP) tablet 2 mg  2 mg Oral TID Alistair Allen MD   2 mg at 09/07/19 0831    glucose (GLUTOSE) 40 % oral gel 15 g  15 g Oral PRN Alistair Allen MD        dextrose 50 % IV solution  12.5 g Intravenous PRN Alistair Allen MD        glucagon (rDNA) injection 1 mg  1 mg Intramuscular PRN Alistair Allen MD        dextrose 5 % solution  100 mL/hr Intravenous PRN Alistair Allen MD        insulin lispro (HUMALOG) injection vial 0-6 Units  0-6 Units Subcutaneous TID WC Alistair Allen MD   1 Units at 09/05/19 1827    insulin lispro (HUMALOG) injection vial 0-3 Units  0-3 Units Subcutaneous Nightly Alistair Allen MD   1 Units at 09/06/19 2012    albuterol (PROVENTIL) nebulizer solution 2.5 mg  2.5 mg Nebulization As Directed RT PRN Alistair Allen MD        Melatonin CHEW 5 mg  5 mg Oral Nightly PRN Alistair Allen MD   5 mg at 09/05/19 2340    aspirin EC tablet 81 mg  81 mg Oral Daily Alistair Allen MD   81 mg at 09/07/19 0831    pantoprazole (PROTONIX) tablet 40 mg  40 mg Oral BID AC Alistair Allen MD   40 mg at 09/07/19 7105    rasagiline mesylate TABS 1 mg  1 mg Oral Daily Alistair Allen MD   1 mg at 09/07/19 1155       Allergies:  Dye [barium-containing compounds]; Pcn [penicillins]; and Bactrim [sulfamethoxazole-trimethoprim]    Social History:   reports that she quit smoking about 48 years ago. Her smoking use included cigarettes. She has a 30.00 pack-year smoking history. She has never used smokeless tobacco. She reports that she drinks about 2.0 standard drinks of alcohol per week. She reports that she does not use drugs. Family History: family history includes Heart Disease in her mother; Heart Failure in her mother; High Blood Pressure in her mother; Hypertension in her mother. REVIEW OF SYSTEMS:    Constitutional: No fever or chills.  No night sweats, no weight loss   Eyes: No eye discharge, double vision, or eye pain   HEENT: negative for sore mouth, sore throat, hoarseness and voice change   Respiratory: negative for cough , sputum, dyspnea, wheezing, hemoptysis, chest pain   Cardiovascular: + chest pain, dyspnea, palpitations, orthopnea, PND   Gastrointestinal: negative for nausea, vomiting, diarrhea, constipation, abdominal pain, Dysphagia, hematemesis and hematochezia   Genitourinary: negative for frequency, dysuria, nocturia, urinary incontinence, and hematuria   Integument: negative for rash, skin lesions, bruises.    Hematologic/Lymphatic: negative for easy bruising, bleeding, lymphadenopathy, or petechiae   Endocrine: negative for heat or cold intolerance,weight changes, change in bowel habits and hair loss   Musculoskeletal: negative for myalgias, arthralgias, pain, joint swelling,and bone pain   Neurological: negative for headaches, dizziness, seizures, weakness, numbness    PHYSICAL EXAM:      /62   Pulse 77   Temp 98.3 °F (36.8 °C) (Temporal)   Resp 16   Ht 5' (1.524 m)   Wt 191 lb 3.2 oz (86.7 kg)   LMP 2004 (Within Years)   SpO2 96%   BMI 37.34 kg/m²    Temp (24hrs), Av.2 °F (36.8 °C), Min:97.7 °F (36.5 °C), Max:98.6 °F (37 °C)    General appearance - well appearing, no in pain or distress   Mental status - alert and cooperative   Eyes - pupils equal and reactive, extraocular eye movements intact   Ears - bilateral TM's and external ear canals normal   Mouth - mucous membranes moist, pharynx normal without lesions   Neck - supple, no significant adenopathy   Lymphatics - no palpable lymphadenopathy, no hepatosplenomegaly   Chest - clear to auscultation, no wheezes, rales or rhonchi, symmetric air entry   Heart - normal rate, regular rhythm, normal S1, S2, no murmurs  Abdomen - soft, nontender, nondistended, no masses or organomegaly   Neurological - alert, oriented, normal speech, no focal findings or movement disorder noted

## 2019-09-07 NOTE — PROGRESS NOTES
K 4.6 09/06/2019    K 4.6 09/05/2019    CL 99 09/07/2019    CL 98 09/06/2019     09/05/2019    CO2 29 09/07/2019    CO2 28 09/06/2019    CO2 29 09/05/2019    BUN 32 (H) 09/07/2019    BUN 36 (H) 09/06/2019    BUN 37 (H) 09/05/2019    CREATININE 1.33 (H) 09/07/2019    CREATININE 1.50 (H) 09/06/2019    CREATININE 1.54 (H) 09/05/2019    GLUCOSE 151 (H) 09/07/2019    GLUCOSE 112 (H) 09/06/2019    GLUCOSE 111 (H) 09/05/2019     CMP:   Lab Results   Component Value Date     09/07/2019    K 4.7 09/07/2019    CL 99 09/07/2019    CO2 29 09/07/2019    BUN 32 09/07/2019    CREATININE 1.33 09/07/2019    GLUCOSE 151 09/07/2019    GLUCOSE 132 03/07/2012    CALCIUM 7.4 09/07/2019    PROT 6.3 06/05/2019    LABALBU 3.2 06/05/2019    LABALBU Detected 11/30/2018    BILITOT 0.16 09/01/2019    ALKPHOS 67 06/05/2019    AST 17 06/05/2019    ALT <5 06/05/2019      Hepatic:   Lab Results   Component Value Date    AST 17 06/05/2019    AST 17 06/04/2019    AST 14 11/28/2018    ALT <5 (L) 06/05/2019    ALT <5 (L) 06/04/2019    ALT <5 (L) 11/28/2018    BILITOT 0.16 (L) 09/01/2019    BILITOT 0.43 06/05/2019    BILITOT 0.52 06/04/2019    ALKPHOS 67 06/05/2019    ALKPHOS 75 06/04/2019    ALKPHOS 42 11/28/2018     BNP:   Lab Results   Component Value Date    BNP 40 06/07/2013     (H) 05/31/2013     Lipids:   Lab Results   Component Value Date    CHOL 135 08/26/2017    HDL 30 (L) 08/26/2017     INR:   Lab Results   Component Value Date    INR 0.9 09/01/2019    INR 1.0 08/30/2019    INR 1.0 08/25/2019     PTH: No results found for: PTH  Phosphorus:    Lab Results   Component Value Date    PHOS 4.6 09/07/2019     Ionized Calcium: No results found for: IONCA  Magnesium:   Lab Results   Component Value Date    MG 1.5 09/07/2019     Albumin:   Lab Results   Component Value Date    LABALBU 3.2 06/05/2019    LABALBU Detected 11/30/2018     Last 3 CK, CKMB, Troponin: @LABRCNT(CKTOTAL:3,CKMB:3,TROPONINI:3)       URINE:)No results found for:

## 2019-09-07 NOTE — PLAN OF CARE
Problem: Falls - Risk of:  Goal: Will remain free from falls  Description  Will remain free from falls  Outcome: Ongoing  Patient is fall risk per fall scale. Falling star on door. Fall sticker on armband. Hourly rounding performed. Personal belongings and call light within reach. Bed in low position. Problem: Pain:  Goal: Control of acute pain  Description  Control of acute pain  Outcome: Ongoing  Patient states understanding of pain scale and interventions. Pain assessed with hourly rounding and PRN. Patient states pain level is 6 /10. Will continue to monitor. Problem: Risk for Impaired Skin Integrity  Goal: Tissue integrity - skin and mucous membranes  Description  Structural intactness and normal physiological function of skin and  mucous membranes. Outcome: Ongoing  Mucus membranes moist. Patient turned and repositioned as needed. Heels elevated as needed. Dressings changed as needed and per orders. Continue to monitor skin for breakdown.

## 2019-09-07 NOTE — PROGRESS NOTES
Dr. Whittaker Ban in to see patient. Plan to use eliquis for treatment of pseudo aneurysm after discharge from SNF. Needed to have prior authorization. Call placed to [facility] [employer] for MAMADOU Mcdonough from [employer] given patient information and contact information for the patient and the hospital unit. Medication reviewed and need for medication discussed. Patient is approved for prior authorization for eliquis from 8/8/2019 through 9/6/2019. Approval number is [id number]. Call placed to pharmacy to check coverage: patient approved and has no Copay. Will discuss with nurse.

## 2019-09-07 NOTE — PROGRESS NOTES
Physical Therapy  DATE: 2019    NAME: Benjie Samayoa  MRN: [id number]   : 1945    Patient not seen this date for Physical Therapy due to:  [] Blood transfusion in progress  [x] Cancel by RN         Strict bed rest orders  [] Hemodialysis  []  Refusal by Patient   [] Spine Precautions   [] Strict Bedrest  [] Surgery  [] Testing      [] Other        [] PT being discontinued at this time. Patient independent. No further needs. [] PT being discontinued at this time as the patient has been transferred to hospice care. No further needs.     Gege Jones, PTA

## 2019-09-07 NOTE — PROGRESS NOTES
Intermittent chest pain  No dyspnea  Events over the last 48 hrs regarding her leg noted  Plan for 3 months of anticoagulation by vascular surgery noted  CV status is stable.

## 2019-09-08 LAB
ABSOLUTE EOS #: 0.11 K/UL (ref 0–0.44)
ABSOLUTE IMMATURE GRANULOCYTE: 0.34 K/UL (ref 0–0.3)
ABSOLUTE LYMPH #: 0.75 K/UL (ref 1.1–3.7)
ABSOLUTE MONO #: 1.21 K/UL (ref 0.1–1.2)
ANION GAP SERPL CALCULATED.3IONS-SCNC: 11 MMOL/L (ref 9–17)
BASOPHILS # BLD: 0 % (ref 0–2)
BASOPHILS ABSOLUTE: 0.03 K/UL (ref 0–0.2)
BUN BLDV-MCNC: 35 MG/DL (ref 8–23)
BUN/CREAT BLD: 23 (ref 9–20)
CALCIUM SERPL-MCNC: 7.4 MG/DL (ref 8.6–10.4)
CHLORIDE BLD-SCNC: 96 MMOL/L (ref 98–107)
CO2: 29 MMOL/L (ref 20–31)
CREAT SERPL-MCNC: 1.5 MG/DL (ref 0.5–0.9)
DIFFERENTIAL TYPE: ABNORMAL
EOSINOPHILS RELATIVE PERCENT: 1 % (ref 1–4)
GFR AFRICAN AMERICAN: 41 ML/MIN
GFR NON-AFRICAN AMERICAN: 34 ML/MIN
GFR SERPL CREATININE-BSD FRML MDRD: ABNORMAL ML/MIN/{1.73_M2}
GFR SERPL CREATININE-BSD FRML MDRD: ABNORMAL ML/MIN/{1.73_M2}
GLUCOSE BLD-MCNC: 122 MG/DL (ref 65–105)
GLUCOSE BLD-MCNC: 139 MG/DL (ref 65–105)
GLUCOSE BLD-MCNC: 164 MG/DL (ref 70–99)
GLUCOSE BLD-MCNC: 166 MG/DL (ref 65–105)
GLUCOSE BLD-MCNC: 205 MG/DL (ref 65–105)
HCT VFR BLD CALC: 23.5 % (ref 36.3–47.1)
HEMOGLOBIN: 7.1 G/DL (ref 11.9–15.1)
IMMATURE GRANULOCYTES: 3 %
LYMPHOCYTES # BLD: 7 % (ref 24–43)
MAGNESIUM: 2.1 MG/DL (ref 1.6–2.6)
MCH RBC QN AUTO: 29.8 PG (ref 25.2–33.5)
MCHC RBC AUTO-ENTMCNC: 30.2 G/DL (ref 28.4–34.8)
MCV RBC AUTO: 98.7 FL (ref 82.6–102.9)
MONOCYTES # BLD: 10 % (ref 3–12)
NRBC AUTOMATED: 0 PER 100 WBC
PARTIAL THROMBOPLASTIN TIME: 33.7 SEC (ref 23–31)
PDW BLD-RTO: 16 % (ref 11.8–14.4)
PHOSPHORUS: 5.2 MG/DL (ref 2.6–4.5)
PLATELET # BLD: 155 K/UL (ref 138–453)
PLATELET ESTIMATE: ABNORMAL
PMV BLD AUTO: 10.1 FL (ref 8.1–13.5)
POTASSIUM SERPL-SCNC: 4.8 MMOL/L (ref 3.7–5.3)
RBC # BLD: 2.38 M/UL (ref 3.95–5.11)
RBC # BLD: ABNORMAL 10*6/UL
SEG NEUTROPHILS: 79 % (ref 36–65)
SEGMENTED NEUTROPHILS ABSOLUTE COUNT: 9.16 K/UL (ref 1.5–8.1)
SODIUM BLD-SCNC: 136 MMOL/L (ref 135–144)
WBC # BLD: 11.6 K/UL (ref 3.5–11.3)
WBC # BLD: ABNORMAL 10*3/UL

## 2019-09-08 PROCEDURE — 85025 COMPLETE CBC W/AUTO DIFF WBC: CPT

## 2019-09-08 PROCEDURE — 83735 ASSAY OF MAGNESIUM: CPT

## 2019-09-08 PROCEDURE — 80048 BASIC METABOLIC PNL TOTAL CA: CPT

## 2019-09-08 PROCEDURE — P9016 RBC LEUKOCYTES REDUCED: HCPCS

## 2019-09-08 PROCEDURE — 6370000000 HC RX 637 (ALT 250 FOR IP): Performed by: INTERNAL MEDICINE

## 2019-09-08 PROCEDURE — 99232 SBSQ HOSP IP/OBS MODERATE 35: CPT | Performed by: INTERNAL MEDICINE

## 2019-09-08 PROCEDURE — 6370000000 HC RX 637 (ALT 250 FOR IP): Performed by: NURSE PRACTITIONER

## 2019-09-08 PROCEDURE — 6370000000 HC RX 637 (ALT 250 FOR IP): Performed by: SURGERY

## 2019-09-08 PROCEDURE — 36430 TRANSFUSION BLD/BLD COMPNT: CPT

## 2019-09-08 PROCEDURE — 85730 THROMBOPLASTIN TIME PARTIAL: CPT

## 2019-09-08 PROCEDURE — 36415 COLL VENOUS BLD VENIPUNCTURE: CPT

## 2019-09-08 PROCEDURE — 6360000002 HC RX W HCPCS: Performed by: NURSE PRACTITIONER

## 2019-09-08 PROCEDURE — 86900 BLOOD TYPING SEROLOGIC ABO: CPT

## 2019-09-08 PROCEDURE — 2700000000 HC OXYGEN THERAPY PER DAY

## 2019-09-08 PROCEDURE — 2060000000 HC ICU INTERMEDIATE R&B

## 2019-09-08 PROCEDURE — 94640 AIRWAY INHALATION TREATMENT: CPT

## 2019-09-08 PROCEDURE — 6360000002 HC RX W HCPCS: Performed by: INTERNAL MEDICINE

## 2019-09-08 PROCEDURE — 84100 ASSAY OF PHOSPHORUS: CPT

## 2019-09-08 PROCEDURE — 82947 ASSAY GLUCOSE BLOOD QUANT: CPT

## 2019-09-08 PROCEDURE — 2580000003 HC RX 258: Performed by: INTERNAL MEDICINE

## 2019-09-08 RX ORDER — 0.9 % SODIUM CHLORIDE 0.9 %
250 INTRAVENOUS SOLUTION INTRAVENOUS ONCE
Status: COMPLETED | OUTPATIENT
Start: 2019-09-08 | End: 2019-09-09

## 2019-09-08 RX ADMIN — ROPINIROLE HYDROCHLORIDE 2 MG: 2 TABLET, FILM COATED ORAL at 20:50

## 2019-09-08 RX ADMIN — IPRATROPIUM BROMIDE AND ALBUTEROL SULFATE 3 ML: .5; 3 SOLUTION RESPIRATORY (INHALATION) at 19:54

## 2019-09-08 RX ADMIN — CARBIDOPA AND LEVODOPA 1 TABLET: 25; 100 TABLET, EXTENDED RELEASE ORAL at 09:34

## 2019-09-08 RX ADMIN — PANTOPRAZOLE SODIUM 40 MG: 40 TABLET, DELAYED RELEASE ORAL at 17:31

## 2019-09-08 RX ADMIN — SODIUM CHLORIDE 250 ML: 9 INJECTION, SOLUTION INTRAVENOUS at 14:00

## 2019-09-08 RX ADMIN — GUAIFENESIN 600 MG: 600 TABLET, EXTENDED RELEASE ORAL at 20:50

## 2019-09-08 RX ADMIN — LINAGLIPTIN 2.5 MG: 5 TABLET, FILM COATED ORAL at 09:35

## 2019-09-08 RX ADMIN — HYDROCODONE BITARTRATE AND ACETAMINOPHEN 1 TABLET: 5; 325 TABLET ORAL at 09:44

## 2019-09-08 RX ADMIN — CYCLOBENZAPRINE HYDROCHLORIDE 5 MG: 5 TABLET, FILM COATED ORAL at 10:16

## 2019-09-08 RX ADMIN — HYDROCODONE BITARTRATE AND ACETAMINOPHEN 1 TABLET: 5; 325 TABLET ORAL at 19:18

## 2019-09-08 RX ADMIN — ANTI-FUNGAL POWDER MICONAZOLE NITRATE TALC FREE: 1.42 POWDER TOPICAL at 20:56

## 2019-09-08 RX ADMIN — CARBIDOPA AND LEVODOPA 1 TABLET: 25; 100 TABLET, EXTENDED RELEASE ORAL at 14:11

## 2019-09-08 RX ADMIN — CEPHALEXIN 500 MG: 500 CAPSULE ORAL at 09:34

## 2019-09-08 RX ADMIN — PANTOPRAZOLE SODIUM 40 MG: 40 TABLET, DELAYED RELEASE ORAL at 05:54

## 2019-09-08 RX ADMIN — CEPHALEXIN 500 MG: 500 CAPSULE ORAL at 20:50

## 2019-09-08 RX ADMIN — SODIUM CHLORIDE, PRESERVATIVE FREE 10 ML: 5 INJECTION INTRAVENOUS at 09:34

## 2019-09-08 RX ADMIN — IPRATROPIUM BROMIDE AND ALBUTEROL SULFATE 3 ML: .5; 3 SOLUTION RESPIRATORY (INHALATION) at 14:27

## 2019-09-08 RX ADMIN — SENNOSIDES 17.2 MG: 8.6 TABLET, FILM COATED ORAL at 20:50

## 2019-09-08 RX ADMIN — ASPIRIN 81 MG: 81 TABLET, COATED ORAL at 09:34

## 2019-09-08 RX ADMIN — HYDROCODONE BITARTRATE AND ACETAMINOPHEN 1 TABLET: 5; 325 TABLET ORAL at 03:27

## 2019-09-08 RX ADMIN — CALCITRIOL 0.25 MCG: 0.25 CAPSULE ORAL at 09:34

## 2019-09-08 RX ADMIN — Medication 1 MG: at 11:46

## 2019-09-08 RX ADMIN — APIXABAN 10 MG: 5 TABLET, FILM COATED ORAL at 09:35

## 2019-09-08 RX ADMIN — APIXABAN 10 MG: 5 TABLET, FILM COATED ORAL at 20:50

## 2019-09-08 RX ADMIN — GUAIFENESIN 600 MG: 600 TABLET, EXTENDED RELEASE ORAL at 09:35

## 2019-09-08 RX ADMIN — RASAGILINE 1 MG: 1 TABLET ORAL at 09:38

## 2019-09-08 RX ADMIN — METOPROLOL TARTRATE 25 MG: 25 TABLET, FILM COATED ORAL at 20:50

## 2019-09-08 RX ADMIN — ATORVASTATIN CALCIUM 20 MG: 20 TABLET, FILM COATED ORAL at 20:50

## 2019-09-08 RX ADMIN — ROPINIROLE HYDROCHLORIDE 2 MG: 2 TABLET, FILM COATED ORAL at 09:34

## 2019-09-08 RX ADMIN — INSULIN LISPRO 1 UNITS: 100 INJECTION, SOLUTION INTRAVENOUS; SUBCUTANEOUS at 20:50

## 2019-09-08 RX ADMIN — AMIODARONE HYDROCHLORIDE 200 MG: 200 TABLET ORAL at 09:34

## 2019-09-08 RX ADMIN — INSULIN LISPRO 2 UNITS: 100 INJECTION, SOLUTION INTRAVENOUS; SUBCUTANEOUS at 12:26

## 2019-09-08 RX ADMIN — Medication 1 MG: at 00:32

## 2019-09-08 RX ADMIN — CARBIDOPA AND LEVODOPA 1 TABLET: 25; 100 TABLET, EXTENDED RELEASE ORAL at 20:49

## 2019-09-08 RX ADMIN — CYCLOBENZAPRINE HYDROCHLORIDE 5 MG: 5 TABLET, FILM COATED ORAL at 22:08

## 2019-09-08 RX ADMIN — SODIUM CHLORIDE, PRESERVATIVE FREE 10 ML: 5 INJECTION INTRAVENOUS at 20:57

## 2019-09-08 RX ADMIN — ANTI-FUNGAL POWDER MICONAZOLE NITRATE TALC FREE: 1.42 POWDER TOPICAL at 09:39

## 2019-09-08 RX ADMIN — METOPROLOL TARTRATE 25 MG: 25 TABLET, FILM COATED ORAL at 09:34

## 2019-09-08 RX ADMIN — IRON SUCROSE 200 MG: 20 INJECTION, SOLUTION INTRAVENOUS at 18:05

## 2019-09-08 RX ADMIN — IPRATROPIUM BROMIDE AND ALBUTEROL SULFATE 3 ML: .5; 3 SOLUTION RESPIRATORY (INHALATION) at 09:18

## 2019-09-08 RX ADMIN — ROPINIROLE HYDROCHLORIDE 2 MG: 2 TABLET, FILM COATED ORAL at 14:11

## 2019-09-08 RX ADMIN — Medication 5 MG: at 21:06

## 2019-09-08 ASSESSMENT — PAIN SCALES - GENERAL
PAINLEVEL_OUTOF10: 10
PAINLEVEL_OUTOF10: 10
PAINLEVEL_OUTOF10: 7
PAINLEVEL_OUTOF10: 7
PAINLEVEL_OUTOF10: 9
PAINLEVEL_OUTOF10: 7
PAINLEVEL_OUTOF10: 7

## 2019-09-08 NOTE — PROGRESS NOTES
Unit of blood started, nurse at pt bedside for first 15 mins and pt showed no signs or symptoms of transfusion reaction

## 2019-09-08 NOTE — PROGRESS NOTES
_    Today's Date: 9/8/2019  Patient Name: Rama Estrada  Date of admission: 8/25/2019 10:20 AM  Patient's age: 69 y.o., 1945  Admission Dx: Chest pain [R07.9]  Acute kidney injury ([address] [N17.9]    Reason for Consult: anemia  Requesting Physician: Juan Zee MD    CHIEF COMPLAINT:    Chief Complaint   Patient presents with    Chest Pain     SUBJECTIVE:    Patient seen and examined. Clinically stable. No events overnight. Hb  9.0---->7.5-->7.5-->7.1  Patient feels more tired. Continues to have shortness of breath on minimal exertion. No chest pain. No active bleeding. HISTORY OF PRESENT ILLNESS:    This is a 80-year-old female with medical history of CHF, chronic kidney disease, atrial fibrillation, GERD, hypertension, hyperlipidemia who was admitted on 8/25/2019 with chest pain. She denies any syncopal episode, nausea vomiting. She has chronic anemia since 2013 with hemoglobin ranging around 8-9. She is on Aranesp as per nephrology. Patient has been following with Dr. Christin Hernandez as outpatient. She was seen by cardiology. Her nuclear stress test showed reversible ischemia and so cardiac catheterization planned. Her hemoglobin here was noted to be 5.3 and she received PRBC transfusion. Most recent hemoglobin is stable at 8.7.   Past Medical History:   has a past medical history of Acute on chronic diastolic congestive heart failure ([address] Anesthesia complication, Asthma, Atrial fibrillation ([address] Cataracts, bilateral, Cellulitis, Cerebral artery occlusion with cerebral infarction ([address] CHF (congestive heart failure) ([address] Chronic kidney disease, Chronic kidney disease, Closed fracture of proximal end of right humerus with routine healing, Constipation, COPD (chronic obstructive pulmonary disease) ([address] Difficult intravenous access, Difficulty walking, Diverticulosis, DM2 (diabetes mellitus, type 2) ([address] Full dentures, GERD (gastroesophageal reflux disease), H/O fall, Headache(784.0), (GLYCOLAX) packet 17 g  17 g Oral Daily PRN Yuliana Jeff MD   17 g at 09/05/19 1459    0.9 % sodium chloride bolus  250 mL Intravenous Once Emil Antunez MD        HYDROcodone-acetaminophen (NORCO) 5-325 MG per tablet 1 tablet  1 tablet Oral Q6H PRN Emil Antunez MD   1 tablet at 09/08/19 0944    sodium chloride flush 0.9 % injection 10 mL  10 mL Intravenous 2 times per day Alis Silva MD   10 mL at 09/08/19 0934    sodium chloride flush 0.9 % injection 10 mL  10 mL Intravenous PRN Alis Silva MD        acetaminophen (TYLENOL) tablet 650 mg  650 mg Oral Q4H PRN Alis Silva MD   650 mg at 09/03/19 1541    ondansetron (ZOFRAN-ODT) disintegrating tablet 4 mg  4 mg Oral Q6H PRN Alis Silva MD        Or    ondansetron [facility]) injection 4 mg  4 mg Intravenous Q6H PRN Alis Silva MD   4 mg at 09/01/19 1940    morphine (PF) injection 1 mg  1 mg Intravenous Q8H PRN ANSHUL Real CNP   1 mg at 09/08/19 0032    metoprolol tartrate (LOPRESSOR) tablet 25 mg  25 mg Oral BID Lexie Ghotra MD   25 mg at 09/08/19 0934    miconazole (MICOTIN) 2 % powder   Topical BID Emil Antunez MD        ipratropium-albuterol (DUONEB) nebulizer solution 3 mL  3 mL Inhalation TID Emil Antunez MD   3 mL at 09/08/19 0918    darbepoetin albaro-polysorbate (ARANESP) injection 60 mcg  60 mcg Subcutaneous Weekly [place] Ahumada, APRN - CNP   60 mcg at 09/03/19 1136    calcitRIOL (ROCALTROL) capsule 0.25 mcg  0.25 mcg Oral Daily Emil Antunez MD   0.25 mcg at 09/08/19 0934    cyanocobalamin injection 1,000 mcg  1,000 mcg Intramuscular Q30 Days Emil Antunez MD   1,000 mcg at 08/27/19 6728    aspirin chewable tablet 324 mg  324 mg Oral Once Omari Banuelos MD        amiodarone (CORDARONE) tablet 200 mg  200 mg Oral Daily Emil Antunez MD   200 mg at 09/08/19 0934    atorvastatin (LIPITOR) tablet 20 mg  20 mg Oral Daily Emil Antunez MD   20 mg at 09/07/19 2151    mometasone-formoterol palpable lymphadenopathy, no hepatosplenomegaly   Chest - clear to auscultation, no wheezes, rales or rhonchi, symmetric air entry   Heart - normal rate, regular rhythm, normal S1, S2, no murmurs  Abdomen - soft, nontender, nondistended, no masses or organomegaly   Neurological - alert, oriented, normal speech, no focal findings or movement disorder noted   Musculoskeletal - no joint tenderness, deformity or swelling   Extremities - peripheral pulses normal, no pedal edema, no clubbing or cyanosis   Skin - normal coloration and turgor, no rashes, no suspicious skin lesions noted ,    DATA:    Labs:   CBC:   Recent Labs     09/07/19 0545 09/08/19 0622   WBC 8.3 11.6*   HGB 7.5* 7.1*   HCT 24.1* 23.5*   * 155     BMP:   Recent Labs     09/07/19 0545 09/08/19 0622    136   K 4.7 4.8   CO2 29 29   BUN 32* 35*   CREATININE 1.33* 1.50*   LABGLOM 39* 34*   GLUCOSE 151* 164*     PT/INR:   No results for input(s): PROTIME, INR in the last 72 hours. IMAGING DATA:      Primary Problem  <principal problem not specified>    Active Hospital Problems    Diagnosis Date Noted    Right leg swelling [M79.89]     Acute kidney injury ([address] [N17.9] 08/27/2019    Chest pain [R07.9] 08/25/2019    Chronic respiratory failure with hypoxia ([address] [J96.11] 09/23/2018         IMPRESSION:   1. Chest pain   2. Anemia  chronic anemia  3. Right femoral artery pseudoaneurysm  4. COPD  5. Atrial fibrillation  6. Chronic diastolic heart failure    RECOMMENDATIONS:  1. Pseudoaneurysm management as per vascular surgery   2. Hb is dropping further. Patient is symptomatic from her anemia. Due to her underlying lung and cardiac conditions I will give her 1 unit of blood transfusion today. 3. Iron studies showed Iron deficiency anemia. We will continue IV Iron. 4. She is on aranesp for anemia of CRI. It will be continued as outpatient. 5. Will follow   6. D/W patient and family.        Emily Guerin MD  Cell: [phone]

## 2019-09-08 NOTE — CONSULTS
complication, not stated as uncontrolled     Urethral caruncle 3/8/2018    Wears glasses     Wears glasses        Past Surgical  History:     Past Surgical History:   Procedure Laterality Date    APPENDECTOMY      CERVICAL [address] SURGERY  2012    CERVICAL SPINE SURGERY  5/31/13    posterior c5-t1    COLONOSCOPY  2009    COLONOSCOPY  12/29/2016    incomplete was not cleaned out    COLONOSCOPY  12/30/2016    COLONOSCOPY  04/25/2017     SIGMOID COLON POLYPECTOMY:  HYPERPLASTIC POLYP ,   DIVERTICULOSIS    CYSTORRHAPHY  04/04/2018    EYE SURGERY Bilateral 2017    CATARACTS W/ IOL    HARDWARE REMOVAL Right 07/05/2019    HARDWARE REMOVAL PINS  HUMERUS     HARDWARE REMOVAL Right 7/5/2019    HARDWARE REMOVAL PINS  HUMERUS  C-ARM performed by Gio Brock MD at [id number] [facility]  05/31/2013    Dr. Meryle Boll DX W/CELL WASHG [facility] N/A 6/5/2018    BRONCHOSCOPY performed by Jai Benitez MD at [facility] FLX W/REMOVAL LESION BY HOT BX FORCEPS N/A 4/25/2017    COLONOSCOPY POLYPECTOMY HOT BIOPSY performed by Belgica Nieto MD at [facility] N/A 4/4/2018    CYSTOSCOPY performed by Marge Aguirre MD at [address] Right 5/28/2019    SHOULDER CLOSED REDUCTION PERCUTANEOUS PINNING RIGHT performed by Gio Brock MD at [place] [address]  12/28/2016    gastritis, esophagitis,     UPPER GASTROINTESTINAL ENDOSCOPY N/A 8/29/2018    EGD BIOPSY performed by Belgica Nieto MD at [facility] OR       Medications:      iron sucrose  200 mg Intravenous Q24H    apixaban  10 mg Oral BID    guaiFENesin  600 mg Oral BID    cephALEXin  500 mg Oral 2 times per day    sodium chloride  250 mL Intravenous Once    sodium chloride  250 mL Intravenous Once    sodium chloride flush  10 mL Intravenous 2 times per day    metoprolol tartrate  25 mg

## 2019-09-08 NOTE — PROGRESS NOTES
GLUCOSE 132 03/07/2012    PROT 6.3 06/05/2019    LABALBU 3.2 06/05/2019    LABALBU Detected 11/30/2018    CALCIUM 7.4 09/08/2019    BILITOT 0.16 09/01/2019    ALKPHOS 67 06/05/2019    AST 17 06/05/2019    ALT <5 06/05/2019        Assessment:  Patient Active Problem List   Diagnosis    Controlled type 2 diabetes mellitus with stage 3 chronic kidney disease, without long-term current use of insulin ([facility])    Hyperlipidemia    Essential hypertension    Chronic leg pain    Paroxysmal atrial fibrillation ([facility])    Asthma    Gastroesophageal reflux disease without esophagitis    ECTOR on CPAP    Type 2 diabetes mellitus with complication, without long-term current use of insulin ([facility])    Spinal stenosis in cervical region    Hypomagnesemia    Hyperphosphaturia    Hypocalcemia    Parkinson's disease ([address]    Acute renal failure ([address]    Acute cystitis with hematuria    Altered mental status    Iron deficiency anemia due to chronic blood loss    Morbid obesity with BMI of 40.0-44.9, adult ([facility])    Hyperplastic polyp of intestine    Diverticulosis    Panlobular emphysema ([facility])    Rapid atrial fibrillation ([facility])    CKD (chronic kidney disease) stage 3, GFR 30-59 ml/min ([facility])    KRISTIN (acute kidney injury) ([address]    Anemia in chronic kidney disease    Chronic respiratory failure with hypoxia and hypercapnia ([facility])    Acute kidney injury superimposed on chronic kidney disease ([address]    CKD stage 4 due to type 2 diabetes mellitus ([address]    E. coli UTI (urinary tract infection)    Nephrolithiasis    Candidal intertrigo    Venous insufficiency    Malabsorption    Anemia of chronic renal failure, stage 4 (severe) ([facility])    Iron deficiency    Coronary atherosclerosis    COPD exacerbation ([facility])    Restless leg syndrome    SIRS (systemic inflammatory response syndrome) ([facility])    Nausea and vomiting in adult    Bacterial UTI    Odynophagia    Esophageal dysphagia    Candida esophagitis ([address]   

## 2019-09-08 NOTE — PROGRESS NOTES
°F (37.1 °C) (Oral)   Resp 20   Ht 5' (1.524 m)   Wt 186 lb 8 oz (84.6 kg)   LMP 04/03/2004 (Within Years)   SpO2 100%   BMI 36.42 kg/m²   General appearance: alert, cooperative and no distress  Mental Status: oriented to person, place and time with normal affect  Neck: good carotid pulses, no JVD  Lungs: clear to auscultation bilaterally, normal effort  Heart: regular rate and rhythm, no murmur,  Abdomen: soft, non-tender, non-distended, bowel sounds present all four quadrants, no masses, hepatomegaly, splenomegaly or aortic enlargement  Extremities: Right leg ecchymoses and edema. Palpable DP pulses bilaterally  Skin: no gross lesions, rashes, or induration      Assessment:   1. 69 yr old female with right fem  pseudoaneurysm status post thrombin injection.      Patient Active Problem List:     Controlled type 2 diabetes mellitus with stage 3 chronic kidney disease, without long-term current use of insulin (HCC)     Hyperlipidemia     Essential hypertension     Chronic leg pain     Paroxysmal atrial fibrillation (HCC)     Asthma     Gastroesophageal reflux disease without esophagitis     ECTOR on CPAP     Type 2 diabetes mellitus with complication, without long-term current use of insulin (HCC)     Spinal stenosis in cervical region     Hypomagnesemia     Hyperphosphaturia     Hypocalcemia     Parkinson's disease ([address]     Acute renal failure ([address]     Acute cystitis with hematuria     Altered mental status     Iron deficiency anemia due to chronic blood loss     Morbid obesity with BMI of 40.0-44.9, adult (HCC)     Hyperplastic polyp of intestine     Diverticulosis     Panlobular emphysema (HCC)     Rapid atrial fibrillation (HCC)     CKD (chronic kidney disease) stage 3, GFR 30-59 ml/min (HCC)     KRISTIN (acute kidney injury) ([address]     Anemia in chronic kidney disease     Chronic respiratory failure with hypoxia and hypercapnia (HCC)     Acute kidney injury superimposed on chronic kidney disease (HCC)     CKD

## 2019-09-08 NOTE — PLAN OF CARE
Problem: Falls - Risk of:  Goal: Will remain free from falls  Description  Will remain free from falls  9/8/2019 0155 by Heaven Linda RN  Outcome: Ongoing  Note:   Pt fall risk, fall band present, falling star, safety alarm activated and in use as needed. Hourly rounding performed. Pt encouraged to use call light. See [facility] fall risk assessment. 9/7/2019 1616 by Rojelio Villagran RN  Outcome: Ongoing  Goal: Absence of physical injury  Description  Absence of physical injury  9/8/2019 0155 by Heaven Linda RN  Outcome: Ongoing  Note:   Non-skid socks in place, up with assistance, bed in lowest position, bed exit & alarm as needed, provide toileting every 2 hours an d as needed. 9/7/2019 1616 by Rojelio Villagran RN  Outcome: Ongoing     Problem: Pain:  Goal: Pain level will decrease  Description  Pain level will decrease  9/8/2019 0155 by Heaven Linda RN  Outcome: Ongoing  Note:   Monitoring pain with each assessment and prn. LAVON 0-10 pain scale utilized. Non-pharmacological measures to be encouraged prior to pharmacological measures. 9/7/2019 1616 by Rojelio Villagran RN  Outcome: Ongoing  Goal: Control of acute pain  Description  Control of acute pain  9/8/2019 0155 by Heaven Linda RN  Outcome: Ongoing  9/7/2019 1616 by Rojelio Villagran RN  Outcome: Ongoing     Problem: Breathing Pattern - Ineffective:  Goal: Ability to achieve and maintain a regular respiratory rate will improve  Description  Ability to achieve and maintain a regular respiratory rate will improve  9/8/2019 0155 by Heaven Linda RN  Outcome: Ongoing  Note:   Assess for adventitious breath sounds. Monitored SaO2 > 90%. Applied 02 per nasal cannula as needed. Elevated HOB to improve breathing as needed.      9/7/2019 1616 by Rojelio Villagran RN  Outcome: Ongoing     Problem: Risk for Impaired Skin Integrity  Goal: Tissue integrity - skin and mucous membranes  Description  Structural intactness and normal

## 2019-09-08 NOTE — PROGRESS NOTES
PULMONARY PROGRESS NOTE:    Interval History: chest pain, respiratory failure, CHF COPD    Shortness of Breath: better  Cough: yes  Sputum: no  Hemoptysis: no  Chest Pain: no  Fever: no  Swelling Feet: no  Headache: no  Nausea, Emesis, Abdominal Pain: no  Diarrhea: no  Constipation: no    Events since last visit:     PAST MEDICAL HISTORY:    Smoking:     PHYSICAL EXAMINATION:  tmax 99.4  General : Awake, alert, oriented to time, place, and person  Neck - supple, no lymphadenopathy, JVD not raised  Heart - regular rhythm, S1 and S2 normal; no additional sounds heard  Lungs - Air Entry- fair bilaterally; breath sounds : coarse 100% on 2 l nc  Abdomen - soft, no tenderness  Upper Extremities  - no cyanosis, mottling; edema : absent  Lower Extremities: no cyanosis, mottling; edema : absent    Current Laboratory, Radiologic, Microbiologic, and Diagnostic studies reviewed    ASSESSMENT / PLAN:  Impression:  · Chest pain/abnormal stress test s/p cardiac cath 8/30/19 with non-obstructive CAD   · Chronic respiratory failure  · Chronic diastolic heart failure  · Atelectasis  · COPD/30-pack-year smoking history  · KRISTIN on CKD  · A.  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     Recommendations:  · 2 liters/min via nasal cannula  · Patient encouraged to use BiPAP for sleep and as needed  · Albuterol and Ipratropium Q 8 hours and prn  · Dulera 200  · Nephrology and Vascular on consult  · Watch H & H   · Hematology on consult, considering bone marrow biopsy as outpatient   · DVT prophylaxis, on Heparin drip  · Incentive spirometry every hour while awake  · dispo - going to rehab upon d/c  · Getting blood today for HGB 7.1    Plan of care discussed with Dr Keisha Wagner, APRN - CNP     REASON FOR VISIT: copd exac  I examined the patient myself  The assessment and Plan in the note per my discussion with NP    Electronically signed by Kenya Ohara Nino Gutierrez on 09/08/19

## 2019-09-08 NOTE — PROGRESS NOTES
mouth 3 times daily as needed for Muscle spasms    Historical Provider, MD   budesonide-formoterol (SYMBICORT) 80-4.5 MCG/ACT AERO Inhale 2 puffs into the lungs 2 times daily    Historical Provider, MD   ipratropium-albuterol (DUONEB) 0.5-2.5 (3) MG/3ML SOLN nebulizer solution Inhale 3 mLs into the lungs three times daily 3/20/19   Lesly Hill, DO   ONETOUCH VERIO strip 1 each by In Vitro route daily As needed. 3/4/19   Lesly Hill, DO   ONE TOUCH ULTRA TEST strip USE 1 STRIP THREE TIMES A DAY 12/26/18   Lesly Hill DO   albuterol (ACCUNEB) 1.25 MG/3ML nebulizer solution Inhale 1 ampule into the lungs every 6 hours as needed for Wheezing or Shortness of Breath    Historical Provider, MD   [facility] LANCETS 57C MISC 1 Units by Does not apply route 2 times daily 10/23/18   Lesly Hill, DO   magnesium oxide (MAG-OX) 400 (241.3 Mg) MG TABS tablet Take 1 tablet by mouth 2 times daily 9/7/18   Mattie Marcos, DO   vitamin D (CHOLECALCIFEROL) 5000 units CAPS capsule Take 5,000 Units by mouth daily    Historical Provider, MD   Oxygen Concentrator Dx: Pneumonia, use as directed. Patient taking differently: Dx: Pneumonia, use as directed.    ON 24/7 2/10/17   Lisset Hill DO       Scheduled Meds:   iron sucrose  200 mg Intravenous Q24H    apixaban  10 mg Oral BID    guaiFENesin  600 mg Oral BID    cephALEXin  500 mg Oral 2 times per day    sodium chloride  250 mL Intravenous Once    sodium chloride  250 mL Intravenous Once    sodium chloride flush  10 mL Intravenous 2 times per day    metoprolol tartrate  25 mg Oral BID    miconazole   Topical BID    ipratropium-albuterol  3 mL Inhalation TID    darbepoetin albaro-polysorbate  60 mcg Subcutaneous Weekly    calcitRIOL  0.25 mcg Oral Daily    cyanocobalamin  1,000 mcg Intramuscular Q30 Days    aspirin  324 mg Oral Once    amiodarone  200 mg Oral Daily    atorvastatin  20 mg Oral Daily    mometasone-formoterol  2 puff Inhalation BID    K 4.7 09/07/2019    K 4.6 09/06/2019    CL 96 (L) 09/08/2019    CL 99 09/07/2019    CL 98 09/06/2019    CO2 29 09/08/2019    CO2 29 09/07/2019    CO2 28 09/06/2019    BUN 35 (H) 09/08/2019    BUN 32 (H) 09/07/2019    BUN 36 (H) 09/06/2019    CREATININE 1.50 (H) 09/08/2019    CREATININE 1.33 (H) 09/07/2019    CREATININE 1.50 (H) 09/06/2019    GLUCOSE 164 (H) 09/08/2019    GLUCOSE 151 (H) 09/07/2019    GLUCOSE 112 (H) 09/06/2019     CMP:   Lab Results   Component Value Date     09/08/2019    K 4.8 09/08/2019    CL 96 09/08/2019    CO2 29 09/08/2019    BUN 35 09/08/2019    CREATININE 1.50 09/08/2019    GLUCOSE 164 09/08/2019    GLUCOSE 132 03/07/2012    CALCIUM 7.4 09/08/2019    PROT 6.3 06/05/2019    LABALBU 3.2 06/05/2019    LABALBU Detected 11/30/2018    BILITOT 0.16 09/01/2019    ALKPHOS 67 06/05/2019    AST 17 06/05/2019    ALT <5 06/05/2019      Hepatic:   Lab Results   Component Value Date    AST 17 06/05/2019    AST 17 06/04/2019    AST 14 11/28/2018    ALT <5 (L) 06/05/2019    ALT <5 (L) 06/04/2019    ALT <5 (L) 11/28/2018    BILITOT 0.16 (L) 09/01/2019    BILITOT 0.43 06/05/2019    BILITOT 0.52 06/04/2019    ALKPHOS 67 06/05/2019    ALKPHOS 75 06/04/2019    ALKPHOS 42 11/28/2018     BNP:   Lab Results   Component Value Date    BNP 40 06/07/2013     (H) 05/31/2013     Lipids:   Lab Results   Component Value Date    CHOL 135 08/26/2017    HDL 30 (L) 08/26/2017     INR:   Lab Results   Component Value Date    INR 0.9 09/01/2019    INR 1.0 08/30/2019    INR 1.0 08/25/2019     PTH: No results found for: PTH  Phosphorus:    Lab Results   Component Value Date    PHOS 5.2 09/08/2019     Ionized Calcium: No results found for: IONCA  Magnesium:   Lab Results   Component Value Date    MG 2.1 09/08/2019     Albumin:   Lab Results   Component Value Date    LABALBU 3.2 06/05/2019    LABALBU Detected 11/30/2018     Last 3 CK, CKMB, Troponin: @LABRCNT(CKTOTAL:3,CKMB:3,TROPONINI:3)       URINE:)No results found for: Rowdy Rider    Radiology:   Reviewed. Assessment:  1. Acute Kidney Injury-multifactorial       A. Bactrim       B. Vasomotor  2. CKD stage 3B/4  3. Hyperkalemia  4. HTN with recent hypotension  5. Abnormal stress test  6. Anemia of chronic disease  7. Right groin hematoma  8. Bilateral nephrolithiasis  9. Hypomagnesemia    Plan:  Serum creatinine is stable. SBP better today. Trending low 100s. Lopressor with hold parameters. Diuretics as needed  Maintain on low K diet. K controlled. Monitor phos for now. Monitor renal function daily.  hgb declining. On EMMY and venofer. Started on eliquis for 3 months. Avoid nephrotoxic drugs, fleet's enemas, NSAIDs. Adequate Iron studies. Continue aranesp. Hematology following. We will continue to follow up with you. Pt seen in collaboration with Dr. HOOKS [facility]. [address], [address] [facility]. Ph: [phone]    Patient seen and examined. Agree with above note. Above note was modified. We will continue to follow up with you. Electronically signed by De Romero MD on 9/8/2019 at 5:30 PM  [facility].   Ph: [phone]

## 2019-09-09 LAB
-: ABNORMAL
ABO/RH: NORMAL
ABSOLUTE EOS #: 0.16 K/UL (ref 0–0.44)
ABSOLUTE IMMATURE GRANULOCYTE: 0.34 K/UL (ref 0–0.3)
ABSOLUTE LYMPH #: 1.08 K/UL (ref 1.1–3.7)
ABSOLUTE MONO #: 1.19 K/UL (ref 0.1–1.2)
AMORPHOUS: ABNORMAL
ANION GAP SERPL CALCULATED.3IONS-SCNC: 12 MMOL/L (ref 9–17)
ANION GAP SERPL CALCULATED.3IONS-SCNC: 14 MMOL/L (ref 9–17)
ANTIBODY SCREEN: NEGATIVE
ARM BAND NUMBER: NORMAL
BACTERIA: ABNORMAL
BASOPHILS # BLD: 0 % (ref 0–2)
BASOPHILS ABSOLUTE: 0.03 K/UL (ref 0–0.2)
BILIRUBIN URINE: NEGATIVE
BLD PROD TYP BPU: NORMAL
BLD PROD TYP BPU: NORMAL
BUN BLDV-MCNC: 40 MG/DL (ref 8–23)
BUN BLDV-MCNC: 40 MG/DL (ref 8–23)
BUN/CREAT BLD: 23 (ref 9–20)
BUN/CREAT BLD: 25 (ref 9–20)
CALCIUM SERPL-MCNC: 7.3 MG/DL (ref 8.6–10.4)
CALCIUM SERPL-MCNC: 7.5 MG/DL (ref 8.6–10.4)
CASTS UA: ABNORMAL /LPF
CHLORIDE BLD-SCNC: 94 MMOL/L (ref 98–107)
CHLORIDE BLD-SCNC: 96 MMOL/L (ref 98–107)
CO2: 26 MMOL/L (ref 20–31)
CO2: 28 MMOL/L (ref 20–31)
COLOR: YELLOW
COMMENT UA: ABNORMAL
CREAT SERPL-MCNC: 1.61 MG/DL (ref 0.5–0.9)
CREAT SERPL-MCNC: 1.72 MG/DL (ref 0.5–0.9)
CROSSMATCH RESULT: NORMAL
CROSSMATCH RESULT: NORMAL
CRYSTALS, UA: ABNORMAL /HPF
DIFFERENTIAL TYPE: ABNORMAL
DISPENSE STATUS BLOOD BANK: NORMAL
DISPENSE STATUS BLOOD BANK: NORMAL
EOSINOPHIL,URINE: NORMAL
EOSINOPHILS RELATIVE PERCENT: 2 % (ref 1–4)
EPITHELIAL CELLS UA: ABNORMAL /HPF (ref 0–5)
EXPIRATION DATE: NORMAL
GFR AFRICAN AMERICAN: 35 ML/MIN
GFR AFRICAN AMERICAN: 38 ML/MIN
GFR NON-AFRICAN AMERICAN: 29 ML/MIN
GFR NON-AFRICAN AMERICAN: 31 ML/MIN
GFR SERPL CREATININE-BSD FRML MDRD: ABNORMAL ML/MIN/{1.73_M2}
GLUCOSE BLD-MCNC: 105 MG/DL (ref 65–105)
GLUCOSE BLD-MCNC: 119 MG/DL (ref 70–99)
GLUCOSE BLD-MCNC: 123 MG/DL (ref 65–105)
GLUCOSE BLD-MCNC: 129 MG/DL (ref 65–105)
GLUCOSE BLD-MCNC: 161 MG/DL (ref 65–105)
GLUCOSE BLD-MCNC: 183 MG/DL (ref 70–99)
GLUCOSE URINE: NEGATIVE
HCT VFR BLD CALC: 24.2 % (ref 36.3–47.1)
HEMOGLOBIN: 7.5 G/DL (ref 11.9–15.1)
IMMATURE GRANULOCYTES: 4 %
KETONES, URINE: NEGATIVE
LEUKOCYTE ESTERASE, URINE: ABNORMAL
LYMPHOCYTES # BLD: 12 % (ref 24–43)
MAGNESIUM: 1.8 MG/DL (ref 1.6–2.6)
MCH RBC QN AUTO: 30.1 PG (ref 25.2–33.5)
MCHC RBC AUTO-ENTMCNC: 31 G/DL (ref 28.4–34.8)
MCV RBC AUTO: 97.2 FL (ref 82.6–102.9)
MONOCYTES # BLD: 13 % (ref 3–12)
MUCUS: ABNORMAL
NITRITE, URINE: NEGATIVE
NRBC AUTOMATED: 0 PER 100 WBC
OTHER OBSERVATIONS UA: ABNORMAL
PARTIAL THROMBOPLASTIN TIME: 40.6 SEC (ref 23–31)
PDW BLD-RTO: 15.6 % (ref 11.8–14.4)
PH UA: 5.5 (ref 5–8)
PHOSPHORUS: 5.5 MG/DL (ref 2.6–4.5)
PLATELET # BLD: 129 K/UL (ref 138–453)
PLATELET ESTIMATE: ABNORMAL
PMV BLD AUTO: 10.2 FL (ref 8.1–13.5)
POTASSIUM SERPL-SCNC: 4.4 MMOL/L (ref 3.7–5.3)
POTASSIUM SERPL-SCNC: 4.6 MMOL/L (ref 3.7–5.3)
PROTEIN UA: ABNORMAL
RBC # BLD: 2.49 M/UL (ref 3.95–5.11)
RBC # BLD: ABNORMAL 10*6/UL
RBC UA: ABNORMAL /HPF (ref 0–2)
RENAL EPITHELIAL, UA: ABNORMAL /HPF
SEG NEUTROPHILS: 70 % (ref 36–65)
SEGMENTED NEUTROPHILS ABSOLUTE COUNT: 6.49 K/UL (ref 1.5–8.1)
SODIUM BLD-SCNC: 134 MMOL/L (ref 135–144)
SODIUM BLD-SCNC: 136 MMOL/L (ref 135–144)
SPECIFIC GRAVITY UA: 1.02 (ref 1–1.03)
TRANSFUSION STATUS: NORMAL
TRANSFUSION STATUS: NORMAL
TRICHOMONAS: ABNORMAL
TURBIDITY: ABNORMAL
UNIT DIVISION: 0
UNIT DIVISION: 0
UNIT NUMBER: NORMAL
UNIT NUMBER: NORMAL
URINE HGB: ABNORMAL
UROBILINOGEN, URINE: NORMAL
WBC # BLD: 9.3 K/UL (ref 3.5–11.3)
WBC # BLD: ABNORMAL 10*3/UL
WBC UA: ABNORMAL /HPF (ref 0–5)
YEAST: ABNORMAL

## 2019-09-09 PROCEDURE — 51798 US URINE CAPACITY MEASURE: CPT

## 2019-09-09 PROCEDURE — 85025 COMPLETE CBC W/AUTO DIFF WBC: CPT

## 2019-09-09 PROCEDURE — 94640 AIRWAY INHALATION TREATMENT: CPT

## 2019-09-09 PROCEDURE — 6370000000 HC RX 637 (ALT 250 FOR IP): Performed by: INTERNAL MEDICINE

## 2019-09-09 PROCEDURE — 97530 THERAPEUTIC ACTIVITIES: CPT

## 2019-09-09 PROCEDURE — 2700000000 HC OXYGEN THERAPY PER DAY

## 2019-09-09 PROCEDURE — 2580000003 HC RX 258: Performed by: INTERNAL MEDICINE

## 2019-09-09 PROCEDURE — 2060000000 HC ICU INTERMEDIATE R&B

## 2019-09-09 PROCEDURE — 82570 ASSAY OF URINE CREATININE: CPT

## 2019-09-09 PROCEDURE — 97110 THERAPEUTIC EXERCISES: CPT

## 2019-09-09 PROCEDURE — 94760 N-INVAS EAR/PLS OXIMETRY 1: CPT

## 2019-09-09 PROCEDURE — 85730 THROMBOPLASTIN TIME PARTIAL: CPT

## 2019-09-09 PROCEDURE — 81001 URINALYSIS AUTO W/SCOPE: CPT

## 2019-09-09 PROCEDURE — 6370000000 HC RX 637 (ALT 250 FOR IP): Performed by: SURGERY

## 2019-09-09 PROCEDURE — 82043 UR ALBUMIN QUANTITATIVE: CPT

## 2019-09-09 PROCEDURE — 99232 SBSQ HOSP IP/OBS MODERATE 35: CPT | Performed by: INTERNAL MEDICINE

## 2019-09-09 PROCEDURE — 84100 ASSAY OF PHOSPHORUS: CPT

## 2019-09-09 PROCEDURE — 82947 ASSAY GLUCOSE BLOOD QUANT: CPT

## 2019-09-09 PROCEDURE — 87205 SMEAR GRAM STAIN: CPT

## 2019-09-09 PROCEDURE — 84300 ASSAY OF URINE SODIUM: CPT

## 2019-09-09 PROCEDURE — 36415 COLL VENOUS BLD VENIPUNCTURE: CPT

## 2019-09-09 PROCEDURE — 6370000000 HC RX 637 (ALT 250 FOR IP): Performed by: NURSE PRACTITIONER

## 2019-09-09 PROCEDURE — 80048 BASIC METABOLIC PNL TOTAL CA: CPT

## 2019-09-09 PROCEDURE — 83735 ASSAY OF MAGNESIUM: CPT

## 2019-09-09 PROCEDURE — 6360000002 HC RX W HCPCS: Performed by: INTERNAL MEDICINE

## 2019-09-09 PROCEDURE — 97535 SELF CARE MNGMENT TRAINING: CPT

## 2019-09-09 RX ORDER — GUAIFENESIN 600 MG/1
600 TABLET, EXTENDED RELEASE ORAL 2 TIMES DAILY PRN
Status: DISCONTINUED | OUTPATIENT
Start: 2019-09-09 | End: 2019-09-11 | Stop reason: HOSPADM

## 2019-09-09 RX ADMIN — APIXABAN 10 MG: 5 TABLET, FILM COATED ORAL at 20:31

## 2019-09-09 RX ADMIN — HYDROCODONE BITARTRATE AND ACETAMINOPHEN 1 TABLET: 5; 325 TABLET ORAL at 09:24

## 2019-09-09 RX ADMIN — HYDROCODONE BITARTRATE AND ACETAMINOPHEN 1 TABLET: 5; 325 TABLET ORAL at 21:41

## 2019-09-09 RX ADMIN — ROPINIROLE HYDROCHLORIDE 2 MG: 2 TABLET, FILM COATED ORAL at 20:31

## 2019-09-09 RX ADMIN — CARBIDOPA AND LEVODOPA 1 TABLET: 25; 100 TABLET, EXTENDED RELEASE ORAL at 15:24

## 2019-09-09 RX ADMIN — CALCITRIOL 0.25 MCG: 0.25 CAPSULE ORAL at 09:25

## 2019-09-09 RX ADMIN — APIXABAN 10 MG: 5 TABLET, FILM COATED ORAL at 09:25

## 2019-09-09 RX ADMIN — HYDROCODONE BITARTRATE AND ACETAMINOPHEN 1 TABLET: 5; 325 TABLET ORAL at 03:08

## 2019-09-09 RX ADMIN — ANTI-FUNGAL POWDER MICONAZOLE NITRATE TALC FREE: 1.42 POWDER TOPICAL at 20:31

## 2019-09-09 RX ADMIN — SODIUM CHLORIDE, PRESERVATIVE FREE 10 ML: 5 INJECTION INTRAVENOUS at 09:25

## 2019-09-09 RX ADMIN — SENNOSIDES 17.2 MG: 8.6 TABLET, FILM COATED ORAL at 20:31

## 2019-09-09 RX ADMIN — CARBIDOPA AND LEVODOPA 1 TABLET: 25; 100 TABLET, EXTENDED RELEASE ORAL at 20:31

## 2019-09-09 RX ADMIN — SODIUM CHLORIDE, PRESERVATIVE FREE 10 ML: 5 INJECTION INTRAVENOUS at 20:32

## 2019-09-09 RX ADMIN — Medication 5 MG: at 21:41

## 2019-09-09 RX ADMIN — ANTI-FUNGAL POWDER MICONAZOLE NITRATE TALC FREE: 1.42 POWDER TOPICAL at 09:25

## 2019-09-09 RX ADMIN — HYDROCODONE BITARTRATE AND ACETAMINOPHEN 1 TABLET: 5; 325 TABLET ORAL at 15:25

## 2019-09-09 RX ADMIN — CEPHALEXIN 500 MG: 500 CAPSULE ORAL at 20:31

## 2019-09-09 RX ADMIN — IPRATROPIUM BROMIDE AND ALBUTEROL SULFATE 3 ML: .5; 3 SOLUTION RESPIRATORY (INHALATION) at 20:05

## 2019-09-09 RX ADMIN — IPRATROPIUM BROMIDE AND ALBUTEROL SULFATE 3 ML: .5; 3 SOLUTION RESPIRATORY (INHALATION) at 14:11

## 2019-09-09 RX ADMIN — ASPIRIN 81 MG: 81 TABLET, COATED ORAL at 09:24

## 2019-09-09 RX ADMIN — CARBIDOPA AND LEVODOPA 1 TABLET: 25; 100 TABLET, EXTENDED RELEASE ORAL at 09:24

## 2019-09-09 RX ADMIN — PANTOPRAZOLE SODIUM 40 MG: 40 TABLET, DELAYED RELEASE ORAL at 05:42

## 2019-09-09 RX ADMIN — CYCLOBENZAPRINE HYDROCHLORIDE 5 MG: 5 TABLET, FILM COATED ORAL at 22:44

## 2019-09-09 RX ADMIN — METOPROLOL TARTRATE 25 MG: 25 TABLET, FILM COATED ORAL at 09:24

## 2019-09-09 RX ADMIN — GUAIFENESIN 600 MG: 600 TABLET, EXTENDED RELEASE ORAL at 09:24

## 2019-09-09 RX ADMIN — ROPINIROLE HYDROCHLORIDE 2 MG: 2 TABLET, FILM COATED ORAL at 09:24

## 2019-09-09 RX ADMIN — ACETAMINOPHEN 650 MG: 325 TABLET ORAL at 04:27

## 2019-09-09 RX ADMIN — CEPHALEXIN 500 MG: 500 CAPSULE ORAL at 09:24

## 2019-09-09 RX ADMIN — IRON SUCROSE 200 MG: 20 INJECTION, SOLUTION INTRAVENOUS at 18:10

## 2019-09-09 RX ADMIN — AMIODARONE HYDROCHLORIDE 200 MG: 200 TABLET ORAL at 09:24

## 2019-09-09 RX ADMIN — PANTOPRAZOLE SODIUM 40 MG: 40 TABLET, DELAYED RELEASE ORAL at 15:24

## 2019-09-09 RX ADMIN — ROPINIROLE HYDROCHLORIDE 2 MG: 2 TABLET, FILM COATED ORAL at 15:25

## 2019-09-09 RX ADMIN — LINAGLIPTIN 2.5 MG: 5 TABLET, FILM COATED ORAL at 09:28

## 2019-09-09 RX ADMIN — METOPROLOL TARTRATE 25 MG: 25 TABLET, FILM COATED ORAL at 20:31

## 2019-09-09 RX ADMIN — ATORVASTATIN CALCIUM 20 MG: 20 TABLET, FILM COATED ORAL at 20:31

## 2019-09-09 RX ADMIN — RASAGILINE 1 MG: 1 TABLET ORAL at 09:26

## 2019-09-09 ASSESSMENT — PAIN SCALES - GENERAL
PAINLEVEL_OUTOF10: 6
PAINLEVEL_OUTOF10: 7
PAINLEVEL_OUTOF10: 8
PAINLEVEL_OUTOF10: 8

## 2019-09-09 ASSESSMENT — ENCOUNTER SYMPTOMS
RESPIRATORY NEGATIVE: 1
GASTROINTESTINAL NEGATIVE: 1

## 2019-09-09 NOTE — PROGRESS NOTES
Subjective:    Type 2 diabetes  No polyuria no polydipsia no hypoglycemia blood sugars reviewed acceptable    ROS  No fever no chills no GI  complaints no cardiopulmonary complaints no TIA no bleeding no headache no sore throat no sinus pain he has right thigh hematoma and ecchymosis and right leg swelling  physical exam  General Appearance: alert and oriented to person, place and time and in no acute distress  Skin: warm and dry, no rash or erythema  Head: normocephalic and atraumatic  Eyes: pupils equal, round, and reactive to light and sclera anicteric    Neck: neck supple and non tender without mass   Pulmonary/Chest: clear to auscultation bilaterally- no wheezes, rales or rhonchi, normal air movement, no respiratory distress  Cardiovascular: normal rate, regular rhythm, normal S1 and S2, no gallops, intact distal pulses and no carotid bruits  Abdomen: soft, non-tender, non-distended, normal bowel sounds, no masses or organomegaly  Extremities: Left leg good pulses no edema right leg ecchymosis and hematoma right thigh with right leg swelling    Neurologic: Oriented x3 with no focal deficit    BP (!) 112/41   Pulse 70   Temp 98.1 °F (36.7 °C) (Oral)   Resp 18   Ht 5' (1.524 m)   Wt 186 lb 8 oz (84.6 kg)   LMP 04/03/2004 (Within Years)   SpO2 99%   BMI 36.42 kg/m²     CBC:   Lab Results   Component Value Date    WBC 9.3 09/09/2019    RBC 2.49 09/09/2019    HGB 7.5 09/09/2019    HCT 24.2 09/09/2019    MCV 97.2 09/09/2019    MCH 30.1 09/09/2019    MCHC 31.0 09/09/2019    RDW 15.6 09/09/2019     09/09/2019    MPV 10.2 09/09/2019     CMP:    Lab Results   Component Value Date     09/09/2019    K 4.6 09/09/2019    CL 96 09/09/2019    CO2 28 09/09/2019    BUN 40 09/09/2019    CREATININE 1.61 09/09/2019    GFRAA 38 09/09/2019    LABGLOM 31 09/09/2019    GLUCOSE 183 09/09/2019    GLUCOSE 132 03/07/2012    PROT 6.3 06/05/2019    LABALBU 3.2 06/05/2019    LABALBU Detected 11/30/2018    CALCIUM 7.5 09/09/2019    BILITOT 0.16 09/01/2019    ALKPHOS 67 06/05/2019    AST 17 06/05/2019    ALT <5 06/05/2019        Assessment:  Patient Active Problem List   Diagnosis    Controlled type 2 diabetes mellitus with stage 3 chronic kidney disease, without long-term current use of insulin ([facility])    Hyperlipidemia    Essential hypertension    Chronic leg pain    Paroxysmal atrial fibrillation (HCC)    Asthma    Gastroesophageal reflux disease without esophagitis    ECTOR on CPAP    Type 2 diabetes mellitus with complication, without long-term current use of insulin ([facility])    Spinal stenosis in cervical region    Hypomagnesemia    Hyperphosphaturia    Hypocalcemia    Parkinson's disease ([address]    Acute renal failure ([address]    Acute cystitis with hematuria    Altered mental status    Iron deficiency anemia due to chronic blood loss    Morbid obesity with BMI of 40.0-44.9, adult ([facility])    Hyperplastic polyp of intestine    Diverticulosis    Panlobular emphysema ([facility])    Rapid atrial fibrillation ([facility])    CKD (chronic kidney disease) stage 3, GFR 30-59 ml/min ([facility])    KRISTIN (acute kidney injury) ([address]    Anemia in chronic kidney disease    Chronic respiratory failure with hypoxia and hypercapnia ([facility])    Acute kidney injury superimposed on chronic kidney disease ([address]    CKD stage 4 due to type 2 diabetes mellitus ([address]    E. coli UTI (urinary tract infection)    Nephrolithiasis    Candidal intertrigo    Venous insufficiency    Malabsorption    Anemia of chronic renal failure, stage 4 (severe) ([facility])    Iron deficiency    Coronary atherosclerosis    COPD exacerbation ([facility])    Restless leg syndrome    SIRS (systemic inflammatory response syndrome) ([facility])    Nausea and vomiting in adult    Bacterial UTI    Odynophagia    Esophageal dysphagia    Candida esophagitis ([facility])    Sepsis due to urinary tract infection ([facility])    Chronic respiratory failure with hypoxia ([facility])    Chronic diastolic

## 2019-09-09 NOTE — CARE COORDINATION
Social work: Call received from [facility], states they can accept pt after 9/11 following last dose of Venofer. SW initiated precert process by faxing pt/ot evals to [facility] [phone].

## 2019-09-09 NOTE — PROGRESS NOTES
5 mg by mouth 3 times daily as needed for Muscle spasms    Historical Provider, MD   budesonide-formoterol (SYMBICORT) 80-4.5 MCG/ACT AERO Inhale 2 puffs into the lungs 2 times daily    Historical Provider, MD   ipratropium-albuterol (DUONEB) 0.5-2.5 (3) MG/3ML SOLN nebulizer solution Inhale 3 mLs into the lungs three times daily 3/20/19   Lesly Hill, DO   ONETOUCH VERIO strip 1 each by In Vitro route daily As needed. 3/4/19   eLsly Hill, DO   ONE TOUCH ULTRA TEST strip USE 1 STRIP THREE TIMES A DAY 12/26/18   Lesly Hill DO   albuterol (ACCUNEB) 1.25 MG/3ML nebulizer solution Inhale 1 ampule into the lungs every 6 hours as needed for Wheezing or Shortness of Breath    Historical Provider, MD Wesley Chen LANCETS 56A MISC 1 Units by Does not apply route 2 times daily 10/23/18   Lesly Hill, DO   magnesium oxide (MAG-OX) 400 (241.3 Mg) MG TABS tablet Take 1 tablet by mouth 2 times daily 9/7/18   Ana Marcos, DO   vitamin D (CHOLECALCIFEROL) 5000 units CAPS capsule Take 5,000 Units by mouth daily    Historical Provider, MD   Oxygen Concentrator Dx: Pneumonia, use as directed. Patient taking differently: Dx: Pneumonia, use as directed.    ON 24/7 2/10/17   Crys Hill DO       Scheduled Meds:   iron sucrose  200 mg Intravenous Q24H    apixaban  10 mg Oral BID    guaiFENesin  600 mg Oral BID    cephALEXin  500 mg Oral 2 times per day    sodium chloride  250 mL Intravenous Once    sodium chloride  250 mL Intravenous Once    sodium chloride flush  10 mL Intravenous 2 times per day    metoprolol tartrate  25 mg Oral BID    miconazole   Topical BID    ipratropium-albuterol  3 mL Inhalation TID    darbepoetin albaro-polysorbate  60 mcg Subcutaneous Weekly    calcitRIOL  0.25 mcg Oral Daily    cyanocobalamin  1,000 mcg Intramuscular Q30 Days    aspirin  324 mg Oral Once    amiodarone  200 mg Oral Daily    atorvastatin  20 mg Oral Daily    mometasone-formoterol  2 puff Inhalation BID    4.7 09/07/2019    CL 94 (L) 09/09/2019    CL 96 (L) 09/08/2019    CL 99 09/07/2019    CO2 26 09/09/2019    CO2 29 09/08/2019    CO2 29 09/07/2019    BUN 40 (H) 09/09/2019    BUN 35 (H) 09/08/2019    BUN 32 (H) 09/07/2019    CREATININE 1.72 (H) 09/09/2019    CREATININE 1.50 (H) 09/08/2019    CREATININE 1.33 (H) 09/07/2019    GLUCOSE 119 (H) 09/09/2019    GLUCOSE 164 (H) 09/08/2019    GLUCOSE 151 (H) 09/07/2019     CMP:   Lab Results   Component Value Date     09/09/2019    K 4.4 09/09/2019    CL 94 09/09/2019    CO2 26 09/09/2019    BUN 40 09/09/2019    CREATININE 1.72 09/09/2019    GLUCOSE 119 09/09/2019    GLUCOSE 132 03/07/2012    CALCIUM 7.3 09/09/2019    PROT 6.3 06/05/2019    LABALBU 3.2 06/05/2019    LABALBU Detected 11/30/2018    BILITOT 0.16 09/01/2019    ALKPHOS 67 06/05/2019    AST 17 06/05/2019    ALT <5 06/05/2019      Hepatic:   Lab Results   Component Value Date    AST 17 06/05/2019    AST 17 06/04/2019    AST 14 11/28/2018    ALT <5 (L) 06/05/2019    ALT <5 (L) 06/04/2019    ALT <5 (L) 11/28/2018    BILITOT 0.16 (L) 09/01/2019    BILITOT 0.43 06/05/2019    BILITOT 0.52 06/04/2019    ALKPHOS 67 06/05/2019    ALKPHOS 75 06/04/2019    ALKPHOS 42 11/28/2018     BNP:   Lab Results   Component Value Date    BNP 40 06/07/2013     (H) 05/31/2013     Lipids:   Lab Results   Component Value Date    CHOL 135 08/26/2017    HDL 30 (L) 08/26/2017     INR:   Lab Results   Component Value Date    INR 0.9 09/01/2019    INR 1.0 08/30/2019    INR 1.0 08/25/2019     PTH: No results found for: PTH  Phosphorus:    Lab Results   Component Value Date    PHOS 5.5 09/09/2019     Ionized Calcium: No results found for: IONCA  Magnesium:   Lab Results   Component Value Date    MG 1.8 09/09/2019     Albumin:   Lab Results   Component Value Date    LABALBU 3.2 06/05/2019    LABALBU Detected 11/30/2018     Last 3 CK, CKMB, Troponin: @LABRCNT(CKTOTAL:3,CKMB:3,TROPONINI:3)       URINE:)No results found for: KRISTIAN,

## 2019-09-09 NOTE — PROGRESS NOTES
Present: No  Subjective  Subjective: Patient states that her R leg hurts when she stands up. Reports at end of session \"Well, now I am tired\"  Pain Screening  Patient Currently in Pain: Yes  Vital Signs  Patient Currently in Pain: Yes       Orientation  Orientation  Overall Orientation Status: Within Functional Limits     Objective   Bed mobility  Comment: Patient sitting up in green recliner chair at start of session  Transfers  Sit to Stand: Minimal Assistance; Moderate Assistance;2 Person Assistance(With use of juancho stedy)  Stand to sit: Minimal Assistance; Moderate Assistance;2 Person Assistance(With use of juancho stedy)  Comment: Patient performed standing weight shift and marching in [facility] with min A; patient was then able to perform sit to stand and stand to sit transfers times two with use of rolling walker requiring increased assist. Patient was able to march in place with B UE support on RW and assist of therapists  Ambulation  Ambulation?: No     Balance  Posture: Fair  Sitting - Static: Fair  Sitting - Dynamic: Fair  Standing - Static: Fair  Exercises  Comments: Supine heel slides, hip abd/add, and ankle pumps in recliner chair x 10 reps to promote increased tolerance to standing activities         AM-PAC Score  AM-PAC Inpatient Mobility Raw Score : 9 (09/09/19 1138)  AM-PAC Inpatient T-Scale Score : 30.55 (09/09/19 1138)  Mobility Inpatient CMS 0-100% Score: 81.38 (09/09/19 1138)  Mobility Inpatient CMS G-Code Modifier : CM (09/09/19 1138)        Goals  Short term goals  Time Frame for Short term goals: 12 visits  Short term goal 1: Pt to complete bed mobility and transfers with CGA-min assist or better to increase pt independence and aid in return to PLOF. Short term goal 2: Pt to ambulate 150 ft with R/walker and SBA assist or better to return pt to PLOF.    Short term goal 3: Pt to go up and down 2 steps with supervision to aid in return home following D/C  Short term goal 4: Pt to increase strength to 4+/5 overall to increase pt stability and decrease risk of falls. Short term goal 5: educate pt on bed mobility, transfers, balance, gait, saftey, and exercise and issue written HEP.      Plan    Plan  Times per week: 1-2x/D, 5-6D/wk  Current Treatment Recommendations: Strengthening, Gait Training, Stair training, Balance Training, Endurance Training, Functional Mobility Training, Home Exercise Program, Transfer Training, Safety Education & Training, Positioning  Safety Devices  Type of devices: Left in chair, Call light within reach, Gait belt, All fall risk precautions in place, Nurse notified     Therapy Time   Individual Concurrent Group Co-treatment   Time In Bem Rkp. 97.         Time Out 1015         Min[place], [place]

## 2019-09-09 NOTE — PLAN OF CARE
Problem: Falls - Risk of:  Goal: Will remain free from falls  Description  Will remain free from falls  Outcome: Ongoing  Note:   Fall risk assessment completed. Patient instructed to use call light. Patient asleep in chair this shift, chair locked, feet elevated, call light and bedside table within reach, clutter removed, and non-skid footwear on when pt out of bed. Hourly rounds will continue. Problem: Breathing Pattern - Ineffective:  Goal: Ability to achieve and maintain a regular respiratory rate will improve  Description  Ability to achieve and maintain a regular respiratory rate will improve  Note:   O2 sats WDL, patient is on 2L N[place], no complaints of shortness of breath or distress. Continue to monitor. Problem: Pain:  Goal: Pain level will decrease  Description  Pain level will decrease  Outcome: Ongoing  Note:   Pain assessment completed. Patient demonstrates understanding of pain rating scale and interventions. Offering pain medications at appropriate times. At this time patient states pain is adequately controlled. Will continue to monitor and reassess with each rounding and PRN.

## 2019-09-09 NOTE — PROGRESS NOTES
Pulmonary Critical Care Progress Note  Chad Tsai CNP / Dr. Sanju Slade M.D. Patient seen for the follow up of respiratory failure, CHF, ECTOR    Subjective:  She is sitting up in a chair, no distress. She has complaints of right leg discomfort/swelling unchanged from yesterday. She denies shortness of breath, cough, chest pain. No acute events noted overnight. Length of stay: 13 Days    Examination:  Vitals: BP (!) 114/41   Pulse 79   Temp 98.2 °F (36.8 °C) (Oral)   Resp 22   Ht 5' (1.524 m)   Wt 186 lb 8 oz (84.6 kg)   LMP 04/03/2004 (Within Years)   SpO2 100%   BMI 36.42 kg/m²     General appearance: alert and cooperative with exam, up in chair no distress  Neck: No JVD  Lungs: Moderate air exchange, no rales, no wheeze, no rhonchi  Heart: regular rate and rhythm, S1, S2 normal, no gallop  Abdomen: Soft, non tender, + BS  Extremities: no cyanosis or clubbing. + Right leg swelling/large hematoma    LABs:  CBC:   Recent Labs     09/08/19  0622 09/09/19  0620   WBC 11.6* 9.3   HGB 7.1* 7.5*   HCT 23.5* 24.2*    129*     BMP:   Recent Labs     09/08/19  0622 09/09/19  0620    134*   K 4.8 4.4   CO2 29 26   BUN 35* 40*   CREATININE 1.50* 1.72*   LABGLOM 34* 29*   GLUCOSE 164* 119*     APTT:  Recent Labs     09/08/19  0622 09/09/19  0620   APTT 33.7* 40.6*     Impression:  · Chest pain/abnormal stress test s/p cardiac cath 8/30/19 with non-obstructive CAD   · Chronic respiratory failure  · Chronic diastolic heart failure  · Atelectasis  · COPD/30-pack-year smoking history  · KRISTIN on CKD  · A.  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    Recommendations:  · 2 liters/min via nasal cannula  · Patient encouraged to use BiPAP for sleep and as needed  · Albuterol and Ipratropium Q 8 hours and prn  · Dulera 200  · Nephrology and Vascular on consult  · Await results of repeat arterial duplex study  · Vascular following  · Hematology on consult, considering bone marrow biopsy as outpatient   · DVT prophylaxis, on Heparin drip  · Incentive spirometry every hour while awake  · Discharge planning, awaiting pre-CERT  · Will follow with you    Electronically signed by     ANSHUL Murillo CNP on 9/9/2019 at 11:24 AM  Patient was seen under the supervision of Dr. Valladares Notice and Sleep Medicine,    [facility] [place]: [phone]

## 2019-09-09 NOTE — PROGRESS NOTES
Pulmonary/Chest: Effort normal and breath sounds normal.   Abdominal: Soft. Bowel sounds are normal.   Musculoskeletal: She exhibits edema (Right thigh leg and foot). Right thigh soft tissue swelling and ecchymotic changes all the way to the groin. Neurological: She is alert. Skin:   Mild ecchymotic skin changes in the right groin and thigh       Laboratory data:   I have independently reviewed the followinglabs:  CBC with Differential:   Recent Labs     09/08/19 0622 09/09/19 0620   WBC 11.6* 9.3   HGB 7.1* 7.5*   HCT 23.5* 24.2*    129*   LYMPHOPCT 7* 12*   MONOPCT 10 13*     BMP:   Recent Labs     09/08/19 0622 09/09/19 0620    134*   K 4.8 4.4   CL 96* 94*   CO2 29 26   BUN 35* 40*   CREATININE 1.50* 1.72*   MG 2.1 1.8     Hepatic Function Panel: No results for input(s): PROT, LABALBU, BILIDIR, IBILI, BILITOT, ALKPHOS, ALT, AST in the last 72 hours. No results for input(s): VANCOTROUGH in the last 72 hours. Lab Results   Component Value Date    CRP 3.1 06/12/2013     Lab Results   Component Value Date    SEDRATE 79 (H) 08/24/2018       No results for input(s): PROCAL in the last 72 hours. Imaging Studies:   No new imaging    Cultures:     Urine culture clean catch [[id number]] (Abnormal)  Collected: 09/04/19 1945   Order Status: Completed Specimen: Urine, clean catch Updated: 09/06/19 1109    Specimen Description . CLEAN CATCH URINE    Special Requests NOT REPORTED    Culture ESCHERICHIA COLI 10 to 50,000 CFU/MLAbnormal    Escherichia coli (1)     Antibiotic Interpretation ERIN Status    amikacin   Final     NOT REPORTED   ampicillin Resistant  Final     >=32  RESISTANT   ampicillin-sulbactam   Final     NOT REPORTED   aztreonam Sensitive  Final     <=1  SUSCEPTIBLE   ceFAZolin Sensitive  Final     <=4  SUSCEPTIBLE   ceFAZolin Sensitive Cefazolin sensitivity results can be used to predict the effectiveness of oral cephalosporins (eg.  Cephalexin) in uncomplicated Urinary Tract

## 2019-09-09 NOTE — PROGRESS NOTES
Vascular Surgery   Progress Note    9/9/2019 2:49 PM  Subjective:   Admit Date: 8/25/2019  PCP: Nimisha Elizalde MD     Interval History: No complaints. Still awaiting precertification. Diet: DIET CARDIAC; No Added Salt (3-4 GM);  Daily Fluid Restriction: 1000 ml; Low Potassium, Low Phosphorus    Medications:   Scheduled Meds:   iron sucrose  200 mg Intravenous Q24H    apixaban  10 mg Oral BID    cephALEXin  500 mg Oral 2 times per day    sodium chloride  250 mL Intravenous Once    sodium chloride  250 mL Intravenous Once    sodium chloride flush  10 mL Intravenous 2 times per day    metoprolol tartrate  25 mg Oral BID    miconazole   Topical BID    ipratropium-albuterol  3 mL Inhalation TID    darbepoetin albaro-polysorbate  60 mcg Subcutaneous Weekly    calcitRIOL  0.25 mcg Oral Daily    cyanocobalamin  1,000 mcg Intramuscular Q30 Days    aspirin  324 mg Oral Once    amiodarone  200 mg Oral Daily    atorvastatin  20 mg Oral Daily    mometasone-formoterol  2 puff Inhalation BID    carbidopa-levodopa  1 tablet Oral TID    linagliptin  2.5 mg Oral Daily    senna  2 tablet Oral Nightly    rOPINIRole  2 mg Oral TID    insulin lispro  0-6 Units Subcutaneous TID WC    insulin lispro  0-3 Units Subcutaneous Nightly    aspirin  81 mg Oral Daily    pantoprazole  40 mg Oral BID AC    rasagiline mesylate  1 mg Oral Daily     Continuous Infusions:   heparin (porcine) Stopped (09/07/19 1830)    dextrose           Labs:   CBC:   Recent Labs     09/07/19  0545 09/08/19  0622 09/09/19  0620   WBC 8.3 11.6* 9.3   HGB 7.5* 7.1* 7.5*   * 155 129*     BMP:    Recent Labs     09/07/19  0545 09/08/19  0622 09/09/19  0620    136 134*   K 4.7 4.8 4.4   CL 99 96* 94*   CO2 29 29 26   BUN 32* 35* 40*   CREATININE 1.33* 1.50* 1.72*   GLUCOSE 151* 164* 119*         Objective:   Vitals: BP (!) 114/41   Pulse 79   Temp 98.2 °F (36.8 °C) (Oral)   Resp 22   Ht 5' (1.524 m)   Wt 186 lb 8 oz (84.6 kg)

## 2019-09-09 NOTE — PROGRESS NOTES
Oral Daily    cyanocobalamin  1,000 mcg Intramuscular Q30 Days    aspirin  324 mg Oral Once    amiodarone  200 mg Oral Daily    atorvastatin  20 mg Oral Daily    mometasone-formoterol  2 puff Inhalation BID    carbidopa-levodopa  1 tablet Oral TID    linagliptin  2.5 mg Oral Daily    senna  2 tablet Oral Nightly    rOPINIRole  2 mg Oral TID    insulin lispro  0-6 Units Subcutaneous TID WC    insulin lispro  0-3 Units Subcutaneous Nightly    aspirin  81 mg Oral Daily    pantoprazole  40 mg Oral BID AC    rasagiline mesylate  1 mg Oral Daily     Continuous Infusions:   heparin (porcine) Stopped (09/07/19 1830)    dextrose         Assessment/ Plan :   Right groin pseudoaneurysm - thrombosed  Non-obstructive CAD. Paroxysmal atrial fibrillation. Hypertension. Acute on chronic anemia. Acute on chronic renal failure -resolved. Supportive care. Will follow. Thank you very much for allowing us to participate in the care of this patient. Please call us with any questions.     Electronically signed by Alan Dance, MD on 9/9/2019 at 2:28 PM

## 2019-09-09 NOTE — PROGRESS NOTES
disease), H/O fall, Headache(784.0), [place] (hard of hearing), Hyperlipidemia, Hyperplastic polyp of intestine, Hypertension, Impaired ambulation, Kidney stone, Living in nursing home, Morbid obesity with BMI of 40.0-44.9, adult ([facility] [address] Muscle weakness, Open wound, ECTOR on CPAP, Osteoarthritis, Parkinson disease ([facility] [address] Restless leg syndrome, Spinal stenosis in cervical region, Type II or unspecified type diabetes mellitus without mention of complication, not stated as uncontrolled, Urethral caruncle, Wears glasses, and Wears glasses. Past Surgical History:   has a past surgical history that includes Cervical disc surgery (2012); Appendectomy; Tonsillectomy and adenoidectomy (1953); hernia repair (1999); eye surgery (Bilateral, 2017); Cervical spine surgery (5/31/13); laminectomy (05/31/2013); Upper gastrointestinal endoscopy (12/28/2016); Colonoscopy (2009); Colonoscopy (12/29/2016); Colonoscopy (12/30/2016); Colonoscopy (04/25/2017); pr colsc flx w/removal lesion by hot bx forceps (N/A, 4/25/2017); Cystorrhaphy (04/04/2018); pr cystourethroscopy,biopsies (N/A, 4/4/2018); pr [facility] incl fluor gdnce dx w/cell washg spx (N/A, 6/5/2018); Upper gastrointestinal endoscopy (N/A, 8/29/2018); shoulder fracture surgery (Right, 5/28/2019); Hardware Removal (Right, 07/05/2019); and Hardware Removal (Right, 7/5/2019). Medications:    Prior to Admission medications    Medication Sig Start Date End Date Taking?  Authorizing Provider   darbepoetin albaro-polysorbate (ARANESP) 200 MCG/0.4ML SOSY injection Inject 200 mcg into the skin every 28 days   Yes Historical Provider, MD   cyanocobalamin 1000 MCG/ML injection Inject 1,000 mcg into the muscle every 30 days   Yes Historical Provider, MD   sulfamethoxazole-trimethoprim (BACTRIM DS) 800-160 MG per tablet Take 1 tablet by mouth 2 times daily   Yes Historical Provider, MD   melatonin 5 MG TABS tablet Take 5 mg by mouth nightly as needed (sleep)   Yes Historical Provider, MD Gopi Bass MD   2 puff at 09/03/19 0931    carbidopa-levodopa (SINEMET CR)  MG per extended release tablet 1 tablet  1 tablet Oral TID Gopi Bass MD   1 tablet at 09/08/19 2049    linagliptin (TRADJENTA) tablet 2.5 mg  2.5 mg Oral Daily Gopi Bass MD   2.5 mg at 09/08/19 0935    senna (SENOKOT) tablet 17.2 mg  2 tablet Oral Nightly Gopi Bass MD   17.2 mg at 09/08/19 2050    rOPINIRole (REQUIP) tablet 2 mg  2 mg Oral TID Gpoi Bass MD   2 mg at 09/08/19 2050    glucose (GLUTOSE) 40 % oral gel 15 g  15 g Oral PRN Gopi Bass MD        dextrose 50 % IV solution  12.5 g Intravenous SCOOBY Bass MD        glucagon (rDNA) injection 1 mg  1 mg Intramuscular PRROSI Bass MD        dextrose 5 % solution  100 mL/hr Intravenous SCOOBY Bass MD        insulin lispro (HUMALOG) injection vial 0-6 Units  0-6 Units Subcutaneous TID WC Gopi Bass MD   2 Units at 09/08/19 1226    insulin lispro (HUMALOG) injection vial 0-3 Units  0-3 Units Subcutaneous Nightly Gopi Bass MD   1 Units at 09/08/19 2050    albuterol (PROVENTIL) nebulizer solution 2.5 mg  2.5 mg Nebulization As Directed RT SCOOBY Bass MD        Melatonin CHEW 5 mg  5 mg Oral Nightly SCOOBY Bass MD   5 mg at 09/08/19 2106    aspirin EC tablet 81 mg  81 mg Oral Daily Gopi Bass MD   81 mg at 09/08/19 0934    pantoprazole (PROTONIX) tablet 40 mg  40 mg Oral BID AC Goip Bass MD   40 mg at 09/09/19 0554    rasagiline mesylate TABS 1 mg  1 mg Oral Daily Gopi Bass MD   1 mg at 09/08/19 8838       Allergies:  Dye [barium-containing compounds]; Pcn [penicillins]; and Bactrim [sulfamethoxazole-trimethoprim]    Social History:   reports that she quit smoking about 48 years ago. Her smoking use included cigarettes. She has a 30.00 pack-year smoking history.  She has never used smokeless tobacco. She reports that she drinks about 2.0 standard drinks of

## 2019-09-10 LAB
ABSOLUTE EOS #: 0.13 K/UL (ref 0–0.44)
ABSOLUTE IMMATURE GRANULOCYTE: 0.5 K/UL (ref 0–0.3)
ABSOLUTE LYMPH #: 0.99 K/UL (ref 1.1–3.7)
ABSOLUTE MONO #: 1.21 K/UL (ref 0.1–1.2)
ANION GAP SERPL CALCULATED.3IONS-SCNC: 14 MMOL/L (ref 9–17)
BASOPHILS # BLD: 0 % (ref 0–2)
BASOPHILS ABSOLUTE: 0.03 K/UL (ref 0–0.2)
BUN BLDV-MCNC: 44 MG/DL (ref 8–23)
BUN/CREAT BLD: 25 (ref 9–20)
CALCIUM SERPL-MCNC: 7.3 MG/DL (ref 8.6–10.4)
CHLORIDE BLD-SCNC: 96 MMOL/L (ref 98–107)
CO2: 26 MMOL/L (ref 20–31)
CREAT SERPL-MCNC: 1.74 MG/DL (ref 0.5–0.9)
CREATININE URINE: 95.9 MG/DL (ref 28–217)
DIFFERENTIAL TYPE: ABNORMAL
EOSINOPHILS RELATIVE PERCENT: 1 % (ref 1–4)
GFR AFRICAN AMERICAN: 35 ML/MIN
GFR NON-AFRICAN AMERICAN: 29 ML/MIN
GFR SERPL CREATININE-BSD FRML MDRD: ABNORMAL ML/MIN/{1.73_M2}
GFR SERPL CREATININE-BSD FRML MDRD: ABNORMAL ML/MIN/{1.73_M2}
GLUCOSE BLD-MCNC: 113 MG/DL (ref 65–105)
GLUCOSE BLD-MCNC: 128 MG/DL (ref 70–99)
GLUCOSE BLD-MCNC: 130 MG/DL (ref 65–105)
GLUCOSE BLD-MCNC: 140 MG/DL (ref 65–105)
HCT VFR BLD CALC: 23.7 % (ref 36.3–47.1)
HEMOGLOBIN: 7.1 G/DL (ref 11.9–15.1)
IMMATURE GRANULOCYTES: 5 %
LYMPHOCYTES # BLD: 10 % (ref 24–43)
MAGNESIUM: 1.7 MG/DL (ref 1.6–2.6)
MCH RBC QN AUTO: 29.8 PG (ref 25.2–33.5)
MCHC RBC AUTO-ENTMCNC: 30 G/DL (ref 28.4–34.8)
MCV RBC AUTO: 99.6 FL (ref 82.6–102.9)
MICROALBUMIN/CREAT 24H UR: 89 MG/L
MICROALBUMIN/CREAT UR-RTO: 93 MCG/MG CREAT
MONOCYTES # BLD: 12 % (ref 3–12)
NRBC AUTOMATED: 0 PER 100 WBC
PARTIAL THROMBOPLASTIN TIME: 37.3 SEC (ref 23–31)
PDW BLD-RTO: 15.7 % (ref 11.8–14.4)
PHOSPHORUS: 5.5 MG/DL (ref 2.6–4.5)
PLATELET # BLD: 146 K/UL (ref 138–453)
PLATELET ESTIMATE: ABNORMAL
PMV BLD AUTO: 10.7 FL (ref 8.1–13.5)
POTASSIUM SERPL-SCNC: 4.7 MMOL/L (ref 3.7–5.3)
RBC # BLD: 2.38 M/UL (ref 3.95–5.11)
RBC # BLD: ABNORMAL 10*6/UL
SEG NEUTROPHILS: 72 % (ref 36–65)
SEGMENTED NEUTROPHILS ABSOLUTE COUNT: 7.4 K/UL (ref 1.5–8.1)
SODIUM BLD-SCNC: 136 MMOL/L (ref 135–144)
SODIUM,UR: 42 MMOL/L
WBC # BLD: 10.3 K/UL (ref 3.5–11.3)
WBC # BLD: ABNORMAL 10*3/UL

## 2019-09-10 PROCEDURE — 84100 ASSAY OF PHOSPHORUS: CPT

## 2019-09-10 PROCEDURE — 6360000002 HC RX W HCPCS: Performed by: NURSE PRACTITIONER

## 2019-09-10 PROCEDURE — 80048 BASIC METABOLIC PNL TOTAL CA: CPT

## 2019-09-10 PROCEDURE — 6370000000 HC RX 637 (ALT 250 FOR IP): Performed by: INTERNAL MEDICINE

## 2019-09-10 PROCEDURE — 94640 AIRWAY INHALATION TREATMENT: CPT

## 2019-09-10 PROCEDURE — 6360000002 HC RX W HCPCS: Performed by: INTERNAL MEDICINE

## 2019-09-10 PROCEDURE — 97116 GAIT TRAINING THERAPY: CPT

## 2019-09-10 PROCEDURE — 82947 ASSAY GLUCOSE BLOOD QUANT: CPT

## 2019-09-10 PROCEDURE — 99232 SBSQ HOSP IP/OBS MODERATE 35: CPT | Performed by: INTERNAL MEDICINE

## 2019-09-10 PROCEDURE — 2580000003 HC RX 258: Performed by: INTERNAL MEDICINE

## 2019-09-10 PROCEDURE — 85025 COMPLETE CBC W/AUTO DIFF WBC: CPT

## 2019-09-10 PROCEDURE — 97530 THERAPEUTIC ACTIVITIES: CPT

## 2019-09-10 PROCEDURE — 36415 COLL VENOUS BLD VENIPUNCTURE: CPT

## 2019-09-10 PROCEDURE — 83735 ASSAY OF MAGNESIUM: CPT

## 2019-09-10 PROCEDURE — 85730 THROMBOPLASTIN TIME PARTIAL: CPT

## 2019-09-10 PROCEDURE — 94760 N-INVAS EAR/PLS OXIMETRY 1: CPT

## 2019-09-10 PROCEDURE — 6370000000 HC RX 637 (ALT 250 FOR IP): Performed by: NURSE PRACTITIONER

## 2019-09-10 PROCEDURE — 97535 SELF CARE MNGMENT TRAINING: CPT

## 2019-09-10 PROCEDURE — 2060000000 HC ICU INTERMEDIATE R&B

## 2019-09-10 PROCEDURE — 6370000000 HC RX 637 (ALT 250 FOR IP): Performed by: SURGERY

## 2019-09-10 RX ORDER — HYDROCODONE BITARTRATE AND ACETAMINOPHEN 5; 325 MG/1; MG/1
1 TABLET ORAL EVERY 4 HOURS PRN
Status: DISCONTINUED | OUTPATIENT
Start: 2019-09-10 | End: 2019-09-11 | Stop reason: HOSPADM

## 2019-09-10 RX ORDER — SODIUM CHLORIDE 9 MG/ML
INJECTION, SOLUTION INTRAVENOUS CONTINUOUS
Status: ACTIVE | OUTPATIENT
Start: 2019-09-10 | End: 2019-09-10

## 2019-09-10 RX ADMIN — ROPINIROLE HYDROCHLORIDE 2 MG: 2 TABLET, FILM COATED ORAL at 09:15

## 2019-09-10 RX ADMIN — CALCIUM ACETATE 667 MG: 667 CAPSULE ORAL at 13:04

## 2019-09-10 RX ADMIN — IRON SUCROSE 200 MG: 20 INJECTION, SOLUTION INTRAVENOUS at 21:07

## 2019-09-10 RX ADMIN — ACETAMINOPHEN 650 MG: 325 TABLET ORAL at 01:28

## 2019-09-10 RX ADMIN — SENNOSIDES 17.2 MG: 8.6 TABLET, FILM COATED ORAL at 21:13

## 2019-09-10 RX ADMIN — ROPINIROLE HYDROCHLORIDE 2 MG: 2 TABLET, FILM COATED ORAL at 21:13

## 2019-09-10 RX ADMIN — CARBIDOPA AND LEVODOPA 1 TABLET: 25; 100 TABLET, EXTENDED RELEASE ORAL at 21:13

## 2019-09-10 RX ADMIN — PANTOPRAZOLE SODIUM 40 MG: 40 TABLET, DELAYED RELEASE ORAL at 06:22

## 2019-09-10 RX ADMIN — APIXABAN 10 MG: 5 TABLET, FILM COATED ORAL at 09:16

## 2019-09-10 RX ADMIN — LINAGLIPTIN 2.5 MG: 5 TABLET, FILM COATED ORAL at 09:16

## 2019-09-10 RX ADMIN — INSULIN LISPRO 1 UNITS: 100 INJECTION, SOLUTION INTRAVENOUS; SUBCUTANEOUS at 21:17

## 2019-09-10 RX ADMIN — METOPROLOL TARTRATE 25 MG: 25 TABLET, FILM COATED ORAL at 21:13

## 2019-09-10 RX ADMIN — CARBIDOPA AND LEVODOPA 1 TABLET: 25; 100 TABLET, EXTENDED RELEASE ORAL at 14:28

## 2019-09-10 RX ADMIN — ROPINIROLE HYDROCHLORIDE 2 MG: 2 TABLET, FILM COATED ORAL at 14:28

## 2019-09-10 RX ADMIN — HYDROCODONE BITARTRATE AND ACETAMINOPHEN 1 TABLET: 5; 325 TABLET ORAL at 00:43

## 2019-09-10 RX ADMIN — CEPHALEXIN 500 MG: 500 CAPSULE ORAL at 09:15

## 2019-09-10 RX ADMIN — RASAGILINE 1 MG: 1 TABLET ORAL at 09:29

## 2019-09-10 RX ADMIN — PANTOPRAZOLE SODIUM 40 MG: 40 TABLET, DELAYED RELEASE ORAL at 18:26

## 2019-09-10 RX ADMIN — ANTI-FUNGAL POWDER MICONAZOLE NITRATE TALC FREE: 1.42 POWDER TOPICAL at 22:12

## 2019-09-10 RX ADMIN — ATORVASTATIN CALCIUM 20 MG: 20 TABLET, FILM COATED ORAL at 21:13

## 2019-09-10 RX ADMIN — CARBIDOPA AND LEVODOPA 1 TABLET: 25; 100 TABLET, EXTENDED RELEASE ORAL at 09:16

## 2019-09-10 RX ADMIN — SODIUM CHLORIDE: 9 INJECTION, SOLUTION INTRAVENOUS at 14:32

## 2019-09-10 RX ADMIN — DARBEPOETIN ALFA 60 MCG: 60 INJECTION, SOLUTION INTRAVENOUS; SUBCUTANEOUS at 09:17

## 2019-09-10 RX ADMIN — CALCITRIOL 0.25 MCG: 0.25 CAPSULE ORAL at 09:15

## 2019-09-10 RX ADMIN — HYDROCODONE BITARTRATE AND ACETAMINOPHEN 1 TABLET: 5; 325 TABLET ORAL at 09:29

## 2019-09-10 RX ADMIN — ACETAMINOPHEN 650 MG: 325 TABLET ORAL at 06:22

## 2019-09-10 RX ADMIN — IPRATROPIUM BROMIDE AND ALBUTEROL SULFATE 3 ML: .5; 3 SOLUTION RESPIRATORY (INHALATION) at 20:09

## 2019-09-10 RX ADMIN — CALCIUM ACETATE 667 MG: 667 CAPSULE ORAL at 18:26

## 2019-09-10 RX ADMIN — HYDROCODONE BITARTRATE AND ACETAMINOPHEN 1 TABLET: 5; 325 TABLET ORAL at 19:13

## 2019-09-10 RX ADMIN — ONDANSETRON 4 MG: 2 INJECTION INTRAMUSCULAR; INTRAVENOUS at 14:28

## 2019-09-10 RX ADMIN — HYDROCODONE BITARTRATE AND ACETAMINOPHEN 1 TABLET: 5; 325 TABLET ORAL at 14:28

## 2019-09-10 RX ADMIN — ANTI-FUNGAL POWDER MICONAZOLE NITRATE TALC FREE: 1.42 POWDER TOPICAL at 09:18

## 2019-09-10 RX ADMIN — APIXABAN 10 MG: 5 TABLET, FILM COATED ORAL at 21:13

## 2019-09-10 RX ADMIN — GUAIFENESIN 600 MG: 600 TABLET, EXTENDED RELEASE ORAL at 18:26

## 2019-09-10 RX ADMIN — SODIUM CHLORIDE, PRESERVATIVE FREE 10 ML: 5 INJECTION INTRAVENOUS at 21:14

## 2019-09-10 RX ADMIN — SODIUM CHLORIDE, PRESERVATIVE FREE 10 ML: 5 INJECTION INTRAVENOUS at 09:30

## 2019-09-10 RX ADMIN — ONDANSETRON 4 MG: 2 INJECTION INTRAMUSCULAR; INTRAVENOUS at 01:23

## 2019-09-10 RX ADMIN — HYDROCODONE BITARTRATE AND ACETAMINOPHEN 1 TABLET: 5; 325 TABLET ORAL at 03:59

## 2019-09-10 RX ADMIN — CEPHALEXIN 500 MG: 500 CAPSULE ORAL at 21:13

## 2019-09-10 RX ADMIN — ASPIRIN 81 MG: 81 TABLET, COATED ORAL at 09:16

## 2019-09-10 RX ADMIN — AMIODARONE HYDROCHLORIDE 200 MG: 200 TABLET ORAL at 09:16

## 2019-09-10 ASSESSMENT — ENCOUNTER SYMPTOMS
GASTROINTESTINAL NEGATIVE: 1
RESPIRATORY NEGATIVE: 1

## 2019-09-10 ASSESSMENT — PAIN SCALES - GENERAL
PAINLEVEL_OUTOF10: 9
PAINLEVEL_OUTOF10: 7
PAINLEVEL_OUTOF10: 10
PAINLEVEL_OUTOF10: 9
PAINLEVEL_OUTOF10: 9
PAINLEVEL_OUTOF10: 8
PAINLEVEL_OUTOF10: 10
PAINLEVEL_OUTOF10: 9
PAINLEVEL_OUTOF10: 8
PAINLEVEL_OUTOF10: 9
PAINLEVEL_OUTOF10: 10
PAINLEVEL_OUTOF10: 9
PAINLEVEL_OUTOF10: 8

## 2019-09-10 ASSESSMENT — PAIN DESCRIPTION - ORIENTATION: ORIENTATION: RIGHT

## 2019-09-10 ASSESSMENT — PAIN DESCRIPTION - LOCATION: LOCATION: LEG

## 2019-09-10 ASSESSMENT — PAIN DESCRIPTION - DESCRIPTORS: DESCRIPTORS: SPASM

## 2019-09-10 ASSESSMENT — PAIN DESCRIPTION - FREQUENCY: FREQUENCY: INTERMITTENT

## 2019-09-10 ASSESSMENT — PAIN DESCRIPTION - PAIN TYPE: TYPE: ACUTE PAIN

## 2019-09-10 ASSESSMENT — PAIN DESCRIPTION - ONSET: ONSET: ON-GOING

## 2019-09-10 ASSESSMENT — PAIN DESCRIPTION - PROGRESSION: CLINICAL_PROGRESSION: NOT CHANGED

## 2019-09-10 NOTE — PROGRESS NOTES
[facility]  Progress Note    Shelby Holliday  MRN: [id number]  Date: 9/10/2019    Reason for F/U :   Urinary tract infection    Impression :   1. E. coli urinary tract infection  2. Cardiac catheterization complicated by right groin hematoma/pseudoaneurysm  · Status post thrombin injection  3. Chronic renal failure  4. CHF    Recommendations:   · Continue Keflex through 2019  · The patient was denied insurance precertification for rehabilitation. · The patient now is in the process for insurance precertification for a skilled nursing facility. · She is otherwise doing well and continues on oral cephalexin therapy    Infection Control Recommendations:   Contact precautions    Discharge Planning:   Patient will need Midline Catheter Insertion/ PICC line Insertion: No  Patient will need: SNF  Patient willneed outpatient wound care: No    MedicalDecision making / Summary of Stay:   Shelby Holliday is a 69y.o.-year-old female admitted on 2019 with chest pain, found to have abnormal stress test status post cardiac cath on 2018 complicated by a right groin hematoma with suspected pseudoaneurysm was treated with bedrest and thrombin injection. Current evaluation:9/10/2019    BP (!) 97/38   Pulse 65   Temp 97.9 °F (36.6 °C) (Oral)   Resp 19   Ht 5' (1.524 m)   Wt 186 lb 8 oz (84.6 kg)   LMP 2004 (Within Years)   SpO2 100%   BMI 36.42 kg/m²     Temperature Range: Temp: 97.9 °F (36.6 °C) Temp  Av.2 °F (36.8 °C)  Min: 97.9 °F (36.6 °C)  Max: 98.6 °F (37 °C)  The patient is seen and evaluated at bedside she is awake and alert in no acute distress. She reports that she has thigh pain and had difficulty sleeping overnight. She does not report any subjective fevers or chills. Review of Systems   HENT: Negative. Respiratory: Negative. Cardiovascular: Negative. Gastrointestinal: Negative. Musculoskeletal:        Right thigh pain   Neurological: Negative.

## 2019-09-10 NOTE — PROGRESS NOTES
Physical Therapy  Facility/Department: [facility] PROGRESSIVE CARE  Daily Treatment Note  NAME: Shelby Holliday  : 1945  MRN: [id number]    Date of Service: 9/10/2019    Discharge Recommendations:  Subacute/Skilled Nursing Facility        Assessment   Body structures, Functions, Activity limitations: Decreased functional mobility ; Decreased endurance;Decreased balance;Decreased strength;Decreased safe awareness  Assessment: Pt limited by weakness, fatigue, and pain on this encounter. Pt will benefit from continued PT to improve strength, balance, activity tolerance, safety awareness, and functional mobility. Recommending SNF at this time for maximal pt safety. Activity Tolerance  Activity Tolerance: Patient limited by fatigue;Patient limited by pain; Patient limited by endurance     Patient Diagnosis(es): The primary encounter diagnosis was Right leg swelling. A diagnosis of Chest pain, unspecified type was also pertinent to this visit.      has a past medical history of Acute on chronic diastolic congestive heart failure ([address] Anesthesia complication, Asthma, Atrial fibrillation ([address] Cataracts, bilateral, Cellulitis, Cerebral artery occlusion with cerebral infarction ([address] CHF (congestive heart failure) ([address] Chronic kidney disease, Chronic kidney disease, Closed fracture of proximal end of right humerus with routine healing, Constipation, COPD (chronic obstructive pulmonary disease) ([address] Difficult intravenous access, Difficulty walking, Diverticulosis, DM2 (diabetes mellitus, type 2) ([address] Full dentures, GERD (gastroesophageal reflux disease), H/O fall, Headache(784.0), [place] (hard of hearing), Hyperlipidemia, Hyperplastic polyp of intestine, Hypertension, Impaired ambulation, Kidney stone, Living in nursing home, Morbid obesity with BMI of 40.0-44.9, adult ([address] Muscle weakness, Open wound, ECTOR on CPAP, Osteoarthritis, Parkinson disease ([address] Restless leg syndrome, Spinal stenosis in cervical region, Type II

## 2019-09-10 NOTE — PROGRESS NOTES
Subjective:     Follow-up type 2 diabetes  No polyuria or polydipsia no hypoglycemia blood sugars reviewed  ROS  Fever no chills no GI  complaints no cardiopulmonary complaints no TIA no bleeding no headache no sore throat no skin lesions except the ecchymosis hematoma right thigh right pedal edema shortness of breath with exertion  physical exam  General Appearance: alert and oriented to person, place and time and in no acute distress  Skin: Ecchymosis and hematoma right thigh  Head: normocephalic and atraumatic  Eyes: pupils equal, round, and reactive to light, sclera anicteric and positive pallor  Neck: neck supple and non tender without mass   Pulmonary/Chest: clear to auscultation bilaterally- no wheezes, rales or rhonchi, normal air movement, no respiratory distress  Cardiovascular: normal rate, regular rhythm, normal S1 and S2, no gallops, intact distal pulses and no carotid bruits  Abdomen: soft, non-tender, non-distended, normal bowel sounds, no masses or organomegaly  Extremities: Trace edema to right leg edema right thigh ecchymosis and hematoma  Neurologic: Alert oriented x2 no focal    BP (!) 103/39   Pulse 80   Temp 97.8 °F (36.6 °C) (Oral)   Resp 17   Ht 5' (1.524 m)   Wt 186 lb 8 oz (84.6 kg)   LMP 04/03/2004 (Within Years)   SpO2 97%   BMI 36.42 kg/m²     CBC:   Lab Results   Component Value Date    WBC 10.3 09/10/2019    RBC 2.38 09/10/2019    HGB 7.1 09/10/2019    HCT 23.7 09/10/2019    MCV 99.6 09/10/2019    MCH 29.8 09/10/2019    MCHC 30.0 09/10/2019    RDW 15.7 09/10/2019     09/10/2019    MPV 10.7 09/10/2019     CMP:    Lab Results   Component Value Date     09/10/2019    K 4.7 09/10/2019    CL 96 09/10/2019    CO2 26 09/10/2019    BUN 44 09/10/2019    CREATININE 1.74 09/10/2019    GFRAA 35 09/10/2019    LABGLOM 29 09/10/2019    GLUCOSE 128 09/10/2019    GLUCOSE 132 03/07/2012    PROT 6.3 06/05/2019    LABALBU 3.2 06/05/2019    LABALBU Detected 11/30/2018    CALCIUM 7.3

## 2019-09-10 NOTE — PROGRESS NOTES
Vascular Surgery   Progress Note    9/10/2019 5:23 PM  Subjective:   Admit Date: 8/25/2019  PCP: Elsa Miranda MD     Interval History: No complaints. Still awaiting precertification. Diet: DIET CARDIAC; No Added Salt (3-4 GM);  Daily Fluid Restriction: 1000 ml; Low Potassium, Low Phosphorus    Medications:   Scheduled Meds:   calcium acetate  667 mg Oral TID WC    iron sucrose  200 mg Intravenous Q24H    apixaban  10 mg Oral BID    cephALEXin  500 mg Oral 2 times per day    sodium chloride  250 mL Intravenous Once    sodium chloride  250 mL Intravenous Once    sodium chloride flush  10 mL Intravenous 2 times per day    metoprolol tartrate  25 mg Oral BID    miconazole   Topical BID    ipratropium-albuterol  3 mL Inhalation TID    darbepoetin albaro-polysorbate  60 mcg Subcutaneous Weekly    calcitRIOL  0.25 mcg Oral Daily    cyanocobalamin  1,000 mcg Intramuscular Q30 Days    aspirin  324 mg Oral Once    amiodarone  200 mg Oral Daily    atorvastatin  20 mg Oral Daily    mometasone-formoterol  2 puff Inhalation BID    carbidopa-levodopa  1 tablet Oral TID    linagliptin  2.5 mg Oral Daily    senna  2 tablet Oral Nightly    rOPINIRole  2 mg Oral TID    insulin lispro  0-6 Units Subcutaneous TID WC    insulin lispro  0-3 Units Subcutaneous Nightly    aspirin  81 mg Oral Daily    pantoprazole  40 mg Oral BID AC    rasagiline mesylate  1 mg Oral Daily     Continuous Infusions:   dextrose           Labs:   CBC:   Recent Labs     09/08/19  0622 09/09/19  0620 09/10/19  0612   WBC 11.6* 9.3 10.3   HGB 7.1* 7.5* 7.1*    129* 146     BMP:    Recent Labs     09/09/19  0620 09/09/19  1518 09/10/19  0612   * 136 136   K 4.4 4.6 4.7   CL 94* 96* 96*   CO2 26 28 26   BUN 40* 40* 44*   CREATININE 1.72* 1.61* 1.74*   GLUCOSE 119* 183* 128*         Objective:   Vitals: BP (!) 103/39   Pulse 80   Temp 97.8 °F (36.6 °C) (Oral)   Resp 17   Ht 5' (1.524 m)   Wt 186 lb 8 oz (84.6 kg)

## 2019-09-10 NOTE — PROGRESS NOTES
Difficult intravenous access, Difficulty walking, Diverticulosis, DM2 (diabetes mellitus, type 2) ([facility] [address] Full dentures, GERD (gastroesophageal reflux disease), H/O fall, Headache(784.0), [place] (hard of hearing), Hyperlipidemia, Hyperplastic polyp of intestine, Hypertension, Impaired ambulation, Kidney stone, Living in nursing home, Morbid obesity with BMI of 40.0-44.9, adult ([facility] [address] Muscle weakness, Open wound, ECTOR on CPAP, Osteoarthritis, Parkinson disease ([facility] [address] Restless leg syndrome, Spinal stenosis in cervical region, Type II or unspecified type diabetes mellitus without mention of complication, not stated as uncontrolled, Urethral caruncle, Wears glasses, and Wears glasses. has a past surgical history that includes Cervical disc surgery (2012); Appendectomy; Tonsillectomy and adenoidectomy (1953); hernia repair (1999); eye surgery (Bilateral, 2017); Cervical spine surgery (5/31/13); laminectomy (05/31/2013); Upper gastrointestinal endoscopy (12/28/2016); Colonoscopy (2009); Colonoscopy (12/29/2016); Colonoscopy (12/30/2016); Colonoscopy (04/25/2017); pr colsc flx w/removal lesion by hot bx forceps (N/A, 4/25/2017); Cystorrhaphy (04/04/2018); pr cystourethroscopy,biopsies (N/A, 4/4/2018); pr [facility] incl fluor gdnce dx w/cell washg spx (N/A, 6/5/2018); Upper gastrointestinal endoscopy (N/A, 8/29/2018); shoulder fracture surgery (Right, 5/28/2019); Hardware Removal (Right, 07/05/2019); and Hardware Removal (Right, 7/5/2019).     Restrictions  Restrictions/Precautions  Restrictions/Precautions: General Precautions, Fall Risk, Contact Precautions, Up as Tolerated  Required Braces or Orthoses?: No  Position Activity Restriction  Other position/activity restrictions: telemetry, 2L O2 nasal cannula  Subjective   General  Chart Reviewed: Yes  Patient assessed for rehabilitation services?: Yes  Response to previous treatment: Patient with no complaints from previous session  Family / Caregiver Present: No  General

## 2019-09-10 NOTE — PLAN OF CARE
Problem: Falls - Risk of:  Goal: Will remain free from falls  Description  Will remain free from falls  9/10/2019 0425 by Son Valentine RN  Outcome: Ongoing     Problem: Pain:  Goal: Pain level will decrease  Description  Pain level will decrease  Outcome: Ongoing     Problem: Breathing Pattern - Ineffective:  Goal: Ability to achieve and maintain a regular respiratory rate will improve  Description  Ability to achieve and maintain a regular respiratory rate will improve  Outcome: Ongoing     Problem: Risk for Impaired Skin Integrity  Goal: Tissue integrity - skin and mucous membranes  Description  Structural intactness and normal physiological function of skin and  mucous membranes.   Outcome: Ongoing     Problem: Tissue Perfusion:  Goal: Peripheral tissue perfusion will improve  Description  Peripheral tissue perfusion will improve  Outcome: Ongoing

## 2019-09-11 VITALS
HEART RATE: 70 BPM | TEMPERATURE: 97.7 F | OXYGEN SATURATION: 94 % | SYSTOLIC BLOOD PRESSURE: 126 MMHG | WEIGHT: 186.5 LBS | RESPIRATION RATE: 20 BRPM | BODY MASS INDEX: 36.61 KG/M2 | DIASTOLIC BLOOD PRESSURE: 37 MMHG | HEIGHT: 60 IN

## 2019-09-11 LAB
ABSOLUTE EOS #: 0.16 K/UL (ref 0–0.44)
ABSOLUTE IMMATURE GRANULOCYTE: 0.42 K/UL (ref 0–0.3)
ABSOLUTE LYMPH #: 1.08 K/UL (ref 1.1–3.7)
ABSOLUTE MONO #: 1 K/UL (ref 0.1–1.2)
ANION GAP SERPL CALCULATED.3IONS-SCNC: 13 MMOL/L (ref 9–17)
BASOPHILS # BLD: 0 % (ref 0–2)
BASOPHILS ABSOLUTE: 0.04 K/UL (ref 0–0.2)
BUN BLDV-MCNC: 47 MG/DL (ref 8–23)
BUN/CREAT BLD: 26 (ref 9–20)
CALCIUM SERPL-MCNC: 7.6 MG/DL (ref 8.6–10.4)
CHLORIDE BLD-SCNC: 98 MMOL/L (ref 98–107)
CO2: 27 MMOL/L (ref 20–31)
CREAT SERPL-MCNC: 1.8 MG/DL (ref 0.5–0.9)
DIFFERENTIAL TYPE: ABNORMAL
EOSINOPHILS RELATIVE PERCENT: 2 % (ref 1–4)
GFR AFRICAN AMERICAN: 34 ML/MIN
GFR NON-AFRICAN AMERICAN: 28 ML/MIN
GFR SERPL CREATININE-BSD FRML MDRD: ABNORMAL ML/MIN/{1.73_M2}
GFR SERPL CREATININE-BSD FRML MDRD: ABNORMAL ML/MIN/{1.73_M2}
GLUCOSE BLD-MCNC: 114 MG/DL (ref 65–105)
GLUCOSE BLD-MCNC: 118 MG/DL (ref 65–105)
GLUCOSE BLD-MCNC: 122 MG/DL (ref 65–105)
GLUCOSE BLD-MCNC: 142 MG/DL (ref 70–99)
HCT VFR BLD CALC: 23.2 % (ref 36.3–47.1)
HEMOGLOBIN: 7 G/DL (ref 11.9–15.1)
IMMATURE GRANULOCYTES: 5 %
LYMPHOCYTES # BLD: 12 % (ref 24–43)
MAGNESIUM: 1.8 MG/DL (ref 1.6–2.6)
MCH RBC QN AUTO: 29.9 PG (ref 25.2–33.5)
MCHC RBC AUTO-ENTMCNC: 30.2 G/DL (ref 28.4–34.8)
MCV RBC AUTO: 99.1 FL (ref 82.6–102.9)
MONOCYTES # BLD: 11 % (ref 3–12)
NRBC AUTOMATED: 0 PER 100 WBC
PARTIAL THROMBOPLASTIN TIME: 40.8 SEC (ref 23–31)
PDW BLD-RTO: 15.7 % (ref 11.8–14.4)
PHOSPHORUS: 5 MG/DL (ref 2.6–4.5)
PLATELET # BLD: 153 K/UL (ref 138–453)
PLATELET ESTIMATE: ABNORMAL
PMV BLD AUTO: 10.1 FL (ref 8.1–13.5)
POTASSIUM SERPL-SCNC: 4.9 MMOL/L (ref 3.7–5.3)
RBC # BLD: 2.34 M/UL (ref 3.95–5.11)
RBC # BLD: ABNORMAL 10*6/UL
SEG NEUTROPHILS: 71 % (ref 36–65)
SEGMENTED NEUTROPHILS ABSOLUTE COUNT: 6.59 K/UL (ref 1.5–8.1)
SODIUM BLD-SCNC: 138 MMOL/L (ref 135–144)
WBC # BLD: 9.3 K/UL (ref 3.5–11.3)
WBC # BLD: ABNORMAL 10*3/UL

## 2019-09-11 PROCEDURE — 6370000000 HC RX 637 (ALT 250 FOR IP): Performed by: INTERNAL MEDICINE

## 2019-09-11 PROCEDURE — 83735 ASSAY OF MAGNESIUM: CPT

## 2019-09-11 PROCEDURE — 94640 AIRWAY INHALATION TREATMENT: CPT

## 2019-09-11 PROCEDURE — 99232 SBSQ HOSP IP/OBS MODERATE 35: CPT | Performed by: INTERNAL MEDICINE

## 2019-09-11 PROCEDURE — 82947 ASSAY GLUCOSE BLOOD QUANT: CPT

## 2019-09-11 PROCEDURE — 6370000000 HC RX 637 (ALT 250 FOR IP): Performed by: SURGERY

## 2019-09-11 PROCEDURE — 85730 THROMBOPLASTIN TIME PARTIAL: CPT

## 2019-09-11 PROCEDURE — 85025 COMPLETE CBC W/AUTO DIFF WBC: CPT

## 2019-09-11 PROCEDURE — 97110 THERAPEUTIC EXERCISES: CPT

## 2019-09-11 PROCEDURE — 97530 THERAPEUTIC ACTIVITIES: CPT

## 2019-09-11 PROCEDURE — 2580000003 HC RX 258: Performed by: INTERNAL MEDICINE

## 2019-09-11 PROCEDURE — 36415 COLL VENOUS BLD VENIPUNCTURE: CPT

## 2019-09-11 PROCEDURE — 84100 ASSAY OF PHOSPHORUS: CPT

## 2019-09-11 PROCEDURE — 80048 BASIC METABOLIC PNL TOTAL CA: CPT

## 2019-09-11 RX ORDER — NICOTINE POLACRILEX 4 MG
15 LOZENGE BUCCAL PRN
Qty: 45 G | Refills: 1 | Status: ON HOLD | DISCHARGE
Start: 2019-09-11 | End: 2019-12-06

## 2019-09-11 RX ORDER — POLYETHYLENE GLYCOL 3350 17 G/17G
17 POWDER, FOR SOLUTION ORAL DAILY PRN
Qty: 527 G | Refills: 1 | DISCHARGE
Start: 2019-09-11 | End: 2019-10-11

## 2019-09-11 RX ORDER — CEPHALEXIN 500 MG/1
500 CAPSULE ORAL EVERY 12 HOURS SCHEDULED
Qty: 4 CAPSULE | Refills: 0 | DISCHARGE
Start: 2019-09-11 | End: 2019-09-13

## 2019-09-11 RX ORDER — ALBUTEROL SULFATE 2.5 MG/3ML
2.5 SOLUTION RESPIRATORY (INHALATION) 4 TIMES DAILY
Qty: 120 EACH | Refills: 3 | DISCHARGE
Start: 2019-09-11 | End: 2021-01-01 | Stop reason: ALTCHOICE

## 2019-09-11 RX ORDER — HYDROCODONE BITARTRATE AND ACETAMINOPHEN 5; 325 MG/1; MG/1
1 TABLET ORAL EVERY 8 HOURS PRN
Qty: 15 TABLET | Refills: 0 | Status: SHIPPED | OUTPATIENT
Start: 2019-09-11 | End: 2019-10-01

## 2019-09-11 RX ADMIN — CALCIUM ACETATE 667 MG: 667 CAPSULE ORAL at 13:24

## 2019-09-11 RX ADMIN — ASPIRIN 81 MG: 81 TABLET, COATED ORAL at 09:07

## 2019-09-11 RX ADMIN — METOPROLOL TARTRATE 25 MG: 25 TABLET, FILM COATED ORAL at 09:08

## 2019-09-11 RX ADMIN — ROPINIROLE HYDROCHLORIDE 2 MG: 2 TABLET, FILM COATED ORAL at 09:08

## 2019-09-11 RX ADMIN — PANTOPRAZOLE SODIUM 40 MG: 40 TABLET, DELAYED RELEASE ORAL at 17:48

## 2019-09-11 RX ADMIN — APIXABAN 10 MG: 5 TABLET, FILM COATED ORAL at 09:07

## 2019-09-11 RX ADMIN — LINAGLIPTIN 2.5 MG: 5 TABLET, FILM COATED ORAL at 09:08

## 2019-09-11 RX ADMIN — RASAGILINE 1 MG: 1 TABLET ORAL at 09:17

## 2019-09-11 RX ADMIN — PANTOPRAZOLE SODIUM 40 MG: 40 TABLET, DELAYED RELEASE ORAL at 06:14

## 2019-09-11 RX ADMIN — CARBIDOPA AND LEVODOPA 1 TABLET: 25; 100 TABLET, EXTENDED RELEASE ORAL at 13:24

## 2019-09-11 RX ADMIN — CALCIUM ACETATE 667 MG: 667 CAPSULE ORAL at 09:07

## 2019-09-11 RX ADMIN — HYDROCODONE BITARTRATE AND ACETAMINOPHEN 1 TABLET: 5; 325 TABLET ORAL at 15:18

## 2019-09-11 RX ADMIN — IPRATROPIUM BROMIDE AND ALBUTEROL SULFATE 3 ML: .5; 3 SOLUTION RESPIRATORY (INHALATION) at 08:16

## 2019-09-11 RX ADMIN — CYCLOBENZAPRINE HYDROCHLORIDE 5 MG: 5 TABLET, FILM COATED ORAL at 01:16

## 2019-09-11 RX ADMIN — Medication 5 MG: at 00:39

## 2019-09-11 RX ADMIN — CARBIDOPA AND LEVODOPA 1 TABLET: 25; 100 TABLET, EXTENDED RELEASE ORAL at 09:07

## 2019-09-11 RX ADMIN — CALCIUM ACETATE 667 MG: 667 CAPSULE ORAL at 17:48

## 2019-09-11 RX ADMIN — ANTI-FUNGAL POWDER MICONAZOLE NITRATE TALC FREE: 1.42 POWDER TOPICAL at 09:08

## 2019-09-11 RX ADMIN — ROPINIROLE HYDROCHLORIDE 2 MG: 2 TABLET, FILM COATED ORAL at 13:24

## 2019-09-11 RX ADMIN — HYDROCODONE BITARTRATE AND ACETAMINOPHEN 1 TABLET: 5; 325 TABLET ORAL at 19:45

## 2019-09-11 RX ADMIN — AMIODARONE HYDROCHLORIDE 200 MG: 200 TABLET ORAL at 09:07

## 2019-09-11 RX ADMIN — HYDROCODONE BITARTRATE AND ACETAMINOPHEN 1 TABLET: 5; 325 TABLET ORAL at 00:39

## 2019-09-11 RX ADMIN — HYDROCODONE BITARTRATE AND ACETAMINOPHEN 1 TABLET: 5; 325 TABLET ORAL at 10:37

## 2019-09-11 RX ADMIN — SODIUM CHLORIDE, PRESERVATIVE FREE 10 ML: 5 INJECTION INTRAVENOUS at 09:09

## 2019-09-11 RX ADMIN — HYDROCODONE BITARTRATE AND ACETAMINOPHEN 1 TABLET: 5; 325 TABLET ORAL at 06:14

## 2019-09-11 RX ADMIN — CALCITRIOL 0.25 MCG: 0.25 CAPSULE ORAL at 09:07

## 2019-09-11 RX ADMIN — CEPHALEXIN 500 MG: 500 CAPSULE ORAL at 09:08

## 2019-09-11 ASSESSMENT — PAIN SCALES - GENERAL
PAINLEVEL_OUTOF10: 9
PAINLEVEL_OUTOF10: 8
PAINLEVEL_OUTOF10: 6
PAINLEVEL_OUTOF10: 9
PAINLEVEL_OUTOF10: 8
PAINLEVEL_OUTOF10: 7
PAINLEVEL_OUTOF10: 6

## 2019-09-11 NOTE — PROGRESS NOTES
5000 MCG TBDP Take 5,000 mcg by mouth daily   Yes Historical Provider, MD   calcitRIOL (ROCALTROL) 0.25 MCG capsule Take 0.25 mcg by mouth daily   Yes Historical Provider, MD   furosemide (LASIX) 20 MG tablet Take 1 tablet by mouth daily 5/30/19  Yes Scott Martin MD   atorvastatin (LIPITOR) 20 MG tablet TAKE 1 TABLET DAILY 4/18/19  Yes Lesly Hill DO   carbidopa-levodopa (SINEMET CR)  MG per extended release tablet Take 1 tablet by mouth 4 times daily  Patient taking differently: Take 1 tablet by mouth 3 times daily  4/9/19  Yes Lesly Hill DO   rOPINIRole (REQUIP) 2 MG tablet Take 1 tablet by mouth 3 times daily 4/9/19  Yes Lesly Hill DO   pantoprazole (PROTONIX) 40 MG tablet TAKE 1 TABLET TWICE A DAY BEFORE MEALS 2/7/19  Yes Lesly Hill DO   linagliptin (TRADJENTA) 5 MG tablet Take 0.5 tablets by mouth daily 1/16/19  Yes Lesly Hill DO   amiodarone (CORDARONE) 200 MG tablet Take 200 mg by mouth daily    Yes Historical Provider, MD   senna (SENOKOT) 8.6 MG tablet Take 2 tablets by mouth nightly    Yes Historical Provider, MD   aspirin 81 MG tablet Take 81 mg by mouth daily    Yes Historical Provider, MD   ferrous sulfate 325 (65 Fe) MG EC tablet Take 1 tablet by mouth 2 times daily (with meals) 12/21/17  Yes Jose Perez,    rasagiline mesylate 1 MG TABS Take 1 tablet by mouth daily   Yes Historical Provider, MD   calcium citrate (CALCITRATE) 250 MG TABS tablet Take 500 mg by mouth 3 times daily    Historical Provider, MD   nystatin-triamcinolone (MYCOLOG II) 058919-2.1 UNIT/GM-% cream Apply topically 2 times daily abd folds    Historical Provider, MD   cyclobenzaprine (FLEXERIL) 5 MG tablet Take 5 mg by mouth 3 times daily as needed for Muscle spasms    Historical Provider, MD   budesonide-formoterol (SYMBICORT) 80-4.5 MCG/ACT AERO Inhale 2 puffs into the lungs 2 times daily    Historical Provider, MD   ipratropium-albuterol (DUONEB) 0.5-2.5 (3) MG/3ML SOLN nebulizer solution carbidopa-levodopa (SINEMET CR)  MG per extended release tablet 1 tablet  1 tablet Oral TID Alistair Allen MD   1 tablet at 09/10/19 1428    linagliptin (TRADJENTA) tablet 2.5 mg  2.5 mg Oral Daily Alistair Allen MD   2.5 mg at 09/10/19 0916    senna (SENOKOT) tablet 17.2 mg  2 tablet Oral Nightly Alistair Allen MD   17.2 mg at 09/09/19 2031    rOPINIRole (REQUIP) tablet 2 mg  2 mg Oral TID Alistair Allen MD   2 mg at 09/10/19 1428    glucose (GLUTOSE) 40 % oral gel 15 g  15 g Oral PRN Alistair Allen MD        dextrose 50 % IV solution  12.5 g Intravenous PRN Alistair Allen MD        glucagon (rDNA) injection 1 mg  1 mg Intramuscular PRN Alistair Allen MD        dextrose 5 % solution  100 mL/hr Intravenous PRN Alistair Allen MD        insulin lispro (HUMALOG) injection vial 0-6 Units  0-6 Units Subcutaneous TID WC Alistair Allen MD   2 Units at 09/08/19 1226    insulin lispro (HUMALOG) injection vial 0-3 Units  0-3 Units Subcutaneous Nightly Alistair Allen MD   1 Units at 09/08/19 2050    albuterol (PROVENTIL) nebulizer solution 2.5 mg  2.5 mg Nebulization As Directed RT PRN Alistair Allen MD        Melatonin CHEW 5 mg  5 mg Oral Nightly PRN Alistair Allen MD   5 mg at 09/09/19 2141    aspirin EC tablet 81 mg  81 mg Oral Daily Alistair Allen MD   81 mg at 09/10/19 0916    pantoprazole (PROTONIX) tablet 40 mg  40 mg Oral BID AC Alistair Allen MD   40 mg at 09/10/19 1826    rasagiline mesylate TABS 1 mg  1 mg Oral Daily Alistair Allen MD   1 mg at 09/10/19 9961       Allergies:  Dye [barium-containing compounds]; Pcn [penicillins]; and Bactrim [sulfamethoxazole-trimethoprim]    Social History:   reports that she quit smoking about 48 years ago. Her smoking use included cigarettes. She has a 30.00 pack-year smoking history. She has never used smokeless tobacco. She reports that she drinks about 2.0 standard drinks of alcohol per week.  She reports that she does not use visit, the document can still have some errors including those of syntax and sound a like substitutions which may escape proof reading. It such instances, actual meaning can be extrapolated by contextual diversion.

## 2019-09-11 NOTE — DISCHARGE SUMMARY
cyclobenzaprine (FLEXERIL) 5 MG tablet Take 5 mg by mouth 3 times daily as needed for Muscle spasms      budesonide-formoterol (SYMBICORT) 80-4.5 MCG/ACT AERO Inhale 2 puffs into the lungs 2 times daily      ipratropium-albuterol (DUONEB) 0.5-2.5 (3) MG/3ML SOLN nebulizer solution Inhale 3 mLs into the lungs three times daily  Qty: 360 mL, Refills: 3      !! ONETOUCH VERIO strip 1 each by In Vitro route daily As needed. Qty: 100 each, Refills: 3      !! ONE TOUCH ULTRA TEST strip USE 1 STRIP THREE TIMES A DAY  Qty: 300 each, Refills: 3      magnesium oxide (MAG-OX) 400 (241.3 Mg) MG TABS tablet Take 1 tablet by mouth 2 times daily  Qty: 30 tablet      Oxygen Concentrator Dx: Pneumonia, use as directed. Qty: 1 each, Refills: 0       !! - Potential duplicate medications found. Please discuss with provider. STOP taking these medications       darbepoetin albaro-polysorbate (ARANESP) 200 MCG/0.4ML SOSY injection Comments:   Reason for Stopping:         sulfamethoxazole-trimethoprim (BACTRIM DS) 800-160 MG per tablet Comments:   Reason for Stopping:         Cyanocobalamin 5000 MCG TBDP Comments:   Reason for Stopping:         calcium citrate (CALCITRATE) 250 MG TABS tablet Comments:   Reason for Stopping:         albuterol (ACCUNEB) 1.25 MG/3ML nebulizer solution Comments:   Reason for Stopping:         Faby Ply LANCETS 55Q MISC Comments:   Reason for Stopping:         vitamin D (CHOLECALCIFEROL) 5000 units CAPS capsule Comments:   Reason for Stopping:             Activity: Up with walker with assistance oxygen 24/7  Diet: diabetic diet and renal diet    Follow-up with CP in 1 week. Neurology in 2 weeks cardiology in 2 weeks community referral completed more than 30 minutes spent on discharge advised to avoid nephrotoxic drugs  Signed:   Merary Fried MD  9/11/2019  6:00 PM

## 2019-09-11 NOTE — PROGRESS NOTES
daily 3/20/19   Lesly Hill DO   ONETOUCH VERIO strip 1 each by In Vitro route daily As needed. 3/4/19   Lesly Hill DO   ONE TOUCH ULTRA TEST strip USE 1 STRIP THREE TIMES A DAY 12/26/18   Lsely Hill DO   albuterol (ACCUNEB) 1.25 MG/3ML nebulizer solution Inhale 1 ampule into the lungs every 6 hours as needed for Wheezing or Shortness of Breath    Historical Provider, MD Crow Stern LANCETS 66L MISC 1 Units by Does not apply route 2 times daily 10/23/18   Lesly Hill DO   magnesium oxide (MAG-OX) 400 (241.3 Mg) MG TABS tablet Take 1 tablet by mouth 2 times daily 9/7/18   Daniel Marcos DO   vitamin D (CHOLECALCIFEROL) 5000 units CAPS capsule Take 5,000 Units by mouth daily    Historical Provider, MD   Oxygen Concentrator Dx: Pneumonia, use as directed. Patient taking differently: Dx: Pneumonia, use as directed.    ON 24/7 2/10/17   Sara Hill DO       Scheduled Meds:   calcium acetate  667 mg Oral TID WC    iron sucrose  200 mg Intravenous Q24H    apixaban  10 mg Oral BID    cephALEXin  500 mg Oral 2 times per day    sodium chloride  250 mL Intravenous Once    sodium chloride  250 mL Intravenous Once    sodium chloride flush  10 mL Intravenous 2 times per day    metoprolol tartrate  25 mg Oral BID    miconazole   Topical BID    ipratropium-albuterol  3 mL Inhalation TID    darbepoetin albaro-polysorbate  60 mcg Subcutaneous Weekly    calcitRIOL  0.25 mcg Oral Daily    cyanocobalamin  1,000 mcg Intramuscular Q30 Days    aspirin  324 mg Oral Once    amiodarone  200 mg Oral Daily    atorvastatin  20 mg Oral Daily    mometasone-formoterol  2 puff Inhalation BID    carbidopa-levodopa  1 tablet Oral TID    linagliptin  2.5 mg Oral Daily    senna  2 tablet Oral Nightly    rOPINIRole  2 mg Oral TID    insulin lispro  0-6 Units Subcutaneous TID WC    insulin lispro  0-3 Units Subcutaneous Nightly    aspirin  81 mg Oral Daily    pantoprazole  40 mg Oral BID AC    rasagiline (H) 09/09/2019    GLUCOSE 142 (H) 09/11/2019    GLUCOSE 128 (H) 09/10/2019    GLUCOSE 183 (H) 09/09/2019     CMP:   Lab Results   Component Value Date     09/11/2019    K 4.9 09/11/2019    CL 98 09/11/2019    CO2 27 09/11/2019    BUN 47 09/11/2019    CREATININE 1.80 09/11/2019    GLUCOSE 142 09/11/2019    GLUCOSE 132 03/07/2012    CALCIUM 7.6 09/11/2019    PROT 6.3 06/05/2019    LABALBU 3.2 06/05/2019    LABALBU Detected 11/30/2018    BILITOT 0.16 09/01/2019    ALKPHOS 67 06/05/2019    AST 17 06/05/2019    ALT <5 06/05/2019      Hepatic:   Lab Results   Component Value Date    AST 17 06/05/2019    AST 17 06/04/2019    AST 14 11/28/2018    ALT <5 (L) 06/05/2019    ALT <5 (L) 06/04/2019    ALT <5 (L) 11/28/2018    BILITOT 0.16 (L) 09/01/2019    BILITOT 0.43 06/05/2019    BILITOT 0.52 06/04/2019    ALKPHOS 67 06/05/2019    ALKPHOS 75 06/04/2019    ALKPHOS 42 11/28/2018     BNP:   Lab Results   Component Value Date    BNP 40 06/07/2013     (H) 05/31/2013     Lipids:   Lab Results   Component Value Date    CHOL 135 08/26/2017    HDL 30 (L) 08/26/2017     INR:   Lab Results   Component Value Date    INR 0.9 09/01/2019    INR 1.0 08/30/2019    INR 1.0 08/25/2019     PTH: No results found for: PTH  Phosphorus:    Lab Results   Component Value Date    PHOS 5.0 09/11/2019     Ionized Calcium: No results found for: IONCA  Magnesium:   Lab Results   Component Value Date    MG 1.8 09/11/2019     Albumin:   Lab Results   Component Value Date    LABALBU 3.2 06/05/2019    LABALBU Detected 11/30/2018     Last 3 CK, CKMB, Troponin: @LABRCNT(CKTOTAL:3,CKMB:3,TROPONINI:3)       URINE:)No results found for: Johny Muñiz    Radiology:   Reviewed. Assessment:  1. Acute Kidney Injury-multifactorial. FeNa was 0.52%. Cr is higher today. A. Bactrim       B. Vasomotor  2. CKD stage 3B/4  3. Hyperkalemia  4. HTN with recent hypotension  5. Hypomagnesemia  6. Anemia of chronic disease  7. Right groin hematoma  8.  Bilateral nephrolithiasis    Plan:  Renal function close to baseline  Okay for discharge from nephrology perspective  Continue Phoslo. Monitor phosphorus. On EMMY and venofer. Monitor Hg. Started on eliquis for 3 months. Avoid nephrotoxic drugs, fleet's enemas, NSAIDs. Electronically signed by Mariza Jett MD on 9/11/2019 at 3:41 PM  [facility].   Ph: [phone]

## 2019-09-11 NOTE — CARE COORDINATION
LUIS spoke with Summer from [place] regarding the insurance pre-cert has been approved. LUIS called pt LILLY Conrad regarding approval.  Efren Conrad will contact the physician to find out if pt will be ready today.    LUIS explained this facility is bed tight and need an answer as soon as possible

## 2019-09-11 NOTE — PROGRESS NOTES
FEV1 Deferred    FEV1 % Predicted   FVC   IS volume   IBW   FIO2% 2  SPO2 99  RR 18  Breath Sounds: Diminished clear      · Bronchodilator assessment at level  HM  · Hyperinflation assessment at level   · Secretion Management assessment at level    ·   · []    Bronchodilator Assessment  BRONCHODILATOR ASSESSMENT SCORE  Score 0 1 2 3 4 5   Breath Sounds   [x]  Patient Baseline [x]  No Wheeze good aeration []  Faint, scattered wheezing, good aeration []  Expiratory Wheezing and or moderately diminished []  Insp/Exp wheeze and/or very diminished []  Insp/Exp and/ or marked distress   Respiratory Rate   [x]  Patient Baseline [x]  Less than 20 []  Less than 20 []  20-25 []  Greater than 25 []  Greater than 25   Peak flow % of Pred or PB [x]  NA   []  Greater than 90%  []  81-90% []  71-80% []  Less than or equal to 70%  or unable to perform []  Unable due to Respiratory Distress   Dyspnea re []  Patient Baseline []  No SOB []  No SOB [x]  SOB on exertion []  SOB min activity []  At rest/acute   e FEV% Predicted       []  NA []  Above 69%  [x]  Unable []  Above 60-69%  [x]  Unable []  Above 50-59%  [x]  Unable []  Above 35-49%  [x]  Unable []  Less than 35%  [x]  Unable                 []  Hyperinflation Assessment  Score 1 2 3   CXR and Breath Sounds   []  Clear []  No atelectasis  Basilar aeration []  Atelectasis or absent basilar breath sounds   Incentive Spirometry Volume  (Per IBW)   []  Greater than or equal to 15ml/Kg []  less than 15ml/Kg []  less than 15ml/Kg   Surgery within last 2 weeks []  None or general   []  Abdominal or thoracic surgery  []  Abdominal or thoracic   Chronic Pulmonary Historyre []  No []  Yes []  Yes     []  Secretion Management Assessment  Score 1 2 3   Bilateral Breath Sounds   []  Occasional Rhonchi []  Scattered Rhonchi []  Course Rhonchi and/or poor aeration   Sputum    []  Small amount of thin secretions []  Moderate amount of viscous secretions []  Copius, Viscious Yellow/ Secretions   CXR as reported by physician []  clear  []  Unavailable []  Infiltrates and/or consolidation  []  Unavailable []  Mucus Plugging and or lobar consolidation  []  Unavailable   Cough []  Strong, productive cough []  Weak productive cough []  No cough or weak non-productive cough

## 2019-09-11 NOTE — DISCHARGE INSTR - COC
renal failure, stage 4 (severe) ([facility]) N18.4, D63.1    Traumatic closed displaced fracture of proximal end of right humerus, initial encounter S42.201A    Urinary tract infection without hematuria G72.7    Metabolic encephalopathy F94.01    Chest pain R07.9    Acute kidney injury ([address] N17.9    Right leg swelling M79.89       Isolation/Infection:   Isolation          Contact        Patient Infection Status     Infection Onset Added Last Indicated Last Indicated By Review Planned Expiration Resolved Resolved By    ESBL (Extended Spectrum Beta Lactamase)  03/10/18 03/10/18 Luis Enrique Esquivel RN        E. Coli - urine 9/2019            Nurse Assessment:  Last Vital Signs: BP (!) 130/48   Pulse 73   Temp 98.2 °F (36.8 °C) (Oral)   Resp 14   Ht 5' (1.524 m)   Wt 186 lb 8 oz (84.6 kg)   LMP 04/03/2004 (Within Years)   SpO2 99%   BMI 36.42 kg/m²     Last documented pain score (0-10 scale): Pain Level: 7  Last Weight:   Wt Readings from Last 1 Encounters:   09/08/19 186 lb 8 oz (84.6 kg)     Mental Status:  oriented and alert    IV Access:  - None    Nursing Mobility/ADLs:  Walking   Assisted  Transfer  Assisted  Bathing  Assisted  Dressing  Assisted  Toileting  Assisted  Feeding  Independent  Med Admin  Independent  Med Delivery   whole    Wound Care Documentation and Therapy:  Wound 05/30/19 Coccyx Upper small \"slit\" toward bottom of coccyx/present on admission (Active)   Number of days: 104       Wound 06/05/19 Heel Right (Active)   Number of days: 97       Wound 06/05/19 Thigh Medial;Left (Active)   Number of days: 97        Elimination:  Continence:   · Bowel: Yes  · Bladder: No  Urinary Catheter: None   Colostomy/Ileostomy/Ileal Conduit: No       Date of Last BM: 09/10/2019    Intake/Output Summary (Last 24 hours) at 9/11/2019 1209  Last data filed at 9/11/2019 1155  Gross per 24 hour   Intake 800 ml   Output 400 ml   Net 400 ml     I/O last 3 completed shifts: In: 566 [P.O.:420;  I.V.:500]  Out: 250

## 2019-09-11 NOTE — PROGRESS NOTES
calcitRIOL  0.25 mcg Oral Daily    cyanocobalamin  1,000 mcg Intramuscular Q30 Days    aspirin  324 mg Oral Once    amiodarone  200 mg Oral Daily    atorvastatin  20 mg Oral Daily    mometasone-formoterol  2 puff Inhalation BID    carbidopa-levodopa  1 tablet Oral TID    linagliptin  2.5 mg Oral Daily    senna  2 tablet Oral Nightly    rOPINIRole  2 mg Oral TID    insulin lispro  0-6 Units Subcutaneous TID WC    insulin lispro  0-3 Units Subcutaneous Nightly    aspirin  81 mg Oral Daily    pantoprazole  40 mg Oral BID AC    rasagiline mesylate  1 mg Oral Daily     Continuous Infusions:   dextrose         Assessment/ Plan :   Right groin pseudoaneurysm - stable. Non-obstructive CAD. Paroxysmal atrial fibrillation. Hypertension. Acute on chronic anemia. Acute on chronic renal failure -resolved. Supportive care. Will follow. Thank you very much for allowing us to participate in the care of this patient. Please call us with any questions.     Electronically signed by Fransisco Yuen MD on 9/11/2019 at 9:47 AM

## 2019-09-11 NOTE — PROGRESS NOTES
dentures, GERD (gastroesophageal reflux disease), H/O fall, Headache(784.0), [place] (hard of hearing), Hyperlipidemia, Hyperplastic polyp of intestine, Hypertension, Impaired ambulation, Kidney stone, Living in nursing home, Morbid obesity with BMI of 40.0-44.9, adult ([facility] [address] Muscle weakness, Open wound, ECTOR on CPAP, Osteoarthritis, Parkinson disease ([facility] [address] Restless leg syndrome, Spinal stenosis in cervical region, Type II or unspecified type diabetes mellitus without mention of complication, not stated as uncontrolled, Urethral caruncle, Wears glasses, and Wears glasses. Past Surgical History:   has a past surgical history that includes Cervical disc surgery (2012); Appendectomy; Tonsillectomy and adenoidectomy (1953); hernia repair (1999); eye surgery (Bilateral, 2017); Cervical spine surgery (5/31/13); laminectomy (05/31/2013); Upper gastrointestinal endoscopy (12/28/2016); Colonoscopy (2009); Colonoscopy (12/29/2016); Colonoscopy (12/30/2016); Colonoscopy (04/25/2017); pr colsc flx w/removal lesion by hot bx forceps (N/A, 4/25/2017); Cystorrhaphy (04/04/2018); pr cystourethroscopy,biopsies (N/A, 4/4/2018); pr [facility] incl fluor gdnce dx w/cell washg spx (N/A, 6/5/2018); Upper gastrointestinal endoscopy (N/A, 8/29/2018); shoulder fracture surgery (Right, 5/28/2019); Hardware Removal (Right, 07/05/2019); and Hardware Removal (Right, 7/5/2019). Medications:    Prior to Admission medications    Medication Sig Start Date End Date Taking?  Authorizing Provider   darbepoetin albaro-polysorbate (ARANESP) 200 MCG/0.4ML SOSY injection Inject 200 mcg into the skin every 28 days   Yes Historical Provider, MD   cyanocobalamin 1000 MCG/ML injection Inject 1,000 mcg into the muscle every 30 days   Yes Historical Provider, MD   sulfamethoxazole-trimethoprim (BACTRIM DS) 800-160 MG per tablet Take 1 tablet by mouth 2 times daily   Yes Historical Provider, MD   melatonin 5 MG TABS tablet Take 5 mg by mouth nightly as needed BID Kurtis Meneses MD   2 puff at 09/03/19 0931    carbidopa-levodopa (SINEMET CR)  MG per extended release tablet 1 tablet  1 tablet Oral TID Kurtis Meneses MD   1 tablet at 09/11/19 4941    linagliptin (TRADJENTA) tablet 2.5 mg  2.5 mg Oral Daily Kurtis Meneses MD   2.5 mg at 09/11/19 0908    senna (SENOKOT) tablet 17.2 mg  2 tablet Oral Nightly Kurtis Meneses MD   17.2 mg at 09/10/19 2113    rOPINIRole (REQUIP) tablet 2 mg  2 mg Oral TID Kurtis Meneses MD   2 mg at 09/11/19 0908    glucose (GLUTOSE) 40 % oral gel 15 g  15 g Oral PRN Kurtis Meneses MD        dextrose 50 % IV solution  12.5 g Intravenous PRN Kurtis Meneses MD        glucagon (rDNA) injection 1 mg  1 mg Intramuscular PRN Kurtis Meneses MD        dextrose 5 % solution  100 mL/hr Intravenous PRN Kurtis Meneses MD        insulin lispro (HUMALOG) injection vial 0-6 Units  0-6 Units Subcutaneous TID WC Kurtis Meneses MD   2 Units at 09/08/19 1226    insulin lispro (HUMALOG) injection vial 0-3 Units  0-3 Units Subcutaneous Nightly Kurtis Meneses MD   1 Units at 09/10/19 2117    albuterol (PROVENTIL) nebulizer solution 2.5 mg  2.5 mg Nebulization As Directed RT PRN Kurtis Meneses MD        Melatonin CHEW 5 mg  5 mg Oral Nightly PRN Kurtis Meneses MD   5 mg at 09/11/19 0039    aspirin EC tablet 81 mg  81 mg Oral Daily Kurtis Meneses MD   81 mg at 09/11/19 5757    pantoprazole (PROTONIX) tablet 40 mg  40 mg Oral BID AC Kurtis Meneses MD   40 mg at 09/11/19 9234    rasagiline mesylate TABS 1 mg  1 mg Oral Daily Kurtis Meneses MD   1 mg at 09/11/19 9597       Allergies:  Dye [barium-containing compounds]; Pcn [penicillins]; and Bactrim [sulfamethoxazole-trimethoprim]    Social History:   reports that she quit smoking about 48 years ago. Her smoking use included cigarettes. She has a 30.00 pack-year smoking history.  She has never used smokeless tobacco. She reports that she drinks about 2.0 standard drinks of no improvement in the hemoglobin I will do bone marrow test.  6. Will follow   7. Waiting for placement. Farooq Guerin MD  Cell: [phone]    This note is created with the assistance of a speech recognition program.  While intending to generate a document that actually reflects the content of the visit, the document can still have some errors including those of syntax and sound a like substitutions which may escape proof reading. It such instances, actual meaning can be extrapolated by contextual diversion.

## 2019-09-11 NOTE — PROGRESS NOTES
Occupational Therapy    DATE: 2019    NAME: Godwin Nevarez  MRN: [id number]   : 1945      [facility]  Occupational Therapy Not Seen Note    Patient not available for Occupational Therapy due to:    [] Testing:    [] Hemodialysis    [] Cancelled by RN:    [x]Refusal by Patient: First attempt to see pt for OT at 10:20am, pt with PT, second attempt at 13:41, pt refused at this time due to R LE pain and fatigue and requested writer come back later, 3rd attempt at 14:40, pt asleep and refused due to pain R LE and increased fatigue.   Pt stated \"I know I have to do it but I just don't feel good right now\"    [] Surgery:     [] Intubation:     [] Pain Medication:    [] Sedation:     [] Spine Precautions :    [] Medical Instability:    [] Other:    ELIZABETH Monzon

## 2019-09-11 NOTE — PROGRESS NOTES
[facility] -   Infectious diseases evaluation  admission date 8/25/2019      Impression :   Current:  · E. coli UTI  · Abnormal stress test status post cardiac cath on 7/62/4965 complicated by a right groin hematoma/pseudoaneurysm followed by vascular surgery   · Anemia  · CHF  · Chronic renal failure  · Nephrolithiasis  · Atrial fibrillation  · Hypertension  · Hyperlipidemia        History of penicillin allergy with itching      Recommendations     · P.o. Keflex through 9/11/2019  · Discharge planning in progress      Antimicrobial Stewardship Recommendations   · Simplification of therapy  · Targeted therapy  · IV to oral conversion  · Per Kg dosing  · Restricted antimicrobial use  · Discontinuation of therapy    History of Present Illness:   Initial history:  Kyra Day is a 69y.o.-year-old female admitted on 8/25/2019 with chest pain, found to have abnormal stress test status post cardiac cath on 1/99/1486 complicated by a right groin hematoma with suspected pseudoaneurysm was treated with bedrest and thrombin injection. Interval changes  9/11/2019   She is up to the chair, feeling better today, denied any nausea or vomiting, no abdominal pain, no urinary symptoms, no other complaints. No fever  WBC normal  Hemoglobin 7  Creatinine 1.8      I have personally reviewed the past medical history, past surgical history, medications, social history, and family history, and I haveupdated the database accordingly.   Past Medical History:     Past Medical History:   Diagnosis Date    Acute on chronic diastolic congestive heart failure ([address]     Anesthesia complication     DIFFICULTY WAKING OP    Asthma     Atrial fibrillation ([facility] 2013    Cataracts, bilateral     Cellulitis     BILAT LOWER LEGS-PT STATES IS IMPROVING    Cerebral artery occlusion with cerebral infarction [facility])     Last stroke was February 2017 w/no deficits-TOTAL OF 3    CHF (congestive heart

## 2019-09-12 ENCOUNTER — TELEPHONE (OUTPATIENT)
Dept: ORTHOPEDIC SURGERY | Age: 74
End: 2019-09-12

## 2019-09-13 ENCOUNTER — HOSPITAL ENCOUNTER (OUTPATIENT)
Facility: MEDICAL CENTER | Age: 74
End: 2019-09-13
Payer: COMMERCIAL

## 2019-09-17 ENCOUNTER — HOSPITAL ENCOUNTER (OUTPATIENT)
Dept: INFUSION THERAPY | Facility: MEDICAL CENTER | Age: 74
End: 2019-09-17
Payer: COMMERCIAL

## 2019-09-26 ENCOUNTER — HOSPITAL ENCOUNTER (OUTPATIENT)
Facility: MEDICAL CENTER | Age: 74
End: 2019-09-26
Payer: COMMERCIAL

## 2019-10-01 ENCOUNTER — TELEPHONE (OUTPATIENT)
Dept: ONCOLOGY | Age: 74
End: 2019-10-01

## 2019-10-01 ENCOUNTER — HOSPITAL ENCOUNTER (OUTPATIENT)
Facility: MEDICAL CENTER | Age: 74
Discharge: HOME | End: 2019-10-01
Payer: COMMERCIAL

## 2019-10-01 ENCOUNTER — HOSPITAL ENCOUNTER (OUTPATIENT)
Age: 74
Discharge: HOME | End: 2019-10-01
Payer: COMMERCIAL

## 2019-10-01 ENCOUNTER — HOSPITAL ENCOUNTER (OUTPATIENT)
Dept: INFUSION THERAPY | Facility: MEDICAL CENTER | Age: 74
Discharge: HOME | End: 2019-10-01
Payer: COMMERCIAL

## 2019-10-01 ENCOUNTER — OFFICE VISIT (OUTPATIENT)
Dept: ONCOLOGY | Age: 74
End: 2019-10-01
Payer: COMMERCIAL

## 2019-10-01 VITALS
HEART RATE: 69 BPM | RESPIRATION RATE: 23 BRPM | TEMPERATURE: 98.3 F | OXYGEN SATURATION: 91 % | DIASTOLIC BLOOD PRESSURE: 51 MMHG | SYSTOLIC BLOOD PRESSURE: 141 MMHG

## 2019-10-01 DIAGNOSIS — D63.1 ANEMIA OF CHRONIC RENAL FAILURE, STAGE 4 (SEVERE) (HCC): ICD-10-CM

## 2019-10-01 DIAGNOSIS — N18.4 ANEMIA OF CHRONIC RENAL FAILURE, STAGE 4 (SEVERE) (HCC): Primary | ICD-10-CM

## 2019-10-01 DIAGNOSIS — E61.1 IRON DEFICIENCY: ICD-10-CM

## 2019-10-01 DIAGNOSIS — N18.4 ANEMIA OF CHRONIC RENAL FAILURE, STAGE 4 (SEVERE) (HCC): ICD-10-CM

## 2019-10-01 DIAGNOSIS — N18.4 STAGE 4 CHRONIC KIDNEY DISEASE (HCC): ICD-10-CM

## 2019-10-01 DIAGNOSIS — D53.9 MACROCYTIC ANEMIA: ICD-10-CM

## 2019-10-01 DIAGNOSIS — D64.9 ANEMIA, UNSPECIFIED TYPE: ICD-10-CM

## 2019-10-01 DIAGNOSIS — K90.89 OTHER SPECIFIED INTESTINAL MALABSORPTION: Primary | ICD-10-CM

## 2019-10-01 DIAGNOSIS — K90.89 OTHER SPECIFIED INTESTINAL MALABSORPTION: ICD-10-CM

## 2019-10-01 DIAGNOSIS — D63.1 ANEMIA OF CHRONIC RENAL FAILURE, STAGE 4 (SEVERE) (HCC): Primary | ICD-10-CM

## 2019-10-01 LAB
ABSOLUTE EOS #: 0.17 K/UL (ref 0–0.44)
ABSOLUTE IMMATURE GRANULOCYTE: 0.12 K/UL (ref 0–0.3)
ABSOLUTE LYMPH #: 1.73 K/UL (ref 1.1–3.7)
ABSOLUTE MONO #: 0.84 K/UL (ref 0.1–1.2)
ANION GAP SERPL CALCULATED.3IONS-SCNC: 15 MMOL/L (ref 9–17)
BASOPHILS # BLD: 0 % (ref 0–2)
BASOPHILS ABSOLUTE: 0.04 K/UL (ref 0–0.2)
BUN BLDV-MCNC: 36 MG/DL (ref 8–23)
BUN/CREAT BLD: ABNORMAL (ref 9–20)
CALCIUM SERPL-MCNC: 8.9 MG/DL (ref 8.6–10.4)
CHLORIDE BLD-SCNC: 97 MMOL/L (ref 98–107)
CO2: 31 MMOL/L (ref 20–31)
CREAT SERPL-MCNC: 1.91 MG/DL (ref 0.5–0.9)
DIFFERENTIAL TYPE: ABNORMAL
EOSINOPHILS RELATIVE PERCENT: 2 % (ref 1–4)
GFR AFRICAN AMERICAN: 31 ML/MIN
GFR NON-AFRICAN AMERICAN: 26 ML/MIN
GFR SERPL CREATININE-BSD FRML MDRD: ABNORMAL ML/MIN/{1.73_M2}
GFR SERPL CREATININE-BSD FRML MDRD: ABNORMAL ML/MIN/{1.73_M2}
GLUCOSE BLD-MCNC: 98 MG/DL (ref 70–99)
HCT VFR BLD CALC: 29.1 % (ref 36.3–47.1)
HCT VFR BLD CALC: 29.1 % (ref 36.3–47.1)
HEMOGLOBIN: 8.5 G/DL (ref 11.9–15.1)
HEMOGLOBIN: 8.5 G/DL (ref 11.9–15.1)
IMMATURE GRANULOCYTES: 1 %
LYMPHOCYTES # BLD: 18 % (ref 24–43)
MCH RBC QN AUTO: 31 PG (ref 25.2–33.5)
MCH RBC QN AUTO: 31 PG (ref 25.2–33.5)
MCHC RBC AUTO-ENTMCNC: 29.2 G/DL (ref 28.4–34.8)
MCHC RBC AUTO-ENTMCNC: 29.2 G/DL (ref 28.4–34.8)
MCV RBC AUTO: 106.2 FL (ref 82.6–102.9)
MCV RBC AUTO: 106.2 FL (ref 82.6–102.9)
MONOCYTES # BLD: 9 % (ref 3–12)
NRBC AUTOMATED: 0 PER 100 WBC
NRBC AUTOMATED: 0 PER 100 WBC
PDW BLD-RTO: 17.7 % (ref 11.8–14.4)
PDW BLD-RTO: 17.7 % (ref 11.8–14.4)
PLATELET # BLD: 126 K/UL (ref 138–453)
PLATELET # BLD: 126 K/UL (ref 138–453)
PLATELET ESTIMATE: ABNORMAL
PMV BLD AUTO: 11 FL (ref 8.1–13.5)
PMV BLD AUTO: 11 FL (ref 8.1–13.5)
POTASSIUM SERPL-SCNC: 4.6 MMOL/L (ref 3.7–5.3)
RBC # BLD: 2.74 M/UL (ref 3.95–5.11)
RBC # BLD: 2.74 M/UL (ref 3.95–5.11)
RBC # BLD: ABNORMAL 10*6/UL
SEG NEUTROPHILS: 70 % (ref 36–65)
SEGMENTED NEUTROPHILS ABSOLUTE COUNT: 6.61 K/UL (ref 1.5–8.1)
SODIUM BLD-SCNC: 143 MMOL/L (ref 135–144)
WBC # BLD: 9.5 K/UL (ref 3.5–11.3)
WBC # BLD: 9.5 K/UL (ref 3.5–11.3)
WBC # BLD: ABNORMAL 10*3/UL

## 2019-10-01 PROCEDURE — G8484 FLU IMMUNIZE NO ADMIN: HCPCS | Performed by: INTERNAL MEDICINE

## 2019-10-01 PROCEDURE — 6360000002 HC RX W HCPCS: Performed by: INTERNAL MEDICINE

## 2019-10-01 PROCEDURE — 4040F PNEUMOC VAC/ADMIN/RCVD: CPT | Performed by: INTERNAL MEDICINE

## 2019-10-01 PROCEDURE — 85025 COMPLETE CBC W/AUTO DIFF WBC: CPT

## 2019-10-01 PROCEDURE — 99211 OFF/OP EST MAY X REQ PHY/QHP: CPT | Performed by: INTERNAL MEDICINE

## 2019-10-01 PROCEDURE — G8400 PT W/DXA NO RESULTS DOC: HCPCS | Performed by: INTERNAL MEDICINE

## 2019-10-01 PROCEDURE — 96372 THER/PROPH/DIAG INJ SC/IM: CPT

## 2019-10-01 PROCEDURE — 85027 COMPLETE CBC AUTOMATED: CPT

## 2019-10-01 PROCEDURE — 36415 COLL VENOUS BLD VENIPUNCTURE: CPT

## 2019-10-01 PROCEDURE — 1090F PRES/ABSN URINE INCON ASSESS: CPT | Performed by: INTERNAL MEDICINE

## 2019-10-01 PROCEDURE — 80048 BASIC METABOLIC PNL TOTAL CA: CPT

## 2019-10-01 PROCEDURE — 1111F DSCHRG MED/CURRENT MED MERGE: CPT | Performed by: INTERNAL MEDICINE

## 2019-10-01 PROCEDURE — 1036F TOBACCO NON-USER: CPT | Performed by: INTERNAL MEDICINE

## 2019-10-01 PROCEDURE — 1123F ACP DISCUSS/DSCN MKR DOCD: CPT | Performed by: INTERNAL MEDICINE

## 2019-10-01 PROCEDURE — G8428 CUR MEDS NOT DOCUMENT: HCPCS | Performed by: INTERNAL MEDICINE

## 2019-10-01 PROCEDURE — 99214 OFFICE O/P EST MOD 30 MIN: CPT | Performed by: INTERNAL MEDICINE

## 2019-10-01 PROCEDURE — 3017F COLORECTAL CA SCREEN DOC REV: CPT | Performed by: INTERNAL MEDICINE

## 2019-10-01 PROCEDURE — G8417 CALC BMI ABV UP PARAM F/U: HCPCS | Performed by: INTERNAL MEDICINE

## 2019-10-01 PROCEDURE — G8598 ASA/ANTIPLAT THER USED: HCPCS | Performed by: INTERNAL MEDICINE

## 2019-10-01 RX ORDER — ACETAMINOPHEN 325 MG/1
325 TABLET ORAL
Status: CANCELLED | OUTPATIENT
Start: 2019-10-08

## 2019-10-01 RX ORDER — CYANOCOBALAMIN 1000 UG/ML
1000 INJECTION INTRAMUSCULAR; SUBCUTANEOUS ONCE
Status: COMPLETED
Start: 2019-10-01 | End: 2019-10-01

## 2019-10-01 RX ORDER — CYANOCOBALAMIN 1000 UG/ML
1000 INJECTION INTRAMUSCULAR; SUBCUTANEOUS ONCE
Status: CANCELLED
Start: 2019-10-08

## 2019-10-01 RX ADMIN — CYANOCOBALAMIN 1000 MCG: 1000 INJECTION, SOLUTION INTRAMUSCULAR at 14:52

## 2019-10-01 RX ADMIN — DARBEPOETIN ALFA 200 MCG: 200 INJECTION, SOLUTION INTRAVENOUS; SUBCUTANEOUS at 14:53

## 2019-10-01 NOTE — PROGRESS NOTES
Patient arrive to treatment area via wheelchair having met with physician in front office. Reviewed labs and physician note. Patient tolerate Aranesp injection well; band-aid applied. Patient tolerate B12 injection well; band-aid applied. Patient off unit per wheelchair at discharge; AVS to be provided by front office staff.

## 2019-10-17 ENCOUNTER — HOSPITAL ENCOUNTER (OUTPATIENT)
Facility: MEDICAL CENTER | Age: 74
End: 2019-10-17

## 2019-10-18 ENCOUNTER — TELEPHONE (OUTPATIENT)
Dept: ONCOLOGY | Age: 74
End: 2019-10-18

## 2019-10-22 ENCOUNTER — APPOINTMENT (OUTPATIENT)
Dept: INFUSION THERAPY | Facility: MEDICAL CENTER | Age: 74
End: 2019-10-22
Payer: COMMERCIAL

## 2019-10-24 ENCOUNTER — HOSPITAL ENCOUNTER (OUTPATIENT)
Dept: INFUSION THERAPY | Facility: MEDICAL CENTER | Age: 74
Discharge: HOME | End: 2019-10-24
Payer: COMMERCIAL

## 2019-10-24 ENCOUNTER — HOSPITAL ENCOUNTER (OUTPATIENT)
Facility: MEDICAL CENTER | Age: 74
Discharge: HOME | End: 2019-10-24
Payer: COMMERCIAL

## 2019-10-24 ENCOUNTER — HOSPITAL ENCOUNTER (OUTPATIENT)
Dept: VASCULAR LAB | Age: 74
Discharge: HOME | End: 2019-10-24
Payer: COMMERCIAL

## 2019-10-24 VITALS
SYSTOLIC BLOOD PRESSURE: 136 MMHG | HEART RATE: 66 BPM | DIASTOLIC BLOOD PRESSURE: 61 MMHG | TEMPERATURE: 98.2 F | RESPIRATION RATE: 22 BRPM

## 2019-10-24 DIAGNOSIS — E61.1 IRON DEFICIENCY: ICD-10-CM

## 2019-10-24 DIAGNOSIS — M79.89 RIGHT LEG SWELLING: Primary | ICD-10-CM

## 2019-10-24 DIAGNOSIS — D64.9 ANEMIA, UNSPECIFIED TYPE: ICD-10-CM

## 2019-10-24 DIAGNOSIS — K90.89 OTHER SPECIFIED INTESTINAL MALABSORPTION: Primary | ICD-10-CM

## 2019-10-24 DIAGNOSIS — D63.1 ANEMIA OF CHRONIC RENAL FAILURE, STAGE 4 (SEVERE) (HCC): ICD-10-CM

## 2019-10-24 DIAGNOSIS — K90.89 OTHER SPECIFIED INTESTINAL MALABSORPTION: ICD-10-CM

## 2019-10-24 DIAGNOSIS — N18.4 ANEMIA OF CHRONIC RENAL FAILURE, STAGE 4 (SEVERE) (HCC): ICD-10-CM

## 2019-10-24 LAB
ABSOLUTE EOS #: 0.11 K/UL (ref 0–0.44)
ABSOLUTE IMMATURE GRANULOCYTE: 0.06 K/UL (ref 0–0.3)
ABSOLUTE LYMPH #: 1.25 K/UL (ref 1.1–3.7)
ABSOLUTE MONO #: 0.69 K/UL (ref 0.1–1.2)
BASOPHILS # BLD: 1 % (ref 0–2)
BASOPHILS ABSOLUTE: 0.05 K/UL (ref 0–0.2)
DIFFERENTIAL TYPE: ABNORMAL
EOSINOPHILS RELATIVE PERCENT: 1 % (ref 1–4)
HCT VFR BLD CALC: 28 % (ref 36.3–47.1)
HEMOGLOBIN: 8.2 G/DL (ref 11.9–15.1)
IMMATURE GRANULOCYTES: 1 %
LYMPHOCYTES # BLD: 14 % (ref 24–43)
MCH RBC QN AUTO: 31.8 PG (ref 25.2–33.5)
MCHC RBC AUTO-ENTMCNC: 29.3 G/DL (ref 28.4–34.8)
MCV RBC AUTO: 108.5 FL (ref 82.6–102.9)
MONOCYTES # BLD: 8 % (ref 3–12)
NRBC AUTOMATED: 0 PER 100 WBC
PDW BLD-RTO: 18.5 % (ref 11.8–14.4)
PLATELET # BLD: 157 K/UL (ref 138–453)
PLATELET ESTIMATE: ABNORMAL
PMV BLD AUTO: 10.3 FL (ref 8.1–13.5)
RBC # BLD: 2.58 M/UL (ref 3.95–5.11)
RBC # BLD: ABNORMAL 10*6/UL
SEG NEUTROPHILS: 75 % (ref 36–65)
SEGMENTED NEUTROPHILS ABSOLUTE COUNT: 7.06 K/UL (ref 1.5–8.1)
WBC # BLD: 9.2 K/UL (ref 3.5–11.3)
WBC # BLD: ABNORMAL 10*3/UL

## 2019-10-24 PROCEDURE — 93926 LOWER EXTREMITY STUDY: CPT

## 2019-10-24 PROCEDURE — 96372 THER/PROPH/DIAG INJ SC/IM: CPT

## 2019-10-24 PROCEDURE — 36415 COLL VENOUS BLD VENIPUNCTURE: CPT

## 2019-10-24 PROCEDURE — 6360000002 HC RX W HCPCS: Performed by: INTERNAL MEDICINE

## 2019-10-24 PROCEDURE — 85025 COMPLETE CBC W/AUTO DIFF WBC: CPT

## 2019-10-24 RX ORDER — CYANOCOBALAMIN 1000 UG/ML
1000 INJECTION INTRAMUSCULAR; SUBCUTANEOUS ONCE
Status: CANCELLED
Start: 2019-10-29

## 2019-10-24 RX ADMIN — DARBEPOETIN ALFA 200 MCG: 200 INJECTION, SOLUTION INTRAVENOUS; SUBCUTANEOUS at 10:00

## 2019-10-24 NOTE — PROGRESS NOTES
Patient here for labs and Aranesp injection if needed. Labs reviewed. Hemoglobin =8.2. Vitals obtained. Aranesp given per [facility], band aid to site. Has a return visit scheduled. Discharged ambulatory with family.

## 2019-10-25 ENCOUNTER — HOSPITAL ENCOUNTER (EMERGENCY)
Age: 74
Discharge: HOME | End: 2019-10-25
Attending: EMERGENCY MEDICINE
Payer: COMMERCIAL

## 2019-10-25 VITALS
TEMPERATURE: 97.4 F | OXYGEN SATURATION: 94 % | HEIGHT: 60 IN | BODY MASS INDEX: 36.32 KG/M2 | WEIGHT: 185 LBS | RESPIRATION RATE: 20 BRPM | SYSTOLIC BLOOD PRESSURE: 141 MMHG | DIASTOLIC BLOOD PRESSURE: 73 MMHG | HEART RATE: 68 BPM

## 2019-10-25 DIAGNOSIS — N30.01 ACUTE CYSTITIS WITH HEMATURIA: Primary | ICD-10-CM

## 2019-10-25 LAB
-: ABNORMAL
ABSOLUTE EOS #: 0.16 K/UL (ref 0–0.4)
ABSOLUTE IMMATURE GRANULOCYTE: 0.08 K/UL (ref 0–0.3)
ABSOLUTE LYMPH #: 1.34 K/UL (ref 1–4.8)
ABSOLUTE MONO #: 0.63 K/UL (ref 0.2–0.8)
AMORPHOUS: ABNORMAL
ANION GAP SERPL CALCULATED.3IONS-SCNC: 13 MMOL/L (ref 9–17)
BACTERIA: ABNORMAL
BASOPHILS # BLD: 1 %
BASOPHILS ABSOLUTE: 0.08 K/UL (ref 0–0.2)
BILIRUBIN URINE: NEGATIVE
BUN BLDV-MCNC: 34 MG/DL (ref 8–23)
BUN/CREAT BLD: 20 (ref 9–20)
CALCIUM SERPL-MCNC: 9.2 MG/DL (ref 8.6–10.4)
CASTS UA: ABNORMAL /LPF
CHLORIDE BLD-SCNC: 98 MMOL/L (ref 98–107)
CO2: 32 MMOL/L (ref 20–31)
COLOR: YELLOW
COMMENT UA: ABNORMAL
CREAT SERPL-MCNC: 1.71 MG/DL (ref 0.5–0.9)
CRYSTALS, UA: ABNORMAL /HPF
DIFFERENTIAL TYPE: ABNORMAL
EOSINOPHILS RELATIVE PERCENT: 2 % (ref 1–4)
EPITHELIAL CELLS UA: ABNORMAL /HPF (ref 0–5)
GFR AFRICAN AMERICAN: 36 ML/MIN
GFR NON-AFRICAN AMERICAN: 29 ML/MIN
GFR SERPL CREATININE-BSD FRML MDRD: ABNORMAL ML/MIN/{1.73_M2}
GFR SERPL CREATININE-BSD FRML MDRD: ABNORMAL ML/MIN/{1.73_M2}
GLUCOSE BLD-MCNC: 132 MG/DL (ref 70–99)
GLUCOSE URINE: NEGATIVE
HCT VFR BLD CALC: 28.9 % (ref 36.3–47.1)
HEMOGLOBIN: 8.3 G/DL (ref 11.9–15.1)
IMMATURE GRANULOCYTES: 1 %
KETONES, URINE: NEGATIVE
LACTIC ACID: 2 MMOL/L (ref 0.5–2.2)
LEUKOCYTE ESTERASE, URINE: ABNORMAL
LYMPHOCYTES # BLD: 17 % (ref 24–44)
MCH RBC QN AUTO: 32.3 PG (ref 25.2–33.5)
MCHC RBC AUTO-ENTMCNC: 28.7 G/DL (ref 28.4–34.8)
MCV RBC AUTO: 112.5 FL (ref 82.6–102.9)
MONOCYTES # BLD: 8 % (ref 1–7)
MORPHOLOGY: ABNORMAL
MORPHOLOGY: ABNORMAL
MUCUS: ABNORMAL
NITRITE, URINE: POSITIVE
NRBC AUTOMATED: 0 PER 100 WBC
OTHER OBSERVATIONS UA: ABNORMAL
PDW BLD-RTO: 18.5 % (ref 11.8–14.4)
PH UA: 6 (ref 5–8)
PLATELET # BLD: 132 K/UL (ref 138–453)
PLATELET ESTIMATE: ABNORMAL
PMV BLD AUTO: 10.6 FL (ref 8.1–13.5)
POTASSIUM SERPL-SCNC: 4.3 MMOL/L (ref 3.7–5.3)
PROTEIN UA: ABNORMAL
RBC # BLD: 2.57 M/UL (ref 3.95–5.11)
RBC # BLD: ABNORMAL 10*6/UL
RBC UA: ABNORMAL /HPF (ref 0–2)
RENAL EPITHELIAL, UA: ABNORMAL /HPF
SEG NEUTROPHILS: 71 % (ref 36–66)
SEGMENTED NEUTROPHILS ABSOLUTE COUNT: 5.61 K/UL (ref 1.8–7.7)
SODIUM BLD-SCNC: 143 MMOL/L (ref 135–144)
SPECIFIC GRAVITY UA: 1.02 (ref 1–1.03)
TRICHOMONAS: ABNORMAL
TURBIDITY: ABNORMAL
URINE HGB: ABNORMAL
UROBILINOGEN, URINE: NORMAL
WBC # BLD: 7.9 K/UL (ref 3.5–11.3)
WBC # BLD: ABNORMAL 10*3/UL
WBC UA: ABNORMAL /HPF (ref 0–5)
YEAST: ABNORMAL

## 2019-10-25 PROCEDURE — 87186 SC STD MICRODIL/AGAR DIL: CPT

## 2019-10-25 PROCEDURE — 87088 URINE BACTERIA CULTURE: CPT

## 2019-10-25 PROCEDURE — 83605 ASSAY OF LACTIC ACID: CPT

## 2019-10-25 PROCEDURE — 87040 BLOOD CULTURE FOR BACTERIA: CPT

## 2019-10-25 PROCEDURE — 6360000002 HC RX W HCPCS: Performed by: EMERGENCY MEDICINE

## 2019-10-25 PROCEDURE — 85025 COMPLETE CBC W/AUTO DIFF WBC: CPT

## 2019-10-25 PROCEDURE — 99283 EMERGENCY DEPT VISIT LOW MDM: CPT

## 2019-10-25 PROCEDURE — 96365 THER/PROPH/DIAG IV INF INIT: CPT

## 2019-10-25 PROCEDURE — 80048 BASIC METABOLIC PNL TOTAL CA: CPT

## 2019-10-25 PROCEDURE — 87086 URINE CULTURE/COLONY COUNT: CPT

## 2019-10-25 PROCEDURE — 2580000003 HC RX 258: Performed by: EMERGENCY MEDICINE

## 2019-10-25 PROCEDURE — 81001 URINALYSIS AUTO W/SCOPE: CPT

## 2019-10-25 RX ORDER — CEPHALEXIN 500 MG/1
500 CAPSULE ORAL 2 TIMES DAILY
Qty: 14 CAPSULE | Refills: 0 | Status: SHIPPED | OUTPATIENT
Start: 2019-10-25 | End: 2019-10-25 | Stop reason: SDUPTHER

## 2019-10-25 RX ORDER — CEPHALEXIN 500 MG/1
500 CAPSULE ORAL 2 TIMES DAILY
Qty: 14 CAPSULE | Refills: 0 | Status: SHIPPED | OUTPATIENT
Start: 2019-10-25 | End: 2019-11-01

## 2019-10-25 RX ORDER — ONDANSETRON 4 MG/1
4 TABLET, ORALLY DISINTEGRATING ORAL 3 TIMES DAILY PRN
Qty: 10 TABLET | Refills: 0 | Status: ON HOLD | OUTPATIENT
Start: 2019-10-25 | End: 2019-12-06 | Stop reason: ALTCHOICE

## 2019-10-25 RX ORDER — 0.9 % SODIUM CHLORIDE 0.9 %
500 INTRAVENOUS SOLUTION INTRAVENOUS ONCE
Status: COMPLETED | OUTPATIENT
Start: 2019-10-25 | End: 2019-10-25

## 2019-10-25 RX ADMIN — SODIUM CHLORIDE 500 ML: 9 INJECTION, SOLUTION INTRAVENOUS at 15:25

## 2019-10-25 RX ADMIN — CEFTRIAXONE SODIUM 1 G: 1 INJECTION, POWDER, FOR SOLUTION INTRAMUSCULAR; INTRAVENOUS at 15:30

## 2019-10-25 ASSESSMENT — PAIN DESCRIPTION - ORIENTATION: ORIENTATION: LOWER

## 2019-10-25 ASSESSMENT — PAIN DESCRIPTION - PAIN TYPE: TYPE: ACUTE PAIN

## 2019-10-25 ASSESSMENT — PAIN DESCRIPTION - DESCRIPTORS: DESCRIPTORS: PRESSURE

## 2019-10-25 ASSESSMENT — PAIN DESCRIPTION - LOCATION: LOCATION: ABDOMEN

## 2019-10-25 ASSESSMENT — PAIN SCALES - GENERAL: PAINLEVEL_OUTOF10: 7

## 2019-10-27 LAB
CULTURE: ABNORMAL
Lab: ABNORMAL
SPECIMEN DESCRIPTION: ABNORMAL

## 2019-10-31 LAB
CULTURE: NORMAL
CULTURE: NORMAL
Lab: NORMAL
Lab: NORMAL
SPECIMEN DESCRIPTION: NORMAL
SPECIMEN DESCRIPTION: NORMAL

## 2019-11-14 ENCOUNTER — HOSPITAL ENCOUNTER (OUTPATIENT)
Dept: INFUSION THERAPY | Facility: MEDICAL CENTER | Age: 74
Discharge: HOME | End: 2019-11-14
Payer: COMMERCIAL

## 2019-11-14 ENCOUNTER — HOSPITAL ENCOUNTER (OUTPATIENT)
Facility: MEDICAL CENTER | Age: 74
Discharge: HOME | End: 2019-11-14
Payer: COMMERCIAL

## 2019-11-14 VITALS
HEART RATE: 71 BPM | RESPIRATION RATE: 24 BRPM | TEMPERATURE: 98.4 F | DIASTOLIC BLOOD PRESSURE: 47 MMHG | SYSTOLIC BLOOD PRESSURE: 121 MMHG

## 2019-11-14 DIAGNOSIS — E61.1 IRON DEFICIENCY: ICD-10-CM

## 2019-11-14 DIAGNOSIS — D64.9 ANEMIA, UNSPECIFIED TYPE: ICD-10-CM

## 2019-11-14 DIAGNOSIS — K90.89 OTHER SPECIFIED INTESTINAL MALABSORPTION: Primary | ICD-10-CM

## 2019-11-14 DIAGNOSIS — K90.89 OTHER SPECIFIED INTESTINAL MALABSORPTION: ICD-10-CM

## 2019-11-14 DIAGNOSIS — D63.1 ANEMIA OF CHRONIC RENAL FAILURE, STAGE 4 (SEVERE) (HCC): ICD-10-CM

## 2019-11-14 DIAGNOSIS — N18.4 ANEMIA OF CHRONIC RENAL FAILURE, STAGE 4 (SEVERE) (HCC): ICD-10-CM

## 2019-11-14 LAB
ABSOLUTE EOS #: 0.13 K/UL (ref 0–0.44)
ABSOLUTE IMMATURE GRANULOCYTE: 0.05 K/UL (ref 0–0.3)
ABSOLUTE LYMPH #: 1.19 K/UL (ref 1.1–3.7)
ABSOLUTE MONO #: 0.66 K/UL (ref 0.1–1.2)
BASOPHILS # BLD: 1 % (ref 0–2)
BASOPHILS ABSOLUTE: 0.05 K/UL (ref 0–0.2)
DIFFERENTIAL TYPE: ABNORMAL
EOSINOPHILS RELATIVE PERCENT: 2 % (ref 1–4)
HCT VFR BLD CALC: 32.5 % (ref 36.3–47.1)
HEMOGLOBIN: 9.7 G/DL (ref 11.9–15.1)
IMMATURE GRANULOCYTES: 1 %
LYMPHOCYTES # BLD: 14 % (ref 24–43)
MCH RBC QN AUTO: 32.6 PG (ref 25.2–33.5)
MCHC RBC AUTO-ENTMCNC: 29.8 G/DL (ref 28.4–34.8)
MCV RBC AUTO: 109.1 FL (ref 82.6–102.9)
MONOCYTES # BLD: 8 % (ref 3–12)
NRBC AUTOMATED: 0 PER 100 WBC
PDW BLD-RTO: 16.1 % (ref 11.8–14.4)
PLATELET # BLD: 131 K/UL (ref 138–453)
PLATELET ESTIMATE: ABNORMAL
PMV BLD AUTO: 10.4 FL (ref 8.1–13.5)
RBC # BLD: 2.98 M/UL (ref 3.95–5.11)
RBC # BLD: ABNORMAL 10*6/UL
SEG NEUTROPHILS: 74 % (ref 36–65)
SEGMENTED NEUTROPHILS ABSOLUTE COUNT: 6.65 K/UL (ref 1.5–8.1)
WBC # BLD: 8.7 K/UL (ref 3.5–11.3)
WBC # BLD: ABNORMAL 10*3/UL

## 2019-11-14 PROCEDURE — 85025 COMPLETE CBC W/AUTO DIFF WBC: CPT

## 2019-11-14 PROCEDURE — 36415 COLL VENOUS BLD VENIPUNCTURE: CPT

## 2019-11-14 PROCEDURE — 6360000002 HC RX W HCPCS: Performed by: INTERNAL MEDICINE

## 2019-11-14 PROCEDURE — 96372 THER/PROPH/DIAG INJ SC/IM: CPT

## 2019-11-14 RX ORDER — CYANOCOBALAMIN 1000 UG/ML
1000 INJECTION INTRAMUSCULAR; SUBCUTANEOUS ONCE
Status: COMPLETED
Start: 2019-11-14 | End: 2019-11-14

## 2019-11-14 RX ORDER — CYANOCOBALAMIN 1000 UG/ML
1000 INJECTION INTRAMUSCULAR; SUBCUTANEOUS ONCE
Status: CANCELLED
Start: 2019-11-26

## 2019-11-14 RX ADMIN — DARBEPOETIN ALFA 200 MCG: 200 INJECTION, SOLUTION INTRAVENOUS; SUBCUTANEOUS at 11:03

## 2019-11-14 RX ADMIN — CYANOCOBALAMIN 1000 MCG: 1000 INJECTION, SOLUTION INTRAMUSCULAR at 11:05

## 2019-11-14 NOTE — PROGRESS NOTES
Charlotte Locke here per ambulatory with roller walker, pt has portable oxygen on also, and she is short of breath on exertion. Obtained hemoglobin of 9.7. Pt is here for aranesp and vitamin B12. Aranesp 200 micrograms to abdomen and vitamin B12 to upper left arm SQ, without difficulty. Pt back on 12/5/19 for next lab work.

## 2019-11-24 NOTE — PLAN OF CARE
Problem: Risk for Impaired Skin Integrity  Goal: Tissue integrity - skin and mucous membranes  Structural intactness and normal physiological function of skin and  mucous membranes. Outcome: Ongoing  No changes. Continue to monitor. Intervention: TURN PATIENT  Patient able to turn self. Problem: Falls - Risk of:  Goal: Will remain free from falls  Will remain free from falls   Outcome: Ongoing  Patient free from falls. Call light within reach. Non slip footwear. Patient education. Bed low and locked. Continue to monitor. Goal: Absence of physical injury  Absence of physical injury   Outcome: Ongoing  Patient free from physical injury. Continue to monitor. Problem: Pain:  Goal: Pain level will decrease  Pain level will decrease   Outcome: Ongoing  Patient reporting pain. PRN pain medication provided. Continue to monitor.    Goal: Control of acute pain  Control of acute pain   Outcome: Ongoing    Goal: Control of chronic pain  Control of chronic pain   Outcome: Ongoing Scribe Attestation (For Scribes USE Only)... Scribe Attestation (For Scribes USE Only).../Attending Attestation (For Attendings USE Only)...

## 2019-12-05 ENCOUNTER — HOSPITAL ENCOUNTER (INPATIENT)
Age: 74
LOS: 5 days | Discharge: HOME HEALTH | DRG: 690 | End: 2019-12-10
Attending: EMERGENCY MEDICINE | Admitting: INTERNAL MEDICINE
Payer: COMMERCIAL

## 2019-12-05 DIAGNOSIS — N30.01 ACUTE CYSTITIS WITH HEMATURIA: Primary | ICD-10-CM

## 2019-12-05 PROBLEM — R31.9 HEMATURIA: Status: ACTIVE | Noted: 2019-12-05

## 2019-12-05 LAB
-: ABNORMAL
ABSOLUTE EOS #: 0.16 K/UL (ref 0–0.44)
ABSOLUTE IMMATURE GRANULOCYTE: 0.07 K/UL (ref 0–0.3)
ABSOLUTE LYMPH #: 1.3 K/UL (ref 1.1–3.7)
ABSOLUTE MONO #: 0.63 K/UL (ref 0.1–1.2)
AMORPHOUS: ABNORMAL
ANION GAP SERPL CALCULATED.3IONS-SCNC: 13 MMOL/L (ref 9–17)
BACTERIA: ABNORMAL
BASOPHILS # BLD: 0 % (ref 0–2)
BASOPHILS ABSOLUTE: <0.03 K/UL (ref 0–0.2)
BILIRUBIN URINE: NEGATIVE
BUN BLDV-MCNC: 30 MG/DL (ref 8–23)
BUN/CREAT BLD: 17 (ref 9–20)
CALCIUM SERPL-MCNC: 8.9 MG/DL (ref 8.6–10.4)
CASTS UA: ABNORMAL /LPF
CHLORIDE BLD-SCNC: 97 MMOL/L (ref 98–107)
CO2: 31 MMOL/L (ref 20–31)
COLOR: ABNORMAL
COMMENT UA: ABNORMAL
CREAT SERPL-MCNC: 1.72 MG/DL (ref 0.5–0.9)
CRYSTALS, UA: ABNORMAL /HPF
DIFFERENTIAL TYPE: ABNORMAL
EOSINOPHILS RELATIVE PERCENT: 2 % (ref 1–4)
EPITHELIAL CELLS UA: ABNORMAL /HPF (ref 0–5)
GFR AFRICAN AMERICAN: 35 ML/MIN
GFR NON-AFRICAN AMERICAN: 29 ML/MIN
GFR SERPL CREATININE-BSD FRML MDRD: ABNORMAL ML/MIN/{1.73_M2}
GFR SERPL CREATININE-BSD FRML MDRD: ABNORMAL ML/MIN/{1.73_M2}
GLUCOSE BLD-MCNC: 81 MG/DL (ref 70–99)
GLUCOSE URINE: NEGATIVE
HCT VFR BLD CALC: 32.2 % (ref 36.3–47.1)
HEMOGLOBIN: 9.6 G/DL (ref 11.9–15.1)
IMMATURE GRANULOCYTES: 1 %
KETONES, URINE: NEGATIVE
LACTIC ACID: 0.6 MMOL/L (ref 0.5–2.2)
LEUKOCYTE ESTERASE, URINE: ABNORMAL
LYMPHOCYTES # BLD: 17 % (ref 24–43)
MCH RBC QN AUTO: 32.3 PG (ref 25.2–33.5)
MCHC RBC AUTO-ENTMCNC: 29.8 G/DL (ref 28.4–34.8)
MCV RBC AUTO: 108.4 FL (ref 82.6–102.9)
MONOCYTES # BLD: 9 % (ref 3–12)
MUCUS: ABNORMAL
NITRITE, URINE: POSITIVE
NRBC AUTOMATED: 0 PER 100 WBC
OTHER OBSERVATIONS UA: ABNORMAL
PDW BLD-RTO: 14.9 % (ref 11.8–14.4)
PH UA: 6.5 (ref 5–8)
PLATELET # BLD: 120 K/UL (ref 138–453)
PLATELET ESTIMATE: ABNORMAL
PMV BLD AUTO: 10.9 FL (ref 8.1–13.5)
POTASSIUM SERPL-SCNC: 4.1 MMOL/L (ref 3.7–5.3)
PROTEIN UA: ABNORMAL
RBC # BLD: 2.97 M/UL (ref 3.95–5.11)
RBC # BLD: ABNORMAL 10*6/UL
RBC UA: ABNORMAL /HPF (ref 0–2)
RENAL EPITHELIAL, UA: ABNORMAL /HPF
SEG NEUTROPHILS: 71 % (ref 36–65)
SEGMENTED NEUTROPHILS ABSOLUTE COUNT: 5.27 K/UL (ref 1.5–8.1)
SODIUM BLD-SCNC: 141 MMOL/L (ref 135–144)
SPECIFIC GRAVITY UA: 1.02 (ref 1–1.03)
TRICHOMONAS: ABNORMAL
TURBIDITY: ABNORMAL
URINE HGB: ABNORMAL
UROBILINOGEN, URINE: NORMAL
WBC # BLD: 7.5 K/UL (ref 3.5–11.3)
WBC # BLD: ABNORMAL 10*3/UL
WBC UA: ABNORMAL /HPF (ref 0–5)
YEAST: ABNORMAL

## 2019-12-05 PROCEDURE — 96375 TX/PRO/DX INJ NEW DRUG ADDON: CPT

## 2019-12-05 PROCEDURE — 87086 URINE CULTURE/COLONY COUNT: CPT

## 2019-12-05 PROCEDURE — 36415 COLL VENOUS BLD VENIPUNCTURE: CPT

## 2019-12-05 PROCEDURE — 6360000002 HC RX W HCPCS: Performed by: NURSE PRACTITIONER

## 2019-12-05 PROCEDURE — 2580000003 HC RX 258: Performed by: EMERGENCY MEDICINE

## 2019-12-05 PROCEDURE — 83605 ASSAY OF LACTIC ACID: CPT

## 2019-12-05 PROCEDURE — 96374 THER/PROPH/DIAG INJ IV PUSH: CPT

## 2019-12-05 PROCEDURE — 87088 URINE BACTERIA CULTURE: CPT

## 2019-12-05 PROCEDURE — 81001 URINALYSIS AUTO W/SCOPE: CPT

## 2019-12-05 PROCEDURE — 2060000000 HC ICU INTERMEDIATE R&B

## 2019-12-05 PROCEDURE — 99284 EMERGENCY DEPT VISIT MOD MDM: CPT

## 2019-12-05 PROCEDURE — 6360000002 HC RX W HCPCS: Performed by: EMERGENCY MEDICINE

## 2019-12-05 PROCEDURE — 87040 BLOOD CULTURE FOR BACTERIA: CPT

## 2019-12-05 PROCEDURE — 80048 BASIC METABOLIC PNL TOTAL CA: CPT

## 2019-12-05 PROCEDURE — 87186 SC STD MICRODIL/AGAR DIL: CPT

## 2019-12-05 PROCEDURE — 85025 COMPLETE CBC W/AUTO DIFF WBC: CPT

## 2019-12-05 PROCEDURE — 83036 HEMOGLOBIN GLYCOSYLATED A1C: CPT

## 2019-12-05 PROCEDURE — 2500000003 HC RX 250 WO HCPCS: Performed by: INTERNAL MEDICINE

## 2019-12-05 RX ORDER — SENNA PLUS 8.6 MG/1
2 TABLET ORAL NIGHTLY
Status: DISCONTINUED | OUTPATIENT
Start: 2019-12-05 | End: 2019-12-10 | Stop reason: HOSPADM

## 2019-12-05 RX ORDER — ROPINIROLE 2 MG/1
2 TABLET, FILM COATED ORAL 3 TIMES DAILY
Status: DISCONTINUED | OUTPATIENT
Start: 2019-12-05 | End: 2019-12-10 | Stop reason: HOSPADM

## 2019-12-05 RX ORDER — PANTOPRAZOLE SODIUM 40 MG/1
40 TABLET, DELAYED RELEASE ORAL
Status: DISCONTINUED | OUTPATIENT
Start: 2019-12-06 | End: 2019-12-10 | Stop reason: HOSPADM

## 2019-12-05 RX ORDER — ONDANSETRON 2 MG/ML
4 INJECTION INTRAMUSCULAR; INTRAVENOUS ONCE
Status: COMPLETED | OUTPATIENT
Start: 2019-12-05 | End: 2019-12-05

## 2019-12-05 RX ORDER — ATORVASTATIN CALCIUM 40 MG/1
40 TABLET, FILM COATED ORAL NIGHTLY
Status: DISCONTINUED | OUTPATIENT
Start: 2019-12-05 | End: 2019-12-10 | Stop reason: HOSPADM

## 2019-12-05 RX ORDER — DEXTROSE MONOHYDRATE 50 MG/ML
100 INJECTION, SOLUTION INTRAVENOUS PRN
Status: DISCONTINUED | OUTPATIENT
Start: 2019-12-05 | End: 2019-12-10 | Stop reason: HOSPADM

## 2019-12-05 RX ORDER — DEXTROSE MONOHYDRATE 25 G/50ML
12.5 INJECTION, SOLUTION INTRAVENOUS PRN
Status: DISCONTINUED | OUTPATIENT
Start: 2019-12-05 | End: 2019-12-10 | Stop reason: HOSPADM

## 2019-12-05 RX ORDER — NICOTINE POLACRILEX 4 MG
15 LOZENGE BUCCAL PRN
Status: DISCONTINUED | OUTPATIENT
Start: 2019-12-05 | End: 2019-12-10 | Stop reason: HOSPADM

## 2019-12-05 RX ORDER — SODIUM CHLORIDE 0.9 % (FLUSH) 0.9 %
10 SYRINGE (ML) INJECTION EVERY 12 HOURS SCHEDULED
Status: DISCONTINUED | OUTPATIENT
Start: 2019-12-05 | End: 2019-12-10 | Stop reason: HOSPADM

## 2019-12-05 RX ORDER — IPRATROPIUM BROMIDE AND ALBUTEROL SULFATE 2.5; .5 MG/3ML; MG/3ML
3 SOLUTION RESPIRATORY (INHALATION) 3 TIMES DAILY
Status: DISCONTINUED | OUTPATIENT
Start: 2019-12-05 | End: 2019-12-05

## 2019-12-05 RX ORDER — CALCITRIOL 0.25 UG/1
0.25 CAPSULE, LIQUID FILLED ORAL DAILY
Status: DISCONTINUED | OUTPATIENT
Start: 2019-12-06 | End: 2019-12-10 | Stop reason: HOSPADM

## 2019-12-05 RX ORDER — CARBIDOPA AND LEVODOPA 25; 100 MG/1; MG/1
1 TABLET, EXTENDED RELEASE ORAL 4 TIMES DAILY
Status: DISCONTINUED | OUTPATIENT
Start: 2019-12-05 | End: 2019-12-10 | Stop reason: HOSPADM

## 2019-12-05 RX ORDER — ONDANSETRON 4 MG/1
4 TABLET, ORALLY DISINTEGRATING ORAL EVERY 6 HOURS PRN
Status: DISCONTINUED | OUTPATIENT
Start: 2019-12-05 | End: 2019-12-10 | Stop reason: HOSPADM

## 2019-12-05 RX ORDER — ONDANSETRON 2 MG/ML
4 INJECTION INTRAMUSCULAR; INTRAVENOUS EVERY 6 HOURS PRN
Status: DISCONTINUED | OUTPATIENT
Start: 2019-12-05 | End: 2019-12-10 | Stop reason: HOSPADM

## 2019-12-05 RX ORDER — ACETAMINOPHEN 500 MG
500 TABLET ORAL EVERY 6 HOURS PRN
Status: DISCONTINUED | OUTPATIENT
Start: 2019-12-05 | End: 2019-12-10 | Stop reason: HOSPADM

## 2019-12-05 RX ORDER — ALBUTEROL SULFATE 2.5 MG/3ML
2.5 SOLUTION RESPIRATORY (INHALATION) 4 TIMES DAILY
Status: DISCONTINUED | OUTPATIENT
Start: 2019-12-05 | End: 2019-12-10 | Stop reason: HOSPADM

## 2019-12-05 RX ORDER — ONDANSETRON 2 MG/ML
4 INJECTION INTRAMUSCULAR; INTRAVENOUS EVERY 6 HOURS PRN
Status: DISCONTINUED | OUTPATIENT
Start: 2019-12-05 | End: 2019-12-05

## 2019-12-05 RX ORDER — FUROSEMIDE 20 MG/1
20 TABLET ORAL DAILY
Status: DISCONTINUED | OUTPATIENT
Start: 2019-12-06 | End: 2019-12-10 | Stop reason: HOSPADM

## 2019-12-05 RX ORDER — ASPIRIN 81 MG/1
81 TABLET ORAL DAILY
Status: DISCONTINUED | OUTPATIENT
Start: 2019-12-06 | End: 2019-12-10 | Stop reason: HOSPADM

## 2019-12-05 RX ORDER — SODIUM CHLORIDE 0.9 % (FLUSH) 0.9 %
10 SYRINGE (ML) INJECTION PRN
Status: DISCONTINUED | OUTPATIENT
Start: 2019-12-05 | End: 2019-12-10 | Stop reason: HOSPADM

## 2019-12-05 RX ORDER — RASAGILINE 0.5 MG/1
1 TABLET ORAL DAILY
Status: DISCONTINUED | OUTPATIENT
Start: 2019-12-06 | End: 2019-12-10 | Stop reason: HOSPADM

## 2019-12-05 RX ORDER — LANOLIN ALCOHOL/MO/W.PET/CERES
3 CREAM (GRAM) TOPICAL NIGHTLY PRN
Status: DISCONTINUED | OUTPATIENT
Start: 2019-12-05 | End: 2019-12-10 | Stop reason: HOSPADM

## 2019-12-05 RX ORDER — AMIODARONE HYDROCHLORIDE 200 MG/1
200 TABLET ORAL DAILY
Status: DISCONTINUED | OUTPATIENT
Start: 2019-12-06 | End: 2019-12-10 | Stop reason: HOSPADM

## 2019-12-05 RX ORDER — CYCLOBENZAPRINE HCL 10 MG
5 TABLET ORAL 3 TIMES DAILY PRN
Status: DISCONTINUED | OUTPATIENT
Start: 2019-12-05 | End: 2019-12-10 | Stop reason: HOSPADM

## 2019-12-05 RX ORDER — MORPHINE SULFATE 2 MG/ML
2 INJECTION, SOLUTION INTRAMUSCULAR; INTRAVENOUS ONCE
Status: COMPLETED | OUTPATIENT
Start: 2019-12-05 | End: 2019-12-05

## 2019-12-05 RX ADMIN — MORPHINE SULFATE 2 MG: 2 INJECTION, SOLUTION INTRAMUSCULAR; INTRAVENOUS at 19:33

## 2019-12-05 RX ADMIN — ANTI-FUNGAL POWDER MICONAZOLE NITRATE TALC FREE: 1.42 POWDER TOPICAL at 23:36

## 2019-12-05 RX ADMIN — ONDANSETRON 4 MG: 2 INJECTION INTRAMUSCULAR; INTRAVENOUS at 19:33

## 2019-12-05 RX ADMIN — CEFTRIAXONE SODIUM 1 G: 1 INJECTION, POWDER, FOR SOLUTION INTRAMUSCULAR; INTRAVENOUS at 20:22

## 2019-12-05 ASSESSMENT — PAIN DESCRIPTION - ORIENTATION: ORIENTATION: LOWER

## 2019-12-05 ASSESSMENT — PAIN SCALES - GENERAL
PAINLEVEL_OUTOF10: 0
PAINLEVEL_OUTOF10: 0
PAINLEVEL_OUTOF10: 7
PAINLEVEL_OUTOF10: 0

## 2019-12-05 ASSESSMENT — PAIN DESCRIPTION - FREQUENCY: FREQUENCY: CONTINUOUS

## 2019-12-05 ASSESSMENT — PAIN DESCRIPTION - DESCRIPTORS: DESCRIPTORS: PRESSURE

## 2019-12-05 ASSESSMENT — PAIN DESCRIPTION - PAIN TYPE: TYPE: ACUTE PAIN

## 2019-12-05 ASSESSMENT — PAIN DESCRIPTION - LOCATION: LOCATION: ABDOMEN

## 2019-12-06 ENCOUNTER — APPOINTMENT (OUTPATIENT)
Dept: CT IMAGING | Age: 74
DRG: 690 | End: 2019-12-06
Payer: COMMERCIAL

## 2019-12-06 LAB
ABSOLUTE EOS #: 0.2 K/UL (ref 0–0.44)
ABSOLUTE IMMATURE GRANULOCYTE: 0.07 K/UL (ref 0–0.3)
ABSOLUTE LYMPH #: 1.21 K/UL (ref 1.1–3.7)
ABSOLUTE MONO #: 0.6 K/UL (ref 0.1–1.2)
ALBUMIN SERPL-MCNC: 3.7 G/DL (ref 3.5–5.2)
ALBUMIN/GLOBULIN RATIO: ABNORMAL (ref 1–2.5)
ALP BLD-CCNC: 46 U/L (ref 35–104)
ALT SERPL-CCNC: <5 U/L (ref 5–33)
ANION GAP SERPL CALCULATED.3IONS-SCNC: 10 MMOL/L (ref 9–17)
AST SERPL-CCNC: 8 U/L
BASOPHILS # BLD: 0 % (ref 0–2)
BASOPHILS ABSOLUTE: 0 K/UL (ref 0–0.2)
BILIRUB SERPL-MCNC: 0.12 MG/DL (ref 0.3–1.2)
BUN BLDV-MCNC: 29 MG/DL (ref 8–23)
BUN/CREAT BLD: 16 (ref 9–20)
CALCIUM SERPL-MCNC: 8.4 MG/DL (ref 8.6–10.4)
CHLORIDE BLD-SCNC: 100 MMOL/L (ref 98–107)
CO2: 32 MMOL/L (ref 20–31)
CREAT SERPL-MCNC: 1.84 MG/DL (ref 0.5–0.9)
DIFFERENTIAL TYPE: ABNORMAL
EOSINOPHILS RELATIVE PERCENT: 3 % (ref 1–4)
ESTIMATED AVERAGE GLUCOSE: 114 MG/DL
GFR AFRICAN AMERICAN: 33 ML/MIN
GFR NON-AFRICAN AMERICAN: 27 ML/MIN
GFR SERPL CREATININE-BSD FRML MDRD: ABNORMAL ML/MIN/{1.73_M2}
GFR SERPL CREATININE-BSD FRML MDRD: ABNORMAL ML/MIN/{1.73_M2}
GLUCOSE BLD-MCNC: 100 MG/DL (ref 70–99)
GLUCOSE BLD-MCNC: 110 MG/DL (ref 65–105)
GLUCOSE BLD-MCNC: 114 MG/DL (ref 65–105)
GLUCOSE BLD-MCNC: 117 MG/DL (ref 65–105)
GLUCOSE BLD-MCNC: 125 MG/DL (ref 65–105)
HBA1C MFR BLD: 5.6 % (ref 4–6)
HCT VFR BLD CALC: 28 % (ref 36.3–47.1)
HEMOGLOBIN: 8.2 G/DL (ref 11.9–15.1)
IMMATURE GRANULOCYTES: 1 %
LACTIC ACID: 0.4 MMOL/L (ref 0.5–2.2)
LACTIC ACID: 0.7 MMOL/L (ref 0.5–2.2)
LYMPHOCYTES # BLD: 18 % (ref 24–43)
MCH RBC QN AUTO: 32.3 PG (ref 25.2–33.5)
MCHC RBC AUTO-ENTMCNC: 29.3 G/DL (ref 28.4–34.8)
MCV RBC AUTO: 110.2 FL (ref 82.6–102.9)
MONOCYTES # BLD: 9 % (ref 3–12)
MORPHOLOGY: ABNORMAL
NRBC AUTOMATED: 0 PER 100 WBC
PDW BLD-RTO: 14.7 % (ref 11.8–14.4)
PLATELET # BLD: 106 K/UL (ref 138–453)
PLATELET ESTIMATE: ABNORMAL
PMV BLD AUTO: 11 FL (ref 8.1–13.5)
POTASSIUM SERPL-SCNC: 4.1 MMOL/L (ref 3.7–5.3)
RBC # BLD: 2.54 M/UL (ref 3.95–5.11)
RBC # BLD: ABNORMAL 10*6/UL
SEG NEUTROPHILS: 69 % (ref 36–65)
SEGMENTED NEUTROPHILS ABSOLUTE COUNT: 4.62 K/UL (ref 1.5–8.1)
SODIUM BLD-SCNC: 142 MMOL/L (ref 135–144)
TOTAL PROTEIN: 5.8 G/DL (ref 6.4–8.3)
WBC # BLD: 6.7 K/UL (ref 3.5–11.3)
WBC # BLD: ABNORMAL 10*3/UL

## 2019-12-06 PROCEDURE — 2700000000 HC OXYGEN THERAPY PER DAY

## 2019-12-06 PROCEDURE — 74176 CT ABD & PELVIS W/O CONTRAST: CPT

## 2019-12-06 PROCEDURE — 36415 COLL VENOUS BLD VENIPUNCTURE: CPT

## 2019-12-06 PROCEDURE — 83605 ASSAY OF LACTIC ACID: CPT

## 2019-12-06 PROCEDURE — 6370000000 HC RX 637 (ALT 250 FOR IP): Performed by: INTERNAL MEDICINE

## 2019-12-06 PROCEDURE — 2580000003 HC RX 258: Performed by: INTERNAL MEDICINE

## 2019-12-06 PROCEDURE — 94640 AIRWAY INHALATION TREATMENT: CPT

## 2019-12-06 PROCEDURE — 97167 OT EVAL HIGH COMPLEX 60 MIN: CPT

## 2019-12-06 PROCEDURE — 6360000002 HC RX W HCPCS: Performed by: INTERNAL MEDICINE

## 2019-12-06 PROCEDURE — 97535 SELF CARE MNGMENT TRAINING: CPT

## 2019-12-06 PROCEDURE — 85025 COMPLETE CBC W/AUTO DIFF WBC: CPT

## 2019-12-06 PROCEDURE — 97163 PT EVAL HIGH COMPLEX 45 MIN: CPT

## 2019-12-06 PROCEDURE — 97530 THERAPEUTIC ACTIVITIES: CPT

## 2019-12-06 PROCEDURE — 94760 N-INVAS EAR/PLS OXIMETRY 1: CPT

## 2019-12-06 PROCEDURE — 82947 ASSAY GLUCOSE BLOOD QUANT: CPT

## 2019-12-06 PROCEDURE — 80053 COMPREHEN METABOLIC PANEL: CPT

## 2019-12-06 PROCEDURE — 97116 GAIT TRAINING THERAPY: CPT

## 2019-12-06 PROCEDURE — 2060000000 HC ICU INTERMEDIATE R&B

## 2019-12-06 RX ORDER — MORPHINE SULFATE 2 MG/ML
1 INJECTION, SOLUTION INTRAMUSCULAR; INTRAVENOUS
Status: DISCONTINUED | OUTPATIENT
Start: 2019-12-06 | End: 2019-12-10 | Stop reason: HOSPADM

## 2019-12-06 RX ORDER — FLUTICASONE FUROATE AND VILANTEROL 100; 25 UG/1; UG/1
2 POWDER RESPIRATORY (INHALATION) DAILY
COMMUNITY

## 2019-12-06 RX ORDER — TRAZODONE HYDROCHLORIDE 50 MG/1
25-50 TABLET ORAL NIGHTLY
COMMUNITY
End: 2020-01-01

## 2019-12-06 RX ORDER — TRAZODONE HYDROCHLORIDE 50 MG/1
50 TABLET ORAL NIGHTLY
Status: DISCONTINUED | OUTPATIENT
Start: 2019-12-06 | End: 2019-12-10 | Stop reason: HOSPADM

## 2019-12-06 RX ADMIN — FUROSEMIDE 20 MG: 20 TABLET ORAL at 08:06

## 2019-12-06 RX ADMIN — PANTOPRAZOLE SODIUM 40 MG: 40 TABLET, DELAYED RELEASE ORAL at 05:31

## 2019-12-06 RX ADMIN — CARBIDOPA AND LEVODOPA 1 TABLET: 25; 100 TABLET, EXTENDED RELEASE ORAL at 12:28

## 2019-12-06 RX ADMIN — ROPINIROLE HYDROCHLORIDE 2 MG: 2 TABLET, FILM COATED ORAL at 20:45

## 2019-12-06 RX ADMIN — SODIUM CHLORIDE, PRESERVATIVE FREE 10 ML: 5 INJECTION INTRAVENOUS at 20:45

## 2019-12-06 RX ADMIN — CARBIDOPA AND LEVODOPA 1 TABLET: 25; 100 TABLET, EXTENDED RELEASE ORAL at 00:26

## 2019-12-06 RX ADMIN — ALBUTEROL SULFATE 2.5 MG: 2.5 SOLUTION RESPIRATORY (INHALATION) at 14:38

## 2019-12-06 RX ADMIN — CARBIDOPA AND LEVODOPA 1 TABLET: 25; 100 TABLET, EXTENDED RELEASE ORAL at 18:14

## 2019-12-06 RX ADMIN — CALCIUM ACETATE 667 MG: 667 CAPSULE ORAL at 08:06

## 2019-12-06 RX ADMIN — MELATONIN TAB 3 MG 3 MG: 3 TAB at 00:24

## 2019-12-06 RX ADMIN — SENNA 17.2 MG: 8.6 TABLET, COATED ORAL at 00:24

## 2019-12-06 RX ADMIN — ROPINIROLE HYDROCHLORIDE 2 MG: 2 TABLET, FILM COATED ORAL at 13:46

## 2019-12-06 RX ADMIN — CARBIDOPA AND LEVODOPA 1 TABLET: 25; 100 TABLET, EXTENDED RELEASE ORAL at 08:06

## 2019-12-06 RX ADMIN — ROPINIROLE HYDROCHLORIDE 2 MG: 2 TABLET, FILM COATED ORAL at 00:24

## 2019-12-06 RX ADMIN — MELATONIN TAB 3 MG 3 MG: 3 TAB at 00:25

## 2019-12-06 RX ADMIN — MOMETASONE FUROATE AND FORMOTEROL FUMARATE DIHYDRATE 2 PUFF: 200; 5 AEROSOL RESPIRATORY (INHALATION) at 20:18

## 2019-12-06 RX ADMIN — CALCIUM ACETATE 667 MG: 667 CAPSULE ORAL at 18:13

## 2019-12-06 RX ADMIN — CALCITRIOL 0.25 MCG: 0.25 CAPSULE ORAL at 08:06

## 2019-12-06 RX ADMIN — ALBUTEROL SULFATE 2.5 MG: 2.5 SOLUTION RESPIRATORY (INHALATION) at 07:15

## 2019-12-06 RX ADMIN — SENNA 17.2 MG: 8.6 TABLET, COATED ORAL at 20:45

## 2019-12-06 RX ADMIN — CARBIDOPA AND LEVODOPA 1 TABLET: 25; 100 TABLET, EXTENDED RELEASE ORAL at 20:45

## 2019-12-06 RX ADMIN — TRAZODONE HYDROCHLORIDE 50 MG: 50 TABLET ORAL at 20:45

## 2019-12-06 RX ADMIN — MORPHINE SULFATE 1 MG: 2 INJECTION, SOLUTION INTRAMUSCULAR; INTRAVENOUS at 20:49

## 2019-12-06 RX ADMIN — MORPHINE SULFATE 1 MG: 2 INJECTION, SOLUTION INTRAMUSCULAR; INTRAVENOUS at 16:02

## 2019-12-06 RX ADMIN — ROPINIROLE HYDROCHLORIDE 2 MG: 2 TABLET, FILM COATED ORAL at 08:06

## 2019-12-06 RX ADMIN — ATORVASTATIN CALCIUM 40 MG: 40 TABLET, FILM COATED ORAL at 00:25

## 2019-12-06 RX ADMIN — RASAGILINE 1 MG: 0.5 TABLET ORAL at 08:06

## 2019-12-06 RX ADMIN — CEFTRIAXONE SODIUM 1 G: 1 INJECTION, POWDER, FOR SOLUTION INTRAMUSCULAR; INTRAVENOUS at 20:44

## 2019-12-06 RX ADMIN — ALBUTEROL SULFATE 2.5 MG: 2.5 SOLUTION RESPIRATORY (INHALATION) at 11:26

## 2019-12-06 RX ADMIN — MOMETASONE FUROATE AND FORMOTEROL FUMARATE DIHYDRATE 2 PUFF: 200; 5 AEROSOL RESPIRATORY (INHALATION) at 09:21

## 2019-12-06 RX ADMIN — METOPROLOL TARTRATE 25 MG: 25 TABLET ORAL at 20:46

## 2019-12-06 RX ADMIN — CALCIUM ACETATE 667 MG: 667 CAPSULE ORAL at 12:29

## 2019-12-06 RX ADMIN — ASPIRIN 81 MG: 81 TABLET, COATED ORAL at 08:06

## 2019-12-06 RX ADMIN — ALBUTEROL SULFATE 2.5 MG: 2.5 SOLUTION RESPIRATORY (INHALATION) at 20:18

## 2019-12-06 RX ADMIN — ATORVASTATIN CALCIUM 40 MG: 40 TABLET, FILM COATED ORAL at 20:45

## 2019-12-06 RX ADMIN — METOPROLOL TARTRATE 25 MG: 25 TABLET ORAL at 00:25

## 2019-12-06 RX ADMIN — ANTI-FUNGAL POWDER MICONAZOLE NITRATE TALC FREE: 1.42 POWDER TOPICAL at 08:16

## 2019-12-06 RX ADMIN — SODIUM CHLORIDE, PRESERVATIVE FREE 10 ML: 5 INJECTION INTRAVENOUS at 00:23

## 2019-12-06 RX ADMIN — ACETAMINOPHEN 500 MG: 500 TABLET ORAL at 00:28

## 2019-12-06 RX ADMIN — AMIODARONE HYDROCHLORIDE 200 MG: 200 TABLET ORAL at 08:06

## 2019-12-06 RX ADMIN — LINAGLIPTIN 2.5 MG: 5 TABLET, FILM COATED ORAL at 08:09

## 2019-12-06 RX ADMIN — SODIUM CHLORIDE, PRESERVATIVE FREE 10 ML: 5 INJECTION INTRAVENOUS at 08:10

## 2019-12-06 RX ADMIN — ANTI-FUNGAL POWDER MICONAZOLE NITRATE TALC FREE: 1.42 POWDER TOPICAL at 20:43

## 2019-12-06 ASSESSMENT — PAIN DESCRIPTION - PAIN TYPE: TYPE: CHRONIC PAIN

## 2019-12-06 ASSESSMENT — PAIN SCALES - GENERAL
PAINLEVEL_OUTOF10: 0
PAINLEVEL_OUTOF10: 0
PAINLEVEL_OUTOF10: 8
PAINLEVEL_OUTOF10: 0
PAINLEVEL_OUTOF10: 7
PAINLEVEL_OUTOF10: 0
PAINLEVEL_OUTOF10: 9
PAINLEVEL_OUTOF10: 0
PAINLEVEL_OUTOF10: 0

## 2019-12-06 ASSESSMENT — PAIN DESCRIPTION - LOCATION: LOCATION: GENERALIZED

## 2019-12-07 LAB
ABSOLUTE EOS #: 0.1 K/UL (ref 0–0.44)
ABSOLUTE IMMATURE GRANULOCYTE: 0.04 K/UL (ref 0–0.3)
ABSOLUTE LYMPH #: 1.1 K/UL (ref 1.1–3.7)
ABSOLUTE MONO #: 0.68 K/UL (ref 0.1–1.2)
ANION GAP SERPL CALCULATED.3IONS-SCNC: 7 MMOL/L (ref 9–17)
BASOPHILS # BLD: 0 % (ref 0–2)
BASOPHILS ABSOLUTE: <0.03 K/UL (ref 0–0.2)
BUN BLDV-MCNC: 31 MG/DL (ref 8–23)
BUN/CREAT BLD: 16 (ref 9–20)
CALCIUM SERPL-MCNC: 8.7 MG/DL (ref 8.6–10.4)
CHLORIDE BLD-SCNC: 96 MMOL/L (ref 98–107)
CO2: 38 MMOL/L (ref 20–31)
CREAT SERPL-MCNC: 1.97 MG/DL (ref 0.5–0.9)
CULTURE: ABNORMAL
DIFFERENTIAL TYPE: ABNORMAL
EOSINOPHILS RELATIVE PERCENT: 1 % (ref 1–4)
GFR AFRICAN AMERICAN: 30 ML/MIN
GFR NON-AFRICAN AMERICAN: 25 ML/MIN
GFR SERPL CREATININE-BSD FRML MDRD: ABNORMAL ML/MIN/{1.73_M2}
GFR SERPL CREATININE-BSD FRML MDRD: ABNORMAL ML/MIN/{1.73_M2}
GLUCOSE BLD-MCNC: 102 MG/DL (ref 70–99)
GLUCOSE BLD-MCNC: 119 MG/DL (ref 65–105)
GLUCOSE BLD-MCNC: 264 MG/DL (ref 65–105)
GLUCOSE BLD-MCNC: 88 MG/DL (ref 65–105)
GLUCOSE BLD-MCNC: 91 MG/DL (ref 65–105)
HCT VFR BLD CALC: 27.1 % (ref 36.3–47.1)
HEMOGLOBIN: 7.9 G/DL (ref 11.9–15.1)
IMMATURE GRANULOCYTES: 1 %
LYMPHOCYTES # BLD: 14 % (ref 24–43)
Lab: ABNORMAL
MCH RBC QN AUTO: 32 PG (ref 25.2–33.5)
MCHC RBC AUTO-ENTMCNC: 29.2 G/DL (ref 28.4–34.8)
MCV RBC AUTO: 109.7 FL (ref 82.6–102.9)
MONOCYTES # BLD: 9 % (ref 3–12)
NRBC AUTOMATED: 0 PER 100 WBC
PDW BLD-RTO: 14.6 % (ref 11.8–14.4)
PLATELET # BLD: 78 K/UL (ref 138–453)
PLATELET ESTIMATE: ABNORMAL
PMV BLD AUTO: 11.2 FL (ref 8.1–13.5)
POTASSIUM SERPL-SCNC: 4.2 MMOL/L (ref 3.7–5.3)
RBC # BLD: 2.47 M/UL (ref 3.95–5.11)
RBC # BLD: ABNORMAL 10*6/UL
SEG NEUTROPHILS: 75 % (ref 36–65)
SEGMENTED NEUTROPHILS ABSOLUTE COUNT: 5.71 K/UL (ref 1.5–8.1)
SODIUM BLD-SCNC: 141 MMOL/L (ref 135–144)
SPECIMEN DESCRIPTION: ABNORMAL
WBC # BLD: 7.7 K/UL (ref 3.5–11.3)
WBC # BLD: ABNORMAL 10*3/UL

## 2019-12-07 PROCEDURE — 82947 ASSAY GLUCOSE BLOOD QUANT: CPT

## 2019-12-07 PROCEDURE — 6370000000 HC RX 637 (ALT 250 FOR IP): Performed by: INTERNAL MEDICINE

## 2019-12-07 PROCEDURE — 6360000002 HC RX W HCPCS: Performed by: INTERNAL MEDICINE

## 2019-12-07 PROCEDURE — 97530 THERAPEUTIC ACTIVITIES: CPT

## 2019-12-07 PROCEDURE — 2580000003 HC RX 258: Performed by: INTERNAL MEDICINE

## 2019-12-07 PROCEDURE — 94760 N-INVAS EAR/PLS OXIMETRY 1: CPT

## 2019-12-07 PROCEDURE — 80048 BASIC METABOLIC PNL TOTAL CA: CPT

## 2019-12-07 PROCEDURE — 85025 COMPLETE CBC W/AUTO DIFF WBC: CPT

## 2019-12-07 PROCEDURE — 94640 AIRWAY INHALATION TREATMENT: CPT

## 2019-12-07 PROCEDURE — 2060000000 HC ICU INTERMEDIATE R&B

## 2019-12-07 PROCEDURE — 2700000000 HC OXYGEN THERAPY PER DAY

## 2019-12-07 PROCEDURE — 36415 COLL VENOUS BLD VENIPUNCTURE: CPT

## 2019-12-07 RX ADMIN — ACETAMINOPHEN 500 MG: 500 TABLET ORAL at 03:26

## 2019-12-07 RX ADMIN — APIXABAN 2.5 MG: 2.5 TABLET, FILM COATED ORAL at 23:34

## 2019-12-07 RX ADMIN — TRAZODONE HYDROCHLORIDE 50 MG: 50 TABLET ORAL at 21:14

## 2019-12-07 RX ADMIN — SODIUM CHLORIDE, PRESERVATIVE FREE 10 ML: 5 INJECTION INTRAVENOUS at 21:15

## 2019-12-07 RX ADMIN — LINAGLIPTIN 2.5 MG: 5 TABLET, FILM COATED ORAL at 09:30

## 2019-12-07 RX ADMIN — METOPROLOL TARTRATE 25 MG: 25 TABLET ORAL at 21:13

## 2019-12-07 RX ADMIN — CARBIDOPA AND LEVODOPA 1 TABLET: 25; 100 TABLET, EXTENDED RELEASE ORAL at 14:33

## 2019-12-07 RX ADMIN — ATORVASTATIN CALCIUM 40 MG: 40 TABLET, FILM COATED ORAL at 21:13

## 2019-12-07 RX ADMIN — CALCIUM ACETATE 667 MG: 667 CAPSULE ORAL at 09:38

## 2019-12-07 RX ADMIN — MOMETASONE FUROATE AND FORMOTEROL FUMARATE DIHYDRATE 2 PUFF: 200; 5 AEROSOL RESPIRATORY (INHALATION) at 07:32

## 2019-12-07 RX ADMIN — METOPROLOL TARTRATE 25 MG: 25 TABLET ORAL at 09:38

## 2019-12-07 RX ADMIN — APIXABAN 2.5 MG: 2.5 TABLET, FILM COATED ORAL at 14:17

## 2019-12-07 RX ADMIN — MORPHINE SULFATE 1 MG: 2 INJECTION, SOLUTION INTRAMUSCULAR; INTRAVENOUS at 22:50

## 2019-12-07 RX ADMIN — ANTI-FUNGAL POWDER MICONAZOLE NITRATE TALC FREE: 1.42 POWDER TOPICAL at 09:31

## 2019-12-07 RX ADMIN — ALBUTEROL SULFATE 2.5 MG: 2.5 SOLUTION RESPIRATORY (INHALATION) at 11:31

## 2019-12-07 RX ADMIN — MEROPENEM 1 G: 1 INJECTION, POWDER, FOR SOLUTION INTRAVENOUS at 17:25

## 2019-12-07 RX ADMIN — CALCITRIOL 0.25 MCG: 0.25 CAPSULE ORAL at 09:29

## 2019-12-07 RX ADMIN — INSULIN LISPRO 1 UNITS: 100 INJECTION, SOLUTION INTRAVENOUS; SUBCUTANEOUS at 21:11

## 2019-12-07 RX ADMIN — ALBUTEROL SULFATE 2.5 MG: 2.5 SOLUTION RESPIRATORY (INHALATION) at 07:31

## 2019-12-07 RX ADMIN — ROPINIROLE HYDROCHLORIDE 2 MG: 2 TABLET, FILM COATED ORAL at 09:29

## 2019-12-07 RX ADMIN — AMIODARONE HYDROCHLORIDE 200 MG: 200 TABLET ORAL at 09:30

## 2019-12-07 RX ADMIN — CARBIDOPA AND LEVODOPA 1 TABLET: 25; 100 TABLET, EXTENDED RELEASE ORAL at 09:29

## 2019-12-07 RX ADMIN — MORPHINE SULFATE 1 MG: 2 INJECTION, SOLUTION INTRAMUSCULAR; INTRAVENOUS at 08:01

## 2019-12-07 RX ADMIN — PANTOPRAZOLE SODIUM 40 MG: 40 TABLET, DELAYED RELEASE ORAL at 06:08

## 2019-12-07 RX ADMIN — RASAGILINE 1 MG: 0.5 TABLET ORAL at 09:29

## 2019-12-07 RX ADMIN — ROPINIROLE HYDROCHLORIDE 2 MG: 2 TABLET, FILM COATED ORAL at 21:14

## 2019-12-07 RX ADMIN — CALCIUM ACETATE 667 MG: 667 CAPSULE ORAL at 12:46

## 2019-12-07 RX ADMIN — ANTI-FUNGAL POWDER MICONAZOLE NITRATE TALC FREE: 1.42 POWDER TOPICAL at 21:11

## 2019-12-07 RX ADMIN — ROPINIROLE HYDROCHLORIDE 2 MG: 2 TABLET, FILM COATED ORAL at 14:17

## 2019-12-07 RX ADMIN — ASPIRIN 81 MG: 81 TABLET, COATED ORAL at 09:38

## 2019-12-07 RX ADMIN — ACETAMINOPHEN 500 MG: 500 TABLET ORAL at 17:36

## 2019-12-07 RX ADMIN — CALCIUM ACETATE 667 MG: 667 CAPSULE ORAL at 17:25

## 2019-12-07 RX ADMIN — FUROSEMIDE 20 MG: 20 TABLET ORAL at 09:29

## 2019-12-07 RX ADMIN — INSULIN LISPRO 3 UNITS: 100 INJECTION, SOLUTION INTRAVENOUS; SUBCUTANEOUS at 12:45

## 2019-12-07 RX ADMIN — MOMETASONE FUROATE AND FORMOTEROL FUMARATE DIHYDRATE 2 PUFF: 200; 5 AEROSOL RESPIRATORY (INHALATION) at 19:40

## 2019-12-07 RX ADMIN — CARBIDOPA AND LEVODOPA 1 TABLET: 25; 100 TABLET, EXTENDED RELEASE ORAL at 17:25

## 2019-12-07 RX ADMIN — SODIUM CHLORIDE, PRESERVATIVE FREE 10 ML: 5 INJECTION INTRAVENOUS at 08:02

## 2019-12-07 RX ADMIN — CARBIDOPA AND LEVODOPA 1 TABLET: 25; 100 TABLET, EXTENDED RELEASE ORAL at 21:14

## 2019-12-07 RX ADMIN — ALBUTEROL SULFATE 2.5 MG: 2.5 SOLUTION RESPIRATORY (INHALATION) at 19:40

## 2019-12-07 ASSESSMENT — PAIN SCALES - GENERAL
PAINLEVEL_OUTOF10: 8
PAINLEVEL_OUTOF10: 2
PAINLEVEL_OUTOF10: 0
PAINLEVEL_OUTOF10: 8
PAINLEVEL_OUTOF10: 5
PAINLEVEL_OUTOF10: 9
PAINLEVEL_OUTOF10: 0
PAINLEVEL_OUTOF10: 9
PAINLEVEL_OUTOF10: 0

## 2019-12-07 ASSESSMENT — PAIN DESCRIPTION - PAIN TYPE: TYPE: CHRONIC PAIN

## 2019-12-07 ASSESSMENT — PAIN DESCRIPTION - LOCATION: LOCATION: GENERALIZED

## 2019-12-08 LAB
ABSOLUTE EOS #: 0.19 K/UL (ref 0–0.44)
ABSOLUTE IMMATURE GRANULOCYTE: 0.05 K/UL (ref 0–0.3)
ABSOLUTE LYMPH #: 0.86 K/UL (ref 1.1–3.7)
ABSOLUTE MONO #: 0.74 K/UL (ref 0.1–1.2)
ANION GAP SERPL CALCULATED.3IONS-SCNC: 8 MMOL/L (ref 9–17)
BASOPHILS # BLD: 0 % (ref 0–2)
BASOPHILS ABSOLUTE: 0.03 K/UL (ref 0–0.2)
BUN BLDV-MCNC: 27 MG/DL (ref 8–23)
BUN/CREAT BLD: 16 (ref 9–20)
CALCIUM SERPL-MCNC: 8.7 MG/DL (ref 8.6–10.4)
CHLORIDE BLD-SCNC: 95 MMOL/L (ref 98–107)
CO2: 39 MMOL/L (ref 20–31)
CREAT SERPL-MCNC: 1.73 MG/DL (ref 0.5–0.9)
DIFFERENTIAL TYPE: ABNORMAL
EOSINOPHILS RELATIVE PERCENT: 2 % (ref 1–4)
FOLATE: 14.2 NG/ML
GFR AFRICAN AMERICAN: 35 ML/MIN
GFR NON-AFRICAN AMERICAN: 29 ML/MIN
GFR SERPL CREATININE-BSD FRML MDRD: ABNORMAL ML/MIN/{1.73_M2}
GFR SERPL CREATININE-BSD FRML MDRD: ABNORMAL ML/MIN/{1.73_M2}
GLUCOSE BLD-MCNC: 109 MG/DL (ref 70–99)
GLUCOSE BLD-MCNC: 112 MG/DL (ref 65–105)
GLUCOSE BLD-MCNC: 144 MG/DL (ref 65–105)
GLUCOSE BLD-MCNC: 152 MG/DL (ref 65–105)
GLUCOSE BLD-MCNC: 159 MG/DL (ref 65–105)
HCT VFR BLD CALC: 28.6 % (ref 36.3–47.1)
HEMOGLOBIN: 8.5 G/DL (ref 11.9–15.1)
IMMATURE GRANULOCYTES: 1 %
LYMPHOCYTES # BLD: 11 % (ref 24–43)
MCH RBC QN AUTO: 32.2 PG (ref 25.2–33.5)
MCHC RBC AUTO-ENTMCNC: 29.7 G/DL (ref 28.4–34.8)
MCV RBC AUTO: 108.3 FL (ref 82.6–102.9)
MONOCYTES # BLD: 9 % (ref 3–12)
NRBC AUTOMATED: 0 PER 100 WBC
PDW BLD-RTO: 14.6 % (ref 11.8–14.4)
PLATELET # BLD: 92 K/UL (ref 138–453)
PLATELET ESTIMATE: ABNORMAL
PMV BLD AUTO: 10.9 FL (ref 8.1–13.5)
POTASSIUM SERPL-SCNC: 4.2 MMOL/L (ref 3.7–5.3)
RBC # BLD: 2.64 M/UL (ref 3.95–5.11)
RBC # BLD: ABNORMAL 10*6/UL
SEG NEUTROPHILS: 77 % (ref 36–65)
SEGMENTED NEUTROPHILS ABSOLUTE COUNT: 6.35 K/UL (ref 1.5–8.1)
SODIUM BLD-SCNC: 142 MMOL/L (ref 135–144)
VITAMIN B-12: >2000 PG/ML (ref 232–1245)
WBC # BLD: 8.2 K/UL (ref 3.5–11.3)
WBC # BLD: ABNORMAL 10*3/UL

## 2019-12-08 PROCEDURE — 80048 BASIC METABOLIC PNL TOTAL CA: CPT

## 2019-12-08 PROCEDURE — 6360000002 HC RX W HCPCS: Performed by: INTERNAL MEDICINE

## 2019-12-08 PROCEDURE — 36415 COLL VENOUS BLD VENIPUNCTURE: CPT

## 2019-12-08 PROCEDURE — 2700000000 HC OXYGEN THERAPY PER DAY

## 2019-12-08 PROCEDURE — 82746 ASSAY OF FOLIC ACID SERUM: CPT

## 2019-12-08 PROCEDURE — 2580000003 HC RX 258: Performed by: INTERNAL MEDICINE

## 2019-12-08 PROCEDURE — 94640 AIRWAY INHALATION TREATMENT: CPT

## 2019-12-08 PROCEDURE — 6370000000 HC RX 637 (ALT 250 FOR IP): Performed by: INTERNAL MEDICINE

## 2019-12-08 PROCEDURE — 85025 COMPLETE CBC W/AUTO DIFF WBC: CPT

## 2019-12-08 PROCEDURE — 99222 1ST HOSP IP/OBS MODERATE 55: CPT | Performed by: INTERNAL MEDICINE

## 2019-12-08 PROCEDURE — 2060000000 HC ICU INTERMEDIATE R&B

## 2019-12-08 PROCEDURE — 82607 VITAMIN B-12: CPT

## 2019-12-08 RX ADMIN — ROPINIROLE HYDROCHLORIDE 2 MG: 2 TABLET, FILM COATED ORAL at 09:19

## 2019-12-08 RX ADMIN — METOPROLOL TARTRATE 25 MG: 25 TABLET ORAL at 21:10

## 2019-12-08 RX ADMIN — CALCIUM ACETATE 667 MG: 667 CAPSULE ORAL at 17:25

## 2019-12-08 RX ADMIN — ANTI-FUNGAL POWDER MICONAZOLE NITRATE TALC FREE: 1.42 POWDER TOPICAL at 21:09

## 2019-12-08 RX ADMIN — MOMETASONE FUROATE AND FORMOTEROL FUMARATE DIHYDRATE 2 PUFF: 200; 5 AEROSOL RESPIRATORY (INHALATION) at 20:53

## 2019-12-08 RX ADMIN — LINAGLIPTIN 2.5 MG: 5 TABLET, FILM COATED ORAL at 09:20

## 2019-12-08 RX ADMIN — ALBUTEROL SULFATE 2.5 MG: 2.5 SOLUTION RESPIRATORY (INHALATION) at 11:59

## 2019-12-08 RX ADMIN — CARBIDOPA AND LEVODOPA 1 TABLET: 25; 100 TABLET, EXTENDED RELEASE ORAL at 17:25

## 2019-12-08 RX ADMIN — ASPIRIN 81 MG: 81 TABLET, COATED ORAL at 09:19

## 2019-12-08 RX ADMIN — CALCITRIOL 0.25 MCG: 0.25 CAPSULE ORAL at 09:19

## 2019-12-08 RX ADMIN — ACETAMINOPHEN 500 MG: 500 TABLET ORAL at 15:36

## 2019-12-08 RX ADMIN — ALBUTEROL SULFATE 2.5 MG: 2.5 SOLUTION RESPIRATORY (INHALATION) at 08:09

## 2019-12-08 RX ADMIN — INSULIN LISPRO 1 UNITS: 100 INJECTION, SOLUTION INTRAVENOUS; SUBCUTANEOUS at 17:24

## 2019-12-08 RX ADMIN — MEROPENEM 1 G: 1 INJECTION, POWDER, FOR SOLUTION INTRAVENOUS at 17:24

## 2019-12-08 RX ADMIN — MORPHINE SULFATE 1 MG: 2 INJECTION, SOLUTION INTRAMUSCULAR; INTRAVENOUS at 17:25

## 2019-12-08 RX ADMIN — ROPINIROLE HYDROCHLORIDE 2 MG: 2 TABLET, FILM COATED ORAL at 15:34

## 2019-12-08 RX ADMIN — CARBIDOPA AND LEVODOPA 1 TABLET: 25; 100 TABLET, EXTENDED RELEASE ORAL at 09:19

## 2019-12-08 RX ADMIN — ROPINIROLE HYDROCHLORIDE 2 MG: 2 TABLET, FILM COATED ORAL at 21:07

## 2019-12-08 RX ADMIN — CALCIUM ACETATE 667 MG: 667 CAPSULE ORAL at 09:20

## 2019-12-08 RX ADMIN — ANTI-FUNGAL POWDER MICONAZOLE NITRATE TALC FREE: 1.42 POWDER TOPICAL at 09:22

## 2019-12-08 RX ADMIN — ALBUTEROL SULFATE 2.5 MG: 2.5 SOLUTION RESPIRATORY (INHALATION) at 20:53

## 2019-12-08 RX ADMIN — APIXABAN 2.5 MG: 2.5 TABLET, FILM COATED ORAL at 09:19

## 2019-12-08 RX ADMIN — MEROPENEM 1 G: 1 INJECTION, POWDER, FOR SOLUTION INTRAVENOUS at 04:36

## 2019-12-08 RX ADMIN — AMIODARONE HYDROCHLORIDE 200 MG: 200 TABLET ORAL at 09:19

## 2019-12-08 RX ADMIN — METOPROLOL TARTRATE 25 MG: 25 TABLET ORAL at 09:19

## 2019-12-08 RX ADMIN — MOMETASONE FUROATE AND FORMOTEROL FUMARATE DIHYDRATE 2 PUFF: 200; 5 AEROSOL RESPIRATORY (INHALATION) at 08:09

## 2019-12-08 RX ADMIN — ACETAMINOPHEN 500 MG: 500 TABLET ORAL at 07:37

## 2019-12-08 RX ADMIN — ALBUTEROL SULFATE 2.5 MG: 2.5 SOLUTION RESPIRATORY (INHALATION) at 15:48

## 2019-12-08 RX ADMIN — RASAGILINE 1 MG: 0.5 TABLET ORAL at 09:19

## 2019-12-08 RX ADMIN — FUROSEMIDE 20 MG: 20 TABLET ORAL at 09:20

## 2019-12-08 RX ADMIN — PANTOPRAZOLE SODIUM 40 MG: 40 TABLET, DELAYED RELEASE ORAL at 05:13

## 2019-12-08 RX ADMIN — APIXABAN 2.5 MG: 2.5 TABLET, FILM COATED ORAL at 21:08

## 2019-12-08 RX ADMIN — ATORVASTATIN CALCIUM 40 MG: 40 TABLET, FILM COATED ORAL at 21:07

## 2019-12-08 RX ADMIN — MORPHINE SULFATE 1 MG: 2 INJECTION, SOLUTION INTRAMUSCULAR; INTRAVENOUS at 09:18

## 2019-12-08 RX ADMIN — CALCIUM ACETATE 667 MG: 667 CAPSULE ORAL at 12:40

## 2019-12-08 RX ADMIN — CARBIDOPA AND LEVODOPA 1 TABLET: 25; 100 TABLET, EXTENDED RELEASE ORAL at 12:40

## 2019-12-08 RX ADMIN — CARBIDOPA AND LEVODOPA 1 TABLET: 25; 100 TABLET, EXTENDED RELEASE ORAL at 21:07

## 2019-12-08 RX ADMIN — TRAZODONE HYDROCHLORIDE 50 MG: 50 TABLET ORAL at 21:07

## 2019-12-08 RX ADMIN — INSULIN LISPRO 1 UNITS: 100 INJECTION, SOLUTION INTRAVENOUS; SUBCUTANEOUS at 21:11

## 2019-12-08 RX ADMIN — MORPHINE SULFATE 1 MG: 2 INJECTION, SOLUTION INTRAMUSCULAR; INTRAVENOUS at 22:46

## 2019-12-08 RX ADMIN — SODIUM CHLORIDE, PRESERVATIVE FREE 10 ML: 5 INJECTION INTRAVENOUS at 09:19

## 2019-12-08 ASSESSMENT — PAIN SCALES - GENERAL
PAINLEVEL_OUTOF10: 0
PAINLEVEL_OUTOF10: 9
PAINLEVEL_OUTOF10: 0
PAINLEVEL_OUTOF10: 8
PAINLEVEL_OUTOF10: 0
PAINLEVEL_OUTOF10: 9
PAINLEVEL_OUTOF10: 0

## 2019-12-09 ENCOUNTER — APPOINTMENT (OUTPATIENT)
Dept: INTERVENTIONAL RADIOLOGY/VASCULAR | Age: 74
DRG: 690 | End: 2019-12-09
Payer: COMMERCIAL

## 2019-12-09 LAB
ANION GAP SERPL CALCULATED.3IONS-SCNC: 11 MMOL/L (ref 9–17)
BUN BLDV-MCNC: 30 MG/DL (ref 8–23)
BUN/CREAT BLD: 15 (ref 9–20)
CALCIUM SERPL-MCNC: 8.6 MG/DL (ref 8.6–10.4)
CHLORIDE BLD-SCNC: 96 MMOL/L (ref 98–107)
CO2: 32 MMOL/L (ref 20–31)
CREAT SERPL-MCNC: 1.98 MG/DL (ref 0.5–0.9)
GFR AFRICAN AMERICAN: 30 ML/MIN
GFR NON-AFRICAN AMERICAN: 25 ML/MIN
GFR SERPL CREATININE-BSD FRML MDRD: ABNORMAL ML/MIN/{1.73_M2}
GFR SERPL CREATININE-BSD FRML MDRD: ABNORMAL ML/MIN/{1.73_M2}
GLUCOSE BLD-MCNC: 108 MG/DL (ref 70–99)
GLUCOSE BLD-MCNC: 131 MG/DL (ref 65–105)
GLUCOSE BLD-MCNC: 132 MG/DL (ref 65–105)
GLUCOSE BLD-MCNC: 89 MG/DL (ref 65–105)
GLUCOSE BLD-MCNC: 91 MG/DL (ref 65–105)
POTASSIUM SERPL-SCNC: 4.2 MMOL/L (ref 3.7–5.3)
SODIUM BLD-SCNC: 139 MMOL/L (ref 135–144)

## 2019-12-09 PROCEDURE — 36410 VNPNXR 3YR/> PHY/QHP DX/THER: CPT

## 2019-12-09 PROCEDURE — 6360000002 HC RX W HCPCS: Performed by: INTERNAL MEDICINE

## 2019-12-09 PROCEDURE — 97530 THERAPEUTIC ACTIVITIES: CPT

## 2019-12-09 PROCEDURE — 36415 COLL VENOUS BLD VENIPUNCTURE: CPT

## 2019-12-09 PROCEDURE — 99232 SBSQ HOSP IP/OBS MODERATE 35: CPT | Performed by: INTERNAL MEDICINE

## 2019-12-09 PROCEDURE — 82947 ASSAY GLUCOSE BLOOD QUANT: CPT

## 2019-12-09 PROCEDURE — 2060000000 HC ICU INTERMEDIATE R&B

## 2019-12-09 PROCEDURE — 6370000000 HC RX 637 (ALT 250 FOR IP): Performed by: INTERNAL MEDICINE

## 2019-12-09 PROCEDURE — 94760 N-INVAS EAR/PLS OXIMETRY 1: CPT

## 2019-12-09 PROCEDURE — 76937 US GUIDE VASCULAR ACCESS: CPT

## 2019-12-09 PROCEDURE — 2580000003 HC RX 258: Performed by: INTERNAL MEDICINE

## 2019-12-09 PROCEDURE — 80048 BASIC METABOLIC PNL TOTAL CA: CPT

## 2019-12-09 PROCEDURE — C1751 CATH, INF, PER/CENT/MIDLINE: HCPCS

## 2019-12-09 PROCEDURE — 97535 SELF CARE MNGMENT TRAINING: CPT

## 2019-12-09 PROCEDURE — 94640 AIRWAY INHALATION TREATMENT: CPT

## 2019-12-09 PROCEDURE — 2700000000 HC OXYGEN THERAPY PER DAY

## 2019-12-09 PROCEDURE — 97110 THERAPEUTIC EXERCISES: CPT

## 2019-12-09 PROCEDURE — 97116 GAIT TRAINING THERAPY: CPT

## 2019-12-09 PROCEDURE — 02HV33Z INSERTION OF INFUSION DEVICE INTO SUPERIOR VENA CAVA, PERCUTANEOUS APPROACH: ICD-10-PCS | Performed by: INTERNAL MEDICINE

## 2019-12-09 RX ORDER — CIPROFLOXACIN HYDROCHLORIDE 3.5 MG/ML
1 SOLUTION/ DROPS TOPICAL
Status: DISCONTINUED | OUTPATIENT
Start: 2019-12-09 | End: 2019-12-10 | Stop reason: HOSPADM

## 2019-12-09 RX ADMIN — MORPHINE SULFATE 1 MG: 2 INJECTION, SOLUTION INTRAMUSCULAR; INTRAVENOUS at 09:03

## 2019-12-09 RX ADMIN — CALCIUM ACETATE 667 MG: 667 CAPSULE ORAL at 09:01

## 2019-12-09 RX ADMIN — MEROPENEM 1 G: 1 INJECTION, POWDER, FOR SOLUTION INTRAVENOUS at 04:20

## 2019-12-09 RX ADMIN — AMIODARONE HYDROCHLORIDE 200 MG: 200 TABLET ORAL at 09:03

## 2019-12-09 RX ADMIN — CARBIDOPA AND LEVODOPA 1 TABLET: 25; 100 TABLET, EXTENDED RELEASE ORAL at 09:03

## 2019-12-09 RX ADMIN — SODIUM CHLORIDE, PRESERVATIVE FREE 10 ML: 5 INJECTION INTRAVENOUS at 09:05

## 2019-12-09 RX ADMIN — ROPINIROLE HYDROCHLORIDE 2 MG: 2 TABLET, FILM COATED ORAL at 20:32

## 2019-12-09 RX ADMIN — CALCIUM ACETATE 667 MG: 667 CAPSULE ORAL at 12:15

## 2019-12-09 RX ADMIN — MOMETASONE FUROATE AND FORMOTEROL FUMARATE DIHYDRATE 2 PUFF: 200; 5 AEROSOL RESPIRATORY (INHALATION) at 08:03

## 2019-12-09 RX ADMIN — ALBUTEROL SULFATE 2.5 MG: 2.5 SOLUTION RESPIRATORY (INHALATION) at 15:12

## 2019-12-09 RX ADMIN — FUROSEMIDE 20 MG: 20 TABLET ORAL at 09:03

## 2019-12-09 RX ADMIN — APIXABAN 2.5 MG: 2.5 TABLET, FILM COATED ORAL at 09:02

## 2019-12-09 RX ADMIN — APIXABAN 2.5 MG: 2.5 TABLET, FILM COATED ORAL at 20:32

## 2019-12-09 RX ADMIN — DARBEPOETIN ALFA 25 MCG: 25 INJECTION, SOLUTION INTRAVENOUS; SUBCUTANEOUS at 11:05

## 2019-12-09 RX ADMIN — ALBUTEROL SULFATE 2.5 MG: 2.5 SOLUTION RESPIRATORY (INHALATION) at 19:46

## 2019-12-09 RX ADMIN — CARBIDOPA AND LEVODOPA 1 TABLET: 25; 100 TABLET, EXTENDED RELEASE ORAL at 20:32

## 2019-12-09 RX ADMIN — CIPROFLOXACIN 1 DROP: 3 SOLUTION OPHTHALMIC at 20:31

## 2019-12-09 RX ADMIN — ROPINIROLE HYDROCHLORIDE 2 MG: 2 TABLET, FILM COATED ORAL at 15:02

## 2019-12-09 RX ADMIN — CALCIUM ACETATE 667 MG: 667 CAPSULE ORAL at 17:33

## 2019-12-09 RX ADMIN — LINAGLIPTIN 2.5 MG: 5 TABLET, FILM COATED ORAL at 09:02

## 2019-12-09 RX ADMIN — RASAGILINE 1 MG: 0.5 TABLET ORAL at 09:01

## 2019-12-09 RX ADMIN — MORPHINE SULFATE 1 MG: 2 INJECTION, SOLUTION INTRAMUSCULAR; INTRAVENOUS at 05:47

## 2019-12-09 RX ADMIN — ACETAMINOPHEN 500 MG: 500 TABLET ORAL at 18:09

## 2019-12-09 RX ADMIN — METOPROLOL TARTRATE 25 MG: 25 TABLET ORAL at 09:03

## 2019-12-09 RX ADMIN — ROPINIROLE HYDROCHLORIDE 2 MG: 2 TABLET, FILM COATED ORAL at 09:01

## 2019-12-09 RX ADMIN — ASPIRIN 81 MG: 81 TABLET, COATED ORAL at 09:02

## 2019-12-09 RX ADMIN — ALBUTEROL SULFATE 2.5 MG: 2.5 SOLUTION RESPIRATORY (INHALATION) at 11:03

## 2019-12-09 RX ADMIN — MEROPENEM 1 G: 1 INJECTION, POWDER, FOR SOLUTION INTRAVENOUS at 16:00

## 2019-12-09 RX ADMIN — ALBUTEROL SULFATE 2.5 MG: 2.5 SOLUTION RESPIRATORY (INHALATION) at 08:03

## 2019-12-09 RX ADMIN — CARBIDOPA AND LEVODOPA 1 TABLET: 25; 100 TABLET, EXTENDED RELEASE ORAL at 12:15

## 2019-12-09 RX ADMIN — ANTI-FUNGAL POWDER MICONAZOLE NITRATE TALC FREE: 1.42 POWDER TOPICAL at 09:04

## 2019-12-09 RX ADMIN — ACETAMINOPHEN 500 MG: 500 TABLET ORAL at 01:48

## 2019-12-09 RX ADMIN — CALCITRIOL 0.25 MCG: 0.25 CAPSULE ORAL at 09:02

## 2019-12-09 RX ADMIN — CARBIDOPA AND LEVODOPA 1 TABLET: 25; 100 TABLET, EXTENDED RELEASE ORAL at 17:34

## 2019-12-09 RX ADMIN — PANTOPRAZOLE SODIUM 40 MG: 40 TABLET, DELAYED RELEASE ORAL at 05:28

## 2019-12-09 RX ADMIN — TRAZODONE HYDROCHLORIDE 50 MG: 50 TABLET ORAL at 20:32

## 2019-12-09 RX ADMIN — SODIUM CHLORIDE, PRESERVATIVE FREE 10 ML: 5 INJECTION INTRAVENOUS at 20:32

## 2019-12-09 RX ADMIN — MOMETASONE FUROATE AND FORMOTEROL FUMARATE DIHYDRATE 2 PUFF: 200; 5 AEROSOL RESPIRATORY (INHALATION) at 19:47

## 2019-12-09 RX ADMIN — ATORVASTATIN CALCIUM 40 MG: 40 TABLET, FILM COATED ORAL at 20:32

## 2019-12-09 RX ADMIN — ANTI-FUNGAL POWDER MICONAZOLE NITRATE TALC FREE: 1.42 POWDER TOPICAL at 22:40

## 2019-12-09 RX ADMIN — METOPROLOL TARTRATE 25 MG: 25 TABLET ORAL at 20:32

## 2019-12-09 ASSESSMENT — PAIN SCALES - GENERAL
PAINLEVEL_OUTOF10: 0
PAINLEVEL_OUTOF10: 10
PAINLEVEL_OUTOF10: 0
PAINLEVEL_OUTOF10: 8
PAINLEVEL_OUTOF10: 5
PAINLEVEL_OUTOF10: 0
PAINLEVEL_OUTOF10: 7
PAINLEVEL_OUTOF10: 0
PAINLEVEL_OUTOF10: 8
PAINLEVEL_OUTOF10: 0

## 2019-12-10 ENCOUNTER — HOSPITAL ENCOUNTER (OUTPATIENT)
Dept: INFUSION THERAPY | Facility: MEDICAL CENTER | Age: 74
Discharge: HOME | End: 2019-12-10
Payer: COMMERCIAL

## 2019-12-10 VITALS
SYSTOLIC BLOOD PRESSURE: 135 MMHG | RESPIRATION RATE: 18 BRPM | HEIGHT: 60 IN | OXYGEN SATURATION: 95 % | WEIGHT: 189 LBS | DIASTOLIC BLOOD PRESSURE: 49 MMHG | HEART RATE: 72 BPM | TEMPERATURE: 97.2 F | BODY MASS INDEX: 37.11 KG/M2

## 2019-12-10 LAB
ABSOLUTE EOS #: 0.06 K/UL (ref 0–0.4)
ABSOLUTE IMMATURE GRANULOCYTE: 0 K/UL (ref 0–0.3)
ABSOLUTE LYMPH #: 0.5 K/UL (ref 1–4.8)
ABSOLUTE MONO #: 0.5 K/UL (ref 0.2–0.8)
ANION GAP SERPL CALCULATED.3IONS-SCNC: 11 MMOL/L (ref 9–17)
BASOPHILS # BLD: 1 %
BASOPHILS ABSOLUTE: 0.06 K/UL (ref 0–0.2)
BUN BLDV-MCNC: 33 MG/DL (ref 8–23)
BUN/CREAT BLD: 17 (ref 9–20)
CALCIUM SERPL-MCNC: 8.7 MG/DL (ref 8.6–10.4)
CHLORIDE BLD-SCNC: 96 MMOL/L (ref 98–107)
CO2: 35 MMOL/L (ref 20–31)
CREAT SERPL-MCNC: 1.91 MG/DL (ref 0.5–0.9)
DIFFERENTIAL TYPE: ABNORMAL
EOSINOPHILS RELATIVE PERCENT: 1 % (ref 1–4)
GFR AFRICAN AMERICAN: 31 ML/MIN
GFR NON-AFRICAN AMERICAN: 26 ML/MIN
GFR SERPL CREATININE-BSD FRML MDRD: ABNORMAL ML/MIN/{1.73_M2}
GFR SERPL CREATININE-BSD FRML MDRD: ABNORMAL ML/MIN/{1.73_M2}
GLUCOSE BLD-MCNC: 105 MG/DL (ref 70–99)
GLUCOSE BLD-MCNC: 110 MG/DL (ref 65–105)
GLUCOSE BLD-MCNC: 113 MG/DL (ref 65–105)
GLUCOSE BLD-MCNC: 92 MG/DL (ref 65–105)
HCT VFR BLD CALC: 29 % (ref 36.3–47.1)
HEMOGLOBIN: 8.5 G/DL (ref 11.9–15.1)
IMMATURE GRANULOCYTES: 0 %
LYMPHOCYTES # BLD: 8 % (ref 24–44)
MCH RBC QN AUTO: 31.7 PG (ref 25.2–33.5)
MCHC RBC AUTO-ENTMCNC: 29.3 G/DL (ref 28.4–34.8)
MCV RBC AUTO: 108.2 FL (ref 82.6–102.9)
MONOCYTES # BLD: 8 % (ref 1–7)
MORPHOLOGY: ABNORMAL
NRBC AUTOMATED: 0 PER 100 WBC
PDW BLD-RTO: 14.6 % (ref 11.8–14.4)
PLATELET # BLD: 89 K/UL (ref 138–453)
PLATELET ESTIMATE: ABNORMAL
PMV BLD AUTO: 10.7 FL (ref 8.1–13.5)
POTASSIUM SERPL-SCNC: 4.4 MMOL/L (ref 3.7–5.3)
RBC # BLD: 2.68 M/UL (ref 3.95–5.11)
RBC # BLD: ABNORMAL 10*6/UL
SEG NEUTROPHILS: 82 % (ref 36–66)
SEGMENTED NEUTROPHILS ABSOLUTE COUNT: 5.18 K/UL (ref 1.8–7.7)
SODIUM BLD-SCNC: 142 MMOL/L (ref 135–144)
WBC # BLD: 6.3 K/UL (ref 3.5–11.3)
WBC # BLD: ABNORMAL 10*3/UL

## 2019-12-10 PROCEDURE — 80048 BASIC METABOLIC PNL TOTAL CA: CPT

## 2019-12-10 PROCEDURE — 2700000000 HC OXYGEN THERAPY PER DAY

## 2019-12-10 PROCEDURE — 85025 COMPLETE CBC W/AUTO DIFF WBC: CPT

## 2019-12-10 PROCEDURE — 99232 SBSQ HOSP IP/OBS MODERATE 35: CPT | Performed by: INTERNAL MEDICINE

## 2019-12-10 PROCEDURE — 97110 THERAPEUTIC EXERCISES: CPT

## 2019-12-10 PROCEDURE — 97530 THERAPEUTIC ACTIVITIES: CPT

## 2019-12-10 PROCEDURE — 94760 N-INVAS EAR/PLS OXIMETRY 1: CPT

## 2019-12-10 PROCEDURE — 36415 COLL VENOUS BLD VENIPUNCTURE: CPT

## 2019-12-10 PROCEDURE — 94640 AIRWAY INHALATION TREATMENT: CPT

## 2019-12-10 PROCEDURE — 6360000002 HC RX W HCPCS: Performed by: INTERNAL MEDICINE

## 2019-12-10 PROCEDURE — 97535 SELF CARE MNGMENT TRAINING: CPT

## 2019-12-10 PROCEDURE — 6370000000 HC RX 637 (ALT 250 FOR IP): Performed by: INTERNAL MEDICINE

## 2019-12-10 PROCEDURE — 2580000003 HC RX 258: Performed by: INTERNAL MEDICINE

## 2019-12-10 RX ORDER — CIPROFLOXACIN HYDROCHLORIDE 3.5 MG/ML
1 SOLUTION/ DROPS TOPICAL
Qty: 10 ML | Refills: 0 | Status: SHIPPED | OUTPATIENT
Start: 2019-12-10 | End: 2019-12-10

## 2019-12-10 RX ORDER — CIPROFLOXACIN HYDROCHLORIDE 3.5 MG/ML
1 SOLUTION/ DROPS TOPICAL
Qty: 10 ML | Refills: 0 | Status: SHIPPED | OUTPATIENT
Start: 2019-12-10 | End: 2019-12-15

## 2019-12-10 RX ADMIN — MOMETASONE FUROATE AND FORMOTEROL FUMARATE DIHYDRATE 2 PUFF: 200; 5 AEROSOL RESPIRATORY (INHALATION) at 07:29

## 2019-12-10 RX ADMIN — MORPHINE SULFATE 1 MG: 2 INJECTION, SOLUTION INTRAMUSCULAR; INTRAVENOUS at 07:48

## 2019-12-10 RX ADMIN — MEROPENEM 1 G: 1 INJECTION, POWDER, FOR SOLUTION INTRAVENOUS at 04:50

## 2019-12-10 RX ADMIN — ANTI-FUNGAL POWDER MICONAZOLE NITRATE TALC FREE: 1.42 POWDER TOPICAL at 10:15

## 2019-12-10 RX ADMIN — ROPINIROLE HYDROCHLORIDE 2 MG: 2 TABLET, FILM COATED ORAL at 13:13

## 2019-12-10 RX ADMIN — AMIODARONE HYDROCHLORIDE 200 MG: 200 TABLET ORAL at 10:03

## 2019-12-10 RX ADMIN — ROPINIROLE HYDROCHLORIDE 2 MG: 2 TABLET, FILM COATED ORAL at 10:03

## 2019-12-10 RX ADMIN — CIPROFLOXACIN 1 DROP: 3 SOLUTION OPHTHALMIC at 10:15

## 2019-12-10 RX ADMIN — MORPHINE SULFATE 1 MG: 2 INJECTION, SOLUTION INTRAMUSCULAR; INTRAVENOUS at 11:10

## 2019-12-10 RX ADMIN — CARBIDOPA AND LEVODOPA 1 TABLET: 25; 100 TABLET, EXTENDED RELEASE ORAL at 13:14

## 2019-12-10 RX ADMIN — ALBUTEROL SULFATE 2.5 MG: 2.5 SOLUTION RESPIRATORY (INHALATION) at 10:58

## 2019-12-10 RX ADMIN — CALCITRIOL 0.25 MCG: 0.25 CAPSULE ORAL at 10:03

## 2019-12-10 RX ADMIN — MEROPENEM 1 G: 1 INJECTION, POWDER, FOR SOLUTION INTRAVENOUS at 15:20

## 2019-12-10 RX ADMIN — ALBUTEROL SULFATE 2.5 MG: 2.5 SOLUTION RESPIRATORY (INHALATION) at 07:27

## 2019-12-10 RX ADMIN — CARBIDOPA AND LEVODOPA 1 TABLET: 25; 100 TABLET, EXTENDED RELEASE ORAL at 10:30

## 2019-12-10 RX ADMIN — FUROSEMIDE 20 MG: 20 TABLET ORAL at 10:04

## 2019-12-10 RX ADMIN — PANTOPRAZOLE SODIUM 40 MG: 40 TABLET, DELAYED RELEASE ORAL at 06:21

## 2019-12-10 RX ADMIN — LINAGLIPTIN 2.5 MG: 5 TABLET, FILM COATED ORAL at 10:04

## 2019-12-10 RX ADMIN — RASAGILINE 1 MG: 0.5 TABLET ORAL at 10:05

## 2019-12-10 RX ADMIN — CIPROFLOXACIN 1 DROP: 3 SOLUTION OPHTHALMIC at 13:14

## 2019-12-10 RX ADMIN — CALCIUM ACETATE 667 MG: 667 CAPSULE ORAL at 11:57

## 2019-12-10 RX ADMIN — ASPIRIN 81 MG: 81 TABLET, COATED ORAL at 10:03

## 2019-12-10 RX ADMIN — METOPROLOL TARTRATE 25 MG: 25 TABLET ORAL at 10:03

## 2019-12-10 RX ADMIN — APIXABAN 2.5 MG: 2.5 TABLET, FILM COATED ORAL at 10:05

## 2019-12-10 RX ADMIN — CALCIUM ACETATE 667 MG: 667 CAPSULE ORAL at 10:22

## 2019-12-10 RX ADMIN — MORPHINE SULFATE 1 MG: 2 INJECTION, SOLUTION INTRAMUSCULAR; INTRAVENOUS at 13:13

## 2019-12-10 RX ADMIN — CIPROFLOXACIN 1 DROP: 3 SOLUTION OPHTHALMIC at 06:22

## 2019-12-10 RX ADMIN — ACETAMINOPHEN 500 MG: 500 TABLET ORAL at 10:03

## 2019-12-10 ASSESSMENT — PAIN DESCRIPTION - FREQUENCY
FREQUENCY: CONTINUOUS

## 2019-12-10 ASSESSMENT — PAIN SCALES - GENERAL
PAINLEVEL_OUTOF10: 6
PAINLEVEL_OUTOF10: 8
PAINLEVEL_OUTOF10: 6
PAINLEVEL_OUTOF10: 8
PAINLEVEL_OUTOF10: 7
PAINLEVEL_OUTOF10: 7
PAINLEVEL_OUTOF10: 8
PAINLEVEL_OUTOF10: 6

## 2019-12-10 ASSESSMENT — PAIN DESCRIPTION - LOCATION
LOCATION: GENERALIZED

## 2019-12-10 ASSESSMENT — PAIN DESCRIPTION - DESCRIPTORS
DESCRIPTORS: PRESSURE

## 2019-12-10 ASSESSMENT — PAIN DESCRIPTION - PAIN TYPE
TYPE: CHRONIC PAIN

## 2019-12-10 ASSESSMENT — PAIN DESCRIPTION - ORIENTATION
ORIENTATION: LOWER

## 2019-12-10 ASSESSMENT — ENCOUNTER SYMPTOMS
GASTROINTESTINAL NEGATIVE: 1
RESPIRATORY NEGATIVE: 1

## 2019-12-11 ENCOUNTER — CARE COORDINATION (OUTPATIENT)
Dept: CASE MANAGEMENT | Age: 74
End: 2019-12-11

## 2019-12-13 ENCOUNTER — HOSPITAL ENCOUNTER (OUTPATIENT)
Age: 74
Setting detail: SPECIMEN
Discharge: HOME | End: 2019-12-13
Payer: COMMERCIAL

## 2019-12-13 LAB
ABSOLUTE EOS #: 0.81 K/UL (ref 0–0.44)
ABSOLUTE IMMATURE GRANULOCYTE: 0.08 K/UL (ref 0–0.3)
ABSOLUTE LYMPH #: 0.9 K/UL (ref 1.1–3.7)
ABSOLUTE MONO #: 0.76 K/UL (ref 0.1–1.2)
ANION GAP SERPL CALCULATED.3IONS-SCNC: 9 MMOL/L (ref 9–17)
BASOPHILS # BLD: 1 % (ref 0–2)
BASOPHILS ABSOLUTE: 0.04 K/UL (ref 0–0.2)
BUN BLDV-MCNC: 34 MG/DL (ref 8–23)
BUN/CREAT BLD: 24 (ref 9–20)
CALCIUM SERPL-MCNC: 9.4 MG/DL (ref 8.6–10.4)
CHLORIDE BLD-SCNC: 95 MMOL/L (ref 98–107)
CO2: 39 MMOL/L (ref 20–31)
CREAT SERPL-MCNC: 1.4 MG/DL (ref 0.5–0.9)
DIFFERENTIAL TYPE: ABNORMAL
EOSINOPHILS RELATIVE PERCENT: 12 % (ref 1–4)
GFR AFRICAN AMERICAN: 45 ML/MIN
GFR NON-AFRICAN AMERICAN: 37 ML/MIN
GFR SERPL CREATININE-BSD FRML MDRD: ABNORMAL ML/MIN/{1.73_M2}
GFR SERPL CREATININE-BSD FRML MDRD: ABNORMAL ML/MIN/{1.73_M2}
GLUCOSE BLD-MCNC: 93 MG/DL (ref 70–99)
HCT VFR BLD CALC: 31.2 % (ref 36.3–47.1)
HEMOGLOBIN: 9.2 G/DL (ref 11.9–15.1)
IMMATURE GRANULOCYTES: 1 %
LYMPHOCYTES # BLD: 13 % (ref 24–43)
MCH RBC QN AUTO: 32.2 PG (ref 25.2–33.5)
MCHC RBC AUTO-ENTMCNC: 29.5 G/DL (ref 28.4–34.8)
MCV RBC AUTO: 109.1 FL (ref 82.6–102.9)
MONOCYTES # BLD: 11 % (ref 3–12)
NRBC AUTOMATED: 0 PER 100 WBC
PDW BLD-RTO: 14.3 % (ref 11.8–14.4)
PLATELET # BLD: 135 K/UL (ref 138–453)
PLATELET ESTIMATE: ABNORMAL
PMV BLD AUTO: 11.2 FL (ref 8.1–13.5)
POTASSIUM SERPL-SCNC: 4.2 MMOL/L (ref 3.7–5.3)
RBC # BLD: 2.86 M/UL (ref 3.95–5.11)
RBC # BLD: ABNORMAL 10*6/UL
SEG NEUTROPHILS: 63 % (ref 36–65)
SEGMENTED NEUTROPHILS ABSOLUTE COUNT: 4.37 K/UL (ref 1.5–8.1)
SODIUM BLD-SCNC: 143 MMOL/L (ref 135–144)
WBC # BLD: 7 K/UL (ref 3.5–11.3)
WBC # BLD: ABNORMAL 10*3/UL

## 2019-12-13 PROCEDURE — 85025 COMPLETE CBC W/AUTO DIFF WBC: CPT

## 2019-12-13 PROCEDURE — 80048 BASIC METABOLIC PNL TOTAL CA: CPT

## 2019-12-17 ENCOUNTER — CARE COORDINATION (OUTPATIENT)
Dept: CASE MANAGEMENT | Age: 74
End: 2019-12-17

## 2019-12-18 ENCOUNTER — CARE COORDINATION (OUTPATIENT)
Dept: CASE MANAGEMENT | Age: 74
End: 2019-12-18

## 2019-12-19 NOTE — PROGRESS NOTES
Pt admitted to room 2001 per cart in stable condition from ED  VSS  Oriented to room and surroundings  Bed in lowest position, wheels locked, 2/4 side rails up  Call light in reach, room free of clutter, adequate lighting provided  Pt is not a current smoker   Denies any further questions at this time  Instructed to call out with any questions/concerns/new onset of pain and/or n/v   White board updated  Continue to monitor with hourly rounding  STAY WITH ME protocol initiated   Bed alarm on  Signs in place  Fall risk sticker to wrist band  Non-skid socks on/at bedside  [address] in place for patient/family to view & ask questions. WDL

## 2019-12-26 ENCOUNTER — HOSPITAL ENCOUNTER (OUTPATIENT)
Facility: MEDICAL CENTER | Age: 74
End: 2019-12-26
Payer: MEDICARE

## 2019-12-27 ENCOUNTER — TELEPHONE (OUTPATIENT)
Dept: ONCOLOGY | Age: 74
End: 2019-12-27

## 2019-12-30 ENCOUNTER — APPOINTMENT (OUTPATIENT)
Dept: GENERAL RADIOLOGY | Age: 74
End: 2019-12-30
Payer: COMMERCIAL

## 2019-12-30 ENCOUNTER — HOSPITAL ENCOUNTER (OUTPATIENT)
Age: 74
Setting detail: OBSERVATION
Discharge: HOME | End: 2020-01-02
Attending: EMERGENCY MEDICINE | Admitting: INTERNAL MEDICINE
Payer: COMMERCIAL

## 2019-12-30 LAB
ABSOLUTE EOS #: 0.62 K/UL (ref 0–0.44)
ABSOLUTE IMMATURE GRANULOCYTE: 0.03 K/UL (ref 0–0.3)
ABSOLUTE LYMPH #: 1.19 K/UL (ref 1.1–3.7)
ABSOLUTE MONO #: 0.69 K/UL (ref 0.1–1.2)
ANION GAP SERPL CALCULATED.3IONS-SCNC: 12 MMOL/L (ref 9–17)
BASOPHILS # BLD: 0 % (ref 0–2)
BASOPHILS ABSOLUTE: 0.03 K/UL (ref 0–0.2)
BUN BLDV-MCNC: 38 MG/DL (ref 8–23)
BUN/CREAT BLD: 24 (ref 9–20)
CALCIUM SERPL-MCNC: 8.9 MG/DL (ref 8.6–10.4)
CHLORIDE BLD-SCNC: 94 MMOL/L (ref 98–107)
CO2: 34 MMOL/L (ref 20–31)
CREAT SERPL-MCNC: 1.58 MG/DL (ref 0.5–0.9)
DIFFERENTIAL TYPE: ABNORMAL
EOSINOPHILS RELATIVE PERCENT: 8 % (ref 1–4)
GFR AFRICAN AMERICAN: 39 ML/MIN
GFR NON-AFRICAN AMERICAN: 32 ML/MIN
GFR SERPL CREATININE-BSD FRML MDRD: ABNORMAL ML/MIN/{1.73_M2}
GFR SERPL CREATININE-BSD FRML MDRD: ABNORMAL ML/MIN/{1.73_M2}
GLUCOSE BLD-MCNC: 130 MG/DL (ref 70–99)
HCT VFR BLD CALC: 32.1 % (ref 36.3–47.1)
HEMOGLOBIN: 9.7 G/DL (ref 11.9–15.1)
IMMATURE GRANULOCYTES: 0 %
INR BLD: 1
LYMPHOCYTES # BLD: 16 % (ref 24–43)
MCH RBC QN AUTO: 32.7 PG (ref 25.2–33.5)
MCHC RBC AUTO-ENTMCNC: 30.2 G/DL (ref 28.4–34.8)
MCV RBC AUTO: 108.1 FL (ref 82.6–102.9)
MONOCYTES # BLD: 9 % (ref 3–12)
NRBC AUTOMATED: 0 PER 100 WBC
PARTIAL THROMBOPLASTIN TIME: 27.5 SEC (ref 23–31)
PDW BLD-RTO: 14.5 % (ref 11.8–14.4)
PLATELET # BLD: 85 K/UL (ref 138–453)
PLATELET ESTIMATE: ABNORMAL
PMV BLD AUTO: 10.7 FL (ref 8.1–13.5)
POTASSIUM SERPL-SCNC: 4.5 MMOL/L (ref 3.7–5.3)
PROTHROMBIN TIME: 10 SEC (ref 9.7–11.6)
RBC # BLD: 2.97 M/UL (ref 3.95–5.11)
RBC # BLD: ABNORMAL 10*6/UL
SEG NEUTROPHILS: 67 % (ref 36–65)
SEGMENTED NEUTROPHILS ABSOLUTE COUNT: 5.02 K/UL (ref 1.5–8.1)
SODIUM BLD-SCNC: 140 MMOL/L (ref 135–144)
TROPONIN INTERP: ABNORMAL
TROPONIN INTERP: ABNORMAL
TROPONIN T: ABNORMAL NG/ML
TROPONIN T: ABNORMAL NG/ML
TROPONIN, HIGH SENSITIVITY: 41 NG/L (ref 0–14)
TROPONIN, HIGH SENSITIVITY: 43 NG/L (ref 0–14)
WBC # BLD: 7.6 K/UL (ref 3.5–11.3)
WBC # BLD: ABNORMAL 10*3/UL

## 2019-12-30 PROCEDURE — G0378 HOSPITAL OBSERVATION PER HR: HCPCS

## 2019-12-30 PROCEDURE — 83036 HEMOGLOBIN GLYCOSYLATED A1C: CPT

## 2019-12-30 PROCEDURE — 71045 X-RAY EXAM CHEST 1 VIEW: CPT

## 2019-12-30 PROCEDURE — 93005 ELECTROCARDIOGRAM TRACING: CPT | Performed by: EMERGENCY MEDICINE

## 2019-12-30 PROCEDURE — 99285 EMERGENCY DEPT VISIT HI MDM: CPT

## 2019-12-30 PROCEDURE — 80048 BASIC METABOLIC PNL TOTAL CA: CPT

## 2019-12-30 PROCEDURE — 96375 TX/PRO/DX INJ NEW DRUG ADDON: CPT

## 2019-12-30 PROCEDURE — 36415 COLL VENOUS BLD VENIPUNCTURE: CPT

## 2019-12-30 PROCEDURE — 96374 THER/PROPH/DIAG INJ IV PUSH: CPT

## 2019-12-30 PROCEDURE — 2060000000 HC ICU INTERMEDIATE R&B

## 2019-12-30 PROCEDURE — 6370000000 HC RX 637 (ALT 250 FOR IP): Performed by: EMERGENCY MEDICINE

## 2019-12-30 PROCEDURE — 85730 THROMBOPLASTIN TIME PARTIAL: CPT

## 2019-12-30 PROCEDURE — 2580000003 HC RX 258: Performed by: INTERNAL MEDICINE

## 2019-12-30 PROCEDURE — 84484 ASSAY OF TROPONIN QUANT: CPT

## 2019-12-30 PROCEDURE — 85610 PROTHROMBIN TIME: CPT

## 2019-12-30 PROCEDURE — 6360000002 HC RX W HCPCS: Performed by: EMERGENCY MEDICINE

## 2019-12-30 PROCEDURE — 85025 COMPLETE CBC W/AUTO DIFF WBC: CPT

## 2019-12-30 RX ORDER — ATORVASTATIN CALCIUM 10 MG/1
10 TABLET, FILM COATED ORAL DAILY
Status: DISCONTINUED | OUTPATIENT
Start: 2019-12-31 | End: 2020-01-02 | Stop reason: HOSPADM

## 2019-12-30 RX ORDER — ASPIRIN 81 MG/1
81 TABLET ORAL DAILY
Status: DISCONTINUED | OUTPATIENT
Start: 2019-12-31 | End: 2020-01-02 | Stop reason: HOSPADM

## 2019-12-30 RX ORDER — RASAGILINE 0.5 MG/1
1 TABLET ORAL DAILY
Status: DISCONTINUED | OUTPATIENT
Start: 2019-12-31 | End: 2020-01-02 | Stop reason: HOSPADM

## 2019-12-30 RX ORDER — MORPHINE SULFATE 2 MG/ML
2 INJECTION, SOLUTION INTRAMUSCULAR; INTRAVENOUS ONCE
Status: COMPLETED | OUTPATIENT
Start: 2019-12-30 | End: 2019-12-30

## 2019-12-30 RX ORDER — ACETAMINOPHEN 325 MG/1
650 TABLET ORAL ONCE
Status: COMPLETED | OUTPATIENT
Start: 2019-12-30 | End: 2019-12-30

## 2019-12-30 RX ORDER — CARBIDOPA AND LEVODOPA 25; 100 MG/1; MG/1
1 TABLET, EXTENDED RELEASE ORAL 4 TIMES DAILY
Status: DISCONTINUED | OUTPATIENT
Start: 2019-12-31 | End: 2020-01-02 | Stop reason: HOSPADM

## 2019-12-30 RX ORDER — FUROSEMIDE 20 MG/1
20 TABLET ORAL DAILY
Status: DISCONTINUED | OUTPATIENT
Start: 2019-12-31 | End: 2020-01-02 | Stop reason: HOSPADM

## 2019-12-30 RX ORDER — SODIUM CHLORIDE 0.9 % (FLUSH) 0.9 %
10 SYRINGE (ML) INJECTION EVERY 12 HOURS SCHEDULED
Status: DISCONTINUED | OUTPATIENT
Start: 2019-12-30 | End: 2019-12-31 | Stop reason: SDUPTHER

## 2019-12-30 RX ORDER — LANOLIN ALCOHOL/MO/W.PET/CERES
5 CREAM (GRAM) TOPICAL NIGHTLY PRN
Status: DISCONTINUED | OUTPATIENT
Start: 2019-12-31 | End: 2020-01-02 | Stop reason: HOSPADM

## 2019-12-30 RX ORDER — PANTOPRAZOLE SODIUM 40 MG/1
40 TABLET, DELAYED RELEASE ORAL
Status: DISCONTINUED | OUTPATIENT
Start: 2019-12-31 | End: 2020-01-02 | Stop reason: HOSPADM

## 2019-12-30 RX ORDER — ONDANSETRON 2 MG/ML
4 INJECTION INTRAMUSCULAR; INTRAVENOUS EVERY 6 HOURS PRN
Status: DISCONTINUED | OUTPATIENT
Start: 2019-12-30 | End: 2019-12-31 | Stop reason: SDUPTHER

## 2019-12-30 RX ORDER — ALBUTEROL SULFATE 2.5 MG/3ML
2.5 SOLUTION RESPIRATORY (INHALATION) 4 TIMES DAILY
Status: DISCONTINUED | OUTPATIENT
Start: 2019-12-31 | End: 2020-01-02 | Stop reason: HOSPADM

## 2019-12-30 RX ORDER — TRAZODONE HYDROCHLORIDE 50 MG/1
50 TABLET ORAL NIGHTLY
Status: DISCONTINUED | OUTPATIENT
Start: 2019-12-31 | End: 2020-01-02 | Stop reason: HOSPADM

## 2019-12-30 RX ORDER — SODIUM CHLORIDE 0.9 % (FLUSH) 0.9 %
10 SYRINGE (ML) INJECTION EVERY 12 HOURS SCHEDULED
Status: DISCONTINUED | OUTPATIENT
Start: 2019-12-31 | End: 2020-01-02 | Stop reason: HOSPADM

## 2019-12-30 RX ORDER — LANOLIN ALCOHOL/MO/W.PET/CERES
325 CREAM (GRAM) TOPICAL 2 TIMES DAILY WITH MEALS
Status: DISCONTINUED | OUTPATIENT
Start: 2019-12-31 | End: 2020-01-02 | Stop reason: HOSPADM

## 2019-12-30 RX ORDER — DEXTROSE MONOHYDRATE 25 G/50ML
12.5 INJECTION, SOLUTION INTRAVENOUS PRN
Status: DISCONTINUED | OUTPATIENT
Start: 2019-12-30 | End: 2020-01-02 | Stop reason: HOSPADM

## 2019-12-30 RX ORDER — ACETAMINOPHEN 325 MG/1
650 TABLET ORAL EVERY 4 HOURS PRN
Status: DISCONTINUED | OUTPATIENT
Start: 2019-12-30 | End: 2020-01-02 | Stop reason: HOSPADM

## 2019-12-30 RX ORDER — ONDANSETRON 2 MG/ML
4 INJECTION INTRAMUSCULAR; INTRAVENOUS ONCE
Status: COMPLETED | OUTPATIENT
Start: 2019-12-30 | End: 2019-12-30

## 2019-12-30 RX ORDER — CALCITRIOL 0.25 UG/1
0.25 CAPSULE, LIQUID FILLED ORAL DAILY
Status: DISCONTINUED | OUTPATIENT
Start: 2019-12-31 | End: 2020-01-02 | Stop reason: HOSPADM

## 2019-12-30 RX ORDER — SODIUM CHLORIDE 0.9 % (FLUSH) 0.9 %
10 SYRINGE (ML) INJECTION PRN
Status: DISCONTINUED | OUTPATIENT
Start: 2019-12-30 | End: 2020-01-02 | Stop reason: HOSPADM

## 2019-12-30 RX ORDER — NICOTINE POLACRILEX 4 MG
15 LOZENGE BUCCAL PRN
Status: DISCONTINUED | OUTPATIENT
Start: 2019-12-30 | End: 2020-01-02 | Stop reason: HOSPADM

## 2019-12-30 RX ORDER — SODIUM CHLORIDE 0.9 % (FLUSH) 0.9 %
10 SYRINGE (ML) INJECTION PRN
Status: DISCONTINUED | OUTPATIENT
Start: 2019-12-30 | End: 2019-12-31 | Stop reason: SDUPTHER

## 2019-12-30 RX ORDER — AMIODARONE HYDROCHLORIDE 200 MG/1
200 TABLET ORAL DAILY
Status: DISCONTINUED | OUTPATIENT
Start: 2019-12-31 | End: 2020-01-02 | Stop reason: HOSPADM

## 2019-12-30 RX ORDER — ROPINIROLE 2 MG/1
2 TABLET, FILM COATED ORAL 3 TIMES DAILY
Status: DISCONTINUED | OUTPATIENT
Start: 2019-12-31 | End: 2020-01-02 | Stop reason: HOSPADM

## 2019-12-30 RX ORDER — DEXTROSE MONOHYDRATE 50 MG/ML
100 INJECTION, SOLUTION INTRAVENOUS PRN
Status: DISCONTINUED | OUTPATIENT
Start: 2019-12-30 | End: 2020-01-02 | Stop reason: HOSPADM

## 2019-12-30 RX ORDER — SENNA PLUS 8.6 MG/1
2 TABLET ORAL NIGHTLY
Status: DISCONTINUED | OUTPATIENT
Start: 2019-12-31 | End: 2020-01-02 | Stop reason: HOSPADM

## 2019-12-30 RX ADMIN — SODIUM CHLORIDE, PRESERVATIVE FREE 10 ML: 5 INJECTION INTRAVENOUS at 21:13

## 2019-12-30 RX ADMIN — ACETAMINOPHEN 650 MG: 325 TABLET ORAL at 21:07

## 2019-12-30 RX ADMIN — ONDANSETRON 4 MG: 2 INJECTION INTRAMUSCULAR; INTRAVENOUS at 19:06

## 2019-12-30 RX ADMIN — MORPHINE SULFATE 2 MG: 2 INJECTION, SOLUTION INTRAMUSCULAR; INTRAVENOUS at 19:06

## 2019-12-30 ASSESSMENT — PAIN SCALES - GENERAL
PAINLEVEL_OUTOF10: 10
PAINLEVEL_OUTOF10: 10
PAINLEVEL_OUTOF10: 6
PAINLEVEL_OUTOF10: 6
PAINLEVEL_OUTOF10: 10

## 2019-12-30 ASSESSMENT — PAIN DESCRIPTION - PAIN TYPE: TYPE: ACUTE PAIN

## 2019-12-30 ASSESSMENT — PAIN DESCRIPTION - DESCRIPTORS: DESCRIPTORS: SQUEEZING

## 2019-12-30 ASSESSMENT — PAIN DESCRIPTION - PROGRESSION: CLINICAL_PROGRESSION: NOT CHANGED

## 2019-12-30 ASSESSMENT — PAIN DESCRIPTION - ONSET: ONSET: ON-GOING

## 2019-12-30 ASSESSMENT — ENCOUNTER SYMPTOMS
ABDOMINAL DISTENTION: 0
SHORTNESS OF BREATH: 0
CHEST TIGHTNESS: 0
ABDOMINAL PAIN: 0
BACK PAIN: 0
EYE DISCHARGE: 0
EYE PAIN: 0
FACIAL SWELLING: 0

## 2019-12-30 ASSESSMENT — PAIN DESCRIPTION - LOCATION
LOCATION: LEG
LOCATION: LEG

## 2019-12-30 ASSESSMENT — PAIN DESCRIPTION - ORIENTATION: ORIENTATION: RIGHT;LEFT

## 2019-12-30 ASSESSMENT — PAIN DESCRIPTION - FREQUENCY: FREQUENCY: CONTINUOUS

## 2019-12-30 NOTE — ED NOTES
Pt presents to er via ems with c/o chest pain. Pt states pain started prior to arrival. Pt states she was sitting down for dinner when pain started. Pt denies radiation of pain. Pt denies recent fever or chills. Pt states she is always sob d/t copd. Pt a&ox3. Skin warm and dry. Respirations labored.       Mark Frazier RN  12/30/19 [id number]

## 2019-12-30 NOTE — ED PROVIDER NOTES
EMERGENCY DEPARTMENT ENCOUNTER    Pt Name: Juan Merchant  MRN: [id number]  [place] 1945  Date of evaluation: 12/30/19  CHIEF COMPLAINT       Chief Complaint   Patient presents with    Chest Pain     HISTORY OF PRESENT ILLNESS   HPI   The patient a 79-year-old female with a history of congestive heart failure, COPD and hypertension who presented to the emergency department via EMS secondary to chest pain. Patient complains of sudden onset of chest pain that started at 5:00 PM while she was working on her computer. Localized to the midsternal region 6 out of 10 on the pain scale nonradiating no associated nausea vomiting or diaphoresis. EMS was called patient received aspirin and nitro which resolved the pain somewhat. REVIEW OF SYSTEMS     Review of Systems   Constitutional: Negative for chills, diaphoresis and fever. HENT: Negative for congestion, ear pain and facial swelling. Eyes: Negative for pain, discharge and visual disturbance. Respiratory: Negative for chest tightness and shortness of breath. Cardiovascular: Positive for chest pain. Negative for palpitations. Gastrointestinal: Negative for abdominal distention and abdominal pain. Genitourinary: Negative for difficulty urinating and flank pain. Musculoskeletal: Negative for back pain. Skin: Negative for wound. Neurological: Negative for dizziness, light-headedness and headaches.      PASTMEDICAL HISTORY     Past Medical History:   Diagnosis Date    Acute on chronic diastolic congestive heart failure ([address]     Anesthesia complication     DIFFICULTY WAKING OP    Asthma     Atrial fibrillation ([address] 2013    Cataracts, bilateral     Cellulitis     BILAT LOWER LEGS-PT STATES IS IMPROVING    Cerebral artery occlusion with cerebral infarction [facility])     Last stroke was February 2017 w/no deficits-TOTAL OF 3    CHF (congestive heart failure) (HCC)     Chronic kidney disease 2013    NOT ON DIALYSIS YET    Chronic kidney disease placed. All patient's question's and concerns were answered prior to disposition and patient and/or family expressed understanding and agreement of treatment plan. CRITICAL CARE:              NIH STROKE SCALE:            PROCEDURES:    Procedures    DIAGNOSTIC RESULTS   EKG:All EKG's are interpreted by the Emergency Department Physician who either signs or Co-signs this chart in the absence of a cardiologist.        RADIOLOGY:All plain film, CT, MRI, and formal ultrasound images (except ED bedside ultrasound) are read by the radiologist, see reports below, unless otherwisenoted in MDM or here. XR CHEST PORTABLE   Final Result   No acute findings. LABS: All lab results were reviewed by myself, and all abnormals are listed below.   Labs Reviewed   CBC WITH AUTO DIFFERENTIAL - Abnormal; Notable for the following components:       Result Value    RBC 2.97 (*)     Hemoglobin 9.7 (*)     Hematocrit 32.1 (*)     .1 (*)     RDW 14.5 (*)     Platelets 85 (*)     Seg Neutrophils 67 (*)     Lymphocytes 16 (*)     Eosinophils % 8 (*)     Absolute Eos # 0.62 (*)     All other components within normal limits   BASIC METABOLIC PANEL W/ REFLEX TO MG FOR LOW K - Abnormal; Notable for the following components:    Glucose 130 (*)     BUN 38 (*)     CREATININE 1.58 (*)     Bun/Cre Ratio 24 (*)     Chloride 94 (*)     CO2 34 (*)     GFR Non- 32 (*)     GFR  39 (*)     All other components within normal limits   TROPONIN - Abnormal; Notable for the following components:    Troponin, High Sensitivity 41 (*)     All other components within normal limits   PROTIME-INR   APTT   TROPONIN   TROPONIN       EMERGENCY DEPARTMENTCOURSE:         Vitals:    Vitals:    12/30/19 1850   BP: 133/65   Pulse: 79   Resp: 16   Temp: 98.2 °F (36.8 °C)   TempSrc: Oral   SpO2: 96%   Weight: 181 lb (82.1 kg)   Height: 5' 1\" (1.549 m)       The patient was given the following medications while in the emergency department:  Orders Placed This Encounter   Medications    morphine (PF) injection 2 mg    ondansetron (ZOFRAN) injection 4 mg    acetaminophen (TYLENOL) tablet 650 mg    sodium chloride flush 0.9 % injection 10 mL    sodium chloride flush 0.9 % injection 10 mL    acetaminophen (TYLENOL) tablet 650 mg     CONSULTS:  IP CONSULT TO INTERNAL MEDICINE    FINAL IMPRESSION      1. Chest pain, unspecified type          DISPOSITION/PLAN   DISPOSITION Admitted 12/30/2019 08:57:54 PM      PATIENT REFERRED TO:  Lottie Trinidad MD  [address] [place] [address] [phone]          DISCHARGE MEDICATIONS:  New Prescriptions    No medications on file     Brien Alfonso MD  Attending Emergency Physician    This note was created with the assistance of a speech-recognition program. While intending to generate a document that actually reflects the content of the visit, no guarantees can be provided that every mistake has been identified and corrected by editing.                     Brien Alfonso MD  32/45/59 [id number]

## 2019-12-31 LAB
-: ABNORMAL
ABSOLUTE EOS #: 0.63 K/UL (ref 0–0.44)
ABSOLUTE IMMATURE GRANULOCYTE: 0.03 K/UL (ref 0–0.3)
ABSOLUTE LYMPH #: 1.16 K/UL (ref 1.1–3.7)
ABSOLUTE MONO #: 0.62 K/UL (ref 0.1–1.2)
ALBUMIN SERPL-MCNC: 4 G/DL (ref 3.5–5.2)
ALBUMIN/GLOBULIN RATIO: ABNORMAL (ref 1–2.5)
ALP BLD-CCNC: 57 U/L (ref 35–104)
ALT SERPL-CCNC: 6 U/L (ref 5–33)
AMORPHOUS: ABNORMAL
ANION GAP SERPL CALCULATED.3IONS-SCNC: 9 MMOL/L (ref 9–17)
AST SERPL-CCNC: 10 U/L
BACTERIA: ABNORMAL
BASOPHILS # BLD: 0 % (ref 0–2)
BASOPHILS ABSOLUTE: 0.03 K/UL (ref 0–0.2)
BILIRUB SERPL-MCNC: 0.13 MG/DL (ref 0.3–1.2)
BILIRUBIN URINE: NEGATIVE
BUN BLDV-MCNC: 34 MG/DL (ref 8–23)
BUN/CREAT BLD: 21 (ref 9–20)
CALCIUM SERPL-MCNC: 8.4 MG/DL (ref 8.6–10.4)
CASTS UA: ABNORMAL /LPF
CHLORIDE BLD-SCNC: 97 MMOL/L (ref 98–107)
CO2: 35 MMOL/L (ref 20–31)
COLOR: YELLOW
COMMENT UA: ABNORMAL
CREAT SERPL-MCNC: 1.63 MG/DL (ref 0.5–0.9)
CRYSTALS, UA: ABNORMAL /HPF
DIFFERENTIAL TYPE: ABNORMAL
EOSINOPHILS RELATIVE PERCENT: 9 % (ref 1–4)
EPITHELIAL CELLS UA: ABNORMAL /HPF (ref 0–5)
ESTIMATED AVERAGE GLUCOSE: 128 MG/DL
GFR AFRICAN AMERICAN: 38 ML/MIN
GFR NON-AFRICAN AMERICAN: 31 ML/MIN
GFR SERPL CREATININE-BSD FRML MDRD: ABNORMAL ML/MIN/{1.73_M2}
GFR SERPL CREATININE-BSD FRML MDRD: ABNORMAL ML/MIN/{1.73_M2}
GLUCOSE BLD-MCNC: 115 MG/DL (ref 70–99)
GLUCOSE BLD-MCNC: 136 MG/DL (ref 65–105)
GLUCOSE BLD-MCNC: 148 MG/DL (ref 65–105)
GLUCOSE BLD-MCNC: 96 MG/DL (ref 65–105)
GLUCOSE URINE: NEGATIVE
HBA1C MFR BLD: 6.1 % (ref 4–6)
HCT VFR BLD CALC: 28.2 % (ref 36.3–47.1)
HEMOGLOBIN: 8.3 G/DL (ref 11.9–15.1)
IMMATURE GRANULOCYTES: 0 %
IRON SATURATION: 14 % (ref 20–55)
IRON: 31 UG/DL (ref 37–145)
KETONES, URINE: NEGATIVE
LEUKOCYTE ESTERASE, URINE: NEGATIVE
LV EF: 65 %
LVEF MODALITY: NORMAL
LYMPHOCYTES # BLD: 17 % (ref 24–43)
MCH RBC QN AUTO: 31.9 PG (ref 25.2–33.5)
MCHC RBC AUTO-ENTMCNC: 29.4 G/DL (ref 28.4–34.8)
MCV RBC AUTO: 108.5 FL (ref 82.6–102.9)
MONOCYTES # BLD: 9 % (ref 3–12)
MUCUS: ABNORMAL
NITRITE, URINE: NEGATIVE
NRBC AUTOMATED: 0 PER 100 WBC
OTHER OBSERVATIONS UA: ABNORMAL
PDW BLD-RTO: 14.6 % (ref 11.8–14.4)
PH UA: 7 (ref 5–8)
PLATELET # BLD: 71 K/UL (ref 138–453)
PLATELET ESTIMATE: ABNORMAL
PMV BLD AUTO: 11.3 FL (ref 8.1–13.5)
POTASSIUM SERPL-SCNC: 4.7 MMOL/L (ref 3.7–5.3)
PROTEIN UA: NEGATIVE
RBC # BLD: 2.6 M/UL (ref 3.95–5.11)
RBC # BLD: ABNORMAL 10*6/UL
RBC UA: ABNORMAL /HPF (ref 0–2)
RENAL EPITHELIAL, UA: ABNORMAL /HPF
SEG NEUTROPHILS: 65 % (ref 36–65)
SEGMENTED NEUTROPHILS ABSOLUTE COUNT: 4.23 K/UL (ref 1.5–8.1)
SODIUM BLD-SCNC: 141 MMOL/L (ref 135–144)
SPECIFIC GRAVITY UA: 1.01 (ref 1–1.03)
TOTAL IRON BINDING CAPACITY: 218 UG/DL (ref 250–450)
TOTAL PROTEIN: 6.5 G/DL (ref 6.4–8.3)
TRICHOMONAS: ABNORMAL
TROPONIN INTERP: ABNORMAL
TROPONIN T: ABNORMAL NG/ML
TROPONIN, HIGH SENSITIVITY: 35 NG/L (ref 0–14)
TROPONIN, HIGH SENSITIVITY: 39 NG/L (ref 0–14)
TROPONIN, HIGH SENSITIVITY: 43 NG/L (ref 0–14)
TURBIDITY: CLEAR
UNSATURATED IRON BINDING CAPACITY: 187 UG/DL (ref 112–347)
URINE HGB: ABNORMAL
UROBILINOGEN, URINE: NORMAL
WBC # BLD: 6.7 K/UL (ref 3.5–11.3)
WBC # BLD: ABNORMAL 10*3/UL
WBC UA: ABNORMAL /HPF (ref 0–5)
YEAST: ABNORMAL

## 2019-12-31 PROCEDURE — 81001 URINALYSIS AUTO W/SCOPE: CPT

## 2019-12-31 PROCEDURE — 82947 ASSAY GLUCOSE BLOOD QUANT: CPT

## 2019-12-31 PROCEDURE — 36415 COLL VENOUS BLD VENIPUNCTURE: CPT

## 2019-12-31 PROCEDURE — G0378 HOSPITAL OBSERVATION PER HR: HCPCS

## 2019-12-31 PROCEDURE — 85025 COMPLETE CBC W/AUTO DIFF WBC: CPT

## 2019-12-31 PROCEDURE — 80053 COMPREHEN METABOLIC PANEL: CPT

## 2019-12-31 PROCEDURE — 6370000000 HC RX 637 (ALT 250 FOR IP): Performed by: INTERNAL MEDICINE

## 2019-12-31 PROCEDURE — 87186 SC STD MICRODIL/AGAR DIL: CPT

## 2019-12-31 PROCEDURE — 93306 TTE W/DOPPLER COMPLETE: CPT

## 2019-12-31 PROCEDURE — 94760 N-INVAS EAR/PLS OXIMETRY 1: CPT

## 2019-12-31 PROCEDURE — 83550 IRON BINDING TEST: CPT

## 2019-12-31 PROCEDURE — 6360000002 HC RX W HCPCS: Performed by: INTERNAL MEDICINE

## 2019-12-31 PROCEDURE — 94640 AIRWAY INHALATION TREATMENT: CPT

## 2019-12-31 PROCEDURE — 87086 URINE CULTURE/COLONY COUNT: CPT

## 2019-12-31 PROCEDURE — 2580000003 HC RX 258: Performed by: INTERNAL MEDICINE

## 2019-12-31 PROCEDURE — 2060000000 HC ICU INTERMEDIATE R&B

## 2019-12-31 PROCEDURE — 2700000000 HC OXYGEN THERAPY PER DAY

## 2019-12-31 PROCEDURE — 87088 URINE BACTERIA CULTURE: CPT

## 2019-12-31 PROCEDURE — 94761 N-INVAS EAR/PLS OXIMETRY MLT: CPT

## 2019-12-31 PROCEDURE — 83540 ASSAY OF IRON: CPT

## 2019-12-31 RX ORDER — ONDANSETRON 4 MG/1
4 TABLET, ORALLY DISINTEGRATING ORAL EVERY 6 HOURS PRN
Status: DISCONTINUED | OUTPATIENT
Start: 2019-12-31 | End: 2020-01-02 | Stop reason: HOSPADM

## 2019-12-31 RX ORDER — ALBUTEROL SULFATE 2.5 MG/3ML
2.5 SOLUTION RESPIRATORY (INHALATION) EVERY 6 HOURS PRN
Status: DISCONTINUED | OUTPATIENT
Start: 2019-12-31 | End: 2020-01-02 | Stop reason: HOSPADM

## 2019-12-31 RX ORDER — ONDANSETRON 2 MG/ML
4 INJECTION INTRAMUSCULAR; INTRAVENOUS EVERY 6 HOURS PRN
Status: DISCONTINUED | OUTPATIENT
Start: 2019-12-31 | End: 2020-01-02 | Stop reason: HOSPADM

## 2019-12-31 RX ORDER — ACETAMINOPHEN AND CODEINE PHOSPHATE 300; 30 MG/1; MG/1
1 TABLET ORAL EVERY 6 HOURS PRN
Status: DISCONTINUED | OUTPATIENT
Start: 2019-12-31 | End: 2020-01-02 | Stop reason: HOSPADM

## 2019-12-31 RX ORDER — ISOSORBIDE MONONITRATE 30 MG/1
30 TABLET, EXTENDED RELEASE ORAL DAILY
Status: DISCONTINUED | OUTPATIENT
Start: 2019-12-31 | End: 2020-01-02 | Stop reason: HOSPADM

## 2019-12-31 RX ADMIN — CARBIDOPA AND LEVODOPA 1 TABLET: 25; 100 TABLET, EXTENDED RELEASE ORAL at 20:55

## 2019-12-31 RX ADMIN — TRAZODONE HYDROCHLORIDE 50 MG: 50 TABLET ORAL at 20:55

## 2019-12-31 RX ADMIN — AMIODARONE HYDROCHLORIDE 200 MG: 200 TABLET ORAL at 08:40

## 2019-12-31 RX ADMIN — ACETAMINOPHEN 650 MG: 325 TABLET ORAL at 00:28

## 2019-12-31 RX ADMIN — ACETAMINOPHEN AND CODEINE PHOSPHATE 1 TABLET: 300; 30 TABLET ORAL at 22:44

## 2019-12-31 RX ADMIN — CALCIUM ACETATE 667 MG: 667 CAPSULE ORAL at 08:40

## 2019-12-31 RX ADMIN — CARBIDOPA AND LEVODOPA 1 TABLET: 25; 100 TABLET, EXTENDED RELEASE ORAL at 08:40

## 2019-12-31 RX ADMIN — SODIUM CHLORIDE, PRESERVATIVE FREE 10 ML: 5 INJECTION INTRAVENOUS at 21:13

## 2019-12-31 RX ADMIN — APIXABAN 2.5 MG: 2.5 TABLET, FILM COATED ORAL at 08:40

## 2019-12-31 RX ADMIN — CARBIDOPA AND LEVODOPA 1 TABLET: 25; 100 TABLET, EXTENDED RELEASE ORAL at 17:27

## 2019-12-31 RX ADMIN — ALBUTEROL SULFATE 2.5 MG: 2.5 SOLUTION RESPIRATORY (INHALATION) at 07:37

## 2019-12-31 RX ADMIN — ROPINIROLE HYDROCHLORIDE 2 MG: 2 TABLET, FILM COATED ORAL at 08:40

## 2019-12-31 RX ADMIN — RASAGILINE 1 MG: 0.5 TABLET ORAL at 08:40

## 2019-12-31 RX ADMIN — ALBUTEROL SULFATE 2.5 MG: 2.5 SOLUTION RESPIRATORY (INHALATION) at 15:17

## 2019-12-31 RX ADMIN — CALCIUM ACETATE 667 MG: 667 CAPSULE ORAL at 17:28

## 2019-12-31 RX ADMIN — SENNOSIDES 17.2 MG: 8.6 TABLET, FILM COATED ORAL at 20:55

## 2019-12-31 RX ADMIN — ATORVASTATIN CALCIUM 10 MG: 10 TABLET, FILM COATED ORAL at 08:40

## 2019-12-31 RX ADMIN — SODIUM CHLORIDE, PRESERVATIVE FREE 10 ML: 5 INJECTION INTRAVENOUS at 08:44

## 2019-12-31 RX ADMIN — METOPROLOL TARTRATE 25 MG: 25 TABLET ORAL at 20:55

## 2019-12-31 RX ADMIN — CARBIDOPA AND LEVODOPA 1 TABLET: 25; 100 TABLET, EXTENDED RELEASE ORAL at 11:22

## 2019-12-31 RX ADMIN — ISOSORBIDE MONONITRATE 30 MG: 30 TABLET ORAL at 17:27

## 2019-12-31 RX ADMIN — PANTOPRAZOLE SODIUM 40 MG: 40 TABLET, DELAYED RELEASE ORAL at 08:40

## 2019-12-31 RX ADMIN — METOPROLOL TARTRATE 25 MG: 25 TABLET ORAL at 11:22

## 2019-12-31 RX ADMIN — ROPINIROLE HYDROCHLORIDE 2 MG: 2 TABLET, FILM COATED ORAL at 03:28

## 2019-12-31 RX ADMIN — CALCITRIOL 0.25 MCG: 0.25 CAPSULE ORAL at 08:39

## 2019-12-31 RX ADMIN — ACETAMINOPHEN AND CODEINE PHOSPHATE 1 TABLET: 300; 30 TABLET ORAL at 11:22

## 2019-12-31 RX ADMIN — APIXABAN 2.5 MG: 2.5 TABLET, FILM COATED ORAL at 20:55

## 2019-12-31 RX ADMIN — ONDANSETRON 4 MG: 4 TABLET, ORALLY DISINTEGRATING ORAL at 20:45

## 2019-12-31 RX ADMIN — ASPIRIN 81 MG: 81 TABLET, COATED ORAL at 08:40

## 2019-12-31 RX ADMIN — FERROUS SULFATE TAB EC 325 MG (65 MG FE EQUIVALENT) 325 MG: 325 (65 FE) TABLET DELAYED RESPONSE at 17:27

## 2019-12-31 RX ADMIN — FERROUS SULFATE TAB EC 325 MG (65 MG FE EQUIVALENT) 325 MG: 325 (65 FE) TABLET DELAYED RESPONSE at 08:40

## 2019-12-31 RX ADMIN — CALCIUM ACETATE 667 MG: 667 CAPSULE ORAL at 11:22

## 2019-12-31 RX ADMIN — ALBUTEROL SULFATE 2.5 MG: 2.5 SOLUTION RESPIRATORY (INHALATION) at 11:35

## 2019-12-31 RX ADMIN — LINAGLIPTIN 2.5 MG: 5 TABLET, FILM COATED ORAL at 08:40

## 2019-12-31 RX ADMIN — FUROSEMIDE 20 MG: 20 TABLET ORAL at 08:40

## 2019-12-31 RX ADMIN — ROPINIROLE HYDROCHLORIDE 2 MG: 2 TABLET, FILM COATED ORAL at 20:55

## 2019-12-31 RX ADMIN — ALBUTEROL SULFATE 2.5 MG: 2.5 SOLUTION RESPIRATORY (INHALATION) at 01:28

## 2019-12-31 RX ADMIN — ALBUTEROL SULFATE 2.5 MG: 2.5 SOLUTION RESPIRATORY (INHALATION) at 20:17

## 2019-12-31 RX ADMIN — ROPINIROLE HYDROCHLORIDE 2 MG: 2 TABLET, FILM COATED ORAL at 14:15

## 2019-12-31 ASSESSMENT — PAIN DESCRIPTION - FREQUENCY: FREQUENCY: CONTINUOUS

## 2019-12-31 ASSESSMENT — PAIN DESCRIPTION - ONSET: ONSET: GRADUAL

## 2019-12-31 ASSESSMENT — PAIN SCALES - GENERAL
PAINLEVEL_OUTOF10: 8
PAINLEVEL_OUTOF10: 0
PAINLEVEL_OUTOF10: 10
PAINLEVEL_OUTOF10: 10

## 2019-12-31 ASSESSMENT — PAIN DESCRIPTION - ORIENTATION: ORIENTATION: RIGHT

## 2019-12-31 ASSESSMENT — PAIN DESCRIPTION - DESCRIPTORS: DESCRIPTORS: SQUEEZING

## 2019-12-31 ASSESSMENT — PAIN DESCRIPTION - LOCATION: LOCATION: LEG

## 2019-12-31 ASSESSMENT — PAIN DESCRIPTION - PAIN TYPE: TYPE: ACUTE PAIN

## 2019-12-31 ASSESSMENT — PAIN - FUNCTIONAL ASSESSMENT: PAIN_FUNCTIONAL_ASSESSMENT: ACTIVITIES ARE NOT PREVENTED

## 2019-12-31 ASSESSMENT — PAIN DESCRIPTION - PROGRESSION: CLINICAL_PROGRESSION: NOT CHANGED

## 2019-12-31 NOTE — CARE COORDINATION
Case Management Initial Discharge Plan  Donell Coleman,         Readmission Risk              Risk of Unplanned Readmission:        39             Met with:patient to discuss discharge plans. Information verified: address, contacts, phone number, , insurance Yes  PCP: Ingrid Narvaez MD  Date of last visit: Dec 2019    Insurance Provider: [facility] [place]/ Medicare    Discharge Planning  Current Residence:  Private home  Living Arrangements:  Spouse/Significant Other, Family Members   Home has 1 stories/3 stairs to climb  Support Systems:  Family Members, Friends/Neighbors, Children  Current Services PTA:  [address]: private pay arrangements  Patient able to perform ADL's:Assisted  DME in home:  O2 concentrator and portability 24/7 2L from [address], CPAP, nebulizer, cane, walker, bath bench, glucometer, elevated toilet seat  DME used to aid ambulation prior to admission:   walker  DME used during admission:  walker    Potential Assistance Needed:  [address]: [employer] on [address]   Potential Assistance Purchasing Medications:  No  Does patient want to participate in local refill/ meds to beds program?  No    Patient agreeable to home care: No  [place] of choice provided:  n/a      Type of Home Care Services:  [facility], [address]  Patient expects to be discharged to:  Home     Prior SNF/Rehab Placement and Facility: [facility] and [address] [place] [address] to SNF/Rehab: No  [place] of choice provided: n/a   Evaluation: n/a    Expected Discharge date:  20  Follow Up Appointment: Best Day/ Time: Monday AM    Transportation provider:   Transportation arrangements needed for discharge: No    Discharge Plan:   Met with patient to review plan of care. Pt was d/c from here Dec 10 2019 with [facility] for IV antibiotics for UTI with ESBL. Pt is done with home care and IV antibiotics.     She states she has a private pay aide 3 times a week and she

## 2019-12-31 NOTE — CARE COORDINATION
[address]  patient (UTI- 690)  (anchor stay - 12/10/19- UTI- )     2019    Patient: Elle Samuels Patient : 1945   MRN: [id number]  Reason for Admission: CP   RARS: Readmission Risk Score: 39         Spoke with: Jefferson Oswald and KYA Pate    Met with KYA Pate who states pt likely going home without home care this admission (had it for IV abx last discharge)        BPCI-A 90-day Readmission Interview:    [place] DR[facility]: .      Bundle End Date: 3/9/20    Patient/family seen: Yes    Current discharge plan: home independently (goes to OP PT and this will continue)    Collaboration completed with case management: Yes        Readmission Risk  Patient Active Problem List   Diagnosis    Controlled type 2 diabetes mellitus with stage 3 chronic kidney disease, without long-term current use of insulin (HCC)    Hyperlipidemia    Essential hypertension    Chronic leg pain    Paroxysmal atrial fibrillation (HCC)    Asthma    Gastroesophageal reflux disease without esophagitis    ECTOR on CPAP    Type 2 diabetes mellitus with complication, without long-term current use of insulin (HCC)    Spinal stenosis in cervical region    Hypomagnesemia    Hyperphosphaturia    Hypocalcemia    Parkinson's disease ([address]    Acute renal failure ([address]    Acute cystitis with hematuria    Altered mental status    Iron deficiency anemia due to chronic blood loss    Morbid obesity with BMI of 40.0-44.9, adult (HCC)    Hyperplastic polyp of intestine    Diverticulosis    Panlobular emphysema (HCC)    Rapid atrial fibrillation (HCC)    CKD (chronic kidney disease) stage 3, GFR 30-59 ml/min (HCC)    KRISTIN (acute kidney injury) ([address]    Anemia in chronic kidney disease    Chronic respiratory failure with hypoxia and hypercapnia (HCC)    Acute kidney injury superimposed on chronic kidney disease ([address]    CKD stage 4 due to type 2 diabetes mellitus ([address]    E. coli UTI (urinary tract infection)    Nephrolithiasis    Candidal intertrigo    Venous insufficiency    Malabsorption    Anemia of chronic renal failure, stage 4 (severe) (HCC)    Iron deficiency    Coronary atherosclerosis    COPD exacerbation (HCC)    Restless leg syndrome    SIRS (systemic inflammatory response syndrome) (HCC)    Nausea and vomiting in adult    Bacterial UTI    Odynophagia    Esophageal dysphagia    Candida esophagitis (HCC)    Sepsis due to urinary tract infection (HCC)    Chronic respiratory failure with hypoxia (HCC)    Chronic diastolic congestive heart failure (HCC)    History of ESBL E. coli infection    Stage 4 chronic kidney disease (HCC)    Fever    Macrocytic anemia    Hypercalcemia    Monoclonal gammopathy of undetermined significance    Acute nonintractable headache    Anemia of chronic renal failure, stage 4 (severe) (HCC)    Traumatic closed displaced fracture of proximal end of right humerus, initial encounter    Urinary tract infection without hematuria    Metabolic encephalopathy    Chest pain    Acute kidney injury ([address]    Right leg swelling    Hematuria       Inpatient Assessment  Care Transitions Summary    Care Transitions Inpatient Review  Medication Review  Do you have all of your prescriptions and are they filled?:  No   Housing Review  Who do you live with?:  Partner/Spouse/SO  Are you an active caregiver in your home?:  No  Social Support  Do you have a [address] [facility]?:  Yes  [facility] Name:  had [id number] [facility] Contact Info:  [id number] [facility] Equipment  Patient DME:  [place] cane, Straight cane, Shower chair, Other  Other Patient DME:  elevated toilet seat   Patient Home Equipment:  Nebulizer  Functional Review  Hearing and Vision  Care Transitions Interventions                                 Follow Up  Future Appointments   Date Time Provider Yue Qiu   1/9/2020  2:40 PM Anusha Austin MD [facility]

## 2019-12-31 NOTE — CONSULTS
Coverage for Dr. Nighat Morton    [facility]    Patient Name:  Mony Martinez    BPW:[birth year]       Date of Consult: 12/31/2019  Date of Admission: 12/30/2019    Referring Physician: Ellis Maldonado MD  Reason for Consult: chest pain    HPI: Mony Martinez is a pleasant 69 y.o. female who presented to the ER with chest pain. She states at home it felt like she went into afib and she developed chest pain while sitting at her computer. A-fib was not noted on monitor. She also states she did have some palpitations. She was given NTG and ASA with some relief en route. She is a known patient for CHF, paroxysmal afib, HC and HTN. She denies any chest pain currently, dyspnea, dizziness, tachycardia, edema, or abdominal pain. Cardiology was asked to evaluate. Cardiac cath 8/2019:   Procedure Summary    Non-obstructive coronary artery disease. Global left ventricular systolic function is mildly reduced. Mildly elevated LVEDP. Echo 8/2019:   Summary  Left ventricle is normal in size. Mild to moderate left ventricular  hypertrophy. Global left ventricular systolic function is mildly reduced with an  estimated ejection fraction of 45 %. Inferoseptal hypokinesis. Left atrium is mildly dilated. Aortic leaflet calcification with mild stenosis. Peak instantaneous gradient 27 mmHg and mean gradient 11 mmHg. Moderate to severe aortic insufficiency. Mitral valve sclerosis without stenosis with calcified mitral valve chordae. Mild mitral regurgitation. No significant pericardial effusion is seen.     PMH:   Past Medical History:   Diagnosis Date    Acute on chronic diastolic congestive heart failure ([address]     Anesthesia complication     DIFFICULTY WAKING OP    Asthma     Atrial fibrillation ([address] 2013    Cataracts, bilateral     Cellulitis     BILAT LOWER LEGS-PT STATES IS IMPROVING    Cerebral artery occlusion with cerebral infarction [facility])     Last stroke was February 2017 w/no deficits-TOTAL OF 3    2019    HARDWARE REMOVAL PINS  HUMERUS     HARDWARE REMOVAL Right 2019    HARDWARE REMOVAL PINS  HUMERUS  C-ARM performed by Jenifer Dyer MD at [address] [facility]  2013    Dr. Christa Campbell DX W/CELL WASHG [facility] N/A 2018    BRONCHOSCOPY performed by Clifford Foy MD at [facility] FLX W/REMOVAL LESION BY HOT BX FORCEPS N/A 2017    COLONOSCOPY POLYPECTOMY HOT BIOPSY performed by Jesse Alvarez MD at [facility] N/A 2018    CYSTOSCOPY performed by Kathleen Meyer MD at [address] Right 2019    SHOULDER CLOSED REDUCTION PERCUTANEOUS PINNING RIGHT performed by Jenifer Dyer MD at [place] [id number] ENDOSCOPY  2016    gastritis, esophagitis,     UPPER GASTROINTESTINAL ENDOSCOPY N/A 2018    EGD BIOPSY performed by Jesse Alvarez MD at [facility] HX:   Family History   Problem Relation Age of Onset    Heart Failure Mother     Hypertension Mother     Heart Disease Mother     High Blood Pressure Mother       Social HX:   Social History     Socioeconomic History    Marital status:      Spouse name: Not on file    Number of children: Not on file    Years of education: Not on file    Highest education level: Not on file   Occupational History    Not on file   Social Needs    Financial resource strain: Not on file    Food insecurity:     Worry: Not on file     Inability: Not on file    Transportation needs:     Medical: Not on file     Non-medical: Not on file   Tobacco Use    Smoking status: Former Smoker     Packs/day: 2.00     Years: 15.00     Pack years: 30.00     Types: Cigarettes     Last attempt to quit: 10/31/1970     Years since quittin.2    Smokeless tobacco: Never Used   Substance and Sexual Activity    Alcohol use:  Yes     Alcohol/week: 2.0 standard drinks     Types: 2 Shots of liquor per week     Comment: 4 DRINKS A YEAR    Drug use: No     Comment: Pt denies use.  Sexual activity: Not on file     Comment: not for the past year d/t illness, denies  concerns/pain   Lifestyle    Physical activity:     Days per week: Not on file     Minutes per session: Not on file    Stress: Not on file   Relationships    Social connections:     Talks on phone: Not on file     Gets together: Not on file     Attends Mandaen service: Not on file     Active member of club or organization: Not on file     Attends meetings of clubs or organizations: Not on file     Relationship status: Not on file    Intimate partner violence:     Fear of current or ex partner: Not on file     Emotionally abused: Not on file     Physically abused: Not on file     Forced sexual activity: Not on file   Other Topics Concern    Not on file   Social History Narrative    Not on file        Allergies: Allergies   Allergen Reactions    Dye [Barium-Containing Compounds] Other (See Comments)     Cause Afib per     Pcn [Penicillins] Itching and Swelling    Bactrim [Sulfamethoxazole-Trimethoprim] Other (See Comments)     Allergic Nephritis        Home Meds:   Prior to Admission medications    Medication Sig Start Date End Date Taking?  Authorizing Provider   apixaban (ELIQUIS) 2.5 MG TABS tablet Take 1 tablet by mouth 2 times daily 12/10/19   Starla Guallpa MD   darbepoetin albaro-polysorbate (ARANESP) 200 MCG/0.4ML SOSY injection Inject 200 mcg into the skin every 28 days    Historical Provider, MD   fluticasone-vilanterol (BREO ELLIPTA) 100-25 MCG/INH AEPB inhaler Inhale 2 puffs into the lungs daily    Historical Provider, MD   traZODone (DESYREL) 50 MG tablet Take 50 mg by mouth nightly    Historical Provider, MD   albuterol (PROVENTIL) (2.5 MG/3ML) 0.083% nebulizer solution Take 3 mLs by nebulization 4 times daily 9/11/19   Starla Guallpa MD   metoprolol tartrate (LOPRESSOR) 25 MG MG EC tablet Take 1 tablet by mouth 2 times daily (with meals) 12/21/17   Bruceveronica Nikolaijace HO Blood, DO   rasagiline mesylate 1 MG TABS Take 1 tablet by mouth daily    Historical Provider, MD   Oxygen Concentrator Dx: Pneumonia, use as directed. Patient taking differently: Dx: Pneumonia, use as directed. ON 24/7 2/10/17   Sarah Toussaint DO       Hospital Meds:   Scheduled Meds:   sodium chloride flush  10 mL Intravenous 2 times per day    albuterol  2.5 mg Nebulization 4x Daily    amiodarone  200 mg Oral Daily    apixaban  2.5 mg Oral BID    aspirin  81 mg Oral Daily    atorvastatin  10 mg Oral Daily    calcitRIOL  0.25 mcg Oral Daily    calcium acetate  667 mg Oral TID WC    carbidopa-levodopa  1 tablet Oral 4x Daily    ferrous sulfate  325 mg Oral BID WC    mometasone-formoterol  2 puff Inhalation BID    furosemide  20 mg Oral Daily    linagliptin  2.5 mg Oral Daily    metoprolol tartrate  25 mg Oral BID    pantoprazole  40 mg Oral QAM AC    rasagiline  1 mg Oral Daily    rOPINIRole  2 mg Oral TID    senna  2 tablet Oral Nightly    traZODone  50 mg Oral Nightly    insulin lispro  0-6 Units Subcutaneous TID WC    insulin lispro  0-3 Units Subcutaneous Nightly      Continuous Infusions:   dextrose       PRN Meds: albuterol, ondansetron **OR** ondansetron, acetaminophen-codeine, acetaminophen, sodium chloride flush, magnesium hydroxide, melatonin, glucose, dextrose, glucagon (rDNA), dextrose    Review of Systems:  AS PER HPI    Physical Exam:     Vital Signs: BP (!) 120/36   Pulse 84   Temp 98.6 °F (37 °C) (Oral)   Resp 16   Ht 5' 1\" (1.549 m)   Wt 181 lb 8 oz (82.3 kg)   LMP 04/03/2004 (Within Years)   SpO2 94%   BMI 34.29 kg/m²  O2 Flow Rate (L/min): 2 L/min    Wt. Weight change:   I/O:  No intake/output data recorded. No intake/output data recorded. Monitor: SR    General Appearance: Drowsy but arousable, NAD. Skin: Pink, warm and dry.   Pulmonary/Chest: Diminished at bases

## 2020-01-01 ENCOUNTER — HOSPITAL ENCOUNTER (OUTPATIENT)
Facility: MEDICAL CENTER | Age: 75
Discharge: HOME | End: 2020-11-05
Payer: MEDICARE

## 2020-01-01 ENCOUNTER — HOSPITAL ENCOUNTER (OUTPATIENT)
Age: 75
Setting detail: SPECIMEN
Discharge: HOME | End: 2020-12-16
Payer: MEDICARE

## 2020-01-01 ENCOUNTER — HOSPITAL ENCOUNTER (OUTPATIENT)
Facility: MEDICAL CENTER | Age: 75
Discharge: HOME | End: 2020-12-23
Payer: MEDICARE

## 2020-01-01 ENCOUNTER — HOSPITAL ENCOUNTER (OUTPATIENT)
Dept: INFUSION THERAPY | Facility: MEDICAL CENTER | Age: 75
Discharge: HOME | End: 2020-09-24
Payer: MEDICARE

## 2020-01-01 ENCOUNTER — HOSPITAL ENCOUNTER (OUTPATIENT)
Dept: GENERAL RADIOLOGY | Age: 75
Discharge: HOME | End: 2020-08-21
Payer: MEDICARE

## 2020-01-01 ENCOUNTER — HOSPITAL ENCOUNTER (OUTPATIENT)
Facility: MEDICAL CENTER | Age: 75
Discharge: HOME | End: 2020-12-03
Payer: MEDICARE

## 2020-01-01 ENCOUNTER — TELEPHONE (OUTPATIENT)
Dept: FAMILY MEDICINE CLINIC | Age: 75
End: 2020-01-01

## 2020-01-01 ENCOUNTER — ANESTHESIA EVENT (OUTPATIENT)
Dept: INPATIENT UNIT | Age: 75
DRG: 987 | End: 2020-01-01
Payer: MEDICARE

## 2020-01-01 ENCOUNTER — HOSPITAL ENCOUNTER (OUTPATIENT)
Dept: INFUSION THERAPY | Facility: MEDICAL CENTER | Age: 75
Discharge: HOME | End: 2020-07-15
Payer: MEDICARE

## 2020-01-01 ENCOUNTER — CARE COORDINATION (OUTPATIENT)
Dept: CASE MANAGEMENT | Age: 75
End: 2020-01-01

## 2020-01-01 ENCOUNTER — HOSPITAL ENCOUNTER (OUTPATIENT)
Age: 75
Discharge: HOME | End: 2020-11-02
Payer: MEDICARE

## 2020-01-01 ENCOUNTER — APPOINTMENT (OUTPATIENT)
Dept: GENERAL RADIOLOGY | Age: 75
DRG: 987 | End: 2020-01-01
Payer: MEDICARE

## 2020-01-01 ENCOUNTER — APPOINTMENT (OUTPATIENT)
Dept: GENERAL RADIOLOGY | Age: 75
End: 2020-01-01
Payer: MEDICARE

## 2020-01-01 ENCOUNTER — HOSPITAL ENCOUNTER (OUTPATIENT)
Dept: PREADMISSION TESTING | Age: 75
Setting detail: SPECIMEN
Discharge: HOME | End: 2020-08-19
Payer: MEDICARE

## 2020-01-01 ENCOUNTER — HOSPITAL ENCOUNTER (OUTPATIENT)
Dept: INFUSION THERAPY | Facility: MEDICAL CENTER | Age: 75
Discharge: HOME | End: 2020-07-23
Payer: MEDICARE

## 2020-01-01 ENCOUNTER — HOSPITAL ENCOUNTER (OUTPATIENT)
Facility: MEDICAL CENTER | Age: 75
Discharge: HOME | End: 2020-06-10
Payer: MEDICARE

## 2020-01-01 ENCOUNTER — HOSPITAL ENCOUNTER (OUTPATIENT)
Age: 75
Setting detail: SPECIMEN
Discharge: HOME | End: 2020-05-12
Payer: MEDICARE

## 2020-01-01 ENCOUNTER — HOSPITAL ENCOUNTER (OUTPATIENT)
Facility: MEDICAL CENTER | Age: 75
Discharge: HOME | End: 2020-08-13
Payer: MEDICARE

## 2020-01-01 ENCOUNTER — HOSPITAL ENCOUNTER (OUTPATIENT)
Dept: INFUSION THERAPY | Facility: MEDICAL CENTER | Age: 75
Discharge: HOME | End: 2020-12-03
Payer: MEDICARE

## 2020-01-01 ENCOUNTER — HOSPITAL ENCOUNTER (OUTPATIENT)
Dept: INFUSION THERAPY | Facility: MEDICAL CENTER | Age: 75
Discharge: HOME | End: 2020-09-03
Payer: MEDICARE

## 2020-01-01 ENCOUNTER — OFFICE VISIT (OUTPATIENT)
Dept: ONCOLOGY | Age: 75
End: 2020-01-01
Payer: MEDICARE

## 2020-01-01 ENCOUNTER — ANESTHESIA (OUTPATIENT)
Dept: OPERATING ROOM | Age: 75
DRG: 987 | End: 2020-01-01
Payer: MEDICARE

## 2020-01-01 ENCOUNTER — APPOINTMENT (OUTPATIENT)
Dept: CT IMAGING | Age: 75
DRG: 987 | End: 2020-01-01
Payer: MEDICARE

## 2020-01-01 ENCOUNTER — HOSPITAL ENCOUNTER (OUTPATIENT)
Age: 75
Setting detail: SPECIMEN
Discharge: HOME | End: 2020-05-20
Payer: MEDICARE

## 2020-01-01 ENCOUNTER — HOSPITAL ENCOUNTER (EMERGENCY)
Age: 75
Discharge: HOME | End: 2020-09-23
Attending: EMERGENCY MEDICINE
Payer: MEDICARE

## 2020-01-01 ENCOUNTER — HOSPITAL ENCOUNTER (OUTPATIENT)
Dept: INFUSION THERAPY | Facility: MEDICAL CENTER | Age: 75
Discharge: HOME | End: 2020-06-10
Payer: MEDICARE

## 2020-01-01 ENCOUNTER — HOSPITAL ENCOUNTER (INPATIENT)
Age: 75
LOS: 9 days | Discharge: HOME HEALTH | DRG: 987 | End: 2020-10-31
Attending: EMERGENCY MEDICINE | Admitting: INTERNAL MEDICINE
Payer: MEDICARE

## 2020-01-01 ENCOUNTER — HOSPITAL ENCOUNTER (OUTPATIENT)
Facility: MEDICAL CENTER | Age: 75
Discharge: HOME | End: 2020-05-20
Payer: MEDICARE

## 2020-01-01 ENCOUNTER — HOSPITAL ENCOUNTER (OUTPATIENT)
Dept: INFUSION THERAPY | Facility: MEDICAL CENTER | Age: 75
Discharge: HOME | End: 2020-11-05
Payer: MEDICARE

## 2020-01-01 ENCOUNTER — HOSPITAL ENCOUNTER (OUTPATIENT)
Age: 75
Setting detail: SPECIMEN
Discharge: HOME | End: 2020-11-06
Payer: MEDICARE

## 2020-01-01 ENCOUNTER — HOSPITAL ENCOUNTER (OUTPATIENT)
Dept: INFUSION THERAPY | Facility: MEDICAL CENTER | Age: 75
Discharge: HOME | End: 2020-08-13
Payer: MEDICARE

## 2020-01-01 ENCOUNTER — HOSPITAL ENCOUNTER (OUTPATIENT)
Dept: INFUSION THERAPY | Facility: MEDICAL CENTER | Age: 75
Discharge: HOME | End: 2020-07-01
Payer: MEDICARE

## 2020-01-01 ENCOUNTER — ANESTHESIA (OUTPATIENT)
Dept: INPATIENT UNIT | Age: 75
DRG: 987 | End: 2020-01-01
Payer: MEDICARE

## 2020-01-01 ENCOUNTER — HOSPITAL ENCOUNTER (OUTPATIENT)
Facility: MEDICAL CENTER | Age: 75
Discharge: HOME | End: 2020-09-24
Payer: MEDICARE

## 2020-01-01 ENCOUNTER — HOSPITAL ENCOUNTER (OUTPATIENT)
Dept: SLEEP CENTER | Age: 75
Discharge: HOME | End: 2020-11-05
Payer: MEDICARE

## 2020-01-01 ENCOUNTER — HOSPITAL ENCOUNTER (OUTPATIENT)
Dept: INFUSION THERAPY | Facility: MEDICAL CENTER | Age: 75
Discharge: HOME | End: 2020-12-23
Payer: MEDICARE

## 2020-01-01 ENCOUNTER — HOSPITAL ENCOUNTER (OUTPATIENT)
Facility: MEDICAL CENTER | Age: 75
Discharge: HOME | End: 2020-07-01
Payer: MEDICARE

## 2020-01-01 ENCOUNTER — HOSPITAL ENCOUNTER (OUTPATIENT)
Age: 75
Setting detail: SPECIMEN
Discharge: HOME | End: 2020-05-02
Payer: MEDICARE

## 2020-01-01 ENCOUNTER — HOSPITAL ENCOUNTER (OUTPATIENT)
Age: 75
Setting detail: SPECIMEN
Discharge: HOME | End: 2020-05-30
Payer: MEDICARE

## 2020-01-01 ENCOUNTER — HOSPITAL ENCOUNTER (OUTPATIENT)
Dept: INFUSION THERAPY | Facility: MEDICAL CENTER | Age: 75
Discharge: HOME | End: 2020-05-20
Payer: MEDICARE

## 2020-01-01 ENCOUNTER — HOSPITAL ENCOUNTER (OUTPATIENT)
Facility: MEDICAL CENTER | Age: 75
Discharge: HOME | End: 2020-10-15
Payer: MEDICARE

## 2020-01-01 ENCOUNTER — ANESTHESIA EVENT (OUTPATIENT)
Dept: OPERATING ROOM | Age: 75
DRG: 987 | End: 2020-01-01
Payer: MEDICARE

## 2020-01-01 ENCOUNTER — HOSPITAL ENCOUNTER (OUTPATIENT)
Facility: MEDICAL CENTER | Age: 75
End: 2020-01-01
Payer: MEDICARE

## 2020-01-01 ENCOUNTER — TELEPHONE (OUTPATIENT)
Dept: ONCOLOGY | Age: 75
End: 2020-01-01

## 2020-01-01 ENCOUNTER — OFFICE VISIT (OUTPATIENT)
Dept: ORTHOPEDIC SURGERY | Age: 75
End: 2020-01-01
Payer: MEDICARE

## 2020-01-01 ENCOUNTER — HOSPITAL ENCOUNTER (OUTPATIENT)
Facility: MEDICAL CENTER | Age: 75
Discharge: HOME | End: 2020-07-23
Payer: MEDICARE

## 2020-01-01 ENCOUNTER — HOSPITAL ENCOUNTER (OUTPATIENT)
Facility: MEDICAL CENTER | Age: 75
Discharge: HOME | End: 2020-09-03
Payer: MEDICARE

## 2020-01-01 ENCOUNTER — HOSPITAL ENCOUNTER (OUTPATIENT)
Dept: PREADMISSION TESTING | Age: 75
Discharge: HOME | End: 2020-11-03
Payer: MEDICARE

## 2020-01-01 ENCOUNTER — HOSPITAL ENCOUNTER (OUTPATIENT)
Dept: INFUSION THERAPY | Facility: MEDICAL CENTER | Age: 75
Discharge: HOME | End: 2020-10-15
Payer: MEDICARE

## 2020-01-01 VITALS
WEIGHT: 180.1 LBS | DIASTOLIC BLOOD PRESSURE: 56 MMHG | BODY MASS INDEX: 38.85 KG/M2 | TEMPERATURE: 98.4 F | SYSTOLIC BLOOD PRESSURE: 121 MMHG | HEART RATE: 90 BPM | HEIGHT: 57 IN | RESPIRATION RATE: 18 BRPM | OXYGEN SATURATION: 98 %

## 2020-01-01 VITALS
RESPIRATION RATE: 16 BRPM | HEIGHT: 57 IN | OXYGEN SATURATION: 100 % | BODY MASS INDEX: 36.68 KG/M2 | SYSTOLIC BLOOD PRESSURE: 110 MMHG | DIASTOLIC BLOOD PRESSURE: 60 MMHG | TEMPERATURE: 98 F | HEART RATE: 54 BPM | WEIGHT: 170 LBS

## 2020-01-01 VITALS — TEMPERATURE: 97 F | BODY MASS INDEX: 36.69 KG/M2 | HEIGHT: 59 IN | WEIGHT: 182 LBS

## 2020-01-01 VITALS — TEMPERATURE: 97.8 F | SYSTOLIC BLOOD PRESSURE: 152 MMHG | DIASTOLIC BLOOD PRESSURE: 66 MMHG | HEART RATE: 56 BPM

## 2020-01-01 VITALS
TEMPERATURE: 98.2 F | RESPIRATION RATE: 18 BRPM | DIASTOLIC BLOOD PRESSURE: 48 MMHG | HEART RATE: 62 BPM | SYSTOLIC BLOOD PRESSURE: 109 MMHG

## 2020-01-01 VITALS
HEART RATE: 76 BPM | TEMPERATURE: 98.3 F | DIASTOLIC BLOOD PRESSURE: 45 MMHG | SYSTOLIC BLOOD PRESSURE: 103 MMHG | RESPIRATION RATE: 18 BRPM

## 2020-01-01 VITALS
DIASTOLIC BLOOD PRESSURE: 47 MMHG | HEART RATE: 59 BPM | RESPIRATION RATE: 16 BRPM | SYSTOLIC BLOOD PRESSURE: 110 MMHG | TEMPERATURE: 98.4 F

## 2020-01-01 VITALS — WEIGHT: 179 LBS | BODY MASS INDEX: 36.08 KG/M2 | HEIGHT: 59 IN

## 2020-01-01 VITALS
TEMPERATURE: 97.5 F | SYSTOLIC BLOOD PRESSURE: 100 MMHG | RESPIRATION RATE: 20 BRPM | HEART RATE: 52 BPM | DIASTOLIC BLOOD PRESSURE: 42 MMHG

## 2020-01-01 VITALS
RESPIRATION RATE: 20 BRPM | SYSTOLIC BLOOD PRESSURE: 115 MMHG | HEART RATE: 63 BPM | DIASTOLIC BLOOD PRESSURE: 49 MMHG | TEMPERATURE: 97.6 F

## 2020-01-01 VITALS — TEMPERATURE: 97.3 F | OXYGEN SATURATION: 100 % | DIASTOLIC BLOOD PRESSURE: 74 MMHG | SYSTOLIC BLOOD PRESSURE: 142 MMHG

## 2020-01-01 VITALS
DIASTOLIC BLOOD PRESSURE: 47 MMHG | HEART RATE: 61 BPM | TEMPERATURE: 98.3 F | RESPIRATION RATE: 20 BRPM | SYSTOLIC BLOOD PRESSURE: 103 MMHG

## 2020-01-01 VITALS
DIASTOLIC BLOOD PRESSURE: 42 MMHG | RESPIRATION RATE: 20 BRPM | HEART RATE: 56 BPM | TEMPERATURE: 98.3 F | SYSTOLIC BLOOD PRESSURE: 92 MMHG

## 2020-01-01 VITALS
RESPIRATION RATE: 18 BRPM | DIASTOLIC BLOOD PRESSURE: 71 MMHG | HEART RATE: 71 BPM | SYSTOLIC BLOOD PRESSURE: 135 MMHG | TEMPERATURE: 98.5 F

## 2020-01-01 VITALS
TEMPERATURE: 98 F | RESPIRATION RATE: 18 BRPM | DIASTOLIC BLOOD PRESSURE: 52 MMHG | HEART RATE: 58 BPM | SYSTOLIC BLOOD PRESSURE: 126 MMHG

## 2020-01-01 VITALS
HEART RATE: 58 BPM | RESPIRATION RATE: 18 BRPM | TEMPERATURE: 97.9 F | SYSTOLIC BLOOD PRESSURE: 116 MMHG | DIASTOLIC BLOOD PRESSURE: 48 MMHG

## 2020-01-01 DIAGNOSIS — K90.89 OTHER SPECIFIED INTESTINAL MALABSORPTION: Primary | ICD-10-CM

## 2020-01-01 DIAGNOSIS — E61.1 IRON DEFICIENCY: ICD-10-CM

## 2020-01-01 DIAGNOSIS — D64.9 ANEMIA, UNSPECIFIED TYPE: ICD-10-CM

## 2020-01-01 DIAGNOSIS — D63.1 ANEMIA OF CHRONIC RENAL FAILURE, STAGE 4 (SEVERE) (HCC): ICD-10-CM

## 2020-01-01 DIAGNOSIS — N18.4 ANEMIA OF CHRONIC RENAL FAILURE, STAGE 4 (SEVERE) (HCC): ICD-10-CM

## 2020-01-01 LAB
-: ABNORMAL
-: NORMAL
ABSOLUTE EOS #: 0 K/UL (ref 0–0.4)
ABSOLUTE EOS #: 0 K/UL (ref 0–0.44)
ABSOLUTE EOS #: 0 K/UL (ref 0–0.44)
ABSOLUTE EOS #: 0.05 K/UL (ref 0–0.44)
ABSOLUTE EOS #: 0.07 K/UL (ref 0–0.4)
ABSOLUTE EOS #: 0.07 K/UL (ref 0–0.44)
ABSOLUTE EOS #: 0.11 K/UL (ref 0–0.44)
ABSOLUTE EOS #: 0.11 K/UL (ref 0–0.44)
ABSOLUTE EOS #: 0.13 K/UL (ref 0–0.44)
ABSOLUTE EOS #: 0.13 K/UL (ref 0–0.44)
ABSOLUTE EOS #: 0.15 K/UL (ref 0–0.4)
ABSOLUTE EOS #: 0.16 K/UL (ref 0–0.44)
ABSOLUTE EOS #: 0.17 K/UL (ref 0–0.44)
ABSOLUTE EOS #: 0.18 K/UL (ref 0–0.44)
ABSOLUTE EOS #: 0.19 K/UL (ref 0–0.44)
ABSOLUTE EOS #: 0.27 K/UL (ref 0–0.4)
ABSOLUTE EOS #: 0.57 K/UL (ref 0–0.44)
ABSOLUTE IMMATURE GRANULOCYTE: 0 K/UL (ref 0–0.3)
ABSOLUTE IMMATURE GRANULOCYTE: 0.03 K/UL (ref 0–0.3)
ABSOLUTE IMMATURE GRANULOCYTE: 0.04 K/UL (ref 0–0.3)
ABSOLUTE IMMATURE GRANULOCYTE: 0.06 K/UL (ref 0–0.3)
ABSOLUTE IMMATURE GRANULOCYTE: 0.07 K/UL (ref 0–0.3)
ABSOLUTE IMMATURE GRANULOCYTE: 0.07 K/UL (ref 0–0.3)
ABSOLUTE IMMATURE GRANULOCYTE: 0.13 K/UL (ref 0–0.3)
ABSOLUTE IMMATURE GRANULOCYTE: 0.15 K/UL (ref 0–0.3)
ABSOLUTE IMMATURE GRANULOCYTE: 0.15 K/UL (ref 0–0.3)
ABSOLUTE IMMATURE GRANULOCYTE: 0.31 K/UL (ref 0–0.3)
ABSOLUTE IMMATURE GRANULOCYTE: 0.45 K/UL (ref 0–0.3)
ABSOLUTE IMMATURE GRANULOCYTE: 0.79 K/UL (ref 0–0.3)
ABSOLUTE IMMATURE GRANULOCYTE: 0.81 K/UL (ref 0–0.3)
ABSOLUTE IMMATURE GRANULOCYTE: 0.85 K/UL (ref 0–0.3)
ABSOLUTE IMMATURE GRANULOCYTE: 0.89 K/UL (ref 0–0.3)
ABSOLUTE IMMATURE GRANULOCYTE: 1.15 K/UL (ref 0–0.3)
ABSOLUTE LYMPH #: 0.57 K/UL (ref 1–4.8)
ABSOLUTE LYMPH #: 0.62 K/UL (ref 1.1–3.7)
ABSOLUTE LYMPH #: 0.64 K/UL (ref 1–4.8)
ABSOLUTE LYMPH #: 0.67 K/UL (ref 1.1–3.7)
ABSOLUTE LYMPH #: 0.67 K/UL (ref 1–4.8)
ABSOLUTE LYMPH #: 0.69 K/UL (ref 1–4.8)
ABSOLUTE LYMPH #: 0.74 K/UL (ref 1.1–3.7)
ABSOLUTE LYMPH #: 0.81 K/UL (ref 1–4.8)
ABSOLUTE LYMPH #: 0.89 K/UL (ref 1.1–3.7)
ABSOLUTE LYMPH #: 0.89 K/UL (ref 1–4.8)
ABSOLUTE LYMPH #: 0.98 K/UL (ref 1.1–3.7)
ABSOLUTE LYMPH #: 1.11 K/UL (ref 1.1–3.7)
ABSOLUTE LYMPH #: 1.14 K/UL (ref 1.1–3.7)
ABSOLUTE LYMPH #: 1.25 K/UL (ref 1.1–3.7)
ABSOLUTE LYMPH #: 1.31 K/UL (ref 1–4.8)
ABSOLUTE LYMPH #: 1.34 K/UL (ref 1.1–3.7)
ABSOLUTE LYMPH #: 1.35 K/UL (ref 1.1–3.7)
ABSOLUTE LYMPH #: 1.38 K/UL (ref 1.1–3.7)
ABSOLUTE LYMPH #: 1.42 K/UL (ref 1–4.8)
ABSOLUTE LYMPH #: 1.57 K/UL (ref 1.1–3.7)
ABSOLUTE LYMPH #: 1.58 K/UL (ref 1.1–3.7)
ABSOLUTE MONO #: 0.27 K/UL (ref 0.2–0.8)
ABSOLUTE MONO #: 0.41 K/UL (ref 0.1–1.2)
ABSOLUTE MONO #: 0.45 K/UL (ref 0.1–1.2)
ABSOLUTE MONO #: 0.45 K/UL (ref 0.1–1.2)
ABSOLUTE MONO #: 0.51 K/UL (ref 0.1–1.2)
ABSOLUTE MONO #: 0.52 K/UL (ref 0.1–1.2)
ABSOLUTE MONO #: 0.53 K/UL (ref 0.1–1.2)
ABSOLUTE MONO #: 0.56 K/UL (ref 0.1–1.2)
ABSOLUTE MONO #: 0.57 K/UL (ref 0.2–0.8)
ABSOLUTE MONO #: 0.58 K/UL (ref 0.2–0.8)
ABSOLUTE MONO #: 0.58 K/UL (ref 0.2–0.8)
ABSOLUTE MONO #: 0.6 K/UL (ref 0.1–1.2)
ABSOLUTE MONO #: 0.62 K/UL (ref 0.1–1.2)
ABSOLUTE MONO #: 0.62 K/UL (ref 0.1–1.2)
ABSOLUTE MONO #: 0.69 K/UL (ref 0.1–1.2)
ABSOLUTE MONO #: 0.7 K/UL (ref 0.1–1.2)
ABSOLUTE MONO #: 0.76 K/UL (ref 0.2–0.8)
ABSOLUTE MONO #: 0.86 K/UL (ref 0.1–1.2)
ABSOLUTE MONO #: 0.97 K/UL (ref 0.2–0.8)
ABSOLUTE MONO #: 1.04 K/UL (ref 0.2–0.8)
ABSOLUTE MONO #: 1.14 K/UL (ref 0.2–0.8)
ALBUMIN SERPL-MCNC: 3.8 G/DL (ref 3.5–5.2)
ALBUMIN SERPL-MCNC: 4.3 G/DL (ref 3.5–5.2)
ALBUMIN/GLOBULIN RATIO: ABNORMAL (ref 1–2.5)
ALBUMIN/GLOBULIN RATIO: ABNORMAL (ref 1–2.5)
ALP BLD-CCNC: 57 U/L (ref 35–104)
ALP BLD-CCNC: 70 U/L (ref 35–104)
ALT SERPL-CCNC: <5 U/L (ref 5–33)
ALT SERPL-CCNC: <5 U/L (ref 5–33)
AMORPHOUS: ABNORMAL
AMORPHOUS: NORMAL
ANION GAP SERPL CALCULATED.3IONS-SCNC: 10 MMOL/L (ref 9–17)
ANION GAP SERPL CALCULATED.3IONS-SCNC: 10 MMOL/L (ref 9–17)
ANION GAP SERPL CALCULATED.3IONS-SCNC: 11 MMOL/L (ref 9–17)
ANION GAP SERPL CALCULATED.3IONS-SCNC: 13 MMOL/L (ref 9–17)
ANION GAP SERPL CALCULATED.3IONS-SCNC: 8 MMOL/L (ref 9–17)
ANION GAP SERPL CALCULATED.3IONS-SCNC: 9 MMOL/L (ref 9–17)
AST SERPL-CCNC: 11 U/L
AST SERPL-CCNC: 9 U/L
BACTERIA: ABNORMAL
BACTERIA: NORMAL
BASOPHILS # BLD: 0 %
BASOPHILS # BLD: 0 % (ref 0–2)
BASOPHILS # BLD: 1 %
BASOPHILS # BLD: 1 %
BASOPHILS # BLD: 1 % (ref 0–2)
BASOPHILS ABSOLUTE: 0 K/UL (ref 0–0.2)
BASOPHILS ABSOLUTE: 0.03 K/UL (ref 0–0.2)
BASOPHILS ABSOLUTE: 0.04 K/UL (ref 0–0.2)
BASOPHILS ABSOLUTE: 0.07 K/UL (ref 0–0.2)
BASOPHILS ABSOLUTE: 0.07 K/UL (ref 0–0.2)
BASOPHILS ABSOLUTE: 0.13 K/UL (ref 0–0.2)
BASOPHILS ABSOLUTE: <0.03 K/UL (ref 0–0.2)
BASOPHILS ABSOLUTE: <0.03 K/UL (ref 0–0.2)
BILIRUB SERPL-MCNC: 0.18 MG/DL (ref 0.3–1.2)
BILIRUB SERPL-MCNC: 0.19 MG/DL (ref 0.3–1.2)
BILIRUBIN URINE: NEGATIVE
BNP INTERPRETATION: ABNORMAL
BUN BLDV-MCNC: 31 MG/DL (ref 8–23)
BUN BLDV-MCNC: 32 MG/DL (ref 8–23)
BUN BLDV-MCNC: 34 MG/DL (ref 8–23)
BUN BLDV-MCNC: 35 MG/DL (ref 8–23)
BUN BLDV-MCNC: 36 MG/DL (ref 8–23)
BUN BLDV-MCNC: 37 MG/DL (ref 8–23)
BUN BLDV-MCNC: 41 MG/DL (ref 8–23)
BUN BLDV-MCNC: 44 MG/DL (ref 8–23)
BUN BLDV-MCNC: 44 MG/DL (ref 8–23)
BUN BLDV-MCNC: 47 MG/DL (ref 8–23)
BUN BLDV-MCNC: 49 MG/DL (ref 8–23)
BUN BLDV-MCNC: 50 MG/DL (ref 8–23)
BUN BLDV-MCNC: 51 MG/DL (ref 8–23)
BUN BLDV-MCNC: 55 MG/DL (ref 8–23)
BUN BLDV-MCNC: 55 MG/DL (ref 8–23)
BUN/CREAT BLD: 16 (ref 9–20)
BUN/CREAT BLD: 18 (ref 9–20)
BUN/CREAT BLD: 19 (ref 9–20)
BUN/CREAT BLD: 20 (ref 9–20)
BUN/CREAT BLD: 20 (ref 9–20)
BUN/CREAT BLD: 21 (ref 9–20)
BUN/CREAT BLD: 21 (ref 9–20)
BUN/CREAT BLD: 23 (ref 9–20)
BUN/CREAT BLD: 26 (ref 9–20)
BUN/CREAT BLD: 26 (ref 9–20)
BUN/CREAT BLD: 27 (ref 9–20)
BUN/CREAT BLD: 27 (ref 9–20)
BUN/CREAT BLD: 31 (ref 9–20)
BUN/CREAT BLD: 31 (ref 9–20)
CALCIUM IONIZED: 0.74 MMOL/L (ref 1.13–1.33)
CALCIUM IONIZED: 0.77 MMOL/L (ref 1.13–1.33)
CALCIUM SERPL-MCNC: 5.9 MG/DL (ref 8.6–10.4)
CALCIUM SERPL-MCNC: 6 MG/DL (ref 8.6–10.4)
CALCIUM SERPL-MCNC: 6.3 MG/DL (ref 8.6–10.4)
CALCIUM SERPL-MCNC: 6.5 MG/DL (ref 8.6–10.4)
CALCIUM SERPL-MCNC: 6.6 MG/DL (ref 8.6–10.4)
CALCIUM SERPL-MCNC: 6.9 MG/DL (ref 8.6–10.4)
CALCIUM SERPL-MCNC: 7.4 MG/DL (ref 8.6–10.4)
CALCIUM SERPL-MCNC: 7.8 MG/DL (ref 8.6–10.4)
CALCIUM SERPL-MCNC: 7.9 MG/DL (ref 8.6–10.4)
CALCIUM SERPL-MCNC: 7.9 MG/DL (ref 8.6–10.4)
CALCIUM SERPL-MCNC: 8.9 MG/DL (ref 8.6–10.4)
CALCIUM SERPL-MCNC: 9.2 MG/DL (ref 8.6–10.4)
CALCIUM SERPL-MCNC: 9.5 MG/DL (ref 8.6–10.4)
CASTS UA: ABNORMAL /LPF
CASTS UA: ABNORMAL /LPF
CASTS UA: ABNORMAL /LPF (ref 0–2)
CASTS UA: ABNORMAL /LPF (ref 0–2)
CASTS UA: NORMAL /LPF
CASTS UA: NORMAL /LPF
CASTS UA: NORMAL /LPF (ref 0–8)
CHLORIDE BLD-SCNC: 90 MMOL/L (ref 98–107)
CHLORIDE BLD-SCNC: 93 MMOL/L (ref 98–107)
CHLORIDE BLD-SCNC: 94 MMOL/L (ref 98–107)
CHLORIDE BLD-SCNC: 94 MMOL/L (ref 98–107)
CHLORIDE BLD-SCNC: 95 MMOL/L (ref 98–107)
CHLORIDE BLD-SCNC: 95 MMOL/L (ref 98–107)
CHLORIDE BLD-SCNC: 96 MMOL/L (ref 98–107)
CHLORIDE BLD-SCNC: 97 MMOL/L (ref 98–107)
CHLORIDE BLD-SCNC: 97 MMOL/L (ref 98–107)
CHLORIDE BLD-SCNC: 98 MMOL/L (ref 98–107)
CHLORIDE BLD-SCNC: 98 MMOL/L (ref 98–107)
CHLORIDE BLD-SCNC: 99 MMOL/L (ref 98–107)
CO2: 33 MMOL/L (ref 20–31)
CO2: 34 MMOL/L (ref 20–31)
CO2: 35 MMOL/L (ref 20–31)
CO2: 36 MMOL/L (ref 20–31)
CO2: 36 MMOL/L (ref 20–31)
CO2: 37 MMOL/L (ref 20–31)
CO2: 37 MMOL/L (ref 20–31)
CO2: 39 MMOL/L (ref 20–31)
COLOR: ABNORMAL
COLOR: ABNORMAL
COLOR: YELLOW
COMMENT UA: ABNORMAL
CREAT SERPL-MCNC: 1.73 MG/DL (ref 0.5–0.9)
CREAT SERPL-MCNC: 1.73 MG/DL (ref 0.5–0.9)
CREAT SERPL-MCNC: 1.74 MG/DL (ref 0.5–0.9)
CREAT SERPL-MCNC: 1.75 MG/DL (ref 0.5–0.9)
CREAT SERPL-MCNC: 1.75 MG/DL (ref 0.5–0.9)
CREAT SERPL-MCNC: 1.8 MG/DL (ref 0.5–0.9)
CREAT SERPL-MCNC: 1.85 MG/DL (ref 0.5–0.9)
CREAT SERPL-MCNC: 1.87 MG/DL (ref 0.5–0.9)
CREAT SERPL-MCNC: 1.89 MG/DL (ref 0.5–0.9)
CREAT SERPL-MCNC: 1.91 MG/DL (ref 0.5–0.9)
CREAT SERPL-MCNC: 1.91 MG/DL (ref 0.5–0.9)
CREAT SERPL-MCNC: 1.94 MG/DL (ref 0.5–0.9)
CREAT SERPL-MCNC: 1.94 MG/DL (ref 0.5–0.9)
CREAT SERPL-MCNC: 2.02 MG/DL (ref 0.5–0.9)
CREAT SERPL-MCNC: 2.12 MG/DL (ref 0.5–0.9)
CRYSTALS, UA: ABNORMAL /HPF
CRYSTALS, UA: NORMAL /HPF
CULTURE: ABNORMAL
CULTURE: NO GROWTH
CULTURE: NORMAL
DIFFERENTIAL TYPE: ABNORMAL
EKG ATRIAL RATE: 136 BPM
EKG ATRIAL RATE: 77 BPM
EKG Q-T INTERVAL: 402 MS
EKG Q-T INTERVAL: 514 MS
EKG QRS DURATION: 90 MS
EKG QRS DURATION: 92 MS
EKG QTC CALCULATION (BAZETT): 458 MS
EKG QTC CALCULATION (BAZETT): 473 MS
EKG R AXIS: -24 DEGREES
EKG R AXIS: -29 DEGREES
EKG T AXIS: 3 DEGREES
EKG T AXIS: 59 DEGREES
EKG VENTRICULAR RATE: 51 BPM
EKG VENTRICULAR RATE: 78 BPM
EOSINOPHILS RELATIVE PERCENT: 0 % (ref 1–4)
EOSINOPHILS RELATIVE PERCENT: 1 % (ref 1–4)
EOSINOPHILS RELATIVE PERCENT: 2 % (ref 1–4)
EOSINOPHILS RELATIVE PERCENT: 3 % (ref 1–4)
EOSINOPHILS RELATIVE PERCENT: 4 % (ref 1–4)
EOSINOPHILS RELATIVE PERCENT: 8 % (ref 1–4)
EPITHELIAL CELLS UA: ABNORMAL /HPF (ref 0–5)
EPITHELIAL CELLS UA: NORMAL /HPF (ref 0–5)
ESTIMATED AVERAGE GLUCOSE: 120 MG/DL
FERRITIN: 132 UG/L (ref 13–150)
FERRITIN: 212 UG/L (ref 13–150)
FIO2: 32
FOLATE: 12.8 NG/ML
FOLATE: 17.6 NG/ML
GFR AFRICAN AMERICAN: 28 ML/MIN
GFR AFRICAN AMERICAN: 29 ML/MIN
GFR AFRICAN AMERICAN: 31 ML/MIN
GFR AFRICAN AMERICAN: 32 ML/MIN
GFR AFRICAN AMERICAN: 33 ML/MIN
GFR AFRICAN AMERICAN: 35 ML/MIN
GFR NON-AFRICAN AMERICAN: 23 ML/MIN
GFR NON-AFRICAN AMERICAN: 24 ML/MIN
GFR NON-AFRICAN AMERICAN: 25 ML/MIN
GFR NON-AFRICAN AMERICAN: 25 ML/MIN
GFR NON-AFRICAN AMERICAN: 26 ML/MIN
GFR NON-AFRICAN AMERICAN: 27 ML/MIN
GFR NON-AFRICAN AMERICAN: 28 ML/MIN
GFR NON-AFRICAN AMERICAN: 29 ML/MIN
GFR SERPL CREATININE-BSD FRML MDRD: ABNORMAL ML/MIN/{1.73_M2}
GLUCOSE BLD-MCNC: 100 MG/DL (ref 65–105)
GLUCOSE BLD-MCNC: 104 MG/DL (ref 65–105)
GLUCOSE BLD-MCNC: 105 MG/DL (ref 65–105)
GLUCOSE BLD-MCNC: 105 MG/DL (ref 70–99)
GLUCOSE BLD-MCNC: 118 MG/DL (ref 70–99)
GLUCOSE BLD-MCNC: 122 MG/DL (ref 65–105)
GLUCOSE BLD-MCNC: 122 MG/DL (ref 65–105)
GLUCOSE BLD-MCNC: 122 MG/DL (ref 70–99)
GLUCOSE BLD-MCNC: 127 MG/DL (ref 65–105)
GLUCOSE BLD-MCNC: 137 MG/DL (ref 65–105)
GLUCOSE BLD-MCNC: 142 MG/DL (ref 65–105)
GLUCOSE BLD-MCNC: 145 MG/DL (ref 70–99)
GLUCOSE BLD-MCNC: 151 MG/DL (ref 65–105)
GLUCOSE BLD-MCNC: 153 MG/DL (ref 65–105)
GLUCOSE BLD-MCNC: 159 MG/DL (ref 65–105)
GLUCOSE BLD-MCNC: 164 MG/DL (ref 70–99)
GLUCOSE BLD-MCNC: 167 MG/DL (ref 65–105)
GLUCOSE BLD-MCNC: 169 MG/DL (ref 65–105)
GLUCOSE BLD-MCNC: 170 MG/DL (ref 65–105)
GLUCOSE BLD-MCNC: 170 MG/DL (ref 70–99)
GLUCOSE BLD-MCNC: 173 MG/DL (ref 65–105)
GLUCOSE BLD-MCNC: 186 MG/DL (ref 65–105)
GLUCOSE BLD-MCNC: 188 MG/DL (ref 65–105)
GLUCOSE BLD-MCNC: 191 MG/DL (ref 70–99)
GLUCOSE BLD-MCNC: 193 MG/DL (ref 70–99)
GLUCOSE BLD-MCNC: 194 MG/DL (ref 65–105)
GLUCOSE BLD-MCNC: 196 MG/DL (ref 65–105)
GLUCOSE BLD-MCNC: 199 MG/DL (ref 65–105)
GLUCOSE BLD-MCNC: 200 MG/DL (ref 65–105)
GLUCOSE BLD-MCNC: 202 MG/DL (ref 65–105)
GLUCOSE BLD-MCNC: 205 MG/DL (ref 65–105)
GLUCOSE BLD-MCNC: 218 MG/DL (ref 65–105)
GLUCOSE BLD-MCNC: 220 MG/DL (ref 65–105)
GLUCOSE BLD-MCNC: 227 MG/DL (ref 65–105)
GLUCOSE BLD-MCNC: 234 MG/DL (ref 65–105)
GLUCOSE BLD-MCNC: 237 MG/DL (ref 70–99)
GLUCOSE BLD-MCNC: 239 MG/DL (ref 65–105)
GLUCOSE BLD-MCNC: 241 MG/DL (ref 65–105)
GLUCOSE BLD-MCNC: 245 MG/DL (ref 65–105)
GLUCOSE BLD-MCNC: 247 MG/DL (ref 65–105)
GLUCOSE BLD-MCNC: 249 MG/DL (ref 65–105)
GLUCOSE BLD-MCNC: 250 MG/DL (ref 65–105)
GLUCOSE BLD-MCNC: 259 MG/DL (ref 65–105)
GLUCOSE BLD-MCNC: 260 MG/DL (ref 65–105)
GLUCOSE BLD-MCNC: 260 MG/DL (ref 65–105)
GLUCOSE BLD-MCNC: 260 MG/DL (ref 70–99)
GLUCOSE BLD-MCNC: 261 MG/DL (ref 65–105)
GLUCOSE BLD-MCNC: 264 MG/DL (ref 70–99)
GLUCOSE BLD-MCNC: 271 MG/DL (ref 70–99)
GLUCOSE BLD-MCNC: 272 MG/DL (ref 65–105)
GLUCOSE BLD-MCNC: 291 MG/DL (ref 65–105)
GLUCOSE BLD-MCNC: 332 MG/DL (ref 65–105)
GLUCOSE BLD-MCNC: 335 MG/DL (ref 65–105)
GLUCOSE BLD-MCNC: 350 MG/DL (ref 65–105)
GLUCOSE BLD-MCNC: 92 MG/DL (ref 70–99)
GLUCOSE BLD-MCNC: 98 MG/DL (ref 70–99)
GLUCOSE URINE: ABNORMAL
GLUCOSE URINE: ABNORMAL
GLUCOSE URINE: NEGATIVE
HBA1C MFR BLD: 5.8 % (ref 4–6)
HCT VFR BLD CALC: 24.4 % (ref 36.3–47.1)
HCT VFR BLD CALC: 27.6 % (ref 36.3–47.1)
HCT VFR BLD CALC: 28.2 % (ref 36.3–47.1)
HCT VFR BLD CALC: 28.8 % (ref 36.3–47.1)
HCT VFR BLD CALC: 29.7 % (ref 36.3–47.1)
HCT VFR BLD CALC: 30 % (ref 36.3–47.1)
HCT VFR BLD CALC: 30.2 % (ref 36.3–47.1)
HCT VFR BLD CALC: 30.6 % (ref 36.3–47.1)
HCT VFR BLD CALC: 30.6 % (ref 36.3–47.1)
HCT VFR BLD CALC: 30.7 % (ref 36.3–47.1)
HCT VFR BLD CALC: 31 % (ref 36.3–47.1)
HCT VFR BLD CALC: 31 % (ref 36.3–47.1)
HCT VFR BLD CALC: 31.2 % (ref 36.3–47.1)
HCT VFR BLD CALC: 31.3 % (ref 36.3–47.1)
HCT VFR BLD CALC: 31.5 % (ref 36.3–47.1)
HCT VFR BLD CALC: 31.6 % (ref 36.3–47.1)
HCT VFR BLD CALC: 31.8 % (ref 36.3–47.1)
HCT VFR BLD CALC: 31.8 % (ref 36.3–47.1)
HCT VFR BLD CALC: 32.3 % (ref 36.3–47.1)
HCT VFR BLD CALC: 33.9 % (ref 36.3–47.1)
HCT VFR BLD CALC: 34.6 % (ref 36.3–47.1)
HEMOGLOBIN: 10 G/DL (ref 11.9–15.1)
HEMOGLOBIN: 7.3 G/DL (ref 11.9–15.1)
HEMOGLOBIN: 8.2 G/DL (ref 11.9–15.1)
HEMOGLOBIN: 8.2 G/DL (ref 11.9–15.1)
HEMOGLOBIN: 8.5 G/DL (ref 11.9–15.1)
HEMOGLOBIN: 8.5 G/DL (ref 11.9–15.1)
HEMOGLOBIN: 8.6 G/DL (ref 11.9–15.1)
HEMOGLOBIN: 8.8 G/DL (ref 11.9–15.1)
HEMOGLOBIN: 8.8 G/DL (ref 11.9–15.1)
HEMOGLOBIN: 8.9 G/DL (ref 11.9–15.1)
HEMOGLOBIN: 9 G/DL (ref 11.9–15.1)
HEMOGLOBIN: 9 G/DL (ref 11.9–15.1)
HEMOGLOBIN: 9.1 G/DL (ref 11.9–15.1)
HEMOGLOBIN: 9.2 G/DL (ref 11.9–15.1)
HEMOGLOBIN: 9.3 G/DL (ref 11.9–15.1)
HEMOGLOBIN: 9.9 G/DL (ref 11.9–15.1)
IMMATURE GRANULOCYTES: 0 %
IMMATURE GRANULOCYTES: 0 %
IMMATURE GRANULOCYTES: 1 %
IMMATURE GRANULOCYTES: 10 %
IMMATURE GRANULOCYTES: 2 %
IMMATURE GRANULOCYTES: 2 %
IMMATURE GRANULOCYTES: 3 %
IMMATURE GRANULOCYTES: 4 %
IMMATURE GRANULOCYTES: 5 %
IMMATURE GRANULOCYTES: 6 %
IMMATURE GRANULOCYTES: 7 %
IMMATURE GRANULOCYTES: 7 %
IRON SATURATION: 23 % (ref 20–55)
IRON SATURATION: 28 % (ref 20–55)
IRON: 59 UG/DL (ref 37–145)
IRON: 71 UG/DL (ref 37–145)
KETONES, URINE: NEGATIVE
LACTIC ACID: 0.9 MMOL/L (ref 0.5–2.2)
LEUKOCYTE ESTERASE, URINE: ABNORMAL
LEUKOCYTE ESTERASE, URINE: NEGATIVE
LYMPHOCYTES # BLD: 10 % (ref 24–44)
LYMPHOCYTES # BLD: 10 % (ref 24–44)
LYMPHOCYTES # BLD: 12 % (ref 24–43)
LYMPHOCYTES # BLD: 13 % (ref 24–43)
LYMPHOCYTES # BLD: 15 % (ref 24–43)
LYMPHOCYTES # BLD: 17 % (ref 24–43)
LYMPHOCYTES # BLD: 18 % (ref 24–43)
LYMPHOCYTES # BLD: 18 % (ref 24–44)
LYMPHOCYTES # BLD: 20 % (ref 24–43)
LYMPHOCYTES # BLD: 20 % (ref 24–43)
LYMPHOCYTES # BLD: 22 % (ref 24–43)
LYMPHOCYTES # BLD: 22 % (ref 24–43)
LYMPHOCYTES # BLD: 5 % (ref 24–44)
LYMPHOCYTES # BLD: 6 % (ref 24–43)
LYMPHOCYTES # BLD: 6 % (ref 24–43)
LYMPHOCYTES # BLD: 6 % (ref 24–44)
LYMPHOCYTES # BLD: 6 % (ref 24–44)
Lab: ABNORMAL
Lab: NORMAL
MAGNESIUM: 1.6 MG/DL (ref 1.6–2.6)
MAGNESIUM: 1.6 MG/DL (ref 1.6–2.6)
MAGNESIUM: 1.7 MG/DL (ref 1.6–2.6)
MAGNESIUM: 1.9 MG/DL (ref 1.6–2.6)
MCH RBC QN AUTO: 31.1 PG (ref 25.2–33.5)
MCH RBC QN AUTO: 31.1 PG (ref 25.2–33.5)
MCH RBC QN AUTO: 31.3 PG (ref 25.2–33.5)
MCH RBC QN AUTO: 31.4 PG (ref 25.2–33.5)
MCH RBC QN AUTO: 31.4 PG (ref 25.2–33.5)
MCH RBC QN AUTO: 31.5 PG (ref 25.2–33.5)
MCH RBC QN AUTO: 31.5 PG (ref 25.2–33.5)
MCH RBC QN AUTO: 31.6 PG (ref 25.2–33.5)
MCH RBC QN AUTO: 31.7 PG (ref 25.2–33.5)
MCH RBC QN AUTO: 31.8 PG (ref 25.2–33.5)
MCH RBC QN AUTO: 31.9 PG (ref 25.2–33.5)
MCH RBC QN AUTO: 31.9 PG (ref 25.2–33.5)
MCH RBC QN AUTO: 32 PG (ref 25.2–33.5)
MCH RBC QN AUTO: 32.3 PG (ref 25.2–33.5)
MCH RBC QN AUTO: 32.3 PG (ref 25.2–33.5)
MCH RBC QN AUTO: 32.4 PG (ref 25.2–33.5)
MCH RBC QN AUTO: 32.7 PG (ref 25.2–33.5)
MCHC RBC AUTO-ENTMCNC: 27.9 G/DL (ref 28.4–34.8)
MCHC RBC AUTO-ENTMCNC: 28.1 G/DL (ref 28.4–34.8)
MCHC RBC AUTO-ENTMCNC: 28.3 G/DL (ref 28.4–34.8)
MCHC RBC AUTO-ENTMCNC: 28.4 G/DL (ref 28.4–34.8)
MCHC RBC AUTO-ENTMCNC: 28.6 G/DL (ref 28.4–34.8)
MCHC RBC AUTO-ENTMCNC: 29 G/DL (ref 28.4–34.8)
MCHC RBC AUTO-ENTMCNC: 29 G/DL (ref 28.4–34.8)
MCHC RBC AUTO-ENTMCNC: 29.1 G/DL (ref 28.4–34.8)
MCHC RBC AUTO-ENTMCNC: 29.2 G/DL (ref 28.4–34.8)
MCHC RBC AUTO-ENTMCNC: 29.3 G/DL (ref 28.4–34.8)
MCHC RBC AUTO-ENTMCNC: 29.4 G/DL (ref 28.4–34.8)
MCHC RBC AUTO-ENTMCNC: 29.5 G/DL (ref 28.4–34.8)
MCHC RBC AUTO-ENTMCNC: 29.5 G/DL (ref 28.4–34.8)
MCHC RBC AUTO-ENTMCNC: 29.7 G/DL (ref 28.4–34.8)
MCHC RBC AUTO-ENTMCNC: 29.8 G/DL (ref 28.4–34.8)
MCHC RBC AUTO-ENTMCNC: 29.9 G/DL (ref 28.4–34.8)
MCV RBC AUTO: 106.4 FL (ref 82.6–102.9)
MCV RBC AUTO: 106.4 FL (ref 82.6–102.9)
MCV RBC AUTO: 106.6 FL (ref 82.6–102.9)
MCV RBC AUTO: 107.1 FL (ref 82.6–102.9)
MCV RBC AUTO: 107.1 FL (ref 82.6–102.9)
MCV RBC AUTO: 107.2 FL (ref 82.6–102.9)
MCV RBC AUTO: 107.7 FL (ref 82.6–102.9)
MCV RBC AUTO: 108 FL (ref 82.6–102.9)
MCV RBC AUTO: 108.3 FL (ref 82.6–102.9)
MCV RBC AUTO: 108.5 FL (ref 82.6–102.9)
MCV RBC AUTO: 108.7 FL (ref 82.6–102.9)
MCV RBC AUTO: 108.7 FL (ref 82.6–102.9)
MCV RBC AUTO: 108.8 FL (ref 82.6–102.9)
MCV RBC AUTO: 108.9 FL (ref 82.6–102.9)
MCV RBC AUTO: 109.3 FL (ref 82.6–102.9)
MCV RBC AUTO: 109.4 FL (ref 82.6–102.9)
MCV RBC AUTO: 109.7 FL (ref 82.6–102.9)
MCV RBC AUTO: 109.8 FL (ref 82.6–102.9)
MCV RBC AUTO: 111 FL (ref 82.6–102.9)
MCV RBC AUTO: 111.3 FL (ref 82.6–102.9)
MCV RBC AUTO: 111.9 FL (ref 82.6–102.9)
MCV RBC AUTO: 112.8 FL (ref 82.6–102.9)
MCV RBC AUTO: 113.7 FL (ref 82.6–102.9)
MONOCYTES # BLD: 10 % (ref 3–12)
MONOCYTES # BLD: 4 % (ref 1–7)
MONOCYTES # BLD: 4 % (ref 3–12)
MONOCYTES # BLD: 5 % (ref 1–7)
MONOCYTES # BLD: 5 % (ref 1–7)
MONOCYTES # BLD: 5 % (ref 3–12)
MONOCYTES # BLD: 6 % (ref 1–7)
MONOCYTES # BLD: 6 % (ref 1–7)
MONOCYTES # BLD: 7 % (ref 1–7)
MONOCYTES # BLD: 7 % (ref 3–12)
MONOCYTES # BLD: 8 % (ref 1–7)
MONOCYTES # BLD: 8 % (ref 1–7)
MONOCYTES # BLD: 8 % (ref 3–12)
MONOCYTES # BLD: 9 % (ref 3–12)
MONOCYTES # BLD: 9 % (ref 3–12)
MORPHOLOGY: ABNORMAL
MUCUS: ABNORMAL
MUCUS: NORMAL
MYOGLOBIN: 110 NG/ML (ref 25–58)
MYOGLOBIN: 97 NG/ML (ref 25–58)
NEGATIVE BASE EXCESS, ART: ABNORMAL (ref 0–2)
NITRITE, URINE: NEGATIVE
NRBC AUTOMATED: 0 PER 100 WBC
NRBC AUTOMATED: 0.1 PER 100 WBC
NRBC AUTOMATED: 0.2 PER 100 WBC
NRBC AUTOMATED: 0.3 PER 100 WBC
NRBC AUTOMATED: 0.4 PER 100 WBC
NRBC AUTOMATED: 0.5 PER 100 WBC
NRBC AUTOMATED: ABNORMAL PER 100 WBC
O2 DEVICE/FLOW/%: ABNORMAL
OTHER OBSERVATIONS UA: ABNORMAL
OTHER OBSERVATIONS UA: NORMAL
PATIENT TEMP: 37
PDW BLD-RTO: 13.8 % (ref 11.8–14.4)
PDW BLD-RTO: 14 % (ref 11.8–14.4)
PDW BLD-RTO: 14.3 % (ref 11.8–14.4)
PDW BLD-RTO: 14.4 % (ref 11.8–14.4)
PDW BLD-RTO: 14.4 % (ref 11.8–14.4)
PDW BLD-RTO: 14.5 % (ref 11.8–14.4)
PDW BLD-RTO: 14.6 % (ref 11.8–14.4)
PDW BLD-RTO: 15 % (ref 11.8–14.4)
PDW BLD-RTO: 15.5 % (ref 11.8–14.4)
PDW BLD-RTO: 15.6 % (ref 11.8–14.4)
PDW BLD-RTO: 15.6 % (ref 11.8–14.4)
PDW BLD-RTO: 15.7 % (ref 11.8–14.4)
PDW BLD-RTO: 15.8 % (ref 11.8–14.4)
PDW BLD-RTO: 15.9 % (ref 11.8–14.4)
PDW BLD-RTO: 16 % (ref 11.8–14.4)
PDW BLD-RTO: 16 % (ref 11.8–14.4)
PDW BLD-RTO: 16.1 % (ref 11.8–14.4)
PDW BLD-RTO: 16.4 % (ref 11.8–14.4)
PH UA: 5 (ref 5–8)
PH UA: 5 (ref 5–8)
PH UA: 6 (ref 5–8)
PH UA: 6.5 (ref 5–8)
PHOSPHORUS: 4.1 MG/DL (ref 2.6–4.5)
PHOSPHORUS: 4.3 MG/DL (ref 2.6–4.5)
PHOSPHORUS: 4.6 MG/DL (ref 2.6–4.5)
PHOSPHORUS: 5.5 MG/DL (ref 2.6–4.5)
PLATELET # BLD: 100 K/UL (ref 138–453)
PLATELET # BLD: 103 K/UL (ref 138–453)
PLATELET # BLD: 108 K/UL (ref 138–453)
PLATELET # BLD: 111 K/UL (ref 138–453)
PLATELET # BLD: 112 K/UL (ref 138–453)
PLATELET # BLD: 113 K/UL (ref 138–453)
PLATELET # BLD: 116 K/UL (ref 138–453)
PLATELET # BLD: 116 K/UL (ref 138–453)
PLATELET # BLD: 118 K/UL (ref 138–453)
PLATELET # BLD: 123 K/UL (ref 138–453)
PLATELET # BLD: 123 K/UL (ref 138–453)
PLATELET # BLD: 132 K/UL (ref 138–453)
PLATELET # BLD: 137 K/UL (ref 138–453)
PLATELET # BLD: 138 K/UL (ref 138–453)
PLATELET # BLD: 66 K/UL (ref 138–453)
PLATELET # BLD: 70 K/UL (ref 138–453)
PLATELET # BLD: 79 K/UL (ref 138–453)
PLATELET # BLD: 85 K/UL (ref 138–453)
PLATELET # BLD: 85 K/UL (ref 138–453)
PLATELET # BLD: 94 K/UL (ref 138–453)
PLATELET # BLD: 94 K/UL (ref 138–453)
PLATELET ESTIMATE: ABNORMAL
PMV BLD AUTO: 10.4 FL (ref 8.1–13.5)
PMV BLD AUTO: 10.5 FL (ref 8.1–13.5)
PMV BLD AUTO: 10.6 FL (ref 8.1–13.5)
PMV BLD AUTO: 10.7 FL (ref 8.1–13.5)
PMV BLD AUTO: 10.7 FL (ref 8.1–13.5)
PMV BLD AUTO: 10.8 FL (ref 8.1–13.5)
PMV BLD AUTO: 10.8 FL (ref 8.1–13.5)
PMV BLD AUTO: 10.9 FL (ref 8.1–13.5)
PMV BLD AUTO: 11 FL (ref 8.1–13.5)
PMV BLD AUTO: 11.1 FL (ref 8.1–13.5)
PMV BLD AUTO: 11.2 FL (ref 8.1–13.5)
PMV BLD AUTO: 11.2 FL (ref 8.1–13.5)
PMV BLD AUTO: 11.4 FL (ref 8.1–13.5)
PMV BLD AUTO: 11.4 FL (ref 8.1–13.5)
PMV BLD AUTO: 11.5 FL (ref 8.1–13.5)
PMV BLD AUTO: 11.5 FL (ref 8.1–13.5)
PMV BLD AUTO: 11.6 FL (ref 8.1–13.5)
PMV BLD AUTO: 11.7 FL (ref 8.1–13.5)
POC HCO3: 33.2 MMOL/L (ref 22–27)
POC O2 SATURATION: 97 %
POC PCO2 TEMP: ABNORMAL MM HG
POC PCO2: 66 MM HG (ref 32–45)
POC PH TEMP: ABNORMAL
POC PH: 7.31 (ref 7.35–7.45)
POC PO2 TEMP: ABNORMAL MM HG
POC PO2: 103 MM HG (ref 75–95)
POSITIVE BASE EXCESS, ART: 7 (ref 0–2)
POTASSIUM SERPL-SCNC: 4 MMOL/L (ref 3.7–5.3)
POTASSIUM SERPL-SCNC: 4.1 MMOL/L (ref 3.7–5.3)
POTASSIUM SERPL-SCNC: 4.2 MMOL/L (ref 3.7–5.3)
POTASSIUM SERPL-SCNC: 4.3 MMOL/L (ref 3.7–5.3)
POTASSIUM SERPL-SCNC: 4.3 MMOL/L (ref 3.7–5.3)
POTASSIUM SERPL-SCNC: 4.4 MMOL/L (ref 3.7–5.3)
POTASSIUM SERPL-SCNC: 4.5 MMOL/L (ref 3.7–5.3)
POTASSIUM SERPL-SCNC: 4.5 MMOL/L (ref 3.7–5.3)
POTASSIUM SERPL-SCNC: 4.6 MMOL/L (ref 3.7–5.3)
POTASSIUM SERPL-SCNC: 4.7 MMOL/L (ref 3.7–5.3)
POTASSIUM SERPL-SCNC: 4.7 MMOL/L (ref 3.7–5.3)
POTASSIUM SERPL-SCNC: 4.8 MMOL/L (ref 3.7–5.3)
PRO-BNP: 1881 PG/ML
PRO-BNP: 319 PG/ML
PRO-BNP: 749 PG/ML
PROTEIN UA: ABNORMAL
PROTEIN UA: NEGATIVE
PTH INTACT: 7.57 PG/ML (ref 15–65)
RBC # BLD: 2.23 M/UL (ref 3.95–5.11)
RBC # BLD: 2.54 M/UL (ref 3.95–5.11)
RBC # BLD: 2.6 M/UL (ref 3.95–5.11)
RBC # BLD: 2.69 M/UL (ref 3.95–5.11)
RBC # BLD: 2.72 M/UL (ref 3.95–5.11)
RBC # BLD: 2.73 M/UL (ref 3.95–5.11)
RBC # BLD: 2.75 M/UL (ref 3.95–5.11)
RBC # BLD: 2.77 M/UL (ref 3.95–5.11)
RBC # BLD: 2.77 M/UL (ref 3.95–5.11)
RBC # BLD: 2.81 M/UL (ref 3.95–5.11)
RBC # BLD: 2.82 M/UL (ref 3.95–5.11)
RBC # BLD: 2.84 M/UL (ref 3.95–5.11)
RBC # BLD: 2.84 M/UL (ref 3.95–5.11)
RBC # BLD: 2.87 M/UL (ref 3.95–5.11)
RBC # BLD: 2.88 M/UL (ref 3.95–5.11)
RBC # BLD: 2.88 M/UL (ref 3.95–5.11)
RBC # BLD: 2.91 M/UL (ref 3.95–5.11)
RBC # BLD: 2.92 M/UL (ref 3.95–5.11)
RBC # BLD: 2.94 M/UL (ref 3.95–5.11)
RBC # BLD: 2.96 M/UL (ref 3.95–5.11)
RBC # BLD: 2.96 M/UL (ref 3.95–5.11)
RBC # BLD: 3.15 M/UL (ref 3.95–5.11)
RBC # BLD: 3.18 M/UL (ref 3.95–5.11)
RBC # BLD: ABNORMAL 10*6/UL
RBC UA: ABNORMAL /HPF (ref 0–2)
RBC UA: NORMAL /HPF (ref 0–2)
RBC UA: NORMAL /HPF (ref 0–2)
RBC UA: NORMAL /HPF (ref 0–4)
RENAL EPITHELIAL, UA: ABNORMAL /HPF
RENAL EPITHELIAL, UA: NORMAL /HPF
SARS-COV-2, NAA: NOT DETECTED
SARS-COV-2, RAPID: NORMAL
SARS-COV-2, RAPID: NOT DETECTED
SARS-COV-2: NORMAL
SARS-COV-2: NOT DETECTED
SEG NEUTROPHILS: 63 % (ref 36–65)
SEG NEUTROPHILS: 64 % (ref 36–65)
SEG NEUTROPHILS: 64 % (ref 36–65)
SEG NEUTROPHILS: 68 % (ref 36–65)
SEG NEUTROPHILS: 68 % (ref 36–65)
SEG NEUTROPHILS: 69 % (ref 36–65)
SEG NEUTROPHILS: 70 % (ref 36–65)
SEG NEUTROPHILS: 71 % (ref 36–65)
SEG NEUTROPHILS: 71 % (ref 36–66)
SEG NEUTROPHILS: 75 % (ref 36–65)
SEG NEUTROPHILS: 76 % (ref 36–66)
SEG NEUTROPHILS: 77 % (ref 36–65)
SEG NEUTROPHILS: 77 % (ref 36–65)
SEG NEUTROPHILS: 79 % (ref 36–66)
SEG NEUTROPHILS: 81 % (ref 36–66)
SEG NEUTROPHILS: 81 % (ref 36–66)
SEG NEUTROPHILS: 83 % (ref 36–66)
SEG NEUTROPHILS: 84 % (ref 36–66)
SEG NEUTROPHILS: 85 % (ref 36–65)
SEG NEUTROPHILS: 85 % (ref 36–66)
SEG NEUTROPHILS: 87 % (ref 36–65)
SEGMENTED NEUTROPHILS ABSOLUTE COUNT: 10.28 K/UL (ref 1.8–7.7)
SEGMENTED NEUTROPHILS ABSOLUTE COUNT: 10.79 K/UL (ref 1.8–7.7)
SEGMENTED NEUTROPHILS ABSOLUTE COUNT: 12.57 K/UL (ref 1.8–7.7)
SEGMENTED NEUTROPHILS ABSOLUTE COUNT: 13.51 K/UL (ref 1.8–7.7)
SEGMENTED NEUTROPHILS ABSOLUTE COUNT: 3.88 K/UL (ref 1.5–8.1)
SEGMENTED NEUTROPHILS ABSOLUTE COUNT: 4.01 K/UL (ref 1.5–8.1)
SEGMENTED NEUTROPHILS ABSOLUTE COUNT: 4.08 K/UL (ref 1.5–8.1)
SEGMENTED NEUTROPHILS ABSOLUTE COUNT: 4.48 K/UL (ref 1.5–8.1)
SEGMENTED NEUTROPHILS ABSOLUTE COUNT: 4.56 K/UL (ref 1.5–8.1)
SEGMENTED NEUTROPHILS ABSOLUTE COUNT: 4.6 K/UL (ref 1.5–8.1)
SEGMENTED NEUTROPHILS ABSOLUTE COUNT: 4.67 K/UL (ref 1.5–8.1)
SEGMENTED NEUTROPHILS ABSOLUTE COUNT: 4.87 K/UL (ref 1.5–8.1)
SEGMENTED NEUTROPHILS ABSOLUTE COUNT: 5.19 K/UL (ref 1.8–7.7)
SEGMENTED NEUTROPHILS ABSOLUTE COUNT: 5.42 K/UL (ref 1.8–7.7)
SEGMENTED NEUTROPHILS ABSOLUTE COUNT: 5.72 K/UL (ref 1.5–8.1)
SEGMENTED NEUTROPHILS ABSOLUTE COUNT: 6 K/UL (ref 1.5–8.1)
SEGMENTED NEUTROPHILS ABSOLUTE COUNT: 6.49 K/UL (ref 1.5–8.1)
SEGMENTED NEUTROPHILS ABSOLUTE COUNT: 8.96 K/UL (ref 1.5–8.1)
SEGMENTED NEUTROPHILS ABSOLUTE COUNT: 9.08 K/UL (ref 1.8–7.7)
SEGMENTED NEUTROPHILS ABSOLUTE COUNT: 9.37 K/UL (ref 1.8–7.7)
SEGMENTED NEUTROPHILS ABSOLUTE COUNT: 9.52 K/UL (ref 1.5–8.1)
SODIUM BLD-SCNC: 137 MMOL/L (ref 135–144)
SODIUM BLD-SCNC: 138 MMOL/L (ref 135–144)
SODIUM BLD-SCNC: 139 MMOL/L (ref 135–144)
SODIUM BLD-SCNC: 139 MMOL/L (ref 135–144)
SODIUM BLD-SCNC: 140 MMOL/L (ref 135–144)
SODIUM BLD-SCNC: 140 MMOL/L (ref 135–144)
SODIUM BLD-SCNC: 141 MMOL/L (ref 135–144)
SODIUM BLD-SCNC: 142 MMOL/L (ref 135–144)
SODIUM BLD-SCNC: 143 MMOL/L (ref 135–144)
SOURCE: NORMAL
SOURCE: NORMAL
SPECIFIC GRAVITY UA: 1.01 (ref 1–1.03)
SPECIFIC GRAVITY UA: 1.01 (ref 1–1.03)
SPECIFIC GRAVITY UA: 1.02 (ref 1–1.03)
SPECIMEN DESCRIPTION: ABNORMAL
SPECIMEN DESCRIPTION: NORMAL
STATUS: NORMAL
TCO2 (CALC), ART: 35 MMOL/L (ref 23–28)
TOTAL IRON BINDING CAPACITY: 254 UG/DL (ref 250–450)
TOTAL IRON BINDING CAPACITY: 254 UG/DL (ref 250–450)
TOTAL PROTEIN: 6 G/DL (ref 6.4–8.3)
TOTAL PROTEIN: 6.7 G/DL (ref 6.4–8.3)
TRICHOMONAS: ABNORMAL
TRICHOMONAS: NORMAL
TROPONIN INTERP: ABNORMAL
TROPONIN T: ABNORMAL NG/ML
TROPONIN, HIGH SENSITIVITY: 23 NG/L (ref 0–14)
TROPONIN, HIGH SENSITIVITY: 28 NG/L (ref 0–14)
TROPONIN, HIGH SENSITIVITY: 30 NG/L (ref 0–14)
TURBIDITY: ABNORMAL
TURBIDITY: CLEAR
TURBIDITY: CLEAR
UNSATURATED IRON BINDING CAPACITY: 183 UG/DL (ref 112–347)
UNSATURATED IRON BINDING CAPACITY: 195 UG/DL (ref 112–347)
URINE HGB: ABNORMAL
UROBILINOGEN, URINE: NORMAL
UROTHELIAL CANCER DETECTION: NORMAL
VITAMIN B-12: >2000 PG/ML (ref 232–1245)
VITAMIN B-12: >2000 PG/ML (ref 232–1245)
VITAMIN D 25-HYDROXY: 50.6 NG/ML (ref 30–100)
VITAMIN D 25-HYDROXY: 57.4 NG/ML (ref 30–100)
WBC # BLD: 10.3 K/UL (ref 3.5–11.3)
WBC # BLD: 11.2 K/UL (ref 3.5–11.3)
WBC # BLD: 11.3 K/UL (ref 3.5–11.3)
WBC # BLD: 11.5 K/UL (ref 3.5–11.3)
WBC # BLD: 12.6 K/UL (ref 3.5–11.3)
WBC # BLD: 12.7 K/UL (ref 3.5–11.3)
WBC # BLD: 14.2 K/UL (ref 3.5–11.3)
WBC # BLD: 14.8 K/UL (ref 3.5–11.3)
WBC # BLD: 16.1 K/UL (ref 3.5–11.3)
WBC # BLD: 5.5 K/UL (ref 3.5–11.3)
WBC # BLD: 5.8 K/UL (ref 3.5–11.3)
WBC # BLD: 5.8 K/UL (ref 3.5–11.3)
WBC # BLD: 6.3 K/UL (ref 3.5–11.3)
WBC # BLD: 6.5 K/UL (ref 3.5–11.3)
WBC # BLD: 6.5 K/UL (ref 3.5–11.3)
WBC # BLD: 6.7 K/UL (ref 3.5–11.3)
WBC # BLD: 6.7 K/UL (ref 3.5–11.3)
WBC # BLD: 7.2 K/UL (ref 3.5–11.3)
WBC # BLD: 7.3 K/UL (ref 3.5–11.3)
WBC # BLD: 7.4 K/UL (ref 3.5–11.3)
WBC # BLD: 7.6 K/UL (ref 3.5–11.3)
WBC # BLD: 8.6 K/UL (ref 3.5–11.3)
WBC # BLD: 8.7 K/UL (ref 3.5–11.3)
WBC # BLD: ABNORMAL 10*3/UL
WBC UA: ABNORMAL /HPF (ref 0–5)
WBC UA: NORMAL /HPF (ref 0–5)
YEAST: ABNORMAL
YEAST: NORMAL

## 2020-01-01 PROCEDURE — 96372 THER/PROPH/DIAG INJ SC/IM: CPT

## 2020-01-01 PROCEDURE — 83880 ASSAY OF NATRIURETIC PEPTIDE: CPT

## 2020-01-01 PROCEDURE — 6360000002 HC RX W HCPCS: Performed by: INTERNAL MEDICINE

## 2020-01-01 PROCEDURE — 82728 ASSAY OF FERRITIN: CPT

## 2020-01-01 PROCEDURE — 2580000003 HC RX 258: Performed by: INTERNAL MEDICINE

## 2020-01-01 PROCEDURE — 80053 COMPREHEN METABOLIC PANEL: CPT

## 2020-01-01 PROCEDURE — 80048 BASIC METABOLIC PNL TOTAL CA: CPT

## 2020-01-01 PROCEDURE — 71045 X-RAY EXAM CHEST 1 VIEW: CPT

## 2020-01-01 PROCEDURE — 2700000000 HC OXYGEN THERAPY PER DAY

## 2020-01-01 PROCEDURE — 97530 THERAPEUTIC ACTIVITIES: CPT

## 2020-01-01 PROCEDURE — 83874 ASSAY OF MYOGLOBIN: CPT

## 2020-01-01 PROCEDURE — 1036F TOBACCO NON-USER: CPT | Performed by: INTERNAL MEDICINE

## 2020-01-01 PROCEDURE — 99211 OFF/OP EST MAY X REQ PHY/QHP: CPT | Performed by: INTERNAL MEDICINE

## 2020-01-01 PROCEDURE — 85025 COMPLETE CBC W/AUTO DIFF WBC: CPT

## 2020-01-01 PROCEDURE — 97167 OT EVAL HIGH COMPLEX 60 MIN: CPT

## 2020-01-01 PROCEDURE — 97116 GAIT TRAINING THERAPY: CPT

## 2020-01-01 PROCEDURE — 94640 AIRWAY INHALATION TREATMENT: CPT

## 2020-01-01 PROCEDURE — 36415 COLL VENOUS BLD VENIPUNCTURE: CPT

## 2020-01-01 PROCEDURE — 6360000002 HC RX W HCPCS: Performed by: NURSE PRACTITIONER

## 2020-01-01 PROCEDURE — 2580000003 HC RX 258: Performed by: NURSE ANESTHETIST, CERTIFIED REGISTERED

## 2020-01-01 PROCEDURE — 3017F COLORECTAL CA SCREEN DOC REV: CPT | Performed by: ORTHOPAEDIC SURGERY

## 2020-01-01 PROCEDURE — 94761 N-INVAS EAR/PLS OXIMETRY MLT: CPT

## 2020-01-01 PROCEDURE — 6360000002 HC RX W HCPCS: Performed by: UROLOGY

## 2020-01-01 PROCEDURE — 1036F TOBACCO NON-USER: CPT | Performed by: ORTHOPAEDIC SURGERY

## 2020-01-01 PROCEDURE — 6370000000 HC RX 637 (ALT 250 FOR IP): Performed by: INTERNAL MEDICINE

## 2020-01-01 PROCEDURE — 97530 THERAPEUTIC ACTIVITIES: CPT | Performed by: NURSE PRACTITIONER

## 2020-01-01 PROCEDURE — 2060000000 HC ICU INTERMEDIATE R&B

## 2020-01-01 PROCEDURE — G8400 PT W/DXA NO RESULTS DOC: HCPCS | Performed by: INTERNAL MEDICINE

## 2020-01-01 PROCEDURE — 94660 CPAP INITIATION&MGMT: CPT

## 2020-01-01 PROCEDURE — 6360000002 HC RX W HCPCS: Performed by: SPECIALIST

## 2020-01-01 PROCEDURE — 84100 ASSAY OF PHOSPHORUS: CPT

## 2020-01-01 PROCEDURE — 87086 URINE CULTURE/COLONY COUNT: CPT

## 2020-01-01 PROCEDURE — 84520 ASSAY OF UREA NITROGEN: CPT

## 2020-01-01 PROCEDURE — 2580000003 HC RX 258: Performed by: UROLOGY

## 2020-01-01 PROCEDURE — 97110 THERAPEUTIC EXERCISES: CPT

## 2020-01-01 PROCEDURE — 83540 ASSAY OF IRON: CPT

## 2020-01-01 PROCEDURE — 2580000003 HC RX 258: Performed by: EMERGENCY MEDICINE

## 2020-01-01 PROCEDURE — 20610 DRAIN/INJ JOINT/BURSA W/O US: CPT | Performed by: ORTHOPAEDIC SURGERY

## 2020-01-01 PROCEDURE — 83036 HEMOGLOBIN GLYCOSYLATED A1C: CPT

## 2020-01-01 PROCEDURE — 82330 ASSAY OF CALCIUM: CPT

## 2020-01-01 PROCEDURE — 6370000000 HC RX 637 (ALT 250 FOR IP): Performed by: UROLOGY

## 2020-01-01 PROCEDURE — 82947 ASSAY GLUCOSE BLOOD QUANT: CPT

## 2020-01-01 PROCEDURE — 93005 ELECTROCARDIOGRAM TRACING: CPT | Performed by: EMERGENCY MEDICINE

## 2020-01-01 PROCEDURE — 2720000010 HC SURG SUPPLY STERILE: Performed by: UROLOGY

## 2020-01-01 PROCEDURE — 83550 IRON BINDING TEST: CPT

## 2020-01-01 PROCEDURE — 0T768DZ DILATION OF RIGHT URETER WITH INTRALUMINAL DEVICE, VIA NATURAL OR ARTIFICIAL OPENING ENDOSCOPIC: ICD-10-PCS | Performed by: UROLOGY

## 2020-01-01 PROCEDURE — G8417 CALC BMI ABV UP PARAM F/U: HCPCS | Performed by: INTERNAL MEDICINE

## 2020-01-01 PROCEDURE — 74176 CT ABD & PELVIS W/O CONTRAST: CPT

## 2020-01-01 PROCEDURE — 2580000003 HC RX 258: Performed by: NURSE PRACTITIONER

## 2020-01-01 PROCEDURE — 3600000012 HC SURGERY LEVEL 2 ADDTL 15MIN: Performed by: UROLOGY

## 2020-01-01 PROCEDURE — 82306 VITAMIN D 25 HYDROXY: CPT

## 2020-01-01 PROCEDURE — 2500000003 HC RX 250 WO HCPCS: Performed by: NURSE ANESTHETIST, CERTIFIED REGISTERED

## 2020-01-01 PROCEDURE — 82565 ASSAY OF CREATININE: CPT

## 2020-01-01 PROCEDURE — 95811 POLYSOM 6/>YRS CPAP 4/> PARM: CPT

## 2020-01-01 PROCEDURE — 99214 OFFICE O/P EST MOD 30 MIN: CPT | Performed by: INTERNAL MEDICINE

## 2020-01-01 PROCEDURE — 88120 CYTP URNE 3-5 PROBES EA SPEC: CPT

## 2020-01-01 PROCEDURE — 1090F PRES/ABSN URINE INCON ASSESS: CPT | Performed by: INTERNAL MEDICINE

## 2020-01-01 PROCEDURE — G8427 DOCREV CUR MEDS BY ELIG CLIN: HCPCS | Performed by: ORTHOPAEDIC SURGERY

## 2020-01-01 PROCEDURE — 6370000000 HC RX 637 (ALT 250 FOR IP): Performed by: NURSE PRACTITIONER

## 2020-01-01 PROCEDURE — 81001 URINALYSIS AUTO W/SCOPE: CPT

## 2020-01-01 PROCEDURE — 2709999900 HC NON-CHARGEABLE SUPPLY: Performed by: UROLOGY

## 2020-01-01 PROCEDURE — 3017F COLORECTAL CA SCREEN DOC REV: CPT | Performed by: INTERNAL MEDICINE

## 2020-01-01 PROCEDURE — 82607 VITAMIN B-12: CPT

## 2020-01-01 PROCEDURE — 6360000004 HC RX CONTRAST MEDICATION: Performed by: UROLOGY

## 2020-01-01 PROCEDURE — 83735 ASSAY OF MAGNESIUM: CPT

## 2020-01-01 PROCEDURE — 6360000002 HC RX W HCPCS: Performed by: NURSE ANESTHETIST, CERTIFIED REGISTERED

## 2020-01-01 PROCEDURE — G0378 HOSPITAL OBSERVATION PER HR: HCPCS

## 2020-01-01 PROCEDURE — 85027 COMPLETE CBC AUTOMATED: CPT

## 2020-01-01 PROCEDURE — 1111F DSCHRG MED/CURRENT MED MERGE: CPT | Performed by: INTERNAL MEDICINE

## 2020-01-01 PROCEDURE — 6360000002 HC RX W HCPCS: Performed by: EMERGENCY MEDICINE

## 2020-01-01 PROCEDURE — G8484 FLU IMMUNIZE NO ADMIN: HCPCS | Performed by: ORTHOPAEDIC SURGERY

## 2020-01-01 PROCEDURE — 87070 CULTURE OTHR SPECIMN AEROBIC: CPT

## 2020-01-01 PROCEDURE — 96401 CHEMO ANTI-NEOPL SQ/IM: CPT

## 2020-01-01 PROCEDURE — 87205 SMEAR GRAM STAIN: CPT

## 2020-01-01 PROCEDURE — 0TJB8ZZ INSPECTION OF BLADDER, VIA NATURAL OR ARTIFICIAL OPENING ENDOSCOPIC: ICD-10-PCS | Performed by: UROLOGY

## 2020-01-01 PROCEDURE — 83970 ASSAY OF PARATHORMONE: CPT

## 2020-01-01 PROCEDURE — 97535 SELF CARE MNGMENT TRAINING: CPT

## 2020-01-01 PROCEDURE — 83605 ASSAY OF LACTIC ACID: CPT

## 2020-01-01 PROCEDURE — 92611 MOTION FLUOROSCOPY/SWALLOW: CPT

## 2020-01-01 PROCEDURE — 97163 PT EVAL HIGH COMPLEX 45 MIN: CPT

## 2020-01-01 PROCEDURE — 84484 ASSAY OF TROPONIN QUANT: CPT

## 2020-01-01 PROCEDURE — 3209999900 FLUORO FOR SURGICAL PROCEDURES

## 2020-01-01 PROCEDURE — G8484 FLU IMMUNIZE NO ADMIN: HCPCS | Performed by: INTERNAL MEDICINE

## 2020-01-01 PROCEDURE — C2617 STENT, NON-COR, TEM W/O DEL: HCPCS | Performed by: UROLOGY

## 2020-01-01 PROCEDURE — C1726 CATH, BAL DIL, NON-VASCULAR: HCPCS | Performed by: UROLOGY

## 2020-01-01 PROCEDURE — 7100000000 HC PACU RECOVERY - FIRST 15 MIN: Performed by: UROLOGY

## 2020-01-01 PROCEDURE — 4040F PNEUMOC VAC/ADMIN/RCVD: CPT | Performed by: ORTHOPAEDIC SURGERY

## 2020-01-01 PROCEDURE — 3700000001 HC ADD 15 MINUTES (ANESTHESIA): Performed by: UROLOGY

## 2020-01-01 PROCEDURE — 1123F ACP DISCUSS/DSCN MKR DOCD: CPT | Performed by: ORTHOPAEDIC SURGERY

## 2020-01-01 PROCEDURE — 99282 EMERGENCY DEPT VISIT SF MDM: CPT

## 2020-01-01 PROCEDURE — G8427 DOCREV CUR MEDS BY ELIG CLIN: HCPCS | Performed by: INTERNAL MEDICINE

## 2020-01-01 PROCEDURE — 7100000001 HC PACU RECOVERY - ADDTL 15 MIN: Performed by: UROLOGY

## 2020-01-01 PROCEDURE — 93010 ELECTROCARDIOGRAM REPORT: CPT | Performed by: INTERNAL MEDICINE

## 2020-01-01 PROCEDURE — 4040F PNEUMOC VAC/ADMIN/RCVD: CPT | Performed by: INTERNAL MEDICINE

## 2020-01-01 PROCEDURE — 96374 THER/PROPH/DIAG INJ IV PUSH: CPT

## 2020-01-01 PROCEDURE — 94760 N-INVAS EAR/PLS OXIMETRY 1: CPT

## 2020-01-01 PROCEDURE — 92526 ORAL FUNCTION THERAPY: CPT

## 2020-01-01 PROCEDURE — G8417 CALC BMI ABV UP PARAM F/U: HCPCS | Performed by: ORTHOPAEDIC SURGERY

## 2020-01-01 PROCEDURE — 6370000000 HC RX 637 (ALT 250 FOR IP): Performed by: EMERGENCY MEDICINE

## 2020-01-01 PROCEDURE — 99283 EMERGENCY DEPT VISIT LOW MDM: CPT

## 2020-01-01 PROCEDURE — 82746 ASSAY OF FOLIC ACID SERUM: CPT

## 2020-01-01 PROCEDURE — BT1D1ZZ FLUOROSCOPY OF RIGHT KIDNEY, URETER AND BLADDER USING LOW OSMOLAR CONTRAST: ICD-10-PCS | Performed by: UROLOGY

## 2020-01-01 PROCEDURE — 3600000002 HC SURGERY LEVEL 2 BASE: Performed by: UROLOGY

## 2020-01-01 PROCEDURE — C1758 CATHETER, URETERAL: HCPCS | Performed by: UROLOGY

## 2020-01-01 PROCEDURE — 1090F PRES/ABSN URINE INCON ASSESS: CPT | Performed by: ORTHOPAEDIC SURGERY

## 2020-01-01 PROCEDURE — 74230 X-RAY XM SWLNG FUNCJ C+: CPT

## 2020-01-01 PROCEDURE — 99285 EMERGENCY DEPT VISIT HI MDM: CPT

## 2020-01-01 PROCEDURE — 82310 ASSAY OF CALCIUM: CPT

## 2020-01-01 PROCEDURE — 92610 EVALUATE SWALLOWING FUNCTION: CPT

## 2020-01-01 PROCEDURE — 1123F ACP DISCUSS/DSCN MKR DOCD: CPT | Performed by: INTERNAL MEDICINE

## 2020-01-01 PROCEDURE — 82803 BLOOD GASES ANY COMBINATION: CPT

## 2020-01-01 PROCEDURE — G8400 PT W/DXA NO RESULTS DOC: HCPCS | Performed by: ORTHOPAEDIC SURGERY

## 2020-01-01 PROCEDURE — U0002 COVID-19 LAB TEST NON-CDC: HCPCS

## 2020-01-01 PROCEDURE — U0003 INFECTIOUS AGENT DETECTION BY NUCLEIC ACID (DNA OR RNA); SEVERE ACUTE RESPIRATORY SYNDROME CORONAVIRUS 2 (SARS-COV-2) (CORONAVIRUS DISEASE [COVID-19]), AMPLIFIED PROBE TECHNIQUE, MAKING USE OF HIGH THROUGHPUT TECHNOLOGIES AS DESCRIBED BY CMS-2020-01-R: HCPCS

## 2020-01-01 PROCEDURE — 3700000000 HC ANESTHESIA ATTENDED CARE: Performed by: UROLOGY

## 2020-01-01 PROCEDURE — C1769 GUIDE WIRE: HCPCS | Performed by: UROLOGY

## 2020-01-01 PROCEDURE — 99212 OFFICE O/P EST SF 10 MIN: CPT | Performed by: ORTHOPAEDIC SURGERY

## 2020-01-01 PROCEDURE — 36600 WITHDRAWAL OF ARTERIAL BLOOD: CPT

## 2020-01-01 DEVICE — URETERAL STENT
Type: IMPLANTABLE DEVICE | Site: URETER | Status: FUNCTIONAL
Brand: POLARIS™ ULTRA

## 2020-01-01 RX ORDER — ACETAMINOPHEN 325 MG/1
650 TABLET ORAL EVERY 6 HOURS PRN
Status: DISCONTINUED | OUTPATIENT
Start: 2020-01-01 | End: 2020-01-01 | Stop reason: HOSPADM

## 2020-01-01 RX ORDER — LIDOCAINE HYDROCHLORIDE 20 MG/ML
INJECTION, SOLUTION EPIDURAL; INFILTRATION; INTRACAUDAL; PERINEURAL PRN
Status: DISCONTINUED | OUTPATIENT
Start: 2020-01-01 | End: 2020-01-01 | Stop reason: SDUPTHER

## 2020-01-01 RX ORDER — ONDANSETRON 2 MG/ML
4 INJECTION INTRAMUSCULAR; INTRAVENOUS EVERY 6 HOURS PRN
Status: DISCONTINUED | OUTPATIENT
Start: 2020-01-01 | End: 2020-01-01 | Stop reason: HOSPADM

## 2020-01-01 RX ORDER — FUROSEMIDE 10 MG/ML
20 INJECTION INTRAMUSCULAR; INTRAVENOUS ONCE
Status: DISCONTINUED | OUTPATIENT
Start: 2020-01-01 | End: 2020-01-01

## 2020-01-01 RX ORDER — CYANOCOBALAMIN 1000 UG/ML
1000 INJECTION INTRAMUSCULAR; SUBCUTANEOUS ONCE
Status: CANCELLED
Start: 2020-01-01

## 2020-01-01 RX ORDER — PROMETHAZINE HYDROCHLORIDE 12.5 MG/1
12.5 TABLET ORAL EVERY 6 HOURS PRN
Status: DISCONTINUED | OUTPATIENT
Start: 2020-01-01 | End: 2020-01-01 | Stop reason: HOSPADM

## 2020-01-01 RX ORDER — 0.9 % SODIUM CHLORIDE 0.9 %
500 INTRAVENOUS SOLUTION INTRAVENOUS ONCE
Status: COMPLETED | OUTPATIENT
Start: 2020-01-01 | End: 2020-01-01

## 2020-01-01 RX ORDER — PANTOPRAZOLE SODIUM 40 MG/1
40 TABLET, DELAYED RELEASE ORAL
Status: DISCONTINUED | OUTPATIENT
Start: 2020-01-01 | End: 2020-01-01 | Stop reason: HOSPADM

## 2020-01-01 RX ORDER — FENTANYL CITRATE 50 UG/ML
INJECTION, SOLUTION INTRAMUSCULAR; INTRAVENOUS PRN
Status: DISCONTINUED | OUTPATIENT
Start: 2020-01-01 | End: 2020-01-01 | Stop reason: SDUPTHER

## 2020-01-01 RX ORDER — FENTANYL CITRATE 50 UG/ML
25 INJECTION, SOLUTION INTRAMUSCULAR; INTRAVENOUS EVERY 5 MIN PRN
Status: DISCONTINUED | OUTPATIENT
Start: 2020-01-01 | End: 2020-01-01 | Stop reason: HOSPADM

## 2020-01-01 RX ORDER — PREDNISONE 1 MG/1
10 TABLET ORAL
Qty: 6 TABLET | Refills: 0 | Status: SHIPPED | OUTPATIENT
Start: 2020-01-01 | End: 2020-01-01 | Stop reason: ALTCHOICE

## 2020-01-01 RX ORDER — CYANOCOBALAMIN 1000 UG/ML
1000 INJECTION INTRAMUSCULAR; SUBCUTANEOUS ONCE
Status: COMPLETED
Start: 2020-01-01 | End: 2020-01-01

## 2020-01-01 RX ORDER — RASAGILINE 0.5 MG/1
1 TABLET ORAL DAILY
Status: DISCONTINUED | OUTPATIENT
Start: 2020-01-01 | End: 2020-01-01 | Stop reason: HOSPADM

## 2020-01-01 RX ORDER — NEOSTIGMINE METHYLSULFATE 5 MG/5 ML
SYRINGE (ML) INTRAVENOUS PRN
Status: DISCONTINUED | OUTPATIENT
Start: 2020-01-01 | End: 2020-01-01 | Stop reason: SDUPTHER

## 2020-01-01 RX ORDER — METHYLPREDNISOLONE ACETATE 40 MG/ML
40 INJECTION, SUSPENSION INTRA-ARTICULAR; INTRALESIONAL; INTRAMUSCULAR; SOFT TISSUE ONCE
Status: COMPLETED | OUTPATIENT
Start: 2020-01-01 | End: 2020-01-01

## 2020-01-01 RX ORDER — ACETAMINOPHEN 650 MG/1
650 SUPPOSITORY RECTAL EVERY 6 HOURS PRN
Status: DISCONTINUED | OUTPATIENT
Start: 2020-01-01 | End: 2020-01-01 | Stop reason: HOSPADM

## 2020-01-01 RX ORDER — CIPROFLOXACIN 2 MG/ML
INJECTION, SOLUTION INTRAVENOUS PRN
Status: DISCONTINUED | OUTPATIENT
Start: 2020-01-01 | End: 2020-01-01 | Stop reason: SDUPTHER

## 2020-01-01 RX ORDER — CHOLECALCIFEROL (VITAMIN D3) 1250 MCG
CAPSULE ORAL
Status: ON HOLD | COMMUNITY
End: 2020-01-01 | Stop reason: HOSPADM

## 2020-01-01 RX ORDER — NICOTINE POLACRILEX 4 MG
15 LOZENGE BUCCAL PRN
Status: DISCONTINUED | OUTPATIENT
Start: 2020-01-01 | End: 2020-01-01 | Stop reason: HOSPADM

## 2020-01-01 RX ORDER — 0.9 % SODIUM CHLORIDE 0.9 %
1000 INTRAVENOUS SOLUTION INTRAVENOUS ONCE
Status: DISCONTINUED | OUTPATIENT
Start: 2020-01-01 | End: 2020-01-01

## 2020-01-01 RX ORDER — CARBIDOPA AND LEVODOPA 25; 100 MG/1; MG/1
1 TABLET, EXTENDED RELEASE ORAL 4 TIMES DAILY
Status: DISCONTINUED | OUTPATIENT
Start: 2020-01-01 | End: 2020-01-01 | Stop reason: HOSPADM

## 2020-01-01 RX ORDER — LIDOCAINE HYDROCHLORIDE 20 MG/ML
JELLY TOPICAL PRN
Status: DISCONTINUED | OUTPATIENT
Start: 2020-01-01 | End: 2020-01-01 | Stop reason: HOSPADM

## 2020-01-01 RX ORDER — METHYLPREDNISOLONE SODIUM SUCCINATE 40 MG/ML
40 INJECTION, POWDER, LYOPHILIZED, FOR SOLUTION INTRAMUSCULAR; INTRAVENOUS EVERY 8 HOURS
Status: DISCONTINUED | OUTPATIENT
Start: 2020-01-01 | End: 2020-01-01

## 2020-01-01 RX ORDER — ROCURONIUM BROMIDE 10 MG/ML
INJECTION, SOLUTION INTRAVENOUS PRN
Status: DISCONTINUED | OUTPATIENT
Start: 2020-01-01 | End: 2020-01-01 | Stop reason: SDUPTHER

## 2020-01-01 RX ORDER — PREDNISONE 20 MG/1
40 TABLET ORAL DAILY
Status: DISCONTINUED | OUTPATIENT
Start: 2020-01-01 | End: 2020-01-01 | Stop reason: HOSPADM

## 2020-01-01 RX ORDER — CALCIUM CARBONATE/VITAMIN D3 250-3.125
2 TABLET ORAL DAILY
Status: DISCONTINUED | OUTPATIENT
Start: 2020-01-01 | End: 2020-01-01 | Stop reason: HOSPADM

## 2020-01-01 RX ORDER — PROPOFOL 10 MG/ML
INJECTION, EMULSION INTRAVENOUS PRN
Status: DISCONTINUED | OUTPATIENT
Start: 2020-01-01 | End: 2020-01-01 | Stop reason: SDUPTHER

## 2020-01-01 RX ORDER — NITROFURANTOIN 25; 75 MG/1; MG/1
100 CAPSULE ORAL ONCE
Status: COMPLETED | OUTPATIENT
Start: 2020-01-01 | End: 2020-01-01

## 2020-01-01 RX ORDER — ISOSORBIDE MONONITRATE 30 MG/1
30 TABLET, EXTENDED RELEASE ORAL DAILY
Status: DISCONTINUED | OUTPATIENT
Start: 2020-01-01 | End: 2020-01-01 | Stop reason: HOSPADM

## 2020-01-01 RX ORDER — FUROSEMIDE 10 MG/ML
20 INJECTION INTRAMUSCULAR; INTRAVENOUS DAILY
Status: DISCONTINUED | OUTPATIENT
Start: 2020-01-01 | End: 2020-01-01

## 2020-01-01 RX ORDER — ALPRAZOLAM 0.25 MG/1
0.25 TABLET ORAL 2 TIMES DAILY PRN
Status: DISCONTINUED | OUTPATIENT
Start: 2020-01-01 | End: 2020-01-01 | Stop reason: HOSPADM

## 2020-01-01 RX ORDER — SODIUM CHLORIDE, SODIUM LACTATE, POTASSIUM CHLORIDE, CALCIUM CHLORIDE 600; 310; 30; 20 MG/100ML; MG/100ML; MG/100ML; MG/100ML
INJECTION, SOLUTION INTRAVENOUS CONTINUOUS PRN
Status: DISCONTINUED | OUTPATIENT
Start: 2020-01-01 | End: 2020-01-01 | Stop reason: SDUPTHER

## 2020-01-01 RX ORDER — PREDNISONE 20 MG/1
30 TABLET ORAL DAILY
Qty: 5 TABLET | Refills: 0 | Status: SHIPPED | OUTPATIENT
Start: 2020-01-01 | End: 2020-01-01

## 2020-01-01 RX ORDER — SODIUM CHLORIDE 0.9 % (FLUSH) 0.9 %
10 SYRINGE (ML) INJECTION PRN
Status: DISCONTINUED | OUTPATIENT
Start: 2020-01-01 | End: 2020-01-01 | Stop reason: HOSPADM

## 2020-01-01 RX ORDER — ONDANSETRON 2 MG/ML
4 INJECTION INTRAMUSCULAR; INTRAVENOUS
Status: DISCONTINUED | OUTPATIENT
Start: 2020-01-01 | End: 2020-01-01 | Stop reason: HOSPADM

## 2020-01-01 RX ORDER — LANOLIN ALCOHOL/MO/W.PET/CERES
3 CREAM (GRAM) TOPICAL NIGHTLY PRN
Status: DISCONTINUED | OUTPATIENT
Start: 2020-01-01 | End: 2020-01-01 | Stop reason: HOSPADM

## 2020-01-01 RX ORDER — ROPINIROLE 2 MG/1
2 TABLET, FILM COATED ORAL 3 TIMES DAILY
Status: DISCONTINUED | OUTPATIENT
Start: 2020-01-01 | End: 2020-01-01 | Stop reason: HOSPADM

## 2020-01-01 RX ORDER — LIDOCAINE HYDROCHLORIDE 10 MG/ML
5 INJECTION, SOLUTION INFILTRATION; PERINEURAL ONCE
Status: COMPLETED | OUTPATIENT
Start: 2020-01-01 | End: 2020-01-01

## 2020-01-01 RX ORDER — QUETIAPINE FUMARATE 50 MG/1
50 TABLET, EXTENDED RELEASE ORAL NIGHTLY
Status: DISCONTINUED | OUTPATIENT
Start: 2020-01-01 | End: 2020-01-01

## 2020-01-01 RX ORDER — QUETIAPINE FUMARATE 50 MG/1
50 TABLET, EXTENDED RELEASE ORAL NIGHTLY
COMMUNITY
End: 2021-01-01

## 2020-01-01 RX ORDER — CALCIUM CARBONATE/VITAMIN D3 250-3.125
2 TABLET ORAL DAILY
Qty: 30 TABLET | Refills: 0 | Status: SHIPPED | OUTPATIENT
Start: 2020-01-01

## 2020-01-01 RX ORDER — ONDANSETRON 2 MG/ML
INJECTION INTRAMUSCULAR; INTRAVENOUS PRN
Status: DISCONTINUED | OUTPATIENT
Start: 2020-01-01 | End: 2020-01-01 | Stop reason: SDUPTHER

## 2020-01-01 RX ORDER — CALCITRIOL 0.25 UG/1
0.75 CAPSULE, LIQUID FILLED ORAL DAILY
Status: DISCONTINUED | OUTPATIENT
Start: 2020-01-01 | End: 2020-01-01

## 2020-01-01 RX ORDER — FUROSEMIDE 10 MG/ML
20 INJECTION INTRAMUSCULAR; INTRAVENOUS 2 TIMES DAILY
Status: DISCONTINUED | OUTPATIENT
Start: 2020-01-01 | End: 2020-01-01 | Stop reason: HOSPADM

## 2020-01-01 RX ORDER — FUROSEMIDE 20 MG/1
20 TABLET ORAL DAILY
Status: DISCONTINUED | OUTPATIENT
Start: 2020-01-01 | End: 2020-01-01

## 2020-01-01 RX ORDER — MODAFINIL 200 MG/1
100 TABLET ORAL DAILY
Status: DISCONTINUED | OUTPATIENT
Start: 2020-01-01 | End: 2020-01-01 | Stop reason: HOSPADM

## 2020-01-01 RX ORDER — PREDNISONE 10 MG/1
10 TABLET ORAL DAILY
Qty: 3 TABLET | Refills: 0 | Status: SHIPPED | OUTPATIENT
Start: 2020-01-01 | End: 2020-01-01 | Stop reason: ALTCHOICE

## 2020-01-01 RX ORDER — CYANOCOBALAMIN 1000 UG/ML
1000 INJECTION INTRAMUSCULAR; SUBCUTANEOUS ONCE
Status: CANCELLED
Start: 2021-01-01

## 2020-01-01 RX ORDER — DEXTROSE MONOHYDRATE 50 MG/ML
100 INJECTION, SOLUTION INTRAVENOUS PRN
Status: DISCONTINUED | OUTPATIENT
Start: 2020-01-01 | End: 2020-01-01 | Stop reason: HOSPADM

## 2020-01-01 RX ORDER — METHYLPREDNISOLONE SODIUM SUCCINATE 125 MG/2ML
125 INJECTION, POWDER, LYOPHILIZED, FOR SOLUTION INTRAMUSCULAR; INTRAVENOUS ONCE
Status: COMPLETED | OUTPATIENT
Start: 2020-01-01 | End: 2020-01-01

## 2020-01-01 RX ORDER — ALBUTEROL SULFATE 2.5 MG/3ML
2.5 SOLUTION RESPIRATORY (INHALATION) 4 TIMES DAILY
Status: DISCONTINUED | OUTPATIENT
Start: 2020-01-01 | End: 2020-01-01 | Stop reason: SDUPTHER

## 2020-01-01 RX ORDER — BUDESONIDE AND FORMOTEROL FUMARATE DIHYDRATE 160; 4.5 UG/1; UG/1
2 AEROSOL RESPIRATORY (INHALATION) 2 TIMES DAILY
Status: DISCONTINUED | OUTPATIENT
Start: 2020-01-01 | End: 2020-01-01 | Stop reason: HOSPADM

## 2020-01-01 RX ORDER — ALBUTEROL SULFATE 2.5 MG/3ML
2.5 SOLUTION RESPIRATORY (INHALATION) 4 TIMES DAILY
Status: DISCONTINUED | OUTPATIENT
Start: 2020-01-01 | End: 2020-01-01 | Stop reason: HOSPADM

## 2020-01-01 RX ORDER — NITROFURANTOIN 25; 75 MG/1; MG/1
100 CAPSULE ORAL 2 TIMES DAILY
Qty: 6 CAPSULE | Refills: 0 | Status: SHIPPED | OUTPATIENT
Start: 2020-01-01 | End: 2020-01-01

## 2020-01-01 RX ORDER — METHYLPREDNISOLONE SODIUM SUCCINATE 40 MG/ML
40 INJECTION, POWDER, LYOPHILIZED, FOR SOLUTION INTRAMUSCULAR; INTRAVENOUS EVERY 12 HOURS
Status: DISCONTINUED | OUTPATIENT
Start: 2020-01-01 | End: 2020-01-01

## 2020-01-01 RX ORDER — SODIUM CHLORIDE 0.9 % (FLUSH) 0.9 %
10 SYRINGE (ML) INJECTION EVERY 12 HOURS SCHEDULED
Status: DISCONTINUED | OUTPATIENT
Start: 2020-01-01 | End: 2020-01-01 | Stop reason: HOSPADM

## 2020-01-01 RX ORDER — ALBUTEROL SULFATE 2.5 MG/3ML
2.5 SOLUTION RESPIRATORY (INHALATION)
Status: DISCONTINUED | OUTPATIENT
Start: 2020-01-01 | End: 2020-01-01

## 2020-01-01 RX ORDER — PREDNISONE 10 MG/1
20 TABLET ORAL DAILY
Qty: 6 TABLET | Refills: 0 | Status: SHIPPED | OUTPATIENT
Start: 2020-01-01 | End: 2020-01-01

## 2020-01-01 RX ORDER — DEXTROSE MONOHYDRATE 25 G/50ML
12.5 INJECTION, SOLUTION INTRAVENOUS PRN
Status: DISCONTINUED | OUTPATIENT
Start: 2020-01-01 | End: 2020-01-01 | Stop reason: HOSPADM

## 2020-01-01 RX ORDER — ALBUTEROL SULFATE 2.5 MG/3ML
2.5 SOLUTION RESPIRATORY (INHALATION) EVERY 4 HOURS PRN
Status: DISCONTINUED | OUTPATIENT
Start: 2020-01-01 | End: 2020-01-01 | Stop reason: HOSPADM

## 2020-01-01 RX ORDER — POLYETHYLENE GLYCOL 3350 17 G/17G
17 POWDER, FOR SOLUTION ORAL DAILY PRN
Qty: 527 G | Refills: 0 | Status: SHIPPED | OUTPATIENT
Start: 2020-01-01 | End: 2020-01-01

## 2020-01-01 RX ORDER — GLYCOPYRROLATE 1 MG/5 ML
SYRINGE (ML) INTRAVENOUS PRN
Status: DISCONTINUED | OUTPATIENT
Start: 2020-01-01 | End: 2020-01-01 | Stop reason: SDUPTHER

## 2020-01-01 RX ORDER — POLYETHYLENE GLYCOL 3350 17 G/17G
17 POWDER, FOR SOLUTION ORAL DAILY PRN
Status: DISCONTINUED | OUTPATIENT
Start: 2020-01-01 | End: 2020-01-01 | Stop reason: HOSPADM

## 2020-01-01 RX ORDER — ATORVASTATIN CALCIUM 20 MG/1
20 TABLET, FILM COATED ORAL DAILY
Status: DISCONTINUED | OUTPATIENT
Start: 2020-01-01 | End: 2020-01-01 | Stop reason: HOSPADM

## 2020-01-01 RX ORDER — NALOXONE HYDROCHLORIDE 0.4 MG/ML
INJECTION, SOLUTION INTRAMUSCULAR; INTRAVENOUS; SUBCUTANEOUS PRN
Status: DISCONTINUED | OUTPATIENT
Start: 2020-01-01 | End: 2020-01-01 | Stop reason: SDUPTHER

## 2020-01-01 RX ORDER — AMIODARONE HYDROCHLORIDE 200 MG/1
200 TABLET ORAL DAILY
Status: DISCONTINUED | OUTPATIENT
Start: 2020-01-01 | End: 2020-01-01 | Stop reason: HOSPADM

## 2020-01-01 RX ORDER — MAGNESIUM SULFATE 1 G/100ML
1 INJECTION INTRAVENOUS ONCE
Status: COMPLETED | OUTPATIENT
Start: 2020-01-01 | End: 2020-01-01

## 2020-01-01 RX ORDER — SODIUM CHLORIDE 9 MG/ML
INJECTION, SOLUTION INTRAVENOUS CONTINUOUS
Status: DISCONTINUED | OUTPATIENT
Start: 2020-01-01 | End: 2020-01-01

## 2020-01-01 RX ORDER — MIDAZOLAM HYDROCHLORIDE 1 MG/ML
INJECTION INTRAMUSCULAR; INTRAVENOUS PRN
Status: DISCONTINUED | OUTPATIENT
Start: 2020-01-01 | End: 2020-01-01 | Stop reason: SDUPTHER

## 2020-01-01 RX ADMIN — CARBIDOPA AND LEVODOPA 1 TABLET: 25; 100 TABLET, EXTENDED RELEASE ORAL at 17:29

## 2020-01-01 RX ADMIN — SODIUM CHLORIDE, PRESERVATIVE FREE 10 ML: 5 INJECTION INTRAVENOUS at 21:49

## 2020-01-01 RX ADMIN — INSULIN LISPRO 3 UNITS: 100 INJECTION, SOLUTION INTRAVENOUS; SUBCUTANEOUS at 12:59

## 2020-01-01 RX ADMIN — APIXABAN 2.5 MG: 2.5 TABLET, FILM COATED ORAL at 20:01

## 2020-01-01 RX ADMIN — ALBUTEROL SULFATE 2.5 MG: 2.5 SOLUTION RESPIRATORY (INHALATION) at 14:38

## 2020-01-01 RX ADMIN — INSULIN LISPRO 2 UNITS: 100 INJECTION, SOLUTION INTRAVENOUS; SUBCUTANEOUS at 19:47

## 2020-01-01 RX ADMIN — ALBUTEROL SULFATE 2.5 MG: 2.5 SOLUTION RESPIRATORY (INHALATION) at 06:11

## 2020-01-01 RX ADMIN — METOPROLOL TARTRATE 25 MG: 25 TABLET, FILM COATED ORAL at 09:05

## 2020-01-01 RX ADMIN — SENNOSIDES 17.2 MG: 8.6 TABLET, FILM COATED ORAL at 20:13

## 2020-01-01 RX ADMIN — ROPINIROLE HYDROCHLORIDE 2 MG: 2 TABLET, FILM COATED ORAL at 09:27

## 2020-01-01 RX ADMIN — FUROSEMIDE 20 MG: 10 INJECTION, SOLUTION INTRAMUSCULAR; INTRAVENOUS at 12:19

## 2020-01-01 RX ADMIN — ROPINIROLE HYDROCHLORIDE 2 MG: 2 TABLET, FILM COATED ORAL at 13:10

## 2020-01-01 RX ADMIN — FUROSEMIDE 20 MG: 20 TABLET ORAL at 09:28

## 2020-01-01 RX ADMIN — CARBIDOPA AND LEVODOPA 1 TABLET: 25; 100 TABLET, EXTENDED RELEASE ORAL at 17:26

## 2020-01-01 RX ADMIN — ISOSORBIDE MONONITRATE 30 MG: 30 TABLET ORAL at 09:24

## 2020-01-01 RX ADMIN — INSULIN LISPRO 1 UNITS: 100 INJECTION, SOLUTION INTRAVENOUS; SUBCUTANEOUS at 17:26

## 2020-01-01 RX ADMIN — SODIUM CHLORIDE, PRESERVATIVE FREE 10 ML: 5 INJECTION INTRAVENOUS at 08:44

## 2020-01-01 RX ADMIN — NITROFURANTOIN (MONOHYDRATE/MACROCRYSTALS) 100 MG: 75; 25 CAPSULE ORAL at 04:27

## 2020-01-01 RX ADMIN — Medication 3 MG: at 10:39

## 2020-01-01 RX ADMIN — RASAGILINE 1 MG: 0.5 TABLET ORAL at 08:54

## 2020-01-01 RX ADMIN — ALPRAZOLAM 0.25 MG: 0.25 TABLET ORAL at 22:26

## 2020-01-01 RX ADMIN — APIXABAN 2.5 MG: 2.5 TABLET, FILM COATED ORAL at 20:32

## 2020-01-01 RX ADMIN — METOPROLOL TARTRATE 25 MG: 25 TABLET ORAL at 09:27

## 2020-01-01 RX ADMIN — CARBIDOPA AND LEVODOPA 1 TABLET: 25; 100 TABLET, EXTENDED RELEASE ORAL at 17:46

## 2020-01-01 RX ADMIN — ALBUTEROL SULFATE 2.5 MG: 2.5 SOLUTION RESPIRATORY (INHALATION) at 14:30

## 2020-01-01 RX ADMIN — TIOTROPIUM BROMIDE INHALATION SPRAY 2 PUFF: 3.12 SPRAY, METERED RESPIRATORY (INHALATION) at 07:42

## 2020-01-01 RX ADMIN — FUROSEMIDE 20 MG: 20 TABLET ORAL at 09:05

## 2020-01-01 RX ADMIN — CALCITRIOL 0.75 MCG: 0.25 CAPSULE ORAL at 09:05

## 2020-01-01 RX ADMIN — ROPINIROLE HYDROCHLORIDE 2 MG: 2 TABLET, FILM COATED ORAL at 08:42

## 2020-01-01 RX ADMIN — AZITHROMYCIN MONOHYDRATE 500 MG: 500 INJECTION, POWDER, LYOPHILIZED, FOR SOLUTION INTRAVENOUS at 21:32

## 2020-01-01 RX ADMIN — INSULIN LISPRO 2 UNITS: 100 INJECTION, SOLUTION INTRAVENOUS; SUBCUTANEOUS at 12:02

## 2020-01-01 RX ADMIN — ALBUTEROL SULFATE 2.5 MG: 2.5 SOLUTION RESPIRATORY (INHALATION) at 15:00

## 2020-01-01 RX ADMIN — APIXABAN 2.5 MG: 2.5 TABLET, FILM COATED ORAL at 08:42

## 2020-01-01 RX ADMIN — LINAGLIPTIN 2.5 MG: 5 TABLET, FILM COATED ORAL at 09:27

## 2020-01-01 RX ADMIN — AMIODARONE HYDROCHLORIDE 200 MG: 200 TABLET ORAL at 11:42

## 2020-01-01 RX ADMIN — ALBUTEROL SULFATE 2.5 MG: 2.5 SOLUTION RESPIRATORY (INHALATION) at 19:40

## 2020-01-01 RX ADMIN — ONDANSETRON 4 MG: 2 INJECTION INTRAMUSCULAR; INTRAVENOUS at 10:47

## 2020-01-01 RX ADMIN — INSULIN LISPRO 1 UNITS: 100 INJECTION, SOLUTION INTRAVENOUS; SUBCUTANEOUS at 08:44

## 2020-01-01 RX ADMIN — FUROSEMIDE 20 MG: 10 INJECTION, SOLUTION INTRAMUSCULAR; INTRAVENOUS at 08:51

## 2020-01-01 RX ADMIN — ALBUTEROL SULFATE 2.5 MG: 2.5 SOLUTION RESPIRATORY (INHALATION) at 07:47

## 2020-01-01 RX ADMIN — ACETAMINOPHEN 650 MG: 325 TABLET ORAL at 08:54

## 2020-01-01 RX ADMIN — METHYLPREDNISOLONE SODIUM SUCCINATE 40 MG: 40 INJECTION, POWDER, FOR SOLUTION INTRAMUSCULAR; INTRAVENOUS at 21:46

## 2020-01-01 RX ADMIN — ATORVASTATIN CALCIUM 20 MG: 20 TABLET, FILM COATED ORAL at 12:15

## 2020-01-01 RX ADMIN — CIPROFLOXACIN 400 MG: 2 INJECTION, SOLUTION INTRAVENOUS at 09:55

## 2020-01-01 RX ADMIN — ALBUTEROL SULFATE 2.5 MG: 2.5 SOLUTION RESPIRATORY (INHALATION) at 17:12

## 2020-01-01 RX ADMIN — ATORVASTATIN CALCIUM 20 MG: 20 TABLET, FILM COATED ORAL at 08:54

## 2020-01-01 RX ADMIN — CYANOCOBALAMIN 1000 MCG: 1000 INJECTION, SOLUTION INTRAMUSCULAR at 14:43

## 2020-01-01 RX ADMIN — ISOSORBIDE MONONITRATE 30 MG: 30 TABLET ORAL at 11:42

## 2020-01-01 RX ADMIN — METHYLPREDNISOLONE SODIUM SUCCINATE 40 MG: 40 INJECTION, POWDER, FOR SOLUTION INTRAMUSCULAR; INTRAVENOUS at 12:11

## 2020-01-01 RX ADMIN — METHYLPREDNISOLONE SODIUM SUCCINATE 40 MG: 40 INJECTION, POWDER, FOR SOLUTION INTRAMUSCULAR; INTRAVENOUS at 11:57

## 2020-01-01 RX ADMIN — ALBUTEROL SULFATE 2.5 MG: 2.5 SOLUTION RESPIRATORY (INHALATION) at 20:22

## 2020-01-01 RX ADMIN — CYANOCOBALAMIN 1000 MCG: 1000 INJECTION, SOLUTION INTRAMUSCULAR at 14:32

## 2020-01-01 RX ADMIN — ALPRAZOLAM 0.25 MG: 0.25 TABLET ORAL at 23:33

## 2020-01-01 RX ADMIN — CARBIDOPA AND LEVODOPA 1 TABLET: 25; 100 TABLET, EXTENDED RELEASE ORAL at 13:04

## 2020-01-01 RX ADMIN — ROCURONIUM BROMIDE 30 MG: 10 INJECTION, SOLUTION INTRAVENOUS at 09:42

## 2020-01-01 RX ADMIN — METOPROLOL TARTRATE 25 MG: 25 TABLET, FILM COATED ORAL at 08:43

## 2020-01-01 RX ADMIN — TIOTROPIUM BROMIDE INHALATION SPRAY 2 PUFF: 3.12 SPRAY, METERED RESPIRATORY (INHALATION) at 07:32

## 2020-01-01 RX ADMIN — ATORVASTATIN CALCIUM 20 MG: 20 TABLET, FILM COATED ORAL at 11:41

## 2020-01-01 RX ADMIN — AMIODARONE HYDROCHLORIDE 200 MG: 200 TABLET ORAL at 08:57

## 2020-01-01 RX ADMIN — RASAGILINE 1 MG: 0.5 TABLET ORAL at 08:43

## 2020-01-01 RX ADMIN — CARBIDOPA AND LEVODOPA 1 TABLET: 25; 100 TABLET, EXTENDED RELEASE ORAL at 09:18

## 2020-01-01 RX ADMIN — ISOSORBIDE MONONITRATE 30 MG: 30 TABLET ORAL at 09:27

## 2020-01-01 RX ADMIN — CYANOCOBALAMIN 1000 MCG: 1000 INJECTION, SOLUTION INTRAMUSCULAR at 15:35

## 2020-01-01 RX ADMIN — APIXABAN 2.5 MG: 2.5 TABLET, FILM COATED ORAL at 20:15

## 2020-01-01 RX ADMIN — ACETAMINOPHEN AND CODEINE PHOSPHATE 1 TABLET: 300; 30 TABLET ORAL at 06:34

## 2020-01-01 RX ADMIN — ISOSORBIDE MONONITRATE 30 MG: 30 TABLET ORAL at 08:54

## 2020-01-01 RX ADMIN — TIOTROPIUM BROMIDE INHALATION SPRAY 2 PUFF: 3.12 SPRAY, METERED RESPIRATORY (INHALATION) at 07:56

## 2020-01-01 RX ADMIN — AZITHROMYCIN MONOHYDRATE 500 MG: 500 INJECTION, POWDER, LYOPHILIZED, FOR SOLUTION INTRAVENOUS at 21:48

## 2020-01-01 RX ADMIN — CALCITRIOL 0.75 MCG: 0.25 CAPSULE ORAL at 12:14

## 2020-01-01 RX ADMIN — RASAGILINE 1 MG: 0.5 TABLET ORAL at 08:42

## 2020-01-01 RX ADMIN — INSULIN LISPRO 2 UNITS: 100 INJECTION, SOLUTION INTRAVENOUS; SUBCUTANEOUS at 20:31

## 2020-01-01 RX ADMIN — PANTOPRAZOLE SODIUM 40 MG: 40 TABLET, DELAYED RELEASE ORAL at 09:23

## 2020-01-01 RX ADMIN — AMIODARONE HYDROCHLORIDE 200 MG: 200 TABLET ORAL at 09:05

## 2020-01-01 RX ADMIN — ATORVASTATIN CALCIUM 20 MG: 20 TABLET, FILM COATED ORAL at 09:05

## 2020-01-01 RX ADMIN — CARBIDOPA AND LEVODOPA 1 TABLET: 25; 100 TABLET, EXTENDED RELEASE ORAL at 12:11

## 2020-01-01 RX ADMIN — METHYLPREDNISOLONE SODIUM SUCCINATE 40 MG: 40 INJECTION, POWDER, FOR SOLUTION INTRAMUSCULAR; INTRAVENOUS at 17:26

## 2020-01-01 RX ADMIN — CARBIDOPA AND LEVODOPA 1 TABLET: 25; 100 TABLET, EXTENDED RELEASE ORAL at 18:07

## 2020-01-01 RX ADMIN — INSULIN LISPRO 1 UNITS: 100 INJECTION, SOLUTION INTRAVENOUS; SUBCUTANEOUS at 12:16

## 2020-01-01 RX ADMIN — INSULIN LISPRO 1 UNITS: 100 INJECTION, SOLUTION INTRAVENOUS; SUBCUTANEOUS at 12:41

## 2020-01-01 RX ADMIN — BUDESONIDE AND FORMOTEROL FUMARATE DIHYDRATE 2 PUFF: 160; 4.5 AEROSOL RESPIRATORY (INHALATION) at 07:55

## 2020-01-01 RX ADMIN — ALBUTEROL SULFATE 2.5 MG: 2.5 SOLUTION RESPIRATORY (INHALATION) at 11:19

## 2020-01-01 RX ADMIN — CARBIDOPA AND LEVODOPA 1 TABLET: 25; 100 TABLET, EXTENDED RELEASE ORAL at 18:05

## 2020-01-01 RX ADMIN — SODIUM CHLORIDE, PRESERVATIVE FREE 10 ML: 5 INJECTION INTRAVENOUS at 20:01

## 2020-01-01 RX ADMIN — PANTOPRAZOLE SODIUM 40 MG: 40 TABLET, DELAYED RELEASE ORAL at 09:06

## 2020-01-01 RX ADMIN — ONDANSETRON 4 MG: 2 INJECTION INTRAMUSCULAR; INTRAVENOUS at 22:16

## 2020-01-01 RX ADMIN — BUDESONIDE AND FORMOTEROL FUMARATE DIHYDRATE 2 PUFF: 160; 4.5 AEROSOL RESPIRATORY (INHALATION) at 19:48

## 2020-01-01 RX ADMIN — ROPINIROLE HYDROCHLORIDE 2 MG: 2 TABLET, FILM COATED ORAL at 20:13

## 2020-01-01 RX ADMIN — CARBIDOPA AND LEVODOPA 1 TABLET: 25; 100 TABLET, EXTENDED RELEASE ORAL at 13:06

## 2020-01-01 RX ADMIN — BUDESONIDE AND FORMOTEROL FUMARATE DIHYDRATE 2 PUFF: 160; 4.5 AEROSOL RESPIRATORY (INHALATION) at 18:18

## 2020-01-01 RX ADMIN — TIOTROPIUM BROMIDE INHALATION SPRAY 2 PUFF: 3.12 SPRAY, METERED RESPIRATORY (INHALATION) at 07:52

## 2020-01-01 RX ADMIN — CARBIDOPA AND LEVODOPA 1 TABLET: 25; 100 TABLET, EXTENDED RELEASE ORAL at 23:42

## 2020-01-01 RX ADMIN — CYANOCOBALAMIN 1000 MCG: 1000 INJECTION, SOLUTION INTRAMUSCULAR at 14:53

## 2020-01-01 RX ADMIN — INSULIN LISPRO 2 UNITS: 100 INJECTION, SOLUTION INTRAVENOUS; SUBCUTANEOUS at 12:59

## 2020-01-01 RX ADMIN — INSULIN LISPRO 2 UNITS: 100 INJECTION, SOLUTION INTRAVENOUS; SUBCUTANEOUS at 17:32

## 2020-01-01 RX ADMIN — FUROSEMIDE 20 MG: 10 INJECTION, SOLUTION INTRAMUSCULAR; INTRAVENOUS at 08:43

## 2020-01-01 RX ADMIN — AMIODARONE HYDROCHLORIDE 200 MG: 200 TABLET ORAL at 09:27

## 2020-01-01 RX ADMIN — SODIUM CHLORIDE, PRESERVATIVE FREE 10 ML: 5 INJECTION INTRAVENOUS at 09:11

## 2020-01-01 RX ADMIN — BUDESONIDE AND FORMOTEROL FUMARATE DIHYDRATE 2 PUFF: 160; 4.5 AEROSOL RESPIRATORY (INHALATION) at 06:06

## 2020-01-01 RX ADMIN — CYANOCOBALAMIN 1000 MCG: 1000 INJECTION, SOLUTION INTRAMUSCULAR at 11:18

## 2020-01-01 RX ADMIN — PANTOPRAZOLE SODIUM 40 MG: 40 TABLET, DELAYED RELEASE ORAL at 06:34

## 2020-01-01 RX ADMIN — ALBUTEROL SULFATE 2.5 MG: 2.5 SOLUTION RESPIRATORY (INHALATION) at 07:32

## 2020-01-01 RX ADMIN — ROPINIROLE HYDROCHLORIDE 2 MG: 2 TABLET, FILM COATED ORAL at 09:23

## 2020-01-01 RX ADMIN — ISOSORBIDE MONONITRATE 30 MG: 30 TABLET ORAL at 08:43

## 2020-01-01 RX ADMIN — ALBUTEROL SULFATE 2.5 MG: 2.5 SOLUTION RESPIRATORY (INHALATION) at 18:18

## 2020-01-01 RX ADMIN — BUDESONIDE AND FORMOTEROL FUMARATE DIHYDRATE 2 PUFF: 160; 4.5 AEROSOL RESPIRATORY (INHALATION) at 20:21

## 2020-01-01 RX ADMIN — SODIUM CHLORIDE: 9 INJECTION, SOLUTION INTRAVENOUS at 21:32

## 2020-01-01 RX ADMIN — SODIUM CHLORIDE, PRESERVATIVE FREE 10 ML: 5 INJECTION INTRAVENOUS at 20:48

## 2020-01-01 RX ADMIN — SODIUM CHLORIDE: 9 INJECTION, SOLUTION INTRAVENOUS at 05:29

## 2020-01-01 RX ADMIN — CALCITRIOL 0.25 MCG: 0.25 CAPSULE ORAL at 09:27

## 2020-01-01 RX ADMIN — TIOTROPIUM BROMIDE INHALATION SPRAY 2 PUFF: 3.12 SPRAY, METERED RESPIRATORY (INHALATION) at 05:58

## 2020-01-01 RX ADMIN — INSULIN LISPRO 1 UNITS: 100 INJECTION, SOLUTION INTRAVENOUS; SUBCUTANEOUS at 17:48

## 2020-01-01 RX ADMIN — FUROSEMIDE 20 MG: 10 INJECTION, SOLUTION INTRAMUSCULAR; INTRAVENOUS at 09:10

## 2020-01-01 RX ADMIN — ROPINIROLE HYDROCHLORIDE 2 MG: 2 TABLET, FILM COATED ORAL at 08:43

## 2020-01-01 RX ADMIN — APIXABAN 5 MG: 5 TABLET, FILM COATED ORAL at 23:42

## 2020-01-01 RX ADMIN — ALBUTEROL SULFATE 2.5 MG: 2.5 SOLUTION RESPIRATORY (INHALATION) at 06:06

## 2020-01-01 RX ADMIN — ROPINIROLE HYDROCHLORIDE 2 MG: 2 TABLET, FILM COATED ORAL at 14:09

## 2020-01-01 RX ADMIN — DARBEPOETIN ALFA 200 MCG: 200 INJECTION, SOLUTION INTRAVENOUS; SUBCUTANEOUS at 14:43

## 2020-01-01 RX ADMIN — CYANOCOBALAMIN 1000 MCG: 1000 INJECTION, SOLUTION INTRAMUSCULAR at 15:18

## 2020-01-01 RX ADMIN — CALCIUM ACETATE 667 MG: 667 CAPSULE ORAL at 17:46

## 2020-01-01 RX ADMIN — APIXABAN 5 MG: 5 TABLET, FILM COATED ORAL at 19:52

## 2020-01-01 RX ADMIN — SODIUM CHLORIDE: 9 INJECTION, SOLUTION INTRAVENOUS at 03:25

## 2020-01-01 RX ADMIN — CARBIDOPA AND LEVODOPA 1 TABLET: 25; 100 TABLET, EXTENDED RELEASE ORAL at 19:47

## 2020-01-01 RX ADMIN — RASAGILINE 1 MG: 0.5 TABLET ORAL at 09:27

## 2020-01-01 RX ADMIN — CARBIDOPA AND LEVODOPA 1 TABLET: 25; 100 TABLET, EXTENDED RELEASE ORAL at 20:48

## 2020-01-01 RX ADMIN — MELATONIN TAB 3 MG 3 MG: 3 TAB at 02:42

## 2020-01-01 RX ADMIN — FUROSEMIDE 20 MG: 10 INJECTION, SOLUTION INTRAMUSCULAR; INTRAVENOUS at 18:07

## 2020-01-01 RX ADMIN — AZITHROMYCIN MONOHYDRATE 500 MG: 500 INJECTION, POWDER, LYOPHILIZED, FOR SOLUTION INTRAVENOUS at 23:41

## 2020-01-01 RX ADMIN — ALPRAZOLAM 0.25 MG: 0.25 TABLET ORAL at 08:59

## 2020-01-01 RX ADMIN — ALPRAZOLAM 0.25 MG: 0.25 TABLET ORAL at 23:30

## 2020-01-01 RX ADMIN — MAGNESIUM SULFATE HEPTAHYDRATE 1 G: 1 INJECTION, SOLUTION INTRAVENOUS at 11:42

## 2020-01-01 RX ADMIN — SODIUM CHLORIDE 500 ML: 0.9 INJECTION, SOLUTION INTRAVENOUS at 00:42

## 2020-01-01 RX ADMIN — ROPINIROLE HYDROCHLORIDE 2 MG: 2 TABLET, FILM COATED ORAL at 20:30

## 2020-01-01 RX ADMIN — ALPRAZOLAM 0.25 MG: 0.25 TABLET ORAL at 12:59

## 2020-01-01 RX ADMIN — ALBUTEROL SULFATE 2.5 MG: 2.5 SOLUTION RESPIRATORY (INHALATION) at 10:43

## 2020-01-01 RX ADMIN — DARBEPOETIN ALFA 200 MCG: 200 INJECTION, SOLUTION INTRAVENOUS; SUBCUTANEOUS at 11:47

## 2020-01-01 RX ADMIN — MELATONIN TAB 3 MG 3 MG: 3 TAB at 23:33

## 2020-01-01 RX ADMIN — ALBUTEROL SULFATE 2.5 MG: 2.5 SOLUTION RESPIRATORY (INHALATION) at 14:44

## 2020-01-01 RX ADMIN — BUDESONIDE AND FORMOTEROL FUMARATE DIHYDRATE 2 PUFF: 160; 4.5 AEROSOL RESPIRATORY (INHALATION) at 07:42

## 2020-01-01 RX ADMIN — ACETAMINOPHEN 650 MG: 325 TABLET ORAL at 23:30

## 2020-01-01 RX ADMIN — ONDANSETRON 4 MG: 2 INJECTION INTRAMUSCULAR; INTRAVENOUS at 22:14

## 2020-01-01 RX ADMIN — APIXABAN 2.5 MG: 2.5 TABLET, FILM COATED ORAL at 08:43

## 2020-01-01 RX ADMIN — CARBIDOPA AND LEVODOPA 1 TABLET: 25; 100 TABLET, EXTENDED RELEASE ORAL at 12:37

## 2020-01-01 RX ADMIN — PANTOPRAZOLE SODIUM 40 MG: 40 TABLET, DELAYED RELEASE ORAL at 09:10

## 2020-01-01 RX ADMIN — DARBEPOETIN ALFA 200 MCG: 200 INJECTION, SOLUTION INTRAVENOUS; SUBCUTANEOUS at 15:35

## 2020-01-01 RX ADMIN — INSULIN LISPRO 1 UNITS: 100 INJECTION, SOLUTION INTRAVENOUS; SUBCUTANEOUS at 20:39

## 2020-01-01 RX ADMIN — METHYLPREDNISOLONE SODIUM SUCCINATE 40 MG: 40 INJECTION, POWDER, FOR SOLUTION INTRAMUSCULAR; INTRAVENOUS at 21:47

## 2020-01-01 RX ADMIN — ALBUTEROL SULFATE 2.5 MG: 2.5 SOLUTION RESPIRATORY (INHALATION) at 14:16

## 2020-01-01 RX ADMIN — SODIUM CHLORIDE: 9 INJECTION, SOLUTION INTRAVENOUS at 02:51

## 2020-01-01 RX ADMIN — ROPINIROLE HYDROCHLORIDE 2 MG: 2 TABLET, FILM COATED ORAL at 21:48

## 2020-01-01 RX ADMIN — INSULIN LISPRO 3 UNITS: 100 INJECTION, SOLUTION INTRAVENOUS; SUBCUTANEOUS at 17:41

## 2020-01-01 RX ADMIN — CARBIDOPA AND LEVODOPA 1 TABLET: 25; 100 TABLET, EXTENDED RELEASE ORAL at 13:09

## 2020-01-01 RX ADMIN — ALPRAZOLAM 0.25 MG: 0.25 TABLET ORAL at 21:46

## 2020-01-01 RX ADMIN — CARBIDOPA AND LEVODOPA 1 TABLET: 25; 100 TABLET, EXTENDED RELEASE ORAL at 09:27

## 2020-01-01 RX ADMIN — METHYLPREDNISOLONE ACETATE 40 MG: 40 INJECTION, SUSPENSION INTRA-ARTICULAR; INTRALESIONAL; INTRAMUSCULAR; SOFT TISSUE at 10:06

## 2020-01-01 RX ADMIN — TIOTROPIUM BROMIDE INHALATION SPRAY 2 PUFF: 3.12 SPRAY, METERED RESPIRATORY (INHALATION) at 06:12

## 2020-01-01 RX ADMIN — ACETAMINOPHEN 650 MG: 325 TABLET ORAL at 03:31

## 2020-01-01 RX ADMIN — ROPINIROLE HYDROCHLORIDE 2 MG: 2 TABLET, FILM COATED ORAL at 20:32

## 2020-01-01 RX ADMIN — AMIODARONE HYDROCHLORIDE 200 MG: 200 TABLET ORAL at 12:36

## 2020-01-01 RX ADMIN — FERROUS SULFATE TAB EC 325 MG (65 MG FE EQUIVALENT) 325 MG: 325 (65 FE) TABLET DELAYED RESPONSE at 09:28

## 2020-01-01 RX ADMIN — MODAFINIL 100 MG: 200 TABLET ORAL at 08:54

## 2020-01-01 RX ADMIN — CARBIDOPA AND LEVODOPA 1 TABLET: 25; 100 TABLET, EXTENDED RELEASE ORAL at 17:00

## 2020-01-01 RX ADMIN — ACETAMINOPHEN 650 MG: 325 TABLET ORAL at 17:31

## 2020-01-01 RX ADMIN — ALBUTEROL SULFATE 2.5 MG: 2.5 SOLUTION RESPIRATORY (INHALATION) at 11:36

## 2020-01-01 RX ADMIN — DARBEPOETIN ALFA 200 MCG: 200 INJECTION, SOLUTION INTRAVENOUS; SUBCUTANEOUS at 14:32

## 2020-01-01 RX ADMIN — ALBUTEROL SULFATE 2.5 MG: 2.5 SOLUTION RESPIRATORY (INHALATION) at 07:42

## 2020-01-01 RX ADMIN — AZITHROMYCIN MONOHYDRATE 500 MG: 500 INJECTION, POWDER, LYOPHILIZED, FOR SOLUTION INTRAVENOUS at 21:20

## 2020-01-01 RX ADMIN — PREDNISONE 40 MG: 20 TABLET ORAL at 08:55

## 2020-01-01 RX ADMIN — FUROSEMIDE 20 MG: 10 INJECTION, SOLUTION INTRAMUSCULAR; INTRAVENOUS at 17:48

## 2020-01-01 RX ADMIN — PREDNISONE 40 MG: 20 TABLET ORAL at 12:33

## 2020-01-01 RX ADMIN — BUDESONIDE AND FORMOTEROL FUMARATE DIHYDRATE 2 PUFF: 160; 4.5 AEROSOL RESPIRATORY (INHALATION) at 07:32

## 2020-01-01 RX ADMIN — ALBUTEROL SULFATE 2.5 MG: 2.5 SOLUTION RESPIRATORY (INHALATION) at 20:27

## 2020-01-01 RX ADMIN — CARBIDOPA AND LEVODOPA 1 TABLET: 25; 100 TABLET, EXTENDED RELEASE ORAL at 21:14

## 2020-01-01 RX ADMIN — ALBUTEROL SULFATE 2.5 MG: 2.5 SOLUTION RESPIRATORY (INHALATION) at 20:55

## 2020-01-01 RX ADMIN — INSULIN LISPRO 1 UNITS: 100 INJECTION, SOLUTION INTRAVENOUS; SUBCUTANEOUS at 13:10

## 2020-01-01 RX ADMIN — LIDOCAINE HYDROCHLORIDE 5 ML: 10 INJECTION, SOLUTION INFILTRATION; PERINEURAL at 10:06

## 2020-01-01 RX ADMIN — MODAFINIL 100 MG: 200 TABLET ORAL at 08:47

## 2020-01-01 RX ADMIN — PANTOPRAZOLE SODIUM 40 MG: 40 TABLET, DELAYED RELEASE ORAL at 05:37

## 2020-01-01 RX ADMIN — ATORVASTATIN CALCIUM 20 MG: 20 TABLET, FILM COATED ORAL at 09:28

## 2020-01-01 RX ADMIN — ACETAMINOPHEN 650 MG: 325 TABLET ORAL at 20:30

## 2020-01-01 RX ADMIN — INSULIN LISPRO 3 UNITS: 100 INJECTION, SOLUTION INTRAVENOUS; SUBCUTANEOUS at 11:58

## 2020-01-01 RX ADMIN — INSULIN LISPRO 1 UNITS: 100 INJECTION, SOLUTION INTRAVENOUS; SUBCUTANEOUS at 08:50

## 2020-01-01 RX ADMIN — BUDESONIDE AND FORMOTEROL FUMARATE DIHYDRATE 2 PUFF: 160; 4.5 AEROSOL RESPIRATORY (INHALATION) at 05:58

## 2020-01-01 RX ADMIN — ROPINIROLE HYDROCHLORIDE 2 MG: 2 TABLET, FILM COATED ORAL at 13:09

## 2020-01-01 RX ADMIN — INSULIN LISPRO 2 UNITS: 100 INJECTION, SOLUTION INTRAVENOUS; SUBCUTANEOUS at 21:15

## 2020-01-01 RX ADMIN — Medication 25 MCG: at 09:49

## 2020-01-01 RX ADMIN — CARBIDOPA AND LEVODOPA 1 TABLET: 25; 100 TABLET, EXTENDED RELEASE ORAL at 09:28

## 2020-01-01 RX ADMIN — SODIUM CHLORIDE, PRESERVATIVE FREE 10 ML: 5 INJECTION INTRAVENOUS at 23:43

## 2020-01-01 RX ADMIN — CARBIDOPA AND LEVODOPA 1 TABLET: 25; 100 TABLET, EXTENDED RELEASE ORAL at 09:24

## 2020-01-01 RX ADMIN — ROPINIROLE HYDROCHLORIDE 2 MG: 2 TABLET, FILM COATED ORAL at 19:51

## 2020-01-01 RX ADMIN — ROPINIROLE HYDROCHLORIDE 2 MG: 2 TABLET, FILM COATED ORAL at 13:02

## 2020-01-01 RX ADMIN — SODIUM CHLORIDE, PRESERVATIVE FREE 10 ML: 5 INJECTION INTRAVENOUS at 12:20

## 2020-01-01 RX ADMIN — APIXABAN 2.5 MG: 2.5 TABLET, FILM COATED ORAL at 21:47

## 2020-01-01 RX ADMIN — METOPROLOL TARTRATE 25 MG: 25 TABLET, FILM COATED ORAL at 11:42

## 2020-01-01 RX ADMIN — PREDNISONE 40 MG: 20 TABLET ORAL at 08:45

## 2020-01-01 RX ADMIN — CARBIDOPA AND LEVODOPA 1 TABLET: 25; 100 TABLET, EXTENDED RELEASE ORAL at 17:41

## 2020-01-01 RX ADMIN — SODIUM CHLORIDE, PRESERVATIVE FREE 10 ML: 5 INJECTION INTRAVENOUS at 21:00

## 2020-01-01 RX ADMIN — SODIUM CHLORIDE, PRESERVATIVE FREE 10 ML: 5 INJECTION INTRAVENOUS at 11:42

## 2020-01-01 RX ADMIN — CARBIDOPA AND LEVODOPA 1 TABLET: 25; 100 TABLET, EXTENDED RELEASE ORAL at 13:03

## 2020-01-01 RX ADMIN — ROPINIROLE HYDROCHLORIDE 2 MG: 2 TABLET, FILM COATED ORAL at 08:54

## 2020-01-01 RX ADMIN — FUROSEMIDE 20 MG: 10 INJECTION, SOLUTION INTRAMUSCULAR; INTRAVENOUS at 17:31

## 2020-01-01 RX ADMIN — ATORVASTATIN CALCIUM 20 MG: 20 TABLET, FILM COATED ORAL at 08:45

## 2020-01-01 RX ADMIN — APIXABAN 5 MG: 5 TABLET, FILM COATED ORAL at 09:27

## 2020-01-01 RX ADMIN — LIDOCAINE HYDROCHLORIDE 100 MG: 20 INJECTION, SOLUTION EPIDURAL; INFILTRATION; INTRACAUDAL; PERINEURAL at 09:38

## 2020-01-01 RX ADMIN — CARBIDOPA AND LEVODOPA 1 TABLET: 25; 100 TABLET, EXTENDED RELEASE ORAL at 08:43

## 2020-01-01 RX ADMIN — ALBUTEROL SULFATE 2.5 MG: 2.5 SOLUTION RESPIRATORY (INHALATION) at 20:42

## 2020-01-01 RX ADMIN — MODAFINIL 100 MG: 200 TABLET ORAL at 17:31

## 2020-01-01 RX ADMIN — ROPINIROLE HYDROCHLORIDE 2 MG: 2 TABLET, FILM COATED ORAL at 12:58

## 2020-01-01 RX ADMIN — INSULIN LISPRO 1 UNITS: 100 INJECTION, SOLUTION INTRAVENOUS; SUBCUTANEOUS at 09:24

## 2020-01-01 RX ADMIN — ALBUTEROL SULFATE 2.5 MG: 2.5 SOLUTION RESPIRATORY (INHALATION) at 05:57

## 2020-01-01 RX ADMIN — TIOTROPIUM BROMIDE INHALATION SPRAY 2 PUFF: 3.12 SPRAY, METERED RESPIRATORY (INHALATION) at 07:48

## 2020-01-01 RX ADMIN — BUDESONIDE AND FORMOTEROL FUMARATE DIHYDRATE 2 PUFF: 160; 4.5 AEROSOL RESPIRATORY (INHALATION) at 20:42

## 2020-01-01 RX ADMIN — ROPINIROLE HYDROCHLORIDE 2 MG: 2 TABLET, FILM COATED ORAL at 23:42

## 2020-01-01 RX ADMIN — SODIUM CHLORIDE, PRESERVATIVE FREE 10 ML: 5 INJECTION INTRAVENOUS at 19:49

## 2020-01-01 RX ADMIN — ISOSORBIDE MONONITRATE 30 MG: 30 TABLET ORAL at 12:35

## 2020-01-01 RX ADMIN — FUROSEMIDE 20 MG: 10 INJECTION, SOLUTION INTRAMUSCULAR; INTRAVENOUS at 17:29

## 2020-01-01 RX ADMIN — ATORVASTATIN CALCIUM 20 MG: 20 TABLET, FILM COATED ORAL at 08:43

## 2020-01-01 RX ADMIN — AZITHROMYCIN MONOHYDRATE 500 MG: 500 INJECTION, POWDER, LYOPHILIZED, FOR SOLUTION INTRAVENOUS at 20:41

## 2020-01-01 RX ADMIN — SODIUM CHLORIDE, PRESERVATIVE FREE 10 ML: 5 INJECTION INTRAVENOUS at 20:18

## 2020-01-01 RX ADMIN — PROPOFOL 120 MG: 10 INJECTION, EMULSION INTRAVENOUS at 09:42

## 2020-01-01 RX ADMIN — INSULIN LISPRO 2 UNITS: 100 INJECTION, SOLUTION INTRAVENOUS; SUBCUTANEOUS at 11:58

## 2020-01-01 RX ADMIN — CALCIUM ACETATE 667 MG: 667 CAPSULE ORAL at 13:04

## 2020-01-01 RX ADMIN — CARBIDOPA AND LEVODOPA 1 TABLET: 25; 100 TABLET, EXTENDED RELEASE ORAL at 21:47

## 2020-01-01 RX ADMIN — APIXABAN 2.5 MG: 2.5 TABLET, FILM COATED ORAL at 09:27

## 2020-01-01 RX ADMIN — ALBUTEROL SULFATE 2.5 MG: 2.5 SOLUTION RESPIRATORY (INHALATION) at 19:48

## 2020-01-01 RX ADMIN — ASPIRIN 81 MG: 81 TABLET, COATED ORAL at 09:27

## 2020-01-01 RX ADMIN — INSULIN LISPRO 1 UNITS: 100 INJECTION, SOLUTION INTRAVENOUS; SUBCUTANEOUS at 13:42

## 2020-01-01 RX ADMIN — NALOXONE HYDROCHLORIDE 0.08 MG: 0.4 INJECTION, SOLUTION INTRAMUSCULAR; INTRAVENOUS; SUBCUTANEOUS at 11:00

## 2020-01-01 RX ADMIN — ALBUTEROL SULFATE 2.5 MG: 2.5 SOLUTION RESPIRATORY (INHALATION) at 07:48

## 2020-01-01 RX ADMIN — BUDESONIDE AND FORMOTEROL FUMARATE DIHYDRATE 2 PUFF: 160; 4.5 AEROSOL RESPIRATORY (INHALATION) at 20:05

## 2020-01-01 RX ADMIN — FERROUS SULFATE TAB EC 325 MG (65 MG FE EQUIVALENT) 325 MG: 325 (65 FE) TABLET DELAYED RESPONSE at 17:46

## 2020-01-01 RX ADMIN — ROPINIROLE HYDROCHLORIDE 2 MG: 2 TABLET, FILM COATED ORAL at 19:47

## 2020-01-01 RX ADMIN — CYANOCOBALAMIN 1000 MCG: 1000 INJECTION, SOLUTION INTRAMUSCULAR at 13:44

## 2020-01-01 RX ADMIN — SODIUM CHLORIDE, PRESERVATIVE FREE 10 ML: 5 INJECTION INTRAVENOUS at 19:52

## 2020-01-01 RX ADMIN — INSULIN LISPRO 2 UNITS: 100 INJECTION, SOLUTION INTRAVENOUS; SUBCUTANEOUS at 21:49

## 2020-01-01 RX ADMIN — MELATONIN TAB 3 MG 3 MG: 3 TAB at 22:26

## 2020-01-01 RX ADMIN — MELATONIN TAB 3 MG 3 MG: 3 TAB at 23:30

## 2020-01-01 RX ADMIN — Medication 0.6 MG: at 10:39

## 2020-01-01 RX ADMIN — ALBUTEROL SULFATE 2.5 MG: 2.5 SOLUTION RESPIRATORY (INHALATION) at 04:05

## 2020-01-01 RX ADMIN — ACETAMINOPHEN 650 MG: 325 TABLET ORAL at 07:55

## 2020-01-01 RX ADMIN — METOPROLOL TARTRATE 25 MG: 25 TABLET, FILM COATED ORAL at 09:23

## 2020-01-01 RX ADMIN — INSULIN LISPRO 2 UNITS: 100 INJECTION, SOLUTION INTRAVENOUS; SUBCUTANEOUS at 08:44

## 2020-01-01 RX ADMIN — PANTOPRAZOLE SODIUM 40 MG: 40 TABLET, DELAYED RELEASE ORAL at 06:06

## 2020-01-01 RX ADMIN — CALCIUM ACETATE 667 MG: 667 CAPSULE ORAL at 09:27

## 2020-01-01 RX ADMIN — DARBEPOETIN ALFA 60 MCG: 60 INJECTION, SOLUTION INTRAVENOUS; SUBCUTANEOUS at 17:25

## 2020-01-01 RX ADMIN — MIDAZOLAM 2 MG: 1 INJECTION INTRAMUSCULAR; INTRAVENOUS at 09:33

## 2020-01-01 RX ADMIN — METHYLPREDNISOLONE SODIUM SUCCINATE 40 MG: 40 INJECTION, POWDER, FOR SOLUTION INTRAMUSCULAR; INTRAVENOUS at 21:20

## 2020-01-01 RX ADMIN — CARBIDOPA AND LEVODOPA 1 TABLET: 25; 100 TABLET, EXTENDED RELEASE ORAL at 14:09

## 2020-01-01 RX ADMIN — RASAGILINE 1 MG: 0.5 TABLET ORAL at 08:44

## 2020-01-01 RX ADMIN — MODAFINIL 100 MG: 200 TABLET ORAL at 12:34

## 2020-01-01 RX ADMIN — ROPINIROLE HYDROCHLORIDE 2 MG: 2 TABLET, FILM COATED ORAL at 20:01

## 2020-01-01 RX ADMIN — METOPROLOL TARTRATE 25 MG: 25 TABLET, FILM COATED ORAL at 08:54

## 2020-01-01 RX ADMIN — SODIUM CHLORIDE, PRESERVATIVE FREE 10 ML: 5 INJECTION INTRAVENOUS at 09:26

## 2020-01-01 RX ADMIN — CARBIDOPA AND LEVODOPA 1 TABLET: 25; 100 TABLET, EXTENDED RELEASE ORAL at 20:32

## 2020-01-01 RX ADMIN — ALBUTEROL SULFATE 2.5 MG: 2.5 SOLUTION RESPIRATORY (INHALATION) at 11:01

## 2020-01-01 RX ADMIN — ONDANSETRON 4 MG: 2 INJECTION, SOLUTION INTRAMUSCULAR; INTRAVENOUS at 09:54

## 2020-01-01 RX ADMIN — ACETAMINOPHEN AND CODEINE PHOSPHATE 1 TABLET: 300; 30 TABLET ORAL at 15:09

## 2020-01-01 RX ADMIN — ALBUTEROL SULFATE 2.5 MG: 2.5 SOLUTION RESPIRATORY (INHALATION) at 15:16

## 2020-01-01 RX ADMIN — FUROSEMIDE 20 MG: 10 INJECTION, SOLUTION INTRAMUSCULAR; INTRAVENOUS at 18:13

## 2020-01-01 RX ADMIN — CARBIDOPA AND LEVODOPA 1 TABLET: 25; 100 TABLET, EXTENDED RELEASE ORAL at 13:10

## 2020-01-01 RX ADMIN — RASAGILINE 1 MG: 0.5 TABLET ORAL at 09:23

## 2020-01-01 RX ADMIN — INSULIN LISPRO 1 UNITS: 100 INJECTION, SOLUTION INTRAVENOUS; SUBCUTANEOUS at 19:51

## 2020-01-01 RX ADMIN — ALBUTEROL SULFATE 2.5 MG: 2.5 SOLUTION RESPIRATORY (INHALATION) at 11:13

## 2020-01-01 RX ADMIN — PANTOPRAZOLE SODIUM 40 MG: 40 TABLET, DELAYED RELEASE ORAL at 05:02

## 2020-01-01 RX ADMIN — ROPINIROLE HYDROCHLORIDE 2 MG: 2 TABLET, FILM COATED ORAL at 20:48

## 2020-01-01 RX ADMIN — Medication 25 MCG: at 10:13

## 2020-01-01 RX ADMIN — CYANOCOBALAMIN 1000 MCG: 1000 INJECTION, SOLUTION INTRAMUSCULAR at 14:46

## 2020-01-01 RX ADMIN — CARBIDOPA AND LEVODOPA 1 TABLET: 25; 100 TABLET, EXTENDED RELEASE ORAL at 20:12

## 2020-01-01 RX ADMIN — METHYLPREDNISOLONE SODIUM SUCCINATE 40 MG: 40 INJECTION, POWDER, FOR SOLUTION INTRAMUSCULAR; INTRAVENOUS at 08:43

## 2020-01-01 RX ADMIN — ACETAMINOPHEN 650 MG: 325 TABLET ORAL at 08:58

## 2020-01-01 RX ADMIN — CARBIDOPA AND LEVODOPA 1 TABLET: 25; 100 TABLET, EXTENDED RELEASE ORAL at 20:01

## 2020-01-01 RX ADMIN — ATORVASTATIN CALCIUM 20 MG: 20 TABLET, FILM COATED ORAL at 12:36

## 2020-01-01 RX ADMIN — ALBUTEROL SULFATE 2.5 MG: 2.5 SOLUTION RESPIRATORY (INHALATION) at 14:55

## 2020-01-01 RX ADMIN — AMIODARONE HYDROCHLORIDE 200 MG: 200 TABLET ORAL at 08:44

## 2020-01-01 RX ADMIN — ISOSORBIDE MONONITRATE 30 MG: 30 TABLET ORAL at 09:05

## 2020-01-01 RX ADMIN — ALPRAZOLAM 0.25 MG: 0.25 TABLET ORAL at 08:54

## 2020-01-01 RX ADMIN — FUROSEMIDE 20 MG: 20 TABLET ORAL at 09:24

## 2020-01-01 RX ADMIN — BUDESONIDE AND FORMOTEROL FUMARATE DIHYDRATE 2 PUFF: 160; 4.5 AEROSOL RESPIRATORY (INHALATION) at 07:52

## 2020-01-01 RX ADMIN — CARBIDOPA AND LEVODOPA 1 TABLET: 25; 100 TABLET, EXTENDED RELEASE ORAL at 19:51

## 2020-01-01 RX ADMIN — ALBUTEROL SULFATE 2.5 MG: 2.5 SOLUTION RESPIRATORY (INHALATION) at 08:02

## 2020-01-01 RX ADMIN — AMIODARONE HYDROCHLORIDE 200 MG: 200 TABLET ORAL at 08:43

## 2020-01-01 RX ADMIN — AZITHROMYCIN MONOHYDRATE 500 MG: 500 INJECTION, POWDER, LYOPHILIZED, FOR SOLUTION INTRAVENOUS at 22:16

## 2020-01-01 RX ADMIN — METOPROLOL TARTRATE 25 MG: 25 TABLET, FILM COATED ORAL at 08:44

## 2020-01-01 RX ADMIN — AMIODARONE HYDROCHLORIDE 200 MG: 200 TABLET ORAL at 08:45

## 2020-01-01 RX ADMIN — ROPINIROLE HYDROCHLORIDE 2 MG: 2 TABLET, FILM COATED ORAL at 09:10

## 2020-01-01 RX ADMIN — INSULIN LISPRO 2 UNITS: 100 INJECTION, SOLUTION INTRAVENOUS; SUBCUTANEOUS at 17:29

## 2020-01-01 RX ADMIN — CARBIDOPA AND LEVODOPA 1 TABLET: 25; 100 TABLET, EXTENDED RELEASE ORAL at 08:54

## 2020-01-01 RX ADMIN — METHYLPREDNISOLONE SODIUM SUCCINATE 40 MG: 40 INJECTION, POWDER, FOR SOLUTION INTRAMUSCULAR; INTRAVENOUS at 10:43

## 2020-01-01 RX ADMIN — DARBEPOETIN ALFA 200 MCG: 200 INJECTION, SOLUTION INTRAVENOUS; SUBCUTANEOUS at 11:56

## 2020-01-01 RX ADMIN — RASAGILINE 1 MG: 0.5 TABLET ORAL at 09:17

## 2020-01-01 RX ADMIN — CALCIUM GLUCONATE 1 G: 98 INJECTION, SOLUTION INTRAVENOUS at 13:42

## 2020-01-01 RX ADMIN — CARBIDOPA AND LEVODOPA 1 TABLET: 25; 100 TABLET, EXTENDED RELEASE ORAL at 20:30

## 2020-01-01 RX ADMIN — INSULIN LISPRO 1 UNITS: 100 INJECTION, SOLUTION INTRAVENOUS; SUBCUTANEOUS at 20:00

## 2020-01-01 RX ADMIN — CARBIDOPA AND LEVODOPA 1 TABLET: 25; 100 TABLET, EXTENDED RELEASE ORAL at 17:31

## 2020-01-01 RX ADMIN — ROPINIROLE HYDROCHLORIDE 2 MG: 2 TABLET, FILM COATED ORAL at 12:33

## 2020-01-01 RX ADMIN — INSULIN LISPRO 1 UNITS: 100 INJECTION, SOLUTION INTRAVENOUS; SUBCUTANEOUS at 09:06

## 2020-01-01 RX ADMIN — AZITHROMYCIN MONOHYDRATE 500 MG: 500 INJECTION, POWDER, LYOPHILIZED, FOR SOLUTION INTRAVENOUS at 21:46

## 2020-01-01 RX ADMIN — ONDANSETRON 4 MG: 2 INJECTION INTRAMUSCULAR; INTRAVENOUS at 20:41

## 2020-01-01 RX ADMIN — BUDESONIDE AND FORMOTEROL FUMARATE DIHYDRATE 2 PUFF: 160; 4.5 AEROSOL RESPIRATORY (INHALATION) at 07:56

## 2020-01-01 RX ADMIN — BUDESONIDE AND FORMOTEROL FUMARATE DIHYDRATE 2 PUFF: 160; 4.5 AEROSOL RESPIRATORY (INHALATION) at 07:48

## 2020-01-01 RX ADMIN — METHYLPREDNISOLONE SODIUM SUCCINATE 125 MG: 125 INJECTION, POWDER, FOR SOLUTION INTRAMUSCULAR; INTRAVENOUS at 18:09

## 2020-01-01 RX ADMIN — INSULIN LISPRO 5 UNITS: 100 INJECTION, SOLUTION INTRAVENOUS; SUBCUTANEOUS at 18:06

## 2020-01-01 RX ADMIN — DARBEPOETIN ALFA 200 MCG: 200 INJECTION, SOLUTION INTRAVENOUS; SUBCUTANEOUS at 14:54

## 2020-01-01 RX ADMIN — ROPINIROLE HYDROCHLORIDE 2 MG: 2 TABLET, FILM COATED ORAL at 09:05

## 2020-01-01 RX ADMIN — BUDESONIDE AND FORMOTEROL FUMARATE DIHYDRATE 2 PUFF: 160; 4.5 AEROSOL RESPIRATORY (INHALATION) at 17:13

## 2020-01-01 RX ADMIN — ACETAMINOPHEN 650 MG: 325 TABLET ORAL at 09:36

## 2020-01-01 RX ADMIN — CYANOCOBALAMIN 1000 MCG: 1000 INJECTION, SOLUTION INTRAMUSCULAR at 15:06

## 2020-01-01 RX ADMIN — CARBIDOPA AND LEVODOPA 1 TABLET: 25; 100 TABLET, EXTENDED RELEASE ORAL at 12:59

## 2020-01-01 RX ADMIN — TIOTROPIUM BROMIDE INHALATION SPRAY 2 PUFF: 3.12 SPRAY, METERED RESPIRATORY (INHALATION) at 07:55

## 2020-01-01 RX ADMIN — DARBEPOETIN ALFA 200 MCG: 200 INJECTION, SOLUTION INTRAVENOUS; SUBCUTANEOUS at 13:44

## 2020-01-01 RX ADMIN — CALCIUM CARBONATE-CHOLECALCIFEROL TAB 250 MG-125 UNIT 500 MG: 250-125 TAB at 12:58

## 2020-01-01 RX ADMIN — DARBEPOETIN ALFA 200 MCG: 200 INJECTION, SOLUTION INTRAVENOUS; SUBCUTANEOUS at 14:45

## 2020-01-01 RX ADMIN — ALBUTEROL SULFATE 2.5 MG: 2.5 SOLUTION RESPIRATORY (INHALATION) at 07:55

## 2020-01-01 RX ADMIN — APIXABAN 2.5 MG: 2.5 TABLET, FILM COATED ORAL at 09:05

## 2020-01-01 RX ADMIN — METOPROLOL TARTRATE 25 MG: 25 TABLET, FILM COATED ORAL at 12:35

## 2020-01-01 RX ADMIN — PANTOPRAZOLE SODIUM 40 MG: 40 TABLET, DELAYED RELEASE ORAL at 06:12

## 2020-01-01 RX ADMIN — SODIUM CHLORIDE, POTASSIUM CHLORIDE, SODIUM LACTATE AND CALCIUM CHLORIDE: 600; 310; 30; 20 INJECTION, SOLUTION INTRAVENOUS at 09:34

## 2020-01-01 RX ADMIN — FUROSEMIDE 20 MG: 20 TABLET ORAL at 12:14

## 2020-01-01 RX ADMIN — ROPINIROLE HYDROCHLORIDE 2 MG: 2 TABLET, FILM COATED ORAL at 21:14

## 2020-01-01 RX ADMIN — INSULIN LISPRO 3 UNITS: 100 INJECTION, SOLUTION INTRAVENOUS; SUBCUTANEOUS at 00:20

## 2020-01-01 RX ADMIN — ALBUTEROL SULFATE 2.5 MG: 2.5 SOLUTION RESPIRATORY (INHALATION) at 11:37

## 2020-01-01 RX ADMIN — APIXABAN 5 MG: 5 TABLET, FILM COATED ORAL at 09:24

## 2020-01-01 RX ADMIN — ALPRAZOLAM 0.25 MG: 0.25 TABLET ORAL at 21:20

## 2020-01-01 RX ADMIN — ALBUTEROL SULFATE 2.5 MG: 2.5 SOLUTION RESPIRATORY (INHALATION) at 20:05

## 2020-01-01 RX ADMIN — ROPINIROLE HYDROCHLORIDE 2 MG: 2 TABLET, FILM COATED ORAL at 13:04

## 2020-01-01 RX ADMIN — CARBIDOPA AND LEVODOPA 1 TABLET: 25; 100 TABLET, EXTENDED RELEASE ORAL at 17:48

## 2020-01-01 RX ADMIN — ROPINIROLE HYDROCHLORIDE 2 MG: 2 TABLET, FILM COATED ORAL at 15:50

## 2020-01-01 RX ADMIN — ROPINIROLE HYDROCHLORIDE 2 MG: 2 TABLET, FILM COATED ORAL at 12:11

## 2020-01-01 RX ADMIN — MELATONIN TAB 3 MG 3 MG: 3 TAB at 21:47

## 2020-01-01 RX ADMIN — CALCITRIOL 0.75 MCG: 0.25 CAPSULE ORAL at 09:23

## 2020-01-01 RX ADMIN — SODIUM CHLORIDE, PRESERVATIVE FREE 10 ML: 5 INJECTION INTRAVENOUS at 08:51

## 2020-01-01 RX ADMIN — DARBEPOETIN ALFA 200 MCG: 200 INJECTION, SOLUTION INTRAVENOUS; SUBCUTANEOUS at 11:17

## 2020-01-01 RX ADMIN — INSULIN LISPRO 1 UNITS: 100 INJECTION, SOLUTION INTRAVENOUS; SUBCUTANEOUS at 09:55

## 2020-01-01 RX ADMIN — Medication 50 MCG: at 09:38

## 2020-01-01 RX ADMIN — ATORVASTATIN CALCIUM 10 MG: 10 TABLET, FILM COATED ORAL at 09:27

## 2020-01-01 RX ADMIN — ROPINIROLE HYDROCHLORIDE 2 MG: 2 TABLET, FILM COATED ORAL at 12:59

## 2020-01-01 RX ADMIN — PREDNISONE 40 MG: 20 TABLET ORAL at 17:24

## 2020-01-01 RX ADMIN — ATORVASTATIN CALCIUM 20 MG: 20 TABLET, FILM COATED ORAL at 08:44

## 2020-01-01 RX ADMIN — ACETAMINOPHEN 650 MG: 325 TABLET ORAL at 19:53

## 2020-01-01 RX ADMIN — INSULIN LISPRO 2 UNITS: 100 INJECTION, SOLUTION INTRAVENOUS; SUBCUTANEOUS at 20:48

## 2020-01-01 RX ADMIN — METHYLPREDNISOLONE SODIUM SUCCINATE 40 MG: 40 INJECTION, POWDER, FOR SOLUTION INTRAMUSCULAR; INTRAVENOUS at 20:01

## 2020-01-01 RX ADMIN — METOPROLOL TARTRATE 25 MG: 25 TABLET, FILM COATED ORAL at 09:27

## 2020-01-01 RX ADMIN — BUDESONIDE AND FORMOTEROL FUMARATE DIHYDRATE 2 PUFF: 160; 4.5 AEROSOL RESPIRATORY (INHALATION) at 19:41

## 2020-01-01 RX ADMIN — DARBEPOETIN ALFA 200 MCG: 200 INJECTION, SOLUTION INTRAVENOUS; SUBCUTANEOUS at 15:12

## 2020-01-01 RX ADMIN — BUDESONIDE AND FORMOTEROL FUMARATE DIHYDRATE 2 PUFF: 160; 4.5 AEROSOL RESPIRATORY (INHALATION) at 06:12

## 2020-01-01 RX ADMIN — ISOSORBIDE MONONITRATE 30 MG: 30 TABLET ORAL at 08:42

## 2020-01-01 RX ADMIN — ALPRAZOLAM 0.25 MG: 0.25 TABLET ORAL at 03:24

## 2020-01-01 RX ADMIN — APIXABAN 2.5 MG: 2.5 TABLET, FILM COATED ORAL at 20:30

## 2020-01-01 RX ADMIN — CARBIDOPA AND LEVODOPA 1 TABLET: 25; 100 TABLET, EXTENDED RELEASE ORAL at 08:46

## 2020-01-01 RX ADMIN — RASAGILINE 1 MG: 0.5 TABLET ORAL at 11:42

## 2020-01-01 RX ADMIN — MELATONIN TAB 3 MG 3 MG: 3 TAB at 22:04

## 2020-01-01 RX ADMIN — ALBUTEROL SULFATE 2.5 MG: 2.5 SOLUTION RESPIRATORY (INHALATION) at 11:09

## 2020-01-01 RX ADMIN — INSULIN LISPRO 2 UNITS: 100 INJECTION, SOLUTION INTRAVENOUS; SUBCUTANEOUS at 17:41

## 2020-01-01 RX ADMIN — TIOTROPIUM BROMIDE INHALATION SPRAY 2 PUFF: 3.12 SPRAY, METERED RESPIRATORY (INHALATION) at 06:06

## 2020-01-01 RX ADMIN — METOPROLOL TARTRATE 25 MG: 25 TABLET ORAL at 20:12

## 2020-01-01 RX ADMIN — ALBUTEROL SULFATE 2.5 MG: 2.5 SOLUTION RESPIRATORY (INHALATION) at 07:50

## 2020-01-01 RX ADMIN — INSULIN LISPRO 1 UNITS: 100 INJECTION, SOLUTION INTRAVENOUS; SUBCUTANEOUS at 09:10

## 2020-01-01 RX ADMIN — RASAGILINE 1 MG: 0.5 TABLET ORAL at 12:33

## 2020-01-01 RX ADMIN — AMIODARONE HYDROCHLORIDE 200 MG: 200 TABLET ORAL at 09:23

## 2020-01-01 RX ADMIN — METHYLPREDNISOLONE SODIUM SUCCINATE 40 MG: 40 INJECTION, POWDER, FOR SOLUTION INTRAMUSCULAR; INTRAVENOUS at 03:31

## 2020-01-01 RX ADMIN — TRAZODONE HYDROCHLORIDE 50 MG: 50 TABLET ORAL at 20:13

## 2020-01-01 RX ADMIN — ACETAMINOPHEN 650 MG: 325 TABLET ORAL at 05:16

## 2020-01-01 RX ADMIN — CARBIDOPA AND LEVODOPA 1 TABLET: 25; 100 TABLET, EXTENDED RELEASE ORAL at 11:57

## 2020-01-01 RX ADMIN — METHYLPREDNISOLONE SODIUM SUCCINATE 40 MG: 40 INJECTION, POWDER, FOR SOLUTION INTRAMUSCULAR; INTRAVENOUS at 11:27

## 2020-01-01 RX ADMIN — SODIUM CHLORIDE, PRESERVATIVE FREE 10 ML: 5 INJECTION INTRAVENOUS at 09:27

## 2020-01-01 RX ADMIN — ALPRAZOLAM 0.25 MG: 0.25 TABLET ORAL at 23:50

## 2020-01-01 ASSESSMENT — PAIN SCALES - GENERAL
PAINLEVEL_OUTOF10: 7
PAINLEVEL_OUTOF10: 0
PAINLEVEL_OUTOF10: 0
PAINLEVEL_OUTOF10: 3
PAINLEVEL_OUTOF10: 7
PAINLEVEL_OUTOF10: 0
PAINLEVEL_OUTOF10: 3
PAINLEVEL_OUTOF10: 0
PAINLEVEL_OUTOF10: 7
PAINLEVEL_OUTOF10: 8
PAINLEVEL_OUTOF10: 5
PAINLEVEL_OUTOF10: 0
PAINLEVEL_OUTOF10: 7
PAINLEVEL_OUTOF10: 8
PAINLEVEL_OUTOF10: 5
PAINLEVEL_OUTOF10: 0
PAINLEVEL_OUTOF10: 3
PAINLEVEL_OUTOF10: 6
PAINLEVEL_OUTOF10: 4
PAINLEVEL_OUTOF10: 5
PAINLEVEL_OUTOF10: 2
PAINLEVEL_OUTOF10: 0
PAINLEVEL_OUTOF10: 0
PAINLEVEL_OUTOF10: 3
PAINLEVEL_OUTOF10: 8
PAINLEVEL_OUTOF10: 3
PAINLEVEL_OUTOF10: 6
PAINLEVEL_OUTOF10: 7
PAINLEVEL_OUTOF10: 4
PAINLEVEL_OUTOF10: 0
PAINLEVEL_OUTOF10: 7
PAINLEVEL_OUTOF10: 8
PAINLEVEL_OUTOF10: 0
PAINLEVEL_OUTOF10: 7
PAINLEVEL_OUTOF10: 3
PAINLEVEL_OUTOF10: 8

## 2020-01-01 ASSESSMENT — PULMONARY FUNCTION TESTS
PIF_VALUE: 27
PIF_VALUE: 2
PIF_VALUE: 1
PIF_VALUE: 28
PIF_VALUE: 22
PIF_VALUE: 29
PIF_VALUE: 25
PIF_VALUE: 1
PIF_VALUE: 2
PIF_VALUE: 3
PIF_VALUE: 28
PIF_VALUE: 24
PIF_VALUE: 28
PIF_VALUE: 25
PIF_VALUE: 29
PIF_VALUE: 28
PIF_VALUE: 23
PIF_VALUE: 25
PIF_VALUE: 23
PIF_VALUE: 3
PIF_VALUE: 2
PIF_VALUE: 26
PIF_VALUE: 26
PIF_VALUE: 29
PIF_VALUE: 28
PIF_VALUE: 2
PIF_VALUE: 4
PIF_VALUE: 24
PIF_VALUE: 25
PIF_VALUE: 20
PIF_VALUE: 2
PIF_VALUE: 28
PIF_VALUE: 29
PIF_VALUE: 25
PIF_VALUE: 28
PIF_VALUE: 28
PIF_VALUE: 1
PIF_VALUE: 25
PIF_VALUE: 25
PIF_VALUE: 26
PIF_VALUE: 26
PIF_VALUE: 25
PIF_VALUE: 28
PIF_VALUE: 13
PIF_VALUE: 25
PIF_VALUE: 1
PIF_VALUE: 31
PIF_VALUE: 2
PIF_VALUE: 22
PIF_VALUE: 25
PIF_VALUE: 16
PIF_VALUE: 25
PIF_VALUE: 2
PIF_VALUE: 4
PIF_VALUE: 25
PIF_VALUE: 26
PIF_VALUE: 28
PIF_VALUE: 29
PIF_VALUE: 20
PIF_VALUE: 28
PIF_VALUE: 1
PIF_VALUE: 0
PIF_VALUE: 28
PIF_VALUE: 29
PIF_VALUE: 25
PIF_VALUE: 25
PIF_VALUE: 30
PIF_VALUE: 28
PIF_VALUE: 1
PIF_VALUE: 29
PIF_VALUE: 22
PIF_VALUE: 25
PIF_VALUE: 9
PIF_VALUE: 28
PIF_VALUE: 29
PIF_VALUE: 28
PIF_VALUE: 29
PIF_VALUE: 28
PIF_VALUE: 25
PIF_VALUE: 1
PIF_VALUE: 26
PIF_VALUE: 28
PIF_VALUE: 3
PIF_VALUE: 6
PIF_VALUE: 26
PIF_VALUE: 28
PIF_VALUE: 25
PIF_VALUE: 26
PIF_VALUE: 25
PIF_VALUE: 26
PIF_VALUE: 26
PIF_VALUE: 29
PIF_VALUE: 28
PIF_VALUE: 26
PIF_VALUE: 24

## 2020-01-01 ASSESSMENT — ENCOUNTER SYMPTOMS
EYE DISCHARGE: 0
WHEEZING: 1
FACIAL SWELLING: 0
SHORTNESS OF BREATH: 0
ABDOMINAL PAIN: 0
VOMITING: 0
EYE REDNESS: 0
DIARRHEA: 0
SHORTNESS OF BREATH: 1
COUGH: 0
CONSTIPATION: 0
COLOR CHANGE: 0

## 2020-01-01 ASSESSMENT — PAIN DESCRIPTION - LOCATION
LOCATION: GENERALIZED
LOCATION: HEAD
LOCATION: HEAD
LOCATION: ARM;LEG
LOCATION: HEAD
LOCATION: HEAD
LOCATION: LEG
LOCATION: GENERALIZED
LOCATION: GENERALIZED
LOCATION: HEAD
LOCATION: GENERALIZED

## 2020-01-01 ASSESSMENT — PAIN DESCRIPTION - FREQUENCY
FREQUENCY: CONTINUOUS
FREQUENCY: CONTINUOUS
FREQUENCY: INTERMITTENT
FREQUENCY: CONTINUOUS

## 2020-01-01 ASSESSMENT — PAIN DESCRIPTION - DESCRIPTORS
DESCRIPTORS: ACHING
DESCRIPTORS: DISCOMFORT
DESCRIPTORS: HEADACHE
DESCRIPTORS: HEADACHE

## 2020-01-01 ASSESSMENT — PAIN DESCRIPTION - PROGRESSION
CLINICAL_PROGRESSION: GRADUALLY WORSENING
CLINICAL_PROGRESSION: GRADUALLY WORSENING
CLINICAL_PROGRESSION: NOT CHANGED
CLINICAL_PROGRESSION: NOT CHANGED

## 2020-01-01 ASSESSMENT — PAIN DESCRIPTION - ORIENTATION
ORIENTATION: RIGHT;LEFT
ORIENTATION: RIGHT

## 2020-01-01 ASSESSMENT — PAIN DESCRIPTION - ONSET
ONSET: GRADUAL
ONSET: GRADUAL
ONSET: ON-GOING
ONSET: GRADUAL

## 2020-01-01 ASSESSMENT — PAIN DESCRIPTION - PAIN TYPE
TYPE: CHRONIC PAIN
TYPE: CHRONIC PAIN
TYPE: ACUTE PAIN
TYPE: CHRONIC PAIN
TYPE: ACUTE PAIN
TYPE: CHRONIC PAIN

## 2020-01-01 ASSESSMENT — PAIN - FUNCTIONAL ASSESSMENT
PAIN_FUNCTIONAL_ASSESSMENT: ACTIVITIES ARE NOT PREVENTED
PAIN_FUNCTIONAL_ASSESSMENT: 0-10
PAIN_FUNCTIONAL_ASSESSMENT: PREVENTS OR INTERFERES SOME ACTIVE ACTIVITIES AND ADLS
PAIN_FUNCTIONAL_ASSESSMENT: PREVENTS OR INTERFERES SOME ACTIVE ACTIVITIES AND ADLS
PAIN_FUNCTIONAL_ASSESSMENT: ACTIVITIES ARE NOT PREVENTED

## 2020-01-01 NOTE — CONSULTS
Sterling Ramirez      Name: Donell Coleman  MRN: [id number]     Acct: [de-identified]  Room: [address] [place] [address]    Admit Date: 12/30/2019  PCP: Ingrid Narvaez MD    Physician Requesting Consult:  Dr. Mendoza Dee    Reason for Consult:  Right leg pain    Chief Complaint:     Chief Complaint   Patient presents with    Chest Pain         History Obtained From:     patient    History of Present Illness:      Donell Coleman is a  69 y.o.  female who presents with Chest Pain      She is admitted with COPD exacerbation but has complaints of significant right leg burning pain. This occurs mostly at night. 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 Corby Sykes in September 2019. In a recent CT in December 2019, she still has 2 hematomas in the right groin area. She was also previously documented to have a palpable pulse in the right foot and I am unable to find her pulse. Past Medical History:     Past Medical History:   Diagnosis Date    Acute on chronic diastolic congestive heart failure ([address]     Anesthesia complication     DIFFICULTY WAKING OP    Asthma     Atrial fibrillation ([address] 2013    Cataracts, bilateral     Cellulitis     BILAT LOWER LEGS-PT STATES IS IMPROVING    Cerebral artery occlusion with cerebral infarction [facility])     Last stroke was February 2017 w/no deficits-TOTAL OF 3    CHF (congestive heart failure) (HCC)     Chronic kidney disease 2013    NOT ON DIALYSIS YET    Chronic kidney disease     Closed fracture of proximal end of right humerus with routine healing     Constipation     CHRONIC    COPD (chronic obstructive pulmonary disease) ([address] 2008    PT. SEES DR. BECK. Home O2 at 2-3L/NC 24/7.     Difficult intravenous access     VEINS ROLL    Difficulty walking     AMBULATES WITH THERAPPY    Diverticulosis     DM2 (diabetes mellitus, type 2) ([address] 2008    Full dentures     FULL UPPER ONLY    GERD CYSTOURETHROSCOPY,BIOPSIES N/A 4/4/2018    CYSTOSCOPY performed by Kathleen Meyer MD at [address] Right 5/28/2019    SHOULDER CLOSED REDUCTION PERCUTANEOUS PINNING RIGHT [facility]  12/28/2016    gastritis, esophagitis,     UPPER GASTROINTESTINAL ENDOSCOPY N/A 8/29/2018    EGD BIOPSY performed by Jesse Alvarez MD at [address] [facility]        Medications Prior to Admission:       Prior to Admission medications    Medication Sig Start Date End Date Taking? Authorizing Provider   apixaban (ELIQUIS) 2.5 MG TABS tablet Take 1 tablet by mouth 2 times daily 12/10/19   Sheryle Nurse, MD   darbepoetin albaro-polysorbate (ARANESP) 200 MCG/0.4ML SOSY injection Inject 200 mcg into the skin every 28 days    Historical Provider, MD   fluticasone-vilanterol (BREO ELLIPTA) 100-25 MCG/INH AEPB inhaler Inhale 2 puffs into the lungs daily    Historical Provider, MD   traZODone (DESYREL) 50 MG tablet Take 50 mg by mouth nightly    Historical Provider, MD   albuterol (PROVENTIL) (2.5 MG/3ML) 0.083% nebulizer solution Take 3 mLs by nebulization 4 times daily 9/11/19   Sheryle Nurse, MD   metoprolol tartrate (LOPRESSOR) 25 MG tablet Take 1 tablet by mouth 2 times daily 9/11/19   Sheryle Nurse, MD   miconazole (MICOTIN) 2 % powder Apply topically 2 times daily.  9/11/19   Sheryle Nurse, MD   calcium acetate (PHOSLO) 667 MG capsule Take 1 capsule by mouth 3 times daily (with meals) 9/11/19   Sheryle Nurse, MD   cyanocobalamin 1000 MCG/ML injection Inject 1,000 mcg into the muscle every 30 days    Historical Provider, MD   melatonin 5 MG TABS tablet Take 5 mg by mouth nightly as needed (sleep)    Historical Provider, MD   calcitRIOL (ROCALTROL) 0.25 MCG capsule Take 0.25 mcg by mouth daily    Historical Provider, MD   cyclobenzaprine (FLEXERIL) 5 MG tablet Take 5 mg by mouth 3 times daily as needed for Muscle spasms    Historical Provider, MD   furosemide (LASIX) 20 MG tablet Take 1 tablet by mouth daily 5/30/19   Arpit Martinez MD   atorvastatin (LIPITOR) 20 MG tablet TAKE 1 TABLET DAILY 4/18/19   Lesly Hill,    carbidopa-levodopa (SINEMET CR)  MG per extended release tablet Take 1 tablet by mouth 4 times daily 4/9/19   Lesly Hill, DO   rOPINIRole (REQUIP) 2 MG tablet Take 1 tablet by mouth 3 times daily 4/9/19   Lesly Hill, DO   ONETOUCH VERIO strip 1 each by In Vitro route daily As needed. 3/4/19   Lesly Hill, DO   pantoprazole (PROTONIX) 40 MG tablet TAKE 1 TABLET TWICE A DAY BEFORE MEALS 2/7/19   Lesly Hill, DO   linagliptin (TRADJENTA) 5 MG tablet Take 0.5 tablets by mouth daily 1/16/19   Lesly Hill, DO   ONE TOUCH ULTRA TEST strip USE 1 STRIP THREE TIMES A DAY 12/26/18   Lesly Hill DO   amiodarone (CORDARONE) 200 MG tablet Take 200 mg by mouth daily     Historical Provider, MD   magnesium oxide (MAG-OX) 400 (241.3 Mg) MG TABS tablet Take 1 tablet by mouth 2 times daily 9/7/18   Tiffanie Marcos DO   senna (SENOKOT) 8.6 MG tablet Take 2 tablets by mouth nightly     Historical Provider, MD   aspirin 81 MG tablet Take 81 mg by mouth daily     Historical Provider, MD   ferrous sulfate 325 (65 Fe) MG EC tablet Take 1 tablet by mouth 2 times daily (with meals) 12/21/17   Howie HO Blood, DO   rasagiline mesylate 1 MG TABS Take 1 tablet by mouth daily    Historical Provider, MD   Oxygen Concentrator Dx: Pneumonia, use as directed. Patient taking differently: Dx: Pneumonia, use as directed. ON 24/7 2/10/17   Lesly Hill DO        Allergies:       Dye [barium-containing compounds]; Pcn [penicillins]; and Bactrim [sulfamethoxazole-trimethoprim]    Social History:     Tobacco:    reports that she quit smoking about 49 years ago. Her smoking use included cigarettes. She has a 30.00 pack-year smoking history.  She has never used smokeless tobacco.  Alcohol:      reports current alcohol or movement disorder noted, cranial nerves II through XII grossly intact  Extremities - non palpable pulses  Skin - no gross lesions, rashes, or induration noted        R brachial 2+ L brachial 2+   R radial 2+ L radial 2+   R femoral 0+ L femoral 0+   R popliteal 0+ L popliteal 0+   R posterior tibial 0+ L posterior tibial 0+   R dorsalis pedis 0+ L dorsalis pedis 0+         Data:     Lab Results   Component Value Date    WBC 7.6 01/01/2020    HGB 8.2 (L) 01/01/2020    HCT 27.6 (L) 01/01/2020    .7 (H) 01/01/2020    PLT 70 (L) 01/01/2020     Lab Results   Component Value Date     01/01/2020    K 4.5 01/01/2020    CL 96 01/01/2020    CO2 34 01/01/2020    BUN 36 01/01/2020    CREATININE 1.85 01/01/2020    GLUCOSE 105 01/01/2020    GLUCOSE 132 03/07/2012    CALCIUM 8.9 01/01/2020      Lab Results   Component Value Date    INR 1.0 12/30/2019    INR 0.9 09/01/2019    INR 1.0 08/30/2019    PROTIME 10.0 12/30/2019    PROTIME 9.6 (L) 09/01/2019    PROTIME 10.3 08/30/2019       Assessment:     Primary Problem  <principal problem not specified>  1. Right leg pain, history of pseudoaneurysm    Active Hospital Problems    Diagnosis Date Noted    Chest pain [R07.9] 08/25/2019       Plan:     1. Will check arterial duplex and DEMIAN study.       Electronically signed by Andrew Eason MD on 1/1/2020 at 8:57 AM     Copy sent to Dr. Renato Zayas MD

## 2020-01-02 ENCOUNTER — HOSPITAL ENCOUNTER (OUTPATIENT)
Dept: INFUSION THERAPY | Facility: MEDICAL CENTER | Age: 75
End: 2020-01-02
Payer: MEDICARE

## 2020-01-02 VITALS
OXYGEN SATURATION: 92 % | WEIGHT: 182.7 LBS | BODY MASS INDEX: 34.49 KG/M2 | TEMPERATURE: 98.6 F | DIASTOLIC BLOOD PRESSURE: 40 MMHG | SYSTOLIC BLOOD PRESSURE: 119 MMHG | RESPIRATION RATE: 16 BRPM | HEART RATE: 69 BPM | HEIGHT: 61 IN

## 2020-01-02 PROBLEM — R07.9 CHEST PAIN, UNSPECIFIED: Status: ACTIVE | Noted: 2020-01-02

## 2020-01-02 LAB
ABSOLUTE EOS #: 0.77 K/UL (ref 0–0.44)
ABSOLUTE IMMATURE GRANULOCYTE: 0.04 K/UL (ref 0–0.3)
ABSOLUTE LYMPH #: 1.19 K/UL (ref 1.1–3.7)
ABSOLUTE MONO #: 0.68 K/UL (ref 0.1–1.2)
ANION GAP SERPL CALCULATED.3IONS-SCNC: 11 MMOL/L (ref 9–17)
BASOPHILS # BLD: 0 % (ref 0–2)
BASOPHILS ABSOLUTE: 0.03 K/UL (ref 0–0.2)
BUN BLDV-MCNC: 37 MG/DL (ref 8–23)
BUN/CREAT BLD: 18 (ref 9–20)
CALCIUM SERPL-MCNC: 9.4 MG/DL (ref 8.6–10.4)
CHLORIDE BLD-SCNC: 92 MMOL/L (ref 98–107)
CO2: 36 MMOL/L (ref 20–31)
CREAT SERPL-MCNC: 2.02 MG/DL (ref 0.5–0.9)
CULTURE: ABNORMAL
DIFFERENTIAL TYPE: ABNORMAL
EKG ATRIAL RATE: 80 BPM
EKG P AXIS: 111 DEGREES
EKG P-R INTERVAL: 186 MS
EKG Q-T INTERVAL: 390 MS
EKG QRS DURATION: 86 MS
EKG QTC CALCULATION (BAZETT): 449 MS
EKG R AXIS: -21 DEGREES
EKG T AXIS: 51 DEGREES
EKG VENTRICULAR RATE: 80 BPM
EOSINOPHILS RELATIVE PERCENT: 10 % (ref 1–4)
GFR AFRICAN AMERICAN: 29 ML/MIN
GFR NON-AFRICAN AMERICAN: 24 ML/MIN
GFR SERPL CREATININE-BSD FRML MDRD: ABNORMAL ML/MIN/{1.73_M2}
GFR SERPL CREATININE-BSD FRML MDRD: ABNORMAL ML/MIN/{1.73_M2}
GLUCOSE BLD-MCNC: 107 MG/DL (ref 70–99)
GLUCOSE BLD-MCNC: 111 MG/DL (ref 65–105)
GLUCOSE BLD-MCNC: 122 MG/DL (ref 65–105)
GLUCOSE BLD-MCNC: 89 MG/DL (ref 65–105)
HCT VFR BLD CALC: 29.8 % (ref 36.3–47.1)
HEMOGLOBIN: 8.8 G/DL (ref 11.9–15.1)
IMMATURE GRANULOCYTES: 1 %
LYMPHOCYTES # BLD: 16 % (ref 24–43)
Lab: ABNORMAL
MCH RBC QN AUTO: 31.9 PG (ref 25.2–33.5)
MCHC RBC AUTO-ENTMCNC: 29.5 G/DL (ref 28.4–34.8)
MCV RBC AUTO: 108 FL (ref 82.6–102.9)
MONOCYTES # BLD: 9 % (ref 3–12)
NRBC AUTOMATED: 0 PER 100 WBC
PDW BLD-RTO: 14.3 % (ref 11.8–14.4)
PLATELET # BLD: 72 K/UL (ref 138–453)
PLATELET ESTIMATE: ABNORMAL
PMV BLD AUTO: 11 FL (ref 8.1–13.5)
POTASSIUM SERPL-SCNC: 4.5 MMOL/L (ref 3.7–5.3)
RBC # BLD: 2.76 M/UL (ref 3.95–5.11)
RBC # BLD: ABNORMAL 10*6/UL
SEG NEUTROPHILS: 64 % (ref 36–65)
SEGMENTED NEUTROPHILS ABSOLUTE COUNT: 4.87 K/UL (ref 1.5–8.1)
SODIUM BLD-SCNC: 139 MMOL/L (ref 135–144)
SPECIMEN DESCRIPTION: ABNORMAL
WBC # BLD: 7.6 K/UL (ref 3.5–11.3)
WBC # BLD: ABNORMAL 10*3/UL

## 2020-01-02 PROCEDURE — 2580000003 HC RX 258: Performed by: INTERNAL MEDICINE

## 2020-01-02 PROCEDURE — 93923 UPR/LXTR ART STDY 3+ LVLS: CPT

## 2020-01-02 PROCEDURE — G0378 HOSPITAL OBSERVATION PER HR: HCPCS

## 2020-01-02 PROCEDURE — 2700000000 HC OXYGEN THERAPY PER DAY

## 2020-01-02 PROCEDURE — 82947 ASSAY GLUCOSE BLOOD QUANT: CPT

## 2020-01-02 PROCEDURE — 6370000000 HC RX 637 (ALT 250 FOR IP): Performed by: INTERNAL MEDICINE

## 2020-01-02 PROCEDURE — 94640 AIRWAY INHALATION TREATMENT: CPT

## 2020-01-02 PROCEDURE — 80048 BASIC METABOLIC PNL TOTAL CA: CPT

## 2020-01-02 PROCEDURE — 85025 COMPLETE CBC W/AUTO DIFF WBC: CPT

## 2020-01-02 PROCEDURE — 96376 TX/PRO/DX INJ SAME DRUG ADON: CPT

## 2020-01-02 PROCEDURE — 36415 COLL VENOUS BLD VENIPUNCTURE: CPT

## 2020-01-02 PROCEDURE — 93926 LOWER EXTREMITY STUDY: CPT

## 2020-01-02 PROCEDURE — 6360000002 HC RX W HCPCS: Performed by: INTERNAL MEDICINE

## 2020-01-02 RX ORDER — ISOSORBIDE MONONITRATE 30 MG/1
30 TABLET, EXTENDED RELEASE ORAL DAILY
Qty: 30 TABLET | Refills: 0 | OUTPATIENT
Start: 2020-01-03

## 2020-01-02 RX ORDER — ISOSORBIDE MONONITRATE 30 MG/1
30 TABLET, EXTENDED RELEASE ORAL DAILY
Qty: 30 TABLET | Refills: 0 | Status: SHIPPED | OUTPATIENT
Start: 2020-01-03

## 2020-01-02 RX ADMIN — LINAGLIPTIN 2.5 MG: 5 TABLET, FILM COATED ORAL at 08:44

## 2020-01-02 RX ADMIN — CALCIUM ACETATE 667 MG: 667 CAPSULE ORAL at 08:44

## 2020-01-02 RX ADMIN — FERROUS SULFATE TAB EC 325 MG (65 MG FE EQUIVALENT) 325 MG: 325 (65 FE) TABLET DELAYED RESPONSE at 17:03

## 2020-01-02 RX ADMIN — ISOSORBIDE MONONITRATE 30 MG: 30 TABLET ORAL at 08:42

## 2020-01-02 RX ADMIN — FUROSEMIDE 20 MG: 20 TABLET ORAL at 08:44

## 2020-01-02 RX ADMIN — APIXABAN 2.5 MG: 2.5 TABLET, FILM COATED ORAL at 08:44

## 2020-01-02 RX ADMIN — CALCIUM ACETATE 667 MG: 667 CAPSULE ORAL at 17:03

## 2020-01-02 RX ADMIN — AMIODARONE HYDROCHLORIDE 200 MG: 200 TABLET ORAL at 08:44

## 2020-01-02 RX ADMIN — ROPINIROLE HYDROCHLORIDE 2 MG: 2 TABLET, FILM COATED ORAL at 08:43

## 2020-01-02 RX ADMIN — SODIUM CHLORIDE, PRESERVATIVE FREE 10 ML: 5 INJECTION INTRAVENOUS at 08:46

## 2020-01-02 RX ADMIN — ACETAMINOPHEN AND CODEINE PHOSPHATE 1 TABLET: 300; 30 TABLET ORAL at 08:42

## 2020-01-02 RX ADMIN — METOPROLOL TARTRATE 25 MG: 25 TABLET ORAL at 08:44

## 2020-01-02 RX ADMIN — ATORVASTATIN CALCIUM 10 MG: 10 TABLET, FILM COATED ORAL at 08:44

## 2020-01-02 RX ADMIN — FERROUS SULFATE TAB EC 325 MG (65 MG FE EQUIVALENT) 325 MG: 325 (65 FE) TABLET DELAYED RESPONSE at 08:44

## 2020-01-02 RX ADMIN — CARBIDOPA AND LEVODOPA 1 TABLET: 25; 100 TABLET, EXTENDED RELEASE ORAL at 08:42

## 2020-01-02 RX ADMIN — RASAGILINE 1 MG: 0.5 TABLET ORAL at 08:43

## 2020-01-02 RX ADMIN — ACETAMINOPHEN AND CODEINE PHOSPHATE 1 TABLET: 300; 30 TABLET ORAL at 01:31

## 2020-01-02 RX ADMIN — CARBIDOPA AND LEVODOPA 1 TABLET: 25; 100 TABLET, EXTENDED RELEASE ORAL at 17:03

## 2020-01-02 RX ADMIN — CALCIUM ACETATE 667 MG: 667 CAPSULE ORAL at 12:39

## 2020-01-02 RX ADMIN — ROPINIROLE HYDROCHLORIDE 2 MG: 2 TABLET, FILM COATED ORAL at 12:50

## 2020-01-02 RX ADMIN — ALBUTEROL SULFATE 2.5 MG: 2.5 SOLUTION RESPIRATORY (INHALATION) at 15:38

## 2020-01-02 RX ADMIN — ALBUTEROL SULFATE 2.5 MG: 2.5 SOLUTION RESPIRATORY (INHALATION) at 08:10

## 2020-01-02 RX ADMIN — PANTOPRAZOLE SODIUM 40 MG: 40 TABLET, DELAYED RELEASE ORAL at 05:16

## 2020-01-02 RX ADMIN — ACETAMINOPHEN AND CODEINE PHOSPHATE 1 TABLET: 300; 30 TABLET ORAL at 15:24

## 2020-01-02 RX ADMIN — CARBIDOPA AND LEVODOPA 1 TABLET: 25; 100 TABLET, EXTENDED RELEASE ORAL at 12:39

## 2020-01-02 RX ADMIN — ASPIRIN 81 MG: 81 TABLET, COATED ORAL at 08:43

## 2020-01-02 RX ADMIN — CALCITRIOL 0.25 MCG: 0.25 CAPSULE ORAL at 08:42

## 2020-01-02 RX ADMIN — ONDANSETRON 4 MG: 2 INJECTION INTRAMUSCULAR; INTRAVENOUS at 16:58

## 2020-01-02 ASSESSMENT — PAIN DESCRIPTION - ORIENTATION
ORIENTATION: RIGHT
ORIENTATION: RIGHT

## 2020-01-02 ASSESSMENT — PAIN SCALES - GENERAL
PAINLEVEL_OUTOF10: 8
PAINLEVEL_OUTOF10: 5
PAINLEVEL_OUTOF10: 7
PAINLEVEL_OUTOF10: 5
PAINLEVEL_OUTOF10: 7
PAINLEVEL_OUTOF10: 8
PAINLEVEL_OUTOF10: 6
PAINLEVEL_OUTOF10: 7

## 2020-01-02 ASSESSMENT — PAIN DESCRIPTION - PAIN TYPE
TYPE: ACUTE PAIN
TYPE: ACUTE PAIN

## 2020-01-02 ASSESSMENT — PAIN DESCRIPTION - FREQUENCY
FREQUENCY: CONTINUOUS
FREQUENCY: CONTINUOUS

## 2020-01-02 ASSESSMENT — PAIN DESCRIPTION - PROGRESSION
CLINICAL_PROGRESSION: NOT CHANGED
CLINICAL_PROGRESSION: NOT CHANGED

## 2020-01-02 ASSESSMENT — PAIN DESCRIPTION - DESCRIPTORS
DESCRIPTORS: ACHING
DESCRIPTORS: ACHING

## 2020-01-02 ASSESSMENT — PAIN DESCRIPTION - LOCATION
LOCATION: LEG
LOCATION: LEG

## 2020-01-02 ASSESSMENT — PAIN DESCRIPTION - ONSET
ONSET: ON-GOING
ONSET: ON-GOING

## 2020-01-02 NOTE — PROGRESS NOTES
Dr. Neto Hammond paged to inquire about discharge and he authorizes approval for discharge from his standpoint.   Dr. Annie Herron to be notified by bedside RN
Patient admitted to room 1026 alert and oriented to room and call light. Patient denies chest pain at this time. On 2 L nasal cannula. VSS. Patient c/o leg pain 10/10 relieved by Tylenol. Admission database complete. Assessment complete. Patient's previous coccyx wound healed. No new areas of skin breakdown. Telemetry applied. Dr. Cami Yun called regarding cardiology consult. States pt does not need to be NPO for stress, would like to see patient before orders for stress test placed. Patient up in chair, no concerns at this time. Will continue to monitor.
Subjective:     Follow-up chest pain  No chest pain no palpitation no dizziness no syncopal episode no cough no shortness of breath  ROS  No fever no chills no GI/ complaints no cardiopulmonary complaints no TIA no bleeding no headache no sore throat no skin lesions, no polyuria no polydipsia no hypoglycemia, still complaining of right leg pain her arterial Doppler did not show any pseudoaneurysm 2D echo was reviewed  physical exam  General Appearance: alert and oriented to person, place and time and in no acute distress  Skin: warm and dry, no rash or erythema  Head: normocephalic and atraumatic  Eyes: pupils equal, round, and reactive to light, conjunctivae normal and sclera anicteric  Neck: neck supple and non tender without mass   Pulmonary/Chest: clear to auscultation bilaterally- no wheezes, rales or rhonchi, normal air movement, no respiratory distress  Cardiovascular: normal rate, regular rhythm, normal S1 and S2, no gallops, intact distal pulses and no carotid bruits  Abdomen: soft, non-tender, non-distended, normal bowel sounds, no masses or organomegaly  Extremities: Trace edema good pulses negative Homans sign    Neurologic: Alert oriented x3 with no focal deficit    BP (!) 119/40   Pulse 69   Temp 98.6 °F (37 °C) (Oral)   Resp 16   Ht 5' 1\" (1.549 m)   Wt 182 lb 11.2 oz (82.9 kg)   LMP 04/03/2004 (Within Years)   SpO2 92%   BMI 34.52 kg/m²     CBC:   Lab Results   Component Value Date    WBC 7.6 01/02/2020    RBC 2.76 01/02/2020    HGB 8.8 01/02/2020    HCT 29.8 01/02/2020    .0 01/02/2020    MCH 31.9 01/02/2020    MCHC 29.5 01/02/2020    RDW 14.3 01/02/2020    PLT 72 01/02/2020    MPV 11.0 01/02/2020     BMP:    Lab Results   Component Value Date     01/02/2020    K 4.5 01/02/2020    CL 92 01/02/2020    CO2 36 01/02/2020    BUN 37 01/02/2020    LABALBU 4.0 12/31/2019    LABALBU Detected 11/30/2018    CREATININE 2.02 01/02/2020    CALCIUM 9.4 01/02/2020    GFRAA 29 01/02/2020
Active Problem List   Diagnosis    Controlled type 2 diabetes mellitus with stage 3 chronic kidney disease, without long-term current use of insulin (HCC)    Hyperlipidemia    Essential hypertension    Chronic leg pain    Paroxysmal atrial fibrillation (HCC)    Asthma    Gastroesophageal reflux disease without esophagitis    ECTOR on CPAP    Type 2 diabetes mellitus with complication, without long-term current use of insulin (HCC)    Spinal stenosis in cervical region    Hypomagnesemia    Hyperphosphaturia    Hypocalcemia    Parkinson's disease ([address]    Acute renal failure ([address]    Acute cystitis with hematuria    Altered mental status    Iron deficiency anemia due to chronic blood loss    Morbid obesity with BMI of 40.0-44.9, adult ([facility])    Hyperplastic polyp of intestine    Diverticulosis    Panlobular emphysema (HCC)    Rapid atrial fibrillation ([facility])    CKD (chronic kidney disease) stage 3, GFR 30-59 ml/min ([facility])    KRISTIN (acute kidney injury) ([address]    Anemia in chronic kidney disease    Chronic respiratory failure with hypoxia and hypercapnia ([facility])    Acute kidney injury superimposed on chronic kidney disease ([address]    CKD stage 4 due to type 2 diabetes mellitus ([facility])    E. coli UTI (urinary tract infection)    Nephrolithiasis    Candidal intertrigo    Venous insufficiency    Malabsorption    Anemia of chronic renal failure, stage 4 (severe) ([facility])    Iron deficiency    Coronary atherosclerosis    COPD exacerbation ([facility])    Restless leg syndrome    SIRS (systemic inflammatory response syndrome) ([facility])    Nausea and vomiting in adult    Bacterial UTI    Odynophagia    Esophageal dysphagia    Candida esophagitis ([facility])    Sepsis due to urinary tract infection (HCC)    Chronic respiratory failure with hypoxia (HCC)    Chronic diastolic congestive heart failure (HCC)    History of ESBL E. coli infection    Stage 4 chronic kidney disease (HCC)    Fever    Macrocytic anemia

## 2020-01-02 NOTE — PLAN OF CARE
Patient's pain and discomfort will decrease. Medicate for pain as ordered. Pt self reposition self for comfort. Will continue to monitor.     Problem: Falls - Risk of:  Goal: Will remain free from falls  Description  Will remain free from falls  Outcome: Ongoing     Problem: Pain:  Goal: Patient's pain/discomfort is manageable  Description  Patient's pain/discomfort is manageable  Outcome: Ongoing

## 2020-01-03 ENCOUNTER — CARE COORDINATION (OUTPATIENT)
Dept: CASE MANAGEMENT | Age: 75
End: 2020-01-03

## 2020-01-03 NOTE — CARE COORDINATION
[facility] Transitions Initial Follow Up Call- BPCI-A patient (UTI- 690)  (anchor stay - 12/10/19- UTI- )      Call within 2 business days of discharge: Yes    Patient: Love Contreras Patient : [id number]   MRN: [id number]  Reason for Admission: CP   Discharge Date: 20 RARS: Readmission Risk Score: 42      Last Discharge [facility]       Complaint Diagnosis Description Type Department Provider    19 Chest Pain Chest pain, unspecified type ED to Hosp-Admission (Discharged) (ADMITTED) RIC Lu MD; Delia White. .. Spoke with: Minal Jason     Call to pt who states she is doing a lot better  Denies CP or SOB. States has normal swelling in LE- states understanding to notify PCP or cardiology if this worsens or continues  States oxygen continues   Denies pain or burning when voiding  Confirms she has new med Imdur. She inquires what is it for- writer informs angina or chest pain prevention- v/u  Medications reviewed   States appt with Dr Gasper Santacruz 20, Dr Maryse Buchanan 20 at 1440 and Dr John Hernández (ID) 20  Denies needs  Encouraged to call writer/ CTC or providers if questions or concerns- v/u     Attempted call to PCP office- on hold for 5 minutes- still 4 people ahead of me in queue.     Facility: [facility]     Non-face-to-face services provided:  Scheduled appointment with PCP-20  Scheduled appointment with Specialist-20 (heme-onc), 20 ID  Obtained and reviewed discharge summary and/or continuity of care documents  Assessment and support for treatment adherence and medication management-medications reviewed     Care Transitions 24 Hour Call    Do you have any ongoing symptoms?:  No  Do you have a copy of your discharge instructions?:  Yes  Do you have all of your prescriptions and are they filled?:  Yes  Have you been contacted by a [facility] [place] Pharmacist?:  No  Have you scheduled your follow up appointment?:  Yes  How are you going to get to your appointment?:  Car - family or friend to transport  Were you discharged with any Home Care or Post Acute Services:  No  Post Acute Services:  Home Health (Comment:  [facility]/ [place])  Patient DME:  Edin Level cane, Straight cane, Shower chair, Other  Other Patient DME:  elevated toilet seat   Patient Home Equipment:  Nebulizer  Do you have support at home?:  Partner/Spouse/SO  Do you feel like you have everything you need to keep you well at home?:  Yes  Are you an active caregiver in your home?:  No  Care Transitions Interventions                                 Follow Up  Future Appointments   Date Time Provider Yue Qiu   1/9/2020  2:40 PM [facility] Austin [facility]   1/9/2020  3:00 PM ST[facility] STA CHAIR 16 [facility] ST[facility] [place]   1/13/2020  2:15 PM Andres Banks MD INFT DISEASE Mira Nix RN

## 2020-01-08 ENCOUNTER — HOSPITAL ENCOUNTER (OUTPATIENT)
Facility: MEDICAL CENTER | Age: 75
End: 2020-01-08
Payer: MEDICARE

## 2020-01-09 ENCOUNTER — HOSPITAL ENCOUNTER (OUTPATIENT)
Dept: INFUSION THERAPY | Facility: MEDICAL CENTER | Age: 75
Discharge: HOME | End: 2020-01-09
Payer: MEDICARE

## 2020-01-09 ENCOUNTER — HOSPITAL ENCOUNTER (OUTPATIENT)
Facility: MEDICAL CENTER | Age: 75
Discharge: HOME | End: 2020-01-09
Payer: MEDICARE

## 2020-01-09 ENCOUNTER — TELEPHONE (OUTPATIENT)
Dept: ONCOLOGY | Age: 75
End: 2020-01-09

## 2020-01-09 ENCOUNTER — OFFICE VISIT (OUTPATIENT)
Dept: ONCOLOGY | Age: 75
End: 2020-01-09
Payer: MEDICARE

## 2020-01-09 VITALS
SYSTOLIC BLOOD PRESSURE: 128 MMHG | HEART RATE: 58 BPM | TEMPERATURE: 98.7 F | DIASTOLIC BLOOD PRESSURE: 55 MMHG | RESPIRATION RATE: 24 BRPM

## 2020-01-09 DIAGNOSIS — D64.9 ANEMIA, UNSPECIFIED TYPE: ICD-10-CM

## 2020-01-09 DIAGNOSIS — K90.89 OTHER SPECIFIED INTESTINAL MALABSORPTION: Primary | ICD-10-CM

## 2020-01-09 DIAGNOSIS — N18.4 ANEMIA OF CHRONIC RENAL FAILURE, STAGE 4 (SEVERE) (HCC): ICD-10-CM

## 2020-01-09 DIAGNOSIS — E61.1 IRON DEFICIENCY: ICD-10-CM

## 2020-01-09 DIAGNOSIS — D63.1 ANEMIA OF CHRONIC RENAL FAILURE, STAGE 4 (SEVERE) (HCC): ICD-10-CM

## 2020-01-09 LAB
ABSOLUTE EOS #: 0.34 K/UL (ref 0–0.44)
ABSOLUTE IMMATURE GRANULOCYTE: 0.09 K/UL (ref 0–0.3)
ABSOLUTE LYMPH #: 1.17 K/UL (ref 1.1–3.7)
ABSOLUTE MONO #: 0.57 K/UL (ref 0.1–1.2)
BASOPHILS # BLD: 0 % (ref 0–2)
BASOPHILS ABSOLUTE: <0.03 K/UL (ref 0–0.2)
DIFFERENTIAL TYPE: ABNORMAL
EOSINOPHILS RELATIVE PERCENT: 5 % (ref 1–4)
HCT VFR BLD CALC: 31 % (ref 36.3–47.1)
HEMOGLOBIN: 9.2 G/DL (ref 11.9–15.1)
IMMATURE GRANULOCYTES: 1 %
LYMPHOCYTES # BLD: 18 % (ref 24–43)
MCH RBC QN AUTO: 31.7 PG (ref 25.2–33.5)
MCHC RBC AUTO-ENTMCNC: 29.7 G/DL (ref 28.4–34.8)
MCV RBC AUTO: 106.9 FL (ref 82.6–102.9)
MONOCYTES # BLD: 9 % (ref 3–12)
NRBC AUTOMATED: 0 PER 100 WBC
PDW BLD-RTO: 14.1 % (ref 11.8–14.4)
PLATELET # BLD: 110 K/UL (ref 138–453)
PLATELET ESTIMATE: ABNORMAL
PMV BLD AUTO: 10.7 FL (ref 8.1–13.5)
RBC # BLD: 2.9 M/UL (ref 3.95–5.11)
RBC # BLD: ABNORMAL 10*6/UL
SEG NEUTROPHILS: 67 % (ref 36–65)
SEGMENTED NEUTROPHILS ABSOLUTE COUNT: 4.49 K/UL (ref 1.5–8.1)
WBC # BLD: 6.7 K/UL (ref 3.5–11.3)
WBC # BLD: ABNORMAL 10*3/UL

## 2020-01-09 PROCEDURE — 1111F DSCHRG MED/CURRENT MED MERGE: CPT | Performed by: INTERNAL MEDICINE

## 2020-01-09 PROCEDURE — 96372 THER/PROPH/DIAG INJ SC/IM: CPT

## 2020-01-09 PROCEDURE — G8427 DOCREV CUR MEDS BY ELIG CLIN: HCPCS | Performed by: INTERNAL MEDICINE

## 2020-01-09 PROCEDURE — G8417 CALC BMI ABV UP PARAM F/U: HCPCS | Performed by: INTERNAL MEDICINE

## 2020-01-09 PROCEDURE — 36415 COLL VENOUS BLD VENIPUNCTURE: CPT

## 2020-01-09 PROCEDURE — 1036F TOBACCO NON-USER: CPT | Performed by: INTERNAL MEDICINE

## 2020-01-09 PROCEDURE — 6360000002 HC RX W HCPCS: Performed by: INTERNAL MEDICINE

## 2020-01-09 PROCEDURE — G8400 PT W/DXA NO RESULTS DOC: HCPCS | Performed by: INTERNAL MEDICINE

## 2020-01-09 PROCEDURE — 99211 OFF/OP EST MAY X REQ PHY/QHP: CPT | Performed by: INTERNAL MEDICINE

## 2020-01-09 PROCEDURE — 99214 OFFICE O/P EST MOD 30 MIN: CPT | Performed by: INTERNAL MEDICINE

## 2020-01-09 PROCEDURE — G8484 FLU IMMUNIZE NO ADMIN: HCPCS | Performed by: INTERNAL MEDICINE

## 2020-01-09 PROCEDURE — 85025 COMPLETE CBC W/AUTO DIFF WBC: CPT

## 2020-01-09 PROCEDURE — 3017F COLORECTAL CA SCREEN DOC REV: CPT | Performed by: INTERNAL MEDICINE

## 2020-01-09 PROCEDURE — 4040F PNEUMOC VAC/ADMIN/RCVD: CPT | Performed by: INTERNAL MEDICINE

## 2020-01-09 PROCEDURE — 1090F PRES/ABSN URINE INCON ASSESS: CPT | Performed by: INTERNAL MEDICINE

## 2020-01-09 PROCEDURE — 1123F ACP DISCUSS/DSCN MKR DOCD: CPT | Performed by: INTERNAL MEDICINE

## 2020-01-09 RX ORDER — CYANOCOBALAMIN 1000 UG/ML
1000 INJECTION INTRAMUSCULAR; SUBCUTANEOUS ONCE
Status: CANCELLED
Start: 2020-01-16

## 2020-01-09 RX ORDER — CYANOCOBALAMIN 1000 UG/ML
1000 INJECTION INTRAMUSCULAR; SUBCUTANEOUS ONCE
Status: COMPLETED
Start: 2020-01-09 | End: 2020-01-09

## 2020-01-09 RX ADMIN — DARBEPOETIN ALFA 200 MCG: 200 INJECTION, SOLUTION INTRAVENOUS; SUBCUTANEOUS at 15:21

## 2020-01-09 RX ADMIN — CYANOCOBALAMIN 1000 MCG: 1000 INJECTION, SOLUTION INTRAMUSCULAR at 15:23

## 2020-01-09 NOTE — TELEPHONE ENCOUNTER
KHOI ARRIVES VIA WALKER FOR MD VISIT & TX  DR PEDRO IN TO SEE PATIENT  ORDERS RECEIVED  CONTINUE ARANESP Q3 WEEKS FOR HB BELOW 10  RV 9 WEEKS  VITAMIN B12 & Renaldo Townsend MD VISIT 03/12/20 @11AM  [email or web address]  AVS PRINTED AND GIVEN TO PATIENT WITH INSTRUCTIONS  PATIENT DISCHARGED VIA WALKER

## 2020-01-09 NOTE — PROGRESS NOTES
_           Chief Complaint   Patient presents with    Follow-up     review status of disease     DIAGNOSIS:       Previous labs with Macrocytic anemia. Previous Evidence of Iron deficiency. History of thrombocytopenia. Repeated labs today with normocytic anemia. Anemia of chronic renal insufficiency stage IV. Multiple co-morbidities as listed. CURRENT THERAPY:     IV Iron infusion May 2018. Started Aranesp July 2018 for Hb below 10. BRIEF CASE HISTORY:      Ms. Priscila Fraire is a very pleasant 69 y.o. female with history of multiple co-morbidities as listed. She has increasing weakness and fatigue. she presented to the hospital with what she thought was vaginal bleeding and possible hematuria. She was evaluated by GYN and urology. Cystoscopy was done. She had urethral lesion. She was noted to have severe anemia. She denies active bleeding at the present time. No hematemesis or melena. No hematochezia. Patient denies chest pain or palpitation. No SOB. She uses a wheelchair. INTERIM HISTORY:   The patient is seen for follow up anemia. She received Iron infusion in May 2018. She is maintained on Aranesp every 3 weeks for hemoglobin below 10. . Tolerated well. No side effects. She C/O weakness and fatigue. No active bleeding. She had recent diagnosis of Parkinson disease. She is on treatment. No CP. No headache or dizziness. No weight loss. No active bleeding.      PAST MEDICAL HISTORY: has a past medical history of Acute on chronic diastolic congestive heart failure ([address] Anesthesia complication, Asthma, Atrial fibrillation ([address] Cataracts, bilateral, Cellulitis, Cerebral artery occlusion with cerebral infarction [facility]), CHF (congestive heart failure) ([address] Chronic kidney disease, Chronic kidney disease, Closed fracture of proximal end of right humerus with routine healing, Constipation, melatonin, calcitriol, cyclobenzaprine, furosemide, atorvastatin, carbidopa-levodopa, ropinirole, onetouch verio, pantoprazole, linagliptin, one touch ultra test, amiodarone, magnesium oxide, senna, aspirin, ferrous sulfate, rasagiline mesylate, and oxygen concentrator. ALLERGIES:  is allergic to dye [barium-containing compounds]; pcn [penicillins]; and bactrim [sulfamethoxazole-trimethoprim]. FAMILY HISTORY: Negative for any hematological or oncological conditions. SOCIAL HISTORY:  reports that she quit smoking about 49 years ago. Her smoking use included cigarettes. She has a 30.00 pack-year smoking history. She has never used smokeless tobacco. She reports current alcohol use of about 2.0 standard drinks of alcohol per week. She reports that she does not use drugs. REVIEW OF SYSTEMS:     · General: + weakness + fatigue. No unanticipated weight loss or decreased appetite. No fever or chills. · Eyes: No blurred vision, eye pain or double vision. · Ears: No hearing problems or drainage. No tinnitus. · Throat: No sore throat, problems with swallowing or dysphagia. · Respiratory: No cough, sputum or hemoptysis. No shortness of breath. No pleuritic chest pain. · Cardiovascular: No chest pain, orthopnea or PND. No lower extremity edema. No palpitation. · Gastrointestinal: No problems with swallowing. No abdominal pain or bloating. No nausea or vomiting. No diarrhea or constipation. No GI bleeding. · Genitourinary: No dysuria, hematuria, frequency or urgency. · Musculoskeletal: ++ limitation of movement. ++ gait disturbance, using wheelchair. · Dermatologic: No skin rashes or pruritus. No skin lesions or discolorations. · Psychiatric: No depression, anxiety, or stress or signs of schizophrenia. No change in mood or affect. · Hematologic: No history of bleeding tendency. No bruises or ecchymosis. No history of clotting problems.   · Infectious disease: No fever, chills or frequent infections. · Endocrine: No problems with opacity. No polydipsia or polyuria. No temperature intolerance. · Neurologic: No headaches or dizziness. No weakness or numbness of the extremities. No changes in balance, coordination,  memory, mentation, behavior. · Allergic/Immunologic: No nasal congestion or hives. No repeated infections. PHYSICAL EXAM: poor performance with wheelchair use. The patient is not in acute distress. Vital signs: Blood pressure (!) 128/55, pulse 58, temperature 98.7 °F (37.1 °C), temperature source Temporal, resp. rate 24, last menstrual period 04/03/2004, not currently breastfeeding. HEENT:  Eyes are normal. Ears, nose and throat are normal.  Neck: Supple. No lymph node enlargement. No thyroid enlargement. Trachea is centrally located. Chest:  Clear to auscultation. No wheezes or crepitations. Heart: Regular sinus rhythm. Abdomen: Soft, nontender. No hepatosplenomegaly. No masses. Extremities:  With no edema. Lymph Nodes:  No cervical, axillary or inguinal lymph node enlargement. Neurologic:  Conscious and oriented. No focal neurological deficits. Psychosocial: No depression, anxiety or stress. Skin: No rashes, bruises or ecchymoses.       Review of Diagnostic data:   Lab Results   Component Value Date    WBC 6.7 01/09/2020    HGB 9.2 (L) 01/09/2020    HCT 31.0 (L) 01/09/2020    .9 (H) 01/09/2020     (L) 01/09/2020    LYMPHOPCT 18 (L) 01/09/2020    RBC 2.90 (L) 01/09/2020    MCH 31.7 01/09/2020    MCHC 29.7 01/09/2020    RDW 14.1 01/09/2020     Lab Results   Component Value Date    IRON 31 (L) 12/31/2019    TIBC 218 (L) 12/31/2019    FERRITIN 226 (H) 09/01/2019       Chemistry        Component Value Date/Time     01/02/2020 0623    K 4.5 01/02/2020 0623    CL 92 (L) 01/02/2020 0623    CO2 36 (H) 01/02/2020 0623    BUN 37 (H) 01/02/2020 0623    CREATININE 2.02 (H) 01/02/2020 0623        Component Value Date/Time    CALCIUM 9.4 01/02/2020 0696

## 2020-01-10 ENCOUNTER — CARE COORDINATION (OUTPATIENT)
Dept: CASE MANAGEMENT | Age: 75
End: 2020-01-10

## 2020-01-13 ENCOUNTER — OFFICE VISIT (OUTPATIENT)
Dept: INFECTIOUS DISEASES | Age: 75
End: 2020-01-13
Payer: MEDICARE

## 2020-01-13 VITALS
BODY MASS INDEX: 38.62 KG/M2 | SYSTOLIC BLOOD PRESSURE: 130 MMHG | HEART RATE: 76 BPM | WEIGHT: 179 LBS | DIASTOLIC BLOOD PRESSURE: 50 MMHG | HEIGHT: 57 IN | TEMPERATURE: 97 F

## 2020-01-13 PROCEDURE — 1036F TOBACCO NON-USER: CPT | Performed by: INTERNAL MEDICINE

## 2020-01-13 PROCEDURE — G8484 FLU IMMUNIZE NO ADMIN: HCPCS | Performed by: INTERNAL MEDICINE

## 2020-01-13 PROCEDURE — 1090F PRES/ABSN URINE INCON ASSESS: CPT | Performed by: INTERNAL MEDICINE

## 2020-01-13 PROCEDURE — G8417 CALC BMI ABV UP PARAM F/U: HCPCS | Performed by: INTERNAL MEDICINE

## 2020-01-13 PROCEDURE — 1123F ACP DISCUSS/DSCN MKR DOCD: CPT | Performed by: INTERNAL MEDICINE

## 2020-01-13 PROCEDURE — 3017F COLORECTAL CA SCREEN DOC REV: CPT | Performed by: INTERNAL MEDICINE

## 2020-01-13 PROCEDURE — G8400 PT W/DXA NO RESULTS DOC: HCPCS | Performed by: INTERNAL MEDICINE

## 2020-01-13 PROCEDURE — 4040F PNEUMOC VAC/ADMIN/RCVD: CPT | Performed by: INTERNAL MEDICINE

## 2020-01-13 PROCEDURE — 99214 OFFICE O/P EST MOD 30 MIN: CPT | Performed by: INTERNAL MEDICINE

## 2020-01-13 PROCEDURE — G8427 DOCREV CUR MEDS BY ELIG CLIN: HCPCS | Performed by: INTERNAL MEDICINE

## 2020-01-13 RX ORDER — GRANULES FOR ORAL 3 G/1
3 POWDER ORAL ONCE
Qty: 1 EACH | Refills: 0 | Status: SHIPPED | OUTPATIENT
Start: 2020-01-13 | End: 2020-01-13

## 2020-01-13 ASSESSMENT — ENCOUNTER SYMPTOMS
EYE DISCHARGE: 1
RESPIRATORY NEGATIVE: 1
GASTROINTESTINAL NEGATIVE: 1

## 2020-01-13 NOTE — PROGRESS NOTES
is on vitamin B12 and Iron shots and overall that she is feeling better  He does still report some dysuria, and she also continues to report some issues with her eye and reports \"dripping\" from the eyes even at night. She states he was seen by her ophthalmologist and that no changes were made to her medications. She does not report any significant cough or shortness of breath at this time. She continues on home O2. I have personally reviewedthe past medical history, medications, social history, and I have updated the database accordingly. Past Medical History:     Past Medical History:   Diagnosis Date    Acute on chronic diastolic congestive heart failure ([facility]     Anesthesia complication     DIFFICULTY WAKING OP    Asthma     Atrial fibrillation ([facility] 2013    Cataracts, bilateral     Cellulitis     BILAT LOWER LEGS-PT STATES IS IMPROVING    Cerebral artery occlusion with cerebral infarction [facility])     Last stroke was February 2017 w/no deficits-TOTAL OF 3    CHF (congestive heart failure) (HCC)     Chronic kidney disease 2013    NOT ON DIALYSIS YET    Chronic kidney disease     Closed fracture of proximal end of right humerus with routine healing     Constipation     CHRONIC    Controlled type 2 diabetes mellitus with stage 3 chronic kidney disease, without long-term current use of insulin (HCC)     COPD (chronic obstructive pulmonary disease) ([facility] 2008    PT. SEES DR. BECK. Home O2 at 2-3L/NC 24/7.     Difficult intravenous access     VEINS ROLL    Difficulty walking     AMBULATES WITH THERAPPY    Diverticulosis     DM2 (diabetes mellitus, type 2) ([facility] 2008    Full dentures     FULL UPPER ONLY    GERD (gastroesophageal reflux disease) 2008    ON RX    H/O fall     Headache(784.0)     Kaw (hard of hearing)     HAS HEARING AIDS BUT DOES NOT WEAR    Hyperlipidemia     Hyperplastic polyp of intestine     Hypertension 2008    Impaired ambulation     USES TRANFER CHAIR WALKER OR CANE    Kidney stone 2018    PRESENTLY-SMALL    Living in nursing home     [address] [facility] IS HER OWN LEGAL GUARDIAN    Morbid obesity with BMI of 40.0-44.9, adult ([facility] 12/30/2016    Muscle weakness     Nephrolithiasis 3/8/2018    Open wound     COCCYX    ECTOR on CPAP 2008    USES C-PAP NIGHTLY    Osteoarthritis     OSTEOARTHRITIS    Parkinson disease ([facility] 2015    Restless leg syndrome 2013    MILD    Spinal stenosis in cervical region 6/2/2013    Type II or unspecified type diabetes mellitus without mention of complication, not stated as uncontrolled     Urethral caruncle 3/8/2018    Wears glasses     Wears glasses      Medications:     Current Outpatient Medications   Medication Sig Dispense Refill    fosfomycin tromethamine (MONUROL) 3 g PACK Take 1 packet by mouth once for 1 dose 1 each 0    isosorbide mononitrate (IMDUR) 30 MG extended release tablet Take 1 tablet by mouth daily 30 tablet 0    apixaban (ELIQUIS) 2.5 MG TABS tablet Take 1 tablet by mouth 2 times daily 60 tablet 0    darbepoetin albaro-polysorbate (ARANESP) 200 MCG/0.4ML SOSY injection Inject 200 mcg into the skin every 28 days      fluticasone-vilanterol (BREO ELLIPTA) 100-25 MCG/INH AEPB inhaler Inhale 2 puffs into the lungs daily      traZODone (DESYREL) 50 MG tablet Take 50 mg by mouth nightly      albuterol (PROVENTIL) (2.5 MG/3ML) 0.083% nebulizer solution Take 3 mLs by nebulization 4 times daily 120 each 3    metoprolol tartrate (LOPRESSOR) 25 MG tablet Take 1 tablet by mouth 2 times daily 60 tablet 3    miconazole (MICOTIN) 2 % powder Apply topically 2 times daily.  45 g 1    calcium acetate (PHOSLO) 667 MG capsule Take 1 capsule by mouth 3 times daily (with meals) 60 capsule 3    cyanocobalamin 1000 MCG/ML injection Inject 1,000 mcg into the muscle every 30 days      melatonin 5 MG TABS tablet Take 5 mg by mouth nightly as needed (sleep)      calcitRIOL (ROCALTROL) 0.25 MCG capsule Take 0.25 mcg by mouth daily      cyclobenzaprine (FLEXERIL) 5 MG tablet Take 5 mg by mouth 3 times daily as needed for Muscle spasms      furosemide (LASIX) 20 MG tablet Take 1 tablet by mouth daily 60 tablet 3    atorvastatin (LIPITOR) 20 MG tablet TAKE 1 TABLET DAILY 90 tablet 2    carbidopa-levodopa (SINEMET CR)  MG per extended release tablet Take 1 tablet by mouth 4 times daily 360 tablet 3    rOPINIRole (REQUIP) 2 MG tablet Take 1 tablet by mouth 3 times daily 270 tablet 3    ONETOUCH VERIO strip 1 each by In Vitro route daily As needed. 100 each 3    pantoprazole (PROTONIX) 40 MG tablet TAKE 1 TABLET TWICE A DAY BEFORE MEALS 180 tablet 3    linagliptin (TRADJENTA) 5 MG tablet Take 0.5 tablets by mouth daily 90 tablet 1    ONE TOUCH ULTRA TEST strip USE 1 STRIP THREE TIMES A  each 3    amiodarone (CORDARONE) 200 MG tablet Take 200 mg by mouth daily       magnesium oxide (MAG-OX) 400 (241.3 Mg) MG TABS tablet Take 1 tablet by mouth 2 times daily 30 tablet     senna (SENOKOT) 8.6 MG tablet Take 2 tablets by mouth nightly       aspirin 81 MG tablet Take 81 mg by mouth daily       ferrous sulfate 325 (65 Fe) MG EC tablet Take 1 tablet by mouth 2 times daily (with meals) 90 tablet 3    rasagiline mesylate 1 MG TABS Take 1 tablet by mouth daily      Oxygen Concentrator Dx: Pneumonia, use as directed. (Patient taking differently: Dx: Pneumonia, use as directed. ON 24/7) 1 each 0     No current facility-administered medications for this visit. Allergies:   Dye [barium-containing compounds]; Pcn [penicillins]; and Bactrim [sulfamethoxazole-trimethoprim]     Review of Systems:   Review of Systems   Constitutional: Negative. Eyes: Positive for discharge. Respiratory: Negative. Cardiovascular: Negative. Gastrointestinal: Negative. Genitourinary: Positive for dysuria. Musculoskeletal:        Right leg pain   Skin: Negative. Neurological: Negative.     Psychiatric/Behavioral: BILITOT 0.12 12/06/2019    ALKPHOS 57 12/31/2019    ALKPHOS 46 12/06/2019    ALT 6 12/31/2019    ALT <5 12/06/2019    AST 10 12/31/2019    AST 8 12/06/2019       Lab Results   Component Value Date    CRP 3.1 06/12/2013     Lab Results   Component Value Date    SEDRATE 78 (H) 08/24/2018         Thank you for allowing us to participate in the care of this patient. Pleasecall with questions. Catrina Cohen MD  Perfect Serve messaging: [phone]    This note is created with the assistance of a speech recognition program.  While intending to generate a document that actually reflects the content ofthe visit, the document can still have some errors including those of syntax and sound a like substitutions which may escape proof reading. It such instances, actual meaning can be extrapolated by contextual diversion.

## 2020-01-14 ENCOUNTER — TELEPHONE (OUTPATIENT)
Dept: INFECTIOUS DISEASES | Age: 75
End: 2020-01-14

## 2020-01-14 NOTE — TELEPHONE ENCOUNTER
Received a notice that a PA is needed on Monurol.   This has been filled out and faxed back to insurance along with dictation

## 2020-01-15 NOTE — TELEPHONE ENCOUNTER
FW: Rx Prior Auth Response   Received: Today   Message Contents   [address] Mccabe MA          Previous Messages         Denied     The drug you asked for is non-formulary (not on [employer] list of preferred drugs). Before the drug can be covered, we need more information. Please ask your prescriber to explain to [facility] why the preferred drugs have not worked for your medical condition and/or would have bad side effects. The list of drugs offered by your [facility] plan can be found in your Prescription Drug Guide (PDG). You can view your PDG online at [employer] or request a printed list be mailed to you from this website or by calling customer care at [phone] (ZRH: 118), 8 a.m. 8 p.m., seven days a week. If you have not tried the preferred drugs, including ciprofloxacin tablet, levofloxacin tablet, and nitrofurantoin macrocrystal capsule, please talk to your health care provider about prescribing one of these for you. Some preferred drugs may require an additional review and approval by [facility]. This determination was based on the [address] and [employer] Non-Formulary Exceptions Coverage Policy. Case ID: [id number]      Payer:  [employer] - [employer]    [phone]    Electronic appeal:  Not supported   View History         Was denied with covermymeds but I had to fax a form and records to [facility] yesterday so I am waiting on that reply.

## 2020-01-22 ENCOUNTER — CARE COORDINATION (OUTPATIENT)
Dept: CASE MANAGEMENT | Age: 75
End: 2020-01-22

## 2020-01-28 ENCOUNTER — HOSPITAL ENCOUNTER (OUTPATIENT)
Facility: MEDICAL CENTER | Age: 75
End: 2020-01-28
Payer: MEDICARE

## 2020-01-29 ENCOUNTER — TELEPHONE (OUTPATIENT)
Dept: INFECTIOUS DISEASES | Age: 75
End: 2020-01-29

## 2020-01-29 NOTE — TELEPHONE ENCOUNTER
Roland Sheldon. Reg:  Sky Thurman  Female, 74 yrs, 1945, ,   MRN:   [id number]  I called patient herself to let her know she is colonized. She states she is NOW having burning, and fowl smelling urine. Let me know what you would like me to do. Thanks, Lashell Read 1/29/2020 10:43 AM 1/29/2020 10:44 AM   Fosfomycin I packet - Please call that into her pharmacy 1/29/2020 11:10 AM will do. Thanks! Unread   Spoke to Tarah at [facility] per patient this is where she wanted RX to go. They have in stock and will fill today. I called patient to inform.

## 2020-01-31 ENCOUNTER — CARE COORDINATION (OUTPATIENT)
Dept: CASE MANAGEMENT | Age: 75
End: 2020-01-31

## 2020-02-10 ENCOUNTER — CARE COORDINATION (OUTPATIENT)
Dept: CASE MANAGEMENT | Age: 75
End: 2020-02-10

## 2020-02-10 NOTE — CARE COORDINATION
[facility] Transitions Follow Up Call    2/10/2020    Patient: Mitchell Homans  Patient : 1945   MRN: <[id number]>  Reason for Admission: UTI  Discharge Date: 20 RARS: Readmission Risk Score: 43         Spoke with: Patient    Patient states she is having pain and burning upon urination. Urine yellow. Doctor ordered new antibiotic to start this week. Instructed patient to finish all of the medications. Patient expresses understanding. Patient states knows when to contact physician or report to ED with worsening or severe symptoms, changes, or concerns. Will Follow up at later time. Care Transitions Subsequent and Final Call    Subsequent and Final Calls  Do you have any ongoing symptoms?:  Yes  Patient-reported symptoms:  Pain  Have your medications changed?:  No  Do you have any questions related to your medications?:  No  Do you currently have any active services?:  No  Are you currently active with any services?:  Home Health  Do you have any needs or concerns that I can assist you with?:  No  Care Transitions Interventions                          Other Interventions:             Follow Up  Future Appointments   Date Time Provider Yue Qiu   2020  2:30 PM [facility] [place]   3/12/2020 11:00 AM Solitario Stubbs MD [id number]   3/12/2020 11:30 AM [facility] ST[facility] CHA[facility] 18 [facility] [place]   2020 11:00 AM Biju Cisse MD INFT DISEASE [facility] Lazar LPN

## 2020-02-11 ENCOUNTER — HOSPITAL ENCOUNTER (OUTPATIENT)
Facility: MEDICAL CENTER | Age: 75
End: 2020-02-11
Payer: MEDICARE

## 2020-02-12 ENCOUNTER — HOSPITAL ENCOUNTER (OUTPATIENT)
Dept: INFUSION THERAPY | Facility: MEDICAL CENTER | Age: 75
Discharge: HOME | End: 2020-02-12
Payer: MEDICARE

## 2020-02-12 ENCOUNTER — HOSPITAL ENCOUNTER (EMERGENCY)
Age: 75
Discharge: HOME | End: 2020-02-12
Attending: EMERGENCY MEDICINE
Payer: MEDICARE

## 2020-02-12 ENCOUNTER — APPOINTMENT (OUTPATIENT)
Dept: CT IMAGING | Age: 75
End: 2020-02-12
Payer: MEDICARE

## 2020-02-12 VITALS
DIASTOLIC BLOOD PRESSURE: 47 MMHG | HEART RATE: 75 BPM | TEMPERATURE: 97.6 F | SYSTOLIC BLOOD PRESSURE: 112 MMHG | RESPIRATION RATE: 22 BRPM

## 2020-02-12 VITALS
WEIGHT: 178 LBS | SYSTOLIC BLOOD PRESSURE: 154 MMHG | HEIGHT: 57 IN | OXYGEN SATURATION: 94 % | RESPIRATION RATE: 20 BRPM | HEART RATE: 65 BPM | DIASTOLIC BLOOD PRESSURE: 68 MMHG | TEMPERATURE: 98.1 F | BODY MASS INDEX: 38.4 KG/M2

## 2020-02-12 DIAGNOSIS — K90.89 OTHER SPECIFIED INTESTINAL MALABSORPTION: Primary | ICD-10-CM

## 2020-02-12 DIAGNOSIS — E61.1 IRON DEFICIENCY: ICD-10-CM

## 2020-02-12 DIAGNOSIS — N18.4 ANEMIA OF CHRONIC RENAL FAILURE, STAGE 4 (SEVERE) (HCC): ICD-10-CM

## 2020-02-12 DIAGNOSIS — D63.1 ANEMIA OF CHRONIC RENAL FAILURE, STAGE 4 (SEVERE) (HCC): ICD-10-CM

## 2020-02-12 LAB
-: NORMAL
ABSOLUTE EOS #: 0.15 K/UL (ref 0–0.44)
ABSOLUTE IMMATURE GRANULOCYTE: 0.03 K/UL (ref 0–0.3)
ABSOLUTE LYMPH #: 1.29 K/UL (ref 1.1–3.7)
ABSOLUTE MONO #: 0.44 K/UL (ref 0.1–1.2)
ANION GAP SERPL CALCULATED.3IONS-SCNC: 10 MMOL/L (ref 9–17)
BASOPHILS # BLD: 0 % (ref 0–2)
BASOPHILS ABSOLUTE: 0.03 K/UL (ref 0–0.2)
BILIRUBIN, POC: NEGATIVE
BLOOD URINE, POC: NORMAL
BUN BLDV-MCNC: 31 MG/DL (ref 8–23)
BUN/CREAT BLD: 17 (ref 9–20)
CALCIUM SERPL-MCNC: 8.9 MG/DL (ref 8.6–10.4)
CHLORIDE BLD-SCNC: 97 MMOL/L (ref 98–107)
CHP ED QC CHECK: NORMAL
CLARITY, POC: NORMAL
CO2: 34 MMOL/L (ref 20–31)
COLOR, POC: NORMAL
CREAT SERPL-MCNC: 1.8 MG/DL (ref 0.5–0.9)
DIFFERENTIAL TYPE: ABNORMAL
EOSINOPHILS RELATIVE PERCENT: 2 % (ref 1–4)
GFR AFRICAN AMERICAN: 34 ML/MIN
GFR NON-AFRICAN AMERICAN: 28 ML/MIN
GFR SERPL CREATININE-BSD FRML MDRD: ABNORMAL ML/MIN/{1.73_M2}
GFR SERPL CREATININE-BSD FRML MDRD: ABNORMAL ML/MIN/{1.73_M2}
GLUCOSE BLD-MCNC: 115 MG/DL (ref 70–99)
GLUCOSE URINE, POC: NEGATIVE
HCT VFR BLD CALC: 32.1 % (ref 36.3–47.1)
HEMOGLOBIN: 9.4 G/DL (ref 11.9–15.1)
IMMATURE GRANULOCYTES: 0 %
KETONES, POC: NEGATIVE
LEUKOCYTE EST, POC: NORMAL
LYMPHOCYTES # BLD: 17 % (ref 24–43)
MCH RBC QN AUTO: 31.9 PG (ref 25.2–33.5)
MCHC RBC AUTO-ENTMCNC: 29.3 G/DL (ref 28.4–34.8)
MCV RBC AUTO: 108.8 FL (ref 82.6–102.9)
MONOCYTES # BLD: 6 % (ref 3–12)
NITRITE, POC: NEGATIVE
NRBC AUTOMATED: ABNORMAL PER 100 WBC
PDW BLD-RTO: 13.9 % (ref 11.8–14.4)
PH, POC: 5.5
PLATELET # BLD: 93 K/UL (ref 138–453)
PLATELET ESTIMATE: ABNORMAL
PMV BLD AUTO: 10.2 FL (ref 8.1–13.5)
POTASSIUM SERPL-SCNC: 4.1 MMOL/L (ref 3.7–5.3)
PROTEIN, POC: 30
RBC # BLD: 2.95 M/UL (ref 3.95–5.11)
RBC # BLD: ABNORMAL 10*6/UL
REASON FOR REJECTION: NORMAL
SEG NEUTROPHILS: 74 % (ref 36–65)
SEGMENTED NEUTROPHILS ABSOLUTE COUNT: 5.61 K/UL (ref 1.5–8.1)
SODIUM BLD-SCNC: 141 MMOL/L (ref 135–144)
SPECIFIC GRAVITY, POC: 1.01
UROBILINOGEN, POC: 0.2
WBC # BLD: 7.6 K/UL (ref 3.5–11.3)
WBC # BLD: ABNORMAL 10*3/UL
ZZ NTE CLEAN UP: ORDERED TEST: NORMAL
ZZ NTE WITH NAME CLEAN UP: SPECIMEN SOURCE: NORMAL

## 2020-02-12 PROCEDURE — 6360000002 HC RX W HCPCS: Performed by: INTERNAL MEDICINE

## 2020-02-12 PROCEDURE — 96372 THER/PROPH/DIAG INJ SC/IM: CPT

## 2020-02-12 PROCEDURE — 80048 BASIC METABOLIC PNL TOTAL CA: CPT

## 2020-02-12 PROCEDURE — 99284 EMERGENCY DEPT VISIT MOD MDM: CPT

## 2020-02-12 PROCEDURE — 81003 URINALYSIS AUTO W/O SCOPE: CPT

## 2020-02-12 PROCEDURE — 74176 CT ABD & PELVIS W/O CONTRAST: CPT

## 2020-02-12 PROCEDURE — 85025 COMPLETE CBC W/AUTO DIFF WBC: CPT

## 2020-02-12 RX ORDER — CYANOCOBALAMIN 1000 UG/ML
1000 INJECTION INTRAMUSCULAR; SUBCUTANEOUS ONCE
Status: CANCELLED
Start: 2020-02-19

## 2020-02-12 RX ORDER — CYANOCOBALAMIN 1000 UG/ML
1000 INJECTION INTRAMUSCULAR; SUBCUTANEOUS ONCE
Status: COMPLETED
Start: 2020-02-12 | End: 2020-02-12

## 2020-02-12 RX ADMIN — DARBEPOETIN ALFA 200 MCG: 200 INJECTION, SOLUTION INTRAVENOUS; SUBCUTANEOUS at 15:05

## 2020-02-12 RX ADMIN — CYANOCOBALAMIN 1000 MCG: 1000 INJECTION, SOLUTION INTRAMUSCULAR at 15:05

## 2020-02-12 ASSESSMENT — ENCOUNTER SYMPTOMS
COLOR CHANGE: 0
FACIAL SWELLING: 0
COUGH: 0
CONSTIPATION: 0
DIARRHEA: 0
VOMITING: 0
EYE REDNESS: 0
EYE DISCHARGE: 0
ABDOMINAL PAIN: 0
SHORTNESS OF BREATH: 1

## 2020-02-12 ASSESSMENT — PAIN DESCRIPTION - ORIENTATION: ORIENTATION: LEFT

## 2020-02-12 ASSESSMENT — PAIN SCALES - GENERAL: PAINLEVEL_OUTOF10: 9

## 2020-02-12 ASSESSMENT — PAIN DESCRIPTION - LOCATION: LOCATION: FLANK

## 2020-02-12 NOTE — ED PROVIDER NOTES
[address] [place] [address] ED  EMERGENCY DEPARTMENT ENCOUNTER      Pt Name: Carmen Beltran  MRN: [id number]  [place] 1945  Date of evaluation: 2/12/2020  Provider: Gómez Martines MD    CHIEF COMPLAINT       Chief Complaint   Patient presents with    Flank Pain         HISTORY OF PRESENT ILLNESS  (Location/Symptom, Timing/Onset, Context/Setting, Quality, Duration, Modifying Factors, Severity.)   Carmen Beltran is a 69 y.o. female who presents to the emergency department for left flank pain. She rates it as a 9 and its continuous. No injury. She thinks she might have diverticulitis but she is also had kidney stones in the past.  She is currently being treated for UTI. No fever or vomiting. She always has shortness of breath. It started a few days ago. Nursing Notes were reviewed.     ALLERGIES     Dye [barium-containing compounds]; Pcn [penicillins]; and Bactrim [sulfamethoxazole-trimethoprim]    CURRENT MEDICATIONS       Previous Medications    ALBUTEROL (PROVENTIL) (2.5 MG/3ML) 0.083% NEBULIZER SOLUTION    Take 3 mLs by nebulization 4 times daily    AMIODARONE (CORDARONE) 200 MG TABLET    Take 200 mg by mouth daily     APIXABAN (ELIQUIS) 2.5 MG TABS TABLET    Take 1 tablet by mouth 2 times daily    ASPIRIN 81 MG TABLET    Take 81 mg by mouth daily     ATORVASTATIN (LIPITOR) 20 MG TABLET    TAKE 1 TABLET DAILY    CALCITRIOL (ROCALTROL) 0.25 MCG CAPSULE    Take 0.25 mcg by mouth daily    CALCIUM ACETATE (PHOSLO) 667 MG CAPSULE    Take 1 capsule by mouth 3 times daily (with meals)    CARBIDOPA-LEVODOPA (SINEMET CR)  MG PER EXTENDED RELEASE TABLET    Take 1 tablet by mouth 4 times daily    CYANOCOBALAMIN 1000 MCG/ML INJECTION    Inject 1,000 mcg into the muscle every 30 days    CYCLOBENZAPRINE (FLEXERIL) 5 MG TABLET    Take 5 mg by mouth 3 times daily as needed for Muscle spasms    DARBEPOETIN GIOVANA-POLYSORBATE (ARANESP) 200 MCG/0.4ML SOSY INJECTION    Inject 200 mcg into the skin every 28 days    FERROUS SULFATE 325 (65 FE) MG EC TABLET    Take 1 tablet by mouth 2 times daily (with meals)    FLUTICASONE-VILANTEROL (BREO ELLIPTA) 100-25 MCG/INH AEPB INHALER    Inhale 2 puffs into the lungs daily    FUROSEMIDE (LASIX) 20 MG TABLET    Take 1 tablet by mouth daily    ISOSORBIDE MONONITRATE (IMDUR) 30 MG EXTENDED RELEASE TABLET    Take 1 tablet by mouth daily    LINAGLIPTIN (TRADJENTA) 5 MG TABLET    Take 0.5 tablets by mouth daily    MAGNESIUM OXIDE (MAG-OX) 400 (241.3 MG) MG TABS TABLET    Take 1 tablet by mouth 2 times daily    MELATONIN 5 MG TABS TABLET    Take 5 mg by mouth nightly as needed (sleep)    METOPROLOL TARTRATE (LOPRESSOR) 25 MG TABLET    Take 1 tablet by mouth 2 times daily    MICONAZOLE (MICOTIN) 2 % POWDER    Apply topically 2 times daily. ONE TOUCH ULTRA TEST STRIP    USE 1 STRIP THREE TIMES A DAY    ONETOUCH VERIO STRIP    1 each by In Vitro route daily As needed. OXYGEN CONCENTRATOR    Dx: Pneumonia, use as directed.     PANTOPRAZOLE (PROTONIX) 40 MG TABLET    TAKE 1 TABLET TWICE A DAY BEFORE MEALS    RASAGILINE MESYLATE 1 MG TABS    Take 1 tablet by mouth daily    ROPINIROLE (REQUIP) 2 MG TABLET    Take 1 tablet by mouth 3 times daily    SENNA (SENOKOT) 8.6 MG TABLET    Take 2 tablets by mouth nightly     TRAZODONE (DESYREL) 50 MG TABLET    Take 50 mg by mouth nightly       PAST MEDICAL HISTORY         Diagnosis Date    Acute on chronic diastolic congestive heart failure (HCC)     Anesthesia complication     DIFFICULTY WAKING OP    Asthma     Atrial fibrillation ([facility] [address] 2013    Cataracts, bilateral     Cellulitis     BILAT LOWER LEGS-PT STATES IS IMPROVING    Cerebral artery occlusion with cerebral infarction [facility])     Last stroke was February 2017 w/no deficits-TOTAL OF 3    CHF (congestive heart failure) (HCC)     Chronic kidney disease 2013    NOT ON DIALYSIS YET    Chronic kidney disease     Closed fracture of proximal end of right humerus with routine healing     Constipation CHRONIC    Controlled type 2 diabetes mellitus with stage 3 chronic kidney disease, without long-term current use of insulin (HCC)     COPD (chronic obstructive pulmonary disease) ([address] 2008    PT. SEES DR. BECK. Home O2 at 2-3L/NC 24/7.     Difficult intravenous access     VEINS ROLL    Difficulty walking     AMBULATES WITH THERAPPY    Diverticulosis     DM2 (diabetes mellitus, type 2) ([address] 2008    Full dentures     FULL UPPER ONLY    GERD (gastroesophageal reflux disease) 2008    ON RX    H/O fall     Headache(784.0)     [place] (hard of hearing)     HAS HEARING AIDS BUT DOES NOT WEAR    Hyperlipidemia     Hyperplastic polyp of intestine     Hypertension 2008    Impaired ambulation     USES TRANFER CHAIR WALKER OR CANE    Kidney stone 2018    PRESENTLY-SMALL    Living in nursing home     [facility] Morbid obesity with BMI of 40.0-44.9, adult ([address] 12/30/2016    Muscle weakness     Nephrolithiasis 3/8/2018    Open wound     COCCYX    ECTOR on CPAP 2008    USES C-PAP NIGHTLY    Osteoarthritis     OSTEOARTHRITIS    Parkinson disease ([address] 2015    Restless leg syndrome 2013    MILD    Spinal stenosis in cervical region 6/2/2013    Type II or unspecified type diabetes mellitus without mention of complication, not stated as uncontrolled     Urethral caruncle 3/8/2018    Wears glasses     Wears glasses        SURGICAL HISTORY           Procedure Laterality Date    APPENDECTOMY      CERVICAL [address] SURGERY  2012    CERVICAL SPINE SURGERY  5/31/13    posterior c5-t1    COLONOSCOPY  2009    COLONOSCOPY  12/29/2016    incomplete was not cleaned out    COLONOSCOPY  12/30/2016    COLONOSCOPY  04/25/2017     SIGMOID COLON POLYPECTOMY:  HYPERPLASTIC POLYP ,   DIVERTICULOSIS    CYSTORRHAPHY  04/04/2018    EYE SURGERY Bilateral 2017    CATARACTS W/ IOL    HARDWARE REMOVAL Right 07/05/2019    HARDWARE REMOVAL PINS  HUMERUS     HARDWARE REMOVAL Gastrointestinal: Negative for abdominal pain, constipation, diarrhea and vomiting. Genitourinary: Positive for flank pain. Negative for dysuria and hematuria. Musculoskeletal: Negative for arthralgias. Skin: Negative for color change and rash. Neurological: Negative for syncope, numbness and headaches. Hematological: Negative for adenopathy. Psychiatric/Behavioral: Negative for confusion. The patient is not nervous/anxious. Except as noted above the remainder of the review of systems was reviewed and negative. PHYSICAL EXAM    (up to 7 for level 4, 8 or more for level 5)     Vitals:    02/12/20 0617   BP: (!) 154/68   Pulse: 65   Resp: 20   Temp: 98.1 °F (36.7 °C)   TempSrc: Oral   SpO2: 94%   Weight: 178 lb (80.7 kg)   Height: 4' 9\" (1.448 m)       Physical Exam  Vitals signs reviewed. Constitutional:       General: She is not in acute distress. Appearance: She is well-developed. She is not diaphoretic. HENT:      Head: Normocephalic and atraumatic. Eyes:      General: No scleral icterus. Right eye: No discharge. Left eye: No discharge. Neck:      Musculoskeletal: Neck supple. Cardiovascular:      Rate and Rhythm: Normal rate and regular rhythm. Pulmonary:      Effort: Pulmonary effort is normal. No respiratory distress. Breath sounds: No stridor. Wheezing present. No rales. Abdominal:      General: There is no distension. Palpations: Abdomen is soft. Tenderness: There is abdominal tenderness. Comments: Minimal tenderness on the left side of her abdomen. Genitourinary:     Comments: She seems to have tenderness in the left flank area with light palpation. There is no bruise or rash. Musculoskeletal: Normal range of motion. Lymphadenopathy:      Cervical: No cervical adenopathy. Skin:     General: Skin is warm and dry. Findings: No erythema or rash.    Neurological:      Mental Status: She is alert and oriented to person, place, and time. Psychiatric:         Behavior: Behavior normal.             DIAGNOSTIC RESULTS     EKG: All EKG's are interpreted by the Emergency Department Physician who either signs or Co-signs this chart in the absence of a cardiologist.    RADIOLOGY:   Non-plain film images such as CT, Ultrasound and MRI are read by the radiologist. Plain radiographic images are visualized and preliminarily interpreted by the emergency physician with the below findings:    Interpretation per the Radiologist below, if available at the time of this note:        LABS:  Labs Reviewed   CBC WITH AUTO DIFFERENTIAL   BASIC METABOLIC PANEL   POCT URINALYSIS DIPSTICK       All other labs were within normal range or not returned as of this dictation. EMERGENCY DEPARTMENT COURSE and DIFFERENTIAL DIAGNOSIS/MDM:   Vitals:    Vitals:    02/12/20 0617   BP: (!) 154/68   Pulse: 65   Resp: 20   Temp: 98.1 °F (36.7 °C)   TempSrc: Oral   SpO2: 94%   Weight: 178 lb (80.7 kg)   Height: 4' 9\" (1.448 m)       No orders of the defined types were placed in this encounter. Medical Decision Making: Tests are ordered and the patient is signed out to Dr. Lizzy Delacruz.         (Please note that portions of this note were completed with a voice recognition program.  Efforts were made to edit the dictations but occasionally words are mis-transcribed.)    Emmett Soni MD  Attending Emergency Physician           Emmett Soni MD  02/12/20 6922

## 2020-02-12 NOTE — ED NOTES
Patient presents to ED with c/o left sided flank pain that has been ongoing for 1 week. 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 Unable to recall antibiotic that she's been taking. Patient also reports c/o chills and nausea.       Sue Oviedo RN  02/12/20 0138

## 2020-02-12 NOTE — ED PROVIDER NOTES
EMERGENCY DEPARTMENT ENCOUNTER    Pt Name: Priscila Fraire  MRN: [id number]  [place] 1945  Date of evaluation: 2/12/20  CHIEF COMPLAINT       Chief Complaint   Patient presents with    Flank Pain         MEDICAL DECISION MAKING:   I took over care of patient from Dr. Gianna Lyons. Labs remarkable for elevated creatinine 1.8 likely prerenal in the setting of poor hydration. CT scan shows nonobstructing stones in pelvis of kidneys bilaterally no obstructing stones. Also moderate stool and diverticulosis without evidence of diverticulitis. Advised to increase fiber in diet. Also they have stool softeners at home that they will restart. ED Course as of Feb 12 0847   Wed Feb 12, 2020   6041 The patient is hemodynamically stable, afebrile, nontoxic-appearing. Labs remarkable for creatinine 1.8, BUN 31, WBC 7.6. UA with RBCs. Follow-up CT scan shows    [RENUKA]   5327 Follow-up CT scan shows nonobstructing renal stones and pelvis, moderate stool, reticulosis without evidence of diverticulitis. Creatinine elevated likely in the setting of poor hydration. Patient denies increased fiber in diet. Stable and ready for discharge    [RENUKA]      ED Course User Index  [RENUKA] Samuel Valencia MD         DIAGNOSTIC RESULTS   EKG:All EKG's are interpreted by the Emergency Department Physician who either signs or Co-signs this chart in the absence of a cardiologist.        RADIOLOGY:All plain film, CT, MRI, and formal ultrasound images (except ED bedside ultrasound) are read by the radiologist, see reports below, unless otherwisenoted in MDM or here. CT ABDOMEN PELVIS WO CONTRAST Additional Contrast? None   Preliminary Result   1. Bilateral renal calcifications, some vascular, some of the right renal   pelvis but without evidence of ureteric obstruction. 2. Colonic diverticulosis without acute diverticulitis. Increased colonic   stool. 3. Extensive atherosclerotic disease. 4. Fibroid uterus.    5. No bowel obstruction, gallstones, or

## 2020-02-12 NOTE — ED NOTES
Patient presents to ED with c/o left sided flank pain that has been ongoing for 1 week. 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 Unable to recall antibiotic that she's been taking. Patient also reports c/o chills and nausea.       Km Christianson RN  02/12/20 9737

## 2020-02-20 ENCOUNTER — CARE COORDINATION (OUTPATIENT)
Dept: CASE MANAGEMENT | Age: 75
End: 2020-02-20

## 2020-02-20 NOTE — CARE COORDINATION
[facility] Transitions Follow Up Call    2020    Patient: Tootie Jason  Patient : 1945   MRN: [id number]  Reason for Admission:   Discharge Date: 20 RARS: Readmission Risk Score: 42         Spoke with: Ana Rizvi, patient    Follow Up:  Contacted patient for BPCI-A follow up. She states she has good days and bad days. Reports her breathing is at baseline. Denies having any chest pain/discomfort, lightheadedness/dizziness. Patient reports seeing her doctors a week in a half ago. Received antibiotics at her last visit and now treatment has completed per patient. Continues to have UTI symptoms occasionally. Denies having blood in urine. She will contact MD if symptoms persist or worsen. She does not have any questions or concerns at this time.       Future Appointments   Date Time Provider Yue Qiu   3/4/2020 10:30 AM STV STA CHAIR 16 [facility] STA [facility] [place]   3/12/2020 11:00 AM Barrington Vargas MD  Cancer Ct [facility]   3/12/2020 11:30 AM STV STA CHAIR 18 [facility] STA MED [place]   2020 11:00 AM Sudeep Smith MD INFT DISEASE Lala Nguyen RN

## 2020-02-28 ENCOUNTER — APPOINTMENT (OUTPATIENT)
Dept: GENERAL RADIOLOGY | Age: 75
DRG: 191 | End: 2020-02-28
Payer: MEDICARE

## 2020-02-28 ENCOUNTER — CARE COORDINATION (OUTPATIENT)
Dept: CASE MANAGEMENT | Age: 75
End: 2020-02-28

## 2020-02-28 ENCOUNTER — HOSPITAL ENCOUNTER (INPATIENT)
Age: 75
LOS: 5 days | Discharge: NURSING, REHAB OR LONG-TERM CARE | DRG: 191 | End: 2020-03-04
Attending: EMERGENCY MEDICINE | Admitting: INTERNAL MEDICINE
Payer: MEDICARE

## 2020-02-28 PROBLEM — J44.1 CHRONIC OBSTRUCTIVE PULMONARY DISEASE WITH (ACUTE) EXACERBATION (HCC): Status: ACTIVE | Noted: 2020-02-28

## 2020-02-28 LAB
ABSOLUTE EOS #: 0.12 K/UL (ref 0–0.4)
ABSOLUTE IMMATURE GRANULOCYTE: 0 K/UL (ref 0–0.3)
ABSOLUTE LYMPH #: 0.18 K/UL (ref 1–4.8)
ABSOLUTE MONO #: 0.31 K/UL (ref 0.2–0.8)
ALBUMIN SERPL-MCNC: 4.1 G/DL (ref 3.5–5.2)
ALBUMIN/GLOBULIN RATIO: ABNORMAL (ref 1–2.5)
ALP BLD-CCNC: 63 U/L (ref 35–104)
ALT SERPL-CCNC: 5 U/L (ref 5–33)
ANION GAP SERPL CALCULATED.3IONS-SCNC: 13 MMOL/L (ref 9–17)
AST SERPL-CCNC: 10 U/L
BASOPHILS # BLD: 0 %
BASOPHILS ABSOLUTE: 0 K/UL (ref 0–0.2)
BILIRUB SERPL-MCNC: 0.18 MG/DL (ref 0.3–1.2)
BNP INTERPRETATION: ABNORMAL
BUN BLDV-MCNC: 25 MG/DL (ref 8–23)
BUN/CREAT BLD: 17 (ref 9–20)
CALCIUM SERPL-MCNC: 9.2 MG/DL (ref 8.6–10.4)
CHLORIDE BLD-SCNC: 95 MMOL/L (ref 98–107)
CO2: 34 MMOL/L (ref 20–31)
CREAT SERPL-MCNC: 1.51 MG/DL (ref 0.5–0.9)
DIFFERENTIAL TYPE: ABNORMAL
DIRECT EXAM: NORMAL
EOSINOPHILS RELATIVE PERCENT: 2 % (ref 1–4)
GFR AFRICAN AMERICAN: 41 ML/MIN
GFR NON-AFRICAN AMERICAN: 34 ML/MIN
GFR SERPL CREATININE-BSD FRML MDRD: ABNORMAL ML/MIN/{1.73_M2}
GFR SERPL CREATININE-BSD FRML MDRD: ABNORMAL ML/MIN/{1.73_M2}
GLUCOSE BLD-MCNC: 172 MG/DL (ref 70–99)
GLUCOSE BLD-MCNC: 186 MG/DL (ref 65–105)
GLUCOSE BLD-MCNC: 190 MG/DL (ref 65–105)
HCT VFR BLD CALC: 32.1 % (ref 36.3–47.1)
HEMOGLOBIN: 9.5 G/DL (ref 11.9–15.1)
IMMATURE GRANULOCYTES: 0 %
LACTIC ACID: 2 MMOL/L (ref 0.5–2.2)
LYMPHOCYTES # BLD: 3 % (ref 24–44)
Lab: NORMAL
MAGNESIUM: 1.4 MG/DL (ref 1.6–2.6)
MCH RBC QN AUTO: 32.6 PG (ref 25.2–33.5)
MCHC RBC AUTO-ENTMCNC: 29.6 G/DL (ref 28.4–34.8)
MCV RBC AUTO: 110.3 FL (ref 82.6–102.9)
MONOCYTES # BLD: 5 % (ref 1–7)
MORPHOLOGY: ABNORMAL
NRBC AUTOMATED: 0 PER 100 WBC
PDW BLD-RTO: 15.2 % (ref 11.8–14.4)
PLATELET # BLD: 73 K/UL (ref 138–453)
PLATELET ESTIMATE: ABNORMAL
PMV BLD AUTO: 11 FL (ref 8.1–13.5)
POTASSIUM SERPL-SCNC: 3.4 MMOL/L (ref 3.7–5.3)
PRO-BNP: 564 PG/ML
PROCALCITONIN: 0.25 NG/ML
RBC # BLD: 2.91 M/UL (ref 3.95–5.11)
RBC # BLD: ABNORMAL 10*6/UL
SEG NEUTROPHILS: 90 % (ref 36–66)
SEGMENTED NEUTROPHILS ABSOLUTE COUNT: 5.49 K/UL (ref 1.8–7.7)
SODIUM BLD-SCNC: 142 MMOL/L (ref 135–144)
SPECIMEN DESCRIPTION: NORMAL
TOTAL PROTEIN: 7.2 G/DL (ref 6.4–8.3)
TROPONIN INTERP: ABNORMAL
TROPONIN T: ABNORMAL NG/ML
TROPONIN, HIGH SENSITIVITY: 33 NG/L (ref 0–14)
WBC # BLD: 6.1 K/UL (ref 3.5–11.3)
WBC # BLD: ABNORMAL 10*3/UL

## 2020-02-28 PROCEDURE — 0100U HC RESPIRPTHGN MULT REV TRANS & AMP PRB TECH 21 TRGT: CPT

## 2020-02-28 PROCEDURE — 86738 MYCOPLASMA ANTIBODY: CPT

## 2020-02-28 PROCEDURE — 80053 COMPREHEN METABOLIC PANEL: CPT

## 2020-02-28 PROCEDURE — 96374 THER/PROPH/DIAG INJ IV PUSH: CPT

## 2020-02-28 PROCEDURE — 87040 BLOOD CULTURE FOR BACTERIA: CPT

## 2020-02-28 PROCEDURE — 2580000003 HC RX 258: Performed by: NURSE PRACTITIONER

## 2020-02-28 PROCEDURE — 82947 ASSAY GLUCOSE BLOOD QUANT: CPT

## 2020-02-28 PROCEDURE — 83605 ASSAY OF LACTIC ACID: CPT

## 2020-02-28 PROCEDURE — 96375 TX/PRO/DX INJ NEW DRUG ADDON: CPT

## 2020-02-28 PROCEDURE — 6370000000 HC RX 637 (ALT 250 FOR IP): Performed by: NURSE PRACTITIONER

## 2020-02-28 PROCEDURE — 99285 EMERGENCY DEPT VISIT HI MDM: CPT

## 2020-02-28 PROCEDURE — 94761 N-INVAS EAR/PLS OXIMETRY MLT: CPT

## 2020-02-28 PROCEDURE — 6360000002 HC RX W HCPCS: Performed by: INTERNAL MEDICINE

## 2020-02-28 PROCEDURE — 83735 ASSAY OF MAGNESIUM: CPT

## 2020-02-28 PROCEDURE — 81003 URINALYSIS AUTO W/O SCOPE: CPT

## 2020-02-28 PROCEDURE — 83880 ASSAY OF NATRIURETIC PEPTIDE: CPT

## 2020-02-28 PROCEDURE — 6370000000 HC RX 637 (ALT 250 FOR IP): Performed by: EMERGENCY MEDICINE

## 2020-02-28 PROCEDURE — 94640 AIRWAY INHALATION TREATMENT: CPT

## 2020-02-28 PROCEDURE — 83036 HEMOGLOBIN GLYCOSYLATED A1C: CPT

## 2020-02-28 PROCEDURE — 1200000000 HC SEMI PRIVATE

## 2020-02-28 PROCEDURE — 71045 X-RAY EXAM CHEST 1 VIEW: CPT

## 2020-02-28 PROCEDURE — 84484 ASSAY OF TROPONIN QUANT: CPT

## 2020-02-28 PROCEDURE — 6370000000 HC RX 637 (ALT 250 FOR IP): Performed by: INTERNAL MEDICINE

## 2020-02-28 PROCEDURE — 84145 PROCALCITONIN (PCT): CPT

## 2020-02-28 PROCEDURE — 2700000000 HC OXYGEN THERAPY PER DAY

## 2020-02-28 PROCEDURE — 2580000003 HC RX 258: Performed by: EMERGENCY MEDICINE

## 2020-02-28 PROCEDURE — 6360000002 HC RX W HCPCS: Performed by: EMERGENCY MEDICINE

## 2020-02-28 PROCEDURE — 36415 COLL VENOUS BLD VENIPUNCTURE: CPT

## 2020-02-28 PROCEDURE — 87804 INFLUENZA ASSAY W/OPTIC: CPT

## 2020-02-28 PROCEDURE — 85025 COMPLETE CBC W/AUTO DIFF WBC: CPT

## 2020-02-28 PROCEDURE — 6360000002 HC RX W HCPCS: Performed by: NURSE PRACTITIONER

## 2020-02-28 RX ORDER — PANTOPRAZOLE SODIUM 40 MG/1
40 TABLET, DELAYED RELEASE ORAL
Status: DISCONTINUED | OUTPATIENT
Start: 2020-02-29 | End: 2020-03-04 | Stop reason: HOSPADM

## 2020-02-28 RX ORDER — SODIUM CHLORIDE 0.9 % (FLUSH) 0.9 %
10 SYRINGE (ML) INJECTION PRN
Status: DISCONTINUED | OUTPATIENT
Start: 2020-02-28 | End: 2020-03-04 | Stop reason: HOSPADM

## 2020-02-28 RX ORDER — ATORVASTATIN CALCIUM 20 MG/1
20 TABLET, FILM COATED ORAL DAILY
Status: DISCONTINUED | OUTPATIENT
Start: 2020-02-28 | End: 2020-03-04 | Stop reason: HOSPADM

## 2020-02-28 RX ORDER — ACETAMINOPHEN 500 MG
1000 TABLET ORAL ONCE
Status: COMPLETED | OUTPATIENT
Start: 2020-02-28 | End: 2020-02-28

## 2020-02-28 RX ORDER — BUDESONIDE AND FORMOTEROL FUMARATE DIHYDRATE 160; 4.5 UG/1; UG/1
2 AEROSOL RESPIRATORY (INHALATION) 2 TIMES DAILY
Status: DISCONTINUED | OUTPATIENT
Start: 2020-02-28 | End: 2020-02-28 | Stop reason: SDUPTHER

## 2020-02-28 RX ORDER — METHYLPREDNISOLONE SODIUM SUCCINATE 125 MG/2ML
125 INJECTION, POWDER, LYOPHILIZED, FOR SOLUTION INTRAMUSCULAR; INTRAVENOUS ONCE
Status: COMPLETED | OUTPATIENT
Start: 2020-02-28 | End: 2020-02-28

## 2020-02-28 RX ORDER — RASAGILINE 0.5 MG/1
1 TABLET ORAL DAILY
Status: DISCONTINUED | OUTPATIENT
Start: 2020-02-28 | End: 2020-03-04 | Stop reason: HOSPADM

## 2020-02-28 RX ORDER — MAGNESIUM SULFATE 1 G/100ML
1 INJECTION INTRAVENOUS
Status: DISPENSED | OUTPATIENT
Start: 2020-02-28 | End: 2020-02-28

## 2020-02-28 RX ORDER — DEXTROSE MONOHYDRATE 50 MG/ML
100 INJECTION, SOLUTION INTRAVENOUS PRN
Status: DISCONTINUED | OUTPATIENT
Start: 2020-02-28 | End: 2020-03-04 | Stop reason: HOSPADM

## 2020-02-28 RX ORDER — ALBUTEROL SULFATE 2.5 MG/3ML
2.5 SOLUTION RESPIRATORY (INHALATION) 2 TIMES DAILY
Status: DISCONTINUED | OUTPATIENT
Start: 2020-02-28 | End: 2020-02-28

## 2020-02-28 RX ORDER — POTASSIUM CHLORIDE 7.45 MG/ML
10 INJECTION INTRAVENOUS PRN
Status: DISCONTINUED | OUTPATIENT
Start: 2020-02-28 | End: 2020-02-28

## 2020-02-28 RX ORDER — METOCLOPRAMIDE HYDROCHLORIDE 5 MG/ML
10 INJECTION INTRAMUSCULAR; INTRAVENOUS ONCE
Status: COMPLETED | OUTPATIENT
Start: 2020-02-28 | End: 2020-02-28

## 2020-02-28 RX ORDER — CALCITRIOL 0.25 UG/1
0.25 CAPSULE, LIQUID FILLED ORAL DAILY
Status: DISCONTINUED | OUTPATIENT
Start: 2020-02-28 | End: 2020-02-29

## 2020-02-28 RX ORDER — DEXTROSE MONOHYDRATE 25 G/50ML
12.5 INJECTION, SOLUTION INTRAVENOUS PRN
Status: DISCONTINUED | OUTPATIENT
Start: 2020-02-28 | End: 2020-03-04 | Stop reason: HOSPADM

## 2020-02-28 RX ORDER — ALBUTEROL SULFATE 2.5 MG/3ML
2.5 SOLUTION RESPIRATORY (INHALATION) 4 TIMES DAILY
Status: DISCONTINUED | OUTPATIENT
Start: 2020-02-28 | End: 2020-02-28

## 2020-02-28 RX ORDER — SENNA PLUS 8.6 MG/1
2 TABLET ORAL NIGHTLY
Status: DISCONTINUED | OUTPATIENT
Start: 2020-02-28 | End: 2020-03-04 | Stop reason: HOSPADM

## 2020-02-28 RX ORDER — FUROSEMIDE 20 MG/1
20 TABLET ORAL DAILY
Status: DISCONTINUED | OUTPATIENT
Start: 2020-02-28 | End: 2020-03-04 | Stop reason: HOSPADM

## 2020-02-28 RX ORDER — ALOGLIPTIN 12.5 MG/1
12.5 TABLET, FILM COATED ORAL DAILY
Status: DISCONTINUED | OUTPATIENT
Start: 2020-02-28 | End: 2020-03-04 | Stop reason: HOSPADM

## 2020-02-28 RX ORDER — IPRATROPIUM BROMIDE AND ALBUTEROL SULFATE 2.5; .5 MG/3ML; MG/3ML
1 SOLUTION RESPIRATORY (INHALATION) ONCE
Status: COMPLETED | OUTPATIENT
Start: 2020-02-28 | End: 2020-02-28

## 2020-02-28 RX ORDER — NICOTINE POLACRILEX 4 MG
15 LOZENGE BUCCAL PRN
Status: DISCONTINUED | OUTPATIENT
Start: 2020-02-28 | End: 2020-03-04 | Stop reason: HOSPADM

## 2020-02-28 RX ORDER — ALBUTEROL SULFATE 2.5 MG/3ML
2.5 SOLUTION RESPIRATORY (INHALATION)
Status: DISCONTINUED | OUTPATIENT
Start: 2020-02-28 | End: 2020-03-04 | Stop reason: HOSPADM

## 2020-02-28 RX ORDER — BUDESONIDE AND FORMOTEROL FUMARATE DIHYDRATE 160; 4.5 UG/1; UG/1
2 AEROSOL RESPIRATORY (INHALATION) 2 TIMES DAILY
Status: DISCONTINUED | OUTPATIENT
Start: 2020-02-28 | End: 2020-03-02

## 2020-02-28 RX ORDER — MAGNESIUM SULFATE 1 G/100ML
1 INJECTION INTRAVENOUS PRN
Status: DISCONTINUED | OUTPATIENT
Start: 2020-02-28 | End: 2020-02-28 | Stop reason: ALTCHOICE

## 2020-02-28 RX ORDER — POTASSIUM CHLORIDE 20 MEQ/1
40 TABLET, EXTENDED RELEASE ORAL PRN
Status: DISCONTINUED | OUTPATIENT
Start: 2020-02-28 | End: 2020-02-28

## 2020-02-28 RX ORDER — ISOSORBIDE MONONITRATE 30 MG/1
30 TABLET, EXTENDED RELEASE ORAL DAILY
Status: DISCONTINUED | OUTPATIENT
Start: 2020-02-28 | End: 2020-03-04 | Stop reason: HOSPADM

## 2020-02-28 RX ORDER — ASPIRIN 81 MG/1
81 TABLET ORAL DAILY
Status: DISCONTINUED | OUTPATIENT
Start: 2020-02-28 | End: 2020-03-04 | Stop reason: HOSPADM

## 2020-02-28 RX ORDER — IPRATROPIUM BROMIDE AND ALBUTEROL SULFATE 2.5; .5 MG/3ML; MG/3ML
1 SOLUTION RESPIRATORY (INHALATION)
Status: DISCONTINUED | OUTPATIENT
Start: 2020-02-28 | End: 2020-03-04 | Stop reason: HOSPADM

## 2020-02-28 RX ORDER — ACETAMINOPHEN 325 MG/1
650 TABLET ORAL EVERY 4 HOURS PRN
Status: DISCONTINUED | OUTPATIENT
Start: 2020-02-28 | End: 2020-03-04 | Stop reason: HOSPADM

## 2020-02-28 RX ORDER — LANOLIN ALCOHOL/MO/W.PET/CERES
325 CREAM (GRAM) TOPICAL 2 TIMES DAILY WITH MEALS
Status: DISCONTINUED | OUTPATIENT
Start: 2020-02-29 | End: 2020-03-04 | Stop reason: HOSPADM

## 2020-02-28 RX ORDER — TRAZODONE HYDROCHLORIDE 50 MG/1
50 TABLET ORAL NIGHTLY
Status: DISCONTINUED | OUTPATIENT
Start: 2020-02-28 | End: 2020-03-04 | Stop reason: HOSPADM

## 2020-02-28 RX ORDER — SODIUM CHLORIDE 0.9 % (FLUSH) 0.9 %
10 SYRINGE (ML) INJECTION EVERY 12 HOURS SCHEDULED
Status: DISCONTINUED | OUTPATIENT
Start: 2020-02-28 | End: 2020-03-04 | Stop reason: HOSPADM

## 2020-02-28 RX ORDER — ROPINIROLE 1 MG/1
2 TABLET, FILM COATED ORAL 3 TIMES DAILY
Status: DISCONTINUED | OUTPATIENT
Start: 2020-02-28 | End: 2020-03-04 | Stop reason: HOSPADM

## 2020-02-28 RX ORDER — POTASSIUM CHLORIDE 20 MEQ/1
40 TABLET, EXTENDED RELEASE ORAL ONCE
Status: COMPLETED | OUTPATIENT
Start: 2020-02-28 | End: 2020-02-28

## 2020-02-28 RX ORDER — METHYLPREDNISOLONE SODIUM SUCCINATE 40 MG/ML
40 INJECTION, POWDER, LYOPHILIZED, FOR SOLUTION INTRAMUSCULAR; INTRAVENOUS EVERY 6 HOURS
Status: DISCONTINUED | OUTPATIENT
Start: 2020-02-28 | End: 2020-02-29

## 2020-02-28 RX ORDER — AMIODARONE HYDROCHLORIDE 200 MG/1
200 TABLET ORAL DAILY
Status: DISCONTINUED | OUTPATIENT
Start: 2020-02-28 | End: 2020-03-04 | Stop reason: HOSPADM

## 2020-02-28 RX ORDER — CARBIDOPA AND LEVODOPA 25; 100 MG/1; MG/1
1 TABLET, EXTENDED RELEASE ORAL 4 TIMES DAILY
Status: DISCONTINUED | OUTPATIENT
Start: 2020-02-28 | End: 2020-03-04 | Stop reason: HOSPADM

## 2020-02-28 RX ADMIN — TRAZODONE HYDROCHLORIDE 50 MG: 50 TABLET ORAL at 20:55

## 2020-02-28 RX ADMIN — METOCLOPRAMIDE 10 MG: 5 INJECTION, SOLUTION INTRAMUSCULAR; INTRAVENOUS at 08:13

## 2020-02-28 RX ADMIN — ATORVASTATIN CALCIUM 20 MG: 20 TABLET, FILM COATED ORAL at 20:55

## 2020-02-28 RX ADMIN — ACETAMINOPHEN 1000 MG: 500 TABLET ORAL at 08:12

## 2020-02-28 RX ADMIN — CEFTRIAXONE SODIUM 1 G: 1 INJECTION, POWDER, FOR SOLUTION INTRAMUSCULAR; INTRAVENOUS at 14:25

## 2020-02-28 RX ADMIN — BUDESONIDE AND FORMOTEROL FUMARATE DIHYDRATE 2 PUFF: 160; 4.5 AEROSOL RESPIRATORY (INHALATION) at 21:07

## 2020-02-28 RX ADMIN — INSULIN LISPRO 1 UNITS: 100 INJECTION, SOLUTION INTRAVENOUS; SUBCUTANEOUS at 23:46

## 2020-02-28 RX ADMIN — ACETAMINOPHEN 650 MG: 325 TABLET ORAL at 20:54

## 2020-02-28 RX ADMIN — IPRATROPIUM BROMIDE AND ALBUTEROL SULFATE 1 AMPULE: .5; 3 SOLUTION RESPIRATORY (INHALATION) at 21:07

## 2020-02-28 RX ADMIN — ACETAMINOPHEN 650 MG: 325 TABLET ORAL at 13:02

## 2020-02-28 RX ADMIN — CARBIDOPA AND LEVODOPA 1 TABLET: 25; 100 TABLET, EXTENDED RELEASE ORAL at 20:55

## 2020-02-28 RX ADMIN — MAGNESIUM SULFATE IN DEXTROSE 2 G: 10 INJECTION, SOLUTION INTRAVENOUS at 17:12

## 2020-02-28 RX ADMIN — IPRATROPIUM BROMIDE AND ALBUTEROL SULFATE 1 AMPULE: .5; 3 SOLUTION RESPIRATORY (INHALATION) at 14:22

## 2020-02-28 RX ADMIN — METOPROLOL TARTRATE 25 MG: 25 TABLET ORAL at 20:56

## 2020-02-28 RX ADMIN — METHYLPREDNISOLONE SODIUM SUCCINATE 125 MG: 125 INJECTION, POWDER, FOR SOLUTION INTRAMUSCULAR; INTRAVENOUS at 09:39

## 2020-02-28 RX ADMIN — METHYLPREDNISOLONE SODIUM SUCCINATE 40 MG: 40 INJECTION, POWDER, FOR SOLUTION INTRAMUSCULAR; INTRAVENOUS at 14:25

## 2020-02-28 RX ADMIN — AZITHROMYCIN MONOHYDRATE 500 MG: 500 INJECTION, POWDER, LYOPHILIZED, FOR SOLUTION INTRAVENOUS at 10:45

## 2020-02-28 RX ADMIN — METHYLPREDNISOLONE SODIUM SUCCINATE 40 MG: 40 INJECTION, POWDER, FOR SOLUTION INTRAMUSCULAR; INTRAVENOUS at 20:54

## 2020-02-28 RX ADMIN — Medication 10 ML: at 21:01

## 2020-02-28 RX ADMIN — POTASSIUM CHLORIDE 40 MEQ: 20 TABLET, EXTENDED RELEASE ORAL at 15:02

## 2020-02-28 RX ADMIN — IPRATROPIUM BROMIDE AND ALBUTEROL SULFATE 1 AMPULE: .5; 3 SOLUTION RESPIRATORY (INHALATION) at 08:01

## 2020-02-28 RX ADMIN — ROPINIROLE HYDROCHLORIDE 2 MG: 1 TABLET, FILM COATED ORAL at 20:55

## 2020-02-28 RX ADMIN — APIXABAN 2.5 MG: 2.5 TABLET, FILM COATED ORAL at 20:55

## 2020-02-28 ASSESSMENT — PAIN DESCRIPTION - FREQUENCY
FREQUENCY: CONTINUOUS
FREQUENCY: CONTINUOUS

## 2020-02-28 ASSESSMENT — PAIN SCALES - GENERAL
PAINLEVEL_OUTOF10: 10
PAINLEVEL_OUTOF10: 8
PAINLEVEL_OUTOF10: 10
PAINLEVEL_OUTOF10: 10
PAINLEVEL_OUTOF10: 3

## 2020-02-28 ASSESSMENT — PAIN DESCRIPTION - ORIENTATION: ORIENTATION: ANTERIOR

## 2020-02-28 ASSESSMENT — PAIN DESCRIPTION - PAIN TYPE
TYPE: ACUTE PAIN
TYPE: ACUTE PAIN

## 2020-02-28 ASSESSMENT — PAIN DESCRIPTION - LOCATION
LOCATION: HEAD
LOCATION: HEAD

## 2020-02-28 ASSESSMENT — PAIN DESCRIPTION - PROGRESSION
CLINICAL_PROGRESSION: NOT CHANGED
CLINICAL_PROGRESSION: GRADUALLY IMPROVING
CLINICAL_PROGRESSION: GRADUALLY IMPROVING

## 2020-02-28 ASSESSMENT — PAIN DESCRIPTION - ONSET: ONSET: ON-GOING

## 2020-02-28 ASSESSMENT — PAIN DESCRIPTION - DESCRIPTORS: DESCRIPTORS: SHARP

## 2020-02-28 NOTE — ED NOTES
Pt to room 18 via EMS with c/o ongoing cough/congestion with SOB at rest x 1 week. Per squad pt self administered Albuterol breathing tx at approximately 0300 with no relief. Pt presents to ED A&Ox4, pleasant and cooperative with care. Pt reports hx of COPD and CHF. Pt states \"My  was just diagnosed with bronchitis so I think I caught it from him\". Pt reports frontal lobe HA that started yesterday, self administered Tylenol at onset with no relief. Pt denies any CP, dizziness, abd pain, N/V, or fevers, but does report intermittent blurred vision and diarrhea for past 2-3 days. PERRLA. Respirations even and slightly labored, audible wheezing noted. Abd soft, obese, BS active x 4 quads. Skin PWD, MMM. NSR on monitor without ectopy. NAD noted.       Tina Medrano RN  02/28/20 8038

## 2020-02-28 NOTE — ED PROVIDER NOTES
bactrim [sulfamethoxazole-trimethoprim]. FAMILY HISTORY     She indicated that her mother is . She indicated that her father is . She indicated that her maternal grandmother is . She indicated that her maternal grandfather is . She indicated that her paternal grandmother is . She indicated that her paternal grandfather is . SOCIAL HISTORY       Social History     Tobacco Use    Smoking status: Former Smoker     Packs/day: 2.00     Years: 15.00     Pack years: 30.00     Types: Cigarettes     Last attempt to quit: 10/31/1970     Years since quittin.3    Smokeless tobacco: Never Used   Substance Use Topics    Alcohol use: Not Currently    Drug use: No     Comment: Pt denies use. PHYSICAL EXAM     INITIAL VITALS: BP (!) 178/80   Pulse 111   Temp 98.6 °F (37 °C) (Oral)   Resp 20   Ht 4' 9\" (1.448 m)   Wt 173 lb 1.6 oz (78.5 kg)   LMP 2004 (Within Years)   SpO2 95%   BMI 37.46 kg/m²    Physical Exam  Constitutional:       Appearance: Normal appearance. HENT:      Head: Normocephalic. Right Ear: Tympanic membrane normal.      Left Ear: Tympanic membrane normal.      Nose: No congestion or rhinorrhea. Mouth/Throat:      Mouth: Mucous membranes are moist.   Eyes:      Extraocular Movements: Extraocular movements intact. Pupils: Pupils are equal, round, and reactive to light. Cardiovascular:      Rate and Rhythm: Normal rate and regular rhythm. Pulses: Normal pulses. Heart sounds: Normal heart sounds. No murmur. No friction rub. Pulmonary:      Effort: No respiratory distress. Breath sounds: No stridor. Wheezing and rhonchi present. Abdominal:      General: There is no distension. Tenderness: There is no abdominal tenderness. There is no guarding or rebound. Musculoskeletal:         General: No tenderness. Right lower leg: Edema present. Left lower leg: Edema present.    Skin:     Coloration: (ZITHROMAX) 500 mg in D5W 250ml addavial    ipratropium-albuterol (DUONEB) nebulizer solution 1 ampule    budesonide-formoterol (SYMBICORT) 160-4.5 MCG/ACT inhaler 2 puff    DISCONTD: magnesium sulfate 1 g in dextrose 5% 100 mL IVPB    DISCONTD: potassium chloride (KLOR-CON M) extended release tablet 40 mEq    DISCONTD: potassium bicarb-citric acid (EFFER-K) effervescent tablet 40 mEq    DISCONTD: potassium chloride 10 mEq/100 mL IVPB (Peripheral Line)    cefTRIAXone (ROCEPHIN) 1 g IVPB in 50 mL D5W minibag    potassium chloride (KLOR-CON M) extended release tablet 40 mEq    magnesium sulfate 1 g in dextrose 5% 100 mL IVPB    albuterol (PROVENTIL) nebulizer solution 2.5 mg     CONSULTS:  IP CONSULT TO INTERNAL MEDICINE  IP CONSULT TO PULMONOLOGY    FINAL IMPRESSION      1. COPD exacerbation ([facility] [address]          DISPOSITION/PLAN   DISPOSITION        PATIENT REFERRED TO:  Alis Mena MD  [facility]  [phone]          DISCHARGE MEDICATIONS:  Current Discharge Medication List        Gregory Cosme MD  Attending Emergency Physician                   Ruma Baer MD  02/28/20 2343

## 2020-02-28 NOTE — ED NOTES
Pt called out c/o pain to [facility] IV site. No redness or warmth noted. IV infiltrated upon flushing with 10ml NS. IV d/c'd, cath intact. Gauri Brito, Charge RN notified to insert another US guided IV for Atb administration and admission to hospital. Pt updated and verbalized understanding.       Serafin Harris RN  02/28/20 1385

## 2020-02-28 NOTE — PROGRESS NOTES
The potassium/magnesium sliding scale has been automatically discontinued per [facility] approved policy because the patient has decreased renal function (CrCl<30 ml/min). The patient's current K/Mag levels are currently:    Recent Labs     02/28/20  0754   K 3.4*   MG 1.4*       CrCl cannot be calculated (Unknown ideal weight.). Calc CrCl = 28  For patients with decreased renal function (below 30ml/min) needing potassium/magnesium supplementation, please order individual bolus doses with appropriate monitoring. Please contact the inpatient pharmacy at [phone] with any concerns. Thank you.     Michel Wanette, [place]  2/28/2020  2:32 PM

## 2020-02-28 NOTE — PROGRESS NOTES
1 [facility] Assessment complete. COPD exacerbation ([facility] [address] [J44.1]  COPD exacerbation ([facility] [address] [J44.1] . Vitals:    02/28/20 1234   BP: (!) 164/75   Pulse: 100   Resp: 20   Temp: 98.4 °F (36.9 °C)   SpO2: 94%   . Patients home meds are   Prior to Admission medications    Medication Sig Start Date End Date Taking? Authorizing Provider   isosorbide mononitrate (IMDUR) 30 MG extended release tablet Take 1 tablet by mouth daily 1/3/20   Berny Westbrook MD   apixaban (ELIQUIS) 2.5 MG TABS tablet Take 1 tablet by mouth 2 times daily 12/10/19   Berny Westbrook MD   darbepoetin albaro-polysorbate (ARANESP) 200 MCG/0.4ML SOSY injection Inject 200 mcg into the skin every 28 days    Historical Provider, MD   fluticasone-vilanterol (BREO ELLIPTA) 100-25 MCG/INH AEPB inhaler Inhale 2 puffs into the lungs daily    Historical Provider, MD   traZODone (DESYREL) 50 MG tablet Take 50 mg by mouth nightly    Historical Provider, MD   albuterol (PROVENTIL) (2.5 MG/3ML) 0.083% nebulizer solution Take 3 mLs by nebulization 4 times daily 9/11/19   Berny Westbrook MD   metoprolol tartrate (LOPRESSOR) 25 MG tablet Take 1 tablet by mouth 2 times daily 9/11/19   Berny Westbrook MD   miconazole (MICOTIN) 2 % powder Apply topically 2 times daily.  9/11/19   Berny Westbrook MD   calcium acetate (PHOSLO) 667 MG capsule Take 1 capsule by mouth 3 times daily (with meals) 9/11/19   Berny Westbrook MD   cyanocobalamin 1000 MCG/ML injection Inject 1,000 mcg into the muscle every 30 days    Historical Provider, MD   melatonin 5 MG TABS tablet Take 5 mg by mouth nightly as needed (sleep)    Historical Provider, MD   calcitRIOL (ROCALTROL) 0.25 MCG capsule Take 0.25 mcg by mouth daily    Historical Provider, MD   cyclobenzaprine (FLEXERIL) 5 MG tablet Take 5 mg by mouth 3 times daily as needed for Muscle spasms    Historical Provider, MD   furosemide (LASIX) 20 MG tablet Take 1 tablet by mouth daily 5/30/19   Gabriel Dutta MD Assessment  BRONCHODILATOR ASSESSMENT SCORE  Score 0 1 2 3 4 5   Breath Sounds   []  Patient Baseline []  No Wheeze good aeration []  Faint, scattered wheezing, good aeration []  Expiratory Wheezing and or moderately diminished [x]  Insp/Exp wheeze and/or very diminished []  Insp/Exp and/ or marked distress   Respiratory Rate   []  Patient Baseline []  Less than 20 []  Less than 20 [x]  20-25 []  Greater than 25 []  Greater than 25   Peak flow % of Pred or PB []  NA   []  Greater than 90%  []  81-90% []  71-80% [x]  Less than or equal to 70%  or unable to perform []  Unable due to Respiratory Distress   Dyspnea re []  Patient Baseline []  No SOB []  No SOB []  SOB on exertion [x]  SOB min activity []  At rest/acute   e FEV% Predicted       []  NA []  Above 69%  []  Unable []  Above 60-69%  []  Unable []  Above 50-59%  []  Unable []  Above 35-49%  [x]  Unable []  Less than 35%  []  Unable                 []  Hyperinflation Assessment  Score 1 2 3   CXR and Breath Sounds   []  Clear []  No atelectasis  Basilar aeration []  Atelectasis or absent basilar breath sounds   Incentive Spirometry Volume  (Per IBW)   []  Greater than or equal to 15ml/Kg []  less than 15ml/Kg []  less than 15ml/Kg   Surgery within last 2 weeks []  None or general   []  Abdominal or thoracic surgery  []  Abdominal or thoracic   Chronic Pulmonary Historyre []  No []  Yes []  Yes     []  Secretion Management Assessment  Score 1 2 3   Bilateral Breath Sounds   []  Occasional Rhonchi []  Scattered Rhonchi []  Course Rhonchi and/or poor aeration   Sputum    []  Small amount of thin secretions []  Moderate amount of viscous secretions []  Copius, Viscious Yellow/ Secretions   CXR as reported by physician []  clear  []  Unavailable []  Infiltrates and/or consolidation  []  Unavailable []  Mucus Plugging and or lobar consolidation  []  Unavailable   Cough []  Strong, productive cough []  Weak productive cough []  No cough or weak non-productive cough

## 2020-02-28 NOTE — ED NOTES
Bed: 18  Expected date: 2/28/20  Expected time: 7:08 AM  Means of arrival: [address]  Comments:  [facility] [address] [facility] [facility].  [facility]  02/28/20 0474

## 2020-02-28 NOTE — CONSULTS
Diverticulosis    Panlobular emphysema (HCC)    Rapid atrial fibrillation (HCC)    CKD (chronic kidney disease) stage 3, GFR 30-59 ml/min (HCC)    KRISTIN (acute kidney injury) ([address]    Anemia in chronic kidney disease    Chronic respiratory failure with hypoxia and hypercapnia (HCC)    Acute kidney injury superimposed on chronic kidney disease ([address]    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 coli UTI (urinary tract infection)    Nephrolithiasis    Candidal intertrigo    Venous insufficiency    Malabsorption    Anemia of chronic renal failure, stage 4 (severe) (HCC)    Iron deficiency    Coronary atherosclerosis    COPD exacerbation (HCC)    Restless leg syndrome    SIRS (systemic inflammatory response syndrome) (HCC)    Nausea and vomiting in adult    Bacterial UTI    Odynophagia    Esophageal dysphagia    Candida esophagitis (HCC)    Sepsis due to urinary tract infection (HCC)    Chronic respiratory failure with hypoxia (HCC)    Chronic diastolic congestive heart failure (HCC)    History of ESBL E. coli infection    Stage 4 chronic kidney disease (HCC)    Fever    Macrocytic anemia    Hypercalcemia    Monoclonal gammopathy of undetermined significance    Acute nonintractable headache    Anemia of chronic renal failure, stage 4 (severe) (HCC)    Traumatic closed displaced fracture of proximal end of right humerus, initial encounter    Urinary tract infection without hematuria    Metabolic encephalopathy    Chest pain    Acute kidney injury ([address]    Right leg swelling    Hematuria    Chest pain, unspecified       HPI     Ian Sanabria is 69 y.o.,  female, admitted because of OPD exacerbation. She had been feeling well up until last night. She reports that last night she became increasingly short of breath. This morning when she was at physical therapy she became quite dyspneic doing therapy and came to the emergency room.   She admits to an occasional productive cough with green sputum. Denies fever but admits to chills. She denies any chest pain. She does admit to abdominal pain and diarrhea for a few days. She is on oxygen at 2 L 24/7 at home. She also has CPAP at home which she is not compliant with. She has a 30-pack-year smoking history. PMHx   Past Medical History      Diagnosis Date    Acute on chronic diastolic congestive heart failure ([facility]     Anesthesia complication     DIFFICULTY WAKING OP    Asthma     Atrial fibrillation ([facility] 2013    Cataracts, bilateral     Cellulitis     BILAT LOWER LEGS-PT STATES IS IMPROVING    Cerebral artery occlusion with cerebral infarction [facility])     Last stroke was February 2017 w/no deficits-TOTAL OF 3    CHF (congestive heart failure) (HCC)     Chronic kidney disease 2013    NOT ON DIALYSIS YET    Chronic kidney disease     Closed fracture of proximal end of right humerus with routine healing     Constipation     CHRONIC    Controlled type 2 diabetes mellitus with stage 3 chronic kidney disease, without long-term current use of insulin ([facility])     COPD (chronic obstructive pulmonary disease) ([facility] 2008    PT. SEES DR. BECK. Home O2 at 2-3L/NC 24/7.     Difficult intravenous access     VEINS ROLL    Difficulty walking     AMBULATES WITH THERAPPY    Diverticulosis     DM2 (diabetes mellitus, type 2) ([facility] 2008    Full dentures     FULL UPPER ONLY    GERD (gastroesophageal reflux disease) 2008    ON RX    H/O fall     Headache(784.0)     [place] (hard of hearing)     HAS HEARING AIDS BUT DOES NOT WEAR    Hyperlipidemia     Hyperplastic polyp of intestine     Hypertension 2008    Impaired ambulation     USES TRANFER CHAIR WALKER OR CANE    Kidney stone 2018    PRESENTLY-SMALL    Living in nursing home     [address]    Morbid obesity with BMI of 40.0-44.9, adult ([facility] 12/30/2016    Muscle weakness     Nephrolithiasis 3/8/2018    Open wound COCCYX    ECTOR on CPAP 2008    USES C-PAP NIGHTLY    Osteoarthritis     OSTEOARTHRITIS    Parkinson disease ([address] 2015    Restless leg syndrome 2013    MILD    Spinal stenosis in cervical region 6/2/2013    Type II or unspecified type diabetes mellitus without mention of complication, not stated as uncontrolled     Urethral caruncle 3/8/2018    Wears glasses     Wears glasses       Past Surgical History        Procedure Laterality Date    APPENDECTOMY      CERVICAL DISC SURGERY  2012    CERVICAL SPINE SURGERY  5/31/13    posterior [id number]    COLONOSCOPY  2009    COLONOSCOPY  12/29/2016    incomplete was not cleaned out    COLONOSCOPY  12/30/2016    COLONOSCOPY  04/25/2017     SIGMOID COLON POLYPECTOMY:  HYPERPLASTIC POLYP ,   DIVERTICULOSIS    CYSTORRHAPHY  04/04/2018    EYE SURGERY Bilateral 2017    CATARACTS W/ IOL    HARDWARE REMOVAL Right 07/05/2019    HARDWARE REMOVAL PINS  HUMERUS     HARDWARE REMOVAL Right 7/5/2019    HARDWARE REMOVAL PINS  HUMERUS  C-ARM performed by Gabriel Garrett MD at [address]  05/31/2013    Dr. Jeferson Woodruff DX W/CELL WASHG [address] [facility] N/A 6/5/2018    [facility] N/A 4/25/2017    COLONOSCOPY POLYPECTOMY HOT BIOPSY performed by Tiajuana Mcburney, MD at [address] [facility] 4/4/2018    CYSTOSCOPY performed by Kota Yepez MD at [address] Right 5/28/2019    SHOULDER CLOSED REDUCTION PERCUTANEOUS PINNING RIGHT [facility]  12/28/2016    gastritis, esophagitis,     UPPER GASTROINTESTINAL ENDOSCOPY N/A 8/29/2018    EGD BIOPSY performed by Tiajuana Mcburney, MD at [address] [facility]    Current Medications    sodium chloride flush  10 mL Intravenous 2 times per day    (CORDARONE) 200 MG tablet, Take 200 mg by mouth daily   magnesium oxide (MAG-OX) 400 (241.3 Mg) MG TABS tablet, Take 1 tablet by mouth 2 times daily  senna (SENOKOT) 8.6 MG tablet, Take 2 tablets by mouth nightly   aspirin 81 MG tablet, Take 81 mg by mouth daily   ferrous sulfate 325 (65 Fe) MG EC tablet, Take 1 tablet by mouth 2 times daily (with meals)  rasagiline mesylate 1 MG TABS, Take 1 tablet by mouth daily  Oxygen Concentrator, Dx: Pneumonia, use as directed. (Patient taking differently: Dx: Pneumonia, use as directed. ON )    Allergies    Dye [barium-containing compounds]; Pcn [penicillins]; and Bactrim [sulfamethoxazole-trimethoprim]  Social History     Social History     Tobacco Use    Smoking status: Former Smoker     Packs/day: 2.00     Years: 15.00     Pack years: 30.00     Types: Cigarettes     Last attempt to quit: 10/31/1970     Years since quittin.3    Smokeless tobacco: Never Used   Substance Use Topics    Alcohol use: Not Currently     Family History          Problem Relation Age of Onset    Heart Failure Mother     Hypertension Mother     Heart Disease Mother     High Blood Pressure Mother      ROS - 11 systems   General Denies any fever. Admits to chills  HEENT Denies any diplopia, tinnitus or vertigo  Resp positive for  cough with sputum, dyspnea and wheezing  Cardiac Denies any chest pain, palpitations, claudication or edema  GI Denies any melena, hematochezia, hematemesis or pyrosis   Denies any frequency, urgency, hesitancy or incontinence  Heme Denies bruising or bleeding easily  Endocrine admits to history of diabetes. Neuro Denies any focal motor or sensory deficits  Psychiatric Denies anxiety, depression, suicidal ideation  Skin Denies rashes, itching, open sores    Vitals     height is 4' 9\" (1.448 m) and weight is 173 lb 1.6 oz (78.5 kg). Her oral temperature is 98.4 °F (36.9 °C). Her blood pressure is 164/75 (abnormal) and her pulse is 100.  Her respiration is

## 2020-02-29 LAB
ABSOLUTE EOS #: 0 K/UL (ref 0–0.4)
ABSOLUTE IMMATURE GRANULOCYTE: 0.05 K/UL (ref 0–0.3)
ABSOLUTE LYMPH #: 0.27 K/UL (ref 1–4.8)
ABSOLUTE MONO #: 0.16 K/UL (ref 0.2–0.8)
ADENOVIRUS PCR: NOT DETECTED
ANION GAP SERPL CALCULATED.3IONS-SCNC: 15 MMOL/L (ref 9–17)
BASOPHILS # BLD: 0 %
BASOPHILS ABSOLUTE: 0 K/UL (ref 0–0.2)
BORDETELLA PARAPERTUSSIS: NOT DETECTED
BORDETELLA PERTUSSIS PCR: NOT DETECTED
BUN BLDV-MCNC: 28 MG/DL (ref 8–23)
BUN/CREAT BLD: 17 (ref 9–20)
CALCIUM SERPL-MCNC: 8.6 MG/DL (ref 8.6–10.4)
CHLAMYDIA PNEUMONIAE BY PCR: NOT DETECTED
CHLORIDE BLD-SCNC: 95 MMOL/L (ref 98–107)
CO2: 31 MMOL/L (ref 20–31)
CORONAVIRUS 229E PCR: NOT DETECTED
CORONAVIRUS HKU1 PCR: NOT DETECTED
CORONAVIRUS NL63 PCR: NOT DETECTED
CORONAVIRUS OC43 PCR: NOT DETECTED
CREAT SERPL-MCNC: 1.62 MG/DL (ref 0.5–0.9)
DIFFERENTIAL TYPE: ABNORMAL
DIRECT EXAM: NORMAL
EOSINOPHILS RELATIVE PERCENT: 0 % (ref 1–4)
GFR AFRICAN AMERICAN: 38 ML/MIN
GFR NON-AFRICAN AMERICAN: 31 ML/MIN
GFR SERPL CREATININE-BSD FRML MDRD: ABNORMAL ML/MIN/{1.73_M2}
GFR SERPL CREATININE-BSD FRML MDRD: ABNORMAL ML/MIN/{1.73_M2}
GLUCOSE BLD-MCNC: 122 MG/DL (ref 65–105)
GLUCOSE BLD-MCNC: 152 MG/DL (ref 65–105)
GLUCOSE BLD-MCNC: 202 MG/DL (ref 65–105)
GLUCOSE BLD-MCNC: 210 MG/DL (ref 65–105)
GLUCOSE BLD-MCNC: 222 MG/DL (ref 70–99)
HCT VFR BLD CALC: 32.6 % (ref 36.3–47.1)
HEMOGLOBIN: 9.8 G/DL (ref 11.9–15.1)
HUMAN METAPNEUMOVIRUS PCR: NOT DETECTED
IMMATURE GRANULOCYTES: 1 %
INFLUENZA A BY PCR: NOT DETECTED
INFLUENZA A H1 (2009) PCR: ABNORMAL
INFLUENZA A H1 PCR: ABNORMAL
INFLUENZA A H3 PCR: ABNORMAL
INFLUENZA B BY PCR: NOT DETECTED
LYMPHOCYTES # BLD: 5 % (ref 24–44)
Lab: NORMAL
MCH RBC QN AUTO: 32.6 PG (ref 25.2–33.5)
MCHC RBC AUTO-ENTMCNC: 30.1 G/DL (ref 28.4–34.8)
MCV RBC AUTO: 108.3 FL (ref 82.6–102.9)
MONOCYTES # BLD: 3 % (ref 1–7)
MYCOPLASMA PNEUMONIAE PCR: NOT DETECTED
NRBC AUTOMATED: 0 PER 100 WBC
PARAINFLUENZA 1 PCR: NOT DETECTED
PARAINFLUENZA 2 PCR: NOT DETECTED
PARAINFLUENZA 3 PCR: NOT DETECTED
PARAINFLUENZA 4 PCR: NOT DETECTED
PDW BLD-RTO: 15 % (ref 11.8–14.4)
PLATELET # BLD: 79 K/UL (ref 138–453)
PLATELET ESTIMATE: ABNORMAL
PMV BLD AUTO: 10.8 FL (ref 8.1–13.5)
POTASSIUM SERPL-SCNC: 4.1 MMOL/L (ref 3.7–5.3)
RBC # BLD: 3.01 M/UL (ref 3.95–5.11)
RBC # BLD: ABNORMAL 10*6/UL
RESP SYNCYTIAL VIRUS PCR: DETECTED
RHINO/ENTEROVIRUS PCR: NOT DETECTED
SEG NEUTROPHILS: 91 % (ref 36–66)
SEGMENTED NEUTROPHILS ABSOLUTE COUNT: 4.82 K/UL (ref 1.8–7.7)
SODIUM BLD-SCNC: 141 MMOL/L (ref 135–144)
SPECIMEN DESCRIPTION: ABNORMAL
SPECIMEN DESCRIPTION: NORMAL
WBC # BLD: 5.3 K/UL (ref 3.5–11.3)
WBC # BLD: ABNORMAL 10*3/UL

## 2020-02-29 PROCEDURE — 36415 COLL VENOUS BLD VENIPUNCTURE: CPT

## 2020-02-29 PROCEDURE — 2580000003 HC RX 258: Performed by: NURSE PRACTITIONER

## 2020-02-29 PROCEDURE — 6360000002 HC RX W HCPCS: Performed by: INTERNAL MEDICINE

## 2020-02-29 PROCEDURE — 6370000000 HC RX 637 (ALT 250 FOR IP): Performed by: NURSE PRACTITIONER

## 2020-02-29 PROCEDURE — 2700000000 HC OXYGEN THERAPY PER DAY

## 2020-02-29 PROCEDURE — 94640 AIRWAY INHALATION TREATMENT: CPT

## 2020-02-29 PROCEDURE — 94761 N-INVAS EAR/PLS OXIMETRY MLT: CPT

## 2020-02-29 PROCEDURE — 6360000002 HC RX W HCPCS: Performed by: NURSE PRACTITIONER

## 2020-02-29 PROCEDURE — 85025 COMPLETE CBC W/AUTO DIFF WBC: CPT

## 2020-02-29 PROCEDURE — 2580000003 HC RX 258: Performed by: EMERGENCY MEDICINE

## 2020-02-29 PROCEDURE — 6370000000 HC RX 637 (ALT 250 FOR IP): Performed by: INTERNAL MEDICINE

## 2020-02-29 PROCEDURE — 80048 BASIC METABOLIC PNL TOTAL CA: CPT

## 2020-02-29 PROCEDURE — 87449 NOS EACH ORGANISM AG IA: CPT

## 2020-02-29 PROCEDURE — 6370000000 HC RX 637 (ALT 250 FOR IP): Performed by: EMERGENCY MEDICINE

## 2020-02-29 PROCEDURE — 1200000000 HC SEMI PRIVATE

## 2020-02-29 PROCEDURE — 82947 ASSAY GLUCOSE BLOOD QUANT: CPT

## 2020-02-29 RX ORDER — ACETAMINOPHEN 160 MG
4000 TABLET,DISINTEGRATING ORAL DAILY
Status: ON HOLD | COMMUNITY
End: 2020-03-04 | Stop reason: HOSPADM

## 2020-02-29 RX ORDER — ASCORBIC ACID 125 MG
5000 TABLET,CHEWABLE ORAL 2 TIMES DAILY
COMMUNITY

## 2020-02-29 RX ORDER — CALCITRIOL 0.25 UG/1
0.75 CAPSULE, LIQUID FILLED ORAL DAILY
Status: DISCONTINUED | OUTPATIENT
Start: 2020-03-01 | End: 2020-03-04 | Stop reason: HOSPADM

## 2020-02-29 RX ORDER — METHYLPREDNISOLONE SODIUM SUCCINATE 40 MG/ML
40 INJECTION, POWDER, LYOPHILIZED, FOR SOLUTION INTRAMUSCULAR; INTRAVENOUS EVERY 8 HOURS
Status: DISCONTINUED | OUTPATIENT
Start: 2020-02-29 | End: 2020-03-04

## 2020-02-29 RX ADMIN — FUROSEMIDE 20 MG: 20 TABLET ORAL at 12:47

## 2020-02-29 RX ADMIN — CARBIDOPA AND LEVODOPA 1 TABLET: 25; 100 TABLET, EXTENDED RELEASE ORAL at 11:11

## 2020-02-29 RX ADMIN — ROPINIROLE HYDROCHLORIDE 2 MG: 1 TABLET, FILM COATED ORAL at 21:45

## 2020-02-29 RX ADMIN — CALCIUM ACETATE 667 MG: 667 CAPSULE ORAL at 11:11

## 2020-02-29 RX ADMIN — BUDESONIDE AND FORMOTEROL FUMARATE DIHYDRATE 2 PUFF: 160; 4.5 AEROSOL RESPIRATORY (INHALATION) at 20:17

## 2020-02-29 RX ADMIN — APIXABAN 2.5 MG: 2.5 TABLET, FILM COATED ORAL at 21:44

## 2020-02-29 RX ADMIN — FERROUS SULFATE TAB EC 325 MG (65 MG FE EQUIVALENT) 325 MG: 325 (65 FE) TABLET DELAYED RESPONSE at 18:21

## 2020-02-29 RX ADMIN — IPRATROPIUM BROMIDE AND ALBUTEROL SULFATE 1 AMPULE: .5; 3 SOLUTION RESPIRATORY (INHALATION) at 08:32

## 2020-02-29 RX ADMIN — Medication 10 ML: at 08:40

## 2020-02-29 RX ADMIN — BUDESONIDE AND FORMOTEROL FUMARATE DIHYDRATE 2 PUFF: 160; 4.5 AEROSOL RESPIRATORY (INHALATION) at 08:32

## 2020-02-29 RX ADMIN — CALCITRIOL 0.25 MCG: 0.25 CAPSULE ORAL at 11:12

## 2020-02-29 RX ADMIN — IPRATROPIUM BROMIDE AND ALBUTEROL SULFATE 1 AMPULE: .5; 3 SOLUTION RESPIRATORY (INHALATION) at 16:10

## 2020-02-29 RX ADMIN — PANTOPRAZOLE SODIUM 40 MG: 40 TABLET, DELAYED RELEASE ORAL at 06:19

## 2020-02-29 RX ADMIN — METOPROLOL TARTRATE 25 MG: 25 TABLET ORAL at 11:12

## 2020-02-29 RX ADMIN — CARBIDOPA AND LEVODOPA 1 TABLET: 25; 100 TABLET, EXTENDED RELEASE ORAL at 21:45

## 2020-02-29 RX ADMIN — CALCIUM ACETATE 667 MG: 667 CAPSULE ORAL at 18:20

## 2020-02-29 RX ADMIN — ACETAMINOPHEN 650 MG: 325 TABLET ORAL at 22:04

## 2020-02-29 RX ADMIN — METHYLPREDNISOLONE SODIUM SUCCINATE 40 MG: 40 INJECTION, POWDER, FOR SOLUTION INTRAMUSCULAR; INTRAVENOUS at 11:06

## 2020-02-29 RX ADMIN — Medication 10 ML: at 21:51

## 2020-02-29 RX ADMIN — METHYLPREDNISOLONE SODIUM SUCCINATE 40 MG: 40 INJECTION, POWDER, FOR SOLUTION INTRAMUSCULAR; INTRAVENOUS at 21:44

## 2020-02-29 RX ADMIN — INSULIN LISPRO 2 UNITS: 100 INJECTION, SOLUTION INTRAVENOUS; SUBCUTANEOUS at 08:40

## 2020-02-29 RX ADMIN — CARBIDOPA AND LEVODOPA 1 TABLET: 25; 100 TABLET, EXTENDED RELEASE ORAL at 13:56

## 2020-02-29 RX ADMIN — AZITHROMYCIN MONOHYDRATE 500 MG: 500 INJECTION, POWDER, LYOPHILIZED, FOR SOLUTION INTRAVENOUS at 12:34

## 2020-02-29 RX ADMIN — APIXABAN 2.5 MG: 2.5 TABLET, FILM COATED ORAL at 12:46

## 2020-02-29 RX ADMIN — TRAZODONE HYDROCHLORIDE 50 MG: 50 TABLET ORAL at 21:45

## 2020-02-29 RX ADMIN — ROPINIROLE HYDROCHLORIDE 2 MG: 1 TABLET, FILM COATED ORAL at 11:10

## 2020-02-29 RX ADMIN — INSULIN LISPRO 1 UNITS: 100 INJECTION, SOLUTION INTRAVENOUS; SUBCUTANEOUS at 21:43

## 2020-02-29 RX ADMIN — ALOGLIPTIN 12.5 MG: 12.5 TABLET, FILM COATED ORAL at 12:52

## 2020-02-29 RX ADMIN — Medication 10 ML: at 21:48

## 2020-02-29 RX ADMIN — ISOSORBIDE MONONITRATE 30 MG: 30 TABLET ORAL at 11:12

## 2020-02-29 RX ADMIN — SENNOSIDES 17.2 MG: 8.6 TABLET, FILM COATED ORAL at 21:44

## 2020-02-29 RX ADMIN — Medication 10 ML: at 11:29

## 2020-02-29 RX ADMIN — METHYLPREDNISOLONE SODIUM SUCCINATE 40 MG: 40 INJECTION, POWDER, FOR SOLUTION INTRAMUSCULAR; INTRAVENOUS at 03:22

## 2020-02-29 RX ADMIN — CEFTRIAXONE SODIUM 1 G: 1 INJECTION, POWDER, FOR SOLUTION INTRAMUSCULAR; INTRAVENOUS at 13:54

## 2020-02-29 RX ADMIN — CARBIDOPA AND LEVODOPA 1 TABLET: 25; 100 TABLET, EXTENDED RELEASE ORAL at 18:20

## 2020-02-29 RX ADMIN — AMIODARONE HYDROCHLORIDE 200 MG: 200 TABLET ORAL at 11:12

## 2020-02-29 RX ADMIN — FERROUS SULFATE TAB EC 325 MG (65 MG FE EQUIVALENT) 325 MG: 325 (65 FE) TABLET DELAYED RESPONSE at 11:11

## 2020-02-29 RX ADMIN — IPRATROPIUM BROMIDE AND ALBUTEROL SULFATE 1 AMPULE: .5; 3 SOLUTION RESPIRATORY (INHALATION) at 20:17

## 2020-02-29 RX ADMIN — ATORVASTATIN CALCIUM 20 MG: 20 TABLET, FILM COATED ORAL at 21:45

## 2020-02-29 RX ADMIN — RASAGILINE 1 MG: 0.5 TABLET ORAL at 11:13

## 2020-02-29 RX ADMIN — INSULIN LISPRO 2 UNITS: 100 INJECTION, SOLUTION INTRAVENOUS; SUBCUTANEOUS at 12:56

## 2020-02-29 RX ADMIN — IPRATROPIUM BROMIDE AND ALBUTEROL SULFATE 1 AMPULE: .5; 3 SOLUTION RESPIRATORY (INHALATION) at 12:19

## 2020-02-29 RX ADMIN — ROPINIROLE HYDROCHLORIDE 2 MG: 1 TABLET, FILM COATED ORAL at 13:55

## 2020-02-29 ASSESSMENT — PAIN SCALES - GENERAL
PAINLEVEL_OUTOF10: 8
PAINLEVEL_OUTOF10: 0
PAINLEVEL_OUTOF10: 0

## 2020-02-29 NOTE — FLOWSHEET NOTE
Patient was sitting up. States she remembers  from previous visits. Patient states about her medical condition. States her  is now in the hospital.  States worries, fears. States treated well.  shared in presence, listening, prayers. Follow up as needed. 02/29/20 1517   Encounter Summary   Services provided to: Patient   Referral/Consult From: [facility]   Support System Spouse   Continue Visiting   (2-29-20)   Complexity of Encounter Moderate   Length of Encounter 15 minutes   Spiritual Assessment Completed Yes   Routine   Type Initial   Spiritual/Adventist   Type Spiritual support   Assessment Calm; Approachable   Intervention Active listening;Explored feelings, thoughts, concerns;Prayer;Discussed belief system/Mormonism practices/mariama;Discussed illness/injury and it's impact   Outcome Expressed gratitude;Receptive;Engaged in conversation;Expressed feelings/needs/concerns

## 2020-02-29 NOTE — H&P
 Right leg swelling    Hematuria    Chest pain, unspecified    Chronic obstructive pulmonary disease with (acute) exacerbation (HCC)       PLAN:    Admit IV steroids IV antibiotics bronchodilators DVT prophylaxis already on anticoagulation will check before meals and at bedtime Accu-Cheks with insulin coverage, home meds reviewed restarted pulmonary consultation see orders        Shawn Russell MD  7:00 PM  2/28/2020

## 2020-02-29 NOTE — PROGRESS NOTES
Pulmonary Critical Care Progress Note    Patient seen for the follow up of COPD exacerbation  Subjective:    She denies chest pain. Mild occasional cough, mostly dry. Shortness of breath is improving. She is sitting in a chair. She has used her CPAP from home. Examination:    Vitals: BP (!) 144/55   Pulse 86   Temp 97.9 °F (36.6 °C) (Oral)   Resp 18   Ht 4' 9\" (1.448 m)   Wt 173 lb 1.6 oz (78.5 kg)   LMP 2004 (Within Years)   SpO2 100%   BMI 37.46 kg/m²   SpO2  Av.4 %  Min: 91 %  Max: 100 %  General appearance: alert and cooperative with exam  Neck: No JVD  Lungs: Decreased breath sound no crackles or wheezing  Heart: regular rate and rhythm, S1, S2 normal, no gallop  Abdomen: Soft, non tender, + BS  Extremities: no cyanosis or clubbing. No significant edema    LABs:    CBC:   Recent Labs     20  0754 20  0635   WBC 6.1 5.3   HGB 9.5* 9.8*   HCT 32.1* 32.6*   PLT 73* 79*     BMP:   Recent Labs     20  0754 20  0635    141   K 3.4* 4.1   CO2 34* 31   BUN 25* 28*   CREATININE 1.51* 1.62*   LABGLOM 34* 31*   GLUCOSE 172* 222*   LIVER PROFILE:  Recent Labs     20  0754   AST 10   ALT 5   LABALBU 4.1       Radiology:    Chest x-ray   Cardiomegaly with vascular congestion is unchanged from prior study.  No   focal consolidation, pneumothorax, large pleural effusion or free air. Grossly stable chronic deformity is seen involving the right humeral head.        Impression:  · Chronic hypoxic respiratory failure  · Acute exacerbation of COPD/30-pack-year smoking history  · Pulmonary edema/chronic diastolic heart failure  · Obstructive sleep apnea/Obesity, noncompliant on CPAP  · KRISTIN on CKD  · A. fib, DM, HLD, HTN,  GERD      Recommendations:  · Oxygen by nasal cannula  · Home CPAP for sleep  · Incentive spirometry every hour while awake   · DuoNeb by nebulizer   · Symbicort 160  · On Lasix check BNP  · Make Solu-Medrol 40 IV every 8 hours  · Zithromax Rocephin · DVT prophylaxis    Alexis Richmond MD, [facility]ll[facility]  Pulmonary Critical Care and Sleep Medicine,  [facility]  Cell: [phone]  Office: [phone]

## 2020-02-29 NOTE — PROGRESS NOTES
Subjective:     Follow-up type 2 diabetes  No polyuria no polydipsia no hypoglycemia blood sugars reviewed  ROS  No fever no chills no GI/ complaints no cardiac complaints less shortness of breath still wheezy occasional tachypnea, no TIA no bleeding no headache no sore throat no skin lesions no fatigue  physical exam  General Appearance: alert and oriented to person, place and time and in no acute distress  Skin: warm and dry, no rash or erythema  Head: normocephalic and atraumatic  Eyes: pupils equal, round, and reactive to light and sclera anicteric    Neck: neck supple and non tender without mass   Pulmonary/Chest: Air entry equal bilateral rhonchi and expiratory wheezing no crackles no use of intercostal muscle  Cardiovascular: normal rate, regular rhythm, normal S1 and S2, no gallops, intact distal pulses and no carotid bruits  Abdomen: soft, non-tender, non-distended, normal bowel sounds, no masses or organomegaly  Extremities: no edema and pulses no Homans sign  Neurologic: Alert oriented x3 with no focal deficit    BP (!) 144/55   Pulse 86   Temp 97.9 °F (36.6 °C) (Oral)   Resp 18   Ht 4' 9\" (1.448 m)   Wt 173 lb 1.6 oz (78.5 kg)   LMP 04/03/2004 (Within Years)   SpO2 100%   BMI 37.46 kg/m²     CBC:   Lab Results   Component Value Date    WBC 5.3 02/29/2020    RBC 3.01 02/29/2020    HGB 9.8 02/29/2020    HCT 32.6 02/29/2020    .3 02/29/2020    MCH 32.6 02/29/2020    MCHC 30.1 02/29/2020    RDW 15.0 02/29/2020    PLT 79 02/29/2020    MPV 10.8 02/29/2020     BMP:    Lab Results   Component Value Date     02/29/2020    K 4.1 02/29/2020    CL 95 02/29/2020    CO2 31 02/29/2020    BUN 28 02/29/2020    LABALBU 4.1 02/28/2020    LABALBU Detected 11/30/2018    CREATININE 1.62 02/29/2020    CALCIUM 8.6 02/29/2020    GFRAA 38 02/29/2020    LABGLOM 31 02/29/2020    GLUCOSE 222 02/29/2020    GLUCOSE 132 03/07/2012        Assessment:  Patient Active Problem List   Diagnosis    Controlled type 2 diabetes mellitus with stage 3 chronic kidney disease, without long-term current use of insulin (HCC)    Hyperlipidemia    Essential hypertension    Chronic leg pain    Paroxysmal atrial fibrillation (HCC)    Asthma    Gastroesophageal reflux disease without esophagitis    ECTOR on CPAP    Type 2 diabetes mellitus with complication, without long-term current use of insulin (HCC)    Spinal stenosis in cervical region    Hypomagnesemia    Hyperphosphaturia    Hypocalcemia    Parkinson's disease ([address]    Acute renal failure (HCC)    Acute cystitis with hematuria    Altered mental status    Iron deficiency anemia due to chronic blood loss    Morbid obesity with BMI of 40.0-44.9, adult (HCC)    Hyperplastic polyp of intestine    Diverticulosis    Panlobular emphysema (HCC)    Rapid atrial fibrillation (HCC)    CKD (chronic kidney disease) stage 3, GFR 30-59 ml/min ([facility]    KRISTIN (acute kidney injury) ([address]    Anemia in chronic kidney disease    Chronic respiratory failure with hypoxia and hypercapnia (HCC)    Acute kidney injury superimposed on chronic kidney disease ([address]    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 coli UTI (urinary tract infection)    Nephrolithiasis    Candidal intertrigo    Venous insufficiency    Malabsorption    Anemia of chronic renal failure, stage 4 (severe) ([facility])    Iron deficiency    Coronary atherosclerosis    COPD exacerbation (HCC)    Restless leg syndrome    SIRS (systemic inflammatory response syndrome) (HCC)    Nausea and vomiting in adult    Bacterial UTI    Odynophagia    Esophageal dysphagia    Candida esophagitis (HCC)    Sepsis due to urinary tract infection (HCC)    Chronic respiratory failure with hypoxia (HCC)    Chronic diastolic congestive heart failure (HCC)    History of ESBL E. coli infection    Stage 4 chronic kidney disease (HCC)    Fever    Macrocytic anemia    Hypercalcemia    Monoclonal gammopathy of undetermined significance    Acute nonintractable headache    Anemia of chronic renal failure, stage 4 (severe) (HCC)    Traumatic closed displaced fracture of proximal end of right humerus, initial encounter    Urinary tract infection without hematuria    Metabolic encephalopathy    Chest pain    [facility]    Right leg swelling    Hematuria    Chest pain, unspecified    Chronic obstructive pulmonary disease with (acute) exacerbation (HCC)   Type 2 diabetes with renal complications  CKD 3  Type 2 diabetes with hyperglycemia  COPD exacerbation  Plan:    Meds labs reviewed continue with before meals and at bedtime Accu-Cheks with insulin coverage avoid nephrotoxic drugs continue with the steroids continue with antibiotics physical therapy occupational therapy continue with anticoagulation      Avani Mackay MD  1:52 PM

## 2020-02-29 NOTE — PLAN OF CARE
Problem: Pain:  Description  Pain management should include both nonpharmacologic and pharmacologic interventions. Goal: Pain level will decrease  Description  Pain level will decrease  Outcome: Ongoing  Goal: Control of acute pain  Description  Control of acute pain  Outcome: Ongoing  Goal: Control of chronic pain  Description  Control of chronic pain  Outcome: Ongoing     Problem: Breathing Pattern - Ineffective:  Goal: Ability to achieve and maintain a regular respiratory rate will improve  Description  Ability to achieve and maintain a regular respiratory rate will improve  Outcome: Ongoing     Problem: Falls - Risk of:  Goal: Will remain free from falls  Description  Will remain free from falls  Outcome: Ongoing  Goal: Absence of physical injury  Description  Absence of physical injury  Outcome: Ongoing     Problem: Risk for Impaired Skin Integrity  Goal: Tissue integrity - skin and mucous membranes  Description  Structural intactness and normal physiological function of skin and  mucous membranes.   Outcome: Ongoing

## 2020-03-01 ENCOUNTER — APPOINTMENT (OUTPATIENT)
Dept: GENERAL RADIOLOGY | Age: 75
DRG: 191 | End: 2020-03-01
Payer: MEDICARE

## 2020-03-01 LAB
ABSOLUTE EOS #: 0 K/UL (ref 0–0.4)
ABSOLUTE IMMATURE GRANULOCYTE: 0.09 K/UL (ref 0–0.3)
ABSOLUTE LYMPH #: 0.36 K/UL (ref 1–4.8)
ABSOLUTE MONO #: 0.27 K/UL (ref 0.2–0.8)
ANION GAP SERPL CALCULATED.3IONS-SCNC: 12 MMOL/L (ref 9–17)
BASOPHILS # BLD: 0 %
BASOPHILS ABSOLUTE: 0 K/UL (ref 0–0.2)
BNP INTERPRETATION: ABNORMAL
BUN BLDV-MCNC: 44 MG/DL (ref 8–23)
BUN/CREAT BLD: 24 (ref 9–20)
CALCIUM SERPL-MCNC: 8.4 MG/DL (ref 8.6–10.4)
CHLORIDE BLD-SCNC: 95 MMOL/L (ref 98–107)
CO2: 32 MMOL/L (ref 20–31)
CREAT SERPL-MCNC: 1.84 MG/DL (ref 0.5–0.9)
DIFFERENTIAL TYPE: ABNORMAL
DIRECT EXAM: NORMAL
EOSINOPHILS RELATIVE PERCENT: 0 % (ref 1–4)
ESTIMATED AVERAGE GLUCOSE: 123 MG/DL
GFR AFRICAN AMERICAN: 33 ML/MIN
GFR NON-AFRICAN AMERICAN: 27 ML/MIN
GFR SERPL CREATININE-BSD FRML MDRD: ABNORMAL ML/MIN/{1.73_M2}
GFR SERPL CREATININE-BSD FRML MDRD: ABNORMAL ML/MIN/{1.73_M2}
GLUCOSE BLD-MCNC: 153 MG/DL (ref 65–105)
GLUCOSE BLD-MCNC: 169 MG/DL (ref 65–105)
GLUCOSE BLD-MCNC: 184 MG/DL (ref 70–99)
GLUCOSE BLD-MCNC: 233 MG/DL (ref 65–105)
GLUCOSE BLD-MCNC: 289 MG/DL (ref 65–105)
HBA1C MFR BLD: 5.9 % (ref 4–6)
HCT VFR BLD CALC: 31.5 % (ref 36.3–47.1)
HEMOGLOBIN: 9.3 G/DL (ref 11.9–15.1)
IMMATURE GRANULOCYTES: 1 %
LYMPHOCYTES # BLD: 4 % (ref 24–44)
Lab: NORMAL
MCH RBC QN AUTO: 32.3 PG (ref 25.2–33.5)
MCHC RBC AUTO-ENTMCNC: 29.5 G/DL (ref 28.4–34.8)
MCV RBC AUTO: 109.4 FL (ref 82.6–102.9)
MONOCYTES # BLD: 3 % (ref 1–7)
MORPHOLOGY: ABNORMAL
NRBC AUTOMATED: 0 PER 100 WBC
PDW BLD-RTO: 15.1 % (ref 11.8–14.4)
PLATELET # BLD: 103 K/UL (ref 138–453)
PLATELET ESTIMATE: ABNORMAL
PMV BLD AUTO: 10.9 FL (ref 8.1–13.5)
POTASSIUM SERPL-SCNC: 4.5 MMOL/L (ref 3.7–5.3)
PRO-BNP: 1263 PG/ML
RBC # BLD: 2.88 M/UL (ref 3.95–5.11)
RBC # BLD: ABNORMAL 10*6/UL
SEG NEUTROPHILS: 92 % (ref 36–66)
SEGMENTED NEUTROPHILS ABSOLUTE COUNT: 8.28 K/UL (ref 1.8–7.7)
SODIUM BLD-SCNC: 139 MMOL/L (ref 135–144)
SPECIMEN DESCRIPTION: NORMAL
WBC # BLD: 9 K/UL (ref 3.5–11.3)
WBC # BLD: ABNORMAL 10*3/UL

## 2020-03-01 PROCEDURE — 83880 ASSAY OF NATRIURETIC PEPTIDE: CPT

## 2020-03-01 PROCEDURE — 85025 COMPLETE CBC W/AUTO DIFF WBC: CPT

## 2020-03-01 PROCEDURE — 36415 COLL VENOUS BLD VENIPUNCTURE: CPT

## 2020-03-01 PROCEDURE — 6360000002 HC RX W HCPCS: Performed by: INTERNAL MEDICINE

## 2020-03-01 PROCEDURE — 6360000002 HC RX W HCPCS: Performed by: NURSE PRACTITIONER

## 2020-03-01 PROCEDURE — 6370000000 HC RX 637 (ALT 250 FOR IP): Performed by: INTERNAL MEDICINE

## 2020-03-01 PROCEDURE — 1200000000 HC SEMI PRIVATE

## 2020-03-01 PROCEDURE — 87899 AGENT NOS ASSAY W/OPTIC: CPT

## 2020-03-01 PROCEDURE — 6370000000 HC RX 637 (ALT 250 FOR IP): Performed by: EMERGENCY MEDICINE

## 2020-03-01 PROCEDURE — 71045 X-RAY EXAM CHEST 1 VIEW: CPT

## 2020-03-01 PROCEDURE — 94761 N-INVAS EAR/PLS OXIMETRY MLT: CPT

## 2020-03-01 PROCEDURE — 94640 AIRWAY INHALATION TREATMENT: CPT

## 2020-03-01 PROCEDURE — 82947 ASSAY GLUCOSE BLOOD QUANT: CPT

## 2020-03-01 PROCEDURE — 2580000003 HC RX 258: Performed by: EMERGENCY MEDICINE

## 2020-03-01 PROCEDURE — 6370000000 HC RX 637 (ALT 250 FOR IP): Performed by: NURSE PRACTITIONER

## 2020-03-01 PROCEDURE — 80048 BASIC METABOLIC PNL TOTAL CA: CPT

## 2020-03-01 PROCEDURE — 2580000003 HC RX 258: Performed by: NURSE PRACTITIONER

## 2020-03-01 PROCEDURE — 2700000000 HC OXYGEN THERAPY PER DAY

## 2020-03-01 RX ORDER — GUAIFENESIN 600 MG/1
600 TABLET, EXTENDED RELEASE ORAL 2 TIMES DAILY
Status: DISCONTINUED | OUTPATIENT
Start: 2020-03-02 | End: 2020-03-04 | Stop reason: HOSPADM

## 2020-03-01 RX ORDER — METHYLPREDNISOLONE SODIUM SUCCINATE 40 MG/ML
40 INJECTION, POWDER, LYOPHILIZED, FOR SOLUTION INTRAMUSCULAR; INTRAVENOUS ONCE
Status: COMPLETED | OUTPATIENT
Start: 2020-03-01 | End: 2020-03-01

## 2020-03-01 RX ADMIN — GUAIFENESIN 600 MG: 600 TABLET, EXTENDED RELEASE ORAL at 23:39

## 2020-03-01 RX ADMIN — INSULIN LISPRO 1 UNITS: 100 INJECTION, SOLUTION INTRAVENOUS; SUBCUTANEOUS at 21:43

## 2020-03-01 RX ADMIN — METOPROLOL TARTRATE 25 MG: 25 TABLET ORAL at 08:49

## 2020-03-01 RX ADMIN — BUDESONIDE AND FORMOTEROL FUMARATE DIHYDRATE 2 PUFF: 160; 4.5 AEROSOL RESPIRATORY (INHALATION) at 20:55

## 2020-03-01 RX ADMIN — ISOSORBIDE MONONITRATE 30 MG: 30 TABLET ORAL at 08:48

## 2020-03-01 RX ADMIN — METHYLPREDNISOLONE SODIUM SUCCINATE 40 MG: 40 INJECTION, POWDER, FOR SOLUTION INTRAMUSCULAR; INTRAVENOUS at 15:40

## 2020-03-01 RX ADMIN — CALCITRIOL 0.75 MCG: 0.25 CAPSULE ORAL at 08:48

## 2020-03-01 RX ADMIN — IPRATROPIUM BROMIDE AND ALBUTEROL SULFATE 1 AMPULE: .5; 3 SOLUTION RESPIRATORY (INHALATION) at 05:52

## 2020-03-01 RX ADMIN — IPRATROPIUM BROMIDE AND ALBUTEROL SULFATE 1 AMPULE: .5; 3 SOLUTION RESPIRATORY (INHALATION) at 20:55

## 2020-03-01 RX ADMIN — FERROUS SULFATE TAB EC 325 MG (65 MG FE EQUIVALENT) 325 MG: 325 (65 FE) TABLET DELAYED RESPONSE at 17:07

## 2020-03-01 RX ADMIN — Medication 10 ML: at 20:18

## 2020-03-01 RX ADMIN — APIXABAN 5 MG: 5 TABLET, FILM COATED ORAL at 20:15

## 2020-03-01 RX ADMIN — METHYLPREDNISOLONE SODIUM SUCCINATE 40 MG: 40 INJECTION, POWDER, FOR SOLUTION INTRAMUSCULAR; INTRAVENOUS at 05:34

## 2020-03-01 RX ADMIN — ACETAMINOPHEN 650 MG: 325 TABLET ORAL at 20:24

## 2020-03-01 RX ADMIN — ALOGLIPTIN 12.5 MG: 12.5 TABLET, FILM COATED ORAL at 10:44

## 2020-03-01 RX ADMIN — CARBIDOPA AND LEVODOPA 1 TABLET: 25; 100 TABLET, EXTENDED RELEASE ORAL at 08:48

## 2020-03-01 RX ADMIN — PANTOPRAZOLE SODIUM 40 MG: 40 TABLET, DELAYED RELEASE ORAL at 05:34

## 2020-03-01 RX ADMIN — METHYLPREDNISOLONE SODIUM SUCCINATE 40 MG: 40 INJECTION, POWDER, FOR SOLUTION INTRAMUSCULAR; INTRAVENOUS at 20:15

## 2020-03-01 RX ADMIN — TRAZODONE HYDROCHLORIDE 50 MG: 50 TABLET ORAL at 20:15

## 2020-03-01 RX ADMIN — CARBIDOPA AND LEVODOPA 1 TABLET: 25; 100 TABLET, EXTENDED RELEASE ORAL at 20:15

## 2020-03-01 RX ADMIN — IPRATROPIUM BROMIDE AND ALBUTEROL SULFATE 1 AMPULE: .5; 3 SOLUTION RESPIRATORY (INHALATION) at 10:47

## 2020-03-01 RX ADMIN — RASAGILINE 1 MG: 0.5 TABLET ORAL at 08:48

## 2020-03-01 RX ADMIN — ROPINIROLE HYDROCHLORIDE 2 MG: 1 TABLET, FILM COATED ORAL at 20:15

## 2020-03-01 RX ADMIN — AZITHROMYCIN MONOHYDRATE 500 MG: 500 INJECTION, POWDER, LYOPHILIZED, FOR SOLUTION INTRAVENOUS at 08:49

## 2020-03-01 RX ADMIN — INSULIN LISPRO 1 UNITS: 100 INJECTION, SOLUTION INTRAVENOUS; SUBCUTANEOUS at 17:06

## 2020-03-01 RX ADMIN — CALCIUM ACETATE 667 MG: 667 CAPSULE ORAL at 12:07

## 2020-03-01 RX ADMIN — SENNOSIDES 17.2 MG: 8.6 TABLET, FILM COATED ORAL at 20:24

## 2020-03-01 RX ADMIN — IPRATROPIUM BROMIDE AND ALBUTEROL SULFATE 1 AMPULE: .5; 3 SOLUTION RESPIRATORY (INHALATION) at 15:09

## 2020-03-01 RX ADMIN — CALCIUM ACETATE 667 MG: 667 CAPSULE ORAL at 17:06

## 2020-03-01 RX ADMIN — INSULIN LISPRO 1 UNITS: 100 INJECTION, SOLUTION INTRAVENOUS; SUBCUTANEOUS at 08:51

## 2020-03-01 RX ADMIN — Medication 10 ML: at 08:50

## 2020-03-01 RX ADMIN — ATORVASTATIN CALCIUM 20 MG: 20 TABLET, FILM COATED ORAL at 20:16

## 2020-03-01 RX ADMIN — CARBIDOPA AND LEVODOPA 1 TABLET: 25; 100 TABLET, EXTENDED RELEASE ORAL at 17:07

## 2020-03-01 RX ADMIN — ACETAMINOPHEN 650 MG: 325 TABLET ORAL at 09:38

## 2020-03-01 RX ADMIN — ROPINIROLE HYDROCHLORIDE 2 MG: 1 TABLET, FILM COATED ORAL at 15:40

## 2020-03-01 RX ADMIN — APIXABAN 2.5 MG: 2.5 TABLET, FILM COATED ORAL at 08:50

## 2020-03-01 RX ADMIN — AMIODARONE HYDROCHLORIDE 200 MG: 200 TABLET ORAL at 08:48

## 2020-03-01 RX ADMIN — CEFTRIAXONE SODIUM 1 G: 1 INJECTION, POWDER, FOR SOLUTION INTRAMUSCULAR; INTRAVENOUS at 15:40

## 2020-03-01 RX ADMIN — ASPIRIN 81 MG: 81 TABLET, COATED ORAL at 08:51

## 2020-03-01 RX ADMIN — ROPINIROLE HYDROCHLORIDE 2 MG: 1 TABLET, FILM COATED ORAL at 08:51

## 2020-03-01 RX ADMIN — BUDESONIDE AND FORMOTEROL FUMARATE DIHYDRATE 2 PUFF: 160; 4.5 AEROSOL RESPIRATORY (INHALATION) at 05:52

## 2020-03-01 RX ADMIN — CALCIUM ACETATE 667 MG: 667 CAPSULE ORAL at 08:48

## 2020-03-01 RX ADMIN — CARBIDOPA AND LEVODOPA 1 TABLET: 25; 100 TABLET, EXTENDED RELEASE ORAL at 12:07

## 2020-03-01 RX ADMIN — FERROUS SULFATE TAB EC 325 MG (65 MG FE EQUIVALENT) 325 MG: 325 (65 FE) TABLET DELAYED RESPONSE at 08:49

## 2020-03-01 RX ADMIN — INSULIN LISPRO 3 UNITS: 100 INJECTION, SOLUTION INTRAVENOUS; SUBCUTANEOUS at 12:07

## 2020-03-01 RX ADMIN — FUROSEMIDE 20 MG: 20 TABLET ORAL at 08:49

## 2020-03-01 ASSESSMENT — PAIN DESCRIPTION - LOCATION: LOCATION: ARM;HEAD

## 2020-03-01 ASSESSMENT — PAIN - FUNCTIONAL ASSESSMENT: PAIN_FUNCTIONAL_ASSESSMENT: PREVENTS OR INTERFERES SOME ACTIVE ACTIVITIES AND ADLS

## 2020-03-01 ASSESSMENT — PAIN DESCRIPTION - DESCRIPTORS: DESCRIPTORS: HEADACHE;POUNDING

## 2020-03-01 ASSESSMENT — PAIN DESCRIPTION - PAIN TYPE: TYPE: ACUTE PAIN

## 2020-03-01 ASSESSMENT — PAIN SCALES - GENERAL
PAINLEVEL_OUTOF10: 1
PAINLEVEL_OUTOF10: 3
PAINLEVEL_OUTOF10: 0
PAINLEVEL_OUTOF10: 3
PAINLEVEL_OUTOF10: 0

## 2020-03-01 ASSESSMENT — PAIN DESCRIPTION - PROGRESSION: CLINICAL_PROGRESSION: NOT CHANGED

## 2020-03-01 ASSESSMENT — PAIN DESCRIPTION - FREQUENCY: FREQUENCY: CONTINUOUS

## 2020-03-01 ASSESSMENT — PAIN DESCRIPTION - ONSET: ONSET: ON-GOING

## 2020-03-01 NOTE — DISCHARGE INSTR - COC
renal failure, stage 4 (severe) ([facility]) N18.4, D63.1    Traumatic closed displaced fracture of proximal end of right humerus, initial encounter S42.201A    Urinary tract infection without hematuria X44.5    Metabolic encephalopathy P97.22    Chest pain R07.9    Acute kidney injury ([address] N17.9    Right leg swelling M79.89    Hematuria R31.9    Chest pain, unspecified R07.9    Chronic obstructive pulmonary disease with (acute) exacerbation ([facility]) J44.1       Isolation/Infection:   Isolation          Contact        Patient Infection Status     Infection Onset Added Last Indicated Last Indicated By Review Planned Expiration Resolved Resolved By    Respiratory Syncytial Virus (RSV) 02/28/20 02/29/20 02/28/20 Respiratory Virus PCR Panel 03/07/20       ESBL (Extended Spectrum Beta Lactamase)  03/10/18 10/25/19 Urine culture via catheter        E.  Coli - urine 12/2019            Nurse Assessment:  Last Vital Signs: BP (!) 153/49   Pulse 89   Temp 97.5 °F (36.4 °C) (Oral)   Resp 18   Ht 4' 9\" (1.448 m)   Wt 173 lb 1.6 oz (78.5 kg)   LMP 04/03/2004 (Within Years)   SpO2 92%   BMI 37.46 kg/m²     Last documented pain score (0-10 scale): Pain Level: 1  Last Weight:   Wt Readings from Last 1 Encounters:   02/28/20 173 lb 1.6 oz (78.5 kg)     Mental Status:  oriented, alert, coherent, logical, thought processes intact and able to concentrate and follow conversation    IV Access:  - None    Nursing Mobility/ADLs:  Walking   Assisted  Transfer  Assisted  Bathing  Assisted  Dressing  Assisted  Toileting  Assisted  Feeding  Independent  Med [address]  Med Delivery   whole    Wound Care Documentation and Therapy:  Wound 05/30/19 Coccyx Upper small \"slit\" toward bottom of coccyx/present on admission (Active)   Number of days: 276       Wound 06/05/19 Heel Right (Active)   Number of days: 269       Wound 06/05/19 Thigh Medial;Left (Active)   Number of days: 269        Elimination:  Continence:   · Bowel:

## 2020-03-01 NOTE — PROGRESS NOTES
Ref Range: Not Detected  Not Detected   Coronavirus NL63 PCR Latest Ref Range: Not Detected  Not Detected   Coronavirus OC Latest Ref Range: Not Detected  Not Detected   Influenza A by PCR Latest Ref Range: Not Detected  Not Detected   Influenza A H1 (2009) PCR Latest Ref Range: Not Detected  NOT REPORTED   Influenza A H3 PCR Latest Ref Range: Not Detected  NOT REPORTED   Influenza B by PCR Latest Ref Range: Not Detected  Not Detected   Parainfluenza 1 PCR Latest Ref Range: Not Detected  Not Detected   Parainfluenza 2 PCR Latest Ref Range: Not Detected  Not Detected   Parainfluenza 3 PCR Latest Ref Range: Not Detected  Not Detected   Parainfluenza 4 PCR Latest Ref Range: Not Detected  Not Detected   Resp Syncytial Virus PCR Latest Ref Range: Not Detected  DETECTED (A)   Rhino/Enterovirus PCR Latest Ref Range: Not Detected  Not Detected   Mycoplasma pneumo by PCR Latest Ref Range: Not Detected  Not Detected       Radiology:    Chest x-ray 3/1  Cardiomegaly with vascular congestion is unchanged from the prior study.  No   focal consolidation, pneumothorax, large pleural effusion or free air.  Once   again demonstrates chronic deformity right humeral head         Impression:  · Chronic hypoxic respiratory failure  · Acute exacerbation of COPD  · RSV tracheobronchitis  · Pulmonary edema/chronic diastolic heart failure  · Obstructive sleep apnea/Obesity, noncompliant on CPAP  · KRISTIN on CKD  · A. fib, DM, HLD, HTN,  GERD      Recommendations:  · Oxygen by nasal cannula  · Home CPAP for sleep  · Incentive spirometry every hour while awake   ·  DuoNeb by nebulizer every 4 hours while awake  · Symbicort 160  · Solu-Medrol 40 IV every 8 hours  · Solu-Medrol 40 IV extra dose  · Zithromax Rocephin   · On Lasix  · Consider nephrology consult  · Acetylcholine antibody  · DVT prophylaxis    Mynor Beltran MD, [facility]  Pulmonary Critical Care and Sleep Medicine,  [facility]  Cell: [phone]  Office: [phone]

## 2020-03-01 NOTE — PROGRESS NOTES
Subjective:     Follow-up type 2 diabetes  No polyuria no polydipsia no hypoglycemia blood sugars reviewed  ROS  No fever no chills no GI/ complaints no cardiac complaints still complaining of cough wheezing and shortness of breath tachypnea, no TIA no bleeding no headache no sore throat no skin lesions  physical exam  General Appearance: alert and oriented to person, place and time and in no acute distress  Skin: warm and dry, no rash or erythema  Head: normocephalic and atraumatic  Eyes: pupils equal, round, and reactive to light and sclera anicteric  Neck: neck supple and non tender without mass   Pulmonary/Chest: Air entry equal few rhonchi mild expiratory wheezing no crackles no use of intercostal muscles  Cardiovascular: normal rate, regular rhythm, normal S1 and S2, no gallops, intact distal pulses and no carotid bruits  Abdomen: soft, non-tender, non-distended, normal bowel sounds, no masses or organomegaly  Extremities: no edema and good pulses no Homans    Neurologic: Alert oriented x3 with no focal deficit    BP (!) 153/49   Pulse 89   Temp 97.5 °F (36.4 °C) (Oral)   Resp 18   Ht 4' 9\" (1.448 m)   Wt 173 lb 1.6 oz (78.5 kg)   LMP 04/03/2004 (Within Years)   SpO2 92%   BMI 37.46 kg/m²     CBC:   Lab Results   Component Value Date    WBC 9.0 03/01/2020    RBC 2.88 03/01/2020    HGB 9.3 03/01/2020    HCT 31.5 03/01/2020    .4 03/01/2020    MCH 32.3 03/01/2020    MCHC 29.5 03/01/2020    RDW 15.1 03/01/2020     03/01/2020    MPV 10.9 03/01/2020     CMP:    Lab Results   Component Value Date     03/01/2020    K 4.5 03/01/2020    CL 95 03/01/2020    CO2 32 03/01/2020    BUN 44 03/01/2020    CREATININE 1.84 03/01/2020    GFRAA 33 03/01/2020    LABGLOM 27 03/01/2020    GLUCOSE 184 03/01/2020    GLUCOSE 132 03/07/2012    PROT 7.2 02/28/2020    LABALBU 4.1 02/28/2020    LABALBU Detected 11/30/2018    CALCIUM 8.4 03/01/2020    BILITOT 0.18 02/28/2020    ALKPHOS 63 02/28/2020    AST 10

## 2020-03-01 NOTE — PLAN OF CARE
Problem: Pain:  Goal: Pain level will decrease  Description  Pain level will decrease  2/29/2020 2340 by Clari Castro RN  Outcome: Ongoing  2/29/2020 1822 by Rolly Munguia RN  Outcome: Ongoing  Goal: Control of acute pain  Description  Control of acute pain  2/29/2020 2340 by Clari Castro RN  Outcome: Ongoing  2/29/2020 1822 by Rolly Munguia RN  Outcome: Ongoing  Goal: Control of chronic pain  Description  Control of chronic pain  2/29/2020 2340 by Clari Castro RN  Outcome: Ongoing  2/29/2020 1822 by Rolly Munguia RN  Outcome: Ongoing     Problem: Breathing Pattern - Ineffective:  Goal: Ability to achieve and maintain a regular respiratory rate will improve  Description  Ability to achieve and maintain a regular respiratory rate will improve  2/29/2020 2340 by Clari Castro RN  Outcome: Ongoing  2/29/2020 1822 by Rolly Munguia RN  Outcome: Ongoing     Problem: Falls - Risk of:  Goal: Will remain free from falls  Description  Will remain free from falls  2/29/2020 2340 by Clari Castro RN  Outcome: Ongoing  2/29/2020 1822 by Rolly Munguia RN  Outcome: Ongoing  Goal: Absence of physical injury  Description  Absence of physical injury  2/29/2020 2340 by Clari Castro RN  Outcome: Ongoing  2/29/2020 1822 by Rolly Munguia RN  Outcome: Ongoing     Problem: Risk for Impaired Skin Integrity  Goal: Tissue integrity - skin and mucous membranes  Description  Structural intactness and normal physiological function of skin and  mucous membranes.   2/29/2020 2340 by Clari Castro RN  Outcome: Ongoing  2/29/2020 1822 by Rolly Munguia RN  Outcome: Ongoing

## 2020-03-02 LAB
ABSOLUTE EOS #: 0 K/UL (ref 0–0.4)
ABSOLUTE IMMATURE GRANULOCYTE: 0.19 K/UL (ref 0–0.3)
ABSOLUTE LYMPH #: 0.38 K/UL (ref 1–4.8)
ABSOLUTE MONO #: 0.28 K/UL (ref 0.2–0.8)
ANION GAP SERPL CALCULATED.3IONS-SCNC: 15 MMOL/L (ref 9–17)
BASOPHILS # BLD: 0 %
BASOPHILS ABSOLUTE: 0 K/UL (ref 0–0.2)
BUN BLDV-MCNC: 46 MG/DL (ref 8–23)
BUN/CREAT BLD: 28 (ref 9–20)
CALCIUM SERPL-MCNC: 8.4 MG/DL (ref 8.6–10.4)
CHLORIDE BLD-SCNC: 95 MMOL/L (ref 98–107)
CO2: 30 MMOL/L (ref 20–31)
CREAT SERPL-MCNC: 1.65 MG/DL (ref 0.5–0.9)
DIFFERENTIAL TYPE: ABNORMAL
EOSINOPHILS RELATIVE PERCENT: 0 % (ref 1–4)
GFR AFRICAN AMERICAN: 37 ML/MIN
GFR NON-AFRICAN AMERICAN: 31 ML/MIN
GFR SERPL CREATININE-BSD FRML MDRD: ABNORMAL ML/MIN/{1.73_M2}
GFR SERPL CREATININE-BSD FRML MDRD: ABNORMAL ML/MIN/{1.73_M2}
GLUCOSE BLD-MCNC: 158 MG/DL (ref 65–105)
GLUCOSE BLD-MCNC: 177 MG/DL (ref 65–105)
GLUCOSE BLD-MCNC: 178 MG/DL (ref 65–105)
GLUCOSE BLD-MCNC: 206 MG/DL (ref 65–105)
GLUCOSE BLD-MCNC: 208 MG/DL (ref 65–105)
GLUCOSE BLD-MCNC: 229 MG/DL (ref 70–99)
HCT VFR BLD CALC: 31.6 % (ref 36.3–47.1)
HEMOGLOBIN: 9.4 G/DL (ref 11.9–15.1)
IMMATURE GRANULOCYTES: 2 %
LYMPHOCYTES # BLD: 4 % (ref 24–44)
MCH RBC QN AUTO: 32.3 PG (ref 25.2–33.5)
MCHC RBC AUTO-ENTMCNC: 29.7 G/DL (ref 28.4–34.8)
MCV RBC AUTO: 108.6 FL (ref 82.6–102.9)
MONOCYTES # BLD: 3 % (ref 1–7)
MORPHOLOGY: ABNORMAL
MYCOPLASMA PNEUMONIAE IGM: 0.35
NRBC AUTOMATED: 0 PER 100 WBC
PDW BLD-RTO: 15 % (ref 11.8–14.4)
PLATELET # BLD: 110 K/UL (ref 138–453)
PLATELET ESTIMATE: ABNORMAL
PMV BLD AUTO: 11.2 FL (ref 8.1–13.5)
POTASSIUM SERPL-SCNC: 4.2 MMOL/L (ref 3.7–5.3)
RBC # BLD: 2.91 M/UL (ref 3.95–5.11)
RBC # BLD: ABNORMAL 10*6/UL
SEG NEUTROPHILS: 91 % (ref 36–66)
SEGMENTED NEUTROPHILS ABSOLUTE COUNT: 8.55 K/UL (ref 1.8–7.7)
SODIUM BLD-SCNC: 140 MMOL/L (ref 135–144)
WBC # BLD: 9.4 K/UL (ref 3.5–11.3)
WBC # BLD: ABNORMAL 10*3/UL

## 2020-03-02 PROCEDURE — 6370000000 HC RX 637 (ALT 250 FOR IP): Performed by: INTERNAL MEDICINE

## 2020-03-02 PROCEDURE — 97162 PT EVAL MOD COMPLEX 30 MIN: CPT

## 2020-03-02 PROCEDURE — 6360000002 HC RX W HCPCS: Performed by: INTERNAL MEDICINE

## 2020-03-02 PROCEDURE — 2580000003 HC RX 258: Performed by: NURSE PRACTITIONER

## 2020-03-02 PROCEDURE — 80048 BASIC METABOLIC PNL TOTAL CA: CPT

## 2020-03-02 PROCEDURE — 6370000000 HC RX 637 (ALT 250 FOR IP): Performed by: NURSE PRACTITIONER

## 2020-03-02 PROCEDURE — 97530 THERAPEUTIC ACTIVITIES: CPT

## 2020-03-02 PROCEDURE — 2580000003 HC RX 258: Performed by: EMERGENCY MEDICINE

## 2020-03-02 PROCEDURE — 83519 RIA NONANTIBODY: CPT

## 2020-03-02 PROCEDURE — 6360000002 HC RX W HCPCS: Performed by: NURSE PRACTITIONER

## 2020-03-02 PROCEDURE — 1200000000 HC SEMI PRIVATE

## 2020-03-02 PROCEDURE — 97110 THERAPEUTIC EXERCISES: CPT

## 2020-03-02 PROCEDURE — 85025 COMPLETE CBC W/AUTO DIFF WBC: CPT

## 2020-03-02 PROCEDURE — 94640 AIRWAY INHALATION TREATMENT: CPT

## 2020-03-02 PROCEDURE — 36415 COLL VENOUS BLD VENIPUNCTURE: CPT

## 2020-03-02 RX ORDER — FUROSEMIDE 10 MG/ML
40 INJECTION INTRAMUSCULAR; INTRAVENOUS ONCE
Status: COMPLETED | OUTPATIENT
Start: 2020-03-02 | End: 2020-03-02

## 2020-03-02 RX ORDER — BUDESONIDE 0.5 MG/2ML
0.5 INHALANT ORAL 2 TIMES DAILY
Status: DISCONTINUED | OUTPATIENT
Start: 2020-03-02 | End: 2020-03-04 | Stop reason: HOSPADM

## 2020-03-02 RX ADMIN — GUAIFENESIN 600 MG: 600 TABLET, EXTENDED RELEASE ORAL at 21:26

## 2020-03-02 RX ADMIN — Medication 10 ML: at 21:28

## 2020-03-02 RX ADMIN — METHYLPREDNISOLONE SODIUM SUCCINATE 40 MG: 40 INJECTION, POWDER, FOR SOLUTION INTRAMUSCULAR; INTRAVENOUS at 13:37

## 2020-03-02 RX ADMIN — CALCIUM ACETATE 667 MG: 667 CAPSULE ORAL at 08:10

## 2020-03-02 RX ADMIN — AMIODARONE HYDROCHLORIDE 200 MG: 200 TABLET ORAL at 08:11

## 2020-03-02 RX ADMIN — ROPINIROLE HYDROCHLORIDE 2 MG: 1 TABLET, FILM COATED ORAL at 08:34

## 2020-03-02 RX ADMIN — INSULIN LISPRO 1 UNITS: 100 INJECTION, SOLUTION INTRAVENOUS; SUBCUTANEOUS at 11:55

## 2020-03-02 RX ADMIN — ALBUTEROL SULFATE 2.5 MG: 2.5 SOLUTION RESPIRATORY (INHALATION) at 20:25

## 2020-03-02 RX ADMIN — ISOSORBIDE MONONITRATE 30 MG: 30 TABLET ORAL at 08:10

## 2020-03-02 RX ADMIN — INSULIN LISPRO 1 UNITS: 100 INJECTION, SOLUTION INTRAVENOUS; SUBCUTANEOUS at 16:56

## 2020-03-02 RX ADMIN — CARBIDOPA AND LEVODOPA 1 TABLET: 25; 100 TABLET, EXTENDED RELEASE ORAL at 21:25

## 2020-03-02 RX ADMIN — CEFTRIAXONE SODIUM 1 G: 1 INJECTION, POWDER, FOR SOLUTION INTRAMUSCULAR; INTRAVENOUS at 13:49

## 2020-03-02 RX ADMIN — METHYLPREDNISOLONE SODIUM SUCCINATE 40 MG: 40 INJECTION, POWDER, FOR SOLUTION INTRAMUSCULAR; INTRAVENOUS at 05:49

## 2020-03-02 RX ADMIN — CARBIDOPA AND LEVODOPA 1 TABLET: 25; 100 TABLET, EXTENDED RELEASE ORAL at 11:56

## 2020-03-02 RX ADMIN — CARBIDOPA AND LEVODOPA 1 TABLET: 25; 100 TABLET, EXTENDED RELEASE ORAL at 08:11

## 2020-03-02 RX ADMIN — CALCIUM ACETATE 667 MG: 667 CAPSULE ORAL at 11:56

## 2020-03-02 RX ADMIN — IPRATROPIUM BROMIDE AND ALBUTEROL SULFATE 1 AMPULE: .5; 3 SOLUTION RESPIRATORY (INHALATION) at 15:53

## 2020-03-02 RX ADMIN — BUDESONIDE AND FORMOTEROL FUMARATE DIHYDRATE 2 PUFF: 160; 4.5 AEROSOL RESPIRATORY (INHALATION) at 06:15

## 2020-03-02 RX ADMIN — METOPROLOL TARTRATE 25 MG: 25 TABLET ORAL at 08:11

## 2020-03-02 RX ADMIN — GUAIFENESIN 600 MG: 600 TABLET, EXTENDED RELEASE ORAL at 08:10

## 2020-03-02 RX ADMIN — ASPIRIN 81 MG: 81 TABLET, COATED ORAL at 08:09

## 2020-03-02 RX ADMIN — RASAGILINE 1 MG: 0.5 TABLET ORAL at 08:10

## 2020-03-02 RX ADMIN — APIXABAN 5 MG: 5 TABLET, FILM COATED ORAL at 21:25

## 2020-03-02 RX ADMIN — ALBUTEROL SULFATE 2.5 MG: 2.5 SOLUTION RESPIRATORY (INHALATION) at 01:55

## 2020-03-02 RX ADMIN — TRAZODONE HYDROCHLORIDE 50 MG: 50 TABLET ORAL at 21:25

## 2020-03-02 RX ADMIN — IPRATROPIUM BROMIDE AND ALBUTEROL SULFATE 1 AMPULE: .5; 3 SOLUTION RESPIRATORY (INHALATION) at 10:58

## 2020-03-02 RX ADMIN — ROPINIROLE HYDROCHLORIDE 2 MG: 1 TABLET, FILM COATED ORAL at 21:25

## 2020-03-02 RX ADMIN — FUROSEMIDE 40 MG: 10 INJECTION, SOLUTION INTRAMUSCULAR; INTRAVENOUS at 19:10

## 2020-03-02 RX ADMIN — CALCIUM ACETATE 667 MG: 667 CAPSULE ORAL at 16:56

## 2020-03-02 RX ADMIN — INSULIN LISPRO 1 UNITS: 100 INJECTION, SOLUTION INTRAVENOUS; SUBCUTANEOUS at 08:08

## 2020-03-02 RX ADMIN — SENNOSIDES 17.2 MG: 8.6 TABLET, FILM COATED ORAL at 21:25

## 2020-03-02 RX ADMIN — Medication 10 ML: at 08:09

## 2020-03-02 RX ADMIN — BUDESONIDE 500 MCG: 0.5 SUSPENSION RESPIRATORY (INHALATION) at 20:25

## 2020-03-02 RX ADMIN — FERROUS SULFATE TAB EC 325 MG (65 MG FE EQUIVALENT) 325 MG: 325 (65 FE) TABLET DELAYED RESPONSE at 16:56

## 2020-03-02 RX ADMIN — CALCITRIOL 0.75 MCG: 0.25 CAPSULE ORAL at 08:09

## 2020-03-02 RX ADMIN — APIXABAN 5 MG: 5 TABLET, FILM COATED ORAL at 08:10

## 2020-03-02 RX ADMIN — ROPINIROLE HYDROCHLORIDE 2 MG: 1 TABLET, FILM COATED ORAL at 13:38

## 2020-03-02 RX ADMIN — PANTOPRAZOLE SODIUM 40 MG: 40 TABLET, DELAYED RELEASE ORAL at 05:49

## 2020-03-02 RX ADMIN — ATORVASTATIN CALCIUM 20 MG: 20 TABLET, FILM COATED ORAL at 21:25

## 2020-03-02 RX ADMIN — METHYLPREDNISOLONE SODIUM SUCCINATE 40 MG: 40 INJECTION, POWDER, FOR SOLUTION INTRAMUSCULAR; INTRAVENOUS at 21:26

## 2020-03-02 RX ADMIN — CARBIDOPA AND LEVODOPA 1 TABLET: 25; 100 TABLET, EXTENDED RELEASE ORAL at 16:56

## 2020-03-02 RX ADMIN — FERROUS SULFATE TAB EC 325 MG (65 MG FE EQUIVALENT) 325 MG: 325 (65 FE) TABLET DELAYED RESPONSE at 08:11

## 2020-03-02 RX ADMIN — ALOGLIPTIN 12.5 MG: 12.5 TABLET, FILM COATED ORAL at 08:34

## 2020-03-02 RX ADMIN — AZITHROMYCIN MONOHYDRATE 500 MG: 500 INJECTION, POWDER, LYOPHILIZED, FOR SOLUTION INTRAVENOUS at 08:09

## 2020-03-02 RX ADMIN — IPRATROPIUM BROMIDE AND ALBUTEROL SULFATE 1 AMPULE: .5; 3 SOLUTION RESPIRATORY (INHALATION) at 06:15

## 2020-03-02 RX ADMIN — INSULIN LISPRO 1 UNITS: 100 INJECTION, SOLUTION INTRAVENOUS; SUBCUTANEOUS at 21:25

## 2020-03-02 RX ADMIN — FUROSEMIDE 20 MG: 20 TABLET ORAL at 08:11

## 2020-03-02 ASSESSMENT — PAIN SCALES - GENERAL: PAINLEVEL_OUTOF10: 0

## 2020-03-02 NOTE — PROGRESS NOTES
Subjective:     Follow-up type 2 diabetes  Polyuria no polydipsia no hypoglycemia blood sugars reviewed  ROS  Fever no chills no GI/ complaints no cardiac complaints still complains of occasional shortness of breath bouts of cough wheezing no PND no orthopnea, no TIA no bleeding no headache no sore throat no skin lesions  physical exam  General Appearance: in no acute distress and alert  Skin: warm and dry, no rash or erythema  Head: normocephalic and atraumatic  Eyes: pupils equal, round, and reactive to light, sclera anicteric and pallor    Neck: neck supple and non tender without mass   Pulmonary/Chest: Air entry equal few rhonchi mild expiratory wheezing no crackles use of intercostal muscles  Cardiovascular: normal rate, regular rhythm, normal S1 and S2, no gallops, intact distal pulses and no carotid bruits  Abdomen: soft, non-tender, non-distended, normal bowel sounds, no masses or organomegaly  Extremities: no edema and good pulses no Homans sign  Neurologic: Oriented x3 no focal deficit    BP (!) 121/52   Pulse 69   Temp 97.9 °F (36.6 °C) (Oral)   Resp 20   Ht 4' 9\" (1.448 m)   Wt 173 lb 1.6 oz (78.5 kg)   LMP 04/03/2004 (Within Years)   SpO2 97%   BMI 37.46 kg/m²     CBC:   Lab Results   Component Value Date    WBC 9.4 03/02/2020    RBC 2.91 03/02/2020    HGB 9.4 03/02/2020    HCT 31.6 03/02/2020    .6 03/02/2020    MCH 32.3 03/02/2020    MCHC 29.7 03/02/2020    RDW 15.0 03/02/2020     03/02/2020    MPV 11.2 03/02/2020     CMP:    Lab Results   Component Value Date     03/02/2020    K 4.2 03/02/2020    CL 95 03/02/2020    CO2 30 03/02/2020    BUN 46 03/02/2020    CREATININE 1.65 03/02/2020    GFRAA 37 03/02/2020    LABGLOM 31 03/02/2020    GLUCOSE 229 03/02/2020    GLUCOSE 132 03/07/2012    PROT 7.2 02/28/2020    LABALBU 4.1 02/28/2020    LABALBU Detected 11/30/2018    CALCIUM 8.4 03/02/2020    BILITOT 0.18 02/28/2020    ALKPHOS 63 02/28/2020    AST 10 02/28/2020    ALT 5 disease ([facility]    Fever    Macrocytic anemia    Hypercalcemia    Monoclonal gammopathy of undetermined significance    Acute nonintractable headache    Anemia of chronic renal failure, stage 4 (severe) (HCC)    Traumatic closed displaced fracture of proximal end of right humerus, initial encounter    Urinary tract infection without hematuria    Metabolic encephalopathy    Chest pain    Acute kidney injury ([facility]    Right leg swelling    Hematuria    Chest pain, unspecified    Chronic obstructive pulmonary disease with (acute) exacerbation (HCC)   Chronic respiratory failure with hypoxia  Exacerbation of COPD  RSV tracheobronchitis  Chronic anticoagulation  Paroxysmal atrial fibrillation  Anemia  Type 2 diabetes with hyperglycemia  Type 2 diabetes with renal complications  CKD 3  Obesity BMI of 35-39.9  Plan:    Meds labs reviewed continue with the steroids bronchodilators oxygen avoid nephrotoxic drugs check blood sugars before meals and at bedtime insulin coverage if needed we will have nephrology consultation see lashonda Lu MD  6:37 PM

## 2020-03-02 NOTE — FLOWSHEET NOTE
03/01/20 4363   Provider Notification   Reason for Communication Patient request  (some cough medicine )   Provider Name [address]    Provider Notification Physician   Method of Communication Page   Response Other (Comment)  (pt would like some cough medication ordered)   Notification Time

## 2020-03-02 NOTE — PROGRESS NOTES
Physical Therapy    Facility/Department: [facility]  Initial Assessment    NAME: Shira Ventura  : 1945  MRN: [id number]    Date of Service: 3/2/2020    Discharge Recommendations:  Subacute/Skilled Nursing Facility      History of Present Illness: 70-year-old white gentleman came into the emergency room with cough on and off daily times few days about a week rated 6/10 getting worse, worse laying down better in the upright position yellow phlegm no hemoptysis, chills no fever, shortness of breath all the time worse with exertion better with rest rated 5/10 using oxygen all the time CPAP at night, denies any chest pain palpitation, mild stuffy nose no postnasal drip no sinus pain no headaches     Assessment   Body structures, Functions, Activity limitations: Decreased functional mobility ; Decreased strength;Decreased endurance;Decreased posture;Decreased safe awareness;Decreased ROM; Decreased balance  Assessment: Pt presents from 3 day hospitalization w/ severe fever and inabiltiy to mobilize initially. Pt now functioning below baseline and unable to complete tasks she is normally independent doing. Will progress as tolerated but would expect a short SNF stay would improve long term function and increase time span that patient would be able to live in her home environment. Specific instructions for Next Treatment: standing balance   Prognosis: Good  Decision Making: Medium Complexity  Clinical Presentation: evolving  PT Education: General Safety;Home Exercise Program;Gait Training;Functional Mobility Training;Orientation;Disease Specific Education  REQUIRES PT FOLLOW UP: Yes  Activity Tolerance  Activity Tolerance: Patient limited by endurance       Patient Diagnosis(es): The encounter diagnosis was COPD exacerbation ([address]      has a past medical history of Acute on chronic diastolic congestive heart failure ([address] Anesthesia complication, Asthma, Atrial fibrillation ([address] Cataracts, bilateral, 7/5/2019). Restrictions  Restrictions/Precautions  Restrictions/Precautions: Fall Risk, Up as Tolerated, Contact Precautions, Isolation  Position Activity Restriction  Other position/activity restrictions: pt had heart cath w/ 3 week complications due to bleeding out         Subjective  General  Chart Reviewed: Yes  Patient assessed for rehabilitation services?: Yes  Response To Previous Treatment: Not applicable  Family / Caregiver Present: Yes()  Follows Commands: Within Functional Limits  Pain Screening  Patient Currently in Pain: Denies  Vital Signs  Patient Currently in Pain: Denies       Orientation     Social/Functional History  Social/Functional History  Lives With: Spouse  ADL Assistance: Needs assistance  Homemaking Assistance: Needs assistance  Ambulation Assistance: Needs assistance  Transfer Assistance: Needs assistance  Occupation: On disability    Objective     Observation/Palpation  Observation: 4\"9; Rt UE h/o shoulder fx;  Rt LE hip limited   Edema: Rt LE double the size as Lt LE    AROM RLE (degrees)  RLE AROM: Exceptions  RLE General AROM: 25-50% decrease in hip rotation / hip  AROM LLE (degrees)  LLE AROM : WFL  AROM RUE (degrees)  RUE AROM : Exceptions  RUE General AROM: shoulder limitations  Strength RLE  Comment: 3-/5 hips; 3-/5 knee; 3/5 ankle   Strength LLE  Comment: 4/5  Motor Control  Gross Motor?: Exceptions(slow; especially on Rt U/LE)     Bed mobility  Bridging: Minimal assistance  Rolling to Left: Minimal assistance  Rolling to Right: Minimal assistance  Supine to Sit: Minimal assistance  Sit to Supine: Minimal assistance  Transfers  Sit to Stand: Minimal Assistance  Stand to sit: Minimal Assistance  Bed to Chair: Minimal assistance  Ambulation  Ambulation?: Yes  Ambulation 1  Surface: level tile  Device: Rolling Walker  Assistance: Minimal assistance  Quality of Gait: gait distance limited by decreased aerobic capacity; pt had to urgently sit once she fatigued.      Gait Deviations: Decreased step length;Shuffles  Distance: 12 ft x 1;  25 ft x 1   Comments: Toilet transfer min assist +1 for balance during hygiene. Pt unable to reach to wipe and reports using assistive device for wiping. Balance  Posture: Fair  Sitting - Static: Good  Sitting - Dynamic: Good  Standing - Static: Fair  Standing - Dynamic: Fair;-  Comments: balance w/ r.walker assist.   Exercises  Comments: sit to stand 5 rep test -> pt unable to complete more than 3 reps and took 47 seconds to complete. Deep breathing and circulation ex's. Standing heel raises x 10 reps. Plan   Plan  Times per week: 1-2x/day. 5-6x/week   Specific instructions for Next Treatment: standing balance   Current Treatment Recommendations: Transfer Training, Strengthening, Endurance Training, Cognitive Reorientation, Patient/Caregiver Education & Training, Positioning, Balance Training, ROM, Gait Training, Home Exercise Program, Functional Mobility Training, Cognitive/Perceptual Training, Stair training, Manual Therapy - Joint Manipulation, Safety Education & Training  Safety Devices  Type of devices: All fall risk precautions in place, Call light within reach, Left in chair    AM-PAC Score  16/24      Goals  Short term goals  Time Frame for Short term goals: 12 tx's  Short term goal 1: Bed mob independent  Short term goal 2: Transfers independent  Short term goal 3: Amb x 100 ft w/ r.walker independently   Short term goal 4: Pt issued standing CKC exercises and performed them independently   Patient Goals   Patient goals : Pt goal is for a quick rehab and to return home asap. Therapy Time   Individual Concurrent Group Co-treatment   Time In 1506         Time Out 1553         Minutes 47           total time on [facility] care - 58 minutes. Treatment time 40 minutes.      Russ Alvarado, PT

## 2020-03-02 NOTE — PROGRESS NOTES
Pulmonary Critical Care Progress Note    Patient seen for the follow up of COPD exacerbation  Subjective:    She denies chest pain. Mild occasional cough, mostly dry. Shortness of breath is not much changed. She is sitting in a chair. She has used her CPAP from home. She c is tolerating oral intake. Murphy Loots Examination:    Vitals: BP (!) 121/52   Pulse 69   Temp 97.9 °F (36.6 °C) (Oral)   Resp 20   Ht 4' 9\" (1.448 m)   Wt 173 lb 1.6 oz (78.5 kg)   LMP 2004 (Within Years)   SpO2 97%   BMI 37.46 kg/m²   SpO2  Av.6 %  Min: 93 %  Max: 97 %  General appearance: alert and cooperative with exam  Neck: No JVD  Lungs: Decreased breath sound no crackles bilateral wheezing  Heart: regular rate and rhythm, S1, S2 normal, no gallop  Abdomen: Soft, non tender, + BS  Extremities: no cyanosis or clubbing. No significant edema    LABs:    CBC:   Recent Labs     20  0546 20  0459   WBC 9.0 9.4   HGB 9.3* 9.4*   HCT 31.5* 31.6*   * 110*     BMP:   Recent Labs     20  0546 20  0459    140   K 4.5 4.2   CO2 32* 30   BUN 44* 46*   CREATININE 1.84* 1.65*   LABGLOM 27* 31*   GLUCOSE 184* 229*   LIVER PROFILE:  No results for input(s): AST, ALT, LABALBU in the last 72 hours. Results for Jerome Shepherd (MRN [id number]) as of 3/1/2020 12:54   Ref. Range 3/1/2020 05:46   Pro-BNP Latest Ref Range: <300 pg/mL 1,263 (H)   Results for Jerome Shepherd (MRN [id number]) as of 3/1/2020 12:54   Ref.  Range 2020 17:13   Human Metapneumovirus PCR Latest Ref Range: Not Detected  Not Detected   Influenza A H1 PCR Latest Ref Range: Not Detected  NOT REPORTED   Adenovirus PCR Latest Ref Range: Not Detected  Not Detected   B Pertussis by PCR Latest Ref Range: Not Detected  Not Detected   Chlamydia pneumoniae By PCR Latest Ref Range: Not Detected  Not Detected   Coronavirus 229E PCR Latest Ref Range: Not Detected  Not Detected   Coronavirus HKU1 PCR Latest Ref Range: Not Detected  Not Detected   Coronavirus NL63 PCR Latest Ref Range: Not Detected  Not Detected   Coronavirus OC Latest Ref Range: Not Detected  Not Detected   Influenza A by PCR Latest Ref Range: Not Detected  Not Detected   Influenza A H1 (2009) PCR Latest Ref Range: Not Detected  NOT REPORTED   Influenza A H3 PCR Latest Ref Range: Not Detected  NOT REPORTED   Influenza B by PCR Latest Ref Range: Not Detected  Not Detected   Parainfluenza 1 PCR Latest Ref Range: Not Detected  Not Detected   Parainfluenza 2 PCR Latest Ref Range: Not Detected  Not Detected   Parainfluenza 3 PCR Latest Ref Range: Not Detected  Not Detected   Parainfluenza 4 PCR Latest Ref Range: Not Detected  Not Detected   Resp Syncytial Virus PCR Latest Ref Range: Not Detected  DETECTED (A)   Rhino/Enterovirus PCR Latest Ref Range: Not Detected  Not Detected   Mycoplasma pneumo by PCR Latest Ref Range: Not Detected  Not Detected       Radiology:    Chest x-ray 3/1  Cardiomegaly with vascular congestion is unchanged from the prior study.  No   focal consolidation, pneumothorax, large pleural effusion or free air.  Once   again demonstrates chronic deformity right humeral head         Impression:  · Chronic hypoxic respiratory failure  · Acute exacerbation of COPD  · RSV tracheobronchitis  · Pulmonary edema/chronic diastolic heart failure  · Obstructive sleep apnea/Obesity, noncompliant on CPAP  · KRISTIN on CKD  · A. fib, DM, HLD, HTN,  GERD      Recommendations:  · Oxygen by nasal cannula  · Home CPAP for sleep  · Incentive spirometry every hour while awake   ·  DuoNeb by nebulizer every 4 hours while awake  · Stop Symbicort 160  · Pulmicort 0.5 Q 12hrs   · Solu-Medrol 40 IV every 8 hours  · Zithromax Rocephin   · Mucinex   · On Lasix;  Lasix 40 IV x1  · Consider nephrology consult  · Acetylcholine antibody  · DVT prophylaxis      Elizabeth Rivas MD, [facility]  Pulmonary Critical Care and Sleep Medicine,  [facility]  Cell: [phone]  Office: [phone]

## 2020-03-02 NOTE — PLAN OF CARE
Problem: Risk for Impaired Skin Integrity  Goal: Tissue integrity - skin and mucous membranes  Description  Structural intactness and normal physiological function of skin and  mucous membranes. 3/2/2020 1151 by Tavo Adams RN  Outcome: Ongoing  Note:   Skin assessed each shift and as needed. Mucus membranes moist and intact. Patient turned and repositioned as needed. Patient in recliner most of day, importance of repositioning reiterated with patient. Heels elevated as needed. Dressings changed as needed and per orders. Continue to monitor skin for breakdown.

## 2020-03-03 ENCOUNTER — APPOINTMENT (OUTPATIENT)
Dept: ULTRASOUND IMAGING | Age: 75
DRG: 191 | End: 2020-03-03
Payer: MEDICARE

## 2020-03-03 LAB
ABSOLUTE EOS #: 0 K/UL (ref 0–0.4)
ABSOLUTE IMMATURE GRANULOCYTE: 0.28 K/UL (ref 0–0.3)
ABSOLUTE LYMPH #: 0.55 K/UL (ref 1–4.8)
ABSOLUTE MONO #: 0.46 K/UL (ref 0.2–0.8)
ANION GAP SERPL CALCULATED.3IONS-SCNC: 13 MMOL/L (ref 9–17)
BASOPHILS # BLD: 0 %
BASOPHILS ABSOLUTE: 0 K/UL (ref 0–0.2)
BUN BLDV-MCNC: 52 MG/DL (ref 8–23)
BUN/CREAT BLD: 29 (ref 9–20)
CALCIUM SERPL-MCNC: 8.5 MG/DL (ref 8.6–10.4)
CHLORIDE BLD-SCNC: 93 MMOL/L (ref 98–107)
CO2: 31 MMOL/L (ref 20–31)
CREAT SERPL-MCNC: 1.81 MG/DL (ref 0.5–0.9)
DIFFERENTIAL TYPE: ABNORMAL
EOSINOPHILS RELATIVE PERCENT: 0 % (ref 1–4)
GFR AFRICAN AMERICAN: 33 ML/MIN
GFR NON-AFRICAN AMERICAN: 27 ML/MIN
GFR SERPL CREATININE-BSD FRML MDRD: ABNORMAL ML/MIN/{1.73_M2}
GFR SERPL CREATININE-BSD FRML MDRD: ABNORMAL ML/MIN/{1.73_M2}
GLUCOSE BLD-MCNC: 167 MG/DL (ref 65–105)
GLUCOSE BLD-MCNC: 175 MG/DL (ref 65–105)
GLUCOSE BLD-MCNC: 209 MG/DL (ref 70–99)
GLUCOSE BLD-MCNC: 236 MG/DL (ref 65–105)
GLUCOSE BLD-MCNC: 254 MG/DL (ref 65–105)
HCT VFR BLD CALC: 31.7 % (ref 36.3–47.1)
HEMOGLOBIN: 9.4 G/DL (ref 11.9–15.1)
IMMATURE GRANULOCYTES: 3 %
LYMPHOCYTES # BLD: 6 % (ref 24–44)
MCH RBC QN AUTO: 32.3 PG (ref 25.2–33.5)
MCHC RBC AUTO-ENTMCNC: 29.7 G/DL (ref 28.4–34.8)
MCV RBC AUTO: 108.9 FL (ref 82.6–102.9)
MONOCYTES # BLD: 5 % (ref 1–7)
MORPHOLOGY: ABNORMAL
NRBC AUTOMATED: 0 PER 100 WBC
PDW BLD-RTO: 15 % (ref 11.8–14.4)
PLATELET # BLD: 115 K/UL (ref 138–453)
PLATELET ESTIMATE: ABNORMAL
PMV BLD AUTO: 11 FL (ref 8.1–13.5)
POTASSIUM SERPL-SCNC: 4.4 MMOL/L (ref 3.7–5.3)
RBC # BLD: 2.91 M/UL (ref 3.95–5.11)
RBC # BLD: ABNORMAL 10*6/UL
SEG NEUTROPHILS: 86 % (ref 36–66)
SEGMENTED NEUTROPHILS ABSOLUTE COUNT: 7.91 K/UL (ref 1.8–7.7)
SODIUM BLD-SCNC: 137 MMOL/L (ref 135–144)
WBC # BLD: 9.2 K/UL (ref 3.5–11.3)
WBC # BLD: ABNORMAL 10*3/UL

## 2020-03-03 PROCEDURE — 82570 ASSAY OF URINE CREATININE: CPT

## 2020-03-03 PROCEDURE — 6360000002 HC RX W HCPCS: Performed by: NURSE PRACTITIONER

## 2020-03-03 PROCEDURE — 81001 URINALYSIS AUTO W/SCOPE: CPT

## 2020-03-03 PROCEDURE — 6370000000 HC RX 637 (ALT 250 FOR IP): Performed by: NURSE PRACTITIONER

## 2020-03-03 PROCEDURE — 97116 GAIT TRAINING THERAPY: CPT

## 2020-03-03 PROCEDURE — 6370000000 HC RX 637 (ALT 250 FOR IP): Performed by: INTERNAL MEDICINE

## 2020-03-03 PROCEDURE — 36415 COLL VENOUS BLD VENIPUNCTURE: CPT

## 2020-03-03 PROCEDURE — 76770 US EXAM ABDO BACK WALL COMP: CPT

## 2020-03-03 PROCEDURE — 97530 THERAPEUTIC ACTIVITIES: CPT

## 2020-03-03 PROCEDURE — 97110 THERAPEUTIC EXERCISES: CPT

## 2020-03-03 PROCEDURE — 2700000000 HC OXYGEN THERAPY PER DAY

## 2020-03-03 PROCEDURE — 6360000002 HC RX W HCPCS: Performed by: INTERNAL MEDICINE

## 2020-03-03 PROCEDURE — 94640 AIRWAY INHALATION TREATMENT: CPT

## 2020-03-03 PROCEDURE — 97535 SELF CARE MNGMENT TRAINING: CPT

## 2020-03-03 PROCEDURE — 94761 N-INVAS EAR/PLS OXIMETRY MLT: CPT

## 2020-03-03 PROCEDURE — 6370000000 HC RX 637 (ALT 250 FOR IP): Performed by: EMERGENCY MEDICINE

## 2020-03-03 PROCEDURE — 1200000000 HC SEMI PRIVATE

## 2020-03-03 PROCEDURE — 82043 UR ALBUMIN QUANTITATIVE: CPT

## 2020-03-03 PROCEDURE — 84300 ASSAY OF URINE SODIUM: CPT

## 2020-03-03 PROCEDURE — 80048 BASIC METABOLIC PNL TOTAL CA: CPT

## 2020-03-03 PROCEDURE — 2580000003 HC RX 258: Performed by: EMERGENCY MEDICINE

## 2020-03-03 PROCEDURE — 82947 ASSAY GLUCOSE BLOOD QUANT: CPT

## 2020-03-03 PROCEDURE — 2580000003 HC RX 258: Performed by: NURSE PRACTITIONER

## 2020-03-03 PROCEDURE — 85025 COMPLETE CBC W/AUTO DIFF WBC: CPT

## 2020-03-03 PROCEDURE — 97166 OT EVAL MOD COMPLEX 45 MIN: CPT

## 2020-03-03 RX ORDER — ACETYLCYSTEINE 200 MG/ML
600 SOLUTION ORAL; RESPIRATORY (INHALATION) 2 TIMES DAILY
Status: DISCONTINUED | OUTPATIENT
Start: 2020-03-03 | End: 2020-03-04 | Stop reason: HOSPADM

## 2020-03-03 RX ADMIN — APIXABAN 5 MG: 5 TABLET, FILM COATED ORAL at 08:20

## 2020-03-03 RX ADMIN — ACETYLCYSTEINE 600 MG: 200 SOLUTION ORAL; RESPIRATORY (INHALATION) at 20:51

## 2020-03-03 RX ADMIN — ACETAMINOPHEN 650 MG: 325 TABLET ORAL at 21:04

## 2020-03-03 RX ADMIN — IPRATROPIUM BROMIDE AND ALBUTEROL SULFATE 1 AMPULE: .5; 3 SOLUTION RESPIRATORY (INHALATION) at 10:55

## 2020-03-03 RX ADMIN — CARBIDOPA AND LEVODOPA 1 TABLET: 25; 100 TABLET, EXTENDED RELEASE ORAL at 17:08

## 2020-03-03 RX ADMIN — IPRATROPIUM BROMIDE AND ALBUTEROL SULFATE 1 AMPULE: .5; 3 SOLUTION RESPIRATORY (INHALATION) at 20:51

## 2020-03-03 RX ADMIN — ROPINIROLE HYDROCHLORIDE 2 MG: 1 TABLET, FILM COATED ORAL at 21:02

## 2020-03-03 RX ADMIN — METHYLPREDNISOLONE SODIUM SUCCINATE 40 MG: 40 INJECTION, POWDER, FOR SOLUTION INTRAMUSCULAR; INTRAVENOUS at 06:30

## 2020-03-03 RX ADMIN — INSULIN LISPRO 1 UNITS: 100 INJECTION, SOLUTION INTRAVENOUS; SUBCUTANEOUS at 17:08

## 2020-03-03 RX ADMIN — METHYLPREDNISOLONE SODIUM SUCCINATE 40 MG: 40 INJECTION, POWDER, FOR SOLUTION INTRAMUSCULAR; INTRAVENOUS at 13:55

## 2020-03-03 RX ADMIN — FERROUS SULFATE TAB EC 325 MG (65 MG FE EQUIVALENT) 325 MG: 325 (65 FE) TABLET DELAYED RESPONSE at 08:21

## 2020-03-03 RX ADMIN — IPRATROPIUM BROMIDE AND ALBUTEROL SULFATE 1 AMPULE: .5; 3 SOLUTION RESPIRATORY (INHALATION) at 15:09

## 2020-03-03 RX ADMIN — CARBIDOPA AND LEVODOPA 1 TABLET: 25; 100 TABLET, EXTENDED RELEASE ORAL at 08:20

## 2020-03-03 RX ADMIN — ALOGLIPTIN 12.5 MG: 12.5 TABLET, FILM COATED ORAL at 08:19

## 2020-03-03 RX ADMIN — METHYLPREDNISOLONE SODIUM SUCCINATE 40 MG: 40 INJECTION, POWDER, FOR SOLUTION INTRAMUSCULAR; INTRAVENOUS at 21:05

## 2020-03-03 RX ADMIN — RASAGILINE 1 MG: 0.5 TABLET ORAL at 08:19

## 2020-03-03 RX ADMIN — CARBIDOPA AND LEVODOPA 1 TABLET: 25; 100 TABLET, EXTENDED RELEASE ORAL at 21:05

## 2020-03-03 RX ADMIN — AMIODARONE HYDROCHLORIDE 200 MG: 200 TABLET ORAL at 08:20

## 2020-03-03 RX ADMIN — FUROSEMIDE 20 MG: 20 TABLET ORAL at 08:20

## 2020-03-03 RX ADMIN — GUAIFENESIN 600 MG: 600 TABLET, EXTENDED RELEASE ORAL at 21:03

## 2020-03-03 RX ADMIN — APIXABAN 5 MG: 5 TABLET, FILM COATED ORAL at 21:04

## 2020-03-03 RX ADMIN — BUDESONIDE 500 MCG: 0.5 SUSPENSION RESPIRATORY (INHALATION) at 07:06

## 2020-03-03 RX ADMIN — INSULIN LISPRO 2 UNITS: 100 INJECTION, SOLUTION INTRAVENOUS; SUBCUTANEOUS at 21:01

## 2020-03-03 RX ADMIN — SENNOSIDES 17.2 MG: 8.6 TABLET, FILM COATED ORAL at 21:05

## 2020-03-03 RX ADMIN — CALCIUM ACETATE 667 MG: 667 CAPSULE ORAL at 12:11

## 2020-03-03 RX ADMIN — BUDESONIDE 500 MCG: 0.5 SUSPENSION RESPIRATORY (INHALATION) at 20:51

## 2020-03-03 RX ADMIN — AZITHROMYCIN MONOHYDRATE 500 MG: 500 INJECTION, POWDER, LYOPHILIZED, FOR SOLUTION INTRAVENOUS at 08:32

## 2020-03-03 RX ADMIN — FERROUS SULFATE TAB EC 325 MG (65 MG FE EQUIVALENT) 325 MG: 325 (65 FE) TABLET DELAYED RESPONSE at 17:08

## 2020-03-03 RX ADMIN — Medication 10 ML: at 08:32

## 2020-03-03 RX ADMIN — CALCIUM ACETATE 667 MG: 667 CAPSULE ORAL at 08:20

## 2020-03-03 RX ADMIN — ATORVASTATIN CALCIUM 20 MG: 20 TABLET, FILM COATED ORAL at 21:04

## 2020-03-03 RX ADMIN — CALCITRIOL 0.75 MCG: 0.25 CAPSULE ORAL at 08:19

## 2020-03-03 RX ADMIN — CEFTRIAXONE SODIUM 1 G: 1 INJECTION, POWDER, FOR SOLUTION INTRAMUSCULAR; INTRAVENOUS at 13:55

## 2020-03-03 RX ADMIN — CALCIUM ACETATE 667 MG: 667 CAPSULE ORAL at 17:08

## 2020-03-03 RX ADMIN — ISOSORBIDE MONONITRATE 30 MG: 30 TABLET ORAL at 08:20

## 2020-03-03 RX ADMIN — IPRATROPIUM BROMIDE AND ALBUTEROL SULFATE 1 AMPULE: .5; 3 SOLUTION RESPIRATORY (INHALATION) at 07:06

## 2020-03-03 RX ADMIN — TRAZODONE HYDROCHLORIDE 50 MG: 50 TABLET ORAL at 21:03

## 2020-03-03 RX ADMIN — ROPINIROLE HYDROCHLORIDE 2 MG: 1 TABLET, FILM COATED ORAL at 08:19

## 2020-03-03 RX ADMIN — METOPROLOL TARTRATE 25 MG: 25 TABLET ORAL at 08:20

## 2020-03-03 RX ADMIN — GUAIFENESIN 600 MG: 600 TABLET, EXTENDED RELEASE ORAL at 08:19

## 2020-03-03 RX ADMIN — ROPINIROLE HYDROCHLORIDE 2 MG: 1 TABLET, FILM COATED ORAL at 13:55

## 2020-03-03 RX ADMIN — ASPIRIN 81 MG: 81 TABLET, COATED ORAL at 08:20

## 2020-03-03 RX ADMIN — INSULIN LISPRO 1 UNITS: 100 INJECTION, SOLUTION INTRAVENOUS; SUBCUTANEOUS at 08:19

## 2020-03-03 RX ADMIN — INSULIN LISPRO 2 UNITS: 100 INJECTION, SOLUTION INTRAVENOUS; SUBCUTANEOUS at 12:11

## 2020-03-03 RX ADMIN — CARBIDOPA AND LEVODOPA 1 TABLET: 25; 100 TABLET, EXTENDED RELEASE ORAL at 12:11

## 2020-03-03 RX ADMIN — PANTOPRAZOLE SODIUM 40 MG: 40 TABLET, DELAYED RELEASE ORAL at 06:30

## 2020-03-03 ASSESSMENT — PAIN DESCRIPTION - PAIN TYPE: TYPE: ACUTE PAIN

## 2020-03-03 ASSESSMENT — PAIN DESCRIPTION - DESCRIPTORS: DESCRIPTORS: ACHING

## 2020-03-03 ASSESSMENT — PAIN SCALES - GENERAL
PAINLEVEL_OUTOF10: 8
PAINLEVEL_OUTOF10: 7

## 2020-03-03 ASSESSMENT — PAIN DESCRIPTION - LOCATION: LOCATION: HEAD

## 2020-03-03 NOTE — PROGRESS NOTES
humerus, initial encounter    Urinary tract infection without hematuria    Metabolic encephalopathy    Chest pain    Acute kidney [facility] ([facility]    Right leg swelling    Hematuria    Chest pain, unspecified    Chronic obstructive pulmonary disease with (acute) exacerbation (HCC)   copd exac    ch resp failure   dm2 with renal complication   ckd3  dm2 with hyperglycemia   pafib  Plan:    meds labs reviewed same rx avoid  Nephrotoxic drugs  Insulin coverage before meals , continue steroids bronchodilators , pt ot .  See orders      Berny Westbrook MD  6:12 PM

## 2020-03-03 NOTE — PLAN OF CARE
Problem: Pain:  Goal: Pain level will decrease  Description  Pain level will decrease  Outcome: Ongoing  Goal: Control of acute pain  Description  Control of acute pain  Outcome: Ongoing  Goal: Control of chronic pain  Description  Control of chronic pain  Outcome: Ongoing     Problem: Breathing Pattern - Ineffective:  Goal: Ability to achieve and maintain a regular respiratory rate will improve  Description  Ability to achieve and maintain a regular respiratory rate will improve  Outcome: Ongoing     Problem: Falls - Risk of:  Goal: Will remain free from falls  Description  Will remain free from falls  Outcome: Ongoing  Goal: Absence of physical injury  Description  Absence of physical injury  Outcome: Ongoing     Problem: Risk for Impaired Skin Integrity  Goal: Tissue integrity - skin and mucous membranes  Description  Structural intactness and normal physiological function of skin and  mucous membranes.   Outcome: Ongoing     Problem: IP MOBILITY  Goal: LTG - patient will ambulate community distance  Outcome: Ongoing  Goal: LTG - patient will demonstrate safe mobility requirements  Outcome: Ongoing

## 2020-03-03 NOTE — PROGRESS NOTES
Pt urine is blood tinged. Pt  and pt both stated that the PCP is aware, and state it is d/t eliquis.

## 2020-03-03 NOTE — CONSULTS
Reason for Consult:  Acute kidney injury. Requesting Physician:  Christiano Malik MD    HISTORY OF PRESENT ILLNESS:    The patient is a 69 y.o. female who presents with cough with phlegm and shortness of breath. On previous labs her serum creatinines have been between 1.4 to 1.7 with eGFR of 20s ml/min. On admission she was noted to have elevated creatinine of 1.5 that has risen to 1.8. Denies any problems with nausea, vomiting, appetite, diarrhea or difficulty with urination. Denies any recent use of NSAIDs or iv contrast. Quit NSAIDs use couple years ago. Review Of Systems:   Constitutional: No fever, chills, lethargy, weakness or wt loss. HEENT:  No headache, nasal discharge or sore throat. Cardiac:  No chest pain, dyspnea, orthopnea or PND. Chest:              No cough, phlegm or wheezing. Abdomen:  No abdominal pain, nausea, vomiting or diarrhea. Neuro:   No gross focal weakness, numbness, abnormal movements or seizure like activity. Skin:   No rashes or itching. :   No hematuria, pyuria, dysuria or flank pain. Extremities:  No swelling or joint pains. Endocrine: No polyuria, polydypsia, or thyroid problems. Hematology:    No bleeding disorders, bruising or anemia. All other ROS is negative.       Past Medical History:   Diagnosis Date    Acute on chronic diastolic congestive heart failure ([address]     Anesthesia complication     DIFFICULTY WAKING OP    Asthma     Atrial fibrillation ([address] [facility] 2013    Cataracts, bilateral     Cellulitis     BILAT LOWER LEGS-PT STATES IS IMPROVING    Cerebral artery occlusion with cerebral infarction [facility])     Last stroke was February 2017 w/no deficits-TOTAL OF 3    CHF (congestive heart failure) (HCC)     Chronic kidney disease 2013    NOT ON DIALYSIS YET    Chronic kidney disease     Closed fracture of proximal end of right humerus with routine healing     Constipation     CHRONIC    Controlled type 2 diabetes mellitus with stage 3 chronic kidney MD at [address]  05/31/2013    Dr. Soham Pagan DX W/CELL WASHG [facility] N/A 6/5/2018    BRONCHOSCOPY performed by Erickson Lund MD at [facility] FLX W/REMOVAL LESION BY HOT BX FORCEPS N/A 4/25/2017    COLONOSCOPY POLYPECTOMY HOT BIOPSY performed by Chelsy Thakur MD at [facility] N/A 4/4/2018    CYSTOSCOPY performed by Saqib Mathias MD at [address] Right 5/28/2019    SHOULDER CLOSED REDUCTION PERCUTANEOUS PINNING RIGHT performed by Kanika Schulz MD at [place] [address]  12/28/2016    gastritis, esophagitis,     UPPER GASTROINTESTINAL ENDOSCOPY N/A 8/29/2018    EGD BIOPSY performed by Chelsy Thakur MD at [address] [place] [address]       Prior to Admission medications    Medication Sig Start Date End Date Taking?  Authorizing Provider   Calcium-Vitamin D-Vitamin K 650-12.5-40 MG-MCG-MCG CHEW Take 1 tablet by mouth daily as needed (low calcium intake)   Yes Historical Provider, MD   metoprolol tartrate (LOPRESSOR) 25 MG tablet Take 25 mg by mouth daily   Yes Historical Provider, MD   Cyanocobalamin (B-12) 5000 MCG CAPS Take 5,000 mcg by mouth daily   Yes Historical Provider, MD   Cholecalciferol (VITAMIN D3) 50 MCG (2000 UT) CAPS Take 4,000 Units by mouth daily   Yes Historical Provider, MD   tiotropium (SPIRIVA) 18 MCG inhalation capsule Inhale 18 mcg into the lungs daily   Yes Historical Provider, MD   isosorbide mononitrate (IMDUR) 30 MG extended release tablet Take 1 tablet by mouth daily 1/3/20  Yes Phi Paula MD   fluticasone-vilanterol (BREO ELLIPTA) 100-25 MCG/INH AEPB inhaler Inhale 2 puffs into the lungs daily   Yes Historical Provider, MD   traZODone (DESYREL) 50 MG tablet Take 25-50 mg by mouth nightly    Yes Historical Provider, MD   melatonin 5 MG TABS tablet Take 5 mg by mouth nightly as needed (sleep)   Yes ONETOUCH VERIO strip 1 each by In Vitro route daily As needed.  3/4/19   Lesly Hill DO   ONE TOUCH ULTRA TEST strip USE 1 STRIP THREE TIMES A DAY 12/26/18   Lesly Hill DO   amiodarone (CORDARONE) 200 MG tablet Take 200 mg by mouth daily     Historical Provider, MD   aspirin 81 MG tablet Take 81 mg by mouth daily     Historical Provider, MD       Scheduled Meds:   budesonide  0.5 mg Nebulization BID    apixaban  5 mg Oral BID    guaiFENesin  600 mg Oral BID    methylPREDNISolone  40 mg Intravenous Q8H    metoprolol tartrate  25 mg Oral Daily    calcitRIOL  0.75 mcg Oral Daily    sodium chloride flush  10 mL Intravenous 2 times per day    azithromycin  500 mg Intravenous Daily    ipratropium-albuterol  1 ampule Inhalation Q4H WA    cefTRIAXone (ROCEPHIN) IV  1 g Intravenous Q24H    amiodarone  200 mg Oral Daily    aspirin  81 mg Oral Daily    atorvastatin  20 mg Oral Daily    calcium acetate  667 mg Oral TID WC    carbidopa-levodopa  1 tablet Oral 4x Daily    ferrous sulfate  325 mg Oral BID WC    furosemide  20 mg Oral Daily    isosorbide mononitrate  30 mg Oral Daily    alogliptin  12.5 mg Oral Daily    pantoprazole  40 mg Oral QAM AC    senna  2 tablet Oral Nightly    rOPINIRole  2 mg Oral TID    rasagiline  1 mg Oral Daily    traZODone  50 mg Oral Nightly    insulin lispro  0-6 Units Subcutaneous TID WC    insulin lispro  0-3 Units Subcutaneous Nightly     Continuous Infusions:   dextrose       PRN Meds:sodium chloride flush, acetaminophen, albuterol, glucose, dextrose, glucagon (rDNA), dextrose    Allergies   Allergen Reactions    Dye [Barium-Containing Compounds] Other (See Comments)     Cause Afib per     Pcn [Penicillins] Itching and Swelling    Bactrim [Sulfamethoxazole-Trimethoprim] Other (See Comments)     Allergic Nephritis       Social History     Socioeconomic History    Marital status:      Spouse name: Not on file    Number of children: Not on file

## 2020-03-04 ENCOUNTER — APPOINTMENT (OUTPATIENT)
Dept: GENERAL RADIOLOGY | Age: 75
DRG: 191 | End: 2020-03-04
Payer: MEDICARE

## 2020-03-04 ENCOUNTER — HOSPITAL ENCOUNTER (OUTPATIENT)
Dept: INFUSION THERAPY | Facility: MEDICAL CENTER | Age: 75
End: 2020-03-04
Payer: MEDICARE

## 2020-03-04 VITALS
HEIGHT: 57 IN | WEIGHT: 173.1 LBS | RESPIRATION RATE: 16 BRPM | HEART RATE: 90 BPM | SYSTOLIC BLOOD PRESSURE: 169 MMHG | OXYGEN SATURATION: 92 % | TEMPERATURE: 98.2 F | DIASTOLIC BLOOD PRESSURE: 70 MMHG | BODY MASS INDEX: 37.35 KG/M2

## 2020-03-04 LAB
-: ABNORMAL
ACETYLCHOLINE BINDING ANTIBODY: 0 NMOL/L (ref 0–0.4)
AMORPHOUS: ABNORMAL
BACTERIA: ABNORMAL
BILIRUBIN URINE: NEGATIVE
CASTS UA: ABNORMAL /LPF
COLOR: ABNORMAL
COMMENT UA: ABNORMAL
CREATININE URINE: 79.1 MG/DL (ref 28–217)
CRYSTALS, UA: ABNORMAL /HPF
EPITHELIAL CELLS UA: ABNORMAL /HPF (ref 0–5)
GLUCOSE BLD-MCNC: 211 MG/DL (ref 65–105)
GLUCOSE BLD-MCNC: 225 MG/DL (ref 65–105)
GLUCOSE BLD-MCNC: 257 MG/DL (ref 65–105)
GLUCOSE BLD-MCNC: 315 MG/DL (ref 65–105)
GLUCOSE URINE: ABNORMAL
KETONES, URINE: NEGATIVE
LEUKOCYTE ESTERASE, URINE: NEGATIVE
MICROALBUMIN/CREAT 24H UR: 209 MG/L
MICROALBUMIN/CREAT UR-RTO: 264 MCG/MG CREAT
MUCUS: ABNORMAL
NITRITE, URINE: NEGATIVE
OTHER OBSERVATIONS UA: ABNORMAL
PH UA: 5.5 (ref 5–8)
PROTEIN UA: ABNORMAL
RBC UA: ABNORMAL /HPF (ref 0–2)
RENAL EPITHELIAL, UA: ABNORMAL /HPF
SODIUM,UR: 46 MMOL/L
SPECIFIC GRAVITY UA: 1.02 (ref 1–1.03)
TRICHOMONAS: ABNORMAL
TURBIDITY: ABNORMAL
URINE HGB: ABNORMAL
UROBILINOGEN, URINE: NORMAL
WBC UA: ABNORMAL /HPF (ref 0–5)
YEAST: ABNORMAL

## 2020-03-04 PROCEDURE — 94640 AIRWAY INHALATION TREATMENT: CPT

## 2020-03-04 PROCEDURE — 6360000002 HC RX W HCPCS: Performed by: INTERNAL MEDICINE

## 2020-03-04 PROCEDURE — 97535 SELF CARE MNGMENT TRAINING: CPT

## 2020-03-04 PROCEDURE — 6370000000 HC RX 637 (ALT 250 FOR IP): Performed by: NURSE PRACTITIONER

## 2020-03-04 PROCEDURE — 6370000000 HC RX 637 (ALT 250 FOR IP): Performed by: EMERGENCY MEDICINE

## 2020-03-04 PROCEDURE — 2700000000 HC OXYGEN THERAPY PER DAY

## 2020-03-04 PROCEDURE — 97530 THERAPEUTIC ACTIVITIES: CPT

## 2020-03-04 PROCEDURE — 2580000003 HC RX 258: Performed by: NURSE PRACTITIONER

## 2020-03-04 PROCEDURE — 99221 1ST HOSP IP/OBS SF/LOW 40: CPT | Performed by: INTERNAL MEDICINE

## 2020-03-04 PROCEDURE — 94669 MECHANICAL CHEST WALL OSCILL: CPT

## 2020-03-04 PROCEDURE — 97116 GAIT TRAINING THERAPY: CPT

## 2020-03-04 PROCEDURE — 6370000000 HC RX 637 (ALT 250 FOR IP): Performed by: INTERNAL MEDICINE

## 2020-03-04 PROCEDURE — 6360000002 HC RX W HCPCS: Performed by: NURSE PRACTITIONER

## 2020-03-04 PROCEDURE — 2580000003 HC RX 258: Performed by: EMERGENCY MEDICINE

## 2020-03-04 PROCEDURE — 71045 X-RAY EXAM CHEST 1 VIEW: CPT

## 2020-03-04 PROCEDURE — 82947 ASSAY GLUCOSE BLOOD QUANT: CPT

## 2020-03-04 PROCEDURE — 94761 N-INVAS EAR/PLS OXIMETRY MLT: CPT

## 2020-03-04 PROCEDURE — 97110 THERAPEUTIC EXERCISES: CPT

## 2020-03-04 RX ORDER — ACETYLCYSTEINE 200 MG/ML
600 SOLUTION ORAL; RESPIRATORY (INHALATION) 2 TIMES DAILY
Qty: 18 ML | Refills: 0 | Status: SHIPPED | OUTPATIENT
Start: 2020-03-04 | End: 2020-01-01 | Stop reason: ALTCHOICE

## 2020-03-04 RX ORDER — GUAIFENESIN 600 MG/1
600 TABLET, EXTENDED RELEASE ORAL 2 TIMES DAILY PRN
Qty: 28 TABLET | Refills: 0 | Status: SHIPPED | OUTPATIENT
Start: 2020-03-04 | End: 2020-03-18

## 2020-03-04 RX ORDER — PREDNISONE 20 MG/1
20 TABLET ORAL 2 TIMES DAILY
Status: DISCONTINUED | OUTPATIENT
Start: 2020-03-04 | End: 2020-03-04 | Stop reason: HOSPADM

## 2020-03-04 RX ORDER — PREDNISONE 20 MG/1
TABLET ORAL
Qty: 20 TABLET | Refills: 0 | Status: SHIPPED | OUTPATIENT
Start: 2020-03-04 | End: 2020-03-20

## 2020-03-04 RX ORDER — NICOTINE POLACRILEX 4 MG
15 LOZENGE BUCCAL PRN
Qty: 45 G | Refills: 1 | DISCHARGE
Start: 2020-03-04 | End: 2020-01-01

## 2020-03-04 RX ADMIN — CALCIUM ACETATE 667 MG: 667 CAPSULE ORAL at 17:26

## 2020-03-04 RX ADMIN — ROPINIROLE HYDROCHLORIDE 2 MG: 1 TABLET, FILM COATED ORAL at 08:49

## 2020-03-04 RX ADMIN — AZITHROMYCIN MONOHYDRATE 500 MG: 500 INJECTION, POWDER, LYOPHILIZED, FOR SOLUTION INTRAVENOUS at 08:50

## 2020-03-04 RX ADMIN — ISOSORBIDE MONONITRATE 30 MG: 30 TABLET ORAL at 08:50

## 2020-03-04 RX ADMIN — ROPINIROLE HYDROCHLORIDE 2 MG: 1 TABLET, FILM COATED ORAL at 13:27

## 2020-03-04 RX ADMIN — ASPIRIN 81 MG: 81 TABLET, COATED ORAL at 08:49

## 2020-03-04 RX ADMIN — Medication 10 ML: at 00:56

## 2020-03-04 RX ADMIN — RASAGILINE 1 MG: 0.5 TABLET ORAL at 08:49

## 2020-03-04 RX ADMIN — METOPROLOL TARTRATE 25 MG: 25 TABLET ORAL at 08:50

## 2020-03-04 RX ADMIN — CARBIDOPA AND LEVODOPA 1 TABLET: 25; 100 TABLET, EXTENDED RELEASE ORAL at 17:26

## 2020-03-04 RX ADMIN — ACETAMINOPHEN 650 MG: 325 TABLET ORAL at 11:26

## 2020-03-04 RX ADMIN — FERROUS SULFATE TAB EC 325 MG (65 MG FE EQUIVALENT) 325 MG: 325 (65 FE) TABLET DELAYED RESPONSE at 17:26

## 2020-03-04 RX ADMIN — INSULIN LISPRO 2 UNITS: 100 INJECTION, SOLUTION INTRAVENOUS; SUBCUTANEOUS at 17:26

## 2020-03-04 RX ADMIN — CALCITRIOL 0.75 MCG: 0.25 CAPSULE ORAL at 08:49

## 2020-03-04 RX ADMIN — GUAIFENESIN 600 MG: 600 TABLET, EXTENDED RELEASE ORAL at 08:49

## 2020-03-04 RX ADMIN — FUROSEMIDE 20 MG: 20 TABLET ORAL at 08:50

## 2020-03-04 RX ADMIN — CARBIDOPA AND LEVODOPA 1 TABLET: 25; 100 TABLET, EXTENDED RELEASE ORAL at 12:01

## 2020-03-04 RX ADMIN — APIXABAN 5 MG: 5 TABLET, FILM COATED ORAL at 08:50

## 2020-03-04 RX ADMIN — METHYLPREDNISOLONE SODIUM SUCCINATE 40 MG: 40 INJECTION, POWDER, FOR SOLUTION INTRAMUSCULAR; INTRAVENOUS at 05:35

## 2020-03-04 RX ADMIN — IPRATROPIUM BROMIDE AND ALBUTEROL SULFATE 1 AMPULE: .5; 3 SOLUTION RESPIRATORY (INHALATION) at 11:44

## 2020-03-04 RX ADMIN — CEFTRIAXONE SODIUM 1 G: 1 INJECTION, POWDER, FOR SOLUTION INTRAMUSCULAR; INTRAVENOUS at 13:27

## 2020-03-04 RX ADMIN — ALOGLIPTIN 12.5 MG: 12.5 TABLET, FILM COATED ORAL at 10:47

## 2020-03-04 RX ADMIN — BUDESONIDE 500 MCG: 0.5 SUSPENSION RESPIRATORY (INHALATION) at 07:28

## 2020-03-04 RX ADMIN — Medication 10 ML: at 08:51

## 2020-03-04 RX ADMIN — FERROUS SULFATE TAB EC 325 MG (65 MG FE EQUIVALENT) 325 MG: 325 (65 FE) TABLET DELAYED RESPONSE at 08:50

## 2020-03-04 RX ADMIN — INSULIN LISPRO 2 UNITS: 100 INJECTION, SOLUTION INTRAVENOUS; SUBCUTANEOUS at 08:51

## 2020-03-04 RX ADMIN — CALCIUM ACETATE 667 MG: 667 CAPSULE ORAL at 12:01

## 2020-03-04 RX ADMIN — IPRATROPIUM BROMIDE AND ALBUTEROL SULFATE 1 AMPULE: .5; 3 SOLUTION RESPIRATORY (INHALATION) at 15:35

## 2020-03-04 RX ADMIN — INSULIN LISPRO 3 UNITS: 100 INJECTION, SOLUTION INTRAVENOUS; SUBCUTANEOUS at 12:00

## 2020-03-04 RX ADMIN — CARBIDOPA AND LEVODOPA 1 TABLET: 25; 100 TABLET, EXTENDED RELEASE ORAL at 08:50

## 2020-03-04 RX ADMIN — IPRATROPIUM BROMIDE AND ALBUTEROL SULFATE 1 AMPULE: .5; 3 SOLUTION RESPIRATORY (INHALATION) at 07:27

## 2020-03-04 RX ADMIN — AMIODARONE HYDROCHLORIDE 200 MG: 200 TABLET ORAL at 08:50

## 2020-03-04 RX ADMIN — ACETYLCYSTEINE 600 MG: 200 SOLUTION ORAL; RESPIRATORY (INHALATION) at 07:28

## 2020-03-04 RX ADMIN — CALCIUM ACETATE 667 MG: 667 CAPSULE ORAL at 08:49

## 2020-03-04 RX ADMIN — METHYLPREDNISOLONE SODIUM SUCCINATE 40 MG: 40 INJECTION, POWDER, FOR SOLUTION INTRAMUSCULAR; INTRAVENOUS at 13:27

## 2020-03-04 RX ADMIN — PANTOPRAZOLE SODIUM 40 MG: 40 TABLET, DELAYED RELEASE ORAL at 05:34

## 2020-03-04 RX ADMIN — DARBEPOETIN ALFA 200 MCG: 200 INJECTION, SOLUTION INTRAVENOUS; SUBCUTANEOUS at 08:49

## 2020-03-04 ASSESSMENT — PAIN SCALES - GENERAL: PAINLEVEL_OUTOF10: 9

## 2020-03-04 NOTE — CONSULTS
Today's Date: 3/4/2020  Patient Name: Jessica Hurtado  Date of admission: 2/28/2020  7:18 AM  Patient's age: 69 y.o., 1945  Admission Dx: COPD exacerbation ([address] [J44.1]  COPD exacerbation ([address] [J44.1]  Chronic obstructive pulmonary disease with (acute) exacerbation ([address] [J44.1]    Reason for Consult: management recommendations  Requesting Physician: Jony Weller MD    CHIEF COMPLAINT:  Anemia. RSV bronchiolitis     History Obtained From:  patient    HISTORY OF PRESENT ILLNESS:      The patient is a 69 y.o.  female who is admitted to the hospital for respiratory failure. She has acute on chronic respiratory difficulty exacerbated by RSV bronchiolitis. The patient was admitted and managed and her condition seems to be improving. The patient is known to us with history of chronic anemia, work-up suggested that her anemia is due to combination of iron/B12 deficiency as well as anemia of chronic disease. The patient has been managed by iron and B12 supplements and then started on growth factor with Aranesp. She is due for Aranesp today. Patient is seen and evaluated. Respiratory symptoms seem to be improving. She is planning to discharge if stable.     Past Medical History:   has a past medical history of Acute on chronic diastolic congestive heart failure ([address] Anesthesia complication, Asthma, Atrial fibrillation ([address] Cataracts, bilateral, Cellulitis, Cerebral artery occlusion with cerebral infarction ([address] CHF (congestive heart failure) ([address] Chronic kidney disease, Chronic kidney disease, Closed fracture of proximal end of right humerus with routine healing, Constipation, Controlled type 2 diabetes mellitus with stage 3 chronic kidney disease, without long-term current use of insulin ([address] COPD (chronic obstructive pulmonary disease) ([address] Difficult intravenous access, Difficulty walking, Diverticulosis, DM2 (diabetes mellitus, type 2) ([address] Full dentures, GERD (gastroesophageal reflux disease), H/O fall, Headache(784.0), [place] (hard of hearing), Hyperlipidemia, Hyperplastic polyp of intestine, Hypertension, Impaired ambulation, Kidney stone, Living in nursing home, Morbid obesity with BMI of 40.0-44.9, adult ([address] Muscle weakness, Nephrolithiasis, Open wound, ECTOR on CPAP, Osteoarthritis, Parkinson disease ([facility] Restless leg syndrome, Spinal stenosis in cervical region, Type II or unspecified type diabetes mellitus without mention of complication, not stated as uncontrolled, Urethral caruncle, Wears glasses, and Wears glasses. Past Surgical History:   has a past surgical history that includes Cervical disc surgery (2012); Appendectomy; Tonsillectomy and adenoidectomy (1953); hernia repair (1999); eye surgery (Bilateral, 2017); Cervical spine surgery (5/31/13); laminectomy (05/31/2013); Upper gastrointestinal endoscopy (12/28/2016); Colonoscopy (2009); Colonoscopy (12/29/2016); Colonoscopy (12/30/2016); Colonoscopy (04/25/2017); pr colsc flx w/removal lesion by hot bx forceps (N/A, 4/25/2017); Cystorrhaphy (04/04/2018); pr cystourethroscopy,biopsies (N/A, 4/4/2018); pr [facility] incl fluor gdnce dx w/cell washg spx (N/A, 6/5/2018); Upper gastrointestinal endoscopy (N/A, 8/29/2018); shoulder fracture surgery (Right, 5/28/2019); Hardware Removal (Right, 07/05/2019); and Hardware Removal (Right, 7/5/2019). Medications:    Reviewed in Epic     Allergies:  Dye [barium-containing compounds]; Pcn [penicillins]; and Bactrim [sulfamethoxazole-trimethoprim]    Social History:   reports that she quit smoking about 49 years ago. Her smoking use included cigarettes. She has a 30.00 pack-year smoking history. She has never used smokeless tobacco. She reports previous alcohol use. She reports that she does not use drugs. Family History: family history includes Heart Disease in her mother; Heart Failure in her mother; High Blood Pressure in her mother; Hypertension in her mother.     REVIEW OF SYSTEMS:    Constitutional: No fever or chills. No night sweats, no weight loss , + fatigue  Eyes: No eye discharge, double vision, or eye pain   HEENT: negative for sore mouth, sore throat, hoarseness and voice change   Respiratory: Chronic cough and shortness of breath as discussed  Cardiovascular: negative for chest pain, dyspnea, palpitations, orthopnea, PND   Gastrointestinal: negative for nausea, vomiting, diarrhea, constipation, abdominal pain, Dysphagia, hematemesis and hematochezia   Genitourinary: negative for frequency, dysuria, nocturia, urinary incontinence, and hematuria   Integument: negative for rash, skin lesions, bruises.    Hematologic/Lymphatic: negative for easy bruising, bleeding, lymphadenopathy, or petechiae   Endocrine: negative for heat or cold intolerance,weight changes, change in bowel habits and hair loss   Musculoskeletal: negative for myalgias, arthralgias, pain, joint swelling,and bone pain   Neurological: negative for headaches, dizziness, seizures, weakness, numbness    PHYSICAL EXAM:      BP (!) 169/70   Pulse 90   Temp 98.2 °F (36.8 °C) (Oral)   Resp 16   Ht 4' 9\" (1.448 m)   Wt 173 lb 1.6 oz (78.5 kg)   LMP 2004 (Within Years)   SpO2 92%   BMI 37.46 kg/m²    Temp (24hrs), Av.4 °F (36.9 °C), Min:98.2 °F (36.8 °C), Max:98.6 °F (37 °C)    General appearance - well appearing, no in pain or distress   Mental status - alert and cooperative   Eyes - pupils equal and reactive, extraocular eye movements intact   Ears - bilateral TM's and external ear canals normal   Mouth - mucous membranes moist, pharynx normal without lesions   Neck - supple, no significant adenopathy   Lymphatics - no palpable lymphadenopathy, no hepatosplenomegaly   Chest -poor air entry, no wheezing appreciated  Heart - normal rate, regular rhythm, normal S1, S2, no murmurs  Abdomen - soft, nontender, nondistended, no masses or organomegaly   Neurological - alert, oriented, normal speech, no focal

## 2020-03-04 NOTE — DISCHARGE SUMMARY
for 4 days, THEN 1 tablet daily for 4 days, THEN 0.5 tablets daily for 4 days. Qty: 20 tablet, Refills: 0      acetylcysteine (MUCOMYST) 20 % nebulizer solution Inhale 3 mLs into the lungs 2 times daily for 3 days  Qty: 18 mL, Refills: 0      guaiFENesin (MUCINEX) 600 MG extended release tablet Take 1 tablet by mouth 2 times daily as needed for Congestion  Qty: 28 tablet, Refills: 0      glucose (GLUTOSE) 40 % GEL Take 37.5 mLs by mouth as needed (hypoglycemia)  Qty: 45 g, Refills: 1      !! insulin lispro (HUMALOG) 100 UNIT/ML injection vial Inject 0-3 Units into the skin nightly  Qty: 1 vial, Refills: 3      !! insulin lispro (HUMALOG) 100 UNIT/ML injection vial Inject 0-6 Units into the skin 3 times daily (with meals)  Qty: 1 vial, Refills: 3      glucagon, rDNA, 1 MG injection Inject 1 mg into the muscle as needed for Low blood sugar (Blood glucose less than 70 mg/dL and patient NOT ALERT or NPO and does not have IV access.)       ! ! - Potential duplicate medications found. Please discuss with provider.       CONTINUE these medications which have CHANGED    Details   apixaban (ELIQUIS) 5 MG TABS tablet Take 1 tablet by mouth 2 times daily  Qty: 60 tablet      metoprolol tartrate (LOPRESSOR) 25 MG tablet Take 1 tablet by mouth daily  Qty: 60 tablet, Refills: 3         CONTINUE these medications which have NOT CHANGED    Details   Calcium-Vitamin D-Vitamin K 650-12.5-40 MG-MCG-MCG CHEW Take 1 tablet by mouth daily as needed (low calcium intake)      Cyanocobalamin (B-12) 5000 MCG CAPS Take 5,000 mcg by mouth daily      tiotropium (SPIRIVA) 18 MCG inhalation capsule Inhale 18 mcg into the lungs daily      isosorbide mononitrate (IMDUR) 30 MG extended release tablet Take 1 tablet by mouth daily  Qty: 30 tablet, Refills: 0      fluticasone-vilanterol (BREO ELLIPTA) 100-25 MCG/INH AEPB inhaler Inhale 2 puffs into the lungs daily      traZODone (DESYREL) 50 MG tablet Take 25-50 mg by mouth nightly       melatonin 5 MG TABS tablet Take 5 mg by mouth nightly as needed (sleep)      calcitRIOL (ROCALTROL) 0.25 MCG capsule Take 0.75 mcg by mouth daily       furosemide (LASIX) 20 MG tablet Take 1 tablet by mouth daily  Qty: 60 tablet, Refills: 3      rOPINIRole (REQUIP) 2 MG tablet Take 1 tablet by mouth 3 times daily  Qty: 270 tablet, Refills: 3    Associated Diagnoses: Parkinson's disease ([facility])      pantoprazole (PROTONIX) 40 MG tablet TAKE 1 TABLET TWICE A DAY BEFORE MEALS  Qty: 180 tablet, Refills: 3    Associated Diagnoses: Gastroesophageal reflux disease without esophagitis      linagliptin (TRADJENTA) 5 MG tablet Take 0.5 tablets by mouth daily  Qty: 90 tablet, Refills: 1    Associated Diagnoses: Controlled type 2 diabetes mellitus with stage 3 chronic kidney disease, without long-term current use of insulin ([facility])      magnesium oxide (MAG-OX) 400 (241.3 Mg) MG TABS tablet Take 1 tablet by mouth 2 times daily  Qty: 30 tablet      senna (SENOKOT) 8.6 MG tablet Take 1-2 tablets by mouth nightly       ferrous sulfate 325 (65 Fe) MG EC tablet Take 1 tablet by mouth 2 times daily (with meals)  Qty: 90 tablet, Refills: 3      rasagiline mesylate 1 MG TABS Take 1 tablet by mouth daily      Oxygen Concentrator Dx: Pneumonia, use as directed. Qty: 1 each, Refills: 0      albuterol (PROVENTIL) (2.5 MG/3ML) 0.083% nebulizer solution Take 3 mLs by nebulization 4 times daily  Qty: 120 each, Refills: 3      miconazole (MICOTIN) 2 % powder Apply topically 2 times daily. Qty: 45 g, Refills: 1      atorvastatin (LIPITOR) 20 MG tablet TAKE 1 TABLET DAILY  Qty: 90 tablet, Refills: 2      carbidopa-levodopa (SINEMET CR)  MG per extended release tablet Take 1 tablet by mouth 4 times daily  Qty: 360 tablet, Refills: 3    Associated Diagnoses: Parkinson's disease ([address]      !! ONETOUCH VERIO strip 1 each by In Vitro route daily As needed.   Qty: 100 each, Refills: 3      !! ONE TOUCH ULTRA TEST strip USE 1 STRIP THREE TIMES A DAY  Qty: 300

## 2020-03-04 NOTE — PLAN OF CARE
Problem: Pain:  Goal: Pain level will decrease  Description  Pain level will decrease  3/4/2020 0502 by Ashly Gentile RN  Outcome: Ongoing  Goal: Control of acute pain  Description  Control of acute pain  3/4/2020 1506 by Nava Upton RN  Outcome: Ongoing  Note:   Pain level assessment complete. Patient educated on pain scale and control interventions  PRN pain medication given per patient request  Patient instructed to call out with new onset of pain or unrelieved pain    3/4/2020 0502 by Ashly Gentile RN  Outcome: Ongoing  Goal: Control of chronic pain  Description  Control of chronic pain  3/4/2020 0502 by Ashly Gentile RN  Outcome: Ongoing     Problem: Breathing Pattern - Ineffective:  Goal: Ability to achieve and maintain a regular respiratory rate will improve  Description  Ability to achieve and maintain a regular respiratory rate will improve  3/4/2020 0502 by Ashly Gentile RN  Outcome: Ongoing     Problem: Falls - Risk of:  Goal: Will remain free from falls  Description  Will remain free from falls  3/4/2020 1506 by Nava Upton RN  Outcome: Ongoing  Note:   Siderails up x 2  Hourly rounding  Call light in reach  Instructed to call for assist before attempting out of bed. Remains free from falls and accidental injury at this time   Floor free from obstacles  Bed is locked and in lowest position  Adequate lighting provided  Bed alarm on, [place] star and Stay with Me signs posted      3/4/2020 0502 by Ashly Gentile RN  Outcome: Ongoing  Goal: Absence of physical injury  Description  Absence of physical injury  3/4/2020 0502 by Ashly Gentile RN  Outcome: Ongoing     Problem: Risk for Impaired Skin Integrity  Goal: Tissue integrity - skin and mucous membranes  Description  Structural intactness and normal physiological function of skin and  mucous membranes.   3/4/2020 1506 by Nava Upton RN  Outcome: Ongoing  Note:   Checked for incontinence

## 2020-03-04 NOTE — PROGRESS NOTES
Signs  Patient Currently in Pain: No  Oxygen Therapy  SpO2: 92 %  Pulse Oximeter Device Mode: Continuous  Pulse Oximeter Device Location: Finger  O2 Device: Nasal cannula  O2 Flow Rate (L/min): 3 L/min  Patient Observation  Observations: Patient up in side chair with O2 and IV and telemetry       Orientation     Cognition      Objective      Transfers  Sit to Stand: Minimal Assistance  Stand to sit: Minimal Assistance  Bed to Chair: Minimal assistance  Comment: Patient requires 2 tries to successfully complete sit to stand. Patient given education to promote momentum and energy conservation techniques. Ambulation  Ambulation?: Yes  Ambulation 1  Surface: level tile  Device: Rolling Walker  Assistance: Minimal assistance  Quality of Gait: gait distance limited by decreased aerobic capacity; pt had to urgently sit once she fatigued. Gait Deviations: Decreased step length;Shuffles  Distance: 40 feet x1  Comments: Patient requires several standing rest breaks due to decreased endurance and aerobic capacity. Stairs/Curb  Stairs?: No     Balance  Posture: Fair  Sitting - Static: Good  Sitting - Dynamic: Good  Standing - Static: Fair  Standing - Dynamic: Fair;-  Comments: Balance with JOSE ALBERTO UE support on RW  Exercises  Comments: Patient performed JOSE ALBERTO LE exercises seated to promote gait activities.    AROM RLE (degrees)  RLE AROM: Exceptions  RLE General AROM: 25-50% decrease in hip rotation / hip  AROM LLE (degrees)  LLE AROM : WFL  AROM RUE (degrees)  RUE AROM : Exceptions  RUE General AROM: shoulder limitations  Strength RLE  Comment: 3-/5 hips; 3-/5 knee; 3/5 ankle   Strength LLE  Comment: 4/5    AM-PAC Score  AM-PAC Inpatient Mobility Raw Score : 16 (03/04/20 1014)  AM-PAC Inpatient T-Scale Score : 40.78 (03/04/20 1014)  Mobility Inpatient CMS 0-100% Score: 54.16 (03/04/20 1014)  Mobility Inpatient CMS G-Code Modifier : CK (03/04/20 1014)          Goals  Short term goals  Time Frame for Short term goals: 12 tx's  Short term goal 1: Bed mob independent  Short term goal 2: Transfers independent  Short term goal 3: Amb x 100 ft w/ r.walker independently   Short term goal 4: Pt issued standing CKC exercises and performed them independently   Patient Goals   Patient goals : Pt goal is for a quick rehab and to return home asap. Plan    Plan  Times per week: 1-2x/day. 5-6x/week   Specific instructions for Next Treatment: standing balance   Current Treatment Recommendations: Transfer Training, Strengthening, Endurance Training, Cognitive Reorientation, Patient/Caregiver Education & Training, Positioning, Balance Training, ROM, Gait Training, Home Exercise Program, Functional Mobility Training, Cognitive/Perceptual Training, Stair training, Manual Therapy - Joint Manipulation, Safety Education & Training  Safety Devices  Type of devices:  All fall risk precautions in place, Call light within reach, Left in chair     Therapy Time   Individual Concurrent Group Co-treatment   Time In  928         Time Out  1006         Minutes  [address] Student Physical Therapy Assistant  Supervised by Ion Fraga Physical Therapy Assistant

## 2020-03-04 NOTE — PROGRESS NOTES
Active Problem List   Diagnosis    Controlled type 2 diabetes mellitus with stage 3 chronic kidney disease, without long-term current use of insulin (HCC)    Hyperlipidemia    Essential hypertension    Chronic leg pain    Paroxysmal atrial fibrillation (HCC)    Asthma    Gastroesophageal reflux disease without esophagitis    ECTOR on CPAP    Type 2 diabetes mellitus with complication, without long-term current use of insulin (HCC)    Spinal stenosis in cervical region    Hypomagnesemia    Hyperphosphaturia    Hypocalcemia    Parkinson's disease ([address]    Acute renal failure ([address]    Acute cystitis with hematuria    Altered mental status    Iron deficiency anemia due to chronic blood loss    Morbid obesity with BMI of 40.0-44.9, adult ([facility])    Hyperplastic polyp of intestine    Diverticulosis    Panlobular emphysema (HCC)    Rapid atrial fibrillation ([facility])    CKD (chronic kidney disease) stage 3, GFR 30-59 ml/min ([facility])    KRISTIN (acute kidney injury) ([address]    Anemia in chronic kidney disease    Chronic respiratory failure with hypoxia and hypercapnia ([facility])    Acute kidney injury superimposed on chronic kidney disease ([address]    CKD stage 4 due to type 2 diabetes mellitus ([facility])    E. coli UTI (urinary tract infection)    Nephrolithiasis    Candidal intertrigo    Venous insufficiency    Malabsorption    Anemia of chronic renal failure, stage 4 (severe) ([facility])    Iron deficiency    Coronary atherosclerosis    COPD exacerbation ([facility])    Restless leg syndrome    SIRS (systemic inflammatory response syndrome) ([facility])    Nausea and vomiting in adult    Bacterial UTI    Odynophagia    Esophageal dysphagia    Candida esophagitis ([facility])    Sepsis due to urinary tract infection (HCC)    Chronic respiratory failure with hypoxia (HCC)    Chronic diastolic congestive heart failure (HCC)    History of ESBL E. coli infection    Stage 4 chronic kidney disease (HCC)    Fever    Macrocytic anemia  Hypercalcemia    Monoclonal gammopathy of undetermined significance    Acute nonintractable headache    Anemia of chronic renal failure, stage 4 (severe) (HCC)    Traumatic closed displaced fracture of proximal end of right humerus, initial encounter    Urinary tract infection without hematuria    Metabolic encephalopathy    Chest pain    Acute kidney injury ([facility]    Right leg swelling    Hematuria    Chest pain, unspecified    Chronic obstructive pulmonary disease with (acute) exacerbation (HCC)     cOPD exacerbation  Chronic respiratory failure with hypoxia  RSV tracheobronchitis  Type 2 diabetes with renal complications  Type 2 diabetes with hyperglycemia  CKD 3-  Paroxysmal atrial fibrillation  Parkinson's  obesity BMI 28-06.8  Chronic diastolic CHF  Paroxysmal atrial fibrillation  Chronic anticoagulation  Anemia of chronic disease  Plan:    Meds labs reviewed Lasix reintroduced patient doing fairly stable, patient to be discharged to skilled nursing facility for physical physical therapy occupational therapycxr reviewed asymptomatic lasix restarted      Ree Hutchinson MD  5:30 PM

## 2020-03-04 NOTE — PROGRESS NOTES
position/activity restrictions: pt had heart cath w/ 3 week complications due to bleeding out, 3 L O2 per NC, LUE IV, alarms   Subjective   General  Chart Reviewed: Yes  Patient assessed for rehabilitation services?: Yes  Response to previous treatment: Patient with no complaints from previous session  Family / Caregiver Present: Yes(spouse)  Pre Treatment Pain Screening  Intervention List: Nurse called to administer meds  Comments / Details: Pt stated \"I have a bad headache! \"  Vital Signs  Patient Currently in Pain: Yes(headache)   Orientation  Orientation  Overall Orientation Status: Within Functional Limits  Objective    ADL  Grooming: Contact guard assistance(standing at sink to wash hands)  Toileting: Maximum assistance        Balance  Sitting Balance: Stand by assistance  Standing Balance: Minimal assistance  Standing Balance  Time: up to 1 min  Activity: static standing with walker  Comment: Pt extremely sleepy, falling asleep during session  Functional Mobility  Functional - Mobility Device: Rolling Walker  Activity: To/from bathroom  Assist Level: Minimal assistance(CGA-MIN A for safety/balance)  Functional Mobility Comments: Verbal cues for safety/technique  Toilet Transfers  Toilet - Technique: Ambulating  Equipment Used: Grab bars  Toilet Transfer: Contact guard assistance;Minimal assistance  Toilet Transfers Comments: Mod verbal instruction/tactile assist for hand placement on grab bar, controlled stand to sit and awareness/assist with all lines to increase safety. Transfers  Stand Step Transfers: Contact guard assistance;Minimal assistance  Stand Pivot Transfers: Contact guard assistance;Minimal assistance  Sit to stand: Contact guard assistance;Minimal assistance  Stand to sit: Contact guard assistance;Minimal assistance  Transfer Comments:  Mod verbal instruction/tactile assist for hand placement, noes over toes as able, upright posture, RW safety, pursed lip breathing, controlled stand to sits Inpatient CMS G-Code Modifier : CK (03/04/20 1223)    Goals  Short term goals  Time Frame for Short term goals: by discharge, pt to demo   Short term goal 1: bed mob tasks with use of rail as needed to SBA. Short term goal 2: increase in BUE strength by a 1/2 grade except R shld (increase AROM by 10-15 degrees as able) to assist with self care tasks. Short term goal 3: UB ADL to set up/min assist and LB ADL to min assist with use of AD/AE as needed. Short term goal 4: toileting tasks with use of grab bar/AD as needed to min assist.    Short term goal 5: ADL transfers and functional mob with AD as needed to SBA level. Long term goals  Long term goal 1: Pt to stand with SUP with AD as needed wally > 4 mins as able to reduce fall risk with ADL tasks. Long term goal 2: Pt/family to be I with BUE HEP, EC/WS and fall prevention tech as well as AE/DME recommendations with use of hand outs as needed. Patient Goals   Patient goals : walk on my own!        Therapy Time   Individual Concurrent Group Co-treatment   Time In 1049         Time Out 1114         Minutes [address] [place] [address], [facility]

## 2020-03-04 NOTE — CARE COORDINATION
Social work: Patient to [employer] to [address] [place] [address] via [employer] at [facility].  # for RN report: [phone]. [phone] Priscilla Mckeon faxed to [phone]. Informed RN, pt, unit and SNF of dc time, agreeable to plan. HENS completed.
Social work: Spoke with Peggy [facility] [employer] and [facility] they are full at this time, anticipate beds later in the week. SW met with pt/spouse for additional choices, they are as follows:  1. [facility]  2. [address] [place] [address]  3. [facility]  4. [facility]  5. [facility]. Referral sent to all 5 for review.
NILDA Yo on 2/28/20 at 10:14 AM

## 2020-03-04 NOTE — PROGRESS NOTES
Pulmonary Critical Care Progress Note  Steve Nuñez CNP / Dr. Judy Daley M.D. Patient seen for the follow up of chronic respiratory failure, COPD exacerbation, RSV    Subjective:  She is ambulating about in her room with nursing present. Her shortness of breath has improved significantly, close to her baseline. She has an occasional cough with minimal sputum production. She denies any chest pain. She is feeling ready to discharge to rehab facility later this evening.     Length of stay: 5 Days    Examination:  Vitals: BP (!) 169/70   Pulse 90   Temp 98.2 °F (36.8 °C) (Oral)   Resp 16   Ht 4' 9\" (1.448 m)   Wt 173 lb 1.6 oz (78.5 kg)   LMP 04/03/2004 (Within Years)   SpO2 92%   BMI 37.46 kg/m²     General appearance: alert and cooperative with exam, ambulating in her room with a walker, no distress  Neck: No JVD  Lungs: Occasional rhonchi, slight wheeze  Heart: regular rate and rhythm, S1, S2 normal, no gallop  Abdomen: Soft, non tender, + BS  Extremities: no cyanosis or clubbing. + Lower extremity edema    LABs:  CBC:   Recent Labs     03/02/20  0459 03/03/20  0506   WBC 9.4 9.2   HGB 9.4* 9.4*   HCT 31.6* 31.7*   * 115*     BMP:   Recent Labs     03/02/20  0459 03/03/20  0506    137   K 4.2 4.4   CO2 30 31   BUN 46* 52*   CREATININE 1.65* 1.81*   LABGLOM 31* 27*   GLUCOSE 229* 209*     Radiology:      Impression:  · Chronic hypoxic respiratory failure  · Acute exacerbation of COPD  · RSV tracheobronchitis  · Pulmonary edema/chronic diastolic heart failure  · Obstructive sleep apnea/Obesity, noncompliant on CPAP  · KRISTIN on CKD  · A. fib, DM, HLD, HTN,  GERD    Recommendations:  · 2 liters/min via nasal cannula  · CPAP for sleep  · DC Zithromax/Rocephin  · DuoNeb by nebulizer every 4 hours while awake  · Budesonide aerosols every 12 hours  · Mucomyst by nebulizer 20% 2 mL 2 x daily  · DC Solu-Medrol   · Prednisone taper   · Mucinex, change to PRN  · Diuresis with Lasix  · Nephrology

## 2020-03-04 NOTE — PROGRESS NOTES
1 tablet by mouth daily 5/30/19  Yes Riley Caban MD   rOPINIRole (REQUIP) 2 MG tablet Take 1 tablet by mouth 3 times daily 4/9/19  Yes Lesly Hill,    pantoprazole (PROTONIX) 40 MG tablet TAKE 1 TABLET TWICE A DAY BEFORE MEALS 2/7/19  Yes Lesly Hill DO   linagliptin (TRADJENTA) 5 MG tablet Take 0.5 tablets by mouth daily 1/16/19  Yes Lesly Hill,    magnesium oxide (MAG-OX) 400 (241.3 Mg) MG TABS tablet Take 1 tablet by mouth 2 times daily 9/7/18  Yes Sofía Marcos, DO   senna (SENOKOT) 8.6 MG tablet Take 1-2 tablets by mouth nightly    Yes Historical Provider, MD   ferrous sulfate 325 (65 Fe) MG EC tablet Take 1 tablet by mouth 2 times daily (with meals) 12/21/17  Yes Shirley Po P Blood, DO   rasagiline mesylate 1 MG TABS Take 1 tablet by mouth daily   Yes Historical Provider, MD   Oxygen Concentrator Dx: Pneumonia, use as directed. Patient taking differently: Dx: Pneumonia, use as directed. ON 24/7 2/10/17  Yes Lesly Hill, DO   apixaban (ELIQUIS) 2.5 MG TABS tablet Take 1 tablet by mouth 2 times daily 12/10/19   Alis Mena MD   darbepoetin albaro-polysorbate (ARANESP) 200 MCG/0.4ML SOSY injection Inject 200 mcg into the skin every 28 days    Historical Provider, MD   albuterol (PROVENTIL) (2.5 MG/3ML) 0.083% nebulizer solution Take 3 mLs by nebulization 4 times daily 9/11/19   Alis Mena MD   miconazole (MICOTIN) 2 % powder Apply topically 2 times daily. 9/11/19   Alis Mena MD   cyanocobalamin 1000 MCG/ML injection Inject 1,000 mcg into the muscle every 30 days    Historical Provider, MD   atorvastatin (LIPITOR) 20 MG tablet TAKE 1 TABLET DAILY 4/18/19   Lesly Hill DO   carbidopa-levodopa (SINEMET CR)  MG per extended release tablet Take 1 tablet by mouth 4 times daily 4/9/19   Lesly L Hill, DO   ONETOUCH VERIO strip 1 each by In Vitro route daily As needed.  3/4/19   Lesly Hill DO   ONE TOUCH ULTRA TEST strip USE 1 STRIP THREE TIMES A DAY 12/26/18   Lesly CASSIDY auscultation, no wheezing, rhonchi or rales. Cardiovascular: RRR, S1S2, no murmur, rub or gallop. No lower extremity edema. Abdomen: Soft, non tender to palpation. Musculoskeletal: No cyanosis or clubbing. Integumentary: Pink, warm and dry. Free from rash or lesions. Skin turgor normal.  CNS: Oriented to person, place and time. Speech clear. Face symmetrical. No tremor.      Data:  CBC:   Lab Results   Component Value Date    WBC 9.2 03/03/2020    HGB 9.4 (L) 03/03/2020    HCT 31.7 (L) 03/03/2020    .9 (H) 03/03/2020     (L) 03/03/2020     BMP:    Lab Results   Component Value Date     03/03/2020     03/02/2020     03/01/2020    K 4.4 03/03/2020    K 4.2 03/02/2020    K 4.5 03/01/2020    CL 93 (L) 03/03/2020    CL 95 (L) 03/02/2020    CL 95 (L) 03/01/2020    CO2 31 03/03/2020    CO2 30 03/02/2020    CO2 32 (H) 03/01/2020    BUN 52 (H) 03/03/2020    BUN 46 (H) 03/02/2020    BUN 44 (H) 03/01/2020    CREATININE 1.81 (H) 03/03/2020    CREATININE 1.65 (H) 03/02/2020    CREATININE 1.84 (H) 03/01/2020    GLUCOSE 209 (H) 03/03/2020    GLUCOSE 229 (H) 03/02/2020    GLUCOSE 184 (H) 03/01/2020     CMP:   Lab Results   Component Value Date     03/03/2020    K 4.4 03/03/2020    CL 93 03/03/2020    CO2 31 03/03/2020    BUN 52 03/03/2020    CREATININE 1.81 03/03/2020    GLUCOSE 209 03/03/2020    GLUCOSE 132 03/07/2012    CALCIUM 8.5 03/03/2020    PROT 7.2 02/28/2020    LABALBU 4.1 02/28/2020    LABALBU Detected 11/30/2018    BILITOT 0.18 02/28/2020    ALKPHOS 63 02/28/2020    AST 10 02/28/2020    ALT 5 02/28/2020      Hepatic:   Lab Results   Component Value Date    AST 10 02/28/2020    AST 10 12/31/2019    AST 8 12/06/2019    ALT 5 02/28/2020    ALT 6 12/31/2019    ALT <5 (L) 12/06/2019    BILITOT 0.18 (L) 02/28/2020    BILITOT 0.13 (L) 12/31/2019    BILITOT 0.12 (L) 12/06/2019    ALKPHOS 63 02/28/2020    ALKPHOS 57 12/31/2019    ALKPHOS 46 12/06/2019     BNP:   Lab Results   Component Value Date    BNP 40 06/07/2013     (H) 05/31/2013     Lipids:   Lab Results   Component Value Date    CHOL 135 08/26/2017    HDL 30 (L) 08/26/2017     INR:   Lab Results   Component Value Date    INR 1.0 12/30/2019    INR 0.9 09/01/2019    INR 1.0 08/30/2019     PTH: No results found for: PTH  Phosphorus:    Lab Results   Component Value Date    PHOS 5.0 09/11/2019     Ionized Calcium: No results found for: IONCA  Magnesium:   Lab Results   Component Value Date    MG 1.4 02/28/2020     Albumin:   Lab Results   Component Value Date    LABALBU 4.1 02/28/2020    LABALBU Detected 11/30/2018     Last 3 CK, CKMB, Troponin: @LABRCNT(CKTOTAL:3,CKMB:3,TROPONINI:3)       URINE:)No results found for: Erving Clubs     Radiology:   1. Bilateral renal calculi and/or renal vascular calcifications.  No   hydronephrosis. 2. Gallstones and gallbladder sludge. 3. Small postvoid residual.   4. Exam limited due to patient's          Assessment:  1. CKD stage 4 with baseline GFR of 20s ml/min. Creatinine is at baseline. 2. Hypertension. 3. Hypokalemia, improved. 4. Anemia. 5. Hyperphosphatemia. Plan:  Renal function is at baseline  Has significant proteinuria  Renal ultrasound showed bilateral renal calculi without hydronephrosis   Renal diet. Resume Lasix  Avoid hypotension, nephrotoxic drugs, Lovenox, Fleets enema and IV contrast exposure. For discharge from nephrology perspective  Please do not hesitate to contact us for any further questions/concerns. We will continue to follow along with you. Electronically signed by Bridegtte Nicholson MD  on 3/4/2020 at 3:11 PM   [facility].   Ph: [phone]

## 2020-03-05 ENCOUNTER — CARE COORDINATION (OUTPATIENT)
Dept: CASE MANAGEMENT | Age: 75
End: 2020-03-05

## 2020-03-05 NOTE — PROGRESS NOTES
Tiburcio Manners here to  patient, papers given to transporter, telemetry off patient. Patient has all belongings.

## 2020-03-05 NOTE — PROGRESS NOTES
Narrator spoke to La at [facility] regarding patient status. No questions at this time, also made aware patient is on way.

## 2020-03-05 NOTE — CARE COORDINATION
[facility] Transitions Initial Follow Up Call    Call within 2 business days of discharge: Yes    Patient: Eleni Walker Patient : 1945   MRN: [id number]  Reason for Admission: COPD Exacerbation  Discharge Date: 3/4/20 RARS: Readmission Risk Score: 46      Last Discharge [facility]       Complaint Diagnosis Description Type Department Provider    20 Shortness of Breath; Cough; Headache COPD exacerbation [facility]) ED to Hosp-Admission (Discharged) (ADMITTED) Mary Ellen Umana MD; Norris Alter... Spoke with: Danita Elmore [facility]    Follow Up:  Patient currently being followed for BPCI-A. Patient was re-admitted on 20-3/4/20 for COPD Exacerbation. Contacted The [address] [place] [address] for initial transition follow up. Spoke with Danita Elmore, nurse at facility. She states that Anival Daley is doing \"okay\". She is on 3 L of oxygen. She states Anival Daley is on a fluid restriction but otherwise eating and drinking w/o issues. When CTN requested to review medication list, nurse stated there has been no changes since patient arrived at the facility. She states medication list is the same as the AVS that she was discharged from the hospital with. Medication review not completed. No questions or concerns at this time. BPCI-A bundle ending. CTN signing off.       Future Appointments   Date Time Provider Yue Qiu   3/12/2020 11:30 AM ST[facility] STA CHAIR 18 [facility] STA MED [place]   3/26/2020  2:20 PM Justine Cameron MD [facility]   3/26/2020  2:30 PM STV STA CHAIR 13 [facility]   2020 11:00 AM Marleny Stephenson MD INFT DISEASE Kiarra De Jesus, LILLY

## 2020-03-24 ENCOUNTER — TELEPHONE (OUTPATIENT)
Dept: ONCOLOGY | Age: 75
End: 2020-03-24

## 2020-03-24 NOTE — TELEPHONE ENCOUNTER
Per MrLynette Hurley request visit for 03/26/2020 is d/c as Jacquelyn Meade is currently in [facility] rehab for 1 to 2 months possibly. Writer spoke with Hammad Bailey ( Nurse caring for Jacquelyn Meade). Explained appointments for this week and that spouse was inquiring if injections of Aranesp could be given there and the Vitamin B12  [place] states yes just need signed orders faxed to [phone]. Reviewed with Dr. Yessenia Nielson and orders received. Faxed with confirmation along with most recent md note. [place] slip to office to reschedule appt.

## 2020-03-25 PROBLEM — N17.9 AKI (ACUTE KIDNEY INJURY) (HCC): Status: RESOLVED | Noted: 2017-12-16 | Resolved: 2020-03-24

## 2020-03-26 ENCOUNTER — HOSPITAL ENCOUNTER (OUTPATIENT)
Dept: INFUSION THERAPY | Facility: MEDICAL CENTER | Age: 75
End: 2020-03-26

## 2020-05-20 NOTE — PROGRESS NOTES
Pt arrives per wheelchair with son and HGB 7.3 and aranesp indicated. Writer asked pt and son where they were recently and son states that pt had been in an ECF and now has home care. Pt receiving Vitamin B 12 injections at home. Son states tried to get all treatment changed to at home due to covid crisis. Aranesp injection tolerated well and slight bleeding at site and pressure held for about 10 seconds and bandaid applied. No further bleeding noted. Pt on Home O2 and wanted to stay for lunch. After eating, pt discharged to  per wheelchair with son to obtain calendar with next appt. Aranesp is due Q 3 weeks.

## 2020-06-10 NOTE — PROGRESS NOTES
Patient arrived per wheelchair with  for injection. Patient denies complaints or concerns. Labs reviewed. Aranesp injection given in left arm without complication. Band aide applied to site. Patient left unit per wheelchair with  at discharge.

## 2020-07-01 NOTE — PROGRESS NOTES
Patient arrived per wheelchair with  for 2 injections. Patient denies concerns or complaints. Labs reviewed. Aranesp injection given without complication. Band aide applied to site. Vitamin B12 injection given without complication. Band aide applied to site. Patient left unit per wheelchair with  at discharge.

## 2020-07-23 NOTE — PROGRESS NOTES
Pt arrives per wheelchair with O2 at 2 L/MIN from home and to [facility] O2 on wall while here. Pt has difficulty being transported to [facility] due to condition, per caregiver and asking if can have vitamin B 12 injections on same day as aranesp, instead of 1 week later each month  Discussed with Chantale Ramos , pharmacist and discussed with [facility] for scheduling and will now have both aranesp and vitamin B 12 given Q 3 weeks. Pt tolerated both injections well and no bleeding at sites and bandaids applied. No reactions or complaints and given calendar with next appt. Pt discharged per wheelchair with caregiver.

## 2020-08-13 NOTE — PROGRESS NOTES
_           Chief Complaint   Patient presents with    Follow-up     Review status of disease     Discuss Labs     DIAGNOSIS:       Previous labs with Macrocytic anemia. Previous Evidence of Iron deficiency. History of thrombocytopenia. Repeated labs today with normocytic anemia. Anemia of chronic renal insufficiency stage IV. Multiple co-morbidities as listed. CURRENT THERAPY:     IV Iron infusion May 2018. Started Aranesp July 2018 for Hb below 10. BRIEF CASE HISTORY:      Ms. Uday Menchaca is a very pleasant 70 y.o. female with history of multiple co-morbidities as listed. She has increasing weakness and fatigue. she presented to the hospital with what she thought was vaginal bleeding and possible hematuria. She was evaluated by GYN and urology. Cystoscopy was done. She had urethral lesion. She was noted to have severe anemia. She denies active bleeding at the present time. No hematemesis or melena. No hematochezia. Patient denies chest pain or palpitation. No SOB. She uses a wheelchair. INTERIM HISTORY:   The patient is seen for follow up anemia. She received Iron infusion in May 2018. She is maintained on Aranesp every 3 weeks for hemoglobin below 10. . Tolerated well. No side effects. She C/O weakness and fatigue. No active bleeding. She had Parkinson disease. She is on treatment. No CP. No headache or dizziness. No weight loss. No active bleeding.      PAST MEDICAL HISTORY: has a past medical history of Acute on chronic diastolic congestive heart failure ([address] Anesthesia complication, Asthma, Atrial fibrillation ([address] Cataracts, bilateral, Cellulitis, Cerebral artery occlusion with cerebral infarction [facility]), CHF (congestive heart failure) ([address] Chronic kidney disease, Chronic kidney disease, Closed fracture of proximal end of right humerus with routine healing, Constipation, calcitriol, furosemide, atorvastatin, carbidopa-levodopa, ropinirole, onetouch verio, pantoprazole, linagliptin, one touch ultra test, amiodarone, magnesium oxide, senna, ferrous sulfate, rasagiline mesylate, and oxygen concentrator. ALLERGIES:  is allergic to dye [barium-containing compounds]; pcn [penicillins]; and bactrim [sulfamethoxazole-trimethoprim]. FAMILY HISTORY: Negative for any hematological or oncological conditions. SOCIAL HISTORY:  reports that she quit smoking about 49 years ago. Her smoking use included cigarettes. She has a 30.00 pack-year smoking history. She has never used smokeless tobacco. She reports previous alcohol use. She reports that she does not use drugs. REVIEW OF SYSTEMS:     · General: + weakness + fatigue. No unanticipated weight loss or decreased appetite. No fever or chills. · Eyes: No blurred vision, eye pain or double vision. · Ears: No hearing problems or drainage. No tinnitus. · Throat: No sore throat, problems with swallowing or dysphagia. · Respiratory: No cough, sputum or hemoptysis. No shortness of breath. No pleuritic chest pain. · Cardiovascular: No chest pain, orthopnea or PND. No lower extremity edema. No palpitation. · Gastrointestinal: No problems with swallowing. No abdominal pain or bloating. No nausea or vomiting. No diarrhea or constipation. No GI bleeding. · Genitourinary: No dysuria, hematuria, frequency or urgency. · Musculoskeletal: ++ limitation of movement. ++ gait disturbance, using wheelchair. · Dermatologic: No skin rashes or pruritus. No skin lesions or discolorations. · Psychiatric: No depression, anxiety, or stress or signs of schizophrenia. No change in mood or affect. · Hematologic: No history of bleeding tendency. No bruises or ecchymosis. No history of clotting problems. · Infectious disease: No fever, chills or frequent infections. · Endocrine: No problems with opacity. No polydipsia or polyuria.  No Results   Component Value Date    IRON 31 (L) 12/31/2019    TIBC 218 (L) 12/31/2019    FERRITIN 226 (H) 09/01/2019         IMPRESSION:   Previous labs with Macrocytic anemia. Previous Evidence of Iron deficiency. History of thrombocytopenia. Repeated labs today with normocytic anemia. Anemia of chronic renal insufficiency stage IV. Multiple co-morbidities as listed. Status post right shoulder fracture. Status post surgery. Recent diagnosis of Parkinson disease    PLAN:   I reviewed the labs as above and discussed with the patient. I explained to the patient the nature of this hematologic problem. I explained the significance of these abnormalities in layman language. She is s/p Iron replacement. Will continue PO Iron replacement. CBC from today showed hemoglobin below 10. Aranesp will be given today. We will continue to monitor. We will give Aranesp for hemoglobin below 10. We will continue vitamin B12 injections. It is anemia of chronic renal insufficiency stage IV. We will give Aranesp 200 mcg every 3 weeks for Hb below 10. We will check the vitamin B12 and folate and iron studies at the time of next blood draw. We will make further recommendations based on response to treatment. Patient's questions were answered to the best of her satisfaction and she verbalized full understanding and agreement. c/o dental pain saw dds 2 x this week for pain   meds not helping on clindya and motrin

## 2020-08-13 NOTE — PROGRESS NOTES
Patient here following MD visit for B 12 injection. Feels well today. B 12 injection given per MAR, band aid to site. Has a return visit scheduled. Discharged ambulatory with family.

## 2020-08-13 NOTE — TELEPHONE ENCOUNTER
KHOI ARRIVES VIA WHEELCHAIR FOR MD VISIT & TX  DR Diamond Clarke IN TO SEE PATIENT  ORDERS RECEIVED  CONTINUE ARANESP & VITAMIN B12 Q 3 WEEKS ADD LABS TO NEXT DRAW  & PRIOR TO RV 4-6 MONTHS  LABS CDP Q 3 WEEKS 8/13, 9/3, 9/24, 10/15, 11/5, 12/3 D/T HOLIDAY, 12/23 D/T HOLIDAY 1/14  ARANESP & VITAMIN B 12 Q 3 WEEKS 8/13, 9/3, 9/24, 10/15, 11/5, 12/3 D/T HOLIDAY , 12/23 D/ T HOLIDAY, 1/14  MD VISIT 1/21/21 @ 2PM  AVS PRINTED AND GIVEN TO PATIENT W/ INSTRUCTIONS  PATIENT DISCHARGED VIA WHEELCHAIR TO [facility]

## 2020-08-19 NOTE — PROCEDURES
INSTRUMENTAL SWALLOW REPORT  MODIFIED BARIUM SWALLOW    NAME: Saniya Garcia   : 1945  MRN: [id number]       Date of Eval: 2020              Referring Diagnosis(es):      Past Medical History:  has a past medical history of Acute on chronic diastolic congestive heart failure ([address] Anesthesia complication, Asthma, Atrial fibrillation ([address] Cataracts, bilateral, Cellulitis, Cerebral artery occlusion with cerebral infarction ([address] CHF (congestive heart failure) ([address] Chronic kidney disease, Chronic kidney disease, Closed fracture of proximal end of right humerus with routine healing, Constipation, Controlled type 2 diabetes mellitus with stage 3 chronic kidney disease, without long-term current use of insulin ([address] COPD (chronic obstructive pulmonary disease) ([address] Difficult intravenous access, Difficulty walking, Diverticulosis, DM2 (diabetes mellitus, type 2) ([address] Full dentures, GERD (gastroesophageal reflux disease), H/O fall, Headache(784.0), [place] (hard of hearing), Hyperlipidemia, Hyperplastic polyp of intestine, Hypertension, Impaired ambulation, Kidney stone, Living in nursing home, Morbid obesity with BMI of 40.0-44.9, adult ([address] Muscle weakness, Nephrolithiasis, Open wound, ECTOR on CPAP, Osteoarthritis, Parkinson disease ([address] Restless leg syndrome, Spinal stenosis in cervical region, Type II or unspecified type diabetes mellitus without mention of complication, not stated as uncontrolled, Urethral caruncle, Wears glasses, and Wears glasses. Past Surgical History:  has a past surgical history that includes Cervical disc surgery (); Appendectomy; Tonsillectomy and adenoidectomy (); hernia repair (); eye surgery (Bilateral, 2017); Cervical spine surgery (13); laminectomy (2013); Upper gastrointestinal endoscopy (2016); Colonoscopy (); Colonoscopy (2016); Colonoscopy (2016);  Colonoscopy (2017); pr colsc flx w/removal lesion by hot bx forceps (N/A, 4/25/2017); Cystorrhaphy (04/04/2018); pr cystourethroscopy,biopsies (N/A, 4/4/2018); pr [facility] incl fluor gdnce dx w/cell washg spx (N/A, 6/5/2018); Upper gastrointestinal endoscopy (N/A, 8/29/2018); shoulder fracture surgery (Right, 5/28/2019); Hardware Removal (Right, 07/05/2019); and Hardware Removal (Right, 7/5/2019). Patient Complaints/Reason for Referral:  Nidia Chairez was referred for a MBS to assess the efficiency of his/her swallow function, assess for aspiration, and to make recommendations regarding safe dietary consistencies, effective compensatory strategies, and safe eating environment. Onset of problem:     28-year-old white gentlewoman with multiple medical issues comes in with COPD Parkisons  And increased choking with PO intake. Pt. Reports decreased PO intake due to choking    Behavior/Cognition/Vision/Hearing:  Vision: Impaired  Hearing: Exceptions to [facility]    Impressions:  Patient presents with  safe swallow for Regular diet with thin liquids via cup as evidenced by no observed aspiration noted with consistencies tested. + deep penetration with thin via straw. Recommend small sips and bites, only feed when alert and awake and upright at 90 degrees for all PO intake. Recommend close monitoring for overt/clinical s/s of aspiration and D/C PO intake and complete Modified Barium Swallow Study should they occur. May need to thicken liquids to nectar if choking persists at home.     Treatment Dx and ICD 10: R13.1     Compensatory Swallowing Strategies Attempted: Eat/Feed slowly;Upright as possible for all oral intake;Small bites/sips  Postural Changes and/or Swallow Maneuvers Trialed: Upright 90 degrees          Recommended Diet:  Solid consistency: Regular  Liquid consistency: Thin(Thin: by cup ONLY or Recommend [place] if pt. with coughing/choking with thin at home)  Liquid administration via: Cup    Medication administration: Meds in puree    Safe Swallow Protocol: Compensatory Swallowing Strategies: Eat/Feed slowly;Upright as possible for all oral intake;Small bites/sips    Recommendations/Treatment  Requires SLP Intervention: Yes        D/C Recommendations: 24 hour supervision/assistance  Postural Changes and/or Swallow Maneuvers: Upright 90 degrees      Recommended Exercises:    Therapeutic Interventions: Diet tolerance monitoring; Laryngeal exercises; Pharyngeal exercises; [place]; Tongue base strengthening;Vital Stim/NMES         Education: Images and recommendations were reviewed with pt following this exam.   Patient Education: yes  Patient Education Response: Verbalizes understanding    Prognosis  Prognosis for safe diet advancement: good  Duration/Frequency of Treatment  Duration/Frequency of Treatment: 2-3X      Goals:    Long Term:     To Maximize safety with intake, optimize nutrition/hydration and minimize risk for aspiration. Short Term:     Goal 1: O/M treatment program for dysphagia      Oral Preparation / Oral Phase  Oral Phase: WFL   Adequate mastication and oral manipulation for consistencies tested      Pharyngeal Phase  Pharyngeal Phase: Impaired  Pharyngeal: Thin: cup no penetration and no aspiraiton, Straw: + deep penetration with no cough and no aspiration. Nectar: no penetration and no aspiration with no stasis. Puree, Fruit: No penetration and no aspiration with no stasis. Cookie: No penetration and no aspiration with mild stasis      Dysphagia Outcome Severity Scale: Level 5: Mild dysphagia- Distant supervision.  May need one diet consistency restricted  Penetration-Aspiration Scale (PAS): 2 - Material enters the airway, remains above the vocal folds, and is ejected from the airway        Esophageal Phase  Esophageal Screen: [facility]        Pain   Patient Currently in Pain: No         Therapy Time:   Individual Concurrent Group Co-treatment   Time In 1030         Time Out [address] [facility], 8/19/2020, 11:23

## 2020-09-23 NOTE — ED PROVIDER NOTES
EMERGENCY DEPARTMENT ENCOUNTER    Pt Name: Ken Tang  MRN: [id number]  [place] 1945  Date of evaluation: 9/23/20  CHIEF COMPLAINT       Chief Complaint   Patient presents with    Fatigue     HISTORY OF PRESENT ILLNESS   Patient is a 26-year-old female with multiple comorbidities including atrial fibrillation on Eliquis, CHF, on 2 L home O2 and hypertension who is brought in by EMS for evaluation of weakness and feeling dehydrated. Patient's biggest complaint with me is that she has a lump on her vocal cord that bothers her. Also she feels weak and dehydrated. She also complains of decreased hearing. No fevers, cough, shortness of breath, chest pain, abdominal pain, nausea, vomiting, changes in urine or stool. Per report from , patient was using the bathroom, straining secondary constipation, she was in pain, blood pressure dropped, he did not think she looked well and called EMS. He states that in the ED patient is back to baseline and looking much better. REVIEW OF SYSTEMS     Review of Systems   All other systems reviewed and are negative. PASTMEDICAL HISTORY     Past Medical History:   Diagnosis Date    Acute on chronic diastolic congestive heart failure ([address]     Anesthesia complication     DIFFICULTY WAKING OP    Asthma     Atrial fibrillation ([address] 2013    Cataracts, bilateral     Cellulitis     BILAT LOWER LEGS-PT STATES IS IMPROVING    Cerebral artery occlusion with cerebral infarction [facility])     Last stroke was February 2017 w/no deficits-TOTAL OF 3    CHF (congestive heart failure) (HCC)     Chronic kidney disease 2013    NOT ON DIALYSIS YET    Chronic kidney disease     Closed fracture of proximal end of right humerus with routine healing     Constipation     CHRONIC    Controlled type 2 diabetes mellitus with stage 3 chronic kidney disease, without long-term current use of insulin (HCC)     COPD (chronic obstructive pulmonary disease) ([address] 2008    PT.  SEES DR. Ирина Lipscomb. Home O2 at 2-3L/NC 24/7.     Difficult intravenous access     VEINS ROLL    Difficulty walking     AMBULATES WITH THERAPPY    Diverticulosis     DM2 (diabetes mellitus, type 2) ([facility] 2008    Full dentures     FULL UPPER ONLY    GERD (gastroesophageal reflux disease) 2008    ON RX    H/O fall     Headache(784.0)     [place] (hard of hearing)     HAS HEARING AIDS BUT DOES NOT WEAR    Hyperlipidemia     Hyperplastic polyp of intestine     Hypertension 2008    Impaired ambulation     USES TRANFER CHAIR WALKER OR CANE    Kidney stone 2018    PRESENTLY-SMALL    Living in nursing home     [address]    Morbid obesity with BMI of 40.0-44.9, adult ([facility] 12/30/2016    Muscle weakness     Nephrolithiasis 3/8/2018    Open wound     COCCYX    ECTOR on CPAP 2008    USES C-PAP NIGHTLY    Osteoarthritis     OSTEOARTHRITIS    Parkinson disease ([facility] 2015    Restless leg syndrome 2013    MILD    Spinal stenosis in cervical region 6/2/2013    Type II or unspecified type diabetes mellitus without mention of complication, not stated as uncontrolled     Urethral caruncle 3/8/2018    Wears glasses     Wears glasses      SURGICAL HISTORY       Past Surgical History:   Procedure Laterality Date    APPENDECTOMY      CERVICAL [address] SURGERY  2012    CERVICAL SPINE SURGERY  5/31/13    posterior [id number]    COLONOSCOPY  2009    COLONOSCOPY  12/29/2016    incomplete was not cleaned out    COLONOSCOPY  12/30/2016    COLONOSCOPY  04/25/2017     SIGMOID COLON POLYPECTOMY:  HYPERPLASTIC POLYP ,   DIVERTICULOSIS    CYSTORRHAPHY  04/04/2018    EYE SURGERY Bilateral 2017    CATARACTS W/ IOL    HARDWARE REMOVAL Right 07/05/2019    HARDWARE REMOVAL PINS  HUMERUS     HARDWARE REMOVAL Right 7/5/2019    HARDWARE REMOVAL PINS  HUMERUS  C-ARM performed by Son Mcdonald MD at [address] [facility]  05/31/2013    Dr. Romeo Horan [address] INCL FLUOR GDNCE DX W/CELL WASHG SPX N/A 6/5/2018    BRONCHOSCOPY performed by Jay Pike MD at [facility] FLX W/REMOVAL LESION BY HOT BX FORCEPS N/A 4/25/2017    COLONOSCOPY POLYPECTOMY HOT BIOPSY performed by Jessa Arce MD at [facility] N/A 4/4/2018    CYSTOSCOPY performed by Kiran Choudhary MD at [address] Right 5/28/2019    SHOULDER CLOSED REDUCTION PERCUTANEOUS PINNING RIGHT performed by Genevieve Tompkins MD at [place] [id number]  12/28/2016    gastritis, esophagitis,     UPPER GASTROINTESTINAL ENDOSCOPY N/A 8/29/2018    EGD BIOPSY performed by Jessa Arce MD at [facility]       Previous Medications    ALBUTEROL (PROVENTIL) (2.5 MG/3ML) 0.083% NEBULIZER SOLUTION    Take 3 mLs by nebulization 4 times daily    AMIODARONE (CORDARONE) 200 MG TABLET    Take 200 mg by mouth daily     APIXABAN (ELIQUIS) 5 MG TABS TABLET    Take 1 tablet by mouth 2 times daily    ATORVASTATIN (LIPITOR) 20 MG TABLET    TAKE 1 TABLET DAILY    CALCITRIOL (ROCALTROL) 0.25 MCG CAPSULE    Take 0.75 mcg by mouth daily     CALCIUM-VITAMIN D-VITAMIN K 650-12.5-40 MG-MCG-MCG CHEW    Take 1 tablet by mouth daily as needed (low calcium intake)    CARBIDOPA-LEVODOPA (SINEMET CR)  MG PER EXTENDED RELEASE TABLET    Take 1 tablet by mouth 4 times daily    CYANOCOBALAMIN (B-12) 5000 MCG CAPS    Take 5,000 mcg by mouth daily    FERROUS SULFATE 325 (65 FE) MG EC TABLET    Take 1 tablet by mouth 2 times daily (with meals)    FLUTICASONE-VILANTEROL (BREO ELLIPTA) 100-25 MCG/INH AEPB INHALER    Inhale 2 puffs into the lungs daily    FUROSEMIDE (LASIX) 20 MG TABLET    Take 1 tablet by mouth daily    ISOSORBIDE MONONITRATE (IMDUR) 30 MG EXTENDED RELEASE TABLET    Take 1 tablet by mouth daily    LINAGLIPTIN (TRADJENTA) 5 MG TABLET    Take 0.5 tablets by mouth daily    MAGNESIUM OXIDE (MAG-OX) 400 (241.3 MG) MG TABS TABLET    Take 1 tablet by mouth 2 times daily    MELATONIN 5 MG TABS TABLET    Take 5 mg by mouth nightly as needed (sleep)    METOPROLOL TARTRATE (LOPRESSOR) 25 MG TABLET    Take 1 tablet by mouth daily    MICONAZOLE (MICOTIN) 2 % POWDER    Apply topically 2 times daily. ONE TOUCH ULTRA TEST STRIP    USE 1 STRIP THREE TIMES A DAY    ONETOUCH VERIO STRIP    1 each by In Vitro route daily As needed. OXYGEN CONCENTRATOR    Dx: Pneumonia, use as directed. PANTOPRAZOLE (PROTONIX) 40 MG TABLET    TAKE 1 TABLET TWICE A DAY BEFORE MEALS    QUETIAPINE (SEROQUEL XR) 50 MG EXTENDED RELEASE TABLET    Take 50 mg by mouth nightly    RASAGILINE MESYLATE 1 MG TABS    Take 1 tablet by mouth daily    ROPINIROLE (REQUIP) 2 MG TABLET    Take 1 tablet by mouth 3 times daily    SENNA (SENOKOT) 8.6 MG TABLET    Take 1-2 tablets by mouth nightly     TIOTROPIUM (SPIRIVA) 18 MCG INHALATION CAPSULE    Inhale 18 mcg into the lungs daily     ALLERGIES     is allergic to dye [barium-containing compounds]; pcn [penicillins]; and bactrim [sulfamethoxazole-trimethoprim]. FAMILY HISTORY     She indicated that her mother is . She indicated that her father is . She indicated that her maternal grandmother is . She indicated that her maternal grandfather is . She indicated that her paternal grandmother is . She indicated that her paternal grandfather is . SOCIAL HISTORY       Social History     Tobacco Use    Smoking status: Former Smoker     Packs/day: 2.00     Years: 15.00     Pack years: 30.00     Types: Cigarettes     Last attempt to quit: 10/31/1970     Years since quittin.9    Smokeless tobacco: Never Used   Substance Use Topics    Alcohol use: Not Currently    Drug use: No     Comment: Pt denies use.       PHYSICAL EXAM     INITIAL VITALS: /60   Pulse 54   Temp 98 °F (36.7 °C)   Resp 16   Ht 4' 9\" (1.448 m)   Wt 170 lb (77.1 kg) LMP 04/03/2004 (Within Years)   SpO2 100%   BMI 36.79 kg/m²    Physical Exam  Constitutional:       Appearance: Normal appearance. HENT:      Head: Normocephalic. Right Ear: Tympanic membrane normal.      Left Ear: Tympanic membrane normal.      Nose: No congestion or rhinorrhea. Mouth/Throat:      Mouth: Mucous membranes are moist.   Eyes:      Extraocular Movements: Extraocular movements intact. Pupils: Pupils are equal, round, and reactive to light. Cardiovascular:      Rate and Rhythm: Normal rate and regular rhythm. Pulses: Normal pulses. Heart sounds: Normal heart sounds. No murmur. No friction rub. Pulmonary:      Effort: No respiratory distress. Breath sounds: No stridor. No wheezing or rhonchi. Abdominal:      General: There is no distension. Tenderness: There is no abdominal tenderness. There is no guarding or rebound. Musculoskeletal:         General: No tenderness. Skin:     Coloration: Skin is not jaundiced. Findings: No lesion or rash. Neurological:      General: No focal deficit present. Mental Status: She is alert and oriented to person, place, and time. Psychiatric:         Mood and Affect: Mood normal.         Behavior: Behavior normal.         Judgment: Judgment normal.         MEDICAL DECISION MAKING:   The patient is hemodynamically stable, afebrile, nontoxic-appearing. Physical exam unremarkable. Based on history and exam unclear etiology of symptoms. Possibly infectious process in urine. ED plan for basic labs, troponin, BNP, fluids, chest x-ray, reassess.          CRITICAL CARE:       PROCEDURES:    Procedures    DIAGNOSTIC RESULTS   EKG:All EKG's are interpreted by the Emergency Department Physician who either signs or Co-signs this chart in the absence of a cardiologist.        RADIOLOGY:All plain film, CT, MRI, and formal ultrasound images (except ED bedside ultrasound) are read by the radiologist, see reports below, unless otherwisenoted in MDM or here. XR CHEST PORTABLE   Final Result   Cardiomegaly, without evidence of CHF           LABS: All lab results were reviewed by myself, and all abnormals are listed below. Labs Reviewed   CBC WITH AUTO DIFFERENTIAL - Abnormal; Notable for the following components:       Result Value    RBC 2.73 (*)     Hemoglobin 8.6 (*)     Hematocrit 29.7 (*)     .8 (*)     RDW 15.8 (*)     Platelets 66 (*)     Seg Neutrophils 77 (*)     Lymphocytes 12 (*)     Immature Granulocytes 1 (*)     Absolute Lymph # 0.89 (*)     All other components within normal limits   COMPREHENSIVE METABOLIC PANEL W/ REFLEX TO MG FOR LOW K - Abnormal; Notable for the following components:    Glucose 122 (*)     BUN 44 (*)     CREATININE 2.12 (*)     Bun/Cre Ratio 21 (*)     Calcium 7.9 (*)     Chloride 97 (*)     CO2 34 (*)     ALT <5 (*)     Total Bilirubin 0.18 (*)     Total Protein 6.0 (*)     GFR Non- 23 (*)     GFR  28 (*)     All other components within normal limits   TROPONIN - Abnormal; Notable for the following components:    Troponin, High Sensitivity 23 (*)     All other components within normal limits   BRAIN NATRIURETIC PEPTIDE - Abnormal; Notable for the following components:    Pro- (*)     All other components within normal limits   URINE RT REFLEX TO CULTURE - Abnormal; Notable for the following components:    Turbidity UA CLOUDY (*)     Urine Hgb 3+ (*)     Protein, UA 1+ (*)     Leukocyte Esterase, Urine TRACE (*)     All other components within normal limits   MICROSCOPIC URINALYSIS - Abnormal; Notable for the following components:    Bacteria, UA FEW (*)     All other components within normal limits   LACTIC ACID       EMERGENCY DEPARTMENTCOURSE:   Patient did well in the ED  Symptoms improved with fluids. Labs remarkable for potassium 4.2   Creatinine 2.12  Lactic acid 0.9  Troponin 23    WBC 7.4  UA with leukocytes and bacteria.   EKG nonischemic. Chest x-ray negative for infiltrate. Will treat for UTI with Macrobid. No further work-up indicated at this time. Nursing notes reviewed. At this time this is what I find, the patient appears well and does not appear sick or toxic. I gave my usual and customary discussion of the risks and benefits of discharge versus admission. I answered the patient's questions. I gave the patient strict return precautions. Patient expressed understanding of the discharge instructions. Dictated but not reviewed. Vitals:    Vitals:    09/23/20 0032 09/23/20 0113 09/23/20 0129 09/23/20 0146   BP: (!) 101/43 (!) 112/43 (!) 119/45 110/60   Pulse: 55 52 52 54   Resp: 11 14 10 16   Temp:       SpO2: 99% 100%     Weight:       Height:           The patient was given the following medications while in the emergency department:  Orders Placed This Encounter   Medications    DISCONTD: 0.9 % sodium chloride bolus    0.9 % sodium chloride bolus    nitrofurantoin (macrocrystal-monohydrate) (MACROBID) capsule 100 mg    nitrofurantoin, macrocrystal-monohydrate, (MACROBID) 100 MG capsule     Sig: Take 1 capsule by mouth 2 times daily for 3 days     Dispense:  6 capsule     Refill:  0     CONSULTS:  None    FINAL IMPRESSION      1. General weakness    2.  Acute cystitis without hematuria          DISPOSITION/PLAN   DISPOSITION        PATIENT REFERRED TO:  MD Jj Monreal [facility] [address] [id number]  [phone]    In 2 days      DISCHARGE MEDICATIONS:  New Prescriptions    NITROFURANTOIN, MACROCRYSTAL-MONOHYDRATE, (MACROBID) 100 MG CAPSULE    Take 1 capsule by mouth 2 times daily for 3 days     Mike Abdi MD  Attending Emergency Physician                    Loretta Stafford MD  09/23/20 [id number]

## 2020-09-24 NOTE — PROGRESS NOTES
Patient here for labs and injections. No complaints today. Vitals obtained. B 12 given per [facility], band aid to site. Hemoglobin =8.5. Aranesp given per [facility], band aid to site. Has a return visit scheduled.   Discharged ambulatory in a wheelchair with her

## 2020-10-15 NOTE — PROGRESS NOTES
Patient arrived per wheelchair with  for injections. Patient has no new complaints or concerns. Labs reviewed. Vitamin B12 and aranesp injections given without complication. Band aides applied to sites. Patient left unit per wheelchair with  at discharge.

## 2020-10-22 NOTE — ED PROVIDER NOTES
[address] [place] [address] ED  EMERGENCY DEPARTMENT ENCOUNTER      Pt Name: Trinidad Man  MRN: [id number]  [place] 1945  Date of evaluation: 10/22/2020  Provider: Paras Lemon MD    CHIEF COMPLAINT       Chief Complaint   Patient presents with    Shortness of Breath         HISTORY OF PRESENT ILLNESS  (Location/Symptom, Timing/Onset, Context/Setting, Quality, Duration, Modifying Factors, Severity.)   Trinidad Man is a 70 y.o. female who presents to the emergency department for difficulty breathing. This started 1 hour before coming in. She was noted to have a low O2 sat and was placed on CPAP by the paramedics and transported here. She has not had a known fever and has not had a cough. No vomiting. She has a history of COPD and CHF. Symptom has been continuous and she felt better with the CPAP. Nursing Notes were reviewed.     ALLERGIES     Dye [barium-containing compounds]; Pcn [penicillins]; and Bactrim [sulfamethoxazole-trimethoprim]    CURRENT MEDICATIONS       Previous Medications    ALBUTEROL (PROVENTIL) (2.5 MG/3ML) 0.083% NEBULIZER SOLUTION    Take 3 mLs by nebulization 4 times daily    AMIODARONE (CORDARONE) 200 MG TABLET    Take 200 mg by mouth daily     APIXABAN (ELIQUIS) 5 MG TABS TABLET    Take 1 tablet by mouth 2 times daily    ATORVASTATIN (LIPITOR) 20 MG TABLET    TAKE 1 TABLET DAILY    CALCITRIOL (ROCALTROL) 0.25 MCG CAPSULE    Take 0.75 mcg by mouth daily     CALCIUM-VITAMIN D-VITAMIN K 650-12.5-40 MG-MCG-MCG CHEW    Take 1 tablet by mouth daily as needed (low calcium intake)    CARBIDOPA-LEVODOPA (SINEMET CR)  MG PER EXTENDED RELEASE TABLET    Take 1 tablet by mouth 4 times daily    CYANOCOBALAMIN (B-12) 5000 MCG CAPS    Take 5,000 mcg by mouth daily    FERROUS SULFATE 325 (65 FE) MG EC TABLET    Take 1 tablet by mouth 2 times daily (with meals)    FLUTICASONE-VILANTEROL (BREO ELLIPTA) 100-25 MCG/INH AEPB INHALER    Inhale 2 puffs into the lungs daily    FUROSEMIDE (LASIX) 20 MG TABLET    Take 1 tablet by mouth daily    ISOSORBIDE MONONITRATE (IMDUR) 30 MG EXTENDED RELEASE TABLET    Take 1 tablet by mouth daily    LINAGLIPTIN (TRADJENTA) 5 MG TABLET    Take 0.5 tablets by mouth daily    MAGNESIUM OXIDE (MAG-OX) 400 (241.3 MG) MG TABS TABLET    Take 1 tablet by mouth 2 times daily    MELATONIN 5 MG TABS TABLET    Take 5 mg by mouth nightly as needed (sleep)    METOPROLOL TARTRATE (LOPRESSOR) 25 MG TABLET    Take 1 tablet by mouth daily    MICONAZOLE (MICOTIN) 2 % POWDER    Apply topically 2 times daily. ONE TOUCH ULTRA TEST STRIP    USE 1 STRIP THREE TIMES A DAY    ONETOUCH VERIO STRIP    1 each by In Vitro route daily As needed. OXYGEN CONCENTRATOR    Dx: Pneumonia, use as directed.     PANTOPRAZOLE (PROTONIX) 40 MG TABLET    TAKE 1 TABLET TWICE A DAY BEFORE MEALS    QUETIAPINE (SEROQUEL XR) 50 MG EXTENDED RELEASE TABLET    Take 50 mg by mouth nightly    RASAGILINE MESYLATE 1 MG TABS    Take 1 tablet by mouth daily    ROPINIROLE (REQUIP) 2 MG TABLET    Take 1 tablet by mouth 3 times daily    SENNA (SENOKOT) 8.6 MG TABLET    Take 1-2 tablets by mouth nightly     TIOTROPIUM (SPIRIVA) 18 MCG INHALATION CAPSULE    Inhale 18 mcg into the lungs daily       PAST MEDICAL HISTORY         Diagnosis Date    Acute on chronic diastolic congestive heart failure (HCC)     Anesthesia complication     DIFFICULTY WAKING OP    Asthma     Atrial fibrillation ([address] 2013    Cataracts, bilateral     Cellulitis     BILAT LOWER LEGS-PT STATES IS IMPROVING    Cerebral artery occlusion with cerebral infarction [facility])     Last stroke was February 2017 w/no deficits-TOTAL OF 3    CHF (congestive heart failure) (HCC)     Chronic kidney disease 2013    NOT ON DIALYSIS YET    Chronic kidney disease     Closed fracture of proximal end of right humerus with routine healing     Constipation     CHRONIC    Controlled type 2 diabetes mellitus with stage 3 chronic kidney disease, without long-term current use of insulin ([address]     COPD (chronic obstructive pulmonary disease) ([address] 2008    PT. SEES DR. BECK. Home O2 at 2-3L/NC 24/7.     Difficult intravenous access     VEINS ROLL    Difficulty walking     AMBULATES WITH THERAPPY    Diverticulosis     DM2 (diabetes mellitus, type 2) ([address] 2008    Full dentures     FULL UPPER ONLY    GERD (gastroesophageal reflux disease) 2008    ON RX    H/O fall     Headache(784.0)     [place] (hard of hearing)     HAS HEARING AIDS BUT DOES NOT WEAR    Hyperlipidemia     Hyperplastic polyp of intestine     Hypertension 2008    Impaired ambulation     USES TRANFER CHAIR WALKER OR CANE    Kidney stone 2018    PRESENTLY-SMALL    Living in nursing home     [address]    Morbid obesity with BMI of 40.0-44.9, adult ([facility] 12/30/2016    Muscle weakness     Nephrolithiasis 3/8/2018    Open wound     COCCYX    ECTOR on CPAP 2008    USES C-PAP NIGHTLY    Osteoarthritis     OSTEOARTHRITIS    Parkinson disease ([address] 2015    Restless leg syndrome 2013    MILD    Spinal stenosis in cervical region 6/2/2013    Type II or unspecified type diabetes mellitus without mention of complication, not stated as uncontrolled     Urethral caruncle 3/8/2018    Wears glasses     Wears glasses        SURGICAL HISTORY           Procedure Laterality Date    APPENDECTOMY      CERVICAL [address] SURGERY  2012    CERVICAL SPINE SURGERY  5/31/13    posterior [id number]    COLONOSCOPY  2009    COLONOSCOPY  12/29/2016    incomplete was not cleaned out    COLONOSCOPY  12/30/2016    COLONOSCOPY  04/25/2017     SIGMOID COLON POLYPECTOMY:  HYPERPLASTIC POLYP ,   DIVERTICULOSIS    CYSTORRHAPHY  04/04/2018    EYE SURGERY Bilateral 2017    CATARACTS W/ IOL    HARDWARE REMOVAL Right 07/05/2019    HARDWARE REMOVAL PINS  HUMERUS     HARDWARE REMOVAL Right 7/5/2019    HARDWARE REMOVAL PINS  HUMERUS  C-ARM performed by Vivi Murillo MD at [address] [facility] [address] [facility]  2013    Dr. Paulino Waddell DX W/CELL WASHG [address] [facility] N/A 2018    BRONCHOSCOPY performed by Sigifredo Walter MD at [address] N/A 2017    COLONOSCOPY POLYPECTOMY HOT BIOPSY performed by Kali Ramirez MD at [address] [facility] N/A 2018    CYSTOSCOPY performed by Jasmin Dougherty MD at [address] Right 2019    SHOULDER CLOSED REDUCTION PERCUTANEOUS PINNING RIGHT performed by Ros Bearden MD at [address] [facility]  2016    gastritis, esophagitis,     UPPER GASTROINTESTINAL ENDOSCOPY N/A 2018    [facility]SY [facility]           Problem Relation Age of Onset    Heart Failure Mother     Hypertension Mother     Heart Disease Mother     High Blood Pressure Mother      Family Status   Relation Name Status    Mother      Father      MGM      MGF      [facility]      PGF          SOCIAL HISTORY      reports that she quit smoking about 50 years ago. Her smoking use included cigarettes. She has a 30.00 pack-year smoking history. She has never used smokeless tobacco. She reports previous alcohol use. She reports that she does not use drugs. REVIEW OF SYSTEMS    (2-9 systems for level 4, 10 or more for level 5)     Review of Systems   Constitutional: Negative for chills, fatigue and fever. HENT: Negative for congestion, ear discharge and facial swelling. Eyes: Negative for discharge and redness. Respiratory: Positive for shortness of breath and wheezing. Negative for cough. Cardiovascular: Negative for chest pain. Gastrointestinal: Negative for abdominal pain, constipation, diarrhea and vomiting. Genitourinary: Negative for dysuria and hematuria.    Musculoskeletal: Negative for arthralgias. Skin: Negative for color change and rash. Neurological: Negative for syncope, numbness and headaches. Hematological: Negative for adenopathy. Psychiatric/Behavioral: Negative for confusion. The patient is not nervous/anxious. Except as noted above the remainder of the review of systems was reviewed and negative. PHYSICAL EXAM    (up to 7 for level 4, 8 or more for level 5)     Vitals:    10/22/20 1645 10/22/20 1700 10/22/20 1706 10/22/20 1721   BP:       Pulse: 71   66   Resp: 10  11 14   Temp:       TempSrc:       SpO2: 100% 99% 100% 100%   Weight:           Physical Exam  Vitals signs reviewed. Constitutional:       General: She is not in acute distress. Appearance: She is well-developed. She is not diaphoretic. Comments: She is talking in full sentences. HENT:      Head: Normocephalic and atraumatic. Eyes:      General:         Right eye: No discharge. Left eye: No discharge. Neck:      Musculoskeletal: Neck supple. Cardiovascular:      Rate and Rhythm: Normal rate and regular rhythm. Pulmonary:      Effort: No respiratory distress. Breath sounds: No stridor. Abdominal:      General: There is no distension. Musculoskeletal: Normal range of motion. Skin:     Findings: No erythema or rash. Neurological:      Mental Status: She is alert and oriented to person, place, and time. Psychiatric:         Behavior: Behavior normal.       Limited physical exam carried out due to risk of viral transmission.       DIAGNOSTIC RESULTS     EKG: All EKG's are interpreted by the Emergency Department Physician who either signs or Co-signs this chart in the absence of a cardiologist.    EKG on my interpretation shows no acute findings    RADIOLOGY:   Non-plain film images such as CT, Ultrasound and MRI are read by the radiologist. Plain radiographic images are visualized and preliminarily interpreted by the emergency physician with the below findings:    Interpretation per the Radiologist below, if available at the time of this note:    Xr Chest Portable    Result Date: 10/22/2020  EXAMINATION: ONE XRAY VIEW OF THE CHEST 10/22/2020 5:02 pm COMPARISON: 09/23/2020 HISTORY: ORDERING SYSTEM PROVIDED HISTORY: SOB TECHNOLOGIST PROVIDED HISTORY: SOB Reason for Exam: Sob today Acuity: Acute Type of Exam: Initial FINDINGS: The lungs are without acute focal process. There is no effusion or pneumothorax. The cardiomediastinal silhouette is stable. The osseous structures are stable. No acute process. Stable cardiomegaly     LABS:  Labs Reviewed   CBC WITH AUTO DIFFERENTIAL - Abnormal; Notable for the following components:       Result Value    RBC 2.92 (*)     Hemoglobin 9.3 (*)     Hematocrit 31.8 (*)     .9 (*)     RDW 16.0 (*)     Platelets 267 (*)     Seg Neutrophils 69 (*)     Lymphocytes 18 (*)     Immature Granulocytes 2 (*)     All other components within normal limits   BASIC METABOLIC PANEL - Abnormal; Notable for the following components:    Glucose 170 (*)     BUN 32 (*)     CREATININE 2.02 (*)     Calcium 7.8 (*)     Chloride 95 (*)     CO2 34 (*)     GFR Non- 24 (*)     GFR  29 (*)     All other components within normal limits   TROP/MYOGLOBIN - Abnormal; Notable for the following components:    Troponin, High Sensitivity 30 (*)     Myoglobin 110 (*)     All other components within normal limits   BRAIN NATRIURETIC PEPTIDE - Abnormal; Notable for the following components:    Pro- (*)     All other components within normal limits   COVID-19   TROP/MYOGLOBIN       All other labs were within normal range or not returned as of this dictation.     EMERGENCY DEPARTMENT COURSE and DIFFERENTIAL DIAGNOSIS/MDM:   Vitals:    Vitals:    10/22/20 1645 10/22/20 1700 10/22/20 1706 10/22/20 1721   BP:       Pulse: 71   66   Resp: 10  11 14   Temp:       TempSrc:       SpO2: 100% 99% 100% 100%   Weight:

## 2020-10-23 NOTE — PLAN OF CARE
Nutrition Problem #1: Inadequate oral intake  Intervention: Food and/or Nutrient Delivery: Modify Current Diet  Nutritional Goals: meet greater than 75% of estimated nutrition needs

## 2020-10-23 NOTE — FLOWSHEET NOTE
10/22/20 2211   Vital Signs   Temp 99.1 °F (37.3 °C)   Temp Source Oral   Pulse 89   Heart Rate Source Monitor   Resp 20   BP (!) 150/65   BP Location Left Arm   BP Upper/Lower Upper   MAP (mmHg) 88   Patient arrived to room 1017 on PCU and hand off was given over the phone and at bedside. Patient is A&O, VS listed above, pulse ox applied, telemetry applied, and orientation to the room was performed. Patient left in a safe environment, bed in lowest position, call light within reach, side rails up 2/4. RN will assess and continue to monitor.

## 2020-10-23 NOTE — PROGRESS NOTES
Patient was very anxious on arrival to the unit and when RN went in to reassess her and give her a lunch box, she was sleeping peacefully. RN did not wake her d/t the anxious state she was previously in. RN will continue to monitor and reassess.

## 2020-10-23 NOTE — PROGRESS NOTES
Johanna Bo was ordered Calcium-Vitamin D-Vitamin K. As per the [address], herbals and certain dietary supplements will be discontinued. The herbal or dietary supplement may be continued after discharge from the hospital.     If you feel the patient needs to continue their home herbal therapy during the inpatient stay, the patient may bring an unopened bottle for verification and administration pursuant to our home medication use policy. Please contact the pharmacy with any questions or concerns. Thank you.   Millie eDe   10/22/2020  10:08 PM

## 2020-10-23 NOTE — H&P
History and Physical      CHIEF COMPLAINT:  dyspnea  History of Present Illness: Few day history of shortness of breath rated 6/10 continuous so worse with exertion better with rest and xygen, positive tachypnea occasional wheezing no cough no fever no chills, no pedal edema no PND no orthopnea no chest pain no palpitation no dizziness no syncopal episode  Past Medical History:   Diagnosis Date    Acute on chronic diastolic congestive heart failure ([facility]     Anesthesia complication     DIFFICULTY WAKING OP    Asthma     Atrial fibrillation ([facility] 2013    Cataracts, bilateral     Cellulitis     BILAT LOWER LEGS-PT STATES IS IMPROVING    Cerebral artery occlusion with cerebral infarction [facility])     Last stroke was February 2017 w/no deficits-TOTAL OF 3    CHF (congestive heart failure) ([facility])     Chronic kidney disease 2013    NOT ON DIALYSIS YET    Chronic kidney disease     Closed fracture of proximal end of right humerus with routine healing     Constipation     CHRONIC    Controlled type 2 diabetes mellitus with stage 3 chronic kidney disease, without long-term current use of insulin ([facility])     COPD (chronic obstructive pulmonary disease) ([facility] 2008    PT. SEES DR. BECK. Home O2 at 2-3L/NC 24/7.     Difficult intravenous access     VEINS ROLL    Difficulty walking     AMBULATES WITH THERAPPY    Diverticulosis     DM2 (diabetes mellitus, type 2) ([facility] [address] [facility] 2008    Full dentures     FULL UPPER ONLY    GERD (gastroesophageal reflux disease) 2008    ON RX    H/O fall     Headache(784.0)     [place]     HAS HEARING AIDS BUT DOES NOT WEAR    Hyperlipidemia     Hyperplastic polyp of intestine     Hypertension 2008    Impaired ambulation     USES TRANFER CHAIR WALKER OR CANE    Kidney stone 2018    PRESENTLY-SMALL    Living in nursing home     [address]    Morbid obesity with BMI of 40.0-44.9, adult ([facility] [address] 12/30/2016    Muscle weakness  Nephrolithiasis 3/8/2018    Open wound     COCCYX    ECTOR on CPAP 2008    USES C-PAP NIGHTLY    Osteoarthritis     OSTEOARTHRITIS    Parkinson disease ([address] 2015    Restless leg syndrome 2013    MILD    Spinal stenosis in cervical region 6/2/2013    Type II or unspecified type diabetes mellitus without mention of complication, not stated as uncontrolled     Urethral caruncle 3/8/2018    Wears glasses     Wears glasses          Past Surgical History:   Procedure Laterality Date    APPENDECTOMY      CERVICAL [address] SURGERY  2012    CERVICAL SPINE SURGERY  5/31/13    posterior c5-t1    COLONOSCOPY  2009    COLONOSCOPY  12/29/2016    incomplete was not cleaned out    COLONOSCOPY  12/30/2016    COLONOSCOPY  04/25/2017     SIGMOID COLON POLYPECTOMY:  HYPERPLASTIC POLYP ,   DIVERTICULOSIS    CYSTORRHAPHY  04/04/2018    EYE SURGERY Bilateral 2017    CATARACTS W/ IOL    HARDWARE REMOVAL Right 07/05/2019    HARDWARE REMOVAL PINS  HUMERUS     HARDWARE REMOVAL Right 7/5/2019    HARDWARE REMOVAL PINS  HUMERUS  C-ARM performed by Jos Elizabeth MD at [address] [facility]  05/31/2013    Dr. Jack Huerta DX W/CELL WASHG [facility] N/A 6/5/2018    [facility] N/[facility] 4/25/2017    COLONOSCOPY POLYPECTOMY HOT BIOPSY performed by Den Mcneal MD at [facility] 4/4/2018    CYSTOSCOPY performed by Indu Nicolas MD at [address] Right 5/28/2019    SHOULDER CLOSED REDUCTION PERCUTANEOUS PINNING RIGHT performed by Jos Elizabeth [place] [address]  12/28/2016    gastritis, esophagitis,     UPPER GASTROINTESTINAL ENDOSCOPY N/A 8/29/2018    EGD BIOPSY performed by Den Mcneal MD at [address] [facility]       Medications Prior to Admission:    Medications Prior to Admission: Cholecalciferol (VITAMIN D3) 1.25 MG (58335 UT) CAPS, Take by mouth  QUEtiapine (SEROQUEL XR) 50 MG extended release tablet, Take 50 mg by mouth nightly  apixaban (ELIQUIS) 5 MG TABS tablet, Take 1 tablet by mouth 2 times daily (Patient taking differently: Take 2.5 mg by mouth 2 times daily )  metoprolol tartrate (LOPRESSOR) 25 MG tablet, Take 1 tablet by mouth daily (Patient taking differently: Take 12.5 mg by mouth 2 times daily )  Calcium-Vitamin D-Vitamin K 650-12.5-40 MG-MCG-MCG CHEW, Take 1 tablet by mouth daily as needed (low calcium intake)  Cyanocobalamin (B-12) 5000 MCG CAPS, Take 5,000 mcg by mouth daily  tiotropium (SPIRIVA) 18 MCG inhalation capsule, Inhale 18 mcg into the lungs daily  isosorbide mononitrate (IMDUR) 30 MG extended release tablet, Take 1 tablet by mouth daily  fluticasone-vilanterol (BREO ELLIPTA) 100-25 MCG/INH AEPB inhaler, Inhale 2 puffs into the lungs daily  albuterol (PROVENTIL) (2.5 MG/3ML) 0.083% nebulizer solution, Take 3 mLs by nebulization 4 times daily  miconazole (MICOTIN) 2 % powder, Apply topically 2 times daily.   melatonin 5 MG TABS tablet, Take 5 mg by mouth nightly as needed (sleep)  furosemide (LASIX) 20 MG tablet, Take 1 tablet by mouth daily  atorvastatin (LIPITOR) 20 MG tablet, TAKE 1 TABLET DAILY  carbidopa-levodopa (SINEMET CR)  MG per extended release tablet, Take 1 tablet by mouth 4 times daily  rOPINIRole (REQUIP) 2 MG tablet, Take 1 tablet by mouth 3 times daily  pantoprazole (PROTONIX) 40 MG tablet, TAKE 1 TABLET TWICE A DAY BEFORE MEALS (Patient taking differently: daily )  linagliptin (TRADJENTA) 5 MG tablet, Take 0.5 tablets by mouth daily (Patient taking differently: Take 5 mg by mouth daily )  amiodarone (CORDARONE) 200 MG tablet, Take 200 mg by mouth daily   magnesium oxide (MAG-OX) 400 (241.3 Mg) MG TABS tablet, Take 1 tablet by mouth 2 times daily (Patient taking differently: Take 400 mg by mouth daily )  senna (SENOKOT) 8.6 MG tablet, Take 1-2 tablets by mouth nightly   ferrous sulfate 325 (65 Fe) MG EC tablet, Take 1 tablet by mouth 2 times daily (with meals)  rasagiline mesylate 1 MG TABS, Take 1 tablet by mouth daily  calcitRIOL (ROCALTROL) 0.25 MCG capsule, Take 0.75 mcg by mouth daily   ONETOUCH VERIO strip, 1 each by In Vitro route daily As needed. ONE TOUCH ULTRA TEST strip, USE 1 STRIP THREE TIMES A DAY  Oxygen Concentrator, Dx: Pneumonia, use as directed. (Patient taking differently: Dx: Pneumonia, use as directed. ON 24/7)    Allergies:    Dye [barium-containing compounds]; Pcn [penicillins]; and Bactrim [sulfamethoxazole-trimethoprim]    Social History:    reports that she quit smoking about 50 years ago. Her smoking use included cigarettes. She has a 30.00 pack-year smoking history. She has never used smokeless tobacco. She reports previous alcohol use. She reports that she does not use drugs. Family History:   family history includes Heart Disease in her mother; Heart Failure in her mother; High Blood Pressure in her mother; Hypertension in her mother.     REVIEW OF SYSTEMS:  Constitutional: negative, No fever no chills  Eyes: negative  Ears, nose, mouth, throat, and face: negative  Respiratory: negative, Shortness of breath tachypnea no wheezing  Cardiovascular: negative, Chest pain no palpitation no dizziness  Gastrointestinal: negative  Genitourinary:negative  Integument/breast: negative  Hematologic/lymphatic: negative  Musculoskeletal:negative  Neurological: negative, No seizures no TIAs  Behavioral/Psych: negative  Endocrine: negative, No polyuria no polydipsia no hypoglycemia  Allergic/Immunologic: negative  PHYSICAL EXAM:  General Appearance: alert and oriented to person, place and time, well-developed and well-nourished, in no acute distress  Skin: warm and dry, no rash or erythema  Head: normocephalic and atraumatic  Eyes: pupils equal, round, and reactive to light, sclera anicteric and mild pallor  Neck: neck supple and non tender without mass, no thyromegaly or thyroid nodules, no cervical lymphadenopathy   Pulmonary/Chest: Entry equal bilateral rhonchi and expiratory wheezing decreased at the bases no use of intercostal muscles  Cardiovascular: normal rate, regular rhythm, normal S1 and S2, no gallops, intact distal pulses and no carotid bruits  Abdomen: soft, non-tender, non-distended, normal bowel sounds, no masses or organomegaly  Extremities: no edema and pulses no Homans' sign  Neurologic: Oriented x3 with no focal deficits    Vitals:  BP (!) 116/50   Pulse 70   Temp 98.2 °F (36.8 °C) (Oral)   Resp 20   Ht 4' 9\" (1.448 m)   Wt 170 lb (77.1 kg)   LMP 04/03/2004 (Within Years)   SpO2 95%   BMI 36.79 kg/m²     LABS:  CBC:   Lab Results   Component Value Date    WBC 6.5 10/23/2020    RBC 2.88 10/23/2020    HGB 9.2 10/23/2020    HCT 31.3 10/23/2020    .7 10/23/2020    MCH 31.9 10/23/2020    MCHC 29.4 10/23/2020    RDW 15.8 10/23/2020     10/23/2020    MPV 10.4 10/23/2020     CMP:    Lab Results   Component Value Date     10/22/2020    K 4.1 10/22/2020    CL 95 10/22/2020    CO2 34 10/22/2020    BUN 32 10/22/2020    CREATININE 2.02 10/22/2020    GFRAA 29 10/22/2020    LABGLOM 24 10/22/2020    GLUCOSE 170 10/22/2020    GLUCOSE 132 03/07/2012    PROT 6.0 09/23/2020    LABALBU 3.8 09/23/2020    LABALBU Detected 11/30/2018    CALCIUM 7.8 10/22/2020    BILITOT 0.18 09/23/2020    ALKPHOS 57 09/23/2020    AST 11 09/23/2020    ALT <5 09/23/2020         ASSESSMENT:    Copd exac    candida   acute on ch resp failure hypoxia hypercapnia  dm2 with renal complication  Macrocytic anemia  obesity BMI of 35-39.9  p atrial fibrillation  Chronic anticoagulation  Patient Active Problem List   Diagnosis    Controlled type 2 diabetes mellitus with stage 3 chronic kidney disease, without long-term current use of insulin (HCC)    Hyperlipidemia    Essential hypertension    Chronic leg pain    Paroxysmal atrial fibrillation (HCC)    Asthma    Gastroesophageal reflux disease without esophagitis    ECTOR on CPAP    Type 2 diabetes mellitus with complication, without long-term current use of insulin (HCC)    Spinal stenosis in cervical region    Hypomagnesemia    Hyperphosphaturia    Hypocalcemia    Parkinson's disease ([facility] [address]    Acute cystitis with hematuria    Altered mental status    Iron deficiency anemia due to chronic blood loss    Morbid obesity with BMI of 40.0-44.9, adult (HCC)    Hyperplastic polyp of intestine    Diverticulosis    Panlobular emphysema (HCC)    Rapid atrial fibrillation (HCC)    CKD (chronic kidney disease) stage 3, GFR 30-59 ml/min    Anemia in chronic kidney disease    Chronic respiratory failure with hypoxia and hypercapnia (HCC)    Acute kidney injury superimposed on chronic kidney disease (HCC)    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 coli UTI (urinary tract infection)    Nephrolithiasis    Candidal intertrigo    Venous insufficiency    Malabsorption    Anemia of chronic renal failure, stage 4 (severe) (HCC)    Iron deficiency    Coronary atherosclerosis    COPD exacerbation (HCC)    Restless leg syndrome    SIRS (systemic inflammatory response syndrome) (HCC)    Nausea and vomiting in adult    Bacterial UTI    Odynophagia    Esophageal dysphagia    Candida esophagitis (HCC)    Sepsis due to urinary tract infection (HCC)    Chronic respiratory failure with hypoxia (HCC)    Chronic diastolic congestive heart failure (HCC)    History of ESBL E. coli infection    Stage 4 chronic kidney disease (HCC)    Fever    Macrocytic anemia    Hypercalcemia    Monoclonal gammopathy of undetermined significance    Acute nonintractable headache    Anemia of chronic renal failure, stage 4 (severe) (HCC)    Traumatic closed displaced fracture of proximal end of right humerus, initial encounter    Urinary tract infection without hematuria    Metabolic encephalopathy    Chest pain    Acute kidney injury ([facility] [address]    Right leg swelling    Hematuria    Chest pain, unspecified    Chronic obstructive pulmonary disease with (acute) exacerbation (HCC)       PLAN:    Home meds reviewed restarted , ac hs accuchecks  Insulin coverage  antibiotics steroids bronchodilators BiPAP patient already on chronic anticoagulation  Slow hydration orders avoid nephrotoxic drugs      La Nena Saucedo MD  7:43 PM  10/23/2020

## 2020-10-23 NOTE — FLOWSHEET NOTE
10/22/20 3667   Provider Notification   Reason for Communication Review case   Provider Name Maddison Mckenzie   Provider Notification Physician   Method of Communication Secure Message   Response Other (Comment)  (new consult acknowledged)   Notification Time 0000

## 2020-10-23 NOTE — DISCHARGE INSTR - COC
Continuity of Care Form    Patient Name: Yumiko Schultz   :  1945  MRN:  [id number]    Admit date:  10/22/2020  Discharge date:  10/31/2020    Code Status Order: Full Code   Advance Directives:   [facility] Documentation       Date/Time Healthcare Directive Type of Healthcare Directive Copy in [address] Agent's Name Healthcare Agent's Phone Number    10/23/20 1122  Yes, patient has an advance directive for healthcare treatment  --  --  --  --  --            Admitting Physician:  Angel Denis MD  PCP: Angel Denis MD    Discharging Nurse: [facility] Unit/Room#: 1017/1017-02  Discharging Unit Phone Number: [phone]    Emergency Contact:   Extended Emergency Contact Information  Primary Emergency Contact: Sebas Banks  Address: [address] [place] [address] [place]  [address] Phone: [phone]  Mobile Phone: [phone]  Relation: Spouse   needed? No  Secondary Emergency Contact: Fernanda Chanel [facility] of [address] Phone: [phone]  Work Phone: [phone]  Mobile Phone: [phone]  Relation: Other   needed?  No    Past Surgical History:  Past Surgical History:   Procedure Laterality Date    APPENDECTOMY      CERVICAL [address] SURGERY  2012    CERVICAL SPINE SURGERY  13    posterior c5-t1    COLONOSCOPY      COLONOSCOPY  2016    incomplete was not cleaned out    COLONOSCOPY  2016    COLONOSCOPY  2017     SIGMOID COLON POLYPECTOMY:  HYPERPLASTIC POLYP ,   DIVERTICULOSIS    CYSTORRHAPHY  2018    CYSTOSCOPY N/A 10/28/2020    CYSTOSCOPY performed by Cinthia Louise MD at [address] [place] [address] 10/30/2020    CYSTOSCOPY PYELOGRAM WITH HOLMIUM LASER RIGHT STENT INSERTION performed by Cinthia Louise MD at [address] [facility] Bilateral 2017    CATARACTS W/ IOL    HARDWARE REMOVAL Right 2019    HARDWARE REMOVAL PINS  HUMERUS current use of insulin ([facility] [address] E11.8    Spinal stenosis in cervical region M48.02    Hypomagnesemia E83.42    Hyperphosphaturia E83.39    Hypocalcemia E83.51    Parkinson's disease ([facility] [address] G20    Acute cystitis with hematuria N30.01    Altered mental status R41.82    Iron deficiency anemia due to chronic blood loss D50.0    Morbid obesity with BMI of 40.0-44.9, adult ([facility] E66.01, Z68.41    Hyperplastic polyp of intestine K63.5    Diverticulosis K57.90    Panlobular emphysema ([facility] J43.1    Rapid atrial fibrillation ([facility] I48.91    CKD (chronic kidney disease) stage 3, GFR 30-59 ml/min N18.30    Anemia in chronic kidney disease N18.9, D63.1    Chronic respiratory failure with hypoxia and hypercapnia ([facility] J96.11, J96.12    Acute kidney injury superimposed on chronic kidney disease ([facility] N17.9, N18.9    CKD stage 4 due to type 2 diabetes mellitus ([facility]) E11.22, N18.4    E. coli UTI (urinary tract infection) N39.0, B96.20    Nephrolithiasis N20.0    Candidal intertrigo B37.2    Venous insufficiency I87.2    Malabsorption K90.9    Anemia of chronic renal failure, stage 4 (severe) ([facility] N18.4, D63.1    Iron deficiency E61.1    Coronary atherosclerosis I25.10    COPD exacerbation ([facility] J44.1    Restless leg syndrome G25.81    SIRS (systemic inflammatory response syndrome) ([facility] R65.10    Nausea and vomiting in adult R11.2    Bacterial UTI N39.0, A49.9    Odynophagia R13.10    Esophageal dysphagia R13.10    Candida esophagitis ([facility] B37.81    Sepsis due to urinary tract infection ([facility]) A41.9, N39.0    Chronic respiratory failure with hypoxia ([facility] J96.11    Chronic diastolic congestive heart failure ([facility] I50.32    History of ESBL E. coli infection Z86.19    Stage 4 chronic kidney disease ([facility] N18.4    Fever R50.9    Macrocytic anemia D53.9    Hypercalcemia E83.52    Monoclonal gammopathy of undetermined significance D47.2    Acute nonintractable headache R51.9    Anemia of chronic renal failure, stage 4 (severe) ([facility] N18.4, D63.1    Traumatic closed displaced fracture of proximal end of right humerus, initial encounter S42.201A    Urinary tract infection without hematuria I28.2    Metabolic encephalopathy Q74.85    Chest pain R07.9    Acute kidney injury ([address] N17.9    Right leg swelling M79.89    Hematuria R31.9    Chest pain, unspecified R07.9    Chronic obstructive pulmonary disease with (acute) exacerbation ([facility]) J44.1       Isolation/Infection:   Isolation            Contact          Patient Infection Status       Infection Onset Added Last Indicated Last Indicated By Review Planned Expiration Resolved Resolved By    ESBL (Extended Spectrum Beta Lactamase)  03/10/18 10/25/19 Urine culture via catheter        E.  Coli - urine 12/2019      Resolved    COVID-19 Rule Out 10/22/20 10/22/20 10/22/20 COVID-19 (Ordered)   10/22/20 Rule-Out Test Resulted    COVID-19 Rule Out 08/14/20 08/14/20 08/15/20 Covid-19 Ambulatory (Ordered)   08/17/20 Rule-Out Test Resulted            Nurse Assessment:  Last Vital Signs: BP (!) 118/46   Pulse 74   Temp 98.2 °F (36.8 °C) (Oral)   Resp 20   Ht 4' 9\" (1.448 m)   Wt 170 lb (77.1 kg)   LMP 04/03/2004 (Within Years)   SpO2 97%   BMI 36.79 kg/m²     Last documented pain score (0-10 scale): Pain Level: 0  Last Weight:   Wt Readings from Last 1 Encounters:   10/23/20 170 lb (77.1 kg)     Mental Status:  oriented and alert    IV Access:  - None    Nursing Mobility/ADLs:  Walking   Assisted  Transfer  Assisted  Bathing  Assisted  Dressing  Assisted  Toileting  Assisted  Feeding  Independent  Med Admin  Independent  Med Delivery   whole    Wound Care Documentation and Therapy:  Wound 05/30/19 Coccyx Upper small \"slit\" toward bottom of coccyx/present on admission (Active)   Number of days: 512       Wound 06/05/19 Heel Right (Active)   Number of days: 505       Wound 06/05/19 Thigh Medial;Left (Active)   Number of days: 609 Elimination:  Continence:   · Bowel: No  · Bladder: No  Urinary Catheter: Insertion Date: 10/30/2020 and Indication for Use of Catheter: Urology/Urologist seeing this patient or inserted indwelling catheter  Colostomy/Ileostomy/Ileal Conduit: No       Date of Last BM: 10/22/20  No intake or output data in the 24 hours ending 10/23/20 1524  No intake/output data recorded. Safety Concerns: At Risk for Falls    Impairments/Disabilities:      Speech and Hearing    Nutrition Therapy:  Current Nutrition Therapy:   - Oral Diet:  Carb Control 4 carbs/meal (1800kcals/day), Low Sodium (3-4gm) and No Added Salt    Routes of Feeding: Oral  Liquids: No Restrictions  Daily Fluid Restriction: yes - amount 1800  Last Modified Barium Swallow with Video (Video Swallowing Test): not done    Treatments at the Time of Hospital Discharge:   Respiratory Treatments: see MAR  Oxygen Therapy:  is on oxygen at 2 L/min per nasal cannula.   Ventilator:    - No ventilator support  - CPAP   only when sleeping    Rehab Therapies: Physical Therapy, Speech Therapy, & Occupational Therapy  Weight Bearing Status/Restrictions: No weight bearing restirctions  Other Medical Equipment (for information only, NOT a DME order):  walker  Other Treatments:   Skilled RN Assessment  Medication Teaching and Compliance  [address] for personal care    Patient's personal belongings (please select all that are sent with patient):  Glasses, Dentures upper and lower, Clothing, Ipad    RN SIGNATURE:  Electronically signed by Kevin Gonzalez RN on 10/31/20 at 4:50 PM EDT    CASE MANAGEMENT/SOCIAL WORK SECTION    Inpatient Status Date: ***    Readmission Risk Assessment Score:  Readmission Risk              Risk of Unplanned Readmission:        35           Discharging to Facility/ Agency   · Name: [facility]    · Address:  · Phone: [phone] · [address] (if applicable)   · Name:  · Address:  · Dialysis

## 2020-10-23 NOTE — FLOWSHEET NOTE
10/23/20 0003   Provider Notification   Reason for Communication New orders   Provider Name Dr. Lynda Gardiner   Provider Notification Physician   Method of Communication Page   Notification Time 0076

## 2020-10-23 NOTE — PLAN OF CARE
Problem: Skin Integrity:  Goal: Will show no infection signs and symptoms  Description: Will show no infection signs and symptoms  Outcome: Ongoing  Goal: Absence of new skin breakdown  Description: Absence of new skin breakdown  Outcome: Ongoing     Problem: Falls - Risk of:  Goal: Will remain free from falls  Description: Will remain free from falls  Outcome: Ongoing  Note: Pt fall risk, fall band present, falling star, safety alarm activated and in use as needed. Hourly rounding performed. Pt encouraged to use call light. See Steve Fraction fall risk assessment. Will continue to monitor patient closely.   Goal: Absence of physical injury  Description: Absence of physical injury  Outcome: Ongoing

## 2020-10-23 NOTE — PROGRESS NOTES
Comprehensive Nutrition Assessment    Type and Reason for Visit:  Positive Nutrition Screen(Chewing and swallowing difficuty, home tube feeding, diet education)    Nutrition Recommendations/Plan:   1. Continue Carb Control, No added salt and 1800 ml fluid restriction. 2. Change diet consistency to minced and moist.   3. Monitor chewing and swallowing, consider Swallow Eval if swallowing difficulty continues. Nutrition Assessment:  Patient admitted with COPD exacerbation. Patient reports she has Parkinson's, her food has to be cut up. Sometimes food gets stuck in her throat, receiving home Speech Therapy 2x a week. Patient said she likes to eat, there is no home tube feeding. Will change diet consistency to Dysphagia soft bite sized for easy chewing and swallowing. Malnutrition Assessment:  Malnutrition Status: At risk for malnutrition (Comment)    Context:  Chronic Illness     Findings of the 6 clinical characteristics of malnutrition:  Energy Intake:  Mild decrease in energy intake (Comment)  Weight Loss:  No significant weight loss     Body Fat Loss:  No significant body fat loss     Muscle Mass Loss:  No significant muscle mass loss    Fluid Accumulation:  No significant fluid accumulation     Strength:  Not Performed    Estimated Daily Nutrient Needs:  Energy (kcal):  1495 kcal based on [place]- St. Jeor using 1.3 factor (Parkinson's); Weight Used for Energy Requirements:  Current     Protein (g):  55-63 gm 1.4-1.6 gm/kg of ideal (CKD- IV); Weight Used for Protein Requirements:  Ideal          Nutrition Related Findings:  No edema. Bowel sounds active      Wounds:  None       Current Nutrition Therapies:    DIET CARB CONTROL; Carb Control: 4 carb choices (60 gms)/meal; No Added Salt (3-4 GM);  Dysphagia Soft and Bite-Sized; Daily Fluid Restriction: 1800 ml    Anthropometric Measures:  · Height: 4' 9\" (144.8 cm)  · Current Body Weight: 170 lb (77.1 kg)   · Admission Body Weight: 170 lb (77.1 kg) · Usual Body Weight: 170 lb (77.1 kg)(per patient)     · Ideal Body Weight: 85 lbs; % Ideal Body Weight 200 %   · BMI: 36.8  · BMI Categories: Obese Class 2 (BMI 35.0 -39.9)       Nutrition Diagnosis:   · Inadequate oral intake related to swallowing difficulty, cognitive or neurological impairment, biting/chewing (masticatory) difficulty as evidenced by intake 51-75%    Nutrition Interventions:   Food and/or Nutrient Delivery:  Modify Current Diet  Nutrition Education/Counseling:  Education initiated   Coordination of Nutrition Care:  Continued Inpatient Monitoring    Goals:  meet greater than 75% of estimated nutrition needs       Nutrition Monitoring and Evaluation:   Food/Nutrient Intake Outcomes:  Food and Nutrient Intake  Physical Signs/Symptoms Outcomes:  Biochemical Data, Chewing or Swallowing     Discharge Planning:    Continue current diet       Some areas of assessment may be incomplete due to COVID-19 precautions    Gaurav Muse RD, LD  Office phone [phone]

## 2020-10-23 NOTE — CONSULTS
Pulmonary Medicine and [facility] APRN-CNP/Molina Perez MD      Patient - Parker Elizabeth   MRN -  [id number]   Pattie # - [de-identified]   - 1945      Date of Admission -  10/22/2020  4:18 PM  Date of evaluation -  10/23/2020  Room - [address] [place] [address] Day - 1  Consulting - Kendy Sue MD Primary Care Physician - Kendy Sue MD     Reason for Consult    Acute on chronic hypoxic/hypercarbic respiratory failure/COPD exacerbation    Assessment   · Acute on chronic hypoxic/hypercarbic respiratory failure  · Acute exacerbation of COPD/former smoker, 30-pack-year history  · Chronic diastolic heart failure  · Obstructive sleep apnea/Obesity, noncompliant on CPAP  · KRISTIN on CKD  · Elevated troponin  · A. fib, anxiety, DM, HLD, HTN,  GERD    Recommendations   · Continue IV Zithromax empirically  · IV solu-medrol 40 mg every 8 hours  · Albuterol Q 4 hours and prn  · Symbicort 160  · Spiriva Respimat  · X-ray chest in am  · Labs: CBC and BMP in am  · BiPAP while asleep and as needed during the day  · 2 liters/min via nasal cannula  · DVT prophylaxis, on Eliquis  · She has repeat sleep study scheduled as outpatient for next week. · Discussed with patient and   · Above assessment and plan reviewed with Dr. Ronnie Rosenbaum. Final plan when Dr. Ronnie Rosenbaum rounds.   · Will follow with you    Problem List      Patient Active Problem List   Diagnosis    Controlled type 2 diabetes mellitus with stage 3 chronic kidney disease, without long-term current use of insulin (HCC)    Hyperlipidemia    Essential hypertension    Chronic leg pain    Paroxysmal atrial fibrillation (HCC)    Asthma    Gastroesophageal reflux disease without esophagitis    ECTOR on CPAP    Type 2 diabetes mellitus with complication, without long-term current use of insulin ([address]    Spinal stenosis in cervical region    Hypomagnesemia    Hyperphosphaturia    Hypocalcemia    Parkinson's disease ([address]    Acute cystitis with hematuria    Altered mental status    Iron deficiency anemia due to chronic blood loss    Morbid obesity with BMI of 40.0-44.9, adult (HCC)    Hyperplastic polyp of intestine    Diverticulosis    Panlobular emphysema (HCC)    Rapid atrial fibrillation (HCC)    CKD (chronic kidney disease) stage 3, GFR 30-59 ml/min    Anemia in chronic kidney disease    Chronic respiratory failure with hypoxia and hypercapnia (HCC)    Acute kidney injury superimposed on chronic kidney disease (HCC)    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 coli UTI (urinary tract infection)    Nephrolithiasis    Candidal intertrigo    Venous insufficiency    Malabsorption    Anemia of chronic renal failure, stage 4 (severe) (HCC)    Iron deficiency    Coronary atherosclerosis    COPD exacerbation (HCC)    Restless leg syndrome    SIRS (systemic inflammatory response syndrome) (HCC)    Nausea and vomiting in adult    Bacterial UTI    Odynophagia    Esophageal dysphagia    Candida esophagitis (HCC)    Sepsis due to urinary tract infection (HCC)    Chronic respiratory failure with hypoxia (HCC)    Chronic diastolic congestive heart failure (HCC)    History of ESBL E. coli infection    Stage 4 chronic kidney disease (HCC)    Fever    Macrocytic anemia    Hypercalcemia    Monoclonal gammopathy of undetermined significance    Acute nonintractable headache    Anemia of chronic renal failure, stage 4 (severe) (HCC)    Traumatic closed displaced fracture of proximal end of right humerus, initial encounter    Urinary tract infection without hematuria    Metabolic encephalopathy    Chest pain    Acute kidney injury ([address]    Right leg swelling    Hematuria    Chest pain, unspecified    Chronic obstructive pulmonary disease with (acute) exacerbation ([address]       ELIZABETH Coleman is 70 y.o.,  female, admitted because of acute exacerbation of COPD.   She presented to the ED yesterday with difficulty breathing about 1 hour prior to coming in. She was noted to be hypoxic by EMS and placed on CPAP and brought to ER. She denies any fever, chills or cough. She denies any chest pain. At this time she is feeling somewhat better than yesterday. She is up in the chair,  at bedside in no distress. At home she takes [place] as well as albuterol via nebulizer. She is on oxygen at 2 L via nasal cannula 24/7. She is also on CPAP therapy however is not always compliant with wearing her machine. PMHx   Past Medical History      Diagnosis Date    Acute on chronic diastolic congestive heart failure ([facility]     Anesthesia complication     DIFFICULTY WAKING OP    Asthma     Atrial fibrillation ([facility] 2013    Cataracts, bilateral     Cellulitis     BILAT LOWER LEGS-PT STATES IS IMPROVING    Cerebral artery occlusion with cerebral infarction [facility])     Last stroke was February 2017 w/no deficits-TOTAL OF 3    CHF (congestive heart failure) (HCC)     Chronic kidney disease 2013    NOT ON DIALYSIS YET    Chronic kidney disease     Closed fracture of proximal end of right humerus with routine healing     Constipation     CHRONIC    Controlled type 2 diabetes mellitus with stage 3 chronic kidney disease, without long-term current use of insulin (HCC)     COPD (chronic obstructive pulmonary disease) ([address] 2008    PT. SEES DR. BECK. Home O2 at 2-3L/NC 24/7.     Difficult intravenous access     VEINS ROLL    Difficulty walking     AMBULATES WITH THERAPPY    Diverticulosis     DM2 (diabetes mellitus, type 2) ([facility] 2008    Full dentures     FULL UPPER ONLY    GERD (gastroesophageal reflux disease) 2008    ON RX    H/O fall     Headache(784.0)     [place]     HAS HEARING AIDS BUT DOES NOT WEAR    Hyperlipidemia     Hyperplastic polyp of intestine     Hypertension 2008    Impaired ambulation     USES TRANFER CHAIR WALKER OR CANE    Kidney stone 2018 PRESENTLY-SMALL    Living in nursing home     [address] [facility] IS HER OWN LEGAL GUARDIAN    Morbid obesity with BMI of 40.0-44.9, adult ([facility] 12/30/2016    Muscle weakness     Nephrolithiasis 3/8/2018    Open wound     COCCYX    ECTOR on CPAP 2008    USES C-PAP NIGHTLY    Osteoarthritis     OSTEOARTHRITIS    Parkinson disease ([facility] 2015    Restless leg syndrome 2013    MILD    Spinal stenosis in cervical region 6/2/2013    Type II or unspecified type diabetes mellitus without mention of complication, not stated as uncontrolled     Urethral caruncle 3/8/2018    Wears glasses     Wears glasses       Past Surgical History        Procedure Laterality Date    APPENDECTOMY      CERVICAL [address] SURGERY  2012    CERVICAL SPINE SURGERY  5/31/13    posterior c5-t1    COLONOSCOPY  2009    COLONOSCOPY  12/29/2016    incomplete was not cleaned out    COLONOSCOPY  12/30/2016    COLONOSCOPY  04/25/2017     SIGMOID COLON POLYPECTOMY:  HYPERPLASTIC POLYP ,   DIVERTICULOSIS    CYSTORRHAPHY  04/04/2018    EYE SURGERY Bilateral 2017    CATARACTS W/ IOL    HARDWARE REMOVAL Right 07/05/2019    HARDWARE REMOVAL PINS  HUMERUS     HARDWARE REMOVAL Right 7/5/2019    HARDWARE REMOVAL PINS  HUMERUS  C-ARM performed by Saintclair Darner, MD at [address] [facility]  05/31/2013    Dr. Gill President DX W/CELL WASHG [facility] N/A 6/5/2018    [facility] N/A 4/25/2017    COLONOSCOPY POLYPECTOMY HOT BIOPSY performed by Qi Bennett MD at [address] [facility] 4/4/2018    CYSTOSCOPY performed by Patrick Parry MD at [address] Right 5/28/2019    SHOULDER CLOSED REDUCTION PERCUTANEOUS PINNING RIGHT [facility] 12/28/2016    gastritis, esophagitis,     UPPER GASTROINTESTINAL ENDOSCOPY N/A 8/29/2018    EGD BIOPSY performed by Maame Torres MD at [address] [facility]    Current Medications    albuterol  2.5 mg Nebulization 4x daily    amiodarone  200 mg Oral Daily    apixaban  5 mg Oral BID    atorvastatin  1 tablet Oral Daily    calcitRIOL  0.75 mcg Oral Daily    carbidopa-levodopa  1 tablet Oral 4x Daily    budesonide-formoterol  2 puff Inhalation BID    furosemide  20 mg Oral Daily    isosorbide mononitrate  30 mg Oral Daily    metoprolol tartrate  25 mg Oral Daily    pantoprazole  40 mg Oral QAM AC    rasagiline  1 mg Oral Daily    rOPINIRole  2 mg Oral TID    tiotropium  2 puff Inhalation Daily    sodium chloride flush  10 mL Intravenous 2 times per day    azithromycin  500 mg Intravenous Q24H    insulin lispro  0-6 Units Subcutaneous TID WC    insulin lispro  0-3 Units Subcutaneous Nightly     melatonin, albuterol, sodium chloride flush, acetaminophen **OR** acetaminophen, polyethylene glycol, promethazine **OR** ondansetron, glucose, dextrose, glucagon (rDNA), dextrose  IV Drips/Infusions   dextrose       Home Medications  Medications Prior to Admission: QUEtiapine (SEROQUEL XR) 50 MG extended release tablet, Take 50 mg by mouth nightly  apixaban (ELIQUIS) 5 MG TABS tablet, Take 1 tablet by mouth 2 times daily  metoprolol tartrate (LOPRESSOR) 25 MG tablet, Take 1 tablet by mouth daily  Calcium-Vitamin D-Vitamin K 650-12.5-40 MG-MCG-MCG CHEW, Take 1 tablet by mouth daily as needed (low calcium intake)  Cyanocobalamin (B-12) 5000 MCG CAPS, Take 5,000 mcg by mouth daily  tiotropium (SPIRIVA) 18 MCG inhalation capsule, Inhale 18 mcg into the lungs daily  isosorbide mononitrate (IMDUR) 30 MG extended release tablet, Take 1 tablet by mouth daily  fluticasone-vilanterol (BREO ELLIPTA) 100-25 MCG/INH AEPB inhaler, Inhale 2 puffs into the lungs daily  albuterol (PROVENTIL) (2.5 MG/3ML) 0.083% nebulizer solution, Take 3 mLs by nebulization 4 times daily  miconazole (MICOTIN) 2 % powder, Apply topically 2 times daily. melatonin 5 MG TABS tablet, Take 5 mg by mouth nightly as needed (sleep)  calcitRIOL (ROCALTROL) 0.25 MCG capsule, Take 0.75 mcg by mouth daily   furosemide (LASIX) 20 MG tablet, Take 1 tablet by mouth daily  atorvastatin (LIPITOR) 20 MG tablet, TAKE 1 TABLET DAILY  carbidopa-levodopa (SINEMET CR)  MG per extended release tablet, Take 1 tablet by mouth 4 times daily  rOPINIRole (REQUIP) 2 MG tablet, Take 1 tablet by mouth 3 times daily  ONETOUCH VERIO strip, 1 each by In Vitro route daily As needed. pantoprazole (PROTONIX) 40 MG tablet, TAKE 1 TABLET TWICE A DAY BEFORE MEALS  linagliptin (TRADJENTA) 5 MG tablet, Take 0.5 tablets by mouth daily (Patient taking differently: Take 5 mg by mouth daily )  ONE TOUCH ULTRA TEST strip, USE 1 STRIP THREE TIMES A DAY  amiodarone (CORDARONE) 200 MG tablet, Take 200 mg by mouth daily   magnesium oxide (MAG-OX) 400 (241.3 Mg) MG TABS tablet, Take 1 tablet by mouth 2 times daily (Patient taking differently: Take 400 mg by mouth daily )  senna (SENOKOT) 8.6 MG tablet, Take 1-2 tablets by mouth nightly   ferrous sulfate 325 (65 Fe) MG EC tablet, Take 1 tablet by mouth 2 times daily (with meals)  rasagiline mesylate 1 MG TABS, Take 1 tablet by mouth daily  Oxygen Concentrator, Dx: Pneumonia, use as directed. (Patient taking differently: Dx: Pneumonia, use as directed.    ON )    Allergies    Dye [barium-containing compounds]; Pcn [penicillins]; and Bactrim [sulfamethoxazole-trimethoprim]  Social History     Social History     Tobacco Use    Smoking status: Former Smoker     Packs/day: 2.00     Years: 15.00     Pack years: 30.00     Types: Cigarettes     Last attempt to quit: 10/31/1970     Years since quittin.0    Smokeless tobacco: Never Used   Substance Use Topics    Alcohol use: Not Currently     Family History          Problem Relation Age of 31.3 10/23/2020     10/23/2020    .7 10/23/2020    MCH 31.9 10/23/2020    MCHC 29.4 10/23/2020    RDW 15.8 10/23/2020    NRBC 1 06/11/2018    METASPCT 1 06/10/2018    LYMPHOPCT 18 10/22/2020    MONOPCT 10 10/22/2020    BASOPCT 0 10/22/2020    MONOSABS 0.86 10/22/2020    LYMPHSABS 1.57 10/22/2020    EOSABS 0.11 10/22/2020    BASOSABS 0.03 10/22/2020    DIFFTYPE NOT REPORTED 10/22/2020     BMP   Lab Results   Component Value Date     10/22/2020    K 4.1 10/22/2020    CL 95 10/22/2020    CO2 34 10/22/2020    BUN 32 10/22/2020    CREATININE 2.02 10/22/2020    GLUCOSE 170 10/22/2020    GLUCOSE 132 03/07/2012    CALCIUM 7.8 10/22/2020    MG 1.4 02/28/2020     LFTS  Lab Results   Component Value Date    ALKPHOS 57 09/23/2020    ALT <5 09/23/2020    AST 11 09/23/2020    PROT 6.0 09/23/2020    BILITOT 0.18 09/23/2020    BILIDIR <0.08 09/01/2019    IBILI 0.38 06/04/2019    LABALBU 3.8 09/23/2020    LABALBU Detected 11/30/2018       Radiology    CXR  10/22/2020      (See actual reports for details)    \"Thank you for asking us to see this patient\"    Case discussed with nurse and patient/. Questions and concerns addressed.     Electronically signed by     ANSHUL Santana-CNP  Pulmonary Critical Care and Sleep Medicine,  Patient seen under the supervision of Lise Kaufman MD, [facility]

## 2020-10-23 NOTE — CARE COORDINATION
Case Management Initial Discharge Plan  Kellen Corona,         Readmission Risk              Risk of Unplanned Readmission:        32             Met with:patient to discuss discharge plans. Information verified: address, contacts, phone number, , insurance Yes  PCP: Jazz Duckworth MD  Date of last visit: few weeks    Insurance Provider: medicare     Discharge Planning  Current Residence:  Private home  Living Arrangements:  Spouse/Significant Other       Home has 1 stories/1 stairs to climb  Support Systems:  Home Care Staff       Current Services PTA:    1. Home care for ST thru [facility]   2. Home 02 3 liters all the time thru [facility]     Patient able to perform ADL's:Assisted  DME in home:  2ww, w/c, cane, elevated toilet seats, grab bars, home 02 24/7 3 liters, cpap, neb  DME used to aid ambulation prior to admission:   Fatimah Budd   DME used during admission:  Fatimah Budd,      Potential Assistance Needed:  N/A    Pharmacy: [facility]    Potential Assistance Purchasing Medications:  No  Does patient want to participate in local refill/ meds to beds program?  No    Patient agreeable to home care: Yes  Freedom of choice provided:  yes     The Plan for Transition of Care is related to the following treatment goals: skilled rn therapy     The Patient and/or patient representative   was provided with a choice of provider and agrees   with the discharge plan. [x] Yes [] No    Freedom of choice list was provided with basic dialogue that supports the patient's individualized plan of care/goals, treatment preferences and shares the quality data associated with the providers.  [x] Yes [] No      Type of Home Care Services:  None  Patient expects to be discharged to:  home    Prior SNF/Rehab Placement and Facility: [facility]   Agreeable to SNF/Rehab: No  [place] of choice provided: n/a   Evaluation: n/a    Expected Discharge date:  10/26/20  Follow Up Appointment: Sneha Barajas Day/ Time: Tuesday AM    Transportation provider: per family  Transportation arrangements needed for discharge: No    Discharge Plan:   Patient lives with spouse and daughter Nhi. Daughter assists with all the home ADL's and spouse with the personal ADL's. [facility] is current [facility] for home 02 at 3 liters 24/7, neb and cpap. She states she is current with [facility] for speech therapy and would like to continue. . did discuss other services and she would also like to have PT, OT, RN and HHA for adl's assistance . Epic referral sent and ROLANDA initiated.      Electronically signed by Fercho Enciso RN on 10/23/20 at 9:31 AM EDT

## 2020-10-24 NOTE — PROGRESS NOTES
CKD  · Elevated troponin  · A. fib, anxiety, DM, HLD, HTN,  GERD    Recommendations:  · Continue IV Zithromax empirically  · IV solu-medrol 40 mg every 8 hours  · Albuterol Q 4 hours and prn  · Symbicort 160  · Spiriva Respimat  · IV fluids per primary, monitor renal function  · Labs: CBC and BMP in am  · BiPAP while asleep and as necessary during the day  · Oxygen via nasal cannula, keep SPO2 greater than 92%   · DVT prophylaxis, on Eliquis   · PT/OT  · Discussed with RN  · Above assessment and plan will be reviewed with Dr. Yehuda Thorne. Final plan when Dr. Yehuda Thorne rounds.   · Will follow with you    ANSHUL Neville-CNP   Pulmonary Critical Care and Sleep Medicine,  Patient seen under the supervision of Sayra Thompson MD, [place]

## 2020-10-24 NOTE — PROGRESS NOTES
Occupational Therapy   Occupational Therapy Initial Assessment  Date: 10/24/2020   Patient Name: Marc Foster  MRN: [id number]     : 1945    Date of Service: 10/24/2020    Discharge Recommendations:  24 hour supervision or assist, Home with Home health OT      RN reports patient is medically stable for therapy treatment this date. Chart reviewed prior to treatment and patient is agreeable for therapy. All lines intact and patient positioned comfortably at end of treatment. All patient needs addressed prior to ending therapy session. Marc Foster is a 70 y.o. female who presents to the emergency department for difficulty breathing. This started 1 hour before coming in. She was noted to have a low O2 sat and was placed on CPAP by the paramedics and transported here. She has not had a known fever and has not had a cough. No vomiting. She has a history of COPD and CHF. Symptom has been continuous and she felt better with the CPAP.       Assessment   Performance deficits / Impairments: Decreased functional mobility ; Decreased balance;Decreased ADL status; Decreased high-level IADLs;Decreased ROM; Decreased strength;Decreased sensation  Assessment: Pt would benefit from skilled OT while IP with continued [place] OT at d/c. At baseline, pt requires 24 hr s/a d/t multiple dx affecting balance and activity tolerance  Prognosis: Fair  Decision Making: High Complexity  OT Education: OT Role;Energy Conservation;Plan of Care;Transfer Training  REQUIRES OT FOLLOW UP: Yes  Activity Tolerance  Activity Tolerance: Patient limited by fatigue;Patient Tolerated treatment well;Treatment limited secondary to medical complications (free text)  Activity Tolerance: Pt is motivated but has limited activity tolerance  Safety Devices  Safety Devices in place: Yes  Type of devices: Nurse notified; Left in chair;Call light within reach; Chair alarm in place;Gait belt           Patient Diagnosis(es): The encounter diagnosis was 8/29/2018); shoulder fracture surgery (Right, 5/28/2019); Hardware Removal (Right, 07/05/2019); and Hardware Removal (Right, 7/5/2019). Restrictions  Restrictions/Precautions  Restrictions/Precautions: General Precautions, Fall Risk  Required Braces or Orthoses?: No  Position Activity Restriction  Other position/activity restrictions: Up with assist    Subjective   General  Patient assessed for rehabilitation services?: Yes  Additional Pertinent Hx: h/o PD  Family / Caregiver Present: Yes()  Patient Currently in Pain: Yes  Pain Assessment  Pain Assessment: 0-10  Pain Level: 7  Pain Type: Chronic pain  Pain Location: Generalized  Vital Signs  Patient Currently in Pain: Yes  Social/Functional History  Social/Functional History  Lives With: Spouse  Type of Home: House  Home Layout: One level  Home Access: Stairs to enter with rails  Entrance Stairs - Number of Steps: 1  Bathroom Shower/Tub: Tub/Shower unit  Bathroom Toilet: Standard(Has bidet)  Home Equipment: Rolling walker, [employer]  Receives Help From: Family  ADL Assistance: Needs assistance(Needs help with bathing, but can dress indp)  Homemaking Assistance: Needs assistance(Dtr does laundry, planning to get )  Ambulation Assistance: Needs assistance(Always has someone with her)  Transfer Assistance: Independent  Active : No  Occupation: Retired  Leisure & Hobbies: Kacey, [employer]  IADL Comments: Pt's ability to participate in IADLs is affected by fatigue from COPD and PD  Additional Comments: Dtr assists, but has a learning disability. Pt recognizes that she is at great risk of falls and takes precautions to prevent.        Objective        Orientation  Overall Orientation Status: Within Functional Limits  Observation/Palpation  Posture: Fair(Trunk flexed posture in standing)  Observation: Pt has short stature with high BMI  Balance  Sitting Balance: Stand by assistance(In chair with arms)  Standing Balance: Minimal assistance(x2 for safety)  Standing Balance  Time: Pt demo'd fair (-) balance during functional mob  Functional Mobility  Functional - Mobility Device: Rolling Walker  Activity: Other  Assist Level: Moderate assistance(Min-Mod Ax2 for safety d/t decreased balance and multiple tubes/wires)  Functional Mobility Comments: Pt completed functional mob in room from bedside chair to door x2. Pt reports that it feels good to be up but she fatigues very quickly  ADL  Feeding: Independent;Setup  Grooming: Stand by assistance;Setup  UE Bathing: Minimal assistance  LE Bathing: Moderate assistance  UE Dressing: Minimal assistance  LE Dressing: Moderate assistance  Toileting: Minimal assistance  Additional Comments: Pt is limited by decreased activity tolerance and poor balance  Tone RUE  RUE Tone: Normotonic  Tone LUE  LUE Tone: Normotonic  Coordination  Movements Are Fluid And Coordinated: Yes     Bed mobility  Comment: NT- pt in chair upon arrival and returned to chair  Transfers  Sit to stand:  Moderate assistance;2 Person assistance  Stand to sit: Moderate assistance;2 Person assistance  Transfer Comments: Pt has participated in therapy in the past and demo'd good transfer technique     Cognition  Overall Cognitive Status: [facility]  Cognition Comment: Pt has good awareness of her deficits  Perception  Overall Perceptual Status: [facility]     Sensation  Overall Sensation Status: Impaired(Paresthesia B UE's/LE's)        LUE AROM (degrees)  LUE AROM : WF  RUE AROM (degrees)  RUE AROM : Exceptions  RUE General AROM: Pt is able to flex/abd to ~80, full PROM  LUE Strength  Gross LUE Strength: WFL  L Hand General: 4-/5  LUE Strength Comment: 4-/5 grossly BUEs  RUE Strength  Gross RUE Strength: [facility]  R Hand General: 4-/5                   Plan   Plan  Times per week: 4-5x/week  Times per day: Daily  Current Treatment Recommendations: Strengthening, Endurance Training, Self-Care / ADL, Safety Education & Training, Functional Mobility Training, Balance Training    G-Code             Goals  Short term goals  Time Frame for Short term goals: By d/c, pt will  Short term goal 1: demo Min A for ADL transfers with good technique  Short term goal 2: demo Mod A x1 functional mob at room distance with good safety/pacing and no LOB with RW for support  Short term goal 3: demo Min A for full body bathing/dressing with use of AD/DME as needed  Short term goal 4: demo and verb good understanding of all educ provided on fall prevention, EC/WS, possible equip needs and UB HEP  Patient Goals   Patient goals : Return home and resume therapy with [facility]  Living       Therapy Time   Individual Concurrent Group Co-treatment   Time In 1320(plus 10 min chart review)         Time Out 1400         Minutes 40             Pt would benefit from skilled home OT services in order to maximize occupational performance, safety, and independence in the home environment.           Malka Pineda, OT

## 2020-10-24 NOTE — PROGRESS NOTES
Physical Therapy    Facility/Department: [facility] PROGRESSIVE CARE  Initial Assessment    NAME: Amanda Mealra  : 1945  MRN: [id number]    Date of Service: 10/24/2020    Discharge Recommendations:  Home with Home health PT, 24 hour supervision or assist        Assessment   Body structures, Functions, Activity limitations: Decreased strength;Decreased endurance;Decreased balance  Prognosis: Good  Decision Making: High Complexity  PT Education: General Safety;PT Role;Plan of Care  Patient Education: LE HEP handout  REQUIRES PT FOLLOW UP: Yes  Activity Tolerance  Activity Tolerance: Patient limited by endurance; Patient limited by pain       Patient Diagnosis(es): The encounter diagnosis was COPD exacerbation ([address]      has a past medical history of Acute on chronic diastolic congestive heart failure ([address] Anesthesia complication, Asthma, Atrial fibrillation ([address] Cataracts, bilateral, Cellulitis, Cerebral artery occlusion with cerebral infarction ([address] CHF (congestive heart failure) ([address] Chronic kidney disease, Chronic kidney disease, Closed fracture of proximal end of right humerus with routine healing, Constipation, Controlled type 2 diabetes mellitus with stage 3 chronic kidney disease, without long-term current use of insulin ([address] COPD (chronic obstructive pulmonary disease) ([address] Difficult intravenous access, Difficulty walking, Diverticulosis, DM2 (diabetes mellitus, type 2) ([address] Full dentures, GERD (gastroesophageal reflux disease), H/O fall, Headache(784.0), [place] (hard of hearing), Hyperlipidemia, Hyperplastic polyp of intestine, Hypertension, Impaired ambulation, Kidney stone, Living in nursing home, Morbid obesity with BMI of 40.0-44.9, adult ([address] Muscle weakness, Nephrolithiasis, Open wound, ECTOR on CPAP, Osteoarthritis, Parkinson disease ([address] Restless leg syndrome, Spinal stenosis in cervical region, Type II or unspecified type diabetes mellitus without mention of complication, not stated as uncontrolled, Urethral caruncle, Wears glasses, and Wears glasses. has a past surgical history that includes Cervical disc surgery (2012); Appendectomy; Tonsillectomy and adenoidectomy (1953); hernia repair (1999); eye surgery (Bilateral, 2017); Cervical spine surgery (5/31/13); laminectomy (05/31/2013); Upper gastrointestinal endoscopy (12/28/2016); Colonoscopy (2009); Colonoscopy (12/29/2016); Colonoscopy (12/30/2016); Colonoscopy (04/25/2017); pr colsc flx w/removal lesion by hot bx forceps (N/A, 4/25/2017); Cystorrhaphy (04/04/2018); pr cystourethroscopy,biopsies (N/A, 4/4/2018); pr [facility] incl fluor gdnce dx w/cell washg spx (N/A, 6/5/2018); Upper gastrointestinal endoscopy (N/A, 8/29/2018); shoulder fracture surgery (Right, 5/28/2019); Hardware Removal (Right, 07/05/2019); and Hardware Removal (Right, 7/5/2019). Restrictions  Restrictions/Precautions  Restrictions/Precautions: General Precautions, Fall Risk  Required Braces or Orthoses?: No  Position Activity Restriction  Other position/activity restrictions:  Up with assist  Vision/Hearing        Subjective  General  Chart Reviewed: Yes  Patient assessed for rehabilitation services?: Yes  Response To Previous Treatment: Not applicable  Family / Caregiver Present: Yes()  Follows Commands: Within Functional Limits  General Comment  Comments: OK for PT per Leslie Denny RN  Pain Screening  Patient Currently in Pain: Yes  Pain Assessment  Pain Assessment: 0-10  Pain Level: 7  Pain Type: Chronic pain  Pain Location: Generalized  Vital Signs  Patient Currently in Pain: Yes       Orientation  Orientation  Overall Orientation Status: Within Normal Limits  Social/Functional History  Social/Functional History  Lives With: Spouse  Type of Home: House  Home Layout: One level  Home Access: Stairs to enter with rails  Entrance Stairs - Number of Steps: 1  Bathroom Shower/Tub: Tub/Shower unit  Bathroom Toilet: Standard(Has bidet)  Home Equipment: Rolling walker, [address] Help From: Family  ADL Assistance: Needs assistance(Needs help with bathing, but can dress indp)  Homemaking Assistance: Needs assistance(Dtr does laundry, planning to get )  Ambulation Assistance: Needs assistance(Always has someone with her)  Transfer Assistance: Independent  Active : No  Occupation: Retired  Leisure & Hobbies: Kacey, computer  IADL Comments: Pt's ability to participate in IADLs is affected by fatigue from COPD and PD  Additional Comments: Dtr assists, but has a learning disability. Pt recognizes that she is at great risk of falls and takes precautions to prevent.   Cognition   Cognition  Overall Cognitive Status: WNL    Objective     Observation/Palpation  Posture: Fair(Trunk flexed posture in standing)    AROM RLE (degrees)  RLE AROM: WNL  AROM LLE (degrees)  LLE AROM : WNL  AROM RUE (degrees)  RUE General AROM: See OT eval for B UE ROM  Strength RLE  Strength RLE: WFL  Comment: B ankles 5/5, hip/knees 3+/5 to 4/5  Strength LLE  Strength LLE: WFL  Strength RUE  Comment: See OT eval for B UE MMT  Tone RLE  RLE Tone: Normotonic  Tone LLE  LLE Tone: Normotonic  Motor Control  Gross Motor?: WNL  Sensation  Overall Sensation Status: Impaired(Paresthesia B UE's/LE's)  Bed mobility  Comment: Pt in chair when PT arrived  Transfers  Sit to Stand: Minimal Assistance;2 Person Assistance  Stand to sit: Minimal Assistance;2 Person Assistance  Stand Pivot Transfers: Minimal Assistance;2 Person Assistance  Ambulation  Ambulation?: Yes  Ambulation 1  Surface: level tile  Device: Rolling Walker  Other Apparatus: O2  Assistance: Minimal assistance;2 Person assistance  Gait Deviations: Slow Trena;Decreased step length  Distance: 35' x 1  Comments: Back to chair  Stairs/Curb  Stairs?: No     Balance  Posture: Fair  Sitting - Static: Good  Sitting - Dynamic: Good  Standing - Static: Fair;+  Standing - Dynamic: [address]  Times per week: 1-2x/day,6-7 days/week  Current Treatment Recommendations: Strengthening, Balance Training, Transfer Training, Endurance Training, Gait Training  Safety Devices  Type of devices: Left in chair, Call light within reach, Chair alarm in place, Gait belt  Restraints  Initially in place: No    G-Code       OutComes Score                                                  AM-PAC Score             Goals  Short term goals  Time Frame for Short term goals: 12 treatments  Short term goal 1: Independent transfers  Short term goal 2: Independent ambulation w/ ' x 1  Short term goal 3: Tolerate 30 min ther act  Short term goal 4: 1/2 to 1 grade strength increase B LE's  Short term goal 5: Good standing balance  Patient Goals   Patient goals : Feel better consistently       Therapy Time   Individual Concurrent Group Co-treatment   Time In 36         Time Out 1400         Minutes 36              Co-treatment with OT warranted secondary to decreased safety and independence requiring 2 skilled therapy professionals to address individual discipline's goals.  Gilberto Laurent, PT

## 2020-10-24 NOTE — PLAN OF CARE
Problem: Skin Integrity:  Goal: Will show no infection signs and symptoms  Description: Will show no infection signs and symptoms  10/24/2020 1144 by Rox Mercado RN  Outcome: Ongoing  10/24/2020 0233 by Darshana Thayer RN  Outcome: Ongoing  Goal: Absence of new skin breakdown  Description: Absence of new skin breakdown  10/24/2020 0233 by Darshana Thayer RN  Outcome: Ongoing     Problem: Falls - Risk of:  Goal: Will remain free from falls  Description: Will remain free from falls  10/24/2020 1144 by Rox Mercado RN  Outcome: Ongoing  10/24/2020 0233 by Darshana Thayer RN  Outcome: Ongoing  Goal: Absence of physical injury  Description: Absence of physical injury  10/24/2020 0233 by Darshana Thayer RN  Outcome: Ongoing     Problem: Nutrition  Goal: Optimal nutrition therapy  10/24/2020 1144 by Rox Mercado RN  Outcome: Ongoing  10/24/2020 0233 by Darshana Thayer RN  Outcome: Ongoing     Problem: Discharge Planning:  Goal: Discharged to appropriate level of care  Description: Discharged to appropriate level of care  Outcome: Ongoing     Problem:  Activity Intolerance:  Goal: Ability to tolerate increased activity will improve  Description: Ability to tolerate increased activity will improve  Outcome: Ongoing     Problem: Airway Clearance - Ineffective:  Goal: Ability to maintain a clear airway will improve  Description: Ability to maintain a clear airway will improve  Outcome: Ongoing     Problem: Breathing Pattern - Ineffective:  Goal: Ability to achieve and maintain a regular respiratory rate will improve  Description: Ability to achieve and maintain a regular respiratory rate will improve  Outcome: Ongoing     Problem: Gas Exchange - Impaired:  Goal: Levels of oxygenation will improve  Description: Levels of oxygenation will improve  Outcome: Ongoing     Problem: Serum Glucose Level - Abnormal:  Goal: Ability to maintain appropriate glucose levels will improve  Description: Ability to maintain appropriate glucose levels will improve  Outcome: Ongoing

## 2020-10-24 NOTE — PLAN OF CARE
Problem: Skin Integrity:  Goal: Will show no infection signs and symptoms  Description: Will show no infection signs and symptoms  10/24/2020 1144 by Jackie Alston RN  Outcome: Ongoing  10/24/2020 0233 by Briseyda Perez RN  Outcome: Ongoing  Goal: Absence of new skin breakdown  Description: Absence of new skin breakdown  10/24/2020 0233 by Briseyda Perez RN  Outcome: Ongoing     Problem: Falls - Risk of:  Goal: Will remain free from falls  Description: Will remain free from falls  10/24/2020 1144 by Jackie Alston RN  Outcome: Ongoing  10/24/2020 0233 by Briseyda Perez RN  Outcome: Ongoing  Goal: Absence of physical injury  Description: Absence of physical injury  10/24/2020 0233 by Briseyda Perez RN  Outcome: Ongoing     Problem: Nutrition  Goal: Optimal nutrition therapy  10/24/2020 1144 by Jackie Alston RN  Outcome: Ongoing  10/24/2020 0233 by Briseyda Perez RN  Outcome: Ongoing

## 2020-10-24 NOTE — PROGRESS NOTES
Pt. Is very anxious and stated that she normally gets like this with steroids. Pt Spo2 is 98% on 2L NC. And HR is 78bpm. Pt. Is requesting me to call doctor on call for something to calm her down.  Putting a page out to Dr. Lynda Gardiner waiting on response

## 2020-10-24 NOTE — PROGRESS NOTES
Speech Language Pathology  Facility/Department: [facility] CARE   CLINICAL BEDSIDE SWALLOW EVALUATION    NAME: Johanna Bo  : 1945  MRN: [id number]    ADMISSION DATE: 10/22/2020  ADMITTING DIAGNOSIS: has Controlled type 2 diabetes mellitus with stage 3 chronic kidney disease, without long-term current use of insulin ([address] Hyperlipidemia; Essential hypertension; Chronic leg pain; Paroxysmal atrial fibrillation ([address] Asthma; Gastroesophageal reflux disease without esophagitis; ECTOR on CPAP; Type 2 diabetes mellitus with complication, without long-term current use of insulin ([address] Spinal stenosis in cervical region; Hypomagnesemia; Hyperphosphaturia; Hypocalcemia; Parkinson's disease ([address] Acute cystitis with hematuria; Altered mental status; Iron deficiency anemia due to chronic blood loss; Morbid obesity with BMI of 40.0-44.9, adult ([address] Hyperplastic polyp of intestine; Diverticulosis; Panlobular emphysema ([address] Rapid atrial fibrillation (HCC); CKD (chronic kidney disease) stage 3, GFR 30-59 ml/min; Anemia in chronic kidney disease; Chronic respiratory failure with hypoxia and hypercapnia ([address] Acute kidney injury superimposed on chronic kidney disease ([address] CKD stage 4 due to type 2 diabetes mellitus ([address] E. coli UTI (urinary tract infection); Nephrolithiasis; Candidal intertrigo; Venous insufficiency; Malabsorption; Anemia of chronic renal failure, stage 4 (severe) ([address] Iron deficiency; Coronary atherosclerosis; COPD exacerbation ([address] Restless leg syndrome; SIRS (systemic inflammatory response syndrome) ([address] Nausea and vomiting in adult; Bacterial UTI; Odynophagia; Esophageal dysphagia; Candida esophagitis ([address] Sepsis due to urinary tract infection ([address] Chronic respiratory failure with hypoxia ([address] Chronic diastolic congestive heart failure ([address] History of ESBL E. coli infection; Stage 4 chronic kidney disease ([address] Fever; Macrocytic anemia;  Hypercalcemia; Monoclonal gammopathy of undetermined significance; Acute nonintractable headache; Anemia of chronic renal failure, stage 4 (severe) ([address] Traumatic closed displaced fracture of proximal end of right humerus, initial encounter; Urinary tract infection without hematuria; Metabolic encephalopathy; Chest pain; Acute kidney injury ([address] Right leg swelling; Hematuria; Chest pain, unspecified; and Chronic obstructive pulmonary disease with (acute) exacerbation ([address] on their problem list.    Recent Chest Xray: (  10-  )    Impression    Right infrahilar opacity appears linear and is likely atelectasis.             Date of Eval: 10/24/2020  Evaluating Therapist: Gilmar Zelaya    Current Diet level:  Current Diet : Regular  Current Liquid Diet : Thin      Primary Complaint: Per chart, Emery Paul is a 70 y.o. female who presents to the emergency department for difficulty breathing. This started 1 hour before coming in. She was noted to have a low O2 sat and was placed on CPAP by the paramedics and transported here. She has not had a known fever and has not had a cough. No vomiting. She has a history of COPD and CHF. Symptom has been continuous and she felt better with the CPAP. Pain:  Pain Assessment  Pain Assessment: 0-10  Pain Level: 0    Reason for Referral  Emery Paul was referred for a bedside swallow evaluation to assess the efficiency of her swallow function, identify signs and symptoms of aspiration and make recommendations regarding safe dietary consistencies, effective compensatory strategies, and safe eating environment. Impression  Patient presents with probable safe swallow for Dysphagia Soft & Bite-Sized (Dys III) diet with thin liquids as evidenced by no overt s/s of aspiration noted with consistencies tested. Pt reports she receives in-home speech therapy multiple times a week for dysphagia.  Recommend small sips and bites, only feed when alert and awake and upright at 90 degrees for all PO intake. Recommend close monitoring for overt/clinical s/s of aspiration and D/C PO intake and complete Modified Barium Swallow Study should they occur. Results and recommendations reviewed with pt who verbalized understanding. Dysphagia Diagnosis: Swallow function appears grossly intact  Dysphagia Outcome Severity Scale: Level 6: Within functional limits/Modified independence     Treatment Plan  Requires SLP Intervention: Yes  Duration/Frequency of Treatment: 1-2x per week  D/C Recommendations: Further therapy recommended at discharge. Recommended Diet and Intervention  Diet Solids Recommendation: Dysphagia Soft and Bite-Sized (Dysphagia III)  Liquid Consistency Recommendation: Thin  Recommended Form of Meds: PO     Therapeutic Interventions: Diet tolerance monitoring;Patient/Family education    Compensatory Swallowing Strategies  Compensatory Swallowing Strategies: Eat/Feed slowly;Upright as possible for all oral intake;Small bites/sips    Treatment/Goals  Dysphagia Goals: The patient will tolerate recommended diet without observed clinical signs of aspiration; The patient/caregiver will demonstrate understanding of compensatory strategies for improved swallowing safety. General  Chart Reviewed: Yes  Behavior/Cognition: Alert; Cooperative;Pleasant mood  Temperature Spikes Noted: No  Respiratory Status: Room air  O2 Device: Bi-PAP  Communication Observation: Functional  Follows Directions: Simple  Dentition: Adequate  Patient Positioning: Upright in chair  Baseline Vocal Quality: Dysphonic  Consistencies Administered: Reg solid; Dysphagia Soft and Bite-Sized (Dysphagia III); Dysphagia Pureed (Dysphagia I); Thin - cup    Pain Level: 0    Vision/Hearing  Vision  Vision: Impaired  Vision Exceptions: Wears glasses at all times  Hearing  Hearing: Within functional limits    Oral Motor Deficits  Oral/Motor  Oral Motor:  Within functional limits    Oral Phase Dysfunction  Oral Phase  Oral Phase: [facility] Indicators of Pharyngeal Phase Dysfunction   Pharyngeal Phase  Pharyngeal Phase: WFL  Pharyngeal Phase   Pharyngeal: No overt s/s of aspiration observed with consistencies tested.     Prognosis  Prognosis  Prognosis for safe diet advancement: good  Individuals consulted  Consulted and agree with results and recommendations: Patient    Education  Patient Education: yes  Patient Education Response: Verbalizes understanding             Therapy Time  SLP Individual Minutes  Time In: 6296  Time Out: Sterling Stein  Minutes: P.JEREMIAS Tins[address], M.S. [id number] [facility]    10/24/2020 12:51 PM

## 2020-10-24 NOTE — PROGRESS NOTES
Subjective: Follow-up type 2 diabetes  .   No polydipsia no hypoglycemia blood sugars reviewed slightly elevated secondary to steroids  ROS  No chills no GI/ complaints, no cardiac complaints, still with shortness of breath hypoxia tachypnea wheezing, no TIA no bleeding no headache no sore throat no skin lesions no fatigue  physical exam  General Appearance: alert and oriented to person, place and time and in no acute distress  Skin: warm and dry, no rash or erythema  Head: normocephalic and atraumatic  Eyes: pupils equal, round, and reactive to light, sclera anicteric and pallor    Neck: neck supple and non tender without mass   Pulmonary/Chest: Air entry equal bilateral rhonchi and expiratory wheezing no crackles decreased at the bases no use of intercostal muscles  Cardiovascular: normal rate, regular rhythm, normal S1 and S2, no gallops, intact distal pulses and no carotid bruits  Abdomen: soft, non-tender, non-distended, normal bowel sounds, no masses or organomegaly  Extremities: no edema and good pulses no Homans' sign  Neurologic: Alert oriented x3 with no focal deficit    BP (!) 140/65   Pulse 84   Temp 97.9 °F (36.6 °C) (Axillary)   Resp 18   Ht 4' 9\" (1.448 m)   Wt 189 lb (85.7 kg)   LMP 04/03/2004 (Within Years)   SpO2 96%   BMI 40.90 kg/m²     CBC:   Lab Results   Component Value Date    WBC 10.3 10/24/2020    RBC 2.81 10/24/2020    HGB 8.9 10/24/2020    HCT 30.7 10/24/2020    .3 10/24/2020    MCH 31.7 10/24/2020    MCHC 29.0 10/24/2020    RDW 15.8 10/24/2020     10/24/2020    MPV 11.1 10/24/2020     BMP:    Lab Results   Component Value Date     10/24/2020    K 4.7 10/24/2020    CL 97 10/24/2020    CO2 35 10/24/2020    BUN 37 10/24/2020    LABALBU 3.8 09/23/2020    LABALBU Detected 11/30/2018    CREATININE 1.87 10/24/2020    CALCIUM 7.9 10/24/2020    GFRAA 32 10/24/2020    LABGLOM 26 10/24/2020    GLUCOSE 164 10/24/2020    GLUCOSE 132 03/07/2012 Assessment:  Patient Active Problem List   Diagnosis    Controlled type 2 diabetes mellitus with stage 3 chronic kidney disease, without long-term current use of insulin (HCC)    Hyperlipidemia    Essential hypertension    Chronic leg pain    Paroxysmal atrial fibrillation (HCC)    Asthma    Gastroesophageal reflux disease without esophagitis    ECTOR on CPAP    Type 2 diabetes mellitus with complication, without long-term current use of insulin ([address]    Spinal stenosis in cervical region    Hypomagnesemia    Hyperphosphaturia    Hypocalcemia    Parkinson's disease ([address]    Acute cystitis with hematuria    Altered mental status    Iron deficiency anemia due to chronic blood loss    Morbid obesity with BMI of 40.0-44.9, adult (HCC)    Hyperplastic polyp of intestine    Diverticulosis    Panlobular emphysema (HCC)    Rapid atrial fibrillation (HCC)    CKD (chronic kidney disease) stage 3, GFR 30-59 ml/min    Anemia in chronic kidney disease    Chronic respiratory failure with hypoxia and hypercapnia (HCC)    Acute kidney injury superimposed on chronic kidney disease ([address]    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 coli UTI (urinary tract infection)    Nephrolithiasis    Candidal intertrigo    Venous insufficiency    Malabsorption    Anemia of chronic renal failure, stage 4 (severe) (HCC)    Iron deficiency    Coronary atherosclerosis    COPD exacerbation (HCC)    Restless leg syndrome    SIRS (systemic inflammatory response syndrome) (HCC)    Nausea and vomiting in adult    Bacterial UTI    Odynophagia    Esophageal dysphagia    Candida esophagitis (HCC)    Sepsis due to urinary tract infection (HCC)    Chronic respiratory failure with hypoxia (HCC)    Chronic diastolic congestive heart failure (HCC)    History of ESBL E. coli infection    Stage 4 chronic kidney disease (HCC)    Fever    Macrocytic anemia    Hypercalcemia    Monoclonal gammopathy of undetermined significance    Acute nonintractable headache    Anemia of chronic renal failure, stage 4 (severe) (HCC)    Traumatic closed displaced fracture of proximal end of right humerus, initial encounter    Urinary tract infection without hematuria    Metabolic encephalopathy    Chest pain    Acute kidney injury ([address]    Right leg swelling    Hematuria    Chest pain, unspecified    Chronic obstructive pulmonary disease with (acute) exacerbation (HCC)     Acute on chronic respiratory failure with hypoxia hypercapnia  COPD exacerbation  Acute kidney injury on top of CKD 3  Type 2 diabetes with hyperglycemia and renal complication none insulin dependent  Obesity BMI of 35-39.9  Essential hypertension  Paroxysmal atrial fibrillation  Chronic anticoagulation  Macrocytic anemia  Plan:    Meds labs reviewed, continue with before meals and at bedtime Accu-Cheks with insulin coverage avoid nephrotoxic drugs we will have physical therapy occupational therapy see the patient using BiPAP as needed, continue with IV steroids empiric antibiotic bronchodilators continue with the anticoagulation slow hydration see orders      Matthew Santacruz MD  11:45 AM

## 2020-10-25 NOTE — CONSULTS
Consult Note    Reason for Consult: Elevated creatinine. Requesting Physician:  Maia Hawk MD    HISTORY OF PRESENT ILLNESS:    The patient is a 70 y.o. female who presents with cough with phlegm and shortness of breath. On previous labs her serum creatinines have been between 1.4 to 1.7 with eGFR of 20s ml/min. On admission she was noted to have elevated creatinine of 2.02 that has improved to 1.8 to 1.9. Denies any problems with nausea, vomiting, appetite, diarrhea or difficulty with urination. Denies any recent use of NSAIDs or iv contrast. Quit NSAIDs use couple years ago. Review Of Systems:  Constitutional: No fever, chills, lethargy, weakness or wt loss. HEENT:  No headache, nasal discharge or sore throat. Cardiac:  No chest pain, Co dyspnea. Chest:              No cough, phlegm or wheezing. Abdomen:  No abdominal pain, nausea, vomiting or diarrhea. Neuro:   No gross focal weakness, numbness, abnormal movements or seizure like activity. Skin:   No rashes or itching. :   No hematuria, pyuria, dysuria or flank pain. Extremities:  No swelling or joint pains. Endocrine: No polyuria, polydypsia, or thyroid problems. Hematology:    No bleeding disorders, bruising or anemia. All other ROS is negative.       Past Medical History:   Diagnosis Date    Acute on chronic diastolic congestive heart failure ([address]     Anesthesia complication     DIFFICULTY WAKING OP    Asthma     Atrial fibrillation ([address] 2013    Cataracts, bilateral     Cellulitis     BILAT LOWER LEGS-PT STATES IS IMPROVING    Cerebral artery occlusion with cerebral infarction [facility])     Last stroke was February 2017 w/no deficits-TOTAL OF 3    CHF (congestive heart failure) (HCC)     Chronic kidney disease 2013    NOT ON DIALYSIS YET    Chronic kidney disease     Closed fracture of proximal end of right humerus with routine healing     Constipation     CHRONIC    Controlled type 2 diabetes mellitus with stage 3 chronic daily 1/3/20  Yes George Suh MD   fluticasone-vilanterol (BREO ELLIPTA) 100-25 MCG/INH AEPB inhaler Inhale 2 puffs into the lungs daily   Yes Historical Provider, MD   albuterol (PROVENTIL) (2.5 MG/3ML) 0.083% nebulizer solution Take 3 mLs by nebulization 4 times daily 9/11/19  Yes George Suh MD   miconazole (MICOTIN) 2 % powder Apply topically 2 times daily.  9/11/19  Yes George Suh MD   melatonin 5 MG TABS tablet Take 5 mg by mouth nightly as needed (sleep)   Yes Historical Provider, MD   furosemide (LASIX) 20 MG tablet Take 1 tablet by mouth daily 5/30/19  Yes So Hart MD   atorvastatin (LIPITOR) 20 MG tablet TAKE 1 TABLET DAILY 4/18/19  Yes Lesly Hill DO   carbidopa-levodopa (SINEMET CR)  MG per extended release tablet Take 1 tablet by mouth 4 times daily 4/9/19  Yes Lesly Hill DO   rOPINIRole (REQUIP) 2 MG tablet Take 1 tablet by mouth 3 times daily 4/9/19  Yes Lesly Hill DO   pantoprazole (PROTONIX) 40 MG tablet TAKE 1 TABLET TWICE A DAY BEFORE MEALS  Patient taking differently: daily  2/7/19  Yes Lesly Hill DO   linagliptin (TRADJENTA) 5 MG tablet Take 0.5 tablets by mouth daily  Patient taking differently: Take 5 mg by mouth daily  1/16/19  Yes Lesly Hill DO   amiodarone (CORDARONE) 200 MG tablet Take 200 mg by mouth daily    Yes Historical Provider, MD   magnesium oxide (MAG-OX) 400 (241.3 Mg) MG TABS tablet Take 1 tablet by mouth 2 times daily  Patient taking differently: Take 400 mg by mouth daily  9/7/18  Yes Godfrey Marcos,    senna (SENOKOT) 8.6 MG tablet Take 1-2 tablets by mouth nightly    Yes Historical Provider, MD   ferrous sulfate 325 (65 Fe) MG EC tablet Take 1 tablet by mouth 2 times daily (with meals) 12/21/17  Yes Jose Perez,    rasagiline mesylate 1 MG TABS Take 1 tablet by mouth daily   Yes Historical Provider, MD   calcitRIOL (ROCALTROL) 0.25 MCG capsule Take 0.75 mcg by mouth daily     Historical Provider, MD Iker Calderon strip 1 each by In Vitro route daily As needed. 3/4/19   Lesly Hill, DO   ONE TOUCH ULTRA TEST strip USE 1 STRIP THREE TIMES A DAY 12/26/18   Lesly Hill, DO   Oxygen Concentrator Dx: Pneumonia, use as directed. Patient taking differently: Dx: Pneumonia, use as directed.    ON 24/7 2/10/17   Nyla Hill DO       Scheduled Meds:   methylPREDNISolone  40 mg Intravenous Q12H    apixaban  2.5 mg Oral BID    albuterol  2.5 mg Nebulization 4x daily    amiodarone  200 mg Oral Daily    atorvastatin  20 mg Oral Daily    calcitRIOL  0.75 mcg Oral Daily    carbidopa-levodopa  1 tablet Oral 4x Daily    budesonide-formoterol  2 puff Inhalation BID    furosemide  20 mg Oral Daily    isosorbide mononitrate  30 mg Oral Daily    metoprolol tartrate  25 mg Oral Daily    pantoprazole  40 mg Oral QAM AC    rasagiline  1 mg Oral Daily    rOPINIRole  2 mg Oral TID    tiotropium  2 puff Inhalation Daily    sodium chloride flush  10 mL Intravenous 2 times per day    azithromycin  500 mg Intravenous Q24H    insulin lispro  0-6 Units Subcutaneous TID WC    insulin lispro  0-3 Units Subcutaneous Nightly     Continuous Infusions:   sodium chloride 50 mL/hr at 10/25/20 0325    dextrose       PRN Meds:melatonin, albuterol, ALPRAZolam, sodium chloride flush, acetaminophen **OR** acetaminophen, polyethylene glycol, promethazine **OR** ondansetron, glucose, dextrose, glucagon (rDNA), dextrose    Allergies   Allergen Reactions    Dye [Barium-Containing Compounds] Other (See Comments)     Cause Afib per     Pcn [Penicillins] Itching and Swelling    Bactrim [Sulfamethoxazole-Trimethoprim] Other (See Comments)     Allergic Nephritis       Social History     Socioeconomic History    Marital status:      Spouse name: Not on file    Number of children: Not on file    Years of education: Not on file    Highest education level: Not on file   Occupational History    Not on file   Social Needs    Financial 09/23/2020    BILITOT 0.19 (L) 09/03/2020    BILITOT 0.18 (L) 02/28/2020    ALKPHOS 57 09/23/2020    ALKPHOS 70 09/03/2020    ALKPHOS 63 02/28/2020     BNP:   Lab Results   Component Value Date    BNP 40 06/07/2013     (H) 05/31/2013     Lipids:   Lab Results   Component Value Date    CHOL 135 08/26/2017    HDL 30 (L) 08/26/2017     INR:   Lab Results   Component Value Date    INR 1.0 12/30/2019    INR 0.9 09/01/2019    INR 1.0 08/30/2019     PTH: No results found for: PTH  Phosphorus:    Lab Results   Component Value Date    PHOS 5.0 09/11/2019     Ionized Calcium: No results found for: IONCA  Magnesium:   Lab Results   Component Value Date    MG 1.4 02/28/2020     Albumin:   Lab Results   Component Value Date    LABALBU 3.8 09/23/2020    LABALBU Detected 11/30/2018     Last 3 CK, CKMB, Troponin: @LABRCNT(CKTOTAL:3,CKMB:3,TROPONINI:3)       URINE:)No results found for: Monachucky BoucherAbimbola    Radiology:  Reviewed. Assessment:  CKD stage 4 with baseline GFR of 20s ml/min. Creatinine is at baseline. Hypertension. Anemia. Hypoparathyroidism. History of CHF and COPD. Plan:  Agree with gentle hydration. Will discontinue Calcitriol for now. Renal diet. Avoid hypotension, nephrotoxic drugs, Lovenox, Fleets enema and IV contrast exposure. Follow up labs ordered for AM.       Thank you for the consultation. Please do not hesitate to call with questions. Electronically signed by Mele Boyd MD  on 10/25/2020 at 10:55 AM   [facility].   Ph: [phone]

## 2020-10-25 NOTE — PROGRESS NOTES
Subjective:   Follow-up type 2 diabetes  Polyuria no polydipsia no hypoglycemia blood sugars reviewed in the 200s secondary to steroids    ROS  No fever no chills no GI/ complaints no cardiac complaints still with shortness of breath with any exertion tachypnea no PND no orthopnea, no TIA no bleeding no headache no sore throat no skin lesions positive fatigue  physical exam  General Appearance: alert and oriented to person, place and time and in no acute distress  Skin: warm and dry, no rash or erythema  Head: normocephalic and atraumatic  Eyes: pupils equal, round, and reactive to light, sclera anicteric and pallor    Neck: neck supple and non tender without mass   Pulmonary/Chest: Entry equal few rhonchi decreased at the bases no crackles no use of intercostal muscles  Cardiovascular: normal rate, regular rhythm, normal S1 and S2, no gallops, intact distal pulses and no carotid bruits  Abdomen: soft, non-tender, non-distended, normal bowel sounds, no masses or organomegaly  Extremities: no edema and pulses no Homans' sign  Neurologic: Alert oriented x3 with no focal deficit    BP (!) 155/72   Pulse 64   Temp 98.4 °F (36.9 °C) (Oral)   Resp 18   Ht 4' 9\" (1.448 m)   Wt 189 lb (85.7 kg)   LMP 04/03/2004 (Within Years)   SpO2 100%   BMI 40.90 kg/m²     CBC with Differential:    Lab Results   Component Value Date    WBC 11.2 10/25/2020    RBC 2.77 10/25/2020    HGB 8.8 10/25/2020    HCT 31.0 10/25/2020     10/25/2020    .9 10/25/2020    MCH 31.8 10/25/2020    MCHC 28.4 10/25/2020    RDW 16.1 10/25/2020    NRBC 1 06/11/2018    METASPCT 1 06/10/2018    LYMPHOPCT 6 10/25/2020    MONOPCT 5 10/25/2020    BASOPCT 0 10/25/2020    MONOSABS 0.56 10/25/2020    LYMPHSABS 0.67 10/25/2020    EOSABS 0.00 10/25/2020    BASOSABS 0.00 10/25/2020    DIFFTYPE NOT REPORTED 10/25/2020     BMP:    Lab Results   Component Value Date     10/25/2020    K 4.6 10/25/2020    CL 99 10/25/2020    CO2 33 10/25/2020 BUN 44 10/25/2020    LABALBU 3.8 09/23/2020    LABALBU Detected 11/30/2018    CREATININE 1.91 10/25/2020    CALCIUM 7.4 10/25/2020    GFRAA 31 10/25/2020    LABGLOM 26 10/25/2020    GLUCOSE 193 10/25/2020    GLUCOSE 132 03/07/2012        Assessment:  Patient Active Problem List   Diagnosis    Controlled type 2 diabetes mellitus with stage 3 chronic kidney disease, without long-term current use of insulin (HCC)    Hyperlipidemia    Essential hypertension    Chronic leg pain    Paroxysmal atrial fibrillation (HCC)    Asthma    Gastroesophageal reflux disease without esophagitis    ECTOR on CPAP    Type 2 diabetes mellitus with complication, without long-term current use of insulin (HCC)    Spinal stenosis in cervical region    Hypomagnesemia    Hyperphosphaturia    Hypocalcemia    Parkinson's disease ([address]    Acute cystitis with hematuria    Altered mental status    Iron deficiency anemia due to chronic blood loss    Morbid obesity with BMI of 40.0-44.9, adult (HCC)    Hyperplastic polyp of intestine    Diverticulosis    Panlobular emphysema (HCC)    Rapid atrial fibrillation (HCC)    CKD (chronic kidney disease) stage 3, GFR 30-59 ml/min    Anemia in chronic kidney disease    Chronic respiratory failure with hypoxia and hypercapnia (HCC)    Acute kidney injury superimposed on chronic kidney disease ([address]    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 coli UTI (urinary tract infection)    Nephrolithiasis    Candidal intertrigo    Venous insufficiency    Malabsorption    [facility], [facility] ([facility]) ([facility]    Iron deficiency    Coronary atherosclerosis    COPD exacerbation (HCC)    Restless leg syndrome    SIRS (systemic inflammatory response syndrome) ([facility])    Nausea and vomiting in adult    Bacterial UTI    Odynophagia    Esophageal dysphagia    Candida esophagitis (HCC)    Sepsis due to urinary tract infection (HCC)    Chronic respiratory failure with hypoxia (HCC)    Chronic diastolic congestive heart failure (HCC)    History of ESBL E. coli infection    Stage 4 chronic kidney disease (HCC)    Fever    Macrocytic anemia    Hypercalcemia    Monoclonal gammopathy of undetermined significance    Acute nonintractable headache    Anemia of chronic [facility] failure, stage 4 (severe) ([facility])    Traumatic closed displaced fracture of proximal end of right humerus, initial encounter    Urinary tract infection without hematuria    Metabolic encephalopathy    Chest pain    [facility]    Right leg swelling    Hematuria    Chest pain, unspecified    Chronic obstructive pulmonary disease with (acute) exacerbation (HCC)     Acute on chronic respiratory failure with hypoxia hypercapnia  COPD exacerbation  CKD 4 no acute kidney injury  Type 2 diabetes with hyperglycemia with renal complications  Obesity BMI of 35-39.9  Essential hypertension  Paroxysmal atrial fibrillation  Chronic anticoagulation  Macrocytic anemia  ECTOR  on CPAP  Parkinson's disease plan:    Meds labs reviewed, avoid nephrotoxic drugs including NSAIDs, continue with before meals and at bedtime Accu-Cheks with insulin coverage as needed, physical therapy occupational therapy, continue using BiPAP, decrease IV steroids continue with bronchodilators and antibiotics see orders      Madelyn Barajas MD  11:40 AM

## 2020-10-25 NOTE — PROGRESS NOTES
Pulmonary Critical Care Progress Note  Gena Nelson, ANSHUL-JOSE/Molina Perez MD     Patient seen for the follow up of acute on chronic hypoxic/hypercarbic respiratory failure, acute exacerbation of COPD, chronic diastolic heart failure, ECTOR/obesity    Subjective:  No significant overnight events noted. She is sitting up in the bedside chair, wearing oxygen at 3 L/nasal cannula. She slept fairly well last night but still has complaints of excessive daytime sleepiness. She is scheduled for repeat sleep study as an outpatient next week already. Her shortness of breath is better than yesterday. She denies any chest pain or significant cough. Examination:  Vitals: BP (!) 176/77   Pulse 84   Temp 97.8 °F (36.6 °C) (Oral)   Resp 18   Ht 4' 9\" (1.448 m)   Wt 189 lb (85.7 kg)   LMP 04/03/2004 (Within Years)   SpO2 95%   BMI 40.90 kg/m²   General appearance:  alert and cooperative with exam, up in chair  Neck: No JVD  Lungs: Decreased air exchange, no wheezing at this time  Heart: regular rate and rhythm, S1, S2 normal, no gallop  Abdomen: Soft, non tender, + BS  Extremities: no cyanosis or clubbing.  No significant edema    LABs:  CBC:   Recent Labs     10/24/20  0540 10/25/20  0557   WBC 10.3 11.2   HGB 8.9* 8.8*   HCT 30.7* 31.0*   * 137*     BMP:   Recent Labs     10/24/20  0540 10/25/20  0557    141   K 4.7 4.6   CO2 35* 33*   BUN 37* 44*   CREATININE 1.87* 1.91*   LABGLOM 26* 26*   GLUCOSE 164* 193*     ABG:  Lab Results   Component Value Date    RIF9RRH 40 06/04/2019    FIO2 36.0 06/04/2019       Lab Results   Component Value Date    POCPH 7.50 06/04/2019    POCPCO2 50 06/04/2019    POCPO2 151 06/04/2019    POCHCO3 38.8 06/04/2019    NBEA NOT REPORTED 06/04/2019    PBEA 16 06/04/2019    UAB5QII 40 06/04/2019    EHFM7TTE 99 06/04/2019    FIO2 36.0 06/04/2019     Radiology:  10/24/20        Impression:  · Acute on chronic hypoxic/hypercarbic respiratory failure  · Acute exacerbation of COPD/former smoker, [id number] history  · Chronic diastolic heart failure  · Obstructive sleep apnea/Obesity, noncompliant on CPAP  · KRISTIN on CKD  · Elevated troponin  · A. fib, anxiety, DM, HLD, HTN,  GERD    Recommendations:  · Continue IV Zithromax empirically  · IV solu-medrol 40 mg every 8 hours, decrease frequency  · Albuterol Q 4 hours and prn  · Symbicort 160  · Spiriva Respimat  · IV fluids per primary, monitor renal function  · Labs: CBC and BMP in am  · BiPAP or home CPAP while asleep and as necessary during the day  · Oxygen via nasal cannula, keep SPO2 greater than 92%   · DVT prophylaxis, on Eliquis   · PT/OT  · Discussed with RN  · Above assessment and plan will be reviewed with Dr. Nadege Mendoza. Final plan when Dr. Nadege Mendoza rounds.   · Will follow with you    Robert Bowman APRN-CNP   Pulmonary Critical Care and Sleep Medicine,  Patient seen under the supervision of Rangel Claros MD, [facility]

## 2020-10-25 NOTE — PLAN OF CARE
Sp02 stable at  on 3 L. Up to chair today with PT/OT to follow. Steroid now being tapered and cont on respiratory treatment and IV Zithromax. Free from falls, educated on Fall precautions; in place.

## 2020-10-25 NOTE — PROGRESS NOTES
type diabetes mellitus without mention of complication, not stated as uncontrolled, Urethral caruncle, Wears glasses, and Wears glasses. has a past surgical history that includes Cervical disc surgery (2012); Appendectomy; Tonsillectomy and adenoidectomy (1953); hernia repair (1999); eye surgery (Bilateral, 2017); Cervical spine surgery (5/31/13); laminectomy (05/31/2013); Upper gastrointestinal endoscopy (12/28/2016); Colonoscopy (2009); Colonoscopy (12/29/2016); Colonoscopy (12/30/2016); Colonoscopy (04/25/2017); pr colsc flx w/removal lesion by hot bx forceps (N/A, 4/25/2017); Cystorrhaphy (04/04/2018); pr cystourethroscopy,biopsies (N/A, 4/4/2018); [facility]/cell washg spx (N/A, 6/5/2018); Upper gastrointestinal endoscopy (N/A, 8/29/2018); shoulder fracture surgery (Right, 5/28/2019); Hardware Removal (Right, 07/05/2019); and Hardware Removal (Right, 7/5/2019). Restrictions  Restrictions/Precautions  Restrictions/Precautions: General Precautions, Fall Risk  Required Braces or Orthoses?: No  Position Activity Restriction  Other position/activity restrictions: Up with assist  Subjective   General  Chart Reviewed: Yes  Family / Caregiver Present: Yes(pts )  General Comment  Comments: Pt agreeable for therapy. Pain Screening  Patient Currently in Pain: Denies  Vital Signs  Patient Currently in Pain: Denies       Orientation     Cognition      Objective   Bed mobility  Comment: Pt in chair upon therapist arrival.  Transfers  Sit to Stand: Contact guard assistance  Stand to sit: Contact guard assistance  Stand Pivot Transfers: Contact guard assistance  Ambulation  Ambulation?: Yes  Ambulation 1  Surface: level tile  Device: Rolling Walker  Assistance: Contact guard assistance  Gait Deviations: Slow Trena; Increased NANCY; Decreased step length;Decreased step height;Deviated path  Distance: 20 ft  Comments: Pt stated she felt nausea while walking but was able to complete ambulation without any problems. Needed assist with walker guidence around objects in room. Exercises  Comments: Seated and standing exercises x 10                        G-Code     OutComes Score                                                     AM-PAC Score             Goals  Short term goals  Time Frame for Short term goals: 12 treatments  Short term goal 1: Independent transfers  Short term goal 2: Independent ambulation w/ ' x 1  Short term goal 3: Tolerate 30 min ther act  Short term goal 4: 1/2 to 1 grade strength increase B LE's  Short term goal 5: Good standing balance  Patient Goals   Patient goals : Feel better consistently    Plan    Plan  Times per week: 1-2x/day,6-7 days/week  Current Treatment Recommendations: Strengthening, Balance Training, Transfer Training, Endurance Training, Gait Training  Safety Devices  Type of devices:  All fall risk precautions in place, Call light within reach, Chair alarm in place, Gait belt, Left in chair, Nurse notified(pt's  in room)  Restraints  Initially in place: No     Therapy Time   Individual Concurrent Group Co-treatment   Time In 1100         Time Out 1130         Minutes 30                 Radha Garcia, PTA

## 2020-10-26 NOTE — PROGRESS NOTES
Physical Therapy  Facility/Department: [facility] PROGRESSIVE CARE  Daily Treatment Note  NAME: Jason Park  : 1945  MRN: [id number]    Date of Service: 10/26/2020    Discharge Recommendations:  Home with Home health PT, 24 hour supervision or assist       RN reports patient is medically stable for therapy treatment this date. Chart reviewed prior to treatment and patient is agreeable for therapy. All lines intact and patient positioned comfortably at end of treatment. All patient needs addressed prior to ending therapy session. Assessment   Body structures, Functions, Activity limitations: Decreased strength;Decreased endurance;Decreased balance  Assessment: Pt tolerated PT session well, limited by fatigue and endurance this date. Demo'd SOB after gait training with no drop in SpO2. Prognosis: Good  Clinical Presentation: evolving  PT Education: Transfer Training;PT Role;Energy Conservation; Functional Mobility Training;Plan of Care;Gait Training;General Safety  Patient Education: Extensive education with pt's  on d/c planning  REQUIRES PT FOLLOW UP: Yes  Activity Tolerance  Activity Tolerance: Patient Tolerated treatment well;Patient limited by fatigue;Patient limited by endurance     Patient Diagnosis(es): The encounter diagnosis was COPD exacerbation ([address]      has a past medical history of Acute on chronic diastolic congestive heart failure ([address] Anesthesia complication, Asthma, Atrial fibrillation ([address] Cataracts, bilateral, Cellulitis, Cerebral artery occlusion with cerebral infarction ([address] CHF (congestive heart failure) ([address] Chronic kidney disease, Chronic kidney disease, Closed fracture of proximal end of right humerus with routine healing, Constipation, Controlled type 2 diabetes mellitus with stage 3 chronic kidney disease, without long-term current use of insulin ([address] COPD (chronic obstructive pulmonary disease) ([address] Difficult intravenous access, Difficulty walking, Diverticulosis, DM2 (diabetes mellitus, type 2) ([facility] Full dentures, GERD (gastroesophageal reflux disease), H/O fall, Headache(784.0), [place] (hard of hearing), Hyperlipidemia, Hyperplastic polyp of intestine, Hypertension, Impaired ambulation, Kidney stone, Living in nursing home, Morbid obesity with BMI of 40.0-44.9, adult ([facility] Muscle weakness, Nephrolithiasis, Open wound, ECTOR on CPAP, Osteoarthritis, Parkinson disease ([facility] [address] Restless leg syndrome, Spinal stenosis in cervical region, Type II or unspecified type diabetes mellitus without mention of complication, not stated as uncontrolled, Urethral caruncle, Wears glasses, and Wears glasses. has a past surgical history that includes Cervical disc surgery (2012); Appendectomy; Tonsillectomy and adenoidectomy (1953); hernia repair (1999); eye surgery (Bilateral, 2017); Cervical spine surgery (5/31/13); laminectomy (05/31/2013); Upper gastrointestinal endoscopy (12/28/2016); Colonoscopy (2009); Colonoscopy (12/29/2016); Colonoscopy (12/30/2016); Colonoscopy (04/25/2017); pr colsc flx w/removal lesion by hot bx forceps (N/A, 4/25/2017); Cystorrhaphy (04/04/2018); pr cystourethroscopy,biopsies (N/A, 4/4/2018); pr [facility] incl fluor gdnce dx w/cell washg spx (N/A, 6/5/2018); Upper gastrointestinal endoscopy (N/A, 8/29/2018); shoulder fracture surgery (Right, 5/28/2019); Hardware Removal (Right, 07/05/2019); and Hardware Removal (Right, 7/5/2019). Restrictions  Restrictions/Precautions  Restrictions/Precautions: General Precautions, Fall Risk  Required Braces or Orthoses?: No  Position Activity Restriction  Other position/activity restrictions: Up with assist, PD  Subjective   General  Chart Reviewed: Yes  Response To Previous Treatment: Patient with no complaints from previous session. Family / Caregiver Present: Yes(, teri)  Subjective  Subjective: Pt states she hurts all over but that is normal.  General Comment  Comments: Pt agreeable for therapy. Orientation  Orientation  Overall Orientation Status: Within Normal Limits  Cognition   Cognition  Overall Cognitive Status: WNL  Cognition Comment: Pt has good awareness of her deficits  Objective   Bed mobility  Comment: Did not assess, pt was sitting up in chair and returned to sitting in chair. Transfers  Sit to Stand: Minimal Assistance  Stand to sit: Minimal Assistance  Comment: Verbal cueing for proper hand placement, increased time to complete. Ambulation  Ambulation?: Yes  Ambulation 1  Surface: level tile  Device: Rolling Walker  Other Apparatus: O2  Assistance: Minimal assistance  Quality of Gait: Increased time to complete turns with max cues for RW sequence. Gait Deviations: Slow Trena; Increased NANCY; Decreased step length;Decreased step height;Deviated path  Distance: 30ft within room  Comments: At end of ambulation, pt stated she didn't feel great requiring a sitting break to catch her breath. SpO2 stayed >90% throughout PT session, although PT did demo some SOB with exertion. Balance  Posture: Fair  Sitting - Static: Good  Sitting - Dynamic: Good  Standing - Static: Fair;+  Standing - Dynamic: Fair;-(BUE support from RW)  Exercises  Gluteal Sets: 20 reps  Knee Long Arc Quad: 20 reps  Ankle Pumps: 20 reps  Comments: seated exercises completed with cues for proper form. Rest breaks taken as needed. OutComes Score  AM-PAC Score  AM-PAC Inpatient Mobility Raw Score : 17 (10/26/20 1219)  AM-PAC Inpatient T-Scale Score : 42.13 (10/26/20 1219)  Mobility Inpatient CMS 0-100% Score: 50.57 (10/26/20 1219)  Mobility Inpatient CMS G-Code Modifier : CK (10/26/20 1219)          Goals  Short term goals  Time Frame for Short term goals: 12 treatments  Short term goal 1: Independent transfers  Short term goal 2:  Independent ambulation w/ ' x 1  Short term goal 3: Tolerate 30 min ther act  Short term goal 4: 1/2 to 1 grade strength increase B LE's  Short term goal 5: Good standing balance  Patient Goals   Patient goals : Feel better consistently    Plan    Plan  Times per week: 1-2x/day,6-7 days/week  Current Treatment Recommendations: Strengthening, Balance Training, Transfer Training, Endurance Training, Gait Training, Safety Education & Training, Functional Mobility Training  Safety Devices  Type of devices:  All fall risk precautions in place, Call light within reach, Chair alarm in place, Gait belt, Left in chair, Nurse notified  Restraints  Initially in place: No     Therapy Time   Individual Concurrent Group Co-treatment   Time In 1128         Time Out 1201         Minutes 433 [facility], PT

## 2020-10-26 NOTE — PLAN OF CARE
Problem: Skin Integrity:  Goal: Will show no infection signs and symptoms  Description: Will show no infection signs and symptoms. Skin assessment completed. Patient repositioned every 2 hours and PRN, risk for pressure ulcer assessed as well as bony prominences. Patient instructed to turn self when applicable. Will continue to monitor. 10/26/2020 1041 by Mable Welch RN  Outcome: Ongoing     Problem: Falls - Risk of:  Goal: Will remain free from falls  Description: Will remain free from falls. Fall risk assessment completed. Patient instructed to use call light. Bed locked and in lowest position, side rails up 2/4, call light and bedside table within reach, clutter removed, and non-skid footwear on when pt out of bed. Bed alarm/chair alarm in use. Hourly rounds will continue. 10/26/2020 1041 by Mable Welch RN  Outcome: Ongoing     Problem: Breathing Pattern - Ineffective:  Goal: Ability to achieve and maintain a regular respiratory rate will improve  Description: Ability to achieve and maintain a regular respiratory rate will improve. Respiratory assessment completed. Patient is able to achieve and maintain regular respiratory rate at this time. Patient oxygen saturation is WDL on 3 liters oxygen via NC. Pt wears 3 L at home 24/7. BIPAP/CPAP PRN and when asleep. Orders include IV steroids and ATB as well as RT tx. Will continue to monitor. Outcome: Ongoing     Problem: Gas Exchange - Impaired:  Goal: Levels of oxygenation will improve  Description: Levels of oxygenation will improve  Outcome: Ongoing     Problem: Pain:  Goal: Control of chronic pain  Description: Control of chronic pain. Pain assessment completed. Patient demonstrates understanding of pain rating scale and interventions. At this time patient states current interventions are sufficient. Will continue to monitor and reassess with each rounding and PRN.        Outcome: Ongoing

## 2020-10-26 NOTE — PROGRESS NOTES
Subjective:    Type 2 diabetes  No Polyuria no polydipsia no hypoglycemia blood sugars reviewed elevated secondary to steroid  ROS  No chills no fever no GI/ complaints except dark urine no dysuria, no cardiac complaints still with shortness of breath with minimal exertion still needing BiPAP periodically, no TIA no bleeding except hematuria no blood in the stools no headache  physical exam  General Appearance: alert and oriented to person, place and time and in no acute distress  Skin: warm and dry, no rash or erythema  Head: normocephalic and atraumatic  Eyes: pupils equal, round, and reactive to light, sclera anicteric and positive pallor  Neck: neck supple and non tender without mass   Pulmonary/Chest: Entry equal decreased at the bases minimal rhonchi no wheezing no use of intercostal muscles cardiovascular: normal rate, regular rhythm, normal S1 and S2, no gallops, intact distal pulses and no carotid bruits  Abdomen: soft, non-tender, non-distended, normal bowel sounds, no masses or organomegaly  Extremities: no edema and pulses no Homans' sign  Neurologic: Alert oriented x3 with no focal deficit    /66   Pulse 63   Temp 97.3 °F (36.3 °C) (Oral)   Resp 18   Ht 4' 9\" (1.448 m)   Wt 190 lb 14.4 oz (86.6 kg)   LMP 04/03/2004 (Within Years)   SpO2 100%   BMI 41.31 kg/m²     CBC:   Lab Results   Component Value Date    WBC 11.3 10/26/2020    RBC 2.88 10/26/2020    HGB 9.3 10/26/2020    HCT 31.6 10/26/2020    .7 10/26/2020    MCH 32.3 10/26/2020    MCHC 29.4 10/26/2020    RDW 15.9 10/26/2020     10/26/2020    MPV 11.0 10/26/2020     BMP:    Lab Results   Component Value Date     10/26/2020    K 4.7 10/26/2020    CL 98 10/26/2020    CO2 35 10/26/2020    BUN 47 10/26/2020    LABALBU 3.8 09/23/2020    LABALBU Detected 11/30/2018    CREATININE 1.73 10/26/2020    CALCIUM 7.4 10/26/2020    GFRAA 35 10/26/2020    LABGLOM 29 10/26/2020    GLUCOSE 260 10/26/2020    GLUCOSE 132 03/07/2012 Assessment:  Patient Active Problem List   Diagnosis    Controlled type 2 diabetes mellitus with stage 3 chronic kidney disease, without long-term current use of insulin (HCC)    Hyperlipidemia    Essential hypertension    Chronic leg pain    Paroxysmal atrial fibrillation (HCC)    Asthma    Gastroesophageal reflux disease without esophagitis    ECTOR on CPAP    Type 2 diabetes mellitus with complication, without long-term current use of insulin ([address]    Spinal stenosis in cervical region    Hypomagnesemia    Hyperphosphaturia    Hypocalcemia    Parkinson's disease ([address]    Acute cystitis with hematuria    Altered mental status    Iron deficiency anemia due to chronic blood loss    Morbid obesity with BMI of 40.0-44.9, adult (HCC)    Hyperplastic polyp of intestine    Diverticulosis    Panlobular emphysema (HCC)    Rapid atrial fibrillation (HCC)    CKD (chronic kidney disease) stage 3, GFR 30-59 ml/min    Anemia in chronic kidney disease    Chronic respiratory failure with hypoxia and hypercapnia (HCC)    Acute kidney injury superimposed on chronic kidney disease ([address]    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 coli UTI (urinary tract infection)    Nephrolithiasis    Candidal intertrigo    Venous insufficiency    Malabsorption    Anemia of chronic renal failure, stage 4 (severe) (HCC)    Iron deficiency    Coronary atherosclerosis    COPD exacerbation (HCC)    Restless leg syndrome    SIRS (systemic inflammatory response syndrome) (HCC)    Nausea and vomiting in adult    Bacterial UTI    Odynophagia    Esophageal dysphagia    Candida esophagitis (HCC)    Sepsis due to urinary tract infection (HCC)    Chronic respiratory failure with hypoxia (HCC)    Chronic diastolic congestive heart failure (HCC)    History of ESBL E. coli infection    Stage 4 chronic kidney disease (HCC)    Fever    Macrocytic anemia    Hypercalcemia    Monoclonal gammopathy of undetermined significance    Acute nonintractable headache    Anemia of chronic renal failure, stage 4 (severe) (HCC)    Traumatic closed displaced fracture of proximal end of right humerus, initial encounter    Urinary tract infection without hematuria    Metabolic encephalopathy    Chest pain    Acute kidney injury ([facility]    Right leg swelling    Hematuria    Chest pain, unspecified    Chronic obstructive pulmonary disease with (acute) exacerbation (HCC)     Acute on chronic respiratory failure with hypoxia hypercapnia  ECTOR on CPAP  COPD exacerbation  CKD 4  Type 2 diabetes with hyperglycemia with renal complication  Essential hypertension  Obesity BMI of 35-39.9  Chronic anticoagulation  Paroxysmal atrial fibrillation  Macrocytic anemia  Parkinson's disease  Hematuria ask urology to see  Plan:    Labs reviewed continue with anticoagulation physical therapy occupational therapy BiPAP as needed bronchodilators empiric antibiotics before meals and at bedtime Accu-Cheks with insulin coverage avoid nephrotoxic drugs  See orders    Bassam Lopez MD  6:42 PM

## 2020-10-26 NOTE — PROGRESS NOTES
Pulmonary Critical Care Progress Note       Patient seen for the follow up of acute on chronic hypoxic/hypercarbic respiratory failure, acute exacerbation of COPD, chronic diastolic heart failure, ECTOR/obesity    Subjective:  She has been on oxygen at 3 L/nasal cannula. She denies worsening shortness of breath. She tolerated BiPAP vision for sleep. She reports having daytime sleepiness with difficulty really getting up in the morning. She denies any chest pain or significant cough. Examination:  Vitals: BP (!) 168/73   Pulse 70   Temp 98.7 °F (37.1 °C) (Oral)   Resp 20   Ht 4' 9\" (1.448 m)   Wt 190 lb 14.4 oz (86.6 kg)   LMP 04/03/2004 (Within Years)   SpO2 100%   BMI 41.31 kg/m²   General appearance:  alert and cooperative with exam  Neck: No JVD  Lungs: decreased breath sounds no crackles   Heart: regular rate and rhythm, S1, S2 normal, no gallop  Abdomen: Soft, non tender, + BS  Extremities: no cyanosis or clubbing.  No significant edema    LABs:  CBC:   Recent Labs     10/25/20  0557 10/26/20  0502   WBC 11.2 11.3   HGB 8.8* 9.3*   HCT 31.0* 31.6*   * 132*     BMP:   Recent Labs     10/25/20  0557 10/26/20  0502    141   K 4.6 4.7   CO2 33* 35*   BUN 44* 47*   CREATININE 1.91* 1.73*   LABGLOM 26* 29*   GLUCOSE 193* 260*     ABG:  Lab Results   Component Value Date    RWX9PEW 40 06/04/2019    FIO2 36.0 06/04/2019       Lab Results   Component Value Date    POCPH 7.50 06/04/2019    POCPCO2 50 06/04/2019    POCPO2 151 06/04/2019    POCHCO3 38.8 06/04/2019    NBEA NOT REPORTED 06/04/2019    PBEA 16 06/04/2019    PBY6CZL 40 06/04/2019    LDHE7HFF 99 06/04/2019    FIO2 36.0 06/04/2019     Radiology:  10/24/20  Right infrahilar opacity appears linear and is likely atelectasis          Impression:  · Acute on chronic hypoxic/hypercarbic respiratory failure  · Acute exacerbation of COPD/former smoker, 30-pack-year history  · Chronic diastolic heart failure  · Obstructive sleep apnea/Obesity, noncompliant on CPAP  · KRISTIN on CKD  · Elevated troponin  · A. fib, anxiety, DM, HLD, HTN,  GERD    Recommendations:  · Oxygen by nasal cannula   · ABG  · BiPAP support for sleep/off home CPAP for now  · Incentive spirometry every hour awake   · Albuterol by nebulizer   · Symbicort   · Spiriva   · continue IV Zithromax empirically  · IV solu-medrol 40 mg every 12 hours  · Chest x-ray BNP  · IV fluids monitor renal function  · Labs: CBC and BMP in am  · on Eliquis   · Bedside physical therapy  · DVT prophylaxis on Eliquis    Angelica De Souza MD   Pulmonary Critical Care and [address] [place] [address]  [phone]

## 2020-10-26 NOTE — CONSULTS
TRANFER CHAIR WALKER OR CANE    Kidney stone 2018    PRESENTLY-SMALL    Living in nursing home     [address] [facility] IS HER OWN LEGAL GUARDIAN    Morbid obesity with BMI of 40.0-44.9, adult ([facility] [address] 12/30/2016    Muscle weakness     Nephrolithiasis 3/8/2018    Open wound     COCCYX    ECTOR on CPAP 2008    USES C-PAP NIGHTLY    Osteoarthritis     OSTEOARTHRITIS    Parkinson disease ([facility] [address] 2015    Restless leg syndrome 2013    MILD    Spinal stenosis in cervical region 6/2/2013    Type II or unspecified type diabetes mellitus without mention of complication, not stated as uncontrolled     Urethral caruncle 3/8/2018    Wears glasses     Wears glasses        Past Surgical History:    Past Surgical History:   Procedure Laterality Date    APPENDECTOMY      CERVICAL [address] SURGERY  2012    CERVICAL SPINE SURGERY  5/31/13    posterior c5-t1    COLONOSCOPY  2009    COLONOSCOPY  12/29/2016    incomplete was not cleaned out    COLONOSCOPY  12/30/2016    COLONOSCOPY  04/25/2017     SIGMOID COLON POLYPECTOMY:  HYPERPLASTIC POLYP ,   DIVERTICULOSIS    CYSTORRHAPHY  04/04/2018    EYE SURGERY Bilateral 2017    CATARACTS W/ IOL    HARDWARE REMOVAL Right 07/05/2019    HARDWARE REMOVAL PINS  HUMERUS     HARDWARE REMOVAL Right 7/5/2019    HARDWARE REMOVAL PINS  HUMERUS  C-ARM performed by Irlanda Ortiz MD at [address] [facility]  05/31/2013    Dr. Monika Dunn N/A 6/5/2018    BRONCHOSCOPY performed by Eleni Cameron MD at [address] N/A 4/25/2017    COLONOSCOPY POLYPECTOMY HOT BIOPSY performed by Jose Desir MD at [facility] 4/4/2018    CYSTOSCOPY performed by Helga Angelucci, MD at [address] Right 5/28/2019    SHOULDER CLOSED REDUCTION PERCUTANEOUS PINNING RIGHT performed by Irlanda Ortiz MD at [facility] TONSILLECTOMY AND ADENOIDECTOMY  1953    UPPER GASTROINTESTINAL ENDOSCOPY  12/28/2016    gastritis, esophagitis,     UPPER GASTROINTESTINAL ENDOSCOPY N/A 8/29/2018    EGD BIOPSY performed by Sanchez Freeman MD at [address] [place] [address]     Previous  surgery: cystoscopy   Medications:    Scheduled Meds:   furosemide  20 mg Intravenous Daily    methylPREDNISolone  40 mg Intravenous Q12H    apixaban  2.5 mg Oral BID    albuterol  2.5 mg Nebulization 4x daily    amiodarone  200 mg Oral Daily    atorvastatin  20 mg Oral Daily    carbidopa-levodopa  1 tablet Oral 4x Daily    budesonide-formoterol  2 puff Inhalation BID    isosorbide mononitrate  30 mg Oral Daily    metoprolol tartrate  25 mg Oral Daily    pantoprazole  40 mg Oral QAM AC    rasagiline  1 mg Oral Daily    rOPINIRole  2 mg Oral TID    tiotropium  2 puff Inhalation Daily    sodium chloride flush  10 mL Intravenous 2 times per day    azithromycin  500 mg Intravenous Q24H    insulin lispro  0-6 Units Subcutaneous TID WC    insulin lispro  0-3 Units Subcutaneous Nightly     Continuous Infusions:   sodium chloride 40 mL/hr at 10/26/20 0251    dextrose       PRN Meds:.melatonin, albuterol, ALPRAZolam, sodium chloride flush, acetaminophen **OR** acetaminophen, polyethylene glycol, promethazine **OR** ondansetron, glucose, dextrose, glucagon (rDNA), dextrose    Allergies:  Dye [barium-containing compounds]; Pcn [penicillins]; and Bactrim [sulfamethoxazole-trimethoprim]    Social History:    Social History     Socioeconomic History    Marital status:      Spouse name: Not on file    Number of children: Not on file    Years of education: Not on file    Highest education level: Not on file   Occupational History    Not on file   Social Needs    Financial resource strain: Not on file    Food insecurity     Worry: Not on file     Inability: Not on file    Transportation needs     Medical: Not on file     Non-medical: Not on file   Tobacco Use    Smoking status: Former Smoker     Packs/day: 2.00     Years: 15.00     Pack years: 30.00     Types: Cigarettes     Last attempt to quit: 10/31/1970     Years since quittin.0    Smokeless tobacco: Never Used   Substance and Sexual Activity    Alcohol use: Not Currently    Drug use: No     Comment: Pt denies use.      Sexual activity: Not on file     Comment: not for the past year d/t illness, denies  concerns/pain   Lifestyle    Physical activity     Days per week: Not on file     Minutes per session: Not on file    Stress: Not on file   Relationships    Social connections     Talks on phone: Not on file     Gets together: Not on file     Attends [employer] service: Not on file     Active member of club or organization: Not on file     Attends meetings of clubs or organizations: Not on file     Relationship status: Not on file    Intimate partner violence     Fear of current or ex partner: Not on file     Emotionally abused: Not on file     Physically abused: Not on file     Forced sexual activity: Not on file   Other Topics Concern    Not on file   Social History Narrative    Not on file       Family History:    Family History   Problem Relation Age of Onset    Heart Failure Mother     Hypertension Mother     Heart Disease Mother     High Blood Pressure Mother      Previous Urologic Family history: none  REVIEW OF SYSTEMS:  Constitutional: negative  Eyes: negative  Respiratory:   COPD shortness of breath  Cardiovascular: See history of present illness  Gastrointestinal: negative  Genitourinary: see HPI  Musculoskeletal: negative  Skin: negative   Neurological: negative  Hematological/Lymphatic: negative  Psychological: negative    Physical Exam:      This a 70 y.o. female   Patient Vitals for the past 24 hrs:   BP Temp Temp src Pulse Resp SpO2 Weight   10/26/20 1543 139/66 97.3 °F (36.3 °C) Oral 63 18 100 % --   10/26/20 1206 (!) 168/73 98.7 °F (37.1 °C) Oral 70 20 100 % --   10/26/20 0748 -- -- -- -- -- 100 % --   10/26/20 0736 (!) 160/83 98.3 °F (36.8 °C) Oral 73 18 100 % --   10/26/20 0504 -- -- -- -- -- -- 190 lb 14.4 oz (86.6 kg)   10/26/20 0341 (!) 159/61 97.3 °F (36.3 °C) Axillary 80 18 97 % --   10/26/20 0300 -- -- -- -- -- 93 % --   10/25/20 2338 (!) 108/58 98.1 °F (36.7 °C) Axillary 66 18 93 % --   10/25/20 2200 -- -- -- -- -- 98 % --   10/25/20 1943 (!) 147/56 97.9 °F (36.6 °C) Oral 75 20 100 % --   10/25/20 1937 -- -- -- -- -- 98 % --     Constitutional: Patient in no acute distress. Neuro: Alert and oriented to person, place and time. Psych: mood and affect normal  HEENT negative  Lungs: Patient with history of COPD presently on nasal oxygen  Cardiovascular: Normal peripheral pulses  Abdomen: Soft, non-tender, non-distended with no CVA, flank pain or hepatosplenomegaly. No hernias. Kidneys normal.  Lymphatics: No palpable lymphadenopathy. Bladder non-tender and not distended.   We will monitor postvoid residual    LABS:   Recent Labs     10/24/20  0540 10/25/20  0557 10/26/20  0502   WBC 10.3 11.2 11.3   HGB 8.9* 8.8* 9.3*   HCT 30.7* 31.0* 31.6*   .3* 111.9* 109.7*   * 137* 132*     Recent Labs     10/24/20  0540 10/25/20  0557 10/26/20  0502    141 141   K 4.7 4.6 4.7   CL 97* 99 98   CO2 35* 33* 35*   BUN 37* 44* 47*   CREATININE 1.87* 1.91* 1.73*       Additional Lab/culture results:    Urinalysis:   Recent Labs     10/26/20  [address] 5.0   WBCUA 2 TO 5   RBCUA TOO NUMEROUS TO COUNT   MUCUS NOT REPORTED   TRICHOMONAS NOT REPORTED   YEAST NOT REPORTED   BACTERIA NOT REPORTED   SPECGRAV 1.020   LEUKOCYTESUR NEGATIVE   UROBILINOGEN Normal   BILIRUBINUR NEGATIVE        -----------------------------------------------------------------  Imaging Results:      Assessment and Plan   Impression:  Gross hematuria acute kidney injury patient on Eliquis  Patient Active Problem List   Diagnosis    Controlled type 2 diabetes mellitus with stage 3 chronic kidney disease, without long-term current use of insulin (HCC)    Hyperlipidemia    Essential hypertension    Chronic leg pain    Paroxysmal atrial fibrillation (HCC)    Asthma    Gastroesophageal reflux disease without esophagitis    ECTOR on CPAP    Type 2 diabetes mellitus with complication, without long-term current use of insulin (HCC)    Spinal stenosis in cervical region    Hypomagnesemia    Hyperphosphaturia    Hypocalcemia    Parkinson's disease ([facility] [address]    Acute cystitis with hematuria    Altered mental status    Iron deficiency anemia due to chronic blood loss    Morbid obesity with BMI of 40.0-44.9, adult (HCC)    Hyperplastic polyp of intestine    Diverticulosis    Panlobular emphysema (HCC)    Rapid atrial fibrillation (HCC)    CKD (chronic kidney disease) stage 3, GFR 30-59 ml/min    Anemia in chronic kidney disease    Chronic respiratory failure with hypoxia and hypercapnia (HCC)    Acute kidney injury superimposed on chronic kidney disease (HCC)    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 coli UTI (urinary tract infection)    Nephrolithiasis    Candidal intertrigo    Venous insufficiency    Malabsorption    Anemia of chronic renal failure, stage 4 (severe) (HCC)    Iron deficiency    Coronary atherosclerosis    COPD exacerbation (HCC)    Restless leg syndrome    SIRS (systemic inflammatory response syndrome) (HCC)    Nausea and vomiting in adult    Bacterial UTI    Odynophagia    Esophageal dysphagia    Candida esophagitis (HCC)    Sepsis due to urinary tract infection (HCC)    Chronic respiratory failure with hypoxia (HCC)    Chronic diastolic congestive heart failure (HCC)    History of ESBL E. coli infection    Stage 4 chronic kidney disease (HCC)    Fever    Macrocytic anemia    Hypercalcemia    Monoclonal gammopathy of undetermined significance    Acute nonintractable headache    Anemia of chronic renal failure, stage 4 (severe) ([facility] [address]    Traumatic closed displaced fracture of proximal end of right humerus, initial encounter    Urinary tract infection without hematuria    Metabolic encephalopathy    Chest pain    Acute kidney injury ([address]    Right leg swelling    Hematuria    Chest pain, unspecified    Chronic obstructive pulmonary disease with (acute) exacerbation ([address]       Plan:  We will plan on repeating cystoscopy to ascertain absence of intravesical pathology    Abel Sanchez  6:06 PM 10/26/2020

## 2020-10-26 NOTE — PROGRESS NOTES
Occupational Therapy    DATE: 10/26/2020    NAME: Johanna Bo  MRN: [id number]   : 1945      [facility]  Occupational Therapy Not Seen Note    Patient not available for Occupational Therapy due to:    [] Testing:    [] Hemodialysis    [] Cancelled by RN:    []Refusal by Patient:    [] Surgery:     [] Intubation:     [] Pain Medication:    [] Sedation:     [] Spine Precautions :    [] Medical Instability:    [x] Other: on bi pap    MICHELLE Daigle/KHANH

## 2020-10-26 NOTE — PROGRESS NOTES
Patients urine is dark, tea colored with red tint. Dr. Edgar Fitch notified and UA with culture ordered. Urine sample collected and sent to lab.

## 2020-10-26 NOTE — PROGRESS NOTES
Speech Language Pathology  Speech Language Pathology  [address]    Dysphagia Treatment Note    Date: 10/26/2020  Patients Name: Denis Ro  MRN: [id number]  Diagnosis:   Patient Active Problem List   Diagnosis Code    Controlled type 2 diabetes mellitus with stage 3 chronic kidney disease, without long-term current use of insulin ([facility] [address] E11.22, N18.30    Hyperlipidemia E78.5    Essential hypertension I10    Chronic leg pain M79.606, G89.29    Paroxysmal atrial fibrillation (HCC) I48.0    Asthma J45.909    Gastroesophageal reflux disease without esophagitis K21.9    ECTOR on CPAP G47.33, Z99.89    Type 2 diabetes mellitus with complication, without long-term current use of insulin ([facility] [address] E11.8    Spinal stenosis in cervical region M48.02    Hypomagnesemia E83.42    Hyperphosphaturia E83.39    Hypocalcemia E83.51    Parkinson's disease ([facility] [address] G20    Acute cystitis with hematuria N30.01    Altered mental status R41.82    Iron deficiency anemia due to chronic blood loss D50.0    Morbid obesity with BMI of 40.0-44.9, adult ([facility]) E66.01, Z68.41    Hyperplastic polyp of intestine K63.5    Diverticulosis K57.90    Panlobular emphysema ([facility]) J43.1    Rapid atrial fibrillation ([facility]) I48.91    CKD (chronic kidney disease) stage 3, GFR 30-59 ml/min N18.30    Anemia in chronic kidney disease N18.9, D63.1    Chronic respiratory failure with hypoxia and hypercapnia ([facility]) J96.11, J96.12    Acute kidney injury superimposed on chronic kidney disease (HCC) N17.9, N18.9    CKD stage 4 due to type 2 diabetes mellitus (HCC) E11.22, N18.4    E. coli UTI (urinary tract infection) N39.0, B96.20    Nephrolithiasis N20.0    Candidal intertrigo B37.2    Venous insufficiency I87.2    Malabsorption K90.9    Anemia of chronic renal failure, stage 4 (severe) ([facility]) N18.4, D63.1    Iron deficiency E61.1    Coronary atherosclerosis I25.10    COPD exacerbation ([facility]) J44.1    Restless leg syndrome G25.81    SIRS (systemic inflammatory response syndrome) ([facility]) R65.10    Nausea and vomiting in adult R11.2    Bacterial UTI N39.0, A49.9    Odynophagia R13.10    Esophageal dysphagia R13.10    Candida esophagitis ([facility]) B37.81    Sepsis due to urinary tract infection ([facility]) A41.9, N39.0    Chronic respiratory failure with hypoxia ([facility]) J96.11    Chronic diastolic congestive heart failure ([facility]) I50.32    History of ESBL E. coli infection Z86.19    Stage 4 chronic kidney disease ([facility]) N18.4    Fever R50.9    Macrocytic anemia D53.9    Hypercalcemia E83.52    Monoclonal gammopathy of undetermined significance D47.2    Acute nonintractable headache R51.9    Anemia of chronic renal failure, stage 4 (severe) ([facility]) N18.4, D63.1    Traumatic closed displaced fracture of proximal end of right humerus, initial encounter S42.201A    Urinary tract infection without hematuria G09.3    Metabolic encephalopathy U53.45    Chest pain R07.9    Acute kidney injury ([facility]) N17.9    Right leg swelling M79.89    Hematuria R31.9    Chest pain, unspecified R07.9    Chronic obstructive pulmonary disease with (acute) exacerbation ([facility]) J44.1       Pain: 0    Dysphagia Treatment  Treatment time: 2:20-2:28    Subjective: [x] Alert [x] Cooperative     [] Confused     [] Agitated    [] Lethargic    Objective/Assessment:    Pt. Seen for diet tolerance monitoring. ST recommended Dysphagia III: Soft and Bite Sized with thin liquids per BSSE on 10/24/2020. This date, Pt given multiple trials of soft solid and thin liquid with no s/s of aspiration. Pt with extended mastication with regular solid, thus regular solid NT. Pt reports receiving ST services for dysphagia at home. ST recommends pt continue with Dysphagia III: soft and bite-sized with thin liquids at this time. Pt educated on safe swallowing strategies with good return. If s/s of aspiration occur, recommend d/c PO intake and complete MBSS.       Plan:  [x]

## 2020-10-26 NOTE — PROGRESS NOTES
Nephrology Progress Note    Anabela Miles MD    Follow Up for (CC) : CKD3/4    ASSESSMENT     CKD stage 4 with baseline GFR of 20s ml/min. Creatinine is at baseline. Hypertension. Anemia. Hypoparathyroidism. History of CHF and COPD. Active Problems:    COPD exacerbation ([address]  Resolved Problems:    * No resolved hospital problems. *    PLAN     Lasix IV as ordered  Monitor GFR electrolytes  Patient is volume overloaded at the current time  Optimization of patient's of pulmonary treatment as per pulmonary service noted  Adjust medication for decreased GFR  Outpatient renal follow-up needed and recommended. Please do not hesitate to call with questions.     SUBJECTIVE      Patient seen and examined at the bedside  Complaining of baseline dyspnea  Pulmonary notes reviewed    Chief Complaint   Patient presents with    Shortness of Breath       Patient Active Problem List   Diagnosis    Controlled type 2 diabetes mellitus with stage 3 chronic kidney disease, without long-term current use of insulin (HCC)    Hyperlipidemia    Essential hypertension    Chronic leg pain    Paroxysmal atrial fibrillation (HCC)    Asthma    Gastroesophageal reflux disease without esophagitis    ECTOR on CPAP    Type 2 diabetes mellitus with complication, without long-term current use of insulin ([address]    Spinal stenosis in cervical region    Hypomagnesemia    Hyperphosphaturia    Hypocalcemia    Parkinson's disease ([address]    Acute cystitis with hematuria    Altered mental status    Iron deficiency anemia due to chronic blood loss    Morbid obesity with BMI of 40.0-44.9, adult (HCC)    Hyperplastic polyp of intestine    Diverticulosis    Panlobular emphysema (HCC)    Rapid atrial fibrillation (HCC)    CKD (chronic kidney disease) stage 3, GFR 30-59 ml/min    Anemia in chronic kidney disease    Chronic respiratory failure with hypoxia and hypercapnia (HCC)    Acute kidney injury superimposed on chronic kidney disease [facility]    CKD stage 4 due to type 2 diabetes mellitus ([facility]    E. coli UTI (urinary tract infection)    Nephrolithiasis    Candidal intertrigo    Venous insufficiency    Malabsorption    Anemia of chronic renal failure, stage 4 (severe) ([facility])    Iron deficiency    Coronary atherosclerosis    COPD exacerbation (HCC)    Restless leg syndrome    SIRS (systemic inflammatory response syndrome) ([facility])    Nausea and vomiting in adult    Bacterial UTI    Odynophagia    Esophageal dysphagia    Candida esophagitis (HCC)    Sepsis due to urinary tract infection ([facility])    Chronic respiratory failure with hypoxia (HCC)    Chronic diastolic congestive heart failure (HCC)    History of ESBL E. coli infection    Stage 4 chronic kidney disease (HCC)    Fever    Macrocytic anemia    Hypercalcemia    Monoclonal gammopathy of undetermined significance    Acute nonintractable headache    Anemia of chronic renal failure, stage 4 (severe) ([facility])    Traumatic closed displaced fracture of proximal end of right humerus, initial encounter    Urinary tract infection without hematuria    Metabolic encephalopathy    Chest pain    Acute kidney injury ([facility]    Right leg swelling    Hematuria    Chest pain, unspecified    Chronic obstructive pulmonary disease with (acute) exacerbation (HCC)       CURRENT MEDICATIONS / Allergies Diana Frederick History / Family History     Current Facility-Administered Medications   Medication Dose Route Frequency Provider Last Rate Last Dose    furosemide (LASIX) injection 20 mg  20 mg Intravenous Daily Bard Ramón MD        methylPREDNISolone sodium (SOLU-MEDROL) injection 40 mg  40 mg Intravenous Q12H ANSHUL Pratt - CNP   40 mg at 10/26/20 0843    apixaban (ELIQUIS) tablet 2.5 mg  2.5 mg Oral BID Balbir Kiser MD   2.5 mg at 10/26/20 0843    melatonin tablet 3 mg  3 mg Oral Nightly PRN Balbir Kiser MD   3 mg at 10/23/20 6791    albuterol (PROVENTIL) nebulizer solution 2.5 mg  2.5 mg Nebulization Q4H PRN Jesus Leal MD   2.5 mg at 10/23/20 0405    albuterol (PROVENTIL) nebulizer solution 2.5 mg  2.5 mg Nebulization 4x daily Jesus Leal MD   2.5 mg at 10/26/20 1430    0.9 % sodium chloride infusion   Intravenous Continuous Napolean MD Daysi 40 mL/hr at 10/26/20 0251      ALPRAZolam (XANAX) tablet 0.25 mg  0.25 mg Oral BID PRN Jesus Leal MD   0.25 mg at 10/26/20 0859    amiodarone (CORDARONE) tablet 200 mg  200 mg Oral Daily Jesus Leal MD   200 mg at 10/26/20 0844    atorvastatin (LIPITOR) tablet 20 mg  20 mg Oral Daily Jesus Leal MD   20 mg at 10/26/20 0844    carbidopa-levodopa (SINEMET CR)  MG per extended release tablet 1 tablet  1 tablet Oral 4x Daily Jesus Leal MD   1 tablet at 10/26/20 1309    budesonide-formoterol (SYMBICORT) 160-4.5 MCG/ACT inhaler 2 puff  2 puff Inhalation BID Jesus Leal MD   2 puff at 10/26/20 0748    isosorbide mononitrate (IMDUR) extended release tablet 30 mg  30 mg Oral Daily Jesus Leal MD   30 mg at 10/26/20 0843    metoprolol tartrate (LOPRESSOR) tablet 25 mg  25 mg Oral Daily Jesus Leal MD   25 mg at 10/26/20 0843    pantoprazole (PROTONIX) tablet 40 mg  40 mg Oral QAM AC Jesus Leal MD   40 mg at 10/26/20 0502    rasagiline (AZILECT) tablet 1 mg  1 mg Oral Daily Jesus Leal MD   1 mg at 10/26/20 0843    rOPINIRole (REQUIP) tablet 2 mg  2 mg Oral TID Jesus Leal MD   2 mg at 10/26/20 1309    tiotropium (SPIRIVA RESPIMAT) 2.5 MCG/ACT inhaler 2 puff  2 puff Inhalation Daily Jesus Leal MD   2 puff at 10/26/20 0748    sodium chloride flush 0.9 % injection 10 mL  10 mL Intravenous 2 times per day Jesus Leal MD   10 mL at 10/26/20 0844    sodium chloride flush 0.9 % injection 10 mL  10 mL Intravenous PRN Jesus Leal MD        acetaminophen (TYLENOL) tablet 650 mg  650 mg Oral Q6H PRN Jesus Leal MD   650 mg at 10/26/20 0858    Or    acetaminophen (TYLENOL) suppository 650 mg  650 mg Rectal Q6H PRN Kendy Sue MD        polyethylene glycol (GLYCOLAX) packet 17 g  17 g Oral Daily PRN Kendy Sue MD        promethazine (PHENERGAN) tablet 12.5 mg  12.5 mg Oral Q6H PRN Kendy Sue MD        Or    ondansetron [facility]) injection 4 mg  4 mg Intravenous Q6H PRN Kendy Sue MD   4 mg at 10/25/20 2216    azithromycin (ZITHROMAX) 500 mg in D5W 250ml addavial  500 mg Intravenous Q24H Kendy Sue MD   Stopped at 10/25/20 2246    glucose (GLUTOSE) 40 % oral gel 15 g  15 g Oral PRN Kendy Sue MD        dextrose 50 % IV solution  12.5 g Intravenous PRN Kendy Sue MD        glucagon (rDNA) injection 1 mg  1 mg Intramuscular PRN Kendy Sue MD        dextrose 5 % solution  100 mL/hr Intravenous PRN Kendy Sue MD        insulin lispro (HUMALOG) injection vial 0-6 Units  0-6 Units Subcutaneous TID WC Kendy Sue MD   2 Units at 10/26/20 1158    insulin lispro (HUMALOG) injection vial 0-3 Units  0-3 Units Subcutaneous Nightly Kendy Sue MD   2 Units at 10/25/20 2031          Allergies   Allergen Reactions    Dye [Barium-Containing Compounds] Other (See Comments)     Cause Afib per     Pcn [Penicillins] Itching and Swelling    Bactrim [Sulfamethoxazole-Trimethoprim] Other (See Comments)     Allergic Nephritis       Social History     Socioeconomic History    Marital status:      Spouse name: Not on file    Number of children: Not on file    Years of education: Not on file    Highest education level: Not on file   Occupational History    Not on file   Social Needs    Financial resource strain: Not on file    Food insecurity     Worry: Not on file     Inability: Not on file    Transportation needs     Medical: Not on file     Non-medical: Not on file   Tobacco Use    Smoking status: Former Smoker     Packs/day: 2.00     Years: 15.00     Pack years: 30. 00     Types: Cigarettes     Last attempt to quit: 10/31/1970     Years since quittin.0    Smokeless tobacco: Never Used   Substance and Sexual Activity    Alcohol use: Not Currently    Drug use: No     Comment: Pt denies use.  Sexual activity: Not on file     Comment: not for the past year d/t illness, denies  concerns/pain   Lifestyle    Physical activity     Days per week: Not on file     Minutes per session: Not on file    Stress: Not on file   Relationships    Social connections     Talks on phone: Not on file     Gets together: Not on file     Attends Latter day service: Not on file     Active member of club or organization: Not on file     Attends meetings of clubs or organizations: Not on file     Relationship status: Not on file    Intimate partner violence     Fear of current or ex partner: Not on file     Emotionally abused: Not on file     Physically abused: Not on file     Forced sexual activity: Not on file   Other Topics Concern    Not on file   Social History Narrative    Not on file       Family History   Problem Relation Age of Onset    Heart Failure Mother     Hypertension Mother     Heart Disease Mother     High Blood Pressure Mother        REVIEW OF SYSTEMS     All other ROS is negative. OBJECTIVE      Vitals:    10/26/20 1206   BP: (!) 168/73   Pulse: 70   Resp: 20   Temp: 98.7 °F (37.1 °C)   SpO2: 100%     Wt Readings from Last 3 Encounters:   10/26/20 190 lb 14.4 oz (86.6 kg)   20 170 lb (77.1 kg)   20 173 lb 1.6 oz (78.5 kg)     I/O last 3 completed shifts: In: 0756 [P.O.:330; I.V.:1262]  Out: 750 [Urine:750]  Body mass index is 41.31 kg/m². PHYSICAL EXAM      GENERAL APPEARANCE:Awake, alert, in no acute distress  SKIN: warm and dry, no rash or erythema  EYES: conjunctivae normal and sclera anicteric  NECK:  JVD Absent. PULMONARY: Symmetrical and decreased breath sounds BL (diminished air exchange). CADRDIOVASCULAR: S1 and S2 audible. ABDOMEN: soft nontender, bowel sounds present. EXTREMITIES: BL edema +    LABS      Data:    CBC:   Recent Labs     10/24/20  0540 10/25/20  0557 10/26/20  0502   WBC 10.3 11.2 11.3   HGB 8.9* 8.8* 9.3*   HCT 30.7* 31.0* 31.6*   .3* 111.9* 109.7*   * 137* 132*     BMP:    Recent Labs     10/24/20  0540 10/25/20  0557 10/26/20  0502    141 141   K 4.7 4.6 4.7   CL 97* 99 98   CO2 35* 33* 35*   BUN 37* 44* 47*   CREATININE 1.87* 1.91* 1.73*   GLUCOSE 164* 193* 260*     CMP:   Recent Labs     10/24/20  0540 10/25/20  0557 10/26/20  0502    141 141   K 4.7 4.6 4.7   CL 97* 99 98   CO2 35* 33* 35*   BUN 37* 44* 47*   CREATININE 1.87* 1.91* 1.73*   GLUCOSE 164* 193* 260*   CALCIUM 7.9* 7.4* 7.4*                     URINALYSIS/URINE CHEMISTRIES      URINE SODIUM:    Lab Results   Component Value Date    EDWARD 46 03/04/2020      URINE OSMOLARITY:  No results found for: OSMOU  URINE CREATININE:    Lab Results   Component Value Date    LABCREA 79.1 03/04/2020     URINE EOSINOPHILS: No components found for: EOSU  URINALYSIS:  U/A:     RADIOLOGY      Results for orders placed during the hospital encounter of 02/28/20   US RENAL COMPLETE    Narrative EXAMINATION:  RETROPERITONEAL ULTRASOUND OF THE KIDNEYS AND URINARY BLADDER    3/3/2020    COMPARISON:  CT renal stone protocol from 02/12/2020    HISTORY:  ORDERING SYSTEM PROVIDED HISTORY: increased creatinine  TECHNOLOGIST PROVIDED HISTORY:  increased creatinine    42-year-old female with elevated creatinine    FINDINGS:    Kidneys:    Exam limited due to patient's inability to hold breath. Right kidney measures 8.7 x 4.3 x 4.4 cm. Right renal cortical thickness  measures 1.1 cm. Left kidney measures 10.2 x 4.5 x 4.3 cm. Left renal cortical thickness  measures 1.1 cm.     Echogenic foci with acoustic shadowing are seen in the bilateral kidneys  likely representing bilateral renal calculi and/or renal vascular  calcifications with the largest in the medial right renal hilum measuring 1.3  cm. No hydronephrosis or perinephric fluid. Gross preservation of the corticomedullary differentiation. Gallstones and gallbladder sludge. Bladder:    Patient voided immediately prior to the exam.  Small postvoid residual.      Impression 1. Bilateral renal calculi and/or renal vascular calcifications. No  hydronephrosis. 2. Gallstones and gallbladder sludge. 3. Small postvoid residual.  4. Exam limited due to patient's inability to breath hold. [address] [place] [address] [facility].   Ph: [phone]

## 2020-10-26 NOTE — PROGRESS NOTES
RN notified of potential wounds to bilateral posterior thighs. Patient was on bedside commode (yesterday?) and her legs were pinched by the lid. Upon assessment there are two small bruises with excoriation to the middle of each. No bleeding. Pt denies any pain. Barrier cream applied. Will continue to monitor.

## 2020-10-27 NOTE — PLAN OF CARE
Problem: Skin Integrity:  Goal: Will show no infection signs and symptoms  Description: Will show no infection signs and symptoms. Skin assessment completed. Patient repositioned every 2 hours and PRN, risk for pressure ulcer assessed as well as bony prominences. Patient instructed to turn self when applicable. Will continue to monitor. 10/27/2020 1717 by Glenn Zavala RN  Outcome: Ongoing     Problem: Falls - Risk of:  Goal: Will remain free from falls  Description: Will remain free from falls. Fall risk assessment completed. Patient instructed to use call light. Bed locked and in lowest position, side rails up 2/4, call light and bedside table within reach, clutter removed, and non-skid footwear on when pt out of bed. Bed and chair alarms in use. Hourly rounds will continue. 10/27/2020 1717 by Glenn Zavala RN  Outcome: Ongoing     Problem: Gas Exchange - Impaired:  Goal: Levels of oxygenation will improve  Description: Levels of oxygenation will improve.    10/27/2020 1717 by Glenn Zavala RN  Outcome: Ongoing

## 2020-10-27 NOTE — PROGRESS NOTES
Physical Therapy  Facility/Department: [facility] PROGRESSIVE CARE  Daily Treatment Note  NAME: Sharon Fischer  : 1945  MRN: [id number]    Date of Service: 10/27/2020    Discharge Recommendations:  Home with Home health PT, 24 hour supervision or assist       RN reports patient is medically stable for therapy treatment this date. Chart reviewed prior to treatment and patient is agreeable for therapy. All lines intact and patient positioned comfortably at end of treatment. All patient needs addressed prior to ending therapy session. Assessment   Body structures, Functions, Activity limitations: Decreased strength;Decreased endurance;Decreased balance  Assessment: Pt tolerated PT session well, limited by fatigue and endurance this date. Demo'd SOB after gait training with no drop in SpO2. Prognosis: Good  Clinical Presentation: evolving  PT Education: Transfer Training;PT Role;Energy Conservation; Functional Mobility Training;Plan of Care;Gait Training;General Safety  REQUIRES PT FOLLOW UP: Yes  Activity Tolerance  Activity Tolerance: Patient Tolerated treatment well;Patient limited by fatigue;Patient limited by endurance     Patient Diagnosis(es): The encounter diagnosis was COPD exacerbation ([address]      has a past medical history of Acute on chronic diastolic congestive heart failure ([address] Anesthesia complication, Asthma, Atrial fibrillation ([address] Cataracts, bilateral, Cellulitis, Cerebral artery occlusion with cerebral infarction ([address] CHF (congestive heart failure) ([address] Chronic kidney disease, Chronic kidney disease, Closed fracture of proximal end of right humerus with routine healing, Constipation, Controlled type 2 diabetes mellitus with stage 3 chronic kidney disease, without long-term current use of insulin ([address] COPD (chronic obstructive pulmonary disease) ([address] Difficult intravenous access, Difficulty walking, Diverticulosis, DM2 (diabetes mellitus, type 2) ([address] Full dentures, GERD (gastroesophageal reflux disease), H/O fall, Headache(784.0), [place] (hard of hearing), Hyperlipidemia, Hyperplastic polyp of intestine, Hypertension, Impaired ambulation, Kidney stone, Living in nursing home, Morbid obesity with BMI of 40.0-44.9, adult ([facility] Muscle weakness, Nephrolithiasis, Open wound, ECTOR on CPAP, Osteoarthritis, Parkinson disease ([facility] Restless leg syndrome, Spinal stenosis in cervical region, Type II or unspecified type diabetes mellitus without mention of complication, not stated as uncontrolled, Urethral caruncle, Wears glasses, and Wears glasses. has a past surgical history that includes Cervical disc surgery (2012); Appendectomy; Tonsillectomy and adenoidectomy (1953); hernia repair (1999); eye surgery (Bilateral, 2017); Cervical spine surgery (5/31/13); laminectomy (05/31/2013); Upper gastrointestinal endoscopy (12/28/2016); Colonoscopy (2009); Colonoscopy (12/29/2016); Colonoscopy (12/30/2016); Colonoscopy (04/25/2017); pr colsc flx w/removal lesion by hot bx forceps (N/A, 4/25/2017); Cystorrhaphy (04/04/2018); pr cystourethroscopy,biopsies (N/A, 4/4/2018); pr [facility] incl fluor gdnce dx w/cell washg spx (N/A, 6/5/2018); Upper gastrointestinal endoscopy (N/A, 8/29/2018); shoulder fracture surgery (Right, 5/28/2019); Hardware Removal (Right, 07/05/2019); and Hardware Removal (Right, 7/5/2019). Restrictions  Restrictions/Precautions  Restrictions/Precautions: Fall Risk, Up as Tolerated, Contact Precautions  Required Braces or Orthoses?: No  Position Activity Restriction  Other position/activity restrictions: Up with assist, PD, RUE IV, O2, fluid restriction  Subjective   General  Chart Reviewed: Yes  Response To Previous Treatment: Patient with no complaints from previous session. Family / Caregiver Present: No  Subjective  Subjective: Pt is agreeable to PT but says she is having a bad day.           Cognition   Cognition  Overall Cognitive Status: [facility]  Cognition Comment: Pt has good awareness of her deficits  Objective   Bed mobility  Comment: Not assessed, pt up in chair and returned to chair. Transfers  Sit to Stand: Minimal Assistance  Stand to sit: Minimal Assistance  Comment: Verbal cueing for proper hand placement, increased time to complete. Ambulation  Ambulation?: Yes  Ambulation 1  Surface: level tile  Device: Rolling Walker  Other Apparatus: O2  Assistance: Minimal assistance  Quality of Gait: Increased time to complete turns with max cues for RW sequence. Gait Deviations: Slow Trena; Increased NANCY; Decreased step length;Decreased step height;Deviated path  Distance: 30ft within room  Comments: Pt demo'ing some SOB after ambulation, requiring a sitting break. Balance  Posture: Fair  Sitting - Static: Good  Sitting - Dynamic: Good  Standing - Static: Fair;+  Standing - Dynamic: Fair;-(BUE support from RW)  Exercises  Gluteal Sets: 20 reps  Hip Flexion: 20 reps  Knee Long Arc Quad: 20 reps  Ankle Pumps: 20 reps  Comments: Postural exercises completed while sitting unsupported in chair. OutComes Score  AM-PAC Score  AM-PAC Inpatient Mobility Raw Score : 17 (10/27/20 [facility])  -PAC Inpatient T-Scale Score : 42.13 (10/27/20 [facility])  Mobility Inpatient CMS 0-100% Score: 50.57 (10/27/20 [facility])  Mobility Inpatient CMS G-Code Modifier : CK (10/27/20 [facility])          Goals  Short term goals  Time Frame for Short term goals: 12 treatments  Short term goal 1: Independent transfers  Short term goal 2:  Independent ambulation w/ ' x 1  Short term goal 3: Tolerate 30 min ther act  Short term goal 4: 1/2 to 1 grade strength increase B LE's  Short term goal 5: Good standing balance  Patient Goals   Patient goals : Feel better consistently    Plan    Plan  Times per week: 1-2x/day,6-7 days/week  Current Treatment Recommendations: Strengthening, Balance Training, Transfer Training, Endurance Training, Gait Training, Safety Education & Training, Functional Mobility Training  Safety Devices  Type of devices:  All fall risk precautions in place, Call light within reach, Chair alarm in place, Gait belt, Left in chair, Nurse notified  Restraints  Initially in place: No     Therapy Time   Individual Concurrent Group Co-treatment   Time In 1310         Time Out 1335         Minutes 25                 Amrit Barnes, PT

## 2020-10-27 NOTE — PLAN OF CARE
Problem: Falls - Risk of:  Goal: Will remain free from falls  Description: Will remain free from falls  Outcome: Ongoing  Goal: Absence of physical injury  Description: Absence of physical injury  Outcome: Ongoing     Problem: Skin Integrity:  Goal: Will show no infection signs and symptoms  Description: Will show no infection signs and symptoms  Outcome: Ongoing  Goal: Absence of new skin breakdown  Description: Absence of new skin breakdown  Outcome: Ongoing     Problem: Breathing Pattern - Ineffective:  Goal: Ability to achieve and maintain a regular respiratory rate will improve  Description: Ability to achieve and maintain a regular respiratory rate will improve  Outcome: Ongoing     Problem: Airway Clearance - Ineffective:  Goal: Ability to maintain a clear airway will improve  Description: Ability to maintain a clear airway will improve  Outcome: Ongoing

## 2020-10-27 NOTE — PROGRESS NOTES
Occupational Therapy  Facility/Department: [facility] PROGRESSIVE CARE  Daily Treatment Note  NAME: Matthew White  : 1945  MRN: [id number]    Date of Service: 10/27/2020    Discharge Recommendations:  24 hour supervision or assist, Home with Home health OT       Assessment   Performance deficits / Impairments: Decreased functional mobility ; Decreased balance;Decreased ADL status; Decreased high-level IADLs;Decreased ROM; Decreased strength;Decreased sensation  Prognosis: Fair  OT Education: OT Role;Energy Conservation;Plan of Care;Transfer Training;ADL Adaptive Strategies  REQUIRES OT FOLLOW UP: Yes  Activity Tolerance  Activity Tolerance: Patient limited by fatigue;Patient Tolerated treatment well  Activity Tolerance: Pt is motivated but has limited activity tolerance  Safety Devices  Safety Devices in place: Yes  Type of devices: Nurse notified; Left in chair;Call light within reach; Chair alarm in place;Gait belt; All fall risk precautions in place; Patient at risk for falls         Patient Diagnosis(es): The encounter diagnosis was COPD exacerbation ([address]       has a past medical history of Acute on chronic diastolic congestive heart failure ([address] Anesthesia complication, Asthma, Atrial fibrillation ([address] Cataracts, bilateral, Cellulitis, Cerebral artery occlusion with cerebral infarction ([address] CHF (congestive heart failure) ([address] Chronic kidney disease, Chronic kidney disease, Closed fracture of proximal end of right humerus with routine healing, Constipation, Controlled type 2 diabetes mellitus with stage 3 chronic kidney disease, without long-term current use of insulin ([address] COPD (chronic obstructive pulmonary disease) ([address] Difficult intravenous access, Difficulty walking, Diverticulosis, DM2 (diabetes mellitus, type 2) ([address] Full dentures, GERD (gastroesophageal reflux disease), H/O fall, Headache(784.0), [place] (hard of hearing), Hyperlipidemia, Hyperplastic polyp of intestine, Hypertension, Impaired ambulation, Kidney stone, Living in nursing home, Morbid obesity with BMI of 40.0-44.9, adult ([facility] Muscle weakness, Nephrolithiasis, Open wound, ECTOR on CPAP, Osteoarthritis, Parkinson disease ([facility] Restless leg syndrome, Spinal stenosis in cervical region, Type II or unspecified type diabetes mellitus without mention of complication, not stated as uncontrolled, Urethral caruncle, Wears glasses, and Wears glasses. has a past surgical history that includes Cervical disc surgery (2012); Appendectomy; Tonsillectomy and adenoidectomy (1953); hernia repair (1999); eye surgery (Bilateral, 2017); Cervical spine surgery (5/31/13); laminectomy (05/31/2013); Upper gastrointestinal endoscopy (12/28/2016); Colonoscopy (2009); Colonoscopy (12/29/2016); Colonoscopy (12/30/2016); Colonoscopy (04/25/2017); pr colsc flx w/removal lesion by hot bx forceps (N/A, 4/25/2017); Cystorrhaphy (04/04/2018); pr cystourethroscopy,biopsies (N/A, 4/4/2018); pr [facility] incl fluor gdnce dx w/cell washg spx (N/A, 6/5/2018); Upper gastrointestinal endoscopy (N/A, 8/29/2018); shoulder fracture surgery (Right, 5/28/2019); Hardware Removal (Right, 07/05/2019); and Hardware Removal (Right, 7/5/2019). Restrictions  Restrictions/Precautions  Restrictions/Precautions: Fall Risk, Up as Tolerated, Contact Precautions  Required Braces or Orthoses?: No  Position Activity Restriction  Other position/activity restrictions:  Up with assist, PD, RUE IV, O2, fluid restriction  Subjective   General  Chart Reviewed: Yes  Patient assessed for rehabilitation services?: Yes  Additional Pertinent Hx: h/o PD  Response to previous treatment: Patient with no complaints from previous session  Family / Caregiver Present: No      Orientation  Orientation  Overall Orientation Status: Within Functional Limits  Objective    ADL  Toileting: Maximum assistance(Max A for boris hygiene and to don/doff breif)  Additional Comments: Pt is limited by decreased activity tolerance and Frame for Short term goals: By d/c, pt will  Short term goal 1: demo Min A for ADL transfers with good technique  Short term goal 2: demo Mod A x1 functional mob at room distance with good safety/pacing and no LOB with RW for support  Short term goal 3: demo Min A for full body bathing/dressing with use of AD/DME as needed  Short term goal 4: demo and verb good understanding of all educ provided on fall prevention, EC/WS, possible equip needs and UB HEP  Patient Goals   Patient goals : Return home and resume therapy with Marcio [address]   Time In 1444         Time Out 1500         Minutes 16           Upon writer exit, call light within reach, pt retired to chair. All lines intact and patient positioned comfortably. Chair alarm in place. All patient needs addressed prior to ending therapy session. Chart reviewed prior to treatment and patient is agreeable for therapy. RN reports patient is medically stable for therapy treatment this date.       Claudeen Sees, COTA/KHANH

## 2020-10-27 NOTE — PROGRESS NOTES
Subjective:     Follow-up type 2 diabetes  No polyuria no polydipsia no hypoglycemia blood sugars reviewed  ROS  Fever no chills no GI/ complaints no cardiac complaints less shortness of breath less wheezing less tachypnea still using BiPAP still with oxygen, no TIA no bleeding no headache no sore throat no skin lesions,  physical exam  General Appearance: alert and oriented to person, place and time and in no acute distress  Skin: warm and dry, no rash or erythema  Head: normocephalic and atraumatic  Eyes: pupils equal, round, and reactive to light, conjunctivae normal and sclera anicteric  Neck: neck supple and non tender without mass   Pulmonary/Chest: Air entry equal decreased at the bases few rhonchi's of intercostal muscle cardiovascular: normal rate, regular rhythm, normal S1 and S2, no gallops, intact distal pulses and no carotid bruits  Abdomen: soft, non-tender, non-distended, normal bowel sounds, no masses or organomegaly  Extremities: Mild edema good pulses negative Homans' sign  Neurologic: Alert oriented x3 with no focal deficit    BP (!) 136/56   Pulse 87   Temp 97.5 °F (36.4 °C) (Oral)   Resp 20   Ht 4' 9\" (1.448 m)   Wt 192 lb 5 oz (87.2 kg)   LMP 04/03/2004 (Within Years)   SpO2 100%   BMI 41.62 kg/m²     CBC:   Lab Results   Component Value Date    WBC 11.3 10/26/2020    RBC 2.88 10/26/2020    HGB 9.3 10/26/2020    HCT 31.6 10/26/2020    .7 10/26/2020    MCH 32.3 10/26/2020    MCHC 29.4 10/26/2020    RDW 15.9 10/26/2020     10/26/2020    MPV 11.0 10/26/2020     BMP:    Lab Results   Component Value Date     10/27/2020    K 4.3 10/27/2020    CL 96 10/27/2020    CO2 36 10/27/2020    BUN 49 10/27/2020    LABALBU 3.8 09/23/2020    LABALBU Detected 11/30/2018    CREATININE 1.80 10/27/2020    CALCIUM 7.4 10/27/2020    GFRAA 33 10/27/2020    LABGLOM 28 10/27/2020    GLUCOSE 237 10/27/2020    GLUCOSE 132 03/07/2012        Assessment:  Patient Active Problem List   Diagnosis  Controlled type 2 diabetes mellitus with stage 3 chronic kidney disease, without long-term current use of insulin (HCC)    Hyperlipidemia    Essential hypertension    Chronic leg pain    Paroxysmal atrial fibrillation (HCC)    Asthma    Gastroesophageal reflux disease without esophagitis    ECTOR on CPAP    Type 2 diabetes mellitus with complication, without long-term current use of insulin (HCC)    Spinal stenosis in cervical region    Hypomagnesemia    Hyperphosphaturia    Hypocalcemia    Parkinson's disease ([address]    Acute cystitis with hematuria    Altered mental status    Iron deficiency anemia due to chronic blood loss    Morbid obesity with BMI of 40.0-44.9, adult (HCC)    Hyperplastic polyp of intestine    Diverticulosis    Panlobular emphysema (HCC)    Rapid atrial fibrillation (HCC)    CKD (chronic kidney disease) stage 3, GFR 30-59 ml/min    Anemia in chronic kidney disease    Chronic respiratory failure with hypoxia and hypercapnia (HCC)    Acute kidney injury superimposed on chronic kidney disease (HCC)    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 coli UTI (urinary tract infection)    Nephrolithiasis    Candidal intertrigo    Venous insufficiency    Malabsorption    Anemia of chronic renal failure, stage 4 (severe) (HCC)    Iron deficiency    Coronary atherosclerosis    COPD exacerbation (HCC)    Restless leg syndrome    SIRS (systemic inflammatory response syndrome) (HCC)    Nausea and vomiting in adult    Bacterial UTI    Odynophagia    Esophageal dysphagia    Candida esophagitis (HCC)    Sepsis due to urinary tract infection (HCC)    Chronic respiratory failure with hypoxia (HCC)    Chronic diastolic congestive heart failure (HCC)    History of ESBL E. coli infection    Stage 4 chronic kidney disease (HCC)    Fever    Macrocytic anemia    Hypercalcemia    Monoclonal gammopathy of undetermined significance    Acute nonintractable headache    Anemia of chronic renal failure, stage 4 (severe) (HCC)    Traumatic closed displaced fracture of proximal end of right humerus, initial encounter    Urinary tract infection without hematuria    Metabolic encephalopathy    Chest pain    Acute kidney injury ([address]    Right leg swelling    Hematuria    Chest pain, unspecified    Chronic obstructive pulmonary disease with (acute) exacerbation (HCC)     Acute on chronic respiratory failure with hypoxia hypercapnia  ECTOR on CPAP  COPD exacerbation  CKD 4  Type 2 diabetes with hyperglycemia and renal complications  Essential hypertension  Obesity BMI 35-39.9  Paroxysmal atrial fibrillation  Chronic anticoagulation  Hematuria  Parkinson's disease  Plan:    Labs reviewed continue with anticoagulation physical therapy occupational therapy continue with oxygen BiPAP as needed, before meals and at bedtime Accu-Cheks with insulin coverage, avoid nephrotoxic drugs see orders    Jennifer Weiss MD  6:44 PM

## 2020-10-27 NOTE — PROGRESS NOTES
Pulmonary Critical Care Progress Note       Patient seen for the follow up of acute on chronic hypoxic/hypercarbic respiratory failure, acute exacerbation of COPD, chronic diastolic heart failure, ECTOR/obesity    Subjective:  She has been on oxygen at 3 L/nasal cannula. She denies worsening shortness of breath. She tolerated BiPAP vision for sleep. She reports having daytime sleepiness she falls asleep sitting in the chair. .  She denies any chest pain or significant cough. Examination:  Vitals: BP (!) 136/56   Pulse 87   Temp 97.5 °F (36.4 °C) (Oral)   Resp 20   Ht 4' 9\" (1.448 m)   Wt 192 lb 5 oz (87.2 kg)   LMP 04/03/2004 (Within Years)   SpO2 100%   BMI 41.62 kg/m²   General appearance:  alert and cooperative with exam  Neck: No JVD  Lungs: decreased breath sounds no crackles   Heart: regular rate and rhythm, S1, S2 normal, no gallop  Abdomen: Soft, non tender, + BS  Extremities: no cyanosis or clubbing. No significant edema    LABs:  CBC:   Recent Labs     10/25/20  0557 10/26/20  0502   WBC 11.2 11.3   HGB 8.8* 9.3*   HCT 31.0* 31.6*   * 132*     BMP:   Recent Labs     10/26/20  0502 10/27/20  1305    140   K 4.7 4.3   CO2 35* 36*   BUN 47* 49*   CREATININE 1.73* 1.80*   LABGLOM 29* 28*   GLUCOSE 260* 237*   Results for Johnson Mora (MRN [id number]) as of 10/27/2020 17:03   Ref. Range 10/22/2020 16:30 10/22/2020 18:30 10/26/2020 05:02   Pro-BNP Latest Ref Range: <300 pg/mL 749 (H)  1,881 (H)     ABG:  Lab Results   Component Value Date    UVI0LVN 35 10/26/2020    FIO2 32.0 10/26/2020       Lab Results   Component Value Date    POCPH 7.31 10/26/2020    POCPCO2 66 10/26/2020    POCPO2 103 10/26/2020    POCHCO3 33.2 10/26/2020    NBEA NOT REPORTED 10/26/2020    PBEA 7 10/26/2020    JYK5YNC 35 10/26/2020    AXLL1CQA 97 10/26/2020    FIO2 32.0 10/26/2020     Radiology:  Chest x-ray 10/27  Small left pleural effusion.  Adjacent linear opacity in the left lung base,    likely atelectasis. 10/24/20  Right infrahilar opacity appears linear and is likely atelectasis          Impression:  · Acute on chronic hypoxic/hypercarbic respiratory failure  · Acute exacerbation of COPD/former smoker, 30-pack-year history  · Mild pulmonary edema/chronic diastolic heart failure  · Obstructive sleep apnea/Obesity, noncompliant on CPAP  · KRISTIN on CKD  · Elevated troponin  · A. fib, anxiety, DM, HLD, HTN,  GERD    Recommendations:  · Oxygen by nasal cannula   · ABG  · BiPAP support for sleep/off home CPAP for now; consider trilogy   · Incentive spirometry every hour awake   · Albuterol by nebulizer   · Symbicort   · Spiriva   · continue IV Zithromax empirically  · IV solu-medrol 40 mg every 12 hours  · Start Provigil 100 daily  · Monitor renal function/hold IV fluids per nephrology  · on Eliquis   · Bedside physical therapy  · DVT prophylaxis on Eliquis    Juanita Keith MD   Pulmonary Critical Care and [address] [place] [address]  [phone]

## 2020-10-27 NOTE — PROGRESS NOTES
Morbid obesity with BMI of 40.0-44.9, adult (HCC)    Hyperplastic polyp of intestine    Diverticulosis    Panlobular emphysema (HCC)    Rapid atrial fibrillation (HCC)    CKD (chronic kidney disease) stage 3, GFR 30-59 ml/min    Anemia in chronic kidney disease    Chronic respiratory failure with hypoxia and hypercapnia (HCC)    Acute kidney injury superimposed on chronic kidney disease ([address]    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 coli UTI (urinary tract infection)    Nephrolithiasis    Candidal intertrigo    Venous insufficiency    Malabsorption    Anemia of chronic renal failure, stage 4 (severe) (HCC)    Iron deficiency    Coronary atherosclerosis    COPD exacerbation (HCC)    Restless leg syndrome    SIRS (systemic inflammatory response syndrome) (HCC)    Nausea and vomiting in adult    Bacterial UTI    Odynophagia    Esophageal dysphagia    Candida esophagitis (HCC)    Sepsis due to urinary tract infection (HCC)    Chronic respiratory failure with hypoxia (HCC)    Chronic diastolic congestive heart failure (HCC)    History of ESBL E. coli infection    Stage 4 chronic kidney disease (HCC)    Fever    Macrocytic anemia    Hypercalcemia    Monoclonal gammopathy of undetermined significance    Acute nonintractable headache    Anemia of chronic renal failure, stage 4 (severe) (HCC)    Traumatic closed displaced fracture of proximal end of right humerus, initial encounter    Urinary tract infection without hematuria    Metabolic encephalopathy    Chest pain    Acute kidney injury ([address]    Right leg swelling    Hematuria    Chest pain, unspecified    Chronic obstructive pulmonary disease with (acute) exacerbation (HCC)       CURRENT MEDICATIONS / Allergies Comfort Lester History / Family History     Current Facility-Administered Medications   Medication Dose Route Frequency Provider Last Rate Last Dose    furosemide (LASIX) injection 20 mg  20 mg Intravenous Daily Marianna Bobby MD   20 mg at 10/27/20 0843    methylPREDNISolone sodium (SOLU-MEDROL) injection 40 mg  40 mg Intravenous Q12H Paulina Primrose, APRN - CNP   40 mg at 10/27/20 1127    apixaban (ELIQUIS) tablet 2.5 mg  2.5 mg Oral BID Staci Santos MD   2.5 mg at 10/27/20 0842    melatonin tablet 3 mg  3 mg Oral Nightly PRN Staci Santos MD   3 mg at 10/27/20 0242    albuterol (PROVENTIL) nebulizer solution 2.5 mg  2.5 mg Nebulization Q4H PRN Staci Santos MD   2.5 mg at 10/23/20 0405    albuterol (PROVENTIL) nebulizer solution 2.5 mg  2.5 mg Nebulization 4x daily Staci Santos MD   2.5 mg at 10/27/20 1043    0.9 % sodium chloride infusion   Intravenous Continuous Herbert Butler MD 40 mL/hr at 10/27/20 0529      ALPRAZolam (XANAX) tablet 0.25 mg  0.25 mg Oral BID PRN Staci Santos MD   0.25 mg at 10/27/20 0854    amiodarone (CORDARONE) tablet 200 mg  200 mg Oral Daily Staci Santos MD   200 mg at 10/27/20 0843    atorvastatin (LIPITOR) tablet 20 mg  20 mg Oral Daily Staci Santos MD   20 mg at 10/27/20 0843    carbidopa-levodopa (SINEMET CR)  MG per extended release tablet 1 tablet  1 tablet Oral 4x Daily Staci Santos MD   1 tablet at 10/27/20 0843    budesonide-formoterol (SYMBICORT) 160-4.5 MCG/ACT inhaler 2 puff  2 puff Inhalation BID Staci Santos MD   2 puff at 10/27/20 0612    isosorbide mononitrate (IMDUR) extended release tablet 30 mg  30 mg Oral Daily Staci Santos MD   30 mg at 10/27/20 0842    metoprolol tartrate (LOPRESSOR) tablet 25 mg  25 mg Oral Daily Staci Santos MD   25 mg at 10/27/20 0843    pantoprazole (PROTONIX) tablet 40 mg  40 mg Oral QAM AC Staci Santos MD   40 mg at 10/27/20 0606    rasagiline (AZILECT) tablet 1 mg  1 mg Oral Daily Staci Santos MD   1 mg at 10/27/20 0842    rOPINIRole (REQUIP) tablet 2 mg  2 mg Oral TID Staci Santos MD   2 mg at 10/27/20 0842    tiotropium (SPIRIVA RESPIMAT) 2.5 MCG/ACT inhaler 2 puff 2 puff Inhalation Daily Bassam Lopez MD   2 puff at 10/27/20 0612    sodium chloride flush 0.9 % injection 10 mL  10 mL Intravenous 2 times per day Bassam Lopez MD   10 mL at 10/26/20 0844    sodium chloride flush 0.9 % injection 10 mL  10 mL Intravenous PRN Bassam Lopez MD        acetaminophen (TYLENOL) tablet 650 mg  650 mg Oral Q6H PRN Bassam Lopez MD   650 mg at 10/27/20 0854    Or    acetaminophen (TYLENOL) suppository 650 mg  650 mg Rectal Q6H PRN Bassam Lopez MD        polyethylene glycol (GLYCOLAX) packet 17 g  17 g Oral Daily PRN Bassam Lopez MD        promethazine (PHENERGAN) tablet 12.5 mg  12.5 mg Oral Q6H PRN Bassam Lopez MD        Or    ondansetron [facility]) injection 4 mg  4 mg Intravenous Q6H PRN Bassam Lopez MD   4 mg at 10/26/20 2214    azithromycin (ZITHROMAX) 500 mg in D5W 250ml addavial  500 mg Intravenous Q24H Bassam Lopez MD   Stopped at 10/26/20 2220    glucose (GLUTOSE) 40 % oral gel 15 g  15 g Oral PRN Bassam Lopez MD        dextrose 50 % IV solution  12.5 g Intravenous PRN Bassam Lopez MD        glucagon (rDNA) injection 1 mg  1 mg Intramuscular PRN Bassam Lopez MD        dextrose 5 % solution  100 mL/hr Intravenous PRN Bassam Lopez MD        insulin lispro (HUMALOG) injection vial 0-6 Units  0-6 Units Subcutaneous TID WC Bassam Lopez MD   2 Units at 10/27/20 0844    insulin lispro (HUMALOG) injection vial 0-3 Units  0-3 Units Subcutaneous Nightly Bassam Lopez MD   1 Units at 10/26/20 2039          Allergies   Allergen Reactions    Dye [Barium-Containing Compounds] Other (See Comments)     Cause Afib per     Pcn [Penicillins] Itching and Swelling    Bactrim [Sulfamethoxazole-Trimethoprim] Other (See Comments)     Allergic Nephritis       Social History     Socioeconomic History    Marital status:      Spouse name: Not on file    Number of children: Not on file    Years of education: Not on file    Highest education level: Not on file   Occupational History    Not on file   Social Needs    Financial resource strain: Not on file    Food insecurity     Worry: Not on file     Inability: Not on file    Transportation needs     Medical: Not on file     Non-medical: Not on file   Tobacco Use    Smoking status: Former Smoker     Packs/day: 2.00     Years: 15.00     Pack years: 30.00     Types: Cigarettes     Last attempt to quit: 10/31/1970     Years since quittin.0    Smokeless tobacco: Never Used   Substance and Sexual Activity    Alcohol use: Not Currently    Drug use: No     Comment: Pt denies use.  Sexual activity: Not on file     Comment: not for the past year d/t illness, denies  concerns/pain   Lifestyle    Physical activity     Days per week: Not on file     Minutes per session: Not on file    Stress: Not on file   Relationships    Social connections     Talks on phone: Not on file     Gets together: Not on file     Attends Taoism service: Not on file     Active member of club or organization: Not on file     Attends meetings of clubs or organizations: Not on file     Relationship status: Not on file    Intimate partner violence     Fear of current or ex partner: Not on file     Emotionally abused: Not on file     Physically abused: Not on file     Forced sexual activity: Not on file   Other Topics Concern    Not on file   Social History Narrative    Not on file       Family History   Problem Relation Age of Onset    Heart Failure Mother     Hypertension Mother     Heart Disease Mother     High Blood Pressure Mother        REVIEW OF SYSTEMS     All other ROS is negative. OBJECTIVE      Vitals:    10/27/20 1203   BP: (!) 147/62   Pulse: 82   Resp: 18   Temp: 98 °F (36.7 °C)   SpO2: 100%     Wt Readings from Last 3 Encounters:   10/27/20 192 lb 5 oz (87.2 kg)   20 170 lb (77.1 kg)   20 173 lb 1.6 oz (78.5 kg)     I/O last 3 completed shifts:   In:  [P.O.:838; I.V.:1131]  Out: 1450 [Urine:1450]  Body mass index is 41.62 kg/m². PHYSICAL EXAM      GENERAL APPEARANCE:Awake, alert, in no acute distress  SKIN: warm and dry, no rash or erythema  EYES: conjunctivae normal and sclera anicteric  NECK:  JVD Absent. PULMONARY: Symmetrical and decreased breath sounds BL, few wheezes. CADRDIOVASCULAR: S1 and S2 audible. ABDOMEN: soft nontender, bowel sounds present. EXTREMITIES: BL edema + R>L. LABS      Data:    CBC:   Recent Labs     10/25/20  0557 10/26/20  0502   WBC 11.2 11.3   HGB 8.8* 9.3*   HCT 31.0* 31.6*   .9* 109.7*   * 132*     BMP:    Recent Labs     10/25/20  0557 10/26/20  0502    141   K 4.6 4.7   CL 99 98   CO2 33* 35*   BUN 44* 47*   CREATININE 1.91* 1.73*   GLUCOSE 193* 260*     CMP:   Recent Labs     10/25/20  0557 10/26/20  0502    141   K 4.6 4.7   CL 99 98   CO2 33* 35*   BUN 44* 47*   CREATININE 1.91* 1.73*   GLUCOSE 193* 260*   CALCIUM 7.4* 7.4*                     URINALYSIS/URINE CHEMISTRIES      URINE SODIUM:    Lab Results   Component Value Date    EDWARD 46 03/04/2020      URINE OSMOLARITY:  No results found for: OSMOU  URINE CREATININE:    Lab Results   Component Value Date    LABCREA 79.1 03/04/2020     URINE EOSINOPHILS: No components found for: EOSU  URINALYSIS:  U/A:     RADIOLOGY      Results for orders placed during the hospital encounter of 02/28/20   US RENAL COMPLETE    Narrative EXAMINATION:  RETROPERITONEAL ULTRASOUND OF THE KIDNEYS AND URINARY BLADDER    3/3/2020    COMPARISON:  CT renal stone protocol from 02/12/2020    HISTORY:  ORDERING SYSTEM PROVIDED HISTORY: increased creatinine  TECHNOLOGIST PROVIDED HISTORY:  increased creatinine    72-year-old female with elevated creatinine    FINDINGS:    Kidneys:    Exam limited due to patient's inability to hold breath. Right kidney measures 8.7 x 4.3 x 4.4 cm. Right renal cortical thickness  measures 1.1 cm. Left kidney measures 10.2 x 4.5 x 4.3 cm. Left renal cortical thickness  measures 1.1 cm. Echogenic foci with acoustic shadowing are seen in the bilateral kidneys  likely representing bilateral renal calculi and/or renal vascular  calcifications with the largest in the medial right renal hilum measuring 1.3  cm. No hydronephrosis or perinephric fluid. Gross preservation of the corticomedullary differentiation. Gallstones and gallbladder sludge. Bladder:    Patient voided immediately prior to the exam.  Small postvoid residual.      Impression 1. Bilateral renal calculi and/or renal vascular calcifications. No  hydronephrosis. 2. Gallstones and gallbladder sludge. 3. Small postvoid residual.  4. Exam limited due to patient's inability to breath hold. [address], [address] [facility].   Ph: [phone]

## 2020-10-28 NOTE — PLAN OF CARE
Problem: Skin Integrity:  Goal: Will show no infection signs and symptoms  Description: Will show no infection signs and symptoms  10/28/2020 1322 by Wilner Dickens RN  Outcome: Ongoing     Problem: Falls - Risk of:  Goal: Will remain free from falls  Description: Will remain free from falls  10/28/2020 1322 by Wilner Dickens RN  Outcome: Ongoing     Problem: Discharge Planning:  Goal: Discharged to appropriate level of care  Description: Discharged to appropriate level of care  Outcome: Ongoing     Problem: Breathing Pattern - Ineffective:  Goal: Ability to achieve and maintain a regular respiratory rate will improve  Description: Ability to achieve and maintain a regular respiratory rate will improve  10/28/2020 1322 by Wilner Dickens RN  Outcome: Ongoing

## 2020-10-28 NOTE — OP NOTE
Operative Note      Patient: Delvin Mistry  YOB: 1945  MRN: [id number]    Date of Procedure: 10/28/2020    Pre-Op Diagnosis: gross hematuria    Post-Op Diagnosis: Same and Right renal hematuria, right renal calculi       Procedure(s):  CYSTOSCOPY    Surgeon(s):  Camilla Hernandez MD    Assistant:   * No surgical staff found *    Anesthesia: Local    Estimated Blood Loss (mL): Minimal    Complications: None    Specimens:   ID Type Source Tests Collected by Time Destination   1 : CYSTO URINE Urine Bladder FISH, UROVYSION, URINE RT REFLEX TO CULTURE Camilla Hernandez MD 10/28/2020 1022        Implants:  * No implants in log *      Drains:   External Urinary Catheter (Active)       Indications: This patient is 70years old, she has had hematuria associated with anticoagulation therapy. A CT abdomen and pelvis was performed without contrast, demonstrated cluster of stones in the right renal pelvis measuring 12 mm, also other right renal calculi    2 nonobstructing stones in the lower pole of the left kidney    Detailed Description of Procedure:   Patient was brought to the operating room, positioned in dorsolithotomy, proper patient identification procedure identification prepping and draping in the usual sterile manner. We entered the bladder with the cystoscope, we examined the bladder carefully, the bladder showed no tumors and no evidence of hemorrhagic cystitis    We examined the trigone and monitor the ureteral orifices. The left ureteral orifices effluxing clear urine.     The right ureteral orifice was also monitored and it was evident that gross hematuria was effluxing from the right ureteral orifice    The remainder of the examination was unremarkable    At the completion the bladder was emptied the scope removed the patient returned to recovery room in stable condition    Impression, cluster of right renal calculi in the renal pelvis, right renal hematuria    Recommendation the patient may

## 2020-10-28 NOTE — PROGRESS NOTES
Nephrology Progress Note    Murray Hernandez MD       Follow Up for (CC) : CKD3/4  SUBJECTIVE    CT scan was remarkable for bilateral kidney stones  Creatinine showed a slight increase to 1.89  Feels better in general    ASSESSMENT     CKD stage 4 with baseline GFR of 20s ml/min. Creatinine is at baseline. Hypertension. Anemia. Hypoparathyroidism. History of CHF and COPD. Hypomagnesemia    Active Problems:    COPD exacerbation (HCC)  Resolved Problems:    * No resolved hospital problems. *    PLAN   Continue Lasix IV  20mg BID. We will start Aranesp  Magnesium replaced  Monitor GFR electrolytes  Optimization of patient's of pulmonary treatment as per pulmonary service noted  Adjust medication for decreased GFR  Urology has been consulted for hematuria. Outpatient renal follow-up needed and recommended. Please do not hesitate to call with questions.     Chief Complaint   Patient presents with    Shortness of Breath       Patient Active Problem List   Diagnosis    Controlled type 2 diabetes mellitus with stage 3 chronic kidney disease, without long-term current use of insulin (HCC)    Hyperlipidemia    Essential hypertension    Chronic leg pain    Paroxysmal atrial fibrillation (HCC)    Asthma    Gastroesophageal reflux disease without esophagitis    ECTOR on CPAP    Type 2 diabetes mellitus with complication, without long-term current use of insulin ([address]    Spinal stenosis in cervical region    Hypomagnesemia    Hyperphosphaturia    Hypocalcemia    Parkinson's disease ([address]    Acute cystitis with hematuria    Altered mental status    Iron deficiency anemia due to chronic blood loss    Morbid obesity with BMI of 40.0-44.9, adult (HCC)    Hyperplastic polyp of intestine    Diverticulosis    Panlobular emphysema (HCC)    Rapid atrial fibrillation (HCC)    CKD (chronic kidney disease) stage 3, GFR 30-59 ml/min    Anemia in chronic kidney disease    Chronic respiratory tablet 650 mg  650 mg Oral Q6H PRN Myrna Cruz MD   650 mg at 10/27/20 2330    Or    acetaminophen (TYLENOL) suppository 650 mg  650 mg Rectal Q6H PRN Myrna Cruz MD        polyethylene glycol [facility]) packet 17 g  17 g Oral Daily PRN Myrna Cruz MD        [facility]) tablet 12.5 mg  12.5 mg Oral Q6H PRN Myrna Cruz MD        Or    ondansetron [facility] injection 4 mg  4 mg Intravenous Q6H PRN Myrna Cruz MD   4 mg at 10/26/20 2214    azithromycin (ZITHROMAX) 500 mg in D5W 250ml addavial  500 mg Intravenous Q24H Myrna Cruz MD   Stopped at 10/27/20 2248    glucose (GLUTOSE) 40 % oral gel 15 g  15 g Oral PRN Myrna Cruz MD        dextrose 50 % IV solution  12.5 g Intravenous PRN Myrna Cruz MD        glucagon (rDNA) injection 1 mg  1 mg Intramuscular PRN Myrna Cruz MD        dextrose 5 % solution  100 mL/hr Intravenous PRN Myrna Cruz MD        insulin lispro (HUMALOG) injection vial 0-6 Units  0-6 Units Subcutaneous TID WC Myrna Cruz MD   2 Units at 10/28/20 1259    insulin lispro (HUMALOG) injection vial 0-3 Units  0-3 Units Subcutaneous Nightly Myrna Cruz MD   2 Units at 10/27/20 2149          Allergies   Allergen Reactions    Dye [Barium-Containing Compounds] Other (See Comments)     Cause Afib per     Pcn [Penicillins] Itching and Swelling    Bactrim [Sulfamethoxazole-Trimethoprim] Other (See Comments)     Allergic Nephritis       Social History     Socioeconomic History    Marital status:      Spouse name: Not on file    Number of children: Not on file    Years of education: Not on file    Highest education level: Not on file   Occupational History    Not on file   Social Needs    Financial resource strain: Not on file    Food insecurity     Worry: Not on file     Inability: Not on file    Transportation needs     Medical: Not on file     Non-medical: Not on file   Tobacco Use    Smoking status: Former Smoker wheezes. CADRDIOVASCULAR: S1 and S2 audible. ABDOMEN: soft nontender, bowel sounds present. EXTREMITIES: BL edema + R>L. LABS      Data:    CBC:   Recent Labs     10/26/20  0502 10/28/20  0516   WBC 11.3 11.5*   HGB 9.3* 8.8*   HCT 31.6* 30.0*   .7* 108.3*   * 111*     BMP:    Recent Labs     10/26/20  0502 10/27/20  1305 10/28/20  0516    140 139   K 4.7 4.3 4.8   CL 98 96* 94*   CO2 35* 36* 37*   BUN 47* 49* 50*   CREATININE 1.73* 1.80* 1.89*   GLUCOSE 260* 237* 271*     CMP:   Recent Labs     10/26/20  0502 10/27/20  1305 10/28/20  0516    140 139   K 4.7 4.3 4.8   CL 98 96* 94*   CO2 35* 36* 37*   BUN 47* 49* 50*   CREATININE 1.73* 1.80* 1.89*   GLUCOSE 260* 237* 271*   CALCIUM 7.4* 7.4* 6.9*                     URINALYSIS/URINE CHEMISTRIES      URINE SODIUM:    Lab Results   Component Value Date    EDWARD 46 03/04/2020      URINE OSMOLARITY:  No results found for: OSMOU  URINE CREATININE:    Lab Results   Component Value Date    LABCREA 79.1 03/04/2020     URINE EOSINOPHILS: No components found for: EOSU  URINALYSIS:  U/A:     RADIOLOGY      Results for orders placed during the hospital encounter of 02/28/20   US RENAL COMPLETE    Narrative EXAMINATION:  RETROPERITONEAL ULTRASOUND OF THE KIDNEYS AND URINARY BLADDER    3/3/2020    COMPARISON:  CT renal stone protocol from 02/12/2020    HISTORY:  ORDERING SYSTEM PROVIDED HISTORY: increased creatinine  TECHNOLOGIST PROVIDED HISTORY:  increased creatinine    59-year-old female with elevated creatinine    FINDINGS:    Kidneys:    Exam limited due to patient's inability to hold breath. Right kidney measures 8.7 x 4.3 x 4.4 cm. Right renal cortical thickness  measures 1.1 cm. Left kidney measures 10.2 x 4.5 x 4.3 cm. Left renal cortical thickness  measures 1.1 cm.     Echogenic foci with acoustic shadowing are seen in the bilateral kidneys  likely representing bilateral renal calculi and/or renal vascular  calcifications with the largest in the medial right renal hilum measuring 1.3  cm. No hydronephrosis or perinephric fluid. Gross preservation of the corticomedullary differentiation. Gallstones and gallbladder sludge. Bladder:    Patient voided immediately prior to the exam.  Small postvoid residual.      Impression 1. Bilateral renal calculi and/or renal vascular calcifications. No  hydronephrosis. 2. Gallstones and gallbladder sludge. 3. Small postvoid residual.  4. Exam limited due to patient's inability to breath hold. [place] [id number] [facility].   Ph: [phone]

## 2020-10-28 NOTE — PROGRESS NOTES
Pulmonary Critical Care Progress Note  Gena Nelson, ANSHUL-JOSE/Molina Perez MD     Patient seen for the follow up of acute on chronic hypoxic/hypercarbic respiratory failure, acute exacerbation of COPD, chronic diastolic heart failure, ECTOR/obesity    Subjective:  She is resting comfortably in bed, in no distress. She had cystoscopy earlier today. Her shortness of breath is improved. She denies chest pain. She has occasional nonproductive cough. She has been wearing BiPAP at night. Examination:  Vitals: BP (!) 133/54   Pulse 94   Temp 98.3 °F (36.8 °C) (Oral)   Resp 18   Ht 4' 9\" (1.448 m)   Wt 188 lb 12.8 oz (85.6 kg)   LMP 04/03/2004 (Within Years)   SpO2 100%   BMI 40.86 kg/m²   General appearance:  alert and cooperative with exam, resting in bed  Neck: No JVD  Lungs: Decreased air exchange, no wheezing at this time  Heart: regular rate and rhythm, S1, S2 normal, no gallop  Abdomen: Soft, non tender, + BS  Extremities: no cyanosis or clubbing.  No significant edema    LABs:  CBC:   Recent Labs     10/26/20  0502 10/28/20  0516   WBC 11.3 11.5*   HGB 9.3* 8.8*   HCT 31.6* 30.0*   * 111*     BMP:   Recent Labs     10/27/20  1305 10/28/20  0516    139   K 4.3 4.8   CO2 36* 37*   BUN 49* 50*   CREATININE 1.80* 1.89*   LABGLOM 28* 26*   GLUCOSE 237* 271*     ABG:  Lab Results   Component Value Date    FMH4HVS 35 10/26/2020    FIO2 32.0 10/26/2020       Lab Results   Component Value Date    POCPH 7.31 10/26/2020    POCPCO2 66 10/26/2020    POCPO2 103 10/26/2020    POCHCO3 33.2 10/26/2020    NBEA NOT REPORTED 10/26/2020    PBEA 7 10/26/2020    DVC6QPY 35 10/26/2020    HDPU7GVR 97 10/26/2020    FIO2 32.0 10/26/2020     Radiology:  10/26/20      Impression:  · Acute on chronic hypoxic/hypercarbic respiratory failure  · Acute exacerbation of COPD/former smoker, 30-pack-year history  · Chronic diastolic heart failure  · Obstructive sleep apnea/Obesity, noncompliant on CPAP  · KRISTIN on

## 2020-10-28 NOTE — PROGRESS NOTES
Writer phoned MD regarding AM labs which showed Magnesium of 1.6. Orders received for 1g of IV replacement. See MAR for detail.

## 2020-10-28 NOTE — PROGRESS NOTES
Occupational Therapy  Facility/Department: [facility]  Daily Treatment Note  NAME: Ariel Mayberry  : 1945  MRN: [id number]    Date of Service: 10/28/2020    Discharge Recommendations:  24 hour supervision or assist, Home with Home health OT       Assessment   Performance deficits / Impairments: Decreased functional mobility ; Decreased balance;Decreased ADL status; Decreased high-level IADLs;Decreased ROM; Decreased strength;Decreased sensation  Assessment: Pt would benefit from skilled OT while IP with continued [facility] OT at d/c. At baseline, pt requires 24 hr s/a d/t multiple dx affecting balance and activity tolerance  Prognosis: Fair  OT Education: OT Role;Energy Conservation;Plan of Care;Transfer Training;ADL Adaptive Strategies;IADL Safety  REQUIRES OT FOLLOW UP: Yes  Activity Tolerance  Activity Tolerance: Patient Tolerated treatment well  Safety Devices  Safety Devices in place: Yes  Type of devices: Nurse notified;Call light within reach;Gait belt; All fall risk precautions in place; Patient at risk for falls; Left in bed(Left in surgical bed for patient to be taken to procedure)         Patient Diagnosis(es): The encounter diagnosis was COPD exacerbation ([address]       has a past medical history of Acute on chronic diastolic congestive heart failure ([address] Anesthesia complication, Asthma, Atrial fibrillation ([address] Cataracts, bilateral, Cellulitis, Cerebral artery occlusion with cerebral infarction ([address] CHF (congestive heart failure) ([address] Chronic kidney disease, Chronic kidney disease, Closed fracture of proximal end of right humerus with routine healing, Constipation, Controlled type 2 diabetes mellitus with stage 3 chronic kidney disease, without long-term current use of insulin ([address] COPD (chronic obstructive pulmonary disease) ([address] Difficult intravenous access, Difficulty walking, Diverticulosis, DM2 (diabetes mellitus, type 2) ([address] Full dentures, GERD (gastroesophageal reflux disease), H/O fall, Headache(784.0), [place] (hard of hearing), Hyperlipidemia, Hyperplastic polyp of intestine, Hypertension, Impaired ambulation, Kidney stone, Living in nursing home, Morbid obesity with BMI of 40.0-44.9, adult ([facility] Muscle weakness, Nephrolithiasis, Open wound, ECTOR on CPAP, Osteoarthritis, Parkinson disease ([facility] Restless leg syndrome, Spinal stenosis in cervical region, Type II or unspecified type diabetes mellitus without mention of complication, not stated as uncontrolled, Urethral caruncle, Wears glasses, and Wears glasses. has a past surgical history that includes Cervical disc surgery (2012); Appendectomy; Tonsillectomy and adenoidectomy (1953); hernia repair (1999); eye surgery (Bilateral, 2017); Cervical spine surgery (5/31/13); laminectomy (05/31/2013); Upper gastrointestinal endoscopy (12/28/2016); Colonoscopy (2009); Colonoscopy (12/29/2016); Colonoscopy (12/30/2016); Colonoscopy (04/25/2017); pr colsc flx w/removal lesion by hot bx forceps (N/A, 4/25/2017); Cystorrhaphy (04/04/2018); pr cystourethroscopy,biopsies (N/A, 4/4/2018); pr [facility] incl fluor gdnce dx w/cell washg spx (N/A, 6/5/2018); Upper gastrointestinal endoscopy (N/A, 8/29/2018); shoulder fracture surgery (Right, 5/28/2019); Hardware Removal (Right, 07/05/2019); and Hardware Removal (Right, 7/5/2019). Restrictions  Restrictions/Precautions  Restrictions/Precautions: Fall Risk, Up as Tolerated, Contact Precautions  Required Braces or Orthoses?: No  Position Activity Restriction  Other position/activity restrictions:  Up with assist, PD, RUE IV, O2, fluid restriction  Subjective   General  Chart Reviewed: Yes  Patient assessed for rehabilitation services?: Yes  Additional Pertinent Hx: h/o PD  Response to previous treatment: Patient with no complaints from previous session  Family / Caregiver Present: No      Orientation  Orientation  Overall Orientation Status: Within Functional Limits  Objective    ADL  UE Bathing: Minimal assistance(to wash back)  LE Bathing: Moderate assistance(to wash below B knee)  UE Dressing: Minimal assistance(to don/doff gown with line mgt)  LE Dressing: Maximum assistance(to don/doff B socks and slip on shoes)  Additional Comments: Patient completed shower this date with assistance to hold detachable shower head and to rinse body. Patient is able to wash hair, UB, and 50% LB with set up and verbal cues        Balance  Sitting Balance: Contact guard assistance  Standing Balance: Contact guard assistance    Functional Mobility  Functional - Mobility Device: Rolling Walker  Activity: To/from bathroom; Other  Assist Level: Minimal assistance  Functional Mobility Comments: Patient completed functional mobility to/from bathroom and from bathroom>door with Min A and RW and verbal cues for environment scanning and especially during turns. Writer managed all lines    Shower Transfers  Shower - Transfer From: Francisca Krause - Transfer Type: To and From  Shower - Transfer To: Transfer tub bench  Shower - Technique: Ambulating  Shower Transfers: Contact Guard  Shower Transfers Comments: verbal cues for sequencing/technique    Bed mobility  Sit to Supine:  Moderate assistance  Scootin Person assistance;Maximal assistance  Comment: Patient up in chair upon arrival, Patient required Mod A to lay in sugery bed to bring BLE's up and MAX A x2 for scooting up in bed    Transfers  Sit to stand: Contact guard assistance  Stand to sit: Contact guard assistance      Cognition  Overall Cognitive Status: WNL      Plan   Plan  Times per week: 4-5x/week  Times per day: Daily  Current Treatment Recommendations: Strengthening, Endurance Training, Self-Care / ADL, Safety Education & Training, Functional Mobility Training, Balance Training  AM-PAC Score     AM-PAC Inpatient Mobility without Stair Climbing Raw Score : 11 (10/28/20 1027)  AM-PAC Inpatient without Stair Climbing T-Scale Score : 35.66 (10/28/20 1027)  Mobility Inpatient CMS 0-100% Score: 67.36 (10/28/20 1027)  Mobility Inpatient without Stair CMS G-Code Modifier : CL (10/28/20 1027)  AM-PAC Inpatient Daily Activity Raw Score: 16 (10/28/20 1027)  AM-PAC Inpatient ADL T-Scale Score : 35.96 (10/28/20 1027)  ADL Inpatient CMS 0-100% Score: 53.32 (10/28/20 1027)  ADL Inpatient CMS G-Code Modifier : CK (10/28/20 1027)    Goals  Short term goals  Time Frame for Short term goals: By d/c, pt will  Short term goal 1: demo Min A for ADL transfers with good technique  Short term goal 2: demo Mod A x1 functional mob at room distance with good safety/pacing and no LOB with RW for support  Short term goal 3: demo Min A for full body bathing/dressing with use of AD/DME as needed  Short term goal 4: demo and verb good understanding of all educ provided on fall prevention, EC/WS, possible equip needs and UB HEP  Patient Goals   Patient goals : Return home and resume therapy with [facility]       Therapy Time   Individual Concurrent Group Co-treatment   Time In [facility] 9         Time Out [address]         Minutes 29           RN reports patient is medically stable for therapy treatment this date. Chart reviewed prior to treatment and patient is agreeable for therapy. All lines intact and patient positioned comfortably at end of treatment. All patient needs addressed prior to ending therapy session.         ELIZABETH Hathaway

## 2020-10-28 NOTE — PROGRESS NOTES
DATE: 10/28/2020    NAME: Celia Restrepo  MRN: [id number]   : 1945    Patient not seen this date for Physical Therapy due to:  [] Blood transfusion in progress  [] Cancel by RN  [] Hemodialysis  []  Refusal by Patient   [] Spine Precautions   [] Strict Bedrest  [x] Surgery (cystoscopy)  [] Testing      [] Other        [] PT being discontinued at this time. Patient independent. No further needs. [] PT being discontinued at this time as the patient has been transferred to hospice care. No further needs.     NORI CHRISTIE, PTA

## 2020-10-28 NOTE — PLAN OF CARE
Problem: Falls - Risk of:  Goal: Will remain free from falls  Description: Will remain free from falls  10/28/2020 0336 by Julia Giron RN  Outcome: Ongoing    Problem: Falls - Risk of:  Goal: Absence of physical injury  Description: Absence of physical injury  Outcome: Ongoing     Problem: Skin Integrity:  Goal: Will show no infection signs and symptoms  Description: Will show no infection signs and symptoms  10/28/2020 0336 by Julia Giron RN  Outcome: Ongoing     Problem: Gas Exchange - Impaired:  Goal: Levels of oxygenation will improve  Description: Levels of oxygenation will improve    Outcome: Ongoing

## 2020-10-28 NOTE — PROGRESS NOTES
Subjective:     Follow-up type 2 diabetes  No polyuria no polydipsia no hypoglycemia blood sugars reviewed  ROS  No fever no chills no GI or  complaints except hematuria no cardiac complaints still with shortness of breath with exertion no PND no orthopnea, no TIA headache no sore throat no skin lesions  physical exam  General Appearance: alert and oriented to person, place and time and in no acute distress  Skin: warm and dry, no rash or erythema  Head: normocephalic and atraumatic  Eyes: pupils equal, round, and reactive to light and sclera anicteric  Neck: neck supple and non tender without mass   Pulmonary/Chest: Air entry equal prolonged expiratory phase few rhonchi no crackles cardiovascular: normal rate, regular rhythm, normal S1 and S2, no gallops, intact distal pulses and no carotid bruits  Abdomen: soft, non-tender, non-distended, normal bowel sounds, no masses or organomegaly  Extremities: no edema and pulses no Homans' sign  Neurologic: Alert oriented x3 no focal deficit    BP (!) 130/59   Pulse 73   Temp 98.1 °F (36.7 °C) (Oral)   Resp 14   Ht 4' 9\" (1.448 m)   Wt 188 lb 12.8 oz (85.6 kg)   LMP 04/03/2004 (Within Years)   SpO2 99%   BMI 40.86 kg/m²     CBC:   Lab Results   Component Value Date    WBC 11.5 10/28/2020    RBC 2.77 10/28/2020    HGB 8.8 10/28/2020    HCT 30.0 10/28/2020    .3 10/28/2020    MCH 31.8 10/28/2020    MCHC 29.3 10/28/2020    RDW 15.7 10/28/2020     10/28/2020    MPV 11.0 10/28/2020     CMP:    Lab Results   Component Value Date     10/28/2020    K 4.8 10/28/2020    CL 94 10/28/2020    CO2 37 10/28/2020    BUN 50 10/28/2020    CREATININE 1.89 10/28/2020    GFRAA 32 10/28/2020    LABGLOM 26 10/28/2020    GLUCOSE 271 10/28/2020    GLUCOSE 132 03/07/2012    PROT 6.0 09/23/2020    LABALBU 3.8 09/23/2020    LABALBU Detected 11/30/2018    CALCIUM 6.9 10/28/2020    BILITOT 0.18 09/23/2020    ALKPHOS 57 09/23/2020    AST 11 09/23/2020    ALT <5 09/23/2020 Assessment:  Patient Active Problem List   Diagnosis    Controlled type 2 diabetes mellitus with stage 3 chronic kidney disease, without long-term current use of insulin (HCC)    Hyperlipidemia    Essential hypertension    Chronic leg pain    Paroxysmal atrial fibrillation (HCC)    Asthma    Gastroesophageal reflux disease without esophagitis    ECTOR on CPAP    Type 2 diabetes mellitus with complication, without long-term current use of insulin ([address]    Spinal stenosis in cervical region    Hypomagnesemia    Hyperphosphaturia    Hypocalcemia    Parkinson's disease ([address]    Acute cystitis with hematuria    Altered mental status    Iron deficiency anemia due to chronic blood loss    Morbid obesity with BMI of 40.0-44.9, adult (HCC)    Hyperplastic polyp of intestine    Diverticulosis    Panlobular emphysema (HCC)    Rapid atrial fibrillation (HCC)    CKD (chronic kidney disease) stage 3, GFR 30-59 ml/min    Anemia in chronic kidney disease    Chronic respiratory failure with hypoxia and hypercapnia (HCC)    Acute kidney injury superimposed on chronic kidney disease ([address]    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 coli UTI (urinary tract infection)    Nephrolithiasis    Candidal intertrigo    Venous insufficiency    Malabsorption    Anemia of chronic renal failure, stage 4 (severe) (HCC)    Iron deficiency    Coronary atherosclerosis    COPD exacerbation (HCC)    Restless leg syndrome    SIRS (systemic inflammatory response syndrome) (HCC)    Nausea and vomiting in adult    Bacterial UTI    Odynophagia    Esophageal dysphagia    Candida esophagitis (HCC)    Sepsis due to urinary tract infection (HCC)    Chronic respiratory failure with hypoxia (HCC)    Chronic diastolic congestive heart failure (HCC)    History of ESBL E. coli infection    Stage 4 chronic kidney disease (HCC)    Fever    Macrocytic anemia    Hypercalcemia    Monoclonal gammopathy of undetermined significance    Acute nonintractable headache    Anemia of chronic renal failure, stage 4 (severe) (HCC)    Traumatic closed displaced fracture of proximal end of right humerus, initial encounter    Urinary tract infection without hematuria    Metabolic encephalopathy    Chest pain    Acute kidney injury ([address]    Right leg swelling    Hematuria    Chest pain, unspecified    Chronic obstructive pulmonary disease with (acute) exacerbation (HCC)     Acute on chronic respiratory failure with hypoxia hypercapnia  ECTOR on CPAP  COPD exacerbation  CKD4  Type 2 diabetes with hyperglycemia and renal complications  Essential hypertension  Paroxysmal atrial fibrillation  Chronic anticoagulation  Hematuria  Right renal calculi  Parkinson's disease  Morbid obesity excess calories plan:    Cystoscopy was done today, meds labs reviewed, avoid nephrotoxic drugs continue with anticoagulation before meals and at bedtime Accu-Cheks with insulin coverage see orders      Genesis Miles MD  5:11 PM

## 2020-10-28 NOTE — PROGRESS NOTES
[facility]   Urology Progress Note            Subjective: gross hematuria    Patient Vitals for the past 24 hrs:   BP Temp Temp src Pulse Resp SpO2 Weight   10/28/20 0522 (!) 154/65 97.7 °F (36.5 °C) Oral 88 22 98 % --   10/28/20 0307 -- -- -- -- 12 100 % --   10/28/20 0306 -- -- -- -- 12 -- --   10/27/20 2334 (!) 155/66 97.6 °F (36.4 °C) Axillary 72 23 100 % --   10/27/20 2327 -- -- -- -- 21 -- --   10/27/20 2001 (!) 152/54 97.2 °F (36.2 °C) Oral 67 20 100 % --   10/27/20 1601 (!) 136/56 97.5 °F (36.4 °C) Oral 87 20 100 % --   10/27/20 1416 -- -- -- -- 18 100 % --   10/27/20 1203 (!) 147/62 98 °F (36.7 °C) Oral 82 18 100 % --   10/27/20 1044 -- -- -- -- 18 99 % --   10/27/20 0837 (!) 156/68 98.4 °F (36.9 °C) Oral 78 20 99 % --   10/27/20 0635 -- -- -- -- -- -- 192 lb 5 oz (87.2 kg)   10/27/20 0611 -- -- -- -- -- 99 % --       Intake/Output Summary (Last 24 hours) at 10/28/2020 0609  Last data filed at 10/28/2020 9301  Gross per 24 hour   Intake 956 ml   Output 1850 ml   Net -894 ml       Recent Labs     10/26/20  0502 10/28/20  0516   WBC 11.3 11.5*   HGB 9.3* 8.8*   HCT 31.6* 30.0*   .7* 108.3*   * 111*     Recent Labs     10/26/20  0502 10/27/20  1305 10/28/20  0516    140 139   K 4.7 4.3 4.8   CL 98 96* 94*   CO2 35* 36* 37*   PHOS  --   --  4.6*   BUN 47* 49* 50*   CREATININE 1.73* 1.80* 1.89*       Recent Labs     10/26/20  1150   COLORU YELLOW   PHUR 5.0   WBCUA 2 TO 5   RBCUA TOO NUMEROUS TO COUNT   MUCUS NOT REPORTED   TRICHOMONAS NOT REPORTED   YEAST NOT REPORTED   BACTERIA NOT REPORTED   SPECGRAV 1.020   LEUKOCYTESUR NEGATIVE   UROBILINOGEN Normal   BILIRUBINUR NEGATIVE       Additional Lab/culture results:    Physical Exam: patient is not in acute distress  She was seen at the change of shift still has blood in the urine we intended to obtain a CT abdomen and pelvis without contrast as well as scheduled for cystoscopy in a.m.   Interval Imaging Findings:CT abdomen and pelvis in the a.m. without IV contrast    Impression:  Gross hematuria, creatinine trending up  Patient Active Problem List   Diagnosis    Controlled type 2 diabetes mellitus with stage 3 chronic kidney disease, without long-term current use of insulin (HCC)    Hyperlipidemia    Essential hypertension    Chronic leg pain    Paroxysmal atrial fibrillation (HCC)    Asthma    Gastroesophageal reflux disease without esophagitis    ECTOR on CPAP    Type 2 diabetes mellitus with complication, without long-term current use of insulin (HCC)    Spinal stenosis in cervical region    Hypomagnesemia    Hyperphosphaturia    Hypocalcemia    Parkinson's disease ([facility]    Acute cystitis with hematuria    Altered mental status    Iron deficiency anemia due to chronic blood loss    Morbid obesity with BMI of 40.0-44.9, adult (HCC)    Hyperplastic polyp of intestine    Diverticulosis    Panlobular emphysema (HCC)    Rapid atrial fibrillation (HCC)    CKD (chronic kidney disease) stage 3, GFR 30-59 ml/min    Anemia in chronic kidney disease    Chronic respiratory failure with hypoxia and hypercapnia (HCC)    Acute kidney injury superimposed on chronic kidney disease (HCC)    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 coli UTI (urinary tract infection)    Nephrolithiasis    Candidal intertrigo    Venous insufficiency    Malabsorption    Anemia of chronic renal failure, stage 4 (severe) (HCC)    Iron deficiency    Coronary atherosclerosis    COPD exacerbation (HCC)    Restless leg syndrome    SIRS (systemic inflammatory response syndrome) (HCC)    Nausea and vomiting in adult    Bacterial UTI    Odynophagia    Esophageal dysphagia    Candida esophagitis (HCC)    Sepsis due to urinary tract infection (HCC)    Chronic respiratory failure with hypoxia (HCC)    Chronic diastolic congestive heart failure (HCC)    History of ESBL E. coli infection    Stage 4 chronic kidney disease (HCC)    Fever    Macrocytic anemia    Hypercalcemia    Monoclonal gammopathy of undetermined significance    Acute nonintractable headache    Anemia of chronic renal failure, stage 4 (severe) (HCC)    Traumatic closed displaced fracture of proximal end of right humerus, initial encounter    Urinary tract infection without hematuria    Metabolic encephalopathy    Chest pain    Acute kidney injury ([address]    Right leg swelling    Hematuria    Chest pain, unspecified    Chronic obstructive pulmonary disease with (acute) exacerbation ([address]       Plan: cystoscopy     Eric Wing  6:09 AM 10/28/2020

## 2020-10-28 NOTE — PROGRESS NOTES
Physician Progress Note      PATIENT:               Moses Valladares  CSN #:                  [id number]  :                       1945  ADMIT DATE:       10/22/2020 4:18 PM  DISCH DATE:  RESPONDING  PROVIDER #:        Jesusita Hardin MD          QUERY TEXT:    Pt admitted with history of diastolic CHF and is noted to be fluid overloaded   requiring IV Lasix. If possible, please document in progress notes and   discharge summary further specificity regarding the acuity of CHF:    The medical record reflects the following:  Risk Factors: 70 yr old admitted for COPD exac., acute on chronic respiratory   failure  Clinical Indicators: ECHO 2019: Marvin Palomino left ventricular systolic function   is normal Estimated ejection fraction is 65 %. BNP increase from 749 at admit   to 1881 on 10/26.  Per nephrology, \"Patient is volume overloaded at the current   time\"  Treatment: Lasix  IV 20mg BID, nephrology input    Thank you, Shiva Rivas RN, CDS  [phone]  Options provided:  -- Acute on Chronic Diastolic CHF/HFpEF  -- Chronic Diastolic CHF/HFpEF  -- Other - I will add my own diagnosis  -- Disagree - Not applicable / Not valid  -- Disagree - Clinically unable to determine / Unknown  -- Refer to Clinical Documentation Reviewer    PROVIDER RESPONSE TEXT:    See progress note    Query created by: Raúl Vasquez on 10/28/2020 2:23 PM      Electronically signed by:  Jesusita Hardin MD 10/28/2020 4:08 PM

## 2020-10-29 NOTE — PROGRESS NOTES
Physical Therapy  Facility/Department: [facility] PROGRESSIVE CARE  Daily Treatment Note  NAME: Robert Diamond  : 1945  MRN: [id number]    Date of Service: 10/29/2020    Discharge Recommendations:  Home with Home health PT, 24 hour supervision or assist       RN reports patient is medically stable for therapy treatment this date. Chart reviewed prior to treatment and patient is agreeable for therapy. All lines intact and patient positioned comfortably at end of treatment. All patient needs addressed prior to ending therapy session. Assessment   Body structures, Functions, Activity limitations: Decreased strength;Decreased endurance;Decreased balance;Decreased functional mobility   Assessment: Pt tolerated PT session well, limited by fatigue and endurance this date. Prognosis: Good  Clinical Presentation: evolving  PT Education: Transfer Training;PT Role;Energy Conservation; Functional Mobility Training;Plan of Care;Gait Training;General Safety  REQUIRES PT FOLLOW UP: Yes  Activity Tolerance  Activity Tolerance: Patient Tolerated treatment well;Patient limited by fatigue;Patient limited by endurance     Patient Diagnosis(es): The encounter diagnosis was COPD exacerbation ([address]      has a past medical history of Acute on chronic diastolic congestive heart failure ([address] Anesthesia complication, Asthma, Atrial fibrillation ([address] Cataracts, bilateral, Cellulitis, Cerebral artery occlusion with cerebral infarction ([address] CHF (congestive heart failure) ([address] Chronic kidney disease, Chronic kidney disease, Closed fracture of proximal end of right humerus with routine healing, Constipation, Controlled type 2 diabetes mellitus with stage 3 chronic kidney disease, without long-term current use of insulin ([address] COPD (chronic obstructive pulmonary disease) ([address] Difficult intravenous access, Difficulty walking, Diverticulosis, DM2 (diabetes mellitus, type 2) ([address] Full dentures, GERD (gastroesophageal reflux disease), H/O fall, Headache(784.0), [place] (hard of hearing), Hyperlipidemia, Hyperplastic polyp of intestine, Hypertension, Impaired ambulation, Kidney stone, Living in nursing home, Morbid obesity with BMI of 40.0-44.9, adult ([facility] Muscle weakness, Nephrolithiasis, Open wound, ECTOR on CPAP, Osteoarthritis, Parkinson disease ([facility] [address] [facility] Restless leg syndrome, Spinal stenosis in cervical region, Type II or unspecified type diabetes mellitus without mention of complication, not stated as uncontrolled, Urethral caruncle, Wears glasses, and Wears glasses. has a past surgical history that includes Cervical disc surgery (2012); Appendectomy; Tonsillectomy and adenoidectomy (1953); hernia repair (1999); eye surgery (Bilateral, 2017); Cervical spine surgery (5/31/13); laminectomy (05/31/2013); Upper gastrointestinal endoscopy (12/28/2016); Colonoscopy (2009); Colonoscopy (12/29/2016); Colonoscopy (12/30/2016); Colonoscopy (04/25/2017); pr colsc flx w/removal lesion by hot bx forceps (N/A, 4/25/2017); Cystorrhaphy (04/04/2018); pr cystourethroscopy,biopsies (N/A, 4/4/2018); pr [facility] incl fluor gdnce dx w/cell washg spx (N/A, 6/5/2018); Upper gastrointestinal endoscopy (N/A, 8/29/2018); shoulder fracture surgery (Right, 5/28/2019); Hardware Removal (Right, 07/05/2019); Hardware Removal (Right, 7/5/2019); and Cystoscopy (N/A, 10/28/2020). Restrictions  Restrictions/Precautions  Restrictions/Precautions: Fall Risk, Up as Tolerated, Contact Precautions  Required Braces or Orthoses?: No  Position Activity Restriction  Other position/activity restrictions: Up with assist, PD, RUE IV, O2, fluid restriction  Subjective   General  Chart Reviewed: Yes  Response To Previous Treatment: Patient with no complaints from previous session. Family / Caregiver Present: No  Subjective  Subjective: Pt is agreeable to PT, states she needs to go to the bathroom. General Comment  Comments: Pt agreeable for therapy. Orientation  Orientation  Overall Orientation Status: Within Normal Limits  Cognition   Cognition  Overall Cognitive Status: WNL  Cognition Comment: Pt has good awareness of her deficits  Objective   Bed mobility  Rolling to Left: Minimal assistance  Rolling to Right: Minimal assistance  Supine to Sit: Moderate assistance  Sit to Supine: (returned to sitting in chair)  Comment: Pt rolled left<>right multiple times for brief change. Pt required Patsy with verbal/tactile cueing for use of handrails. Required assist with turning hips over in bed. HOB elevated, handrails used and increased time to complete from supine>sit. Transfers  Sit to Stand: Minimal Assistance  Stand to sit: Minimal Assistance  Bed to Chair: Contact guard assistance  Comment: Pt demo'd better sit>stand transfers this date with good carryover of hand placement prior to standing and sitting. Pt still required cueing for safety and squaring up RW prior to sitting down. Practiced sit<>stand x5 from chair with rest breaks in between. Ambulation  Ambulation?: Yes  Ambulation 1  Surface: level tile  Device: Rolling Walker  Other Apparatus: O2  Assistance: Contact guard assistance  Quality of Gait: Increased time to complete turns with max cues for RW. Gait Deviations: Slow Trena; Increased NANCY; Decreased step length;Decreased step height;Deviated path  Distance: 30ft within room  Comments: No increase of SOB noted this date after ambulation. Balance  Posture: Fair  Sitting - Static: Good  Sitting - Dynamic: Good  Standing - Static: Fair;+  Standing - Dynamic: Fair;-(BUE support from RW)  Exercises  Heelslides: 20 reps  Gluteal Sets: 20 reps  Hip Flexion: 20 reps  Knee Long Arc Quad: 20 reps  Comments: Postural exercises completed while sitting unsupported in chair.  Focused on \"big\" movements this date                        OutComes Score  AM-PAC Score  AM-PAC Inpatient Mobility Raw Score : 16 (10/29/20 2559)  AM-PAC Inpatient T-Scale Score : 40.78 (10/29/20 1239)  Mobility Inpatient CMS 0-100% Score: 54.16 (10/29/20 1239)  Mobility Inpatient CMS G-Code Modifier : CK (10/29/20 1239)          Goals  Short term goals  Time Frame for Short term goals: 12 treatments  Short term goal 1: Independent transfers  Short term goal 2: Independent ambulation w/ ' x 1  Short term goal 3: Tolerate 30 min ther act  Short term goal 4: 1/2 to 1 grade strength increase B LE's  Short term goal 5: Good standing balance  Patient Goals   Patient goals : Feel better consistently    Plan    Plan  Times per week: 1-2x/day,6-7 days/week  Current Treatment Recommendations: Strengthening, Balance Training, Transfer Training, Endurance Training, Gait Training, Safety Education & Training, Functional Mobility Training  Safety Devices  Type of devices:  All fall risk precautions in place, Call light within reach, Chair alarm in place, Gait belt, Left in chair, Nurse notified  Restraints  Initially in place: No     Therapy Time   Individual Concurrent Group Co-treatment   Time In 1009         Time Out 225 TIMOTHY Conde

## 2020-10-29 NOTE — PROGRESS NOTES
diabetes mellitus with stage 3 chronic kidney disease, without long-term current use of insulin (HCC)    Hyperlipidemia    Essential hypertension    Chronic leg pain    Paroxysmal atrial fibrillation (HCC)    Asthma    Gastroesophageal reflux disease without esophagitis    ECTOR on CPAP    Type 2 diabetes mellitus with complication, without long-term current use of insulin (HCC)    Spinal stenosis in cervical region    Hypomagnesemia    Hyperphosphaturia    Hypocalcemia    Parkinson's disease ([address]    Acute cystitis with hematuria    Altered mental status    Iron deficiency anemia due to chronic blood loss    Morbid obesity with BMI of 40.0-44.9, adult (HCC)    Hyperplastic polyp of intestine    Diverticulosis    Panlobular emphysema (HCC)    Rapid atrial fibrillation (HCC)    CKD (chronic kidney disease) stage 3, GFR 30-59 ml/min    Anemia in chronic kidney disease    Chronic respiratory failure with hypoxia and hypercapnia (HCC)    Acute kidney injury superimposed on chronic kidney disease (HCC)    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 coli UTI (urinary tract infection)    Nephrolithiasis    Candidal intertrigo    Venous insufficiency    Malabsorption    Anemia of chronic renal failure, stage 4 (severe) (HCC)    Iron deficiency    Coronary atherosclerosis    COPD exacerbation (HCC)    Restless leg syndrome    SIRS (systemic inflammatory response syndrome) (HCC)    Nausea and vomiting in adult    Bacterial UTI    Odynophagia    Esophageal dysphagia    Candida esophagitis (HCC)    Sepsis due to urinary tract infection (HCC)    Chronic respiratory failure with hypoxia (HCC)    Chronic diastolic congestive heart failure (HCC)    History of ESBL E. coli infection    Stage 4 chronic kidney disease (HCC)    Fever    Macrocytic anemia    Hypercalcemia    Monoclonal gammopathy of undetermined significance    Acute nonintractable headache    Anemia of

## 2020-10-29 NOTE — PLAN OF CARE
Nutrition Problem #1: Predicted inadequate energy intake  Intervention: Food and/or Nutrient Delivery: Continue Current Diet  Nutritional Goals: meet greater than 75% of estimated nutrition needs

## 2020-10-29 NOTE — PROGRESS NOTES
Nephrology Progress Note    Alyssa Jeffrey MD       Follow Up for (CC) : CKD3/4      Physician Addendum  I have seen and examined pt at bed side.    I reviewed and agree with CNP's note. I performed all key and critical portions of this evaluation. I agree with the plan of care as noted above.     Electronically signed by Tre Sidhu MD on 10/29/20 3:38 PM            SUBJECTIVE      Creatinine improved to 1.75  Feels better in general. SOB much better. ASSESSMENT     CKD stage 4 with baseline GFR of 20s ml/min. Creatinine is at baseline. Hypertension. Anemia. Hypoparathyroidism. History of CHF and COPD. Hypomagnesemia  Nephrolithiasis    Active Problems:    COPD exacerbation (HCC)  Resolved Problems:    * No resolved hospital problems. *    PLAN   Continue Lasix IV  20mg BID. On Aranesp. Target hgb 10-11.5. Magnesium level ok. Monitor GFR electrolytes  Optimization of patient's of pulmonary treatment as per pulmonary service noted  Urology consulted for hematuria, CT scan was remarkable for bilateral kidney stones, s/p cysto 10/28. Urology plans noted. Ok to increase eliquis to 5mg BID from renal standpoint once resumed. Outpatient renal follow-up needed and recommended. Please do not hesitate to call with questions. Pt seen in collaboration with Dr. Pedro Dunbar.     Chief Complaint   Patient presents with    Shortness of Breath       Patient Active Problem List   Diagnosis    Controlled type 2 diabetes mellitus with stage 3 chronic kidney disease, without long-term current use of insulin (HCC)    Hyperlipidemia    Essential hypertension    Chronic leg pain    Paroxysmal atrial fibrillation (HCC)    Asthma    Gastroesophageal reflux disease without esophagitis    ECTOR on CPAP    Type 2 diabetes mellitus with complication, without long-term current use of insulin ([address]    Spinal stenosis in cervical region    Hypomagnesemia    Hyperphosphaturia    Hypocalcemia    Parkinson's disease [facility]    Acute cystitis with hematuria    Altered mental status    Iron deficiency anemia due to chronic blood loss    Morbid obesity with BMI of 40.0-44.9, adult (HCC)    Hyperplastic polyp of intestine    Diverticulosis    Panlobular emphysema (HCC)    Rapid atrial fibrillation (HCC)    CKD (chronic kidney disease) stage 3, GFR 30-59 ml/min    Anemia in chronic kidney disease    Chronic respiratory failure with hypoxia and hypercapnia (HCC)    Acute kidney injury superimposed on chronic kidney disease (HCC)    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 coli UTI (urinary tract infection)    Nephrolithiasis    Candidal intertrigo    Venous insufficiency    Malabsorption    Anemia of chronic renal failure, stage 4 (severe) ([facility])    Iron deficiency    Coronary atherosclerosis    COPD exacerbation (HCC)    Restless leg syndrome    SIRS (systemic inflammatory response syndrome) (HCC)    Nausea and vomiting in adult    Bacterial UTI    Odynophagia    Esophageal dysphagia    Candida esophagitis (HCC)    Sepsis due to urinary tract infection (HCC)    Chronic respiratory failure with hypoxia (HCC)    Chronic diastolic congestive heart failure (HCC)    History of ESBL E. coli infection    Stage 4 chronic kidney disease (HCC)    Fever    Macrocytic anemia    Hypercalcemia    Monoclonal gammopathy of undetermined significance    Acute nonintractable headache    Anemia of chronic renal failure, stage 4 (severe) (HCC)    Traumatic closed displaced fracture of proximal end of right humerus, initial encounter    Urinary tract infection without hematuria    Metabolic encephalopathy    Chest pain    Acute kidney injury ([address]    Right leg swelling    Hematuria    Chest pain, unspecified    Chronic obstructive pulmonary disease with (acute) exacerbation (HCC)       CURRENT MEDICATIONS / Allergies [place] Colon History / Family History     Current Facility-Administered Medications   Medication Dose Route Frequency Provider Last Rate Last Dose    darbepoetin albaro-polysorbate (ARANESP) injection 60 mcg  60 mcg Subcutaneous Once per day on Wed Chloe Ruelas MD   60 mcg at 10/28/20 1725    predniSONE (DELTASONE) tablet 40 mg  40 mg Oral Daily ANSHUL Peralta - CNP   40 mg at 10/29/20 0855    furosemide (LASIX) injection 20 mg  20 mg Intravenous BID Perico Rivera MD   20 mg at 10/29/20 4497    modafinil (PROVIGIL) tablet 100 mg  100 mg Oral Daily Perico Rivera MD   100 mg at 10/29/20 7124    apixaban (ELIQUIS) tablet 2.5 mg  2.5 mg Oral BID Perico Rivera MD   Stopped at 10/28/20 1118    melatonin tablet 3 mg  3 mg Oral Nightly PRN Perioc Rivera MD   3 mg at 10/28/20 2226    albuterol (PROVENTIL) nebulizer solution 2.5 mg  2.5 mg Nebulization Q4H PRN Perico Rivera MD   2.5 mg at 10/23/20 0405    albuterol (PROVENTIL) nebulizer solution 2.5 mg  2.5 mg Nebulization 4x daily Perico Rivera MD   2.5 mg at 10/29/20 1113    ALPRAZolam [facility] [place]) tablet 0.25 mg  0.25 mg Oral BID PRN Perico Rivera MD   0.25 mg at 10/28/20 2226    amiodarone (CORDARONE) tablet 200 mg  200 mg Oral Daily Perico Rivera MD   200 mg at 10/29/20 3211    atorvastatin (LIPITOR) tablet 20 mg  20 mg Oral Daily Perico Rivera MD   20 mg at 10/29/20 0854    carbidopa-levodopa (SINEMET CR)  MG per extended release tablet 1 tablet  1 tablet Oral 4x Daily Perico Rivera MD   1 tablet at 10/29/20 1310    budesonide-formoterol (SYMBICORT) 160-4.5 MCG/ACT inhaler 2 puff  2 puff Inhalation BID Perico Rivera MD   2 puff at 10/29/20 0732    isosorbide mononitrate (IMDUR) extended release tablet 30 mg  30 mg Oral Daily Perico Rivera MD   30 mg at 10/29/20 0854    metoprolol tartrate (LOPRESSOR) tablet 25 mg  25 mg Oral Daily Perico Rivera MD   25 mg at 10/29/20 0854    pantoprazole (PROTONIX) tablet 40 mg  40 mg Oral [facility] Perico Rivera MD   40 mg at 10/29/20 0612    rasagiline (AZILECT) tablet 1 mg  1 mg Oral Daily Son Cano MD   1 mg at 10/29/20 0854    rOPINIRole (REQUIP) tablet 2 mg  2 mg Oral TID Son Cano MD   2 mg at 10/29/20 1310    tiotropium (SPIRIVA RESPIMAT) 2.5 MCG/ACT inhaler 2 puff  2 puff Inhalation Daily Son Cano MD   2 puff at 10/29/20 0732    sodium chloride flush 0.9 % injection 10 mL  10 mL Intravenous 2 times per day Son Cano MD   10 mL at 10/29/20 0851    sodium chloride flush 0.9 % injection 10 mL  10 mL Intravenous PRN Son Cano MD        acetaminophen (TYLENOL) tablet 650 mg  650 mg Oral Q6H PRN Son Cano MD   650 mg at 10/28/20 1731    Or    acetaminophen (TYLENOL) suppository 650 mg  650 mg Rectal Q6H PRN Son Cano MD        polyethylene glycol [facility]) packet 17 g  17 g Oral Daily PRN Son Cano MD        promethazine (PHENERGAN) tablet 12.5 mg  12.5 mg Oral Q6H PRN Son Cano MD        Or    ondansetron [facility]) injection 4 mg  4 mg Intravenous Q6H PRN Son Cano MD   4 mg at 10/26/20 2214    glucose (GLUTOSE) 40 % oral gel 15 g  15 g Oral PRN Son Cano MD        dextrose 50 % IV solution  12.5 g Intravenous PRN Son Cano MD        glucagon (rDNA) injection 1 mg  1 mg Intramuscular PRN Son Cano MD        dextrose 5 % solution  100 mL/hr Intravenous PRN Son Cano MD        insulin lispro (HUMALOG) injection vial 0-6 Units  0-6 Units Subcutaneous TID WC Son Cano MD   1 Units at 10/29/20 1310    insulin lispro (HUMALOG) injection vial 0-3 Units  0-3 Units Subcutaneous Nightly Son Cano MD   2 Units at 10/28/20 2048          Allergies   Allergen Reactions    Dye [Barium-Containing Compounds] Other (See Comments)     Cause Afib per     Pcn [Penicillins] Itching and Swelling    Bactrim [Sulfamethoxazole-Trimethoprim] Other (See Comments)     Allergic Nephritis       Social History     Socioeconomic History    Marital status:      Spouse name: Not on file    Number of children: Not on file    Years of education: Not on file    Highest education level: Not on file   Occupational History    Not on file   Social Needs    Financial resource strain: Not on file    Food insecurity     Worry: Not on file     Inability: Not on file    Transportation needs     Medical: Not on file     Non-medical: Not on file   Tobacco Use    Smoking status: Former Smoker     Packs/day: 2.00     Years: 15.00     Pack years: 30.00     Types: Cigarettes     Last attempt to quit: 10/31/1970     Years since quittin.0    Smokeless tobacco: Never Used   Substance and Sexual Activity    Alcohol use: Not Currently    Drug use: No     Comment: Pt denies use.  Sexual activity: Not on file     Comment: not for the past year d/t illness, denies  concerns/pain   Lifestyle    Physical activity     Days per week: Not on file     Minutes per session: Not on file    Stress: Not on file   Relationships    Social connections     Talks on phone: Not on file     Gets together: Not on file     Attends Yazdanism service: Not on file     Active member of club or organization: Not on file     Attends meetings of clubs or organizations: Not on file     Relationship status: Not on file    Intimate partner violence     Fear of current or ex partner: Not on file     Emotionally abused: Not on file     Physically abused: Not on file     Forced sexual activity: Not on file   Other Topics Concern    Not on file   Social History Narrative    Not on file       Family History   Problem Relation Age of Onset    Heart Failure Mother     Hypertension Mother     Heart Disease Mother     High Blood Pressure Mother        REVIEW OF SYSTEMS     All other ROS is negative.     OBJECTIVE      Vitals:    10/29/20 1308   BP: (!) 125/59   Pulse: 78   Resp: 16   Temp: 98.4 °F (36.9 °C)   SpO2: 93%     Wt Readings from Last 3 Encounters:   10/28/20 188 lb 12.8 oz (85.6 kg)   09/23/20 170 lb (77.1 kg)   02/28/20 173 lb 1.6 oz (78.5 kg)     I/O last 3 completed shifts:  In: -   Out: 775 [Urine:775]  Body mass index is 40.86 kg/m². PHYSICAL EXAM      GENERAL APPEARANCE:Awake, alert, in no acute distress  SKIN: warm and dry, no rash or erythema  EYES: conjunctivae normal and sclera anicteric  NECK:  JVD Absent. PULMONARY: Symmetrical and decreased breath sounds BL. CADRDIOVASCULAR: S1 and S2 audible. ABDOMEN: soft nontender, bowel sounds present. EXTREMITIES: BL edema + R>L.     LABS      Data:    CBC:   Recent Labs     10/28/20  0516 10/29/20  0530   WBC 11.5* 12.7*   HGB 8.8* 9.2*   HCT 30.0* 31.5*   .3* 107.1*   * 138     BMP:    Recent Labs     10/27/20  1305 10/28/20  0516 10/29/20  0530    139 138   K 4.3 4.8 4.4   CL 96* 94* 93*   CO2 36* 37* 37*   BUN 49* 50* 55*   CREATININE 1.80* 1.89* 1.75*   GLUCOSE 237* 271* 264*     CMP:   Recent Labs     10/27/20  1305 10/28/20  0516 10/29/20  0530    139 138   K 4.3 4.8 4.4   CL 96* 94* 93*   CO2 36* 37* 37*   BUN 49* 50* 55*   CREATININE 1.80* 1.89* 1.75*   GLUCOSE 237* 271* 264*   CALCIUM 7.4* 6.9* 6.6*                     URINALYSIS/URINE CHEMISTRIES      URINE SODIUM:    Lab Results   Component Value Date    EDWARD 46 03/04/2020      URINE OSMOLARITY:  No results found for: OSMOU  URINE CREATININE:    Lab Results   Component Value Date    LABCREA 79.1 03/04/2020     URINE EOSINOPHILS: No components found for: EOSU  URINALYSIS:  U/A:     RADIOLOGY      Results for orders placed during the hospital encounter of 02/28/20   US RENAL COMPLETE    Narrative EXAMINATION:  RETROPERITONEAL ULTRASOUND OF THE KIDNEYS AND URINARY BLADDER    3/3/2020    COMPARISON:  CT renal stone protocol from 02/12/2020    HISTORY:  ORDERING SYSTEM PROVIDED HISTORY: increased creatinine  TECHNOLOGIST PROVIDED HISTORY:  increased creatinine    81-year-old female with elevated creatinine    FINDINGS:    Kidneys:    Exam limited due to patient's inability to hold breath. Right kidney measures 8.7 x 4.3 x 4.4 cm. Right renal cortical thickness  measures 1.1 cm. Left kidney measures 10.2 x 4.5 x 4.3 cm. Left renal cortical thickness  measures 1.1 cm. Echogenic foci with acoustic shadowing are seen in the bilateral kidneys  likely representing bilateral renal calculi and/or renal vascular  calcifications with the largest in the medial right renal hilum measuring 1.3  cm. No hydronephrosis or perinephric fluid. Gross preservation of the corticomedullary differentiation. Gallstones and gallbladder sludge. Bladder:    Patient voided immediately prior to the exam.  Small postvoid residual.      Impression 1. Bilateral renal calculi and/or renal vascular calcifications. No  hydronephrosis. 2. Gallstones and gallbladder sludge. 3. Small postvoid residual.  4. Exam limited due to patient's inability to breath hold. [facility], [facility]. Ph: [phone]            Physician Addendum  I have seen and examined pt at bed side.    I reviewed and agree with CNP's note. I performed all key and critical portions of this evaluation.  I agree with the plan of care as noted above.     Electronically signed by Moe Wood MD on 10/29/20 3:38 PM

## 2020-10-29 NOTE — PROGRESS NOTES
Nutrition Assessment     Type and Reason for Visit: Reassess    Nutrition Recommendations/Plan:   1. Continue CARB CONTROL; Carb Control: 4 carb choices (60 gms)/meal; No Added Salt (3-4 GM); Dysphagia Soft and Bite-Sized diet; Daily Fluid Restriction: 1800 ml  2. Monitor p.o intake, diet tolerance and labs    Nutrition Assessment:  Patient appears to have good oral intake and has a good appetite. Patient is status post cystoscopy 10/28. Elevated glucose labs are noted. Continue current diet. Monitor p.o intakes and labs. Malnutrition Assessment:  Malnutrition Status: At risk for malnutrition (Comment)    Estimated Daily Nutrient Needs:  Energy (kcal): 1600 kcal based on [place]-St. Jeor (1.3 factor; increased for Parkinson's); Weight Used for Energy Requirements:  Current     Protein (g): 55-63 gm 1.4-1.6 gm/kg of ideal (CKD- IV); Weight Used for Protein Requirements:  Ideal            Nutrition Related Findings: +1 BLE edema. Cystoscopy 10/28      Current Nutrition Therapies:    DIET CARB CONTROL; Carb Control: 4 carb choices (60 gms)/meal; No Added Salt (3-4 GM);  Dysphagia Soft and Bite-Sized; Daily Fluid Restriction: 1800 ml    Anthropometric Measures:  · Height: 4' 9\" (144.8 cm)  · Current Body Wt: 188 lb (85.3 kg)   · BMI: 40.7    Nutrition Diagnosis:   · Predicted inadequate energy intake related to swallowing difficulty, cognitive or neurological impairment, biting/chewing (masticatory) difficulty as evidenced by intake 51-75%      Nutrition Interventions:   Food and/or Nutrient Delivery:  Continue Current Diet  Nutrition Education/Counseling:  Education not indicated   Coordination of Nutrition Care:  Continue to monitor while inpatient    Goals:  meet greater than 75% of estimated nutrition needs       Nutrition Monitoring and Evaluation:   Food/Nutrient Intake Outcomes:  Food and Nutrient Intake  Physical Signs/Symptoms Outcomes:  Biochemical Data, Fluid Status or Edema, Weight, Skin     Discharge

## 2020-10-29 NOTE — PROGRESS NOTES
troponin  · A. fib, anxiety, DM, HLD, HTN,  GERD  · Cluster of stones in right renal pelvis measuring 12 mm  · Gross hematuria, s/p cystoscopy    Recommendations:  · BiPAP while asleep and as necessary during the day  · Oxygen via nasal cannula, keep SPO2 greater than 92%   · Incentive spirometry every hour while awake  · 7-day course of Zithromax completed  · Prednisone taper  · Albuterol Q 4 hours and prn  · Symbicort 160  · Spiriva Respimat  · Continue Modafinil  · Diuresis per nephrology  · Urology recommending right stent placement with possible lithotripsy, will need to be off anticoagulation x4 days from their standpoint  · Labs: CBC and BMP in am  · DVT prophylaxis, on Eliquis currently on hold for stent placement and lithotripsy  · PT/OT  · Discussed with RN  · Above assessment and plan will be reviewed with Dr. Camilla Smith. Final plan when Dr. Camilla Smith rounds.   · Will follow with you    ANSHUL Aguilar-CNP   Pulmonary Critical Care and Sleep Medicine,  Patient seen under the supervision of Anni Robledo MD, [address] [place] [address]  Office: [phone]

## 2020-10-29 NOTE — PROGRESS NOTES
Subjective:     Follow-up type 2 diabetes  No polyuria no polydipsia no hypoglycemia blood sugars reviewed slightly elevated  ROS  Fever no chills no GI/ complaints no cardiopulmonary complaints no TIA no bleeding no headache no sore throat no skin lesions much less shortness of breath no cough  physical exam  General Appearance: in no acute distress and alert  Skin: warm and dry, no rash or erythema  Head: normocephalic and atraumatic  Eyes: pupils equal, round, and reactive to light, conjunctivae normal and sclera anicteric    Neck: neck supple and non tender without mass   Pulmonary/Chest: clear to auscultation bilaterally- no wheezes, rales or rhonchi, normal air movement, no respiratory distress  Cardiovascular: normal rate, regular rhythm, normal S1 and S2, no gallops, intact distal pulses and no carotid bruits  Abdomen: soft, non-tender, non-distended, normal bowel sounds, no masses or organomegaly  Extremities: Edema good pulses negative Homans' sign  Neurologic: Alert oriented x3 with no focal deficits    /64   Pulse 79   Temp 98.4 °F (36.9 °C) (Oral)   Resp 16   Ht 4' 9\" (1.448 m)   Wt 188 lb 12.8 oz (85.6 kg)   LMP 04/03/2004 (Within Years)   SpO2 95%   BMI 40.86 kg/m²     CBC:   Lab Results   Component Value Date    WBC 12.7 10/29/2020    RBC 2.94 10/29/2020    HGB 9.2 10/29/2020    HCT 31.5 10/29/2020    .1 10/29/2020    MCH 31.3 10/29/2020    MCHC 29.2 10/29/2020    RDW 15.9 10/29/2020     10/29/2020    MPV 11.5 10/29/2020     BMP:    Lab Results   Component Value Date     10/29/2020    K 4.4 10/29/2020    CL 93 10/29/2020    CO2 37 10/29/2020    BUN 55 10/29/2020    LABALBU 3.8 09/23/2020    LABALBU Detected 11/30/2018    CREATININE 1.75 10/29/2020    CALCIUM 6.6 10/29/2020    GFRAA 35 10/29/2020    LABGLOM 28 10/29/2020    GLUCOSE 264 10/29/2020    GLUCOSE 132 03/07/2012        Assessment:  Patient Active Problem List   Diagnosis    Controlled type 2 diabetes mellitus with stage 3 chronic kidney disease, without long-term current use of insulin (HCC)    Hyperlipidemia    Essential hypertension    Chronic leg pain    Paroxysmal atrial fibrillation (HCC)    Asthma    Gastroesophageal reflux disease without esophagitis    ECTOR on CPAP    Type 2 diabetes mellitus with complication, without long-term current use of insulin (HCC)    Spinal stenosis in cervical region    Hypomagnesemia    Hyperphosphaturia    Hypocalcemia    Parkinson's disease ([address]    Acute cystitis with hematuria    Altered mental status    Iron deficiency anemia due to chronic blood loss    Morbid obesity with BMI of 40.0-44.9, adult (HCC)    Hyperplastic polyp of intestine    Diverticulosis    Panlobular emphysema (HCC)    Rapid atrial fibrillation (HCC)    CKD (chronic kidney disease) stage 3, GFR 30-59 ml/min    Anemia in chronic kidney disease    Chronic respiratory failure with hypoxia and hypercapnia (HCC)    Acute kidney injury superimposed on chronic kidney disease (HCC)    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 coli UTI (urinary tract infection)    Nephrolithiasis    Candidal intertrigo    Venous insufficiency    Malabsorption    Anemia of chronic renal failure, stage 4 (severe) (HCC)    Iron deficiency    Coronary atherosclerosis    COPD exacerbation (HCC)    Restless leg syndrome    SIRS (systemic inflammatory response syndrome) (HCC)    Nausea and vomiting in adult    Bacterial UTI    Odynophagia    Esophageal dysphagia    Candida esophagitis (HCC)    Sepsis due to urinary tract infection (HCC)    Chronic respiratory failure with hypoxia (HCC)    Chronic diastolic congestive heart failure (HCC)    History of ESBL E. coli infection    Stage 4 chronic kidney disease (HCC)    Fever    Macrocytic anemia    Hypercalcemia    Monoclonal gammopathy of undetermined significance    Acute nonintractable headache    Anemia of chronic renal failure, stage 4 (severe) (HCC)    Traumatic closed displaced fracture of proximal end of right humerus, initial encounter    Urinary tract infection without hematuria    Metabolic encephalopathy    Chest pain    Acute kidney injury ([facility]    Right leg swelling    Hematuria    Chest pain, unspecified    Chronic obstructive pulmonary disease with (acute) exacerbation (HCC)     Acute on chronic respiratory failure with hypoxia hypercapnia  ECTOR on CPAP  CKD 4  COPD exacerbation  Type 2 diabetes with hyperglycemia and renal complications  Essential hypertension  Paroxysmal atrial fibrillation  Chronic anticoagulation  Hematuria  Right renal calculi Parkinson's disease  Morbid obesity excess calories  Macrocytic anemia  Plan:    Labs reviewed continue with present treatment before meals and at bedtime Accu-Cheks with insulin coverage, avoid nephrotoxic drugs physical therapy occupational therapy, has been off Eliquis for 2 days scheduled tomorrow for cystoscopy and laser treatment hopefully home after that orders      Jamil Kaur MD  6:41 PM

## 2020-10-29 NOTE — CARE COORDINATION
DC planning    Discussed dc plans with pt. Plans to dc home with Berenice De Los Santos, has home 02 24/7. Has a sleep study ordered for 11/3. If pt. Stays will need to reschedule. Potential for another cysto  Prior to dc-pending urology recs. Eliquis on hold last dose 10/27.

## 2020-10-29 NOTE — FLOWSHEET NOTE
Dr Jason Banegas in to see patient, updated and informed that patient's eliquis is on hold pending uro- laser/stent placement per Dr Suresh Rosado also informed that patient is scheduled to have an outpatient sleep study on Tuesday and per  the Covid test that patient had on 10/22/2020 will not be sufficient and was hoping that patient could receive a repeat Covid test prior to being discharged also spoke with Dr. Suresh Rosado in the company of Dr Jason Banegas, clarified that he plans to perform \"laser\" on patient, the following day, to continue to [employer], will notify staff of time for procedure, Dr Jason Banegas requests that outpt sleep study be notified when open and clarify need for Covid testing

## 2020-10-30 NOTE — CONSULTS
Cardiovascular Consult Note     TODAY'S DATE: 10/29/2020    Patient name: Jason Park   YOB: 1945  Date of admission:  10/22/2020       Patient seen, examined. Previous clinical entries reviewed. All available laboratory, imaging and ancillary data reviewed. Reason for Consult:  Atrial fibrillation    History of present Illness:     Jason Park is a 70 y.o. female with past medical history significant for proximal atrial fibrillation and chronic anemia presented to [facility] with complaints of increasing shortness of breath and was found to have COPD exacerbation and acute renal failure. During her course here, she was found to have some hematuria and is planned to have a cystoscopy done. Cardiollogy was consulted regarding anticoagulation. She denies any acute chest pain or palpitations. Her atrial fibrillation rates have been controlled. Shortness of breath is at her baseline.   She does use BiPAP off-and-on    Past Medical History:    has a past medical history of Acute on chronic diastolic congestive heart failure ([address] Anesthesia complication, Asthma, Atrial fibrillation ([address] Cataracts, bilateral, Cellulitis, Cerebral artery occlusion with cerebral infarction ([address] CHF (congestive heart failure) ([address] Chronic kidney disease, Chronic kidney disease, Closed fracture of proximal end of right humerus with routine healing, Constipation, Controlled type 2 diabetes mellitus with stage 3 chronic kidney disease, without long-term current use of insulin ([address] COPD (chronic obstructive pulmonary disease) ([address] Difficult intravenous access, Difficulty walking, Diverticulosis, DM2 (diabetes mellitus, type 2) ([address] Full dentures, GERD (gastroesophageal reflux disease), H/O fall, Headache(784.0), [place] (hard of hearing), Hyperlipidemia, Hyperplastic polyp of intestine, Hypertension, Impaired ambulation, Kidney stone, Living in nursing home, Morbid obesity with BMI of 40.0-44.9, adult [facility]), Muscle weakness, Nephrolithiasis, Open wound, ECTOR on CPAP, Osteoarthritis, Parkinson disease ([address] Restless leg syndrome, Spinal stenosis in cervical region, Type II or unspecified type diabetes mellitus without mention of complication, not stated as uncontrolled, Urethral caruncle, Wears glasses, and Wears glasses.     Surgical History:     Past Surgical History:   Procedure Laterality Date    APPENDECTOMY      CERVICAL [address] SURGERY  2012    CERVICAL SPINE SURGERY  5/31/13    posterior c5-t1    COLONOSCOPY  2009    COLONOSCOPY  12/29/2016    incomplete was not cleaned out    COLONOSCOPY  12/30/2016    COLONOSCOPY  04/25/2017     SIGMOID COLON POLYPECTOMY:  HYPERPLASTIC POLYP ,   DIVERTICULOSIS    CYSTORRHAPHY  04/04/2018    CYSTOSCOPY N/A 10/28/2020    CYSTOSCOPY performed by [facility]usha Smith MD at [facility] 2017    CATARACTS W/ IOL    HARDWARE REMOVAL Right 07/05/2019    HARDWARE REMOVAL PINS  HUMERUS     HARDWARE REMOVAL Right 7/5/2019    HARDWARE REMOVAL PINS  HUMERUS  C-ARM performed by Ros Bearden MD at [id number] [place] [address]  05/31/2013    Dr. Paulino Waddell DX W/CELL WASHG [facility] N/A 6/5/2018    BRONCHOSCOPY performed by Sigifredo Walter MD at [facility] FLX W/REMOVAL LESION BY HOT BX FORCEPS N/A 4/25/2017    COLONOSCOPY POLYPECTOMY HOT BIOPSY performed by Kali Ramirez MD at [facility] 4/4/2018    CYSTOSCOPY performed by Jasmin Dougherty MD at [address] Right 5/28/2019    SHOULDER CLOSED REDUCTION PERCUTANEOUS PINNING RIGHT performed by Ros Bearden MD at [place] [address]  12/28/2016    gastritis, esophagitis,     UPPER GASTROINTESTINAL ENDOSCOPY N/A 8/29/2018    EGD BIOPSY performed by Kali Ramirez MD at [facility]       Medications:   Scheduled Meds:   darbepoetin albaro-polysorbate  60 mcg Subcutaneous Once per day on Wed    predniSONE  40 mg Oral Daily    furosemide  20 mg Intravenous BID    modafinil  100 mg Oral Daily    apixaban  2.5 mg Oral BID    albuterol  2.5 mg Nebulization 4x daily    amiodarone  200 mg Oral Daily    atorvastatin  20 mg Oral Daily    carbidopa-levodopa  1 tablet Oral 4x Daily    budesonide-formoterol  2 puff Inhalation BID    isosorbide mononitrate  30 mg Oral Daily    metoprolol tartrate  25 mg Oral Daily    pantoprazole  40 mg Oral QAM AC    rasagiline  1 mg Oral Daily    rOPINIRole  2 mg Oral TID    tiotropium  2 puff Inhalation Daily    sodium chloride flush  10 mL Intravenous 2 times per day    insulin lispro  0-6 Units Subcutaneous TID     insulin lispro  0-3 Units Subcutaneous Nightly     Continuous Infusions:   dextrose        Outpatient Medications Marked as Taking for the 10/22/20 encounter [facility] Encounter)   Medication Sig Dispense Refill    Cholecalciferol (VITAMIN D3) 1.25 MG (16924 UT) CAPS Take by mouth      QUEtiapine (SEROQUEL XR) 50 MG extended release tablet Take 50 mg by mouth nightly      apixaban (ELIQUIS) 5 MG TABS tablet Take 1 tablet by mouth 2 times daily (Patient taking differently: Take 2.5 mg by mouth 2 times daily ) 60 tablet     metoprolol tartrate (LOPRESSOR) 25 MG tablet Take 1 tablet by mouth daily (Patient taking differently: Take 12.5 mg by mouth 2 times daily ) 60 tablet 3    Calcium-Vitamin D-Vitamin K 650-12.5-40 MG-MCG-MCG CHEW Take 1 tablet by mouth daily as needed (low calcium intake)      Cyanocobalamin (B-12) 5000 MCG CAPS Take 5,000 mcg by mouth daily      tiotropium (SPIRIVA) 18 MCG inhalation capsule Inhale 18 mcg into the lungs daily      isosorbide mononitrate (IMDUR) 30 MG extended release tablet Take 1 tablet by mouth daily 30 tablet 0    fluticasone-vilanterol (BREO ELLIPTA) 100-25 MCG/INH AEPB inhaler Inhale 2 puffs into the lungs daily      albuterol (PROVENTIL) (2.5 MG/3ML) 0.083% nebulizer solution Take 3 mLs by nebulization 4 times daily 120 each 3    miconazole (MICOTIN) 2 % powder Apply topically 2 times daily. 45 g 1    melatonin 5 MG TABS tablet Take 5 mg by mouth nightly as needed (sleep)      furosemide (LASIX) 20 MG tablet Take 1 tablet by mouth daily 60 tablet 3    atorvastatin (LIPITOR) 20 MG tablet TAKE 1 TABLET DAILY 90 tablet 2    carbidopa-levodopa (SINEMET CR)  MG per extended release tablet Take 1 tablet by mouth 4 times daily 360 tablet 3    rOPINIRole (REQUIP) 2 MG tablet Take 1 tablet by mouth 3 times daily 270 tablet 3    pantoprazole (PROTONIX) 40 MG tablet TAKE 1 TABLET TWICE A DAY BEFORE MEALS (Patient taking differently: daily ) 180 tablet 3    linagliptin (TRADJENTA) 5 MG tablet Take 0.5 tablets by mouth daily (Patient taking differently: Take 5 mg by mouth daily ) 90 tablet 1    amiodarone (CORDARONE) 200 MG tablet Take 200 mg by mouth daily       magnesium oxide (MAG-OX) 400 (241.3 Mg) MG TABS tablet Take 1 tablet by mouth 2 times daily (Patient taking differently: Take 400 mg by mouth daily ) 30 tablet     senna (SENOKOT) 8.6 MG tablet Take 1-2 tablets by mouth nightly       ferrous sulfate 325 (65 Fe) MG EC tablet Take 1 tablet by mouth 2 times daily (with meals) 90 tablet 3    rasagiline mesylate 1 MG TABS Take 1 tablet by mouth daily         Allergies:   Dye [barium-containing compounds]; Pcn [penicillins]; and Bactrim [sulfamethoxazole-trimethoprim]    Social History:    reports that she quit smoking about 50 years ago. Her smoking use included cigarettes. She has a 30.00 pack-year smoking history. She has never used smokeless tobacco. She reports previous alcohol use. She reports that she does not use drugs. Family History:    family history includes Heart Disease in her mother; Heart Failure in her mother; High Blood Pressure in her mother; Hypertension in her mother.     Review of Systems: Constitutional: No fever/chills. HENT: No headache, neck pain or neck stiffnes. No sore throat or dysphagia. Eyes: No blurred vision. Respiratory: As above. Cardiovascular: As above. Gastrointestinal: Negative. Genitourinary: Negative  Endocrine: Negative. Musculoskeletal: Negative. Skin: Negative. Allergic/Immunologic: Negative. Neurologic: Negative. Hematological: Negative. Psychiatric: Negative. All other systems are are noted to be otherwise negative. Physical Exam:   BP (!) 143/57   Pulse 80   Temp 97.5 °F (36.4 °C) (Oral)   Resp 23 Comment: pt trig 96%, ti/tot 33%  Ht 4' 9\" (1.448 m)   Wt 188 lb 12.8 oz (85.6 kg)   LMP 04/03/2004 (Within Years)   SpO2 (S) (!) 74%   BMI 40.86 kg/m²     Intake/Output Summary (Last 24 hours) at 10/29/2020 2202  Last data filed at 10/29/2020 1941  Gross per 24 hour   Intake 360 ml   Output 1050 ml   Net -690 ml       GENERAL:  Alert, appropriate, oriented, in NAD. HEENT:  Head is atraumatic and normocephalic. No Pallor. No icterus. NECK: Supple without any thyromegaly. LUNGS: Generally decreased breath sounds. CARDIAC: S1, S2, irregular rhythm. .  ABD:  Soft non-tender . EXT: Trace edema. MS: No obvious deformities. SKIN: No obvious skin rashes. NEURO: No focal neurologic deficits    Labs/ Ancillary data:     CBC:   Recent Labs     10/28/20  0516 10/29/20  0530   WBC 11.5* 12.7*   HGB 8.8* 9.2*   * 138     BMP:    Recent Labs     10/27/20  1305 10/28/20  0516 10/29/20  0530    139 138   K 4.3 4.8 4.4   CL 96* 94* 93*   CO2 36* 37* 37*   BUN 49* 50* 55*   CREATININE 1.80* 1.89* 1.75*   GLUCOSE 237* 271* 264*       Impression :     Atrial fibrillation. Anemia. Chronic kidney disease. COPD exacerbation. Hypertension. Diabetes mellitus. Obesity. Plan :     Cardiac standpoint, she is stable to hold her anticoagulation to get her cystoscopy done. Agree with current management. We will follow patient with you.     Thank you very much for allowing us to participate in the care of this patient. Please call us with any questions.     Electronically signed by Meg Niño MD on 10/29/2020 at 10:02 PM

## 2020-10-30 NOTE — FLOWSHEET NOTE
Returned to room 1017 via cart, transferred to bed with total assist x3, patient groggy, Spo2 100% on 3L/NC, no resp difficulty noted, denies pain but states she doesn't want to go home, vitals obtained, patient positioned for comfort

## 2020-10-30 NOTE — PROGRESS NOTES
Nephrology Progress Note    Sharon Cain MD       Follow Up for (CC) : CKD3/4            SUBJECTIVE      Creatinine stable 1.75. Pt not assessed as she is in OR. ASSESSMENT     CKD stage 4 with baseline GFR of 20s ml/min. Creatinine is at baseline. Hypertension. Anemia. Hypoparathyroidism. History of CHF and COPD. Hypomagnesemia  Nephrolithiasis    Active Problems:    COPD exacerbation (HCC)  Resolved Problems:    * No resolved hospital problems. *    PLAN   Continue Lasix IV  20mg BID. On Aranesp. Target hgb 10-11.5. Magnesium level ok. Monitor GFR electrolytes  Optimization of patient's of pulmonary treatment as per pulmonary service noted  Urology consulted for hematuria, CT scan was remarkable for bilateral kidney stones, s/p cysto 10/28. Urology plans noted. Ok to increase eliquis to 5mg BID from renal standpoint once resumed. Outpatient renal follow-up needed and recommended. Please do not hesitate to call with questions. Pt seen in collaboration with Dr. Christensen [place].     Chief Complaint   Patient presents with    Shortness of Breath       Patient Active Problem List   Diagnosis    Controlled type 2 diabetes mellitus with stage 3 chronic kidney disease, without long-term current use of insulin (HCC)    Hyperlipidemia    Essential hypertension    Chronic leg pain    Paroxysmal atrial fibrillation (HCC)    Asthma    Gastroesophageal reflux disease without esophagitis    ECTOR on CPAP    Type 2 diabetes mellitus with complication, without long-term current use of insulin ([address]    Spinal stenosis in cervical region    Hypomagnesemia    Hyperphosphaturia    Hypocalcemia    Parkinson's disease ([address]    Acute cystitis with hematuria    Altered mental status    Iron deficiency anemia due to chronic blood loss    Morbid obesity with BMI of 40.0-44.9, adult (HCC)    Hyperplastic polyp of intestine    Diverticulosis    Panlobular emphysema (HCC)    Rapid atrial fibrillation (HCC)    CKD (chronic kidney disease) stage 3, GFR 30-59 ml/min    Anemia in chronic kidney disease    Chronic respiratory failure with hypoxia and hypercapnia (HCC)    Acute kidney injury superimposed on chronic kidney disease [facility]    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 coli UTI (urinary tract infection)    Nephrolithiasis    Candidal intertrigo    Venous insufficiency    Malabsorption    Anemia of chronic renal failure, stage 4 (severe) (HCC)    Iron deficiency    Coronary atherosclerosis    COPD exacerbation (HCC)    Restless leg syndrome    SIRS (systemic inflammatory response syndrome) (HCC)    Nausea and vomiting in adult    Bacterial UTI    Odynophagia    Esophageal dysphagia    Candida esophagitis (HCC)    Sepsis due to urinary tract infection (HCC)    Chronic respiratory failure with hypoxia (HCC)    Chronic diastolic congestive heart failure (HCC)    History of ESBL E. coli infection    Stage 4 chronic kidney disease (HCC)    Fever    Macrocytic anemia    Hypercalcemia    Monoclonal gammopathy of undetermined significance    Acute nonintractable headache    Anemia of chronic renal failure, stage 4 (severe) (HCC)    Traumatic closed displaced fracture of proximal end of right humerus, initial encounter    Urinary tract infection without hematuria    Metabolic encephalopathy    Chest pain    Acute kidney injury ([facility]    Right leg swelling    Hematuria    Chest pain, unspecified    Chronic obstructive pulmonary disease with (acute) exacerbation (HCC)       CURRENT MEDICATIONS / Allergies Stanley Arellano History / Family History     Current Facility-Administered Medications   Medication Dose Route Frequency Provider Last Rate Last Dose    [MAR Hold] fentaNYL (SUBLIMAZE) injection 25 mcg  25 mcg Intravenous Q5 Min PRN Zac Norman MD        [facility] Hold] ondansetron [facility] injection 4 mg  4 mg Intravenous Once PRN Zac Norman MD       [facility] lidocaine (XYLOCAINE) 2 % uro-jet    PRN Governor Sarita MD   20 mL at 10/30/20 1003    iopamidol (ISOVUE-300) 61 % injection    PRN Governor Sarita MD   15 mL at 10/30/20 1012    [MAR Hold] darbepoetin albaro-polysorbate (ARANESP) injection 60 mcg  60 mcg Subcutaneous Once per day on Wed Jones Hanna MD   60 mcg at 10/28/20 1725    [MAR Hold] predniSONE (DELTASONE) tablet 40 mg  40 mg Oral Daily ANSHUL Bear - CNP   40 mg at 10/29/20 0855    [MAR Hold] furosemide (LASIX) injection 20 mg  20 mg Intravenous BID Governor Sarita MD   20 mg at 10/29/20 1807    [MAR Hold] modafinil (PROVIGIL) tablet 100 mg  100 mg Oral Daily Governor Sarita MD   100 mg at 10/29/20 0854    [MAR Hold] apixaban (ELIQUIS) tablet 2.5 mg  2.5 mg Oral BID Governor Sarita MD   Stopped at 10/28/20 1118    [MAR Hold] melatonin tablet 3 mg  3 mg Oral Nightly PRN Governor Sarita MD   3 mg at 10/29/20 2204    [MAR Hold] albuterol (PROVENTIL) nebulizer solution 2.5 mg  2.5 mg Nebulization Q4H PRN Governor Sarita MD   2.5 mg at 10/23/20 0405    [MAR Hold] albuterol (PROVENTIL) nebulizer solution 2.5 mg  2.5 mg Nebulization 4x daily Governor Sarita MD   2.5 mg at 10/30/20 0557    [MAR Hold] ALPRAZolam Meseret Mariann) tablet 0.25 mg  0.25 mg Oral BID PRN Governor Sarita MD   0.25 mg at 10/28/20 2226    [MAR Hold] amiodarone (CORDARONE) tablet 200 mg  200 mg Oral Daily Governor Sarita MD   200 mg at 10/29/20 0857    [MAR Hold] atorvastatin (LIPITOR) tablet 20 mg  20 mg Oral Daily Governor Sarita MD   20 mg at 10/29/20 0854    [MAR Hold] carbidopa-levodopa (SINEMET CR)  MG per extended release tablet 1 tablet  1 tablet Oral 4x Daily Governor Sarita MD   1 tablet at 10/1945    [MAR Hold] budesonide-formoterol (SYMBICORT) 160-4.5 MCG/ACT inhaler 2 puff  2 puff Inhalation BID Governor Sarita MD   2 puff at 10/30/20 0558    [MAR Hold] isosorbide mononitrate (IMDUR) extended release tablet 30 mg  30 mg Oral Daily Governor Stein (HUMALOG) injection vial 0-3 Units  0-3 Units Subcutaneous Nightly Eunice Troy MD   2 Units at 10/1945     Facility-Administered Medications Ordered in Other Encounters   Medication Dose Route Frequency Provider Last Rate Last Dose    midazolam (VERSED) injection    PRN Montserratian Gurney, APRN - CRNA   2 mg at 10/30/20 0933    fentaNYL (SUBLIMAZE) injection    PRN Montserratian Gurney, APRN - CRNA   25 mcg at 10/30/20 1013    propofol injection    PRN Montserratian Gurney, APRN - CRNA   120 mg at 10/30/20 3960    rocuronium (ZEMURON) injection    PRN Montserratian Gurney, APRN - CRNA   30 mg at 10/30/20 4348    lactated ringers infusion    Continuous PRN Montserratian Gurney, APRN - CRNA        ciprofloxacin (CIPRO) IVPB   Intravenous PRN Montserratian Gurney, APRN - CRNA   400 mg at 10/30/20 0955    ondansetron (ZOFRAN) injection    PRN Montserratian Gurney, APRN - CRNA   4 mg at 10/30/20 0954    lidocaine PF 2 % injection    PRN Montserratian Gurney, APRN - CRNA   100 mg at 10/30/20 8338    neostigmine (PROSTIGMINE) injection    PRN Montserratian Gurney, APRN - CRNA   3 mg at 10/30/20 1039    glycopyrrolate (ROBINUL) injection    PRN Montserratian Gurney, APRN - CRNA   0.6 mg at 10/30/20 1039    naloxone [facility]) injection    PRN Montserratian Gurney, APRN - CRNA   0.08 mg at 10/30/20 1100          Allergies   Allergen Reactions    Dye [Barium-Containing Compounds] Other (See Comments)     Cause Afib per     Pcn [Penicillins] Itching and Swelling    Bactrim [Sulfamethoxazole-Trimethoprim] Other (See Comments)     Allergic Nephritis       Social History     Socioeconomic History    Marital status:      Spouse name: Not on file    Number of children: Not on file    Years of education: Not on file    Highest education level: Not on file   Occupational History    Not on file   Social Needs    Financial resource strain: Not on file    Food insecurity     Worry: Not on file     Inability: Not on file    Transportation needs     Medical: 31.5*   .3* 107.1* 106.4*   * 138 116*     BMP:    Recent Labs     10/28/20  0516 10/29/20  0530 10/30/20  0547    138 141   K 4.8 4.4 4.0   CL 94* 93* 94*   CO2 37* 37* 36*   BUN 50* 55* 55*   CREATININE 1.89* 1.75* 1.75*   GLUCOSE 271* 264* 145*     CMP:   Recent Labs     10/28/20  0516 10/29/20  0530 10/30/20  0547    138 141   K 4.8 4.4 4.0   CL 94* 93* 94*   CO2 37* 37* 36*   BUN 50* 55* 55*   CREATININE 1.89* 1.75* 1.75*   GLUCOSE 271* 264* 145*   CALCIUM 6.9* 6.6* 6.3*                     URINALYSIS/URINE CHEMISTRIES      URINE SODIUM:    Lab Results   Component Value Date    EDWARD 46 03/04/2020      URINE OSMOLARITY:  No results found for: OSMOU  URINE CREATININE:    Lab Results   Component Value Date    LABCREA 79.1 03/04/2020     URINE EOSINOPHILS: No components found for: EOSU  URINALYSIS:  U/A:     RADIOLOGY      Results for orders placed during the hospital encounter of 02/28/20   US RENAL COMPLETE    Narrative EXAMINATION:  RETROPERITONEAL ULTRASOUND OF THE KIDNEYS AND URINARY BLADDER    3/3/2020    COMPARISON:  CT renal stone protocol from 02/12/2020    HISTORY:  ORDERING SYSTEM PROVIDED HISTORY: increased creatinine  TECHNOLOGIST PROVIDED HISTORY:  increased creatinine    72-year-old female with elevated creatinine    FINDINGS:    Kidneys:    Exam limited due to patient's inability to hold breath. Right kidney measures 8.7 x 4.3 x 4.4 cm. Right renal cortical thickness  measures 1.1 cm. Left kidney measures 10.2 x 4.5 x 4.3 cm. Left renal cortical thickness  measures 1.1 cm. Echogenic foci with acoustic shadowing are seen in the bilateral kidneys  likely representing bilateral renal calculi and/or renal vascular  calcifications with the largest in the medial right renal hilum measuring 1.3  cm. No hydronephrosis or perinephric fluid. Gross preservation of the corticomedullary differentiation. Gallstones and gallbladder sludge.       Bladder:    Patient voided immediately prior to the exam.  Small postvoid residual.      Impression 1. Bilateral renal calculi and/or renal vascular calcifications. No  hydronephrosis. 2. Gallstones and gallbladder sludge. 3. Small postvoid residual.  4. Exam limited due to patient's inability to breath hold. [address] [facility], [address] [facility]. Ph: [phone]    Physician Addendum     I reviewed and agree with CNP's note.    I agree with the plan of care as noted above.     Electronically signed by Tre Sidhu MD on 10/30/20 12:07 PM

## 2020-10-30 NOTE — PROGRESS NOTES
Subjective:     Follow-up type 2 diabetes  No polyuria no polydipsia no hypoglycemia blood sugars reviewed  ROS  Fever no chills no GI/ complaints still with some shortness of breath no TIA no bleeding no headache no sore throat no skin lesions  physical exam  General Appearance: alert and oriented to person, place and time and in no acute distress  Skin: warm and dry, no rash or erythema  Head: normocephalic and atraumatic  Eyes: pupils equal, round, and reactive to light and sclera anicteric  Neck: neck supple and non tender without mass   Pulmonary/Chest: clear to auscultation bilaterally- no wheezes, rales or rhonchi, normal air movement, no respiratory distress  Cardiovascular: normal rate, regular rhythm, normal S1 and S2, no gallops, intact distal pulses and no carotid bruits  Abdomen: soft, non-tender, non-distended, normal bowel sounds, no masses or organomegaly  Extremities: no edema and good pulses no Homans' sign  Neurologic: Alert oriented x3 with no focal deficit    BP (!) 112/51   Pulse 76   Temp 97.7 °F (36.5 °C) (Axillary)   Resp 20   Ht 4' 9\" (1.448 m)   Wt 189 lb 11.2 oz (86 kg)   LMP 04/03/2004 (Within Years)   SpO2 100%   BMI 41.05 kg/m²     CBC:   Lab Results   Component Value Date    WBC 14.2 10/30/2020    RBC 2.96 10/30/2020    HGB 9.2 10/30/2020    HCT 31.5 10/30/2020    .4 10/30/2020    MCH 31.1 10/30/2020    MCHC 29.2 10/30/2020    RDW 15.9 10/30/2020     10/30/2020    MPV 10.8 10/30/2020     BMP:    Lab Results   Component Value Date     10/30/2020    K 4.0 10/30/2020    CL 94 10/30/2020    CO2 36 10/30/2020    BUN 55 10/30/2020    LABALBU 3.8 09/23/2020    LABALBU Detected 11/30/2018    CREATININE 1.75 10/30/2020    CALCIUM 6.3 10/30/2020    GFRAA 35 10/30/2020    LABGLOM 28 10/30/2020    GLUCOSE 145 10/30/2020    GLUCOSE 132 03/07/2012        Assessment:  Patient Active Problem List   Diagnosis    Controlled type 2 diabetes mellitus with stage 3 chronic kidney disease, without long-term current use of insulin (HCC)    Hyperlipidemia    Essential hypertension    Chronic leg pain    Paroxysmal atrial fibrillation (HCC)    Asthma    Gastroesophageal reflux disease without esophagitis    ECTOR on CPAP    Type 2 diabetes mellitus with complication, without long-term current use of insulin (HCC)    Spinal stenosis in cervical region    Hypomagnesemia    Hyperphosphaturia    Hypocalcemia    Parkinson's disease ([facility] [address]    Acute cystitis with hematuria    Altered mental status    Iron deficiency anemia due to chronic blood loss    Morbid obesity with BMI of 40.0-44.9, adult (HCC)    Hyperplastic polyp of intestine    Diverticulosis    Panlobular emphysema (HCC)    Rapid atrial fibrillation (HCC)    CKD (chronic kidney disease) stage 3, GFR 30-59 ml/min    Anemia in chronic kidney disease    Chronic respiratory failure with hypoxia and hypercapnia (HCC)    Acute kidney injury superimposed on chronic kidney disease (HCC)    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 coli UTI (urinary tract infection)    Nephrolithiasis    Candidal intertrigo    Venous insufficiency    Malabsorption    Anemia of chronic renal failure, stage 4 (severe) (HCC)    Iron deficiency    Coronary atherosclerosis    COPD exacerbation (HCC)    Restless leg syndrome    SIRS (systemic inflammatory response syndrome) (HCC)    Nausea and vomiting in adult    Bacterial UTI    Odynophagia    Esophageal dysphagia    Candida esophagitis (HCC)    Sepsis due to urinary tract infection (HCC)    Chronic respiratory failure with hypoxia (HCC)    Chronic diastolic congestive heart failure (HCC)    History of ESBL E. coli infection    Stage 4 chronic kidney disease (HCC)    Fever    Macrocytic anemia    Hypercalcemia    Monoclonal gammopathy of undetermined significance    Acute nonintractable headache    Anemia of chronic renal failure, stage 4 (severe) [facility])    Traumatic closed displaced fracture of proximal end of right humerus, initial encounter    Urinary tract infection without hematuria    Metabolic encephalopathy    Chest pain    Acute kidney injury ([address]    Right leg swelling    Hematuria    Chest pain, unspecified    Chronic obstructive pulmonary disease with (acute) exacerbation (HCC)     Acute on chronic respiratory failure with hypoxia hypercapnia  ECTOR on CPAP  CKD 4  COPD exacerbation  Paroxysmal atrial fibrillation  Hematuria  Right renal calculi  Parkinson's  Morbid obesity excess calories  Macrocytic anemia  Type 2 diabetes with hyperglycemia and renal complications  Plan:    Meds labs reviewed patient had cystoscopy with laser treatment stent placed in the ureters doing fairly well check before meals and at bedtime Accu-Cheks with insulin coverage has been off Eliquis will restart in the morning avoid nephrotoxic drugs see orders      Forrester [place]  MD  6:33 PM

## 2020-10-30 NOTE — PROGRESS NOTES
Called to patient room due to a sat of 74% on normal O2 (3L). Pt presenting with very diminshed breath sounds L>R and increased working of breathing. Pt at baseline and alert and oriented. Pt placed on BiPAP. Reviewing patient chart she has already received her second dose of lasix tonight and has had over 1000 mL out since change of shift per RN>  Pt sat rebounded immediately after bethany placed on BiPAP. Stat CXR results pending.

## 2020-10-30 NOTE — FLOWSHEET NOTE
Patient caught up on medications, continues to deny pain,  in and updated at bedside, focal assessment as charted

## 2020-10-30 NOTE — PROGRESS NOTES
Cardiovascular progress Note          Patient name: Sharon Fischer    YOB: 1945  Date of admission:  10/22/2020       Patient seen, examined. Previous clinical entries reviewed. All available laboratory, imaging and ancillary data reviewed. Subjective:      She had cystoscopy. Tolerated procedure well. She is extubated. She is alert and oriented x3. Systems review:  Constitutional: No fever/chills. HENT: No headache, neck pain or neck stiffness. No sore throat or dysphagia. Gastrointestinal: No abdominal pain, nausea or vomiting. Cardiac: As Above  Respiratory: As above  Neurologic: No new focal weakness or numbness  Psychiatric: Normal mood and mentation       Examination:   Vitals: BP (!) 112/51   Pulse 76   Temp 97.7 °F (36.5 °C) (Axillary)   Resp 20   Ht 4' 9\" (1.448 m)   Wt 189 lb 11.2 oz (86 kg)   LMP 04/03/2004 (Within Years)   SpO2 100%   BMI 41.05 kg/m²     Intake/Output Summary (Last 24 hours) at 10/30/2020 1847  Last data filed at 10/30/2020 1806  Gross per 24 hour   Intake 523 ml   Output 3350 ml   Net -2827 ml       General appearance: Comfortable in no apparent distress. HEENT: Positive for  pallor. No icterus  Neck: Supple. Lungs:Generally decreased breath sounds  Heart: S1,S2  Abdomen: Soft  Extremities: No peripheral edema  Skin: No obvious rashes. Musculoskeletal: No obvious deformities. Neurologic: No focal deficits.      Labs/ Ancillary data:     CBC:   Recent Labs     10/28/20  0516 10/29/20  0530 10/30/20  0547   WBC 11.5* 12.7* 14.2*   HGB 8.8* 9.2* 9.2*   * 138 116*     BMP:    Recent Labs     10/28/20  0516 10/29/20  0530 10/30/20  0547    138 141   K 4.8 4.4 4.0   CL 94* 93* 94*   CO2 37* 37* 36*   BUN 50* 55* 55*   CREATININE 1.89* 1.75* 1.75*   GLUCOSE 271* 264* 145*       Medications:   Scheduled Meds:   darbepoetin albaro-polysorbate  60 mcg Subcutaneous Once per day on Wed    predniSONE  40 mg Oral Daily    furosemide  20 mg Intravenous BID    modafinil  100 mg Oral Daily    apixaban  2.5 mg Oral BID    albuterol  2.5 mg Nebulization 4x daily    amiodarone  200 mg Oral Daily    atorvastatin  20 mg Oral Daily    carbidopa-levodopa  1 tablet Oral 4x Daily    budesonide-formoterol  2 puff Inhalation BID    isosorbide mononitrate  30 mg Oral Daily    metoprolol tartrate  25 mg Oral Daily    pantoprazole  40 mg Oral QAM AC    rasagiline  1 mg Oral Daily    rOPINIRole  2 mg Oral TID    tiotropium  2 puff Inhalation Daily    sodium chloride flush  10 mL Intravenous 2 times per day    insulin lispro  0-6 Units Subcutaneous TID WC    insulin lispro  0-3 Units Subcutaneous Nightly     Continuous Infusions:   dextrose       Assessment/ Plan :   Proximal atrial fibrillation-currently in sinus rhythm. Acute exacerbation. Hypertension. Hyperlipidemia. Diabetes mellitus. CKD    From cardiac standpoint, Eliquis can be restarted whenever it safe from the surgical standpoint. No objections to discharge planning. Thank you very much for allowing us to participate in the care of this patient. Please call us with any questions.     Electronically signed by Meghan Hall MD on 10/30/2020 at 6:49 PM

## 2020-10-30 NOTE — FLOWSHEET NOTE
Call placed to [id number] [facility] sleep lab, spoke with Leigha Oliveros regarding [place] testing required prior to sleep study, states that patient must have test 3-4 days prior to test, states that she can have \"someone\" come to the facility to complete test prior to patient being discharged

## 2020-10-30 NOTE — PLAN OF CARE
Problem: Falls - Risk of:  Goal: Will remain free from falls  Description: Will remain free from falls  Outcome: Ongoing  Note: Patient is fall risk per fall scale. Hourly rounding performed. Personal belongings and call light within reach. Bed in low position.

## 2020-10-30 NOTE — OP NOTE
Operative Note      Patient: Rei Teresa  YOB: 1945  MRN: [id number]    Date of Procedure: 10/30/2020    Pre-Op Diagnosis: Right  renal calculi    Post-Op Diagnosis: Same       Procedure(s):  CYSTOSCOPY PYELOGRAM WITH HOLMIUM LASER RIGHT STENT INSERTION    Surgeon(s):  Levon Xavier MD    Assistant:   * No surgical staff found *    Anesthesia: General    Estimated Blood Loss (mL): Minimal    Complications: None    Specimens:   ID Type Source Tests Collected by Time Destination   1 : URINE Urine Urine, Cystoscopic CULTURE, URINE Levon Xavier MD 10/30/2020 1003        Implants:  Implant Name Type Inv. Item Serial No.  Lot No. LRB No. Used Action   [employer] [id number] Stent:Urological [employer] [id number]  [id number] SCI: Cheryl Cruz [id number] Right 1 Implanted         Drains:   Urethral Catheter Double-lumen (Active)   Catheter Indications Urology/Urologist seeing this patient or inserted indwelling catheter 10/30/20 1127   Site Assessment RONNELL 10/30/20 1127   Urine Color Bloody 10/30/20 1127   Urine Appearance Cloudy 10/30/20 1127       [REMOVED] External Urinary Catheter (Removed)       Indications: 56-year-old female with gross hematuria, patient on Eliquis, cystoscopy demonstrated active bleeding from the right kidney. CT imaging confirmed presence of multiple stones in the right kidney. Patient presently scheduled for cystoscopy retrograde pyelogram stent placement and laser lithotripsy    Detailed Description of Procedure:   Patient was brought to the operating room, positioned in dorsolithotomy, proper patient identification procedure identification prepping and draping in the usual sterile manner.     We entered the bladder with the cystoscope, we examined the bladder carefully no tumors ulcers or stones, we used a #8 cone-tip ureteral catheter and we performed retrograde pyelography, the left ureter is

## 2020-10-30 NOTE — ANESTHESIA PRE PROCEDURE
Department of Anesthesiology  Preprocedure Note       Name:  Arnaldo Angulo   Age:  70 y.o.  :  1945                                          MRN:  [id number]         Date:  10/30/2020      Surgeon: Jaun Mccullough):  Mariajose Norwood MD    Procedure: Procedure(s):  CYSTOSCOPY WITH HOLMIUM LASER    Medications prior to admission:   Prior to Admission medications    Medication Sig Start Date End Date Taking? Authorizing Provider   Cholecalciferol (VITAMIN D3) 1.25 MG (21020 UT) CAPS Take by mouth   Yes Historical Provider, MD   QUEtiapine (SEROQUEL XR) 50 MG extended release tablet Take 50 mg by mouth nightly   Yes Historical Provider, MD   apixaban (ELIQUIS) 5 MG TABS tablet Take 1 tablet by mouth 2 times daily  Patient taking differently: Take 2.5 mg by mouth 2 times daily  3/4/20  Yes Trev Steen MD   metoprolol tartrate (LOPRESSOR) 25 MG tablet Take 1 tablet by mouth daily  Patient taking differently: Take 12.5 mg by mouth 2 times daily  3/5/20  Yes Trev Steen MD   Calcium-Vitamin D-Vitamin K 650-12.5-40 MG-MCG-MCG CHEW Take 1 tablet by mouth daily as needed (low calcium intake)   Yes Historical Provider, MD   Cyanocobalamin (B-12) 5000 MCG CAPS Take 5,000 mcg by mouth daily   Yes Historical Provider, MD   tiotropium (SPIRIVA) 18 MCG inhalation capsule Inhale 18 mcg into the lungs daily   Yes Historical Provider, MD   isosorbide mononitrate (IMDUR) 30 MG extended release tablet Take 1 tablet by mouth daily 1/3/20  Yes Trev Steen MD   fluticasone-vilanterol (BREO ELLIPTA) 100-25 MCG/INH AEPB inhaler Inhale 2 puffs into the lungs daily   Yes Historical Provider, MD   albuterol (PROVENTIL) (2.5 MG/3ML) 0.083% nebulizer solution Take 3 mLs by nebulization 4 times daily 19  Yes Trev Steen MD   miconazole (MICOTIN) 2 % powder Apply topically 2 times daily.  19  Yes Trev Steen MD   melatonin 5 MG TABS tablet Take 5 mg by mouth nightly as needed (sleep)   Yes Historical Provider, MD furosemide (LASIX) 20 MG tablet Take 1 tablet by mouth daily 5/30/19  Yes Ruddy Montoya MD   atorvastatin (LIPITOR) 20 MG tablet TAKE 1 TABLET DAILY 4/18/19  Yes Lesly Hill DO   carbidopa-levodopa (SINEMET CR)  MG per extended release tablet Take 1 tablet by mouth 4 times daily 4/9/19  Yes Lesly Hill DO   rOPINIRole (REQUIP) 2 MG tablet Take 1 tablet by mouth 3 times daily 4/9/19  Yes Lesly Hill DO   pantoprazole (PROTONIX) 40 MG tablet TAKE 1 TABLET TWICE A DAY BEFORE MEALS  Patient taking differently: daily  2/7/19  Yes Lesly Hill DO   linagliptin (TRADJENTA) 5 MG tablet Take 0.5 tablets by mouth daily  Patient taking differently: Take 5 mg by mouth daily  1/16/19  Yes Lesly Hill DO   amiodarone (CORDARONE) 200 MG tablet Take 200 mg by mouth daily    Yes Historical Provider, MD   magnesium oxide (MAG-OX) 400 (241.3 Mg) MG TABS tablet Take 1 tablet by mouth 2 times daily  Patient taking differently: Take 400 mg by mouth daily  9/7/18  Yes Godfrey Marcos DO   senna (SENOKOT) 8.6 MG tablet Take 1-2 tablets by mouth nightly    Yes Historical Provider, MD   ferrous sulfate 325 (65 Fe) MG EC tablet Take 1 tablet by mouth 2 times daily (with meals) 12/21/17  Yes Jose Perez DO   rasagiline mesylate 1 MG TABS Take 1 tablet by mouth daily   Yes Historical Provider, MD   calcitRIOL (ROCALTROL) 0.25 MCG capsule Take 0.75 mcg by mouth daily     Historical Provider, MD Forrester Katie strip 1 each by In Vitro route daily As needed. 3/4/19   Lesly Hill DO   ONE TOUCH ULTRA TEST strip USE 1 STRIP THREE TIMES A DAY 12/26/18   Lesly Hill DO   Oxygen Concentrator Dx: Pneumonia, use as directed. Patient taking differently: Dx: Pneumonia, use as directed.    ON 24/7 2/10/17   Teresa Hill DO       Current medications:    Current Facility-Administered Medications   Medication Dose Route Frequency Provider Last Rate Last Dose    darbepoetin albaro-polysorbate (ARANESP) injection 60 mcg Giovanni Lara MD   2 mg at 10/1945    tiotropium (SPIRIVA RESPIMAT) 2.5 MCG/ACT inhaler 2 puff  2 puff Inhalation Daily Giovanni Lara MD   2 puff at 10/30/20 9369    sodium chloride flush 0.9 % injection 10 mL  10 mL Intravenous 2 times per day Giovanni Lara MD   10 mL at 10/29/20 1949    sodium chloride flush 0.9 % injection 10 mL  10 mL Intravenous PRN Giovanni Lara MD        acetaminophen (TYLENOL) tablet 650 mg  650 mg Oral Q6H PRN Giovanni Lara MD   650 mg at 10/28/20 1731    Or    acetaminophen (TYLENOL) suppository 650 mg  650 mg Rectal Q6H PRN Giovanni Lara MD        polyethylene glycol [facility]) packet 17 g  17 g Oral Daily PRN Giovanni Lara MD        promethazine (PHENERGAN) tablet 12.5 mg  12.5 mg Oral Q6H PRN Giovanni Lara MD        Or    [facility]dansetron [facility] injection 4 mg  4 mg Intravenous Q6H PRN Giovanni Lara MD   4 mg at 10/26/20 2214    glucose (GLUTOSE) 40 % oral gel 15 g  15 g Oral PRN Giovanni Lara MD        dextrose 50 % IV solution  12.5 g Intravenous PRN Giovanni Lara MD        glucagon (rDNA) injection 1 mg  1 mg Intramuscular PRN Giovanni Lara MD        dextrose 5 % solution  100 mL/hr Intravenous PRN Giovanni Lara MD        insulin lispro (HUMALOG) injection vial 0-6 Units  0-6 Units Subcutaneous TID WC Giovanni Lara MD   5 Units at 10/29/20 1806    insulin lispro (HUMALOG) injection vial 0-3 Units  0-3 Units Subcutaneous Nightly Giovanni Lara MD   2 Units at 10/1945       Allergies:     Allergies   Allergen Reactions    Dye [Barium-Containing Compounds] Other (See Comments)     Cause Afib per     Pcn [Penicillins] Itching and Swelling    Bactrim [Sulfamethoxazole-Trimethoprim] Other (See Comments)     Allergic Nephritis       Problem List:    Patient Active Problem List   Diagnosis Code    Controlled type 2 diabetes mellitus with stage 3 chronic kidney disease, without long-term current use of insulin ([facility]) E11.22, N18.30  Hyperlipidemia E78.5    Essential hypertension I10    Chronic leg pain M79.606, G89.29    Paroxysmal atrial fibrillation ([facility]) I48.0    Asthma J45.909    Gastroesophageal reflux disease without esophagitis K21.9    ECTOR on CPAP G47.33, Z99.89    Type 2 diabetes mellitus with complication, without long-term current use of insulin ([facility]) E11.8    Spinal stenosis in cervical region M48.02    Hypomagnesemia E83.42    Hyperphosphaturia E83.39    Hypocalcemia E83.51    Parkinson's disease ([facility] [address] G20    Acute cystitis with hematuria N30.01    Altered mental status R41.82    Iron deficiency anemia due to chronic blood loss D50.0    Morbid obesity with BMI of 40.0-44.9, adult ([facility]) E66.01, Z68.41    Hyperplastic polyp of intestine K63.5    Diverticulosis K57.90    Panlobular emphysema ([facility]) J43.1    Rapid atrial fibrillation ([facility]) I48.91    CKD (chronic kidney disease) stage 3, GFR 30-59 ml/min N18.30    Anemia in chronic kidney disease N18.9, D63.1    Chronic respiratory failure with hypoxia and hypercapnia ([facility]) J96.11, J96.12    Acute kidney injury superimposed on chronic kidney disease ([facility]) N17.9, N18.9    CKD stage 4 due to type 2 diabetes mellitus ([facility]) E11.22, N18.4    E. coli UTI (urinary tract infection) N39.0, B96.20    Nephrolithiasis N20.0    Candidal intertrigo B37.2    Venous insufficiency I87.2    Malabsorption K90.9    Anemia of chronic renal failure, stage 4 (severe) ([facility]) N18.4, D63.1    Iron deficiency E61.1    Coronary atherosclerosis I25.10    COPD exacerbation ([facility]) J44.1    Restless leg syndrome G25.81    SIRS (systemic inflammatory response syndrome) ([facility]) R65.10    Nausea and vomiting in adult R11.2    Bacterial UTI N39.0, A49.9    Odynophagia R13.10    Esophageal dysphagia R13.10    Candida esophagitis ([facility]) B37.81    Sepsis due to urinary tract infection ([facility]) A41.9, N39.0    Chronic respiratory failure with hypoxia ([facility]) J96.11    Chronic diastolic congestive heart failure (HCC) I50.32    History of ESBL E. coli infection Z86.19    Stage 4 chronic kidney disease (HCC) N18.4    Fever R50.9    Macrocytic anemia D53.9    Hypercalcemia E83.52    Monoclonal gammopathy of undetermined significance D47.2    Acute nonintractable headache R51.9    Anemia of chronic renal failure, stage 4 (severe) ([facility]) N18.4, D63.1    Traumatic closed displaced fracture of proximal end of right humerus, initial encounter S42.201A    Urinary tract infection without hematuria D73.0    Metabolic encephalopathy J28.50    Chest pain R07.9    Acute kidney injury (HCC) N17.9    Right leg swelling M79.89    Hematuria R31.9    Chest pain, unspecified R07.9    Chronic obstructive pulmonary disease with (acute) exacerbation ([facility]) J44.1       Past Medical History:        Diagnosis Date    Acute on chronic diastolic congestive heart failure ([facility])     Anesthesia complication     DIFFICULTY WAKING OP    Asthma     Atrial fibrillation ([facility] [address] 2013    Cataracts, bilateral     Cellulitis     BILAT LOWER LEGS-PT STATES IS IMPROVING    Cerebral artery occlusion with cerebral infarction [facility])     Last stroke was February 2017 w/no deficits-TOTAL OF 3    CHF (congestive heart failure) ([facility])     Chronic kidney disease 2013    NOT ON DIALYSIS YET    Chronic kidney disease     Closed fracture of proximal end of right humerus with routine healing     Constipation     CHRONIC    Controlled type 2 diabetes mellitus with stage 3 chronic kidney disease, without long-term current use of insulin ([facility])     COPD (chronic obstructive pulmonary disease) ([facility] [address] 2008    PT. SEES DR. BECK. Home O2 at 2-3L/NC 24/7.     Difficult intravenous access     VEINS ROLL    Difficulty walking     AMBULATES WITH THERAPPY    Diverticulosis     DM2 (diabetes mellitus, type 2) ([facility] [address] 2008    Full dentures     FULL UPPER ONLY    GERD (gastroesophageal reflux disease) 2008    ON RX    H/O fall     at [facility] N/A 2018    CYSTOSCOPY performed by Anjelica Montalvo MD at [address] Right 2019    SHOULDER CLOSED REDUCTION PERCUTANEOUS PINNING RIGHT performed by Fabiola Munoz MD at [address] ENDOSCOPY  2016    gastritis, esophagitis,     UPPER GASTROINTESTINAL ENDOSCOPY N/A 2018    EGD BIOPSY performed by Reji Rose MD at [address] [facility] History:    Social History     Tobacco Use    Smoking status: Former Smoker     Packs/day: 2.00     Years: 15.00     Pack years: 30.00     Types: Cigarettes     Last attempt to quit: 10/31/1970     Years since quittin.0    Smokeless tobacco: Never Used   Substance Use Topics    Alcohol use: Not Currently                                Counseling given: Not Answered      Vital Signs (Current):   Vitals:    10/30/20 0008 10/30/20 0331 10/30/20 0549 10/30/20 0554   BP: (!) 115/56 (!) 141/43     Pulse: 68 80     Resp: 16 20     Temp: 97.3 °F (36.3 °C) 97.5 °F (36.4 °C)     TempSrc: Axillary Oral     SpO2: 97% 99%  99%   Weight:   189 lb 11.2 oz (86 kg)    Height:                                                  BP Readings from Last 3 Encounters:   10/30/20 (!) 141/43   10/15/20 135/71   20 (!) 109/48       NPO Status:                                                                                 BMI:   Wt Readings from Last 3 Encounters:   10/30/20 189 lb 11.2 oz (86 kg)   20 170 lb (77.1 kg)   20 173 lb 1.6 oz (78.5 kg)     Body mass index is 41.05 kg/m².     CBC:   Lab Results   Component Value Date    WBC 14.2 10/30/2020    RBC 2.96 10/30/2020    HGB 9.2 10/30/2020    HCT 31.5 10/30/2020    .4 10/30/2020    RDW 15.9 10/30/2020     10/30/2020       CMP:   Lab Results   Component Value Date     10/30/2020    K 4.0 10/30/2020    CL 94 10/30/2020    CO2 36 10/30/2020    BUN 55 10/30/2020 CREATININE 1.75 10/30/2020    GFRAA 35 10/30/2020    LABGLOM 28 10/30/2020    GLUCOSE 145 10/30/2020    GLUCOSE 132 03/07/2012    PROT 6.0 09/23/2020    CALCIUM 6.3 10/30/2020    BILITOT 0.18 09/23/2020    ALKPHOS 57 09/23/2020    AST 11 09/23/2020    ALT <5 09/23/2020       POC Tests:   Recent Labs     10/29/20  1920   POCGLU 335*       Coags:   Lab Results   Component Value Date    PROTIME 10.0 12/30/2019    INR 1.0 12/30/2019    APTT 27.5 12/30/2019       HCG (If Applicable): No results found for: PREGTESTUR, PREGSERUM, HCG, HCGQUANT     ABGs: No results found for: PHART, PO2ART, KSZ1NGN, BJH9ODP, BEART, K2ZPPHUK     Type & Screen (If Applicable):  No results found for: LABABO, LABRH    Drug/Infectious Status (If Applicable):  Lab Results   Component Value Date    HEPCAB NONREACTIVE 08/21/2018       COVID-19 Screening (If Applicable):   Lab Results   Component Value Date    COVID19 Not Detected 10/22/2020    COVID19 Not Detected 08/15/2020         Anesthesia Evaluation    Airway: Mallampati: I  TM distance: >3 FB   Neck ROM: full  Mouth opening: > = 3 FB Dental:          Pulmonary:   (+) sleep apnea:      (-) shortness of breath                           Cardiovascular:    (+) hypertension:, valvular problems/murmurs: AS, CAD:,     (-)  angina                Neuro/Psych:   (+) CVA: no interval change,             GI/Hepatic/Renal:             Endo/Other:    (+) Diabetes, . Abdominal:           Vascular:                                        Anesthesia Plan      general     ASA 4             Anesthetic plan and risks discussed with patient.                       Kody Gross MD   10/30/2020

## 2020-10-30 NOTE — PROGRESS NOTES
Occupational Therapy  DATE: 10/30/2020    NAME: Sherron Sofia  MRN: [id number]   : 1945  [facility]  Occupational Therapy Not Seen Note    Patient not available for Occupational Therapy due to:    [] Testing:    [] Hemodialysis    [x] Cancelled by RN: Patient at procedure.     []Refusal by Patient:    [x] Surgery:     [] Intubation:     [] Pain Medication:    [] Sedation:     [] Spine Precautions :    [] Medical Instability:    [] Other:       ELIZABETH Weston

## 2020-10-30 NOTE — ANESTHESIA POSTPROCEDURE EVALUATION
Department of Anesthesiology  Postprocedure Note    Patient: Rochelle Chin  MRN: [id number]  YOB: 1945  Date of evaluation: 10/30/2020  Time:  12:53 PM     Procedure Summary     Date:  10/30/20 Room / Location:  [facility] - INPATIENT    Anesthesia Start:  0231 Anesthesia Stop:  1120    Procedure:  CYSTOSCOPY PYELOGRAM WITH HOLMIUM LASER RIGHT STENT INSERTION (N/A Ureter) Diagnosis:  (renal calculi)    Surgeon:  Daryl Best MD Responsible Provider:  Aviva Rowan MD    Anesthesia Type:  general ASA Status:  4          Anesthesia Type: general    Shan Phase I: Shan Score: 7    Shan Phase II:      Last vitals: Reviewed and per EMR flowsheets.        Anesthesia Post Evaluation    Complications: no

## 2020-10-31 NOTE — DISCHARGE SUMMARY
Physician Discharge Summary     Patient ID:  Shaun Lechuga  [id number]  3 y.o.  1945    Admit date: 10/22/2020    Discharge date and time: 10/31/2020    Admission Diagnoses:   Patient Active Problem List   Diagnosis    Controlled type 2 diabetes mellitus with stage 3 chronic kidney disease, without long-term current use of insulin (HCC)    Hyperlipidemia    Essential hypertension    Chronic leg pain    Paroxysmal atrial fibrillation (HCC)    Asthma    Gastroesophageal reflux disease without esophagitis    ECTOR on CPAP    Type 2 diabetes mellitus with complication, without long-term current use of insulin (HCC)    Spinal stenosis in cervical region    Hypomagnesemia    Hyperphosphaturia    Hypocalcemia    [facility]'s [facility] [address]    Acute cystitis with hematuria    Altered mental status    Iron deficiency anemia due to chronic blood loss    Morbid obesity with BMI of 40.0-44.9, adult ([facility])    Hyperplastic polyp of intestine    Diverticulosis    Panlobular emphysema (HCC)    Rapid atrial fibrillation (HCC)    CKD (chronic kidney disease) stage 3, GFR 30-59 ml/min    Anemia in chronic kidney disease    Chronic respiratory failure with hypoxia and hypercapnia ([facility])    Acute kidney injury superimposed on chronic kidney disease ([facility] [address]    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 coli UTI (urinary tract infection)    Nephrolithiasis    Candidal intertrigo    Venous insufficiency    Malabsorption    Anemia of chronic renal failure, stage 4 (severe) ([facility])    Iron deficiency    Coronary atherosclerosis    COPD exacerbation ([facility])    Restless leg syndrome    SIRS (systemic inflammatory response syndrome) ([facility])    Nausea and vomiting in adult    Bacterial UTI    Odynophagia    Esophageal dysphagia    Candida esophagitis (HCC)    Sepsis due to urinary tract infection (HCC)    Chronic respiratory failure with hypoxia (HCC)    Chronic diastolic congestive heart failure ([facility] [address]    History of ESBL E. coli infection    Stage 4 chronic kidney disease (HCC)    Fever    Macrocytic anemia    Hypercalcemia    Monoclonal gammopathy of undetermined significance    Acute nonintractable headache    Anemia of chronic renal failure, stage 4 (severe) ([facility]    Traumatic closed displaced fracture of proximal end of right humerus, initial encounter    Urinary tract infection without hematuria    Metabolic encephalopathy    Chest pain    Acute kidney injury ([address]    Right leg swelling    Hematuria    Chest pain, unspecified    Chronic obstructive pulmonary disease with (acute) exacerbation ([facility])       Discharge Diagnoses:   Acute on chronic respiratory failure with hypoxia hypercapnia  ECTOR on CPAP  CKD 4  COPD exacerbation  Paroxysmal atrial fibrillation  Chronic anticoagulation  Hematuria  Right renal calculi  Parkinson's  Morbid obesity excess calories  Macrocytic anemia  Type 2 diabetes with hyperglycemia and renal complications  Hypocalcemia  Chronic diastolic CHF    Consults: cardiology, pulmonary/intensive care, nephrology and urology    Procedures: Cystoscopy    Hospital Course: 79-year-old Y gentlewoman with multiple medical problems comes in with shortness of breath found to have COPD exacerbation  Acute on chronic respiratory failure, was treated with empiric antibiotics steroids bronchodilators oxygen during her stay she had hematuria found to have renal calculi cystoscopy was done that renal stones were removed by laser treatment did fairly well no major complications during her stay she had physical therapy occupational therapy, and was found to have hypocalcemia calcium gluconate was given as well as oral calcium she was discharged home with visiting home nurse home physical therapy occupational therapy up with a walker    Discharge Exam:  See progress note from today    Disposition: home  Stable improved  Patient Instructions:   Current Discharge Medication List Oxygen 2 to 3 l nasal cannula   START taking these medications    Details   polyethylene glycol (GLYCOLAX) 17 g packet Take 17 g by mouth daily as needed for Constipation  Qty: 527 g, Refills: 0      Calcium Carb-Cholecalciferol (OYSTER SHELL CALCIUM/VITAMIN D) 250-125 MG-UNIT per tablet Take 2 tablets by mouth daily  Qty: 30 tablet, Refills: 0      !! predniSONE (DELTASONE) 20 MG tablet Take 1.5 tablets by mouth daily for 3 days  Qty: 5 tablet, Refills: 0      !! predniSONE (DELTASONE) 10 MG tablet Take 2 tablets by mouth daily for 3 days  Qty: 6 tablet, Refills: 0      !! predniSONE (DELTASONE) 10 MG tablet Take 1 tablet by mouth daily for 3 days  Qty: 3 tablet, Refills: 0      !! predniSONE (DELTASONE) 5 MG tablet Take 2 tablets by mouth every 48 hours for 3 doses  Qty: 6 tablet, Refills: 0       !! - Potential duplicate medications found. Please discuss with provider.       CONTINUE these medications which have CHANGED    Details   apixaban (ELIQUIS) 2.5 MG TABS tablet Take 1 tablet by mouth 2 times daily  Qty: 60 tablet, Refills: 0         CONTINUE these medications which have NOT CHANGED    Details   QUEtiapine (SEROQUEL XR) 50 MG extended release tablet Take 50 mg by mouth nightly      metoprolol tartrate (LOPRESSOR) 25 MG tablet Take 1 tablet by mouth daily  Qty: 60 tablet, Refills: 3      Calcium-Vitamin D-Vitamin K 650-12.5-40 MG-MCG-MCG CHEW Take 1 tablet by mouth daily as needed (low calcium intake)      Cyanocobalamin (B-12) 5000 MCG CAPS Take 5,000 mcg by mouth daily      tiotropium (SPIRIVA) 18 MCG inhalation capsule Inhale 18 mcg into the lungs daily      isosorbide mononitrate (IMDUR) 30 MG extended release tablet Take 1 tablet by mouth daily  Qty: 30 tablet, Refills: 0      fluticasone-vilanterol (BREO ELLIPTA) 100-25 MCG/INH AEPB inhaler Inhale 2 puffs into the lungs daily      albuterol (PROVENTIL) (2.5 MG/3ML) 0.083% nebulizer solution Take 3 mLs by nebulization 4 times daily  Qty: 120 each, Refills: 3      miconazole (MICOTIN) 2 % powder Apply topically 2 times daily. Qty: 45 g, Refills: 1      melatonin 5 MG TABS tablet Take 5 mg by mouth nightly as needed (sleep)      furosemide (LASIX) 20 MG tablet Take 1 tablet by mouth daily  Qty: 60 tablet, Refills: 3      atorvastatin (LIPITOR) 20 MG tablet TAKE 1 TABLET DAILY  Qty: 90 tablet, Refills: 2      carbidopa-levodopa (SINEMET CR)  MG per extended release tablet Take 1 tablet by mouth 4 times daily  Qty: 360 tablet, Refills: 3    Associated Diagnoses: Parkinson's disease ([facility])      rOPINIRole (REQUIP) 2 MG tablet Take 1 tablet by mouth 3 times daily  Qty: 270 tablet, Refills: 3    Associated Diagnoses: Parkinson's disease ([facility])      pantoprazole (PROTONIX) 40 MG tablet TAKE 1 TABLET TWICE A DAY BEFORE MEALS  Qty: 180 tablet, Refills: 3    Associated Diagnoses: Gastroesophageal reflux disease without esophagitis      linagliptin (TRADJENTA) 5 MG tablet Take 0.5 tablets by mouth daily  Qty: 90 tablet, Refills: 1    Associated Diagnoses: Controlled type 2 diabetes mellitus with stage 3 chronic kidney disease, without long-term current use of insulin ([facility])      amiodarone (CORDARONE) 200 MG tablet Take 200 mg by mouth daily       magnesium oxide (MAG-OX) 400 (241.3 Mg) MG TABS tablet Take 1 tablet by mouth 2 times daily  Qty: 30 tablet      senna (SENOKOT) 8.6 MG tablet Take 1-2 tablets by mouth nightly       ferrous sulfate 325 (65 Fe) MG EC tablet Take 1 tablet by mouth 2 times daily (with meals)  Qty: 90 tablet, Refills: 3      rasagiline mesylate 1 MG TABS Take 1 tablet by mouth daily      calcitRIOL (ROCALTROL) 0.25 MCG capsule Take 0.75 mcg by mouth daily       !! ONETOUCH VERIO strip 1 each by In Vitro route daily As needed. Qty: 100 each, Refills: 3      !! ONE TOUCH ULTRA TEST strip USE 1 STRIP THREE TIMES A DAY  Qty: 300 each, Refills: 3      Oxygen Concentrator Dx: Pneumonia, use as directed.   Qty: 1 each, Refills: 0       !! - Potential duplicate medications found. Please discuss with provider. STOP taking these medications       Cholecalciferol (VITAMIN D3) 1.25 MG (70467 UT) CAPS Comments:   Reason for Stopping:             Activity: Up with a walker  Diet: diabetic diet and No added salt 1800 cc fluid restriction    Follow-up with pcP in 1 week. Pulmonary in 2 weeks nephrology in 2 weeks advised to avoid nephrotoxic drugs including NSAIDs precautions with anticoagulation advised, community referral completed more than 30 minutes spent on dischargeSigned:   Usama Pollack MD  10/31/2020  2:16 PM

## 2020-10-31 NOTE — PROGRESS NOTES
Intermittent atypical pleuritic chest pain  Dyspnea is unchanged  NSR  BP is normal  Diminished BS with rhonchi  RRR  No peripheral edema    Pt is in NSR  Anticoagulated  Fluid management and electrolytes as per renal  Will follow.

## 2020-10-31 NOTE — PROGRESS NOTES
prophylaxis, on Eliquis   · PT/OT    Plan of care discussed with Dr Patito Smiley, APRN - [phone] [facility] 10-31-20

## 2020-10-31 NOTE — PROGRESS NOTES
Nephrology Progress Note    Jalil Lao MD       Follow Up for (CC) : CKD3/4    Physician Addendum  I have seen and examined pt at bed side.    I reviewed and agree with CNP's note. I performed all key and critical portions of this evaluation. I agree with the plan of care as noted above. Receiving IV calcium replacement  Okay for discharge from nephrology perspective  Check BMP in 1 to 2 weeks     Electronically signed by Brad Tamayo MD on 10/31/20 3:19 PM            SUBJECTIVE      Creatinine increased to 1.94.  ica 0.74-calcium gluconate ordered      ASSESSMENT     CKD stage 4 with baseline GFR of 20s ml/min. Creatinine is at baseline. Hypertension. Anemia. Hypoparathyroidism. History of CHF and COPD. Hypomagnesemia  Nephrolithiasis s/p cysto, laser lithotripsy, right stent placement 10/30. Hypocalcemia    Active Problems:    COPD exacerbation (HCC)  Resolved Problems:    * No resolved hospital problems. *    PLAN   Continue Lasix IV  20mg BID. On Aranesp. Target hgb 10-11.5. Ca being replaced IV and po. Optimization of patient's of pulmonary treatment as per pulmonary service noted  Ok to increase eliquis to 5mg BID from renal standpoint. Outpatient renal follow-up needed and recommended. Please do not hesitate to call with questions. Pt seen in collaboration with Dr. Raven Ortiz.     Chief Complaint   Patient presents with    Shortness of Breath       Patient Active Problem List   Diagnosis    Controlled type 2 diabetes mellitus with stage 3 chronic kidney disease, without long-term current use of insulin (HCC)    Hyperlipidemia    Essential hypertension    Chronic leg pain    Paroxysmal atrial fibrillation (HCC)    Asthma    Gastroesophageal reflux disease without esophagitis    ECTOR on CPAP    Type 2 diabetes mellitus with complication, without long-term current use of insulin ([address]    Spinal stenosis in cervical region    Hypomagnesemia    Hyperphosphaturia Current Facility-Administered Medications   Medication Dose Route Frequency Provider Last Rate Last Dose    calcium gluconate 1 g in dextrose 5 % 100 mL IVPB  1 g Intravenous Once Everardo Cain MD        oyster shell calcium/vitamin D 250-125 MG-UNIT per tablet 500 mg  2 tablet Oral Daily Everardo Cain MD        darbepoetin albaro-polysorbate (ARANESP) injection 60 mcg  60 mcg Subcutaneous Once per day on Wed Raf Mckeon MD   60 mcg at 10/28/20 1725    predniSONE (DELTASONE) tablet 40 mg  40 mg Oral Daily Raf Mckeon MD   40 mg at 10/31/20 0845    furosemide (LASIX) injection 20 mg  20 mg Intravenous BID Raf Mckeon MD   20 mg at 10/31/20 8929    modafinil (PROVIGIL) tablet 100 mg  100 mg Oral Daily Raf Mckeon MD   100 mg at 10/31/20 9569    apixaban (ELIQUIS) tablet 2.5 mg  2.5 mg Oral BID Raf Mckeon MD   Stopped at 10/28/20 1118    melatonin tablet 3 mg  3 mg Oral Nightly PRN Raf Mckeon MD   3 mg at 10/30/20 2333    albuterol (PROVENTIL) nebulizer solution 2.5 mg  2.5 mg Nebulization Q4H PRN Raf Mckeon MD   2.5 mg at 10/23/20 0405    albuterol (PROVENTIL) nebulizer solution 2.5 mg  2.5 mg Nebulization 4x daily Raf Mckeon MD   2.5 mg at 10/31/20 1109    ALPRAZolam Verlene Donna) tablet 0.25 mg  0.25 mg Oral BID PRN Raf Mckeon MD   0.25 mg at 10/30/20 2333    amiodarone (CORDARONE) tablet 200 mg  200 mg Oral Daily Raf Mckeon MD   200 mg at 10/31/20 0845    atorvastatin (LIPITOR) tablet 20 mg  20 mg Oral Daily Raf Mckeon MD   20 mg at 10/31/20 0845    carbidopa-levodopa (SINEMET CR)  MG per extended release tablet 1 tablet  1 tablet Oral 4x Daily Raf Mckeon MD   1 tablet at 10/31/20 5112    budesonide-formoterol (SYMBICORT) 160-4.5 MCG/ACT inhaler 2 puff  2 puff Inhalation BID Raf Mckeon MD   2 puff at 10/31/20 0593    isosorbide mononitrate (IMDUR) extended release tablet 30 mg  30 mg Oral Daily Raf Mckeon MD   30 mg at 10/31/20 0843    metoprolol tartrate (LOPRESSOR) tablet 25 mg  25 mg Oral Daily José Luis Wasserman MD   25 mg at 10/31/20 0844    pantoprazole (PROTONIX) tablet 40 mg  40 mg Oral QAM AC José Luis Wasserman MD   40 mg at 10/31/20 6030    rasagiline (AZILECT) tablet 1 mg  1 mg Oral Daily José Luis Wasserman MD   1 mg at 10/31/20 7035    rOPINIRole (REQUIP) tablet 2 mg  2 mg Oral TID José Luis Wasserman MD   2 mg at 10/31/20 0910    tiotropium (SPIRIVA RESPIMAT) 2.5 MCG/ACT inhaler 2 puff  2 puff Inhalation Daily José Luis Wasserman MD   2 puff at 10/31/20 0606    sodium chloride flush 0.9 % injection 10 mL  10 mL Intravenous 2 times per day José Luis Wasserman MD   10 mL at 10/31/20 0911    sodium chloride flush 0.9 % injection 10 mL  10 mL Intravenous PRN José Luis Wasserman MD        acetaminophen (TYLENOL) tablet 650 mg  650 mg Oral Q6H PRN José Luis Wasserman MD   650 mg at 10/28/20 1731    Or    acetaminophen (TYLENOL) suppository 650 mg  650 mg Rectal Q6H PRN José Luis Wasserman MD        polyethylene glycol [facility]) packet 17 g  17 g Oral Daily PRN José Luis Wasserman MD        promethazine (PHENERGAN) tablet 12.5 mg  12.5 mg Oral Q6H PRN José Luis Wasserman MD        Or    [facility]ron [facility]) injection 4 mg  4 mg Intravenous Q6H PRN José Luis Wasserman MD   4 mg at 10/26/20 2214    glucose (GLUTOSE) 40 % oral gel 15 g  15 g Oral PRN José Luis Wasserman MD        dextrose 50 % IV solution  12.5 g Intravenous PRN José Luis Wasserman MD        glucagon (rDNA) injection 1 mg  1 mg Intramuscular PRN José Luis Wasserman MD        dextrose 5 % solution  100 mL/hr Intravenous PRN José Luis Wasserman MD        insulin lispro (HUMALOG) injection vial 0-6 Units  0-6 Units Subcutaneous TID WC José Luis Wasserman MD   1 Units at 10/31/20 0850    insulin lispro (HUMALOG) injection vial 0-3 Units  0-3 Units Subcutaneous Nightly José Luis Wasserman MD   2 Units at 10/30/20 2115          Allergies   Allergen Reactions    Dye [Barium-Containing Compounds] Other (See Comments) All other ROS is negative. OBJECTIVE      Vitals:    10/31/20 0738   BP: 138/65   Pulse: 90   Resp: 18   Temp: 98.6 °F (37 °C)   SpO2: 97%     Wt Readings from Last 3 Encounters:   10/31/20 180 lb 1.6 oz (81.7 kg)   09/23/20 170 lb (77.1 kg)   02/28/20 173 lb 1.6 oz (78.5 kg)     I/O last 3 completed shifts: In: 172 [P.O.:300; I.V.:73; IV Piggyback:150]  Out: 2900 [Urine:2900]  Body mass index is 38.97 kg/m². PHYSICAL EXAM        GENERAL APPEARANCE:Awake, alert, in no acute distress  SKIN: warm and dry, no rash or erythema  EYES: conjunctivae normal and sclera anicteric  NECK:  JVD Absent. PULMONARY: Symmetrical and decreased breath sounds BL. CADRDIOVASCULAR: S1 and S2 audible. ABDOMEN: soft nontender, bowel sounds present. EXTREMITIES: BL edema + R>L.     LABS      Data:    CBC:   Recent Labs     10/29/20  0530 10/30/20  0547 10/31/20  0548   WBC 12.7* 14.2* 16.1*   HGB 9.2* 9.2* 9.3*   HCT 31.5* 31.5* 31.2*   .1* 106.4* 107.2*    116* 112*     BMP:    Recent Labs     10/29/20  0530 10/30/20  0547 10/31/20  0548    141 137   K 4.4 4.0 4.5   CL 93* 94* 90*   CO2 37* 36* 39*   BUN 55* 55* 51*   CREATININE 1.75* 1.75* 1.94*   GLUCOSE 264* 145* 191*     CMP:   Recent Labs     10/29/20  0530 10/30/20  0547 10/31/20  0548    141 137   K 4.4 4.0 4.5   CL 93* 94* 90*   CO2 37* 36* 39*   BUN 55* 55* 51*   CREATININE 1.75* 1.75* 1.94*   GLUCOSE 264* 145* 191*   CALCIUM 6.6* 6.3* 5.9*                     URINALYSIS/URINE CHEMISTRIES      URINE SODIUM:    Lab Results   Component Value Date    EDWARD 46 03/04/2020      URINE OSMOLARITY:  No results found for: OSMOU  URINE CREATININE:    Lab Results   Component Value Date    LABCREA 79.1 03/04/2020     URINE EOSINOPHILS: No components found for: EOSU  URINALYSIS:  U/A:     RADIOLOGY      Results for orders placed during the hospital encounter of 02/28/20   US RENAL COMPLETE    Narrative EXAMINATION:  RETROPERITONEAL ULTRASOUND OF THE KIDNEYS AND URINARY BLADDER    3/3/2020    COMPARISON:  CT renal stone protocol from 02/12/2020    HISTORY:  ORDERING SYSTEM PROVIDED HISTORY: increased creatinine  TECHNOLOGIST PROVIDED HISTORY:  increased creatinine    60-year-old female with elevated creatinine    FINDINGS:    Kidneys:    Exam limited due to patient's inability to hold breath. Right kidney measures 8.7 x 4.3 x 4.4 cm. Right renal cortical thickness  measures 1.1 cm. Left kidney measures 10.2 x 4.5 x 4.3 cm. Left renal cortical thickness  measures 1.1 cm. Echogenic foci with acoustic shadowing are seen in the bilateral kidneys  likely representing bilateral renal calculi and/or renal vascular  calcifications with the largest in the medial right renal hilum measuring 1.3  cm. No hydronephrosis or perinephric fluid. Gross preservation of the corticomedullary differentiation. Gallstones and gallbladder sludge. Bladder:    Patient voided immediately prior to the exam.  Small postvoid residual.      Impression 1. Bilateral renal calculi and/or renal vascular calcifications. No  hydronephrosis. 2. Gallstones and gallbladder sludge. 3. Small postvoid residual.  4. Exam limited due to patient's inability to breath hold. [address] [facility], [address] [facility].   Ph: [phone]

## 2020-10-31 NOTE — PROGRESS NOTES
09/23/2020    ALKPHOS 57 09/23/2020    AST 11 09/23/2020    ALT <5 09/23/2020        Assessment:  Patient Active Problem List   Diagnosis    Controlled type 2 diabetes mellitus with stage 3 chronic kidney disease, without long-term current use of insulin (HCC)    Hyperlipidemia    Essential hypertension    Chronic leg pain    Paroxysmal atrial fibrillation (HCC)    Asthma    Gastroesophageal reflux disease without esophagitis    ECTOR on CPAP    Type 2 diabetes mellitus with complication, without long-term current use of insulin (HCC)    Spinal stenosis in cervical region    Hypomagnesemia    Hyperphosphaturia    Hypocalcemia    Parkinson's disease ([facility] [address]    Acute cystitis with hematuria    Altered mental status    Iron deficiency anemia due to chronic blood loss    Morbid obesity with BMI of 40.0-44.9, adult (HCC)    Hyperplastic polyp of intestine    Diverticulosis    Panlobular emphysema (HCC)    Rapid atrial fibrillation (HCC)    CKD (chronic kidney disease) stage 3, GFR 30-59 ml/min    Anemia in chronic kidney disease    Chronic respiratory failure with hypoxia and hypercapnia (HCC)    Acute kidney injury superimposed on chronic kidney disease ([facility] [address]    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 coli UTI (urinary tract infection)    Nephrolithiasis    Candidal intertrigo    Venous insufficiency    Malabsorption    [facility] of [facility], [facility] 4 ([facility]) [facility]    Iron deficiency    Coronary atherosclerosis    COPD exacerbation (HCC)    Restless leg syndrome    SIRS (systemic inflammatory response syndrome) (HCC)    Nausea and vomiting in adult    Bacterial UTI    Odynophagia    Esophageal dysphagia    Candida esophagitis (HCC)    Sepsis due to urinary tract infection (HCC)    Chronic respiratory failure with hypoxia (HCC)    Chronic diastolic congestive heart failure (HCC)    History of ESBL E. coli infection    Stage 4 chronic kidney disease ([facility] [address]    Fever    Macrocytic anemia    Hypercalcemia    Monoclonal gammopathy of undetermined significance    Acute nonintractable headache    Anemia of chronic renal failure, stage 4 (severe) (HCC)    Traumatic closed displaced fracture of proximal end of right humerus, initial encounter    Urinary tract infection without hematuria    Metabolic encephalopathy    Chest pain    Acute kidney injury ([facility] [address]    Right leg swelling    Hematuria    Chest pain, unspecified    Chronic obstructive pulmonary disease with (acute) exacerbation (HCC)     Acute on chronic respiratory failure with hypoxia hypercapnia  ECTOR on CPAP  CKD 4  COPD exacerbation  Paroxysmal atrial fibrillation  Chronic anticoagulation  Hematuria  Right renal calculi  Parkinson's  Morbid obesity excess calories  Macrocytic anemia  Type 2 diabetes with hyperglycemia and renal complications  \Hypocalcemia  Diastolic CHF  Plan:    Labs reviewed reviewed calcium gluconate IV piggyback and oral calcium started, patient had cystoscopy twice with laser treatment patient doing fairly well will discharge home if calcium is better currently asymptomatic, will need visiting home nurse home physical therapy occupational therapy up with a rohan Rasmussen MD  2:12 PM

## 2020-10-31 NOTE — PROGRESS NOTES
Patient discharged to home in good condition. All belongings and prescriptions in patient possession at time of d/c. D/c instructions given and patient denies any further questions. Pt. Escorted to vehicle.

## 2020-10-31 NOTE — PLAN OF CARE
Problem: Falls - Risk of:  Goal: Will remain free from falls  Description: Will remain free from falls  Outcome: Met This Shift  Goal: Absence of physical injury  Description: Absence of physical injury  Outcome: Met This Shift     Problem: Discharge Planning:  Goal: Discharged to appropriate level of care  Description: Discharged to appropriate level of care  Outcome: Met This Shift     Problem: Gas Exchange - Impaired:  Goal: Levels of oxygenation will improve  Description: Levels of oxygenation will improve  Outcome: Met This Shift     Problem: Skin Integrity:  Goal: Will show no infection signs and symptoms  Description: Will show no infection signs and symptoms  Outcome: Ongoing  Goal: Absence of new skin breakdown  Description: Absence of new skin breakdown  Outcome: Ongoing     Problem: Nutrition  Goal: Optimal nutrition therapy  Outcome: Ongoing     Problem:  Activity Intolerance:  Goal: Ability to tolerate increased activity will improve  Description: Ability to tolerate increased activity will improve  Outcome: Ongoing     Problem: Airway Clearance - Ineffective:  Goal: Ability to maintain a clear airway will improve  Description: Ability to maintain a clear airway will improve  Outcome: Ongoing     Problem: Breathing Pattern - Ineffective:  Goal: Ability to achieve and maintain a regular respiratory rate will improve  Description: Ability to achieve and maintain a regular respiratory rate will improve  Outcome: Ongoing     Problem: Serum Glucose Level - Abnormal:  Goal: Ability to maintain appropriate glucose levels will improve  Description: Ability to maintain appropriate glucose levels will improve  Outcome: Ongoing     Problem: Sensory Perception - Impaired:  Goal: Ability to maintain a stable neurologic state will improve  Description: Ability to maintain a stable neurologic state will improve  Outcome: Ongoing     Problem: Pain:  Goal: Pain level will decrease  Description: Pain level will decrease  Outcome: Ongoing  Goal: Control of acute pain  Description: Control of acute pain  Outcome: Ongoing  Goal: Control of chronic pain  Description: Control of chronic pain  Outcome: Ongoing

## 2020-10-31 NOTE — PROGRESS NOTES
PULMONARY PROGRESS NOTE:     Interval History: acute on chronic hypoxic/hypercarbic respiratory failure, acute exacerbation of COPD, chronic diastolic heart failure, ECTOR/obesity     Shortness of Breath: denies  Cough: no  Sputum: no  Hemoptysis: no  Chest Pain: no  Fever: yes  Swelling Feet: no  Headache: no  Nausea, Emesis, Abdominal Pain: no  Diarrhea: no  Constipation: no     Events since last visit: underwent lithotripsy and rt ureter stent yesterday.  Wore BIPAP a short time overnight - reports claustaphobia     PAST MEDICAL HISTORY:     Smoking:     PHYSICAL EXAMINATION:  tmax 99.9  General : Awake, alert, oriented to time, place, and person  Neck - supple, no lymphadenopathy, JVD not raised  Heart - regular rhythm, S1 and S2 normal; no additional sounds heard  Lungs - Air Entry- fair bilaterally; breath sounds : vesicular 97% on 3 l nc  Abdomen - soft, no tenderness  Upper Extremities  - no cyanosis, mottling; edema : absent  Lower Extremities: no cyanosis, mottling; edema : absent     Current Laboratory, Radiologic, Microbiologic, and Diagnostic studies reviewed     ASSESSMENT / PLAN:  Impression:  · Acute on chronic hypoxic/hypercarbic respiratory failure  · Acute exacerbation of COPD/former smoker, 30-pack-year history  · Chronic diastolic heart failure  · Obstructive sleep apnea/Obesity, noncompliant on CPAP  · KRISTIN on CKD  · Elevated troponin  · A. fib, anxiety, DM, HLD, HTN,  GERD  · Cluster of stones in right renal pelvis measuring 12 mm  · hypocalcemia  · Gross hematuria, s/p cystoscopy     Recommendations:  · BiPAP while asleep and as necessary during the day  · Oxygen via nasal cannula, keep SPO2 greater than 92%   · Incentive spirometry every hour while awake  · 7-day course of Zithromax completed  · Prednisone taper  · Albuterol Q 4 hours and prn  · Symbicort 160  · Spiriva Respimat  · Continue Modafinil  · Diuresis per nephrology  · S/p rt ureter stent placed and laser lithotripsy on

## 2020-11-01 NOTE — CARE COORDINATION
Late entry 11/1 at 0835    Patient discharged home last night and call to  and notified of the discharge.

## 2020-11-02 NOTE — CARE COORDINATION
[facility] Transitions Initial Follow Up Call- COVID risk follow up call       Call within 2 business days of discharge: Yes    Patient: Santino Maya Patient : 1945   MRN: [id number]  Reason for Admission: COPD, kidney stone (lithotripsy and stent)   Discharge Date: 10/31/20 RARS: Readmission Risk Score: 37      Last Discharge [facility]       Complaint Diagnosis Description Type Department Provider    10/22/20 Shortness of Breath COPD exacerbation [facility]) ED to Hosp-Admission (Discharged) (ADMITTED) RIC Olson MD; Anabela Delcid. .. Spoke with: Pt  Kory Vuong    Call to pt  (pt next to him, she verbalized consent to speak with him)   States pt doing \"fine\" but did have a reaction to prednisone- nauseated. They spoke with Dr Jason Banegas who is having her take one tablet a day now and will follow up with her on  at appt   States pt breathing has been good  States catheter is out and pt does not complain of pain, burning or blood in urine  Encouraged to call writer/ CTC or providers if questions or concerns- v/u       Challenges to be reviewed by the provider   Additional needs identified to be addressed with provider No  none    Discussed COVID-19 related testing which was available at this time. Test results were negative. Patient informed of results, if available? Yes         Method of communication with provider : none    Advance Care Planning:   Does patient have an Advance Directive:  not on file; education provided. Was this a readmission? No  Patient stated reason for admission: COPD   Patients top risk factors for readmission: lack of knowledge about disease and medical condition    Care Transition Nurse (CTN) contacted the family by telephone to perform post hospital discharge assessment. Verified name and  with family as identifiers. Provided introduction to self, and explanation of the CTN role.      CTN reviewed discharge instructions, medical action plan and red flags with family who verbalized understanding. Family given an opportunity to ask questions and does not have any further questions or concerns at this time. Were discharge instructions available to patient? Yes. Reviewed appropriate site of care based on symptoms and resources available to patient including: PCP, Specialist, Urgent care clinics, When to call 911 and ctn. The family agrees to contact the PCP office for questions related to their healthcare. Medication reconciliation was performed with family, who verbalizes understanding of administration of home medications. Advised obtaining a 90-day supply of all daily and as-needed medications. Covid Risk Education    Patient has following risk factors of: heart failure, COPD, asthma, diabetes, chronic kidney disease and emphysema . Education provided regarding infection prevention, and signs and symptoms of COVID-19 and when to seek medical attention with family who verbalized understanding. Discussed exposure protocols and quarantine From CDC: Are you at higher risk for severe illness?   and given an opportunity for questions and concerns. The family agrees to contact the COVID-19 hotline [phone] or PCP office for questions related to COVID-19. For more information on steps you can take to protect yourself, see CDC's How to Protect Yourself     Patient/family/caregiver given information for [employer] and agrees to enroll no  Patient's preferred e-mail: declines  Patient's preferred phone number: declines    Discussed follow-up appointments. If no appointment was previously scheduled, appointment scheduling offered: Yes. Is follow up appointment scheduled within 7 days of discharge? Yes  [facility] follow up appointment(s): 11/5- PCP and neuro, 11/16- Ahmed (pulm)     Plan for follow-up call in 7-10 days based on severity of symptoms and risk factors.   Plan for next call: symptom management-breathing, kidney stones, medication management-nebulizer use and routine   CTN provided contact information for future needs. Non-face-to-face services provided:  Obtained and reviewed discharge summary and/or continuity of care documents  Communication with home health agencies or other community services the patient is currently using-confirmed SOC was yesterday   Assessment and support for treatment adherence and medication management-confirmed new and changed med (states eliquis had been 2.5 mg bid previously    Care Transitions 24 Hour Call    Do you have any ongoing symptoms?:  No  Do you have a copy of your discharge instructions?:  Yes  Do you have all of your prescriptions and are they filled?:  Yes  Have you been contacted by a [employer]?:  No  Were you discharged with any Home Care or Post Acute Services:  Yes  Post Acute Services:  Home Health (Comment:  [facility])  Patient DME:  Fernanda Hartmann cane, Straight cane, Shower chair, Other  Other Patient DME:  elevated toilet seat   Patient Home Equipment:  Nebulizer  Do you have support at home?:  Partner/Spouse/SO  Do you feel like you have everything you need to keep you well at home?:  Yes  Are you an active caregiver in your home?:  No  Care Transitions Interventions         Follow Up  Future Appointments   Date Time Provider Yue Qiu   11/3/2020  7:30 PM ST[facility] SLEEP RM 3 ST[facility]SLE[facility] [place] [facility]   11/5/2020  2:30 PM STV STA CHAIR 09 STVZ STA MED [place]   12/3/2020  1:30 PM STV STA CHAIR 15 STVZ STA MED [place]   12/23/2020  2:30 PM STV STA CHAIR 14 STVZ STA MED [place]   1/14/2021  2:30 PM STV STA CHAIR 14 STVZ STA MED [facility]   1/21/2021  2:00 PM Marry Mondragon MD [facility] Charlene Drew RN

## 2020-11-03 PROBLEM — R07.9 CHEST PAIN: Status: RESOLVED | Noted: 2019-08-25 | Resolved: 2020-01-01

## 2020-11-05 NOTE — PROGRESS NOTES
Nisreen Mulligan arrives per wheelchair with family from MD visit  Order for vitamin B12 injection and aranesp   B12 given to lt upper arm   Band aid applied  Labs reviewed and hg is 8.9  Aranesp given to rt upper arm   Band aid applied  Discharged per wheelchair with family

## 2020-11-05 NOTE — TELEPHONE ENCOUNTER
KHOI ARRIVES VIA WHEELCHAIR FOR MD VISIT & TX  DR PEDRO IN TO SEE PATIENT  ORDERS RECEIVED  CONTINUE ARANESP Q3 WEEKS FOR HB BELOW 1RV 4-6 MONTHS W/CMP VITAMIN D &B12 LEVEL FE TIBC FERRITIN AT TIME OF VISIT  LABS CMP VITAMIN B12 & D FE TIBC FERRITIN 03/25/21  MD VISIT 03/25/21 @2:15PM  AVS PRINTED AND GIVEN TO PATIENT WITH INSTRUCTIONS   PATIENT DISCHARGED TO [address] [place] [address]

## 2020-11-05 NOTE — PATIENT INSTRUCTIONS
Continue Aranesp q 3 weeks for Hb below 10  CMP, Vitamin D level, Vitamin B12 level and Iron studies at RV  RV 4-6 months at time of treatment

## 2020-11-07 NOTE — PROGRESS NOTES
_           Chief Complaint   Patient presents with    Follow-Up from Hospital     DIAGNOSIS:       Previous labs with Macrocytic anemia. Previous Evidence of Iron deficiency. History of thrombocytopenia. Repeated labs today with normocytic anemia. Anemia of chronic renal insufficiency stage IV. Multiple co-morbidities as listed. CURRENT THERAPY:     IV Iron infusion May 2018. Started Aranesp July 2018 for Hb below 10. BRIEF CASE HISTORY:      Ms. Eduardo Lopez is a very pleasant 70 y.o. female with history of multiple co-morbidities as listed. She has increasing weakness and fatigue. she presented to the hospital with what she thought was vaginal bleeding and possible hematuria. She was evaluated by GYN and urology. Cystoscopy was done. She had urethral lesion. She was noted to have severe anemia. She denies active bleeding at the present time. No hematemesis or melena. No hematochezia. Patient denies chest pain or palpitation. No SOB. She uses a wheelchair. INTERIM HISTORY:   The patient is seen for follow up anemia. She received Iron infusion in May 2018. She is maintained on Aranesp every 3 weeks for hemoglobin below 10. . Tolerated well. No side effects. She C/O weakness and fatigue. Overall feels better. No active bleeding. She had Parkinson disease. She is on treatment. No CP. No headache or dizziness. No weight loss. No active bleeding.      PAST MEDICAL HISTORY: has a past medical history of Acute on chronic diastolic congestive heart failure ([address] Anesthesia complication, Asthma, Atrial fibrillation ([address] Cataracts, bilateral, Cellulitis, Cerebral artery occlusion with cerebral infarction [facility]), CHF (congestive heart failure) ([address] Chronic kidney disease, Chronic kidney disease, Closed fracture of proximal end of right humerus with routine healing, Constipation, Controlled type 2 diabetes mellitus with stage 3 chronic kidney disease, without long-term current use of insulin (HCC), COPD (chronic obstructive pulmonary disease) ([facility] Difficult intravenous access, Difficulty walking, Diverticulosis, DM2 (diabetes mellitus, type 2) ([facility] Full dentures, GERD (gastroesophageal reflux disease), H/O fall, Headache(784.0), [place] (hard of hearing), Hyperlipidemia, Hyperplastic polyp of intestine, Hypertension, Impaired ambulation, Kidney stone, Living in nursing home, Morbid obesity with BMI of 40.0-44.9, adult ([facility] Muscle weakness, Nephrolithiasis, Open wound, ECTOR on CPAP, Osteoarthritis, Parkinson disease ([facility] [address] [facility] Restless leg syndrome, Spinal stenosis in cervical region, Type II or unspecified type diabetes mellitus without mention of complication, not stated as uncontrolled, Urethral caruncle, Wears glasses, and Wears glasses. PAST SURGICAL HISTORY: has a past surgical history that includes Cervical disc surgery (2012); Appendectomy; Tonsillectomy and adenoidectomy (1953); hernia repair (1999); eye surgery (Bilateral, 2017); Cervical spine surgery (5/31/13); laminectomy (05/31/2013); Upper gastrointestinal endoscopy (12/28/2016); Colonoscopy (2009); Colonoscopy (12/29/2016); Colonoscopy (12/30/2016); Colonoscopy (04/25/2017); pr colsc flx w/removal lesion by hot bx forceps (N/A, 4/25/2017); Cystorrhaphy (04/04/2018); pr cystourethroscopy,biopsies (N/A, 4/4/2018); pr [facility] incl fluor gdnce dx w/cell washg spx (N/A, 6/5/2018); Upper gastrointestinal endoscopy (N/A, 8/29/2018); shoulder fracture surgery (Right, 5/28/2019); Hardware Removal (Right, 07/05/2019); Hardware Removal (Right, 7/5/2019); Cystoscopy (N/A, 10/28/2020); and Cystoscopy (N/A, 10/30/2020).      CURRENT MEDICATIONS:  has a current medication list which includes the following prescription(s): apixaban, polyethylene glycol, oyster shell calcium/vitamin d, quetiapine, metoprolol tartrate, calcium-vitamin d-vitamin k, b-12, tiotropium, isosorbide mononitrate, fluticasone-vilanterol, albuterol, miconazole, melatonin, calcitriol, furosemide, atorvastatin, carbidopa-levodopa, ropinirole, onetouch verio, pantoprazole, linagliptin, one touch ultra test, amiodarone, magnesium oxide, senna, ferrous sulfate, rasagiline mesylate, oxygen concentrator, and prednisone. ALLERGIES:  is allergic to dye [barium-containing compounds]; pcn [penicillins]; and bactrim [sulfamethoxazole-trimethoprim]. FAMILY HISTORY: Negative for any hematological or oncological conditions. SOCIAL HISTORY:  reports that she quit smoking about 50 years ago. Her smoking use included cigarettes. She has a 30.00 pack-year smoking history. She has never used smokeless tobacco. She reports previous alcohol use. She reports that she does not use drugs. REVIEW OF SYSTEMS:     · General: + weakness + fatigue. No unanticipated weight loss or decreased appetite. No fever or chills. · Eyes: No blurred vision, eye pain or double vision. · Ears: No hearing problems or drainage. No tinnitus. · Throat: No sore throat, problems with swallowing or dysphagia. · Respiratory: No cough, sputum or hemoptysis. No shortness of breath. No pleuritic chest pain. · Cardiovascular: No chest pain, orthopnea or PND. No lower extremity edema. No palpitation. · Gastrointestinal: No problems with swallowing. No abdominal pain or bloating. No nausea or vomiting. No diarrhea or constipation. No GI bleeding. · Genitourinary: No dysuria, hematuria, frequency or urgency. · Musculoskeletal: ++ limitation of movement. ++ gait disturbance, using wheelchair. · Dermatologic: No skin rashes or pruritus. No skin lesions or discolorations. · Psychiatric: No depression, anxiety, or stress or signs of schizophrenia. No change in mood or affect. · Hematologic: No history of bleeding tendency. No bruises or ecchymosis. No history of clotting problems.   · Infectious disease: No fever, chills or frequent infections. · Endocrine: No problems with opacity. No polydipsia or polyuria. No temperature intolerance. · Neurologic: No headaches or dizziness. No weakness or numbness of the extremities. No changes in balance, coordination,  memory, mentation, behavior. · Allergic/Immunologic: No nasal congestion or hives. No repeated infections. PHYSICAL EXAM: poor performance with wheelchair use. The patient is not in acute distress. Vital signs: Blood pressure (!) 103/45, pulse 76, temperature 98.3 °F (36.8 °C), temperature source Temporal, resp. rate 18, last menstrual period 04/03/2004, not currently breastfeeding. HEENT:  Eyes are normal. Ears, nose and throat are normal.  Neck: Supple. No lymph node enlargement. No thyroid enlargement. Trachea is centrally located. Chest:  Clear to auscultation. No wheezes or crepitations. Heart: Regular sinus rhythm. Abdomen: Soft, nontender. No hepatosplenomegaly. No masses. Extremities:  With no edema. Lymph Nodes:  No cervical, axillary or inguinal lymph node enlargement. Neurologic:  Conscious and oriented. No focal neurological deficits. Psychosocial: No depression, anxiety or stress. Skin: No rashes, bruises or ecchymoses.       Review of Diagnostic data:   Lab Results   Component Value Date    WBC 12.6 (H) 11/06/2020    HGB 9.0 (L) 11/06/2020    HCT 32.3 (L) 11/06/2020    .7 (H) 11/06/2020     (L) 11/06/2020    LYMPHOPCT 6 (L) 11/05/2020    RBC 2.84 (L) 11/06/2020    MCH 31.7 11/06/2020    MCHC 27.9 (L) 11/06/2020    RDW 15.9 (H) 11/06/2020     Lab Results   Component Value Date    IRON 59 10/24/2020    TIBC 254 10/24/2020    FERRITIN 132 10/24/2020       Chemistry        Component Value Date/Time     11/06/2020 1330    K 4.2 11/06/2020 1330    CL 95 (L) 11/06/2020 1330    CO2 35 (H) 11/06/2020 1330    BUN 34 (H) 11/06/2020 1330    CREATININE 1.94 (H) 11/06/2020 1330        Component Value Date/Time    CALCIUM 6.5 (L) 11/06/2020 1330    ALKPHOS 57 09/23/2020 0017    AST 11 09/23/2020 0017    ALT <5 (L) 09/23/2020 0017    BILITOT 0.18 (L) 09/23/2020 0017        Lab Results   Component Value Date    IRON 59 10/24/2020    TIBC 254 10/24/2020    FERRITIN 132 10/24/2020         IMPRESSION:   Previous labs with Macrocytic anemia. Previous Evidence of Iron deficiency. History of thrombocytopenia. Repeated labs today with normocytic anemia. Anemia of chronic renal insufficiency stage IV. Multiple co-morbidities as listed. Status post right shoulder fracture. Status post surgery. Recent diagnosis of Parkinson disease    PLAN:   I reviewed the labs as above and discussed with the patient. I explained to the patient the nature of this hematologic problem. I explained the significance of these abnormalities in layman language. She is s/p Iron replacement. Will continue PO Iron replacement. CBC from today showed hemoglobin below 10. Aranesp will be given today. We will continue to monitor. We will give Aranesp for hemoglobin below 10. We will continue vitamin B12 injections. It is anemia of chronic renal insufficiency stage IV. We will give Aranesp 200 mcg every 3 weeks for Hb below 10. We will check the vitamin B12 and folate and iron studies at the time of next blood draw. We will make further recommendations based on response to treatment. Patient's questions were answered to the best of her satisfaction and she verbalized full understanding and agreement.

## 2020-11-10 NOTE — CARE COORDINATION
[facility] Transitions Follow Up Call- COVID risk follow up call       11/10/2020    Patient: Kellen Corona  Patient : 1945   MRN: [id number]  Reason for Admission: COPD, kidney stone (lithotripsy and stent)    Discharge Date: 10/31/20 RARS: Readmission Risk Score: 37         Spoke with: pt and her  Thang Jacobs    Call to pt and her  who states is doing \"fine\". States they are waiting for results of sleep study with concerns for sleep apnea and possibly narcolepsy  States breathing is about her norm- SOB with activity but better with rest, nebulizer and O2  Denies fever/ chills      Needs to be reviewed by the provider   Additional needs identified to be addressed with provider No  none  Discussed COVID-19 related testing which was available at this time. Test results were negative. Patient informed of results, if available? N/a          Method of communication with provider : none    Care Transition Nurse (CTN) contacted the patient by telephone to follow up after admission on 10/22/20. Verified name and  with patient as identifiers. Addressed changes since last contact: SOB  Discharged needs reviewed: none  Follow up appointment completed? Yes    CTN reviewed discharge instructions, medical action plan and red flags with patient and discussed any barriers to care and/or understanding of plan of care after discharge. Discussed appropriate site of care based on symptoms and resources available to patient including: PCP, Specialist and When to call 911. The patient agrees to contact the PCP office for questions related to their healthcare. Patients top risk factors for readmission: lack of knowledge about disease and medical condition  Interventions to address risk factors: n/a       Plan for follow-up call in 7-10 days based on severity of symptoms and risk factors. Plan for next call: symptom management-breathing   CTN provided contact information for future needs.           Care Transitions Subsequent and Final Call    Subsequent and Final Calls  Do you have any ongoing symptoms?:  No  Have your medications changed?:  No  Do you have any questions related to your medications?:  No  Do you currently have any active services?:  Yes  Are you currently active with any services?:  Home Health  Do you have any needs or concerns that I can assist you with?:  No  Identified Barriers:  Lack of Education  Care Transitions Interventions  Other Interventions:             Follow Up  Future Appointments   Date Time Provider Yue Qiu   12/3/2020  1:30 PM STV STA CHAIR 15 STVZ STA MED [place]   12/23/2020  2:30 PM STV STA CHAIR 14 STVZ STA MED [place]   1/14/2021  2:30 PM STV STA CHAIR 14 STVZ STA MED [facility]   3/25/2021  2:15 PM Andree Pinzon MD [facility] [place] Lim RN

## 2020-11-17 NOTE — CARE COORDINATION
[facility] Transitions Follow Up Call- final COVID risk follow up call       2020    Patient: Anabela Garcia  Patient : 1945   MRN: [id number]  Reason for Admission: COPD, kidney stone (lithotripsy and stent)     Discharge Date: 10/31/20 RARS: Readmission Risk Score: 37         Spoke with: pt  [facility] (HIPAA approved)    Call to pt  who states pt is doing \"pretty good\"  States breathing has been better and still using nebulizer a few times a day still   States still no results of sleep study but he will call them after this call (has been 2 weeks today)     You Patient resolved from the Care Transitions episode on 20  Discussed COVID-19 related testing which was available at this time. Test results were negative. Patient informed of results, if available? n/a    Patient/family has been provided the following resources and education related to COVID-19:                         Signs, symptoms and red flags related to COVID-19            CDC exposure and quarantine guidelines            [facility] exposure contact - [phone]            Contact for their local Department of Health                 Patient currently reports that the following symptoms have improved:  fatigue, cough, shortness of breath and no new/worsening symptoms     No further outreach scheduled with this CTN/ACM. Episode of Care resolved. Patient has this CTN/ACM contact information if future needs arise. Care Transitions Subsequent and Final Call    Subsequent and Final Calls  Do you have any ongoing symptoms?:  No  Have your medications changed?:  No  Do you have any questions related to your medications?:  No  Do you currently have any active services?:  Yes  Are you currently active with any services?:  Home Health  Do you have any needs or concerns that I can assist you with?:  No  Identified Barriers:  Lack of Education  Care Transitions Interventions  Other Interventions:             Follow Up  Future Appointments   Date Time Provider Yue Qiu   12/3/2020  1:30 PM STV STA CHAIR 15 [facility] STA [facility] [place]   12/23/2020  2:30 PM STV STA CHAIR 14 [facility] STA MED [place]   1/14/2021  2:30 PM STV STA CHAIR 14 STVZ STA MED [facility]   3/25/2021  2:15 PM Jessica Rea MD [facility] Royce Reddy RN

## 2020-12-08 NOTE — PROGRESS NOTES
Chief Complaint   Patient presents with    Shoulder Pain     right shoulder pain - sharp shooting pain where she had surgery in 2019 - pain startede about 2 months ago     This patient is seen here today complaining of pain in the right shoulder. Patient had undergone CRP P of right humeral proximal humeral fracture and 2019. Patient is hard of hearing. Her  interpreted most of the time. She has been having continuous pain in the shoulder. She has been having therapy. This has not helped her. Patient has multiple significant comorbidities. According to her  patient has severe diabetes type 2.,  She has stage IV chronic kidney disease, she has congestive heart failure, she is on oxygen all the time and she is nonambulatory. Examination: Her shoulder motions are very limited and painful. X-rays: Further x-rays were obtained showing the that she probably had a four-part fracture of the shoulder which was treated with closed reduction percutaneous pin fixation with good union of the head and the neck but the tuberosity appeared to be malunited. Diagnosis: Malunion tuberosity right humeral head. Treatment: I discussed with the patient as well as the  that surgery is definitely contraindicated in her case. The only thing we could do was to give her corticosteroid injection. She cannot take any anti-inflammatory medication and she cannot take anything like Percocet or hydrocodone either. Under sterile condition I injected 40 mg Depo-Medrol and 5 cc of 1% plain lidocaine into the shoulder joint and subacromial space and she had good relief of symptoms. I will see her as needed.

## 2020-12-23 NOTE — PLAN OF CARE
Problem: SAFETY  Goal: Free from accidental physical injury  12/23/2020 1521 by Candida Monge RN  Outcome: Completed  12/23/2020 1521 by Candida Monge RN  Outcome: Met This Shift

## 2020-12-23 NOTE — PROGRESS NOTES
Pt arrived per wheelchair with daughter for Aranesp and B12 injection . Denies any complaints or concerns . Vials obtained   Labs reviewed . Aranesp injection tolerated well   Patient arrive ambulatory for B12 injection  Band aide applied to site . Pt discharged per wheelchair with  .

## 2021-01-01 ENCOUNTER — OFFICE VISIT (OUTPATIENT)
Dept: ONCOLOGY | Age: 76
End: 2021-01-01
Payer: MEDICARE

## 2021-01-01 ENCOUNTER — HOSPITAL ENCOUNTER (OUTPATIENT)
Facility: MEDICAL CENTER | Age: 76
End: 2021-01-01
Payer: COMMERCIAL

## 2021-01-01 ENCOUNTER — APPOINTMENT (OUTPATIENT)
Dept: GENERAL RADIOLOGY | Age: 76
End: 2021-01-01
Payer: MEDICARE

## 2021-01-01 ENCOUNTER — HOSPITAL ENCOUNTER (EMERGENCY)
Age: 76
Discharge: HOME | End: 2021-01-18
Attending: EMERGENCY MEDICINE
Payer: MEDICARE

## 2021-01-01 ENCOUNTER — APPOINTMENT (OUTPATIENT)
Dept: MRI IMAGING | Age: 76
DRG: 065 | End: 2021-01-01
Payer: MEDICARE

## 2021-01-01 ENCOUNTER — HOSPITAL ENCOUNTER (OUTPATIENT)
Dept: INFUSION THERAPY | Facility: MEDICAL CENTER | Age: 76
Discharge: HOME | End: 2021-04-15
Payer: COMMERCIAL

## 2021-01-01 ENCOUNTER — HOSPITAL ENCOUNTER (OUTPATIENT)
Age: 76
Discharge: HOME | End: 2021-02-27
Payer: MEDICARE

## 2021-01-01 ENCOUNTER — APPOINTMENT (OUTPATIENT)
Dept: CT IMAGING | Age: 76
DRG: 065 | End: 2021-01-01
Payer: MEDICARE

## 2021-01-01 ENCOUNTER — HOSPITAL ENCOUNTER (OUTPATIENT)
Dept: GENERAL RADIOLOGY | Age: 76
Discharge: HOME | End: 2021-02-27
Payer: MEDICARE

## 2021-01-01 ENCOUNTER — HOSPITAL ENCOUNTER (OUTPATIENT)
Facility: MEDICAL CENTER | Age: 76
Discharge: HOME | End: 2021-02-04
Payer: MEDICARE

## 2021-01-01 ENCOUNTER — HOSPITAL ENCOUNTER (OUTPATIENT)
Facility: MEDICAL CENTER | Age: 76
Discharge: HOME | End: 2021-03-18
Payer: COMMERCIAL

## 2021-01-01 ENCOUNTER — HOSPITAL ENCOUNTER (OUTPATIENT)
Facility: MEDICAL CENTER | Age: 76
Discharge: HOME | End: 2021-01-14
Payer: MEDICARE

## 2021-01-01 ENCOUNTER — TELEPHONE (OUTPATIENT)
Dept: ORTHOPEDIC SURGERY | Age: 76
End: 2021-01-01

## 2021-01-01 ENCOUNTER — HOSPITAL ENCOUNTER (OUTPATIENT)
Dept: INFUSION THERAPY | Facility: MEDICAL CENTER | Age: 76
Discharge: HOME | End: 2021-02-25
Payer: MEDICARE

## 2021-01-01 ENCOUNTER — HOSPITAL ENCOUNTER (OUTPATIENT)
Facility: MEDICAL CENTER | Age: 76
Discharge: HOME | End: 2021-02-25
Payer: MEDICARE

## 2021-01-01 ENCOUNTER — HOSPITAL ENCOUNTER (OUTPATIENT)
Dept: INFUSION THERAPY | Facility: MEDICAL CENTER | Age: 76
Discharge: HOME | End: 2021-02-04
Payer: MEDICARE

## 2021-01-01 ENCOUNTER — APPOINTMENT (OUTPATIENT)
Dept: GENERAL RADIOLOGY | Age: 76
DRG: 065 | End: 2021-01-01
Payer: MEDICARE

## 2021-01-01 ENCOUNTER — HOSPITAL ENCOUNTER (INPATIENT)
Age: 76
LOS: 3 days | Discharge: NURSING, REHAB OR LONG-TERM CARE | DRG: 065 | End: 2021-03-09
Attending: EMERGENCY MEDICINE | Admitting: INTERNAL MEDICINE
Payer: MEDICARE

## 2021-01-01 ENCOUNTER — TELEPHONE (OUTPATIENT)
Dept: ONCOLOGY | Age: 76
End: 2021-01-01

## 2021-01-01 ENCOUNTER — HOSPITAL ENCOUNTER (OUTPATIENT)
Dept: INFUSION THERAPY | Facility: MEDICAL CENTER | Age: 76
Discharge: HOME | End: 2021-01-14
Payer: MEDICARE

## 2021-01-01 ENCOUNTER — HOSPITAL ENCOUNTER (OUTPATIENT)
Dept: INFUSION THERAPY | Facility: MEDICAL CENTER | Age: 76
Discharge: HOME | End: 2021-03-18
Payer: COMMERCIAL

## 2021-01-01 VITALS
SYSTOLIC BLOOD PRESSURE: 105 MMHG | DIASTOLIC BLOOD PRESSURE: 55 MMHG | HEART RATE: 63 BPM | TEMPERATURE: 97.7 F | RESPIRATION RATE: 20 BRPM

## 2021-01-01 VITALS
WEIGHT: 174.6 LBS | DIASTOLIC BLOOD PRESSURE: 42 MMHG | RESPIRATION RATE: 20 BRPM | HEIGHT: 61 IN | HEART RATE: 71 BPM | OXYGEN SATURATION: 99 % | SYSTOLIC BLOOD PRESSURE: 126 MMHG | TEMPERATURE: 98.8 F | BODY MASS INDEX: 32.97 KG/M2

## 2021-01-01 VITALS
RESPIRATION RATE: 18 BRPM | SYSTOLIC BLOOD PRESSURE: 116 MMHG | TEMPERATURE: 98.2 F | DIASTOLIC BLOOD PRESSURE: 49 MMHG | HEART RATE: 72 BPM

## 2021-01-01 VITALS
TEMPERATURE: 98.3 F | HEART RATE: 66 BPM | DIASTOLIC BLOOD PRESSURE: 47 MMHG | RESPIRATION RATE: 20 BRPM | SYSTOLIC BLOOD PRESSURE: 114 MMHG

## 2021-01-01 VITALS
HEART RATE: 63 BPM | RESPIRATION RATE: 18 BRPM | BODY MASS INDEX: 32.66 KG/M2 | OXYGEN SATURATION: 97 % | HEIGHT: 61 IN | SYSTOLIC BLOOD PRESSURE: 130 MMHG | WEIGHT: 173 LBS | DIASTOLIC BLOOD PRESSURE: 61 MMHG | TEMPERATURE: 98.2 F

## 2021-01-01 VITALS
TEMPERATURE: 97.6 F | DIASTOLIC BLOOD PRESSURE: 56 MMHG | SYSTOLIC BLOOD PRESSURE: 133 MMHG | HEART RATE: 67 BPM | RESPIRATION RATE: 18 BRPM

## 2021-01-01 DIAGNOSIS — K90.89 OTHER SPECIFIED INTESTINAL MALABSORPTION: Primary | ICD-10-CM

## 2021-01-01 DIAGNOSIS — M62.838 SPASM OF MUSCLE: ICD-10-CM

## 2021-01-01 DIAGNOSIS — E61.1 IRON DEFICIENCY: ICD-10-CM

## 2021-01-01 DIAGNOSIS — N18.4 ANEMIA OF CHRONIC RENAL FAILURE, STAGE 4 (SEVERE) (HCC): ICD-10-CM

## 2021-01-01 DIAGNOSIS — R77.8 ELEVATED TROPONIN: ICD-10-CM

## 2021-01-01 DIAGNOSIS — D63.1 ANEMIA OF CHRONIC RENAL FAILURE, STAGE 4 (SEVERE) (HCC): ICD-10-CM

## 2021-01-01 DIAGNOSIS — G89.29 CHRONIC RIGHT SHOULDER PAIN: Primary | ICD-10-CM

## 2021-01-01 DIAGNOSIS — N18.9 ANEMIA IN CHRONIC KIDNEY DISEASE, UNSPECIFIED CKD STAGE: Primary | ICD-10-CM

## 2021-01-01 DIAGNOSIS — R41.82 ALTERED MENTAL STATUS, UNSPECIFIED ALTERED MENTAL STATUS TYPE: Primary | ICD-10-CM

## 2021-01-01 DIAGNOSIS — D63.1 ANEMIA IN CHRONIC KIDNEY DISEASE, UNSPECIFIED CKD STAGE: Primary | ICD-10-CM

## 2021-01-01 DIAGNOSIS — M48.02 SPINAL STENOSIS IN CERVICAL REGION: ICD-10-CM

## 2021-01-01 DIAGNOSIS — M25.511 CHRONIC RIGHT SHOULDER PAIN: Primary | ICD-10-CM

## 2021-01-01 DIAGNOSIS — R13.10 PROBLEMS WITH SWALLOWING AND MASTICATION: ICD-10-CM

## 2021-01-01 DIAGNOSIS — M54.12 BRACHIAL NEURITIS: ICD-10-CM

## 2021-01-01 LAB
-: ABNORMAL
-: ABNORMAL
ABSOLUTE EOS #: 0.06 K/UL (ref 0–0.4)
ABSOLUTE EOS #: 0.08 K/UL (ref 0–0.44)
ABSOLUTE EOS #: 0.1 K/UL (ref 0–0.4)
ABSOLUTE EOS #: 0.1 K/UL (ref 0–0.4)
ABSOLUTE EOS #: 0.11 K/UL (ref 0–0.4)
ABSOLUTE EOS #: 0.12 K/UL (ref 0–0.44)
ABSOLUTE EOS #: 0.12 K/UL (ref 0–0.44)
ABSOLUTE EOS #: 0.13 K/UL (ref 0–0.4)
ABSOLUTE EOS #: 0.14 K/UL (ref 0–0.4)
ABSOLUTE EOS #: 0.15 K/UL (ref 0–0.44)
ABSOLUTE IMMATURE GRANULOCYTE: 0.05 K/UL (ref 0–0.3)
ABSOLUTE IMMATURE GRANULOCYTE: 0.05 K/UL (ref 0–0.3)
ABSOLUTE IMMATURE GRANULOCYTE: 0.06 K/UL (ref 0–0.3)
ABSOLUTE IMMATURE GRANULOCYTE: 0.07 K/UL (ref 0–0.3)
ABSOLUTE IMMATURE GRANULOCYTE: 0.07 K/UL (ref 0–0.3)
ABSOLUTE IMMATURE GRANULOCYTE: 0.08 K/UL (ref 0–0.3)
ABSOLUTE LYMPH #: 0.91 K/UL (ref 1.1–3.7)
ABSOLUTE LYMPH #: 0.95 K/UL (ref 1–4.8)
ABSOLUTE LYMPH #: 0.97 K/UL (ref 1–4.8)
ABSOLUTE LYMPH #: 0.99 K/UL (ref 1.1–3.7)
ABSOLUTE LYMPH #: 0.99 K/UL (ref 1–4.8)
ABSOLUTE LYMPH #: 1.05 K/UL (ref 1–4.8)
ABSOLUTE LYMPH #: 1.08 K/UL (ref 1–4.8)
ABSOLUTE LYMPH #: 1.15 K/UL (ref 1–4.8)
ABSOLUTE LYMPH #: 1.22 K/UL (ref 1.1–3.7)
ABSOLUTE LYMPH #: 1.23 K/UL (ref 1.1–3.7)
ABSOLUTE MONO #: 0.35 K/UL (ref 0.2–0.8)
ABSOLUTE MONO #: 0.47 K/UL (ref 0.2–0.8)
ABSOLUTE MONO #: 0.5 K/UL (ref 0.1–1.2)
ABSOLUTE MONO #: 0.51 K/UL (ref 0.2–0.8)
ABSOLUTE MONO #: 0.52 K/UL (ref 0.1–1.2)
ABSOLUTE MONO #: 0.55 K/UL (ref 0.2–0.8)
ABSOLUTE MONO #: 0.58 K/UL (ref 0.2–0.8)
ABSOLUTE MONO #: 0.59 K/UL (ref 0.1–1.2)
ABSOLUTE MONO #: 0.62 K/UL (ref 0.2–0.8)
ABSOLUTE MONO #: 0.69 K/UL (ref 0.1–1.2)
ALBUMIN SERPL-MCNC: 3.5 G/DL (ref 3.5–5.2)
ALBUMIN SERPL-MCNC: 4 G/DL (ref 3.5–5.2)
ALBUMIN/GLOBULIN RATIO: ABNORMAL (ref 1–2.5)
ALBUMIN/GLOBULIN RATIO: ABNORMAL (ref 1–2.5)
ALP BLD-CCNC: 62 U/L (ref 35–104)
ALP BLD-CCNC: 69 U/L (ref 35–104)
ALT SERPL-CCNC: <5 U/L (ref 5–33)
ALT SERPL-CCNC: <5 U/L (ref 5–33)
AMORPHOUS: ABNORMAL
AMORPHOUS: ABNORMAL
ANION GAP SERPL CALCULATED.3IONS-SCNC: 5 MMOL/L (ref 9–17)
ANION GAP SERPL CALCULATED.3IONS-SCNC: 6 MMOL/L (ref 9–17)
ANION GAP SERPL CALCULATED.3IONS-SCNC: 7 MMOL/L (ref 9–17)
ANION GAP SERPL CALCULATED.3IONS-SCNC: 8 MMOL/L (ref 9–17)
ANION GAP SERPL CALCULATED.3IONS-SCNC: 8 MMOL/L (ref 9–17)
ANION GAP SERPL CALCULATED.3IONS-SCNC: 9 MMOL/L (ref 9–17)
AST SERPL-CCNC: 10 U/L
AST SERPL-CCNC: 15 U/L
BACTERIA: ABNORMAL
BACTERIA: ABNORMAL
BASOPHILS # BLD: 0 %
BASOPHILS # BLD: 0 % (ref 0–2)
BASOPHILS # BLD: 1 %
BASOPHILS # BLD: 1 %
BASOPHILS # BLD: 1 % (ref 0–2)
BASOPHILS ABSOLUTE: 0 K/UL (ref 0–0.2)
BASOPHILS ABSOLUTE: 0.06 K/UL (ref 0–0.2)
BASOPHILS ABSOLUTE: <0.03 K/UL (ref 0–0.2)
BILIRUB SERPL-MCNC: 0.12 MG/DL (ref 0.3–1.2)
BILIRUB SERPL-MCNC: 0.13 MG/DL (ref 0.3–1.2)
BILIRUBIN URINE: NEGATIVE
BILIRUBIN URINE: NEGATIVE
BUN BLDV-MCNC: 31 MG/DL (ref 8–23)
BUN BLDV-MCNC: 37 MG/DL (ref 8–23)
BUN BLDV-MCNC: 39 MG/DL (ref 8–23)
BUN BLDV-MCNC: 39 MG/DL (ref 8–23)
BUN BLDV-MCNC: 40 MG/DL (ref 8–23)
BUN BLDV-MCNC: 43 MG/DL (ref 8–23)
BUN/CREAT BLD: 16 (ref 9–20)
BUN/CREAT BLD: 20 (ref 9–20)
BUN/CREAT BLD: 21 (ref 9–20)
BUN/CREAT BLD: 22 (ref 9–20)
C-REACTIVE PROTEIN: <3 MG/L (ref 0–5)
CALCIUM SERPL-MCNC: 10.3 MG/DL (ref 8.6–10.4)
CALCIUM SERPL-MCNC: 8.7 MG/DL (ref 8.6–10.4)
CALCIUM SERPL-MCNC: 8.9 MG/DL (ref 8.6–10.4)
CALCIUM SERPL-MCNC: 9.2 MG/DL (ref 8.6–10.4)
CALCIUM SERPL-MCNC: 9.3 MG/DL (ref 8.6–10.4)
CALCIUM SERPL-MCNC: 9.3 MG/DL (ref 8.6–10.4)
CASTS UA: ABNORMAL /LPF
CHLORIDE BLD-SCNC: 100 MMOL/L (ref 98–107)
CHLORIDE BLD-SCNC: 95 MMOL/L (ref 98–107)
CHLORIDE BLD-SCNC: 96 MMOL/L (ref 98–107)
CHLORIDE BLD-SCNC: 97 MMOL/L (ref 98–107)
CHLORIDE BLD-SCNC: 98 MMOL/L (ref 98–107)
CHLORIDE BLD-SCNC: 99 MMOL/L (ref 98–107)
CHOLESTEROL/HDL RATIO: 5.9
CHOLESTEROL: 183 MG/DL
CO2: 33 MMOL/L (ref 20–31)
CO2: 37 MMOL/L (ref 20–31)
CO2: 38 MMOL/L (ref 20–31)
CO2: 39 MMOL/L (ref 20–31)
CO2: 41 MMOL/L (ref 20–31)
CO2: 42 MMOL/L (ref 20–31)
COLOR: YELLOW
COLOR: YELLOW
COMMENT UA: ABNORMAL
COMMENT UA: ABNORMAL
CREAT SERPL-MCNC: 1.79 MG/DL (ref 0.5–0.9)
CREAT SERPL-MCNC: 1.84 MG/DL (ref 0.5–0.9)
CREAT SERPL-MCNC: 1.84 MG/DL (ref 0.5–0.9)
CREAT SERPL-MCNC: 1.96 MG/DL (ref 0.5–0.9)
CREAT SERPL-MCNC: 1.98 MG/DL (ref 0.5–0.9)
CREAT SERPL-MCNC: 2.03 MG/DL (ref 0.5–0.9)
CRYSTALS, UA: ABNORMAL /HPF
CRYSTALS, UA: ABNORMAL /HPF
CULTURE: ABNORMAL
CULTURE: NO GROWTH
CULTURE: NORMAL
CULTURE: NORMAL
DIFFERENTIAL TYPE: ABNORMAL
EKG ATRIAL RATE: 54 BPM
EKG ATRIAL RATE: 67 BPM
EKG ATRIAL RATE: 67 BPM
EKG P AXIS: 105 DEGREES
EKG P AXIS: 74 DEGREES
EKG P AXIS: 89 DEGREES
EKG P-R INTERVAL: 210 MS
EKG P-R INTERVAL: 212 MS
EKG P-R INTERVAL: 218 MS
EKG Q-T INTERVAL: 420 MS
EKG Q-T INTERVAL: 424 MS
EKG Q-T INTERVAL: 468 MS
EKG QRS DURATION: 88 MS
EKG QRS DURATION: 88 MS
EKG QRS DURATION: 96 MS
EKG QTC CALCULATION (BAZETT): 443 MS
EKG QTC CALCULATION (BAZETT): 443 MS
EKG QTC CALCULATION (BAZETT): 448 MS
EKG R AXIS: -18 DEGREES
EKG R AXIS: -21 DEGREES
EKG R AXIS: -23 DEGREES
EKG T AXIS: 20 DEGREES
EKG T AXIS: 20 DEGREES
EKG T AXIS: 42 DEGREES
EKG VENTRICULAR RATE: 54 BPM
EKG VENTRICULAR RATE: 67 BPM
EKG VENTRICULAR RATE: 67 BPM
EOSINOPHILS RELATIVE PERCENT: 1 % (ref 1–4)
EOSINOPHILS RELATIVE PERCENT: 1 % (ref 1–4)
EOSINOPHILS RELATIVE PERCENT: 2 % (ref 1–4)
EPITHELIAL CELLS UA: ABNORMAL /HPF (ref 0–5)
EPITHELIAL CELLS UA: ABNORMAL /HPF (ref 0–5)
ESTIMATED AVERAGE GLUCOSE: 103 MG/DL
ESTIMATED AVERAGE GLUCOSE: 105 MG/DL
GFR AFRICAN AMERICAN: 29 ML/MIN
GFR AFRICAN AMERICAN: 30 ML/MIN
GFR AFRICAN AMERICAN: 30 ML/MIN
GFR AFRICAN AMERICAN: 33 ML/MIN
GFR AFRICAN AMERICAN: 33 ML/MIN
GFR AFRICAN AMERICAN: 34 ML/MIN
GFR NON-AFRICAN AMERICAN: 24 ML/MIN
GFR NON-AFRICAN AMERICAN: 25 ML/MIN
GFR NON-AFRICAN AMERICAN: 25 ML/MIN
GFR NON-AFRICAN AMERICAN: 27 ML/MIN
GFR NON-AFRICAN AMERICAN: 27 ML/MIN
GFR NON-AFRICAN AMERICAN: 28 ML/MIN
GFR SERPL CREATININE-BSD FRML MDRD: ABNORMAL ML/MIN/{1.73_M2}
GLUCOSE BLD-MCNC: 103 MG/DL (ref 70–99)
GLUCOSE BLD-MCNC: 105 MG/DL (ref 65–105)
GLUCOSE BLD-MCNC: 106 MG/DL (ref 65–105)
GLUCOSE BLD-MCNC: 107 MG/DL (ref 70–99)
GLUCOSE BLD-MCNC: 107 MG/DL (ref 70–99)
GLUCOSE BLD-MCNC: 109 MG/DL (ref 65–105)
GLUCOSE BLD-MCNC: 128 MG/DL (ref 65–105)
GLUCOSE BLD-MCNC: 129 MG/DL (ref 65–105)
GLUCOSE BLD-MCNC: 146 MG/DL (ref 65–105)
GLUCOSE BLD-MCNC: 179 MG/DL (ref 65–105)
GLUCOSE BLD-MCNC: 77 MG/DL (ref 65–105)
GLUCOSE BLD-MCNC: 82 MG/DL (ref 65–105)
GLUCOSE BLD-MCNC: 86 MG/DL (ref 65–105)
GLUCOSE BLD-MCNC: 86 MG/DL (ref 65–105)
GLUCOSE BLD-MCNC: 87 MG/DL (ref 65–105)
GLUCOSE BLD-MCNC: 88 MG/DL (ref 65–105)
GLUCOSE BLD-MCNC: 90 MG/DL (ref 70–99)
GLUCOSE BLD-MCNC: 91 MG/DL (ref 65–105)
GLUCOSE BLD-MCNC: 92 MG/DL (ref 70–99)
GLUCOSE BLD-MCNC: 92 MG/DL (ref 70–99)
GLUCOSE BLD-MCNC: 94 MG/DL (ref 65–105)
GLUCOSE BLD-MCNC: 98 MG/DL (ref 65–105)
GLUCOSE BLD-MCNC: 99 MG/DL (ref 65–105)
GLUCOSE URINE: NEGATIVE
GLUCOSE URINE: NEGATIVE
HBA1C MFR BLD: 5.2 % (ref 4–6)
HBA1C MFR BLD: 5.3 % (ref 4–6)
HCT VFR BLD CALC: 27.6 % (ref 36.3–47.1)
HCT VFR BLD CALC: 27.9 % (ref 36.3–47.1)
HCT VFR BLD CALC: 27.9 % (ref 36.3–47.1)
HCT VFR BLD CALC: 28.7 % (ref 36.3–47.1)
HCT VFR BLD CALC: 28.9 % (ref 36.3–47.1)
HCT VFR BLD CALC: 29.8 % (ref 36.3–47.1)
HCT VFR BLD CALC: 30.3 % (ref 36.3–47.1)
HCT VFR BLD CALC: 31.5 % (ref 36.3–47.1)
HCT VFR BLD CALC: 32.1 % (ref 36.3–47.1)
HCT VFR BLD CALC: 32.2 % (ref 36.3–47.1)
HDLC SERPL-MCNC: 31 MG/DL
HEMOGLOBIN: 8 G/DL (ref 11.9–15.1)
HEMOGLOBIN: 8.2 G/DL (ref 11.9–15.1)
HEMOGLOBIN: 8.3 G/DL (ref 11.9–15.1)
HEMOGLOBIN: 8.6 G/DL (ref 11.9–15.1)
HEMOGLOBIN: 8.8 G/DL (ref 11.9–15.1)
HEMOGLOBIN: 9.1 G/DL (ref 11.9–15.1)
HEMOGLOBIN: 9.3 G/DL (ref 11.9–15.1)
HEMOGLOBIN: 9.6 G/DL (ref 11.9–15.1)
IMMATURE GRANULOCYTES: 1 %
INR BLD: 1
KETONES, URINE: NEGATIVE
KETONES, URINE: NEGATIVE
LACTIC ACID, SEPSIS WHOLE BLOOD: ABNORMAL MMOL/L (ref 0.5–1.9)
LACTIC ACID, SEPSIS: 0.4 MMOL/L (ref 0.5–1.9)
LDL CHOLESTEROL: 107 MG/DL (ref 0–130)
LEUKOCYTE ESTERASE, URINE: ABNORMAL
LEUKOCYTE ESTERASE, URINE: NEGATIVE
LV EF: 55 %
LVEF MODALITY: NORMAL
LYMPHOCYTES # BLD: 14 % (ref 24–43)
LYMPHOCYTES # BLD: 14 % (ref 24–44)
LYMPHOCYTES # BLD: 16 % (ref 24–43)
LYMPHOCYTES # BLD: 16 % (ref 24–43)
LYMPHOCYTES # BLD: 17 % (ref 24–44)
LYMPHOCYTES # BLD: 18 % (ref 24–44)
LYMPHOCYTES # BLD: 19 % (ref 24–44)
LYMPHOCYTES # BLD: 21 % (ref 24–43)
Lab: ABNORMAL
Lab: NORMAL
MCH RBC QN AUTO: 32.1 PG (ref 25.2–33.5)
MCH RBC QN AUTO: 32.5 PG (ref 25.2–33.5)
MCH RBC QN AUTO: 32.9 PG (ref 25.2–33.5)
MCH RBC QN AUTO: 33.2 PG (ref 25.2–33.5)
MCH RBC QN AUTO: 33.6 PG (ref 25.2–33.5)
MCH RBC QN AUTO: 33.6 PG (ref 25.2–33.5)
MCH RBC QN AUTO: 33.8 PG (ref 25.2–33.5)
MCH RBC QN AUTO: 33.9 PG (ref 25.2–33.5)
MCH RBC QN AUTO: 34.2 PG (ref 25.2–33.5)
MCH RBC QN AUTO: 34.4 PG (ref 25.2–33.5)
MCHC RBC AUTO-ENTMCNC: 28.4 G/DL (ref 28.4–34.8)
MCHC RBC AUTO-ENTMCNC: 28.7 G/DL (ref 28.4–34.8)
MCHC RBC AUTO-ENTMCNC: 28.7 G/DL (ref 28.4–34.8)
MCHC RBC AUTO-ENTMCNC: 28.9 G/DL (ref 28.4–34.8)
MCHC RBC AUTO-ENTMCNC: 29 G/DL (ref 28.4–34.8)
MCHC RBC AUTO-ENTMCNC: 29.8 G/DL (ref 28.4–34.8)
MCV RBC AUTO: 111.2 FL (ref 82.6–102.9)
MCV RBC AUTO: 112.2 FL (ref 82.6–102.9)
MCV RBC AUTO: 113.7 FL (ref 82.6–102.9)
MCV RBC AUTO: 115.4 FL (ref 82.6–102.9)
MCV RBC AUTO: 116 FL (ref 82.6–102.9)
MCV RBC AUTO: 117 FL (ref 82.6–102.9)
MCV RBC AUTO: 117.1 FL (ref 82.6–102.9)
MCV RBC AUTO: 117.2 FL (ref 82.6–102.9)
MCV RBC AUTO: 117.7 FL (ref 82.6–102.9)
MCV RBC AUTO: 117.9 FL (ref 82.6–102.9)
MONOCYTES # BLD: 10 % (ref 1–7)
MONOCYTES # BLD: 7 % (ref 1–7)
MONOCYTES # BLD: 8 % (ref 1–7)
MONOCYTES # BLD: 8 % (ref 3–12)
MONOCYTES # BLD: 9 % (ref 1–7)
MONOCYTES # BLD: 9 % (ref 3–12)
MORPHOLOGY: ABNORMAL
MUCUS: ABNORMAL
MUCUS: ABNORMAL
MYOGLOBIN: 100 NG/ML (ref 25–58)
NITRITE, URINE: NEGATIVE
NITRITE, URINE: POSITIVE
NRBC AUTOMATED: 0 PER 100 WBC
NRBC AUTOMATED: ABNORMAL PER 100 WBC
OTHER OBSERVATIONS UA: ABNORMAL
OTHER OBSERVATIONS UA: ABNORMAL
PARTIAL THROMBOPLASTIN TIME: 28.6 SEC (ref 23.9–33.8)
PDW BLD-RTO: 14.1 % (ref 11.8–14.4)
PDW BLD-RTO: 14.6 % (ref 11.8–14.4)
PDW BLD-RTO: 14.6 % (ref 11.8–14.4)
PDW BLD-RTO: 14.7 % (ref 11.8–14.4)
PDW BLD-RTO: 14.8 % (ref 11.8–14.4)
PDW BLD-RTO: 14.9 % (ref 11.8–14.4)
PDW BLD-RTO: 14.9 % (ref 11.8–14.4)
PDW BLD-RTO: 15 % (ref 11.8–14.4)
PDW BLD-RTO: 15.1 % (ref 11.8–14.4)
PDW BLD-RTO: 15.3 % (ref 11.8–14.4)
PH UA: 5.5 (ref 5–8)
PH UA: 6 (ref 5–8)
PLATELET # BLD: 101 K/UL (ref 138–453)
PLATELET # BLD: 104 K/UL (ref 138–453)
PLATELET # BLD: 105 K/UL (ref 138–453)
PLATELET # BLD: 105 K/UL (ref 138–453)
PLATELET # BLD: 113 K/UL (ref 138–453)
PLATELET # BLD: 123 K/UL (ref 138–453)
PLATELET # BLD: 125 K/UL (ref 138–453)
PLATELET # BLD: 74 K/UL (ref 138–453)
PLATELET # BLD: 83 K/UL (ref 138–453)
PLATELET # BLD: 97 K/UL (ref 138–453)
PLATELET ESTIMATE: ABNORMAL
PMV BLD AUTO: 10 FL (ref 8.1–13.5)
PMV BLD AUTO: 10 FL (ref 8.1–13.5)
PMV BLD AUTO: 10.3 FL (ref 8.1–13.5)
PMV BLD AUTO: 10.4 FL (ref 8.1–13.5)
PMV BLD AUTO: 10.4 FL (ref 8.1–13.5)
PMV BLD AUTO: 10.5 FL (ref 8.1–13.5)
PMV BLD AUTO: 10.6 FL (ref 8.1–13.5)
PMV BLD AUTO: 11 FL (ref 8.1–13.5)
POTASSIUM SERPL-SCNC: 4.3 MMOL/L (ref 3.7–5.3)
POTASSIUM SERPL-SCNC: 4.4 MMOL/L (ref 3.7–5.3)
POTASSIUM SERPL-SCNC: 4.5 MMOL/L (ref 3.7–5.3)
POTASSIUM SERPL-SCNC: 4.6 MMOL/L (ref 3.7–5.3)
POTASSIUM SERPL-SCNC: 4.6 MMOL/L (ref 3.7–5.3)
POTASSIUM SERPL-SCNC: 5.1 MMOL/L (ref 3.7–5.3)
PROTEIN UA: NEGATIVE
PROTEIN UA: NEGATIVE
PROTHROMBIN TIME: 13.4 SEC (ref 11.5–14.2)
RBC # BLD: 2.37 M/UL (ref 3.95–5.11)
RBC # BLD: 2.38 M/UL (ref 3.95–5.11)
RBC # BLD: 2.38 M/UL (ref 3.95–5.11)
RBC # BLD: 2.45 M/UL (ref 3.95–5.11)
RBC # BLD: 2.47 M/UL (ref 3.95–5.11)
RBC # BLD: 2.57 M/UL (ref 3.95–5.11)
RBC # BLD: 2.68 M/UL (ref 3.95–5.11)
RBC # BLD: 2.77 M/UL (ref 3.95–5.11)
RBC # BLD: 2.79 M/UL (ref 3.95–5.11)
RBC # BLD: 2.86 M/UL (ref 3.95–5.11)
RBC # BLD: ABNORMAL 10*6/UL
RBC UA: ABNORMAL /HPF (ref 0–2)
RBC UA: ABNORMAL /HPF (ref 0–2)
RENAL EPITHELIAL, UA: ABNORMAL /HPF
RENAL EPITHELIAL, UA: ABNORMAL /HPF
SARS-COV-2, RAPID: NOT DETECTED
SARS-COV-2, RAPID: NOT DETECTED
SEDIMENTATION RATE, ERYTHROCYTE: 4 MM (ref 0–30)
SEG NEUTROPHILS: 66 % (ref 36–65)
SEG NEUTROPHILS: 69 % (ref 36–66)
SEG NEUTROPHILS: 70 % (ref 36–66)
SEG NEUTROPHILS: 71 % (ref 36–66)
SEG NEUTROPHILS: 72 % (ref 36–65)
SEG NEUTROPHILS: 73 % (ref 36–65)
SEG NEUTROPHILS: 74 % (ref 36–65)
SEG NEUTROPHILS: 75 % (ref 36–66)
SEGMENTED NEUTROPHILS ABSOLUTE COUNT: 3.55 K/UL (ref 1.8–7.7)
SEGMENTED NEUTROPHILS ABSOLUTE COUNT: 3.59 K/UL (ref 1.8–7.7)
SEGMENTED NEUTROPHILS ABSOLUTE COUNT: 3.82 K/UL (ref 1.5–8.1)
SEGMENTED NEUTROPHILS ABSOLUTE COUNT: 3.94 K/UL (ref 1.8–7.7)
SEGMENTED NEUTROPHILS ABSOLUTE COUNT: 4.35 K/UL (ref 1.8–7.7)
SEGMENTED NEUTROPHILS ABSOLUTE COUNT: 4.42 K/UL (ref 1.8–7.7)
SEGMENTED NEUTROPHILS ABSOLUTE COUNT: 4.53 K/UL (ref 1.5–8.1)
SEGMENTED NEUTROPHILS ABSOLUTE COUNT: 4.73 K/UL (ref 1.5–8.1)
SEGMENTED NEUTROPHILS ABSOLUTE COUNT: 5.17 K/UL (ref 1.8–7.7)
SEGMENTED NEUTROPHILS ABSOLUTE COUNT: 5.55 K/UL (ref 1.5–8.1)
SODIUM BLD-SCNC: 139 MMOL/L (ref 135–144)
SODIUM BLD-SCNC: 143 MMOL/L (ref 135–144)
SODIUM BLD-SCNC: 143 MMOL/L (ref 135–144)
SODIUM BLD-SCNC: 144 MMOL/L (ref 135–144)
SODIUM BLD-SCNC: 144 MMOL/L (ref 135–144)
SODIUM BLD-SCNC: 145 MMOL/L (ref 135–144)
SPECIFIC GRAVITY UA: 1.02 (ref 1–1.03)
SPECIFIC GRAVITY UA: 1.02 (ref 1–1.03)
SPECIMEN DESCRIPTION: ABNORMAL
SPECIMEN DESCRIPTION: NORMAL
TOTAL PROTEIN: 5.7 G/DL (ref 6.4–8.3)
TOTAL PROTEIN: 6.2 G/DL (ref 6.4–8.3)
TRICHOMONAS: ABNORMAL
TRICHOMONAS: ABNORMAL
TRIGL SERPL-MCNC: 227 MG/DL
TROPONIN INTERP: ABNORMAL
TROPONIN T: ABNORMAL NG/ML
TROPONIN, HIGH SENSITIVITY: 43 NG/L (ref 0–14)
TROPONIN, HIGH SENSITIVITY: 44 NG/L (ref 0–14)
TROPONIN, HIGH SENSITIVITY: 46 NG/L (ref 0–14)
TURBIDITY: ABNORMAL
TURBIDITY: CLEAR
URINE HGB: ABNORMAL
URINE HGB: ABNORMAL
UROBILINOGEN, URINE: NORMAL
UROBILINOGEN, URINE: NORMAL
VLDLC SERPL CALC-MCNC: ABNORMAL MG/DL (ref 1–30)
WBC # BLD: 5 K/UL (ref 3.5–11.3)
WBC # BLD: 5.2 K/UL (ref 3.5–11.3)
WBC # BLD: 5.7 K/UL (ref 3.5–11.3)
WBC # BLD: 5.8 K/UL (ref 3.5–11.3)
WBC # BLD: 6.2 K/UL (ref 3.5–11.3)
WBC # BLD: 6.2 K/UL (ref 3.5–11.3)
WBC # BLD: 6.4 K/UL (ref 3.5–11.3)
WBC # BLD: 6.4 K/UL (ref 3.5–11.3)
WBC # BLD: 6.9 K/UL (ref 3.5–11.3)
WBC # BLD: 7.7 K/UL (ref 3.5–11.3)
WBC # BLD: ABNORMAL 10*3/UL
WBC UA: ABNORMAL /HPF (ref 0–5)
WBC UA: ABNORMAL /HPF (ref 0–5)
YEAST: ABNORMAL
YEAST: ABNORMAL

## 2021-01-01 PROCEDURE — 80048 BASIC METABOLIC PNL TOTAL CA: CPT

## 2021-01-01 PROCEDURE — 82947 ASSAY GLUCOSE BLOOD QUANT: CPT

## 2021-01-01 PROCEDURE — 36415 COLL VENOUS BLD VENIPUNCTURE: CPT

## 2021-01-01 PROCEDURE — 6360000002 HC RX W HCPCS: Performed by: EMERGENCY MEDICINE

## 2021-01-01 PROCEDURE — 6370000000 HC RX 637 (ALT 250 FOR IP): Performed by: PSYCHIATRY & NEUROLOGY

## 2021-01-01 PROCEDURE — 94640 AIRWAY INHALATION TREATMENT: CPT

## 2021-01-01 PROCEDURE — 4040F PNEUMOC VAC/ADMIN/RCVD: CPT | Performed by: INTERNAL MEDICINE

## 2021-01-01 PROCEDURE — 87088 URINE BACTERIA CULTURE: CPT

## 2021-01-01 PROCEDURE — 84484 ASSAY OF TROPONIN QUANT: CPT

## 2021-01-01 PROCEDURE — 97112 NEUROMUSCULAR REEDUCATION: CPT

## 2021-01-01 PROCEDURE — 6360000002 HC RX W HCPCS: Performed by: INTERNAL MEDICINE

## 2021-01-01 PROCEDURE — 97163 PT EVAL HIGH COMPLEX 45 MIN: CPT

## 2021-01-01 PROCEDURE — 85025 COMPLETE CBC W/AUTO DIFF WBC: CPT

## 2021-01-01 PROCEDURE — 83036 HEMOGLOBIN GLYCOSYLATED A1C: CPT

## 2021-01-01 PROCEDURE — 1200000000 HC SEMI PRIVATE

## 2021-01-01 PROCEDURE — 87086 URINE CULTURE/COLONY COUNT: CPT

## 2021-01-01 PROCEDURE — 93010 ELECTROCARDIOGRAM REPORT: CPT | Performed by: INTERNAL MEDICINE

## 2021-01-01 PROCEDURE — 6370000000 HC RX 637 (ALT 250 FOR IP): Performed by: EMERGENCY MEDICINE

## 2021-01-01 PROCEDURE — 2580000003 HC RX 258: Performed by: EMERGENCY MEDICINE

## 2021-01-01 PROCEDURE — 96374 THER/PROPH/DIAG INJ IV PUSH: CPT

## 2021-01-01 PROCEDURE — 94761 N-INVAS EAR/PLS OXIMETRY MLT: CPT

## 2021-01-01 PROCEDURE — 2580000003 HC RX 258: Performed by: INTERNAL MEDICINE

## 2021-01-01 PROCEDURE — 71045 X-RAY EXAM CHEST 1 VIEW: CPT

## 2021-01-01 PROCEDURE — 93005 ELECTROCARDIOGRAM TRACING: CPT | Performed by: EMERGENCY MEDICINE

## 2021-01-01 PROCEDURE — 96375 TX/PRO/DX INJ NEW DRUG ADDON: CPT

## 2021-01-01 PROCEDURE — G8400 PT W/DXA NO RESULTS DOC: HCPCS | Performed by: INTERNAL MEDICINE

## 2021-01-01 PROCEDURE — 2700000000 HC OXYGEN THERAPY PER DAY

## 2021-01-01 PROCEDURE — 72040 X-RAY EXAM NECK SPINE 2-3 VW: CPT

## 2021-01-01 PROCEDURE — 70547 MR ANGIOGRAPHY NECK W/O DYE: CPT

## 2021-01-01 PROCEDURE — 87040 BLOOD CULTURE FOR BACTERIA: CPT

## 2021-01-01 PROCEDURE — 96401 CHEMO ANTI-NEOPL SQ/IM: CPT

## 2021-01-01 PROCEDURE — G0378 HOSPITAL OBSERVATION PER HR: HCPCS

## 2021-01-01 PROCEDURE — 93005 ELECTROCARDIOGRAM TRACING: CPT | Performed by: INTERNAL MEDICINE

## 2021-01-01 PROCEDURE — 6370000000 HC RX 637 (ALT 250 FOR IP): Performed by: INTERNAL MEDICINE

## 2021-01-01 PROCEDURE — 97535 SELF CARE MNGMENT TRAINING: CPT

## 2021-01-01 PROCEDURE — 1123F ACP DISCUSS/DSCN MKR DOCD: CPT | Performed by: INTERNAL MEDICINE

## 2021-01-01 PROCEDURE — 3017F COLORECTAL CA SCREEN DOC REV: CPT | Performed by: INTERNAL MEDICINE

## 2021-01-01 PROCEDURE — 99232 SBSQ HOSP IP/OBS MODERATE 35: CPT | Performed by: PSYCHIATRY & NEUROLOGY

## 2021-01-01 PROCEDURE — G8484 FLU IMMUNIZE NO ADMIN: HCPCS | Performed by: INTERNAL MEDICINE

## 2021-01-01 PROCEDURE — 96372 THER/PROPH/DIAG INJ SC/IM: CPT

## 2021-01-01 PROCEDURE — 99233 SBSQ HOSP IP/OBS HIGH 50: CPT | Performed by: PSYCHIATRY & NEUROLOGY

## 2021-01-01 PROCEDURE — 99282 EMERGENCY DEPT VISIT SF MDM: CPT

## 2021-01-01 PROCEDURE — 70544 MR ANGIOGRAPHY HEAD W/O DYE: CPT

## 2021-01-01 PROCEDURE — 81001 URINALYSIS AUTO W/SCOPE: CPT

## 2021-01-01 PROCEDURE — 1090F PRES/ABSN URINE INCON ASSESS: CPT | Performed by: INTERNAL MEDICINE

## 2021-01-01 PROCEDURE — 97167 OT EVAL HIGH COMPLEX 60 MIN: CPT

## 2021-01-01 PROCEDURE — 86140 C-REACTIVE PROTEIN: CPT

## 2021-01-01 PROCEDURE — U0002 COVID-19 LAB TEST NON-CDC: HCPCS

## 2021-01-01 PROCEDURE — 83605 ASSAY OF LACTIC ACID: CPT

## 2021-01-01 PROCEDURE — 70551 MRI BRAIN STEM W/O DYE: CPT

## 2021-01-01 PROCEDURE — 83874 ASSAY OF MYOGLOBIN: CPT

## 2021-01-01 PROCEDURE — 85652 RBC SED RATE AUTOMATED: CPT

## 2021-01-01 PROCEDURE — 97116 GAIT TRAINING THERAPY: CPT

## 2021-01-01 PROCEDURE — 99214 OFFICE O/P EST MOD 30 MIN: CPT | Performed by: INTERNAL MEDICINE

## 2021-01-01 PROCEDURE — 73030 X-RAY EXAM OF SHOULDER: CPT

## 2021-01-01 PROCEDURE — 99222 1ST HOSP IP/OBS MODERATE 55: CPT | Performed by: PSYCHIATRY & NEUROLOGY

## 2021-01-01 PROCEDURE — 99211 OFF/OP EST MAY X REQ PHY/QHP: CPT | Performed by: INTERNAL MEDICINE

## 2021-01-01 PROCEDURE — 97530 THERAPEUTIC ACTIVITIES: CPT

## 2021-01-01 PROCEDURE — 80053 COMPREHEN METABOLIC PANEL: CPT

## 2021-01-01 PROCEDURE — 93306 TTE W/DOPPLER COMPLETE: CPT

## 2021-01-01 PROCEDURE — 85730 THROMBOPLASTIN TIME PARTIAL: CPT

## 2021-01-01 PROCEDURE — 99285 EMERGENCY DEPT VISIT HI MDM: CPT

## 2021-01-01 PROCEDURE — 85610 PROTHROMBIN TIME: CPT

## 2021-01-01 PROCEDURE — G8427 DOCREV CUR MEDS BY ELIG CLIN: HCPCS | Performed by: INTERNAL MEDICINE

## 2021-01-01 PROCEDURE — 87186 SC STD MICRODIL/AGAR DIL: CPT

## 2021-01-01 PROCEDURE — 70450 CT HEAD/BRAIN W/O DYE: CPT

## 2021-01-01 PROCEDURE — G8417 CALC BMI ABV UP PARAM F/U: HCPCS | Performed by: INTERNAL MEDICINE

## 2021-01-01 PROCEDURE — 1111F DSCHRG MED/CURRENT MED MERGE: CPT | Performed by: INTERNAL MEDICINE

## 2021-01-01 PROCEDURE — 1036F TOBACCO NON-USER: CPT | Performed by: INTERNAL MEDICINE

## 2021-01-01 PROCEDURE — 80061 LIPID PANEL: CPT

## 2021-01-01 RX ORDER — CARBIDOPA AND LEVODOPA 25; 100 MG/1; MG/1
1 TABLET, EXTENDED RELEASE ORAL ONCE
Status: COMPLETED | OUTPATIENT
Start: 2021-01-01 | End: 2021-01-01

## 2021-01-01 RX ORDER — FAMOTIDINE 20 MG/1
40 TABLET, FILM COATED ORAL NIGHTLY
Status: DISCONTINUED | OUTPATIENT
Start: 2021-01-01 | End: 2021-01-01

## 2021-01-01 RX ORDER — PROMETHAZINE HYDROCHLORIDE 12.5 MG/1
12.5 TABLET ORAL EVERY 6 HOURS PRN
Status: DISCONTINUED | OUTPATIENT
Start: 2021-01-01 | End: 2021-01-01 | Stop reason: HOSPADM

## 2021-01-01 RX ORDER — MODAFINIL 100 MG/1
100 TABLET ORAL EVERY EVENING
COMMUNITY

## 2021-01-01 RX ORDER — MODAFINIL 200 MG/1
100 TABLET ORAL DAILY
Status: DISCONTINUED | OUTPATIENT
Start: 2021-01-01 | End: 2021-01-01

## 2021-01-01 RX ORDER — FAMOTIDINE 40 MG/1
40 TABLET, FILM COATED ORAL NIGHTLY
COMMUNITY

## 2021-01-01 RX ORDER — ORPHENADRINE CITRATE 30 MG/ML
60 INJECTION INTRAMUSCULAR; INTRAVENOUS ONCE
Status: COMPLETED | OUTPATIENT
Start: 2021-01-01 | End: 2021-01-01

## 2021-01-01 RX ORDER — ALBUTEROL SULFATE 2.5 MG/3ML
2.5 SOLUTION RESPIRATORY (INHALATION) 3 TIMES DAILY
COMMUNITY

## 2021-01-01 RX ORDER — ACETAMINOPHEN 325 MG/1
650 TABLET ORAL EVERY 4 HOURS PRN
Status: DISCONTINUED | OUTPATIENT
Start: 2021-01-01 | End: 2021-01-01 | Stop reason: SDUPTHER

## 2021-01-01 RX ORDER — ROPINIROLE 2 MG/1
2 TABLET, FILM COATED ORAL 3 TIMES DAILY
Status: DISCONTINUED | OUTPATIENT
Start: 2021-01-01 | End: 2021-01-01 | Stop reason: HOSPADM

## 2021-01-01 RX ORDER — ISOSORBIDE MONONITRATE 30 MG/1
30 TABLET, EXTENDED RELEASE ORAL DAILY
Status: DISCONTINUED | OUTPATIENT
Start: 2021-01-01 | End: 2021-01-01 | Stop reason: HOSPADM

## 2021-01-01 RX ORDER — RASAGILINE 0.5 MG/1
1 TABLET ORAL DAILY
Status: DISCONTINUED | OUTPATIENT
Start: 2021-01-01 | End: 2021-01-01 | Stop reason: HOSPADM

## 2021-01-01 RX ORDER — ATORVASTATIN CALCIUM 40 MG/1
40 TABLET, FILM COATED ORAL DAILY
Status: DISCONTINUED | OUTPATIENT
Start: 2021-01-01 | End: 2021-01-01 | Stop reason: HOSPADM

## 2021-01-01 RX ORDER — CYANOCOBALAMIN 1000 UG/ML
1000 INJECTION INTRAMUSCULAR; SUBCUTANEOUS ONCE
Status: COMPLETED
Start: 2021-01-01 | End: 2021-01-01

## 2021-01-01 RX ORDER — HYDROCODONE BITARTRATE AND ACETAMINOPHEN 5; 325 MG/1; MG/1
1 TABLET ORAL EVERY 6 HOURS PRN
Qty: 20 TABLET | Refills: 0 | Status: SHIPPED | OUTPATIENT
Start: 2021-01-01 | End: 2021-01-01

## 2021-01-01 RX ORDER — MORPHINE SULFATE 4 MG/ML
4 INJECTION, SOLUTION INTRAMUSCULAR; INTRAVENOUS ONCE
Status: COMPLETED | OUTPATIENT
Start: 2021-01-01 | End: 2021-01-01

## 2021-01-01 RX ORDER — CALCIUM CARBONATE 300MG(750)
400 TABLET,CHEWABLE ORAL 2 TIMES DAILY
Status: DISCONTINUED | OUTPATIENT
Start: 2021-01-01 | End: 2021-01-01 | Stop reason: CLARIF

## 2021-01-01 RX ORDER — ACETAMINOPHEN 500 MG
1000 TABLET ORAL EVERY 6 HOURS PRN
Status: DISCONTINUED | OUTPATIENT
Start: 2021-01-01 | End: 2021-01-01 | Stop reason: HOSPADM

## 2021-01-01 RX ORDER — CYANOCOBALAMIN 1000 UG/ML
1000 INJECTION INTRAMUSCULAR; SUBCUTANEOUS ONCE
Status: CANCELLED
Start: 2021-01-01

## 2021-01-01 RX ORDER — CARBIDOPA AND LEVODOPA 25; 100 MG/1; MG/1
1 TABLET, EXTENDED RELEASE ORAL 4 TIMES DAILY
Status: DISCONTINUED | OUTPATIENT
Start: 2021-01-01 | End: 2021-01-01

## 2021-01-01 RX ORDER — FUROSEMIDE 20 MG/1
20 TABLET ORAL DAILY
Status: DISCONTINUED | OUTPATIENT
Start: 2021-01-01 | End: 2021-01-01 | Stop reason: HOSPADM

## 2021-01-01 RX ORDER — ALBUTEROL SULFATE 2.5 MG/3ML
2.5 SOLUTION RESPIRATORY (INHALATION) 3 TIMES DAILY
Status: DISCONTINUED | OUTPATIENT
Start: 2021-01-01 | End: 2021-01-01 | Stop reason: HOSPADM

## 2021-01-01 RX ORDER — GABAPENTIN 100 MG/1
200 CAPSULE ORAL 3 TIMES DAILY
Status: DISCONTINUED | OUTPATIENT
Start: 2021-01-01 | End: 2021-01-01 | Stop reason: HOSPADM

## 2021-01-01 RX ORDER — ONDANSETRON 2 MG/ML
4 INJECTION INTRAMUSCULAR; INTRAVENOUS EVERY 6 HOURS PRN
Status: DISCONTINUED | OUTPATIENT
Start: 2021-01-01 | End: 2021-01-01 | Stop reason: HOSPADM

## 2021-01-01 RX ORDER — ASCORBIC ACID 125 MG
5000 TABLET,CHEWABLE ORAL 2 TIMES DAILY
Status: DISCONTINUED | OUTPATIENT
Start: 2021-01-01 | End: 2021-01-01 | Stop reason: HOSPADM

## 2021-01-01 RX ORDER — ACETAMINOPHEN 500 MG
500 TABLET ORAL EVERY 6 HOURS PRN
Status: DISCONTINUED | OUTPATIENT
Start: 2021-01-01 | End: 2021-01-01 | Stop reason: HOSPADM

## 2021-01-01 RX ORDER — GABAPENTIN 100 MG/1
200 CAPSULE ORAL 3 TIMES DAILY
Status: ON HOLD | COMMUNITY
End: 2021-01-01 | Stop reason: SDUPTHER

## 2021-01-01 RX ORDER — ACETAMINOPHEN 650 MG/1
650 SUPPOSITORY RECTAL EVERY 6 HOURS PRN
Status: DISCONTINUED | OUTPATIENT
Start: 2021-01-01 | End: 2021-01-01 | Stop reason: HOSPADM

## 2021-01-01 RX ORDER — QUETIAPINE FUMARATE 50 MG/1
50 TABLET, FILM COATED ORAL NIGHTLY
COMMUNITY

## 2021-01-01 RX ORDER — ONDANSETRON 2 MG/ML
4 INJECTION INTRAMUSCULAR; INTRAVENOUS EVERY 8 HOURS PRN
Status: DISCONTINUED | OUTPATIENT
Start: 2021-01-01 | End: 2021-01-01 | Stop reason: SDUPTHER

## 2021-01-01 RX ORDER — POLYETHYLENE GLYCOL 3350 17 G/17G
17 POWDER, FOR SOLUTION ORAL DAILY PRN
Status: DISCONTINUED | OUTPATIENT
Start: 2021-01-01 | End: 2021-01-01 | Stop reason: HOSPADM

## 2021-01-01 RX ORDER — BUDESONIDE AND FORMOTEROL FUMARATE DIHYDRATE 160; 4.5 UG/1; UG/1
2 AEROSOL RESPIRATORY (INHALATION) 2 TIMES DAILY
Status: DISCONTINUED | OUTPATIENT
Start: 2021-01-01 | End: 2021-01-01 | Stop reason: HOSPADM

## 2021-01-01 RX ORDER — MONTELUKAST SODIUM 10 MG/1
10 TABLET ORAL NIGHTLY
COMMUNITY

## 2021-01-01 RX ORDER — QUETIAPINE FUMARATE 25 MG/1
50 TABLET, FILM COATED ORAL NIGHTLY
Status: DISCONTINUED | OUTPATIENT
Start: 2021-01-01 | End: 2021-01-01 | Stop reason: HOSPADM

## 2021-01-01 RX ORDER — MODAFINIL 200 MG/1
200 TABLET ORAL DAILY
Status: DISCONTINUED | OUTPATIENT
Start: 2021-01-01 | End: 2021-01-01 | Stop reason: HOSPADM

## 2021-01-01 RX ORDER — DEXTROSE MONOHYDRATE 25 G/50ML
12.5 INJECTION, SOLUTION INTRAVENOUS PRN
Status: DISCONTINUED | OUTPATIENT
Start: 2021-01-01 | End: 2021-01-01 | Stop reason: HOSPADM

## 2021-01-01 RX ORDER — DEXTROSE MONOHYDRATE 50 MG/ML
100 INJECTION, SOLUTION INTRAVENOUS PRN
Status: DISCONTINUED | OUTPATIENT
Start: 2021-01-01 | End: 2021-01-01 | Stop reason: HOSPADM

## 2021-01-01 RX ORDER — FAMOTIDINE 20 MG/1
40 TABLET, FILM COATED ORAL NIGHTLY
Status: DISCONTINUED | OUTPATIENT
Start: 2021-01-01 | End: 2021-01-01 | Stop reason: HOSPADM

## 2021-01-01 RX ORDER — CIPROFLOXACIN 250 MG/1
250 TABLET, FILM COATED ORAL EVERY 12 HOURS SCHEDULED
Qty: 10 TABLET | Refills: 0 | DISCHARGE
Start: 2021-01-01 | End: 2021-01-01

## 2021-01-01 RX ORDER — SODIUM CHLORIDE 0.9 % (FLUSH) 0.9 %
10 SYRINGE (ML) INJECTION PRN
Status: DISCONTINUED | OUTPATIENT
Start: 2021-01-01 | End: 2021-01-01 | Stop reason: HOSPADM

## 2021-01-01 RX ORDER — CALCIUM CARBONATE/VITAMIN D3 250-3.125
2 TABLET ORAL DAILY
Status: DISCONTINUED | OUTPATIENT
Start: 2021-01-01 | End: 2021-01-01 | Stop reason: HOSPADM

## 2021-01-01 RX ORDER — LANOLIN ALCOHOL/MO/W.PET/CERES
325 CREAM (GRAM) TOPICAL 2 TIMES DAILY WITH MEALS
Status: DISCONTINUED | OUTPATIENT
Start: 2021-01-01 | End: 2021-01-01 | Stop reason: HOSPADM

## 2021-01-01 RX ORDER — MODAFINIL 100 MG/1
100 TABLET ORAL DAILY
Status: DISCONTINUED | OUTPATIENT
Start: 2021-01-01 | End: 2021-01-01 | Stop reason: HOSPADM

## 2021-01-01 RX ORDER — ACETAMINOPHEN 325 MG/1
650 TABLET ORAL EVERY 6 HOURS PRN
Status: DISCONTINUED | OUTPATIENT
Start: 2021-01-01 | End: 2021-01-01 | Stop reason: HOSPADM

## 2021-01-01 RX ORDER — SODIUM CHLORIDE 9 MG/ML
INJECTION, SOLUTION INTRAVENOUS CONTINUOUS
Status: DISCONTINUED | OUTPATIENT
Start: 2021-01-01 | End: 2021-01-01

## 2021-01-01 RX ORDER — PANTOPRAZOLE SODIUM 40 MG/1
40 TABLET, DELAYED RELEASE ORAL DAILY
COMMUNITY

## 2021-01-01 RX ORDER — MODAFINIL 200 MG/1
200 TABLET ORAL DAILY
COMMUNITY

## 2021-01-01 RX ORDER — CIPROFLOXACIN 250 MG/1
250 TABLET, FILM COATED ORAL EVERY 12 HOURS SCHEDULED
Status: DISCONTINUED | OUTPATIENT
Start: 2021-01-01 | End: 2021-01-01 | Stop reason: HOSPADM

## 2021-01-01 RX ORDER — NICOTINE POLACRILEX 4 MG
15 LOZENGE BUCCAL PRN
Status: DISCONTINUED | OUTPATIENT
Start: 2021-01-01 | End: 2021-01-01 | Stop reason: HOSPADM

## 2021-01-01 RX ORDER — ATORVASTATIN CALCIUM 20 MG/1
20 TABLET, FILM COATED ORAL DAILY
Status: DISCONTINUED | OUTPATIENT
Start: 2021-01-01 | End: 2021-01-01

## 2021-01-01 RX ORDER — SODIUM CHLORIDE 0.9 % (FLUSH) 0.9 %
10 SYRINGE (ML) INJECTION EVERY 12 HOURS SCHEDULED
Status: DISCONTINUED | OUTPATIENT
Start: 2021-01-01 | End: 2021-01-01 | Stop reason: HOSPADM

## 2021-01-01 RX ORDER — ALBUTEROL SULFATE 90 UG/1
2 AEROSOL, METERED RESPIRATORY (INHALATION) EVERY 6 HOURS PRN
COMMUNITY

## 2021-01-01 RX ORDER — PANTOPRAZOLE SODIUM 40 MG/1
40 TABLET, DELAYED RELEASE ORAL DAILY
Status: DISCONTINUED | OUTPATIENT
Start: 2021-01-01 | End: 2021-01-01 | Stop reason: HOSPADM

## 2021-01-01 RX ORDER — ALBUTEROL SULFATE 2.5 MG/3ML
SOLUTION RESPIRATORY (INHALATION)
Status: DISPENSED
Start: 2021-01-01 | End: 2021-01-01

## 2021-01-01 RX ORDER — FAMOTIDINE 40 MG/1
40 TABLET, FILM COATED ORAL NIGHTLY
Status: DISCONTINUED | OUTPATIENT
Start: 2021-01-01 | End: 2021-01-01 | Stop reason: CLARIF

## 2021-01-01 RX ORDER — RASAGILINE 1 MG/1
1 TABLET ORAL DAILY
Status: DISCONTINUED | OUTPATIENT
Start: 2021-01-01 | End: 2021-01-01 | Stop reason: CLARIF

## 2021-01-01 RX ORDER — MODAFINIL 100 MG/1
100 TABLET ORAL EVERY EVENING
Status: DISCONTINUED | OUTPATIENT
Start: 2021-01-01 | End: 2021-01-01

## 2021-01-01 RX ORDER — FUROSEMIDE 40 MG/1
20 TABLET ORAL DAILY
COMMUNITY

## 2021-01-01 RX ORDER — ROPINIROLE 2 MG/1
2 TABLET, FILM COATED ORAL ONCE
Status: COMPLETED | OUTPATIENT
Start: 2021-01-01 | End: 2021-01-01

## 2021-01-01 RX ORDER — AMIODARONE HYDROCHLORIDE 200 MG/1
200 TABLET ORAL DAILY
Status: DISCONTINUED | OUTPATIENT
Start: 2021-01-01 | End: 2021-01-01 | Stop reason: HOSPADM

## 2021-01-01 RX ORDER — CALCITRIOL 0.25 UG/1
0.25 CAPSULE, LIQUID FILLED ORAL 2 TIMES DAILY
Status: DISCONTINUED | OUTPATIENT
Start: 2021-01-01 | End: 2021-01-01 | Stop reason: HOSPADM

## 2021-01-01 RX ORDER — MONTELUKAST SODIUM 10 MG/1
10 TABLET ORAL NIGHTLY
Status: DISCONTINUED | OUTPATIENT
Start: 2021-01-01 | End: 2021-01-01 | Stop reason: HOSPADM

## 2021-01-01 RX ORDER — GABAPENTIN 100 MG/1
200 CAPSULE ORAL 3 TIMES DAILY
Qty: 90 CAPSULE | Refills: 0 | Status: SHIPPED | OUTPATIENT
Start: 2021-01-01 | End: 2021-01-01

## 2021-01-01 RX ADMIN — APIXABAN 5 MG: 5 TABLET, FILM COATED ORAL at 09:00

## 2021-01-01 RX ADMIN — METOPROLOL TARTRATE 12.5 MG: 25 TABLET, FILM COATED ORAL at 21:15

## 2021-01-01 RX ADMIN — Medication 500 MG: at 23:49

## 2021-01-01 RX ADMIN — CARBIDOPA AND LEVODOPA 1 TABLET: 25; 100 TABLET ORAL at 21:19

## 2021-01-01 RX ADMIN — ROPINIROLE HYDROCHLORIDE 2 MG: 2 TABLET, FILM COATED ORAL at 13:58

## 2021-01-01 RX ADMIN — SODIUM CHLORIDE: 9 INJECTION, SOLUTION INTRAVENOUS at 14:18

## 2021-01-01 RX ADMIN — SODIUM CHLORIDE, PRESERVATIVE FREE 10 ML: 5 INJECTION INTRAVENOUS at 21:13

## 2021-01-01 RX ADMIN — FUROSEMIDE 20 MG: 20 TABLET ORAL at 09:00

## 2021-01-01 RX ADMIN — INSULIN LISPRO 1 UNITS: 100 INJECTION, SOLUTION INTRAVENOUS; SUBCUTANEOUS at 11:45

## 2021-01-01 RX ADMIN — ALBUTEROL SULFATE 2.5 MG: 2.5 SOLUTION RESPIRATORY (INHALATION) at 10:09

## 2021-01-01 RX ADMIN — CYANOCOBALAMIN 1000 MCG: 1000 INJECTION, SOLUTION INTRAMUSCULAR at 11:35

## 2021-01-01 RX ADMIN — ROPINIROLE HYDROCHLORIDE 2 MG: 2 TABLET, FILM COATED ORAL at 20:37

## 2021-01-01 RX ADMIN — ISOSORBIDE MONONITRATE 30 MG: 30 TABLET ORAL at 09:44

## 2021-01-01 RX ADMIN — Medication 400 MG: at 10:08

## 2021-01-01 RX ADMIN — ATORVASTATIN CALCIUM 20 MG: 20 TABLET, FILM COATED ORAL at 21:18

## 2021-01-01 RX ADMIN — CARBIDOPA AND LEVODOPA 1 TABLET: 25; 100 TABLET ORAL at 13:58

## 2021-01-01 RX ADMIN — CALCITRIOL 0.25 MCG: 0.25 CAPSULE ORAL at 09:00

## 2021-01-01 RX ADMIN — APIXABAN 5 MG: 5 TABLET, FILM COATED ORAL at 20:38

## 2021-01-01 RX ADMIN — MAGNESIUM GLUCONATE 500 MG ORAL TABLET 400 MG: 500 TABLET ORAL at 17:51

## 2021-01-01 RX ADMIN — MONTELUKAST 10 MG: 10 TABLET, FILM COATED ORAL at 21:17

## 2021-01-01 RX ADMIN — ROPINIROLE HYDROCHLORIDE 2 MG: 2 TABLET, FILM COATED ORAL at 09:00

## 2021-01-01 RX ADMIN — GABAPENTIN 200 MG: 100 CAPSULE ORAL at 20:52

## 2021-01-01 RX ADMIN — MORPHINE SULFATE 4 MG: 4 INJECTION, SOLUTION INTRAMUSCULAR; INTRAVENOUS at 09:42

## 2021-01-01 RX ADMIN — FERROUS SULFATE TAB EC 325 MG (65 MG FE EQUIVALENT) 325 MG: 325 (65 FE) TABLET DELAYED RESPONSE at 10:00

## 2021-01-01 RX ADMIN — ROPINIROLE HYDROCHLORIDE 2 MG: 2 TABLET, FILM COATED ORAL at 14:18

## 2021-01-01 RX ADMIN — ROPINIROLE HYDROCHLORIDE 2 MG: 2 TABLET, FILM COATED ORAL at 14:39

## 2021-01-01 RX ADMIN — MONTELUKAST 10 MG: 10 TABLET, FILM COATED ORAL at 20:38

## 2021-01-01 RX ADMIN — CYANOCOBALAMIN 1000 MCG: 1000 INJECTION, SOLUTION INTRAMUSCULAR at 13:49

## 2021-01-01 RX ADMIN — Medication 5000 MCG: at 07:38

## 2021-01-01 RX ADMIN — PANTOPRAZOLE SODIUM 40 MG: 40 TABLET, DELAYED RELEASE ORAL at 09:44

## 2021-01-01 RX ADMIN — ISOSORBIDE MONONITRATE 30 MG: 30 TABLET ORAL at 09:00

## 2021-01-01 RX ADMIN — MAGNESIUM GLUCONATE 500 MG ORAL TABLET 400 MG: 500 TABLET ORAL at 12:16

## 2021-01-01 RX ADMIN — SODIUM CHLORIDE, PRESERVATIVE FREE 10 ML: 5 INJECTION INTRAVENOUS at 20:38

## 2021-01-01 RX ADMIN — FERROUS SULFATE TAB EC 325 MG (65 MG FE EQUIVALENT) 325 MG: 325 (65 FE) TABLET DELAYED RESPONSE at 17:51

## 2021-01-01 RX ADMIN — BUDESONIDE AND FORMOTEROL FUMARATE DIHYDRATE 2 PUFF: 160; 4.5 AEROSOL RESPIRATORY (INHALATION) at 19:55

## 2021-01-01 RX ADMIN — ALBUTEROL SULFATE 2.5 MG: 2.5 SOLUTION RESPIRATORY (INHALATION) at 20:44

## 2021-01-01 RX ADMIN — PANTOPRAZOLE SODIUM 40 MG: 40 TABLET, DELAYED RELEASE ORAL at 10:08

## 2021-01-01 RX ADMIN — CARBIDOPA AND LEVODOPA 1 TABLET: 25; 100 TABLET ORAL at 13:05

## 2021-01-01 RX ADMIN — MODAFINIL 100 MG: 100 TABLET ORAL at 14:24

## 2021-01-01 RX ADMIN — Medication 5000 MCG: at 09:05

## 2021-01-01 RX ADMIN — ALBUTEROL SULFATE 2.5 MG: 2.5 SOLUTION RESPIRATORY (INHALATION) at 15:16

## 2021-01-01 RX ADMIN — ACETAMINOPHEN 1000 MG: 500 TABLET ORAL at 20:52

## 2021-01-01 RX ADMIN — BUDESONIDE AND FORMOTEROL FUMARATE DIHYDRATE 2 PUFF: 160; 4.5 AEROSOL RESPIRATORY (INHALATION) at 09:15

## 2021-01-01 RX ADMIN — AMIODARONE HYDROCHLORIDE 200 MG: 200 TABLET ORAL at 07:41

## 2021-01-01 RX ADMIN — SODIUM CHLORIDE, PRESERVATIVE FREE 10 ML: 5 INJECTION INTRAVENOUS at 20:39

## 2021-01-01 RX ADMIN — CARBIDOPA AND LEVODOPA 1 TABLET: 25; 100 TABLET ORAL at 14:24

## 2021-01-01 RX ADMIN — CALCITRIOL 0.25 MCG: 0.25 CAPSULE ORAL at 20:54

## 2021-01-01 RX ADMIN — Medication 400 MG: at 07:40

## 2021-01-01 RX ADMIN — APIXABAN 2.5 MG: 2.5 TABLET, FILM COATED ORAL at 10:23

## 2021-01-01 RX ADMIN — ACETAMINOPHEN 650 MG: 325 TABLET ORAL at 06:00

## 2021-01-01 RX ADMIN — MODAFINIL 200 MG: 200 TABLET ORAL at 10:09

## 2021-01-01 RX ADMIN — Medication 5000 MCG: at 20:59

## 2021-01-01 RX ADMIN — RASAGILINE 1 MG: 0.5 TABLET ORAL at 08:59

## 2021-01-01 RX ADMIN — BUDESONIDE AND FORMOTEROL FUMARATE DIHYDRATE 2 PUFF: 160; 4.5 AEROSOL RESPIRATORY (INHALATION) at 21:15

## 2021-01-01 RX ADMIN — CARBIDOPA AND LEVODOPA 1 TABLET: 25; 100 TABLET ORAL at 20:38

## 2021-01-01 RX ADMIN — APIXABAN 5 MG: 5 TABLET, FILM COATED ORAL at 21:22

## 2021-01-01 RX ADMIN — MODAFINIL 100 MG: 100 TABLET ORAL at 14:00

## 2021-01-01 RX ADMIN — FERROUS SULFATE TAB EC 325 MG (65 MG FE EQUIVALENT) 325 MG: 325 (65 FE) TABLET DELAYED RESPONSE at 07:43

## 2021-01-01 RX ADMIN — GABAPENTIN 200 MG: 100 CAPSULE ORAL at 14:03

## 2021-01-01 RX ADMIN — MONTELUKAST 10 MG: 10 TABLET, FILM COATED ORAL at 23:44

## 2021-01-01 RX ADMIN — ISOSORBIDE MONONITRATE 30 MG: 30 TABLET ORAL at 10:07

## 2021-01-01 RX ADMIN — CARBIDOPA AND LEVODOPA 1 TABLET: 25; 100 TABLET, EXTENDED RELEASE ORAL at 14:18

## 2021-01-01 RX ADMIN — GABAPENTIN 200 MG: 100 CAPSULE ORAL at 23:44

## 2021-01-01 RX ADMIN — MODAFINIL 200 MG: 200 TABLET ORAL at 09:00

## 2021-01-01 RX ADMIN — BUDESONIDE AND FORMOTEROL FUMARATE DIHYDRATE 2 PUFF: 160; 4.5 AEROSOL RESPIRATORY (INHALATION) at 20:44

## 2021-01-01 RX ADMIN — GABAPENTIN 200 MG: 100 CAPSULE ORAL at 14:39

## 2021-01-01 RX ADMIN — ACETAMINOPHEN 1000 MG: 500 TABLET ORAL at 02:46

## 2021-01-01 RX ADMIN — SODIUM CHLORIDE, PRESERVATIVE FREE 10 ML: 5 INJECTION INTRAVENOUS at 21:18

## 2021-01-01 RX ADMIN — FUROSEMIDE 20 MG: 20 TABLET ORAL at 10:03

## 2021-01-01 RX ADMIN — AMIODARONE HYDROCHLORIDE 200 MG: 200 TABLET ORAL at 09:43

## 2021-01-01 RX ADMIN — METOPROLOL TARTRATE 12.5 MG: 25 TABLET, FILM COATED ORAL at 23:43

## 2021-01-01 RX ADMIN — TIOTROPIUM BROMIDE INHALATION SPRAY 2 PUFF: 3.12 SPRAY, METERED RESPIRATORY (INHALATION) at 10:11

## 2021-01-01 RX ADMIN — CARBIDOPA AND LEVODOPA 1 TABLET: 25; 100 TABLET ORAL at 10:05

## 2021-01-01 RX ADMIN — DARBEPOETIN ALFA 200 MCG: 200 INJECTION, SOLUTION INTRAVENOUS; SUBCUTANEOUS at 11:43

## 2021-01-01 RX ADMIN — Medication 400 MG: at 21:23

## 2021-01-01 RX ADMIN — GABAPENTIN 200 MG: 100 CAPSULE ORAL at 14:24

## 2021-01-01 RX ADMIN — ROPINIROLE HYDROCHLORIDE 2 MG: 2 TABLET, FILM COATED ORAL at 07:40

## 2021-01-01 RX ADMIN — CARBIDOPA AND LEVODOPA 1 TABLET: 25; 100 TABLET ORAL at 17:42

## 2021-01-01 RX ADMIN — GABAPENTIN 200 MG: 100 CAPSULE ORAL at 10:02

## 2021-01-01 RX ADMIN — ROPINIROLE HYDROCHLORIDE 2 MG: 2 TABLET, FILM COATED ORAL at 10:01

## 2021-01-01 RX ADMIN — GABAPENTIN 200 MG: 100 CAPSULE ORAL at 07:34

## 2021-01-01 RX ADMIN — FERROUS SULFATE TAB EC 325 MG (65 MG FE EQUIVALENT) 325 MG: 325 (65 FE) TABLET DELAYED RESPONSE at 17:06

## 2021-01-01 RX ADMIN — AMIODARONE HYDROCHLORIDE 200 MG: 200 TABLET ORAL at 10:04

## 2021-01-01 RX ADMIN — RASAGILINE 1 MG: 1 TABLET ORAL at 10:07

## 2021-01-01 RX ADMIN — SODIUM CHLORIDE, PRESERVATIVE FREE 10 ML: 5 INJECTION INTRAVENOUS at 09:00

## 2021-01-01 RX ADMIN — ACETAMINOPHEN 650 MG: 325 TABLET ORAL at 17:00

## 2021-01-01 RX ADMIN — ALBUTEROL SULFATE 2.5 MG: 2.5 SOLUTION RESPIRATORY (INHALATION) at 14:37

## 2021-01-01 RX ADMIN — Medication 5000 MCG: at 09:53

## 2021-01-01 RX ADMIN — ROPINIROLE HYDROCHLORIDE 2 MG: 2 TABLET, FILM COATED ORAL at 09:44

## 2021-01-01 RX ADMIN — CALCITRIOL 0.25 MCG: 0.25 CAPSULE ORAL at 09:44

## 2021-01-01 RX ADMIN — CALCITRIOL 0.25 MCG: 0.25 CAPSULE ORAL at 07:39

## 2021-01-01 RX ADMIN — ROPINIROLE HYDROCHLORIDE 2 MG: 2 TABLET, FILM COATED ORAL at 23:45

## 2021-01-01 RX ADMIN — GABAPENTIN 200 MG: 100 CAPSULE ORAL at 14:00

## 2021-01-01 RX ADMIN — CIPROFLOXACIN 250 MG: 250 TABLET, FILM COATED ORAL at 20:38

## 2021-01-01 RX ADMIN — CARBIDOPA AND LEVODOPA 1 TABLET: 25; 100 TABLET ORAL at 09:44

## 2021-01-01 RX ADMIN — CARBIDOPA AND LEVODOPA 1 TABLET: 25; 100 TABLET ORAL at 17:06

## 2021-01-01 RX ADMIN — FAMOTIDINE 40 MG: 40 TABLET, FILM COATED ORAL at 21:18

## 2021-01-01 RX ADMIN — CARBIDOPA AND LEVODOPA 1 TABLET: 25; 100 TABLET ORAL at 17:02

## 2021-01-01 RX ADMIN — SODIUM CHLORIDE, PRESERVATIVE FREE 10 ML: 5 INJECTION INTRAVENOUS at 09:46

## 2021-01-01 RX ADMIN — MAGNESIUM GLUCONATE 500 MG ORAL TABLET 400 MG: 500 TABLET ORAL at 11:53

## 2021-01-01 RX ADMIN — FERROUS SULFATE TAB EC 325 MG (65 MG FE EQUIVALENT) 325 MG: 325 (65 FE) TABLET DELAYED RESPONSE at 17:42

## 2021-01-01 RX ADMIN — CARBIDOPA AND LEVODOPA 1 TABLET: 25; 100 TABLET ORAL at 20:54

## 2021-01-01 RX ADMIN — MAGNESIUM GLUCONATE 500 MG ORAL TABLET 400 MG: 500 TABLET ORAL at 20:59

## 2021-01-01 RX ADMIN — MONTELUKAST 10 MG: 10 TABLET, FILM COATED ORAL at 20:54

## 2021-01-01 RX ADMIN — MODAFINIL 100 MG: 100 TABLET ORAL at 14:39

## 2021-01-01 RX ADMIN — AMIODARONE HYDROCHLORIDE 200 MG: 200 TABLET ORAL at 09:00

## 2021-01-01 RX ADMIN — FAMOTIDINE 40 MG: 20 TABLET ORAL at 20:59

## 2021-01-01 RX ADMIN — LINAGLIPTIN 5 MG: 5 TABLET, FILM COATED ORAL at 09:00

## 2021-01-01 RX ADMIN — ACETAMINOPHEN 1000 MG: 500 TABLET ORAL at 02:50

## 2021-01-01 RX ADMIN — CIPROFLOXACIN 250 MG: 250 TABLET, FILM COATED ORAL at 09:58

## 2021-01-01 RX ADMIN — FAMOTIDINE 40 MG: 20 TABLET ORAL at 20:37

## 2021-01-01 RX ADMIN — FAMOTIDINE 40 MG: 20 TABLET ORAL at 23:44

## 2021-01-01 RX ADMIN — ACETAMINOPHEN 650 MG: 325 TABLET ORAL at 23:55

## 2021-01-01 RX ADMIN — FERROUS SULFATE TAB EC 325 MG (65 MG FE EQUIVALENT) 325 MG: 325 (65 FE) TABLET DELAYED RESPONSE at 17:02

## 2021-01-01 RX ADMIN — METOPROLOL TARTRATE 12.5 MG: 25 TABLET, FILM COATED ORAL at 20:53

## 2021-01-01 RX ADMIN — DARBEPOETIN ALFA 200 MCG: 200 INJECTION, SOLUTION INTRAVENOUS; SUBCUTANEOUS at 11:35

## 2021-01-01 RX ADMIN — CARBIDOPA AND LEVODOPA 1 TABLET: 25; 100 TABLET ORAL at 07:42

## 2021-01-01 RX ADMIN — TIOTROPIUM BROMIDE INHALATION SPRAY 2 PUFF: 3.12 SPRAY, METERED RESPIRATORY (INHALATION) at 09:36

## 2021-01-01 RX ADMIN — SODIUM CHLORIDE, PRESERVATIVE FREE 10 ML: 5 INJECTION INTRAVENOUS at 20:52

## 2021-01-01 RX ADMIN — RASAGILINE 1 MG: 0.5 TABLET ORAL at 09:45

## 2021-01-01 RX ADMIN — QUETIAPINE FUMARATE 50 MG: 25 TABLET ORAL at 23:44

## 2021-01-01 RX ADMIN — SODIUM CHLORIDE, PRESERVATIVE FREE 10 ML: 5 INJECTION INTRAVENOUS at 23:56

## 2021-01-01 RX ADMIN — ROPINIROLE HYDROCHLORIDE 2 MG: 2 TABLET, FILM COATED ORAL at 20:52

## 2021-01-01 RX ADMIN — APIXABAN 5 MG: 5 TABLET, FILM COATED ORAL at 07:37

## 2021-01-01 RX ADMIN — ATORVASTATIN CALCIUM 40 MG: 40 TABLET, FILM COATED ORAL at 20:54

## 2021-01-01 RX ADMIN — DARBEPOETIN ALFA 200 MCG: 200 INJECTION, SOLUTION INTRAVENOUS; SUBCUTANEOUS at 14:53

## 2021-01-01 RX ADMIN — CALCITRIOL 0.25 MCG: 0.25 CAPSULE ORAL at 10:03

## 2021-01-01 RX ADMIN — FERROUS SULFATE TAB EC 325 MG (65 MG FE EQUIVALENT) 325 MG: 325 (65 FE) TABLET DELAYED RESPONSE at 11:53

## 2021-01-01 RX ADMIN — ALBUTEROL SULFATE 2.5 MG: 2.5 SOLUTION RESPIRATORY (INHALATION) at 19:54

## 2021-01-01 RX ADMIN — BUDESONIDE AND FORMOTEROL FUMARATE DIHYDRATE 2 PUFF: 160; 4.5 AEROSOL RESPIRATORY (INHALATION) at 12:14

## 2021-01-01 RX ADMIN — METOPROLOL TARTRATE 12.5 MG: 25 TABLET, FILM COATED ORAL at 09:00

## 2021-01-01 RX ADMIN — FUROSEMIDE 20 MG: 20 TABLET ORAL at 07:42

## 2021-01-01 RX ADMIN — CYANOCOBALAMIN 1000 MCG: 1000 INJECTION, SOLUTION INTRAMUSCULAR at 14:53

## 2021-01-01 RX ADMIN — QUETIAPINE FUMARATE 50 MG: 25 TABLET ORAL at 20:37

## 2021-01-01 RX ADMIN — ATORVASTATIN CALCIUM 40 MG: 40 TABLET, FILM COATED ORAL at 20:37

## 2021-01-01 RX ADMIN — Medication 5000 MCG: at 21:23

## 2021-01-01 RX ADMIN — QUETIAPINE FUMARATE 50 MG: 25 TABLET ORAL at 22:08

## 2021-01-01 RX ADMIN — Medication 500 MG: at 12:16

## 2021-01-01 RX ADMIN — MODAFINIL 200 MG: 200 TABLET ORAL at 09:44

## 2021-01-01 RX ADMIN — FERROUS SULFATE TAB EC 325 MG (65 MG FE EQUIVALENT) 325 MG: 325 (65 FE) TABLET DELAYED RESPONSE at 12:17

## 2021-01-01 RX ADMIN — LINAGLIPTIN 5 MG: 5 TABLET, FILM COATED ORAL at 10:02

## 2021-01-01 RX ADMIN — BUDESONIDE AND FORMOTEROL FUMARATE DIHYDRATE 2 PUFF: 160; 4.5 AEROSOL RESPIRATORY (INHALATION) at 09:36

## 2021-01-01 RX ADMIN — CYANOCOBALAMIN 1000 MCG: 1000 INJECTION, SOLUTION INTRAMUSCULAR at 11:43

## 2021-01-01 RX ADMIN — GABAPENTIN 200 MG: 100 CAPSULE ORAL at 08:59

## 2021-01-01 RX ADMIN — GABAPENTIN 200 MG: 100 CAPSULE ORAL at 21:17

## 2021-01-01 RX ADMIN — ROPINIROLE HYDROCHLORIDE 2 MG: 2 TABLET, FILM COATED ORAL at 14:24

## 2021-01-01 RX ADMIN — MAGNESIUM GLUCONATE 500 MG ORAL TABLET 400 MG: 500 TABLET ORAL at 17:06

## 2021-01-01 RX ADMIN — ALBUTEROL SULFATE 2.5 MG: 2.5 SOLUTION RESPIRATORY (INHALATION) at 21:15

## 2021-01-01 RX ADMIN — GABAPENTIN 200 MG: 100 CAPSULE ORAL at 09:45

## 2021-01-01 RX ADMIN — MODAFINIL 100 MG: 100 TABLET ORAL at 17:02

## 2021-01-01 RX ADMIN — METOPROLOL TARTRATE 12.5 MG: 25 TABLET, FILM COATED ORAL at 09:46

## 2021-01-01 RX ADMIN — CARBIDOPA AND LEVODOPA 1 TABLET: 25; 100 TABLET ORAL at 14:03

## 2021-01-01 RX ADMIN — ALBUTEROL SULFATE 2.5 MG: 2.5 SOLUTION RESPIRATORY (INHALATION) at 12:14

## 2021-01-01 RX ADMIN — BUDESONIDE AND FORMOTEROL FUMARATE DIHYDRATE 2 PUFF: 160; 4.5 AEROSOL RESPIRATORY (INHALATION) at 10:11

## 2021-01-01 RX ADMIN — ISOSORBIDE MONONITRATE 30 MG: 30 TABLET ORAL at 07:43

## 2021-01-01 RX ADMIN — RASAGILINE 1 MG: 1 TABLET ORAL at 07:39

## 2021-01-01 RX ADMIN — METOPROLOL TARTRATE 12.5 MG: 25 TABLET, FILM COATED ORAL at 10:06

## 2021-01-01 RX ADMIN — QUETIAPINE FUMARATE 50 MG: 25 TABLET ORAL at 21:19

## 2021-01-01 RX ADMIN — Medication 5000 MCG: at 10:00

## 2021-01-01 RX ADMIN — FUROSEMIDE 20 MG: 20 TABLET ORAL at 09:46

## 2021-01-01 RX ADMIN — APIXABAN 2.5 MG: 2.5 TABLET, FILM COATED ORAL at 23:44

## 2021-01-01 RX ADMIN — CALCITRIOL 0.25 MCG: 0.25 CAPSULE ORAL at 21:16

## 2021-01-01 RX ADMIN — GABAPENTIN 200 MG: 100 CAPSULE ORAL at 20:38

## 2021-01-01 RX ADMIN — METOPROLOL TARTRATE 12.5 MG: 25 TABLET, FILM COATED ORAL at 07:34

## 2021-01-01 RX ADMIN — ROPINIROLE HYDROCHLORIDE 2 MG: 2 TABLET, FILM COATED ORAL at 14:03

## 2021-01-01 RX ADMIN — ATORVASTATIN CALCIUM 20 MG: 20 TABLET, FILM COATED ORAL at 23:49

## 2021-01-01 RX ADMIN — APIXABAN 5 MG: 5 TABLET, FILM COATED ORAL at 09:43

## 2021-01-01 RX ADMIN — ORPHENADRINE CITRATE 60 MG: 60 INJECTION INTRAMUSCULAR; INTRAVENOUS at 09:41

## 2021-01-01 RX ADMIN — PANTOPRAZOLE SODIUM 40 MG: 40 TABLET, DELAYED RELEASE ORAL at 07:38

## 2021-01-01 RX ADMIN — TIOTROPIUM BROMIDE INHALATION SPRAY 2 PUFF: 3.12 SPRAY, METERED RESPIRATORY (INHALATION) at 12:15

## 2021-01-01 RX ADMIN — CARBIDOPA AND LEVODOPA 1 TABLET: 25; 100 TABLET, EXTENDED RELEASE ORAL at 23:44

## 2021-01-01 RX ADMIN — CIPROFLOXACIN 250 MG: 250 TABLET, FILM COATED ORAL at 09:00

## 2021-01-01 RX ADMIN — APIXABAN 5 MG: 5 TABLET, FILM COATED ORAL at 20:53

## 2021-01-01 RX ADMIN — SODIUM CHLORIDE, PRESERVATIVE FREE 10 ML: 5 INJECTION INTRAVENOUS at 07:46

## 2021-01-01 RX ADMIN — LINAGLIPTIN 5 MG: 5 TABLET, FILM COATED ORAL at 09:43

## 2021-01-01 RX ADMIN — PANTOPRAZOLE SODIUM 40 MG: 40 TABLET, DELAYED RELEASE ORAL at 08:59

## 2021-01-01 RX ADMIN — CARBIDOPA AND LEVODOPA 1 TABLET: 25; 100 TABLET ORAL at 17:51

## 2021-01-01 RX ADMIN — MODAFINIL 200 MG: 200 TABLET ORAL at 07:36

## 2021-01-01 RX ADMIN — ROPINIROLE HYDROCHLORIDE 2 MG: 2 TABLET, FILM COATED ORAL at 21:16

## 2021-01-01 RX ADMIN — LINAGLIPTIN 5 MG: 5 TABLET, FILM COATED ORAL at 07:36

## 2021-01-01 RX ADMIN — DARBEPOETIN ALFA 200 MCG: 200 INJECTION, SOLUTION INTRAVENOUS; SUBCUTANEOUS at 13:51

## 2021-01-01 RX ADMIN — CALCITRIOL 0.25 MCG: 0.25 CAPSULE ORAL at 20:37

## 2021-01-01 RX ADMIN — Medication 5000 MCG: at 20:38

## 2021-01-01 RX ADMIN — ALBUTEROL SULFATE 2.5 MG: 2.5 SOLUTION RESPIRATORY (INHALATION) at 20:38

## 2021-01-01 RX ADMIN — ALBUTEROL SULFATE 2.5 MG: 2.5 SOLUTION RESPIRATORY (INHALATION) at 09:32

## 2021-01-01 RX ADMIN — Medication 500 MG: at 11:53

## 2021-01-01 RX ADMIN — CARBIDOPA AND LEVODOPA 1 TABLET: 25; 100 TABLET ORAL at 09:00

## 2021-01-01 ASSESSMENT — PAIN SCALES - GENERAL
PAINLEVEL_OUTOF10: 3
PAINLEVEL_OUTOF10: 10
PAINLEVEL_OUTOF10: 10
PAINLEVEL_OUTOF10: 8
PAINLEVEL_OUTOF10: 10
PAINLEVEL_OUTOF10: 10
PAINLEVEL_OUTOF10: 0
PAINLEVEL_OUTOF10: 5
PAINLEVEL_OUTOF10: 0
PAINLEVEL_OUTOF10: 10
PAINLEVEL_OUTOF10: 10

## 2021-01-01 ASSESSMENT — PAIN DESCRIPTION - LOCATION
LOCATION: ARM
LOCATION: GENERALIZED

## 2021-01-01 ASSESSMENT — ENCOUNTER SYMPTOMS
ABDOMINAL PAIN: 0
VOMITING: 0
EYE DISCHARGE: 0
SHORTNESS OF BREATH: 0
TROUBLE SWALLOWING: 0
CONSTIPATION: 0
COLOR CHANGE: 0
SHORTNESS OF BREATH: 0
FACIAL SWELLING: 0
DIARRHEA: 0
EYE REDNESS: 0
COUGH: 0
ABDOMINAL PAIN: 0
VOMITING: 0

## 2021-01-01 ASSESSMENT — PAIN DESCRIPTION - PAIN TYPE
TYPE: ACUTE PAIN;CHRONIC PAIN
TYPE: CHRONIC PAIN
TYPE: ACUTE PAIN
TYPE: ACUTE PAIN

## 2021-01-01 ASSESSMENT — PAIN DESCRIPTION - DESCRIPTORS
DESCRIPTORS: ACHING;CONSTANT
DESCRIPTORS: HEADACHE
DESCRIPTORS: HEADACHE
DESCRIPTORS: ACHING;CONSTANT;DULL

## 2021-01-01 ASSESSMENT — PAIN DESCRIPTION - ONSET
ONSET: AWAKENED FROM SLEEP
ONSET: ON-GOING

## 2021-01-01 ASSESSMENT — PAIN DESCRIPTION - ORIENTATION
ORIENTATION: LEFT
ORIENTATION: RIGHT
ORIENTATION: RIGHT
ORIENTATION: LEFT

## 2021-01-01 ASSESSMENT — PAIN DESCRIPTION - FREQUENCY
FREQUENCY: INTERMITTENT
FREQUENCY: INTERMITTENT
FREQUENCY: CONTINUOUS

## 2021-01-01 ASSESSMENT — PAIN DESCRIPTION - PROGRESSION: CLINICAL_PROGRESSION: GRADUALLY IMPROVING

## 2021-01-01 ASSESSMENT — PAIN DESCRIPTION - DIRECTION: RADIATING_TOWARDS: CHEST

## 2021-01-13 NOTE — TELEPHONE ENCOUNTER
Please send medical records, physician notes, and x-ray reports to Dr. Nicolle Rolon at [facility]. Fax Number [phone]

## 2021-01-14 NOTE — PROGRESS NOTES
Patient here for labs and B 12 injection and Aranesp. No complaints today. Vitals obtained. Labs reviewed. Hemoglobin=8.6. Aranesp given per [facility], band aid to site. B 12 given per [facility], band aid to site. Has a return visit scheduled. Discharged ambulatory with family.

## 2021-01-18 NOTE — ED PROVIDER NOTES
[address] [place] [address] ED  EMERGENCY DEPARTMENT ENCOUNTER      Pt Name: Tova Mercado  MRN: [id number]  Hagfyamileth 1945  Date of evaluation: 1/18/2021  Provider: Ruben Lester MD    CHIEF COMPLAINT       Chief Complaint   Patient presents with    Spasms         HISTORY OF PRESENT ILLNESS  (Location/Symptom, Timing/Onset, Context/Setting, Quality, Duration, Modifying Factors, Severity.)   Tova Mercado is a 70 y.o. female who presents to the emergency department for pain and muscle spasms. She apparently has a history of hypocalcemia and she and her family believe that her calcium is low. She is been having the symptoms for the past few days and she is feeling spasms throughout her body. She has particular pain in the right shoulder but there is been no recent trauma to that shoulder. No fever cough chest pain or shortness of breath. No vomiting or diarrhea. She has been taking all of her medications including the calcium supplement. The pain is intermittent and she rates it as a 10. Nursing Notes were reviewed.     ALLERGIES     Dye [barium-containing compounds], Pcn [penicillins], and Bactrim [sulfamethoxazole-trimethoprim]    CURRENT MEDICATIONS       Previous Medications    ALBUTEROL (PROVENTIL) (2.5 MG/3ML) 0.083% NEBULIZER SOLUTION    Take 3 mLs by nebulization 4 times daily    AMIODARONE (CORDARONE) 200 MG TABLET    Take 200 mg by mouth daily     APIXABAN (ELIQUIS) 2.5 MG TABS TABLET    Take 1 tablet by mouth 2 times daily    ATORVASTATIN (LIPITOR) 20 MG TABLET    TAKE 1 TABLET DAILY    CALCITRIOL (ROCALTROL) 0.25 MCG CAPSULE    Take 0.25 mcg by mouth 2 times daily     CALCIUM CARB-CHOLECALCIFEROL (OYSTER SHELL CALCIUM/VITAMIN D) 250-125 MG-UNIT PER TABLET    Take 2 tablets by mouth daily    CALCIUM-VITAMIN D-VITAMIN K 650-12.5-40 MG-MCG-MCG CHEW    Take 1 tablet by mouth daily as needed (low calcium intake) CARBIDOPA-LEVODOPA (SINEMET CR)  MG PER EXTENDED RELEASE TABLET    Take 1 tablet by mouth 4 times daily    CYANOCOBALAMIN (B-12) 5000 MCG CAPS    Take 5,000 mcg by mouth 2 times daily     FERROUS SULFATE 325 (65 FE) MG EC TABLET    Take 1 tablet by mouth 2 times daily (with meals)    FLUTICASONE-VILANTEROL (BREO ELLIPTA) 100-25 MCG/INH AEPB INHALER    Inhale 2 puffs into the lungs daily    FUROSEMIDE (LASIX) 20 MG TABLET    Take 1 tablet by mouth daily    ISOSORBIDE MONONITRATE (IMDUR) 30 MG EXTENDED RELEASE TABLET    Take 1 tablet by mouth daily    LINAGLIPTIN (TRADJENTA) 5 MG TABLET    Take 0.5 tablets by mouth daily    MAGNESIUM OXIDE (MAG-OX) 400 (241.3 MG) MG TABS TABLET    Take 1 tablet by mouth 2 times daily    MELATONIN 5 MG TABS TABLET    Take 5 mg by mouth nightly as needed (sleep)    METOPROLOL TARTRATE (LOPRESSOR) 25 MG TABLET    Take 1 tablet by mouth daily    MICONAZOLE (MICOTIN) 2 % POWDER    Apply topically 2 times daily. ONE TOUCH ULTRA TEST STRIP    USE 1 STRIP THREE TIMES A DAY    ONETOUCH VERIO STRIP    1 each by In Vitro route daily As needed. OXYGEN CONCENTRATOR    Dx: Pneumonia, use as directed.     PANTOPRAZOLE (PROTONIX) 40 MG TABLET    TAKE 1 TABLET TWICE A DAY BEFORE MEALS    QUETIAPINE (SEROQUEL XR) 50 MG EXTENDED RELEASE TABLET    Take 50 mg by mouth nightly    RASAGILINE MESYLATE 1 MG TABS    Take 1 tablet by mouth daily    ROPINIROLE (REQUIP) 2 MG TABLET    Take 1 tablet by mouth 3 times daily    SENNA (SENOKOT) 8.6 MG TABLET    Take 1-2 tablets by mouth nightly     TIOTROPIUM (SPIRIVA) 18 MCG INHALATION CAPSULE    Inhale 18 mcg into the lungs daily       PAST MEDICAL HISTORY         Diagnosis Date    Acute on chronic diastolic congestive heart failure (HCC)     Anesthesia complication     DIFFICULTY WAKING OP    Asthma     Atrial fibrillation ([address] 2013    Cataracts, bilateral     Cellulitis     BILAT LOWER LEGS-PT STATES IS IMPROVING  Cerebral artery occlusion with cerebral infarction [facility])     Last stroke was February 2017 w/no deficits-TOTAL OF 3    CHF (congestive heart failure) (HCC)     Chronic kidney disease 2013    NOT ON DIALYSIS YET    Chronic kidney disease     Closed fracture of proximal end of right humerus with routine healing     Constipation     CHRONIC    Controlled type 2 diabetes mellitus with stage 3 chronic kidney disease, without long-term current use of insulin (HCC)     COPD (chronic obstructive pulmonary disease) ([address] 2008    PT. SEES DR. BECK. Home O2 at 2-3L/NC 24/7.     Difficult intravenous access     VEINS ROLL    Difficulty walking     AMBULATES WITH THERAPPY    Diverticulosis     DM2 (diabetes mellitus, type 2) ([address] 2008    Full dentures     FULL UPPER ONLY    GERD (gastroesophageal reflux disease) 2008    ON RX    H/O fall     Headache(784.0)     [place] (hard of hearing)     HAS HEARING AIDS BUT DOES NOT WEAR    Hyperlipidemia     Hyperplastic polyp of intestine     Hypertension 2008    Impaired ambulation     USES TRANFER CHAIR WALKER OR CANE    Kidney stone 2018    PRESENTLY-SMALL    Living in nursing home     [address]    Morbid obesity with BMI of 40.0-44.9, adult ([address] 12/30/2016    Muscle weakness     Nephrolithiasis 3/8/2018    Open wound     COCCYX    ECTOR on CPAP 2008    USES C-PAP NIGHTLY    Osteoarthritis     OSTEOARTHRITIS    Parkinson disease ([address] 2015    Restless leg syndrome 2013    MILD    Spinal stenosis in cervical region 6/2/2013    Type II or unspecified type diabetes mellitus without mention of complication, not stated as uncontrolled     Urethral caruncle 3/8/2018    Wears glasses     Wears glasses        SURGICAL HISTORY           Procedure Laterality Date    APPENDECTOMY      CERVICAL [address] SURGERY  2012    CERVICAL SPINE SURGERY  5/31/13    posterior [id number]    COLONOSCOPY  2009  COLONOSCOPY  2016    incomplete was not cleaned out    COLONOSCOPY  2016    COLONOSCOPY  2017     SIGMOID COLON POLYPECTOMY: [facility] ,   DIVERTICULOSIS    CYSTORRHAPHY  2018    CYSTOSCOPY N/A 10/28/2020    CYSTOSCOPY performed by Jabier Daugherty MD at [address] [place] [address] 10/30/2020    CYSTOSCOPY PYELOGRAM WITH HOLMIUM LASER RIGHT STENT INSERTION performed by Jabier Daugherty MD at [address] Bilateral 2017    CATARACTS W/ IOL    HARDWARE REMOVAL Right 2019    HARDWARE REMOVAL PINS  HUMERUS     HARDWARE REMOVAL Right 2019    HARDWARE REMOVAL PINS  HUMERUS  C-ARM performed by David Rodrigues MD at [address]  2013    Dr. Lamar Dyer DX W/CELL [facility] N/A 2018    BRONCHOSCOPY performed by Obed Allen MD at [address] N/A 2017    COLONOSCOPY POLYPECTOMY HOT BIOPSY performed by Papi Church MD at [facility] N/A 2018    CYSTOSCOPY performed by Toño Dick MD at [address] Right 2019    SHOULDER CLOSED REDUCTION PERCUTANEOUS PINNING RIGHT performed by David Rodrigues MD at [address] ENDOSCOPY  2016    gastritis, esophagitis,     UPPER GASTROINTESTINAL ENDOSCOPY N/A 2018    EGD BIOPSY performed by Papi Church MD at [place] [address]           Problem Relation Age of Onset    Heart Failure Mother     Hypertension Mother     Heart Disease Mother     High Blood Pressure Mother      Family Status   Relation Name Status    Mother      Father      MGM      MGF      [facility]      PGF          SOCIAL HISTORY reports that she quit smoking about 50 years ago. Her smoking use included cigarettes. She has a 30.00 pack-year smoking history. She has never used smokeless tobacco. She reports previous alcohol use. She reports that she does not use drugs. REVIEW OF SYSTEMS    (2-9 systems for level 4, 10 or more for level 5)     Review of Systems   Constitutional: Negative for chills, fatigue and fever. HENT: Negative for congestion, ear discharge and facial swelling. Eyes: Negative for discharge and redness. Respiratory: Negative for cough and shortness of breath. Cardiovascular: Negative for chest pain. Gastrointestinal: Negative for abdominal pain, constipation, diarrhea and vomiting. Genitourinary: Negative for dysuria and hematuria. Musculoskeletal: Positive for arthralgias and myalgias. Skin: Negative for color change and rash. Neurological: Negative for syncope, numbness and headaches. Hematological: Negative for adenopathy. Psychiatric/Behavioral: Negative for confusion. The patient is not nervous/anxious. Except as noted above the remainder of the review of systems was reviewed and negative. PHYSICAL EXAM    (up to 7 for level 4, 8 or more for level 5)     Vitals:    01/18/21 0849   BP: 130/61   Pulse: 63   Resp: 18   Temp: 98.2 °F (36.8 °C)   TempSrc: Oral   SpO2: 97%   Weight: 173 lb (78.5 kg)   Height: 5' 1\" (1.549 m)       Physical Exam  Vitals signs reviewed. Constitutional:       General: She is not in acute distress. Appearance: She is well-developed. She is not diaphoretic. HENT:      Head: Normocephalic and atraumatic. Eyes:      General: No scleral icterus. Right eye: No discharge. Left eye: No discharge. Neck:      Musculoskeletal: Neck supple. Cardiovascular:      Rate and Rhythm: Normal rate and regular rhythm. Pulmonary:      Effort: Pulmonary effort is normal. No respiratory distress. Breath sounds: Normal breath sounds. No stridor. No wheezing or rales. Abdominal:      General: There is no distension. Palpations: Abdomen is soft. Tenderness: There is no abdominal tenderness. Musculoskeletal: Normal range of motion. Lymphadenopathy:      Cervical: No cervical adenopathy. Skin:     General: Skin is warm and dry. Findings: No erythema or rash. Neurological:      Mental Status: She is alert. Comments: Awake and alert   Psychiatric:         Behavior: Behavior normal.             DIAGNOSTIC RESULTS     EKG: All EKG's are interpreted by the Emergency Department Physician who either signs or Co-signs this chart in the absence of a cardiologist.    RADIOLOGY:   Non-plain film images such as CT, Ultrasound and MRI are read by the radiologist. [facility] radiographic images are visualized and preliminarily interpreted by the emergency physician with the below findings:    Interpretation per the Radiologist below, if available at the time of this note:    Xr Shoulder Right (min 2 Views)    Result Date: 1/18/2021  EXAMINATION: THREE XRAY VIEWS OF THE RIGHT SHOULDER 1/18/2021 9:37 am COMPARISON: 12/08/2020 HISTORY: ORDERING SYSTEM PROVIDED HISTORY: Atraumatic pain TECHNOLOGIST PROVIDED HISTORY: Atraumatic pain Reason for Exam: Pain in right shoulder with movement, best films possible Acuity: Unknown Type of Exam: Unknown FINDINGS: Chronic posttraumatic deformity of the right humeral head is again noted, not significantly changed. There is no acute fracture identified. There is no dislocation. The humeral head is high-riding. Severe degenerative changes of the right acromioclavicular joint are noted. Mild osteoarthritis of the right glenohumeral joint is noted. 1. Remote posttraumatic deformity of the right humeral head. 2. Mild secondary osteoarthritis of the right glenohumeral joint. 3. Severe degenerative changes of the acromioclavicular joint. 4. High-riding right humeral head suggesting a chronic right rotator cuff tear. LABS:  Labs Reviewed   CBC WITH AUTO DIFFERENTIAL - Abnormal; Notable for the following components:       Result Value    RBC 2.86 (*)     Hemoglobin 9.3 (*)     Hematocrit 32.1 (*)     .2 (*)     RDW 15.1 (*)     Platelets 064 (*)     Seg Neutrophils 70 (*)     Lymphocytes 17 (*)     Monocytes 10 (*)     Immature Granulocytes 1 (*)     All other components within normal limits   BASIC METABOLIC PANEL - Abnormal; Notable for the following components:    Glucose 103 (*)     BUN 31 (*)     CREATININE 1.96 (*)     Sodium 145 (*)     CO2 38 (*)     Anion Gap 8 (*)     GFR Non- 25 (*)     GFR  30 (*)     All other components within normal limits       All other labs were within normal range or not returned as of this dictation. EMERGENCY DEPARTMENT COURSE and DIFFERENTIAL DIAGNOSIS/MDM:   Vitals:    Vitals:    01/18/21 0849   BP: 130/61   Pulse: 63   Resp: 18   Temp: 98.2 °F (36.8 °C)   TempSrc: Oral   SpO2: 97%   Weight: 173 lb (78.5 kg)   Height: 5' 1\" (1.549 m)       Orders Placed This Encounter   Medications    morphine sulfate (PF) injection 4 mg    orphenadrine (NORFLEX) injection 60 mg    HYDROcodone-acetaminophen (NORCO) 5-325 MG per tablet     Sig: Take 1 tablet by mouth every 6 hours as needed for Pain for up to 5 days. Dispense:  20 tablet     Refill:  0       Medical Decision Making: Calcium is 9.3, normal.  She was given IV morphine and Norflex and she feels improved and is able to be discharged home. Treatment diagnosis and follow-up were discussed with the patient and her family. CONSULTS:  None    PROCEDURES:  None    FINAL IMPRESSION      1.  Chronic right shoulder pain 2. Spasm of muscle          DISPOSITION/PLAN   DISPOSITION Decision To Discharge 01/18/2021 10:35:41 AM      PATIENT REFERRED TO:   Elian Higuera MD  [address] [place] [address]  [facility]  [phone]      As needed    [facility] ED  [address] [facility]  [phone]    If symptoms worsen      DISCHARGE MEDICATIONS:     New Prescriptions    HYDROCODONE-ACETAMINOPHEN (NORCO) 5-325 MG PER TABLET    Take 1 tablet by mouth every 6 hours as needed for Pain for up to 5 days.          (Please note that portions of this note were completed with a voice recognition program.  Efforts were made to edit the dictations but occasionally words are mis-transcribed.)    Salina Dowell MD  Attending Emergency Physician            Salina Dowell MD  01/18/21 1038

## 2021-01-18 NOTE — ED NOTES
Pt presents to ED via wheelchair with unsteady gait transferring to cart c/o of body spasms and pain. Pt states Hx of body spasms and states \"elevated or low Calcium. \" Pt states she takes a calcium supplement at home. Pt states spasms started a couple days ago. Pt also states increased pain to right shoulder and arm. Pt state previous injury and surgery to right shoulder, but denies new injury to area. No bruising, swelling, or deformity noted. Pt denies chest pain or SOB. Pt states some nausea, but denies v/d/c. Pt denies any urinary symptoms. Pt is A&Ox4.       Teofilo Peterson RN  01/18/21 7985

## 2021-02-04 NOTE — PROGRESS NOTES
Patient here for labs, Aranesp and B 12 injections. No complaints today. Vitals obtained. Labs reviewed. Hemoglobin =9.1. Aranesp given per [facility], band aid to site. B 12 injection given per MAR, band aid to site. Has a return visit scheduled. Discharged ambulatory per self.

## 2021-02-25 NOTE — PLAN OF CARE
Problem: Safety:  Goal: Free from accidental physical injury  Description: Free from accidental physical injury  2/25/2021 1653 by Pete Tabor RN  Outcome: Completed  2/25/2021 1653 by Pete Tabor RN  Outcome: Met This Shift

## 2021-03-05 PROBLEM — R41.82 ALTERED MENTAL STATE: Status: ACTIVE | Noted: 2021-01-01

## 2021-03-05 NOTE — CARE COORDINATION
Social work:  [facility] AT [facility] vs SNF at discharge. Referral to [facility] to follow. Will need PASSAR or HENS.

## 2021-03-05 NOTE — CARE COORDINATION
Case Management Initial Discharge Plan  Kemal Medina,         Readmission Risk              Risk of Unplanned Readmission:        0             Met with:patient or spouse/SO to discuss discharge plans. Information verified: address, contacts, phone number, , insurance Yes  PCP: Heide Kolb MD  Date of last visit: 3/3/21    Insurance Provider: Medicare and nursing homes    Discharge Planning  Current Residence:   house  Living Arrangements:   spouse   Home has 1 stories/2-3 stairs to climb  Support Systems:   spouse  Current Services PTA:   [facility]  Patient able to perform ADL's:Assisted  DME used to aid ambulation prior to admission: 2ww, wheelchair, cane, cpap, nebulizer, and 2L O2 via [address] [place] [address]  During admission: 2ww    Potential Assistance Needed:   resumption of [address] home care    Pharmacy: [facility]   Potential Assistance Purchasing Medications:   no  Does patient want to participate in local refill/ meds to beds program?   no    Patient agreeable to home care: Yes  Freedom of choice provided:  yes      Type of Home Care Services:     Patient expects to be discharged to:   home vs snf    Prior SNF/Rehab Placement and Facility: yes, [facility] to SNF/Rehab: Yes  [place] of choice provided: yes   Evaluation: yes    Expected Discharge date: Follow Up Appointment: Best Day/ Time:      Transportation provider: spouse vs [facility]  Transportation arrangements needed for discharge: tbd    Discharge Plan:   Patient lives at home with spouse in 1 story home with 2-3 steps to enter. Patient's spouse assists with all adl's. Patient uses dme for ambulation and uses 2L O2 via [facility]. Patient goes to [address] [place] [address] for injections every 3 weeks, due in 2 1/2 weeks-Dr. Rivera Mendez. Dr. Duran Cole prescribes Eliquis for A-Fib. Patient had [address] prior to admission.  Patient is

## 2021-03-05 NOTE — ED PROVIDER NOTES
EMERGENCY DEPARTMENT ENCOUNTER    Pt Name: John Su  MRN: [id number]  [place] 1945  Date of evaluation: 3/5/21  CHIEF COMPLAINT       Chief Complaint   Patient presents with    Fatigue    Altered Mental Status    Hypotension     HISTORY OF PRESENT ILLNESS   Patient is a 68-year-old female multiple comorbidities including diabetes CKD A. fib on Eliquis here feeling generally unwell and having generalized weakness and difficulty ambulating. She reportedly stood up off the couch and felt very weak. She states she fell yesterday but did not injure herself. She states she has been on antibiotics for few days for UTI but is unsure if it is helping. States she has had a headache and some generalized discomfort. No chest pain shortness of breath or cough or fevers. No vomiting or diarrhea dark or bloody stools. No focal weakness numbness tingling. REVIEW OF SYSTEMS     Review of Systems   Constitutional: Positive for activity change, appetite change and fatigue. Negative for chills and fever. HENT: Negative for trouble swallowing. Eyes: Negative for visual disturbance. Respiratory: Negative for shortness of breath. Cardiovascular: Negative for chest pain. Gastrointestinal: Negative for abdominal pain and vomiting. Genitourinary: Positive for dysuria. Negative for difficulty urinating. Musculoskeletal: Negative for neck pain. Skin: Negative for rash. Neurological: Positive for weakness, light-headedness and headaches. Psychiatric/Behavioral: Negative for confusion.      PASTMEDICAL HISTORY     Past Medical History:   Diagnosis Date    Acute on chronic diastolic congestive heart failure ([address]     Anesthesia complication     DIFFICULTY WAKING OP    Asthma     Atrial fibrillation ([address] 2013    Cataracts, bilateral     Cellulitis     BILAT LOWER LEGS-PT STATES IS IMPROVING    Cerebral artery occlusion with cerebral infarction [facility])     Last stroke was February 2017 w/no deficits-TOTAL OF 3    CHF (congestive heart failure) ([facility])     Chronic kidney disease 2013    NOT ON DIALYSIS YET    Chronic kidney disease     Closed fracture of proximal end of right humerus with routine healing     Constipation     CHRONIC    Controlled type 2 diabetes mellitus with stage 3 chronic kidney disease, without long-term current use of insulin ([facility])     COPD (chronic obstructive pulmonary disease) ([facility] 2008    PT. SEES DR. BECK. Home O2 at 2-3L/NC 24/7.     Difficult intravenous access     VEINS ROLL    Difficulty walking     AMBULATES WITH THERAPPY    Diverticulosis     DM2 (diabetes mellitus, type 2) ([address] 2008    Full dentures     FULL UPPER ONLY    GERD (gastroesophageal reflux disease) 2008    ON RX    H/O fall     Headache(784.0)     [place] (hard of hearing)     HAS HEARING AIDS BUT DOES NOT WEAR    Hyperlipidemia     Hyperplastic polyp of intestine     Hypertension 2008    Impaired ambulation     USES TRANFER CHAIR WALKER OR CANE    Kidney stone 2018    PRESENTLY-SMALL    Living in nursing home     [address]    Morbid obesity with BMI of 40.0-44.9, adult ([facility] 12/30/2016    Muscle weakness     Nephrolithiasis 3/8/2018    Open wound     COCCYX    ECTOR on CPAP 2008    USES C-PAP NIGHTLY    Osteoarthritis     OSTEOARTHRITIS    Parkinson disease ([facility] 2015    Restless leg syndrome 2013    MILD    Spinal stenosis in cervical region 6/2/2013    Type II or unspecified type diabetes mellitus without mention of complication, not stated as uncontrolled     Urethral caruncle 3/8/2018    Wears glasses     Wears glasses      SURGICAL HISTORY       Past Surgical History:   Procedure Laterality Date    APPENDECTOMY      CERVICAL [address] SURGERY  2012    CERVICAL SPINE SURGERY  5/31/13    posterior [id number]    COLONOSCOPY  2009    COLONOSCOPY  12/29/2016    incomplete was not cleaned out    COLONOSCOPY  12/30/2016    COLONOSCOPY 04/25/2017     SIGMOID COLON POLYPECTOMY:  HYPERPLASTIC POLYP ,   DIVERTICULOSIS    CYSTORRHAPHY  04/04/2018    CYSTOSCOPY N/A 10/28/2020    CYSTOSCOPY performed by Nithin Nava MD at [address] [place] [address] 10/30/2020    CYSTOSCOPY PYELOGRAM WITH HOLMIUM LASER RIGHT STENT INSERTION performed by Nithin Nava MD at [address] Bilateral 2017    CATARACTS W/ IOL    HARDWARE REMOVAL Right 07/05/2019    HARDWARE REMOVAL PINS  HUMERUS     HARDWARE REMOVAL Right 7/5/2019    HARDWARE REMOVAL PINS  HUMERUS  C-ARM performed by Sea Powell MD at [id number] [place] [address]  05/31/2013    Dr. [place] DX W/CELL [address] [facility] N/A 6/5/2018    BRONCHOSCOPY performed by Julien Fox MD at [facility] FLX W/REMOVAL LESION BY HOT BX FORCEPS N/A 4/25/2017    COLONOSCOPY POLYPECTOMY HOT BIOPSY performed by Vargas Botello MD at [address] [facility] 4/4/2018    CYSTOSCOPY performed by Brynn Narayan MD at [address] Right 5/28/2019    SHOULDER CLOSED REDUCTION PERCUTANEOUS PINNING RIGHT performed by Sea Powell MD at [address] [facility]  12/28/2016    gastritis, esophagitis,     UPPER GASTROINTESTINAL ENDOSCOPY N/A 8/29/2018    EGD BIOPSY performed by Vargas Botello MD at [address] [facility]       Previous Medications    ALBUTEROL SULFATE HFA (PROAIR HFA) 108 (90 BASE) MCG/ACT INHALER    Inhale 2 puffs into the lungs every 6 hours as needed for Wheezing    AMIODARONE (CORDARONE) 200 MG TABLET    Take 200 mg by mouth daily     APIXABAN (ELIQUIS) 2.5 MG TABS TABLET    Take 1 tablet by mouth 2 times daily    ATORVASTATIN (LIPITOR) 20 MG TABLET    TAKE 1 TABLET DAILY    CALCITRIOL (ROCALTROL) 0.25 MCG CAPSULE    Take 0.25 mcg by mouth 2 times daily     CALCIUM CARB-CHOLECALCIFEROL (OYSTER SHELL CALCIUM/VITAMIN D) 250-125 MG-UNIT PER TABLET    Take 2 tablets by mouth daily    CALCIUM-VITAMIN D-VITAMIN K 650-12.5-40 MG-MCG-MCG CHEW    Take 1 tablet by mouth daily as needed (low calcium intake)    CARBIDOPA-LEVODOPA (SINEMET CR)  MG PER EXTENDED RELEASE TABLET    Take 1 tablet by mouth 4 times daily    CYANOCOBALAMIN (B-12) 5000 MCG CAPS    Take 5,000 mcg by mouth 2 times daily     FAMOTIDINE (PEPCID) 40 MG TABLET    Take 40 mg by mouth nightly    FERROUS SULFATE 325 (65 FE) MG EC TABLET    Take 1 tablet by mouth 2 times daily (with meals)    FLUTICASONE-VILANTEROL (BREO ELLIPTA) 100-25 MCG/INH AEPB INHALER    Inhale 2 puffs into the lungs daily    FUROSEMIDE (LASIX) 40 MG TABLET    Take 40 mg by mouth daily    GABAPENTIN (NEURONTIN) 100 MG CAPSULE    Take 200 mg by mouth 3 times daily. ISOSORBIDE MONONITRATE (IMDUR) 30 MG EXTENDED RELEASE TABLET    Take 1 tablet by mouth daily    LINAGLIPTIN (TRADJENTA) 5 MG TABLET    Take 5 mg by mouth daily    MAGNESIUM OXIDE (MAG-OX) 400 (241.3 MG) MG TABS TABLET    Take 1 tablet by mouth 2 times daily    MELATONIN 5 MG TABS TABLET    Take 5 mg by mouth nightly as needed (sleep)    METOPROLOL TARTRATE (LOPRESSOR) 25 MG TABLET    Take 12.5 mg by mouth 2 times daily 1/2 tab (=12.5mg) BID    MODAFINIL (PROVIGIL) 200 MG TABLET    Take 200 mg by mouth daily. MONTELUKAST (SINGULAIR) 10 MG TABLET    Take 10 mg by mouth nightly    ONE TOUCH ULTRA TEST STRIP    USE 1 STRIP THREE TIMES A DAY    ONETOUCH VERIO STRIP    1 each by In Vitro route daily As needed. OXYGEN CONCENTRATOR    Dx: Pneumonia, use as directed.     PANTOPRAZOLE (PROTONIX) 40 MG TABLET    Take 40 mg by mouth daily    RASAGILINE MESYLATE 1 MG TABS    Take 1 tablet by mouth daily    ROPINIROLE (REQUIP) 2 MG TABLET    Take 1 tablet by mouth 3 times daily    SENNA (SENOKOT) 8.6 MG TABLET    Take 1-2 tablets by mouth nightly     TIOTROPIUM (SPIRIVA) 18 MCG INHALATION CAPSULE    Inhale 18 mcg into the lungs daily ALLERGIES     is allergic to dye [barium-containing compounds]; pcn [penicillins]; and bactrim [sulfamethoxazole-trimethoprim]. FAMILY HISTORY     She indicated that her mother is . She indicated that her father is . She indicated that her maternal grandmother is . She indicated that her maternal grandfather is . She indicated that her paternal grandmother is . She indicated that her paternal grandfather is . SOCIAL HISTORY       Social History     Tobacco Use    Smoking status: Former Smoker     Packs/day: 2.00     Years: 15.00     Pack years: 30.00     Types: Cigarettes     Quit date: 10/31/1970     Years since quittin.3    Smokeless tobacco: Never Used   Substance Use Topics    Alcohol use: Not Currently    Drug use: No     Comment: Pt denies use. PHYSICAL EXAM     INITIAL VITALS: BP (!) 129/48   Pulse 54   Temp 98.2 °F (36.8 °C) (Oral)   Resp 12   LMP 2004 (Within Years)   SpO2 100%    Physical Exam  Vitals signs and nursing note reviewed. Constitutional:       General: She is not in acute distress. Appearance: She is ill-appearing. She is not toxic-appearing or diaphoretic. HENT:      Head: Normocephalic and atraumatic. Mouth/Throat:      Mouth: Mucous membranes are dry. Pharynx: Oropharynx is clear. Eyes:      Extraocular Movements: Extraocular movements intact. Pupils: Pupils are equal, round, and reactive to light. Neck:      Musculoskeletal: Normal range of motion and neck supple. No neck rigidity. Cardiovascular:      Rate and Rhythm: Normal rate and regular rhythm. Pulmonary:      Effort: Pulmonary effort is normal. No respiratory distress. Comments: On 2 L nasal cannula home oxygen  Abdominal:      General: There is no distension. Palpations: Abdomen is soft. There is no mass. Tenderness: There is no abdominal tenderness. There is no guarding.       Hernia: A hernia (ventral) is present. Musculoskeletal: Normal range of motion. General: No deformity. Right lower leg: Edema present. Left lower leg: Edema present. Skin:     General: Skin is warm and dry. Capillary Refill: Capillary refill takes less than 2 seconds. Findings: No rash. Neurological:      General: No focal deficit present. Mental Status: She is alert and oriented to person, place, and time. Cranial Nerves: No cranial nerve deficit. Psychiatric:         Thought Content: Thought content normal.         MEDICAL DECISION MAKING:          Please see ED Course below for MDM/ED course. DDx: Infection, stroke, arrhythmia, electrolyte imbalance    All patient's question's and concerns were answered prior to disposition and patient and/or family expressed understanding and agreement of treatment plan. NIH STROKE SCALE:            PROCEDURES:    Procedures    DIAGNOSTIC RESULTS   EKG:All EKG's are interpreted by the Emergency Department Physician who either signs or Co-signs this chart in the absence of a cardiologist.    Sinus bradycardia with sinus arrhythmia rate of 54 AV block first-degree  QRS is 88  no ST elevations or depressions there is some motion artifact no T wave changes, no Q waves, nonspecific EKG    RADIOLOGY:All plain film, CT, MRI, and formal ultrasound images (except ED bedside ultrasound) are read by the radiologist, see reports below, unless otherwisenoted in MDM or here. CT HEAD WO CONTRAST   Final Result   No acute intracranial abnormality. No interval change. Findings compatible with chronic small vessel ischemic disease. Additional   unchanged findings, as above. XR CHEST 1 VIEW   Final Result   Chronic findings in the chest without acute airspace disease identified. LABS: All lab results were reviewed by myself, and all abnormals are listed below.   Labs Reviewed   CBC WITH AUTO DIFFERENTIAL - Abnormal; Notable for the following components:       Result Value    RBC 2.57 (*)     Hemoglobin 8.8 (*)     Hematocrit 30.3 (*)     .9 (*)     MCH 34.2 (*)     RDW 14.6 (*)     Platelets 522 (*)     Seg Neutrophils 71 (*)     Lymphocytes 19 (*)     Immature Granulocytes 1 (*)     Absolute Lymph # 0.95 (*)     All other components within normal limits   COMPREHENSIVE METABOLIC PANEL W/ REFLEX TO MG FOR LOW K - Abnormal; Notable for the following components:    Glucose 107 (*)     BUN 43 (*)     CREATININE 2.03 (*)     Bun/Cre Ratio 21 (*)     CO2 33 (*)     Anion Gap 8 (*)     ALT <5 (*)     Total Bilirubin 0.13 (*)     Total Protein 6.2 (*)     GFR Non- 24 (*)     GFR  29 (*)     All other components within normal limits   TROP/MYOGLOBIN - Abnormal; Notable for the following components:    Troponin, High Sensitivity 43 (*)     Myoglobin 100 (*)     All other components within normal limits   LACTATE, SEPSIS - Abnormal; Notable for the following components:    Lactic Acid, Sepsis 0.4 (*)     All other components within normal limits   URINALYSIS WITH MICROSCOPIC - Abnormal; Notable for the following components:    Urine Hgb 2+ (*)     Crystals, UA 5 TO 10 CALCIUM OXALATE (*)     All other components within normal limits   COVID-19, RAPID   CULTURE, BLOOD 1   CULTURE, BLOOD 1   CULTURE, URINE   PROTIME-INR   APTT   TROP/MYOGLOBIN   LACTATE, SEPSIS       EMERGENCY DEPARTMENTCOURSE:     Patient is a 29-year-old female with multiple comorbidities here with generalized fatigue weakness malaise body aches and being treated for UTI outpatient. She felt very weak and had a hard time ambulating today and she is not been acting herself per the . She is on her 2 L nasal cannula that she is on at home. She is afebrile she is 95% on this. She was reportedly hypotensive per EMS. She is speaking in full sentences she her tongue does appear dry. There is no signs of trauma.   She is moving all extremities well.  Will check broad infectious metabolic cardiac work-up as well as CT head chest x-ray and cath urinalysis and start IV fluids. No evidence of UTI on urinalysis. Negative Covid chest x-ray and CT head unremarkable. No significant electrolyte imbalance. Mildly elevated troponin compared to baseline, will trend. Not complaining of chest pain. EKG nonspecific. Spoke with Dr. Amy Ashford who will admit the patient for altered mental status, trending troponins, requesting neurology evaluation Dr. Bony Lemon. I did place admission orders.  requesting patient's Parkinson's medications. He also states patient was recently started on gabapentin and the dose was recently increased and seems to be helping with her discomfort but he is curious if this may be causing her mental status change. Vitals:    Vitals:    03/05/21 1330 03/05/21 1345 03/05/21 1400 03/05/21 1404   BP:       Pulse: 55 52 57 54   Resp: 11 10 9 12   Temp:       TempSrc:       SpO2: 100% 100% 100% 100%       The patient was given the following medications while in the emergency department:  Orders Placed This Encounter   Medications    0.9 % sodium chloride infusion    rOPINIRole (REQUIP) tablet 2 mg    carbidopa-levodopa (SINEMET CR)  MG per extended release tablet 1 tablet     CONSULTS:  IP CONSULT TO HOSPITALIST  IP CONSULT TO NEUROLOGY    FINAL IMPRESSION      1. Altered mental status, unspecified altered mental status type    2. Elevated troponin          DISPOSITION/PLAN   DISPOSITION Admitted 03/05/2021 02:00:58 PM      PATIENT REFERRED TO:  No follow-up provider specified.   DISCHARGE MEDICATIONS:  New Prescriptions    No medications on file     Enoch Rodriguez MD  Attending Emergency Physician    This note was created with the assistance of a speech-recognition program. While intending to generate a document that actually reflects the content of the visit, no guarantees can be provided that every mistake has been identified and corrected by editing.                     Enoch Rodriguez MD  03/05/21 6013

## 2021-03-05 NOTE — PLAN OF CARE
Problem: Falls - Risk of:  Goal: Will remain free from falls  Description: Will remain free from falls  Outcome: Ongoing  Goal: Absence of physical injury  Description: Absence of physical injury  Outcome: Ongoing     Problem: Skin Integrity:  Goal: Will show no infection signs and symptoms  Description: Will show no infection signs and symptoms  Outcome: Ongoing  Goal: Absence of new skin breakdown  Description: Absence of new skin breakdown  Outcome: Ongoing     Problem: Discharge Planning:  Goal: Discharged to appropriate level of care  Description: Discharged to appropriate level of care  Outcome: Ongoing     Problem: Serum Glucose Level - Abnormal:  Goal: Ability to maintain appropriate glucose levels will improve  Description: Ability to maintain appropriate glucose levels will improve  Outcome: Ongoing     Problem: Sensory Perception - Impaired:  Goal: Ability to maintain a stable neurologic state will improve  Description: Ability to maintain a stable neurologic state will improve  Outcome: Ongoing     Problem: Mental Status - Impaired:  Goal: Mental status will improve  Description: Mental status will improve  Outcome: Ongoing     Problem:  Activity:  Goal: Ability to tolerate increased activity will improve  Description: Ability to tolerate increased activity will improve  Outcome: Ongoing

## 2021-03-05 NOTE — CARE COORDINATION
Social work: Phone call from [facility], they can accept at discharge if recommendation is SNF. If patient is ready for discharge over the weekend, contact Tia/admissions at [facility] DR RENATO KAMARA at [phone] for Saturday. For Sunday admission contact [facility] cell phone: [phone]. PASSAR done.

## 2021-03-05 NOTE — PROGRESS NOTES
The patient arrived to the room from the ED; the patient was oriented to the room, call light, safety and bed mechanics.

## 2021-03-05 NOTE — PROGRESS NOTES
Transitions of Care Pharmacy Service   Medication Review    The patient's list of current home medications has been reviewed. She is known from previous encounters; her spouse handles her meds at home but he was not in the room whenever I came by. Her med list is correct per PCP office and refill report. Source(s) of information: PCP office (Dr Clifford Castellanos), [employer] refill report, Epic        Please feel free to call me with any questions about this encounter. Thank you. Hilary Muñoz, [facility]   Transitions of Care Pharmacy Service  Phone:  [phone]  Fax: [phone]      Electronically signed by Hilary Muñoz [facility] on 3/5/2021 at 6:35 PM           Medications Prior to Admission:   albuterol sulfate HFA (PROAIR HFA) 108 (90 Base) MCG/ACT inhaler, Inhale 2 puffs into the lungs every 6 hours as needed for Wheezing  furosemide (LASIX) 40 MG tablet, Take 20 mg by mouth daily   linagliptin (TRADJENTA) 5 MG tablet, Take 5 mg by mouth daily  metoprolol tartrate (LOPRESSOR) 25 MG tablet, Take 12.5 mg by mouth 2 times daily 1/2 tab (=12.5mg) BID  pantoprazole (PROTONIX) 40 MG tablet, Take 40 mg by mouth daily  famotidine (PEPCID) 40 MG tablet, Take 40 mg by mouth nightly  gabapentin (NEURONTIN) 100 MG capsule, Take 200 mg by mouth 3 times daily. modafinil (PROVIGIL) 200 MG tablet, Take 200 mg by mouth daily. Indications: 200mg AM and 100mg in afternoon   montelukast (SINGULAIR) 10 MG tablet, Take 10 mg by mouth nightly  albuterol (PROVENTIL) (2.5 MG/3ML) 0.083% nebulizer solution, Take 2.5 mg by nebulization 3 times daily  modafinil (PROVIGIL) 100 MG tablet, Take 100 mg by mouth every evening.  Indications: 200mg AM and 100mg in afternoon  QUEtiapine (SEROQUEL) 50 MG tablet, Take 50 mg by mouth nightly  apixaban (ELIQUIS) 2.5 MG TABS tablet, Take 1 tablet by mouth 2 times daily  Cyanocobalamin (B-12) 5000 MCG CAPS, Take 5,000 mcg by mouth 2 times daily   tiotropium (SPIRIVA) 18 MCG inhalation capsule, Inhale 18 mcg into the lungs daily  isosorbide mononitrate (IMDUR) 30 MG extended release tablet, Take 1 tablet by mouth daily  fluticasone-vilanterol (BREO ELLIPTA) 100-25 MCG/INH AEPB inhaler, Inhale 2 puffs into the lungs daily  melatonin 5 MG TABS tablet, Take 5 mg by mouth nightly as needed (sleep)  calcitRIOL (ROCALTROL) 0.25 MCG capsule, Take 0.25 mcg by mouth 2 times daily   atorvastatin (LIPITOR) 20 MG tablet, TAKE 1 TABLET DAILY  carbidopa-levodopa (SINEMET CR)  MG per extended release tablet, Take 1 tablet by mouth 4 times daily  rOPINIRole (REQUIP) 2 MG tablet, Take 1 tablet by mouth 3 times daily  amiodarone (CORDARONE) 200 MG tablet, Take 200 mg by mouth daily   ferrous sulfate 325 (65 Fe) MG EC tablet, Take 1 tablet by mouth 2 times daily (with meals)  rasagiline mesylate 1 MG TABS, Take 1 tablet by mouth daily  Oxygen Concentrator, Dx: Pneumonia, use as directed. (Patient taking differently: Dx: Pneumonia, use as directed.    ON 24/7)  Calcium Carb-Cholecalciferol (OYSTER SHELL CALCIUM/VITAMIN D) 250-125 MG-UNIT per tablet, Take 2 tablets by mouth daily  Calcium-Vitamin D-Vitamin K 650-12.5-40 MG-MCG-MCG CHEW, Take 1 tablet by mouth daily as needed (low calcium intake)  magnesium oxide (MAG-OX) 400 (241.3 Mg) MG TABS tablet, Take 1 tablet by mouth 2 times daily (Patient taking differently: Take 400 mg by mouth daily )  senna (SENOKOT) 8.6 MG tablet, Take 1-2 tablets by mouth nightly

## 2021-03-06 PROBLEM — R26.9 GAIT ABNORMALITY: Status: ACTIVE | Noted: 2021-01-01

## 2021-03-06 PROBLEM — R42 POSTURAL DIZZINESS: Status: ACTIVE | Noted: 2021-01-01

## 2021-03-06 PROBLEM — R29.6 FALLING: Status: ACTIVE | Noted: 2021-01-01

## 2021-03-06 NOTE — PROGRESS NOTES
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 with complex medical history and presenting with Rt. UE and LE weakness that spouse is saying is NOT from her CVA x 3 in past but from UE humerus fx one yr. ago and a \"complication with heart cath. \" for Rt. LE. Pt. presents wtih deshawn LEs in [address] Will be a Co-Tx to progress from  [address] Recommending SNF at discharge. Specific instructions for Next Treatment: Co-Tx to progress from  [address] ST[facility]  Prognosis: Good  Decision Making: High Complexity  PT Education: Goals;PT Role;Plan of Care;General Safety  REQUIRES PT FOLLOW UP: Yes  Activity Tolerance  Activity Tolerance: Patient limited by fatigue;Treatment limited secondary to medical complications (free text)       Patient Diagnosis(es): The primary encounter diagnosis was Altered mental status, unspecified altered mental status type. A diagnosis of Elevated troponin was also pertinent to this visit.      has a past medical history of Acute on chronic diastolic congestive heart failure ([address] Anesthesia complication, Asthma, Atrial fibrillation ([address] Cataracts, bilateral, Cellulitis, Cerebral artery occlusion with cerebral infarction [facility]), CHF (congestive heart failure) ([facility] Chronic kidney disease, Chronic kidney disease, Closed fracture of proximal end of right humerus with routine healing, Constipation, Controlled type 2 diabetes mellitus with stage 3 chronic kidney disease, without long-term current use of insulin ([address] COPD (chronic obstructive pulmonary disease) ([facility] Difficult intravenous access, Difficulty walking, Diverticulosis, DM2 (diabetes mellitus, type 2) ([facility] Full dentures, GERD (gastroesophageal reflux disease), H/O fall, Headache(784.0), [place] (hard of hearing), Hyperlipidemia, Hyperplastic polyp of intestine, Hypertension, Impaired ambulation, Kidney stone, Living in nursing home, Morbid obesity with BMI of 40.0-44.9, adult ([address] Muscle weakness, Nephrolithiasis, Open wound, ECTOR on CPAP, Osteoarthritis, Parkinson disease ([facility] Restless leg syndrome, Spinal stenosis in cervical region, Type II or unspecified type diabetes mellitus without mention of complication, not stated as uncontrolled, Urethral caruncle, Wears glasses, and Wears glasses. has a past surgical history that includes Cervical disc surgery (2012); Appendectomy; Tonsillectomy and adenoidectomy (1953); hernia repair (1999); eye surgery (Bilateral, 2017); Cervical spine surgery (5/31/13); laminectomy (05/31/2013); Upper gastrointestinal endoscopy (12/28/2016); Colonoscopy (2009); Colonoscopy (12/29/2016); Colonoscopy (12/30/2016); Colonoscopy (04/25/2017); pr colsc flx w/removal lesion by hot bx forceps (N/A, 4/25/2017); Cystorrhaphy (04/04/2018); pr cystourethroscopy,biopsies (N/A, 4/4/2018); pr [facility] incl fluor gdnce dx w/cell washg spx (N/A, 6/5/2018); Upper gastrointestinal endoscopy (N/A, 8/29/2018); shoulder fracture surgery (Right, 5/28/2019); Hardware Removal (Right, 07/05/2019); Hardware Removal (Right, 7/5/2019); Cystoscopy (N/A, 10/28/2020); and Cystoscopy (N/A, 10/30/2020).     Restrictions  Restrictions/Precautions  Restrictions/Precautions: General Precautions, Fall Risk, Up as Tolerated  Position Activity Restriction  Other position/activity restrictions: fall history  Vision/Hearing  Vision: Impaired  Vision Exceptions: Wears glasses at all times  Hearing: Exceptions to [facility]  Hearing Exceptions: Hard of hearing/hearing concerns     Subjective  General  Chart Reviewed: Yes  Patient assessed for rehabilitation services?: Yes  Additional Pertinent Hx: CHF, CVA x 3, DM, HTN, OA, Parkinsons  Family / Caregiver Present: Yes(Spouse)  Follows Commands: Within Functional Limits          Orientation  Orientation  Overall Orientation Status: Within Functional Limits  Social/Functional History  Social/Functional History  Lives With: Spouse  Type of Home: House  Home Layout: One level  Home Access: Stairs to enter without rails, Ramped entrance  Entrance Stairs - Number of Steps: 2(can do stairs with assist from spouse in last month; prior to that was not needing assist)  Bathroom Shower/Tub: Tub/Shower unit  Bathroom Toilet: Standard  Bathroom Equipment: Grab bars in shower, Shower chair, Grab bars around toilet  Home Equipment: Rolling walker, Cane, Quad cane, 4 wheeled walker, Oxygen(2L O2 24/7; transport chair; lift chair but does not use lift option; sleeps in regular bed with bed rail)  ADL Assistance: Needs assistance(assist for bathing and lower body dressing (spouse allows pt. to attempt and she is sometimes successful with lower body dressing))  Homemaking Responsibilities: No  Ambulation Assistance: Independent(transport chair in community;  uses RW in the home)  Transfer Assistance: Needs assistance(if chair is \"too low\"- difficult lately)  Active : No  Additional Comments: Home health PT/OT from April-Dec 2020 after SNF stay s/p \"pulmonary/kidney issues\" per spouse;  still has home speech therapy due to difficulty speaking/swallowing from Parkinsons (no dietary restrictions.)  Currently in pain management for RUE nerve pain s/p humerus fx \"one yr. ago;  spouse notes a decline gait/stairs/overall weakness in last month  Cognition   Cognition  Overall Cognitive Status: Exceptions  Problem Solving: Assistance required to generate solutions;Assistance required to implement solutions  Initiation: Requires cues for all  Sequencing: Requires cues for all    Objective     Observation/Palpation  Posture: Poor(kyphotic w/ fwd. head)  Observation: weakness/coordination deficit Rt. UE- dropped full water cup in bed (linens/gown changed); fearfull of falling in seated position; bed alarm    AROM RLE (degrees)  RLE AROM: WFL  RLE General AROM: decreased DF bilat. AROM LLE (degrees)  LLE AROM : WFL  AROM RUE (degrees)  RUE General AROM: 20 degrees active shoulder flexion; AAROM with assist from LUE for complete elbow flexion; wrist partial anti-grav.; able to oppose thumb to fingers with mod. effort  Strength RLE  Comment: weakness t/o; weaker than LLE  Strength LLE  Strength LLE: WFL  Strength Other  Other: functional weakness bilat. demonstrated by buckling knees in standing;  lacking coordination Rt. UE/hand  Tone RLE  RLE Tone: Normotonic  Tone LLE  LLE Tone: Normotonic  Sensation  Overall Sensation Status: Impaired(nerve pain Rt. UE s/p humerus fx one yr. ago- very hypersensitive to touch)  Bed mobility  Rolling to Right: Moderate assistance  Supine to Sit: Maximum assistance  Sit to Supine: Maximum assistance  Scooting: Dependent/Total  Transfers  Sit to Stand: Moderate Assistance(NORI MARTINEZ with raised bed)  Stand to sit: Minimal Assistance  Comment: Standing in [address] pt. able to wt. shift and begin to take steps in place, only to have knees \"swathi\" and then buckle each time. Attempted x 3. Unsafe to try with RW unless 2 people present. Pt. with difficulty supporting herself with Rt. UE.   Ambulation  Ambulation?: (unsafe to attempt at this time due to knees buckling)     Balance  Posture: Poor  Sitting - Static: Poor;+;Fair;-(Pt. fearful of falling sitting unsupporting EOB)  Sitting - Dynamic: Poor;-  Standing - Static: Poor        Plan   Plan  Times per week: 1-2x/day; 5-6days/wk  Specific instructions for Next Treatment: Co-Tx to [facility] from [facility]  Current Treatment Recommendations: Strengthening, Transfer Training, ROM, Balance Training, Functional Mobility Training, Gait Training, Safety Education & Training  Safety Devices  Type of devices: All fall risk precautions in place, Gait belt, Bed alarm in place, Patient at risk for falls, Call light within reach, Left in bed, Nurse notified     Trenton Yoder. SLS time indicating fall risk (>5sec)  [facility] completed    Goals  Short term goals  Time Frame for Short term goals: 12 visits:  Short term goal 1: Pt. to require mod A X1 for bed mob., rolling to Rt. and utilizing bedrail with LUE (as pt. is set up at home)  Short term goal 2: Pt. to have good- static sitting balance/  fair- dynamic sitting balance  Short term goal 3: Pt. to require mod A for sit to stand transfer with RW  Short term goal 4: Pt. to transfer bed to chair with RW and mod A  Short term goal 5: Pt. to tolerate 25+ min. of PT for ther ex/ gait/ balance training daily  Patient Goals   Patient goals : ambulate       Therapy Time   Individual Concurrent Group Co-treatment   Time In 1100         Time Out 1159         Minutes 59              Treatment time:  44min.     Chelo Molina, PT

## 2021-03-06 NOTE — H&P
History and Physical/admit note        CHIEF COMPLAINT: Weakness    History of Present Illness: 73-year-old gentleman poor historian with multiple medical problems including atrial fibrillation type 2 diabetes Parkinson's obstructive sleep apnea was recently diagnosed with UTI and has been on  oral Cipro, came to the emergency room with 1 day history of severe fatigue unable to ambulate with apparent weakness on the right side her CT of the brain was unremarkable        Past Medical History:   Diagnosis Date    Acute on chronic diastolic congestive heart failure ([address]     Anesthesia complication     DIFFICULTY WAKING OP    Asthma     Atrial fibrillation ([address] 2013    Cataracts, bilateral     Cellulitis     BILAT LOWER LEGS-PT STATES IS IMPROVING    Cerebral artery occlusion with cerebral infarction [facility])     Last stroke was February 2017 w/no deficits-TOTAL OF 3    CH[facility]congest[facility] failure) ([facility]     Chronic kidney disease 2013    NOT ON DIALYSIS YET    Chronic kidney disease     Closed fracture of proximal end of right humerus with routine healing     Constipation     CHRONIC    Controlled type 2 diabetes mellitus with stage 3 chronic kidney disease, without long-term current use of insulin ([facility])     COPD (chronic obstructive pulmonary disease) ([address] 2008    PT. SEES DR. BECK. Home O2 at 2-3L/NC 24/7.     Difficult intravenous access     VEINS ROLL    Difficulty walking     AMBULATES WITH THERAPPY    Diverticulosis     DM2 (diabetes mellitus, type 2) ([address] 2008    Full dentures     FULL UPPER ONLY    GERD (gastroesophageal reflux disease) 2008    ON RX    H/O fall     Headache(784.0)     [place] (hard of hearing)     HAS HEARING AIDS BUT DOES NOT WEAR    Hyperlipidemia     Hyperplastic polyp of intestine     Hypertension 2008    Impaired ambulation     USES TRANFER CHAIR WALKER OR CANE    Kidney stone 2018    PRESENTLY-SMALL    Living in nursing home     [facility] IS HER OWN LEGAL GUARDIAN    Morbid obesity with BMI of 40.0-44.9, adult ([facility] 12/30/2016    Muscle weakness     Nephrolithiasis 3/8/2018    Open wound     COCCYX    ECTOR on CPAP 2008    USES C-PAP NIGHTLY    Osteoarthritis     OSTEOARTHRITIS    Parkinson disease ([facility] 2015    Restless leg syndrome 2013    MILD    Spinal stenosis in cervical region 6/2/2013    Type II or unspecified type diabetes mellitus without mention of complication, not stated as uncontrolled     Urethral caruncle 3/8/2018    Wears glasses     Wears glasses          Past Surgical History:   Procedure Laterality Date    APPENDECTOMY      CERVICAL [address]  2012    CERVICAL SPINE SURGERY  5/31/13    posterior c5-t1    COLONOSCOPY  2009    COLONOSCOPY  12/29/2016    incomplete was not cleaned out    COLONOSCOPY  12/30/2016    COLONOSCOPY  04/25/2017     SIGMOID COLON POLYPECTOMY:  HYPERPLASTIC POLYP ,   DIVERTICULOSIS    CYSTORRHAPHY  04/04/2018    CYSTOSCOPY N/A 10/28/2020    CYSTOSCOPY performed by Akanksha Jaimes MD at [address] [place] [address] 10/30/2020    CYSTOSCOPY PYELOGRAM WITH HOLMIUM LASER RIGHT STENT INSERTION performed by Akanksha Jaimes MD at [facility] Bilateral 2017    CATARACTS W/ IOL    HARDWARE REMOVAL Right 07/05/2019    HARDWARE REMOVAL PINS  HUMERUS     HARDWARE REMOVAL Right 7/5/2019    HARDWARE REMOVAL PINS  HUMERUS  C-ARM performed by Edis Mondragon MD at [address] [facility]  05/31/2013    Dr. Amado Montano N/A 6/5/2018    BRONCHOSCOPY performed by Farzaneh Ramos MD at [address]  N/A 4/25/2017    COLONOSCOPY POLYPECTOMY HOT BIOPSY performed by Patti Carlton MD at [address] [facility] 4/4/2018    CYSTOSCOPY performed by Junior Roney MD at [address] Right 5/28/2019    SHOULDER CLOSED REDUCTION PERCUTANEOUS PINNING RIGHT performed by Georgette Grossman MD at [place] [address] ENDOSCOPY  12/28/2016    gastritis, esophagitis,     UPPER GASTROINTESTINAL ENDOSCOPY N/A 8/29/2018    EGD BIOPSY performed by Milan Mckeon MD at [address] [place] [address]       Medications Prior to Admission:    Medications Prior to Admission: albuterol sulfate HFA (PROAIR HFA) 108 (90 Base) MCG/ACT inhaler, Inhale 2 puffs into the lungs every 6 hours as needed for Wheezing  furosemide (LASIX) 40 MG tablet, Take 20 mg by mouth daily   linagliptin (TRADJENTA) 5 MG tablet, Take 5 mg by mouth daily  metoprolol tartrate (LOPRESSOR) 25 MG tablet, Take 12.5 mg by mouth 2 times daily 1/2 tab (=12.5mg) BID  pantoprazole (PROTONIX) 40 MG tablet, Take 40 mg by mouth daily  famotidine (PEPCID) 40 MG tablet, Take 40 mg by mouth nightly  gabapentin (NEURONTIN) 100 MG capsule, Take 200 mg by mouth 3 times daily. modafinil (PROVIGIL) 200 MG tablet, Take 200 mg by mouth daily. Indications: 200mg AM and 100mg in afternoon   montelukast (SINGULAIR) 10 MG tablet, Take 10 mg by mouth nightly  albuterol (PROVENTIL) (2.5 MG/3ML) 0.083% nebulizer solution, Take 2.5 mg by nebulization 3 times daily  modafinil (PROVIGIL) 100 MG tablet, Take 100 mg by mouth every evening.  Indications: 200mg AM and 100mg in afternoon  QUEtiapine (SEROQUEL) 50 MG tablet, Take 50 mg by mouth nightly  apixaban (ELIQUIS) 2.5 MG TABS tablet, Take 1 tablet by mouth 2 times daily  Cyanocobalamin (B-12) 5000 MCG CAPS, Take 5,000 mcg by mouth 2 times daily   tiotropium (SPIRIVA) 18 MCG inhalation capsule, Inhale 18 mcg into the lungs daily  isosorbide mononitrate (IMDUR) 30 MG extended release tablet, Take 1 tablet by mouth daily  fluticasone-vilanterol (BREO ELLIPTA) 100-25 MCG/INH AEPB inhaler, Inhale 2 puffs into the lungs daily  melatonin 5 MG TABS tablet, Take 5 mg by mouth nightly as needed (sleep)  calcitRIOL (ROCALTROL) 0.25 MCG capsule, Take 0.25 mcg by mouth 2 times daily   atorvastatin (LIPITOR) 20 MG tablet, TAKE 1 TABLET DAILY  carbidopa-levodopa (SINEMET CR)  MG per extended release tablet, Take 1 tablet by mouth 4 times daily  rOPINIRole (REQUIP) 2 MG tablet, Take 1 tablet by mouth 3 times daily  amiodarone (CORDARONE) 200 MG tablet, Take 200 mg by mouth daily   ferrous sulfate 325 (65 Fe) MG EC tablet, Take 1 tablet by mouth 2 times daily (with meals)  rasagiline mesylate 1 MG TABS, Take 1 tablet by mouth daily  Oxygen Concentrator, Dx: Pneumonia, use as directed. (Patient taking differently: Dx: Pneumonia, use as directed. ON 24/7)  Calcium Carb-Cholecalciferol (OYSTER SHELL CALCIUM/VITAMIN D) 250-125 MG-UNIT per tablet, Take 2 tablets by mouth daily  Calcium-Vitamin D-Vitamin K 650-12.5-40 MG-MCG-MCG CHEW, Take 1 tablet by mouth daily as needed (low calcium intake)  ONETOUCH VERIO strip, 1 each by In Vitro route daily As needed. ONE TOUCH ULTRA TEST strip, USE 1 STRIP THREE TIMES A DAY  magnesium oxide (MAG-OX) 400 (241.3 Mg) MG TABS tablet, Take 1 tablet by mouth 2 times daily (Patient taking differently: Take 400 mg by mouth daily )  senna (SENOKOT) 8.6 MG tablet, Take 1-2 tablets by mouth nightly     Allergies:    Dye [barium-containing compounds], Pcn [penicillins], and Bactrim [sulfamethoxazole-trimethoprim]    Social History:    reports that she quit smoking about 50 years ago. Her smoking use included cigarettes. She has a 30.00 pack-year smoking history. She has never used smokeless tobacco. She reports previous alcohol use. She reports that she does not use drugs. Family History:   family history includes Heart Disease in her mother; Heart Failure in her mother; High Blood Pressure in her mother; Hypertension in her mother.     REVIEW OF SYSTEMS:    Constitutional: negative, No fever no chills  Eyes: negative  Ears, nose, mouth, throat, and face: negative  Respiratory: negative, Shortness of breath with exertion no cough no 03/05/2021    GLUCOSE 107 03/05/2021    GLUCOSE 132 03/07/2012         ASSESSMENT:    Type 2 diabetes with renal complications  CKD 4  ECTOR on CPAP  Parkinson's  Paroxysmal atrial fibrillation   recent UTI  Extreme fatigue possible weakness on the right side with possible CVA  Chronic respiratory failure on home O2  ECTOR on CPAP  Patient Active Problem List   Diagnosis    Controlled type 2 diabetes mellitus with stage 3 chronic kidney disease, without long-term current use of insulin ([facility])    Hyperlipidemia    Essential hypertension    Chronic leg pain    Paroxysmal atrial fibrillation ([facility])    Asthma    Gastroesophageal reflux disease without esophagitis    ECTOR on CPAP    Type 2 diabetes mellitus with complication, without long-term current use of insulin ([facility])    Spinal stenosis in cervical region    Hypomagnesemia    Hyperphosphaturia    Hypocalcemia    Parkinson's disease ([address]    Acute cystitis with hematuria    Altered mental status    Iron deficiency anemia due to chronic blood loss    Morbid obesity with BMI of 40.0-44.9, adult ([facility])    Hyperplastic polyp of intestine    Diverticulosis    Panlobular emphysema ([facility])    Rapid atrial fibrillation ([facility])    CKD (chronic kidney disease) stage 3, GFR 30-59 ml/min    Anemia in chronic kidney disease    Chronic respiratory failure with hypoxia and hypercapnia ([facility])    Acute kidney injury superimposed on chronic kidney disease ([address]    CKD stage 4 due to type 2 diabetes mellitus ([facility])    E. coli UTI (urinary tract infection)    Nephrolithiasis    Candidal intertrigo    Venous insufficiency    Malabsorption    Anemia of chronic renal failure, stage 4 (severe) ([facility])    Iron deficiency    Coronary atherosclerosis    COPD exacerbation (HCC)    Restless leg syndrome    SIRS (systemic inflammatory response syndrome) ([facility])    Nausea and vomiting in adult    Bacterial UTI    Odynophagia    Esophageal dysphagia    Candida esophagitis ([facility]    Sepsis due to urinary tract infection (HCC)    Chronic respiratory failure with hypoxia (HCC)    Chronic diastolic congestive heart failure (HCC)    History of ESBL E. coli infection    Stage 4 chronic kidney disease (HCC)    Fever    Macrocytic anemia    Hypercalcemia    Monoclonal gammopathy of undetermined significance    Acute nonintractable headache    Anemia of chronic renal failure, stage 4 (severe) (HCC)    Traumatic closed displaced fracture of proximal end of right humerus, initial encounter    Urinary tract infection without hematuria    Metabolic encephalopathy    Acute kidney injury ([facility]    Right leg swelling    Hematuria    Chest pain, unspecified    Chronic obstructive pulmonary disease with (acute) exacerbation (HCC)    Altered mental state       PLAN:    Labs reviewed continue with physical therapy occupational therapy before meals and at bedtime Accu-Cheks with insulin coverage we will ask neurology and pulmonary to see Home medications reviewed restarted patient currently on anticoagulations see orders            Marvin Easton MD  7:06 PM  3/5/2021

## 2021-03-06 NOTE — PROGRESS NOTES
Subjective:     Follow-up weakness  More awake still feeling weak on the right side awaiting MRI brain neuro consult  ROS  Fever no chills no GI/ complaints no cardiopulmonary complaints except shortness of breath with exertion, no TIA no bleeding no headache no sore throat no skin lesions, no polyuria no polydipsia  physical exam  General Appearance: alert and oriented to person, place and time and in no acute distress  Skin: warm and dry, no rash or erythema  Head: normocephalic and atraumatic  Eyes: pupils equal, round, and reactive to light, sclera anicteric and positive pallor  Neck: neck supple and non tender without mass   Pulmonary/Chest: Air entry equal prolonged expiratory phase decreased at the bases no use of intercostal muscles no rhonchi  Cardiovascular: normal rate, regular rhythm, normal S1 and S2, no gallops, intact distal pulses and no carotid bruits  Abdomen: soft, non-tender, non-distended, normal bowel sounds, no masses or organomegaly  Extremities: no edema and pulses no Homans' sign  Neurologic: Alert oriented x3 with right-sided weakness    BP (!) 106/45   Pulse 59   Temp 98.8 °F (37.1 °C) (Oral)   Resp 16   Ht 5' 1\" (1.549 m)   Wt 171 lb 3.2 oz (77.7 kg)   LMP 04/03/2004 (Within Years)   SpO2 100%   BMI 32.35 kg/m²     CBC:   Lab Results   Component Value Date    WBC 6.2 03/06/2021    RBC 2.38 03/06/2021    HGB 8.0 03/06/2021    HCT 27.9 03/06/2021    .2 03/06/2021    MCH 33.6 03/06/2021    MCHC 28.7 03/06/2021    RDW 14.7 03/06/2021     03/06/2021    MPV 10.4 03/06/2021     BMP:    Lab Results   Component Value Date     03/06/2021    K 4.4 03/06/2021     03/06/2021    CO2 39 03/06/2021    BUN 39 03/06/2021    LABALBU 3.5 03/06/2021    LABALBU Detected 11/30/2018    CREATININE 1.84 03/06/2021    CALCIUM 9.2 03/06/2021    GFRAA 33 03/06/2021    LABGLOM 27 03/06/2021    GLUCOSE 92 03/06/2021    GLUCOSE 132 03/07/2012        Assessment:  Patient Active Problem List   Diagnosis    Controlled type 2 diabetes mellitus with stage 3 chronic kidney disease, without long-term current use of insulin (HCC)    Hyperlipidemia    Essential hypertension    Chronic leg pain    Paroxysmal atrial fibrillation (HCC)    Asthma    Gastroesophageal reflux disease without esophagitis    ECTOR on CPAP    Type 2 diabetes mellitus with complication, without long-term current use of insulin (HCC)    Spinal stenosis in cervical region    Hypomagnesemia    Hyperphosphaturia    Hypocalcemia    Parkinson's disease ([facility]    Acute cystitis with hematuria    Altered mental status    Iron deficiency anemia due to chronic blood loss    Morbid obesity with BMI of 40.0-44.9, adult (HCC)    Hyperplastic polyp of intestine    Diverticulosis    Panlobular emphysema (HCC)    Rapid atrial fibrillation (HCC)    CKD (chronic kidney disease) stage 3, GFR 30-59 ml/min    Anemia in chronic kidney disease    Chronic respiratory failure with hypoxia and hypercapnia (HCC)    Acute kidney injury superimposed on chronic kidney disease ([facility]    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 coli UTI (urinary tract infection)    Nephrolithiasis    Candidal intertrigo    Venous insufficiency    Malabsorption    Anemia of chronic renal failure, stage 4 (severe) (HCC)    Iron deficiency    Coronary atherosclerosis    COPD exacerbation (HCC)    Restless leg syndrome    SIRS (systemic inflammatory response syndrome) (HCC)    Nausea and vomiting in adult    Bacterial UTI    Odynophagia    Esophageal dysphagia    Candida esophagitis (HCC)    Sepsis due to urinary tract infection (HCC)    Chronic respiratory failure with hypoxia (HCC)    Chronic diastolic congestive heart failure (HCC)    History of ESBL E. coli infection    Stage 4 chronic kidney disease (HCC)    Fever    Macrocytic anemia    Hypercalcemia    Monoclonal gammopathy of undetermined significance    Acute nonintractable headache    Anemia of chronic renal failure, stage 4 (severe) (HCC)    Traumatic closed displaced fracture of proximal end of right humerus, initial encounter    Urinary tract infection without hematuria    Metabolic encephalopathy    Acute kidney injury ([facility]    Right leg swelling    Hematuria    Chest pain, unspecified    Chronic obstructive pulmonary disease with (acute) exacerbation (HCC)    Altered mental state     Type 2 diabetes with renal complications  CKD 3B  Recent UTI continue with Cipro  Right-sided weakness await MRI  Paroxysmal atrial fibrillation  Chronic respiratory failure with hypoxia chronic anticoagulation  Chronic diastolic CHF  Parkinson's  Plan:    Meds labs reviewed continue with before meals and at bedtime Accu-Cheks with insulin coverage physical therapy occupational therapy continue with Cipro for UTI avoid nephrotoxic drugs await MRI of the brain neuro consult see lashonda Dove MD  12:52 PM

## 2021-03-06 NOTE — PLAN OF CARE
Problem: Falls - Risk of:  Goal: Will remain free from falls  3/6/2021 0435 by Dwight Elam RN  Outcome: Ongoing     Problem: Skin Integrity:  Goal: Absence of new skin breakdown  3/6/2021 0435 by Dwight Elam RN  Outcome: Ongoing     Problem: Discharge Planning:  Goal: Discharged to appropriate level of care  3/6/2021 0435 by Dwight Elam RN  Outcome: Ongoing     Problem: Sensory Perception - Impaired:  Goal: Ability to maintain a stable neurologic state will improve  3/6/2021 0435 by Dwight Elam RN  Outcome: Ongoing     Problem: Mental Status - Impaired:  Goal: Mental status will improve  3/6/2021 0435 by Dwight Elam RN  Outcome: Ongoing     Problem:  Activity:  Goal: Ability to tolerate increased activity will improve  3/6/2021 0435 by Dwight Elam RN  Outcome: Ongoing

## 2021-03-06 NOTE — PLAN OF CARE
Problem: Falls - Risk of:  Goal: Will remain free from falls  Description: Will remain free from falls  3/6/2021 1629 by Linn Olson RN  Outcome: Ongoing     Problem: Falls - Risk of:  Goal: Absence of physical injury  Description: Absence of physical injury  Outcome: Ongoing     Problem: Skin Integrity:  Goal: Will show no infection signs and symptoms  Description: Will show no infection signs and symptoms  Outcome: Ongoing     Problem: Skin Integrity:  Goal: Absence of new skin breakdown  Description: Absence of new skin breakdown  3/6/2021 1629 by Linn Olson RN  Outcome: Ongoing     Problem: Discharge Planning:  Goal: Discharged to appropriate level of care  Description: Discharged to appropriate level of care  3/6/2021 1629 by Linn Olson RN  Outcome: Ongoing     Problem: Serum Glucose Level - Abnormal:  Goal: Ability to maintain appropriate glucose levels will improve  Description: Ability to maintain appropriate glucose levels will improve  Outcome: Ongoing     Problem: Sensory Perception - Impaired:  Goal: Ability to maintain a stable neurologic state will improve  Description: Ability to maintain a stable neurologic state will improve  3/6/2021 1629 by Linn Olson RN  Outcome: Ongoing     Problem: Mental Status - Impaired:  Goal: Mental status will improve  Description: Mental status will improve  3/6/2021 1629 by Linn Olson RN  Outcome: Ongoing     Problem:  Activity:  Goal: Ability to tolerate increased activity will improve  Description: Ability to tolerate increased activity will improve  3/6/2021 1629 by Linn Olson RN  Outcome: Ongoing     Problem: ACTIVITY INTOLERANCE/IMPAIRED MOBILITY  Goal: Mobility/activity is maintained at optimum level for patient  Outcome: Ongoing

## 2021-03-06 NOTE — CONSULTS
Pulmonary Medicine and [address] MD      Patient - Ryan Goldberg   MRN -  [id number]   Pattie # - [de-identified]   - 1945      Date of Admission -  3/5/2021 11:38 AM  Date of evaluation -  3/6/2021  Room -    Virgen Edwards MD Primary Care Physician - Julianna Soler MD     Reason for Consult    Obstructive sleep apnea    Assessment   · Chronic hypoxic/hypercarbic respiratory failure  · Obstructive sleep apnea/Obesity, on home CPAP therapy  · Hypersomnia  · COPD/former smoker with 30-pack-year smoking history  · Generalized weakness/altered mental status  · Chronic diastolic heart failure  · Elevated troponin  · DM, A. fib, CKD, GERD, HTN, Parkinson's    Recommendations   · Oxygen via nasal cannula, keep SPO2 90% or greater  · BiPAP while asleep and as needed during the day  · Albuterol Q 4 hours and prn  · Symbicort  · Spiriva Respimat  · Continue modafinil  · Incentive spirometry every hour while awake  · Neurology on consult, await input  · Labs: CBC and BMP in am  · DVT prophylaxis, on Eliquis  · Will follow with you    Problem List      Patient Active Problem List   Diagnosis    Controlled type 2 diabetes mellitus with stage 3 chronic kidney disease, without long-term current use of insulin (HCC)    Hyperlipidemia    Essential hypertension    Chronic leg pain    Paroxysmal atrial fibrillation (HCC)    Asthma    Gastroesophageal reflux disease without esophagitis    ECTOR on CPAP    Type 2 diabetes mellitus with complication, without long-term current use of insulin ([address]    Spinal stenosis in cervical region    Hypomagnesemia    Hyperphosphaturia    Hypocalcemia    Parkinson's disease ([address]    Acute cystitis with hematuria    Altered mental status    Iron deficiency anemia due to chronic blood loss    Morbid obesity with BMI of 40.0-44.9, adult ([address]    Hyperplastic polyp of intestine    Diverticulosis    Panlobular emphysema (HCC)    Rapid atrial fibrillation (HCC)    CKD (chronic kidney disease) stage 3, GFR 30-59 ml/min    Anemia in chronic kidney disease    Chronic respiratory failure with hypoxia and hypercapnia (HCC)    Acute kidney injury superimposed on chronic kidney disease ([address]    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 coli UTI (urinary tract infection)    Nephrolithiasis    Candidal intertrigo    Venous insufficiency    Malabsorption    Anemia of chronic renal failure, stage 4 (severe) (HCC)    Iron deficiency    Coronary atherosclerosis    COPD exacerbation (HCC)    Restless leg syndrome    SIRS (systemic inflammatory response syndrome) (HCC)    Nausea and vomiting in adult    Bacterial UTI    Odynophagia    Esophageal dysphagia    Candida esophagitis (HCC)    Sepsis due to urinary tract infection (HCC)    Chronic respiratory failure with hypoxia (HCC)    Chronic diastolic congestive heart failure (HCC)    History of ESBL E. coli infection    Stage 4 chronic kidney disease (HCC)    Fever    Macrocytic anemia    Hypercalcemia    Monoclonal gammopathy of undetermined significance    Acute nonintractable headache    Anemia of chronic renal failure, stage 4 (severe) (HCC)    Traumatic closed displaced fracture of proximal end of right humerus, initial encounter    Urinary tract infection without hematuria    Metabolic encephalopathy    Acute kidney injury ([address]    Right leg swelling    Hematuria    Chest pain, unspecified    Chronic obstructive pulmonary disease with (acute) exacerbation (HCC)    Altered mental state       HPI     Rhoda Adam is 70 y.o.,  female, who presented to the ER yesterday with overall complaints of generally not feeling well, generalized weakness and difficulty ambulating. She apparently had a fall 2 days ago but did not injure herself. She has had occasional lightheadedness.   She has been on antibiotics for a UTI over the past few days. She also had reported a headache and some generalized discomfort. She denied chest pain, shortness of breath, cough or fevers. No nausea or vomiting. PMHx   Past Medical History      Diagnosis Date    Acute on chronic diastolic congestive heart failure ([facility]     Anesthesia complication     DIFFICULTY WAKING OP    Asthma     Atrial fibrillation ([address] 2013    Cataracts, bilateral     Cellulitis     BILAT LOWER LEGS-PT STATES IS IMPROVING    Cerebral artery occlusion with cerebral infarction [facility])     Last stroke was February 2017 w/no deficits-TOTAL OF 3    CHF (congestive heart failure) (HCC)     Chronic kidney disease 2013    NOT ON DIALYSIS YET    Chronic kidney disease     Closed fracture of proximal end of right humerus with routine healing     Constipation     CHRONIC    Controlled type 2 diabetes mellitus with stage 3 chronic kidney disease, without long-term current use of insulin ([facility])     COPD (chronic obstructive pulmonary disease) ([address] 2008    PT. SEES DR. BECK. Home O2 at 2-3L/NC 24/7.     Difficult intravenous access     VEINS ROLL    Difficulty walking     AMBULATES WITH THERAPPY    Diverticulosis     DM2 (diabetes mellitus, type 2) ([address] 2008    Full dentures     FULL UPPER ONLY    GERD (gastroesophageal reflux disease) 2008    ON RX    H/O fall     Headache(784.0)     [place] (hard of hearing)     HAS HEARING AIDS BUT DOES NOT WEAR    Hyperlipidemia     Hyperplastic polyp of intestine     Hypertension 2008    Impaired ambulation     USES TRANFER CHAIR WALKER OR CANE    Kidney stone 2018    PRESENTLY-SMALL    Living in nursing home     [address] [place] [address] Morbid obesity with BMI of 40.0-44.9, adult ([facility] 12/30/2016    Muscle weakness     Nephrolithiasis 3/8/2018    Open wound     COCCYX    ECTOR on CPAP 2008    USES C-PAP NIGHTLY    Osteoarthritis     OSTEOARTHRITIS    Parkinson disease ([address] 2015    Restless leg syndrome 2013    MILD    Spinal stenosis in cervical region 6/2/2013    Type II or unspecified type diabetes mellitus without mention of complication, not stated as uncontrolled     Urethral caruncle 3/8/2018    Wears glasses     Wears glasses       Past Surgical History        Procedure Laterality Date    APPENDECTOMY      CERVICAL DISC SURGERY  2012    CERVICAL SPINE SURGERY  5/31/13    posterior c5-t1    COLONOSCOPY  2009    COLONOSCOPY  12/29/2016    incomplete was not cleaned out    COLONOSCOPY  12/30/2016    COLONOSCOPY  04/25/2017     SIGMOID COLON POLYPECTOMY:  HYPERPLASTIC POLYP ,   DIVERTICULOSIS    CYSTORRHAPHY  04/04/2018    CYSTOSCOPY N/A 10/28/2020    CYSTOSCOPY performed by Bubba Shaikh MD at [facility] 10/30/2020    CYSTOSCOPY PYELOGRAM WITH HOLMIUM LASER RIGHT STENT INSERTION performed by Bubba Shaikh MD at [facility] 2017    CATARACTS W/ IOL    HARDWARE REMOVAL Right 07/05/2019    HARDWARE REMOVAL PINS  HUMERUS     HARDWARE REMOVAL Right 7/5/2019    HARDWARE REMOVAL PINS  HUMERUS  C-ARM performed by Terrell Bowen MD at [address] [facility]  05/31/2013    Dr. Tracy Schofield N/A 6/5/2018    BRONCHOSCOPY performed [facility]amjit Livingston MD [facility] N/A 4/25/2017    COLONOSCOPY POLYPECTOMY HOT BIOPSY performed by Delbert Moses MD at [facility] 4/4/2018    CYSTOSCOPY performed by Virlinda Burkitt, MD at [address] Right 5/28/2019    SHOULDER CLOSED REDUCTION PERCUTANEOUS PINNING RIGHT performed by Terrell Bowen MD at [place] [address]  12/28/2016    gastritis, esophagitis,     UPPER GASTROINTESTINAL ENDOSCOPY N/A 8/29/2018    EGD BIOPSY performed by Delbert Moses MD at [address] [place] [address] Meds    Current Medications    carbidopa-levodopa  1 tablet Oral 4x Daily    famotidine  40 mg Oral Nightly    sodium chloride flush  10 mL Intravenous 2 times per day    albuterol  2.5 mg Nebulization TID    amiodarone  200 mg Oral Daily    apixaban  2.5 mg Oral BID    atorvastatin  20 mg Oral Daily    calcitRIOL  0.25 mcg Oral BID    oyster shell calcium/vitamin D  2 tablet Oral Daily    B-12  5,000 mcg Oral BID    ferrous sulfate  325 mg Oral BID WC    budesonide-formoterol  2 puff Inhalation BID    furosemide  20 mg Oral Daily    gabapentin  200 mg Oral TID    isosorbide mononitrate  30 mg Oral Daily    linagliptin  5 mg Oral Daily    Magnesium  400 mg Oral BID    metoprolol tartrate  12.5 mg Oral BID    modafinil  100 mg Oral QPM    modafinil  200 mg Oral Daily    montelukast  10 mg Oral Nightly    QUEtiapine  50 mg Oral Nightly    pantoprazole  40 mg Oral Daily    rasagiline mesylate  1 tablet Oral Daily    rOPINIRole  2 mg Oral TID    tiotropium  2 puff Inhalation Daily    sodium chloride flush  10 mL Intravenous 2 times per day    albuterol         sodium chloride flush, sodium chloride flush, promethazine **OR** ondansetron, polyethylene glycol, acetaminophen **OR** acetaminophen  IV Drips/Infusions    Home Medications  Medications Prior to Admission: albuterol sulfate HFA (PROAIR HFA) 108 (90 Base) MCG/ACT inhaler, Inhale 2 puffs into the lungs every 6 hours as needed for Wheezing  furosemide (LASIX) 40 MG tablet, Take 20 mg by mouth daily   linagliptin (TRADJENTA) 5 MG tablet, Take 5 mg by mouth daily  metoprolol tartrate (LOPRESSOR) 25 MG tablet, Take 12.5 mg by mouth 2 times daily 1/2 tab (=12.5mg) BID  pantoprazole (PROTONIX) 40 MG tablet, Take 40 mg by mouth daily  famotidine (PEPCID) 40 MG tablet, Take 40 mg by mouth nightly  gabapentin (NEURONTIN) 100 MG capsule, Take 200 mg by mouth 3 times daily. modafinil (PROVIGIL) 200 MG tablet, Take 200 mg by mouth daily. oxide (MAG-OX) 400 (241.3 Mg) MG TABS tablet, Take 1 tablet by mouth 2 times daily (Patient taking differently: Take 400 mg by mouth daily )  senna (SENOKOT) 8.6 MG tablet, Take 1-2 tablets by mouth nightly     Allergies    Dye [barium-containing compounds], Pcn [penicillins], and Bactrim [sulfamethoxazole-trimethoprim]  Social History     Social History     Tobacco Use    Smoking status: Former Smoker     Packs/day: 2.00     Years: 15.00     Pack years: 30.00     Types: Cigarettes     Quit date: 10/31/1970     Years since quittin.3    Smokeless tobacco: Never Used   Substance Use Topics    Alcohol use: Not Currently     Family History          Problem Relation Age of Onset    Heart Failure Mother     Hypertension Mother     Heart Disease Mother     High Blood Pressure Mother      ROS - 6 systems   General Denies any fever or chills  HEENT Denies any diplopia, tinnitus or vertigo  Resp Denies any shortness of breath, cough or wheezing  Cardiac Denies any chest pain, palpitations, claudication or edema  GI Denies any melena, hematochezia, hematemesis or pyrosis   Denies any frequency, urgency, hesitancy or incontinence  Heme Denies bruising or bleeding easily  Endocrine Denies any history of thyroid disease  Neuro Denies any focal motor or sensory deficits  Psychiatric Denies anxiety, depression, suicidal ideation  Skin Denies rashes, itching, open sores    Vitals     height is 5' 1\" (1.549 m) and weight is 171 lb 3.2 oz (77.7 kg). Her oral temperature is 97.9 °F (36.6 °C). Her blood pressure is 108/41 (abnormal) and her pulse is 61. Her respiration is 18 and oxygen saturation is 100%. Body mass index is 32.35 kg/m². I/O    No intake or output data in the 24 hours ending 21 0912  No intake/output data recorded.    Patient Vitals for the past 96 hrs (Last 3 readings):   Weight   21 0446 171 lb 3.2 oz (77.7 kg)   21 1432 175 lb (79.4 kg)     Exam   General Appearance  awake, alert, oriented, in no acute distress  HEENT - Head is normocephalic, atraumatic. Pupil reactive to light  Neck - Supple,  trachea midline and straight  Lungs - moderate air exchange, no wheezes or crackles  Cardiovascular - Heart sounds are normal.  Regular rhythm normal rate without murmur, gallop or rub. Abdomen - Soft, nontender, nondistended, no masses or organomegaly  Neurologic - CN II-XII are grossly intact.  There are no focal motor or sensory deficits  Skin - No bruising or bleeding  Extremities - No cyanosis, clubbing or edema    Labs  - Old records and notes have been reviewed in [facility]   CBC     Lab Results   Component Value Date    WBC 6.2 03/06/2021    RBC 2.38 03/06/2021    HGB 8.0 03/06/2021    HCT 27.9 03/06/2021     03/06/2021    .2 03/06/2021    MCH 33.6 03/06/2021    MCHC 28.7 03/06/2021    RDW 14.7 03/06/2021    NRBC 1 06/11/2018    METASPCT 1 06/10/2018    LYMPHOPCT 16 03/06/2021    MONOPCT 8 03/06/2021    BASOPCT 0 03/06/2021    MONOSABS 0.50 03/06/2021    LYMPHSABS 0.99 03/06/2021    EOSABS 0.12 03/06/2021    BASOSABS 0.00 03/06/2021    DIFFTYPE NOT REPORTED 03/06/2021     BMP   Lab Results   Component Value Date     03/06/2021    K 4.4 03/06/2021     03/06/2021    CO2 39 03/06/2021    BUN 39 03/06/2021    CREATININE 1.84 03/06/2021    GLUCOSE 92 03/06/2021    GLUCOSE 132 03/07/2012    CALCIUM 9.2 03/06/2021    MG 1.6 10/31/2020     LFTS  Lab Results   Component Value Date    ALKPHOS 62 03/06/2021    ALT <5 03/06/2021    AST 10 03/06/2021    PROT 5.7 03/06/2021    BILITOT 0.12 03/06/2021    BILIDIR <0.08 09/01/2019    IBILI 0.38 06/04/2019    LABALBU 3.5 03/06/2021    LABALBU Detected 11/30/2018     PTT  Lab Results   Component Value Date    APTT 28.6 03/05/2021     INR   Lab Results   Component Value Date    INR 1.0 03/05/2021    INR 1.0 12/30/2019    INR 0.9 09/01/2019    PROTIME 13.4 03/05/2021    PROTIME 10.0 12/30/2019    PROTIME 9.6 (L) 09/01/2019       Radiology CXR         (See actual reports for details)    \"Thank you for asking us to see this patient\"    Case discussed with nurse and patient/  Questions and concerns addressed.     Electronically signed by     Medina Park MD on 3/6/2021 at 11:39 AM  Pulmonary Critical Care and Sleep Medicine,  [facility]  Cell: [phone]  Office: [phone]

## 2021-03-06 NOTE — PROGRESS NOTES
Patient is observation and spoke with  who states he will bring in home medications tomorrow. Patient &  would like for her to get medications tonight and understand medications dispensed from our pharmacy will be charged out of pocket.

## 2021-03-06 NOTE — CONSULTS
Chris-LUMBAR    [place] [address] INCL FLUOR GDNCE DX W/CELL WASHG SPX N/A 2018    BRONCHOSCOPY performed by Rafael Renteria MD at [address] FLX W/REMOVAL LESION BY HOT BX FORCEPS N/A 2017    COLONOSCOPY POLYPECTOMY HOT BIOPSY performed by Jethro Gayle MD at [facility] N/A 2018    CYSTOSCOPY performed by Miles Trammell MD at [address] Right 2019    SHOULDER CLOSED REDUCTION PERCUTANEOUS PINNING RIGHT performed by Lane Cardona MD at [place] [address] ENDOSCOPY  2016    gastritis, esophagitis,     UPPER GASTROINTESTINAL ENDOSCOPY N/A 2018    EGD BIOPSY performed by Jethro Gayle MD at [facility] History   Problem Relation Age of Onset    Heart Failure Mother     Hypertension Mother     Heart Disease Mother     High Blood Pressure Mother        Social History     Socioeconomic History    Marital status:      Spouse name: None    Number of children: None    Years of education: None    Highest education level: None   Occupational History    None   Social Needs    Financial resource strain: None    Food insecurity     Worry: None     Inability: None    Transportation needs     Medical: None     Non-medical: None   Tobacco Use    Smoking status: Former Smoker     Packs/day: 2.00     Years: 15.00     Pack years: 30.00     Types: Cigarettes     Quit date: 10/31/1970     Years since quittin.3    Smokeless tobacco: Never Used   Substance and Sexual Activity    Alcohol use: Not Currently    Drug use: No     Comment: Pt denies use.      Sexual activity: None     Comment: not for the past year d/t illness, denies  concerns/pain   Lifestyle    Physical activity     Days per week: None     Minutes per session: None    Stress: None   Relationships    Social connections     Talks on phone: None     Gets together: None     Attends Yarsanism service: None     Active member of club or organization: None     Attends meetings of clubs or organizations: None     Relationship status: None    Intimate partner violence     Fear of current or ex partner: None     Emotionally abused: None     Physically abused: None     Forced sexual activity: None   Other Topics Concern    None   Social History Narrative    None       Current Facility-Administered Medications   Medication Dose Route Frequency Provider Last Rate Last Admin    carbidopa-levodopa (SINEMET)  MG per tablet 1 tablet  1 tablet Oral 4x Daily Eulogio Esquivel MD   1 tablet at 03/06/21 1005    famotidine (PEPCID) tablet 40 mg  40 mg Oral Nightly Eulogio Esquivel MD        sodium chloride flush 0.9 % injection 10 mL  10 mL Intravenous 2 times per day Eulogio Esquivel MD        sodium chloride flush 0.9 % injection 10 mL  10 mL Intravenous PRN Eulogio Esquivel MD        albuterol (PROVENTIL) nebulizer solution 2.5 mg  2.5 mg Nebulization TID Eulogio Esquivel MD   2.5 mg at 03/05/21 2115    amiodarone (CORDARONE) tablet 200 mg  200 mg Oral Daily Eulogio Esquivel MD   200 mg at 03/06/21 1004    apixaban (ELIQUIS) tablet 2.5 mg  2.5 mg Oral BID Eulogio Esquivel MD   2.5 mg at 03/06/21 1023    atorvastatin (LIPITOR) tablet 20 mg  20 mg Oral Daily Eulogio Esquivel MD   20 mg at 03/05/21 2349    calcitRIOL (ROCALTROL) capsule 0.25 mcg  0.25 mcg Oral BID Eulogio Esquivel MD   0.25 mcg at 03/06/21 1003    oyster shell calcium/vitamin D 250-125 MG-UNIT per tablet 500 mg  2 tablet Oral Daily Eulogio Esquivel MD   500 mg at 03/05/21 2349    B-12 CAPS 5,000 mcg  5,000 mcg Oral BID Eulogio Esquivel MD   5,000 mcg at 03/06/21 1000    ferrous sulfate (FE TABS 325) EC tablet 325 mg  325 mg Oral BID WC Eulogio Esquivel MD   325 mg at 03/06/21 1000    budesonide-formoterol (SYMBICORT) 160-4.5 MCG/ACT inhaler 2 puff  2 puff Inhalation BID Eulogio Esquivel MD   2 puff at 03/06/21 0915    furosemide Orientation Alert and oriented x 3   Attention and concentration normal  Short term memory 2 words out of 3 in one minute  Language process and speech normal . No aphasia   Cranial nerve 2 normal acuety and visual fields  Cranial nerve 3, 4 and 6 . Extraocular muscles are intact . Pupils are equal and reactive   Cranial nerve 5 . Intact corneal reflex. Normal facial sensation  Cranial nerve 7 normal exam   Cranial nerve 8. Grossly intact hearing   Cranial nerve 9 and 10. Symmetric palate elevation   Cranial nerve 11 , 5 out of 5 strength   Cranial Nerve 12 midline tongue . No atrophy  Sensation . Normal pinprick and light touch   Deep Tendon Reflexes normal  Plantar response flexor bilaterally    Assessment :    Gait imbalance . Falling . Parkinson's disease. Postural lightheadedness . Atrial fibrillation     Plan:    MRI of Head . PT/OT .  Orthostatic blood pressures

## 2021-03-07 NOTE — PROGRESS NOTES
Pulmonary Critical Care Progress Note  Maci Collins MD     Patient seen for the follow up of chronic hypoxic respiratory failure, COPD, ECTOR, hypersomnia    Subjective:  Resting in bed, no distress. No significant overnight events noted, she wore her home CPAP all night last night. She denies shortness of breath beyond her baseline. No cough or chest pain. She is still not feeling back to her normal self. Examination:  Vitals: BP (!) 128/43   Pulse 64   Temp 98.4 °F (36.9 °C) (Oral)   Resp 16   Ht 5' 1\" (1.549 m)   Wt 172 lb 6.4 oz (78.2 kg)   LMP 04/03/2004 (Within Years)   SpO2 100%   BMI 32.57 kg/m²   General appearance: alert and cooperative with exam resting in bed  Neck: No JVD  Lungs: Moderate air exchange, no crackles or wheezing  Heart: regular rate and rhythm, S1, S2 normal, no gallop  Abdomen: Soft, non tender, + BS  Extremities: no cyanosis or clubbing.  No significant edema    LABs:  CBC:   Recent Labs     03/06/21  0639 03/07/21  0246   WBC 6.2 6.4   HGB 8.0* 8.3*   HCT 27.9* 28.7*   * 113*     BMP:   Recent Labs     03/06/21  0639 03/07/21  0246    143   K 4.4 4.3   CO2 39* 37*   BUN 39* 39*   CREATININE 1.84* 1.98*   LABGLOM 27* 25*   GLUCOSE 92 107*     PT/INR:   Recent Labs     03/05/21  1215   PROTIME 13.4   INR 1.0     APTT:  Recent Labs     03/05/21  1215   APTT 28.6     LIVER PROFILE:  Recent Labs     03/05/21  1215 03/06/21  0639   AST 15 10   ALT <5* <5*   LABALBU 4.0 3.5     Radiology:      Impression:  · Chronic hypoxic/hypercarbic respiratory failure  · Obstructive sleep apnea/Obesity, on home CPAP therapy  · Hypersomnia  · COPD/former smoker with 30-pack-year smoking history  · Generalized weakness/altered mental status  · Chronic diastolic heart failure  · Elevated troponin  · DM, A. fib, CKD, GERD, HTN, Parkinson's  · Acute microinfarct right mid corona radiata    Recommendations:  · Oxygen via nasal cannula, keep SPO2 90% or greater  · Home CPAP while asleep and as needed during the day  · Albuterol Q 4 hours and prn  · Symbicort  · Spiriva Respimat  · Continue modafinil  · Incentive spirometry every hour while awake  · Neurology following, plans for MRA head and neck & carotid dopplers  · Labs: CBC and BMP in am  · DVT prophylaxis, on Eliquis  · Discussed with patient and   · Will follow with you    Electronically signed by     Paramjit Livingston MD on 3/7/2021 at 11:49 AM  Pulmonary Critical Care and Sleep Medicine,  [facility]  Cell: [phone]  Office: [phone]

## 2021-03-07 NOTE — FLOWSHEET NOTE
Patient is a reoccurring patient. Patient expresses no major needs or prayers at this time. Patient declines visit at this time.    leaves prayer card for possible follow up.     03/07/21 0431   Encounter Summary   Services provided to: Patient   Referral/Consult From: [facility]   (3-7-21)   Complexity of Encounter Low   Length of Encounter 15 minutes   Spiritual Assessment Completed Yes   Routine   Type Initial   Assessment Passive   Intervention Explored feelings, thoughts, concerns   Outcome Refused/declined

## 2021-03-07 NOTE — PROGRESS NOTES
Occupational Therapy   Occupational Therapy Initial Assessment  Date: 3/7/2021   Patient Name: Carey Trejo  MRN: [id number]     : 1945     RN Cinthya Jordan reports patient is medically stable for therapy treatment this date. Chart reviewed prior to treatment and patient is agreeable for therapy. All lines intact and patient positioned comfortably at end of treatment. All patient needs addressed prior to ending therapy session. Date of Service: 3/7/2021    Discharge Recommendations:  Subacute/Skilled Nursing Facility  OT Equipment Recommendations  Equipment Needed: Yes  Mobility Devices: ADL Assistive Devices  ADL Assistive Devices: Emergency Alert System;Long-handled [place];Long-handled Sponge;Reacher    Assessment   Performance deficits / Impairments: Decreased functional mobility ; Decreased ADL status; Decreased ROM; Decreased strength;Decreased sensation;Decreased endurance;Decreased cognition;Decreased safe awareness;Decreased balance;Decreased high-level IADLs;Decreased fine motor control;Decreased posture;Decreased coordination  Assessment: Pt would benefit from continued skilled OT services to increase I and safety during functional tasks to return home at prior level of function as able. Prognosis: Fair  Decision Making: High Complexity  OT Education: OT Role;Transfer Training;Plan of Care;Energy Conservation; Family Education; ADL Adaptive Strategies  Patient Education: Safety in function, fall prevention/call light use, recommendations for continued therapy, benefits of being OOB  REQUIRES OT FOLLOW UP: Yes  Activity Tolerance  Activity Tolerance: Patient Tolerated treatment well  Activity Tolerance: Fair Minus  Safety Devices  Safety Devices in place: Yes  Type of devices: Nurse notified;Gait belt;Call light within reach; Chair alarm in place; Left in chair(elevated BLE on pillow for skin intergrity, elevated R UE for proper postioning and increased comfort.)           Patient Diagnosis(es): The primary encounter diagnosis was Altered mental status, unspecified altered mental status type. A diagnosis of Elevated troponin was also pertinent to this visit. has a past medical history of Acute on chronic diastolic congestive heart failure ([address] Anesthesia complication, Asthma, Atrial fibrillation ([address] Cataracts, bilateral, Cellulitis, Cerebral artery occlusion with cerebral infarction ([address] CHF (congestive heart failure) ([address] Chronic kidney disease, Chronic kidney disease, Closed fracture of proximal end of right humerus with routine healing, Constipation, Controlled type 2 diabetes mellitus with stage 3 chronic kidney disease, without long-term current use of insulin ([address] COPD (chronic obstructive pulmonary disease) ([address] Difficult intravenous access, Difficulty walking, Diverticulosis, DM2 (diabetes mellitus, type 2) ([address] Full dentures, GERD (gastroesophageal reflux disease), H/O fall, Headache(784.0), [place] (hard of hearing), Hyperlipidemia, Hyperplastic polyp of intestine, Hypertension, Impaired ambulation, Kidney stone, Living in nursing home, Morbid obesity with BMI of 40.0-44.9, adult ([address] Muscle weakness, Nephrolithiasis, Open wound, ECTOR on CPAP, Osteoarthritis, Parkinson disease ([address] Restless leg syndrome, Spinal stenosis in cervical region, Type II or unspecified type diabetes mellitus without mention of complication, not stated as uncontrolled, Urethral caruncle, Wears glasses, and Wears glasses. has a past surgical history that includes Cervical disc surgery (2012); Appendectomy; Tonsillectomy and adenoidectomy (1953); hernia repair (1999); eye surgery (Bilateral, 2017); Cervical spine surgery (5/31/13); laminectomy (05/31/2013); Upper gastrointestinal endoscopy (12/28/2016); Colonoscopy (2009); Colonoscopy (12/29/2016); Colonoscopy (12/30/2016); Colonoscopy (04/25/2017); pr colsc flx w/removal lesion by hot bx forceps (N/A, 4/25/2017);  Cystorrhaphy (04/04/2018); pr cystourethroscopy,biopsies (N/A, 4/4/2018); pr [facility] incl fluor gdnce dx w/cell washg spx (N/A, 6/5/2018); Upper gastrointestinal endoscopy (N/A, 8/29/2018); shoulder fracture surgery (Right, 5/28/2019); Hardware Removal (Right, 07/05/2019); Hardware Removal (Right, 7/5/2019); Cystoscopy (N/A, 10/28/2020); and Cystoscopy (N/A, 10/30/2020). PER H&P: Patient is a 30-year-old female multiple comorbidities including diabetes CKD A. fib on Eliquis here feeling generally unwell and having generalized weakness and difficulty ambulating. She reportedly stood up off the couch and felt very weak. She states she fell yesterday but did not injure herself. She states she has been on antibiotics for few days for UTI but is unsure if it is helping. States she has had a headache and some generalized discomfort. No chest pain shortness of breath or cough or fevers. No vomiting or diarrhea dark or bloody stools. No focal weakness numbness tingling.        Restrictions  Restrictions/Precautions  Restrictions/Precautions: General Precautions, Fall Risk, Up as Tolerated, Contact Precautions  Position Activity Restriction  Other position/activity restrictions: fall history, ALARMS, up with assist    Subjective   General  Chart Reviewed: Yes  Patient assessed for rehabilitation services?: Yes  Family / Caregiver Present: Yes( Cornelius)    Social/Functional History  Social/Functional History  Lives With: Spouse  Type of Home: House  Home Layout: One level  Home Access: Stairs to enter without rails, Ramped entrance  Entrance Stairs - Number of Steps: 2(can do stairs with assist from spouse in last month; prior to that was not needing assist)  Bathroom Shower/Tub: Tub/Shower unit  [employer]: Standard  Bathroom Equipment: Grab bars in shower, Shower chair, Grab bars around toilet  Home Equipment: Rolling walker, Cane, Quad cane, 4 wheeled walker, Oxygen, Lift chair(2L O2 24/7; transport chair; lift chair but does not use lift option; sleeps in regular bed with bed rail)  Receives Help From: Family  ADL Assistance: Needs assistance(assist for bathing and lower body dressing (spouse allows pt. to attempt and she is sometimes successful with lower body dressing))  Homemaking Assistance: Needs assistance  Homemaking Responsibilities: No  Ambulation Assistance: Independent(transport chair in community;  uses RW in the home)  Transfer Assistance: Needs assistance(if chair is \"too low\"- difficult lately)  Active : No  Patient's  Info: spouse drives and caregiver assists as needed as well   Leisure & Hobbies: playing on Ipad  Additional Comments: Home health PT/OT from [address] [place] [address] after SNF stay s/p \"pulmonary/kidney issues\" per spouse;  still has home speech therapy due to difficulty speaking/swallowing from Parkinsons (no dietary restrictions.)  Currently in pain management for RUE nerve pain s/p humerus fx \"one yr. ago;  spouse notes a decline gait/stairs/overall weakness in last month       Objective   Vision: Impaired  Vision Exceptions: Wears glasses at all times  Hearing: Exceptions to [facility]  Hearing Exceptions: Hard of hearing/hearing concerns    Orientation  Overall Orientation Status: Within Functional Limits  Observation/Palpation  Posture: Poor(with RW)  Observation: weakness/coordination deficit Rt. UE  Edema: none     Balance  Sitting Balance: Minimal assistance(Pt was fearful of fall requesting to hold onto therapist arms while seated EOB. Required tactile assist to maintain midline posture.)  Standing Balance: Moderate assistance(x2 staff assist for safety and balance with RW)  Standing Balance  Time: standing wally ~ 1.5 min for functional mobility  Functional Mobility  Functional - Mobility Device: Rolling Walker  Activity: (bed to bedside chair)  Assist Level:  Moderate assistance(x2 staff assist for safety and balance)  Functional Mobility Comments: Pt required Mod verbal cues/tactile assist for handplacement on RW, upright posture, sequencing, pursed lip breathing, weight shifting, RW mgmt/navigation and awareness/assist with lines all to increase safety. D/t Parkinsons pt requires increased time for initation on gait. Toilet Transfers  Toilet Transfer: Unable to assess  Toilet Transfers Comments: N/T Pt with no needs at this time. ADL  Feeding: Minimal assistance;Setup  Grooming: Setup; Moderate assistance(to comb hair sitting in recliner, pt had difficulty grasping comb dropping it multiple times)  UE Bathing: Setup; Moderate assistance  LE Bathing: Setup;Maximum assistance  UE Dressing: Setup; Moderate assistance(with hosp gown)  LE Dressing: Setup;Maximum assistance(to don B socks while supine in bed)  Toileting: Dependent/Total(external cath)     Tone RUE  RUE Tone: Normotonic  Tone LUE  LUE Tone: Normotonic  Coordination  Movements Are Fluid And Coordinated: No  Coordination and Movement description: Tremors; Fine motor impairments;Gross motor impairments;Decreased speed;Decreased accuracy; Right UE;Left UE(Pt has Parkinsons disease. Tremors in B hands, difficulty with opposition.)     Bed mobility  Supine to Sit: Moderate assistance;2 Person assistance  Sit to Supine: Unable to assess(Pt up to recliner)  Scootin Person assistance;Maximal assistance;Dependent/Total  Comment: Pt requires Mod verbal cues for hand placement on bedrail, sequencing and line mgmt. Transfers  Stand Step Transfers: Moderate assistance;2 Person assistance  Sit to stand: Moderate assistance;2 Person assistance  Stand to sit: Moderate assistance;2 Person assistance  Transfer Comments: Pt required MOD verbal cues/tactile assist for hand placement on RW, upright posture, nose over toes, pacing self, RW safety, RW mgmt, awareness/assist with lines, weight shifting, sequencing and initation all to increase safety and reduce fall risk.      Cognition  Overall Cognitive Status: Exceptions  Arousal/Alertness: Delayed responses to stimuli Following Commands: Follows all commands without difficulty  Attention Span: Attends with cues to redirect  Memory: Appears intact  Safety Judgement: Decreased awareness of need for safety;Decreased awareness of need for assistance  Problem Solving: Assistance required to generate solutions;Assistance required to implement solutions;Decreased awareness of errors;Assistance required to identify errors made;Assistance required to correct errors made  Insights: Decreased awareness of deficits  Initiation: Requires cues for some  Sequencing: Requires cues for all        Sensation  Overall Sensation Status: Impaired(nerve pain Rt. UE s/p humerus fx one yr. ago- very hypersensitive to touch)        LUE AROM (degrees)  LUE AROM : WFL  RUE AROM (degrees)  RUE AROM : Exceptions  RUE General AROM: shld ~ 30 degrees, Rest WFL  LUE Strength  Gross LUE Strength: Exceptions to [facility]  LUE Strength Comment: ~ 4/5  RUE Strength  Gross RUE Strength: Exceptions to [facility]  RUE Strength Comment: shld ~ 3/5, Rest 4-/5                   Plan   Plan  Times per week: 4-5x/wk 1x/day as walyl  Current Treatment Recommendations: Strengthening, ROM, Balance Training, Functional Mobility Training, Safety Education & Training, Neuromuscular Re-education, Endurance Training, Patient/Caregiver Education & Training, Self-Care / ADL, Positioning, Equipment Evaluation, Education, & procurement      AM-PAC Score        AM-[facility] Inpatient Daily Activity Raw Score: 13 (03/07/21 1335)  AM-PAC Inpatient ADL T-Scale Score : 32.03 (03/07/21 1335)  ADL Inpatient CMS 0-100% Score: 63.03 (03/07/21 1335)  ADL Inpatient CMS G-Code Modifier : CL (03/07/21 1335)    Goals  Short term goals  Time Frame for Short term goals: By discharge, pt to demo  Short term goal 1: bed mobility to Min A of 1 with use of bedrail as needed. Short term goal 2: ADL transfers and functional mobility to Min A of 1 with use of AD as needed.   Short term goal 3: toileting to Mod A with use of AD/Grab bars as needed. Short term goal 4: UB ADLs to Min and LB ADLs to Mod A with use of AD/AE as needed. Short term goal 5: increased BUE strength by 1/2 grade/ increased R shld AROM by 10-15 degrees to assist with self care. Long term goals  Long term goal 1: pt/caregiver to be I with fall prevention/EC/WS tech, recommendations for AE, and BUE HEP with use of handouts as neededed. Patient Goals   Patient goals : To get stronger and go home!        Therapy Time   Individual Concurrent Group Co-treatment   Time In 1112 (Plus 10 min for chart review/RN communication)          Time Out 1140         Minutes 28+10 = 68 [facility], OT

## 2021-03-07 NOTE — CONSULTS
Full dentures, GERD (gastroesophageal reflux disease), H/O fall, Headache(784.0), [place] (hard of hearing), Hyperlipidemia, Hyperplastic polyp of intestine, Hypertension, Impaired ambulation, Kidney stone, Living in nursing home, Morbid obesity with BMI of 40.0-44.9, adult ([address] Muscle weakness, Nephrolithiasis, Open wound, ECTOR on CPAP, Osteoarthritis, Parkinson disease ([address] Restless leg syndrome, Spinal stenosis in cervical region, Type II or unspecified type diabetes mellitus without mention of complication, not stated as uncontrolled, Urethral caruncle, Wears glasses, and Wears glasses.     Surgical History:     Past Surgical History:   Procedure Laterality Date    APPENDECTOMY      CERVICAL [address] SURGERY  2012    CERVICAL SPINE SURGERY  5/31/13    posterior c5-t1    COLONOSCOPY  2009    COLONOSCOPY  12/29/2016    incomplete was not cleaned out    COLONOSCOPY  12/30/2016    COLONOSCOPY  04/25/2017     SIGMOID COLON POLYPECTOMY:  HYPERPLASTIC POLYP ,   DIVERTICULOSIS    CYSTORRHAPHY  04/04/2018    CYSTOSCOPY N/A 10/28/2020    [facility] [address] [facility]/30/2020    CYSTOSCOPY PYELOGRAM WITH HOLMIUM LASER RIGHT STENT INSERTION performed by Lovely Arevalo MD at [facility] Bilateral 2017    CATARACTS W/ IOL    HARDWARE REMOVAL Right 07/05/2019    HARDWARE REMOVAL PINS  HUMERUS     HARDWARE REMOVAL Right 7/5/2019    HARDWARE REMOVAL PINS  HUMERUS  C-ARM performed by Vale Arellano MD at [address]  05/31/2013    Dr. Amador Cheema N/A 6/5/2018    BRONCHOSCOPY performed by Robe Martinez MD at [address] [place] [address] W/REMOVAL LESION BY HOT BX FORCEPS N/A 4/25/2017    COLONOSCOPY POLYPECTOMY HOT BIOPSY performed by Ayesha Hernandes MD at [facility] N/A 4/4/2018    CYSTOSCOPY performed by Filipe Leslie MD at [address] FRACTURE SURGERY Right 5/28/2019    SHOULDER CLOSED REDUCTION PERCUTANEOUS PINNING RIGHT performed by Zac Fischer MD at [address] ENDOSCOPY  12/28/2016    gastritis, esophagitis,     UPPER GASTROINTESTINAL ENDOSCOPY N/A 8/29/2018    EGD BIOPSY performed by Pablo Hall MD at [facility] OR       Medications:   Scheduled Meds:   carbidopa-levodopa  1 tablet Oral 4x Daily    famotidine  40 mg Oral Nightly    apixaban  5 mg Oral BID    sodium chloride flush  10 mL Intravenous 2 times per day    albuterol  2.5 mg Nebulization TID    amiodarone  200 mg Oral Daily    atorvastatin  20 mg Oral Daily    calcitRIOL  0.25 mcg Oral BID    oyster shell calcium/vitamin D  2 tablet Oral Daily    B-12  5,000 mcg Oral BID    ferrous sulfate  325 mg Oral BID WC    budesonide-formoterol  2 puff Inhalation BID    furosemide  20 mg Oral Daily    gabapentin  200 mg Oral TID    isosorbide mononitrate  30 mg Oral Daily    linagliptin  5 mg Oral Daily    Magnesium  400 mg Oral BID    metoprolol tartrate  12.5 mg Oral BID    modafinil  100 mg Oral QPM    modafinil  200 mg Oral Daily    montelukast  10 mg Oral Nightly    QUEtiapine  50 mg Oral Nightly    pantoprazole  40 mg Oral Daily    rasagiline mesylate  1 tablet Oral Daily    rOPINIRole  2 mg Oral TID    tiotropium  2 puff Inhalation Daily    sodium chloride flush  10 mL Intravenous 2 times per day     Continuous Infusions:   Outpatient Medications Marked as Taking for the 3/5/21 encounter [facility] Encounter)   Medication Sig Dispense Refill    albuterol sulfate HFA (PROAIR HFA) 108 (90 Base) MCG/ACT inhaler Inhale 2 puffs into the lungs every 6 hours as needed for Wheezing      furosemide (LASIX) 40 MG tablet Take 20 mg by mouth daily       linagliptin (TRADJENTA) 5 MG tablet Take 5 mg by mouth daily      metoprolol tartrate (LOPRESSOR) 25 MG tablet Take 12.5 mg by mouth 2 times daily 1/2 tab (=12.5mg) BID      pantoprazole (PROTONIX) 40 MG tablet Take 40 mg by mouth daily      famotidine (PEPCID) 40 MG tablet Take 40 mg by mouth nightly      gabapentin (NEURONTIN) 100 MG capsule Take 200 mg by mouth 3 times daily.  modafinil (PROVIGIL) 200 MG tablet Take 200 mg by mouth daily. Indications: 200mg AM and 100mg in afternoon       montelukast (SINGULAIR) 10 MG tablet Take 10 mg by mouth nightly      albuterol (PROVENTIL) (2.5 MG/3ML) 0.083% nebulizer solution Take 2.5 mg by nebulization 3 times daily      modafinil (PROVIGIL) 100 MG tablet Take 100 mg by mouth every evening. Indications: 200mg AM and 100mg in afternoon      QUEtiapine (SEROQUEL) 50 MG tablet Take 50 mg by mouth nightly      apixaban (ELIQUIS) 2.5 MG TABS tablet Take 1 tablet by mouth 2 times daily 60 tablet 0    Cyanocobalamin (B-12) 5000 MCG CAPS Take 5,000 mcg by mouth 2 times daily       tiotropium (SPIRIVA) 18 MCG inhalation capsule Inhale 18 mcg into the lungs daily      isosorbide mononitrate (IMDUR) 30 MG extended release tablet Take 1 tablet by mouth daily 30 tablet 0    fluticasone-vilanterol (BREO ELLIPTA) 100-25 MCG/INH AEPB inhaler Inhale 2 puffs into the lungs daily      melatonin 5 MG TABS tablet Take 5 mg by mouth nightly as needed (sleep)      calcitRIOL (ROCALTROL) 0.25 MCG capsule Take 0.25 mcg by mouth 2 times daily       atorvastatin (LIPITOR) 20 MG tablet TAKE 1 TABLET DAILY 90 tablet 2    carbidopa-levodopa (SINEMET CR)  MG per extended release tablet Take 1 tablet by mouth 4 times daily 360 tablet 3    rOPINIRole (REQUIP) 2 MG tablet Take 1 tablet by mouth 3 times daily 270 tablet 3    amiodarone (CORDARONE) 200 MG tablet Take 200 mg by mouth daily       ferrous sulfate 325 (65 Fe) MG EC tablet Take 1 tablet by mouth 2 times daily (with meals) 90 tablet 3    rasagiline mesylate 1 MG TABS Take 1 tablet by mouth daily      Oxygen Concentrator Dx: Pneumonia, use as directed.  (Patient taking differently: Dx: Pneumonia, use as directed. ON 24/7) 1 each 0       Allergies:   Dye [barium-containing compounds], Pcn [penicillins], and Bactrim [sulfamethoxazole-trimethoprim]    Social History:    reports that she quit smoking about 50 years ago. Her smoking use included cigarettes. She has a 30.00 pack-year smoking history. She has never used smokeless tobacco. She reports previous alcohol use. She reports that she does not use drugs. Family History:    family history includes Heart Disease in her mother; Heart Failure in her mother; High Blood Pressure in her mother; Hypertension in her mother. Review of Systems:     As per history of present illness. All other systems are reviewed and noted to be negative. Physical Exam:   BP (!) 128/43   Pulse 64   Temp 98.4 °F (36.9 °C) (Oral)   Resp 16   Ht 5' 1\" (1.549 m)   Wt 172 lb 6.4 oz (78.2 kg)   LMP 04/03/2004 (Within Years)   SpO2 100%   BMI 32.57 kg/m²     Intake/Output Summary (Last 24 hours) at 3/7/2021 1145  Last data filed at 3/7/2021 0534  Gross per 24 hour   Intake 240 ml   Output 850 ml   Net -610 ml       GENERAL:  Alert, appropriate, oriented, in NAD. HEENT:  Head is atraumatic and normocephalic. No Pallor. No icterus. NECK: Supple without any thyromegaly. LUNGS: Generally decreased breath sounds. CARDIAC: S1, S2, RRR. ABD:  Soft non-tender . EXT: Trace edema. MS: No obvious deformities. SKIN: No obvious skin rashes. NEURO: No clear focal neurologic deficits.     Labs/ Ancillary data:     CBC:   Recent Labs     03/05/21  1215 03/06/21  0639 03/07/21  0246   WBC 5.0 6.2 6.4   HGB 8.8* 8.0* 8.3*   * 105* 113*     BMP:    Recent Labs     03/05/21  1215 03/06/21  0639 03/07/21  0246    144 143   K 5.1 4.4 4.3   CL 98 100 97*   CO2 33* 39* 37*   BUN 43* 39* 39*   CREATININE 2.03* 1.84* 1.98*   GLUCOSE 107* 92 107*     Hepatic:   Recent Labs     03/05/21  1215 03/06/21  0639   AST 15 10   ALT <5* <5*   BILITOT 0.13* 0.12*   ALKPHOS 69 62     Troponin:   Recent Labs     03/07/21  0637   TROPONINT NOT REPORTED     Lipids:   Recent Labs     03/06/21  1755   CHOL 183   HDL 31*     INR:   Recent Labs     03/05/21  1215   INR 1.0       Impression :     Questionable mental status changes-neurology work-up underway. Paroxysmal atrial fibrillation-currently in sinus rhythm. Cannot anticoagulate due to history of severe bleeding in the past on multiple occasions. Anemia. Chronic renal failure. Parkinson's disease with history of falling in the past.    Plan :     Agree with check for orthostatic changes. Continue other cardiac medications. We will follow patient with you. Thank you very much for allowing us to participate in the care of this patient. Please call us with any questions.     Electronically signed by Isabelle Santana MD on 3/7/2021 at 11:45 AM

## 2021-03-07 NOTE — PLAN OF CARE
Problem: Falls - Risk of:  Goal: Will remain free from falls  Description: Will remain free from falls  Outcome: Ongoing  Goal: Absence of physical injury  Description: Absence of physical injury  Outcome: Ongoing     Problem: Skin Integrity:  Goal: Will show no infection signs and symptoms  Description: Will show no infection signs and symptoms  Outcome: Ongoing  Goal: Absence of new skin breakdown  Description: Absence of new skin breakdown  Outcome: Ongoing     Problem: Discharge Planning:  Goal: Discharged to appropriate level of care  Description: Discharged to appropriate level of care  Outcome: Ongoing     Problem: Serum Glucose Level - Abnormal:  Goal: Ability to maintain appropriate glucose levels will improve  Description: Ability to maintain appropriate glucose levels will improve  Outcome: Ongoing     Problem: Sensory Perception - Impaired:  Goal: Ability to maintain a stable neurologic state will improve  Description: Ability to maintain a stable neurologic state will improve  Outcome: Ongoing     Problem: Mental Status - Impaired:  Goal: Mental status will improve  Description: Mental status will improve  Outcome: Ongoing     Problem:  Activity:  Goal: Ability to tolerate increased activity will improve  Description: Ability to tolerate increased activity will improve  Outcome: Ongoing     Problem: ACTIVITY INTOLERANCE/IMPAIRED MOBILITY  Goal: Mobility/activity is maintained at optimum level for patient  Outcome: Ongoing     Problem: Pain:  Goal: Pain level will decrease  Description: Pain level will decrease  Outcome: Ongoing  Goal: Control of acute pain  Description: Control of acute pain  Outcome: Ongoing  Goal: Control of chronic pain  Description: Control of chronic pain  Outcome: Ongoing

## 2021-03-07 NOTE — PROGRESS NOTES
Physical Therapy  Facility/Department: [facility]  Daily Treatment Note  NAME: Jemma Mccoy  : 1945  MRN: [id number]    Date of Service: 3/7/2021    Discharge Recommendations:  [address]       RN reports patient is medically stable for therapy treatment this date. Chart reviewed prior to treatment and patient is agreeable for therapy. All lines intact and patient positioned comfortably at end of treatment. All patient needs addressed prior to ending therapy session. Assessment   Body structures, Functions, Activity limitations: Decreased functional mobility ; Decreased balance;Decreased endurance;Decreased ROM; Decreased strength;Decreased posture  Assessment: Pt. with complex medical history and presenting with Rt. UE and LE weakness that spouse is saying is NOT from her CVA x 3 in past but from UE humerus fx one yr. ago and a \"complication with heart cath. \" for Rt. LE.  Recommending SNF at discharge. Specific instructions for Next Treatment: progress ambulate as tolerated and safe  Prognosis: Good  Decision Making: High Complexity  PT Education: Transfer Training;Equipment;PT Role;Energy Conservation; Functional Mobility Training;Plan of Care;General Safety; Family Education;Gait Training  REQUIRES PT FOLLOW UP: Yes  Activity Tolerance  Activity Tolerance: Patient limited by fatigue;Treatment limited secondary to medical complications (free text)(PD)  Activity Tolerance: Pt becomes quickly fatigued with minimal exertion. Patient Diagnosis(es): The primary encounter diagnosis was Altered mental status, unspecified altered mental status type. A diagnosis of Elevated troponin was also pertinent to this visit.      has a past medical history of Acute on chronic diastolic congestive heart failure ([facility] Anesthesia complication, Asthma, Atrial fibrillation ([facility] Cataracts, bilateral, Cellulitis, Cerebral artery occlusion with cerebral infarction [facility]), CHF (congestive heart failure) ([facility] Chronic kidney disease, Chronic kidney disease, Closed fracture of proximal end of right humerus with routine healing, Constipation, Controlled type 2 diabetes mellitus with stage 3 chronic kidney disease, without long-term current use of insulin ([address] COPD (chronic obstructive pulmonary disease) ([facility] Difficult intravenous access, Difficulty walking, Diverticulosis, DM2 (diabetes mellitus, type 2) ([facility] Full dentures, GERD (gastroesophageal reflux disease), H/O fall, Headache(784.0), [place] (hard of hearing), Hyperlipidemia, Hyperplastic polyp of intestine, Hypertension, Impaired ambulation, Kidney stone, Living in nursing home, Morbid obesity with BMI of 40.0-44.9, adult ([address] Muscle weakness, Nephrolithiasis, Open wound, ECTOR on CPAP, Osteoarthritis, Parkinson disease ([facility] Restless leg syndrome, Spinal stenosis in cervical region, Type II or unspecified type diabetes mellitus without mention of complication, not stated as uncontrolled, Urethral caruncle, Wears glasses, and Wears glasses. has a past surgical history that includes Cervical disc surgery (2012); Appendectomy; Tonsillectomy and adenoidectomy (1953); hernia repair (1999); eye surgery (Bilateral, 2017); Cervical spine surgery (5/31/13); laminectomy (05/31/2013); Upper gastrointestinal endoscopy (12/28/2016); Colonoscopy (2009); Colonoscopy (12/29/2016); Colonoscopy (12/30/2016); Colonoscopy (04/25/2017); pr colsc flx w/removal lesion by hot bx forceps (N/A, 4/25/2017); Cystorrhaphy (04/04/2018); pr cystourethroscopy,biopsies (N/A, 4/4/2018); pr [facility] incl fluor gdnce dx w/cell washg spx (N/A, 6/5/2018); Upper gastrointestinal endoscopy (N/A, 8/29/2018); shoulder fracture surgery (Right, 5/28/2019); Hardware Removal (Right, 07/05/2019); Hardware Removal (Right, 7/5/2019); Cystoscopy (N/A, 10/28/2020); and Cystoscopy (N/A, 10/30/2020).     Restrictions  Restrictions/Precautions  Restrictions/Precautions: General Precautions, Fall Risk, Up as Tolerated, Contact Precautions  Position Activity Restriction  Other position/activity restrictions: fall history, ALARMS, up with assist  Subjective   General  Chart Reviewed: Yes  Additional Pertinent Hx: CHF, CVA x 3, DM, HTN, OA, Parkinsons  Family / Caregiver Present: Yes()  Subjective  Subjective: Pt denies pain this date. Orientation  Orientation  Overall Orientation Status: Within Functional Limits  Cognition   Cognition  Overall Cognitive Status: Exceptions  Arousal/Alertness: Delayed responses to stimuli  Following Commands: Follows all commands without difficulty  Attention Span: Attends with cues to redirect  Memory: Appears intact  Problem Solving: Assistance required to generate solutions;Assistance required to implement solutions  Insights: Decreased awareness of deficits  Initiation: Requires cues for all  Sequencing: Requires cues for all  Objective   Bed mobility  Supine to Sit: Moderate assistance;2 Person assistance  Sit to Supine: Unable to assess(Pt up to recliner)  Scootin Person assistance;Maximal assistance;Dependent/Total  Comment: HOB elevated, cues for sequencing/proper hand placement with supine>sit. Pt required assist with trunk and BLEs. Pt unable to scoot self towards EOB, required maxAx2. Transfers  Sit to Stand: Moderate Assistance  Stand to sit: Moderate Assistance  Bed to Chair: Moderate assistance;2 Person Assistance  Comment: Cues for proper hand placement with sit>stand and RW. Pt unable to place RUE on RW due to pain in shoulder. Pt was able to place on RW once standing. Upon standing, pt demo'd posterior lean required assist to correct. Ambulation  Ambulation?: Yes  More Ambulation?: No  Ambulation 1  Surface: level tile  Device: Rolling Walker  Other Apparatus: O2  Assistance:  Moderate assistance;2 Person assistance  Quality of Gait: shuffles, increased time to complete, increased time for initiation, forward trunk/head  Gait Deviations: Slow Trena;Decreased step length;Decreased step height;Shuffles  Distance: 4 steps from bed>chair  Comments: Pt very shakey with transfer, required modAx2 for safety and advancement of RW. MAX cues for squaring up prior to sitting to decrease fall risk. Balance  Posture: Poor  Sitting - Static: Fair(Pt. fearful of falling sitting unsupporting EOB)  Sitting - Dynamic: Poor  Standing - Static: Poor(BUE support from RW)  Comments: Heavy use of UEs on RW during standing, pt very shakey with poor posture during stands. OutComes Score   AM-PAC Score  AM-PAC Inpatient Mobility Raw Score : 11 (03/07/21 1232)  AM-PAC Inpatient T-Scale Score : 33.86 (03/07/21 1232)  Mobility Inpatient CMS 0-100% Score: 72.57 (03/07/21 1232)  Mobility Inpatient CMS G-Code Modifier : CL (03/07/21 1232)          Goals  Short term goals  Time Frame for Short term goals: 12 visits:  Short term goal 1: Pt. to require mod A X1 for bed mob., rolling to Rt. and utilizing bedrail with LUE (as pt. is set up at home)  Short term goal 2: Pt. to have good- static sitting balance/  fair- dynamic sitting balance  Short term goal 3: Pt. to require mod A for sit to stand transfer with RW  Short term goal 4: Pt. to transfer bed to chair with RW and mod A  Short term goal 5: Pt. to tolerate 25+ min. of PT for ther ex/ gait/ balance training daily  Patient Goals   Patient goals : ambulate    Plan    Plan  Times per week: 1-2x/day; 5-6days/wk  Specific instructions for Next Treatment: progress ambulate as tolerated and safe  Current Treatment Recommendations: Strengthening, Transfer Training, ROM, Balance Training, Functional Mobility Training, Gait Training, Safety Education & Training, Endurance Training, Neuromuscular Re-education, Home Exercise Program, Positioning, Patient/Caregiver Education & Training  Safety Devices  Type of devices:  All fall risk precautions in place, Gait belt, Patient at risk for falls, Call light within reach, Nurse notified, Left in chair, Chair alarm in place     Therapy Time   Individual Concurrent Group Co-treatment   Time In 1112         Time Out 1142         Minutes 27             Co-treatment with OT warranted secondary to decreased safety and independence requiring 2 skilled therapy professionals to address individual discipline's goals. PT addressing pre gait trunk strengthening, weight shifting prior to transfers, transfer training and postural control in sitting.          Humble Cortez, PT

## 2021-03-07 NOTE — PLAN OF CARE
Problem: Falls - Risk of:  Goal: Will remain free from falls  Description: Will remain free from falls  3/7/2021 0039 by Patricia Coronel RN  Outcome: Ongoing     Problem: Skin Integrity:  Goal: Absence of new skin breakdown  Description: Absence of new skin breakdown  3/7/2021 0039 by Patricia oCronel RN  Outcome: Ongoing     Problem: Mental Status - Impaired:  Goal: Mental status will improve  Description: Mental status will improve  3/7/2021 0039 by Patricia Coronel RN  Outcome: Ongoing     Problem: Pain:  Goal: Pain level will decrease  Description: Pain level will decrease  Outcome: Ongoing

## 2021-03-07 NOTE — PROGRESS NOTES
Subjective:     Follow-up type 2 diabetes  No polyuria no polydipsia no hypoglycemia blood sugars reviewed ROS  Fever no chills no GI/ complaints no cardiopulmonary complaints except shortness of breath with any exertion, no TIA no bleeding no headache no sore throat no skin lesions, complaining of fatigue and weakness  physical exam  General Appearance: in no acute distress and alert  Skin: warm and dry, no rash or erythema  Head: normocephalic and atraumatic  Eyes: pupils equal, round, and reactive to light and sclera anicteric  Neck: neck supple and non tender without mass   Pulmonary/Chest: clear to auscultation bilaterally- no wheezes, rales or rhonchi, normal air movement, no respiratory distress  Cardiovascular: normal rate, regular rhythm, normal S1 and S2, no gallops, intact distal pulses and no carotid bruits  Abdomen: soft, non-tender, non-distended, normal bowel sounds, no masses or organomegaly  Extremities: no edema and good pulses no Homans' sign  Neurologic: Oriented x3 mild weakness left side    BP (!) 120/46   Pulse 70   Temp 98.1 °F (36.7 °C) (Oral)   Resp 15   Ht 5' 1\" (1.549 m)   Wt 172 lb 6.4 oz (78.2 kg)   LMP 04/03/2004 (Within Years)   SpO2 97%   BMI 32.57 kg/m²     CBC:   Lab Results   Component Value Date    WBC 6.4 03/07/2021    RBC 2.45 03/07/2021    HGB 8.3 03/07/2021    HCT 28.7 03/07/2021    .1 03/07/2021    MCH 33.9 03/07/2021    MCHC 28.9 03/07/2021    RDW 14.6 03/07/2021     03/07/2021    MPV 10.6 03/07/2021     BMP:    Lab Results   Component Value Date     03/07/2021    K 4.3 03/07/2021    CL 97 03/07/2021    CO2 37 03/07/2021    BUN 39 03/07/2021    LABALBU 3.5 03/06/2021    LABALBU Detected 11/30/2018    CREATININE 1.98 03/07/2021    CALCIUM 9.3 03/07/2021    GFRAA 30 03/07/2021    LABGLOM 25 03/07/2021    GLUCOSE 107 03/07/2021    GLUCOSE 132 03/07/2012        Assessment:  Patient Active Problem List   Diagnosis    Controlled type 2 diabetes mellitus with stage 3 chronic kidney disease, without long-term current use of insulin (HCC)    Hyperlipidemia    Essential hypertension    Chronic leg pain    Paroxysmal atrial fibrillation (HCC)    Asthma    Gastroesophageal reflux disease without esophagitis    ECTOR on CPAP    Type 2 diabetes mellitus with complication, without long-term current use of insulin (HCC)    Spinal stenosis in cervical region    Hypomagnesemia    Hyperphosphaturia    Hypocalcemia    Parkinson's disease ([address]    Acute cystitis with hematuria    Altered mental status    Iron deficiency anemia due to chronic blood loss    Morbid obesity with BMI of 40.0-44.9, adult (HCC)    Hyperplastic polyp of intestine    Diverticulosis    Panlobular emphysema (HCC)    Rapid atrial fibrillation (HCC)    CKD (chronic kidney disease) stage 3, GFR 30-59 ml/min    Anemia in chronic kidney disease    Chronic respiratory failure with hypoxia and hypercapnia (HCC)    Acute kidney injury superimposed on chronic kidney disease (HCC)    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 coli UTI (urinary tract infection)    Nephrolithiasis    Candidal intertrigo    Venous insufficiency    Malabsorption    Anemia of chronic renal failure, stage 4 (severe) (HCC)    Iron deficiency    Coronary atherosclerosis    COPD exacerbation (HCC)    Restless leg syndrome    SIRS (systemic inflammatory response syndrome) (HCC)    Nausea and vomiting in adult    Bacterial UTI    Odynophagia    Esophageal dysphagia    Candida esophagitis (HCC)    Sepsis due to urinary tract infection (HCC)    Chronic respiratory failure with hypoxia (HCC)    Chronic diastolic congestive heart failure (HCC)    History of ESBL E. coli infection    Stage 4 chronic kidney disease (HCC)    Fever    Macrocytic anemia    Hypercalcemia    Monoclonal gammopathy of undetermined significance    Acute nonintractable headache    Anemia of chronic renal failure, stage 4 (severe) (HCC)    Traumatic closed displaced fracture of proximal end of right humerus, initial encounter    Urinary tract infection without hematuria    Metabolic encephalopathy    Acute kidney injury [facility]    Right leg swelling    Hematuria    Chest pain, unspecified    Chronic obstructive pulmonary disease with (acute) exacerbation (HCC)    Altered mental state    Gait abnormality    Falling    Postural dizziness     Right CVA  Type 2 diabetes with renal complications  CKD 3B  Recent UTI cultures were done outpatient which came back yesterday negative will DC  Cipro recheck UA  Paroxysmal atrial fibrillation continue with the Eliquis  Chronic respiratory failure with hypoxia  Chronic anticoagulation  Chronic diastolic CHF  Parkinson's  Obesity BMI of 33 4.8  Plan:    Meds labs reviewed await MRI of the brain 2D echo carotid ultrasound continue with physical therapy occupational therapy continue with the Eliquis, avoid nephrotoxic drugs, check before meals and at bedtime Accu-Cheks with insulin coverage social service for discharge planning to SNF      Cisco Rogers MD  4:11 PM

## 2021-03-07 NOTE — DISCHARGE INSTR - COC
Continuity of Care Form    Patient Name: Rhoda Adam   :  1945  MRN:  [id number]    Admit date:  3/5/2021  Discharge date:  3/9/21    Code Status Order: Full Code   Advance Directives:   [facility] Documentation       Date/Time Healthcare Directive Type of Healthcare Directive Copy in [address] [facility] [address] Agent's Name Healthcare Agent's Phone Number    [phone]  Yes, patient has an advance directive for healthcare treatment  Living will;Health care treatment directive  No, copy requested from family  Patrizia Physician:  Donna Cowart MD  PCP: Donna Cowart MD    Discharging Nurse: [facility] Unit/Room#:   Discharging Unit Phone Number: [phone]    Emergency Contact:   Extended Emergency Contact Information  Primary Emergency Contact: Sebas Marion  Address: [address] [place] [address] Phone: [phone]  Mobile Phone: [phone]  Relation: Spouse   needed? No  Secondary Emergency Contact: Oskar Markham [address] Phone: [phone]  Work Phone: [phone]  Mobile Phone: [phone]  Relation: Other   needed?  No    Past Surgical History:  Past Surgical History:   Procedure Laterality Date    APPENDECTOMY      CERVICAL [address] SURGERY  2012    CERVICAL SPINE SURGERY  13    posterior [id number]    COLONOSCOPY      COLONOSCOPY  2016    incomplete was not cleaned out    COLONOSCOPY  2016    COLONOSCOPY  2017     SIGMOID COLON POLYPECTOMY:  HYPERPLASTIC POLYP ,   DIVERTICULOSIS    CYSTORRHAPHY  2018    CYSTOSCOPY N/A 10/28/2020    CYSTOSCOPY performed by Bisi Braun MD at [address] [place] [address] 10/30/2020    CYSTOSCOPY PYELOGRAM WITH HOLMIUM LASER RIGHT STENT INSERTION performed by Bisi Braun MD at Health system 2017    [address]    HARDWARE REMOVAL Right 07/05/2019    HARDWARE REMOVAL PINS  HUMERUS     HARDWARE REMOVAL Right 7/5/2019    HARDWARE REMOVAL PINS  HUMERUS  C-ARM performed by Geraldine Davenport MD at [address]  05/31/2013    Dr. Lux Castelan DX W/CELL [address] N/A 6/5/2018    BRONCHOSCOPY performed by August Dubois MD at [address] N/A 4/25/2017    COLONOSCOPY POLYPECTOMY HOT BIOPSY performed by Zuhair Thayer MD at [facility] N/A 4/4/2018    CYSTOSCOPY performed by Jovi Chamberlain MD at [address] Right 5/28/2019    SHOULDER CLOSED REDUCTION PERCUTANEOUS PINNING RIGHT performed by Geraldine Davenport MD at [place] [address]  12/28/2016    gastritis, esophagitis,     UPPER GASTROINTESTINAL ENDOSCOPY N/A 8/29/2018    EGD BIOPSY performed by Zuhair Thayer MD at [facility] History:   Immunization History   Administered Date(s) Administered    FLUZONE 3 YEARS AND OVER 10/25/2015    Influenza Virus Vaccine 12/15/2013, 10/25/2014    Influenza, High Dose (Fluzone 65 yrs and older) 09/15/2016, 08/29/2017    Influenza, Mari Ezequiel, 6 mo and older, IM, PF (Flulaval, Fluarix) 10/07/2018    Pneumococcal Conjugate 13-valent ([id number]) 01/23/2014    Pneumococcal Polysaccharide ([id number]) 12/23/2014    Zoster Live (Zostavax) 07/18/2017       Active Problems:  Patient Active Problem List   Diagnosis Code    Controlled type 2 diabetes mellitus with stage 3 chronic kidney disease, without long-term current use of insulin (HCC) E11.22, N18.30    Hyperlipidemia E78.5    Essential hypertension I10    Chronic leg pain M79.606, G89.29    Paroxysmal atrial fibrillation (HCC) I48.0    Asthma J45.909    Gastroesophageal reflux disease without esophagitis K21.9    ECTOR on CPAP G47.33, Z99.89    Type 2 Acute nonintractable headache R51.9    Anemia of chronic renal failure, stage 4 (severe) ([facility]) N18.4, D63.1    Traumatic closed displaced fracture of proximal end of right humerus, initial encounter S42.201A    Urinary tract infection without hematuria U29.8    Metabolic encephalopathy G89.45    Acute kidney injury ([facility] N17.9    Right leg swelling M79.89    Hematuria R31.9    Chest pain, unspecified R07.9    Chronic obstructive pulmonary disease with (acute) exacerbation ([facility]) J44.1    Altered mental state R41.82    Gait abnormality R26.9    Falling R29.6    Postural dizziness R42       Isolation/Infection:   Isolation            Contact          Patient Infection Status       Infection Onset Added Last Indicated Last Indicated By Review Planned Expiration Resolved Resolved By    ESBL (Extended Spectrum Beta Lactamase)  03/10/18 10/25/19 Urine culture via catheter        E.  Coli - urine 2019      Resolved    COVID-19 Rule Out 21 COVID-19, Rapid (Ordered)   21 Rule-Out Test Resulted    COVID-19 Rule Out 20 COVID-19 (Ordered)   20     COVID-19 Rule Out 20 COVID-19 (Ordered)   20 Rule-Out Test Resulted    COVID-19 Rule Out 10/22/20 10/22/20 10/22/20 COVID-19 (Ordered)   10/22/20 Rule-Out Test Resulted    COVID-19 Rule Out 08/14/20 08/14/20 08/15/20 Covid-19 Ambulatory (Ordered)   20 Rule-Out Test Resulted            Nurse Assessment:  Last Vital Signs: BP (!) 128/43   Pulse 64   Temp 98.4 °F (36.9 °C) (Oral)   Resp 16   Ht 5' 1\" (1.549 m)   Wt 172 lb 6.4 oz (78.2 kg)   LMP 2004 (Within Years)   SpO2 100%   BMI 32.57 kg/m²     Last documented pain score (0-10 scale): Pain Level: 5  Last Weight:   Wt Readings from Last 1 Encounters:   21 172 lb 6.4 oz (78.2 kg)     Mental Status:  oriented and alert    IV Access:  - None    Nursing Mobility/ADLs:  Walking   Assisted juancho braden Transfer  Assisted  Bathing  Assisted  Dressing  Assisted  Toileting  Assisted briefed with [address]  Assisted  Med Delivery   whole    Wound Care Documentation and Therapy:        Elimination:  Continence:   · Bowel: Yes  · Bladder: Yes  Urinary Catheter: None   Colostomy/Ileostomy/Ileal Conduit: No       Date of Last BM: 3/9/21    Intake/Output Summary (Last 24 hours) at 3/7/2021 0952  Last data filed at 3/7/2021 0534  Gross per 24 hour   Intake 240 ml   Output 850 ml   Net -610 ml     I/O last 3 completed shifts: In: 240 [P.O.:240]  Out: 850 [Urine:850]    Safety Concerns:    History of Falls (last 30 days)    Impairments/Disabilities:      Speech slurred at times from previous stroke     Nutrition Therapy:  Current Nutrition Therapy:   - Oral Diet:  Cardiac    Routes of Feeding: Oral  Liquids: No Restrictions  Daily Fluid Restriction: no  Last Modified Barium Swallow with Video (Video Swallowing Test): not done    Treatments at the Time of Hospital Discharge:   Respiratory Treatments:   Oxygen Therapy:  is on oxygen at 2 L/min per nasal cannula.   Ventilator:    - CPAP   only when sleeping, device from home and with 2 L/min oxygen    Rehab Therapies: Physical Therapy and Occupational Therapy  Weight Bearing Status/Restrictions: No weight bearing restirctions  Other Medical Equipment (for information only, NOT a DME order):  walker and juancho steady  Other Treatments: ***    Patient's personal belongings (please select all that are sent with patient):  Glasses, home medications, clothing CPAP, I Pad    RN SIGNATURE:  Electronically signed by Timmy Scott RN on 3/9/21 at 5:55 PM EST    CASE MANAGEMENT/SOCIAL WORK SECTION    Inpatient Status Date: ***    Readmission Risk Assessment Score:  Readmission Risk              Risk of Unplanned Readmission:        35           Discharging to Facility/ Agency    Name: Name: [facility]    Address: [address] [place] [address] [place] pay[place], [facility] Phone:  [phone]  · Fax:   ·     / signature: Electronically signed by NILDA Davis on 3/9/21 at 4:50 PM EST    PHYSICIAN SECTION    Prognosis: Fair    Condition at Discharge: Stable    Rehab Potential (if transferring to Rehab): Fair    Recommended Labs or Other Treatments After Discharge: bmp cbc 3days    Physician Certification: I certify the above information and transfer of [facility]  is necessary for the continuing treatment of the diagnosis listed and that she requires [facility] for less 30 days.      Update Admission H&P: No change in H&P    PHYSICIAN SIGNATURE:  Electronically signed by Ann Montero MD on 3/9/21 at 5:47 PM EST

## 2021-03-08 NOTE — PLAN OF CARE
Problem: Falls - Risk of:  Goal: Will remain free from falls  Description: Will remain free from falls  3/8/2021 0100 by Donna Cowart RN  Outcome: Ongoing    Problem: Serum Glucose Level - Abnormal:  Goal: Ability to maintain appropriate glucose levels will improve  Description: Ability to maintain appropriate glucose levels will improve  3/8/2021 0100 by Donna Cowart RN  Outcome: Ongoing     Problem: Sensory Perception - Impaired:  Goal: Ability to maintain a stable neurologic state will improve  Description: Ability to maintain a stable neurologic state will improve  3/8/2021 0100 by Donna Cowart RN  Outcome: Ongoing     Problem: Mental Status - Impaired:  Goal: Mental status will improve  Description: Mental status will improve  3/8/2021 0100 by Donna Cowart RN  Outcome: Ongoing     Problem:  Activity:  Goal: Ability to tolerate increased activity will improve  Description: Ability to tolerate increased activity will improve  3/8/2021 0100 by Donna Cowart RN  Outcome: Ongoing     Problem: Pain:  Goal: Pain level will decrease  Description: Pain level will decrease  3/8/2021 0100 by Donna Cowart RN  Outcome: Ongoing

## 2021-03-08 NOTE — PROGRESS NOTES
Drug interaction - QTc concern    Dear Provider(s):  A drug interaction exists between cipro and amiodarone. The interaction is a potential additive QT prolongation. Please continue to monitor your patient's cardiac rhythm as you see appropriate. General risk factors for torsades de pointes  Hospitalization  Advanced Age   Female gender (2-fold risk)  Heart disease  Long Qt interval syndrome  QTc > 500 ms (2-3-fold risk)  Renal or hepatic insufficiency  Hypokalemia  Hypomagnesemia  Hypocalcemia  Diuretic use  Bradycardia  Use of more than one QT-prolonging drug  Rapid IV administration of select medicatrions      If pharmacy can be of any assistance to review the patients medications and offer alternative suggestions, please do not hesitate to contact the pharmacy at [phone]. Thank you.   Sonja Hubbard  Pharmacist, 7:18 AM

## 2021-03-08 NOTE — FLOWSHEET NOTE
Writer attempted follow up visit for pt. Pt sleeping at time of attempted visit and no family present, though writer did observe spouse at hospital in the morning. Chaplains will remain available to offer spiritual and emotional support as needed.        03/08/21 1658   Encounter Summary   Services provided to: Patient   Referral/Consult From: Gustavo Hubbard Visiting   (3/8/21 sleeping)   Complexity of Encounter Low   Length of Encounter 15 minutes   Routine   Type Follow up   Assessment Sleeping   Intervention Prayer     Electronically signed by Toño Lopez on 3/8/2021 at 4:59 PM.  [address] [facility]  [phone]

## 2021-03-08 NOTE — PROGRESS NOTES
DATE: 3/8/2021    NAME: Matthew Casillas  MRN: [id number]   : 1945    Patient not seen this date for Physical Therapy due to:  [] Blood transfusion in progress  [] Cancel by RN  [] Hemodialysis  []  Refusal by Patient   [] Spine Precautions   [] Strict Bedrest  [] Surgery  [] Testing      [x] Other at 13:50, pt in bed and  reports pt recently given medication that is making her sleepy. Pt aware that she is very sleepy and stated \"I can't keep my eyes open! \" Pt to sleepy to functionally participate in PT at this time         [] PT being discontinued at this time. Patient independent. No further needs. [] PT being discontinued at this time as the patient has been transferred to hospice care. No further needs.     NORI CHRISTIE, PTA

## 2021-03-08 NOTE — PROGRESS NOTES
Subjective:     Follow-up type 2 diabetes  Polyuria no polydipsia no hypoglycemia blood sugars reviewed  ROS  Fever no chills no GI/ complaints no cardiopulmonary complaints no TIA no bleeding no headache no sore throat no skin lesions no fatigue physical exam  General Appearance: in no acute distress and alert  Skin: warm and dry, no rash or erythema  Head: normocephalic and atraumatic  Eyes: pupils equal, round, and reactive to light and sclera anicteric    Neck: neck supple and non tender without mass   Pulmonary/Chest: clear to auscultation bilaterally- no wheezes, rales or rhonchi, normal air movement, no respiratory distress  Cardiovascular: normal rate, regular rhythm, normal S1 and S2, no gallops, intact distal pulses and no carotid bruits  Abdomen: soft, non-tender, non-distended, normal bowel sounds, no masses or organomegaly  Extremities: no edema and good pulses no Homans  Neurologic: Alert oriented x3 no focal deficit  BP (!) 139/51   Pulse 87   Temp 98 °F (36.7 °C) (Oral)   Resp 18   Ht 5' 1\" (1.549 m)   Wt 174 lb 9.6 oz (79.2 kg)   LMP 04/03/2004 (Within Years)   SpO2 93%   BMI 32.99 kg/m²     CBC:   Lab Results   Component Value Date    WBC 5.2 03/08/2021    RBC 2.37 03/08/2021    HGB 8.0 03/08/2021    HCT 27.9 03/08/2021    .7 03/08/2021    MCH 33.8 03/08/2021    MCHC 28.7 03/08/2021    RDW 14.9 03/08/2021    PLT 83 03/08/2021    MPV 10.4 03/08/2021     BMP:    Lab Results   Component Value Date     03/08/2021    K 4.5 03/08/2021    CL 96 03/08/2021    CO2 42 03/08/2021    BUN 37 03/08/2021    LABALBU 3.5 03/06/2021    LABALBU Detected 11/30/2018    CREATININE 1.79 03/08/2021    CALCIUM 8.9 03/08/2021    GFRAA 34 03/08/2021    LABGLOM 28 03/08/2021    GLUCOSE 92 03/08/2021    GLUCOSE 132 03/07/2012        Assessment:  Patient Active Problem List   Diagnosis    Controlled type 2 diabetes mellitus with stage 3 chronic kidney disease, without long-term current use of insulin ([address]    Hyperlipidemia    Essential hypertension    Chronic leg pain    Paroxysmal atrial fibrillation (HCC)    Asthma    Gastroesophageal reflux disease without esophagitis    ECTOR on CPAP    Type 2 diabetes mellitus with complication, without long-term current use of insulin (HCC)    Spinal stenosis in cervical region    Hypomagnesemia    Hyperphosphaturia    Hypocalcemia    Parkinson's disease ([facility]    Acute cystitis with hematuria    Altered mental status    Iron deficiency anemia due to chronic blood loss    Morbid obesity with BMI of 40.0-44.9, adult (HCC)    Hyperplastic polyp of intestine    Diverticulosis    Panlobular emphysema (HCC)    Rapid atrial fibrillation (HCC)    CKD (chronic kidney disease) stage 3, GFR 30-59 ml/min    Anemia in chronic kidney disease    Chronic respiratory failure with hypoxia and hypercapnia (HCC)    Acute kidney injury superimposed on chronic kidney disease (HCC)    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 coli UTI (urinary tract infection)    Nephrolithiasis    Candidal intertrigo    Venous insufficiency    Malabsorption    An[facility] of [facility] failure, [facility] 4 (severe) [facility]    Iron deficiency    Coronary atherosclerosis    COPD exacerbation (HCC)    Restless leg syndrome    SIRS (systemic inflammatory response syndrome) ([facility])    Nausea and vomiting in adult    Bacterial UTI    Odynophagia    Esophageal dysphagia    Candida esophagitis (HCC)    Sepsis due to urinary tract infection (HCC)    Chronic respiratory failure with hypoxia (HCC)    Chronic diastolic congestive heart failure (HCC)    History of ESBL E. coli infection    Stage 4 chronic kidney disease (HCC)    Fever    Macrocytic anemia    Hypercalcemia    Monoclonal gammopathy of undetermined significance    Acute nonintractable headache    Anemia of chronic renal failure, stage 4 (severe) (HCC)    Traumatic closed displaced fracture of proximal end of right humerus, initial encounter    Urinary tract infection without hematuria    Metabolic encephalopathy    Acute [facility]    Right leg swelling    Hematuria    Chest pain, unspecified    Chronic obstructive pulmonary disease with (acute) exacerbation (HCC)    Altered mental state    Gait abnormality    Falling    Postural dizziness     Right CVA  Type 2 diabetes with renal complications  CKD 3B  Paroxysmal atrial fibrillation  Chronic respiratory failure with hypoxia  Chronic anticoagulation  Chronic diastolic CHF  Parkinson's  Obesity BMI of 30-34.9  Plan:    Labs reviewed continue with before meals and at bedtime Accu-Cheks with insulin coverage continue with physical therapy occupational therapy avoid nephrotoxic drugs we will discharge tomorrow to skilled nursing facility if okay with everyone pre-CERT is done      Aron Mcdonald MD  6:55 PM

## 2021-03-08 NOTE — PROGRESS NOTES
Occupational Therapy    DATE: 3/8/2021    NAME: Tova Mercado  MRN: [id number]   : 1945    [address]  Occupational Therapy Not Seen Note    Patient not available for Occupational Therapy due to:    [x] Testing: Pt at MRI before breakfast    [] Hemodialysis    [] Cancelled by RN:    []Refusal by Patient:    [] Surgery:     [] Intubation:     [x] Medication: Attempted again at 13:50, pt in bed and  reports pt recently given medication that is making her sleepy. Pt aware that she is very sleepy and stated \"I can't keep my eyes open! \" Pt to sleepy to functionally participate in OT at this time, will cont to follow. [] Sedation:     [] Spine Precautions :    [] Medical Instability:    [x] Other: pt eating breakfast at 9:15 after returning from MRI, will check back later.     ELIZABETH White

## 2021-03-08 NOTE — PLAN OF CARE
Problem: Falls - Risk of:  Goal: Will remain free from falls  Description: Will remain free from falls  Outcome: Ongoing  Note: Bed in lowest position, call light within reach, hourly rounding , bed alarm

## 2021-03-08 NOTE — PROGRESS NOTES
Pulmonary Critical Care Progress Note  Dr. Amanda Zamorano M.D. Patient seen for the follow up of chronic hypoxic/hypercarbic respiratory failure, obstructive sleep apnea, hypersomnia, COPD    Subjective:  She is resting in bed in no distress. RN and her  are present. She denies significant shortness of breath, denies cough or chest pain. She did not sleep much last night, or BiPAP for very brief period of time. Length of stay: 2 Days    Examination:    Vitals: BP (!) 139/51   Pulse 87   Temp 98 °F (36.7 °C) (Oral)   Resp 18   Ht 5' 1\" (1.549 m)   Wt 174 lb 9.6 oz (79.2 kg)   LMP 04/03/2004 (Within Years)   SpO2 93%   BMI 32.99 kg/m²     General appearance: alert and cooperative with exam, no distress, sleepy  Neck: No JVD  Lungs: Moderate/decreased air exchange with no wheeze  Heart: regular rate and rhythm, S1, S2 normal, no gallop  Abdomen: Soft, non tender, + BS  Extremities: no cyanosis or clubbing.  No significant edema    LABs:  CBC:   Recent Labs     03/07/21  0246 03/08/21  0636   WBC 6.4 5.2   HGB 8.3* 8.0*   HCT 28.7* 27.9*   * 83*     BMP:   Recent Labs     03/07/21  0246 03/08/21  0636    144   K 4.3 4.5   CO2 37* 42*   BUN 39* 37*   CREATININE 1.98* 1.79*   LABGLOM 25* 28*   GLUCOSE 107* 92     PT/INR:   Recent Labs     03/05/21  1215   PROTIME 13.4   INR 1.0     APTT:  Recent Labs     03/05/21  1215   APTT 28.6     LIVER PROFILE:  Recent Labs     03/05/21  1215 03/06/21  0639   AST 15 10   ALT <5* <5*   LABALBU 4.0 3.5     Impression:  · Chronic hypoxic/hypercarbic respiratory failure  · Obstructive sleep apnea/Obesity, on home CPAP therapy  · Hypersomnia  · COPD/former smoker with 30-pack-year smoking history  · Generalized weakness/altered mental status  · Chronic diastolic heart failure  · Elevated troponin  · DM, A. fib, CKD, GERD, HTN, Parkinson's  · Acute microinfarct right mid corona radiata    Recommendations:  · Oxygen via nasal cannula, keep SPO2 90% or greater  · Home CPAP while asleep and as needed during the dayencourage use  · Albuterol Q 4 hours and prn  · Symbicort  · Spiriva Respimat  · Continue modafinil  · Incentive spirometry every hour while awake  · Neurology following  · Labs: CBC and BMP in am  · DVT prophylaxis, on Eliquis  · Discussed with patient and   · Discussed with RN  · Will follow with you    Electronically signed by     Obed Allen MD on 3/8/2021 at 12:07 PM  Pulmonary Critical Care and Sleep Medicine,  [facility]  Cell: [phone]  Office: [phone]

## 2021-03-09 NOTE — PROGRESS NOTES
Subjective: Follow-up type 2 diabetes  No polyuria no polydipsia no hypoglycemia blood sugars reviewed  ROS  Fever no chills no GI/ complaints no cardiopulmonary complaints no TIA no bleeding no headache no sore throat no skin lesions no fatigue no bruising  physical exam  General Appearance: in no acute distress and alert  Skin: warm and dry, no rash or erythema  Head: normocephalic and atraumatic  Eyes: pupils equal, round, and reactive to light and sclera anicteric  Neck: neck supple and non tender without mass   Pulmonary/Chest: clear to auscultation bilaterally- no wheezes, rales or rhonchi, normal air movement, no respiratory distress  Cardiovascular: normal rate, regular rhythm, normal S1 and S2, no gallops, intact distal pulses and no carotid bruits  Abdomen: soft, non-tender, non-distended, normal bowel sounds, no masses or organomegaly  Extremities: no edema and good pulses no Homans' sign  Neurologic: Alert oriented x3 left-sided. weakness arm    BP (!) 126/42   Pulse 71   Temp 98.8 °F (37.1 °C) (Oral)   Resp 20   Ht 5' 1\" (1.549 m)   Wt 174 lb 9.6 oz (79.2 kg)   LMP 04/03/2004 (Within Years)   SpO2 99%   BMI 32.99 kg/m²     CBC:   Lab Results   Component Value Date    WBC 5.7 03/09/2021    RBC 2.38 03/09/2021    HGB 8.0 03/09/2021    HCT 27.6 03/09/2021    .0 03/09/2021    MCH 33.6 03/09/2021    MCHC 29.0 03/09/2021    RDW 15.0 03/09/2021    PLT 74 03/09/2021    MPV 10.3 03/09/2021     BMP:    Lab Results   Component Value Date     03/09/2021    K 4.6 03/09/2021    CL 95 03/09/2021    CO2 41 03/09/2021    BUN 40 03/09/2021    LABALBU 3.5 03/06/2021    LABALBU Detected 11/30/2018    CREATININE 1.84 03/09/2021    CALCIUM 8.7 03/09/2021    GFRAA 33 03/09/2021    LABGLOM 27 03/09/2021    GLUCOSE 90 03/09/2021    GLUCOSE 132 03/07/2012        Assessment:  Patient Active Problem List   Diagnosis    Controlled type 2 diabetes mellitus with stage 3 chronic kidney disease, without long-term current use of insulin (HCC)    Hyperlipidemia    Essential hypertension    Chronic leg pain    Paroxysmal atrial fibrillation (HCC)    Asthma    Gastroesophageal reflux disease without esophagitis    ECTOR on CPAP    Type 2 diabetes mellitus with complication, without long-term current use of insulin (HCC)    Spinal stenosis in cervical region    Hypomagnesemia    Hyperphosphaturia    Hypocalcemia    [facility]'s [facility]    Acute cystitis with hematuria    Altered mental status    Iron deficiency anemia due to chronic blood loss    Morbid obesity with BMI of 40.0-44.9, adult (HCC)    Hyperplastic polyp of intestine    Diverticulosis    Panlobular emphysema (HCC)    Rapid atrial fibrillation (HCC)    CKD (chronic kidney disease) stage 3, GFR 30-59 ml/min    Anemia in chronic kidney disease    Chronic respiratory failure with hypoxia and hypercapnia (HCC)    Acute kidney injury superimposed on chronic kidney disease (HCC)    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 coli UTI (urinary tract infection)    Nephrolithiasis    Candidal intertrigo    Venous insufficiency    Malabsorption    Anemia of chronic renal failure, stage 4 (severe) (HCC)    Iron deficiency    Coronary atherosclerosis    COPD exacerbation (HCC)    Restless leg syndrome    SIRS (systemic inflammatory response syndrome) (HCC)    Nausea and vomiting in adult    Bacterial UTI    Odynophagia    Esophageal dysphagia    Candida esophagitis (HCC)    Sepsis due to urinary tract infection (HCC)    Chronic respiratory failure with hypoxia (HCC)    Chronic diastolic congestive heart failure (HCC)    History of ESBL E. coli infection    Stage 4 chronic kidney disease (HCC)    Fever    Macrocytic anemia    Hypercalcemia    Monoclonal gammopathy of undetermined significance    Acute nonintractable headache    Anemia of chronic renal failure, stage 4 (severe) (HCC)    Traumatic closed

## 2021-03-09 NOTE — PROGRESS NOTES
[facility], NEUROLOGY                    NEUROLOGY PROGRESS NOTE    PATIENT NAME: Jenny Vargas   PATIENT MRN: [id number]   PRIMARY CARE PHYSICIAN: Helen Spatz, MD  DATE OF SERVICE: 3/8/2021    CHIEF COMPLAINT: Fatigue, Altered Mental Status, and Hypotension       SUBJECTIVE:    Pt looks sleepy, she says she could not keep eyes open. Other than that, she is fully oriented to time/person/place. Pt admits she is depressed. 3-4 days ago, her GBP dose increased. Pt follows with Dr. Guillermo Espinosa  for her neurological issues. Per , she has had auditory hallucination for the past 2-3 months. Her PD was diagnosed about 10 years ago. Had MRA head, neck done, had motion artifacts but no significant stenosis or definite occlusion was reported. BRIEF HPI  Kapil Wiggins is a 70year old female who was admitted on 3/5/2021 at [facility]. Neurology was consulted on 3/6/2021 for altered mental status. Pt has Parkinson disease on sinemet CR 25/100 QID; requip 2mg TID; Azilect 1mg daily. Her baseline is walking with a walker. Mental status:     She has multiple medical issues including afib on Eliquis, COPD; ECTOR, current UTI on antibiotics    MRI brain on this admission showed right mid corona radiate acute small infarct.            MRA head, neck   The examination is mildly compromised due to motion artifact  No definite occlusion or evidence for significant stenosis    PAST MEDICAL HISTORY:         Diagnosis Date    Acute on chronic diastolic congestive heart failure ([address]     Anesthesia complication     DIFFICULTY WAKING OP    Asthma     Atrial fibrillation ([address] 2013    Cataracts, bilateral     Cellulitis     BILAT LOWER LEGS-PT STATES IS IMPROVING    Cerebral artery occlusion with cerebral infarction [facility])     Last stroke was February 2017 w/no deficits-TOTAL OF 3    CHF (congestive heart failure) (HCC)     Chronic kidney disease 2013    NOT ON DIALYSIS YET    Chronic kidney disease     Closed fracture of proximal end of right humerus with routine healing     Constipation     CHRONIC    Controlled type 2 diabetes mellitus with stage 3 chronic kidney disease, without long-term current use of insulin ([facility])     COPD (chronic obstructive pulmonary disease) ([facility] 2008    PT. SEES DR. BECK. Home O2 at 2-3L/NC 24/7.     Difficult intravenous access     VEINS ROLL    Difficulty walking     AMBULATES WITH THERAPPY    Diverticulosis     DM2 (diabetes mellitus, type 2) ([facility] [address] 2008    Full dentures     FULL UPPER ONLY    GERD (gastroesophageal reflux disease) 2008    ON RX    H/O fall     Headache(784.0)     [place] (hard of hearing)     HAS HEARING AIDS BUT DOES NOT WEAR    Hyperlipidemia     Hyperplastic polyp of intestine     Hypertension 2008    Impaired ambulation     USES TRANFER CHAIR WALKER OR CANE    Kidney stone 2018    PRESENTLY-SMALL    Living in nursing home     [address]    Morbid obesity with BMI of 40.0-44.9, adult ([facility] 12/30/2016    Muscle weakness     Nephrolithiasis 3/8/2018    Open wound     COCCYX    ECTOR on CPAP 2008    USES C-PAP NIGHTLY    Osteoarthritis     OSTEOARTHRITIS    Parkinson disease ([facility] 2015    Restless leg syndrome 2013    MILD    Spinal stenosis in cervical region 6/2/2013    Type II or unspecified type diabetes mellitus without mention of complication, not stated as uncontrolled     Urethral caruncle 3/8/2018    Wears glasses     Wears glasses        MEDICATIONS:     Current Facility-Administered Medications   Medication Dose Route Frequency Provider Last Rate Last Admin    ciprofloxacin (CIPRO) tablet 250 mg  250 mg Oral 2 times per day Sujata Golden MD   250 mg at 03/08/21 0958    acetaminophen (TYLENOL) tablet 500 mg  500 mg Oral Q6H PRN Sujata Golden MD        Or    acetaminophen (TYLENOL) tablet 1,000 mg  1,000 mg Oral Q6H PRN Aron Mcdonald MD   1,000 mg at 03/07/21 2052    famotidine (PEPCID) tablet 40 mg  40 mg Oral Nightly Aron Mcdonald MD   40 mg at 03/07/21 2059    magnesium oxide (MAG-OX) tablet 400 mg  400 mg Oral BID Aron Mcdonald MD   400 mg at 03/08/21 1706    atorvastatin (LIPITOR) tablet 40 mg  40 mg Oral Daily Gilbert Musa MD   40 mg at 03/07/21 2054    rasagiline (AZILECT) tablet 1 mg  1 mg Oral Daily Aron Mcdonald MD   1 mg at 03/08/21 0945    modafinil (PROVIGIL) tablet 100 mg  100 mg Oral Daily Aron Mcdonald MD   100 mg at 03/08/21 1400    glucose (GLUTOSE) 40 % oral gel 15 g  15 g Oral PRN Aron Mcdonald MD        dextrose 50 % IV solution  12.5 g Intravenous PRN Aron Mcdonald MD        glucagon (rDNA) injection 1 mg  1 mg Intramuscular PRN Aron Mcdonald MD        dextrose 5 % solution  100 mL/hr Intravenous PRN Aron Mcdonald MD        insulin lispro (HUMALOG) injection vial 0-6 Units  0-6 Units Subcutaneous TID WC Aron Mcdonald MD   1 Units at 03/08/21 1145    insulin lispro (HUMALOG) injection vial 0-3 Units  0-3 Units Subcutaneous Nightly Aron Mcdonald MD        carbidopa-levodopa (SINEMET)  MG per tablet 1 tablet  1 tablet Oral 4x Daily Aron Mcdonald MD   1 tablet at 03/08/21 1706    apixaban (ELIQUIS) tablet 5 mg  5 mg Oral BID Gilbert Musa MD   5 mg at 03/08/21 4914    sodium chloride flush 0.9 % injection 10 mL  10 mL Intravenous 2 times per day Aron Mcdonald MD   10 mL at 03/06/21 2118    sodium chloride flush 0.9 % injection 10 mL  10 mL Intravenous PRN Aron Mcdonald MD        albuterol (PROVENTIL) nebulizer solution 2.5 mg  2.5 mg Nebulization TID Aron Mcdonald MD   2.5 mg at 03/08/21 1437    amiodarone (CORDARONE) tablet 200 mg  200 mg Oral Daily Aron Mcdonald MD   200 mg at 03/08/21 0943    calcitRIOL (ROCALTROL) capsule 0.25 mcg  0.25 mcg Oral BID Aron Mcdonald MD   0.25 mcg at 03/08/21 0912    oyster shell calcium/vitamin D 250-125 MG-UNIT per tablet 500 mg  2 tablet Oral Daily Helen Spatz, MD   500 mg at 03/08/21 1216    B-12 CAPS 5,000 mcg  5,000 mcg Oral BID Helen Spatz, MD   5,000 mcg at 03/08/21 0953    ferrous sulfate (FE TABS 325) EC tablet 325 mg  325 mg Oral BID WC Helen Spatz, MD   325 mg at 03/08/21 1706    budesonide-formoterol (SYMBICORT) 160-4.5 MCG/ACT inhaler 2 puff  2 puff Inhalation BID Helen Spatz, MD   2 puff at 03/08/21 0936    furosemide (LASIX) tablet 20 mg  20 mg Oral Daily Helen Spatz, MD   20 mg at 03/08/21 0946    gabapentin (NEURONTIN) capsule 200 mg  200 mg Oral TID Helen Spatz, MD   200 mg at 03/08/21 1400    isosorbide mononitrate (IMDUR) extended release tablet 30 mg  30 mg Oral Daily Helen Spatz, MD   30 mg at 03/08/21 0944    linagliptin (TRADJENTA) tablet 5 mg  5 mg Oral Daily Helen Spatz, MD   5 mg at 03/08/21 0943    metoprolol tartrate (LOPRESSOR) tablet 12.5 mg  12.5 mg Oral BID Helen Spatz, MD   12.5 mg at 03/08/21 0946    modafinil (PROVIGIL) tablet 200 mg  200 mg Oral Daily Helen Spatz, MD   200 mg at 03/08/21 0944    montelukast (SINGULAIR) tablet 10 mg  10 mg Oral Nightly Helen Spatz, MD   10 mg at 03/07/21 2054    QUEtiapine (SEROQUEL) tablet 50 mg  50 mg Oral Nightly Helen Spatz, MD   50 mg at 03/07/21 2208    pantoprazole (PROTONIX) tablet 40 mg  40 mg Oral Daily Helen Spatz, MD   40 mg at 03/08/21 0944    rOPINIRole (REQUIP) tablet 2 mg  2 mg Oral TID Helen Spatz, MD   2 mg at 03/08/21 1358    tiotropium (SPIRIVA RESPIMAT) 2.5 MCG/ACT inhaler 2 puff  2 puff Inhalation Daily Helen Spatz, MD   2 puff at 03/08/21 0936    sodium chloride flush 0.9 % injection 10 mL  10 mL Intravenous 2 times per day Helen Spatz, MD   10 mL at 03/08/21 0946    sodium chloride flush 0.9 % injection 10 mL  10 mL Intravenous PRN Helen Spatz, MD        promethazine (PHENERGAN) tablet 12.5 mg  12.5 mg Oral Q6H PRN Sujata Golden MD        Or    ondansetron (ZOFRAN) injection 4 mg  4 mg Intravenous Q6H PRN Sujata Golden MD        polyethylene glycol (GLYCOLAX) packet 17 g  17 g Oral Daily PRN Sujata Golden MD        acetaminophen (TYLENOL) tablet 650 mg  650 mg Oral Q6H PRN Sujata Golden MD   650 mg at 03/06/21 1700    Or    acetaminophen (TYLENOL) suppository 650 mg  650 mg Rectal Q6H PRN Sujata Golden MD            ALLERGIES:     Allergies   Allergen Reactions    Dye [Barium-Containing Compounds] Other (See Comments)     Cause Afib per     Pcn [Penicillins] Itching and Swelling    Bactrim [Sulfamethoxazole-Trimethoprim] Other (See Comments)     Allergic Nephritis       REVIEW OF SYSTEMS:         All items selected indicate a positive finding. Those items not selected are negative.   Constitutional [] Weight loss/gain   [x] Fatigue  [] Fever/Chills   HEENT [] Hearing Loss  [] Visual Disturbance  [] Tinnitus  [] Eye pain   Respiratory [] Shortness of Breath  [] Cough  [] Snoring   Cardiovascular [] Chest Pain  [] Palpitations  [] Lightheaded   GI [] Constipation  [] Diarrhea  [] Swallowing change  [] Nausea/vomiting    [] Urinary Frequency  [] Urinary Urgency   Musculoskeletal [] Neck pain  [] Back pain  [] Muscle pain  [] Restless legs   Dermatologic [] Skin changes   Neurologic [] Memory loss/confusion  [] Seizures  [x] Trouble walking or imbalance  [] Dizziness  [] Sleep disturbance  [x] Weakness  [] Numbness  [x] Tremors  [] Speech Difficulty  [] Headaches  [] Light Sensitivity  [] Sound Sensitivity   Endocrinology []Excessive thirst  []Excessive hunger   Psychiatric [x] Anxiety/Depression  [x] Hallucination   Allergy/immunology []Hives/environmental allergies   Hematologic/lymph [] Abnormal bleeding  [] Abnormal bruising      VITALS  BP (!) 100/42   Pulse 69   Temp 98.2 °F (36.8 °C) (Oral)   Resp 20   Ht 5' 1\" (1.549 m)   Wt 174 lb 9.6 oz (79.2 kg)   LMP 04/03/2004 (Within Years)   SpO2 96%   BMI 32.99 kg/m²       PHYSICAL EXAMINATION:       General Appearance:  Alert, cooperative, no signs of distress, appears stated age   Skin:  No rash or lesions   HEENT:  Normocephalic, conjunctiva/corneas clear; eyelids intact   Ears:  Normal external ear and canals   Nose: Nares normal, mucosa normal, no drainage    Throat: Lips and tongue normal; teeth normal;  gums normal   Neck: Supple, intact flexion, extension and rotation;   trachea midline;  no adenopathy;   thyroid: not enlarged;   no carotid pulse abnormality   Lungs:   Respirations unlabored   Heart: Regular rate and rhythm   Abdominal: BS present, soft, NT, ND   Extremities: No cyanosis, no edema   Psych Normal affect      NEUROLOGICAL EXAMINATION:      Mental status    Awake, alert and oriented; no aphasia, no dysarthria      Cranial nerves    II - visual fields intact to confrontation                                                III, IV, VI  PERRLA, EOMI, no pupillary defect; no HYACINTH, no nystagmus, no ptosis   V - normal facial sensation                                                               VII - normal facial symmetry                                                             VIII - intact hearing                                                                             IX, X - symmetrical palate                                                                  XI - symmetrical shoulder shrug                                                       XII - midline tongue without atrophy or fasciculation      Motor function  No abnormal movements; tone and bulk okay  Muscle strength:   RUE: delta 5/5, biceps 5/5, triceps 5/5,  5/5  LUE: delta 5/5, biceps 5/5, triceps 5/5,  5/5  RLE: hf 5/5, ke 5/5, kf 5/5, df 5/5, pf 5/5  LLE: hf 5/5, ke 5/5, kf 5/5, df 5/5, pf 5/5     Coordination FNF no dysmetria, heel to shin okay, DIANE okay, negative Romberg      Reflex function Intact 2+ DTR and symmetric.  Negative Babinski      Gait                  Normal station and gait, able to do Tandem      Sensory function Intact to light touch/temp/pp/vibration in bilateral upper and lower extremities. Intact joint position sense, no extinction       LABS & TESTS:     LABS & TESTS  Lab Results   Component Value Date    WBC 5.2 03/08/2021    HGB 8.0 (L) 03/08/2021    HCT 27.9 (L) 03/08/2021    .7 (H) 03/08/2021    PLT 83 (L) 03/08/2021     ASSESSMENT:   1. Fatigue: multifactorial, recent stroke, anemia, COPD, UTI  2. PD  3. Small ischemic stroke   4. Afib     PLANS:   1. Minimize CNS effective agents  2. Treating infection   3. Continue PD medications  4. PT, OT  5. Optimal BP, BG control, continue eliquis       Thank you for this interesting consult, will follow.      Adama Coon MD  Neurology Attending  Clinic: [phone]

## 2021-03-09 NOTE — PLAN OF CARE
Problem: Falls - Risk of:  Goal: Will remain free from falls  Description: Will remain free from falls  3/9/2021 1231 by Brendon Houston RN  Outcome: Ongoing  Note: Bed in lowest position, call light within reach, hourly rounding bed/chair alarm  3/9/2021 1231 by Brendon Houston RN  Outcome: Ongoing  3/9/2021 0605 by Lion Tejada RN  Outcome: Ongoing

## 2021-03-09 NOTE — CARE COORDINATION
Social work: Patient accepted at [facility]. Tentative transport arranged for 2 pm.    UPDATE: Dr Sebas Peng has not made rounds yet, therefore, 2:00PM pickup cancelled and transportation placed on \"will call\" status. When patient is ready contact [facility] at [phone] to arrange transport. Call [facility] when time is setup at [phone] or [facility] cell # [phone]. Fax: [phone].

## 2021-03-09 NOTE — PROGRESS NOTES
dentures, GERD (gastroesophageal reflux disease), H/O fall, Headache(784.0), [place] (hard of hearing), Hyperlipidemia, Hyperplastic polyp of intestine, Hypertension, Impaired ambulation, Kidney stone, Living in nursing home, Morbid obesity with BMI of 40.0-44.9, adult ([facility] Muscle weakness, Nephrolithiasis, Open wound, ECTOR on CPAP, Osteoarthritis, Parkinson disease ([facility] Restless leg syndrome, Spinal stenosis in cervical region, Type II or unspecified type diabetes mellitus without mention of complication, not stated as uncontrolled, Urethral caruncle, Wears glasses, and Wears glasses. has a past surgical history that includes Cervical disc surgery (2012); Appendectomy; Tonsillectomy and adenoidectomy (1953); hernia repair (1999); eye surgery (Bilateral, 2017); Cervical spine surgery (5/31/13); laminectomy (05/31/2013); Upper gastrointestinal endoscopy (12/28/2016); Colonoscopy (2009); Colonoscopy (12/29/2016); Colonoscopy (12/30/2016); Colonoscopy (04/25/2017); pr colsc flx w/removal lesion by hot bx forceps (N/A, 4/25/2017); Cystorrhaphy (04/04/2018); pr cystourethroscopy,biopsies (N/A, 4/4/2018); pr [facility] incl fluor gdnce dx w/cell washg spx (N/A, 6/5/2018); Upper gastrointestinal endoscopy (N/A, 8/29/2018); shoulder fracture surgery (Right, 5/28/2019); Hardware Removal (Right, 07/05/2019); Hardware Removal (Right, 7/5/2019); Cystoscopy (N/A, 10/28/2020); and Cystoscopy (N/A, 10/30/2020). Restrictions  Restrictions/Precautions  Restrictions/Precautions: General Precautions, Fall Risk, Up as Tolerated, Contact Precautions  Required Braces or Orthoses?: No  Position Activity Restriction  Other position/activity restrictions: fall history, ALARMS, up with assist  Subjective   General  Chart Reviewed: Yes  Additional Pertinent Hx: CHF, CVA x 3, DM, HTN, OA, Parkinsons  Family / Caregiver Present: No  Subjective  Subjective: Pt denies pain this date, agreeable to PT.   Pain Screening  Patient Currently in Pain: No  Vital Signs  Patient Currently in Pain: No  Oxygen Therapy  O2 Device: Nasal cannula  O2 Flow Rate (L/min): 2 L/min       Orientation  Orientation  Overall Orientation Status: Within Functional Limits  Cognition      Objective   Bed mobility  Scooting: Maximal assistance;2 Person assistance  Transfers  Sit to Stand: Moderate Assistance;2 Person Assistance  Stand to sit: Moderate Assistance;2 Person Assistance  Bed to Chair: Moderate assistance;2 Person Assistance  Comment: Cues for proper hand placement with sit>stand and RW. Upon standing, pt demo'd posterior lean required assist to correct. Practiced sit<>stand x 2. Ambulation  Ambulation?: Yes  More Ambulation?: No  Ambulation 1  Surface: level tile  Device: Rolling Walker  Other Apparatus: O2  Assistance: Moderate assistance;2 Person assistance  Quality of Gait: shuffles, increased time to complete, increased time for initiation, forward trunk/head  Gait Deviations: Slow Trena;Decreased step length;Decreased step height;Shuffles  Distance: 4 steps from bed>chair  Comments: Pt very shakey with transfer, required modAx2 for safety and advancement of RW. MAX cues for squaring up prior to sitting to decrease fall risk. Balance  Posture: Poor  Sitting - Static: Fair  Sitting - Dynamic: Poor  Standing - Static: Poor  Comments: Mod use of UEs on RW during standing, pt very shakey with poor posture during stands. Patient able to lateral weight shift x 30' with no LOB. G-Code     OutComes Score       AM-PAC Score     AM-PAC Inpatient Mobility without Stair Climbing Raw Score : 10 (03/09/21 1325)  AM-PAC Inpatient without Stair Climbing T-Scale Score : 34.07 (03/09/21 1325)  Mobility Inpatient CMS 0-100% Score: 71.66 (03/09/21 1325)  Mobility Inpatient without Stair CMS G-Code Modifier : CL (03/09/21 1325)       Goals  Short term goals  Time Frame for Short term goals: 12 visits:  Short term goal 1: Pt. to require mod A X1 for bed mob., rolling to Rt. and utilizing bedrail with LUE (as pt. is set up at home)  Short term goal 2: Pt. to have good- static sitting balance/  fair- dynamic sitting balance  Short term goal 3: Pt. to require mod A for sit to stand transfer with RW  Short term goal 4: Pt. to transfer bed to chair with RW and mod A  Short term goal 5: Pt. to tolerate 25+ min. of PT for ther ex/ gait/ balance training daily  Patient Goals   Patient goals : ambulate    Plan    Plan  Times per week: 1-2x/day; 5-6days/wk  Specific instructions for Next Treatment: progress ambulate as tolerated and safe  Current Treatment Recommendations: Strengthening, Transfer Training, ROM, Balance Training, Functional Mobility Training, Gait Training, Safety Education & Training, Endurance Training, Neuromuscular Re-education, Home Exercise Program, Positioning, Patient/Caregiver Education & Training  Safety Devices  Type of devices: All fall risk precautions in place, Gait belt, Patient at risk for falls, Call light within reach, Nurse notified, Left in chair, Chair alarm in place     Therapy Time   Individual Concurrent Group Co-treatment   Time In        0912   Time Out        0939   Minutes        27         Co-Treatment with OT warranted secondary to decreased safety and independence requiring 2 skilled therapy professionals to address individual discipline's goals. PT addressing preparation for transfers, gait and Pre gait activities, sitting balance for increased sitting/activity tolerance, functional mobility, environmental safety/scanning, fall prevention, transfers and functional LE strength.   Gilberto Murphy, Student Physical Therapist Assistant

## 2021-03-09 NOTE — PROGRESS NOTES
Cardiovascular progress Note          Patient name: Nancy Gibson    YOB: 1945  Date of admission:  3/5/2021       Patient seen, examined. Previous clinical entries reviewed. All available laboratory, imaging and ancillary data reviewed. Subjective:      No new complaints. She has this chronic left chest wall vague pain. Systems review:  Constitutional: No fever/chills. HENT: No headache, neck pain or neck stiffness. No sore throat or dysphagia. Gastrointestinal: No abdominal pain, nausea or vomiting. Cardiac: As Above  Respiratory: As above  Neurologic: No new focal weakness or numbness  Psychiatric: Normal mood and mentation       Examination:   Vitals: BP (!) 126/42   Pulse 71   Temp 98.8 °F (37.1 °C) (Oral)   Resp 18   Ht 5' 1\" (1.549 m)   Wt 174 lb 9.6 oz (79.2 kg)   LMP 04/03/2004 (Within Years)   SpO2 99%   BMI 32.99 kg/m²     Intake/Output Summary (Last 24 hours) at 3/9/2021 0851  Last data filed at 3/9/2021 6367  Gross per 24 hour   Intake 240 ml   Output 950 ml   Net -710 ml       General appearance: Comfortable in no apparent distress. HEENT: No pallor. No icterus  Neck: Supple. Lungs:Generally decreased breath sounds  Heart: S1,S2  Abdomen: Soft  Extremities: No peripheral edema  Skin: No obvious rashes. Musculoskeletal: No obvious deformities. Neurologic: No focal deficits. Labs/ Ancillary data:     CBC:   Recent Labs     03/07/21  0246 03/08/21  0636 03/09/21  0633   WBC 6.4 5.2 5.7   HGB 8.3* 8.0* 8.0*   * 83* 74*     BMP:    Recent Labs     03/07/21  0246 03/08/21  0636 03/09/21  0633    144 143   K 4.3 4.5 4.6   CL 97* 96* 95*   CO2 37* 42* 41*   BUN 39* 37* 40*   CREATININE 1.98* 1.79* 1.84*   GLUCOSE 107* 92 90     Troponin:   Recent Labs     03/07/21  0246 03/07/21  0637   TROPONINT NOT REPORTED NOT REPORTED     BNP: No results for input(s): BNP in the last 72 hours.   Lipids:   Recent Labs     03/06/21  1755   CHOL 183   HDL 31* Medications:   Scheduled Meds:   ciprofloxacin  250 mg Oral 2 times per day    famotidine  40 mg Oral Nightly    magnesium oxide  400 mg Oral BID    atorvastatin  40 mg Oral Daily    rasagiline  1 mg Oral Daily    modafinil  100 mg Oral Daily    insulin lispro  0-6 Units Subcutaneous TID WC    insulin lispro  0-3 Units Subcutaneous Nightly    carbidopa-levodopa  1 tablet Oral 4x Daily    apixaban  5 mg Oral BID    sodium chloride flush  10 mL Intravenous 2 times per day    albuterol  2.5 mg Nebulization TID    amiodarone  200 mg Oral Daily    calcitRIOL  0.25 mcg Oral BID    oyster shell calcium/vitamin D  2 tablet Oral Daily    B-12  5,000 mcg Oral BID    ferrous sulfate  325 mg Oral BID WC    budesonide-formoterol  2 puff Inhalation BID    furosemide  20 mg Oral Daily    gabapentin  200 mg Oral TID    isosorbide mononitrate  30 mg Oral Daily    linagliptin  5 mg Oral Daily    metoprolol tartrate  12.5 mg Oral BID    modafinil  200 mg Oral Daily    montelukast  10 mg Oral Nightly    QUEtiapine  50 mg Oral Nightly    pantoprazole  40 mg Oral Daily    rOPINIRole  2 mg Oral TID    tiotropium  2 puff Inhalation Daily    sodium chloride flush  10 mL Intravenous 2 times per day     Continuous Infusions:   dextrose           Assessment/ Plan :   Small ischemic stroke  Paroxysmal atrial fibrillation-currently in sinus rhythm. Anemia. Chronic renal failure. Parkinson's disease with history of falling in the past.    Continue current management. We will follow patient with you. Thank you very much for allowing us to participate in the care of this patient. Please call us with any questions.

## 2021-03-09 NOTE — PROGRESS NOTES
[facility], NEUROLOGY                    NEUROLOGY PROGRESS NOTE    PATIENT NAME: John Su   PATIENT MRN: [id number]   PRIMARY CARE PHYSICIAN: Valeria Domingo MD  DATE OF SERVICE: 3/9/2021    CHIEF COMPLAINT: Fatigue, Altered Mental Status, and Hypotension       SUBJECTIVE:    Pt looks more alert today. She is sitting in the chair with BiPAP on.  says he is concerned that she is so drowsy, so he put mask on her. Also he is concerned that once pt goes to [address] [place] [address], her care will be compromised. Ux from yesterday grew EColi > 228712 CFU/ml, started antibiotics today. Pt states today she feels better. Had MRA head, neck done, had motion artifacts but no significant stenosis or definite occlusion was reported. BRIEF HPI  Crys Hughes is a 70year old female who was admitted on 3/5/2021 at [facility]. Neurology was consulted on 3/6/2021 for altered mental status. Pt has Parkinson disease on sinemet CR 25/100 QID; requip 2mg TID; Azilect 1mg daily. Her baseline is walking with a walker. Mental status:     She has multiple medical issues including afib on Eliquis, COPD; ECTOR, current UTI on antibiotics    MRI brain on this admission showed right mid corona radiate acute small infarct.            MRA head, neck   The examination is mildly compromised due to motion artifact  No definite occlusion or evidence for significant stenosis    PAST MEDICAL HISTORY:         Diagnosis Date    Acute on chronic diastolic congestive heart failure ([address]     Anesthesia complication     DIFFICULTY WAKING OP    Asthma     Atrial fibrillation ([address] 2013    Cataracts, bilateral     Cellulitis     BILAT LOWER LEGS-PT STATES IS IMPROVING    Cerebral artery occlusion with cerebral infarction [facility])     Last stroke was February 2017 w/no deficits-TOTAL OF 3    CHF (congestive heart failure) (HCC)     Chronic kidney disease 2013    NOT ON DIALYSIS YET    Chronic kidney disease     Closed fracture of proximal end of right humerus with routine healing     Constipation     CHRONIC    Controlled type 2 diabetes mellitus with stage 3 chronic kidney disease, without long-term current use of insulin ([facility])     COPD (chronic obstructive pulmonary disease) ([facility] 2008    PT. SEES DR. BECK. Home O2 at 2-3L/NC 24/7.     Difficult intravenous access     VEINS ROLL    Difficulty walking     AMBULATES WITH THERAPPY    Diverticulosis     DM2 (diabetes mellitus, type 2) ([facility] 2008    Full dentures     FULL UPPER ONLY    GERD (gastroesophageal reflux disease) 2008    ON RX    H/O fall     Headache(784.0)     [place] (hard of hearing)     HAS HEARING AIDS BUT DOES NOT WEAR    Hyperlipidemia     Hyperplastic polyp of intestine     Hypertension 2008    Impaired ambulation     USES TRANFER CHAIR WALKER OR CANE    Kidney stone 2018    PRESENTLY-SMALL    Living in nursing home     [address] [facility] [address]    Morbid obesity with BMI of 40.0-44.9, adult ([facility] 12/30/2016    Muscle weakness     Nephrolithiasis 3/8/2018    Open wound     COCCYX    ECTOR on CPAP 2008    USES C-PAP NIGHTLY    Osteoarthritis     OSTEOARTHRITIS    Parkinson disease ([facility] 2015    Restless leg syndrome 2013    MILD    Spinal stenosis in cervical region 6/2/2013    Type II or unspecified type diabetes mellitus without mention of complication, not stated as uncontrolled     Urethral caruncle 3/8/2018    Wears glasses     Wears glasses        MEDICATIONS:     Current Facility-Administered Medications   Medication Dose Route Frequency Provider Last Rate Last Admin    ciprofloxacin (CIPRO) tablet 250 mg  250 mg Oral 2 times per day Siria Vu MD   250 mg at 03/09/21 0900    acetaminophen (TYLENOL) tablet 500 mg  500 mg Oral Q6H PRN Siria Vu MD        Or    acetaminophen (TYLENOL) tablet 1,000 mg  1,000 mg Oral Q6H PRN Rohith Bonilla MD   1,000 mg at 03/09/21 0250    famotidine (PEPCID) tablet 40 mg  40 mg Oral Nightly Rohith Bonilla MD   40 mg at 03/08/21 2037    magnesium oxide (MAG-OX) tablet 400 mg  400 mg Oral BID Rohith Bonilla MD   400 mg at 03/09/21 1153    atorvastatin (LIPITOR) tablet 40 mg  40 mg Oral Daily Francia Trimble MD   40 mg at 03/08/21 2037    rasagiline (AZILECT) tablet 1 mg  1 mg Oral Daily Rohith Bonilla MD   1 mg at 03/09/21 0859    modafinil (PROVIGIL) tablet 100 mg  100 mg Oral Daily Rohith Bonilla MD   100 mg at 03/09/21 1424    glucose (GLUTOSE) 40 % oral gel 15 g  15 g Oral PRN Rohith Bonilla MD        dextrose 50 % IV solution  12.5 g Intravenous PRN Rohith Bonilla MD        glucagon (rDNA) injection 1 mg  1 mg Intramuscular PRN Rohith Bonilla MD        dextrose 5 % solution  100 mL/hr Intravenous PRN Rohith Bonilla MD        insulin lispro (HUMALOG) injection vial 0-6 Units  0-6 Units Subcutaneous TID WC Rohith Bonilla MD   1 Units at 03/08/21 1145    insulin lispro (HUMALOG) injection vial 0-3 Units  0-3 Units Subcutaneous Nightly Rohith Bonilla MD        carbidopa-levodopa (SINEMET)  MG per tablet 1 tablet  1 tablet Oral 4x Daily Rohith Bonilla MD   1 tablet at 03/09/21 1424    apixaban (ELIQUIS) tablet 5 mg  5 mg Oral BID Francia Trimble MD   5 mg at 03/09/21 0900    sodium chloride flush 0.9 % injection 10 mL  10 mL Intravenous 2 times per day Rohith Bonilla MD   10 mL at 03/08/21 2039    sodium chloride flush 0.9 % injection 10 mL  10 mL Intravenous PRN Rohith Bonilla MD        albuterol (PROVENTIL) nebulizer solution 2.5 mg  2.5 mg Nebulization TID Rohith Bonilla MD   2.5 mg at 03/09/21 1214    amiodarone (CORDARONE) tablet 200 mg  200 mg Oral Daily Rohith Bonilla MD   200 mg at 03/09/21 0900    calcitRIOL (ROCALTROL) capsule 0.25 mcg  0.25 mcg Oral BID Rohith Bonilla MD   0.25 mcg at 03/09/21 0900    oyster shell Stephanie Loco MD        Or    ondansetron [facility]) injection 4 mg  4 mg Intravenous Q6H PRN Stephanie Loco MD        polyethylene glycol (GLYCOLAX) packet 17 g  17 g Oral Daily PRN Stephanie Loco MD        acetaminophen (TYLENOL) tablet 650 mg  650 mg Oral Q6H PRN Stephanie Loco MD   650 mg at 03/06/21 1700    Or    acetaminophen (TYLENOL) suppository 650 mg  650 mg Rectal Q6H PRN Stephanie Loco MD            ALLERGIES:     Allergies   Allergen Reactions    Dye [Barium-Containing Compounds] Other (See Comments)     Cause Afib per     Pcn [Penicillins] Itching and Swelling    Bactrim [Sulfamethoxazole-Trimethoprim] Other (See Comments)     Allergic Nephritis       REVIEW OF SYSTEMS:         All items selected indicate a positive finding. Those items not selected are negative.   Constitutional [] Weight loss/gain   [x] Fatigue  [] Fever/Chills   HEENT [] Hearing Loss  [] Visual Disturbance  [] Tinnitus  [] Eye pain   Respiratory [] Shortness of Breath  [] Cough  [] Snoring   Cardiovascular [] Chest Pain  [] Palpitations  [] Lightheaded   GI [] Constipation  [] Diarrhea  [] Swallowing change  [] Nausea/vomiting    [] Urinary Frequency  [] Urinary Urgency   Musculoskeletal [] Neck pain  [] Back pain  [] Muscle pain  [] Restless legs   Dermatologic [] Skin changes   Neurologic [] Memory loss/confusion  [] Seizures  [x] Trouble walking or imbalance  [] Dizziness  [] Sleep disturbance  [x] Weakness  [] Numbness  [x] Tremors  [] Speech Difficulty  [] Headaches  [] Light Sensitivity  [] Sound Sensitivity   Endocrinology []Excessive thirst  []Excessive hunger   Psychiatric [x] Anxiety/Depression  [x] Hallucination   Allergy/immunology []Hives/environmental allergies   Hematologic/lymph [] Abnormal bleeding  [] Abnormal bruising      VITALS  BP (!) 126/42   Pulse 71   Temp 98.8 °F (37.1 °C) (Oral)   Resp 20   Ht 5' 1\" (1.549 m)   Wt 174 lb 9.6 oz (79.2 kg)   LMP 04/03/2004 (Within Years) SpO2 99%   BMI 32.99 kg/m²       PHYSICAL EXAMINATION:       General Appearance:  Alert, cooperative, no signs of distress, appears stated age   Skin:  No rash or lesions   HEENT:  Normocephalic, conjunctiva/corneas clear; eyelids intact   Ears:  Normal external ear and canals   Nose: Nares normal, mucosa normal, no drainage    Throat: Lips and tongue normal; teeth normal;  gums normal   Neck: Supple, intact flexion, extension and rotation;   trachea midline;  no adenopathy;   thyroid: not enlarged;   no carotid pulse abnormality   Lungs:   Respirations unlabored   Heart: Regular rate and rhythm   Abdominal: BS present, soft, NT, ND   Extremities: No cyanosis, no edema   Psych Normal affect      NEUROLOGICAL EXAMINATION:      Mental status    Awake, alert and oriented; no aphasia, no dysarthria      Cranial nerves    II - visual fields intact to confrontation                                                III, IV, VI  PERRLA, EOMI, no pupillary defect; no HYACINTH, no nystagmus, no ptosis   V - normal facial sensation                                                               VII - normal facial symmetry                                                             VIII - intact hearing                                                                             IX, X - symmetrical palate                                                                  XI - symmetrical shoulder shrug                                                       XII - midline tongue without atrophy or fasciculation      Motor function  No abnormal movements; tone and bulk okay  Muscle strength deferred   Coordination FNF no dysmetria, heel to shin okay, DIANE okay,  Romberg deferred      Reflex function 1+ DTR and symmetric. Negative Babinski      Gait                  Deferred    Sensory function Intact to light touch/temp/pp/vibration in bilateral upper and lower extremities.  Intact joint position sense, no extinction       LABS & TESTS:

## 2021-03-09 NOTE — CARE COORDINATION
Social work: Patient to [employer] to [address] [place] [address]  via [facility]  at [facility].  # for RN report: [phone]. Completed ROLANDA needs faxed to [phone]. Informed RN, pt, and facility of dc time, agreeable to plan. HENS completed.

## 2021-03-09 NOTE — PLAN OF CARE
Problem: Falls - Risk of:  Goal: Will remain free from falls  Description: Will remain free from falls  3/9/2021 0605 by Gordy Cardozo RN  Outcome: Ongoing  3/8/2021 1733 by Kirti Roberts RN  Outcome: Ongoing  Note: Bed in lowest position, call light within reach, hourly rounding , bed alarm  Goal: Absence of physical injury  Description: Absence of physical injury  Outcome: Ongoing     Problem: Skin Integrity:  Goal: Will show no infection signs and symptoms  Description: Will show no infection signs and symptoms  Outcome: Ongoing  Goal: Absence of new skin breakdown  Description: Absence of new skin breakdown  Outcome: Ongoing     Problem: Discharge Planning:  Goal: Discharged to appropriate level of care  Description: Discharged to appropriate level of care  Outcome: Ongoing     Problem: Serum Glucose Level - Abnormal:  Goal: Ability to maintain appropriate glucose levels will improve  Description: Ability to maintain appropriate glucose levels will improve  Outcome: Ongoing     Problem: Sensory Perception - Impaired:  Goal: Ability to maintain a stable neurologic state will improve  Description: Ability to maintain a stable neurologic state will improve  Outcome: Ongoing     Problem: Mental Status - Impaired:  Goal: Mental status will improve  Description: Mental status will improve  Outcome: Ongoing     Problem:  Activity:  Goal: Ability to tolerate increased activity will improve  Description: Ability to tolerate increased activity will improve  Outcome: Ongoing     Problem: ACTIVITY INTOLERANCE/IMPAIRED MOBILITY  Goal: Mobility/activity is maintained at optimum level for patient  Outcome: Ongoing     Problem: Pain:  Goal: Pain level will decrease  Description: Pain level will decrease  Outcome: Ongoing  Goal: Control of acute pain  Description: Control of acute pain  Outcome: Ongoing  Goal: Control of chronic pain  Description: Control of chronic pain  Outcome: Ongoing

## 2021-03-09 NOTE — PROGRESS NOTES
Pt transferred to [employer] via [employer].  With belongings, ROLANDA, script for Neurontin, CPAP home meds vit b12 and provigil

## 2021-03-09 NOTE — PROGRESS NOTES
Report called to [facility] at [phone] [facility]

## 2021-03-09 NOTE — DISCHARGE SUMMARY
Physician Discharge Summary     Patient ID:  Ashley Mcgee  [id number]  49 y.o.  1945    Admit date: 3/5/2021    Discharge date and time: 3/9/2021    Admission Diagnoses:   Patient Active Problem List   Diagnosis    Controlled type 2 diabetes mellitus with stage 3 chronic kidney disease, without long-term current use of insulin (HCC)    Hyperlipidemia    Essential hypertension    Chronic leg pain    Paroxysmal atrial fibrillation (HCC)    Asthma    Gastroesophageal reflux disease without esophagitis    ECTOR on CPAP    Type 2 diabetes mellitus with complication, without long-term current use of insulin ([facility])    Spinal stenosis in cervical region    Hypomagnesemia    Hyperphosphaturia    Hypocalcemia    Parkinson's disease [facility]    Acute cystitis with hematuria    Altered mental status    Iron deficiency anemia due to chronic blood loss    Morbid obesity with BMI of 40.0-44.9, adult ([facility])    Hyperplastic polyp of intestine    Diverticulosis    Panlobular emphysema (HCC)    Rapid atrial fibrillation (HCC)    CKD (chronic kidney disease) stage 3, GFR 30-59 ml/min    Anemia in chronic kidney disease    Chronic respiratory failure with hypoxia and hypercapnia ([facility])    Acute kidney injury superimposed on chronic kidney disease ([facility]    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 coli UTI (urinary tract infection)    Nephrolithiasis    Candidal intertrigo    Venous insufficiency    Malabsorption    Anemia of chronic renal failure, stage 4 (severe) ([facility])    Iron deficiency    Coronary atherosclerosis    COPD exacerbation ([facility])    Restless leg syndrome    SIRS (systemic inflammatory response syndrome) ([facility])    Nausea and vomiting in adult    Bacterial UTI    Odynophagia    Esophageal dysphagia    Candida esophagitis (HCC)    Sepsis due to urinary tract infection (HCC)    Chronic respiratory failure with hypoxia (HCC)    Chronic diastolic congestive heart failure and does not have IV access.)      ciprofloxacin (CIPRO) 250 MG tablet Take 1 tablet by mouth every 12 hours for 10 doses  Qty: 10 tablet, Refills: 0         CONTINUE these medications which have CHANGED    Details   gabapentin (NEURONTIN) 100 MG capsule Take 2 capsules by mouth 3 times daily for 30 days. Qty: 90 capsule, Refills: 0    Associated Diagnoses: Spinal stenosis in cervical region         CONTINUE these medications which have NOT CHANGED    Details   albuterol sulfate HFA (PROAIR HFA) 108 (90 Base) MCG/ACT inhaler Inhale 2 puffs into the lungs every 6 hours as needed for Wheezing      furosemide (LASIX) 40 MG tablet Take 20 mg by mouth daily       linagliptin (TRADJENTA) 5 MG tablet Take 5 mg by mouth daily      metoprolol tartrate (LOPRESSOR) 25 MG tablet Take 12.5 mg by mouth 2 times daily 1/2 tab (=12.5mg) BID      pantoprazole (PROTONIX) 40 MG tablet Take 40 mg by mouth daily      famotidine (PEPCID) 40 MG tablet Take 40 mg by mouth nightly      !! modafinil (PROVIGIL) 200 MG tablet Take 200 mg by mouth daily. Indications: 200mg AM and 100mg in afternoon       montelukast (SINGULAIR) 10 MG tablet Take 10 mg by mouth nightly      albuterol (PROVENTIL) (2.5 MG/3ML) 0.083% nebulizer solution Take 2.5 mg by nebulization 3 times daily      !! modafinil (PROVIGIL) 100 MG tablet Take 100 mg by mouth every evening.  Indications: 200mg AM and 100mg in afternoon      QUEtiapine (SEROQUEL) 50 MG tablet Take 50 mg by mouth nightly      apixaban (ELIQUIS) 2.5 MG TABS tablet Take 1 tablet by mouth 2 times daily  Qty: 60 tablet, Refills: 0      Cyanocobalamin (B-12) 5000 MCG CAPS Take 5,000 mcg by mouth 2 times daily       tiotropium (SPIRIVA) 18 MCG inhalation capsule Inhale 18 mcg into the lungs daily      isosorbide mononitrate (IMDUR) 30 MG extended release tablet Take 1 tablet by mouth daily  Qty: 30 tablet, Refills: 0      fluticasone-vilanterol (BREO ELLIPTA) 100-25 MCG/INH AEPB inhaler Inhale 2 puffs into the lungs daily      melatonin 5 MG TABS tablet Take 5 mg by mouth nightly as needed (sleep)      calcitRIOL (ROCALTROL) 0.25 MCG capsule Take 0.25 mcg by mouth 2 times daily       atorvastatin (LIPITOR) 20 MG tablet TAKE 1 TABLET DAILY  Qty: 90 tablet, Refills: 2      carbidopa-levodopa (SINEMET CR)  MG per extended release tablet Take 1 tablet by mouth 4 times daily  Qty: 360 tablet, Refills: 3    Associated Diagnoses: Parkinson's disease (HCC)      rOPINIRole (REQUIP) 2 MG tablet Take 1 tablet by mouth 3 times daily  Qty: 270 tablet, Refills: 3    Associated Diagnoses: Parkinson's disease (HCC)      amiodarone (CORDARONE) 200 MG tablet Take 200 mg by mouth daily       ferrous sulfate 325 (65 Fe) MG EC tablet Take 1 tablet by mouth 2 times daily (with meals)  Qty: 90 tablet, Refills: 3      rasagiline mesylate 1 MG TABS Take 1 tablet by mouth daily      Oxygen Concentrator Dx: Pneumonia, use as directed. Qty: 1 each, Refills: 0      Calcium Carb-Cholecalciferol (OYSTER SHELL CALCIUM/VITAMIN D) 250-125 MG-UNIT per tablet Take 2 tablets by mouth daily  Qty: 30 tablet, Refills: 0      Calcium-Vitamin D-Vitamin K 650-12.5-40 MG-MCG-MCG CHEW Take 1 tablet by mouth daily as needed (low calcium intake)      !! ONETOUCH VERIO strip 1 each by In Vitro route daily As needed. Qty: 100 each, Refills: 3      !! ONE TOUCH ULTRA TEST strip USE 1 STRIP THREE TIMES A DAY  Qty: 300 each, Refills: 3      magnesium oxide (MAG-OX) 400 (241.3 Mg) MG TABS tablet Take 1 tablet by mouth 2 times daily  Qty: 30 tablet      senna (SENOKOT) 8.6 MG tablet Take 1-2 tablets by mouth nightly        !! - Potential duplicate medications found. Please discuss with provider.       STOP taking these medications       QUEtiapine (SEROQUEL XR) 50 MG extended release tablet Comments:   Reason for Stopping:         miconazole (MICOTIN) 2 % powder Comments:   Reason for Stopping:             Activity: Up with a walker with assistance  Diet: diabetic diet added salt 1800 cc fluid restriction    Follow-up with PCP in 1 week. Community referral completed more than 30 minutes spent on discharge  Signed:   Marvin Easton MD  3/9/2021  5:46 PM

## 2021-03-09 NOTE — PROGRESS NOTES
Pulmonary Critical Care Progress Note  Sushma Forrest M.D. Patient seen for the follow up of chronic hypoxic/hypercarbic respiratory failure, obstructive sleep apnea, hypersomnia, COPD    Subjective:  She is sitting up in a chair in no distress. She denies any shortness of breath, no cough or chest pain. She wore her BiPAP for most of the night last night. She does complain of feeling sleepy this morning. Length of stay: 3 Days    Examination:    Vitals: BP (!) 126/42   Pulse 71   Temp 98.8 °F (37.1 °C) (Oral)   Resp 18   Ht 5' 1\" (1.549 m)   Wt 174 lb 9.6 oz (79.2 kg)   LMP 04/03/2004 (Within Years)   SpO2 99%   BMI 32.99 kg/m²     General appearance: alert and cooperative with exam, no distress, alert and oriented, appropriate  Neck: No JVD  Lungs: Moderate/decreased air exchange with no wheeze  Heart: regular rate and rhythm, S1, S2 normal, no gallop  Abdomen: Soft, non tender, + BS  Extremities: no cyanosis or clubbing.  Mild edema    LABs:  CBC:   Recent Labs     03/08/21  0636 03/09/21  0633   WBC 5.2 5.7   HGB 8.0* 8.0*   HCT 27.9* 27.6*   PLT 83* 74*     BMP:   Recent Labs     03/08/21  0636 03/09/21  0633    143   K 4.5 4.6   CO2 42* 41*   BUN 37* 40*   CREATININE 1.79* 1.84*   LABGLOM 28* 27*   GLUCOSE 92 90       Impression:  · Chronic hypoxic/hypercarbic respiratory failure  · Acute microinfarct right mid corona radiata  · Obstructive sleep apnea/Obesity, on home CPAP therapy  · Hypersomnia  · COPD/former smoker with 30-pack-year smoking history  · Generalized weakness/altered mental status  · Chronic diastolic heart failure  · Elevated troponin  · DM, A. fib, CKD, GERD, HTN, Parkinson's    Recommendations:  · Oxygen via nasal cannula, keep SPO2 90% or greater  · Home CPAP while asleep and as needed during the dayencourage use  · Albuterol Q 4 hours and prn  · Symbicort  · Spiriva Respimat  · Continue modafinil  · Neurology following  · DVT prophylaxis, on Eliquis  · Incentive spirometry every hour while awake  · OK for discharge planning from pulmonary stand point to SNF with follow up with pulmonary in 2-3 weeks. Plan was discussed with patient and she understands it.   · Will follow with you    Electronically signed by     Kojo Samson MD on 3/9/2021 at 11:01 AM  Pulmonary Critical Care and Sleep Medicine,  [facility]  Cell: [phone]  Office: [phone]

## 2021-03-09 NOTE — PROGRESS NOTES
Occupational Therapy  Facility/Department: [facility]  Daily Treatment Note  NAME: Nancy Gibson  : 1945  MRN: [id number]    Date of Service: 3/9/2021    Discharge Recommendations:  Subacute/Skilled Nursing Facility       Assessment   Performance deficits / Impairments: Decreased functional mobility ; Decreased ADL status; Decreased ROM; Decreased strength;Decreased sensation;Decreased endurance;Decreased cognition;Decreased safe awareness;Decreased balance;Decreased high-level IADLs;Decreased fine motor control;Decreased posture;Decreased coordination  Assessment: Pt would benefit from continued skilled OT services to increase I and safety during functional tasks to return home at prior level of function as able. Prognosis: Fair  REQUIRES OT FOLLOW UP: Yes  Activity Tolerance  Activity Tolerance: Patient Tolerated treatment well;Patient limited by fatigue  Activity Tolerance: P+  Safety Devices  Safety Devices in place: Yes  Type of devices: All fall risk precautions in place; Left in chair;Call light within reach;Nurse notified; Chair alarm in place;Gait belt;Patient at risk for falls         Patient Diagnosis(es): The primary encounter diagnosis was Altered mental status, unspecified altered mental status type. A diagnosis of Elevated troponin was also pertinent to this visit.       has a past medical history of Acute on chronic diastolic congestive heart failure ([address] Anesthesia complication, Asthma, Atrial fibrillation ([address] Cataracts, bilateral, Cellulitis, Cerebral artery occlusion with cerebral infarction ([address] CHF (congestive heart failure) ([address] Chronic kidney disease, Chronic kidney disease, Closed fracture of proximal end of right humerus with routine healing, Constipation, Controlled type 2 diabetes mellitus with stage 3 chronic kidney disease, without long-term current use of insulin ([address] COPD (chronic obstructive pulmonary disease) ([address] Difficult intravenous access, Difficulty walking, Caregiver Present: No      Orientation  Orientation  Overall Orientation Status: Within Normal Limits  Objective             Balance  Sitting Balance: Stand by assistance  Standing Balance: Maximum assistance(Mod-Max A x2 with walker)  Standing Balance  Time: ~1 min  Activity: standing with walker  Bed mobility  Supine to Sit: Maximum assistance;2 Person assistance  Scooting: Maximal assistance;2 Person assistance  Comment: Verbal and tactile cues for sequencing movements and overall safety/technique. Transfers  Stand Step Transfers: Moderate assistance;Maximum assistance;2 Person assistance  Sit to stand: Moderate assistance;Maximum assistance;2 Person assistance  Stand to sit: Moderate assistance;Maximum assistance;2 Person assistance  Transfer Comments: Pt was able to complete stand step transfer from EOB to recliner using RW with Mod-Max A x2 with verbal cues for safety and total assist with line mgmt. Pt is unsteady at times with knees often shaking/buckling. Staff to use juancho stedy for safety at this time. Cognition  Overall Cognitive Status: Exceptions  Arousal/Alertness: Appropriate responses to stimuli  Following Commands:  Follows one step commands consistently  Attention Span: Appears intact  Memory: Appears intact  Safety Judgement: Decreased awareness of need for safety  Problem Solving: Assistance required to generate solutions;Assistance required to implement solutions;Decreased awareness of errors;Assistance required to identify errors made;Assistance required to correct errors made  Insights: Decreased awareness of deficits  Initiation: Requires cues for some  Sequencing: Requires cues for some     Perception  Overall Perceptual Status: [facility]                                   Plan   Plan  Times per week: 4-5x/wk 1x/day as wally  Current Treatment Recommendations: Strengthening, ROM, Balance Training, Functional Mobility Training, Safety Education & Training, Neuromuscular

## 2021-03-18 NOTE — PROGRESS NOTES
Patient arrive via wheelchair with family for aranesp and b12 injections. Has had no sign or symptom of covid and has been vaccinated. Denies new complaint or concern; continues to have SOB at baseline. Labs reviewed. Patient tolerate B12 injection and Aranesp injection well; band-aids applied. Patient off unit with family at discharge; copy of return appointment provided.

## 2021-03-25 NOTE — PATIENT INSTRUCTIONS
Make Aranesp q 2 weeks for Hb below 10  CMP, Vitamin D level, Vitamin B12 level and Iron studies at RV  RV 4-6 months at time of treatment

## 2021-03-25 NOTE — TELEPHONE ENCOUNTER
KHOI ARRIVES VIA WHEELCHAIR FOR MD VISIT  DR Will Barrios IN TO SEE PATIENT  ORDERS RECEIVED  CONTINUE ARANESP Q3 WEEKS FOR HB BELOW 10  CMP VIT D LEVEL VIT B12 LEVEL & IRON STUDIES @RV  RV 4-6 MONTHS @ TIME OF TX  LABS CMP FE TIBC FERRITIN VITAMIN D& B12 7/8/21  MD VISIT 7/8/21 @2:15PM  [email or web address]  AVS PRINTED AND GIVEN TO PATIENT WITH INSTRUCTIONS  PATIENT DISCHARGED VIA WHEELCHAIR

## 2021-03-28 NOTE — PROGRESS NOTES
_           Chief Complaint   Patient presents with    Follow-up    Discuss Labs     DIAGNOSIS:       Previous labs with Macrocytic anemia. Previous Evidence of Iron deficiency. History of thrombocytopenia. Repeated labs today with normocytic anemia. Anemia of chronic renal insufficiency stage IV. Multiple co-morbidities as listed. CURRENT THERAPY:     IV Iron infusion May 2018. Started Aranesp July 2018 for Hb below 10. BRIEF CASE HISTORY:      Ms. Magdalena Bah is a very pleasant 70 y.o. female with history of multiple co-morbidities as listed. She has increasing weakness and fatigue. she presented to the hospital with what she thought was vaginal bleeding and possible hematuria. She was evaluated by GYN and urology. Cystoscopy was done. She had urethral lesion. She was noted to have severe anemia. She denies active bleeding at the present time. No hematemesis or melena. No hematochezia. Patient denies chest pain or palpitation. No SOB. She uses a wheelchair. INTERIM HISTORY:   The patient is seen for follow up anemia. She received Iron infusion in May 2018. She is maintained on Aranesp every 3 weeks for hemoglobin below 10. . Tolerated well. No side effects. She C/O weakness and fatigue. Overall feels better. No active bleeding. She had Parkinson disease. She is on treatment. No CP. No headache or dizziness. No weight loss. No active bleeding.      PAST MEDICAL HISTORY: has a past medical history of Acute on chronic diastolic congestive heart failure ([address] Anesthesia complication, Asthma, Atrial fibrillation ([address] Cataracts, bilateral, Cellulitis, Cerebral artery occlusion with cerebral infarction [facility]), CHF (congestive heart failure) ([address] Chronic kidney disease, Chronic kidney disease, Closed fracture of proximal end of right humerus with routine healing, Constipation, Controlled type 2 diabetes mellitus with stage 3 chronic kidney disease, without long-term current use of insulin ([facility] [address] COPD (chronic obstructive pulmonary disease) ([facility] [address] Difficult intravenous access, Difficulty walking, Diverticulosis, DM2 (diabetes mellitus, type 2) ([facility] [address] Full dentures, GERD (gastroesophageal reflux disease), H/O fall, Headache(784.0), [place] (hard of hearing), Hyperlipidemia, Hyperplastic polyp of intestine, Hypertension, Impaired ambulation, Kidney stone, Living in nursing home, Morbid obesity with BMI of 40.0-44.9, adult ([facility] [address] Muscle weakness, Nephrolithiasis, Open wound, ECTOR on CPAP, Osteoarthritis, Parkinson disease ([facility] [address] Restless leg syndrome, Spinal stenosis in cervical region, Type II or unspecified type diabetes mellitus without mention of complication, not stated as uncontrolled, Urethral caruncle, Wears glasses, and Wears glasses. PAST SURGICAL HISTORY: has a past surgical history that includes Cervical disc surgery (2012); Appendectomy; Tonsillectomy and adenoidectomy (1953); hernia repair (1999); eye surgery (Bilateral, 2017); Cervical spine surgery (5/31/13); laminectomy (05/31/2013); Upper gastrointestinal endoscopy (12/28/2016); Colonoscopy (2009); Colonoscopy (12/29/2016); Colonoscopy (12/30/2016); Colonoscopy (04/25/2017); pr colsc flx w/removal lesion by hot bx forceps (N/A, 4/25/2017); Cystorrhaphy (04/04/2018); pr cystourethroscopy,biopsies (N/A, 4/4/2018); pr [facility] incl fluor gdnce dx w/cell washg spx (N/A, 6/5/2018); Upper gastrointestinal endoscopy (N/A, 8/29/2018); shoulder fracture surgery (Right, 5/28/2019); Hardware Removal (Right, 07/05/2019); Hardware Removal (Right, 7/5/2019); Cystoscopy (N/A, 10/28/2020); and Cystoscopy (N/A, 10/30/2020).      CURRENT MEDICATIONS:  has a current medication list which includes the following prescription(s): gabapentin, albuterol sulfate hfa, furosemide, linagliptin, metoprolol tartrate, pantoprazole, famotidine, modafinil, montelukast, albuterol, modafinil, quetiapine, apixaban, oyster shell calcium/vitamin d, b-12, tiotropium, isosorbide mononitrate, fluticasone-vilanterol, melatonin, calcitriol, atorvastatin, carbidopa-levodopa, ropinirole, onetouch verio, one touch ultra test, amiodarone, magnesium oxide, senna, ferrous sulfate, rasagiline mesylate, oxygen concentrator, and calcium-vitamin d-vitamin k. ALLERGIES:  is allergic to dye [barium-containing compounds]; pcn [penicillins]; and bactrim [sulfamethoxazole-trimethoprim]. FAMILY HISTORY: Negative for any hematological or oncological conditions. SOCIAL HISTORY:  reports that she quit smoking about 50 years ago. Her smoking use included cigarettes. She has a 30.00 pack-year smoking history. She has never used smokeless tobacco. She reports previous alcohol use. She reports that she does not use drugs. REVIEW OF SYSTEMS:     · General: + weakness + fatigue. No unanticipated weight loss or decreased appetite. No fever or chills. · Eyes: No blurred vision, eye pain or double vision. · Ears: No hearing problems or drainage. No tinnitus. · Throat: No sore throat, problems with swallowing or dysphagia. · Respiratory: No cough, sputum or hemoptysis. No shortness of breath. No pleuritic chest pain. · Cardiovascular: No chest pain, orthopnea or PND. No lower extremity edema. No palpitation. · Gastrointestinal: No problems with swallowing. No abdominal pain or bloating. No nausea or vomiting. No diarrhea or constipation. No GI bleeding. · Genitourinary: No dysuria, hematuria, frequency or urgency. · Musculoskeletal: ++ limitation of movement. ++ gait disturbance, using wheelchair. · Dermatologic: No skin rashes or pruritus. No skin lesions or discolorations. · Psychiatric: No depression, anxiety, or stress or signs of schizophrenia. No change in mood or affect. · Hematologic: No history of bleeding tendency. No bruises or ecchymosis.  No history of clotting Value Date/Time    CALCIUM 8.7 03/09/2021 0633    ALKPHOS 62 03/06/2021 0639    AST 10 03/06/2021 0639    ALT <5 (L) 03/06/2021 0639    BILITOT 0.12 (L) 03/06/2021 0639        Lab Results   Component Value Date    IRON 59 10/24/2020    TIBC 254 10/24/2020    FERRITIN 132 10/24/2020         IMPRESSION:   Previous labs with Macrocytic anemia. Previous Evidence of Iron deficiency. History of thrombocytopenia. Repeated labs today with normocytic anemia. Anemia of chronic renal insufficiency stage IV. Multiple co-morbidities as listed. Status post right shoulder fracture. Status post surgery. Recent diagnosis of Parkinson disease    PLAN:   I reviewed the labs as above and discussed with the patient. I explained to the patient the nature of this hematologic problem. I explained the significance of these abnormalities in layman language. She is s/p Iron replacement. Will continue PO Iron replacement. CBC from today showed hemoglobin below 10. Aranesp will be given today. It is anemia of chronic renal insufficiency stage IV. We will make Aranesp 200 mcg every 2 weeks for Hb below 10. We will check the vitamin B12 and folate and iron studies at the time of next blood draw. We will make further recommendations based on response to treatment. Patient's questions were answered to the best of her satisfaction and she verbalized full understanding and agreement.

## 2023-02-10 NOTE — PROGRESS NOTES
Active problem Gait imbalance . Falling . Parkinson's disease. Postural lightheadedness . Atrial fibrillation. The condition is MRI of Head with small right corona radiata infarction per radiology which I am not able to visualize with chronic bilateral periventricular small vessel disease . She was not ambulated in PT with some reported knee buckling . She is alert and oriented x 3 with grossly full motor strength . 71 yo wf with generalized weakness trouble ambulation . She has history of parkinson's disease on sinemet CR 25/100 po qid , requip 2 mg po tid and azilect 1 mg po qd . She has been treated for UTI on ATB over the last few days . There was also apparent fall 2 days ago . There is described generalized weakness with trouble ambulating along with postural lightheadedness . She will typically walk with walker . She does have COPD along with sleep anea with hypersomnia on provigil 200 mg po qd . There is atrial fibrillation on eliquis 2.5 mg po bid Head CT with bilateral basal ganglia calcification with bilateral chronic periventricular small vessel ischemia . Significant medications sinemet CR 25/100 po qid , requip 2 mg po tid , azilect 1 mg po qd, provigil 200 mg po qd, eliquis 2.5 mg po bid  . Testing Head CT with bilateral basal ganglia calcification with bilateral chronic periventricular small vessel ischemia  . MRI of Head with small right corona radiata infarction which I am not able to visualize with chronic bilateral periventricular small vessel disease .  Cholesterol 183 , ,       Past Medical History:   Diagnosis Date    Acute on chronic diastolic congestive heart failure (HCC)     Anesthesia complication     DIFFICULTY WAKING OP    Asthma     Atrial fibrillation ([facility] [address] 2013    Cataracts, bilateral     Cellulitis     BILAT LOWER LEGS-PT STATES IS IMPROVING    Cerebral artery occlusion with cerebral infarction [facility])     Last stroke was February 2017 w/no deficits-TOTAL OF 3    CHF V2.0  [facility] Hospitalist Progress Note      Name:  Parisa Deluna /Age/Sex: 1959  (66 y.o. male)   MRN & CSN:  [id number] & [id number] Encounter Date/Time: 2023 8:02 PM EST    Location:  [id number] PCP: Jassi Sylvester MD       Hospital Day: 2    Assessment and Plan:   Parisa Deluan is a 66 y.o. male     5 through 9-was on nitroglycerin drip and heparin drip when I saw him. Cardiac cath planned tomorrow. #.  Hypertensive emergency  --192/ at presentation  -Troponin elevation 0.043- 0.046  -Patient started on nitro drip in ER  -Resume home medications and wean off nitro drip as tolerated. #.  Chest pain  -EKG with no acute ST-T wave changes.  -Troponin elevated at 0.043, 0.046  -Serial troponins, repeat EKG  -N.p.o. until evaluated by cardiology  -Patient started on heparin drip in ER.  -Continue aspirin, Plavix     #. Uncontrolled hypertension  -Patient is on hydralazine 100 mg every 8 hours, carvedilol, lisinopril 40 mg daily  -Continue hydralazine  -Holding carvedilol as patient urine drug screen is positive for cocaine, holding lisinopril due to CKD. #.  Uncontrolled diabetes mellitus  -Random blood glucose 310  -Patient is on Farxiga, glipizide  -Continue Lantus 5 units daily a.m., insulin sliding scale with hypoglycemia protocol  -Check HbA1c. #.  Cocaine abuse  -Urine drug screen positive for cocaine     #. Coronary artery disease s/p CABG  -RCA stent-2013  -STEMI-2019-RCA stent   -CABG 2020     #. Systolic CHF  -[id number] 45%  -Appears compensated  -Resume home medications-Bumex, metolazone     #. Chronic atrial fibrillation  -Patient is on carvedilol, Coumadin     #. CKD stage III  -Nephrologist-Dr. Melony Martínez     #. Diabetic neuropathy-continue Lyrica     #. COPD-does not appear to be in exacerbation     #. AD     #. Obesity with BMI 34.75        Diet ADULT DIET; Regular; 4 carb choices (60 gm/meal);  Low Fat/Low Chol/High Fiber/2 gm (congestive heart failure) ([facility] [address]     Chronic kidney disease 2013    NOT ON DIALYSIS YET    Chronic kidney disease     Closed fracture of proximal end of right humerus with routine healing     Constipation     CHRONIC    Controlled type 2 diabetes mellitus with stage 3 chronic kidney disease, without long-term current use of insulin (HCC)     COPD (chronic obstructive pulmonary disease) ([address] 2008    PT. SEES DR. BECK. Home O2 at 2-3L/NC 24/7.     Difficult intravenous access     VEINS ROLL    Difficulty walking     AMBULATES WITH THERAPPY    Diverticulosis     DM2 (diabetes mellitus, type 2) ([facility] [address] 2008    Full dentures     FULL UPPER ONLY    GERD (gastroesophageal reflux disease) 2008    ON RX    H/O fall     Headache(784.0)     [place] (hard of hearing)     HAS HEARING AIDS BUT DOES NOT WEAR    Hyperlipidemia     Hyperplastic polyp of intestine     Hypertension 2008    Impaired ambulation     USES TRANFER CHAIR WALKER OR CANE    Kidney stone 2018    PRESENTLY-SMALL    Living in nursing home     [address]    Morbid obesity with BMI of 40.0-44.9, adult ([facility] [address] 12/30/2016    Muscle weakness     Nephrolithiasis 3/8/2018    Open wound     COCCYX    ECTOR on CPAP 2008    USES C-PAP NIGHTLY    Osteoarthritis     OSTEOARTHRITIS    Parkinson disease ([facility] [address] 2015    Restless leg syndrome 2013    MILD    Spinal stenosis in cervical region 6/2/2013    Type II or unspecified type diabetes mellitus without mention of complication, not stated as uncontrolled     Urethral caruncle 3/8/2018    Wears glasses     Wears glasses        Past Surgical History:   Procedure Laterality Date    APPENDECTOMY      CERVICAL [address] SURGERY  2012    CERVICAL SPINE SURGERY  5/31/13    posterior c5-t1    COLONOSCOPY  2009    COLONOSCOPY  12/29/2016    incomplete was not cleaned out    COLONOSCOPY  12/30/2016    COLONOSCOPY  04/25/2017     SIGMOID COLON POLYPECTOMY: Na  Diet NPO Exceptions are: Ice Chips, Sips of Water with Meds   DVT Prophylaxis [] Lovenox, [x]  Heparin, [] SCDs, [] Ambulation,  [] Eliquis, [] Xarelto  [] Coumadin   Code Status Full Code   Disposition From: Home with wife  Expected Disposition: Back home  Estimated Date of Discharge: To be determined-angiogram to be done tomorrow  Patient requires continued admission due to needs coronary angiogram   Surrogate Decision Maker/ Eddie Speaks, his wife is listed as a alternate contact on the chart     Subjective:     Chief Complaint: Hypertension and Chest Pain       Savannah More Claudean Bussing is a 66 y.o. male     He was sitting up at the edge of the bed when I saw him. He was on heparin drip and nitroglycerin drip. He denied complaints. Review of Systems:    Review of Systems    No vomiting or bleeding reported    Objective: Intake/Output Summary (Last 24 hours) at 2/9/2023 2002  Last data filed at 2/9/2023 1735  Gross per 24 hour   Intake 480 ml   Output 1925 ml   Net -1445 ml        Vitals:   Vitals:    02/09/23 1557   BP:    Pulse: 75   Resp: 16   Temp:    SpO2:        Physical Exam:     General: NAD  Eyes: EOMI  Skin: warm, dry  Neuro: Alert. Psych: Mood appropriate.      Medications:   Medications:    sodium chloride flush  5-40 mL IntraVENous 2 times per day    [START ON 2/10/2023] aspirin  81 mg Oral Daily    atorvastatin  40 mg Oral Nightly    bumetanide  2 mg Oral BID    clopidogrel  75 mg Oral Daily    hydrALAZINE  100 mg Oral TID    pregabalin  150 mg Oral BID    metOLazone  5 mg Oral BID    potassium chloride  20 mEq Oral BID WC    insulin lispro  0-8 Units SubCUTAneous TID WC    insulin lispro  0-4 Units SubCUTAneous Nightly    insulin glargine  5 Units SubCUTAneous Daily with breakfast    nicotine  1 patch TransDERmal Daily      Infusions:    sodium chloride      dextrose      nitroGLYCERIN 5 mcg/min (02/09/23 1823)    heparin (PORCINE) Infusion 14 Units/kg/hr (02/09/23 1826)     PRN Meds: sodium [address] [facility] ,   DIVERTICULOSIS    CYSTORRHAPHY  04/04/2018    CYSTOSCOPY N/A 10/28/2020    CYSTOSCOPY performed by Bubba Shaikh MD at [address] [place] [address] 10/30/2020    CYSTOSCOPY PYELOGRAM WITH HOLMIUM LASER RIGHT STENT INSERTION performed by Bubba Shaikh MD at [place] [address] Bilateral 2017    CATARACTS W/ IOL    HARDWARE REMOVAL Right 07/05/2019    HARDWARE REMOVAL PINS  HUMERUS     HARDWARE REMOVAL Right 7/5/2019    HARDWARE REMOVAL PINS  HUMERUS  C-ARM performed by Terrell Bowen MD at [address]  05/31/2013    Dr. Nancy Nguyen DX W/CELL WASHG [facility] N/A 6/5/2018    BRONCHOSCOPY performed by Paramjit Livingston MD at [address]  N/A 4/25/2017    COLONOSCOPY POLYPECTOMY HOT BIOPSY performed by Delbert Moses MD at [facility] N/A 4/4/2018    CYSTOSCOPY performed by Virlinda Burkitt, MD at [address] Right 5/28/2019    SHOULDER CLOSED REDUCTION PERCUTANEOUS PINNING RIGHT performed by Terrell Bowen MD at [place] [address] ENDOSCOPY  12/28/2016    gastritis, esophagitis,     UPPER GASTROINTESTINAL ENDOSCOPY N/A 8/29/2018    EGD BIOPSY performed by Delbert Moses MD at [address] [facility] History   Problem Relation Age of Onset    Heart Failure Mother     Hypertension Mother     Heart Disease Mother     High Blood Pressure Mother        Social History     Socioeconomic History    Marital status:      Spouse name: None    Number of children: None    Years of education: None    Highest education level: None   Occupational History    None   Social Needs    Financial resource strain: None    Food insecurity     Worry: None     Inability: None    Transportation needs     Medical: None     Non-medical: None   Tobacco Use    Smoking status: Former chloride flush, 5-40 mL, PRN  sodium chloride, , PRN  ondansetron, 4 mg, Q8H PRN   Or  ondansetron, 4 mg, Q6H PRN  acetaminophen, 650 mg, Q6H PRN   Or  acetaminophen, 650 mg, Q6H PRN  polyethylene glycol, 17 g, Daily PRN  albuterol sulfate HFA, 2 puff, Q6H PRN  glucose, 4 tablet, PRN  dextrose bolus, 125 mL, PRN   Or  dextrose bolus, 250 mL, PRN  glucagon (rDNA), 1 mg, PRN  dextrose, , Continuous PRN  heparin (porcine), 4,000 Units, PRN  heparin (porcine), 2,000 Units, PRN        Labs      Recent Results (from the past 24 hour(s))   EKG 12 Lead    Collection Time: 02/08/23 10:01 PM   Result Value Ref Range    Ventricular Rate 71 BPM    Atrial Rate 71 BPM    P-R Interval 196 ms    QRS Duration 116 ms    Q-T Interval 428 ms    QTc Calculation (Bazett) 465 ms    P Axis 36 degrees    R Axis -10 degrees    T Axis 79 degrees    Diagnosis       Normal sinus rhythm  Inferior infarct (cited on or before 15-JUL-2022)  Abnormal ECG  When compared with ECG of 08-FEB-2023 09:32,  No significant change was found  Confirmed by Heaven Wu ([id number]) on 2/9/2023 1:24:42 PM     POCT Glucose    Collection Time: 02/08/23 10:06 PM   Result Value Ref Range    POC Glucose 316 (H) 70 - 99 MG/DL   Troponin    Collection Time: 02/08/23 10:19 PM   Result Value Ref Range    Troponin T 0.046 (H) <0.01 NG/ML   APTT    Collection Time: 02/08/23 10:19 PM   Result Value Ref Range    aPTT 31.8 25.1 - 37.1 SECONDS   Drug screen multi urine    Collection Time: 02/08/23 11:10 PM   Result Value Ref Range    Cannabinoid Scrn, Ur NEGATIVE NEGATIVE    Amphetamines NEGATIVE NEGATIVE    Cocaine Metabolite UNCONFIRMED POSITIVE (A) NEGATIVE    Benzodiazepine Screen, Urine NEGATIVE NEGATIVE    Barbiturate Screen, Ur NEGATIVE NEGATIVE    Opiates, Urine NEGATIVE NEGATIVE    Phencyclidine, Urine NEGATIVE NEGATIVE    Oxycodone NEGATIVE NEGATIVE   POCT Glucose    Collection Time: 02/09/23  4:57 AM   Result Value Ref Range    POC Glucose 94 70 - 99 MG/DL   APTT Collection Time: 02/09/23  6:19 AM   Result Value Ref Range    aPTT 33.8 25.1 - 37.1 SECONDS   Troponin    Collection Time: 02/09/23  6:19 AM   Result Value Ref Range    Troponin T 0.040 (H) <0.01 NG/ML   Hemoglobin A1C    Collection Time: 02/09/23  6:19 AM   Result Value Ref Range    Hemoglobin A1C 6.9 (H) 4.2 - 6.3 %    eAG 151 mg/dL   POCT Glucose    Collection Time: 02/09/23 11:31 AM   Result Value Ref Range    POC Glucose 180 (H) 70 - 99 MG/DL   Troponin    Collection Time: 02/09/23  3:54 PM   Result Value Ref Range    Troponin T 0.052 (H) <0.01 NG/ML   APTT    Collection Time: 02/09/23  3:54 PM   Result Value Ref Range    aPTT 44.5 (H) 25.1 - 37.1 SECONDS   POCT Glucose    Collection Time: 02/09/23  5:15 PM   Result Value Ref Range    POC Glucose 208 (H) 70 - 99 MG/DL        Imaging/Diagnostics Last 24 Hours   XR CHEST PORTABLE    Result Date: 2/8/2023  EXAMINATION: ONE XRAY VIEW OF THE CHEST 2/8/2023 10:25 am COMPARISON: 11/08/2022 and prior HISTORY: ORDERING SYSTEM PROVIDED HISTORY: cxr/sob x few hours TECHNOLOGIST PROVIDED HISTORY: Reason for exam:->cxr/sob x few hours Reason for Exam: cxr/sob x few hours FINDINGS: Improved opacities overlying the right lower lung zone. Unchanged mild cardiomegaly. No pleural effusion or pneumothorax. No acute osseous abnormality. Status post CABG and median sternotomy. Left atrial appendage clip in place. Improved opacities overlying the right lower lung zone likely represent atelectasis with infection not excluded. Unchanged mild cardiomegaly.        Electronically signed by Kerline López MD on 2/9/2023 at 8:02 PM Smoker     Packs/day: 2.00     Years: 15.00     Pack years: 30.00     Types: Cigarettes     Quit date: 10/31/1970     Years since quittin.3    Smokeless tobacco: Never Used   Substance and Sexual Activity    Alcohol use: Not Currently    Drug use: No     Comment: Pt denies use.      Sexual activity: None     Comment: not for the past year d/t illness, denies  concerns/pain   Lifestyle    Physical activity     Days per week: None     Minutes per session: None    Stress: None   Relationships    Social connections     Talks on phone: None     Gets together: None     Attends [employer] service: None     Active member of club or organization: None     Attends meetings of clubs or organizations: None     Relationship status: None    Intimate partner violence     Fear of current or ex partner: None     Emotionally abused: None     Physically abused: None     Forced sexual activity: None   Other Topics Concern    None   Social History Narrative    None       Current Facility-Administered Medications   Medication Dose Route Frequency Provider Last Rate Last Admin    acetaminophen (TYLENOL) tablet 500 mg  500 mg Oral Q6H PRN Siria Vu MD        Or    acetaminophen (TYLENOL) tablet 1,000 mg  1,000 mg Oral Q6H PRN Siria Vu MD   1,000 mg at 21 0246    carbidopa-levodopa (SINEMET)  MG per tablet 1 tablet  1 tablet Oral 4x Daily Siria Vu MD   1 tablet at 21 0742    famotidine (PEPCID) tablet 40 mg  40 mg Oral Nightly Siria Vu MD   40 mg at 21    apixaban (ELIQUIS) tablet 5 mg  5 mg Oral BID Sylvie Batista MD   5 mg at 21 0737    sodium chloride flush 0.9 % injection 10 mL  10 mL Intravenous 2 times per day Siria Vu MD   10 mL at 21    sodium chloride flush 0.9 % injection 10 mL  10 mL Intravenous PRN Siria Vu MD        albuterol (PROVENTIL) nebulizer solution 2.5 mg  2.5 mg Nebulization TID Siria Vu MD   2.5 mg at 03/07/21 1009    amiodarone (CORDARONE) tablet 200 mg  200 mg Oral Daily Claude Brooklyn, MD   200 mg at 03/07/21 0741    atorvastatin (LIPITOR) tablet 20 mg  20 mg Oral Daily Claude Brooklyn, MD   20 mg at 03/06/21 2118    calcitRIOL (ROCALTROL) capsule 0.25 mcg  0.25 mcg Oral BID Claude Brooklyn, MD   0.25 mcg at 03/07/21 0739    oyster shell calcium/vitamin D 250-125 MG-UNIT per tablet 500 mg  2 tablet Oral Daily Claude Brooklyn, MD   500 mg at 03/05/21 2349    B-12 CAPS 5,000 mcg  5,000 mcg Oral BID Claude Brooklyn, MD   5,000 mcg at 03/07/21 0738    ferrous sulfate (FE TABS 325) EC tablet 325 mg  325 mg Oral BID WC Claude Brooklyn, MD   325 mg at 03/07/21 0743    budesonide-formoterol (SYMBICORT) 160-4.5 MCG/ACT inhaler 2 puff  2 puff Inhalation BID Claude Brooklyn, MD   2 puff at 03/07/21 1011    furosemide (LASIX) tablet 20 mg  20 mg Oral Daily Claude Brooklyn, MD   20 mg at 03/07/21 0742    gabapentin (NEURONTIN) capsule 200 mg  200 mg Oral TID Claude Brooklyn, MD   200 mg at 03/07/21 0734    isosorbide mononitrate (IMDUR) extended release tablet 30 mg  30 mg Oral Daily Claude Brooklyn, MD   30 mg at 03/07/21 0743    linagliptin (TRADJENTA) tablet 5 mg  5 mg Oral Daily Claude Brooklyn, MD   5 mg at 03/07/21 0736    Magnesium TABS 400 mg  400 mg Oral BID Claude Brooklyn, MD   400 mg at 03/07/21 0740    metoprolol tartrate (LOPRESSOR) tablet 12.5 mg  12.5 mg Oral BID Claude Brooklyn, MD   12.5 mg at 03/07/21 0734    modafinil (PROVIGIL) tablet 100 mg  100 mg Oral QPM Claude Brooklyn, MD   100 mg at 03/06/21 1702    modafinil (PROVIGIL) tablet 200 mg  200 mg Oral Daily Claude Brooklyn, MD   200 mg at 03/07/21 0736    montelukast (SINGULAIR) tablet 10 mg  10 mg Oral Nightly Claude Brooklyn, MD   10 mg at 03/06/21 2117    QUEtiapine (SEROQUEL) tablet 50 mg  50 mg Oral Nightly Claude Brooklyn, MD   50 mg at 03/06/21 2119    pantoprazole (PROTONIX) tablet 40 mg  40 mg Oral Daily Marvin Easton MD   40 mg at 03/07/21 8014    rasagiline mesylate TABS 1 mg  1 tablet Oral Daily Marvin Easton MD   1 mg at 03/07/21 0739    rOPINIRole (REQUIP) tablet 2 mg  2 mg Oral TID Marvin Easton MD   2 mg at 03/07/21 0740    tiotropium (SPIRIVA RESPIMAT) 2.5 MCG/ACT inhaler 2 puff  2 puff Inhalation Daily Marvin Easton MD   2 puff at 03/07/21 1011    sodium chloride flush 0.9 % injection 10 mL  10 mL Intravenous 2 times per day Marvin Easton MD   10 mL at 03/07/21 0746    sodium chloride flush 0.9 % injection 10 mL  10 mL Intravenous PRN Marvin Easton MD        promethazine (PHENERGAN) tablet 12.5 mg  12.5 mg Oral Q6H PRN Marvin Easton MD        Or    ondansetron (ZOFRAN) injection 4 mg  4 mg Intravenous Q6H PRN Marvin Easton MD        polyethylene glycol (GLYCOLAX) packet 17 g  17 g Oral Daily PRN Marvin Easton MD        acetaminophen (TYLENOL) tablet 650 mg  650 mg Oral Q6H PRN Marvin Easton MD   650 mg at 03/06/21 1700    Or    acetaminophen (TYLENOL) suppository 650 mg  650 mg Rectal Q6H PRN Marvin Easton MD           Allergies   Allergen Reactions    Dye [Barium-Containing Compounds] Other (See Comments)     Cause Afib per     Pcn [Penicillins] Itching and Swelling    Bactrim [Sulfamethoxazole-Trimethoprim] Other (See Comments)     Allergic Nephritis       ROS:   Constitutional                  Negative for fever and chills   HEENT                            Negative for ear discharge, ear pain, nosebleed  Eyes                                Negative for photophobia, pain and discharge  Respiratory                      Negative for hemoptysis and sputum  Cardiovascular                Negative for orthopnea, claudication and PND  Gastrointestinal               Negative for abdominal pain, diarrhea, blood in stool  Musculoskeletal               Negative for joint pain, negative for myalgia  Skin                                 Negative for rash or itching Endo/heme/allergies       Negative for polydipsia, environmental allergy  Psychiatric                       Negative for suicidal ideation. Patient is not anxious    Vitals:    03/07/21 1145   BP: (!) 110/42   Pulse: 68   Resp: 16   Temp: 97.9 °F (36.6 °C)   SpO2: 100%     Admission weight: 175 lb (79.4 kg)    Neurological Examination  Constitutional .General exam well groomed   Head/ Ears /Nose/Throat/external ear . Normal exam  Neck and thyroid . Normal size. No bruits  Respiratory . Breathsounds clear bilaterally  Cardiovascular: Auscultation of heart with regular rate and rhythm   Musculoskeletal. Muscle tone rigidity arms and legs                                                            Muscle strength 5/5 strength throughout                                                                                No dysmetria or dysdiadokinesis  No tremor   Normal fine motor  Orientation Alert and oriented x 3   Attention and concentration normal  Short term memory 2 words out of 3 in one minute  Language process and speech normal . No aphasia   Cranial nerve 2 normal acuety and visual fields  Cranial nerve 3, 4 and 6 . Extraocular muscles are intact . Pupils are equal and reactive   Cranial nerve 5 . Intact corneal reflex. Normal facial sensation  Cranial nerve 7 normal exam   Cranial nerve 8. Grossly intact hearing   Cranial nerve 9 and 10. Symmetric palate elevation   Cranial nerve 11 , 5 out of 5 strength   Cranial Nerve 12 midline tongue . No atrophy  Sensation . Normal pinprick and light touch   Deep Tendon Reflexes normal  Plantar response flexor bilaterally    Assessment :    Gait imbalance . Falling . Parkinson's disease. Postural lightheadedness . Atrial fibrillation . Possible right cerebral infarction     Plan:    Eliquis 5 mg po bid , lipitor 40 mg po qd . MRA of Head . MRA of neck . Cardiac 2 D echo  PT/OT .  Orthostatic blood pressures

## 2023-10-09 NOTE — PROGRESS NOTES
Patient arrived per wheelchair with  for injections. Patient complains of being increased tired. No other complaints. Labs reviewed. Aranesp and vitamin B12 injections given without complication. Band aides applied to sites. Patient discharged from unit per wheelchair with . with patient

## 2024-01-10 NOTE — CARE COORDINATION
Case Management Initial Discharge Plan  Blaise Narayan,         Readmission Risk              Readmission Risk:        23.5       Age 74 or Greater:  1    Admitted from SNF or Requires Paid or Family Care:  0    Currently has CHF,COPD,ARF,CRI,or is on dialysis:  4    Takes more than 5 Prescription Medications:  4    Takes Digoxin,Insulin,Anticoagulants,Narcotics or ASA/Plavix:  2    [address] in Past 12 Months:  10    On Disability:  0    Patient Considers own Health:  2.5            Met with:patient or spouse/SO to discuss discharge plans.    Information verified: address, contacts, phone number, , insurance Yes  PCP: Analisa Lim DO  Date of last visit: 2 weeks ago    Insurance Provider: [address], Medicare    Discharge Planning  Current Residence:   Private residence  Living Arrangements:  Spouse/Significant Other, Children   Home has 1 stories/3 steps to enter  Support Systems:  Spouse/Significant Other, Children  Current Services PTA:    Supplier: none  Patient able to perform ADL's:Assisted  DME used to aid ambulation prior to admission: home O2 2L from Pharmacy Counter, walker, canes, nebulizer, glucometer, CPAP/during admission - oxygen, bedside commode    Potential Assistance Needed:  1 Anabella Livia (has had [facility] in the past)    Pharmacy: [facility]   Potential Assistance Purchasing Medications:  No  Does patient want to participate in local refill/ meds to beds program?  N/A    Patient agreeable to home care: Yes  Freedom of choice provided:  yes      Type of Home Care Services:  None  Patient expects to be discharged to:  Home with home care    Prior SNF/Rehab Placement and Facility: [place] [address] to SNF/Rehab: Yes  [place] of choice provided: yes   Evaluation: yes    Expected Discharge date:  17  Follow Up Appointment: Best Day/ Time:      Transportation provider: ambulance  Transportation arrangements needed for
Discharge planning    Dr Perez completed peer to peer and informed that patient will be accepted at [facility]. Notified Macy Sanchez and once he has approval to accept will fax transportation request to [facility]. [facility] forms completed and will await on [facility] for the go ahead. [facility] called and asked for 1300 . Faxed forms to [facility].  Called and requested the 1300 and they states should be ok for 
Discharge planning    Patient denied per MMO for [facility] and peer to peer scheduled with Dr Perez at 0930. Dr Perez attempted to schedule one for today but was informed the first available was the one scheduled for tomorrow. Patient seen by pulmonary and due to patient bruising on face she is ordering a special mask for her home cpap to be picked up per her family at the pharmacy counter. Placed in [address] to do after [facility]    Noted social work entry \" LUIS received call from [place] with 1300 [facility] regarding pt. Tito reports she is pt RN CM with insurance, SW updated [place] on pt discharge plan.    Tito can be reached at [phone]\"     Call to Tito at the above number to update on plan and see if can facilitate peer to peer
SW received message from [address] [place] [address] regarding if PT/OT recommend SNF pt and family are agreeable to referral to [facility]. PT/OT recommend SNF SW faxed referral to [facility].
2-3x/week

## 2024-02-11 NOTE — PROGRESS NOTES
Occupational Therapy   Occupational Therapy Initial Assessment  Date: 3/3/2020   Patient Name: Love Contreras  MRN: [id number]     : 1945    RN Keyur Osuna reports patient is medically stable for therapy treatment this date. Chart reviewed prior to treatment and patient is agreeable for therapy. All lines intact and patient positioned comfortably at end of treatment. All patient needs addressed prior to ending therapy session. Date of Service: 3/3/2020    Discharge Recommendations:  Subacute/Skilled Nursing Facility  OT Equipment Recommendations  Equipment Needed: Yes  Mobility Devices: ADL Assistive Devices  ADL Assistive Devices: Long-handled Sponge;Reacher;Sock-Aid Soft;Long-handled Shoe Horn;Toileting - 3-in-1 Commode    Assessment   Performance deficits / Impairments: Decreased functional mobility ; Decreased ADL status; Decreased ROM; Decreased balance;Decreased strength;Decreased endurance;Decreased coordination;Decreased safe awareness;Decreased high-level IADLs;Decreased posture  Assessment: Skilled OT POC is recommended to address above deficits and maximize functional I and safety awareness to return home with assist as needed. Prognosis: Fair  Decision Making: Medium Complexity  OT Education: OT Role;Plan of Care;Transfer Training;Family Education  Patient Education: edema mgt tech, call light use/fall prevention, safety in function, EC/WS tech, pursed lip breathing, postural control   Barriers to Learning: memory deficits   REQUIRES OT FOLLOW UP: Yes  Activity Tolerance  Activity Tolerance: Patient limited by fatigue;Patient limited by pain(limited by resp status/O2 per NC )  Activity Tolerance: fair minus   Safety Devices  Safety Devices in place: Yes  Type of devices: Call light within reach; Chair alarm in place; Left in chair;Patient at risk for falls;Gait belt;Nurse notified; All fall risk precautions in place(BLE's elevated in recliner to reduce swelling.   Pt and spouse were edu on benefits of elevation and B ankle AROM/pumping as able to reduce edema. Both with good verbalization of understanding.  )           Patient Diagnosis(es): The encounter diagnosis was COPD exacerbation ([address] has a past medical history of Acute on chronic diastolic congestive heart failure ([address] Anesthesia complication, Asthma, Atrial fibrillation ([address] Cataracts, bilateral, Cellulitis, Cerebral artery occlusion with cerebral infarction ([address] CHF (congestive heart failure) ([address] Chronic kidney disease, Chronic kidney disease, Closed fracture of proximal end of right humerus with routine healing, Constipation, Controlled type 2 diabetes mellitus with stage 3 chronic kidney disease, without long-term current use of insulin ([address] COPD (chronic obstructive pulmonary disease) ([address] Difficult intravenous access, Difficulty walking, Diverticulosis, DM2 (diabetes mellitus, type 2) ([address] Full dentures, GERD (gastroesophageal reflux disease), H/O fall, Headache(784.0), [place] (hard of hearing), Hyperlipidemia, Hyperplastic polyp of intestine, Hypertension, Impaired ambulation, Kidney stone, Living in nursing home, Morbid obesity with BMI of 40.0-44.9, adult ([address] Muscle weakness, Nephrolithiasis, Open wound, ECTOR on CPAP, Osteoarthritis, Parkinson disease ([address] Restless leg syndrome, Spinal stenosis in cervical region, Type II or unspecified type diabetes mellitus without mention of complication, not stated as uncontrolled, Urethral caruncle, Wears glasses, and Wears glasses. has a past surgical history that includes Cervical disc surgery (2012); Appendectomy; Tonsillectomy and adenoidectomy (1953); hernia repair (1999); eye surgery (Bilateral, 2017); Cervical spine surgery (5/31/13); laminectomy (05/31/2013); Upper gastrointestinal endoscopy (12/28/2016); Colonoscopy (2009); Colonoscopy (12/29/2016); Colonoscopy (12/30/2016);  Colonoscopy (04/25/2017); pr colsc flx w/removal lesion by hot bx forceps (N/A, 4/25/2017); assistance  Toilet Transfers Comments: Mod verbal instruction/tactile assist for hand placement on grab bar, controlled stand to sit and awareness/assist with all lines to increase safety. ADL  Feeding: Setup  Grooming: Setup;Minimal assistance(seated )  UE Bathing: Setup  LE Bathing: Setup;Maximum assistance;Minimal assistance  UE Dressing: Setup; Moderate assistance(with hosp gown )  LE Dressing: Setup;Maximum assistance  Toileting: Maximum assistance  Tone RUE  RUE Tone: (difficulties assessing due to pain with PROM and movements to R shld )  Tone LUE  LUE Tone: Normotonic  Coordination  Movements Are Fluid And Coordinated: No  Coordination and Movement description: Fine motor impairments     Bed mobility  Supine to Sit: Unable to assess  Sit to Supine: Unable to assess  Comment: Pt up in chair upon arrival and pt requested to continue to sit up. Transfers  Stand Step Transfers: Contact guard assistance;Minimal assistance(with RW and fluctuates depending on surface )  Stand Pivot Transfers: Contact guard assistance;Minimal assistance(with RW )  Sit to stand: Contact guard assistance;Minimal assistance(fluctuates depending on surface )  Stand to sit: Contact guard assistance;Minimal assistance  Transfer Comments: Mod verbal instruction/tactile assist for hand placement, noes over toes as able, upright posture, RW safety, pursed lip breathing, controlled stand to sits and awareness/assist with all lines and IV pole to reduce fall risk and increase safety. Cognition  Overall Cognitive Status: Exceptions  Arousal/Alertness: Delayed responses to stimuli  Following Commands: Follows one step commands with increased time; Follows one step commands with repetition  Attention Span: Attends with cues to redirect  Memory: Decreased short term memory  Safety Judgement: Decreased awareness of need for assistance;Decreased awareness of need for safety  Problem Solving: Assistance required to implement solutions;Assistance required to generate solutions;Assistance required to correct errors made;Assistance required to identify errors made;Decreased awareness of errors  Insights: Decreased awareness of deficits  Initiation: Requires cues for some  Sequencing: Requires cues for some  Perception  Overall Perceptual Status: WFL     Sensation  Overall Sensation Status: Impaired(pt c/o B hand paresthesias )        LUE AROM (degrees)  LUE AROM : WFL  RUE AROM (degrees)  RUE AROM : Exceptions  RUE General AROM: R shld AROM to approx 60 degrees and PROM to 65 degrees with c/o pain; rest WFLS   LUE Strength  Gross LUE Strength: WFL  LUE Strength Comment: grossly 4/5   RUE Strength  Gross RUE Strength: Exceptions to [facility]  RUE Strength Comment: grossly 4/5 except R shld 3 minus/5                    Plan   Plan  Times per week: 4-5x/week 1x/day as wally   Current Treatment Recommendations: Strengthening, Functional Mobility Training, Safety Education & Training, Home Management Training, Balance Training, Pain Management, Self-Care / ADL, ROM, Endurance Training, Neuromuscular Re-education, Patient/Caregiver Education & Training, Equipment Evaluation, Education, & procurement                                                  AM-PAC Score   15          Goals  Short term goals  Time Frame for Short term goals: by discharge, pt to demo   Short term goal 1: bed mob tasks with use of rail as needed to SBA. Short term goal 2: increase in BUE strength by a 1/2 grade except R shld (increase AROM by 10-15 degrees as able) to assist with self care tasks. Short term goal 3: UB ADL to set up/min assist and LB ADL to min assist with use of AD/AE as needed. Short term goal 4: toileting tasks with use of grab bar/AD as needed to min assist.    Short term goal 5: ADL transfers and functional mob with AD as needed to SBA level.     Long term goals  Long term goal 1: Pt to stand with SUP with AD as needed wally > 4 mins as able to reduce fall Name band;

## 2024-08-04 NOTE — FLOWSHEET NOTE
Patient was sleeping, left note of visit and prayer for possible follow up,     08/27/18 1157   Encounter Summary   Services provided to: Patient   Referral/Consult From: Rounding   Continue Visiting (8-27-18 PT: sleeping)   Complexity of Encounter Low   Length of Encounter 15 minutes   Spiritual Assessment Completed Yes   Routine   Type Follow up   Lashonda Magallanes ,

## 2025-07-14 NOTE — PROGRESS NOTES
Pulmonary Critical Care Progress Note    Patient seen for the follow up of COPD exacerbation  Subjective:    She denies chest pain. She has cough with poor expectoration shortness of breath is slightly better. .  She is sitting in a chair. She has used her CPAP from home. She c is tolerating oral intake. Aneita Marva Examination:    Vitals: BP (!) 151/62   Pulse 73   Temp 98.2 °F (36.8 °C) (Oral)   Resp 18   Ht 4' 9\" (1.448 m)   Wt 173 lb 1.6 oz (78.5 kg)   LMP 2004 (Within Years)   SpO2 95%   BMI 37.46 kg/m²   SpO2  Av.7 %  Min: 95 %  Max: 100 %  General appearance: alert and cooperative with exam  Neck: No JVD  Lungs: Decreased breath sound no crackles improved bilateral wheezing  Heart: regular rate and rhythm, S1, S2 normal, no gallop  Abdomen: Soft, non tender, + BS  Extremities: no cyanosis or clubbing. No significant edema    LABs:    CBC:   Recent Labs     20  0459 20  0506   WBC 9.4 9.2   HGB 9.4* 9.4*   HCT 31.6* 31.7*   * 115*     BMP:   Recent Labs     20  0459 20  0506    137   K 4.2 4.4   CO2 30 31   BUN 46* 52*   CREATININE 1.65* 1.81*   LABGLOM 31* 27*   GLUCOSE 229* 209*   LIVER PROFILE:  No results for input(s): AST, ALT, LABALBU in the last 72 hours. Results for Mirtha Alvarez (MRN [id number]) as of 3/1/2020 12:54   Ref. Range 3/1/2020 05:46   Pro-BNP Latest Ref Range: <300 pg/mL 1,263 (H)   Results for Mirtha Alvarez (MRN [id number]) as of 3/1/2020 12:54   Ref.  Range 2020 17:13   Human Metapneumovirus PCR Latest Ref Range: Not Detected  Not Detected   Influenza A H1 PCR Latest Ref Range: Not Detected  NOT REPORTED   Adenovirus PCR Latest Ref Range: Not Detected  Not Detected   B Pertussis by PCR Latest Ref Range: Not Detected  Not Detected   Chlamydia pneumoniae By PCR Latest Ref Range: Not Detected  Not Detected   Coronavirus 229E PCR Latest Ref Range: Not Detected  Not Detected   Coronavirus HKU1 PCR Latest Ref Range: Not Detected  Not Detected To non clinical     Pt is due for a physical with PCP.   Please schedule  Last Visit: 7.1.24       Coronavirus NL63 PCR Latest Ref Range: Not Detected  Not Detected   Coronavirus OC Latest Ref Range: Not Detected  Not Detected   Influenza A by PCR Latest Ref Range: Not Detected  Not Detected   Influenza A H1 (2009) PCR Latest Ref Range: Not Detected  NOT REPORTED   Influenza A H3 PCR Latest Ref Range: Not Detected  NOT REPORTED   Influenza B by PCR Latest Ref Range: Not Detected  Not Detected   Parainfluenza 1 PCR Latest Ref Range: Not Detected  Not Detected   Parainfluenza 2 PCR Latest Ref Range: Not Detected  Not Detected   Parainfluenza 3 PCR Latest Ref Range: Not Detected  Not Detected   Parainfluenza 4 PCR Latest Ref Range: Not Detected  Not Detected   Resp Syncytial Virus PCR Latest Ref Range: Not Detected  DETECTED (A)   Rhino/Enterovirus PCR Latest Ref Range: Not Detected  Not Detected   Mycoplasma pneumo by PCR Latest Ref Range: Not Detected  Not Detected       Radiology:    Chest x-ray 3/1  Cardiomegaly with vascular congestion is unchanged from the prior study.  No   focal consolidation, pneumothorax, large pleural effusion or free air.  Once   again demonstrates chronic deformity right humeral head         Impression:  · Chronic hypoxic respiratory failure  · Acute exacerbation of COPD  · RSV tracheobronchitis  · Pulmonary edema/chronic diastolic heart failure  · Obstructive sleep apnea/Obesity, noncompliant on CPAP  · KRISTIN on CKD  · A. fib, DM, HLD, HTN,  GERD      Recommendations:  · Oxygen by nasal cannula  · Home CPAP for sleep  · Incentive spirometry every hour while awake   ·  DuoNeb by nebulizer every 4 hours while awake  · Pulmicort 0.5 Q 12hrs   · Mucomyst by nebulizer 20% 2 mL 3 times daily  ·  flutter device  · CXR   · Solu-Medrol 40 IV every 8 hours  · Zithromax Rocephin   · Mucinex   · On Lasix  · Nephrology on consult  · Acetylcholine antibody  · DVT prophylaxis      Lucero Kasper MD, [facility]  Pulmonary Critical Care and Sleep Medicine,  [facility]  Cell: [phone]  Office:

## (undated) DEVICE — 3000CC GUARDIAN II: Brand: GUARDIAN

## (undated) DEVICE — SHEET, ORTHO, SPLIT, STERILE: Brand: MEDLINE

## (undated) DEVICE — GAUZE,SPONGE,4"X4",16PLY,XRAY,STRL,LF: Brand: MEDLINE

## (undated) DEVICE — GOWN,SIRUS,NON REINFRCD,LARGE,SET IN SL: Brand: MEDLINE

## (undated) DEVICE — INTENDED FOR TISSUE SEPARATION, AND OTHER PROCEDURES THAT REQUIRE A SHARP SURGICAL BLADE TO PUNCTURE OR CUT.: Brand: BARD-PARKER ® CARBON RIB-BACK BLADES

## (undated) DEVICE — Z DUP USE 2522782 SOLUTION IRRIG 1000ML STRL H2O PLAS CONTAINER UROMATIC

## (undated) DEVICE — MEDI-VAC NON-CONDUCTIVE SUCTION TUBING: Brand: CARDINAL HEALTH

## (undated) DEVICE — STRIP,CLOSURE,WOUND,MEDI-STRIP,1/2X4: Brand: MEDLINE

## (undated) DEVICE — POUCH INSTR W6.75XL11.5IN FRST 2 PKT ADH FOR ORTH AND

## (undated) DEVICE — DRAPE C ARM UNIV W41XL74IN CLR PLAS XR VELC CLSR POLY STRP

## (undated) DEVICE — GLOVE SURG SZ 75 L12IN FNGR THK79MIL GRN LTX FREE

## (undated) DEVICE — BANDAGE COBAN 4 IN COMPR W4INXL5YD FOAM COHESIVE QUIK STK SELF ADH SFT

## (undated) DEVICE — TUBING, SUCTION, 1/4" X 12', STRAIGHT: Brand: MEDLINE

## (undated) DEVICE — HOOK LOCK LATEX FREE ELASTIC BANDAGE 4INX5YD

## (undated) DEVICE — BLOCK BITE 60FR RUBBER ADLT DENTAL

## (undated) DEVICE — TOWEL,OR,DSP,ST,BLUE,STD,4/PK,20PK/CS: Brand: MEDLINE

## (undated) DEVICE — SYRINGE MED 50ML LUERLOCK TIP

## (undated) DEVICE — ELECTRODE PT RET AD L9FT HI MOIST COND ADH HYDRGEL CORDED

## (undated) DEVICE — DRESSING TRNSPAR W5XL4.5IN FLM SHT SEMIPERMEABLE WIND

## (undated) DEVICE — PACK PROCEDURE SURG CYSTO SVMMC LF

## (undated) DEVICE — GLOVE ORANGE PI 8 1/2   MSG9085

## (undated) DEVICE — 9165 UNIVERSAL PATIENT PLATE: Brand: 3M™

## (undated) DEVICE — NO USE 18 MONTHS GOWN ISOL XL YEL LF

## (undated) DEVICE — GLOVE SURG SZ 65 L12IN FNGR THK87MIL WHT LTX FREE

## (undated) DEVICE — GOWN,AURORA,NONRNF,XL,30/CS: Brand: MEDLINE

## (undated) DEVICE — 20 ML SYRINGE LUER-LOCK TIP: Brand: MONOJECT

## (undated) DEVICE — CHECK-FLO HEMOSTASIS ASSEMBLY: Brand: CHECK-FLO

## (undated) DEVICE — 6 ML SYRINGE LUER-LOCK TIP: Brand: MONOJECT

## (undated) DEVICE — ADAPTER TBNG LUER STUB 15 GA INTMED

## (undated) DEVICE — Device

## (undated) DEVICE — PADDING,UNDERCAST,COTTON, 4"X4YD STERILE: Brand: MEDLINE

## (undated) DEVICE — 3M™ WARMING BLANKET, LOWER BODY, 10 PER CASE, 42568: Brand: BAIR HUGGER™

## (undated) DEVICE — DRAPE,U/ SHT,SPLIT,PLAS,STERIL: Brand: MEDLINE

## (undated) DEVICE — Device: Brand: ENDOGATOR HYBRID IRRIGATION TUBING

## (undated) DEVICE — FORCEPS BX L240CM WRK CHN 2.8MM STD CAP W/ NDL MIC MESH

## (undated) DEVICE — GLOVE SURG SZ 7 CRM LTX FREE POLYISOPRENE POLYMER BEAD ANTI

## (undated) DEVICE — FORCEPS BX L240CM JAW DIA2.2MM RAD JAW 4 HOT DISP

## (undated) DEVICE — STRAP,CATHETER,ELASTIC,HOOK&LOOP: Brand: MEDLINE

## (undated) DEVICE — RADIFOCUS GLIDEWIRE: Brand: GLIDEWIRE

## (undated) DEVICE — DRESSING,GAUZE,XEROFORM,CURAD,5"X9",ST: Brand: CURAD

## (undated) DEVICE — YANKAUER,FLEXIBLE HANDLE,REGLR CAPACITY: Brand: MEDLINE INDUSTRIES, INC.

## (undated) DEVICE — CATHETER URET 8FR L65CM PLAS OPN CONE TIP RADPQ DISP

## (undated) DEVICE — SOLUTION IV IRRIG 500ML 0.9% SODIUM CHL 2F7123

## (undated) DEVICE — SOLUTION IV IRRIG WATER 1000ML POUR BRL 2F7114

## (undated) DEVICE — 60 ML SYRINGE REGULAR TIP: Brand: MONOJECT

## (undated) DEVICE — SINGLE-USE DIGITAL FLEXIBLE URETEROSCOPE: Brand: LITHOVUE

## (undated) DEVICE — NO USE 18 MONTHS GOWN STD LG  1153D

## (undated) DEVICE — PAD,ABDOMINAL,5"X9",ST,LF,25/BX: Brand: MEDLINE INDUSTRIES, INC.

## (undated) DEVICE — CUP MED 1OZ CLR POLYPR FEED GRAD W/O LID

## (undated) DEVICE — GOWN,AURORA,NON-REINFORCED,2XL: Brand: MEDLINE

## (undated) DEVICE — DRESSING PETRO W3XL8IN OIL EMUL N ADH GZ KNIT IMPREG CELOS

## (undated) DEVICE — GLOVE SURG SZ 75 L12IN FNGR THK87MIL WHT LTX FREE

## (undated) DEVICE — BASIN EMESIS 500CC ROSE 250/CS 60/PLT: Brand: MEDEGEN MEDICAL PRODUCTS, LLC

## (undated) DEVICE — GLOVE SURG SZ 7 L12IN FNGR THK87MIL WHT LTX FREE

## (undated) DEVICE — SINGLE USE SUCTION VALVE MAJ-209: Brand: SINGLE USE SUCTION VALVE (STERILE)

## (undated) DEVICE — SYSTEM PMP VAC SYR 10CC CONT FLO SGL ACT 1 W VLV SAPS

## (undated) DEVICE — Device: Brand: DEFENDO VALVE AND CONNECTOR KIT

## (undated) DEVICE — SUTURE VIC + ABS BR UD X1 2-0 27IN VCP459H

## (undated) DEVICE — BNDG,ELSTC,MATRIX,STRL,4"X5YD,LF,HOOK&LP: Brand: MEDLINE

## (undated) DEVICE — GLOVE ORANGE PI 7 1/2   MSG9075

## (undated) DEVICE — POSITIONER HD W8XH4XL8.5IN RASPBERRY FOAM SLT

## (undated) DEVICE — GAUZE,SPONGE,FLUFF,6"X6.75",STRL,5/TRAY: Brand: MEDLINE

## (undated) DEVICE — 3M™ IOBAN™ 2 ANTIMICROBIAL INCISE DRAPE 6650EZ: Brand: IOBAN™ 2

## (undated) DEVICE — MEDICINE CUP, GRADUATED, STER: Brand: MEDLINE

## (undated) DEVICE — DUAL LUMEN URETERAL CATHETER

## (undated) DEVICE — SURGICAL PROCEDURE KIT BASIN MAJ SET UP

## (undated) DEVICE — HYBRID TUBING WITH CO2 CONNECTION FOR OLYMPUS 140/160/180/190 SERIES GI ENDOSCOPES WITH OLYMPUS AFU-100 , OLYMPUS OFP: Brand: ENDOGATOR HYBRID IRRIGATION TUBING

## (undated) DEVICE — DRAINBAG,ANTI-REFLUX TOWER,L/F,2000ML,LL: Brand: MEDLINE

## (undated) DEVICE — CONTAINER,SPECIMEN,OR STERILE,4OZ: Brand: MEDLINE

## (undated) DEVICE — STOCKINETTE,IMPERVIOUS,12X48,STERILE: Brand: MEDLINE

## (undated) DEVICE — SINGLE USE BIOPSY VALVE MAJ-210: Brand: SINGLE USE BIOPSY VALVE (STERILE)

## (undated) DEVICE — CHLORAPREP 26ML ORANGE

## (undated) DEVICE — BALLOON DILATATION CATHETER: Brand: UROMAX ULTRA

## (undated) DEVICE — BITEBLOCK 54FR W/ DENT RIM BLOX

## (undated) DEVICE — GLOVE ORANGE PI 8   MSG9080

## (undated) DEVICE — BAG SPECIMEN BIOHAZARD 6IN X 9IN

## (undated) DEVICE — SUTURE ETHLN SZ 3-0 L18IN NONABSORBABLE BLK PS-2 L19MM 3/8 1669H

## (undated) DEVICE — GLOVE SURG SZ 85 CRM LTX FREE POLYISOPRENE POLYMER BEAD ANTI

## (undated) DEVICE — DRAPE,REIN 53X77,STERILE: Brand: MEDLINE

## (undated) DEVICE — CATHETER,URETHRAL,VINYL,MALE,16",18 FR: Brand: MEDLINE

## (undated) DEVICE — ORTHO EXT PK